# Patient Record
Sex: FEMALE | Race: WHITE | Employment: UNEMPLOYED | URBAN - METROPOLITAN AREA
[De-identification: names, ages, dates, MRNs, and addresses within clinical notes are randomized per-mention and may not be internally consistent; named-entity substitution may affect disease eponyms.]

---

## 2017-01-09 DIAGNOSIS — M10.9 ACUTE GOUTY ARTHRITIS: Primary | ICD-10-CM

## 2017-01-09 NOTE — TELEPHONE ENCOUNTER
allopurinol (ZYLOPRIM) 100 MG tablet       Last Written Prescription Date: 9/12/16  Last Fill Quantity: 30, # refills: 2  Last Office Visit with G, P or [facility] prescribing provider:  12/29/16   Next 5 appointments (look out 90 days)     Jan 30, 2017  1:00 PM   Return Visit with Kd Morris   [facility] ([facility])    [address] [place] [address]   [phone]            Mar 24, 2017  2:30 PM   Return Visit with Joshua Watts MD   [facility] ([facility])    [facility]  [address]  [place] [address]   [phone]                   URIC ACID   Date Value Ref Range Status   11/30/2015 4.5 2.6 - 6.0 mg/dL Final   ]  CREATININE   Date Value Ref Range Status   08/23/2016 0.80 0.52 - 1.04 mg/dL Final   ]  WBC      5.5   12/16/2016  RBC     6.57   12/16/2016  HGB     18.1   12/16/2016  HCT     56.7   12/16/2016  No components found with this name: mct  MCV       86   12/16/2016  MCH     27.5   12/16/2016  MCHC     31.9   12/16/2016  RDW     20.9   12/16/2016  PLT      118   12/16/2016  PLT      175   1/24/2008  PLT      175   1/24/2008  AST       16   6/3/2016  ALT       31   6/3/2016

## 2017-01-11 RX ORDER — ALLOPURINOL 100 MG/1
100 TABLET ORAL DAILY
Qty: 90 TABLET | Refills: 2 | Status: SHIPPED | OUTPATIENT
Start: 2017-01-11 | End: 2017-10-09

## 2017-01-12 ENCOUNTER — RADIANT APPOINTMENT (OUTPATIENT)
Dept: GENERAL RADIOLOGY | Facility: CLINIC | Age: 52
End: 2017-01-12
Attending: ORTHOPAEDIC SURGERY
Payer: COMMERCIAL

## 2017-01-12 ENCOUNTER — OFFICE VISIT (OUTPATIENT)
Dept: ORTHOPEDICS | Facility: CLINIC | Age: 52
End: 2017-01-12
Payer: COMMERCIAL

## 2017-01-12 VITALS — HEIGHT: 58 IN | WEIGHT: 225.8 LBS | RESPIRATION RATE: 16 BRPM | BODY MASS INDEX: 47.4 KG/M2

## 2017-01-12 DIAGNOSIS — M25.811 SHOULDER IMPINGEMENT, RIGHT: ICD-10-CM

## 2017-01-12 DIAGNOSIS — G89.29 CHRONIC RIGHT SHOULDER PAIN: Primary | ICD-10-CM

## 2017-01-12 DIAGNOSIS — M25.511 CHRONIC RIGHT SHOULDER PAIN: Primary | ICD-10-CM

## 2017-01-12 DIAGNOSIS — G89.29 CHRONIC RIGHT SHOULDER PAIN: ICD-10-CM

## 2017-01-12 DIAGNOSIS — M25.511 CHRONIC RIGHT SHOULDER PAIN: ICD-10-CM

## 2017-01-12 DIAGNOSIS — M19.019 AC (ACROMIOCLAVICULAR) JOINT ARTHRITIS: ICD-10-CM

## 2017-01-12 PROCEDURE — 99244 OFF/OP CNSLTJ NEW/EST MOD 40: CPT | Performed by: ORTHOPAEDIC SURGERY

## 2017-01-12 PROCEDURE — 73030 X-RAY EXAM OF SHOULDER: CPT | Mod: RT

## 2017-01-12 ASSESSMENT — PAIN SCALES - GENERAL: PAINLEVEL: WORST PAIN (10)

## 2017-01-12 NOTE — MR AVS SNAPSHOT
After Visit Summary   1/12/2017    Bia Bill    MRN: [id number]           Patient Information     Date Of Birth          1965        Visit Information        Provider Department      1/12/2017 2:00 PM Patel Rice MD [facility] [place]        Today's Diagnoses     Chronic right shoulder pain    -  1       Care Instructions    Please remember to call and schedule a follow up appointment if one was recommended at your earliest convenience.  Orthopedics CLINIC HOURS TELEPHONE NUMBER   Dr. Dwayne Desai  Certified Medical Assistant   Monday & Wednesday   8am - 5pm  Thursday 1pm - 5pm  Friday 8am -11:30am Specialty schedulers:   [phone] to schedule your surgery.  Main Clinic:   [phone] to make an appointment with any provider.    Urgent Care locations:    [facility] Monday-Friday Closed  Saturday-Sunday 9am-5pm      Monday-Friday 12pm - 8pm  Saturday-Sunday 9am-5pm [phone]     If SURGERY has been recommended, please call our Specialty Schedulers at [phone] to schedule your procedure.    If you need a medication refill, please contact your pharmacy. Please allow 3 business days for your refill to be completed.    If an MRI or CT scan has been recommended, please call [place] [facility] Schedulers at [phone] to schedule your appointment.  Use Classkickt (secure e-mail communication and access to your chart) to send a message or to make an appointment. Please ask how you can sign up for [employer].  Your care team's suggested websites for health information:   [email or web address] : Up to date and easily searchable information on multiple topics.   [email or web address] : [place] dept of heat, public health issues in [place], N1N1            Follow-ups after your visit        Your next 10 appointments already scheduled     Jan 12, 2017  2:00 PM   New Visit with MD Ton Borregoview  [facility] [place] ([facility] [place])    [address]  [place] [address] [phone]            Jan 20, 2017  1:40 PM   LAB with [facility] ([facility])    [address] [address]   [phone]           Patient must bring picture ID.  Patient should be prepared to give a urine specimen  Please do not eat 10-12 hours before your appointment if you are coming in fasting for labs on lipids, cholesterol, or glucose (sugar).  Pregnant women should follow their Care Team instructions. Water with medications is okay. Do not drink coffee or other fluids.   If you have concerns about taking  your medications, please ask at office or if scheduling via Arts[employer], send a message by clicking on Secure Messaging, Message Your Care Team.            Jan 30, 2017  1:00 PM   Return Visit with Kd Morris   [facility] ([facility])    [address] [address]   [phone]            Feb 24, 2017  1:40 PM   LAB with [facility])    [address] [address]   [phone]           Patient must bring picture ID.  Patient should be prepared to give a urine specimen  Please do not eat 10-12 hours before your appointment if you are coming in fasting for labs on lipids, cholesterol, or glucose (sugar).  Pregnant women should follow their Care Team instructions. Water with medications is okay. Do not drink coffee or other fluids.   If you have concerns about taking  your medications, please ask at office or if scheduling via [employer], send a message by clicking on Secure Messaging, Message Your Care Team.            Mar 24, 2017  2:00 PM   LAB with [facility] ([facility])    [address] [address]   [phone]           Patient must  "bring picture ID.  Patient should be prepared to give a urine specimen  Please do not eat 10-12 hours before your appointment if you are coming in fasting for labs on lipids, cholesterol, or glucose (sugar).  Pregnant women should follow their Care Team instructions. Water with medications is okay. Do not drink coffee or other fluids.   If you have concerns about taking  your medications, please ask at office or if scheduling via [employer], send a message by clicking on Secure Messaging, Message Your Care Team.            Mar 24, 2017  2:30 PM   Return Visit with Joshua Watts MD   [facility] ([facility])    [facility]  [address]  [place] [address] [phone]              Future tests that were ordered for you today     Open Future Orders        Priority Expected Expires Ordered    XR Shoulder Right G/E 3 Views Routine 1/12/2017 1/12/2018 1/12/2017            Who to contact     If you have questions or need follow up information about today's clinic visit or your schedule please contact [facility] directly at [phone].  Normal or non-critical lab and imaging results will be communicated to you by MyChart, letter or phone within 4 business days after the clinic has received the results. If you do not hear from us within 7 days, please contact the clinic through Imimte[employer] or phone. If you have a critical or abnormal lab result, we will notify you by phone as soon as possible.  Submit refill requests through [employer] or call your pharmacy and they will forward the refill request to us. Please allow 3 business days for your refill to be completed.          Additional Information About Your Visit        [employer] Information     [employer] lets you send messages to your doctor, view your test results, renew your prescriptions, schedule appointments and more. To sign up, go to [email or web address] . Click on \"Log in\" " "on the left side of the screen, which will take you to the Welcome page. Then click on \"Sign up Now\" on the right side of the page.     You will be asked to enter the access code listed below, as well as some personal information. Please follow the directions to create your username and password.     Your access code is: [email or web address]  Expires: 3/29/2017  2:13 PM     Your access code will  in 90 days. If you need help or a new code, please call your [place] clinic or [phone].        Care EveryWhere ID     This is your Care EveryWhere ID. This could be used by other organizations to access your [place] medical records  [id number]        Your Vitals Were     Respirations Height BMI (Body Mass Index) Last Period          16 1.473 m (4' 10\") 47.20 kg/m2 2009         Blood Pressure from Last 3 Encounters:   16 124/62   11/15/16 118/60   16 119/57    Weight from Last 3 Encounters:   17 102.422 kg (225 lb 12.8 oz)   16 100.789 kg (222 lb 3.2 oz)   11/15/16 102.967 kg (227 lb)               Primary Care Provider Office Phone # Fax #    Kd Leong [phone]       [facility] [place] [facility] [address] [address]        Thank you!     Thank you for choosing [facility]  for your care. Our goal is always to provide you with excellent care. Hearing back from our patients is one way we can continue to improve our services. Please take a few minutes to complete the written survey that you may receive in the mail after your visit with us. Thank you!             Your Updated Medication List - Protect others around you: Learn how to safely use, store and throw away your medicines at www.disposemymeds.org.          This list is accurate as of: 17  1:51 PM.  Always use your most recent med list.                   Brand Name Dispense Instructions for use    acetaminophen 500 MG tablet    TYLENOL     Take 500-1,000 mg by mouth " every 6 hours as needed for mild pain       * albuterol (2.5 MG/3ML) 0.083% neb solution     1 Box    Take 1 vial (2.5 mg) by nebulization every 6 hours as needed for shortness of breath / dyspnea or wheezing       * albuterol 108 (90 BASE) MCG/ACT Inhaler    PROAIR HFA/PROVENTIL HFA/VENTOLIN HFA    3 Inhaler    Inhale 2 puffs into the lungs every 6 hours as needed for shortness of breath / dyspnea or wheezing       allopurinol 100 MG tablet    ZYLOPRIM    90 tablet    Take 1 tablet (100 mg) by mouth daily       ARIPiprazole 5 MG tablet    ABILIFY    45 tablet    Take 1.5 tablets (7.5 mg) by mouth At Bedtime       * ASPIRIN NOT PRESCRIBED    INTENTIONAL     by Other route continuous prn.       buPROPion 150 MG 24 hr tablet    WELLBUTRIN XL    90 tablet    Take 1 tablet (150 mg) by mouth every morning       cholecalciferol 1000 UNIT tablet    vitamin D     Take 1 tablet by mouth daily       EPINEPHrine 0.3 MG/0.3ML injection     0.6 mL    Inject 0.3 mLs (0.3 mg) into the muscle once as needed for anaphylaxis       furosemide 20 MG tablet    LASIX    180 tablet    Take 1 tablet (20 mg) by mouth 2 times daily       gabapentin 600 MG tablet    NEURONTIN    135 tablet    Take 1.5 tablets (900 mg) by mouth 3 times daily       ipratropium - albuterol 0.5 mg/2.5 mg/3 mL 0.5-2.5 (3) MG/3ML neb solution    DUONEB    30 vial    Take 1 vial (3 mLs) by nebulization every 6 hours as needed for shortness of breath / dyspnea or wheezing USE THIS INSTEAD OF ALBUTEROL INHALER AS NEEDED FOR WHEEZE COUGH OR SHORTNESS OF BREATH       lamoTRIgine 200 MG tablet    LaMICtal     Take 1 tablet by mouth daily       levothyroxine 150 MCG tablet    SYNTHROID/LEVOTHROID    30 tablet    Take 1 tablet (150 mcg) by mouth daily       mometasone-formoterol 100-5 MCG/ACT oral inhaler    DULERA    13 g    Inhale 2 puffs into the lungs 2 times daily       omeprazole 20 MG CR capsule    priLOSEC    180 capsule    Take 1 capsule (20 mg) by mouth 2 times  "daily       * order for DME     1 Units    Equipment being ordered: CPAP       * order for DME     3 Month    Equipment being ordered: CPAP supplies needed       order for DME     1 Month    Equipment being ordered: INCONTINENCE PADS  QID       * order for DME     60 Month    Equipment being ordered: DEPENDS SIZE LARGE       * order for DME     2 Device    Equipment being ordered:   \"[id number]\" x2pair  \"20-30mmHg [place] Hybrid Liners\" x1pair       * order for DME     2 Units    Equipment being ordered: [id number]\" x2pair   \"20-30mmHg [place] Hybrid Liners\" x 2 pair       prazosin 2 MG capsule    MINIPRESS         simvastatin 20 MG tablet    ZOCOR    90 tablet    Take 1 tablet (20 mg) by mouth At Bedtime (Needs fasting lab work)       traZODone 150 MG tablet    DESYREL     Take 1 tablet by mouth At Bedtime       warfarin 5 MG tablet    COUMADIN    65 tablet    Take 15mg by mouth Mondays and 10mg all other days or as directed by Anticoagulation Clinic       * Notice:  This list has 8 medication(s) that are the same as other medications prescribed for you. Read the directions carefully, and ask your doctor or other care provider to review them with you.      "

## 2017-01-12 NOTE — NURSING NOTE
"Chief Complaint   Patient presents with     Shoulder Pain     Right shoulder pain. Onset: 1/2015-2/2015. Patient states she fell on her left shoulder after she was getting up from a chair. She was dizzy and fell down. She has been seen at [facility] - Dr. Norris iMles. Pain is through the entire shoulder. She has had injections and they didn't help. She has tried PT but that made it worse. Pain with movement. She has a lot of grinding in her shoulder.        Initial Resp 16  Ht 1.473 m (4' 10\")  Wt 102.422 kg (225 lb 12.8 oz)  BMI 47.20 kg/m2  LMP 09/27/2009 Estimated body mass index is 47.2 kg/(m^2) as calculated from the following:    Height as of this encounter: 1.473 m (4' 10\").    Weight as of this encounter: 102.422 kg (225 lb 12.8 oz).  BP completed using cuff size: NA (Not Taken)  Giselle Vega Certified Medical Assistant    "
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
## 2017-01-12 NOTE — PATIENT INSTRUCTIONS
Please remember to call and schedule a follow up appointment if one was recommended at your earliest convenience.  Orthopedics CLINIC HOURS TELEPHONE NUMBER   Dr. Dwayne Desai  Certified Medical Assistant   Monday & Wednesday   8am - 5pm  Thursday 1pm - 5pm  Friday 8am -11:30am Specialty schedulers:   [phone] to schedule your surgery.  Main Clinic:   [phone] to make an appointment with any provider.    Urgent Care locations:    [facility] Monday-Friday Closed  Saturday-Sunday 9am-5pm      Monday-Friday 12pm - 8pm  Saturday-Sunday 9am-5pm [phone]     If SURGERY has been recommended, please call our Specialty Schedulers at [phone] to schedule your procedure.    If you need a medication refill, please contact your pharmacy. Please allow 3 business days for your refill to be completed.    If an MRI or CT scan has been recommended, please call [facility] Schedulers at [phone] to schedule your appointment.  Use [facility] [employer] (secure e-mail communication and access to your chart) to send a message or to make an appointment. Please ask how you can sign up for [facility].  Your care team's suggested websites for health information:   [email or web address] : Up to date and easily searchable information on multiple topics.   [email or web address] : [place] dept of heat, public health issues in [place], N1N1

## 2017-01-13 ENCOUNTER — TELEPHONE (OUTPATIENT)
Dept: PSYCHIATRY | Facility: CLINIC | Age: 52
End: 2017-01-13

## 2017-01-13 ENCOUNTER — TELEPHONE (OUTPATIENT)
Dept: ORTHOPEDICS | Facility: CLINIC | Age: 52
End: 2017-01-13

## 2017-01-13 DIAGNOSIS — F33.1 MAJOR DEPRESSIVE DISORDER, RECURRENT EPISODE, MODERATE (H): Primary | ICD-10-CM

## 2017-01-13 RX ORDER — ARIPIPRAZOLE 5 MG/1
7.5 TABLET ORAL AT BEDTIME
Qty: 45 TABLET | Refills: 0 | Status: SHIPPED | OUTPATIENT
Start: 2017-01-13 | End: 2017-01-18

## 2017-01-13 NOTE — TELEPHONE ENCOUNTER
Refilled Abilify per RN protocol. Called pt to inform her.        Last Written Prescription Date: 11/15/16  Last Fill Quantity: 45, # refills: 1  Last Office Visit with [facility], [facility] or [facility] prescribing provider: 11/15/16   Next 5 appointments (look out 90 days)     Jan 18, 2017 12:15 PM   [facility]    [address] [address]   [phone]            Jan 30, 2017  1:00 PM   Return Visit with Kd Morris   [facility] ([facility]    [address] [address]   [phone]            Feb 01, 2017  2:20 PM   Pre-Op physical with Kd Leong MD   [facility]    [address] [address]   [phone]            Feb 20, 2017 11:00 AM   Return Visit with Patel Rice MD   [facility] [place] ([facility] [place])    [address]  [place] [address]   [phone]            Mar 24, 2017  2:30 PM   Return Visit with Joshua Watts MD   [facility] ([facility])    [facility]  [address]  [place] [address]   [phone]                   BP Readings from Last 3 Encounters:   12/29/16 124/62   11/15/16 118/60   09/22/16 119/57     Pulse Readings from Last 2 Encounters:   12/29/16 76   11/15/16 68     GLC       93   8/23/2016  WBC      5.5   12/16/2016  RBC     6.57   12/16/2016  HGB     18.1   12/16/2016  HCT     56.7   12/16/2016  No components found with this name: mct  MCV       86   12/16/2016  MCH     27.5   12/16/2016  MCHC     31.9   12/16/2016  RDW     20.9   12/16/2016  PLT      118   12/16/2016  PLT      175   1/24/2008  PLT      175   1/24/2008  CHOL      138   10/26/2016  HDL       34   10/26/2016  LDL       78   10/26/2016  TRIG      129    10/26/2016  CHOLHDLRATIO      5.0   9/10/2015  From treatment plan from Lisa Bai RN, MS, APRN 11/18/16  Treatment Plan:  Restart Take one tab for two weeks, then two tabs for two weeks, then four tabs daily. After two weeks of 100 mg, then increase to 150 mg for one week, then increase to 200 mg daily.   Restart aripiprazole (Abilify) 7.5 mg daily.    Continue bupropion (Wellbutrin)  mg daily and trazodone (Desyrel) 75 mg at bedtime.    Follow up with primary care provider in 1-2 months.     - Safety plan was reviewed; to the ER as needed or call after hours crisis line; [phone]  - Education and counseling was done regarding use of medications, psychotherapy options  - Call [phone] for appointment or to speak to a nurse.    -Office hours: Monday through Thursday 8:00 am to 4:30 pm; Friday 8:00 am to Noon.             Signed:  Lisa Bai, RN, MS, CNS-BC

## 2017-01-16 DIAGNOSIS — M10.9 ACUTE GOUTY ARTHRITIS: Primary | ICD-10-CM

## 2017-01-18 ENCOUNTER — OFFICE VISIT (OUTPATIENT)
Dept: PSYCHIATRY | Facility: CLINIC | Age: 52
End: 2017-01-18
Payer: COMMERCIAL

## 2017-01-18 ENCOUNTER — TELEPHONE (OUTPATIENT)
Dept: ANTICOAGULATION | Facility: CLINIC | Age: 52
End: 2017-01-18

## 2017-01-18 VITALS — HEART RATE: 111 BPM | DIASTOLIC BLOOD PRESSURE: 58 MMHG | SYSTOLIC BLOOD PRESSURE: 133 MMHG

## 2017-01-18 DIAGNOSIS — F33.1 MAJOR DEPRESSIVE DISORDER, RECURRENT EPISODE, MODERATE (H): Primary | ICD-10-CM

## 2017-01-18 PROCEDURE — 99214 OFFICE O/P EST MOD 30 MIN: CPT | Performed by: CLINICAL NURSE SPECIALIST

## 2017-01-18 RX ORDER — TRAZODONE HYDROCHLORIDE 150 MG/1
150 TABLET ORAL AT BEDTIME
Qty: 90 TABLET | Refills: 1 | Status: SHIPPED | OUTPATIENT
Start: 2017-01-18 | End: 2017-07-21

## 2017-01-18 RX ORDER — ARIPIPRAZOLE 5 MG/1
7.5 TABLET ORAL AT BEDTIME
Qty: 45 TABLET | Refills: 0 | Status: SHIPPED | OUTPATIENT
Start: 2017-01-18 | End: 2017-02-14

## 2017-01-18 RX ORDER — LAMOTRIGINE 200 MG/1
200 TABLET ORAL DAILY
Qty: 60 TABLET | Refills: 1 | Status: SHIPPED | OUTPATIENT
Start: 2017-01-18 | End: 2017-03-29

## 2017-01-18 ASSESSMENT — ANXIETY QUESTIONNAIRES
7. FEELING AFRAID AS IF SOMETHING AWFUL MIGHT HAPPEN: NOT AT ALL
2. NOT BEING ABLE TO STOP OR CONTROL WORRYING: SEVERAL DAYS
6. BECOMING EASILY ANNOYED OR IRRITABLE: NEARLY EVERY DAY
3. WORRYING TOO MUCH ABOUT DIFFERENT THINGS: SEVERAL DAYS
1. FEELING NERVOUS, ANXIOUS, OR ON EDGE: MORE THAN HALF THE DAYS
5. BEING SO RESTLESS THAT IT IS HARD TO SIT STILL: MORE THAN HALF THE DAYS
GAD7 TOTAL SCORE: 10

## 2017-01-18 ASSESSMENT — PATIENT HEALTH QUESTIONNAIRE - PHQ9: 5. POOR APPETITE OR OVEREATING: SEVERAL DAYS

## 2017-01-18 NOTE — MR AVS SNAPSHOT
After Visit Summary   1/18/2017    Bia Bill    MRN: [id number]           Patient Information     Date Of Birth          1965        Visit Information        Provider Department      1/18/2017 12:15 PM Lisa Bai APRN [facility]        Today's Diagnoses     Major depressive disorder, recurrent episode, moderate (H)    -  1       Care Instructions    Treatment Plan:  Continue lamotrigine (Lamictal) 200 mg daily, aripiprazole (Abilify) 7.5 mg daily, bupropion (Wellbutrin)  mg daily and trazodone (Desyrel) 150 mg at bedtime.   Continue individual therapy.   Follow up with primary care provider in 1-2 months.     - Safety plan was reviewed; to the ER as needed or call after hours crisis line; [phone]  - Education and counseling was done regarding use of medications, psychotherapy options        Follow-ups after your visit        Your next 10 appointments already scheduled     Jan 20, 2017  1:40 PM   LAB with [facility] ([facility])    [address] [address]   [phone]           Patient must bring picture ID.  Patient should be prepared to give a urine specimen  Please do not eat 10-12 hours before your appointment if you are coming in fasting for labs on lipids, cholesterol, or glucose (sugar).  Pregnant women should follow their Care Team instructions. Water with medications is okay. Do not drink coffee or other fluids.   If you have concerns about taking  your medications, please ask at office or if scheduling via [employer], send a message by clicking on Secure Messaging, Message Your Care Team.            Jan 30, 2017  1:00 PM   Return Visit with Kd Morris   [facility] ([facility])    [address] [address]   [phone]            Feb 01, 2017  2:20 PM   Pre-Op physical with Kd Leong MD   [facility]  ([facility])    [address] [address]   [phone]            Feb 20, 2017 11:00 AM   Return Visit with Patel Rice MD   [facility] [place] ([facility] [place])    [address]  [place] [address]   [phone]            Feb 24, 2017  1:40 PM   LAB with [facility])    [address] [address] [phone]           Patient must bring picture ID.  Patient should be prepared to give a urine specimen  Please do not eat 10-12 hours before your appointment if you are coming in fasting for labs on lipids, cholesterol, or glucose (sugar).  Pregnant women should follow their Care Team instructions. Water with medications is okay. Do not drink coffee or other fluids.   If you have concerns about taking  your medications, please ask at office or if scheduling via [email or web address], send a message by clicking on Secure Messaging, Message Your Care Team.            Mar 24, 2017  2:00 PM   LAB with [facility] ([facility])    [address] [id number] [phone]           Patient must bring picture ID.  Patient should be prepared to give a urine specimen  Please do not eat 10-12 hours before your appointment if you are coming in fasting for labs on lipids, cholesterol, or glucose (sugar).  Pregnant women should follow their Care Team instructions. Water with medications is okay. Do not drink coffee or other fluids.   If you have concerns about taking  your medications, please ask at office or if scheduling via [email or web address], send a message by clicking on Secure Messaging, Message Your Care Team.            Mar 24, 2017  2:30 PM   Return Visit with Joshua Watts MD   [facility] ([facility])    [facility]  [address]  [facility] [address]  "  [phone]              Who to contact     If you have questions or need follow up information about today's clinic visit or your schedule please contact [facility] directly at [phone].  Normal or non-critical lab and imaging results will be communicated to you by MyChart, letter or phone within 4 business days after the clinic has received the results. If you do not hear from us within 7 days, please contact the clinic through MyChart or phone. If you have a critical or abnormal lab result, we will notify you by phone as soon as possible.  Submit refill requests through [employer] or call your pharmacy and they will forward the refill request to us. Please allow 3 business days for your refill to be completed.          Additional Information About Your Visit        [facility] Information     [employer] lets you send messages to your doctor, view your test results, renew your prescriptions, schedule appointments and more. To sign up, go to [email or web address] . Click on \"Log in\" on the left side of the screen, which will take you to the Welcome page. Then click on \"Sign up Now\" on the right side of the page.     You will be asked to enter the access code listed below, as well as some personal information. Please follow the directions to create your username and password.     Your access code is: [email or web address]  Expires: 3/29/2017  2:13 PM     Your access code will  in 90 days. If you need help or a new code, please call your [place] clinic or [phone].        Care EveryWhere ID     This is your Care EveryWhere ID. This could be used by other organizations to access your [place] medical records  [id number]        Your Vitals Were     Pulse Last Period Breastfeeding?             111 2009 No          Blood Pressure from Last 3 Encounters:   17 133/58   16 124/62   11/15/16 118/60    Weight from Last 3 Encounters:   17 225 lb 12.8 oz (102.422 kg)   16 222 lb " 3.2 oz (100.789 kg)   11/15/16 227 lb (102.967 kg)              Today, you had the following     No orders found for display         Today's Medication Changes          These changes are accurate as of: 1/18/17 12:42 PM.  If you have any questions, ask your nurse or doctor.               Stop taking these medicines if you haven't already. Please contact your care team if you have questions.     prazosin 2 MG capsule   Commonly known as:  MINIPRESS   Stopped by:  Lisa Bai APRN CNS                Where to get your medicines      These medications were sent to [id number] - [place], [place] - [address]  [address] [address]    Hours:  Closed 10-16-08 business to [facility] Phone:  [phone]    - ARIPiprazole 5 MG tablet  - lamoTRIgine 200 MG tablet  - traZODone 150 MG tablet             Primary Care Provider Office Phone # Fax #    Kd Leong [phone]       [facility] [address] [address]        Thank you!     Thank you for choosing [facility]  for your care. Our goal is always to provide you with excellent care. Hearing back from our patients is one way we can continue to improve our services. Please take a few minutes to complete the written survey that you may receive in the mail after your visit with us. Thank you!             Your Updated Medication List - Protect others around you: Learn how to safely use, store and throw away your medicines at www.disposemymeds.org.          This list is accurate as of: 1/18/17 12:42 PM.  Always use your most recent med list.                   Brand Name Dispense Instructions for use    acetaminophen 500 MG tablet    TYLENOL     Take 500-1,000 mg by mouth every 6 hours as needed for mild pain       * albuterol (2.5 MG/3ML) 0.083% neb solution     1 Box    Take 1 vial (2.5 mg) by nebulization every 6 hours as needed for shortness of breath / dyspnea or  wheezing       * albuterol 108 (90 BASE) MCG/ACT Inhaler    PROAIR HFA/PROVENTIL HFA/VENTOLIN HFA    3 Inhaler    Inhale 2 puffs into the lungs every 6 hours as needed for shortness of breath / dyspnea or wheezing       allopurinol 100 MG tablet    ZYLOPRIM    90 tablet    Take 1 tablet (100 mg) by mouth daily       ARIPiprazole 5 MG tablet    ABILIFY    45 tablet    Take 1.5 tablets (7.5 mg) by mouth At Bedtime       * ASPIRIN NOT PRESCRIBED    INTENTIONAL     by Other route continuous prn.       buPROPion 150 MG 24 hr tablet    WELLBUTRIN XL    90 tablet    Take 1 tablet (150 mg) by mouth every morning       cholecalciferol 1000 UNIT tablet    vitamin D     Take 1 tablet by mouth daily       EPINEPHrine 0.3 MG/0.3ML injection     0.6 mL    Inject 0.3 mLs (0.3 mg) into the muscle once as needed for anaphylaxis       furosemide 20 MG tablet    LASIX    180 tablet    Take 1 tablet (20 mg) by mouth 2 times daily       gabapentin 600 MG tablet    NEURONTIN    135 tablet    Take 1.5 tablets (900 mg) by mouth 3 times daily       ipratropium - albuterol 0.5 mg/2.5 mg/3 mL 0.5-2.5 (3) MG/3ML neb solution    DUONEB    30 vial    Take 1 vial (3 mLs) by nebulization every 6 hours as needed for shortness of breath / dyspnea or wheezing USE THIS INSTEAD OF ALBUTEROL INHALER AS NEEDED FOR WHEEZE COUGH OR SHORTNESS OF BREATH       lamoTRIgine 200 MG tablet    LaMICtal    60 tablet    Take 1 tablet (200 mg) by mouth daily       levothyroxine 150 MCG tablet    SYNTHROID/LEVOTHROID    30 tablet    Take 1 tablet (150 mcg) by mouth daily       mometasone-formoterol 100-5 MCG/ACT oral inhaler    DULERA    13 g    Inhale 2 puffs into the lungs 2 times daily       omeprazole 20 MG CR capsule    priLOSEC    180 capsule    Take 1 capsule (20 mg) by mouth 2 times daily       * order for DME     1 Units    Equipment being ordered: CPAP       * order for DME     3 Month    Equipment being ordered: CPAP supplies needed       order for DME      "1 Month    Equipment being ordered: INCONTINENCE PADS  QID       * order for DME     60 Month    Equipment being ordered: DEPENDS SIZE LARGE       * order for DME     2 Device    Equipment being ordered:   \"[id number]\" x2pair  \"20-30mmHg [place] Hybrid Liners\" x1pair       * order for DME     2 Units    Equipment being ordered: [id number]\" x2pair   \"20-30mmHg [place] Hybrid Liners\" x 2 pair       simvastatin 20 MG tablet    ZOCOR    90 tablet    Take 1 tablet (20 mg) by mouth At Bedtime (Needs fasting lab work)       traZODone 150 MG tablet    DESYREL    90 tablet    Take 1 tablet (150 mg) by mouth At Bedtime       warfarin 5 MG tablet    COUMADIN    65 tablet    Take 15mg by mouth Mondays and 10mg all other days or as directed by Anticoagulation Clinic       * Notice:  This list has 8 medication(s) that are the same as other medications prescribed for you. Read the directions carefully, and ask your doctor or other care provider to review them with you.      "

## 2017-01-18 NOTE — PROGRESS NOTES
"      Psychiatric Progress Note    Name: Bia Bill  Date: 1/18/2017  Length of Visit: 30 minutes  MRN: [id number]      Current Outpatient Prescriptions   Medication Sig     ARIPiprazole (ABILIFY) 5 MG tablet Take 1.5 tablets (7.5 mg) by mouth At Bedtime     allopurinol (ZYLOPRIM) 100 MG tablet Take 1 tablet (100 mg) by mouth daily     VITAMIN D3 1000 UNITS tablet Take 1 tablet by mouth daily     traZODone (DESYREL) 150 MG tablet Take 1 tablet by mouth At Bedtime     lamoTRIgine (LAMICTAL) 200 MG tablet Take 1 tablet by mouth daily     gabapentin (NEURONTIN) 600 MG tablet Take 1.5 tablets (900 mg) by mouth 3 times daily     warfarin (COUMADIN) 5 MG tablet Take 15mg by mouth Mondays and 10mg all other days or as directed by Anticoagulation Clinic     furosemide (LASIX) 20 MG tablet Take 1 tablet (20 mg) by mouth 2 times daily     buPROPion (WELLBUTRIN XL) 150 MG 24 hr tablet Take 1 tablet (150 mg) by mouth every morning     simvastatin (ZOCOR) 20 MG tablet Take 1 tablet (20 mg) by mouth At Bedtime (Needs fasting lab work)     prazosin (MINIPRESS) 2 MG capsule      mometasone-formoterol (DULERA) 100-5 MCG/ACT oral inhaler Inhale 2 puffs into the lungs 2 times daily     order for DME Equipment being ordered: [id number]\" x2pair     \"20-30mmHg [facility] Hybrid Liners\" x 2 pair     EPINEPHrine (EPIPEN) 0.3 MG/0.3ML injection Inject 0.3 mLs (0.3 mg) into the muscle once as needed for anaphylaxis     ipratropium - albuterol 0.5 mg/2.5 mg/3 mL (DUONEB) 0.5-2.5 (3) MG/3ML nebulization Take 1 vial (3 mLs) by nebulization every 6 hours as needed for shortness of breath / dyspnea or wheezing USE THIS INSTEAD OF ALBUTEROL INHALER AS NEEDED FOR WHEEZE COUGH OR SHORTNESS OF BREATH     omeprazole (PRILOSEC) 20 MG capsule Take 1 capsule (20 mg) by mouth 2 times daily     levothyroxine (SYNTHROID, LEVOTHROID) 150 MCG tablet Take 1 tablet (150 mcg) by mouth daily     albuterol (PROAIR HFA, PROVENTIL HFA, VENTOLIN HFA) 108 (90 " "BASE) MCG/ACT inhaler Inhale 2 puffs into the lungs every 6 hours as needed for shortness of breath / dyspnea or wheezing     order for DME Equipment being ordered: DEPENDS SIZE LARGE     order for DME Equipment being ordered:     \"[id number]\" x2pair    \"20-30mmHg [place] Hybrid Liners\" x1pair     order for DME Equipment being ordered: INCONTINENCE PADS   QID     order for DME Equipment being ordered: CPAP supplies needed     albuterol (2.5 MG/3ML) 0.083% nebulizer solution Take 1 vial (2.5 mg) by nebulization every 6 hours as needed for shortness of breath / dyspnea or wheezing     acetaminophen (TYLENOL) 500 MG tablet Take 500-1,000 mg by mouth every 6 hours as needed for mild pain     ORDER FOR DME Equipment being ordered: CPAP     ASPIRIN NOT PRESCRIBED, INTENTIONAL, by Other route continuous prn.     No current facility-administered medications for this visit.       Therapist:  Kd Morris LP    PHQ-9:  PHQ-9 score:  9  PHQ-9 SCORE 12/29/2016   Total Score -   Total Score 11       HCIN-7: 10  CHIN-7 SCORE 9/2/2016 11/15/2016 12/29/2016   Total Score - - -   Total Score 11 13 9           Interim History:  Patient returns for follow up from appointment 11-. Patient had run out of medications prior to the last appointment and restarted lamotrigine (Lamictal) 200 mg daily, aripiprazole (Abilify) 7.5 mg daily, bupropion (Wellbutrin)  mg daily, and trazodone (Desyrel) 75 mg at bedtime. Today, patient reports she is doing well. Patient reports she will be having shoulder surgery and is anxious about that. We discussed this is a normal response to this stressor and some anxiety is expected. Patient has been getting information from family and friends that has increased the anxiety. Patient reports relieving stress by doing handiwork.       Past Medical History   Diagnosis Date     Anxiety state, unspecified      Depressive disorder, not elsewhere classified      Bipolar I disorder, most recent episode " "(or current) unspecified      Polycythemia, secondary      [place] HGB      DVT, lower extremity (H)        10 point ROS is negative except for those listed above.     Vital Signs:   /58 mmHg  Pulse 111  LMP 09/27/2009  Breastfeeding? No      Mental Status Assessment:  Appearance:  Well groomed      Behavior/relationship to examiner/demeanor:  Cooperative, engaged and pleasant  Motor activity:  Normal  Gait:  Normal   Speech:  Normal in volume, articulation, coherence   Mood (subjective report):  \"Really pretty good\"  Affect (objective appearance):  Mood congruent  Thought Process (Associations):  Logical, linear and goal directed  Thought content:  No evidence of suicidal or homicidal ideation,          no overt psychosis and                    patient does not appear to be responding to internal stimuli  Oriented to person, place, date/time   Attention Span and concentration: Intact   Memory:  Short-term memory intact and Long-term memory; Intact  Language:  Fluent   Fund of Knowledge/Intelligence:  Average  Use of language: Intact   Abstraction:  Normal  Insight:  Adequate  Judgment:  Adequate for safety    DSM DIAGNOSIS:  296.80 Unspecified Bipolar and Related Disorder    305.00 Alcohol Use Disorder, in sustained remission    Assessment:  Patient reports her mood has been good. She reports increased anxiety related to upcoming shoulder surgery. We discussed this is a pretty normal in response to this type of stressor. No medications changes or adjustments are needed. Patient plans to continue with individual therapy.     Medication side effects and alternatives were reviewed.     Treatment Plan:  Continue lamotrigine (Lamictal) 200 mg daily, aripiprazole (Abilify) 7.5 mg daily, bupropion (Wellbutrin)  mg daily and trazodone (Desyrel) 150 mg at bedtime.   Continue individual therapy.   Follow up with primary care provider in 1-2 months.     - Safety plan was reviewed; to the ER as needed or call " after hours crisis line; [phone]  - Education and counseling was done regarding use of medications, psychotherapy options  - Call [phone] for appointment or to speak to a nurse.    -Office hours: Monday through Thursday 8:00 am to 4:30 pm; Friday 8:00 am to Noon.             Signed:  Lisa Bai RN, MS, CNS-BC

## 2017-01-18 NOTE — PATIENT INSTRUCTIONS
Treatment Plan:  Continue lamotrigine (Lamictal) 200 mg daily, aripiprazole (Abilify) 7.5 mg daily, bupropion (Wellbutrin)  mg daily and trazodone (Desyrel) 150 mg at bedtime.   Continue individual therapy.   Follow up with primary care provider in 1-2 months.     - Safety plan was reviewed; to the ER as needed or call after hours crisis line; [phone]  - Education and counseling was done regarding use of medications, psychotherapy options

## 2017-01-18 NOTE — TELEPHONE ENCOUNTER
Norah is scheduled to have right shoulder surgery completed on 2-7-17 and will need to hold warfarin for 7 days.   Patient is currently on warfarin for PVD and DVT and stroke history. Has CHADS-VASc score of 5. Patient also has erythrocytosis with elevated hemoglobin and hematocrit.  Current CHEST guidelines suggest considering bridging for those with high thrombotic risk.   While the patient is off warfarin, would you recommend the patient use enoxaparin injections as a bridge? If yes, would you like the prophylactic dose (40mg daily) or the therapeutic dose (1mg/kg twice daily)? Should the patient use enoxaparin both before and after the procedure or only afterwards? Patient has used therapeutic lovenox in the past.  CURRENT BRIDGING GUIDELINES  *NOTE: This does not take into consideration the bleeding risk of the procedure.     Pre-Procedural bridging is not needed for most patient's except for the following:    VTE within the previous month    Prior history of recurrent VTE during anticoagulation therapy interruption    Underingoing a procedure with high inherent risk for VTE (ie. Joint replacement, major abdominal cancer resection)     Perioperative Thrombotic Risk Stratification    High Thrombotic Risk Moderate Thrombotic Risk Low Thrombotic Risk     Mechanical Heart Valve   Any mitral valve prosthesis    Any caged-ball or tilting disk aortic    valve prothesis    Stroke or TIA within 6 months   Bileaflet aortic valve prothesis and one or more of the following risk factors: Afib, prior stroke or TIA, hypertension, diabetes, CHF, age >75 years   Bileaflet aortic valve prothesis without Afib and no other risk factors for stroke     Atrial Fibrillation   CHADS2 score 5 or 6    Stroke or TIA within 3 months    Rheumatic vavlular heart disease     CHADS2 score of 3 or 4   CHADS2 score of 0 to 2 (assuming no prior stroke or TIA)       VTE   VTE within 3 months    Severe thrombophilia (eg. Deficiency of protein C,  protein S, or antithrombin; antiphospholipid antibodies; Homozygous Factor V Leiden or Prothrombin Gene Mutation; muliple abormalities)   VTE within the past 3-12 months    Non-severe thrombophilia (eg. Heterozygous Factor V Leiden or Prothrombin Gene Mutation)    Recurrent VTE    Active cancer (treated within 6 months or palliative)   VTE >12 months and no other risk factors    For additional Anticoagulation Bridging Guidelines -- Click HERE    Please respond to the [facility] pool ([id number]) so all staff are aware of the plan. The Anticoagulation Clinic will order any necessary medications and contact the patient with a written plan regarding the upcoming procedure. Thank you!  Anticoagulation Clinic Staff  Phone: [phone]  Fax: [phone]  Pool # [id number]

## 2017-01-18 NOTE — TELEPHONE ENCOUNTER
Use therapeutic before and after bridging.   Thanks.    CALLY  I could not get this message to route to [place] [id number]

## 2017-01-19 ENCOUNTER — ANTICOAGULATION THERAPY VISIT (OUTPATIENT)
Dept: ANTICOAGULATION | Facility: CLINIC | Age: 52
End: 2017-01-19
Payer: COMMERCIAL

## 2017-01-19 DIAGNOSIS — I82.409 DVT (DEEP VENOUS THROMBOSIS) (H): ICD-10-CM

## 2017-01-19 DIAGNOSIS — Z79.01 LONG TERM CURRENT USE OF ANTICOAGULANT THERAPY: ICD-10-CM

## 2017-01-19 DIAGNOSIS — I73.9 PVD (PERIPHERAL VASCULAR DISEASE) WITH CLAUDICATION (H): Primary | ICD-10-CM

## 2017-01-19 PROCEDURE — 99207 ZZC NO CHARGE NURSE ONLY: CPT

## 2017-01-19 ASSESSMENT — PATIENT HEALTH QUESTIONNAIRE - PHQ9: SUM OF ALL RESPONSES TO PHQ QUESTIONS 1-9: 9

## 2017-01-19 ASSESSMENT — ANXIETY QUESTIONNAIRES: GAD7 TOTAL SCORE: 10

## 2017-01-19 NOTE — TELEPHONE ENCOUNTER
Writer left a message for patient to call [facility]. We need to know the followin. Is her surgery scheduled for admission to the hospital or is it same day surgery?   2. Does she need an appointment in family practice to get a refresher on how to use the Lovenox injections? It appears they were last used in .   3. Where would she like the Lovenox prescription sent to?   4. Can we make an appointment in [facility] to go over these instructions face to face?     Bridging instructions are typed up in [facility] encounter from 17. They may need to be changed after we are able to reach the patient. She is due for an INR check tomorrow in the lab.

## 2017-01-19 NOTE — PROGRESS NOTES
ANTICOAGULATION FOLLOW-UP CLINIC VISIT    Patient Name:  Bia Bill  Date:  1/19/2017  Contact Type:  Bridging instructions for upcoming surgery / chart review / LM for patient to call [facility]    SUBJECTIVE:     Patient Findings     Positives Dental/Other Procedures (Upcoming shoulder surgery -- needs bridging instructions)    Comments Patient has shoulder surgery scheduled for February 7th. Per PCP, patient is to use therapeutic Lovenox before and after surgery. She needs to hold her warfarin for 7 days beforehand.     Writer called and left a voicemail for patient to call [facility]. We need to know a few things to help write out accurate perioperative anticoagulation instructions:  1. Is her surgery scheduled for admission to the hospital or is it same day surgery?  2. Does she need an appointment in family practice to get a refresher on how to use the Lovenox injections? It appears they were last used in 2014.  3. Where would she like the Lovenox prescription sent to?  4. Can we make an appointment in [facility] to go over these instructions face to face?            OBJECTIVE    INR   Date Value Ref Range Status   12/16/2016 4.89* 0.86 - 1.14 Final     Comment:     Special tube used to correct for high hematocrit       ASSESSMENT / PLAN  No question data found.  Anticoagulation Summary as of 1/19/2017     INR goal 2.0-3.0   Selected INR No new INR was available at the time of this encounter.   Maintenance plan 15 mg (5 mg x 3) on Mon; 10 mg (5 mg x 2) all other days   Full instructions 1/31: Hold; 2/1: Hold; 2/2: Hold; 2/3: Hold; 2/4: Hold; 2/5: Hold; 2/6: Hold; 2/7: 17.5 mg; 2/8: 15 mg; 2/9: 15 mg; Otherwise 15 mg on Mon; 10 mg all other days   Weekly total 75 mg   Plan last modified Brenna Rausch RN (12/16/2016)   Next INR check 1/20/2017   Priority INR   Target end date Indefinite    Indications   Long term current use of anticoagulant therapy [Z79.01]  DVT (deep venous thrombosis) (H) [I82.409]  PVD (peripheral  vascular disease) with claudication (H) [I73.9]         Anticoagulation Episode Summary     INR check location     Preferred lab     Send INR reminders to [place] PHONE [facility] POOL    Comments * lab draw due to elevated hematocrit (fingerstick meters don't work). LEFT LE. STENT x 3 LEFT UPPER LEG. Previous arterial clot. Bridging instructions for 2/7/17 surgery in [facility] note from 1/19/17.      Anticoagulation Care Providers     Provider Role Specialty Phone number    Kd Leong MD [facility] [phone]            See the Encounter Report to view Anticoagulation Flowsheet and Dosing Calendar (Go to Encounters tab in chart review, and find the Anticoagulation Therapy Visit)        Angelina Nj RN

## 2017-01-20 ENCOUNTER — ANTICOAGULATION THERAPY VISIT (OUTPATIENT)
Dept: ANTICOAGULATION | Facility: CLINIC | Age: 52
End: 2017-01-20
Payer: COMMERCIAL

## 2017-01-20 ENCOUNTER — HOSPITAL ENCOUNTER (OUTPATIENT)
Dept: LAB | Facility: CLINIC | Age: 52
Discharge: HOME | End: 2017-01-20
Attending: INTERNAL MEDICINE | Admitting: FAMILY MEDICINE
Payer: COMMERCIAL

## 2017-01-20 DIAGNOSIS — D75.1 POLYCYTHEMIA, SECONDARY: ICD-10-CM

## 2017-01-20 DIAGNOSIS — Z79.01 LONG TERM CURRENT USE OF ANTICOAGULANT THERAPY: ICD-10-CM

## 2017-01-20 DIAGNOSIS — D75.1 POLYCYTHEMIA, SECONDARY: Primary | ICD-10-CM

## 2017-01-20 DIAGNOSIS — I73.9 PVD (PERIPHERAL VASCULAR DISEASE) WITH CLAUDICATION (H): Primary | ICD-10-CM

## 2017-01-20 DIAGNOSIS — I73.9 PVD (PERIPHERAL VASCULAR DISEASE) (H): ICD-10-CM

## 2017-01-20 DIAGNOSIS — Z86.718 HISTORY OF DEEP VENOUS THROMBOSIS: ICD-10-CM

## 2017-01-20 LAB
BASOPHILS # BLD AUTO: 0 10E9/L (ref 0–0.2)
BASOPHILS NFR BLD AUTO: 0.5 %
DIFFERENTIAL METHOD BLD: ABNORMAL
EOSINOPHIL # BLD AUTO: 0.1 10E9/L (ref 0–0.7)
EOSINOPHIL NFR BLD AUTO: 1.3 %
ERYTHROCYTE [DISTWIDTH] IN BLOOD BY AUTOMATED COUNT: 21.4 % (ref 10–15)
HCT VFR BLD AUTO: 57.1 % (ref 35–47)
HGB BLD-MCNC: 18.3 G/DL (ref 11.7–15.7)
IMM GRANULOCYTES # BLD: 0 10E9/L (ref 0–0.4)
IMM GRANULOCYTES NFR BLD: 0.3 %
INR PPP: 2.55 (ref 0.86–1.14)
LYMPHOCYTES # BLD AUTO: 1.7 10E9/L (ref 0.8–5.3)
LYMPHOCYTES NFR BLD AUTO: 27.8 %
MCH RBC QN AUTO: 28.1 PG (ref 26.5–33)
MCHC RBC AUTO-ENTMCNC: 32 G/DL (ref 31.5–36.5)
MCV RBC AUTO: 88 FL (ref 78–100)
MONOCYTES # BLD AUTO: 0.5 10E9/L (ref 0–1.3)
MONOCYTES NFR BLD AUTO: 7.4 %
NEUTROPHILS # BLD AUTO: 3.8 10E9/L (ref 1.6–8.3)
NEUTROPHILS NFR BLD AUTO: 62.7 %
PLATELET # BLD AUTO: 124 10E9/L (ref 150–450)
RBC # BLD AUTO: 6.51 10E12/L (ref 3.8–5.2)
WBC # BLD AUTO: 6.1 10E9/L (ref 4–11)

## 2017-01-20 PROCEDURE — 85025 COMPLETE CBC W/AUTO DIFF WBC: CPT | Performed by: FAMILY MEDICINE

## 2017-01-20 PROCEDURE — 36415 COLL VENOUS BLD VENIPUNCTURE: CPT | Performed by: FAMILY MEDICINE

## 2017-01-20 PROCEDURE — 85610 PROTHROMBIN TIME: CPT | Performed by: FAMILY MEDICINE

## 2017-01-20 PROCEDURE — 99207 ZZC NO CHARGE NURSE ONLY: CPT

## 2017-01-20 NOTE — PATIENT INSTRUCTIONS
Please follow these instructions for your upcoming shoulder surgery.    Last warfarin dose: January 30th Tuesday, Jan 31st, NO warfarin  Wednesday, Feb 1st, NO warfarin  Thursday, Feb 2nd, NO warfarin, begin enoxaparin injections into the abdomen every 12 hours (AM and PM)  Friday, Feb 3rd, NO warfarin, enoxaparin injection into the abdomen every 12 hours (AM and PM)  Saturday, Feb 4th, NO warfarin, enoxaparin injection into the abdomen every 12 hours (AM and PM)  Sunday, Feb 5th, NO warfarin, enoxaparin injection into the abdomen every 12 hours (AM and PM)  Monday, Feb 6th, NO warfarin, enoxaparin injection into the abdomen AM only (no enoxaparin 24 hours prior to surgery)  Tuesday, Feb 7th, DAY OF PROCEDURE, NO enoxaparin. Restart warfarin 17.5mg (3.5 tabs) in the evening, unless instructed otherwise by the physician   Wednesday, Feb 8th, Restart enoxaparin injections into the abdomen every 12 hours (AM and PM), unless instructed otherwise by the physician, and 15mg (3 tabs) warfarin in the evening.    Thursday, Feb 9th, Enoxaparin injection into the abdomen every 12 hours (AM and PM) and 15mg (3 tabs) warfarin in the evening.  Friday, Feb 10th, Enoxaparin injection in the AM. Please check your INR in the lab. You will need to be on the Lovenox through the weekend. The INR for today is to help with dosing your warfarin to make sure you can stop the Lovenox on Monday.   If you have any questions please call the Anticoagulation Clinic at [phone].  To schedule your appointment please call [phone].

## 2017-01-20 NOTE — MR AVS SNAPSHOT
Bia Bill   1/20/2017 2:15 PM   Anticoagulation Therapy Visit    Description:  51 year old female   Provider:  [facility]   Department:  [facility]           INR as of 1/20/2017     Selected INR       Anticoagulation Summary as of 1/20/2017     INR goal 2.0-3.0   Selected INR    Full instructions 1/31: Hold; 2/1: Hold; 2/2: Hold; 2/3: Hold; 2/4: Hold; 2/5: Hold; 2/6: Hold; 2/7: 17.5 mg; 2/8: 15 mg; 2/9: 15 mg; Otherwise 15 mg on Mon; 10 mg all other days   Next INR check 2/10/2017    Indications   Long term current use of anticoagulant therapy [Z79.01]  DVT (deep venous thrombosis) (H) [I82.409]  PVD (peripheral vascular disease) with claudication (H) [I73.9]         Description     INR from 1/20/17 pending.      Instructions    Please follow these instructions for your upcoming shoulder surgery.    Last warfarin dose: January 30th Tuesday, Jan 31st, NO warfarin  Wednesday, Feb 1st, NO warfarin  Thursday, Feb 2nd, NO warfarin, begin enoxaparin injections into the abdomen every 12 hours (AM and PM)  Friday, Feb 3rd, NO warfarin, enoxaparin injection into the abdomen every 12 hours (AM and PM)  Saturday, Feb 4th, NO warfarin, enoxaparin injection into the abdomen every 12 hours (AM and PM)  Sunday, Feb 5th, NO warfarin, enoxaparin injection into the abdomen every 12 hours (AM and PM)  Monday, Feb 6th, NO warfarin, enoxaparin injection into the abdomen AM only (no enoxaparin 24 hours prior to surgery)  Tuesday, Feb 7th, DAY OF PROCEDURE, NO enoxaparin. Restart warfarin 17.5mg (3.5 tabs) in the evening, unless instructed otherwise by the physician   Wednesday, Feb 8th, Restart enoxaparin injections into the abdomen every 12 hours (AM and PM), unless instructed otherwise by the physician, and 15mg (3 tabs) warfarin in the evening.    Thursday, Feb 9th, Enoxaparin injection into the abdomen every 12 hours (AM and PM) and 15mg (3 tabs) warfarin in the evening.  Friday, Feb 10th, Enoxaparin injection in the  AM. Please check your INR in the lab. You will need to be on the Lovenox through the weekend. The INR for today is to help with dosing your warfarin to make sure you can stop the Lovenox on Monday.   If you have any questions please call the Anticoagulation Clinic at [phone].  To schedule your appointment please call [phone].       Your next Anticoagulation Clinic appointment(s)     Jan 20, 2017  2:15 PM   [facility] Visit with [facility]   [facility]    [address] [address] [phone]              Contact Numbers     Please call [phone] to cancel and/or reschedule your appointment.  Please call [phone] with any problems or questions regarding your therapy          January 2017 Details    Sun Mon Tue Wed Thu Fri Sat     1               2               3               4               5               6               7                 8               9               10               11               12               13               14                 15               16               17               18               19               20      10 mg   See details      21      10 mg           22      10 mg         23      15 mg         24      10 mg         25      10 mg         26      10 mg         27      10 mg         28      10 mg           29      10 mg         30      15 mg         31      Hold              Date Details   01/20 This INR check               How to take your warfarin dose     To take:  10 mg Take 2 of the 5 mg tablets.    To take:  15 mg Take 3 of the 5 mg tablets.    Hold Do not take your warfarin dose. See the Details table to the right for additional instructions.                February 2017 Details    Sun Mon Tue Wed Thu Fri Sat        1      Hold         2      Hold   See details      3      Hold   See details      4      Hold   See details        5      Hold   See details      6      Hold   See  details      7      17.5 mg   See details      8      15 mg   See details      9      15 mg   See details      10      See details      11                 12               13               14               15               16               17               18                 19               20               21               22               23               24               25                 26               27               28                    Date Details   02/02 Hold dose   Lovenox AM and PM      02/03 Hold dose   Lovenox AM and PM      02/04 Hold dose   Lovenox AM and PM      02/05 Hold dose   Lovenox AM and PM      02/06 Hold dose   Lovenox AM only      02/07 Take 17.5 mg (5 mg tablets x 3.5)   NO LOVENOX TODAY      02/08 Take 15 mg (5 mg tablets x 3)   Lovenox AM and PM      02/09 Take 15 mg (5 mg tablets x 3)   Lovenox AM and PM      02/10 Lovenox AM and PM       Date of next INR:  2/10/2017         How to take your warfarin dose     To take:  10 mg Take 2 of the 5 mg tablets.    To take:  15 mg Take 3 of the 5 mg tablets.    To take:  17.5 mg Take 3.5 of the 5 mg tablets.    Hold Do not take your warfarin dose. See the Details table to the right for additional instructions.

## 2017-01-20 NOTE — PROGRESS NOTES
ANTICOAGULATION FOLLOW-UP CLINIC VISIT    Patient Name:  Bia Bill  Date:  1/20/2017  Contact Type:  Face to Face    SUBJECTIVE:     Patient Findings     Positives No Problem Findings (denies concerns, changes, missed doses)    Comments Pt has used Lovenox injections before and states she is comfortable with using them. Has already picked up Rx from [facility].            OBJECTIVE    INR   Date Value Ref Range Status   01/20/2017 2.55* 0.86 - 1.14 Final     Comment:     Special tube used to correct for high hematocrit       ASSESSMENT / PLAN  INR assessment THER    Recheck INR In: 3 WEEKS    INR Location Clinic lab draw due to elevated Hct     Anticoagulation Summary as of 1/20/2017     INR goal 2.0-3.0   Selected INR 2.55 (1/20/2017)   Maintenance plan 15 mg (5 mg x 3) on Mon; 10 mg (5 mg x 2) all other days   Full instructions 1/31: Hold; 2/1: Hold; 2/2: Hold; 2/3: Hold; 2/4: Hold; 2/5: Hold; 2/6: Hold; 2/7: 17.5 mg; 2/8: 15 mg; 2/9: 15 mg; Otherwise 15 mg on Mon; 10 mg all other days   Weekly total 75 mg   No change documented Brenna Rausch, RN   Plan last modified Brenna Rausch, RN (12/16/2016)   Next INR check 2/10/2017   Priority INR   Target end date Indefinite    Indications   Long term current use of anticoagulant therapy [Z79.01]  DVT (deep venous thrombosis) (H) [I82.409]  PVD (peripheral vascular disease) with claudication (H) [I73.9]         Anticoagulation Episode Summary     INR check location     Preferred lab     Send INR reminders to [facility] POOL    Comments * lab draw due to elevated hematocrit (fingerstick meters don't work). LEFT LE. STENT x 3 LEFT UPPER LEG. Previous arterial clot. Bridging instructions for 2/7/17 surgery in [facility] note from 1/19/17.      Anticoagulation Care Providers     Provider Role Specialty Phone number    Kd Leong MD [facility] [phone]            See the Encounter Report to view Anticoagulation Flowsheet and Dosing Calendar  (Go to Encounters tab in chart review, and find the Anticoagulation Therapy Visit)    Dosage adjustment made based on physician directed care plan.    Brenna Rausch RN

## 2017-01-24 DIAGNOSIS — E03.9 HYPOTHYROIDISM: ICD-10-CM

## 2017-01-24 DIAGNOSIS — E78.00 HYPERCHOLESTEREMIA: Primary | ICD-10-CM

## 2017-01-24 NOTE — TELEPHONE ENCOUNTER
simvastatin (ZOCOR) 20 MG tablet     Last Written Prescription Date: 10/21/16  Last Fill Quantity: 90, # refills: 0  Last Office Visit with G, [facility] or [facility] prescribing provider: 12/29/16   Next 5 appointments (look out 90 days)     Jan 30, 2017  1:00 PM   Return Visit with Kd Morris   [facility] ([facility])    [address] [place] [address]   [phone]            Feb 01, 2017  2:20 PM   Pre-Op physical with Kd Leong MD   [facility]    [address] [address]   [phone]            Feb 20, 2017 11:00 AM   Return Visit with Patel Rice MD   [facility] [place] ([facility] [place])    [address]  [place] [address]   [phone]            Mar 24, 2017  2:30 PM   Return Visit with Joshua Watts MD   [facility] ([facility])    [address] [facility]  [address]  [place] [address]   [phone]                   CHOL      138   10/26/2016  HDL       34   10/26/2016  LDL       78   10/26/2016  TRIG      129   10/26/2016  CHOLHDLRATIO      5.0   9/10/2015

## 2017-01-26 RX ORDER — SIMVASTATIN 20 MG
20 TABLET ORAL AT BEDTIME
Qty: 30 TABLET | Refills: 0 | Status: SHIPPED | OUTPATIENT
Start: 2017-01-26 | End: 2017-02-27

## 2017-01-26 NOTE — TELEPHONE ENCOUNTER
Medication is being filled for 1 time refill only due to:  Patient needs labs lipids, thyroid. Future labs ordered yes.   Mundo Conde RN

## 2017-01-30 ENCOUNTER — OFFICE VISIT (OUTPATIENT)
Dept: PSYCHOLOGY | Facility: CLINIC | Age: 52
End: 2017-01-30
Payer: COMMERCIAL

## 2017-01-30 DIAGNOSIS — F31.62 MODERATE MIXED BIPOLAR I DISORDER (H): Primary | ICD-10-CM

## 2017-01-30 PROCEDURE — 90834 PSYTX W PT 45 MINUTES: CPT | Performed by: PSYCHOLOGIST

## 2017-01-31 NOTE — PROGRESS NOTES
Progress Note  Disclaimer: This note consists of symbols derived from keyboarding, dictation and/or voice recognition software. As a result, there may be errors in the script that have gone undetected. Please consider this when interpreting information found in this chart.    Client Name: Bia Bill  Date: 1/30/2017         Service Type: Individual      Session Start Time: 1:00 PM  Session End Time: 1:45 PM      Session Length: 45     Session #: 7     Attendees: Client attended alone    Treatment Plan Last Reviewed: 10/26/2016  PHQ-9 / CHIN-7 : 12     DATA   Client reported she will be having shoulder surgery in the first week in February. She is quite anxious about this . She is worried that the surgery might not go wellor that she will be experiencing significant pain afterwards.We spent some time examining the pros and cons of her surgery and reviewing what her care team has recommended so far.Client agrees that the surgery is most likely necessary though she remains skeptical that it will help. She is preparing for her recovery with friends, PCAs, and her roommate.She is distracting herself from her anxiety by doing crafting and sewing projects.     Progress Since Last Session (Related to Symptoms / Goals / Homework):   Symptoms: Stable    Homework: Achieved / completed to satisfaction      Episode of Care Goals: Satisfactory progress - ACTION (Actively working towards change); Intervened by reinforcing change plan / affirming steps taken     Current / Ongoing Stressors and Concerns:   Recurrent depression  family relational problems,anxiety over upcoming shoulder surgery     Treatment Objective(s) Addressed in This Session:   Increase interest, engagement, and pleasure in doing things  Decrease frequency and intensity of feeling down, depressed, hopeless       Intervention:   CBT: Behavioral activation increasefeelings of self efficacy, support for gains made.  Use of distraction techniques and positive visualizations for her upcoming surgery        ASSESSMENT: Current Emotional / Mental Status (status of significant symptoms):   Risk status (Self / Other harm or suicidal ideation)   Client denies current fears or concerns for personal safety.   Client denies current or recent suicidal ideation or behaviors.   Client denies current or recent homicidal ideation or behaviors.   Client denies current or recent self injurious behavior or ideation.   Client denies other safety concerns.   A safety and risk management plan has not been developed at this time, however client was given the after-hours number / 911 should there be a change in any of these risk factors.     Appearance:   Appropriate    Eye Contact:   Good    Psychomotor Behavior: Normal    Attitude:   Cooperative    Orientation:   All   Speech    Rate / Production: Normal     Volume:  Normal    Mood:    Normal   Affect:    Appropriate    Thought Content:  Clear    Thought Form:  Coherent  Logical    Insight:    Good      Medication Review:   No changes to current psychiatric medication(s)     Medication Compliance:   Yes     Changes in Health Issues:   None reported     Chemical Use Review:   Substance Use: Chemical use reviewed, no active concerns identified      Tobacco Use: No current tobacco use.       Collateral Reports Completed:   Not Applicable    PLAN: (Client Tasks / Therapist Tasks / Other)  Client to continue sticking to her boundaries with children and other family members. Client to continue to get out of the house at least 3 times per week, remember her self-care plan. Client to practice positive visualization for her upcoming surgery.        Kd Morris                                                         ________________________________________________________________________    Treatment Plan    Client's Name: Bia Bill  YOB: 1965    Date: 10/26/2016      DSM5  Diagnoses: (Sustained by DSM5 Criteria Listed Above)  Diagnoses: 296.40 Bipolar I Disorder Current or Most Recent Episode Manic, Unspecified  Psychosocial & Contextual Factors: financial difficulties, chronic pain, roommate issues  WHODAS 2.0 (12 item)               This questionnaire asks about difficulties due to health conditions. Health conditions             include disease or illnesses, other health problems that may be short or long lasting,             injuries, mental health or emotional problems, and problems with alcohol or drugs.                         Think back over the past 30 days and answer these questions, thinking about how much             difficulty you had doing the following activities. For each question, please [place] only             one response.      S1  Standing for long periods such as 30 minutes?  Mild =           2    S2  Taking care of household responsibilities?  Moderate =   3    S3  Learning a new task, for example, learning how to get to a new place?  Mild =           2    S4  How much of a problem do you have joining community activities (for example, festivals, [employer]ab[employer]ism or other activities) in the same way as anyone else can?  Moderate =   3    S5  How much have you been emotionally affected by your health problems?  Mild =           2        In the past 30 days, how much difficulty did you have in:    S6  Concentrating on doing something for ten minutes?  Mild =           2    S7  Walking a long distance such as a kilometer (or equivalent)?  Severe =       4    S8  Washing your whole body?  None =         1    S9  Getting dressed?  None =         1    S10  Dealing with people you do not know?  Moderate =   3    S11  Maintaining a friendship?  Severe =       4    S12  Your day to day work?  Moderate =   3       H1  Overall, in the past 30 days, how many days were these difficulties present?  Record number of days seven    H2  In the past 30 days, for how many days were you  totally unable to carry out your usual activities or work because of any health condition?  Record number of days  seven    H3  In the past 30 days, not counting the days that you were totally unable, for how many days did you cut back or reduce your usual activities or work because of any health condition?  Record number of days five                   Referral / Collaboration:  Referral to another professional/service is not indicated at this time..    Anticipated number of session or this episode of care: 15    Goals  1. Education- the Biopsychosocial model of depression  a. Client will be able to describe how depression is effecting them physically, emotionally and socially  2. Behavioral Activation  a. Client will learn to assess their depression on a day to day basis  b. Client will Identify two forms of exercise/activity and engage in them 3 times per week  c. Client will Identify 3 things that make them laugh, and engage in these 5 times per week.  d. Client will Identify 1-2Creative activities or hobbies  and engage in them 2 times per week  e. Client will identify music, movies, books that make them feel good and use them 3-4 times per week  3. Self-care  a. Client will identify 5 things they can do just for themselves  b. Client will take time for quiet, reflection, meditation 5 times per week  c. Client will Learn to set boundaries when appropriate  d. Client will Identify 2 individuals they can call on for support, distraction  4. Assessment of progress  a. Client will engage in assessment of their progress on a regular basis    Bipolar Disorder  Treatment plan:  1. Education- the Biopsychosocial model of Bipolar Disorder    a. Client will be able to describe in general terms what Bipolar Disorder  is and is not  b. Client will be able to describe how Bipolar Disorder  has affected their life in at least two different areas, such as school or work and Home/relationships  c. Clients parents/guardians or  significant others will be provided information on what Bipolar Disorder  is and the ways it can affect relationships and be encouraged to be a part of clients treatment team.  2. Medication  a. Client will participate in medication evaluation for Bipolar Disorder  symptoms and follow medication recommendations.   3. Identification and management of triggers  a. Client and therapist will examine patient s history to determine if there are predictable triggers for manic or depressive episodes (e.g. boredom, anger, family stress)  b. Client and therapist will develop means of diffusing these triggers (e.g. relaxation strategies, boundary setting, anger management)  4. Comorbid conditions   a. Client and therapist will asses for comorbid conditions (e.g. anxiety, depression, substance use). and add additional items to treatment plan as needed  5. Self-care  a. Client will identify 3 things they can do just for themselves  b. Client will take time for quiet, reflection, meditation 3 times per week  c. Client will Learn to set boundaries when appropriate  d. Client will Identify 2 individuals they can call on for support, distraction  5. Behavioral Activation  a. Client will Identify two forms of exercise/activity and engage in them 3 times per week  b. Client will Identify 2 things that make them laugh, and engage in these 3 times per week.  c. Client will Identify 2 Creative activities or hobbies  and engage in them 2 times per week  d. Client will identify music, movies, books that make them feel good and use them 3 times per week  6. Assessment of progress  a. Client will engage in assessment of their progress on a regular basis    Client has reviewed and agreed to the above plan.      Kd Morris  October 28, 2016

## 2017-02-01 ENCOUNTER — OFFICE VISIT (OUTPATIENT)
Dept: FAMILY MEDICINE | Facility: CLINIC | Age: 52
End: 2017-02-01
Payer: COMMERCIAL

## 2017-02-01 ENCOUNTER — RADIANT APPOINTMENT (OUTPATIENT)
Dept: GENERAL RADIOLOGY | Facility: CLINIC | Age: 52
End: 2017-02-01
Attending: FAMILY MEDICINE
Payer: COMMERCIAL

## 2017-02-01 VITALS
BODY MASS INDEX: 47.75 KG/M2 | WEIGHT: 227.5 LBS | DIASTOLIC BLOOD PRESSURE: 62 MMHG | HEIGHT: 58 IN | TEMPERATURE: 97.5 F | HEART RATE: 68 BPM | SYSTOLIC BLOOD PRESSURE: 110 MMHG

## 2017-02-01 DIAGNOSIS — J44.9 CHRONIC OBSTRUCTIVE PULMONARY DISEASE, UNSPECIFIED COPD TYPE (H): ICD-10-CM

## 2017-02-01 DIAGNOSIS — F34.1 PERSONALITY DISORDER, DEPRESSIVE: ICD-10-CM

## 2017-02-01 DIAGNOSIS — K76.0 FATTY LIVER: ICD-10-CM

## 2017-02-01 DIAGNOSIS — Z79.01 LONG TERM CURRENT USE OF ANTICOAGULANT THERAPY: ICD-10-CM

## 2017-02-01 DIAGNOSIS — F17.200 TOBACCO USE DISORDER: ICD-10-CM

## 2017-02-01 DIAGNOSIS — G47.33 OSA (OBSTRUCTIVE SLEEP APNEA): ICD-10-CM

## 2017-02-01 DIAGNOSIS — F31.62 MODERATE MIXED BIPOLAR I DISORDER (H): ICD-10-CM

## 2017-02-01 DIAGNOSIS — E66.2 MORBID OBESITY WITH ALVEOLAR HYPOVENTILATION (H): ICD-10-CM

## 2017-02-01 DIAGNOSIS — I82.409 DEEP VEIN THROMBOSIS (DVT) OF LOWER EXTREMITY, UNSPECIFIED CHRONICITY, UNSPECIFIED LATERALITY, UNSPECIFIED VEIN (H): ICD-10-CM

## 2017-02-01 DIAGNOSIS — G63 POLYNEUROPATHY IN OTHER DISEASES CLASSIFIED ELSEWHERE (H): ICD-10-CM

## 2017-02-01 DIAGNOSIS — Z01.818 PREOP GENERAL PHYSICAL EXAM: Primary | ICD-10-CM

## 2017-02-01 LAB
ALBUMIN SERPL-MCNC: 3.4 G/DL (ref 3.4–5)
ALP SERPL-CCNC: 112 U/L (ref 40–150)
ALT SERPL W P-5'-P-CCNC: 27 U/L (ref 0–50)
ANION GAP SERPL CALCULATED.3IONS-SCNC: 6 MMOL/L (ref 3–14)
AST SERPL W P-5'-P-CCNC: 19 U/L (ref 0–45)
BASOPHILS # BLD AUTO: 0 10E9/L (ref 0–0.2)
BASOPHILS NFR BLD AUTO: 0.3 %
BILIRUB SERPL-MCNC: 0.5 MG/DL (ref 0.2–1.3)
BUN SERPL-MCNC: 9 MG/DL (ref 7–30)
CALCIUM SERPL-MCNC: 8.9 MG/DL (ref 8.5–10.1)
CHLORIDE SERPL-SCNC: 102 MMOL/L (ref 94–109)
CO2 SERPL-SCNC: 28 MMOL/L (ref 20–32)
CREAT SERPL-MCNC: 0.79 MG/DL (ref 0.52–1.04)
DIFFERENTIAL METHOD BLD: ABNORMAL
EOSINOPHIL # BLD AUTO: 0.1 10E9/L (ref 0–0.7)
EOSINOPHIL NFR BLD AUTO: 1 %
ERYTHROCYTE [DISTWIDTH] IN BLOOD BY AUTOMATED COUNT: 21.5 % (ref 10–15)
GFR SERPL CREATININE-BSD FRML MDRD: 77 ML/MIN/1.7M2
GLUCOSE SERPL-MCNC: 116 MG/DL (ref 70–99)
HCT VFR BLD AUTO: 59.5 % (ref 35–47)
HGB BLD-MCNC: 18.9 G/DL (ref 11.7–15.7)
LYMPHOCYTES # BLD AUTO: 1.9 10E9/L (ref 0.8–5.3)
LYMPHOCYTES NFR BLD AUTO: 30 %
MCH RBC QN AUTO: 28 PG (ref 26.5–33)
MCHC RBC AUTO-ENTMCNC: 31.8 G/DL (ref 31.5–36.5)
MCV RBC AUTO: 88 FL (ref 78–100)
MONOCYTES # BLD AUTO: 0.4 10E9/L (ref 0–1.3)
MONOCYTES NFR BLD AUTO: 7.1 %
NEUTROPHILS # BLD AUTO: 3.8 10E9/L (ref 1.6–8.3)
NEUTROPHILS NFR BLD AUTO: 61.6 %
PLATELET # BLD AUTO: 111 10E9/L (ref 150–450)
POTASSIUM SERPL-SCNC: 3.7 MMOL/L (ref 3.4–5.3)
PROT SERPL-MCNC: 6.7 G/DL (ref 6.8–8.8)
RBC # BLD AUTO: 6.75 10E12/L (ref 3.8–5.2)
SODIUM SERPL-SCNC: 136 MMOL/L (ref 133–144)
WBC # BLD AUTO: 6.2 10E9/L (ref 4–11)

## 2017-02-01 PROCEDURE — 93000 ELECTROCARDIOGRAM COMPLETE: CPT | Performed by: FAMILY MEDICINE

## 2017-02-01 PROCEDURE — 99215 OFFICE O/P EST HI 40 MIN: CPT | Performed by: FAMILY MEDICINE

## 2017-02-01 PROCEDURE — 85025 COMPLETE CBC W/AUTO DIFF WBC: CPT | Performed by: FAMILY MEDICINE

## 2017-02-01 PROCEDURE — 80053 COMPREHEN METABOLIC PANEL: CPT | Performed by: FAMILY MEDICINE

## 2017-02-01 PROCEDURE — 71020 XR CHEST 2 VW: CPT

## 2017-02-01 PROCEDURE — 36415 COLL VENOUS BLD VENIPUNCTURE: CPT | Performed by: FAMILY MEDICINE

## 2017-02-01 NOTE — PATIENT INSTRUCTIONS
Call Dr Dejesus's office  You need to be cleared by him prior to surgery.  If you can't get in you will june to rescheudle your surgery.    If you are having 2nd thoughts about your surgery  Do not have it.      Before Your Surgery      Call your surgeon if there is any change in your health. This includes signs of a cold or flu (such as a sore throat, runny nose, cough, rash or fever).    Do not smoke, drink alcohol or take over the counter medicine (unless your surgeon or primary care doctor tells you to) for the 24 hours before and after surgery.    If you take prescribed drugs: Follow your doctor s orders about which medicines to take and which to stop until after surgery.    Eating and drinking prior to surgery: follow the instructions from your surgeon    Take a shower or bath the night before surgery. Use the soap your surgeon gave you to gently clean your skin. If you do not have soap from your surgeon, use your regular soap. Do not shave or scrub the surgery site.  Wear clean pajamas and have clean sheets on your bed.

## 2017-02-01 NOTE — Clinical Note
My Asthma Action Plan  Name: Bia Bill   YOB: 1965  Date: 2/1/2017   My doctor: Kd Leong   [facility]      My Control Medicine: Mometasone+formoterol (Dulera) -  100/5 mcg        Dose: 100-2 MCG 2 puffs bid  My Rescue Medicine: Albuterol (Proair/Ventolin/Proventil) HFA        Dose: 108 MCG prn   My Asthma Severity: mild persistent  Avoid your asthma triggers: upper respiratory infections, pollens, humidity, strong odors and fumes, exercise or sports and cold air        GREEN ZONE   Good Control    I feel good    No cough or wheeze    Can work, sleep and play without asthma symptoms       Take your asthma control medicine every day.     1. If exercise triggers your asthma, take your rescue medication    15 minutes before exercise or sports, and    During exercise if you have asthma symptoms  2. Spacer to use with inhaler: If you have a spacer, make sure to use it with your inhaler             YELLOW ZONE Getting Worse  I have ANY of these:    I do not feel good    Cough or wheeze    Chest feels tight    Wake up at night   1. Keep taking your Green Zone medications  2. Start taking your rescue medicine:    every 20 minutes for up to 1 hour. Then every 4 hours for 24-48 hours.  3. If you stay in the Yellow Zone for more than 12-24 hours, contact your doctor.  4. If you do not return to the Green Zone in 12-24 hours or you get worse, start taking your oral steroid medicine if prescribed by your provider.           RED ZONE Medical Alert - Get Help  I have ANY of these:    I feel awful    Medicine is not helping    Breathing getting harder    Trouble walking or talking    Nose opens wide to breathe       1. Take your rescue medicine NOW  2. If your provider has prescribed an oral steroid medicine, start taking it NOW  3. Call your doctor NOW  4. If you are still in the Red Zone after 20 minutes and you have not reached your doctor:    Take your rescue medicine again  and    Call 911 or go to the emergency room right away    See your regular doctor within 2 weeks of an Emergency Room or Urgent Care visit for follow-up treatment.        The above medication may be given at school or day care?: N/A (Adult Patient)  Child can carry and use inhaler(s) at school with approval of school nurse?: N/A (Adult Patient)    Electronically signed by: Jennifer Harris, February 1, 2017    Annual Reminders:  Meet with Asthma Educator,  Flu Shot in the Fall, consider Pneumonia Vaccination for patients with asthma (aged 19 and older).    Pharmacy:    [place], [place] - [address] [id number] [address]                    Asthma Triggers  How To Control Things That Make Your Asthma Worse    Triggers are things that make your asthma worse.  Look at the list below to help you find your triggers and what you can do about them.  You can help prevent asthma flare-ups by staying away from your triggers.      Trigger                                                          What you can do   Cigarette Smoke  Tobacco smoke can make asthma worse. Do not allow smoking in your home, car or around you.  Be sure no one smokes at a child s day care or school.  If you smoke, ask your health care provider for ways to help you quit.  Ask family members to quit too.  Ask your health care provider for a referral to Quit Plan to help you quit smoking, or call 8-947-112-PLAN.     Colds, Flu, Bronchitis  These are common triggers of asthma. Wash your hands often.  Don t touch your eyes, nose or mouth.  Get a flu shot every year.     Dust Mites  These are tiny bugs that live in cloth or carpet. They are too small to see. Wash sheets and blankets in hot water every week.   Encase pillows and mattress in dust mite proof covers.  Avoid having carpet if you can. If you have carpet, vacuum weekly.   Use a dust mask and HEPA vacuum.   Pollen and Outdoor  Mold  Some people are allergic to trees, grass, or weed pollen, or molds. Try to keep your windows closed.  Limit time out doors when pollen count is high.   Ask you health care provider about taking medicine during allergy season.     Animal Dander  Some people are allergic to skin flakes, urine or saliva from pets with fur or feathers. Keep pets with fur or feathers out of your home.    If you can t keep the pet outdoors, then keep the pet out of your bedroom.  Keep the bedroom door closed.  Keep pets off cloth furniture and away from stuffed toys.     Mice, Rats, and Cockroaches  Some people are allergic to the waste from these pests.   Cover food and garbage.  Clean up spills and food crumbs.  Store grease in the refrigerator.   Keep food out of the bedroom.   Indoor Mold  This can be a trigger if your home has high moisture. Fix leaking faucets, pipes, or other sources of water.   Clean moldy surfaces.  Dehumidify basement if it is damp and smelly.   Smoke, Strong Odors, and Sprays  These can reduce air quality. Stay away from strong odors and sprays, such as perfume, powder, hair spray, paints, smoke incense, paint, cleaning products, candles and new carpet.   Exercise or Sports  Some people with asthma have this trigger. Be active!  Ask your doctor about taking medicine before sports or exercise to prevent symptoms.    Warm up for 5-10 minutes before and after sports or exercise.     Other Triggers of Asthma  Cold air:  Cover your nose and mouth with a scarf.  Sometimes laughing or crying can be a trigger.  Some medicines and food can trigger asthma.

## 2017-02-01 NOTE — NURSING NOTE
"Chief Complaint   Patient presents with     Pre-Op Exam       Initial /62 mmHg  Pulse 68  Temp(Src) 97.5  F (36.4  C) (Tympanic)  Ht 4' 10\" (1.473 m)  Wt 227 lb 8 oz (103.193 kg)  BMI 47.56 kg/m2  LMP 09/27/2009 Estimated body mass index is 47.56 kg/(m^2) as calculated from the following:    Height as of this encounter: 4' 10\" (1.473 m).    Weight as of this encounter: 227 lb 8 oz (103.193 kg).  BP completed using cuff size: large in right arm.  Jennifer Harris CMA    "
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
## 2017-02-01 NOTE — MR AVS SNAPSHOT
After Visit Summary   2/1/2017    Bia Bill    MRN: [id number]           Patient Information     Date Of Birth          1965        Visit Information        Provider Department      2/1/2017 2:20 PM Kd Leong MD [facility]        Today's Diagnoses     Preop general physical exam    -  1     Morbid obesity with alveolar hypoventilation (H)         Polyneuropathy in other diseases classified elsewhere (H)         Long term current use of anticoagulant therapy         Chronic obstructive pulmonary disease, unspecified COPD type (H)         Moderate mixed bipolar I disorder (H)         Personality disorder, depressive         GILES (Obstructive Sleep Apnea)-Moderate (AHI 16)         Fatty liver         Deep vein thrombosis (DVT) of lower extremity, unspecified chronicity, unspecified laterality, unspecified vein (H)         Tobacco use disorder           Care Instructions        Call Dr Dejesus's office  You need to be cleared by him prior to surgery.  If you can't get in you will june to rescheudle your surgery.    If you are having 2nd thoughts about your surgery  Do not have it.      Before Your Surgery      Call your surgeon if there is any change in your health. This includes signs of a cold or flu (such as a sore throat, runny nose, cough, rash or fever).    Do not smoke, drink alcohol or take over the counter medicine (unless your surgeon or primary care doctor tells you to) for the 24 hours before and after surgery.    If you take prescribed drugs: Follow your doctor s orders about which medicines to take and which to stop until after surgery.    Eating and drinking prior to surgery: follow the instructions from your surgeon    Take a shower or bath the night before surgery. Use the soap your surgeon gave you to gently clean your skin. If you do not have soap from your surgeon, use your regular soap. Do not shave or scrub the surgery site.  Wear clean pajamas and have  clean sheets on your bed.         Follow-ups after your visit        Your next 10 appointments already scheduled     Feb 06, 2017  3:00 PM   New Sleep Patient with Patel Bazzi MD   [facility] ([facility])    [address]  [facility] [address]   [phone]            Feb 20, 2017 11:00 AM   Return Visit with Patel Rice MD   [facility] [place] ([facility] [place])    [address]  [place] [address]   [phone]            Feb 24, 2017  1:40 PM   LAB with [facility] ([facility])    [address] [address]   [phone]           Patient must bring picture ID.  Patient should be prepared to give a urine specimen  Please do not eat 10-12 hours before your appointment if you are coming in fasting for labs on lipids, cholesterol, or glucose (sugar).  Pregnant women should follow their Care Team instructions. Water with medications is okay. Do not drink coffee or other fluids.   If you have concerns about taking  your medications, please ask at office or if scheduling via [employer], send a message by clicking on Secure Messaging, Message Your Care Team.            Mar 01, 2017  9:30 AM   [facility]    [address] [facility] [address] [address]   [phone]            Mar 15, 2017  9:30 AM   [facility]    [address] [address]   [phone]            Mar 24, 2017  2:00 PM   LAB with [facility] ([facility])    [address] [address]   [phone]           Patient must bring picture ID.  Patient should be prepared to give a urine specimen  Please do not eat 10-12 hours before your appointment if  "you are coming in fasting for labs on lipids, cholesterol, or glucose (sugar).  Pregnant women should follow their Care Team instructions. Water with medications is okay. Do not drink coffee or other fluids.   If you have concerns about taking  your medications, please ask at office or if scheduling via [employer], send a message by clicking on Secure Messaging, Message Your Care Team.            Mar 24, 2017  2:30 PM   Return Visit with Joshua Watts MD   [facility] ([facility])    [facility]  [address]  [facility] [address] [phone]              Who to contact     Normal or non-critical lab and imaging results will be communicated to you by [employer], letter or phone within 4 business days after the clinic has received the results. If you do not hear from us within 7 days, please contact the clinic through [employer] or phone. If you have a critical or abnormal lab result, we will notify you by phone as soon as possible.  Submit refill requests through [employer] or call your pharmacy and they will forward the refill request to us. Please allow 3 business days for your refill to be completed.          If you need to speak with a  for additional information , please call: [phone]             Additional Information About Your Visit        [employer] Information     [employer] lets you send messages to your doctor, view your test results, renew your prescriptions, schedule appointments and more. To sign up, go to [email or web address] . Click on \"Log in\" on the left side of the screen, which will take you to the Welcome page. Then click on \"Sign up Now\" on the right side of the page.     You will be asked to enter the access code listed below, as well as some personal information. Please follow the directions to create your username and password.     Your access code is: [email or web address]  Expires: 3/29/2017  2:13 PM     Your access code will " " in 90 days. If you need help or a new code, please call your [facility] clinic or [phone].        Care EveryWhere ID     This is your Care EveryWhere ID. This could be used by other organizations to access your [facility] medical records  [id number]        Your Vitals Were     Pulse Temperature Height BMI (Body Mass Index) Last Period       68 97.5  F (36.4  C) (Tympanic) 4' 10\" (1.473 m) 47.56 kg/m2 2009        Blood Pressure from Last 3 Encounters:   17 110/62   17 133/58   16 124/62    Weight from Last 3 Encounters:   17 227 lb 8 oz (103.193 kg)   17 225 lb 12.8 oz (102.422 kg)   16 222 lb 3.2 oz (100.789 kg)              We Performed the Following     CBC with platelets differential     Comprehensive metabolic panel (BMP + Alb, Alk Phos, ALT, AST, Total. Bili, TP)     EKG 12-lead complete w/read - Clinics        Primary Care Provider Office Phone # Fax #    Kd Leong [phone]       [facility] [address] [address] [address]        Thank you!     Thank you for choosing [facility]  for your care. Our goal is always to provide you with excellent care. Hearing back from our patients is one way we can continue to improve our services. Please take a few minutes to complete the written survey that you may receive in the mail after your visit with us. Thank you!             Your Updated Medication List - Protect others around you: Learn how to safely use, store and throw away your medicines at www.disposemymeds.org.          This list is accurate as of: 17  4:02 PM.  Always use your most recent med list.                   Brand Name Dispense Instructions for use    acetaminophen 500 MG tablet    TYLENOL     Take 500-1,000 mg by mouth every 6 hours as needed for mild pain       * albuterol (2.5 MG/3ML) 0.083% neb solution     1 Box    Take 1 vial (2.5 mg) by nebulization every 6 hours as needed for shortness of " breath / dyspnea or wheezing       * albuterol 108 (90 BASE) MCG/ACT Inhaler    PROAIR HFA/PROVENTIL HFA/VENTOLIN HFA    3 Inhaler    Inhale 2 puffs into the lungs every 6 hours as needed for shortness of breath / dyspnea or wheezing       allopurinol 100 MG tablet    ZYLOPRIM    90 tablet    Take 1 tablet (100 mg) by mouth daily       ARIPiprazole 5 MG tablet    ABILIFY    45 tablet    Take 1.5 tablets (7.5 mg) by mouth At Bedtime       * ASPIRIN NOT PRESCRIBED    INTENTIONAL     by Other route continuous prn.       buPROPion 150 MG 24 hr tablet    WELLBUTRIN XL    90 tablet    Take 1 tablet (150 mg) by mouth every morning       cholecalciferol 1000 UNIT tablet    vitamin D     Take 1 tablet by mouth daily       enoxaparin 100 MG/ML injection    LOVENOX    20 Syringe    Inject 1 mL (100 mg) Subcutaneous every 12 hours Please follow the instructions provided by [facility]       EPINEPHrine 0.3 MG/0.3ML injection     0.6 mL    Inject 0.3 mLs (0.3 mg) into the muscle once as needed for anaphylaxis       furosemide 20 MG tablet    LASIX    180 tablet    Take 1 tablet (20 mg) by mouth 2 times daily       gabapentin 600 MG tablet    NEURONTIN    135 tablet    Take 1.5 tablets (900 mg) by mouth 3 times daily       ipratropium - albuterol 0.5 mg/2.5 mg/3 mL 0.5-2.5 (3) MG/3ML neb solution    DUONEB    30 vial    Take 1 vial (3 mLs) by nebulization every 6 hours as needed for shortness of breath / dyspnea or wheezing USE THIS INSTEAD OF ALBUTEROL INHALER AS NEEDED FOR WHEEZE COUGH OR SHORTNESS OF BREATH       lamoTRIgine 200 MG tablet    LaMICtal    60 tablet    Take 1 tablet (200 mg) by mouth daily       levothyroxine 150 MCG tablet    SYNTHROID/LEVOTHROID    30 tablet    Take 1 tablet (150 mcg) by mouth daily       mometasone-formoterol 100-5 MCG/ACT oral inhaler    DULERA    13 g    Inhale 2 puffs into the lungs 2 times daily       omeprazole 20 MG CR capsule    priLOSEC    180 capsule    Take 1 capsule (20 mg) by  "mouth 2 times daily       * order for DME     1 Units    Equipment being ordered: CPAP       * order for DME     3 Month    Equipment being ordered: CPAP supplies needed       order for DME     1 Month    Equipment being ordered: INCONTINENCE PADS  QID       * order for DME     60 Month    Equipment being ordered: DEPENDS SIZE LARGE       * order for DME     2 Device    Equipment being ordered:   \"[id number]\" x2pair  \"20-30mmHg F[employer] Hybrid Liners\" x1pair       * order for DME     2 Units    Equipment being ordered: [id number]\" x2pair   \"20-30mmHg [employer] Hybrid Liners\" x 2 pair       simvastatin 20 MG tablet    ZOCOR    30 tablet    Take 1 tablet (20 mg) by mouth At Bedtime       traZODone 150 MG tablet    DESYREL    90 tablet    Take 1 tablet (150 mg) by mouth At Bedtime       warfarin 5 MG tablet    COUMADIN    65 tablet    Take 15mg by mouth Mondays and 10mg all other days or as directed by Anticoagulation Clinic       * Notice:  This list has 8 medication(s) that are the same as other medications prescribed for you. Read the directions carefully, and ask your doctor or other care provider to review them with you.      "

## 2017-02-01 NOTE — Clinical Note
My Depression Action Plan  Name: Bia Bill   Date of Birth 1965  Date: 2/1/2017    My doctor: Kd Leong   My clinic: [address] [address] [phone]          GREEN    ZONE   Good Control    What it looks like:     Things are going generally well. You have normal up s and down s. You may even feel depressed from time to time, but bad moods usually last less than a day.   What you need to do:  1. Continue to care for yourself (see self care plan)  2. Check your depression survival kit and update it as needed  3. Follow your physician s recommendations including any medication.  4. Do not stop taking medication unless you consult with your physician first.           YELLOW         ZONE Getting Worse    What it looks like:     Depression is starting to interfere with your life.     It may be hard to get out of bed; you may be starting to isolate yourself from others.    Symptoms of depression are starting to last most all day and this has happened for several days.     You may have suicidal thoughts but they are not constant.   What you need to do:     1. Call your care team, your response to treatment will improve if you keep your care team informed of your progress. Yellow periods are signs an adjustment may need to be made.     2. Continue your self-care, even if you have to fake it!    3. Talk to someone in your support network    4. Open up your depression survival kit           RED    ZONE Medical Alert - Get Help    What it looks like:     Depression is seriously interfering with your life.     You may experience these or other symptoms: You can t get out of bed most days, can t work or engage in other necessary activities, you have trouble taking care of basic hygiene, or basic responsibilities, thoughts of suicide or death that will not go away, self-injurious behavior.     What you need to do:  1. Call your care team and request  a same-day appointment. If they are not available (weekends or after hours) call your local crisis line, emergency room or 911.      Electronically signed by: Jennifer Harris, February 1, 2017    Depression Self Care Plan / Survival Kit    Self-Care for Depression  Here s the deal. Your body and mind are really not as separate as most people think.  What you do and think affects how you feel and how you feel influences what you do and think. This means if you do things that people who feel good do, it will help you feel better.  Sometimes this is all it takes.  There is also a place for medication and therapy depending on how severe your depression is, so be sure to consult with your medical provider and/ or Behavioral Health Consultant if your symptoms are worsening or not improving.     In order to better manage my stress, I will:    Exercise  Get some form of exercise, every day. This will help reduce pain and release endorphins, the  feel good  chemicals in your brain. This is almost as good as taking antidepressants!  This is not the same as joining a gym and then never going! (they count on that by the way ) It can be as simple as just going for a walk or doing some gardening, anything that will get you moving.      Hygiene   Maintain good hygiene (Get out of bed in the morning, Make your bed, Brush your teeth, Take a shower, and Get dressed like you were going to work, even if you are unemployed).  If your clothes don't fit try to get ones that do.    Diet  I will strive to eat foods that are good for me, drink plenty of water, and avoid excessive sugar, caffeine, alcohol, and other mood-altering substances.  Some foods that are helpful in depression are: complex carbohydrates, B vitamins, flaxseed, fish or fish oil, fresh fruits and vegetables.    Psychotherapy  I agree to participate in Individual Therapy (if recommended).    Medication  If prescribed medications, I agree to take them.  Missing doses can result  in serious side effects.  I understand that drinking alcohol, or other illicit drug use, may cause potential side effects.  I will not stop my medication abruptly without first discussing it with my provider.    Staying Connected With Others  I will stay in touch with my friends, family members, and my primary care provider/team.    Use your imagination  Be creative.  We all have a creative side; it doesn t matter if it s oil painting, sand castles, or mud pies! This will also kick up the endorphins.    Witness Beauty  (AKA stop and smell the roses) Take a look outside, even in mid-winter. Notice colors, textures. Watch the squirrels and birds.     Service to others  Be of service to others.  There is always someone else in need.  By helping others we can  get out of ourselves  and remember the really important things.  This also provides opportunities for practicing all the other parts of the program.    Humor  Laugh and be silly!  Adjust your TV habits for less news and crime-drama and more comedy.    Control your stress  Try breathing deep, massage therapy, biofeedback, and meditation. Find time to relax each day.     My support system    Clinic Contact:  Phone number:    Contact 1:  Phone number:    Contact 2:  Phone number:    Alevism/:  Phone number:    Therapist:  Phone number:    Local crisis center:    Phone number:    Other community support:  Phone number:

## 2017-02-02 ENCOUNTER — TELEPHONE (OUTPATIENT)
Dept: ONCOLOGY | Facility: CLINIC | Age: 52
End: 2017-02-02

## 2017-02-02 ASSESSMENT — ASTHMA QUESTIONNAIRES: ACT_TOTALSCORE: 22

## 2017-02-02 NOTE — TELEPHONE ENCOUNTER
Pt called and scheduled to see Dr. Watts to obtain clearance and for bloodwork prior to her upcoming surgery on Tuesday 2/7/17. Pt verbalized understanding and is scheduled for an appt at 3:30pm.

## 2017-02-03 ENCOUNTER — ONCOLOGY VISIT (OUTPATIENT)
Dept: ONCOLOGY | Facility: CLINIC | Age: 52
End: 2017-02-03
Attending: INTERNAL MEDICINE
Payer: COMMERCIAL

## 2017-02-03 VITALS
WEIGHT: 224.6 LBS | DIASTOLIC BLOOD PRESSURE: 63 MMHG | BODY MASS INDEX: 45.28 KG/M2 | SYSTOLIC BLOOD PRESSURE: 135 MMHG | HEART RATE: 75 BPM | OXYGEN SATURATION: 98 % | RESPIRATION RATE: 18 BRPM | HEIGHT: 59 IN | TEMPERATURE: 98.3 F

## 2017-02-03 DIAGNOSIS — D75.1 ERYTHROCYTOSIS: ICD-10-CM

## 2017-02-03 DIAGNOSIS — D75.1 POLYCYTHEMIA, SECONDARY: Primary | ICD-10-CM

## 2017-02-03 PROCEDURE — 99213 OFFICE O/P EST LOW 20 MIN: CPT | Performed by: INTERNAL MEDICINE

## 2017-02-03 PROCEDURE — 99211 OFF/OP EST MAY X REQ PHY/QHP: CPT

## 2017-02-03 ASSESSMENT — PAIN SCALES - GENERAL: PAINLEVEL: EXTREME PAIN (9)

## 2017-02-03 NOTE — PATIENT INSTRUCTIONS
We would like to see you back as scheduled.    When you are in need of a refill, please call your pharmacy and they will send us a request.  Copy of appointments, and after visit summary (AVS) given to patient.    If you have any questions during business hours (M-F 8 AM- 4PM), please call Lenny Patel, RN, BSN, OCN or Deepthi Condon RN, BSN, OCN Oncology Hematology Care Coordinators at [facility] [phone].   For questions after business hours, or on holidays/weekends, please call our after hours Nurse Triage line [phone]. Thank you.

## 2017-02-03 NOTE — MR AVS SNAPSHOT
After Visit Summary   2/3/2017    Bia Bill    MRN: [id number]           Patient Information     Date Of Birth          1965        Visit Information        Provider Department      2/3/2017 3:30 PM Joshua Watts MD [facility]        Today's Diagnoses     SECONDARY POLYCYTHEMIA    -  1     Erythrocytosis            Follow-ups after your visit        Your next 10 appointments already scheduled     Feb 06, 2017  3:00 PM   [facility]    [address]  [facility] [address]   [phone]            Feb 20, 2017 11:00 AM   Return Visit with Patel Rice MD   [facility] [place] ([facility] [place]    [address]  [place] [address]   [phone]            Feb 24, 2017  1:40 PM   LAB with [facility] ([facility])    [address] [facility] [address]   [phone]           Patient must bring picture ID.  Patient should be prepared to give a urine specimen  Please do not eat 10-12 hours before your appointment if you are coming in fasting for labs on lipids, cholesterol, or glucose (sugar).  Pregnant women should follow their Care Team instructions. Water with medications is okay. Do not drink coffee or other fluids.   If you have concerns about taking  your medications, please ask at office or if scheduling via Rezolvehart, send a message by clicking on Secure Messaging, Message Your Care Team.            Mar 01, 2017  9:30 AM   Return Visit with Kd Morris   [facility] ([facility])    [address] [address]   [phone]            Mar 15, 2017  9:30 AM   Return Visit with Kd Morris   [facility] ([facility])    [address] [address]   [phone]     "        Mar 24, 2017  2:00 PM   LAB with [facility] ([facility])    [address]  [facility] [address] [phone]           Patient must bring picture ID.  Patient should be prepared to give a urine specimen  Please do not eat 10-12 hours before your appointment if you are coming in fasting for labs on lipids, cholesterol, or glucose (sugar).  Pregnant women should follow their Care Team instructions. Water with medications is okay. Do not drink coffee or other fluids.   If you have concerns about taking  your medications, please ask at office or if scheduling via Bulu Box, send a message by clicking on Secure Messaging, Message Your Care Team.            Mar 24, 2017  2:30 PM   Return Visit with Joshua Watts MD   [facility] ([facility])    [facility]  [address]  [facility] [address] [phone]              Who to contact     If you have questions or need follow up information about today's clinic visit or your schedule please contact [facility] [place] [facility] directly at [phone].  Normal or non-critical lab and imaging results will be communicated to you by MyChart, letter or phone within 4 business days after the clinic has received the results. If you do not hear from us within 7 days, please contact the clinic through FoodEssentialshart or phone. If you have a critical or abnormal lab result, we will notify you by phone as soon as possible.  Submit refill requests through Bulu Box or call your pharmacy and they will forward the refill request to us. Please allow 3 business days for your refill to be completed.          Additional Information About Your Visit        FoodEssentialsharDelpor Information     Bulu Box lets you send messages to your doctor, view your test results, renew your prescriptions, schedule appointments and more. To sign up, go to [email or web address] . Click on \"Log in\" on the left side of " "the screen, which will take you to the Welcome page. Then click on \"Sign up Now\" on the right side of the page.     You will be asked to enter the access code listed below, as well as some personal information. Please follow the directions to create your username and password.     Your access code is: [email or web address]  Expires: 3/29/2017  2:13 PM     Your access code will  in 90 days. If you need help or a new code, please call your [facility] or [phone].        Care EveryWhere ID     This is your Care EveryWhere ID. This could be used by other organizations to access your [facility] medical records  [id number]        Your Vitals Were     Pulse Temperature Respirations    75 98.3  F (36.8  C) (Tympanic) 18    Height BMI (Body Mass Index) Pulse Oximetry    1.499 m (4' 11\") 45.34 kg/m2 98%    Last Period Breastfeeding?       2009 No        Blood Pressure from Last 3 Encounters:   17 135/63   17 110/62   17 133/58    Weight from Last 3 Encounters:   17 101.878 kg (224 lb 9.6 oz)   17 103.193 kg (227 lb 8 oz)   17 102.422 kg (225 lb 12.8 oz)              Today, you had the following     No orders found for display       Primary Care Provider Office Phone # Fax #    Kd Leong [phone]       [facility] [address] [facility] [address]        Thank you!     Thank you for choosing [facility] [place] operated by [facility]  for your care. Our goal is always to provide you with excellent care. Hearing back from our patients is one way we can continue to improve our services. Please take a few minutes to complete the written survey that you may receive in the mail after your visit with us. Thank you!             Your Updated Medication List - Protect others around you: Learn how to safely use, store and throw away your medicines at www.disposemymeds.org.          This list is accurate as of: 2/3/17  3:56 PM.  Always use your most recent med list.          "          Brand Name Dispense Instructions for use    acetaminophen 500 MG tablet    TYLENOL     Take 500-1,000 mg by mouth every 6 hours as needed for mild pain       * albuterol (2.5 MG/3ML) 0.083% neb solution     1 Box    Take 1 vial (2.5 mg) by nebulization every 6 hours as needed for shortness of breath / dyspnea or wheezing       * albuterol 108 (90 BASE) MCG/ACT Inhaler    PROAIR HFA/PROVENTIL HFA/VENTOLIN HFA    3 Inhaler    Inhale 2 puffs into the lungs every 6 hours as needed for shortness of breath / dyspnea or wheezing       allopurinol 100 MG tablet    ZYLOPRIM    90 tablet    Take 1 tablet (100 mg) by mouth daily       ARIPiprazole 5 MG tablet    ABILIFY    45 tablet    Take 1.5 tablets (7.5 mg) by mouth At Bedtime       * ASPIRIN NOT PRESCRIBED    INTENTIONAL     by Other route continuous prn.       buPROPion 150 MG 24 hr tablet    WELLBUTRIN XL    90 tablet    Take 1 tablet (150 mg) by mouth every morning       cholecalciferol 1000 UNIT tablet    vitamin D     Take 1 tablet by mouth daily       enoxaparin 100 MG/ML injection    LOVENOX    20 Syringe    Inject 1 mL (100 mg) Subcutaneous every 12 hours Please follow the instructions provided by [facility]       EPINEPHrine 0.3 MG/0.3ML injection     0.6 mL    Inject 0.3 mLs (0.3 mg) into the muscle once as needed for anaphylaxis       furosemide 20 MG tablet    LASIX    180 tablet    Take 1 tablet (20 mg) by mouth 2 times daily       gabapentin 600 MG tablet    NEURONTIN    135 tablet    Take 1.5 tablets (900 mg) by mouth 3 times daily       ipratropium - albuterol 0.5 mg/2.5 mg/3 mL 0.5-2.5 (3) MG/3ML neb solution    DUONEB    30 vial    Take 1 vial (3 mLs) by nebulization every 6 hours as needed for shortness of breath / dyspnea or wheezing USE THIS INSTEAD OF ALBUTEROL INHALER AS NEEDED FOR WHEEZE COUGH OR SHORTNESS OF BREATH       lamoTRIgine 200 MG tablet    LaMICtal    60 tablet    Take 1 tablet (200 mg) by mouth daily       levothyroxine  "150 MCG tablet    SYNTHROID/LEVOTHROID    30 tablet    Take 1 tablet (150 mcg) by mouth daily       mometasone-formoterol 100-5 MCG/ACT oral inhaler    DULERA    13 g    Inhale 2 puffs into the lungs 2 times daily       omeprazole 20 MG CR capsule    priLOSEC    180 capsule    Take 1 capsule (20 mg) by mouth 2 times daily       * order for DME     1 Units    Equipment being ordered: CPAP       * order for DME     3 Month    Equipment being ordered: CPAP supplies needed       order for DME     1 Month    Equipment being ordered: INCONTINENCE PADS  QID       * order for DME     60 Month    Equipment being ordered: DEPENDS SIZE LARGE       * order for DME     2 Device    Equipment being ordered:   \"[id number]\" x2pair  \"20-30mmHg Farrow Hybrid Liners\" x1pair       * order for DME     2 Units    Equipment being ordered: [id number]\" x2pair   \"20-30mmHg Farrow Hybrid Liners\" x 2 pair       simvastatin 20 MG tablet    ZOCOR    30 tablet    Take 1 tablet (20 mg) by mouth At Bedtime       traZODone 150 MG tablet    DESYREL    90 tablet    Take 1 tablet (150 mg) by mouth At Bedtime       warfarin 5 MG tablet    COUMADIN    65 tablet    Take 15mg by mouth Mondays and 10mg all other days or as directed by Anticoagulation Clinic       * Notice:  This list has 8 medication(s) that are the same as other medications prescribed for you. Read the directions carefully, and ask your doctor or other care provider to review them with you.      "

## 2017-02-03 NOTE — PROGRESS NOTES
DATE OF VISIT: Feb 3, 2017    Bia Bill is a 51 year old female is seen today for   Chief Complaint   Patient presents with     Hematology     Follow up anemia. Patient is scheduled for surgery on 2/7/2017 need clearance.    .       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 Patient is known with history of deep venous thrombosis in the past. She is currently on anticoagulation with Coumadin. She is here today to discuss perioperative management. I agree that the patient is a high-risk for thromboembolic disease. However, I agreed to proceed to his bridging therapy with full dose anticoagulation with low molecular weight heparin. i explained to her the rationale off bridging therapy. I also reviewed with her the risk of thromboembolic disease.  The patient will stop the Coumadin. She will proceed with low molecular weight heparin  Lovenox 100 mg twice daily. Lovenox will be interrupted before the surgery for 24 hours. And resumed right after the surgery. Coumadin will be restarted the patient will continue Lovenox until INR is more than 2.     The patient is ready to learn, no apparent learning barriers were identified.  Diagnosis and treatment plans were explained to the patient. The patient expressed understanding of the content. The patient asked appropriate questions. The patient questions were answered to her satisfaction.  Time spent 15 minutes with 50% of the time in counseling and coordination of care.  Chart documentation with Dragon Voice recognition Software. Although reviewed after completion, some words and grammatical errors may remain.

## 2017-02-03 NOTE — NURSING NOTE
"Bia Bill is a 51 year old female who presents for:  Chief Complaint   Patient presents with     Hematology     Follow up anemia. Patient is scheduled for surgery on 2/7/2017 need clearance.         Initial Vitals:  /63 mmHg  Pulse 75  Temp(Src) 98.3  F (36.8  C) (Tympanic)  Resp 18  Ht 1.499 m (4' 11\")  Wt 101.878 kg (224 lb 9.6 oz)  BMI 45.34 kg/m2  SpO2 98%  LMP 09/27/2009  Breastfeeding? No Estimated body mass index is 45.34 kg/(m^2) as calculated from the following:    Height as of this encounter: 1.499 m (4' 11\").    Weight as of this encounter: 101.878 kg (224 lb 9.6 oz).. Body surface area is 2.06 meters squared. BP completed using cuff size: large  Extreme Pain (9) Patient's last menstrual period was 09/27/2009. Allergies and medications reviewed.     Medications: Medication refills not needed today.  Pharmacy name entered into [employer]:    [place], [place] - [address] [id number] - [address]    Comments: Follow up anemia. Patient is scheduled for surgery on 2/7/2017 need clearance.     PCP:  Kd Leong MD  Patient reports these concerns today:  Nausea / Vomiting - no  Constipation - no  Diarrhea  - no  Pain - 9/10 right shoulder  Fever / Chills - no  Cough -  Yes chronic. Patient relates to smoking.   Rash - no  Fatigue - no  Other - Patient is scheduled for surgery on her right shoulder 2/7/2017 by Dr. Rice.            10 minutes for nursing intake (face to face time)   Janice Gonzalez CMA        "

## 2017-02-03 NOTE — ASSESSMENT & PLAN NOTE
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 She was previously seen by Dr. Lomax on 10/09/2011, but has been lost from followup. The patient has had erythrocytosis with a hemoglobin around 20-23 and hemoglobin and hematocrit of 60-65% for most of her life. She had a previous workup by Dr. Lomax and also by another hematologist at [facility]. Review of her previous tests and workup showed a hemoglobin electrophoresis on 01/17/2007 that showed a hemoglobin A1 of 52%, A2 at 0, hemoglobin F at 1.4% and other hemoglobin 46.2%. JAK2 was negative on 07/31/2007. Hemoglobin affinity testing on 07/31/2007 showed a P50 of 27 (normal between 25 and 29). Repeat hemoglobin electrophoresis on 10/04/2011 showed the hemoglobin was at 39.9%. This illuminated hemoglobin was suggestive of beta globin variant of hemoglobin Hb [place]. She was also seen by Dr. Peres in 2007 and most of the workup is from there. Erythropoietin level was in the range of the 80s. Vitamin B12 and folic acid were within normal limits. Abdomen ultrasound did not show hepatosplenomegaly, but there is fatty infiltration of the liver. According to the chart, the patient had phlebotomies done in the past to keep her hemoglobin below 20 g/dl. This was done over the summertime of 2011. Most recently she didn't have any phlebotomy. She was started on anticoagulation with Coumadin as she was felt to be at high risk for blood clots and given history of severe arterial disease and stroke in the past. The patient is safe to use her anticoagulation and her most recent INR was 1.74. Evidently her carbon monoxide is also elevated. She is an active smoker, but she is cutting down from 2 packs per day to  3 cigarettes a day over the last one month. Patient denies any DVT or PE. She also evidently claims that her mother and her first son are also having the same hemoglobinopathy and are being followed by their physicians. Bone marrow biopsy on 11/17/2014 showed no clonal chromosomal abnormality and no cytogenetic evidence of a malignant process. She also tested negative for the JAK2 mutation. Leukemia lymphoma evaluation additionally showed no aberrant immunophenotype on T cells and no increase in or abnormal appearing myeloid blasts.

## 2017-02-06 ENCOUNTER — OFFICE VISIT (OUTPATIENT)
Dept: SLEEP MEDICINE | Facility: CLINIC | Age: 52
End: 2017-02-06
Payer: COMMERCIAL

## 2017-02-06 ENCOUNTER — DOCUMENTATION ONLY (OUTPATIENT)
Dept: SLEEP MEDICINE | Facility: CLINIC | Age: 52
End: 2017-02-06
Payer: COMMERCIAL

## 2017-02-06 ENCOUNTER — TELEPHONE (OUTPATIENT)
Dept: FAMILY MEDICINE | Facility: CLINIC | Age: 52
End: 2017-02-06

## 2017-02-06 VITALS
SYSTOLIC BLOOD PRESSURE: 131 MMHG | WEIGHT: 225.6 LBS | DIASTOLIC BLOOD PRESSURE: 70 MMHG | HEART RATE: 74 BPM | HEIGHT: 59 IN | BODY MASS INDEX: 45.48 KG/M2 | OXYGEN SATURATION: 96 %

## 2017-02-06 DIAGNOSIS — G47.33 OSA (OBSTRUCTIVE SLEEP APNEA): Primary | ICD-10-CM

## 2017-02-06 PROCEDURE — 99205 OFFICE O/P NEW HI 60 MIN: CPT | Performed by: FAMILY MEDICINE

## 2017-02-06 NOTE — NURSING NOTE
"Chief Complaint   Patient presents with     Sleep Problem     consult; was referred by Dr. Leong. Sleep study in 2010. concerned that the pressures from machine may not be accurate; humidifier not working.        Initial /70 mmHg  Pulse 74  Ht 1.499 m (4' 11\")  Wt 102.331 kg (225 lb 9.6 oz)  BMI 45.54 kg/m2  SpO2 96%  LMP 09/27/2009 Estimated body mass index is 45.54 kg/(m^2) as calculated from the following:    Height as of this encounter: 1.499 m (4' 11\").    Weight as of this encounter: 102.331 kg (225 lb 9.6 oz).  Medication Reconciliation: complete   "
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
## 2017-02-06 NOTE — PATIENT INSTRUCTIONS

## 2017-02-06 NOTE — PROGRESS NOTES
"CC: \"I need approval from you for my surgery tomorrow\", \"The pressure isn't high enough\"     HPI:   Bia Bill is a 50 yo female with a history of HTN, COPD, mixed bipolar I, personality disorder, GILES, RLS, and polycythemia presenting to verify that her sleep apnea is under adequate enough control for her surgery tomorrow. She will be having a right shoulder arthroscopy, subacromial decompression, distal clavicle resection, possible biceps tenodesis versus tenotomy, possible labral debridement, possible rotator cuff debridement versus repair while under general anesthesia. She was last seen in sleep clinic in 2010. Overall, she feels that she's been doing well, the only concerns about her sleep apnea she has is that her heating plate isn't working and that she feels the pressure could be increased. Regarding the pressure, she feels there isn't high enough pressure towards the middle and end of night. According to patient questionnaire, she reports some RLS symptoms, night terrors, and cataplexy sx. Upon further questioning, the cataplexy seems to be more fatigue related and the night terrors she describes seem more like nightmares and are infrequent. She has a diagnosis of RLS, however, she describes tingling limited to her left upper leg. She reports she had a blood clot in that leg that caused significant swelling resulting in nerve damage which she believes is the reason for these feelings. She is taking neurontin for this.     CPAP reading-   1/7/17- 2/5/17  100% compliance  AHI 1.0  Auto adjust 5-18 cm H2O  Ramp 4 cm H2O   Average use 8 hr 43 min    Family Hx: sleep apnea in both parents and 8 siblings     Social Hx: lives alone, no bed-partners. She does not work, she is on disability. She does not exercise. She reports money being a source of stress. She smokes 1/2 ppd. She denies alcohol and illicit drug use. She drinks about 6 cans of diet pepsi a day.     Past Medical History   Diagnosis Date     " Anxiety state, unspecified      Depressive disorder, not elsewhere classified      Bipolar I disorder, most recent episode (or current) unspecified      Polycythemia, secondary      [place] HGB      DVT, lower extremity (H)      Past Surgical History   Procedure Laterality Date     Lithotripsy  2004     Lithotrypsy     Biliary stent single use cov       3 stints in left leg     D & c  10/26/09     with uterine ablation     Hysterectomy, pap no longer indicated  1-4-2010     Esophagoscopy, gastroscopy, duodenoscopy (egd), combined  4/21/2014     Procedure: Gastroscopy;  Surgeon: Moris Thomas MD;  Location: [facility]     Bone marrow biopsy, bone specimen, needle/trocar N/A 11/17/2014     Procedure: BIOPSY BONE MARROW;  Surgeon: Hay Aaron MD;  Location: [facility]     Patient Active Problem List   Diagnosis     Mild persistent asthma     Polyneuropathy in other diseases classified elsewhere (H)     DVT (deep venous thrombosis) (H)     Alcohol abuse, in remission     SECONDARY POLYCYTHEMIA     Chondromalacia of patella     Sensorineural hearing loss, asymmetrical     Developmental reading disorder     Esophageal reflux     Hypothyroidism     Fatty liver     GILES (Obstructive Sleep Apnea)-Moderate (AHI 16)     RLS (restless legs syndrome)     CARDIOVASCULAR SCREENING; LDL GOAL LESS THAN 100     Smoker     Erythrocytosis     Moderate mixed bipolar I disorder (H)     Personality disorder, depressive     COPD (chronic obstructive pulmonary disease) (H)     Current use of long term anticoagulation     Rosacea     Health Care Home     DDD (degenerative disc disease), cervical     Benign neoplasm of colon (POLYPOSIS)     Stroke (H)     Somatization disorder     Acute gouty arthritis     Sebaceous cyst of right axilla     HTN, goal below 140/90     Left leg pain     Right shoulder pain     Headache     Vitamin D deficiency     Long term current use of anticoagulant therapy     Morbid obesity (H)     PVD  "(peripheral vascular disease) with claudication (H)     Trochanteric bursitis of both hips     Cyst of left ovary     Acute bronchospasm     Morbid obesity with alveolar hypoventilation (H)        Allergies   Allergen Reactions     Percocet [Codeine] Anaphylaxis     Asa [Aspirin] Hives     Aspirin causes seizures and hives, throat swelling       Bee      Darvocet [Acetaminophen] Anaphylaxis     Darvocet,Percocet      Lyrica [Pregabalin] Swelling     dizziness     Percocet [Oxycodone-Acetaminophen] Swelling     Iodine Rash     Reaction to topical betadine     Tape [Adhesive Tape] Rash     Physical Exam:   /70 mmHg  Pulse 74  Ht 1.499 m (4' 11\")  Wt 102.331 kg (225 lb 9.6 oz)  BMI 45.54 kg/m2  SpO2 96%  LMP 09/27/2009  Neck circumference: 44cm   General- alert, oriented, no acute distress  CV- regular rate and rhythm. No murmurs, rubs, or gallops   Lungs- clear to auscultation bilaterally. Diminished breath sounds in lower lung fields     Gem sleeping score: 13/24  Last Basic Metabolic Panel:  NA      136   2/1/2017   POTASSIUM      3.7   2/1/2017  CHLORIDE      102   2/1/2017  HOOD      8.9   2/1/2017  CO2       28   2/1/2017  BUN        9   2/1/2017  CR     0.79   2/1/2017  GLC      116   2/1/2017    Assessment/Plan:   Bia Bill is a 52 yo female with a history of HTN, COPD, mixed bipolar I, personality disorder, GILES, RLS, and polycythemia presenting with apnea symptoms looking for approval for right shoulder surgery tomorrow.     # Moderate to low-severe sleep apnea:   From an apnea perspective, she has been doing well. She was last seen in sleep clinic in 2010. She has been 100% compliance with an AHI of 1.0. She reports some pressure concerns wanting increased pressure. Her settings were changed today to auto-adjust 5-20 cm H2O, and ramp of 4 cm H2O. A new heating plate was ordered as well. In regards to her surgery tomorrow, she should bring her CPAP machine and it should be used " immediately in post-op. Some other lary-operative precautions should also take place including raising the bed to 45 degrees instead of flat while in recovery and waiting until patient is alert before extubating. Additionally, an overnight oximetry test will be ordered for 1 month post-op in order to avoid interference with pain medications. This test is necessary considering her polycythemia diagnosis. While her initial sleep study did not have any significant desaturation, and her most recent BMP did not have a compensatory increased CO2, she should still undergo the test at some point given the diagnosis of polycythemia.

## 2017-02-06 NOTE — TELEPHONE ENCOUNTER
"[facility] asking if Bia is cleared for surgery tomorrow @ 7:30am.  She spoke with Bia and she states that she saw Dr. Watts on 2/3/17 and seeing sleep study MD today @ 3pm.  She states that Dr. Watts cleared her for surgery, but no notes seen to that affect.  Silvana needs \"someone\" to clear her for surgery.  Can you review and advise.  Thank you..Lizzette Mcintosh    "

## 2017-02-06 NOTE — PROGRESS NOTES
EVERETT CAME TO SLEEP LAB AND RECEIVED NEW TUBING, WHITE FILTERS, AIRFIT F10 SMALL MASK/HEADGEAR, 4i WATER CHAMBER. I AM OUT OF STOCK FOR BLACK FILTER, TOLD HER SHE CAN WALK INTO [facility] AND GET ONE.

## 2017-02-07 ENCOUNTER — TRANSFERRED RECORDS (OUTPATIENT)
Dept: HEALTH INFORMATION MANAGEMENT | Facility: CLINIC | Age: 52
End: 2017-02-07

## 2017-02-08 DIAGNOSIS — G47.33 OSA (OBSTRUCTIVE SLEEP APNEA): Primary | ICD-10-CM

## 2017-02-09 ENCOUNTER — TELEPHONE (OUTPATIENT)
Dept: FAMILY MEDICINE | Facility: CLINIC | Age: 52
End: 2017-02-09

## 2017-02-09 NOTE — TELEPHONE ENCOUNTER
Bia bueno had shoulder surgery on Tuesday 2/7/17 and she states that they did more surgery than expected.  She is requesting help with a HomeHealth Aide if possible.  She states that she needs help with personal hygiene and dressing.  Please call and assess.  Thank you..Lizzette Mcintosh

## 2017-02-10 ENCOUNTER — ANTICOAGULATION THERAPY VISIT (OUTPATIENT)
Dept: ANTICOAGULATION | Facility: CLINIC | Age: 52
End: 2017-02-10
Payer: COMMERCIAL

## 2017-02-10 DIAGNOSIS — Z79.01 LONG TERM CURRENT USE OF ANTICOAGULANT THERAPY: ICD-10-CM

## 2017-02-10 DIAGNOSIS — I73.9 PVD (PERIPHERAL VASCULAR DISEASE) WITH CLAUDICATION (H): Primary | ICD-10-CM

## 2017-02-10 PROCEDURE — 99207 ZZC NO CHARGE NURSE ONLY: CPT | Performed by: REGISTERED NURSE

## 2017-02-10 NOTE — PROGRESS NOTES
ANTICOAGULATION FOLLOW-UP CLINIC VISIT    Patient Name:  Bia Bill  Date:  2/10/2017  Contact Type:  Telephone/ discussed with pt    SUBJECTIVE:     Patient Findings     Positives Medication Changes (bridging with Lovenox), Dental/Other Procedures (Shoulder surgery 2/7), Intentional hold of therapy (1/31 - 2/6)    Comments Pt did not make appt for INR today. Reinforced importance of getting INR so Lovenox can be d/c'd. States she will arrange a ride with [employer] for Monday.            OBJECTIVE    INR   Date Value Ref Range Status   01/20/2017 2.55* 0.86 - 1.14 Final     Comment:     Special tube used to correct for high hematocrit       ASSESSMENT / PLAN  No question data found.  Anticoagulation Summary as of 2/10/2017     INR goal 2.0-3.0   Selected INR No new INR was available at the time of this encounter.   Maintenance plan 15 mg (5 mg x 3) on Mon; 10 mg (5 mg x 2) all other days   Full instructions 15 mg on Mon; 10 mg all other days   Weekly total 75 mg   No change documented Brenna Rausch, TAWANA   Plan last modified Brenna Rausch, RN (12/16/2016)   Next INR check 2/13/2017   Priority INR   Target end date Indefinite    Indications   Long term current use of anticoagulant therapy [Z79.01]  DVT (deep venous thrombosis) (H) [I82.409]  PVD (peripheral vascular disease) with claudication (H) [I73.9]         Anticoagulation Episode Summary     INR check location     Preferred lab     Send INR reminders to [facility]    Comments * lab draw due to elevated hematocrit (fingerstick meters don't work). LEFT LE. STENT x 3 LEFT UPPER LEG. Previous arterial clot. Bridging instructions for 2/7/17 surgery in [facility] note from 1/19/17.      Anticoagulation Care Providers     Provider Role Specialty Phone number    Kd Leong MD [facility] [phone]            See the Encounter Report to view Anticoagulation Flowsheet and Dosing Calendar (Go to Encounters tab in chart review, and  find the Anticoagulation Therapy Visit)    Brenna Rausch RN

## 2017-02-10 NOTE — TELEPHONE ENCOUNTER
Patient had surgery on her right shoulder 2/7/16. She is having a hard time at home. She said that they had to cut the muscle in her shoulder to repair other tissue and had to reattach it. She is having a very difficult time changing her dressing daily, dressing and other personal hygiene. She is requesting for a home health aid. Please advise in Dr. Leong's absence. Thank you.  Aubree Richardson RN

## 2017-02-13 ENCOUNTER — ANTICOAGULATION THERAPY VISIT (OUTPATIENT)
Dept: ANTICOAGULATION | Facility: CLINIC | Age: 52
End: 2017-02-13
Payer: COMMERCIAL

## 2017-02-13 DIAGNOSIS — I82.409 DVT (DEEP VENOUS THROMBOSIS) (H): ICD-10-CM

## 2017-02-13 DIAGNOSIS — I73.9 PVD (PERIPHERAL VASCULAR DISEASE) (H): ICD-10-CM

## 2017-02-13 DIAGNOSIS — D75.1 POLYCYTHEMIA, SECONDARY: ICD-10-CM

## 2017-02-13 DIAGNOSIS — I73.9 PVD (PERIPHERAL VASCULAR DISEASE) WITH CLAUDICATION (H): ICD-10-CM

## 2017-02-13 DIAGNOSIS — Z79.01 LONG TERM CURRENT USE OF ANTICOAGULANT THERAPY: ICD-10-CM

## 2017-02-13 DIAGNOSIS — Z86.718 HISTORY OF DEEP VENOUS THROMBOSIS: ICD-10-CM

## 2017-02-13 LAB
BASOPHILS # BLD AUTO: 0 10E9/L (ref 0–0.2)
BASOPHILS NFR BLD AUTO: 0.4 %
DIFFERENTIAL METHOD BLD: ABNORMAL
EOSINOPHIL # BLD AUTO: 0.1 10E9/L (ref 0–0.7)
EOSINOPHIL NFR BLD AUTO: 1.2 %
ERYTHROCYTE [DISTWIDTH] IN BLOOD BY AUTOMATED COUNT: 21.4 % (ref 10–15)
HCT VFR BLD AUTO: 57 % (ref 35–47)
HGB BLD-MCNC: 18.1 G/DL (ref 11.7–15.7)
IMM GRANULOCYTES # BLD: 0 10E9/L (ref 0–0.4)
IMM GRANULOCYTES NFR BLD: 0.2 %
INR PPP: 2.16 (ref 0.86–1.14)
LYMPHOCYTES # BLD AUTO: 1.7 10E9/L (ref 0.8–5.3)
LYMPHOCYTES NFR BLD AUTO: 33.3 %
MCH RBC QN AUTO: 28.4 PG (ref 26.5–33)
MCHC RBC AUTO-ENTMCNC: 31.8 G/DL (ref 31.5–36.5)
MCV RBC AUTO: 89 FL (ref 78–100)
MONOCYTES # BLD AUTO: 0.3 10E9/L (ref 0–1.3)
MONOCYTES NFR BLD AUTO: 6.6 %
NEUTROPHILS # BLD AUTO: 2.9 10E9/L (ref 1.6–8.3)
NEUTROPHILS NFR BLD AUTO: 58.3 %
PLATELET # BLD AUTO: 108 10E9/L (ref 150–450)
RBC # BLD AUTO: 6.38 10E12/L (ref 3.8–5.2)
WBC # BLD AUTO: 5 10E9/L (ref 4–11)

## 2017-02-13 PROCEDURE — 99207 ZZC NO CHARGE NURSE ONLY: CPT

## 2017-02-13 PROCEDURE — 85025 COMPLETE CBC W/AUTO DIFF WBC: CPT | Performed by: FAMILY MEDICINE

## 2017-02-13 PROCEDURE — 85610 PROTHROMBIN TIME: CPT | Performed by: FAMILY MEDICINE

## 2017-02-13 PROCEDURE — 36415 COLL VENOUS BLD VENIPUNCTURE: CPT | Performed by: FAMILY MEDICINE

## 2017-02-13 NOTE — TELEPHONE ENCOUNTER
Spoke with patient and advised her to call her surgeon's office and get instructions for PHYSICAL THERAPY and/or OT to get guidance in how to care for self at this time. She agreed with plan.  Mundo Conde RN

## 2017-02-13 NOTE — TELEPHONE ENCOUNTER
This is a very expected outcome, her calling after surgery and asking for someone else to take care of her instead of doing the hard work to take care of herself.       Bia has a history of not taking responsibility for herself and wanting others to fix her.  I do not think it is in her best interest to  Hire a HHA.    Bia needs to work much harder on taking care of herself.  She won't get better if she does not work hard on this, and she needs to take responsibility for this.     Kd

## 2017-02-13 NOTE — MR AVS SNAPSHOT
Bia Bill   2/13/2017   Anticoagulation Therapy Visit    Description:  51 year old female   Provider:  Angelina Nj, RN   Department:  [facility]           INR as of 2/13/2017     Today's INR 2.16      Anticoagulation Summary as of 2/13/2017     INR goal 2.0-3.0   Today's INR 2.16   Full instructions 15 mg on Mon; 10 mg all other days   Next INR check 2/27/2017    Indications   Long term current use of anticoagulant therapy [Z79.01]  DVT (deep venous thrombosis) (H) [I82.409]  PVD (peripheral vascular disease) with claudication (H) [I73.9]         February 2017 Details    Sun Mon Tue Wed Thu Fri Sat        1               2               3               4                 5               6               7               8               9               10               11                 12               13      15 mg   See details      14      10 mg         15      10 mg         16      10 mg         17      10 mg         18      10 mg           19      10 mg         20      15 mg         21      10 mg         22      10 mg         23      10 mg         24      10 mg         25      10 mg           26      10 mg         27            28                    Date Details   02/13 This INR check       Date of next INR:  2/27/2017         How to take your warfarin dose     To take:  10 mg Take 2 of the 5 mg tablets.    To take:  15 mg Take 3 of the 5 mg tablets.

## 2017-02-13 NOTE — PROGRESS NOTES
ANTICOAGULATION FOLLOW-UP CLINIC VISIT    Patient Name:  Bia Bill  Date:  2/13/2017  Contact Type:  Telephone    SUBJECTIVE:     Patient Findings     Positives Change in medications (States she has pain medications after surgery, has not been helping as much as she would like), Bruising (Bruising around Lovenox injection sites), Intentional hold of therapy (Intentional hold for surgery on bone spurs, she also had a muscle in her shoulder relocated), Activity level change (Will be starting PT)    Comments Patient will start her usual warfarin dose, she will use her last Lovenox injection tonight. Patient sees her surgeon next Monday in [place].           OBJECTIVE    INR   Date Value Ref Range Status   02/13/2017 2.16 (H) 0.86 - 1.14 Final       ASSESSMENT / PLAN  No question data found.  Anticoagulation Summary as of 2/13/2017     INR goal 2.0-3.0   Today's INR 2.16   Maintenance plan 15 mg (5 mg x 3) on Mon; 10 mg (5 mg x 2) all other days   Full instructions 15 mg on Mon; 10 mg all other days   Weekly total 75 mg   No change documented Angelina Nj RN   Plan last modified Brenna Rausch, RN (12/16/2016)   Next INR check 2/27/2017   Priority INR   Target end date Indefinite    Indications   Long term current use of anticoagulant therapy [Z79.01]  DVT (deep venous thrombosis) (H) [I82.409]  PVD (peripheral vascular disease) with claudication (H) [I73.9]         Anticoagulation Episode Summary     INR check location     Preferred lab     Send INR reminders to [facility] POOL    Comments * lab draw due to elevated hematocrit (fingerstick meters don't work). LEFT LE. STENT x 3 LEFT UPPER LEG. Previous arterial clot. Bridging instructions for 2/7/17 surgery in [facility] note from 1/19/17.      Anticoagulation Care Providers     Provider Role Specialty Phone number    Kd Leong MD [facility] [phone]            See the Encounter Report to view Anticoagulation Flowsheet and  Dosing Calendar (Go to Encounters tab in chart review, and find the Anticoagulation Therapy Visit)        Angelina Nj RN

## 2017-02-13 NOTE — TELEPHONE ENCOUNTER
A HHA is someone she would hire.   I suggest she work with physical therapy to discuss dressing strategies.  If she has further questions reguarding the recovery from her surgery she should contact her surgeon.

## 2017-02-14 ENCOUNTER — TRANSFERRED RECORDS (OUTPATIENT)
Dept: HEALTH INFORMATION MANAGEMENT | Facility: CLINIC | Age: 52
End: 2017-02-14

## 2017-02-14 DIAGNOSIS — I73.9 PVD (PERIPHERAL VASCULAR DISEASE) WITH CLAUDICATION (H): ICD-10-CM

## 2017-02-14 DIAGNOSIS — F33.1 MAJOR DEPRESSIVE DISORDER, RECURRENT EPISODE, MODERATE (H): ICD-10-CM

## 2017-02-14 RX ORDER — GABAPENTIN 600 MG/1
600 TABLET ORAL 3 TIMES DAILY
Qty: 90 TABLET | Refills: 1 | Status: SHIPPED | OUTPATIENT
Start: 2017-02-14 | End: 2017-03-16

## 2017-02-14 RX ORDER — ARIPIPRAZOLE 5 MG/1
7.5 TABLET ORAL AT BEDTIME
Qty: 45 TABLET | Refills: 0 | Status: SHIPPED | OUTPATIENT
Start: 2017-02-14 | End: 2017-03-16

## 2017-02-14 NOTE — TELEPHONE ENCOUNTER
gabapentin      Last Written Prescription Date:  12/22/16  Last Fill Quantity: 135,   # refills: 1  Last Office Visit with [facility], [facility] or [facility] prescribing provider: 2/1/17  Last date of pharmacy refill:  1/18/17    Future Office visit:    Next 5 appointments (look out 90 days)     Feb 20, 2017 11:00 AM CST   Return Visit with Patel Rice MD   [facility] [place] ([facility] [place])    [address]  [place] [address]   [phone]            Mar 01, 2017  9:30 AM CST   Return Visit with [facility] ([facility])    [address] [address]   [phone]            Mar 15, 2017  9:30 AM CDT   [facility]    [address] [address]   [phone]            Mar 24, 2017  2:30 PM CDT   Return Visit with Joshua Watts MD   [facility] ([facility])    [facility]  [address]  [place] [address]   [phone]                   Routing refill request to provider for review/approval because:  Drug not on the [facility], [facility] or [facility] refill protocol or controlled substance

## 2017-02-14 NOTE — TELEPHONE ENCOUNTER
Patient requesting refill of Abilify 5 mg tabs # 45. Patient's last visit with Lisa Bai RN, MS, APRN was 01/17/2017, and she was referred back to her PCP for med management and refills.       From 01/18/2017 office visit with Lisa Bai RN, MS, APRN:    DSM DIAGNOSIS:  296.80 Unspecified Bipolar and Related Disorder    305.00 Alcohol Use Disorder, in sustained remission     Assessment:  Patient reports her mood has been good. She reports increased anxiety related to upcoming shoulder surgery. We discussed this is a pretty normal in response to this type of stressor. No medications changes or adjustments are needed. Patient plans to continue with individual therapy.      Medication side effects and alternatives were reviewed.      Treatment Plan:  Continue lamotrigine (Lamictal) 200 mg daily, aripiprazole (Abilify) 7.5 mg daily, bupropion (Wellbutrin)  mg daily and trazodone (Desyrel) 150 mg at bedtime.   Continue individual therapy.   Follow up with primary care provider in 1-2 months.      - Safety plan was reviewed; to the ER as needed or call after hours crisis line; [phone]  - Education and counseling was done regarding use of medications, psychotherapy options  - Call [phone] for appointment or to speak to a nurse.    -Office hours: Monday through Thursday 8:00 am to 4:30 pm; Friday 8:00 am to Noon.                  Signed: Lisa Bai RN, MS, CNS-BC

## 2017-02-22 ENCOUNTER — OFFICE VISIT (OUTPATIENT)
Dept: ORTHOPEDICS | Facility: CLINIC | Age: 52
End: 2017-02-22
Payer: COMMERCIAL

## 2017-02-22 VITALS — HEIGHT: 59 IN | BODY MASS INDEX: 45.04 KG/M2 | WEIGHT: 223.4 LBS | RESPIRATION RATE: 16 BRPM

## 2017-02-22 DIAGNOSIS — M25.811 SHOULDER IMPINGEMENT, RIGHT: ICD-10-CM

## 2017-02-22 DIAGNOSIS — Z98.890 S/P ARTHROSCOPY OF SHOULDER: Primary | ICD-10-CM

## 2017-02-22 PROCEDURE — 99024 POSTOP FOLLOW-UP VISIT: CPT | Performed by: ORTHOPAEDIC SURGERY

## 2017-02-22 RX ORDER — AMOXICILLIN 250 MG
CAPSULE ORAL
COMMUNITY
Start: 2017-02-07 | End: 2017-03-16

## 2017-02-22 RX ORDER — HYDROCODONE BITARTRATE AND ACETAMINOPHEN 5; 325 MG/1; MG/1
1-2 TABLET ORAL EVERY 8 HOURS PRN
Qty: 60 TABLET | Refills: 0 | Status: SHIPPED | OUTPATIENT
Start: 2017-02-22 | End: 2017-03-16

## 2017-02-22 ASSESSMENT — PAIN SCALES - GENERAL: PAINLEVEL: EXTREME PAIN (9)

## 2017-02-22 NOTE — NURSING NOTE
"Chief Complaint   Patient presents with     Surgical Followup     Right shoulder arthroscopy, cuff debridement, biceps tenodesis. DOS: 2/7/17. 2 week post op. Patient states her shoulder is feeling painful. She ran out of pain meds on Sunday, she has been taking tylenol and it is not working. She has remained in the sling. She has not started PT yet.        Initial Resp 16  Ht 1.499 m (4' 11\")  Wt 101.3 kg (223 lb 6.4 oz)  LMP 09/27/2009  BMI 45.12 kg/m2 Estimated body mass index is 45.12 kg/(m^2) as calculated from the following:    Height as of this encounter: 1.499 m (4' 11\").    Weight as of this encounter: 101.3 kg (223 lb 6.4 oz).  Medication Reconciliation: truong Vega Certified Medical Assistant    "

## 2017-02-22 NOTE — PROGRESS NOTES
chief complaint:   Chief Complaint   Patient presents with     Surgical Followup     Right shoulder arthroscopy, cuff debridement, biceps tenodesis. DOS: 2/7/17. 2 week post op. Patient states her shoulder is feeling painful. She ran out of pain meds on Sunday, she has been taking tylenol and it is not working. She has remained in the sling. She has not started PT yet.        SURGERY: Right shoulder arthroscopy ( [facility] ).  1. Right shoulder arthroscopic proximal biceps tenodesis   2. Right shoulder arthroscopic distal clavical resection   3. Right shoulder arthroscopic labral debridement   4. Right shoulder arthroscopic partial thickness rotator cuff debridement  5. Right shoulder arthroscopic subacromial decompression with partial acromioplasty    DATE OF SURGERY: 2/7/2017.    HISTORY OF PRESENT ILLNESS:  Bia Bill is a 51 year old female seen for postoperative evaluation of a right shoulder arthroscopy with proximal biceps tenodesis, distal clavical resection, labral debridement, rotator cuff debridement, and subacromial decompression for right shoulder impingement syndrome, acromialclavicular arthritis and a low-grade partial thickness tearing of the anterior supraspinatus with type 2 SLAP tear. Surgery occurred 2 weeks ago. Returns today stating her shoulder is very painful. Her pain is located diffusely over the shoulder and into the arm. For pain, she will take 1 tablet every 6 hours. If her pain is really bad, she will take 2 tablets. She is out of pain medication. Since surgery she  Has been in the sling, and reports non-weightbearing of the right upper extremity. No problems with the surgical wounds. Denies fevers chills or night sweats. No associated numbness or tingling. Has been doing gentle range of motion exercises since surgery as recommended. She has not started physical therapy yet. Recall she was 10/10 pain preop, and understands there may be more to her pain than findings  within the shoulder and that surgery may not alleviate her pain.    OR Findings: Proximal biceps tendinosis with longitudinal striational tearing. Type 2 SLAP tear. Partial thickness tear at the deep fibers of the subscapularis without full-thickness tear. Glenohumeral synovitis. Intact articular-sided rotator cuff. Low grade partial thickness bursal sided rotator cuff tear, less than 30% far anterolateral supraspinatus. Prominent subacromial and subclavicular bone spurs with a tight arthritic acromioclavicular joint. Thickened subacromial bursa.    Past medical history:   Past Medical History   Diagnosis Date     Anxiety state, unspecified      Bipolar I disorder, most recent episode (or current) unspecified      Depressive disorder, not elsewhere classified      DVT, lower extremity (H)      Polycythemia, secondary      [place] HGB        Past surgical history:   Past Surgical History   Procedure Laterality Date     Lithotripsy  2004     Lithotrypsy     Biliary stent single use cov       3 stints in left leg     D & c  10/26/09     with uterine ablation     Hysterectomy, pap no longer indicated  1-4-2010     Esophagoscopy, gastroscopy, duodenoscopy (egd), combined  4/21/2014     Procedure: Gastroscopy;  Surgeon: Moris Thomas MD;  Location: [facility]     Bone marrow biopsy, bone specimen, needle/trocar N/A 11/17/2014     Procedure: BIOPSY BONE MARROW;  Surgeon: Hay Aaron MD;  Location: [facility]     Medications:   Current Outpatient Prescriptions:      senna-docusate (SENOKOT-S;PERICOLACE) 8.6-50 MG per tablet, , Disp: , Rfl:      gabapentin (NEURONTIN) 600 MG tablet, Take 1 tablet (600 mg) by mouth 3 times daily, Disp: 90 tablet, Rfl: 1     ARIPiprazole (ABILIFY) 5 MG tablet, Take 1.5 tablets (7.5 mg) by mouth At Bedtime, Disp: 45 tablet, Rfl: 0     simvastatin (ZOCOR) 20 MG tablet, Take 1 tablet (20 mg) by mouth At Bedtime, Disp: 30 tablet, Rfl: 0     enoxaparin (LOVENOX) 100 MG/ML injection,  "Inject 1 mL (100 mg) Subcutaneous every 12 hours Please follow the instructions provided by Anticoag Clinic, Disp: 20 Syringe, Rfl: 0     traZODone (DESYREL) 150 MG tablet, Take 1 tablet (150 mg) by mouth At Bedtime, Disp: 90 tablet, Rfl: 1     lamoTRIgine (LAMICTAL) 200 MG tablet, Take 1 tablet (200 mg) by mouth daily, Disp: 60 tablet, Rfl: 1     allopurinol (ZYLOPRIM) 100 MG tablet, Take 1 tablet (100 mg) by mouth daily, Disp: 90 tablet, Rfl: 2     VITAMIN D3 1000 UNITS tablet, Take 1 tablet by mouth daily, Disp: , Rfl:      warfarin (COUMADIN) 5 MG tablet, Take 15mg by mouth Mondays and 10mg all other days or as directed by Anticoagulation Clinic, Disp: 65 tablet, Rfl: 11     furosemide (LASIX) 20 MG tablet, Take 1 tablet (20 mg) by mouth 2 times daily, Disp: 180 tablet, Rfl: 2     buPROPion (WELLBUTRIN XL) 150 MG 24 hr tablet, Take 1 tablet (150 mg) by mouth every morning, Disp: 90 tablet, Rfl: 3     mometasone-formoterol (DULERA) 100-5 MCG/ACT oral inhaler, Inhale 2 puffs into the lungs 2 times daily, Disp: 13 g, Rfl: 1     order for DME, Equipment being ordered: [id number]\" x2pair   \"20-30mmHg [facility] Hybrid Liners\" x 2 pair, Disp: 2 Units, Rfl: 11     EPINEPHrine (EPIPEN) 0.3 MG/0.3ML injection, Inject 0.3 mLs (0.3 mg) into the muscle once as needed for anaphylaxis, Disp: 0.6 mL, Rfl: 0     ipratropium - albuterol 0.5 mg/2.5 mg/3 mL (DUONEB) 0.5-2.5 (3) MG/3ML nebulization, Take 1 vial (3 mLs) by nebulization every 6 hours as needed for shortness of breath / dyspnea or wheezing USE THIS INSTEAD OF ALBUTEROL INHALER AS NEEDED FOR WHEEZE COUGH OR SHORTNESS OF BREATH, Disp: 30 vial, Rfl: 1     omeprazole (PRILOSEC) 20 MG capsule, Take 1 capsule (20 mg) by mouth 2 times daily, Disp: 180 capsule, Rfl: 3     levothyroxine (SYNTHROID, LEVOTHROID) 150 MCG tablet, Take 1 tablet (150 mcg) by mouth daily, Disp: 30 tablet, Rfl: 10     albuterol (PROAIR HFA, PROVENTIL HFA, VENTOLIN HFA) 108 (90 BASE) MCG/ACT inhaler, " "Inhale 2 puffs into the lungs every 6 hours as needed for shortness of breath / dyspnea or wheezing, Disp: 3 Inhaler, Rfl: 1     order for DME, Equipment being ordered: DEPENDS SIZE LARGE, Disp: 60 Month, Rfl: 5     order for DME, Equipment being ordered:   \"[id number]\" x2pair  \"20-30mmHg Farrow Hybrid Liners\" x1pair, Disp: 2 Device, Rfl: 11     order for DME, Equipment being ordered: INCONTINENCE PADS  QID, Disp: 1 Month, Rfl: 11     order for DME, Equipment being ordered: CPAP supplies needed, Disp: 3 Month, Rfl: 3     albuterol (2.5 MG/3ML) 0.083% nebulizer solution, Take 1 vial (2.5 mg) by nebulization every 6 hours as needed for shortness of breath / dyspnea or wheezing, Disp: 1 Box, Rfl: 0     acetaminophen (TYLENOL) 500 MG tablet, Take 500-1,000 mg by mouth every 6 hours as needed for mild pain, Disp: , Rfl:      ORDER FOR DME, Equipment being ordered: CPAP, Disp: 1 Units, Rfl: 11     ASPIRIN NOT PRESCRIBED, INTENTIONAL,, by Other route continuous prn., Disp: , Rfl: 0    Allergies:   Allergies   Allergen Reactions     Percocet [Codeine] Anaphylaxis     Asa [Aspirin] Hives     Aspirin causes seizures and hives, throat swelling       Bee      Darvocet [Acetaminophen] Anaphylaxis     Darvocet,Percocet      Lyrica [Pregabalin] Swelling     dizziness     Percocet [Oxycodone-Acetaminophen] Swelling     Iodine Rash     Reaction to topical betadine     Tape [Adhesive Tape] Rash     This document serves as a record of the services and decisions personally performed and made by Patel Rice MD. It was created on his behalf by Licha Nicole, a trained medical scribe. The creation of this document is based the provider's statements to the medical scribe.    Scribemre Nicole 11:41 AM 2/22/2017     REVIEW OF SYSTEMS:   CONSTITUTIONAL:NEGATIVE for fever, chills, night sweats  INTEGUMENTARY/SKIN: NEGATIVE for worrisome wound problems or redness.  MUSCULOSKELETAL:See HPI above  NEURO: NEGATIVE for weakness, dizziness or " "paresthesias    PHYSICAL EXAM:  Resp 16  Ht 1.499 m (4' 11\")  Wt 101.3 kg (223 lb 6.4 oz)  LMP 09/27/2009  BMI 45.12 kg/m2   GENERAL APPEARANCE: healthy, alert, no distress  SKIN: no suspicious lesions or rashes  NEURO: Normal strength and tone, mentation intact and speech normal  PSYCH:  mentation appears normal and affect normal, not anxious  RESPIRATORY: No increased work of breathing.    right UPPER EXTREMITY:  Sensation intact to light touch in median, radial, ulnar and axillary nerve distributions  Palpable 2+ radial pulse, brisk capillary refill to all fingers, wwp  Intact epl fpl fdp edc wrist flexion/extension biceps triceps deltoid    right SHOULDER:  Shoulder Inspection: Mild ecchymosis over the mid-distal anterior arm, mild swelling, no erythema, no maceration, no deformity  Tender: diffuse tenderness in arm and shoulder  Range of Motion:   Active and passive:not tested  Strength: not tested    X-RAY: none reviewed.      Impression: Bia Bill is a 51 year old female 2 weeks status post right shoulder arthroscopy and proximal biceps tenodesis, distal clavical resection, labral debridement, rotator cuff debridement, and subacromial decompression, doing well.     Plan: routine postoperative shoulder arthroscopy protocol  * WB status: no lifting, pushing, pulling, reaching at this time.  * continue with sling.  * Activity: avoid aggravating activities.  * Rest.  * Ice twice daily to three times daily.  * Compression wrap as needed for swelling  * Elevation of extremity to reduce swelling  * PT ordered for strengthening, stretching and range of motion exercises.  * Tylenol as needed for pain  * return to clinic 4 weeks, sooner as needed.    The information in this document, created by a scribe for me, accurate reflects the services I personally performed and the decisions made by me. I have reviewed and approved this document for accuracy.      Patel Rice M.D., M.S.  Dept. of Orthopaedic " Surgery  [facility]

## 2017-02-22 NOTE — PATIENT INSTRUCTIONS
Please remember to call and schedule a follow up appointment if one was recommended at your earliest convenience.  Orthopedics CLINIC HOURS TELEPHONE NUMBER   Dr. Dwayne Desai  Certified Medical Assistant   Monday & Wednesday   8am - 5pm  Thursday 1pm - 5pm  Friday 8am -11:30am Specialty schedulers:   [phone] to schedule your surgery.  Main Clinic:   [phone] to make an appointment with any provider.    Urgent Care locations:    [facility] Monday-Friday Closed  Saturday-Sunday 9am-5pm      Monday-Friday 12pm - 8pm  Saturday-Sunday 9am-5pm [phone]     If SURGERY has been recommended, please call our Specialty Schedulers at [phone] to schedule your procedure.    If you need a medication refill, please contact your pharmacy. Please allow 3 business days for your refill to be completed.    If an MRI or CT scan has been recommended, please call [facility] Schedulers at [phone] to schedule your appointment.  Use [employer] (secure e-mail communication and access to your chart) to send a message or to make an appointment. Please ask how you can sign up for [employer].  Your care team's suggested websites for health information:   [email or web address] : Up to date and easily searchable information on multiple topics.   Www.[email or web address].[facility].[place].us : [place] dept of heat, public health issues in [place], N1N1

## 2017-02-22 NOTE — MR AVS SNAPSHOT
After Visit Summary   2/22/2017    Bia Bill    MRN: [id number]           Patient Information     Date Of Birth          1965        Visit Information        Provider Department      2/22/2017 10:30 AM Patel Rice MD [facility]        Care Instructions    Please remember to call and schedule a follow up appointment if one was recommended at your earliest convenience.  Orthopedics CLINIC HOURS TELEPHONE NUMBER   Dr. Dwayne Desai  Certified Medical Assistant   Monday & Wednesday   8am - 5pm  Thursday 1pm - 5pm  Friday 8am -11:30am Specialty schedulers:   [phone] to schedule your surgery.  Main Clinic:   [phone] to make an appointment with any provider.    Urgent Care locations:    [facility] Monday-Friday Closed  Saturday-Sunday 9am-5pm      Monday-Friday 12pm - 8pm  Saturday-Sunday 9am-5pm [phone]     If SURGERY has been recommended, please call our Specialty Schedulers at [phone] to schedule your procedure.    If you need a medication refill, please contact your pharmacy. Please allow 3 business days for your refill to be completed.    If an MRI or CT scan has been recommended, please call [facility] Schedulers at [phone] to schedule your appointment.  Use [employer] (secure e-mail communication and access to your chart) to send a message or to make an appointment. Please ask how you can sign up for [employer].  Your care team's suggested websites for health information:   [email or web address] : Up to date and easily searchable information on multiple topics.   Www.health.[facility].[place].us : [place] de[place] of heat[place], public health issues in [place], N1N1            Follow-ups after your visit        Your next 10 appointments already scheduled     Feb 24, 2017  1:40 PM CST   LAB with [facility] ([facility])    [address] [address]    [phone]           Patient must bring picture ID.  Patient should be prepared to give a urine specimen  Please do not eat 10-12 hours before your appointment if you are coming in fasting for labs on lipids, cholesterol, or glucose (sugar).  Pregnant women should follow their Care Team instructions. Water with medications is okay. Do not drink coffee or other fluids.   If you have concerns about taking  your medications, please ask at office or if scheduling via [employer], send a message by clicking on Secure Messaging, Message Your Care Team.            Feb 27, 2017  9:55 AM CST   LAB with [facility] ([facility])    [address] [address]   [phone]           Patient must bring picture ID.  Patient should be prepared to give a urine specimen  Please do not eat 10-12 hours before your appointment if you are coming in fasting for labs on lipids, cholesterol, or glucose (sugar).  Pregnant women should follow their Care Team instructions. Water with medications is okay. Do not drink coffee or other fluids.   If you have concerns about taking  your medications, please ask at office or if scheduling via [employer], send a message by clicking on Secure Messaging, Message Your Care Team.            Mar 01, 2017  9:30 AM CST   Return Visit with Kd Morris   [facility] ([facility])    [address] [address]   [phone]            Mar 06, 2017  1:00 PM CST   Oximetry  with SLEEP LAB, BED FIVE   [facility]    [address]  [facility] [address]   [phone]            Mar 07, 2017  1:00 PM CST   Oximetry Drop Off with SLEEP LAB, BED FIVE   [facility] ([facility])    [address]  [facility] [address]   [phone]            Mar 15, 2017  9:30 AM CDT   Return Visit with Kd Morris   [facility]    [address] [address]   [phone]            Mar 24, 2017  2:00 PM CDT   LAB with [facility] ([facility])    [address] [address]   [phone]           Patient must bring picture ID.  Patient should be prepared to give a urine specimen  Please do not eat 10-12 hours before your appointment if you are coming in fasting for labs on lipids, cholesterol, or glucose (sugar).  Pregnant women should follow their Care Team instructions. Water with medications is okay. Do not drink coffee or other fluids.   If you have concerns about taking  your medications, please ask at office or if scheduling via [employer], send a message by clicking on Secure Messaging, Message Your Care Team.            Mar 24, 2017  2:30 PM CDT   Return Visit with Joshua Watts MD   [facility] ([facility])    [facility]  [address]  [place] [facility] [address]   [phone]              Who to contact     If you have questions or need follow up information about today's clinic visit or your schedule please contact [facility] directly at [phone].  Normal or non-critical lab and imaging results will be communicated to you by [employer], letter or phone within 4 business days after the clinic has received the results. If you do not hear from us within 7 days, please contact the clinic through [employer] or phone. If you have a critical or abnormal lab result, we will notify you by phone as soon as possible.  Submit refill requests through [employer] or call your pharmacy and they will forward the refill request to us. Please allow 3 business days for your refill to be completed.          Additional Information About Your Visit        [employer] Information     [employer] lets you send messages to your doctor, view your test results, renew your  "prescriptions, schedule appointments and more. To sign up, go to [email or web address] . Click on \"Log in\" on the left side of the screen, which will take you to the Welcome page. Then click on \"Sign up Now\" on the right side of the page.     You will be asked to enter the access code listed below, as well as some personal information. Please follow the directions to create your username and password.     Your access code is: [email or web address]  Expires: 3/29/2017  2:13 PM     Your access code will  in 90 days. If you need help or a new code, please call your [place] clinic or [phone].        Care EveryWhere ID     This is your Care EveryWhere ID. This could be used by other organizations to access your [place] medical records  [id number]        Your Vitals Were     Respirations Height Last Period BMI (Body Mass Index)          16 1.499 m (4' 11\") 2009 45.12 kg/m2         Blood Pressure from Last 3 Encounters:   17 131/70   17 135/63   17 110/62    Weight from Last 3 Encounters:   17 101.3 kg (223 lb 6.4 oz)   17 102.3 kg (225 lb 9.6 oz)   17 101.9 kg (224 lb 9.6 oz)              Today, you had the following     No orders found for display       Primary Care Provider Office Phone # Fax #    Kd Leong [phone]       [facility] [address] [address] [facility] [address] [facility] [address]        Thank you!     Thank you for choosing [facility]  for your care. Our goal is always to provide you with excellent care. Hearing back from our patients is one way we can continue to improve our services. Please take a few minutes to complete the written survey that you may receive in the mail after your visit with us. Thank you!             Your Updated Medication List - Protect others around you: Learn how to safely use, store and throw away your medicines at www.disposemymeds.org.          This list is accurate as of: 17 10:37 AM.  Always use " your most recent med list.                   Brand Name Dispense Instructions for use    acetaminophen 500 MG tablet    TYLENOL     Take 500-1,000 mg by mouth every 6 hours as needed for mild pain       * albuterol (2.5 MG/3ML) 0.083% neb solution     1 Box    Take 1 vial (2.5 mg) by nebulization every 6 hours as needed for shortness of breath / dyspnea or wheezing       * albuterol 108 (90 BASE) MCG/ACT Inhaler    PROAIR HFA/PROVENTIL HFA/VENTOLIN HFA    3 Inhaler    Inhale 2 puffs into the lungs every 6 hours as needed for shortness of breath / dyspnea or wheezing       allopurinol 100 MG tablet    ZYLOPRIM    90 tablet    Take 1 tablet (100 mg) by mouth daily       ARIPiprazole 5 MG tablet    ABILIFY    45 tablet    Take 1.5 tablets (7.5 mg) by mouth At Bedtime       * ASPIRIN NOT PRESCRIBED    INTENTIONAL     by Other route continuous prn.       buPROPion 150 MG 24 hr tablet    WELLBUTRIN XL    90 tablet    Take 1 tablet (150 mg) by mouth every morning       cholecalciferol 1000 UNIT tablet    vitamin D     Take 1 tablet by mouth daily       enoxaparin 100 MG/ML injection    LOVENOX    20 Syringe    Inject 1 mL (100 mg) Subcutaneous every 12 hours Please follow the instructions provided by [facility]       EPINEPHrine 0.3 MG/0.3ML injection     0.6 mL    Inject 0.3 mLs (0.3 mg) into the muscle once as needed for anaphylaxis       furosemide 20 MG tablet    LASIX    180 tablet    Take 1 tablet (20 mg) by mouth 2 times daily       gabapentin 600 MG tablet    NEURONTIN    90 tablet    Take 1 tablet (600 mg) by mouth 3 times daily       ipratropium - albuterol 0.5 mg/2.5 mg/3 mL 0.5-2.5 (3) MG/3ML neb solution    DUONEB    30 vial    Take 1 vial (3 mLs) by nebulization every 6 hours as needed for shortness of breath / dyspnea or wheezing USE THIS INSTEAD OF ALBUTEROL INHALER AS NEEDED FOR WHEEZE COUGH OR SHORTNESS OF BREATH       lamoTRIgine 200 MG tablet    LaMICtal    60 tablet    Take 1 tablet (200 mg) by  "mouth daily       levothyroxine 150 MCG tablet    SYNTHROID/LEVOTHROID    30 tablet    Take 1 tablet (150 mcg) by mouth daily       mometasone-formoterol 100-5 MCG/ACT oral inhaler    DULERA    13 g    Inhale 2 puffs into the lungs 2 times daily       omeprazole 20 MG CR capsule    priLOSEC    180 capsule    Take 1 capsule (20 mg) by mouth 2 times daily       * order for DME     1 Units    Equipment being ordered: CPAP       * order for DME     3 Month    Equipment being ordered: CPAP supplies needed       order for DME     1 Month    Equipment being ordered: INCONTINENCE PADS  QID       * order for DME     60 Month    Equipment being ordered: DEPENDS SIZE LARGE       * order for DME     2 Device    Equipment being ordered:   \"[id number]\" x2pair  \"20-30mmHg [place] Hybrid Liners\" x1pair       * order for DME     2 Units    Equipment being ordered: [id number]\" x2pair   \"20-30mmHg [place] Hybrid Liners\" x 2 pair       senna-docusate 8.6-50 MG per tablet    SENOKOT-S;PERICOLACE         simvastatin 20 MG tablet    ZOCOR    30 tablet    Take 1 tablet (20 mg) by mouth At Bedtime       traZODone 150 MG tablet    DESYREL    90 tablet    Take 1 tablet (150 mg) by mouth At Bedtime       warfarin 5 MG tablet    COUMADIN    65 tablet    Take 15mg by mouth Mondays and 10mg all other days or as directed by Anticoagulation Clinic       * Notice:  This list has 8 medication(s) that are the same as other medications prescribed for you. Read the directions carefully, and ask your doctor or other care provider to review them with you.      "

## 2017-02-27 ENCOUNTER — ANTICOAGULATION THERAPY VISIT (OUTPATIENT)
Dept: ANTICOAGULATION | Facility: CLINIC | Age: 52
End: 2017-02-27
Payer: COMMERCIAL

## 2017-02-27 ENCOUNTER — HOSPITAL ENCOUNTER (OUTPATIENT)
Dept: LAB | Facility: CLINIC | Age: 52
Discharge: HOME | End: 2017-02-27
Attending: INTERNAL MEDICINE | Admitting: INTERNAL MEDICINE
Payer: COMMERCIAL

## 2017-02-27 ENCOUNTER — TELEPHONE (OUTPATIENT)
Dept: FAMILY MEDICINE | Facility: CLINIC | Age: 52
End: 2017-02-27

## 2017-02-27 DIAGNOSIS — G25.81 RLS (RESTLESS LEGS SYNDROME): ICD-10-CM

## 2017-02-27 DIAGNOSIS — E78.00 HYPERCHOLESTEREMIA: ICD-10-CM

## 2017-02-27 DIAGNOSIS — I73.9 PVD (PERIPHERAL VASCULAR DISEASE) WITH CLAUDICATION (H): ICD-10-CM

## 2017-02-27 DIAGNOSIS — I82.409 DVT (DEEP VENOUS THROMBOSIS) (H): ICD-10-CM

## 2017-02-27 DIAGNOSIS — I73.9 PERIPHERAL VASCULAR DISEASE WITH CLAUDICATION (H): ICD-10-CM

## 2017-02-27 DIAGNOSIS — D75.1 POLYCYTHEMIA, SECONDARY: ICD-10-CM

## 2017-02-27 DIAGNOSIS — Z79.01 LONG TERM CURRENT USE OF ANTICOAGULANT THERAPY: ICD-10-CM

## 2017-02-27 DIAGNOSIS — M50.30 DDD (DEGENERATIVE DISC DISEASE), CERVICAL: ICD-10-CM

## 2017-02-27 LAB
BASOPHILS # BLD AUTO: 0 10E9/L (ref 0–0.2)
BASOPHILS NFR BLD AUTO: 0.7 %
DIFFERENTIAL METHOD BLD: ABNORMAL
EOSINOPHIL # BLD AUTO: 0.1 10E9/L (ref 0–0.7)
EOSINOPHIL NFR BLD AUTO: 1.4 %
ERYTHROCYTE [DISTWIDTH] IN BLOOD BY AUTOMATED COUNT: 20.7 % (ref 10–15)
HCT VFR BLD AUTO: 59.2 % (ref 35–47)
HGB BLD-MCNC: 19.3 G/DL (ref 11.7–15.7)
IMM GRANULOCYTES # BLD: 0 10E9/L (ref 0–0.4)
IMM GRANULOCYTES NFR BLD: 0.2 %
INR PPP: 1.94 (ref 0.86–1.14)
LYMPHOCYTES # BLD AUTO: 1.3 10E9/L (ref 0.8–5.3)
LYMPHOCYTES NFR BLD AUTO: 29.7 %
MCH RBC QN AUTO: 28.9 PG (ref 26.5–33)
MCHC RBC AUTO-ENTMCNC: 32.6 G/DL (ref 31.5–36.5)
MCV RBC AUTO: 89 FL (ref 78–100)
MONOCYTES # BLD AUTO: 0.3 10E9/L (ref 0–1.3)
MONOCYTES NFR BLD AUTO: 7.7 %
NEUTROPHILS # BLD AUTO: 2.6 10E9/L (ref 1.6–8.3)
NEUTROPHILS NFR BLD AUTO: 60.3 %
PLATELET # BLD AUTO: 112 10E9/L (ref 150–450)
RBC # BLD AUTO: 6.68 10E12/L (ref 3.8–5.2)
WBC # BLD AUTO: 4.3 10E9/L (ref 4–11)

## 2017-02-27 PROCEDURE — 85025 COMPLETE CBC W/AUTO DIFF WBC: CPT | Performed by: INTERNAL MEDICINE

## 2017-02-27 PROCEDURE — 36415 COLL VENOUS BLD VENIPUNCTURE: CPT | Performed by: INTERNAL MEDICINE

## 2017-02-27 PROCEDURE — 85610 PROTHROMBIN TIME: CPT | Performed by: INTERNAL MEDICINE

## 2017-02-27 PROCEDURE — 99207 ZZC NO CHARGE NURSE ONLY: CPT | Performed by: REGISTERED NURSE

## 2017-02-27 RX ORDER — WARFARIN SODIUM 5 MG/1
TABLET ORAL
Qty: 65 TABLET | Refills: 11 | COMMUNITY
Start: 2017-02-27 | End: 2017-03-29

## 2017-02-27 NOTE — MR AVS SNAPSHOT
Bia S Fly   2/27/2017   Anticoagulation Therapy Visit    Description:  51 year old female   Provider:  Garima Sorto, RN   Department:  [facility]           INR as of 2/27/2017     Today's INR 1.94!      Anticoagulation Summary as of 2/27/2017     INR goal 2.0-3.0   Today's INR 1.94!   Full instructions 15 mg on Mon, Fri; 10 mg all other days   Next INR check 3/15/2017    Indications   Long term current use of anticoagulant therapy [Z79.01]  DVT (deep venous thrombosis) (H) [I82.409]  PVD (peripheral vascular disease) with claudication (H) [I73.9]         Description     Recheck INR 2 weeks, 3/15/17 when at [place] for another appt.  Lab appt made for pt  increase warfarin to 15 mg Mon, Fri, 10 mg rest of week (7% increase)      February 2017 Details    Sun Mon Tue Wed Thu Fri Sat        1               2               3               4                 5               6               7               8               9               10               11                 12               13               14               15               16               17               18                 19               20               21               22               23               24               25                 26               27      15 mg   See details      28      10 mg              Date Details   02/27 This INR check               How to take your warfarin dose     To take:  10 mg Take 2 of the 5 mg tablets.    To take:  15 mg Take 3 of the 5 mg tablets.           March 2017 Details    Sun Mon Tue Wed Thu Fri Sat        1      10 mg         2      10 mg         3      15 mg         4      10 mg           5      10 mg         6      15 mg         7      10 mg         8      10 mg         9      10 mg         10      15 mg         11      10 mg           12      10 mg         13      15 mg         14      10 mg         15            16               17               18                 19                20               21               22               23               24               25                 26               27               28               29               30               31                 Date Details   No additional details    Date of next INR:  3/15/2017         How to take your warfarin dose     To take:  10 mg Take 2 of the 5 mg tablets.    To take:  15 mg Take 3 of the 5 mg tablets.

## 2017-02-27 NOTE — TELEPHONE ENCOUNTER
Citrus Calcium + D 315-250 tabs      Last Written Prescription Date:  10/26/2016  Last Fill Quantity: 60,   # refills: 5  Last Office Visit with [facility], [facility] or [facility] prescribing provider: 02/22/2017  Future Office visit:    Next 5 appointments (look out 90 days)     Mar 01, 2017  9:30 AM CST   Return Visit with [facility] ([facility])    [address] [address]   [phone]            Mar 15, 2017  9:30 AM CDT   Return Visit with [facility] ([facility])    [address] [address]   [phone]            Mar 24, 2017  2:30 PM CDT   Return Visit with Joshua Watts MD   [facility] ([facility])    [facility]  [address] [place] [address]   [phone]                   Routing refill request to provider for review/approval because:  Drug not active on patient's medication list    Della Alexandra CPhT  [facility]  Float Technician  [place]

## 2017-02-27 NOTE — PROGRESS NOTES
ANTICOAGULATION FOLLOW-UP CLINIC VISIT    Patient Name:  Bia Bill  Date:  2/27/2017  Contact Type:  Telephone/ INR resulted from a lab draw today.  Pt called and given new dosing and recheck instructions.  Pt wrote down information and verbalized understanding.    SUBJECTIVE:     Patient Findings     Positives Change in diet/appetite (states tobacco and diet consistent), Missed doses (states no missed doses)    Comments Change in medications - pt is taking hydrocodone for pain             OBJECTIVE    INR   Date Value Ref Range Status   02/27/2017 1.94 (H) 0.86 - 1.14 Final     Comment:     Special tube used to correct for high hematocrit       ASSESSMENT / PLAN  INR assessment THER    Recheck INR In: 2 WEEKS    INR Location Clinic      Anticoagulation Summary as of 2/27/2017     INR goal 2.0-3.0   Today's INR 1.94!   Maintenance plan 15 mg (5 mg x 3) on Mon, Fri; 10 mg (5 mg x 2) all other days   Full instructions 15 mg on Mon, Fri; 10 mg all other days   Weekly total 80 mg   Plan last modified Garima Sorto RN (2/27/2017)   Next INR check 3/15/2017   Priority INR   Target end date Indefinite    Indications   Long term current use of anticoagulant therapy [Z79.01]  DVT (deep venous thrombosis) (H) [I82.409]  PVD (peripheral vascular disease) with claudication (H) [I73.9]         Anticoagulation Episode Summary     INR check location     Preferred lab     Send INR reminders to [place] PHONE [facility] POOL    Comments * lab draw due to elevated hematocrit (fingerstick meters don't work). LEFT LE. STENT x 3 LEFT UPPER LEG. Previous arterial clot. Bridging instructions for 2/7/17 surgery in [facility] note from 1/19/17.  2/27/17 CURRENT PHONE # [phone]      Anticoagulation Care Providers     Provider Role Specialty Phone number    Kd Leong MD [facility] [phone]            See the Encounter Report to view Anticoagulation Flowsheet and Dosing Calendar (Go to Encounters tab in chart  review, and find the Anticoagulation Therapy Visit)        Garima Sorto RN

## 2017-02-28 NOTE — TELEPHONE ENCOUNTER
simvastatin (ZOCOR) 20 MG tablet     Last Written Prescription Date: 1/26/17  Last Fill Quantity: 30, # refills: 0  Last Office Visit with G, P or [facility] prescribing provider: 2/1/17  Next 5 appointments (look out 90 days)     Mar 01, 2017  9:30 AM CST   Return Visit with [facility] ([facility])    [address] [address]   [phone]            Mar 15, 2017  9:30 AM CDT   Return Visit with [facility] ([facility])    [address] [address]   [phone]            Mar 24, 2017  2:30 PM CDT   Return Visit with Joshua Watts MD   [facility] ([facility])    [facility]  [address]  [facility] [address]   [phone]                   Lab Results   Component Value Date    CHOL 138 10/26/2016     Lab Results   Component Value Date    HDL 34 10/26/2016     Lab Results   Component Value Date    LDL 78 10/26/2016     Lab Results   Component Value Date    TRIG 129 10/26/2016     Lab Results   Component Value Date    CHOLHDLRATIO 5.0 09/10/2015

## 2017-03-01 ENCOUNTER — OFFICE VISIT (OUTPATIENT)
Dept: PSYCHOLOGY | Facility: CLINIC | Age: 52
End: 2017-03-01
Payer: COMMERCIAL

## 2017-03-01 DIAGNOSIS — F31.62 MODERATE MIXED BIPOLAR I DISORDER (H): Primary | ICD-10-CM

## 2017-03-01 PROCEDURE — 90834 PSYTX W PT 45 MINUTES: CPT | Performed by: PSYCHOLOGIST

## 2017-03-01 NOTE — TELEPHONE ENCOUNTER
Routing refill request to provider for review/approval because:  [place] given x1 and patient did not follow up, please advise  Mundo Conde RN

## 2017-03-01 NOTE — MR AVS SNAPSHOT
MRN:[id number]                      After Visit Summary   3/1/2017    Bia Bill    MRN: [id number]           Visit Information        Provider Department      3/1/2017 9:30 AM Kd Morris [facility] Generic      Your next 10 appointments already scheduled     Mar 03, 2017  2:30 PM CST   Evaluation with Orquidea Orellana, PT   [facility] ([facility])    [address] [place] [address]   [phone]            Mar 06, 2017  1:00 PM CST   Oximetry  with SLEEP LAB, BED FIVE   [facility]    [address]  [facility] [address]   [phone]            Mar 07, 2017  1:00 PM CST   Oximetry Drop Off with SLEEP LAB, BED FIVE   [facility])    [address]  [facility] [address]   [phone]            Mar 15, 2017  9:30 AM CDT   Return Visit with Kd Morris   [facility] ([facility])    [address] [facility] [address]   [phone]            Mar 15, 2017 10:35 AM CDT   LAB with [facility] ([facility])    [address] [address]   [phone]           Patient must bring picture ID.  Patient should be prepared to give a urine specimen  Please do not eat 10-12 hours before your appointment if you are coming in fasting for labs on lipids, cholesterol, or glucose (sugar).  Pregnant women should follow their Care Team instructions. Water with medications is okay. Do not drink coffee or other fluids.   If you have concerns about taking  your medications, please ask at office or if scheduling via [employer], send a message by clicking on Secure Messaging, Message Your Care Team.            Mar 24, 2017  2:00 PM CDT   LAB with [facility] ([facility])    [address] [address]   [phone]           Patient must bring picture ID.  Patient should be prepared to give a urine specimen  Please do not eat 10-12 hours before your appointment if you are coming in fasting for labs on lipids, cholesterol, or glucose (sugar).  Pregnant women should follow their Care Team instructions. Water with medications is okay. Do not drink coffee or other fluids.   If you have concerns about taking  your medications, please ask at office or if scheduling via [employer], send a message by clicking on Secure Messaging, Message Your Care Team.            Mar 24, 2017  2:30 PM CDT   Return Visit with Joshua Watts MD   [facility] ([facility])    [facility]  [address]  [facility] [address]   [phone]            2017  8:30 AM CDT   Return Visit with Kd Morris   [facility] ([facility])    [address] [address]   [phone]              GoSave Information     GoSave lets you send messages to your doctor, view your test results, renew your prescriptions, schedule appointments and more. To sign up, go to [email or web address] . Click on \"Log in\" on the left side of the screen, which will take you to the Welcome page. Then click on \"Sign up Now\" on the right side of the page.     You will be asked to enter the access code listed below, as well as some personal information. Please follow the directions to create your username and password.     Your access code is: [email or web address]  Expires: 3/29/2017  2:13 PM     Your access code will  in 90 days. If you need help or a new code, please call your [place] clinic or [phone].        Care EveryWhere ID     This is your Care EveryWhere ID. This could be used by other organizations to access your [place] medical records  [id number]        "

## 2017-03-01 NOTE — PROGRESS NOTES
Progress Note  Disclaimer: This note consists of symbols derived from keyboarding, dictation and/or voice recognition software. As a result, there may be errors in the script that have gone undetected. Please consider this when interpreting information found in this chart.    Client Name: Bia Bill  Date: 3/1/2017         Service Type: Individual      Session Start Time: 9:30 AM  Session End Time: 10:15 AM      Session Length: 45     Session #: 8     Attendees: Client attended alone    Treatment Plan Last Reviewed: 3/1/2016  PHQ-9 / CHIN-7 : 12     DATA   Client reported her shoulder surgery went well. She is in considerable pain but is managing with a minimum of pain medications. She has had 2 nerve blocks in the shoulder and these have been of only marginal effectiveness. She is keeping herself busy doing puzzles, coloring, reading mysteries, and spending time with family and friends. She is making an effort to get out of the house regularly even if it is not for medical appointments.     Progress Since Last Session (Related to Symptoms / Goals / Homework):   Symptoms: Stable    Homework: Achieved / completed to satisfaction      Episode of Care Goals: Satisfactory progress - ACTION (Actively working towards change); Intervened by reinforcing change plan / affirming steps taken     Current / Ongoing Stressors and Concerns:   Recurrent depression  family relational problems,pain from recent shoulder surgery   Treatment Objective(s) Addressed in This Session:   Increase interest, engagement, and pleasure in doing things  Decrease frequency and intensity of feeling down, depressed, hopeless       Intervention:   CBT: Behavioral activation increasefeelings of self efficacy, support for gains made. Use of distraction techniques and reviewed breathing techniques for pain management        ASSESSMENT: Current Emotional / Mental Status (status of significant  symptoms):   Risk status (Self / Other harm or suicidal ideation)   Client denies current fears or concerns for personal safety.   Client denies current or recent suicidal ideation or behaviors.   Client denies current or recent homicidal ideation or behaviors.   Client denies current or recent self injurious behavior or ideation.   Client denies other safety concerns.   A safety and risk management plan has not been developed at this time, however client was given the after-hours number / 911 should there be a change in any of these risk factors.     Appearance:   Appropriate    Eye Contact:   Good    Psychomotor Behavior: Normal    Attitude:   Cooperative    Orientation:   All   Speech    Rate / Production: Normal     Volume:  Normal    Mood:    Normal   Affect:    Appropriate    Thought Content:  Clear    Thought Form:  Coherent  Logical    Insight:    Good      Medication Review:   No changes to current psychiatric medication(s)     Medication Compliance:   Yes     Changes in Health Issues:   None reported     Chemical Use Review:   Substance Use: Chemical use reviewed, no active concerns identified      Tobacco Use: No current tobacco use.       Collateral Reports Completed:   Not Applicable    PLAN: (Client Tasks / Therapist Tasks / Other)  Client to continue sticking to her boundaries with children and other family members. Client to continue to get out of the house at least 3 times per week, remember her self-care plan. Client to practice diaphragmatic breathing on a regular basis especially when pain flares.        Kd Morris                                                         ________________________________________________________________________    Treatment Plan    Client's Name: Bia Bill  YOB: 1965    Date: 3/1/2017      DSM5 Diagnoses: (Sustained by DSM5 Criteria Listed Above)  Diagnoses: 296.40 Bipolar I Disorder Current or Most Recent Episode Manic,  Unspecified  Psychosocial & Contextual Factors: financial difficulties, chronic pain, roommate issues  WHODAS 2.0 (12 item)               This questionnaire asks about difficulties due to health conditions. Health conditions             include disease or illnesses, other health problems that may be short or long lasting,             injuries, mental health or emotional problems, and problems with alcohol or drugs.                         Think back over the past 30 days and answer these questions, thinking about how much             difficulty you had doing the following activities. For each question, please [place] only             one response.      S1  Standing for long periods such as 30 minutes?  Mild =           2    S2  Taking care of household responsibilities?  Moderate =   3    S3  Learning a new task, for example, learning how to get to a new place?  Mild =           2    S4  How much of a problem do you have joining community activities (for example, festivals, Jain or other activities) in the same way as anyone else can?  Moderate =   3    S5  How much have you been emotionally affected by your health problems?  Mild =           2        In the past 30 days, how much difficulty did you have in:    S6  Concentrating on doing something for ten minutes?  Mild =           2    S7  Walking a long distance such as a kilometer (or equivalent)?  Severe =       4    S8  Washing your whole body?  None =         1    S9  Getting dressed?  None =         1    S10  Dealing with people you do not know?  Moderate =   3    S11  Maintaining a friendship?  Severe =       4    S12  Your day to day work?  Moderate =   3       H1  Overall, in the past 30 days, how many days were these difficulties present?  Record number of days seven    H2  In the past 30 days, for how many days were you totally unable to carry out your usual activities or work because of any health condition?  Record number of days  seven    H3  In  the past 30 days, not counting the days that you were totally unable, for how many days did you cut back or reduce your usual activities or work because of any health condition?  Record number of days five                   Referral / Collaboration:  Referral to another professional/service is not indicated at this time..    Anticipated number of session or this episode of care: 15    Goals  1. Education- the Biopsychosocial model of depression  a. Client will be able to describe how depression is effecting them physically, emotionally and socially  2. Behavioral Activation  a. Client will learn to assess their depression on a day to day basis  b. Client will Identify two forms of exercise/activity and engage in them 3 times per week  c. Client will Identify 3 things that make them laugh, and engage in these 5 times per week.  d. Client will Identify 1-2Creative activities or hobbies  and engage in them 2 times per week  e. Client will identify music, movies, books that make them feel good and use them 3-4 times per week  3. Self-care  a. Client will identify 5 things they can do just for themselves  b. Client will take time for quiet, reflection, meditation 5 times per week  c. Client will Learn to set boundaries when appropriate  d. Client will Identify 2 individuals they can call on for support, distraction  4. Assessment of progress  a. Client will engage in assessment of their progress on a regular basis    Bipolar Disorder  Treatment plan:  1. Education- the Biopsychosocial model of Bipolar Disorder    a. Client will be able to describe in general terms what Bipolar Disorder  is and is not  b. Client will be able to describe how Bipolar Disorder  has affected their life in at least two different areas, such as school or work and Home/relationships  c. Clients parents/guardians or significant others will be provided information on what Bipolar Disorder  is and the ways it can affect relationships and be  encouraged to be a part of clients treatment team.  2. Medication  a. Client will participate in medication evaluation for Bipolar Disorder  symptoms and follow medication recommendations.   3. Identification and management of triggers  a. Client and therapist will examine patient s history to determine if there are predictable triggers for manic or depressive episodes (e.g. boredom, anger, family stress)  b. Client and therapist will develop means of diffusing these triggers (e.g. relaxation strategies, boundary setting, anger management)  4. Comorbid conditions   a. Client and therapist will asses for comorbid conditions (e.g. anxiety, depression, substance use). and add additional items to treatment plan as needed  5. Self-care  a. Client will identify 3 things they can do just for themselves  b. Client will take time for quiet, reflection, meditation 3 times per week  c. Client will Learn to set boundaries when appropriate  d. Client will Identify 2 individuals they can call on for support, distraction  5. Behavioral Activation  a. Client will Identify two forms of exercise/activity and engage in them 3 times per week  b. Client will Identify 2 things that make them laugh, and engage in these 3 times per week.  c. Client will Identify 2 Creative activities or hobbies  and engage in them 2 times per week  d. Client will identify music, movies, books that make them feel good and use them 3 times per week  6. Assessment of progress  a. Client will engage in assessment of their progress on a regular basis    Client has reviewed and agreed to the above plan.      Kd Morris  3/1/2017

## 2017-03-03 ENCOUNTER — HOSPITAL ENCOUNTER (OUTPATIENT)
Dept: PHYSICAL THERAPY | Facility: CLINIC | Age: 52
Setting detail: THERAPIES SERIES
End: 2017-03-03
Attending: PHYSICIAN ASSISTANT
Payer: COMMERCIAL

## 2017-03-03 PROCEDURE — 40000718 ZZHC STATISTIC PT DEPARTMENT ORTHO VISIT: Performed by: PHYSICAL THERAPIST

## 2017-03-03 PROCEDURE — 97161 PT EVAL LOW COMPLEX 20 MIN: CPT | Mod: GP | Performed by: PHYSICAL THERAPIST

## 2017-03-03 PROCEDURE — 97110 THERAPEUTIC EXERCISES: CPT | Mod: GP | Performed by: PHYSICAL THERAPIST

## 2017-03-03 NOTE — PROGRESS NOTES
" 03/03/17 1400   General Information   Type of Visit Initial OP Ortho PT Evaluation   Start of Care Date 03/03/17   Referring Physician KAILEE Franklin    Orders Evaluate and Treat   Orders Comment \"Special Instructions/Modalities: right shoulder arthroscopy on 2/7/17; Special Programs: per protocol. Rotator cuff debridement (no repair). Proximal biceps tenodesis\"   Date of Order 02/22/17   Insurance Type Other   Insurance Comments/Visits Authorized [employer] Auth required after eval   Medical Diagnosis s/p arthroscopy of shoulder; shoulder impingement, right    Surgical/Medical history reviewed Yes   Precautions/Limitations no known precautions/limitations   Weight-Bearing Status - RUE nonweight-bearing   General Information Comments PMHX/Co-morbidities: Mild persistent asthma; deep venous thrombosis; chondromalacia of patella; asymmetrical sensorineural hearing loss; developmental reading disorder; hypothyroidism; fatty liver; obstructive sleep apnea; restless legs syndrome; smoker; erythrocytosis; depressive personality disorder; moderate mixed bipolar I dorder; COPD: rosacea; degerative disc disease (cervical); stroke; somatization disorder; acute gouty arthritis; hypertension goal        Present No   Body Part(s)   Body Part(s) Shoulder   Presentation and Etiology   Pertinent history of current problem (include personal factors and/or comorbidities that impact the POC) Patient presents today s/p right rotator cuff debridement and biceps tenodesis performed 2/7/17.      Patient reports: this all started from a fall now 3 years ago.  Landed on the right shoulder.  Fall was due to lightheadedness.  Initially tried physical therapy and cortisone injections, however nothing helped.  Just had surgery in which they scraped bone spurs and moved the biceps muscle.  This bicep is causing the most pain.     Symptom Location Global right shoulder; pain radiates into all 5 fingers   How/Where did it " occur With a fall   Onset date of current episode/exacerbation 02/07/17   Chronicity New   Pain rating (0-10 point scale) Best (/10);Worst (/10)   Best (/10) 6   Worst (/10) 10   Pain quality E. Shooting;F. Stabbing   Frequency of pain/symptoms C. With activity   Pain/symptoms are: Worse in the morning  (Stiffens up at night due to sleeping with sling)   Pain/symptoms exacerbated by C. Lifting;D. Carrying;E. Rest;G. Certain positions   Pain/symptoms eased by H. Cold;G. Heat   Progression of symptoms since onset: Unchanged   Current / Previous Interventions   Diagnostic Tests: X-ray   X-ray Results Results   X-ray results 1/12/17 Right shoulder radiograph: IMPRESSION: Moderate acromioclavicular degenerative changes. Glenohumeral joint is unremarkable. No acute fracture or subluxation.  No significant change.   MRI Results Results   MRI results 2/25/16 MR right shoulder: IMPRESSION: 1. There is a curved type II acromion. No subacromial spur. Moderate AC degenerative changes mildly narrow the supraspinatus outlet. There is moderate bone marrow edema adjacent to the AC joint indicating an active or ongoing arthrosis. 2. No evidence of rotator cuff tear. 3. Probable sub-labral foramen superior quadrant of the anterior labrum. No definite labral tear. 4. Patchy decreased marrow signal intensity on T1 images and increased bone marrow signal intensity on T2-weighted images. This most likely is related to red marrow reconversion. Cannot exclude marrow disorder, but I think less likely. Recommend clinical correlation and consideration of laboratory analysis   Prior Level of Function   Prior Level of Function-Mobility Independent   Prior Level of Function-ADLs Independent   Current Level of Function   Current Community Support Family/friend caregiver   Living environment [facility]   Fall Risk Screen   Fall screen completed by PT   Per patient - Fall 2 or more times in past year? No   Per patient - Fall with injury in past  year? No   Is patient a fall risk? No   Functional Scales   Functional Scales Other   Other Scales  SPADI   Shoulder Objective Findings   Side (if bilateral, select both right and left) Right   Observation Left handed   Posture Arrives today with well fit sling R UE; reduced lumbar lordosis; elevated right shoulder   Right Shoulder Flexion PROM 74 deg empty end feel   Right Shoulder ER PROM 0 deg empty end feel   Right Shoulder IR PROM Forearm to stomach pain free   Right Shoulder Flexion Strength NT  due to pain   Right Shoulder Abduction Strength NT  due to pain   Right Shoulder ER Strength NT  due to pain   Right Shoulder IR Strength NT  due to pain   Palpation Hypertonic right biceps and upper trapezius   Right Lower Trapezius Strength Migel=3+/5 based on posture   Planned Therapy Interventions   Planned Therapy Interventions ADL retraining;joint mobilization;manual therapy;motor coordination training;neuromuscular re-education;ROM;strengthening;stretching   Planned Modality Interventions   Planned Modality Interventions Cryotherapy   Clinical Impression   Criteria for Skilled Therapeutic Interventions Met yes, treatment indicated   PT Diagnosis s/p right rotator cuff debridement and biceps tenodesis performed 2/7/17.    Influenced by the following impairments Pain; weakness; impaired ROM; impaired posture   Functional limitations due to impairments Pain and difficulty getting dressed, performing ADL's, sleeping, and driving   Clinical Presentation Stable/Uncomplicated   Clinical Presentation Rationale (+) motivation; previous success with PT  (-) chronicity; co-morbidities   Clinical Decision Making (Complexity) Low complexity   Therapy Frequency 2 times/Week   Predicted Duration of Therapy Intervention (days/wks) 12 weeks   Risk & Benefits of therapy have been explained Yes   Patient, Family & other staff in agreement with plan of care Yes   Clinical Impression Comments Norah is a pleasant 50 yo female who  presents today 3.5 weeks s/p right rotator cuff debridgement and biceps tenodesis.  She will benefit from skilled physical therapy to address impairments and functional limitations.   Education Assessment   Preferred Learning Style Listening;Demonstration;Pictures/video   Barriers to Learning No barriers   ORTHO GOALS   PT Ortho Eval Goals 1;2;3   Ortho Goal 1   Goal Description Following PT interventions, patient will have >=170 deg right GH flexion AROM to allow patient to reach top cupboard.   Target Date 04/28/17   Ortho Goal 2   Goal Description Following PT interventions, patient will have >=4/5 right GH ER strength to reduce impingement with overhead ADL's.   Target Date 05/26/17   Ortho Goal 3   Goal Description Following PT interventions, patient will be able to put on and off a sweatshirt with <3/10 right shoulder pain.   Date Met 04/14/17   Total Evaluation Time   Total Evaluation Time 15 min       Orquidea Orellana, PT, DPT  Doctor of Physical Therapy #[id number]  [facility]  [phone]  [email or web address]

## 2017-03-04 RX ORDER — SIMVASTATIN 20 MG
20 TABLET ORAL AT BEDTIME
Qty: 30 TABLET | Refills: 0 | Status: SHIPPED | OUTPATIENT
Start: 2017-03-04 | End: 2017-03-16

## 2017-03-06 ENCOUNTER — OFFICE VISIT (OUTPATIENT)
Dept: SLEEP MEDICINE | Facility: CLINIC | Age: 52
End: 2017-03-06
Payer: COMMERCIAL

## 2017-03-06 DIAGNOSIS — G47.33 OSA (OBSTRUCTIVE SLEEP APNEA): Primary | ICD-10-CM

## 2017-03-06 NOTE — MR AVS SNAPSHOT
After Visit Summary   3/6/2017    Bia Bill    MRN: [id number]           Patient Information     Date Of Birth          1965        Visit Information        Provider Department      3/6/2017 1:00 PM SLEEP LAB, BED FIVE [facility]        Today's Diagnoses     GILES (Obstructive Sleep Apnea)-Moderate (AHI 16)    -  1       Follow-ups after your visit        Your next 10 appointments already scheduled     Mar 07, 2017  1:00 PM CST   Oximetry Drop Off with SLEEP LAB, BED FIVE   [facility] ([facility])    [address]  [facility] [address]   [phone]            Mar 08, 2017  3:30 PM CST   Treatment with Orquidea Orellana PT   [facility] ([facility])    [address] [place] [address]   [phone]            Mar 09, 2017  1:00 PM CST   PAP SETUP REPLACEMENT with CL [facility] DME   [facility] ([facility])    [address]  [facility] [address]   [phone]            Mar 14, 2017 12:30 PM CDT   Treatment with Orquidea Orellana PT   [facility] ([facility])    [address] [place] [address]   [phone]            Mar 15, 2017  9:30 AM CDT   Return Visit with Kd Morris   [facility] ([facility])    [address] [facility] [address]   [phone]            Mar 15, 2017 10:35 AM CDT   LAB with [facility] ([facility])    [address] [facility] [address]   [phone]           Patient must bring picture ID.  Patient should be prepared to give a urine specimen  Please do not eat 10-12 hours before your appointment if you are coming in fasting for labs on lipids, cholesterol, or glucose (sugar).  Pregnant women should follow their Care Team instructions. Water with medications is okay. Do not drink  coffee or other fluids.   If you have concerns about taking  your medications, please ask at office or if scheduling via [employer], send a message by clicking on Secure Messaging, Message Your Care Team.            Mar 24, 2017  2:00 PM CDT   LAB with [facility] ([facility])    [address] [facility] [address]   [phone]           Patient must bring picture ID.  Patient should be prepared to give a urine specimen  Please do not eat 10-12 hours before your appointment if you are coming in fasting for labs on lipids, cholesterol, or glucose (sugar).  Pregnant women should follow their Care Team instructions. Water with medications is okay. Do not drink coffee or other fluids.   If you have concerns about taking  your medications, please ask at office or if scheduling via [employer], send a message by clicking on Secure Messaging, Message Your Care Team.            Mar 24, 2017  2:30 PM CDT   Return Visit with Joshua Watts MD   [facility] ([facility])    [facility]  [address] [facility] [address]  [facility] [address]   [phone]            Apr 03, 2017  9:30 AM CDT   Return Visit with Kd Morris   [facility] ([facility])    [address] [facility] [address]   [phone]              Who to contact     If you have questions or need follow up information about today's clinic visit or your schedule please contact [facility] directly at [phone].  Normal or non-critical lab and imaging results will be communicated to you by MyChart, letter or phone within 4 business days after the clinic has received the results. If you do not hear from us within 7 days, please contact the clinic through MyChart or phone. If you have a critical or abnormal lab result, we will notify you by phone as soon as possible.  Submit refill requests through MyChart or call your  "pharmacy and they will forward the refill request to us. Please allow 3 business days for your refill to be completed.          Additional Information About Your Visit        MyChart Information     [employer] lets you send messages to your doctor, view your test results, renew your prescriptions, schedule appointments and more. To sign up, go to [email or web address] . Click on \"Log in\" on the left side of the screen, which will take you to the Welcome page. Then click on \"Sign up Now\" on the right side of the page.     You will be asked to enter the access code listed below, as well as some personal information. Please follow the directions to create your username and password.     Your access code is: [email or web address]  Expires: 3/29/2017  2:13 PM     Your access code will  in 90 days. If you need help or a new code, please call your [place] clinic or [phone].        Care EveryWhere ID     This is your Care EveryWhere ID. This could be used by other organizations to access your [place] medical records  [id number]        Your Vitals Were     Last Period                   2009            Blood Pressure from Last 3 Encounters:   17 131/70   17 135/63   17 110/62    Weight from Last 3 Encounters:   17 101.3 kg (223 lb 6.4 oz)   17 102.3 kg (225 lb 9.6 oz)   17 101.9 kg (224 lb 9.6 oz)              We Performed the Following     Overnight oximetry study        Primary Care Provider Office Phone # Fax #    Kd Leong [phone]       [facility] [address] [facility] [address]        Thank you!     Thank you for choosing [facility]  for your care. Our goal is always to provide you with excellent care. Hearing back from our patients is one way we can continue to improve our services. Please take a few minutes to complete the written survey that you may receive in the mail after your visit with us. Thank you!      "        Your Updated Medication List - Protect others around you: Learn how to safely use, store and throw away your medicines at www.disposemymeds.org.          This list is accurate as of: 3/6/17  1:20 PM.  Always use your most recent med list.                   Brand Name Dispense Instructions for use    acetaminophen 500 MG tablet    TYLENOL     Take 500-1,000 mg by mouth every 6 hours as needed for mild pain       * albuterol (2.5 MG/3ML) 0.083% neb solution     1 Box    Take 1 vial (2.5 mg) by nebulization every 6 hours as needed for shortness of breath / dyspnea or wheezing       * albuterol 108 (90 BASE) MCG/ACT Inhaler    PROAIR HFA/PROVENTIL HFA/VENTOLIN HFA    3 Inhaler    Inhale 2 puffs into the lungs every 6 hours as needed for shortness of breath / dyspnea or wheezing       allopurinol 100 MG tablet    ZYLOPRIM    90 tablet    Take 1 tablet (100 mg) by mouth daily       ARIPiprazole 5 MG tablet    ABILIFY    45 tablet    Take 1.5 tablets (7.5 mg) by mouth At Bedtime       * ASPIRIN NOT PRESCRIBED    INTENTIONAL     by Other route continuous prn.       buPROPion 150 MG 24 hr tablet    WELLBUTRIN XL    90 tablet    Take 1 tablet (150 mg) by mouth every morning       calcium citrate-vitamin D 315-250 MG-UNIT Tabs per tablet    CITRUS CALCIUM +D    120 tablet    Take 2 tablets by mouth daily       cholecalciferol 1000 UNIT tablet    vitamin D     Take 1 tablet by mouth daily       EPINEPHrine 0.3 MG/0.3ML injection     0.6 mL    Inject 0.3 mLs (0.3 mg) into the muscle once as needed for anaphylaxis       furosemide 20 MG tablet    LASIX    180 tablet    Take 1 tablet (20 mg) by mouth 2 times daily       gabapentin 600 MG tablet    NEURONTIN    90 tablet    Take 1 tablet (600 mg) by mouth 3 times daily       HYDROcodone-acetaminophen 5-325 MG per tablet    NORCO    60 tablet    Take 1-2 tablets by mouth every 8 hours as needed for moderate to severe pain maximum 6 tablet(s) per day       ipratropium -  "albuterol 0.5 mg/2.5 mg/3 mL 0.5-2.5 (3) MG/3ML neb solution    DUONEB    30 vial    Take 1 vial (3 mLs) by nebulization every 6 hours as needed for shortness of breath / dyspnea or wheezing USE THIS INSTEAD OF ALBUTEROL INHALER AS NEEDED FOR WHEEZE COUGH OR SHORTNESS OF BREATH       lamoTRIgine 200 MG tablet    LaMICtal    60 tablet    Take 1 tablet (200 mg) by mouth daily       levothyroxine 150 MCG tablet    SYNTHROID/LEVOTHROID    30 tablet    Take 1 tablet (150 mcg) by mouth daily       mometasone-formoterol 100-5 MCG/ACT oral inhaler    DULERA    13 g    Inhale 2 puffs into the lungs 2 times daily       omeprazole 20 MG CR capsule    priLOSEC    180 capsule    Take 1 capsule (20 mg) by mouth 2 times daily       * order for DME     1 Units    Equipment being ordered: CPAP       * order for DME     3 Month    Equipment being ordered: CPAP supplies needed       order for DME     1 Month    Equipment being ordered: INCONTINENCE PADS  QID       * order for DME     60 Month    Equipment being ordered: DEPENDS SIZE LARGE       * order for DME     2 Device    Equipment being ordered:   \"[id number]\" x2pair  \"20-30mmHg Farrow Hybrid Liners\" x1pair       * order for DME     2 Units    Equipment being ordered: [id number]\" x2pair   \"20-30mmHg F[place] Hybrid Liners\" x 2 pair       senna-docusate 8.6-50 MG per tablet    SENOKOT-S;PERICOLACE         simvastatin 20 MG tablet    ZOCOR    30 tablet    Take 1 tablet (20 mg) by mouth At Bedtime       traZODone 150 MG tablet    DESYREL    90 tablet    Take 1 tablet (150 mg) by mouth At Bedtime       warfarin 5 MG tablet    COUMADIN    65 tablet    as directed by Anticoagulation Clinic, current dose 15 mg Mon, Fri, 10 mg rest of week       * Notice:  This list has 8 medication(s) that are the same as other medications prescribed for you. Read the directions carefully, and ask your doctor or other care provider to review them with you.      "

## 2017-03-06 NOTE — PROGRESS NOTES
SUBJECTIVE:  Chief Complaint   Patient presents with     Sleep Problem      overnight pulse oximetry         OBJECTIVE:  overnight pulse oximetry ordered.    Instructions were reviewed with Bia and were also printed for Bia to take with her.   Bia verbalized understanding and demonstrated use very well.     ASSESSMENT/PLAN:  Bia received overnight pulse oximetry today March 6, 2017. Pt will use device and will return on 3/7/17

## 2017-03-07 ENCOUNTER — DOCUMENTATION ONLY (OUTPATIENT)
Dept: SLEEP MEDICINE | Facility: CLINIC | Age: 52
End: 2017-03-07

## 2017-03-07 PROCEDURE — 94762 N-INVAS EAR/PLS OXIMTRY CONT: CPT

## 2017-03-07 NOTE — PROGRESS NOTES
SUBJECTIVE:  Chief Complaint   Patient presents with     Sleep Problem     drop off overnight oximetry       OBJECTIVE:  overnight pulse oximetry returned to clinic today March 7, 2017.    ASSESSMENT/PLAN:  Results scanned and routed to provider for review

## 2017-03-08 ENCOUNTER — HOSPITAL ENCOUNTER (OUTPATIENT)
Dept: PHYSICAL THERAPY | Facility: CLINIC | Age: 52
Setting detail: THERAPIES SERIES
End: 2017-03-08
Attending: PHYSICIAN ASSISTANT
Payer: COMMERCIAL

## 2017-03-08 PROCEDURE — 40000718 ZZHC STATISTIC PT DEPARTMENT ORTHO VISIT: Performed by: PHYSICAL THERAPIST

## 2017-03-08 PROCEDURE — 97110 THERAPEUTIC EXERCISES: CPT | Mod: GP | Performed by: PHYSICAL THERAPIST

## 2017-03-09 ENCOUNTER — DOCUMENTATION ONLY (OUTPATIENT)
Dept: SLEEP MEDICINE | Facility: CLINIC | Age: 52
End: 2017-03-09

## 2017-03-09 ENCOUNTER — TELEPHONE (OUTPATIENT)
Dept: FAMILY MEDICINE | Facility: CLINIC | Age: 52
End: 2017-03-09

## 2017-03-09 DIAGNOSIS — G47.33 OSA (OBSTRUCTIVE SLEEP APNEA): Primary | ICD-10-CM

## 2017-03-09 NOTE — PROCEDURES
Documenting results of home overnight pulse oximetry performed night of 3/7/2017 on auto-titrate 5-20 cm H2O.    Total recording time 5:57:00 with valid time of 5:57:00.  Lowest pulse rate ~60 with average pulse rate of 66.5.  Lowest SpO2 ~90%, mean / baseline SpO2 92.9%.  SpO2 <= 88% for 0 minutes.    A/P:   - Normal and reassuring overnight oximetry on CPAP   - No changes recommended at this time

## 2017-03-09 NOTE — PROGRESS NOTES
Patient was offered choice of vendor and chose [facility].  Patient Bia Bill was set up at [facility]  on March 9, 2017. Patient received a Resmed AirSense 10 Auto. Pressures were set at 5-18 cm H2O.   Patient s ramp is 0 cm H2O for Off and FLEX/EPR is 3. heated tubing and heated humidifier.  Patient is enrolled in the STM Program and does need to meet compliance. Patient has a follow up on 4/27/2017 with Dr. aBzzi.    Christiane Parry

## 2017-03-10 ENCOUNTER — RADIANT APPOINTMENT (OUTPATIENT)
Dept: GENERAL RADIOLOGY | Facility: CLINIC | Age: 52
End: 2017-03-10
Attending: PHYSICIAN ASSISTANT
Payer: COMMERCIAL

## 2017-03-10 ENCOUNTER — OFFICE VISIT (OUTPATIENT)
Dept: FAMILY MEDICINE | Facility: CLINIC | Age: 52
End: 2017-03-10
Payer: COMMERCIAL

## 2017-03-10 VITALS
HEART RATE: 60 BPM | TEMPERATURE: 97.4 F | BODY MASS INDEX: 45.77 KG/M2 | SYSTOLIC BLOOD PRESSURE: 114 MMHG | WEIGHT: 226.6 LBS | DIASTOLIC BLOOD PRESSURE: 64 MMHG

## 2017-03-10 DIAGNOSIS — M25.511 ACUTE PAIN OF RIGHT SHOULDER: ICD-10-CM

## 2017-03-10 DIAGNOSIS — M25.511 ACUTE PAIN OF RIGHT SHOULDER: Primary | ICD-10-CM

## 2017-03-10 DIAGNOSIS — Z12.31 ENCOUNTER FOR SCREENING MAMMOGRAM FOR BREAST CANCER: ICD-10-CM

## 2017-03-10 PROCEDURE — 73030 X-RAY EXAM OF SHOULDER: CPT | Mod: RT

## 2017-03-10 PROCEDURE — 99213 OFFICE O/P EST LOW 20 MIN: CPT | Performed by: PHYSICIAN ASSISTANT

## 2017-03-10 RX ORDER — METHOCARBAMOL 500 MG/1
500 TABLET, FILM COATED ORAL
COMMUNITY
Start: 2017-02-07 | End: 2017-07-20

## 2017-03-10 RX ORDER — KETOROLAC TROMETHAMINE 10 MG/1
10 TABLET, FILM COATED ORAL EVERY 6 HOURS PRN
Qty: 20 TABLET | Refills: 0 | Status: SHIPPED | OUTPATIENT
Start: 2017-03-10 | End: 2017-07-20

## 2017-03-10 ASSESSMENT — ENCOUNTER SYMPTOMS
CONSTIPATION: 0
FEVER: 0
NECK PAIN: 0
DIARRHEA: 0
SHORTNESS OF BREATH: 0
PHOTOPHOBIA: 0
BLURRED VISION: 0
DYSURIA: 0
BACK PAIN: 0
SPUTUM PRODUCTION: 0
FOCAL WEAKNESS: 0
VOMITING: 0
SENSORY CHANGE: 1
DOUBLE VISION: 0
NAUSEA: 0
ABDOMINAL PAIN: 0
PALPITATIONS: 0
CHILLS: 0
MYALGIAS: 0
HEARTBURN: 0
COUGH: 0
TINGLING: 1
HEADACHES: 0
DIZZINESS: 0
EYE PAIN: 0
WEIGHT LOSS: 0
WHEEZING: 0
TREMORS: 0
SORE THROAT: 0
FREQUENCY: 0
FALLS: 0

## 2017-03-10 ASSESSMENT — PAIN SCALES - GENERAL: PAINLEVEL: EXTREME PAIN (9)

## 2017-03-10 NOTE — PROGRESS NOTES
"HPI    SUBJECTIVE:                                                    Bia Bill is a 51 year old female who presents to clinic today for right shoulder pain the past three days. She heard a loud pop after minimal shoulder movement three days ago and yesterday causing severe sharp pain in her right shoulder. Pain 9/10, persistent with slight numbeness and tingling down arm into fingers. Arthritis scrapping and bone shaving performed on right shoulder at [facility] by Dr. Rice on 02/07/2017. She states everything looked good at her follow-up shortly surgery. Her next appointment is on 03/20. She endorses dull pain since surgery that is aggravated with movement, but pain much worse since \"popping\" incident. She has been taking one hydrocodone in morning and two in the evening with pain relief but just ran out. Unable to keep arm out of sling as recommended by the surgeon. Physical therapy massage helped with muscle stiffness, but they are unwilling to continue treatment until x-ray done due to recent symptoms.      Musculoskeletal problem/pain      Duration: Since Tuesday     Description  Location: Patient states that she had surgery on 2/7/17. On Tuesday she says that she heard a poping noise. Then it popped again on Thursday. Says she went to her physical therapy appointment and they did not want to do anything until she was seen and possibly had a x-ray    Intensity:  severe    Accompanying signs and symptoms: none    History  Previous similar problem: no   Previous evaluation:  none    Precipitating or alleviating factors:  Trauma or overuse: YES  Aggravating factors include: Movement     Therapies tried and outcome: Hydrocodone, Ice and Heat     Problem list and histories reviewed & adjusted, as indicated.  Additional history: as documented    Patient Active Problem List   Diagnosis     Mild persistent asthma     Polyneuropathy in other diseases classified elsewhere (H)     DVT (deep venous " thrombosis) (H)     Alcohol abuse, in remission     SECONDARY POLYCYTHEMIA     Chondromalacia of patella     Sensorineural hearing loss, asymmetrical     Developmental reading disorder     Esophageal reflux     Hypothyroidism     Fatty liver     GILES (Obstructive Sleep Apnea)-Moderate (AHI 16)     RLS (restless legs syndrome)     CARDIOVASCULAR SCREENING; LDL GOAL LESS THAN 100     Smoker     Erythrocytosis     Moderate mixed bipolar I disorder (H)     Personality disorder, depressive     COPD (chronic obstructive pulmonary disease) (H)     Current use of long term anticoagulation     Rosacea     Health Care Home     DDD (degenerative disc disease), cervical     Benign neoplasm of colon (POLYPOSIS)     Stroke (H)     Somatization disorder     Acute gouty arthritis     Sebaceous cyst of right axilla     HTN, goal below 140/90     Left leg pain     Right shoulder pain     Headache     Vitamin D deficiency     Long term current use of anticoagulant therapy     Morbid obesity (H)     PVD (peripheral vascular disease) with claudication (H)     Trochanteric bursitis of both hips     Cyst of left ovary     Acute bronchospasm     Morbid obesity with alveolar hypoventilation (H)     Past Surgical History   Procedure Laterality Date     Lithotripsy  2004     Lithotrypsy     Biliary stent single use cov       3 stints in left leg     D & c  10/26/09     with uterine ablation     Hysterectomy, pap no longer indicated  1-4-2010     Esophagoscopy, gastroscopy, duodenoscopy (egd), combined  4/21/2014     Procedure: Gastroscopy;  Surgeon: Moris Thomas MD;  Location: [facility]     Bone marrow biopsy, bone specimen, needle/trocar N/A 11/17/2014     Procedure: BIOPSY BONE MARROW;  Surgeon: Hay Aaron MD;  Location: [facility]       Social History   Substance Use Topics     Smoking status: Current Every Day Smoker     Packs/day: 0.50     Years: 34.00     Types: Cigarettes     Smokeless tobacco: Never Used      Comment:  started at age 11.  Quit during pregnancyX4.  0.5 ppd (6/2016)     Alcohol use No      Comment: quit 1995     Family History   Problem Relation Age of Onset     Hypertension Mother      DIABETES Mother      Blood Disease Mother      HEART DISEASE Mother      chf     CEREBROVASCULAR DISEASE Mother      Hypertension Father      CANCER Father      lung cancer     Allergies Sister      Depression Sister      Hypertension Sister          Current Outpatient Prescriptions   Medication Sig Dispense Refill     methocarbamol (ROBAXIN) 500 MG tablet Take 500 mg by mouth       ketorolac (TORADOL) 10 MG tablet Take 1 tablet (10 mg) by mouth every 6 hours as needed 20 tablet 0     order for DME Equipment being ordered: CPAP  AIRSENSE 10  5-18 CM H20  SN# [id number]   DN# [id number]       simvastatin (ZOCOR) 20 MG tablet Take 1 tablet (20 mg) by mouth At Bedtime 30 tablet 0     calcium citrate-vitamin D (CITRUS CALCIUM +D) 315-250 MG-UNIT TABS per tablet Take 2 tablets by mouth daily 120 tablet 3     warfarin (COUMADIN) 5 MG tablet as directed by Anticoagulation Clinic, current dose 15 mg Mon, Fri, 10 mg rest of week 65 tablet 11     senna-docusate (SENOKOT-S;PERICOLACE) 8.6-50 MG per tablet        HYDROcodone-acetaminophen (NORCO) 5-325 MG per tablet Take 1-2 tablets by mouth every 8 hours as needed for moderate to severe pain maximum 6 tablet(s) per day 60 tablet 0     gabapentin (NEURONTIN) 600 MG tablet Take 1 tablet (600 mg) by mouth 3 times daily 90 tablet 1     ARIPiprazole (ABILIFY) 5 MG tablet Take 1.5 tablets (7.5 mg) by mouth At Bedtime 45 tablet 0     traZODone (DESYREL) 150 MG tablet Take 1 tablet (150 mg) by mouth At Bedtime 90 tablet 1     lamoTRIgine (LAMICTAL) 200 MG tablet Take 1 tablet (200 mg) by mouth daily 60 tablet 1     allopurinol (ZYLOPRIM) 100 MG tablet Take 1 tablet (100 mg) by mouth daily 90 tablet 2     VITAMIN D3 1000 UNITS tablet Take 1 tablet by mouth daily       furosemide (LASIX) 20 MG tablet Take 1  "tablet (20 mg) by mouth 2 times daily 180 tablet 2     buPROPion (WELLBUTRIN XL) 150 MG 24 hr tablet Take 1 tablet (150 mg) by mouth every morning 90 tablet 3     mometasone-formoterol (DULERA) 100-5 MCG/ACT oral inhaler Inhale 2 puffs into the lungs 2 times daily 13 g 1     order for DME Equipment being ordered: [id number]\" x2pair     \"20-30mmHg Farrow Hybrid Liners\" x 2 pair 2 Units 11     EPINEPHrine (EPIPEN) 0.3 MG/0.3ML injection Inject 0.3 mLs (0.3 mg) into the muscle once as needed for anaphylaxis 0.6 mL 0     ipratropium - albuterol 0.5 mg/2.5 mg/3 mL (DUONEB) 0.5-2.5 (3) MG/3ML nebulization Take 1 vial (3 mLs) by nebulization every 6 hours as needed for shortness of breath / dyspnea or wheezing USE THIS INSTEAD OF ALBUTEROL INHALER AS NEEDED FOR WHEEZE COUGH OR SHORTNESS OF BREATH 30 vial 1     omeprazole (PRILOSEC) 20 MG capsule Take 1 capsule (20 mg) by mouth 2 times daily 180 capsule 3     levothyroxine (SYNTHROID, LEVOTHROID) 150 MCG tablet Take 1 tablet (150 mcg) by mouth daily 30 tablet 10     albuterol (PROAIR HFA, PROVENTIL HFA, VENTOLIN HFA) 108 (90 BASE) MCG/ACT inhaler Inhale 2 puffs into the lungs every 6 hours as needed for shortness of breath / dyspnea or wheezing 3 Inhaler 1     order for DME Equipment being ordered: DEPENDS SIZE LARGE 60 Month 5     order for DME Equipment being ordered:     \"[id number]\" x2pair    \"20-30mmHg Farrow Hybrid Liners\" x1pair 2 Device 11     order for DME Equipment being ordered: INCONTINENCE PADS   QID 1 Month 11     order for DME Equipment being ordered: CPAP supplies needed 3 Month 3     albuterol (2.5 MG/3ML) 0.083% nebulizer solution Take 1 vial (2.5 mg) by nebulization every 6 hours as needed for shortness of breath / dyspnea or wheezing 1 Box 0     acetaminophen (TYLENOL) 500 MG tablet Take 500-1,000 mg by mouth every 6 hours as needed for mild pain       ORDER FOR DME Equipment being ordered: CPAP 1 Units 11     ASPIRIN NOT PRESCRIBED, INTENTIONAL, " by Other route continuous prn.  0     Allergies   Allergen Reactions     Percocet [Codeine] Anaphylaxis     Asa [Aspirin] Hives     Aspirin causes seizures and hives, throat swelling       Bee      Darvocet [Acetaminophen] Anaphylaxis     Darvocet,Percocet      Lyrica [Pregabalin] Swelling     dizziness     Percocet [Oxycodone-Acetaminophen] Swelling     Iodine Rash     Reaction to topical betadine     Tape [Adhesive Tape] Rash     BP Readings from Last 3 Encounters:   03/10/17 114/64   02/06/17 131/70   02/03/17 135/63    Wt Readings from Last 3 Encounters:   03/10/17 226 lb 9.6 oz (102.8 kg)   02/22/17 223 lb 6.4 oz (101.3 kg)   02/06/17 225 lb 9.6 oz (102.3 kg)            Labs reviewed in EPIC    Reviewed and updated as needed this visit by clinical staff  Tobacco  Allergies  Med Hx  Surg Hx  Fam Hx  Soc Hx      Reviewed and updated as needed this visit by Provider       Review of Systems   Constitutional: Negative for chills, fever, malaise/fatigue and weight loss.   HENT: Negative for congestion, ear pain and sore throat.    Eyes: Negative for blurred vision, double vision, photophobia and pain.   Respiratory: Negative for cough, sputum production, shortness of breath and wheezing.    Cardiovascular: Negative for chest pain and palpitations.   Gastrointestinal: Negative for abdominal pain, constipation, diarrhea, heartburn, nausea and vomiting.   Genitourinary: Negative for dysuria, frequency and urgency.   Musculoskeletal: Positive for joint pain. Negative for back pain, falls, myalgias and neck pain.   Skin: Negative for itching and rash.   Neurological: Positive for tingling and sensory change. Negative for dizziness, tremors, focal weakness and headaches.   Endo/Heme/Allergies: Negative.          Physical Exam   Constitutional: She is oriented to person, place, and time and well-developed, well-nourished, and in no distress. No distress.   HENT:   Head: Normocephalic and atraumatic.   Right Ear:  External ear normal.   Left Ear: External ear normal.   Nose: Nose normal.   Eyes: Conjunctivae and EOM are normal. Pupils are equal, round, and reactive to light. Right eye exhibits no discharge. Left eye exhibits no discharge. No scleral icterus.   Neck: Normal range of motion. Neck supple. No JVD present. No tracheal deviation present. No thyromegaly present.   Cardiovascular: Normal rate, regular rhythm, normal heart sounds and intact distal pulses.  Exam reveals no gallop and no friction rub.    No murmur heard.  Pulmonary/Chest: Effort normal and breath sounds normal. No stridor. No respiratory distress. She has no wheezes. She has no rales. She exhibits no tenderness.   Abdominal: Soft. Bowel sounds are normal. She exhibits no distension and no mass. There is no tenderness. There is no rebound and no guarding.   Musculoskeletal: She exhibits no edema.        Right shoulder: She exhibits decreased range of motion, tenderness, bony tenderness and pain. She exhibits no swelling, no effusion and no deformity.        Left shoulder: Normal.   Decreased strength and active/passive ROM in right shoulder due to pain. Normal sensation and pulse.    Lymphadenopathy:     She has no cervical adenopathy.   Neurological: She is alert and oriented to person, place, and time. She has normal reflexes. No cranial nerve deficit. She exhibits normal muscle tone. Gait normal.   Skin: Skin is warm and dry. No rash noted. She is not diaphoretic. No erythema. No pallor.   Psychiatric: Mood, memory, affect and judgment normal.     ((M25.356) Acute pain of right shoulder  (primary encounter diagnosis)  Comment:   Plan: ketorolac (TORADOL) 10 MG tablet, CANCELED: XR         Shoulder Right 2 Views            (Z12.31) Encounter for screening mammogram for breast cancer  Comment:   Plan: *MA Screening Digital Bilateral          X-ray normal. Given Toradol to help with pain and inflammation. Continue PT and encouraged to increase ROM  exercises as tolerated. Call surgeon to see if earlier follow-up necessary.             Abbie Epley, PA-S      I have examined this patient and reviewed above note  Ari Jiang MS, PA-C

## 2017-03-10 NOTE — MR AVS SNAPSHOT
After Visit Summary   3/10/2017    Bia Bill    MRN: [id number]           Patient Information     Date Of Birth          1965        Visit Information        Provider Department      3/10/2017 10:00 AM Ari Jiang PA-C [facility]        Today's Diagnoses     Acute pain of right shoulder    -  1    Encounter for screening mammogram for breast cancer           Follow-ups after your visit        Follow-up notes from your care team     Return if symptoms worsen or fail to improve.      Your next 10 appointments already scheduled     Mar 14, 2017 12:30 PM CDT   Treatment with Orquidea Orellana PT   [facility] ([facility])    [address] [place] [address]   [phone]            Mar 15, 2017  9:30 AM CDT   Return Visit with Kd Morris   [facility] ([facility])    [address] [facility] [address]   [phone]            Mar 15, 2017 10:35 AM CDT   LAB with [facility] ([facility])    [address] [facility] [address]   [phone]           Patient must bring picture ID.  Patient should be prepared to give a urine specimen  Please do not eat 10-12 hours before your appointment if you are coming in fasting for labs on lipids, cholesterol, or glucose (sugar).  Pregnant women should follow their Care Team instructions. Water with medications is okay. Do not drink coffee or other fluids.   If you have concerns about taking  your medications, please ask at office or if scheduling via [employer], send a message by clicking on Secure Messaging, Message Your Care Team.            Mar 16, 2017  3:00 PM CDT   SHORT with Ayanna Hoff PA-C   [facility] ([facility]    [address] [facility] [address]   [phone]            Mar 20, 2017  2:00 PM CDT   Return Visit with Patel Rice MD   [facility] [place] ([facility] [place])    [address]  [place] [address] [phone]            Mar 24, 2017  2:00 PM CDT   LAB with [facility] ([facility])    [address] [facility] [address]   [phone]           Patient must bring picture ID.  Patient should be prepared to give a urine specimen  Please do not eat 10-12 hours before your appointment if you are coming in fasting for labs on lipids, cholesterol, or glucose (sugar).  Pregnant women should follow their Care Team instructions. Water with medications is okay. Do not drink coffee or other fluids.   If you have concerns about taking  your medications, please ask at office or if scheduling via [employer], send a message by clicking on Secure Messaging, Message Your Care Team.            Mar 24, 2017  2:30 PM CDT   Return Visit with Joshua Watts MD   [facility] ([facility])    [facility]  [address] [address]  [facility] [address]   [phone]            Apr 03, 2017  9:30 AM CDT   Return Visit with Kd Morris   [facility] ([facility])    [address] [address]   [phone]            Apr 27, 2017  2:00 PM CDT   Return Sleep Patient with Patel Bazzi MD   [facility] ([facility])    [address]  [facility] [id number] [phone]              Future tests that were ordered for you today     Open Future Orders        Priority Expected Expires Ordered    *MA Screening Digital Bilateral Routine  3/10/2018 3/10/2017            Who to contact     If you have questions or need follow up information about today's clinic visit or your schedule please contact [facility] directly at [phone].  Normal or non-critical lab and imaging results will be communicated to you  "by [employer], letter or phone within 4 business days after the clinic has received the results. If you do not hear from us within 7 days, please contact the clinic through [employer] or phone. If you have a critical or abnormal lab result, we will notify you by phone as soon as possible.  Submit refill requests through [employer] or call your pharmacy and they will forward the refill request to us. Please allow 3 business days for your refill to be completed.          Additional Information About Your Visit        [facility] Hospital[facility] Information     [employer] lets you send messages to your doctor, view your test results, renew your prescriptions, schedule appointments and more. To sign up, go to [email or web address] . Click on \"Log in\" on the left side of the screen, which will take you to the Welcome page. Then click on \"Sign up Now\" on the right side of the page.     You will be asked to enter the access code listed below, as well as some personal information. Please follow the directions to create your username and password.     Your access code is: [email or web address]  Expires: 3/29/2017  2:13 PM     Your access code will  in 90 days. If you need help or a new code, please call your [place] clinic or [phone].        Care EveryWhere ID     This is your Care EveryWhere ID. This could be used by other organizations to access your [place] medical records  [id number]        Your Vitals Were     Pulse Temperature Last Period BMI (Body Mass Index)          60 97.4  F (36.3  C) (Tympanic) 2009 45.77 kg/m2         Blood Pressure from Last 3 Encounters:   03/10/17 114/64   17 131/70   17 135/63    Weight from Last 3 Encounters:   03/10/17 226 lb 9.6 oz (102.8 kg)   17 223 lb 6.4 oz (101.3 kg)   17 225 lb 9.6 oz (102.3 kg)                 Today's Medication Changes          These changes are accurate as of: 3/10/17 12:08 PM.  If you have any questions, ask your nurse or doctor.               Start taking " these medicines.        Dose/Directions    ketorolac 10 MG tablet   Commonly known as:  TORADOL   Used for:  Acute pain of right shoulder   Started by:  Ari Jiang PA-C        Dose:  10 mg   Take 1 tablet (10 mg) by mouth every 6 hours as needed   Quantity:  20 tablet   Refills:  0            Where to get your medicines      These medications were sent to [id number] - [place], [place] - [address]  [address] [facility] [address]    Hours:  Closed 10-16-08 business to [facility] Phone:  [phone]     ketorolac 10 MG tablet                Primary Care Provider Office Phone # Fax #    Kd Leong [phone]       [facility] [address] [facility] [address]        Thank you!     Thank you for choosing [facility]  for your care. Our goal is always to provide you with excellent care. Hearing back from our patients is one way we can continue to improve our services. Please take a few minutes to complete the written survey that you may receive in the mail after your visit with us. Thank you!             Your Updated Medication List - Protect others around you: Learn how to safely use, store and throw away your medicines at www.disposemymeds.org.          This list is accurate as of: 3/10/17 12:08 PM.  Always use your most recent med list.                   Brand Name Dispense Instructions for use    acetaminophen 500 MG tablet    TYLENOL     Take 500-1,000 mg by mouth every 6 hours as needed for mild pain       * albuterol (2.5 MG/3ML) 0.083% neb solution     1 Box    Take 1 vial (2.5 mg) by nebulization every 6 hours as needed for shortness of breath / dyspnea or wheezing       * albuterol 108 (90 BASE) MCG/ACT Inhaler    PROAIR HFA/PROVENTIL HFA/VENTOLIN HFA    3 Inhaler    Inhale 2 puffs into the lungs every 6 hours as needed for shortness of breath / dyspnea or wheezing       allopurinol 100 MG tablet    ZYLOPRIM    90 tablet     Take 1 tablet (100 mg) by mouth daily       ARIPiprazole 5 MG tablet    ABILIFY    45 tablet    Take 1.5 tablets (7.5 mg) by mouth At Bedtime       * ASPIRIN NOT PRESCRIBED    INTENTIONAL     by Other route continuous prn.       buPROPion 150 MG 24 hr tablet    WELLBUTRIN XL    90 tablet    Take 1 tablet (150 mg) by mouth every morning       calcium citrate-vitamin D 315-250 MG-UNIT Tabs per tablet    CITRUS CALCIUM +D    120 tablet    Take 2 tablets by mouth daily       cholecalciferol 1000 UNIT tablet    vitamin D     Take 1 tablet by mouth daily       EPINEPHrine 0.3 MG/0.3ML injection     0.6 mL    Inject 0.3 mLs (0.3 mg) into the muscle once as needed for anaphylaxis       furosemide 20 MG tablet    LASIX    180 tablet    Take 1 tablet (20 mg) by mouth 2 times daily       gabapentin 600 MG tablet    NEURONTIN    90 tablet    Take 1 tablet (600 mg) by mouth 3 times daily       HYDROcodone-acetaminophen 5-325 MG per tablet    NORCO    60 tablet    Take 1-2 tablets by mouth every 8 hours as needed for moderate to severe pain maximum 6 tablet(s) per day       ipratropium - albuterol 0.5 mg/2.5 mg/3 mL 0.5-2.5 (3) MG/3ML neb solution    DUONEB    30 vial    Take 1 vial (3 mLs) by nebulization every 6 hours as needed for shortness of breath / dyspnea or wheezing USE THIS INSTEAD OF ALBUTEROL INHALER AS NEEDED FOR WHEEZE COUGH OR SHORTNESS OF BREATH       ketorolac 10 MG tablet    TORADOL    20 tablet    Take 1 tablet (10 mg) by mouth every 6 hours as needed       lamoTRIgine 200 MG tablet    LaMICtal    60 tablet    Take 1 tablet (200 mg) by mouth daily       levothyroxine 150 MCG tablet    SYNTHROID/LEVOTHROID    30 tablet    Take 1 tablet (150 mcg) by mouth daily       methocarbamol 500 MG tablet    ROBAXIN     Take 500 mg by mouth       mometasone-formoterol 100-5 MCG/ACT oral inhaler    DULERA    13 g    Inhale 2 puffs into the lungs 2 times daily       omeprazole 20 MG CR capsule    priLOSEC    180 capsule     "Take 1 capsule (20 mg) by mouth 2 times daily       * order for DME      Equipment being ordered: CPAP AIRSENSE 10 5-18 CM H20 # [id number]   DN# [id number]       * order for DME     1 Units    Equipment being ordered: CPAP       * order for DME     3 Month    Equipment being ordered: CPAP supplies needed       * order for DME     1 Month    Equipment being ordered: INCONTINENCE PADS  QID       * order for DME     60 Month    Equipment being ordered: DEPENDS SIZE LARGE       * order for DME     2 Device    Equipment being ordered:   \"[id number]\" x2pair  \"20-30mmHg Farrow Hybrid Liners\" x1pair       * order for DME     2 Units    Equipment being ordered: [id number]\" x2pair   \"20-30mmHg Farrow Hybrid Liners\" x 2 pair       senna-docusate 8.6-50 MG per tablet    SENOKOT-S;PERICOLACE         simvastatin 20 MG tablet    ZOCOR    30 tablet    Take 1 tablet (20 mg) by mouth At Bedtime       traZODone 150 MG tablet    DESYREL    90 tablet    Take 1 tablet (150 mg) by mouth At Bedtime       warfarin 5 MG tablet    COUMADIN    65 tablet    as directed by Anticoagulation Clinic, current dose 15 mg Mon, Fri, 10 mg rest of week       * Notice:  This list has 10 medication(s) that are the same as other medications prescribed for you. Read the directions carefully, and ask your doctor or other care provider to review them with you.      "

## 2017-03-10 NOTE — NURSING NOTE
"Chief Complaint   Patient presents with     Shoulder Pain       Initial /64 (BP Location: Left arm, Patient Position: Chair, Cuff Size: Adult Large)  Pulse 60  Temp 97.4  F (36.3  C) (Tympanic)  Wt 226 lb 9.6 oz (102.8 kg)  LMP 09/27/2009  BMI 45.77 kg/m2 Estimated body mass index is 45.77 kg/(m^2) as calculated from the following:    Height as of 2/22/17: 4' 11\" (1.499 m).    Weight as of this encounter: 226 lb 9.6 oz (102.8 kg).  Medication Reconciliation: complete    Health Maintenance that is potentially due pending provider review:  Mammogram    Gave pt phone number/pended order to schedule mammo and/or colonoscopy(or FIT)    Marie SARGENT CMA    "

## 2017-03-14 ENCOUNTER — HOSPITAL ENCOUNTER (OUTPATIENT)
Dept: PHYSICAL THERAPY | Facility: CLINIC | Age: 52
Setting detail: THERAPIES SERIES
End: 2017-03-14
Attending: PHYSICIAN ASSISTANT
Payer: COMMERCIAL

## 2017-03-14 PROCEDURE — 40000718 ZZHC STATISTIC PT DEPARTMENT ORTHO VISIT: Performed by: PHYSICAL THERAPIST

## 2017-03-14 PROCEDURE — 97110 THERAPEUTIC EXERCISES: CPT | Mod: GP | Performed by: PHYSICAL THERAPIST

## 2017-03-15 ENCOUNTER — OFFICE VISIT (OUTPATIENT)
Dept: PSYCHOLOGY | Facility: CLINIC | Age: 52
End: 2017-03-15
Payer: COMMERCIAL

## 2017-03-15 ENCOUNTER — ANTICOAGULATION THERAPY VISIT (OUTPATIENT)
Dept: ANTICOAGULATION | Facility: CLINIC | Age: 52
End: 2017-03-15
Payer: COMMERCIAL

## 2017-03-15 DIAGNOSIS — F31.62 MODERATE MIXED BIPOLAR I DISORDER (H): Primary | ICD-10-CM

## 2017-03-15 DIAGNOSIS — Z79.01 LONG TERM CURRENT USE OF ANTICOAGULANT THERAPY: ICD-10-CM

## 2017-03-15 DIAGNOSIS — E78.00 HYPERCHOLESTEREMIA: ICD-10-CM

## 2017-03-15 DIAGNOSIS — E03.9 HYPOTHYROIDISM: ICD-10-CM

## 2017-03-15 DIAGNOSIS — I82.409 DVT (DEEP VENOUS THROMBOSIS) (H): ICD-10-CM

## 2017-03-15 DIAGNOSIS — I73.9 PVD (PERIPHERAL VASCULAR DISEASE) WITH CLAUDICATION (H): ICD-10-CM

## 2017-03-15 DIAGNOSIS — I73.9 PERIPHERAL VASCULAR DISEASE WITH CLAUDICATION (H): ICD-10-CM

## 2017-03-15 DIAGNOSIS — D75.1 POLYCYTHEMIA, SECONDARY: ICD-10-CM

## 2017-03-15 LAB
BASOPHILS # BLD AUTO: 0 10E9/L (ref 0–0.2)
BASOPHILS NFR BLD AUTO: 0.7 %
CHOLEST SERPL-MCNC: 154 MG/DL
DIFFERENTIAL METHOD BLD: ABNORMAL
EOSINOPHIL # BLD AUTO: 0.1 10E9/L (ref 0–0.7)
EOSINOPHIL NFR BLD AUTO: 1.1 %
ERYTHROCYTE [DISTWIDTH] IN BLOOD BY AUTOMATED COUNT: 20.6 % (ref 10–15)
HCT VFR BLD AUTO: 56.7 % (ref 35–47)
HDLC SERPL-MCNC: 30 MG/DL
HGB BLD-MCNC: 17.8 G/DL (ref 11.7–15.7)
IMM GRANULOCYTES # BLD: 0 10E9/L (ref 0–0.4)
IMM GRANULOCYTES NFR BLD: 0.2 %
INR PPP: 1.82 (ref 0.86–1.14)
LDLC SERPL CALC-MCNC: 90 MG/DL
LYMPHOCYTES # BLD AUTO: 1.3 10E9/L (ref 0.8–5.3)
LYMPHOCYTES NFR BLD AUTO: 28.1 %
MCH RBC QN AUTO: 27.9 PG (ref 26.5–33)
MCHC RBC AUTO-ENTMCNC: 31.4 G/DL (ref 31.5–36.5)
MCV RBC AUTO: 89 FL (ref 78–100)
MONOCYTES # BLD AUTO: 0.4 10E9/L (ref 0–1.3)
MONOCYTES NFR BLD AUTO: 8.7 %
NEUTROPHILS # BLD AUTO: 2.8 10E9/L (ref 1.6–8.3)
NEUTROPHILS NFR BLD AUTO: 61.2 %
NONHDLC SERPL-MCNC: 124 MG/DL
PLATELET # BLD AUTO: 94 10E9/L (ref 150–450)
RBC # BLD AUTO: 6.37 10E12/L (ref 3.8–5.2)
TRIGL SERPL-MCNC: 172 MG/DL
TSH SERPL DL<=0.005 MIU/L-ACNC: 2.79 MU/L (ref 0.4–4)
WBC # BLD AUTO: 4.5 10E9/L (ref 4–11)

## 2017-03-15 PROCEDURE — 99207 ZZC NO CHARGE NURSE ONLY: CPT

## 2017-03-15 PROCEDURE — 85610 PROTHROMBIN TIME: CPT | Performed by: FAMILY MEDICINE

## 2017-03-15 PROCEDURE — 80061 LIPID PANEL: CPT | Performed by: FAMILY MEDICINE

## 2017-03-15 PROCEDURE — 36415 COLL VENOUS BLD VENIPUNCTURE: CPT | Performed by: FAMILY MEDICINE

## 2017-03-15 PROCEDURE — 84443 ASSAY THYROID STIM HORMONE: CPT | Performed by: FAMILY MEDICINE

## 2017-03-15 PROCEDURE — 85025 COMPLETE CBC W/AUTO DIFF WBC: CPT | Performed by: FAMILY MEDICINE

## 2017-03-15 PROCEDURE — 90834 PSYTX W PT 45 MINUTES: CPT | Performed by: PSYCHOLOGIST

## 2017-03-15 NOTE — MR AVS SNAPSHOT
MRN:[id number]                      After Visit Summary   3/15/2017    Bia Bill    MRN: [id number]           Visit Information        Provider Department      3/15/2017 9:30 AM Kd Morris [facility] Generic      Your next 10 appointments already scheduled     Mar 16, 2017  3:00 PM CDT   [facility]    [address]  [facility] [address]   [phone]            Mar 17, 2017  2:00 PM CDT   Treatment with Orquidea Orellana PT   [facility] ([facility])    [address] [place] [address] [phone]            Mar 20, 2017  2:00 PM CDT   Return Visit with Patel Rice MD   [facility] [place] ([facility])    [address] [facility] [place] [place] [address]  [place] [id number] [phone]            Mar 24, 2017  1:15 PM CDT   MA SCREENING DIGITAL BILATERAL with [address] [facility] ([facility])    [address] [facility] [address]   [phone]           Do not use any powder, lotion or deodorant under your arms or on your breast. If you do, we will ask you to remove it before your exam.  Wear comfortable, two-piece clothing.  If you have any allergies, tell your care team.  Bring any previous mammograms from other facilities or have them mailed to the breast center.            Mar 24, 2017  2:00 PM CDT   LAB with [facility] ([facility])    [address] [address]   [phone]           Patient must bring picture ID.  Patient should be prepared to give a urine specimen  Please do not eat 10-12 hours before your appointment if you are coming in fasting for labs on lipids, cholesterol, or glucose (sugar).  Pregnant women should follow their Care Team instructions. Water with medications is  "okay. Do not drink coffee or other fluids.   If you have concerns about taking  your medications, please ask at office or if scheduling via [employer], send a message by clicking on Secure Messaging, Message Your Care Team.            Mar 24, 2017  2:30 PM CDT   Return Visit with Joshua Watts MD   [facility] ([facility])    [facility]  [address]  [facility] [address]   [phone]            2017  9:30 AM CDT   Return Visit with Kd [place]ton   [facility] ([facility])    [address] [facility] [address]   [phone]            2017  3:00 PM CDT   Return Visit with Kd Morris   [facility] ([facility])    [address] [facility] [address]   [phone]            2017  2:00 PM CDT   Return Sleep Patient with Patel Bazzi MD   [facility] ([facility])    [address]  [facility] [address]   [phone]              MyChart Information     [employer] lets you send messages to your doctor, view your test results, renew your prescriptions, schedule appointments and more. To sign up, go to [email or web address] . Click on \"Log in\" on the left side of the screen, which will take you to the Welcome page. Then click on \"Sign up Now\" on the right side of the page.     You will be asked to enter the access code listed below, as well as some personal information. Please follow the directions to create your username and password.     Your access code is: [email or web address]  Expires: 3/29/2017  3:13 PM     Your access code will  in 90 days. If you need help or a new code, please call your [place] clinic or [phone].        Care EveryWhere ID     This is your Care EveryWhere ID. This could be used by other organizations to access your [place] medical records  [id number]        "

## 2017-03-15 NOTE — MR AVS SNAPSHOT
Bia S Bill   3/15/2017   Anticoagulation Therapy Visit    Description:  51 year old female   Provider:  Mireille Torres, RN   Department:  [place] [facility]           INR as of 3/15/2017     Today's INR 1.82!      Anticoagulation Summary as of 3/15/2017     INR goal 2.0-3.0   Today's INR 1.82!   Full instructions 3/15: 15 mg; Otherwise 15 mg on Mon, Fri; 10 mg all other days   Next INR check 4/3/2017    Indications   Long term current use of anticoagulant therapy [Z79.01]  DVT (deep venous thrombosis) (H) [I82.409]  PVD (peripheral vascular disease) with claudication (H) [I73.9]         March 2017 Details    Sun Mon Tue Wed Thu Fri Sat        1               2               3               4                 5               6               7               8               9               10               11                 12               13               14               15      15 mg   See details      16      10 mg         17      15 mg         18      10 mg           19      10 mg         20      15 mg         21      10 mg         22      10 mg         23      10 mg         24      15 mg         25      10 mg           26      10 mg         27      15 mg         28      10 mg         29      10 mg         30      10 mg         31      15 mg           Date Details   03/15 This INR check               How to take your warfarin dose     To take:  10 mg Take 2 of the 5 mg tablets.    To take:  15 mg Take 3 of the 5 mg tablets.           April 2017 Details    Sun Mon Tue Wed Thu Fri Sat           1      10 mg           2      10 mg         3            4               5               6               7               8                 9               10               11               12               13               14               15                 16               17               18               19               20               21               22                 23               24               25                26               27               28               29                 30                      Date Details   No additional details    Date of next INR:  4/3/2017         How to take your warfarin dose     To take:  10 mg Take 2 of the 5 mg tablets.    To take:  15 mg Take 3 of the 5 mg tablets.

## 2017-03-15 NOTE — PROGRESS NOTES
ANTICOAGULATION FOLLOW-UP CLINIC VISIT    Patient Name:  Bia Bill  Date:  3/15/2017  Contact Type:  Telephone/ Norah Bill    SUBJECTIVE:     Patient Findings     Positives Change in medications (toradol PRN on 3-10-17), OTC meds (taking airborne for the last few days to prevent a cold)    Comments Writer explained to patient that there are several ingredients/herbals in airborne that interact with warfarin and she should not take this to prevent a cold. It's possible that some of the ingredients are causing a decreased INR today since her warfarin dose was actually increased 2 weeks ago and she did not miss any. Will recheck at lab again in 2 1/2 weeks.           OBJECTIVE    INR   Date Value Ref Range Status   03/15/2017 1.82 (H) 0.86 - 1.14 Final     Comment:     Special tube used to correct for high hematocrit       ASSESSMENT / PLAN  INR assessment SUB OTC medication   Recheck INR In: 3 WEEKS    INR Location Clinic lab     Anticoagulation Summary as of 3/15/2017     INR goal 2.0-3.0   Today's INR 1.82!   Maintenance plan 15 mg (5 mg x 3) on Mon, Fri; 10 mg (5 mg x 2) all other days   Full instructions 3/15: 15 mg; Otherwise 15 mg on Mon, Fri; 10 mg all other days   Weekly total 80 mg   Plan last modified Garima Sorto RN (2/27/2017)   Next INR check 4/3/2017   Priority INR   Target end date Indefinite    Indications   Long term current use of anticoagulant therapy [Z79.01]  DVT (deep venous thrombosis) (H) [I82.409]  PVD (peripheral vascular disease) with claudication (H) [I73.9]         Anticoagulation Episode Summary     INR check location     Preferred lab     Send INR reminders to [place] PHONE [facility] POOL    Comments * lab draw due to elevated hematocrit (fingerstick meters don't work). LEFT LE. STENT x 3 LEFT UPPER LEG. Previous arterial clot. Bridging instructions for 2/7/17 surgery in [facility] note from 1/19/17.  2/27/17 CURRENT PHONE # [phone]      Anticoagulation Care Providers      Provider Role Specialty Phone number    Kd Leong MD [facility] [phone]            See the Encounter Report to view Anticoagulation Flowsheet and Dosing Calendar (Go to Encounters tab in chart review, and find the Anticoagulation Therapy Visit)        Mireille Torres RN

## 2017-03-16 ENCOUNTER — OFFICE VISIT (OUTPATIENT)
Dept: FAMILY MEDICINE | Facility: CLINIC | Age: 52
End: 2017-03-16
Payer: COMMERCIAL

## 2017-03-16 VITALS
HEART RATE: 78 BPM | BODY MASS INDEX: 46.27 KG/M2 | SYSTOLIC BLOOD PRESSURE: 128 MMHG | HEIGHT: 59 IN | DIASTOLIC BLOOD PRESSURE: 64 MMHG | WEIGHT: 229.5 LBS | TEMPERATURE: 97.8 F | OXYGEN SATURATION: 96 %

## 2017-03-16 DIAGNOSIS — J45.30 MILD PERSISTENT ASTHMA WITHOUT COMPLICATION: ICD-10-CM

## 2017-03-16 DIAGNOSIS — Z98.890 S/P ARTHROSCOPY OF SHOULDER: Primary | ICD-10-CM

## 2017-03-16 DIAGNOSIS — E78.00 HYPERCHOLESTEREMIA: ICD-10-CM

## 2017-03-16 DIAGNOSIS — F33.1 MAJOR DEPRESSIVE DISORDER, RECURRENT EPISODE, MODERATE (H): ICD-10-CM

## 2017-03-16 DIAGNOSIS — E03.9 HYPOTHYROIDISM, UNSPECIFIED TYPE: ICD-10-CM

## 2017-03-16 DIAGNOSIS — I73.9 PVD (PERIPHERAL VASCULAR DISEASE) WITH CLAUDICATION (H): ICD-10-CM

## 2017-03-16 PROCEDURE — 99214 OFFICE O/P EST MOD 30 MIN: CPT | Mod: 24 | Performed by: PHYSICIAN ASSISTANT

## 2017-03-16 RX ORDER — WARFARIN SODIUM 5 MG/1
TABLET ORAL
Qty: 65 TABLET | Refills: 11 | Status: CANCELLED | OUTPATIENT
Start: 2017-03-16

## 2017-03-16 RX ORDER — ARIPIPRAZOLE 5 MG/1
7.5 TABLET ORAL AT BEDTIME
Qty: 45 TABLET | Refills: 0 | Status: SHIPPED | OUTPATIENT
Start: 2017-03-16 | End: 2017-03-29

## 2017-03-16 RX ORDER — METHOCARBAMOL 500 MG/1
500 TABLET, FILM COATED ORAL
Qty: 240 TABLET | Status: CANCELLED | OUTPATIENT
Start: 2017-03-16

## 2017-03-16 RX ORDER — SIMVASTATIN 20 MG
20 TABLET ORAL AT BEDTIME
Qty: 30 TABLET | Refills: 0 | Status: SHIPPED | OUTPATIENT
Start: 2017-03-16 | End: 2017-04-24

## 2017-03-16 RX ORDER — GABAPENTIN 600 MG/1
600 TABLET ORAL 3 TIMES DAILY
Qty: 90 TABLET | Refills: 1 | Status: SHIPPED | OUTPATIENT
Start: 2017-03-16 | End: 2017-05-11

## 2017-03-16 RX ORDER — ALLOPURINOL 100 MG/1
100 TABLET ORAL DAILY
Qty: 90 TABLET | Refills: 2 | Status: CANCELLED | OUTPATIENT
Start: 2017-03-16

## 2017-03-16 RX ORDER — HYDROCODONE BITARTRATE AND ACETAMINOPHEN 5; 325 MG/1; MG/1
1-2 TABLET ORAL EVERY 8 HOURS PRN
Qty: 30 TABLET | Refills: 0 | Status: SHIPPED | OUTPATIENT
Start: 2017-03-16 | End: 2017-04-27

## 2017-03-16 RX ORDER — LEVOTHYROXINE SODIUM 150 UG/1
150 TABLET ORAL DAILY
Qty: 90 TABLET | Refills: 1 | Status: SHIPPED | OUTPATIENT
Start: 2017-03-16 | End: 2017-10-04

## 2017-03-16 NOTE — PROGRESS NOTES
Progress Note  Disclaimer: This note consists of symbols derived from keyboarding, dictation and/or voice recognition software. As a result, there may be errors in the script that have gone undetected. Please consider this when interpreting information found in this chart.    Client Name: Bia Bill  Date: 3/15/2017         Service Type: Individual      Session Start Time: 9:30 AM  Session End Time: 10:15 AM      Session Length: 45     Session #: 9     Attendees: Client attended alone    Treatment Plan Last Reviewed: 3/1/2016  PHQ-9 / CHIN-7 : 12     DATA   Client reported she is doing physical therapy for her shoulder, pain is reported as constant Client is limiting her exposure to narcotics. Client reports otherwise things are going very well right now she is standing up for herself and has gained much of her self-respect back. Moods are reported as stable.     Progress Since Last Session (Related to Symptoms / Goals / Homework):   Symptoms: Improved    Homework: Achieved / completed to satisfaction      Episode of Care Goals: Satisfactory progress - ACTION (Actively working towards change); Intervened by reinforcing change plan / affirming steps taken     Current / Ongoing Stressors and Concerns:   Recurrent depression  family relational problems,pain from recent shoulder surgery   Treatment Objective(s) Addressed in This Session:   Increase interest, engagement, and pleasure in doing things  Decrease frequency and intensity of feeling down, depressed, hopeless       Intervention:   CBT: Behavioral activation support for gains made. Use of distraction techniques and reviewed breathing techniques for pain management        ASSESSMENT: Current Emotional / Mental Status (status of significant symptoms):   Risk status (Self / Other harm or suicidal ideation)   Client denies current fears or concerns for personal safety.   Client denies current or recent suicidal  ideation or behaviors.   Client denies current or recent homicidal ideation or behaviors.   Client denies current or recent self injurious behavior or ideation.   Client denies other safety concerns.   A safety and risk management plan has not been developed at this time, however client was given the after-hours number / 911 should there be a change in any of these risk factors.     Appearance:   Appropriate    Eye Contact:   Good    Psychomotor Behavior: Normal    Attitude:   Cooperative    Orientation:   All   Speech    Rate / Production: Normal     Volume:  Normal    Mood:    Normal   Affect:    Appropriate    Thought Content:  Clear    Thought Form:  Coherent  Logical    Insight:    Good      Medication Review:   No changes to current psychiatric medication(s)     Medication Compliance:   Yes     Changes in Health Issues:   None reported     Chemical Use Review:   Substance Use: Chemical use reviewed, no active concerns identified      Tobacco Use: No current tobacco use.       Collateral Reports Completed:   Not Applicable    PLAN: (Client Tasks / Therapist Tasks / Other)  Client to continue sticking to her boundaries with children and other family members. Client to continue to get out of the house at least 3 times per week, remember her self-care plan. Client to practice diaphragmatic breathing on a regular basis especially when pain flares.        Kd Morris                                                         ________________________________________________________________________    Treatment Plan    Client's Name: Bia Bill  YOB: 1965    Date: 3/1/2017      DSM5 Diagnoses: (Sustained by DSM5 Criteria Listed Above)  Diagnoses: 296.40 Bipolar I Disorder Current or Most Recent Episode Manic, Unspecified  Psychosocial & Contextual Factors: financial difficulties, chronic pain, roommate issues  WHODAS 2.0 (12 item)               This questionnaire asks about difficulties due to  health conditions. Health conditions             include disease or illnesses, other health problems that may be short or long lasting,             injuries, mental health or emotional problems, and problems with alcohol or drugs.                         Think back over the past 30 days and answer these questions, thinking about how much             difficulty you had doing the following activities. For each question, please [place] only             one response.      S1  Standing for long periods such as 30 minutes?  Mild =           2    S2  Taking care of household responsibilities?  Moderate =   3    S3  Learning a new task, for example, learning how to get to a new place?  Mild =           2    S4  How much of a problem do you have joining community activities (for example, festivals, [employer] or other activities) in the same way as anyone else can?  Moderate =   3    S5  How much have you been emotionally affected by your health problems?  Mild =           2        In the past 30 days, how much difficulty did you have in:    S6  Concentrating on doing something for ten minutes?  Mild =           2    S7  Walking a long distance such as a kilometer (or equivalent)?  Severe =       4    S8  Washing your whole body?  None =         1    S9  Getting dressed?  None =         1    S10  Dealing with people you do not know?  Moderate =   3    S11  Maintaining a friendship?  Severe =       4    S12  Your day to day work?  Moderate =   3       H1  Overall, in the past 30 days, how many days were these difficulties present?  Record number of days seven    H2  In the past 30 days, for how many days were you totally unable to carry out your usual activities or work because of any health condition?  Record number of days  seven    H3  In the past 30 days, not counting the days that you were totally unable, for how many days did you cut back or reduce your usual activities or work because of any health condition?  Record  number of days five                   Referral / Collaboration:  Referral to another professional/service is not indicated at this time..    Anticipated number of session or this episode of care: 15    Goals  1. Education- the Biopsychosocial model of depression  a. Client will be able to describe how depression is effecting them physically, emotionally and socially  2. Behavioral Activation  a. Client will learn to assess their depression on a day to day basis  b. Client will Identify two forms of exercise/activity and engage in them 3 times per week  c. Client will Identify 3 things that make them laugh, and engage in these 5 times per week.  d. Client will Identify 1-2Creative activities or hobbies  and engage in them 2 times per week  e. Client will identify music, movies, books that make them feel good and use them 3-4 times per week  3. Self-care  a. Client will identify 5 things they can do just for themselves  b. Client will take time for quiet, reflection, meditation 5 times per week  c. Client will Learn to set boundaries when appropriate  d. Client will Identify 2 individuals they can call on for support, distraction  4. Assessment of progress  a. Client will engage in assessment of their progress on a regular basis    Bipolar Disorder  Treatment plan:  1. Education- the Biopsychosocial model of Bipolar Disorder    a. Client will be able to describe in general terms what Bipolar Disorder  is and is not  b. Client will be able to describe how Bipolar Disorder  has affected their life in at least two different areas, such as school or work and Home/relationships  c. Clients parents/guardians or significant others will be provided information on what Bipolar Disorder  is and the ways it can affect relationships and be encouraged to be a part of clients treatment team.  2. Medication  a. Client will participate in medication evaluation for Bipolar Disorder  symptoms and follow medication recommendations.    3. Identification and management of triggers  a. Client and therapist will examine patient s history to determine if there are predictable triggers for manic or depressive episodes (e.g. boredom, anger, family stress)  b. Client and therapist will develop means of diffusing these triggers (e.g. relaxation strategies, boundary setting, anger management)  4. Comorbid conditions   a. Client and therapist will asses for comorbid conditions (e.g. anxiety, depression, substance use). and add additional items to treatment plan as needed  5. Self-care  a. Client will identify 3 things they can do just for themselves  b. Client will take time for quiet, reflection, meditation 3 times per week  c. Client will Learn to set boundaries when appropriate  d. Client will Identify 2 individuals they can call on for support, distraction  5. Behavioral Activation  a. Client will Identify two forms of exercise/activity and engage in them 3 times per week  b. Client will Identify 2 things that make them laugh, and engage in these 3 times per week.  c. Client will Identify 2 Creative activities or hobbies  and engage in them 2 times per week  d. Client will identify music, movies, books that make them feel good and use them 3 times per week  6. Assessment of progress  a. Client will engage in assessment of their progress on a regular basis    Client has reviewed and agreed to the above plan.      Kd Morris  3/1/2017

## 2017-03-16 NOTE — LETTER
My Depression Action Plan  Name: Bia Bill   Date of Birth 1965  Date: 3/16/2017    My doctor: Kd Leong   My clinic: [address] [address] [phone]          GREEN    ZONE   Good Control    What it looks like:     Things are going generally well. You have normal up s and down s. You may even feel depressed from time to time, but bad moods usually last less than a day.   What you need to do:  1. Continue to care for yourself (see self care plan)  2. Check your depression survival kit and update it as needed  3. Follow your physician s recommendations including any medication.  4. Do not stop taking medication unless you consult with your physician first.           YELLOW         ZONE Getting Worse    What it looks like:     Depression is starting to interfere with your life.     It may be hard to get out of bed; you may be starting to isolate yourself from others.    Symptoms of depression are starting to last most all day and this has happened for several days.     You may have suicidal thoughts but they are not constant.   What you need to do:     1. Call your care team, your response to treatment will improve if you keep your care team informed of your progress. Yellow periods are signs an adjustment may need to be made.     2. Continue your self-care, even if you have to fake it!    3. Talk to someone in your support network    4. Open up your depression survival kit           RED    ZONE Medical Alert - Get Help    What it looks like:     Depression is seriously interfering with your life.     You may experience these or other symptoms: You can t get out of bed most days, can t work or engage in other necessary activities, you have trouble taking care of basic hygiene, or basic responsibilities, thoughts of suicide or death that will not go away, self-injurious behavior.     What you need to do:  1. Call your care team and request  a same-day appointment. If they are not available (weekends or after hours) call your local crisis line, emergency room or 911.      Electronically signed by: Jennifer Harris, March 16, 2017    Depression Self Care Plan / Survival Kit    Self-Care for Depression  Here s the deal. Your body and mind are really not as separate as most people think.  What you do and think affects how you feel and how you feel influences what you do and think. This means if you do things that people who feel good do, it will help you feel better.  Sometimes this is all it takes.  There is also a place for medication and therapy depending on how severe your depression is, so be sure to consult with your medical provider and/ or Behavioral Health Consultant if your symptoms are worsening or not improving.     In order to better manage my stress, I will:    Exercise  Get some form of exercise, every day. This will help reduce pain and release endorphins, the  feel good  chemicals in your brain. This is almost as good as taking antidepressants!  This is not the same as joining a gym and then never going! (they count on that by the way ) It can be as simple as just going for a walk or doing some gardening, anything that will get you moving.      Hygiene   Maintain good hygiene (Get out of bed in the morning, Make your bed, Brush your teeth, Take a shower, and Get dressed like you were going to work, even if you are unemployed).  If your clothes don't fit try to get ones that do.    Diet  I will strive to eat foods that are good for me, drink plenty of water, and avoid excessive sugar, caffeine, alcohol, and other mood-altering substances.  Some foods that are helpful in depression are: complex carbohydrates, B vitamins, flaxseed, fish or fish oil, fresh fruits and vegetables.    Psychotherapy  I agree to participate in Individual Therapy (if recommended).    Medication  If prescribed medications, I agree to take them.  Missing doses can result in  serious side effects.  I understand that drinking alcohol, or other illicit drug use, may cause potential side effects.  I will not stop my medication abruptly without first discussing it with my provider.    Staying Connected With Others  I will stay in touch with my friends, family members, and my primary care provider/team.    Use your imagination  Be creative.  We all have a creative side; it doesn t matter if it s oil painting, sand castles, or mud pies! This will also kick up the endorphins.    Witness Beauty  (AKA stop and smell the roses) Take a look outside, even in mid-winter. Notice colors, textures. Watch the squirrels and birds.     Service to others  Be of service to others.  There is always someone else in need.  By helping others we can  get out of ourselves  and remember the really important things.  This also provides opportunities for practicing all the other parts of the program.    Humor  Laugh and be silly!  Adjust your TV habits for less news and crime-drama and more comedy.    Control your stress  Try breathing deep, massage therapy, biofeedback, and meditation. Find time to relax each day.     My support system    Clinic Contact:  Phone number:    Contact 1:  Phone number:    Contact 2:  Phone number:    [employer]/:  Phone number:    Therapist:  Phone number:    Local crisis center:    Phone number:    Other community support:  Phone number:

## 2017-03-16 NOTE — LETTER
My Asthma Action Plan  Name: Bia Bill   YOB: 1965  Date: 3/16/2017   My doctor: Ayanna Hoff PA-C   My clinic: [facility]        My Control Medicine: Mometasone + formoterol (Dulera) -  100/5 mcg /ACT  My Rescue Medicine: Albuterol (Proair/Ventolin/Proventil) inhaler 108 mcg   My Asthma Severity: mild persistent  Avoid your asthma triggers: upper respiratory infections, dust mites, pollens, mold, aspirin, strong odors and fumes and exercise or sports               GREEN ZONE     Good Control    I feel good    No cough or wheeze    Can work, sleep and play without asthma symptoms       Take your asthma control medicine every day.     1. If exercise triggers your asthma, take your rescue medication    15 minutes before exercise or sports, and    During exercise if you have asthma symptoms  2. Spacer to use with inhaler: If you have a spacer, make sure to use it with your inhaler             YELLOW ZONE     Getting Worse  I have ANY of these:    I do not feel good    Cough or wheeze    Chest feels tight    Wake up at night   1. Keep taking your Green Zone medications  2. Start taking your rescue medicine:    every 20 minutes for up to 1 hour. Then every 4 hours for 24-48 hours.  3. If you stay in the Yellow Zone for more than 12-24 hours, contact your doctor.  4. If you do not return to the Green Zone in 12-24 hours or you get worse, start taking your oral steroid medicine if prescribed by your provider.           RED ZONE     Medical Alert - Get Help  I have ANY of these:    I feel awful    Medicine is not helping    Breathing getting harder    Trouble walking or talking    Nose opens wide to breathe       1. Take your rescue medicine NOW  2. If your provider has prescribed an oral steroid medicine, start taking it NOW  3. Call your doctor NOW  4. If you are still in the Red Zone after 20 minutes and you have not reached your doctor:    Take your rescue medicine again  and    Call 911 or go to the emergency room right away    See your regular doctor within 2 weeks of an Emergency Room or Urgent Care visit for follow-up treatment.        Electronically signed by: Jennifer Harris, March 16, 2017    Annual Reminders:  Meet with Asthma Educator,  Flu Shot in the Fall, consider Pneumonia Vaccination for patients with asthma (aged 19 and older).    Pharmacy:    [place], [place] - [address] [id number] [place] [id number]                    Asthma Triggers  How To Control Things That Make Your Asthma Worse    Triggers are things that make your asthma worse.  Look at the list below to help you find your triggers and what you can do about them.  You can help prevent asthma flare-ups by staying away from your triggers.      Trigger                                                          What you can do   Cigarette Smoke  Tobacco smoke can make asthma worse. Do not allow smoking in your home, car or around you.  Be sure no one smokes at a child s day care or school.  If you smoke, ask your health care provider for ways to help you quit.  Ask family members to quit too.  Ask your health care provider for a referral to Quit Plan to help you quit smoking, or call 1-198-033-PLAN.     Colds, Flu, Bronchitis  These are common triggers of asthma. Wash your hands often.  Don t touch your eyes, nose or mouth.  Get a flu shot every year.     Dust Mites  These are tiny bugs that live in cloth or carpet. They are too small to see. Wash sheets and blankets in hot water every week.   Encase pillows and mattress in dust mite proof covers.  Avoid having carpet if you can. If you have carpet, vacuum weekly.   Use a dust mask and HEPA vacuum.   Pollen and Outdoor Mold  Some people are allergic to trees, grass, or weed pollen, or molds. Try to keep your windows closed.  Limit time out doors when pollen count is high.   Ask you health care  provider about taking medicine during allergy season.     Animal Dander  Some people are allergic to skin flakes, urine or saliva from pets with fur or feathers. Keep pets with fur or feathers out of your home.    If you can t keep the pet outdoors, then keep the pet out of your bedroom.  Keep the bedroom door closed.  Keep pets off cloth furniture and away from stuffed toys.     Mice, Rats, and Cockroaches  Some people are allergic to the waste from these pests.   Cover food and garbage.  Clean up spills and food crumbs.  Store grease in the refrigerator.   Keep food out of the bedroom.   Indoor Mold  This can be a trigger if your home has high moisture. Fix leaking faucets, pipes, or other sources of water.   Clean moldy surfaces.  Dehumidify basement if it is damp and smelly.   Smoke, Strong Odors, and Sprays  These can reduce air quality. Stay away from strong odors and sprays, such as perfume, powder, hair spray, paints, smoke incense, paint, cleaning products, candles and new carpet.   Exercise or Sports  Some people with asthma have this trigger. Be active!  Ask your doctor about taking medicine before sports or exercise to prevent symptoms.    Warm up for 5-10 minutes before and after sports or exercise.     Other Triggers of Asthma  Cold air:  Cover your nose and mouth with a scarf.  Sometimes laughing or crying can be a trigger.  Some medicines and food can trigger asthma.

## 2017-03-16 NOTE — PATIENT INSTRUCTIONS
Psychiatry/Lisa Bai - make appointment   - Safety plan was reviewed; to the ER as needed or call after hours crisis line; [phone]  - Education and counseling was done regarding use of medications, psychotherapy options  - Call [phone] for appointment or to speak to a nurse.    -Office hours: Monday through Thursday 8:00 am to 4:30 pm; Friday 8:00 am to Noon.     Follow up with Dr. Leong in a few weeks for a recheck

## 2017-03-16 NOTE — PROGRESS NOTES
SUBJECTIVE:                                                    Bia Bill is a 51 year old female who presents to clinic today for the following health issues:    Medication Followup of gabapentin and hydrocodone.    Taking Medication as prescribed: yes    Side Effects:  None    Medication Helping Symptoms:  yes   **Thinks she needs a dose change on her gabapentin.   **Was prescribed ketorolac for migrains and shoulder and it does not help at all.    Surgery 1 month ago  gabepentin taking 900 TID still  Vicodin: 1 every 8 hours, some days only took one.    Twice a day exercises. 10/10 pain with exercises.   Not very painful when she doesn't move  She is not sure how long she should be taking pain medication    According to [facility], last controlled prescription was:  [place] #60 from Dr. Rice 2/22/17    Problem list and histories reviewed & adjusted, as indicated.  Additional history: as documented    Patient Active Problem List   Diagnosis     Mild persistent asthma     Polyneuropathy in other diseases classified elsewhere (H)     DVT (deep venous thrombosis) (H)     Alcohol abuse, in remission     SECONDARY POLYCYTHEMIA     Chondromalacia of patella     Sensorineural hearing loss, asymmetrical     Developmental reading disorder     Esophageal reflux     Hypothyroidism     Fatty liver     GILES (Obstructive Sleep Apnea)-Moderate (AHI 16)     RLS (restless legs syndrome)     CARDIOVASCULAR SCREENING; LDL GOAL LESS THAN 100     Smoker     Erythrocytosis     Moderate mixed bipolar I disorder (H)     Personality disorder, depressive     COPD (chronic obstructive pulmonary disease) (H)     Current use of long term anticoagulation     Rosacea     Health Care Home     DDD (degenerative disc disease), cervical     Benign neoplasm of colon (POLYPOSIS)     Stroke (H)     Somatization disorder     Acute gouty arthritis     Sebaceous cyst of right axilla     HTN, goal below 140/90     Left leg pain     Right  shoulder pain     Headache     Vitamin D deficiency     Long term current use of anticoagulant therapy     Morbid obesity (H)     PVD (peripheral vascular disease) with claudication (H)     Trochanteric bursitis of both hips     Cyst of left ovary     Acute bronchospasm     Morbid obesity with alveolar hypoventilation (H)     Past Surgical History   Procedure Laterality Date     Lithotripsy  2004     Lithotrypsy     Biliary stent single use cov       3 stints in left leg     D & c  10/26/09     with uterine ablation     Hysterectomy, pap no longer indicated  1-4-2010     Esophagoscopy, gastroscopy, duodenoscopy (egd), combined  4/21/2014     Procedure: Gastroscopy;  Surgeon: Moris Thomas MD;  Location: [facility]     Bone marrow biopsy, bone specimen, needle/trocar N/A 11/17/2014     Procedure: BIOPSY BONE MARROW;  Surgeon: Hay Aaron MD;  Location: [facility]       Social History   Substance Use Topics     Smoking status: Current Every Day Smoker     Packs/day: 0.50     Years: 34.00     Types: Cigarettes     Smokeless tobacco: Never Used      Comment: started at age 11.  Quit during pregnancyX4.  0.5 ppd (6/2016)     Alcohol use No      Comment: quit 1995     Family History   Problem Relation Age of Onset     Hypertension Mother      DIABETES Mother      Blood Disease Mother      HEART DISEASE Mother      chf     CEREBROVASCULAR DISEASE Mother      Hypertension Father      CANCER Father      lung cancer     Allergies Sister      Depression Sister      Hypertension Sister          Labs reviewed in EPIC    Reviewed and updated as needed this visit by clinical staff  Tobacco  Allergies  Meds  Problems  Med Hx  Surg Hx  Fam Hx  Soc Hx        Reviewed and updated as needed this visit by Provider  Allergies  Meds  Problems         ROS:  Other than noted above, general, HEENT, respiratory, cardiac and gastrointestinal systems are negative.     OBJECTIVE:                                              "       /64 (BP Location: Left arm, Patient Position: Chair, Cuff Size: Adult Large)  Pulse 78  Temp 97.8  F (36.6  C) (Tympanic)  Ht 4' 11\" (1.499 m)  Wt 229 lb 8 oz (104.1 kg)  LMP 09/27/2009  SpO2 96%  BMI 46.35 kg/m2  Body mass index is 46.35 kg/(m^2).  GENERAL: healthy, alert and no distress  RESP: lungs clear to auscultation - no rales, rhonchi or wheezes  CV: regular rate and rhythm, normal S1 S2, no S3 or S4, no murmur, click or rub, no peripheral edema and peripheral pulses strong  ABDOMEN: soft, nontender, no hepatosplenomegaly, no masses and bowel sounds normal  MS: no gross musculoskeletal defects noted, no edema  PSYCH: mentation appears normal, affect normal/bright     ASSESSMENT/PLAN:                                                      ASSESSMENT/PLAN:      ICD-10-CM    1. S/P arthroscopy of shoulder Z98.890 HYDROcodone-acetaminophen (NORCO) 5-325 MG per tablet   2. Hypercholesteremia E78.00 simvastatin (ZOCOR) 20 MG tablet   3. PVD (peripheral vascular disease) with claudication (H) I73.9 gabapentin (NEURONTIN) 600 MG tablet   4. Mild persistent asthma without complication J45.30 mometasone-formoterol (DULERA) 100-5 MCG/ACT oral inhaler   5. Hypothyroidism, unspecified type E03.9 levothyroxine (SYNTHROID/LEVOTHROID) 150 MCG tablet   6. Major depressive disorder, recurrent episode, moderate (H) F33.1 ARIPiprazole (ABILIFY) 5 MG tablet     She needs psychiatry number to reschedule appointment  Discussed pain medication - I am not sure how long she should be on this medication post op. She will discuss this with Dr. Rice.  Discussed continuing current gabapentin dose, she is okay with this. She has been taking 900 mg TID, written for 600 TID which we discussed due to guidelines.  She will continue physical therapy   We reviewed medications, reviewed recent labwork. Refilled current medications.    Patient Instructions   Psychiatry/Lisa esquivel appointment   - Safety plan was reviewed; " "to the ER as needed or call after hours crisis line; [phone]  - Education and counseling was done regarding use of medications, psychotherapy options  - Call [phone] for appointment or to speak to a nurse.    -Office hours: Monday through Thursday 8:00 am to 4:30 pm; Friday 8:00 am to Noon.     Follow up with Dr. Leong in a few weeks for a recheck     reports that she has been smoking Cigarettes.  She has a 17.00 pack-year smoking history. She has never used smokeless tobacco.  Tobacco Cessation Action Plan: Information offered: Patient not interested at this time  she is not ready yet she is \"debating\" quitting smoking    25 minutes was spent face to face with the patient today discussing history, diagnosis, and follow-up plan as noted above. Over 50% of the visit was spent in counseling and coordination of care. Total Visit Time: 25 minutes.     Ayanna Hoff PA-C  [facility]"

## 2017-03-16 NOTE — MR AVS SNAPSHOT
After Visit Summary   3/16/2017    Bia Bill    MRN: [id number]           Patient Information     Date Of Birth          1965        Visit Information        Provider Department      3/16/2017 3:00 PM Ayanna Hoff PA-C [facility]        Today's Diagnoses     Hypothyroidism, unspecified type    -  1    Hypercholesteremia        PVD (peripheral vascular disease) with claudication (H)        S/P arthroscopy of shoulder        Mild persistent asthma without complication        Acute gouty arthritis        Major depressive disorder, recurrent episode, moderate (H)          Care Instructions    Psychiatry/Lisa Bai - make appointment   - Safety plan was reviewed; to the ER as needed or call after hours crisis line; [phone]  - Education and counseling was done regarding use of medications, psychotherapy options  - Call [phone] for appointment or to speak to a nurse.    -Office hours: Monday through Thursday 8:00 am to 4:30 pm; Friday 8:00 am to Noon.     Follow up with Dr. Leong in a few weeks for a recheck          Follow-ups after your visit        Your next 10 appointments already scheduled     Mar 17, 2017  2:00 PM CDT   Treatment with Orquidea Orellana PT   [facility] ([facility])    [address] [place] [address] [place] [address] [phone]            Mar 20, 2017  2:00 PM CDT   Return Visit with Patel Rice MD   [facility] [place] ([facility] [place])    [address]  [place] [address]   [phone]            Mar 24, 2017  1:15 PM CDT   MA SCREENING DIGITAL BILATERAL with [facility]   [facility] ([facility])    [address] [facility] [place] [address] [phone]           Do not use any powder, lotion or deodorant under your arms or on your breast. If you do, we will ask you to remove it before your exam.  Wear  comfortable, two-piece clothing.  If you have any allergies, tell your care team.  Bring any previous mammograms from other facilities or have them mailed to the breast center.            Mar 24, 2017  2:00 PM CDT   LAB with Levine, Susan. \Hospital [facility] and Outlook.\"" Lab ([facility])    [address] [place] [address]   [phone]           Patient must bring picture ID.  Patient should be prepared to give a urine specimen  Please do not eat 10-12 hours before your appointment if you are coming in fasting for labs on lipids, cholesterol, or glucose (sugar).  Pregnant women should follow their Care Team instructions. Water with medications is okay. Do not drink coffee or other fluids.   If you have concerns about taking  your medications, please ask at office or if scheduling via [employer], send a message by clicking on Secure Messaging, Message Your Care Team.            Mar 24, 2017  2:30 PM CDT   Return Visit with Joshua Watts MD   [facility] ([facility])    [facility]  [address]  [facility] [address]   [phone]            Apr 03, 2017  9:30 AM CDT   [facility]    [address] [facility] [place] [address]   [phone]            Apr 18, 2017  3:00 PM CDT   Return Visit with Kd Morris   [facility] ([facility])    [address] [facility] [place] [address]   [phone]            Apr 27, 2017  2:00 PM CDT   Return Sleep Patient with Patel Bazzi MD   [facility] ([facility])    [address]  [facility] [address] [phone]              Who to contact     Normal or non-critical lab and imaging results will be communicated to you by MyChart, letter or phone within 4 business days after the clinic has received the results. If you do not hear from us  "within 7 days, please contact the clinic through [employer] or phone. If you have a critical or abnormal lab result, we will notify you by phone as soon as possible.  Submit refill requests through [employer] or call your pharmacy and they will forward the refill request to us. Please allow 3 business days for your refill to be completed.          If you need to speak with a  for additional information , please call: [phone]             Additional Information About Your Visit        [employer] Information     [employer] lets you send messages to your doctor, view your test results, renew your prescriptions, schedule appointments and more. To sign up, go to [email or web address] . Click on \"Log in\" on the left side of the screen, which will take you to the Welcome page. Then click on \"Sign up Now\" on the right side of the page.     You will be asked to enter the access code listed below, as well as some personal information. Please follow the directions to create your username and password.     Your access code is: [email or web address]  Expires: 3/29/2017  3:13 PM     Your access code will  in 90 days. If you need help or a new code, please call your [place] clinic or [phone].        Care EveryWhere ID     This is your Care EveryWhere ID. This could be used by other organizations to access your [place] medical records  [id number]        Your Vitals Were     Pulse Temperature Height Last Period Pulse Oximetry BMI (Body Mass Index)    78 97.8  F (36.6  C) (Tympanic) 4' 11\" (1.499 m) 2009 96% 46.35 kg/m2       Blood Pressure from Last 3 Encounters:   17 128/64   03/10/17 114/64   17 131/70    Weight from Last 3 Encounters:   17 229 lb 8 oz (104.1 kg)   03/10/17 226 lb 9.6 oz (102.8 kg)   17 223 lb 6.4 oz (101.3 kg)              We Performed the Following     Asthma Action Plan (AAP)     DEPRESSION ACTION PLAN (DAP)          Where to get your medicines      These " medications were sent to [id number] - [place], [place] - [address]  [address] [facility] [address]    Hours:  Closed 10-16-08 business to [facility] Phone:  [phone]     ARIPiprazole 5 MG tablet    gabapentin 600 MG tablet    levothyroxine 150 MCG tablet    mometasone-formoterol 100-5 MCG/ACT oral inhaler    simvastatin 20 MG tablet         Some of these will need a paper prescription and others can be bought over the counter.  Ask your nurse if you have questions.     Bring a paper prescription for each of these medications     HYDROcodone-acetaminophen 5-325 MG per tablet          Primary Care Provider Office Phone # Fax #    Kd Leong [phone]       [facility] [address] [address]        Thank you!     Thank you for choosing [facility]  for your care. Our goal is always to provide you with excellent care. Hearing back from our patients is one way we can continue to improve our services. Please take a few minutes to complete the written survey that you may receive in the mail after your visit with us. Thank you!             Your Updated Medication List - Protect others around you: Learn how to safely use, store and throw away your medicines at www.disposemymeds.org.          This list is accurate as of: 3/16/17  4:12 PM.  Always use your most recent med list.                   Brand Name Dispense Instructions for use    acetaminophen 500 MG tablet    TYLENOL     Take 500-1,000 mg by mouth every 6 hours as needed for mild pain       * albuterol (2.5 MG/3ML) 0.083% neb solution     1 Box    Take 1 vial (2.5 mg) by nebulization every 6 hours as needed for shortness of breath / dyspnea or wheezing       * albuterol 108 (90 BASE) MCG/ACT Inhaler    PROAIR HFA/PROVENTIL HFA/VENTOLIN HFA    3 Inhaler    Inhale 2 puffs into the lungs every 6 hours as needed for shortness of breath / dyspnea or wheezing       allopurinol 100  MG tablet    ZYLOPRIM    90 tablet    Take 1 tablet (100 mg) by mouth daily       ARIPiprazole 5 MG tablet    ABILIFY    45 tablet    Take 1.5 tablets (7.5 mg) by mouth At Bedtime       * ASPIRIN NOT PRESCRIBED    INTENTIONAL     by Other route continuous prn.       buPROPion 150 MG 24 hr tablet    WELLBUTRIN XL    90 tablet    Take 1 tablet (150 mg) by mouth every morning       cholecalciferol 1000 UNIT tablet    vitamin D     Take 1 tablet by mouth daily       EPINEPHrine 0.3 MG/0.3ML injection     0.6 mL    Inject 0.3 mLs (0.3 mg) into the muscle once as needed for anaphylaxis       furosemide 20 MG tablet    LASIX    180 tablet    Take 1 tablet (20 mg) by mouth 2 times daily       gabapentin 600 MG tablet    NEURONTIN    90 tablet    Take 1 tablet (600 mg) by mouth 3 times daily       HYDROcodone-acetaminophen 5-325 MG per tablet    NORCO    30 tablet    Take 1-2 tablets by mouth every 8 hours as needed for moderate to severe pain maximum 6 tablet(s) per day       ipratropium - albuterol 0.5 mg/2.5 mg/3 mL 0.5-2.5 (3) MG/3ML neb solution    DUONEB    30 vial    Take 1 vial (3 mLs) by nebulization every 6 hours as needed for shortness of breath / dyspnea or wheezing USE THIS INSTEAD OF ALBUTEROL INHALER AS NEEDED FOR WHEEZE COUGH OR SHORTNESS OF BREATH       ketorolac 10 MG tablet    TORADOL    20 tablet    Take 1 tablet (10 mg) by mouth every 6 hours as needed       lamoTRIgine 200 MG tablet    LaMICtal    60 tablet    Take 1 tablet (200 mg) by mouth daily       levothyroxine 150 MCG tablet    SYNTHROID/LEVOTHROID    90 tablet    Take 1 tablet (150 mcg) by mouth daily       methocarbamol 500 MG tablet    ROBAXIN     Take 500 mg by mouth       mometasone-formoterol 100-5 MCG/ACT oral inhaler    DULERA    13 g    Inhale 2 puffs into the lungs 2 times daily       omeprazole 20 MG CR capsule    priLOSEC    180 capsule    Take 1 capsule (20 mg) by mouth 2 times daily       * order for DME      Equipment being  "ordered: CPAP AIRSENSE 10 5-18 CM H20 # [id number]   DN# [id number]       * order for DME     1 Units    Equipment being ordered: CPAP       * order for DME     3 Month    Equipment being ordered: CPAP supplies needed       * order for DME     1 Month    Equipment being ordered: INCONTINENCE PADS  QID       * order for DME     60 Month    Equipment being ordered: DEPENDS SIZE LARGE       * order for DME     2 Device    Equipment being ordered:   \"[id number]\" x2pair  \"20-30mmHg [place] Hybrid Liners\" x1pair       * order for DME     2 Units    Equipment being ordered: [id number]\" x2pair   \"20-30mmHg [place] Hybrid Liners\" x 2 pair       simvastatin 20 MG tablet    ZOCOR    30 tablet    Take 1 tablet (20 mg) by mouth At Bedtime       traZODone 150 MG tablet    DESYREL    90 tablet    Take 1 tablet (150 mg) by mouth At Bedtime       warfarin 5 MG tablet    COUMADIN    65 tablet    as directed by Anticoagulation Clinic, current dose 15 mg Mon, Fri, 10 mg rest of week       * Notice:  This list has 10 medication(s) that are the same as other medications prescribed for you. Read the directions carefully, and ask your doctor or other care provider to review them with you.      "

## 2017-03-16 NOTE — NURSING NOTE
"Chief Complaint   Patient presents with     Recheck Medication       Initial LMP 09/27/2009 Estimated body mass index is 45.77 kg/(m^2) as calculated from the following:    Height as of 2/22/17: 4' 11\" (1.499 m).    Weight as of 3/10/17: 226 lb 9.6 oz (102.8 kg).  Medication Reconciliation: complete   Jennifer Harris CMA    "

## 2017-03-17 ENCOUNTER — HOSPITAL ENCOUNTER (OUTPATIENT)
Dept: PHYSICAL THERAPY | Facility: CLINIC | Age: 52
Setting detail: THERAPIES SERIES
End: 2017-03-17
Attending: PHYSICIAN ASSISTANT
Payer: COMMERCIAL

## 2017-03-17 PROCEDURE — 97110 THERAPEUTIC EXERCISES: CPT | Mod: GP | Performed by: PHYSICAL THERAPIST

## 2017-03-17 PROCEDURE — 97140 MANUAL THERAPY 1/> REGIONS: CPT | Mod: GP | Performed by: PHYSICAL THERAPIST

## 2017-03-17 PROCEDURE — 40000718 ZZHC STATISTIC PT DEPARTMENT ORTHO VISIT: Performed by: PHYSICAL THERAPIST

## 2017-03-20 ENCOUNTER — OFFICE VISIT (OUTPATIENT)
Dept: ORTHOPEDICS | Facility: CLINIC | Age: 52
End: 2017-03-20
Payer: COMMERCIAL

## 2017-03-20 VITALS — BODY MASS INDEX: 47.09 KG/M2 | HEIGHT: 59 IN | WEIGHT: 233.6 LBS

## 2017-03-20 DIAGNOSIS — Z98.890 S/P ARTHROSCOPY OF SHOULDER: Primary | ICD-10-CM

## 2017-03-20 PROCEDURE — 99024 POSTOP FOLLOW-UP VISIT: CPT | Performed by: ORTHOPAEDIC SURGERY

## 2017-03-20 ASSESSMENT — PAIN SCALES - GENERAL: PAINLEVEL: EXTREME PAIN (9)

## 2017-03-20 NOTE — PATIENT INSTRUCTIONS
Please remember to call and schedule a follow up appointment if one was recommended at your earliest convenience.  Orthopedics CLINIC HOURS TELEPHONE NUMBER   Dr. Dwayne Desai  Certified Medical Assistant   Monday & Wednesday   8am - 5pm  Thursday 1pm - 5pm  Friday 8am -11:30am Specialty schedulers:   [phone] to schedule your surgery.  Main Clinic:   [phone] to make an appointment with any provider.    Urgent Care locations:    [facility] Monday-Friday Closed  Saturday-Sunday 9am-5pm      Monday-Friday 12pm - 8pm  Saturday-Sunday 9am-5pm [phone]     If SURGERY has been recommended, please call our Specialty Schedulers at [phone] to schedule your procedure.    If you need a medication refill, please contact your pharmacy. Please allow 3 business days for your refill to be completed.    If an MRI or CT scan has been recommended, please call [facility] Schedulers at [phone] to schedule your appointment.  Use Linkdex (secure e-mail communication and access to your chart) to send a message or to make an appointment. Please ask how you can sign up for Linkdex.  Your care team's suggested websites for health information:   [email or web address] : Up to date and easily searchable information on multiple topics.   [email or web address] : [place] dept of heat, public health issues in [place], N1N1

## 2017-03-20 NOTE — NURSING NOTE
"Chief Complaint   Patient presents with     Surgical Followup     Right shoulder arthroscopy, cuff debridement, biceps tenodesis. DOS: 2/7/17. 6 week post op.        Initial Ht 4' 11\" (1.499 m)  Wt 233 lb 9.6 oz (106 kg)  LMP 09/27/2009  BMI 47.18 kg/m2 Estimated body mass index is 47.18 kg/(m^2) as calculated from the following:    Height as of this encounter: 4' 11\" (1.499 m).    Weight as of this encounter: 233 lb 9.6 oz (106 kg).  Medication Reconciliation: complete   Duran Kumar PA-C, CAQ (Ortho)  Supervising Physician: Patel Rice M.D., M.S.  Dept. of Orthopaedic Surgery  [facility]      "

## 2017-03-20 NOTE — PROGRESS NOTES
chief complaint:   Chief Complaint   Patient presents with     Surgical Followup     Right shoulder arthroscopy, cuff debridement, biceps tenodesis. DOS: 2/7/17. 6 week post op. lots of pain, particularily in the morning and in the evening. a couple weeks ago she felt a pop in the shoulder and pain has been worse since then.        SURGERY: Right shoulder arthroscopy ( [facility] ).  1. Right shoulder arthroscopic proximal biceps tenodesis   2. Right shoulder arthroscopic distal clavical resection   3. Right shoulder arthroscopic labral debridement   4. Right shoulder arthroscopic partial thickness rotator cuff debridement  5. Right shoulder arthroscopic subacromial decompression with partial acromioplasty    DATE OF SURGERY: 2/7/2017.    HISTORY OF PRESENT ILLNESS:  Bia Bill is a 51 year old female seen for postoperative evaluation of a right shoulder arthroscopy with proximal biceps tenodesis, distal clavical resection, labral debridement, rotator cuff debridement, and subacromial decompression for chronic right shoulder impingement syndrome, acromialclavicular arthritis and a low-grade partial thickness tearing of the anterior supraspinatus with type 2 SLAP tear. Surgery occurred 6 weeks ago. She returns today stating her shoulder is still very painful. She rates her pain as a 9/10. Diffuse pain. Pain is most severe in the morning and in the evening. She reports she popped her shoulder at Physical Therapy and has been painful since that time.     Recall she was 10/10 pain preop, and understands there may be more to her pain than findings within the shoulder and that surgery may not alleviate her pain.      OR Findings: Proximal biceps tendinosis with longitudinal striational tearing. Type 2 SLAP tear. Partial thickness tear at the deep fibers of the subscapularis without full-thickness tear. Glenohumeral synovitis. Intact articular-sided rotator cuff. Low grade partial thickness bursal sided  rotator cuff tear, less than 30% far anterolateral supraspinatus. Prominent subacromial and subclavicular bone spurs with a tight arthritic acromioclavicular joint. Thickened subacromial bursa.    Past medical history:   Past Medical History   Diagnosis Date     Anxiety state, unspecified      Bipolar I disorder, most recent episode (or current) unspecified      Depressive disorder, not elsewhere classified      DVT, lower extremity (H)      Polycythemia, secondary      [place] HGB        Past surgical history:   Past Surgical History   Procedure Laterality Date     Lithotripsy  2004     Lithotrypsy     Biliary stent single use cov       3 stints in left leg     D & c  10/26/09     with uterine ablation     Hysterectomy, pap no longer indicated  1-4-2010     Esophagoscopy, gastroscopy, duodenoscopy (egd), combined  4/21/2014     Procedure: Gastroscopy;  Surgeon: Moris Thomas MD;  Location: [facility]     Bone marrow biopsy, bone specimen, needle/trocar N/A 11/17/2014     Procedure: BIOPSY BONE MARROW;  Surgeon: Hay Aaron MD;  Location: [facility]     Medications:   Current Outpatient Prescriptions:      simvastatin (ZOCOR) 20 MG tablet, Take 1 tablet (20 mg) by mouth At Bedtime, Disp: 30 tablet, Rfl: 0     gabapentin (NEURONTIN) 600 MG tablet, Take 1 tablet (600 mg) by mouth 3 times daily, Disp: 90 tablet, Rfl: 1     HYDROcodone-acetaminophen (NORCO) 5-325 MG per tablet, Take 1-2 tablets by mouth every 8 hours as needed for moderate to severe pain maximum 6 tablet(s) per day, Disp: 30 tablet, Rfl: 0     mometasone-formoterol (DULERA) 100-5 MCG/ACT oral inhaler, Inhale 2 puffs into the lungs 2 times daily, Disp: 13 g, Rfl: 1     levothyroxine (SYNTHROID/LEVOTHROID) 150 MCG tablet, Take 1 tablet (150 mcg) by mouth daily, Disp: 90 tablet, Rfl: 1     ARIPiprazole (ABILIFY) 5 MG tablet, Take 1.5 tablets (7.5 mg) by mouth At Bedtime, Disp: 45 tablet, Rfl: 0     methocarbamol (ROBAXIN) 500 MG tablet, Take  "500 mg by mouth, Disp: , Rfl:      ketorolac (TORADOL) 10 MG tablet, Take 1 tablet (10 mg) by mouth every 6 hours as needed, Disp: 20 tablet, Rfl: 0     order for DME, Equipment being ordered: CPAP AIRSENSE 10 5-18 CM H20 # [id number]   DN# [id number], Disp: , Rfl:      warfarin (COUMADIN) 5 MG tablet, as directed by Anticoagulation Clinic, current dose 15 mg Mon, Fri, 10 mg rest of week, Disp: 65 tablet, Rfl: 11     traZODone (DESYREL) 150 MG tablet, Take 1 tablet (150 mg) by mouth At Bedtime, Disp: 90 tablet, Rfl: 1     lamoTRIgine (LAMICTAL) 200 MG tablet, Take 1 tablet (200 mg) by mouth daily, Disp: 60 tablet, Rfl: 1     allopurinol (ZYLOPRIM) 100 MG tablet, Take 1 tablet (100 mg) by mouth daily, Disp: 90 tablet, Rfl: 2     VITAMIN D3 1000 UNITS tablet, Take 1 tablet by mouth daily, Disp: , Rfl:      furosemide (LASIX) 20 MG tablet, Take 1 tablet (20 mg) by mouth 2 times daily, Disp: 180 tablet, Rfl: 2     buPROPion (WELLBUTRIN XL) 150 MG 24 hr tablet, Take 1 tablet (150 mg) by mouth every morning, Disp: 90 tablet, Rfl: 3     order for DME, Equipment being ordered: [id number]\" x2pair   \"20-30mmHg [facility] Hybrid Liners\" x 2 pair, Disp: 2 Units, Rfl: 11     EPINEPHrine (EPIPEN) 0.3 MG/0.3ML injection, Inject 0.3 mLs (0.3 mg) into the muscle once as needed for anaphylaxis, Disp: 0.6 mL, Rfl: 0     ipratropium - albuterol 0.5 mg/2.5 mg/3 mL (DUONEB) 0.5-2.5 (3) MG/3ML nebulization, Take 1 vial (3 mLs) by nebulization every 6 hours as needed for shortness of breath / dyspnea or wheezing USE THIS INSTEAD OF ALBUTEROL INHALER AS NEEDED FOR WHEEZE COUGH OR SHORTNESS OF BREATH, Disp: 30 vial, Rfl: 1     omeprazole (PRILOSEC) 20 MG capsule, Take 1 capsule (20 mg) by mouth 2 times daily, Disp: 180 capsule, Rfl: 3     albuterol (PROAIR HFA, PROVENTIL HFA, VENTOLIN HFA) 108 (90 BASE) MCG/ACT inhaler, Inhale 2 puffs into the lungs every 6 hours as needed for shortness of breath / dyspnea or wheezing, Disp: 3 Inhaler, Rfl: 1    " " order for DME, Equipment being ordered: DEPENDS SIZE LARGE, Disp: 60 Month, Rfl: 5     order for DME, Equipment being ordered:   \"[id number]\" x2pair  \"20-30mmHg [place] Hybrid Liners\" x1pair, Disp: 2 Device, Rfl: 11     order for DME, Equipment being ordered: INCONTINENCE PADS  QID, Disp: 1 Month, Rfl: 11     order for DME, Equipment being ordered: CPAP supplies needed, Disp: 3 Month, Rfl: 3     albuterol (2.5 MG/3ML) 0.083% nebulizer solution, Take 1 vial (2.5 mg) by nebulization every 6 hours as needed for shortness of breath / dyspnea or wheezing, Disp: 1 Box, Rfl: 0     acetaminophen (TYLENOL) 500 MG tablet, Take 500-1,000 mg by mouth every 6 hours as needed for mild pain, Disp: , Rfl:      ORDER FOR DME, Equipment being ordered: CPAP, Disp: 1 Units, Rfl: 11     ASPIRIN NOT PRESCRIBED, INTENTIONAL,, by Other route continuous prn., Disp: , Rfl: 0    Allergies:   Allergies   Allergen Reactions     Percocet [Codeine] Anaphylaxis     Asa [Aspirin] Hives     Aspirin causes seizures and hives, throat swelling       Bee      Darvocet [Acetaminophen] Anaphylaxis     Darvocet,Percocet      Lyrica [Pregabalin] Swelling     dizziness     Percocet [Oxycodone-Acetaminophen] Swelling     Iodine Rash     Reaction to topical betadine     Tape [Adhesive Tape] Rash     This document serves as a record of the services and decisions personally performed and made by Patel Rice MD. It was created on his behalf by Licha Nicole, a trained medical scribe. The creation of this document is based the provider's statements to the medical scribe.    Scribe Licha Nicole 2:27 PM 3/20/2017     REVIEW OF SYSTEMS:   CONSTITUTIONAL:NEGATIVE for fever, chills, night sweats  INTEGUMENTARY/SKIN: NEGATIVE for worrisome wound problems or redness.  MUSCULOSKELETAL:See HPI above  NEURO: NEGATIVE for weakness, dizziness or paresthesias    PHYSICAL EXAM:  Ht 1.499 m (4' 11\")  Wt 106 kg (233 lb 9.6 oz)  LMP 09/27/2009  BMI 47.18 kg/m2   GENERAL " APPEARANCE: healthy, alert, no distress  SKIN: no suspicious lesions or rashes  NEURO: Normal strength and tone, mentation intact and speech normal  PSYCH:  mentation appears normal and affect normal, not anxious  RESPIRATORY: No increased work of breathing.    right UPPER EXTREMITY:  Sensation intact to light touch in median, radial, ulnar and axillary nerve distributions  Palpable 2+ radial pulse, brisk capillary refill to all fingers, wwp  Intact epl fpl fdp edc wrist flexion/extension biceps triceps deltoid    right SHOULDER:  Shoulder Inspection: no swelling, no ecchymosis, no erythema, no maceration, no deformity  Tender: diffuse tenderness in arm and shoulder  Range of Motion: limited by discomfort.  Strength: grossly intact, weak, limited by discomfort    X-RAY: none reviewed.      Impression: Bia Bill is a 51 year old female 6 weeks status post right shoulder arthroscopy and proximal biceps tenodesis, distal clavical resection, labral debridement, rotator cuff debridement, and subacromial decompression.     Plan: routine postoperative shoulder arthroscopy protocol  * WB status: light weight bearing per comfort.  * discontinue sling.  * Activity: avoid aggravating activities.  * Rest.  * Ice twice daily to three times daily.  * PT reordered for strengthening, stretching and range of motion exercises.  * Tylenol as needed for pain  * return to clinic 6 weeks, sooner as needed.    The information in this document, created by a scribe for me, accurately reflects the services I personally performed and the decisions made by me. I have reviewed and approved this document for accuracy.      Patel Rice M.D., M.S.  Dept. of Orthopaedic Surgery  [facility]

## 2017-03-20 NOTE — LETTER
3/20/2017       RE: Bia Bill  [address]  [place] [facility] [place] [address]           Dear Colleague,    Thank you for referring your patient, Bia Bill, to the [facility]. Please see a copy of my visit note below.    chief complaint:   Chief Complaint   Patient presents with     Surgical Followup     Right shoulder arthroscopy, cuff debridement, biceps tenodesis. DOS: 2/7/17. 6 week post op. lots of pain, particularily in the morning and in the evening. a couple weeks ago she felt a pop in the shoulder and pain has been worse since then.        SURGERY: Right shoulder arthroscopy ( [facility] ).  1. Right shoulder arthroscopic proximal biceps tenodesis   2. Right shoulder arthroscopic distal clavical resection   3. Right shoulder arthroscopic labral debridement   4. Right shoulder arthroscopic partial thickness rotator cuff debridement  5. Right shoulder arthroscopic subacromial decompression with partial acromioplasty    DATE OF SURGERY: 2/7/2017.    HISTORY OF PRESENT ILLNESS:  Bia Bill is a 51 year old female seen for postoperative evaluation of a right shoulder arthroscopy with proximal biceps tenodesis, distal clavical resection, labral debridement, rotator cuff debridement, and subacromial decompression for chronic right shoulder impingement syndrome, acromialclavicular arthritis and a low-grade partial thickness tearing of the anterior supraspinatus with type 2 SLAP tear. Surgery occurred 6 weeks ago. She returns today stating her shoulder is still very painful. She rates her pain as a 9/10. Diffuse pain. Pain is most severe in the morning and in the evening. She reports she popped her shoulder at Physical Therapy and has been painful since that time.     Recall she was 10/10 pain preop, and understands there may be more to her pain than findings within the shoulder and that surgery may not alleviate her pain.      OR Findings:  Proximal biceps tendinosis with longitudinal striational tearing. Type 2 SLAP tear. Partial thickness tear at the deep fibers of the subscapularis without full-thickness tear. Glenohumeral synovitis. Intact articular-sided rotator cuff. Low grade partial thickness bursal sided rotator cuff tear, less than 30% far anterolateral supraspinatus. Prominent subacromial and subclavicular bone spurs with a tight arthritic acromioclavicular joint. Thickened subacromial bursa.    Past medical history:   Past Medical History   Diagnosis Date     Anxiety state, unspecified      Bipolar I disorder, most recent episode (or current) unspecified      Depressive disorder, not elsewhere classified      DVT, lower extremity (H)      Polycythemia, secondary      [place] HGB        Past surgical history:   Past Surgical History   Procedure Laterality Date     Lithotripsy  2004     Lithotrypsy     Biliary stent single use cov       3 stints in left leg     D & c  10/26/09     with uterine ablation     Hysterectomy, pap no longer indicated  1-4-2010     Esophagoscopy, gastroscopy, duodenoscopy (egd), combined  4/21/2014     Procedure: Gastroscopy;  Surgeon: Moris Thomas MD;  Location: [facility]     Bone marrow biopsy, bone specimen, needle/trocar N/A 11/17/2014     Procedure: BIOPSY BONE MARROW;  Surgeon: Hay Aaron MD;  Location: [facility]     Medications:   Current Outpatient Prescriptions:      simvastatin (ZOCOR) 20 MG tablet, Take 1 tablet (20 mg) by mouth At Bedtime, Disp: 30 tablet, Rfl: 0     gabapentin (NEURONTIN) 600 MG tablet, Take 1 tablet (600 mg) by mouth 3 times daily, Disp: 90 tablet, Rfl: 1     HYDROcodone-acetaminophen (NORCO) 5-325 MG per tablet, Take 1-2 tablets by mouth every 8 hours as needed for moderate to severe pain maximum 6 tablet(s) per day, Disp: 30 tablet, Rfl: 0     mometasone-formoterol (DULERA) 100-5 MCG/ACT oral inhaler, Inhale 2 puffs into the lungs 2 times daily, Disp: 13 g, Rfl: 1     " levothyroxine (SYNTHROID/LEVOTHROID) 150 MCG tablet, Take 1 tablet (150 mcg) by mouth daily, Disp: 90 tablet, Rfl: 1     ARIPiprazole (ABILIFY) 5 MG tablet, Take 1.5 tablets (7.5 mg) by mouth At Bedtime, Disp: 45 tablet, Rfl: 0     methocarbamol (ROBAXIN) 500 MG tablet, Take 500 mg by mouth, Disp: , Rfl:      ketorolac (TORADOL) 10 MG tablet, Take 1 tablet (10 mg) by mouth every 6 hours as needed, Disp: 20 tablet, Rfl: 0     order for DME, Equipment being ordered: CPAP AIRSENSE 10 5-18 CM H20 SN# [id number]   DN# [id number], Disp: , Rfl:      warfarin (COUMADIN) 5 MG tablet, as directed by Anticoagulation Clinic, current dose 15 mg Mon, Fri, 10 mg rest of week, Disp: 65 tablet, Rfl: 11     traZODone (DESYREL) 150 MG tablet, Take 1 tablet (150 mg) by mouth At Bedtime, Disp: 90 tablet, Rfl: 1     lamoTRIgine (LAMICTAL) 200 MG tablet, Take 1 tablet (200 mg) by mouth daily, Disp: 60 tablet, Rfl: 1     allopurinol (ZYLOPRIM) 100 MG tablet, Take 1 tablet (100 mg) by mouth daily, Disp: 90 tablet, Rfl: 2     VITAMIN D3 1000 UNITS tablet, Take 1 tablet by mouth daily, Disp: , Rfl:      furosemide (LASIX) 20 MG tablet, Take 1 tablet (20 mg) by mouth 2 times daily, Disp: 180 tablet, Rfl: 2     buPROPion (WELLBUTRIN XL) 150 MG 24 hr tablet, Take 1 tablet (150 mg) by mouth every morning, Disp: 90 tablet, Rfl: 3     order for DME, Equipment being ordered: [id number]\" x2pair   \"20-30mmHg [facility] Hybrid Liners\" x 2 pair, Disp: 2 Units, Rfl: 11     EPINEPHrine (EPIPEN) 0.3 MG/0.3ML injection, Inject 0.3 mLs (0.3 mg) into the muscle once as needed for anaphylaxis, Disp: 0.6 mL, Rfl: 0     ipratropium - albuterol 0.5 mg/2.5 mg/3 mL (DUONEB) 0.5-2.5 (3) MG/3ML nebulization, Take 1 vial (3 mLs) by nebulization every 6 hours as needed for shortness of breath / dyspnea or wheezing USE THIS INSTEAD OF ALBUTEROL INHALER AS NEEDED FOR WHEEZE COUGH OR SHORTNESS OF BREATH, Disp: 30 vial, Rfl: 1     omeprazole (PRILOSEC) 20 MG capsule, Take 1 " "capsule (20 mg) by mouth 2 times daily, Disp: 180 capsule, Rfl: 3     albuterol (PROAIR HFA, PROVENTIL HFA, VENTOLIN HFA) 108 (90 BASE) MCG/ACT inhaler, Inhale 2 puffs into the lungs every 6 hours as needed for shortness of breath / dyspnea or wheezing, Disp: 3 Inhaler, Rfl: 1     order for DME, Equipment being ordered: DEPENDS SIZE LARGE, Disp: 60 Month, Rfl: 5     order for DME, Equipment being ordered:   \"[id number]\" x2pair  \"20-30mmHg [place] Hybrid Liners\" x1pair, Disp: 2 Device, Rfl: 11     order for DME, Equipment being ordered: INCONTINENCE PADS  QID, Disp: 1 Month, Rfl: 11     order for DME, Equipment being ordered: CPAP supplies needed, Disp: 3 Month, Rfl: 3     albuterol (2.5 MG/3ML) 0.083% nebulizer solution, Take 1 vial (2.5 mg) by nebulization every 6 hours as needed for shortness of breath / dyspnea or wheezing, Disp: 1 Box, Rfl: 0     acetaminophen (TYLENOL) 500 MG tablet, Take 500-1,000 mg by mouth every 6 hours as needed for mild pain, Disp: , Rfl:      ORDER FOR DME, Equipment being ordered: CPAP, Disp: 1 Units, Rfl: 11     ASPIRIN NOT PRESCRIBED, INTENTIONAL,, by Other route continuous prn., Disp: , Rfl: 0    Allergies:   Allergies   Allergen Reactions     Percocet [Codeine] Anaphylaxis     Asa [Aspirin] Hives     Aspirin causes seizures and hives, throat swelling       Bee      Darvocet [Acetaminophen] Anaphylaxis     Darvocet,Percocet      Lyrica [Pregabalin] Swelling     dizziness     Percocet [Oxycodone-Acetaminophen] Swelling     Iodine Rash     Reaction to topical betadine     Tape [Adhesive Tape] Rash     This document serves as a record of the services and decisions personally performed and made by Patel Rice MD. It was created on his behalf by Licha Nicole, a trained medical scribe. The creation of this document is based the provider's statements to the medical scribe.    Selvin Nicole 2:27 PM 3/20/2017     REVIEW OF SYSTEMS:   CONSTITUTIONAL:NEGATIVE for fever, chills, night " "sweats  INTEGUMENTARY/SKIN: NEGATIVE for worrisome wound problems or redness.  MUSCULOSKELETAL:See HPI above  NEURO: NEGATIVE for weakness, dizziness or paresthesias    PHYSICAL EXAM:  Ht 1.499 m (4' 11\")  Wt 106 kg (233 lb 9.6 oz)  LMP 09/27/2009  BMI 47.18 kg/m2   GENERAL APPEARANCE: healthy, alert, no distress  SKIN: no suspicious lesions or rashes  NEURO: Normal strength and tone, mentation intact and speech normal  PSYCH:  mentation appears normal and affect normal, not anxious  RESPIRATORY: No increased work of breathing.    right UPPER EXTREMITY:  Sensation intact to light touch in median, radial, ulnar and axillary nerve distributions  Palpable 2+ radial pulse, brisk capillary refill to all fingers, wwp  Intact epl fpl fdp edc wrist flexion/extension biceps triceps deltoid    right SHOULDER:  Shoulder Inspection: no swelling, no ecchymosis, no erythema, no maceration, no deformity  Tender: diffuse tenderness in arm and shoulder  Range of Motion: limited by discomfort.  Strength: grossly intact, weak, limited by discomfort    X-RAY: none reviewed.      Impression: Bia Bill is a 51 year old female 6 weeks status post right shoulder arthroscopy and proximal biceps tenodesis, distal clavical resection, labral debridement, rotator cuff debridement, and subacromial decompression.     Plan: routine postoperative shoulder arthroscopy protocol  * WB status: light weight bearing per comfort.  * discontinue sling.  * Activity: avoid aggravating activities.  * Rest.  * Ice twice daily to three times daily.  * PT reordered for strengthening, stretching and range of motion exercises.  * Tylenol as needed for pain  * return to clinic 6 weeks, sooner as needed.    The information in this document, created by a scribe for me, accurately reflects the services I personally performed and the decisions made by me. I have reviewed and approved this document for accuracy.      Patel Rice M.D., M.S.  Dept. of " Orthopaedic Surgery  [facility]      Again, thank you for allowing me to participate in the care of your patient.        Sincerely,    Patel Rice MD

## 2017-03-21 ENCOUNTER — HOSPITAL ENCOUNTER (OUTPATIENT)
Dept: PHYSICAL THERAPY | Facility: CLINIC | Age: 52
Setting detail: THERAPIES SERIES
End: 2017-03-21
Attending: PHYSICIAN ASSISTANT
Payer: COMMERCIAL

## 2017-03-21 PROCEDURE — 97110 THERAPEUTIC EXERCISES: CPT | Mod: GP | Performed by: PHYSICAL THERAPIST

## 2017-03-21 PROCEDURE — 97140 MANUAL THERAPY 1/> REGIONS: CPT | Mod: GP | Performed by: PHYSICAL THERAPIST

## 2017-03-21 PROCEDURE — 40000718 ZZHC STATISTIC PT DEPARTMENT ORTHO VISIT: Performed by: PHYSICAL THERAPIST

## 2017-03-24 ENCOUNTER — HOSPITAL ENCOUNTER (OUTPATIENT)
Dept: MAMMOGRAPHY | Facility: CLINIC | Age: 52
End: 2017-03-24
Attending: PHYSICIAN ASSISTANT
Payer: COMMERCIAL

## 2017-03-24 ENCOUNTER — HOSPITAL ENCOUNTER (OUTPATIENT)
Dept: LAB | Facility: CLINIC | Age: 52
Discharge: HOME | End: 2017-03-24
Attending: INTERNAL MEDICINE | Admitting: INTERNAL MEDICINE
Payer: COMMERCIAL

## 2017-03-24 ENCOUNTER — ANTICOAGULATION THERAPY VISIT (OUTPATIENT)
Dept: ANTICOAGULATION | Facility: CLINIC | Age: 52
End: 2017-03-24
Payer: COMMERCIAL

## 2017-03-24 ENCOUNTER — ONCOLOGY VISIT (OUTPATIENT)
Dept: ONCOLOGY | Facility: CLINIC | Age: 52
End: 2017-03-24
Attending: INTERNAL MEDICINE
Payer: COMMERCIAL

## 2017-03-24 VITALS
SYSTOLIC BLOOD PRESSURE: 102 MMHG | BODY MASS INDEX: 47.24 KG/M2 | HEIGHT: 59 IN | OXYGEN SATURATION: 95 % | HEART RATE: 83 BPM | RESPIRATION RATE: 18 BRPM | DIASTOLIC BLOOD PRESSURE: 63 MMHG | TEMPERATURE: 98.8 F | WEIGHT: 234.3 LBS

## 2017-03-24 DIAGNOSIS — Z79.01 LONG TERM CURRENT USE OF ANTICOAGULANT THERAPY: ICD-10-CM

## 2017-03-24 DIAGNOSIS — Z12.31 ENCOUNTER FOR SCREENING MAMMOGRAM FOR BREAST CANCER: ICD-10-CM

## 2017-03-24 DIAGNOSIS — D75.1 POLYCYTHEMIA, SECONDARY: ICD-10-CM

## 2017-03-24 DIAGNOSIS — D75.1 ERYTHROCYTOSIS: ICD-10-CM

## 2017-03-24 DIAGNOSIS — F17.200 SMOKER: Primary | ICD-10-CM

## 2017-03-24 DIAGNOSIS — I73.9 PERIPHERAL VASCULAR DISEASE WITH CLAUDICATION (H): ICD-10-CM

## 2017-03-24 DIAGNOSIS — I73.9 PVD (PERIPHERAL VASCULAR DISEASE) WITH CLAUDICATION (H): ICD-10-CM

## 2017-03-24 DIAGNOSIS — I82.409 DVT (DEEP VENOUS THROMBOSIS) (H): ICD-10-CM

## 2017-03-24 LAB
BASOPHILS # BLD AUTO: 0 10E9/L (ref 0–0.2)
BASOPHILS NFR BLD AUTO: 0.6 %
DIFFERENTIAL METHOD BLD: ABNORMAL
EOSINOPHIL # BLD AUTO: 0.1 10E9/L (ref 0–0.7)
EOSINOPHIL NFR BLD AUTO: 1.2 %
ERYTHROCYTE [DISTWIDTH] IN BLOOD BY AUTOMATED COUNT: 20.6 % (ref 10–15)
HCT VFR BLD AUTO: 57.3 % (ref 35–47)
HGB BLD-MCNC: 18.2 G/DL (ref 11.7–15.7)
IMM GRANULOCYTES # BLD: 0 10E9/L (ref 0–0.4)
IMM GRANULOCYTES NFR BLD: 0.2 %
INR PPP: 1.7 (ref 0.86–1.14)
LYMPHOCYTES # BLD AUTO: 1.4 10E9/L (ref 0.8–5.3)
LYMPHOCYTES NFR BLD AUTO: 26.4 %
MCH RBC QN AUTO: 28.2 PG (ref 26.5–33)
MCHC RBC AUTO-ENTMCNC: 31.8 G/DL (ref 31.5–36.5)
MCV RBC AUTO: 89 FL (ref 78–100)
MONOCYTES # BLD AUTO: 0.3 10E9/L (ref 0–1.3)
MONOCYTES NFR BLD AUTO: 6.6 %
NEUTROPHILS # BLD AUTO: 3.4 10E9/L (ref 1.6–8.3)
NEUTROPHILS NFR BLD AUTO: 65 %
PLATELET # BLD AUTO: 115 10E9/L (ref 150–450)
RBC # BLD AUTO: 6.45 10E12/L (ref 3.8–5.2)
WBC # BLD AUTO: 5.2 10E9/L (ref 4–11)

## 2017-03-24 PROCEDURE — 99214 OFFICE O/P EST MOD 30 MIN: CPT | Performed by: INTERNAL MEDICINE

## 2017-03-24 PROCEDURE — 99207 ZZC NO CHARGE NURSE ONLY: CPT | Performed by: REGISTERED NURSE

## 2017-03-24 PROCEDURE — 99211 OFF/OP EST MAY X REQ PHY/QHP: CPT

## 2017-03-24 PROCEDURE — 85610 PROTHROMBIN TIME: CPT | Performed by: FAMILY MEDICINE

## 2017-03-24 PROCEDURE — 85025 COMPLETE CBC W/AUTO DIFF WBC: CPT | Performed by: INTERNAL MEDICINE

## 2017-03-24 PROCEDURE — 36415 COLL VENOUS BLD VENIPUNCTURE: CPT | Performed by: INTERNAL MEDICINE

## 2017-03-24 PROCEDURE — G0202 SCR MAMMO BI INCL CAD: HCPCS

## 2017-03-24 ASSESSMENT — ANXIETY QUESTIONNAIRES
5. BEING SO RESTLESS THAT IT IS HARD TO SIT STILL: SEVERAL DAYS
3. WORRYING TOO MUCH ABOUT DIFFERENT THINGS: SEVERAL DAYS
7. FEELING AFRAID AS IF SOMETHING AWFUL MIGHT HAPPEN: NOT AT ALL
1. FEELING NERVOUS, ANXIOUS, OR ON EDGE: SEVERAL DAYS
2. NOT BEING ABLE TO STOP OR CONTROL WORRYING: SEVERAL DAYS
GAD7 TOTAL SCORE: 6
6. BECOMING EASILY ANNOYED OR IRRITABLE: SEVERAL DAYS

## 2017-03-24 ASSESSMENT — PAIN SCALES - GENERAL: PAINLEVEL: EXTREME PAIN (8)

## 2017-03-24 ASSESSMENT — PATIENT HEALTH QUESTIONNAIRE - PHQ9: 5. POOR APPETITE OR OVEREATING: SEVERAL DAYS

## 2017-03-24 NOTE — MR AVS SNAPSHOT
Bia S Fly   3/24/2017   Anticoagulation Therapy Visit    Description:  51 year old female   Provider:  Eula Batres, RN   Department:  [facility]           INR as of 3/24/2017     Today's INR 1.70!      Anticoagulation Summary as of 3/24/2017     INR goal 2.0-3.0   Today's INR 1.70!   Full instructions 3/24: 17.5 mg; Otherwise 15 mg on Mon, Wed, Fri; 10 mg all other days   Next INR check 4/3/2017    Indications   Long term current use of anticoagulant therapy [Z79.01]  DVT (deep venous thrombosis) (H) [I82.409]  PVD (peripheral vascular disease) with claudication (H) [I73.9]         Description     Warfarin dose:17.5mg today then increase to 15mg MWF and 10mg the rest of the days of the week.        March 2017 Details    Sun Mon Tue Wed Thu Fri Sat        1               2               3               4                 5               6               7               8               9               10               11                 12               13               14               15               16               17               18                 19               20               21               22               23               24      17.5 mg   See details      25      10 mg           26      10 mg         27      15 mg         28      10 mg         29      15 mg         30      10 mg         31      15 mg           Date Details   03/24 This INR check               How to take your warfarin dose     To take:  10 mg Take 2 of the 5 mg tablets.    To take:  15 mg Take 3 of the 5 mg tablets.    To take:  17.5 mg Take 3.5 of the 5 mg tablets.           April 2017 Details    Sun Mon Tue Wed Thu Fri Sat           1      10 mg           2      10 mg         3            4               5               6               7               8                 9               10               11               12               13               14               15                 16               17                18               19               20               21               22                 23               24               25               26               27               28               29                 30                      Date Details   No additional details    Date of next INR:  4/3/2017         How to take your warfarin dose     To take:  10 mg Take 2 of the 5 mg tablets.    To take:  15 mg Take 3 of the 5 mg tablets.

## 2017-03-24 NOTE — ASSESSMENT & PLAN NOTE
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 She was previously seen by Dr. Lomax on 10/09/2011, but has been lost from followup. The patient has had erythrocytosis with a hemoglobin around 20-23 and hemoglobin and hematocrit of 60-65% for most of her life. She had a previous workup by Dr. Lomax and also by another hematologist at [facility]. Review of her previous tests and workup showed a hemoglobin electrophoresis on 01/17/2007 that showed a hemoglobin A1 of 52%, A2 at 0, hemoglobin F at 1.4% and other hemoglobin 46.2%. JAK2 was negative on 07/31/2007. Hemoglobin affinity testing on 07/31/2007 showed a P50 of 27 (normal between 25 and 29). Repeat hemoglobin electrophoresis on 10/04/2011 showed the hemoglobin was at 39.9%. This illuminated hemoglobin was suggestive of beta globin variant of hemoglobin Hb [place]. She was also seen by Dr. Peres in 2007 and most of the workup is from there. Erythropoietin level was in the range of the 80s. Vitamin B12 and folic acid were within normal limits. Abdomen ultrasound did not show hepatosplenomegaly, but there is fatty infiltration of the liver. According to the chart, the patient had phlebotomies done in the past to keep her hemoglobin below 20 g/dl. This was done over the summertime of 2011. Most recently she didn't have any phlebotomy. She was started on anticoagulation with Coumadin as she was felt to be at high risk for blood clots and given history of severe arterial disease and stroke in the past. The patient is safe to use her anticoagulation and her most recent INR was 1.74. Evidently her carbon monoxide is also elevated. She is an active smoker, but she is cutting down from 2 packs per day to  3 cigarettes a day over the last one month. Patient denies any DVT or PE. She also evidently claims that her mother and her first son are also having the same hemoglobinopathy and are being followed by their physicians. Bone marrow biopsy on 11/17/2014 showed no clonal chromosomal abnormality and no cytogenetic evidence of a malignant process. She also tested negative for the JAK2 mutation. Leukemia lymphoma evaluation additionally showed no aberrant immunophenotype on T cells and no increase in or abnormal appearing myeloid blasts.

## 2017-03-24 NOTE — NURSING NOTE
"Bia Bill is a 51 year old female who presents for:  Chief Complaint   Patient presents with     Hematology     Follow up erythrocytosis/anemia. Mammogram apt today at 1:15pm.         Initial Vitals:  /63 (BP Location: Left arm, Patient Position: Chair, Cuff Size: Adult Large)  Pulse 83  Temp 98.8  F (37.1  C) (Tympanic)  Resp 18  Ht 1.499 m (4' 11\")  Wt 106.3 kg (234 lb 4.8 oz)  LMP 09/27/2009  SpO2 95%  Breastfeeding? No  BMI 47.32 kg/m2 Estimated body mass index is 47.32 kg/(m^2) as calculated from the following:    Height as of this encounter: 1.499 m (4' 11\").    Weight as of this encounter: 106.3 kg (234 lb 4.8 oz).. Body surface area is 2.1 meters squared. BP completed using cuff size: large  Extreme Pain (8) Patient's last menstrual period was 09/27/2009. Allergies and medications reviewed.     Medications: Medication refills not needed today.  Pharmacy name entered into [employer]:    [place], [place] - [address] [id number] - [address]    Comments: Follow up erythrocytosis/anemia. Mammogram apt today at 1:15pm.     PCP:  Dustin Leong MD  Patient reports these concerns today:  Nausea / Vomiting - no  Constipation - no  Diarrhea  - no  Pain - 8/10 right shoulder recently had surgery about a month ago by Lila Rice MD.   Fever / Chills - no  Cough -  Yes non-productive.   Rash - no  Fatigue - no  Other - none.            10 minutes for nursing intake (face to face time)   Janice Gonzalez CMA      "

## 2017-03-24 NOTE — PROGRESS NOTES
ANTICOAGULATION FOLLOW-UP CLINIC VISIT    Patient Name:  Bia Bill  Date:  3/24/2017  Contact Type:  Telephone/ LM    SUBJECTIVE:     Patient Findings     Positives Unexplained INR or factor level change    Comments Left a non detailed message for Norah at the [phone] number because it doesn't have her name on the voicemail, left a more detailed message on the phone number listed in the demographics page.           OBJECTIVE    INR   Date Value Ref Range Status   03/24/2017 1.70 (H) 0.86 - 1.14 Final     Comment:     Special tube used to correct for high hematocrit       ASSESSMENT / PLAN  INR assessment SUB    Recheck INR In: 10 DAYS    INR Location Outside lab      Anticoagulation Summary as of 3/24/2017     INR goal 2.0-3.0   Today's INR 1.70!   Maintenance plan 15 mg (5 mg x 3) on Mon, Wed, Fri; 10 mg (5 mg x 2) all other days   Full instructions 3/24: 17.5 mg; Otherwise 15 mg on Mon, Wed, Fri; 10 mg all other days   Weekly total 85 mg   Plan last modified Eula Batres, RN (3/24/2017)   Next INR check 4/3/2017   Priority INR   Target end date Indefinite    Indications   Long term current use of anticoagulant therapy [Z79.01]  DVT (deep venous thrombosis) (H) [I82.409]  PVD (peripheral vascular disease) with claudication (H) [I73.9]         Anticoagulation Episode Summary     INR check location     Preferred lab     Send INR reminders to [place] PHONE ANTICO [facility]    Comments * lab draw due to elevated hematocrit (fingerstick meters don't work). LEFT LE. STENT x 3 LEFT UPPER LEG. Previous arterial clot. Bridging instructions for 2/7/17 surgery in [facility] note from 1/19/17.  2/27/17 CURRENT PHONE # [phone]      Anticoagulation Care Providers     Provider Role Specialty Phone number    Kd Leong MD [facility] [phone]            See the Encounter Report to view Anticoagulation Flowsheet and Dosing Calendar (Go to Encounters tab in chart review, and find the  Anticoagulation Therapy Visit)        Eula Batres RN

## 2017-03-24 NOTE — PROGRESS NOTES
Hematology/ Oncology Follow-up Visit:  Mar 24, 2017    Reason for Visit:   Chief Complaint   Patient presents with     Hematology     Follow up erythrocytosis/anemia. Mammogram apt today at 1:15pm.        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 She was previously seen by Dr. Lomax on 10/09/2011, but has been lost from followup. The patient has had erythrocytosis with a hemoglobin around 20-23 and hemoglobin and hematocrit of 60-65% for most of her life. She had a previous workup by Dr. Lomax and also by another hematologist at [facility]. Review of her previous tests and workup showed a hemoglobin electrophoresis on 01/17/2007 that showed a hemoglobin A1 of 52%, A2 at 0, hemoglobin F at 1.4% and other hemoglobin 46.2%. JAK2 was negative on 07/31/2007. Hemoglobin affinity testing on 07/31/2007 showed a P50 of 27 (normal between 25 and 29). Repeat hemoglobin electrophoresis on 10/04/2011 showed the hemoglobin was at 39.9%. This illuminated hemoglobin was suggestive of beta globin variant of hemoglobin Hb [place]. She was also seen by Dr. Peres in 2007 and most of the workup is from there. Erythropoietin level was in the range of the 80s. Vitamin B12 and folic acid were within normal limits. Abdomen ultrasound did not show hepatosplenomegaly, but there is fatty infiltration of the liver. According to the chart, the patient had phlebotomies done in the past to keep her hemoglobin below 20 g/dl. This was done over the summertime of 2011. Most recently she didn't have any phlebotomy. She was started on anticoagulation with Coumadin as she was felt to be at high risk for blood clots and given history of severe  arterial disease and stroke in the past. The patient is safe to use her anticoagulation and her most recent INR was 1.74. Evidently her carbon monoxide is also elevated. She is an active smoker, but she is cutting down from 2 packs per day to 3 cigarettes a day over the last one month. Patient denies any DVT or PE. She also evidently claims that her mother and her first son are also having the same hemoglobinopathy and are being followed by their physicians. Bone marrow biopsy on 11/17/2014 showed no clonal chromosomal abnormality and no cytogenetic evidence of a malignant process. She also tested negative for the JAK2 mutation. Leukemia lymphoma evaluation additionally showed no aberrant immunophenotype on T cells and no increase in or abnormal appearing myeloid blasts.       Interval History:  Patient is here today for follow-up. She denies any shortness of breath or cough or wheezing. She continues to smoke about half a pack a day.She had shoulder surgery without any significant side effects. She is currently on anticoagulation with Coumadin. INR today is subtherapeutic.    Review Of Systems:  Constitutional: Negative for fever, chills, and night sweats.  Skin: negative.  Eyes: negative.  Ears/Nose/Throat: negative.  Respiratory: No shortness of breath, dyspnea on exertion, cough, or hemoptysis.  Cardiovascular: negative.  Gastrointestinal: negative.  Genitourinary: negative.  Musculoskeletal: negative.  Neurologic: negative.  Psychiatric: negative.  Hematologic/Lymphatic/Immunologic: negative.  Endocrine: negative.    All other ROS negative unless mentioned in interval history.    Past medical, social, surgical, and family histories reviewed.    Allergies:  Allergies as of 03/24/2017 - Rylan as Reviewed 03/24/2017   Allergen Reaction Noted     Darvocet [propoxyphene n-apap] Anaphylaxis 07/26/2006     Percocet [codeine] Anaphylaxis 01/16/2007     Percocet [oxycodone-acetaminophen] Swelling, Anaphylaxis, and Hives  06/01/2016     Asa [aspirin] Hives 07/10/2006     Aspirin Hives 01/25/2017     Bee  03/26/2010     Lyrica [pregabalin] Swelling and Unknown 12/04/2015     Iodine Rash 08/03/2006     Povidone iodine Rash 01/25/2017     Tape [adhesive tape] Rash 08/03/2006       Current Medications:  Current Outpatient Prescriptions   Medication Sig Dispense Refill     simvastatin (ZOCOR) 20 MG tablet Take 1 tablet (20 mg) by mouth At Bedtime 30 tablet 0     gabapentin (NEURONTIN) 600 MG tablet Take 1 tablet (600 mg) by mouth 3 times daily 90 tablet 1     HYDROcodone-acetaminophen (NORCO) 5-325 MG per tablet Take 1-2 tablets by mouth every 8 hours as needed for moderate to severe pain maximum 6 tablet(s) per day 30 tablet 0     mometasone-formoterol (DULERA) 100-5 MCG/ACT oral inhaler Inhale 2 puffs into the lungs 2 times daily 13 g 1     levothyroxine (SYNTHROID/LEVOTHROID) 150 MCG tablet Take 1 tablet (150 mcg) by mouth daily 90 tablet 1     ARIPiprazole (ABILIFY) 5 MG tablet Take 1.5 tablets (7.5 mg) by mouth At Bedtime 45 tablet 0     methocarbamol (ROBAXIN) 500 MG tablet Take 500 mg by mouth       ketorolac (TORADOL) 10 MG tablet Take 1 tablet (10 mg) by mouth every 6 hours as needed 20 tablet 0     order for DME Equipment being ordered: CPAP  AIRSENSE 10  5-18 CM H20  SN# [id number]   DN# [id number]       warfarin (COUMADIN) 5 MG tablet as directed by Anticoagulation Clinic, current dose 15 mg Mon, Fri, 10 mg rest of week 65 tablet 11     traZODone (DESYREL) 150 MG tablet Take 1 tablet (150 mg) by mouth At Bedtime 90 tablet 1     lamoTRIgine (LAMICTAL) 200 MG tablet Take 1 tablet (200 mg) by mouth daily 60 tablet 1     allopurinol (ZYLOPRIM) 100 MG tablet Take 1 tablet (100 mg) by mouth daily 90 tablet 2     VITAMIN D3 1000 UNITS tablet Take 1 tablet by mouth daily       furosemide (LASIX) 20 MG tablet Take 1 tablet (20 mg) by mouth 2 times daily 180 tablet 2     buPROPion (WELLBUTRIN XL) 150 MG 24 hr tablet Take 1 tablet (150 mg) by  "mouth every morning 90 tablet 3     order for DME Equipment being ordered: [id number]\" x2pair     \"20-30mmHg [employer] Hybrid Liners\" x 2 pair 2 Units 11     ipratropium - albuterol 0.5 mg/2.5 mg/3 mL (DUONEB) 0.5-2.5 (3) MG/3ML nebulization Take 1 vial (3 mLs) by nebulization every 6 hours as needed for shortness of breath / dyspnea or wheezing USE THIS INSTEAD OF ALBUTEROL INHALER AS NEEDED FOR WHEEZE COUGH OR SHORTNESS OF BREATH 30 vial 1     omeprazole (PRILOSEC) 20 MG capsule Take 1 capsule (20 mg) by mouth 2 times daily 180 capsule 3     albuterol (PROAIR HFA, PROVENTIL HFA, VENTOLIN HFA) 108 (90 BASE) MCG/ACT inhaler Inhale 2 puffs into the lungs every 6 hours as needed for shortness of breath / dyspnea or wheezing 3 Inhaler 1     order for DME Equipment being ordered: DEPENDS SIZE LARGE 60 Month 5     order for DME Equipment being ordered:     \"[id number]\" x2pair    \"20-30mmHg [employer] Hybrid Liners\" x1pair 2 Device 11     order for DME Equipment being ordered: INCONTINENCE PADS   QID 1 Month 11     order for DME Equipment being ordered: CPAP supplies needed 3 Month 3     albuterol (2.5 MG/3ML) 0.083% nebulizer solution Take 1 vial (2.5 mg) by nebulization every 6 hours as needed for shortness of breath / dyspnea or wheezing 1 Box 0     acetaminophen (TYLENOL) 500 MG tablet Take 500-1,000 mg by mouth every 6 hours as needed for mild pain       ORDER FOR DME Equipment being ordered: CPAP 1 Units 11     EPINEPHrine (EPIPEN) 0.3 MG/0.3ML injection Inject 0.3 mLs (0.3 mg) into the muscle once as needed for anaphylaxis (Patient not taking: Reported on 3/24/2017) 0.6 mL 0     ASPIRIN NOT PRESCRIBED, INTENTIONAL, by Other route continuous prn Reported on 3/24/2017  0        Physical Exam:  /63 (BP Location: Left arm, Patient Position: Chair, Cuff Size: Adult Large)  Pulse 83  Temp 98.8  F (37.1  C) (Tympanic)  Resp 18  Ht 1.499 m (4' 11\")  Wt 106.3 kg (234 lb 4.8 oz)  LMP 09/27/2009  SpO2 95%  " Breastfeeding? No  BMI 47.32 kg/m2  Wt Readings from Last 12 Encounters:   03/24/17 106.3 kg (234 lb 4.8 oz)   03/20/17 106 kg (233 lb 9.6 oz)   03/16/17 104.1 kg (229 lb 8 oz)   03/10/17 102.8 kg (226 lb 9.6 oz)   02/22/17 101.3 kg (223 lb 6.4 oz)   02/06/17 102.3 kg (225 lb 9.6 oz)   02/03/17 101.9 kg (224 lb 9.6 oz)   02/01/17 103.2 kg (227 lb 8 oz)   01/12/17 102.4 kg (225 lb 12.8 oz)   12/29/16 100.8 kg (222 lb 3.2 oz)   11/15/16 103 kg (227 lb)   09/22/16 105.6 kg (232 lb 11.2 oz)     GENERAL APPEARANCE: Healthy, alert and in no acute distress.  HEENT: Sclerae anicteric. PERRLA. Oropharynx without ulcers, lesions, or thrush.  NECK: Supple. No asymmetry or masses.  LYMPHATICS: No palpable cervical, supraclavicular, axillary, or inguinal lymphadenopathy.  RESP: Lungs clear to auscultation bilaterally without rales, rhonchi or wheezes.  CARDIOVASCULAR: Regular rate and rhythm. Normal S1, S2; no S3 or S4. No murmur, gallop, or rub.  ABDOMEN: Soft, nontender. Bowel sounds normal. No palpable organomegaly or masses.  MUSCULOSKELETAL: Extremities without gross deformities noted. No edema of bilateral lower extremities.  SKIN: No suspicious lesions or rashes.  NEURO: Alert and oriented x 3. Cranial nerves II-XII grossly intact.  PSYCHIATRIC: Mentation and affect appear normal.    Laboratory/Imaging Studies:  Orders Only on 03/15/2017   Component Date Value Ref Range Status     INR 03/15/2017 1.82* 0.86 - 1.14 Final    Special tube used to correct for high hematocrit     WBC 03/15/2017 4.5  4.0 - 11.0 10e9/L Final     RBC Count 03/15/2017 6.37* 3.8 - 5.2 10e12/L Final     Hemoglobin 03/15/2017 17.8* 11.7 - 15.7 g/dL Final     Hematocrit 03/15/2017 56.7* 35.0 - 47.0 % Final     MCV 03/15/2017 89  78 - 100 fl Final     MCH 03/15/2017 27.9  26.5 - 33.0 pg Final     MCHC 03/15/2017 31.4* 31.5 - 36.5 g/dL Final     RDW 03/15/2017 20.6* 10.0 - 15.0 % Final     Platelet Count 03/15/2017 94* 150 - 450 10e9/L Final     Diff  Method 03/15/2017 Automated Method   Final     % Neutrophils 03/15/2017 61.2  % Final     % Lymphocytes 03/15/2017 28.1  % Final     % Monocytes 03/15/2017 8.7  % Final     % Eosinophils 03/15/2017 1.1  % Final     % Basophils 03/15/2017 0.7  % Final     % Immature Granulocytes 03/15/2017 0.2  % Final     Absolute Neutrophil 03/15/2017 2.8  1.6 - 8.3 10e9/L Final     Absolute Lymphocytes 03/15/2017 1.3  0.8 - 5.3 10e9/L Final     Absolute Monocytes 03/15/2017 0.4  0.0 - 1.3 10e9/L Final     Absolute Eosinophils 03/15/2017 0.1  0.0 - 0.7 10e9/L Final     Absolute Basophils 03/15/2017 0.0  0.0 - 0.2 10e9/L Final     Abs Immature Granulocytes 03/15/2017 0.0  0 - 0.4 10e9/L Final     Cholesterol 03/15/2017 154  <200 mg/dL Final     Triglycerides 03/15/2017 172* <150 mg/dL Final    Comment: Borderline high:  150-199 mg/dl   High:             200-499 mg/dl   Very high:       >499 mg/dl       HDL Cholesterol 03/15/2017 30* >49 mg/dL Final     LDL Cholesterol Calculated 03/15/2017 90  <100 mg/dL Final    Desirable:       <100 mg/dl     Non HDL Cholesterol 03/15/2017 124  <130 mg/dL Final     TSH 03/15/2017 2.79  0.40 - 4.00 mU/L Final        Recent Results (from the past 744 hour(s))   XR Shoulder Right G/E 3 Views    Narrative    RIGHT SHOULDER THREE OR MORE VIEWS 3/10/2017 10:47 AM     HISTORY: Pain in right shoulder.      Impression    IMPRESSION: Attenuation of the distal clavicular head, new since  1/12/2017, presumably related to distal clavicular osteotomy.  Acromioplasty is also suggested. The shoulder otherwise appears within  normal limits.    SANJANA GARCIA MD       Assessment and plan:    (D75.1) Erythrocytosis  I reviewed with the patient today the results from CBC. Hematocrit is elevated however the patient continues to be asymptomatic. We will see the patient again in 6 months or sooner if there is new developments or concerns.    History of thromboembolic disease  Patient to long-term  anticoagulation  Patient INR today is 1.5. Patient has been subtherapeutic over the last few visits. Patient has been followed by anticoagulation clinic.    Smoking  Again I emphasized the importance of quitting smoking..    The patient is ready to learn, no apparent learning barriers were identified.  Diagnosis and treatment plans were explained to the patient. The patient expressed understanding of the content. The patient asked appropriate questions. The patient questions were answered to her satisfaction.    Chart documentation with Dragon Voice recognition Software. Although reviewed after completion, some words and grammatical errors may remain.

## 2017-03-24 NOTE — PATIENT INSTRUCTIONS
We would like to see you back in clinic with Dr. Watts in 6 months with standing lab work.  Copy of appointments, and after visit summary (AVS) given to patient.  If you have any questions during business hours (M-F 8 AM- 4PM), please call Deepthi Condon RN, BSN, OCN Oncology Hematology /Breast Cancer Navigator at [facility] [phone].   For questions after business hours, or on holidays/weekends, please call our after hours Nurse Triage line [phone]. Thank you.

## 2017-03-24 NOTE — MR AVS SNAPSHOT
After Visit Summary   3/24/2017    Bia Bill    MRN: [id number]           Patient Information     Date Of Birth          1965        Visit Information        Provider Department      3/24/2017 2:30 PM Joshua Watts MD [facility] [place] [facility]      Today's Diagnoses     Smoker    -  1    Erythrocytosis           Follow-ups after your visit        Follow-up notes from your care team     Return in about 6 months (around 9/24/2017) for Standing blood work.      Your next 10 appointments already scheduled     Mar 24, 2017  2:30 PM CDT   Return Visit with Joshua Watts MD   [facility] ([facility])    [facility]  [address]  [facility] [address]   [phone]            Mar 28, 2017  1:00 PM CDT   Treatment with Orquidea Orellana PT   [facility] ([facility])    [address] [place] [address]   [phone]            Mar 29, 2017  9:15 AM CDT   Return Visit with LEONIDES Calderon [facility] ([facility])    [address] [address]   [phone]            Apr 03, 2017  9:30 AM CDT   Return Visit with Kd Morris   [facility] ([facility])    [address] [address]   [phone]            Apr 03, 2017 10:30 AM CDT   LAB with [facility] ([facility])    [address] [address]   [phone]           Patient must bring picture ID.  Patient should be prepared to give a urine specimen  Please do not eat 10-12 hours before your appointment if you are coming in fasting for labs on lipids, cholesterol, or glucose (sugar).  Pregnant women should follow their Care Team instructions. Water with medications is okay. Do not drink coffee or other fluids.   If you have concerns about taking  your  medications, please ask at office or if scheduling via [employer], send a message by clicking on Secure Messaging, Message Your Care Team.            Apr 18, 2017  3:00 PM CDT   Return Visit with Kd Morris   [facility] ([facility])    [address]  [facility] [address]   [phone]            Apr 27, 2017  2:00 PM CDT   Return Sleep Patient with Patel Bazzi MD   [facility] ([facility])    [address]  [facility] [address]   [phone]            May 01, 2017  1:30 PM CDT   Return Visit with Patel Rice MD   [facility] [place] ([facility] [place])    [address]  [place] [address]   [phone]            May 03, 2017 10:05 AM CDT   LAB with [facility] ([facility])    [address] [address]   [phone]           Patient must bring picture ID.  Patient should be prepared to give a urine specimen  Please do not eat 10-12 hours before your appointment if you are coming in fasting for labs on lipids, cholesterol, or glucose (sugar).  Pregnant women should follow their Care Team instructions. Water with medications is okay. Do not drink coffee or other fluids.   If you have concerns about taking  your medications, please ask at office or if scheduling via [employer], send a message by clicking on Secure Messaging, Message Your Care Team.            Jun 07, 2017 10:00 AM CDT   LAB with [facility] ([facility])    [address] [address]   [phone]           Patient must bring picture ID.  Patient should be prepared to give a urine specimen  Please do not eat 10-12 hours before your appointment if you are coming in fasting for labs on lipids, cholesterol, or glucose (sugar).  Pregnant women should follow their Care Team instructions.  "Water with medications is okay. Do not drink coffee or other fluids.   If you have concerns about taking  your medications, please ask at office or if scheduling via Edamam, send a message by clicking on Secure Messaging, Message Your Care Team.              Who to contact     If you have questions or need follow up information about today's clinic visit or your schedule please contact [facility] directly at [phone].  Normal or non-critical lab and imaging results will be communicated to you by TransC[employer] bioServiceshart, letter or phone within 4 business days after the clinic has received the results. If you do not hear from us within 7 days, please contact the clinic through [employer]icest or phone. If you have a critical or abnormal lab result, we will notify you by phone as soon as possible.  Submit refill requests through Edamam or call your pharmacy and they will forward the refill request to us. Please allow 3 business days for your refill to be completed.          Additional Information About Your Visit        Edamam Information     [employer] lets you send messages to your doctor, view your test results, renew your prescriptions, schedule appointments and more. To sign up, go to [email or web address] . Click on \"Log in\" on the left side of the screen, which will take you to the Welcome page. Then click on \"Sign up Now\" on the right side of the page.     You will be asked to enter the access code listed below, as well as some personal information. Please follow the directions to create your username and password.     Your access code is: [email or web address]  Expires: 3/29/2017  3:13 PM     Your access code will  in 90 days. If you need help or a new code, please call your [place] clinic or [phone].        Care EveryWhere ID     This is your Care EveryWhere ID. This could be used by other organizations to access your [place] medical records  [id number]        Your Vitals Were     Pulse Temperature Respirations " "Height Last Period Pulse Oximetry    83 98.8  F (37.1  C) (Tympanic) 18 1.499 m (4' 11\") 09/27/2009 95%    Breastfeeding? BMI (Body Mass Index)                No 47.32 kg/m2           Blood Pressure from Last 3 Encounters:   03/24/17 102/63   03/16/17 128/64   03/10/17 114/64    Weight from Last 3 Encounters:   03/24/17 106.3 kg (234 lb 4.8 oz)   03/20/17 106 kg (233 lb 9.6 oz)   03/16/17 104.1 kg (229 lb 8 oz)              Today, you had the following     No orders found for display       Primary Care Provider Office Phone # Fax #    Kd Leong [phone]       [facility] [address] [facility] [address]        Thank you!     Thank you for choosing [facility]  for your care. Our goal is always to provide you with excellent care. Hearing back from our patients is one way we can continue to improve our services. Please take a few minutes to complete the written survey that you may receive in the mail after your visit with us. Thank you!             Your Updated Medication List - Protect others around you: Learn how to safely use, store and throw away your medicines at www.disposemymeds.org.          This list is accurate as of: 3/24/17  2:17 PM.  Always use your most recent med list.                   Brand Name Dispense Instructions for use    acetaminophen 500 MG tablet    TYLENOL     Take 500-1,000 mg by mouth every 6 hours as needed for mild pain       * albuterol (2.5 MG/3ML) 0.083% neb solution     1 Box    Take 1 vial (2.5 mg) by nebulization every 6 hours as needed for shortness of breath / dyspnea or wheezing       * albuterol 108 (90 BASE) MCG/ACT Inhaler    PROAIR HFA/PROVENTIL HFA/VENTOLIN HFA    3 Inhaler    Inhale 2 puffs into the lungs every 6 hours as needed for shortness of breath / dyspnea or wheezing       allopurinol 100 MG tablet    ZYLOPRIM    90 tablet    Take 1 tablet (100 mg) by mouth daily       ARIPiprazole 5 MG tablet    ABILIFY    45 tablet "    Take 1.5 tablets (7.5 mg) by mouth At Bedtime       * ASPIRIN NOT PRESCRIBED    INTENTIONAL     by Other route continuous prn Reported on 3/24/2017       buPROPion 150 MG 24 hr tablet    WELLBUTRIN XL    90 tablet    Take 1 tablet (150 mg) by mouth every morning       cholecalciferol 1000 UNIT tablet    vitamin D     Take 1 tablet by mouth daily       EPINEPHrine 0.3 MG/0.3ML injection     0.6 mL    Inject 0.3 mLs (0.3 mg) into the muscle once as needed for anaphylaxis       furosemide 20 MG tablet    LASIX    180 tablet    Take 1 tablet (20 mg) by mouth 2 times daily       gabapentin 600 MG tablet    NEURONTIN    90 tablet    Take 1 tablet (600 mg) by mouth 3 times daily       HYDROcodone-acetaminophen 5-325 MG per tablet    NORCO    30 tablet    Take 1-2 tablets by mouth every 8 hours as needed for moderate to severe pain maximum 6 tablet(s) per day       ipratropium - albuterol 0.5 mg/2.5 mg/3 mL 0.5-2.5 (3) MG/3ML neb solution    DUONEB    30 vial    Take 1 vial (3 mLs) by nebulization every 6 hours as needed for shortness of breath / dyspnea or wheezing USE THIS INSTEAD OF ALBUTEROL INHALER AS NEEDED FOR WHEEZE COUGH OR SHORTNESS OF BREATH       ketorolac 10 MG tablet    TORADOL    20 tablet    Take 1 tablet (10 mg) by mouth every 6 hours as needed       lamoTRIgine 200 MG tablet    LaMICtal    60 tablet    Take 1 tablet (200 mg) by mouth daily       levothyroxine 150 MCG tablet    SYNTHROID/LEVOTHROID    90 tablet    Take 1 tablet (150 mcg) by mouth daily       methocarbamol 500 MG tablet    ROBAXIN     Take 500 mg by mouth       mometasone-formoterol 100-5 MCG/ACT oral inhaler    DULERA    13 g    Inhale 2 puffs into the lungs 2 times daily       omeprazole 20 MG CR capsule    priLOSEC    180 capsule    Take 1 capsule (20 mg) by mouth 2 times daily       * order for DME      Equipment being ordered: CPAP AIRSENSE 10 5-18 CM H20 SN# [id number]   DN# [id number]       * order for DME     1 Units    Equipment being  "ordered: CPAP       * order for DME     3 Month    Equipment being ordered: CPAP supplies needed       * order for DME     1 Month    Equipment being ordered: INCONTINENCE PADS  QID       * order for DME     60 Month    Equipment being ordered: DEPENDS SIZE LARGE       * order for DME     2 Device    Equipment being ordered:   \"[id number]\" x2pair  \"20-30mmHg Farrow Hybrid Liners\" x1pair       * order for DME     2 Units    Equipment being ordered: OshomaOffy3460\" x2pair   \"20-30mmHg Farrow Hybrid Liners\" x 2 pair       simvastatin 20 MG tablet    ZOCOR    30 tablet    Take 1 tablet (20 mg) by mouth At Bedtime       traZODone 150 MG tablet    DESYREL    90 tablet    Take 1 tablet (150 mg) by mouth At Bedtime       warfarin 5 MG tablet    COUMADIN    65 tablet    as directed by Anticoagulation Clinic, current dose 15 mg Mon, Fri, 10 mg rest of week       * Notice:  This list has 10 medication(s) that are the same as other medications prescribed for you. Read the directions carefully, and ask your doctor or other care provider to review them with you.      "

## 2017-03-25 ASSESSMENT — PATIENT HEALTH QUESTIONNAIRE - PHQ9: SUM OF ALL RESPONSES TO PHQ QUESTIONS 1-9: 8

## 2017-03-25 ASSESSMENT — ANXIETY QUESTIONNAIRES: GAD7 TOTAL SCORE: 6

## 2017-03-28 ENCOUNTER — HOSPITAL ENCOUNTER (OUTPATIENT)
Dept: PHYSICAL THERAPY | Facility: CLINIC | Age: 52
Setting detail: THERAPIES SERIES
End: 2017-03-28
Attending: PHYSICIAN ASSISTANT
Payer: COMMERCIAL

## 2017-03-28 PROCEDURE — 40000718 ZZHC STATISTIC PT DEPARTMENT ORTHO VISIT: Performed by: PHYSICAL THERAPIST

## 2017-03-28 PROCEDURE — 97140 MANUAL THERAPY 1/> REGIONS: CPT | Mod: GP | Performed by: PHYSICAL THERAPIST

## 2017-03-28 PROCEDURE — 97110 THERAPEUTIC EXERCISES: CPT | Mod: GP | Performed by: PHYSICAL THERAPIST

## 2017-03-29 ENCOUNTER — OFFICE VISIT (OUTPATIENT)
Dept: PSYCHIATRY | Facility: CLINIC | Age: 52
End: 2017-03-29
Payer: COMMERCIAL

## 2017-03-29 VITALS
BODY MASS INDEX: 45.85 KG/M2 | SYSTOLIC BLOOD PRESSURE: 144 MMHG | HEART RATE: 76 BPM | WEIGHT: 227 LBS | TEMPERATURE: 96.4 F | DIASTOLIC BLOOD PRESSURE: 75 MMHG

## 2017-03-29 DIAGNOSIS — F43.10 PTSD (POST-TRAUMATIC STRESS DISORDER): Primary | ICD-10-CM

## 2017-03-29 DIAGNOSIS — I82.409 DVT (DEEP VENOUS THROMBOSIS) (H): Primary | ICD-10-CM

## 2017-03-29 DIAGNOSIS — F33.1 MAJOR DEPRESSIVE DISORDER, RECURRENT EPISODE, MODERATE (H): ICD-10-CM

## 2017-03-29 DIAGNOSIS — F31.81 BIPOLAR 2 DISORDER (H): ICD-10-CM

## 2017-03-29 DIAGNOSIS — Z79.01 LONG TERM CURRENT USE OF ANTICOAGULANT THERAPY: ICD-10-CM

## 2017-03-29 PROCEDURE — 99214 OFFICE O/P EST MOD 30 MIN: CPT | Performed by: CLINICAL NURSE SPECIALIST

## 2017-03-29 RX ORDER — LAMOTRIGINE 200 MG/1
200 TABLET ORAL DAILY
Qty: 60 TABLET | Refills: 3 | Status: SHIPPED | OUTPATIENT
Start: 2017-03-29 | End: 2017-05-30

## 2017-03-29 RX ORDER — WARFARIN SODIUM 5 MG/1
TABLET ORAL
Qty: 220 TABLET | Refills: 0 | Status: SHIPPED | OUTPATIENT
Start: 2017-03-29 | End: 2017-05-03

## 2017-03-29 RX ORDER — ARIPIPRAZOLE 5 MG/1
7.5 TABLET ORAL AT BEDTIME
Qty: 45 TABLET | Refills: 3 | Status: SHIPPED | OUTPATIENT
Start: 2017-03-29 | End: 2017-05-30

## 2017-03-29 ASSESSMENT — ANXIETY QUESTIONNAIRES
5. BEING SO RESTLESS THAT IT IS HARD TO SIT STILL: MORE THAN HALF THE DAYS
GAD7 TOTAL SCORE: 10
3. WORRYING TOO MUCH ABOUT DIFFERENT THINGS: MORE THAN HALF THE DAYS
IF YOU CHECKED OFF ANY PROBLEMS ON THIS QUESTIONNAIRE, HOW DIFFICULT HAVE THESE PROBLEMS MADE IT FOR YOU TO DO YOUR WORK, TAKE CARE OF THINGS AT HOME, OR GET ALONG WITH OTHER PEOPLE: SOMEWHAT DIFFICULT
6. BECOMING EASILY ANNOYED OR IRRITABLE: MORE THAN HALF THE DAYS
2. NOT BEING ABLE TO STOP OR CONTROL WORRYING: SEVERAL DAYS
1. FEELING NERVOUS, ANXIOUS, OR ON EDGE: MORE THAN HALF THE DAYS
4. TROUBLE RELAXING: SEVERAL DAYS
7. FEELING AFRAID AS IF SOMETHING AWFUL MIGHT HAPPEN: NOT AT ALL

## 2017-03-29 NOTE — MR AVS SNAPSHOT
After Visit Summary   3/29/2017    Bia Bill    MRN: [id number]           Patient Information     Date Of Birth          1965        Visit Information        Provider Department      3/29/2017 9:15 AM Lisa Bai APRN [facility]        Today's Diagnoses     PTSD (post-traumatic stress disorder)    -  1    Major depressive disorder, recurrent episode, moderate (H)        Bipolar 2 disorder (H)           Follow-ups after your visit        Your next 10 appointments already scheduled     Apr 03, 2017  9:30 AM CDT   Return Visit with Kd Morris   [facility] ([facility])    [address] [address]   [phone]            Apr 03, 2017 10:30 AM CDT   LAB with [facility] ([facility])    [address] [address]   [phone]           Patient must bring picture ID.  Patient should be prepared to give a urine specimen  Please do not eat 10-12 hours before your appointment if you are coming in fasting for labs on lipids, cholesterol, or glucose (sugar).  Pregnant women should follow their Care Team instructions. Water with medications is okay. Do not drink coffee or other fluids.   If you have concerns about taking  your medications, please ask at office or if scheduling via [employer], send a message by clicking on Secure Messaging, Message Your Care Team.            Apr 07, 2017 12:30 PM CDT   Treatment with Orquidea Orellana PT   [facility] ([facility])    [address] [place] [address]   [phone]            Apr 18, 2017  3:00 PM CDT   Return Visit with Kd Morris   [facility] ([facility])    [address] [address]   [phone]            Apr 27, 2017  2:00 PM CDT   Return Sleep Patient with Patel Bazzi MD   [facility] ([facility])    [address]  [facility] [address]   [phone]            May 01, 2017  1:30 PM CDT   Return Visit with Patel Rice MD   [facility] [place] ([facility] [place])    [address]  [place] [address]   [phone]            May 03, 2017 10:05 AM CDT   LAB with [place] [facility]   [facility] ([facility])    [address] [facility] [address]   [phone]           Patient must bring picture ID.  Patient should be prepared to give a urine specimen  Please do not eat 10-12 hours before your appointment if you are coming in fasting for labs on lipids, cholesterol, or glucose (sugar).  Pregnant women should follow their Care Team instructions. Water with medications is okay. Do not drink coffee or other fluids.   If you have concerns about taking  your medications, please ask at office or if scheduling via [employer], send a message by clicking on Secure Messaging, Message Your Care Team.            Jun 07, 2017 10:00 AM CDT   LAB with [place] [facility]   [facility] ([facility])    [address] [facility] [address]   [phone]           Patient must bring picture ID.  Patient should be prepared to give a urine specimen  Please do not eat 10-12 hours before your appointment if you are coming in fasting for labs on lipids, cholesterol, or glucose (sugar).  Pregnant women should follow their Care Team instructions. Water with medications is okay. Do not drink coffee or other fluids.   If you have concerns about taking  your medications, please ask at office or if scheduling via [employer], send a message by clicking on Secure Messaging, Message Your Care Team.            Jul 05, 2017 10:00 AM CDT   LAB with [place] [facility]   [facility] ([facility])    [address] [facility] [address]   [phone]           Patient must  bring picture ID.  Patient should be prepared to give a urine specimen  Please do not eat 10-12 hours before your appointment if you are coming in fasting for labs on lipids, cholesterol, or glucose (sugar).  Pregnant women should follow their Care Team instructions. Water with medications is okay. Do not drink coffee or other fluids.   If you have concerns about taking  your medications, please ask at office or if scheduling via [employer], send a message by clicking on Secure Messaging, Message Your Care Team.            Aug 02, 2017 10:00 AM CDT   LAB with [place] [facility]   [facility] ([facility])    [address] [facility] [address]   [phone]           Patient must bring picture ID.  Patient should be prepared to give a urine specimen  Please do not eat 10-12 hours before your appointment if you are coming in fasting for labs on lipids, cholesterol, or glucose (sugar).  Pregnant women should follow their Care Team instructions. Water with medications is okay. Do not drink coffee or other fluids.   If you have concerns about taking  your medications, please ask at office or if scheduling via [employer], send a message by clicking on Secure Messaging, Message Your Care Team.              Who to contact     If you have questions or need follow up information about today's clinic visit or your schedule please contact [facility] directly at [phone].  Normal or non-critical lab and imaging results will be communicated to you by Vertrohart, letter or phone within 4 business days after the clinic has received the results. If you do not hear from us within 7 days, please contact the clinic through Vertrohart or phone. If you have a critical or abnormal lab result, we will notify you by phone as soon as possible.  Submit refill requests through Ocelus or call your pharmacy and they will forward the refill request to us. Please allow 3 business days for your refill to be  "completed.          Additional Information About Your Visit        Medical Connections[employer]igzon Information     [facility] lets you send messages to your doctor, view your test results, renew your prescriptions, schedule appointments and more. To sign up, go to [email or web address] . Click on \"Log in\" on the left side of the screen, which will take you to the Welcome page. Then click on \"Sign up Now\" on the right side of the page.     You will be asked to enter the access code listed below, as well as some personal information. Please follow the directions to create your username and password.     Your access code is: [email or web address]  Expires: 3/29/2017  3:13 PM     Your access code will  in 90 days. If you need help or a new code, please call your [place] clinic or [phone].        Care EveryWhere ID     This is your Care EveryWhere ID. This could be used by other organizations to access your [place] medical records  [id number]        Your Vitals Were     Pulse Temperature Last Period BMI (Body Mass Index)          76 96.4  F (35.8  C) (Tympanic) 2009 45.85 kg/m2         Blood Pressure from Last 3 Encounters:   17 144/75   17 102/63   17 128/64    Weight from Last 3 Encounters:   17 227 lb (103 kg)   17 234 lb 4.8 oz (106.3 kg)   17 233 lb 9.6 oz (106 kg)              Today, you had the following     No orders found for display         Where to get your medicines      These medications were sent to [id number] - [place], [place] - [address]  [address] [address]    Hours:  Closed 10-16-08 business to [facility] Phone:  [phone]     ARIPiprazole 5 MG tablet    lamoTRIgine 200 MG tablet          Primary Care Provider Office Phone # Fax #    Kd Leong [phone]       [facility] [address] [address]        Thank you!     Thank you for choosing [facility]  for your care. Our goal " is always to provide you with excellent care. Hearing back from our patients is one way we can continue to improve our services. Please take a few minutes to complete the written survey that you may receive in the mail after your visit with us. Thank you!             Your Updated Medication List - Protect others around you: Learn how to safely use, store and throw away your medicines at www.disposemymeds.org.          This list is accurate as of: 3/29/17  9:35 AM.  Always use your most recent med list.                   Brand Name Dispense Instructions for use    acetaminophen 500 MG tablet    TYLENOL     Take 500-1,000 mg by mouth every 6 hours as needed for mild pain       * albuterol (2.5 MG/3ML) 0.083% neb solution     1 Box    Take 1 vial (2.5 mg) by nebulization every 6 hours as needed for shortness of breath / dyspnea or wheezing       * albuterol 108 (90 BASE) MCG/ACT Inhaler    PROAIR HFA/PROVENTIL HFA/VENTOLIN HFA    3 Inhaler    Inhale 2 puffs into the lungs every 6 hours as needed for shortness of breath / dyspnea or wheezing       allopurinol 100 MG tablet    ZYLOPRIM    90 tablet    Take 1 tablet (100 mg) by mouth daily       ARIPiprazole 5 MG tablet    ABILIFY    45 tablet    Take 1.5 tablets (7.5 mg) by mouth At Bedtime       * ASPIRIN NOT PRESCRIBED    INTENTIONAL     by Other route continuous prn Reported on 3/24/2017       buPROPion 150 MG 24 hr tablet    WELLBUTRIN XL    90 tablet    Take 1 tablet (150 mg) by mouth every morning       cholecalciferol 1000 UNIT tablet    vitamin D     Take 1 tablet by mouth daily       EPINEPHrine 0.3 MG/0.3ML injection     0.6 mL    Inject 0.3 mLs (0.3 mg) into the muscle once as needed for anaphylaxis       furosemide 20 MG tablet    LASIX    180 tablet    Take 1 tablet (20 mg) by mouth 2 times daily       gabapentin 600 MG tablet    NEURONTIN    90 tablet    Take 1 tablet (600 mg) by mouth 3 times daily       HYDROcodone-acetaminophen 5-325 MG per tablet     "NORCO    30 tablet    Take 1-2 tablets by mouth every 8 hours as needed for moderate to severe pain maximum 6 tablet(s) per day       ipratropium - albuterol 0.5 mg/2.5 mg/3 mL 0.5-2.5 (3) MG/3ML neb solution    DUONEB    30 vial    Take 1 vial (3 mLs) by nebulization every 6 hours as needed for shortness of breath / dyspnea or wheezing USE THIS INSTEAD OF ALBUTEROL INHALER AS NEEDED FOR WHEEZE COUGH OR SHORTNESS OF BREATH       ketorolac 10 MG tablet    TORADOL    20 tablet    Take 1 tablet (10 mg) by mouth every 6 hours as needed       lamoTRIgine 200 MG tablet    LaMICtal    60 tablet    Take 1 tablet (200 mg) by mouth daily       levothyroxine 150 MCG tablet    SYNTHROID/LEVOTHROID    90 tablet    Take 1 tablet (150 mcg) by mouth daily       methocarbamol 500 MG tablet    ROBAXIN     Take 500 mg by mouth       mometasone-formoterol 100-5 MCG/ACT oral inhaler    DULERA    13 g    Inhale 2 puffs into the lungs 2 times daily       omeprazole 20 MG CR capsule    priLOSEC    180 capsule    Take 1 capsule (20 mg) by mouth 2 times daily       * order for DME      Equipment being ordered: CPAP AIRSENSE 10 5-18 CM H20 SN# [id number]   DN# [id number]       * order for DME     1 Units    Equipment being ordered: CPAP       * order for DME     3 Month    Equipment being ordered: CPAP supplies needed       * order for DME     1 Month    Equipment being ordered: INCONTINENCE PADS  QID       * order for DME     60 Month    Equipment being ordered: DEPENDS SIZE LARGE       * order for DME     2 Device    Equipment being ordered:   \"[id number]\" x2pair  \"20-30mmHg Farrow Hybrid Liners\" x1pair       * order for DME     2 Units    Equipment being ordered: [id number]\" x2pair   \"20-30mmHg Farrow Hybrid Liners\" x 2 pair       simvastatin 20 MG tablet    ZOCOR    30 tablet    Take 1 tablet (20 mg) by mouth At Bedtime       traZODone 150 MG tablet    DESYREL    90 tablet    Take 1 tablet (150 mg) by mouth At Bedtime       warfarin 5 " MG tablet    COUMADIN    65 tablet    as directed by Anticoagulation Clinic, current dose 15 mg Mon, Fri, 10 mg rest of week       * Notice:  This list has 10 medication(s) that are the same as other medications prescribed for you. Read the directions carefully, and ask your doctor or other care provider to review them with you.

## 2017-03-29 NOTE — PROGRESS NOTES
Psychiatric Progress Note    Name: Bia Bill  Date: 3/29/2017  Length of Visit: 30 minutes  MRN: [id number]      Current Outpatient Prescriptions   Medication Sig     simvastatin (ZOCOR) 20 MG tablet Take 1 tablet (20 mg) by mouth At Bedtime     gabapentin (NEURONTIN) 600 MG tablet Take 1 tablet (600 mg) by mouth 3 times daily     HYDROcodone-acetaminophen (NORCO) 5-325 MG per tablet Take 1-2 tablets by mouth every 8 hours as needed for moderate to severe pain maximum 6 tablet(s) per day     mometasone-formoterol (DULERA) 100-5 MCG/ACT oral inhaler Inhale 2 puffs into the lungs 2 times daily     levothyroxine (SYNTHROID/LEVOTHROID) 150 MCG tablet Take 1 tablet (150 mcg) by mouth daily     ARIPiprazole (ABILIFY) 5 MG tablet Take 1.5 tablets (7.5 mg) by mouth At Bedtime     methocarbamol (ROBAXIN) 500 MG tablet Take 500 mg by mouth     ketorolac (TORADOL) 10 MG tablet Take 1 tablet (10 mg) by mouth every 6 hours as needed     warfarin (COUMADIN) 5 MG tablet as directed by Anticoagulation Clinic, current dose 15 mg Mon, Fri, 10 mg rest of week     traZODone (DESYREL) 150 MG tablet Take 1 tablet (150 mg) by mouth At Bedtime     lamoTRIgine (LAMICTAL) 200 MG tablet Take 1 tablet (200 mg) by mouth daily     allopurinol (ZYLOPRIM) 100 MG tablet Take 1 tablet (100 mg) by mouth daily     VITAMIN D3 1000 UNITS tablet Take 1 tablet by mouth daily     furosemide (LASIX) 20 MG tablet Take 1 tablet (20 mg) by mouth 2 times daily     buPROPion (WELLBUTRIN XL) 150 MG 24 hr tablet Take 1 tablet (150 mg) by mouth every morning     ipratropium - albuterol 0.5 mg/2.5 mg/3 mL (DUONEB) 0.5-2.5 (3) MG/3ML nebulization Take 1 vial (3 mLs) by nebulization every 6 hours as needed for shortness of breath / dyspnea or wheezing USE THIS INSTEAD OF ALBUTEROL INHALER AS NEEDED FOR WHEEZE COUGH OR SHORTNESS OF BREATH     omeprazole (PRILOSEC) 20 MG capsule Take 1 capsule (20 mg) by mouth 2 times daily     albuterol (PROAIR HFA,  "PROVENTIL HFA, VENTOLIN HFA) 108 (90 BASE) MCG/ACT inhaler Inhale 2 puffs into the lungs every 6 hours as needed for shortness of breath / dyspnea or wheezing     albuterol (2.5 MG/3ML) 0.083% nebulizer solution Take 1 vial (2.5 mg) by nebulization every 6 hours as needed for shortness of breath / dyspnea or wheezing     acetaminophen (TYLENOL) 500 MG tablet Take 500-1,000 mg by mouth every 6 hours as needed for mild pain     order for DME Equipment being ordered: CPAP  AIRSENSE 10  5-18 CM H20  # [id number]   DN# [id number]     order for DME Equipment being ordered: [id number]\" x2pair     \"20-30mmHg Farrow Hybrid Liners\" x 2 pair     EPINEPHrine (EPIPEN) 0.3 MG/0.3ML injection Inject 0.3 mLs (0.3 mg) into the muscle once as needed for anaphylaxis (Patient not taking: Reported on 3/24/2017)     order for DME Equipment being ordered: DEPENDS SIZE LARGE     order for DME Equipment being ordered:     \"[id number]\" x2pair    \"20-30mmHg Farrow Hybrid Liners\" x1pair     order for DME Equipment being ordered: INCONTINENCE PADS   QID     order for DME Equipment being ordered: CPAP supplies needed     ORDER FOR DME Equipment being ordered: CPAP     ASPIRIN NOT PRESCRIBED, INTENTIONAL, by Other route continuous prn Reported on 3/24/2017     No current facility-administered medications for this visit.        Therapist:  Kd Morris LP    PHQ-9:  PHQ-9 score:    PHQ-9 SCORE 3/29/2017   Total Score -   Total Score 9       CHIN-7:  CHIN-7 SCORE 12/29/2016 1/18/2017 3/24/2017   Total Score - - -   Total Score 9 10 6           Interim History:  Patient returns for follow up from appointment 1-. Lamotrigine (Lamictal) 200 mg daily, aripiprazole (Abilify) 7.5 mg daily, bupropion (Wellbutrin)  mg daily and trazodone (Desyrel) 150 mg at bedtime was continued. Patient was to follow up with PCP.     Today, patient reports she is here for refills of medications. Patient reports her mood has been stable and is feeling \"real " "good\".     Patient reports having a new roommate who is \"too lazy\" and \"don't like to pay rent\". Patient states \"He's gotta go\". Patient has been enjoying visits with her grandson. Patient had shoulder surgery which \"went good\".       Past Medical History:   Diagnosis Date     Anxiety state, unspecified      Bipolar I disorder, most recent episode (or current) unspecified      Depressive disorder, not elsewhere classified      DVT, lower extremity (H)      Polycythemia, secondary     [place] HGB        10 point ROS is negative except for those listed above.     Vital Signs:   /75 (BP Location: Left arm, Patient Position: Chair, Cuff Size: Adult Regular)  Pulse 76  Temp 96.4  F (35.8  C) (Tympanic)  Wt 227 lb (103 kg)  LMP 09/27/2009  BMI 45.85 kg/m2      Mental Status Assessment:  Appearance:  Well groomed, tobacco odor.  Behavior/relationship to examiner/demeanor:  Cooperative, engaged and pleasant  Motor activity:  Normal  Gait:  Normal   Speech:  Normal in volume, articulation, coherence   Mood (subjective report):  \"Real good\"  Affect (objective appearance):  Mood congruent  Thought Process (Associations):  Logical, linear and goal directed  Thought content:  No evidence of suicidal or homicidal ideation,          no overt psychosis and                    patient does not appear to be responding to internal stimuli  Oriented to person, place, date/time   Attention Span and concentration: Intact   Memory:  Short-term memory intact and Long-term memory; Intact  Language:  Fluent   Fund of Knowledge/Intelligence:  Average  Use of language: Intact   Abstraction:  Normal  Insight:  Adequate  Judgment:  Adequate for safety    DSM DIAGNOSIS:  296.80 Unspecified Bipolar and Related Disorder    305.00 Alcohol Use Disorder, in sustained remission    Assessment:  Patient's mood is stable and is doing well. Patient is ready to return to the care of her PCP.     Medication side effects and alternatives were " reviewed.     Treatment Plan:  Continue lamotrigine (Lamictal) 200 mg daily, aripiprazole (Abilify) 7.5 mg daily, bupropion (Wellbutrin)  mg daily and trazodone (Desyrel) 150 mg at bedtime.   Continue individual therapy.   Follow up with primary care provider in 1-2 months.     - Recommend patient discuss medications with their pharmacist.   - Safety plan was reviewed; to the ER as needed or call after hours crisis line; [phone]  - Education and counseling was done regarding use of medications, psychotherapy options  - Call [phone] for appointment or to speak to a nurse.    -Office hours: Monday through Thursday 8:00 am to 4:30 pm; Friday 8:00 am to Noon.   - Patient received a copy of this Treatment Plan today.    The patient is being returned to the referring provider for ongoing care and medication prescribing.  The patient can be referred back to this service for further consultation as needed.      Signed:  Lisa Bai, RN, MS, CNS-BC

## 2017-03-29 NOTE — TELEPHONE ENCOUNTER
Signed Prescriptions:                        Disp   Refills    warfarin (COUMADIN) 5 MG tablet            220 ta*0        Sig: as directed by Anticoagulation Clinic, current dose           15 mg MWF, 10 mg rest of week  Authorizing Provider: JUDE KEYS  Ordering User: FOX TORRES RN refilled medication per [facility] refill protocol.  Fox Torres RN

## 2017-03-30 ASSESSMENT — ANXIETY QUESTIONNAIRES: GAD7 TOTAL SCORE: 10

## 2017-03-30 ASSESSMENT — PATIENT HEALTH QUESTIONNAIRE - PHQ9: SUM OF ALL RESPONSES TO PHQ QUESTIONS 1-9: 9

## 2017-04-03 ENCOUNTER — OFFICE VISIT (OUTPATIENT)
Dept: PSYCHOLOGY | Facility: CLINIC | Age: 52
End: 2017-04-03
Payer: COMMERCIAL

## 2017-04-03 ENCOUNTER — ANTICOAGULATION THERAPY VISIT (OUTPATIENT)
Dept: ANTICOAGULATION | Facility: CLINIC | Age: 52
End: 2017-04-03
Payer: COMMERCIAL

## 2017-04-03 DIAGNOSIS — Z79.01 LONG TERM CURRENT USE OF ANTICOAGULANT THERAPY: ICD-10-CM

## 2017-04-03 DIAGNOSIS — I73.9 PVD (PERIPHERAL VASCULAR DISEASE) WITH CLAUDICATION (H): ICD-10-CM

## 2017-04-03 DIAGNOSIS — D75.1 POLYCYTHEMIA, SECONDARY: ICD-10-CM

## 2017-04-03 DIAGNOSIS — I73.9 PERIPHERAL VASCULAR DISEASE WITH CLAUDICATION (H): ICD-10-CM

## 2017-04-03 DIAGNOSIS — I82.409 DVT (DEEP VENOUS THROMBOSIS) (H): ICD-10-CM

## 2017-04-03 DIAGNOSIS — F31.62 MODERATE MIXED BIPOLAR I DISORDER (H): Primary | ICD-10-CM

## 2017-04-03 LAB
BASOPHILS # BLD AUTO: 0 10E9/L (ref 0–0.2)
BASOPHILS NFR BLD AUTO: 0.3 %
DIFFERENTIAL METHOD BLD: ABNORMAL
EOSINOPHIL # BLD AUTO: 0 10E9/L (ref 0–0.7)
EOSINOPHIL NFR BLD AUTO: 0.7 %
ERYTHROCYTE [DISTWIDTH] IN BLOOD BY AUTOMATED COUNT: 20.8 % (ref 10–15)
HCT VFR BLD AUTO: 60.7 % (ref 35–47)
HGB BLD-MCNC: 19.4 G/DL (ref 11.7–15.7)
IMM GRANULOCYTES # BLD: 0 10E9/L (ref 0–0.4)
IMM GRANULOCYTES NFR BLD: 0.3 %
INR PPP: 2.38 (ref 0.86–1.14)
LYMPHOCYTES # BLD AUTO: 1.4 10E9/L (ref 0.8–5.3)
LYMPHOCYTES NFR BLD AUTO: 24 %
MCH RBC QN AUTO: 28.2 PG (ref 26.5–33)
MCHC RBC AUTO-ENTMCNC: 32 G/DL (ref 31.5–36.5)
MCV RBC AUTO: 88 FL (ref 78–100)
MONOCYTES # BLD AUTO: 0.5 10E9/L (ref 0–1.3)
MONOCYTES NFR BLD AUTO: 8.7 %
NEUTROPHILS # BLD AUTO: 3.8 10E9/L (ref 1.6–8.3)
NEUTROPHILS NFR BLD AUTO: 66 %
PLATELET # BLD AUTO: 111 10E9/L (ref 150–450)
RBC # BLD AUTO: 6.88 10E12/L (ref 3.8–5.2)
WBC # BLD AUTO: 5.8 10E9/L (ref 4–11)

## 2017-04-03 PROCEDURE — 85610 PROTHROMBIN TIME: CPT | Performed by: FAMILY MEDICINE

## 2017-04-03 PROCEDURE — 85025 COMPLETE CBC W/AUTO DIFF WBC: CPT | Performed by: FAMILY MEDICINE

## 2017-04-03 PROCEDURE — 99207 ZZC NO CHARGE NURSE ONLY: CPT | Performed by: REGISTERED NURSE

## 2017-04-03 PROCEDURE — 90834 PSYTX W PT 45 MINUTES: CPT | Performed by: PSYCHOLOGIST

## 2017-04-03 PROCEDURE — 36415 COLL VENOUS BLD VENIPUNCTURE: CPT | Performed by: FAMILY MEDICINE

## 2017-04-03 NOTE — MR AVS SNAPSHOT
MRN:[id number]                      After Visit Summary   4/3/2017    Bia Bill    MRN: [id number]           Visit Information        Provider Department      4/3/2017 9:30 AM Kd Morris [facility] Generic      Your next 10 appointments already scheduled     Apr 07, 2017 12:30 PM CDT   Treatment with Orquidea Orellana PT   [facility] ([facility])    [address] [place] [address]   [phone]            Apr 18, 2017  3:00 PM CDT   Return Visit with Kd Morris   [facility] ([facility])    [address] [address]   [phone]            Apr 27, 2017  2:00 PM CDT   Return Sleep Patient with Patel Bazzi MD   [facility] ([facility])    [address] [address]   [phone]            May 01, 2017  1:30 PM CDT   Return Visit with Patel Rice MD   [facility] [place] ([facility])    [address]  [place] [address]   [phone]            May 03, 2017  8:30 AM CDT   Return Visit with Kd Morris   [facility] ([facility])    [address] [address]   [phone]            May 03, 2017 10:05 AM CDT   LAB with [facility] ([facility])    [address] [address]   [phone]           Patient must bring picture ID.  Patient should be prepared to give a urine specimen  Please do not eat 10-12 hours before your appointment if you are coming in fasting for labs on lipids, cholesterol, or glucose (sugar).  Pregnant women should follow their Care Team instructions. Water with medications is okay. Do not drink coffee or other fluids.   If you have concerns about taking  your medications, please ask at office or if  scheduling via [employer], send a message by clicking on Secure Messaging, Message Your Care Team.            Jun 07, 2017 10:00 AM CDT   LAB with [place] [facility]   [facility] ([facility])    [address] [address]   [phone]           Patient must bring picture ID.  Patient should be prepared to give a urine specimen  Please do not eat 10-12 hours before your appointment if you are coming in fasting for labs on lipids, cholesterol, or glucose (sugar).  Pregnant women should follow their Care Team instructions. Water with medications is okay. Do not drink coffee or other fluids.   If you have concerns about taking  your medications, please ask at office or if scheduling via [employer], send a message by clicking on Secure Messaging, Message Your Care Team.            Jul 05, 2017 10:00 AM CDT   LAB with [place] [facility]   [facility] ([facility])    [address] [address]   [phone]           Patient must bring picture ID.  Patient should be prepared to give a urine specimen  Please do not eat 10-12 hours before your appointment if you are coming in fasting for labs on lipids, cholesterol, or glucose (sugar).  Pregnant women should follow their Care Team instructions. Water with medications is okay. Do not drink coffee or other fluids.   If you have concerns about taking  your medications, please ask at office or if scheduling via [employer], send a message by clicking on Secure Messaging, Message Your Care Team.            Aug 02, 2017 10:00 AM CDT   LAB with [place] [facility]   [facility] ([facility])    [address] [address]   [phone]           Patient must bring picture ID.  Patient should be prepared to give a urine specimen  Please do not eat 10-12 hours before your appointment if you are coming in fasting for labs on lipids, cholesterol, or glucose (sugar).  Pregnant women should  "follow their Care Team instructions. Water with medications is okay. Do not drink coffee or other fluids.   If you have concerns about taking  your medications, please ask at office or if scheduling via [employer], send a message by clicking on Secure Messaging, Message Your Care Team.            Sep 06, 2017 10:00 AM CDT   LAB with [place] [facility]   [facility] ([facility])    [address]  [facility] [address]   [phone]           Patient must bring picture ID.  Patient should be prepared to give a urine specimen  Please do not eat 10-12 hours before your appointment if you are coming in fasting for labs on lipids, cholesterol, or glucose (sugar).  Pregnant women should follow their Care Team instructions. Water with medications is okay. Do not drink coffee or other fluids.   If you have concerns about taking  your medications, please ask at office or if scheduling via [employer], send a message by clicking on Secure Messaging, Message Your Care Team.              [employer] Information     [employer] lets you send messages to your doctor, view your test results, renew your prescriptions, schedule appointments and more. To sign up, go to [email or web address] . Click on \"Log in\" on the left side of the screen, which will take you to the Welcome page. Then click on \"Sign up Now\" on the right side of the page.     You will be asked to enter the access code listed below, as well as some personal information. Please follow the directions to create your username and password.     Your access code is: [email or web address]  Expires: 7/3/2017  3:48 PM     Your access code will  in 90 days. If you need help or a new code, please call your [facility] or [phone].        Care EveryWhere ID     This is your Care EveryWhere ID. This could be used by other organizations to access your [facility] medical records  [id number]        "

## 2017-04-03 NOTE — MR AVS SNAPSHOT
Bia Bill   4/3/2017   Anticoagulation Therapy Visit    Description:  51 year old female   Provider:  Brenna Rausch, RN   Department:  [facility]           INR as of 4/3/2017     Today's INR 2.38      Anticoagulation Summary as of 4/3/2017     INR goal 2.0-3.0   Today's INR 2.38   Full instructions 15 mg on Mon, Wed, Fri; 10 mg all other days   Next INR check 5/3/2017    Indications   Long term current use of anticoagulant therapy [Z79.01]  DVT (deep venous thrombosis) (H) [I82.409]  PVD (peripheral vascular disease) with claudication (H) [I73.9]         Description     No change, recheck INR 5/3/17.      April 2017 Details    Sun Mon Tue Wed Thu Fri Sat           1                 2               3      15 mg   See details      4      10 mg         5      15 mg         6      10 mg         7      15 mg         8      10 mg           9      10 mg         10      15 mg         11      10 mg         12      15 mg         13      10 mg         14      15 mg         15      10 mg           16      10 mg         17      15 mg         18      10 mg         19      15 mg         20      10 mg         21      15 mg         22      10 mg           23      10 mg         24      15 mg         25      10 mg         26      15 mg         27      10 mg         28      15 mg         29      10 mg           30      10 mg                Date Details   04/03 This INR check   Special tube used to correct for high hematocrit               How to take your warfarin dose     To take:  10 mg Take 2 of the 5 mg tablets.    To take:  15 mg Take 3 of the 5 mg tablets.           May 2017 Details    Sun Mon Tue Wed Thu Fri Sat      1      15 mg         2      10 mg         3            4               5               6                 7               8               9               10               11               12               13                 14               15               16               17               18                19               20                 21               22               23               24               25               26               27                 28               29               30               31                   Date Details   No additional details    Date of next INR:  5/3/2017         How to take your warfarin dose     To take:  10 mg Take 2 of the 5 mg tablets.    To take:  15 mg Take 3 of the 5 mg tablets.

## 2017-04-03 NOTE — PROGRESS NOTES
ANTICOAGULATION FOLLOW-UP CLINIC VISIT    Patient Name:  Bia Bill  Date:  4/3/2017  Contact Type:  Telephone/ discussed with pt    SUBJECTIVE:     Patient Findings     Positives No Problem Findings (none reported)    Comments RBC, Hgb & Hct all high today.  PLT = 111.           OBJECTIVE    INR   Date Value Ref Range Status   04/03/2017 2.38 (H) 0.86 - 1.14 Final     Comment:     Special tube used to correct for high hematocrit       ASSESSMENT / PLAN  INR assessment THER    Recheck INR In: 4 WEEKS 5/3/17   INR Location Clinic lab draw - Special tube used to correct for high hematocrit     Anticoagulation Summary as of 4/3/2017     INR goal 2.0-3.0   Today's INR 2.38   Maintenance plan 15 mg (5 mg x 3) on Mon, Wed, Fri; 10 mg (5 mg x 2) all other days   Full instructions 15 mg on Mon, Wed, Fri; 10 mg all other days   Weekly total 85 mg   Plan last modified Eula Batres RN (3/24/2017)   Next INR check 5/3/2017   Priority INR   Target end date Indefinite    Indications   Long term current use of anticoagulant therapy [Z79.01]  DVT (deep venous thrombosis) (H) [I82.409]  PVD (peripheral vascular disease) with claudication (H) [I73.9]         Anticoagulation Episode Summary     INR check location     Preferred lab     Send INR reminders to [place] PHONE [facility] POOL    Comments * lab draw due to elevated hematocrit (fingerstick meters don't work). LEFT LE. STENT x 3 LEFT UPPER LEG. Previous arterial clot. Bridging instructions for 2/7/17 surgery in [facility] note from 1/19/17.  2/27/17 CURRENT PHONE # [phone]      Anticoagulation Care Providers     Provider Role Specialty Phone number    Kd Leong MD [facility] [phone]            See the Encounter Report to view Anticoagulation Flowsheet and Dosing Calendar (Go to Encounters tab in chart review, and find the Anticoagulation Therapy Visit)    Brenna Rausch RN

## 2017-04-04 NOTE — PROGRESS NOTES
Progress Note  Disclaimer: This note consists of symbols derived from keyboarding, dictation and/or voice recognition software. As a result, there may be errors in the script that have gone undetected. Please consider this when interpreting information found in this chart.    Client Name: Bia Bill  Date: 3/15/2017         Service Type: Individual      Session Start Time: 9:30 AM  Session End Time: 10:15 AM      Session Length: 45     Session #: 10     Attendees: Client attended alone    Treatment Plan Last Reviewed: 3/1/2017  PHQ-9 / CHIN-7 : 12     DATA   Client reported A tough week. She returned home from a good weekend with her sister to find that her roommate, someone she had taken in, head shaved 2 of her dogs during a drug induced hallucination. She evicted the individual immediately. We spent some time talking about her need to help others and how it occasionally sets her up for this type of disappointment. Reports she is stable with her medications.     Progress Since Last Session (Related to Symptoms / Goals / Homework):   Symptoms: Improved    Homework: Achieved / completed to satisfaction      Episode of Care Goals: Satisfactory progress - ACTION (Actively working towards change); Intervened by reinforcing change plan / affirming steps taken     Current / Ongoing Stressors and Concerns:   Recurrent depression  family relational problems,pain from recent shoulder surgery   Treatment Objective(s) Addressed in This Session:   Increase interest, engagement, and pleasure in doing things  Decrease frequency and intensity of feeling down, depressed, hopeless       Intervention:   CBT: Behavioral activation Spent some time talking about The concept of codependency.        ASSESSMENT: Current Emotional / Mental Status (status of significant symptoms):   Risk status (Self / Other harm or suicidal ideation)   Client denies current fears or concerns for personal  safety.   Client denies current or recent suicidal ideation or behaviors.   Client denies current or recent homicidal ideation or behaviors.   Client denies current or recent self injurious behavior or ideation.   Client denies other safety concerns.   A safety and risk management plan has not been developed at this time, however client was given the after-hours number / 911 should there be a change in any of these risk factors.     Appearance:   Appropriate    Eye Contact:   Good    Psychomotor Behavior: Normal    Attitude:   Cooperative    Orientation:   All   Speech    Rate / Production: Normal     Volume:  Normal    Mood:    Normal   Affect:    Appropriate    Thought Content:  Clear    Thought Form:  Coherent  Logical    Insight:    Good      Medication Review:   No changes to current psychiatric medication(s)     Medication Compliance:   Yes     Changes in Health Issues:   None reported     Chemical Use Review:   Substance Use: Chemical use reviewed, no active concerns identified      Tobacco Use: No current tobacco use.       Collateral Reports Completed:   Not Applicable    PLAN: (Client Tasks / Therapist Tasks / Other)  Client to continue sticking to her boundaries with children and other family members. Spent some time taking care of herself instead of rushing to take care of others.    Kd Morris                                                         ________________________________________________________________________    Treatment Plan    Client's Name: Bia Bill  YOB: 1965    Date: 3/1/2017      DSM5 Diagnoses: (Sustained by DSM5 Criteria Listed Above)  Diagnoses: 296.40 Bipolar I Disorder Current or Most Recent Episode Manic, Unspecified  Psychosocial & Contextual Factors: financial difficulties, chronic pain, roommate issues  WHODAS 2.0 (12 item)               This questionnaire asks about difficulties due to health conditions. Health conditions             include  disease or illnesses, other health problems that may be short or long lasting,             injuries, mental health or emotional problems, and problems with alcohol or drugs.                         Think back over the past 30 days and answer these questions, thinking about how much             difficulty you had doing the following activities. For each question, please [place] only             one response.      S1  Standing for long periods such as 30 minutes?  Mild =           2    S2  Taking care of household responsibilities?  Moderate =   3    S3  Learning a new task, for example, learning how to get to a new place?  Mild =           2    S4  How much of a problem do you have joining community activities (for example, festivals, Taoist or other activities) in the same way as anyone else can?  Moderate =   3    S5  How much have you been emotionally affected by your health problems?  Mild =           2        In the past 30 days, how much difficulty did you have in:    S6  Concentrating on doing something for ten minutes?  Mild =           2    S7  Walking a long distance such as a kilometer (or equivalent)?  Severe =       4    S8  Washing your whole body?  None =         1    S9  Getting dressed?  None =         1    S10  Dealing with people you do not know?  Moderate =   3    S11  Maintaining a friendship?  Severe =       4    S12  Your day to day work?  Moderate =   3       H1  Overall, in the past 30 days, how many days were these difficulties present?  Record number of days seven    H2  In the past 30 days, for how many days were you totally unable to carry out your usual activities or work because of any health condition?  Record number of days  seven    H3  In the past 30 days, not counting the days that you were totally unable, for how many days did you cut back or reduce your usual activities or work because of any health condition?  Record number of days five                   Referral /  Collaboration:  Referral to another professional/service is not indicated at this time..    Anticipated number of session or this episode of care: 15    Goals  1. Education- the Biopsychosocial model of depression  a. Client will be able to describe how depression is effecting them physically, emotionally and socially  2. Behavioral Activation  a. Client will learn to assess their depression on a day to day basis  b. Client will Identify two forms of exercise/activity and engage in them 3 times per week  c. Client will Identify 3 things that make them laugh, and engage in these 5 times per week.  d. Client will Identify 1-2Creative activities or hobbies  and engage in them 2 times per week  e. Client will identify music, movies, books that make them feel good and use them 3-4 times per week  3. Self-care  a. Client will identify 5 things they can do just for themselves  b. Client will take time for quiet, reflection, meditation 5 times per week  c. Client will Learn to set boundaries when appropriate  d. Client will Identify 2 individuals they can call on for support, distraction  4. Assessment of progress  a. Client will engage in assessment of their progress on a regular basis    Bipolar Disorder  Treatment plan:  1. Education- the Biopsychosocial model of Bipolar Disorder    a. Client will be able to describe in general terms what Bipolar Disorder  is and is not  b. Client will be able to describe how Bipolar Disorder  has affected their life in at least two different areas, such as school or work and Home/relationships  c. Clients parents/guardians or significant others will be provided information on what Bipolar Disorder  is and the ways it can affect relationships and be encouraged to be a part of clients treatment team.  2. Medication  a. Client will participate in medication evaluation for Bipolar Disorder  symptoms and follow medication recommendations.   3. Identification and management of  triggers  a. Client and therapist will examine patient s history to determine if there are predictable triggers for manic or depressive episodes (e.g. boredom, anger, family stress)  b. Client and therapist will develop means of diffusing these triggers (e.g. relaxation strategies, boundary setting, anger management)  4. Comorbid conditions   a. Client and therapist will asses for comorbid conditions (e.g. anxiety, depression, substance use). and add additional items to treatment plan as needed  5. Self-care  a. Client will identify 3 things they can do just for themselves  b. Client will take time for quiet, reflection, meditation 3 times per week  c. Client will Learn to set boundaries when appropriate  d. Client will Identify 2 individuals they can call on for support, distraction  5. Behavioral Activation  a. Client will Identify two forms of exercise/activity and engage in them 3 times per week  b. Client will Identify 2 things that make them laugh, and engage in these 3 times per week.  c. Client will Identify 2 Creative activities or hobbies  and engage in them 2 times per week  d. Client will identify music, movies, books that make them feel good and use them 3 times per week  6. Assessment of progress  a. Client will engage in assessment of their progress on a regular basis    Client has reviewed and agreed to the above plan.      Kd Morris  3/1/2017

## 2017-04-07 ENCOUNTER — HOSPITAL ENCOUNTER (OUTPATIENT)
Dept: PHYSICAL THERAPY | Facility: CLINIC | Age: 52
Setting detail: THERAPIES SERIES
End: 2017-04-07
Attending: PHYSICIAN ASSISTANT
Payer: COMMERCIAL

## 2017-04-07 PROCEDURE — 40000718 ZZHC STATISTIC PT DEPARTMENT ORTHO VISIT: Performed by: PHYSICAL THERAPIST

## 2017-04-07 PROCEDURE — 97110 THERAPEUTIC EXERCISES: CPT | Mod: GP | Performed by: PHYSICAL THERAPIST

## 2017-04-14 ENCOUNTER — HOSPITAL ENCOUNTER (OUTPATIENT)
Dept: PHYSICAL THERAPY | Facility: CLINIC | Age: 52
Setting detail: THERAPIES SERIES
End: 2017-04-14
Attending: PHYSICIAN ASSISTANT
Payer: COMMERCIAL

## 2017-04-14 PROCEDURE — 97110 THERAPEUTIC EXERCISES: CPT | Mod: GP | Performed by: PHYSICAL THERAPIST

## 2017-04-14 PROCEDURE — 97140 MANUAL THERAPY 1/> REGIONS: CPT | Mod: GP | Performed by: PHYSICAL THERAPIST

## 2017-04-14 PROCEDURE — 40000718 ZZHC STATISTIC PT DEPARTMENT ORTHO VISIT: Performed by: PHYSICAL THERAPIST

## 2017-04-18 ENCOUNTER — OFFICE VISIT (OUTPATIENT)
Dept: PSYCHOLOGY | Facility: CLINIC | Age: 52
End: 2017-04-18
Payer: COMMERCIAL

## 2017-04-18 DIAGNOSIS — F31.62 MODERATE MIXED BIPOLAR I DISORDER (H): Primary | ICD-10-CM

## 2017-04-18 PROCEDURE — 90834 PSYTX W PT 45 MINUTES: CPT | Performed by: PSYCHOLOGIST

## 2017-04-18 NOTE — MR AVS SNAPSHOT
MRN:[id number]                      After Visit Summary   4/18/2017    Bia Bill    MRN: [id number]           Visit Information        Provider Department      4/18/2017 3:00 PM Kd Morris [facility]      Your next 10 appointments already scheduled     Apr 27, 2017  2:00 PM CDT   Return Sleep Patient with Patel Bazzi MD   [facility] ([facility])    [address]  [facility] [address]   [phone]            May 01, 2017  1:30 PM CDT   Return Visit with Patel Rice MD   [facility] [place] ([facility] [place])    [address]  [place] [address]   [phone]            May 03, 2017  8:30 AM CDT   Return Visit with Kd Morris   [facility] ([facility])    [address] [facility] [address]   [phone]            May 03, 2017 10:05 AM CDT   LAB with [place] [facility]   [facility] ([facility])    [address] [facility] [address]   [phone]           Patient must bring picture ID.  Patient should be prepared to give a urine specimen  Please do not eat 10-12 hours before your appointment if you are coming in fasting for labs on lipids, cholesterol, or glucose (sugar).  Pregnant women should follow their Care Team instructions. Water with medications is okay. Do not drink coffee or other fluids.   If you have concerns about taking  your medications, please ask at office or if scheduling via [employer], send a message by clicking on Secure Messaging, Message Your Care Team.            May 17, 2017  8:30 AM CDT   Return Visit with Kd Morris   [facility] ([facility])    [address] [facility] [address]   [phone]            Jun 07, 2017 10:00 AM CDT   LAB with [place] [facility]   [facility] ([facility]  [place])    [address] [facility] [address] [facility] [address]   [phone]           Patient must bring picture ID.  Patient should be prepared to give a urine specimen  Please do not eat 10-12 hours before your appointment if you are coming in fasting for labs on lipids, cholesterol, or glucose (sugar).  Pregnant women should follow their Care Team instructions. Water with medications is okay. Do not drink coffee or other fluids.   If you have concerns about taking  your medications, please ask at office or if scheduling via [employer], send a message by clicking on Secure Messaging, Message Your Care Team.            Jul 05, 2017 10:00 AM CDT   [place] with [place] [facility]   [facility] ([facility])    [address] [facility] [address]   [phone]           Patient must bring picture ID.  Patient should be prepared to give a urine specimen  Please do not eat 10-12 hours before your appointment if you are coming in fasting for labs on lipids, cholesterol, or glucose (sugar).  Pregnant women should follow their Care Team instructions. Water with medications is okay. Do not drink coffee or other fluids.   If you have concerns about taking  your medications, please ask at office or if scheduling via [employer], send a message by clicking on Secure Messaging, Message Your Care Team.            Aug 02, 2017 10:00 AM CDT   [place] with [place] [facility]   [facility] ([facility])    [address] [facility] [address] [address]   [phone]           Patient must bring picture ID.  Patient should be prepared to give a urine specimen  Please do not eat 10-12 hours before your appointment if you are coming in fasting for labs on lipids, cholesterol, or glucose (sugar).  Pregnant women should follow their Care Team instructions. Water with medications is okay. Do not drink coffee or other fluids.   If you have concerns about taking  your medications, please ask at office or if  "scheduling via [employer], send a message by clicking on Secure Messaging, Message Your Care Team.            Sep 06, 2017 10:00 AM CDT   LAB with [facility] ([facility])    [address] [place]  [facility] [address]   [phone]           Patient must bring picture ID.  Patient should be prepared to give a urine specimen  Please do not eat 10-12 hours before your appointment if you are coming in fasting for labs on lipids, cholesterol, or glucose (sugar).  Pregnant women should follow their Care Team instructions. Water with medications is okay. Do not drink coffee or other fluids.   If you have concerns about taking  your medications, please ask at office or if scheduling via [employer], send a message by clicking on Secure Messaging, Message Your Care Team.            Sep 22, 2017  1:00 PM CDT   Return Visit with Joshua Watts MD   [facility] ([facility])    [facility]  [address]  [facility] [address]   [phone]              [employer] Information     [employer] lets you send messages to your doctor, view your test results, renew your prescriptions, schedule appointments and more. To sign up, go to [email or web address] . Click on \"Log in\" on the left side of the screen, which will take you to the Welcome page. Then click on \"Sign up Now\" on the right side of the page.     You will be asked to enter the access code listed below, as well as some personal information. Please follow the directions to create your username and password.     Your access code is: [email or web address]  Expires: 7/3/2017  3:48 PM     Your access code will  in 90 days. If you need help or a new code, please call your [facility] or [phone].        Care EveryWhere ID     This is your Care EveryWhere ID. This could be used by other organizations to access your [place] medical records  [id number]        "

## 2017-04-21 NOTE — PROGRESS NOTES
Progress Note  Disclaimer: This note consists of symbols derived from keyboarding, dictation and/or voice recognition software. As a result, there may be errors in the script that have gone undetected. Please consider this when interpreting information found in this chart.    Client Name: Bia Bill  Date: 4/18/2017         Service Type: Individual      Session Start Time: 3:00 PM  Session End Time: 3:45 PM      Session Length: 45     Session #: 11     Attendees: Client attended alone    Treatment Plan Last Reviewed: 3/1/2017  PHQ-9 / CHIN-7 : 12     DATA   Client reported A very good week. She has been chatting with a gentleman on social media and visiting back and forth for some time and they have decided to formalize their relationship recently. She is extremely happy about this. He and his son were up for [employer] and things went extremely well.     Progress Since Last Session (Related to Symptoms / Goals / Homework):   Symptoms: Improved    Homework: Achieved / completed to satisfaction      Episode of Care Goals: Satisfactory progress - ACTION (Actively working towards change); Intervened by reinforcing change plan / affirming steps taken     Current / Ongoing Stressors and Concerns:   Recurrent depression  family relational problems,pain from recent shoulder surgery     Treatment Objective(s) Addressed in This Session:   Increase interest, engagement, and pleasure in doing things  Decrease frequency and intensity of feeling down, depressed, hopeless       Intervention:   CBT: Behavioral activation Celebrated gains made and spent some time talking about how her relationships have taught her lessons.      ASSESSMENT: Current Emotional / Mental Status (status of significant symptoms):   Risk status (Self / Other harm or suicidal ideation)   Client denies current fears or concerns for personal safety.   Client denies current or recent suicidal ideation or  behaviors.   Client denies current or recent homicidal ideation or behaviors.   Client denies current or recent self injurious behavior or ideation.   Client denies other safety concerns.   A safety and risk management plan has not been developed at this time, however client was given the after-hours number / 911 should there be a change in any of these risk factors.     Appearance:   Appropriate    Eye Contact:   Good    Psychomotor Behavior: Normal    Attitude:   Cooperative    Orientation:   All   Speech    Rate / Production: Normal     Volume:  Normal    Mood:    Normal   Affect:    Appropriate    Thought Content:  Clear    Thought Form:  Coherent  Logical    Insight:    Good      Medication Review:   No changes to current psychiatric medication(s)     Medication Compliance:   Yes     Changes in Health Issues:   None reported     Chemical Use Review:   Substance Use: Chemical use reviewed, no active concerns identified      Tobacco Use: No current tobacco use.       Collateral Reports Completed:   Not Applicable    PLAN: (Client Tasks / Therapist Tasks / Other)  Client to continue sticking to her boundaries with children and other family members. Spend some time taking care of herself instead of rushing to take care of others.    Kd Morris                                                         ________________________________________________________________________    Treatment Plan    Client's Name: Bia Bill  YOB: 1965    Date: 3/1/2017      DSM5 Diagnoses: (Sustained by DSM5 Criteria Listed Above)  Diagnoses: 296.40 Bipolar I Disorder Current or Most Recent Episode Manic, Unspecified  Psychosocial & Contextual Factors: financial difficulties, chronic pain, roommate issues  WHODAS 2.0 (12 item)               This questionnaire asks about difficulties due to health conditions. Health conditions             include disease or illnesses, other health problems that may be short or  long lasting,             injuries, mental health or emotional problems, and problems with alcohol or drugs.                         Think back over the past 30 days and answer these questions, thinking about how much             difficulty you had doing the following activities. For each question, please [place] only             one response.      S1  Standing for long periods such as 30 minutes?  Mild =           2    S2  Taking care of household responsibilities?  Moderate =   3    S3  Learning a new task, for example, learning how to get to a new place?  Mild =           2    S4  How much of a problem do you have joining community activities (for example, festivals, Faith or other activities) in the same way as anyone else can?  Moderate =   3    S5  How much have you been emotionally affected by your health problems?  Mild =           2        In the past 30 days, how much difficulty did you have in:    S6  Concentrating on doing something for ten minutes?  Mild =           2    S7  Walking a long distance such as a kilometer (or equivalent)?  Severe =       4    S8  Washing your whole body?  None =         1    S9  Getting dressed?  None =         1    S10  Dealing with people you do not know?  Moderate =   3    S11  Maintaining a friendship?  Severe =       4    S12  Your day to day work?  Moderate =   3       H1  Overall, in the past 30 days, how many days were these difficulties present?  Record number of days seven    H2  In the past 30 days, for how many days were you totally unable to carry out your usual activities or work because of any health condition?  Record number of days  seven    H3  In the past 30 days, not counting the days that you were totally unable, for how many days did you cut back or reduce your usual activities or work because of any health condition?  Record number of days five                   Referral / Collaboration:  Referral to another professional/service is not indicated at  this time..    Anticipated number of session or this episode of care: 15    Goals  1. Education- the Biopsychosocial model of depression  a. Client will be able to describe how depression is effecting them physically, emotionally and socially  2. Behavioral Activation  a. Client will learn to assess their depression on a day to day basis  b. Client will Identify two forms of exercise/activity and engage in them 3 times per week  c. Client will Identify 3 things that make them laugh, and engage in these 5 times per week.  d. Client will Identify 1-2Creative activities or hobbies  and engage in them 2 times per week  e. Client will identify music, movies, books that make them feel good and use them 3-4 times per week  3. Self-care  a. Client will identify 5 things they can do just for themselves  b. Client will take time for quiet, reflection, meditation 5 times per week  c. Client will Learn to set boundaries when appropriate  d. Client will Identify 2 individuals they can call on for support, distraction  4. Assessment of progress  a. Client will engage in assessment of their progress on a regular basis    Bipolar Disorder  Treatment plan:  1. Education- the Biopsychosocial model of Bipolar Disorder    a. Client will be able to describe in general terms what Bipolar Disorder  is and is not  b. Client will be able to describe how Bipolar Disorder  has affected their life in at least two different areas, such as school or work and Home/relationships  c. Clients parents/guardians or significant others will be provided information on what Bipolar Disorder  is and the ways it can affect relationships and be encouraged to be a part of clients treatment team.  2. Medication  a. Client will participate in medication evaluation for Bipolar Disorder  symptoms and follow medication recommendations.   3. Identification and management of triggers  a. Client and therapist will examine patient s history to determine if there are  predictable triggers for manic or depressive episodes (e.g. boredom, anger, family stress)  b. Client and therapist will develop means of diffusing these triggers (e.g. relaxation strategies, boundary setting, anger management)  4. Comorbid conditions   a. Client and therapist will asses for comorbid conditions (e.g. anxiety, depression, substance use). and add additional items to treatment plan as needed  5. Self-care  a. Client will identify 3 things they can do just for themselves  b. Client will take time for quiet, reflection, meditation 3 times per week  c. Client will Learn to set boundaries when appropriate  d. Client will Identify 2 individuals they can call on for support, distraction  5. Behavioral Activation  a. Client will Identify two forms of exercise/activity and engage in them 3 times per week  b. Client will Identify 2 things that make them laugh, and engage in these 3 times per week.  c. Client will Identify 2 Creative activities or hobbies  and engage in them 2 times per week  d. Client will identify music, movies, books that make them feel good and use them 3 times per week  6. Assessment of progress  a. Client will engage in assessment of their progress on a regular basis    Client has reviewed and agreed to the above plan.      Kd Morris  3/1/2017

## 2017-04-24 DIAGNOSIS — E78.00 HYPERCHOLESTEREMIA: ICD-10-CM

## 2017-04-24 NOTE — TELEPHONE ENCOUNTER
SIMVASTATIN 20 MG TAB ACCO       Last Written Prescription Date: 3/16/17  Last Fill Quantity: 30, # refills: 0  Last Office Visit with G, P or [facility] prescribing provider: 3/16/17  Next 5 appointments (look out 90 days)     May 01, 2017  1:30 PM CDT   Return Visit with Patel Rice MD   [facility] [place] ([facility] [place])    [address]  [place] [address]   [phone]            May 03, 2017  8:30 AM CDT   Return Visit with Kd CHAWLA [facility] ([facility])    [address] [address]   [phone]            May 17, 2017  8:30 AM CDT   Return Visit with Kd CAMMY [facility] ([facility])    [address] [address]   [phone]                   Lab Results   Component Value Date    CHOL 154 03/15/2017     Lab Results   Component Value Date    HDL 30 03/15/2017     Lab Results   Component Value Date    LDL 90 03/15/2017     Lab Results   Component Value Date    TRIG 172 03/15/2017     Lab Results   Component Value Date    CHOLHDLRATIO 5.0 09/10/2015

## 2017-04-25 ENCOUNTER — HOSPITAL ENCOUNTER (OUTPATIENT)
Dept: PHYSICAL THERAPY | Facility: CLINIC | Age: 52
Setting detail: THERAPIES SERIES
End: 2017-04-25
Attending: PHYSICIAN ASSISTANT
Payer: COMMERCIAL

## 2017-04-25 PROCEDURE — 97110 THERAPEUTIC EXERCISES: CPT | Mod: GP | Performed by: PHYSICAL THERAPIST

## 2017-04-25 PROCEDURE — 40000718 ZZHC STATISTIC PT DEPARTMENT ORTHO VISIT: Performed by: PHYSICAL THERAPIST

## 2017-04-25 RX ORDER — SIMVASTATIN 20 MG
TABLET ORAL
Qty: 90 TABLET | Refills: 3 | Status: SHIPPED | OUTPATIENT
Start: 2017-04-25 | End: 2018-04-05

## 2017-04-25 NOTE — PROGRESS NOTES
Outpatient Physical Therapy Progress Note     Patient: Bia Bill  : 1965    Beginning/End Dates of Reporting Period:  3/3/17 to 2017    Referring Provider: KAILEE Franklin    Therapy Diagnosis: s/p right rotator cuff debridement and biceps tenodesis performed 17.       Client Self Report: Shoulder is loosening up, fiance helps to stretch and massage it.   Still unable to reach overhead without help.      Objective Measurements:  Objective Measure: PROM  Details: Flexion=160 deg; ER=55 deg both pain free  Objective Measure: Seated AROM  Details: Vmpsrad=767 deg; ER=55 deg  Objective Measure: SPADI  Details: 73.31         Goals:  Goal Identifier     Goal Description Following PT interventions, patient will have >=170 deg right GH flexion AROM to allow patient to reach top cupboard.   Target Date 17   Date Met      Progress:     Goal Identifier     Goal Description Following PT interventions, patient will have >=4/5 right GH ER strength to reduce impingement with overhead ADL's.   Target Date 17   Date Met      Progress:     Goal Identifier     Goal Description Following PT interventions, patient will be able to put on and off a sweatshirt with <3/10 right shoulder pain.   Target Date 17   Date Met  17   Progress:       Progress Toward Goals:   Progress this reporting period: Radha has made good progress with pain free PROM and postural awareness.  She is progressing with AROM and rotator cuff strength at 1 lb.  She will benefit from continued skilled physical therapy to allow for return to normalized ADL's and IADl's.        Plan:  Continue therapy per current plan of care.    Discharge:  Meron Orellana, PT, DPT  Doctor of Physical Therapy #[id number]  [facility]  [phone]  [email or web address]

## 2017-04-27 ENCOUNTER — OFFICE VISIT (OUTPATIENT)
Dept: SLEEP MEDICINE | Facility: CLINIC | Age: 52
End: 2017-04-27
Payer: COMMERCIAL

## 2017-04-27 VITALS
OXYGEN SATURATION: 96 % | SYSTOLIC BLOOD PRESSURE: 125 MMHG | BODY MASS INDEX: 45.76 KG/M2 | HEART RATE: 80 BPM | HEIGHT: 59 IN | WEIGHT: 227 LBS | DIASTOLIC BLOOD PRESSURE: 68 MMHG

## 2017-04-27 DIAGNOSIS — G47.33 OSA (OBSTRUCTIVE SLEEP APNEA): Primary | ICD-10-CM

## 2017-04-27 PROCEDURE — 99214 OFFICE O/P EST MOD 30 MIN: CPT | Performed by: FAMILY MEDICINE

## 2017-04-27 NOTE — NURSING NOTE
"Chief Complaint   Patient presents with     Sleep Apnea     Follow up for c-pap compliance. She received a replacement machine in March       Initial /68  Pulse 80  Ht 1.499 m (4' 11\")  Wt 103 kg (227 lb)  LMP 09/27/2009  SpO2 96%  BMI 45.85 kg/m2 Estimated body mass index is 45.85 kg/(m^2) as calculated from the following:    Height as of this encounter: 1.499 m (4' 11\").    Weight as of this encounter: 103 kg (227 lb).  Medication Reconciliation: complete  "

## 2017-04-27 NOTE — MR AVS SNAPSHOT
After Visit Summary   4/27/2017    Bia Bill    MRN: [id number]           Patient Information     Date Of Birth          1965        Visit Information        Provider Department      4/27/2017 2:00 PM Patel Bazzi MD [facility]        Today's Diagnoses     GILES (obstructive sleep apnea)    -  1      Care Instructions      Your BMI is Body mass index is 45.85 kg/(m^2).  Weight management is a personal decision.  If you are interested in exploring weight loss strategies, the following discussion covers the approaches that may be successful. Body mass index (BMI) is one way to tell whether you are at a healthy weight, overweight, or obese. It measures your weight in relation to your height.  A BMI of 18.5 to 24.9 is in the healthy range. A person with a BMI of 25 to 29.9 is considered overweight, and someone with a BMI of 30 or greater is considered obese. More than two-thirds of American adults are considered overweight or obese.  Being overweight or obese increases the risk for further weight gain. Excess weight may lead to heart disease and diabetes.  Creating and following plans for healthy eating and physical activity may help you improve your health.  Weight control is part of healthy lifestyle and includes exercise, emotional health, and healthy eating habits. Careful eating habits lifelong are the mainstay of weight control. Though there are significant health benefits from weight loss, long-term weight loss with diet alone may be very difficult to achieve- studies show long-term success with dietary management in less than 10% of people. Attaining a healthy weight may be especially difficult to achieve in those with severe obesity. In some cases, medications, devices and surgical management might be considered.  What can you do?  If you are overweight or obese and are interested in methods for weight loss, you should discuss this with your provider.      Consider reducing daily calorie intake by 500 calories.     Keep a food journal.     Avoiding skipping meals, consider cutting portions instead.    Diet combined with exercise helps maintain muscle while optimizing fat loss. Strength training is particularly important for building and maintaining muscle mass. Exercise helps reduce stress, increase energy, and improves fitness. Increasing exercise without diet control, however, may not burn enough calories to loose weight.       Start walking three days a week 10-20 minutes at a time    Work towards walking thirty minutes five days a week     Eventually, increase the speed of your walking for 1-2 minutes at time    In addition, we recommend that you review healthy lifestyles and methods for weight loss available through the National Institutes of Health patient information sites:  http://win.niddk.nih.gov/publications/index.htm    And look into health and wellness programs that may be available through your health insurance provider, employer, local community center, or [employer] club.    Weight management plan: Patient was referred to their PCP to discuss a diet and exercise plan.          Follow-ups after your visit        Follow-up notes from your care team     Return in about 1 year (around 4/27/2018), or if symptoms worsen or fail to improve.      Your next 10 appointments already scheduled     May 01, 2017  1:30 PM CDT   Return Visit with Patel Rice MD   [facility] [place] ([facility])    [address] [address]  [place] [id number] [phone]            May 02, 2017  1:30 PM CDT   Treatment with Orquidea Orellana PT   [facility] ([facility])    [address] [place] [address]   [phone]            May 03, 2017  8:30 AM CDT   Return Visit with Kd Morris   [facility]    [address]  [facility] [address]   [phone]            May 03, 2017 10:05 AM CDT   LAB with [facility] ([facility]    [address] [facility] [address]   [phone]           Patient must bring picture ID.  Patient should be prepared to give a urine specimen  Please do not eat 10-12 hours before your appointment if you are coming in fasting for labs on lipids, cholesterol, or glucose (sugar).  Pregnant women should follow their Care Team instructions. Water with medications is okay. Do not drink coffee or other fluids.   If you have concerns about taking  your medications, please ask at office or if scheduling via [employer], send a message by clicking on Secure Messaging, Message Your Care Team.            May 09, 2017  1:30 PM CDT   Treatment with Orquidea Orellana PT   [facility] ([facility])    [address] [place] [address]   [phone]            May 17, 2017  8:30 AM CDT   Return Visit with Kd Morris   [facility] ([facility])    [address] [facility] [address]   [phone]            Jun 07, 2017 10:00 AM CDT   LAB with [facility] ([facility])    [address] [facility] [address]   [phone]           Patient must bring picture ID.  Patient should be prepared to give a urine specimen  Please do not eat 10-12 hours before your appointment if you are coming in fasting for labs on lipids, cholesterol, or glucose (sugar).  Pregnant women should follow their Care Team instructions. Water with medications is okay. Do not drink coffee or other fluids.   If you have concerns about taking  your medications, please ask at office or if scheduling via [employer], send a message by clicking on Secure Messaging, Message Your Care Team.            Jul 05, 2017 10:00 AM CDT   LAB with [facility]  ([facility])    [address] [facility] [address]   [phone]           Patient must bring picture ID.  Patient should be prepared to give a urine specimen  Please do not eat 10-12 hours before your appointment if you are coming in fasting for labs on lipids, cholesterol, or glucose (sugar).  Pregnant women should follow their Care Team instructions. Water with medications is okay. Do not drink coffee or other fluids.   If you have concerns about taking  your medications, please ask at office or if scheduling via [employer], send a message by clicking on Secure Messaging, Message Your Care Team.            Aug 02, 2017 10:00 AM CDT   LAB with [place] [facility]   [facility]    [address] [facility] [address]   [phone]           Patient must bring picture ID.  Patient should be prepared to give a urine specimen  Please do not eat 10-12 hours before your appointment if you are coming in fasting for labs on lipids, cholesterol, or glucose (sugar).  Pregnant women should follow their Care Team instructions. Water with medications is okay. Do not drink coffee or other fluids.   If you have concerns about taking  your medications, please ask at office or if scheduling via [employer], send a message by clicking on Secure Messaging, Message Your Care Team.            Sep 06, 2017 10:00 AM CDT   LAB with [place] [facility]   [facility]    [address] [facility] [address]   [phone]           Patient must bring picture ID.  Patient should be prepared to give a urine specimen  Please do not eat 10-12 hours before your appointment if you are coming in fasting for labs on lipids, cholesterol, or glucose (sugar).  Pregnant women should follow their Care Team instructions. Water with medications is okay. Do not drink coffee or other fluids.   If you have concerns about taking  your medications, please ask at  "office or if scheduling via [employer], send a message by clicking on Secure Messaging, Message Your Care Team.              Who to contact     If you have questions or need follow up information about today's clinic visit or your schedule please contact [facility] directly at [phone].  Normal or non-critical lab and imaging results will be communicated to you by MyChart, letter or phone within 4 business days after the clinic has received the results. If you do not hear from us within 7 days, please contact the clinic through [employer] or phone. If you have a critical or abnormal lab result, we will notify you by phone as soon as possible.  Submit refill requests through [employer] or call your pharmacy and they will forward the refill request to us. Please allow 3 business days for your refill to be completed.          Additional Information About Your Visit        [employer] Information     [employer] lets you send messages to your doctor, view your test results, renew your prescriptions, schedule appointments and more. To sign up, go to [email or web address] . Click on \"Log in\" on the left side of the screen, which will take you to the Welcome page. Then click on \"Sign up Now\" on the right side of the page.     You will be asked to enter the access code listed below, as well as some personal information. Please follow the directions to create your username and password.     Your access code is: [email or web address]  Expires: 7/3/2017  3:48 PM     Your access code will  in 90 days. If you need help or a new code, please call your [facility] or [phone].        Care EveryWhere ID     This is your Care EveryWhere ID. This could be used by other organizations to access your [place] medical records  [id number]        Your Vitals Were     Pulse Height Last Period Pulse Oximetry BMI (Body Mass Index)       80 1.499 m (4' 11\") 2009 96% 45.85 kg/m2        Blood Pressure from Last 3 Encounters: "   04/27/17 125/68   03/29/17 144/75   03/24/17 102/63    Weight from Last 3 Encounters:   04/27/17 103 kg (227 lb)   03/29/17 103 kg (227 lb)   03/24/17 106.3 kg (234 lb 4.8 oz)              Today, you had the following     No orders found for display       Primary Care Provider Office Phone # Fax #    Kd Leong [phone]       [facility] [address] [facility] [address]        Thank you!     Thank you for choosing [facility]  for your care. Our goal is always to provide you with excellent care. Hearing back from our patients is one way we can continue to improve our services. Please take a few minutes to complete the written survey that you may receive in the mail after your visit with us. Thank you!             Your Updated Medication List - Protect others around you: Learn how to safely use, store and throw away your medicines at www.disposemymeds.org.          This list is accurate as of: 4/27/17 11:59 PM.  Always use your most recent med list.                   Brand Name Dispense Instructions for use    acetaminophen 500 MG tablet    TYLENOL     Take 500-1,000 mg by mouth every 6 hours as needed for mild pain       * albuterol (2.5 MG/3ML) 0.083% neb solution     1 Box    Take 1 vial (2.5 mg) by nebulization every 6 hours as needed for shortness of breath / dyspnea or wheezing       * albuterol 108 (90 BASE) MCG/ACT Inhaler    PROAIR HFA/PROVENTIL HFA/VENTOLIN HFA    3 Inhaler    Inhale 2 puffs into the lungs every 6 hours as needed for shortness of breath / dyspnea or wheezing       allopurinol 100 MG tablet    ZYLOPRIM    90 tablet    Take 1 tablet (100 mg) by mouth daily       ARIPiprazole 5 MG tablet    ABILIFY    45 tablet    Take 1.5 tablets (7.5 mg) by mouth At Bedtime       * ASPIRIN NOT PRESCRIBED    INTENTIONAL     by Other route continuous prn Reported on 3/24/2017       buPROPion 150 MG 24 hr tablet    WELLBUTRIN XL    90 tablet    Take 1  "tablet (150 mg) by mouth every morning       cholecalciferol 1000 UNIT tablet    vitamin D     Take 1 tablet by mouth daily       EPINEPHrine 0.3 MG/0.3ML injection     0.6 mL    Inject 0.3 mLs (0.3 mg) into the muscle once as needed for anaphylaxis       furosemide 20 MG tablet    LASIX    180 tablet    Take 1 tablet (20 mg) by mouth 2 times daily       gabapentin 600 MG tablet    NEURONTIN    90 tablet    Take 1 tablet (600 mg) by mouth 3 times daily       ipratropium - albuterol 0.5 mg/2.5 mg/3 mL 0.5-2.5 (3) MG/3ML neb solution    DUONEB    30 vial    Take 1 vial (3 mLs) by nebulization every 6 hours as needed for shortness of breath / dyspnea or wheezing USE THIS INSTEAD OF ALBUTEROL INHALER AS NEEDED FOR WHEEZE COUGH OR SHORTNESS OF BREATH       ketorolac 10 MG tablet    TORADOL    20 tablet    Take 1 tablet (10 mg) by mouth every 6 hours as needed       lamoTRIgine 200 MG tablet    LaMICtal    60 tablet    Take 1 tablet (200 mg) by mouth daily       levothyroxine 150 MCG tablet    SYNTHROID/LEVOTHROID    90 tablet    Take 1 tablet (150 mcg) by mouth daily       methocarbamol 500 MG tablet    ROBAXIN     Take 500 mg by mouth       mometasone-formoterol 100-5 MCG/ACT oral inhaler    DULERA    13 g    Inhale 2 puffs into the lungs 2 times daily       omeprazole 20 MG CR capsule    priLOSEC    180 capsule    TAKE 1 CAPSULE (20 MG) BY MOUTH 2 TIMES DAILY       * order for DME      Equipment being ordered: CPAP AIRSENSE 10 5-18 CM H20 # [id number]   DN# [id number]       * order for DME     1 Units    Equipment being ordered: CPAP       * order for DME     3 Month    Equipment being ordered: CPAP supplies needed       * order for DME     1 Month    Equipment being ordered: INCONTINENCE PADS  QID       * order for DME     60 Month    Equipment being ordered: DEPENDS SIZE LARGE       * order for DME     2 Device    Equipment being ordered:   \"[id number]\" x2pair  \"20-30mmHg [place] Hybrid Liners\" x1pair       * order " "for DME     2 Units    Equipment being ordered: [id number]\" x2pair   \"20-30mmHg [facility] Hybrid Liners\" x 2 pair       simvastatin 20 MG tablet    ZOCOR    90 tablet    TAKE ONE TABLET BY MOUTH AT BEDTIME       traZODone 150 MG tablet    DESYREL    90 tablet    Take 1 tablet (150 mg) by mouth At Bedtime       warfarin 5 MG tablet    COUMADIN    220 tablet    as directed by Anticoagulation Clinic, current dose 15 mg MWF, 10 mg rest of week       * Notice:  This list has 10 medication(s) that are the same as other medications prescribed for you. Read the directions carefully, and ask your doctor or other care provider to review them with you.      "

## 2017-04-27 NOTE — PATIENT INSTRUCTIONS
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
## 2017-05-01 ENCOUNTER — OFFICE VISIT (OUTPATIENT)
Dept: ORTHOPEDICS | Facility: CLINIC | Age: 52
End: 2017-05-01
Payer: COMMERCIAL

## 2017-05-01 VITALS — BODY MASS INDEX: 46.67 KG/M2 | RESPIRATION RATE: 16 BRPM | WEIGHT: 231.5 LBS | HEIGHT: 59 IN

## 2017-05-01 DIAGNOSIS — M25.511 CHRONIC RIGHT SHOULDER PAIN: ICD-10-CM

## 2017-05-01 DIAGNOSIS — G89.29 CHRONIC RIGHT SHOULDER PAIN: ICD-10-CM

## 2017-05-01 DIAGNOSIS — Z98.890 S/P ARTHROSCOPY OF SHOULDER: Primary | ICD-10-CM

## 2017-05-01 PROCEDURE — 99024 POSTOP FOLLOW-UP VISIT: CPT | Performed by: ORTHOPAEDIC SURGERY

## 2017-05-01 ASSESSMENT — PAIN SCALES - GENERAL: PAINLEVEL: EXTREME PAIN (8)

## 2017-05-01 NOTE — MR AVS SNAPSHOT
After Visit Summary   5/1/2017    Bia Bill    MRN: [id number]           Patient Information     Date Of Birth          1965        Visit Information        Provider Department      5/1/2017 1:30 PM Patel Rice MD [facility] [place]         Follow-ups after your visit        Your next 10 appointments already scheduled     May 01, 2017  1:30 PM CDT   Return Visit with Patel Rice MD   [facility] [place] ([facility] [place])    [address]  [place] [address]   [phone]            May 02, 2017  1:30 PM CDT   Treatment with Orquidea Orellana PT   [facility] ([facility])    [address] [place] [address]   [phone]            May 03, 2017  8:30 AM CDT   Return Visit with Kd Morris   [facility] ([facility])    [address] [facility] [address]   [phone]            May 03, 2017 10:05 AM CDT   LAB with [facility] ([facility])    [address] [facility] [address]   [phone]           Patient must bring picture ID.  Patient should be prepared to give a urine specimen  Please do not eat 10-12 hours before your appointment if you are coming in fasting for labs on lipids, cholesterol, or glucose (sugar).  Pregnant women should follow their Care Team instructions. Water with medications is okay. Do not drink coffee or other fluids.   If you have concerns about taking  your medications, please ask at office or if scheduling via [employer], send a message by clicking on Secure Messaging, Message Your Care Team.            May 09, 2017  1:30 PM CDT   Treatment with Orquidea Orellana PT   [facility] ([facility])    [address] [place] [address]   [phone]            May 17, 2017  8:30 AM CDT    Return Visit with Kd Morris   [facility] ([facility])    [address]  [facility] [address]   [phone]            Jun 07, 2017 10:00 AM CDT   LAB with [place] [facility]   [facility] ([facility])    [address] [facility] [address]   [phone]           Patient must bring picture ID.  Patient should be prepared to give a urine specimen  Please do not eat 10-12 hours before your appointment if you are coming in fasting for labs on lipids, cholesterol, or glucose (sugar).  Pregnant women should follow their Care Team instructions. Water with medications is okay. Do not drink coffee or other fluids.   If you have concerns about taking  your medications, please ask at office or if scheduling via [employer] send a message by clicking on Secure Messaging, Message Your Care Team.            Jul 05, 2017 10:00 AM CDT   LAB with [place] [facility]   [facility] ([facility])    [address] [facility] [address]   [phone]           Patient must bring picture ID.  Patient should be prepared to give a urine specimen  Please do not eat 10-12 hours before your appointment if you are coming in fasting for labs on lipids, cholesterol, or glucose (sugar).  Pregnant women should follow their Care Team instructions. Water with medications is okay. Do not drink coffee or other fluids.   If you have concerns about taking  your medications, please ask at office or if scheduling via [employer] send a message by clicking on Secure Messaging, Message Your Care Team.            Aug 02, 2017 10:00 AM CDT   LAB with [place] [facility]   [facility] ([facility])    [address] [facility] [address]   [phone]           Patient must bring picture ID.  Patient should be prepared to give a urine specimen  Please do not eat 10-12 hours before your appointment if you are coming in fasting for labs on lipids,  cholesterol, or glucose (sugar).  Pregnant women should follow their Care Team instructions. Water with medications is okay. Do not drink coffee or other fluids.   If you have concerns about taking  your medications, please ask at office or if scheduling via [employer], send a message by clicking on Secure Messaging, Message Your Care Team.            Sep 06, 2017 10:00 AM CDT   LAB with [place] [facility]   [facility]    [address] [facility] [address]   [phone]           Patient must bring picture ID.  Patient should be prepared to give a urine specimen  Please do not eat 10-12 hours before your appointment if you are coming in fasting for labs on lipids, cholesterol, or glucose (sugar).  Pregnant women should follow their Care Team instructions. Water with medications is okay. Do not drink coffee or other fluids.   If you have concerns about taking  your medications, please ask at office or if scheduling via [employer], send a message by clicking on Secure Messaging, Message Your Care Team.              Who to contact     If you have questions or need follow up information about today's clinic visit or your schedule please contact [facility] directly at [phone].  Normal or non-critical lab and imaging results will be communicated to you by [employer], letter or phone within 4 business days after the clinic has received the results. If you do not hear from us within 7 days, please contact the clinic through [employer] or phone. If you have a critical or abnormal lab result, we will notify you by phone as soon as possible.  Submit refill requests through [employer] or call your pharmacy and they will forward the refill request to us. Please allow 3 business days for your refill to be completed.          Additional Information About Your Visit        [employer] Information     [employer] lets you send messages to your doctor, view your test results,  "renew your prescriptions, schedule appointments and more. To sign up, go to [email or web address] . Click on \"Log in\" on the left side of the screen, which will take you to the Welcome page. Then click on \"Sign up Now\" on the right side of the page.     You will be asked to enter the access code listed below, as well as some personal information. Please follow the directions to create your username and password.     Your access code is: [email or web address]  Expires: 7/3/2017  3:48 PM     Your access code will  in 90 days. If you need help or a new code, please call your [facility] clinic or [phone].        Care EveryWhere ID     This is your Care EveryWhere ID. This could be used by other organizations to access your [facility] medical records  [id number]        Your Vitals Were     Last Period                   2009            Blood Pressure from Last 3 Encounters:   17 125/68   17 144/75   17 102/63    Weight from Last 3 Encounters:   17 103 kg (227 lb)   17 103 kg (227 lb)   17 106.3 kg (234 lb 4.8 oz)              Today, you had the following     No orders found for display       Primary Care Provider Office Phone # Fax #    Kd Leong [phone]       [facility] [address] [address] [facility] [address]        Thank you!     Thank you for choosing [facility]  for your care. Our goal is always to provide you with excellent care. Hearing back from our patients is one way we can continue to improve our services. Please take a few minutes to complete the written survey that you may receive in the mail after your visit with us. Thank you!             Your Updated Medication List - Protect others around you: Learn how to safely use, store and throw away your medicines at www.disposemymeds.org.          This list is accurate as of: 17  1:24 PM.  Always use your most recent med list.                   Brand " Name Dispense Instructions for use    acetaminophen 500 MG tablet    TYLENOL     Take 500-1,000 mg by mouth every 6 hours as needed for mild pain       * albuterol (2.5 MG/3ML) 0.083% neb solution     1 Box    Take 1 vial (2.5 mg) by nebulization every 6 hours as needed for shortness of breath / dyspnea or wheezing       * albuterol 108 (90 BASE) MCG/ACT Inhaler    PROAIR HFA/PROVENTIL HFA/VENTOLIN HFA    3 Inhaler    Inhale 2 puffs into the lungs every 6 hours as needed for shortness of breath / dyspnea or wheezing       allopurinol 100 MG tablet    ZYLOPRIM    90 tablet    Take 1 tablet (100 mg) by mouth daily       ARIPiprazole 5 MG tablet    ABILIFY    45 tablet    Take 1.5 tablets (7.5 mg) by mouth At Bedtime       * ASPIRIN NOT PRESCRIBED    INTENTIONAL     by Other route continuous prn Reported on 3/24/2017       buPROPion 150 MG 24 hr tablet    WELLBUTRIN XL    90 tablet    Take 1 tablet (150 mg) by mouth every morning       cholecalciferol 1000 UNIT tablet    vitamin D     Take 1 tablet by mouth daily       EPINEPHrine 0.3 MG/0.3ML injection     0.6 mL    Inject 0.3 mLs (0.3 mg) into the muscle once as needed for anaphylaxis       furosemide 20 MG tablet    LASIX    180 tablet    Take 1 tablet (20 mg) by mouth 2 times daily       gabapentin 600 MG tablet    NEURONTIN    90 tablet    Take 1 tablet (600 mg) by mouth 3 times daily       ipratropium - albuterol 0.5 mg/2.5 mg/3 mL 0.5-2.5 (3) MG/3ML neb solution    DUONEB    30 vial    Take 1 vial (3 mLs) by nebulization every 6 hours as needed for shortness of breath / dyspnea or wheezing USE THIS INSTEAD OF ALBUTEROL INHALER AS NEEDED FOR WHEEZE COUGH OR SHORTNESS OF BREATH       ketorolac 10 MG tablet    TORADOL    20 tablet    Take 1 tablet (10 mg) by mouth every 6 hours as needed       lamoTRIgine 200 MG tablet    LaMICtal    60 tablet    Take 1 tablet (200 mg) by mouth daily       levothyroxine 150 MCG tablet    SYNTHROID/LEVOTHROID    90 tablet    Take 1  "tablet (150 mcg) by mouth daily       methocarbamol 500 MG tablet    ROBAXIN     Take 500 mg by mouth       mometasone-formoterol 100-5 MCG/ACT oral inhaler    DULERA    13 g    Inhale 2 puffs into the lungs 2 times daily       omeprazole 20 MG CR capsule    priLOSEC    180 capsule    TAKE 1 CAPSULE (20 MG) BY MOUTH 2 TIMES DAILY       * order for DME      Equipment being ordered: CPAP AIRSENSE 10 5-18 CM H20 SN# [id number]   DN# [id number]       * order for DME     1 Units    Equipment being ordered: CPAP       * order for DME     3 Month    Equipment being ordered: CPAP supplies needed       * order for DME     1 Month    Equipment being ordered: INCONTINENCE PADS  QID       * order for DME     60 Month    Equipment being ordered: DEPENDS SIZE LARGE       * order for DME     2 Device    Equipment being ordered:   \"[id number]\" x2pair  \"20-30mmHg Farrow Hybrid Liners\" x1pair       * order for DME     2 Units    Equipment being ordered: [id number]\" x2pair   \"20-30mmHg Farrow Hybrid Liners\" x 2 pair       simvastatin 20 MG tablet    ZOCOR    90 tablet    TAKE ONE TABLET BY MOUTH AT BEDTIME       traZODone 150 MG tablet    DESYREL    90 tablet    Take 1 tablet (150 mg) by mouth At Bedtime       warfarin 5 MG tablet    COUMADIN    220 tablet    as directed by Anticoagulation Clinic, current dose 15 mg MWF, 10 mg rest of week       * Notice:  This list has 10 medication(s) that are the same as other medications prescribed for you. Read the directions carefully, and ask your doctor or other care provider to review them with you.      "

## 2017-05-01 NOTE — PATIENT INSTRUCTIONS
Please remember to call and schedule a follow up appointment if one was recommended at your earliest convenience.  Orthopedics CLINIC HOURS TELEPHONE NUMBER   Dr. Dwayne Desai  Certified Medical Assistant   Monday & Wednesday   8am - 5pm  Thursday 1pm - 5pm  Friday 8am -11:30am Specialty schedulers:   [phone] to schedule your surgery.  Main Clinic:   [phone] to make an appointment with any provider.    Urgent Care locations:    [facility] Monday-Friday Closed  Saturday-Sunday 9am-5pm      Monday-Friday 12pm - 8pm  Saturday-Sunday 9am-5pm [phone]     If SURGERY has been recommended, please call our Specialty Schedulers at [phone] to schedule your procedure.    If you need a medication refill, please contact your pharmacy. Please allow 3 business days for your refill to be completed.    If an MRI or CT scan has been recommended, please call [facility] Schedulers at [phone] to schedule your appointment.  Use [employer] (secure e-mail communication and access to your chart) to send a message or to make an appointment. Please ask how you can sign up for [employer].  Your care team's suggested websites for health information:   [email or web address] : Up to date and easily searchable information on multiple topics.   [email or web address] : [place] dept of heat, public health issues in [place], N1N1

## 2017-05-01 NOTE — LETTER
"5/1/2017      RE: Bia Bill  [address]  [facility] [address]       chief complaint:   Chief Complaint   Patient presents with     Surgical Followup     Right shoulder cuff debridement, biceps tenodesis. DOS: 2/7/17. 12 weeks. Doing better, but it's still sore. Going to PT 1x week and doing HEP.        SURGERY: Right shoulder arthroscopy ( [facility] ).  1. Right shoulder arthroscopic proximal biceps tenodesis   2. Right shoulder arthroscopic distal clavical resection   3. Right shoulder arthroscopic labral debridement   4. Right shoulder arthroscopic partial thickness rotator cuff debridement  5. Right shoulder arthroscopic subacromial decompression with partial acromioplasty    DATE OF SURGERY: 2/7/2017.    HISTORY OF PRESENT ILLNESS:  Bia Bill is a 51 year old female seen for postoperative evaluation of a right shoulder arthroscopy with proximal biceps tenodesis, distal clavical resection, labral debridement, rotator cuff debridement, and subacromial decompression for chronic right shoulder impingement syndrome, acromialclavicular arthritis and a low-grade partial thickness tearing of the anterior supraspinatus with type 2 SLAP tear. Surgery occurred 12 weeks ago. She returns today stating her shoulder is doing better, still sore though. She still has some pain, however not \"dramatic\" pain. 8/10. She is going to physical therapy 1x weekly. She is also doing a home exercise program. With the weather, she reports her pain is escalated. Patient is going to [place] at the end of the year, reports recently engaged to be .    Recall she was 10/10 pain preop, and understands there may be more to her pain than findings within the shoulder and that surgery may not alleviate her pain.    OR Findings: Proximal biceps tendinosis with longitudinal striational tearing. Type 2 SLAP tear. Partial thickness tear at the deep fibers of the subscapularis without full-thickness tear. " Glenohumeral synovitis. Intact articular-sided rotator cuff. Low grade partial thickness bursal sided rotator cuff tear, less than 30% far anterolateral supraspinatus. Prominent subacromial and subclavicular bone spurs with a tight arthritic acromioclavicular joint. Thickened subacromial bursa.    Past medical history:   Past Medical History:   Diagnosis Date     Anxiety state, unspecified      Bipolar I disorder, most recent episode (or current) unspecified      Depressive disorder, not elsewhere classified      DVT, lower extremity (H)      Polycythemia, secondary     [place] HGB        Past surgical history:   Past Surgical History:   Procedure Laterality Date     BILIARY STENT SINGLE USE COV      3 stints in left leg     BONE MARROW BIOPSY, BONE SPECIMEN, NEEDLE/TROCAR N/A 11/17/2014    Procedure: BIOPSY BONE MARROW;  Surgeon: Hay Aaron MD;  Location: [facility]     D & C  10/26/09    with uterine ablation     ESOPHAGOSCOPY, GASTROSCOPY, DUODENOSCOPY (EGD), COMBINED  4/21/2014    Procedure: Gastroscopy;  Surgeon: Moris Thomas MD;  Location: [facility]     HYSTERECTOMY, PAP NO LONGER INDICATED  1-4-2010     LITHOTRIPSY  2004    Lithotrypsy     Medications:   Current Outpatient Prescriptions:      simvastatin (ZOCOR) 20 MG tablet, TAKE ONE TABLET BY MOUTH AT BEDTIME, Disp: 90 tablet, Rfl: 3     omeprazole (PRILOSEC) 20 MG CR capsule, TAKE 1 CAPSULE (20 MG) BY MOUTH 2 TIMES DAILY, Disp: 180 capsule, Rfl: 1     lamoTRIgine (LAMICTAL) 200 MG tablet, Take 1 tablet (200 mg) by mouth daily, Disp: 60 tablet, Rfl: 3     ARIPiprazole (ABILIFY) 5 MG tablet, Take 1.5 tablets (7.5 mg) by mouth At Bedtime, Disp: 45 tablet, Rfl: 3     warfarin (COUMADIN) 5 MG tablet, as directed by Anticoagulation Clinic, current dose 15 mg MWF, 10 mg rest of week, Disp: 220 tablet, Rfl: 0     gabapentin (NEURONTIN) 600 MG tablet, Take 1 tablet (600 mg) by mouth 3 times daily, Disp: 90 tablet, Rfl: 1     mometasone-formoterol  "(DULERA) 100-5 MCG/ACT oral inhaler, Inhale 2 puffs into the lungs 2 times daily, Disp: 13 g, Rfl: 1     levothyroxine (SYNTHROID/LEVOTHROID) 150 MCG tablet, Take 1 tablet (150 mcg) by mouth daily, Disp: 90 tablet, Rfl: 1     methocarbamol (ROBAXIN) 500 MG tablet, Take 500 mg by mouth, Disp: , Rfl:      ketorolac (TORADOL) 10 MG tablet, Take 1 tablet (10 mg) by mouth every 6 hours as needed, Disp: 20 tablet, Rfl: 0     order for DME, Equipment being ordered: CPAP AIRSENSE 10 5-18 CM H20 # [id number]   DN# [id number], Disp: , Rfl:      traZODone (DESYREL) 150 MG tablet, Take 1 tablet (150 mg) by mouth At Bedtime, Disp: 90 tablet, Rfl: 1     allopurinol (ZYLOPRIM) 100 MG tablet, Take 1 tablet (100 mg) by mouth daily, Disp: 90 tablet, Rfl: 2     VITAMIN D3 1000 UNITS tablet, Take 1 tablet by mouth daily, Disp: , Rfl:      furosemide (LASIX) 20 MG tablet, Take 1 tablet (20 mg) by mouth 2 times daily, Disp: 180 tablet, Rfl: 2     buPROPion (WELLBUTRIN XL) 150 MG 24 hr tablet, Take 1 tablet (150 mg) by mouth every morning, Disp: 90 tablet, Rfl: 3     order for DME, Equipment being ordered: [id number]\" x2pair   \"20-30mmHg [facility] Hybrid Liners\" x 2 pair, Disp: 2 Units, Rfl: 11     EPINEPHrine (EPIPEN) 0.3 MG/0.3ML injection, Inject 0.3 mLs (0.3 mg) into the muscle once as needed for anaphylaxis, Disp: 0.6 mL, Rfl: 0     ipratropium - albuterol 0.5 mg/2.5 mg/3 mL (DUONEB) 0.5-2.5 (3) MG/3ML nebulization, Take 1 vial (3 mLs) by nebulization every 6 hours as needed for shortness of breath / dyspnea or wheezing USE THIS INSTEAD OF ALBUTEROL INHALER AS NEEDED FOR WHEEZE COUGH OR SHORTNESS OF BREATH, Disp: 30 vial, Rfl: 1     albuterol (PROAIR HFA, PROVENTIL HFA, VENTOLIN HFA) 108 (90 BASE) MCG/ACT inhaler, Inhale 2 puffs into the lungs every 6 hours as needed for shortness of breath / dyspnea or wheezing, Disp: 3 Inhaler, Rfl: 1     order for DME, Equipment being ordered: DEPENDS SIZE LARGE, Disp: 60 Month, Rfl: 5     order for " "DME, Equipment being ordered:   \"[id number]\" x2pair  \"20-30mmHg [employer]arrow Hybrid Liners\" x1pair, Disp: 2 Device, Rfl: 11     order for DME, Equipment being ordered: INCONTINENCE PADS  QID, Disp: 1 Month, Rfl: 11     order for DME, Equipment being ordered: CPAP supplies needed, Disp: 3 Month, Rfl: 3     albuterol (2.5 MG/3ML) 0.083% nebulizer solution, Take 1 vial (2.5 mg) by nebulization every 6 hours as needed for shortness of breath / dyspnea or wheezing, Disp: 1 Box, Rfl: 0     acetaminophen (TYLENOL) 500 MG tablet, Take 500-1,000 mg by mouth every 6 hours as needed for mild pain, Disp: , Rfl:      ORDER FOR DME, Equipment being ordered: CPAP, Disp: 1 Units, Rfl: 11     ASPIRIN NOT PRESCRIBED, INTENTIONAL,, by Other route continuous prn Reported on 3/24/2017, Disp: , Rfl: 0    Allergies:   Allergies   Allergen Reactions     Darvocet [Propoxyphene N-Apap] Anaphylaxis     Darvocet,Percocet      Percocet [Codeine] Anaphylaxis     Percocet [Oxycodone-Acetaminophen] Swelling, Anaphylaxis and Hives     Asa [Aspirin] Hives     Aspirin causes seizures and hives, throat swelling       Aspirin Hives     Bee      Lyrica [Pregabalin] Swelling and Unknown     Swelling.  dizziness     Iodine Rash     Reaction to topical betadine     Povidone Iodine Rash     Reaction to topical betadine     Tape [Adhesive Tape] Rash       This document serves as a record of the services and decisions personally performed and made by Patel Rice MD. It was created on his behalf by Licha Nicole, a trained medical scribe. The creation of this document is based the provider's statements to the medical scribe.    Scribe Licha Nicole 1:43 PM 5/1/2017     REVIEW OF SYSTEMS:   CONSTITUTIONAL:NEGATIVE for fever, chills, night sweats  INTEGUMENTARY/SKIN: NEGATIVE for worrisome wound problems or redness.  MUSCULOSKELETAL:See HPI above  NEURO: NEGATIVE for weakness, dizziness or paresthesias    PHYSICAL EXAM:  Resp 16  Ht 1.499 m (4' 11\")  Wt 105 kg (231 " "lb 8 oz)  LMP 09/27/2009  BMI 46.76 kg/m2   GENERAL APPEARANCE: healthy, alert, no distress  SKIN: diffuse right upper extremity scab lesions, notably about the shoulder area  NEURO: Normal strength and tone, mentation intact and speech normal  PSYCH:  mentation appears normal and affect normal, not anxious  RESPIRATORY: No increased work of breathing.    right UPPER EXTREMITY:  Sensation intact to light touch in median, radial, ulnar and axillary nerve distributions  Palpable 2+ radial pulse, brisk capillary refill to all fingers, wwp  Intact epl fpl fdp edc wrist flexion/extension biceps triceps deltoid    right SHOULDER:  Shoulder Inspection: no swelling, no ecchymosis, no erythema, no maceration, no deformity; diffuse \"scab\" like lesions not related to surgical sites.  Tender: diffuse tenderness in arm and shoulder  Range of Motion: forward flexion: 120 degrees active / 150 passive   External rotation: 40 active, 60 degrees passive  Strength: grossly intact, weak, limited by discomfort    X-RAY: none reviewed.      Impression: Bia Bill is a 51 year old female 12 weeks status post right shoulder arthroscopy and proximal biceps tenodesis, distal clavical resection, labral debridement, rotator cuff debridement, and subacromial decompression.     Plan: routine postoperative shoulder arthroscopy protocol  * WB status: light weight bearing per comfort.  * Activity: avoid aggravating activities.  * Rest.  * Ice twice daily to three times daily.  * continue Physical Therapy and home exercise program.  * Tylenol as needed for pain  * return to clinic in 2 months, or sooner as needed.    The information in this document, created by a scribe for me, accurately reflects the services I personally performed and the decisions made by me. I have reviewed and approved this document for accuracy.      Patel Rice M.D., M.S.  Dept. of Orthopaedic Surgery  [facility]            Patel Rice MD    "

## 2017-05-01 NOTE — NURSING NOTE
"Chief Complaint   Patient presents with     Surgical Followup     Right shoulder cuff debridement, biceps tenodesis. DOS: 2/7/17. 12 weeks. Doing better, but it's still sore. Going to PT 1x week and doing HEP.        Initial Resp 16  Ht 1.499 m (4' 11\")  Wt 105 kg (231 lb 8 oz)  LMP 09/27/2009  BMI 46.76 kg/m2 Estimated body mass index is 46.76 kg/(m^2) as calculated from the following:    Height as of this encounter: 1.499 m (4' 11\").    Weight as of this encounter: 105 kg (231 lb 8 oz).  Medication Reconciliation: complete   Duran Kumar PA-C, CAQ (Ortho)  Supervising Physician: Patel Rice M.D., M.S.  Dept. of Orthopaedic Surgery  [facility]      "

## 2017-05-01 NOTE — Clinical Note
"  5/1/2017       RE: Bia Bill  [address]  [facility] [address]           Dear Colleague,    Thank you for referring your patient, Bia Bill, to the [facility]. Please see a copy of my visit note below.    chief complaint:   Chief Complaint   Patient presents with     Surgical Followup     Right shoulder cuff debridement, biceps tenodesis. DOS: 2/7/17. 12 weeks. Doing better, but it's still sore. Going to PT 1x week and doing HEP.        SURGERY: Right shoulder arthroscopy ( [facility] ).  1. Right shoulder arthroscopic proximal biceps tenodesis   2. Right shoulder arthroscopic distal clavical resection   3. Right shoulder arthroscopic labral debridement   4. Right shoulder arthroscopic partial thickness rotator cuff debridement  5. Right shoulder arthroscopic subacromial decompression with partial acromioplasty    DATE OF SURGERY: 2/7/2017.    HISTORY OF PRESENT ILLNESS:  Bia Bill is a 51 year old female seen for postoperative evaluation of a right shoulder arthroscopy with proximal biceps tenodesis, distal clavical resection, labral debridement, rotator cuff debridement, and subacromial decompression for chronic right shoulder impingement syndrome, acromialclavicular arthritis and a low-grade partial thickness tearing of the anterior supraspinatus with type 2 SLAP tear. Surgery occurred 12 weeks ago. She returns today stating her shoulder is doing better, still sore though. She still has some pain, however not \"dramatic\" pain. 8/10. She is going to physical therapy 1x weekly. She is also doing a home exercise program. With the weather, she reports her pain is escalated. Patient is going to [place] at the end of the year.    Recall she was 10/10 pain preop, and understands there may be more to her pain than findings within the shoulder and that surgery may not alleviate her pain.    OR Findings: Proximal biceps tendinosis with " longitudinal striational tearing. Type 2 SLAP tear. Partial thickness tear at the deep fibers of the subscapularis without full-thickness tear. Glenohumeral synovitis. Intact articular-sided rotator cuff. Low grade partial thickness bursal sided rotator cuff tear, less than 30% far anterolateral supraspinatus. Prominent subacromial and subclavicular bone spurs with a tight arthritic acromioclavicular joint. Thickened subacromial bursa.    Past medical history:   Past Medical History:   Diagnosis Date     Anxiety state, unspecified      Bipolar I disorder, most recent episode (or current) unspecified      Depressive disorder, not elsewhere classified      DVT, lower extremity (H)      Polycythemia, secondary     [place] HGB        Past surgical history:   Past Surgical History:   Procedure Laterality Date     BILIARY STENT SINGLE USE COV      3 stints in left leg     BONE MARROW BIOPSY, BONE SPECIMEN, NEEDLE/TROCAR N/A 11/17/2014    Procedure: BIOPSY BONE MARROW;  Surgeon: Hay Aaron MD;  Location: [facility]     D & C  10/26/09    with uterine ablation     ESOPHAGOSCOPY, GASTROSCOPY, DUODENOSCOPY (EGD), COMBINED  4/21/2014    Procedure: Gastroscopy;  Surgeon: Moris Thomas MD;  Location: [facility]     HYSTERECTOMY, PAP NO LONGER INDICATED  1-4-2010     LITHOTRIPSY  2004    Lithotrypsy     Medications:   Current Outpatient Prescriptions:      simvastatin (ZOCOR) 20 MG tablet, TAKE ONE TABLET BY MOUTH AT BEDTIME, Disp: 90 tablet, Rfl: 3     omeprazole (PRILOSEC) 20 MG CR capsule, TAKE 1 CAPSULE (20 MG) BY MOUTH 2 TIMES DAILY, Disp: 180 capsule, Rfl: 1     lamoTRIgine (LAMICTAL) 200 MG tablet, Take 1 tablet (200 mg) by mouth daily, Disp: 60 tablet, Rfl: 3     ARIPiprazole (ABILIFY) 5 MG tablet, Take 1.5 tablets (7.5 mg) by mouth At Bedtime, Disp: 45 tablet, Rfl: 3     warfarin (COUMADIN) 5 MG tablet, as directed by Anticoagulation Clinic, current dose 15 mg MWF, 10 mg rest of week, Disp: 220 tablet,  "Rfl: 0     gabapentin (NEURONTIN) 600 MG tablet, Take 1 tablet (600 mg) by mouth 3 times daily, Disp: 90 tablet, Rfl: 1     mometasone-formoterol (DULERA) 100-5 MCG/ACT oral inhaler, Inhale 2 puffs into the lungs 2 times daily, Disp: 13 g, Rfl: 1     levothyroxine (SYNTHROID/LEVOTHROID) 150 MCG tablet, Take 1 tablet (150 mcg) by mouth daily, Disp: 90 tablet, Rfl: 1     methocarbamol (ROBAXIN) 500 MG tablet, Take 500 mg by mouth, Disp: , Rfl:      ketorolac (TORADOL) 10 MG tablet, Take 1 tablet (10 mg) by mouth every 6 hours as needed, Disp: 20 tablet, Rfl: 0     order for DME, Equipment being ordered: CPAP AIRSENSE 10 5-18 CM H20 # [id number]   DN# [id number], Disp: , Rfl:      traZODone (DESYREL) 150 MG tablet, Take 1 tablet (150 mg) by mouth At Bedtime, Disp: 90 tablet, Rfl: 1     allopurinol (ZYLOPRIM) 100 MG tablet, Take 1 tablet (100 mg) by mouth daily, Disp: 90 tablet, Rfl: 2     VITAMIN D3 1000 UNITS tablet, Take 1 tablet by mouth daily, Disp: , Rfl:      furosemide (LASIX) 20 MG tablet, Take 1 tablet (20 mg) by mouth 2 times daily, Disp: 180 tablet, Rfl: 2     buPROPion (WELLBUTRIN XL) 150 MG 24 hr tablet, Take 1 tablet (150 mg) by mouth every morning, Disp: 90 tablet, Rfl: 3     order for DME, Equipment being ordered: [id number]\" x2pair   \"20-30mmHg [facility] Hybrid Liners\" x 2 pair, Disp: 2 Units, Rfl: 11     EPINEPHrine (EPIPEN) 0.3 MG/0.3ML injection, Inject 0.3 mLs (0.3 mg) into the muscle once as needed for anaphylaxis, Disp: 0.6 mL, Rfl: 0     ipratropium - albuterol 0.5 mg/2.5 mg/3 mL (DUONEB) 0.5-2.5 (3) MG/3ML nebulization, Take 1 vial (3 mLs) by nebulization every 6 hours as needed for shortness of breath / dyspnea or wheezing USE THIS INSTEAD OF ALBUTEROL INHALER AS NEEDED FOR WHEEZE COUGH OR SHORTNESS OF BREATH, Disp: 30 vial, Rfl: 1     albuterol (PROAIR HFA, PROVENTIL HFA, VENTOLIN HFA) 108 (90 BASE) MCG/ACT inhaler, Inhale 2 puffs into the lungs every 6 hours as needed for shortness of breath / " "dyspnea or wheezing, Disp: 3 Inhaler, Rfl: 1     order for DME, Equipment being ordered: DEPENDS SIZE LARGE, Disp: 60 Month, Rfl: 5     order for DME, Equipment being ordered:   \"[id number]\" x2pair  \"20-30mmHg [employer] Hybrid Liners\" x1pair, Disp: 2 Device, Rfl: 11     order for DME, Equipment being ordered: INCONTINENCE PADS  QID, Disp: 1 Month, Rfl: 11     order for DME, Equipment being ordered: CPAP supplies needed, Disp: 3 Month, Rfl: 3     albuterol (2.5 MG/3ML) 0.083% nebulizer solution, Take 1 vial (2.5 mg) by nebulization every 6 hours as needed for shortness of breath / dyspnea or wheezing, Disp: 1 Box, Rfl: 0     acetaminophen (TYLENOL) 500 MG tablet, Take 500-1,000 mg by mouth every 6 hours as needed for mild pain, Disp: , Rfl:      ORDER FOR DME, Equipment being ordered: CPAP, Disp: 1 Units, Rfl: 11     ASPIRIN NOT PRESCRIBED, INTENTIONAL,, by Other route continuous prn Reported on 3/24/2017, Disp: , Rfl: 0    Allergies:   Allergies   Allergen Reactions     Darvocet [Propoxyphene N-Apap] Anaphylaxis     Darvocet,Percocet      Percocet [Codeine] Anaphylaxis     Percocet [Oxycodone-Acetaminophen] Swelling, Anaphylaxis and Hives     Asa [Aspirin] Hives     Aspirin causes seizures and hives, throat swelling       Aspirin Hives     Bee      Lyrica [Pregabalin] Swelling and Unknown     Swelling.  dizziness     Iodine Rash     Reaction to topical betadine     Povidone Iodine Rash     Reaction to topical betadine     Tape [Adhesive Tape] Rash       This document serves as a record of the services and decisions personally performed and made by Patel Rice MD. It was created on his behalf by Licha Nicole, a trained medical scribe. The creation of this document is based the provider's statements to the medical scribe.    Scribemre Nicole 1:43 PM 5/1/2017     REVIEW OF SYSTEMS:   CONSTITUTIONAL:NEGATIVE for fever, chills, night sweats  INTEGUMENTARY/SKIN: NEGATIVE for worrisome wound problems or " "redness.  MUSCULOSKELETAL:See HPI above  NEURO: NEGATIVE for weakness, dizziness or paresthesias    PHYSICAL EXAM:  Resp 16  Ht 1.499 m (4' 11\")  Wt 105 kg (231 lb 8 oz)  LMP 09/27/2009  BMI 46.76 kg/m2   GENERAL APPEARANCE: healthy, alert, no distress  SKIN: no suspicious lesions or rashes  NEURO: Normal strength and tone, mentation intact and speech normal  PSYCH:  mentation appears normal and affect normal, not anxious  RESPIRATORY: No increased work of breathing.    right UPPER EXTREMITY:  Sensation intact to light touch in median, radial, ulnar and axillary nerve distributions  Palpable 2+ radial pulse, brisk capillary refill to all fingers, wwp  Intact epl fpl fdp edc wrist flexion/extension biceps triceps deltoid    right SHOULDER:  Shoulder Inspection: no swelling, no ecchymosis, no erythema, no maceration, no deformity  Tender: diffuse tenderness in arm and shoulder  Range of Motion: limited by discomfort.  Strength: grossly intact, weak, limited by discomfort    X-RAY: none reviewed.      Impression: Bia Bill is a 51 year old female 12 weeks status post right shoulder arthroscopy and proximal biceps tenodesis, distal clavical resection, labral debridement, rotator cuff debridement, and subacromial decompression.     Plan: routine postoperative shoulder arthroscopy protocol  * WB status: light weight bearing per comfort.  * discontinue sling.  * Activity: avoid aggravating activities.  * Rest.  * Ice twice daily to three times daily.  * PT reordered for strengthening, stretching and range of motion exercises. Continue with at home exercises.  * Tylenol as needed for pain  * return to clinic in 2 months, or sooner as needed.    The information in this document, created by a scribe for me, accurately reflects the services I personally performed and the decisions made by me. I have reviewed and approved this document for accuracy.      Patel Rice M.D., M.S.  Dept. of Orthopaedic " Surgery  [facility]      Again, thank you for allowing me to participate in the care of your patient.        Sincerely,              Patel Rice MD

## 2017-05-01 NOTE — PROGRESS NOTES
"chief complaint:   Chief Complaint   Patient presents with     Surgical Followup     Right shoulder cuff debridement, biceps tenodesis. DOS: 2/7/17. 12 weeks. Doing better, but it's still sore. Going to PT 1x week and doing HEP.        SURGERY: Right shoulder arthroscopy ( [facility] ).  1. Right shoulder arthroscopic proximal biceps tenodesis   2. Right shoulder arthroscopic distal clavical resection   3. Right shoulder arthroscopic labral debridement   4. Right shoulder arthroscopic partial thickness rotator cuff debridement  5. Right shoulder arthroscopic subacromial decompression with partial acromioplasty    DATE OF SURGERY: 2/7/2017.    HISTORY OF PRESENT ILLNESS:  Bia Bill is a 51 year old female seen for postoperative evaluation of a right shoulder arthroscopy with proximal biceps tenodesis, distal clavical resection, labral debridement, rotator cuff debridement, and subacromial decompression for chronic right shoulder impingement syndrome, acromialclavicular arthritis and a low-grade partial thickness tearing of the anterior supraspinatus with type 2 SLAP tear. Surgery occurred 12 weeks ago. She returns today stating her shoulder is doing better, still sore though. She still has some pain, however not \"dramatic\" pain. 8/10. She is going to physical therapy 1x weekly. She is also doing a home exercise program. With the weather, she reports her pain is escalated. Patient is going to [place] at the end of the year, reports recently engaged to be .    Recall she was 10/10 pain preop, and understands there may be more to her pain than findings within the shoulder and that surgery may not alleviate her pain.    OR Findings: Proximal biceps tendinosis with longitudinal striational tearing. Type 2 SLAP tear. Partial thickness tear at the deep fibers of the subscapularis without full-thickness tear. Glenohumeral synovitis. Intact articular-sided rotator cuff. Low grade partial thickness " bursal sided rotator cuff tear, less than 30% far anterolateral supraspinatus. Prominent subacromial and subclavicular bone spurs with a tight arthritic acromioclavicular joint. Thickened subacromial bursa.    Past medical history:   Past Medical History:   Diagnosis Date     Anxiety state, unspecified      Bipolar I disorder, most recent episode (or current) unspecified      Depressive disorder, not elsewhere classified      DVT, lower extremity (H)      Polycythemia, secondary     [place] HGB        Past surgical history:   Past Surgical History:   Procedure Laterality Date     BILIARY STENT SINGLE USE COV      3 stints in left leg     BONE MARROW BIOPSY, BONE SPECIMEN, NEEDLE/TROCAR N/A 11/17/2014    Procedure: BIOPSY BONE MARROW;  Surgeon: Hay Aaron MD;  Location: [facility]     D & C  10/26/09    with uterine ablation     ESOPHAGOSCOPY, GASTROSCOPY, DUODENOSCOPY (EGD), COMBINED  4/21/2014    Procedure: Gastroscopy;  Surgeon: Moris Thomas MD;  Location: [facility]     HYSTERECTOMY, PAP NO LONGER INDICATED  1-4-2010     LITHOTRIPSY  2004    Lithotrypsy     Medications:   Current Outpatient Prescriptions:      simvastatin (ZOCOR) 20 MG tablet, TAKE ONE TABLET BY MOUTH AT BEDTIME, Disp: 90 tablet, Rfl: 3     omeprazole (PRILOSEC) 20 MG CR capsule, TAKE 1 CAPSULE (20 MG) BY MOUTH 2 TIMES DAILY, Disp: 180 capsule, Rfl: 1     lamoTRIgine (LAMICTAL) 200 MG tablet, Take 1 tablet (200 mg) by mouth daily, Disp: 60 tablet, Rfl: 3     ARIPiprazole (ABILIFY) 5 MG tablet, Take 1.5 tablets (7.5 mg) by mouth At Bedtime, Disp: 45 tablet, Rfl: 3     warfarin (COUMADIN) 5 MG tablet, as directed by Anticoagulation Clinic, current dose 15 mg MWF, 10 mg rest of week, Disp: 220 tablet, Rfl: 0     gabapentin (NEURONTIN) 600 MG tablet, Take 1 tablet (600 mg) by mouth 3 times daily, Disp: 90 tablet, Rfl: 1     mometasone-formoterol (DULERA) 100-5 MCG/ACT oral inhaler, Inhale 2 puffs into the lungs 2 times daily, Disp: 13  "g, Rfl: 1     levothyroxine (SYNTHROID/LEVOTHROID) 150 MCG tablet, Take 1 tablet (150 mcg) by mouth daily, Disp: 90 tablet, Rfl: 1     methocarbamol (ROBAXIN) 500 MG tablet, Take 500 mg by mouth, Disp: , Rfl:      ketorolac (TORADOL) 10 MG tablet, Take 1 tablet (10 mg) by mouth every 6 hours as needed, Disp: 20 tablet, Rfl: 0     order for DME, Equipment being ordered: CPAP AIRSENSE 10 5-18 CM H20 # [id number]   DN# [id number], Disp: , Rfl:      traZODone (DESYREL) 150 MG tablet, Take 1 tablet (150 mg) by mouth At Bedtime, Disp: 90 tablet, Rfl: 1     allopurinol (ZYLOPRIM) 100 MG tablet, Take 1 tablet (100 mg) by mouth daily, Disp: 90 tablet, Rfl: 2     VITAMIN D3 1000 UNITS tablet, Take 1 tablet by mouth daily, Disp: , Rfl:      furosemide (LASIX) 20 MG tablet, Take 1 tablet (20 mg) by mouth 2 times daily, Disp: 180 tablet, Rfl: 2     buPROPion (WELLBUTRIN XL) 150 MG 24 hr tablet, Take 1 tablet (150 mg) by mouth every morning, Disp: 90 tablet, Rfl: 3     order for DME, Equipment being ordered: [id number]\" x2pair   \"20-30mmHg [facility] Hybrid Liners\" x 2 pair, Disp: 2 Units, Rfl: 11     EPINEPHrine (EPIPEN) 0.3 MG/0.3ML injection, Inject 0.3 mLs (0.3 mg) into the muscle once as needed for anaphylaxis, Disp: 0.6 mL, Rfl: 0     ipratropium - albuterol 0.5 mg/2.5 mg/3 mL (DUONEB) 0.5-2.5 (3) MG/3ML nebulization, Take 1 vial (3 mLs) by nebulization every 6 hours as needed for shortness of breath / dyspnea or wheezing USE THIS INSTEAD OF ALBUTEROL INHALER AS NEEDED FOR WHEEZE COUGH OR SHORTNESS OF BREATH, Disp: 30 vial, Rfl: 1     albuterol (PROAIR HFA, PROVENTIL HFA, VENTOLIN HFA) 108 (90 BASE) MCG/ACT inhaler, Inhale 2 puffs into the lungs every 6 hours as needed for shortness of breath / dyspnea or wheezing, Disp: 3 Inhaler, Rfl: 1     order for DME, Equipment being ordered: DEPENDS SIZE LARGE, Disp: 60 Month, Rfl: 5     order for DME, Equipment being ordered:   \"[id number]\" x2pair  \"20-30mmHg [employer] Hybrid Liners\" " "x1pair, Disp: 2 Device, Rfl: 11     order for DME, Equipment being ordered: INCONTINENCE PADS  QID, Disp: 1 Month, Rfl: 11     order for DME, Equipment being ordered: CPAP supplies needed, Disp: 3 Month, Rfl: 3     albuterol (2.5 MG/3ML) 0.083% nebulizer solution, Take 1 vial (2.5 mg) by nebulization every 6 hours as needed for shortness of breath / dyspnea or wheezing, Disp: 1 Box, Rfl: 0     acetaminophen (TYLENOL) 500 MG tablet, Take 500-1,000 mg by mouth every 6 hours as needed for mild pain, Disp: , Rfl:      ORDER FOR DME, Equipment being ordered: CPAP, Disp: 1 Units, Rfl: 11     ASPIRIN NOT PRESCRIBED, INTENTIONAL,, by Other route continuous prn Reported on 3/24/2017, Disp: , Rfl: 0    Allergies:   Allergies   Allergen Reactions     Darvocet [Propoxyphene N-Apap] Anaphylaxis     Darvocet,Percocet      Percocet [Codeine] Anaphylaxis     Percocet [Oxycodone-Acetaminophen] Swelling, Anaphylaxis and Hives     Asa [Aspirin] Hives     Aspirin causes seizures and hives, throat swelling       Aspirin Hives     Bee      Lyrica [Pregabalin] Swelling and Unknown     Swelling.  dizziness     Iodine Rash     Reaction to topical betadine     Povidone Iodine Rash     Reaction to topical betadine     Tape [Adhesive Tape] Rash       This document serves as a record of the services and decisions personally performed and made by Patel Rice MD. It was created on his behalf by Licha Nicole, a trained medical scribe. The creation of this document is based the provider's statements to the medical scribe.    Scribe Licha Nicole 1:43 PM 5/1/2017     REVIEW OF SYSTEMS:   CONSTITUTIONAL:NEGATIVE for fever, chills, night sweats  INTEGUMENTARY/SKIN: NEGATIVE for worrisome wound problems or redness.  MUSCULOSKELETAL:See HPI above  NEURO: NEGATIVE for weakness, dizziness or paresthesias    PHYSICAL EXAM:  Resp 16  Ht 1.499 m (4' 11\")  Wt 105 kg (231 lb 8 oz)  LMP 09/27/2009  BMI 46.76 kg/m2   GENERAL APPEARANCE: healthy, alert, no " "distress  SKIN: diffuse right upper extremity scab lesions, notably about the shoulder area  NEURO: Normal strength and tone, mentation intact and speech normal  PSYCH:  mentation appears normal and affect normal, not anxious  RESPIRATORY: No increased work of breathing.    right UPPER EXTREMITY:  Sensation intact to light touch in median, radial, ulnar and axillary nerve distributions  Palpable 2+ radial pulse, brisk capillary refill to all fingers, wwp  Intact epl fpl fdp edc wrist flexion/extension biceps triceps deltoid    right SHOULDER:  Shoulder Inspection: no swelling, no ecchymosis, no erythema, no maceration, no deformity; diffuse \"scab\" like lesions not related to surgical sites.  Tender: diffuse tenderness in arm and shoulder  Range of Motion: forward flexion: 120 degrees active / 150 passive   External rotation: 40 active, 60 degrees passive  Strength: grossly intact, weak, limited by discomfort    X-RAY: none reviewed.      Impression: Bia Bill is a 51 year old female 12 weeks status post right shoulder arthroscopy and proximal biceps tenodesis, distal clavical resection, labral debridement, rotator cuff debridement, and subacromial decompression.     Plan: routine postoperative shoulder arthroscopy protocol  * WB status: light weight bearing per comfort.  * Activity: avoid aggravating activities.  * Rest.  * Ice twice daily to three times daily.  * continue Physical Therapy and home exercise program.  * Tylenol as needed for pain  * return to clinic in 2 months, or sooner as needed.    The information in this document, created by a scribe for me, accurately reflects the services I personally performed and the decisions made by me. I have reviewed and approved this document for accuracy.      Patel Rice M.D., M.S.  Dept. of Orthopaedic Surgery  [facility]    "

## 2017-05-02 ENCOUNTER — HOSPITAL ENCOUNTER (OUTPATIENT)
Dept: PHYSICAL THERAPY | Facility: CLINIC | Age: 52
Setting detail: THERAPIES SERIES
End: 2017-05-02
Attending: PHYSICIAN ASSISTANT
Payer: COMMERCIAL

## 2017-05-02 PROCEDURE — 40000718 ZZHC STATISTIC PT DEPARTMENT ORTHO VISIT: Performed by: PHYSICAL THERAPIST

## 2017-05-02 PROCEDURE — 97110 THERAPEUTIC EXERCISES: CPT | Mod: GP | Performed by: PHYSICAL THERAPIST

## 2017-05-03 ENCOUNTER — OFFICE VISIT (OUTPATIENT)
Dept: PSYCHOLOGY | Facility: CLINIC | Age: 52
End: 2017-05-03
Payer: COMMERCIAL

## 2017-05-03 ENCOUNTER — ANTICOAGULATION THERAPY VISIT (OUTPATIENT)
Dept: ANTICOAGULATION | Facility: CLINIC | Age: 52
End: 2017-05-03
Payer: COMMERCIAL

## 2017-05-03 DIAGNOSIS — I82.409 DVT (DEEP VENOUS THROMBOSIS) (H): ICD-10-CM

## 2017-05-03 DIAGNOSIS — I73.9 PERIPHERAL VASCULAR DISEASE WITH CLAUDICATION (H): ICD-10-CM

## 2017-05-03 DIAGNOSIS — Z79.01 LONG TERM CURRENT USE OF ANTICOAGULANT THERAPY: ICD-10-CM

## 2017-05-03 DIAGNOSIS — F31.62 MODERATE MIXED BIPOLAR I DISORDER (H): Primary | ICD-10-CM

## 2017-05-03 DIAGNOSIS — D75.1 POLYCYTHEMIA, SECONDARY: ICD-10-CM

## 2017-05-03 DIAGNOSIS — I73.9 PVD (PERIPHERAL VASCULAR DISEASE) WITH CLAUDICATION (H): ICD-10-CM

## 2017-05-03 LAB
BASOPHILS # BLD AUTO: 0 10E9/L (ref 0–0.2)
BASOPHILS NFR BLD AUTO: 0.4 %
DIFFERENTIAL METHOD BLD: ABNORMAL
EOSINOPHIL # BLD AUTO: 0.1 10E9/L (ref 0–0.7)
EOSINOPHIL NFR BLD AUTO: 1 %
ERYTHROCYTE [DISTWIDTH] IN BLOOD BY AUTOMATED COUNT: 21 % (ref 10–15)
HCT VFR BLD AUTO: 59.6 % (ref 35–47)
HGB BLD-MCNC: 18.8 G/DL (ref 11.7–15.7)
IMM GRANULOCYTES # BLD: 0 10E9/L (ref 0–0.4)
IMM GRANULOCYTES NFR BLD: 0.2 %
INR PPP: 1.95 (ref 0.86–1.14)
LYMPHOCYTES # BLD AUTO: 1.4 10E9/L (ref 0.8–5.3)
LYMPHOCYTES NFR BLD AUTO: 27.4 %
MCH RBC QN AUTO: 27.6 PG (ref 26.5–33)
MCHC RBC AUTO-ENTMCNC: 31.5 G/DL (ref 31.5–36.5)
MCV RBC AUTO: 88 FL (ref 78–100)
MONOCYTES # BLD AUTO: 0.3 10E9/L (ref 0–1.3)
MONOCYTES NFR BLD AUTO: 6.8 %
NEUTROPHILS # BLD AUTO: 3.2 10E9/L (ref 1.6–8.3)
NEUTROPHILS NFR BLD AUTO: 64.2 %
PLATELET # BLD AUTO: 91 10E9/L (ref 150–450)
RBC # BLD AUTO: 6.81 10E12/L (ref 3.8–5.2)
WBC # BLD AUTO: 5 10E9/L (ref 4–11)

## 2017-05-03 PROCEDURE — 99207 ZZC NO CHARGE NURSE ONLY: CPT

## 2017-05-03 PROCEDURE — 90834 PSYTX W PT 45 MINUTES: CPT | Performed by: PSYCHOLOGIST

## 2017-05-03 PROCEDURE — 36415 COLL VENOUS BLD VENIPUNCTURE: CPT | Performed by: FAMILY MEDICINE

## 2017-05-03 PROCEDURE — 85610 PROTHROMBIN TIME: CPT | Performed by: FAMILY MEDICINE

## 2017-05-03 PROCEDURE — 85025 COMPLETE CBC W/AUTO DIFF WBC: CPT | Performed by: FAMILY MEDICINE

## 2017-05-03 RX ORDER — WARFARIN SODIUM 5 MG/1
TABLET ORAL
Qty: 220 TABLET | Refills: 0 | COMMUNITY
Start: 2017-05-03 | End: 2017-07-08

## 2017-05-03 NOTE — MR AVS SNAPSHOT
MRN:[id number]                      After Visit Summary   5/3/2017    Bia Bill    MRN: [id number]           Visit Information        Provider Department      5/3/2017 8:30 AM Kd Morris [facility]      Your next 10 appointments already scheduled     May 09, 2017  1:30 PM CDT   Treatment with Orquidea Orellana PT   [facility] ([facility])    [address] [place] [address]   [phone]            May 17, 2017  8:30 AM CDT   Return Visit with Kd Morris   [facility] ([facility])    [address] [address]   [phone]            Jun 07, 2017  8:30 AM CDT   Return Visit with Kd Morris   [facility] ([facility])    [address] [address]   [phone]            Jun 07, 2017 10:00 AM CDT   LAB with [facility] ([facility])    [address] [address]   [phone]           Patient must bring picture ID.  Patient should be prepared to give a urine specimen  Please do not eat 10-12 hours before your appointment if you are coming in fasting for labs on lipids, cholesterol, or glucose (sugar).  Pregnant women should follow their Care Team instructions. Water with medications is okay. Do not drink coffee or other fluids.   If you have concerns about taking  your medications, please ask at office or if scheduling via [employer], send a message by clicking on Secure Messaging, Message Your Care Team.            Jul 05, 2017 10:00 AM CDT   LAB with [place] [facility]   [facility] ([facility])    [address] [address]   [phone]           Patient must bring picture ID.  Patient should be prepared to give a urine specimen  Please do not eat 10-12 hours before your appointment if you are coming in  fasting for labs on lipids, cholesterol, or glucose (sugar).  Pregnant women should follow their Care Team instructions. Water with medications is okay. Do not drink coffee or other fluids.   If you have concerns about taking  your medications, please ask at office or if scheduling via [employer], send a message by clicking on Secure Messaging, Message Your Care Team.            Jul 10, 2017  1:15 PM CDT   Return Visit with Patel Rice MD   [facility] [place] ([facility] [place])    [address]  [place] [address]   [phone]            Aug 02, 2017 10:00 AM CDT   LAB with [facility] ([facility])    [address] [address]   [phone]           Patient must bring picture ID.  Patient should be prepared to give a urine specimen  Please do not eat 10-12 hours before your appointment if you are coming in fasting for labs on lipids, cholesterol, or glucose (sugar).  Pregnant women should follow their Care Team instructions. Water with medications is okay. Do not drink coffee or other fluids.   If you have concerns about taking  your medications, please ask at office or if scheduling via [employer], send a message by clicking on Secure Messaging, Message Your Care Team.            Sep 06, 2017 10:00 AM CDT   LAB with [facility] [facility]   [facility] ([facility])    [address] [address]   [phone]           Patient must bring picture ID.  Patient should be prepared to give a urine specimen  Please do not eat 10-12 hours before your appointment if you are coming in fasting for labs on lipids, cholesterol, or glucose (sugar).  Pregnant women should follow their Care Team instructions. Water with medications is okay. Do not drink coffee or other fluids.   If you have concerns about taking  your medications, please ask at office or if scheduling via [employer],  "send a message by clicking on Secure Messaging, Message Your Care Team.            Sep 22, 2017  1:00 PM CDT   Return Visit with Joshua Watts MD   [facility] ([facility])    [facility]  [address]  [place] [place] [address]   [phone]              MyChart Information     MyChart lets you send messages to your doctor, view your test results, renew your prescriptions, schedule appointments and more. To sign up, go to [email or web address] . Click on \"Log in\" on the left side of the screen, which will take you to the Welcome page. Then click on \"Sign up Now\" on the right side of the page.     You will be asked to enter the access code listed below, as well as some personal information. Please follow the directions to create your username and password.     Your access code is: [email or web address]  Expires: 7/3/2017  3:48 PM     Your access code will  in 90 days. If you need help or a new code, please call your [place] clinic or [phone].        Care EveryWhere ID     This is your Care EveryWhere ID. This could be used by other organizations to access your [place] medical records  [id number]        "

## 2017-05-03 NOTE — PROGRESS NOTES
ANTICOAGULATION FOLLOW-UP CLINIC VISIT    Patient Name:  Bia Bill  Date:  5/3/2017  Contact Type:  Telephone/ Norah Bill    SUBJECTIVE:     Patient Findings     Positives No Problem Findings    Comments No changes since last INR. Patient was therapeutic after last dosing increase only 1 time. She is now slightly sub therapeutic again. Will increase dosing another 5.9% and recheck with next set of labs in 1 month.           OBJECTIVE    INR   Date Value Ref Range Status   05/03/2017 1.95 (H) 0.86 - 1.14 Final     Comment:     Special tube used to correct for high hematocrit       ASSESSMENT / PLAN  INR assessment THER    Recheck INR In: 4 WEEKS    INR Location Clinic lab     Anticoagulation Summary as of 5/3/2017     INR goal 2.0-3.0   Today's INR 1.95!   Maintenance plan 10 mg (5 mg x 2) on Mon, Wed, Fri; 15 mg (5 mg x 3) all other days   Full instructions 5/3: 15 mg; Otherwise 10 mg on Mon, Wed, Fri; 15 mg all other days   Weekly total 90 mg   Plan last modified Mireille Torres RN (5/3/2017)   Next INR check 6/7/2017   Priority INR   Target end date Indefinite    Indications   Long term current use of anticoagulant therapy [Z79.01]  DVT (deep venous thrombosis) (H) [I82.409]  PVD (peripheral vascular disease) with claudication (H) [I73.9]         Anticoagulation Episode Summary     INR check location     Preferred lab     Send INR reminders to [place] PHONE ANTICOAG POOL    Comments * lab draw due to elevated hematocrit (fingerstick meters don't work). LEFT LE. STENT x 3 LEFT UPPER LEG. Previous arterial clot. 2/27/17 CURRENT PHONE # [phone]      Anticoagulation Care Providers     Provider Role Specialty Phone number    Kd Leong MD [facility] [phone]            See the Encounter Report to view Anticoagulation Flowsheet and Dosing Calendar (Go to Encounters tab in chart review, and find the Anticoagulation Therapy Visit)        Mireille Torres RN

## 2017-05-03 NOTE — MR AVS SNAPSHOT
Bia BRITTNEY Bill   5/3/2017   Anticoagulation Therapy Visit    Description:  51 year old female   Provider:  Mireille Torres, RN   [facility]           INR as of 5/3/2017     Today's INR 1.95!      Anticoagulation Summary as of 5/3/2017     INR goal 2.0-3.0   Today's INR 1.95!   Full instructions 5/3: 15 mg; Otherwise 10 mg on Mon, Wed, Fri; 15 mg all other days   Next INR check 6/7/2017    Indications   Long term current use of anticoagulant therapy [Z79.01]  DVT (deep venous thrombosis) (H) [I82.409]  PVD (peripheral vascular disease) with claudication (H) [I73.9]         May 2017 Details    Sun Mon Tue Wed Thu Fri Sat      1               2               3      15 mg   See details      4      15 mg         5      10 mg         6      15 mg           7      15 mg         8      10 mg         9      15 mg         10      10 mg         11      15 mg         12      10 mg         13      15 mg           14      15 mg         15      10 mg         16      15 mg         17      10 mg         18      15 mg         19      10 mg         20      15 mg           21      15 mg         22      10 mg         23      15 mg         24      10 mg         25      15 mg         26      10 mg         27      15 mg           28      15 mg         29      10 mg         30      15 mg         31      10 mg             Date Details   05/03 This INR check               How to take your warfarin dose     To take:  10 mg Take 2 of the 5 mg tablets.    To take:  15 mg Take 3 of the 5 mg tablets.           June 2017 Details    Sun Mon Tue Wed Thu Fri Sat         1      15 mg         2      10 mg         3      15 mg           4      15 mg         5      10 mg         6      15 mg         7            8               9               10                 11               12               13               14               15               16               17                 18               19               20               21                22               23               24                 25               26               27               28               29               30                 Date Details   No additional details    Date of next INR:  6/7/2017         How to take your warfarin dose     To take:  10 mg Take 2 of the 5 mg tablets.    To take:  15 mg Take 3 of the 5 mg tablets.

## 2017-05-04 NOTE — PROGRESS NOTES
Progress Note  Disclaimer: This note consists of symbols derived from keyboarding, dictation and/or voice recognition software. As a result, there may be errors in the script that have gone undetected. Please consider this when interpreting information found in this chart.    Client Name: Bia iBll  Date: 5/3/2017         Service Type: Individual      Session Start Time: 8:30 AM  Session End Time: 9:15 AM      Session Length: 45     Session #: 12     Attendees: Client attended alone    Treatment Plan Last Reviewed: 3/1/2017  PHQ-9 / CHIN-7 : 12     DATA   Client reported A very good week. Her boyfriend ispushing for an engagement but she is taking her time  And wanting to get things right. She'll be spending some time with her sister today and is looking forward to doing some quilting.     Progress Since Last Session (Related to Symptoms / Goals / Homework):   Symptoms: Improved    Homework: Achieved / completed to satisfaction      Episode of Care Goals: Satisfactory progress - ACTION (Actively working towards change); Intervened by reinforcing change plan / affirming steps taken     Current / Ongoing Stressors and Concerns:   Recurrent depression  family relational problems,pain from recent shoulder surgery     Treatment Objective(s) Addressed in This Session:   Increase interest, engagement, and pleasure in doing things  Decrease frequency and intensity of feeling down, depressed, hopeless       Intervention:   CBT: Behavioral activation Celebrated gains made and spent some time talking about how her relationships have taught her lessons.      ASSESSMENT: Current Emotional / Mental Status (status of significant symptoms):   Risk status (Self / Other harm or suicidal ideation)   Client denies current fears or concerns for personal safety.   Client denies current or recent suicidal ideation or behaviors.   Client denies current or recent homicidal ideation or  behaviors.   Client denies current or recent self injurious behavior or ideation.   Client denies other safety concerns.   A safety and risk management plan has not been developed at this time, however client was given the after-hours number / 911 should there be a change in any of these risk factors.     Appearance:   Appropriate    Eye Contact:   Good    Psychomotor Behavior: Normal    Attitude:   Cooperative    Orientation:   All   Speech    Rate / Production: Normal     Volume:  Normal    Mood:    Normal   Affect:    Appropriate    Thought Content:  Clear    Thought Form:  Coherent  Logical    Insight:    Good      Medication Review:   No changes to current psychiatric medication(s)     Medication Compliance:   Yes     Changes in Health Issues:   None reported     Chemical Use Review:   Substance Use: Chemical use reviewed, no active concerns identified      Tobacco Use: No current tobacco use.       Collateral Reports Completed:   Not Applicable    PLAN: (Client Tasks / Therapist Tasks / Other)  Client to continue sticking to her boundaries with children and other family members. Spend some time taking care of herself instead of rushing to take care of others.    Kd Morris                                                         ________________________________________________________________________    Treatment Plan    Client's Name: Bia Bill  YOB: 1965    Date: 3/1/2017      DSM5 Diagnoses: (Sustained by DSM5 Criteria Listed Above)  Diagnoses: 296.40 Bipolar I Disorder Current or Most Recent Episode Manic, Unspecified  Psychosocial & Contextual Factors: financial difficulties, chronic pain, roommate issues  WHODAS 2.0 (12 item)               This questionnaire asks about difficulties due to health conditions. Health conditions             include disease or illnesses, other health problems that may be short or long lasting,             injuries, mental health or emotional  problems, and problems with alcohol or drugs.                         Think back over the past 30 days and answer these questions, thinking about how much             difficulty you had doing the following activities. For each question, please [place] only             one response.      S1  Standing for long periods such as 30 minutes?  Mild =           2    S2  Taking care of household responsibilities?  Moderate =   3    S3  Learning a new task, for example, learning how to get to a new place?  Mild =           2    S4  How much of a problem do you have joining community activities (for example, festivals, Religion or other activities) in the same way as anyone else can?  Moderate =   3    S5  How much have you been emotionally affected by your health problems?  Mild =           2        In the past 30 days, how much difficulty did you have in:    S6  Concentrating on doing something for ten minutes?  Mild =           2    S7  Walking a long distance such as a kilometer (or equivalent)?  Severe =       4    S8  Washing your whole body?  None =         1    S9  Getting dressed?  None =         1    S10  Dealing with people you do not know?  Moderate =   3    S11  Maintaining a friendship?  Severe =       4    S12  Your day to day work?  Moderate =   3       H1  Overall, in the past 30 days, how many days were these difficulties present?  Record number of days seven    H2  In the past 30 days, for how many days were you totally unable to carry out your usual activities or work because of any health condition?  Record number of days  seven    H3  In the past 30 days, not counting the days that you were totally unable, for how many days did you cut back or reduce your usual activities or work because of any health condition?  Record number of days five                   Referral / Collaboration:  Referral to another professional/service is not indicated at this time..    Anticipated number of session or this episode  of care: 15    Goals  1. Education- the Biopsychosocial model of depression  a. Client will be able to describe how depression is effecting them physically, emotionally and socially  2. Behavioral Activation  a. Client will learn to assess their depression on a day to day basis  b. Client will Identify two forms of exercise/activity and engage in them 3 times per week  c. Client will Identify 3 things that make them laugh, and engage in these 5 times per week.  d. Client will Identify 1-2Creative activities or hobbies  and engage in them 2 times per week  e. Client will identify music, movies, books that make them feel good and use them 3-4 times per week  3. Self-care  a. Client will identify 5 things they can do just for themselves  b. Client will take time for quiet, reflection, meditation 5 times per week  c. Client will Learn to set boundaries when appropriate  d. Client will Identify 2 individuals they can call on for support, distraction  4. Assessment of progress  a. Client will engage in assessment of their progress on a regular basis    Bipolar Disorder  Treatment plan:  1. Education- the Biopsychosocial model of Bipolar Disorder    a. Client will be able to describe in general terms what Bipolar Disorder  is and is not  b. Client will be able to describe how Bipolar Disorder  has affected their life in at least two different areas, such as school or work and Home/relationships  c. Clients parents/guardians or significant others will be provided information on what Bipolar Disorder  is and the ways it can affect relationships and be encouraged to be a part of clients treatment team.  2. Medication  a. Client will participate in medication evaluation for Bipolar Disorder  symptoms and follow medication recommendations.   3. Identification and management of triggers  a. Client and therapist will examine patient s history to determine if there are predictable triggers for manic or depressive episodes (e.g.  boredom, anger, family stress)  b. Client and therapist will develop means of diffusing these triggers (e.g. relaxation strategies, boundary setting, anger management)  4. Comorbid conditions   a. Client and therapist will asses for comorbid conditions (e.g. anxiety, depression, substance use). and add additional items to treatment plan as needed  5. Self-care  a. Client will identify 3 things they can do just for themselves  b. Client will take time for quiet, reflection, meditation 3 times per week  c. Client will Learn to set boundaries when appropriate  d. Client will Identify 2 individuals they can call on for support, distraction  5. Behavioral Activation  a. Client will Identify two forms of exercise/activity and engage in them 3 times per week  b. Client will Identify 2 things that make them laugh, and engage in these 3 times per week.  c. Client will Identify 2 Creative activities or hobbies  and engage in them 2 times per week  d. Client will identify music, movies, books that make them feel good and use them 3 times per week  6. Assessment of progress  a. Client will engage in assessment of their progress on a regular basis    Client has reviewed and agreed to the above plan.      Kd Morris  3/1/2017

## 2017-05-09 ENCOUNTER — HOSPITAL ENCOUNTER (OUTPATIENT)
Dept: PHYSICAL THERAPY | Facility: CLINIC | Age: 52
Setting detail: THERAPIES SERIES
End: 2017-05-09
Attending: PHYSICIAN ASSISTANT
Payer: COMMERCIAL

## 2017-05-09 PROCEDURE — 97110 THERAPEUTIC EXERCISES: CPT | Mod: GP | Performed by: PHYSICAL THERAPIST

## 2017-05-09 PROCEDURE — 40000718 ZZHC STATISTIC PT DEPARTMENT ORTHO VISIT: Performed by: PHYSICAL THERAPIST

## 2017-05-09 PROCEDURE — 97140 MANUAL THERAPY 1/> REGIONS: CPT | Mod: GP | Performed by: PHYSICAL THERAPIST

## 2017-05-11 DIAGNOSIS — I73.9 PVD (PERIPHERAL VASCULAR DISEASE) WITH CLAUDICATION (H): ICD-10-CM

## 2017-05-12 RX ORDER — GABAPENTIN 600 MG/1
TABLET ORAL
Qty: 90 TABLET | Refills: 0 | Status: SHIPPED | OUTPATIENT
Start: 2017-05-12 | End: 2017-05-30

## 2017-05-12 NOTE — TELEPHONE ENCOUNTER
gabapentin (NEURONTIN) 600 MG tablet      Last Written Prescription Date: 3/16/17  Last Fill Quantity: 90,  # refills: 1   Last Office Visit with G, [facility] or [facility] prescribing provider: 3/16/17                                         Next 5 appointments (look out 90 days)     May 17, 2017  8:30 AM CDT   Return Visit with [facility] ([facility])    [address] [address]   [phone]            Jun 07, 2017  8:30 AM CDT   Return Visit with [facility] ([facility])    [address] [address]   [phone]            Jul 10, 2017  1:15 PM CDT   Return Visit with Patel Rice MD   [facility] [place] ([facility] [place])    [address] [address]  [place] [address]   [phone]

## 2017-05-12 NOTE — TELEPHONE ENCOUNTER
Prescription approved per [facility] Refill Protocol.  Aubree Richardson RN

## 2017-05-16 ENCOUNTER — HOSPITAL ENCOUNTER (OUTPATIENT)
Dept: PHYSICAL THERAPY | Facility: CLINIC | Age: 52
Setting detail: THERAPIES SERIES
End: 2017-05-16
Attending: PHYSICIAN ASSISTANT
Payer: COMMERCIAL

## 2017-05-16 PROCEDURE — 40000718 ZZHC STATISTIC PT DEPARTMENT ORTHO VISIT

## 2017-05-16 PROCEDURE — 97110 THERAPEUTIC EXERCISES: CPT | Mod: GP

## 2017-05-16 PROCEDURE — 97140 MANUAL THERAPY 1/> REGIONS: CPT | Mod: GP

## 2017-05-17 ENCOUNTER — OFFICE VISIT (OUTPATIENT)
Dept: PSYCHOLOGY | Facility: CLINIC | Age: 52
End: 2017-05-17
Payer: COMMERCIAL

## 2017-05-17 DIAGNOSIS — F31.62 MODERATE MIXED BIPOLAR I DISORDER (H): Primary | ICD-10-CM

## 2017-05-17 PROCEDURE — 90834 PSYTX W PT 45 MINUTES: CPT | Performed by: PSYCHOLOGIST

## 2017-05-17 NOTE — MR AVS SNAPSHOT
MRN:[id number]                      After Visit Summary   5/17/2017    Bia Bill    MRN: [id number]           Visit Information        Provider Department      5/17/2017 8:30 AM Kd Morris [facility] Generic      Your next 10 appointments already scheduled     May 26, 2017  2:00 PM CDT   Ortho Treatment with Orquidea Orellana PT   [facility] ([facility])    [address] [place] [address]   [phone]            Jun 07, 2017  8:30 AM CDT   Return Visit with Kd Morris   [facility] ([facility])    [address] [address]   [phone]            Jun 07, 2017 10:00 AM CDT   LAB with [facility] ([facility])    [address] [address]   [phone]           Patient must bring picture ID.  Patient should be prepared to give a urine specimen  Please do not eat 10-12 hours before your appointment if you are coming in fasting for labs on lipids, cholesterol, or glucose (sugar).  Pregnant women should follow their Care Team instructions. Water with medications is okay. Do not drink coffee or other fluids.   If you have concerns about taking  your medications, please ask at office or if scheduling via [employer], send a message by clicking on Secure Messaging, Message Your Care Team.            Jun 21, 2017  8:30 AM CDT   Return Visit with Kd Morris   [facility] ([facility])    [address] [address]   [phone]            Jul 05, 2017 10:00 AM CDT   LAB with [facility] [facility]   [facility] ([facility])    [address] [address]   [phone]           Patient must bring picture ID.  Patient should be prepared to give a urine specimen  Please do not eat 10-12 hours before your appointment if you are  coming in fasting for labs on lipids, cholesterol, or glucose (sugar).  Pregnant women should follow their Care Team instructions. Water with medications is okay. Do not drink coffee or other fluids.   If you have concerns about taking  your medications, please ask at office or if scheduling via [employer], send a message by clicking on Secure Messaging, Message Your Care Team.            Jul 10, 2017  1:15 PM CDT   Return Visit with Patel Rice MD   [facility] [place] ([facility] [place])    [address]  [place] [address]   [phone]            Aug 02, 2017 10:00 AM CDT   LAB with [facility] ([facility])    [address] [facility] [address]   [phone]           Patient must bring picture ID.  Patient should be prepared to give a urine specimen  Please do not eat 10-12 hours before your appointment if you are coming in fasting for labs on lipids, cholesterol, or glucose (sugar).  Pregnant women should follow their Care Team instructions. Water with medications is okay. Do not drink coffee or other fluids.   If you have concerns about taking  your medications, please ask at office or if scheduling via [employer], send a message by clicking on Secure Messaging, Message Your Care Team.            Sep 06, 2017 10:00 AM CDT   LAB with [facility] [facility]   [facility] ([facility])    [address] [facility] [address]   [phone]           Patient must bring picture ID.  Patient should be prepared to give a urine specimen  Please do not eat 10-12 hours before your appointment if you are coming in fasting for labs on lipids, cholesterol, or glucose (sugar).  Pregnant women should follow their Care Team instructions. Water with medications is okay. Do not drink coffee or other fluids.   If you have concerns about taking  your medications, please ask at office or if scheduling  "via [employer], send a message by clicking on Secure Messaging, Message Your Care Team.            Sep 22, 2017  1:00 PM CDT   Return Visit with Joshua Watts MD   [facility] ([facility])    [facility]  [address]  [facility] [address]   [phone]              [employer] Information     [employer] lets you send messages to your doctor, view your test results, renew your prescriptions, schedule appointments and more. To sign up, go to [email or web address] . Click on \"Log in\" on the left side of the screen, which will take you to the Welcome page. Then click on \"Sign up Now\" on the right side of the page.     You will be asked to enter the access code listed below, as well as some personal information. Please follow the directions to create your username and password.     Your access code is: [email or web address]  Expires: 7/3/2017  3:48 PM     Your access code will  in 90 days. If you need help or a new code, please call your [facility] clinic or [phone].        Care EveryWhere ID     This is your Care EveryWhere ID. This could be used by other organizations to access your [facility] medical records  [id number]        "

## 2017-05-17 NOTE — PROGRESS NOTES
Progress Note  Disclaimer: This note consists of symbols derived from keyboarding, dictation and/or voice recognition software. As a result, there may be errors in the script that have gone undetected. Please consider this when interpreting information found in this chart.    Client Name: Bia Bill  Date:  5/17/2017         Service Type: Individual      Session Start Time: 8:30 AM  Session End Time: 9:15 AM      Session Length: 45     Session #:  13     Attendees: Client attended alone    Treatment Plan Last Reviewed: 3/1/2017  PHQ-9 / CHIN-7 : 9     DATA   Client reported She spent Mother's Day alone doing yard work, had talked to her daughter on the phone the day before. Her boyfriend is about to deploy to [place] for 6 months and they are planning on getting  when he gets back. Spent some time processing what other she feels things are going too fast or not. Reports she feels better than she has in many years.     Progress Since Last Session (Related to Symptoms / Goals / Homework):   Symptoms: Improved    Homework: Achieved / completed to satisfaction      Episode of Care Goals: Satisfactory progress - ACTION (Actively working towards change); Intervened by reinforcing change plan / affirming steps taken     Current / Ongoing Stressors and Concerns:   Recurrent depression  family relational problems,pain from recent shoulder surgery     Treatment Objective(s) Addressed in This Session:   Increase interest, engagement, and pleasure in doing things  Decrease frequency and intensity of feeling down, depressed, hopeless       Intervention:   CBT: Behavioral activation Celebrated gains made and spent some time talking about how her relationships have taught her lessons.      ASSESSMENT: Current Emotional / Mental Status (status of significant symptoms):   Risk status (Self / Other harm or suicidal ideation)   Client denies current fears or concerns for  personal safety.   Client denies current or recent suicidal ideation or behaviors.   Client denies current or recent homicidal ideation or behaviors.   Client denies current or recent self injurious behavior or ideation.   Client denies other safety concerns.   A safety and risk management plan has not been developed at this time, however client was given the after-hours number / 911 should there be a change in any of these risk factors.     Appearance:   Appropriate    Eye Contact:   Good    Psychomotor Behavior: Normal    Attitude:   Cooperative    Orientation:   All   Speech    Rate / Production: Normal     Volume:  Normal    Mood:    Normal   Affect:    Appropriate    Thought Content:  Clear    Thought Form:  Coherent  Logical    Insight:    Good      Medication Review:   No changes to current psychiatric medication(s)     Medication Compliance:   Yes     Changes in Health Issues:   None reported     Chemical Use Review:   Substance Use: Chemical use reviewed, no active concerns identified      Tobacco Use: No current tobacco use.       Collateral Reports Completed:   Not Applicable    PLAN: (Client Tasks / Therapist Tasks / Other)  Client to continue With her self-care routine and maintenance of boundaries. She is getting a lot of pressure from her sister to stay around, likely to take care of her. Client will need  Do what she feels is best for herself as well as maintaining boundaries around her sister's alcohol and drug use.    Kd Morris                                                         ________________________________________________________________________    Treatment Plan    Client's Name: Bia Bill  YOB: 1965    Date: 3/1/2017      DSM5 Diagnoses: (Sustained by DSM5 Criteria Listed Above)  Diagnoses: 296.40 Bipolar I Disorder Current or Most Recent Episode Manic, Unspecified  Psychosocial & Contextual Factors: financial difficulties, chronic pain, roommate  issues  WHODAS 2.0 (12 item)               This questionnaire asks about difficulties due to health conditions. Health conditions             include disease or illnesses, other health problems that may be short or long lasting,             injuries, mental health or emotional problems, and problems with alcohol or drugs.                         Think back over the past 30 days and answer these questions, thinking about how much             difficulty you had doing the following activities. For each question, please [place] only             one response.      S1  Standing for long periods such as 30 minutes?  Mild =           2    S2  Taking care of household responsibilities?  Moderate =   3    S3  Learning a new task, for example, learning how to get to a new place?  Mild =           2    S4  How much of a problem do you have joining community activities (for example, festivals, [employer] or other activities) in the same way as anyone else can?  Moderate =   3    S5  How much have you been emotionally affected by your health problems?  Mild =           2        In the past 30 days, how much difficulty did you have in:    S6  Concentrating on doing something for ten minutes?  Mild =           2    S7  Walking a long distance such as a kilometer (or equivalent)?  Severe =       4    S8  Washing your whole body?  None =         1    S9  Getting dressed?  None =         1    S10  Dealing with people you do not know?  Moderate =   3    S11  Maintaining a friendship?  Severe =       4    S12  Your day to day work?  Moderate =   3       H1  Overall, in the past 30 days, how many days were these difficulties present?  Record number of days seven    H2  In the past 30 days, for how many days were you totally unable to carry out your usual activities or work because of any health condition?  Record number of days  seven    H3  In the past 30 days, not counting the days that you were totally unable, for how many days did you  cut back or reduce your usual activities or work because of any health condition?  Record number of days five                   Referral / Collaboration:  Referral to another professional/service is not indicated at this time..    Anticipated number of session or this episode of care: 15    Goals  1. Education- the Biopsychosocial model of depression  a. Client will be able to describe how depression is effecting them physically, emotionally and socially  2. Behavioral Activation  a. Client will learn to assess their depression on a day to day basis  b. Client will Identify two forms of exercise/activity and engage in them 3 times per week  c. Client will Identify 3 things that make them laugh, and engage in these 5 times per week.  d. Client will Identify 1-2Creative activities or hobbies  and engage in them 2 times per week  e. Client will identify music, movies, books that make them feel good and use them 3-4 times per week  3. Self-care  a. Client will identify 5 things they can do just for themselves  b. Client will take time for quiet, reflection, meditation 5 times per week  c. Client will Learn to set boundaries when appropriate  d. Client will Identify 2 individuals they can call on for support, distraction  4. Assessment of progress  a. Client will engage in assessment of their progress on a regular basis    Bipolar Disorder  Treatment plan:  1. Education- the Biopsychosocial model of Bipolar Disorder    a. Client will be able to describe in general terms what Bipolar Disorder  is and is not  b. Client will be able to describe how Bipolar Disorder  has affected their life in at least two different areas, such as school or work and Home/relationships  c. Clients parents/guardians or significant others will be provided information on what Bipolar Disorder  is and the ways it can affect relationships and be encouraged to be a part of clients treatment team.  2. Medication  a. Client will participate in  medication evaluation for Bipolar Disorder  symptoms and follow medication recommendations.   3. Identification and management of triggers  a. Client and therapist will examine patient s history to determine if there are predictable triggers for manic or depressive episodes (e.g. boredom, anger, family stress)  b. Client and therapist will develop means of diffusing these triggers (e.g. relaxation strategies, boundary setting, anger management)  4. Comorbid conditions   a. Client and therapist will asses for comorbid conditions (e.g. anxiety, depression, substance use). and add additional items to treatment plan as needed  5. Self-care  a. Client will identify 3 things they can do just for themselves  b. Client will take time for quiet, reflection, meditation 3 times per week  c. Client will Learn to set boundaries when appropriate  d. Client will Identify 2 individuals they can call on for support, distraction  5. Behavioral Activation  a. Client will Identify two forms of exercise/activity and engage in them 3 times per week  b. Client will Identify 2 things that make them laugh, and engage in these 3 times per week.  c. Client will Identify 2 Creative activities or hobbies  and engage in them 2 times per week  d. Client will identify music, movies, books that make them feel good and use them 3 times per week  6. Assessment of progress  a. Client will engage in assessment of their progress on a regular basis    Client has reviewed and agreed to the above plan.      Kd Morris  3/1/2017

## 2017-05-26 ENCOUNTER — HOSPITAL ENCOUNTER (OUTPATIENT)
Dept: PHYSICAL THERAPY | Facility: CLINIC | Age: 52
Setting detail: THERAPIES SERIES
End: 2017-05-26
Attending: PHYSICIAN ASSISTANT
Payer: COMMERCIAL

## 2017-05-26 PROCEDURE — 97110 THERAPEUTIC EXERCISES: CPT | Mod: GP | Performed by: PHYSICAL THERAPIST

## 2017-05-26 PROCEDURE — 40000718 ZZHC STATISTIC PT DEPARTMENT ORTHO VISIT: Performed by: PHYSICAL THERAPIST

## 2017-05-30 ENCOUNTER — OFFICE VISIT (OUTPATIENT)
Dept: FAMILY MEDICINE | Facility: CLINIC | Age: 52
End: 2017-05-30
Payer: COMMERCIAL

## 2017-05-30 VITALS
HEART RATE: 73 BPM | OXYGEN SATURATION: 96 % | BODY MASS INDEX: 46.41 KG/M2 | DIASTOLIC BLOOD PRESSURE: 88 MMHG | TEMPERATURE: 98 F | HEIGHT: 59 IN | WEIGHT: 230.2 LBS | SYSTOLIC BLOOD PRESSURE: 138 MMHG

## 2017-05-30 DIAGNOSIS — T78.2XXD ANAPHYLACTIC REACTION TO BEE STING, UNDETERMINED INTENT, SUBSEQUENT ENCOUNTER: ICD-10-CM

## 2017-05-30 DIAGNOSIS — J45.30 MILD PERSISTENT ASTHMA WITHOUT COMPLICATION: ICD-10-CM

## 2017-05-30 DIAGNOSIS — G25.81 RESTLESS LEG SYNDROME: Primary | ICD-10-CM

## 2017-05-30 DIAGNOSIS — F33.1 MAJOR DEPRESSIVE DISORDER, RECURRENT EPISODE, MODERATE (H): ICD-10-CM

## 2017-05-30 DIAGNOSIS — T63.444D ANAPHYLACTIC REACTION TO BEE STING, UNDETERMINED INTENT, SUBSEQUENT ENCOUNTER: ICD-10-CM

## 2017-05-30 DIAGNOSIS — I73.9 PVD (PERIPHERAL VASCULAR DISEASE) WITH CLAUDICATION (H): ICD-10-CM

## 2017-05-30 PROCEDURE — 99214 OFFICE O/P EST MOD 30 MIN: CPT | Performed by: FAMILY MEDICINE

## 2017-05-30 RX ORDER — LAMOTRIGINE 200 MG/1
200 TABLET ORAL DAILY
Qty: 90 TABLET | Refills: 3 | Status: SHIPPED | OUTPATIENT
Start: 2017-05-30 | End: 2018-06-20

## 2017-05-30 RX ORDER — GABAPENTIN 600 MG/1
600 TABLET ORAL 3 TIMES DAILY
Qty: 90 TABLET | Refills: 3 | Status: SHIPPED | OUTPATIENT
Start: 2017-05-30 | End: 2017-09-13

## 2017-05-30 RX ORDER — EPINEPHRINE 0.3 MG/.3ML
0.3 INJECTION SUBCUTANEOUS
Qty: 0.6 ML | Refills: 0 | Status: SHIPPED | OUTPATIENT
Start: 2017-05-30 | End: 2018-05-02

## 2017-05-30 RX ORDER — GABAPENTIN 300 MG/1
300 CAPSULE ORAL AT BEDTIME
Qty: 90 CAPSULE | Refills: 3 | Status: SHIPPED | OUTPATIENT
Start: 2017-05-30 | End: 2017-11-09

## 2017-05-30 RX ORDER — ARIPIPRAZOLE 5 MG/1
7.5 TABLET ORAL AT BEDTIME
Qty: 180 TABLET | Refills: 3 | Status: SHIPPED | OUTPATIENT
Start: 2017-05-30 | End: 2018-06-12

## 2017-05-30 ASSESSMENT — ANXIETY QUESTIONNAIRES
GAD7 TOTAL SCORE: 6
5. BEING SO RESTLESS THAT IT IS HARD TO SIT STILL: SEVERAL DAYS
6. BECOMING EASILY ANNOYED OR IRRITABLE: MORE THAN HALF THE DAYS
2. NOT BEING ABLE TO STOP OR CONTROL WORRYING: NOT AT ALL
7. FEELING AFRAID AS IF SOMETHING AWFUL MIGHT HAPPEN: NOT AT ALL
3. WORRYING TOO MUCH ABOUT DIFFERENT THINGS: SEVERAL DAYS
1. FEELING NERVOUS, ANXIOUS, OR ON EDGE: SEVERAL DAYS

## 2017-05-30 ASSESSMENT — PATIENT HEALTH QUESTIONNAIRE - PHQ9: 5. POOR APPETITE OR OVEREATING: SEVERAL DAYS

## 2017-05-30 NOTE — NURSING NOTE
"Chief Complaint   Patient presents with     Musculoskeletal Problem     Left leg pain follow up. Neurontin not working as well as it did in the past. Also, patient feels she may have restless leg syndrome.      Psychiatric Problem     Bipolar medication follow up.  Needs refills todays.        Initial /72 (BP Location: Right arm, Patient Position: Chair, Cuff Size: Adult Large)  Pulse 73  Temp 98  F (36.7  C) (Tympanic)  Ht 4' 11\" (1.499 m)  Wt 230 lb 3.2 oz (104.4 kg)  LMP 09/27/2009  SpO2 96%  BMI 46.49 kg/m2 Estimated body mass index is 46.49 kg/(m^2) as calculated from the following:    Height as of this encounter: 4' 11\" (1.499 m).    Weight as of this encounter: 230 lb 3.2 oz (104.4 kg).  Medication Reconciliation: complete   Kalyn Dye LPN  "

## 2017-05-30 NOTE — PROGRESS NOTES
SUBJECTIVE:                                                    Bia Bill is a 51 year old female who presents to clinic today for the following health issues:    Chief Complaint   Patient presents with     Musculoskeletal Problem     Left leg pain follow up. Neurontin not working as well as it did in the past. Also, patient feels she may have restless leg syndrome.      Psychiatric Problem     Bipolar medication follow up.  Needs refills todays.        Problem list and histories reviewed & adjusted, as indicated.  Additional history:     Patient Active Problem List   Diagnosis     Mild persistent asthma     Polyneuropathy in other diseases classified elsewhere (H)     DVT (deep venous thrombosis) (H)     Alcohol abuse, in remission     SECONDARY POLYCYTHEMIA     Chondromalacia of patella     Sensorineural hearing loss, asymmetrical     Developmental reading disorder     Esophageal reflux     Hypothyroidism     Fatty liver     GILES (Obstructive Sleep Apnea)-Moderate (AHI 16)     RLS (restless legs syndrome)     CARDIOVASCULAR SCREENING; LDL GOAL LESS THAN 100     Smoker     Erythrocytosis     Moderate mixed bipolar I disorder (H)     Personality disorder, depressive     COPD (chronic obstructive pulmonary disease) (H)     Current use of long term anticoagulation     Rosacea     Health Care Home     DDD (degenerative disc disease), cervical     Benign neoplasm of colon (POLYPOSIS)     Stroke (H)     Somatization disorder     Acute gouty arthritis     Sebaceous cyst of right axilla     HTN, goal below 140/90     Left leg pain     Right shoulder pain     Headache     Vitamin D deficiency     Long term current use of anticoagulant therapy     Morbid obesity (H)     PVD (peripheral vascular disease) with claudication (H)     Trochanteric bursitis of both hips     Cyst of left ovary     Acute bronchospasm     Morbid obesity with alveolar hypoventilation (H)     Past Surgical History:   Procedure Laterality Date      "BILIARY STENT SINGLE USE COV      3 stints in left leg     BONE MARROW BIOPSY, BONE SPECIMEN, NEEDLE/TROCAR N/A 11/17/2014    Procedure: BIOPSY BONE MARROW;  Surgeon: Hay Aaron MD;  Location: [facility]     D & C  10/26/09    with uterine ablation     ESOPHAGOSCOPY, GASTROSCOPY, DUODENOSCOPY (EGD), COMBINED  4/21/2014    Procedure: Gastroscopy;  Surgeon: Moris Thomas MD;  Location: [facility]     HYSTERECTOMY, PAP NO LONGER INDICATED  1-4-2010     LITHOTRIPSY  2004    Lithotrypsy       Social History   Substance Use Topics     Smoking status: Current Every Day Smoker     Packs/day: 0.50     Years: 34.00     Types: Cigarettes     Smokeless tobacco: Never Used      Comment: started at age 11.  Quit during pregnancyX4.  0.5 ppd (6/2016)     Alcohol use No      Comment: quit 1995     Family History   Problem Relation Age of Onset     Hypertension Mother      DIABETES Mother      Blood Disease Mother      HEART DISEASE Mother      chf     CEREBROVASCULAR DISEASE Mother      Hypertension Father      CANCER Father      lung cancer     Allergies Sister      Depression Sister      Hypertension Sister          Has meet with [email or web address] and reports doing well.  Stable on meds  Has bit of resless leg at nighttime.   ROS:  Constitutional, HEENT, cardiovascular, pulmonary, gi and gu systems are negative, except as otherwise noted.    OBJECTIVE:                                                    /88  Pulse 73  Temp 98  F (36.7  C) (Tympanic)  Ht 4' 11\" (1.499 m)  Wt 230 lb 3.2 oz (104.4 kg)  LMP 09/27/2009  SpO2 96%  BMI 46.49 kg/m2 Body mass index is 46.49 kg/(m^2).   GENERAL: healthy, alert, well nourished, well hydrated, no distress  HENT: ear canals- normal; TMs- normal; Nose- normal; Mouth- no ulcers, no lesions  NECK: no tenderness, no adenopathy, no asymmetry, no masses, no stiffness; thyroid- normal to palpation  RESP: lungs clear to auscultation - no rales, no rhonchi, no wheezes  CV: regular " rates and rhythm, normal S1 S2, no S3 or S4 and no murmur, no click or rub -  ABDOMEN: soft, no tenderness, no  hepatosplenomegaly, no masses, normal bowel sounds       ASSESSMENT/PLAN:                                                      (G25.81) Restless leg syndrome  (primary encounter diagnosis)  Will increase pm dose  Plan: gabapentin (NEURONTIN) 300 MG capsule            (F33.1) Major depressive disorder, recurrent episode, moderate (H)  Comment: Good control.  Continue currant medications, continue to monito much better control   Plan: lamoTRIgine (LAMICTAL) 200 MG tablet,         ARIPiprazole (ABILIFY) 5 MG tablet, gabapentin         (NEURONTIN) 300 MG capsule            (I73.9) PVD (peripheral vascular disease) with claudication (H)  Encouraged to quit smoking  She only has strong urges to smoke after mealss challanged her to decrease then to 3 cigs a day.      (J45.30) Mild persistent asthma without complication  Stable   Plan: mometasone-formoterol (DULERA) 100-5 MCG/ACT         oral inhaler            (T63.344D) Anaphylactic reaction to bee sting, undetermined intent, subsequent encounter  Plan: EPINEPHrine 0.3 MG/0.3ML injection               reports that she has been smoking Cigarettes.  She has a 17.00 pack-year smoking history. She has never used smokeless tobacco.  Tobacco Cessation Action Plan: Information offered: Patient not interested at this time    Weight management plan: Discussed healthy diet and exercise guidelines and patient will follow up in 6 months in clinic to re-evaluate.    Patient should return to clinic for office visit in 6 months.    Kd Leong MD    [facility]

## 2017-05-30 NOTE — MR AVS SNAPSHOT
After Visit Summary   5/30/2017    Bia Bill    MRN: [id number]           Patient Information     Date Of Birth          1965        Visit Information        Provider Department      5/30/2017 1:20 PM Kd Leong MD [facility]        Today's Diagnoses     Restless leg syndrome    -  1    Major depressive disorder, recurrent episode, moderate (H)        PVD (peripheral vascular disease) with claudication (H)        Mild persistent asthma without complication        Anaphylactic reaction to bee sting, undetermined intent, subsequent encounter           Follow-ups after your visit        Your next 10 appointments already scheduled     Jun 06, 2017  2:30 PM CDT   Ortho Treatment with Orquidea Orellana PT   [facility] ([facility])    [address] [place] [address]   [phone]            Jun 07, 2017  8:30 AM CDT   Return Visit with Kd Morris   [facility]    [address] [facility] [address]   [phone]            Jun 07, 2017 10:00 AM CDT   LAB with [facility] ([facility])    [address] [facility] [address]   [phone]           Patient must bring picture ID.  Patient should be prepared to give a urine specimen  Please do not eat 10-12 hours before your appointment if you are coming in fasting for labs on lipids, cholesterol, or glucose (sugar).  Pregnant women should follow their Care Team instructions. Water with medications is okay. Do not drink coffee or other fluids.   If you have concerns about taking  your medications, please ask at office or if scheduling via [employer], send a message by clicking on Secure Messaging, Message Your Care Team.            Jun 21, 2017  8:30 AM CDT   Return Visit with Kd Morris   [facility] ([facility]    [address] [facility]  [address]   [phone]            Jul 05, 2017 10:00 AM CDT   LAB with [facility] ([facility])    [address] [facility] [address]   [phone]           Patient must bring picture ID.  Patient should be prepared to give a urine specimen  Please do not eat 10-12 hours before your appointment if you are coming in fasting for labs on lipids, cholesterol, or glucose (sugar).  Pregnant women should follow their Care Team instructions. Water with medications is okay. Do not drink coffee or other fluids.   If you have concerns about taking  your medications, please ask at office or if scheduling via [employer], send a message by clicking on Secure Messaging, Message Your Care Team.            Jul 10, 2017  1:15 PM CDT   Return Visit with Patel Rice MD   [facility] [place] ([facility] [place])    [address]  [place] [address]   [phone]            Aug 02, 2017 10:00 AM CDT   [place] [facility]   [facility] ([facility])    [address] [facility] [address]   [phone]           Patient must bring picture ID.  Patient should be prepared to give a urine specimen  Please do not eat 10-12 hours before your appointment if you are coming in fasting for labs on lipids, cholesterol, or glucose (sugar).  Pregnant women should follow their Care Team instructions. Water with medications is okay. Do not drink coffee or other fluids.   If you have concerns about taking  your medications, please ask at office or if scheduling via [employer], send a message by clicking on Secure Messaging, Message Your Care Team.            Sep 06, 2017 10:00 AM CDT   LAB with [facility] ([facility])    [address] [facility] [address]   [phone]           Patient must bring picture ID.  Patient should be prepared to give a urine specimen   "Please do not eat 10-12 hours before your appointment if you are coming in fasting for labs on lipids, cholesterol, or glucose (sugar).  Pregnant women should follow their Care Team instructions. Water with medications is okay. Do not drink coffee or other fluids.   If you have concerns about taking  your medications, please ask at office or if scheduling via [employer], send a message by clicking on Secure Messaging, Message Your Care Team.            Sep 22, 2017  1:00 PM CDT   Return Visit with Joshua Watts MD   [facility] ([facility])    [facility]  [address] [address]  [facility] [address] [phone]              Who to contact     Normal or non-critical lab and imaging results will be communicated to you by [employer], letter or phone within 4 business days after the clinic has received the results. If you do not hear from us within 7 days, please contact the clinic through [employer] or phone. If you have a critical or abnormal lab result, we will notify you by phone as soon as possible.  Submit refill requests through [employer] or call your pharmacy and they will forward the refill request to us. Please allow 3 business days for your refill to be completed.          If you need to speak with a  for additional information , please call: [phone]             Additional Information About Your Visit        [employer] Information     [employer] lets you send messages to your doctor, view your test results, renew your prescriptions, schedule appointments and more. To sign up, go to [email or web address] . Click on \"Log in\" on the left side of the screen, which will take you to the Welcome page. Then click on \"Sign up Now\" on the right side of the page.     You will be asked to enter the access code listed below, as well as some personal information. Please follow the directions to create your username and password.     Your access code is: " [email or web address]  Expires: 7/3/2017  3:48 PM     Your access code will  in 90 days. If you need help or a new code, please call your [place] clinic or [phone].        Care EveryWhere ID     This is your Care EveryWhere ID. This could be used by other organizations to access your [place] medical records  [id number]        Your Vitals Were     Pulse Temperature Height Last Period Pulse Oximetry BMI (Body Mass Index)    73 98  F (36.7  C) (Tympanic) 4' 11\" (1.499 m) 2009 96% 46.49 kg/m2       Blood Pressure from Last 3 Encounters:   17 138/88   17 125/68   17 144/75    Weight from Last 3 Encounters:   17 230 lb 3.2 oz (104.4 kg)   17 231 lb 8 oz (105 kg)   17 227 lb (103 kg)              Today, you had the following     No orders found for display         Today's Medication Changes          These changes are accurate as of: 17  3:49 PM.  If you have any questions, ask your nurse or doctor.               These medicines have changed or have updated prescriptions.        Dose/Directions    * gabapentin 600 MG tablet   Commonly known as:  NEURONTIN   This may have changed:  See the new instructions.   Used for:  PVD (peripheral vascular disease) with claudication (H)   Changed by:  Kd Leong MD        Dose:  600 mg   Take 1 tablet (600 mg) by mouth 3 times daily   Quantity:  90 tablet   Refills:  3       * gabapentin 300 MG capsule   Commonly known as:  NEURONTIN   This may have changed:  You were already taking a medication with the same name, and this prescription was added. Make sure you understand how and when to take each.   Used for:  Major depressive disorder, recurrent episode, moderate (H), Restless leg syndrome   Changed by:  Kd Leong MD        Dose:  300 mg   Take 1 capsule (300 mg) by mouth At Bedtime Take with 600mg at bedtime. For total dose of 600mg in am 600 at noon and 900mg at bedtime   Quantity:  90 capsule   Refills:  3       * " Notice:  This list has 2 medication(s) that are the same as other medications prescribed for you. Read the directions carefully, and ask your doctor or other care provider to review them with you.         Where to get your medicines      These medications were sent to [id number] - [place], [place] - [address]  [address] [facility] [address]    Hours:  Closed 10-16-08 business to [facility] Phone:  [phone]     EPINEPHrine 0.3 MG/0.3ML injection    gabapentin 300 MG capsule    gabapentin 600 MG tablet    lamoTRIgine 200 MG tablet    mometasone-formoterol 100-5 MCG/ACT oral inhaler         Call your pharmacy to confirm that your medication is ready for pickup. It may take up to 24 hours for them to receive the prescription. If the prescription is not ready within 3 business days, please contact your clinic or your provider.     We will let you know when these medications are ready. If you don't hear back within 3 business days, please contact us.     ARIPiprazole 5 MG tablet                Primary Care Provider Office Phone # Fax #    Kd Leong [phone]       [facility] [address] [facility] [address]        Thank you!     Thank you for choosing [facility]  for your care. Our goal is always to provide you with excellent care. Hearing back from our patients is one way we can continue to improve our services. Please take a few minutes to complete the written survey that you may receive in the mail after your visit with us. Thank you!             Your Updated Medication List - Protect others around you: Learn how to safely use, store and throw away your medicines at www.disposemymeds.org.          This list is accurate as of: 5/30/17  3:49 PM.  Always use your most recent med list.                   Brand Name Dispense Instructions for use    acetaminophen 500 MG tablet    TYLENOL     Take 500-1,000 mg by mouth every 6 hours as  needed for mild pain       * albuterol (2.5 MG/3ML) 0.083% neb solution     1 Box    Take 1 vial (2.5 mg) by nebulization every 6 hours as needed for shortness of breath / dyspnea or wheezing       * albuterol 108 (90 BASE) MCG/ACT Inhaler    PROAIR HFA/PROVENTIL HFA/VENTOLIN HFA    3 Inhaler    Inhale 2 puffs into the lungs every 6 hours as needed for shortness of breath / dyspnea or wheezing       allopurinol 100 MG tablet    ZYLOPRIM    90 tablet    Take 1 tablet (100 mg) by mouth daily       ARIPiprazole 5 MG tablet    ABILIFY    180 tablet    Take 1.5 tablets (7.5 mg) by mouth At Bedtime       * ASPIRIN NOT PRESCRIBED    INTENTIONAL     by Other route continuous prn Reported on 3/24/2017       buPROPion 150 MG 24 hr tablet    WELLBUTRIN XL    90 tablet    Take 1 tablet (150 mg) by mouth every morning       cholecalciferol 1000 UNIT tablet    vitamin D     Take 1 tablet by mouth daily       EPINEPHrine 0.3 MG/0.3ML injection     0.6 mL    Inject 0.3 mLs (0.3 mg) into the muscle once as needed for anaphylaxis       furosemide 20 MG tablet    LASIX    180 tablet    Take 1 tablet (20 mg) by mouth 2 times daily       * gabapentin 600 MG tablet    NEURONTIN    90 tablet    Take 1 tablet (600 mg) by mouth 3 times daily       * gabapentin 300 MG capsule    NEURONTIN    90 capsule    Take 1 capsule (300 mg) by mouth At Bedtime Take with 600mg at bedtime. For total dose of 600mg in am 600 at noon and 900mg at bedtime       ipratropium - albuterol 0.5 mg/2.5 mg/3 mL 0.5-2.5 (3) MG/3ML neb solution    DUONEB    30 vial    Take 1 vial (3 mLs) by nebulization every 6 hours as needed for shortness of breath / dyspnea or wheezing USE THIS INSTEAD OF ALBUTEROL INHALER AS NEEDED FOR WHEEZE COUGH OR SHORTNESS OF BREATH       ketorolac 10 MG tablet    TORADOL    20 tablet    Take 1 tablet (10 mg) by mouth every 6 hours as needed       lamoTRIgine 200 MG tablet    LaMICtal    90 tablet    Take 1 tablet (200 mg) by mouth daily        "levothyroxine 150 MCG tablet    SYNTHROID/LEVOTHROID    90 tablet    Take 1 tablet (150 mcg) by mouth daily       methocarbamol 500 MG tablet    ROBAXIN     Take 500 mg by mouth       mometasone-formoterol 100-5 MCG/ACT oral inhaler    DULERA    13 g    Inhale 2 puffs into the lungs 2 times daily       omeprazole 20 MG CR capsule    priLOSEC    180 capsule    TAKE 1 CAPSULE (20 MG) BY MOUTH 2 TIMES DAILY       * order for DME      Equipment being ordered: CPAP AIRSENSE 10 5-18 CM H20 # [id number]   DN# [id number]       * order for DME     1 Units    Equipment being ordered: CPAP       * order for DME     3 Month    Equipment being ordered: CPAP supplies needed       * order for DME     1 Month    Equipment being ordered: INCONTINENCE PADS  QID       * order for DME     60 Month    Equipment being ordered: DEPENDS SIZE LARGE       * order for DME     2 Device    Equipment being ordered:   \"[id number]\" x2pair  \"20-30mmHg Farrow Hybrid Liners\" x1pair       * order for DME     2 Units    Equipment being ordered: [id number]\" x2pair   \"20-30mmHg Farrow Hybrid Liners\" x 2 pair       simvastatin 20 MG tablet    ZOCOR    90 tablet    TAKE ONE TABLET BY MOUTH AT BEDTIME       traZODone 150 MG tablet    DESYREL    90 tablet    Take 1 tablet (150 mg) by mouth At Bedtime       warfarin 5 MG tablet    COUMADIN    220 tablet    as directed by Anticoagulation Clinic, current dose 10 mg MWF, 15 mg rest of week       * Notice:  This list has 12 medication(s) that are the same as other medications prescribed for you. Read the directions carefully, and ask your doctor or other care provider to review them with you.      "

## 2017-05-31 ASSESSMENT — PATIENT HEALTH QUESTIONNAIRE - PHQ9: SUM OF ALL RESPONSES TO PHQ QUESTIONS 1-9: 6

## 2017-05-31 ASSESSMENT — ANXIETY QUESTIONNAIRES: GAD7 TOTAL SCORE: 6

## 2017-06-01 ENCOUNTER — TELEPHONE (OUTPATIENT)
Dept: FAMILY MEDICINE | Facility: CLINIC | Age: 52
End: 2017-06-01

## 2017-06-06 ENCOUNTER — HOSPITAL ENCOUNTER (OUTPATIENT)
Dept: PHYSICAL THERAPY | Facility: CLINIC | Age: 52
Setting detail: THERAPIES SERIES
End: 2017-06-06
Attending: PHYSICIAN ASSISTANT
Payer: COMMERCIAL

## 2017-06-06 PROCEDURE — 97140 MANUAL THERAPY 1/> REGIONS: CPT | Mod: GP | Performed by: PHYSICAL THERAPIST

## 2017-06-06 PROCEDURE — 97110 THERAPEUTIC EXERCISES: CPT | Mod: GP | Performed by: PHYSICAL THERAPIST

## 2017-06-06 PROCEDURE — 40000718 ZZHC STATISTIC PT DEPARTMENT ORTHO VISIT: Performed by: PHYSICAL THERAPIST

## 2017-06-07 ENCOUNTER — ANTICOAGULATION THERAPY VISIT (OUTPATIENT)
Dept: ANTICOAGULATION | Facility: CLINIC | Age: 52
End: 2017-06-07
Payer: COMMERCIAL

## 2017-06-07 ENCOUNTER — OFFICE VISIT (OUTPATIENT)
Dept: PSYCHOLOGY | Facility: CLINIC | Age: 52
End: 2017-06-07
Payer: COMMERCIAL

## 2017-06-07 DIAGNOSIS — D75.1 POLYCYTHEMIA, SECONDARY: ICD-10-CM

## 2017-06-07 DIAGNOSIS — F31.62 MODERATE MIXED BIPOLAR I DISORDER (H): Primary | ICD-10-CM

## 2017-06-07 DIAGNOSIS — I73.9 PVD (PERIPHERAL VASCULAR DISEASE) WITH CLAUDICATION (H): ICD-10-CM

## 2017-06-07 DIAGNOSIS — I73.9 PERIPHERAL VASCULAR DISEASE WITH CLAUDICATION (H): ICD-10-CM

## 2017-06-07 DIAGNOSIS — Z79.01 LONG TERM CURRENT USE OF ANTICOAGULANT THERAPY: ICD-10-CM

## 2017-06-07 DIAGNOSIS — I82.409 DVT (DEEP VENOUS THROMBOSIS) (H): ICD-10-CM

## 2017-06-07 LAB
BASOPHILS # BLD AUTO: 0 10E9/L (ref 0–0.2)
BASOPHILS NFR BLD AUTO: 0.4 %
DIFFERENTIAL METHOD BLD: ABNORMAL
EOSINOPHIL # BLD AUTO: 0.1 10E9/L (ref 0–0.7)
EOSINOPHIL NFR BLD AUTO: 1.1 %
ERYTHROCYTE [DISTWIDTH] IN BLOOD BY AUTOMATED COUNT: 21.2 % (ref 10–15)
HCT VFR BLD AUTO: 60 % (ref 35–47)
HGB BLD-MCNC: 19.3 G/DL (ref 11.7–15.7)
IMM GRANULOCYTES # BLD: 0 10E9/L (ref 0–0.4)
IMM GRANULOCYTES NFR BLD: 0.2 %
INR PPP: 2.33 (ref 0.86–1.14)
LYMPHOCYTES # BLD AUTO: 1.4 10E9/L (ref 0.8–5.3)
LYMPHOCYTES NFR BLD AUTO: 24.3 %
MCH RBC QN AUTO: 28.3 PG (ref 26.5–33)
MCHC RBC AUTO-ENTMCNC: 32.2 G/DL (ref 31.5–36.5)
MCV RBC AUTO: 88 FL (ref 78–100)
MONOCYTES # BLD AUTO: 0.5 10E9/L (ref 0–1.3)
MONOCYTES NFR BLD AUTO: 8.3 %
NEUTROPHILS # BLD AUTO: 3.7 10E9/L (ref 1.6–8.3)
NEUTROPHILS NFR BLD AUTO: 65.7 %
PLATELET # BLD AUTO: 110 10E9/L (ref 150–450)
RBC # BLD AUTO: 6.83 10E12/L (ref 3.8–5.2)
WBC # BLD AUTO: 5.6 10E9/L (ref 4–11)

## 2017-06-07 PROCEDURE — 99207 ZZC NO CHARGE NURSE ONLY: CPT

## 2017-06-07 PROCEDURE — 90834 PSYTX W PT 45 MINUTES: CPT | Performed by: PSYCHOLOGIST

## 2017-06-07 PROCEDURE — 36415 COLL VENOUS BLD VENIPUNCTURE: CPT | Performed by: FAMILY MEDICINE

## 2017-06-07 PROCEDURE — 85025 COMPLETE CBC W/AUTO DIFF WBC: CPT | Performed by: FAMILY MEDICINE

## 2017-06-07 PROCEDURE — 85610 PROTHROMBIN TIME: CPT | Performed by: FAMILY MEDICINE

## 2017-06-07 NOTE — MR AVS SNAPSHOT
MRN:[id number]                      After Visit Summary   6/7/2017    Bia Bill    MRN: [id number]           Visit Information        Provider Department      6/7/2017 8:30 AM Kd Morris [facility]      Your next 10 appointments already scheduled     Jun 13, 2017  2:30 PM CDT   Ortho Treatment with Orquidea Orellana PT   [facility] ([facility])    [address] [place] [address]   [phone]            Jun 21, 2017  8:30 AM CDT   Return Visit with Kd Morris   [facility] ([facility])    [address] [facility] [address]   [phone]            Jul 05, 2017 10:00 AM CDT   [place] with [place] [facility]   [facility])    [address] [facility] [address]   [phone]           Patient must bring picture ID.  Patient should be prepared to give a urine specimen  Please do not eat 10-12 hours before your appointment if you are coming in fasting for labs on lipids, cholesterol, or glucose (sugar).  Pregnant women should follow their Care Team instructions. Water with medications is okay. Do not drink coffee or other fluids.   If you have concerns about taking  your medications, please ask at office or if scheduling via [employer], send a message by clicking on Secure Messaging, Message Your Care Team.            Jul 06, 2017  9:30 AM CDT   Return Visit with Kd Morris   [facility] ([facility])    [address] [facility] [address]   [phone]            Jul 10, 2017  1:15 PM CDT   Return Visit with Patel Rice MD   [facility] [place] ([facility] [place])    [address]  [place] [address]   [phone]            Aug 02, 2017 10:00 AM CDT   LAB with [place] [facility]   [facility] ([facility])  "   [address] [facility] [address]   [phone]           Patient must bring picture ID.  Patient should be prepared to give a urine specimen  Please do not eat 10-12 hours before your appointment if you are coming in fasting for labs on lipids, cholesterol, or glucose (sugar).  Pregnant women should follow their Care Team instructions. Water with medications is okay. Do not drink coffee or other fluids.   If you have concerns about taking  your medications, please ask at office or if scheduling via [employer], send a message by clicking on Secure Messaging, Message Your Care Team.            Sep 06, 2017 10:00 AM CDT   LAB with [facility] ([facility])    [address] [facility] [address]   [phone]           Patient must bring picture ID.  Patient should be prepared to give a urine specimen  Please do not eat 10-12 hours before your appointment if you are coming in fasting for labs on lipids, cholesterol, or glucose (sugar).  Pregnant women should follow their Care Team instructions. Water with medications is okay. Do not drink coffee or other fluids.   If you have concerns about taking  your medications, please ask at office or if scheduling via [employer], send a message by clicking on Secure Messaging, Message Your Care Team.            Sep 22, 2017  1:00 PM CDT   Return Visit with Joshua Watts MD   [facility] ([facility])    [facility]  [address]  [facility] [address]   [phone]              [employer] Information     [employer] lets you send messages to your doctor, view your test results, renew your prescriptions, schedule appointments and more. To sign up, go to [email or web address] . Click on \"Log in\" on the left side of the screen, which will take you to the Welcome page. Then click on \"Sign up Now\" on the right side of the page.     You will be asked to enter the access " code listed below, as well as some personal information. Please follow the directions to create your username and password.     Your access code is: [email or web address]  Expires: 7/3/2017  3:48 PM     Your access code will  in 90 days. If you need help or a new code, please call your [place] clinic or [phone].        Care EveryWhere ID     This is your Care EveryWhere ID. This could be used by other organizations to access your [place] medical records  [id number]

## 2017-06-07 NOTE — PROGRESS NOTES
"  ANTICOAGULATION FOLLOW-UP CLINIC VISIT    Patient Name:  Bia Bill  Date:  6/7/2017  Contact Type:  Telephone/ Norah Bill    SUBJECTIVE:     Patient Findings     Positives Change in medications (gabapentin increased at last PCP visit), Herbal/Supplements (been taking \"fat fighter\" supplement for about 1 week)    Comments Writer explained to the patient that there might be herbs in the supplement she is taking which could increase her bleed risk and she should be cautious about that. It does not seem to be affecting her INR too much since she has been taking this for 1 week and INR in range.            OBJECTIVE    INR   Date Value Ref Range Status   06/07/2017 2.33 (H) 0.86 - 1.14 Final     Comment:     Special tube used to correct for high hematocrit       ASSESSMENT / PLAN  INR assessment THER    Recheck INR In: 4 WEEKS    INR Location Clinic  lab     Anticoagulation Summary as of 6/7/2017     INR goal 2.0-3.0   Today's INR 2.33   Maintenance plan 10 mg (5 mg x 2) on Mon, Wed, Fri; 15 mg (5 mg x 3) all other days   Full instructions 10 mg on Mon, Wed, Fri; 15 mg all other days   Weekly total 90 mg   No change documented Mireille Torres, RN   Plan last modified Mireille Torres, RN (5/3/2017)   Next INR check 7/5/2017   Priority INR   Target end date Indefinite    Indications   Long term current use of anticoagulant therapy [Z79.01]  DVT (deep venous thrombosis) (H) [I82.409]  PVD (peripheral vascular disease) with claudication (H) [I73.9]         Anticoagulation Episode Summary     INR check location     Preferred lab     Send INR reminders to [place] PHONE ANTICOAG POOL    Comments * lab draw due to elevated hematocrit (fingerstick meters don't work). LEFT LE. STENT x 3 LEFT UPPER LEG. Previous arterial clot. 2/27/17 CURRENT PHONE # [phone]      Anticoagulation Care Providers     Provider Role Specialty Phone number    Kd Leong MD [facility] [phone]            See the " Encounter Report to view Anticoagulation Flowsheet and Dosing Calendar (Go to Encounters tab in chart review, and find the Anticoagulation Therapy Visit)        Mireille Torres RN

## 2017-06-07 NOTE — MR AVS SNAPSHOT
Bia Bill   6/7/2017   Anticoagulation Therapy Visit    Description:  51 year old female   Provider:  Mireille Torres, RN   Department:  [facility]           INR as of 6/7/2017     Today's INR 2.33      Anticoagulation Summary as of 6/7/2017     INR goal 2.0-3.0   Today's INR 2.33   Full instructions 10 mg on Mon, Wed, Fri; 15 mg all other days   Next INR check 7/5/2017    Indications   Long term current use of anticoagulant therapy [Z79.01]  DVT (deep venous thrombosis) (H) [I82.409]  PVD (peripheral vascular disease) with claudication (H) [I73.9]         June 2017 Details    Sun Mon Tue Wed Thu Fri Sat         1               2               3                 4               5               6               7      10 mg   See details      8      15 mg         9      10 mg         10      15 mg           11      15 mg         12      10 mg         13      15 mg         14      10 mg         15      15 mg         16      10 mg         17      15 mg           18      15 mg         19      10 mg         20      15 mg         21      10 mg         22      15 mg         23      10 mg         24      15 mg           25      15 mg         26      10 mg         27      15 mg         28      10 mg         29      15 mg         30      10 mg           Date Details   06/07 This INR check               How to take your warfarin dose     To take:  10 mg Take 2 of the 5 mg tablets.    To take:  15 mg Take 3 of the 5 mg tablets.           July 2017 Details    Sun Mon Tue Wed Thu Fri Sat           1      15 mg           2      15 mg         3      10 mg         4      15 mg         5            6               7               8                 9               10               11               12               13               14               15                 16               17               18               19               20               21               22                 23               24               25                26               27               28               29                 30               31                     Date Details   No additional details    Date of next INR:  7/5/2017         How to take your warfarin dose     To take:  10 mg Take 2 of the 5 mg tablets.    To take:  15 mg Take 3 of the 5 mg tablets.
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
## 2017-06-13 ENCOUNTER — HOSPITAL ENCOUNTER (OUTPATIENT)
Dept: PHYSICAL THERAPY | Facility: CLINIC | Age: 52
Setting detail: THERAPIES SERIES
End: 2017-06-13
Attending: PHYSICIAN ASSISTANT
Payer: COMMERCIAL

## 2017-06-13 PROCEDURE — 97110 THERAPEUTIC EXERCISES: CPT | Mod: GP | Performed by: PHYSICAL THERAPIST

## 2017-06-13 PROCEDURE — 40000718 ZZHC STATISTIC PT DEPARTMENT ORTHO VISIT: Performed by: PHYSICAL THERAPIST

## 2017-06-13 PROCEDURE — 97112 NEUROMUSCULAR REEDUCATION: CPT | Mod: GP | Performed by: PHYSICAL THERAPIST

## 2017-06-20 ENCOUNTER — HOSPITAL ENCOUNTER (OUTPATIENT)
Dept: PHYSICAL THERAPY | Facility: CLINIC | Age: 52
Setting detail: THERAPIES SERIES
End: 2017-06-20
Attending: PHYSICIAN ASSISTANT
Payer: COMMERCIAL

## 2017-06-20 PROCEDURE — 97110 THERAPEUTIC EXERCISES: CPT | Mod: GP | Performed by: PHYSICAL THERAPIST

## 2017-06-20 PROCEDURE — 97140 MANUAL THERAPY 1/> REGIONS: CPT | Mod: GP | Performed by: PHYSICAL THERAPIST

## 2017-06-20 PROCEDURE — 40000718 ZZHC STATISTIC PT DEPARTMENT ORTHO VISIT: Performed by: PHYSICAL THERAPIST

## 2017-06-21 ENCOUNTER — OFFICE VISIT (OUTPATIENT)
Dept: PSYCHOLOGY | Facility: CLINIC | Age: 52
End: 2017-06-21
Payer: COMMERCIAL

## 2017-06-21 DIAGNOSIS — F31.62 MODERATE MIXED BIPOLAR I DISORDER (H): Primary | ICD-10-CM

## 2017-06-21 PROCEDURE — 90834 PSYTX W PT 45 MINUTES: CPT | Performed by: PSYCHOLOGIST
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
## 2017-06-21 NOTE — MR AVS SNAPSHOT
MRN:[id number]                      After Visit Summary   6/21/2017    Bia Bill    MRN: [id number]           Visit Information        Provider Department      6/21/2017 8:30 AM Kd Morris [facility] Generic      Your next 10 appointments already scheduled     Jul 05, 2017 10:00 AM CDT   LAB with [facility] ([facility])    [address] [address]   [phone]           Patient must bring picture ID.  Patient should be prepared to give a urine specimen  Please do not eat 10-12 hours before your appointment if you are coming in fasting for labs on lipids, cholesterol, or glucose (sugar).  Pregnant women should follow their Care Team instructions. Water with medications is okay. Do not drink coffee or other fluids.   If you have concerns about taking  your medications, please ask at office or if scheduling via [employer], send a message by clicking on Secure Messaging, Message Your Care Team.            Jul 06, 2017  9:30 AM CDT   Return Visit with Kd Morris   [facility] ([facility])    [address] [address]   [phone]            Jul 10, 2017  1:15 PM CDT   Return Visit with Patel Rice MD   [facility] [place] ([facility] [place])    [address]  [place] [address]   [phone]            Jul 20, 2017  8:30 AM CDT   Return Visit with Kd Morris   [facility] ([facility])    [address] [address]   [phone]            Aug 02, 2017 10:00 AM CDT   LAB with [facility] ([facility])    [address] [address]   [phone]           Patient must bring picture ID.  Patient should be prepared to give a urine specimen  Please do not eat 10-12 hours before your  "appointment if you are coming in fasting for labs on lipids, cholesterol, or glucose (sugar).  Pregnant women should follow their Care Team instructions. Water with medications is okay. Do not drink coffee or other fluids.   If you have concerns about taking  your medications, please ask at office or if scheduling via [employer], send a message by clicking on Secure Messaging, Message Your Care Team.            Sep 06, 2017 10:00 AM CDT   LAB with [facility] ([facility])    [address] [address]   [phone]           Patient must bring picture ID.  Patient should be prepared to give a urine specimen  Please do not eat 10-12 hours before your appointment if you are coming in fasting for labs on lipids, cholesterol, or glucose (sugar).  Pregnant women should follow their Care Team instructions. Water with medications is okay. Do not drink coffee or other fluids.   If you have concerns about taking  your medications, please ask at office or if scheduling via [employer], send a message by clicking on Secure Messaging, Message Your Care Team.            Sep 22, 2017  1:00 PM CDT   Return Visit with Joshua Watts MD   [facility] ([facility])    [facility]  [address]  [place] [facility] [place] [place] [address]   [phone]              [employer]ev[employer] Information     S[employer]con lets you send messages to your doctor, view your test results, renew your prescriptions, schedule appointments and more. To sign up, go to [email or web address] . Click on \"Log in\" on the left side of the screen, which will take you to the Welcome page. Then click on \"Sign up Now\" on the right side of the page.     You will be asked to enter the access code listed below, as well as some personal information. Please follow the directions to create your username and password.     Your access code is: [email or web address]  Expires: 7/3/2017  3:48 PM     Your " access code will  in 90 days. If you need help or a new code, please call your [place] clinic or [phone].        Care EveryWhere ID     This is your Care EveryWhere ID. This could be used by other organizations to access your [place] medical records  [id number]        Equal Access to Services     ANTIONETTE IRBY : Elfego giraldo Sojossy, waaxda luqadaha, qafroilan kaalmafrancis lcay, nitin craig. So [facility] [phone].    ATENCIÓN: Si habla español, tiene a terry disposición servicios gratuitos de asistencia lingüística. Llame al [phone].    We comply with applicable federal civil rights laws and [place] laws. We do not discriminate on the basis of race, color, national origin, age, disability sex, sexual orientation or gender identity.

## 2017-07-05 ENCOUNTER — ANTICOAGULATION THERAPY VISIT (OUTPATIENT)
Dept: ANTICOAGULATION | Facility: CLINIC | Age: 52
End: 2017-07-05
Payer: COMMERCIAL

## 2017-07-05 DIAGNOSIS — I73.9 PVD (PERIPHERAL VASCULAR DISEASE) WITH CLAUDICATION (H): ICD-10-CM

## 2017-07-05 DIAGNOSIS — Z79.01 LONG TERM CURRENT USE OF ANTICOAGULANT THERAPY: ICD-10-CM

## 2017-07-05 DIAGNOSIS — D75.1 POLYCYTHEMIA, SECONDARY: ICD-10-CM

## 2017-07-05 DIAGNOSIS — Z86.718 HISTORY OF DEEP VENOUS THROMBOSIS: ICD-10-CM

## 2017-07-05 DIAGNOSIS — I73.9 PVD (PERIPHERAL VASCULAR DISEASE) (H): ICD-10-CM

## 2017-07-05 LAB
BASOPHILS # BLD AUTO: 0 10E9/L (ref 0–0.2)
BASOPHILS NFR BLD AUTO: 0.2 %
DIFFERENTIAL METHOD BLD: ABNORMAL
EOSINOPHIL # BLD AUTO: 0.1 10E9/L (ref 0–0.7)
EOSINOPHIL NFR BLD AUTO: 1.3 %
ERYTHROCYTE [DISTWIDTH] IN BLOOD BY AUTOMATED COUNT: 21.1 % (ref 10–15)
HCT VFR BLD AUTO: 58.3 % (ref 35–47)
HGB BLD-MCNC: 18.9 G/DL (ref 11.7–15.7)
IMM GRANULOCYTES # BLD: 0 10E9/L (ref 0–0.4)
IMM GRANULOCYTES NFR BLD: 0.2 %
INR PPP: 2.2 (ref 0.86–1.14)
LYMPHOCYTES # BLD AUTO: 1.4 10E9/L (ref 0.8–5.3)
LYMPHOCYTES NFR BLD AUTO: 29.9 %
MCH RBC QN AUTO: 28.5 PG (ref 26.5–33)
MCHC RBC AUTO-ENTMCNC: 32.4 G/DL (ref 31.5–36.5)
MCV RBC AUTO: 88 FL (ref 78–100)
MONOCYTES # BLD AUTO: 0.4 10E9/L (ref 0–1.3)
MONOCYTES NFR BLD AUTO: 9 %
NEUTROPHILS # BLD AUTO: 2.8 10E9/L (ref 1.6–8.3)
NEUTROPHILS NFR BLD AUTO: 59.4 %
PLATELET # BLD AUTO: 96 10E9/L (ref 150–450)
RBC # BLD AUTO: 6.64 10E12/L (ref 3.8–5.2)
WBC # BLD AUTO: 4.7 10E9/L (ref 4–11)

## 2017-07-05 PROCEDURE — 99207 ZZC NO CHARGE NURSE ONLY: CPT | Performed by: REGISTERED NURSE

## 2017-07-05 PROCEDURE — 36415 COLL VENOUS BLD VENIPUNCTURE: CPT | Performed by: INTERNAL MEDICINE

## 2017-07-05 PROCEDURE — 85610 PROTHROMBIN TIME: CPT | Performed by: FAMILY MEDICINE

## 2017-07-05 PROCEDURE — 85025 COMPLETE CBC W/AUTO DIFF WBC: CPT | Performed by: INTERNAL MEDICINE

## 2017-07-05 NOTE — PROGRESS NOTES
ANTICOAGULATION FOLLOW-UP CLINIC VISIT    Patient Name:  Bia Bill  Date:  7/5/2017  Contact Type:  Telephone/ left VM for pt    SUBJECTIVE:        OBJECTIVE    INR   Date Value Ref Range Status   07/05/2017 2.20 (H) 0.86 - 1.14 Final       ASSESSMENT / PLAN  INR assessment THER    Recheck INR In: 4 WEEKS    INR Location Clinic lab draw     Anticoagulation Summary as of 7/5/2017     INR goal 2.0-3.0   Today's INR 2.20   Maintenance plan 10 mg (5 mg x 2) on Mon, Wed, Fri; 15 mg (5 mg x 3) all other days   Full instructions 10 mg on Mon, Wed, Fri; 15 mg all other days   Weekly total 90 mg   No change documented Brenna Rausch RN   Plan last modified Mireille Torres RN (5/3/2017)   Next INR check 8/2/2017   Priority INR   Target end date Indefinite    Indications   Long term current use of anticoagulant therapy [Z79.01]  DVT (deep venous thrombosis) (H) [I82.409]  PVD (peripheral vascular disease) with claudication (H) [I73.9]         Anticoagulation Episode Summary     INR check location     Preferred lab     Send INR reminders to [facility]    Comments * lab draw due to elevated hematocrit (fingerstick meters don't work). LEFT LE. STENT x 3 LEFT UPPER LEG. Previous arterial clot. 2/27/17 CURRENT PHONE # [phone]      Anticoagulation Care Providers     Provider Role Specialty Phone number    Kd Leong MD [facility] [phone]            See the Encounter Report to view Anticoagulation Flowsheet and Dosing Calendar (Go to Encounters tab in chart review, and find the Anticoagulation Therapy Visit)    Brenna Rausch, RN

## 2017-07-05 NOTE — MR AVS SNAPSHOT
Bia Bill   7/5/2017   Anticoagulation Therapy Visit    Description:  51 year old female   Provider:  Brenna Rausch, RN   Department:  [facility]           INR as of 7/5/2017     Today's INR 2.20      Anticoagulation Summary as of 7/5/2017     INR goal 2.0-3.0   Today's INR 2.20   Full instructions 10 mg on Mon, Wed, Fri; 15 mg all other days   Next INR check 8/2/2017    Indications   Long term current use of anticoagulant therapy [Z79.01]  DVT (deep venous thrombosis) (H) [I82.409]  PVD (peripheral vascular disease) with claudication (H) [I73.9]         Description     No change, recheck INR in 4 weeks.      July 2017 Details    Sun Mon Tue Wed Thu Fri Sat           1                 2               3               4               5      10 mg   See details      6      15 mg         7      10 mg         8      15 mg           9      15 mg         10      10 mg         11      15 mg         12      10 mg         13      15 mg         14      10 mg         15      15 mg           16      15 mg         17      10 mg         18      15 mg         19      10 mg         20      15 mg         21      10 mg         22      15 mg           23      15 mg         24      10 mg         25      15 mg         26      10 mg         27      15 mg         28      10 mg         29      15 mg           30      15 mg         31      10 mg               Date Details   07/05 This INR check               How to take your warfarin dose     To take:  10 mg Take 2 of the 5 mg tablets.    To take:  15 mg Take 3 of the 5 mg tablets.           August 2017 Details    Sun Mon Tue Wed Thu Fri Sat       1      15 mg         2            3               4               5                 6               7               8               9               10               11               12                 13               14               15               16               17               18               19                 20                21               22               23               24               25               26                 27               28               29               30               31                  Date Details   No additional details    Date of next INR:  8/2/2017         How to take your warfarin dose     To take:  10 mg Take 2 of the 5 mg tablets.    To take:  15 mg Take 3 of the 5 mg tablets.

## 2017-07-10 ENCOUNTER — OFFICE VISIT (OUTPATIENT)
Dept: ORTHOPEDICS | Facility: CLINIC | Age: 52
End: 2017-07-10
Payer: COMMERCIAL

## 2017-07-10 VITALS
OXYGEN SATURATION: 96 % | BODY MASS INDEX: 46.31 KG/M2 | DIASTOLIC BLOOD PRESSURE: 76 MMHG | HEART RATE: 71 BPM | WEIGHT: 229.7 LBS | SYSTOLIC BLOOD PRESSURE: 130 MMHG | HEIGHT: 59 IN | RESPIRATION RATE: 20 BRPM

## 2017-07-10 DIAGNOSIS — M25.511 CHRONIC RIGHT SHOULDER PAIN: Primary | ICD-10-CM

## 2017-07-10 DIAGNOSIS — G89.29 CHRONIC RIGHT SHOULDER PAIN: Primary | ICD-10-CM

## 2017-07-10 DIAGNOSIS — Z98.890 S/P ARTHROSCOPY OF SHOULDER: ICD-10-CM

## 2017-07-10 PROCEDURE — 99212 OFFICE O/P EST SF 10 MIN: CPT | Performed by: ORTHOPAEDIC SURGERY

## 2017-07-10 ASSESSMENT — PAIN SCALES - GENERAL: PAINLEVEL: SEVERE PAIN (7)

## 2017-07-10 NOTE — PROGRESS NOTES
chief complaint:   Chief Complaint   Patient presents with     Surgical Followup      Right shoulder cuff debridement, biceps tenodesis. DOS: 2/7/17. 5 months PO. Patient reports a fall about 2 weeks ago, she landed on her right elbow but felt pain in her right shoulder. She has bene having numbness, tingling and aching pain in her right shoulder since fall. PT completed 2 weeks ago.        SURGERY: Right shoulder arthroscopy ( [facility] ).  1. Right shoulder arthroscopic proximal biceps tenodesis   2. Right shoulder arthroscopic distal clavical resection   3. Right shoulder arthroscopic labral debridement   4. Right shoulder arthroscopic partial thickness rotator cuff debridement  5. Right shoulder arthroscopic subacromial decompression with partial acromioplasty    DATE OF SURGERY: 2/7/2017.    HISTORY OF PRESENT ILLNESS:  Bia Bill is a 51 year old female seen for postoperative evaluation of a right shoulder arthroscopy with proximal biceps tenodesis, distal clavical resection, labral debridement, rotator cuff debridement, and subacromial decompression for chronic right shoulder impingement syndrome, acromialclavicular arthritis and a low-grade partial thickness tearing of the anterior supraspinatus with type 2 SLAP tear. Surgery occurred 5 months ago. About 2 weeks ago, patient fell on the stairs, landing on the elbow and started having pain in the shoulder again. Prior to fall, patient only had minimal pain. Today, her pain is severe, rated a 7/10. She has been having some numbness and tingling and aching pain in her right shoulder since the fall.       Recall she was 10/10 pain preop, and understands there may be more to her pain than findings within the shoulder and that surgery may not alleviate her pain.    OR Findings: Proximal biceps tendinosis with longitudinal striational tearing. Type 2 SLAP tear. Partial thickness tear at the deep fibers of the subscapularis without full-thickness  tear. Glenohumeral synovitis. Intact articular-sided rotator cuff. Low grade partial thickness bursal sided rotator cuff tear, less than 30% far anterolateral supraspinatus. Prominent subacromial and subclavicular bone spurs with a tight arthritic acromioclavicular joint. Thickened subacromial bursa.    Past medical history:   Past Medical History:   Diagnosis Date     Anxiety state, unspecified      Bipolar I disorder, most recent episode (or current) unspecified      Depressive disorder, not elsewhere classified      DVT, lower extremity (H)      Polycythemia, secondary     [place] [facility]        Past surgical history:   Past Surgical History:   Procedure Laterality Date     BILIARY STENT SINGLE USE COV      3 stints in left leg     BONE MARROW BIOPSY, BONE SPECIMEN, NEEDLE/TROCAR N/A 11/17/2014    Procedure: BIOPSY BONE MARROW;  Surgeon: Hay Aaron MD;  Location: [facility]     D & C  10/26/09    with uterine ablation     ESOPHAGOSCOPY, GASTROSCOPY, DUODENOSCOPY (EGD), COMBINED  4/21/2014    Procedure: Gastroscopy;  Surgeon: Moris Thomas MD;  Location: [facility]     HYSTERECTOMY, PAP NO LONGER INDICATED  1-4-2010     LITHOTRIPSY  2004    Lithotrypsy     Medications:   Current Outpatient Prescriptions:      lamoTRIgine (LAMICTAL) 200 MG tablet, Take 1 tablet (200 mg) by mouth daily, Disp: 90 tablet, Rfl: 3     ARIPiprazole (ABILIFY) 5 MG tablet, Take 1.5 tablets (7.5 mg) by mouth At Bedtime, Disp: 180 tablet, Rfl: 3     gabapentin (NEURONTIN) 600 MG tablet, Take 1 tablet (600 mg) by mouth 3 times daily, Disp: 90 tablet, Rfl: 3     mometasone-formoterol (DULERA) 100-5 MCG/ACT oral inhaler, Inhale 2 puffs into the lungs 2 times daily, Disp: 13 g, Rfl: 11     gabapentin (NEURONTIN) 300 MG capsule, Take 1 capsule (300 mg) by mouth At Bedtime Take with 600mg at bedtime. For total dose of 600mg in am 600 at noon and 900mg at bedtime, Disp: 90 capsule, Rfl: 3     EPINEPHrine 0.3 MG/0.3ML injection, Inject  "0.3 mLs (0.3 mg) into the muscle once as needed for anaphylaxis, Disp: 0.6 mL, Rfl: 0     warfarin (COUMADIN) 5 MG tablet, as directed by Anticoagulation Clinic, current dose 10 mg MWF, 15 mg rest of week, Disp: 220 tablet, Rfl: 0     simvastatin (ZOCOR) 20 MG tablet, TAKE ONE TABLET BY MOUTH AT BEDTIME, Disp: 90 tablet, Rfl: 3     omeprazole (PRILOSEC) 20 MG CR capsule, TAKE 1 CAPSULE (20 MG) BY MOUTH 2 TIMES DAILY, Disp: 180 capsule, Rfl: 1     levothyroxine (SYNTHROID/LEVOTHROID) 150 MCG tablet, Take 1 tablet (150 mcg) by mouth daily, Disp: 90 tablet, Rfl: 1     methocarbamol (ROBAXIN) 500 MG tablet, Take 500 mg by mouth, Disp: , Rfl:      ketorolac (TORADOL) 10 MG tablet, Take 1 tablet (10 mg) by mouth every 6 hours as needed, Disp: 20 tablet, Rfl: 0     order for DME, Equipment being ordered: CPAP AIRSENSE 10 5-18 CM H20 # [id number]   DN# [id number], Disp: , Rfl:      traZODone (DESYREL) 150 MG tablet, Take 1 tablet (150 mg) by mouth At Bedtime, Disp: 90 tablet, Rfl: 1     allopurinol (ZYLOPRIM) 100 MG tablet, Take 1 tablet (100 mg) by mouth daily, Disp: 90 tablet, Rfl: 2     VITAMIN D3 1000 UNITS tablet, Take 1 tablet by mouth daily, Disp: , Rfl:      furosemide (LASIX) 20 MG tablet, Take 1 tablet (20 mg) by mouth 2 times daily, Disp: 180 tablet, Rfl: 2     buPROPion (WELLBUTRIN XL) 150 MG 24 hr tablet, Take 1 tablet (150 mg) by mouth every morning, Disp: 90 tablet, Rfl: 3     order for DME, Equipment being ordered: [id number]\" x2pair   \"20-30mmHg [facility] Hybrid Liners\" x 2 pair, Disp: 2 Units, Rfl: 11     ipratropium - albuterol 0.5 mg/2.5 mg/3 mL (DUONEB) 0.5-2.5 (3) MG/3ML nebulization, Take 1 vial (3 mLs) by nebulization every 6 hours as needed for shortness of breath / dyspnea or wheezing USE THIS INSTEAD OF ALBUTEROL INHALER AS NEEDED FOR WHEEZE COUGH OR SHORTNESS OF BREATH, Disp: 30 vial, Rfl: 1     albuterol (PROAIR HFA, PROVENTIL HFA, VENTOLIN HFA) 108 (90 BASE) MCG/ACT inhaler, Inhale 2 puffs into " "the lungs every 6 hours as needed for shortness of breath / dyspnea or wheezing, Disp: 3 Inhaler, Rfl: 1     order for DME, Equipment being ordered: DEPENDS SIZE LARGE, Disp: 60 Month, Rfl: 5     order for DME, Equipment being ordered:   \"[id number]\" x2pair  \"20-30mmHg [place] Hybrid Liners\" x1pair, Disp: 2 Device, Rfl: 11     order for DME, Equipment being ordered: INCONTINENCE PADS  QID, Disp: 1 Month, Rfl: 11     order for DME, Equipment being ordered: CPAP supplies needed, Disp: 3 Month, Rfl: 3     albuterol (2.5 MG/3ML) 0.083% nebulizer solution, Take 1 vial (2.5 mg) by nebulization every 6 hours as needed for shortness of breath / dyspnea or wheezing, Disp: 1 Box, Rfl: 0     acetaminophen (TYLENOL) 500 MG tablet, Take 500-1,000 mg by mouth every 6 hours as needed for mild pain, Disp: , Rfl:      ORDER FOR DME, Equipment being ordered: CPAP, Disp: 1 Units, Rfl: 11     ASPIRIN NOT PRESCRIBED, INTENTIONAL,, by Other route continuous prn Reported on 3/24/2017, Disp: , Rfl: 0    Allergies:   Allergies   Allergen Reactions     Darvocet [Propoxyphene N-Apap] Anaphylaxis     Darvocet,Percocet      Percocet [Codeine] Anaphylaxis     Percocet [Oxycodone-Acetaminophen] Swelling, Anaphylaxis and Hives     Asa [Aspirin] Hives     Aspirin causes seizures and hives, throat swelling       Aspirin Hives     Bee      Lyrica [Pregabalin] Swelling and Unknown     Swelling.  dizziness     Iodine Rash     Reaction to topical betadine     Povidone Iodine Rash     Reaction to topical betadine     Tape [Adhesive Tape] Rash       REVIEW OF SYSTEMS:   CONSTITUTIONAL:NEGATIVE for fever, chills, night sweats  INTEGUMENTARY/SKIN: NEGATIVE for worrisome wound problems or redness.  MUSCULOSKELETAL:See HPI above  NEURO: NEGATIVE for weakness, dizziness or paresthesias    This document serves as a record of the services and decisions personally performed and made by Patel Rice MD. It was created on his behalf by Licha Nicole, divya trained " "medical scribe. The creation of this document is based the provider's statements to the medical scribe.    Selvin Licha Nicole 1:25 PM 7/10/2017     PHYSICAL EXAM:  /76 (BP Location: Left arm, Patient Position: Chair, Cuff Size: Adult Large)  Pulse 71  Resp 20  Ht 1.499 m (4' 11\")  Wt 104.2 kg (229 lb 11.2 oz)  LMP 09/27/2009  SpO2 96%  BMI 46.39 kg/m2   GENERAL APPEARANCE: healthy, alert, no distress  SKIN: diffuse right upper extremity scab lesions, notably about the shoulder area  NEURO: Normal strength and tone, mentation intact and speech normal  PSYCH:  mentation appears normal and affect normal, not anxious  RESPIRATORY: No increased work of breathing.    right UPPER EXTREMITY:  Sensation intact to light touch in median, radial, ulnar and axillary nerve distributions  Palpable 2+ radial pulse, brisk capillary refill to all fingers, wwp  Intact epl fpl fdp edc wrist flexion/extension biceps triceps deltoid    right SHOULDER:  Shoulder Inspection: no swelling, no ecchymosis, no erythema, no maceration, no deformity; diffuse \"scab\" like lesions not related to surgical sites.  Tender: diffuse tenderness in arm and shoulder  Range of Motion: forward flexion: 120 degrees active / 150 passive   External rotation: 40 active, 60 degrees passive  Strength: forward flexion: 5-/5, external rotation: 5-/5.    X-RAY: none reviewed.      Impression: Bia Bill is a 51 year old female 5 months status post right shoulder arthroscopy and proximal biceps tenodesis, distal clavical resection, labral debridement, rotator cuff debridement, and subacromial decompression.     Plan: routine postoperative shoulder arthroscopy protocol  * based on exam, her shoulder seems to continuously be improving, despite reinjury.  * likely a strain.  * WB status: weight bearing per comfort.  * Activity: avoid aggravating activities.  * Rest.  * Ice twice daily to three times daily.  * continue at home exercises.  * Tylenol as " needed for pain  * return to clinic as needed.    The information in this document, created by a scribe for me, accurately reflects the services I personally performed and the decisions made by me. I have reviewed and approved this document for accuracy.      Patel Rice M.D., M.S.  Dept. of Orthopaedic Surgery  [facility]

## 2017-07-10 NOTE — MR AVS SNAPSHOT
After Visit Summary   7/10/2017    Bia Bill    MRN: [id number]           Patient Information     Date Of Birth          1965        Visit Information        Provider Department      7/10/2017 1:15 PM Patel Rice MD [facility]        Care Instructions    Please remember to call and schedule a follow up appointment if one was recommended at your earliest convenience.  Orthopedics CLINIC HOURS TELEPHONE NUMBER   Dr. Dwayne Desai  Certified Medical Assistant   Monday & Wednesday   8am - 5pm  Thursday 1pm - 5pm  Friday 8am -11:30am Specialty schedulers:   [phone] to schedule your surgery.  Main Clinic:   [phone] to make an appointment with any provider.    Urgent Care locations:    [facility] Monday-Friday Closed  Saturday-Sunday 9am-5pm      Monday-Friday 12pm - 8pm  Saturday-Sunday 9am-5pm [phone]     If SURGERY has been recommended, please call our Specialty Schedulers at [phone] to schedule your procedure.    If you need a medication refill, please contact your pharmacy. Please allow 3 business days for your refill to be completed.    If an MRI or CT scan has been recommended, please call [facility] Schedulers at [phone] to schedule your appointment.  Use [employer] Gamest (secure e-mail communication and access to your chart) to send a message or to make an appointment. Please ask how you can sign up for MaxxAthlete.  Your care team's suggested websites for health information:   [email or web address] : Up to date and easily searchable information on multiple topics.   [email or web address] : [place] dept of heat, public health issues in [place], N1N1              Follow-ups after your visit        Your next 10 appointments already scheduled     Jul 20, 2017  8:30 AM CDT   Return Visit with Kd Morris   [facility] ([facility])    [address]  [facility] [address]   [phone]            Aug 02, 2017 10:00 AM CDT   LAB with [facility] ([facility])    [address] [facility] [address]   [phone]           Patient must bring picture ID.  Patient should be prepared to give a urine specimen  Please do not eat 10-12 hours before your appointment if you are coming in fasting for labs on lipids, cholesterol, or glucose (sugar).  Pregnant women should follow their Care Team instructions. Water with medications is okay. Do not drink coffee or other fluids.   If you have concerns about taking  your medications, please ask at office or if scheduling via TempoIQ, send a message by clicking on Secure Messaging, Message Your Care Team.            Sep 06, 2017 10:00 AM CDT   LAB with [facility]   [facility] ([facility])    [address] [facility] [address]   [phone]           Patient must bring picture ID.  Patient should be prepared to give a urine specimen  Please do not eat 10-12 hours before your appointment if you are coming in fasting for labs on lipids, cholesterol, or glucose (sugar).  Pregnant women should follow their Care Team instructions. Water with medications is okay. Do not drink coffee or other fluids.   If you have concerns about taking  your medications, please ask at office or if scheduling via TempoIQ, send a message by clicking on Secure Messaging, Message Your Care Team.            Sep 22, 2017  1:00 PM CDT   Return Visit with Joshua Watts MD   [facility] ([facility])    [facility]  [address]  [facility] [address]   [phone]              Who to contact     If you have questions or need follow up information about today's clinic visit or your schedule please contact [place] [facility] directly at [phone].  Normal or non-critical lab and  "imaging results will be communicated to you by MyChart, letter or phone within 4 business days after the clinic has received the results. If you do not hear from us within 7 days, please contact the clinic through Translimitt or phone. If you have a critical or abnormal lab result, we will notify you by phone as soon as possible.  Submit refill requests through Ushi or call your pharmacy and they will forward the refill request to us. Please allow 3 business days for your refill to be completed.          Additional Information About Your Visit        Ushi Information     Ushi lets you send messages to your doctor, view your test results, renew your prescriptions, schedule appointments and more. To sign up, go to [email or web address] . Click on \"Log in\" on the left side of the screen, which will take you to the Welcome page. Then click on \"Sign up Now\" on the right side of the page.     You will be asked to enter the access code listed below, as well as some personal information. Please follow the directions to create your username and password.     Your access code is: [email or web address]  Expires: 10/8/2017  1:43 PM     Your access code will  in 90 days. If you need help or a new code, please call your [place] clinic or [phone].        Care EveryWhere ID     This is your Care EveryWhere ID. This could be used by other organizations to access your [place] medical records  [id number]        Your Vitals Were     Pulse Respirations Height Last Period Pulse Oximetry BMI (Body Mass Index)    71 20 1.499 m (4' 11\") 2009 96% 46.39 kg/m2       Blood Pressure from Last 3 Encounters:   07/10/17 130/76   17 138/88   17 125/68    Weight from Last 3 Encounters:   07/10/17 104.2 kg (229 lb 11.2 oz)   17 104.4 kg (230 lb 3.2 oz)   17 105 kg (231 lb 8 oz)              Today, you had the following     No orders found for display       Primary Care Provider Office Phone # Fax #    Kd " MD Dang [phone]       [facility] [address] [facility] [address]        Equal Access to Services     ANTIONETTE IRBY : Elfego jose zamora kristal Clark, waldemar timothyporsche, emma kajaylenda obed, nitin lemusnorth rafa. So [facility] [phone].    ATENCIÓN: Si habla español, tiene a terry disposición servicios gratuitos de asistencia lingüística. Llame al [phone].    We comply with applicable federal civil rights laws and [place] laws. We do not discriminate on the basis of race, color, national origin, age, disability sex, sexual orientation or gender identity.            Thank you!     Thank you for choosing [facility]  for your care. Our goal is always to provide you with excellent care. Hearing back from our patients is one way we can continue to improve our services. Please take a few minutes to complete the written survey that you may receive in the mail after your visit with us. Thank you!             Your Updated Medication List - Protect others around you: Learn how to safely use, store and throw away your medicines at www.disposemymeds.org.          This list is accurate as of: 7/10/17  1:43 PM.  Always use your most recent med list.                   Brand Name Dispense Instructions for use Diagnosis    acetaminophen 500 MG tablet    TYLENOL     Take 500-1,000 mg by mouth every 6 hours as needed for mild pain        * albuterol (2.5 MG/3ML) 0.083% neb solution     1 Box    Take 1 vial (2.5 mg) by nebulization every 6 hours as needed for shortness of breath / dyspnea or wheezing    Cough       * albuterol 108 (90 BASE) MCG/ACT Inhaler    PROAIR HFA/PROVENTIL HFA/VENTOLIN HFA    3 Inhaler    Inhale 2 puffs into the lungs every 6 hours as needed for shortness of breath / dyspnea or wheezing    Acute bronchospasm       allopurinol 100 MG tablet    ZYLOPRIM    90 tablet    Take 1 tablet (100 mg) by mouth daily    Acute gouty  arthritis       ARIPiprazole 5 MG tablet    ABILIFY    180 tablet    Take 1.5 tablets (7.5 mg) by mouth At Bedtime    Major depressive disorder, recurrent episode, moderate (H)       * ASPIRIN NOT PRESCRIBED    INTENTIONAL     by Other route continuous prn Reported on 3/24/2017        buPROPion 150 MG 24 hr tablet    WELLBUTRIN XL    90 tablet    Take 1 tablet (150 mg) by mouth every morning    COPD exacerbation (H)       cholecalciferol 1000 UNIT tablet    vitamin D     Take 1 tablet by mouth daily        EPINEPHrine 0.3 MG/0.3ML injection     0.6 mL    Inject 0.3 mLs (0.3 mg) into the muscle once as needed for anaphylaxis    Anaphylactic reaction to bee sting, undetermined intent, subsequent encounter       furosemide 20 MG tablet    LASIX    180 tablet    Take 1 tablet (20 mg) by mouth 2 times daily    Lymphedema of both lower extremities       * gabapentin 600 MG tablet    NEURONTIN    90 tablet    Take 1 tablet (600 mg) by mouth 3 times daily    PVD (peripheral vascular disease) with claudication (H)       * gabapentin 300 MG capsule    NEURONTIN    90 capsule    Take 1 capsule (300 mg) by mouth At Bedtime Take with 600mg at bedtime. For total dose of 600mg in am 600 at noon and 900mg at bedtime    Major depressive disorder, recurrent episode, moderate (H), Restless leg syndrome       ipratropium - albuterol 0.5 mg/2.5 mg/3 mL 0.5-2.5 (3) MG/3ML neb solution    DUONEB    30 vial    Take 1 vial (3 mLs) by nebulization every 6 hours as needed for shortness of breath / dyspnea or wheezing USE THIS INSTEAD OF ALBUTEROL INHALER AS NEEDED FOR WHEEZE COUGH OR SHORTNESS OF BREATH    COPD exacerbation (H)       ketorolac 10 MG tablet    TORADOL    20 tablet    Take 1 tablet (10 mg) by mouth every 6 hours as needed    Acute pain of right shoulder       lamoTRIgine 200 MG tablet    LaMICtal    90 tablet    Take 1 tablet (200 mg) by mouth daily    Major depressive disorder, recurrent episode, moderate (H)        "levothyroxine 150 MCG tablet    SYNTHROID/LEVOTHROID    90 tablet    Take 1 tablet (150 mcg) by mouth daily    Hypothyroidism, unspecified type       methocarbamol 500 MG tablet    ROBAXIN     Take 500 mg by mouth        mometasone-formoterol 100-5 MCG/ACT oral inhaler    DULERA    13 g    Inhale 2 puffs into the lungs 2 times daily    Mild persistent asthma without complication       omeprazole 20 MG CR capsule    priLOSEC    180 capsule    TAKE 1 CAPSULE (20 MG) BY MOUTH 2 TIMES DAILY    Gastroesophageal reflux disease without esophagitis       * order for DME      Equipment being ordered: CPAP AIRSENSE 10 5-18 CM H20 # [id number]   DN# [id number]        * order for DME     1 Units    Equipment being ordered: CPAP    GILES (obstructive sleep apnea)       * order for DME     3 Month    Equipment being ordered: CPAP supplies needed    GILES (obstructive sleep apnea)       * order for DME     1 Month    Equipment being ordered: INCONTINENCE PADS  QID    Other urinary incontinence       * order for DME     60 Month    Equipment being ordered: DEPENDS SIZE LARGE    Mixed incontinence       * order for DME     2 Device    Equipment being ordered:   \"[id number]\" x2pair  \"20-30mmHg Farrow Hybrid Liners\" x1pair    Lymphedema of both lower extremities       * order for DME     2 Units    Equipment being ordered: [id number]\" x2pair   \"20-30mmHg Farrow Hybrid Liners\" x 2 pair    Peripheral edema       simvastatin 20 MG tablet    ZOCOR    90 tablet    TAKE ONE TABLET BY MOUTH AT BEDTIME    Hypercholesteremia       traZODone 150 MG tablet    DESYREL    90 tablet    Take 1 tablet (150 mg) by mouth At Bedtime    Major depressive disorder, recurrent episode, moderate (H)       warfarin 5 MG tablet    COUMADIN    220 tablet    as directed by Anticoagulation Clinic, current dose 10 mg MWF, 15 mg rest of week    DVT (deep venous thrombosis) (H), Long term current use of anticoagulant therapy       * Notice:  This list has 12 " medication(s) that are the same as other medications prescribed for you. Read the directions carefully, and ask your doctor or other care provider to review them with you.

## 2017-07-10 NOTE — LETTER
7/10/2017         RE: Bia Bill  [address]  [facility] [address]        Dear Colleague,    Thank you for referring your patient, Bia Bill, to the [facility]. Please see a copy of my visit note below.    chief complaint:   Chief Complaint   Patient presents with     Surgical Followup      Right shoulder cuff debridement, biceps tenodesis. DOS: 2/7/17. 5 months PO. Patient reports a fall about 2 weeks ago, she landed on her right elbow but felt pain in her right shoulder. She has bene having numbness, tingling and aching pain in her right shoulder since fall. PT completed 2 weeks ago.        SURGERY: Right shoulder arthroscopy ( [facility] ).  1. Right shoulder arthroscopic proximal biceps tenodesis   2. Right shoulder arthroscopic distal clavical resection   3. Right shoulder arthroscopic labral debridement   4. Right shoulder arthroscopic partial thickness rotator cuff debridement  5. Right shoulder arthroscopic subacromial decompression with partial acromioplasty    DATE OF SURGERY: 2/7/2017.    HISTORY OF PRESENT ILLNESS:  Bia Bill is a 51 year old female seen for postoperative evaluation of a right shoulder arthroscopy with proximal biceps tenodesis, distal clavical resection, labral debridement, rotator cuff debridement, and subacromial decompression for chronic right shoulder impingement syndrome, acromialclavicular arthritis and a low-grade partial thickness tearing of the anterior supraspinatus with type 2 SLAP tear. Surgery occurred 5 months ago. About 2 weeks ago, patient fell on the stairs, landing on the elbow and started having pain in the shoulder again. Prior to fall, patient only had minimal pain. Today, her pain is severe, rated a 7/10. She has been having some numbness and tingling and aching pain in her right shoulder since the fall.       Recall she was 10/10 pain preop, and understands there may be more to  her pain than findings within the shoulder and that surgery may not alleviate her pain.    OR Findings: Proximal biceps tendinosis with longitudinal striational tearing. Type 2 SLAP tear. Partial thickness tear at the deep fibers of the subscapularis without full-thickness tear. Glenohumeral synovitis. Intact articular-sided rotator cuff. Low grade partial thickness bursal sided rotator cuff tear, less than 30% far anterolateral supraspinatus. Prominent subacromial and subclavicular bone spurs with a tight arthritic acromioclavicular joint. Thickened subacromial bursa.    Past medical history:   Past Medical History:   Diagnosis Date     Anxiety state, unspecified      Bipolar I disorder, most recent episode (or current) unspecified      Depressive disorder, not elsewhere classified      DVT, lower extremity (H)      Polycythemia, secondary     [place] HGB        Past surgical history:   Past Surgical History:   Procedure Laterality Date     BILIARY STENT SINGLE USE COV      3 stints in left leg     BONE MARROW BIOPSY, BONE SPECIMEN, NEEDLE/TROCAR N/A 11/17/2014    Procedure: BIOPSY BONE MARROW;  Surgeon: Hay Aaron MD;  Location: [facility]     D & C  10/26/09    with uterine ablation     ESOPHAGOSCOPY, GASTROSCOPY, DUODENOSCOPY (EGD), COMBINED  4/21/2014    Procedure: Gastroscopy;  Surgeon: Moris Thomas MD;  Location: [facility]     HYSTERECTOMY, PAP NO LONGER INDICATED  1-4-2010     LITHOTRIPSY  2004    Lithotrypsy     Medications:   Current Outpatient Prescriptions:      lamoTRIgine (LAMICTAL) 200 MG tablet, Take 1 tablet (200 mg) by mouth daily, Disp: 90 tablet, Rfl: 3     ARIPiprazole (ABILIFY) 5 MG tablet, Take 1.5 tablets (7.5 mg) by mouth At Bedtime, Disp: 180 tablet, Rfl: 3     gabapentin (NEURONTIN) 600 MG tablet, Take 1 tablet (600 mg) by mouth 3 times daily, Disp: 90 tablet, Rfl: 3     mometasone-formoterol (DULERA) 100-5 MCG/ACT oral inhaler, Inhale 2 puffs into the lungs 2 times daily,  "Disp: 13 g, Rfl: 11     gabapentin (NEURONTIN) 300 MG capsule, Take 1 capsule (300 mg) by mouth At Bedtime Take with 600mg at bedtime. For total dose of 600mg in am 600 at noon and 900mg at bedtime, Disp: 90 capsule, Rfl: 3     EPINEPHrine 0.3 MG/0.3ML injection, Inject 0.3 mLs (0.3 mg) into the muscle once as needed for anaphylaxis, Disp: 0.6 mL, Rfl: 0     warfarin (COUMADIN) 5 MG tablet, as directed by Anticoagulation Clinic, current dose 10 mg MWF, 15 mg rest of week, Disp: 220 tablet, Rfl: 0     simvastatin (ZOCOR) 20 MG tablet, TAKE ONE TABLET BY MOUTH AT BEDTIME, Disp: 90 tablet, Rfl: 3     omeprazole (PRILOSEC) 20 MG CR capsule, TAKE 1 CAPSULE (20 MG) BY MOUTH 2 TIMES DAILY, Disp: 180 capsule, Rfl: 1     levothyroxine (SYNTHROID/LEVOTHROID) 150 MCG tablet, Take 1 tablet (150 mcg) by mouth daily, Disp: 90 tablet, Rfl: 1     methocarbamol (ROBAXIN) 500 MG tablet, Take 500 mg by mouth, Disp: , Rfl:      ketorolac (TORADOL) 10 MG tablet, Take 1 tablet (10 mg) by mouth every 6 hours as needed, Disp: 20 tablet, Rfl: 0     order for DME, Equipment being ordered: CPAP AIRSENSE 10 5-18 CM H20 # [id number]   DN# [id number], Disp: , Rfl:      traZODone (DESYREL) 150 MG tablet, Take 1 tablet (150 mg) by mouth At Bedtime, Disp: 90 tablet, Rfl: 1     allopurinol (ZYLOPRIM) 100 MG tablet, Take 1 tablet (100 mg) by mouth daily, Disp: 90 tablet, Rfl: 2     VITAMIN D3 1000 UNITS tablet, Take 1 tablet by mouth daily, Disp: , Rfl:      furosemide (LASIX) 20 MG tablet, Take 1 tablet (20 mg) by mouth 2 times daily, Disp: 180 tablet, Rfl: 2     buPROPion (WELLBUTRIN XL) 150 MG 24 hr tablet, Take 1 tablet (150 mg) by mouth every morning, Disp: 90 tablet, Rfl: 3     order for DME, Equipment being ordered: [id number]\" x2pair   \"20-30mmHg Farrow Hybrid Liners\" x 2 pair, Disp: 2 Units, Rfl: 11     ipratropium - albuterol 0.5 mg/2.5 mg/3 mL (DUONEB) 0.5-2.5 (3) MG/3ML nebulization, Take 1 vial (3 mLs) by nebulization every 6 hours as " "needed for shortness of breath / dyspnea or wheezing USE THIS INSTEAD OF ALBUTEROL INHALER AS NEEDED FOR WHEEZE COUGH OR SHORTNESS OF BREATH, Disp: 30 vial, Rfl: 1     albuterol (PROAIR HFA, PROVENTIL HFA, VENTOLIN HFA) 108 (90 BASE) MCG/ACT inhaler, Inhale 2 puffs into the lungs every 6 hours as needed for shortness of breath / dyspnea or wheezing, Disp: 3 Inhaler, Rfl: 1     order for DME, Equipment being ordered: DEPENDS SIZE LARGE, Disp: 60 Month, Rfl: 5     order for DME, Equipment being ordered:   \"[id number]\" x2pair  \"20-30mmHg Farrow Hybrid Liners\" x1pair, Disp: 2 Device, Rfl: 11     order for DME, Equipment being ordered: INCONTINENCE PADS  QID, Disp: 1 Month, Rfl: 11     order for DME, Equipment being ordered: CPAP supplies needed, Disp: 3 Month, Rfl: 3     albuterol (2.5 MG/3ML) 0.083% nebulizer solution, Take 1 vial (2.5 mg) by nebulization every 6 hours as needed for shortness of breath / dyspnea or wheezing, Disp: 1 Box, Rfl: 0     acetaminophen (TYLENOL) 500 MG tablet, Take 500-1,000 mg by mouth every 6 hours as needed for mild pain, Disp: , Rfl:      ORDER FOR DME, Equipment being ordered: CPAP, Disp: 1 Units, Rfl: 11     ASPIRIN NOT PRESCRIBED, INTENTIONAL,, by Other route continuous prn Reported on 3/24/2017, Disp: , Rfl: 0    Allergies:   Allergies   Allergen Reactions     Darvocet [Propoxyphene N-Apap] Anaphylaxis     Darvocet,Percocet      Percocet [Codeine] Anaphylaxis     Percocet [Oxycodone-Acetaminophen] Swelling, Anaphylaxis and Hives     Asa [Aspirin] Hives     Aspirin causes seizures and hives, throat swelling       Aspirin Hives     Bee      Lyrica [Pregabalin] Swelling and Unknown     Swelling.  dizziness     Iodine Rash     Reaction to topical betadine     Povidone Iodine Rash     Reaction to topical betadine     Tape [Adhesive Tape] Rash       REVIEW OF SYSTEMS:   CONSTITUTIONAL:NEGATIVE for fever, chills, night sweats  INTEGUMENTARY/SKIN: NEGATIVE for worrisome wound problems or " "redness.  MUSCULOSKELETAL:See HPI above  NEURO: NEGATIVE for weakness, dizziness or paresthesias    This document serves as a record of the services and decisions personally performed and made by Patel Rice MD. It was created on his behalf by Licha Nicole, a trained medical scribe. The creation of this document is based the provider's statements to the medical scribe.    Scribe Licha Nicole 1:25 PM 7/10/2017     PHYSICAL EXAM:  /76 (BP Location: Left arm, Patient Position: Chair, Cuff Size: Adult Large)  Pulse 71  Resp 20  Ht 1.499 m (4' 11\")  Wt 104.2 kg (229 lb 11.2 oz)  LMP 09/27/2009  SpO2 96%  BMI 46.39 kg/m2   GENERAL APPEARANCE: healthy, alert, no distress  SKIN: diffuse right upper extremity scab lesions, notably about the shoulder area  NEURO: Normal strength and tone, mentation intact and speech normal  PSYCH:  mentation appears normal and affect normal, not anxious  RESPIRATORY: No increased work of breathing.    right UPPER EXTREMITY:  Sensation intact to light touch in median, radial, ulnar and axillary nerve distributions  Palpable 2+ radial pulse, brisk capillary refill to all fingers, wwp  Intact epl fpl fdp edc wrist flexion/extension biceps triceps deltoid    right SHOULDER:  Shoulder Inspection: no swelling, no ecchymosis, no erythema, no maceration, no deformity; diffuse \"scab\" like lesions not related to surgical sites.  Tender: diffuse tenderness in arm and shoulder  Range of Motion: forward flexion: 120 degrees active / 150 passive   External rotation: 40 active, 60 degrees passive  Strength: forward flexion: 5-/5, external rotation: 5-/5.    X-RAY: none reviewed.      Impression: Bia Bill is a 51 year old female 5 months status post right shoulder arthroscopy and proximal biceps tenodesis, distal clavical resection, labral debridement, rotator cuff debridement, and subacromial decompression.     Plan: routine postoperative shoulder arthroscopy protocol  * based on exam, " her shoulder seems to continuously be improving, despite reinjury.  * likely a strain.  * WB status: weight bearing per comfort.  * Activity: avoid aggravating activities.  * Rest.  * Ice twice daily to three times daily.  * continue at home exercises.  * Tylenol as needed for pain  * return to clinic as needed.    The information in this document, created by a scribe for me, accurately reflects the services I personally performed and the decisions made by me. I have reviewed and approved this document for accuracy.      Patel Rice M.D., M.S.  Dept. of Orthopaedic Surgery  [facility]      Again, thank you for allowing me to participate in the care of your patient.        Sincerely,        Patel Rice MD

## 2017-07-10 NOTE — NURSING NOTE
"Chief Complaint   Patient presents with     Surgical Followup      Right shoulder cuff debridement, biceps tenodesis. DOS: 2/7/17. 5 months PO. Patient reports a fall about 2 weeks ago, she landed on her right elbow but felt pain in her right shoulder. She has bene having numbness, tingling and aching pain in her right shoulder since fall. PT completed 2 weeks ago.        Initial /76 (BP Location: Left arm, Patient Position: Chair, Cuff Size: Adult Large)  Pulse 71  Resp 20  Ht 1.499 m (4' 11\")  Wt 104.2 kg (229 lb 11.2 oz)  LMP 09/27/2009  SpO2 96%  BMI 46.39 kg/m2 Estimated body mass index is 46.39 kg/(m^2) as calculated from the following:    Height as of this encounter: 1.499 m (4' 11\").    Weight as of this encounter: 104.2 kg (229 lb 11.2 oz).  Medication Reconciliation: complete   Charlie Gill CMA      "

## 2017-07-10 NOTE — PATIENT INSTRUCTIONS
Please remember to call and schedule a follow up appointment if one was recommended at your earliest convenience.  Orthopedics CLINIC HOURS TELEPHONE NUMBER   Dr. Dwayne Desai  Certified Medical Assistant   Monday & Wednesday   8am - 5pm  Thursday 1pm - 5pm  Friday 8am -11:30am Specialty schedulers:   [phone] to schedule your surgery.  Main Clinic:   [phone] to make an appointment with any provider.    Urgent Care locations:    [facility] Monday-Friday Closed  Saturday-Sunday 9am-5pm      Monday-Friday 12pm - 8pm  Saturday-Sunday 9am-5pm [phone]     If SURGERY has been recommended, please call our Specialty Schedulers at [phone] to schedule your procedure.    If you need a medication refill, please contact your pharmacy. Please allow 3 business days for your refill to be completed.    If an MRI or CT scan has been recommended, please call [facility] Schedulers at [phone] to schedule your appointment.  Use [employer] (secure e-mail communication and access to your chart) to send a message or to make an appointment. Please ask how you can sign up for [employer].  Your care team's suggested websites for health information:   [email or web address] : Up to date and easily searchable information on multiple topics.   [email or web address] : [place] dept of heat, public health issues in [place], N1N1

## 2017-07-11 DIAGNOSIS — I73.9 PVD (PERIPHERAL VASCULAR DISEASE) WITH CLAUDICATION (H): Primary | ICD-10-CM

## 2017-07-13 ENCOUNTER — ANTICOAGULATION THERAPY VISIT (OUTPATIENT)
Dept: ANTICOAGULATION | Facility: CLINIC | Age: 52
End: 2017-07-13
Payer: COMMERCIAL

## 2017-07-13 DIAGNOSIS — I73.9 PVD (PERIPHERAL VASCULAR DISEASE) WITH CLAUDICATION (H): ICD-10-CM

## 2017-07-13 DIAGNOSIS — Z79.01 LONG TERM CURRENT USE OF ANTICOAGULANT THERAPY: ICD-10-CM

## 2017-07-13 PROCEDURE — 99207 ZZC NO CHARGE NURSE ONLY: CPT | Performed by: REGISTERED NURSE

## 2017-07-13 NOTE — MR AVS SNAPSHOT
Bia S Fly   7/13/2017   Anticoagulation Therapy Visit    Description:  51 year old female   Provider:  Brenna Rausch, RN   Department:  [facility]           INR as of 7/13/2017     Today's INR No new INR was available at the time of this encounter.      Anticoagulation Summary as of 7/13/2017     INR goal 2.0-3.0   Today's INR No new INR was available at the time of this encounter.   Full instructions 7/13: 20 mg; 7/14: 15 mg; Otherwise 10 mg on Mon, Wed, Fri; 15 mg all other days   Next INR check 8/2/2017    Indications   Long term current use of anticoagulant therapy [Z79.01]  DVT (deep venous thrombosis) (H) [I82.409]  PVD (peripheral vascular disease) with claudication (H) [I73.9]         Description     Take 20mg Thurs 7/13/17.  Take 15mg Fri 7/14/17.  Then resume usual dose, recheck INR 8/2/17.      July 2017 Details    Sun Mon Tue Wed Thu Fri Sat           1                 2               3               4               5               6               7               8                 9               10               11               12               13      20 mg   See details      14      15 mg         15      15 mg           16      15 mg         17      10 mg         18      15 mg         19      10 mg         20      15 mg         21      10 mg         22      15 mg           23      15 mg         24      10 mg         25      15 mg         26      10 mg         27      15 mg         28      10 mg         29      15 mg           30      15 mg         31      10 mg               Date Details   07/13 This INR check               How to take your warfarin dose     To take:  10 mg Take 2 of the 5 mg tablets.    To take:  15 mg Take 3 of the 5 mg tablets.    To take:  20 mg Take 4 of the 5 mg tablets.           August 2017 Details    Sun Mon Tue Wed Thu Fri Sat       1      15 mg         2            3               4               5                 6               7               8                9               10               11               12                 13               14               15               16               17               18               19                 20               21               22               23               24               25               26                 27               28               29               30               31                  Date Details   No additional details    Date of next INR:  8/2/2017         How to take your warfarin dose     To take:  10 mg Take 2 of the 5 mg tablets.    To take:  15 mg Take 3 of the 5 mg tablets.

## 2017-07-13 NOTE — PROGRESS NOTES
ANTICOAGULATION FOLLOW-UP CLINIC VISIT    Patient Name:  Bia Bill  Date:  7/13/2017  Contact Type:  Telephone/ discussed with Norah    SUBJECTIVE:     Patient Findings     Positives Missed doses (states she has missed two doses since she ran out of warfarin on Monday)    Comments Pt called [facility] reporting she had not received a call from [facility] that her warfarin has been refilled. Refill was authorized and sent there Tues 7/11 at 1601.             OBJECTIVE    INR   Date Value Ref Range Status   07/05/2017 2.20 (H) 0.86 - 1.14 Final     Comment:     Special tube used to correct for high hematocrit       ASSESSMENT / PLAN  No question data found.  Anticoagulation Summary as of 7/13/2017     INR goal 2.0-3.0   Today's INR No new INR was available at the time of this encounter.   Maintenance plan 10 mg (5 mg x 2) on Mon, Wed, Fri; 15 mg (5 mg x 3) all other days   Full instructions 7/13: 20 mg; 7/14: 15 mg; Otherwise 10 mg on Mon, Wed, Fri; 15 mg all other days   Weekly total 90 mg   Plan last modified Mireille Torres, RN (5/3/2017)   Next INR check 8/2/2017   Priority INR   Target end date Indefinite    Indications   Long term current use of anticoagulant therapy [Z79.01]  DVT (deep venous thrombosis) (H) [I82.409]  PVD (peripheral vascular disease) with claudication (H) [I73.9]         Anticoagulation Episode Summary     INR check location     Preferred lab     Send INR reminders to [facility] POOL    Comments * lab draw due to elevated hematocrit (fingerstick meters don't work). LEFT LE. STENT x 3 LEFT UPPER LEG. Previous arterial clot. 2/27/17 CURRENT PHONE # [phone]      Anticoagulation Care Providers     Provider Role Specialty Phone number    Kd Leong MD [facility] [phone]            See the Encounter Report to view Anticoagulation Flowsheet and Dosing Calendar (Go to Encounters tab in chart review, and find the Anticoagulation Therapy Visit)    Brenna Rausch,  RN

## 2017-07-17 ENCOUNTER — TELEPHONE (OUTPATIENT)
Dept: PHARMACY | Facility: CLINIC | Age: 52
End: 2017-07-17

## 2017-07-17 NOTE — TELEPHONE ENCOUNTER
This patient is due for MTM follow-up. I called the patient to schedule an appointment. Appointment scheduled for 7/20/17.    Eduardo Marquez PharmD  Medication Therapy Management Provider  Pager (voicemail): [phone]

## 2017-07-20 ENCOUNTER — OFFICE VISIT (OUTPATIENT)
Dept: PHARMACY | Facility: CLINIC | Age: 52
End: 2017-07-20
Payer: COMMERCIAL

## 2017-07-20 VITALS — DIASTOLIC BLOOD PRESSURE: 66 MMHG | SYSTOLIC BLOOD PRESSURE: 122 MMHG

## 2017-07-20 DIAGNOSIS — E63.9 NUTRITIONAL DEFICIENCY: ICD-10-CM

## 2017-07-20 DIAGNOSIS — M10.9 GOUT, UNSPECIFIED: ICD-10-CM

## 2017-07-20 DIAGNOSIS — E03.4 HYPOTHYROIDISM DUE TO ACQUIRED ATROPHY OF THYROID: ICD-10-CM

## 2017-07-20 DIAGNOSIS — J45.30 MILD PERSISTENT ASTHMA WITHOUT COMPLICATION: ICD-10-CM

## 2017-07-20 DIAGNOSIS — G63 POLYNEUROPATHY IN OTHER DISEASES CLASSIFIED ELSEWHERE (H): ICD-10-CM

## 2017-07-20 DIAGNOSIS — K21.9 GASTROESOPHAGEAL REFLUX DISEASE WITHOUT ESOPHAGITIS: ICD-10-CM

## 2017-07-20 DIAGNOSIS — M70.61 TROCHANTERIC BURSITIS OF BOTH HIPS: ICD-10-CM

## 2017-07-20 DIAGNOSIS — M70.62 TROCHANTERIC BURSITIS OF BOTH HIPS: ICD-10-CM

## 2017-07-20 DIAGNOSIS — F31.62 MODERATE MIXED BIPOLAR I DISORDER (H): Primary | ICD-10-CM

## 2017-07-20 DIAGNOSIS — G47.00 INSOMNIA, UNSPECIFIED TYPE: ICD-10-CM

## 2017-07-20 DIAGNOSIS — R60.9 EDEMA, UNSPECIFIED TYPE: ICD-10-CM

## 2017-07-20 DIAGNOSIS — F34.1 PERSONALITY DISORDER, DEPRESSIVE: ICD-10-CM

## 2017-07-20 DIAGNOSIS — Z79.01 LONG TERM CURRENT USE OF ANTICOAGULANT THERAPY: ICD-10-CM

## 2017-07-20 PROCEDURE — 99605 MTMS BY PHARM NP 15 MIN: CPT | Performed by: PHARMACIST

## 2017-07-20 PROCEDURE — 99607 MTMS BY PHARM ADDL 15 MIN: CPT | Performed by: PHARMACIST

## 2017-07-20 NOTE — PATIENT INSTRUCTIONS
Recommendations from today's MTM visit:                                                    MTM (medication therapy management) is a service provided by a clinical pharmacist designed to help you get the most of out of your medicines.   Today we reviewed what your medicines are for, how to know if they are working, that your medicines are safe and how to make your medicine regimen as easy as possible.     1. You could increase your Dulera to two puffs TWICE daily (this is how it is prescribed). Make sure to rinse your mouth.      Next MTM visit: 6 months or sooner if needed    To schedule another MTM appointment, please call the clinic directly or you may call the MTM scheduling line at [phone] or toll-free at [phone].     My Clinical Pharmacist's contact information:                                                      It was a pleasure seeing you today!  Please feel free to contact me with any questions or concerns you have.      Eduardo Marquez, Shahzad  Medication Therapy Management Provider  Pager (voicemail): [phone]    You may receive a survey about the MTM services you received.  I would appreciate your feedback to help me serve you better in the future. Please fill it out and return it when you can. Your comments will be anonymous.

## 2017-07-20 NOTE — PROGRESS NOTES
SUBJECTIVE/OBJECTIVE:                Bia Bill is a 51 year old female coming in for a follow-up visit for Medication Therapy Management.  She was referred to me from Dr. Leong.     Chief Complaint: Follow up from our visit on 8/15/16.  Comprehensive Medication Review  Personal Healthcare Goals: patient is trying to improve her health overall - eating better, trying to lose weight, and cut back on smoking  Tobacco: 0-1 pack per day - is interested in quitting Tobacco Cessation Action Plan: Pharmacotherapies : Zyban/Wellbutrin  slowing weaning her self down to 1/2 pack   Alcohol: not currently using - 22 years ago - drug free 33 years    Medication Adherence: no issues reported    Bipolar I Disorder/Depression: Pt is currently taking Abilify 7.55 mg daily, bupropion  mg daily, and lamotrigine 200 mg daily. Pt has been meeting with therapist, but recently her sister had a heart attack and she had to put her dog down after 15 years.  States she has been on a roller coaster, but things have been slowing down.  Feels that the medication shave been helpful. Denies any side effects.    Insomnia: Currently taking trazodone 150 mg nightly, which she finds to be effective. Denies any known side effects.     Gout: Pt currently taking allopurinol 100 mg daily. Pt states she has not had any issues since last visit with gout. Denies any known side effects.  Uric Acid   Date Value Ref Range Status   11/30/2015 4.5 2.6 - 6.0 mg/dL Final     Asthma:  Current asthma medications: Dulera - 2 puffs ONCE daily and albuterol as needed (has not needed). Pt rinses their mouth after using steroid inhaler. Finding breathing is tougher right now with the humid weather. Denies any issues.  ACT Total Scores 10/30/2015 4/14/2016 2/1/2017   ACT TOTAL SCORE - - -   ASTHMA ER VISITS - - -   ASTHMA HOSPITALIZATIONS - - -   ACT TOTAL SCORE (Goal Greater than or Equal to 20) 22 25 22   In the past 12 months, how many times did you  "visit the emergency room for your asthma without being admitted to the hospital? 0 0 0   In the past 12 months, how many times were you hospitalized overnight because of your asthma? 0 0 0     Neuropathy: Pt is taking gabapentin 600 mg three times daily plus additional 300 mg at bedtime. Helps with the nerve pain - if she doesn't take it she is \"kicking all the time\".  Finds very effective. Denies any side effects.    DVT: Currently taking warfarin managed by the  anticoagulation clinic. Last INR was 2.2.  Denies any issues with bruising/bleeding.    Edema: Pt is currently taking furosemide 20 mg twice daily.  She finds this helps keep her swelling down and her legs look good today. Previously did go to lymphedema clinic with good results. Denies any current side effects.    Leg Pain: Pt states a half a mile walk causes her to get a lot of pain in her legs.  She typically takes 2 tablets of acetaminophen if needed.  Finds to be effective. Denies any issues.    GERD: Current medications include: Prilosec (omeprazole) 20 mg once daily. Pt c/o no current symptoms.  Patient feels that current regimen is effective.    Hypothyroidism: Patient is taking levothyroxine 150 mcg daily. Patient is having the following symptoms: none.    Hyperlipidemia: Current therapy includes simvastatin 20 mg once daily.  Pt reports no significant myalgias or other side effects.     Supplements: Lactose intolerant. Pt taking calcium citrate + vitamin D3 - 2 tablets in the morning and 1 in the evening and cholecalcierol 1000 units daily. Recently started FatFighter supplement from [employer] and has lost 11 pounds, but also has been walking more and watching diet. Denies any issues.  FatFighter supplement facts      Current labs include:  BP Readings from Last 3 Encounters:   07/10/17 130/76   05/30/17 138/88   04/27/17 125/68     Today's Vitals: /66  LMP 09/27/2009  Lab Results   Component Value Date    A1C 6.2 12/29/2016   .  Lab " Results   Component Value Date    CHOL 154 03/15/2017     Lab Results   Component Value Date    TRIG 172 03/15/2017     Lab Results   Component Value Date    HDL 30 03/15/2017     Lab Results   Component Value Date    LDL 90 03/15/2017       Liver Function Studies -   Recent Labs   Lab Test  02/01/17   1547   PROTTOTAL  6.7*   ALBUMIN  3.4   BILITOTAL  0.5   ALKPHOS  112   AST  19   ALT  27     Last Basic Metabolic Panel:  Lab Results   Component Value Date     02/01/2017      Lab Results   Component Value Date    POTASSIUM 3.7 02/01/2017     Lab Results   Component Value Date    CHLORIDE 102 02/01/2017     Lab Results   Component Value Date    BUN 9 02/01/2017     Lab Results   Component Value Date    CR 0.79 02/01/2017     GFR Estimate   Date Value Ref Range Status   02/01/2017 77 >60 mL/min/1.7m2 Final     Comment:     Non  GFR Calc   08/23/2016 77 >60 mL/min/1.7m2 Final     Comment:     Non  GFR Calc   06/03/2016 >90  Non  GFR Calc   >60 mL/min/1.7m2 Final     TSH   Date Value Ref Range Status   03/15/2017 2.79 0.40 - 4.00 mU/L Final     Most Recent Immunizations   Administered Date(s) Administered     Influenza (IIV3) 10/14/2014     Influenza Vaccine IM 3yrs+ 4 Valent IIV4 10/07/2016     Pneumococcal 23 valent 10/13/2006     TD (ADULT, 7+) 07/24/2012     Tdap (Adacel,Boostrix) 02/11/2012       ASSESSMENT:              Current medications were reviewed today as discussed above.      Medication Adherence: no issues identified    Bipolar I Disorder/Depression: Improved per patient. Pt continues to become more stable as time goes by since recent life events.     Insomnia: Stable.     Gout: Stable per patient. Last uric acid level was <6 mg/dL.     Asthma:  Needs Improvement. Pt would likely benefit from using ICS/LABA as prescribed.     Neuropathy: Stable.    DVT: Stable. Last INR within goal of 2-3.     Edema: Stable.    Leg Pain: Stable.    GERD: Stable.   Current treatment is effective.    Hypothyroidism: Stable. Last TSH is within normal limits.     Supplements: Stable. FatFighter supplement safe to take with other medications.     PLAN:                  1. Increase Dulera to two puffs TWICE daily as prescribed.    I spent 45 minutes with this patient today. A copy of the visit note was provided to the patient's primary care provider.     Will follow up in 6 months or sooner if needed.    The patient was given a summary of these recommendations as an after visit summary.    Eduardo Marquez, Shahzad  Medication Therapy Management Provider  Pager (voicemail): [phone]

## 2017-07-20 NOTE — MR AVS SNAPSHOT
After Visit Summary   7/20/2017    Bia Bill    MRN: [id number]           Patient Information     Date Of Birth          1965        Visit Information        Provider Department      7/20/2017 2:30 PM Raulito Marquez, [facility] Instructions    Recommendations from today's MTM visit:                                                    MTM (medication therapy management) is a service provided by a clinical pharmacist designed to help you get the most of out of your medicines.   Today we reviewed what your medicines are for, how to know if they are working, that your medicines are safe and how to make your medicine regimen as easy as possible.     1. You could increase your Dulera to two puffs TWICE daily (this is how it is prescribed). Make sure to rinse your mouth.      Next MTM visit: 6 months or sooner if needed    To schedule another MTM appointment, please call the clinic directly or you may call the MTM scheduling line at [phone] or toll-free at [phone].     My Clinical Pharmacist's contact information:                                                      It was a pleasure seeing you today!  Please feel free to contact me with any questions or concerns you have.      Eduardo Marquez, Shahzad  Medication Therapy Management Provider  Pager (voicemail): [phone]    You may receive a survey about the MTM services you received.  I would appreciate your feedback to help me serve you better in the future. Please fill it out and return it when you can. Your comments will be anonymous.                  Follow-ups after your visit        Your next 10 appointments already scheduled     Aug 02, 2017 10:00 AM T   LAB with [place] [facility]   [facility]    [address] [address]   [phone]           Patient must bring picture ID.  Patient should be prepared to give a urine specimen  Please do  not eat 10-12 hours before your appointment if you are coming in fasting for labs on lipids, cholesterol, or glucose (sugar).  Pregnant women should follow their Care Team instructions. Water with medications is okay. Do not drink coffee or other fluids.   If you have concerns about taking  your medications, please ask at office or if scheduling via [employer], send a message by clicking on Secure Messaging, Message Your Care Team.            Aug 08, 2017  6:00 PM CDT   Return Visit with Kd Morris   [facility]    [address] [address]   [phone]            Sep 06, 2017 10:00 AM CDT   LAB with [facility] ([facility])    [address] [address]   [phone]           Patient must bring picture ID.  Patient should be prepared to give a urine specimen  Please do not eat 10-12 hours before your appointment if you are coming in fasting for labs on lipids, cholesterol, or glucose (sugar).  Pregnant women should follow their Care Team instructions. Water with medications is okay. Do not drink coffee or other fluids.   If you have concerns about taking  your medications, please ask at office or if scheduling via [employer], send a message by clicking on Secure Messaging, Message Your Care Team.            Sep 22, 2017  1:00 PM CDT   Return Visit with Joshua Watts MD   [facility] ([facility])    [facility]  [address]  [facility] [address]   [phone]              Who to contact     If you have questions or need follow up information about today's clinic visit or your schedule please contact [facility] directly at [phone].  Normal or non-critical lab and imaging results will be communicated to you by MyChart, letter or phone within 4 business days after the clinic has received the results. If you do not  "hear from us within 7 days, please contact the clinic through [employer] or phone. If you have a critical or abnormal lab result, we will notify you by phone as soon as possible.  Submit refill requests through [employer] or call your pharmacy and they will forward the refill request to us. Please allow 3 business days for your refill to be completed.          Additional Information About Your Visit        [employer] Information     [employer] lets you send messages to your doctor, view your test results, renew your prescriptions, schedule appointments and more. To sign up, go to [email or web address] . Click on \"Log in\" on the left side of the screen, which will take you to the Welcome page. Then click on \"Sign up Now\" on the right side of the page.     You will be asked to enter the access code listed below, as well as some personal information. Please follow the directions to create your username and password.     Your access code is: [email or web address]  Expires: 10/8/2017  1:43 PM     Your access code will  in 90 days. If you need help or a new code, please call your [place] clinic or [phone].        Care EveryWhere ID     This is your Care EveryWhere ID. This could be used by other organizations to access your [place] medical records  [id number]        Your Vitals Were     Last Period                   2009            Blood Pressure from Last 3 Encounters:   17 122/66   07/10/17 130/76   17 138/88    Weight from Last 3 Encounters:   07/10/17 229 lb 11.2 oz (104.2 kg)   17 230 lb 3.2 oz (104.4 kg)   17 231 lb 8 oz (105 kg)              Today, you had the following     No orders found for display       Primary Care Provider Office Phone # Fax #    Kd Leong [phone]       [facility] [address] [address] [address]        Equal Access to Services     ANTIONETTE IRBY AH: Hadii jose giraldo Sojossy, waaxda luqadaha, qaybta kaalmada obed, nitin " clare godoy jalil guevara'aan ah. So [facility] [phone].    ATENCIÓN: Si rashid magana, tiene a terry disposición servicios gratuitos de asistencia lingüística. Natalie hong [phone].    We comply with applicable federal civil rights laws and [place] laws. We do not discriminate on the basis of race, color, national origin, age, disability sex, sexual orientation or gender identity.            Thank you!     Thank you for choosing [facility]  for your care. Our goal is always to provide you with excellent care. Hearing back from our patients is one way we can continue to improve our services. Please take a few minutes to complete the written survey that you may receive in the mail after your visit with us. Thank you!             Your Updated Medication List - Protect others around you: Learn how to safely use, store and throw away your medicines at www.disposemymeds.org.          This list is accurate as of: 7/20/17  2:59 PM.  Always use your most recent med list.                   Brand Name Dispense Instructions for use Diagnosis    acetaminophen 500 MG tablet    TYLENOL     Take 500-1,000 mg by mouth every 6 hours as needed for mild pain        * albuterol (2.5 MG/3ML) 0.083% neb solution     1 Box    Take 1 vial (2.5 mg) by nebulization every 6 hours as needed for shortness of breath / dyspnea or wheezing    Cough       * albuterol 108 (90 BASE) MCG/ACT Inhaler    PROAIR HFA/PROVENTIL HFA/VENTOLIN HFA    3 Inhaler    Inhale 2 puffs into the lungs every 6 hours as needed for shortness of breath / dyspnea or wheezing    Acute bronchospasm       allopurinol 100 MG tablet    ZYLOPRIM    90 tablet    Take 1 tablet (100 mg) by mouth daily    Acute gouty arthritis       ARIPiprazole 5 MG tablet    ABILIFY    180 tablet    Take 1.5 tablets (7.5 mg) by mouth At Bedtime    Major depressive disorder, recurrent episode, moderate (H)       * ASPIRIN NOT PRESCRIBED    INTENTIONAL     by Other route  continuous prn Reported on 3/24/2017        buPROPion 150 MG 24 hr tablet    WELLBUTRIN XL    90 tablet    Take 1 tablet (150 mg) by mouth every morning    COPD exacerbation (H)       calcium citrate-vitamin D 315-200 MG-UNIT Tabs per tablet    CITRACAL     Take two tablet at dinner and one tablet at bedtime.        cholecalciferol 1000 UNIT tablet    vitamin D     Take 1 tablet by mouth daily        EPINEPHrine 0.3 MG/0.3ML injection 2-pack    EPIPEN/ADRENACLICK/or ANY BX GENERIC EQUIV    0.6 mL    Inject 0.3 mLs (0.3 mg) into the muscle once as needed for anaphylaxis    Anaphylactic reaction to bee sting, undetermined intent, subsequent encounter       furosemide 20 MG tablet    LASIX    180 tablet    Take 1 tablet (20 mg) by mouth 2 times daily    Lymphedema of both lower extremities       * gabapentin 600 MG tablet    NEURONTIN    90 tablet    Take 1 tablet (600 mg) by mouth 3 times daily    PVD (peripheral vascular disease) with claudication (H)       * gabapentin 300 MG capsule    NEURONTIN    90 capsule    Take 1 capsule (300 mg) by mouth At Bedtime Take with 600mg at bedtime. For total dose of 600mg in am 600 at noon and 900mg at bedtime    Major depressive disorder, recurrent episode, moderate (H), Restless leg syndrome       lamoTRIgine 200 MG tablet    LaMICtal    90 tablet    Take 1 tablet (200 mg) by mouth daily    Major depressive disorder, recurrent episode, moderate (H)       levothyroxine 150 MCG tablet    SYNTHROID/LEVOTHROID    90 tablet    Take 1 tablet (150 mcg) by mouth daily    Hypothyroidism, unspecified type       mometasone-formoterol 100-5 MCG/ACT oral inhaler    DULERA    13 g    Inhale 2 puffs into the lungs 2 times daily    Mild persistent asthma without complication       NUTRITIONAL SUPPLEMENT PO      Take 2 tablets by mouth daily ([employer]!)        omeprazole 20 MG CR capsule    priLOSEC    180 capsule    TAKE 1 CAPSULE (20 MG) BY MOUTH 2 TIMES DAILY    Gastroesophageal  "reflux disease without esophagitis       * order for DME      Equipment being ordered: CPAP AIRSENSE 10 5-18 CM H20 # [id number]   DN# [id number]        * order for DME     1 Units    Equipment being ordered: CPAP    GILES (obstructive sleep apnea)       * order for DME     3 Month    Equipment being ordered: CPAP supplies needed    GILES (obstructive sleep apnea)       * order for DME     1 Month    Equipment being ordered: INCONTINENCE PADS  QID    Other urinary incontinence       * order for DME     60 Month    Equipment being ordered: DEPENDS SIZE LARGE    Mixed incontinence       * order for DME     2 Device    Equipment being ordered:   \"[id number]\" x2pair  \"20-30mmHg Farrow Hybrid Liners\" x1pair    Lymphedema of both lower extremities       * order for DME     2 Units    Equipment being ordered: [id number]\" x2pair   \"20-30mmHg Farrow Hybrid Liners\" x 2 pair    Peripheral edema       simvastatin 20 MG tablet    ZOCOR    90 tablet    TAKE ONE TABLET BY MOUTH AT BEDTIME    Hypercholesteremia       traZODone 150 MG tablet    DESYREL    90 tablet    Take 1 tablet (150 mg) by mouth At Bedtime    Major depressive disorder, recurrent episode, moderate (H)       warfarin 5 MG tablet    COUMADIN    73 tablet    10mg MWF and 15mg the rest of the days of the week.    DVT (deep venous thrombosis) (H), Long term current use of anticoagulant therapy       * Notice:  This list has 12 medication(s) that are the same as other medications prescribed for you. Read the directions carefully, and ask your doctor or other care provider to review them with you.      "

## 2017-07-21 DIAGNOSIS — F33.1 MAJOR DEPRESSIVE DISORDER, RECURRENT EPISODE, MODERATE (H): ICD-10-CM

## 2017-07-21 RX ORDER — TRAZODONE HYDROCHLORIDE 150 MG/1
150 TABLET ORAL AT BEDTIME
Qty: 90 TABLET | Refills: 1 | Status: SHIPPED | OUTPATIENT
Start: 2017-07-21 | End: 2018-01-17

## 2017-07-21 NOTE — TELEPHONE ENCOUNTER
trazodone      Last Written Prescription Date:  1/18/17  Last Fill Quantity: 90,   # refills: 1  Last Office Visit with [facility], [facility] or [facility] prescribing provider: 5/30/17  Last date of pharmacy refill:  6/22/17  Future Office visit:    Next 5 appointments (look out 90 days)     Aug 08, 2017  6:00 PM CDT   Return Visit with Kd Morris   [facility] ([facility])    [address] [address]   [phone]            Sep 22, 2017  1:00 PM CDT   Return Visit with Joshua Watts MD   [facility] ([facility])    [facility]  [address]  [facility] [address]   [phone]                   Routing refill request to provider for review/approval because:  Drug not on the [facility], [facility] or [facility] refill protocol or controlled substance

## 2017-07-25 DIAGNOSIS — J44.1 COPD EXACERBATION (H): ICD-10-CM

## 2017-07-26 RX ORDER — BUPROPION HYDROCHLORIDE 150 MG/1
TABLET ORAL
Qty: 30 TABLET | Refills: 1 | Status: SHIPPED | OUTPATIENT
Start: 2017-07-26 | End: 2017-12-07

## 2017-07-26 NOTE — TELEPHONE ENCOUNTER
BUPROPN HCL X 150MG TAB PARP         Last Written Prescription Date: 11/15/16  Last Fill Quantity: 90; # refills: 3  Last Office Visit with G, [facility] or [facility] prescribing provider:  5/30/17   Next 5 appointments (look out 90 days)     Aug 08, 2017  6:00 PM CDT   Return Visit with Kd Morris   [facility] ([facility])    [address] [address]   [phone]            Sep 22, 2017  1:00 PM CDT   Return Visit with Joshua Watts MD   [facility] ([facility])    [facility]  [address]  [place] [address]   [phone]                   Last PHQ-9 score on record=   PHQ-9 SCORE 6/21/2017   Total Score 9   Total Score -       Lab Results   Component Value Date    AST 19 02/01/2017     Lab Results   Component Value Date    ALT 27 02/01/2017

## 2017-07-26 NOTE — TELEPHONE ENCOUNTER
Prescription approved per [facility] Refill Protocol.  Aubree Richardson RN

## 2017-08-02 DIAGNOSIS — D75.1 POLYCYTHEMIA, SECONDARY: ICD-10-CM

## 2017-08-02 DIAGNOSIS — Z86.718 HISTORY OF DEEP VENOUS THROMBOSIS: ICD-10-CM

## 2017-08-02 DIAGNOSIS — I73.9 PVD (PERIPHERAL VASCULAR DISEASE) (H): ICD-10-CM

## 2017-08-02 LAB
BASOPHILS # BLD AUTO: 0 10E9/L (ref 0–0.2)
BASOPHILS NFR BLD AUTO: 0.3 %
DIFFERENTIAL METHOD BLD: ABNORMAL
EOSINOPHIL # BLD AUTO: 0.1 10E9/L (ref 0–0.7)
EOSINOPHIL NFR BLD AUTO: 0.9 %
ERYTHROCYTE [DISTWIDTH] IN BLOOD BY AUTOMATED COUNT: 21.4 % (ref 10–15)
HCT VFR BLD AUTO: 60.4 % (ref 35–47)
HGB BLD-MCNC: 19.4 G/DL (ref 11.7–15.7)
IMM GRANULOCYTES # BLD: 0 10E9/L (ref 0–0.4)
IMM GRANULOCYTES NFR BLD: 0.3 %
INR PPP: NORMAL (ref 0.86–1.14)
LYMPHOCYTES # BLD AUTO: 1.5 10E9/L (ref 0.8–5.3)
LYMPHOCYTES NFR BLD AUTO: 21.6 %
MCH RBC QN AUTO: 28.2 PG (ref 26.5–33)
MCHC RBC AUTO-ENTMCNC: 32.1 G/DL (ref 31.5–36.5)
MCV RBC AUTO: 88 FL (ref 78–100)
MONOCYTES # BLD AUTO: 0.5 10E9/L (ref 0–1.3)
MONOCYTES NFR BLD AUTO: 7.8 %
NEUTROPHILS # BLD AUTO: 4.7 10E9/L (ref 1.6–8.3)
NEUTROPHILS NFR BLD AUTO: 69.1 %
PLATELET # BLD AUTO: 106 10E9/L (ref 150–450)
RBC # BLD AUTO: 6.89 10E12/L (ref 3.8–5.2)
WBC # BLD AUTO: 6.8 10E9/L (ref 4–11)

## 2017-08-02 PROCEDURE — 36415 COLL VENOUS BLD VENIPUNCTURE: CPT | Performed by: FAMILY MEDICINE

## 2017-08-02 PROCEDURE — 85025 COMPLETE CBC W/AUTO DIFF WBC: CPT | Performed by: FAMILY MEDICINE

## 2017-08-03 ENCOUNTER — ANTICOAGULATION THERAPY VISIT (OUTPATIENT)
Dept: ANTICOAGULATION | Facility: CLINIC | Age: 52
End: 2017-08-03
Payer: COMMERCIAL

## 2017-08-03 DIAGNOSIS — I73.9 PVD (PERIPHERAL VASCULAR DISEASE) WITH CLAUDICATION (H): ICD-10-CM

## 2017-08-03 DIAGNOSIS — Z79.01 LONG TERM CURRENT USE OF ANTICOAGULANT THERAPY: ICD-10-CM

## 2017-08-03 PROCEDURE — 99207 ZZC NO CHARGE NURSE ONLY: CPT | Performed by: REGISTERED NURSE

## 2017-08-03 NOTE — PROGRESS NOTES
ANTICOAGULATION FOLLOW-UP CLINIC VISIT    Patient Name:  Bia Bill  Date:  8/3/2017  Contact Type:  Telephone/ discussed with pt    SUBJECTIVE:     Patient Findings     Positives No Problem Findings (none reported)           OBJECTIVE    INR   Date Value Ref Range Status   08/02/2017  0.86 - 1.14 Final    Canceled, Test credited   Specimen improperly labeled         ASSESSMENT / PLAN  No question data found.  Anticoagulation Summary as of 8/3/2017     INR goal 2.0-3.0   Today's INR Canceled, Test credited  Specimen improperly labeled  (8/2/2017)   The selected INR is not numeric and cannot be flagged as in or out of the INR goal range.   Maintenance plan 10 mg (5 mg x 2) on Mon, Wed, Fri; 15 mg (5 mg x 3) all other days   Full instructions 10 mg on Mon, Wed, Fri; 15 mg all other days   Weekly total 90 mg   No change documented Brenna Rausch RN   Plan last modified Mireille Torres RN (5/3/2017)   Next INR check 8/10/2017   Priority INR   Target end date Indefinite    Indications   Long term current use of anticoagulant therapy [Z79.01]  DVT (deep venous thrombosis) (H) [I82.409]  PVD (peripheral vascular disease) with claudication (H) [I73.9]         Anticoagulation Episode Summary     INR check location     Preferred lab     Send INR reminders to [place] PHONE ANTICOAG POOL    Comments * lab draw due to elevated hematocrit (fingerstick meters don't work). LEFT LE. STENT x 3 LEFT UPPER LEG. Previous arterial clot. 2/27/17 CURRENT PHONE # [phone]      Anticoagulation Care Providers     Provider Role Specialty Phone number    Kd Leong MD [facility] [phone]            See the Encounter Report to view Anticoagulation Flowsheet and Dosing Calendar (Go to Encounters tab in chart review, and find the Anticoagulation Therapy Visit)    Brenna Rausch, TAWANA

## 2017-08-03 NOTE — MR AVS SNAPSHOT
Bia Bill   8/3/2017   Anticoagulation Therapy Visit    Description:  51 year old female   Provider:  Brenna Rausch, RN   Department:  [place] [facility]           INR as of 8/3/2017     Today's INR Canceled, Test credited  Specimen improperly labeled  (8/2/2017)    The selected INR is not numeric and cannot be flagged as in or out of the INR goal range.      Anticoagulation Summary as of 8/3/2017     INR goal 2.0-3.0   Today's INR Canceled, Test credited  Specimen improperly labeled  (8/2/2017)   The selected INR is not numeric and cannot be flagged as in or out of the INR goal range.   Full instructions 10 mg on Mon, Wed, Fri; 15 mg all other days   Next INR check 8/10/2017    Indications   Long term current use of anticoagulant therapy [Z79.01]  DVT (deep venous thrombosis) (H) [I82.409]  PVD (peripheral vascular disease) with claudication (H) [I73.9]         Description     Continue warfarin and recheck INR next Thursday 8/10/17.      August 2017 Details    Sun Mon Tue Wed Thu Fri Sat       1               2               3      15 mg   See details      4      10 mg         5      15 mg           6      15 mg         7      10 mg         8      15 mg         9      10 mg         10            11               12                 13               14               15               16               17               18               19                 20               21               22               23               24               25               26                 27               28               29               30               31                  Date Details   08/03 This INR check       Date of next INR:  8/10/2017         How to take your warfarin dose     To take:  10 mg Take 2 of the 5 mg tablets.    To take:  15 mg Take 3 of the 5 mg tablets.

## 2017-08-07 DIAGNOSIS — I82.409 DVT (DEEP VENOUS THROMBOSIS) (H): ICD-10-CM

## 2017-08-07 DIAGNOSIS — Z79.01 LONG TERM CURRENT USE OF ANTICOAGULANT THERAPY: ICD-10-CM

## 2017-08-07 DIAGNOSIS — I73.9 PVD (PERIPHERAL VASCULAR DISEASE) WITH CLAUDICATION (H): Primary | ICD-10-CM

## 2017-08-07 RX ORDER — WARFARIN SODIUM 5 MG/1
TABLET ORAL
Qty: 73 TABLET | Refills: 1 | Status: SHIPPED | OUTPATIENT
Start: 2017-08-07 | End: 2017-09-12

## 2017-08-07 RX ORDER — WARFARIN SODIUM 5 MG/1
TABLET ORAL
Qty: 72 TABLET | Refills: 2 | Status: SHIPPED | OUTPATIENT
Start: 2017-08-07 | End: 2017-09-29

## 2017-08-07 NOTE — TELEPHONE ENCOUNTER
WARFARIN 5 MG TAB TEVA      Last Written Prescription Date: 7/11/17  Last Fill Qty: 73, # refills: 0  Last Office Visit with G, [facility] or [facility] prescribing provider: 5/30/17  Next 5 appointments (look out 90 days)     Aug 08, 2017  6:00 PM CDT   Return Visit with Kd Morris   [facility])    [address] [address]   [phone]            Sep 22, 2017  1:00 PM CDT   Return Visit with Joshua Watts MD   [facility] ([facility])    [facility]  [address]  [facility] [address] [phone]                   Date and Result of Last PT/INR:   Lab Results   Component Value Date    INR  08/02/2017     Canceled, Test credited   Specimen improperly labeled      INR 2.20 07/05/2017

## 2017-08-08 ENCOUNTER — OFFICE VISIT (OUTPATIENT)
Dept: PSYCHOLOGY | Facility: CLINIC | Age: 52
End: 2017-08-08
Payer: COMMERCIAL

## 2017-08-08 DIAGNOSIS — E56.9 VITAMIN DEFICIENCY: Primary | ICD-10-CM

## 2017-08-08 DIAGNOSIS — F31.62 MODERATE MIXED BIPOLAR I DISORDER (H): Primary | ICD-10-CM

## 2017-08-08 PROCEDURE — 90834 PSYTX W PT 45 MINUTES: CPT | Performed by: PSYCHOLOGIST

## 2017-08-08 NOTE — TELEPHONE ENCOUNTER
VITAMIN D3 1000 UNITS tablet      Last Written Prescription Date: not stated  Last Fill Quantity: na,  # refills: na   Last Office Visit with [facility], [facility] or [facility] prescribing provider: 5/30/17                                         Next 5 appointments (look out 90 days)     Aug 08, 2017  6:00 PM CDT   Return Visit with Kd Morris   [facility] ([facility])    [address] [address]   [phone]            Sep 22, 2017  1:00 PM CDT   Return Visit with Joshua Watts MD   [facility])    [facility]  [address]  [facility] [address]   [phone]

## 2017-08-08 NOTE — MR AVS SNAPSHOT
"                  MRN:[id number]                      After Visit Summary   8/8/2017    Bia Bill    MRN: [id number]           Visit Information        Provider Department      8/8/2017 6:00 PM Arturo Kd CHAWLA [facility]      Your next 10 appointments already scheduled     Sep 06, 2017 10:00 AM CDT   LAB with [facility] ([facility])    [address] [address]   [phone]           Patient must bring picture ID. Patient should be prepared to give a urine specimen  Please do not eat 10-12 hours before your appointment if you are coming in fasting for labs on lipids, cholesterol, or glucose (sugar). Pregnant women should follow their Care Team instructions. Water with medications is okay. Do not drink coffee or other fluids. If you have concerns about taking  your medications, please ask at office or if scheduling via [employer], send a message by clicking on Secure Messaging, Message Your Care Team.            Sep 12, 2017 12:00 PM CDT   Return Visit with Kd Morris   [facility] ([facility])    [address] [address]   [phone]            Sep 22, 2017  1:00 PM CDT   Return Visit with Joshua Watts MD   [facility] ([facility])    [facility]  [address]  [place] [address]   [phone]            Sep 26, 2017 10:30 AM CDT   Return Visit with Kd Morris   [facility] ([facility])    [address] [address]   [phone]              MyChart Information     [employer] lets you send messages to your doctor, view your test results, renew your prescriptions, schedule appointments and more. To sign up, go to [email or web address] . Click on \"Log in\" on the left side of the screen, which will take you to the Welcome page. Then click on \"Sign " "up Now\" on the right side of the page.     You will be asked to enter the access code listed below, as well as some personal information. Please follow the directions to create your username and password.     Your access code is: [email or web address]  Expires: 10/8/2017  1:43 PM     Your access code will  in 90 days. If you need help or a new code, please call your [place] clinic or [phone].        Care EveryWhere ID     This is your Care EveryWhere ID. This could be used by other organizations to access your [place] medical records  [id number]        Equal Access to Services     ANTIONETTE [facility] HospitalASUNCION : Elfego Clark, waldemar rust, emma clay, nitin craig. So [facility] [phone].    ATENCIÓN: Si habla español, tiene a terry disposición servicios gratuitos de asistencia lingüística. Llame al [phone].    We comply with applicable federal civil rights laws and [place] laws. We do not discriminate on the basis of race, color, national origin, age, disability sex, sexual orientation or gender identity.            "

## 2017-08-09 RX ORDER — CHOLECALCIFEROL (VITAMIN D3) 25 MCG
1000 TABLET ORAL DAILY
Qty: 30 TABLET | Refills: 3 | Status: SHIPPED | OUTPATIENT
Start: 2017-08-09 | End: 2017-09-22

## 2017-08-09 NOTE — TELEPHONE ENCOUNTER
Routing refill request to provider for review/approval because:  Medication is reported/historical  Mundo Conde RN

## 2017-08-10 ENCOUNTER — ANTICOAGULATION THERAPY VISIT (OUTPATIENT)
Dept: ANTICOAGULATION | Facility: CLINIC | Age: 52
End: 2017-08-10
Payer: COMMERCIAL

## 2017-08-10 DIAGNOSIS — Z79.01 LONG TERM CURRENT USE OF ANTICOAGULANT THERAPY: ICD-10-CM

## 2017-08-10 DIAGNOSIS — I73.9 PERIPHERAL VASCULAR DISEASE WITH CLAUDICATION (H): ICD-10-CM

## 2017-08-10 DIAGNOSIS — D75.1 POLYCYTHEMIA, SECONDARY: ICD-10-CM

## 2017-08-10 DIAGNOSIS — I82.409 DVT (DEEP VENOUS THROMBOSIS) (H): ICD-10-CM

## 2017-08-10 DIAGNOSIS — I73.9 PVD (PERIPHERAL VASCULAR DISEASE) WITH CLAUDICATION (H): ICD-10-CM

## 2017-08-10 LAB
BASOPHILS # BLD AUTO: 0 10E9/L (ref 0–0.2)
BASOPHILS NFR BLD AUTO: 0.6 %
DIFFERENTIAL METHOD BLD: ABNORMAL
EOSINOPHIL # BLD AUTO: 0.1 10E9/L (ref 0–0.7)
EOSINOPHIL NFR BLD AUTO: 1 %
ERYTHROCYTE [DISTWIDTH] IN BLOOD BY AUTOMATED COUNT: 21.8 % (ref 10–15)
HCT VFR BLD AUTO: 59.4 % (ref 35–47)
HGB BLD-MCNC: 19.2 G/DL (ref 11.7–15.7)
INR PPP: 2.87 (ref 0.86–1.14)
LYMPHOCYTES # BLD AUTO: 1.4 10E9/L (ref 0.8–5.3)
LYMPHOCYTES NFR BLD AUTO: 26 %
MCH RBC QN AUTO: 28.8 PG (ref 26.5–33)
MCHC RBC AUTO-ENTMCNC: 32.3 G/DL (ref 31.5–36.5)
MCV RBC AUTO: 89 FL (ref 78–100)
MONOCYTES # BLD AUTO: 0.5 10E9/L (ref 0–1.3)
MONOCYTES NFR BLD AUTO: 9 %
NEUTROPHILS # BLD AUTO: 3.3 10E9/L (ref 1.6–8.3)
NEUTROPHILS NFR BLD AUTO: 63.4 %
PLATELET # BLD AUTO: 103 10E9/L (ref 150–450)
RBC # BLD AUTO: 6.66 10E12/L (ref 3.8–5.2)
WBC # BLD AUTO: 5.2 10E9/L (ref 4–11)

## 2017-08-10 PROCEDURE — 36415 COLL VENOUS BLD VENIPUNCTURE: CPT | Performed by: FAMILY MEDICINE

## 2017-08-10 PROCEDURE — 85610 PROTHROMBIN TIME: CPT | Performed by: FAMILY MEDICINE

## 2017-08-10 PROCEDURE — 99207 ZZC NO CHARGE NURSE ONLY: CPT | Performed by: REGISTERED NURSE

## 2017-08-10 PROCEDURE — 85025 COMPLETE CBC W/AUTO DIFF WBC: CPT | Performed by: FAMILY MEDICINE

## 2017-08-10 NOTE — PROGRESS NOTES
ANTICOAGULATION FOLLOW-UP CLINIC VISIT    Patient Name:  Bia Bill  Date:  8/10/2017  Contact Type:  Telephone/ discussed with pt    SUBJECTIVE:        OBJECTIVE    INR   Date Value Ref Range Status   08/10/2017 2.87 (H) 0.86 - 1.14 Final       ASSESSMENT / PLAN  INR assessment THER    Recheck INR In: 4 WEEKS    INR Location Clinic      Anticoagulation Summary as of 8/10/2017     INR goal 2.0-3.0   Today's INR 2.87   Maintenance plan 10 mg (5 mg x 2) on Mon, Wed, Fri; 15 mg (5 mg x 3) all other days   Full instructions 10 mg on Mon, Wed, Fri; 15 mg all other days   Weekly total 90 mg   No change documented Brenna Rausch RN   Plan last modified Mireille Torres RN (5/3/2017)   Next INR check 9/6/2017   Priority INR   Target end date Indefinite    Indications   Long term current use of anticoagulant therapy [Z79.01]  DVT (deep venous thrombosis) (H) [I82.409]  PVD (peripheral vascular disease) with claudication (H) [I73.9]         Anticoagulation Episode Summary     INR check location     Preferred lab     Send INR reminders to [place] PHONE ANTICOAG POOL    Comments * lab draw due to elevated hematocrit (fingerstick meters don't work). LEFT LE. STENT x 3 LEFT UPPER LEG. Previous arterial clot. 2/27/17 CURRENT PHONE # [phone]      Anticoagulation Care Providers     Provider Role Specialty Phone number    Kd Leong MD [facility] [phone]            See the Encounter Report to view Anticoagulation Flowsheet and Dosing Calendar (Go to Encounters tab in chart review, and find the Anticoagulation Therapy Visit)    Brenna Rausch, RN

## 2017-08-10 NOTE — MR AVS SNAPSHOT
Bia Bill   8/10/2017   Anticoagulation Therapy Visit    Description:  51 year old female   Provider:  Brenna Rausch, RN   [facility]           INR as of 8/10/2017     Today's INR 2.87      Anticoagulation Summary as of 8/10/2017     INR goal 2.0-3.0   Today's INR 2.87   Full instructions 10 mg on Mon, Wed, Fri; 15 mg all other days   Next INR check 9/6/2017    Indications   Long term current use of anticoagulant therapy [Z79.01]  DVT (deep venous thrombosis) (H) [I82.409]  PVD (peripheral vascular disease) with claudication (H) [I73.9]         Description     No change, recheck INR 9/6/2017.      August 2017 Details    Sun Mon Tue Wed Thu Fri Sat       1               2               3               4               5                 6               7               8               9               10      15 mg   See details      11      10 mg         12      15 mg           13      15 mg         14      10 mg         15      15 mg         16      10 mg         17      15 mg         18      10 mg         19      15 mg           20      15 mg         21      10 mg         22      15 mg         23      10 mg         24      15 mg         25      10 mg         26      15 mg           27      15 mg         28      10 mg         29      15 mg         30      10 mg         31      15 mg            Date Details   08/10 This INR check               How to take your warfarin dose     To take:  10 mg Take 2 of the 5 mg tablets.    To take:  15 mg Take 3 of the 5 mg tablets.           September 2017 Details    Sun Mon Tue Wed Thu Fri Sat          1      10 mg         2      15 mg           3      15 mg         4      10 mg         5      15 mg         6            7               8               9                 10               11               12               13               14               15               16                 17               18               19               20                21               22               23                 24               25               26               27               28               29               30                Date Details   No additional details    Date of next INR:  9/6/2017         How to take your warfarin dose     To take:  10 mg Take 2 of the 5 mg tablets.    To take:  15 mg Take 3 of the 5 mg tablets.
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
## 2017-08-17 NOTE — PROGRESS NOTES
Outpatient Physical Therapy Discharge Note     Patient: Bia Bill  : 1965    Beginning/End Dates of Reporting Period:  17 to 2017    Referring Provider: KAILEE Franklin    Therapy Diagnosis: s/p right rotator cuff debridement and biceps tenodesis performed 17     Client Self Report: Fell over the weekend, landing on her right hip and elbow.  Jammed the right shoulder a little bit.  The shoulder is feeling OK though, able to reach and take a plate out of the top cupboard.      Objective Measurements:  Objective Measure: PROM  Details: Flexion=160 deg; ER=65 deg  Objective Measure: AROM  Details: Icqpluf=503 deg (left=150 deg); ER=60 deg (Left ER=65 deg)  Objective Measure: MMT  Details: ER=4+/5 with lateral shoulder pain; IR=5/5 pain free; Flexion=4/5 with mild lateral shoulder pain  Objective Measure: SPADI     Goals:  Goal Identifier     Goal Description Following PT interventions, patient will have >=170 deg right GH flexion AROM to allow patient to reach top cupboard.   Target Date 17   Date Met      Progress:     Goal Identifier     Goal Description Following PT interventions, patient will have >=4/5 right GH ER strength to reduce impingement with overhead ADL's.   Target Date 17   Date Met  17   Progress:     Goal Identifier     Goal Description Following PT interventions, patient will be able to put on and off a sweatshirt with <3/10 right shoulder pain.   Target Date 17   Date Met  17   Progress:     Progress Toward Goals:   Progress this reporting period: Norah made good progress with pain free AROM, global shoulder strength, and reduced pain.  She continued to have difficulty with end range flexion range of motion, however was motivated to progress independently to achieve this with her HEP.        Plan:  Discharge from therapy.    Discharge:    Reason for Discharge: No further expectation of progress; patient is independent with her long term  HEP    Equipment Issued: Theraband    Discharge Plan: Patient to continue home program.      Orquidea Orellana, PT, DPT  Doctor of Physical Therapy #[id number]  [facility]  [phone]  [email or web address]

## 2017-09-05 DIAGNOSIS — G25.81 RLS (RESTLESS LEGS SYNDROME): ICD-10-CM

## 2017-09-05 DIAGNOSIS — M50.30 DDD (DEGENERATIVE DISC DISEASE), CERVICAL: ICD-10-CM

## 2017-09-05 NOTE — TELEPHONE ENCOUNTER
CITRUS CALCIUM +D [id number] TABS        Last Written Prescription Date: not stated  Last Fill Quantity: na,  # refills: na   Last Office Visit with [facility], P or [facility] prescribing provider: 5/30/17                                         Next 5 appointments (look out 90 days)     Sep 12, 2017 12:00 PM CDT   [facility]    [address] [place] [address]   [phone]            Sep 22, 2017  1:00 PM CDT   Return Visit with Joshua Watts MD   [facility] ([facility])    [facility]  [address]  [facility] [place] [address]   [phone]            Sep 26, 2017 10:30 AM CDT   Return Visit with Kd Morris   [facility] ([facility])    [address] [address]   [phone]

## 2017-09-06 NOTE — TELEPHONE ENCOUNTER
Routing refill request to provider for review/approval because:  Would like PRovider to decide. Thank you.  Mundo Conde RN

## 2017-09-12 ENCOUNTER — OFFICE VISIT (OUTPATIENT)
Dept: PSYCHOLOGY | Facility: CLINIC | Age: 52
End: 2017-09-12
Payer: COMMERCIAL

## 2017-09-12 ENCOUNTER — ANTICOAGULATION THERAPY VISIT (OUTPATIENT)
Dept: ANTICOAGULATION | Facility: CLINIC | Age: 52
End: 2017-09-12
Payer: COMMERCIAL

## 2017-09-12 DIAGNOSIS — I82.409 DVT (DEEP VENOUS THROMBOSIS) (H): ICD-10-CM

## 2017-09-12 DIAGNOSIS — Z79.01 LONG TERM CURRENT USE OF ANTICOAGULANT THERAPY: ICD-10-CM

## 2017-09-12 DIAGNOSIS — F31.62 MODERATE MIXED BIPOLAR I DISORDER (H): Primary | ICD-10-CM

## 2017-09-12 DIAGNOSIS — D75.1 POLYCYTHEMIA, SECONDARY: ICD-10-CM

## 2017-09-12 DIAGNOSIS — I73.9 PVD (PERIPHERAL VASCULAR DISEASE) WITH CLAUDICATION (H): ICD-10-CM

## 2017-09-12 DIAGNOSIS — I89.0 LYMPHEDEMA OF BOTH LOWER EXTREMITIES: ICD-10-CM

## 2017-09-12 DIAGNOSIS — I73.9 PERIPHERAL VASCULAR DISEASE WITH CLAUDICATION (H): ICD-10-CM

## 2017-09-12 LAB
BASOPHILS # BLD AUTO: 0 10E9/L (ref 0–0.2)
BASOPHILS NFR BLD AUTO: 0.3 %
DIFFERENTIAL METHOD BLD: ABNORMAL
EOSINOPHIL # BLD AUTO: 0.1 10E9/L (ref 0–0.7)
EOSINOPHIL NFR BLD AUTO: 1.1 %
ERYTHROCYTE [DISTWIDTH] IN BLOOD BY AUTOMATED COUNT: 21.5 % (ref 10–15)
HCT VFR BLD AUTO: 61.4 % (ref 35–47)
HGB BLD-MCNC: 19.6 G/DL (ref 11.7–15.7)
IMM GRANULOCYTES # BLD: 0 10E9/L (ref 0–0.4)
IMM GRANULOCYTES NFR BLD: 0.2 %
INR PPP: 3.92 (ref 0.86–1.14)
LYMPHOCYTES # BLD AUTO: 1.5 10E9/L (ref 0.8–5.3)
LYMPHOCYTES NFR BLD AUTO: 24 %
MCH RBC QN AUTO: 28.2 PG (ref 26.5–33)
MCHC RBC AUTO-ENTMCNC: 32.4 G/DL (ref 31.5–36.5)
MCV RBC AUTO: 87 FL (ref 78–100)
MONOCYTES # BLD AUTO: 0.5 10E9/L (ref 0–1.3)
MONOCYTES NFR BLD AUTO: 7.6 %
NEUTROPHILS # BLD AUTO: 4.2 10E9/L (ref 1.6–8.3)
NEUTROPHILS NFR BLD AUTO: 66.8 %
PLATELET # BLD AUTO: 112 10E9/L (ref 150–450)
RBC # BLD AUTO: 6.95 10E12/L (ref 3.8–5.2)
WBC # BLD AUTO: 6.3 10E9/L (ref 4–11)

## 2017-09-12 PROCEDURE — 36415 COLL VENOUS BLD VENIPUNCTURE: CPT | Performed by: FAMILY MEDICINE

## 2017-09-12 PROCEDURE — 85610 PROTHROMBIN TIME: CPT | Performed by: FAMILY MEDICINE

## 2017-09-12 PROCEDURE — 85025 COMPLETE CBC W/AUTO DIFF WBC: CPT | Performed by: FAMILY MEDICINE

## 2017-09-12 PROCEDURE — 99207 ZZC NO CHARGE NURSE ONLY: CPT

## 2017-09-12 PROCEDURE — 90834 PSYTX W PT 45 MINUTES: CPT | Performed by: PSYCHOLOGIST

## 2017-09-12 NOTE — MR AVS SNAPSHOT
Bia Bill   9/12/2017   Anticoagulation Therapy Visit    Description:  51 year old female   Provider:  Melisa Murphy RN   [facility]           INR as of 9/12/2017     Today's INR 3.92!      Anticoagulation Summary as of 9/12/2017     INR goal 2.0-3.0   Today's INR 3.92!   Full instructions 9/12: 5 mg; Otherwise 10 mg on Mon, Wed, Fri; 15 mg all other days   Next INR check 9/22/2017    Indications   Long term current use of anticoagulant therapy [Z79.01]  DVT (deep venous thrombosis) (H) [I82.409]  PVD (peripheral vascular disease) with claudication (H) [I73.9]         September 2017 Details    Sun Mon Tue Wed Thu Fri Sat          1               2                 3               4               5               6               7               8               9                 10               11               12      5 mg   See details      13      10 mg         14      15 mg         15      10 mg         16      15 mg           17      15 mg         18      10 mg         19      15 mg         20      10 mg         21      15 mg         22            23                 24               25               26               27               28               29               30                Date Details   09/12 This INR check       Date of next INR:  9/22/2017         How to take your warfarin dose     To take:  5 mg Take 1 of the 5 mg tablets.    To take:  10 mg Take 2 of the 5 mg tablets.    To take:  15 mg Take 3 of the 5 mg tablets.

## 2017-09-12 NOTE — MR AVS SNAPSHOT
"                  MRN:[id number]                      After Visit Summary   2017    Bia Bill    MRN: [id number]           Visit Information        Provider Department      2017 12:00 PM Kd Morris [facility]      Your next 10 appointments already scheduled     Sep 22, 2017  1:00 PM CDT   Return Visit with Joshua Watts MD   [facility] ([facility])    [facility]  [address]  [facility] [address]   [phone]            Sep 26, 2017 10:30 AM CDT   Return Visit with Kd Morris   [facility]    [address] [address]   [phone]            Oct 10, 2017 10:30 AM CDT   Return Visit with Kd Morris   [facility] ([facility])    [address] [address]   [phone]              MyChart Information     [employer] lets you send messages to your doctor, view your test results, renew your prescriptions, schedule appointments and more. To sign up, go to [email or web address] . Click on \"Log in\" on the left side of the screen, which will take you to the Welcome page. Then click on \"Sign up Now\" on the right side of the page.     You will be asked to enter the access code listed below, as well as some personal information. Please follow the directions to create your username and password.     Your access code is: [email or web address]  Expires: 10/8/2017  1:43 PM     Your access code will  in 90 days. If you need help or a new code, please call your [facility] clinic or [phone].        Care EveryWhere ID     This is your Care EveryWhere ID. This could be used by other organizations to access your [facility] medical records  [id number]        Equal Access to Services     ANTIONETTE IRBY AH: Elfego Clark, waldemar rust, emma clay, nitin godoy " jalil roberts ah. So [facility] [phone].    ATENCIÓN: Si habla español, tiene a terry disposición servicios gratuitos de asistencia lingüística. Llame al [phone].    We comply with applicable federal civil rights laws and [place] laws. We do not discriminate on the basis of race, color, national origin, age, disability sex, sexual orientation or gender identity.

## 2017-09-12 NOTE — TELEPHONE ENCOUNTER
FUROSEMIDE 20 MG TAB SUNNY        Last Written Prescription Date: 11/16/16  Last Fill Quantity: 180, # refills: 2  Last Office Visit with [facility], [facility] or [facility] prescribing provider: 5/30/17  Next 5 appointments (look out 90 days)     Sep 12, 2017 12:00 PM CDT   [facility]    [address] [address]   [phone]            Sep 22, 2017  1:00 PM CDT   Return Visit with Joshua Watts MD   [facility] ([facility])    [facility]  [address] [place] [address]   [phone]            Sep 26, 2017 10:30 AM CDT   Return Visit with Kd Morris   [facility] ([facility])    [address] [address]   [phone]                   Potassium   Date Value Ref Range Status   02/01/2017 3.7 3.4 - 5.3 mmol/L Final     Creatinine   Date Value Ref Range Status   02/01/2017 0.79 0.52 - 1.04 mg/dL Final     BP Readings from Last 3 Encounters:   07/20/17 122/66   07/10/17 130/76   05/30/17 138/88

## 2017-09-12 NOTE — PROGRESS NOTES
ANTICOAGULATION FOLLOW-UP CLINIC VISIT    Patient Name:  Bia Bill  Date:  9/12/2017  Contact Type:  Telephone/ spoke with patient    SUBJECTIVE:     Patient Findings     Positives Nose bleeds (Pt reports she has been having 1 nosebleed/day over past couple days, states they are easily stopped), Bruising (Pt states she may be bruising slightly  more than normal), Other complaints (Pt c/o high stress level d/t caring for friend who recently had surgery & is non-compliant. Pt also reports she is more fatigued than normal and is sleeping more than her norm.), Unexplained INR or factor level change    Comments Pt denies med changes, diarrhea, diet changes, or alcohol use, no other bleeding reported.           OBJECTIVE    INR   Date Value Ref Range Status   09/12/2017 3.92 (H) 0.86 - 1.14 Final     Comment:     Special tube used to correct for high hematocrit       ASSESSMENT / PLAN  No question data found.  Anticoagulation Summary as of 9/12/2017     INR goal 2.0-3.0   Today's INR    Maintenance plan 10 mg (5 mg x 2) on Mon, Wed, Fri; 15 mg (5 mg x 3) all other days   Full instructions 9/12: 5 mg; Otherwise 10 mg on Mon, Wed, Fri; 15 mg all other days   Weekly total 90 mg   Plan last modified Mireille Torres RN (5/3/2017)   Next INR check 9/22/2017   Priority INR   Target end date Indefinite    Indications   Long term current use of anticoagulant therapy [Z79.01]  DVT (deep venous thrombosis) (H) [I82.409]  PVD (peripheral vascular disease) with claudication (H) [I73.9]         Anticoagulation Episode Summary     INR check location     Preferred lab     Send INR reminders to [place] ANTICOAG POOL    Comments * lab draw due to elevated hematocrit (fingerstick meters don't work). LEFT LE. STENT x 3 LEFT UPPER LEG. Previous arterial clot. 2/27/17 CURRENT PHONE # [phone]      Anticoagulation Care Providers     Provider Role Specialty Phone number    Kd Leong MD Responsible Family Practice  [phone]            See the Encounter Report to view Anticoagulation Flowsheet and Dosing Calendar (Go to Encounters tab in chart review, and find the Anticoagulation Therapy Visit)        Melisa Murphy RN

## 2017-09-13 DIAGNOSIS — I73.9 PVD (PERIPHERAL VASCULAR DISEASE) WITH CLAUDICATION (H): ICD-10-CM

## 2017-09-13 RX ORDER — FUROSEMIDE 20 MG
TABLET ORAL
Qty: 180 TABLET | Refills: 1 | Status: SHIPPED | OUTPATIENT
Start: 2017-09-13 | End: 2018-03-07

## 2017-09-13 RX ORDER — GABAPENTIN 600 MG/1
TABLET ORAL
Qty: 90 TABLET | Refills: 2 | Status: SHIPPED | OUTPATIENT
Start: 2017-09-13 | End: 2017-11-09

## 2017-09-13 NOTE — TELEPHONE ENCOUNTER
Routing refill request to provider for review/approval because:  Drug not on the [facility] refill protocol   Mundo Conde RN

## 2017-09-13 NOTE — TELEPHONE ENCOUNTER
Prescription approved per [facility] Refill Protocol.  Mundo Conde RN
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
## 2017-09-22 ENCOUNTER — ONCOLOGY VISIT (OUTPATIENT)
Dept: ONCOLOGY | Facility: CLINIC | Age: 52
End: 2017-09-22
Attending: INTERNAL MEDICINE
Payer: COMMERCIAL

## 2017-09-22 ENCOUNTER — ANTICOAGULATION THERAPY VISIT (OUTPATIENT)
Dept: ANTICOAGULATION | Facility: CLINIC | Age: 52
End: 2017-09-22
Payer: COMMERCIAL

## 2017-09-22 ENCOUNTER — TELEPHONE (OUTPATIENT)
Dept: FAMILY MEDICINE | Facility: CLINIC | Age: 52
End: 2017-09-22

## 2017-09-22 VITALS
BODY MASS INDEX: 45.4 KG/M2 | HEART RATE: 75 BPM | DIASTOLIC BLOOD PRESSURE: 66 MMHG | SYSTOLIC BLOOD PRESSURE: 133 MMHG | TEMPERATURE: 97.8 F | HEIGHT: 59 IN | OXYGEN SATURATION: 94 % | WEIGHT: 225.2 LBS | RESPIRATION RATE: 16 BRPM

## 2017-09-22 DIAGNOSIS — Z79.01 LONG TERM CURRENT USE OF ANTICOAGULANT THERAPY: ICD-10-CM

## 2017-09-22 DIAGNOSIS — I73.9 PVD (PERIPHERAL VASCULAR DISEASE) WITH CLAUDICATION (H): ICD-10-CM

## 2017-09-22 DIAGNOSIS — I73.9 PERIPHERAL VASCULAR DISEASE WITH CLAUDICATION (H): ICD-10-CM

## 2017-09-22 DIAGNOSIS — J44.9 CHRONIC OBSTRUCTIVE PULMONARY DISEASE, UNSPECIFIED COPD TYPE (H): Primary | ICD-10-CM

## 2017-09-22 DIAGNOSIS — D75.1 ERYTHROCYTOSIS: ICD-10-CM

## 2017-09-22 DIAGNOSIS — I82.409 DVT (DEEP VENOUS THROMBOSIS) (H): ICD-10-CM

## 2017-09-22 LAB
BASOPHILS # BLD AUTO: 0.1 10E9/L (ref 0–0.2)
BASOPHILS NFR BLD AUTO: 1 %
DIFFERENTIAL METHOD BLD: ABNORMAL
EOSINOPHIL # BLD AUTO: 0.1 10E9/L (ref 0–0.7)
EOSINOPHIL NFR BLD AUTO: 1.4 %
ERYTHROCYTE [DISTWIDTH] IN BLOOD BY AUTOMATED COUNT: 21.8 % (ref 10–15)
HCT VFR BLD AUTO: 59.2 % (ref 35–47)
HGB BLD-MCNC: 18.9 G/DL (ref 11.7–15.7)
INR PPP: 5.07 (ref 0.86–1.14)
LYMPHOCYTES # BLD AUTO: 1.3 10E9/L (ref 0.8–5.3)
LYMPHOCYTES NFR BLD AUTO: 27.1 %
MCH RBC QN AUTO: 28.5 PG (ref 26.5–33)
MCHC RBC AUTO-ENTMCNC: 31.9 G/DL (ref 31.5–36.5)
MCV RBC AUTO: 89 FL (ref 78–100)
MONOCYTES # BLD AUTO: 0.4 10E9/L (ref 0–1.3)
MONOCYTES NFR BLD AUTO: 7.3 %
NEUTROPHILS # BLD AUTO: 3.1 10E9/L (ref 1.6–8.3)
NEUTROPHILS NFR BLD AUTO: 63.2 %
PLATELET # BLD AUTO: 106 10E9/L (ref 150–450)
RBC # BLD AUTO: 6.62 10E12/L (ref 3.8–5.2)
WBC # BLD AUTO: 4.9 10E9/L (ref 4–11)

## 2017-09-22 PROCEDURE — 99207 ZZC NO CHARGE NURSE ONLY: CPT | Performed by: REGISTERED NURSE

## 2017-09-22 PROCEDURE — 99211 OFF/OP EST MAY X REQ PHY/QHP: CPT

## 2017-09-22 PROCEDURE — 85025 COMPLETE CBC W/AUTO DIFF WBC: CPT | Performed by: FAMILY MEDICINE

## 2017-09-22 PROCEDURE — 99213 OFFICE O/P EST LOW 20 MIN: CPT | Performed by: INTERNAL MEDICINE

## 2017-09-22 PROCEDURE — 85610 PROTHROMBIN TIME: CPT | Performed by: FAMILY MEDICINE

## 2017-09-22 PROCEDURE — 36415 COLL VENOUS BLD VENIPUNCTURE: CPT | Performed by: FAMILY MEDICINE

## 2017-09-22 ASSESSMENT — PAIN SCALES - GENERAL: PAINLEVEL: EXTREME PAIN (8)

## 2017-09-22 NOTE — TELEPHONE ENCOUNTER
Patient called back and was notified that the INR was elevated. Patient denies any active bleeding, reports a lot of bruising. Patient states has already been instructed by anticoagulation clinic today on instructions for holding the Coumadin. Patient reports that she was told to hold the Coumadin today and tomorrow and take Coumadin 10 mg on Sunday and Monday, then recheck the INR on Tuesday. Told the patient if she were to develop bleeding issues, such as vaginal, rectal, urine and accompanied by abdominal pain to seek the ER immediately. Patient verbalizes understanding. Have reviewed anticoagulation note and confirms what patient told this nurse today. Will route to Anticoag clinic to update on recent information and left message for anticoag to call back to follow up.   TAWANA Dawson

## 2017-09-22 NOTE — TELEPHONE ENCOUNTER
Please call patient - tell her that she has high INR. I called hematologist as she just finished appointment with him, does not appear per note that she is having too many issues however was not addressed specifically.  Please ask if she is having any bleeding issues right now - for example nose or gum bleeds, vaginal, rectal, urinary bleeds, abdominal pain.  After this assuming she is not actively bleeding, please call INR clinic for guidance on holding/dosing coumadin.  Ayanna Hoff PA-C

## 2017-09-22 NOTE — PROGRESS NOTES
Hematology/ Oncology Follow-up Visit:  Sep 22, 2017    Reason for Visit:   Chief Complaint   Patient presents with     Hematology     6 month follow up erythrocytosis/anemia. Review lab results.        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 She was previously seen by Dr. Lomax on 10/09/2011, but has been lost from followup. The patient has had erythrocytosis with a hemoglobin around 20-23 and hemoglobin and hematocrit of 60-65% for most of her life. She had a previous workup by Dr. Lomax and also by another hematologist at [facility]. Review of her previous tests and workup showed a hemoglobin electrophoresis on 01/17/2007 that showed a hemoglobin A1 of 52%, A2 at 0, hemoglobin F at 1.4% and other hemoglobin 46.2%. JAK2 was negative on 07/31/2007. Hemoglobin affinity testing on 07/31/2007 showed a P50 of 27 (normal between 25 and 29). Repeat hemoglobin electrophoresis on 10/04/2011 showed the hemoglobin was at 39.9%. This illuminated hemoglobin was suggestive of beta globin variant of hemoglobin Hb Gibson. She was also seen by Dr. Peres in 2007 and most of the workup is from there. Erythropoietin level was in the range of the 80s. Vitamin B12 and folic acid were within normal limits. Abdomen ultrasound did not show hepatosplenomegaly, but there is fatty infiltration of the liver. According to the chart, the patient had phlebotomies done in the past to keep her hemoglobin below 20 g/dl. This was done over the summertime of 2011. Most recently she didn't have any phlebotomy. She was started on anticoagulation with Coumadin as she was felt to be at high risk for blood clots and given history of severe  arterial disease and stroke in the past. The patient is safe to use her anticoagulation and her most recent INR was 1.74. Evidently her carbon monoxide is also elevated. She is an active smoker, but she is cutting down from 2 packs per day to 3 cigarettes a day over the last one month. Patient denies any DVT or PE. She also evidently claims that her mother and her first son are also having the same hemoglobinopathy and are being followed by their physicians. Bone marrow biopsy on 11/17/2014 showed no clonal chromosomal abnormality and no cytogenetic evidence of a malignant process. She also tested negative for the JAK2 mutation. Leukemia lymphoma evaluation additionally showed no aberrant immunophenotype on T cells and no increase in or abnormal appearing myeloid blasts.       Interval History:  The patient is here today for follow-up. She has been doing well without any recent complaints. She denies any shortness of breath or cough or wheezing. She denies any chest pain. She denies any weight loss or fever or chills. Unfortunately she continues to smoke at least half a pack of cigarettes a day.    Review Of Systems:  Constitutional: Negative for fever, chills, and night sweats.  Skin: negative.  Eyes: negative.  Ears/Nose/Throat: negative.  Respiratory: No shortness of breath, dyspnea on exertion, cough, or hemoptysis.  Cardiovascular: negative.  Gastrointestinal: negative.  Genitourinary: negative.  Musculoskeletal: negative.  Neurologic: negative.  Psychiatric: negative.  Hematologic/Lymphatic/Immunologic: negative.  Endocrine: negative.    All other ROS negative unless mentioned in interval history.    Past medical, social, surgical, and family histories reviewed.    Allergies:  Allergies as of 09/22/2017 - Rylan as Reviewed 09/22/2017   Allergen Reaction Noted     Darvocet [propoxyphene n-apap] Anaphylaxis 07/26/2006     Percocet [codeine] Anaphylaxis 01/16/2007     Percocet [oxycodone-acetaminophen] Swelling,  Anaphylaxis, and Hives 06/01/2016     Asa [aspirin] Hives 07/10/2006     Aspirin Hives 01/25/2017     Bee  03/26/2010     Lyrica [pregabalin] Swelling and Unknown 12/04/2015     Iodine Rash 08/03/2006     Povidone iodine Rash 01/25/2017     Tape [adhesive tape] Rash 08/03/2006       Current Medications:  Current Outpatient Prescriptions   Medication Sig Dispense Refill     furosemide (LASIX) 20 MG tablet TAKE ONE TABLET BY MOUTH TWICE DAILY 180 tablet 1     gabapentin (NEURONTIN) 600 MG tablet TAKE 1 TABLET (600 MG) BY MOUTH 3 TIMES DAILY 90 tablet 2     warfarin (COUMADIN) 5 MG tablet Take 10mg by mouth MWF and 15mg all other days or As directed by Anticoagulation Clinic 72 tablet 2     buPROPion (WELLBUTRIN XL) 150 MG 24 hr tablet TAKE  ONE TABLET BY MOUTH EVERY DAY IN THE MORNING 30 tablet 1     traZODone (DESYREL) 150 MG tablet Take 1 tablet (150 mg) by mouth At Bedtime 90 tablet 1     calcium citrate-vitamin D (CITRACAL) 315-200 MG-UNIT TABS per tablet Take two tablet at dinner and one tablet at bedtime.       lamoTRIgine (LAMICTAL) 200 MG tablet Take 1 tablet (200 mg) by mouth daily 90 tablet 3     ARIPiprazole (ABILIFY) 5 MG tablet Take 1.5 tablets (7.5 mg) by mouth At Bedtime 180 tablet 3     mometasone-formoterol (DULERA) 100-5 MCG/ACT oral inhaler Inhale 2 puffs into the lungs 2 times daily 13 g 11     gabapentin (NEURONTIN) 300 MG capsule Take 1 capsule (300 mg) by mouth At Bedtime Take with 600mg at bedtime. For total dose of 600mg in am 600 at noon and 900mg at bedtime 90 capsule 3     simvastatin (ZOCOR) 20 MG tablet TAKE ONE TABLET BY MOUTH AT BEDTIME 90 tablet 3     omeprazole (PRILOSEC) 20 MG CR capsule TAKE 1 CAPSULE (20 MG) BY MOUTH 2 TIMES DAILY 180 capsule 1     levothyroxine (SYNTHROID/LEVOTHROID) 150 MCG tablet Take 1 tablet (150 mcg) by mouth daily 90 tablet 1     order for DME Equipment being ordered: CPAP  AIRSENSE 10  5-18 CM H20  SN# [id number]   DN# [id number]       order for DME Equipment  "being ordered: [id number]\" x2pair     \"20-30mmHg [place] Hybrid Liners\" x 2 pair 2 Units 11     albuterol (PROAIR HFA, PROVENTIL HFA, VENTOLIN HFA) 108 (90 BASE) MCG/ACT inhaler Inhale 2 puffs into the lungs every 6 hours as needed for shortness of breath / dyspnea or wheezing 3 Inhaler 1     order for DME Equipment being ordered: DEPENDS SIZE LARGE 60 Month 5     order for DME Equipment being ordered:     \"[id number]\" x2pair    \"20-30mmHg [place] Hybrid Liners\" x1pair 2 Device 11     order for DME Equipment being ordered: INCONTINENCE PADS   QID 1 Month 11     order for DME Equipment being ordered: CPAP supplies needed 3 Month 3     albuterol (2.5 MG/3ML) 0.083% nebulizer solution Take 1 vial (2.5 mg) by nebulization every 6 hours as needed for shortness of breath / dyspnea or wheezing 1 Box 0     ORDER FOR DME Equipment being ordered: CPAP 1 Units 11     EPINEPHrine 0.3 MG/0.3ML injection Inject 0.3 mLs (0.3 mg) into the muscle once as needed for anaphylaxis (Patient not taking: Reported on 9/22/2017) 0.6 mL 0     allopurinol (ZYLOPRIM) 100 MG tablet Take 1 tablet (100 mg) by mouth daily (Patient not taking: Reported on 9/22/2017) 90 tablet 2     acetaminophen (TYLENOL) 500 MG tablet Take 500-1,000 mg by mouth every 6 hours as needed for mild pain       ASPIRIN NOT PRESCRIBED, INTENTIONAL, by Other route continuous prn Reported on 3/24/2017  0        Physical Exam:  /66 (BP Location: Right arm, Patient Position: Sitting, Cuff Size: Adult Large)  Pulse 75  Temp 97.8  F (36.6  C) (Tympanic)  Resp 16  Ht 1.492 m (4' 10.75\")  Wt 102.2 kg (225 lb 3.2 oz)  LMP 09/27/2009  SpO2 94%  Breastfeeding? No  BMI 45.87 kg/m2  Wt Readings from Last 12 Encounters:   09/22/17 102.2 kg (225 lb 3.2 oz)   07/10/17 104.2 kg (229 lb 11.2 oz)   05/30/17 104.4 kg (230 lb 3.2 oz)   05/01/17 105 kg (231 lb 8 oz)   04/27/17 103 kg (227 lb)   03/29/17 103 kg (227 lb)   03/24/17 106.3 kg (234 lb 4.8 oz)   03/20/17 106 kg " (233 lb 9.6 oz)   03/16/17 104.1 kg (229 lb 8 oz)   03/10/17 102.8 kg (226 lb 9.6 oz)   02/22/17 101.3 kg (223 lb 6.4 oz)   02/06/17 102.3 kg (225 lb 9.6 oz)     GENERAL APPEARANCE: Healthy, alert and in no acute distress.  HEENT: Sclerae anicteric. PERRLA. Oropharynx without ulcers, lesions, or thrush.  NECK: Supple. No asymmetry or masses.  LYMPHATICS: No palpable cervical, supraclavicular, axillary, or inguinal lymphadenopathy.  RESP: Lungs clear to auscultation bilaterally without rales, rhonchi or wheezes.  CARDIOVASCULAR: Regular rate and rhythm. Normal S1, S2; no S3 or S4. No murmur, gallop, or rub.  ABDOMEN: Soft, nontender. Bowel sounds normal. No palpable organomegaly or masses.  MUSCULOSKELETAL: Extremities without gross deformities noted. No edema of bilateral lower extremities.  SKIN: No suspicious lesions or rashes.  NEURO: Alert and oriented x 3. Cranial nerves II-XII grossly intact.  PSYCHIATRIC: Mentation and affect appear normal.    Laboratory/Imaging Studies:  Orders Only on 09/22/2017   Component Date Value Ref Range Status     INR 09/22/2017 5.07* 0.86 - 1.14 Final    Comment: Special tube used to correct for high hematocrit  Critical Value called to and read back by  SUHAS ALMANZAR RN. 9/22/17 AT 1322 AS       WBC 09/22/2017 4.9  4.0 - 11.0 10e9/L Final     RBC Count 09/22/2017 6.62* 3.8 - 5.2 10e12/L Final     Hemoglobin 09/22/2017 18.9* 11.7 - 15.7 g/dL Final     Hematocrit 09/22/2017 59.2* 35.0 - 47.0 % Final     MCV 09/22/2017 89  78 - 100 fl Final     MCH 09/22/2017 28.5  26.5 - 33.0 pg Final     MCHC 09/22/2017 31.9  31.5 - 36.5 g/dL Final     RDW 09/22/2017 21.8* 10.0 - 15.0 % Final     Platelet Count 09/22/2017 106* 150 - 450 10e9/L Final     Diff Method 09/22/2017 Automated Method   Final     % Neutrophils 09/22/2017 63.2  % Final     % Lymphocytes 09/22/2017 27.1  % Final     % Monocytes 09/22/2017 7.3  % Final     % Eosinophils 09/22/2017 1.4  % Final     % Basophils 09/22/2017  1.0  % Final     Absolute Neutrophil 09/22/2017 3.1  1.6 - 8.3 10e9/L Final     Absolute Lymphocytes 09/22/2017 1.3  0.8 - 5.3 10e9/L Final     Absolute Monocytes 09/22/2017 0.4  0.0 - 1.3 10e9/L Final     Absolute Eosinophils 09/22/2017 0.1  0.0 - 0.7 10e9/L Final     Absolute Basophils 09/22/2017 0.1  0.0 - 0.2 10e9/L Final   Orders Only on 09/12/2017   Component Date Value Ref Range Status     INR 09/12/2017 3.92* 0.86 - 1.14 Final    Special tube used to correct for high hematocrit     WBC 09/12/2017 6.3  4.0 - 11.0 10e9/L Final     RBC Count 09/12/2017 6.95* 3.8 - 5.2 10e12/L Final     Hemoglobin 09/12/2017 19.6* 11.7 - 15.7 g/dL Final     Hematocrit 09/12/2017 61.4* 35.0 - 47.0 % Final     MCV 09/12/2017 87  78 - 100 fl Final     MCH 09/12/2017 28.2  26.5 - 33.0 pg Final     MCHC 09/12/2017 32.4  31.5 - 36.5 g/dL Final     RDW 09/12/2017 21.5* 10.0 - 15.0 % Final     Platelet Count 09/12/2017 112* 150 - 450 10e9/L Final     Diff Method 09/12/2017 Automated Method   Final     % Neutrophils 09/12/2017 66.8  % Final     % Lymphocytes 09/12/2017 24.0  % Final     % Monocytes 09/12/2017 7.6  % Final     % Eosinophils 09/12/2017 1.1  % Final     % Basophils 09/12/2017 0.3  % Final     % Immature Granulocytes 09/12/2017 0.2  % Final     Absolute Neutrophil 09/12/2017 4.2  1.6 - 8.3 10e9/L Final     Absolute Lymphocytes 09/12/2017 1.5  0.8 - 5.3 10e9/L Final     Absolute Monocytes 09/12/2017 0.5  0.0 - 1.3 10e9/L Final     Absolute Eosinophils 09/12/2017 0.1  0.0 - 0.7 10e9/L Final     Absolute Basophils 09/12/2017 0.0  0.0 - 0.2 10e9/L Final     Abs Immature Granulocytes 09/12/2017 0.0  0 - 0.4 10e9/L Final          Assessment and plan:    (D75.1) Erythrocytosis  There is no indication at this time for phlebotomy. We will continue to monitor the patient's hematocrit. I will see the patient again in 6 months or sooner if there are new developments or concerns.    (J44.9) Chronic obstructive pulmonary disease,  unspecified COPD type (H)    I strongly advised the patient to quit smoking.      The patient is ready to learn, no apparent learning barriers were identified.  Diagnosis and treatment plans were explained to the patient. The patient expressed understanding of the content. The patient asked appropriate questions. The patient questions were answered to her satisfaction.    Chart documentation with Dragon Voice recognition Software. Although reviewed after completion, some words and grammatical errors may remain.

## 2017-09-22 NOTE — MR AVS SNAPSHOT
After Visit Summary   9/22/2017    Bia Bill    MRN: [id number]           Patient Information     Date Of Birth          1965        Visit Information        Provider Department      9/22/2017 1:00 PM Joshua Watts MD [facility] ONCOLOGY      Today's Diagnoses     Chronic obstructive pulmonary disease, unspecified COPD type (H)    -  1    Erythrocytosis          Care Instructions    We would like to see you back in clinic with Dr. Watts in 6 months with standing labs. Copy of appointments, and after visit summary (AVS) given to patient.  If you have any questions during business hours (M-F 8 AM- 4PM), please call Deepthi Condon, RN, BSN, OCN Oncology Hematology /Breast Cancer Navigator at [facility] [phone].   For questions after business hours, or on holidays/weekends, please call our after hours Nurse Triage line [phone]. Thank you.            Follow-ups after your visit        Follow-up notes from your care team     Return in about 6 months (around 3/22/2018) for Standing blood work.      Your next 10 appointments already scheduled     Sep 26, 2017 10:30 AM CDT   Return Visit with [facility] ([facility])    [address] [address]   [phone]            Oct 10, 2017 10:30 AM CDT   Return Visit with [facility] ([facility])    [address] [address]   [phone]            Oct 13, 2017  1:30 PM CDT   LAB with [facility] ([facility])    [address] [address]   [phone]           Patient must bring picture ID. Patient should be prepared to give a urine specimen  Please do not eat 10-12 hours before your appointment if you are coming in fasting for labs on lipids, cholesterol, or glucose (sugar). Pregnant women should  follow their Care Team instructions. Water with medications is okay. Do not drink coffee or other fluids. If you have concerns about taking  your medications, please ask at office or if scheduling via Mosaic, send a message by clicking on Secure Messaging, Message Your Care Team.            Nov 17, 2017  1:30 PM CST   LAB with [facility] ([facility])    [address] [address]   [phone]           Patient must bring picture ID. Patient should be prepared to give a urine specimen  Please do not eat 10-12 hours before your appointment if you are coming in fasting for labs on lipids, cholesterol, or glucose (sugar). Pregnant women should follow their Care Team instructions. Water with medications is okay. Do not drink coffee or other fluids. If you have concerns about taking  your medications, please ask at office or if scheduling via Mosaic, send a message by clicking on Secure Messaging, Message Your Care Team.            Dec 15, 2017  1:30 PM CST   LAB with [facility] ([facility])    [address] [address]   [phone]           Patient must bring picture ID. Patient should be prepared to give a urine specimen  Please do not eat 10-12 hours before your appointment if you are coming in fasting for labs on lipids, cholesterol, or glucose (sugar). Pregnant women should follow their Care Team instructions. Water with medications is okay. Do not drink coffee or other fluids. If you have concerns about taking  your medications, please ask at office or if scheduling via Mosaic, send a message by clicking on Secure Messaging, Message Your Care Team.            Dec 15, 2017  1:35 PM CST   LAB with [facility] ([facility])    [address] [address]   [phone]           Patient must bring picture ID. Patient should be prepared to give a  urine specimen  Please do not eat 10-12 hours before your appointment if you are coming in fasting for labs on lipids, cholesterol, or glucose (sugar). Pregnant women should follow their Care Team instructions. Water with medications is okay. Do not drink coffee or other fluids. If you have concerns about taking  your medications, please ask at office or if scheduling via [employer], send a message by clicking on Secure Messaging, Message Your Care Team.            Jan 12, 2018  1:30 PM CST   LAB with [facility] ([facility])    [address] [address]   [phone]           Patient must bring picture ID. Patient should be prepared to give a urine specimen  Please do not eat 10-12 hours before your appointment if you are coming in fasting for labs on lipids, cholesterol, or glucose (sugar). Pregnant women should follow their Care Team instructions. Water with medications is okay. Do not drink coffee or other fluids. If you have concerns about taking  your medications, please ask at office or if scheduling via [employer]Pro, send a message by clicking on Secure Messaging, Message Your Care Team.            Feb 09, 2018  1:30 PM CST   LAB with [facility] ([facility])    [address] [address]   [phone]           Patient must bring picture ID. Patient should be prepared to give a urine specimen  Please do not eat 10-12 hours before your appointment if you are coming in fasting for labs on lipids, cholesterol, or glucose (sugar). Pregnant women should follow their Care Team instructions. Water with medications is okay. Do not drink coffee or other fluids. If you have concerns about taking  your medications, please ask at office or if scheduling via MedPro, send a message by clicking on Secure Messaging, Message Your Care Team.            Mar 16, 2018  1:30 PM CDT   LAB with [facility]  [place] ([facility])    [address] [address]   [phone]           Patient must bring picture ID. Patient should be prepared to give a urine specimen  Please do not eat 10-12 hours before your appointment if you are coming in fasting for labs on lipids, cholesterol, or glucose (sugar). Pregnant women should follow their Care Team instructions. Water with medications is okay. Do not drink coffee or other fluids. If you have concerns about taking  your medications, please ask at office or if scheduling via [employer], send a message by clicking on Secure Messaging, Message Your Care Team.            Mar 16, 2018  1:35 PM CDT   [facility] with [facility]    [address] [address]   [phone]           Patient must bring picture ID. Patient should be prepared to give a urine specimen  Please do not eat 10-12 hours before your appointment if you are coming in fasting for labs on lipids, cholesterol, or glucose (sugar). Pregnant women should follow their Care Team instructions. Water with medications is okay. Do not drink coffee or other fluids. If you have concerns about taking  your medications, please ask at office or if scheduling via [employer], send a message by clicking on Secure Messaging, Message Your Care Team.              Future tests that were ordered for you today     Open Standing Orders        Priority Remaining Interval Expires Ordered    CBC with platelets differential Routine 4/4 3 month 9/22/2018 9/22/2017            Who to contact     If you have questions or need follow up information about today's clinic visit or your schedule please contact [facility] directly at [phone].  Normal or non-critical lab and imaging results will be communicated to you by MyChart, letter or phone within 4 business days after the clinic has received the results. If you do not hear from us within 7 days,  "please contact the clinic through [employer] or phone. If you have a critical or abnormal lab result, we will notify you by phone as soon as possible.  Submit refill requests through [employer] or call your pharmacy and they will forward the refill request to us. Please allow 3 business days for your refill to be completed.          Additional Information About Your Visit        [id number] Information     PrivateGriffe lets you send messages to your doctor, view your test results, renew your prescriptions, schedule appointments and more. To sign up, go to [email or web address] . Click on \"Log in\" on the left side of the screen, which will take you to the Welcome page. Then click on \"Sign up Now\" on the right side of the page.     You will be asked to enter the access code listed below, as well as some personal information. Please follow the directions to create your username and password.     Your access code is: [email or web address]  Expires: 10/8/2017  1:43 PM     Your access code will  in 90 days. If you need help or a new code, please call your [facility] clinic or [phone].        Care EveryWhere ID     This is your Care EveryWhere ID. This could be used by other organizations to access your [facility] medical records  [id number]        Your Vitals Were     Pulse Temperature Respirations Height Last Period Pulse Oximetry    75 97.8  F (36.6  C) (Tympanic) 16 1.492 m (4' 10.75\") 2009 94%    Breastfeeding? BMI (Body Mass Index)                No 45.87 kg/m2           Blood Pressure from Last 3 Encounters:   17 133/66   17 122/66   07/10/17 130/76    Weight from Last 3 Encounters:   17 102.2 kg (225 lb 3.2 oz)   07/10/17 104.2 kg (229 lb 11.2 oz)   17 104.4 kg (230 lb 3.2 oz)               Primary Care Provider Office Phone # Fax #    Kd Leong [phone]       [facility] [address] [address] [address]        Equal Access to Services     ANTIONETTE IRBY AH: " Hadii aad noah hernandezharpreeto Sokateyali, waaxda luqadaha, qaybta kaalmada obed, nitin craig. So [facility] [phone].    ATENCIÓN: Si rashid magana, tiene a terry disposición servicios gratuitos de asistencia lingüística. Llame al [phone].    We comply with applicable federal civil rights laws and [place] laws. We do not discriminate on the basis of race, color, national origin, age, disability sex, sexual orientation or gender identity.            Thank you!     Thank you for choosing [facility]  for your care. Our goal is always to provide you with excellent care. Hearing back from our patients is one way we can continue to improve our services. Please take a few minutes to complete the written survey that you may receive in the mail after your visit with us. Thank you!             Your Updated Medication List - Protect others around you: Learn how to safely use, store and throw away your medicines at www.disposemymeds.org.          This list is accurate as of: 9/22/17  1:19 PM.  Always use your most recent med list.                   Brand Name Dispense Instructions for use Diagnosis    acetaminophen 500 MG tablet    TYLENOL     Take 500-1,000 mg by mouth every 6 hours as needed for mild pain        * albuterol (2.5 MG/3ML) 0.083% neb solution     1 Box    Take 1 vial (2.5 mg) by nebulization every 6 hours as needed for shortness of breath / dyspnea or wheezing    Cough       * albuterol 108 (90 BASE) MCG/ACT Inhaler    PROAIR HFA/PROVENTIL HFA/VENTOLIN HFA    3 Inhaler    Inhale 2 puffs into the lungs every 6 hours as needed for shortness of breath / dyspnea or wheezing    Acute bronchospasm       allopurinol 100 MG tablet    ZYLOPRIM    90 tablet    Take 1 tablet (100 mg) by mouth daily    Acute gouty arthritis       ARIPiprazole 5 MG tablet    ABILIFY    180 tablet    Take 1.5 tablets (7.5 mg) by mouth At Bedtime    Major depressive disorder, recurrent episode, moderate (H)        * ASPIRIN NOT PRESCRIBED    INTENTIONAL     by Other route continuous prn Reported on 3/24/2017        buPROPion 150 MG 24 hr tablet    WELLBUTRIN XL    30 tablet    TAKE  ONE TABLET BY MOUTH EVERY DAY IN THE MORNING    COPD exacerbation (H)       calcium citrate-vitamin D 315-200 MG-UNIT Tabs per tablet    CITRACAL     Take two tablet at dinner and one tablet at bedtime.        EPINEPHrine 0.3 MG/0.3ML injection 2-pack    EPIPEN/ADRENACLICK/or ANY BX GENERIC EQUIV    0.6 mL    Inject 0.3 mLs (0.3 mg) into the muscle once as needed for anaphylaxis    Anaphylactic reaction to bee sting, undetermined intent, subsequent encounter       furosemide 20 MG tablet    LASIX    180 tablet    TAKE ONE TABLET BY MOUTH TWICE DAILY    Lymphedema of both lower extremities       * gabapentin 300 MG capsule    NEURONTIN    90 capsule    Take 1 capsule (300 mg) by mouth At Bedtime Take with 600mg at bedtime. For total dose of 600mg in am 600 at noon and 900mg at bedtime    Major depressive disorder, recurrent episode, moderate (H), Restless leg syndrome       * gabapentin 600 MG tablet    NEURONTIN    90 tablet    TAKE 1 TABLET (600 MG) BY MOUTH 3 TIMES DAILY    PVD (peripheral vascular disease) with claudication (H)       lamoTRIgine 200 MG tablet    LaMICtal    90 tablet    Take 1 tablet (200 mg) by mouth daily    Major depressive disorder, recurrent episode, moderate (H)       levothyroxine 150 MCG tablet    SYNTHROID/LEVOTHROID    90 tablet    Take 1 tablet (150 mcg) by mouth daily    Hypothyroidism, unspecified type       mometasone-formoterol 100-5 MCG/ACT oral inhaler    DULERA    13 g    Inhale 2 puffs into the lungs 2 times daily    Mild persistent asthma without complication       omeprazole 20 MG CR capsule    priLOSEC    180 capsule    TAKE 1 CAPSULE (20 MG) BY MOUTH 2 TIMES DAILY    Gastroesophageal reflux disease without esophagitis       * order for DME      Equipment being ordered: CPAP AIRSENSE 10 5-18 CM H20  "# [id number]   DN# [id number]        * order for DME     1 Units    Equipment being ordered: CPAP    GILES (obstructive sleep apnea)       * order for DME     3 Month    Equipment being ordered: CPAP supplies needed    GILES (obstructive sleep apnea)       * order for DME     1 Month    Equipment being ordered: INCONTINENCE PADS  QID    Other urinary incontinence       * order for DME     60 Month    Equipment being ordered: DEPENDS SIZE LARGE    Mixed incontinence       * order for DME     2 Device    Equipment being ordered:   \"[id number]\" x2pair  \"20-30mmHg Farrow Hybrid Liners\" x1pair    Lymphedema of both lower extremities       * order for DME     2 Units    Equipment being ordered: [id number]\" x2pair   \"20-30mmHg Farrow Hybrid Liners\" x 2 pair    Peripheral edema       simvastatin 20 MG tablet    ZOCOR    90 tablet    TAKE ONE TABLET BY MOUTH AT BEDTIME    Hypercholesteremia       traZODone 150 MG tablet    DESYREL    90 tablet    Take 1 tablet (150 mg) by mouth At Bedtime    Major depressive disorder, recurrent episode, moderate (H)       warfarin 5 MG tablet    COUMADIN    72 tablet    Take 10mg by mouth MWF and 15mg all other days or As directed by Anticoagulation Clinic    DVT (deep venous thrombosis) (H), Long term current use of anticoagulant therapy       * Notice:  This list has 12 medication(s) that are the same as other medications prescribed for you. Read the directions carefully, and ask your doctor or other care provider to review them with you.      "

## 2017-09-22 NOTE — PROGRESS NOTES
ANTICOAGULATION FOLLOW-UP CLINIC VISIT    Patient Name:  Bia Bill  Date:  9/22/2017  Contact Type:  voicemail left for pt with request to call [facility] back with any questions/concerns.    SUBJECTIVE:     Patient Findings     Positives Bruising (more frequent), Other complaints (fell last week), Inflammation (asthma flare up)    Comments Appt today with Dr. Watts.  Information gather from EPIC notes from pt's appt today.            OBJECTIVE    INR   Date Value Ref Range Status   09/22/2017 5.07 (HH) 0.86 - 1.14 Final     Comment:     Special tube used to correct for high hematocrit  Critical Value called to and read back by  SUHAS ALMANZAR RN. 9/22/17 AT 1322 AS         ASSESSMENT / PLAN  INR assessment SUPRA hold x2 doses   Recheck INR In: 4 DAYS    INR Location Clinic lab draw     Anticoagulation Summary as of 9/22/2017     INR goal 2.0-3.0   Today's INR 5.07!   Maintenance plan 10 mg (5 mg x 2) on Mon, Wed, Fri; 15 mg (5 mg x 3) all other days   Full instructions 9/22: Hold; 9/23: Hold; 9/24: 10 mg; Otherwise 10 mg on Mon, Wed, Fri; 15 mg all other days   Weekly total 90 mg   Plan last modified Mireille Torres, RN (5/3/2017)   Next INR check 9/26/2017   Target end date Indefinite    Indications   Long term current use of anticoagulant therapy [Z79.01]  DVT (deep venous thrombosis) (H) [I82.409]  PVD (peripheral vascular disease) with claudication (H) [I73.9]         Anticoagulation Episode Summary     INR check location     Preferred lab     Send INR reminders to [place] PHONE ANTICOAG POOL    Comments * lab draw due to elevated hematocrit (fingerstick meters don't work). LEFT LE. STENT x 3 LEFT UPPER LEG. Previous arterial clot.      Anticoagulation Care Providers     Provider Role Specialty Phone number    Kd Leong MD [facility] [phone]            See the Encounter Report to view Anticoagulation Flowsheet and Dosing Calendar (Go to Encounters tab in chart review, and find  the Anticoagulation Therapy Visit)      Routed to Dr. Leong as CALLY.   Brenna Rausch RN

## 2017-09-22 NOTE — NURSING NOTE
"Oncology Rooming Note    September 22, 2017 12:52 PM   Bia Bill is a 51 year old female who presents for:    Chief Complaint   Patient presents with     Hematology     6 month follow up erythrocytosis/anemia. Review lab results.      Initial Vitals: /66 (BP Location: Right arm, Patient Position: Sitting, Cuff Size: Adult Large)  Pulse 75  Temp 97.8  F (36.6  C) (Tympanic)  Resp 16  Ht 1.492 m (4' 10.75\")  Wt 102.2 kg (225 lb 3.2 oz)  LMP 09/27/2009  SpO2 94%  Breastfeeding? No  BMI 45.87 kg/m2 Estimated body mass index is 45.87 kg/(m^2) as calculated from the following:    Height as of this encounter: 1.492 m (4' 10.75\").    Weight as of this encounter: 102.2 kg (225 lb 3.2 oz). Body surface area is 2.06 meters squared.  Extreme Pain (8) Comment: right knee and right hip.    Patient's last menstrual period was 09/27/2009.  Allergies reviewed: Yes  Medications reviewed: Yes    Medications: Medication refills not needed today.  Pharmacy name entered into [employer]:    [place], [place] - [address] [facility] [id number] - [place], [place] - [address] [facility] [facility], [place] - [address] [place] [address]    Clinical concerns: 6 month follow up Erythrocytosis/Anemia. Review lab results. Recently fell last week 6/10 right knee and right hip pains. She has noticed she is bruising more frequently and her asthma has flared up over this past week.     7 minutes for nursing intake (face to face time)     Janice Gonzalez [facility]            "

## 2017-09-22 NOTE — TELEPHONE ENCOUNTER
[facility] calling today, spoke with Kalyn who is reporting a critical INR lab 5.07 today. Will route and speak to Ayanna Hoff who has seen the patient in the past as Dr. Leong is out of office today. TAWANA Dawson

## 2017-09-22 NOTE — PATIENT INSTRUCTIONS
We would like to see you back in clinic with Dr. Watts in 6 months with standing labs. Copy of appointments, and after visit summary (AVS) given to patient.  If you have any questions during business hours (M-F 8 AM- 4PM), please call Deepthi Condon RN, BSN, OCN Oncology Hematology /Breast Cancer Navigator at [facility] [phone].   For questions after business hours, or on holidays/weekends, please call our after hours Nurse Triage line [phone]. Thank you.

## 2017-09-22 NOTE — ASSESSMENT & PLAN NOTE
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 She was previously seen by Dr. Lomax on 10/09/2011, but has been lost from followup. The patient has had erythrocytosis with a hemoglobin around 20-23 and hemoglobin and hematocrit of 60-65% for most of her life. She had a previous workup by Dr. Lomax and also by another hematologist at [facility]. Review of her previous tests and workup showed a hemoglobin electrophoresis on 01/17/2007 that showed a hemoglobin A1 of 52%, A2 at 0, hemoglobin F at 1.4% and other hemoglobin 46.2%. JAK2 was negative on 07/31/2007. Hemoglobin affinity testing on 07/31/2007 showed a P50 of 27 (normal between 25 and 29). Repeat hemoglobin electrophoresis on 10/04/2011 showed the hemoglobin was at 39.9%. 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 She was also seen by Dr. Peres in 2007 and most of the workup is from there. Erythropoietin level was in the range of the 80s. Vitamin B12 and folic acid were within normal limits. Abdomen ultrasound did not show hepatosplenomegaly, but there is fatty infiltration of the liver. According to the chart, the patient had phlebotomies done in the past to keep her hemoglobin below 20 g/dl. This was done over the summertime of 2011. Most recently she didn't have any phlebotomy. She was started on anticoagulation with Coumadin as she was felt to be at high risk for blood clots and given history of severe arterial disease and stroke in the past. The patient is safe to use her anticoagulation and her most recent INR was 1.74. Evidently her carbon monoxide is also elevated. She is an active smoker, but she is cutting down from 2 packs per day to  3 cigarettes a day over the last one month. Patient denies any DVT or PE. She also evidently claims that her mother and her first son are also having the same hemoglobinopathy and are being followed by their physicians. Bone marrow biopsy on 11/17/2014 showed no clonal chromosomal abnormality and no cytogenetic evidence of a malignant process. She also tested negative for the JAK2 mutation. Leukemia lymphoma evaluation additionally showed no aberrant immunophenotype on T cells and no increase in or abnormal appearing myeloid blasts.

## 2017-09-22 NOTE — TELEPHONE ENCOUNTER
Anticoagulation responded and said it was taken care of and that encounter can be closed.  TAWANA Dawson

## 2017-09-22 NOTE — MR AVS SNAPSHOT
Bia S Fly   9/22/2017   Anticoagulation Therapy Visit    Description:  51 year old female   Provider:  Brenna Rausch, RN   [facility]           INR as of 9/22/2017     Today's INR 5.07!      Anticoagulation Summary as of 9/22/2017     INR goal 2.0-3.0   Today's INR 5.07!   Full instructions 9/22: Hold; 9/23: Hold; 9/24: 10 mg; Otherwise 10 mg on Mon, Wed, Fri; 15 mg all other days   Next INR check 9/26/2017    Indications   Long term current use of anticoagulant therapy [Z79.01]  DVT (deep venous thrombosis) (H) [I82.409]  PVD (peripheral vascular disease) with claudication (H) [I73.9]         Description     Hold x2 days. Increase greens.   10mg Sun & Mon.  Recheck INR Tuesday 9/26/17.      September 2017 Details    Sun Mon Tue Wed Thu Fri Sat          1               2                 3               4               5               6               7               8               9                 10               11               12               13               14               15               16                 17               18               19               20               21               22      Hold   See details      23      Hold           24      10 mg         25      10 mg         26            27               28               29               30                Date Details   09/22 This INR check       Date of next INR:  9/26/2017         How to take your warfarin dose     To take:  10 mg Take 2 of the 5 mg tablets.    To take:  15 mg Take 3 of the 5 mg tablets.    Hold Do not take your warfarin dose. See the Details table to the right for additional instructions.

## 2017-09-26 ENCOUNTER — OFFICE VISIT (OUTPATIENT)
Dept: PSYCHOLOGY | Facility: CLINIC | Age: 52
End: 2017-09-26
Payer: COMMERCIAL

## 2017-09-26 ENCOUNTER — ANTICOAGULATION THERAPY VISIT (OUTPATIENT)
Dept: ANTICOAGULATION | Facility: CLINIC | Age: 52
End: 2017-09-26
Payer: COMMERCIAL

## 2017-09-26 DIAGNOSIS — I73.9 PERIPHERAL VASCULAR DISEASE WITH CLAUDICATION (H): ICD-10-CM

## 2017-09-26 DIAGNOSIS — I73.9 PVD (PERIPHERAL VASCULAR DISEASE) WITH CLAUDICATION (H): ICD-10-CM

## 2017-09-26 DIAGNOSIS — I82.409 DVT (DEEP VENOUS THROMBOSIS) (H): ICD-10-CM

## 2017-09-26 DIAGNOSIS — F31.62 MODERATE MIXED BIPOLAR I DISORDER (H): Primary | ICD-10-CM

## 2017-09-26 DIAGNOSIS — D75.1 ERYTHROCYTOSIS: ICD-10-CM

## 2017-09-26 DIAGNOSIS — Z79.01 LONG TERM CURRENT USE OF ANTICOAGULANT THERAPY: ICD-10-CM

## 2017-09-26 LAB
BASOPHILS # BLD AUTO: 0 10E9/L (ref 0–0.2)
BASOPHILS NFR BLD AUTO: 0.4 %
DIFFERENTIAL METHOD BLD: ABNORMAL
EOSINOPHIL # BLD AUTO: 0 10E9/L (ref 0–0.7)
EOSINOPHIL NFR BLD AUTO: 0.8 %
ERYTHROCYTE [DISTWIDTH] IN BLOOD BY AUTOMATED COUNT: 21.5 % (ref 10–15)
HCT VFR BLD AUTO: 59.9 % (ref 35–47)
HGB BLD-MCNC: 19.5 G/DL (ref 11.7–15.7)
IMM GRANULOCYTES # BLD: 0 10E9/L (ref 0–0.4)
IMM GRANULOCYTES NFR BLD: 0.2 %
INR PPP: 1.55 (ref 0.86–1.14)
LYMPHOCYTES # BLD AUTO: 1.6 10E9/L (ref 0.8–5.3)
LYMPHOCYTES NFR BLD AUTO: 31.3 %
MCH RBC QN AUTO: 28.4 PG (ref 26.5–33)
MCHC RBC AUTO-ENTMCNC: 32.6 G/DL (ref 31.5–36.5)
MCV RBC AUTO: 87 FL (ref 78–100)
MONOCYTES # BLD AUTO: 0.4 10E9/L (ref 0–1.3)
MONOCYTES NFR BLD AUTO: 8.2 %
NEUTROPHILS # BLD AUTO: 3 10E9/L (ref 1.6–8.3)
NEUTROPHILS NFR BLD AUTO: 59.1 %
PLATELET # BLD AUTO: 118 10E9/L (ref 150–450)
RBC # BLD AUTO: 6.86 10E12/L (ref 3.8–5.2)
WBC # BLD AUTO: 5 10E9/L (ref 4–11)

## 2017-09-26 PROCEDURE — 90834 PSYTX W PT 45 MINUTES: CPT | Performed by: PSYCHOLOGIST

## 2017-09-26 PROCEDURE — 85025 COMPLETE CBC W/AUTO DIFF WBC: CPT | Performed by: FAMILY MEDICINE

## 2017-09-26 PROCEDURE — 99207 ZZC NO CHARGE NURSE ONLY: CPT

## 2017-09-26 PROCEDURE — 85610 PROTHROMBIN TIME: CPT | Performed by: FAMILY MEDICINE

## 2017-09-26 PROCEDURE — 36415 COLL VENOUS BLD VENIPUNCTURE: CPT | Performed by: FAMILY MEDICINE

## 2017-09-26 NOTE — MR AVS SNAPSHOT
Bia Bill   9/26/2017   Anticoagulation Therapy Visit    Description:  51 year old female   Provider:  Melisa Murphy RN   Department:  [facility]           INR as of 9/26/2017     Today's INR 1.55!      Anticoagulation Summary as of 9/26/2017     INR goal 2.0-3.0   Today's INR 1.55!   Full instructions 9/27: 15 mg; Otherwise 10 mg on Mon, Wed, Fri; 15 mg all other days   Next INR check 9/29/2017    Indications   Long term current use of anticoagulant therapy [Z79.01]  DVT (deep venous thrombosis) (H) [I82.409]  PVD (peripheral vascular disease) with claudication (H) [I73.9]         September 2017 Details    Sun Mon Tue Wed Thu Fri Sat          1               2                 3               4               5               6               7               8               9                 10               11               12               13               14               15               16                 17               18               19               20               21               22               23                 24               25               26      15 mg   See details      27      15 mg         28      15 mg         29            30                Date Details   09/26 This INR check       Date of next INR:  9/29/2017         How to take your warfarin dose     To take:  10 mg Take 2 of the 5 mg tablets.    To take:  15 mg Take 3 of the 5 mg tablets.

## 2017-09-26 NOTE — PROGRESS NOTES
ANTICOAGULATION FOLLOW-UP CLINIC VISIT    Patient Name:  Bia Bill  Date:  9/26/2017  Contact Type:  Telephone/ [place]    SUBJECTIVE:     Patient Findings     Positives Bruising (quarter sized bruise on abdomen, witnessed after appt with Kd Morris), Intentional hold of therapy (Pt held warfarin 9/22 and 9/23)           OBJECTIVE    INR   Date Value Ref Range Status   09/26/2017 1.55 (H) 0.86 - 1.14 Final     Comment:     Special tube used to correct for high hematocrit       ASSESSMENT / PLAN  No question data found.  Anticoagulation Summary as of 9/26/2017     INR goal 2.0-3.0   Today's INR 1.55!   Maintenance plan 10 mg (5 mg x 2) on Mon, Wed, Fri; 15 mg (5 mg x 3) all other days   Full instructions 9/27: 15 mg; Otherwise 10 mg on Mon, Wed, Fri; 15 mg all other days   Weekly total 90 mg   Plan last modified Mireille Torres RN (5/3/2017)   Next INR check 9/29/2017   Target end date Indefinite    Indications   Long term current use of anticoagulant therapy [Z79.01]  DVT (deep venous thrombosis) (H) [I82.409]  PVD (peripheral vascular disease) with claudication (H) [I73.9]         Anticoagulation Episode Summary     INR check location     Preferred lab     Send INR reminders to [facility]    Comments * lab draw due to elevated hematocrit (fingerstick meters don't work). LEFT LE. STENT x 3 LEFT UPPER LEG. Previous arterial clot.      Anticoagulation Care Providers     Provider Role Specialty Phone number    Kd Leong MD [facility] [phone]            See the Encounter Report to view Anticoagulation Flowsheet and Dosing Calendar (Go to Encounters tab in chart review, and find the Anticoagulation Therapy Visit)        Melisa Murphy RN

## 2017-09-26 NOTE — MR AVS SNAPSHOT
MRN:[id number]                      After Visit Summary   9/26/2017    Bia Bill    MRN: [id number]           Visit Information        Provider Department      9/26/2017 10:30 AM Arturo Kd CHAWLA [facility]      Your next 10 appointments already scheduled     Sep 29, 2017  8:35 AM CDT   LAB with [facility] ([facility])    [address] [address]   [phone]           Patient must bring picture ID. Patient should be prepared to give a urine specimen  Please do not eat 10-12 hours before your appointment if you are coming in fasting for labs on lipids, cholesterol, or glucose (sugar). Pregnant women should follow their Care Team instructions. Water with medications is okay. Do not drink coffee or other fluids. If you have concerns about taking  your medications, please ask at office or if scheduling via Guavas, send a message by clicking on Secure Messaging, Message Your Care Team.            Oct 10, 2017 10:30 AM CDT   Return Visit with Kd Durhamon   [facility] ([facility])    [address] [address]   [phone]            Oct 13, 2017  1:30 PM CDT   LAB with [facility] ([facility])    [address] [address]   [phone]           Patient must bring picture ID. Patient should be prepared to give a urine specimen  Please do not eat 10-12 hours before your appointment if you are coming in fasting for labs on lipids, cholesterol, or glucose (sugar). Pregnant women should follow their Care Team instructions. Water with medications is okay. Do not drink coffee or other fluids. If you have concerns about taking  your medications, please ask at office or if scheduling via Guavas, send a message by clicking on Secure Messaging, Message Your Care Team.            Oct 24, 2017 10:30  AM CDT   Return Visit with Kd Morris   [facility] ([facility])    [address]  [place] [address]   [phone]            Nov 17, 2017  1:30 PM CST   LAB with [facility] ([facility])    [address] [address]   [phone]           Patient must bring picture ID. Patient should be prepared to give a urine specimen  Please do not eat 10-12 hours before your appointment if you are coming in fasting for labs on lipids, cholesterol, or glucose (sugar). Pregnant women should follow their Care Team instructions. Water with medications is okay. Do not drink coffee or other fluids. If you have concerns about taking  your medications, please ask at office or if scheduling via GetMaid, send a message by clicking on Secure Messaging, Message Your Care Team.            Dec 15, 2017  1:30 PM CST   LAB with [facility] ([facility])    [address] [address]   [phone]           Patient must bring picture ID. Patient should be prepared to give a urine specimen  Please do not eat 10-12 hours before your appointment if you are coming in fasting for labs on lipids, cholesterol, or glucose (sugar). Pregnant women should follow their Care Team instructions. Water with medications is okay. Do not drink coffee or other fluids. If you have concerns about taking  your medications, please ask at office or if scheduling via GetMaid, send a message by clicking on Secure Messaging, Message Your Care Team.            Dec 15, 2017  1:35 PM CST   LAB with [facility] ([facility])    [address] [address]   [phone]           Patient must bring picture ID. Patient should be prepared to give a urine specimen  Please do not eat 10-12 hours before your appointment if you are coming in fasting for labs on lipids,  cholesterol, or glucose (sugar). Pregnant women should follow their Care Team instructions. Water with medications is okay. Do not drink coffee or other fluids. If you have concerns about taking  your medications, please ask at office or if scheduling via Engiver, send a message by clicking on Secure Messaging, Message Your Care Team.            Jan 12, 2018  1:30 PM CST   LAB with [facility]   [facility] ([facility])    [address] [address]   [phone]           Patient must bring picture ID. Patient should be prepared to give a urine specimen  Please do not eat 10-12 hours before your appointment if you are coming in fasting for labs on lipids, cholesterol, or glucose (sugar). Pregnant women should follow their Care Team instructions. Water with medications is okay. Do not drink coffee or other fluids. If you have concerns about taking  your medications, please ask at office or if scheduling via Engiver, send a message by clicking on Secure Messaging, Message Your Care Team.            Feb 09, 2018  1:30 PM CST   LAB with [facility]   [facility] ([facility])    [address] [address]   [phone]           Patient must bring picture ID. Patient should be prepared to give a urine specimen  Please do not eat 10-12 hours before your appointment if you are coming in fasting for labs on lipids, cholesterol, or glucose (sugar). Pregnant women should follow their Care Team instructions. Water with medications is okay. Do not drink coffee or other fluids. If you have concerns about taking  your medications, please ask at office or if scheduling via Engiver, send a message by clicking on Secure Messaging, Message Your Care Team.            Mar 16, 2018  1:30 PM CDT   LAB with [facility]   [facility] ([facility])    [address] [address]   [phone]           Patient must  "bring picture ID. Patient should be prepared to give a urine specimen  Please do not eat 10-12 hours before your appointment if you are coming in fasting for labs on lipids, cholesterol, or glucose (sugar). Pregnant women should follow their Care Team instructions. Water with medications is okay. Do not drink coffee or other fluids. If you have concerns about taking  your medications, please ask at office or if scheduling via Bee Shield, send a message by clicking on Secure Messaging, Message Your Care Team.              Bee [employer] Information     Bee Shield lets you send messages to your doctor, view your test results, renew your prescriptions, schedule appointments and more. To sign up, go to [email or web address] . Click on \"Log in\" on the left side of the screen, which will take you to the Welcome page. Then click on \"Sign up Now\" on the right side of the page.     You will be asked to enter the access code listed below, as well as some personal information. Please follow the directions to create your username and password.     Your access code is: [email or web address]  Expires: 10/8/2017  1:43 PM     Your access code will  in 90 days. If you need help or a new code, please call your [place] clinic or [phone].        Care EveryWhere ID     This is your Care EveryWhere ID. This could be used by other organizations to access your [place] medical records  [id number]        Equal Access to Services     ANTIONETTE IRBY : Hadii jose Clark, waaxda barrera, qaybta kaalnitin aguilar . So [facility] [phone].    ATENCIÓN: Si habla español, tiene a terry disposición servicios gratuitos de asistencia lingüística. Natalie al [phone].    We comply with applicable federal civil rights laws and [place] laws. We do not discriminate on the basis of race, color, national origin, age, disability sex, sexual orientation or gender identity.            "
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
## 2017-09-29 ENCOUNTER — ANTICOAGULATION THERAPY VISIT (OUTPATIENT)
Dept: ANTICOAGULATION | Facility: CLINIC | Age: 52
End: 2017-09-29
Payer: COMMERCIAL

## 2017-09-29 DIAGNOSIS — Z86.718 HISTORY OF DEEP VENOUS THROMBOSIS: ICD-10-CM

## 2017-09-29 DIAGNOSIS — Z79.01 LONG TERM CURRENT USE OF ANTICOAGULANT THERAPY: ICD-10-CM

## 2017-09-29 DIAGNOSIS — I73.9 PVD (PERIPHERAL VASCULAR DISEASE) WITH CLAUDICATION (H): ICD-10-CM

## 2017-09-29 DIAGNOSIS — D75.1 ERYTHROCYTOSIS: ICD-10-CM

## 2017-09-29 DIAGNOSIS — I82.409 DVT (DEEP VENOUS THROMBOSIS) (H): ICD-10-CM

## 2017-09-29 DIAGNOSIS — I73.9 PVD (PERIPHERAL VASCULAR DISEASE) (H): ICD-10-CM

## 2017-09-29 LAB
BASOPHILS # BLD AUTO: 0 10E9/L (ref 0–0.2)
BASOPHILS NFR BLD AUTO: 0.4 %
DIFFERENTIAL METHOD BLD: ABNORMAL
EOSINOPHIL # BLD AUTO: 0.1 10E9/L (ref 0–0.7)
EOSINOPHIL NFR BLD AUTO: 1.1 %
ERYTHROCYTE [DISTWIDTH] IN BLOOD BY AUTOMATED COUNT: 21.8 % (ref 10–15)
HCT VFR BLD AUTO: 59.4 % (ref 35–47)
HGB BLD-MCNC: 19.2 G/DL (ref 11.7–15.7)
IMM GRANULOCYTES # BLD: 0 10E9/L (ref 0–0.4)
IMM GRANULOCYTES NFR BLD: 0.4 %
INR PPP: 2.4 (ref 0.86–1.14)
LYMPHOCYTES # BLD AUTO: 1.7 10E9/L (ref 0.8–5.3)
LYMPHOCYTES NFR BLD AUTO: 31.5 %
MCH RBC QN AUTO: 28.4 PG (ref 26.5–33)
MCHC RBC AUTO-ENTMCNC: 32.3 G/DL (ref 31.5–36.5)
MCV RBC AUTO: 88 FL (ref 78–100)
MONOCYTES # BLD AUTO: 0.4 10E9/L (ref 0–1.3)
MONOCYTES NFR BLD AUTO: 8.2 %
NEUTROPHILS # BLD AUTO: 3.2 10E9/L (ref 1.6–8.3)
NEUTROPHILS NFR BLD AUTO: 58.4 %
PLATELET # BLD AUTO: 112 10E9/L (ref 150–450)
RBC # BLD AUTO: 6.75 10E12/L (ref 3.8–5.2)
WBC # BLD AUTO: 5.4 10E9/L (ref 4–11)

## 2017-09-29 PROCEDURE — 99207 ZZC NO CHARGE NURSE ONLY: CPT | Performed by: REGISTERED NURSE

## 2017-09-29 PROCEDURE — 85025 COMPLETE CBC W/AUTO DIFF WBC: CPT | Performed by: FAMILY MEDICINE

## 2017-09-29 PROCEDURE — 85610 PROTHROMBIN TIME: CPT | Performed by: FAMILY MEDICINE

## 2017-09-29 PROCEDURE — 36415 COLL VENOUS BLD VENIPUNCTURE: CPT | Performed by: FAMILY MEDICINE

## 2017-09-29 RX ORDER — WARFARIN SODIUM 5 MG/1
10 TABLET ORAL
Qty: 72 TABLET | Refills: 2 | Status: ON HOLD | COMMUNITY
Start: 2017-09-29 | End: 2017-10-20

## 2017-09-29 NOTE — PROGRESS NOTES
ANTICOAGULATION FOLLOW-UP CLINIC VISIT    Patient Name:  Bia Bill  Date:  9/29/2017  Contact Type:  Telephone/ discussed with pt    SUBJECTIVE:     Patient Findings     Positives Intentional hold of therapy (9/22 & 9/23), No Problem Findings (nothing reported)           OBJECTIVE    INR   Date Value Ref Range Status   09/29/2017 2.40 (H) 0.86 - 1.14 Final       ASSESSMENT / PLAN  INR assessment THER pt had 65mg/7 days; will dose for 70mg to remain to remain in goal range   Recheck INR In: 10 DAYS Tues 10/10/17   INR Location Clinic lab draw     Anticoagulation Summary as of 9/29/2017     INR goal 2.0-3.0   Today's INR 2.40   Maintenance plan 10 mg (5 mg x 2) every day   Full instructions 10 mg every day   Weekly total 70 mg   Plan last modified Brenna Rausch RN (9/29/2017)   Next INR check 10/10/2017   Target end date Indefinite    Indications   Long term current use of anticoagulant therapy [Z79.01]  DVT (deep venous thrombosis) (H) [I82.409]  PVD (peripheral vascular disease) with claudication (H) [I73.9]         Anticoagulation Episode Summary     INR check location     Preferred lab     Send INR reminders to [facility] PO[facility]    Comments * lab draw due to elevated hematocrit (fingerstick meters don't work). LEFT LE. STENT x 3 LEFT UPPER LEG. Previous arterial clot.      Anticoagulation Care Providers     Provider Role Specialty Phone number    Kd Leong MD [facility] [phone]            See the Encounter Report to view Anticoagulation Flowsheet and Dosing Calendar (Go to Encounters tab in chart review, and find the Anticoagulation Therapy Visit)    Brenna Rausch, RN

## 2017-09-29 NOTE — MR AVS SNAPSHOT
Bia Bill   9/29/2017   Anticoagulation Therapy Visit    Description:  51 year old female   Provider:  Brenna Rausch, RN   Department:  [facility]           INR as of 9/29/2017     Today's INR 2.40      Anticoagulation Summary as of 9/29/2017     INR goal 2.0-3.0   Today's INR 2.40   Full instructions 10 mg every day   Next INR check 10/10/2017    Indications   Long term current use of anticoagulant therapy [Z79.01]  DVT (deep venous thrombosis) (H) [I82.409]  PVD (peripheral vascular disease) with claudication (H) [I73.9]         Description     Begin 10mg daily.  Recheck INR 10/10/17.      September 2017 Details    Sun Mon Tue Wed Thu Fri Sat          1               2                 3               4               5               6               7               8               9                 10               11               12               13               14               15               16                 17               18               19               20               21               22               23                 24               25               26               27               28               29      10 mg   See details      30      10 mg          Date Details   09/29 This INR check               How to take your warfarin dose     To take:  10 mg Take 2 of the 5 mg tablets.           October 2017 Details    Sun Mon Tue Wed Thu Fri Sat     1      10 mg         2      10 mg         3      10 mg         4      10 mg         5      10 mg         6      10 mg         7      10 mg           8      10 mg         9      10 mg         10            11               12               13               14                 15               16               17               18               19               20               21                 22               23               24               25               26               27               28                 29               30                31                    Date Details   No additional details    Date of next INR:  10/10/2017         How to take your warfarin dose     To take:  10 mg Take 2 of the 5 mg tablets.

## 2017-10-04 DIAGNOSIS — E03.9 HYPOTHYROIDISM, UNSPECIFIED TYPE: ICD-10-CM

## 2017-10-04 NOTE — TELEPHONE ENCOUNTER
LEVOTHYROXIN 150MCG TAB SAND       Last Written Prescription Date: 3/16/17  Last Quantity: 90, # refills: 1  Last Office Visit with G, P or [facility] prescribing provider: 5/30/17   Next 5 appointments (look out 90 days)     Oct 10, 2017 10:30 AM CDT   Return Visit with [facility] ([facility]    [address] [address]   [phone]            Oct 24, 2017 10:30 AM CDT   Return Visit with [facility] ([facility])    [address] [address]   [phone]                   TSH   Date Value Ref Range Status   03/15/2017 2.79 0.40 - 4.00 mU/L Final

## 2017-10-09 DIAGNOSIS — M10.9 ACUTE GOUTY ARTHRITIS: ICD-10-CM

## 2017-10-09 NOTE — TELEPHONE ENCOUNTER
allopurinol        Last Written Prescription Date: 1/11/17  Last Fill Quantity: 90, # refills: 2  Last Office Visit with G, P or  Health prescribing provider:  5/30/17   Next 5 appointments (look out 90 days)     Oct 10, 2017 10:30 AM CDT   Return Visit with [facility] CAM[facility] ([facility]    [address] [place] [address]   [phone]            Oct 24, 2017 10:30 AM CDT   Return Visit with Kd CHAWLA [facility] ([facility])    [address] [place] [address]   [phone]                   Uric Acid   Date Value Ref Range Status   11/30/2015 4.5 2.6 - 6.0 mg/dL Final   ]  Creatinine   Date Value Ref Range Status   02/01/2017 0.79 0.52 - 1.04 mg/dL Final   ]  Lab Results   Component Value Date    WBC 5.4 09/29/2017     Lab Results   Component Value Date    RBC 6.75 09/29/2017     Lab Results   Component Value Date    HGB 19.2 09/29/2017     Lab Results   Component Value Date    HCT 59.4 09/29/2017     No components found for: MCT  Lab Results   Component Value Date    MCV 88 09/29/2017     Lab Results   Component Value Date    MCH 28.4 09/29/2017     Lab Results   Component Value Date    MCHC 32.3 09/29/2017     Lab Results   Component Value Date    RDW 21.8 09/29/2017     Lab Results   Component Value Date     09/29/2017     Lab Results   Component Value Date    AST 19 02/01/2017     Lab Results   Component Value Date    ALT 27 02/01/2017

## 2017-10-11 RX ORDER — LEVOTHYROXINE SODIUM 150 UG/1
TABLET ORAL
Qty: 90 TABLET | Refills: 2 | Status: SHIPPED | OUTPATIENT
Start: 2017-10-11 | End: 2018-05-16

## 2017-10-11 RX ORDER — ALLOPURINOL 100 MG/1
TABLET ORAL
Qty: 90 TABLET | Refills: 2 | Status: SHIPPED | OUTPATIENT
Start: 2017-10-11 | End: 2018-07-11

## 2017-10-11 NOTE — TELEPHONE ENCOUNTER
Prescription approved per [facility] Refill Protocol.  Mundo Conde RN

## 2017-10-11 NOTE — TELEPHONE ENCOUNTER
Prescription approved per [facility] Refill Protocol.  Mundo Conde RN

## 2017-10-13 ENCOUNTER — ANTICOAGULATION THERAPY VISIT (OUTPATIENT)
Dept: ANTICOAGULATION | Facility: CLINIC | Age: 52
End: 2017-10-13
Payer: COMMERCIAL

## 2017-10-13 DIAGNOSIS — Z79.01 LONG TERM CURRENT USE OF ANTICOAGULANT THERAPY: ICD-10-CM

## 2017-10-13 DIAGNOSIS — I82.409 DVT (DEEP VENOUS THROMBOSIS) (H): ICD-10-CM

## 2017-10-13 DIAGNOSIS — I73.9 PVD (PERIPHERAL VASCULAR DISEASE) WITH CLAUDICATION (H): ICD-10-CM

## 2017-10-13 DIAGNOSIS — D75.1 ERYTHROCYTOSIS: ICD-10-CM

## 2017-10-13 DIAGNOSIS — I73.9 PERIPHERAL VASCULAR DISEASE WITH CLAUDICATION (H): ICD-10-CM

## 2017-10-13 LAB
BASOPHILS # BLD AUTO: 0.1 10E9/L (ref 0–0.2)
BASOPHILS NFR BLD AUTO: 0.9 %
DIFFERENTIAL METHOD BLD: ABNORMAL
EOSINOPHIL # BLD AUTO: 0.1 10E9/L (ref 0–0.7)
EOSINOPHIL NFR BLD AUTO: 1.7 %
ERYTHROCYTE [DISTWIDTH] IN BLOOD BY AUTOMATED COUNT: 22 % (ref 10–15)
HCT VFR BLD AUTO: 59.2 % (ref 35–47)
HGB BLD-MCNC: 19 G/DL (ref 11.7–15.7)
INR PPP: 2.67 (ref 0.86–1.14)
LYMPHOCYTES # BLD AUTO: 1.7 10E9/L (ref 0.8–5.3)
LYMPHOCYTES NFR BLD AUTO: 29.9 %
MCH RBC QN AUTO: 29 PG (ref 26.5–33)
MCHC RBC AUTO-ENTMCNC: 32.1 G/DL (ref 31.5–36.5)
MCV RBC AUTO: 90 FL (ref 78–100)
MONOCYTES # BLD AUTO: 0.5 10E9/L (ref 0–1.3)
MONOCYTES NFR BLD AUTO: 8.2 %
NEUTROPHILS # BLD AUTO: 3.4 10E9/L (ref 1.6–8.3)
NEUTROPHILS NFR BLD AUTO: 59.3 %
PLATELET # BLD AUTO: 108 10E9/L (ref 150–450)
RBC # BLD AUTO: 6.55 10E12/L (ref 3.8–5.2)
WBC # BLD AUTO: 5.8 10E9/L (ref 4–11)

## 2017-10-13 PROCEDURE — 99207 ZZC NO CHARGE NURSE ONLY: CPT | Performed by: REGISTERED NURSE

## 2017-10-13 PROCEDURE — 85610 PROTHROMBIN TIME: CPT | Performed by: FAMILY MEDICINE

## 2017-10-13 PROCEDURE — 85025 COMPLETE CBC W/AUTO DIFF WBC: CPT | Performed by: FAMILY MEDICINE

## 2017-10-13 PROCEDURE — 36415 COLL VENOUS BLD VENIPUNCTURE: CPT | Performed by: FAMILY MEDICINE

## 2017-10-13 NOTE — PROGRESS NOTES
ANTICOAGULATION FOLLOW-UP CLINIC VISIT    Patient Name:  Bia Bill  Date:  10/13/2017  Contact Type:  Telephone/ left VM for pt; included [facility] # and request for call back with any missed doses/changes/concerns etc    SUBJECTIVE:     Patient Findings     Positives No Problem Findings (nothing reported or charted)    Comments Pt has appt with PCP Tues 10/17/17; requested pt call if there are any changes.            OBJECTIVE    INR   Date Value Ref Range Status   10/13/2017 2.67 (H) 0.86 - 1.14 Final       ASSESSMENT / PLAN  INR assessment THER    Recheck INR In: 5 WEEKS    INR Location Clinic lab draw     Anticoagulation Summary as of 10/13/2017     INR goal 2.0-3.0   Today's INR    Maintenance plan 10 mg (5 mg x 2) every day   Full instructions 10 mg every day   Weekly total 70 mg   Plan last modified Brenna Rausch RN (9/29/2017)   Next INR check 11/17/2017   Target end date Indefinite    Indications   Long term current use of anticoagulant therapy [Z79.01]  DVT (deep venous thrombosis) (H) [I82.409]  PVD (peripheral vascular disease) with claudication (H) [I73.9]         Anticoagulation Episode Summary     INR check location     Preferred lab     Send INR reminders to [facility]    Comments * lab draw due to elevated hematocrit (fingerstick meters don't work). LEFT LE. STENT x 3 LEFT UPPER LEG. Previous arterial clot.      Anticoagulation Care Providers     Provider Role Specialty Phone number    Kd Leong MD [facility] [phone]            See the Encounter Report to view Anticoagulation Flowsheet and Dosing Calendar (Go to Encounters tab in chart review, and find the Anticoagulation Therapy Visit)    Brenna Rausch, RN

## 2017-10-13 NOTE — MR AVS SNAPSHOT
Bia S Bill   10/13/2017   Anticoagulation Therapy Visit    Description:  51 year old female   Provider:  Brenna Rausch, RN   Department:  [facility]           INR as of 10/13/2017     Today's INR 2.67      Anticoagulation Summary as of 10/13/2017     INR goal 2.0-3.0   Today's INR 2.67   Full instructions 10 mg every day   Next INR check 11/17/2017    Indications   Long term current use of anticoagulant therapy [Z79.01]  DVT (deep venous thrombosis) (H) [I82.409]  PVD (peripheral vascular disease) with claudication (H) [I73.9]         Description     No change, recheck INR in 5 weeks.      October 2017 Details    Sun Mon Tue Wed Thu Fri Sat     1               2               3               4               5               6               7                 8               9               10               11               12               13      10 mg   See details      14      10 mg           15      10 mg         16      10 mg         17      10 mg         18      10 mg         19      10 mg         20      10 mg         21      10 mg           22      10 mg         23      10 mg         24      10 mg         25      10 mg         26      10 mg         27      10 mg         28      10 mg           29      10 mg         30      10 mg         31      10 mg              Date Details   10/13 This INR check               How to take your warfarin dose     To take:  10 mg Take 2 of the 5 mg tablets.           November 2017 Details    Sun Mon Tue Wed Thu Fri Sat        1      10 mg         2      10 mg         3      10 mg         4      10 mg           5      10 mg         6      10 mg         7      10 mg         8      10 mg         9      10 mg         10      10 mg         11      10 mg           12      10 mg         13      10 mg         14      10 mg         15      10 mg         16      10 mg         17            18                 19               20               21               22                23               24               25                 26               27               28               29               30                  Date Details   No additional details    Date of next INR:  11/17/2017         How to take your warfarin dose     To take:  10 mg Take 2 of the 5 mg tablets.

## 2017-10-17 ENCOUNTER — OFFICE VISIT (OUTPATIENT)
Dept: FAMILY MEDICINE | Facility: CLINIC | Age: 52
End: 2017-10-17
Payer: COMMERCIAL

## 2017-10-17 VITALS
HEART RATE: 68 BPM | WEIGHT: 222.4 LBS | TEMPERATURE: 97.9 F | BODY MASS INDEX: 44.84 KG/M2 | SYSTOLIC BLOOD PRESSURE: 127 MMHG | OXYGEN SATURATION: 95 % | DIASTOLIC BLOOD PRESSURE: 68 MMHG | HEIGHT: 59 IN

## 2017-10-17 DIAGNOSIS — K21.9 GASTROESOPHAGEAL REFLUX DISEASE WITHOUT ESOPHAGITIS: ICD-10-CM

## 2017-10-17 DIAGNOSIS — E03.9 HYPOTHYROIDISM, UNSPECIFIED TYPE: ICD-10-CM

## 2017-10-17 DIAGNOSIS — M50.30 DDD (DEGENERATIVE DISC DISEASE), CERVICAL: ICD-10-CM

## 2017-10-17 DIAGNOSIS — J45.30 MILD PERSISTENT ASTHMA WITHOUT COMPLICATION: Primary | ICD-10-CM

## 2017-10-17 DIAGNOSIS — G47.33 OSA (OBSTRUCTIVE SLEEP APNEA): ICD-10-CM

## 2017-10-17 DIAGNOSIS — F17.200 SMOKER: ICD-10-CM

## 2017-10-17 DIAGNOSIS — J98.01 ACUTE BRONCHOSPASM: ICD-10-CM

## 2017-10-17 DIAGNOSIS — R05.9 COUGH: ICD-10-CM

## 2017-10-17 DIAGNOSIS — E66.2 MORBID OBESITY WITH ALVEOLAR HYPOVENTILATION (H): ICD-10-CM

## 2017-10-17 DIAGNOSIS — F31.62 MODERATE MIXED BIPOLAR I DISORDER (H): ICD-10-CM

## 2017-10-17 DIAGNOSIS — Z79.01 CURRENT USE OF LONG TERM ANTICOAGULATION: ICD-10-CM

## 2017-10-17 PROCEDURE — 99214 OFFICE O/P EST MOD 30 MIN: CPT | Performed by: FAMILY MEDICINE

## 2017-10-17 RX ORDER — ALBUTEROL SULFATE 0.83 MG/ML
1 SOLUTION RESPIRATORY (INHALATION) EVERY 6 HOURS PRN
Qty: 1 BOX | Refills: 0 | Status: SHIPPED | OUTPATIENT
Start: 2017-10-17 | End: 2019-11-01

## 2017-10-17 RX ORDER — ALBUTEROL SULFATE 90 UG/1
2 AEROSOL, METERED RESPIRATORY (INHALATION) EVERY 6 HOURS PRN
Qty: 3 INHALER | Refills: 1 | Status: SHIPPED | OUTPATIENT
Start: 2017-10-17 | End: 2019-11-01

## 2017-10-17 ASSESSMENT — ANXIETY QUESTIONNAIRES
7. FEELING AFRAID AS IF SOMETHING AWFUL MIGHT HAPPEN: NOT AT ALL
6. BECOMING EASILY ANNOYED OR IRRITABLE: SEVERAL DAYS
1. FEELING NERVOUS, ANXIOUS, OR ON EDGE: SEVERAL DAYS
2. NOT BEING ABLE TO STOP OR CONTROL WORRYING: SEVERAL DAYS
GAD7 TOTAL SCORE: 7
3. WORRYING TOO MUCH ABOUT DIFFERENT THINGS: MORE THAN HALF THE DAYS
5. BEING SO RESTLESS THAT IT IS HARD TO SIT STILL: SEVERAL DAYS

## 2017-10-17 ASSESSMENT — PATIENT HEALTH QUESTIONNAIRE - PHQ9
5. POOR APPETITE OR OVEREATING: SEVERAL DAYS
SUM OF ALL RESPONSES TO PHQ QUESTIONS 1-9: 9

## 2017-10-17 NOTE — NURSING NOTE
"Chief Complaint   Patient presents with     Recheck Medication       Initial /68 (BP Location: Right arm, Patient Position: Sitting, Cuff Size: Adult Large)  Pulse 68  Temp 97.9  F (36.6  C) (Tympanic)  Ht 4' 11\" (1.499 m)  Wt 222 lb 6.4 oz (100.9 kg)  LMP 09/27/2009  SpO2 95%  BMI 44.92 kg/m2 Estimated body mass index is 44.92 kg/(m^2) as calculated from the following:    Height as of this encounter: 4' 11\" (1.499 m).    Weight as of this encounter: 222 lb 6.4 oz (100.9 kg).  Medication Reconciliation: complete   Jennifer Harris CMA    "

## 2017-10-17 NOTE — PROGRESS NOTES
SUBJECTIVE:                                                    Bia Bill is a 51 year old female who presents to clinic today for the following health issues:    **Here for a general follow up on all of her medications. She says that her depression has been a lot better since her last visit.   **She is taking all of her medication as prescribed with no side effects.       Problem list and histories reviewed & adjusted, as indicated.    Additional history: has cut back to 3 cigs a day.    Patient Active Problem List   Diagnosis     Mild persistent asthma     Polyneuropathy in other diseases classified elsewhere (H)     DVT (deep venous thrombosis) (H)     Alcohol abuse, in remission     SECONDARY POLYCYTHEMIA     Chondromalacia of patella     Sensorineural hearing loss, asymmetrical     Developmental reading disorder     Esophageal reflux     Hypothyroidism     Fatty liver     GILES (Obstructive Sleep Apnea)-Moderate (AHI 16)     RLS (restless legs syndrome)     CARDIOVASCULAR SCREENING; LDL GOAL LESS THAN 100     Smoker     Erythrocytosis     Moderate mixed bipolar I disorder (H)     Personality disorder, depressive     COPD (chronic obstructive pulmonary disease) (H)     Current use of long term anticoagulation     Rosacea     Health Care Home     DDD (degenerative disc disease), cervical     Benign neoplasm of colon (POLYPOSIS)     Stroke (H)     Somatization disorder     Acute gouty arthritis     Sebaceous cyst of right axilla     HTN, goal below 140/90     Left leg pain     Right shoulder pain     Headache     Vitamin D deficiency     Long term current use of anticoagulant therapy     Morbid obesity (H)     PVD (peripheral vascular disease) with claudication (H)     Trochanteric bursitis of both hips     Cyst of left ovary     Acute bronchospasm     Morbid obesity with alveolar hypoventilation (H)     Past Surgical History:   Procedure Laterality Date     BILIARY STENT SINGLE USE COV      3 stints in left  "leg     BONE MARROW BIOPSY, BONE SPECIMEN, NEEDLE/TROCAR N/A 11/17/2014    Procedure: BIOPSY BONE MARROW;  Surgeon: Hay Aaron MD;  Location: [facility]     D & C  10/26/09    with uterine ablation     ESOPHAGOSCOPY, GASTROSCOPY, DUODENOSCOPY (EGD), COMBINED  4/21/2014    Procedure: Gastroscopy;  Surgeon: Moris Thomas MD;  Location: [facility]     HYSTERECTOMY, PAP NO LONGER INDICATED  1-4-2010     LITHOTRIPSY  2004    Lithotrypsy       Social History   Substance Use Topics     Smoking status: Current Every Day Smoker     Packs/day: 0.50     Years: 34.00     Types: Cigarettes     Smokeless tobacco: Never Used      Comment: started at age 11.  Quit during pregnancyX4.  0.5 ppd (6/2016)     Alcohol use No      Comment: quit 1995     Family History   Problem Relation Age of Onset     Hypertension Mother      DIABETES Mother      Blood Disease Mother      HEART DISEASE Mother      chf     CEREBROVASCULAR DISEASE Mother      Hypertension Father      CANCER Father      lung cancer     Allergies Sister      Depression Sister      Hypertension Sister          Wt Readings from Last 10 Encounters:   10/17/17 222 lb 6.4 oz (100.9 kg)   09/22/17 225 lb 3.2 oz (102.2 kg)   07/10/17 229 lb 11.2 oz (104.2 kg)   05/30/17 230 lb 3.2 oz (104.4 kg)   05/01/17 231 lb 8 oz (105 kg)   04/27/17 227 lb (103 kg)   03/29/17 227 lb (103 kg)   03/24/17 234 lb 4.8 oz (106.3 kg)   03/20/17 233 lb 9.6 oz (106 kg)   03/16/17 229 lb 8 oz (104.1 kg)     ROS:  Constitutional, HEENT, cardiovascular, pulmonary, gi and gu systems are negative, except as otherwise noted.    OBJECTIVE:                                                    /68 (BP Location: Right arm, Patient Position: Sitting, Cuff Size: Adult Large)  Pulse 68  Temp 97.9  F (36.6  C) (Tympanic)  Ht 4' 11\" (1.499 m)  Wt 222 lb 6.4 oz (100.9 kg)  LMP 09/27/2009  SpO2 95%  BMI 44.92 kg/m2 Body mass index is 44.92 kg/(m^2).   GENERAL: healthy, alert, well nourished, " well hydrated, no distress  HENT: ear canals- normal; TMs- normal; Nose- normal; Mouth- no ulcers, no lesions  NECK: no tenderness, no adenopathy, no asymmetry, no masses, no stiffness; thyroid- normal to palpation  RESP: lungs clear to auscultation - no rales, no rhonchi, no wheezes  CV: regular rates and rhythm, normal S1 S2, no S3 or S4 and no murmur, no click or rub -  ABDOMEN: soft, no tenderness, no  hepatosplenomegaly, no masses, normal bowel sounds       ASSESSMENT/PLAN:                                                      (J45.30) Mild persistent asthma without complication  (primary encounter diagnosis)  Plan: albuterol (PROAIR HFA/PROVENTIL HFA/VENTOLIN         HFA) 108 (90 BASE) MCG/ACT Inhaler, albuterol         (2.5 MG/3ML) 0.083% neb solution      (E03.9) Hypothyroidism, unspecified type  Good control.  Continue currant medications, continue to monito     (G47.33) GILES (Obstructive Sleep Apnea)-Moderate (AHI 16)  Good control.  Continue currant medications, continue to monito     (F17.200) Smoker  Will tyry to decrease to 2 cig a day    (F31.62) Moderate mixed bipolar I disorder (H)  Good control.  Continue currant medications, continue to monito   Had episode of hypomania read a book when up at night.     (Z79.01) Current use of long term anticoagulation    (M50.30) DDD (degenerative disc disease), cervical   keep working on stretching    (E66.2) Morbid obesity with alveolar hypoventilation (H)  Will eat 1 fork full less of starches.    Chocolate  Like snickers bars .       (K21.9) Gastroesophageal reflux disease without esophagitis  Plan: omeprazole (PRILOSEC) 20 MG CR capsule  Discussed not to take daily       reports that she has been smoking Cigarettes.  She has a 17.00 pack-year smoking history. She has never used smokeless tobacco.  Tobacco Cessation Action Plan: Information offered: Patient not interested at this time    Weight management plan: Discussed healthy diet and exercise guidelines and  patient will follow up in 12 months in clinic to re-evaluate.    [facility]

## 2017-10-17 NOTE — MR AVS SNAPSHOT
After Visit Summary   10/17/2017    Bia Bill    MRN: [id number]           Patient Information     Date Of Birth          1965        Visit Information        Provider Department      10/17/2017 1:20 PM Kd Leong MD [facility] [place]        Today's Diagnoses     Mild persistent asthma without complication    -  1    Hypothyroidism, unspecified type        GILES (Obstructive Sleep Apnea)-Moderate (AHI 16)        Smoker        Moderate mixed bipolar I disorder (H)        Current use of long term anticoagulation        DDD (degenerative disc disease), cervical        Morbid obesity with alveolar hypoventilation (H)        Acute bronchospasm        Gastroesophageal reflux disease without esophagitis        Cough           Follow-ups after your visit        Your next 10 appointments already scheduled     Oct 25, 2017  2:40 PM CDT   SHORT with Kd Leong MD   [facility] [place] ([facility] [place])    [address] [facility] [address]   [phone]            Nov 08, 2017 12:00 PM CST   Return Visit with dK Morris   [facility] ([facility])    [address] [address]   [phone]            Nov 17, 2017  1:30 PM CST   LAB with [facility] ([facility])    [address] [address]   [phone]           Patient must bring picture ID. Patient should be prepared to give a urine specimen  Please do not eat 10-12 hours before your appointment if you are coming in fasting for labs on lipids, cholesterol, or glucose (sugar). Pregnant women should follow their Care Team instructions. Water with medications is okay. Do not drink coffee or other fluids. If you have concerns about taking  your medications, please ask at office or if scheduling via [employer], send a message by clicking on Secure Messaging, Message Your Care Team.            Nov 21, 2017  9:30 AM CST    Return Visit with Kd Morris   [facility] ([facility])    [address] [address]   [phone]            Dec 15, 2017  1:30 PM CST   [place] with [place] [facility]   [facility] ([facility])    [address] [address]   [phone]           Patient must bring picture ID. Patient should be prepared to give a urine specimen  Please do not eat 10-12 hours before your appointment if you are coming in fasting for labs on lipids, cholesterol, or glucose (sugar). Pregnant women should follow their Care Team instructions. Water with medications is okay. Do not drink coffee or other fluids. If you have concerns about taking  your medications, please ask at office or if scheduling via Babelway, send a message by clicking on Secure Messaging, Message Your Care Team.            Dec 15, 2017  1:35 PM CST   [place] [facility]   [facility] ([facility])    [address] [address]   [phone]           Patient must bring picture ID. Patient should be prepared to give a urine specimen  Please do not eat 10-12 hours before your appointment if you are coming in fasting for labs on lipids, cholesterol, or glucose (sugar). Pregnant women should follow their Care Team instructions. Water with medications is okay. Do not drink coffee or other fluids. If you have concerns about taking  your medications, please ask at office or if scheduling via [employer], send a message by clicking on Secure Messaging, Message Your Care Team.            Jan 12, 2018  1:30 PM CST   [place] [facility]   [facility] ([facility])    [address] [address]   [phone]           Patient must bring picture ID. Patient should be prepared to give a urine specimen  Please do not eat 10-12 hours before your appointment if you are coming in fasting for labs on lipids,  cholesterol, or glucose (sugar). Pregnant women should follow their Care Team instructions. Water with medications is okay. Do not drink coffee or other fluids. If you have concerns about taking  your medications, please ask at office or if scheduling via Tachyus, send a message by clicking on Secure Messaging, Message Your Care Team.            Feb 09, 2018  1:30 PM CST   [place] with [place] [facility]   [facility] ([facility])    [address] [address]   [phone]           Patient must bring picture ID. Patient should be prepared to give a urine specimen  Please do not eat 10-12 hours before your appointment if you are coming in fasting for labs on lipids, cholesterol, or glucose (sugar). Pregnant women should follow their Care Team instructions. Water with medications is okay. Do not drink coffee or other fluids. If you have concerns about taking  your medications, please ask at office or if scheduling via T[employer]us, send a message by clicking on Secure Messaging, Message Your Care Team.            Mar 16, 2018  1:30 PM CDT   [place] with [place] [facility]   [facility] ([facility])    [address] [address]   [phone]           Patient must bring picture ID. Patient should be prepared to give a urine specimen  Please do not eat 10-12 hours before your appointment if you are coming in fasting for labs on lipids, cholesterol, or glucose (sugar). Pregnant women should follow their Care Team instructions. Water with medications is okay. Do not drink coffee or other fluids. If you have concerns about taking  your medications, please ask at office or if scheduling via Tachyus, send a message by clicking on Secure Messaging, Message Your Care Team.            Mar 16, 2018  1:35 PM CDT   [place] with [place] [facility]   [facility] ([facility])    [address] [address]   [phone]           Patient must  "bring picture ID. Patient should be prepared to give a urine specimen  Please do not eat 10-12 hours before your appointment if you are coming in fasting for labs on lipids, cholesterol, or glucose (sugar). Pregnant women should follow their Care Team instructions. Water with medications is okay. Do not drink coffee or other fluids. If you have concerns about taking  your medications, please ask at office or if scheduling via [employer], send a message by clicking on Secure Messaging, Message Your Care Team.              Who to contact     Normal or non-critical lab and imaging results will be communicated to you by [facility], letter or phone within 4 business days after the clinic has received the results. If you do not hear from us within 7 days, please contact the clinic through [facility] or phone. If you have a critical or abnormal lab result, we will notify you by phone as soon as possible.  Submit refill requests through [employer] or call your pharmacy and they will forward the refill request to us. Please allow 3 business days for your refill to be completed.          If you need to speak with a  for additional information , please call: [phone]             Additional Information About Your Visit        [employer] Information     [employer] lets you send messages to your doctor, view your test results, renew your prescriptions, schedule appointments and more. To sign up, go to [email or web address] . Click on \"Log in\" on the left side of the screen, which will take you to the Welcome page. Then click on \"Sign up Now\" on the right side of the page.     You will be asked to enter the access code listed below, as well as some personal information. Please follow the directions to create your username and password.     Your access code is: [email or web address]  Expires: 2018 12:08 AM     Your access code will  in 90 days. If you need help or a new code, please call your [place] clinic or " "[phone].        Care EveryWhere ID     This is your Care EveryWhere ID. This could be used by other organizations to access your [place] medical records  [id number]        Your Vitals Were     Pulse Temperature Height Last Period Pulse Oximetry BMI (Body Mass Index)    68 97.9  F (36.6  C) (Tympanic) 4' 11\" (1.499 m) 09/27/2009 95% 44.92 kg/m2       Blood Pressure from Last 3 Encounters:   10/20/17 152/64   10/17/17 127/68   09/22/17 133/66    Weight from Last 3 Encounters:   10/19/17 226 lb 14.4 oz (102.9 kg)   10/17/17 222 lb 6.4 oz (100.9 kg)   09/22/17 225 lb 3.2 oz (102.2 kg)              Today, you had the following     No orders found for display         Today's Medication Changes          These changes are accurate as of: 10/17/17 11:59 PM.  If you have any questions, ask your nurse or doctor.               These medicines have changed or have updated prescriptions.        Dose/Directions    omeprazole 20 MG CR capsule   Commonly known as:  priLOSEC   This may have changed:  See the new instructions.   Used for:  Gastroesophageal reflux disease without esophagitis   Changed by:  Kd Leong MD        TAKE 1 CAPSULE (20 MG) BY MOUTH 2 TIMES DAILY   Quantity:  180 capsule   Refills:  1            Where to get your medicines      These medications were sent to [id number] [address]  [address] [address]    Hours:  Closed 10-16-08 business to [facility] Phone:  [phone]     albuterol (2.5 MG/3ML) 0.083% neb solution    albuterol 108 (90 BASE) MCG/ACT Inhaler    omeprazole 20 MG CR capsule                Primary Care Provider Office Phone # Fax #    Kd Leong [phone]       [facility] [address] [address]        Equal Access to Services     ANTIONETTE IRBY AH: Elfego Clark, waldemar rust, nitin nelsonaan ah. So [facility] [place] [facility] " [phone].    ATENCIÓN: Si rashid magana, tiene a terry disposición servicios gratuitos de asistencia lingüística. Natalie hong [phone].    We comply with applicable federal civil rights laws and [place] laws. We do not discriminate on the basis of race, color, national origin, age, disability, sex, sexual orientation, or gender identity.            Thank you!     Thank you for choosing [facility]  for your care. Our goal is always to provide you with excellent care. Hearing back from our patients is one way we can continue to improve our services. Please take a few minutes to complete the written survey that you may receive in the mail after your visit with us. Thank you!             Your Updated Medication List - Protect others around you: Learn how to safely use, store and throw away your medicines at www.disposemymeds.org.          This list is accurate as of: 10/17/17 11:59 PM.  Always use your most recent med list.                   Brand Name Dispense Instructions for use Diagnosis    acetaminophen 500 MG tablet    TYLENOL     Take 500-1,000 mg by mouth every 6 hours as needed for mild pain        * albuterol 108 (90 BASE) MCG/ACT Inhaler    PROAIR HFA/PROVENTIL HFA/VENTOLIN HFA    3 Inhaler    Inhale 2 puffs into the lungs every 6 hours as needed for shortness of breath / dyspnea or wheezing    Mild persistent asthma without complication       * albuterol (2.5 MG/3ML) 0.083% neb solution     1 Box    Take 1 vial (2.5 mg) by nebulization every 6 hours as needed for shortness of breath / dyspnea or wheezing    Mild persistent asthma without complication       allopurinol 100 MG tablet    ZYLOPRIM    90 tablet    TAKE ONE TABLET BY MOUTH ONE TIME DAILY    Acute gouty arthritis       ARIPiprazole 5 MG tablet    ABILIFY    180 tablet    Take 1.5 tablets (7.5 mg) by mouth At Bedtime    Major depressive disorder, recurrent episode, moderate (H)       ASPIRIN NOT PRESCRIBED    INTENTIONAL     by Other  route continuous prn Reported on 3/24/2017        buPROPion 150 MG 24 hr tablet    WELLBUTRIN XL    30 tablet    TAKE  ONE TABLET BY MOUTH EVERY DAY IN THE MORNING    COPD exacerbation (H)       calcium citrate-vitamin D 315-200 MG-UNIT Tabs per tablet    CITRACAL     Take two tablet at dinner and one tablet at bedtime.        EPINEPHrine 0.3 MG/0.3ML injection 2-pack    EPIPEN/ADRENACLICK/or ANY BX GENERIC EQUIV    0.6 mL    Inject 0.3 mLs (0.3 mg) into the muscle once as needed for anaphylaxis    Anaphylactic reaction to bee sting, undetermined intent, subsequent encounter       furosemide 20 MG tablet    LASIX    180 tablet    TAKE ONE TABLET BY MOUTH TWICE DAILY    Lymphedema of both lower extremities       * gabapentin 300 MG capsule    NEURONTIN    90 capsule    Take 1 capsule (300 mg) by mouth At Bedtime Take with 600mg at bedtime. For total dose of 600mg in am 600 at noon and 900mg at bedtime    Major depressive disorder, recurrent episode, moderate (H), Restless leg syndrome       * gabapentin 600 MG tablet    NEURONTIN    90 tablet    TAKE 1 TABLET (600 MG) BY MOUTH 3 TIMES DAILY    PVD (peripheral vascular disease) with claudication (H)       lamoTRIgine 200 MG tablet    LaMICtal    90 tablet    Take 1 tablet (200 mg) by mouth daily    Major depressive disorder, recurrent episode, moderate (H)       levothyroxine 150 MCG tablet    SYNTHROID/LEVOTHROID    90 tablet    TAKE ONE TABLET BY MOUTH DAILY    Hypothyroidism, unspecified type       mometasone-formoterol 100-5 MCG/ACT oral inhaler    DULERA    13 g    Inhale 2 puffs into the lungs 2 times daily    Mild persistent asthma without complication       omeprazole 20 MG CR capsule    priLOSEC    180 capsule    TAKE 1 CAPSULE (20 MG) BY MOUTH 2 TIMES DAILY    Gastroesophageal reflux disease without esophagitis       * order for DME      Equipment being ordered: CPAP AIRSENSE 10 5-18 CM H20 SN# [id number]   DN# [id number]        * order for DME     1 Month     "Equipment being ordered: INCONTINENCE PADS  QID    Other urinary incontinence       * order for DME     60 Month    Equipment being ordered: DEPENDS SIZE LARGE    Mixed incontinence       * order for DME     2 Units    Equipment being ordered: [id number]\" x2pair   \"20-30mmHg Farrow Hybrid Liners\" x 2 pair    Peripheral edema       simvastatin 20 MG tablet    ZOCOR    90 tablet    TAKE ONE TABLET BY MOUTH AT BEDTIME    Hypercholesteremia       traZODone 150 MG tablet    DESYREL    90 tablet    Take 1 tablet (150 mg) by mouth At Bedtime    Major depressive disorder, recurrent episode, moderate (H)       warfarin 5 MG tablet    COUMADIN    72 tablet    Take 10 mg by mouth daily (with dinner) Take 10mg by mouth daily or As directed by Anticoagulation Clinic    DVT (deep venous thrombosis) (H), Long term current use of anticoagulant therapy       * Notice:  This list has 8 medication(s) that are the same as other medications prescribed for you. Read the directions carefully, and ask your doctor or other care provider to review them with you.      "

## 2017-10-18 ENCOUNTER — HOSPITAL ENCOUNTER (OUTPATIENT)
Facility: CLINIC | Age: 52
Setting detail: OBSERVATION
Discharge: HOME | End: 2017-10-20
Attending: STUDENT IN AN ORGANIZED HEALTH CARE EDUCATION/TRAINING PROGRAM | Admitting: STUDENT IN AN ORGANIZED HEALTH CARE EDUCATION/TRAINING PROGRAM
Payer: COMMERCIAL

## 2017-10-18 ENCOUNTER — APPOINTMENT (OUTPATIENT)
Dept: CT IMAGING | Facility: CLINIC | Age: 52
End: 2017-10-18
Attending: STUDENT IN AN ORGANIZED HEALTH CARE EDUCATION/TRAINING PROGRAM
Payer: COMMERCIAL

## 2017-10-18 DIAGNOSIS — K62.5 BRIGHT RED BLOOD PER RECTUM: ICD-10-CM

## 2017-10-18 DIAGNOSIS — D69.6 THROMBOCYTOPENIA (H): ICD-10-CM

## 2017-10-18 DIAGNOSIS — D75.1 ERYTHROCYTOSIS: ICD-10-CM

## 2017-10-18 DIAGNOSIS — N39.0 ACUTE UTI: Primary | ICD-10-CM

## 2017-10-18 DIAGNOSIS — K92.2 GASTROINTESTINAL HEMORRHAGE, UNSPECIFIED GASTROINTESTINAL HEMORRHAGE TYPE: ICD-10-CM

## 2017-10-18 LAB
ALBUMIN SERPL-MCNC: 3.2 G/DL (ref 3.4–5)
ALP SERPL-CCNC: 99 U/L (ref 40–150)
ALT SERPL W P-5'-P-CCNC: 33 U/L (ref 0–50)
ANION GAP SERPL CALCULATED.3IONS-SCNC: 4 MMOL/L (ref 3–14)
AST SERPL W P-5'-P-CCNC: 18 U/L (ref 0–45)
BASOPHILS # BLD AUTO: 0 10E9/L (ref 0–0.2)
BASOPHILS NFR BLD AUTO: 0.3 %
BILIRUB SERPL-MCNC: 0.5 MG/DL (ref 0.2–1.3)
BUN SERPL-MCNC: 11 MG/DL (ref 7–30)
CALCIUM SERPL-MCNC: 8.6 MG/DL (ref 8.5–10.1)
CHLORIDE SERPL-SCNC: 108 MMOL/L (ref 94–109)
CO2 SERPL-SCNC: 27 MMOL/L (ref 20–32)
CREAT SERPL-MCNC: 1 MG/DL (ref 0.52–1.04)
DIFFERENTIAL METHOD BLD: ABNORMAL
EOSINOPHIL # BLD AUTO: 0.1 10E9/L (ref 0–0.7)
EOSINOPHIL NFR BLD AUTO: 1.5 %
ERYTHROCYTE [DISTWIDTH] IN BLOOD BY AUTOMATED COUNT: 21.2 % (ref 10–15)
GFR SERPL CREATININE-BSD FRML MDRD: 59 ML/MIN/1.7M2
GLUCOSE SERPL-MCNC: 100 MG/DL (ref 70–99)
HCT VFR BLD AUTO: 57.2 % (ref 35–47)
HGB BLD-MCNC: 18.7 G/DL (ref 11.7–15.7)
IMM GRANULOCYTES # BLD: 0 10E9/L (ref 0–0.4)
IMM GRANULOCYTES NFR BLD: 0.3 %
INR PPP: 2.09 (ref 0.86–1.14)
INR PPP: NORMAL (ref 0.86–1.14)
LACTATE BLD-SCNC: 1.2 MMOL/L (ref 0.7–2)
LIPASE SERPL-CCNC: 133 U/L (ref 73–393)
LYMPHOCYTES # BLD AUTO: 1.7 10E9/L (ref 0.8–5.3)
LYMPHOCYTES NFR BLD AUTO: 29.7 %
MCH RBC QN AUTO: 28.7 PG (ref 26.5–33)
MCHC RBC AUTO-ENTMCNC: 32.7 G/DL (ref 31.5–36.5)
MCV RBC AUTO: 88 FL (ref 78–100)
MONOCYTES # BLD AUTO: 0.4 10E9/L (ref 0–1.3)
MONOCYTES NFR BLD AUTO: 7.5 %
NEUTROPHILS # BLD AUTO: 3.5 10E9/L (ref 1.6–8.3)
NEUTROPHILS NFR BLD AUTO: 60.7 %
PLATELET # BLD AUTO: 115 10E9/L (ref 150–450)
POTASSIUM SERPL-SCNC: 3.8 MMOL/L (ref 3.4–5.3)
PROT SERPL-MCNC: 6.1 G/DL (ref 6.8–8.8)
RBC # BLD AUTO: 6.51 10E12/L (ref 3.8–5.2)
SODIUM SERPL-SCNC: 139 MMOL/L (ref 133–144)
WBC # BLD AUTO: 5.8 10E9/L (ref 4–11)

## 2017-10-18 PROCEDURE — 96376 TX/PRO/DX INJ SAME DRUG ADON: CPT

## 2017-10-18 PROCEDURE — 86850 RBC ANTIBODY SCREEN: CPT | Performed by: STUDENT IN AN ORGANIZED HEALTH CARE EDUCATION/TRAINING PROGRAM

## 2017-10-18 PROCEDURE — 25000128 H RX IP 250 OP 636: Performed by: STUDENT IN AN ORGANIZED HEALTH CARE EDUCATION/TRAINING PROGRAM

## 2017-10-18 PROCEDURE — 85610 PROTHROMBIN TIME: CPT | Mod: 91 | Performed by: STUDENT IN AN ORGANIZED HEALTH CARE EDUCATION/TRAINING PROGRAM

## 2017-10-18 PROCEDURE — 99285 EMERGENCY DEPT VISIT HI MDM: CPT | Mod: 25

## 2017-10-18 PROCEDURE — 85025 COMPLETE CBC W/AUTO DIFF WBC: CPT | Performed by: STUDENT IN AN ORGANIZED HEALTH CARE EDUCATION/TRAINING PROGRAM

## 2017-10-18 PROCEDURE — 96375 TX/PRO/DX INJ NEW DRUG ADDON: CPT

## 2017-10-18 PROCEDURE — 80053 COMPREHEN METABOLIC PANEL: CPT | Performed by: STUDENT IN AN ORGANIZED HEALTH CARE EDUCATION/TRAINING PROGRAM

## 2017-10-18 PROCEDURE — 86900 BLOOD TYPING SEROLOGIC ABO: CPT | Performed by: STUDENT IN AN ORGANIZED HEALTH CARE EDUCATION/TRAINING PROGRAM

## 2017-10-18 PROCEDURE — 83690 ASSAY OF LIPASE: CPT | Performed by: STUDENT IN AN ORGANIZED HEALTH CARE EDUCATION/TRAINING PROGRAM

## 2017-10-18 PROCEDURE — 25000125 ZZHC RX 250: Performed by: STUDENT IN AN ORGANIZED HEALTH CARE EDUCATION/TRAINING PROGRAM

## 2017-10-18 PROCEDURE — 99285 EMERGENCY DEPT VISIT HI MDM: CPT | Mod: 25 | Performed by: STUDENT IN AN ORGANIZED HEALTH CARE EDUCATION/TRAINING PROGRAM

## 2017-10-18 PROCEDURE — 83605 ASSAY OF LACTIC ACID: CPT | Performed by: STUDENT IN AN ORGANIZED HEALTH CARE EDUCATION/TRAINING PROGRAM

## 2017-10-18 PROCEDURE — 74177 CT ABD & PELVIS W/CONTRAST: CPT

## 2017-10-18 PROCEDURE — 96374 THER/PROPH/DIAG INJ IV PUSH: CPT

## 2017-10-18 PROCEDURE — 86901 BLOOD TYPING SEROLOGIC RH(D): CPT | Performed by: STUDENT IN AN ORGANIZED HEALTH CARE EDUCATION/TRAINING PROGRAM

## 2017-10-18 RX ORDER — IOPAMIDOL 755 MG/ML
100 INJECTION, SOLUTION INTRAVASCULAR ONCE
Status: COMPLETED | OUTPATIENT
Start: 2017-10-18 | End: 2017-10-18

## 2017-10-18 RX ORDER — HYDROMORPHONE HYDROCHLORIDE 1 MG/ML
0.5 INJECTION, SOLUTION INTRAMUSCULAR; INTRAVENOUS; SUBCUTANEOUS
Status: COMPLETED | OUTPATIENT
Start: 2017-10-18 | End: 2017-10-19

## 2017-10-18 RX ORDER — DIPHENHYDRAMINE HYDROCHLORIDE 50 MG/ML
25 INJECTION INTRAMUSCULAR; INTRAVENOUS ONCE
Status: COMPLETED | OUTPATIENT
Start: 2017-10-18 | End: 2017-10-18

## 2017-10-18 RX ADMIN — SODIUM CHLORIDE 60 ML: 9 INJECTION, SOLUTION INTRAVENOUS at 22:44

## 2017-10-18 RX ADMIN — IOPAMIDOL 100 ML: 755 INJECTION, SOLUTION INTRAVENOUS at 22:43

## 2017-10-18 RX ADMIN — HYDROMORPHONE HYDROCHLORIDE 0.5 MG: 1 INJECTION, SOLUTION INTRAMUSCULAR; INTRAVENOUS; SUBCUTANEOUS at 21:38

## 2017-10-18 RX ADMIN — PANTOPRAZOLE SODIUM 40 MG: 40 INJECTION, POWDER, FOR SOLUTION INTRAVENOUS at 21:44

## 2017-10-18 RX ADMIN — DIPHENHYDRAMINE HYDROCHLORIDE 25 MG: 50 INJECTION, SOLUTION INTRAMUSCULAR; INTRAVENOUS at 21:39

## 2017-10-18 RX ADMIN — HYDROMORPHONE HYDROCHLORIDE 0.5 MG: 1 INJECTION, SOLUTION INTRAMUSCULAR; INTRAVENOUS; SUBCUTANEOUS at 22:27

## 2017-10-18 ASSESSMENT — ANXIETY QUESTIONNAIRES: GAD7 TOTAL SCORE: 7

## 2017-10-18 NOTE — IP AVS SNAPSHOT
MRN:[id number]                      After Visit Summary   10/18/2017    Bia Bill    MRN: [id number]           Thank you!     Thank you for choosing [facility] for your care. Our goal is always to provide you with excellent care. Hearing back from our patients is one way we can continue to improve our services. Please take a few minutes to complete the written survey that you may receive in the mail after you visit with us. Thank you!        Patient Information     Date Of Birth          1965        Designated Caregiver       Most Recent Value    Caregiver    Will someone help with your care after discharge? no      About your hospital stay     You were admitted on:  October 19, 2017 You last received care in the:  [facility]    You were discharged on:  October 20, 2017        Reason for your hospital stay       Acute GI Bleed - EGD was negative - INR which was elevated at admission has decreased from 2 to 1.2                  Who to Call     For medical emergencies, please call 911.  For non-urgent questions about your medical care, please call your primary care provider or clinic, [phone]          Attending Provider     Provider Specialty    Patel Centeno,  Emergency Medicine    StevenmiAaron guevara MD Internal Medicine    Braulio Godoy MD Internal Medicine       Primary Care Provider Office Phone # Fax #    Kd Leong [phone]       When to contact your care team       Call your primary doctor if you have any of the following:  increased shortness of breath, Shortness of breath, Bleeding from the Rectum or mouth, Hematemesis or Melena. .                  After Care Instructions     Activity       Your activity upon discharge: activity as tolerated            Diet       Follow this diet upon discharge: Orders Placed This Encounter      Advance Diet as Tolerated: Regular Diet Adult                  Follow-up Appointments     Follow  Up and recommended labs and tests       Follow up with primary care provider, Kd Leong, within 7 days for hospital follow- up.  The following labs/tests are recommended: Check INR and CBC   Restart Warfarin in 2-4 weeks as decides by primary care Physician .            Follow-up and recommended labs and tests        CBC, INR and BMP when following up with Primary care physician                  Your next 10 appointments already scheduled     Oct 24, 2017 10:30 AM CDT   Return Visit with Kd Morris   [facility] ([facility])    [address] [address]   [phone]            Oct 25, 2017  2:40 PM CDT   SHORT with Kd Leong MD   [facility] ([facility])    [address] [address]   [phone]            Nov 17, 2017  1:30 PM CST   LAB with [facility] ([facility])    [address] [address]   [phone]           Patient must bring picture ID. Patient should be prepared to give a urine specimen  Please do not eat 10-12 hours before your appointment if you are coming in fasting for labs on lipids, cholesterol, or glucose (sugar). Pregnant women should follow their Care Team instructions. Water with medications is okay. Do not drink coffee or other fluids. If you have concerns about taking  your medications, please ask at office or if scheduling via Flexion, send a message by clicking on Secure Messaging, Message Your Care Team.            Dec 15, 2017  1:30 PM CST   LAB with [facility] ([facility])    [address] [address]   [phone]           Patient must bring picture ID. Patient should be prepared to give a urine specimen  Please do not eat 10-12 hours before your appointment if you are coming in fasting for labs on lipids, cholesterol, or glucose (sugar). Pregnant women should follow  their Care Team instructions. Water with medications is okay. Do not drink coffee or other fluids. If you have concerns about taking  your medications, please ask at office or if scheduling via [employer]oh[employer], send a message by clicking on Secure Messaging, Message Your Care Team.            Dec 15, 2017  1:35 PM CST   LAB with [facility] ([facility])    [address] [address]   [phone]           Patient must bring picture ID. Patient should be prepared to give a urine specimen  Please do not eat 10-12 hours before your appointment if you are coming in fasting for labs on lipids, cholesterol, or glucose (sugar). Pregnant women should follow their Care Team instructions. Water with medications is okay. Do not drink coffee or other fluids. If you have concerns about taking  your medications, please ask at office or if scheduling via [employer]oh[employer], send a message by clicking on Secure Messaging, Message Your Care Team.            Jan 12, 2018  1:30 PM CST   LAB with [facility] ([facility])    [address] [address]   [phone]           Patient must bring picture ID. Patient should be prepared to give a urine specimen  Please do not eat 10-12 hours before your appointment if you are coming in fasting for labs on lipids, cholesterol, or glucose (sugar). Pregnant women should follow their Care Team instructions. Water with medications is okay. Do not drink coffee or other fluids. If you have concerns about taking  your medications, please ask at office or if scheduling via Xiaoh[employer]shu, send a message by clicking on Secure Messaging, Message Your Care Team.            Feb 09, 2018  1:30 PM CST   LAB with [facility] ([facility])    [address] [address]   [phone]           Patient must bring picture ID. Patient should be prepared to give a urine  specimen  Please do not eat 10-12 hours before your appointment if you are coming in fasting for labs on lipids, cholesterol, or glucose (sugar). Pregnant women should follow their Care Team instructions. Water with medications is okay. Do not drink coffee or other fluids. If you have concerns about taking  your medications, please ask at office or if scheduling via [employer], send a message by clicking on Secure Messaging, Message Your Care Team.            Mar 16, 2018  1:30 PM CDT   LAB with [facility])    [address] [address]   [phone]           Patient must bring picture ID. Patient should be prepared to give a urine specimen  Please do not eat 10-12 hours before your appointment if you are coming in fasting for labs on lipids, cholesterol, or glucose (sugar). Pregnant women should follow their Care Team instructions. Water with medications is okay. Do not drink coffee or other fluids. If you have concerns about taking  your medications, please ask at office or if scheduling via [employer], send a message by clicking on Secure Messaging, Message Your Care Team.            Mar 16, 2018  1:35 PM CDT   LAB with [facility]   [facility] ([facility])    [address] [address]   [phone]           Patient must bring picture ID. Patient should be prepared to give a urine specimen  Please do not eat 10-12 hours before your appointment if you are coming in fasting for labs on lipids, cholesterol, or glucose (sugar). Pregnant women should follow their Care Team instructions. Water with medications is okay. Do not drink coffee or other fluids. If you have concerns about taking  your medications, please ask at office or if scheduling via [employer], send a message by clicking on Secure Messaging, Message Your Care Team.            Mar 23, 2018  1:00 PM CDT   Return Visit with Joshua Watts MD  "  [facility] ([facility])    [facility]  [address]  [facility] [address] [phone]              Pending Results     Date and Time Order Name Status Description    10/19/2017 1315 Urine Culture Aerobic Bacterial Preliminary             Admission Information     Date & Time Provider Department Dept. Phone    10/18/2017 Braulio Godoy MD [facility] Intensive [facility] [phone]      Your Vitals Were     Blood Pressure Pulse Temperature Respirations Height Weight    152/64 77 98.2  F (36.8  C) (Oral) 16 1.486 m (4' 10.5\") 102.9 kg (226 lb 14.4 oz)    Last Period Pulse Oximetry BMI (Body Mass Index)             2009 95% 46.61 kg/m2         [facility] Information     [facility] lets you send messages to your doctor, view your test results, renew your prescriptions, schedule appointments and more. To sign up, go to [email or web address] . Click on \"Log in\" on the left side of the screen, which will take you to the Welcome page. Then click on \"Sign up Now\" on the right side of the page.     You will be asked to enter the access code listed below, as well as some personal information. Please follow the directions to create your username and password.     Your access code is: [email or web address]  Expires: 2018 12:08 AM     Your access code will  in 90 days. If you need help or a new code, please call your [facility] clinic or [phone].        Care EveryWhere ID     This is your Care EveryWhere ID. This could be used by other organizations to access your [facility] medical records  [id number]        Equal Access to Services     ANTIONETTE IRBY : Elfego Clark, waldemar rust, emma clay, nitin craig. So [facility] [phone].    ATENCIÓN: Si habla español, tiene a terry disposición servicios gratuitos de asistencia lingüística. Llame al [phone].    We comply with applicable federal civil rights " laws and [place] laws. We do not discriminate on the basis of race, color, national origin, age, disability, sex, sexual orientation, or gender identity.               Review of your medicines      START taking        Dose / Directions    cefdinir 300 MG capsule   Commonly known as:  OMNICEF   Used for:  Acute UTI        Dose:  300 mg   Take 1 capsule (300 mg) by mouth 2 times daily for 9 days   Quantity:  18 capsule   Refills:  0         CONTINUE these medicines which have NOT CHANGED        Dose / Directions    acetaminophen 500 MG tablet   Commonly known as:  TYLENOL        Dose:  500-1000 mg   Take 500-1,000 mg by mouth every 6 hours as needed for mild pain   Refills:  0       * albuterol 108 (90 BASE) MCG/ACT Inhaler   Commonly known as:  PROAIR HFA/PROVENTIL HFA/VENTOLIN HFA   Used for:  Mild persistent asthma without complication        Dose:  2 puff   Inhale 2 puffs into the lungs every 6 hours as needed for shortness of breath / dyspnea or wheezing   Quantity:  3 Inhaler   Refills:  1       * albuterol (2.5 MG/3ML) 0.083% neb solution   Used for:  Mild persistent asthma without complication        Dose:  1 vial   Take 1 vial (2.5 mg) by nebulization every 6 hours as needed for shortness of breath / dyspnea or wheezing   Quantity:  1 Box   Refills:  0       allopurinol 100 MG tablet   Commonly known as:  ZYLOPRIM   Used for:  Acute gouty arthritis        TAKE ONE TABLET BY MOUTH ONE TIME DAILY   Quantity:  90 tablet   Refills:  2       ARIPiprazole 5 MG tablet   Commonly known as:  ABILIFY   Used for:  Major depressive disorder, recurrent episode, moderate (H)        Dose:  7.5 mg   Take 1.5 tablets (7.5 mg) by mouth At Bedtime   Quantity:  180 tablet   Refills:  3       buPROPion 150 MG 24 hr tablet   Commonly known as:  WELLBUTRIN XL   Used for:  COPD exacerbation (H)        TAKE  ONE TABLET BY MOUTH EVERY DAY IN THE MORNING   Quantity:  30 tablet   Refills:  1       calcium citrate-vitamin D 315-200  MG-UNIT Tabs per tablet   Commonly known as:  CITRACAL        Take two tablet at dinner and one tablet at bedtime.   Refills:  0       EPINEPHrine 0.3 MG/0.3ML injection 2-pack   Commonly known as:  EPIPEN/ADRENACLICK/or ANY BX GENERIC EQUIV   Used for:  Anaphylactic reaction to bee sting, undetermined intent, subsequent encounter        Dose:  0.3 mg   Inject 0.3 mLs (0.3 mg) into the muscle once as needed for anaphylaxis   Quantity:  0.6 mL   Refills:  0       furosemide 20 MG tablet   Commonly known as:  LASIX   Used for:  Lymphedema of both lower extremities        TAKE ONE TABLET BY MOUTH TWICE DAILY   Quantity:  180 tablet   Refills:  1       * gabapentin 300 MG capsule   Commonly known as:  NEURONTIN   Used for:  Major depressive disorder, recurrent episode, moderate (H), Restless leg syndrome        Dose:  300 mg   Take 1 capsule (300 mg) by mouth At Bedtime Take with 600mg at bedtime. For total dose of 600mg in am 600 at noon and 900mg at bedtime   Quantity:  90 capsule   Refills:  3       * gabapentin 600 MG tablet   Commonly known as:  NEURONTIN   Used for:  PVD (peripheral vascular disease) with claudication (H)        TAKE 1 TABLET (600 MG) BY MOUTH 3 TIMES DAILY   Quantity:  90 tablet   Refills:  2       lamoTRIgine 200 MG tablet   Commonly known as:  LaMICtal   Used for:  Major depressive disorder, recurrent episode, moderate (H)        Dose:  200 mg   Take 1 tablet (200 mg) by mouth daily   Quantity:  90 tablet   Refills:  3       levothyroxine 150 MCG tablet   Commonly known as:  SYNTHROID/LEVOTHROID   Used for:  Hypothyroidism, unspecified type        TAKE ONE TABLET BY MOUTH DAILY   Quantity:  90 tablet   Refills:  2       mometasone-formoterol 100-5 MCG/ACT oral inhaler   Commonly known as:  DULERA   Used for:  Mild persistent asthma without complication        Dose:  2 puff   Inhale 2 puffs into the lungs 2 times daily   Quantity:  13 g   Refills:  11       omeprazole 20 MG CR capsule   Commonly  "known as:  priLOSEC   Used for:  Gastroesophageal reflux disease without esophagitis        TAKE 1 CAPSULE (20 MG) BY MOUTH 2 TIMES DAILY   Quantity:  180 capsule   Refills:  1       * order for DME        Equipment being ordered: CPAP AIRSENSE 10 5-18 CM H20 # [id number]   DN# [id number]   Refills:  0       * order for DME   Used for:  Other urinary incontinence        Equipment being ordered: INCONTINENCE PADS  QID   Quantity:  1 Month   Refills:  11       * order for DME   Used for:  Mixed incontinence        Equipment being ordered: DEPENDS SIZE LARGE   Quantity:  60 Month   Refills:  5       * order for DME   Used for:  Peripheral edema        Equipment being ordered: [id number]\" x2pair   \"20-30mmHg Farrow Hybrid Liners\" x 2 pair   Quantity:  2 Units   Refills:  11       simvastatin 20 MG tablet   Commonly known as:  ZOCOR   Used for:  Hypercholesteremia        TAKE ONE TABLET BY MOUTH AT BEDTIME   Quantity:  90 tablet   Refills:  3       traZODone 150 MG tablet   Commonly known as:  DESYREL   Used for:  Major depressive disorder, recurrent episode, moderate (H)        Dose:  150 mg   Take 1 tablet (150 mg) by mouth At Bedtime   Quantity:  90 tablet   Refills:  1       VITAMIN D (CHOLECALCIFEROL) PO        Dose:  1000 Units   Take 1,000 Units by mouth daily   Refills:  0       * Notice:  This list has 8 medication(s) that are the same as other medications prescribed for you. Read the directions carefully, and ask your doctor or other care provider to review them with you.      STOP taking     ASPIRIN NOT PRESCRIBED   Commonly known as:  INTENTIONAL           warfarin 5 MG tablet   Commonly known as:  COUMADIN                Where to get your medicines      These medications were sent to [id number] - [facility] [address]  [address] [address]    Hours:  Closed 10-16-08 business to [facility] Phone:  [phone]     cefdinir 300 MG capsule             "   ANTIBIOTIC INSTRUCTION     You've Been Prescribed an Antibiotic - Now What?  Your healthcare team thinks that you or your loved one might have an infection. Some infections can be treated with antibiotics, which are powerful, life-saving drugs. Like all medications, antibiotics have side effects and should only be used when necessary. There are some important things you should know about your antibiotic treatment.      Your healthcare team may run tests before you start taking an antibiotic.    Your team may take samples (e.g., from your blood, urine or other areas) to run tests to look for bacteria. These test can be important to determine if you need an antibiotic at all and, if you do, which antibiotic will work best.      Within a few days, your healthcare team might change or even stop your antibiotic.    Your team may start you on an antibiotic while they are working to find out what is making you sick.    Your team might change your antibiotic because test results show that a different antibiotic would be better to treat your infection.    In some cases, once your team has more information, they learn that you do not need an antibiotic at all. They may find out that you don't have an infection, or that the antibiotic you're taking won't work against your infection. For example, an infection caused by a virus can't be treated with antibiotics. Staying on an antibiotic when you don't need it is more likely to be harmful than helpful.      You may experience side effects from your antibiotic.    Like all medications, antibiotics have side effects. Some of these can be serious.    Let you healthcare team know if you have any known allergies when you are admitted to the hospital.    One significant side effect of nearly all antibiotics is the risk of severe and sometimes deadly diarrhea caused by Clostridium difficile (C. Difficile). This occurs when a person takes antibiotics because some good germs are  destroyed. Antibiotic use allows C. diificile to take over, putting patients at high risk for this serious infection.    As a patient or caregiver, it is important to understand your or your loved one's antibiotic treatment. It is especially important for caregivers to speak up when patients can't speak for themselves. Here are some important questions to ask your healthcare team.    What infection is this antibiotic treating and how do you know I have that infection?    What side effects might occur from this antibiotic?    How long will I need to take this antibiotic?    Is it safe to take this antibiotic with other medications or supplements (e.g., vitamins) that I am taking?     Are there any special directions I need to know about taking this antibiotic? For example, should I take it with food?    How will I be monitored to know whether my infection is responding to the antibiotic?    What tests may help to make sure the right antibiotic is prescribed for me?      Information provided by:  www.cdc.gov/getsmart  U.S. Department of Health and Human Services  Centers for disease Control and Prevention  National Center for Emerging and Zoonotic Infectious Diseases  Division of Healthcare Quality Promotion         Protect others around you: Learn how to safely use, store and throw away your medicines at www.disposemymeds.org.             Medication List: This is a list of all your medications and when to take them. Check marks below indicate your daily home schedule. Keep this list as a reference.      Medications           Morning Afternoon Evening Bedtime As Needed    acetaminophen 500 MG tablet   Commonly known as:  TYLENOL   Take 500-1,000 mg by mouth every 6 hours as needed for mild pain                                   * albuterol 108 (90 BASE) MCG/ACT Inhaler   Commonly known as:  PROAIR HFA/PROVENTIL HFA/VENTOLIN HFA   Inhale 2 puffs into the lungs every 6 hours as needed for shortness of breath / dyspnea  or wheezing                                   * albuterol (2.5 MG/3ML) 0.083% neb solution   Take 1 vial (2.5 mg) by nebulization every 6 hours as needed for shortness of breath / dyspnea or wheezing   Last time this was given:  2.5 mg on 10/19/2017 10:39 AM                                   allopurinol 100 MG tablet   Commonly known as:  ZYLOPRIM   TAKE ONE TABLET BY MOUTH ONE TIME DAILY   Last time this was given:  100 mg on 10/20/2017  8:36 AM                                   ARIPiprazole 5 MG tablet   Commonly known as:  ABILIFY   Take 1.5 tablets (7.5 mg) by mouth At Bedtime   Last time this was given:  7.5 mg on 10/19/2017 10:23 PM                                   buPROPion 150 MG 24 hr tablet   Commonly known as:  WELLBUTRIN XL   TAKE  ONE TABLET BY MOUTH EVERY DAY IN THE MORNING   Last time this was given:  150 mg on 10/19/2017 10:23 PM                                   calcium citrate-vitamin D 315-200 MG-UNIT Tabs per tablet   Commonly known as:  CITRACAL   Take two tablet at dinner and one tablet at bedtime.                                   cefdinir 300 MG capsule   Commonly known as:  OMNICEF   Take 1 capsule (300 mg) by mouth 2 times daily for 9 days                                      EPINEPHrine 0.3 MG/0.3ML injection 2-pack   Commonly known as:  EPIPEN/ADRENACLICK/or ANY BX GENERIC EQUIV   Inject 0.3 mLs (0.3 mg) into the muscle once as needed for anaphylaxis                                   furosemide 20 MG tablet   Commonly known as:  LASIX   TAKE ONE TABLET BY MOUTH TWICE DAILY                                      * gabapentin 300 MG capsule   Commonly known as:  NEURONTIN   Take 1 capsule (300 mg) by mouth At Bedtime Take with 600mg at bedtime. For total dose of 600mg in am 600 at noon and 900mg at bedtime   Last time this was given:  300 mg on 10/19/2017 10:23 PM                                   * gabapentin 600 MG tablet   Commonly known as:  NEURONTIN   TAKE 1 TABLET (600 MG) BY MOUTH 3  "TIMES DAILY   Last time this was given:  600 mg on 10/20/2017  2:41 PM                                      lamoTRIgine 200 MG tablet   Commonly known as:  LaMICtal   Take 1 tablet (200 mg) by mouth daily   Last time this was given:  200 mg on 10/20/2017  8:35 AM                                   levothyroxine 150 MCG tablet   Commonly known as:  SYNTHROID/LEVOTHROID   TAKE ONE TABLET BY MOUTH DAILY   Last time this was given:  150 mcg on 10/20/2017  8:36 AM                                   mometasone-formoterol 100-5 MCG/ACT oral inhaler   Commonly known as:  DULERA   Inhale 2 puffs into the lungs 2 times daily   Last time this was given:  2 puffs on 10/20/2017  8:35 AM                                      omeprazole 20 MG CR capsule   Commonly known as:  priLOSEC   TAKE 1 CAPSULE (20 MG) BY MOUTH 2 TIMES DAILY                                      * order for DME   Equipment being ordered: CPAP AIRSENSE 10 5-18 CM H20 SN# [id number]   DN# [id number]                                      * order for DME   Equipment being ordered: INCONTINENCE PADS  QID                                            * order for DME   Equipment being ordered: DEPENDS SIZE LARGE                                   * order for DME   Equipment being ordered: [id number]\" x2pair   \"20-30mmHg Farrow Hybrid Liners\" x 2 pair                                simvastatin 20 MG tablet   Commonly known as:  ZOCOR   TAKE ONE TABLET BY MOUTH AT BEDTIME   Last time this was given:  20 mg on 10/19/2017 10:23 PM                                   traZODone 150 MG tablet   Commonly known as:  DESYREL   Take 1 tablet (150 mg) by mouth At Bedtime   Last time this was given:  150 mg on 10/19/2017 10:23 PM                                   VITAMIN D (CHOLECALCIFEROL) PO   Take 1,000 Units by mouth daily                                   * Notice:  This list has 8 medication(s) that are the same as other medications prescribed for you. Read the directions carefully, " and ask your doctor or other care provider to review them with you.

## 2017-10-18 NOTE — IP AVS SNAPSHOT
[facility] Intensive Care    [address] [address]    Phone:  [phone]    Fax:  [phone]                                       After Visit Summary   10/18/2017    Bia Bill    MRN: [id number]           After Visit Summary Signature Page     I have received my discharge instructions, and my questions have been answered. I have discussed any challenges I see with this plan with the nurse or doctor.    ..........................................................................................................................................  Patient/Patient Representative Signature      ..........................................................................................................................................  Patient Representative Print Name and Relationship to Patient    ..................................................               ................................................  Date                                            Time    ..........................................................................................................................................  Reviewed by Signature/Title    ...................................................              ..............................................  Date                                                            Time

## 2017-10-19 ENCOUNTER — APPOINTMENT (OUTPATIENT)
Dept: GENERAL RADIOLOGY | Facility: CLINIC | Age: 52
End: 2017-10-19
Attending: PHYSICIAN ASSISTANT
Payer: COMMERCIAL

## 2017-10-19 PROBLEM — K92.2 GI BLEED: Status: ACTIVE | Noted: 2017-10-19

## 2017-10-19 PROBLEM — M75.40 IMPINGEMENT SYNDROME OF SHOULDER REGION: Status: RESOLVED | Noted: 2017-01-25 | Resolved: 2017-10-19

## 2017-10-19 PROBLEM — M19.019 ACROMIOCLAVICULAR JOINT ARTHRITIS: Status: ACTIVE | Noted: 2017-01-25

## 2017-10-19 PROBLEM — M75.40 IMPINGEMENT SYNDROME OF SHOULDER REGION: Status: ACTIVE | Noted: 2017-01-25

## 2017-10-19 PROBLEM — M19.019 ACROMIOCLAVICULAR JOINT ARTHRITIS: Status: RESOLVED | Noted: 2017-01-25 | Resolved: 2017-10-19

## 2017-10-19 LAB
ABO + RH BLD: NORMAL
ABO + RH BLD: NORMAL
ALBUMIN UR-MCNC: 10 MG/DL
ANION GAP SERPL CALCULATED.3IONS-SCNC: <1 MMOL/L (ref 3–14)
APPEARANCE UR: ABNORMAL
BILIRUB UR QL STRIP: NEGATIVE
BLD GP AB SCN SERPL QL: NORMAL
BLOOD BANK CMNT PATIENT-IMP: NORMAL
BUN SERPL-MCNC: 11 MG/DL (ref 7–30)
CALCIUM SERPL-MCNC: 8.3 MG/DL (ref 8.5–10.1)
CHLORIDE SERPL-SCNC: 111 MMOL/L (ref 94–109)
CO2 SERPL-SCNC: 27 MMOL/L (ref 20–32)
COLOR UR AUTO: YELLOW
CREAT SERPL-MCNC: 0.83 MG/DL (ref 0.52–1.04)
ERYTHROCYTE [DISTWIDTH] IN BLOOD BY AUTOMATED COUNT: 21.6 % (ref 10–15)
GFR SERPL CREATININE-BSD FRML MDRD: 72 ML/MIN/1.7M2
GLUCOSE SERPL-MCNC: 111 MG/DL (ref 70–99)
GLUCOSE UR STRIP-MCNC: NEGATIVE MG/DL
HCT VFR BLD AUTO: 57.5 % (ref 35–47)
HGB BLD-MCNC: 18.3 G/DL (ref 11.7–15.7)
HGB UR QL STRIP: ABNORMAL
INR PPP: 1.63 (ref 0.86–1.14)
KETONES UR STRIP-MCNC: NEGATIVE MG/DL
LEUKOCYTE ESTERASE UR QL STRIP: ABNORMAL
MCH RBC QN AUTO: 28.2 PG (ref 26.5–33)
MCHC RBC AUTO-ENTMCNC: 31.8 G/DL (ref 31.5–36.5)
MCV RBC AUTO: 89 FL (ref 78–100)
MRSA DNA SPEC QL NAA+PROBE: NEGATIVE
MUCOUS THREADS #/AREA URNS LPF: PRESENT /LPF
NITRATE UR QL: NEGATIVE
NT-PROBNP SERPL-MCNC: 61 PG/ML (ref 0–125)
PH UR STRIP: 6 PH (ref 5–7)
PLATELET # BLD AUTO: 107 10E9/L (ref 150–450)
POTASSIUM SERPL-SCNC: 4.2 MMOL/L (ref 3.4–5.3)
RBC # BLD AUTO: 6.49 10E12/L (ref 3.8–5.2)
RBC #/AREA URNS AUTO: 4 /HPF (ref 0–2)
SODIUM SERPL-SCNC: 138 MMOL/L (ref 133–144)
SOURCE: ABNORMAL
SP GR UR STRIP: 1.02 (ref 1–1.03)
SPECIMEN EXP DATE BLD: NORMAL
SPECIMEN SOURCE: NORMAL
SQUAMOUS #/AREA URNS AUTO: 1 /HPF (ref 0–1)
UROBILINOGEN UR STRIP-MCNC: NORMAL MG/DL (ref 0–2)
WBC # BLD AUTO: 5 10E9/L (ref 4–11)
WBC #/AREA URNS AUTO: 84 /HPF (ref 0–2)

## 2017-10-19 PROCEDURE — 25000132 ZZH RX MED GY IP 250 OP 250 PS 637: Performed by: PHYSICIAN ASSISTANT

## 2017-10-19 PROCEDURE — G0378 HOSPITAL OBSERVATION PER HR: HCPCS

## 2017-10-19 PROCEDURE — 71020 XR CHEST 2 VW: CPT

## 2017-10-19 PROCEDURE — 87640 STAPH A DNA AMP PROBE: CPT | Performed by: INTERNAL MEDICINE

## 2017-10-19 PROCEDURE — 80048 BASIC METABOLIC PNL TOTAL CA: CPT | Performed by: PHYSICIAN ASSISTANT

## 2017-10-19 PROCEDURE — 96361 HYDRATE IV INFUSION ADD-ON: CPT

## 2017-10-19 PROCEDURE — 96376 TX/PRO/DX INJ SAME DRUG ADON: CPT

## 2017-10-19 PROCEDURE — 81001 URINALYSIS AUTO W/SCOPE: CPT | Performed by: PHYSICIAN ASSISTANT

## 2017-10-19 PROCEDURE — 40000274 ZZH STATISTIC RCP CONSULT EA 30 MIN

## 2017-10-19 PROCEDURE — 85027 COMPLETE CBC AUTOMATED: CPT | Performed by: PHYSICIAN ASSISTANT

## 2017-10-19 PROCEDURE — 87186 SC STD MICRODIL/AGAR DIL: CPT | Performed by: PHYSICIAN ASSISTANT

## 2017-10-19 PROCEDURE — 83880 ASSAY OF NATRIURETIC PEPTIDE: CPT | Performed by: PHYSICIAN ASSISTANT

## 2017-10-19 PROCEDURE — 87641 MR-STAPH DNA AMP PROBE: CPT | Performed by: INTERNAL MEDICINE

## 2017-10-19 PROCEDURE — 25000132 ZZH RX MED GY IP 250 OP 250 PS 637: Performed by: INTERNAL MEDICINE

## 2017-10-19 PROCEDURE — 87088 URINE BACTERIA CULTURE: CPT | Performed by: PHYSICIAN ASSISTANT

## 2017-10-19 PROCEDURE — 40001007 ZZH STATISTIC RCP TIME COPD EDUC 10 MIN

## 2017-10-19 PROCEDURE — 25000128 H RX IP 250 OP 636: Performed by: PHYSICIAN ASSISTANT

## 2017-10-19 PROCEDURE — 25000128 H RX IP 250 OP 636: Performed by: STUDENT IN AN ORGANIZED HEALTH CARE EDUCATION/TRAINING PROGRAM

## 2017-10-19 PROCEDURE — 94640 AIRWAY INHALATION TREATMENT: CPT

## 2017-10-19 PROCEDURE — 25800025 ZZH RX 258: Performed by: PHYSICIAN ASSISTANT

## 2017-10-19 PROCEDURE — 99220 ZZC INITIAL OBSERVATION CARE,LEVL III: CPT | Performed by: PHYSICIAN ASSISTANT

## 2017-10-19 PROCEDURE — 96375 TX/PRO/DX INJ NEW DRUG ADDON: CPT

## 2017-10-19 PROCEDURE — 87086 URINE CULTURE/COLONY COUNT: CPT | Performed by: PHYSICIAN ASSISTANT

## 2017-10-19 PROCEDURE — 85610 PROTHROMBIN TIME: CPT | Performed by: PHYSICIAN ASSISTANT

## 2017-10-19 PROCEDURE — S5010 5% DEXTROSE AND 0.45% SALINE: HCPCS | Performed by: PHYSICIAN ASSISTANT

## 2017-10-19 PROCEDURE — 36415 COLL VENOUS BLD VENIPUNCTURE: CPT | Performed by: PHYSICIAN ASSISTANT

## 2017-10-19 PROCEDURE — 25000125 ZZHC RX 250: Performed by: PHYSICIAN ASSISTANT

## 2017-10-19 RX ORDER — ONDANSETRON 4 MG/1
4 TABLET, ORALLY DISINTEGRATING ORAL EVERY 6 HOURS PRN
Status: DISCONTINUED | OUTPATIENT
Start: 2017-10-19 | End: 2017-10-20 | Stop reason: HOSPADM

## 2017-10-19 RX ORDER — ONDANSETRON 2 MG/ML
4 INJECTION INTRAMUSCULAR; INTRAVENOUS EVERY 6 HOURS PRN
Status: DISCONTINUED | OUTPATIENT
Start: 2017-10-19 | End: 2017-10-20 | Stop reason: HOSPADM

## 2017-10-19 RX ORDER — NALOXONE HYDROCHLORIDE 0.4 MG/ML
.1-.4 INJECTION, SOLUTION INTRAMUSCULAR; INTRAVENOUS; SUBCUTANEOUS
Status: DISCONTINUED | OUTPATIENT
Start: 2017-10-19 | End: 2017-10-20 | Stop reason: HOSPADM

## 2017-10-19 RX ORDER — BUPROPION HYDROCHLORIDE 150 MG/1
150 TABLET ORAL DAILY
Status: DISCONTINUED | OUTPATIENT
Start: 2017-10-19 | End: 2017-10-19 | Stop reason: CLARIF

## 2017-10-19 RX ORDER — GABAPENTIN 600 MG/1
600 TABLET ORAL 3 TIMES DAILY
Status: DISCONTINUED | OUTPATIENT
Start: 2017-10-19 | End: 2017-10-20 | Stop reason: HOSPADM

## 2017-10-19 RX ORDER — LEVOTHYROXINE SODIUM 75 UG/1
150 TABLET ORAL DAILY
Status: DISCONTINUED | OUTPATIENT
Start: 2017-10-19 | End: 2017-10-20 | Stop reason: HOSPADM

## 2017-10-19 RX ORDER — ACETAMINOPHEN 325 MG/1
650 TABLET ORAL EVERY 4 HOURS PRN
Status: DISCONTINUED | OUTPATIENT
Start: 2017-10-19 | End: 2017-10-20 | Stop reason: HOSPADM

## 2017-10-19 RX ORDER — GABAPENTIN 300 MG/1
300 CAPSULE ORAL AT BEDTIME
Status: DISCONTINUED | OUTPATIENT
Start: 2017-10-19 | End: 2017-10-20 | Stop reason: HOSPADM

## 2017-10-19 RX ORDER — CEFTRIAXONE SODIUM 1 G/50ML
1 INJECTION, SOLUTION INTRAVENOUS EVERY 24 HOURS
Status: DISCONTINUED | OUTPATIENT
Start: 2017-10-19 | End: 2017-10-20 | Stop reason: HOSPADM

## 2017-10-19 RX ORDER — HYDROMORPHONE HYDROCHLORIDE 1 MG/ML
0.5 INJECTION, SOLUTION INTRAMUSCULAR; INTRAVENOUS; SUBCUTANEOUS
Status: DISCONTINUED | OUTPATIENT
Start: 2017-10-19 | End: 2017-10-20 | Stop reason: HOSPADM

## 2017-10-19 RX ORDER — BUPROPION HYDROCHLORIDE 150 MG/1
150 TABLET ORAL DAILY
Status: DISCONTINUED | OUTPATIENT
Start: 2017-10-19 | End: 2017-10-20 | Stop reason: HOSPADM

## 2017-10-19 RX ORDER — ALLOPURINOL 100 MG/1
100 TABLET ORAL DAILY
Status: DISCONTINUED | OUTPATIENT
Start: 2017-10-19 | End: 2017-10-20 | Stop reason: HOSPADM

## 2017-10-19 RX ORDER — ALBUTEROL SULFATE 0.83 MG/ML
1 SOLUTION RESPIRATORY (INHALATION)
Status: DISCONTINUED | OUTPATIENT
Start: 2017-10-19 | End: 2017-10-20 | Stop reason: HOSPADM

## 2017-10-19 RX ORDER — SIMVASTATIN 20 MG
20 TABLET ORAL AT BEDTIME
Status: DISCONTINUED | OUTPATIENT
Start: 2017-10-19 | End: 2017-10-20 | Stop reason: HOSPADM

## 2017-10-19 RX ORDER — LAMOTRIGINE 200 MG/1
200 TABLET ORAL DAILY
Status: DISCONTINUED | OUTPATIENT
Start: 2017-10-19 | End: 2017-10-20 | Stop reason: HOSPADM

## 2017-10-19 RX ORDER — LORAZEPAM 0.5 MG/1
.5-1 TABLET ORAL EVERY 4 HOURS PRN
Status: DISCONTINUED | OUTPATIENT
Start: 2017-10-19 | End: 2017-10-19

## 2017-10-19 RX ORDER — LORAZEPAM 2 MG/ML
.5-1 INJECTION INTRAMUSCULAR EVERY 4 HOURS PRN
Status: DISCONTINUED | OUTPATIENT
Start: 2017-10-19 | End: 2017-10-19

## 2017-10-19 RX ADMIN — CEFTRIAXONE SODIUM 1 G: 1 INJECTION, SOLUTION INTRAVENOUS at 18:20

## 2017-10-19 RX ADMIN — TRAZODONE HYDROCHLORIDE 150 MG: 100 TABLET ORAL at 22:23

## 2017-10-19 RX ADMIN — PANTOPRAZOLE SODIUM 40 MG: 40 INJECTION, POWDER, FOR SOLUTION INTRAVENOUS at 08:48

## 2017-10-19 RX ADMIN — GABAPENTIN 600 MG: 600 TABLET, FILM COATED ORAL at 19:54

## 2017-10-19 RX ADMIN — HYDROMORPHONE HYDROCHLORIDE 0.5 MG: 1 INJECTION, SOLUTION INTRAMUSCULAR; INTRAVENOUS; SUBCUTANEOUS at 12:24

## 2017-10-19 RX ADMIN — SIMVASTATIN 20 MG: 20 TABLET, FILM COATED ORAL at 22:23

## 2017-10-19 RX ADMIN — HYDROMORPHONE HYDROCHLORIDE 0.5 MG: 1 INJECTION, SOLUTION INTRAMUSCULAR; INTRAVENOUS; SUBCUTANEOUS at 19:54

## 2017-10-19 RX ADMIN — PANTOPRAZOLE SODIUM 40 MG: 40 INJECTION, POWDER, FOR SOLUTION INTRAVENOUS at 19:54

## 2017-10-19 RX ADMIN — LAMOTRIGINE 200 MG: 200 TABLET ORAL at 08:49

## 2017-10-19 RX ADMIN — BUPROPION HYDROCHLORIDE 150 MG: 150 TABLET, FILM COATED, EXTENDED RELEASE ORAL at 22:23

## 2017-10-19 RX ADMIN — HYDROMORPHONE HYDROCHLORIDE 0.5 MG: 1 INJECTION, SOLUTION INTRAMUSCULAR; INTRAVENOUS; SUBCUTANEOUS at 05:52

## 2017-10-19 RX ADMIN — ALLOPURINOL 100 MG: 100 TABLET ORAL at 08:49

## 2017-10-19 RX ADMIN — ALBUTEROL SULFATE 2.5 MG: 2.5 SOLUTION RESPIRATORY (INHALATION) at 10:39

## 2017-10-19 RX ADMIN — LEVOTHYROXINE SODIUM 150 MCG: 75 TABLET ORAL at 08:48

## 2017-10-19 RX ADMIN — GABAPENTIN 300 MG: 300 CAPSULE ORAL at 22:23

## 2017-10-19 RX ADMIN — GABAPENTIN 600 MG: 600 TABLET, FILM COATED ORAL at 08:48

## 2017-10-19 RX ADMIN — ARIPIPRAZOLE 7.5 MG: 15 TABLET ORAL at 22:23

## 2017-10-19 RX ADMIN — HYDROMORPHONE HYDROCHLORIDE 0.5 MG: 1 INJECTION, SOLUTION INTRAMUSCULAR; INTRAVENOUS; SUBCUTANEOUS at 16:00

## 2017-10-19 RX ADMIN — GABAPENTIN 600 MG: 600 TABLET, FILM COATED ORAL at 13:39

## 2017-10-19 RX ADMIN — DEXTROSE AND SODIUM CHLORIDE: 5; 450 INJECTION, SOLUTION INTRAVENOUS at 12:18

## 2017-10-19 RX ADMIN — HYDROMORPHONE HYDROCHLORIDE 0.5 MG: 1 INJECTION, SOLUTION INTRAMUSCULAR; INTRAVENOUS; SUBCUTANEOUS at 00:41

## 2017-10-19 ASSESSMENT — PAIN DESCRIPTION - DESCRIPTORS: DESCRIPTORS: ACHING;SHARP

## 2017-10-19 NOTE — PROGRESS NOTES
Pt remains npo, notified NP no IVF ordered. Pt complains of diffuse abdomen discomfort, abdomen distended firm, bowel sounds hypoactive distant. No nausea, no stools. Pt states she feels weak.   Has history of GILES, requested pt to have family bring in cpap.   Has pea size open/scabbed areas on chest, pt states family dog has fleas

## 2017-10-19 NOTE — PLAN OF CARE
Problem: Gastrointestinal Bleeding (Adult)  Intervention: Monitor/Manage Gastrointestinal Bleed Characteristics/Effects  Pharmacological intervention for abdominal pain.    Goal: Signs and Symptoms of Listed Potential Problems Will be Absent, Minimized or Managed (Gastrointestinal Bleeding)  Signs and symptoms of listed potential problems will be absent, minimized or managed by discharge/transition of care (reference Gastrointestinal Bleeding (Adult) CPG).   Outcome: Improving  Pt has had no rectal bleeding since admission.

## 2017-10-19 NOTE — ED NOTES
Pt presents to the ED via EMS with complaints of upper abdominal pain since 7pm. At 7 pm pt went to have a bowel movement and it was accompanied by bright red blood. Pt reports 3 blood stools in total since 7 pm. Pt has (never) had pain/rectal bleeding like this before. Pain is worse (on palpation). Pt has been nauseated and has NOT vomited. Last BM, just prior to arrival, mostly bright red blood. No change in bladder. NO blood in urine.

## 2017-10-19 NOTE — PROGRESS NOTES
Rocephin started on pt. IVF stopped. Ate 100% of clear liquid tray. Continues to have diffuse abdomen discomfort but has not increased since eating and has not had stool.
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
## 2017-10-19 NOTE — PROGRESS NOTES
CXR has cardiomegaly with mild interstitial prominence. BNP normal. Will stop IVF as we are now advancing diet. Consider diuresis if patient becomes SOB and consider ECHO as OP.     Bailey Henry PA-C
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
## 2017-10-19 NOTE — ED NOTES
"Patient has  [place] to Observation  order. Patient has been given the Observation brochure -  What does Observation mean to me.\"  Patient has been given the opportunity to ask questions about observation status and their plan of care.      Araceli Rios  "

## 2017-10-19 NOTE — PROGRESS NOTES
Pt ambulated to BR with SBA. Steady on feet when up. Voided 400cc slightly cloudy karen urine. C/O abd pain, states it's starting to get worse now.  Pt medicated with Dilaudid 0.5mg IV with good relief. Bed alarm on for fall risk.

## 2017-10-19 NOTE — PROGRESS NOTES
Pt presented to ER via ambulance post rectal bleed X 3 with abdominal pain, transferred to unit @0100 observation status.   A & O X 3, appropriate mood and affect, pleasant demeanor.  Dilaudid given in ER for abdominal pain. CT of abdomen (-) for acute process.  Pt slept throughout the night, no rectal bleeding.  Independent in room.   VSS

## 2017-10-19 NOTE — PROGRESS NOTES
"[facility] ADMISSION NOTE    Patient admitted to room 1007 at approximately 0100 via cart from emergency room.    Verbal SBAR report received from Araceli prior to patient arrival.     Patient ambulated to bed independently. Patient alert and oriented X 3. Pain is controlled with current analgesics.  Medication(s) being used: narcotic analgesics including hydromorphone (Dilaudid). 0-10 Pain Scale: 9. Admission vital signs: Blood pressure 129/74, temperature 97.2  F (36.2  C), temperature source Oral, resp. rate 16, height 1.486 m (4' 10.5\"), weight 102.9 kg (226 lb 14.4 oz), last menstrual period 09/27/2009, SpO2 97 %, not currently breastfeeding. Patient was oriented to plan of care, call light, bed controls, tv, telephone, bathroom and visiting hours.     The following safety risks were identified during admission: N/A. Yellow risk band applied: NO.     Toya Lan    "

## 2017-10-20 VITALS
WEIGHT: 226.9 LBS | HEART RATE: 77 BPM | TEMPERATURE: 98.2 F | SYSTOLIC BLOOD PRESSURE: 152 MMHG | DIASTOLIC BLOOD PRESSURE: 64 MMHG | OXYGEN SATURATION: 96 % | HEIGHT: 59 IN | RESPIRATION RATE: 16 BRPM | BODY MASS INDEX: 45.74 KG/M2

## 2017-10-20 LAB
ANION GAP SERPL CALCULATED.3IONS-SCNC: 4 MMOL/L (ref 3–14)
BASOPHILS # BLD AUTO: 0 10E9/L (ref 0–0.2)
BASOPHILS NFR BLD AUTO: 0.2 %
BUN SERPL-MCNC: 8 MG/DL (ref 7–30)
CALCIUM SERPL-MCNC: 8.4 MG/DL (ref 8.5–10.1)
CHLORIDE SERPL-SCNC: 108 MMOL/L (ref 94–109)
CO2 SERPL-SCNC: 26 MMOL/L (ref 20–32)
CREAT SERPL-MCNC: 0.81 MG/DL (ref 0.52–1.04)
DIFFERENTIAL METHOD BLD: ABNORMAL
EOSINOPHIL # BLD AUTO: 0.1 10E9/L (ref 0–0.7)
EOSINOPHIL NFR BLD AUTO: 1.2 %
ERYTHROCYTE [DISTWIDTH] IN BLOOD BY AUTOMATED COUNT: 21.5 % (ref 10–15)
GFR SERPL CREATININE-BSD FRML MDRD: 74 ML/MIN/1.7M2
GLUCOSE SERPL-MCNC: 95 MG/DL (ref 70–99)
HCT VFR BLD AUTO: 55.9 % (ref 35–47)
HGB BLD-MCNC: 17.7 G/DL (ref 11.7–15.7)
IMM GRANULOCYTES # BLD: 0 10E9/L (ref 0–0.4)
IMM GRANULOCYTES NFR BLD: 0.2 %
INR PPP: 1.2 (ref 0.86–1.14)
LYMPHOCYTES # BLD AUTO: 1.3 10E9/L (ref 0.8–5.3)
LYMPHOCYTES NFR BLD AUTO: 25.7 %
MCH RBC QN AUTO: 28.2 PG (ref 26.5–33)
MCHC RBC AUTO-ENTMCNC: 31.7 G/DL (ref 31.5–36.5)
MCV RBC AUTO: 89 FL (ref 78–100)
MONOCYTES # BLD AUTO: 0.4 10E9/L (ref 0–1.3)
MONOCYTES NFR BLD AUTO: 7 %
NEUTROPHILS # BLD AUTO: 3.4 10E9/L (ref 1.6–8.3)
NEUTROPHILS NFR BLD AUTO: 65.7 %
PLATELET # BLD AUTO: 110 10E9/L (ref 150–450)
POTASSIUM SERPL-SCNC: 4.2 MMOL/L (ref 3.4–5.3)
RBC # BLD AUTO: 6.28 10E12/L (ref 3.8–5.2)
SODIUM SERPL-SCNC: 138 MMOL/L (ref 133–144)
WBC # BLD AUTO: 5.1 10E9/L (ref 4–11)

## 2017-10-20 PROCEDURE — 25000132 ZZH RX MED GY IP 250 OP 250 PS 637: Performed by: PHYSICIAN ASSISTANT

## 2017-10-20 PROCEDURE — 25000125 ZZHC RX 250: Performed by: PHYSICIAN ASSISTANT

## 2017-10-20 PROCEDURE — 96376 TX/PRO/DX INJ SAME DRUG ADON: CPT

## 2017-10-20 PROCEDURE — 85025 COMPLETE CBC W/AUTO DIFF WBC: CPT | Performed by: STUDENT IN AN ORGANIZED HEALTH CARE EDUCATION/TRAINING PROGRAM

## 2017-10-20 PROCEDURE — 99217 ZZC OBSERVATION CARE DISCHARGE: CPT | Performed by: INTERNAL MEDICINE

## 2017-10-20 PROCEDURE — 99207 ZZC CDG-CODE CATEGORY CHANGED: CPT | Performed by: INTERNAL MEDICINE

## 2017-10-20 PROCEDURE — 25000132 ZZH RX MED GY IP 250 OP 250 PS 637: Performed by: INTERNAL MEDICINE

## 2017-10-20 PROCEDURE — 80048 BASIC METABOLIC PNL TOTAL CA: CPT | Performed by: STUDENT IN AN ORGANIZED HEALTH CARE EDUCATION/TRAINING PROGRAM

## 2017-10-20 PROCEDURE — 36415 COLL VENOUS BLD VENIPUNCTURE: CPT | Performed by: STUDENT IN AN ORGANIZED HEALTH CARE EDUCATION/TRAINING PROGRAM

## 2017-10-20 PROCEDURE — G0378 HOSPITAL OBSERVATION PER HR: HCPCS

## 2017-10-20 PROCEDURE — 85610 PROTHROMBIN TIME: CPT | Performed by: STUDENT IN AN ORGANIZED HEALTH CARE EDUCATION/TRAINING PROGRAM

## 2017-10-20 RX ORDER — CEFDINIR 300 MG/1
300 CAPSULE ORAL 2 TIMES DAILY
Qty: 18 CAPSULE | Refills: 0 | Status: SHIPPED | OUTPATIENT
Start: 2017-10-20 | End: 2017-10-29

## 2017-10-20 RX ADMIN — GABAPENTIN 600 MG: 600 TABLET, FILM COATED ORAL at 14:41

## 2017-10-20 RX ADMIN — GABAPENTIN 600 MG: 600 TABLET, FILM COATED ORAL at 08:36

## 2017-10-20 RX ADMIN — MAGNESIUM HYDROXIDE 30 ML: 400 SUSPENSION ORAL at 12:32

## 2017-10-20 RX ADMIN — LAMOTRIGINE 200 MG: 200 TABLET ORAL at 08:35

## 2017-10-20 RX ADMIN — PANTOPRAZOLE SODIUM 40 MG: 40 INJECTION, POWDER, FOR SOLUTION INTRAVENOUS at 08:35

## 2017-10-20 RX ADMIN — LEVOTHYROXINE SODIUM 150 MCG: 75 TABLET ORAL at 08:36

## 2017-10-20 RX ADMIN — ALLOPURINOL 100 MG: 100 TABLET ORAL at 08:36

## 2017-10-20 ASSESSMENT — PAIN DESCRIPTION - DESCRIPTORS: DESCRIPTORS: ACHING

## 2017-10-20 NOTE — PLAN OF CARE
Problem: Gastrointestinal Bleeding (Adult)  Intervention: Monitor/Manage Fluid Electrolyte Balance  Patient recd MILK OF MAGNESIA , large results.  States feels better but still some tenderness.       Comments:   Patient ambulating on unit.  Patient tolerating oral intake without nausea but some tenderness.

## 2017-10-20 NOTE — DISCHARGE SUMMARY
[facility]    Discharge Summary  Hospital Medicine    Date of Admission:  10/18/2017  Date of Discharge:  10/20/2017   Discharging Provider: Braulio Godoy  Date of Service: 10/20/2017     Primary Care     Kd Leong [facility] [id number] [address] [facility] [address]      Identification and Chief Compaint: Bia Bill is a 51 year old female with history of DVT on warfarin, erythrocytosis, GILES, COPD, CVA, tobacco use, depression with anxiety and bipolar depression who presents with multiple episode of abdominal pain with  BRBPR      Discharge Diagnoses     Acute GI bleeding  Probable acute UTI  Thrombocytopenia  COPD  DVT  GERD  Chronic pain  Hypothyroidism  Obstructive sleep apnea  Bipolar depression  Gout  Polyneuritis          Discharge Disposition   Discharged to home    Discharge Orders     Reason for your hospital stay   Acute GI Bleed - EGD was negative - INR which was elevated at admission has decreased from 2 to 1.2     Follow-up and recommended labs and tests    CBC, INR and BMP when following up with Primary care physician     Follow Up and recommended labs and tests   Follow up with primary care provider, Kd Leong, within 7 days for hospital follow- up.  The following labs/tests are recommended: Check INR and CBC   Restart Warfarin in 2-4 weeks as decides by primary care Physician .     Activity   Your activity upon discharge: activity as tolerated     When to contact your care team   Call your primary doctor if you have any of the following:  increased shortness of breath, Shortness of breath, Bleeding from the Rectum or mouth, Hematemesis or Melena. .     Diet   Follow this diet upon discharge: Orders Placed This Encounter     Advance Diet as Tolerated: Regular Diet Adult       Discharge Medications   Current Discharge Medication List      START taking these medications    Details   cefdinir (OMNICEF) 300 MG capsule Take 1 capsule (300 mg) by mouth 2  times daily for 9 days  Qty: 18 capsule, Refills: 0    Associated Diagnoses: Acute UTI         CONTINUE these medications which have NOT CHANGED    Details   VITAMIN D, CHOLECALCIFEROL, PO Take 1,000 Units by mouth daily      albuterol (PROAIR HFA/PROVENTIL HFA/VENTOLIN HFA) 108 (90 BASE) MCG/ACT Inhaler Inhale 2 puffs into the lungs every 6 hours as needed for shortness of breath / dyspnea or wheezing  Qty: 3 Inhaler, Refills: 1    Associated Diagnoses: Mild persistent asthma without complication      omeprazole (PRILOSEC) 20 MG CR capsule TAKE 1 CAPSULE (20 MG) BY MOUTH 2 TIMES DAILY  Qty: 180 capsule, Refills: 1    Associated Diagnoses: Gastroesophageal reflux disease without esophagitis      albuterol (2.5 MG/3ML) 0.083% neb solution Take 1 vial (2.5 mg) by nebulization every 6 hours as needed for shortness of breath / dyspnea or wheezing  Qty: 1 Box, Refills: 0    Associated Diagnoses: Mild persistent asthma without complication      levothyroxine (SYNTHROID/LEVOTHROID) 150 MCG tablet TAKE ONE TABLET BY MOUTH DAILY  Qty: 90 tablet, Refills: 2    Associated Diagnoses: Hypothyroidism, unspecified type      allopurinol (ZYLOPRIM) 100 MG tablet TAKE ONE TABLET BY MOUTH ONE TIME DAILY  Qty: 90 tablet, Refills: 2    Associated Diagnoses: Acute gouty arthritis      furosemide (LASIX) 20 MG tablet TAKE ONE TABLET BY MOUTH TWICE DAILY  Qty: 180 tablet, Refills: 1    Associated Diagnoses: Lymphedema of both lower extremities      gabapentin (NEURONTIN) 600 MG tablet TAKE 1 TABLET (600 MG) BY MOUTH 3 TIMES DAILY  Qty: 90 tablet, Refills: 2    Associated Diagnoses: PVD (peripheral vascular disease) with claudication (H)      buPROPion (WELLBUTRIN XL) 150 MG 24 hr tablet TAKE  ONE TABLET BY MOUTH EVERY DAY IN THE MORNING  Qty: 30 tablet, Refills: 1    Associated Diagnoses: COPD exacerbation (H)      traZODone (DESYREL) 150 MG tablet Take 1 tablet (150 mg) by mouth At Bedtime  Qty: 90 tablet, Refills: 1    Associated  "Diagnoses: Major depressive disorder, recurrent episode, moderate (H)      calcium citrate-vitamin D (CITRACAL) 315-200 MG-UNIT TABS per tablet Take two tablet at dinner and one tablet at bedtime.      lamoTRIgine (LAMICTAL) 200 MG tablet Take 1 tablet (200 mg) by mouth daily  Qty: 90 tablet, Refills: 3    Associated Diagnoses: Major depressive disorder, recurrent episode, moderate (H)      ARIPiprazole (ABILIFY) 5 MG tablet Take 1.5 tablets (7.5 mg) by mouth At Bedtime  Qty: 180 tablet, Refills: 3    Associated Diagnoses: Major depressive disorder, recurrent episode, moderate (H)      mometasone-formoterol (DULERA) 100-5 MCG/ACT oral inhaler Inhale 2 puffs into the lungs 2 times daily  Qty: 13 g, Refills: 11    Associated Diagnoses: Mild persistent asthma without complication      gabapentin (NEURONTIN) 300 MG capsule Take 1 capsule (300 mg) by mouth At Bedtime Take with 600mg at bedtime. For total dose of 600mg in am 600 at noon and 900mg at bedtime  Qty: 90 capsule, Refills: 3    Associated Diagnoses: Major depressive disorder, recurrent episode, moderate (H); Restless leg syndrome      simvastatin (ZOCOR) 20 MG tablet TAKE ONE TABLET BY MOUTH AT BEDTIME  Qty: 90 tablet, Refills: 3    Associated Diagnoses: Hypercholesteremia      !! order for DME Equipment being ordered: CPAP  AIRSENSE 10  5-18 CM H20  SN# [id number]   DN# [id number]      acetaminophen (TYLENOL) 500 MG tablet Take 500-1,000 mg by mouth every 6 hours as needed for mild pain      EPINEPHrine 0.3 MG/0.3ML injection Inject 0.3 mLs (0.3 mg) into the muscle once as needed for anaphylaxis  Qty: 0.6 mL, Refills: 0    Associated Diagnoses: Anaphylactic reaction to bee sting, undetermined intent, subsequent encounter      !! order for DME Equipment being ordered: [id number]\" x2pair     \"20-30mmHg Farrow Hybrid Liners\" x 2 pair  Qty: 2 Units, Refills: 11    Associated Diagnoses: Peripheral edema      !! order for DME Equipment being ordered: DEPENDS SIZE " LARGE  Qty: 60 Month, Refills: 5    Associated Diagnoses: Mixed incontinence      !! order for DME Equipment being ordered: INCONTINENCE PADS   QID  Qty: 1 Month, Refills: 11    Associated Diagnoses: Other urinary incontinence       !! - Potential duplicate medications found. Please discuss with provider.      STOP taking these medications       warfarin (COUMADIN) 5 MG tablet Comments:   Reason for Stopping:         ASPIRIN NOT PRESCRIBED, INTENTIONAL, Comments:   Reason for Stopping:             Allergies   Allergies   Allergen Reactions     Darvocet [Propoxyphene N-Apap] Anaphylaxis     Darvocet,Percocet      Percocet [Codeine] Anaphylaxis     Percocet [Oxycodone-Acetaminophen] Swelling, Anaphylaxis and Hives     Asa [Aspirin] Hives     Aspirin causes seizures and hives, throat swelling       Aspirin Hives     Bee      Lyrica [Pregabalin] Other (See Comments) and Swelling     dizziness     Iodine Rash     Reaction to topical betadine     Povidone Iodine Rash     Reaction to topical betadine     Tape [Adhesive Tape] Rash           SURGERY GENERAL IP CONSULT    Significant Results and Procedures   Procedures    None    Data   Results for orders placed or performed during the hospital encounter of 10/18/17   CT Abdomen Pelvis w Contrast    Narrative    CT ABDOMEN PELVIS WITH CONTRAST 10/18/2017 10:51 PM    TECHNIQUE: Images from diaphragm to pubic symphysis 100 mL Isovue-370  IV contrast.  Radiation dose for this scan was reduced using automated exposure  control, adjustment of the mA and/or kV according to patient size, or  iterative reconstruction technique.    HISTORY: Generalized abdominal pain, bright red blood per rectum    COMPARISON: 1/18/2014 CT abdomen pelvis    FINDINGS:   Abdomen and Pelvis: Diffuse fatty liver. No focal liver lesions.  Normal-appearing gallbladder, spleen, pancreas, adrenal glands.  Subcentimeter centimeter probable cysts in both kidneys identified and  similar to 1/18/2014. 2 mm  nonobstructing lower pole right kidney  stone.    No periaortic or pelvic adenopathy. Stent in the left common iliac and  external iliac vein. No free fluid. No acute bowel abnormality. Normal  appendix.    On series 2 image 67 there is a 1 cm fat attenuation nodule anterior  to the sigmoid colon without adjacent fat stranding. This was probably  present on image 105 of the 2014 January 18 CT and could be residual  from a epiploic appendage. Currently this barely abuts the sigmoid  colon on image 67. There is a second subtle fatty nodule along the  anterior mid descending colon series 2 image 44 which could be a  subtle area of epiploic appendagitis.      Impression    IMPRESSION :  1. Fatty liver.  2. No CT evidence for acute colitis or diverticulitis. 2 fatty nodules  adjacent to the mid descending and sigmoid colon could be related to  epiploic appendages, may be very mild epiploic appendagitis along the  anterior mid descending colon.    JANES ARRIAGA MD   XR Chest 2 Views    Narrative    CHEST TWO VIEWS  10/19/2017 1:12 PM     INDICATION: Cough, dyspnea.    COMPARISON: 2/1/2017.      Impression    IMPRESSION: Again seen is cardiomegaly and mild interstitial  prominence suggesting pulmonary vascular congestion. No new focal  airspace disease or significant change.    JEAN WATERS MD     *Note: Due to a large number of results and/or encounters for the requested time period, some results have not been displayed. A complete set of results can be found in Results Review.       History of Present Illness   (From H&P) Bia Bill is a 51 year old female with history of DVT on warfarin, erythrocytosis, GILES, COPD, CVA, tobacco use, depression with anxiety and bipolar depression who [facility]    Hospital Course   Bia Bill was admitted on 10/18/2017.  The following problems were addressed during her hospitalization:    Bia Bill is a 51 year  old female with history of DVT on warfarin, erythrocytosis, GILES, COPD, CVA, tobacco use, depression with anxiety and bipolar depression who presents with multiple episode of abdominal pain with BRBPR.  Her hemoglobin was not significantly decreased.  Consultation with general surgery over the phone was done and it was advised that no endoscopy is needed this time.  Patient was hemodynamically stable with bleeding stopped and she was discharged home she was told to follow-up with primary care physician with CBC.     Pending Results   Unresulted Labs Ordered in the Past 30 Days of this Admission     Date and Time Order Name Status Description    10/19/2017 1315 Urine Culture Aerobic Bacterial Preliminary           Physical Exam   Temp:  [97.6  F (36.4  C)-98.5  F (36.9  C)] 98.2  F (36.8  C)  Heart Rate:  [67-77] 77  Resp:  [16-18] 16  BP: (121-152)/(64-77) 152/64  SpO2:  [95 %-96 %] 95 %  Vitals:    10/18/17 2122 10/19/17 0111   Weight: 100.7 kg (222 lb) 102.9 kg (226 lb 14.4 oz)       Constitutional: Not in any acute distress  CV: S1 S2 Regular  Respiratory: Clear to auscultation bilaterally  GI: Soft, nontender  CNS: Alert and Oriented x 3       The discharge plan was discussed with the patient     Total time on this discharge was 30 minutes.    Braulio Godoy   Hospitalist. [facility]. [place].

## 2017-10-20 NOTE — PROGRESS NOTES
Patient complains of achiness in abdomen stated no BM since 17th, given MILK OF MAGNESIA  Encouraged patient to ambulate.  Continue to monitor.

## 2017-10-20 NOTE — PROGRESS NOTES
[place] [facility] DISCHARGE NOTE    Patient discharged to home at 545 PM via wheel chair. Accompanied by significant other and staff. Discharge instructions reviewed with patient, opportunity offered to ask questions. Prescriptions sent to patients preferred pharmacy. All belongings sent with patient.    Favian Orellana

## 2017-10-20 NOTE — PROGRESS NOTES
No stools overnight. Urine continues to be cloudy karen odorous. Up independently in room denies any pain at this time.

## 2017-10-21 LAB
BACTERIA SPEC CULT: ABNORMAL
BACTERIA SPEC CULT: ABNORMAL
Lab: ABNORMAL
SPECIMEN SOURCE: ABNORMAL

## 2017-10-24 ENCOUNTER — ANTICOAGULATION THERAPY VISIT (OUTPATIENT)
Dept: ANTICOAGULATION | Facility: CLINIC | Age: 52
End: 2017-10-24
Payer: COMMERCIAL

## 2017-10-24 ENCOUNTER — OFFICE VISIT (OUTPATIENT)
Dept: PSYCHOLOGY | Facility: CLINIC | Age: 52
End: 2017-10-24
Payer: COMMERCIAL

## 2017-10-24 DIAGNOSIS — F31.62 MODERATE MIXED BIPOLAR I DISORDER (H): Primary | ICD-10-CM

## 2017-10-24 DIAGNOSIS — Z79.01 LONG TERM CURRENT USE OF ANTICOAGULANT THERAPY: ICD-10-CM

## 2017-10-24 DIAGNOSIS — I73.9 PVD (PERIPHERAL VASCULAR DISEASE) WITH CLAUDICATION (H): ICD-10-CM

## 2017-10-24 PROCEDURE — 90834 PSYTX W PT 45 MINUTES: CPT | Performed by: PSYCHOLOGIST

## 2017-10-24 PROCEDURE — 99207 ZZC NO CHARGE NURSE ONLY: CPT

## 2017-10-24 NOTE — MR AVS SNAPSHOT
MRN:[id number]                      After Visit Summary   10/24/2017    Bia Bill    MRN: [id number]           Visit Information        Provider Department      10/24/2017 10:30 AM Kd Morris [facility] Generic      Your next 10 appointments already scheduled     Nov 07, 2017   Procedure with Haim Lynch MD   [facility] ([facility])    [address] [address]   [phone]           The medical center is located at [address] (between [address] in [place], [place] north of [place]).            Nov 08, 2017 11:00 AM CST   [facility]d [facility]ong MD   [facility]    [address] [address]   [phone]            Nov 17, 2017  1:30 PM CST   LAB with [facility] ([facility])    [address] [address]   [phone]           Patient must bring picture ID. Patient should be prepared to give a urine specimen  Please do not eat 10-12 hours before your appointment if you are coming in fasting for labs on lipids, cholesterol, or glucose (sugar). Pregnant women should follow their Care Team instructions. Water with medications is okay. Do not drink coffee or other fluids. If you have concerns about taking  your medications, please ask at office or if scheduling via Porterohart, send a message by clicking on Secure Messaging, Message Your Care Team.            Nov 21, 2017  9:30 AM CST   Return Visit with Kd Morris   [facility] ([facility])    [address] [address]   [phone]            Dec 15, 2017  1:30 PM CST   LAB with [facility] ([facility])    [address] [address]   [phone]           Patient must bring picture ID. Patient should be prepared to  give a urine specimen  Please do not eat 10-12 hours before your appointment if you are coming in fasting for labs on lipids, cholesterol, or glucose (sugar). Pregnant women should follow their Care Team instructions. Water with medications is okay. Do not drink coffee or other fluids. If you have concerns about taking  your medications, please ask at office or if scheduling via Biodel, send a message by clicking on Secure Messaging, Message Your Care Team.            Dec 15, 2017  1:35 PM CST   LAB with [facility] ([facility])    [address] [address]   [phone]           Patient must bring picture ID. Patient should be prepared to give a urine specimen  Please do not eat 10-12 hours before your appointment if you are coming in fasting for labs on lipids, cholesterol, or glucose (sugar). Pregnant women should follow their Care Team instructions. Water with medications is okay. Do not drink coffee or other fluids. If you have concerns about taking  your medications, please ask at office or if scheduling via Biodel, send a message by clicking on Secure Messaging, Message Your Care Team.            Jan 12, 2018  1:30 PM CST   LAB with [facility] ([facility])    [address] [address]   [phone]           Patient must bring picture ID. Patient should be prepared to give a urine specimen  Please do not eat 10-12 hours before your appointment if you are coming in fasting for labs on lipids, cholesterol, or glucose (sugar). Pregnant women should follow their Care Team instructions. Water with medications is okay. Do not drink coffee or other fluids. If you have concerns about taking  your medications, please ask at office or if scheduling via Biodel, send a message by clicking on Secure Messaging, Message Your Care Team.            Feb 09, 2018  1:30 PM CST   LAB with [facility]  [facility] ([facility])    [address] [address]   [phone]           Patient must bring picture ID. Patient should be prepared to give a urine specimen  Please do not eat 10-12 hours before your appointment if you are coming in fasting for labs on lipids, cholesterol, or glucose (sugar). Pregnant women should follow their Care Team instructions. Water with medications is okay. Do not drink coffee or other fluids. If you have concerns about taking  your medications, please ask at office or if scheduling via myTomorrows, send a message by clicking on Secure Messaging, Message Your Care Team.            Mar 16, 2018  1:30 PM CDT   LAB with [facility] ([facility])    [address] [address]   [phone]           Patient must bring picture ID. Patient should be prepared to give a urine specimen  Please do not eat 10-12 hours before your appointment if you are coming in fasting for labs on lipids, cholesterol, or glucose (sugar). Pregnant women should follow their Care Team instructions. Water with medications is okay. Do not drink coffee or other fluids. If you have concerns about taking  your medications, please ask at office or if scheduling via myTomorrows, send a message by clicking on Secure Messaging, Message Your Care Team.            Mar 16, 2018  1:35 PM CDT   LAB with [place] [facility]   [facility] ([facility])    [address] [address]   [phone]           Patient must bring picture ID. Patient should be prepared to give a urine specimen  Please do not eat 10-12 hours before your appointment if you are coming in fasting for labs on lipids, cholesterol, or glucose (sugar). Pregnant women should follow their Care Team instructions. Water with medications is okay. Do not drink coffee or other fluids. If you have concerns about taking  your medications, please ask at  "office or if scheduling via Avolent, send a message by clicking on Secure Messaging, Message Your Care Team.              Avolent Information     Avolent lets you send messages to your doctor, view your test results, renew your prescriptions, schedule appointments and more. To sign up, go to [email or web address] . Click on \"Log in\" on the left side of the screen, which will take you to the Welcome page. Then click on \"Sign up Now\" on the right side of the page.     You will be asked to enter the access code listed below, as well as some personal information. Please follow the directions to create your username and password.     Your access code is: [email or web address]  Expires: 2018 12:08 AM     Your access code will  in 90 days. If you need help or a new code, please call your [place] clinic or [phone].        Care EveryWhere ID     This is your Care EveryWhere ID. This could be used by other organizations to access your [place] medical records  [id number]        Equal Access to Services     [facility]: Hadii jose Clark, waaxda luporsche, qaybta kaaljose c clay, nitin roberts . So [facility] [phone].    ATENCIÓN: Si habla español, tiene a terry disposición servicios gratuitos de asistencia lingüística. Llame al [phone].    We comply with applicable federal civil rights laws and [place] laws. We do not discriminate on the basis of race, color, national origin, age, disability, sex, sexual orientation, or gender identity.            "

## 2017-10-24 NOTE — MR AVS SNAPSHOT
Bia S Bill   10/24/2017   Anticoagulation Therapy Visit    Description:  51 year old female   Provider:  Mireille Torres, RN   Department:  [place] [facility]           INR as of 10/24/2017     Today's INR No new INR was available at the time of this encounter.      Anticoagulation Summary as of 10/24/2017     INR goal 2.0-3.0   Today's INR No new INR was available at the time of this encounter.   Full instructions 10/24: Hold; Otherwise 10 mg every day   Next INR check 10/25/2017    Indications   Long term current use of anticoagulant therapy [Z79.01]  DVT (deep venous thrombosis) (H) [I82.409]  PVD (peripheral vascular disease) with claudication (H) [I73.9]         October 2017 Details    Sun Mon Tue Wed Thu Fri Sat     1               2               3               4               5               6               7                 8               9               10               11               12               13               14                 15               16               17               18               19               20               21                 22               23               24      Hold   See details      25            26               27               28                 29               30               31                    Date Details   10/24 This INR check       Date of next INR:  10/25/2017         How to take your warfarin dose     To take:  10 mg Take 2 of the 5 mg tablets.    Hold Do not take your warfarin dose. See the Details table to the right for additional instructions.

## 2017-10-25 ENCOUNTER — OFFICE VISIT (OUTPATIENT)
Dept: FAMILY MEDICINE | Facility: CLINIC | Age: 52
End: 2017-10-25
Payer: COMMERCIAL

## 2017-10-25 ENCOUNTER — TELEPHONE (OUTPATIENT)
Dept: FAMILY MEDICINE | Facility: CLINIC | Age: 52
End: 2017-10-25

## 2017-10-25 VITALS
HEART RATE: 78 BPM | TEMPERATURE: 98.8 F | OXYGEN SATURATION: 98 % | WEIGHT: 219 LBS | HEIGHT: 59 IN | DIASTOLIC BLOOD PRESSURE: 62 MMHG | SYSTOLIC BLOOD PRESSURE: 135 MMHG | BODY MASS INDEX: 44.15 KG/M2

## 2017-10-25 DIAGNOSIS — K62.5 BRBPR (BRIGHT RED BLOOD PER RECTUM): Primary | ICD-10-CM

## 2017-10-25 DIAGNOSIS — R10.84 ABDOMINAL PAIN, GENERALIZED: ICD-10-CM

## 2017-10-25 LAB
BASOPHILS # BLD AUTO: 0 10E9/L (ref 0–0.2)
BASOPHILS NFR BLD AUTO: 0.3 %
DIFFERENTIAL METHOD BLD: ABNORMAL
EOSINOPHIL # BLD AUTO: 0.1 10E9/L (ref 0–0.7)
EOSINOPHIL NFR BLD AUTO: 1.2 %
ERYTHROCYTE [DISTWIDTH] IN BLOOD BY AUTOMATED COUNT: 22.7 % (ref 10–15)
HCT VFR BLD AUTO: 60.5 % (ref 35–47)
HGB BLD-MCNC: 19.4 G/DL (ref 11.7–15.7)
IMM GRANULOCYTES # BLD: 0 10E9/L (ref 0–0.4)
IMM GRANULOCYTES NFR BLD: 0.3 %
LYMPHOCYTES # BLD AUTO: 1.8 10E9/L (ref 0.8–5.3)
LYMPHOCYTES NFR BLD AUTO: 28 %
MCH RBC QN AUTO: 28.7 PG (ref 26.5–33)
MCHC RBC AUTO-ENTMCNC: 32.1 G/DL (ref 31.5–36.5)
MCV RBC AUTO: 89 FL (ref 78–100)
MONOCYTES # BLD AUTO: 0.4 10E9/L (ref 0–1.3)
MONOCYTES NFR BLD AUTO: 6.2 %
NEUTROPHILS # BLD AUTO: 4.2 10E9/L (ref 1.6–8.3)
NEUTROPHILS NFR BLD AUTO: 64 %
PLATELET # BLD AUTO: 104 10E9/L (ref 150–450)
RBC # BLD AUTO: 6.77 10E12/L (ref 3.8–5.2)
WBC # BLD AUTO: 6.5 10E9/L (ref 4–11)

## 2017-10-25 PROCEDURE — 80053 COMPREHEN METABOLIC PANEL: CPT | Performed by: FAMILY MEDICINE

## 2017-10-25 PROCEDURE — 36415 COLL VENOUS BLD VENIPUNCTURE: CPT | Performed by: FAMILY MEDICINE

## 2017-10-25 PROCEDURE — 99214 OFFICE O/P EST MOD 30 MIN: CPT | Performed by: FAMILY MEDICINE

## 2017-10-25 PROCEDURE — 85025 COMPLETE CBC W/AUTO DIFF WBC: CPT | Performed by: FAMILY MEDICINE

## 2017-10-25 NOTE — NURSING NOTE
"Chief Complaint   Patient presents with     Hospital F/U     10/18-10/20       Initial /62 (BP Location: Right arm, Cuff Size: Adult Large)  Pulse 78  Temp 98.8  F (37.1  C) (Tympanic)  Ht 4' 10.5\" (1.486 m)  Wt 219 lb (99.3 kg)  LMP 09/27/2009  SpO2 98%  BMI 44.99 kg/m2 Estimated body mass index is 44.99 kg/(m^2) as calculated from the following:    Height as of this encounter: 4' 10.5\" (1.486 m).    Weight as of this encounter: 219 lb (99.3 kg).  Medication Reconciliation: complete   Shelley Lou / Certified Medical Assistant......10/25/2017 2:50 PM    "

## 2017-10-25 NOTE — MR AVS SNAPSHOT
After Visit Summary   10/25/2017    Bia Bill    MRN: [id number]           Patient Information     Date Of Birth          1965        Visit Information        Provider Department      10/25/2017 2:40 PM Kd Leong MD [facility]        Today's Diagnoses     BRBPR (bright red blood per rectum)    -  1    Abdominal pain, generalized           Follow-ups after your visit        Additional Services     GASTROENTEROLOGY ADULT REF PROCEDURE ONLY       Last Lab Result: Creatinine (mg/dL)       Date                     Value                 10/20/2017               0.81             ----------  Body mass index is 44.99 kg/(m^2).      Patient will be contacted to schedule procedure.     Please be aware that coverage of these services is subject to the terms and limitations of your health insurance plan.  Call member services at your health plan with any benefit or coverage questions.  Any procedures must be performed at a [facility] facility OR coordinated by your clinic's referral office.    Please bring the following with you to your appointment:    (1) Any X-Rays, CTs or MRIs which have been performed.  Contact the facility where they were done to arrange for  prior to your scheduled appointment.    (2) List of current medications   (3) This referral request   (4) Any documents/labs given to you for this referral                  Your next 10 appointments already scheduled     Nov 07, 2017   Procedure with Haim Lynch MD   [facility] ([facility])    [address] [place] [address]   [phone]           The medical center is located at [address] [place] [address]. ([facility] [address] [facility] [place], [place] north of [place]).            Nov 08, 2017 11:00 AM CST   SHORT with Kd Leong MD   [facility] ([facility])    [address] [address]   [phone]            Nov 17, 2017  1:30  PM CST   LAB with [facility] ([facility])    [address] [address]   [phone]           Please do not eat 10-12 hours before your appointment if you are coming in fasting for labs on lipids, cholesterol, or glucose (sugar). This does not apply to pregnant women. Water, hot tea and black coffee (with nothing added) are okay. Do not drink other fluids, diet soda or chew gum.            Nov 21, 2017  9:30 AM CST   Return Visit with Kd Morris   [facility] ([facility])    [address] [address]   [phone]            Dec 15, 2017  1:30 PM CST   LAB with [facility] ([facility])    [address] [address]   [phone]           Please do not eat 10-12 hours before your appointment if you are coming in fasting for labs on lipids, cholesterol, or glucose (sugar). This does not apply to pregnant women. Water, hot tea and black coffee (with nothing added) are okay. Do not drink other fluids, diet soda or chew gum.            Dec 15, 2017  1:35 PM CST   LAB with [facility] ([facility])    [address] [address]   [phone]           Please do not eat 10-12 hours before your appointment if you are coming in fasting for labs on lipids, cholesterol, or glucose (sugar). This does not apply to pregnant women. Water, hot tea and black coffee (with nothing added) are okay. Do not drink other fluids, diet soda or chew gum.            Jan 12, 2018  1:30 PM CST   LAB with [facility] ([facility])    [address] [address]   [phone]           Please do not eat 10-12 hours before your appointment if you are coming in fasting for labs on lipids, cholesterol, or glucose (sugar). This does not apply to pregnant women. Water, hot tea  and black coffee (with nothing added) are okay. Do not drink other fluids, diet soda or chew gum.            Feb 09, 2018  1:30 PM CST   LAB with [place] [facility]   [facility]    [address] [facility] [address]   [phone]           Please do not eat 10-12 hours before your appointment if you are coming in fasting for labs on lipids, cholesterol, or glucose (sugar). This does not apply to pregnant women. Water, hot tea and black coffee (with nothing added) are okay. Do not drink other fluids, diet soda or chew gum.            Mar 16, 2018  1:30 PM CDT   LAB with [facility] [facility]   [facility]    [address] [facility] [address]   [phone]           Please do not eat 10-12 hours before your appointment if you are coming in fasting for labs on lipids, cholesterol, or glucose (sugar). This does not apply to pregnant women. Water, hot tea and black coffee (with nothing added) are okay. Do not drink other fluids, diet soda or chew gum.            Mar 16, 2018  1:35 PM CDT   LAB with [facility] [facility]   [facility]    [address] [facility] [address]   [phone]           Please do not eat 10-12 hours before your appointment if you are coming in fasting for labs on lipids, cholesterol, or glucose (sugar). This does not apply to pregnant women. Water, hot tea and black coffee (with nothing added) are okay. Do not drink other fluids, diet soda or chew gum.              Who to contact     Normal or non-critical lab and imaging results will be communicated to you by iGrez LLChart, letter or phone within 4 business days after the clinic has received the results. If you do not hear from us within 7 days, please contact the clinic through iGrez LLChart or phone. If you have a critical or abnormal lab result, we will notify you by phone as soon as possible.  Submit refill requests through Vyu or  "call your pharmacy and they will forward the refill request to us. Please allow 3 business days for your refill to be completed.          If you need to speak with a  for additional information , please call: [phone]             Additional Information About Your Visit        [facility] Information     mobiManagehart lets you send messages to your doctor, view your test results, renew your prescriptions, schedule appointments and more. To sign up, go to [email or web address] . Click on \"Log in\" on the left side of the screen, which will take you to the Welcome page. Then click on \"Sign up Now\" on the right side of the page.     You will be asked to enter the access code listed below, as well as some personal information. Please follow the directions to create your username and password.     Your access code is: [email or web address]  Expires: 2018 12:08 AM     Your access code will  in 90 days. If you need help or a new code, please call your [place] clinic or [phone].        Care EveryWhere ID     This is your Care EveryWhere ID. This could be used by other organizations to access your [place] medical records  [id number]        Your Vitals Were     Pulse Temperature Height Last Period Pulse Oximetry BMI (Body Mass Index)    78 98.8  F (37.1  C) (Tympanic) 4' 10.5\" (1.486 m) 2009 98% 44.99 kg/m2       Blood Pressure from Last 3 Encounters:   10/25/17 135/62   10/20/17 152/64   10/17/17 127/68    Weight from Last 3 Encounters:   10/25/17 219 lb (99.3 kg)   10/19/17 226 lb 14.4 oz (102.9 kg)   10/17/17 222 lb 6.4 oz (100.9 kg)              We Performed the Following     CBC with platelets differential     Comprehensive metabolic panel (BMP + Alb, Alk Phos, ALT, AST, Total. Bili, TP)     GASTROENTEROLOGY ADULT REF PROCEDURE ONLY        Primary Care Provider Office Phone # Fax #    Kd Leong [phone]       [facility] [address] [facility] " [phone]        Equal Access to Services     [facility]: Hadii jose zamora kristal Clark, wahoada luqadaha, qaybta kaannmarie jasonkieranfrancis, nitin clare agustinestebanantelmo roberts . So [facility] [phone].    ATENCIÓN: Si habla español, tiene a terry disposición servicios gratuitos de asistencia lingüística. Carlosame al [phone].    We comply with applicable federal civil rights laws and [place] laws. We do not discriminate on the basis of race, color, national origin, age, disability, sex, sexual orientation, or gender identity.            Thank you!     Thank you for choosing [facility]  for your care. Our goal is always to provide you with excellent care. Hearing back from our patients is one way we can continue to improve our services. Please take a few minutes to complete the written survey that you may receive in the mail after your visit with us. Thank you!             Your Updated Medication List - Protect others around you: Learn how to safely use, store and throw away your medicines at www.disposemymeds.org.          This list is accurate as of: 10/25/17 11:59 PM.  Always use your most recent med list.                   Brand Name Dispense Instructions for use Diagnosis    acetaminophen 500 MG tablet    TYLENOL     Take 500-1,000 mg by mouth every 6 hours as needed for mild pain        * albuterol 108 (90 BASE) MCG/ACT Inhaler    PROAIR HFA/PROVENTIL HFA/VENTOLIN HFA    3 Inhaler    Inhale 2 puffs into the lungs every 6 hours as needed for shortness of breath / dyspnea or wheezing    Mild persistent asthma without complication       * albuterol (2.5 MG/3ML) 0.083% neb solution     1 Box    Take 1 vial (2.5 mg) by nebulization every 6 hours as needed for shortness of breath / dyspnea or wheezing    Mild persistent asthma without complication       allopurinol 100 MG tablet    ZYLOPRIM    90 tablet    TAKE ONE TABLET BY MOUTH ONE TIME DAILY    Acute gouty arthritis       ARIPiprazole 5 MG tablet    ABILIFY     180 tablet    Take 1.5 tablets (7.5 mg) by mouth At Bedtime    Major depressive disorder, recurrent episode, moderate (H)       buPROPion 150 MG 24 hr tablet    WELLBUTRIN XL    30 tablet    TAKE  ONE TABLET BY MOUTH EVERY DAY IN THE MORNING    COPD exacerbation (H)       calcium citrate-vitamin D 315-200 MG-UNIT Tabs per tablet    CITRACAL     Take two tablet at dinner and one tablet at bedtime.        cefdinir 300 MG capsule    OMNICEF    18 capsule    Take 1 capsule (300 mg) by mouth 2 times daily for 9 days    Acute UTI       EPINEPHrine 0.3 MG/0.3ML injection 2-pack    EPIPEN/ADRENACLICK/or ANY BX GENERIC EQUIV    0.6 mL    Inject 0.3 mLs (0.3 mg) into the muscle once as needed for anaphylaxis    Anaphylactic reaction to bee sting, undetermined intent, subsequent encounter       furosemide 20 MG tablet    LASIX    180 tablet    TAKE ONE TABLET BY MOUTH TWICE DAILY    Lymphedema of both lower extremities       * gabapentin 300 MG capsule    NEURONTIN    90 capsule    Take 1 capsule (300 mg) by mouth At Bedtime Take with 600mg at bedtime. For total dose of 600mg in am 600 at noon and 900mg at bedtime    Major depressive disorder, recurrent episode, moderate (H), Restless leg syndrome       * gabapentin 600 MG tablet    NEURONTIN    90 tablet    TAKE 1 TABLET (600 MG) BY MOUTH 3 TIMES DAILY    PVD (peripheral vascular disease) with claudication (H)       lamoTRIgine 200 MG tablet    LaMICtal    90 tablet    Take 1 tablet (200 mg) by mouth daily    Major depressive disorder, recurrent episode, moderate (H)       levothyroxine 150 MCG tablet    SYNTHROID/LEVOTHROID    90 tablet    TAKE ONE TABLET BY MOUTH DAILY    Hypothyroidism, unspecified type       mometasone-formoterol 100-5 MCG/ACT oral inhaler    DULERA    13 g    Inhale 2 puffs into the lungs 2 times daily    Mild persistent asthma without complication       omeprazole 20 MG CR capsule    priLOSEC    180 capsule    TAKE 1 CAPSULE (20 MG) BY MOUTH 2 TIMES  "DAILY    Gastroesophageal reflux disease without esophagitis       * order for DME      Equipment being ordered: CPAP AIRSENSE 10 5-18 CM H20 # [id number]   DN# [id number]        * order for DME     1 Month    Equipment being ordered: INCONTINENCE PADS  QID    Other urinary incontinence       * order for DME     60 Month    Equipment being ordered: DEPENDS SIZE LARGE    Mixed incontinence       * order for DME     2 Units    Equipment being ordered: [id number]\" x2pair   \"20-30mmHg Farrow Hybrid Liners\" x 2 pair    Peripheral edema       simvastatin 20 MG tablet    ZOCOR    90 tablet    TAKE ONE TABLET BY MOUTH AT BEDTIME    Hypercholesteremia       traZODone 150 MG tablet    DESYREL    90 tablet    Take 1 tablet (150 mg) by mouth At Bedtime    Major depressive disorder, recurrent episode, moderate (H)       VITAMIN D (CHOLECALCIFEROL) PO      Take 1,000 Units by mouth daily        * Notice:  This list has 8 medication(s) that are the same as other medications prescribed for you. Read the directions carefully, and ask your doctor or other care provider to review them with you.      "

## 2017-10-25 NOTE — PROGRESS NOTES
SUBJECTIVE:   Bia Bill is a 51 year old female who presents to clinic today for the following health issues:      Hospital Follow-up Visit:    Hospital/Nursing Home/IP Rehab Facility: [facility]  Date of Admission: 10/18/17  Date of Discharge: 10/20/17  Reason(s) for Admission: multiple issues- primary Dx: acute UTI            Problems taking medications regularly:  None       Medication changes since discharge: cefdinir       Problems adhering to non-medication therapy:  None    Summary of hospitalization:  [facility] discharge summary reviewed  Diagnostic Tests/Treatments reviewed.  Follow up needed: none  Other Healthcare Providers Involved in Patient s Care:         None  Update since discharge: improved.     Post Discharge Medication Reconciliation: discharge medications reconciled, continue medications without change.  Plan of care communicated with patient     Coding guidelines for this visit:  Type of Medical   Decision Making Face-to-Face Visit       within 7 Days of discharge Face-to-Face Visit        within 14 days of discharge   Moderate Complexity [id number]   High Complexity 15305 28105          Problem list and histories reviewed & adjusted, as indicated.  Additional history: as documented    Patient Active Problem List   Diagnosis     Mild persistent asthma     Polyneuropathy in other diseases classified elsewhere (H)     DVT (deep venous thrombosis) (H)     Alcohol abuse, in remission     SECONDARY POLYCYTHEMIA     Chondromalacia of patella     Sensorineural hearing loss, asymmetrical     Developmental reading disorder     Esophageal reflux     Hypothyroidism     Fatty liver     GILES (Obstructive Sleep Apnea)-Moderate (AHI 16)     RLS (restless legs syndrome)     CARDIOVASCULAR SCREENING; LDL GOAL LESS THAN 100     Smoker     Erythrocytosis     Moderate mixed bipolar I disorder (H)     Personality disorder, depressive     COPD (chronic obstructive pulmonary disease)  (H)     Current use of long term anticoagulation     Rosacea     Health Care Home     DDD (degenerative disc disease), cervical     Benign neoplasm of colon (POLYPOSIS)     Stroke (H)     Somatization disorder     Sebaceous cyst of right axilla     HTN, goal below 140/90     Left leg pain     Vitamin D deficiency     Long term current use of anticoagulant therapy     Morbid obesity (H)     PVD (peripheral vascular disease) with claudication (H)     Cyst of left ovary     Morbid obesity with alveolar hypoventilation (H)     GI bleed     Severe episode of recurrent major depressive disorder (H)     Past Surgical History:   Procedure Laterality Date     BILIARY STENT SINGLE USE COV      3 stints in left leg     BONE MARROW BIOPSY, BONE SPECIMEN, NEEDLE/TROCAR N/A 11/17/2014    Procedure: BIOPSY BONE MARROW;  Surgeon: Hay Aaron MD;  Location: [facility]     D & C  10/26/09    with uterine ablation     ESOPHAGOSCOPY, GASTROSCOPY, DUODENOSCOPY (EGD), COMBINED  4/21/2014    Procedure: Gastroscopy;  Surgeon: Moris Thomas MD;  Location: [facility]     HYSTERECTOMY, PAP NO LONGER INDICATED  1-4-2010     LITHOTRIPSY  2004    Lithotrypsy       Social History   Substance Use Topics     Smoking status: Current Every Day Smoker     Packs/day: 0.50     Years: 34.00     Types: Cigarettes     Smokeless tobacco: Never Used      Comment: started at age 11.  Quit during pregnancyX4.  0.5 ppd (6/2016)     Alcohol use No      Comment: quit 1995     Family History   Problem Relation Age of Onset     Hypertension Mother      DIABETES Mother      Blood Disease Mother      HEART DISEASE Mother      chf     CEREBROVASCULAR DISEASE Mother      Hypertension Father      CANCER Father      lung cancer     Allergies Sister      Depression Sister      Hypertension Sister              Reviewed and updated as needed this visit by clinical staffTobacco  Allergies  Meds  Med Hx  Surg Hx  Fam Hx  Soc Hx      Reviewed and updated as  "needed this visit by Provider         ROS:  Constitutional, HEENT, cardiovascular, pulmonary, gi and gu systems are negative, except as otherwise noted.      OBJECTIVE:   /62 (BP Location: Right arm, Cuff Size: Adult Large)  Pulse 78  Temp 98.8  F (37.1  C) (Tympanic)  Ht 4' 10.5\" (1.486 m)  Wt 219 lb (99.3 kg)  LMP 09/27/2009  SpO2 98%  BMI 44.99 kg/m2  Body mass index is 44.99 kg/(m^2).  GENERAL: healthy, alert and no distress  NECK: no adenopathy, no asymmetry, masses, or scars and thyroid normal to palpation  RESP: lungs clear to auscultation - no rales, rhonchi or wheezes  CV: regular rate and rhythm, normal S1 S2, no S3 or S4, no murmur, click or rub, no peripheral edema and peripheral pulses strong  ABDOMEN: soft, nontender, no hepatosplenomegaly, no masses and bowel sounds normal  MS: no gross musculoskeletal defects noted, no edema  Rectal no sighn of blood  guiac postive  Diagnostic Test Results:  none     ASSESSMENT/PLAN:     1. BRBPR (bright red blood per rectum)  - GASTROENTEROLOGY ADULT REF PROCEDURE ONLY  - CBC with platelets differential    2. Abdominal pain, generalized    - Comprehensive metabolic panel (BMP + Alb, Alk Phos, ALT, AST, Total. Bili, TP)    Kd Leong MD  [facility]  "

## 2017-10-25 NOTE — TELEPHONE ENCOUNTER
ED/UC/IP follow up phone call:    RN please call to follow up.    Number of ED visits in past 12 months = 0/0

## 2017-10-25 NOTE — LETTER
October 30, 2017      Bia Bill  [address]  [place] [address]        Dear ,    We are writing to inform you that your test results are okay.     If you have any questions or concerns, please call the clinic at the number listed above.       Sincerely,    Kd Leong MD                                                        Resulted Orders   CBC with platelets differential   Result Value Ref Range    WBC 6.5 4.0 - 11.0 10e9/L    RBC Count 6.77 (H) 3.8 - 5.2 10e12/L    Hemoglobin 19.4 (H) 11.7 - 15.7 g/dL    Hematocrit 60.5 (H) 35.0 - 47.0 %    MCV 89 78 - 100 fl    MCH 28.7 26.5 - 33.0 pg    MCHC 32.1 31.5 - 36.5 g/dL    RDW 22.7 (H) 10.0 - 15.0 %    Platelet Count 104 (L) 150 - 450 10e9/L    Diff Method Automated Method     % Neutrophils 64.0 %    % Lymphocytes 28.0 %    % Monocytes 6.2 %    % Eosinophils 1.2 %    % Basophils 0.3 %    % Immature Granulocytes 0.3 %    Absolute Neutrophil 4.2 1.6 - 8.3 10e9/L    Absolute Lymphocytes 1.8 0.8 - 5.3 10e9/L    Absolute Monocytes 0.4 0.0 - 1.3 10e9/L    Absolute Eosinophils 0.1 0.0 - 0.7 10e9/L    Absolute Basophils 0.0 0.0 - 0.2 10e9/L    Abs Immature Granulocytes 0.0 0 - 0.4 10e9/L   Comprehensive metabolic panel (BMP + Alb, Alk Phos, ALT, AST, Total. Bili, TP)   Result Value Ref Range    Sodium 136 133 - 144 mmol/L    Potassium 3.8 3.4 - 5.3 mmol/L    Chloride 103 94 - 109 mmol/L    Carbon Dioxide 26 20 - 32 mmol/L    Anion Gap 7 3 - 14 mmol/L    Glucose 108 (H) 70 - 99 mg/dL    Urea Nitrogen 12 7 - 30 mg/dL    Creatinine 0.82 0.52 - 1.04 mg/dL    GFR Estimate 74 >60 mL/min/1.7m2      Comment:      Non  GFR Calc    GFR Estimate If Black 89 >60 mL/min/1.7m2      Comment:       GFR Calc    Calcium 9.1 8.5 - 10.1 mg/dL    Bilirubin Total 0.6 0.2 - 1.3 mg/dL    Albumin 3.7 3.4 - 5.0 g/dL    Protein Total 7.0 6.8 - 8.8 g/dL    Alkaline Phosphatase 114 40 - 150 U/L    ALT 42 0 - 50 U/L    AST 21 0 - 45  U/L

## 2017-10-26 ENCOUNTER — ANTICOAGULATION THERAPY VISIT (OUTPATIENT)
Dept: ANTICOAGULATION | Facility: CLINIC | Age: 52
End: 2017-10-26
Payer: COMMERCIAL

## 2017-10-26 DIAGNOSIS — I73.9 PVD (PERIPHERAL VASCULAR DISEASE) WITH CLAUDICATION (H): ICD-10-CM

## 2017-10-26 DIAGNOSIS — Z79.01 LONG TERM CURRENT USE OF ANTICOAGULANT THERAPY: ICD-10-CM

## 2017-10-26 DIAGNOSIS — I82.409 DVT (DEEP VENOUS THROMBOSIS) (H): ICD-10-CM

## 2017-10-26 LAB
ALBUMIN SERPL-MCNC: 3.7 G/DL (ref 3.4–5)
ALP SERPL-CCNC: 114 U/L (ref 40–150)
ALT SERPL W P-5'-P-CCNC: 42 U/L (ref 0–50)
ANION GAP SERPL CALCULATED.3IONS-SCNC: 7 MMOL/L (ref 3–14)
AST SERPL W P-5'-P-CCNC: 21 U/L (ref 0–45)
BILIRUB SERPL-MCNC: 0.6 MG/DL (ref 0.2–1.3)
BUN SERPL-MCNC: 12 MG/DL (ref 7–30)
CALCIUM SERPL-MCNC: 9.1 MG/DL (ref 8.5–10.1)
CHLORIDE SERPL-SCNC: 103 MMOL/L (ref 94–109)
CO2 SERPL-SCNC: 26 MMOL/L (ref 20–32)
CREAT SERPL-MCNC: 0.82 MG/DL (ref 0.52–1.04)
GFR SERPL CREATININE-BSD FRML MDRD: 74 ML/MIN/1.7M2
GLUCOSE SERPL-MCNC: 108 MG/DL (ref 70–99)
POTASSIUM SERPL-SCNC: 3.8 MMOL/L (ref 3.4–5.3)
PROT SERPL-MCNC: 7 G/DL (ref 6.8–8.8)
SODIUM SERPL-SCNC: 136 MMOL/L (ref 133–144)

## 2017-10-26 PROCEDURE — 99207 ZZC NO CHARGE NURSE ONLY: CPT

## 2017-10-26 NOTE — PROGRESS NOTES
Progress Note  Disclaimer: This note consists of symbols derived from keyboarding, dictation and/or voice recognition software. As a result, there may be errors in the script that have gone undetected. Please consider this when interpreting information found in this chart.    Client Name: Bia Bill  Date:  10/24/2017         Service Type: Individual      Session Start Time:  10:30 AM  Session End Time: 11:15 AM      Session Length: 45     Session #: 19     Attendees: Client attended alone    Treatment Plan Last Reviewed: 10/24/2017  PHQ-9 / CHIN-7 : 10     DATA   Client reported she was hospitalized for gastrointestinal problems last week. She is not sure what is wrong with her, stress may be a significant part. She is pursuing getting rid of her roommate through legal means as the  proved unhelpful. She is engaging in some positive activities, sewing projects for [employer]. She is planning on getting away for the weekend visiting her daughter.     Progress Since Last Session (Related to Symptoms / Goals / Homework):   Symptoms: Worsening    Homework: Achieved / completed to satisfaction      Episode of Care Goals: Satisfactory progress - ACTION (Actively working towards change); Intervened by reinforcing change plan / affirming steps taken     Current / Ongoing Stressors and Concerns:   Recurrent depression  family relational problems, medical problems, chronic pain, trauma history Roommate problems.     Treatment Objective(s) Addressed in This Session:   Increase interest, engagement, and pleasure in doing things  Decrease frequency and intensity of feeling down, depressed, hopeless       Intervention:   CBT: Behavioral activation reviewed safety and provided in encouragement for client's self advocacy.     ASSESSMENT: Current Emotional / Mental Status (status of significant symptoms):   Risk status (Self / Other harm or suicidal  ideation)   Client denies current fears or concerns for personal safety.   Client denies current or recent suicidal ideation or behaviors.   Client denies current or recent homicidal ideation or behaviors.   Client denies current or recent self injurious behavior or ideation.   Client denies other safety concerns.   A safety and risk management plan has not been developed at this time, however client was given the after-hours number / 911 should there be a change in any of these risk factors.     Appearance:   Appropriate    Eye Contact:   Good    Psychomotor Behavior: Normal    Attitude:   Cooperative    Orientation:   All   Speech    Rate / Production: Normal     Volume:  Normal    Mood:    Normal   Affect:    Appropriate    Thought Content:  Clear    Thought Form:  Coherent  Logical    Insight:    Good      Medication Review:   No changes to current psychiatric medication(s)     Medication Compliance:   Yes     Changes in Health Issues:   None reported     Chemical Use Review:   Substance Use: Chemical use reviewed, no active concerns identified      Tobacco Use: Client reports 5 cigarettes per day      Collateral Reports Completed:   Not Applicable    PLAN: (Client Tasks / Therapist Tasks / Other)  Client to continue With her self-care routine and maintenance of boundaries.   Maintain medication compliance and contact with psychiatry  Kd Morris                                                         ________________________________________________________________________    Treatment Plan    Client's Name: Bia Bill  YOB: 1965    Date: 10/24/2017      DSM5 Diagnoses: (Sustained by DSM5 Criteria Listed Above)  Diagnoses: 296.40 Bipolar I Disorder Current or Most Recent Episode Manic, Unspecified  Psychosocial & Contextual Factors: financial difficulties, chronic pain, roommate issues  WHODAS 2.0 (12 item)               This questionnaire asks about difficulties due to health  conditions. Health conditions             include disease or illnesses, other health problems that may be short or long lasting,             injuries, mental health or emotional problems, and problems with alcohol or drugs.                         Think back over the past 30 days and answer these questions, thinking about how much             difficulty you had doing the following activities. For each question, please [place] only             one response.      S1  Standing for long periods such as 30 minutes?  Mild =           2    S2  Taking care of household responsibilities?  Moderate =   3    S3  Learning a new task, for example, learning how to get to a new place?  Mild =           2    S4  How much of a problem do you have joining community activities (for example, festivals, [employer] or other activities) in the same way as anyone else can?  Moderate =   3    S5  How much have you been emotionally affected by your health problems?  Mild =           2        In the past 30 days, how much difficulty did you have in:    S6  Concentrating on doing something for ten minutes?  Mild =           2    S7  Walking a long distance such as a kilometer (or equivalent)?  Severe =       4    S8  Washing your whole body?  None =         1    S9  Getting dressed?  None =         1    S10  Dealing with people you do not know?  Moderate =   3    S11  Maintaining a friendship?  Severe =       4    S12  Your day to day work?  Moderate =   3       H1  Overall, in the past 30 days, how many days were these difficulties present?  Record number of days seven    H2  In the past 30 days, for how many days were you totally unable to carry out your usual activities or work because of any health condition?  Record number of days  seven    H3  In the past 30 days, not counting the days that you were totally unable, for how many days did you cut back or reduce your usual activities or work because of any health condition?  Record number of  days five                   Referral / Collaboration:  Referral to another professional/service is not indicated at this time..    Anticipated number of session or this episode of care: 15    Goals  1. Education- the Biopsychosocial model of depression  a. Client will be able to describe how depression is effecting them physically, emotionally and socially  2. Behavioral Activation  a. Client will learn to assess their depression on a day to day basis  b. Client will Identify two forms of exercise/activity and engage in them 3 times per week  c. Client will Identify 3 things that make them laugh, and engage in these 5 times per week.  d. Client will Identify 1-2Creative activities or hobbies  and engage in them 2 times per week  e. Client will identify music, movies, books that make them feel good and use them 3-4 times per week  3. Self-care  a. Client will identify 5 things they can do just for themselves  b. Client will take time for quiet, reflection, meditation 5 times per week  c. Client will Learn to set boundaries when appropriate  d. Client will Identify 2 individuals they can call on for support, distraction  4. Assessment of progress  a. Client will engage in assessment of their progress on a regular basis    Bipolar Disorder  Treatment plan:  1. Education- the Biopsychosocial model of Bipolar Disorder    a. Client will be able to describe in general terms what Bipolar Disorder  is and is not  b. Client will be able to describe how Bipolar Disorder  has affected their life in at least two different areas, such as school or work and Home/relationships  c. Clients parents/guardians or significant others will be provided information on what Bipolar Disorder  is and the ways it can affect relationships and be encouraged to be a part of clients treatment team.  2. Medication  a. Client will participate in medication evaluation for Bipolar Disorder  symptoms and follow medication recommendations.    3. Identification and management of triggers  a. Client and therapist will examine patient s history to determine if there are predictable triggers for manic or depressive episodes (e.g. boredom, anger, family stress)  b. Client and therapist will develop means of diffusing these triggers (e.g. relaxation strategies, boundary setting, anger management)  4. Comorbid conditions   a. Client and therapist will asses for comorbid conditions (e.g. anxiety, depression, substance use). and add additional items to treatment plan as needed  5. Self-care  a. Client will identify 3 things they can do just for themselves  b. Client will take time for quiet, reflection, meditation 3 times per week  c. Client will Learn to set boundaries when appropriate  d. Client will Identify 2 individuals they can call on for support, distraction  5. Behavioral Activation  a. Client will Identify two forms of exercise/activity and engage in them 3 times per week  b. Client will Identify 2 things that make them laugh, and engage in these 3 times per week.  c. Client will Identify 2 Creative activities or hobbies  and engage in them 2 times per week  d. Client will identify music, movies, books that make them feel good and use them 3 times per week  6. Assessment of progress  a. Client will engage in assessment of their progress on a regular basis    Client has reviewed and agreed to the above plan.      Kd Morris  10/24/2017

## 2017-10-30 NOTE — TELEPHONE ENCOUNTER
Hospital/TCU/ED for chronic condition Discharge Protocol  Chart indicates that patient was seen in the clinic by Dr. Leong on 10/25/17.  Mundo Conde RN

## 2017-11-05 ENCOUNTER — ANESTHESIA EVENT (OUTPATIENT)
Dept: GASTROENTEROLOGY | Facility: CLINIC | Age: 52
End: 2017-11-05
Payer: COMMERCIAL

## 2017-11-05 ASSESSMENT — LIFESTYLE VARIABLES: TOBACCO_USE: 1

## 2017-11-05 ASSESSMENT — COPD QUESTIONNAIRES: COPD: 1

## 2017-11-05 NOTE — ANESTHESIA PREPROCEDURE EVALUATION
Anesthesia Evaluation     . Pt has had prior anesthetic.            ROS/MED HX    ENT/Pulmonary:     (+)sleep apnea, tobacco use, Current use Mild Persistent asthma COPD, , . .    Neurologic: Comment: RLS    (+)neuropathy CVA     Cardiovascular:     (+) hypertension----. Taking blood thinners : . . . :. .       METS/Exercise Tolerance:     Hematologic:     (+) History of blood clots Other Hematologic Disorder-Polycythemia      Musculoskeletal:         GI/Hepatic: Comment: GI Bleed  ETOH abuse in remission    (+) GERD liver disease (Fatty liver),       Renal/Genitourinary:         Endo:     (+) thyroid problem hypothyroidism, Obesity (Morbid), .      Psychiatric:     (+) psychiatric history depression, bipolar and anxiety      Infectious Disease:         Malignancy:         Other:                     Physical Exam  Normal systems: cardiovascular, pulmonary and dental    Airway   Mallampati: I  TM distance: >3 FB  Neck ROM: full    Dental     Cardiovascular   Rhythm and rate: regular and normal      Pulmonary    breath sounds clear to auscultation                    Anesthesia Plan      History & Physical Review  History and physical reviewed and following examination; no interval change.    ASA Status:  3 .    NPO Status:  > 6 hours    Plan for MAC          Postoperative Care      Consents  Anesthetic plan, risks, benefits and alternatives discussed with:  Patient..                          .

## 2017-11-07 ENCOUNTER — SURGERY (OUTPATIENT)
Age: 52
End: 2017-11-07

## 2017-11-07 ENCOUNTER — HOSPITAL ENCOUNTER (OUTPATIENT)
Facility: CLINIC | Age: 52
Discharge: HOME | End: 2017-11-07
Attending: SURGERY | Admitting: SURGERY
Payer: COMMERCIAL

## 2017-11-07 ENCOUNTER — ANESTHESIA (OUTPATIENT)
Dept: GASTROENTEROLOGY | Facility: CLINIC | Age: 52
End: 2017-11-07
Payer: COMMERCIAL

## 2017-11-07 VITALS
DIASTOLIC BLOOD PRESSURE: 81 MMHG | TEMPERATURE: 97.9 F | BODY MASS INDEX: 44.15 KG/M2 | RESPIRATION RATE: 16 BRPM | HEART RATE: 72 BPM | WEIGHT: 219 LBS | SYSTOLIC BLOOD PRESSURE: 155 MMHG | OXYGEN SATURATION: 96 % | HEIGHT: 59 IN

## 2017-11-07 LAB — COLONOSCOPY: NORMAL

## 2017-11-07 PROCEDURE — 25000125 ZZHC RX 250: Performed by: SURGERY

## 2017-11-07 PROCEDURE — 88305 TISSUE EXAM BY PATHOLOGIST: CPT | Performed by: SURGERY

## 2017-11-07 PROCEDURE — 25000128 H RX IP 250 OP 636: Performed by: NURSE ANESTHETIST, CERTIFIED REGISTERED

## 2017-11-07 PROCEDURE — 37000008 ZZH ANESTHESIA TECHNICAL FEE, 1ST 30 MIN: Performed by: SURGERY

## 2017-11-07 PROCEDURE — 45385 COLONOSCOPY W/LESION REMOVAL: CPT | Performed by: SURGERY

## 2017-11-07 PROCEDURE — 25000125 ZZHC RX 250: Performed by: NURSE ANESTHETIST, CERTIFIED REGISTERED

## 2017-11-07 PROCEDURE — 88305 TISSUE EXAM BY PATHOLOGIST: CPT | Mod: 26 | Performed by: SURGERY

## 2017-11-07 PROCEDURE — 25000128 H RX IP 250 OP 636: Performed by: SURGERY

## 2017-11-07 RX ORDER — ALBUTEROL SULFATE 0.83 MG/ML
2.5 SOLUTION RESPIRATORY (INHALATION) EVERY 6 HOURS PRN
Status: DISCONTINUED | OUTPATIENT
Start: 2017-11-07 | End: 2017-11-07 | Stop reason: HOSPADM

## 2017-11-07 RX ORDER — WARFARIN SODIUM 5 MG/1
TABLET ORAL
COMMUNITY
Start: 2017-10-08 | End: 2017-11-09

## 2017-11-07 RX ORDER — PROPOFOL 10 MG/ML
INJECTION, EMULSION INTRAVENOUS PRN
Status: DISCONTINUED | OUTPATIENT
Start: 2017-11-07 | End: 2017-11-07

## 2017-11-07 RX ORDER — ONDANSETRON 2 MG/ML
4 INJECTION INTRAMUSCULAR; INTRAVENOUS
Status: DISCONTINUED | OUTPATIENT
Start: 2017-11-07 | End: 2017-11-07 | Stop reason: HOSPADM

## 2017-11-07 RX ORDER — LIDOCAINE 40 MG/G
CREAM TOPICAL
Status: DISCONTINUED | OUTPATIENT
Start: 2017-11-07 | End: 2017-11-07 | Stop reason: HOSPADM

## 2017-11-07 RX ORDER — SODIUM CHLORIDE, SODIUM LACTATE, POTASSIUM CHLORIDE, CALCIUM CHLORIDE 600; 310; 30; 20 MG/100ML; MG/100ML; MG/100ML; MG/100ML
INJECTION, SOLUTION INTRAVENOUS CONTINUOUS
Status: DISCONTINUED | OUTPATIENT
Start: 2017-11-07 | End: 2017-11-07 | Stop reason: HOSPADM

## 2017-11-07 RX ADMIN — PROPOFOL 50 MG: 10 INJECTION, EMULSION INTRAVENOUS at 12:15

## 2017-11-07 RX ADMIN — PROPOFOL 150 MG: 10 INJECTION, EMULSION INTRAVENOUS at 12:20

## 2017-11-07 RX ADMIN — LIDOCAINE HYDROCHLORIDE 1 ML: 10 INJECTION, SOLUTION EPIDURAL; INFILTRATION; INTRACAUDAL; PERINEURAL at 11:37

## 2017-11-07 RX ADMIN — PROPOFOL 50 MG: 10 INJECTION, EMULSION INTRAVENOUS at 12:14

## 2017-11-07 RX ADMIN — PROPOFOL 150 MG: 10 INJECTION, EMULSION INTRAVENOUS at 12:21

## 2017-11-07 RX ADMIN — SODIUM CHLORIDE, POTASSIUM CHLORIDE, SODIUM LACTATE AND CALCIUM CHLORIDE: 600; 310; 30; 20 INJECTION, SOLUTION INTRAVENOUS at 11:37

## 2017-11-07 RX ADMIN — ALBUTEROL SULFATE 2.5 MG: 2.5 SOLUTION RESPIRATORY (INHALATION) at 11:47

## 2017-11-07 NOTE — H&P
51 year old year old female here for colonoscopy for blood in stool.    Patient Active Problem List   Diagnosis     Mild persistent asthma     Polyneuropathy in other diseases classified elsewhere (H)     DVT (deep venous thrombosis) (H)     Alcohol abuse, in remission     SECONDARY POLYCYTHEMIA     Chondromalacia of patella     Sensorineural hearing loss, asymmetrical     Developmental reading disorder     Esophageal reflux     Hypothyroidism     Fatty liver     GILES (Obstructive Sleep Apnea)-Moderate (AHI 16)     RLS (restless legs syndrome)     CARDIOVASCULAR SCREENING; LDL GOAL LESS THAN 100     Smoker     Erythrocytosis     Moderate mixed bipolar I disorder (H)     Personality disorder, depressive     COPD (chronic obstructive pulmonary disease) (H)     Current use of long term anticoagulation     Rosacea     Health Care Home     DDD (degenerative disc disease), cervical     Benign neoplasm of colon (POLYPOSIS)     Stroke (H)     Somatization disorder     Sebaceous cyst of right axilla     HTN, goal below 140/90     Left leg pain     Vitamin D deficiency     Long term current use of anticoagulant therapy     Morbid obesity (H)     PVD (peripheral vascular disease) with claudication (H)     Cyst of left ovary     Morbid obesity with alveolar hypoventilation (H)     GI bleed     Severe episode of recurrent major depressive disorder (H)       Past Medical History:   Diagnosis Date     Acromioclavicular joint arthritis 1/25/2017     Acute bronchospasm 8/15/2016     Acute gouty arthritis 8/4/2014     Anxiety state, unspecified      Bipolar I disorder, most recent episode (or current) unspecified      Closed fracture of metatarsal bone 6/5/2007     Depressive disorder, not elsewhere classified      DVT, lower extremity (H)      Headache 10/17/2014     Impingement syndrome of shoulder region 1/25/2017     Polycythemia, secondary     [place] HGB      Right shoulder pain 10/14/2014     Trochanteric bursitis of both hips  1/11/2016       Past Surgical History:   Procedure Laterality Date     BILIARY STENT SINGLE USE COV      3 stints in left leg     BONE MARROW BIOPSY, BONE SPECIMEN, NEEDLE/TROCAR N/A 11/17/2014    Procedure: BIOPSY BONE MARROW;  Surgeon: Hay Aaron MD;  Location: [facility]     D & C  10/26/09    with uterine ablation     ESOPHAGOSCOPY, GASTROSCOPY, DUODENOSCOPY (EGD), COMBINED  4/21/2014    Procedure: Gastroscopy;  Surgeon: Moris Thomas MD;  Location: [facility]     HYSTERECTOMY, PAP NO LONGER INDICATED  1-4-2010     LITHOTRIPSY  2004    [facility]otrypsy       [facility]    No current outpatient prescriptions on file.       Allergies   Allergen Reactions     Darvocet [Propoxyphene N-Apap] Anaphylaxis     Darvocet,Percocet      Percocet [Codeine] Anaphylaxis     Percocet [Oxycodone-Acetaminophen] Swelling, Anaphylaxis and Hives     Asa [Aspirin] Hives     Aspirin causes seizures and hives, throat swelling       Aspirin Hives     Bee      Lyrica [Pregabalin] Other (See Comments) and Swelling     dizziness     Iodine Rash     Reaction to topical betadine     Povidone Iodine Rash     Reaction to topical betadine     Tape [Adhesive Tape] Rash       Pt reports that she has been smoking Cigarettes.  She has a 17.00 pack-year smoking history. She has never used smokeless tobacco. She reports that she does not drink alcohol or use illicit drugs.    Exam:  LMP 09/27/2009    Awake, Alert OX3  Lungs - CTA bilaterally  CV - RRR, no murmurs, distal pulses intact  Abd - soft, non-distended, non-tender, +BS  Extr - No cyanosis or edema    A/P 51 year old year old female in need of colonoscopy for blood in stool. Risks, benefits, alternatives, and complications were discussed including the possibility of perforation and the patient agreed to proceed.    Haim Lynch MD

## 2017-11-07 NOTE — ANESTHESIA POSTPROCEDURE EVALUATION
Patient: Bia Bill    Procedure(s):  Colonoscopy - Wound Class: II-Clean Contaminated    Diagnosis:bright red blood per rectum  Diagnosis Additional Information: No value filed.    Anesthesia Type:  MAC    Note:  Anesthesia Post Evaluation    Patient location during evaluation: Bedside  Patient participation: Able to fully participate in evaluation  Level of consciousness: awake and alert  Pain management: adequate  Airway patency: patent  Cardiovascular status: acceptable  Respiratory status: acceptable  Hydration status: acceptable  PONV: none     Anesthetic complications: None          Last vitals:  Vitals:    11/07/17 1114   BP: (!) 156/99   Pulse: 72   Resp: 18   Temp: 36.6  C (97.9  F)   SpO2: 95%         Electronically Signed By: LEONIDES Boyle CRNA  November 7, 2017  12:34 PM

## 2017-11-07 NOTE — ANESTHESIA CARE TRANSFER NOTE
Patient: Bia Bill    Procedure(s):  Colonoscopy - Wound Class: II-Clean Contaminated    Diagnosis: bright red blood per rectum  Diagnosis Additional Information: No value filed.    Anesthesia Type:   MAC     Note:  Airway :Room Air  Patient transferred to:Phase II  Handoff Report: Identifed the Patient, Identified the Reponsible Provider, Reviewed the pertinent medical history, Discussed the surgical course, Reviewed Intra-OP anesthesia mangement and issues during anesthesia, Set expectations for post-procedure period and Allowed opportunity for questions and acknowledgement of understanding      Vitals: (Last set prior to Anesthesia Care Transfer)    CRNA VITALS  11/7/2017 1203 - 11/7/2017 1233      11/7/2017             Pulse: 85    SpO2: 94 %                Electronically Signed By: LEONIDES Boyle CRNA  November 7, 2017  12:33 PM

## 2017-11-07 NOTE — BRIEF OP NOTE
[facility]   Brief Operative Note    Pre-operative diagnosis: bright red blood per rectum   Post-operative diagnosis single polyp, otherwise normal   Procedure: Procedure(s):  Colonoscopy - Wound Class: II-Clean Contaminated   Surgeon(s): Surgeon(s) and Role:     * Haim Lynch MD - Primary   Estimated blood loss: * No values recorded between 11/7/2017 12:00 AM and 11/7/2017 12:31 PM *    Specimens:   ID Type Source Tests Collected by Time Destination   A : 30cm polyp Polyp Colon SURGICAL PATHOLOGY EXAM Haim Lynch MD 11/7/2017 12:19 PM       Findings: 1. Single 6 mm polyp at 30 cm  2. Colon otherwise normal

## 2017-11-08 ENCOUNTER — OFFICE VISIT (OUTPATIENT)
Dept: FAMILY MEDICINE | Facility: CLINIC | Age: 52
End: 2017-11-08
Payer: COMMERCIAL

## 2017-11-08 VITALS
TEMPERATURE: 97.8 F | OXYGEN SATURATION: 96 % | SYSTOLIC BLOOD PRESSURE: 132 MMHG | HEART RATE: 68 BPM | WEIGHT: 220.5 LBS | BODY MASS INDEX: 44.45 KG/M2 | DIASTOLIC BLOOD PRESSURE: 63 MMHG | HEIGHT: 59 IN

## 2017-11-08 DIAGNOSIS — I82.509 CHRONIC DEEP VEIN THROMBOSIS (DVT) OF LOWER EXTREMITY, UNSPECIFIED LATERALITY, UNSPECIFIED VEIN (H): Primary | Chronic | ICD-10-CM

## 2017-11-08 PROCEDURE — 99213 OFFICE O/P EST LOW 20 MIN: CPT | Performed by: FAMILY MEDICINE

## 2017-11-08 ASSESSMENT — PATIENT HEALTH QUESTIONNAIRE - PHQ9
5. POOR APPETITE OR OVEREATING: SEVERAL DAYS
SUM OF ALL RESPONSES TO PHQ QUESTIONS 1-9: 8

## 2017-11-08 ASSESSMENT — ANXIETY QUESTIONNAIRES
1. FEELING NERVOUS, ANXIOUS, OR ON EDGE: SEVERAL DAYS
3. WORRYING TOO MUCH ABOUT DIFFERENT THINGS: SEVERAL DAYS
7. FEELING AFRAID AS IF SOMETHING AWFUL MIGHT HAPPEN: NOT AT ALL
5. BEING SO RESTLESS THAT IT IS HARD TO SIT STILL: SEVERAL DAYS
2. NOT BEING ABLE TO STOP OR CONTROL WORRYING: SEVERAL DAYS
6. BECOMING EASILY ANNOYED OR IRRITABLE: SEVERAL DAYS
GAD7 TOTAL SCORE: 6

## 2017-11-08 NOTE — MR AVS SNAPSHOT
After Visit Summary   11/8/2017    Bia Bill    MRN: [id number]           Patient Information     Date Of Birth          1965        Visit Information        Provider Department      11/8/2017 11:00 AM Kd Leong MD [facility]        Today's Diagnoses     Chronic deep vein thrombosis (DVT) of lower extremity, unspecified laterality, unspecified vein (H)    -  1       Follow-ups after your visit        Additional Services     INR CLINIC REFERRAL       Your provider has referred you to INR Services.    Please be aware that coverage of these services is subject to the terms and limitations of your health insurance plan.  Call member services at your health plan with any benefit or coverage questions.    Indication for Anticoagulation: DVT (recurrent)  Postive antilpen  If nonstandard INR is desired, indicate goal range and explanation: rectal bleeding INR goal 2.0-2.5  Expected Duration of Therapy: Lifetime                  Your next 10 appointments already scheduled     Nov 17, 2017  1:30 PM CST   LAB with [facility])    [address] [address]   [phone]           Please do not eat 10-12 hours before your appointment if you are coming in fasting for labs on lipids, cholesterol, or glucose (sugar). This does not apply to pregnant women. Water, hot tea and black coffee (with nothing added) are okay. Do not drink other fluids, diet soda or chew gum.            Nov 21, 2017  9:30 AM CST   Return Visit with Kd Morris   [facility] ([facility])    [address] [address]   [phone]            Dec 15, 2017  1:30 PM CST   LAB with [facility] ([facility])    [address] [address]   [phone]           Please do not eat 10-12 hours before your appointment if you are coming in fasting for  labs on lipids, cholesterol, or glucose (sugar). This does not apply to pregnant women. Water, hot tea and black coffee (with nothing added) are okay. Do not drink other fluids, diet soda or chew gum.            Dec 15, 2017  1:35 PM CST   LAB with [facility] ([facility])    [address] [address]   [phone]           Please do not eat 10-12 hours before your appointment if you are coming in fasting for labs on lipids, cholesterol, or glucose (sugar). This does not apply to pregnant women. Water, hot tea and black coffee (with nothing added) are okay. Do not drink other fluids, diet soda or chew gum.            Jan 12, 2018  1:30 PM CST   LAB with [facility] ([facility])    [address] [address]   [phone]           Please do not eat 10-12 hours before your appointment if you are coming in fasting for labs on lipids, cholesterol, or glucose (sugar). This does not apply to pregnant women. Water, hot tea and black coffee (with nothing added) are okay. Do not drink other fluids, diet soda or chew gum.            Feb 09, 2018  1:30 PM CST   LAB with [facility] ([facility])    [address] [address]   [phone]           Please do not eat 10-12 hours before your appointment if you are coming in fasting for labs on lipids, cholesterol, or glucose (sugar). This does not apply to pregnant women. Water, hot tea and black coffee (with nothing added) are okay. Do not drink other fluids, diet soda or chew gum.            Mar 16, 2018  1:30 PM CDT   LAB with [facility] ([facility])    [address] [address]   [phone]           Please do not eat 10-12 hours before your appointment if you are coming in fasting for labs on lipids, cholesterol, or glucose (sugar). This does  not apply to pregnant women. Water, hot tea and black coffee (with nothing added) are okay. Do not drink other fluids, diet soda or chew gum.            Mar 16, 2018  1:35 PM CDT   [facility] with [facility]   [facility]    [address] [address]   [phone]           Please do not eat 10-12 hours before your appointment if you are coming in fasting for labs on lipids, cholesterol, or glucose (sugar). This does not apply to pregnant women. Water, hot tea and black coffee (with nothing added) are okay. Do not drink other fluids, diet soda or chew gum.            Mar 23, 2018  1:00 PM CDT   Return Visit with Joshua Watts MD   [facility] ([facility])    [facility]  [address]  [facility] [address]   [phone]            Apr 06, 2018  1:30 PM CDT   LAB with [facility]   [facility] ([facility])    [address] [address]   [phone]           Please do not eat 10-12 hours before your appointment if you are coming in fasting for labs on lipids, cholesterol, or glucose (sugar). This does not apply to pregnant women. Water, hot tea and black coffee (with nothing added) are okay. Do not drink other fluids, diet soda or chew gum.              Who to contact     Normal or non-critical lab and imaging results will be communicated to you by eLamahart, letter or phone within 4 business days after the clinic has received the results. If you do not hear from us within 7 days, please contact the clinic through eLamahart or phone. If you have a critical or abnormal lab result, we will notify you by phone as soon as possible.  Submit refill requests through Leetchi or call your pharmacy and they will forward the refill request to us. Please allow 3 business days for your refill to be completed.          If you need to speak with a  for additional  "information , please call: [phone]             Additional Information About Your Visit        MyChart Information     Hipvanhart lets you send messages to your doctor, view your test results, renew your prescriptions, schedule appointments and more. To sign up, go to [email or web address] . Click on \"Log in\" on the left side of the screen, which will take you to the Welcome page. Then click on \"Sign up Now\" on the right side of the page.     You will be asked to enter the access code listed below, as well as some personal information. Please follow the directions to create your username and password.     Your access code is: [email or web address]  Expires: 2018 11:08 PM     Your access code will  in 90 days. If you need help or a new code, please call your [place] clinic or [phone].        Care EveryWhere ID     This is your Care EveryWhere ID. This could be used by other organizations to access your [place] medical records  [id number]        Your Vitals Were     Pulse Temperature Height Last Period Pulse Oximetry BMI (Body Mass Index)    68 97.8  F (36.6  C) (Tympanic) 4' 10.5\" (1.486 m) 2009 96% 45.3 kg/m2       Blood Pressure from Last 3 Encounters:   17 132/63   17 155/81   10/25/17 135/62    Weight from Last 3 Encounters:   17 220 lb 8 oz (100 kg)   17 219 lb (99.3 kg)   10/25/17 219 lb (99.3 kg)              We Performed the Following     INR CLINIC REFERRAL        Primary Care Provider Office Phone # Fax #    Kd Leong [phone]       [facility] [address] [address] [address]        Equal Access to Services     DORIAN IRBY : Elfego Clark, wahoada luqadaha, qaybta kaalmafrancis clay, nitin craig. So [facility] [phone].    ATENCIÓN: Si habla español, tiene a terry disposición servicios gratuitos de asistencia lingüística. Llowen al [phone].    We comply with applicable federal " civil rights laws and [place] laws. We do not discriminate on the basis of race, color, national origin, age, disability, sex, sexual orientation, or gender identity.            Thank you!     Thank you for choosing [facility]  for your care. Our goal is always to provide you with excellent care. Hearing back from our patients is one way we can continue to improve our services. Please take a few minutes to complete the written survey that you may receive in the mail after your visit with us. Thank you!             Your Updated Medication List - Protect others around you: Learn how to safely use, store and throw away your medicines at www.disposemymeds.org.          This list is accurate as of: 11/8/17 11:59 PM.  Always use your most recent med list.                   Brand Name Dispense Instructions for use Diagnosis    acetaminophen 500 MG tablet    TYLENOL     Take 500-1,000 mg by mouth every 6 hours as needed for mild pain        * albuterol 108 (90 BASE) MCG/ACT Inhaler    PROAIR HFA/PROVENTIL HFA/VENTOLIN HFA    3 Inhaler    Inhale 2 puffs into the lungs every 6 hours as needed for shortness of breath / dyspnea or wheezing    Mild persistent asthma without complication       * albuterol (2.5 MG/3ML) 0.083% neb solution     1 Box    Take 1 vial (2.5 mg) by nebulization every 6 hours as needed for shortness of breath / dyspnea or wheezing    Mild persistent asthma without complication       allopurinol 100 MG tablet    ZYLOPRIM    90 tablet    TAKE ONE TABLET BY MOUTH ONE TIME DAILY    Acute gouty arthritis       ARIPiprazole 5 MG tablet    ABILIFY    180 tablet    Take 1.5 tablets (7.5 mg) by mouth At Bedtime    Major depressive disorder, recurrent episode, moderate (H)       buPROPion 150 MG 24 hr tablet    WELLBUTRIN XL    30 tablet    TAKE  ONE TABLET BY MOUTH EVERY DAY IN THE MORNING    COPD exacerbation (H)       * calcium citrate-vitamin D 315-200 MG-UNIT Tabs per tablet    CITRACAL     Take  two tablet at dinner and one tablet at bedtime.        * CITRUS CALCIUM +D 315-250 MG-UNIT Tabs per tablet   Generic drug:  calcium citrate-vitamin D           EPINEPHrine 0.3 MG/0.3ML injection 2-pack    EPIPEN/ADRENACLICK/or ANY BX GENERIC EQUIV    0.6 mL    Inject 0.3 mLs (0.3 mg) into the muscle once as needed for anaphylaxis    Anaphylactic reaction to bee sting, undetermined intent, subsequent encounter       furosemide 20 MG tablet    LASIX    180 tablet    TAKE ONE TABLET BY MOUTH TWICE DAILY    Lymphedema of both lower extremities       * gabapentin 300 MG capsule    NEURONTIN    90 capsule    Take 1 capsule (300 mg) by mouth At Bedtime Take with 600mg at bedtime. For total dose of 600mg in am 600 at noon and 900mg at bedtime    Major depressive disorder, recurrent episode, moderate (H), Restless leg syndrome       * gabapentin 600 MG tablet    NEURONTIN    90 tablet    TAKE 1 TABLET (600 MG) BY MOUTH 3 TIMES DAILY    PVD (peripheral vascular disease) with claudication (H)       lamoTRIgine 200 MG tablet    LaMICtal    90 tablet    Take 1 tablet (200 mg) by mouth daily    Major depressive disorder, recurrent episode, moderate (H)       levothyroxine 150 MCG tablet    SYNTHROID/LEVOTHROID    90 tablet    TAKE ONE TABLET BY MOUTH DAILY    Hypothyroidism, unspecified type       mometasone-formoterol 100-5 MCG/ACT oral inhaler    DULERA    13 g    Inhale 2 puffs into the lungs 2 times daily    Mild persistent asthma without complication       omeprazole 20 MG CR capsule    priLOSEC    180 capsule    TAKE 1 CAPSULE (20 MG) BY MOUTH 2 TIMES DAILY    Gastroesophageal reflux disease without esophagitis       * order for DME      Equipment being ordered: CPAP AIRSENSE 10 5-18 CM H20 # [id number]   DN# [id number]        * order for DME     1 Month    Equipment being ordered: INCONTINENCE PADS  QID    Other urinary incontinence       * order for DME     60 Month    Equipment being ordered: DEPENDS SIZE LARGE    Mixed  "incontinence       * order for DME     2 Units    Equipment being ordered: RojjndNvus1234\" x2pair   \"[facility]\" x 2 pair    Peripheral edema       simvastatin 20 MG tablet    ZOCOR    90 tablet    TAKE ONE TABLET BY MOUTH AT BEDTIME    Hypercholesteremia       traZODone 150 MG tablet    DESYREL    90 tablet    Take 1 tablet (150 mg) by mouth At Bedtime    Major depressive disorder, recurrent episode, moderate (H)       VITAMIN D (CHOLECALCIFEROL) PO      Take 1,000 Units by mouth daily        warfarin 5 MG tablet    COUMADIN          * Notice:  This list has 10 medication(s) that are the same as other medications prescribed for you. Read the directions carefully, and ask your doctor or other care provider to review them with you.      "

## 2017-11-08 NOTE — NURSING NOTE
"Chief Complaint   Patient presents with     Follow Up For     appointment on 10/25/2017       Initial /63 (BP Location: Right arm, Patient Position: Sitting, Cuff Size: Adult Large)  Pulse 68  Temp 97.8  F (36.6  C) (Tympanic)  Ht 4' 10.5\" (1.486 m)  Wt 220 lb 8 oz (100 kg)  LMP 09/27/2009  SpO2 96%  BMI 45.3 kg/m2 Estimated body mass index is 45.3 kg/(m^2) as calculated from the following:    Height as of this encounter: 4' 10.5\" (1.486 m).    Weight as of this encounter: 220 lb 8 oz (100 kg).  Medication Reconciliation: complete   Jennifer Harris CMA  "

## 2017-11-08 NOTE — PROGRESS NOTES
SUBJECTIVE:                                                    Bia Bill is a 51 year old female who presents to clinic today for the following health issues:    **Here to follow up from her colonoscopy that she had done yesterday 11/07/2017. Discuss results.     Problem list and histories reviewed & adjusted, as indicated.  Additional history: no sign of bleeding noted.  She still has bleeding when she wipes  Less than a drop of blood.    Patient Active Problem List   Diagnosis     Mild persistent asthma     Polyneuropathy in other diseases classified elsewhere (H)     DVT (deep venous thrombosis) (H)     Alcohol abuse, in remission     SECONDARY POLYCYTHEMIA     Chondromalacia of patella     Sensorineural hearing loss, asymmetrical     Developmental reading disorder     Esophageal reflux     Hypothyroidism     Fatty liver     GILES (Obstructive Sleep Apnea)-Moderate (AHI 16)     RLS (restless legs syndrome)     CARDIOVASCULAR SCREENING; LDL GOAL LESS THAN 100     Smoker     Erythrocytosis     Moderate mixed bipolar I disorder (H)     Personality disorder, depressive     COPD (chronic obstructive pulmonary disease) (H)     Current use of long term anticoagulation     Rosacea     Health Care Home     DDD (degenerative disc disease), cervical     Benign neoplasm of colon (POLYPOSIS)     Stroke (H)     Somatization disorder     Sebaceous cyst of right axilla     HTN, goal below 140/90     Left leg pain     Vitamin D deficiency     Long term current use of anticoagulant therapy     Morbid obesity (H)     PVD (peripheral vascular disease) with claudication (H)     Cyst of left ovary     Morbid obesity with alveolar hypoventilation (H)     GI bleed     Severe episode of recurrent major depressive disorder (H)     Past Surgical History:   Procedure Laterality Date     BILIARY STENT SINGLE USE COV      3 stints in left leg     BONE MARROW BIOPSY, BONE SPECIMEN, NEEDLE/TROCAR N/A 11/17/2014    Procedure: BIOPSY BONE  "MARROW;  Surgeon: Hay Aaron MD;  Location: [facility]     D & C  10/26/09    with uterine ablation     ESOPHAGOSCOPY, GASTROSCOPY, DUODENOSCOPY (EGD), COMBINED  4/21/2014    Procedure: Gastroscopy;  Surgeon: Moris Thomas MD;  Location: [facility]     HYSTERECTOMY, PAP NO LONGER INDICATED  1-4-2010     LITHOTRIPSY  2004    Lithotrypsy       Social History   Substance Use Topics     Smoking status: Current Every Day Smoker     Packs/day: 0.50     Years: 34.00     Types: Cigarettes     Smokeless tobacco: Never Used      Comment: started at age 11.  Quit during pregnancyX4.  0.5 ppd (6/2016)     Alcohol use No      Comment: quit 1995     Family History   Problem Relation Age of Onset     Hypertension Mother      DIABETES Mother      Blood Disease Mother      HEART DISEASE Mother      chf     CEREBROVASCULAR DISEASE Mother      Hypertension Father      CANCER Father      lung cancer     Allergies Sister      Depression Sister      Hypertension Sister              ROS:  Constitutional, HEENT, cardiovascular, pulmonary, gi and gu systems are negative, except as otherwise noted.      OBJECTIVE:                                                    /63 (BP Location: Right arm, Patient Position: Sitting, Cuff Size: Adult Large)  Pulse 68  Temp 97.8  F (36.6  C) (Tympanic)  Ht 4' 10.5\" (1.486 m)  Wt 220 lb 8 oz (100 kg)  LMP 09/27/2009  SpO2 96%  BMI 45.3 kg/m2 Body mass index is 45.3 kg/(m^2).   GENERAL: healthy, alert, well nourished, well hydrated, no distress  HENT: ear canals- normal; TMs- normal; Nose- normal; Mouth- no ulcers, no lesions  NECK: no tenderness, no adenopathy, no asymmetry, no masses, no stiffness; thyroid- normal to palpation  RESP: lungs clear to auscultation - no rales, no rhonchi, no wheezes  CV: regular rates and rhythm, normal S1 S2, no S3 or S4 and no murmur, no click or rub -  ABDOMEN: soft, no tenderness, no  hepatosplenomegaly, no masses, normal bowel sounds   "     ASSESSMENT/PLAN:                                                      Bright red blood per rectum  will have her continue miralax daily. She is instructd to contact me if bleedig is increasing.  She is reminded to kep a clse eye on inr.   perhaps a goal of 2-2.5 would be beneficial      reports that she has been smoking Cigarettes.  She has a 17.00 pack-year smoking history. She has never used smokeless tobacco.      [facility]

## 2017-11-09 ENCOUNTER — ANTICOAGULATION THERAPY VISIT (OUTPATIENT)
Dept: ANTICOAGULATION | Facility: CLINIC | Age: 52
End: 2017-11-09
Payer: COMMERCIAL

## 2017-11-09 ENCOUNTER — TELEPHONE (OUTPATIENT)
Dept: FAMILY MEDICINE | Facility: CLINIC | Age: 52
End: 2017-11-09

## 2017-11-09 DIAGNOSIS — F33.1 MAJOR DEPRESSIVE DISORDER, RECURRENT EPISODE, MODERATE (H): ICD-10-CM

## 2017-11-09 DIAGNOSIS — I73.9 PVD (PERIPHERAL VASCULAR DISEASE) WITH CLAUDICATION (H): ICD-10-CM

## 2017-11-09 DIAGNOSIS — Z79.01 LONG TERM CURRENT USE OF ANTICOAGULANT THERAPY: ICD-10-CM

## 2017-11-09 DIAGNOSIS — G25.81 RESTLESS LEG SYNDROME: ICD-10-CM

## 2017-11-09 LAB — COPATH REPORT: NORMAL

## 2017-11-09 PROCEDURE — 99207 ZZC NO CHARGE NURSE ONLY: CPT | Performed by: REGISTERED NURSE

## 2017-11-09 RX ORDER — GABAPENTIN 300 MG/1
300 CAPSULE ORAL 3 TIMES DAILY
Qty: 90 CAPSULE | Refills: 0 | COMMUNITY
Start: 2017-11-09 | End: 2018-03-07

## 2017-11-09 RX ORDER — WARFARIN SODIUM 5 MG/1
TABLET ORAL
Qty: 30 TABLET | COMMUNITY
Start: 2017-11-09 | End: 2017-11-17

## 2017-11-09 ASSESSMENT — ANXIETY QUESTIONNAIRES: GAD7 TOTAL SCORE: 6

## 2017-11-09 NOTE — TELEPHONE ENCOUNTER
Patient was seen in clinic yesterday. She was wondering when she should be starting the warfarin. Also reports that she is still very nauseated and unable to keep down any food, not even soup. How do you advise. Thank you.  Aubree Richardson RN

## 2017-11-09 NOTE — MR AVS SNAPSHOT
"     Bia Bill   11/9/2017   Anticoagulation Therapy Visit    Description:  51 year old female   Provider:  Brenna Rausch, RN   Department:  [facility]           INR as of 11/9/2017     Today's INR No new INR was available at the time of this encounter.      Anticoagulation Summary as of 11/9/2017     INR goal 2.0-2.5   Prior goal 2.0-3.0   Today's INR No new INR was available at the time of this encounter.   Full instructions 11/9: 5 mg; 11/10: 5 mg; 11/11: 5 mg; 11/12: 5 mg; 11/13: 5 mg   Next INR check 11/14/2017    Indications   Long term current use of anticoagulant therapy [Z79.01]  DVT (deep venous thrombosis) (H) [I82.409]  PVD (peripheral vascular disease) with claudication (H) [I73.9]         Description     11/9/17, per Dr. Leong: \"..I want you to restart coumadin today taking 5 mg a day.  I have contacted the anticoagulation clininc as I want to have a gaol of 2.0-2.5 instead of the normal goal of 2-3.  They should call you soon to set up recheck..'  Writer spoke with pt and recommended she follow MD dose orders, and recheck Monday 11/13,  after appt with PCP; pt understands she may not get a call with result until Tuesday 11/14.      November 2017 Details    Sun Mon Tue Wed Thu Fri Sat        1               2               3               4                 5               6               7               8               9      5 mg   See details      10      5 mg         11      5 mg           12      5 mg         13      5 mg         14            15               16               17               18                 19               20               21               22               23               24               25                 26               27               28               29               30                  Date Details   11/09 This INR check       Date of next INR:  11/14/2017         How to take your warfarin dose     To take:  5 mg Take 1 of the 5 mg tablets.           "

## 2017-11-09 NOTE — TELEPHONE ENCOUNTER
.Reason for Call:  Other prescription    Detailed comments: Bia LEFT MESSAGE:  She was seen by Dr. Leong on 11/8/17 and she was wondering when to start the warfarin (I believe that she said - it was hard to hear her).  Stated that this wasn't discussed at appointment yesterday.  Please call and assess.  Thank you..Lizzette Mcintosh    Phone Number Patient can be reached at: Home number on file [phone] (home)    Best Time: did not specify    Can we leave a detailed message on this number? did not specify    Call taken on 11/9/2017 at 10:01 AM by Lizzette Mcintosh

## 2017-11-09 NOTE — PROGRESS NOTES
ANTICOAGULATION FOLLOW-UP CLINIC VISIT    Patient Name:  Bia Bill  Date:  11/9/2017  Contact Type:  Telephone/ discussed with pt    SUBJECTIVE:     Patient Findings     Positives Change in medications (11/9/17: gabapentin: Decrease to 300mg 3 times a day.), Blood in stool (11/8/17 EPIC OV notes: Bright red blood per rectum ), Initiation of therapy (Pt to restart today per Dr. Leong after three week hold), Intentional hold of therapy (On hold since 10/19/17)           OBJECTIVE    INR   Date Value Ref Range Status   10/20/2017 1.20 (H) 0.86 - 1.14 Final       ASSESSMENT / PLAN  No question data found.  Anticoagulation Summary as of 11/9/2017     INR goal 2.0-2.5   Prior goal 2.0-3.0   Today's INR No new INR was available at the time of this encounter.   Maintenance plan No maintenance plan   Full instructions 11/9: 5 mg; 11/10: 5 mg; 11/11: 5 mg; 11/12: 5 mg; 11/13: 5 mg   Plan last modified Brenna Rausch RN (11/9/2017)   Next INR check 11/14/2017   Priority INR   Target end date Indefinite    Indications   Long term current use of anticoagulant therapy [Z79.01]  DVT (deep venous thrombosis) (H) [I82.409]  PVD (peripheral vascular disease) with claudication (H) [I73.9]         Anticoagulation Episode Summary     INR check location     Preferred lab     Send INR reminders to [facility] POOL    Comments * lab draw due to elevated hematocrit (fingerstick meters don't work). LEFT LE. STENT x 3 LEFT UPPER LEG. Previous arterial clot. New goal range 2-2.5 starting 11/6/17.      Anticoagulation Care Providers     Provider Role Specialty Phone number    Kd Leong MD [facility] [phone]            See the Encounter Report to view Anticoagulation Flowsheet and Dosing Calendar (Go to Encounters tab in chart review, and find the Anticoagulation Therapy Visit)    Brenna Rausch RN

## 2017-11-13 ENCOUNTER — RADIANT APPOINTMENT (OUTPATIENT)
Dept: GENERAL RADIOLOGY | Facility: CLINIC | Age: 52
End: 2017-11-13
Attending: FAMILY MEDICINE
Payer: COMMERCIAL

## 2017-11-13 ENCOUNTER — OFFICE VISIT (OUTPATIENT)
Dept: FAMILY MEDICINE | Facility: CLINIC | Age: 52
End: 2017-11-13
Payer: COMMERCIAL

## 2017-11-13 VITALS
HEIGHT: 59 IN | HEART RATE: 70 BPM | SYSTOLIC BLOOD PRESSURE: 129 MMHG | RESPIRATION RATE: 28 BRPM | TEMPERATURE: 97.2 F | OXYGEN SATURATION: 96 % | DIASTOLIC BLOOD PRESSURE: 64 MMHG | BODY MASS INDEX: 44.39 KG/M2 | WEIGHT: 220.2 LBS

## 2017-11-13 DIAGNOSIS — R14.1 FLATULENCE, ERUCTATION, AND GAS PAIN: Primary | ICD-10-CM

## 2017-11-13 DIAGNOSIS — R14.3 FLATULENCE, ERUCTATION, AND GAS PAIN: ICD-10-CM

## 2017-11-13 DIAGNOSIS — R14.3 FLATULENCE, ERUCTATION, AND GAS PAIN: Primary | ICD-10-CM

## 2017-11-13 DIAGNOSIS — R14.2 FLATULENCE, ERUCTATION, AND GAS PAIN: Primary | ICD-10-CM

## 2017-11-13 DIAGNOSIS — Z79.01 CURRENT USE OF LONG TERM ANTICOAGULATION: Chronic | ICD-10-CM

## 2017-11-13 DIAGNOSIS — R14.2 FLATULENCE, ERUCTATION, AND GAS PAIN: ICD-10-CM

## 2017-11-13 DIAGNOSIS — R14.1 FLATULENCE, ERUCTATION, AND GAS PAIN: ICD-10-CM

## 2017-11-13 LAB — INR PPP: 1.12 (ref 0.86–1.14)

## 2017-11-13 PROCEDURE — 80053 COMPREHEN METABOLIC PANEL: CPT | Performed by: FAMILY MEDICINE

## 2017-11-13 PROCEDURE — 36415 COLL VENOUS BLD VENIPUNCTURE: CPT | Performed by: FAMILY MEDICINE

## 2017-11-13 PROCEDURE — 85610 PROTHROMBIN TIME: CPT | Performed by: FAMILY MEDICINE

## 2017-11-13 PROCEDURE — 85025 COMPLETE CBC W/AUTO DIFF WBC: CPT | Performed by: FAMILY MEDICINE

## 2017-11-13 PROCEDURE — 74020 XR ABDOMEN 2 VW: CPT

## 2017-11-13 PROCEDURE — 99214 OFFICE O/P EST MOD 30 MIN: CPT | Performed by: FAMILY MEDICINE

## 2017-11-13 RX ORDER — METOCLOPRAMIDE 10 MG/1
10 TABLET ORAL 4 TIMES DAILY PRN
Qty: 40 TABLET | Refills: 0 | Status: SHIPPED | OUTPATIENT
Start: 2017-11-13 | End: 2018-05-16

## 2017-11-13 NOTE — MR AVS SNAPSHOT
After Visit Summary   11/13/2017    Bia Bill    MRN: [id number]           Patient Information     Date Of Birth          1965        Visit Information        Provider Department      11/13/2017 1:00 PM Kd Leong MD [facility]        Today's Diagnoses     Flatulence, eructation, and gas pain    -  1    Current use of long term anticoagulation           Follow-ups after your visit        Your next 10 appointments already scheduled     Nov 13, 2017  1:45 PM CST   LAB with [facility] ([facility])    [address]  [facility] [address]   [phone]           Please do not eat 10-12 hours before your appointment if you are coming in fasting for labs on lipids, cholesterol, or glucose (sugar). This does not apply to pregnant women. Water, hot tea and black coffee (with nothing added) are okay. Do not drink other fluids, diet soda or chew gum.            Nov 17, 2017  1:30 PM CST   LAB with [facility] ([facility])    [address] [address]   [phone]           Please do not eat 10-12 hours before your appointment if you are coming in fasting for labs on lipids, cholesterol, or glucose (sugar). This does not apply to pregnant women. Water, hot tea and black coffee (with nothing added) are okay. Do not drink other fluids, diet soda or chew gum.            Nov 21, 2017  9:30 AM CST   Return Visit with Kd Morris   [facility] ([facility])    [address] [address]   [phone]            Dec 15, 2017  1:30 PM CST   LAB with [facility] ([facility])    [address] [address]   [phone]           Please do not eat 10-12 hours before your appointment if you are coming in fasting for labs on lipids, cholesterol, or glucose (sugar). This does not apply to pregnant  women. Water, hot tea and black coffee (with nothing added) are okay. Do not drink other fluids, diet soda or chew gum.            Dec 15, 2017  1:35 PM CST   LAB with [facility] ([facility])    [address] [address]   [phone]           Please do not eat 10-12 hours before your appointment if you are coming in fasting for labs on lipids, cholesterol, or glucose (sugar). This does not apply to pregnant women. Water, hot tea and black coffee (with nothing added) are okay. Do not drink other fluids, diet soda or chew gum.            Jan 12, 2018  1:30 PM CST   LAB with [facility] ([facility])    [address] [address]   [phone]           Please do not eat 10-12 hours before your appointment if you are coming in fasting for labs on lipids, cholesterol, or glucose (sugar). This does not apply to pregnant women. Water, hot tea and black coffee (with nothing added) are okay. Do not drink other fluids, diet soda or chew gum.            Feb 09, 2018  1:30 PM CST   LAB with [facility] ([facility])    [address] [address]   [phone]           Please do not eat 10-12 hours before your appointment if you are coming in fasting for labs on lipids, cholesterol, or glucose (sugar). This does not apply to pregnant women. Water, hot tea and black coffee (with nothing added) are okay. Do not drink other fluids, diet soda or chew gum.            Mar 16, 2018  1:30 PM CDT   LAB with [facility] ([facility])    [address] [address]   [phone]           Please do not eat 10-12 hours before your appointment if you are coming in fasting for labs on lipids, cholesterol, or glucose (sugar). This does not apply to pregnant women. Water, hot tea and black coffee (with nothing added)  "are okay. Do not drink other fluids, diet soda or chew gum.            Mar 16, 2018  1:35 PM CDT   LAB with [facility] ([facility])    [address]  [facility] [address]   [phone]           Please do not eat 10-12 hours before your appointment if you are coming in fasting for labs on lipids, cholesterol, or glucose (sugar). This does not apply to pregnant women. Water, hot tea and black coffee (with nothing added) are okay. Do not drink other fluids, diet soda or chew gum.            Mar 23, 2018  1:00 PM CDT   Return Visit with Joshua Watts MD   [facility] ([facility])    [facility]  [address]  [facility] [address]   [phone]              Who to contact     Normal or non-critical lab and imaging results will be communicated to you by Go Try It Onhart, letter or phone within 4 business days after the clinic has received the results. If you do not hear from us within 7 days, please contact the clinic through Go Try It Onhart or phone. If you have a critical or abnormal lab result, we will notify you by phone as soon as possible.  Submit refill requests through UpCloo or call your pharmacy and they will forward the refill request to us. Please allow 3 business days for your refill to be completed.          If you need to speak with a  for additional information , please call: [phone]             Additional Information About Your Visit        UpCloo Information     UpCloo lets you send messages to your doctor, view your test results, renew your prescriptions, schedule appointments and more. To sign up, go to [email or web address] . Click on \"Log in\" on the left side of the screen, which will take you to the Welcome page. Then click on \"Sign up Now\" on the right side of the page.     You will be asked to enter the access code listed below, as well as some personal information. Please " "follow the directions to create your username and password.     Your access code is: [email or web address]  Expires: 2018 11:08 PM     Your access code will  in 90 days. If you need help or a new code, please call your [place] clinic or [phone].        Care EveryWhere ID     This is your Care EveryWhere ID. This could be used by other organizations to access your [place] medical records  [id number]        Your Vitals Were     Pulse Temperature Respirations Height Last Period Pulse Oximetry    70 97.2  F (36.2  C) (Tympanic) 28 4' 10.5\" (1.486 m) 2009 96%    BMI (Body Mass Index)                   45.24 kg/m2            Blood Pressure from Last 3 Encounters:   17 129/64   17 132/63   17 155/81    Weight from Last 3 Encounters:   17 220 lb 3.2 oz (99.9 kg)   17 220 lb 8 oz (100 kg)   17 219 lb (99.3 kg)              We Performed the Following     CBC with platelets differential     Comprehensive metabolic panel (BMP + Alb, Alk Phos, ALT, AST, Total. Bili, TP)     INR          Today's Medication Changes          These changes are accurate as of: 17  1:42 PM.  If you have any questions, ask your nurse or doctor.               Start taking these medicines.        Dose/Directions    metoclopramide 10 MG tablet   Commonly known as:  REGLAN   Used for:  Flatulence, eructation, and gas pain   Started by:  Kd Leong MD        Dose:  10 mg   Take 1 tablet (10 mg) by mouth 4 times daily as needed   Quantity:  40 tablet   Refills:  0            Where to get your medicines      These medications were sent to [id number] [place], [place] - [address]  [address] [address]    Hours:  Closed 10-16-08 business to [facility] Phone:  [phone]     metoclopramide 10 MG tablet                Primary Care Provider Office Phone # Fax #    Kd Leong [phone]       [facility] [address] [facility] [address]        Equal Access to Services     ANTIONETTE IRBY : Hadii jose ku kristal Sokateyali, waaxda luqadaha, qaybta kaalmada jasonkieranfrancis, waxmary ellen clare ramonorth agustinestebanantelmo craig. So [facility] [phone].    ATENCIÓN: Si habla español, tiene a terry disposición servicios gratuitos de asistencia lingüística. Carlosame al [phone].    We comply with applicable federal civil rights laws and [place] laws. We do not discriminate on the basis of race, color, national origin, age, disability, sex, sexual orientation, or gender identity.            Thank you!     Thank you for choosing [facility]  for your care. Our goal is always to provide you with excellent care. Hearing back from our patients is one way we can continue to improve our services. Please take a few minutes to complete the written survey that you may receive in the mail after your visit with us. Thank you!             Your Updated Medication List - Protect others around you: Learn how to safely use, store and throw away your medicines at www.disposemymeds.org.          This list is accurate as of: 11/13/17  1:42 PM.  Always use your most recent med list.                   Brand Name Dispense Instructions for use Diagnosis    acetaminophen 500 MG tablet    TYLENOL     Take 500-1,000 mg by mouth every 6 hours as needed for mild pain        * albuterol 108 (90 BASE) MCG/ACT Inhaler    PROAIR HFA/PROVENTIL HFA/VENTOLIN HFA    3 Inhaler    Inhale 2 puffs into the lungs every 6 hours as needed for shortness of breath / dyspnea or wheezing    Mild persistent asthma without complication       * albuterol (2.5 MG/3ML) 0.083% neb solution     1 Box    Take 1 vial (2.5 mg) by nebulization every 6 hours as needed for shortness of breath / dyspnea or wheezing    Mild persistent asthma without complication       allopurinol 100 MG tablet    ZYLOPRIM    90 tablet    TAKE ONE TABLET BY MOUTH ONE TIME DAILY    Acute gouty arthritis       ARIPiprazole 5 MG  tablet    ABILIFY    180 tablet    Take 1.5 tablets (7.5 mg) by mouth At Bedtime    Major depressive disorder, recurrent episode, moderate (H)       buPROPion 150 MG 24 hr tablet    WELLBUTRIN XL    30 tablet    TAKE  ONE TABLET BY MOUTH EVERY DAY IN THE MORNING    COPD exacerbation (H)       * calcium citrate-vitamin D 315-200 MG-UNIT Tabs per tablet    CITRACAL     Take two tablet at dinner and one tablet at bedtime.        * CITRUS CALCIUM +D 315-250 MG-UNIT Tabs per tablet   Generic drug:  calcium citrate-vitamin D           EPINEPHrine 0.3 MG/0.3ML injection 2-pack    EPIPEN/ADRENACLICK/or ANY BX GENERIC EQUIV    0.6 mL    Inject 0.3 mLs (0.3 mg) into the muscle once as needed for anaphylaxis    Anaphylactic reaction to bee sting, undetermined intent, subsequent encounter       furosemide 20 MG tablet    LASIX    180 tablet    TAKE ONE TABLET BY MOUTH TWICE DAILY    Lymphedema of both lower extremities       gabapentin 300 MG capsule    NEURONTIN    90 capsule    Take 1 capsule (300 mg) by mouth 3 times daily    Major depressive disorder, recurrent episode, moderate (H), Restless leg syndrome       lamoTRIgine 200 MG tablet    LaMICtal    90 tablet    Take 1 tablet (200 mg) by mouth daily    Major depressive disorder, recurrent episode, moderate (H)       levothyroxine 150 MCG tablet    SYNTHROID/LEVOTHROID    90 tablet    TAKE ONE TABLET BY MOUTH DAILY    Hypothyroidism, unspecified type       metoclopramide 10 MG tablet    REGLAN    40 tablet    Take 1 tablet (10 mg) by mouth 4 times daily as needed    Flatulence, eructation, and gas pain       mometasone-formoterol 100-5 MCG/ACT oral inhaler    DULERA    13 g    Inhale 2 puffs into the lungs 2 times daily    Mild persistent asthma without complication       omeprazole 20 MG CR capsule    priLOSEC    180 capsule    TAKE 1 CAPSULE (20 MG) BY MOUTH 2 TIMES DAILY    Gastroesophageal reflux disease without esophagitis       * order for DME      Equipment being  "ordered: CPAP AIRSENSE 10 5-18 CM H20 # [id number]   DN# [id number]        * order for DME     1 Month    Equipment being ordered: INCONTINENCE PADS  QID    Other urinary incontinence       * order for DME     60 Month    Equipment being ordered: DEPENDS SIZE LARGE    Mixed incontinence       * order for DME     2 Units    Equipment being ordered: [id number]\" x2pair   \"20-30mmHg Farrow Hybrid Liners\" x 2 pair    Peripheral edema       simvastatin 20 MG tablet    ZOCOR    90 tablet    TAKE ONE TABLET BY MOUTH AT BEDTIME    Hypercholesteremia       traZODone 150 MG tablet    DESYREL    90 tablet    Take 1 tablet (150 mg) by mouth At Bedtime    Major depressive disorder, recurrent episode, moderate (H)       VITAMIN D (CHOLECALCIFEROL) PO      Take 1,000 Units by mouth daily        warfarin 5 MG tablet    COUMADIN    30 tablet    Take 5mg by mouth daily or As directed by Anticoagulation Clinic        * Notice:  This list has 8 medication(s) that are the same as other medications prescribed for you. Read the directions carefully, and ask your doctor or other care provider to review them with you.      "

## 2017-11-13 NOTE — PROGRESS NOTES
SUBJECTIVE:                                                    Bia Bill is a 51 year old female who presents to clinic today for the following health issues:

## 2017-11-13 NOTE — NURSING NOTE
"Chief Complaint   Patient presents with     Follow Up For     appointment on 11/08/2017       Initial /64 (BP Location: Right arm, Patient Position: Sitting, Cuff Size: Adult Large)  Pulse 70  Temp 97.2  F (36.2  C) (Tympanic)  Resp 28  Ht 4' 10.5\" (1.486 m)  Wt 220 lb 3.2 oz (99.9 kg)  LMP 09/27/2009  SpO2 96%  BMI 45.24 kg/m2 Estimated body mass index is 45.24 kg/(m^2) as calculated from the following:    Height as of this encounter: 4' 10.5\" (1.486 m).    Weight as of this encounter: 220 lb 3.2 oz (99.9 kg).  Medication Reconciliation: complete   Jennifer Harris CMA    "

## 2017-11-13 NOTE — PROGRESS NOTES
SUBJECTIVE:                                                    Bia Bill is a 51 year old female who presents to clinic today for the following health issues:    Chief Complaint   Patient presents with     Follow Up For     appointment on 11/08/2017     Wt Readings from Last 10 Encounters:   11/13/17 220 lb 3.2 oz (99.9 kg)   11/08/17 220 lb 8 oz (100 kg)   11/07/17 219 lb (99.3 kg)   10/25/17 219 lb (99.3 kg)   10/19/17 226 lb 14.4 oz (102.9 kg)   10/17/17 222 lb 6.4 oz (100.9 kg)   09/22/17 225 lb 3.2 oz (102.2 kg)   07/10/17 229 lb 11.2 oz (104.2 kg)   05/30/17 230 lb 3.2 oz (104.4 kg)   05/01/17 231 lb 8 oz (105 kg)       **She was in on 11/08/2017 because she was not feeling well after having a colonoscopy the day prior. She says she is still not feeling very well.     HAs bottle of diet coke she is drinking.  Discussed diet.  She is mostly okay with foods she reports   But mostly drinking soda pop. Discussed water instead    Problem list and histories reviewed & adjusted, as indicated.  Additional history: *    Patient Active Problem List   Diagnosis     Mild persistent asthma     Polyneuropathy in other diseases classified elsewhere (H)     DVT (deep venous thrombosis) (H)     Alcohol abuse, in remission     SECONDARY POLYCYTHEMIA     Chondromalacia of patella     Sensorineural hearing loss, asymmetrical     Developmental reading disorder     Esophageal reflux     Hypothyroidism     Fatty liver     GILES (Obstructive Sleep Apnea)-Moderate (AHI 16)     RLS (restless legs syndrome)     CARDIOVASCULAR SCREENING; LDL GOAL LESS THAN 100     Smoker     Erythrocytosis     Moderate mixed bipolar I disorder (H)     Personality disorder, depressive     COPD (chronic obstructive pulmonary disease) (H)     Current use of long term anticoagulation     Rosacea     Health Care Home     DDD (degenerative disc disease), cervical     Benign neoplasm of colon (POLYPOSIS)     Stroke (H)     Somatization disorder      Sebaceous cyst of right axilla     HTN, goal below 140/90     Left leg pain     Vitamin D deficiency     Long term current use of anticoagulant therapy     Morbid obesity (H)     PVD (peripheral vascular disease) with claudication (H)     Cyst of left ovary     Morbid obesity with alveolar hypoventilation (H)     GI bleed     Severe episode of recurrent major depressive disorder (H)     Past Surgical History:   Procedure Laterality Date     BILIARY STENT SINGLE USE COV      3 stints in left leg     BONE MARROW BIOPSY, BONE SPECIMEN, NEEDLE/TROCAR N/A 11/17/2014    Procedure: BIOPSY BONE MARROW;  Surgeon: Hay Aaron MD;  Location: [facility]     D & C  10/26/09    with uterine ablation     ESOPHAGOSCOPY, GASTROSCOPY, DUODENOSCOPY (EGD), COMBINED  4/21/2014    Procedure: Gastroscopy;  Surgeon: Moris Thomas MD;  Location: [facility]     HYSTERECTOMY, PAP NO LONGER INDICATED  1-4-2010     LITHOTRIPSY  2004    Lithotrypsy       Social History   Substance Use Topics     Smoking status: Current Every Day Smoker     Packs/day: 0.50     Years: 34.00     Types: Cigarettes     Smokeless tobacco: Never Used      Comment: started at age 11.  Quit during pregnancyX4.  0.5 ppd (6/2016)     Alcohol use No      Comment: quit 1995     Family History   Problem Relation Age of Onset     Hypertension Mother      DIABETES Mother      Blood Disease Mother      HEART DISEASE Mother      chf     CEREBROVASCULAR DISEASE Mother      Hypertension Father      CANCER Father      lung cancer     Allergies Sister      Depression Sister      Hypertension Sister              ROS:  Constitutional, HEENT, cardiovascular, pulmonary, gi and gu systems are negative, except as otherwise noted.  She is urinating normally  OBJECTIVE:                                                    LMP 09/27/2009 There is no height or weight on file to calculate BMI.   GENERAL: healthy, alert, well nourished, well hydrated, no distress  HENT: ear canals-  normal; TMs- normal; Nose- normal; Mouth- no ulcers, no lesions  NECK: no tenderness, no adenopathy, no asymmetry, no masses, no stiffness; thyroid- normal to palpation  RESP: lungs clear to auscultation - no rales, no rhonchi, no wheezes  CV: regular rates and rhythm, normal S1 S2, no S3 or S4 and no murmur, no click or rub -  ABDOMEN: soft, no tenderness,moderately distended but soft.        ASSESSMENT/PLAN:                                                       nausea without obstruction or ileus  Post procedure  Will check labs,  Clear liquid diet and reglan 4 times a day as needed      No diet coke or oter soda  Water, should b emain beverage.       reports that she has been smoking Cigarettes.  She has a 17.00 pack-year smoking history. She has never used smokeless tobacco.    [facility]

## 2017-11-14 ENCOUNTER — ANTICOAGULATION THERAPY VISIT (OUTPATIENT)
Dept: ANTICOAGULATION | Facility: CLINIC | Age: 52
End: 2017-11-14
Payer: COMMERCIAL

## 2017-11-14 DIAGNOSIS — Z79.01 LONG TERM CURRENT USE OF ANTICOAGULANT THERAPY: ICD-10-CM

## 2017-11-14 DIAGNOSIS — I73.9 PVD (PERIPHERAL VASCULAR DISEASE) WITH CLAUDICATION (H): ICD-10-CM

## 2017-11-14 LAB
ALBUMIN SERPL-MCNC: 3.7 G/DL (ref 3.4–5)
ALP SERPL-CCNC: 117 U/L (ref 40–150)
ALT SERPL W P-5'-P-CCNC: 47 U/L (ref 0–50)
ANION GAP SERPL CALCULATED.3IONS-SCNC: 7 MMOL/L (ref 3–14)
AST SERPL W P-5'-P-CCNC: 29 U/L (ref 0–45)
BASOPHILS # BLD AUTO: 0 10E9/L (ref 0–0.2)
BASOPHILS NFR BLD AUTO: 0.4 %
BILIRUB SERPL-MCNC: 0.4 MG/DL (ref 0.2–1.3)
BUN SERPL-MCNC: 10 MG/DL (ref 7–30)
CALCIUM SERPL-MCNC: 8.6 MG/DL (ref 8.5–10.1)
CHLORIDE SERPL-SCNC: 102 MMOL/L (ref 94–109)
CO2 SERPL-SCNC: 28 MMOL/L (ref 20–32)
CREAT SERPL-MCNC: 0.78 MG/DL (ref 0.52–1.04)
DIFFERENTIAL METHOD BLD: ABNORMAL
EOSINOPHIL # BLD AUTO: 0.1 10E9/L (ref 0–0.7)
EOSINOPHIL NFR BLD AUTO: 1.2 %
ERYTHROCYTE [DISTWIDTH] IN BLOOD BY AUTOMATED COUNT: 21.7 % (ref 10–15)
GFR SERPL CREATININE-BSD FRML MDRD: 78 ML/MIN/1.7M2
GLUCOSE SERPL-MCNC: 83 MG/DL (ref 70–99)
HCT VFR BLD AUTO: 60.5 % (ref 35–47)
HGB BLD-MCNC: 19.5 G/DL (ref 11.7–15.7)
IMM GRANULOCYTES # BLD: 0 10E9/L (ref 0–0.4)
IMM GRANULOCYTES NFR BLD: 0.2 %
LYMPHOCYTES # BLD AUTO: 1.6 10E9/L (ref 0.8–5.3)
LYMPHOCYTES NFR BLD AUTO: 31.5 %
MCH RBC QN AUTO: 29.1 PG (ref 26.5–33)
MCHC RBC AUTO-ENTMCNC: 32.2 G/DL (ref 31.5–36.5)
MCV RBC AUTO: 90 FL (ref 78–100)
MONOCYTES # BLD AUTO: 0.4 10E9/L (ref 0–1.3)
MONOCYTES NFR BLD AUTO: 8.4 %
NEUTROPHILS # BLD AUTO: 2.9 10E9/L (ref 1.6–8.3)
NEUTROPHILS NFR BLD AUTO: 58.3 %
PLATELET # BLD AUTO: 101 10E9/L (ref 150–450)
POTASSIUM SERPL-SCNC: 3.4 MMOL/L (ref 3.4–5.3)
PROT SERPL-MCNC: 6.7 G/DL (ref 6.8–8.8)
RBC # BLD AUTO: 6.71 10E12/L (ref 3.8–5.2)
SODIUM SERPL-SCNC: 137 MMOL/L (ref 133–144)
WBC # BLD AUTO: 5 10E9/L (ref 4–11)

## 2017-11-14 PROCEDURE — 99207 ZZC NO CHARGE NURSE ONLY: CPT

## 2017-11-14 ASSESSMENT — ASTHMA QUESTIONNAIRES: ACT_TOTALSCORE: 15

## 2017-11-14 NOTE — MR AVS SNAPSHOT
Bia Bill   11/14/2017   Anticoagulation Therapy Visit    Description:  51 year old female   Provider:  Mireille Torres, RN   Department:  [facility]           INR as of 11/14/2017     Today's INR 1.12! (11/13/2017)      Anticoagulation Summary as of 11/14/2017     INR goal 2.0-2.5   Today's INR 1.12! (11/13/2017)   Full instructions 11/14: 10 mg; 11/15: 10 mg; 11/16: 10 mg   Next INR check 11/17/2017    Indications   Long term current use of anticoagulant therapy [Z79.01]  DVT (deep venous thrombosis) (H) [I82.409]  PVD (peripheral vascular disease) with claudication (H) [I73.9]         November 2017 Details    Sun Mon Tue Wed Thu Fri Sat        1               2               3               4                 5               6               7               8               9               10               11                 12               13               14      10 mg   See details      15      10 mg         16      10 mg         17            18                 19               20               21               22               23               24               25                 26               27               28               29               30                  Date Details   11/14 This INR check       Date of next INR:  11/17/2017         How to take your warfarin dose     To take:  10 mg Take 2 of the 5 mg tablets.

## 2017-11-14 NOTE — PROGRESS NOTES
ANTICOAGULATION FOLLOW-UP CLINIC VISIT    Patient Name:  Bia Bill  Date:  11/14/2017  Contact Type:  Telephone/ writer spoke with Norah on phone    SUBJECTIVE:     Patient Findings     Positives Change in medications (reglan on 11-13-17 for flatulence and gas), Initiation of therapy (resumption of warfarin on 11-9-17 after holding for 3 weeks)    Comments Patient will recheck INR at [facility] lab on 11-17-17. She needs to resume previous maintenance dose at 10 mg daily if she hopes to be in range of 2-2.5.           OBJECTIVE    INR   Date Value Ref Range Status   11/13/2017 1.12 0.86 - 1.14 Final       ASSESSMENT / PLAN  INR assessment SUB    Recheck INR In: 3 DAYS    INR Location Clinic lab     Anticoagulation Summary as of 11/14/2017     INR goal 2.0-2.5   Today's INR 1.12! (11/13/2017)   Maintenance plan No maintenance plan   Full instructions 11/14: 10 mg; 11/15: 10 mg; 11/16: 10 mg   Plan last modified Brenna Rausch RN (11/9/2017)   Next INR check 11/17/2017   Priority INR   Target end date Indefinite    Indications   Long term current use of anticoagulant therapy [Z79.01]  DVT (deep venous thrombosis) (H) [I82.409]  PVD (peripheral vascular disease) with claudication (H) [I73.9]         Anticoagulation Episode Summary     INR check location     Preferred lab     Send INR reminders to [facility]    Comments * lab draw due to elevated hematocrit (fingerstick meters don't work). LEFT LE. STENT x 3 LEFT UPPER LEG. Previous arterial clot. New goal range 2-2.5 starting 11/6/17.      Anticoagulation Care Providers     Provider Role Specialty Phone number    Kd Leong MD [facility] [phone]            See the Encounter Report to view Anticoagulation Flowsheet and Dosing Calendar (Go to Encounters tab in chart review, and find the Anticoagulation Therapy Visit)        Mireille Torres RN

## 2017-11-17 ENCOUNTER — ANTICOAGULATION THERAPY VISIT (OUTPATIENT)
Dept: ANTICOAGULATION | Facility: CLINIC | Age: 52
End: 2017-11-17
Payer: COMMERCIAL

## 2017-11-17 DIAGNOSIS — I82.409 DVT (DEEP VENOUS THROMBOSIS) (H): ICD-10-CM

## 2017-11-17 DIAGNOSIS — I73.9 PERIPHERAL VASCULAR DISEASE WITH CLAUDICATION (H): ICD-10-CM

## 2017-11-17 DIAGNOSIS — Z79.01 LONG TERM CURRENT USE OF ANTICOAGULANT THERAPY: ICD-10-CM

## 2017-11-17 DIAGNOSIS — D75.1 ERYTHROCYTOSIS: ICD-10-CM

## 2017-11-17 DIAGNOSIS — I73.9 PVD (PERIPHERAL VASCULAR DISEASE) WITH CLAUDICATION (H): ICD-10-CM

## 2017-11-17 LAB
BASOPHILS # BLD AUTO: 0 10E9/L (ref 0–0.2)
BASOPHILS NFR BLD AUTO: 0 %
DIFFERENTIAL METHOD BLD: ABNORMAL
EOSINOPHIL # BLD AUTO: 0 10E9/L (ref 0–0.7)
EOSINOPHIL NFR BLD AUTO: 1.1 %
ERYTHROCYTE [DISTWIDTH] IN BLOOD BY AUTOMATED COUNT: 19.4 % (ref 10–15)
HCT VFR BLD AUTO: 61.8 % (ref 35–47)
HGB BLD-MCNC: 18.6 G/DL (ref 11.7–15.7)
IMM GRANULOCYTES # BLD: 0 10E9/L (ref 0–0.4)
IMM GRANULOCYTES NFR BLD: 0 %
INR PPP: 1.67 (ref 0.86–1.14)
LYMPHOCYTES # BLD AUTO: 1.8 10E9/L (ref 0.8–5.3)
LYMPHOCYTES NFR BLD AUTO: 32.1 %
MCH RBC QN AUTO: 29 PG (ref 26.5–33)
MCHC RBC AUTO-ENTMCNC: 30.1 G/DL (ref 31.5–36.5)
MCV RBC AUTO: 96 FL (ref 78–100)
MONOCYTES # BLD AUTO: 0.6 10E9/L (ref 0–1.3)
MONOCYTES NFR BLD AUTO: 9.2 %
NEUTROPHILS # BLD AUTO: 3.3 10E9/L (ref 1.6–8.3)
NEUTROPHILS NFR BLD AUTO: 57.6 %
PLATELET # BLD AUTO: 114 10E9/L (ref 150–450)
RBC # BLD AUTO: 6.42 10E12/L (ref 3.8–5.2)
WBC # BLD AUTO: 5.4 10E9/L (ref 4–11)

## 2017-11-17 PROCEDURE — 85610 PROTHROMBIN TIME: CPT | Performed by: FAMILY MEDICINE

## 2017-11-17 PROCEDURE — 36415 COLL VENOUS BLD VENIPUNCTURE: CPT | Performed by: FAMILY MEDICINE

## 2017-11-17 PROCEDURE — 85025 COMPLETE CBC W/AUTO DIFF WBC: CPT | Performed by: FAMILY MEDICINE

## 2017-11-17 PROCEDURE — 99207 ZZC NO CHARGE NURSE ONLY: CPT

## 2017-11-17 RX ORDER — WARFARIN SODIUM 5 MG/1
TABLET ORAL
Qty: 30 TABLET | COMMUNITY
Start: 2017-11-17 | End: 2017-12-27

## 2017-11-17 NOTE — MR AVS SNAPSHOT
Bia S Fly   11/17/2017   Anticoagulation Therapy Visit    Description:  51 year old female   Provider:  Angelina Nj, RN   Department:  [facility]           INR as of 11/17/2017     Today's INR 1.67!      Anticoagulation Summary as of 11/17/2017     INR goal 2.0-2.5   Today's INR 1.67!   Full instructions 11/17: 7.5 mg; 11/18: 5 mg; 11/19: 7.5 mg; 11/20: 5 mg   Next INR check 11/21/2017    Indications   Long term current use of anticoagulant therapy [Z79.01]  DVT (deep venous thrombosis) (H) [I82.409]  PVD (peripheral vascular disease) with claudication (H) [I73.9]         November 2017 Details    Sun Mon Tue Wed Thu Fri Sat        1               2               3               4                 5               6               7               8               9               10               11                 12               13               14               15               16               17      7.5 mg   See details      18      5 mg           19      7.5 mg         20      5 mg         21            22               23               24               25                 26               27               28               29               30                  Date Details   11/17 This INR check       Date of next INR:  11/21/2017         How to take your warfarin dose     To take:  5 mg Take 1 of the 5 mg tablets.    To take:  7.5 mg Take 1.5 of the 5 mg tablets.

## 2017-11-17 NOTE — PROGRESS NOTES
ANTICOAGULATION FOLLOW-UP CLINIC VISIT    Patient Name:  Bia Bill  Date:  11/17/2017  Contact Type:  Telephone    SUBJECTIVE:     Patient Findings     Positives Initiation of therapy    Comments Patient had 50mg in the previous 7 days, will increase dose to 55mg by the next INR check on Tuesday (~10% increase). Writer had requested she come to the lab on Monday, but she has to arrange transportation. She will already be here for an appt on Tuesday so she will go to the lab on that day instead.              OBJECTIVE    INR   Date Value Ref Range Status   11/17/2017 1.67 (H) 0.86 - 1.14 Final       ASSESSMENT / PLAN  No question data found.  Anticoagulation Summary as of 11/17/2017     INR goal 2.0-2.5   Today's INR 1.67!   Maintenance plan No maintenance plan   Full instructions 11/17: 7.5 mg; 11/18: 5 mg; 11/19: 7.5 mg; 11/20: 5 mg   Plan last modified Brenna Rausch RN (11/9/2017)   Next INR check 11/21/2017   Priority INR   Target end date Indefinite    Indications   Long term current use of anticoagulant therapy [Z79.01]  DVT (deep venous thrombosis) (H) [I82.409]  PVD (peripheral vascular disease) with claudication (H) [I73.9]         Anticoagulation Episode Summary     INR check location     Preferred lab     Send INR reminders to [facility]    Comments * lab draw due to elevated hematocrit (fingerstick meters don't work). LEFT LE. STENT x 3 LEFT UPPER LEG. Previous arterial clot. New goal range 2-2.5 starting 11/6/17.      Anticoagulation Care Providers     Provider Role Specialty Phone number    Kd Leong MD [facility] [phone]            See the Encounter Report to view Anticoagulation Flowsheet and Dosing Calendar (Go to Encounters tab in chart review, and find the Anticoagulation Therapy Visit)        Angelina Nj RN

## 2017-11-21 ENCOUNTER — TELEPHONE (OUTPATIENT)
Dept: FAMILY MEDICINE | Facility: CLINIC | Age: 52
End: 2017-11-21

## 2017-11-21 ENCOUNTER — OFFICE VISIT (OUTPATIENT)
Dept: PSYCHOLOGY | Facility: CLINIC | Age: 52
End: 2017-11-21
Payer: COMMERCIAL

## 2017-11-21 ENCOUNTER — ANTICOAGULATION THERAPY VISIT (OUTPATIENT)
Dept: ANTICOAGULATION | Facility: CLINIC | Age: 52
End: 2017-11-21
Payer: COMMERCIAL

## 2017-11-21 DIAGNOSIS — I73.9 PVD (PERIPHERAL VASCULAR DISEASE) WITH CLAUDICATION (H): Chronic | ICD-10-CM

## 2017-11-21 DIAGNOSIS — I82.409 DVT (DEEP VENOUS THROMBOSIS) (H): ICD-10-CM

## 2017-11-21 DIAGNOSIS — Z79.01 LONG TERM CURRENT USE OF ANTICOAGULANT THERAPY: ICD-10-CM

## 2017-11-21 DIAGNOSIS — G63 POLYNEUROPATHY IN OTHER DISEASES CLASSIFIED ELSEWHERE (H): Primary | Chronic | ICD-10-CM

## 2017-11-21 DIAGNOSIS — F31.62 MODERATE MIXED BIPOLAR I DISORDER (H): Primary | ICD-10-CM

## 2017-11-21 DIAGNOSIS — I73.9 PVD (PERIPHERAL VASCULAR DISEASE) WITH CLAUDICATION (H): ICD-10-CM

## 2017-11-21 DIAGNOSIS — Z79.01 LONG TERM CURRENT USE OF ANTICOAGULANT THERAPY: Chronic | ICD-10-CM

## 2017-11-21 DIAGNOSIS — I73.9 PERIPHERAL VASCULAR DISEASE WITH CLAUDICATION (H): ICD-10-CM

## 2017-11-21 DIAGNOSIS — I73.9 PVD (PERIPHERAL VASCULAR DISEASE) WITH CLAUDICATION (H): Primary | Chronic | ICD-10-CM

## 2017-11-21 LAB
ERYTHROCYTE [DISTWIDTH] IN BLOOD BY AUTOMATED COUNT: 21.6 % (ref 10–15)
HCT VFR BLD AUTO: 61.2 % (ref 35–47)
HGB BLD-MCNC: 19.8 G/DL (ref 11.7–15.7)
INR PPP: 1.61 (ref 0.86–1.14)
MCH RBC QN AUTO: 29.4 PG (ref 26.5–33)
MCHC RBC AUTO-ENTMCNC: 32.4 G/DL (ref 31.5–36.5)
MCV RBC AUTO: 91 FL (ref 78–100)
PLATELET # BLD AUTO: 104 10E9/L (ref 150–450)
RBC # BLD AUTO: 6.73 10E12/L (ref 3.8–5.2)
WBC # BLD AUTO: 5.1 10E9/L (ref 4–11)

## 2017-11-21 PROCEDURE — 90834 PSYTX W PT 45 MINUTES: CPT | Performed by: PSYCHOLOGIST

## 2017-11-21 PROCEDURE — 36415 COLL VENOUS BLD VENIPUNCTURE: CPT | Performed by: FAMILY MEDICINE

## 2017-11-21 PROCEDURE — 99207 ZZC NO CHARGE NURSE ONLY: CPT

## 2017-11-21 PROCEDURE — 85027 COMPLETE CBC AUTOMATED: CPT | Performed by: FAMILY MEDICINE

## 2017-11-21 PROCEDURE — 85610 PROTHROMBIN TIME: CPT | Performed by: FAMILY MEDICINE

## 2017-11-21 NOTE — TELEPHONE ENCOUNTER
Dr. Leong: [facility] is requesting standing orders for cbc.  Mundo Conde RN

## 2017-11-21 NOTE — MR AVS SNAPSHOT
MRN:[id number]                      After Visit Summary   11/21/2017    Bia Bill    MRN: [id number]           Visit Information        Provider Department      11/21/2017 9:30 AM Arturo Kd CHAWLA [facility] Generic      Your next 10 appointments already scheduled     Dec 12, 2017 10:30 AM CST   Return Visit with Kd Morris   [facility] ([facility])    [address] [address]   [phone]            Dec 15, 2017  1:30 PM CST   LAB with [facility] ([facility])    [address] [address]   [phone]           Please do not eat 10-12 hours before your appointment if you are coming in fasting for labs on lipids, cholesterol, or glucose (sugar). This does not apply to pregnant women. Water, hot tea and black coffee (with nothing added) are okay. Do not drink other fluids, diet soda or chew gum.            Dec 15, 2017  1:35 PM CST   LAB with [facility] ([facility])    [address] [address]   [phone]           Please do not eat 10-12 hours before your appointment if you are coming in fasting for labs on lipids, cholesterol, or glucose (sugar). This does not apply to pregnant women. Water, hot tea and black coffee (with nothing added) are okay. Do not drink other fluids, diet soda or chew gum.            Dec 27, 2017 10:30 AM CST   Return Visit with Kd [place]insifton   [facility] ([facility])    [address] [address]   [phone]            Jan 12, 2018  1:30 PM CST   LAB with [facility] ([facility])    [address] [address]   [phone]           Please do not eat 10-12 hours before your appointment if you are coming in fasting for labs on lipids, cholesterol, or glucose  (sugar). This does not apply to pregnant women. Water, hot tea and black coffee (with nothing added) are okay. Do not drink other fluids, diet soda or chew gum.            Feb 09, 2018  1:30 PM CST   LAB with [facility] ([facility])    [address] [address]   [phone]           Please do not eat 10-12 hours before your appointment if you are coming in fasting for labs on lipids, cholesterol, or glucose (sugar). This does not apply to pregnant women. Water, hot tea and black coffee (with nothing added) are okay. Do not drink other fluids, diet soda or chew gum.            Mar 16, 2018  1:30 PM CDT   LAB with [facility] ([facility])    [address] [address]   [phone]           Please do not eat 10-12 hours before your appointment if you are coming in fasting for labs on lipids, cholesterol, or glucose (sugar). This does not apply to pregnant women. Water, hot tea and black coffee (with nothing added) are okay. Do not drink other fluids, diet soda or chew gum.            Mar 16, 2018  1:35 PM CDT   LAB with [facility] ([facility])    [address] [address]   [phone]           Please do not eat 10-12 hours before your appointment if you are coming in fasting for labs on lipids, cholesterol, or glucose (sugar). This does not apply to pregnant women. Water, hot tea and black coffee (with nothing added) are okay. Do not drink other fluids, diet soda or chew gum.            Mar 23, 2018  1:00 PM CDT   Return Visit with Joshua Watts MD   [facility] ([facility])    [facility]  [address]  [facility] [address]   [phone]            Apr 06, 2018  1:30 PM CDT   LAB with [facility] ([facility])    [address]  [address]  [facility] [address]   [phone]           Please do not eat 10-12 hours before your appointment if you are coming in fasting for labs on lipids, cholesterol, or glucose (sugar). This does not apply to pregnant women. Water, hot tea and black coffee (with nothing added) are okay. Do not drink other fluids, diet soda or chew gum.              [employer] Information     [employer] lets you send messages to your doctor, view your test results, renew your prescriptions, schedule appointments and more. To sign up, go to [email or web address] . Click on \"Log in\" on the left side of the screen, which will take you to the Welcome page. Then click on \"Sign up Now\" on the right side of the page.     You will be asked to enter the access code listed below, as well as some personal information. Please follow the directions to create your username and password.     Your access code is: [email or web address]  Expires: 2018 11:08 PM     Your access code will  in 90 days. If you need help or a new code, please call your [facility] clinic or [phone].        Care EveryWhere ID     This is your Care EveryWhere ID. This could be used by other organizations to access your [facility] medical records  [id number]        Equal Access to Services     [facility] SUREKHA : Hadii jose Clark, waaxda luqadaha, qaybta kaalmada adeuzair, nitin craig. So [facility] [phone].    ATENCIÓN: Si habla español, tiene a terry disposición servicios gratuitos de asistencia lingüística. Llame al [phone].    We comply with applicable federal civil rights laws and [place] laws. We do not discriminate on the basis of race, color, national origin, age, disability, sex, sexual orientation, or gender identity.            "

## 2017-11-21 NOTE — PROGRESS NOTES
Patient should resume prior maintenance dose of 10 mg daily. Lower doses are not keeping her in range. She was very close to 2-2.5 INR at that prior dosing. Left non-detailed VM to call [facility] back to discuss.  Mireille Torres, VIVIN, RN

## 2017-11-21 NOTE — PROGRESS NOTES
ANTICOAGULATION FOLLOW-UP CLINIC VISIT    Patient Name:  Bia Bill  Date:  11/21/2017  Contact Type:  Telephone    SUBJECTIVE:     Patient Findings     Positives Initiation of therapy (re-establishing maintenance dose after INR goal range changed)    Comments Patient had 55mg in the previous 7 days, will increase dose to 65mg by the next INR check in 7-8 days (~18% increase). Her previous dose was 70mg/week but that is a 27% increase. Patient's goal range was lowered to due rectal bleeding.     Writer requested patient to return in about a week. She has to call and arrange a ride so she will see what times/day they have available.              OBJECTIVE    INR   Date Value Ref Range Status   11/21/2017 1.61 (H) 0.86 - 1.14 Final     Comment:     Special tube used to correct for high hematocrit       ASSESSMENT / PLAN  No question data found.  Anticoagulation Summary as of 11/21/2017     INR goal 2.0-2.5   Today's INR 1.61!   Maintenance plan 7.5 mg (5 mg x 1.5) on Mon, Fri; 10 mg (5 mg x 2) all other days   Full instructions 7.5 mg on Mon, Fri; 10 mg all other days   Weekly total 65 mg   Plan last modified Angelina Nj RN (11/21/2017)   Next INR check 11/27/2017   Priority INR   Target end date     Indications   Long term current use of anticoagulant therapy [Z79.01]  DVT (deep venous thrombosis) (H) [I82.409]  PVD (peripheral vascular disease) with claudication (H) [I73.9]         Anticoagulation Episode Summary     INR check location     Preferred lab     Send INR reminders to [facility]    Comments * lab draw due to elevated hematocrit (fingerstick meters don't work). LEFT LE. STENT x 3 LEFT UPPER LEG. Previous arterial clot. New goal range 2-2.5 starting 11/6/17.      Anticoagulation Care Providers     Provider Role Specialty Phone number    Kd Leong MD [facility] [phone]            See the Encounter Report to view Anticoagulation Flowsheet and Dosing  Calendar (Go to Encounters tab in chart review, and find the Anticoagulation Therapy Visit)        Angelina Nj RN

## 2017-11-21 NOTE — PROGRESS NOTES
Progress Note  Disclaimer: This note consists of symbols derived from keyboarding, dictation and/or voice recognition software. As a result, there may be errors in the script that have gone undetected. Please consider this when interpreting information found in this chart.    Client Name: Bia Bill  Date:  11/21/2017         Service Type: Individual      Session Start Time:  9:30 AM  Session End Time: 10:15 AM      Session Length: 45     Session #: 20     Attendees: Client attended alone    Treatment Plan Last Reviewed: 10/24/2017  PHQ-9 / CHIN-7 : 10     DATA   Client reported she took a fall down her outside steps over the weekend. This has been about the 12th fall for her this year. Her family is considering getting her into assisted living, however at 51 the client feels this would be giving up her freedom much too early. She continues to have difficulties with a freeloading roommate that she cannot effect until April.     Progress Since Last Session (Related to Symptoms / Goals / Homework):   Symptoms: Worsening    Homework: Achieved / completed to satisfaction      Episode of Care Goals: Satisfactory progress - ACTION (Actively working towards change); Intervened by reinforcing change plan / affirming steps taken     Current / Ongoing Stressors and Concerns:   Recurrent depression  family relational problems, medical problems, chronic pain, trauma history Roommate problems.     Treatment Objective(s) Addressed in This Session:   Increase interest, engagement, and pleasure in doing things  Decrease frequency and intensity of feeling down, depressed, hopeless       Intervention:   CBT: Behavioral activation reviewed safety and provided in encouragement for client's self advocacy.     ASSESSMENT: Current Emotional / Mental Status (status of significant symptoms):   Risk status (Self / Other harm or suicidal ideation)   Client denies current fears or concerns for  personal safety.   Client denies current or recent suicidal ideation or behaviors.   Client denies current or recent homicidal ideation or behaviors.   Client denies current or recent self injurious behavior or ideation.   Client denies other safety concerns.   A safety and risk management plan has not been developed at this time, however client was given the after-hours number / 911 should there be a change in any of these risk factors.     Appearance:   Appropriate    Eye Contact:   Good    Psychomotor Behavior: Normal    Attitude:   Cooperative    Orientation:   All   Speech    Rate / Production: Normal     Volume:  Normal    Mood:    Normal   Affect:    Appropriate    Thought Content:  Clear    Thought Form:  Coherent  Logical    Insight:    Good      Medication Review:   No changes to current psychiatric medication(s)     Medication Compliance:   Yes     Changes in Health Issues:   None reported     Chemical Use Review:   Substance Use: Chemical use reviewed, no active concerns identified      Tobacco Use: Client reports 5 cigarettes per day      Collateral Reports Completed:   Not Applicable    PLAN: (Client Tasks / Therapist Tasks / Other)  Client to continue With her self-care routine and maintenance of boundaries.   Maintain medication compliance and contact with psychiatry  Kd Morris                                                         ________________________________________________________________________    Treatment Plan    Client's Name: Bia Bill  YOB: 1965    Date: 10/24/2017      DSM5 Diagnoses: (Sustained by DSM5 Criteria Listed Above)  Diagnoses: 296.40 Bipolar I Disorder Current or Most Recent Episode Manic, Unspecified  Psychosocial & Contextual Factors: financial difficulties, chronic pain, roommate issues  WHODAS 2.0 (12 item)               This questionnaire asks about difficulties due to health conditions. Health conditions             include disease or  illnesses, other health problems that may be short or long lasting,             injuries, mental health or emotional problems, and problems with alcohol or drugs.                         Think back over the past 30 days and answer these questions, thinking about how much             difficulty you had doing the following activities. For each question, please [place] only             one response.      S1  Standing for long periods such as 30 minutes?  Mild =           2    S2  Taking care of household responsibilities?  Moderate =   3    S3  Learning a new task, for example, learning how to get to a new place?  Mild =           2    S4  How much of a problem do you have joining community activities (for example, festivals, [employer] or other activities) in the same way as anyone else can?  Moderate =   3    S5  How much have you been emotionally affected by your health problems?  Mild =           2        In the past 30 days, how much difficulty did you have in:    S6  Concentrating on doing something for ten minutes?  Mild =           2    S7  Walking a long distance such as a kilometer (or equivalent)?  Severe =       4    S8  Washing your whole body?  None =         1    S9  Getting dressed?  None =         1    S10  Dealing with people you do not know?  Moderate =   3    S11  Maintaining a friendship?  Severe =       4    S12  Your day to day work?  Moderate =   3       H1  Overall, in the past 30 days, how many days were these difficulties present?  Record number of days seven    H2  In the past 30 days, for how many days were you totally unable to carry out your usual activities or work because of any health condition?  Record number of days  seven    H3  In the past 30 days, not counting the days that you were totally unable, for how many days did you cut back or reduce your usual activities or work because of any health condition?  Record number of days five                   Referral /  Collaboration:  Referral to another professional/service is not indicated at this time..    Anticipated number of session or this episode of care: 15    Goals  1. Education- the Biopsychosocial model of depression  a. Client will be able to describe how depression is effecting them physically, emotionally and socially  2. Behavioral Activation  a. Client will learn to assess their depression on a day to day basis  b. Client will Identify two forms of exercise/activity and engage in them 3 times per week  c. Client will Identify 3 things that make them laugh, and engage in these 5 times per week.  d. Client will Identify 1-2Creative activities or hobbies  and engage in them 2 times per week  e. Client will identify music, movies, books that make them feel good and use them 3-4 times per week  3. Self-care  a. Client will identify 5 things they can do just for themselves  b. Client will take time for quiet, reflection, meditation 5 times per week  c. Client will Learn to set boundaries when appropriate  d. Client will Identify 2 individuals they can call on for support, distraction  4. Assessment of progress  a. Client will engage in assessment of their progress on a regular basis    Bipolar Disorder  Treatment plan:  1. Education- the Biopsychosocial model of Bipolar Disorder    a. Client will be able to describe in general terms what Bipolar Disorder  is and is not  b. Client will be able to describe how Bipolar Disorder  has affected their life in at least two different areas, such as school or work and Home/relationships  c. Clients parents/guardians or significant others will be provided information on what Bipolar Disorder  is and the ways it can affect relationships and be encouraged to be a part of clients treatment team.  2. Medication  a. Client will participate in medication evaluation for Bipolar Disorder  symptoms and follow medication recommendations.   3. Identification and management of  triggers  a. Client and therapist will examine patient s history to determine if there are predictable triggers for manic or depressive episodes (e.g. boredom, anger, family stress)  b. Client and therapist will develop means of diffusing these triggers (e.g. relaxation strategies, boundary setting, anger management)  4. Comorbid conditions   a. Client and therapist will asses for comorbid conditions (e.g. anxiety, depression, substance use). and add additional items to treatment plan as needed  5. Self-care  a. Client will identify 3 things they can do just for themselves  b. Client will take time for quiet, reflection, meditation 3 times per week  c. Client will Learn to set boundaries when appropriate  d. Client will Identify 2 individuals they can call on for support, distraction  5. Behavioral Activation  a. Client will Identify two forms of exercise/activity and engage in them 3 times per week  b. Client will Identify 2 things that make them laugh, and engage in these 3 times per week.  c. Client will Identify 2 Creative activities or hobbies  and engage in them 2 times per week  d. Client will identify music, movies, books that make them feel good and use them 3 times per week  6. Assessment of progress  a. Client will engage in assessment of their progress on a regular basis    Client has reviewed and agreed to the above plan.      Kd Morris  10/24/2017

## 2017-11-21 NOTE — MR AVS SNAPSHOT
Bia Bill   11/21/2017   Anticoagulation Therapy Visit    Description:  51 year old female   Provider:  Mireille Torres, RN   Department:  [facility]           INR as of 11/21/2017         The patient was not given dosing instructions in this encounter.      Anticoagulation Summary as of 11/21/2017         The patient was not given dosing instructions in this encounter.      November 2017 Details    Sun Mon Tue Wed Thu Fri Sat        1               2               3               4                 5               6               7               8               9               10               11                 12               13               14               15               16               17      7.5 mg   See details      18      5 mg           19      7.5 mg         20      5 mg         21            22               23               24               25                 26               27               28               29               30                  Date Details   11/17 This INR check       Date of next INR:  11/21/2017         How to take your warfarin dose     To take:  5 mg Take 1 of the 5 mg tablets.    To take:  7.5 mg Take 1.5 of the 5 mg tablets.

## 2017-11-27 ENCOUNTER — TELEPHONE (OUTPATIENT)
Dept: FAMILY MEDICINE | Facility: CLINIC | Age: 52
End: 2017-11-27

## 2017-11-27 DIAGNOSIS — D75.1 ERYTHROCYTOSIS: Primary | Chronic | ICD-10-CM

## 2017-11-27 NOTE — TELEPHONE ENCOUNTER
Patient needs standing order for Hgb and Hct . Patient has high hematocrit and we need to adjust INR tube depending on what the Hgb and Hct result is. Thank You!

## 2017-12-01 ENCOUNTER — APPOINTMENT (OUTPATIENT)
Dept: GENERAL RADIOLOGY | Facility: CLINIC | Age: 52
End: 2017-12-01
Attending: EMERGENCY MEDICINE
Payer: COMMERCIAL

## 2017-12-01 ENCOUNTER — ANTICOAGULATION THERAPY VISIT (OUTPATIENT)
Dept: ANTICOAGULATION | Facility: CLINIC | Age: 52
End: 2017-12-01
Payer: COMMERCIAL

## 2017-12-01 ENCOUNTER — HOSPITAL ENCOUNTER (EMERGENCY)
Facility: CLINIC | Age: 52
Discharge: HOME | End: 2017-12-01
Attending: FAMILY MEDICINE | Admitting: EMERGENCY MEDICINE
Payer: COMMERCIAL

## 2017-12-01 VITALS
BODY MASS INDEX: 46.39 KG/M2 | TEMPERATURE: 97.6 F | HEIGHT: 58 IN | DIASTOLIC BLOOD PRESSURE: 80 MMHG | SYSTOLIC BLOOD PRESSURE: 147 MMHG | RESPIRATION RATE: 16 BRPM | OXYGEN SATURATION: 95 % | WEIGHT: 221 LBS

## 2017-12-01 DIAGNOSIS — I82.409 DVT (DEEP VENOUS THROMBOSIS) (H): ICD-10-CM

## 2017-12-01 DIAGNOSIS — D75.1 ERYTHROCYTOSIS: Chronic | ICD-10-CM

## 2017-12-01 DIAGNOSIS — Z79.01 LONG TERM CURRENT USE OF ANTICOAGULANT THERAPY: ICD-10-CM

## 2017-12-01 DIAGNOSIS — I73.9 PVD (PERIPHERAL VASCULAR DISEASE) WITH CLAUDICATION (H): ICD-10-CM

## 2017-12-01 DIAGNOSIS — I73.9 PERIPHERAL VASCULAR DISEASE WITH CLAUDICATION (H): ICD-10-CM

## 2017-12-01 DIAGNOSIS — S93.401A SPRAIN OF RIGHT ANKLE, UNSPECIFIED LIGAMENT, INITIAL ENCOUNTER: ICD-10-CM

## 2017-12-01 LAB
HCT VFR BLD AUTO: 62.3 % (ref 35–47)
HGB BLD-MCNC: 20.3 G/DL (ref 11.7–15.7)
INR PPP: 2.19 (ref 0.86–1.14)

## 2017-12-01 PROCEDURE — 85014 HEMATOCRIT: CPT | Performed by: FAMILY MEDICINE

## 2017-12-01 PROCEDURE — 85018 HEMOGLOBIN: CPT | Performed by: FAMILY MEDICINE

## 2017-12-01 PROCEDURE — 36415 COLL VENOUS BLD VENIPUNCTURE: CPT | Performed by: FAMILY MEDICINE

## 2017-12-01 PROCEDURE — 99283 EMERGENCY DEPT VISIT LOW MDM: CPT | Performed by: EMERGENCY MEDICINE

## 2017-12-01 PROCEDURE — 99207 ZZC NO CHARGE NURSE ONLY: CPT

## 2017-12-01 PROCEDURE — 99281 EMR DPT VST MAYX REQ PHY/QHP: CPT

## 2017-12-01 PROCEDURE — 99282 EMERGENCY DEPT VISIT SF MDM: CPT | Mod: Z6 | Performed by: EMERGENCY MEDICINE

## 2017-12-01 PROCEDURE — 73610 X-RAY EXAM OF ANKLE: CPT | Mod: RT

## 2017-12-01 PROCEDURE — 85610 PROTHROMBIN TIME: CPT | Performed by: FAMILY MEDICINE

## 2017-12-01 NOTE — ED AVS SNAPSHOT
[facility] Emergency Department    [address] [address]    Phone:  [phone]    Fax:  [phone]                                       Bia Bill   MRN: [id number]    Department:  [facility] Emergency Department   Date of Visit:  12/1/2017           Patient Information     Date Of Birth          1965        Your diagnoses for this visit were:     Sprain of right ankle, unspecified ligament, initial encounter        You were seen by Roger Rodriguez MD and Haim Blankenship MD.      Follow-up Information     Follow up with Kd Leong MD.    Specialty:  Family Practice    Why:  As needed    Contact information:    [facility]  [address] [address] [facility] [address]  [phone]          Follow up with [facility] Emergency Department.    Specialty:  EMERGENCY MEDICINE    Why:  If symptoms worsen    Contact information:    [address] [place] [address] [id number] [phone]    Additional information:    The medical center is located at   [address] [place] [address] (between [facility] and   [facility] in [place], four miles north   of [place]).        Discharge Instructions        return if symptoms worsen or new symptoms develop.  Follow-up with primary care physician next available.  Drink plenty of fluids.  Weight-bear as tolerated.  Wear ankle splint.  Elevate leg when not using.  If unable to bear weight please return for further evaluation and care.  Understanding Ankle Sprain    The ankle is the joint where the leg and foot meet. Bones are held in place by connective tissue called ligaments. When ankle ligaments are stretched to the point of pain and injury, it is called an ankle sprain. A sprain can tear the ligaments. These tears can be very small but still cause pain. Ankle sprains can be mild or severe.  What causes an ankle sprain?  A sprain may occur when you twist your ankle or bend it too far. This can happen when you stumble or fall.  Things that can make an ankle sprain more likely include:    Having had an ankle sprain before    Playing sports that involve running and jumping. Or playing contact sports such as football or hockey.    Wearing shoes that don t support your feet and ankles well    Having ankles with poor strength and flexibility  Symptoms of an ankle sprain  Symptoms may include:    Pain or soreness in the ankle    Swelling    Redness or bruising    Not being able to walk or put weight on the affected foot    Reduced range of motion in the ankle    A popping or tearing feeling at the time the sprain occurs    An abnormal or dislocated look to the ankle    Instability or too much range of motion in the ankle  Treatment for an ankle sprain  Treatment focuses on reducing pain and swelling, and avoiding further injury. Treatments may include:    Resting the ankle. Avoid putting weight on it. This may mean using crutches until the sprain heals.    Prescription or over-the-counter pain medicines. These help reduce swelling and pain.    Cold packs. These help reduce pain and swelling.    Raising your ankle above your heart. This helps reduce swelling.    Wrapping the ankle with an elastic bandage or ankle brace. This helps reduce swelling and gives some support to the ankle. In rare cases, you may need a cast or boot.    Stretching and other exercises. These improve flexibility and strength.    Heat packs. These may be recommended before doing ankle exercises.  Possible complications of an ankle sprain  An ankle that has been weakened by a sprain can be more likely to have repeated sprains afterward. Doing exercises to strengthen your ankle and improve balance can reduce your risk for repeated sprains. Other possible complications are long-term (chronic) pain or an ankle that remains unstable.  When to call your healthcare provider  Call your healthcare provider right away if you have any of these:    Fever of 100.4 F (38 C) or higher, or  as directed    Pain, numbness, discoloration, or coldness in the foot or toes    Pain that gets worse    Symptoms that don t get better, or get worse    New symptoms   Date Last Reviewed: 3/10/2016    8525-6828 [employer]. [address] [place] [address], [place] [address]. All rights reserved. This information is not intended as a substitute for professional medical care. Always follow your healthcare professional's instructions.          Aircast   Traditional splints and casts for the foot and ankle protect the injury by preventing movement at the joints. However, many injuries heal better and faster if the injured joint can be moved, while protected at the same time. This is the reason for using an Aircast.  There are two common type of AirCasts:  1) Air-Stirrup  ankle splint  This is often used to treat ankle sprains. It contains padded air cells in a plastic frame that fits into your shoe. This allows you to walk while preventing the ankle joint from rolling in or out causing re-injury.  Ankle sprains can take 4-6 weeks to heal. Persons with severe injuries or over age 60 may require more time to heal. During that time, you are prone to re-injury by suddenly twisting your ankle again while the ligaments are still weak.  When treating a sprain, the Air-Stirrup splint should be worn whenever walking for at least four weeks, or as long as you continue to have ankle pain. You should continue to wear it at least 6 weeks whenever running, playing sports or any activity where there is increased risk of re-injury. Talk to your doctor for specific advice about the treatment of your condition.  2) SP-Walker  boot  This is a short boot that provides support and protection to the foot and ankle while allowing you to walk. It contains padded air cells that provide compression and help circulation. It is used for both foot and ankle injuries - both sprains and minor fractures. Talk to your doctor for specific advice  about the treatment of your condition.  Air-Stirrup  and SP-Walker  are trademarks of [employer].  For more information about their products, see www.aircast.com.    2777-2865 The [employer]. [address] [place] [address], [place] [address]. All rights reserved. This information is not intended as a substitute for professional medical care. Always follow your healthcare professional's instructions.          Treating Ankle Sprains  Treatment will depend on how bad your sprain is. For a severe sprain, healing may take 3 months or more.  Right after your injury: Use R.I.C.E.    Rest: At first, keep weight off the ankle as much as you can. You may be given crutches to help you walk without putting weight on the ankle.    Ice: Put an ice pack on the ankle for 15 minutes. Remove the pack and wait at least 30 minutes. Repeat for up to 3 days. This helps reduce swelling.    Compression: To reduce swelling and keep the joint stable, you may need to wrap the ankle with an elastic bandage. For more severe sprains, you may need an ankle brace or a cast.    Elevation: To reduce swelling, keep your ankle raised above your heart when you sit or lie down.  Medicine  Your healthcare provider may suggest oral non-steroidal anti-inflammatory medicine (NSAIDs), such as ibuprofen. This relieves the pain and helps reduce any swelling. Be sure to take your medicine as directed.  Contrast baths  After 3 days, soak your ankle in warm water for 30 seconds, then in cool water for 30 seconds. Go back and forth for 5 minutes. Doing this every 2 hours will help keep the swelling down.  Exercises    After about 2 to 3 weeks, you may be given exercises to strengthen the ligaments and muscles in the ankle. Doing these exercises will help prevent another ankle sprain. Exercises may include standing on your toes and then on your heels and doing ankle curls.  Ankle curls    Sit on the edge of a sturdy table or lie on your back.    Pull your toes  toward you. Then point them away from you. Repeat for 2 to 3 minutes.   Date Last Reviewed: 9/28/2015 2000-2017 The [employer]. [address] [place] [address], [place] [address]. All rights reserved. This information is not intended as a substitute for professional medical care. Always follow your healthcare professional's instructions.          Future Appointments        Provider Department Dept Phone Center    12/12/2017 10:30 AM Kd DALEY [place] [phone] [facility]    12/15/2017 1:30 PM [facility] [phone] [facility]    12/15/2017 1:35 PM [facility] [phone] [facility]    12/27/2017 10:30 AM Kd DALEY [place] [phone] [facility]    1/12/2018 1:30 PM [facility] [phone] [facility]    2/9/2018 1:30 PM [facility] [phone] [facility]    3/16/2018 1:30 PM [facility] [phone] [facility]    3/16/2018 1:35 PM [facility] [phone] [facility]    3/23/2018 1:00 PM Joshua Watts MD [facility] [phone] [facility]    4/6/2018 1:30 PM [facility] [phone] [facility]      24 Hour Appointment Hotline       To make an appointment at any [facility], call [phone] ([phone]). If you don't have a family doctor or clinic, we will help you find one. [facility] are conveniently located to serve the needs of you and your family.          ED Discharge Orders     Ankle Stabilizer Brace Regular (Gel Splint)                    Review of your medicines      Our records show that you are taking the medicines listed below. If these are incorrect, please call your family doctor or clinic.        Dose / Directions Last dose taken    acetaminophen 500 MG tablet   Commonly known as:  TYLENOL   Dose:  500-1000 mg        Take 500-1,000 mg by mouth every 6 hours as needed for mild pain    Refills:  0        * albuterol 108 (90 BASE) MCG/ACT Inhaler   Commonly known as:  PROAIR HFA/PROVENTIL HFA/VENTOLIN HFA   Dose:  2 puff   Quantity:  3 Inhaler        Inhale 2 puffs into the lungs every 6 hours as needed for shortness of breath / dyspnea or wheezing   Refills:  1        * albuterol (2.5 MG/3ML) 0.083% neb solution   Dose:  1 vial   Quantity:  1 Box        Take 1 vial (2.5 mg) by nebulization every 6 hours as needed for shortness of breath / dyspnea or wheezing   Refills:  0        allopurinol 100 MG tablet   Commonly known as:  ZYLOPRIM   Quantity:  90 tablet        TAKE ONE TABLET BY MOUTH ONE TIME DAILY   Refills:  2        ARIPiprazole 5 MG tablet   Commonly known as:  ABILIFY   Dose:  7.5 mg   Quantity:  180 tablet        Take 1.5 tablets (7.5 mg) by mouth At Bedtime   Refills:  3        buPROPion 150 MG 24 hr tablet   Commonly known as:  WELLBUTRIN XL   Quantity:  30 tablet        TAKE  ONE TABLET BY MOUTH EVERY DAY IN THE MORNING   Refills:  1        * calcium citrate-vitamin D 315-200 MG-UNIT Tabs per tablet   Commonly known as:  CITRACAL        Take two tablet at dinner and one tablet at bedtime.   Refills:  0        * CITRUS CALCIUM +D 315-250 MG-UNIT Tabs per tablet   Generic drug:  calcium citrate-vitamin D        Refills:  0        EPINEPHrine 0.3 MG/0.3ML injection 2-pack   Commonly known as:  EPIPEN/ADRENACLICK/or ANY BX GENERIC EQUIV   Dose:  0.3 mg   Quantity:  0.6 mL        Inject 0.3 mLs (0.3 mg) into the muscle once as needed for anaphylaxis   Refills:  0        furosemide 20 MG tablet   Commonly known as:  LASIX   Quantity:  180 tablet        TAKE ONE TABLET BY MOUTH TWICE DAILY   Refills:  1        gabapentin 300 MG capsule   Commonly known as:  NEURONTIN   Dose:  300 mg   Quantity:  90 capsule        Take 1 capsule (300 mg) by mouth 3 times daily   Refills:  0        lamoTRIgine 200 MG tablet   Commonly known as:  LaMICtal   Dose:  200 mg   Quantity:  90 tablet        Take 1  "tablet (200 mg) by mouth daily   Refills:  3        levothyroxine 150 MCG tablet   Commonly known as:  SYNTHROID/LEVOTHROID   Quantity:  90 tablet        TAKE ONE TABLET BY MOUTH DAILY   Refills:  2        metoclopramide 10 MG tablet   Commonly known as:  REGLAN   Dose:  10 mg   Quantity:  40 tablet        Take 1 tablet (10 mg) by mouth 4 times daily as needed   Refills:  0        mometasone-formoterol 100-5 MCG/ACT oral inhaler   Commonly known as:  DULERA   Dose:  2 puff   Quantity:  13 g        Inhale 2 puffs into the lungs 2 times daily   Refills:  11        omeprazole 20 MG CR capsule   Commonly known as:  priLOSEC   Quantity:  180 capsule        TAKE 1 CAPSULE (20 MG) BY MOUTH 2 TIMES DAILY   Refills:  1        * order for DME        Equipment being ordered: CPAP AIRSENSE 10 5-18 CM H20 SN# [id number]   DN# [id number]   Refills:  0        * order for DME   Quantity:  1 Month        Equipment being ordered: INCONTINENCE PADS  QID   Refills:  11        * order for DME   Quantity:  60 Month        Equipment being ordered: DEPENDS SIZE LARGE   Refills:  5        * order for DME   Quantity:  2 Units        Equipment being ordered: [id number]\" x2pair   \"20-30mmHg [employer] Hybrid Liners\" x 2 pair   Refills:  11        simvastatin 20 MG tablet   Commonly known as:  ZOCOR   Quantity:  90 tablet        TAKE ONE TABLET BY MOUTH AT BEDTIME   Refills:  3        traZODone 150 MG tablet   Commonly known as:  DESYREL   Dose:  150 mg   Quantity:  90 tablet        Take 1 tablet (150 mg) by mouth At Bedtime   Refills:  1        VITAMIN D (CHOLECALCIFEROL) PO   Dose:  1000 Units        Take 1,000 Units by mouth daily   Refills:  0        warfarin 5 MG tablet   Commonly known as:  COUMADIN   Quantity:  30 tablet        As directed by Anticoagulation Clinic (maintenance dose not yet established)   Refills:  0        * Notice:  This list has 8 medication(s) that are the same as other medications prescribed for you. Read the directions " "carefully, and ask your doctor or other care provider to review them with you.            Procedures and tests performed during your visit     Ankle XR, G/E 3 views, right      Orders Needing Specimen Collection     None      Pending Results     No orders found from 11/29/2017 to 12/2/2017.            Pending Culture Results     No orders found from 11/29/2017 to 12/2/2017.            Pending Results Instructions     If you had any lab results that were not finalized at the time of your Discharge, you can call the ED Lab Result RN at [phone]. You will be contacted by this team for any positive Lab results or changes in treatment. The nurses are available 7 days a week from 10A to 6:30P.  You can leave a message 24 hours per day and they will return your call.        Test Results From Your Hospital Stay        12/1/2017 10:21 AM      Narrative     XR ANKLE RT G/E 3 VW 12/1/2017 10:00 AM     HISTORY: heard \"pop\" with wt bearing ankle pain;     COMPARISON: None        Impression     IMPRESSION: No evidence of fracture. The mortise is congruent.    BERHANE WOLF MD                Thank you for choosing [facility]       Thank you for choosing [facility] for your care. Our goal is always to provide you with excellent care. Hearing back from our patients is one way we can continue to improve our services. Please take a few minutes to complete the written survey that you may receive in the mail after you visit with us. Thank you!        [employer] Information     [employer] lets you send messages to your doctor, view your test results, renew your prescriptions, schedule appointments and more. To sign up, go to [email or web address] . Click on \"Log in\" on the left side of the screen, which will take you to the Welcome page. Then click on \"Sign up Now\" on the right side of the page.     You will be asked to enter the access code listed below, as well as some personal information. Please follow the directions to create your " username and password.     Your access code is: [email or web address]  Expires: 2018 11:08 PM     Your access code will  in 90 days. If you need help or a new code, please call your [facility] clinic or [phone].        Care EveryWhere ID     This is your Care EveryWhere ID. This could be used by other organizations to access your [facility] medical records  [id number]        Equal Access to Services     [facility]: Hadii jose zamora hadasho Soomaali, waaxda luqadaha, qaybta kaalmada adeegyada, nitin roberts . So [facility] [phone].    ATENCIÓN: Si habla español, tiene a terry disposición servicios gratuitos de asistencia lingüística. Llame al [phone].    We comply with applicable federal civil rights laws and [place] laws. We do not discriminate on the basis of race, color, national origin, age, disability, sex, sexual orientation, or gender identity.            After Visit Summary       This is your record. Keep this with you and show to your community pharmacist(s) and doctor(s) at your next visit.

## 2017-12-01 NOTE — ED AVS SNAPSHOT
[facility] Emergency Department    [address] [address]    Phone:  [phone]    Fax:  [phone]                                       Bia Bill   MRN: [id number]    Department:  [facility] Emergency Department   Date of Visit:  12/1/2017           After Visit Summary Signature Page     I have received my discharge instructions, and my questions have been answered. I have discussed any challenges I see with this plan with the nurse or doctor.    ..........................................................................................................................................  Patient/Patient Representative Signature      ..........................................................................................................................................  Patient Representative Print Name and Relationship to Patient    ..................................................               ................................................  Date                                            Time    ..........................................................................................................................................  Reviewed by Signature/Title    ...................................................              ..............................................  Date                                                            Time

## 2017-12-01 NOTE — PROGRESS NOTES
ANTICOAGULATION FOLLOW-UP CLINIC VISIT    Patient Name:  Bia Bill  Date:  12/1/2017  Contact Type:  Telephone/ spoke with Norah on phone    SUBJECTIVE:     Patient Findings     Positives No Problem Findings    Comments No changes or concerns. Pt will continue 7.5 mg MF; 10 mg ROW since INR in range on that dose. Recheck at lab in 2 weeks.           OBJECTIVE    INR   Date Value Ref Range Status   12/01/2017 2.19 (H) 0.86 - 1.14 Final     Comment:     Special tube used to correct for high hematocrit       ASSESSMENT / PLAN  INR assessment THER    Recheck INR In: 2 WEEKS    INR Location Clinic lab     Anticoagulation Summary as of 12/1/2017     INR goal 2.0-2.5   Today's INR 2.19   Maintenance plan 7.5 mg (5 mg x 1.5) on Mon, Fri; 10 mg (5 mg x 2) all other days   Full instructions 7.5 mg on Mon, Fri; 10 mg all other days   Weekly total 65 mg   No change documented Mireille Torres RN   Plan last modified Angelina Nj RN (11/21/2017)   Next INR check 12/15/2017   Priority INR   Target end date     Indications   Long term current use of anticoagulant therapy [Z79.01]  DVT (deep venous thrombosis) (H) [I82.409]  PVD (peripheral vascular disease) with claudication (H) [I73.9]         Anticoagulation Episode Summary     INR check location     Preferred lab     Send INR reminders to [facility] PHONE ANTICOAG POOL    Comments * lab draw due to elevated hematocrit (fingerstick meters don't work). LEFT LE. STENT x 3 LEFT UPPER LEG. Previous arterial clot. New goal range 2-2.5 starting 11/6/17.      Anticoagulation Care Providers     Provider Role Specialty Phone number    Kd Leong MD [facility] [phone]            See the Encounter Report to view Anticoagulation Flowsheet and Dosing Calendar (Go to Encounters tab in chart review, and find the Anticoagulation Therapy Visit)        Mireille Torres, RN
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
## 2017-12-01 NOTE — DISCHARGE INSTRUCTIONS
return if symptoms worsen or new symptoms develop.  Follow-up with primary care physician next available.  Drink plenty of fluids.  Weight-bear as tolerated.  Wear ankle splint.  Elevate leg when not using.  If unable to bear weight please return for further evaluation and care.  Understanding Ankle Sprain    The ankle is the joint where the leg and foot meet. Bones are held in place by connective tissue called ligaments. When ankle ligaments are stretched to the point of pain and injury, it is called an ankle sprain. A sprain can tear the ligaments. These tears can be very small but still cause pain. Ankle sprains can be mild or severe.  What causes an ankle sprain?  A sprain may occur when you twist your ankle or bend it too far. This can happen when you stumble or fall. Things that can make an ankle sprain more likely include:    Having had an ankle sprain before    Playing sports that involve running and jumping. Or playing contact sports such as football or hockey.    Wearing shoes that don t support your feet and ankles well    Having ankles with poor strength and flexibility  Symptoms of an ankle sprain  Symptoms may include:    Pain or soreness in the ankle    Swelling    Redness or bruising    Not being able to walk or put weight on the affected foot    Reduced range of motion in the ankle    A popping or tearing feeling at the time the sprain occurs    An abnormal or dislocated look to the ankle    Instability or too much range of motion in the ankle  Treatment for an ankle sprain  Treatment focuses on reducing pain and swelling, and avoiding further injury. Treatments may include:    Resting the ankle. Avoid putting weight on it. This may mean using crutches until the sprain heals.    Prescription or over-the-counter pain medicines. These help reduce swelling and pain.    Cold packs. These help reduce pain and swelling.    Raising your ankle above your heart. This helps reduce swelling.    Wrapping the  ankle with an elastic bandage or ankle brace. This helps reduce swelling and gives some support to the ankle. In rare cases, you may need a cast or boot.    Stretching and other exercises. These improve flexibility and strength.    Heat packs. These may be recommended before doing ankle exercises.  Possible complications of an ankle sprain  An ankle that has been weakened by a sprain can be more likely to have repeated sprains afterward. Doing exercises to strengthen your ankle and improve balance can reduce your risk for repeated sprains. Other possible complications are long-term (chronic) pain or an ankle that remains unstable.  When to call your healthcare provider  Call your healthcare provider right away if you have any of these:    Fever of 100.4 F (38 C) or higher, or as directed    Pain, numbness, discoloration, or coldness in the foot or toes    Pain that gets worse    Symptoms that don t get better, or get worse    New symptoms   Date Last Reviewed: 3/10/2016    9200-3981 [employer]. [address] [place] [address]. All rights reserved. This information is not intended as a substitute for professional medical care. Always follow your healthcare professional's instructions.          Aircast   Traditional splints and casts for the foot and ankle protect the injury by preventing movement at the joints. However, many injuries heal better and faster if the injured joint can be moved, while protected at the same time. This is the reason for using an Aircast.  There are two common type of AirCasts:  1) Air-Stirrup  ankle splint  This is often used to treat ankle sprains. It contains padded air cells in a plastic frame that fits into your shoe. This allows you to walk while preventing the ankle joint from rolling in or out causing re-injury.  Ankle sprains can take 4-6 weeks to heal. Persons with severe injuries or over age 60 may require more time to heal. During that time, you are prone  to re-injury by suddenly twisting your ankle again while the ligaments are still weak.  When treating a sprain, the Air-Stirrup splint should be worn whenever walking for at least four weeks, or as long as you continue to have ankle pain. You should continue to wear it at least 6 weeks whenever running, playing sports or any activity where there is increased risk of re-injury. Talk to your doctor for specific advice about the treatment of your condition.  2) SP-Walker  boot  This is a short boot that provides support and protection to the foot and ankle while allowing you to walk. It contains padded air cells that provide compression and help circulation. It is used for both foot and ankle injuries - both sprains and minor fractures. Talk to your doctor for specific advice about the treatment of your condition.  Air-Stirrup  and SP-Walker  are trademarks of [employer].  For more information about their products, see www.aircast.com.    0818-2720 [employer]. [address] [place] [address], [place] [address]. All rights reserved. This information is not intended as a substitute for professional medical care. Always follow your healthcare professional's instructions.          Treating Ankle Sprains  Treatment will depend on how bad your sprain is. For a severe sprain, healing may take 3 months or more.  Right after your injury: Use R.I.C.E.    Rest: At first, keep weight off the ankle as much as you can. You may be given crutches to help you walk without putting weight on the ankle.    Ice: Put an ice pack on the ankle for 15 minutes. Remove the pack and wait at least 30 minutes. Repeat for up to 3 days. This helps reduce swelling.    Compression: To reduce swelling and keep the joint stable, you may need to wrap the ankle with an elastic bandage. For more severe sprains, you may need an ankle brace or a cast.    Elevation: To reduce swelling, keep your ankle raised above your heart when you sit or lie  down.  Medicine  Your healthcare provider may suggest oral non-steroidal anti-inflammatory medicine (NSAIDs), such as ibuprofen. This relieves the pain and helps reduce any swelling. Be sure to take your medicine as directed.  Contrast baths  After 3 days, soak your ankle in warm water for 30 seconds, then in cool water for 30 seconds. Go back and forth for 5 minutes. Doing this every 2 hours will help keep the swelling down.  Exercises    After about 2 to 3 weeks, you may be given exercises to strengthen the ligaments and muscles in the ankle. Doing these exercises will help prevent another ankle sprain. Exercises may include standing on your toes and then on your heels and doing ankle curls.  Ankle curls    Sit on the edge of a sturdy table or lie on your back.    Pull your toes toward you. Then point them away from you. Repeat for 2 to 3 minutes.   Date Last Reviewed: 9/28/2015 2000-2017 [employer]. [address] [place] [address], [place] [address]. All rights reserved. This information is not intended as a substitute for professional medical care. Always follow your healthcare professional's instructions.

## 2017-12-05 ASSESSMENT — ENCOUNTER SYMPTOMS
WOUND: 0
NECK PAIN: 0
MYALGIAS: 1
CHILLS: 0
WEAKNESS: 0
BRUISES/BLEEDS EASILY: 0
FEVER: 0
NUMBNESS: 0
ACTIVITY CHANGE: 1
BACK PAIN: 0
ARTHRALGIAS: 1

## 2017-12-07 ENCOUNTER — TELEPHONE (OUTPATIENT)
Dept: FAMILY MEDICINE | Facility: CLINIC | Age: 52
End: 2017-12-07

## 2017-12-07 DIAGNOSIS — J44.1 COPD EXACERBATION (H): ICD-10-CM

## 2017-12-07 RX ORDER — BUPROPION HYDROCHLORIDE 150 MG/1
TABLET ORAL
Qty: 90 TABLET | Refills: 0 | Status: SHIPPED | OUTPATIENT
Start: 2017-12-07 | End: 2018-03-07

## 2017-12-07 NOTE — TELEPHONE ENCOUNTER
Spoke with pt and she indicates that Wellbutrin was being prescribed by Lisa Bai who she no longer sees.  Pt is now a pt of [facility] who does not prescribed medication.  Pt has been taking medication as prescribed daily.  Please advise on refill as there is a gap in your refill authorizations.  Refill pended for review.    Henny CHAWLA RN

## 2017-12-09 DIAGNOSIS — E55.9 VITAMIN D DEFICIENCY: Primary | ICD-10-CM

## 2017-12-12 ENCOUNTER — OFFICE VISIT (OUTPATIENT)
Dept: PSYCHOLOGY | Facility: CLINIC | Age: 52
End: 2017-12-12
Payer: COMMERCIAL

## 2017-12-12 DIAGNOSIS — F31.62 MODERATE MIXED BIPOLAR I DISORDER (H): Primary | ICD-10-CM

## 2017-12-12 PROCEDURE — 90834 PSYTX W PT 45 MINUTES: CPT | Performed by: PSYCHOLOGIST

## 2017-12-13 RX ORDER — CHOLECALCIFEROL (VITAMIN D3) 25 MCG
TABLET ORAL
Qty: 90 TABLET | Refills: 3 | Status: SHIPPED | OUTPATIENT
Start: 2017-12-13 | End: 2018-03-14
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
## 2017-12-15 ENCOUNTER — APPOINTMENT (OUTPATIENT)
Dept: LAB | Facility: CLINIC | Age: 52
End: 2017-12-15
Payer: COMMERCIAL

## 2017-12-15 DIAGNOSIS — I82.409 DVT (DEEP VENOUS THROMBOSIS) (H): ICD-10-CM

## 2017-12-15 DIAGNOSIS — Z79.01 LONG TERM CURRENT USE OF ANTICOAGULANT THERAPY: ICD-10-CM

## 2017-12-15 DIAGNOSIS — I73.9 PERIPHERAL VASCULAR DISEASE WITH CLAUDICATION (H): ICD-10-CM

## 2017-12-15 DIAGNOSIS — D75.1 ERYTHROCYTOSIS: Chronic | ICD-10-CM

## 2017-12-15 LAB
HCT VFR BLD AUTO: 61.2 % (ref 35–47)
HGB BLD-MCNC: 19.6 G/DL (ref 11.7–15.7)
INR PPP: 1.89 (ref 0.86–1.14)

## 2017-12-15 PROCEDURE — 85018 HEMOGLOBIN: CPT | Performed by: FAMILY MEDICINE

## 2017-12-15 PROCEDURE — 36415 COLL VENOUS BLD VENIPUNCTURE: CPT | Performed by: FAMILY MEDICINE

## 2017-12-15 PROCEDURE — 85610 PROTHROMBIN TIME: CPT | Performed by: FAMILY MEDICINE

## 2017-12-15 PROCEDURE — 85014 HEMATOCRIT: CPT | Performed by: FAMILY MEDICINE

## 2017-12-18 ENCOUNTER — ANTICOAGULATION THERAPY VISIT (OUTPATIENT)
Dept: ANTICOAGULATION | Facility: CLINIC | Age: 52
End: 2017-12-18
Payer: COMMERCIAL

## 2017-12-18 DIAGNOSIS — I73.9 PVD (PERIPHERAL VASCULAR DISEASE) WITH CLAUDICATION (H): ICD-10-CM

## 2017-12-18 DIAGNOSIS — Z79.01 LONG TERM CURRENT USE OF ANTICOAGULANT THERAPY: ICD-10-CM

## 2017-12-18 DIAGNOSIS — I82.409 DVT (DEEP VENOUS THROMBOSIS) (H): ICD-10-CM

## 2017-12-18 PROCEDURE — 99207 ZZC NO CHARGE NURSE ONLY: CPT

## 2017-12-18 NOTE — MR AVS SNAPSHOT
Bia Bill   12/18/2017   Anticoagulation Therapy Visit    Description:  52 year old female   Provider:  Angelina Nj, RN   Department:  [facility]           INR as of 12/18/2017     Today's INR 1.89! (12/15/2017)      Anticoagulation Summary as of 12/18/2017     INR goal 2.0-2.5   Today's INR 1.89! (12/15/2017)   Full instructions 10 mg every day   Next INR check 12/27/2017    Indications   Long term current use of anticoagulant therapy [Z79.01]  DVT (deep venous thrombosis) (H) [I82.409]  PVD (peripheral vascular disease) with claudication (H) [I73.9]         December 2017 Details    Sun Mon Tue Wed Thu Fri Sat          1               2                 3               4               5               6               7               8               9                 10               11               12               13               14               15               16                 17               18      10 mg   See details      19      10 mg         20      10 mg         21      10 mg         22      10 mg         23      10 mg           24      10 mg         25      10 mg         26      10 mg         27            28               29               30                 31                      Date Details   12/18 This INR check       Date of next INR:  12/27/2017         How to take your warfarin dose     To take:  10 mg Take 2 of the 5 mg tablets.

## 2017-12-18 NOTE — PROGRESS NOTES
ANTICOAGULATION FOLLOW-UP CLINIC VISIT    Patient Name:  Bia Bill  Date:  12/18/2017  Contact Type:  Telephone    SUBJECTIVE:     Patient Findings     Positives Unexplained INR or factor level change    Comments Patient denies any missed doses, no other changes noted per patient report. Medications are all the same, does not really eat greens (maybe once per month).     Patient had 65mg in the previous 7 days, will increase dose to 70mg by the next INR check in 12 days from her last INR check (~8% increase). Patient has an appointment on Wed, Dec 27th, plan to check INR at that same time. Patient has to arrange a ride to [place] so she cannot come any sooner than this.              OBJECTIVE    INR   Date Value Ref Range Status   12/15/2017 1.89 (H) 0.86 - 1.14 Final       ASSESSMENT / PLAN  INR assessment SUB    Recheck INR In: 10 DAYS actual check date is 12 days from last INR   INR Location Outside lab [place] lab     Anticoagulation Summary as of 12/18/2017     INR goal 2.0-2.5   Today's INR 1.89! (12/15/2017)   Maintenance plan 10 mg (5 mg x 2) every day   Full instructions 10 mg every day   Weekly total 70 mg   Plan last modified Angelina Nj RN (12/18/2017)   Next INR check 12/27/2017   Priority INR   Target end date Indefinite    Indications   Long term current use of anticoagulant therapy [Z79.01]  DVT (deep venous thrombosis) (H) [I82.409]  PVD (peripheral vascular disease) with claudication (H) [I73.9]         Anticoagulation Episode Summary     INR check location     Preferred lab     Send INR reminders to [place] PHONE [facility]TICOAG POOL    Comments * lab draw due to elevated hematocrit (fingerstick meters don't work). LEFT LE. STENT x 3 LEFT UPPER LEG. Previous arterial clot. New goal range 2-2.5 starting 11/6/17.      Anticoagulation Care Providers     Provider Role Specialty Phone number    Kd Leong MD [facility] [phone]            See the Encounter Report to  view Anticoagulation Flowsheet and Dosing Calendar (Go to Encounters tab in chart review, and find the Anticoagulation Therapy Visit)        Angelina Nj RN

## 2017-12-27 ENCOUNTER — ANTICOAGULATION THERAPY VISIT (OUTPATIENT)
Dept: ANTICOAGULATION | Facility: CLINIC | Age: 52
End: 2017-12-27

## 2017-12-27 ENCOUNTER — OFFICE VISIT (OUTPATIENT)
Dept: PSYCHOLOGY | Facility: CLINIC | Age: 52
End: 2017-12-27
Payer: COMMERCIAL

## 2017-12-27 DIAGNOSIS — I82.409 DVT (DEEP VENOUS THROMBOSIS) (H): ICD-10-CM

## 2017-12-27 DIAGNOSIS — Z79.01 LONG TERM CURRENT USE OF ANTICOAGULANT THERAPY: ICD-10-CM

## 2017-12-27 DIAGNOSIS — D75.1 ERYTHROCYTOSIS: Chronic | ICD-10-CM

## 2017-12-27 DIAGNOSIS — F31.62 MODERATE MIXED BIPOLAR I DISORDER (H): Primary | ICD-10-CM

## 2017-12-27 DIAGNOSIS — I73.9 PVD (PERIPHERAL VASCULAR DISEASE) WITH CLAUDICATION (H): ICD-10-CM

## 2017-12-27 DIAGNOSIS — I73.9 PERIPHERAL VASCULAR DISEASE WITH CLAUDICATION (H): ICD-10-CM

## 2017-12-27 LAB
HCT VFR BLD AUTO: 60 % (ref 35–47)
HGB BLD-MCNC: 19.4 G/DL (ref 11.7–15.7)
INR PPP: 1.73 (ref 0.86–1.14)

## 2017-12-27 PROCEDURE — 85014 HEMATOCRIT: CPT | Performed by: FAMILY MEDICINE

## 2017-12-27 PROCEDURE — 85018 HEMOGLOBIN: CPT | Performed by: FAMILY MEDICINE

## 2017-12-27 PROCEDURE — 36415 COLL VENOUS BLD VENIPUNCTURE: CPT | Performed by: FAMILY MEDICINE

## 2017-12-27 PROCEDURE — 99207 ZZC NO CHARGE NURSE ONLY: CPT | Performed by: REGISTERED NURSE

## 2017-12-27 PROCEDURE — 85610 PROTHROMBIN TIME: CPT | Performed by: FAMILY MEDICINE

## 2017-12-27 PROCEDURE — 90834 PSYTX W PT 45 MINUTES: CPT | Performed by: PSYCHOLOGIST

## 2017-12-27 RX ORDER — WARFARIN SODIUM 5 MG/1
TABLET ORAL
Qty: 30 TABLET | COMMUNITY
Start: 2017-12-27 | End: 2018-02-18

## 2017-12-27 NOTE — MR AVS SNAPSHOT
Bia Bill   12/27/2017   Anticoagulation Therapy Visit    Description:  52 year old female   Provider:  Brenna Rausch, RN   Department:  [place]           INR as of 12/27/2017     Today's INR 1.73!      Anticoagulation Summary as of 12/27/2017     INR goal 2.0-2.5   Today's INR 1.73!   Full instructions 12.5 mg on Wed, Sat; 10 mg all other days   Next INR check 1/9/2018    Indications   Long term current use of anticoagulant therapy [Z79.01]  DVT (deep venous thrombosis) (H) [I82.409]  PVD (peripheral vascular disease) with claudication (H) [I73.9]         December 2017 Details    Sun Mon Tue Wed Thu Fri Sat          1               2                 3               4               5               6               7               8               9                 10               11               12               13               14               15               16                 17               18               19               20               21               22               23                 24               25               26               27      12.5 mg   See details      28      10 mg         29      10 mg         30      12.5 mg           31      10 mg                Date Details   12/27 This INR check               How to take your warfarin dose     To take:  10 mg Take 2 of the 5 mg tablets.    To take:  12.5 mg Take 2.5 of the 5 mg tablets.           January 2018 Details    Sun Mon Tue Wed Thu Fri Sat      1      10 mg         2      10 mg         3      12.5 mg         4      10 mg         5      10 mg         6      12.5 mg           7      10 mg         8      10 mg         9            10               11               12               13                 14               15               16               17               18               19               20                 21               22               23               24               25               26               27                  28               29               30               31                   Date Details   No additional details    Date of next INR:  1/9/2018         How to take your warfarin dose     To take:  10 mg Take 2 of the 5 mg tablets.    To take:  12.5 mg Take 2.5 of the 5 mg tablets.

## 2017-12-27 NOTE — PROGRESS NOTES
ANTICOAGULATION FOLLOW-UP CLINIC VISIT    Patient Name:  Bia Bill  Date:  12/27/2017  Contact Type:  Telephone/ writer spoke with Norah on phone    SUBJECTIVE:     Patient Findings     Positives Unexplained INR or factor level change    Comments Patient's dosing was recently increased but INR went down. She accurately reports taking 10 mg daily and not missing any doses. No medication or diet changes. She had one mixed salad last week but that was it. Will increase dosing another 7% and recheck at lab on 1-9 when here on another appointment.           OBJECTIVE    INR   Date Value Ref Range Status   12/27/2017 1.73 (H) 0.86 - 1.14 Final       ASSESSMENT / PLAN  INR assessment SUB    Recheck INR In: 2 WEEKS    INR Location Clinic lab     Anticoagulation Summary as of 12/27/2017     INR goal 2.0-2.5   Today's INR 1.73!   Maintenance plan 12.5 mg (5 mg x 2.5) on Wed, Sat; 10 mg (5 mg x 2) all other days   Full instructions 12.5 mg on Wed, Sat; 10 mg all other days   Weekly total 75 mg   Plan last modified Mireille Torres RN (12/27/2017)   Next INR check 1/9/2018   Priority INR   Target end date Indefinite    Indications   Long term current use of anticoagulant therapy [Z79.01]  DVT (deep venous thrombosis) (H) [I82.409]  PVD (peripheral vascular disease) with claudication (H) [I73.9]         Anticoagulation Episode Summary     INR check location     Preferred lab     Send INR reminders to [place] [facility]TICOAG POOL    Comments * lab draw due to elevated hematocrit (fingerstick meters don't work). LEFT LE. STENT x 3 LEFT UPPER LEG. Previous arterial clot. New goal range 2-2.5 starting 11/6/17.      Anticoagulation Care Providers     Provider Role Specialty Phone number    Kd Leong MD [facility] [phone]            See the Encounter Report to view Anticoagulation Flowsheet and Dosing Calendar (Go to Encounters tab in chart review, and find the Anticoagulation Therapy  Visit)        Mireille Torres RN

## 2017-12-27 NOTE — MR AVS SNAPSHOT
MRN:[id number]                      After Visit Summary   12/27/2017    Bia Bill    MRN: [id number]           Visit Information        Provider Department      12/27/2017 10:30 AM Arturo Kd CHAWLA [facility] Generic      Your next 10 appointments already scheduled     Jan 09, 2018 10:30 AM CST   Return Visit with Kd Morris   [facility] ([facility])    [address] [address]   [phone]            Jan 12, 2018  1:30 PM CST   LAB with [facility] ([facility])    [address] [address]   [phone]           Please do not eat 10-12 hours before your appointment if you are coming in fasting for labs on lipids, cholesterol, or glucose (sugar). This does not apply to pregnant women. Water, hot tea and black coffee (with nothing added) are okay. Do not drink other fluids, diet soda or chew gum.            Jan 25, 2018  9:30 AM CST   Return Visit with Kd Morris   [facility] ([facility])    [address] [address]   [phone]            Feb 09, 2018  1:30 PM CST   LAB with [facility] ([facility])    [address] [address]   [phone]           Please do not eat 10-12 hours before your appointment if you are coming in fasting for labs on lipids, cholesterol, or glucose (sugar). This does not apply to pregnant women. Water, hot tea and black coffee (with nothing added) are okay. Do not drink other fluids, diet soda or chew gum.            Mar 16, 2018  1:30 PM CDT   LAB with [facility] ([facility])    [address] [address]   [phone]           Please do not eat 10-12 hours before your appointment if you are coming in fasting for labs on lipids, cholesterol, or glucose  "(sugar). This does not apply to pregnant women. Water, hot tea and black coffee (with nothing added) are okay. Do not drink other fluids, diet soda or chew gum.            Mar 16, 2018  1:35 PM CDT   LAB with [place] [facility]   [facility] ([facility])    [address] [address]   [phone]           Please do not eat 10-12 hours before your appointment if you are coming in fasting for labs on lipids, cholesterol, or glucose (sugar). This does not apply to pregnant women. Water, hot tea and black coffee (with nothing added) are okay. Do not drink other fluids, diet soda or chew gum.            Mar 23, 2018  1:00 PM CDT   Return Visit with Joshua Watts MD   [facility] ([facility])    [facility]  [address]  [facility] [address]   [phone]            Apr 06, 2018  1:30 PM CDT   LAB with [place] [facility]   [facility] ([facility])    [address] [address]   [phone]           Please do not eat 10-12 hours before your appointment if you are coming in fasting for labs on lipids, cholesterol, or glucose (sugar). This does not apply to pregnant women. Water, hot tea and black coffee (with nothing added) are okay. Do not drink other fluids, diet soda or chew gum.              [employer] Information     [employer] lets you send messages to your doctor, view your test results, renew your prescriptions, schedule appointments and more. To sign up, go to [email or web address] . Click on \"Log in\" on the left side of the screen, which will take you to the Welcome page. Then click on \"Sign up Now\" on the right side of the page.     You will be asked to enter the access code listed below, as well as some personal information. Please follow the directions to create your username and password.     Your access code is: [email or web address]  Expires: 1/16/2018 11:08 PM     Your access code " will  in 90 days. If you need help or a new code, please call your [place] clinic or [phone].        Care EveryWhere ID     This is your Care EveryWhere ID. This could be used by other organizations to access your [place] medical records  [id number]        Equal Access to Services     ANTIONETTE IRBY : Elfego Clark, waemilia lulorenadaha, qaybmaximo kaalmafrancis clay, nitin craig. So [facility] [phone].    ATENCIÓN: Si habla español, tiene a terry disposición servicios gratuitos de asistencia lingüística. Llame al [phone].    We comply with applicable federal civil rights laws and [place] laws. We do not discriminate on the basis of race, color, national origin, age, disability, sex, sexual orientation, or gender identity.

## 2017-12-29 NOTE — PROGRESS NOTES
Progress Note  Disclaimer: This note consists of symbols derived from keyboarding, dictation and/or voice recognition software. As a result, there may be errors in the script that have gone undetected. Please consider this when interpreting information found in this chart.    Client Name: Bia Bill  Date:  12/12/2017 22         Service Type: Individual      Session Start Time:  10:30 AM  Session End Time: 11:15 AM      Session Length: 45     Session #: 21     Attendees: Client attended alone    Treatment Plan Last Reviewed: 10/24/2017  PHQ-9 / CHIN-7 : 10     DATA   Client reported she has managed to get her freeloading roommate out.  She is effectively setting boundaries with her family, particularly with her daughter.  She had a quiet holiday and is engaging in some artistic pursuits she finds soothing.       Progress Since Last Session (Related to Symptoms / Goals / Homework):   Symptoms: Worsening    Homework: Achieved / completed to satisfaction      Episode of Care Goals: Satisfactory progress - ACTION (Actively working towards change); Intervened by reinforcing change plan / affirming steps taken     Current / Ongoing Stressors and Concerns:   Recurrent depression  family relational problems, medical problems, chronic pain, trauma history Roommate problems.     Treatment Objective(s) Addressed in This Session:   Increase interest, engagement, and pleasure in doing things  Decrease frequency and intensity of feeling down, depressed, hopeless       Intervention:   CBT: Behavioral activation reviewed safety and provided in encouragement for client's self advocacy.     ASSESSMENT: Current Emotional / Mental Status (status of significant symptoms):   Risk status (Self / Other harm or suicidal ideation)   Client denies current fears or concerns for personal safety.   Client denies current or recent suicidal ideation or behaviors.   Client denies current or recent  homicidal ideation or behaviors.   Client denies current or recent self injurious behavior or ideation.   Client denies other safety concerns.   A safety and risk management plan has not been developed at this time, however client was given the after-hours number / 911 should there be a change in any of these risk factors.     Appearance:   Appropriate    Eye Contact:   Good    Psychomotor Behavior: Normal    Attitude:   Cooperative    Orientation:   All   Speech    Rate / Production: Normal     Volume:  Normal    Mood:    Normal   Affect:    Appropriate    Thought Content:  Clear    Thought Form:  Coherent  Logical    Insight:    Good      Medication Review:   No changes to current psychiatric medication(s)     Medication Compliance:   Yes     Changes in Health Issues:   None reported     Chemical Use Review:   Substance Use: Chemical use reviewed, no active concerns identified      Tobacco Use: Client reports 5 cigarettes per day      Collateral Reports Completed:   Not Applicable    PLAN: (Client Tasks / Therapist Tasks / Other)  Client to continue With her self-care routine and maintenance of boundaries.   Maintain medication compliance and contact with psychiatry  Kd Morris                                                         ________________________________________________________________________    Treatment Plan    Client's Name: Bia Bill  YOB: 1965    Date: 10/24/2017      DSM5 Diagnoses: (Sustained by DSM5 Criteria Listed Above)  Diagnoses: 296.40 Bipolar I Disorder Current or Most Recent Episode Manic, Unspecified  Psychosocial & Contextual Factors: financial difficulties, chronic pain, roommate issues  WHODAS 2.0 (12 item)               This questionnaire asks about difficulties due to health conditions. Health conditions             include disease or illnesses, other health problems that may be short or long lasting,             injuries, mental health or emotional  problems, and problems with alcohol or drugs.                         Think back over the past 30 days and answer these questions, thinking about how much             difficulty you had doing the following activities. For each question, please Pala only             one response.      S1  Standing for long periods such as 30 minutes?  Mild =           2    S2  Taking care of household responsibilities?  Moderate =   3    S3  Learning a new task, for example, learning how to get to a new place?  Mild =           2    S4  How much of a problem do you have joining community activities (for example, festivals, [employer] or other activities) in the same way as anyone else can?  Moderate =   3    S5  How much have you been emotionally affected by your health problems?  Mild =           2        In the past 30 days, how much difficulty did you have in:    S6  Concentrating on doing something for ten minutes?  Mild =           2    S7  Walking a long distance such as a kilometer (or equivalent)?  Severe =       4    S8  Washing your whole body?  None =         1    S9  Getting dressed?  None =         1    S10  Dealing with people you do not know?  Moderate =   3    S11  Maintaining a friendship?  Severe =       4    S12  Your day to day work?  Moderate =   3       H1  Overall, in the past 30 days, how many days were these difficulties present?  Record number of days seven    H2  In the past 30 days, for how many days were you totally unable to carry out your usual activities or work because of any health condition?  Record number of days  seven    H3  In the past 30 days, not counting the days that you were totally unable, for how many days did you cut back or reduce your usual activities or work because of any health condition?  Record number of days five                   Referral / Collaboration:  Referral to another professional/service is not indicated at this time..    Anticipated number of session or this episode  of care: 15    Goals  1. Education- the Biopsychosocial model of depression  a. Client will be able to describe how depression is effecting them physically, emotionally and socially  2. Behavioral Activation  a. Client will learn to assess their depression on a day to day basis  b. Client will Identify two forms of exercise/activity and engage in them 3 times per week  c. Client will Identify 3 things that make them laugh, and engage in these 5 times per week.  d. Client will Identify 1-2Creative activities or hobbies  and engage in them 2 times per week  e. Client will identify music, movies, books that make them feel good and use them 3-4 times per week  3. Self-care  a. Client will identify 5 things they can do just for themselves  b. Client will take time for quiet, reflection, meditation 5 times per week  c. Client will Learn to set boundaries when appropriate  d. Client will Identify 2 individuals they can call on for support, distraction  4. Assessment of progress  a. Client will engage in assessment of their progress on a regular basis    Bipolar Disorder  Treatment plan:  1. Education- the Biopsychosocial model of Bipolar Disorder    a. Client will be able to describe in general terms what Bipolar Disorder  is and is not  b. Client will be able to describe how Bipolar Disorder  has affected their life in at least two different areas, such as school or work and Home/relationships  c. Clients parents/guardians or significant others will be provided information on what Bipolar Disorder  is and the ways it can affect relationships and be encouraged to be a part of clients treatment team.  2. Medication  a. Client will participate in medication evaluation for Bipolar Disorder  symptoms and follow medication recommendations.   3. Identification and management of triggers  a. Client and therapist will examine patient s history to determine if there are predictable triggers for manic or depressive episodes (e.g.  boredom, anger, family stress)  b. Client and therapist will develop means of diffusing these triggers (e.g. relaxation strategies, boundary setting, anger management)  4. Comorbid conditions   a. Client and therapist will asses for comorbid conditions (e.g. anxiety, depression, substance use). and add additional items to treatment plan as needed  5. Self-care  a. Client will identify 3 things they can do just for themselves  b. Client will take time for quiet, reflection, meditation 3 times per week  c. Client will Learn to set boundaries when appropriate  d. Client will Identify 2 individuals they can call on for support, distraction  5. Behavioral Activation  a. Client will Identify two forms of exercise/activity and engage in them 3 times per week  b. Client will Identify 2 things that make them laugh, and engage in these 3 times per week.  c. Client will Identify 2 Creative activities or hobbies  and engage in them 2 times per week  d. Client will identify music, movies, books that make them feel good and use them 3 times per week  6. Assessment of progress  a. Client will engage in assessment of their progress on a regular basis    Client has reviewed and agreed to the above plan.      Kd Morris  10/24/2017 12/27/2017

## 2018-01-09 ENCOUNTER — ANTICOAGULATION THERAPY VISIT (OUTPATIENT)
Dept: ANTICOAGULATION | Facility: CLINIC | Age: 53
End: 2018-01-09

## 2018-01-09 ENCOUNTER — OFFICE VISIT (OUTPATIENT)
Dept: PSYCHOLOGY | Facility: CLINIC | Age: 53
End: 2018-01-09
Payer: COMMERCIAL

## 2018-01-09 DIAGNOSIS — Z79.01 LONG TERM CURRENT USE OF ANTICOAGULANT THERAPY: ICD-10-CM

## 2018-01-09 DIAGNOSIS — I82.409 DVT (DEEP VENOUS THROMBOSIS) (H): ICD-10-CM

## 2018-01-09 DIAGNOSIS — I73.9 PERIPHERAL VASCULAR DISEASE WITH CLAUDICATION (H): ICD-10-CM

## 2018-01-09 DIAGNOSIS — F31.62 MODERATE MIXED BIPOLAR I DISORDER (H): Primary | ICD-10-CM

## 2018-01-09 DIAGNOSIS — D75.1 ERYTHROCYTOSIS: Chronic | ICD-10-CM

## 2018-01-09 DIAGNOSIS — I73.9 PVD (PERIPHERAL VASCULAR DISEASE) WITH CLAUDICATION (H): ICD-10-CM

## 2018-01-09 LAB
HCT VFR BLD AUTO: 59.3 % (ref 35–47)
HGB BLD-MCNC: 19 G/DL (ref 11.7–15.7)
INR PPP: 2.29 (ref 0.86–1.14)

## 2018-01-09 PROCEDURE — 85014 HEMATOCRIT: CPT | Performed by: FAMILY MEDICINE

## 2018-01-09 PROCEDURE — 90834 PSYTX W PT 45 MINUTES: CPT | Performed by: PSYCHOLOGIST

## 2018-01-09 PROCEDURE — 85018 HEMOGLOBIN: CPT | Performed by: FAMILY MEDICINE

## 2018-01-09 PROCEDURE — 36415 COLL VENOUS BLD VENIPUNCTURE: CPT | Performed by: FAMILY MEDICINE

## 2018-01-09 PROCEDURE — 85610 PROTHROMBIN TIME: CPT | Performed by: FAMILY MEDICINE

## 2018-01-09 PROCEDURE — 99207 ZZC NO CHARGE NURSE ONLY: CPT

## 2018-01-09 NOTE — MR AVS SNAPSHOT
Bia Bill   1/9/2018   Anticoagulation Therapy Visit    Description:  52 year old female   Provider:  Melisa Murphy RN   Department:  [facility]           INR as of 1/9/2018     Today's INR 2.29      Anticoagulation Summary as of 1/9/2018     INR goal 2.0-2.5   Today's INR 2.29   Full instructions 12.5 mg on Wed, Sat; 10 mg all other days   Next INR check 1/25/2018    Indications   Long term current use of anticoagulant therapy [Z79.01]  DVT (deep venous thrombosis) (H) [I82.409]  PVD (peripheral vascular disease) with claudication (H) [I73.9]         January 2018 Details    Sun Mon Tue Wed Thu Fri Sat      1               2               3               4               5               6                 7               8               9      10 mg   See details      10      12.5 mg         11      10 mg         12      10 mg         13      12.5 mg           14      10 mg         15      10 mg         16      10 mg         17      12.5 mg         18      10 mg         19      10 mg         20      12.5 mg           21      10 mg         22      10 mg         23      10 mg         24      12.5 mg         25            26               27                 28               29               30               31                   Date Details   01/09 This INR check       Date of next INR:  1/25/2018         How to take your warfarin dose     To take:  10 mg Take 2 of the 5 mg tablets.    To take:  12.5 mg Take 2.5 of the 5 mg tablets.

## 2018-01-09 NOTE — MR AVS SNAPSHOT
MRN:[id number]                      After Visit Summary   1/9/2018    Bia Bill    MRN: [id number]           Visit Information        Provider Department      1/9/2018 10:30 AM Kd Morris [facility] Generic      Your next 10 appointments already scheduled     Jan 12, 2018  1:30 PM CST   LAB with [facility] ([facility])    [address] [address]   [phone]           Please do not eat 10-12 hours before your appointment if you are coming in fasting for labs on lipids, cholesterol, or glucose (sugar). This does not apply to pregnant women. Water, hot tea and black coffee (with nothing added) are okay. Do not drink other fluids, diet soda or chew gum.            Jan 25, 2018  9:30 AM CST   Return Visit with Kd Morris   [facility] ([facility]    [address] [address]   [phone]            Feb 08, 2018  9:30 AM CST   Return Visit with Kd Morris   [facility] ([facility])    [address] [address]   [phone]            Feb 09, 2018  1:30 PM CST   LAB with [facility] ([facility])    [address] [place] [address]   [phone]           Please do not eat 10-12 hours before your appointment if you are coming in fasting for labs on lipids, cholesterol, or glucose (sugar). This does not apply to pregnant women. Water, hot tea and black coffee (with nothing added) are okay. Do not drink other fluids, diet soda or chew gum.            Mar 16, 2018  1:30 PM CDT   LAB with [facility] ([facility])    [address] [place] [address]   [phone]           Please do not eat 10-12 hours before your appointment if you are coming in fasting for labs on lipids, cholesterol, or glucose  "(sugar). This does not apply to pregnant women. Water, hot tea and black coffee (with nothing added) are okay. Do not drink other fluids, diet soda or chew gum.            Mar 16, 2018  1:35 PM CDT   LAB with [facility] [facility]   [facility] ([facility])    [address] [address]   [phone]           Please do not eat 10-12 hours before your appointment if you are coming in fasting for labs on lipids, cholesterol, or glucose (sugar). This does not apply to pregnant women. Water, hot tea and black coffee (with nothing added) are okay. Do not drink other fluids, diet soda or chew gum.            Mar 23, 2018  1:00 PM CDT   Return Visit with Joshua Watts MD   [facility] ([facility])    [facility]  [address]  [place] [facility] [place] [address]   [phone]            Apr 06, 2018  1:30 PM CDT   LAB with [place] [facility]   [facility] ([facility])    [address] [address]   [phone]           Please do not eat 10-12 hours before your appointment if you are coming in fasting for labs on lipids, cholesterol, or glucose (sugar). This does not apply to pregnant women. Water, hot tea and black coffee (with nothing added) are okay. Do not drink other fluids, diet soda or chew gum.              Care Instructions    [id number]         Kash Information     Kash lets you send messages to your doctor, view your test results, renew your prescriptions, schedule appointments and more. To sign up, go to [email or web address] . Click on \"Log in\" on the left side of the screen, which will take you to the Welcome page. Then click on \"Sign up Now\" on the right side of the page.     You will be asked to enter the access code listed below, as well as some personal information. Please follow the directions to create your username and password.     Your access code is: [email or web address]  Expires: " 2018 11:08 PM     Your access code will  in 90 days. If you need help or a new code, please call your [place] clinic or [phone].        Care EveryWhere ID     This is your Care EveryWhere ID. This could be used by other organizations to access your [place] medical records  [id number]        Equal Access to Services     ANTIONETTE IRBY : Elfego Clark, waldemar rust, emma clay, nitin roberts . So [facility] [phone].    ATENCIÓN: Si habla español, tiene a terry disposición servicios gratuitos de asistencia lingüística. Llame al [phone].    We comply with applicable federal civil rights laws and [place] laws. We do not discriminate on the basis of race, color, national origin, age, disability, sex, sexual orientation, or gender identity.

## 2018-01-09 NOTE — PROGRESS NOTES
ANTICOAGULATION FOLLOW-UP CLINIC VISIT    Patient Name:  Bia Bill  Date:  1/9/2018  Contact Type:  Telephone/ spoke with pt    SUBJECTIVE:     Patient Findings     Positives No Problem Findings    Comments Pt denies changes in medications, diet, activity or health, reports taking warfarin as directed.             OBJECTIVE    INR   Date Value Ref Range Status   01/09/2018 2.29 (H) 0.86 - 1.14 Final     Comment:     Special tube used to correct for high hematocrit       ASSESSMENT / PLAN  No question data found.  Anticoagulation Summary as of 1/9/2018     INR goal 2.0-2.5   Today's INR 2.29   Maintenance plan 12.5 mg (5 mg x 2.5) on Wed, Sat; 10 mg (5 mg x 2) all other days   Full instructions 12.5 mg on Wed, Sat; 10 mg all other days   Weekly total 75 mg   Plan last modified Mireille Torres RN (12/27/2017)   Next INR check 1/25/2018   Priority INR   Target end date Indefinite    Indications   Long term current use of anticoagulant therapy [Z79.01]  DVT (deep venous thrombosis) (H) [I82.409]  PVD (peripheral vascular disease) with claudication (H) [I73.9]         Anticoagulation Episode Summary     INR check location     Preferred lab     Send INR reminders to [facility] PHONE [facility]TICOAG POOL    Comments * lab draw due to elevated hematocrit (fingerstick meters don't work). LEFT LE. STENT x 3 LEFT UPPER LEG. Previous arterial clot. New goal range 2-2.5 starting 11/6/17.      Anticoagulation Care Providers     Provider Role Specialty Phone number    Kd Leong MD [facility] [phone]            See the Encounter Report to view Anticoagulation Flowsheet and Dosing Calendar (Go to Encounters tab in chart review, and find the Anticoagulation Therapy Visit)        Melisa Murphy RN

## 2018-01-11 NOTE — PROGRESS NOTES
Progress Note  Disclaimer: This note consists of symbols derived from keyboarding, dictation and/or voice recognition software. As a result, there may be errors in the script that have gone undetected. Please consider this when interpreting information found in this chart.    Client Name: Bia Bill  Date: 1/9/2018         Service Type: Individual      Session Start Time:  10:30 AM  Session End Time: 11:15 AM      Session Length: 45     Session #: 22     Attendees: Client attended alone    Treatment Plan Last Reviewed: 10/24/2017  PHQ-9 / CHIN-7 : 10     DATA   Client reported she is having some problems with her daughter.  Her daughter wants her to move in with her.  Daughter stating that she is worried about the client, feeling that she is incompetent to take care of herself.  Client feels that the daughter is really after it is financial assistance and free babysitting.  Supported client's effort at a time to meet with the caveat that she manage her medical issues and risk of falling.     Progress Since Last Session (Related to Symptoms / Goals / Homework):   Symptoms: Stable    Homework: Achieved / completed to satisfaction      Episode of Care Goals: Satisfactory progress - ACTION (Actively working towards change); Intervened by reinforcing change plan / affirming steps taken     Current / Ongoing Stressors and Concerns:   Recurrent depression  family relational problems, medical problems, chronic pain, trauma history      Treatment Objective(s) Addressed in This Session:   Increase interest, engagement, and pleasure in doing things  Decrease frequency and intensity of feeling down, depressed, hopeless       Intervention:   CBT: Behavioral activation reviewed safety and provided in encouragement for client's self advocacy.      ASSESSMENT: Current Emotional / Mental Status (status of significant symptoms):   Risk status (Self / Other harm or suicidal  ideation)   Client denies current fears or concerns for personal safety.   Client denies current or recent suicidal ideation or behaviors.   Client denies current or recent homicidal ideation or behaviors.   Client denies current or recent self injurious behavior or ideation.   Client denies other safety concerns.   A safety and risk management plan has not been developed at this time, however client was given the after-hours number / 911 should there be a change in any of these risk factors.     Appearance:   Appropriate    Eye Contact:   Good    Psychomotor Behavior: Normal    Attitude:   Cooperative    Orientation:   All   Speech    Rate / Production: Normal     Volume:  Normal    Mood:    Normal   Affect:    Appropriate    Thought Content:  Clear    Thought Form:  Coherent  Logical    Insight:    Good      Medication Review:   No changes to current psychiatric medication(s)     Medication Compliance:   Yes     Changes in Health Issues:   None reported     Chemical Use Review:   Substance Use: Chemical use reviewed, no active concerns identified      Tobacco Use: Client reports 5 cigarettes per day      Collateral Reports Completed:   Not Applicable    PLAN: (Client Tasks / Therapist Tasks / Other)  Client to continue With her self-care routine and maintenance of boundaries.   Maintain medication compliance and contact with psychiatry.  Client to consult her medical providers regarding falls risk and independent living.  Kd Morris                                                         ________________________________________________________________________    Treatment Plan    Client's Name: Bia Bill  YOB: 1965    Date: 10/24/2017      DSM5 Diagnoses: (Sustained by DSM5 Criteria Listed Above)  Diagnoses: 296.40 Bipolar I Disorder Current or Most Recent Episode Manic, Unspecified  Psychosocial & Contextual Factors: financial difficulties, chronic pain, roommate issues  WHODAS 2.0 (12  item)               This questionnaire asks about difficulties due to health conditions. Health conditions             include disease or illnesses, other health problems that may be short or long lasting,             injuries, mental health or emotional problems, and problems with alcohol or drugs.                         Think back over the past 30 days and answer these questions, thinking about how much             difficulty you had doing the following activities. For each question, please [place] only             one response.      S1  Standing for long periods such as 30 minutes?  Mild =           2    S2  Taking care of household responsibilities?  Moderate =   3    S3  Learning a new task, for example, learning how to get to a new place?  Mild =           2    S4  How much of a problem do you have joining community activities (for example, festivals, Sabian[employer] or other activities) in the same way as anyone else can?  Moderate =   3    S5  How much have you been emotionally affected by your health problems?  Mild =           2        In the past 30 days, how much difficulty did you have in:    S6  Concentrating on doing something for ten minutes?  Mild =           2    S7  Walking a long distance such as a kilometer (or equivalent)?  Severe =       4    S8  Washing your whole body?  None =         1    S9  Getting dressed?  None =         1    S10  Dealing with people you do not know?  Moderate =   3    S11  Maintaining a friendship?  Severe =       4    S12  Your day to day work?  Moderate =   3       H1  Overall, in the past 30 days, how many days were these difficulties present?  Record number of days seven    H2  In the past 30 days, for how many days were you totally unable to carry out your usual activities or work because of any health condition?  Record number of days  seven    H3  In the past 30 days, not counting the days that you were totally unable, for how many days did you cut back or reduce your  usual activities or work because of any health condition?  Record number of days five                   Referral / Collaboration:  Referral to another professional/service is not indicated at this time..    Anticipated number of session or this episode of care: 15    Goals  1. Education- the Biopsychosocial model of depression  a. Client will be able to describe how depression is effecting them physically, emotionally and socially  2. Behavioral Activation  a. Client will learn to assess their depression on a day to day basis  b. Client will Identify two forms of exercise/activity and engage in them 3 times per week  c. Client will Identify 3 things that make them laugh, and engage in these 5 times per week.  d. Client will Identify 1-2Creative activities or hobbies  and engage in them 2 times per week  e. Client will identify music, movies, books that make them feel good and use them 3-4 times per week  3. Self-care  a. Client will identify 5 things they can do just for themselves  b. Client will take time for quiet, reflection, meditation 5 times per week  c. Client will Learn to set boundaries when appropriate  d. Client will Identify 2 individuals they can call on for support, distraction  4. Assessment of progress  a. Client will engage in assessment of their progress on a regular basis    Bipolar Disorder  Treatment plan:  1. Education- the Biopsychosocial model of Bipolar Disorder    a. Client will be able to describe in general terms what Bipolar Disorder  is and is not  b. Client will be able to describe how Bipolar Disorder  has affected their life in at least two different areas, such as school or work and Home/relationships  c. Clients parents/guardians or significant others will be provided information on what Bipolar Disorder  is and the ways it can affect relationships and be encouraged to be a part of clients treatment team.  2. Medication  a. Client will participate in medication evaluation for  Bipolar Disorder  symptoms and follow medication recommendations.   3. Identification and management of triggers  a. Client and therapist will examine patient s history to determine if there are predictable triggers for manic or depressive episodes (e.g. boredom, anger, family stress)  b. Client and therapist will develop means of diffusing these triggers (e.g. relaxation strategies, boundary setting, anger management)  4. Comorbid conditions   a. Client and therapist will asses for comorbid conditions (e.g. anxiety, depression, substance use). and add additional items to treatment plan as needed  5. Self-care  a. Client will identify 3 things they can do just for themselves  b. Client will take time for quiet, reflection, meditation 3 times per week  c. Client will Learn to set boundaries when appropriate  d. Client will Identify 2 individuals they can call on for support, distraction  5. Behavioral Activation  a. Client will Identify two forms of exercise/activity and engage in them 3 times per week  b. Client will Identify 2 things that make them laugh, and engage in these 3 times per week.  c. Client will Identify 2 Creative activities or hobbies  and engage in them 2 times per week  d. Client will identify music, movies, books that make them feel good and use them 3 times per week  6. Assessment of progress  a. Client will engage in assessment of their progress on a regular basis    Client has reviewed and agreed to the above plan.      Kd Morris  10/24/2017 12/27/2017

## 2018-01-17 DIAGNOSIS — F33.1 MAJOR DEPRESSIVE DISORDER, RECURRENT EPISODE, MODERATE (H): ICD-10-CM

## 2018-01-17 NOTE — TELEPHONE ENCOUNTER
"TRAZODONE 150 MG TAB APOT        Last Written Prescription Date:  7/21/17  Last Fill Quantity: 90,   # refills: 1  Last Office Visit: 11/13/17  Future Office visit:    Next 5 appointments (look out 90 days)     Jan 25, 2018  9:30 AM CST   Return Visit with Kd Morris   [facility] ([facility])    [address] [address]   [phone]            Feb 08, 2018  9:30 AM CST   Return Visit with Kd Morris   [facility] ([facility])    [address] [address]   [phone]            Mar 23, 2018  1:00 PM CDT   Return Visit with Joshua Watts MD   [facility] ([facility])    [facility]  [address]  [facility] [address]   [phone]                   Requested Prescriptions   Pending Prescriptions Disp Refills     traZODone (DESYREL) 150 MG tablet [Pharmacy Med Name: TRAZODONE 150 MG    TAB APOT] 30 tablet 0     Sig: TAKE ONE TABLET BY MOUTH AT BEDTIME    Serotonin Modulators Passed    1/17/2018 10:59 AM       Passed - Recent or future visit with authorizing provider's specialty    Patient had office visit in the last year or has a visit in the next 30 days with authorizing provider.  See \"Patient Info\" tab in inbasket, or \"Choose Columns\" in Meds & Orders section of the refill encounter.              Passed - Patient is age 18 or older       Passed - No active pregnancy on record       Passed - No positive pregnancy test in past 12 months          "

## 2018-01-22 RX ORDER — TRAZODONE HYDROCHLORIDE 150 MG/1
TABLET ORAL
Qty: 30 TABLET | Refills: 0 | Status: SHIPPED | OUTPATIENT
Start: 2018-01-22 | End: 2018-03-01

## 2018-01-25 ENCOUNTER — ANTICOAGULATION THERAPY VISIT (OUTPATIENT)
Dept: ANTICOAGULATION | Facility: CLINIC | Age: 53
End: 2018-01-25

## 2018-01-25 ENCOUNTER — OFFICE VISIT (OUTPATIENT)
Dept: PSYCHOLOGY | Facility: CLINIC | Age: 53
End: 2018-01-25
Payer: COMMERCIAL

## 2018-01-25 DIAGNOSIS — D75.1 ERYTHROCYTOSIS: Chronic | ICD-10-CM

## 2018-01-25 DIAGNOSIS — I82.409 DVT (DEEP VENOUS THROMBOSIS) (H): ICD-10-CM

## 2018-01-25 DIAGNOSIS — I73.9 PERIPHERAL VASCULAR DISEASE WITH CLAUDICATION (H): ICD-10-CM

## 2018-01-25 DIAGNOSIS — I73.9 PVD (PERIPHERAL VASCULAR DISEASE) WITH CLAUDICATION (H): ICD-10-CM

## 2018-01-25 DIAGNOSIS — F31.62 MODERATE MIXED BIPOLAR I DISORDER (H): Primary | ICD-10-CM

## 2018-01-25 DIAGNOSIS — Z79.01 LONG TERM CURRENT USE OF ANTICOAGULANT THERAPY: ICD-10-CM

## 2018-01-25 LAB
HCT VFR BLD AUTO: 64 % (ref 35–47)
HGB BLD-MCNC: 20 G/DL (ref 11.7–15.7)
INR PPP: 2.03 (ref 0.86–1.14)

## 2018-01-25 PROCEDURE — 85610 PROTHROMBIN TIME: CPT | Performed by: FAMILY MEDICINE

## 2018-01-25 PROCEDURE — 85018 HEMOGLOBIN: CPT | Performed by: FAMILY MEDICINE

## 2018-01-25 PROCEDURE — 90834 PSYTX W PT 45 MINUTES: CPT | Performed by: PSYCHOLOGIST

## 2018-01-25 PROCEDURE — 99207 ZZC NO CHARGE NURSE ONLY: CPT

## 2018-01-25 PROCEDURE — 36415 COLL VENOUS BLD VENIPUNCTURE: CPT | Performed by: FAMILY MEDICINE

## 2018-01-25 PROCEDURE — 85014 HEMATOCRIT: CPT | Performed by: FAMILY MEDICINE

## 2018-01-25 NOTE — MR AVS SNAPSHOT
Bia Bill   1/25/2018   Anticoagulation Therapy Visit    Description:  52 year old female   Provider:  Melisa Murphy, RN   Department:  [facility]           INR as of 1/25/2018     Today's INR 2.03      Anticoagulation Summary as of 1/25/2018     INR goal 2.0-2.5   Today's INR 2.03   Full instructions 12.5 mg on Wed, Sat; 10 mg all other days   Next INR check 2/8/2018    Indications   Long term current use of anticoagulant therapy [Z79.01]  DVT (deep venous thrombosis) (H) [I82.409]  PVD (peripheral vascular disease) with claudication (H) [I73.9]         January 2018 Details    Sun Mon Tue Wed Thu Fri Sat      1               2               3               4               5               6                 7               8               9               10               11               12               13                 14               15               16               17               18               19               20                 21               22               23               24               25      10 mg   See details      26      10 mg         27      12.5 mg           28      10 mg         29      10 mg         30      10 mg         31      12.5 mg             Date Details   01/25 This INR check               How to take your warfarin dose     To take:  10 mg Take 2 of the 5 mg tablets.    To take:  12.5 mg Take 2.5 of the 5 mg tablets.           February 2018 Details    Sun Mon Tue Wed Thu Fri Sat         1      10 mg         2      10 mg         3      12.5 mg           4      10 mg         5      10 mg         6      10 mg         7      12.5 mg         8            9               10                 11               12               13               14               15               16               17                 18               19               20               21               22               23               24                 25               26               27                28                   Date Details   No additional details    Date of next INR:  2/8/2018         How to take your warfarin dose     To take:  10 mg Take 2 of the 5 mg tablets.    To take:  12.5 mg Take 2.5 of the 5 mg tablets.

## 2018-01-25 NOTE — PROGRESS NOTES
ANTICOAGULATION FOLLOW-UP CLINIC VISIT    Patient Name:  Bia Bill  Date:  1/25/2018  Contact Type:  Telephone/ spoke with Norah    SUBJECTIVE:     Patient Findings     Positives No Problem Findings    Comments Pt denies changes in medications, diet, activity or health, reports taking warfarin as directed.             OBJECTIVE    INR   Date Value Ref Range Status   01/25/2018 2.03 (H) 0.86 - 1.14 Corrected     Comment:     Special tube used to correct for high hematocrit  CORRECTED ON 01/25 AT 1222: PREVIOUSLY REPORTED AS Special tube used to   correct for high hematocrit CORRECTED ON 01/25 AT 1120: PREVIOUSLY REPORTED AS   2.03         ASSESSMENT / PLAN  No question data found.  Anticoagulation Summary as of 1/25/2018     INR goal 2.0-2.5   Today's INR 2.03   Maintenance plan 12.5 mg (5 mg x 2.5) on Wed, Sat; 10 mg (5 mg x 2) all other days   Full instructions 12.5 mg on Wed, Sat; 10 mg all other days   Weekly total 75 mg   Plan last modified Mireille Torres RN (12/27/2017)   Next INR check 2/8/2018   Priority INR   Target end date Indefinite    Indications   Long term current use of anticoagulant therapy [Z79.01]  DVT (deep venous thrombosis) (H) [I82.409]  PVD (peripheral vascular disease) with claudication (H) [I73.9]         Anticoagulation Episode Summary     INR check location     Preferred lab     Send INR reminders to [place] PHONE ANTICOAG POOL    Comments * lab draw due to elevated hematocrit (fingerstick meters don't work). LEFT LE. STENT x 3 LEFT UPPER LEG. Previous arterial clot. New goal range 2-2.5 starting 11/6/17.      Anticoagulation Care Providers     Provider Role Specialty Phone number    Kd Leong MD [facility] [phone]            See the Encounter Report to view Anticoagulation Flowsheet and Dosing Calendar (Go to Encounters tab in chart review, and find the Anticoagulation Therapy Visit)        Melisa Murphy RN

## 2018-01-25 NOTE — MR AVS SNAPSHOT
MRN:[id number]                      After Visit Summary   1/25/2018    Bia Bill    MRN: [id number]           Visit Information        Provider Department      1/25/2018 9:30 AM Arturo Kd CHAWLA [facility] Generic      Your next 10 appointments already scheduled     Feb 08, 2018  9:30 AM CST   Return Visit with Kd Morris   [facility] ([facility])    [address] [address]   [phone]            Feb 09, 2018  1:30 PM CST   LAB with [facility] ([facility])    [address] [address]   [phone]           Please do not eat 10-12 hours before your appointment if you are coming in fasting for labs on lipids, cholesterol, or glucose (sugar). This does not apply to pregnant women. Water, hot tea and black coffee (with nothing added) are okay. Do not drink other fluids, diet soda or chew gum.            Feb 22, 2018 10:30 AM CST   Return Visit with Kd Morris   [facility] ([facility])    [address] [address]   [phone]            Mar 16, 2018  1:30 PM CDT   LAB with [facility] ([facility])    [address] [address]   [phone]           Please do not eat 10-12 hours before your appointment if you are coming in fasting for labs on lipids, cholesterol, or glucose (sugar). This does not apply to pregnant women. Water, hot tea and black coffee (with nothing added) are okay. Do not drink other fluids, diet soda or chew gum.            Mar 16, 2018  1:35 PM CDT   LAB with [facility] ([facility])    [address] [address]   [phone]           Please do not eat 10-12 hours before your appointment if you are coming in fasting for labs on lipids, cholesterol, or glucose  "(sugar). This does not apply to pregnant women. Water, hot tea and black coffee (with nothing added) are okay. Do not drink other fluids, diet soda or chew gum.            Mar 23, 2018  1:00 PM CDT   Return Visit with Joshua Watts MD   [facility] ([facility])    [facility]  [address] [place] [address]  [facility] [address]   [phone]            2018  1:30 PM CDT   LAB with [place] [facility]   [facility]    [address]  [facility] [address]   [phone]           Please do not eat 10-12 hours before your appointment if you are coming in fasting for labs on lipids, cholesterol, or glucose (sugar). This does not apply to pregnant women. Water, hot tea and black coffee (with nothing added) are okay. Do not drink other fluids, diet soda or chew gum.              TakeCharge Information     Take[employer]ge lets you send messages to your doctor, view your test results, renew your prescriptions, schedule appointments and more. To sign up, go to [email or web address] . Click on \"Log in\" on the left side of the screen, which will take you to the Welcome page. Then click on \"Sign up Now\" on the right side of the page.     You will be asked to enter the access code listed below, as well as some personal information. Please follow the directions to create your username and password.     Your access code is: [email or web address]  Expires: 2018 12:16 PM     Your access code will  in 90 days. If you need help or a new code, please call your [place] clinic or [phone].        Care EveryWhere ID     This is your Care EveryWhere ID. This could be used by other organizations to access your [place] medical records  [id number]        Equal Access to Services     ANTIONETTE IRBY : Elfego Clark, waldemar rust, nitin nelson. [facility] [phone].    ATENCIÓN: Si " habla chino, tiene a terry disposición servicios gratuitos de asistencia lingüística. Llame al [phone].    We comply with applicable federal civil rights laws and [place] laws. We do not discriminate on the basis of race, color, national origin, age, disability, sex, sexual orientation, or gender identity.

## 2018-01-26 NOTE — PROGRESS NOTES
Progress Note  Disclaimer: This note consists of symbols derived from keyboarding, dictation and/or voice recognition software. As a result, there may be errors in the script that have gone undetected. Please consider this when interpreting information found in this chart.    Client Name: Bia Bill  Date: 1/25/2019         Service Type: Individual      Session Start Time: 9:30 AM  Session End Time: 10:15 AM      Session Length: 45     Session #: 23     Attendees: Client attended alone    Treatment Plan Last Reviewed: 10/24/2017  PHQ-9 / CHIN-7 : 10     DATA   Client reported the anniversary of her father's death was a few days ago and she had a bad day thinking about him much of the time.  The 2 were very close.  Client reports she is going to visit her daughter over the weekend and is looking forward to the time away.  She has been exercising somewhat more, walking around the block.     Progress Since Last Session (Related to Symptoms / Goals / Homework):   Symptoms: Stable    Homework: Achieved / completed to satisfaction      Episode of Care Goals: Satisfactory progress - ACTION (Actively working towards change); Intervened by reinforcing change plan / affirming steps taken     Current / Ongoing Stressors and Concerns:   Recurrent depression  family relational problems, medical problems, chronic pain, trauma history      Treatment Objective(s) Addressed in This Session:   Increase interest, engagement, and pleasure in doing things  Decrease frequency and intensity of feeling down, depressed, hopeless       Intervention:   CBT: Behavioral activation spent time processing feelings around losing her father.     ASSESSMENT: Current Emotional / Mental Status (status of significant symptoms):   Risk status (Self / Other harm or suicidal ideation)   Client denies current fears or concerns for personal safety.   Client denies current or recent suicidal ideation or  behaviors.   Client denies current or recent homicidal ideation or behaviors.   Client denies current or recent self injurious behavior or ideation.   Client denies other safety concerns.   A safety and risk management plan has not been developed at this time, however client was given the after-hours number / 911 should there be a change in any of these risk factors.     Appearance:   Appropriate    Eye Contact:   Good    Psychomotor Behavior: Normal    Attitude:   Cooperative    Orientation:   All   Speech    Rate / Production: Normal     Volume:  Normal    Mood:    Normal   Affect:    Appropriate    Thought Content:  Clear    Thought Form:  Coherent  Logical    Insight:    Good      Medication Review:   No changes to current psychiatric medication(s)     Medication Compliance:   Yes     Changes in Health Issues:   None reported     Chemical Use Review:   Substance Use: Chemical use reviewed, no active concerns identified      Tobacco Use: Client reports 5 cigarettes per day      Collateral Reports Completed:   Not Applicable    PLAN: (Client Tasks / Therapist Tasks / Other)  Client to continue With her self-care routine and maintenance of boundaries.   Maintain medication compliance and contact with psychiatry.  Client to consult her medical providers regarding falls risk and independent living.  Kd Morris                                                         ________________________________________________________________________    Treatment Plan    Client's Name: Bia Bill  YOB: 1965    Date: 10/24/2017      DSM5 Diagnoses: (Sustained by DSM5 Criteria Listed Above)  Diagnoses: 296.40 Bipolar I Disorder Current or Most Recent Episode Manic, Unspecified  Psychosocial & Contextual Factors: financial difficulties, chronic pain, roommate issues  WHODAS 2.0 (12 item)               This questionnaire asks about difficulties due to health conditions. Health conditions             include  disease or illnesses, other health problems that may be short or long lasting,             injuries, mental health or emotional problems, and problems with alcohol or drugs.                         Think back over the past 30 days and answer these questions, thinking about how much             difficulty you had doing the following activities. For each question, please [place] only             one response.      S1  Standing for long periods such as 30 minutes?  Mild =           2    S2  Taking care of household responsibilities?  Moderate =   3    S3  Learning a new task, for example, learning how to get to a new place?  Mild =           2    S4  How much of a problem do you have joining community activities (for example, festivals, Hindu or other activities) in the same way as anyone else can?  Moderate =   3    S5  How much have you been emotionally affected by your health problems?  Mild =           2        In the past 30 days, how much difficulty did you have in:    S6  Concentrating on doing something for ten minutes?  Mild =           2    S7  Walking a long distance such as a kilometer (or equivalent)?  Severe =       4    S8  Washing your whole body?  None =         1    S9  Getting dressed?  None =         1    S10  Dealing with people you do not know?  Moderate =   3    S11  Maintaining a friendship?  Severe =       4    S12  Your day to day work?  Moderate =   3       H1  Overall, in the past 30 days, how many days were these difficulties present?  Record number of days seven    H2  In the past 30 days, for how many days were you totally unable to carry out your usual activities or work because of any health condition?  Record number of days  seven    H3  In the past 30 days, not counting the days that you were totally unable, for how many days did you cut back or reduce your usual activities or work because of any health condition?  Record number of days five                   Referral /  Collaboration:  Referral to another professional/service is not indicated at this time..    Anticipated number of session or this episode of care: 15    Goals  1. Education- the Biopsychosocial model of depression  a. Client will be able to describe how depression is effecting them physically, emotionally and socially  2. Behavioral Activation  a. Client will learn to assess their depression on a day to day basis  b. Client will Identify two forms of exercise/activity and engage in them 3 times per week  c. Client will Identify 3 things that make them laugh, and engage in these 5 times per week.  d. Client will Identify 1-2Creative activities or hobbies  and engage in them 2 times per week  e. Client will identify music, movies, books that make them feel good and use them 3-4 times per week  3. Self-care  a. Client will identify 5 things they can do just for themselves  b. Client will take time for quiet, reflection, meditation 5 times per week  c. Client will Learn to set boundaries when appropriate  d. Client will Identify 2 individuals they can call on for support, distraction  4. Assessment of progress  a. Client will engage in assessment of their progress on a regular basis    Bipolar Disorder  Treatment plan:  1. Education- the Biopsychosocial model of Bipolar Disorder    a. Client will be able to describe in general terms what Bipolar Disorder  is and is not  b. Client will be able to describe how Bipolar Disorder  has affected their life in at least two different areas, such as school or work and Home/relationships  c. Clients parents/guardians or significant others will be provided information on what Bipolar Disorder  is and the ways it can affect relationships and be encouraged to be a part of clients treatment team.  2. Medication  a. Client will participate in medication evaluation for Bipolar Disorder  symptoms and follow medication recommendations.   3. Identification and management of  triggers  a. Client and therapist will examine patient s history to determine if there are predictable triggers for manic or depressive episodes (e.g. boredom, anger, family stress)  b. Client and therapist will develop means of diffusing these triggers (e.g. relaxation strategies, boundary setting, anger management)  4. Comorbid conditions   a. Client and therapist will asses for comorbid conditions (e.g. anxiety, depression, substance use). and add additional items to treatment plan as needed  5. Self-care  a. Client will identify 3 things they can do just for themselves  b. Client will take time for quiet, reflection, meditation 3 times per week  c. Client will Learn to set boundaries when appropriate  d. Client will Identify 2 individuals they can call on for support, distraction  5. Behavioral Activation  a. Client will Identify two forms of exercise/activity and engage in them 3 times per week  b. Client will Identify 2 things that make them laugh, and engage in these 3 times per week.  c. Client will Identify 2 Creative activities or hobbies  and engage in them 2 times per week  d. Client will identify music, movies, books that make them feel good and use them 3 times per week  6. Assessment of progress  a. Client will engage in assessment of their progress on a regular basis    Client has reviewed and agreed to the above plan.      Kd Morris  10/24/2017 12/27/2017

## 2018-02-08 ENCOUNTER — OFFICE VISIT (OUTPATIENT)
Dept: PSYCHOLOGY | Facility: CLINIC | Age: 53
End: 2018-02-08
Payer: COMMERCIAL

## 2018-02-08 ENCOUNTER — ANTICOAGULATION THERAPY VISIT (OUTPATIENT)
Dept: ANTICOAGULATION | Facility: CLINIC | Age: 53
End: 2018-02-08

## 2018-02-08 DIAGNOSIS — I73.9 PVD (PERIPHERAL VASCULAR DISEASE) (H): ICD-10-CM

## 2018-02-08 DIAGNOSIS — I73.9 PVD (PERIPHERAL VASCULAR DISEASE) WITH CLAUDICATION (H): ICD-10-CM

## 2018-02-08 DIAGNOSIS — D75.1 ERYTHROCYTOSIS: Chronic | ICD-10-CM

## 2018-02-08 DIAGNOSIS — Z79.01 LONG TERM CURRENT USE OF ANTICOAGULANT THERAPY: ICD-10-CM

## 2018-02-08 DIAGNOSIS — Z86.718 HISTORY OF DEEP VENOUS THROMBOSIS: ICD-10-CM

## 2018-02-08 DIAGNOSIS — F31.62 MODERATE MIXED BIPOLAR I DISORDER (H): Primary | ICD-10-CM

## 2018-02-08 LAB
HCT VFR BLD AUTO: 62.4 % (ref 35–47)
HGB BLD-MCNC: 20.3 G/DL (ref 11.7–15.7)
INR PPP: 2.16 (ref 0.86–1.14)

## 2018-02-08 PROCEDURE — 85610 PROTHROMBIN TIME: CPT | Performed by: FAMILY MEDICINE

## 2018-02-08 PROCEDURE — 85018 HEMOGLOBIN: CPT | Performed by: FAMILY MEDICINE

## 2018-02-08 PROCEDURE — 99207 ZZC NO CHARGE NURSE ONLY: CPT | Performed by: REGISTERED NURSE

## 2018-02-08 PROCEDURE — 90834 PSYTX W PT 45 MINUTES: CPT | Performed by: PSYCHOLOGIST

## 2018-02-08 PROCEDURE — 36415 COLL VENOUS BLD VENIPUNCTURE: CPT | Performed by: FAMILY MEDICINE

## 2018-02-08 PROCEDURE — 85014 HEMATOCRIT: CPT | Performed by: FAMILY MEDICINE

## 2018-02-08 NOTE — MR AVS SNAPSHOT
MRN:[id number]                      After Visit Summary   2/8/2018    Bia Bill    MRN: [id number]           Visit Information        Provider Department      2/8/2018 9:30 AM Arturo Kd CHAWLA [facility] Generic      Your next 10 appointments already scheduled     Feb 22, 2018 10:30 AM CST   Return Visit with Kd Morris   [facility] ([facility])    [address] [address]   [phone]            Mar 08, 2018  2:00 PM CST   Return Visit with Kd Morris   [facility] ([facility])    [address] [address]   [phone]            Mar 16, 2018  1:30 PM CDT   LAB with [facility] ([facility])    [address] [address]   [phone]           Please do not eat 10-12 hours before your appointment if you are coming in fasting for labs on lipids, cholesterol, or glucose (sugar). This does not apply to pregnant women. Water, hot tea and black coffee (with nothing added) are okay. Do not drink other fluids, diet soda or chew gum.            Mar 16, 2018  1:35 PM CDT   LAB with [facility] ([facility])    [address] [address]   [phone]           Please do not eat 10-12 hours before your appointment if you are coming in fasting for labs on lipids, cholesterol, or glucose (sugar). This does not apply to pregnant women. Water, hot tea and black coffee (with nothing added) are okay. Do not drink other fluids, diet soda or chew gum.            Mar 23, 2018  1:00 PM CDT   Return Visit with Joshua Watts MD   [facility] ([facility])    [facility]  [address]  [facility] [address]   [phone]            Apr 06, 2018  1:30 PM CDT   LAB with [facility]  "([facility])    [address]  [facility] [address]   [phone]           Please do not eat 10-12 hours before your appointment if you are coming in fasting for labs on lipids, cholesterol, or glucose (sugar). This does not apply to pregnant women. Water, hot tea and black coffee (with nothing added) are okay. Do not drink other fluids, diet soda or chew gum.              [employer] Information     Connect[employer] lets you send messages to your doctor, view your test results, renew your prescriptions, schedule appointments and more. To sign up, go to [email or web address] . Click on \"Log in\" on the left side of the screen, which will take you to the Welcome page. Then click on \"Sign up Now\" on the right side of the page.     You will be asked to enter the access code listed below, as well as some personal information. Please follow the directions to create your username and password.     Your access code is: [email or web address]  Expires: 2018 12:16 PM     Your access code will  in 90 days. If you need help or a new code, please call your [facility] clinic or [phone].        Care EveryWhere ID     This is your Care EveryWhere ID. This could be used by other organizations to access your [facility] medical records  [id number]        Equal Access to Services     ANTIONETTE IRBY AH: Hadii jose Clark, waaxda luqadaha, qaybta kaalnitin aguilar. So [facility] [phone].    ATENCIÓN: Si habla español, tiene a terry disposición servicios gratuitos de asistencia lingüística. Natalie al [phone].    We comply with applicable federal civil rights laws and [place] laws. We do not discriminate on the basis of race, color, national origin, age, disability, sex, sexual orientation, or gender identity.            "

## 2018-02-08 NOTE — MR AVS SNAPSHOT
Bia Bill   2/8/2018   Anticoagulation Therapy Visit    Description:  52 year old female   Provider:  Brenna Rausch, RN   Department:  [facility]           INR as of 2/8/2018     Today's INR 2.16      Anticoagulation Summary as of 2/8/2018     INR goal 2.0-2.5   Today's INR 2.16   Full instructions 12.5 mg on Wed, Sat; 10 mg all other days   Next INR check 3/16/2018    Indications   Long term current use of anticoagulant therapy [Z79.01]  DVT (deep venous thrombosis) (H) [I82.409]  PVD (peripheral vascular disease) with claudication (H) [I73.9]         Description     No change, recheck INR in 5 weeks.      February 2018 Details    Sun Mon Tue Wed Thu Fri Sat         1               2               3                 4               5               6               7               8      10 mg   See details      9      10 mg         10      12.5 mg           11      10 mg         12      10 mg         13      10 mg         14      12.5 mg         15      10 mg         16      10 mg         17      12.5 mg           18      10 mg         19      10 mg         20      10 mg         21      12.5 mg         22      10 mg         23      10 mg         24      12.5 mg           25      10 mg         26      10 mg         27      10 mg         28      12.5 mg             Date Details   02/08 This INR check               How to take your warfarin dose     To take:  10 mg Take 2 of the 5 mg tablets.    To take:  12.5 mg Take 2.5 of the 5 mg tablets.           March 2018 Details    Sun Mon Tue Wed Thu Fri Sat         1      10 mg         2      10 mg         3      12.5 mg           4      10 mg         5      10 mg         6      10 mg         7      12.5 mg         8      10 mg         9      10 mg         10      12.5 mg           11      10 mg         12      10 mg         13      10 mg         14      12.5 mg         15      10 mg         16            17                 18               19               20                21               22               23               24                 25               26               27               28               29               30               31                Date Details   No additional details    Date of next INR:  3/16/2018         How to take your warfarin dose     To take:  10 mg Take 2 of the 5 mg tablets.    To take:  12.5 mg Take 2.5 of the 5 mg tablets.

## 2018-02-18 DIAGNOSIS — I82.409 DVT (DEEP VENOUS THROMBOSIS) (H): Primary | Chronic | ICD-10-CM

## 2018-02-18 DIAGNOSIS — G63 POLYNEUROPATHY IN OTHER DISEASES CLASSIFIED ELSEWHERE (H): Chronic | ICD-10-CM

## 2018-02-19 RX ORDER — GABAPENTIN 600 MG/1
TABLET ORAL
Qty: 90 TABLET | Refills: 0 | Status: SHIPPED | OUTPATIENT
Start: 2018-02-19 | End: 2018-03-19

## 2018-02-19 RX ORDER — WARFARIN SODIUM 5 MG/1
TABLET ORAL
Qty: 180 TABLET | Refills: 2 | Status: SHIPPED | OUTPATIENT
Start: 2018-02-19 | End: 2018-02-20

## 2018-02-19 NOTE — TELEPHONE ENCOUNTER
Warfarin      Last Written Prescription Date:  12/27/17  Last Fill Quantity: 30,   # refills: 0  Last Office Visit: 11/13/17  Future Office visit:    Next 5 appointments (look out 90 days)     Feb 22, 2018 10:30 AM CST   Return Visit with Kd Durhamon   [facility] ([facility])    [address] [facility] [address]   [phone]            Mar 08, 2018  2:00 PM CST   Return Visit with Kd CHAWLA [facility] ([facility])    [address] [place] [address]   [phone]            Mar 23, 2018  1:00 PM CDT   Return Visit with Joshua Watts MD   [facility] ([facility])    [facility] [address]  [facility] [address]   [phone]                   GABAPENTIN 600 MG TAB LUCIA        Last Written Prescription Date:  12/18/17  Last Fill Quantity: 90,   # refills: 1  Last Office Visit: 11/13/17  Future Office visit:    Next 5 appointments (look out 90 days)     Feb 22, 2018 10:30 AM CST   Return Visit with Kd CHAWLA [facility] ([facility])    [address] [place] [address]   [phone]            Mar 08, 2018  2:00 PM CST   Return Visit with Kd CHAWLA [facility] ([address] [address]   [phone]            Mar 23, 2018  1:00 PM CDT   Return Visit with Joshua Watts MD   [facility] ([facility])    [facility]  [address] [place] [address] [address]  [facility] [address]   [phone]                   Routing refill request to provider for review/approval because:  Drug not on the [facility], [facility] or [facility] refill protocol or controlled substance    Requested Prescriptions   Pending Prescriptions Disp Refills     gabapentin (NEURONTIN) 600 MG tablet [Pharmacy Med Name: GABAPENTIN 600 MG   TAB LUCIA] 90 tablet 0  "    Sig: TAKE ONE TABLET BY MOUTH THREE TIMES DAILY    There is no refill protocol information for this order        warfarin (COUMADIN) 5 MG tablet [Pharmacy Med Name: WARFARIN 5 MG       TAB TEVA] 72 tablet 1     Sig: TAKE 2TABS BY MOUTH MON-WED-FRI AND 3TABS ON TUES-THURS-SAT-SUN OR AS DIRECTED BY CLINIC    Vitamin K Antagonists Failed    2/19/2018  8:46 AM       Failed - INR is within goal in the past 6 weeks    Confirm INR is within goal in the past 6 weeks.     Recent Labs   Lab Test  02/08/18   1053   INR  2.16*                      Passed - Recent or future visit with authorizing provider's specialty    Patient had office visit in the last year or has a visit in the next 30 days with authorizing provider.  See \"Patient Info\" tab in inbasket, or \"Choose Columns\" in Meds & Orders section of the refill encounter.            Passed - Patient is 18 years of age or older       Passed - Patient is not pregnant       Passed - No positive pregnancy on file in past 12 months          "

## 2018-02-19 NOTE — TELEPHONE ENCOUNTER
Routing gabapentin refill request to provider for review/approval because:  Drug not on the [facility] refill protocol   Mundo Conde RN

## 2018-02-20 RX ORDER — WARFARIN SODIUM 5 MG/1
TABLET ORAL
Qty: 180 TABLET | Refills: 2 | Status: SHIPPED | OUTPATIENT
Start: 2018-02-20 | End: 2018-04-19

## 2018-02-22 ENCOUNTER — OFFICE VISIT (OUTPATIENT)
Dept: PSYCHOLOGY | Facility: CLINIC | Age: 53
End: 2018-02-22
Payer: COMMERCIAL

## 2018-02-22 DIAGNOSIS — F31.62 MODERATE MIXED BIPOLAR I DISORDER (H): Primary | ICD-10-CM

## 2018-02-22 PROCEDURE — 90834 PSYTX W PT 45 MINUTES: CPT | Performed by: PSYCHOLOGIST

## 2018-02-22 NOTE — MR AVS SNAPSHOT
MRN:[id number]                      After Visit Summary   2/22/2018    Bia Bill    MRN: [id number]           Visit Information        Provider Department      2/22/2018 10:30 AM Kd Morris [facility] Generic      Your next 10 appointments already scheduled     Mar 08, 2018  2:00 PM CST   Return Visit with Kd Morris   [facility] ([facility])    [address] [facility] [address]   [phone]            Mar 16, 2018  1:30 PM CDT   LAB with [facility] ([facility])    [address] [address]   [phone]           Please do not eat 10-12 hours before your appointment if you are coming in fasting for labs on lipids, cholesterol, or glucose (sugar). This does not apply to pregnant women. Water, hot tea and black coffee (with nothing added) are okay. Do not drink other fluids, diet soda or chew gum.            Mar 16, 2018  1:35 PM CDT   LAB with [facility] ([facility])    [address] [address]   [phone]           Please do not eat 10-12 hours before your appointment if you are coming in fasting for labs on lipids, cholesterol, or glucose (sugar). This does not apply to pregnant women. Water, hot tea and black coffee (with nothing added) are okay. Do not drink other fluids, diet soda or chew gum.            Mar 22, 2018 10:30 AM CDT   Return Visit with Kd Morris   [facility] ([facility])    [address] [facility] [place] [address]   [phone]            Mar 23, 2018  1:00 PM CDT   Return Visit with Joshua Watts MD   [facility] ([facility])    [facility]  [address]  [place] [address]   [phone]            Apr 06, 2018  1:30 PM CDT   LAB with [facility]  "([facility])    [address]  [facility] [address]   [phone]           Please do not eat 10-12 hours before your appointment if you are coming in fasting for labs on lipids, cholesterol, or glucose (sugar). This does not apply to pregnant women. Water, hot tea and black coffee (with nothing added) are okay. Do not drink other fluids, diet soda or chew gum.              [employer] Information     [employer] lets you send messages to your doctor, view your test results, renew your prescriptions, schedule appointments and more. To sign up, go to [email or web address] . Click on \"Log in\" on the left side of the screen, which will take you to the Welcome page. Then click on \"Sign up Now\" on the right side of the page.     You will be asked to enter the access code listed below, as well as some personal information. Please follow the directions to create your username and password.     Your access code is: [email or web address]  Expires: 2018 12:16 PM     Your access code will  in 90 days. If you need help or a new code, please call your [facility] clinic or [phone].        Care EveryWhere ID     This is your Care EveryWhere ID. This could be used by other organizations to access your [facility] medical records  [id number]        Equal Access to Services     ANTIONETTE IRBY AH: Hadii jose Clark, waaxda luqadaha, qaybta kaalnitin aguilar. So [facility] [phone].    ATENCIÓN: Si habla español, tiene a terry disposición servicios gratuitos de asistencia lingüística. Natalie al [phone].    We comply with applicable federal civil rights laws and [place] laws. We do not discriminate on the basis of race, color, national origin, age, disability, sex, sexual orientation, or gender identity.            "

## 2018-02-23 NOTE — PROGRESS NOTES
Progress Note  Disclaimer: This note consists of symbols derived from keyboarding, dictation and/or voice recognition software. As a result, there may be errors in the script that have gone undetected. Please consider this when interpreting information found in this chart.    Client Name: Bia Bill  Date: 2/22/2018         Service Type: Individual      Session Start Time: 9:30 AM  Session End Time: 10:15 AM      Session Length: 45     Session #: 25     Attendees: Client attended alone    Treatment Plan Last Reviewed: 10/24/2017  PHQ-9 / CHIN-7 : 10     DATA   Client reported she Took another tumble last week.  No serious injuries just banged up.  It is estimated she will probably have some pain for about 6-8 weeks.  She has refused pain medications given her history of addiction.  She reports she is seeing her 14-year-old granddaughter every weekend and this is a great miri for her.  Still arguing with family about whether she should go into assisted living at the age of 52.     Progress Since Last Session (Related to Symptoms / Goals / Homework):   Symptoms: Stable    Homework: Achieved / completed to satisfaction      Episode of Care Goals: Satisfactory progress - ACTION (Actively working towards change); Intervened by reinforcing change plan / affirming steps taken     Current / Ongoing Stressors and Concerns:   Recurrent depression  family relational problems, medical problems, chronic pain, trauma history      Treatment Objective(s) Addressed in This Session:   Increase interest, engagement, and pleasure in doing things  Decrease frequency and intensity of feeling down, depressed, hopeless       Intervention:   CBT: Behavioral activation noted the times since our last meeting when she has asserted and stuck by her boundaries.     ASSESSMENT: Current Emotional / Mental Status (status of significant symptoms):   Risk status (Self / Other harm or suicidal  ideation)   Client denies current fears or concerns for personal safety.   Client denies current or recent suicidal ideation or behaviors.   Client denies current or recent homicidal ideation or behaviors.   Client denies current or recent self injurious behavior or ideation.   Client denies other safety concerns.   A safety and risk management plan has not been developed at this time, however client was given the after-hours number / 911 should there be a change in any of these risk factors.     Appearance:   Appropriate    Eye Contact:   Good    Psychomotor Behavior: Normal    Attitude:   Cooperative    Orientation:   All   Speech    Rate / Production: Normal     Volume:  Normal    Mood:    Normal   Affect:    Appropriate    Thought Content:  Clear    Thought Form:  Coherent  Logical    Insight:    Good      Medication Review:   No changes to current psychiatric medication(s)     Medication Compliance:   Yes     Changes in Health Issues:   None reported     Chemical Use Review:   Substance Use: Chemical use reviewed, no active concerns identified      Tobacco Use: Client reports 5 cigarettes per day      Collateral Reports Completed:   Not Applicable    PLAN: (Client Tasks / Therapist Tasks / Other)  Client to continue With her self-care routine and maintenance of boundaries.   Maintain medication compliance and contact with psychiatry.  .  Kd Morris                                                         ________________________________________________________________________    Treatment Plan    Client's Name: Bia Bill  YOB: 1965    Date: 10/24/2017      DSM5 Diagnoses: (Sustained by DSM5 Criteria Listed Above)  Diagnoses: 296.40 Bipolar I Disorder Current or Most Recent Episode Manic, Unspecified  Psychosocial & Contextual Factors: financial difficulties, chronic pain, roommate issues  WHODAS 2.0 (12 item)               This questionnaire asks about difficulties due to health  conditions. Health conditions             include disease or illnesses, other health problems that may be short or long lasting,             injuries, mental health or emotional problems, and problems with alcohol or drugs.                         Think back over the past 30 days and answer these questions, thinking about how much             difficulty you had doing the following activities. For each question, please [place] only             one response.      S1  Standing for long periods such as 30 minutes?  Mild =           2    S2  Taking care of household responsibilities?  Moderate =   3    S3  Learning a new task, for example, learning how to get to a new place?  Mild =           2    S4  How much of a problem do you have joining community activities (for example, festivals, [employer] or other activities) in the same way as anyone else can?  Moderate =   3    S5  How much have you been emotionally affected by your health problems?  Mild =           2        In the past 30 days, how much difficulty did you have in:    S6  Concentrating on doing something for ten minutes?  Mild =           2    S7  Walking a long distance such as a kilometer (or equivalent)?  Severe =       4    S8  Washing your whole body?  None =         1    S9  Getting dressed?  None =         1    S10  Dealing with people you do not know?  Moderate =   3    S11  Maintaining a friendship?  Severe =       4    S12  Your day to day work?  Moderate =   3       H1  Overall, in the past 30 days, how many days were these difficulties present?  Record number of days seven    H2  In the past 30 days, for how many days were you totally unable to carry out your usual activities or work because of any health condition?  Record number of days  seven    H3  In the past 30 days, not counting the days that you were totally unable, for how many days did you cut back or reduce your usual activities or work because of any health condition?  Record number of  days five                   Referral / Collaboration:  Referral to another professional/service is not indicated at this time..    Anticipated number of session or this episode of care: 15    Goals  1. Education- the Biopsychosocial model of depression  a. Client will be able to describe how depression is effecting them physically, emotionally and socially  2. Behavioral Activation  a. Client will learn to assess their depression on a day to day basis  b. Client will Identify two forms of exercise/activity and engage in them 3 times per week  c. Client will Identify 3 things that make them laugh, and engage in these 5 times per week.  d. Client will Identify 1-2Creative activities or hobbies  and engage in them 2 times per week  e. Client will identify music, movies, books that make them feel good and use them 3-4 times per week  3. Self-care  a. Client will identify 5 things they can do just for themselves  b. Client will take time for quiet, reflection, meditation 5 times per week  c. Client will Learn to set boundaries when appropriate  d. Client will Identify 2 individuals they can call on for support, distraction  4. Assessment of progress  a. Client will engage in assessment of their progress on a regular basis    Bipolar Disorder  Treatment plan:  1. Education- the Biopsychosocial model of Bipolar Disorder    a. Client will be able to describe in general terms what Bipolar Disorder  is and is not  b. Client will be able to describe how Bipolar Disorder  has affected their life in at least two different areas, such as school or work and Home/relationships  c. Clients parents/guardians or significant others will be provided information on what Bipolar Disorder  is and the ways it can affect relationships and be encouraged to be a part of clients treatment team.  2. Medication  a. Client will participate in medication evaluation for Bipolar Disorder  symptoms and follow medication recommendations.    3. Identification and management of triggers  a. Client and therapist will examine patient s history to determine if there are predictable triggers for manic or depressive episodes (e.g. boredom, anger, family stress)  b. Client and therapist will develop means of diffusing these triggers (e.g. relaxation strategies, boundary setting, anger management)  4. Comorbid conditions   a. Client and therapist will asses for comorbid conditions (e.g. anxiety, depression, substance use). and add additional items to treatment plan as needed  5. Self-care  a. Client will identify 3 things they can do just for themselves  b. Client will take time for quiet, reflection, meditation 3 times per week  c. Client will Learn to set boundaries when appropriate  d. Client will Identify 2 individuals they can call on for support, distraction  5. Behavioral Activation  a. Client will Identify two forms of exercise/activity and engage in them 3 times per week  b. Client will Identify 2 things that make them laugh, and engage in these 3 times per week.  c. Client will Identify 2 Creative activities or hobbies  and engage in them 2 times per week  d. Client will identify music, movies, books that make them feel good and use them 3 times per week  6. Assessment of progress  a. Client will engage in assessment of their progress on a regular basis    Client has reviewed and agreed to the above plan.      Kd Morris  10/24/2017 12/27/2017

## 2018-03-01 DIAGNOSIS — F33.1 MAJOR DEPRESSIVE DISORDER, RECURRENT EPISODE, MODERATE (H): ICD-10-CM

## 2018-03-01 NOTE — TELEPHONE ENCOUNTER
"TRAZODONE 150 MG TAB TEVA        Last Written Prescription Date:  1/22/18  Last Fill Quantity: 30,   # refills: 0  Last Office Visit: 11/8/17  Future Office visit:    Next 5 appointments (look out 90 days)     Mar 08, 2018  2:00 PM CST   Return Visit with Kd Morris   [facility] ([facility])    [address] [address]   [phone]            Mar 22, 2018 10:30 AM CDT   Return Visit with Kd Morris   [facility] ([facility])    [address] [address]   [phone]            Mar 23, 2018  1:00 PM CDT   Return Visit with Joshua Watts MD   [facility] ([facility])    [facility]  [address]  [place] [facility] [place] [facility] [place] [address]   [phone]                   Requested Prescriptions   Pending Prescriptions Disp Refills     traZODone (DESYREL) 150 MG tablet [Pharmacy Med Name: TRAZODONE 150 MG    TAB TEVA] 30 tablet 0     Sig: TAKE ONE TABLET BY MOUTH AT BEDTIME    Serotonin Modulators Passed    3/1/2018  2:22 PM       Passed - Recent or future visit with authorizing provider's specialty    Patient had office visit in the last year or has a visit in the next 30 days with authorizing provider.  See \"Patient Info\" tab in inbasket, or \"Choose Columns\" in Meds & Orders section of the refill encounter.            Passed - Patient is age 18 or older       Passed - No active pregnancy on record       Passed - No positive pregnancy test in past 12 months          "

## 2018-03-02 NOTE — TELEPHONE ENCOUNTER
Routing refill request to provider for review/approval because:  PHQ-9= 11    Janice Parry RN

## 2018-03-04 RX ORDER — TRAZODONE HYDROCHLORIDE 150 MG/1
TABLET ORAL
Qty: 30 TABLET | Refills: 0 | Status: SHIPPED | OUTPATIENT
Start: 2018-03-04 | End: 2018-04-02

## 2018-03-07 ENCOUNTER — APPOINTMENT (OUTPATIENT)
Dept: CT IMAGING | Facility: CLINIC | Age: 53
End: 2018-03-07
Attending: EMERGENCY MEDICINE
Payer: COMMERCIAL

## 2018-03-07 ENCOUNTER — APPOINTMENT (OUTPATIENT)
Dept: GENERAL RADIOLOGY | Facility: CLINIC | Age: 53
End: 2018-03-07
Attending: EMERGENCY MEDICINE
Payer: COMMERCIAL

## 2018-03-07 ENCOUNTER — NURSE TRIAGE (OUTPATIENT)
Dept: NURSING | Facility: CLINIC | Age: 53
End: 2018-03-07

## 2018-03-07 ENCOUNTER — HOSPITAL ENCOUNTER (EMERGENCY)
Facility: CLINIC | Age: 53
Discharge: HOME | End: 2018-03-08
Attending: EMERGENCY MEDICINE | Admitting: EMERGENCY MEDICINE
Payer: COMMERCIAL

## 2018-03-07 DIAGNOSIS — I89.0 LYMPHEDEMA OF BOTH LOWER EXTREMITIES: ICD-10-CM

## 2018-03-07 DIAGNOSIS — J44.9 CHRONIC OBSTRUCTIVE PULMONARY DISEASE, UNSPECIFIED COPD TYPE (H): ICD-10-CM

## 2018-03-07 DIAGNOSIS — J44.1 COPD EXACERBATION (H): ICD-10-CM

## 2018-03-07 DIAGNOSIS — R10.31 RLQ ABDOMINAL PAIN: ICD-10-CM

## 2018-03-07 LAB
ALBUMIN SERPL-MCNC: 3.1 G/DL (ref 3.4–5)
ALBUMIN UR-MCNC: NEGATIVE MG/DL
ALP SERPL-CCNC: 86 U/L (ref 40–150)
ALT SERPL W P-5'-P-CCNC: 26 U/L (ref 0–50)
ANION GAP SERPL CALCULATED.3IONS-SCNC: 4 MMOL/L (ref 3–14)
APPEARANCE UR: CLEAR
AST SERPL W P-5'-P-CCNC: 25 U/L (ref 0–45)
BASOPHILS # BLD AUTO: 0 10E9/L (ref 0–0.2)
BASOPHILS NFR BLD AUTO: 0.5 %
BILIRUB SERPL-MCNC: 0.6 MG/DL (ref 0.2–1.3)
BILIRUB UR QL STRIP: NEGATIVE
BUN SERPL-MCNC: 7 MG/DL (ref 7–30)
CALCIUM SERPL-MCNC: 8.6 MG/DL (ref 8.5–10.1)
CHLORIDE SERPL-SCNC: 105 MMOL/L (ref 94–109)
CO2 SERPL-SCNC: 30 MMOL/L (ref 20–32)
COLOR UR AUTO: YELLOW
CREAT SERPL-MCNC: 0.71 MG/DL (ref 0.52–1.04)
DIFFERENTIAL METHOD BLD: ABNORMAL
EOSINOPHIL # BLD AUTO: 0 10E9/L (ref 0–0.7)
EOSINOPHIL NFR BLD AUTO: 0.8 %
ERYTHROCYTE [DISTWIDTH] IN BLOOD BY AUTOMATED COUNT: 21.7 % (ref 10–15)
GFR SERPL CREATININE-BSD FRML MDRD: 86 ML/MIN/1.7M2
GLUCOSE SERPL-MCNC: 109 MG/DL (ref 70–99)
GLUCOSE UR STRIP-MCNC: NEGATIVE MG/DL
HCT VFR BLD AUTO: 56.7 % (ref 35–47)
HGB BLD-MCNC: 18.2 G/DL (ref 11.7–15.7)
HGB UR QL STRIP: NEGATIVE
IMM GRANULOCYTES # BLD: 0 10E9/L (ref 0–0.4)
IMM GRANULOCYTES NFR BLD: 0.3 %
INR PPP: 1.62 (ref 0.86–1.14)
KETONES UR STRIP-MCNC: NEGATIVE MG/DL
LEUKOCYTE ESTERASE UR QL STRIP: NEGATIVE
LIPASE SERPL-CCNC: 100 U/L (ref 73–393)
LYMPHOCYTES # BLD AUTO: 1.3 10E9/L (ref 0.8–5.3)
LYMPHOCYTES NFR BLD AUTO: 34.6 %
MCH RBC QN AUTO: 28.5 PG (ref 26.5–33)
MCHC RBC AUTO-ENTMCNC: 32.1 G/DL (ref 31.5–36.5)
MCV RBC AUTO: 89 FL (ref 78–100)
MONOCYTES # BLD AUTO: 0.4 10E9/L (ref 0–1.3)
MONOCYTES NFR BLD AUTO: 10.7 %
NEUTROPHILS # BLD AUTO: 2 10E9/L (ref 1.6–8.3)
NEUTROPHILS NFR BLD AUTO: 53.1 %
NITRATE UR QL: NEGATIVE
PH UR STRIP: 6 PH (ref 5–7)
PLATELET # BLD AUTO: 71 10E9/L (ref 150–450)
POTASSIUM SERPL-SCNC: 3.2 MMOL/L (ref 3.4–5.3)
PROT SERPL-MCNC: 6.4 G/DL (ref 6.8–8.8)
RBC # BLD AUTO: 6.38 10E12/L (ref 3.8–5.2)
SODIUM SERPL-SCNC: 139 MMOL/L (ref 133–144)
SOURCE: NORMAL
SP GR UR STRIP: 1.01 (ref 1–1.03)
UROBILINOGEN UR STRIP-MCNC: 0 MG/DL (ref 0–2)
WBC # BLD AUTO: 3.8 10E9/L (ref 4–11)

## 2018-03-07 PROCEDURE — 85025 COMPLETE CBC W/AUTO DIFF WBC: CPT | Performed by: EMERGENCY MEDICINE

## 2018-03-07 PROCEDURE — 94640 AIRWAY INHALATION TREATMENT: CPT

## 2018-03-07 PROCEDURE — 71046 X-RAY EXAM CHEST 2 VIEWS: CPT

## 2018-03-07 PROCEDURE — 99285 EMERGENCY DEPT VISIT HI MDM: CPT | Mod: Z6 | Performed by: EMERGENCY MEDICINE

## 2018-03-07 PROCEDURE — 25000125 ZZHC RX 250: Performed by: EMERGENCY MEDICINE

## 2018-03-07 PROCEDURE — 36415 COLL VENOUS BLD VENIPUNCTURE: CPT | Performed by: EMERGENCY MEDICINE

## 2018-03-07 PROCEDURE — 85610 PROTHROMBIN TIME: CPT | Performed by: EMERGENCY MEDICINE

## 2018-03-07 PROCEDURE — 80053 COMPREHEN METABOLIC PANEL: CPT | Performed by: EMERGENCY MEDICINE

## 2018-03-07 PROCEDURE — 96361 HYDRATE IV INFUSION ADD-ON: CPT | Performed by: EMERGENCY MEDICINE

## 2018-03-07 PROCEDURE — 74177 CT ABD & PELVIS W/CONTRAST: CPT

## 2018-03-07 PROCEDURE — 25000128 H RX IP 250 OP 636: Performed by: EMERGENCY MEDICINE

## 2018-03-07 PROCEDURE — 96375 TX/PRO/DX INJ NEW DRUG ADDON: CPT | Performed by: EMERGENCY MEDICINE

## 2018-03-07 PROCEDURE — 83690 ASSAY OF LIPASE: CPT | Performed by: EMERGENCY MEDICINE

## 2018-03-07 PROCEDURE — 96374 THER/PROPH/DIAG INJ IV PUSH: CPT | Performed by: EMERGENCY MEDICINE

## 2018-03-07 PROCEDURE — 99285 EMERGENCY DEPT VISIT HI MDM: CPT | Mod: 25 | Performed by: EMERGENCY MEDICINE

## 2018-03-07 PROCEDURE — 81003 URINALYSIS AUTO W/O SCOPE: CPT | Performed by: EMERGENCY MEDICINE

## 2018-03-07 RX ORDER — KETOROLAC TROMETHAMINE 30 MG/ML
30 INJECTION, SOLUTION INTRAMUSCULAR; INTRAVENOUS ONCE
Status: COMPLETED | OUTPATIENT
Start: 2018-03-07 | End: 2018-03-07

## 2018-03-07 RX ORDER — FUROSEMIDE 20 MG
TABLET ORAL
Qty: 60 TABLET | Refills: 0 | Status: SHIPPED | OUTPATIENT
Start: 2018-03-07 | End: 2018-04-03

## 2018-03-07 RX ORDER — BUPROPION HYDROCHLORIDE 150 MG/1
TABLET ORAL
Qty: 30 TABLET | Refills: 0 | Status: SHIPPED | OUTPATIENT
Start: 2018-03-07 | End: 2018-04-03

## 2018-03-07 RX ORDER — IOPAMIDOL 755 MG/ML
100 INJECTION, SOLUTION INTRAVASCULAR ONCE
Status: COMPLETED | OUTPATIENT
Start: 2018-03-07 | End: 2018-03-07

## 2018-03-07 RX ORDER — HYDROMORPHONE HYDROCHLORIDE 1 MG/ML
0.5 INJECTION, SOLUTION INTRAMUSCULAR; INTRAVENOUS; SUBCUTANEOUS ONCE
Status: COMPLETED | OUTPATIENT
Start: 2018-03-07 | End: 2018-03-07

## 2018-03-07 RX ORDER — FENTANYL CITRATE 50 UG/ML
75 INJECTION, SOLUTION INTRAMUSCULAR; INTRAVENOUS ONCE
Status: COMPLETED | OUTPATIENT
Start: 2018-03-07 | End: 2018-03-07

## 2018-03-07 RX ORDER — IPRATROPIUM BROMIDE AND ALBUTEROL SULFATE 2.5; .5 MG/3ML; MG/3ML
3 SOLUTION RESPIRATORY (INHALATION) ONCE
Status: COMPLETED | OUTPATIENT
Start: 2018-03-07 | End: 2018-03-07

## 2018-03-07 RX ADMIN — FENTANYL CITRATE 75 MCG: 50 INJECTION, SOLUTION INTRAMUSCULAR; INTRAVENOUS at 19:58

## 2018-03-07 RX ADMIN — KETOROLAC TROMETHAMINE 30 MG: 30 INJECTION, SOLUTION INTRAMUSCULAR at 21:30

## 2018-03-07 RX ADMIN — SODIUM CHLORIDE 500 ML: 9 INJECTION, SOLUTION INTRAVENOUS at 19:58

## 2018-03-07 RX ADMIN — Medication 0.5 MG: at 22:26

## 2018-03-07 RX ADMIN — IOPAMIDOL 100 ML: 755 INJECTION, SOLUTION INTRAVENOUS at 20:48

## 2018-03-07 RX ADMIN — SODIUM CHLORIDE 60 ML: 9 INJECTION, SOLUTION INTRAVENOUS at 20:48

## 2018-03-07 RX ADMIN — IPRATROPIUM BROMIDE AND ALBUTEROL SULFATE 3 ML: .5; 3 SOLUTION RESPIRATORY (INHALATION) at 23:02

## 2018-03-07 ASSESSMENT — ENCOUNTER SYMPTOMS
BLOOD IN STOOL: 1
VOMITING: 0
ABDOMINAL PAIN: 1
COUGH: 1
DYSURIA: 1
BACK PAIN: 1
NAUSEA: 1
DIARRHEA: 1

## 2018-03-07 NOTE — ED AVS SNAPSHOT
[facility] Emergency Department    [address] [facility] [address] [address]    Phone:  [phone]    Fax:  [phone]                                       Bia Bill   MRN: [id number]    Department:  [facility] Emergency Department   Date of Visit:  3/7/2018           Patient Information     Date Of Birth          1965        Your diagnoses for this visit were:     RLQ abdominal pain     Chronic obstructive pulmonary disease, unspecified COPD type (H)        You were seen by Haim Blankenship MD.      Follow-up Information     Follow up with Kd Leong MD.    Specialty:  Family Practice    Why:  Next week for recheck    Contact information:    [facility]  [address] [facility] [address]  [phone]          Follow up with [facility] Emergency Department.    Specialty:  EMERGENCY MEDICINE    Why:  If symptoms worsen    Contact information:    [address] [place] [id number] [phone]    Additional information:    The medical center is located at   [address] [place] [address]. (between [facility] and   [facility] in [place], four miles north   of [place]).        Discharge Instructions       .      Return if symptoms worsen or new symptoms develop.  Follow-up with primary care physician next week for recheck.  Take double dose of Lasix in the morning and if continued mildly short of breath double dose again in the morning on Friday.  No obvious source of your abdominal pain was found on CT scan.  After your breathing treatment her lungs sounded more clear in your setting in the low to mid 90s. You  felt comfortable going home at this time.  If any further abdominal pain shortness of breath or other symptoms occur please return for further assessment and care.  Your chest x-ray did show some pulmonary vascular congestion and therefore take the Lasix as above.  *Abdominal Pain, Unknown Cause (Female)    The exact cause of your abdominal (stomach)  pain is not certain. This does not mean that this is something to worry about, or the right tests were not done. Everyone likes to know the exact cause of the problem, but sometimes with abdominal pain, there is no clear-cut cause, and this could be a good thing. The good news is that your symptoms can be treated, and you will feel better.   Your condition does not seem serious now; however, sometimes the signs of a serious problem may take more time to appear. For this reason, it is important for you to watch for any new symptoms, problems, or worsening of your condition.  Over the next few days, the abdominal pain may come and go, or be continuous. Other common symptoms can include nausea and vomiting. Sometimes it can be difficult to tell if you feel nauseous, you may just feel bad and not associate that feeling with nausea. Constipation, diarrhea, and a fever may go along with the pain.  The pain may continue even if treated correctly over the following days. Depending on how things go, sometimes the cause can become clear and may require further or different treatment. Additional evaluations, medications, or tests may be needed.  Home care  Your health care provider may prescribe medications for pain, symptoms, or an infection.  Follow the health care provider's instructions for taking these medications.  General care    Rest until your next exam. No strenuous activities.    Try to find positions that ease discomfort. A small pillow placed on the abdomen may help relieve pain.    Something warm on your abdomen (such as a heating pad) may help, but be careful not to burn yourself.  Diet    Do not force yourself to eat, especially if having cramps, vomiting, or diarrhea.    Water is important so you do not get dehydrated. Soup may also be good. Sports drinks may also help, especially if they are not too acidic. Make sure you don't drink sugary drinks as this can make things worse. Take liquids in small amounts.  Do not guzzle them.    Caffeine sometimes makes the pain and cramping worse.    Avoid dairy products if you have vomiting or diarrhea.    Don't eat large amounts at a time. Wait a few minutes between bites.    Eat a diet low in fiber (called a low-residue diet). Foods allowed include refined breads, white rice, fruit and vegetable juices without pulp, tender meats. These foods will pass more easily through the intestine.    Avoid fried or fatty foods, dairy, alcohol and spicy foods until your symptoms go away.  Follow-up care  Follow up with your health care provider as instructed, or if your pain does not begin to improve in the next 24 hours.  When to seek medical care  Seek prompt medical care if any of the following occur:    Pain gets worse or moves to the right lower abdomen    New or worsening vomiting or diarrhea    Swelling of the abdomen    Unable to pass stool for more than three days    New fever over 101  F (38.3 C), or rising fever    Blood in vomit or bowel movements (dark red or black color)    Jaundice (yellow color of eyes and skin)    Weakness, dizziness    Chest, arm, back, neck or jaw pain    Unexpected vaginal bleeding or missed period  Call 911  Call emergency services if any of the following occur:    Trouble breathing    Confusion    Fainting or loss of consciousness    Rapid heart rate    Seizure    [id number] [facility], [address] [place] [address], [place] [id number]. All rights reserved. This information is not intended as a substitute for professional medical care. Always follow your healthcare professional's instructions.          Discharge Instructions: COPD  You have been diagnosed with chronic obstructive pulmonary disease (COPD). This is a name given to a group of diseases that limit the flow of air in and out of your lungs. This makes it harder to breathe. With COPD, you are also more likely to get lung infections. COPD includes chronic bronchitis and emphysema. COPD is most often  caused by heavy, long-term cigarette smoking.  Home care  Quit smoking    If you smoke, quit. It is the best thing you can do for your COPD and your overall health.    Join a stop-smoking program. There are even telephone, text message, and Internet programs to help you quit.    Ask your healthcare provider about medicines or other methods to help you quit.    Ask family members to quit smoking as well.    Don't allow people to smoke in your home, in your car, or when they are around you.  Protect yourself from infection    Wash your hands often. Do your best to keep your hands away from your face. Most germs are spread from your hands to your mouth.    Get a flu shot every year. Also ask your provider about pneumonia vaccines.    Avoid crowds. It's especially important to do this in the winter when more people have colds and flu.    To stay healthy, get enough sleep, exercise regularly, and eat a balanced diet. You should:    Get about 8 hours of sleep every night.    Try to exercise for at least 30 minutes on most days.    Have healthy foods including fruits and vegetables, 100% whole grains, lean meats and fish, and low-fat dairy products. Try to stay away from foods high in fats and sugar.  Take your medicines  Take your medicines exactly as directed. Don't skip doses.  Manage your stress  Stress can make COPD worse. Use this stress management technique:    Find a quiet place and sit or lie in a comfortable position.    Close your eyes and perform breathing exercises for several minutes. Ask your provider about the best way to breathe.  Pulmonary rehabilitation    Pulmonary rehab can help you feel better. These programs include exercise, breathing techniques, information about COPD, counseling, and help for smokers.    Ask your provider or your local hospital about programs in your area.  When to call your healthcare provider  Call your provider immediately if you have any of the following:    Shortness of  breath, wheezing, or coughing    Increased mucus    Yellow, green, bloody, or smelly mucus    Fever or chills    Tightness in your chest that does not go away with rest or medicine    An irregular heartbeat or a feeling that your heart is beating very fast    Swollen ankles   Date Last Reviewed: 5/1/2016 2000-2017 [employer]. [address] [place] [address]. All rights reserved. This information is not intended as a substitute for professional medical care. Always follow your healthcare professional's instructions.    Future Appointments        Provider Department Dept Phone Center    [phone]    3/16/2018 1:30 PM [facility] Emergency Department [phone] [facility]    3/16/2018 1:35 PM [facility] Emergency Department [phone] [facility]    3[facility]22/2018 10[facility]30 [facility] [phone] [facility]    3/23/2018 1:00 PM Joshua Watts MD [facility] [phone] [facility]    4/6/2018 1:30 PM [facility] Emergency Department [phone] [facility]      24 Hour Appointment Hotline       To make an appointment at any [facility], call [phone] ([phone]). If you don't have a family doctor or clinic, we will help you find one. [facility] are conveniently located to serve the needs of you and your family.             Review of your medicines      START taking        Dose / Directions Last dose taken    HYDROmorphone 2 MG tablet   Commonly known as:  DILAUDID   Dose:  2 mg   Quantity:  18 tablet        Take 1 tablet (2 mg) by mouth every 4 hours as needed for pain   Refills:  0          Our records show that you are taking the medicines listed below. If these are incorrect, please call your family doctor or clinic.        Dose / Directions Last dose taken    acetaminophen 500 MG tablet   Commonly known as:  TYLENOL   Dose:  500-1000 mg        Take  500-1,000 mg by mouth every 6 hours as needed for mild pain   Refills:  0        * albuterol 108 (90 BASE) MCG/ACT Inhaler   Commonly known as:  PROAIR HFA/PROVENTIL HFA/VENTOLIN HFA   Dose:  2 puff   Quantity:  3 Inhaler        Inhale 2 puffs into the lungs every 6 hours as needed for shortness of breath / dyspnea or wheezing   Refills:  1        * albuterol (2.5 MG/3ML) 0.083% neb solution   Dose:  1 vial   Quantity:  1 Box        Take 1 vial (2.5 mg) by nebulization every 6 hours as needed for shortness of breath / dyspnea or wheezing   Refills:  0        allopurinol 100 MG tablet   Commonly known as:  ZYLOPRIM   Quantity:  90 tablet        TAKE ONE TABLET BY MOUTH ONE TIME DAILY   Refills:  2        ARIPiprazole 5 MG tablet   Commonly known as:  ABILIFY   Dose:  7.5 mg   Quantity:  180 tablet        Take 1.5 tablets (7.5 mg) by mouth At Bedtime   Refills:  3        buPROPion 150 MG 24 hr tablet   Commonly known as:  WELLBUTRIN XL   Quantity:  30 tablet        TAKE  ONE TABLET BY MOUTH EVERY DAY IN THE MORNING   Refills:  0        calcium citrate-vitamin D 315-250 MG-UNIT Tabs per tablet   Commonly known as:  CITRACAL   Dose:  2 tablet        Take 2 tablets by mouth daily   Refills:  0        cholecalciferol 1000 UNIT tablet   Commonly known as:  vitamin D3   Quantity:  90 tablet        TAKE ONE TABLET BY MOUTH ONE TIME DAILY   Refills:  3        EPINEPHrine 0.3 MG/0.3ML injection 2-pack   Commonly known as:  EPIPEN/ADRENACLICK/or ANY BX GENERIC EQUIV   Dose:  0.3 mg   Quantity:  0.6 mL        Inject 0.3 mLs (0.3 mg) into the muscle once as needed for anaphylaxis   Refills:  0        furosemide 20 MG tablet   Commonly known as:  LASIX   Quantity:  60 tablet        TAKE ONE TABLET BY MOUTH TWICE DAILY   Refills:  0        gabapentin 600 MG tablet   Commonly known as:  NEURONTIN   Quantity:  90 tablet        TAKE ONE TABLET BY MOUTH THREE TIMES DAILY   Refills:  0        lamoTRIgine 200 MG tablet   Commonly known  "as:  LaMICtal   Dose:  200 mg   Quantity:  90 tablet        Take 1 tablet (200 mg) by mouth daily   Refills:  3        levothyroxine 150 MCG tablet   Commonly known as:  SYNTHROID/LEVOTHROID   Quantity:  90 tablet        TAKE ONE TABLET BY MOUTH DAILY   Refills:  2        metoclopramide 10 MG tablet   Commonly known as:  REGLAN   Dose:  10 mg   Quantity:  40 tablet        Take 1 tablet (10 mg) by mouth 4 times daily as needed   Refills:  0        mometasone-formoterol 100-5 MCG/ACT oral inhaler   Commonly known as:  DULERA   Dose:  2 puff   Quantity:  13 g        Inhale 2 puffs into the lungs 2 times daily   Refills:  11        omeprazole 20 MG CR capsule   Commonly known as:  priLOSEC   Quantity:  180 capsule        TAKE 1 CAPSULE (20 MG) BY MOUTH 2 TIMES DAILY   Refills:  1        * order for DME        Equipment being ordered: CPAP AIRSENSE 10 5-18 CM H20 SN# [id number]   DN# [id number]   Refills:  0        * order for DME   Quantity:  1 Month        Equipment being ordered: INCONTINENCE PADS  QID   Refills:  11        * order for DME   Quantity:  60 Month        Equipment being ordered: DEPENDS SIZE LARGE   Refills:  5        * order for DME   Quantity:  2 Units        Equipment being ordered: [id number]\" x2pair   \"20-30mmHg [place] Hybrid Liners\" x 2 pair   Refills:  11        simvastatin 20 MG tablet   Commonly known as:  ZOCOR   Quantity:  90 tablet        TAKE ONE TABLET BY MOUTH AT BEDTIME   Refills:  3        traZODone 150 MG tablet   Commonly known as:  DESYREL   Quantity:  30 tablet        TAKE ONE TABLET BY MOUTH AT BEDTIME   Refills:  0        warfarin 5 MG tablet   Commonly known as:  COUMADIN   Quantity:  180 tablet        12.5 mg on Wed and Sat 10 mg all other days   Refills:  2        * Notice:  This list has 6 medication(s) that are the same as other medications prescribed for you. Read the directions carefully, and ask your doctor or other care provider to review them with you.          "   Prescriptions were sent or printed at these locations (1 Prescription)                   [id number] - [place], [place] - [address]   [address] [address]    Telephone:  [phone]   Fax:  [phone]   Hours:  Closed 10-16-08 business to [facility]                Printed at Department/Unit printer (1 of 1)         HYDROmorphone (DILAUDID) 2 MG tablet                Procedures and tests performed during your visit     CBC with platelets, differential    CT Abdomen Pelvis w Contrast    Chest XR,  PA & LAT    Comprehensive metabolic panel    INR    Lipase    UA reflex to Microscopic      Orders Needing Specimen Collection     None      Pending Results     Date and Time Order Name Status Description    3/7/2018 2338 Chest XR,  PA & LAT Preliminary             Pending Culture Results     No orders found for last 3 day(s).            Pending Results Instructions     If you had any lab results that were not finalized at the time of your Discharge, you can call the ED Lab Result RN at [phone]. You will be contacted by this team for any positive Lab results or changes in treatment. The nurses are available 7 days a week from 10A to 6:30P.  You can leave a message 24 hours per day and they will return your call.        Test Results From Your Hospital Stay        3/7/2018  8:07 PM      Component Results     Component Value Ref Range & Units Status    WBC 3.8 (L) 4.0 - 11.0 10e9/L Final    RBC Count 6.38 (H) 3.8 - 5.2 10e12/L Final    Hemoglobin 18.2 (H) 11.7 - 15.7 g/dL Final    Hematocrit 56.7 (H) 35.0 - 47.0 % Final    MCV 89 78 - 100 fl Final    MCH 28.5 26.5 - 33.0 pg Final    MCHC 32.1 31.5 - 36.5 g/dL Final    RDW 21.7 (H) 10.0 - 15.0 % Final    Platelet Count 71 (L) 150 - 450 10e9/L Final    Diff Method Automated Method  Final    % Neutrophils 53.1 % Final    % Lymphocytes 34.6 % Final    % Monocytes 10.7 % Final    % Eosinophils 0.8 % Final    % Basophils 0.5 % Final     % Immature Granulocytes 0.3 % Final    Absolute Neutrophil 2.0 1.6 - 8.3 10e9/L Final    Absolute Lymphocytes 1.3 0.8 - 5.3 10e9/L Final    Absolute Monocytes 0.4 0.0 - 1.3 10e9/L Final    Absolute Eosinophils 0.0 0.0 - 0.7 10e9/L Final    Absolute Basophils 0.0 0.0 - 0.2 10e9/L Final    Abs Immature Granulocytes 0.0 0 - 0.4 10e9/L Final         3/7/2018  8:19 PM      Component Results     Component Value Ref Range & Units Status    Sodium 139 133 - 144 mmol/L Final    Potassium 3.2 (L) 3.4 - 5.3 mmol/L Final    Chloride 105 94 - 109 mmol/L Final    Carbon Dioxide 30 20 - 32 mmol/L Final    Anion Gap 4 3 - 14 mmol/L Final    Glucose 109 (H) 70 - 99 mg/dL Final    Urea Nitrogen 7 7 - 30 mg/dL Final    Creatinine 0.71 0.52 - 1.04 mg/dL Final    GFR Estimate 86 >60 mL/min/1.7m2 Final    Non  GFR Calc    GFR Estimate If Black >90 >60 mL/min/1.7m2 Final    African American GFR Calc    Calcium 8.6 8.5 - 10.1 mg/dL Final    Bilirubin Total 0.6 0.2 - 1.3 mg/dL Final    Albumin 3.1 (L) 3.4 - 5.0 g/dL Final    Protein Total 6.4 (L) 6.8 - 8.8 g/dL Final    Alkaline Phosphatase 86 40 - 150 U/L Final    ALT 26 0 - 50 U/L Final    AST 25 0 - 45 U/L Final         3/7/2018  8:17 PM      Component Results     Component Value Ref Range & Units Status    Lipase 100 73 - 393 U/L Final         3/7/2018  7:57 PM      Component Results     Component Value Ref Range & Units Status    Color Urine Yellow  Final    Appearance Urine Clear  Final    Glucose Urine Negative NEG^Negative mg/dL Final    Bilirubin Urine Negative NEG^Negative Final    Ketones Urine Negative NEG^Negative mg/dL Final    Specific Gravity Urine 1.006 1.003 - 1.035 Final    Blood Urine Negative NEG^Negative Final    pH Urine 6.0 5.0 - 7.0 pH Final    Protein Albumin Urine Negative NEG^Negative mg/dL Final    Urobilinogen mg/dL 0.0 0.0 - 2.0 mg/dL Final    Nitrite Urine Negative NEG^Negative Final    Leukocyte Esterase Urine Negative NEG^Negative Final     Source Midstream Urine  Final         3/7/2018 10:43 PM      Narrative     CT ABDOMEN AND PELVIS WITH CONTRAST 3/7/2018 8:52 PM     HISTORY: Right lower quadrant abdominal pain.    COMPARISON: 10/18/2017    TECHNIQUE: Volumetric helical acquisition of CT images from the lung  bases through the symphysis pubis after the administration of 100 mL  Isovue-370 intravenous contrast. Radiation dose for this scan was  reduced using automated exposure control, adjustment of the mA and/or  kV according to patient size, or iterative reconstruction technique.    FINDINGS: The liver, adrenal glands, pancreas, and spleen demonstrate  no worrisome focal lesion. Normal appendix. At least moderate fatty  infiltration of the liver. Unremarkable gallbladder. A few low-dense  lesions are seen in the kidneys which are too small to definitively  characterize and therefore indeterminate, but based on prevalence in a  patient without a known malignancy are most likely small cysts. There  is no pelvic free fluid. Hysterectomy changes. No hydronephrosis. No  diverticulitis. No free air in the abdomen. Bone windows reveal no  destructive lesions. There are no abdominal or pelvic lymph nodes that  are abnormal by size criteria. The visualized lung bases trace  peripheral fibrosis and/or atelectasis at the lung bases. There are no  dilated loops of small bowel or colon.        Impression     IMPRESSION: No acute process demonstrated within the abdomen and  pelvis.    JUDE DAWSON MD         3/7/2018 11:22 PM      Component Results     Component Value Ref Range & Units Status    INR 1.62 (H) 0.86 - 1.14 Final         3/8/2018 12:21 AM      Narrative     CHEST 2 VIEWS  3/7/2018 11:53 PM     HISTORY: Cough.    COMPARISON: 10/19/2017.    FINDINGS: Increased pulmonary vascularity. No convincing pulmonary  opacities. Mild cardiomegaly again noted. Atherosclerotic  calcification in the thoracic aorta.        Impression     IMPRESSION:  1.  "Increased pulmonary vascularity, suggestive of pulmonary vascular  congestion.  2. No other findings suspicious for active cardiopulmonary disease.                Thank you for choosing [facility]       Thank you for choosing [facility] for your care. Our goal is always to provide you with excellent care. Hearing back from our patients is one way we can continue to improve our services. Please take a few minutes to complete the written survey that you may receive in the mail after you visit with us. Thank you!        [employer] Information     [employer] lets you send messages to your doctor, view your test results, renew your prescriptions, schedule appointments and more. To sign up, go to [email or web address] . Click on \"Log in\" on the left side of the screen, which will take you to the Welcome page. Then click on \"Sign up Now\" on the right side of the page.     You will be asked to enter the access code listed below, as well as some personal information. Please follow the directions to create your username and password.     Your access code is: [email or web address]  Expires: 2018 12:16 PM     Your access code will  in 90 days. If you need help or a new code, please call your [facility] clinic or [phone].        Care EveryWhere ID     This is your Care EveryWhere ID. This could be used by other organizations to access your [facility] medical records  [id number]        Equal Access to Services     ANTIONETTE IRBY : Hadmaryann estrellao Sojossy, waaxda luqadaha, qaybta kaalmada obed, nitin craig. So [facility] [phone].    ATENCIÓN: Si habla español, tiene a terry disposición servicios gratuitos de asistencia lingüística. Llame al [phone].    We comply with applicable federal civil rights laws and [place] laws. We do not discriminate on the basis of race, color, national origin, age, disability, sex, sexual orientation, or gender identity.            After Visit Summary       This " is your record. Keep this with you and show to your community pharmacist(s) and doctor(s) at your next visit.

## 2018-03-07 NOTE — ED AVS SNAPSHOT
[facility] Emergency Department    [address] [facility] [address]    Phone:  [phone]    Fax:  [phone]                                       Bia Bill   MRN: [id number]    Department:  [facility] Emergency Department   Date of Visit:  3/7/2018           After Visit Summary Signature Page     I have received my discharge instructions, and my questions have been answered. I have discussed any challenges I see with this plan with the nurse or doctor.    ..........................................................................................................................................  Patient/Patient Representative Signature      ..........................................................................................................................................  Patient Representative Print Name and Relationship to Patient    ..................................................               ................................................  Date                                            Time    ..........................................................................................................................................  Reviewed by Signature/Title    ...................................................              ..............................................  Date                                                            Time

## 2018-03-07 NOTE — TELEPHONE ENCOUNTER
"Requested Prescriptions   Pending Prescriptions Disp Refills     buPROPion (WELLBUTRIN XL) 150 MG 24 hr tablet [Pharmacy Med Name: BUPROPN HCL X 150MG TAB PARP] 30 tablet 0     Sig: TAKE  ONE TABLET BY MOUTH EVERY DAY IN THE MORNING    SSRIs Protocol Passed    3/7/2018  9:45 AM       Passed - Recent (12 mo) or future (30 days) visit within the authorizing provider's specialty    Patient had office visit in the last year or has a visit in the next 30 days with authorizing provider.  See \"Patient Info\" tab in inbasket, or \"Choose Columns\" in Meds & Orders section of the refill encounter.            Passed - Medication is Bupropion    If the medication is Bupropion (Wellbutrin), and the patient is taking for smoking cessation; OK to refill.         Passed - Patient is age 18 or older       Passed - No active pregnancy on record       Passed - No positive pregnancy test in last 12 months        furosemide (LASIX) 20 MG tablet [Pharmacy Med Name: FUROSEMIDE 20 MG    TAB SOLC] 60 tablet 0     Sig: TAKE ONE TABLET BY MOUTH TWICE DAILY    Diuretics (Including Combos) Protocol Failed    3/7/2018  9:45 AM       Failed - Blood pressure under 140/90 in past 12 months    BP Readings from Last 3 Encounters:   12/01/17 147/80   11/13/17 129/64   11/08/17 132/63                Passed - Recent (12 mo) or future (30 days) visit within the authorizing provider's specialty    Patient had office visit in the last year or has a visit in the next 30 days with authorizing provider.  See \"Patient Info\" tab in inbasket, or \"Choose Columns\" in Meds & Orders section of the refill encounter.            Passed - Patient is age 18 or older       Passed - No active pregancy on record       Passed - Normal serum creatinine on file in past 12 months    Recent Labs   Lab Test  11/13/17   1338   CR  0.78             Passed - Normal serum potassium on file in past 12 months    Recent Labs   Lab Test  11/13/17   1338   POTASSIUM  3.4                   " Passed - Normal serum sodium on file in past 12 months    Recent Labs   Lab Test  11/13/17   1338   NA  137             Passed - No positive pregnancy test in past 12 months        Last Written Prescription Date:  12/7/17  Last Fill Quantity: 90,  # refills: 0   Last office visit: 11/13/2017 with prescribing provider:  [facility]   Future Office Visit:   Next 5 appointments (look out 90 days)     Mar 08, 2018  2:00 PM CST   Return Visit with [facility] ([address] [address]   [phone]            Mar 22, 2018 10:30 AM CDT   Return Visit with [facility] ([facility])    [address] [facility] [address]   [phone]            Mar 23, 2018  1:00 PM CDT   Return Visit with Joshua Watts MD   [facility] ([facility])    [facility]  [address]  [facility] [address]   [phone]

## 2018-03-07 NOTE — TELEPHONE ENCOUNTER
**This refill requires provider completion and is not appropriate for RN review per RN refill guidelines.**  PH-Q9 needs to be less than 5 to approve medication on RN Refill protocol pt's score is 11.  Bailey Lozano RN

## 2018-03-08 ENCOUNTER — OFFICE VISIT (OUTPATIENT)
Dept: PSYCHOLOGY | Facility: CLINIC | Age: 53
End: 2018-03-08
Payer: COMMERCIAL

## 2018-03-08 VITALS
HEART RATE: 75 BPM | RESPIRATION RATE: 16 BRPM | OXYGEN SATURATION: 95 % | SYSTOLIC BLOOD PRESSURE: 130 MMHG | TEMPERATURE: 98.3 F | DIASTOLIC BLOOD PRESSURE: 84 MMHG

## 2018-03-08 DIAGNOSIS — F31.62 MODERATE MIXED BIPOLAR I DISORDER (H): Primary | ICD-10-CM

## 2018-03-08 PROCEDURE — 90834 PSYTX W PT 45 MINUTES: CPT | Performed by: PSYCHOLOGIST

## 2018-03-08 RX ORDER — HYDROMORPHONE HYDROCHLORIDE 2 MG/1
2 TABLET ORAL EVERY 4 HOURS PRN
Qty: 18 TABLET | Refills: 0 | Status: SHIPPED | OUTPATIENT
Start: 2018-03-08 | End: 2018-03-12
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
## 2018-03-08 NOTE — ED NOTES
RN to see add on pain med script. RN to walk script out to patient in lobby. Pt denies wanting med filled tonight and took script home.

## 2018-03-08 NOTE — MR AVS SNAPSHOT
MRN:[id number]                      After Visit Summary   3/8/2018    Bia Bill    MRN: [id number]           Visit Information        Provider Department      3/8/2018 2:00 PM Kd Morris [facility]      Your next 10 appointments already scheduled     Mar 16, 2018  1:30 PM CDT   LAB with [facility] ([facility])    [address] [address]   [phone]           Please do not eat 10-12 hours before your appointment if you are coming in fasting for labs on lipids, cholesterol, or glucose (sugar). This does not apply to pregnant women. Water, hot tea and black coffee (with nothing added) are okay. Do not drink other fluids, diet soda or chew gum.            Mar 16, 2018  1:35 PM CDT   LAB with [facility] ([facility])    [address] [address]   [phone]           Please do not eat 10-12 hours before your appointment if you are coming in fasting for labs on lipids, cholesterol, or glucose (sugar). This does not apply to pregnant women. Water, hot tea and black coffee (with nothing added) are okay. Do not drink other fluids, diet soda or chew gum.            Mar 22, 2018 10:30 AM CDT   Return Visit with Kd Morris   [facility] ([facility])    [address] [address]   [phone]            Mar 23, 2018  1:00 PM CDT   Return Visit with Joshua Watts MD   [facility] ([facility])    [facility]  [address]  [facility] [address]   [phone]            Apr 05, 2018  3:00 PM CDT   Return Visit with Kd Morris   [facility] ([facility])    [address] [address]   [phone]            Apr 06, 2018  1:30 PM CDT   LAB with [facility]  "([facility])    [address]  [facility] [address]   [phone]           Please do not eat 10-12 hours before your appointment if you are coming in fasting for labs on lipids, cholesterol, or glucose (sugar). This does not apply to pregnant women. Water, hot tea and black coffee (with nothing added) are okay. Do not drink other fluids, diet soda or chew gum.              [facility] [employer] Information     Olo lets you send messages to your doctor, view your test results, renew your prescriptions, schedule appointments and more. To sign up, go to [email or web address] . Click on \"Log in\" on the left side of the screen, which will take you to the Welcome page. Then click on \"Sign up Now\" on the right side of the page.     You will be asked to enter the access code listed below, as well as some personal information. Please follow the directions to create your username and password.     Your access code is: [email or web address]  Expires: 2018  1:16 PM     Your access code will  in 90 days. If you need help or a new code, please call your [facility] clinic or [phone].        Care EveryWhere ID     This is your Care EveryWhere ID. This could be used by other organizations to access your [facility] medical records  [id number]        Equal Access to Services     ANTIONETTE IRBY AH: Hadii jose Clark, waaxda luqadaha, qaybta kaalnitin aguilar. So [facility] [phone].    ATENCIÓN: Si habla español, tiene a terry disposición servicios gratuitos de asistencia lingüística. Natalie al [phone].    We comply with applicable federal civil rights laws and [place] laws. We do not discriminate on the basis of race, color, national origin, age, disability, sex, sexual orientation, or gender identity.            "

## 2018-03-08 NOTE — TELEPHONE ENCOUNTER
"Patient states lower right abdominal pain has been present all day, is constant, doesn't radiate, rates pain 9 out of 10.  Advised to go to ED; patient states she will have to call around to find a ride to [place] ED; FNA suggested 911 if unable to find ride.  Reason for Disposition    [1] SEVERE pain (e.g., excruciating) AND [2] present > 1 hour    Additional Information    Negative: Shock suspected (e.g., cold/pale/clammy skin, too weak to stand, low BP, rapid pulse)    Negative: Difficult to awaken or acting confused  (e.g., disoriented, slurred speech)    Negative: Passed out (i.e., lost consciousness, collapsed and was not responding)    Negative: Sounds like a life-threatening emergency to the triager    Negative: Chest pain    Negative: Pain is mainly in upper abdomen  (if needed ask: \"is it mainly above the belly button?\")    Negative: Followed an abdomen (stomach) injury    Negative: [1] Abdominal pain AND [2] pregnant < 20 weeks    Negative: [1] Abdominal pain AND [2] pregnant > 20 weeks    Negative: [1] Abdominal pain AND [2] postpartum < 1 month since delivery    Protocols used: ABDOMINAL PAIN - FEMALE-ADULT-    "

## 2018-03-10 ASSESSMENT — ENCOUNTER SYMPTOMS
FEVER: 0
BRUISES/BLEEDS EASILY: 0
WEAKNESS: 0
LIGHT-HEADEDNESS: 0
CHILLS: 0
SHORTNESS OF BREATH: 1
APPETITE CHANGE: 1
WHEEZING: 1
DIZZINESS: 0
CHEST TIGHTNESS: 0
ACTIVITY CHANGE: 1
HEADACHES: 0

## 2018-03-12 NOTE — PROGRESS NOTES
Progress Note  Disclaimer: This note consists of symbols derived from keyboarding, dictation and/or voice recognition software. As a result, there may be errors in the script that have gone undetected. Please consider this when interpreting information found in this chart.    Client Name: Bia Bill  Date: 3/8/2018         Service Type: Individual      Session Start Time: 2:00 PM  Session End Time: 2:45 PM      Session Length: 45     Session #: 26     Attendees: Client attended alone    Treatment Plan Last Reviewed: 10/24/2017  PHQ-9 / CHIN-7 : 10     DATA   Client reported she Was in the ER last night for fluid on her lungs.  She has been prescribed diuretics and is gradually getting more comfortable.  She had a good week with her daughter last week.  We spent some time talking about focusing on good things, she was able to come up with visualizations of [place] as well as sewing.     Progress Since Last Session (Related to Symptoms / Goals / Homework):   Symptoms: Stable    Homework: Achieved / completed to satisfaction      Episode of Care Goals: Satisfactory progress - ACTION (Actively working towards change); Intervened by reinforcing change plan / affirming steps taken     Current / Ongoing Stressors and Concerns:   Recurrent depression  family relational problems, medical problems, chronic pain, trauma history      Treatment Objective(s) Addressed in This Session:   Increase interest, engagement, and pleasure in doing things  Decrease frequency and intensity of feeling down, depressed, hopeless       Intervention:   CBT: Behavioral activation worked on gratitude practice and positive visualization     ASSESSMENT: Current Emotional / Mental Status (status of significant symptoms):   Risk status (Self / Other harm or suicidal ideation)   Client denies current fears or concerns for personal safety.   Client denies current or recent suicidal ideation or  behaviors.   Client denies current or recent homicidal ideation or behaviors.   Client denies current or recent self injurious behavior or ideation.   Client denies other safety concerns.   A safety and risk management plan has not been developed at this time, however client was given the after-hours number / 911 should there be a change in any of these risk factors.     Appearance:   Appropriate    Eye Contact:   Good    Psychomotor Behavior: Normal    Attitude:   Cooperative    Orientation:   All   Speech    Rate / Production: Normal     Volume:  Normal    Mood:    Normal   Affect:    Appropriate    Thought Content:  Clear    Thought Form:  Coherent  Logical    Insight:    Good      Medication Review:   No changes to current psychiatric medication(s)     Medication Compliance:   Yes     Changes in Health Issues:   None reported     Chemical Use Review:   Substance Use: Chemical use reviewed, no active concerns identified      Tobacco Use: Client reports 5 cigarettes per day      Collateral Reports Completed:   Not Applicable    PLAN: (Client Tasks / Therapist Tasks / Other)  Client to continue With her self-care routine and maintenance of boundaries.   Maintain medication compliance and contact with psychiatry.  Take care of herself physically.  Kd Morris                                                         ________________________________________________________________________    Treatment Plan    Client's Name: Bia Bill  YOB: 1965    Date: 10/24/2017      DSM5 Diagnoses: (Sustained by DSM5 Criteria Listed Above)  Diagnoses: 296.40 Bipolar I Disorder Current or Most Recent Episode Manic, Unspecified  Psychosocial & Contextual Factors: financial difficulties, chronic pain, roommate issues  WHODAS 2.0 (12 item)               This questionnaire asks about difficulties due to health conditions. Health conditions             include disease or illnesses, other health problems that may  be short or long lasting,             injuries, mental health or emotional problems, and problems with alcohol or drugs.                         Think back over the past 30 days and answer these questions, thinking about how much             difficulty you had doing the following activities. For each question, please [place] only             one response.      S1  Standing for long periods such as 30 minutes?  Mild =           2    S2  Taking care of household responsibilities?  Moderate =   3    S3  Learning a new task, for example, learning how to get to a new place?  Mild =           2    S4  How much of a problem do you have joining community activities (for example, festivals, Zoroastrianism or other activities) in the same way as anyone else can?  Moderate =   3    S5  How much have you been emotionally affected by your health problems?  Mild =           2        In the past 30 days, how much difficulty did you have in:    S6  Concentrating on doing something for ten minutes?  Mild =           2    S7  Walking a long distance such as a kilometer (or equivalent)?  Severe =       4    S8  Washing your whole body?  None =         1    S9  Getting dressed?  None =         1    S10  Dealing with people you do not know?  Moderate =   3    S11  Maintaining a friendship?  Severe =       4    S12  Your day to day work?  Moderate =   3       H1  Overall, in the past 30 days, how many days were these difficulties present?  Record number of days seven    H2  In the past 30 days, for how many days were you totally unable to carry out your usual activities or work because of any health condition?  Record number of days  seven    H3  In the past 30 days, not counting the days that you were totally unable, for how many days did you cut back or reduce your usual activities or work because of any health condition?  Record number of days five                   Referral / Collaboration:  Referral to another professional/service is not  indicated at this time..    Anticipated number of session or this episode of care: 15    Goals  1. Education- the Biopsychosocial model of depression  a. Client will be able to describe how depression is effecting them physically, emotionally and socially  2. Behavioral Activation  a. Client will learn to assess their depression on a day to day basis  b. Client will Identify two forms of exercise/activity and engage in them 3 times per week  c. Client will Identify 3 things that make them laugh, and engage in these 5 times per week.  d. Client will Identify 1-2Creative activities or hobbies  and engage in them 2 times per week  e. Client will identify music, movies, books that make them feel good and use them 3-4 times per week  3. Self-care  a. Client will identify 5 things they can do just for themselves  b. Client will take time for quiet, reflection, meditation 5 times per week  c. Client will Learn to set boundaries when appropriate  d. Client will Identify 2 individuals they can call on for support, distraction  4. Assessment of progress  a. Client will engage in assessment of their progress on a regular basis    Bipolar Disorder  Treatment plan:  1. Education- the Biopsychosocial model of Bipolar Disorder    a. Client will be able to describe in general terms what Bipolar Disorder  is and is not  b. Client will be able to describe how Bipolar Disorder  has affected their life in at least two different areas, such as school or work and Home/relationships  c. Clients parents/guardians or significant others will be provided information on what Bipolar Disorder  is and the ways it can affect relationships and be encouraged to be a part of clients treatment team.  2. Medication  a. Client will participate in medication evaluation for Bipolar Disorder  symptoms and follow medication recommendations.   3. Identification and management of triggers  a. Client and therapist will examine patient s history to determine  if there are predictable triggers for manic or depressive episodes (e.g. boredom, anger, family stress)  b. Client and therapist will develop means of diffusing these triggers (e.g. relaxation strategies, boundary setting, anger management)  4. Comorbid conditions   a. Client and therapist will asses for comorbid conditions (e.g. anxiety, depression, substance use). and add additional items to treatment plan as needed  5. Self-care  a. Client will identify 3 things they can do just for themselves  b. Client will take time for quiet, reflection, meditation 3 times per week  c. Client will Learn to set boundaries when appropriate  d. Client will Identify 2 individuals they can call on for support, distraction  5. Behavioral Activation  a. Client will Identify two forms of exercise/activity and engage in them 3 times per week  b. Client will Identify 2 things that make them laugh, and engage in these 3 times per week.  c. Client will Identify 2 Creative activities or hobbies  and engage in them 2 times per week  d. Client will identify music, movies, books that make them feel good and use them 3 times per week  6. Assessment of progress  a. Client will engage in assessment of their progress on a regular basis    Client has reviewed and agreed to the above plan.      Kd Morris  10/24/2017 12/27/2017

## 2018-03-14 ENCOUNTER — OFFICE VISIT (OUTPATIENT)
Dept: FAMILY MEDICINE | Facility: CLINIC | Age: 53
End: 2018-03-14
Payer: COMMERCIAL

## 2018-03-14 ENCOUNTER — RADIANT APPOINTMENT (OUTPATIENT)
Dept: GENERAL RADIOLOGY | Facility: CLINIC | Age: 53
End: 2018-03-14
Attending: PHYSICIAN ASSISTANT
Payer: COMMERCIAL

## 2018-03-14 VITALS
SYSTOLIC BLOOD PRESSURE: 129 MMHG | BODY MASS INDEX: 47 KG/M2 | HEART RATE: 69 BPM | TEMPERATURE: 98.9 F | DIASTOLIC BLOOD PRESSURE: 71 MMHG | WEIGHT: 224.9 LBS | OXYGEN SATURATION: 95 %

## 2018-03-14 DIAGNOSIS — I82.592 CHRONIC DEEP VEIN THROMBOSIS (DVT) OF OTHER VEIN OF LEFT LOWER EXTREMITY (H): ICD-10-CM

## 2018-03-14 DIAGNOSIS — J45.30 MILD PERSISTENT ASTHMA WITHOUT COMPLICATION: Chronic | ICD-10-CM

## 2018-03-14 DIAGNOSIS — J44.9 CHRONIC OBSTRUCTIVE PULMONARY DISEASE, UNSPECIFIED COPD TYPE (H): Chronic | ICD-10-CM

## 2018-03-14 DIAGNOSIS — R09.81 NASAL CONGESTION: ICD-10-CM

## 2018-03-14 DIAGNOSIS — J44.9 CHRONIC OBSTRUCTIVE PULMONARY DISEASE, UNSPECIFIED COPD TYPE (H): Primary | Chronic | ICD-10-CM

## 2018-03-14 LAB
FLUAV+FLUBV AG SPEC QL: NEGATIVE
FLUAV+FLUBV AG SPEC QL: NEGATIVE
SPECIMEN SOURCE: NORMAL

## 2018-03-14 PROCEDURE — 36415 COLL VENOUS BLD VENIPUNCTURE: CPT | Performed by: PHYSICIAN ASSISTANT

## 2018-03-14 PROCEDURE — 87804 INFLUENZA ASSAY W/OPTIC: CPT | Performed by: PHYSICIAN ASSISTANT

## 2018-03-14 PROCEDURE — 71046 X-RAY EXAM CHEST 2 VIEWS: CPT | Mod: FY

## 2018-03-14 PROCEDURE — 80053 COMPREHEN METABOLIC PANEL: CPT | Performed by: PHYSICIAN ASSISTANT

## 2018-03-14 PROCEDURE — 99214 OFFICE O/P EST MOD 30 MIN: CPT | Performed by: PHYSICIAN ASSISTANT

## 2018-03-14 PROCEDURE — 85025 COMPLETE CBC W/AUTO DIFF WBC: CPT | Performed by: PHYSICIAN ASSISTANT

## 2018-03-14 RX ORDER — DOXYCYCLINE 100 MG/1
100 CAPSULE ORAL 2 TIMES DAILY
Qty: 20 CAPSULE | Refills: 0 | Status: SHIPPED | OUTPATIENT
Start: 2018-03-14 | End: 2018-03-24

## 2018-03-14 RX ORDER — PREDNISONE 20 MG/1
40 TABLET ORAL DAILY
Qty: 10 TABLET | Refills: 0 | Status: SHIPPED | OUTPATIENT
Start: 2018-03-14 | End: 2018-03-19

## 2018-03-14 RX ORDER — FLUTICASONE PROPIONATE 50 MCG
1-2 SPRAY, SUSPENSION (ML) NASAL DAILY
Qty: 1 BOTTLE | Refills: 11 | Status: SHIPPED | OUTPATIENT
Start: 2018-03-14 | End: 2018-05-16

## 2018-03-14 RX ORDER — IPRATROPIUM BROMIDE AND ALBUTEROL SULFATE 2.5; .5 MG/3ML; MG/3ML
1 SOLUTION RESPIRATORY (INHALATION) EVERY 6 HOURS PRN
Qty: 30 VIAL | Refills: 1 | Status: SHIPPED | OUTPATIENT
Start: 2018-03-14 | End: 2019-11-03

## 2018-03-14 NOTE — MR AVS SNAPSHOT
After Visit Summary   3/14/2018    Bia Bill    MRN: [id number]           Patient Information     Date Of Birth          1965        Visit Information        Provider Department      3/14/2018 3:00 PM Ayanna Hoff PA-C [facility]        Today's Diagnoses     Chronic obstructive pulmonary disease, unspecified COPD type (H)    -  1    Mild persistent asthma without complication        Chronic deep vein thrombosis (DVT) of other vein of left lower extremity (H)        Nasal congestion          Care Instructions    Have plenty of rest and fluids  Humidified air can be very helpful with cough  Use duoneb every 6 hours  Antibiotics  Prednisone steroids  Follow up if worsening symptoms or if not improving  Be seen urgently if worsening breathing           Follow-ups after your visit        Your next 10 appointments already scheduled     Mar 16, 2018  1:30 PM CDT   LAB with [facility] ([facility])    [address] [address]   [phone]           Please do not eat 10-12 hours before your appointment if you are coming in fasting for labs on lipids, cholesterol, or glucose (sugar). This does not apply to pregnant women. Water, hot tea and black coffee (with nothing added) are okay. Do not drink other fluids, diet soda or chew gum.            Mar 16, 2018  1:35 PM CDT   LAB with [facility] ([facility])    [address] [address]   [phone]           Please do not eat 10-12 hours before your appointment if you are coming in fasting for labs on lipids, cholesterol, or glucose (sugar). This does not apply to pregnant women. Water, hot tea and black coffee (with nothing added) are okay. Do not drink other fluids, diet soda or chew gum.            Mar 22, 2018 10:30 AM CDT   Return Visit with Kd Morris   [facility] ([facility])     "[address]  [facility] [address]   [phone]            Mar 23, 2018  1:00 PM CDT   Return Visit with Joshua Watts MD   [facility] ([facility])    [facility]  [address]  [facility] [address]   [phone]            Apr 05, 2018  3:00 PM CDT   Return Visit with Kd Morris   [facility] ([facility])    [address]  [facility] [address]   [phone]            Apr 06, 2018  1:30 PM CDT   LAB with [facility] ([facility])    [address] [facility] [address]   [phone]           Please do not eat 10-12 hours before your appointment if you are coming in fasting for labs on lipids, cholesterol, or glucose (sugar). This does not apply to pregnant women. Water, hot tea and black coffee (with nothing added) are okay. Do not drink other fluids, diet soda or chew gum.              Who to contact     Normal or non-critical lab and imaging results will be communicated to you by [employer], letter or phone within 4 business days after the clinic has received the results. If you do not hear from us within 7 days, please contact the clinic through [employer] or phone. If you have a critical or abnormal lab result, we will notify you by phone as soon as possible.  Submit refill requests through [employer] or call your pharmacy and they will forward the refill request to us. Please allow 3 business days for your refill to be completed.          If you need to speak with a  for additional information , please call: [phone]             Additional Information About Your Visit        [employer] Information     [employer] lets you send messages to your doctor, view your test results, renew your prescriptions, schedule appointments and more. To sign up, go to [email or web address] . Click on \"Log in\" on the left side of the screen, which will take " "you to the Welcome page. Then click on \"Sign up Now\" on the right side of the page.     You will be asked to enter the access code listed below, as well as some personal information. Please follow the directions to create your username and password.     Your access code is: [email or web address]  Expires: 2018  1:16 PM     Your access code will  in 90 days. If you need help or a new code, please call your [facility] clinic or [phone].        Care EveryWhere ID     This is your Care EveryWhere ID. This could be used by other organizations to access your [facility] medical records  [id number]        Your Vitals Were     Pulse Temperature Last Period Pulse Oximetry BMI (Body Mass Index)       69 98.9  F (37.2  C) (Tympanic) 2009 95% 47 kg/m2        Blood Pressure from Last 3 Encounters:   18 129/71   18 130/84   17 147/80    Weight from Last 3 Encounters:   18 224 lb 14.4 oz (102 kg)   17 221 lb (100.2 kg)   17 220 lb 3.2 oz (99.9 kg)              We Performed the Following     CBC with platelets differential     Comprehensive metabolic panel     Influenza A/B antigen     INR          Today's Medication Changes          These changes are accurate as of 3/14/18  3:52 PM.  If you have any questions, ask your nurse or doctor.               Start taking these medicines.        Dose/Directions    doxycycline monohydrate 100 MG capsule   Used for:  Chronic obstructive pulmonary disease, unspecified COPD type (H)   Started by:  Ayanna Hoff PA-C        Dose:  100 mg   Take 1 capsule (100 mg) by mouth 2 times daily for 10 days   Quantity:  20 capsule   Refills:  0       fluticasone 50 MCG/ACT spray   Commonly known as:  FLONASE   Used for:  Nasal congestion   Started by:  Ayanna Hoff PA-C        Dose:  1-2 spray   Spray 1-2 sprays into both nostrils daily   Quantity:  1 Bottle   Refills:  11       ipratropium - albuterol 0.5 mg/2.5 mg/3 mL 0.5-2.5 (3) MG/3ML neb " solution   Commonly known as:  DUONEB   Used for:  Chronic obstructive pulmonary disease, unspecified COPD type (H)   Started by:  Ayanna Hoff PA-C        Dose:  1 vial   Take 1 vial (3 mLs) by nebulization every 6 hours as needed for shortness of breath / dyspnea or wheezing   Quantity:  30 vial   Refills:  1       predniSONE 20 MG tablet   Commonly known as:  DELTASONE   Used for:  Chronic obstructive pulmonary disease, unspecified COPD type (H)   Started by:  Ayanna Hoff PA-C        Dose:  40 mg   Take 2 tablets (40 mg) by mouth daily for 5 days   Quantity:  10 tablet   Refills:  0            Where to get your medicines      These medications were sent to [id number] [place], [place] - [address]  [address] [address]    Hours:  Closed 10-16-08 business to [facility] Phone:  [phone]     doxycycline monohydrate 100 MG capsule    fluticasone 50 MCG/ACT spray    ipratropium - albuterol 0.5 mg/2.5 mg/3 mL 0.5-2.5 (3) MG/3ML neb solution    predniSONE 20 MG tablet                Primary Care Provider Office Phone # Fax #    Kd Leong [phone]       [facility] [address] [place] [address]        Equal Access to Services     ANTIONETTE IRBY AH: Hadii jose ku hadasho Soomaali, waaxda luqadaha, qaybta kaalmada adeegyada, waxmary ellen brizuelain hayandrewn jalil craig. So [facility] [phone].    ATENCIÓN: Si habla español, tiene a terry disposición servicios gratuitos de asistencia lingüística. Natalie al [phone].    We comply with applicable federal civil rights laws and [place] laws. We do not discriminate on the basis of race, color, national origin, age, disability, sex, sexual orientation, or gender identity.            Thank you!     Thank you for choosing [facility]  for your care. Our goal is always to provide you with excellent care. Hearing back from our patients is one way we can continue to improve our  services. Please take a few minutes to complete the written survey that you may receive in the mail after your visit with us. Thank you!             Your Updated Medication List - Protect others around you: Learn how to safely use, store and throw away your medicines at www.disposemymeds.org.          This list is accurate as of 3/14/18  3:52 PM.  Always use your most recent med list.                   Brand Name Dispense Instructions for use Diagnosis    acetaminophen 500 MG tablet    TYLENOL     Take 500-1,000 mg by mouth every 6 hours as needed for mild pain        * albuterol 108 (90 BASE) MCG/ACT Inhaler    PROAIR HFA/PROVENTIL HFA/VENTOLIN HFA    3 Inhaler    Inhale 2 puffs into the lungs every 6 hours as needed for shortness of breath / dyspnea or wheezing    Mild persistent asthma without complication       * albuterol (2.5 MG/3ML) 0.083% neb solution     1 Box    Take 1 vial (2.5 mg) by nebulization every 6 hours as needed for shortness of breath / dyspnea or wheezing    Mild persistent asthma without complication       allopurinol 100 MG tablet    ZYLOPRIM    90 tablet    TAKE ONE TABLET BY MOUTH ONE TIME DAILY    Acute gouty arthritis       ARIPiprazole 5 MG tablet    ABILIFY    180 tablet    Take 1.5 tablets (7.5 mg) by mouth At Bedtime    Major depressive disorder, recurrent episode, moderate (H)       buPROPion 150 MG 24 hr tablet    WELLBUTRIN XL    30 tablet    TAKE  ONE TABLET BY MOUTH EVERY DAY IN THE MORNING    COPD exacerbation (H)       calcium citrate-vitamin D 315-250 MG-UNIT Tabs per tablet    CITRACAL     Take 2 tablets by mouth daily        doxycycline monohydrate 100 MG capsule     20 capsule    Take 1 capsule (100 mg) by mouth 2 times daily for 10 days    Chronic obstructive pulmonary disease, unspecified COPD type (H)       EPINEPHrine 0.3 MG/0.3ML injection 2-pack    EPIPEN/ADRENACLICK/or ANY BX GENERIC EQUIV    0.6 mL    Inject 0.3 mLs (0.3 mg) into the muscle once as needed for  anaphylaxis    Anaphylactic reaction to bee sting, undetermined intent, subsequent encounter       fluticasone 50 MCG/ACT spray    FLONASE    1 Bottle    Spray 1-2 sprays into both nostrils daily    Nasal congestion       furosemide 20 MG tablet    LASIX    60 tablet    TAKE ONE TABLET BY MOUTH TWICE DAILY    Lymphedema of both lower extremities       gabapentin 600 MG tablet    NEURONTIN    90 tablet    TAKE ONE TABLET BY MOUTH THREE TIMES DAILY    Polyneuropathy in other diseases classified elsewhere (H)       ipratropium - albuterol 0.5 mg/2.5 mg/3 mL 0.5-2.5 (3) MG/3ML neb solution    DUONEB    30 vial    Take 1 vial (3 mLs) by nebulization every 6 hours as needed for shortness of breath / dyspnea or wheezing    Chronic obstructive pulmonary disease, unspecified COPD type (H)       lamoTRIgine 200 MG tablet    LaMICtal    90 tablet    Take 1 tablet (200 mg) by mouth daily    Major depressive disorder, recurrent episode, moderate (H)       levothyroxine 150 MCG tablet    SYNTHROID/LEVOTHROID    90 tablet    TAKE ONE TABLET BY MOUTH DAILY    Hypothyroidism, unspecified type       metoclopramide 10 MG tablet    REGLAN    40 tablet    Take 1 tablet (10 mg) by mouth 4 times daily as needed    Flatulence, eructation, and gas pain       mometasone-formoterol 100-5 MCG/ACT oral inhaler    DULERA    13 g    Inhale 2 puffs into the lungs 2 times daily    Mild persistent asthma without complication       omeprazole 20 MG CR capsule    priLOSEC    180 capsule    TAKE 1 CAPSULE (20 MG) BY MOUTH 2 TIMES DAILY    Gastroesophageal reflux disease without esophagitis       * order for DME      Equipment being ordered: CPAP AIRSENSE 10 5-18 CM H20 # [id number]   DN# [id number]        * order for DME     1 Month    Equipment being ordered: INCONTINENCE PADS  QID    Other urinary incontinence       * order for DME     60 Month    Equipment being ordered: DEPENDS SIZE LARGE    Mixed incontinence       * order for DME     2 Units     "Equipment being ordered: [id number]\" x2pair   \"20-30mmHg [facility] Hybrid Liners\" x 2 pair    Peripheral edema       predniSONE 20 MG tablet    DELTASONE    10 tablet    Take 2 tablets (40 mg) by mouth daily for 5 days    Chronic obstructive pulmonary disease, unspecified COPD type (H)       simvastatin 20 MG tablet    ZOCOR    90 tablet    TAKE ONE TABLET BY MOUTH AT BEDTIME    Hypercholesteremia       traZODone 150 MG tablet    DESYREL    30 tablet    TAKE ONE TABLET BY MOUTH AT BEDTIME    Major depressive disorder, recurrent episode, moderate (H)       warfarin 5 MG tablet    COUMADIN    180 tablet    12.5 mg on Wed and Sat 10 mg all other days    DVT (deep venous thrombosis) (H)       * Notice:  This list has 6 medication(s) that are the same as other medications prescribed for you. Read the directions carefully, and ask your doctor or other care provider to review them with you.      "

## 2018-03-14 NOTE — PATIENT INSTRUCTIONS
Have plenty of rest and fluids  Humidified air can be very helpful with cough  Use duoneb every 6 hours  Antibiotics  Prednisone steroids  Follow up if worsening symptoms or if not improving  Be seen urgently if worsening breathing

## 2018-03-14 NOTE — PROGRESS NOTES
SUBJECTIVE:                                                    Bia Bill is a 52 year old female who presents to clinic today for the following health issues:    ENT Symptoms             Symptoms: cc Present Absent Comment   Fever/Chills  x  Fever 101 yesterday, did not take temp today   Fatigue  x  Not able to sleep due to cough   Muscle Aches   x    Eye Irritation   x    Sneezing   x    Nasal Danny/Drg  x  Both   Sinus Pressure/Pain x x  Pressure and pain   Loss of smell   x    Dental pain   x    Sore Throat   x Irritation from coughing   Swollen Glands   x    Ear Pain/Fullness   x    Cough x x  Dry hacky, wheezy. At night more production   Wheeze  x     Chest Pain   x Chest tightness when coughing   Shortness of breath  x     Rash   x    Other  x       Symptom duration:  Friday 3/2/2018   Symptom severity:  moderate   Treatments tried:  Inhaler and nebs with no relief   Contacts:  Was recently in hospital 3/7/2018     She was seen 3/7/18 for RLQ abdominal pain - NEGATIVE CT abdomen/pelvis  CXR showed some pulmonary vascular congestion. duonebs helped in ED - more than her nebs at home  Albuterol helps then she gets worse again  Eating/drinking: pretty normal.   Abdominal pain is resolved    COPD: she takes dulera daily, most days doesn't take albuterol  But then she started taking dulera the past couple weeks only twice a week. Didn't think she needed it.    Coughing a lot at night  Lasix two times daily for a long time for lymphedema  Has toelrated steroids in the past for COPD     DVT 2004 left leg. Warfarin ever since    Problem list and histories reviewed & adjusted, as indicated.  Additional history: none    Patient Active Problem List   Diagnosis     Mild persistent asthma     Polyneuropathy in other diseases classified elsewhere (H)     DVT (deep venous thrombosis) (H)     Alcohol abuse, in remission     SECONDARY POLYCYTHEMIA     Chondromalacia of patella     Sensorineural hearing loss,  asymmetrical     Developmental reading disorder     Esophageal reflux     Hypothyroidism     Fatty liver     GILES (Obstructive Sleep Apnea)-Moderate (AHI 16)     RLS (restless legs syndrome)     Smoker     Erythrocytosis     Moderate mixed bipolar I disorder (H)     Personality disorder, depressive     COPD (chronic obstructive pulmonary disease) (H)     Current use of long term anticoagulation     Rosacea     Health Care Home     DDD (degenerative disc disease), cervical     Benign neoplasm of colon (POLYPOSIS)     Stroke (H)     Somatization disorder     Sebaceous cyst of right axilla     HTN, goal below 140/90     Left leg pain     Vitamin D deficiency     Long term current use of anticoagulant therapy     Morbid obesity (H)     PVD (peripheral vascular disease) with claudication (H)     Cyst of left ovary     Morbid obesity with alveolar hypoventilation (H)     GI bleed     Severe episode of recurrent major depressive disorder (H)     Past Surgical History:   Procedure Laterality Date     BILIARY STENT SINGLE USE COV      3 stints in left leg     BONE MARROW BIOPSY, BONE SPECIMEN, NEEDLE/TROCAR N/A 11/17/2014    Procedure: BIOPSY BONE MARROW;  Surgeon: Hay Aaron MD;  Location: [facility]     D & C  10/26/09    with uterine ablation     ESOPHAGOSCOPY, GASTROSCOPY, DUODENOSCOPY (EGD), COMBINED  4/21/2014    Procedure: Gastroscopy;  Surgeon: Moris Thomas MD;  Location: [facility]     HYSTERECTOMY, PAP NO LONGER INDICATED  1-4-2010     LITHOTRIPSY  2004    Lithotrypsy       Social History   Substance Use Topics     Smoking status: Current Every Day Smoker     Packs/day: 0.50     Years: 34.00     Types: Cigarettes     Smokeless tobacco: Never Used      Comment: started at age 11.  Quit during pregnancyX4.  0.5 ppd (6/2016)     Alcohol use No      Comment: quit 1995     Family History   Problem Relation Age of Onset     Hypertension Mother      DIABETES Mother      Blood Disease Mother      HEART  DISEASE Mother      chf     CEREBROVASCULAR DISEASE Mother      Hypertension Father      CANCER Father      lung cancer     Allergies Sister      Depression Sister      Hypertension Sister            ROS:  Other than noted above, general, HEENT, respiratory, cardiac, MS, and gastrointestinal systems are negative.     OBJECTIVE:                                                    /71 (BP Location: Right arm, Patient Position: Sitting, Cuff Size: Adult Regular)  Pulse 69  Temp 98.9  F (37.2  C) (Tympanic)  Wt 224 lb 14.4 oz (102 kg)  LMP 09/27/2009  SpO2 95%  BMI 47 kg/m2 Body mass index is 47 kg/(m^2).   GENERAL: healthy, alert, well nourished, well hydrated, no distress  HENT: ear canals- normal; TMs- normal; Nose- normal; Mouth- no ulcers, no lesions  NECK: no tenderness, no adenopathy, no asymmetry, no masses, no stiffness; thyroid- normal to palpation  RESP: POSITIVE scattered rhonchi, wheezes throughout  CV: regular rates and rhythm, normal S1 S2, no S3 or S4 and no murmur, no click or rub -  ABDOMEN: soft, no tenderness, no  hepatosplenomegaly, no masses, normal bowel sounds  MS: no deformities. No edema.    CXR - FINDINGS: Prominence of the pulmonary interstitium. No lobar consolidation. No pleural effusion or pneumothorax. Cardiomegaly is unchanged.   IMPRESSION: Mild vascular congestion.  I independently viewed the x-ray, discussed with patient, and am awaiting radiology read.     Patient well appearing, breathing easily in clinic, speaking in full sentences easily, no signs of cyanosis or respiratory distress.      ASSESSMENT/PLAN:                                                      ASSESSMENT/PLAN:      ICD-10-CM    1. Chronic obstructive pulmonary disease, unspecified COPD type (H) J44.9 XR Chest 2 Views     CBC with platelets differential     Comprehensive metabolic panel     Influenza A/B antigen     predniSONE (DELTASONE) 20 MG tablet     doxycycline monohydrate 100 MG capsule      ipratropium - albuterol 0.5 mg/2.5 mg/3 mL (DUONEB) 0.5-2.5 (3) MG/3ML neb solution   2. Mild persistent asthma without complication J45.30    3. Chronic deep vein thrombosis (DVT) of other vein of left lower extremity (H) I82.592 CANCELED: INR   4. Nasal congestion R09.81 fluticasone (FLONASE) 50 MCG/ACT spray     Has INR appointment in 2 days, I spoke to INR clinic    Patient Instructions   Have plenty of rest and fluids  Humidified air can be very helpful with cough  Use duoneb every 6 hours  Antibiotics  Prednisone steroids  Follow up if worsening symptoms or if not improving  Be seen urgently if worsening breathing      reports that she has been smoking Cigarettes.  She has a 17.00 pack-year smoking history. She has never used smokeless tobacco.  Tobacco Cessation Action Plan: she has cut down to 1 per day. planning to stop. trouble is her roommate smokes inside    Ayanna Hoff PA-C   [facility]

## 2018-03-15 ENCOUNTER — TELEPHONE (OUTPATIENT)
Dept: FAMILY MEDICINE | Facility: CLINIC | Age: 53
End: 2018-03-15

## 2018-03-15 DIAGNOSIS — I51.7 CARDIOMEGALY: Primary | ICD-10-CM

## 2018-03-15 LAB
ALBUMIN SERPL-MCNC: 3.3 G/DL (ref 3.4–5)
ALP SERPL-CCNC: 100 U/L (ref 40–150)
ALT SERPL W P-5'-P-CCNC: 27 U/L (ref 0–50)
ANION GAP SERPL CALCULATED.3IONS-SCNC: 8 MMOL/L (ref 3–14)
AST SERPL W P-5'-P-CCNC: 16 U/L (ref 0–45)
BILIRUB SERPL-MCNC: 0.4 MG/DL (ref 0.2–1.3)
BUN SERPL-MCNC: 10 MG/DL (ref 7–30)
CALCIUM SERPL-MCNC: 8.3 MG/DL (ref 8.5–10.1)
CHLORIDE SERPL-SCNC: 103 MMOL/L (ref 94–109)
CO2 SERPL-SCNC: 29 MMOL/L (ref 20–32)
CREAT SERPL-MCNC: 0.81 MG/DL (ref 0.52–1.04)
GFR SERPL CREATININE-BSD FRML MDRD: 75 ML/MIN/1.7M2
GLUCOSE SERPL-MCNC: 101 MG/DL (ref 70–99)
POTASSIUM SERPL-SCNC: 3.6 MMOL/L (ref 3.4–5.3)
PROT SERPL-MCNC: 6.2 G/DL (ref 6.8–8.8)
SODIUM SERPL-SCNC: 140 MMOL/L (ref 133–144)

## 2018-03-15 NOTE — TELEPHONE ENCOUNTER
Flu test is negative. Other labs look stable, but CBC still processing.  The radiologist read the x-ray as stable, with mild vascular congestion and heart enlargment. This is stable but with this and her symptoms, I recommend she have an echocardiogram. She can contact Cardiac Services  at [phone].  Ayanna Hoff PA-C

## 2018-03-16 ENCOUNTER — APPOINTMENT (OUTPATIENT)
Dept: LAB | Facility: CLINIC | Age: 53
End: 2018-03-16
Payer: COMMERCIAL

## 2018-03-16 ENCOUNTER — ANTICOAGULATION THERAPY VISIT (OUTPATIENT)
Dept: ANTICOAGULATION | Facility: CLINIC | Age: 53
End: 2018-03-16

## 2018-03-16 DIAGNOSIS — I73.9 PVD (PERIPHERAL VASCULAR DISEASE) WITH CLAUDICATION (H): ICD-10-CM

## 2018-03-16 DIAGNOSIS — Z79.01 LONG TERM CURRENT USE OF ANTICOAGULANT THERAPY: ICD-10-CM

## 2018-03-16 DIAGNOSIS — I82.409 DVT (DEEP VENOUS THROMBOSIS) (H): ICD-10-CM

## 2018-03-16 DIAGNOSIS — I73.9 PERIPHERAL VASCULAR DISEASE WITH CLAUDICATION (H): ICD-10-CM

## 2018-03-16 DIAGNOSIS — D75.1 ERYTHROCYTOSIS: Chronic | ICD-10-CM

## 2018-03-16 LAB
BASOPHILS # BLD AUTO: 0 10E9/L (ref 0–0.2)
BASOPHILS NFR BLD AUTO: 0.2 %
DIFFERENTIAL METHOD BLD: ABNORMAL
EOSINOPHIL # BLD AUTO: 0.1 10E9/L (ref 0–0.7)
EOSINOPHIL NFR BLD AUTO: 1.5 %
ERYTHROCYTE [DISTWIDTH] IN BLOOD BY AUTOMATED COUNT: 21.7 % (ref 10–15)
HCT VFR BLD AUTO: 57.2 % (ref 35–47)
HCT VFR BLD AUTO: 60 % (ref 35–47)
HGB BLD-MCNC: 18.2 G/DL (ref 11.7–15.7)
HGB BLD-MCNC: 19.3 G/DL (ref 11.7–15.7)
IMM GRANULOCYTES # BLD: 0 10E9/L (ref 0–0.4)
IMM GRANULOCYTES NFR BLD: 0.4 %
INR PPP: 2.86 (ref 0.86–1.14)
LYMPHOCYTES # BLD AUTO: 1.8 10E9/L (ref 0.8–5.3)
LYMPHOCYTES NFR BLD AUTO: 34.1 %
MCH RBC QN AUTO: 28.6 PG (ref 26.5–33)
MCHC RBC AUTO-ENTMCNC: 32.2 G/DL (ref 31.5–36.5)
MCV RBC AUTO: 89 FL (ref 78–100)
MONOCYTES # BLD AUTO: 0.6 10E9/L (ref 0–1.3)
MONOCYTES NFR BLD AUTO: 11 %
NEUTROPHILS # BLD AUTO: 2.8 10E9/L (ref 1.6–8.3)
NEUTROPHILS NFR BLD AUTO: 52.8 %
PLATELET # BLD AUTO: 156 10E9/L (ref 150–450)
RBC # BLD AUTO: 6.74 10E12/L (ref 3.8–5.2)
WBC # BLD AUTO: 5.4 10E9/L (ref 4–11)

## 2018-03-16 PROCEDURE — 85014 HEMATOCRIT: CPT | Performed by: FAMILY MEDICINE

## 2018-03-16 PROCEDURE — 99207 ZZC NO CHARGE NURSE ONLY: CPT

## 2018-03-16 PROCEDURE — 85018 HEMOGLOBIN: CPT | Performed by: FAMILY MEDICINE

## 2018-03-16 PROCEDURE — 36415 COLL VENOUS BLD VENIPUNCTURE: CPT | Performed by: FAMILY MEDICINE

## 2018-03-16 PROCEDURE — 85610 PROTHROMBIN TIME: CPT | Performed by: FAMILY MEDICINE

## 2018-03-16 NOTE — PROGRESS NOTES
ANTICOAGULATION FOLLOW-UP CLINIC VISIT    Patient Name:  Bia Bill  Date:  3/16/2018  Contact Type:  Telephone/ writer spoke with Norah on phone    SUBJECTIVE:     Patient Findings     Positives Antibiotic use or infection (3-14-18 doxycycline for 10 days and prednisone for 5 days for COPD flare up. )    Comments Patient states she has a URI and was advised by provider to check INR frequently so she would like to check next Friday. Lab appointment scheduled. She will take a slightly smaller dose on Saturday instead of her usual 12.5 mg that day she will take 10 mg. The doxy should not affect INR much so increase today is probably from the URI itself, which will improve with time. No other changes or concerns. Patient denies any bleeding or signs of clot.           OBJECTIVE    INR   Date Value Ref Range Status   03/16/2018 2.86 (H) 0.86 - 1.14 Final     Comment:     Special tube used to correct for high hematocrit       ASSESSMENT / PLAN  INR assessment SUPRA    Recheck INR In: 1 WEEK    INR Location Clinic lab     Anticoagulation Summary as of 3/16/2018     INR goal 2.0-2.5   Today's INR 2.86!   Maintenance plan 12.5 mg (5 mg x 2.5) on Wed, Sat; 10 mg (5 mg x 2) all other days   Full instructions 3/17: 10 mg; Otherwise 12.5 mg on Wed, Sat; 10 mg all other days   Weekly total 75 mg   Plan last modified Mireille Torres RN (12/27/2017)   Next INR check 3/23/2018   Priority INR   Target end date Indefinite    Indications   Long term current use of anticoagulant therapy [Z79.01]  DVT (deep venous thrombosis) (H) [I82.409]  PVD (peripheral vascular disease) with claudication (H) [I73.9]         Anticoagulation Episode Summary     INR check location     Preferred lab     Send INR reminders to [facility] POOL    Comments * lab draw due to elevated hematocrit (fingerstick meters don't work). LEFT LE. STENT x 3 LEFT UPPER LEG. Previous arterial clot. New goal range 2-2.5 starting 11/6/17.       Anticoagulation Care Providers     Provider Role Specialty Phone number    Kd Leong MD [facility] [phone]            See the Encounter Report to view Anticoagulation Flowsheet and Dosing Calendar (Go to Encounters tab in chart review, and find the Anticoagulation Therapy Visit)        Mireille Torres RN

## 2018-03-16 NOTE — MR AVS SNAPSHOT
Bia Bill   3/16/2018   Anticoagulation Therapy Visit    Description:  52 year old female   Provider:  Mireille Torres, RN   [facility]           INR as of 3/16/2018     Today's INR 2.86!      Anticoagulation Summary as of 3/16/2018     INR goal 2.0-2.5   Today's INR 2.86!   Full instructions 3/17: 10 mg; Otherwise 12.5 mg on Wed, Sat; 10 mg all other days   Next INR check 3/23/2018    Indications   Long term current use of anticoagulant therapy [Z79.01]  DVT (deep venous thrombosis) (H) [I82.409]  PVD (peripheral vascular disease) with claudication (H) [I73.9]         March 2018 Details    Sun Mon Tue Wed Thu Fri Sat         1               2               3                 4               5               6               7               8               9               10                 11               12               13               14               15               16      10 mg   See details      17      10 mg           18      10 mg         19      10 mg         20      10 mg         21      12.5 mg         22      10 mg         23            24                 25               26               27               28               29               30               31                Date Details   03/16 This INR check       Date of next INR:  3/23/2018         How to take your warfarin dose     To take:  10 mg Take 2 of the 5 mg tablets.    To take:  12.5 mg Take 2.5 of the 5 mg tablets.

## 2018-03-19 DIAGNOSIS — G63 POLYNEUROPATHY IN OTHER DISEASES CLASSIFIED ELSEWHERE (H): Chronic | ICD-10-CM

## 2018-03-19 RX ORDER — GABAPENTIN 600 MG/1
TABLET ORAL
Qty: 90 TABLET | Refills: 0 | Status: SHIPPED | OUTPATIENT
Start: 2018-03-19 | End: 2018-04-18

## 2018-03-19 NOTE — TELEPHONE ENCOUNTER
GABAPENTIN 600 MG TAB ASCE        Last Written Prescription Date:  2/19/18  Last Fill Quantity: 90,   # refills: 0  Last Office Visit: 3/14/18  Future Office visit:    Next 5 appointments (look out 90 days)     Mar 22, 2018 10:30 AM CDT   [facility]    [address] [address]   [phone]            Mar 23, 2018  1:00 PM CDT   Return Visit with Joshua Watts MD   [facility] ([facility])    [facility]  [address]  [facility] [address]   [phone]            Apr 05, 2018  3:00 PM CDT   Return Visit with Kd Morris   [facility] ([facility])    [address] [address]   [phone]                   Routing refill request to provider for review/approval because:  Drug not on the [facility], [facility] or [facility] refill protocol or controlled substance

## 2018-03-20 ENCOUNTER — HOSPITAL ENCOUNTER (OUTPATIENT)
Dept: CARDIOLOGY | Facility: CLINIC | Age: 53
Discharge: HOME | End: 2018-03-20
Attending: PHYSICIAN ASSISTANT | Admitting: PHYSICIAN ASSISTANT
Payer: COMMERCIAL

## 2018-03-20 DIAGNOSIS — I51.7 CARDIOMEGALY: ICD-10-CM

## 2018-03-20 PROCEDURE — 93306 TTE W/DOPPLER COMPLETE: CPT | Mod: 26 | Performed by: INTERNAL MEDICINE

## 2018-03-20 PROCEDURE — 25500064 ZZH RX 255 OP 636: Performed by: PHYSICIAN ASSISTANT

## 2018-03-20 PROCEDURE — 93306 TTE W/DOPPLER COMPLETE: CPT

## 2018-03-20 RX ADMIN — HUMAN ALBUMIN MICROSPHERES AND PERFLUTREN 2 ML: 10; .22 INJECTION, SOLUTION INTRAVENOUS at 13:45

## 2018-03-21 ENCOUNTER — TELEPHONE (OUTPATIENT)
Dept: FAMILY MEDICINE | Facility: CLINIC | Age: 53
End: 2018-03-21

## 2018-03-21 DIAGNOSIS — G47.33 OSA (OBSTRUCTIVE SLEEP APNEA): Primary | Chronic | ICD-10-CM

## 2018-03-21 DIAGNOSIS — R11.2 NAUSEA AND VOMITING, INTRACTABILITY OF VOMITING NOT SPECIFIED, UNSPECIFIED VOMITING TYPE: Primary | ICD-10-CM

## 2018-03-21 DIAGNOSIS — J44.9 CHRONIC OBSTRUCTIVE PULMONARY DISEASE, UNSPECIFIED COPD TYPE (H): Chronic | ICD-10-CM

## 2018-03-21 RX ORDER — ONDANSETRON 4 MG/1
4 TABLET, FILM COATED ORAL EVERY 8 HOURS PRN
Qty: 10 TABLET | Refills: 0 | Status: SHIPPED | OUTPATIENT
Start: 2018-03-21 | End: 2018-08-02

## 2018-03-21 NOTE — TELEPHONE ENCOUNTER
"Tawana: Patient notified of echo results. She is taking the doxycycline. Norah reports that she has had nausea and vomiting since 3/19/18. Has vomitted 4 times per day the past 2 days. \"whenever I eat it comes back up.\" Says she has been drinking lots of fluids and is keeping them down. Urinating OK. Reports pain underneath left breast when she coughs really hard. Denies abdominal pain. BM every other day. Denies chest pain. Has a hard time using her CPAP as it feels suffocating. Productive cough of yellow phlegm. Denies fever. Throat feels irritated. Ears feel fine. Sleeps in the bed with pillows propping or up or she sleeps on the couch; wakes up every couple of hours. Reports headaches.   Mundo Conde, RN    "

## 2018-03-21 NOTE — TELEPHONE ENCOUNTER
Message left on answering machine to call the [facility] RN back.  Mundo Conde RN

## 2018-03-21 NOTE — TELEPHONE ENCOUNTER
The left ventricle is mildly enlarged. This is likely what we are noticing on x-ray.   Her heart however is still functioning okay. We can continue to monitor this in the future.  How is she feeling?  Ayanna Hoff PA-C

## 2018-03-22 ENCOUNTER — HOSPITAL ENCOUNTER (EMERGENCY)
Facility: CLINIC | Age: 53
Discharge: HOME | End: 2018-03-22
Attending: EMERGENCY MEDICINE | Admitting: EMERGENCY MEDICINE
Payer: COMMERCIAL

## 2018-03-22 ENCOUNTER — OFFICE VISIT (OUTPATIENT)
Dept: PSYCHOLOGY | Facility: CLINIC | Age: 53
End: 2018-03-22
Payer: COMMERCIAL

## 2018-03-22 ENCOUNTER — APPOINTMENT (OUTPATIENT)
Dept: GENERAL RADIOLOGY | Facility: CLINIC | Age: 53
End: 2018-03-22
Attending: EMERGENCY MEDICINE
Payer: COMMERCIAL

## 2018-03-22 VITALS
DIASTOLIC BLOOD PRESSURE: 72 MMHG | RESPIRATION RATE: 13 BRPM | HEART RATE: 84 BPM | TEMPERATURE: 98 F | OXYGEN SATURATION: 94 % | BODY MASS INDEX: 47 KG/M2 | SYSTOLIC BLOOD PRESSURE: 124 MMHG | WEIGHT: 224.87 LBS

## 2018-03-22 DIAGNOSIS — F31.62 MODERATE MIXED BIPOLAR I DISORDER (H): Primary | ICD-10-CM

## 2018-03-22 DIAGNOSIS — J44.1 COPD EXACERBATION (H): ICD-10-CM

## 2018-03-22 LAB
ANION GAP SERPL CALCULATED.3IONS-SCNC: 6 MMOL/L (ref 3–14)
BASOPHILS # BLD AUTO: 0 10E9/L (ref 0–0.2)
BASOPHILS NFR BLD AUTO: 0.3 %
BUN SERPL-MCNC: 13 MG/DL (ref 7–30)
CALCIUM SERPL-MCNC: 9.1 MG/DL (ref 8.5–10.1)
CHLORIDE SERPL-SCNC: 101 MMOL/L (ref 94–109)
CO2 SERPL-SCNC: 31 MMOL/L (ref 20–32)
CREAT SERPL-MCNC: 0.9 MG/DL (ref 0.52–1.04)
DIFFERENTIAL METHOD BLD: ABNORMAL
EOSINOPHIL # BLD AUTO: 0.2 10E9/L (ref 0–0.7)
EOSINOPHIL NFR BLD AUTO: 1.6 %
ERYTHROCYTE [DISTWIDTH] IN BLOOD BY AUTOMATED COUNT: 19.7 % (ref 10–15)
GFR SERPL CREATININE-BSD FRML MDRD: 66 ML/MIN/1.7M2
GLUCOSE SERPL-MCNC: 96 MG/DL (ref 70–99)
HCT VFR BLD AUTO: 66.2 % (ref 35–47)
HGB BLD-MCNC: 20.6 G/DL (ref 11.7–15.7)
IMM GRANULOCYTES # BLD: 0 10E9/L (ref 0–0.4)
IMM GRANULOCYTES NFR BLD: 0 %
INR PPP: NORMAL (ref 0.86–1.14)
LYMPHOCYTES # BLD AUTO: 2 10E9/L (ref 0.8–5.3)
LYMPHOCYTES NFR BLD AUTO: 27.2 %
MCH RBC QN AUTO: 28.9 PG (ref 26.5–33)
MCHC RBC AUTO-ENTMCNC: 31.1 G/DL (ref 31.5–36.5)
MCV RBC AUTO: 93 FL (ref 78–100)
MONOCYTES # BLD AUTO: 0.6 10E9/L (ref 0–1.3)
MONOCYTES NFR BLD AUTO: 8.7 %
NEUTROPHILS # BLD AUTO: 4.8 10E9/L (ref 1.6–8.3)
NEUTROPHILS NFR BLD AUTO: 62.2 %
NT-PROBNP SERPL-MCNC: 12 PG/ML (ref 0–900)
PLATELET # BLD AUTO: 146 10E9/L (ref 150–450)
POTASSIUM SERPL-SCNC: 4.1 MMOL/L (ref 3.4–5.3)
RBC # BLD AUTO: 7.12 10E12/L (ref 3.8–5.2)
SODIUM SERPL-SCNC: 138 MMOL/L (ref 133–144)
TROPONIN I SERPL-MCNC: <0.015 UG/L (ref 0–0.04)
TROPONIN I SERPL-MCNC: <0.015 UG/L (ref 0–0.04)
WBC # BLD AUTO: 7.6 10E9/L (ref 4–11)

## 2018-03-22 PROCEDURE — 80048 BASIC METABOLIC PNL TOTAL CA: CPT | Performed by: EMERGENCY MEDICINE

## 2018-03-22 PROCEDURE — 25000132 ZZH RX MED GY IP 250 OP 250 PS 637: Performed by: EMERGENCY MEDICINE

## 2018-03-22 PROCEDURE — 99285 EMERGENCY DEPT VISIT HI MDM: CPT | Mod: 25 | Performed by: EMERGENCY MEDICINE

## 2018-03-22 PROCEDURE — 71046 X-RAY EXAM CHEST 2 VIEWS: CPT

## 2018-03-22 PROCEDURE — 84484 ASSAY OF TROPONIN QUANT: CPT | Performed by: EMERGENCY MEDICINE

## 2018-03-22 PROCEDURE — 93010 ELECTROCARDIOGRAM REPORT: CPT | Mod: Z6 | Performed by: EMERGENCY MEDICINE

## 2018-03-22 PROCEDURE — 85025 COMPLETE CBC W/AUTO DIFF WBC: CPT | Performed by: EMERGENCY MEDICINE

## 2018-03-22 PROCEDURE — 83880 ASSAY OF NATRIURETIC PEPTIDE: CPT | Performed by: EMERGENCY MEDICINE

## 2018-03-22 PROCEDURE — 90834 PSYTX W PT 45 MINUTES: CPT | Performed by: PSYCHOLOGIST

## 2018-03-22 PROCEDURE — 85610 PROTHROMBIN TIME: CPT | Performed by: EMERGENCY MEDICINE

## 2018-03-22 PROCEDURE — 25000125 ZZHC RX 250: Performed by: EMERGENCY MEDICINE

## 2018-03-22 PROCEDURE — 93005 ELECTROCARDIOGRAM TRACING: CPT | Performed by: EMERGENCY MEDICINE

## 2018-03-22 RX ORDER — NITROGLYCERIN 0.4 MG/1
0.4 TABLET SUBLINGUAL EVERY 5 MIN PRN
Status: DISCONTINUED | OUTPATIENT
Start: 2018-03-22 | End: 2018-03-22 | Stop reason: HOSPADM

## 2018-03-22 RX ORDER — PREDNISONE 20 MG/1
TABLET ORAL
Qty: 18 TABLET | Refills: 0 | Status: SHIPPED | OUTPATIENT
Start: 2018-03-22 | End: 2018-03-30

## 2018-03-22 RX ADMIN — NITROGLYCERIN 0.4 MG: 0.4 TABLET SUBLINGUAL at 14:44

## 2018-03-22 RX ADMIN — NITROGLYCERIN 0.4 MG: 0.4 TABLET SUBLINGUAL at 14:32

## 2018-03-22 RX ADMIN — LIDOCAINE HYDROCHLORIDE 30 ML: 20 SOLUTION ORAL; TOPICAL at 13:55

## 2018-03-22 NOTE — TELEPHONE ENCOUNTER
Refilled a prednisone taper. Follow up next week if symptoms not improving. Be seen sooner if any symptoms worsen.  Ayanna Hoff PA-C

## 2018-03-22 NOTE — ED NOTES
URI sx started 3 weeks ago. Seen at clinic started on meds. Vomiting started on Monday. Last emesis 11 am today. Chest pain also started on Monday. Feels cough has worsened despite meds. Feels SOA at rest. Increase with exertion. No fever. Epigastric pain. No diarrhea.

## 2018-03-22 NOTE — ED AVS SNAPSHOT
[facility] Emergency Department    [address] [address]    Phone:  [phone]    Fax:  [phone]                                       Bia Bill   MRN: [id number]    Department:  [facility] Emergency Department   Date of Visit:  3/22/2018           After Visit Summary Signature Page     I have received my discharge instructions, and my questions have been answered. I have discussed any challenges I see with this plan with the nurse or doctor.    ..........................................................................................................................................  Patient/Patient Representative Signature      ..........................................................................................................................................  Patient Representative Print Name and Relationship to Patient    ..................................................               ................................................  Date                                            Time    ..........................................................................................................................................  Reviewed by Signature/Title    ...................................................              ..............................................  Date                                                            Time

## 2018-03-22 NOTE — ED NOTES
Lab called regarding critical hematocrit.   Lab will come down to redraw labs.   Provider updated.

## 2018-03-22 NOTE — TELEPHONE ENCOUNTER
Patient left a message. She's currently at [facility].    Zina [place]   Clinic Station

## 2018-03-22 NOTE — TELEPHONE ENCOUNTER
Patient notified of recommendations. She is taking the dulera and duonebs as prescribed. She said that the prednisone helped a little, but after stopping the medication, she felt worse. She would like to try another round of prednisone. Please adise. Thank you.  Aubree Richardson RN

## 2018-03-22 NOTE — ED AVS SNAPSHOT
[facility] Emergency Department    [address] [address]    Phone:  [phone]    Fax:  [phone]                                       Bia Bill   MRN: [id number]    Department:  [facility] Emergency Department   Date of Visit:  3/22/2018           Patient Information     Date Of Birth          1965        Your diagnoses for this visit were:     COPD exacerbation (H)        You were seen by Yosef Cassidy MD.        Discharge Instructions        your prescription for prednisone at the [facility] pharmacy in [place]. Return to the ED if you have increased pain, worsening symptoms, repeated vomiting, or other concerns. Otherwise follow up in clinic for a recheck next week.    Your next 10 appointments already scheduled     Mar 23, 2018 12:45 PM CDT   LAB with [facility] ([facility])    [address] [address]   [phone]           Please do not eat 10-12 hours before your appointment if you are coming in fasting for labs on lipids, cholesterol, or glucose (sugar). This does not apply to pregnant women. Water, hot tea and black coffee (with nothing added) are okay. Do not drink other fluids, diet soda or chew gum.            Mar 23, 2018  1:00 PM CDT   Return Visit with Joshua Watts MD   [facility] ([facility])    [facility]  [address]  [facility] [address]   [phone]            Apr 03, 2018  1:00 PM CDT   Return Visit with Kd Morris   [facility]    [address] [address]   [phone]            Apr 06, 2018  1:30 PM CDT   LAB with [facility] ([facility])    [address] [address]   [phone]           Please do not eat 10-12 hours before your appointment if you are coming in fasting for  labs on lipids, cholesterol, or glucose (sugar). This does not apply to pregnant women. Water, hot tea and black coffee (with nothing added) are okay. Do not drink other fluids, diet soda or chew gum.            Apr 19, 2018  9:30 AM CDT   Return Visit with Kd Morris   [facility] ([facility])    [address] [address] [phone]              24 Hour Appointment Hotline       To make an appointment at any [facility] clinic, call [phone] ([phone]). If you don't have a family doctor or clinic, we will help you find one. [facility] are conveniently located to serve the needs of you and your family.             Review of your medicines      START taking        Dose / Directions Last dose taken    predniSONE 20 MG tablet   Commonly known as:  DELTASONE   Quantity:  18 tablet        Take 3 tabs (60 mg) by mouth daily x 3 days, 2 tabs (40 mg) daily x 3 days, 1 tab (20 mg) daily x 3   Refills:  0          Our records show that you are taking the medicines listed below. If these are incorrect, please call your family doctor or clinic.        Dose / Directions Last dose taken    acetaminophen 500 MG tablet   Commonly known as:  TYLENOL   Dose:  500-1000 mg        Take 500-1,000 mg by mouth every 6 hours as needed for mild pain   Refills:  0        * albuterol 108 (90 BASE) MCG/ACT Inhaler   Commonly known as:  PROAIR HFA/PROVENTIL HFA/VENTOLIN HFA   Dose:  2 puff   Quantity:  3 Inhaler        Inhale 2 puffs into the lungs every 6 hours as needed for shortness of breath / dyspnea or wheezing   Refills:  1        * albuterol (2.5 MG/3ML) 0.083% neb solution   Dose:  1 vial   Quantity:  1 Box        Take 1 vial (2.5 mg) by nebulization every 6 hours as needed for shortness of breath / dyspnea or wheezing   Refills:  0        allopurinol 100 MG tablet   Commonly known as:  ZYLOPRIM   Quantity:  90 tablet        TAKE ONE TABLET BY MOUTH ONE TIME DAILY    Refills:  2        ARIPiprazole 5 MG tablet   Commonly known as:  ABILIFY   Dose:  7.5 mg   Quantity:  180 tablet        Take 1.5 tablets (7.5 mg) by mouth At Bedtime   Refills:  3        buPROPion 150 MG 24 hr tablet   Commonly known as:  WELLBUTRIN XL   Quantity:  30 tablet        TAKE  ONE TABLET BY MOUTH EVERY DAY IN THE MORNING   Refills:  0        calcium citrate-vitamin D 315-250 MG-UNIT Tabs per tablet   Commonly known as:  CITRACAL   Dose:  2 tablet        Take 2 tablets by mouth daily   Refills:  0        doxycycline monohydrate 100 MG capsule   Dose:  100 mg   Quantity:  20 capsule        Take 1 capsule (100 mg) by mouth 2 times daily for 10 days   Refills:  0        EPINEPHrine 0.3 MG/0.3ML injection 2-pack   Commonly known as:  EPIPEN/ADRENACLICK/or ANY BX GENERIC EQUIV   Dose:  0.3 mg   Quantity:  0.6 mL        Inject 0.3 mLs (0.3 mg) into the muscle once as needed for anaphylaxis   Refills:  0        fluticasone 50 MCG/ACT spray   Commonly known as:  FLONASE   Dose:  1-2 spray   Quantity:  1 Bottle        Spray 1-2 sprays into both nostrils daily   Refills:  11        furosemide 20 MG tablet   Commonly known as:  LASIX   Quantity:  60 tablet        TAKE ONE TABLET BY MOUTH TWICE DAILY   Refills:  0        gabapentin 600 MG tablet   Commonly known as:  NEURONTIN   Quantity:  90 tablet        TAKE ONE TABLET BY MOUTH THREE TIMES DAILY   Refills:  0        ipratropium - albuterol 0.5 mg/2.5 mg/3 mL 0.5-2.5 (3) MG/3ML neb solution   Commonly known as:  DUONEB   Dose:  1 vial   Quantity:  30 vial        Take 1 vial (3 mLs) by nebulization every 6 hours as needed for shortness of breath / dyspnea or wheezing   Refills:  1        lamoTRIgine 200 MG tablet   Commonly known as:  LaMICtal   Dose:  200 mg   Quantity:  90 tablet        Take 1 tablet (200 mg) by mouth daily   Refills:  3        levothyroxine 150 MCG tablet   Commonly known as:  SYNTHROID/LEVOTHROID   Quantity:  90 tablet        TAKE ONE TABLET  "BY MOUTH DAILY   Refills:  2        metoclopramide 10 MG tablet   Commonly known as:  REGLAN   Dose:  10 mg   Quantity:  40 tablet        Take 1 tablet (10 mg) by mouth 4 times daily as needed   Refills:  0        mometasone-formoterol 100-5 MCG/ACT oral inhaler   Commonly known as:  DULERA   Dose:  2 puff   Quantity:  13 g        Inhale 2 puffs into the lungs 2 times daily   Refills:  11        omeprazole 20 MG CR capsule   Commonly known as:  priLOSEC   Quantity:  180 capsule        TAKE 1 CAPSULE (20 MG) BY MOUTH 2 TIMES DAILY   Refills:  1        ondansetron 4 MG tablet   Commonly known as:  ZOFRAN   Dose:  4 mg   Quantity:  10 tablet        Take 1 tablet (4 mg) by mouth every 8 hours as needed for nausea   Refills:  0        * order for DME        Equipment being ordered: CPAP AIRSENSE 10 5-18 CM H20 # [id number]   DN# [id number]   Refills:  0        * order for DME   Quantity:  1 Month        Equipment being ordered: INCONTINENCE PADS  QID   Refills:  11        * order for DME   Quantity:  60 Month        Equipment being ordered: DEPENDS SIZE LARGE   Refills:  5        * order for DME   Quantity:  2 Units        Equipment being ordered: [id number]\" x2pair   \"20-30mmHg [facility] Hybrid Liners\" x 2 pair   Refills:  11        simvastatin 20 MG tablet   Commonly known as:  ZOCOR   Quantity:  90 tablet        TAKE ONE TABLET BY MOUTH AT BEDTIME   Refills:  3        traZODone 150 MG tablet   Commonly known as:  DESYREL   Quantity:  30 tablet        TAKE ONE TABLET BY MOUTH AT BEDTIME   Refills:  0        warfarin 5 MG tablet   Commonly known as:  COUMADIN   Quantity:  180 tablet        12.5 mg on Wed and Sat 10 mg all other days   Refills:  2        * Notice:  This list has 6 medication(s) that are the same as other medications prescribed for you. Read the directions carefully, and ask your doctor or other care provider to review them with you.            Procedures and tests performed during your visit     " Procedure/Test Number of Times Performed    Basic metabolic panel 1    CBC with platelets, differential 1    Chest XR,  PA & LAT 1    EKG 12 lead 1    INR 2    Nt probnp inpatient 1    Troponin I 2      Orders Needing Specimen Collection     None      Pending Results     No orders found from 3/20/2018 to 3/23/2018.            Pending Culture Results     No orders found from 3/20/2018 to 3/23/2018.            Pending Results Instructions     If you had any lab results that were not finalized at the time of your Discharge, you can call the ED Lab Result RN at [phone]. You will be contacted by this team for any positive Lab results or changes in treatment. The nurses are available 7 days a week from 10A to 6:30P.  You can leave a message 24 hours per day and they will return your call.        Test Results From Your Hospital Stay        3/22/2018  1:55 PM      Component Results     Component Value Ref Range & Units Status    WBC 7.6 4.0 - 11.0 10e9/L Final    RBC Count 7.12 (H) 3.8 - 5.2 10e12/L Final    Hemoglobin 20.6 (H) 11.7 - 15.7 g/dL Final    Hematocrit 66.2 (H) 35.0 - 47.0 % Final    MCV 93 78 - 100 fl Final    MCH 28.9 26.5 - 33.0 pg Final    MCHC 31.1 (L) 31.5 - 36.5 g/dL Final    RDW 19.7 (H) 10.0 - 15.0 % Final    Platelet Count 146 (L) 150 - 450 10e9/L Final    Diff Method Automated Method  Final    % Neutrophils 62.2 % Final    % Lymphocytes 27.2 % Final    % Monocytes 8.7 % Final    % Eosinophils 1.6 % Final    % Basophils 0.3 % Final    % Immature Granulocytes 0.0 % Final    Absolute Neutrophil 4.8 1.6 - 8.3 10e9/L Final    Absolute Lymphocytes 2.0 0.8 - 5.3 10e9/L Final    Absolute Monocytes 0.6 0.0 - 1.3 10e9/L Final    Absolute Eosinophils 0.2 0.0 - 0.7 10e9/L Final    Absolute Basophils 0.0 0.0 - 0.2 10e9/L Final    Abs Immature Granulocytes 0.0 0 - 0.4 10e9/L Final         3/22/2018  1:12 PM      Component Results     Component Value Ref Range & Units Status    Sodium 138 133 - 144 mmol/L Final     Potassium 4.1 3.4 - 5.3 mmol/L Final    Chloride 101 94 - 109 mmol/L Final    Carbon Dioxide 31 20 - 32 mmol/L Final    Anion Gap 6 3 - 14 mmol/L Final    Glucose 96 70 - 99 mg/dL Final    Urea Nitrogen 13 7 - 30 mg/dL Final    Creatinine 0.90 0.52 - 1.04 mg/dL Final    GFR Estimate 66 >60 mL/min/1.7m2 Final    Non  GFR Calc    GFR Estimate If Black 79 >60 mL/min/1.7m2 Final    African American GFR Calc    Calcium 9.1 8.5 - 10.1 mg/dL Final         3/22/2018  1:12 PM      Component Results     Component Value Ref Range & Units Status    Troponin I ES <0.015 0.000 - 0.045 ug/L Final    The 99th percentile for upper reference range is 0.045 ug/L.  Troponin values   in the range of 0.045 - 0.120 ug/L may be associated with risks of adverse   clinical events.           3/22/2018  1:27 PM      Component Results     Component Value Ref Range & Units Status    INR Canceled, Test credited 0.86 - 1.14 Corrected    DISREGARD RESULTS DUE TO HIGH HEMATOCRIT  CORRECTED ON 03/22 AT 1326: PREVIOUSLY REPORTED AS 3.37           3/22/2018  2:05 PM      Narrative     XR CHEST 2 VW 3/22/2018 1:17 PM    HISTORY: Short of breath.    COMPARISON: Several prior studies, the most recent one being dated  3/14/2018.        Impression     IMPRESSION: 2 views of the chest show stable mild cardiomegaly.  Pulmonary vascular congestion is again seen but there is no karine CHF.  There is no evidence of pneumonia.    KAMLA MUSA MD         3/22/2018  1:52 PM      Component Results     Component Value Ref Range & Units Status    N-Terminal Pro BNP Inpatient 12 0 - 900 pg/mL Final       Reference range shown and results flagged as abnormal are suggested inpatient   cut points for confirming diagnosis if CHF in an acute setting. Establishing a   baseline value for each individual patient is useful for follow-up. An   inpatient or emergency department NT-proPBNP <300 pg/mL effectively rules out   acute CHF, with 99% negative predictive  "value.  The outpatient non-acute reference range for ruling out CHF is:   0-125 pg/mL (age 18 to less than 75)   0-450 pg/mL (age 75 yrs and older)           3/22/2018  1:53 PM      Component Results     Component Value Ref Range & Units Status    INR 3.36 (H) 0.86 - 1.14 Final         3/22/2018  2:55 PM      Component Results     Component Value Ref Range & Units Status    Troponin I ES <0.015 0.000 - 0.045 ug/L Final    The 99th percentile for upper reference range is 0.045 ug/L.  Troponin values   in the range of 0.045 - 0.120 ug/L may be associated with risks of adverse   clinical events.                  Thank you for choosing [facility]       Thank you for choosing [facility] for your care. Our goal is always to provide you with excellent care. Hearing back from our patients is one way we can continue to improve our services. Please take a few minutes to complete the written survey that you may receive in the mail after you visit with us. Thank you!        [employer] Information     [employer] lets you send messages to your doctor, view your test results, renew your prescriptions, schedule appointments and more. To sign up, go to [email or web address] . Click on \"Log in\" on the left side of the screen, which will take you to the Welcome page. Then click on \"Sign up Now\" on the right side of the page.     You will be asked to enter the access code listed below, as well as some personal information. Please follow the directions to create your username and password.     Your access code is: [email or web address]  Expires: 2018  1:16 PM     Your access code will  in 90 days. If you need help or a new code, please call your [facility] clinic or [phone].        Care EveryWhere ID     This is your Care EveryWhere ID. This could be used by other organizations to access your [facility] medical records  [id number]        Equal Access to Services     ANTIONETTE IRBY AH: Elfego Clark, waldemar rust, emma " nitin archibald ah. So [facility] [phone].    ATENCIÓN: Si habla español, tiene a terry disposición servicios gratuitos de asistencia lingüística. Llame al [phone].    We comply with applicable federal civil rights laws and [place] laws. We do not discriminate on the basis of race, color, national origin, age, disability, sex, sexual orientation, or gender identity.            After Visit Summary       This is your record. Keep this with you and show to your community pharmacist(s) and doctor(s) at your next visit.
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
## 2018-03-22 NOTE — ED NOTES
Patient having increasing shortness of breath  Is wheezing   And having  Pain  In chest  No relief with home medications

## 2018-03-22 NOTE — MR AVS SNAPSHOT
MRN:[id number]                      After Visit Summary   3/22/2018    Bia Bill    MRN: [id number]           Visit Information        Provider Department      3/22/2018 10:30 AM Arturo Kd CHAWLA [facility] Generic      Your next 10 appointments already scheduled     Apr 03, 2018  1:00 PM CDT   Return Visit with Kd Morris   [facility] ([facility])    [address] [address]   [phone]            Apr 06, 2018  1:30 PM CDT   LAB with [facility] ([facility])    [address] [address]   [phone]           Please do not eat 10-12 hours before your appointment if you are coming in fasting for labs on lipids, cholesterol, or glucose (sugar). This does not apply to pregnant women. Water, hot tea and black coffee (with nothing added) are okay. Do not drink other fluids, diet soda or chew gum.            Apr 19, 2018  9:30 AM CDT   Return Visit with Kd Morris   [facility] ([facility])    [address] [address]   [phone]            Jun 22, 2018  1:00 PM CDT   LAB with [facility]   [facility] ([facility])    [address] [address]   [phone]           Please do not eat 10-12 hours before your appointment if you are coming in fasting for labs on lipids, cholesterol, or glucose (sugar). This does not apply to pregnant women. Water, hot tea and black coffee (with nothing added) are okay. Do not drink other fluids, diet soda or chew gum.            Sep 21, 2018  1:00 PM CDT   LAB with [facility] ([facility])    [address] [place] [address]   [phone]           Please do not eat 10-12 hours before your appointment if you are coming in fasting for labs on lipids, cholesterol, or glucose  "(sugar). This does not apply to pregnant women. Water, hot tea and black coffee (with nothing added) are okay. Do not drink other fluids, diet soda or chew gum.            Sep 28, 2018  1:30 PM CDT   Return Visit with Joshua Watts MD   [facility] ([facility])    [facility]  [address]  [facility] [place] [address] [phone]              MyChart Information     [employer] lets you send messages to your doctor, view your test results, renew your prescriptions, schedule appointments and more. To sign up, go to [email or web address] . Click on \"Log in\" on the left side of the screen, which will take you to the Welcome page. Then click on \"Sign up Now\" on the right side of the page.     You will be asked to enter the access code listed below, as well as some personal information. Please follow the directions to create your username and password.     Your access code is: [email or web address]  Expires: 2018  1:16 PM     Your access code will  in 90 days. If you need help or a new code, please call your [place] clinic or [phone].        Care EveryWhere ID     This is your Care EveryWhere ID. This could be used by other organizations to access your [place] medical records  [id number]        Equal Access to Services     ANTIONETTE IRBY : Hadii jose giraldo Sojossy, waaxda luqadaha, qaybta kaalmada obed, nitin roberts . So [facility] [phone].    ATENCIÓN: Si habla español, tiene a terry disposición servicios gratuitos de asistencia lingüística. Carlosame al [phone].    We comply with applicable federal civil rights laws and [place] laws. We do not discriminate on the basis of race, color, national origin, age, disability, sex, sexual orientation, or gender identity.            "

## 2018-03-23 ENCOUNTER — ANTICOAGULATION THERAPY VISIT (OUTPATIENT)
Dept: ANTICOAGULATION | Facility: CLINIC | Age: 53
End: 2018-03-23

## 2018-03-23 ENCOUNTER — ONCOLOGY VISIT (OUTPATIENT)
Dept: ONCOLOGY | Facility: CLINIC | Age: 53
End: 2018-03-23
Attending: INTERNAL MEDICINE
Payer: COMMERCIAL

## 2018-03-23 VITALS
HEART RATE: 89 BPM | SYSTOLIC BLOOD PRESSURE: 118 MMHG | OXYGEN SATURATION: 94 % | WEIGHT: 224.9 LBS | BODY MASS INDEX: 45.34 KG/M2 | RESPIRATION RATE: 18 BRPM | DIASTOLIC BLOOD PRESSURE: 55 MMHG | TEMPERATURE: 98.2 F | HEIGHT: 59 IN

## 2018-03-23 DIAGNOSIS — D75.1 POLYCYTHEMIA, SECONDARY: Chronic | ICD-10-CM

## 2018-03-23 DIAGNOSIS — D75.1 ERYTHROCYTOSIS: Chronic | ICD-10-CM

## 2018-03-23 DIAGNOSIS — J44.9 CHRONIC OBSTRUCTIVE PULMONARY DISEASE, UNSPECIFIED COPD TYPE (H): Chronic | ICD-10-CM

## 2018-03-23 DIAGNOSIS — I10 HTN, GOAL BELOW 140/90: ICD-10-CM

## 2018-03-23 DIAGNOSIS — D75.1 ERYTHROCYTOSIS: Primary | Chronic | ICD-10-CM

## 2018-03-23 DIAGNOSIS — I73.9 PVD (PERIPHERAL VASCULAR DISEASE) WITH CLAUDICATION (H): ICD-10-CM

## 2018-03-23 DIAGNOSIS — Z79.01 LONG TERM CURRENT USE OF ANTICOAGULANT THERAPY: ICD-10-CM

## 2018-03-23 DIAGNOSIS — I73.9 PERIPHERAL VASCULAR DISEASE WITH CLAUDICATION (H): ICD-10-CM

## 2018-03-23 DIAGNOSIS — I82.409 DVT (DEEP VENOUS THROMBOSIS) (H): ICD-10-CM

## 2018-03-23 DIAGNOSIS — J45.30 MILD PERSISTENT ASTHMA WITHOUT COMPLICATION: Chronic | ICD-10-CM

## 2018-03-23 DIAGNOSIS — E03.9 HYPOTHYROIDISM, UNSPECIFIED TYPE: Chronic | ICD-10-CM

## 2018-03-23 LAB
HCT VFR BLD AUTO: 64.4 % (ref 35–47)
HGB BLD-MCNC: 19.8 G/DL (ref 11.7–15.7)
INR PPP: 3.36 (ref 0.86–1.14)
INR PPP: 3.66 (ref 0.86–1.14)

## 2018-03-23 PROCEDURE — 85018 HEMOGLOBIN: CPT | Performed by: FAMILY MEDICINE

## 2018-03-23 PROCEDURE — 99214 OFFICE O/P EST MOD 30 MIN: CPT | Performed by: INTERNAL MEDICINE

## 2018-03-23 PROCEDURE — 36415 COLL VENOUS BLD VENIPUNCTURE: CPT | Performed by: FAMILY MEDICINE

## 2018-03-23 PROCEDURE — 85014 HEMATOCRIT: CPT | Performed by: FAMILY MEDICINE

## 2018-03-23 PROCEDURE — 85610 PROTHROMBIN TIME: CPT | Performed by: FAMILY MEDICINE

## 2018-03-23 PROCEDURE — G0463 HOSPITAL OUTPT CLINIC VISIT: HCPCS

## 2018-03-23 PROCEDURE — 99207 ZZC NO CHARGE NURSE ONLY: CPT

## 2018-03-23 ASSESSMENT — ANXIETY QUESTIONNAIRES
GAD7 TOTAL SCORE: 10
7. FEELING AFRAID AS IF SOMETHING AWFUL MIGHT HAPPEN: NOT AT ALL
5. BEING SO RESTLESS THAT IT IS HARD TO SIT STILL: MORE THAN HALF THE DAYS
6. BECOMING EASILY ANNOYED OR IRRITABLE: MORE THAN HALF THE DAYS
2. NOT BEING ABLE TO STOP OR CONTROL WORRYING: SEVERAL DAYS
3. WORRYING TOO MUCH ABOUT DIFFERENT THINGS: SEVERAL DAYS
4. TROUBLE RELAXING: MORE THAN HALF THE DAYS
1. FEELING NERVOUS, ANXIOUS, OR ON EDGE: MORE THAN HALF THE DAYS

## 2018-03-23 ASSESSMENT — PAIN SCALES - GENERAL: PAINLEVEL: EXTREME PAIN (8)

## 2018-03-23 NOTE — MR AVS SNAPSHOT
After Visit Summary   3/23/2018    Bia Bill    MRN: [id number]           Patient Information     Date Of Birth          1965        Visit Information        Provider Department      3/23/2018 1:00 PM Joshua Watts MD [facility] ONCOLOGY      Today's Diagnoses     Erythrocytosis    -  1      Care Instructions    We would like to see you back in clinic with Dr. Watts in 6 months with standing lab work (CBC) every 3 months.  When you are in need of a refill, please call your pharmacy and they will send us a request.  Copy of appointments, and after visit summary (AVS) given to patient.  If you have any questions during business hours (M-F 8 AM- 4PM), please call Deepthi Condon RN, BSN, OCN Oncology Hematology /Breast Cancer Navigator at [facility] [phone].   For questions after business hours, or on holidays/weekends, please call our after hours Nurse Triage line [phone]. Thank you.            Follow-ups after your visit        Follow-up notes from your care team     Return in about 6 months (around 9/23/2018) for Standing blood work.      Your next 10 appointments already scheduled     Apr 03, 2018  1:00 PM CDT   [facility]    [address] [address]   [phone]            Apr 06, 2018  1:30 PM CDT   LAB with [facility] ([facility])    [address] [address]   [phone]           Please do not eat 10-12 hours before your appointment if you are coming in fasting for labs on lipids, cholesterol, or glucose (sugar). This does not apply to pregnant women. Water, hot tea and black coffee (with nothing added) are okay. Do not drink other fluids, diet soda or chew gum.            Apr 19, 2018  9:30 AM CDT   [facility]    [address] [address]   [phone]            Jun 22, 2018  1:00 PM CDT   LAB with [facility] ([facility])    [address] [address]   [phone]           Please do not eat 10-12 hours before your appointment if you are coming in fasting for labs on lipids, cholesterol, or glucose (sugar). This does not apply to pregnant women. Water, hot tea and black coffee (with nothing added) are okay. Do not drink other fluids, diet soda or chew gum.            Sep 21, 2018  1:00 PM CDT   LAB with [facility] [facility]   [facility] ([facility])    [address] [address]   [phone]           Please do not eat 10-12 hours before your appointment if you are coming in fasting for labs on lipids, cholesterol, or glucose (sugar). This does not apply to pregnant women. Water, hot tea and black coffee (with nothing added) are okay. Do not drink other fluids, diet soda or chew gum.            Sep 28, 2018  1:30 PM CDT   Return Visit with Joshua Watts MD   [facility] ([facility])    [facility]  [address]  [facility] [place] [address]   [phone]              Future tests that were ordered for you today     Open Standing Orders        Priority Remaining Interval Expires Ordered    CBC with platelets differential Routine 4/4 every 3 months 3/23/2019 3/23/2018            Who to contact     If you have questions or need follow up information about today's clinic visit or your schedule please contact [facility] directly at [phone].  Normal or non-critical lab and imaging results will be communicated to you by MyChart, letter or phone within 4 business days after the clinic has received the results. If you do not hear from us within 7 days, please contact the clinic through MyChart or phone. If you  "have a critical or abnormal lab result, we will notify you by phone as soon as possible.  Submit refill requests through [employer] or call your pharmacy and they will forward the refill request to us. Please allow 3 business days for your refill to be completed.          Additional Information About Your Visit        [employer] Information     ENJ[employer] lets you send messages to your doctor, view your test results, renew your prescriptions, schedule appointments and more. To sign up, go to [email or web address] . Click on \"Log in\" on the left side of the screen, which will take you to the Welcome page. Then click on \"Sign up Now\" on the right side of the page.     You will be asked to enter the access code listed below, as well as some personal information. Please follow the directions to create your username and password.     Your access code is: [email or web address]  Expires: 2018  1:16 PM     Your access code will  in 90 days. If you need help or a new code, please call your [place] clinic or [phone].        Care EveryWhere ID     This is your Care EveryWhere ID. This could be used by other organizations to access your [place] medical records  [id number]        Your Vitals Were     Pulse Temperature Respirations Height Last Period Pulse Oximetry    89 98.2  F (36.8  C) (Tympanic) 18 1.499 m (4' 11\") 2009 94%    Breastfeeding? BMI (Body Mass Index)                No 45.42 kg/m2           Blood Pressure from Last 3 Encounters:   18 118/55   18 124/72   18 129/71    Weight from Last 3 Encounters:   18 102 kg (224 lb 14.4 oz)   18 102 kg (224 lb 13.9 oz)   18 102 kg (224 lb 14.4 oz)               Primary Care Provider Office Phone # Fax #    Kd Leong [phone]       [facility] [address] [facility] [address]        Equal Access to Services     ANTIONETTE IRBY AH: Elfego Clark, waldemar rust, qaybta kwadwo " nitin claymiller celestinaan ah. Charlee [facility] [phone].    ATENCIÓN: Si rashid magana, tiene a terry disposición servicios gratuitos de asistencia lingüística. Natalie al [phone].    We comply with applicable federal civil rights laws and [place] laws. We do not discriminate on the basis of race, color, national origin, age, disability, sex, sexual orientation, or gender identity.            Thank you!     Thank you for choosing [facility]  for your care. Our goal is always to provide you with excellent care. Hearing back from our patients is one way we can continue to improve our services. Please take a few minutes to complete the written survey that you may receive in the mail after your visit with us. Thank you!             Your Updated Medication List - Protect others around you: Learn how to safely use, store and throw away your medicines at www.disposemymeds.org.          This list is accurate as of 3/23/18  1:50 PM.  Always use your most recent med list.                   Brand Name Dispense Instructions for use Diagnosis    acetaminophen 500 MG tablet    TYLENOL     Take 500-1,000 mg by mouth every 6 hours as needed for mild pain        * albuterol 108 (90 BASE) MCG/ACT Inhaler    PROAIR HFA/PROVENTIL HFA/VENTOLIN HFA    3 Inhaler    Inhale 2 puffs into the lungs every 6 hours as needed for shortness of breath / dyspnea or wheezing    Mild persistent asthma without complication       * albuterol (2.5 MG/3ML) 0.083% neb solution     1 Box    Take 1 vial (2.5 mg) by nebulization every 6 hours as needed for shortness of breath / dyspnea or wheezing    Mild persistent asthma without complication       allopurinol 100 MG tablet    ZYLOPRIM    90 tablet    TAKE ONE TABLET BY MOUTH ONE TIME DAILY    Acute gouty arthritis       ARIPiprazole 5 MG tablet    ABILIFY    180 tablet    Take 1.5 tablets (7.5 mg) by mouth At Bedtime    Major depressive disorder, recurrent episode, moderate (H)        buPROPion 150 MG 24 hr tablet    WELLBUTRIN XL    30 tablet    TAKE  ONE TABLET BY MOUTH EVERY DAY IN THE MORNING    COPD exacerbation (H)       calcium citrate-vitamin D 315-250 MG-UNIT Tabs per tablet    CITRACAL     Take 2 tablets by mouth daily        doxycycline monohydrate 100 MG capsule     20 capsule    Take 1 capsule (100 mg) by mouth 2 times daily for 10 days    Chronic obstructive pulmonary disease, unspecified COPD type (H)       EPINEPHrine 0.3 MG/0.3ML injection 2-pack    EPIPEN/ADRENACLICK/or ANY BX GENERIC EQUIV    0.6 mL    Inject 0.3 mLs (0.3 mg) into the muscle once as needed for anaphylaxis    Anaphylactic reaction to bee sting, undetermined intent, subsequent encounter       fluticasone 50 MCG/ACT spray    FLONASE    1 Bottle    Spray 1-2 sprays into both nostrils daily    Nasal congestion       furosemide 20 MG tablet    LASIX    60 tablet    TAKE ONE TABLET BY MOUTH TWICE DAILY    Lymphedema of both lower extremities       gabapentin 600 MG tablet    NEURONTIN    90 tablet    TAKE ONE TABLET BY MOUTH THREE TIMES DAILY    Polyneuropathy in other diseases classified elsewhere (H)       ipratropium - albuterol 0.5 mg/2.5 mg/3 mL 0.5-2.5 (3) MG/3ML neb solution    DUONEB    30 vial    Take 1 vial (3 mLs) by nebulization every 6 hours as needed for shortness of breath / dyspnea or wheezing    Chronic obstructive pulmonary disease, unspecified COPD type (H)       lamoTRIgine 200 MG tablet    LaMICtal    90 tablet    Take 1 tablet (200 mg) by mouth daily    Major depressive disorder, recurrent episode, moderate (H)       levothyroxine 150 MCG tablet    SYNTHROID/LEVOTHROID    90 tablet    TAKE ONE TABLET BY MOUTH DAILY    Hypothyroidism, unspecified type       metoclopramide 10 MG tablet    REGLAN    40 tablet    Take 1 tablet (10 mg) by mouth 4 times daily as needed    Flatulence, eructation, and gas pain       mometasone-formoterol 100-5 MCG/ACT oral inhaler    DULERA    13 g    Inhale 2  "puffs into the lungs 2 times daily    Mild persistent asthma without complication       omeprazole 20 MG CR capsule    priLOSEC    180 capsule    TAKE 1 CAPSULE (20 MG) BY MOUTH 2 TIMES DAILY    Gastroesophageal reflux disease without esophagitis       ondansetron 4 MG tablet    ZOFRAN    10 tablet    Take 1 tablet (4 mg) by mouth every 8 hours as needed for nausea    Nausea and vomiting, intractability of vomiting not specified, unspecified vomiting type       * order for DME      Equipment being ordered: CPAP AIRSENSE 10 5-18 CM H20 # [id number]   DN# [id number]        * order for DME     1 Month    Equipment being ordered: INCONTINENCE PADS  QID    Other urinary incontinence       * order for DME     60 Month    Equipment being ordered: DEPENDS SIZE LARGE    Mixed incontinence       * order for DME     2 Units    Equipment being ordered: [id number]\" x2pair   \"20-30mmHg Farrow Hybrid Liners\" x 2 pair    Peripheral edema       predniSONE 20 MG tablet    DELTASONE    18 tablet    Take 3 tabs (60 mg) by mouth daily x 3 days, 2 tabs (40 mg) daily x 3 days, 1 tab (20 mg) daily x 3    Chronic obstructive pulmonary disease, unspecified COPD type (H)       simvastatin 20 MG tablet    ZOCOR    90 tablet    TAKE ONE TABLET BY MOUTH AT BEDTIME    Hypercholesteremia       traZODone 150 MG tablet    DESYREL    30 tablet    TAKE ONE TABLET BY MOUTH AT BEDTIME    Major depressive disorder, recurrent episode, moderate (H)       warfarin 5 MG tablet    COUMADIN    180 tablet    12.5 mg on Wed and Sat 10 mg all other days    DVT (deep venous thrombosis) (H)       * Notice:  This list has 6 medication(s) that are the same as other medications prescribed for you. Read the directions carefully, and ask your doctor or other care provider to review them with you.      "

## 2018-03-23 NOTE — MR AVS SNAPSHOT
Bia S Bill   3/23/2018   Anticoagulation Therapy Visit    Description:  52 year old female   Provider:  Mireille Torres, RN   Department:  [facility]           INR as of 3/23/2018     Today's INR 3.66!      Anticoagulation Summary as of 3/23/2018     INR goal 2.0-2.5   Today's INR 3.66!   Full instructions 3/23: Hold; 3/24: 7.5 mg; 3/26: 7.5 mg; 3/28: 10 mg; Otherwise 12.5 mg on Wed, Sat; 10 mg all other days   Next INR check 3/30/2018    Indications   Long term current use of anticoagulant therapy [Z79.01]  DVT (deep venous thrombosis) (H) [I82.409]  PVD (peripheral vascular disease) with claudication (H) [I73.9]         March 2018 Details    Sun Mon Tue Wed Thu Fri Sat         1               2               3                 4               5               6               7               8               9               10                 11               12               13               14               15               16               17                 18               19               20               21               22               23      Hold   See details      24      7.5 mg           25      10 mg         26      7.5 mg         27      10 mg         28      10 mg         29      10 mg         30            31                Date Details   03/23 This INR check       Date of next INR:  3/30/2018         How to take your warfarin dose     To take:  7.5 mg Take 1.5 of the 5 mg tablets.    To take:  10 mg Take 2 of the 5 mg tablets.    Hold Do not take your warfarin dose. See the Details table to the right for additional instructions.

## 2018-03-23 NOTE — LETTER
3/23/2018         RE: Bia Bill  [address]  [facility] [address]        Dear Colleague,    Thank you for referring your patient, Bia Bill, to the [facility]. Please see a copy of my visit note below.    Hematology/ Oncology Follow-up Visit:  Mar 23, 2018    Reason for Visit:   Chief Complaint   Patient presents with     Hematology     6 month follow up Erythrocytosis/Anemia. Review lab results.        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 She was previously seen by Dr. Lomax on 10/09/2011, but has been lost from followup. The patient has had erythrocytosis with a hemoglobin around 20-23 and hemoglobin and hematocrit of 60-65% for most of her life. She had a previous workup by Dr. Lomax and also by another [facility] [place]. Review of her previous tests and workup showed a hemoglobin electrophoresis on 01/17/2007 that showed a hemoglobin A1 of 52%, A2 at 0, hemoglobin F at 1.4% and other hemoglobin 46.2%. JAK2 was negative on 07/31/2007. Hemoglobin affinity testing on 07/31/2007 showed a P50 of 27 (normal between 25 and 29). Repeat hemoglobin electrophoresis on 10/04/2011 showed the hemoglobin was at 39.9%. This illuminated hemoglobin was suggestive of beta globin variant of hemoglobin Hb [place]. She was also seen by Dr. Peres in 2007 and most of the workup is from there. Erythropoietin level was in the range of the 80s. Vitamin B12 and folic acid were within normal limits. Abdomen ultrasound did not show hepatosplenomegaly, but there is fatty infiltration of the liver. According to the chart, the patient had phlebotomies done in the past to keep  her hemoglobin below 20 g/dl. This was done over the summertime of 2011. Most recently she didn't have any phlebotomy. She was started on anticoagulation with Coumadin as she was felt to be at high risk for blood clots and given history of severe arterial disease and stroke in the past. The patient is safe to use her anticoagulation and her most recent INR was 1.74. Evidently her carbon monoxide is also elevated. She is an active smoker, but she is cutting down from 2 packs per day to 3 cigarettes a day over the last one month. Patient denies any DVT or PE. She also evidently claims that her mother and her first son are also having the same hemoglobinopathy and are being followed by their physicians. Bone marrow biopsy on 11/17/2014 showed no clonal chromosomal abnormality and no cytogenetic evidence of a malignant process. She also tested negative for the JAK2 mutation. Leukemia lymphoma evaluation additionally showed no aberrant immunophenotype on T cells and no increase in or abnormal appearing myeloid blasts.       Interval History:  Patient seen today for follow-up.  She has been feeling well without any recent complaints weight loss night sweats fever or chills.  She denies any nausea vomiting or diarrhea.  She denies any recent weight loss.    Review Of Systems:  Constitutional: Negative for fever, chills, and night sweats.  Skin: negative.  Eyes: negative.  Ears/Nose/Throat: negative.  Respiratory: No shortness of breath, dyspnea on exertion, cough, or hemoptysis.  Cardiovascular: negative.  Gastrointestinal: negative.  Genitourinary: negative.  Musculoskeletal: negative.  Neurologic: negative.  Psychiatric: negative.  Hematologic/Lymphatic/Immunologic: negative.  Endocrine: negative.    All other ROS negative unless mentioned in interval history.    Past medical, social, surgical, and family histories reviewed.    Allergies:  Allergies as of 03/23/2018 - Rylan as Reviewed 03/23/2018   Allergen Reaction Noted      Darvocet [propoxyphene n-apap] Anaphylaxis 07/26/2006     Percocet [oxycodone-acetaminophen] Anaphylaxis, Hives, and Swelling 06/01/2016     Asa [aspirin] Hives 07/10/2006     Bee  03/26/2010     Lyrica [pregabalin] Other (See Comments) and Swelling 12/04/2015     Povidone iodine Rash 01/25/2017     Tape [adhesive tape] Rash 08/03/2006       Current Medications:  Current Outpatient Prescriptions   Medication Sig Dispense Refill     predniSONE (DELTASONE) 20 MG tablet Take 3 tabs (60 mg) by mouth daily x 3 days, 2 tabs (40 mg) daily x 3 days, 1 tab (20 mg) daily x 3 18 tablet 0     ondansetron (ZOFRAN) 4 MG tablet Take 1 tablet (4 mg) by mouth every 8 hours as needed for nausea 10 tablet 0     gabapentin (NEURONTIN) 600 MG tablet TAKE ONE TABLET BY MOUTH THREE TIMES DAILY 90 tablet 0     ipratropium - albuterol 0.5 mg/2.5 mg/3 mL (DUONEB) 0.5-2.5 (3) MG/3ML neb solution Take 1 vial (3 mLs) by nebulization every 6 hours as needed for shortness of breath / dyspnea or wheezing 30 vial 1     fluticasone (FLONASE) 50 MCG/ACT spray Spray 1-2 sprays into both nostrils daily 1 Bottle 11     buPROPion (WELLBUTRIN XL) 150 MG 24 hr tablet TAKE  ONE TABLET BY MOUTH EVERY DAY IN THE MORNING 30 tablet 0     furosemide (LASIX) 20 MG tablet TAKE ONE TABLET BY MOUTH TWICE DAILY 60 tablet 0     traZODone (DESYREL) 150 MG tablet TAKE ONE TABLET BY MOUTH AT BEDTIME 30 tablet 0     warfarin (COUMADIN) 5 MG tablet 12.5 mg on Wed and Sat 10 mg all other days 180 tablet 2     metoclopramide (REGLAN) 10 MG tablet Take 1 tablet (10 mg) by mouth 4 times daily as needed 40 tablet 0     albuterol (PROAIR HFA/PROVENTIL HFA/VENTOLIN HFA) 108 (90 BASE) MCG/ACT Inhaler Inhale 2 puffs into the lungs every 6 hours as needed for shortness of breath / dyspnea or wheezing 3 Inhaler 1     omeprazole (PRILOSEC) 20 MG CR capsule TAKE 1 CAPSULE (20 MG) BY MOUTH 2 TIMES DAILY 180 capsule 1     albuterol (2.5 MG/3ML) 0.083% neb solution Take 1 vial (2.5  "mg) by nebulization every 6 hours as needed for shortness of breath / dyspnea or wheezing 1 Box 0     levothyroxine (SYNTHROID/LEVOTHROID) 150 MCG tablet TAKE ONE TABLET BY MOUTH DAILY 90 tablet 2     allopurinol (ZYLOPRIM) 100 MG tablet TAKE ONE TABLET BY MOUTH ONE TIME DAILY 90 tablet 2     lamoTRIgine (LAMICTAL) 200 MG tablet Take 1 tablet (200 mg) by mouth daily 90 tablet 3     ARIPiprazole (ABILIFY) 5 MG tablet Take 1.5 tablets (7.5 mg) by mouth At Bedtime 180 tablet 3     mometasone-formoterol (DULERA) 100-5 MCG/ACT oral inhaler Inhale 2 puffs into the lungs 2 times daily 13 g 11     simvastatin (ZOCOR) 20 MG tablet TAKE ONE TABLET BY MOUTH AT BEDTIME 90 tablet 3     order for DME Equipment being ordered: CPAP  AIRSENSE 10  5-18 CM H20  SN# [id number]   DN# [id number]       order for DME Equipment being ordered: [id number]\" x2pair     \"20-30mmHg [place] Hybrid Liners\" x 2 pair 2 Units 11     order for DME Equipment being ordered: DEPENDS SIZE LARGE 60 Month 5     order for DME Equipment being ordered: INCONTINENCE PADS   QID 1 Month 11     acetaminophen (TYLENOL) 500 MG tablet Take 500-1,000 mg by mouth every 6 hours as needed for mild pain       calcium citrate-vitamin D (CITRACAL) 315-250 MG-UNIT TABS per tablet Take 2 tablets by mouth daily       EPINEPHrine 0.3 MG/0.3ML injection Inject 0.3 mLs (0.3 mg) into the muscle once as needed for anaphylaxis (Patient not taking: Reported on 3/23/2018) 0.6 mL 0        Physical Exam:  /55 (BP Location: Right arm, Patient Position: Sitting, Cuff Size: Adult Large)  Pulse 89  Temp 98.2  F (36.8  C) (Tympanic)  Resp 18  Ht 1.499 m (4' 11\")  Wt 102 kg (224 lb 14.4 oz)  LMP 09/27/2009  SpO2 94%  Breastfeeding? No  BMI 45.42 kg/m2  Wt Readings from Last 12 Encounters:   03/23/18 102 kg (224 lb 14.4 oz)   03/22/18 102 kg (224 lb 13.9 oz)   03/14/18 102 kg (224 lb 14.4 oz)   12/01/17 100.2 kg (221 lb)   11/13/17 99.9 kg (220 lb 3.2 oz)   11/08/17 100 kg (220 lb 8 " oz)   11/07/17 99.3 kg (219 lb)   10/25/17 99.3 kg (219 lb)   10/19/17 102.9 kg (226 lb 14.4 oz)   10/17/17 100.9 kg (222 lb 6.4 oz)   09/22/17 102.2 kg (225 lb 3.2 oz)   07/10/17 104.2 kg (229 lb 11.2 oz)     GENERAL APPEARANCE: Healthy, alert and in no acute distress.  HEENT: Sclerae anicteric. PERRLA. Oropharynx without ulcers, lesions, or thrush.  NECK: Supple. No asymmetry or masses.  LYMPHATICS: No palpable cervical, supraclavicular, axillary, or inguinal lymphadenopathy.  RESP: Lungs clear to auscultation bilaterally without rales, rhonchi or wheezes.  CARDIOVASCULAR: Regular rate and rhythm. Normal S1, S2; no S3 or S4. No murmur, gallop, or rub.  ABDOMEN: Soft, nontender. Bowel sounds normal. No palpable organomegaly or masses.  MUSCULOSKELETAL: Extremities without gross deformities noted. No edema of bilateral lower extremities.  SKIN: No suspicious lesions or rashes.  NEURO: Alert and oriented x 3. Cranial nerves II-XII grossly intact.  PSYCHIATRIC: Mentation and affect appear normal.    Laboratory/Imaging Studies:  Orders Only on 03/23/2018   Component Date Value Ref Range Status     INR 03/23/2018 3.66* 0.86 - 1.14 Final    Special tube used to correct for high hematocrit     Hematocrit 03/23/2018 64.4* 35.0 - 47.0 % Final     Hemoglobin 03/23/2018 19.8* 11.7 - 15.7 g/dL Final   Orders Only on 03/16/2018   Component Date Value Ref Range Status     INR 03/16/2018 2.86* 0.86 - 1.14 Final    Special tube used to correct for high hematocrit     Hematocrit 03/16/2018 57.2* 35.0 - 47.0 % Final     Hemoglobin 03/16/2018 18.2* 11.7 - 15.7 g/dL Final   Office Visit on 03/14/2018   Component Date Value Ref Range Status     WBC 03/14/2018 5.4  4.0 - 11.0 10e9/L Final     RBC Count 03/14/2018 6.74* 3.8 - 5.2 10e12/L Final     Hemoglobin 03/14/2018 19.3* 11.7 - 15.7 g/dL Final     Hematocrit 03/14/2018 60.0* 35.0 - 47.0 % Final     MCV 03/14/2018 89  78 - 100 fl Final     MCH 03/14/2018 28.6  26.5 - 33.0 pg Final      MCHC 03/14/2018 32.2  31.5 - 36.5 g/dL Final     RDW 03/14/2018 21.7* 10.0 - 15.0 % Final     Platelet Count 03/14/2018 156  150 - 450 10e9/L Final     Diff Method 03/14/2018 Automated Method   Final     % Neutrophils 03/14/2018 52.8  % Final     % Lymphocytes 03/14/2018 34.1  % Final     % Monocytes 03/14/2018 11.0  % Final     % Eosinophils 03/14/2018 1.5  % Final     % Basophils 03/14/2018 0.2  % Final     % Immature Granulocytes 03/14/2018 0.4  % Final     Absolute Neutrophil 03/14/2018 2.8  1.6 - 8.3 10e9/L Final     Absolute Lymphocytes 03/14/2018 1.8  0.8 - 5.3 10e9/L Final     Absolute Monocytes 03/14/2018 0.6  0.0 - 1.3 10e9/L Final     Absolute Eosinophils 03/14/2018 0.1  0.0 - 0.7 10e9/L Final     Absolute Basophils 03/14/2018 0.0  0.0 - 0.2 10e9/L Final     Abs Immature Granulocytes 03/14/2018 0.0  0 - 0.4 10e9/L Final     Sodium 03/14/2018 140  133 - 144 mmol/L Final     Potassium 03/14/2018 3.6  3.4 - 5.3 mmol/L Final     Chloride 03/14/2018 103  94 - 109 mmol/L Final     Carbon Dioxide 03/14/2018 29  20 - 32 mmol/L Final     Anion Gap 03/14/2018 8  3 - 14 mmol/L Final     Glucose 03/14/2018 101* 70 - 99 mg/dL Final    Fasting specimen     Urea Nitrogen 03/14/2018 10  7 - 30 mg/dL Final     Creatinine 03/14/2018 0.81  0.52 - 1.04 mg/dL Final     GFR Estimate 03/14/2018 75  >60 mL/min/1.7m2 Final    Non  GFR Calc     GFR Estimate If Black 03/14/2018 >90  >60 mL/min/1.7m2 Final    African American GFR Calc     Calcium 03/14/2018 8.3* 8.5 - 10.1 mg/dL Final     Bilirubin Total 03/14/2018 0.4  0.2 - 1.3 mg/dL Final     Albumin 03/14/2018 3.3* 3.4 - 5.0 g/dL Final     Protein Total 03/14/2018 6.2* 6.8 - 8.8 g/dL Final     Alkaline Phosphatase 03/14/2018 100  40 - 150 U/L Final     ALT 03/14/2018 27  0 - 50 U/L Final     AST 03/14/2018 16  0 - 45 U/L Final     Influenza A/B Agn Specimen 03/14/2018 Nasal   Final     Influenza A 03/14/2018 Negative  NEG^Negative Final     Influenza B 03/14/2018  Negative  NEG^Negative Final    Comment: Test results must be correlated with clinical data. If necessary, results   should be confirmed by a molecular assay or viral culture.          Recent Results (from the past 744 hour(s))   CT Abdomen Pelvis w Contrast    Narrative    CT ABDOMEN AND PELVIS WITH CONTRAST 3/7/2018 8:52 PM     HISTORY: Right lower quadrant abdominal pain.    COMPARISON: 10/18/2017    TECHNIQUE: Volumetric helical acquisition of CT images from the lung  bases through the symphysis pubis after the administration of 100 mL  Isovue-370 intravenous contrast. Radiation dose for this scan was  reduced using automated exposure control, adjustment of the mA and/or  kV according to patient size, or iterative reconstruction technique.    FINDINGS: The liver, adrenal glands, pancreas, and spleen demonstrate  no worrisome focal lesion. Normal appendix. At least moderate fatty  infiltration of the liver. Unremarkable gallbladder. A few low-dense  lesions are seen in the kidneys which are too small to definitively  characterize and therefore indeterminate, but based on prevalence in a  patient without a known malignancy are most likely small cysts. There  is no pelvic free fluid. Hysterectomy changes. No hydronephrosis. No  diverticulitis. No free air in the abdomen. Bone windows reveal no  destructive lesions. There are no abdominal or pelvic lymph nodes that  are abnormal by size criteria. The visualized lung bases trace  peripheral fibrosis and/or atelectasis at the lung bases. There are no  dilated loops of small bowel or colon.      Impression    IMPRESSION: No acute process demonstrated within the abdomen and  pelvis.    JUDE DAWSON MD   Chest XR,  PA & LAT    Narrative    CHEST 2 VIEWS  3/7/2018 11:53 PM     HISTORY: Cough.    COMPARISON: 10/19/2017.    FINDINGS: Increased pulmonary vascularity. No convincing pulmonary  opacities. Mild cardiomegaly again noted. Atherosclerotic  calcification in the  thoracic aorta.      Impression    IMPRESSION:  1. Increased pulmonary vascularity, suggestive of pulmonary vascular  congestion.  2. No other findings suspicious for active cardiopulmonary disease.    MEERA WILLIAMSON MD   XR Chest 2 Views    Narrative    XR CHEST 2 VW 3/14/2018 3:42 PM    HISTORY: COPD. Cough and wheezing.    COMPARISON: 3/7/2018    FINDINGS: Prominence of the pulmonary interstitium. No lobar  consolidation. No pleural effusion or pneumothorax. Cardiomegaly is  unchanged.      Impression    IMPRESSION: Mild vascular congestion.    EVELYN CALVO MD   Chest XR,  PA & LAT    Narrative    XR CHEST 2 VW 3/22/2018 1:17 PM    HISTORY: Short of breath.    COMPARISON: Several prior studies, the most recent one being dated  3/14/2018.      Impression    IMPRESSION: 2 views of the chest show stable mild cardiomegaly.  Pulmonary vascular congestion is again seen but there is no karine CHF.  There is no evidence of pneumonia.    KAMLA MUSA MD       Assessment and plan:  (D75.1) Erythrocytosis  (primary encounter diagnosis)  I reviewed with the patient today most recent CBC.  Her hematocrit has been stable.  The patient currently is symptomatic.    I will see her again in 6 months time or sooner if there are new developments or concerns.    (E03.9) Hypothyroidism, unspecified type  Patient currently on Zocor 20 mg orally daily.    (I10) HTN, goal below 140/90  Patient blood pressure is currently well-controlled  (J44.9) Chronic obstructive pulmonary disease, unspecified COPD type (H)    (J45.30) Mild persistent asthma without complication  Patient symptoms has been stable.  She is using albuterol inhaler and DuoNeb patient blood pressure is well controlled.  She is currently on nebulizer if needed.    The patient is ready to learn, no apparent learning barriers were identified.  Diagnosis and treatment plans were explained to the patient. The patient expressed understanding of the content. The patient asked  appropriate questions. The patient questions were answered to her satisfaction.    Chart documentation with Dragon Voice recognition Software. Although reviewed after completion, some words and grammatical errors may remain.    Again, thank you for allowing me to participate in the care of your patient.        Sincerely,        Joshua Watts MD

## 2018-03-23 NOTE — PROGRESS NOTES
ANTICOAGULATION FOLLOW-UP CLINIC VISIT    Patient Name:  Bia Bill  Date:  3/23/2018  Contact Type:  Telephone/ spoke with Norah on phone    SUBJECTIVE:     Patient Findings     Positives Change in diet/appetite (not eating well), Antibiotic use or infection (on doxy and prednisone for COPD), Intentional hold of therapy (3-22 per ED)    Comments Was in ED yesterday for productive cough, chest pain and emesis for 3-4 days. No changes in medications as she is already on abx and prednisone. She was advised to hold warfarin and check INR today.    Patient taking anti nausea meds so has not vomited since ED visit. She has not been eating well this week due to nausea. She did eat lunch today and will try to get protein in as she can tolerate it. Her INR actually increased since yesterday and she held her warfarin. Will hold again today and lower dosing for this next week. She will have 12% less warfarin by next Friday than she had the prior 7 days.            OBJECTIVE    INR   Date Value Ref Range Status   03/23/2018 3.66 (H) 0.86 - 1.14 Final     Comment:     Special tube used to correct for high hematocrit       ASSESSMENT / PLAN  INR assessment SUPRA    Recheck INR In: 1 WEEK    INR Location Clinic lab     Anticoagulation Summary as of 3/23/2018     INR goal 2.0-2.5   Today's INR 3.66!   Maintenance plan 12.5 mg (5 mg x 2.5) on Wed, Sat; 10 mg (5 mg x 2) all other days   Full instructions 3/23: Hold; 3/24: 7.5 mg; 3/26: 7.5 mg; 3/28: 10 mg; Otherwise 12.5 mg on Wed, Sat; 10 mg all other days   Weekly total 75 mg   Plan last modified Mireille Torres, RN (12/27/2017)   Next INR check 3/30/2018   Priority INR   Target end date Indefinite    Indications   Long term current use of anticoagulant therapy [Z79.01]  DVT (deep venous thrombosis) (H) [I82.409]  PVD (peripheral vascular disease) with claudication (H) [I73.9]         Anticoagulation Episode Summary     INR check location     Preferred lab     Send INR  reminders to [place] [facility] PO[facility]    Comments * lab draw due to elevated hematocrit (fingerstick meters don't work). LEFT LE. STENT x 3 LEFT UPPER LEG. Previous arterial clot. New goal range 2-2.5 starting 11/6/17.      Anticoagulation Care Providers     Provider Role Specialty Phone number    Kd Leong MD [facility] [phone]            See the Encounter Report to view Anticoagulation Flowsheet and Dosing Calendar (Go to Encounters tab in chart review, and find the Anticoagulation Therapy Visit)        Mireille Torres RN

## 2018-03-23 NOTE — NURSING NOTE
"Oncology Rooming Note    March 23, 2018 1:07 PM   Bia Bill is a 52 year old female who presents for:    Chief Complaint   Patient presents with     Hematology     6 month follow up Erythrocytosis/Anemia. Review lab results.      Initial Vitals: /55 (BP Location: Right arm, Patient Position: Sitting, Cuff Size: Adult Large)  Pulse 89  Temp 98.2  F (36.8  C) (Tympanic)  Resp 18  Ht 1.499 m (4' 11\")  Wt 102 kg (224 lb 14.4 oz)  LMP 09/27/2009  SpO2 94%  Breastfeeding? No  BMI 45.42 kg/m2 Estimated body mass index is 45.42 kg/(m^2) as calculated from the following:    Height as of this encounter: 1.499 m (4' 11\").    Weight as of this encounter: 102 kg (224 lb 14.4 oz). Body surface area is 2.06 meters squared.  Extreme Pain (8) Comment: Data Unavailable   Patient's last menstrual period was 09/27/2009.  Allergies reviewed: Yes  Medications reviewed: Yes    Medications: Medication refills not needed today.  Pharmacy name entered into [facility]:    [place], [place] - [address] [id number] [place], [place] - [address] [facility] [place], [place] - [address] [place] [address]    Clinical concerns:  6 month follow up Erythrocytosis/Anemia. Review lab results.  Diagnosed with an upper respitory infection x 3 weeks. C/o 8/10 chest pains, emesis x few times a day associated with eating x 5 days..     8 minutes for nursing intake (face to face time)     Janice Gonzalez, [facility]            "

## 2018-03-23 NOTE — PROGRESS NOTES
Progress Note  Disclaimer: This note consists of symbols derived from keyboarding, dictation and/or voice recognition software. As a result, there may be errors in the script that have gone undetected. Please consider this when interpreting information found in this chart.    Client Name: Bia Bill  Date: 3/22/2018         Service Type: Individual      Session Start Time: 10:30 AM  Session End Time: 11:15 AM      Session Length: 45     Session #: 27     Attendees: Client attended alone    Treatment Plan Last Reviewed: 10/24/2017  PHQ-9 / CHIN-7 : 10     DATA   Client reported her medical team has found that her left atrium is enlarged and this may be related to having difficulty getting rid of bronchitis.  She is having to set some further boundaries with family as 1 of her nieces is starting to tell her what to do her medical concerns.  Finds herself getting a little more irritable as she is tired of being sick all the time.  She has been smoke-free for a week due to being sick and is considering staying that way.   Progress Since Last Session (Related to Symptoms / Goals / Homework):   Symptoms: Stable    Homework: Achieved / completed to satisfaction      Episode of Care Goals: Satisfactory progress - ACTION (Actively working towards change); Intervened by reinforcing change plan / affirming steps taken     Current / Ongoing Stressors and Concerns:   Recurrent depression  family relational problems, medical problems, chronic pain, trauma history      Treatment Objective(s) Addressed in This Session:   Increase interest, engagement, and pleasure in doing things  Decrease frequency and intensity of feeling down, depressed, hopeless       Intervention:   CBT: Behavioral activation reviewed boundaries with family   ASSESSMENT: Current Emotional / Mental Status (status of significant symptoms):   Risk status (Self / Other harm or suicidal ideation)   Client denies  current fears or concerns for personal safety.   Client denies current or recent suicidal ideation or behaviors.   Client denies current or recent homicidal ideation or behaviors.   Client denies current or recent self injurious behavior or ideation.   Client denies other safety concerns.   A safety and risk management plan has not been developed at this time, however client was given the after-hours number / 911 should there be a change in any of these risk factors.     Appearance:   Appropriate    Eye Contact:   Good    Psychomotor Behavior: Normal    Attitude:   Cooperative    Orientation:   All   Speech    Rate / Production: Normal     Volume:  Normal    Mood:    Normal   Affect:    Appropriate    Thought Content:  Clear    Thought Form:  Coherent  Logical    Insight:    Good      Medication Review:   No changes to current psychiatric medication(s)     Medication Compliance:   Yes     Changes in Health Issues:   None reported     Chemical Use Review:   Substance Use: Chemical use reviewed, no active concerns identified      Tobacco Use: Client reports no smoking for the last week     Collateral Reports Completed:   Not Applicable    PLAN: (Client Tasks / Therapist Tasks / Other)  Client to continue With her self-care routine and maintenance of boundaries.   Maintain medication compliance and contact with psychiatry.  Take care of herself physically.  Kd Morris                                                         ________________________________________________________________________    Treatment Plan    Client's Name: Bia Bill  YOB: 1965    Date: 10/24/2017      DSM5 Diagnoses: (Sustained by DSM5 Criteria Listed Above)  Diagnoses: 296.40 Bipolar I Disorder Current or Most Recent Episode Manic, Unspecified  Psychosocial & Contextual Factors: financial difficulties, chronic pain, roommate issues  WHODAS 2.0 (12 item)               This questionnaire asks about difficulties due to  health conditions. Health conditions             include disease or illnesses, other health problems that may be short or long lasting,             injuries, mental health or emotional problems, and problems with alcohol or drugs.                         Think back over the past 30 days and answer these questions, thinking about how much             difficulty you had doing the following activities. For each question, please [place] only             one response.      S1  Standing for long periods such as 30 minutes?  Mild =           2    S2  Taking care of household responsibilities?  Moderate =   3    S3  Learning a new task, for example, learning how to get to a new place?  Mild =           2    S4  How much of a problem do you have joining community activities (for example, festivals, [employer] or other activities) in the same way as anyone else can?  Moderate =   3    S5  How much have you been emotionally affected by your health problems?  Mild =           2        In the past 30 days, how much difficulty did you have in:    S6  Concentrating on doing something for ten minutes?  Mild =           2    S7  Walking a long distance such as a kilometer (or equivalent)?  Severe =       4    S8  Washing your whole body?  None =         1    S9  Getting dressed?  None =         1    S10  Dealing with people you do not know?  Moderate =   3    S11  Maintaining a friendship?  Severe =       4    S12  Your day to day work?  Moderate =   3       H1  Overall, in the past 30 days, how many days were these difficulties present?  Record number of days seven    H2  In the past 30 days, for how many days were you totally unable to carry out your usual activities or work because of any health condition?  Record number of days  seven    H3  In the past 30 days, not counting the days that you were totally unable, for how many days did you cut back or reduce your usual activities or work because of any health condition?  Record  number of days five                   Referral / Collaboration:  Referral to another professional/service is not indicated at this time..    Anticipated number of session or this episode of care: 15    Goals  1. Education- the Biopsychosocial model of depression  a. Client will be able to describe how depression is effecting them physically, emotionally and socially  2. Behavioral Activation  a. Client will learn to assess their depression on a day to day basis  b. Client will Identify two forms of exercise/activity and engage in them 3 times per week  c. Client will Identify 3 things that make them laugh, and engage in these 5 times per week.  d. Client will Identify 1-2Creative activities or hobbies  and engage in them 2 times per week  e. Client will identify music, movies, books that make them feel good and use them 3-4 times per week  3. Self-care  a. Client will identify 5 things they can do just for themselves  b. Client will take time for quiet, reflection, meditation 5 times per week  c. Client will Learn to set boundaries when appropriate  d. Client will Identify 2 individuals they can call on for support, distraction  4. Assessment of progress  a. Client will engage in assessment of their progress on a regular basis    Bipolar Disorder  Treatment plan:  1. Education- the Biopsychosocial model of Bipolar Disorder    a. Client will be able to describe in general terms what Bipolar Disorder  is and is not  b. Client will be able to describe how Bipolar Disorder  has affected their life in at least two different areas, such as school or work and Home/relationships  c. Clients parents/guardians or significant others will be provided information on what Bipolar Disorder  is and the ways it can affect relationships and be encouraged to be a part of clients treatment team.  2. Medication  a. Client will participate in medication evaluation for Bipolar Disorder  symptoms and follow medication recommendations.    3. Identification and management of triggers  a. Client and therapist will examine patient s history to determine if there are predictable triggers for manic or depressive episodes (e.g. boredom, anger, family stress)  b. Client and therapist will develop means of diffusing these triggers (e.g. relaxation strategies, boundary setting, anger management)  4. Comorbid conditions   a. Client and therapist will asses for comorbid conditions (e.g. anxiety, depression, substance use). and add additional items to treatment plan as needed  5. Self-care  a. Client will identify 3 things they can do just for themselves  b. Client will take time for quiet, reflection, meditation 3 times per week  c. Client will Learn to set boundaries when appropriate  d. Client will Identify 2 individuals they can call on for support, distraction  5. Behavioral Activation  a. Client will Identify two forms of exercise/activity and engage in them 3 times per week  b. Client will Identify 2 things that make them laugh, and engage in these 3 times per week.  c. Client will Identify 2 Creative activities or hobbies  and engage in them 2 times per week  d. Client will identify music, movies, books that make them feel good and use them 3 times per week  6. Assessment of progress  a. Client will engage in assessment of their progress on a regular basis    Client has reviewed and agreed to the above plan.      Kd Morris  10/24/2017 12/27/2017

## 2018-03-23 NOTE — PATIENT INSTRUCTIONS
We would like to see you back in clinic with Dr. Watts in 6 months with standing lab work (CBC) every 3 months.  When you are in need of a refill, please call your pharmacy and they will send us a request.  Copy of appointments, and after visit summary (AVS) given to patient.  If you have any questions during business hours (M-F 8 AM- 4PM), please call Deepthi Condon RN, BSN, OCN Oncology Hematology /Breast Cancer Navigator at [facility] [phone].   For questions after business hours, or on holidays/weekends, please call our after hours Nurse Triage line [phone]. Thank you.

## 2018-03-24 ASSESSMENT — ANXIETY QUESTIONNAIRES: GAD7 TOTAL SCORE: 10

## 2018-03-24 ASSESSMENT — PATIENT HEALTH QUESTIONNAIRE - PHQ9: SUM OF ALL RESPONSES TO PHQ QUESTIONS 1-9: 12

## 2018-03-26 ENCOUNTER — NURSE TRIAGE (OUTPATIENT)
Dept: NURSING | Facility: CLINIC | Age: 53
End: 2018-03-26

## 2018-03-27 ENCOUNTER — TELEPHONE (OUTPATIENT)
Dept: FAMILY MEDICINE | Facility: CLINIC | Age: 53
End: 2018-03-27

## 2018-03-27 NOTE — TELEPHONE ENCOUNTER
"Caller reports she has pain below  left breast in upper abdomen when she coughs moves or the area is palpated, some mild pain all the time  Initially inquiring if this is a symptoms of CHF; denies any swelling or dyspnea  Has  Complex history with recent ED visit  Triage protocol reviewed  Advised in home care and  Advised to followup with PCP this week  Caridad Valderrama RN  FNA    Reason for Disposition    [1] MILD (e.g., does not interfere with normal activities) AND [2] pain comes and goes (cramps) AND [3] present > 48 hours    Additional Information    Negative: Shock suspected (e.g., cold/pale/clammy skin, too weak to stand, low BP, rapid pulse)    Negative: Difficult to awaken or acting confused  (e.g., disoriented, slurred speech)    Negative: Passed out (i.e., lost consciousness, collapsed and was not responding)    Negative: Sounds like a life-threatening emergency to the triager    Negative: Chest pain    Negative: Pain is mainly in upper abdomen  (if needed ask: \"is it mainly above the belly button?\")    Negative: Followed an abdomen (stomach) injury    Negative: [1] Abdominal pain AND [2] pregnant < 20 weeks    Negative: [1] Abdominal pain AND [2] pregnant > 20 weeks    Negative: [1] Abdominal pain AND [2] postpartum < 1 month since delivery    Negative: [1] SEVERE pain (e.g., excruciating) AND [2] present > 1 hour    Negative: [1] SEVERE pain AND [2] age > 60    Negative: [1] Vomiting AND [2] contains red blood or black (\"coffee ground\") material  (Exception: few red streaks in vomit that only happened once)    Negative: Blood in bowel movements   (Exception: blood on surface of BM with constipation)    Negative: Black or tarry bowel movements  (Exception: chronic-unchanged  black-grey bowel movements AND is taking iron pills or Pepto-bismol)    Negative: Patient sounds very sick or weak to the triager    Negative: [1] MILD-MODERATE pain AND [2] constant AND [3] present > 2 hours    Negative: [1] " Vomiting AND [2] abdomen looks much more swollen than usual    Negative: [1] Vomiting AND [2] contains bile (green color)    Negative: White of the eyes have turned yellow (i.e., jaundice)    Negative: Fever > 103 F (39.4 C)    Negative: [1] Fever > 101 F (38.3 C) AND [2] age > 60    Negative: [1] Fever > 101 F (38.3 C) AND [2] bedridden (e.g., nursing home patient, CVA, chronic illness, recovering from surgery)    Negative: [1] Fever > 100.5 F (38.1 C) AND [2] diabetes mellitus or weak immune system (e.g., HIV positive, cancer chemo, splenectomy, organ transplant, chronic steroids)    Protocols used: ABDOMINAL PAIN - FEMALE-ADULT-

## 2018-03-27 NOTE — PROGRESS NOTES
Hematology/ Oncology Follow-up Visit:  Mar 23, 2018    Reason for Visit:   Chief Complaint   Patient presents with     Hematology     6 month follow up Erythrocytosis/Anemia. Review lab results.        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 She was previously seen by Dr. Lomax on 10/09/2011, but has been lost from followup. The patient has had erythrocytosis with a hemoglobin around 20-23 and hemoglobin and hematocrit of 60-65% for most of her life. She had a previous workup by Dr. Lomax and also by another hematologist at [facility]. Review of her previous tests and workup showed a hemoglobin electrophoresis on 01/17/2007 that showed a hemoglobin A1 of 52%, A2 at 0, hemoglobin F at 1.4% and other hemoglobin 46.2%. JAK2 was negative on 07/31/2007. Hemoglobin affinity testing on 07/31/2007 showed a P50 of 27 (normal between 25 and 29). Repeat hemoglobin electrophoresis on 10/04/2011 showed the hemoglobin was at 39.9%. This illuminated hemoglobin was suggestive of beta globin variant of hemoglobin Hb [place]. She was also seen by Dr. Peres in 2007 and most of the workup is from there. Erythropoietin level was in the range of the 80s. Vitamin B12 and folic acid were within normal limits. Abdomen ultrasound did not show hepatosplenomegaly, but there is fatty infiltration of the liver. According to the chart, the patient had phlebotomies done in the past to keep her hemoglobin below 20 g/dl. This was done over the summertime of 2011. Most recently she didn't have any phlebotomy. She was started on anticoagulation with Coumadin as she was felt to be at high risk for blood clots and given history of severe  arterial disease and stroke in the past. The patient is safe to use her anticoagulation and her most recent INR was 1.74. Evidently her carbon monoxide is also elevated. She is an active smoker, but she is cutting down from 2 packs per day to 3 cigarettes a day over the last one month. Patient denies any DVT or PE. She also evidently claims that her mother and her first son are also having the same hemoglobinopathy and are being followed by their physicians. Bone marrow biopsy on 11/17/2014 showed no clonal chromosomal abnormality and no cytogenetic evidence of a malignant process. She also tested negative for the JAK2 mutation. Leukemia lymphoma evaluation additionally showed no aberrant immunophenotype on T cells and no increase in or abnormal appearing myeloid blasts.       Interval History:  Patient seen today for follow-up.  She has been feeling well without any recent complaints weight loss night sweats fever or chills.  She denies any nausea vomiting or diarrhea.  She denies any recent weight loss.    Review Of Systems:  Constitutional: Negative for fever, chills, and night sweats.  Skin: negative.  Eyes: negative.  Ears/Nose/Throat: negative.  Respiratory: No shortness of breath, dyspnea on exertion, cough, or hemoptysis.  Cardiovascular: negative.  Gastrointestinal: negative.  Genitourinary: negative.  Musculoskeletal: negative.  Neurologic: negative.  Psychiatric: negative.  Hematologic/Lymphatic/Immunologic: negative.  Endocrine: negative.    All other ROS negative unless mentioned in interval history.    Past medical, social, surgical, and family histories reviewed.    Allergies:  Allergies as of 03/23/2018 - Rylan as Reviewed 03/23/2018   Allergen Reaction Noted     Darvocet [propoxyphene n-apap] Anaphylaxis 07/26/2006     Percocet [oxycodone-acetaminophen] Anaphylaxis, Hives, and Swelling 06/01/2016     Asa [aspirin] Hives 07/10/2006     Bee  03/26/2010     Lyrica [pregabalin] Other (See Comments) and  Swelling 12/04/2015     Povidone iodine Rash 01/25/2017     Tape [adhesive tape] Rash 08/03/2006       Current Medications:  Current Outpatient Prescriptions   Medication Sig Dispense Refill     predniSONE (DELTASONE) 20 MG tablet Take 3 tabs (60 mg) by mouth daily x 3 days, 2 tabs (40 mg) daily x 3 days, 1 tab (20 mg) daily x 3 18 tablet 0     ondansetron (ZOFRAN) 4 MG tablet Take 1 tablet (4 mg) by mouth every 8 hours as needed for nausea 10 tablet 0     gabapentin (NEURONTIN) 600 MG tablet TAKE ONE TABLET BY MOUTH THREE TIMES DAILY 90 tablet 0     ipratropium - albuterol 0.5 mg/2.5 mg/3 mL (DUONEB) 0.5-2.5 (3) MG/3ML neb solution Take 1 vial (3 mLs) by nebulization every 6 hours as needed for shortness of breath / dyspnea or wheezing 30 vial 1     fluticasone (FLONASE) 50 MCG/ACT spray Spray 1-2 sprays into both nostrils daily 1 Bottle 11     buPROPion (WELLBUTRIN XL) 150 MG 24 hr tablet TAKE  ONE TABLET BY MOUTH EVERY DAY IN THE MORNING 30 tablet 0     furosemide (LASIX) 20 MG tablet TAKE ONE TABLET BY MOUTH TWICE DAILY 60 tablet 0     traZODone (DESYREL) 150 MG tablet TAKE ONE TABLET BY MOUTH AT BEDTIME 30 tablet 0     warfarin (COUMADIN) 5 MG tablet 12.5 mg on Wed and Sat 10 mg all other days 180 tablet 2     metoclopramide (REGLAN) 10 MG tablet Take 1 tablet (10 mg) by mouth 4 times daily as needed 40 tablet 0     albuterol (PROAIR HFA/PROVENTIL HFA/VENTOLIN HFA) 108 (90 BASE) MCG/ACT Inhaler Inhale 2 puffs into the lungs every 6 hours as needed for shortness of breath / dyspnea or wheezing 3 Inhaler 1     omeprazole (PRILOSEC) 20 MG CR capsule TAKE 1 CAPSULE (20 MG) BY MOUTH 2 TIMES DAILY 180 capsule 1     albuterol (2.5 MG/3ML) 0.083% neb solution Take 1 vial (2.5 mg) by nebulization every 6 hours as needed for shortness of breath / dyspnea or wheezing 1 Box 0     levothyroxine (SYNTHROID/LEVOTHROID) 150 MCG tablet TAKE ONE TABLET BY MOUTH DAILY 90 tablet 2     allopurinol (ZYLOPRIM) 100 MG tablet TAKE ONE  "TABLET BY MOUTH ONE TIME DAILY 90 tablet 2     lamoTRIgine (LAMICTAL) 200 MG tablet Take 1 tablet (200 mg) by mouth daily 90 tablet 3     ARIPiprazole (ABILIFY) 5 MG tablet Take 1.5 tablets (7.5 mg) by mouth At Bedtime 180 tablet 3     mometasone-formoterol (DULERA) 100-5 MCG/ACT oral inhaler Inhale 2 puffs into the lungs 2 times daily 13 g 11     simvastatin (ZOCOR) 20 MG tablet TAKE ONE TABLET BY MOUTH AT BEDTIME 90 tablet 3     order for DME Equipment being ordered: CPAP  AIRSENSE 10  5-18 CM H20  SN# [id number]   DN# [id number]       order for DME Equipment being ordered: [id number]\" x2pair     \"20-30mmHg [place] Hybrid Liners\" x 2 pair 2 Units 11     order for DME Equipment being ordered: DEPENDS SIZE LARGE 60 Month 5     order for DME Equipment being ordered: INCONTINENCE PADS   QID 1 Month 11     acetaminophen (TYLENOL) 500 MG tablet Take 500-1,000 mg by mouth every 6 hours as needed for mild pain       calcium citrate-vitamin D (CITRACAL) 315-250 MG-UNIT TABS per tablet Take 2 tablets by mouth daily       EPINEPHrine 0.3 MG/0.3ML injection Inject 0.3 mLs (0.3 mg) into the muscle once as needed for anaphylaxis (Patient not taking: Reported on 3/23/2018) 0.6 mL 0        Physical Exam:  /55 (BP Location: Right arm, Patient Position: Sitting, Cuff Size: Adult Large)  Pulse 89  Temp 98.2  F (36.8  C) (Tympanic)  Resp 18  Ht 1.499 m (4' 11\")  Wt 102 kg (224 lb 14.4 oz)  LMP 09/27/2009  SpO2 94%  Breastfeeding? No  BMI 45.42 kg/m2  Wt Readings from Last 12 Encounters:   03/23/18 102 kg (224 lb 14.4 oz)   03/22/18 102 kg (224 lb 13.9 oz)   03/14/18 102 kg (224 lb 14.4 oz)   12/01/17 100.2 kg (221 lb)   11/13/17 99.9 kg (220 lb 3.2 oz)   11/08/17 100 kg (220 lb 8 oz)   11/07/17 99.3 kg (219 lb)   10/25/17 99.3 kg (219 lb)   10/19/17 102.9 kg (226 lb 14.4 oz)   10/17/17 100.9 kg (222 lb 6.4 oz)   09/22/17 102.2 kg (225 lb 3.2 oz)   07/10/17 104.2 kg (229 lb 11.2 oz)     GENERAL APPEARANCE: Healthy, alert " and in no acute distress.  HEENT: Sclerae anicteric. PERRLA. Oropharynx without ulcers, lesions, or thrush.  NECK: Supple. No asymmetry or masses.  LYMPHATICS: No palpable cervical, supraclavicular, axillary, or inguinal lymphadenopathy.  RESP: Lungs clear to auscultation bilaterally without rales, rhonchi or wheezes.  CARDIOVASCULAR: Regular rate and rhythm. Normal S1, S2; no S3 or S4. No murmur, gallop, or rub.  ABDOMEN: Soft, nontender. Bowel sounds normal. No palpable organomegaly or masses.  MUSCULOSKELETAL: Extremities without gross deformities noted. No edema of bilateral lower extremities.  SKIN: No suspicious lesions or rashes.  NEURO: Alert and oriented x 3. Cranial nerves II-XII grossly intact.  PSYCHIATRIC: Mentation and affect appear normal.    Laboratory/Imaging Studies:  Orders Only on 03/23/2018   Component Date Value Ref Range Status     INR 03/23/2018 3.66* 0.86 - 1.14 Final    Special tube used to correct for high hematocrit     Hematocrit 03/23/2018 64.4* 35.0 - 47.0 % Final     Hemoglobin 03/23/2018 19.8* 11.7 - 15.7 g/dL Final   Orders Only on 03/16/2018   Component Date Value Ref Range Status     INR 03/16/2018 2.86* 0.86 - 1.14 Final    Special tube used to correct for high hematocrit     Hematocrit 03/16/2018 57.2* 35.0 - 47.0 % Final     Hemoglobin 03/16/2018 18.2* 11.7 - 15.7 g/dL Final   Office Visit on 03/14/2018   Component Date Value Ref Range Status     WBC 03/14/2018 5.4  4.0 - 11.0 10e9/L Final     RBC Count 03/14/2018 6.74* 3.8 - 5.2 10e12/L Final     Hemoglobin 03/14/2018 19.3* 11.7 - 15.7 g/dL Final     Hematocrit 03/14/2018 60.0* 35.0 - 47.0 % Final     MCV 03/14/2018 89  78 - 100 fl Final     MCH 03/14/2018 28.6  26.5 - 33.0 pg Final     MCHC 03/14/2018 32.2  31.5 - 36.5 g/dL Final     RDW 03/14/2018 21.7* 10.0 - 15.0 % Final     Platelet Count 03/14/2018 156  150 - 450 10e9/L Final     Diff Method 03/14/2018 Automated Method   Final     % Neutrophils 03/14/2018 52.8  % Final      % Lymphocytes 03/14/2018 34.1  % Final     % Monocytes 03/14/2018 11.0  % Final     % Eosinophils 03/14/2018 1.5  % Final     % Basophils 03/14/2018 0.2  % Final     % Immature Granulocytes 03/14/2018 0.4  % Final     Absolute Neutrophil 03/14/2018 2.8  1.6 - 8.3 10e9/L Final     Absolute Lymphocytes 03/14/2018 1.8  0.8 - 5.3 10e9/L Final     Absolute Monocytes 03/14/2018 0.6  0.0 - 1.3 10e9/L Final     Absolute Eosinophils 03/14/2018 0.1  0.0 - 0.7 10e9/L Final     Absolute Basophils 03/14/2018 0.0  0.0 - 0.2 10e9/L Final     Abs Immature Granulocytes 03/14/2018 0.0  0 - 0.4 10e9/L Final     Sodium 03/14/2018 140  133 - 144 mmol/L Final     Potassium 03/14/2018 3.6  3.4 - 5.3 mmol/L Final     Chloride 03/14/2018 103  94 - 109 mmol/L Final     Carbon Dioxide 03/14/2018 29  20 - 32 mmol/L Final     Anion Gap 03/14/2018 8  3 - 14 mmol/L Final     Glucose 03/14/2018 101* 70 - 99 mg/dL Final    Fasting specimen     Urea Nitrogen 03/14/2018 10  7 - 30 mg/dL Final     Creatinine 03/14/2018 0.81  0.52 - 1.04 mg/dL Final     GFR Estimate 03/14/2018 75  >60 mL/min/1.7m2 Final    Non  GFR Calc     GFR Estimate If Black 03/14/2018 >90  >60 mL/min/1.7m2 Final    African American GFR Calc     Calcium 03/14/2018 8.3* 8.5 - 10.1 mg/dL Final     Bilirubin Total 03/14/2018 0.4  0.2 - 1.3 mg/dL Final     Albumin 03/14/2018 3.3* 3.4 - 5.0 g/dL Final     Protein Total 03/14/2018 6.2* 6.8 - 8.8 g/dL Final     Alkaline Phosphatase 03/14/2018 100  40 - 150 U/L Final     ALT 03/14/2018 27  0 - 50 U/L Final     AST 03/14/2018 16  0 - 45 U/L Final     Influenza A/B Agn Specimen 03/14/2018 Nasal   Final     Influenza A 03/14/2018 Negative  NEG^Negative Final     Influenza B 03/14/2018 Negative  NEG^Negative Final    Comment: Test results must be correlated with clinical data. If necessary, results   should be confirmed by a molecular assay or viral culture.          Recent Results (from the past 744 hour(s))   CT Abdomen  Pelvis w Contrast    Narrative    CT ABDOMEN AND PELVIS WITH CONTRAST 3/7/2018 8:52 PM     HISTORY: Right lower quadrant abdominal pain.    COMPARISON: 10/18/2017    TECHNIQUE: Volumetric helical acquisition of CT images from the lung  bases through the symphysis pubis after the administration of 100 mL  Isovue-370 intravenous contrast. Radiation dose for this scan was  reduced using automated exposure control, adjustment of the mA and/or  kV according to patient size, or iterative reconstruction technique.    FINDINGS: The liver, adrenal glands, pancreas, and spleen demonstrate  no worrisome focal lesion. Normal appendix. At least moderate fatty  infiltration of the liver. Unremarkable gallbladder. A few low-dense  lesions are seen in the kidneys which are too small to definitively  characterize and therefore indeterminate, but based on prevalence in a  patient without a known malignancy are most likely small cysts. There  is no pelvic free fluid. Hysterectomy changes. No hydronephrosis. No  diverticulitis. No free air in the abdomen. Bone windows reveal no  destructive lesions. There are no abdominal or pelvic lymph nodes that  are abnormal by size criteria. The visualized lung bases trace  peripheral fibrosis and/or atelectasis at the lung bases. There are no  dilated loops of small bowel or colon.      Impression    IMPRESSION: No acute process demonstrated within the abdomen and  pelvis.    JUDE DAWSON MD   Chest XR,  PA & LAT    Narrative    CHEST 2 VIEWS  3/7/2018 11:53 PM     HISTORY: Cough.    COMPARISON: 10/19/2017.    FINDINGS: Increased pulmonary vascularity. No convincing pulmonary  opacities. Mild cardiomegaly again noted. Atherosclerotic  calcification in the thoracic aorta.      Impression    IMPRESSION:  1. Increased pulmonary vascularity, suggestive of pulmonary vascular  congestion.  2. No other findings suspicious for active cardiopulmonary disease.    MEERA WILLIAMSON MD   XR Chest 2 Views     Narrative    XR CHEST 2 VW 3/14/2018 3:42 PM    HISTORY: COPD. Cough and wheezing.    COMPARISON: 3/7/2018    FINDINGS: Prominence of the pulmonary interstitium. No lobar  consolidation. No pleural effusion or pneumothorax. Cardiomegaly is  unchanged.      Impression    IMPRESSION: Mild vascular congestion.    EVELYN CALVO MD   Chest XR,  PA & LAT    Narrative    XR CHEST 2 VW 3/22/2018 1:17 PM    HISTORY: Short of breath.    COMPARISON: Several prior studies, the most recent one being dated  3/14/2018.      Impression    IMPRESSION: 2 views of the chest show stable mild cardiomegaly.  Pulmonary vascular congestion is again seen but there is no karine CHF.  There is no evidence of pneumonia.    KAMLA MUSA MD       Assessment and plan:  (D75.1) Erythrocytosis  (primary encounter diagnosis)  I reviewed with the patient today most recent CBC.  Her hematocrit has been stable.  The patient currently is symptomatic.    I will see her again in 6 months time or sooner if there are new developments or concerns.    (E03.9) Hypothyroidism, unspecified type  Patient currently on Zocor 20 mg orally daily.    (I10) HTN, goal below 140/90  Patient blood pressure is currently well-controlled  (J44.9) Chronic obstructive pulmonary disease, unspecified COPD type (H)    (J45.30) Mild persistent asthma without complication  Patient symptoms has been stable.  She is using albuterol inhaler and DuoNeb patient blood pressure is well controlled.  She is currently on nebulizer if needed.    The patient is ready to learn, no apparent learning barriers were identified.  Diagnosis and treatment plans were explained to the patient. The patient expressed understanding of the content. The patient asked appropriate questions. The patient questions were answered to her satisfaction.    Chart documentation with Dragon Voice recognition Software. Although reviewed after completion, some words and grammatical errors may remain.

## 2018-03-27 NOTE — ASSESSMENT & PLAN NOTE
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 She was previously seen by Dr. Lomax on 10/09/2011, but has been lost from followup. The patient has had erythrocytosis with a hemoglobin around 20-23 and hemoglobin and hematocrit of 60-65% for most of her life. She had a previous workup by Dr. Lomax and also by another hematologist at [facility]. Review of her previous tests and workup showed a hemoglobin electrophoresis on 01/17/2007 that showed a hemoglobin A1 of 52%, A2 at 0, hemoglobin F at 1.4% and other hemoglobin 46.2%. JAK2 was negative on 07/31/2007. Hemoglobin affinity testing on 07/31/2007 showed a P50 of 27 (normal between 25 and 29). Repeat hemoglobin electrophoresis on 10/04/2011 showed the hemoglobin was at 39.9%. This illuminated hemoglobin was suggestive of beta globin variant of hemoglobin Hb [place]. She was also seen by Dr. Peres in 2007 and most of the workup is from there. Erythropoietin level was in the range of the 80s. Vitamin B12 and folic acid were within normal limits. Abdomen ultrasound did not show hepatosplenomegaly, but there is fatty infiltration of the liver. According to the chart, the patient had phlebotomies done in the past to keep her hemoglobin below 20 g/dl. This was done over the summertime of 2011. Most recently she didn't have any phlebotomy. She was started on anticoagulation with Coumadin as she was felt to be at high risk for blood clots and given history of severe arterial disease and stroke in the past. The patient is safe to use her anticoagulation and her most recent INR was 1.74. Evidently her carbon monoxide is also elevated. She is an active smoker, but she is cutting down from 2 packs per day to  3 cigarettes a day over the last one month. Patient denies any DVT or PE. She also evidently claims that her mother and her first son are also having the same hemoglobinopathy and are being followed by their physicians. Bone marrow biopsy on 11/17/2014 showed no clonal chromosomal abnormality and no cytogenetic evidence of a malignant process. She also tested negative for the JAK2 mutation. Leukemia lymphoma evaluation additionally showed no aberrant immunophenotype on T cells and no increase in or abnormal appearing myeloid blasts.

## 2018-03-27 NOTE — TELEPHONE ENCOUNTER
Reason for call:  Patient reporting a symptom    Symptom or request: Patient calling stating she's been sick for 3 weeks. She's been seen for this and given antibiotics but she's still coughing up yellow phlegm.     Patient states she also can't keep down red meat or chicken but she can keep down fish. She's wondering why this could be?    Duration (how long have symptoms been present): 3 weeks    Have you been treated for this before? Yes    Additional comments:     Phone Number patient can be reached at:  Home number on file [phone] (home)    Best Time:  any    Can we leave a detailed message on this number:  YES    Call taken on 3/27/2018 at 11:35 AM by Zina Shah

## 2018-03-27 NOTE — TELEPHONE ENCOUNTER
Appointment offered for tomorrow. She declined and said that she needed to give 2 days notice for transportation. Appointment made for 3/30/18. Advised small meals frequently instead of 3 big meals.   Mundo Conde RN

## 2018-03-30 ENCOUNTER — OFFICE VISIT (OUTPATIENT)
Dept: FAMILY MEDICINE | Facility: CLINIC | Age: 53
End: 2018-03-30
Payer: COMMERCIAL

## 2018-03-30 VITALS
BODY MASS INDEX: 45.14 KG/M2 | TEMPERATURE: 98.5 F | WEIGHT: 223.9 LBS | DIASTOLIC BLOOD PRESSURE: 73 MMHG | HEART RATE: 76 BPM | OXYGEN SATURATION: 93 % | SYSTOLIC BLOOD PRESSURE: 132 MMHG | HEIGHT: 59 IN

## 2018-03-30 DIAGNOSIS — F34.1 PERSONALITY DISORDER, DEPRESSIVE: Chronic | ICD-10-CM

## 2018-03-30 DIAGNOSIS — I82.4Y9 DEEP VEIN THROMBOSIS (DVT) OF PROXIMAL LOWER EXTREMITY, UNSPECIFIED CHRONICITY, UNSPECIFIED LATERALITY (H): Chronic | ICD-10-CM

## 2018-03-30 DIAGNOSIS — J44.9 CHRONIC OBSTRUCTIVE PULMONARY DISEASE, UNSPECIFIED COPD TYPE (H): Chronic | ICD-10-CM

## 2018-03-30 DIAGNOSIS — F17.200 SMOKER: ICD-10-CM

## 2018-03-30 DIAGNOSIS — J44.1 COPD EXACERBATION (H): Primary | ICD-10-CM

## 2018-03-30 DIAGNOSIS — E03.9 HYPOTHYROIDISM, UNSPECIFIED TYPE: Chronic | ICD-10-CM

## 2018-03-30 PROCEDURE — 99214 OFFICE O/P EST MOD 30 MIN: CPT | Performed by: FAMILY MEDICINE

## 2018-03-30 RX ORDER — LEVOFLOXACIN 750 MG/1
750 TABLET, FILM COATED ORAL DAILY
Qty: 10 TABLET | Refills: 0 | Status: SHIPPED | OUTPATIENT
Start: 2018-03-30 | End: 2018-05-02

## 2018-03-30 RX ORDER — PREDNISONE 20 MG/1
TABLET ORAL
Qty: 24 TABLET | Refills: 0 | Status: SHIPPED | OUTPATIENT
Start: 2018-03-30 | End: 2018-05-02

## 2018-03-30 NOTE — PROGRESS NOTES
"  SUBJECTIVE:   Bia Bill is a 52 year old female who presents to clinic today for the following health issues:      ENT Symptoms             Symptoms: cc Present Absent Comment   Fever/Chills  x  Chills    Fatigue  x     Muscle Aches  x     Eye Irritation   x    Sneezing  x     Nasal Danny/Drg  x  Nasal congestion    Sinus Pressure/Pain   x    Loss of smell  x     Dental pain   x    Sore Throat  x  From coughing    Swollen Glands   x    Ear Pain/Fullness       Cough  x     Wheeze  x     Chest Pain  x  Chest congestion    Shortness of breath  x     Rash   x    Other         Symptom duration:  past month    Symptom severity:  Severe    Treatments tried:  Prednisone    Contacts:       Cold symptoms - coughing often  shortness of breath   No fever     Copd - daily dulera    Seen in clinic on 3/14/18 - had cxr  - \"mild congestion\"   Doxy,prednisone, duonebs  Cbc, cmp - normal     3/22/18 - copd exac   Normal bnp     hemoglobin 20.6, which she has had before and 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  There is mild concentric left ventricular hypertrophy.  Left ventricular systolic function is normal.  The visual ejection fraction is estimated at 60-65%.  The right ventricle is normal in structure, function and size.    Trying to use cpap machine - intermittent    Review of systems:  Normal appetite  No vomiting or diarrhea     Problem list and histories reviewed & adjusted, as indicated.  Additional history: as documented    Patient Active Problem List   Diagnosis     Mild persistent asthma     Polyneuropathy in other diseases classified elsewhere (H)     DVT (deep venous " thrombosis) (H)     Alcohol abuse, in remission     SECONDARY POLYCYTHEMIA     Chondromalacia of patella     Sensorineural hearing loss, asymmetrical     Developmental reading disorder     Esophageal reflux     Hypothyroidism     Fatty liver     GILES (Obstructive Sleep Apnea)-Moderate (AHI 16)     RLS (restless legs syndrome)     Smoker     Erythrocytosis     Moderate mixed bipolar I disorder (H)     Personality disorder, depressive     COPD (chronic obstructive pulmonary disease) (H)     Current use of long term anticoagulation     Rosacea     Health Care Home     DDD (degenerative disc disease), cervical     Benign neoplasm of colon (POLYPOSIS)     Stroke (H)     Somatization disorder     Sebaceous cyst of right axilla     HTN, goal below 140/90     Left leg pain     Vitamin D deficiency     Long term current use of anticoagulant therapy     Morbid obesity (H)     PVD (peripheral vascular disease) with claudication (H)     Cyst of left ovary     Morbid obesity with alveolar hypoventilation (H)     GI bleed     Severe episode of recurrent major depressive disorder (H)     Current Outpatient Prescriptions   Medication     predniSONE (DELTASONE) 20 MG tablet     gabapentin (NEURONTIN) 600 MG tablet     ipratropium - albuterol 0.5 mg/2.5 mg/3 mL (DUONEB) 0.5-2.5 (3) MG/3ML neb solution     fluticasone (FLONASE) 50 MCG/ACT spray     buPROPion (WELLBUTRIN XL) 150 MG 24 hr tablet     furosemide (LASIX) 20 MG tablet     calcium citrate-vitamin D (CITRACAL) 315-250 MG-UNIT TABS per tablet     traZODone (DESYREL) 150 MG tablet     warfarin (COUMADIN) 5 MG tablet     metoclopramide (REGLAN) 10 MG tablet     albuterol (PROAIR HFA/PROVENTIL HFA/VENTOLIN HFA) 108 (90 BASE) MCG/ACT Inhaler     omeprazole (PRILOSEC) 20 MG CR capsule     albuterol (2.5 MG/3ML) 0.083% neb solution     levothyroxine (SYNTHROID/LEVOTHROID) 150 MCG tablet     allopurinol (ZYLOPRIM) 100 MG tablet     lamoTRIgine (LAMICTAL) 200 MG tablet     ARIPiprazole  "(ABILIFY) 5 MG tablet     mometasone-formoterol (DULERA) 100-5 MCG/ACT oral inhaler     simvastatin (ZOCOR) 20 MG tablet     acetaminophen (TYLENOL) 500 MG tablet     ondansetron (ZOFRAN) 4 MG tablet     EPINEPHrine 0.3 MG/0.3ML injection     order for DME     order for DME     order for DME     order for DME     No current facility-administered medications for this visit.         Allergies   Allergen Reactions     Darvocet [Propoxyphene N-Apap] Anaphylaxis     Darvocet,Percocet      Percocet [Oxycodone-Acetaminophen] Anaphylaxis, Hives and Swelling     Has tolerated hydromorphone in the past.      Asa [Aspirin] Hives     Aspirin causes seizures and hives, throat swelling.   Has tolerated ketorolac in the past.        Bee      Ibuprofen Swelling     Throat swelling per patient      Lyrica [Pregabalin] Other (See Comments) and Swelling     dizziness     Povidone Iodine Rash     Reaction to topical betadine     Tape [Adhesive Tape] Rash       /73 (BP Location: Right arm, Patient Position: Sitting, Cuff Size: Adult Large)  Pulse 76  Temp 98.5  F (36.9  C) (Tympanic)  Ht 4' 11\" (1.499 m)  Wt 223 lb 14.4 oz (101.6 kg)  LMP 09/27/2009  SpO2 93%  BMI 45.22 kg/m2   Smells strongly of smoke   GENERAL - Pt is alert and oriented in no acute distress.  Affect is appropriate. Good eye contact.  HEET - Head is normocephalic, atraumatic.    PERRLA,EEMI. Conjunctiva are free of icterus or erythema.    TMs bilaterally normal.    Oropharynx free of masses and lesions, no tonsillar exudate or petechiae.    NECK - Neck is supple w/o LA or thyromegaly  RESPIRATORY - Clear to auscultation bilaterally.  No wheezing noted  CV - RRR, no murmurs, rubs, gallops.     Assessment/Plan -  (J44.1) COPD exacerbation (H)  (primary encounter diagnosis)  Comment: I think this is likely copd exacerbation. Switch to stronger antibiotic. Needs pfts once she is well. Higher dose of prednisone given and discussed. Increase dulera to 200/5. The " patient indicates understanding of these issues and agrees with the plan.   Plan: Asthma Action Plan (AAP), mometasone-formoterol        (DULERA) 200-5 MCG/ACT oral inhaler,         Spirometry, Breath Capacity:  Future Order, RT         Performed, levofloxacin (LEVAQUIN) 750 MG         tablet, predniSONE (DELTASONE) 20 MG tablet,         order for DME            (J44.9) Chronic obstructive pulmonary disease, unspecified COPD type (H)  Comment:   Plan: mometasone-formoterol (DULERA) 200-5 MCG/ACT         oral inhaler, Spirometry, Breath Capacity:          Future Order, RT Performed, levofloxacin         (LEVAQUIN) 750 MG tablet, predniSONE         (DELTASONE) 20 MG tablet, order for DME            (E03.9) Hypothyroidism, unspecified type  Comment: check lab   Plan: TSH with free T4 reflex, CANCELED: TSH with         free T4 reflex            (F17.200) Smoker  Comment:   Plan:     (F34.1) Personality disorder, depressive  Comment:   Plan: DEPRESSION ACTION PLAN (DAP)            (I82.4Y9) Deep vein thrombosis (DVT) of proximal lower extremity, unspecified chronicity, unspecified laterality (H)  Comment: coumadin and levaquin interaction. Needs INR in 4-5 days. The patient indicates understanding of these issues and agrees with the plan.  Plan: INR            C. Anna Magdaleno MD

## 2018-03-30 NOTE — MR AVS SNAPSHOT
After Visit Summary   3/30/2018    Bia Bill    MRN: [id number]           Patient Information     Date Of Birth          1965        Visit Information        Provider Department      3/30/2018 1:30 PM Joanna Magadleno MD [facility]        Today's Diagnoses     COPD exacerbation (H)    -  1    Chronic obstructive pulmonary disease, unspecified COPD type (H)        Hypothyroidism, unspecified type        Smoker        Personality disorder, depressive        Deep vein thrombosis (DVT) of proximal lower extremity, unspecified chronicity, unspecified laterality (H)          Care Instructions        I am increasing your dulera dose - your new dose is 200-5 . Please use this inhaler     Take the duoneb 4x/day    Use your cpap every night     Start daily levaquin and prednisone - new prescriptions sent in today     Use albuterol as a rescue inhaler     INR check on Monday  - future order in               Follow-ups after your visit        Your next 10 appointments already scheduled     Apr 03, 2018  1:00 PM CDT   Return Visit with Kd Morris   [facility])    [address] [address]   [phone]            Apr 06, 2018  1:30 PM CDT   LAB with [place] [facility]   [facility]    [address] [address]   [phone]           Please do not eat 10-12 hours before your appointment if you are coming in fasting for labs on lipids, cholesterol, or glucose (sugar). This does not apply to pregnant women. Water, hot tea and black coffee (with nothing added) are okay. Do not drink other fluids, diet soda or chew gum.            Apr 19, 2018  9:30 AM CDT   Return Visit with Kd Morris   [facility] ([facility])    [address] [address]   [phone]            Jun 22, 2018  1:00 PM CDT   LAB with [place] [facility]   [facility]    [address] [address]   [phone]           Please do not eat 10-12 hours before your appointment if you are coming in fasting for labs on lipids, cholesterol, or glucose (sugar). This does not apply to pregnant women. Water, hot tea and black coffee (with nothing added) are okay. Do not drink other fluids, diet soda or chew gum.            Sep 21, 2018  1:00 PM CDT   LAB with [facility] ([facility])    [address] [address]   [phone]           Please do not eat 10-12 hours before your appointment if you are coming in fasting for labs on lipids, cholesterol, or glucose (sugar). This does not apply to pregnant women. Water, hot tea and black coffee (with nothing added) are okay. Do not drink other fluids, diet soda or chew gum.            Sep 28, 2018  1:30 PM CDT   Return Visit with Joshua Watts MD   [facility] ([facility])    [facility]  [address]  [facility] [address]   [phone]              Future tests that were ordered for you today     Open Future Orders        Priority Expected Expires Ordered    Spirometry, Breath Capacity:  Future Order, RT Performed Routine  5/14/2018 3/30/2018    INR Routine  3/30/2019 3/30/2018            Who to contact     Normal or non-critical lab and imaging results will be communicated to you by MyChart, letter or phone within 4 business days after the clinic has received the results. If you do not hear from us within 7 days, please contact the clinic through MyChart or phone. If you have a critical or abnormal lab result, we will notify you by phone as soon as possible.  Submit refill requests through [employer] or call your pharmacy and they will forward the refill request to us. Please allow 3 business days for your refill to be completed.          If you need to speak  "with a  for additional information , please call: [phone]             Additional Information About Your Visit        [employer] Information     [employer] lets you send messages to your doctor, view your test results, renew your prescriptions, schedule appointments and more. To sign up, go to [email or web address] . Click on \"Log in\" on the left side of the screen, which will take you to the Welcome page. Then click on \"Sign up Now\" on the right side of the page.     You will be asked to enter the access code listed below, as well as some personal information. Please follow the directions to create your username and password.     Your access code is: [email or web address]  Expires: 2018  1:16 PM     Your access code will  in 90 days. If you need help or a new code, please call your [facility] clinic or [phone].        Care EveryWhere ID     This is your Care EveryWhere ID. This could be used by other organizations to access your [facility] medical records  [id number]        Your Vitals Were     Pulse Temperature Height Last Period Pulse Oximetry BMI (Body Mass Index)    76 98.5  F (36.9  C) (Tympanic) 4' 11\" (1.499 m) 2009 93% 45.22 kg/m2       Blood Pressure from Last 3 Encounters:   18 132/73   18 118/55   18 124/72    Weight from Last 3 Encounters:   18 223 lb 14.4 oz (101.6 kg)   18 224 lb 14.4 oz (102 kg)   18 224 lb 13.9 oz (102 kg)              We Performed the Following     Asthma Action Plan (AAP)     DEPRESSION ACTION PLAN (DAP)     TSH with free T4 reflex          Today's Medication Changes          These changes are accurate as of 3/30/18  2:08 PM.  If you have any questions, ask your nurse or doctor.               Start taking these medicines.        Dose/Directions    levofloxacin 750 MG tablet   Commonly known as:  LEVAQUIN   Used for:  COPD exacerbation (H), Chronic obstructive pulmonary disease, unspecified COPD type (H)   Started by:  " Joanna Magdaleno MD        Dose:  750 mg   Take 1 tablet (750 mg) by mouth daily   Quantity:  10 tablet   Refills:  0         These medicines have changed or have updated prescriptions.        Dose/Directions    * mometasone-formoterol 100-5 MCG/ACT oral inhaler   Commonly known as:  DULERA   This may have changed:  Another medication with the same name was added. Make sure you understand how and when to take each.   Used for:  Mild persistent asthma without complication   Changed by:  Joanna Magdaleno MD        Dose:  2 puff   Inhale 2 puffs into the lungs 2 times daily   Quantity:  13 g   Refills:  11       * mometasone-formoterol 200-5 MCG/ACT oral inhaler   Commonly known as:  DULERA   This may have changed:  You were already taking a medication with the same name, and this prescription was added. Make sure you understand how and when to take each.   Used for:  COPD exacerbation (H), Chronic obstructive pulmonary disease, unspecified COPD type (H)   Changed by:  Joanna Magdaleno MD        Dose:  2 puff   Inhale 2 puffs into the lungs 2 times daily   Quantity:  13 g   Refills:  1       * predniSONE 20 MG tablet   Commonly known as:  DELTASONE   This may have changed:  Another medication with the same name was added. Make sure you understand how and when to take each.   Used for:  Chronic obstructive pulmonary disease, unspecified COPD type (H)   Changed by:  Joanna Magdaleno MD        Take 3 tabs (60 mg) by mouth daily x 3 days, 2 tabs (40 mg) daily x 3 days, 1 tab (20 mg) daily x 3   Quantity:  18 tablet   Refills:  0       * predniSONE 20 MG tablet   Commonly known as:  DELTASONE   This may have changed:  You were already taking a medication with the same name, and this prescription was added. Make sure you understand how and when to take each.   Used for:  COPD exacerbation (H), Chronic obstructive pulmonary disease, unspecified COPD type (H)   Changed by:  Joanna Magdaleno MD         Take 60mg/day for 4 days, take 40mg/day for 4 days, take 20mg/day for 4 days, then stop   Quantity:  24 tablet   Refills:  0       * Notice:  This list has 4 medication(s) that are the same as other medications prescribed for you. Read the directions carefully, and ask your doctor or other care provider to review them with you.         Where to get your medicines      These medications were sent to [id number] - [place], [place] - [address] [address]    Hours:  Closed 10-16-08 business to [facility] Phone:  [phone]     levofloxacin 750 MG tablet    mometasone-formoterol 200-5 MCG/ACT oral inhaler    predniSONE 20 MG tablet                Primary Care Provider Office Phone # Fax #    Kd Leong [phone]       [facility] [address] [address] [address]        Equal Access to Services     ANTIONETTE IRBY AH: Hadii jose estrellao Sojossy, waaxda lulorenadaha, qaybta kaalmada ademilleryafrancis, nitin roberts . So [facility] [phone].    ATENCIÓN: Si khaila español, tiene a terry disposición servicios gratuitos de asistencia lingüística. Llame al [phone].    We comply with applicable federal civil rights laws and [place] laws. We do not discriminate on the basis of race, color, national origin, age, disability, sex, sexual orientation, or gender identity.            Thank you!     Thank you for choosing [facility] (formerly [facility]3]  for your care. Our goal is always to provide you with excellent care. Hearing back from our patients is one way we can continue to improve our services. Please take a few minutes to complete the written survey that you may receive in the mail after your visit with us. Thank you!             Your Updated Medication List - Protect others around you: Learn how to safely use, store and throw away your medicines at www.disposemymeds.org.          This list is accurate as of 3/30/18  2:08 PM.   Always use your most recent med list.                   Brand Name Dispense Instructions for use Diagnosis    acetaminophen 500 MG tablet    TYLENOL     Take 500-1,000 mg by mouth every 6 hours as needed for mild pain        * albuterol 108 (90 BASE) MCG/ACT Inhaler    PROAIR HFA/PROVENTIL HFA/VENTOLIN HFA    3 Inhaler    Inhale 2 puffs into the lungs every 6 hours as needed for shortness of breath / dyspnea or wheezing    Mild persistent asthma without complication       * albuterol (2.5 MG/3ML) 0.083% neb solution     1 Box    Take 1 vial (2.5 mg) by nebulization every 6 hours as needed for shortness of breath / dyspnea or wheezing    Mild persistent asthma without complication       allopurinol 100 MG tablet    ZYLOPRIM    90 tablet    TAKE ONE TABLET BY MOUTH ONE TIME DAILY    Acute gouty arthritis       ARIPiprazole 5 MG tablet    ABILIFY    180 tablet    Take 1.5 tablets (7.5 mg) by mouth At Bedtime    Major depressive disorder, recurrent episode, moderate (H)       buPROPion 150 MG 24 hr tablet    WELLBUTRIN XL    30 tablet    TAKE  ONE TABLET BY MOUTH EVERY DAY IN THE MORNING    COPD exacerbation (H)       calcium citrate-vitamin D 315-250 MG-UNIT Tabs per tablet    CITRACAL     Take 2 tablets by mouth daily        EPINEPHrine 0.3 MG/0.3ML injection 2-pack    EPIPEN/ADRENACLICK/or ANY BX GENERIC EQUIV    0.6 mL    Inject 0.3 mLs (0.3 mg) into the muscle once as needed for anaphylaxis    Anaphylactic reaction to bee sting, undetermined intent, subsequent encounter       fluticasone 50 MCG/ACT spray    FLONASE    1 Bottle    Spray 1-2 sprays into both nostrils daily    Nasal congestion       furosemide 20 MG tablet    LASIX    60 tablet    TAKE ONE TABLET BY MOUTH TWICE DAILY    Lymphedema of both lower extremities       gabapentin 600 MG tablet    NEURONTIN    90 tablet    TAKE ONE TABLET BY MOUTH THREE TIMES DAILY    Polyneuropathy in other diseases classified elsewhere (H)       ipratropium - albuterol 0.5  mg/2.5 mg/3 mL 0.5-2.5 (3) MG/3ML neb solution    DUONEB    30 vial    Take 1 vial (3 mLs) by nebulization every 6 hours as needed for shortness of breath / dyspnea or wheezing    Chronic obstructive pulmonary disease, unspecified COPD type (H)       lamoTRIgine 200 MG tablet    LaMICtal    90 tablet    Take 1 tablet (200 mg) by mouth daily    Major depressive disorder, recurrent episode, moderate (H)       levofloxacin 750 MG tablet    LEVAQUIN    10 tablet    Take 1 tablet (750 mg) by mouth daily    COPD exacerbation (H), Chronic obstructive pulmonary disease, unspecified COPD type (H)       levothyroxine 150 MCG tablet    SYNTHROID/LEVOTHROID    90 tablet    TAKE ONE TABLET BY MOUTH DAILY    Hypothyroidism, unspecified type       metoclopramide 10 MG tablet    REGLAN    40 tablet    Take 1 tablet (10 mg) by mouth 4 times daily as needed    Flatulence, eructation, and gas pain       * mometasone-formoterol 100-5 MCG/ACT oral inhaler    DULERA    13 g    Inhale 2 puffs into the lungs 2 times daily    Mild persistent asthma without complication       * mometasone-formoterol 200-5 MCG/ACT oral inhaler    DULERA    13 g    Inhale 2 puffs into the lungs 2 times daily    COPD exacerbation (H), Chronic obstructive pulmonary disease, unspecified COPD type (H)       omeprazole 20 MG CR capsule    priLOSEC    180 capsule    TAKE 1 CAPSULE (20 MG) BY MOUTH 2 TIMES DAILY    Gastroesophageal reflux disease without esophagitis       ondansetron 4 MG tablet    ZOFRAN    10 tablet    Take 1 tablet (4 mg) by mouth every 8 hours as needed for nausea    Nausea and vomiting, intractability of vomiting not specified, unspecified vomiting type       * order for DME      Equipment being ordered: CPAP AIRSENSE 10 5-18 CM H20 # [id number]   DN# [id number]        * order for DME     1 Month    Equipment being ordered: INCONTINENCE PADS  QID    Other urinary incontinence       * order for DME     60 Month    Equipment being ordered: DEPENDS  "SIZE LARGE    Mixed incontinence       * order for DME     2 Units    Equipment being ordered: [id number]\" x2pair   \"20-30mmHg F[employer] Hybrid Liners\" x 2 pair    Peripheral edema       * predniSONE 20 MG tablet    DELTASONE    18 tablet    Take 3 tabs (60 mg) by mouth daily x 3 days, 2 tabs (40 mg) daily x 3 days, 1 tab (20 mg) daily x 3    Chronic obstructive pulmonary disease, unspecified COPD type (H)       * predniSONE 20 MG tablet    DELTASONE    24 tablet    Take 60mg/day for 4 days, take 40mg/day for 4 days, take 20mg/day for 4 days, then stop    COPD exacerbation (H), Chronic obstructive pulmonary disease, unspecified COPD type (H)       simvastatin 20 MG tablet    ZOCOR    90 tablet    TAKE ONE TABLET BY MOUTH AT BEDTIME    Hypercholesteremia       traZODone 150 MG tablet    DESYREL    30 tablet    TAKE ONE TABLET BY MOUTH AT BEDTIME    Major depressive disorder, recurrent episode, moderate (H)       warfarin 5 MG tablet    COUMADIN    180 tablet    12.5 mg on Wed and Sat 10 mg all other days    DVT (deep venous thrombosis) (H)       * Notice:  This list has 10 medication(s) that are the same as other medications prescribed for you. Read the directions carefully, and ask your doctor or other care provider to review them with you.      "

## 2018-03-30 NOTE — PATIENT INSTRUCTIONS
I am increasing your dulera dose - your new dose is 200-5 . Please use this inhaler     Take the duoneb 4x/day    Use your cpap every night     Start daily levaquin and prednisone - new prescriptions sent in today     Use albuterol as a rescue inhaler     INR check on Monday  - future order in

## 2018-03-30 NOTE — LETTER
My Asthma Action Plan  Name: Bia Bill   YOB: 1965  Date: 3/30/2018   My doctor: Joanna Magdaleno MD   My clinic: [facility]        My Control Medicine: Dulera   My Rescue Medicine: Albuterol   My Asthma Severity: mild persistent  Avoid your asthma triggers:               GREEN ZONE   Good Control    I feel good    No cough or wheeze    Can work, sleep and play without asthma symptoms       Take your asthma control medicine every day.     1. If exercise triggers your asthma, take your rescue medication    15 minutes before exercise or sports, and    During exercise if you have asthma symptoms  2. Spacer to use with inhaler: If you have a spacer, make sure to use it with your inhaler             YELLOW ZONE Getting Worse  I have ANY of these:    I do not feel good    Cough or wheeze    Chest feels tight    Wake up at night   1. Keep taking your Green Zone medications  2. Start taking your rescue medicine:    every 20 minutes for up to 1 hour. Then every 4 hours for 24-48 hours.  3. If you stay in the Yellow Zone for more than 12-24 hours, contact your doctor.  4. If you do not return to the Green Zone in 12-24 hours or you get worse, start taking your oral steroid medicine if prescribed by your provider.           RED ZONE Medical Alert - Get Help  I have ANY of these:    I feel awful    Medicine is not helping    Breathing getting harder    Trouble walking or talking    Nose opens wide to breathe       1. Take your rescue medicine NOW  2. If your provider has prescribed an oral steroid medicine, start taking it NOW  3. Call your doctor NOW  4. If you are still in the Red Zone after 20 minutes and you have not reached your doctor:    Take your rescue medicine again and    Call 911 or go to the emergency room right away    See your regular doctor within 2 weeks of an Emergency Room or Urgent Care visit for follow-up treatment.          Annual Reminders:  Meet with Asthma  Educator,  Flu Shot in the Fall, consider Pneumonia Vaccination for patients with asthma (aged 19 and older).    Pharmacy:    [facility], [place] - [address] [id number] - [place], [place] - [address]  [facility], [place] - [address] [place] [address]                      Asthma Triggers  How To Control Things That Make Your Asthma Worse    Triggers are things that make your asthma worse.  Look at the list below to help you find your triggers and what you can do about them.  You can help prevent asthma flare-ups by staying away from your triggers.      Trigger                                                          What you can do   Cigarette Smoke  Tobacco smoke can make asthma worse. Do not allow smoking in your home, car or around you.  Be sure no one smokes at a child s day care or school.  If you smoke, ask your health care provider for ways to help you quit.  Ask family members to quit too.  Ask your health care provider for a referral to Quit Plan to help you quit smoking, or call 6-003-738-PLAN.     Colds, Flu, Bronchitis  These are common triggers of asthma. Wash your hands often.  Don t touch your eyes, nose or mouth.  Get a flu shot every year.     Dust Mites  These are tiny bugs that live in cloth or carpet. They are too small to see. Wash sheets and blankets in hot water every week.   Encase pillows and mattress in dust mite proof covers.  Avoid having carpet if you can. If you have carpet, vacuum weekly.   Use a dust mask and HEPA vacuum.   Pollen and Outdoor Mold  Some people are allergic to trees, grass, or weed pollen, or molds. Try to keep your windows closed.  Limit time out doors when pollen count is high.   Ask you health care provider about taking medicine during allergy season.     Animal Dander  Some people are allergic to skin flakes, urine or saliva from pets with fur or feathers. Keep pets with fur or feathers out  of your home.    If you can t keep the pet outdoors, then keep the pet out of your bedroom.  Keep the bedroom door closed.  Keep pets off cloth furniture and away from stuffed toys.     Mice, Rats, and Cockroaches  Some people are allergic to the waste from these pests.   Cover food and garbage.  Clean up spills and food crumbs.  Store grease in the refrigerator.   Keep food out of the bedroom.   Indoor Mold  This can be a trigger if your home has high moisture. Fix leaking faucets, pipes, or other sources of water.   Clean moldy surfaces.  Dehumidify basement if it is damp and smelly.   Smoke, Strong Odors, and Sprays  These can reduce air quality. Stay away from strong odors and sprays, such as perfume, powder, hair spray, paints, smoke incense, paint, cleaning products, candles and new carpet.   Exercise or Sports  Some people with asthma have this trigger. Be active!  Ask your doctor about taking medicine before sports or exercise to prevent symptoms.    Warm up for 5-10 minutes before and after sports or exercise.     Other Triggers of Asthma  Cold air:  Cover your nose and mouth with a scarf.  Sometimes laughing or crying can be a trigger.  Some medicines and food can trigger asthma.

## 2018-03-30 NOTE — LETTER
My Depression Action Plan  Name: Bia Bill   Date of Birth 1965  Date: 3/30/2018    My doctor: Kd Leong   My clinic: [address] [address] [phone]          GREEN    ZONE   Good Control    What it looks like:     Things are going generally well. You have normal up s and down s. You may even feel depressed from time to time, but bad moods usually last less than a day.   What you need to do:  1. Continue to care for yourself (see self care plan)  2. Check your depression survival kit and update it as needed  3. Follow your physician s recommendations including any medication.  4. Do not stop taking medication unless you consult with your physician first.           YELLOW         ZONE Getting Worse    What it looks like:     Depression is starting to interfere with your life.     It may be hard to get out of bed; you may be starting to isolate yourself from others.    Symptoms of depression are starting to last most all day and this has happened for several days.     You may have suicidal thoughts but they are not constant.   What you need to do:     1. Call your care team, your response to treatment will improve if you keep your care team informed of your progress. Yellow periods are signs an adjustment may need to be made.     2. Continue your self-care, even if you have to fake it!    3. Talk to someone in your support network    4. Open up your depression survival kit           RED    ZONE Medical Alert - Get Help    What it looks like:     Depression is seriously interfering with your life.     You may experience these or other symptoms: You can t get out of bed most days, can t work or engage in other necessary activities, you have trouble taking care of basic hygiene, or basic responsibilities, thoughts of suicide or death that will not go away, self-injurious behavior.     What you need to do:  1. Call your care team and request a  same-day appointment. If they are not available (weekends or after hours) call your local crisis line, emergency room or 911.            Depression Self Care Plan / Survival Kit    Self-Care for Depression  Here s the deal. Your body and mind are really not as separate as most people think.  What you do and think affects how you feel and how you feel influences what you do and think. This means if you do things that people who feel good do, it will help you feel better.  Sometimes this is all it takes.  There is also a place for medication and therapy depending on how severe your depression is, so be sure to consult with your medical provider and/ or Behavioral Health Consultant if your symptoms are worsening or not improving.     In order to better manage my stress, I will:    Exercise  Get some form of exercise, every day. This will help reduce pain and release endorphins, the  feel good  chemicals in your brain. This is almost as good as taking antidepressants!  This is not the same as joining a gym and then never going! (they count on that by the way ) It can be as simple as just going for a walk or doing some gardening, anything that will get you moving.      Hygiene   Maintain good hygiene (Get out of bed in the morning, Make your bed, Brush your teeth, Take a shower, and Get dressed like you were going to work, even if you are unemployed).  If your clothes don't fit try to get ones that do.    Diet  I will strive to eat foods that are good for me, drink plenty of water, and avoid excessive sugar, caffeine, alcohol, and other mood-altering substances.  Some foods that are helpful in depression are: complex carbohydrates, B vitamins, flaxseed, fish or fish oil, fresh fruits and vegetables.    Psychotherapy  I agree to participate in Individual Therapy (if recommended).    Medication  If prescribed medications, I agree to take them.  Missing doses can result in serious side effects.  I understand that drinking  alcohol, or other illicit drug use, may cause potential side effects.  I will not stop my medication abruptly without first discussing it with my provider.    Staying Connected With Others  I will stay in touch with my friends, family members, and my primary care provider/team.    Use your imagination  Be creative.  We all have a creative side; it doesn t matter if it s oil painting, sand castles, or mud pies! This will also kick up the endorphins.    Witness Beauty  (AKA stop and smell the roses) Take a look outside, even in mid-winter. Notice colors, textures. Watch the squirrels and birds.     Service to others  Be of service to others.  There is always someone else in need.  By helping others we can  get out of ourselves  and remember the really important things.  This also provides opportunities for practicing all the other parts of the program.    Humor  Laugh and be silly!  Adjust your TV habits for less news and crime-drama and more comedy.    Control your stress  Try breathing deep, massage therapy, biofeedback, and meditation. Find time to relax each day.     My support system    Clinic Contact:  Phone number:    Contact 1:  Phone number:    Contact 2:  Phone number:    Hindu/:  Phone number:    Therapist:  Phone number:    Local crisis center:    Phone number:    Other community support:  Phone number:

## 2018-03-30 NOTE — NURSING NOTE
"Chief Complaint   Patient presents with     Cough       Initial /73 (BP Location: Right arm, Patient Position: Sitting, Cuff Size: Adult Large)  Pulse 76  Temp 98.5  F (36.9  C) (Tympanic)  Ht 4' 11\" (1.499 m)  Wt 223 lb 14.4 oz (101.6 kg)  LMP 09/27/2009  SpO2 93%  BMI 45.22 kg/m2 Estimated body mass index is 45.22 kg/(m^2) as calculated from the following:    Height as of this encounter: 4' 11\" (1.499 m).    Weight as of this encounter: 223 lb 14.4 oz (101.6 kg).  Medication Reconciliation: complete   Sheree Gore LPN    "

## 2018-04-02 ENCOUNTER — TELEPHONE (OUTPATIENT)
Dept: FAMILY MEDICINE | Facility: CLINIC | Age: 53
End: 2018-04-02

## 2018-04-02 DIAGNOSIS — H90.8 MIXED CONDUCTIVE AND SENSORINEURAL HEARING LOSS, UNSPECIFIED LATERALITY: ICD-10-CM

## 2018-04-02 DIAGNOSIS — F33.1 MAJOR DEPRESSIVE DISORDER, RECURRENT EPISODE, MODERATE (H): ICD-10-CM

## 2018-04-02 DIAGNOSIS — R06.02 SOB (SHORTNESS OF BREATH): Primary | ICD-10-CM

## 2018-04-02 RX ORDER — TRAZODONE HYDROCHLORIDE 150 MG/1
TABLET ORAL
Qty: 30 TABLET | Refills: 0 | Status: SHIPPED | OUTPATIENT
Start: 2018-04-02 | End: 2018-05-02

## 2018-04-02 NOTE — TELEPHONE ENCOUNTER
"TRAZODONE 150 MG TAB TEVA        Last Written Prescription Date:  3/4/18  Last Fill Quantity: 30,   # refills: 0  Last Office Visit: 3/30/18  Future Office visit:    Next 5 appointments (look out 90 days)     Apr 03, 2018  1:00 PM CDT   Return Visit with Kd Hutchinsifton   [facility] ([facility])    [address] [address]   [phone]            Apr 19, 2018  9:30 AM CDT   [facility]    [address] [address]   [phone]                   Requested Prescriptions   Pending Prescriptions Disp Refills     traZODone (DESYREL) 150 MG tablet [Pharmacy Med Name: TRAZODONE 150 MG    TAB TEVA] 30 tablet 0     Sig: TAKE ONE TABLET BY MOUTH NIGHTLY AT BEDTIME    Serotonin Modulators Passed    4/2/2018  8:51 AM       Passed - Recent (12 mo) or future (30 days) visit within the authorizing provider's specialty    Patient had office visit in the last 12 months or has a visit in the next 30 days with authorizing provider or within the authorizing provider's specialty.  See \"Patient Info\" tab in inbasket, or \"Choose Columns\" in Meds & Orders section of the refill encounter.           Passed - Patient is age 18 or older       Passed - No active pregnancy on record       Passed - No positive pregnancy test in past 12 months      ]  "

## 2018-04-02 NOTE — TELEPHONE ENCOUNTER
Reason for Call: Request for an order or referral:    Order or referral being requested: Norah is requesting a referral for ENT ([facility]) and Cardiologist.  She stated that she needs hearing aides and also is worried about her heart.  Please call and assess. Thank you..Lizzette Mcintosh    Date needed: as soon as possible    Has the patient been seen by the PCP for this problem? not known    Phone number Patient can be reached at:  Home number on file [phone] (home)    Best Time:  Any time    Can we leave a detailed message on this number?  YES    Call taken on 4/2/2018 at 1:36 PM by Lizzette Mcintosh

## 2018-04-03 ENCOUNTER — OFFICE VISIT (OUTPATIENT)
Dept: PSYCHOLOGY | Facility: CLINIC | Age: 53
End: 2018-04-03
Payer: COMMERCIAL

## 2018-04-03 DIAGNOSIS — I89.0 LYMPHEDEMA OF BOTH LOWER EXTREMITIES: ICD-10-CM

## 2018-04-03 DIAGNOSIS — J44.1 COPD EXACERBATION (H): ICD-10-CM

## 2018-04-03 DIAGNOSIS — F31.62 MODERATE MIXED BIPOLAR I DISORDER (H): Primary | ICD-10-CM

## 2018-04-03 PROCEDURE — 90834 PSYTX W PT 45 MINUTES: CPT | Performed by: PSYCHOLOGIST

## 2018-04-03 NOTE — MR AVS SNAPSHOT
MRN:[id number]                      After Visit Summary   4/3/2018    Bia Bill    MRN: [id number]           Visit Information        Provider Department      4/3/2018 1:00 PM Kd Morris [facility] Generic      Your next 10 appointments already scheduled     Apr 06, 2018  1:30 PM CDT   LAB with [place] [facility]   [facility]    [address] [address]   [phone]           Please do not eat 10-12 hours before your appointment if you are coming in fasting for labs on lipids, cholesterol, or glucose (sugar). This does not apply to pregnant women. Water, hot tea and black coffee (with nothing added) are okay. Do not drink other fluids, diet soda or chew gum.            Apr 16, 2018  9:30 AM CDT   Spirometry Bronchodilator with [place] PULMONARY FUNCTION   [facility] ([facility])    [address] [address]   [phone]           No inhalers for 6 hours prior to test            Apr 19, 2018  9:30 AM CDT   Return Visit with Kd Morris   [facility] ([facility])    [address] [address]   [phone]            May 03, 2018  9:30 AM CDT   Return Visit with Kd Morris   [facility] ([facility])    [address] [address]   [phone]            May 03, 2018 11:00 AM CDT   New Visit with Rajinder Bose MD   [facility] ([facility])    [address] [place] [address]   [phone]            Jun 22, 2018  1:00 PM CDT   LAB with [place] [facility]   [facility] ([facility])    [address] [place] [address]   [phone]           Please do not eat 10-12 hours before your appointment if you are coming in fasting for labs on lipids, cholesterol, or  "glucose (sugar). This does not apply to pregnant women. Water, hot tea and black coffee (with nothing added) are okay. Do not drink other fluids, diet soda or chew gum.            Sep 21, 2018  1:00 PM CDT   [facility]    [address]  [facility] [address]   [phone]           Please do not eat 10-12 hours before your appointment if you are coming in fasting for labs on lipids, cholesterol, or glucose (sugar). This does not apply to pregnant women. Water, hot tea and black coffee (with nothing added) are okay. Do not drink other fluids, diet soda or chew gum.            Sep 28, 2018  1:30 PM CDT   Return Visit with Joshua Watts MD   [facility] ([facility])    [facility]  [address]  [facility] [address]   [phone]              MyChart Information     vSocial lets you send messages to your doctor, view your test results, renew your prescriptions, schedule appointments and more. To sign up, go to [email or web address] . Click on \"Log in\" on the left side of the screen, which will take you to the Welcome page. Then click on \"Sign up Now\" on the right side of the page.     You will be asked to enter the access code listed below, as well as some personal information. Please follow the directions to create your username and password.     Your access code is: [email or web address]  Expires: 2018  1:16 PM     Your access code will  in 90 days. If you need help or a new code, please call your [place] clinic or [phone].        Care EveryWhere ID     This is your Care EveryWhere ID. This could be used by other organizations to access your [place] medical records  [id number]        Equal Access to Services     ANTIONETTE IRBY : Elfego Clark, waldemar rust, nitin nelson . So [facility] [place] " [phone].    ATENCIÓN: Si habla español, tiene a terry disposición servicios gratuitos de asistencia lingüística. Llame al [phone].    We comply with applicable federal civil rights laws and [place] laws. We do not discriminate on the basis of race, color, national origin, age, disability, sex, sexual orientation, or gender identity.

## 2018-04-04 RX ORDER — BUPROPION HYDROCHLORIDE 150 MG/1
TABLET ORAL
Qty: 90 TABLET | Refills: 0 | Status: SHIPPED | OUTPATIENT
Start: 2018-04-04 | End: 2018-07-02

## 2018-04-04 RX ORDER — FUROSEMIDE 20 MG
TABLET ORAL
Qty: 180 TABLET | Refills: 0 | Status: SHIPPED | OUTPATIENT
Start: 2018-04-04 | End: 2018-05-02

## 2018-04-04 NOTE — TELEPHONE ENCOUNTER
Medication is being filled for 1 time refill only due to:  Patient needs to be seen because needs follow up appt and phq9.   Mundo Conde RN

## 2018-04-05 DIAGNOSIS — E78.00 HYPERCHOLESTEREMIA: ICD-10-CM

## 2018-04-05 ASSESSMENT — ANXIETY QUESTIONNAIRES
7. FEELING AFRAID AS IF SOMETHING AWFUL MIGHT HAPPEN: NOT AT ALL
6. BECOMING EASILY ANNOYED OR IRRITABLE: NEARLY EVERY DAY
1. FEELING NERVOUS, ANXIOUS, OR ON EDGE: MORE THAN HALF THE DAYS
GAD7 TOTAL SCORE: 15
4. TROUBLE RELAXING: NEARLY EVERY DAY
2. NOT BEING ABLE TO STOP OR CONTROL WORRYING: MORE THAN HALF THE DAYS
3. WORRYING TOO MUCH ABOUT DIFFERENT THINGS: MORE THAN HALF THE DAYS
5. BEING SO RESTLESS THAT IT IS HARD TO SIT STILL: NEARLY EVERY DAY

## 2018-04-05 NOTE — PROGRESS NOTES
Progress Note  Disclaimer: This note consists of symbols derived from keyboarding, dictation and/or voice recognition software. As a result, there may be errors in the script that have gone undetected. Please consider this when interpreting information found in this chart.    Client Name: Bia Bill  Date: 4/3/2018         Service Type: Individual      Session Start Time: 1:00 PM  Session End Time: 1:45 PM      Session Length: 45     Session #: 28     Attendees: Client attended alone    Treatment Plan Last Reviewed: 4/3/2018  PHQ-9 / CHIN-7 : See flowsheet     DATA   Client reports she is standing up for herself more, though her family is having trouble adjusting to this.  She did start talking to 1 of her sisters again and that feels good.  She is considering moving from her current residence to something a little more easy to take care of but is resisting of the push by some family members to go into assisted living.  Client reports she is back on steroids due to an upper respiratory infection and this is increasing her anxiety somewhat.   Progress Since Last Session (Related to Symptoms / Goals / Homework):   Symptoms: Stable    Homework: Achieved / completed to satisfaction      Episode of Care Goals: Satisfactory progress - ACTION (Actively working towards change); Intervened by reinforcing change plan / affirming steps taken     Current / Ongoing Stressors and Concerns:   Recurrent depression  family relational problems, medical problems, chronic pain, trauma history      Treatment Objective(s) Addressed in This Session:   Increase interest, engagement, and pleasure in doing things  Decrease frequency and intensity of feeling down, depressed, hopeless       Intervention:   CBT: Behavioral activation reviewed boundaries with family   ASSESSMENT: Current Emotional / Mental Status (status of significant symptoms):   Risk status (Self / Other harm or suicidal  ideation)   Client denies current fears or concerns for personal safety.   Client denies current or recent suicidal ideation or behaviors.   Client denies current or recent homicidal ideation or behaviors.   Client denies current or recent self injurious behavior or ideation.   Client denies other safety concerns.   A safety and risk management plan has not been developed at this time, however client was given the after-hours number / 911 should there be a change in any of these risk factors.     Appearance:   Appropriate    Eye Contact:   Good    Psychomotor Behavior: Normal    Attitude:   Cooperative    Orientation:   All   Speech    Rate / Production: Normal     Volume:  Normal    Mood:    Normal   Affect:    Appropriate    Thought Content:  Clear    Thought Form:  Coherent  Logical    Insight:    Good      Medication Review:   No changes to current psychiatric medication(s)     Medication Compliance:   Yes     Changes in Health Issues:   None reported     Chemical Use Review:   Substance Use: Chemical use reviewed, no active concerns identified      Tobacco Use: Client reports no smoking for the last week     Collateral Reports Completed:   Not Applicable    PLAN: (Client Tasks / Therapist Tasks / Other)  Client to continue With her self-care routine and maintenance of boundaries.   Maintain medication compliance and contact with psychiatry.  Take care of herself physically.  Kd Morris                                                         ________________________________________________________________________    Treatment Plan    Client's Name: Bia Bill  YOB: 1965    Date: 10/24/2017      DSM5 Diagnoses: (Sustained by DSM5 Criteria Listed Above)  Diagnoses: 296.40 Bipolar I Disorder Current or Most Recent Episode Manic, Unspecified  Psychosocial & Contextual Factors: financial difficulties, chronic pain, roommate issues  WHODAS 2.0 (12 item)               This questionnaire asks  about difficulties due to health conditions. Health conditions             include disease or illnesses, other health problems that may be short or long lasting,             injuries, mental health or emotional problems, and problems with alcohol or drugs.                         Think back over the past 30 days and answer these questions, thinking about how much             difficulty you had doing the following activities. For each question, please [place] only             one response.      S1  Standing for long periods such as 30 minutes?  Mild =           2    S2  Taking care of household responsibilities?  Moderate =   3    S3  Learning a new task, for example, learning how to get to a new place?  Mild =           2    S4  How much of a problem do you have joining community activities (for example, festivals, Orthodox or other activities) in the same way as anyone else can?  Moderate =   3    S5  How much have you been emotionally affected by your health problems?  Mild =           2        In the past 30 days, how much difficulty did you have in:    S6  Concentrating on doing something for ten minutes?  Mild =           2    S7  Walking a long distance such as a kilometer (or equivalent)?  Severe =       4    S8  Washing your whole body?  None =         1    S9  Getting dressed?  None =         1    S10  Dealing with people you do not know?  Moderate =   3    S11  Maintaining a friendship?  Severe =       4    S12  Your day to day work?  Moderate =   3       H1  Overall, in the past 30 days, how many days were these difficulties present?  Record number of days seven    H2  In the past 30 days, for how many days were you totally unable to carry out your usual activities or work because of any health condition?  Record number of days  seven    H3  In the past 30 days, not counting the days that you were totally unable, for how many days did you cut back or reduce your usual activities or work because of any  health condition?  Record number of days five                   Referral / Collaboration:  Referral to another professional/service is not indicated at this time..    Anticipated number of session or this episode of care: 15    Goals  1. Education- the Biopsychosocial model of depression  a. Client will be able to describe how depression is effecting them physically, emotionally and socially  2. Behavioral Activation  a. Client will learn to assess their depression on a day to day basis  b. Client will Identify two forms of exercise/activity and engage in them 3 times per week  c. Client will Identify 3 things that make them laugh, and engage in these 5 times per week.  d. Client will Identify 1-2Creative activities or hobbies  and engage in them 2 times per week  e. Client will identify music, movies, books that make them feel good and use them 3-4 times per week  3. Self-care  a. Client will identify 5 things they can do just for themselves  b. Client will take time for quiet, reflection, meditation 5 times per week  c. Client will Learn to set boundaries when appropriate  d. Client will Identify 2 individuals they can call on for support, distraction  4. Assessment of progress  a. Client will engage in assessment of their progress on a regular basis    Bipolar Disorder  Treatment plan:  1. Education- the Biopsychosocial model of Bipolar Disorder    a. Client will be able to describe in general terms what Bipolar Disorder  is and is not  b. Client will be able to describe how Bipolar Disorder  has affected their life in at least two different areas, such as school or work and Home/relationships  c. Clients parents/guardians or significant others will be provided information on what Bipolar Disorder  is and the ways it can affect relationships and be encouraged to be a part of clients treatment team.  2. Medication  a. Client will participate in medication evaluation for Bipolar Disorder  symptoms and follow  medication recommendations.   3. Identification and management of triggers  a. Client and therapist will examine patient s history to determine if there are predictable triggers for manic or depressive episodes (e.g. boredom, anger, family stress)  b. Client and therapist will develop means of diffusing these triggers (e.g. relaxation strategies, boundary setting, anger management)  4. Comorbid conditions   a. Client and therapist will asses for comorbid conditions (e.g. anxiety, depression, substance use). and add additional items to treatment plan as needed  5. Self-care  a. Client will identify 3 things they can do just for themselves  b. Client will take time for quiet, reflection, meditation 3 times per week  c. Client will Learn to set boundaries when appropriate  d. Client will Identify 2 individuals they can call on for support, distraction  5. Behavioral Activation  a. Client will Identify two forms of exercise/activity and engage in them 3 times per week  b. Client will Identify 2 things that make them laugh, and engage in these 3 times per week.  c. Client will Identify 2 Creative activities or hobbies  and engage in them 2 times per week  d. Client will identify music, movies, books that make them feel good and use them 3 times per week  6. Assessment of progress  a. Client will engage in assessment of their progress on a regular basis    Client has reviewed and agreed to the above plan.      Kd Morris  4/3/2018

## 2018-04-05 NOTE — TELEPHONE ENCOUNTER
"SIMVASTATIN 20 MG TAB ACCO        Last Written Prescription Date:  4/25/17  Last Fill Quantity: 90,   # refills: 3  Last Office Visit: 3/30/18  Future Office visit:    Next 5 appointments (look out 90 days)     Apr 19, 2018  9:30 AM CDT   Return Visit with Kd CAMMY [facility] ([facility]    [address] [address]   [phone]            May 03, 2018  9:30 AM CDT   Return Visit with [facility] ([facility])    [address] [address]   [phone]                   Requested Prescriptions   Pending Prescriptions Disp Refills     simvastatin (ZOCOR) 20 MG tablet [Pharmacy Med Name: SIMVASTATIN 20 MG   TAB ACCO] 30 tablet 2     Sig: TAKE ONE TABLET BY MOUTH NIGHTLY AT BEDTIME    Statins Protocol Failed    4/5/2018  9:32 AM       Failed - LDL on file in past 12 months    Recent Labs   Lab Test  03/15/17   1032   LDL  90            Passed - No abnormal creatine kinase in past 12 months    Recent Labs   Lab Test  11/30/15   1508   CKT  102               Passed - Recent (12 mo) or future (30 days) visit within the authorizing provider's specialty    Patient had office visit in the last 12 months or has a visit in the next 30 days with authorizing provider or within the authorizing provider's specialty.  See \"Patient Info\" tab in inbasket, or \"Choose Columns\" in Meds & Orders section of the refill encounter.           Passed - Patient is age 18 or older       Passed - No active pregnancy on record       Passed - No positive pregnancy test in past 12 months          "

## 2018-04-06 ENCOUNTER — ANTICOAGULATION THERAPY VISIT (OUTPATIENT)
Dept: ANTICOAGULATION | Facility: CLINIC | Age: 53
End: 2018-04-06

## 2018-04-06 DIAGNOSIS — Z79.01 LONG TERM CURRENT USE OF ANTICOAGULANT THERAPY: ICD-10-CM

## 2018-04-06 DIAGNOSIS — I73.9 PVD (PERIPHERAL VASCULAR DISEASE) WITH CLAUDICATION (H): ICD-10-CM

## 2018-04-06 DIAGNOSIS — I82.409 DVT (DEEP VENOUS THROMBOSIS) (H): ICD-10-CM

## 2018-04-06 DIAGNOSIS — D75.1 ERYTHROCYTOSIS: Chronic | ICD-10-CM

## 2018-04-06 DIAGNOSIS — I73.9 PERIPHERAL VASCULAR DISEASE WITH CLAUDICATION (H): ICD-10-CM

## 2018-04-06 DIAGNOSIS — E03.9 HYPOTHYROIDISM, UNSPECIFIED TYPE: Chronic | ICD-10-CM

## 2018-04-06 DIAGNOSIS — E78.00 HYPERCHOLESTEREMIA: ICD-10-CM

## 2018-04-06 DIAGNOSIS — I82.4Y9 DEEP VEIN THROMBOSIS (DVT) OF PROXIMAL LOWER EXTREMITY, UNSPECIFIED CHRONICITY, UNSPECIFIED LATERALITY (H): ICD-10-CM

## 2018-04-06 LAB
BASOPHILS # BLD AUTO: 0 10E9/L (ref 0–0.2)
BASOPHILS NFR BLD AUTO: 0.1 %
CHOLEST SERPL-MCNC: 187 MG/DL
DIFFERENTIAL METHOD BLD: ABNORMAL
EOSINOPHIL # BLD AUTO: 0 10E9/L (ref 0–0.7)
EOSINOPHIL NFR BLD AUTO: 0.2 %
ERYTHROCYTE [DISTWIDTH] IN BLOOD BY AUTOMATED COUNT: 22.1 % (ref 10–15)
HCT VFR BLD AUTO: 59.8 % (ref 35–47)
HDLC SERPL-MCNC: 46 MG/DL
HGB BLD-MCNC: 19.4 G/DL (ref 11.7–15.7)
IMM GRANULOCYTES # BLD: 0.1 10E9/L (ref 0–0.4)
IMM GRANULOCYTES NFR BLD: 0.6 %
INR PPP: 4.19 (ref 0.86–1.14)
LDLC SERPL CALC-MCNC: 101 MG/DL
LYMPHOCYTES # BLD AUTO: 1.3 10E9/L (ref 0.8–5.3)
LYMPHOCYTES NFR BLD AUTO: 14.4 %
MCH RBC QN AUTO: 29.3 PG (ref 26.5–33)
MCHC RBC AUTO-ENTMCNC: 32.4 G/DL (ref 31.5–36.5)
MCV RBC AUTO: 91 FL (ref 78–100)
MONOCYTES # BLD AUTO: 0.5 10E9/L (ref 0–1.3)
MONOCYTES NFR BLD AUTO: 5.8 %
NEUTROPHILS # BLD AUTO: 7.1 10E9/L (ref 1.6–8.3)
NEUTROPHILS NFR BLD AUTO: 78.9 %
NONHDLC SERPL-MCNC: 141 MG/DL
PLATELET # BLD AUTO: 110 10E9/L (ref 150–450)
RBC # BLD AUTO: 6.61 10E12/L (ref 3.8–5.2)
TRIGL SERPL-MCNC: 198 MG/DL
TSH SERPL DL<=0.005 MIU/L-ACNC: 1.07 MU/L (ref 0.4–4)
WBC # BLD AUTO: 8.9 10E9/L (ref 4–11)

## 2018-04-06 PROCEDURE — 80061 LIPID PANEL: CPT | Performed by: INTERNAL MEDICINE

## 2018-04-06 PROCEDURE — 85610 PROTHROMBIN TIME: CPT | Performed by: FAMILY MEDICINE

## 2018-04-06 PROCEDURE — 85025 COMPLETE CBC W/AUTO DIFF WBC: CPT | Performed by: INTERNAL MEDICINE

## 2018-04-06 PROCEDURE — 36415 COLL VENOUS BLD VENIPUNCTURE: CPT | Performed by: FAMILY MEDICINE

## 2018-04-06 PROCEDURE — 99207 ZZC NO CHARGE NURSE ONLY: CPT

## 2018-04-06 PROCEDURE — 84443 ASSAY THYROID STIM HORMONE: CPT | Performed by: INTERNAL MEDICINE

## 2018-04-06 RX ORDER — SIMVASTATIN 20 MG
TABLET ORAL
Qty: 30 TABLET | Refills: 0 | Status: SHIPPED | OUTPATIENT
Start: 2018-04-06 | End: 2018-05-07

## 2018-04-06 ASSESSMENT — PATIENT HEALTH QUESTIONNAIRE - PHQ9: SUM OF ALL RESPONSES TO PHQ QUESTIONS 1-9: 9

## 2018-04-06 ASSESSMENT — ANXIETY QUESTIONNAIRES: GAD7 TOTAL SCORE: 15

## 2018-04-06 NOTE — MR AVS SNAPSHOT
Bia S Bill   4/6/2018   Anticoagulation Therapy Visit    Description:  52 year old female   Provider:  Reina Forrest, RN   [facility]           INR as of 4/6/2018     Today's INR 4.19!      Anticoagulation Summary as of 4/6/2018     INR goal 2.0-2.5   Today's INR 4.19!   Full instructions 4/6: Hold; 4/7: 10 mg; 4/11: 10 mg; 4/14: 10 mg; 4/18: 10 mg; Otherwise 12.5 mg on Wed, Sat; 10 mg all other days   Next INR check 4/18/2018    Indications   Long term current use of anticoagulant therapy [Z79.01]  DVT (deep venous thrombosis) (H) [I82.409]  PVD (peripheral vascular disease) with claudication (H) [I73.9]         April 2018 Details    Sun Mon Tue Wed Thu Fri Sat     1               2               3               4               5               6      Hold   See details      7      10 mg           8      10 mg         9      10 mg         10      10 mg         11      10 mg         12      10 mg         13      10 mg         14      10 mg           15      10 mg         16      10 mg         17      10 mg         18            19               20               21                 22               23               24               25               26               27               28                 29               30                     Date Details   04/06 This INR check       Date of next INR:  4/18/2018         How to take your warfarin dose     To take:  10 mg Take 2 of the 5 mg tablets.    Hold Do not take your warfarin dose. See the Details table to the right for additional instructions.

## 2018-04-06 NOTE — PROGRESS NOTES
ANTICOAGULATION FOLLOW-UP CLINIC VISIT    Patient Name:  Bia Bill  Date:  4/6/2018  Contact Type:  Telephone/ Norah    SUBJECTIVE:     Patient Findings     Positives Inflammation, Antibiotic use or infection (Daily Levaquin. Prednisone.)    Comments Patient is recovering from COPD exacerbation. Is on daily Levaquin and Prednisone. Instructed patient to hold her dose for today then to resume 10 mg daily until the next scheduled recheck on 4/18/18. Patient denies signs or symptoms of bleeding. Writer educated patient regarding increased bleed risk and when to seek immediate medical attention. Patient verbalized understanding.             OBJECTIVE    INR   Date Value Ref Range Status   04/06/2018 4.19 (H) 0.86 - 1.14 Final       ASSESSMENT / PLAN  INR assessment SUPRA    Recheck INR In: 2 WEEKS    INR Location Outside lab      Anticoagulation Summary as of 4/6/2018     INR goal 2.0-2.5   Today's INR 4.19!   Maintenance plan 12.5 mg (5 mg x 2.5) on Wed, Sat; 10 mg (5 mg x 2) all other days   Full instructions 4/6: Hold; 4/7: 10 mg; 4/11: 10 mg; 4/14: 10 mg; 4/18: 10 mg; Otherwise 12.5 mg on Wed, Sat; 10 mg all other days   Weekly total 75 mg   Plan last modified Mireille Torres RN (12/27/2017)   Next INR check 4/18/2018   Priority INR   Target end date Indefinite    Indications   Long term current use of anticoagulant therapy [Z79.01]  DVT (deep venous thrombosis) (H) [I82.409]  PVD (peripheral vascular disease) with claudication (H) [I73.9]         Anticoagulation Episode Summary     INR check location     Preferred lab     Send INR reminders to [place] PHONE [facility] POOL    Comments * lab draw due to elevated hematocrit (fingerstick meters don't work). LEFT LE. STENT x 3 LEFT UPPER LEG. Previous arterial clot. New goal range 2-2.5 starting 11/6/17.      Anticoagulation Care Providers     Provider Role Specialty Phone number    Kd Leong MD [facility] [phone]            See the  Encounter Report to view Anticoagulation Flowsheet and Dosing Calendar (Go to Encounters tab in chart review, and find the Anticoagulation Therapy Visit)    Patient is recovering from COPD exacerbation. Is on daily Levaquin and Prednisone. Instructed patient to hold her dose for today then to resume 10 mg daily until the next scheduled recheck on 4/18/18. Patient denies signs or symptoms of bleeding. Writer educated patient regarding increased bleed risk and when to seek immediate medical attention. Patient verbalized understanding.      Reina Forrest RN

## 2018-04-06 NOTE — TELEPHONE ENCOUNTER
Medication (simvastatin) is being filled for 1 time refill only due to:  Patient needs labs Lipids. Future labs ordered Lipids.     Lab Results   Component Value Date    CHOL 154 03/15/2017     Lab Results   Component Value Date    HDL 30 03/15/2017     Lab Results   Component Value Date    LDL 90 03/15/2017     Lab Results   Component Value Date    TRIG 172 03/15/2017     Lab Results   Component Value Date    CHOLHDLRATIO 5.0 09/10/2015     Brenna FLETCHER RN

## 2018-04-12 ENCOUNTER — ALLIED HEALTH/NURSE VISIT (OUTPATIENT)
Dept: AUDIOLOGY | Facility: CLINIC | Age: 53
End: 2018-04-12
Payer: COMMERCIAL

## 2018-04-12 ENCOUNTER — ANTICOAGULATION THERAPY VISIT (OUTPATIENT)
Dept: ANTICOAGULATION | Facility: CLINIC | Age: 53
End: 2018-04-12

## 2018-04-12 DIAGNOSIS — Z79.01 LONG TERM CURRENT USE OF ANTICOAGULANT THERAPY: ICD-10-CM

## 2018-04-12 DIAGNOSIS — I82.409 DVT (DEEP VENOUS THROMBOSIS) (H): ICD-10-CM

## 2018-04-12 DIAGNOSIS — I73.9 PERIPHERAL VASCULAR DISEASE WITH CLAUDICATION (H): ICD-10-CM

## 2018-04-12 DIAGNOSIS — I82.4Y9 DEEP VEIN THROMBOSIS (DVT) OF PROXIMAL LOWER EXTREMITY, UNSPECIFIED CHRONICITY, UNSPECIFIED LATERALITY (H): Chronic | ICD-10-CM

## 2018-04-12 DIAGNOSIS — H90.3 SENSORINEURAL HEARING LOSS, ASYMMETRICAL: Primary | ICD-10-CM

## 2018-04-12 DIAGNOSIS — I73.9 PVD (PERIPHERAL VASCULAR DISEASE) WITH CLAUDICATION (H): ICD-10-CM

## 2018-04-12 DIAGNOSIS — D75.1 ERYTHROCYTOSIS: Chronic | ICD-10-CM

## 2018-04-12 LAB
BASOPHILS # BLD AUTO: 0 10E9/L (ref 0–0.2)
BASOPHILS NFR BLD AUTO: 0.1 %
DIFFERENTIAL METHOD BLD: ABNORMAL
EOSINOPHIL # BLD AUTO: 0.1 10E9/L (ref 0–0.7)
EOSINOPHIL NFR BLD AUTO: 0.6 %
ERYTHROCYTE [DISTWIDTH] IN BLOOD BY AUTOMATED COUNT: 22.9 % (ref 10–15)
HCT VFR BLD AUTO: 61.1 % (ref 35–47)
HGB BLD-MCNC: 19.9 G/DL (ref 11.7–15.7)
IMM GRANULOCYTES # BLD: 0 10E9/L (ref 0–0.4)
IMM GRANULOCYTES NFR BLD: 0.2 %
INR PPP: 1.68 (ref 0.86–1.14)
LYMPHOCYTES # BLD AUTO: 1.2 10E9/L (ref 0.8–5.3)
LYMPHOCYTES NFR BLD AUTO: 13.4 %
MCH RBC QN AUTO: 29.4 PG (ref 26.5–33)
MCHC RBC AUTO-ENTMCNC: 32.6 G/DL (ref 31.5–36.5)
MCV RBC AUTO: 90 FL (ref 78–100)
MONOCYTES # BLD AUTO: 0.4 10E9/L (ref 0–1.3)
MONOCYTES NFR BLD AUTO: 4.7 %
NEUTROPHILS # BLD AUTO: 7.1 10E9/L (ref 1.6–8.3)
NEUTROPHILS NFR BLD AUTO: 81 %
PLATELET # BLD AUTO: 105 10E9/L (ref 150–450)
RBC # BLD AUTO: 6.76 10E12/L (ref 3.8–5.2)
WBC # BLD AUTO: 8.7 10E9/L (ref 4–11)

## 2018-04-12 PROCEDURE — 99207 ZZC NO CHARGE LOS: CPT | Performed by: AUDIOLOGIST

## 2018-04-12 PROCEDURE — 85610 PROTHROMBIN TIME: CPT | Performed by: FAMILY MEDICINE

## 2018-04-12 PROCEDURE — V5266 BATTERY FOR HEARING DEVICE: HCPCS | Performed by: AUDIOLOGIST

## 2018-04-12 PROCEDURE — 85025 COMPLETE CBC W/AUTO DIFF WBC: CPT | Performed by: FAMILY MEDICINE

## 2018-04-12 PROCEDURE — 99207 ZZC NO CHARGE NURSE ONLY: CPT

## 2018-04-12 PROCEDURE — 36415 COLL VENOUS BLD VENIPUNCTURE: CPT | Performed by: FAMILY MEDICINE

## 2018-04-12 NOTE — PROGRESS NOTES
"  ANTICOAGULATION FOLLOW-UP CLINIC VISIT    Patient Name:  Bia Bill  Date:  4/12/2018  Contact Type:  Telephone/ writer spoke with Norah on phone    SUBJECTIVE:     Patient Findings     Positives Antibiotic use or infection (levaquin is finished and has one day left of prednisone)    Comments Patient is feeling better, although she is still \"coughing up stuff\". Since levaquin is now finished and patient feeling better, will need to increase warfarin dose again. She will have close to her usual maintenance dose by next week when she rechecks INR. Usual dosing is 75 mg/week and patient will have had 77.5 mg due to increased dose today to help boost INR back up. No other changes or concerns.            OBJECTIVE    INR   Date Value Ref Range Status   04/12/2018 1.68 (H) 0.86 - 1.14 Final     Comment:     Special tube used to correct for high hematocrit       ASSESSMENT / PLAN  INR assessment SUB finished abx   Recheck INR In: 1 WEEK    INR Location Clinic lab     Anticoagulation Summary as of 4/12/2018     INR goal 2.0-2.5   Today's INR 1.68!   Maintenance plan 12.5 mg (5 mg x 2.5) on Wed, Sat; 10 mg (5 mg x 2) all other days   Full instructions 4/12: 15 mg; 4/14: 10 mg; 4/16: 12.5 mg; 4/18: 10 mg; Otherwise 12.5 mg on Wed, Sat; 10 mg all other days   Weekly total 75 mg   Plan last modified Mireille Torres RN (12/27/2017)   Next INR check 4/19/2018   Priority INR   Target end date Indefinite    Indications   Long term current use of anticoagulant therapy [Z79.01]  DVT (deep venous thrombosis) (H) [I82.409]  PVD (peripheral vascular disease) with claudication (H) [I73.9]         Anticoagulation Episode Summary     INR check location     Preferred lab     Send INR reminders to [place] PHONE [facility] POOL    Comments * lab draw due to elevated hematocrit (fingerstick meters don't work). LEFT LE. STENT x 3 LEFT UPPER LEG. Previous arterial clot. New goal range 2-2.5 starting 11/6/17.      Anticoagulation Care Providers "     Provider Role Specialty Phone number    Kd Leong MD [facility] [phone]            See the Encounter Report to view Anticoagulation Flowsheet and Dosing Calendar (Go to Encounters tab in chart review, and find the Anticoagulation Therapy Visit)        Mireille Torres RN

## 2018-04-12 NOTE — MR AVS SNAPSHOT
After Visit Summary   4/12/2018    Bia Bill    MRN: [id number]           Patient Information     Date Of Birth          1965        Visit Information        Provider Department      4/12/2018 11:30 AM Rhiannon oFng, [facility]        Today's Diagnoses     Sensorineural hearing loss, asymmetrical    -  1       Follow-ups after your visit        Your next 10 appointments already scheduled     Apr 16, 2018  9:30 AM CDT   Spirometry Bronchodilator with [place] PULMONARY FUNCTION   [facility] ([facility])    [address] [address]   [phone]           No inhalers for 6 hours prior to test            Apr 17, 2018 11:30 AM CDT   Return Visit with Hannah Middleton   [facility] ([facility]    [address] [address]   [phone]            Apr 19, 2018  9:30 AM CDT   Return Visit with Kd Hutchinsifton   [facility] ([facility])    [address] [address]   [phone]            May 03, 2018  9:30 AM CDT   Return Visit with Kd Hutchinsifton   [facility] ([facility])    [address] [address]   [phone]            May 03, 2018 11:00 AM CDT   New Visit with Rajinder Bose MD   [facility] ([facility])    [address] [address]   [phone]            Jun 22, 2018  1:00 PM CDT   LAB with [place] LAB   [facility] ([facility]    [address] [address]   [phone]           Please do not eat 10-12 hours before your appointment if you are coming in fasting for labs on lipids, cholesterol, or glucose (sugar). This does not apply to pregnant women. Water, hot tea and black coffee (with nothing added) are okay. Do not drink other fluids, diet soda  "or chew gum.            Sep 21, 2018  1:00 PM CDT   LAB with [place] LAB   [facility]    [address] [place]  [facility] [address]   [phone]           Please do not eat 10-12 hours before your appointment if you are coming in fasting for labs on lipids, cholesterol, or glucose (sugar). This does not apply to pregnant women. Water, hot tea and black coffee (with nothing added) are okay. Do not drink other fluids, diet soda or chew gum.            Sep 28, 2018  1:30 PM CDT   Return Visit with Joshua Watts MD   [facility] ([facility])    [facility]  [address]  [facility] [address]   [phone]              Who to contact     If you have questions or need follow up information about today's clinic visit or your schedule please contact [facility] directly at [phone].  Normal or non-critical lab and imaging results will be communicated to you by Sanivationhart, letter or phone within 4 business days after the clinic has received the results. If you do not hear from us within 7 days, please contact the clinic through Sanivationhart or phone. If you have a critical or abnormal lab result, we will notify you by phone as soon as possible.  Submit refill requests through Stadionaut or call your pharmacy and they will forward the refill request to us. Please allow 3 business days for your refill to be completed.          Additional Information About Your Visit        Stadionaut Information     St[employer]aut lets you send messages to your doctor, view your test results, renew your prescriptions, schedule appointments and more. To sign up, go to [email or web address] . Click on \"Log in\" on the left side of the screen, which will take you to the Welcome page. Then click on \"Sign up Now\" on the right side of the page.     You will be asked to enter the access code listed below, as well as some personal information. " Please follow the directions to create your username and password.     Your access code is: [email or web address]  Expires: 2018  1:16 PM     Your access code will  in 90 days. If you need help or a new code, please call your [facility] clinic or [phone].        Care EveryWhere ID     This is your Care EveryWhere ID. This could be used by other organizations to access your [facility] medical records  [id number]        Your Vitals Were     Last Period                   2009            Blood Pressure from Last 3 Encounters:   18 132/73   18 118/55   18 124/72    Weight from Last 3 Encounters:   18 223 lb 14.4 oz (101.6 kg)   18 224 lb 14.4 oz (102 kg)   18 224 lb 13.9 oz (102 kg)              We Performed the Following     HC HEARING DEVICE BATTERY        Primary Care Provider Office Phone # Fax #    Kd Leong [phone]       [facility] [address] [facility] [address]        Equal Access to Services     ANTIONETTE IRBY : Hadii aad ku hadasho Sojossy, waaxda luqadaha, qaybta kaalmada obed, nitin craig. So [facility] [phone].    ATENCIÓN: Si habla español, tiene a terry disposición servicios gratuitos de asistencia lingüística. [facility] [phone].    We comply with applicable federal civil rights laws and [place] laws. We do not discriminate on the basis of race, color, national origin, age, disability, sex, sexual orientation, or gender identity.            Thank you!     Thank you for choosing [facility]  for your care. Our goal is always to provide you with excellent care. Hearing back from our patients is one way we can continue to improve our services. Please take a few minutes to complete the written survey that you may receive in the mail after your visit with us. Thank you!             Your Updated Medication List - Protect others around you: Learn how to safely use, store and  throw away your medicines at www.disposemymeds.org.          This list is accurate as of 4/12/18  1:24 PM.  Always use your most recent med list.                   Brand Name Dispense Instructions for use Diagnosis    acetaminophen 500 MG tablet    TYLENOL     Take 500-1,000 mg by mouth every 6 hours as needed for mild pain        * albuterol 108 (90 Base) MCG/ACT Inhaler    PROAIR HFA/PROVENTIL HFA/VENTOLIN HFA    3 Inhaler    Inhale 2 puffs into the lungs every 6 hours as needed for shortness of breath / dyspnea or wheezing    Mild persistent asthma without complication       * albuterol (2.5 MG/3ML) 0.083% neb solution     1 Box    Take 1 vial (2.5 mg) by nebulization every 6 hours as needed for shortness of breath / dyspnea or wheezing    Mild persistent asthma without complication       allopurinol 100 MG tablet    ZYLOPRIM    90 tablet    TAKE ONE TABLET BY MOUTH ONE TIME DAILY    Acute gouty arthritis       ARIPiprazole 5 MG tablet    ABILIFY    180 tablet    Take 1.5 tablets (7.5 mg) by mouth At Bedtime    Major depressive disorder, recurrent episode, moderate (H)       buPROPion 150 MG 24 hr tablet    WELLBUTRIN XL    90 tablet    TAKE  ONE TABLET BY MOUTH EVERY DAY IN THE MORNING    COPD exacerbation (H)       calcium citrate-vitamin D 315-250 MG-UNIT Tabs per tablet    CITRACAL     Take 2 tablets by mouth daily        EPINEPHrine 0.3 MG/0.3ML injection 2-pack    EPIPEN/ADRENACLICK/or ANY BX GENERIC EQUIV    0.6 mL    Inject 0.3 mLs (0.3 mg) into the muscle once as needed for anaphylaxis    Anaphylactic reaction to bee sting, undetermined intent, subsequent encounter       fluticasone 50 MCG/ACT spray    FLONASE    1 Bottle    Spray 1-2 sprays into both nostrils daily    Nasal congestion       furosemide 20 MG tablet    LASIX    180 tablet    TAKE ONE TABLET BY MOUTH TWICE DAILY    Lymphedema of both lower extremities       gabapentin 600 MG tablet    NEURONTIN    90 tablet    TAKE ONE TABLET BY MOUTH  THREE TIMES DAILY    Polyneuropathy in other diseases classified elsewhere (H)       ipratropium - albuterol 0.5 mg/2.5 mg/3 mL 0.5-2.5 (3) MG/3ML neb solution    DUONEB    30 vial    Take 1 vial (3 mLs) by nebulization every 6 hours as needed for shortness of breath / dyspnea or wheezing    Chronic obstructive pulmonary disease, unspecified COPD type (H)       lamoTRIgine 200 MG tablet    LaMICtal    90 tablet    Take 1 tablet (200 mg) by mouth daily    Major depressive disorder, recurrent episode, moderate (H)       levofloxacin 750 MG tablet    LEVAQUIN    10 tablet    Take 1 tablet (750 mg) by mouth daily    COPD exacerbation (H), Chronic obstructive pulmonary disease, unspecified COPD type (H)       levothyroxine 150 MCG tablet    SYNTHROID/LEVOTHROID    90 tablet    TAKE ONE TABLET BY MOUTH DAILY    Hypothyroidism, unspecified type       metoclopramide 10 MG tablet    REGLAN    40 tablet    Take 1 tablet (10 mg) by mouth 4 times daily as needed    Flatulence, eructation, and gas pain       mometasone-formoterol 200-5 MCG/ACT oral inhaler    DULERA    13 g    Inhale 2 puffs into the lungs 2 times daily    COPD exacerbation (H), Chronic obstructive pulmonary disease, unspecified COPD type (H)       omeprazole 20 MG CR capsule    priLOSEC    180 capsule    TAKE 1 CAPSULE (20 MG) BY MOUTH 2 TIMES DAILY    Gastroesophageal reflux disease without esophagitis       ondansetron 4 MG tablet    ZOFRAN    10 tablet    Take 1 tablet (4 mg) by mouth every 8 hours as needed for nausea    Nausea and vomiting, intractability of vomiting not specified, unspecified vomiting type       * order for DME      Equipment being ordered: CPAP AIRSENSE 10 5-18 CM H20 # [id number]   DN# [id number]        * order for DME     1 Month    Equipment being ordered: INCONTINENCE PADS  QID    Other urinary incontinence       * order for DME     60 Month    Equipment being ordered: DEPENDS SIZE LARGE    Mixed incontinence       * order for DME     2  "Units    Equipment being ordered: [id number]\" x2pair   \"20-30mmHg [facility] Hybrid Liners\" x 2 pair    Peripheral edema       order for DME     1 Device    Equipment being ordered: Nebulizer    COPD exacerbation (H), Chronic obstructive pulmonary disease, unspecified COPD type (H)       predniSONE 20 MG tablet    DELTASONE    24 tablet    Take 60mg/day for 4 days, take 40mg/day for 4 days, take 20mg/day for 4 days, then stop    COPD exacerbation (H), Chronic obstructive pulmonary disease, unspecified COPD type (H)       simvastatin 20 MG tablet    ZOCOR    30 tablet    TAKE ONE TABLET BY MOUTH NIGHTLY AT BEDTIME    Hypercholesteremia       traZODone 150 MG tablet    DESYREL    30 tablet    TAKE ONE TABLET BY MOUTH NIGHTLY AT BEDTIME    Major depressive disorder, recurrent episode, moderate (H)       warfarin 5 MG tablet    COUMADIN    180 tablet    12.5 mg on Wed and Sat 10 mg all other days    DVT (deep venous thrombosis) (H)       * Notice:  This list has 6 medication(s) that are the same as other medications prescribed for you. Read the directions carefully, and ask your doctor or other care provider to review them with you.      "

## 2018-04-12 NOTE — MR AVS SNAPSHOT
Bia S Bill   4/12/2018   Anticoagulation Therapy Visit    Description:  52 year old female   Provider:  Mireille Torres, RN   Department:  [facility]           INR as of 4/12/2018     Today's INR 1.68!      Anticoagulation Summary as of 4/12/2018     INR goal 2.0-2.5   Today's INR 1.68!   Full instructions 4/12: 15 mg; 4/14: 10 mg; 4/16: 12.5 mg; 4/18: 10 mg; Otherwise 12.5 mg on Wed, Sat; 10 mg all other days   Next INR check 4/19/2018    Indications   Long term current use of anticoagulant therapy [Z79.01]  DVT (deep venous thrombosis) (H) [I82.409]  PVD (peripheral vascular disease) with claudication (H) [I73.9]         Description     levaquin done two days ago . Tomorrow morning last day of prednisone      April 2018 Details    Sun Mon Tue Wed Thu Fri Sat     1               2               3               4               5               6               7                 8               9               10               11               12      15 mg   See details      13      10 mg         14      10 mg           15      10 mg         16      12.5 mg         17      10 mg         18      10 mg         19            20               21                 22               23               24               25               26               27               28                 29               30                     Date Details   04/12 This INR check       Date of next INR:  4/19/2018         How to take your warfarin dose     To take:  10 mg Take 2 of the 5 mg tablets.    To take:  12.5 mg Take 2.5 of the 5 mg tablets.    To take:  15 mg Take 3 of the 5 mg tablets.

## 2018-04-12 NOTE — PROGRESS NOTES
52 year old female drops in for hearing aid batteries. Patient is using 2007 Unitron BTE hearing aids bilaterally.     Billed insurance for 24 size 13 hearing aid batteries.    Recommend hearing evaluation and newer hearing aid technology.     See chart in the hearing aid room.     Rhiannon SARGENT-FIDEL, #[id number]

## 2018-04-16 ENCOUNTER — HOSPITAL ENCOUNTER (OUTPATIENT)
Dept: RESPIRATORY THERAPY | Facility: CLINIC | Age: 53
Discharge: HOME | End: 2018-04-16
Attending: INTERNAL MEDICINE | Admitting: INTERNAL MEDICINE
Payer: COMMERCIAL

## 2018-04-16 PROCEDURE — 25000125 ZZHC RX 250: Performed by: FAMILY MEDICINE

## 2018-04-16 PROCEDURE — 94060 EVALUATION OF WHEEZING: CPT

## 2018-04-16 PROCEDURE — 94060 EVALUATION OF WHEEZING: CPT | Mod: 26 | Performed by: INTERNAL MEDICINE

## 2018-04-16 RX ORDER — ALBUTEROL SULFATE 0.83 MG/ML
2.5 SOLUTION RESPIRATORY (INHALATION) ONCE
Status: COMPLETED | OUTPATIENT
Start: 2018-04-16 | End: 2018-04-16

## 2018-04-16 RX ADMIN — ALBUTEROL SULFATE 2.5 MG: 2.5 SOLUTION RESPIRATORY (INHALATION) at 09:30

## 2018-04-17 ENCOUNTER — OFFICE VISIT (OUTPATIENT)
Dept: AUDIOLOGY | Facility: CLINIC | Age: 53
End: 2018-04-17
Payer: COMMERCIAL

## 2018-04-17 DIAGNOSIS — H90.A12 CONDUCTIVE HEARING LOSS OF LEFT EAR WITH RESTRICTED HEARING OF RIGHT EAR: ICD-10-CM

## 2018-04-17 DIAGNOSIS — H90.A21 SENSORINEURAL HEARING LOSS (SNHL) OF RIGHT EAR WITH RESTRICTED HEARING OF LEFT EAR: Primary | ICD-10-CM

## 2018-04-17 LAB
EXPTIME-PRE: 5.55 SEC
FEF2575-%PRED-POST: 108 %
FEF2575-%PRED-PRE: 78 %
FEF2575-POST: 2.58 L/SEC
FEF2575-PRE: 1.86 L/SEC
FEF2575-PRED: 2.38 L/SEC
FEFMAX-%PRED-PRE: 72 %
FEFMAX-PRE: 4.29 L/SEC
FEFMAX-PRED: 5.95 L/SEC
FEV1-%PRED-PRE: 62 %
FEV1-PRE: 1.44 L
FEV1FEV6-PRE: 86 %
FEV1FEV6-PRED: 82 %
FEV1FVC-PRE: 86 %
FEV1FVC-PRED: 80 %
FIFMAX-PRE: 2.36 L/SEC
FVC-%PRED-PRE: 58 %
FVC-PRE: 1.67 L
FVC-PRED: 2.84 L

## 2018-04-17 PROCEDURE — 92557 COMPREHENSIVE HEARING TEST: CPT | Performed by: AUDIOLOGIST

## 2018-04-17 PROCEDURE — 92550 TYMPANOMETRY & REFLEX THRESH: CPT | Performed by: AUDIOLOGIST

## 2018-04-17 PROCEDURE — 99207 ZZC NO CHARGE LOS: CPT | Performed by: AUDIOLOGIST

## 2018-04-17 NOTE — MR AVS SNAPSHOT
After Visit Summary   4/17/2018    Bia Bill    MRN: [id number]           Patient Information     Date Of Birth          1965        Visit Information        Provider Department      4/17/2018 11:30 AM Rhiannon Fong [facility]        Today's Diagnoses     Sensorineural hearing loss (SNHL) of right ear with restricted hearing of left ear    -  1    Conductive hearing loss of left ear with restricted hearing of right ear           Follow-ups after your visit        Your next 10 appointments already scheduled     Apr 19, 2018  9:30 AM CDT   Return Visit with Kd Morris   [facility] ([facility])    [address] [address]   [phone]            Apr 24, 2018  1:45 PM CDT   Return Visit with Anila Hardy MD   [facility]    [address] [address]   [phone]            May 03, 2018  9:30 AM CDT   Return Visit with Kd Morris   [facility] ([facility])    [address] [address]   [phone]            May 03, 2018 11:00 AM CDT   New Visit with Rajinder Bose MD   [facility] ([facility])    [address] [address]   [phone]            Jun 22, 2018  1:00 PM CDT   LAB with [facility] ([facility])    [address] [address]   [phone]           Please do not eat 10-12 hours before your appointment if you are coming in fasting for labs on lipids, cholesterol, or glucose (sugar). This does not apply to pregnant women. Water, hot tea and black coffee (with nothing added) are okay. Do not drink other fluids, diet soda or chew gum.            Sep 21, 2018  1:00 PM CDT   LAB with [facility] ([facility])    [address] [place] [address]  "[place]  [facility] [address]   [phone]           Please do not eat 10-12 hours before your appointment if you are coming in fasting for labs on lipids, cholesterol, or glucose (sugar). This does not apply to pregnant women. Water, hot tea and black coffee (with nothing added) are okay. Do not drink other fluids, diet soda or chew gum.            Sep 28, 2018  1:30 PM CDT   Return Visit with Joshua Watts MD   [facility] ([facility])    [facility]  [address]  [facility] [address]   [phone]              Who to contact     If you have questions or need follow up information about today's clinic visit or your schedule please contact [facility] directly at [phone].  Normal or non-critical lab and imaging results will be communicated to you by MyChart, letter or phone within 4 business days after the clinic has received the results. If you do not hear from us within 7 days, please contact the clinic through MyChart or phone. If you have a critical or abnormal lab result, we will notify you by phone as soon as possible.  Submit refill requests through [employer] or call your pharmacy and they will forward the refill request to us. Please allow 3 business days for your refill to be completed.          Additional Information About Your Visit        MyChart Information     [employer] lets you send messages to your doctor, view your test results, renew your prescriptions, schedule appointments and more. To sign up, go to [email or web address] . Click on \"Log in\" on the left side of the screen, which will take you to the Welcome page. Then click on \"Sign up Now\" on the right side of the page.     You will be asked to enter the access code listed below, as well as some personal information. Please follow the directions to create your username and password.     Your access code is: [email or web address]  Expires: 4/26/2018  1:16 PM     Your " access code will  in 90 days. If you need help or a new code, please call your [place] clinic or [phone].        Care EveryWhere ID     This is your Care EveryWhere ID. This could be used by other organizations to access your [place] medical records  [id number]        Your Vitals Were     Last Period                   2009            Blood Pressure from Last 3 Encounters:   18 132/73   18 118/55   18 124/72    Weight from Last 3 Encounters:   18 223 lb 14.4 oz (101.6 kg)   18 224 lb 14.4 oz (102 kg)   18 224 lb 13.9 oz (102 kg)              We Performed the Following     AUDIOGRAM/TYMPANOGRAM - INTERFACE     COMPREHENSIVE HEARING TEST     TYMPANOMETRY AND REFLEX THRESHOLD MEASUREMENTS        Primary Care Provider Office Phone # Fax #    Kd Leong [phone]       [facility] [address] [facility] [address]        Equal Access to Services     ANTIONETTE IRBY : Hadii aad ku hadasho Soomaali, waaxda luqadaha, qaybta kaalmada adeegyada, waxay idiin hayaan adeeg kharaantelmo la'andrewn . So [facility] [phone].    ATENCIÓN: Si habla español, tiene a terry disposición servicios gratuitos de asistencia lingüística. Natalie al [phone].    We comply with applicable federal civil rights laws and [place] laws. We do not discriminate on the basis of race, color, national origin, age, disability, sex, sexual orientation, or gender identity.            Thank you!     Thank you for choosing [facility]  for your care. Our goal is always to provide you with excellent care. Hearing back from our patients is one way we can continue to improve our services. Please take a few minutes to complete the written survey that you may receive in the mail after your visit with us. Thank you!             Your Updated Medication List - Protect others around you: Learn how to safely use, store and throw away your medicines at www.disposemymeds.org.           This list is accurate as of 4/17/18  1:55 PM.  Always use your most recent med list.                   Brand Name Dispense Instructions for use Diagnosis    acetaminophen 500 MG tablet    TYLENOL     Take 500-1,000 mg by mouth every 6 hours as needed for mild pain        * albuterol 108 (90 Base) MCG/ACT Inhaler    PROAIR HFA/PROVENTIL HFA/VENTOLIN HFA    3 Inhaler    Inhale 2 puffs into the lungs every 6 hours as needed for shortness of breath / dyspnea or wheezing    Mild persistent asthma without complication       * albuterol (2.5 MG/3ML) 0.083% neb solution     1 Box    Take 1 vial (2.5 mg) by nebulization every 6 hours as needed for shortness of breath / dyspnea or wheezing    Mild persistent asthma without complication       allopurinol 100 MG tablet    ZYLOPRIM    90 tablet    TAKE ONE TABLET BY MOUTH ONE TIME DAILY    Acute gouty arthritis       ARIPiprazole 5 MG tablet    ABILIFY    180 tablet    Take 1.5 tablets (7.5 mg) by mouth At Bedtime    Major depressive disorder, recurrent episode, moderate (H)       buPROPion 150 MG 24 hr tablet    WELLBUTRIN XL    90 tablet    TAKE  ONE TABLET BY MOUTH EVERY DAY IN THE MORNING    COPD exacerbation (H)       calcium citrate-vitamin D 315-250 MG-UNIT Tabs per tablet    CITRACAL     Take 2 tablets by mouth daily        EPINEPHrine 0.3 MG/0.3ML injection 2-pack    EPIPEN/ADRENACLICK/or ANY BX GENERIC EQUIV    0.6 mL    Inject 0.3 mLs (0.3 mg) into the muscle once as needed for anaphylaxis    Anaphylactic reaction to bee sting, undetermined intent, subsequent encounter       fluticasone 50 MCG/ACT spray    FLONASE    1 Bottle    Spray 1-2 sprays into both nostrils daily    Nasal congestion       furosemide 20 MG tablet    LASIX    180 tablet    TAKE ONE TABLET BY MOUTH TWICE DAILY    Lymphedema of both lower extremities       gabapentin 600 MG tablet    NEURONTIN    90 tablet    TAKE ONE TABLET BY MOUTH THREE TIMES DAILY    Polyneuropathy in other diseases  "classified elsewhere (H)       ipratropium - albuterol 0.5 mg/2.5 mg/3 mL 0.5-2.5 (3) MG/3ML neb solution    DUONEB    30 vial    Take 1 vial (3 mLs) by nebulization every 6 hours as needed for shortness of breath / dyspnea or wheezing    Chronic obstructive pulmonary disease, unspecified COPD type (H)       lamoTRIgine 200 MG tablet    LaMICtal    90 tablet    Take 1 tablet (200 mg) by mouth daily    Major depressive disorder, recurrent episode, moderate (H)       levofloxacin 750 MG tablet    LEVAQUIN    10 tablet    Take 1 tablet (750 mg) by mouth daily    COPD exacerbation (H), Chronic obstructive pulmonary disease, unspecified COPD type (H)       levothyroxine 150 MCG tablet    SYNTHROID/LEVOTHROID    90 tablet    TAKE ONE TABLET BY MOUTH DAILY    Hypothyroidism, unspecified type       metoclopramide 10 MG tablet    REGLAN    40 tablet    Take 1 tablet (10 mg) by mouth 4 times daily as needed    Flatulence, eructation, and gas pain       mometasone-formoterol 200-5 MCG/ACT oral inhaler    DULERA    13 g    Inhale 2 puffs into the lungs 2 times daily    COPD exacerbation (H), Chronic obstructive pulmonary disease, unspecified COPD type (H)       omeprazole 20 MG CR capsule    priLOSEC    180 capsule    TAKE 1 CAPSULE (20 MG) BY MOUTH 2 TIMES DAILY    Gastroesophageal reflux disease without esophagitis       ondansetron 4 MG tablet    ZOFRAN    10 tablet    Take 1 tablet (4 mg) by mouth every 8 hours as needed for nausea    Nausea and vomiting, intractability of vomiting not specified, unspecified vomiting type       * order for DME      Equipment being ordered: CPAP AIRSENSE 10 5-18 CM H20 # [id number]   DN# [id number]        * order for DME     1 Month    Equipment being ordered: INCONTINENCE PADS  QID    Other urinary incontinence       * order for DME     60 Month    Equipment being ordered: DEPENDS SIZE LARGE    Mixed incontinence       * order for DME     2 Units    Equipment being ordered: [id number]\" " "x2pair   \"20-30mmHg [facility] Hybrid Liners\" x 2 pair    Peripheral edema       order for DME     1 Device    Equipment being ordered: Nebulizer    COPD exacerbation (H), Chronic obstructive pulmonary disease, unspecified COPD type (H)       predniSONE 20 MG tablet    DELTASONE    24 tablet    Take 60mg/day for 4 days, take 40mg/day for 4 days, take 20mg/day for 4 days, then stop    COPD exacerbation (H), Chronic obstructive pulmonary disease, unspecified COPD type (H)       simvastatin 20 MG tablet    ZOCOR    30 tablet    TAKE ONE TABLET BY MOUTH NIGHTLY AT BEDTIME    Hypercholesteremia       traZODone 150 MG tablet    DESYREL    30 tablet    TAKE ONE TABLET BY MOUTH NIGHTLY AT BEDTIME    Major depressive disorder, recurrent episode, moderate (H)       warfarin 5 MG tablet    COUMADIN    180 tablet    12.5 mg on Wed and Sat 10 mg all other days    DVT (deep venous thrombosis) (H)       * Notice:  This list has 6 medication(s) that are the same as other medications prescribed for you. Read the directions carefully, and ask your doctor or other care provider to review them with you.      "

## 2018-04-17 NOTE — PROGRESS NOTES
AUDIOLOGY REPORT    SUBJECTIVE:  Bia Bill is a 52 year old female who was seen in the Audiology Clinic at [facility] for an audiologic evaluation, referred by Dr. Joanna Magdaleno.  The patient has been seen previously in this clinic for assessment.  The patient reports she is hearing better in her right ear. She reports difficulty hearing in a variety of environments. The patient denies bilateral tinnitus, bilateral otalgia and bilateral drainage.     OBJECTIVE:  Otoscopic exam indicates ears are clear of cerumen bilaterally     Pure Tone Thresholds assessed using conventional audiometry with fair to good  reliability from 250-8000 Hz bilaterally using circumaural headphones     RIGHT:  normal and mild sensorineural hearing loss    LEFT:    mild and moderate conductive hearing loss    Tympanogram:    RIGHT: normal eardrum mobility, acoustic reflexes absent    LEFT:   negative pressure , acoustic reflexes absent    Speech Reception Threshold:    RIGHT: 15 dB HL    LEFT:   40 dB HL  Word Recognition Score:     RIGHT: 92% at 55 dB HL using W22 recorded word list.    LEFT:   76% at 70 dB HL using W22 recorded word list.    Audiometric Leonard: Lateralizes to the left ear at 250-4000 Hz      ASSESSMENT:   Normal sloping to mild sensorineural hearing loss in the right ear with a mild to moderate conductive hearing loss in the right ear.     Today s results were discussed with the patient in detail.     PLAN: It is recommended that the patient be seen by Dr. Hardy for medical evaluation of their ears and hearing evaluation. Patient was counseled regarding hearing loss and impact on communication. May consider new amplification after medical evaluation of her ears.  Please call this clinic with questions regarding these results or recommendations.        Rhiannon Fong M.A. [facility]  Clinical audiologist [place] # [id number]  4/17/2018

## 2018-04-18 DIAGNOSIS — G63 POLYNEUROPATHY IN OTHER DISEASES CLASSIFIED ELSEWHERE (H): Chronic | ICD-10-CM

## 2018-04-18 RX ORDER — GABAPENTIN 600 MG/1
TABLET ORAL
Qty: 90 TABLET | Refills: 0 | Status: SHIPPED | OUTPATIENT
Start: 2018-04-18 | End: 2018-05-15

## 2018-04-18 NOTE — TELEPHONE ENCOUNTER
GABAPENTIN 600 MG TAB ASCE        Last Written Prescription Date:  3/19/18  Last Fill Quantity: 90,   # refills: 0  Last Office Visit: 3/30/18  Future Office visit:    Next 5 appointments (look out 90 days)     Apr 19, 2018  9:30 AM CDT   Return Visit with Kd Morris   [facility]    [address] [address]   [phone]            Apr 24, 2018  1:45 PM CDT   Return Visit with Anila Hardy MD   [facility]    [address] [address]   [phone]            May 03, 2018  9:30 AM CDT   Return Visit with Kd Morris   [facility] ([facility])    [address] [place] [address]   [phone]                   Routing refill request to provider for review/approval because:  Drug not on the [facility], [facility] or [facility] refill protocol or controlled substance

## 2018-04-19 ENCOUNTER — OFFICE VISIT (OUTPATIENT)
Dept: PSYCHOLOGY | Facility: CLINIC | Age: 53
End: 2018-04-19
Payer: COMMERCIAL

## 2018-04-19 ENCOUNTER — ANTICOAGULATION THERAPY VISIT (OUTPATIENT)
Dept: ANTICOAGULATION | Facility: CLINIC | Age: 53
End: 2018-04-19

## 2018-04-19 ENCOUNTER — TELEPHONE (OUTPATIENT)
Dept: FAMILY MEDICINE | Facility: CLINIC | Age: 53
End: 2018-04-19

## 2018-04-19 DIAGNOSIS — I82.409 DVT (DEEP VENOUS THROMBOSIS) (H): ICD-10-CM

## 2018-04-19 DIAGNOSIS — Z79.01 LONG TERM CURRENT USE OF ANTICOAGULANT THERAPY: ICD-10-CM

## 2018-04-19 DIAGNOSIS — I73.9 PVD (PERIPHERAL VASCULAR DISEASE) WITH CLAUDICATION (H): ICD-10-CM

## 2018-04-19 DIAGNOSIS — I89.0 LYMPHEDEMA OF BOTH LOWER EXTREMITIES: Primary | ICD-10-CM

## 2018-04-19 DIAGNOSIS — F31.62 MODERATE MIXED BIPOLAR I DISORDER (H): Primary | ICD-10-CM

## 2018-04-19 DIAGNOSIS — D75.1 ERYTHROCYTOSIS: Chronic | ICD-10-CM

## 2018-04-19 DIAGNOSIS — I73.9 PERIPHERAL VASCULAR DISEASE WITH CLAUDICATION (H): ICD-10-CM

## 2018-04-19 LAB
HCT VFR BLD AUTO: 59.8 % (ref 35–47)
HGB BLD-MCNC: 18.9 G/DL (ref 11.7–15.7)
INR PPP: 2.66 (ref 0.86–1.14)

## 2018-04-19 PROCEDURE — 85014 HEMATOCRIT: CPT | Performed by: FAMILY MEDICINE

## 2018-04-19 PROCEDURE — 85018 HEMOGLOBIN: CPT | Performed by: FAMILY MEDICINE

## 2018-04-19 PROCEDURE — 85610 PROTHROMBIN TIME: CPT | Performed by: FAMILY MEDICINE

## 2018-04-19 PROCEDURE — 99207 ZZC NO CHARGE NURSE ONLY: CPT

## 2018-04-19 PROCEDURE — 90834 PSYTX W PT 45 MINUTES: CPT | Performed by: PSYCHOLOGIST

## 2018-04-19 PROCEDURE — 36415 COLL VENOUS BLD VENIPUNCTURE: CPT | Performed by: FAMILY MEDICINE

## 2018-04-19 RX ORDER — WARFARIN SODIUM 5 MG/1
TABLET ORAL
Qty: 180 TABLET | Refills: 2 | COMMUNITY
Start: 2018-04-19 | End: 2018-10-22

## 2018-04-19 NOTE — MR AVS SNAPSHOT
MRN:[id number]                      After Visit Summary   4/19/2018    Bia Bill    MRN: [id number]           Visit Information        Provider Department      4/19/2018 9:30 AM Kd Morris [facility]      Your next 10 appointments already scheduled     Apr 24, 2018  1:45 PM CDT   Return Visit with Anila Hardy MD   [facility]    [address] [address]   [phone]            Apr 26, 2018  9:30 AM CDT   Lymphedema Evaluation with Joseline Santana, PT   [facility] ([facility])    [address] [address]   [phone]            May 03, 2018  9:30 AM CDT   Return Visit with Kd Morris   [facility] ([facility])    [address] [address]   [phone]            May 03, 2018 11:00 AM CDT   New Visit with Rajinder Bose MD   [facility] ([facility])    [address] [address]   [phone]            May 21, 2018  9:30 AM CDT   Return Visit with Kd Morris   [facility] ([facility])    [address] [address]   [phone]            Jun 22, 2018  1:00 PM CDT   LAB with [facility] ([facility])    [address] [address]   [phone]           Please do not eat 10-12 hours before your appointment if you are coming in fasting for labs on lipids, cholesterol, or glucose (sugar). This does not apply to pregnant women. Water, hot tea and black coffee (with nothing added) are okay. Do not drink other fluids, diet soda or chew gum.            Sep 21, 2018  1:00 PM CDT   LAB with [facility] ([facility])    [address] [address]  "  [phone]           Please do not eat 10-12 hours before your appointment if you are coming in fasting for labs on lipids, cholesterol, or glucose (sugar). This does not apply to pregnant women. Water, hot tea and black coffee (with nothing added) are okay. Do not drink other fluids, diet soda or chew gum.            Sep 28, 2018  1:30 PM CDT   Return Visit with Joshua Watts MD   [facility] ([facility])    [facility]  [address] [address]   [phone]              MyChart Information     [employer] lets you send messages to your doctor, view your test results, renew your prescriptions, schedule appointments and more. To sign up, go to [email or web address] . Click on \"Log in\" on the left side of the screen, which will take you to the Welcome page. Then click on \"Sign up Now\" on the right side of the page.     You will be asked to enter the access code listed below, as well as some personal information. Please follow the directions to create your username and password.     Your access code is: [email or web address]  Expires: 2018  1:16 PM     Your access code will  in 90 days. If you need help or a new code, please call your [place] clinic or [phone].        Care EveryWhere ID     This is your Care EveryWhere ID. This could be used by other organizations to access your [place] medical records  [id number]        Equal Access to Services     [facility]: Hadii aad ku hadasho Soomaali, waaxda luqadaha, qaybta kaalmada adeegyada, waxmary ellen roberts . So [facility] [phone].    ATENCIÓN: Si habla español, tiene a terry disposición servicios gratuitos de asistencia lingüística. Llame al [phone].    We comply with applicable federal civil rights laws and [place] laws. We do not discriminate on the basis of race, color, national origin, age, disability, sex, sexual orientation, or gender identity.       "

## 2018-04-19 NOTE — TELEPHONE ENCOUNTER
Reason for Call: Request for an order or referral:    Order or referral being requested: Bia requesting referral for lymphedema clinic for her swollen legs.  She spoke with them today and she needs referral for [place].  Please review and place referral if appropriate.  Thank you..Lizzette Mcintosh    Date needed: as soon as possible    Has the patient been seen by the PCP for this problem? YES    Phone number Patient can be reached at:  Home number on file [phone] (home)    Best Time:  Any time    Can we leave a detailed message on this number?  YES    Call taken on 4/19/2018 at 2:02 PM by Lizzette Mcintosh

## 2018-04-19 NOTE — MR AVS SNAPSHOT
Bia Bill   4/19/2018   Anticoagulation Therapy Visit    Description:  52 year old female   Provider:  Angelina Nj, RN   Department:  [facility]           INR as of 4/19/2018     Today's INR 2.66!      Anticoagulation Summary as of 4/19/2018     INR goal 2.0-2.5   Today's INR 2.66!   Full instructions 12.5 mg on Wed, Sat; 10 mg all other days   Next INR check 5/3/2018    Indications   Long term current use of anticoagulant therapy [Z79.01]  DVT (deep venous thrombosis) (H) [I82.409]  PVD (peripheral vascular disease) with claudication (H) [I73.9]         April 2018 Details    Sun Mon Tue Wed Thu Fri Sat     1               2               3               4               5               6               7                 8               9               10               11               12               13               14                 15               16               17               18               19      10 mg   See details      20      10 mg         21      12.5 mg           22      10 mg         23      10 mg         24      10 mg         25      12.5 mg         26      10 mg         27      10 mg         28      12.5 mg           29      10 mg         30      10 mg               Date Details   04/19 This INR check               How to take your warfarin dose     To take:  10 mg Take 2 of the 5 mg tablets.    To take:  12.5 mg Take 2.5 of the 5 mg tablets.           May 2018 Details    Sun Mon Tue Wed Thu Fri Sat       1      10 mg         2      12.5 mg         3            4               5                 6               7               8               9               10               11               12                 13               14               15               16               17               18               19                 20               21               22               23               24               25               26                 27               28                29               30               31                  Date Details   No additional details    Date of next INR:  5/3/2018         How to take your warfarin dose     To take:  10 mg Take 2 of the 5 mg tablets.    To take:  12.5 mg Take 2.5 of the 5 mg tablets.

## 2018-04-19 NOTE — PROGRESS NOTES
ANTICOAGULATION FOLLOW-UP CLINIC VISIT    Patient Name:  Bia Bill  Date:  4/19/2018  Contact Type:  Telephone    SUBJECTIVE:     Patient Findings     Positives No Problem Findings    Comments Patient is off levaquin and prednisone. She has no other medication changes reported, denies missed warfarin doses. Patient will resume her usual warfarin dose and recheck INR in 2 weeks. No issues with bleeding or unusual bruising noted.           OBJECTIVE    INR   Date Value Ref Range Status   04/19/2018 2.66 (H) 0.86 - 1.14 Final     Comment:     Special tube used to correct for high hematocrit       ASSESSMENT / PLAN  No question data found.  Anticoagulation Summary as of 4/19/2018     INR goal 2.0-2.5   Today's INR 2.66!   Maintenance plan 12.5 mg (5 mg x 2.5) on Wed, Sat; 10 mg (5 mg x 2) all other days   Full instructions 12.5 mg on Wed, Sat; 10 mg all other days   Weekly total 75 mg   No change documented Angelina Nj RN   Plan last modified Mireille Torres RN (12/27/2017)   Next INR check 5/3/2018   Priority INR   Target end date Indefinite    Indications   Long term current use of anticoagulant therapy [Z79.01]  DVT (deep venous thrombosis) (H) [I82.409]  PVD (peripheral vascular disease) with claudication (H) [I73.9]         Anticoagulation Episode Summary     INR check location     Preferred lab     [facility] [place] [facility]    Comments * lab draw due to elevated hematocrit (fingerstick meters don't work). LEFT LE. STENT x 3 LEFT UPPER LEG. Previous arterial clot. New goal range 2-2.5 starting 11/6/17.      Anticoagulation Care Providers     Provider Role Specialty Phone number    Kd Leong MD [facility] [phone]            See the Encounter Report to view Anticoagulation Flowsheet and Dosing Calendar (Go to Encounters tab in chart review, and find the Anticoagulation Therapy Visit)        Angelina Nj RN,  CACP

## 2018-04-20 ASSESSMENT — ANXIETY QUESTIONNAIRES
6. BECOMING EASILY ANNOYED OR IRRITABLE: MORE THAN HALF THE DAYS
2. NOT BEING ABLE TO STOP OR CONTROL WORRYING: SEVERAL DAYS
7. FEELING AFRAID AS IF SOMETHING AWFUL MIGHT HAPPEN: NOT AT ALL
3. WORRYING TOO MUCH ABOUT DIFFERENT THINGS: SEVERAL DAYS
1. FEELING NERVOUS, ANXIOUS, OR ON EDGE: MORE THAN HALF THE DAYS
5. BEING SO RESTLESS THAT IT IS HARD TO SIT STILL: MORE THAN HALF THE DAYS
GAD7 TOTAL SCORE: 10
4. TROUBLE RELAXING: MORE THAN HALF THE DAYS

## 2018-04-20 NOTE — PROGRESS NOTES
Progress Note  Disclaimer: This note consists of symbols derived from keyboarding, dictation and/or voice recognition software. As a result, there may be errors in the script that have gone undetected. Please consider this when interpreting information found in this chart.    Client Name: Bia Bill  Date: 4/3/2018 4/19/2018         Service Type: Individual      Session Start Time: 9:30 AM  Session End Time: 10:15 AM      Session Length: 45     Session #: 29     Attendees: Client attended alone    Treatment Plan Last Reviewed: 4/3/2018  PHQ-9 / CHIN-7 : See flowsheet     DATA   Client reports she has been having trouble with cellulitis on her foot.  We talked about the importance of quitting smoking in relation to this.  Client reported she has spent a lot of time with her daughter recently.  Her daughter has been talking about her own trauma and this has kicked off client recollections of her own trauma.. Spent some time talking about grounding techniques   Progress Since Last Session (Related to Symptoms / Goals / Homework):   Symptoms: Stable    Homework: Achieved / completed to satisfaction      Episode of Care Goals: Satisfactory progress - ACTION (Actively working towards change); Intervened by reinforcing change plan / affirming steps taken     Current / Ongoing Stressors and Concerns:   Recurrent depression  family relational problems, medical problems, chronic pain, trauma history      Treatment Objective(s) Addressed in This Session:   Increase interest, engagement, and pleasure in doing things  Decrease frequency and intensity of feeling down, depressed, hopeless       Intervention:   CBT: Behavioral activation reviewed grounding techniques  ASSESSMENT: Current Emotional / Mental Status (status of significant symptoms):   Risk status (Self / Other harm or suicidal ideation)   Client denies current fears or concerns for personal safety.   Client denies  current or recent suicidal ideation or behaviors.   Client denies current or recent homicidal ideation or behaviors.   Client denies current or recent self injurious behavior or ideation.   Client denies other safety concerns.   A safety and risk management plan has not been developed at this time, however client was given the after-hours number / 911 should there be a change in any of these risk factors.     Appearance:   Appropriate    Eye Contact:   Good    Psychomotor Behavior: Normal    Attitude:   Cooperative    Orientation:   All   Speech    Rate / Production: Normal     Volume:  Normal    Mood:    Normal   Affect:    Appropriate    Thought Content:  Clear    Thought Form:  Coherent  Logical    Insight:    Good      Medication Review:   No changes to current psychiatric medication(s)     Medication Compliance:   Yes     Changes in Health Issues:   None reported     Chemical Use Review:   Substance Use: Chemical use reviewed, no active concerns identified      Tobacco Use: Client reports no smoking for the last week     Collateral Reports Completed:   Not Applicable    PLAN: (Client Tasks / Therapist Tasks / Other)  Client to continue With her self-care routine and maintenance of boundaries.   Maintain medication compliance and contact with psychiatry.  Take care of herself physically.  Practice one to 2 grounding techniques each day.   Kd Morris                                                         ________________________________________________________________________    Treatment Plan    Client's Name: Bia Bill  YOB: 1965    Date: 10/24/2017      DSM5 Diagnoses: (Sustained by DSM5 Criteria Listed Above)  Diagnoses: 296.40 Bipolar I Disorder Current or Most Recent Episode Manic, Unspecified  Psychosocial & Contextual Factors: financial difficulties, chronic pain, roommate issues  WHODAS 2.0 (12 item)               This questionnaire asks about difficulties due to health  conditions. Health conditions             include disease or illnesses, other health problems that may be short or long lasting,             injuries, mental health or emotional problems, and problems with alcohol or drugs.                         Think back over the past 30 days and answer these questions, thinking about how much             difficulty you had doing the following activities. For each question, please Chin[place] only             one response.      S1  Standing for long periods such as 30 minutes?  Mild =           2    S2  Taking care of household responsibilities?  Moderate =   3    S3  Learning a new task, for example, learning how to get to a new place?  Mild =           2    S4  How much of a problem do you have joining community activities (for example, festivals, Sabianism or other activities) in the same way as anyone else can?  Moderate =   3    S5  How much have you been emotionally affected by your health problems?  Mild =           2        In the past 30 days, how much difficulty did you have in:    S6  Concentrating on doing something for ten minutes?  Mild =           2    S7  Walking a long distance such as a kilometer (or equivalent)?  Severe =       4    S8  Washing your whole body?  None =         1    S9  Getting dressed?  None =         1    S10  Dealing with people you do not know?  Moderate =   3    S11  Maintaining a friendship?  Severe =       4    S12  Your day to day work?  Moderate =   3       H1  Overall, in the past 30 days, how many days were these difficulties present?  Record number of days seven    H2  In the past 30 days, for how many days were you totally unable to carry out your usual activities or work because of any health condition?  Record number of days  seven    H3  In the past 30 days, not counting the days that you were totally unable, for how many days did you cut back or reduce your usual activities or work because of any health condition?  Record number of  days five                   Referral / Collaboration:  Referral to another professional/service is not indicated at this time..    Anticipated number of session or this episode of care: 15    Goals  1. Education- the Biopsychosocial model of depression  a. Client will be able to describe how depression is effecting them physically, emotionally and socially  2. Behavioral Activation  a. Client will learn to assess their depression on a day to day basis  b. Client will Identify two forms of exercise/activity and engage in them 3 times per week  c. Client will Identify 3 things that make them laugh, and engage in these 5 times per week.  d. Client will Identify 1-2Creative activities or hobbies  and engage in them 2 times per week  e. Client will identify music, movies, books that make them feel good and use them 3-4 times per week  3. Self-care  a. Client will identify 5 things they can do just for themselves  b. Client will take time for quiet, reflection, meditation 5 times per week  c. Client will Learn to set boundaries when appropriate  d. Client will Identify 2 individuals they can call on for support, distraction  4. Assessment of progress  a. Client will engage in assessment of their progress on a regular basis    Bipolar Disorder  Treatment plan:  1. Education- the Biopsychosocial model of Bipolar Disorder    a. Client will be able to describe in general terms what Bipolar Disorder  is and is not  b. Client will be able to describe how Bipolar Disorder  has affected their life in at least two different areas, such as school or work and Home/relationships  c. Clients parents/guardians or significant others will be provided information on what Bipolar Disorder  is and the ways it can affect relationships and be encouraged to be a part of clients treatment team.  2. Medication  a. Client will participate in medication evaluation for Bipolar Disorder  symptoms and follow medication recommendations.    3. Identification and management of triggers  a. Client and therapist will examine patient s history to determine if there are predictable triggers for manic or depressive episodes (e.g. boredom, anger, family stress)  b. Client and therapist will develop means of diffusing these triggers (e.g. relaxation strategies, boundary setting, anger management)  4. Comorbid conditions   a. Client and therapist will asses for comorbid conditions (e.g. anxiety, depression, substance use). and add additional items to treatment plan as needed  5. Self-care  a. Client will identify 3 things they can do just for themselves  b. Client will take time for quiet, reflection, meditation 3 times per week  c. Client will Learn to set boundaries when appropriate  d. Client will Identify 2 individuals they can call on for support, distraction  5. Behavioral Activation  a. Client will Identify two forms of exercise/activity and engage in them 3 times per week  b. Client will Identify 2 things that make them laugh, and engage in these 3 times per week.  c. Client will Identify 2 Creative activities or hobbies  and engage in them 2 times per week  d. Client will identify music, movies, books that make them feel good and use them 3 times per week  6. Assessment of progress  a. Client will engage in assessment of their progress on a regular basis    Client has reviewed and agreed to the above plan.      Kd Morris  4/3/2018

## 2018-04-21 ASSESSMENT — ANXIETY QUESTIONNAIRES: GAD7 TOTAL SCORE: 10

## 2018-04-21 ASSESSMENT — PATIENT HEALTH QUESTIONNAIRE - PHQ9: SUM OF ALL RESPONSES TO PHQ QUESTIONS 1-9: 11

## 2018-04-24 ENCOUNTER — OFFICE VISIT (OUTPATIENT)
Dept: OTOLARYNGOLOGY | Facility: CLINIC | Age: 53
End: 2018-04-24
Payer: COMMERCIAL

## 2018-04-24 VITALS
SYSTOLIC BLOOD PRESSURE: 130 MMHG | TEMPERATURE: 98.1 F | HEIGHT: 59 IN | DIASTOLIC BLOOD PRESSURE: 69 MMHG | WEIGHT: 223 LBS | BODY MASS INDEX: 44.96 KG/M2 | HEART RATE: 85 BPM

## 2018-04-24 DIAGNOSIS — H80.92 OTOSCLEROSIS, LEFT: ICD-10-CM

## 2018-04-24 DIAGNOSIS — H90.3 BILATERAL SENSORINEURAL HEARING LOSS: Primary | ICD-10-CM

## 2018-04-24 DIAGNOSIS — Z71.6 TOBACCO ABUSE COUNSELING: ICD-10-CM

## 2018-04-24 PROCEDURE — 99203 OFFICE O/P NEW LOW 30 MIN: CPT | Performed by: OTOLARYNGOLOGY

## 2018-04-24 NOTE — NURSING NOTE
"Initial /69 (BP Location: Right arm, Patient Position: Sitting, Cuff Size: Adult Regular)  Pulse 85  Temp 98.1  F (36.7  C) (Oral)  Ht 1.499 m (4' 11\")  Wt 101.2 kg (223 lb)  LMP 09/27/2009  BMI 45.04 kg/m2 Estimated body mass index is 45.04 kg/(m^2) as calculated from the following:    Height as of this encounter: 1.499 m (4' 11\").    Weight as of this encounter: 101.2 kg (223 lb). .    Joseline Garnett MA    "

## 2018-04-24 NOTE — MR AVS SNAPSHOT
After Visit Summary   4/24/2018    Bia Bill    MRN: [id number]           Patient Information     Date Of Birth          1965        Visit Information        Provider Department      4/24/2018 1:45 PM Anila Hardy MD [facility]        Today's Diagnoses     Bilateral sensorineural hearing loss    -  1    Otosclerosis, left        Tobacco abuse counseling          Care Instructions    Per Physician's instructions          Follow-ups after your visit        Your next 10 appointments already scheduled     Apr 26, 2018  9:30 AM CDT   Lymphedema Evaluation with Joseline Santana PT   [facility] ([facility])    [address] [address]   [phone]            May 02, 2018  1:00 PM CDT   Office Visit with Kd Leong MD   [facility] ([facility]    [address] [address] [phone]           Bring a current list of meds and any records pertaining to this visit. For Physicals, please bring immunization records and any forms needing to be filled out. Please arrive 10 minutes early to complete paperwork.            May 03, 2018  9:30 AM CDT   Return Visit with Kd Morris   [facility] ([facility])    [address] [address]   [phone]            May 03, 2018 11:00 AM CDT   New Visit with Rajinder Bose MD   [facility] ([facility])    [address] [address]   [phone]            May 03, 2018  1:00 PM CDT   Hearing Aid Consult with Hannah Middleton   [facility] ([facility])    [address] [address]   [phone]            May 21, 2018  9:30 AM CDT   Return Visit with Kd Morris   [facility] ([facility])    [address] [address]    [phone]            Jun 22, 2018  1:00 PM CDT   LAB with [facility] ([facility])    [address] [address]   [phone]           Please do not eat 10-12 hours before your appointment if you are coming in fasting for labs on lipids, cholesterol, or glucose (sugar). This does not apply to pregnant women. Water, hot tea and black coffee (with nothing added) are okay. Do not drink other fluids, diet soda or chew gum.            Sep 21, 2018  1:00 PM CDT   LAB with [facility]   [facility] ([facility])    [address] [address]   [phone]           Please do not eat 10-12 hours before your appointment if you are coming in fasting for labs on lipids, cholesterol, or glucose (sugar). This does not apply to pregnant women. Water, hot tea and black coffee (with nothing added) are okay. Do not drink other fluids, diet soda or chew gum.            Sep 28, 2018  1:30 PM CDT   Return Visit with Joshua Watts MD   [facility] ([facility])    [facility]  [address]  [facility] [address]   [phone]              Who to contact     If you have questions or need follow up information about today's clinic visit or your schedule please contact [facility] directly at [phone].  Normal or non-critical lab and imaging results will be communicated to you by MyChart, letter or phone within 4 business days after the clinic has received the results. If you do not hear from us within 7 days, please contact the clinic through [employer] or phone. If you have a critical or abnormal lab result, we will notify you by phone as soon as possible.  Submit refill requests through [employer] or call your pharmacy and they will forward the refill request to us. Please allow 3 business days for your refill to be completed.          Additional Information  "About Your Visit        MyChart Information     [employer] lets you send messages to your doctor, view your test results, renew your prescriptions, schedule appointments and more. To sign up, go to [email or web address] . Click on \"Log in\" on the left side of the screen, which will take you to the Welcome page. Then click on \"Sign up Now\" on the right side of the page.     You will be asked to enter the access code listed below, as well as some personal information. Please follow the directions to create your username and password.     Your access code is: [email or web address]  Expires: 2018  1:16 PM     Your access code will  in 90 days. If you need help or a new code, please call your [facility] clinic or [phone].        Care EveryWhere ID     This is your Care EveryWhere ID. This could be used by other organizations to access your [facility] medical records  [id number]        Your Vitals Were     Pulse Temperature Height Last Period BMI (Body Mass Index)       85 98.1  F (36.7  C) (Oral) 1.499 m (4' 11\") 2009 45.04 kg/m2        Blood Pressure from Last 3 Encounters:   18 130/69   18 132/73   18 118/55    Weight from Last 3 Encounters:   18 101.2 kg (223 lb)   18 101.6 kg (223 lb 14.4 oz)   18 102 kg (224 lb 14.4 oz)              Today, you had the following     No orders found for display       Primary Care Provider Office Phone # Fax #    Kd Leong [phone]       [facility] [address] [address] [address] [facility] [address]        Equal Access to Services     ANTIONETTE IRBY AH: Elfego Clark, waldemar rust, emma clay, nitin craig. [facility] [phone].    ATENCIÓN: Si habla español, tiene a terry disposición servicios gratuitos de asistencia lingüística. Natalie al [phone].    We comply with applicable federal civil rights laws and [place] laws. We do not discriminate on the basis " of race, color, national origin, age, disability, sex, sexual orientation, or gender identity.            Thank you!     Thank you for choosing [facility]  for your care. Our goal is always to provide you with excellent care. Hearing back from our patients is one way we can continue to improve our services. Please take a few minutes to complete the written survey that you may receive in the mail after your visit with us. Thank you!             Your Updated Medication List - Protect others around you: Learn how to safely use, store and throw away your medicines at www.disposemymeds.org.          This list is accurate as of 4/24/18  4:52 PM.  Always use your most recent med list.                   Brand Name Dispense Instructions for use Diagnosis    acetaminophen 500 MG tablet    TYLENOL     Take 500-1,000 mg by mouth every 6 hours as needed for mild pain        * albuterol 108 (90 Base) MCG/ACT Inhaler    PROAIR HFA/PROVENTIL HFA/VENTOLIN HFA    3 Inhaler    Inhale 2 puffs into the lungs every 6 hours as needed for shortness of breath / dyspnea or wheezing    Mild persistent asthma without complication       * albuterol (2.5 MG/3ML) 0.083% neb solution     1 Box    Take 1 vial (2.5 mg) by nebulization every 6 hours as needed for shortness of breath / dyspnea or wheezing    Mild persistent asthma without complication       allopurinol 100 MG tablet    ZYLOPRIM    90 tablet    TAKE ONE TABLET BY MOUTH ONE TIME DAILY    Acute gouty arthritis       ARIPiprazole 5 MG tablet    ABILIFY    180 tablet    Take 1.5 tablets (7.5 mg) by mouth At Bedtime    Major depressive disorder, recurrent episode, moderate (H)       buPROPion 150 MG 24 hr tablet    WELLBUTRIN XL    90 tablet    TAKE  ONE TABLET BY MOUTH EVERY DAY IN THE MORNING    COPD exacerbation (H)       calcium citrate-vitamin D 315-250 MG-UNIT Tabs per tablet    CITRACAL     Take 2 tablets by mouth daily        EPINEPHrine 0.3 MG/0.3ML injection 2-pack     EPIPEN/ADRENACLICK/or ANY BX GENERIC EQUIV    0.6 mL    Inject 0.3 mLs (0.3 mg) into the muscle once as needed for anaphylaxis    Anaphylactic reaction to bee sting, undetermined intent, subsequent encounter       fluticasone 50 MCG/ACT spray    FLONASE    1 Bottle    Spray 1-2 sprays into both nostrils daily    Nasal congestion       furosemide 20 MG tablet    LASIX    180 tablet    TAKE ONE TABLET BY MOUTH TWICE DAILY    Lymphedema of both lower extremities       gabapentin 600 MG tablet    NEURONTIN    90 tablet    TAKE ONE TABLET BY MOUTH THREE TIMES DAILY    Polyneuropathy in other diseases classified elsewhere (H)       ipratropium - albuterol 0.5 mg/2.5 mg/3 mL 0.5-2.5 (3) MG/3ML neb solution    DUONEB    30 vial    Take 1 vial (3 mLs) by nebulization every 6 hours as needed for shortness of breath / dyspnea or wheezing    Chronic obstructive pulmonary disease, unspecified COPD type (H)       lamoTRIgine 200 MG tablet    LaMICtal    90 tablet    Take 1 tablet (200 mg) by mouth daily    Major depressive disorder, recurrent episode, moderate (H)       levofloxacin 750 MG tablet    LEVAQUIN    10 tablet    Take 1 tablet (750 mg) by mouth daily    COPD exacerbation (H), Chronic obstructive pulmonary disease, unspecified COPD type (H)       levothyroxine 150 MCG tablet    SYNTHROID/LEVOTHROID    90 tablet    TAKE ONE TABLET BY MOUTH DAILY    Hypothyroidism, unspecified type       metoclopramide 10 MG tablet    REGLAN    40 tablet    Take 1 tablet (10 mg) by mouth 4 times daily as needed    Flatulence, eructation, and gas pain       mometasone-formoterol 200-5 MCG/ACT oral inhaler    DULERA    13 g    Inhale 2 puffs into the lungs 2 times daily    COPD exacerbation (H), Chronic obstructive pulmonary disease, unspecified COPD type (H)       omeprazole 20 MG CR capsule    priLOSEC    180 capsule    TAKE 1 CAPSULE (20 MG) BY MOUTH 2 TIMES DAILY    Gastroesophageal reflux disease without esophagitis        "ondansetron 4 MG tablet    ZOFRAN    10 tablet    Take 1 tablet (4 mg) by mouth every 8 hours as needed for nausea    Nausea and vomiting, intractability of vomiting not specified, unspecified vomiting type       * order for DME      Equipment being ordered: CPAP AIRSENSE 10 5-18 CM H20 # [id number]   DN# [id number]        * order for DME     1 Month    Equipment being ordered: INCONTINENCE PADS  QID    Other urinary incontinence       * order for DME     60 Month    Equipment being ordered: DEPENDS SIZE LARGE    Mixed incontinence       * order for DME     2 Units    Equipment being ordered: [id number]\" x2pair   \"20-30mmHg [place] Hybrid Liners\" x 2 pair    Peripheral edema       order for DME     1 Device    Equipment being ordered: Nebulizer    COPD exacerbation (H), Chronic obstructive pulmonary disease, unspecified COPD type (H)       predniSONE 20 MG tablet    DELTASONE    24 tablet    Take 60mg/day for 4 days, take 40mg/day for 4 days, take 20mg/day for 4 days, then stop    COPD exacerbation (H), Chronic obstructive pulmonary disease, unspecified COPD type (H)       simvastatin 20 MG tablet    ZOCOR    30 tablet    TAKE ONE TABLET BY MOUTH NIGHTLY AT BEDTIME    Hypercholesteremia       traZODone 150 MG tablet    DESYREL    30 tablet    TAKE ONE TABLET BY MOUTH NIGHTLY AT BEDTIME    Major depressive disorder, recurrent episode, moderate (H)       warfarin 5 MG tablet    COUMADIN    180 tablet    Take 12.5 mg on Wed and Sat 10 mg all other days or as directed by the Anticoagulation Clinic    DVT (deep venous thrombosis) (H)       * Notice:  This list has 6 medication(s) that are the same as other medications prescribed for you. Read the directions carefully, and ask your doctor or other care provider to review them with you.      "

## 2018-04-24 NOTE — LETTER
4/24/2018         RE: Bia Bill  [id number]  [facility] [id number]        Dear Colleague,    Thank you for referring your patient, Bia Bill, to the [facility]. Please see a copy of my visit note below.        History of Present Illness - Bia Bill is a 52 year old female who presents with concerns that her hearing aids are weak now. She has been wearing the current set for at least 5 years. She has known left hearing loss since early adulthood. She had workup about 12 years ago for asymmetric SNHL which was negative by Dr. Headley.    Past Medical History -   Patient Active Problem List   Diagnosis     Mild persistent asthma     Polyneuropathy in other diseases classified elsewhere (H)     DVT (deep venous thrombosis) (H)     Alcohol abuse, in remission     SECONDARY POLYCYTHEMIA     Chondromalacia of patella     Sensorineural hearing loss, asymmetrical     Developmental reading disorder     Esophageal reflux     Hypothyroidism     Fatty liver     GILES (Obstructive Sleep Apnea)-Moderate (AHI 16)     RLS (restless legs syndrome)     Smoker     Erythrocytosis     Moderate mixed bipolar I disorder (H)     Personality disorder, depressive     COPD (chronic obstructive pulmonary disease) (H)     Current use of long term anticoagulation     [place]     [facility]     DDD (degenerative disc disease), cervical     Benign neoplasm of colon (POLYPOSIS)     Stroke (H)     Somatization disorder     Sebaceous cyst of right axilla     HTN, goal below 140/90     Left leg pain     Vitamin D deficiency     Long term current use of anticoagulant therapy     Morbid obesity (H)     PVD (peripheral vascular disease) with claudication (H)     Cyst of left ovary     Morbid obesity with alveolar hypoventilation (H)     GI bleed     Severe episode of recurrent major depressive disorder (H)     Conductive hearing loss of left ear with restricted hearing of right ear      Sensorineural hearing loss (SNHL) of right ear with restricted hearing of left ear       Current Medications -   Current Outpatient Prescriptions:      acetaminophen (TYLENOL) 500 MG tablet, Take 500-1,000 mg by mouth every 6 hours as needed for mild pain, Disp: , Rfl:      albuterol (2.5 MG/3ML) 0.083% neb solution, Take 1 vial (2.5 mg) by nebulization every 6 hours as needed for shortness of breath / dyspnea or wheezing, Disp: 1 Box, Rfl: 0     albuterol (PROAIR HFA/PROVENTIL HFA/VENTOLIN HFA) 108 (90 BASE) MCG/ACT Inhaler, Inhale 2 puffs into the lungs every 6 hours as needed for shortness of breath / dyspnea or wheezing, Disp: 3 Inhaler, Rfl: 1     allopurinol (ZYLOPRIM) 100 MG tablet, TAKE ONE TABLET BY MOUTH ONE TIME DAILY, Disp: 90 tablet, Rfl: 2     ARIPiprazole (ABILIFY) 5 MG tablet, Take 1.5 tablets (7.5 mg) by mouth At Bedtime, Disp: 180 tablet, Rfl: 3     buPROPion (WELLBUTRIN XL) 150 MG 24 hr tablet, TAKE  ONE TABLET BY MOUTH EVERY DAY IN THE MORNING, Disp: 90 tablet, Rfl: 0     calcium citrate-vitamin D (CITRACAL) 315-250 MG-UNIT TABS per tablet, Take 2 tablets by mouth daily, Disp: , Rfl:      EPINEPHrine 0.3 MG/0.3ML injection, Inject 0.3 mLs (0.3 mg) into the muscle once as needed for anaphylaxis (Patient not taking: Reported on 3/23/2018), Disp: 0.6 mL, Rfl: 0     fluticasone (FLONASE) 50 MCG/ACT spray, Spray 1-2 sprays into both nostrils daily, Disp: 1 Bottle, Rfl: 11     furosemide (LASIX) 20 MG tablet, TAKE ONE TABLET BY MOUTH TWICE DAILY, Disp: 180 tablet, Rfl: 0     gabapentin (NEURONTIN) 600 MG tablet, TAKE ONE TABLET BY MOUTH THREE TIMES DAILY, Disp: 90 tablet, Rfl: 0     ipratropium - albuterol 0.5 mg/2.5 mg/3 mL (DUONEB) 0.5-2.5 (3) MG/3ML neb solution, Take 1 vial (3 mLs) by nebulization every 6 hours as needed for shortness of breath / dyspnea or wheezing, Disp: 30 vial, Rfl: 1     lamoTRIgine (LAMICTAL) 200 MG tablet, Take 1 tablet (200 mg) by mouth daily, Disp: 90 tablet, Rfl: 3      "levofloxacin (LEVAQUIN) 750 MG tablet, Take 1 tablet (750 mg) by mouth daily, Disp: 10 tablet, Rfl: 0     levothyroxine (SYNTHROID/LEVOTHROID) 150 MCG tablet, TAKE ONE TABLET BY MOUTH DAILY, Disp: 90 tablet, Rfl: 2     metoclopramide (REGLAN) 10 MG tablet, Take 1 tablet (10 mg) by mouth 4 times daily as needed, Disp: 40 tablet, Rfl: 0     mometasone-formoterol (DULERA) 200-5 MCG/ACT oral inhaler, Inhale 2 puffs into the lungs 2 times daily, Disp: 13 g, Rfl: 1     omeprazole (PRILOSEC) 20 MG CR capsule, TAKE 1 CAPSULE (20 MG) BY MOUTH 2 TIMES DAILY, Disp: 180 capsule, Rfl: 1     ondansetron (ZOFRAN) 4 MG tablet, Take 1 tablet (4 mg) by mouth every 8 hours as needed for nausea, Disp: 10 tablet, Rfl: 0     order for DME, Equipment being ordered: INCONTINENCE PADS  QID, Disp: 1 Month, Rfl: 11     order for DME, Equipment being ordered: DEPENDS SIZE LARGE, Disp: 60 Month, Rfl: 5     order for DME, Equipment being ordered: [id number]\" x2pair   \"20-30mmHg Farrow Hybrid Liners\" x 2 pair, Disp: 2 Units, Rfl: 11     order for DME, Equipment being ordered: CPAP AIRSENSE 10 5-18 CM H20 # [id number]   DN# [id number], Disp: , Rfl:      order for DME, Equipment being ordered: Nebulizer, Disp: 1 Device, Rfl: 0     predniSONE (DELTASONE) 20 MG tablet, Take 60mg/day for 4 days, take 40mg/day for 4 days, take 20mg/day for 4 days, then stop, Disp: 24 tablet, Rfl: 0     simvastatin (ZOCOR) 20 MG tablet, TAKE ONE TABLET BY MOUTH NIGHTLY AT BEDTIME, Disp: 30 tablet, Rfl: 0     traZODone (DESYREL) 150 MG tablet, TAKE ONE TABLET BY MOUTH NIGHTLY AT BEDTIME, Disp: 30 tablet, Rfl: 0     warfarin (COUMADIN) 5 MG tablet, Take 12.5 mg on Wed and Sat 10 mg all other days or as directed by the Anticoagulation Clinic, Disp: 180 tablet, Rfl: 2    Allergies -   Allergies   Allergen Reactions     Darvocet [Propoxyphene N-Apap] Anaphylaxis     Darvocet,Percocet      Percocet [Oxycodone-Acetaminophen] Anaphylaxis, Hives and Swelling     Has tolerated " hydromorphone in the past.      Asa [Aspirin] Hives     Aspirin causes seizures and hives, throat swelling.   Has tolerated ketorolac in the past.        Bee      Ibuprofen Swelling     Throat swelling per patient      Lyrica [Pregabalin] Other (See Comments) and Swelling     dizziness     Povidone Iodine Rash     Reaction to topical betadine     Tape [Adhesive Tape] Rash       Social History -   Social History     Social History     Marital status:      Spouse name: N/A     Number of children: N/A     Years of education: N/A     Social History Main Topics     Smoking status: Current Every Day Smoker     Packs/day: 0.50     Years: 34.00     Types: Cigarettes     Smokeless tobacco: Never Used      Comment: started at age 11.  Quit during pregnancyX4.  Quit 3 months ago.      Alcohol use No      Comment: quit 1995     Drug use: No      Comment: past hx 22 years ago/ uppers downers, canibus     Sexual activity: Not Currently     Birth control/ protection: Surgical     Other Topics Concern      Service No     Blood Transfusions No     Caffeine Concern Yes     5 cans, 3 cups a day     Occupational Exposure No     Hobby Hazards No     Sleep Concern No     Stress Concern No     Weight Concern Yes     Special Diet No     Back Care Yes     ciropracterDr. Shine 1 time a month     Exercise Yes     Bike Helmet No     Seat Belt No     Self-Exams No     Parent/Sibling W/ Cabg, Mi Or Angioplasty Before 65f 55m? No     Social History Narrative       Family History -   Family History   Problem Relation Age of Onset     Hypertension Mother      DIABETES Mother      Blood Disease Mother      HEART DISEASE Mother      chf     CEREBROVASCULAR DISEASE Mother      Hypertension Father      CANCER Father      lung cancer     Allergies Sister      Depression Sister      Hypertension Sister        Review of Systems - As per HPI and PMHx, otherwise 7 system review of the head and neck negative. 10+ system review  "negative.    Physical Exam  /69 (BP Location: Right arm, Patient Position: Sitting, Cuff Size: Adult Regular)  Pulse 85  Temp 98.1  F (36.7  C) (Oral)  Ht 1.499 m (4' 11\")  Wt 101.2 kg (223 lb)  LMP 09/27/2009  BMI 45.04 kg/m2  General - The patient is well nourished and well developed, and appears to have good nutritional status.  Alert and oriented to person and place, answers questions and cooperates with examination appropriately.   Head and Face - Normocephalic and atraumatic, with no gross asymmetry noted of the contour of the facial features.  The facial nerve is intact, with strong symmetric movements.  Voice and Breathing - The patient was breathing comfortably without the use of accessory muscles. There was no wheezing, stridor, or stertor.  The patients voice was clear and strong, and had appropriate pitch and quality.  Ears - Bilateral pinna and EACs with normal appearing overlying skin. Tympanic membrane intact with good mobility on pneumatic otoscopy bilaterally. Bony landmarks of the ossicular chain are normal. The tympanic membranes are normal in appearance. No retraction, perforation, or masses.  No fluid or purulence was seen in the external canal or the middle ear.   Eyes - Extraocular movements intact.  Sclera were not icteric or injected, conjunctiva were pink and moist.  Mouth - Examination of the oral cavity showed pink, healthy oral mucosa. No lesions or ulcerations noted.  The tongue was mobile and midline, and the dentition were in good condition.    Throat - The walls of the oropharynx were smooth, pink, moist, symmetric, and had no lesions or ulcerations.  The tonsillar pillars and soft palate were symmetric.  The uvula was midline on elevation.  Neck - Normal midline excursion of the laryngotracheal complex during swallowing.  Full range of motion on passive movement.  Palpation of the occipital, submental, submandibular, internal jugular chain, and supraclavicular nodes did " not demonstrate any abnormal lymph nodes or masses.  The carotid pulse was palpable bilaterally.  Palpation of the thyroid was soft and smooth, with no nodules or goiter appreciated.  The trachea was mobile and midline.  Nose - External contour is symmetric, no gross deflection or scars.  Nasal mucosa is pink and moist with no abnormal mucus.  The septum was midline and non-obstructive, turbinates of normal size and position.  No polyps, masses, or purulence noted on examination.      Audiogram 04/17/18 demonstrates 30db conductive hearing loss on the left with Carhart notch. Right mild SNHL.        Assessment - Bia Bill is a 52 year old female with likely longstanding left otosclerosis. However, she has been using bilateral hearing aids and prefers to avoid surgery. She is medically cleared to continue with amplification. She should return if there is sudden change in hearing.       Dr. Anila Hardy MD  Otolaryngology  [facility]        Again, thank you for allowing me to participate in the care of your patient.        Sincerely,        Anila Hardy MD

## 2018-04-24 NOTE — PROGRESS NOTES
History of Present Illness - Bia Bill is a 52 year old female who presents with concerns that her hearing aids are weak now. She has been wearing the current set for at least 5 years. She has known left hearing loss since early adulthood. She had workup about 12 years ago for asymmetric SNHL which was negative by Dr. Headley.    Past Medical History -   Patient Active Problem List   Diagnosis     Mild persistent asthma     Polyneuropathy in other diseases classified elsewhere (H)     DVT (deep venous thrombosis) (H)     Alcohol abuse, in remission     SECONDARY POLYCYTHEMIA     Chondromalacia of patella     Sensorineural hearing loss, asymmetrical     Developmental reading disorder     Esophageal reflux     Hypothyroidism     Fatty liver     GILES (Obstructive Sleep Apnea)-Moderate (AHI 16)     RLS (restless legs syndrome)     Smoker     Erythrocytosis     Moderate mixed bipolar I disorder (H)     Personality disorder, depressive     COPD (chronic obstructive pulmonary disease) (H)     Current use of long term anticoagulation     Rosacea     [facility]     DDD (degenerative disc disease), cervical     Benign neoplasm of colon (POLYPOSIS)     Stroke (H)     Somatization disorder     Sebaceous cyst of right axilla     HTN, goal below 140/90     Left leg pain     Vitamin D deficiency     Long term current use of anticoagulant therapy     Morbid obesity (H)     PVD (peripheral vascular disease) with claudication (H)     Cyst of left ovary     Morbid obesity with alveolar hypoventilation (H)     GI bleed     Severe episode of recurrent major depressive disorder (H)     Conductive hearing loss of left ear with restricted hearing of right ear     Sensorineural hearing loss (SNHL) of right ear with restricted hearing of left ear       Current Medications -   Current Outpatient Prescriptions:      acetaminophen (TYLENOL) 500 MG tablet, Take 500-1,000 mg by mouth every 6 hours as needed for mild pain, Disp:  , Rfl:      albuterol (2.5 MG/3ML) 0.083% neb solution, Take 1 vial (2.5 mg) by nebulization every 6 hours as needed for shortness of breath / dyspnea or wheezing, Disp: 1 Box, Rfl: 0     albuterol (PROAIR HFA/PROVENTIL HFA/VENTOLIN HFA) 108 (90 BASE) MCG/ACT Inhaler, Inhale 2 puffs into the lungs every 6 hours as needed for shortness of breath / dyspnea or wheezing, Disp: 3 Inhaler, Rfl: 1     allopurinol (ZYLOPRIM) 100 MG tablet, TAKE ONE TABLET BY MOUTH ONE TIME DAILY, Disp: 90 tablet, Rfl: 2     ARIPiprazole (ABILIFY) 5 MG tablet, Take 1.5 tablets (7.5 mg) by mouth At Bedtime, Disp: 180 tablet, Rfl: 3     buPROPion (WELLBUTRIN XL) 150 MG 24 hr tablet, TAKE  ONE TABLET BY MOUTH EVERY DAY IN THE MORNING, Disp: 90 tablet, Rfl: 0     calcium citrate-vitamin D (CITRACAL) 315-250 MG-UNIT TABS per tablet, Take 2 tablets by mouth daily, Disp: , Rfl:      EPINEPHrine 0.3 MG/0.3ML injection, Inject 0.3 mLs (0.3 mg) into the muscle once as needed for anaphylaxis (Patient not taking: Reported on 3/23/2018), Disp: 0.6 mL, Rfl: 0     fluticasone (FLONASE) 50 MCG/ACT spray, Spray 1-2 sprays into both nostrils daily, Disp: 1 Bottle, Rfl: 11     furosemide (LASIX) 20 MG tablet, TAKE ONE TABLET BY MOUTH TWICE DAILY, Disp: 180 tablet, Rfl: 0     gabapentin (NEURONTIN) 600 MG tablet, TAKE ONE TABLET BY MOUTH THREE TIMES DAILY, Disp: 90 tablet, Rfl: 0     ipratropium - albuterol 0.5 mg/2.5 mg/3 mL (DUONEB) 0.5-2.5 (3) MG/3ML neb solution, Take 1 vial (3 mLs) by nebulization every 6 hours as needed for shortness of breath / dyspnea or wheezing, Disp: 30 vial, Rfl: 1     lamoTRIgine (LAMICTAL) 200 MG tablet, Take 1 tablet (200 mg) by mouth daily, Disp: 90 tablet, Rfl: 3     levofloxacin (LEVAQUIN) 750 MG tablet, Take 1 tablet (750 mg) by mouth daily, Disp: 10 tablet, Rfl: 0     levothyroxine (SYNTHROID/LEVOTHROID) 150 MCG tablet, TAKE ONE TABLET BY MOUTH DAILY, Disp: 90 tablet, Rfl: 2     metoclopramide (REGLAN) 10 MG tablet, Take 1  "tablet (10 mg) by mouth 4 times daily as needed, Disp: 40 tablet, Rfl: 0     mometasone-formoterol (DULERA) 200-5 MCG/ACT oral inhaler, Inhale 2 puffs into the lungs 2 times daily, Disp: 13 g, Rfl: 1     omeprazole (PRILOSEC) 20 MG CR capsule, TAKE 1 CAPSULE (20 MG) BY MOUTH 2 TIMES DAILY, Disp: 180 capsule, Rfl: 1     ondansetron (ZOFRAN) 4 MG tablet, Take 1 tablet (4 mg) by mouth every 8 hours as needed for nausea, Disp: 10 tablet, Rfl: 0     order for DME, Equipment being ordered: INCONTINENCE PADS  QID, Disp: 1 Month, Rfl: 11     order for DME, Equipment being ordered: DEPENDS SIZE LARGE, Disp: 60 Month, Rfl: 5     order for DME, Equipment being ordered: [id number]\" x2pair   \"20-30mmHg Farrow Hybrid Liners\" x 2 pair, Disp: 2 Units, Rfl: 11     order for DME, Equipment being ordered: CPAP AIRSENSE 10 5-18 CM H20 # [id number]   DN# [id number], Disp: , Rfl:      order for DME, Equipment being ordered: Nebulizer, Disp: 1 Device, Rfl: 0     predniSONE (DELTASONE) 20 MG tablet, Take 60mg/day for 4 days, take 40mg/day for 4 days, take 20mg/day for 4 days, then stop, Disp: 24 tablet, Rfl: 0     simvastatin (ZOCOR) 20 MG tablet, TAKE ONE TABLET BY MOUTH NIGHTLY AT BEDTIME, Disp: 30 tablet, Rfl: 0     traZODone (DESYREL) 150 MG tablet, TAKE ONE TABLET BY MOUTH NIGHTLY AT BEDTIME, Disp: 30 tablet, Rfl: 0     warfarin (COUMADIN) 5 MG tablet, Take 12.5 mg on Wed and Sat 10 mg all other days or as directed by the Anticoagulation Clinic, Disp: 180 tablet, Rfl: 2    Allergies -   Allergies   Allergen Reactions     Darvocet [Propoxyphene N-Apap] Anaphylaxis     Darvocet,Percocet      Percocet [Oxycodone-Acetaminophen] Anaphylaxis, Hives and Swelling     Has tolerated hydromorphone in the past.      Asa [Aspirin] Hives     Aspirin causes seizures and hives, throat swelling.   Has tolerated ketorolac in the past.        Bee      Ibuprofen Swelling     Throat swelling per patient      Lyrica [Pregabalin] Other (See Comments) and " "Swelling     dizziness     Povidone Iodine Rash     Reaction to topical betadine     Tape [Adhesive Tape] Rash       Social History -   Social History     Social History     Marital status:      Spouse name: N/A     Number of children: N/A     Years of education: N/A     Social History Main Topics     Smoking status: Current Every Day Smoker     Packs/day: 0.50     Years: 34.00     Types: Cigarettes     Smokeless tobacco: Never Used      Comment: started at age 11.  Quit during pregnancyX4.  Quit 3 months ago.      Alcohol use No      Comment: quit 1995     Drug use: No      Comment: past hx 22 years ago/ uppers downers, canibus     Sexual activity: Not Currently     Birth control/ protection: Surgical     Other Topics Concern      Service No     Blood Transfusions No     Caffeine Concern Yes     5 cans, 3 cups a day     Occupational Exposure No     Hobby Hazards No     Sleep Concern No     Stress Concern No     Weight Concern Yes     Special Diet No     Back Care Yes     ciropracter, Dr. Shine 1 time a month     Exercise Yes     Bike Helmet No     Seat Belt No     Self-Exams No     Parent/Sibling W/ Cabg, Mi Or Angioplasty Before 65f 55m? No     Social History Narrative       Family History -   Family History   Problem Relation Age of Onset     Hypertension Mother      DIABETES Mother      Blood Disease Mother      HEART DISEASE Mother      chf     CEREBROVASCULAR DISEASE Mother      Hypertension Father      CANCER Father      lung cancer     Allergies Sister      Depression Sister      Hypertension Sister        Review of Systems - As per HPI and PMHx, otherwise 7 system review of the head and neck negative. 10+ system review negative.    Physical Exam  /69 (BP Location: Right arm, Patient Position: Sitting, Cuff Size: Adult Regular)  Pulse 85  Temp 98.1  F (36.7  C) (Oral)  Ht 1.499 m (4' 11\")  Wt 101.2 kg (223 lb)  LMP 09/27/2009  BMI 45.04 kg/m2  General - The patient is well " nourished and well developed, and appears to have good nutritional status.  Alert and oriented to person and place, answers questions and cooperates with examination appropriately.   Head and Face - Normocephalic and atraumatic, with no gross asymmetry noted of the contour of the facial features.  The facial nerve is intact, with strong symmetric movements.  Voice and Breathing - The patient was breathing comfortably without the use of accessory muscles. There was no wheezing, stridor, or stertor.  The patients voice was clear and strong, and had appropriate pitch and quality.  Ears - Bilateral pinna and EACs with normal appearing overlying skin. Tympanic membrane intact with good mobility on pneumatic otoscopy bilaterally. Bony landmarks of the ossicular chain are normal. The tympanic membranes are normal in appearance. No retraction, perforation, or masses.  No fluid or purulence was seen in the external canal or the middle ear.   Eyes - Extraocular movements intact.  Sclera were not icteric or injected, conjunctiva were pink and moist.  Mouth - Examination of the oral cavity showed pink, healthy oral mucosa. No lesions or ulcerations noted.  The tongue was mobile and midline, and the dentition were in good condition.    Throat - The walls of the oropharynx were smooth, pink, moist, symmetric, and had no lesions or ulcerations.  The tonsillar pillars and soft palate were symmetric.  The uvula was midline on elevation.  Neck - Normal midline excursion of the laryngotracheal complex during swallowing.  Full range of motion on passive movement.  Palpation of the occipital, submental, submandibular, internal jugular chain, and supraclavicular nodes did not demonstrate any abnormal lymph nodes or masses.  The carotid pulse was palpable bilaterally.  Palpation of the thyroid was soft and smooth, with no nodules or goiter appreciated.  The trachea was mobile and midline.  Nose - External contour is symmetric, no gross  deflection or scars.  Nasal mucosa is pink and moist with no abnormal mucus.  The septum was midline and non-obstructive, turbinates of normal size and position.  No polyps, masses, or purulence noted on examination.      Audiogram 04/17/18 demonstrates 30db conductive hearing loss on the left with Carhart notch. Right mild SNHL.        Assessment - Bia Bill is a 52 year old female with likely longstanding left otosclerosis. However, she has been using bilateral hearing aids and prefers to avoid surgery. She is medically cleared to continue with amplification. She should return if there is sudden change in hearing.       Dr. Anila Hardy MD  Otolaryngology  [facility]

## 2018-04-26 ENCOUNTER — HOSPITAL ENCOUNTER (OUTPATIENT)
Dept: PHYSICAL THERAPY | Facility: CLINIC | Age: 53
Setting detail: THERAPIES SERIES
End: 2018-04-26
Attending: FAMILY MEDICINE
Payer: COMMERCIAL

## 2018-04-26 PROCEDURE — 97161 PT EVAL LOW COMPLEX 20 MIN: CPT | Mod: GP | Performed by: PHYSICAL THERAPIST

## 2018-04-26 PROCEDURE — 40000099 ZZH STATISTIC LYMPHEDEMA VISIT: Performed by: PHYSICAL THERAPIST

## 2018-04-26 PROCEDURE — 97140 MANUAL THERAPY 1/> REGIONS: CPT | Mod: GP | Performed by: PHYSICAL THERAPIST

## 2018-04-26 NOTE — PROGRESS NOTES
Outpatient Lymphedema Therapy Evaluation     04/26/18 0900   Rehab Discipline   Discipline PT   Type of Visit   Type of visit Initial Edema Evaluation       present No   General Information   Start of care 04/26/18   Referring physician Dr. Dang MD   Orders Evaluate and treat as indicated   Order date 04/19/18   Medical diagnosis BLE secondary lymphedema   Edema onset (4/19/18 - date of referral)   Affected body parts RLE;LLE   Edema etiology comments chronic LE edema, history of DVT when pregnant (1992) and again in 2004 which required hospitalization and intervention at [facility]   Pertinent history of current problem (PT: include personal factors and/or comorbidities that impact the POC; OT: include additional occupational profile info) last seen at [facility] lymphedema clinic from 1/19/16 - 2/16/16 and was discharged wearing [id number] with Hybrid Liners; reports last wore compression 6+ months ago due to being too small   Surgical / medical history reviewed Yes   Prior level of functional mobility mod-I using SEC; reports had a fall earlier this week, denies hitting head but hurt R-hip   Prior treatment Compression garments;Elevation;Diuretics;Gradient compression bandaging   Patient role / employment history Disabled   Living environment House / [facility]  (1-level home with 5 stairs to enter)   Living environment comments lives with roommate   Current assistive devices Standard cane   Fall Risk Screen   Fall screen completed by PT   Have you fallen 2 or more times in the past year? No   Have you fallen and had an injury in the past year? No   Timed Up and Go score (seconds) 13.2   Is patient a fall risk? Yes;Department fall risk interventions implemented   Fall screen comments reports had a fall earlier this week   System Outcome Measures   Outcome Measures Lymphedema   Lymphedema Life Impact Scale (score range 0-72). A higher score indicates greater impairment. 50   Subjective Report    Patient report of symptoms legs are very tight and paniful (L>R)   Precautions   Precautions comments no known precautions   Patient / Family Goals   Patient / family goals statement to reduce swelling in legs and lessen the pain   Pain   Patient currently in pain Yes   Pain location BLEs (L>R)   Pain rating 10/10   Vitals Signs   Vital Signs Comments 89bpm; 93% room air   Cognitive Status   Orientation Orientation to person, place and time   Level of consciousness Alert   Follows commands and answers questions 100% of the time   Personal safety and judgement Intact   Memory Intact   Edema Exam / Assessment   Skin condition Pitting   Skin condition comments BLEs with 3+ soft pitting present from dorsum of feet to knees; feet, toes and heels are dry, hemosiderian staining present in BLEs from ankles to mid/upper calf (L>R); R-great toe with recent dog bite that has dried blood still present and small open wound at lateral great toe nailbed; skin is very tight and shiny from ankles to knee (L>R)   Scar No   Capillary refill Symmetrical   Dorsal pedal pulse Decreased   Dorsal pedal pulse comments from edema   Stemmer sign Negative   Ulceration No   Girth Measurements   Girth Measurements Refer to separate girth measurement flowsheet   Volume LE   Right LE (mL) 3726.6   Left LE (mL) 4202.2   LE volume comparison LLE volume greater than RLE volume   % difference 12.8%   Range of Motion   ROM No deficits were identified   ROM comments functional LE ROM   Strength   Strength No deficits were identified   Strength comments functional LE strength via observation of mobility   Posture   Posture Forward head position   Palpation   Palpation sensitive with palpation   Activities of Daily Living   Activities of Daily Living mod-I using SEC   Sensory   Sensory perception Light touch   Light touch Intact   Vascular Assessment   Vascular Assessment Comments venous contributor to edema due to history of DVTs and CVI presentation  today   Coordination   Coordination Gross motor coordination appropriate   Muscle Tone   Muscle tone No deficits were identified   Planned Edema Interventions   Planned edema interventions Gradient compression bandaging;Fit for compression garment;Exercises;Precautions to prevent infection / exacerbation;Education;Manual therapy;Skin care / precautions;Home management program development   Clinical Impression   Criteria for skilled therapeutic intervention met Yes   Therapy diagnosis BLE secondary lympehdema, chronic, with venous component   Influenced by the following impairments / conditions Stage 2;Phlebolymphedema   Functional limitations due to impairments / conditions unable to wear shoes, currently only able to wear slippers which are also noted to be tight; limited ankle DF and tightness during talking due to significant edema    Clinical Presentation Stable/Uncomplicated   Clinical Presentation Rationale clinical judgement; no weeping or wounds; edema is chronic   Clinical Decision Making (Complexity) Low complexity   Treatment frequency (3x/week for 4 weeks and 2x/week for 8 weeks)   Treatment duration 12 weeks   Patient / family and/or staff in agreement with plan of care Yes   Risks and benefits of therapy have been explained Yes   Education Assessment   Preferred learning style Listening   Barriers to learning No barriers   Goals   Edema Eval Goals 1;2;3;4   Goal 1   Goal identifier stg   Goal description pt to have around the clock tolerance to BLE GCB for edema reduction response   Target date 05/10/18   Goal 2   Goal identifier ltg   Goal description once appropriate, pt and/or friend able to demo independence with donning, doffing and care of comperssion garments for longterm edema management for maintenance   Target date 07/25/18   Goal 3   Goal identifier ltg   Goal description pt to be independent with BLE edema management longterm via exercise, elevation, skin cares and compression garment  wear/use   Target date 07/25/18   Goal 4   Goal identifier ltg   Goal description pt to have at least 8 point improvement on LLIS due to decreased edema, pain and tightness in BLEs   Target date 07/25/18   Total Evaluation Time   Total evaluation time 10

## 2018-04-27 ENCOUNTER — HOSPITAL ENCOUNTER (OUTPATIENT)
Dept: PHYSICAL THERAPY | Facility: CLINIC | Age: 53
Setting detail: THERAPIES SERIES
End: 2018-04-27
Attending: FAMILY MEDICINE
Payer: COMMERCIAL

## 2018-04-27 PROCEDURE — 97140 MANUAL THERAPY 1/> REGIONS: CPT | Mod: GP | Performed by: REHABILITATION PRACTITIONER

## 2018-04-27 PROCEDURE — 40000099 ZZH STATISTIC LYMPHEDEMA VISIT: Performed by: REHABILITATION PRACTITIONER

## 2018-04-29 DIAGNOSIS — K21.9 GASTROESOPHAGEAL REFLUX DISEASE WITHOUT ESOPHAGITIS: ICD-10-CM

## 2018-04-30 ENCOUNTER — HOSPITAL ENCOUNTER (OUTPATIENT)
Dept: PHYSICAL THERAPY | Facility: CLINIC | Age: 53
Setting detail: THERAPIES SERIES
End: 2018-04-30
Attending: FAMILY MEDICINE
Payer: COMMERCIAL

## 2018-04-30 PROCEDURE — 40000099 ZZH STATISTIC LYMPHEDEMA VISIT: Performed by: REHABILITATION PRACTITIONER

## 2018-04-30 PROCEDURE — 97140 MANUAL THERAPY 1/> REGIONS: CPT | Mod: GP | Performed by: REHABILITATION PRACTITIONER

## 2018-04-30 NOTE — TELEPHONE ENCOUNTER
"OMEPRAZOLE 20 MG CAP APOT        Last Written Prescription Date:  10/17/17  Last Fill Quantity: 180,   # refills: 1  Last Office Visit: 3/30/18  Future Office visit:    Next 5 appointments (look out 90 days)     May 02, 2018  1:00 PM CDT   Office Visit with Kd Leong MD   [facility]    [address] [address]   [phone]            May 03, 2018  9:30 AM CDT   Return Visit with dK Morris   [facility] ([facility])    [address] [address]   [phone]            May 21, 2018  9:30 AM CDT   Return Visit with Kd Morris   [facility] ([facility])    [address] [address]   [phone]                   Requested Prescriptions   Pending Prescriptions Disp Refills     omeprazole (PRILOSEC) 20 MG CR capsule [Pharmacy Med Name: OMEPRAZOLE 20 MG    CAP APOT] 60 capsule 0     Sig: TAKE ONE CAPSULE BY MOUTH TWICE DAILY    PPI Protocol Passed    4/29/2018  9:55 AM       Passed - Not on Clopidogrel (unless Pantoprazole ordered)       Passed - No diagnosis of osteoporosis on record       Passed - Recent (12 mo) or future (30 days) visit within the authorizing provider's specialty    Patient had office visit in the last 12 months or has a visit in the next 30 days with authorizing provider or within the authorizing provider's specialty.  See \"Patient Info\" tab in inbasket, or \"Choose Columns\" in Meds & Orders section of the refill encounter.           Passed - Patient is age 18 or older       Passed - No active pregnacy on record       Passed - No positive pregnancy test in past 12 months          "

## 2018-05-02 ENCOUNTER — OFFICE VISIT (OUTPATIENT)
Dept: FAMILY MEDICINE | Facility: CLINIC | Age: 53
End: 2018-05-02
Payer: COMMERCIAL

## 2018-05-02 ENCOUNTER — HOSPITAL ENCOUNTER (OUTPATIENT)
Dept: PHYSICAL THERAPY | Facility: CLINIC | Age: 53
Setting detail: THERAPIES SERIES
End: 2018-05-02
Attending: FAMILY MEDICINE
Payer: COMMERCIAL

## 2018-05-02 VITALS
SYSTOLIC BLOOD PRESSURE: 130 MMHG | TEMPERATURE: 98.1 F | OXYGEN SATURATION: 92 % | DIASTOLIC BLOOD PRESSURE: 63 MMHG | HEIGHT: 59 IN | WEIGHT: 233.8 LBS | BODY MASS INDEX: 47.13 KG/M2 | HEART RATE: 84 BPM

## 2018-05-02 DIAGNOSIS — T78.2XXD ANAPHYLACTIC REACTION TO BEE STING, UNDETERMINED INTENT, SUBSEQUENT ENCOUNTER: ICD-10-CM

## 2018-05-02 DIAGNOSIS — I89.0 LYMPHEDEMA OF BOTH LOWER EXTREMITIES: Primary | ICD-10-CM

## 2018-05-02 DIAGNOSIS — T63.444D ANAPHYLACTIC REACTION TO BEE STING, UNDETERMINED INTENT, SUBSEQUENT ENCOUNTER: ICD-10-CM

## 2018-05-02 DIAGNOSIS — I89.0 SECONDARY LYMPHEDEMA: Primary | ICD-10-CM

## 2018-05-02 DIAGNOSIS — F33.1 MAJOR DEPRESSIVE DISORDER, RECURRENT EPISODE, MODERATE (H): ICD-10-CM

## 2018-05-02 LAB
ALBUMIN SERPL-MCNC: 3.3 G/DL (ref 3.4–5)
ALP SERPL-CCNC: 129 U/L (ref 40–150)
ALT SERPL W P-5'-P-CCNC: 33 U/L (ref 0–50)
ANION GAP SERPL CALCULATED.3IONS-SCNC: 4 MMOL/L (ref 3–14)
AST SERPL W P-5'-P-CCNC: 22 U/L (ref 0–45)
BILIRUB SERPL-MCNC: 0.3 MG/DL (ref 0.2–1.3)
BUN SERPL-MCNC: 10 MG/DL (ref 7–30)
CALCIUM SERPL-MCNC: 8.9 MG/DL (ref 8.5–10.1)
CHLORIDE SERPL-SCNC: 107 MMOL/L (ref 94–109)
CO2 SERPL-SCNC: 28 MMOL/L (ref 20–32)
CREAT SERPL-MCNC: 0.76 MG/DL (ref 0.52–1.04)
GFR SERPL CREATININE-BSD FRML MDRD: 80 ML/MIN/1.7M2
GLUCOSE SERPL-MCNC: 149 MG/DL (ref 70–99)
POTASSIUM SERPL-SCNC: 3.8 MMOL/L (ref 3.4–5.3)
PROT SERPL-MCNC: 6.5 G/DL (ref 6.8–8.8)
SODIUM SERPL-SCNC: 139 MMOL/L (ref 133–144)

## 2018-05-02 PROCEDURE — 36415 COLL VENOUS BLD VENIPUNCTURE: CPT | Performed by: FAMILY MEDICINE

## 2018-05-02 PROCEDURE — 40000099 ZZH STATISTIC LYMPHEDEMA VISIT: Performed by: REHABILITATION PRACTITIONER

## 2018-05-02 PROCEDURE — 99214 OFFICE O/P EST MOD 30 MIN: CPT | Performed by: FAMILY MEDICINE

## 2018-05-02 PROCEDURE — 97140 MANUAL THERAPY 1/> REGIONS: CPT | Mod: GP | Performed by: REHABILITATION PRACTITIONER

## 2018-05-02 PROCEDURE — 80053 COMPREHEN METABOLIC PANEL: CPT | Performed by: FAMILY MEDICINE

## 2018-05-02 RX ORDER — FUROSEMIDE 20 MG
40 TABLET ORAL 2 TIMES DAILY
Qty: 180 TABLET | Refills: 0 | Status: SHIPPED | OUTPATIENT
Start: 2018-05-02 | End: 2018-06-20

## 2018-05-02 RX ORDER — EPINEPHRINE 0.3 MG/.3ML
0.3 INJECTION SUBCUTANEOUS
Qty: 0.6 ML | Refills: 0 | Status: SHIPPED | OUTPATIENT
Start: 2018-05-02 | End: 2019-06-12

## 2018-05-02 RX ORDER — TRAZODONE HYDROCHLORIDE 150 MG/1
TABLET ORAL
Qty: 30 TABLET | Refills: 0 | Status: CANCELLED | OUTPATIENT
Start: 2018-05-02

## 2018-05-02 RX ORDER — TRAZODONE HYDROCHLORIDE 150 MG/1
TABLET ORAL
Qty: 30 TABLET | Refills: 0 | Status: SHIPPED | OUTPATIENT
Start: 2018-05-02 | End: 2018-05-30

## 2018-05-02 ASSESSMENT — PAIN SCALES - GENERAL: PAINLEVEL: SEVERE PAIN (7)

## 2018-05-02 NOTE — NURSING NOTE
"Chief Complaint   Patient presents with     Swelling       Initial /63 (BP Location: Right arm, Patient Position: Sitting, Cuff Size: Adult Large)  Pulse 84  Temp 98.1  F (36.7  C) (Tympanic)  Ht 4' 11\" (1.499 m)  Wt 233 lb 12.8 oz (106.1 kg)  LMP 09/27/2009  SpO2 92%  BMI 47.22 kg/m2 Estimated body mass index is 47.22 kg/(m^2) as calculated from the following:    Height as of this encounter: 4' 11\" (1.499 m).    Weight as of this encounter: 233 lb 12.8 oz (106.1 kg).  Medication Reconciliation: complete   Jennifer Harris CMA  "

## 2018-05-02 NOTE — PROGRESS NOTES
SUBJECTIVE:                                                    Bia Bill is a 52 year old female who presents to clinic today for the following health issues:    Chief Complaint   Patient presents with     Swelling     **She is here to have her legs looked at. She is having bilateral edema.   Wt Readings from Last 10 Encounters:   05/02/18 233 lb 12.8 oz (106.1 kg)   04/24/18 223 lb (101.2 kg)   03/30/18 223 lb 14.4 oz (101.6 kg)   03/23/18 224 lb 14.4 oz (102 kg)   03/22/18 224 lb 13.9 oz (102 kg)   03/14/18 224 lb 14.4 oz (102 kg)   12/01/17 221 lb (100.2 kg)   11/13/17 220 lb 3.2 oz (99.9 kg)   11/08/17 220 lb 8 oz (100 kg)   11/07/17 219 lb (99.3 kg)       Using compression wraps on lower legs which is working but having more thigh edema  Has gaine a lot of waeight this wekened  She did report a large sodium amount in diet this was reviewed  Problem list and histories reviewed & adjusted, as indicated.  Additional history:     Patient Active Problem List   Diagnosis     Mild persistent asthma     Polyneuropathy in other diseases classified elsewhere (H)     DVT (deep venous thrombosis) (H)     Alcohol abuse, in remission     SECONDARY POLYCYTHEMIA     Chondromalacia of patella     Sensorineural hearing loss, asymmetrical     Developmental reading disorder     Esophageal reflux     Hypothyroidism     Fatty liver     GILES (Obstructive Sleep Apnea)-Moderate (AHI 16)     RLS (restless legs syndrome)     Smoker     Erythrocytosis     Moderate mixed bipolar I disorder (H)     Personality disorder, depressive     COPD (chronic obstructive pulmonary disease) (H)     Current use of long term anticoagulation     Rosacea     Health Care Home     DDD (degenerative disc disease), cervical     Benign neoplasm of colon (POLYPOSIS)     Stroke (H)     Somatization disorder     Sebaceous cyst of right axilla     HTN, goal below 140/90     Left leg pain     Vitamin D deficiency     Long term current use of anticoagulant  "therapy     Morbid obesity (H)     PVD (peripheral vascular disease) with claudication (H)     Cyst of left ovary     Morbid obesity with alveolar hypoventilation (H)     GI bleed     Severe episode of recurrent major depressive disorder (H)     Conductive hearing loss of left ear with restricted hearing of right ear     Sensorineural hearing loss (SNHL) of right ear with restricted hearing of left ear     Past Surgical History:   Procedure Laterality Date     BILIARY STENT SINGLE USE COV      3 stints in left leg     BONE MARROW BIOPSY, BONE SPECIMEN, NEEDLE/TROCAR N/A 11/17/2014    Procedure: BIOPSY BONE MARROW;  Surgeon: Hay Aaron MD;  Location: [facility]     D & C  10/26/09    with uterine ablation     ESOPHAGOSCOPY, GASTROSCOPY, DUODENOSCOPY (EGD), COMBINED  4/21/2014    Procedure: Gastroscopy;  Surgeon: Moris Thomas MD;  Location: [facility]     HYSTERECTOMY, PAP NO LONGER INDICATED  1-4-2010     LITHOTRIPSY  2004    Lithotrypsy       Social History   Substance Use Topics     Smoking status: Current Every Day Smoker     Packs/day: 0.50     Years: 34.00     Types: Cigarettes     Smokeless tobacco: Never Used      Comment: started at age 11.  Quit during pregnancyX4.  Quit 3 months ago.      Alcohol use No      Comment: quit 1995     Family History   Problem Relation Age of Onset     Hypertension Mother      DIABETES Mother      Blood Disease Mother      HEART DISEASE Mother      chf     CEREBROVASCULAR DISEASE Mother      Hypertension Father      CANCER Father      lung cancer     Allergies Sister      Depression Sister      Hypertension Sister            ROS:  Constitutional, HEENT, cardiovascular, pulmonary, gi and gu systems are negative, except as otherwise noted.    OBJECTIVE:                                                    /63 (BP Location: Right arm, Patient Position: Sitting, Cuff Size: Adult Large)  Pulse 84  Temp 98.1  F (36.7  C) (Tympanic)  Ht 4' 11\" (1.499 m)  Wt 233 " lb 12.8 oz (106.1 kg)  LMP 09/27/2009  SpO2 92%  BMI 47.22 kg/m2 Body mass index is 47.22 kg/(m^2).   GENERAL: healthy, alert, well nourished, well hydrated, no distress  HENT: ear canals- normal; TMs- normal; Nose- normal; Mouth- no ulcers, no lesions  NECK: no tenderness, no adenopathy, no asymmetry, no masses, no stiffness; thyroid- normal to palpation  RESP: lungs clear to auscultation - no rales, no rhonchi, no wheezes  CV: regular rates and rhythm, normal S1 S2, no S3 or S4 and no murmur, no click or rub -  ABDOMEN: soft, no tenderness, no  hepatosplenomegaly, no masses, normal bowel sounds  Legs 3+ pitting edema up to upper thighs  No redness no tender in calfs  hokmans negative      ASSESSMENT/PLAN:                                                      (I89.0) Lymphedema of both lower extremities  (primary encounter diagnosis)  Comment:  Modest gains in lower legs but fluid gained in upper legs and abdomin.  larisa work on sodium intake,  Will ncrease diuretic for shoe rt term at least to 40 mg twice daily.    Recheck in 2 weeks.,  F/u lymphedema clinic    Plan: Comprehensive metabolic panel (BMP + Alb, Alk         Phos, ALT, AST, Total. Bili, TP), furosemide         (LASIX) 20 MG tablet            (F33.1) Major depressive disorder, recurrent episode, moderate (H)  Comment: Good control.  Continue currant medications, continue to monito   Plan: traZODone (DESYREL) 150 MG tablet            (T63.444D) Anaphylactic reaction to bee sting, undetermined intent, subsequent encounter  Plan: EPINEPHrine (EPIPEN/ADRENACLICK/OR ANY BX         GENERIC EQUIV) 0.3 MG/0.3ML injection 2-pack               reports that she has been smoking Cigarettes.  She has a 17.00 pack-year smoking history. She has never used smokeless tobacco.    [facility]

## 2018-05-02 NOTE — MR AVS SNAPSHOT
After Visit Summary   5/2/2018    Bia Bill    MRN: [id number]           Patient Information     Date Of Birth          1965        Visit Information        Provider Department      5/2/2018 1:00 PM Kd Leong MD [facility]        Today's Diagnoses     Lymphedema of both lower extremities    -  1    Major depressive disorder, recurrent episode, moderate (H)        Anaphylactic reaction to bee sting, undetermined intent, subsequent encounter           Follow-ups after your visit        Your next 10 appointments already scheduled     May 03, 2018  9:30 AM CDT   Return Visit with Kd [place]   [facility] ([facility])    [address] [address]   [phone]            May 03, 2018 11:00 AM CDT   New Visit with Rajinder Bose MD   [facility] ([facility])    [address] [address]   [phone]            May 03, 2018  1:00 PM CDT   [facility]    [address] [address]   [phone]            May 04, 2018  9:45 AM CDT   Treatment with Cassia Montoya PTA   [facility]    [address] [address]   [phone]            May 16, 2018 10:00 AM CDT   SHORT with Kd Leong MD   [facility] ([facility]    [address] [address]   [phone]            May 21, 2018  9:30 AM CDT   Return Visit with Kd [place]   [facility] ([facility])    [address] [address]   [phone]            Jun 22, 2018  1:00 PM CDT   LAB with [facility] ([facility])    [address] [address]   [phone]           Please do not  "eat 10-12 hours before your appointment if you are coming in fasting for labs on lipids, cholesterol, or glucose (sugar). This does not apply to pregnant women. Water, hot tea and black coffee (with nothing added) are okay. Do not drink other fluids, diet soda or chew gum.            Sep 21, 2018  1:00 PM CDT   LAB with [facility] ([facility])    [address] [place]  [facility] [address]   [phone]           Please do not eat 10-12 hours before your appointment if you are coming in fasting for labs on lipids, cholesterol, or glucose (sugar). This does not apply to pregnant women. Water, hot tea and black coffee (with nothing added) are okay. Do not drink other fluids, diet soda or chew gum.            Sep 28, 2018  1:30 PM CDT   Return Visit with Joshua Watts MD   [facility] ([facility])    [facility]  [address]  [facility] [address]   [phone]              Who to contact     Normal or non-critical lab and imaging results will be communicated to you by MyChart, letter or phone within 4 business days after the clinic has received the results. If you do not hear from us within 7 days, please contact the clinic through [employer] or phone. If you have a critical or abnormal lab result, we will notify you by phone as soon as possible.  Submit refill requests through [employer] or call your pharmacy and they will forward the refill request to us. Please allow 3 business days for your refill to be completed.          If you need to speak with a  for additional information , please call: [phone]             Additional Information About Your Visit        [employer] Information     [employer] lets you send messages to your doctor, view your test results, renew your prescriptions, schedule appointments and more. To sign up, go to [email or web address] . Click on \"Log in\" on the left side of the " "screen, which will take you to the Welcome page. Then click on \"Sign up Now\" on the right side of the page.     You will be asked to enter the access code listed below, as well as some personal information. Please follow the directions to create your username and password.     Your access code is: [email or web address]  Expires: 2018  1:56 PM     Your access code will  in 90 days. If you need help or a new code, please call your [place] clinic or [phone].        Care EveryWhere ID     This is your Care EveryWhere ID. This could be used by other organizations to access your [place] medical records  [id number]        Your Vitals Were     Pulse Temperature Height Last Period Pulse Oximetry BMI (Body Mass Index)    84 98.1  F (36.7  C) (Tympanic) 4' 11\" (1.499 m) 2009 92% 47.22 kg/m2       Blood Pressure from Last 3 Encounters:   18 130/63   18 130/69   18 132/73    Weight from Last 3 Encounters:   18 233 lb 12.8 oz (106.1 kg)   18 223 lb (101.2 kg)   18 223 lb 14.4 oz (101.6 kg)              We Performed the Following     Comprehensive metabolic panel (BMP + Alb, Alk Phos, ALT, AST, Total. Bili, TP)          Today's Medication Changes          These changes are accurate as of 18  1:56 PM.  If you have any questions, ask your nurse or doctor.               These medicines have changed or have updated prescriptions.        Dose/Directions    furosemide 20 MG tablet   Commonly known as:  LASIX   This may have changed:  See the new instructions.   Used for:  Lymphedema of both lower extremities        Dose:  40 mg   Take 2 tablets (40 mg) by mouth 2 times daily   Quantity:  180 tablet   Refills:  0       traZODone 150 MG tablet   Commonly known as:  DESYREL   This may have changed:  See the new instructions.   Used for:  Major depressive disorder, recurrent episode, moderate (H)        TAKE ONE TABLET BY MOUTH NIGHTLY AT BEDTIME   Quantity:  30 tablet   Refills:  0    "      Stop taking these medicines if you haven't already. Please contact your care team if you have questions.     levofloxacin 750 MG tablet   Commonly known as:  LEVAQUIN           predniSONE 20 MG tablet   Commonly known as:  DELTASONE                Where to get your medicines      These medications were sent to [id number] - [place], [place] - [address]  [address] [facility] [address]    Hours:  Closed 10-16-08 business to [facility] Phone:  [phone]     EPINEPHrine 0.3 MG/0.3ML injection 2-pack    furosemide 20 MG tablet    traZODone 150 MG tablet                Primary Care Provider Office Phone # Fax #    Kd Leong [phone]       [facility] [address] [facility] [address]        Equal Access to Services     ANTIONETTE IRBY AH: Elfego estrellao Sojossy, waaxda luqadaha, qaybta kaalmada jalilyafrancis, nitin craig. So [facility] [phone].    ATENCIÓN: Si habla español, tiene a terry disposición servicios gratuitos de asistencia lingüística. [facility] [phone].    We comply with applicable federal civil rights laws and [place] laws. We do not discriminate on the basis of race, color, national origin, age, disability, sex, sexual orientation, or gender identity.            Thank you!     Thank you for choosing [facility]  for your care. Our goal is always to provide you with excellent care. Hearing back from our patients is one way we can continue to improve our services. Please take a few minutes to complete the written survey that you may receive in the mail after your visit with us. Thank you!             Your Updated Medication List - Protect others around you: Learn how to safely use, store and throw away your medicines at www.disposemymeds.org.          This list is accurate as of 5/2/18  1:56 PM.  Always use your most recent med list.                   Brand Name Dispense Instructions for use  Diagnosis    acetaminophen 500 MG tablet    TYLENOL     Take 500-1,000 mg by mouth every 6 hours as needed for mild pain        * albuterol 108 (90 Base) MCG/ACT Inhaler    PROAIR HFA/PROVENTIL HFA/VENTOLIN HFA    3 Inhaler    Inhale 2 puffs into the lungs every 6 hours as needed for shortness of breath / dyspnea or wheezing    Mild persistent asthma without complication       * albuterol (2.5 MG/3ML) 0.083% neb solution     1 Box    Take 1 vial (2.5 mg) by nebulization every 6 hours as needed for shortness of breath / dyspnea or wheezing    Mild persistent asthma without complication       allopurinol 100 MG tablet    ZYLOPRIM    90 tablet    TAKE ONE TABLET BY MOUTH ONE TIME DAILY    Acute gouty arthritis       ARIPiprazole 5 MG tablet    ABILIFY    180 tablet    Take 1.5 tablets (7.5 mg) by mouth At Bedtime    Major depressive disorder, recurrent episode, moderate (H)       buPROPion 150 MG 24 hr tablet    WELLBUTRIN XL    90 tablet    TAKE  ONE TABLET BY MOUTH EVERY DAY IN THE MORNING    COPD exacerbation (H)       calcium citrate-vitamin D 315-250 MG-UNIT Tabs per tablet    CITRACAL     Take 2 tablets by mouth daily        EPINEPHrine 0.3 MG/0.3ML injection 2-pack    EPIPEN/ADRENACLICK/or ANY BX GENERIC EQUIV    0.6 mL    Inject 0.3 mLs (0.3 mg) into the muscle once as needed for anaphylaxis    Anaphylactic reaction to bee sting, undetermined intent, subsequent encounter       fluticasone 50 MCG/ACT spray    FLONASE    1 Bottle    Spray 1-2 sprays into both nostrils daily    Nasal congestion       furosemide 20 MG tablet    LASIX    180 tablet    Take 2 tablets (40 mg) by mouth 2 times daily    Lymphedema of both lower extremities       gabapentin 600 MG tablet    NEURONTIN    90 tablet    TAKE ONE TABLET BY MOUTH THREE TIMES DAILY    Polyneuropathy in other diseases classified elsewhere (H)       ipratropium - albuterol 0.5 mg/2.5 mg/3 mL 0.5-2.5 (3) MG/3ML neb solution    DUONEB    30 vial    Take 1 vial (3  "mLs) by nebulization every 6 hours as needed for shortness of breath / dyspnea or wheezing    Chronic obstructive pulmonary disease, unspecified COPD type (H)       lamoTRIgine 200 MG tablet    LaMICtal    90 tablet    Take 1 tablet (200 mg) by mouth daily    Major depressive disorder, recurrent episode, moderate (H)       levothyroxine 150 MCG tablet    SYNTHROID/LEVOTHROID    90 tablet    TAKE ONE TABLET BY MOUTH DAILY    Hypothyroidism, unspecified type       metoclopramide 10 MG tablet    REGLAN    40 tablet    Take 1 tablet (10 mg) by mouth 4 times daily as needed    Flatulence, eructation, and gas pain       mometasone-formoterol 200-5 MCG/ACT oral inhaler    DULERA    13 g    Inhale 2 puffs into the lungs 2 times daily    COPD exacerbation (H), Chronic obstructive pulmonary disease, unspecified COPD type (H)       omeprazole 20 MG CR capsule    priLOSEC    180 capsule    TAKE ONE CAPSULE BY MOUTH TWICE DAILY    Gastroesophageal reflux disease without esophagitis       ondansetron 4 MG tablet    ZOFRAN    10 tablet    Take 1 tablet (4 mg) by mouth every 8 hours as needed for nausea    Nausea and vomiting, intractability of vomiting not specified, unspecified vomiting type       * order for DME      Equipment being ordered: CPAP AIRSENSE 10 5-18 CM H20 SN# [id number]   DN# [id number]        * order for DME     1 Month    Equipment being ordered: INCONTINENCE PADS  QID    Other urinary incontinence       * order for DME     60 Month    Equipment being ordered: DEPENDS SIZE LARGE    Mixed incontinence       * order for DME     2 Units    Equipment being ordered: [id number]\" x2pair   \"20-30mmHg Farrow Hybrid Liners\" x 2 pair    Peripheral edema       order for DME     1 Device    Equipment being ordered: Nebulizer    COPD exacerbation (H), Chronic obstructive pulmonary disease, unspecified COPD type (H)       order for DME     1 each    Equipment being ordered:x2 [employer] 30/40mmHg compression wraps with x2 extra prs " of compression liners    Secondary lymphedema       simvastatin 20 MG tablet    ZOCOR    30 tablet    TAKE ONE TABLET BY MOUTH NIGHTLY AT BEDTIME    Hypercholesteremia       traZODone 150 MG tablet    DESYREL    30 tablet    TAKE ONE TABLET BY MOUTH NIGHTLY AT BEDTIME    Major depressive disorder, recurrent episode, moderate (H)       warfarin 5 MG tablet    COUMADIN    180 tablet    Take 12.5 mg on Wed and Sat 10 mg all other days or as directed by the Anticoagulation Clinic    DVT (deep venous thrombosis) (H)       * Notice:  This list has 6 medication(s) that are the same as other medications prescribed for you. Read the directions carefully, and ask your doctor or other care provider to review them with you.

## 2018-05-03 ENCOUNTER — OFFICE VISIT (OUTPATIENT)
Dept: CARDIOLOGY | Facility: CLINIC | Age: 53
End: 2018-05-03
Attending: FAMILY MEDICINE
Payer: COMMERCIAL

## 2018-05-03 ENCOUNTER — OFFICE VISIT (OUTPATIENT)
Dept: AUDIOLOGY | Facility: CLINIC | Age: 53
End: 2018-05-03
Payer: COMMERCIAL

## 2018-05-03 ENCOUNTER — OFFICE VISIT (OUTPATIENT)
Dept: PSYCHOLOGY | Facility: CLINIC | Age: 53
End: 2018-05-03
Payer: COMMERCIAL

## 2018-05-03 ENCOUNTER — ANTICOAGULATION THERAPY VISIT (OUTPATIENT)
Dept: ANTICOAGULATION | Facility: CLINIC | Age: 53
End: 2018-05-03

## 2018-05-03 VITALS
WEIGHT: 231.8 LBS | SYSTOLIC BLOOD PRESSURE: 145 MMHG | HEART RATE: 76 BPM | DIASTOLIC BLOOD PRESSURE: 71 MMHG | OXYGEN SATURATION: 94 % | BODY MASS INDEX: 46.82 KG/M2

## 2018-05-03 DIAGNOSIS — R07.89 CHEST TIGHTNESS: ICD-10-CM

## 2018-05-03 DIAGNOSIS — I73.9 PVD (PERIPHERAL VASCULAR DISEASE) WITH CLAUDICATION (H): ICD-10-CM

## 2018-05-03 DIAGNOSIS — Z79.01 LONG TERM CURRENT USE OF ANTICOAGULANT THERAPY: ICD-10-CM

## 2018-05-03 DIAGNOSIS — I82.409 DVT (DEEP VENOUS THROMBOSIS) (H): ICD-10-CM

## 2018-05-03 DIAGNOSIS — H90.A21 SENSORINEURAL HEARING LOSS (SNHL) OF RIGHT EAR WITH RESTRICTED HEARING OF LEFT EAR: ICD-10-CM

## 2018-05-03 DIAGNOSIS — D75.1 ERYTHROCYTOSIS: Chronic | ICD-10-CM

## 2018-05-03 DIAGNOSIS — I73.9 PERIPHERAL VASCULAR DISEASE WITH CLAUDICATION (H): ICD-10-CM

## 2018-05-03 DIAGNOSIS — F31.62 MODERATE MIXED BIPOLAR I DISORDER (H): Primary | ICD-10-CM

## 2018-05-03 DIAGNOSIS — H90.A12 CONDUCTIVE HEARING LOSS OF LEFT EAR WITH RESTRICTED HEARING OF RIGHT EAR: Primary | ICD-10-CM

## 2018-05-03 DIAGNOSIS — R00.2 PALPITATIONS: Primary | ICD-10-CM

## 2018-05-03 LAB
HCT VFR BLD AUTO: 59.4 % (ref 35–47)
HGB BLD-MCNC: 19 G/DL (ref 11.7–15.7)
INR PPP: 2.73 (ref 0.86–1.14)

## 2018-05-03 PROCEDURE — 99207 ZZC NO CHARGE LOS: CPT | Performed by: AUDIOLOGIST

## 2018-05-03 PROCEDURE — 99207 ZZC NO CHARGE NURSE ONLY: CPT

## 2018-05-03 PROCEDURE — 85014 HEMATOCRIT: CPT | Performed by: FAMILY MEDICINE

## 2018-05-03 PROCEDURE — 85018 HEMOGLOBIN: CPT | Performed by: FAMILY MEDICINE

## 2018-05-03 PROCEDURE — 90834 PSYTX W PT 45 MINUTES: CPT | Performed by: PSYCHOLOGIST

## 2018-05-03 PROCEDURE — 99204 OFFICE O/P NEW MOD 45 MIN: CPT | Performed by: INTERNAL MEDICINE

## 2018-05-03 PROCEDURE — 85610 PROTHROMBIN TIME: CPT | Performed by: FAMILY MEDICINE

## 2018-05-03 PROCEDURE — 92591 HC HEARING AID EXAM BINAURAL: CPT | Performed by: AUDIOLOGIST

## 2018-05-03 PROCEDURE — 36415 COLL VENOUS BLD VENIPUNCTURE: CPT | Performed by: FAMILY MEDICINE

## 2018-05-03 ASSESSMENT — ASTHMA QUESTIONNAIRES: ACT_TOTALSCORE: 19

## 2018-05-03 NOTE — LETTER
5/3/2018    Kd Leong MD  [address] [facility] [address]    RE: Bia Bill       Dear Colleague,    I had the pleasure of seeing Bia Bill in the [facility].    CARDIOLOGY CONSULT    REASON FOR CONSULT: chest pain, palpitations    PRIMARY CARE PHYSICIAN:  Kd Leong    HISTORY OF PRESENT ILLNESS:  52-year-old female with no cardiac history is seen for chest tightness and palpitations.    She has a history of DVT, secondary polycythemia, fatty liver, sleep apnea, tobacco abuse, and COPD.     She reports a lot of family issues of heart disease.  Her father had coronary disease at an older age, mother had heart failure, etiology unknown.  She has 3 sisters with heart attacks, she thinks they were in the 40s and 50s.    Patient will do only light activity.  She had significant lymphedema which limits her walking.  She will use a cane and walks at a slow pace.  She denies any exertional chest pain.  She has some mild dyspnea at baseline.    In the past few weeks she has had some random episodes of chest pain or tightness.  This can occur up to 4 times per day.  She also notices some palpitations lasting intermittently throughout the day.  She feels a skipping sensation, but no sustained racing heartbeat.    She states her blood pressure is up and down, but generally fairly well-controlled.    Echo March 2018 showed EF 60%, mild LVH, normal RV, no valve disease.     PAST MEDICAL HISTORY:  Past Medical History:   Diagnosis Date     Acromioclavicular joint arthritis 1/25/2017     Acute bronchospasm 8/15/2016     Acute gouty arthritis 8/4/2014     Anxiety state, unspecified      Bipolar I disorder, most recent episode (or current) unspecified      Closed fracture of metatarsal bone 6/5/2007     Depressive disorder, not elsewhere classified      DVT, lower extremity (H)      Headache 10/17/2014     Impingement syndrome of shoulder region 1/25/2017      Polycythemia, secondary     [place] HGB      Right shoulder pain 10/14/2014     Trochanteric bursitis of both hips 1/11/2016       MEDICATIONS:  Current Outpatient Prescriptions   Medication     acetaminophen (TYLENOL) 500 MG tablet     albuterol (2.5 MG/3ML) 0.083% neb solution     albuterol (PROAIR HFA/PROVENTIL HFA/VENTOLIN HFA) 108 (90 BASE) MCG/ACT Inhaler     allopurinol (ZYLOPRIM) 100 MG tablet     ARIPiprazole (ABILIFY) 5 MG tablet     buPROPion (WELLBUTRIN XL) 150 MG 24 hr tablet     calcium citrate-vitamin D (CITRACAL) 315-250 MG-UNIT TABS per tablet     EPINEPHrine (EPIPEN/ADRENACLICK/OR ANY BX GENERIC EQUIV) 0.3 MG/0.3ML injection 2-pack     fluticasone (FLONASE) 50 MCG/ACT spray     furosemide (LASIX) 20 MG tablet     gabapentin (NEURONTIN) 600 MG tablet     ipratropium - albuterol 0.5 mg/2.5 mg/3 mL (DUONEB) 0.5-2.5 (3) MG/3ML neb solution     lamoTRIgine (LAMICTAL) 200 MG tablet     levothyroxine (SYNTHROID/LEVOTHROID) 150 MCG tablet     metoclopramide (REGLAN) 10 MG tablet     mometasone-formoterol (DULERA) 200-5 MCG/ACT oral inhaler     omeprazole (PRILOSEC) 20 MG CR capsule     ondansetron (ZOFRAN) 4 MG tablet     order for DME     order for DME     order for DME     order for DME     order for DME     order for DME     simvastatin (ZOCOR) 20 MG tablet     traZODone (DESYREL) 150 MG tablet     warfarin (COUMADIN) 5 MG tablet     No current facility-administered medications for this visit.        ALLERGIES:  Allergies   Allergen Reactions     Darvocet [Propoxyphene N-Apap] Anaphylaxis     Darvocet,Percocet      Percocet [Oxycodone-Acetaminophen] Anaphylaxis, Hives and Swelling     Has tolerated hydromorphone in the past.      Asa [Aspirin] Hives     Aspirin causes seizures and hives, throat swelling.   Has tolerated ketorolac in the past.        Bee      Ibuprofen Swelling     Throat swelling per patient      Lyrica [Pregabalin] Other (See Comments) and Swelling     dizziness     Povidone Iodine  Rash     Reaction to topical betadine     Tape [Adhesive Tape] Rash       SOCIAL HISTORY:  I have reviewed this patient's social history and updated it with pertinent information if needed. Bia Bill  reports that she has been smoking Cigarettes.  She has a 17.00 pack-year smoking history. She has never used smokeless tobacco. She reports that she does not drink alcohol or use illicit drugs.    FAMILY HISTORY:  I have reviewed this patient's family history and updated it with pertinent information if needed.   Family History   Problem Relation Age of Onset     Hypertension Mother      DIABETES Mother      Blood Disease Mother      HEART DISEASE Mother      chf     CEREBROVASCULAR DISEASE Mother      Hypertension Father      CANCER Father      lung cancer     Allergies Sister      Depression Sister      Hypertension Sister        REVIEW OF SYSTEMS:  Constitutional:  No weight loss, fever, chills, weakness or fatigue.  HEENT:  Eyes:  No visual loss, blurred vision, double vision or yellow sclerae. No hearing loss, sneezing, congestion, runny nose or sore throat.  Skin:  No rash or itching.  Cardiovascular: per HPI  Respiratory: per HPI  GI:  No anorexia, nausea, vomiting or diarrhea. No abdominal pain or blood.  :  No dysurea, hematuria  Neurologic:  No headache, dizziness, syncope, paralysis, ataxia, numbness or tingling in the extremities. No change in bowel or bladder control.  Musculoskeletal:  No muscle, back pain, joint pain or stiffness.  Hematologic:  No anemia, bleeding or bruising.  Lymphatics:  No enlarged nodes. No history of splenectomy.  Psychiatric:  No history of depression or anxiety.  Endocrine:  No reports of sweating, cold or heat intolerance. No polyuria or polydipsia.  Allergies:  No history of asthma, hives, eczema or rhinitis.    PHYSICAL EXAM:      BP: 145/71 Pulse: 76     SpO2: 94 %      Vital Signs with Ranges  Temp:  [98.1  F (36.7  C)] 98.1  F (36.7  C)  Pulse:  [76-84] 76  BP:  (130-145)/(63-71) 145/71  SpO2:  [92 %-94 %] 94 %  231 lbs 12.8 oz    Constitutional: awake, alert, no distress  Eyes: PERRL, sclera nonicteric  ENT: trachea midline  Respiratory: lungs clear  Cardiovascular: distant sounds, RRR, no murmurs  GI: nondistended, nontender, bowel sounds present  Lymph/Hematologic: no lymphadenopathy  Skin: dry, no rash  Musculoskeletal: good muscle tone, strength 5/5 in upper and lower extremities  Neurologic: no focal deficits  Neuropsychiatric: appropriate affact    DATA:  Labs:   April 2018: Cholesterol 187, triglycerides 198, HDL 46,    March 2018: NT proBNP 12    EKG, March 22, 2018: Sinus rhythm, rate 70, normal axis and intervals     ASSESSMENT:  52-year-old female seen for chest tightness and palpitations.  Her symptoms are little more atypical for angina, however she has a fairly strong risk factor profile, especially with her tobacco abuse and family history.  Lexiscan nuclear stress test will be ordered.  With her high BMI, expected image quality may not be great, but we mainly want to rule out more significant areas of ischemia.    A 3 day Zile patch will be ordered for her palpitations.  She could have some symptomatic ectopy, there is a lower suspicion for any sustained arrhythmias.    Her lower extremity edema likely is lymphedema.  Her echo essentially was normal in March and her BNP was only 12, making a cardiac component to her edema very unlikely.    RECOMMENDATIONS:  1.  Chest tightness, lower suspicion for obstructive coronary disease  -Lexiscan nuclear stress     2.  Help rotations  - 3 day Zio patch    Follow-up as needed.    Rajinder Bose MD  Cardiology - [facility]  Pager:  [phone]  Text Page  May 3, 2018      Thank you for allowing me to participate in the care of your patient.    Sincerely,     Rajinder Bose MD     [facility]    cc:   Joanna Magdaleno MD  [address]  [id number]

## 2018-05-03 NOTE — MR AVS SNAPSHOT
Bia Bill   5/3/2018   Anticoagulation Therapy Visit    Description:  52 year old female   Provider:  Angelina Nj, RN   Department:  [facility]           INR as of 5/3/2018     Today's INR 2.73!      Anticoagulation Summary as of 5/3/2018     INR goal 2.0-2.5   Today's INR 2.73!   Full instructions 12.5 mg on Wed; 10 mg all other days   Next INR check 5/21/2018    Indications   Long term current use of anticoagulant therapy [Z79.01]  DVT (deep venous thrombosis) (H) [I82.409]  PVD (peripheral vascular disease) with claudication (H) [I73.9]         Description             May 2018 Details    Sun Mon Tue Wed Thu Fri Sat       1               2               3      10 mg   See details      4      10 mg         5      10 mg           6      10 mg         7      10 mg         8      10 mg         9      12.5 mg         10      10 mg         11      10 mg         12      10 mg           13      10 mg         14      10 mg         15      10 mg         16      12.5 mg         17      10 mg         18      10 mg         19      10 mg           20      10 mg         21            22               23               24               25               26                 27               28               29               30               31                  Date Details   05/03 This INR check       Date of next INR:  5/21/2018         How to take your warfarin dose     To take:  10 mg Take 2 of the 5 mg tablets.    To take:  12.5 mg Take 2.5 of the 5 mg tablets.

## 2018-05-03 NOTE — PATIENT INSTRUCTIONS
Zio patch  Will order a Zio monitor.  This is a heart monitor that continuously records all your heartbeats.  If you have any symptoms, there is a button you can push that records when you had your symptoms.  The monitor is returned to the company in the mail and the report is sent to us.  This will show what your average heart rate is and if there are any rhythm issues with the heart including when you may have reported any symptoms.  You are encouraged to do all your regular activities while wearing this monitor.    Medication induced nuclear stress test  Will schedule a medication induced nuclear stress test.  This is a test where we inject an IV medication that increased the blood flow to the heart, which is a substitute for exercise.  Before and after this step, a different IV medication is administered that measures the blood flow to the heart.  This is assessed by laying in a special scanner.  If there are any areas of decreased blood flow or any abnormal findings on the stress test, usually a coronary angiogram is recommended as the next step at a later date.  This is a procedure where a catheter is inserted up to the heart and we can determine if there are any severe blockages.    Your test will be done at [facility].  It will take several hours to complete the entire protocol.

## 2018-05-03 NOTE — MR AVS SNAPSHOT
After Visit Summary   5/3/2018    Bia Bill    MRN: [id number]           Patient Information     Date Of Birth          1965        Visit Information        Provider Department      5/3/2018 11:00 AM Rajinder Bose MD [facility]        Today's Diagnoses     Palpitations    -  1    Chest tightness          Care Instructions    Zio patch  Will order a Zio monitor.  This is a heart monitor that continuously records all your heartbeats.  If you have any symptoms, there is a button you can push that records when you had your symptoms.  The monitor is returned to the company in the mail and the report is sent to us.  This will show what your average heart rate is and if there are any rhythm issues with the heart including when you may have reported any symptoms.  You are encouraged to do all your regular activities while wearing this monitor.    Medication induced nuclear stress test  Will schedule a medication induced nuclear stress test.  This is a test where we inject an IV medication that increased the blood flow to the heart, which is a substitute for exercise.  Before and after this step, a different IV medication is administered that measures the blood flow to the heart.  This is assessed by laying in a special scanner.  If there are any areas of decreased blood flow or any abnormal findings on the stress test, usually a coronary angiogram is recommended as the next step at a later date.  This is a procedure where a catheter is inserted up to the heart and we can determine if there are any severe blockages.    Your test will be done at [facility].  It will take several hours to complete the entire protocol.               Follow-ups after your visit        Your next 10 appointments already scheduled     May 03, 2018  1:00 PM CDT   Hearing Aid Consult with Hannah Middleton   [facility] ([facility])     [address] [address]   [phone]            May 04, 2018  9:45 AM CDT   Treatment with Cassia Montoya PTA   [facility] Lymphedema ([facility])    [address] [address]   [phone]            May 16, 2018 10:00 AM CDT   SHORT with Kd Leong MD   [facility] ([facility]    [address]  [facility] [address]   [phone]            May 21, 2018  9:30 AM CDT   Return Visit with Kd Morris   [facility] ([facility])    [address] [address]   [phone]            Jun 08, 2018  9:30 AM CDT   Return Visit with Kd Morris   [facility] ([facility])    [address] [address]   [phone]            Jun 22, 2018  1:00 PM CDT   LAB with [facility] ([facility])    [address] [address]   [phone]           Please do not eat 10-12 hours before your appointment if you are coming in fasting for labs on lipids, cholesterol, or glucose (sugar). This does not apply to pregnant women. Water, hot tea and black coffee (with nothing added) are okay. Do not drink other fluids, diet soda or chew gum.            Sep 21, 2018  1:00 PM CDT   LAB with [place] [facility]   [facility] ([facility])    [address] [address]   [phone]           Please do not eat 10-12 hours before your appointment if you are coming in fasting for labs on lipids, cholesterol, or glucose (sugar). This does not apply to pregnant women. Water, hot tea and black coffee (with nothing added) are okay. Do not drink other fluids, diet soda or chew gum.            Sep 28, 2018  1:30 PM CDT   Return Visit with Joshua Watts MD   [facility] ([facility])    [facility]  [address]  [address]  [facility] [address]   [phone]              Future tests that were ordered for you today     Open Future Orders        Priority Expected Expires Ordered    NM Lexiscan stress test (nuc card) Routine 5/10/2018 5/3/2019 5/3/2018    Zio Patch Monitor Routine 5/10/2018 5/3/2019 5/3/2018            Who to contact     If you have questions or need follow up information about today's clinic visit or your schedule please contact [facility] directly at [phone].  Normal or non-critical lab and imaging results will be communicated to you by Vauntehart, letter or phone within 4 business days after the clinic has received the results. If you do not hear from us within 7 days, please contact the clinic through Vauntehart or phone. If you have a critical or abnormal lab result, we will notify you by phone as soon as possible.  Submit refill requests through Gipis or call your pharmacy and they will forward the refill request to us. Please allow 3 business days for your refill to be completed.          Additional Information About Your Visit        Vauntehart Information     Gipis lets you send messages to your doctor, view your test results, renew your prescriptions, schedule appointments and more. To sign up, go to [email or web address] . Click on \"Log in\" on the left side of the screen, which will take you to the Welcome page. Then click on \"Sign up Now\" on the right side of the page.     You will be asked to enter the access code listed below, as well as some personal information. Please follow the directions to create your username and password.     Your access code is: [email or web address]  Expires: 2018  1:56 PM     Your access code will  in 90 days. If you need help or a new code, please call your [place] clinic or [phone].        Care EveryWhere ID     This is your Care EveryWhere ID. This could be used by other organizations to access your [place] medical " records  [id number]        Your Vitals Were     Pulse Last Period Pulse Oximetry BMI (Body Mass Index)          76 09/27/2009 94% 46.82 kg/m2         Blood Pressure from Last 3 Encounters:   05/03/18 145/71   05/02/18 130/63   04/24/18 130/69    Weight from Last 3 Encounters:   05/03/18 105.1 kg (231 lb 12.8 oz)   05/02/18 106.1 kg (233 lb 12.8 oz)   04/24/18 101.2 kg (223 lb)               Primary Care Provider Office Phone # Fax #    Kd Leong [phone] [address] [facility] [address]        Equal Access to Services     ANTIONETTE IRBY : Hadii jose Clark, waemilia rust, qaybta kaalmada obed, nitin craig. So [facility] [phone].    ATENCIÓN: Si habla español, tiene a terry disposición servicios gratuitos de asistencia lingüística. [facility] [phone].    We comply with applicable federal civil rights laws and [place] laws. We do not discriminate on the basis of race, color, national origin, age, disability, sex, sexual orientation, or gender identity.            Thank you!     Thank you for choosing [facility]  for your care. Our goal is always to provide you with excellent care. Hearing back from our patients is one way we can continue to improve our services. Please take a few minutes to complete the written survey that you may receive in the mail after your visit with us. Thank you!             Your Updated Medication List - Protect others around you: Learn how to safely use, store and throw away your medicines at www.disposemymeds.org.          This list is accurate as of 5/3/18 11:14 AM.  Always use your most recent med list.                   Brand Name Dispense Instructions for use Diagnosis    acetaminophen 500 MG tablet    TYLENOL     Take 500-1,000 mg by mouth every 6 hours as needed for mild pain        * albuterol 108 (90 Base) MCG/ACT Inhaler    PROAIR HFA/PROVENTIL HFA/VENTOLIN  HFA    3 Inhaler    Inhale 2 puffs into the lungs every 6 hours as needed for shortness of breath / dyspnea or wheezing    Mild persistent asthma without complication       * albuterol (2.5 MG/3ML) 0.083% neb solution     1 Box    Take 1 vial (2.5 mg) by nebulization every 6 hours as needed for shortness of breath / dyspnea or wheezing    Mild persistent asthma without complication       allopurinol 100 MG tablet    ZYLOPRIM    90 tablet    TAKE ONE TABLET BY MOUTH ONE TIME DAILY    Acute gouty arthritis       ARIPiprazole 5 MG tablet    ABILIFY    180 tablet    Take 1.5 tablets (7.5 mg) by mouth At Bedtime    Major depressive disorder, recurrent episode, moderate (H)       buPROPion 150 MG 24 hr tablet    WELLBUTRIN XL    90 tablet    TAKE  ONE TABLET BY MOUTH EVERY DAY IN THE MORNING    COPD exacerbation (H)       calcium citrate-vitamin D 315-250 MG-UNIT Tabs per tablet    CITRACAL     Take 2 tablets by mouth daily        EPINEPHrine 0.3 MG/0.3ML injection 2-pack    EPIPEN/ADRENACLICK/or ANY BX GENERIC EQUIV    0.6 mL    Inject 0.3 mLs (0.3 mg) into the muscle once as needed for anaphylaxis    Anaphylactic reaction to bee sting, undetermined intent, subsequent encounter       fluticasone 50 MCG/ACT spray    FLONASE    1 Bottle    Spray 1-2 sprays into both nostrils daily    Nasal congestion       furosemide 20 MG tablet    LASIX    180 tablet    Take 2 tablets (40 mg) by mouth 2 times daily    Lymphedema of both lower extremities       gabapentin 600 MG tablet    NEURONTIN    90 tablet    TAKE ONE TABLET BY MOUTH THREE TIMES DAILY    Polyneuropathy in other diseases classified elsewhere (H)       ipratropium - albuterol 0.5 mg/2.5 mg/3 mL 0.5-2.5 (3) MG/3ML neb solution    DUONEB    30 vial    Take 1 vial (3 mLs) by nebulization every 6 hours as needed for shortness of breath / dyspnea or wheezing    Chronic obstructive pulmonary disease, unspecified COPD type (H)       lamoTRIgine 200 MG tablet    LaMICtal    90  "tablet    Take 1 tablet (200 mg) by mouth daily    Major depressive disorder, recurrent episode, moderate (H)       levothyroxine 150 MCG tablet    SYNTHROID/LEVOTHROID    90 tablet    TAKE ONE TABLET BY MOUTH DAILY    Hypothyroidism, unspecified type       metoclopramide 10 MG tablet    REGLAN    40 tablet    Take 1 tablet (10 mg) by mouth 4 times daily as needed    Flatulence, eructation, and gas pain       mometasone-formoterol 200-5 MCG/ACT oral inhaler    DULERA    13 g    Inhale 2 puffs into the lungs 2 times daily    COPD exacerbation (H), Chronic obstructive pulmonary disease, unspecified COPD type (H)       omeprazole 20 MG CR capsule    priLOSEC    180 capsule    TAKE ONE CAPSULE BY MOUTH TWICE DAILY    Gastroesophageal reflux disease without esophagitis       ondansetron 4 MG tablet    ZOFRAN    10 tablet    Take 1 tablet (4 mg) by mouth every 8 hours as needed for nausea    Nausea and vomiting, intractability of vomiting not specified, unspecified vomiting type       * order for DME      Equipment being ordered: CPAP AIRSENSE 10 5-18 CM H20 SN# [id number]   DN# [id number]        * order for DME     1 Month    Equipment being ordered: INCONTINENCE PADS  QID    Other urinary incontinence       * order for DME     60 Month    Equipment being ordered: DEPENDS SIZE LARGE    Mixed incontinence       * order for DME     2 Units    Equipment being ordered: [id number]\" x2pair   \"20-30mmHg Farrow Hybrid Liners\" x 2 pair    Peripheral edema       order for DME     1 Device    Equipment being ordered: Nebulizer    COPD exacerbation (H), Chronic obstructive pulmonary disease, unspecified COPD type (H)       order for DME     1 each    Equipment being ordered:x2 Biacare 30/40mmHg compression wraps with x2 extra prs of compression liners    Secondary lymphedema       simvastatin 20 MG tablet    ZOCOR    30 tablet    TAKE ONE TABLET BY MOUTH NIGHTLY AT BEDTIME    Hypercholesteremia       traZODone 150 MG tablet    DESYREL "    30 tablet    TAKE ONE TABLET BY MOUTH NIGHTLY AT BEDTIME    Major depressive disorder, recurrent episode, moderate (H)       warfarin 5 MG tablet    COUMADIN    180 tablet    Take 12.5 mg on Wed and Sat 10 mg all other days or as directed by the Anticoagulation Clinic    DVT (deep venous thrombosis) (H)       * Notice:  This list has 6 medication(s) that are the same as other medications prescribed for you. Read the directions carefully, and ask your doctor or other care provider to review them with you.

## 2018-05-03 NOTE — MR AVS SNAPSHOT
MRN:[id number]                      After Visit Summary   5/3/2018    Bia Bill    MRN: [id number]           Visit Information        Provider Department      5/3/2018 9:30 AM Kd Morris [facility] Generic      Your next 10 appointments already scheduled     May 07, 2018 11:00 AM CDT   Treatment with Cassia Montoya, PTA   [facility] Lymphedema ([facility])    [address] [address]   [phone]            May 09, 2018 11:30 AM CDT   Treatment with Joseline Santana, PT   [facility] Lymphedema ([facility])    [address] [address]   [phone]            May 09, 2018  1:00 PM CDT   NM MPI WITH LEXISCAN with [facility], [place] STRESS TEST   [facility] Nuclear Medicine ([facility])    [address] [address]   [phone]           For a ONE day exam: Allow 3-4 hours for test. For a TWO day exam: Allow  minutes PER day for test.  On the day of your resting scan: Please stop eating 3 hours before the test. You may drink water or juice.  On the day of your stress test: Stop all caffeine 12 hours before the test. This includes coffee, tea, soda pop, chocolate and certain medicines (such as Anacin, Excedrin and NoDoz). Also avoid decaf coffee and tea, as these contain small amounts of caffeine.  Stop eating 3 hours before the test. You may drink water.  You may need to stop some medicines before the test. Follow your doctor s orders. - If you take a beta blocker: Do not take your beta-blocker on the day before your test, unless specifically told to by your doctor. And do not take it on the day of your test. Bring it with you to take after the test. - If you take Aggrenox or dipyridamole (Persantine, Permole), stop taking it 48 hours before your test. - If you take Viagra, Cialis or Levitra, stop taking it 48 hours before your test. -  "If you take theophylline or aminophylline, stop taking it 12 hours before your test.  Do not take nitrates on the day of your test. Do not wear your Nitro-Patch.  Please wear a loose two-piece outfit. If you will have an exercise test, bring rubber-soled walking shoes.  When you arrive, please tell us if you have a pacemaker or ICD (implantable defibrillator).  Please call your Imaging Department at your exam site with any questions.            May 10, 2018  9:00 AM CDT   Return Visit with Hannah Middleton   [facility] ([facility]    [address] [address]   [phone]            May 11, 2018 10:30 AM CDT   Treatment with Cassia Montoya PTA   [facility] Lymphedema ([facility])    [address] [address]   [phone]            May 14, 2018  9:15 AM CDT   Treatment with Cassia Montoya PTA   [facility] Lymphedema ([facility])    [address] [address]   [phone]            May 16, 2018 10:00 AM CDT   SHORT with Kd Leong MD   [facility] ([facility])    [address] [facility] [address]   [phone]            May 16, 2018  1:00 PM CDT   Treatment with Joseline Santana PT   [facility] Lymphedema ([facility])    [address] [address]   [phone]            May 18, 2018  9:15 AM CDT   Treatment with Cassia Montoya PTA   [facility] Lymphedema ([facility])    [address] [address]   [phone]              Mama Information     Mama lets you send messages to your doctor, view your test results, renew your prescriptions, schedule appointments and more. To sign up, go to [email or web address] . Click on \"Log in\" on the left side of the screen, which will take you to the Welcome page. Then click on \"Sign up Now\" on the right side " of the page.     You will be asked to enter the access code listed below, as well as some personal information. Please follow the directions to create your username and password.     Your access code is: [email or web address]  Expires: 2018  1:56 PM     Your access code will  in 90 days. If you need help or a new code, please call your [place] clinic or [phone].        Care EveryWhere ID     This is your Care EveryWhere ID. This could be used by other organizations to access your [place] medical records  [id number]        Equal Access to Services     [facility] CenterASUNCION : Elfego Clark, waldemar rust, emma clay, nitin craig. So [facility] [phone].    ATENCIÓN: Si habla español, tiene a terry disposición servicios gratuitos de asistencia lingüística. Llame al [phone].    We comply with applicable federal civil rights laws and [place] laws. We do not discriminate on the basis of race, color, national origin, age, disability, sex, sexual orientation, or gender identity.

## 2018-05-03 NOTE — MR AVS SNAPSHOT
After Visit Summary   5/3/2018    Bia Bill    MRN: [id number]           Patient Information     Date Of Birth          1965        Visit Information        Provider Department      5/3/2018 1:00 PM Rhiannon Fong AuD [facility]        Today's Diagnoses     Conductive hearing loss of left ear with restricted hearing of right ear    -  1    Sensorineural hearing loss (SNHL) of right ear with restricted hearing of left ear           Follow-ups after your visit        Your next 10 appointments already scheduled     May 04, 2018  9:45 AM CDT   Treatment with Cassia Montoya PTA   [facility] Lymphedema ([facility])    [address] [address]   [phone]            May 04, 2018 11:00 AM CDT   ZIOPATCH MONITOR with [place] [facility]   [facility] Cardiac Services ([facility])    [address] [address]   [phone]            May 09, 2018  1:00 PM CDT   NM MPI WITH LEXISCAN with [facility], [place] STRESS TEST   [facility] Nuclear Medicine ([facility])    [address] [address]   [phone]           For a ONE day exam: Allow 3-4 hours for test. For a TWO day exam: Allow  minutes PER day for test.  On the day of your resting scan: Please stop eating 3 hours before the test. You may drink water or juice.  On the day of your stress test: Stop all caffeine 12 hours before the test. This includes coffee, tea, soda pop, chocolate and certain medicines (such as Anacin, Excedrin and NoDoz). Also avoid decaf coffee and tea, as these contain small amounts of caffeine.  Stop eating 3 hours before the test. You may drink water.  You may need to stop some medicines before the test. Follow your doctor s orders. - If you take a beta blocker: Do not take your beta-blocker on the day before your test, unless specifically told to by your doctor. And do not take it on the  day of your test. Bring it with you to take after the test. - If you take Aggrenox or dipyridamole (Persantine, Permole), stop taking it 48 hours before your test. - If you take Viagra, Cialis or Levitra, stop taking it 48 hours before your test. - If you take theophylline or aminophylline, stop taking it 12 hours before your test.  Do not take nitrates on the day of your test. Do not wear your Nitro-Patch.  Please wear a loose two-piece outfit. If you will have an exercise test, bring rubber-soled walking shoes.  When you arrive, please tell us if you have a pacemaker or ICD (implantable defibrillator).  Please call your Imaging Department at your exam site with any questions.            May 10, 2018  9:00 AM CDT   Return Visit with Hannah Middleton   [facility] ([facility]    [address] [address]   [phone]            May 16, 2018 10:00 AM CDT   SHORT with Kd Leong MD   [facility] ([facility]    [address] [facility] [address]   [phone]            May 21, 2018  9:30 AM CDT   Return Visit with Kd Morris   [facility] ([facility])    [address] [address]   [phone]            Jun 08, 2018  9:30 AM CDT   Return Visit with Kd Morris   [facility] ([facility])    [address] [address]   [phone]            Jun 22, 2018  1:00 PM CDT   LAB with [facility] ([facility])    [address] [address]   [phone]           Please do not eat 10-12 hours before your appointment if you are coming in fasting for labs on lipids, cholesterol, or glucose (sugar). This does not apply to pregnant women. Water, hot tea and black coffee (with nothing added) are okay. Do not drink other fluids, diet soda or chew gum.            Sep 21, 2018  1:00 PM CDT  "  LAB with [place] [facility]   [facility])    [address]  [facility] [address] [phone]           Please do not eat 10-12 hours before your appointment if you are coming in fasting for labs on lipids, cholesterol, or glucose (sugar). This does not apply to pregnant women. Water, hot tea and black coffee (with nothing added) are okay. Do not drink other fluids, diet soda or chew gum.              Future tests that were ordered for you today     Open Future Orders        Priority Expected Expires Ordered    NM Lexiscan stress test (nuc card) Routine 5/10/2018 5/3/2019 5/3/2018    Zio Patch Monitor Routine 5/10/2018 5/3/2019 5/3/2018            Who to contact     If you have questions or need follow up information about today's clinic visit or your schedule please contact [facility] directly at [phone].  Normal or non-critical lab and imaging results will be communicated to you by [employer], letter or phone within 4 business days after the clinic has received the results. If you do not hear from us within 7 days, please contact the clinic through [employer] or phone. If you have a critical or abnormal lab result, we will notify you by phone as soon as possible.  Submit refill requests through [employer] or call your pharmacy and they will forward the refill request to us. Please allow 3 business days for your refill to be completed.          Additional Information About Your Visit        [employer] Information     [employer] lets you send messages to your doctor, view your test results, renew your prescriptions, schedule appointments and more. To sign up, go to [email or web address] . Click on \"Log in\" on the left side of the screen, which will take you to the Welcome page. Then click on \"Sign up Now\" on the right side of the page.     You will be asked to enter the access code listed below, as well as some personal information. Please follow the directions to " create your username and password.     Your access code is: [email or web address]  Expires: 2018  1:56 PM     Your access code will  in 90 days. If you need help or a new code, please call your [place] clinic or [phone].        Care EveryWhere ID     This is your Care EveryWhere ID. This could be used by other organizations to access your [place] medical records  [id number]        Your Vitals Were     Last Period                   2009            Blood Pressure from Last 3 Encounters:   18 145/71   18 130/63   18 130/69    Weight from Last 3 Encounters:   18 231 lb 12.8 oz (105.1 kg)   18 233 lb 12.8 oz (106.1 kg)   18 223 lb (101.2 kg)              We Performed the Following     HEARING AID EXAM BINAURAL        Primary Care Provider Office Phone # Fax #    Kd Leong [phone] [address] [facility] [address]        Equal Access to Services     [facility]: Hadii aad ku hadasho Soomaali, waaxda luqadaha, qaybta kaalmada adeegyada, nitin roberts . So [facility] [phone].    ATENCIÓN: Si habla español, tiene a terry disposición servicios gratuitos de asistencia lingüística. Llame al [phone].    We comply with applicable federal civil rights laws and [place] laws. We do not discriminate on the basis of race, color, national origin, age, disability, sex, sexual orientation, or gender identity.            Thank you!     Thank you for choosing [facility]  for your care. Our goal is always to provide you with excellent care. Hearing back from our patients is one way we can continue to improve our services. Please take a few minutes to complete the written survey that you may receive in the mail after your visit with us. Thank you!             Your Updated Medication List - Protect others around you: Learn how to safely use, store and throw away your medicines at www.disposemymeds.org.           This list is accurate as of 5/3/18  1:39 PM.  Always use your most recent med list.                   Brand Name Dispense Instructions for use Diagnosis    acetaminophen 500 MG tablet    TYLENOL     Take 500-1,000 mg by mouth every 6 hours as needed for mild pain        * albuterol 108 (90 Base) MCG/ACT Inhaler    PROAIR HFA/PROVENTIL HFA/VENTOLIN HFA    3 Inhaler    Inhale 2 puffs into the lungs every 6 hours as needed for shortness of breath / dyspnea or wheezing    Mild persistent asthma without complication       * albuterol (2.5 MG/3ML) 0.083% neb solution     1 Box    Take 1 vial (2.5 mg) by nebulization every 6 hours as needed for shortness of breath / dyspnea or wheezing    Mild persistent asthma without complication       allopurinol 100 MG tablet    ZYLOPRIM    90 tablet    TAKE ONE TABLET BY MOUTH ONE TIME DAILY    Acute gouty arthritis       ARIPiprazole 5 MG tablet    ABILIFY    180 tablet    Take 1.5 tablets (7.5 mg) by mouth At Bedtime    Major depressive disorder, recurrent episode, moderate (H)       buPROPion 150 MG 24 hr tablet    WELLBUTRIN XL    90 tablet    TAKE  ONE TABLET BY MOUTH EVERY DAY IN THE MORNING    COPD exacerbation (H)       calcium citrate-vitamin D 315-250 MG-UNIT Tabs per tablet    CITRACAL     Take 2 tablets by mouth daily        EPINEPHrine 0.3 MG/0.3ML injection 2-pack    EPIPEN/ADRENACLICK/or ANY BX GENERIC EQUIV    0.6 mL    Inject 0.3 mLs (0.3 mg) into the muscle once as needed for anaphylaxis    Anaphylactic reaction to bee sting, undetermined intent, subsequent encounter       fluticasone 50 MCG/ACT spray    FLONASE    1 Bottle    Spray 1-2 sprays into both nostrils daily    Nasal congestion       furosemide 20 MG tablet    LASIX    180 tablet    Take 2 tablets (40 mg) by mouth 2 times daily    Lymphedema of both lower extremities       gabapentin 600 MG tablet    NEURONTIN    90 tablet    TAKE ONE TABLET BY MOUTH THREE TIMES DAILY    Polyneuropathy in other diseases  "classified elsewhere (H)       ipratropium - albuterol 0.5 mg/2.5 mg/3 mL 0.5-2.5 (3) MG/3ML neb solution    DUONEB    30 vial    Take 1 vial (3 mLs) by nebulization every 6 hours as needed for shortness of breath / dyspnea or wheezing    Chronic obstructive pulmonary disease, unspecified COPD type (H)       lamoTRIgine 200 MG tablet    LaMICtal    90 tablet    Take 1 tablet (200 mg) by mouth daily    Major depressive disorder, recurrent episode, moderate (H)       levothyroxine 150 MCG tablet    SYNTHROID/LEVOTHROID    90 tablet    TAKE ONE TABLET BY MOUTH DAILY    Hypothyroidism, unspecified type       metoclopramide 10 MG tablet    REGLAN    40 tablet    Take 1 tablet (10 mg) by mouth 4 times daily as needed    Flatulence, eructation, and gas pain       mometasone-formoterol 200-5 MCG/ACT oral inhaler    DULERA    13 g    Inhale 2 puffs into the lungs 2 times daily    COPD exacerbation (H), Chronic obstructive pulmonary disease, unspecified COPD type (H)       omeprazole 20 MG CR capsule    priLOSEC    180 capsule    TAKE ONE CAPSULE BY MOUTH TWICE DAILY    Gastroesophageal reflux disease without esophagitis       ondansetron 4 MG tablet    ZOFRAN    10 tablet    Take 1 tablet (4 mg) by mouth every 8 hours as needed for nausea    Nausea and vomiting, intractability of vomiting not specified, unspecified vomiting type       * order for DME      Equipment being ordered: CPAP AIRSENSE 10 5-18 CM H20 # [id number]   DN# [id number]        * order for DME     1 Month    Equipment being ordered: INCONTINENCE PADS  QID    Other urinary incontinence       * order for DME     60 Month    Equipment being ordered: DEPENDS SIZE LARGE    Mixed incontinence       * order for DME     2 Units    Equipment being ordered: [id number]\" x2pair   \"20-30mmHg [employer] Hybrid Liners\" x 2 pair    Peripheral edema       order for DME     1 Device    Equipment being ordered: Nebulizer    COPD exacerbation (H), Chronic obstructive pulmonary " disease, unspecified COPD type (H)       order for DME     1 each    Equipment being ordered:x2 [employer] 30/40mmHg compression wraps with x2 extra prs of compression liners    Secondary lymphedema       simvastatin 20 MG tablet    ZOCOR    30 tablet    TAKE ONE TABLET BY MOUTH NIGHTLY AT BEDTIME    Hypercholesteremia       traZODone 150 MG tablet    DESYREL    30 tablet    TAKE ONE TABLET BY MOUTH NIGHTLY AT BEDTIME    Major depressive disorder, recurrent episode, moderate (H)       warfarin 5 MG tablet    COUMADIN    180 tablet    Take 12.5 mg on Wed and Sat 10 mg all other days or as directed by the Anticoagulation Clinic    DVT (deep venous thrombosis) (H)       * Notice:  This list has 6 medication(s) that are the same as other medications prescribed for you. Read the directions carefully, and ask your doctor or other care provider to review them with you.

## 2018-05-03 NOTE — PROGRESS NOTES
CARDIOLOGY CONSULT    REASON FOR CONSULT: chest pain, palpitations    PRIMARY CARE PHYSICIAN:  Kd Leong    HISTORY OF PRESENT ILLNESS:  52-year-old female with no cardiac history is seen for chest tightness and palpitations.    She has a history of DVT, secondary polycythemia, fatty liver, sleep apnea, tobacco abuse, and COPD.     She reports a lot of family issues of heart disease.  Her father had coronary disease at an older age, mother had heart failure, etiology unknown.  She has 3 sisters with heart attacks, she thinks they were in the 40s and 50s.    Patient will do only light activity.  She had significant lymphedema which limits her walking.  She will use a cane and walks at a slow pace.  She denies any exertional chest pain.  She has some mild dyspnea at baseline.    In the past few weeks she has had some random episodes of chest pain or tightness.  This can occur up to 4 times per day.  She also notices some palpitations lasting intermittently throughout the day.  She feels a skipping sensation, but no sustained racing heartbeat.    She states her blood pressure is up and down, but generally fairly well-controlled.    Echo March 2018 showed EF 60%, mild LVH, normal RV, no valve disease.     PAST MEDICAL HISTORY:  Past Medical History:   Diagnosis Date     Acromioclavicular joint arthritis 1/25/2017     Acute bronchospasm 8/15/2016     Acute gouty arthritis 8/4/2014     Anxiety state, unspecified      Bipolar I disorder, most recent episode (or current) unspecified      Closed fracture of metatarsal bone 6/5/2007     Depressive disorder, not elsewhere classified      DVT, lower extremity (H)      Headache 10/17/2014     Impingement syndrome of shoulder region 1/25/2017     Polycythemia, secondary     [place] HGB      Right shoulder pain 10/14/2014     Trochanteric bursitis of both hips 1/11/2016       MEDICATIONS:  Current Outpatient Prescriptions   Medication     acetaminophen (TYLENOL) 500 MG  tablet     albuterol (2.5 MG/3ML) 0.083% neb solution     albuterol (PROAIR HFA/PROVENTIL HFA/VENTOLIN HFA) 108 (90 BASE) MCG/ACT Inhaler     allopurinol (ZYLOPRIM) 100 MG tablet     ARIPiprazole (ABILIFY) 5 MG tablet     buPROPion (WELLBUTRIN XL) 150 MG 24 hr tablet     calcium citrate-vitamin D (CITRACAL) 315-250 MG-UNIT TABS per tablet     EPINEPHrine (EPIPEN/ADRENACLICK/OR ANY BX GENERIC EQUIV) 0.3 MG/0.3ML injection 2-pack     fluticasone (FLONASE) 50 MCG/ACT spray     furosemide (LASIX) 20 MG tablet     gabapentin (NEURONTIN) 600 MG tablet     ipratropium - albuterol 0.5 mg/2.5 mg/3 mL (DUONEB) 0.5-2.5 (3) MG/3ML neb solution     lamoTRIgine (LAMICTAL) 200 MG tablet     levothyroxine (SYNTHROID/LEVOTHROID) 150 MCG tablet     metoclopramide (REGLAN) 10 MG tablet     mometasone-formoterol (DULERA) 200-5 MCG/ACT oral inhaler     omeprazole (PRILOSEC) 20 MG CR capsule     ondansetron (ZOFRAN) 4 MG tablet     order for DME     order for DME     order for DME     order for DME     order for DME     order for DME     simvastatin (ZOCOR) 20 MG tablet     traZODone (DESYREL) 150 MG tablet     warfarin (COUMADIN) 5 MG tablet     No current facility-administered medications for this visit.        ALLERGIES:  Allergies   Allergen Reactions     Darvocet [Propoxyphene N-Apap] Anaphylaxis     Darvocet,Percocet      Percocet [Oxycodone-Acetaminophen] Anaphylaxis, Hives and Swelling     Has tolerated hydromorphone in the past.      Asa [Aspirin] Hives     Aspirin causes seizures and hives, throat swelling.   Has tolerated ketorolac in the past.        Bee      Ibuprofen Swelling     Throat swelling per patient      Lyrica [Pregabalin] Other (See Comments) and Swelling     dizziness     Povidone Iodine Rash     Reaction to topical betadine     Tape [Adhesive Tape] Rash       SOCIAL HISTORY:  I have reviewed this patient's social history and updated it with pertinent information if needed. Bia Bill  reports that she has  been smoking Cigarettes.  She has a 17.00 pack-year smoking history. She has never used smokeless tobacco. She reports that she does not drink alcohol or use illicit drugs.    FAMILY HISTORY:  I have reviewed this patient's family history and updated it with pertinent information if needed.   Family History   Problem Relation Age of Onset     Hypertension Mother      DIABETES Mother      Blood Disease Mother      HEART DISEASE Mother      chf     CEREBROVASCULAR DISEASE Mother      Hypertension Father      CANCER Father      lung cancer     Allergies Sister      Depression Sister      Hypertension Sister        REVIEW OF SYSTEMS:  Constitutional:  No weight loss, fever, chills, weakness or fatigue.  HEENT:  Eyes:  No visual loss, blurred vision, double vision or yellow sclerae. No hearing loss, sneezing, congestion, runny nose or sore throat.  Skin:  No rash or itching.  Cardiovascular: per HPI  Respiratory: per HPI  GI:  No anorexia, nausea, vomiting or diarrhea. No abdominal pain or blood.  :  No dysurea, hematuria  Neurologic:  No headache, dizziness, syncope, paralysis, ataxia, numbness or tingling in the extremities. No change in bowel or bladder control.  Musculoskeletal:  No muscle, back pain, joint pain or stiffness.  Hematologic:  No anemia, bleeding or bruising.  Lymphatics:  No enlarged nodes. No history of splenectomy.  Psychiatric:  No history of depression or anxiety.  Endocrine:  No reports of sweating, cold or heat intolerance. No polyuria or polydipsia.  Allergies:  No history of asthma, hives, eczema or rhinitis.    PHYSICAL EXAM:      BP: 145/71 Pulse: 76     SpO2: 94 %      Vital Signs with Ranges  Temp:  [98.1  F (36.7  C)] 98.1  F (36.7  C)  Pulse:  [76-84] 76  BP: (130-145)/(63-71) 145/71  SpO2:  [92 %-94 %] 94 %  231 lbs 12.8 oz    Constitutional: awake, alert, no distress  Eyes: PERRL, sclera nonicteric  ENT: trachea midline  Respiratory: lungs clear  Cardiovascular: distant sounds, RRR,  no murmurs  GI: nondistended, nontender, bowel sounds present  Lymph/Hematologic: no lymphadenopathy  Skin: dry, no rash  Musculoskeletal: good muscle tone, strength 5/5 in upper and lower extremities  Neurologic: no focal deficits  Neuropsychiatric: appropriate affact    DATA:  Labs:   April 2018: Cholesterol 187, triglycerides 198, HDL 46,    March 2018: NT proBNP 12    EKG, March 22, 2018: Sinus rhythm, rate 70, normal axis and intervals     ASSESSMENT:  52-year-old female seen for chest tightness and palpitations.  Her symptoms are little more atypical for angina, however she has a fairly strong risk factor profile, especially with her tobacco abuse and family history.  Lexiscan nuclear stress test will be ordered.  With her high BMI, expected image quality may not be great, but we mainly want to rule out more significant areas of ischemia.    A 3 day Zile patch will be ordered for her palpitations.  She could have some symptomatic ectopy, there is a lower suspicion for any sustained arrhythmias.    Her lower extremity edema likely is lymphedema.  Her echo essentially was normal in March and her BNP was only 12, making a cardiac component to her edema very unlikely.    RECOMMENDATIONS:  1.  Chest tightness, lower suspicion for obstructive coronary disease  -Lexiscan nuclear stress     2.  Help rotations  - 3 day Zio patch    Follow-up as needed.    Rajinder Bose MD  Cardiology - [facility]  Pager:  [phone]  Text Page  May 3, 2018

## 2018-05-03 NOTE — PROGRESS NOTES
"          ANTICOAGULATION FOLLOW-UP CLINIC VISIT    Patient Name:  Bia Bill  Date:  5/3/2018  Contact Type:  Telephone    SUBJECTIVE:     Patient Findings     Positives Inflammation (leg swelling)    Comments Patient had 75mg in the previous 7 days, will decrease dose to 72.5mg by the next INR check in ~2 weeks (~3.3% decrease) to reach mid-range goal. Patient is no longer on any antibiotics or steroids. No issues with bleeding or unusual bruising noted.    Patient saw PCP yesterday for edema - per EPIC notes, \"Modest gains in lower legs but fluid gained in upper legs and abdomin.  Will work on sodium intake,  Will increase diuretic for short term at least to 40 mg twice daily.  Recheck in 2 weeks.,  F/u lymphedema clinic\"    Patient saw cardiology today, per [facility], \"1. Chest tightness, lower suspicion for obstructive coronary disease -Lexiscan nuclear stress  2.  Help rotations - 3 day Zio patch. Follow-up as needed.\"            OBJECTIVE    INR   Date Value Ref Range Status   05/03/2018 2.73 (H) 0.86 - 1.14 Final       ASSESSMENT / PLAN  INR assessment SUPRA    Recheck INR In: 2 WEEKS    INR Location Outside lab [facility]     Anticoagulation Summary as of 5/3/2018     INR goal 2.0-2.5   Today's INR 2.73!   Maintenance plan 12.5 mg (5 mg x 2.5) on Wed; 10 mg (5 mg x 2) all other days   Full instructions 12.5 mg on Wed; 10 mg all other days   Weekly total 72.5 mg   Plan last modified Angelina Nj RN (5/3/2018)   Next INR check 5/21/2018   Priority INR   Target end date Indefinite    Indications   Long term current use of anticoagulant therapy [Z79.01]  DVT (deep venous thrombosis) (H) [I82.409]  PVD (peripheral vascular disease) with claudication (H) [I73.9]         Anticoagulation Episode Summary     INR check location     Preferred lab     Send INR reminders to [place] PHONE ANTICOAG POOL    Comments * lab draw due to elevated hematocrit (fingerstick meters don't work). LEFT LE. STENT x 3 LEFT UPPER " LEG. Previous arterial clot. New goal range 2-2.5 starting 11/6/17.      Anticoagulation Care Providers     Provider Role Specialty Phone number    Kd Leong MD [facility] [phone]            See the Encounter Report to view Anticoagulation Flowsheet and Dosing Calendar (Go to Encounters tab in chart review, and find the Anticoagulation Therapy Visit)        Angelina Nj RN, CACP

## 2018-05-03 NOTE — PROGRESS NOTES
AUDIOLOGY REPORT    SUBJECTIVE: Bia Bill is a 52 year old female was seen in the Audiology Clinic at  [facility] on 5/03/18 to discuss concerns with hearing and functional communication difficulties. The patient was accompanied by their self. Bia has been seen previously on 4/17/2018, and results revealed a mild sensorineural hearing loss in the right ear with a mild to moderate conductive hearing loss in the left ear.  The patient was medically evaluated and determined to be cleared for  by Dr. Hardy. Bia notes difficulty with communication in a variety of listening situations.Patient is an experienced hearing aid user.    OBJECTIVE:  Patient is a hearing aid candidate. Patient would like to move forward with a hearing aid evaluation today. Therefore, the patient was presented with different options for amplification to help aid in communication. Discussed styles, levels of technology and monaural vs. binaural fitting.     The hearing aid(s) mutually chosen were:  Binaural: Phonak Audeo B50-R  COLOR: P4  Gee  BATTERY SIZE: Rechargable  CANAL/ LENGTH: 1xS bilaterally    Otoscopy revealed ears are clear of cerumen bilaterally    ASSESSMENT:   No diagnosis found.    Reviewed purchase information and warranty information with patient. The 45 day trial period was explained to patient. The patient was given a copy of the [place] Department of Health consumer brochure on purchasing hearing instruments. Patient risk factors have been provided to the patient in writing prior to the sale of the hearing aid per FDA regulation. The risk factors are also available in the User Instructional Booklet to be presented on the day of the hearing aid fitting. Hearing aid(s) ordered. Hearing aid evaluation completed.    PLAN: Bia is scheduled to return in 2-3 weeks for a hearing aid fitting and programming. Purchase agreement will be completed on that date. Please  contact this clinic with any questions or concerns.      Rhiannon Fong M.A. -[facility], #[id number]

## 2018-05-04 ENCOUNTER — HOSPITAL ENCOUNTER (OUTPATIENT)
Dept: CARDIOLOGY | Facility: CLINIC | Age: 53
Discharge: HOME | End: 2018-05-04
Attending: INTERNAL MEDICINE | Admitting: INTERNAL MEDICINE
Payer: COMMERCIAL

## 2018-05-04 ENCOUNTER — HOSPITAL ENCOUNTER (OUTPATIENT)
Dept: PHYSICAL THERAPY | Facility: CLINIC | Age: 53
Setting detail: THERAPIES SERIES
End: 2018-05-04
Attending: FAMILY MEDICINE
Payer: COMMERCIAL

## 2018-05-04 DIAGNOSIS — R00.2 PALPITATIONS: ICD-10-CM

## 2018-05-04 PROCEDURE — 0298T ZZC EXT ECG > 48HR TO 21 DAY REVIEW AND INTERPRETATN: CPT | Performed by: INTERNAL MEDICINE

## 2018-05-04 PROCEDURE — 97140 MANUAL THERAPY 1/> REGIONS: CPT | Mod: GP | Performed by: REHABILITATION PRACTITIONER

## 2018-05-04 PROCEDURE — 0296T ZIO PATCH HOLTER: CPT

## 2018-05-04 PROCEDURE — 40000099 ZZH STATISTIC LYMPHEDEMA VISIT: Performed by: REHABILITATION PRACTITIONER

## 2018-05-04 ASSESSMENT — ANXIETY QUESTIONNAIRES
4. TROUBLE RELAXING: MORE THAN HALF THE DAYS
GAD7 TOTAL SCORE: 10
7. FEELING AFRAID AS IF SOMETHING AWFUL MIGHT HAPPEN: SEVERAL DAYS
3. WORRYING TOO MUCH ABOUT DIFFERENT THINGS: SEVERAL DAYS
5. BEING SO RESTLESS THAT IT IS HARD TO SIT STILL: SEVERAL DAYS
2. NOT BEING ABLE TO STOP OR CONTROL WORRYING: SEVERAL DAYS
6. BECOMING EASILY ANNOYED OR IRRITABLE: MORE THAN HALF THE DAYS
1. FEELING NERVOUS, ANXIOUS, OR ON EDGE: MORE THAN HALF THE DAYS

## 2018-05-05 ASSESSMENT — PATIENT HEALTH QUESTIONNAIRE - PHQ9: SUM OF ALL RESPONSES TO PHQ QUESTIONS 1-9: 9

## 2018-05-05 ASSESSMENT — ANXIETY QUESTIONNAIRES: GAD7 TOTAL SCORE: 10

## 2018-05-05 NOTE — PROGRESS NOTES
Progress Note  Disclaimer: This note consists of symbols derived from keyboarding, dictation and/or voice recognition software. As a result, there may be errors in the script that have gone undetected. Please consider this when interpreting information found in this chart.    Client Name: Bia Bill  Date: 5/3/2018         Service Type: Individual      Session Start Time: 9:30 AM  Session End Time: 10:15 AM      Session Length: 45     Session #: 30     Attendees: Client attended alone    Treatment Plan Last Reviewed: 4/3/2018  PHQ-9 / CHIN-7 : See flowsheet     DATA   Client reports she has been having significant medical problems, leg swelling, cannot wear shoes anymore, legs are swollen from knees down and her depression is spiking as a result.  Finds that she cannot do very much physically, cannot go outside of it range.  On again off again relationship with her daughter Center around daughter's desire to get client in to assisted living.  Client reports she is still falling a great deal.   Progress Since Last Session (Related to Symptoms / Goals / Homework):   Symptoms: Stable    Homework: Achieved / completed to satisfaction      Episode of Care Goals: Satisfactory progress - ACTION (Actively working towards change); Intervened by reinforcing change plan / affirming steps taken     Current / Ongoing Stressors and Concerns:   Recurrent depression  family relational problems, medical problems, chronic pain, trauma history      Treatment Objective(s) Addressed in This Session:   Increase interest, engagement, and pleasure in doing things  Decrease frequency and intensity of feeling down, depressed, hopeless       Intervention:   CBT: Behavioral activation reviewed grounding techniques  ASSESSMENT: Current Emotional / Mental Status (status of significant symptoms):   Risk status (Self / Other harm or suicidal ideation)   Client denies current fears or concerns for  personal safety.   Client denies current or recent suicidal ideation or behaviors.   Client denies current or recent homicidal ideation or behaviors.   Client denies current or recent self injurious behavior or ideation.   Client denies other safety concerns.   A safety and risk management plan has not been developed at this time, however client was given the after-hours number / 911 should there be a change in any of these risk factors.     Appearance:   Appropriate    Eye Contact:   Good    Psychomotor Behavior: Normal    Attitude:   Cooperative    Orientation:   All   Speech    Rate / Production: Normal     Volume:  Normal    Mood:    Normal   Affect:    Appropriate    Thought Content:  Clear    Thought Form:  Coherent  Logical    Insight:    Good      Medication Review:   No changes to current psychiatric medication(s)     Medication Compliance:   Yes     Changes in Health Issues:   None reported     Chemical Use Review:   Substance Use: Chemical use reviewed, no active concerns identified      Tobacco Use: Client reports no smoking for the last week     Collateral Reports Completed:   Not Applicable    PLAN: (Client Tasks / Therapist Tasks / Other)  Client to continue With her self-care routine and maintenance of boundaries.   Maintain medication compliance and contact with psychiatry.  Take care of herself physically.  Practice one to 2 grounding techniques each day.   Kd Morris                                                         ________________________________________________________________________    Treatment Plan    Client's Name: Bia Bill  YOB: 1965    Date: 10/24/2017      DSM5 Diagnoses: (Sustained by DSM5 Criteria Listed Above)  Diagnoses: 296.40 Bipolar I Disorder Current or Most Recent Episode Manic, Unspecified  Psychosocial & Contextual Factors: financial difficulties, chronic pain, roommate issues  WHODAS 2.0 (12 item)               This questionnaire asks about  difficulties due to health conditions. Health conditions             include disease or illnesses, other health problems that may be short or long lasting,             injuries, mental health or emotional problems, and problems with alcohol or drugs.                         Think back over the past 30 days and answer these questions, thinking about how much             difficulty you had doing the following activities. For each question, please [place] only             one response.      S1  Standing for long periods such as 30 minutes?  Mild =           2    S2  Taking care of household responsibilities?  Moderate =   3    S3  Learning a new task, for example, learning how to get to a new place?  Mild =           2    S4  How much of a problem do you have joining community activities (for example, festivals, Baptism or other activities) in the same way as anyone else can?  Moderate =   3    S5  How much have you been emotionally affected by your health problems?  Mild =           2        In the past 30 days, how much difficulty did you have in:    S6  Concentrating on doing something for ten minutes?  Mild =           2    S7  Walking a long distance such as a kilometer (or equivalent)?  Severe =       4    S8  Washing your whole body?  None =         1    S9  Getting dressed?  None =         1    S10  Dealing with people you do not know?  Moderate =   3    S11  Maintaining a friendship?  Severe =       4    S12  Your day to day work?  Moderate =   3       H1  Overall, in the past 30 days, how many days were these difficulties present?  Record number of days seven    H2  In the past 30 days, for how many days were you totally unable to carry out your usual activities or work because of any health condition?  Record number of days  seven    H3  In the past 30 days, not counting the days that you were totally unable, for how many days did you cut back or reduce your usual activities or work because of any health  condition?  Record number of days five                   Referral / Collaboration:  Referral to another professional/service is not indicated at this time..    Anticipated number of session or this episode of care: 15    Goals  1. Education- the Biopsychosocial model of depression  a. Client will be able to describe how depression is effecting them physically, emotionally and socially  2. Behavioral Activation  a. Client will learn to assess their depression on a day to day basis  b. Client will Identify two forms of exercise/activity and engage in them 3 times per week  c. Client will Identify 3 things that make them laugh, and engage in these 5 times per week.  d. Client will Identify 1-2Creative activities or hobbies  and engage in them 2 times per week  e. Client will identify music, movies, books that make them feel good and use them 3-4 times per week  3. Self-care  a. Client will identify 5 things they can do just for themselves  b. Client will take time for quiet, reflection, meditation 5 times per week  c. Client will Learn to set boundaries when appropriate  d. Client will Identify 2 individuals they can call on for support, distraction  4. Assessment of progress  a. Client will engage in assessment of their progress on a regular basis    Bipolar Disorder  Treatment plan:  1. Education- the Biopsychosocial model of Bipolar Disorder    a. Client will be able to describe in general terms what Bipolar Disorder  is and is not  b. Client will be able to describe how Bipolar Disorder  has affected their life in at least two different areas, such as school or work and Home/relationships  c. Clients parents/guardians or significant others will be provided information on what Bipolar Disorder  is and the ways it can affect relationships and be encouraged to be a part of clients treatment team.  2. Medication  a. Client will participate in medication evaluation for Bipolar Disorder  symptoms and follow medication  recommendations.   3. Identification and management of triggers  a. Client and therapist will examine patient s history to determine if there are predictable triggers for manic or depressive episodes (e.g. boredom, anger, family stress)  b. Client and therapist will develop means of diffusing these triggers (e.g. relaxation strategies, boundary setting, anger management)  4. Comorbid conditions   a. Client and therapist will asses for comorbid conditions (e.g. anxiety, depression, substance use). and add additional items to treatment plan as needed  5. Self-care  a. Client will identify 3 things they can do just for themselves  b. Client will take time for quiet, reflection, meditation 3 times per week  c. Client will Learn to set boundaries when appropriate  d. Client will Identify 2 individuals they can call on for support, distraction  5. Behavioral Activation  a. Client will Identify two forms of exercise/activity and engage in them 3 times per week  b. Client will Identify 2 things that make them laugh, and engage in these 3 times per week.  c. Client will Identify 2 Creative activities or hobbies  and engage in them 2 times per week  d. Client will identify music, movies, books that make them feel good and use them 3 times per week  6. Assessment of progress  a. Client will engage in assessment of their progress on a regular basis    Client has reviewed and agreed to the above plan.      Kd Morris  4/3/2018

## 2018-05-07 ENCOUNTER — HOSPITAL ENCOUNTER (OUTPATIENT)
Dept: PHYSICAL THERAPY | Facility: CLINIC | Age: 53
Setting detail: THERAPIES SERIES
End: 2018-05-07
Attending: FAMILY MEDICINE
Payer: COMMERCIAL

## 2018-05-07 DIAGNOSIS — E78.00 HYPERCHOLESTEREMIA: ICD-10-CM

## 2018-05-07 PROCEDURE — 40000099 ZZH STATISTIC LYMPHEDEMA VISIT: Performed by: REHABILITATION PRACTITIONER

## 2018-05-07 PROCEDURE — 97140 MANUAL THERAPY 1/> REGIONS: CPT | Mod: GP | Performed by: REHABILITATION PRACTITIONER

## 2018-05-07 NOTE — TELEPHONE ENCOUNTER
"SIMVASTATIN 20 MG TAB ACCO        Last Written Prescription Date:  4/6/18  Last Fill Quantity: 30,   # refills: 0  Last Office Visit: 5/2/18  Future Office visit:    Next 5 appointments (look out 90 days)     May 10, 2018  9:00 AM CDT   Return Visit with Hannah Middleton   [facility] ([facility]    [address] [place] [address]   [phone]            May 16, 2018 10:00 AM CDT   SHORT with Kd Leong MD   [facility])    [address]  [facility] [address]   [phone]            May 21, 2018  9:30 AM CDT   Return Visit with Kd Morris   [facility] ([facility])    [address] [address]   [phone]            Jun 08, 2018  9:30 AM CDT   Return Visit with Kd Morris   [facility] ([facility])    [address] [address]   [phone]                   Requested Prescriptions   Pending Prescriptions Disp Refills     simvastatin (ZOCOR) 20 MG tablet [Pharmacy Med Name: SIMVASTATIN 20 MG   TAB ACCO] 30 tablet 0     Sig: TAKE ONE TABLET BY MOUTH AT BEDTIME    Statins Protocol Passed    5/7/2018  8:47 AM       Passed - LDL on file in past 12 months    Recent Labs   Lab Test  04/06/18   1316   LDL  101*            Passed - No abnormal creatine kinase in past 12 months    Recent Labs   Lab Test  11/30/15   1508   CKT  102               Passed - Recent (12 mo) or future (30 days) visit within the authorizing provider's specialty    Patient had office visit in the last 12 months or has a visit in the next 30 days with authorizing provider or within the authorizing provider's specialty.  See \"Patient Info\" tab in inbasket, or \"Choose Columns\" in Meds & Orders section of the refill encounter.           Passed - Patient is age 18 or older       Passed - No active pregnancy on record       Passed - No positive pregnancy test " in past 12 months

## 2018-05-08 ENCOUNTER — HOSPITAL ENCOUNTER (OUTPATIENT)
Dept: PHYSICAL THERAPY | Facility: CLINIC | Age: 53
Setting detail: THERAPIES SERIES
End: 2018-05-08
Attending: FAMILY MEDICINE
Payer: COMMERCIAL

## 2018-05-08 PROCEDURE — 97140 MANUAL THERAPY 1/> REGIONS: CPT | Mod: GP | Performed by: PHYSICAL THERAPIST

## 2018-05-08 PROCEDURE — 40000099 ZZH STATISTIC LYMPHEDEMA VISIT: Performed by: PHYSICAL THERAPIST

## 2018-05-09 ENCOUNTER — HOSPITAL ENCOUNTER (OUTPATIENT)
Dept: NUCLEAR MEDICINE | Facility: CLINIC | Age: 53
Setting detail: NUCLEAR MEDICINE
Discharge: HOME | End: 2018-05-09
Attending: INTERNAL MEDICINE | Admitting: INTERNAL MEDICINE
Payer: COMMERCIAL

## 2018-05-09 ENCOUNTER — HOSPITAL ENCOUNTER (OUTPATIENT)
Dept: PHYSICAL THERAPY | Facility: CLINIC | Age: 53
Setting detail: THERAPIES SERIES
End: 2018-05-09
Attending: FAMILY MEDICINE
Payer: COMMERCIAL

## 2018-05-09 DIAGNOSIS — R07.89 CHEST TIGHTNESS: ICD-10-CM

## 2018-05-09 PROCEDURE — 93018 CV STRESS TEST I&R ONLY: CPT | Performed by: FAMILY MEDICINE

## 2018-05-09 PROCEDURE — 97140 MANUAL THERAPY 1/> REGIONS: CPT | Mod: GP | Performed by: REHABILITATION PRACTITIONER

## 2018-05-09 PROCEDURE — 93017 CV STRESS TEST TRACING ONLY: CPT

## 2018-05-09 PROCEDURE — 25000128 H RX IP 250 OP 636: Performed by: INTERNAL MEDICINE

## 2018-05-09 PROCEDURE — 40000099 ZZH STATISTIC LYMPHEDEMA VISIT: Performed by: REHABILITATION PRACTITIONER

## 2018-05-09 PROCEDURE — 78452 HT MUSCLE IMAGE SPECT MULT: CPT | Performed by: INTERNAL MEDICINE

## 2018-05-09 PROCEDURE — A9502 TC99M TETROFOSMIN: HCPCS | Performed by: FAMILY MEDICINE

## 2018-05-09 PROCEDURE — 34300033 ZZH RX 343: Performed by: FAMILY MEDICINE

## 2018-05-09 PROCEDURE — 78452 HT MUSCLE IMAGE SPECT MULT: CPT

## 2018-05-09 PROCEDURE — 93016 CV STRESS TEST SUPVJ ONLY: CPT | Performed by: FAMILY MEDICINE

## 2018-05-09 RX ORDER — SIMVASTATIN 20 MG
TABLET ORAL
Qty: 30 TABLET | Refills: 0 | Status: SHIPPED | OUTPATIENT
Start: 2018-05-09 | End: 2018-05-31

## 2018-05-09 RX ORDER — REGADENOSON 0.08 MG/ML
0.4 INJECTION, SOLUTION INTRAVENOUS ONCE
Status: COMPLETED | OUTPATIENT
Start: 2018-05-09 | End: 2018-05-09

## 2018-05-09 RX ADMIN — REGADENOSON 0.4 MG: 0.08 INJECTION, SOLUTION INTRAVENOUS at 14:50

## 2018-05-09 RX ADMIN — TETROFOSMIN 10.4 MCI.: 1.38 INJECTION, POWDER, LYOPHILIZED, FOR SOLUTION INTRAVENOUS at 13:00

## 2018-05-09 RX ADMIN — TETROFOSMIN 33 MCI.: 1.38 INJECTION, POWDER, LYOPHILIZED, FOR SOLUTION INTRAVENOUS at 14:50

## 2018-05-09 NOTE — TELEPHONE ENCOUNTER
Medication is being filled for 1 time refill only due to:  Patient needs to be seen because it has been more than one year since last visit.PAtient has 5/16/18 appointment.  Mundo Conde RN

## 2018-05-14 NOTE — MR AVS SNAPSHOT
Bia Bill   10/26/2017   Anticoagulation Therapy Visit    Description:  51 year old female   Provider:  Angelina jN, RN   Department:  [facility]           INR as of 10/26/2017         The patient was not given dosing instructions in this encounter.      Anticoagulation Summary as of 10/26/2017         The patient was not given dosing instructions in this encounter.      October 2017 Details    Sun Mon Tue Wed Thu Fri Sat     1               2               3               4               5               6               7                 8               9               10               11               12               13               14                 15               16               17               18               19               20               21                 22               23               24      Hold   See details      25            26               27               28                 29               30               31                    Date Details   10/24 This INR check       Date of next INR:  10/25/2017         How to take your warfarin dose     To take:  10 mg Take 2 of the 5 mg tablets.    Hold Do not take your warfarin dose. See the Details table to the right for additional instructions.                 14-May-2018 21:25

## 2018-05-15 ENCOUNTER — HOSPITAL ENCOUNTER (OUTPATIENT)
Dept: PHYSICAL THERAPY | Facility: CLINIC | Age: 53
Setting detail: THERAPIES SERIES
End: 2018-05-15
Attending: FAMILY MEDICINE
Payer: COMMERCIAL

## 2018-05-15 DIAGNOSIS — G63 POLYNEUROPATHY IN OTHER DISEASES CLASSIFIED ELSEWHERE (H): Chronic | ICD-10-CM

## 2018-05-15 PROCEDURE — 40000099 ZZH STATISTIC LYMPHEDEMA VISIT: Performed by: REHABILITATION PRACTITIONER

## 2018-05-15 PROCEDURE — 97140 MANUAL THERAPY 1/> REGIONS: CPT | Mod: GP | Performed by: REHABILITATION PRACTITIONER

## 2018-05-15 NOTE — TELEPHONE ENCOUNTER
Routing refill request to provider for review/approval because:  Please advise at patient's appointment on 5/16/18. Thank you.  Mundo Conde RN

## 2018-05-16 ENCOUNTER — OFFICE VISIT (OUTPATIENT)
Dept: FAMILY MEDICINE | Facility: CLINIC | Age: 53
End: 2018-05-16
Payer: COMMERCIAL

## 2018-05-16 VITALS
RESPIRATION RATE: 20 BRPM | HEART RATE: 76 BPM | TEMPERATURE: 98.3 F | WEIGHT: 232.1 LBS | DIASTOLIC BLOOD PRESSURE: 70 MMHG | SYSTOLIC BLOOD PRESSURE: 124 MMHG | BODY MASS INDEX: 46.88 KG/M2

## 2018-05-16 DIAGNOSIS — R14.2 FLATULENCE, ERUCTATION, AND GAS PAIN: ICD-10-CM

## 2018-05-16 DIAGNOSIS — Z79.01 CURRENT USE OF LONG TERM ANTICOAGULATION: Primary | Chronic | ICD-10-CM

## 2018-05-16 DIAGNOSIS — G63 POLYNEUROPATHY IN OTHER DISEASES CLASSIFIED ELSEWHERE (H): Chronic | ICD-10-CM

## 2018-05-16 DIAGNOSIS — J44.9 CHRONIC OBSTRUCTIVE PULMONARY DISEASE, UNSPECIFIED COPD TYPE (H): Chronic | ICD-10-CM

## 2018-05-16 DIAGNOSIS — R14.1 FLATULENCE, ERUCTATION, AND GAS PAIN: ICD-10-CM

## 2018-05-16 DIAGNOSIS — R14.3 FLATULENCE, ERUCTATION, AND GAS PAIN: ICD-10-CM

## 2018-05-16 DIAGNOSIS — I89.0 LYMPHEDEMA: ICD-10-CM

## 2018-05-16 DIAGNOSIS — E03.9 HYPOTHYROIDISM, UNSPECIFIED TYPE: ICD-10-CM

## 2018-05-16 LAB
ALBUMIN SERPL-MCNC: 3.9 G/DL (ref 3.4–5)
ALP SERPL-CCNC: 142 U/L (ref 40–150)
ALT SERPL W P-5'-P-CCNC: 34 U/L (ref 0–50)
ANION GAP SERPL CALCULATED.3IONS-SCNC: 6 MMOL/L (ref 3–14)
AST SERPL W P-5'-P-CCNC: 23 U/L (ref 0–45)
BILIRUB SERPL-MCNC: 0.5 MG/DL (ref 0.2–1.3)
BUN SERPL-MCNC: 10 MG/DL (ref 7–30)
CALCIUM SERPL-MCNC: 9 MG/DL (ref 8.5–10.1)
CHLORIDE SERPL-SCNC: 102 MMOL/L (ref 94–109)
CO2 SERPL-SCNC: 31 MMOL/L (ref 20–32)
CREAT SERPL-MCNC: 0.69 MG/DL (ref 0.52–1.04)
GFR SERPL CREATININE-BSD FRML MDRD: >90 ML/MIN/1.7M2
GLUCOSE SERPL-MCNC: 109 MG/DL (ref 70–99)
POTASSIUM SERPL-SCNC: 3.5 MMOL/L (ref 3.4–5.3)
PROT SERPL-MCNC: 6.9 G/DL (ref 6.8–8.8)
SODIUM SERPL-SCNC: 139 MMOL/L (ref 133–144)

## 2018-05-16 PROCEDURE — 80053 COMPREHEN METABOLIC PANEL: CPT | Performed by: FAMILY MEDICINE

## 2018-05-16 PROCEDURE — 36415 COLL VENOUS BLD VENIPUNCTURE: CPT | Performed by: FAMILY MEDICINE

## 2018-05-16 PROCEDURE — 99214 OFFICE O/P EST MOD 30 MIN: CPT | Performed by: FAMILY MEDICINE

## 2018-05-16 RX ORDER — LEVOTHYROXINE SODIUM 150 UG/1
150 TABLET ORAL DAILY
Qty: 90 TABLET | Refills: 2 | Status: SHIPPED | OUTPATIENT
Start: 2018-05-16 | End: 2019-02-03

## 2018-05-16 RX ORDER — GABAPENTIN 600 MG/1
TABLET ORAL
Qty: 90 TABLET | Refills: 0 | Status: SHIPPED | OUTPATIENT
Start: 2018-05-16 | End: 2018-05-16

## 2018-05-16 RX ORDER — METOCLOPRAMIDE 10 MG/1
10 TABLET ORAL 4 TIMES DAILY PRN
Qty: 40 TABLET | Refills: 0 | Status: SHIPPED | OUTPATIENT
Start: 2018-05-16 | End: 2018-08-02

## 2018-05-16 RX ORDER — GABAPENTIN 600 MG/1
600 TABLET ORAL 3 TIMES DAILY
Qty: 90 TABLET | Refills: 0 | Status: SHIPPED | OUTPATIENT
Start: 2018-05-16 | End: 2018-06-12

## 2018-05-16 NOTE — PROGRESS NOTES
SUBJECTIVE:   Bia Bill is a 52 year old female who presents to clinic today for the following health issues:    Chief Complaint   Patient presents with     RECHECK     bilateral leg swelling     Patient in clinic today to recheck the bilateral leg swelling. Patient in still using her compression leg wraps. Her right leg swelling has decreased but her left leg not as much. Patient states her nerves in her leg are acting up really bad. She states she has leg spasms.    Wt Readings from Last 10 Encounters:   05/16/18 232 lb 1.6 oz (105.3 kg)   05/03/18 231 lb 12.8 oz (105.1 kg)   05/02/18 233 lb 12.8 oz (106.1 kg)   04/24/18 223 lb (101.2 kg)   03/30/18 223 lb 14.4 oz (101.6 kg)   03/23/18 224 lb 14.4 oz (102 kg)   03/22/18 224 lb 13.9 oz (102 kg)   03/14/18 224 lb 14.4 oz (102 kg)   12/01/17 221 lb (100.2 kg)   11/13/17 220 lb 3.2 oz (99.9 kg)     Problem list and histories reviewed & adjusted, as indicated.  Additional history: as documented    Patient Active Problem List   Diagnosis     Mild persistent asthma     Polyneuropathy in other diseases classified elsewhere (H)     DVT (deep venous thrombosis) (H)     Alcohol abuse, in remission     SECONDARY POLYCYTHEMIA     Chondromalacia of patella     Sensorineural hearing loss, asymmetrical     Developmental reading disorder     Esophageal reflux     Hypothyroidism     Fatty liver     GILES (Obstructive Sleep Apnea)-Moderate (AHI 16)     RLS (restless legs syndrome)     Smoker     Erythrocytosis     Moderate mixed bipolar I disorder (H)     Personality disorder, depressive     COPD (chronic obstructive pulmonary disease) (H)     Current use of long term anticoagulation     Rosacea     Health Care Home     DDD (degenerative disc disease), cervical     Benign neoplasm of colon (POLYPOSIS)     Stroke (H)     Somatization disorder     Sebaceous cyst of right axilla     HTN, goal below 140/90     Left leg pain     Vitamin D deficiency     Long term current use of  anticoagulant therapy     Morbid obesity (H)     PVD (peripheral vascular disease) with claudication (H)     Cyst of left ovary     Morbid obesity with alveolar hypoventilation (H)     GI bleed     Severe episode of recurrent major depressive disorder (H)     Conductive hearing loss of left ear with restricted hearing of right ear     Sensorineural hearing loss (SNHL) of right ear with restricted hearing of left ear     Past Surgical History:   Procedure Laterality Date     BILIARY STENT SINGLE USE COV      3 stints in left leg     BONE MARROW BIOPSY, BONE SPECIMEN, NEEDLE/TROCAR N/A 11/17/2014    Procedure: BIOPSY BONE MARROW;  Surgeon: Hay Aaron MD;  Location: [facility]     D & C  10/26/09    with uterine ablation     ESOPHAGOSCOPY, GASTROSCOPY, DUODENOSCOPY (EGD), COMBINED  4/21/2014    Procedure: Gastroscopy;  Surgeon: Moris Thomas MD;  Location: [facility]     HYSTERECTOMY, PAP NO LONGER INDICATED  1-4-2010     LITHOTRIPSY  2004    Lithotrypsy       Social History   Substance Use Topics     Smoking status: Current Every Day Smoker     Packs/day: 0.50     Years: 34.00     Types: Cigarettes     Smokeless tobacco: Never Used      Comment: started at age 11.  Quit during pregnancyX4.  Quit 3 months ago.      Alcohol use No      Comment: quit 1995     Family History   Problem Relation Age of Onset     Hypertension Mother      DIABETES Mother      Blood Disease Mother      HEART DISEASE Mother      chf     CEREBROVASCULAR DISEASE Mother      Hypertension Father      CANCER Father      lung cancer     Allergies Sister      DIABETES Sister      Depression Sister      Hypertension Sister            Reviewed and updated as needed this visit by clinical staff       Reviewed and updated as needed this visit by Provider         ROS:  Constitutional, HEENT, cardiovascular, pulmonary, gi and gu systems are negative, except as otherwise noted.    OBJECTIVE:     /70  Pulse 76  Temp 98.3  F (36.8  C)  (Tympanic)  Resp 20  Wt 232 lb 1.6 oz (105.3 kg)  LMP 09/27/2009  BMI 46.88 kg/m2  Body mass index is 46.88 kg/(m^2).  GENERAL: healthy, alert and no distress  NECK: no adenopathy, no asymmetry, masses, or scars and thyroid normal to palpation  RESP: lungs clear to auscultation - no rales, rhonchi or wheezes  CV: regular rate and rhythm, normal S1 S2, no S3 or S4, no murmur, click or rub, no peripheral edema and peripheral pulses strong  ABDOMEN: soft, nontender, no hepatosplenomegaly, no masses and bowel sounds normal  MS: no gross musculoskeletal defects noted, no edema    Diagnostic Test Results:  none     ASSESSMENT/PLAN:     (Z79.01) Current use of long term anticoagulation  (primary encounter diagnosis)  Plan: INR CLINIC REFERRAL neds closer folow up    (G63) Polyneuropathy in other diseases classified elsewhere (H)  Plan: gabapentin (NEURONTIN) 600 MG tablet,   Good control.  Continue currant medications, continue to monito         Comprehensive metabolic panel (BMP + Alb, Alk         Phos, ALT, AST, Total. Bili, TP)       (E03.9) Hypothyroidism, unspecified type  Good control.  Continue currant medications, continue to monito   Plan: levothyroxine (SYNTHROID/LEVOTHROID) 150 MCG         tablet       (R14.3,  R14.1,  R14.2) Flatulence, eructation, and gas pain  Plan: metoclopramide (REGLAN) 10 MG tablet    (J44.9) Chronic obstructive pulmonary disease, unspecified COPD type (H) stable  Plan: COPD ACTION PLAN      (I89.0) Lymphedema  Good control.  Continue currant medications, continue to monito limit salt  Keep legs elevated        Kd Leong MD  [facility]

## 2018-05-16 NOTE — MR AVS SNAPSHOT
After Visit Summary   5/16/2018    Bia Bill    MRN: [id number]           Patient Information     Date Of Birth          1965        Visit Information        Provider Department      5/16/2018 10:00 AM Kd Leong MD [facility]        Today's Diagnoses     Current use of long term anticoagulation    -  1    Polyneuropathy in other diseases classified elsewhere (H)        Hypothyroidism, unspecified type        Flatulence, eructation, and gas pain        Chronic obstructive pulmonary disease, unspecified COPD type (H)        Lymphedema           Follow-ups after your visit        Additional Services     INR CLINIC REFERRAL       Your provider has referred you to INR Services.    Please be aware that coverage of these services is subject to the terms and limitations of your health insurance plan.  Call member services at your health plan with any benefit or coverage questions.    Indication for Anticoagulation: DVT (recurrent)  If nonstandard INR is desired, indicate goal range and explanation:   Expected Duration of Therapy: Lifetime                  Your next 10 appointments already scheduled     May 21, 2018  9:30 AM CDT   Return Visit with Kd Morris   [facility] ([facility])    [address] [address]   [phone]            May 21, 2018 10:30 AM CDT   Return Visit with Hannah Middleton   [facility] ([facility]    [address] [address]   [phone]            May 23, 2018  9:30 AM CDT   Treatment with Cassia Montoya PTA   [facility] ([facility])    [address] [address]   [phone]            Jun 08, 2018  9:30 AM CDT   Return Visit with Kd Morris   [facility] ([facility])    [address] [address]   [phone]            Jun 22, 2018  1:00  PM CDT   LAB with [place] [facility]   [facility] ([facility])    [address] [address]   [phone]           Please do not eat 10-12 hours before your appointment if you are coming in fasting for labs on lipids, cholesterol, or glucose (sugar). This does not apply to pregnant women. Water, hot tea and black coffee (with nothing added) are okay. Do not drink other fluids, diet soda or chew gum.            Sep 21, 2018  1:00 PM CDT   LAB with [place] [facility]   [facility] ([facility])    [address] [address]   [phone]           Please do not eat 10-12 hours before your appointment if you are coming in fasting for labs on lipids, cholesterol, or glucose (sugar). This does not apply to pregnant women. Water, hot tea and black coffee (with nothing added) are okay. Do not drink other fluids, diet soda or chew gum.            Sep 28, 2018  1:30 PM CDT   Return Visit with Joshua Watts MD   [facility] ([facility])    [facility]  [address]  [facility] [address]   [phone]              Who to contact     Normal or non-critical lab and imaging results will be communicated to you by MyChart, letter or phone within 4 business days after the clinic has received the results. If you do not hear from us within 7 days, please contact the clinic through MyChart or phone. If you have a critical or abnormal lab result, we will notify you by phone as soon as possible.  Submit refill requests through [employer] or call your pharmacy and they will forward the refill request to us. Please allow 3 business days for your refill to be completed.          If you need to speak with a  for additional information , please call: [phone]             Additional Information About Your Visit        [employer] Information     [employer] lets you send messages to your doctor,  "view your test results, renew your prescriptions, schedule appointments and more. To sign up, go to [email or web address] . Click on \"Log in\" on the left side of the screen, which will take you to the Welcome page. Then click on \"Sign up Now\" on the right side of the page.     You will be asked to enter the access code listed below, as well as some personal information. Please follow the directions to create your username and password.     Your access code is: [email or web address]  Expires: 2018  1:56 PM     Your access code will  in 90 days. If you need help or a new code, please call your [facility] clinic or [phone].        Care EveryWhere ID     This is your Care EveryWhere ID. This could be used by other organizations to access your [facility] medical records  [id number]        Your Vitals Were     Pulse Temperature Respirations Last Period BMI (Body Mass Index)       76 98.3  F (36.8  C) (Tympanic) 20 2009 46.88 kg/m2        Blood Pressure from Last 3 Encounters:   18 124/70   18 145/71   18 130/63    Weight from Last 3 Encounters:   18 232 lb 1.6 oz (105.3 kg)   18 231 lb 12.8 oz (105.1 kg)   18 233 lb 12.8 oz (106.1 kg)              We Performed the Following     Comprehensive metabolic panel (BMP + Alb, Alk Phos, ALT, AST, Total. Bili, TP)     COPD ACTION PLAN     INR CLINIC REFERRAL          Today's Medication Changes          These changes are accurate as of 18 11:59 PM.  If you have any questions, ask your nurse or doctor.               Start taking these medicines.        Dose/Directions    potassium chloride SA 10 MEQ CR tablet   Commonly known as:  K-DUR/KLOR-CON M   Used for:  Lymphedema   Started by:  Kd Leong MD        Dose:  10 mEq   Take 1 tablet (10 mEq) by mouth daily   Quantity:  30 tablet   Refills:  1         These medicines have changed or have updated prescriptions.        Dose/Directions    gabapentin 600 MG tablet   Commonly " known as:  NEURONTIN   This may have changed:  See the new instructions.   Used for:  Polyneuropathy in other diseases classified elsewhere (H)   Changed by:  Kd Leong MD        Dose:  600 mg   Take 1 tablet (600 mg) by mouth 3 times daily   Quantity:  90 tablet   Refills:  0       levothyroxine 150 MCG tablet   Commonly known as:  SYNTHROID/LEVOTHROID   This may have changed:  See the new instructions.   Used for:  Hypothyroidism, unspecified type   Changed by:  Kd Leong MD        Dose:  150 mcg   Take 1 tablet (150 mcg) by mouth daily   Quantity:  90 tablet   Refills:  2            Where to get your medicines      These medications were sent to [id number] - [place], [place] - [address]  [address] [address]    Hours:  Closed 10-16-08 business to [facility] Phone:  [phone]     gabapentin 600 MG tablet    levothyroxine 150 MCG tablet    metoclopramide 10 MG tablet    potassium chloride SA 10 MEQ CR tablet                Primary Care Provider Office Phone # Fax #    Kd Leong [phone] [address] [address]        Equal Access to Services     [facility] AH: Hadii jose zamora hadasho Soomaali, waaxda luqadaha, qaybta kaalmada adeuzair, nitin roberts . So [facility] [phone].    ATENCIÓN: Si habla español, tiene a terry disposición servicios gratuitos de asistencia lingüística. [facility] [phone].    We comply with applicable federal civil rights laws and [place] laws. We do not discriminate on the basis of race, color, national origin, age, disability, sex, sexual orientation, or gender identity.            Thank you!     Thank you for choosing [facility]  for your care. Our goal is always to provide you with excellent care. Hearing back from our patients is one way we can continue to improve our services. Please take a few minutes to complete the written survey that you may  receive in the mail after your visit with us. Thank you!             Your Updated Medication List - Protect others around you: Learn how to safely use, store and throw away your medicines at www.disposemymeds.org.          This list is accurate as of 5/16/18 11:59 PM.  Always use your most recent med list.                   Brand Name Dispense Instructions for use Diagnosis    acetaminophen 500 MG tablet    TYLENOL     Take 500-1,000 mg by mouth every 6 hours as needed for mild pain        * albuterol 108 (90 Base) MCG/ACT Inhaler    PROAIR HFA/PROVENTIL HFA/VENTOLIN HFA    3 Inhaler    Inhale 2 puffs into the lungs every 6 hours as needed for shortness of breath / dyspnea or wheezing    Mild persistent asthma without complication       * albuterol (2.5 MG/3ML) 0.083% neb solution     1 Box    Take 1 vial (2.5 mg) by nebulization every 6 hours as needed for shortness of breath / dyspnea or wheezing    Mild persistent asthma without complication       allopurinol 100 MG tablet    ZYLOPRIM    90 tablet    TAKE ONE TABLET BY MOUTH ONE TIME DAILY    Acute gouty arthritis       ARIPiprazole 5 MG tablet    ABILIFY    180 tablet    Take 1.5 tablets (7.5 mg) by mouth At Bedtime    Major depressive disorder, recurrent episode, moderate (H)       buPROPion 150 MG 24 hr tablet    WELLBUTRIN XL    90 tablet    TAKE  ONE TABLET BY MOUTH EVERY DAY IN THE MORNING    COPD exacerbation (H)       calcium citrate-vitamin D 315-250 MG-UNIT Tabs per tablet    CITRACAL     Take 2 tablets by mouth daily        EPINEPHrine 0.3 MG/0.3ML injection 2-pack    EPIPEN/ADRENACLICK/or ANY BX GENERIC EQUIV    0.6 mL    Inject 0.3 mLs (0.3 mg) into the muscle once as needed for anaphylaxis    Anaphylactic reaction to bee sting, undetermined intent, subsequent encounter       furosemide 20 MG tablet    LASIX    180 tablet    Take 2 tablets (40 mg) by mouth 2 times daily    Lymphedema of both lower extremities       gabapentin 600 MG tablet     "NEURONTIN    90 tablet    Take 1 tablet (600 mg) by mouth 3 times daily    Polyneuropathy in other diseases classified elsewhere (H)       ipratropium - albuterol 0.5 mg/2.5 mg/3 mL 0.5-2.5 (3) MG/3ML neb solution    DUONEB    30 vial    Take 1 vial (3 mLs) by nebulization every 6 hours as needed for shortness of breath / dyspnea or wheezing    Chronic obstructive pulmonary disease, unspecified COPD type (H)       lamoTRIgine 200 MG tablet    LaMICtal    90 tablet    Take 1 tablet (200 mg) by mouth daily    Major depressive disorder, recurrent episode, moderate (H)       levothyroxine 150 MCG tablet    SYNTHROID/LEVOTHROID    90 tablet    Take 1 tablet (150 mcg) by mouth daily    Hypothyroidism, unspecified type       metoclopramide 10 MG tablet    REGLAN    40 tablet    Take 1 tablet (10 mg) by mouth 4 times daily as needed    Flatulence, eructation, and gas pain       mometasone-formoterol 200-5 MCG/ACT oral inhaler    DULERA    13 g    Inhale 2 puffs into the lungs 2 times daily    COPD exacerbation (H), Chronic obstructive pulmonary disease, unspecified COPD type (H)       omeprazole 20 MG CR capsule    priLOSEC    180 capsule    TAKE ONE CAPSULE BY MOUTH TWICE DAILY    Gastroesophageal reflux disease without esophagitis       ondansetron 4 MG tablet    ZOFRAN    10 tablet    Take 1 tablet (4 mg) by mouth every 8 hours as needed for nausea    Nausea and vomiting, intractability of vomiting not specified, unspecified vomiting type       * order for DME      Equipment being ordered: CPAP AIRSENSE 10 5-18 CM H20 SN# [id number]   DN# [id number]        * order for DME     1 Month    Equipment being ordered: INCONTINENCE PADS  QID    Other urinary incontinence       * order for DME     60 Month    Equipment being ordered: DEPENDS SIZE LARGE    Mixed incontinence       * order for DME     2 Units    Equipment being ordered: [id number]\" x2pair   \"20-30mmHg Farrow Hybrid Liners\" x 2 pair    Peripheral edema       order for " DME     1 Device    Equipment being ordered: Nebulizer    COPD exacerbation (H), Chronic obstructive pulmonary disease, unspecified COPD type (H)       order for DME     1 each    Equipment being ordered:x2 [employer] 30/40mmHg compression wraps with x2 extra prs of compression liners    Secondary lymphedema       potassium chloride SA 10 MEQ CR tablet    K-DUR/KLOR-CON M    30 tablet    Take 1 tablet (10 mEq) by mouth daily    Lymphedema       simvastatin 20 MG tablet    ZOCOR    30 tablet    TAKE ONE TABLET BY MOUTH AT BEDTIME    Hypercholesteremia       traZODone 150 MG tablet    DESYREL    30 tablet    TAKE ONE TABLET BY MOUTH NIGHTLY AT BEDTIME    Major depressive disorder, recurrent episode, moderate (H)       warfarin 5 MG tablet    COUMADIN    180 tablet    Take 12.5 mg on Wed; 10 mg all other days or as directed by the Anticoagulation Clinic    DVT (deep venous thrombosis) (H)       * Notice:  This list has 6 medication(s) that are the same as other medications prescribed for you. Read the directions carefully, and ask your doctor or other care provider to review them with you.

## 2018-05-17 ENCOUNTER — TELEPHONE (OUTPATIENT)
Dept: ANTICOAGULATION | Facility: CLINIC | Age: 53
End: 2018-05-17

## 2018-05-17 ENCOUNTER — TELEPHONE (OUTPATIENT)
Dept: FAMILY MEDICINE | Facility: CLINIC | Age: 53
End: 2018-05-17

## 2018-05-17 DIAGNOSIS — I82.409 DVT (DEEP VENOUS THROMBOSIS) (H): Chronic | ICD-10-CM

## 2018-05-17 DIAGNOSIS — Z79.01 LONG TERM CURRENT USE OF ANTICOAGULANT THERAPY: Chronic | ICD-10-CM

## 2018-05-17 DIAGNOSIS — I63.9 STROKE (H): Primary | ICD-10-CM

## 2018-05-17 RX ORDER — POTASSIUM CHLORIDE 750 MG/1
10 TABLET, EXTENDED RELEASE ORAL DAILY
Qty: 30 TABLET | Refills: 1 | Status: SHIPPED | OUTPATIENT
Start: 2018-05-17 | End: 2018-07-11

## 2018-05-17 NOTE — TELEPHONE ENCOUNTER
Lelia call he potassium is okay but on the low end of normal.  With her continuing to take diuretic twice a day I think that larisa get low, and is causing her leg cramps.    I want her to start a low dose potassium 10 meq daily  Script was sent to her pharm

## 2018-05-21 ENCOUNTER — TELEPHONE (OUTPATIENT)
Dept: FAMILY MEDICINE | Facility: CLINIC | Age: 53
End: 2018-05-21

## 2018-05-21 ENCOUNTER — ANTICOAGULATION THERAPY VISIT (OUTPATIENT)
Dept: ANTICOAGULATION | Facility: CLINIC | Age: 53
End: 2018-05-21
Payer: COMMERCIAL

## 2018-05-21 ENCOUNTER — OFFICE VISIT (OUTPATIENT)
Dept: PSYCHOLOGY | Facility: CLINIC | Age: 53
End: 2018-05-21
Payer: COMMERCIAL

## 2018-05-21 DIAGNOSIS — Z79.01 LONG TERM CURRENT USE OF ANTICOAGULANT THERAPY: ICD-10-CM

## 2018-05-21 DIAGNOSIS — I73.9 PERIPHERAL VASCULAR DISEASE WITH CLAUDICATION (H): ICD-10-CM

## 2018-05-21 DIAGNOSIS — I73.9 PVD (PERIPHERAL VASCULAR DISEASE) WITH CLAUDICATION (H): ICD-10-CM

## 2018-05-21 DIAGNOSIS — F31.62 MODERATE MIXED BIPOLAR I DISORDER (H): Primary | ICD-10-CM

## 2018-05-21 DIAGNOSIS — D75.1 ERYTHROCYTOSIS: Chronic | ICD-10-CM

## 2018-05-21 DIAGNOSIS — I82.409 DVT (DEEP VENOUS THROMBOSIS) (H): ICD-10-CM

## 2018-05-21 LAB
BASOPHILS # BLD AUTO: 0 10E9/L (ref 0–0.2)
BASOPHILS NFR BLD AUTO: 0.5 %
DIFFERENTIAL METHOD BLD: ABNORMAL
EOSINOPHIL # BLD AUTO: 0.1 10E9/L (ref 0–0.7)
EOSINOPHIL NFR BLD AUTO: 1.7 %
ERYTHROCYTE [DISTWIDTH] IN BLOOD BY AUTOMATED COUNT: 20.6 % (ref 10–15)
HCT VFR BLD AUTO: 59.7 % (ref 35–47)
HGB BLD-MCNC: 19.2 G/DL (ref 11.7–15.7)
IMM GRANULOCYTES # BLD: 0 10E9/L (ref 0–0.4)
IMM GRANULOCYTES NFR BLD: 0.2 %
INR PPP: 2.41 (ref 0.86–1.14)
LYMPHOCYTES # BLD AUTO: 1.4 10E9/L (ref 0.8–5.3)
LYMPHOCYTES NFR BLD AUTO: 32.4 %
MCH RBC QN AUTO: 29.1 PG (ref 26.5–33)
MCHC RBC AUTO-ENTMCNC: 32.2 G/DL (ref 31.5–36.5)
MCV RBC AUTO: 91 FL (ref 78–100)
MONOCYTES # BLD AUTO: 0.4 10E9/L (ref 0–1.3)
MONOCYTES NFR BLD AUTO: 10.2 %
NEUTROPHILS # BLD AUTO: 2.3 10E9/L (ref 1.6–8.3)
NEUTROPHILS NFR BLD AUTO: 55 %
PLATELET # BLD AUTO: 104 10E9/L (ref 150–450)
RBC # BLD AUTO: 6.6 10E12/L (ref 3.8–5.2)
WBC # BLD AUTO: 4.2 10E9/L (ref 4–11)

## 2018-05-21 PROCEDURE — 90834 PSYTX W PT 45 MINUTES: CPT | Performed by: PSYCHOLOGIST

## 2018-05-21 PROCEDURE — 36415 COLL VENOUS BLD VENIPUNCTURE: CPT | Performed by: FAMILY MEDICINE

## 2018-05-21 PROCEDURE — 85025 COMPLETE CBC W/AUTO DIFF WBC: CPT | Performed by: FAMILY MEDICINE

## 2018-05-21 PROCEDURE — 99207 ZZC NO CHARGE NURSE ONLY: CPT

## 2018-05-21 PROCEDURE — 85610 PROTHROMBIN TIME: CPT | Performed by: FAMILY MEDICINE

## 2018-05-21 NOTE — MR AVS SNAPSHOT
Bia S Fly   5/21/2018   Anticoagulation Therapy Visit    Description:  52 year old female   Provider:  Jaleesa Coronado, RN   Department:  [facility]           INR as of 5/21/2018     Today's INR 2.41      Anticoagulation Summary as of 5/21/2018     INR goal 2.0-2.5   Today's INR 2.41   Full instructions 12.5 mg on Wed; 10 mg all other days   Next INR check 6/4/2018    Indications   Long term current use of anticoagulant therapy [Z79.01]  DVT (deep venous thrombosis) (H) [I82.409]  PVD (peripheral vascular disease) with claudication (H) [I73.9]         May 2018 Details    Sun Mon Tue Wed Thu Fri Sat       1               2               3               4               5                 6               7               8               9               10               11               12                 13               14               15               16               17               18               19                 20               21      10 mg   See details      22      10 mg         23      12.5 mg         24      10 mg         25      10 mg         26      10 mg           27      10 mg         28      10 mg         29      10 mg         30      12.5 mg         31      10 mg            Date Details   05/21 This INR check               How to take your warfarin dose     To take:  10 mg Take 2 of the 5 mg tablets.    To take:  12.5 mg Take 2.5 of the 5 mg tablets.           June 2018 Details    Sun Mon Tue Wed Thu Fri Sat          1      10 mg         2      10 mg           3      10 mg         4            5               6               7               8               9                 10               11               12               13               14               15               16                 17               18               19               20               21               22               23                 24               25               26               27               28                29 30                Date Details   No additional details    Date of next INR:  6/4/2018         How to take your warfarin dose     To take:  10 mg Take 2 of the 5 mg tablets.

## 2018-05-21 NOTE — TELEPHONE ENCOUNTER
Central Prior Authorization Team   Phone: [phone]      PA Initiation    Medication: Aripiprazole 5 mg tabs  Insurance Company: [employer] - Phone [phone] Fax [phone]  Pharmacy Filling the Rx: [id number] [place] [address] [address]  Filling Pharmacy Phone: [phone]  Filling Pharmacy Fax: [phone]  Start Date: 5/21/2018

## 2018-05-21 NOTE — TELEPHONE ENCOUNTER
Prior Authorization Approval    Authorization Effective Date:    Authorization Expiration Date:    Medication: Aripiprazole 5 mg tabs  Approved Dose/Quantity:    Reference #:     Insurance Company: [employer] - Phone [phone] Fax [phone]  Expected CoPay:       CoPay Card Available:      Foundation Assistance Needed:    Which Pharmacy is filling the prescription (Not needed for infusion/clinic administered): [id number] - [facility] [address]  Pharmacy Notified: Yes  Patient Notified: Yes    This was approved through EPA       chills

## 2018-05-21 NOTE — PROGRESS NOTES
ANTICOAGULATION FOLLOW-UP CLINIC VISIT    Patient Name:  Bia Bill  Date:  5/21/2018  Contact Type:  Telephone/ Spoke to pt    SUBJECTIVE:     Patient Findings     Positives Change in medications (Potassium was added from last week. Lasix was increased to 80 mg.), No Problem Findings    Comments No changes in activity or diet noted. No bleeding or increased bruising noted. Took warfarin as prescribed.  Patient is to continue maintenance warfarin plan, and check INR in two weeks.  Patient verbalizes understanding and agrees to plan. No further questions or concerns.           OBJECTIVE    INR   Date Value Ref Range Status   05/21/2018 2.41 (H) 0.86 - 1.14 Final     Comment:     Special tube used to correct for high hematocrit       ASSESSMENT / PLAN  INR assessment THER    Recheck INR In: 2 WEEKS    INR Location Outside lab      Anticoagulation Summary as of 5/21/2018     INR goal 2.0-2.5   Today's INR 2.41   Maintenance plan 12.5 mg (5 mg x 2.5) on Wed; 10 mg (5 mg x 2) all other days   Full instructions 12.5 mg on Wed; 10 mg all other days   Weekly total 72.5 mg   No change documented Jaleesa Coronado, RN   Plan last modified Angelina Nj, RN (5/3/2018)   Next INR check 6/4/2018   Priority INR   Target end date Indefinite    Indications   Long term current use of anticoagulant therapy [Z79.01]  DVT (deep venous thrombosis) (H) [I82.409]  PVD (peripheral vascular disease) with claudication (H) [I73.9]         Anticoagulation Episode Summary     INR check location     Preferred lab     Send INR reminders to [facility] PHONE [facility] POOL    Comments * lab draw due to elevated hematocrit (fingerstick meters don't work). LEFT LE. STENT x 3 LEFT UPPER LEG. Previous arterial clot. New goal range 2-2.5 starting 11/6/17.      Anticoagulation Care Providers     Provider Role Specialty Phone number    Kd Leong MD [facility] [phone]            See the Encounter Report to view  Anticoagulation Flowsheet and Dosing Calendar (Go to Encounters tab in chart review, and find the Anticoagulation Therapy Visit)        Jaleesa Coronado RN

## 2018-05-21 NOTE — TELEPHONE ENCOUNTER
Prior Authorization Retail Medication Request    Medication/Dose: Aripiprazole 5 mg tabs  ICD code (if different than what is on RX):  Major depressive disorder, recurrent episode, moderate (H) [F33.1]  Previously Tried and Failed:  Bupropion, Celexa, Effexor, Lexapro, Risperidone, Seroquel  Rationale:  Renewal    Insurance Name:  [employer]  Covermymeds:  Key- [id number]  Last name- Bill  -1965    Not Stated.

## 2018-05-21 NOTE — MR AVS SNAPSHOT
MRN:[id number]                      After Visit Summary   5/21/2018    Bia Bill    MRN: [id number]           Visit Information        Provider Department      5/21/2018 9:30 AM Kd Morris [facility]      Your next 10 appointments already scheduled     May 24, 2018 11:00 AM CDT   Return Visit with Hannah Middleton   [facility]    [address] [address]   [phone]            May 30, 2018 10:30 AM CDT   Treatment with Cassia Montoya PTA   [facility] ([facility])    [address] [address]   [phone]            May 31, 2018  1:40 PM CDT   SHORT with Kd Leong MD   [facility] ([facility])    [address] [facility] [address]   [phone]            Jun 08, 2018  9:30 AM CDT   Return Visit with Kd Morris   [facility] ([facility])    [address] [address]   [phone]            Jun 18, 2018  9:30 AM CDT   Return Visit with Kd Morris   [facility] ([facility])    [address] [address]   [phone]            Jun 22, 2018  1:00 PM CDT   LAB with [facility] [facility]   [facility] ([facility])    [address] [address]   [phone]           Please do not eat 10-12 hours before your appointment if you are coming in fasting for labs on lipids, cholesterol, or glucose (sugar). This does not apply to pregnant women. Water, hot tea and black coffee (with nothing added) are okay. Do not drink other fluids, diet soda or chew gum.            Sep 21, 2018  1:00 PM CDT   LAB with [place] [facility]   [facility])    [address] [address]   [phone]           Please do not eat 10-12 hours  "before your appointment if you are coming in fasting for labs on lipids, cholesterol, or glucose (sugar). This does not apply to pregnant women. Water, hot tea and black coffee (with nothing added) are okay. Do not drink other fluids, diet soda or chew gum.            Sep 28, 2018  1:30 PM CDT   Return Visit with Joshua Watts MD   [facility] ([facility])    [facility]  [address]  [facility] [address]   [phone]              MyChart Information     [employer] lets you send messages to your doctor, view your test results, renew your prescriptions, schedule appointments and more. To sign up, go to [email or web address] . Click on \"Log in\" on the left side of the screen, which will take you to the Welcome page. Then click on \"Sign up Now\" on the right side of the page.     You will be asked to enter the access code listed below, as well as some personal information. Please follow the directions to create your username and password.     Your access code is: [email or web address]  Expires: 2018  1:56 PM     Your access code will  in 90 days. If you need help or a new code, please call your [facility] clinic or [phone].        Care EveryWhere ID     This is your Care EveryWhere ID. This could be used by other organizations to access your [facility] medical records  [id number]        Equal Access to Services     ANTION[facility] AH: Hadii jose estrellao Sojossy, waaxda luqadaha, qaybta kaalmada ademilleryada, nitin craig. So [facility] [phone].    ATENCIÓN: Si habla español, tiene a terry disposición servicios gratuitos de asistencia lingüística. Llame al [phone].    We comply with applicable federal civil rights laws and [place] laws. We do not discriminate on the basis of race, color, national origin, age, disability, sex, sexual orientation, or gender identity.            "

## 2018-05-23 ENCOUNTER — HOSPITAL ENCOUNTER (OUTPATIENT)
Dept: PHYSICAL THERAPY | Facility: CLINIC | Age: 53
Setting detail: THERAPIES SERIES
End: 2018-05-23
Attending: FAMILY MEDICINE
Payer: COMMERCIAL

## 2018-05-23 PROCEDURE — 40000099 ZZH STATISTIC LYMPHEDEMA VISIT: Performed by: REHABILITATION PRACTITIONER

## 2018-05-23 PROCEDURE — 97140 MANUAL THERAPY 1/> REGIONS: CPT | Mod: GP | Performed by: REHABILITATION PRACTITIONER
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
## 2018-05-24 ENCOUNTER — OFFICE VISIT (OUTPATIENT)
Dept: AUDIOLOGY | Facility: CLINIC | Age: 53
End: 2018-05-24
Payer: COMMERCIAL

## 2018-05-24 DIAGNOSIS — H90.A12 CONDUCTIVE HEARING LOSS OF LEFT EAR WITH RESTRICTED HEARING OF RIGHT EAR: Primary | ICD-10-CM

## 2018-05-24 DIAGNOSIS — H90.A21 SENSORINEURAL HEARING LOSS (SNHL) OF RIGHT EAR WITH RESTRICTED HEARING OF LEFT EAR: ICD-10-CM

## 2018-05-24 PROCEDURE — 99207 ZZC NO CHARGE LOS: CPT | Performed by: AUDIOLOGIST

## 2018-05-24 PROCEDURE — V5160 DISPENSING FEE BINAURAL: HCPCS | Performed by: AUDIOLOGIST

## 2018-05-24 PROCEDURE — V5261 HEARING AID, DIGIT, BIN, BTE: HCPCS | Mod: NU | Performed by: AUDIOLOGIST

## 2018-05-24 ASSESSMENT — PATIENT HEALTH QUESTIONNAIRE - PHQ9: SUM OF ALL RESPONSES TO PHQ QUESTIONS 1-9: 11

## 2018-05-24 NOTE — MR AVS SNAPSHOT
After Visit Summary   5/24/2018    Bia Bill    MRN: [id number]           Patient Information     Date Of Birth          1965        Visit Information        Provider Department      5/24/2018 11:00 AM Rhiannon Fong AuD [facility]        Today's Diagnoses     Conductive hearing loss of left ear with restricted hearing of right ear    -  1    Sensorineural hearing loss (SNHL) of right ear with restricted hearing of left ear           Follow-ups after your visit        Your next 10 appointments already scheduled     May 30, 2018 10:30 AM CDT   Treatment with Cassia Montoya PTA   [facility] ([facility])    [address] [facility] [address]   [phone]            May 31, 2018  1:40 PM CDT   SHORT with Kd Leong MD   [facility] ([facility])    [address] [facility] [address]   [phone]            Jun 08, 2018  9:30 AM CDT   Return Visit with Kd Morris   [facility] ([facility])    [address] [address]   [phone]            Jun 12, 2018  2:00 PM CDT   Return Visit with Hannah Middleton   [facility] ([facility])    [address] [address]   [phone]            Jun 18, 2018  9:30 AM CDT   Return Visit with Kd Morris   [facility] ([facility])    [address] [address]   [phone]            Jun 22, 2018  1:00 PM CDT   LAB with [facility] ([facility])    [address] [address]   [phone]           Please do not eat 10-12 hours before your appointment if you are coming in fasting for labs on lipids, cholesterol, or glucose (sugar). This does not apply to pregnant women. Water, hot tea and black coffee (with nothing added) are okay. Do not drink other  "fluids, diet soda or chew gum.            Sep 21, 2018  1:00 PM CDT   LAB with [facility] ([facility])    [address]  [facility] [address] [phone]           Please do not eat 10-12 hours before your appointment if you are coming in fasting for labs on lipids, cholesterol, or glucose (sugar). This does not apply to pregnant women. Water, hot tea and black coffee (with nothing added) are okay. Do not drink other fluids, diet soda or chew gum.            Sep 28, 2018  1:30 PM CDT   Return Visit with Joshua Watts MD   [facility] ([facility])    [facility]  [address]  [facility] [id number] [phone]              Who to contact     If you have questions or need follow up information about today's clinic visit or your schedule please contact [facility] directly at [phone].  Normal or non-critical lab and imaging results will be communicated to you by MyChart, letter or phone within 4 business days after the clinic has received the results. If you do not hear from us within 7 days, please contact the clinic through [facility] or phone. If you have a critical or abnormal lab result, we will notify you by phone as soon as possible.  Submit refill requests through [facility] or call your pharmacy and they will forward the refill request to us. Please allow 3 business days for your refill to be completed.          Additional Information About Your Visit        [facility] Information     [facility] lets you send messages to your doctor, view your test results, renew your prescriptions, schedule appointments and more. To sign up, go to [email or web address] . Click on \"Log in\" on the left side of the screen, which will take you to the Welcome page. Then click on \"Sign up Now\" on the right side of the page.     You will be asked to enter the access code listed below, as well as some personal " information. Please follow the directions to create your username and password.     Your access code is: [email or web address]  Expires: 2018  1:56 PM     Your access code will  in 90 days. If you need help or a new code, please call your [place] clinic or [phone].        Care EveryWhere ID     This is your Care EveryWhere ID. This could be used by other organizations to access your [place] medical records  [id number]        Your Vitals Were     Last Period                   2009            Blood Pressure from Last 3 Encounters:   18 124/70   18 145/71   18 130/63    Weight from Last 3 Encounters:   18 232 lb 1.6 oz (105.3 kg)   18 231 lb 12.8 oz (105.1 kg)   18 233 lb 12.8 oz (106.1 kg)              We Performed the Following     DISPENSING FEE, BINAURAL HEARING AID     HEARING AID BTE DIGITAL, BINAURAL        Primary Care Provider Office Phone # Fax #    Kd Leong [phone] [address] [facility] [address]        Equal Access to Services     [facility]: Hadii aad ku hadasho Soomaali, waaxda luqadaha, qaybta kaalmada adeegyada, waxay idiin hayaan jalil roberts . So [facility] [phone].    ATENCIÓN: Si habla español, tiene a terry disposición servicios gratuitos de asistencia lingüística. Natalie al [phone].    We comply with applicable federal civil rights laws and [place] laws. We do not discriminate on the basis of race, color, national origin, age, disability, sex, sexual orientation, or gender identity.            Thank you!     Thank you for choosing [facility]  for your care. Our goal is always to provide you with excellent care. Hearing back from our patients is one way we can continue to improve our services. Please take a few minutes to complete the written survey that you may receive in the mail after your visit with us. Thank you!             Your Updated Medication List - Protect others  around you: Learn how to safely use, store and throw away your medicines at www.disposemymeds.org.          This list is accurate as of 5/24/18 11:38 AM.  Always use your most recent med list.                   Brand Name Dispense Instructions for use Diagnosis    acetaminophen 500 MG tablet    TYLENOL     Take 500-1,000 mg by mouth every 6 hours as needed for mild pain        * albuterol 108 (90 Base) MCG/ACT Inhaler    PROAIR HFA/PROVENTIL HFA/VENTOLIN HFA    3 Inhaler    Inhale 2 puffs into the lungs every 6 hours as needed for shortness of breath / dyspnea or wheezing    Mild persistent asthma without complication       * albuterol (2.5 MG/3ML) 0.083% neb solution     1 Box    Take 1 vial (2.5 mg) by nebulization every 6 hours as needed for shortness of breath / dyspnea or wheezing    Mild persistent asthma without complication       allopurinol 100 MG tablet    ZYLOPRIM    90 tablet    TAKE ONE TABLET BY MOUTH ONE TIME DAILY    Acute gouty arthritis       ARIPiprazole 5 MG tablet    ABILIFY    180 tablet    Take 1.5 tablets (7.5 mg) by mouth At Bedtime    Major depressive disorder, recurrent episode, moderate (H)       buPROPion 150 MG 24 hr tablet    WELLBUTRIN XL    90 tablet    TAKE  ONE TABLET BY MOUTH EVERY DAY IN THE MORNING    COPD exacerbation (H)       calcium citrate-vitamin D 315-250 MG-UNIT Tabs per tablet    CITRACAL     Take 2 tablets by mouth daily        EPINEPHrine 0.3 MG/0.3ML injection 2-pack    EPIPEN/ADRENACLICK/or ANY BX GENERIC EQUIV    0.6 mL    Inject 0.3 mLs (0.3 mg) into the muscle once as needed for anaphylaxis    Anaphylactic reaction to bee sting, undetermined intent, subsequent encounter       furosemide 20 MG tablet    LASIX    180 tablet    Take 2 tablets (40 mg) by mouth 2 times daily    Lymphedema of both lower extremities       * gabapentin 600 MG tablet    NEURONTIN    90 tablet    Take 1 tablet (600 mg) by mouth 3 times daily    Polyneuropathy in other diseases classified  elsewhere (H)       * gabapentin 300 MG capsule    NEURONTIN    30 capsule    ONE CAPSULE BY MOUTH AT BEDTIME WITH 600 MG CAPSULE- FOR TOTAL  MG AT BEDTIME    Polyneuropathy in other diseases classified elsewhere (H)       ipratropium - albuterol 0.5 mg/2.5 mg/3 mL 0.5-2.5 (3) MG/3ML neb solution    DUONEB    30 vial    Take 1 vial (3 mLs) by nebulization every 6 hours as needed for shortness of breath / dyspnea or wheezing    Chronic obstructive pulmonary disease, unspecified COPD type (H)       lamoTRIgine 200 MG tablet    LaMICtal    90 tablet    Take 1 tablet (200 mg) by mouth daily    Major depressive disorder, recurrent episode, moderate (H)       levothyroxine 150 MCG tablet    SYNTHROID/LEVOTHROID    90 tablet    Take 1 tablet (150 mcg) by mouth daily    Hypothyroidism, unspecified type       metoclopramide 10 MG tablet    REGLAN    40 tablet    Take 1 tablet (10 mg) by mouth 4 times daily as needed    Flatulence, eructation, and gas pain       mometasone-formoterol 200-5 MCG/ACT oral inhaler    DULERA    13 g    Inhale 2 puffs into the lungs 2 times daily    COPD exacerbation (H), Chronic obstructive pulmonary disease, unspecified COPD type (H)       omeprazole 20 MG CR capsule    priLOSEC    180 capsule    TAKE ONE CAPSULE BY MOUTH TWICE DAILY    Gastroesophageal reflux disease without esophagitis       ondansetron 4 MG tablet    ZOFRAN    10 tablet    Take 1 tablet (4 mg) by mouth every 8 hours as needed for nausea    Nausea and vomiting, intractability of vomiting not specified, unspecified vomiting type       * order for DME      Equipment being ordered: CPAP AIRSENSE 10 5-18 CM H20 # [id number]   DN# [id number]        * order for DME     1 Month    Equipment being ordered: INCONTINENCE PADS  QID    Other urinary incontinence       * order for DME     60 Month    Equipment being ordered: DEPENDS SIZE LARGE    Mixed incontinence       * order for DME     2 Units    Equipment being ordered:  "[id number]\" x2pair   \"20-30mmHg Farrow Hybrid Liners\" x 2 pair    Peripheral edema       order for DME     1 Device    Equipment being ordered: Nebulizer    COPD exacerbation (H), Chronic obstructive pulmonary disease, unspecified COPD type (H)       order for DME     1 each    Equipment being ordered:x2 [employer] 30/40mmHg compression wraps with x2 extra prs of compression liners    Secondary lymphedema       potassium chloride SA 10 MEQ CR tablet    K-DUR/KLOR-CON M    30 tablet    Take 1 tablet (10 mEq) by mouth daily    Lymphedema       simvastatin 20 MG tablet    ZOCOR    30 tablet    TAKE ONE TABLET BY MOUTH AT BEDTIME    Hypercholesteremia       traZODone 150 MG tablet    DESYREL    30 tablet    TAKE ONE TABLET BY MOUTH NIGHTLY AT BEDTIME    Major depressive disorder, recurrent episode, moderate (H)       warfarin 5 MG tablet    COUMADIN    180 tablet    Take 12.5 mg on Wed; 10 mg all other days or as directed by the Anticoagulation Clinic    DVT (deep venous thrombosis) (H)       * Notice:  This list has 8 medication(s) that are the same as other medications prescribed for you. Read the directions carefully, and ask your doctor or other care provider to review them with you.      "

## 2018-05-24 NOTE — PROGRESS NOTES
AUDIOLOGY REPORT    SUBJECTIVE: Bia Bill, a 52 year old female, was seen in the Audiology Clinic at [facility] today for a Binaural hearing aid fitting. Previous results have revealed a bilateral asymmetrical sensorineural hearing loss. The patient was given medical clearance to pursue amplification by  Anila Hardy MD..Patient is an experienced hearing aid user.      OBJECTIVE:  Prior to fitting, a hearing aid check was performed to ensure device functionality. The hearing aid conformity evaluation was completed.The hearing aids were placed and they provided a good fit. Real-ear-probe-microphone measurements were completed on the [place] system and were a good match to NAL-NL2 target with soft sounds audible, moderate sounds comfortable, and loud sounds below discomfort. UCLs are verified through maximum power output measures and demonstrate appropriate limiting of loud inputs. Ms. Bill was oriented to proper hearing aid use, care, cleaning (no water, dry brush), batteries ( rechargable, insertion/removal, toxicity, low-battery signal), aid insertion/removal, user booklet, warranty information, storage cases, and other hearing aid details. The patient confirmed understanding of hearing aid use and care, and showed proper insertion of hearing aid and batteries while in the office today. Ms. Bill reported good volume and sound quality today.    EAR(S) FIT: Binaural  MA HEARING AID MODEL NAME:  Carey Vang B50-R  MA HEARING AID MODEL NUMBER: [id number]  HEARING AID STYLE: BTE  EARMOLDS/TIP/ LINK: #1 xS with medium open dome right and small power                                                            dome in the left ear  SERIAL NUMBERS: Right: [id number]; Left: [id number]  WARRANTY END DATE: 8/1/2021    CHARGES:   Dispensing Fee: Binaural, , $500.00  Hearing Aid Digital: Binaural, BTE,     ASSESSMENT: Binaural hearing aid fitting completed today.  Verification measures were performed. The 45 day trial period was explained to patient, and they expressed understanding. Ms. Bill signed the Hearing Aid Purchase Agreement and was given a copy, as well as details on her hearing aids.Patient was counseled that exact out of pocket amounts cannot be determined for hearing aid claims being sent to insurance. Any insurance coverage information presented to the patient is an estimate only, and is not a guarantee of payment. Patient has been advised to check with their own insurance.    PLAN: Ms. Bill will return for follow-up in 2-3 weeks for a hearing aid review appointment. Please call this clinic with questions regarding today s appointment.    Rhiannon SARGENT-FIDEL, #[id number]

## 2018-05-30 ENCOUNTER — HOSPITAL ENCOUNTER (OUTPATIENT)
Dept: PHYSICAL THERAPY | Facility: CLINIC | Age: 53
Setting detail: THERAPIES SERIES
End: 2018-05-30
Attending: FAMILY MEDICINE
Payer: COMMERCIAL

## 2018-05-30 DIAGNOSIS — F33.1 MAJOR DEPRESSIVE DISORDER, RECURRENT EPISODE, MODERATE (H): ICD-10-CM

## 2018-05-30 PROCEDURE — 97140 MANUAL THERAPY 1/> REGIONS: CPT | Mod: GP | Performed by: REHABILITATION PRACTITIONER

## 2018-05-30 PROCEDURE — 40000099 ZZH STATISTIC LYMPHEDEMA VISIT: Performed by: REHABILITATION PRACTITIONER

## 2018-05-30 NOTE — TELEPHONE ENCOUNTER
"TRAZODONE 150 MG TAB TEVA        Last Written Prescription Date:  5/2/18  Last Fill Quantity: 30,   # refills: 0  Last Office Visit: 5/16/18  Future Office visit:    Next 5 appointments (look out 90 days)     May 31, 2018  1:40 PM CDT   SHORT with Kd Leong MD   [facility]    [address]  [facility] [address]   [phone]            Jun 08, 2018  9:30 AM CDT   Return Visit with Kd Morris   [facility] ([facility])    [address] [address]   [phone]            Jun 12, 2018  2:00 PM CDT   Return Visit with Hannah Middleton   [facility]    [address] [address]   [phone]            Jun 18, 2018  9:30 AM CDT   Return Visit with Kd Morris   [facility] ([facility])    [address] [address]   [phone]                   Requested Prescriptions   Pending Prescriptions Disp Refills     traZODone (DESYREL) 150 MG tablet [Pharmacy Med Name: TRAZODONE 150 MG    TAB TEVA] 30 tablet 0     Sig: TAKE ONE TABLET BY MOUTH NIGHTLY AT BEDTIME    Serotonin Modulators Passed    5/30/2018  8:52 AM       Passed - Recent (12 mo) or future (30 days) visit within the authorizing provider's specialty    Patient had office visit in the last 12 months or has a visit in the next 30 days with authorizing provider or within the authorizing provider's specialty.  See \"Patient Info\" tab in inbasket, or \"Choose Columns\" in Meds & Orders section of the refill encounter.           Passed - Patient is age 18 or older       Passed - No active pregnancy on record       Passed - No positive pregnancy test in past 12 months          "

## 2018-05-30 NOTE — PROGRESS NOTES
Outpatient Physical Therapy Progress Note     Patient: Bia Bill  : 1965    Beginning/End Dates of Reporting Period:  2018 to 2018    Referring Provider: Dr. Dang MD    Therapy Diagnosis: BLE secondary lymphedema, chronic      Client Self Report: feel like the L leg still is swelling and painful in knee    Objective Measurements:  Objective Measure: girth  Details: R LE-3.9%, L LE -1.6%     Outcome Measures (most recent score):   LLIS = 50 on date of initial evaluation; pt will again complete LLIS at time of discharge     Goals:  Goal Identifier stg   Goal Description pt to have around the clock tolerance to BLE GCB for edema reduction response   Target Date 05/10/18   Date Met  18   Progress:     Goal Identifier ltg   Goal Description once appropriate, pt and/or friend able to demo independence with donning, doffing and care of comperssion garments for longterm edema management for maintenance   Target Date 18   Date Met  18   Progress:     Goal Identifier ltg   Goal Description pt to be independent with BLE edema management longterm via exercise, elevation, skin cares and compression garment wear/use   Target Date 18   Date Met      Progress:     Goal Identifier ltg   Goal Description pt to have at least 8 point improvement on LLIS due to decreased edema, pain and tightness in BLEs   Target Date 18   Date Met      Progress:     Progress Toward Goals:   Fairly good reductions made in BLE edema since initial evaluation on 18.  New compression garments have been ordered and fit to patient.  Patient is wearing BLE compression garments for maintenance and anticipate a few additional sessions to ensure that current compression garments and wear schedule are adequate in achieving longterm edema management for maintenance.      Plan:  Continue therapy per current plan of care.    Discharge:  No

## 2018-05-30 NOTE — ADDENDUM NOTE
Encounter addended by: Joseline Santana, PT on: 5/30/2018  9:07 AM<BR>     Actions taken: Flowsheet accepted, Sign clinical note

## 2018-05-31 ENCOUNTER — RADIANT APPOINTMENT (OUTPATIENT)
Dept: GENERAL RADIOLOGY | Facility: CLINIC | Age: 53
End: 2018-05-31
Attending: FAMILY MEDICINE
Payer: COMMERCIAL

## 2018-05-31 ENCOUNTER — OFFICE VISIT (OUTPATIENT)
Dept: FAMILY MEDICINE | Facility: CLINIC | Age: 53
End: 2018-05-31
Payer: COMMERCIAL

## 2018-05-31 VITALS
HEIGHT: 59 IN | BODY MASS INDEX: 46.61 KG/M2 | TEMPERATURE: 98.1 F | SYSTOLIC BLOOD PRESSURE: 113 MMHG | WEIGHT: 231.2 LBS | OXYGEN SATURATION: 94 % | HEART RATE: 80 BPM | DIASTOLIC BLOOD PRESSURE: 73 MMHG

## 2018-05-31 DIAGNOSIS — M25.561 ARTHRALGIA OF BOTH KNEES: Primary | ICD-10-CM

## 2018-05-31 DIAGNOSIS — F33.1 MAJOR DEPRESSIVE DISORDER, RECURRENT EPISODE, MODERATE (H): ICD-10-CM

## 2018-05-31 DIAGNOSIS — G25.81 RESTLESS LEG SYNDROME: ICD-10-CM

## 2018-05-31 DIAGNOSIS — E78.00 HYPERCHOLESTEREMIA: ICD-10-CM

## 2018-05-31 DIAGNOSIS — M25.562 ARTHRALGIA OF BOTH KNEES: ICD-10-CM

## 2018-05-31 DIAGNOSIS — G63 POLYNEUROPATHY IN OTHER DISEASES CLASSIFIED ELSEWHERE (H): Chronic | ICD-10-CM

## 2018-05-31 DIAGNOSIS — M25.561 ARTHRALGIA OF BOTH KNEES: ICD-10-CM

## 2018-05-31 DIAGNOSIS — M25.562 ARTHRALGIA OF BOTH KNEES: Primary | ICD-10-CM

## 2018-05-31 PROCEDURE — 99214 OFFICE O/P EST MOD 30 MIN: CPT | Performed by: FAMILY MEDICINE

## 2018-05-31 PROCEDURE — 73565 X-RAY EXAM OF KNEES: CPT | Mod: FY

## 2018-05-31 RX ORDER — PRAMIPEXOLE DIHYDROCHLORIDE 0.12 MG/1
0.125-0.25 TABLET ORAL AT BEDTIME
Qty: 60 TABLET | Refills: 1 | Status: SHIPPED | OUTPATIENT
Start: 2018-05-31 | End: 2018-07-29

## 2018-05-31 RX ORDER — TRAZODONE HYDROCHLORIDE 150 MG/1
150 TABLET ORAL AT BEDTIME
Qty: 30 TABLET | Refills: 0 | Status: SHIPPED | OUTPATIENT
Start: 2018-05-31 | End: 2018-07-30

## 2018-05-31 RX ORDER — SIMVASTATIN 20 MG
TABLET ORAL
Qty: 90 TABLET | Refills: 1 | Status: SHIPPED | OUTPATIENT
Start: 2018-05-31 | End: 2018-12-04

## 2018-05-31 RX ORDER — GABAPENTIN 300 MG/1
CAPSULE ORAL
Qty: 90 CAPSULE | Refills: 2 | Status: SHIPPED | OUTPATIENT
Start: 2018-05-31 | End: 2019-05-22

## 2018-05-31 RX ORDER — TRAZODONE HYDROCHLORIDE 150 MG/1
TABLET ORAL
Qty: 30 TABLET | Refills: 0 | Status: SHIPPED | OUTPATIENT
Start: 2018-05-31 | End: 2018-05-31

## 2018-05-31 ASSESSMENT — PAIN SCALES - GENERAL: PAINLEVEL: MODERATE PAIN (4)

## 2018-05-31 NOTE — MR AVS SNAPSHOT
After Visit Summary   5/31/2018    Bia Bill    MRN: [id number]           Patient Information     Date Of Birth          1965        Visit Information        Provider Department      5/31/2018 1:40 PM Kd Leong MD [facility]        Today's Diagnoses     Arthralgia of both knees    -  1    Polyneuropathy in other diseases classified elsewhere (H)        Hypercholesteremia        Major depressive disorder, recurrent episode, moderate (H)        Restless leg syndrome           Follow-ups after your visit        Additional Services     ORTHO  REFERRAL       [facility] is referring you to the Orthopedic  Services at [facility].       The  Representative will assist you in the coordination of your Orthopedic and Musculoskeletal Care as prescribed by your physician.    The  Representative will call you within 1 business day to help schedule your appointment, or you may contact the  Representative at:    All areas ~ [phone]     Type of Referral : Non Surgical     Wants to consider synvisc.      Timeframe requested: Routine    Coverage of these services is subject to the terms and limitations of your health insurance plan.  Please call member services at your health plan with any benefit or coverage questions.      If X-rays, CT or MRI's have been performed, please contact the facility where they were done to arrange for , prior to your scheduled appointment.  Please bring this referral request to your appointment and present it to your specialist.                  Your next 10 appointments already scheduled     Jun 08, 2018  9:30 AM CDT   Return Visit with Kd Morris   [facility] ([facility])    [address] [address]   [phone]            Jun 12, 2018  1:00 PM CDT   Lymphedema Treatment with Joseline Santana, PT   [facility] ([facility])    [address] [address]   [phone]            Jun 12, 2018  2:00 PM CDT   [facility] with Hannah Middleton   [facility]    [address] [address]   [phone]            Jun 18, 2018  9:30 AM CDT   Return Visit with Kd Morris   [facility] ([facility])    [address] [address]   [phone]            Jun 22, 2018  1:00 PM CDT   LAB with [facility] ([facility])    [address] [address]   [phone]           Please do not eat 10-12 hours before your appointment if you are coming in fasting for labs on lipids, cholesterol, or glucose (sugar). This does not apply to pregnant women. Water, hot tea and black coffee (with nothing added) are okay. Do not drink other fluids, diet soda or chew gum.            Sep 21, 2018  1:00 PM CDT   LAB with [facility] ([facility])    [address] [address]   [phone]           Please do not eat 10-12 hours before your appointment if you are coming in fasting for labs on lipids, cholesterol, or glucose (sugar). This does not apply to pregnant women. Water, hot tea and black coffee (with nothing added) are okay. Do not drink other fluids, diet soda or chew gum.            Sep 28, 2018  1:30 PM CDT   Return Visit with Joshua Watts MD   [facility] ([facility])    [facility]  [address]  [facility] [address]   [phone]              Who to contact     Normal or non-critical lab and imaging results will be communicated to you by MyChart, letter or phone within 4 business days after the clinic has received the results. If you do not hear from us within 7 days, please contact the clinic through  "QR Artisthart or phone. If you have a critical or abnormal lab result, we will notify you by phone as soon as possible.  Submit refill requests through [employer] or call your pharmacy and they will forward the refill request to us. Please allow 3 business days for your refill to be completed.          If you need to speak with a  for additional information , please call: [phone]             Additional Information About Your Visit        [employer]zingo Information     [employer]o lets you send messages to your doctor, view your test results, renew your prescriptions, schedule appointments and more. To sign up, go to [email or web address] . Click on \"Log in\" on the left side of the screen, which will take you to the Welcome page. Then click on \"Sign up Now\" on the right side of the page.     You will be asked to enter the access code listed below, as well as some personal information. Please follow the directions to create your username and password.     Your access code is: [email or web address]  Expires: 2018  1:56 PM     Your access code will  in 90 days. If you need help or a new code, please call your [facility] clinic or [phone].        Care EveryWhere ID     This is your Care EveryWhere ID. This could be used by other organizations to access your [facility] medical records  [id number]        Your Vitals Were     Pulse Temperature Height Last Period Pulse Oximetry BMI (Body Mass Index)    80 98.1  F (36.7  C) (Tympanic) 4' 11\" (1.499 m) 2009 94% 46.7 kg/m2       Blood Pressure from Last 3 Encounters:   18 113/73   18 124/70   18 145/71    Weight from Last 3 Encounters:   18 231 lb 3.2 oz (104.9 kg)   18 232 lb 1.6 oz (105.3 kg)   18 231 lb 12.8 oz (105.1 kg)              We Performed the Following     ORTHO  REFERRAL          Today's Medication Changes          These changes are accurate as of 18  2:02 PM.  If you have any questions, ask your " nurse or doctor.               Start taking these medicines.        Dose/Directions    pramipexole 0.125 MG tablet   Commonly known as:  MIRAPEX   Used for:  Restless leg syndrome   Started by:  Kd Leong MD        Dose:  0.125-0.25 mg   Take 1-2 tablets (0.125-0.25 mg) by mouth At Bedtime   Quantity:  60 tablet   Refills:  1         These medicines have changed or have updated prescriptions.        Dose/Directions    traZODone 150 MG tablet   Commonly known as:  DESYREL   This may have changed:  See the new instructions.   Used for:  Major depressive disorder, recurrent episode, moderate (H)   Changed by:  Kd Leong MD        Dose:  150 mg   Take 1 tablet (150 mg) by mouth At Bedtime   Quantity:  30 tablet   Refills:  0            Where to get your medicines      These medications were sent to [id number] - [place], [place] - [address]  [address] [address]    Hours:  Closed 10-16-08 business to [facility] Phone:  [phone]     gabapentin 300 MG capsule    pramipexole 0.125 MG tablet    simvastatin 20 MG tablet    traZODone 150 MG tablet                Primary Care Provider Office Phone # Fax #    Kd Leong [phone] [address] [address]        Equal Access to Services     [facility] SUREKHA AH: Hadii jose ku hadasho Soomaali, waaxda luqadaha, qaybta kaalmada adeegyada, waxay clare craig. So [facility] [phone].    ATENCIÓN: Si habla español, tiene a terry disposición servicios gratuitos de asistencia lingüística. Llowen al [phone].    We comply with applicable federal civil rights laws and [place] laws. We do not discriminate on the basis of race, color, national origin, age, disability, sex, sexual orientation, or gender identity.            Thank you!     Thank you for choosing [facility]  for your care. Our goal is always to provide you with excellent care. Hearing back from our  patients is one way we can continue to improve our services. Please take a few minutes to complete the written survey that you may receive in the mail after your visit with us. Thank you!             Your Updated Medication List - Protect others around you: Learn how to safely use, store and throw away your medicines at www.disposemymeds.org.          This list is accurate as of 5/31/18  2:02 PM.  Always use your most recent med list.                   Brand Name Dispense Instructions for use Diagnosis    acetaminophen 500 MG tablet    TYLENOL     Take 500-1,000 mg by mouth every 6 hours as needed for mild pain        * albuterol 108 (90 Base) MCG/ACT Inhaler    PROAIR HFA/PROVENTIL HFA/VENTOLIN HFA    3 Inhaler    Inhale 2 puffs into the lungs every 6 hours as needed for shortness of breath / dyspnea or wheezing    Mild persistent asthma without complication       * albuterol (2.5 MG/3ML) 0.083% neb solution     1 Box    Take 1 vial (2.5 mg) by nebulization every 6 hours as needed for shortness of breath / dyspnea or wheezing    Mild persistent asthma without complication       allopurinol 100 MG tablet    ZYLOPRIM    90 tablet    TAKE ONE TABLET BY MOUTH ONE TIME DAILY    Acute gouty arthritis       ARIPiprazole 5 MG tablet    ABILIFY    180 tablet    Take 1.5 tablets (7.5 mg) by mouth At Bedtime    Major depressive disorder, recurrent episode, moderate (H)       buPROPion 150 MG 24 hr tablet    WELLBUTRIN XL    90 tablet    TAKE  ONE TABLET BY MOUTH EVERY DAY IN THE MORNING    COPD exacerbation (H)       EPINEPHrine 0.3 MG/0.3ML injection 2-pack    EPIPEN/ADRENACLICK/or ANY BX GENERIC EQUIV    0.6 mL    Inject 0.3 mLs (0.3 mg) into the muscle once as needed for anaphylaxis    Anaphylactic reaction to bee sting, undetermined intent, subsequent encounter       furosemide 20 MG tablet    LASIX    180 tablet    Take 2 tablets (40 mg) by mouth 2 times daily    Lymphedema of both lower extremities       * gabapentin  600 MG tablet    NEURONTIN    90 tablet    Take 1 tablet (600 mg) by mouth 3 times daily    Polyneuropathy in other diseases classified elsewhere (H)       * gabapentin 300 MG capsule    NEURONTIN    90 capsule    ONE CAPSULE BY MOUTH AT BEDTIME WITH 600 MG CAPSULE- FOR TOTAL  MG AT BEDTIME    Polyneuropathy in other diseases classified elsewhere (H)       ipratropium - albuterol 0.5 mg/2.5 mg/3 mL 0.5-2.5 (3) MG/3ML neb solution    DUONEB    30 vial    Take 1 vial (3 mLs) by nebulization every 6 hours as needed for shortness of breath / dyspnea or wheezing    Chronic obstructive pulmonary disease, unspecified COPD type (H)       lamoTRIgine 200 MG tablet    LaMICtal    90 tablet    Take 1 tablet (200 mg) by mouth daily    Major depressive disorder, recurrent episode, moderate (H)       levothyroxine 150 MCG tablet    SYNTHROID/LEVOTHROID    90 tablet    Take 1 tablet (150 mcg) by mouth daily    Hypothyroidism, unspecified type       metoclopramide 10 MG tablet    REGLAN    40 tablet    Take 1 tablet (10 mg) by mouth 4 times daily as needed    Flatulence, eructation, and gas pain       mometasone-formoterol 200-5 MCG/ACT oral inhaler    DULERA    13 g    Inhale 2 puffs into the lungs 2 times daily    COPD exacerbation (H), Chronic obstructive pulmonary disease, unspecified COPD type (H)       omeprazole 20 MG CR capsule    priLOSEC    180 capsule    TAKE ONE CAPSULE BY MOUTH TWICE DAILY    Gastroesophageal reflux disease without esophagitis       ondansetron 4 MG tablet    ZOFRAN    10 tablet    Take 1 tablet (4 mg) by mouth every 8 hours as needed for nausea    Nausea and vomiting, intractability of vomiting not specified, unspecified vomiting type       * order for DME      Equipment being ordered: CPAP AIRSENSE 10 5-18 CM H20 # [id number]   DN# [id number]        * order for DME     1 Month    Equipment being ordered: INCONTINENCE PADS  QID    Other urinary incontinence       * order for DME     60 Month     "Equipment being ordered: DEPENDS SIZE LARGE    Mixed incontinence       * order for DME     2 Units    Equipment being ordered: [id number]\" x2pair   \"20-30mmHg Farrow Hybrid Liners\" x 2 pair    Peripheral edema       order for DME     1 Device    Equipment being ordered: Nebulizer    COPD exacerbation (H), Chronic obstructive pulmonary disease, unspecified COPD type (H)       order for DME     1 each    Equipment being ordered:x2 [employer] 30/40mmHg compression wraps with x2 extra prs of compression liners    Secondary lymphedema       potassium chloride SA 10 MEQ CR tablet    K-DUR/KLOR-CON M    30 tablet    Take 1 tablet (10 mEq) by mouth daily    Lymphedema       pramipexole 0.125 MG tablet    MIRAPEX    60 tablet    Take 1-2 tablets (0.125-0.25 mg) by mouth At Bedtime    Restless leg syndrome       simvastatin 20 MG tablet    ZOCOR    90 tablet    TAKE ONE TABLET BY MOUTH AT BEDTIME    Hypercholesteremia       traZODone 150 MG tablet    DESYREL    30 tablet    Take 1 tablet (150 mg) by mouth At Bedtime    Major depressive disorder, recurrent episode, moderate (H)       warfarin 5 MG tablet    COUMADIN    180 tablet    Take 12.5 mg on Wed; 10 mg all other days or as directed by the Anticoagulation Clinic    DVT (deep venous thrombosis) (H)       * Notice:  This list has 8 medication(s) that are the same as other medications prescribed for you. Read the directions carefully, and ask your doctor or other care provider to review them with you.      "

## 2018-05-31 NOTE — PROGRESS NOTES
SUBJECTIVE:                                                    Bia Bill is a 52 year old female who presents to clinic today for the following health issues:    Chief Complaint   Patient presents with     Follow Up For     lymphedema     Lymphedema is much better, but has pain in both knees particularly when she has been walking for a long time.  She also has restless legs where her legs will just kind of twitch that is mildly improved but still affecting her sleep she is asking to increase her sleep medication which I recommended.  Problem list and histories reviewed & adjusted, as indicated.  Additional history:     Patient Active Problem List   Diagnosis     Mild persistent asthma     Polyneuropathy in other diseases classified elsewhere (H)     DVT (deep venous thrombosis) (H)     Alcohol abuse, in remission     SECONDARY POLYCYTHEMIA     Chondromalacia of patella     Sensorineural hearing loss, asymmetrical     Developmental reading disorder     Esophageal reflux     Hypothyroidism     Fatty liver     GILES (Obstructive Sleep Apnea)-Moderate (AHI 16)     RLS (restless legs syndrome)     Smoker     Erythrocytosis     Moderate mixed bipolar I disorder (H)     Personality disorder, depressive     COPD (chronic obstructive pulmonary disease) (H)     Current use of long term anticoagulation     Rosacea     Health Care Home     DDD (degenerative disc disease), cervical     Benign neoplasm of colon (POLYPOSIS)     Stroke (H)     Somatization disorder     Sebaceous cyst of right axilla     HTN, goal below 140/90     Left leg pain     Vitamin D deficiency     Long term current use of anticoagulant therapy     Morbid obesity (H)     PVD (peripheral vascular disease) with claudication (H)     Cyst of left ovary     Morbid obesity with alveolar hypoventilation (H)     GI bleed     Severe episode of recurrent major depressive disorder (H)     Conductive hearing loss of left ear with restricted hearing of right ear      Sensorineural hearing loss (SNHL) of right ear with restricted hearing of left ear     Past Surgical History:   Procedure Laterality Date     BILIARY STENT SINGLE USE COV      3 stints in left leg     BONE MARROW BIOPSY, BONE SPECIMEN, NEEDLE/TROCAR N/A 11/17/2014    Procedure: BIOPSY BONE MARROW;  Surgeon: Hay Aaron MD;  Location: [facility]     D & C  10/26/09    with uterine ablation     ESOPHAGOSCOPY, GASTROSCOPY, DUODENOSCOPY (EGD), COMBINED  4/21/2014    Procedure: Gastroscopy;  Surgeon: Moris Thomas MD;  Location: [facility]     HYSTERECTOMY, PAP NO LONGER INDICATED  1-4-2010     LITHOTRIPSY  2004    Lithotrypsy       Social History   Substance Use Topics     Smoking status: Current Every Day Smoker     Packs/day: 0.50     Years: 34.00     Types: Cigarettes     Smokeless tobacco: Never Used      Comment: started at age 11.  Quit during pregnancyX4.  Quit 3 months ago.      Alcohol use No      Comment: quit 1995     Family History   Problem Relation Age of Onset     Hypertension Mother      DIABETES Mother      Blood Disease Mother      HEART DISEASE Mother      chf     CEREBROVASCULAR DISEASE Mother      Hypertension Father      CANCER Father      lung cancer     Allergies Sister      DIABETES Sister      Depression Sister      Hypertension Sister            ROS:  Constitutional, HEENT, cardiovascular, pulmonary, gi and gu systems are negative, except as otherwise noted.    OBJECTIVE:                                                    LMP 09/27/2009 There is no height or weight on file to calculate BMI.   GENERAL: healthy, alert, well nourished, well hydrated, no distress  HENT: ear canals- normal; TMs- normal; Nose- normal; Mouth- no ulcers, no lesions  NECK: no tenderness, no adenopathy, no asymmetry, no masses, no stiffness; thyroid- normal to palpation  RESP: lungs clear to auscultation - no rales, no rhonchi, no wheezes  CV: regular rates and rhythm, normal S1 S2, no S3 or S4 and no  murmur, no click or rub -  ABDOMEN: soft, no tenderness, no  hepatosplenomegaly, no masses, normal bowel sounds    Knees: intact to varus, valgus, anterior, posterior strain.  No effusion present.  negative joint line tenderness. Faye's negative.   Q angle is normal.    Depression of patella does not reproduce pain.  Crepitous absence.          ASSESSMENT/PLAN:                                                      (M25.561,  M25.562) Arthralgia of both knees  (primary encounter diagnosis)  Comment: Most likely osteoarthritis and degenerative joint disease.  Discussed Synvisc again her x-rays is not bone-on-bone yet she is interested in that.  She walks will work on weight loss  Plan: XR Knee AP Standing Bilateral, ORTHO          REFERRAL          (G63) Polyneuropathy in other diseases classified elsewhere (H)    Plan: gabapentin (NEURONTIN) 300 MG capsule            (E78.00) Hypercholesteremia  Comment: Good control.  Continue currant medications, continue to monito   Plan: simvastatin (ZOCOR) 20 MG tablet    (F33.1) Major depressive disorder, recurrent episode, moderate (H)  Comment: Do not take the first time she takes her Mirapex.  Then if she does not sleep well she will take 50 mg of trazodone with her Mirapex and can slowly increase 250 mg if she is not sleeping not having side effects.Plan: traZODone (DESYREL) 150 MG tablet            (G25.81) Restless leg syndrome  Plan: pramipexole (MIRAPEX) 0.125 MG tablet           reports that she has been smoking Cigarettes.  She has a 17.00 pack-year smoking history. She has never used smokeless tobacco.  Tobacco Cessation Action Plan: Pharmacotherapies : cold turkey    Weight management plan: Discussed healthy diet and exercise guidelines and patient will follow up in 6 months in clinic to re-evaluate.      [facility]

## 2018-05-31 NOTE — TELEPHONE ENCOUNTER
Routing refill request to provider for review/approval because:  Drug not on the [facility], [facility] or [facility] refill protocol or controlled substance  Aubree Richardson RN

## 2018-06-12 ENCOUNTER — OFFICE VISIT (OUTPATIENT)
Dept: AUDIOLOGY | Facility: CLINIC | Age: 53
End: 2018-06-12
Payer: COMMERCIAL

## 2018-06-12 ENCOUNTER — HOSPITAL ENCOUNTER (OUTPATIENT)
Dept: PHYSICAL THERAPY | Facility: CLINIC | Age: 53
Setting detail: THERAPIES SERIES
End: 2018-06-12
Attending: FAMILY MEDICINE
Payer: COMMERCIAL

## 2018-06-12 DIAGNOSIS — H90.A12 CONDUCTIVE HEARING LOSS OF LEFT EAR WITH RESTRICTED HEARING OF RIGHT EAR: Primary | ICD-10-CM

## 2018-06-12 DIAGNOSIS — F33.1 MAJOR DEPRESSIVE DISORDER, RECURRENT EPISODE, MODERATE (H): ICD-10-CM

## 2018-06-12 DIAGNOSIS — G63 POLYNEUROPATHY IN OTHER DISEASES CLASSIFIED ELSEWHERE (H): Chronic | ICD-10-CM

## 2018-06-12 DIAGNOSIS — H90.A21 SENSORINEURAL HEARING LOSS (SNHL) OF RIGHT EAR WITH RESTRICTED HEARING OF LEFT EAR: ICD-10-CM

## 2018-06-12 PROCEDURE — V5299 HEARING SERVICE: HCPCS | Performed by: AUDIOLOGIST

## 2018-06-12 PROCEDURE — 97140 MANUAL THERAPY 1/> REGIONS: CPT | Mod: GP | Performed by: PHYSICAL THERAPIST

## 2018-06-12 PROCEDURE — 99207 ZZC NO CHARGE LOS: CPT | Performed by: AUDIOLOGIST

## 2018-06-12 PROCEDURE — 40000099 ZZH STATISTIC LYMPHEDEMA VISIT: Performed by: PHYSICAL THERAPIST

## 2018-06-12 NOTE — PROGRESS NOTES
AUDIOLOGY REPORT    SUBJECTIVE:Bia Bill is a 52 year old female who was seen in the Audiology Clinic at the [facility] on 6/12/2018  for a follow-up check regarding the fitting of new hearing aids. Previous results have revealed an asymmetrical hearing loss.  The patient has been seen previously in this clinic and was fit with Phonak Audeo B50-R hearing aids on 5/24/2018.  Bia reports good sound quality with the hearing aid(s). Patient reports she is hearing much better with her new hearing aids versus her previous ones.    OBJECTIVE:   The International Outcome Inventory-Hearing Aids (IOI-HA) was administered today.The patient s responses to the 7 questions can be compared to normative data relative to how others are performing with their hearing aids, as well as focusing audiologic care and counseling.This patient s Quality of Life score (Question 7) was 5, which is above normative average.     Based on patient report, the following changes were made;none.    Reviewed 45 day trial period, care, cleaning (no water, dry brush), batteries (rechargable) insertion/removal, toxicity, low-battery signal), aid insertion/removal, volume adjustment (if applicable), user booklet, warranty information, storage cases, and other hearing aid details.          ASSESSMENT: A follow-up appointment for hearing aid fitting was completed today. IOI-HA administered today. Changes to hearing aid was completed as outlined above.     PLAN:Bia will return for follow-up as needed, or at least every 9-12 months for cleaning and assessment of hearing aid.  . Please call this clinic with any questions regarding today s appointment.    Rhiannon KINSEY, #[id number]

## 2018-06-12 NOTE — PROGRESS NOTES
Outpatient Physical Therapy Discharge Note     Patient: Bia Bill  : 1965    Beginning/End Dates of Reporting Period:  2018 to 2018    Referring Provider: Dr. Dang MD    Therapy Diagnosis: BLE secondary lymphedema, chronic      Client Self Report: the legs are doing really well, I do exactly what you girls tell me to do    Objective Measurements:  Objective Measure: girth  Details: since last session on 18: RLE -6.0% and LLE +0.2% and since initial evaluation : RLE -20.6% and LLE -21.5%     Outcome Measures (most recent score):  Lymphedema Life Impact Scale (score range 0-72). A higher score indicates greater impairment.: 16    Goals:  Goal Identifier stg   Goal Description pt to have around the clock tolerance to BLE GCB for edema reduction response   Target Date 05/10/18   Date Met  18   Progress:     Goal Identifier ltg   Goal Description once appropriate, pt and/or friend able to demo independence with donning, doffing and care of comperssion garments for longterm edema management for maintenance   Target Date 18   Date Met  18   Progress:     Goal Identifier ltg   Goal Description pt to be independent with BLE edema management longterm via exercise, elevation, skin cares and compression garment wear/use   Target Date 18   Date Met  18   Progress:     Goal Identifier ltg   Goal Description pt to have at least 8 point improvement on LLIS due to decreased edema, pain and tightness in BLEs   Target Date 18   Date Met  18   Progress:     Progress Toward Goals:   Lymphedema therapy goals met      Plan:  Discharge from therapy.    Discharge:    Reason for Discharge: Patient has met all goals.    Equipment Issued: 30-40mmHg knee hi velcro wraps (BiaCare Compreflex Lite - leg piece only)    Discharge Plan: Patient to continue home program.

## 2018-06-12 NOTE — MR AVS SNAPSHOT
After Visit Summary   6/12/2018    Bia Bill    MRN: [id number]           Patient Information     Date Of Birth          1965        Visit Information        Provider Department      6/12/2018 2:00 PM Rhiannon Fong [facility]        Today's Diagnoses     Conductive hearing loss of left ear with restricted hearing of right ear    -  1    Sensorineural hearing loss (SNHL) of right ear with restricted hearing of left ear           Follow-ups after your visit        Your next 10 appointments already scheduled     Jun 13, 2018  9:20 AM CDT   New Visit with Rochelle Nicholas MD   [facility] ([facility])    [address]  [address]  [facility] [address]   [phone]            Jun 18, 2018  9:30 AM CDT   Return Visit with Kd Morris   [facility] ([facility])    [address] [facility] [address]   [phone]            Jun 22, 2018  1:00 PM CDT   LAB with [facility] ([facility])    [address] [facility] [address]   [phone]           Please do not eat 10-12 hours before your appointment if you are coming in fasting for labs on lipids, cholesterol, or glucose (sugar). This does not apply to pregnant women. Water, hot tea and black coffee (with nothing added) are okay. Do not drink other fluids, diet soda or chew gum.            Sep 21, 2018  1:00 PM CDT   LAB with [facility] ([facility])    [address] [address] [facility] [address]   [phone]           Please do not eat 10-12 hours before your appointment if you are coming in fasting for labs on lipids, cholesterol, or glucose (sugar). This does not apply to pregnant women. Water, hot tea and black coffee (with nothing added) are okay. Do not drink other fluids, diet soda or chew gum.            Sep 28, 2018  1:30 PM CDT   Return  "Visit with Joshua Watts MD   [facility] ([facility])    [facility]  [address]  [facility] [address] [phone]              Who to contact     If you have questions or need follow up information about today's clinic visit or your schedule please contact [facility] directly at [phone].  Normal or non-critical lab and imaging results will be communicated to you by Flayrhart, letter or phone within 4 business days after the clinic has received the results. If you do not hear from us within 7 days, please contact the clinic through Flayrhart or phone. If you have a critical or abnormal lab result, we will notify you by phone as soon as possible.  Submit refill requests through [employer] or call your pharmacy and they will forward the refill request to us. Please allow 3 business days for your refill to be completed.          Additional Information About Your Visit        FlayrharTareasPlus Information     [employer] lets you send messages to your doctor, view your test results, renew your prescriptions, schedule appointments and more. To sign up, go to [email or web address] . Click on \"Log in\" on the left side of the screen, which will take you to the Welcome page. Then click on \"Sign up Now\" on the right side of the page.     You will be asked to enter the access code listed below, as well as some personal information. Please follow the directions to create your username and password.     Your access code is: [email or web address]  Expires: 2018  1:56 PM     Your access code will  in 90 days. If you need help or a new code, please call your [facility] clinic or [phone].        Care EveryWhere ID     This is your Care EveryWhere ID. This could be used by other organizations to access your [facility] medical records  [id number]        Your Vitals Were     Last Period                   2009            Blood Pressure from Last 3 " Encounters:   05/31/18 113/73   05/16/18 124/70   05/03/18 145/71    Weight from Last 3 Encounters:   05/31/18 231 lb 3.2 oz (104.9 kg)   05/16/18 232 lb 1.6 oz (105.3 kg)   05/03/18 231 lb 12.8 oz (105.1 kg)              We Performed the Following     HEARING AID CHECK/NO CHARGE        Primary Care Provider Office Phone # Fax #    Kd Leong [phone] [address] [facility] [address]        Equal Access to Services     [facility]: Hadii aad ku hadasho Soomaali, waaxda luqadaha, qaybta kaalmada adeuzair, nitin roberts . So [facility] [phone].    ATENCIÓN: Si habla español, tiene a terry disposición servicios gratuitos de asistencia lingüística. [facility] [phone].    We comply with applicable federal civil rights laws and [place] laws. We do not discriminate on the basis of race, color, national origin, age, disability, sex, sexual orientation, or gender identity.            Thank you!     Thank you for choosing [facility]  for your care. Our goal is always to provide you with excellent care. Hearing back from our patients is one way we can continue to improve our services. Please take a few minutes to complete the written survey that you may receive in the mail after your visit with us. Thank you!             Your Updated Medication List - Protect others around you: Learn how to safely use, store and throw away your medicines at www.disposemymeds.org.          This list is accurate as of 6/12/18  2:53 PM.  Always use your most recent med list.                   Brand Name Dispense Instructions for use Diagnosis    acetaminophen 500 MG tablet    TYLENOL     Take 500-1,000 mg by mouth every 6 hours as needed for mild pain        * albuterol 108 (90 Base) MCG/ACT Inhaler    PROAIR HFA/PROVENTIL HFA/VENTOLIN HFA    3 Inhaler    Inhale 2 puffs into the lungs every 6 hours as needed for shortness of breath / dyspnea or wheezing    Mild  persistent asthma without complication       * albuterol (2.5 MG/3ML) 0.083% neb solution     1 Box    Take 1 vial (2.5 mg) by nebulization every 6 hours as needed for shortness of breath / dyspnea or wheezing    Mild persistent asthma without complication       allopurinol 100 MG tablet    ZYLOPRIM    90 tablet    TAKE ONE TABLET BY MOUTH ONE TIME DAILY    Acute gouty arthritis       ARIPiprazole 5 MG tablet    ABILIFY    180 tablet    Take 1.5 tablets (7.5 mg) by mouth At Bedtime    Major depressive disorder, recurrent episode, moderate (H)       buPROPion 150 MG 24 hr tablet    WELLBUTRIN XL    90 tablet    TAKE  ONE TABLET BY MOUTH EVERY DAY IN THE MORNING    COPD exacerbation (H)       EPINEPHrine 0.3 MG/0.3ML injection 2-pack    EPIPEN/ADRENACLICK/or ANY BX GENERIC EQUIV    0.6 mL    Inject 0.3 mLs (0.3 mg) into the muscle once as needed for anaphylaxis    Anaphylactic reaction to bee sting, undetermined intent, subsequent encounter       furosemide 20 MG tablet    LASIX    180 tablet    Take 2 tablets (40 mg) by mouth 2 times daily    Lymphedema of both lower extremities       * gabapentin 600 MG tablet    NEURONTIN    90 tablet    Take 1 tablet (600 mg) by mouth 3 times daily    Polyneuropathy in other diseases classified elsewhere (H)       * gabapentin 300 MG capsule    NEURONTIN    90 capsule    ONE CAPSULE BY MOUTH AT BEDTIME WITH 600 MG CAPSULE- FOR TOTAL  MG AT BEDTIME    Polyneuropathy in other diseases classified elsewhere (H)       ipratropium - albuterol 0.5 mg/2.5 mg/3 mL 0.5-2.5 (3) MG/3ML neb solution    DUONEB    30 vial    Take 1 vial (3 mLs) by nebulization every 6 hours as needed for shortness of breath / dyspnea or wheezing    Chronic obstructive pulmonary disease, unspecified COPD type (H)       lamoTRIgine 200 MG tablet    LaMICtal    90 tablet    Take 1 tablet (200 mg) by mouth daily    Major depressive disorder, recurrent episode, moderate (H)       levothyroxine 150 MCG tablet     "SYNTHROID/LEVOTHROID    90 tablet    Take 1 tablet (150 mcg) by mouth daily    Hypothyroidism, unspecified type       metoclopramide 10 MG tablet    REGLAN    40 tablet    Take 1 tablet (10 mg) by mouth 4 times daily as needed    Flatulence, eructation, and gas pain       mometasone-formoterol 200-5 MCG/ACT oral inhaler    DULERA    13 g    Inhale 2 puffs into the lungs 2 times daily    COPD exacerbation (H), Chronic obstructive pulmonary disease, unspecified COPD type (H)       omeprazole 20 MG CR capsule    priLOSEC    180 capsule    TAKE ONE CAPSULE BY MOUTH TWICE DAILY    Gastroesophageal reflux disease without esophagitis       ondansetron 4 MG tablet    ZOFRAN    10 tablet    Take 1 tablet (4 mg) by mouth every 8 hours as needed for nausea    Nausea and vomiting, intractability of vomiting not specified, unspecified vomiting type       * order for DME      Equipment being ordered: CPAP AIRSENSE 10 5-18 CM H20 # [id number]   DN# [id number]        * order for DME     1 Month    Equipment being ordered: INCONTINENCE PADS  QID    Other urinary incontinence       * order for DME     60 Month    Equipment being ordered: DEPENDS SIZE LARGE    Mixed incontinence       * order for DME     2 Units    Equipment being ordered: AyermyOpiv5525\" x2pair   \"20-30mmHg Farrow Hybrid Liners\" x 2 pair    Peripheral edema       order for DME     1 Device    Equipment being ordered: Nebulizer    COPD exacerbation (H), Chronic obstructive pulmonary disease, unspecified COPD type (H)       order for DME     1 each    Equipment being ordered:x2 [employer] 30/40mmHg compression wraps with x2 extra prs of compression liners    Secondary lymphedema       potassium chloride SA 10 MEQ CR tablet    K-DUR/KLOR-CON M    30 tablet    Take 1 tablet (10 mEq) by mouth daily    Lymphedema       pramipexole 0.125 MG tablet    MIRAPEX    60 tablet    Take 1-2 tablets (0.125-0.25 mg) by mouth At Bedtime    Restless leg syndrome       simvastatin 20 MG tablet "    ZOCOR    90 tablet    TAKE ONE TABLET BY MOUTH AT BEDTIME    Hypercholesteremia       traZODone 150 MG tablet    DESYREL    30 tablet    Take 1 tablet (150 mg) by mouth At Bedtime    Major depressive disorder, recurrent episode, moderate (H)       warfarin 5 MG tablet    COUMADIN    180 tablet    Take 12.5 mg on Wed; 10 mg all other days or as directed by the Anticoagulation Clinic    DVT (deep venous thrombosis) (H)       * Notice:  This list has 8 medication(s) that are the same as other medications prescribed for you. Read the directions carefully, and ask your doctor or other care provider to review them with you.

## 2018-06-13 ENCOUNTER — RADIANT APPOINTMENT (OUTPATIENT)
Dept: GENERAL RADIOLOGY | Facility: CLINIC | Age: 53
End: 2018-06-13
Attending: PEDIATRICS
Payer: COMMERCIAL

## 2018-06-13 ENCOUNTER — TELEPHONE (OUTPATIENT)
Dept: ORTHOPEDICS | Facility: CLINIC | Age: 53
End: 2018-06-13

## 2018-06-13 ENCOUNTER — OFFICE VISIT (OUTPATIENT)
Dept: ORTHOPEDICS | Facility: CLINIC | Age: 53
End: 2018-06-13
Payer: COMMERCIAL

## 2018-06-13 VITALS
WEIGHT: 232 LBS | HEIGHT: 58 IN | BODY MASS INDEX: 48.7 KG/M2 | SYSTOLIC BLOOD PRESSURE: 138 MMHG | DIASTOLIC BLOOD PRESSURE: 77 MMHG

## 2018-06-13 DIAGNOSIS — G89.29 CHRONIC PAIN OF BOTH KNEES: Primary | ICD-10-CM

## 2018-06-13 DIAGNOSIS — G89.29 CHRONIC PAIN OF BOTH KNEES: ICD-10-CM

## 2018-06-13 DIAGNOSIS — M25.562 CHRONIC PAIN OF BOTH KNEES: ICD-10-CM

## 2018-06-13 DIAGNOSIS — M17.0 ARTHRITIS OF BOTH KNEES: ICD-10-CM

## 2018-06-13 DIAGNOSIS — M22.2X1 PATELLOFEMORAL PAIN SYNDROME OF BOTH KNEES: ICD-10-CM

## 2018-06-13 DIAGNOSIS — M25.561 CHRONIC PAIN OF BOTH KNEES: ICD-10-CM

## 2018-06-13 DIAGNOSIS — M25.562 CHRONIC PAIN OF BOTH KNEES: Primary | ICD-10-CM

## 2018-06-13 DIAGNOSIS — M25.561 CHRONIC PAIN OF BOTH KNEES: Primary | ICD-10-CM

## 2018-06-13 DIAGNOSIS — M22.2X2 PATELLOFEMORAL PAIN SYNDROME OF BOTH KNEES: ICD-10-CM

## 2018-06-13 PROCEDURE — 73564 X-RAY EXAM KNEE 4 OR MORE: CPT | Mod: RT

## 2018-06-13 PROCEDURE — 99243 OFF/OP CNSLTJ NEW/EST LOW 30: CPT | Performed by: PEDIATRICS

## 2018-06-13 RX ORDER — GABAPENTIN 600 MG/1
TABLET ORAL
Qty: 90 TABLET | Refills: 0 | Status: SHIPPED | OUTPATIENT
Start: 2018-06-13 | End: 2018-07-15

## 2018-06-13 RX ORDER — ARIPIPRAZOLE 5 MG/1
TABLET ORAL
Qty: 45 TABLET | Refills: 2 | Status: SHIPPED | OUTPATIENT
Start: 2018-06-13 | End: 2018-08-31

## 2018-06-13 NOTE — PATIENT INSTRUCTIONS
Plan:  - Today's Plan of Care:  Referral for US guided knee injection with Dr Roger Medel. [phone]  Rehab: [facility]: [facility] - [phone]    -We also discussed other future treatment options:  Referral to orthopedic Surgeon or Synvisc One injection of bilateral knee    Follow Up: as needed    If you have any further questions for your physician or physician s care team you can call [phone] and use option 3 to leave a voice message. Calls received during business hours will be returned same day.

## 2018-06-13 NOTE — MR AVS SNAPSHOT
After Visit Summary   6/13/2018    Bia Bill    MRN: [id number]           Patient Information     Date Of Birth          1965        Visit Information        Provider Department      6/13/2018 9:20 AM Rochelle Nicholas MD [facility]        Today's Diagnoses     Chronic pain of both knees    -  1    Arthritis of both knees        Patellofemoral instability of both knees with pain          Care Instructions        Plan:  - Today's Plan of Care:  Referral for US guided knee injection with Dr Roger Medel. [phone]  Rehab: Physical Therapy: [facility] - [phone]    -We also discussed other future treatment options:  Referral to orthopedic Surgeon or Synvisc One injection of bilateral knee    Follow Up: as needed    If you have any further questions for your physician or physician s care team you can call [phone] and use option 3 to leave a voice message. Calls received during business hours will be returned same day.              Follow-ups after your visit        Additional Services     ORTHO  REFERRAL       [facility] is referring you to the Orthopedic  Services at [facility] [place].       The  Representative will assist you in the coordination of your Orthopedic and Musculoskeletal Care as prescribed by your physician.    The  Representative will call you within 1 business day to help schedule your appointment, or you may contact the  Representative at:    All areas ~ [phone]     Type of Referral : Non Surgical US guided injection with Dr Roger Medel      Timeframe requested: Routine    Coverage of these services is subject to the terms and limitations of your health insurance plan.  Please call member services at your health plan with any benefit or coverage questions.      If X-rays, CT or MRI's have been performed, please contact the facility where they  "were done to arrange for , prior to your scheduled appointment.  Please bring this referral request to your appointment and present it to your specialist.            PHYSICAL THERAPY REFERRAL       *This therapy referral will be filtered to a centralized scheduling office at [facility] and the patient will receive a call to schedule an appointment at a [place] location most convenient for them. *     [facility] provides Physical Therapy evaluation and treatment and many specialty services across the [place] system.  If requesting a specialty program, please choose from the list below.    If you have not heard from the scheduling office within 2 business days, please call [phone] for all locations, with the exception of [place], please call [phone] and [facility], please call [phone]  Treatment: Evaluation & Treatment  Special Instructions/Modalities: evaluate and treat  Special Programs: None    Please be aware that coverage of these services is subject to the terms and limitations of your health insurance plan.  Call member services at your health plan with any benefit or coverage questions.      **Note to Provider:  If you are referring outside of [place] for the therapy appointment, please list the name of the location in the \"special instructions\" above, print the referral and give to the patient to schedule the appointment.                  Your next 10 appointments already scheduled     Jun 18, 2018  9:30 AM CDT   Return Visit with Kd Morris   [facility] ([facility])    [address] [address]   [phone]            Jun 22, 2018  1:00 PM CDT   LAB with [facility] ([facility])    [address] [address]   [phone]           Please do not eat 10-12 hours before your appointment if you are coming in fasting for labs on " "lipids, cholesterol, or glucose (sugar). This does not apply to pregnant women. Water, hot tea and black coffee (with nothing added) are okay. Do not drink other fluids, diet soda or chew gum.            Sep 21, 2018  1:00 PM CDT   LAB with [facility] ([facility])    [address]  [facility] [address]   [phone]           Please do not eat 10-12 hours before your appointment if you are coming in fasting for labs on lipids, cholesterol, or glucose (sugar). This does not apply to pregnant women. Water, hot tea and black coffee (with nothing added) are okay. Do not drink other fluids, diet soda or chew gum.            Sep 28, 2018  1:30 PM CDT   Return Visit with Joshua Watts MD   [facility] ([facility])    [facility]  [address]  [facility] [place] [address]   [phone]              Who to contact     If you have questions or need follow up information about today's clinic visit or your schedule please contact [facility] directly at [phone].  Normal or non-critical lab and imaging results will be communicated to you by MyChart, letter or phone within 4 business days after the clinic has received the results. If you do not hear from us within 7 days, please contact the clinic through MyChart or phone. If you have a critical or abnormal lab result, we will notify you by phone as soon as possible.  Submit refill requests through [employer] or call your pharmacy and they will forward the refill request to us. Please allow 3 business days for your refill to be completed.          Additional Information About Your Visit        Care EveryWhere ID     This is your Care EveryWhere ID. This could be used by other organizations to access your [facility] medical records  [id number]        Your Vitals Were     Height Last Period BMI (Body Mass Index)             4' 10\" (1.473 m) " 09/27/2009 48.49 kg/m2          Blood Pressure from Last 3 Encounters:   06/13/18 138/77   05/31/18 113/73   05/16/18 124/70    Weight from Last 3 Encounters:   06/13/18 232 lb (105.2 kg)   05/31/18 231 lb 3.2 oz (104.9 kg)   05/16/18 232 lb 1.6 oz (105.3 kg)              We Performed the Following     ORTHO  REFERRAL     PHYSICAL THERAPY REFERRAL        Primary Care Provider Office Phone # Fax #    Kd Leong [phone] [address] [facility] [address]        Equal Access to Services     [facility] Center[facility]UNCION : Hadii jose Clark, wahoada barrera, qaybta kaalmada obed, nitin roberts . So [facility] [phone].    ATENCIÓN: Si habla español, tiene a terry disposición servicios gratuitos de asistencia lingüística. [facility] [phone].    We comply with applicable federal civil rights laws and [place] laws. We do not discriminate on the basis of race, color, national origin, age, disability, sex, sexual orientation, or gender identity.            Thank you!     Thank you for choosing [facility]  for your care. Our goal is always to provide you with excellent care. Hearing back from our patients is one way we can continue to improve our services. Please take a few minutes to complete the written survey that you may receive in the mail after your visit with us. Thank you!             Your Updated Medication List - Protect others around you: Learn how to safely use, store and throw away your medicines at www.disposemymeds.org.          This list is accurate as of 6/13/18 10:00 AM.  Always use your most recent med list.                   Brand Name Dispense Instructions for use Diagnosis    acetaminophen 500 MG tablet    TYLENOL     Take 500-1,000 mg by mouth every 6 hours as needed for mild pain        * albuterol 108 (90 Base) MCG/ACT Inhaler    PROAIR HFA/PROVENTIL HFA/VENTOLIN HFA    3 Inhaler    Inhale 2 puffs  into the lungs every 6 hours as needed for shortness of breath / dyspnea or wheezing    Mild persistent asthma without complication       * albuterol (2.5 MG/3ML) 0.083% neb solution     1 Box    Take 1 vial (2.5 mg) by nebulization every 6 hours as needed for shortness of breath / dyspnea or wheezing    Mild persistent asthma without complication       allopurinol 100 MG tablet    ZYLOPRIM    90 tablet    TAKE ONE TABLET BY MOUTH ONE TIME DAILY    Acute gouty arthritis       ARIPiprazole 5 MG tablet    ABILIFY    180 tablet    Take 1.5 tablets (7.5 mg) by mouth At Bedtime    Major depressive disorder, recurrent episode, moderate (H)       buPROPion 150 MG 24 hr tablet    WELLBUTRIN XL    90 tablet    TAKE  ONE TABLET BY MOUTH EVERY DAY IN THE MORNING    COPD exacerbation (H)       EPINEPHrine 0.3 MG/0.3ML injection 2-pack    EPIPEN/ADRENACLICK/or ANY BX GENERIC EQUIV    0.6 mL    Inject 0.3 mLs (0.3 mg) into the muscle once as needed for anaphylaxis    Anaphylactic reaction to bee sting, undetermined intent, subsequent encounter       furosemide 20 MG tablet    LASIX    180 tablet    Take 2 tablets (40 mg) by mouth 2 times daily    Lymphedema of both lower extremities       * gabapentin 600 MG tablet    NEURONTIN    90 tablet    Take 1 tablet (600 mg) by mouth 3 times daily    Polyneuropathy in other diseases classified elsewhere (H)       * gabapentin 300 MG capsule    NEURONTIN    90 capsule    ONE CAPSULE BY MOUTH AT BEDTIME WITH 600 MG CAPSULE- FOR TOTAL  MG AT BEDTIME    Polyneuropathy in other diseases classified elsewhere (H)       ipratropium - albuterol 0.5 mg/2.5 mg/3 mL 0.5-2.5 (3) MG/3ML neb solution    DUONEB    30 vial    Take 1 vial (3 mLs) by nebulization every 6 hours as needed for shortness of breath / dyspnea or wheezing    Chronic obstructive pulmonary disease, unspecified COPD type (H)       lamoTRIgine 200 MG tablet    LaMICtal    90 tablet    Take 1 tablet (200 mg) by mouth daily     "Major depressive disorder, recurrent episode, moderate (H)       levothyroxine 150 MCG tablet    SYNTHROID/LEVOTHROID    90 tablet    Take 1 tablet (150 mcg) by mouth daily    Hypothyroidism, unspecified type       metoclopramide 10 MG tablet    REGLAN    40 tablet    Take 1 tablet (10 mg) by mouth 4 times daily as needed    Flatulence, eructation, and gas pain       mometasone-formoterol 200-5 MCG/ACT oral inhaler    DULERA    13 g    Inhale 2 puffs into the lungs 2 times daily    COPD exacerbation (H), Chronic obstructive pulmonary disease, unspecified COPD type (H)       omeprazole 20 MG CR capsule    priLOSEC    180 capsule    TAKE ONE CAPSULE BY MOUTH TWICE DAILY    Gastroesophageal reflux disease without esophagitis       ondansetron 4 MG tablet    ZOFRAN    10 tablet    Take 1 tablet (4 mg) by mouth every 8 hours as needed for nausea    Nausea and vomiting, intractability of vomiting not specified, unspecified vomiting type       * order for DME      Equipment being ordered: CPAP AIRSENSE 10 5-18 CM H20 SN# [id number]   DN# [id number]        * order for DME     1 Month    Equipment being ordered: INCONTINENCE PADS  QID    Other urinary incontinence       * order for DME     60 Month    Equipment being ordered: DEPENDS SIZE LARGE    Mixed incontinence       * order for DME     2 Units    Equipment being ordered: [id number]\" x2pair   \"20-30mmHg [place] Hybrid Liners\" x 2 pair    Peripheral edema       order for DME     1 Device    Equipment being ordered: Nebulizer    COPD exacerbation (H), Chronic obstructive pulmonary disease, unspecified COPD type (H)       order for DME     1 each    Equipment being ordered:x2 [employer] 30/40mmHg compression wraps with x2 extra prs of compression liners    Secondary lymphedema       potassium chloride SA 10 MEQ CR tablet    K-DUR/KLOR-CON M    30 tablet    Take 1 tablet (10 mEq) by mouth daily    Lymphedema       pramipexole 0.125 MG tablet    MIRAPEX    60 tablet    Take 1-2 " tablets (0.125-0.25 mg) by mouth At Bedtime    Restless leg syndrome       simvastatin 20 MG tablet    ZOCOR    90 tablet    TAKE ONE TABLET BY MOUTH AT BEDTIME    Hypercholesteremia       traZODone 150 MG tablet    DESYREL    30 tablet    Take 1 tablet (150 mg) by mouth At Bedtime    Major depressive disorder, recurrent episode, moderate (H)       warfarin 5 MG tablet    COUMADIN    180 tablet    Take 12.5 mg on Wed; 10 mg all other days or as directed by the Anticoagulation Clinic    DVT (deep venous thrombosis) (H)       * Notice:  This list has 8 medication(s) that are the same as other medications prescribed for you. Read the directions carefully, and ask your doctor or other care provider to review them with you.

## 2018-06-13 NOTE — PROGRESS NOTES
Sports Medicine Clinic Visit    PCP: Kd Leong BRITTNEY Bill is a 52 year old female who is seen  in consultation at the request of  Kd Leong M.D. presenting with bilateral knee pain.    Injury: Patient reports chronic onset of bilateral knee pain over the past 2 months.  No injury or overuse.  Has tried OTC medications.    Location of Pain: bilateral anterior knee pain  Duration of Pain: 2 month(s)  Rating of Pain at worst: 9/10  Rating of Pain Currently: 6/10  Symptoms are better with: nothing  Symptoms are worse with: sitting, standing and stairs and walking  Additional Features:   Positive: swelling, popping, grinding, locking, instability and weakness   Negative: bruising, catching, paresthesias and numbness  Other evaluation and/or treatments so far consists of: Ice, Heat, Tylenol and Rest  Prior History of related problems: nothing    Social History:     Review of Systems  Skin: no bruising, no swelling  Musculoskeletal: as above  Neurologic: no numbness, paresthesias  Remainder of review of systems is negative including constitutional, CV, pulmonary, GI, except as noted in HPI or medical history.    Patient's current problem list, past medical and surgical history, and family history were reviewed.    Patient Active Problem List   Diagnosis     Mild persistent asthma     Polyneuropathy in other diseases classified elsewhere (H)     DVT (deep venous thrombosis) (H)     Alcohol abuse, in remission     SECONDARY POLYCYTHEMIA     Chondromalacia of patella     Sensorineural hearing loss, asymmetrical     Developmental reading disorder     Esophageal reflux     Hypothyroidism     Fatty liver     GILES (Obstructive Sleep Apnea)-Moderate (AHI 16)     RLS (restless legs syndrome)     Smoker     Erythrocytosis     Moderate mixed bipolar I disorder (H)     Personality disorder, depressive     COPD (chronic obstructive pulmonary disease) (H)     Current use of long term anticoagulation      Rosacea     Health Care Home     DDD (degenerative disc disease), cervical     Benign neoplasm of colon (POLYPOSIS)     Stroke (H)     Somatization disorder     Sebaceous cyst of right axilla     HTN, goal below 140/90     Left leg pain     Vitamin D deficiency     Long term current use of anticoagulant therapy     Morbid obesity (H)     PVD (peripheral vascular disease) with claudication (H)     Cyst of left ovary     Morbid obesity with alveolar hypoventilation (H)     GI bleed     Severe episode of recurrent major depressive disorder (H)     Conductive hearing loss of left ear with restricted hearing of right ear     Sensorineural hearing loss (SNHL) of right ear with restricted hearing of left ear     Past Medical History:   Diagnosis Date     Acromioclavicular joint arthritis 1/25/2017     Acute bronchospasm 8/15/2016     Acute gouty arthritis 8/4/2014     Anxiety state, unspecified      Bipolar I disorder, most recent episode (or current) unspecified      Closed fracture of metatarsal bone 6/5/2007     Depressive disorder, not elsewhere classified      DVT, lower extremity (H)      Headache 10/17/2014     Impingement syndrome of shoulder region 1/25/2017     Polycythemia, secondary     [place] HGB      Right shoulder pain 10/14/2014     Trochanteric bursitis of both hips 1/11/2016     Past Surgical History:   Procedure Laterality Date     BILIARY STENT SINGLE USE COV      3 stints in left leg     BONE MARROW BIOPSY, BONE SPECIMEN, NEEDLE/TROCAR N/A 11/17/2014    Procedure: BIOPSY BONE MARROW;  Surgeon: Hay Aaron MD;  Location: [facility]     D & C  10/26/09    with uterine ablation     ESOPHAGOSCOPY, GASTROSCOPY, DUODENOSCOPY (EGD), COMBINED  4/21/2014    Procedure: Gastroscopy;  Surgeon: Moris Thomas MD;  Location: [facility]     HYSTERECTOMY, PAP NO LONGER INDICATED  1-4-2010     LITHOTRIPSY  2004    Lithotrypsy     Family History   Problem Relation Age of Onset     Hypertension Mother       "DIABETES Mother      Blood Disease Mother      HEART DISEASE Mother      chf     CEREBROVASCULAR DISEASE Mother      Hypertension Father      CANCER Father      lung cancer     Allergies Sister      DIABETES Sister      Depression Sister      Hypertension Sister          Objective  /77 (BP Location: Left arm, Patient Position: Chair, Cuff Size: Adult Large)  Ht 4' 10\" (1.473 m)  Wt 232 lb (105.2 kg)  LMP 09/27/2009  BMI 48.49 kg/m2    GENERAL APPEARANCE: healthy, alert, no distress and over weight   GAIT: NORMAL  SKIN: no suspicious lesions or rashes  HEENT: Sclera clear, anicteric  CV: good peripheral pulses  RESP: Breathing not labored  NEURO: Normal strength and tone, mentation intact and speech normal  PSYCH:  mentation appears normal and affect normal/bright    Bilateral Knee exam    Inspection:      Difficult to appreciate swelling or effusion due to body habitus    Patella:      Normal patellar tracking noted through range of motion bilateral    Tender:      medial patellar border bilateral       lateral patellar border bilateral       medial joint line bilateral       lateral joint line bilateral    Non Tender:      remainder of knee area bilateral    Knee ROM:      Range of motion slightly limited in flexion bilaterally    Strength:      5/5 with knee extension bilateral    Special Tests:     positive (+) Faye bilateral       neg (-) anterior drawer bilateral       neg (-) posterior drawer bilateral       neg (-) varus at 0 deg and 30 deg bilateral       neg (-) valgus at 0 deg and 30 deg bilateral    Gait:      normal    Neurovascular:      2+ peripheral pulses bilaterally and brisk capillary refill       sensation grossly intact    Radiology  I visualized and reviewed these images with the patient  KNEE AP STANDING BILATERAL  5/31/2018 2:04 PM   HISTORY:  Arthralgia of both knees.  COMPARISON: November 24, 2014, evaluation of the right knee.  IMPRESSION: Mild to moderate joint space " narrowing in both medial knee  compartments with minimal narrowing in both lateral knee compartments.  No osteophyte formation. No acute fracture.    Assessment:  1. Chronic pain of both knees    2. Arthritis of both knees    3. Patellofemoral pain syndrome of both knees      Discussed nature of degenerative arthrosis of the knee. Discussed symptom treatment with over-the-counter medications, ice or heat, topical treatments, and rest if needed. Discussed use of sleeve or wrap for comfort. Discussed benefits of exercise and weight loss to reduce pressure at the knee. Discussed injection therapy. Also briefly discussed future consideration of referral to orthopedic surgery for further evaluation and discussion of arthroplasty.    Plan:  - Today's Plan of Care:  Referral for US guided knee corticosteroid injection with Dr Roger Medel. [phone]  Rehab[facility] [phone]    -We also discussed other future treatment options:  Referral to orthopedic Surgeon or Synvisc One injection of bilateral knee    Follow Up: as needed    Concerning signs and symptoms were reviewed.  The patient expressed understanding of this management plan and all questions were answered at this time.    Thanks for the opportunity to participate in the care of this patient, I will keep you updated on their progress.    CC: Kd Nicholas MD CA  Primary Care Sports Medicine  [facility]

## 2018-06-13 NOTE — LETTER
6/13/2018         RE: Bia Bill  [address]  [facility] [address]        Dear Colleague,    Thank you for referring your patient, Bia Bill, to the [facility]. Please see a copy of my visit note below.    Sports Medicine Clinic Visit    PCP: Kd Leong    Bia Bill is a 52 year old female who is seen  in consultation at the request of  Kd Leong M.D. presenting with bilateral knee pain.    Injury: Patient reports chronic onset of bilateral knee pain over the past 2 months.  No injury or overuse.  Has tried OTC medications.    Location of Pain: bilateral anterior knee pain  Duration of Pain: 2 month(s)  Rating of Pain at worst: 9/10  Rating of Pain Currently: 6/10  Symptoms are better with: nothing  Symptoms are worse with: sitting, standing and stairs and walking  Additional Features:   Positive: swelling, popping, grinding, locking, instability and weakness   Negative: bruising, catching, paresthesias and numbness  Other evaluation and/or treatments so far consists of: Ice, Heat, Tylenol and Rest  Prior History of related problems: nothing    Social History:     Review of Systems  Skin: no bruising, no swelling  Musculoskeletal: as above  Neurologic: no numbness, paresthesias  Remainder of review of systems is negative including constitutional, CV, pulmonary, GI, except as noted in HPI or medical history.    Patient's current problem list, past medical and surgical history, and family history were reviewed.    Patient Active Problem List   Diagnosis     Mild persistent asthma     Polyneuropathy in other diseases classified elsewhere (H)     DVT (deep venous thrombosis) (H)     Alcohol abuse, in remission     SECONDARY POLYCYTHEMIA     Chondromalacia of patella     Sensorineural hearing loss, asymmetrical     Developmental reading disorder     Esophageal reflux     Hypothyroidism     Fatty liver     GILES (Obstructive  Sleep Apnea)-Moderate (AHI 16)     RLS (restless legs syndrome)     Smoker     Erythrocytosis     Moderate mixed bipolar I disorder (H)     Personality disorder, depressive     COPD (chronic obstructive pulmonary disease) (H)     Current use of long term anticoagulation     Rosacea     Health Care Home     DDD (degenerative disc disease), cervical     Benign neoplasm of colon (POLYPOSIS)     Stroke (H)     Somatization disorder     Sebaceous cyst of right axilla     HTN, goal below 140/90     Left leg pain     Vitamin D deficiency     Long term current use of anticoagulant therapy     Morbid obesity (H)     PVD (peripheral vascular disease) with claudication (H)     Cyst of left ovary     Morbid obesity with alveolar hypoventilation (H)     GI bleed     Severe episode of recurrent major depressive disorder (H)     Conductive hearing loss of left ear with restricted hearing of right ear     Sensorineural hearing loss (SNHL) of right ear with restricted hearing of left ear     Past Medical History:   Diagnosis Date     Acromioclavicular joint arthritis 1/25/2017     Acute bronchospasm 8/15/2016     Acute gouty arthritis 8/4/2014     Anxiety state, unspecified      Bipolar I disorder, most recent episode (or current) unspecified      Closed fracture of metatarsal bone 6/5/2007     Depressive disorder, not elsewhere classified      DVT, lower extremity (H)      Headache 10/17/2014     Impingement syndrome of shoulder region 1/25/2017     Polycythemia, secondary     [place] HGB      Right shoulder pain 10/14/2014     Trochanteric bursitis of both hips 1/11/2016     Past Surgical History:   Procedure Laterality Date     BILIARY STENT SINGLE USE COV      3 stints in left leg     BONE MARROW BIOPSY, BONE SPECIMEN, NEEDLE/TROCAR N/A 11/17/2014    Procedure: BIOPSY BONE MARROW;  Surgeon: Hay Aaron MD;  Location: [facility]     D & C  10/26/09    with uterine ablation     ESOPHAGOSCOPY, GASTROSCOPY, DUODENOSCOPY  "(EGD), COMBINED  4/21/2014    Procedure: Gastroscopy;  Surgeon: Moris Thomas MD;  Location: [facility]     HYSTERECTOMY, PAP NO LONGER INDICATED  1-4-2010     LITHOTRIPSY  2004    Lithotrypsy     Family History   Problem Relation Age of Onset     Hypertension Mother      DIABETES Mother      Blood Disease Mother      HEART DISEASE Mother      chf     CEREBROVASCULAR DISEASE Mother      Hypertension Father      CANCER Father      lung cancer     Allergies Sister      DIABETES Sister      Depression Sister      Hypertension Sister          Objective  /77 (BP Location: Left arm, Patient Position: Chair, Cuff Size: Adult Large)  Ht 4' 10\" (1.473 m)  Wt 232 lb (105.2 kg)  LMP 09/27/2009  BMI 48.49 kg/m2    GENERAL APPEARANCE: healthy, alert, no distress and over weight   GAIT: NORMAL  SKIN: no suspicious lesions or rashes  HEENT: Sclera clear, anicteric  CV: good peripheral pulses  RESP: Breathing not labored  NEURO: Normal strength and tone, mentation intact and speech normal  PSYCH:  mentation appears normal and affect normal/bright    Bilateral Knee exam    Inspection:      Difficult to appreciate swelling or effusion due to body habitus    Patella:      Normal patellar tracking noted through range of motion bilateral    Tender:      medial patellar border bilateral       lateral patellar border bilateral       medial joint line bilateral       lateral joint line bilateral    Non Tender:      remainder of knee area bilateral    Knee ROM:      Range of motion slightly limited in flexion bilaterally    Strength:      5/5 with knee extension bilateral    Special Tests:     positive (+) Faye bilateral       neg (-) anterior drawer bilateral       neg (-) posterior drawer bilateral       neg (-) varus at 0 deg and 30 deg bilateral       neg (-) valgus at 0 deg and 30 deg bilateral    Gait:      normal    Neurovascular:      2+ peripheral pulses bilaterally and brisk capillary refill       sensation grossly " intact    Radiology  I visualized and reviewed these images with the patient  KNEE AP STANDING BILATERAL  5/31/2018 2:04 PM   HISTORY:  Arthralgia of both knees.  COMPARISON: November 24, 2014, evaluation of the right knee.  IMPRESSION: Mild to moderate joint space narrowing in both medial knee  compartments with minimal narrowing in both lateral knee compartments.  No osteophyte formation. No acute fracture.    Assessment:  1. Chronic pain of both knees    2. Arthritis of both knees    3. Patellofemoral pain syndrome of both knees      Discussed nature of degenerative arthrosis of the knee. Discussed symptom treatment with over-the-counter medications, ice or heat, topical treatments, and rest if needed. Discussed use of sleeve or wrap for comfort. Discussed benefits of exercise and weight loss to reduce pressure at the knee. Discussed injection therapy. Also briefly discussed future consideration of referral to orthopedic surgery for further evaluation and discussion of arthroplasty.    Plan:  - Today's Plan of Care:  Referral for US guided knee corticosteroid injection with Dr Roger Medel. [phone]  Rehab[facility] [phone]    -We also discussed other future treatment options:  Referral to orthopedic Surgeon or Synvisc One injection of bilateral knee    Follow Up: as needed    Concerning signs and symptoms were reviewed.  The patient expressed understanding of this management plan and all questions were answered at this time.    Thanks for the opportunity to participate in the care of this patient, I will keep you updated on their progress.    CC: Kd Nicholas MD CAQ  Primary Care Sports Medicine  [facility]    Again, thank you for allowing me to participate in the care of your patient.        Sincerely,        Rochelle Nicholas MD

## 2018-06-13 NOTE — TELEPHONE ENCOUNTER
Patient LVM requesting that someone call [facility] [employer] to get approval for her to have SynviscOne injection.

## 2018-06-20 DIAGNOSIS — F33.1 MAJOR DEPRESSIVE DISORDER, RECURRENT EPISODE, MODERATE (H): ICD-10-CM

## 2018-06-20 DIAGNOSIS — I89.0 LYMPHEDEMA OF BOTH LOWER EXTREMITIES: ICD-10-CM

## 2018-06-20 RX ORDER — FUROSEMIDE 20 MG
TABLET ORAL
Qty: 60 TABLET | Refills: 0 | Status: SHIPPED | OUTPATIENT
Start: 2018-06-20 | End: 2018-07-05

## 2018-06-20 RX ORDER — LAMOTRIGINE 200 MG/1
TABLET ORAL
Qty: 30 TABLET | Refills: 2 | Status: SHIPPED | OUTPATIENT
Start: 2018-06-20 | End: 2018-09-17

## 2018-06-20 NOTE — TELEPHONE ENCOUNTER
"LAMOTRIGINE 200 MG TAB NORT        Last Written Prescription Date:  5/30/17  Last Fill Quantity: 90,   # refills: 3  Last Office Visit: 5/31/18  Future Office visit:       furosemide (LASIX) 20 MG tablet      Last Written Prescription Date:  5/2/18  Last Fill Quantity: 180,   # refills: 0  Last Office Visit: 5/31/18  Future Office visit:       Requested Prescriptions   Pending Prescriptions Disp Refills     lamoTRIgine (LAMICTAL) 200 MG tablet [Pharmacy Med Name: LAMOTRIGINE 200 MG  TAB NORT] 30 tablet 2     Sig: TAKE 1 TABLET (200 MG) BY MOUTH DAILY    Anti-Seizure Meds Protocol  Failed    6/20/2018 10:20 AM       Failed - Review Authorizing provider's last note.     Refer to last progress notes: confirm request is for original authorizing provider (cannot be through other providers).         Failed - Normal CBC on file in past 26 months    Recent Labs   Lab Test  05/21/18   1029   WBC  4.2   RBC  6.60*   HGB  19.2*   HCT  59.7*   PLT  104*       For [facility] ONLY: [id number] = WBC, [id number] = RBC         Failed - Normal platelet count on file in past 26 months    Recent Labs   Lab Test  05/21/18   1029   PLT  104*              Passed - Recent (12 mo) or future (30 days) visit within the authorizing provider's specialty    Patient had office visit in the last 12 months or has a visit in the next 30 days with authorizing provider or within the authorizing provider's specialty.  See \"Patient Info\" tab in inbasket, or \"Choose Columns\" in Meds & Orders section of the refill encounter.           Passed - Normal ALT or AST on file in past 26 months    Recent Labs   Lab Test  05/16/18   1043   ALT  34     Recent Labs   Lab Test  05/16/18   1043   AST  23            Passed - No active pregnancy on record       Passed - No positive pregnancy test in last 12 months        furosemide (LASIX) 20 MG tablet [Pharmacy Med Name: FUROSEMIDE 20 MG    TAB SOLC] 60 tablet 0     Sig: TAKE 2 TABLETS (40 MG) BY MOUTH 2 TIMES DAILY    Diuretics " "(Including Combos) Protocol Passed    6/20/2018 10:20 AM       Passed - Blood pressure under 140/90 in past 12 months    BP Readings from Last 3 Encounters:   06/13/18 138/77   05/31/18 113/73   05/16/18 124/70                Passed - Recent (12 mo) or future (30 days) visit within the authorizing provider's specialty    Patient had office visit in the last 12 months or has a visit in the next 30 days with authorizing provider or within the authorizing provider's specialty.  See \"Patient Info\" tab in inbasket, or \"Choose Columns\" in Meds & Orders section of the refill encounter.           Passed - Patient is age 18 or older       Passed - No active pregancy on record       Passed - Normal serum creatinine on file in past 12 months    Recent Labs   Lab Test  05/16/18   1043   CR  0.69             Passed - Normal serum potassium on file in past 12 months    Recent Labs   Lab Test  05/16/18   1043   POTASSIUM  3.5                   Passed - Normal serum sodium on file in past 12 months    Recent Labs   Lab Test  05/16/18   1043   NA  139             Passed - No positive pregnancy test in past 12 months          "

## 2018-06-20 NOTE — TELEPHONE ENCOUNTER
Spoke with the patient and informed her that a PA was submitted for the Synvisc One.     Caroline Mac M.Ed., ATR, ATC

## 2018-06-20 NOTE — TELEPHONE ENCOUNTER
[facility] PA form completed and faxed to [facility] for prior approval for Synvisc One injection. Form faxed to [facility] and confirmed sent by [employer] at 12:01 pm.     Forms were also sent to [place] in case form is needed by Dr. Nicholas in the future.     Caroline Mac M.Ed., ATR, ATC

## 2018-06-21 ENCOUNTER — ANTICOAGULATION THERAPY VISIT (OUTPATIENT)
Dept: ANTICOAGULATION | Facility: CLINIC | Age: 53
End: 2018-06-21

## 2018-06-21 DIAGNOSIS — D75.1 ERYTHROCYTOSIS: Chronic | ICD-10-CM

## 2018-06-21 DIAGNOSIS — I82.409 DVT (DEEP VENOUS THROMBOSIS) (H): ICD-10-CM

## 2018-06-21 DIAGNOSIS — I73.9 PERIPHERAL VASCULAR DISEASE WITH CLAUDICATION (H): ICD-10-CM

## 2018-06-21 DIAGNOSIS — Z79.01 LONG TERM CURRENT USE OF ANTICOAGULANT THERAPY: ICD-10-CM

## 2018-06-21 DIAGNOSIS — I73.9 PVD (PERIPHERAL VASCULAR DISEASE) WITH CLAUDICATION (H): ICD-10-CM

## 2018-06-21 LAB
HCT VFR BLD AUTO: 58.1 % (ref 35–47)
HGB BLD-MCNC: 17.9 G/DL (ref 11.7–15.7)
INR PPP: 1.43 (ref 0.86–1.14)

## 2018-06-21 PROCEDURE — 36415 COLL VENOUS BLD VENIPUNCTURE: CPT | Performed by: FAMILY MEDICINE

## 2018-06-21 PROCEDURE — 85610 PROTHROMBIN TIME: CPT | Performed by: FAMILY MEDICINE

## 2018-06-21 PROCEDURE — 99207 ZZC NO CHARGE NURSE ONLY: CPT

## 2018-06-21 PROCEDURE — 85014 HEMATOCRIT: CPT | Performed by: FAMILY MEDICINE

## 2018-06-21 PROCEDURE — 85018 HEMOGLOBIN: CPT | Performed by: FAMILY MEDICINE

## 2018-06-21 NOTE — MR AVS SNAPSHOT
Bia S Bill   6/21/2018   Anticoagulation Therapy Visit    Description:  52 year old female   Provider:  Reina Forrest, RN   [facility]           INR as of 6/21/2018     Today's INR 1.43!      Anticoagulation Summary as of 6/21/2018     INR goal 2.0-2.5   Today's INR 1.43!   Full warfarin instructions 12.5 mg on Wed; 10 mg all other days   Next INR check     Indications   Long term current use of anticoagulant therapy [Z79.01]  DVT (deep venous thrombosis) (H) [I82.409]  PVD (peripheral vascular disease) with claudication (H) [I73.9]         June 2018 Details    Sun Mon Tue Wed Thu Fri Sat          1               2                 3               4               5               6               7               8               9                 10               11               12               13               14               15               16                 17               18               19               20               21      10 mg   See details      22      10 mg         23      10 mg           24      10 mg         25      10 mg         26      10 mg         27      12.5 mg         28      10 mg         29      10 mg         30      10 mg          Date Details   06/21 This INR check      Date of next INR: No date specified         How to take your warfarin dose     To take:  10 mg Take 2 of the 5 mg tablets.    To take:  12.5 mg Take 2.5 of the 5 mg tablets.

## 2018-06-21 NOTE — PROGRESS NOTES
Left message for patient to call back regarding her INR. No dosing was given.     Reina Forrest RN, BSN, PHN  Anticoagulation Clinic   [phone]

## 2018-06-22 NOTE — PROGRESS NOTES
Addendum: Writer left voicemail for patient reminding her to check INR soon. She is overdue and was sub therapeutic at last check.    Mireille Torres RN, BSN, PHN  7-6-2018  ANTICOAGULATION FOLLOW-UP CLINIC VISIT    Patient Name:  Bia Bill  Date:  6/22/2018  Contact Type:  Telephone    SUBJECTIVE:     Patient Findings     Positives Change in diet/appetite    Comments Patient states she is eating more greens. She denies any missed doses. She does not drink protein supplements. I instructed her to take 15 mg today and 12.5 mg tomorrow then to resume her maintenance dose. She stated she would call the lab and make an appointment to have her INR checked next week.            OBJECTIVE    INR   Date Value Ref Range Status   06/21/2018 1.43 (H) 0.86 - 1.14 Final     Comment:     Special tube used to correct for high hematocrit       ASSESSMENT / PLAN  INR assessment SUB    Recheck INR In: 5 DAYS    INR Location Outside lab      Anticoagulation Summary as of 6/21/2018     INR goal 2.0-2.5   Today's INR 1.43!   Warfarin maintenance plan 12.5 mg (5 mg x 2.5) on Wed; 10 mg (5 mg x 2) all other days   Full warfarin instructions 6/22: 15 mg; 6/23: 12.5 mg; Otherwise 12.5 mg on Wed; 10 mg all other days   Weekly warfarin total 72.5 mg   Plan last modified Angelina Nj RN (5/3/2018)   Next INR check 6/27/2018   Priority INR   Target end date Indefinite    Indications   Long term current use of anticoagulant therapy [Z79.01]  DVT (deep venous thrombosis) (H) [I82.409]  PVD (peripheral vascular disease) with claudication (H) [I73.9]         Anticoagulation Episode Summary     INR check location     Preferred lab     Send INR reminders to [facility] PHONE ANTICOAG POOL    Comments * lab draw due to elevated hematocrit (fingerstick meters don't work). LEFT LE. STENT x 3 LEFT UPPER LEG. Previous arterial clot. New goal range 2-2.5 starting 11/6/17.      Anticoagulation Care Providers     Provider Role Specialty Phone number     Kd Leong MD [facility] [phone]            See the Encounter Report to view Anticoagulation Flowsheet and Dosing Calendar (Go to Encounters tab in chart review, and find the Anticoagulation Therapy Visit)        Reina Forrest RN

## 2018-06-25 ENCOUNTER — HOSPITAL ENCOUNTER (OUTPATIENT)
Dept: PHYSICAL THERAPY | Facility: CLINIC | Age: 53
Setting detail: THERAPIES SERIES
End: 2018-06-25
Attending: PEDIATRICS
Payer: COMMERCIAL

## 2018-06-25 PROCEDURE — 97535 SELF CARE MNGMENT TRAINING: CPT | Mod: GP | Performed by: PHYSICAL THERAPIST

## 2018-06-25 PROCEDURE — G8979 MOBILITY GOAL STATUS: HCPCS | Mod: GP,CJ | Performed by: PHYSICAL THERAPIST

## 2018-06-25 PROCEDURE — 97110 THERAPEUTIC EXERCISES: CPT | Mod: GP | Performed by: PHYSICAL THERAPIST

## 2018-06-25 PROCEDURE — 97162 PT EVAL MOD COMPLEX 30 MIN: CPT | Mod: GP | Performed by: PHYSICAL THERAPIST

## 2018-06-25 PROCEDURE — 40000718 ZZHC STATISTIC PT DEPARTMENT ORTHO VISIT: Performed by: PHYSICAL THERAPIST

## 2018-06-25 PROCEDURE — G8978 MOBILITY CURRENT STATUS: HCPCS | Mod: GP,CL | Performed by: PHYSICAL THERAPIST

## 2018-06-25 NOTE — PROGRESS NOTES
06/25/18 1300   General Information   Type of Visit Initial OP Ortho PT Evaluation   Start of Care Date 06/25/18   Referring Physician Rochelle Nicholas MD    Patient/Family Goals Statement reduce knee pain and avoid TKA   Orders Evaluate and Treat   Date of Order 06/13/18   Insurance Type [employer]   Insurance Comments/Visits Authorized [facility] Auth required after eval   Medical Diagnosis Chronic pain of both knees M25.561, M25.562, G89.29  - Primary Arthritis of both knees M17.0 Patellofemoral pain syndrome of both knees M22.2X1, M22.2X2   Surgical/Medical history reviewed Yes   Precautions/Limitations no known precautions/limitations   Body Part(s)   Body Part(s) Knee   Presentation and Etiology   Pertinent history of current problem (include personal factors and/or comorbidities that impact the POC) Pt relates she has had knee pain on/off for years but got worse last in the last 2 months as she had increased leg swelling. Pt relates she was in lymphadema therapy and she now has wraps. Pt will have B knee synvisk injections next month. Pt has not tried knee bracing. Pt relates L knee is worse than R. Pt relates she is having a lot of falls at home, she thinks it is due to her medication. Pt relates she is getting bruising and she blacks out of home. PMH: anemia, asthma, copd/emphysema, depression, weight gain, heart problems, high BP, bi-polar, arthritits, smoking, stroke      Impairments A. Pain;C. Swelling;E. Decreased flexibility;H. Impaired gait;M. Locking or catching   Functional Limitations perform activities of daily living;perform required work activities;perform desired leisure / sports activities   Symptom Location B knees   How/Where did it occur With repetition/overuse   Chronicity Chronic   Pain rating (0-10 point scale) Best (/10);Worst (/10)   Best (/10) 4   Worst (/10) 9   Pain quality A. Sharp;C. Aching   Frequency of pain/symptoms A. Constant   Pain/symptoms exacerbated by A. Sitting;B. Walking;E.  Rest;I. Bending   Pain/symptoms eased by D. Nothing   Progression of symptoms since onset: Unchanged   Current Level of Function   Current Community Support Family/friend caregiver   Patient role/employment history G. Disabled   [facility]   Fall Risk Screen   Fall screen completed by PT   Have you fallen 2 or more times in the past year? Yes   Have you fallen and had an injury in the past year? Yes   Timed Up and Go score (seconds) 18 secs w SEC   Is patient a fall risk? Yes;Department fall risk interventions implemented   Fall screen comments sent inbasket to MD re falls due to being light headed, provided hand outs    Functional Scales   Other Scales  LEFS:36/80   Knee Objective Findings   Side (if bilateral, select both right and left) Right;Left   Observation boggy edema noted B - L worse than R    Gait/Locomotion uses SEC - has walker that she uses 2x/week due to pain and poor balance   Balance/Proprioception (Single Leg Stance) poor - unable to    Anterior Drawer Test -   Posterior Drawer Test -   Varus Stress Test -   Valgus Stress Test -   Faye's Test +   Palpation TTP medial menesicus B   Accessory Motion/Joint Mobility normal patellar tracking    Right Knee Extension PROM 5 deg hyperextension   Right Knee Flexion AROM 110   Right Knee Flexion Strength 3/5   Right Knee Extension Strength 4/5   Right Hip Abduction Strength 2/5   Left Knee Extension AROM 0   Left Knee Extension PROM 95   Left Knee Flexion Strength 3/5   Left Knee Extension Strength 4-/5    Left Hip Abduction Strength 2/5   Planned Therapy Interventions   Planned Therapy Interventions joint mobilization;manual therapy;motor coordination training;neuromuscular re-education;ROM;strengthening;stretching;gait training;balance training   Planned Modality Interventions   Planned Modality Interventions Cryotherapy;Electrical stimulation;TENS   Clinical Impression   Criteria for Skilled Therapeutic Interventions Met yes,  treatment indicated   PT Diagnosis decreased ROM and strength associated with OA   Influenced by the following impairments Pain; weakness; impaired ROM;    Functional limitations due to impairments walking, standing   Clinical Presentation Stable/Uncomplicated   Clinical Presentation Rationale Pt is pleasant 52 yr old female w B knee pain. Pt has complicated PMH   Clinical Decision Making (Complexity) Moderate complexity   Therapy Frequency 2 times/Week   Predicted Duration of Therapy Intervention (days/wks) 8 weeks   Risk & Benefits of therapy have been explained Yes   Patient, Family & other staff in agreement with plan of care Yes   Education Assessment   Preferred Learning Style Listening;Reading;Demonstration;Pictures/video   Barriers to Learning No barriers   ORTHO GOALS   PT Ortho Eval Goals 1;2;4;3   Ortho Goal 1   Goal Identifier ROM  to get in/out of car   Goal Description Pt will demonstrate full ROM to be able to get in/out w less than 1/10 pain   Target Date 07/23/18   Ortho Goal 2   Goal Identifier walking    Goal Description Pt will be able to walk w 30 min w less than 1/10 pain to be able to go grocery shopping   Target Date 07/23/18   Ortho Goal 3   Goal Identifier stairs   Goal Description Pt will be able to ascend/descend 5 steps with less than 1/10 pain in order to demonstrate improve strength and LE function and be able to enter home   Target Date 08/20/18   Ortho Goal 4   Goal Identifier LEFS   Goal Description Pt will score 60/80 on LEFS outcome tool to demonstrate clinically significant improvement and be able to complete more tasks at home   Target Date 08/20/18   Total Evaluation Time   Total Evaluation Time 50 (25 eval, 1 TE, 1 ADL)      Joseline Madera  Physical Therapist  [address] [address]  [email or web address]   [email or web address]   Office: [phone] Fax: [phone]

## 2018-07-02 ENCOUNTER — HOSPITAL ENCOUNTER (OUTPATIENT)
Dept: PHYSICAL THERAPY | Facility: CLINIC | Age: 53
Setting detail: THERAPIES SERIES
End: 2018-07-02
Attending: FAMILY MEDICINE
Payer: COMMERCIAL

## 2018-07-02 DIAGNOSIS — J44.1 COPD EXACERBATION (H): ICD-10-CM

## 2018-07-02 PROCEDURE — 40000718 ZZHC STATISTIC PT DEPARTMENT ORTHO VISIT: Performed by: PHYSICAL THERAPIST

## 2018-07-02 PROCEDURE — 97535 SELF CARE MNGMENT TRAINING: CPT | Mod: GP | Performed by: PHYSICAL THERAPIST

## 2018-07-02 PROCEDURE — 97110 THERAPEUTIC EXERCISES: CPT | Mod: GP | Performed by: PHYSICAL THERAPIST

## 2018-07-02 NOTE — TELEPHONE ENCOUNTER
"BUPROPN HCL X 150MG TAB PARP        Last Written Prescription Date:  4/4/18  Last Fill Quantity: 90,   # refills: 0  Last Office Visit: 5/31/18  Future Office visit:    Next 5 appointments (look out 90 days)     Jul 24, 2018  5:00 PM CDT   Return Visit with Kd Morris   [facility] ([facility])    [address]  [facility] [place], [place] [address]   [phone]            Sep 28, 2018  1:30 PM CDT   Return Visit with Joshua Watts MD   [facility] ([facility])    [facility]  [address]  [facility] [place], [place] [address]   [phone]                   Requested Prescriptions   Pending Prescriptions Disp Refills     buPROPion (WELLBUTRIN XL) 150 MG 24 hr tablet [Pharmacy Med Name: BUPROPN HCL X 150MG TAB PARP] 30 tablet 0     Sig: TAKE  ONE TABLET BY MOUTH EVERY DAY IN THE MORNING    SSRIs Protocol Passed    7/2/2018 11:29 AM       Passed - Recent (12 mo) or future (30 days) visit within the authorizing provider's specialty    Patient had office visit in the last 12 months or has a visit in the next 30 days with authorizing provider or within the authorizing provider's specialty.  See \"Patient Info\" tab in inbasket, or \"Choose Columns\" in Meds & Orders section of the refill encounter.           Passed - Medication is Bupropion    If the medication is Bupropion (Wellbutrin), and the patient is taking for smoking cessation; OK to refill.         Passed - Patient is age 18 or older       Passed - No active pregnancy on record       Passed - No positive pregnancy test in last 12 months          "

## 2018-07-03 RX ORDER — BUPROPION HYDROCHLORIDE 150 MG/1
TABLET ORAL
Qty: 90 TABLET | Refills: 0 | Status: SHIPPED | OUTPATIENT
Start: 2018-07-03 | End: 2018-07-05

## 2018-07-03 NOTE — TELEPHONE ENCOUNTER
Prescription approved per [facility] Refill Protocol.  Mundo Conde RN

## 2018-07-05 ENCOUNTER — HOSPITAL ENCOUNTER (OUTPATIENT)
Dept: PHYSICAL THERAPY | Facility: CLINIC | Age: 53
Setting detail: THERAPIES SERIES
End: 2018-07-05
Attending: FAMILY MEDICINE
Payer: COMMERCIAL

## 2018-07-05 DIAGNOSIS — I89.0 LYMPHEDEMA OF BOTH LOWER EXTREMITIES: ICD-10-CM

## 2018-07-05 DIAGNOSIS — J44.1 COPD EXACERBATION (H): ICD-10-CM

## 2018-07-05 PROCEDURE — 40000718 ZZHC STATISTIC PT DEPARTMENT ORTHO VISIT: Performed by: PHYSICAL THERAPIST

## 2018-07-05 PROCEDURE — 97110 THERAPEUTIC EXERCISES: CPT | Mod: GP | Performed by: PHYSICAL THERAPIST

## 2018-07-05 PROCEDURE — 97535 SELF CARE MNGMENT TRAINING: CPT | Mod: GP | Performed by: PHYSICAL THERAPIST

## 2018-07-05 NOTE — TELEPHONE ENCOUNTER
FUROSEMIDE 20 MG TAB SOLC        Last Written Prescription Date:  6/20/18  Last Fill Quantity: 60,   # refills: 0  Last Office Visit: 5/31/18  Future Office visit:    Next 5 appointments (look out 90 days)     Jul 24, 2018  5:00 PM CDT   Return Visit with Kd Morris   [facility]    [address] [facility] [address]   [phone]            Sep 28, 2018  1:30 PM CDT   Return Visit with Joshua Watts MD   [facility] ([facility])    [facility]  [address] [place] [address]  [facility] [address]   [phone]                   buPROPion (WELLBUTRIN XL) 150 MG 24 hr tablet      Last Written Prescription Date:  7/3/18  Last Fill Quantity: 90,   # refills: 0  Last Office Visit: 5/31/18  Future Office visit:    Next 5 appointments (look out 90 days)     Jul 24, 2018  5:00 PM CDT   Return Visit with Kd Morris   [address] [address]   [phone]            Sep 28, 2018  1:30 PM CDT   Return Visit with Joshua Watts MD   [facility] ([facility])    [facility] [address]  [facility] [address]   [phone]                   Requested Prescriptions   Pending Prescriptions Disp Refills     furosemide (LASIX) 20 MG tablet [Pharmacy Med Name: FUROSEMIDE 20 MG    TAB SOLC] 60 tablet 0     Sig: TAKE TWO TABLETS BY MOUTH TWICE DAILY    Diuretics (Including Combos) Protocol Passed    7/5/2018 10:53 AM       Passed - Blood pressure under 140/90 in past 12 months    BP Readings from Last 3 Encounters:   06/13/18 138/77   05/31/18 113/73   05/16/18 124/70                Passed - Recent (12 mo) or future (30 days) visit within the authorizing provider's specialty    Patient had office visit in the last 12 months or has a visit in the next 30 days with authorizing provider or within  "the authorizing provider's specialty.  See \"Patient Info\" tab in inbasket, or \"Choose Columns\" in Meds & Orders section of the refill encounter.           Passed - Patient is age 18 or older       Passed - No active pregancy on record       Passed - Normal serum creatinine on file in past 12 months    Recent Labs   Lab Test  05/16/18   1043   CR  0.69             Passed - Normal serum potassium on file in past 12 months    Recent Labs   Lab Test  05/16/18   1043   POTASSIUM  3.5                   Passed - Normal serum sodium on file in past 12 months    Recent Labs   Lab Test  05/16/18   1043   NA  139             Passed - No positive pregnancy test in past 12 months        buPROPion (WELLBUTRIN XL) 150 MG 24 hr tablet [Pharmacy Med Name: BUPROPN HCL X 150MG TAB PARP] 30 tablet 0     Sig: TAKE  ONE TABLET BY MOUTH EVERY DAY IN THE MORNING    SSRIs Protocol Passed    7/5/2018 10:53 AM       Passed - Recent (12 mo) or future (30 days) visit within the authorizing provider's specialty    Patient had office visit in the last 12 months or has a visit in the next 30 days with authorizing provider or within the authorizing provider's specialty.  See \"Patient Info\" tab in inbasket, or \"Choose Columns\" in Meds & Orders section of the refill encounter.           Passed - Medication is Bupropion    If the medication is Bupropion (Wellbutrin), and the patient is taking for smoking cessation; OK to refill.         Passed - Patient is age 18 or older       Passed - No active pregnancy on record       Passed - No positive pregnancy test in last 12 months          "

## 2018-07-06 RX ORDER — FUROSEMIDE 20 MG
TABLET ORAL
Qty: 60 TABLET | Refills: 0 | Status: SHIPPED | OUTPATIENT
Start: 2018-07-06 | End: 2018-07-23

## 2018-07-06 RX ORDER — BUPROPION HYDROCHLORIDE 150 MG/1
TABLET ORAL
Qty: 30 TABLET | Refills: 0 | Status: SHIPPED | OUTPATIENT
Start: 2018-07-06 | End: 2018-11-05

## 2018-07-06 NOTE — TELEPHONE ENCOUNTER
Routing refill request to provider for review/approval because:  Last PHQ-9 was 11. Madeleine Mesa RN

## 2018-07-10 ENCOUNTER — ANTICOAGULATION THERAPY VISIT (OUTPATIENT)
Dept: ANTICOAGULATION | Facility: CLINIC | Age: 53
End: 2018-07-10
Payer: COMMERCIAL

## 2018-07-10 ENCOUNTER — OFFICE VISIT (OUTPATIENT)
Dept: ORTHOPEDICS | Facility: CLINIC | Age: 53
End: 2018-07-10
Payer: COMMERCIAL

## 2018-07-10 VITALS
BODY MASS INDEX: 48.28 KG/M2 | WEIGHT: 230 LBS | DIASTOLIC BLOOD PRESSURE: 76 MMHG | SYSTOLIC BLOOD PRESSURE: 130 MMHG | HEIGHT: 58 IN

## 2018-07-10 DIAGNOSIS — Z79.01 LONG TERM CURRENT USE OF ANTICOAGULANT THERAPY: ICD-10-CM

## 2018-07-10 DIAGNOSIS — I73.9 PERIPHERAL VASCULAR DISEASE WITH CLAUDICATION (H): ICD-10-CM

## 2018-07-10 DIAGNOSIS — M17.0 PRIMARY OSTEOARTHRITIS OF BOTH KNEES: ICD-10-CM

## 2018-07-10 DIAGNOSIS — I73.9 PVD (PERIPHERAL VASCULAR DISEASE) WITH CLAUDICATION (H): ICD-10-CM

## 2018-07-10 DIAGNOSIS — G89.29 CHRONIC PAIN OF LEFT KNEE: Primary | ICD-10-CM

## 2018-07-10 DIAGNOSIS — I82.409 DVT (DEEP VENOUS THROMBOSIS) (H): ICD-10-CM

## 2018-07-10 DIAGNOSIS — M25.562 CHRONIC PAIN OF LEFT KNEE: Primary | ICD-10-CM

## 2018-07-10 DIAGNOSIS — D75.1 ERYTHROCYTOSIS: Chronic | ICD-10-CM

## 2018-07-10 LAB
HCT VFR BLD AUTO: 62.8 % (ref 35–47)
HGB BLD-MCNC: 19.2 G/DL (ref 11.7–15.7)
INR PPP: 1.57 (ref 0.86–1.14)

## 2018-07-10 PROCEDURE — 99207 ZZC NO CHARGE NURSE ONLY: CPT

## 2018-07-10 PROCEDURE — 20611 DRAIN/INJ JOINT/BURSA W/US: CPT | Mod: LT | Performed by: FAMILY MEDICINE

## 2018-07-10 PROCEDURE — 85018 HEMOGLOBIN: CPT | Performed by: FAMILY MEDICINE

## 2018-07-10 PROCEDURE — 85014 HEMATOCRIT: CPT | Performed by: FAMILY MEDICINE

## 2018-07-10 PROCEDURE — 36415 COLL VENOUS BLD VENIPUNCTURE: CPT | Performed by: FAMILY MEDICINE

## 2018-07-10 PROCEDURE — 85610 PROTHROMBIN TIME: CPT | Performed by: FAMILY MEDICINE

## 2018-07-10 NOTE — PROGRESS NOTES
7/11/18  Left a second VM to have patient call [facility] back regarding her INR.    Reina Forrest RN, BSN, [facility]  Anticoagulation Clinic   [phone]          Left VM to have patient call [facility] back and confirm the dosing she has been taking and confirm any other changes that may have affected the INR. No dosing instructions have been given.     7/10/18  Reina Forrest RN, BSN, [facility]  Anticoagulation Clinic   [phone]

## 2018-07-10 NOTE — MR AVS SNAPSHOT
After Visit Summary   7/10/2018    Bia Bill    MRN: [id number]           Patient Information     Date Of Birth          1965        Visit Information        Provider Department      7/10/2018 1:40 PM Roger Medel DO [facility]        Today's Diagnoses     Chronic pain of left knee    -  1    Primary osteoarthritis of both knees           Follow-ups after your visit        Your next 10 appointments already scheduled     Jul 24, 2018  5:00 PM CDT   Return Visit with Kd Morris   [facility] ([facility])    [address] [facility] [address]   [phone]            Sep 21, 2018  1:00 PM CDT   LAB with [facility] ([facility])    [address] [facility] [address]   [phone]           Please do not eat 10-12 hours before your appointment if you are coming in fasting for labs on lipids, cholesterol, or glucose (sugar). This does not apply to pregnant women. Water, hot tea and black coffee (with nothing added) are okay. Do not drink other fluids, diet soda or chew gum.            Sep 28, 2018  1:30 PM CDT   Return Visit with Joshua Watts MD   [facility] ([facility])    [facility]  [address] [facility] [address]  [facility] [address]   [phone]              Who to contact     If you have questions or need follow up information about today's clinic visit or your schedule please contact [facility] directly at [phone].  Normal or non-critical lab and imaging results will be communicated to you by MyChart, letter or phone within 4 business days after the clinic has received the results. If you do not hear from us within 7 days, please contact the clinic through MyChart or phone. If you have a critical or abnormal lab result, we will notify you by phone as soon as  "possible.  Submit refill requests through [employer] or call your pharmacy and they will forward the refill request to us. Please allow 3 business days for your refill to be completed.          Additional Information About Your Visit        Care EveryWhere ID     This is your Care EveryWhere ID. This could be used by other organizations to access your [facility] medical records  [id number]        Your Vitals Were     Height Last Period BMI (Body Mass Index)             4' 10\" (1.473 m) 09/27/2009 48.07 kg/m2          Blood Pressure from Last 3 Encounters:   07/10/18 130/76   06/13/18 138/77   05/31/18 113/73    Weight from Last 3 Encounters:   07/10/18 230 lb (104.3 kg)   06/13/18 232 lb (105.2 kg)   05/31/18 231 lb 3.2 oz (104.9 kg)              We Performed the Following     Large Joint Injection/Arthocentesis        Primary Care Provider Office Phone # Fax #    Kd Leong [phone] [address] [facility] [address]        Equal Access to Services     [facility]: Hadii aad ku hadasho Soomaali, waaxda luqadaha, qaybta kaalmada adeegyafrancis, nitin roberts . So [facility] [phone].    ATENCIÓN: Si habla español, tiene a terry disposición servicios gratuitos de asistencia lingüística. Natalie al [phone].    We comply with applicable federal civil rights laws and [place] laws. We do not discriminate on the basis of race, color, national origin, age, disability, sex, sexual orientation, or gender identity.            Thank you!     Thank you for choosing [facility]  for your care. Our goal is always to provide you with excellent care. Hearing back from our patients is one way we can continue to improve our services. Please take a few minutes to complete the written survey that you may receive in the mail after your visit with us. Thank you!             Your Updated Medication List - Protect others around you: Learn how to safely " use, store and throw away your medicines at www.disposemymeds.org.          This list is accurate as of 7/10/18  3:33 PM.  Always use your most recent med list.                   Brand Name Dispense Instructions for use Diagnosis    acetaminophen 500 MG tablet    TYLENOL     Take 500-1,000 mg by mouth every 6 hours as needed for mild pain        * albuterol 108 (90 Base) MCG/ACT Inhaler    PROAIR HFA/PROVENTIL HFA/VENTOLIN HFA    3 Inhaler    Inhale 2 puffs into the lungs every 6 hours as needed for shortness of breath / dyspnea or wheezing    Mild persistent asthma without complication       * albuterol (2.5 MG/3ML) 0.083% neb solution     1 Box    Take 1 vial (2.5 mg) by nebulization every 6 hours as needed for shortness of breath / dyspnea or wheezing    Mild persistent asthma without complication       allopurinol 100 MG tablet    ZYLOPRIM    90 tablet    TAKE ONE TABLET BY MOUTH ONE TIME DAILY    Acute gouty arthritis       ARIPiprazole 5 MG tablet    ABILIFY    45 tablet    TAKE 1.5 TABLETS (7.5 MG) BY MOUTH AT BEDTIME    Major depressive disorder, recurrent episode, moderate (H)       buPROPion 150 MG 24 hr tablet    WELLBUTRIN XL    30 tablet    TAKE  ONE TABLET BY MOUTH EVERY DAY IN THE MORNING    COPD exacerbation (H)       EPINEPHrine 0.3 MG/0.3ML injection 2-pack    EPIPEN/ADRENACLICK/or ANY BX GENERIC EQUIV    0.6 mL    Inject 0.3 mLs (0.3 mg) into the muscle once as needed for anaphylaxis    Anaphylactic reaction to bee sting, undetermined intent, subsequent encounter       furosemide 20 MG tablet    LASIX    60 tablet    TAKE TWO TABLETS BY MOUTH TWICE DAILY    Lymphedema of both lower extremities       * gabapentin 300 MG capsule    NEURONTIN    90 capsule    ONE CAPSULE BY MOUTH AT BEDTIME WITH 600 MG CAPSULE- FOR TOTAL  MG AT BEDTIME    Polyneuropathy in other diseases classified elsewhere (H)       * gabapentin 600 MG tablet    NEURONTIN    90 tablet    TAKE ONE TABLET BY MOUTH THREE TIMES  "DAILY    Polyneuropathy in other diseases classified elsewhere (H)       ipratropium - albuterol 0.5 mg/2.5 mg/3 mL 0.5-2.5 (3) MG/3ML neb solution    DUONEB    30 vial    Take 1 vial (3 mLs) by nebulization every 6 hours as needed for shortness of breath / dyspnea or wheezing    Chronic obstructive pulmonary disease, unspecified COPD type (H)       lamoTRIgine 200 MG tablet    LaMICtal    30 tablet    TAKE 1 TABLET (200 MG) BY MOUTH DAILY    Major depressive disorder, recurrent episode, moderate (H)       levothyroxine 150 MCG tablet    SYNTHROID/LEVOTHROID    90 tablet    Take 1 tablet (150 mcg) by mouth daily    Hypothyroidism, unspecified type       metoclopramide 10 MG tablet    REGLAN    40 tablet    Take 1 tablet (10 mg) by mouth 4 times daily as needed    Flatulence, eructation, and gas pain       mometasone-formoterol 200-5 MCG/ACT oral inhaler    DULERA    13 g    Inhale 2 puffs into the lungs 2 times daily    COPD exacerbation (H), Chronic obstructive pulmonary disease, unspecified COPD type (H)       omeprazole 20 MG CR capsule    priLOSEC    180 capsule    TAKE ONE CAPSULE BY MOUTH TWICE DAILY    Gastroesophageal reflux disease without esophagitis       ondansetron 4 MG tablet    ZOFRAN    10 tablet    Take 1 tablet (4 mg) by mouth every 8 hours as needed for nausea    Nausea and vomiting, intractability of vomiting not specified, unspecified vomiting type       * order for DME      Equipment being ordered: CPAP AIRSENSE 10 5-18 CM H20 # [id number]   DN# [id number]        * order for DME     1 Month    Equipment being ordered: INCONTINENCE PADS  QID    Other urinary incontinence       * order for DME     60 Month    Equipment being ordered: DEPENDS SIZE LARGE    Mixed incontinence       * order for DME     2 Units    Equipment being ordered: [id number]\" x2pair   \"20-30mmHg Farrow Hybrid Liners\" x 2 pair    Peripheral edema       order for DME     1 Device    Equipment being ordered: Nebulizer    COPD " exacerbation (H), Chronic obstructive pulmonary disease, unspecified COPD type (H)       order for DME     1 each    Equipment being ordered:x2 [employer] 30/40mmHg compression wraps with x2 extra prs of compression liners    Secondary lymphedema       potassium chloride SA 10 MEQ CR tablet    K-DUR/KLOR-CON M    30 tablet    Take 1 tablet (10 mEq) by mouth daily    Lymphedema       pramipexole 0.125 MG tablet    MIRAPEX    60 tablet    Take 1-2 tablets (0.125-0.25 mg) by mouth At Bedtime    Restless leg syndrome       simvastatin 20 MG tablet    ZOCOR    90 tablet    TAKE ONE TABLET BY MOUTH AT BEDTIME    Hypercholesteremia       traZODone 150 MG tablet    DESYREL    30 tablet    Take 1 tablet (150 mg) by mouth At Bedtime    Major depressive disorder, recurrent episode, moderate (H)       warfarin 5 MG tablet    COUMADIN    180 tablet    Take 12.5 mg on Wed; 10 mg all other days or as directed by the Anticoagulation Clinic    DVT (deep venous thrombosis) (H)       * Notice:  This list has 8 medication(s) that are the same as other medications prescribed for you. Read the directions carefully, and ask your doctor or other care provider to review them with you.

## 2018-07-10 NOTE — LETTER
7/10/2018         RE: Bia Bill  [address]  [facility] [address]        Dear Colleague,    Thank you for referring your patient, Bia Bill, to the [facility]. Please see a copy of my visit note below.    Bia Bill  :  1965  DOS: 7/10/2018  MRN: [id number]    Sports Medicine Clinic Procedure    Ultrasound Guided Left Intra-Articular Knee SynviscOne Injection    Clinical History: Gradual onset of worsening chronic bilateral knee pain over the last ~ 3 months.  Presents today for bilateral SynviscOne injections at direction of Dr Nicholas.  Patient reports that her right knee pain is improving with physical therapy and would like to pursue left knee only today.    Diagnosis:   1. Chronic pain of left knee    2. Primary osteoarthritis of both knees      Referring Physician: Rochelle Nicholas MD  Large Joint Injection/Arthocentesis  Date/Time: 7/10/2018 2:40 PM  Performed by: COURTNEY IRWIN  Authorized by: COURTNEY IRWIN     Indications:  Osteoarthritis  Guidance: ultrasound    Approach:  Superolateral  Location:  Knee  Site:  L knee joint  Medications:  48 mg hylan 48 MG/6ML  Consent Given by:  Patient  Prep: patient was prepped and draped in usual sterile fashion            Impression:  Successful Left intra-articular knee SynviscOne and injection.    Plan:  Follow up as directed by Dr Nicholas  Expectations and limitations of synvisc were reviewed in detail  Often 4-6 weeks before full effect may be noticed  Usually covered up to every 6 months by insurance, but does not need to be repeated unless pain returns, at which point we would re-evaluate  Potential use of CSI in future for flares of pain reviewed in detail  Encouraged modified progressive pain-free activity as tolerated  HEP and Supportive care reviewed  All questions were answered today  Contact us with additional questions or concerns  Signs and sx of  concern reviewed    Roger Medel DO, YAIR  Primary Care Sports Medicine  [facility]         Again, thank you for allowing me to participate in the care of your patient.        Sincerely,        Roger Medel DO

## 2018-07-10 NOTE — PROGRESS NOTES
Bia Bill  :  1965  DOS: 7/10/2018  MRN: [id number]    Sports Medicine Clinic Procedure    Ultrasound Guided Left Intra-Articular Knee SynviscOne Injection    Clinical History: Gradual onset of worsening chronic bilateral knee pain over the last ~ 3 months.  Presents today for bilateral SynviscOne injections at direction of Dr Nicholas.  Patient reports that her right knee pain is improving with physical therapy and would like to pursue left knee only today.    Diagnosis:   1. Chronic pain of left knee    2. Primary osteoarthritis of both knees      Referring Physician: Rochelle Nicholas MD  Large Joint Injection/Arthocentesis  Date/Time: 7/10/2018 2:40 PM  Performed by: ROGER IRWIN  Authorized by: ROGER IRWIN     Indications:  Osteoarthritis  Guidance: ultrasound    Approach:  Superolateral  Location:  Knee  Site:  L knee joint  Medications:  48 mg hylan 48 MG/6ML  Consent Given by:  Patient  Prep: patient was prepped and draped in usual sterile fashion            Impression:  Successful Left intra-articular knee SynviscOne and injection.    Plan:  Follow up as directed by Dr Nicholas  Expectations and limitations of synvisc were reviewed in detail  Often 4-6 weeks before full effect may be noticed  Usually covered up to every 6 months by insurance, but does not need to be repeated unless pain returns, at which point we would re-evaluate  Potential use of CSI in future for flares of pain reviewed in detail  Encouraged modified progressive pain-free activity as tolerated  HEP and Supportive care reviewed  All questions were answered today  Contact us with additional questions or concerns  Signs and sx of concern reviewed    Roger Irwin DO, CATHONG  Primary Care Sports Medicine  [facility]

## 2018-07-10 NOTE — MR AVS SNAPSHOT
Bia S Bill   7/10/2018   Anticoagulation Therapy Visit    Description:  52 year old female   Provider:  Reina Forrest, RN   [facility]           INR as of 7/10/2018     Today's INR 1.57!      Anticoagulation Summary as of 7/10/2018     INR goal 2.0-2.5   Today's INR 1.57!   Full warfarin instructions 12.5 mg on Wed; 10 mg all other days   Next INR check     Indications   Long term current use of anticoagulant therapy [Z79.01]  DVT (deep venous thrombosis) (H) [I82.409]  PVD (peripheral vascular disease) with claudication (H) [I73.9]         July 2018 Details    Sun Mon Tue Wed Thu Fri Sat     1               2               3               4               5               6               7                 8               9               10      10 mg   See details      11      12.5 mg         12      10 mg         13      10 mg         14      10 mg           15      10 mg         16      10 mg         17      10 mg         18      12.5 mg         19      10 mg         20      10 mg         21      10 mg           22      10 mg         23      10 mg         24      10 mg         25      12.5 mg         26      10 mg         27      10 mg         28      10 mg           29      10 mg         30      10 mg         31      10 mg              Date Details   07/10 This INR check      Date of next INR: No date specified         How to take your warfarin dose     To take:  10 mg Take 2 of the 5 mg tablets.    To take:  12.5 mg Take 2.5 of the 5 mg tablets.

## 2018-07-11 DIAGNOSIS — M10.9 ACUTE GOUTY ARTHRITIS: ICD-10-CM

## 2018-07-11 DIAGNOSIS — I89.0 LYMPHEDEMA: ICD-10-CM

## 2018-07-11 RX ORDER — POTASSIUM CHLORIDE 750 MG/1
TABLET, EXTENDED RELEASE ORAL
Qty: 30 TABLET | Refills: 0 | Status: SHIPPED | OUTPATIENT
Start: 2018-07-11 | End: 2018-08-14

## 2018-07-11 RX ORDER — ALLOPURINOL 100 MG/1
TABLET ORAL
Qty: 30 TABLET | Refills: 1 | Status: SHIPPED | OUTPATIENT
Start: 2018-07-11 | End: 2018-09-12

## 2018-07-11 NOTE — TELEPHONE ENCOUNTER
"POT CL MICRO 10 MEQ TAB SAND        Last Written Prescription Date:  517/18  Last Fill Quantity: 30,   # refills: 1  Last Office Visit: 5/31/18  Future Office visit:    Next 5 appointments (look out 90 days)     Jul 24, 2018  5:00 PM CDT   Return Visit with Kd Morris   [facility] ([facility]    [address] [place] [address]   [phone]            Sep 28, 2018  1:30 PM CDT   Return Visit with Joshua Watts MD   [facility] ([facility])    [facility]  [address] [place] [address]   [phone]                   allopurinol (ZYLOPRIM) 100 MG tablet      Last Written Prescription Date:  10/11/17  Last Fill Quantity: 90,   # refills: 2  Last Office Visit: 5/31/18  Future Office visit:    Next 5 appointments (look out 90 days)     Jul 24, 2018  5:00 PM CDT   Return Visit with Kd Morris   [address] [address]   [phone]            Sep 28, 2018  1:30 PM CDT   Return Visit with Joshua Watts MD   [facility] ([address] [address]   [phone]                   Requested Prescriptions   Pending Prescriptions Disp Refills     potassium chloride SA (K-DUR/KLOR-CON M) 10 MEQ CR tablet [Pharmacy Med Name: POT CL MICRO 10 MEQ TAB SAND] 30 tablet 0     Sig: TAKE ONE TABLET BY MOUTH ONE TIME DAILY    Potassium Supplements Protocol Passed    7/11/2018 12:58 PM       Passed - Recent (12 mo) or future (30 days) visit within the authorizing provider's specialty    Patient had office visit in the last 12 months or has a visit in the next 30 days with authorizing provider or within the authorizing provider's specialty.  See \"Patient Info\" tab in inbasket, or \"Choose Columns\" in Meds & Orders section of the refill encounter.  " "         Passed - Patient is age 18 or older       Passed - Normal serum potassium in past 12 months    Recent Labs   Lab Test  05/16/18   1043   POTASSIUM  3.5                    allopurinol (ZYLOPRIM) 100 MG tablet [Pharmacy Med Name: ALLOPURINOL 100 MG  TAB NORT] 30 tablet 1     Sig: TAKE ONE TABLET BY MOUTH ONE TIME DAILY    Gout Agents Protocol Failed    7/11/2018 12:58 PM       Failed - Has Uric Acid on file in past 12 months and value is less than 6    Recent Labs   Lab Test  11/30/15   1508   URIC  4.5     If level is 6mg/dL or greater, ok to refill one time and refer to provider.          Passed - CBC on file in past 12 months    Recent Labs   Lab Test  07/10/18   1327   05/21/18   1029   WBC   --    --   4.2   RBC   --    --   6.60*   HGB  19.2*   < >  19.2*   HCT  62.8*   < >  59.7*   PLT   --    --   104*    < > = values in this interval not displayed.       For [facility] ONLY: HJGH527 = WBC, THTL681 = RBC         Passed - ALT on file in past 12 months    Recent Labs   Lab Test  05/16/18   1043   ALT  34            Passed - Recent (12 mo) or future (30 days) visit within the authorizing provider's specialty    Patient had office visit in the last 12 months or has a visit in the next 30 days with authorizing provider or within the authorizing provider's specialty.  See \"Patient Info\" tab in inbasket, or \"Choose Columns\" in Meds & Orders section of the refill encounter.           Passed - Patient is age 18 or older       Passed - No active pregnancy on record       Passed - Normal serum creatinine on file in the past 12 months    Recent Labs   Lab Test  05/16/18   1043   CR  0.69            Passed - No positive pregnancy test in past year          "

## 2018-07-12 NOTE — PROGRESS NOTES
ANTICOAGULATION FOLLOW-UP CLINIC VISIT    Patient Name:  Bia Bill  Date:  7/12/2018  Contact Type:  Telephone    SUBJECTIVE:     Patient Findings     Positives Unexplained INR or factor level change    Comments Patient had 72.5mg in the previous 7 days, will increase dose to 80mg by the next INR check in 2 weeks (~10% increase).    Patient denies any missed doses, no changes in vitamin K intake. It is not clear why patient's INR is low today. Due to ride availability patient is not able to return until 7/24 when she is here for another appt. [facility] to call the next day with the INR result. No signs/symptoms of venous thromboembolism or stroke.            OBJECTIVE    INR   Date Value Ref Range Status   07/10/2018 1.57 (H) 0.86 - 1.14 Final     Comment:     Special tube used to correct for high hematocrit       ASSESSMENT / PLAN  INR assessment SUB    Recheck INR In: 2 WEEKS    INR Location Outside lab      Anticoagulation Summary as of 7/10/2018     INR goal 2.0-2.5   Today's INR 1.57!   Warfarin maintenance plan 10 mg (5 mg x 2) on Mon, Wed, Fri; 12.5 mg (5 mg x 2.5) all other days   Full warfarin instructions 10 mg on Mon, Wed, Fri; 12.5 mg all other days   Weekly warfarin total 80 mg   Plan last modified Angelina Nj RN (7/12/2018)   Next INR check 7/25/2018   Priority INR   Target end date Indefinite    Indications   Long term current use of anticoagulant therapy [Z79.01]  DVT (deep venous thrombosis) (H) [I82.409]  PVD (peripheral vascular disease) with claudication (H) [I73.9]         Anticoagulation Episode Summary     INR check location     Preferred lab     Send INR reminders to [place] [facility] POOL    Comments * lab draw due to elevated hematocrit (fingerstick meters don't work). LEFT LE. STENT x 3 LEFT UPPER LEG. Previous arterial clot. New goal range 2-2.5 starting 11/6/17.      Anticoagulation Care Providers     Provider Role Specialty Phone number    Kd Leong MD Responsible  Family Practice [phone]            See the Encounter Report to view Anticoagulation Flowsheet and Dosing Calendar (Go to Encounters tab in chart review, and find the Anticoagulation Therapy Visit)        Angelina Nj RN, CACP

## 2018-07-15 DIAGNOSIS — G63 POLYNEUROPATHY IN OTHER DISEASES CLASSIFIED ELSEWHERE (H): Chronic | ICD-10-CM

## 2018-07-16 ENCOUNTER — HOSPITAL ENCOUNTER (OUTPATIENT)
Dept: PHYSICAL THERAPY | Facility: CLINIC | Age: 53
Setting detail: THERAPIES SERIES
End: 2018-07-16
Attending: FAMILY MEDICINE
Payer: COMMERCIAL

## 2018-07-16 PROCEDURE — 40000718 ZZHC STATISTIC PT DEPARTMENT ORTHO VISIT: Performed by: PHYSICAL THERAPIST

## 2018-07-16 PROCEDURE — 97110 THERAPEUTIC EXERCISES: CPT | Mod: GP | Performed by: PHYSICAL THERAPIST

## 2018-07-16 PROCEDURE — 97535 SELF CARE MNGMENT TRAINING: CPT | Mod: GP | Performed by: PHYSICAL THERAPIST

## 2018-07-16 RX ORDER — GABAPENTIN 600 MG/1
TABLET ORAL
Qty: 90 TABLET | Refills: 0 | Status: SHIPPED | OUTPATIENT
Start: 2018-07-16 | End: 2018-08-14

## 2018-07-16 NOTE — TELEPHONE ENCOUNTER
Routing refill request to provider for review/approval because:  Drug not on the [facility] refill protocol     Bailey Lozano RN

## 2018-07-19 ENCOUNTER — HOSPITAL ENCOUNTER (OUTPATIENT)
Dept: PHYSICAL THERAPY | Facility: CLINIC | Age: 53
Setting detail: THERAPIES SERIES
End: 2018-07-19
Attending: FAMILY MEDICINE
Payer: COMMERCIAL

## 2018-07-19 PROCEDURE — 97110 THERAPEUTIC EXERCISES: CPT | Mod: GP | Performed by: PHYSICAL THERAPIST

## 2018-07-19 PROCEDURE — 40000718 ZZHC STATISTIC PT DEPARTMENT ORTHO VISIT: Performed by: PHYSICAL THERAPIST

## 2018-07-23 ENCOUNTER — TELEPHONE (OUTPATIENT)
Dept: PHARMACY | Facility: CLINIC | Age: 53
End: 2018-07-23

## 2018-07-23 DIAGNOSIS — I89.0 LYMPHEDEMA OF BOTH LOWER EXTREMITIES: ICD-10-CM

## 2018-07-23 NOTE — TELEPHONE ENCOUNTER
This patient is due for MTM follow-up. I called the patient to schedule an appointment. Appointment scheduled for 8/2/18.    Eduardo Marquez, MaldonadoD, [facility]  Medication Therapy Management Pharmacist  Pager: [phone]

## 2018-07-23 NOTE — TELEPHONE ENCOUNTER
"FUROSEMIDE 20 MG TAB SOLC        Last Written Prescription Date:  7/6/18  Last Fill Quantity: 60,   # refills: 0  Last Office Visit: 5/31/18  Future Office visit:    Next 5 appointments (look out 90 days)     Jul 24, 2018  5:00 PM CDT   Return Visit with Kd Morris   [facility] ([facility])    [address]  [place] [address]   [phone]            Aug 02, 2018 12:30 PM CDT   Office Visit with Raulito Marquez [facility] ([facility])    [address] [place] [address]   [phone]            Sep 28, 2018  1:30 PM CDT   Return Visit with Joshua Watts MD   [facility] ([facility])    [facility]  [address]  [facility] [address]   [phone]                   Requested Prescriptions   Pending Prescriptions Disp Refills     furosemide (LASIX) 20 MG tablet [Pharmacy Med Name: FUROSEMIDE 20 MG    TAB SOLC] 60 tablet 0     Sig: TAKE TWO TABLETS BY MOUTH TWICE DAILY    Diuretics (Including Combos) Protocol Passed    7/23/2018 11:27 AM       Passed - Blood pressure under 140/90 in past 12 months    BP Readings from Last 3 Encounters:   07/10/18 130/76   06/13/18 138/77   05/31/18 113/73                Passed - Recent (12 mo) or future (30 days) visit within the authorizing provider's specialty    Patient had office visit in the last 12 months or has a visit in the next 30 days with authorizing provider or within the authorizing provider's specialty.  See \"Patient Info\" tab in inbasket, or \"Choose Columns\" in Meds & Orders section of the refill encounter.           Passed - Patient is age 18 or older       Passed - No active pregancy on record       Passed - Normal serum creatinine on file in past 12 months    Recent Labs   Lab Test  05/16/18   1043   CR  0.69             Passed - Normal serum potassium on file in past 12 months    Recent Labs   Lab " Test  05/16/18   1043   POTASSIUM  3.5                   Passed - Normal serum sodium on file in past 12 months    Recent Labs   Lab Test  05/16/18   1043   NA  139             Passed - No positive pregnancy test in past 12 months

## 2018-07-24 ENCOUNTER — OFFICE VISIT (OUTPATIENT)
Dept: PSYCHOLOGY | Facility: CLINIC | Age: 53
End: 2018-07-24
Payer: COMMERCIAL

## 2018-07-24 DIAGNOSIS — D75.1 ERYTHROCYTOSIS: Chronic | ICD-10-CM

## 2018-07-24 DIAGNOSIS — I73.9 PERIPHERAL VASCULAR DISEASE WITH CLAUDICATION (H): ICD-10-CM

## 2018-07-24 DIAGNOSIS — F31.62 MODERATE MIXED BIPOLAR I DISORDER (H): Primary | ICD-10-CM

## 2018-07-24 DIAGNOSIS — I82.409 DVT (DEEP VENOUS THROMBOSIS) (H): ICD-10-CM

## 2018-07-24 DIAGNOSIS — Z79.01 LONG TERM CURRENT USE OF ANTICOAGULANT THERAPY: ICD-10-CM

## 2018-07-24 LAB
BASOPHILS # BLD AUTO: 0 10E9/L (ref 0–0.2)
BASOPHILS NFR BLD AUTO: 0.6 %
DIFFERENTIAL METHOD BLD: ABNORMAL
EOSINOPHIL # BLD AUTO: 0.1 10E9/L (ref 0–0.7)
EOSINOPHIL NFR BLD AUTO: 1.1 %
ERYTHROCYTE [DISTWIDTH] IN BLOOD BY AUTOMATED COUNT: 19.7 % (ref 10–15)
HCT VFR BLD AUTO: 62.9 % (ref 35–47)
HGB BLD-MCNC: 19.6 G/DL (ref 11.7–15.7)
IMM GRANULOCYTES # BLD: 0 10E9/L (ref 0–0.4)
IMM GRANULOCYTES NFR BLD: 0.4 %
INR PPP: 2.51 (ref 0.86–1.14)
LYMPHOCYTES # BLD AUTO: 1.4 10E9/L (ref 0.8–5.3)
LYMPHOCYTES NFR BLD AUTO: 27 %
MCH RBC QN AUTO: 28.2 PG (ref 26.5–33)
MCHC RBC AUTO-ENTMCNC: 31.2 G/DL (ref 31.5–36.5)
MCV RBC AUTO: 91 FL (ref 78–100)
MONOCYTES # BLD AUTO: 0.4 10E9/L (ref 0–1.3)
MONOCYTES NFR BLD AUTO: 6.6 %
NEUTROPHILS # BLD AUTO: 3.4 10E9/L (ref 1.6–8.3)
NEUTROPHILS NFR BLD AUTO: 64.3 %
NRBC # BLD AUTO: 0 10*3/UL
NRBC BLD AUTO-RTO: 0 /100
PLATELET # BLD AUTO: 111 10E9/L (ref 150–450)
RBC # BLD AUTO: 6.94 10E12/L (ref 3.8–5.2)
WBC # BLD AUTO: 5.3 10E9/L (ref 4–11)

## 2018-07-24 PROCEDURE — 85610 PROTHROMBIN TIME: CPT | Performed by: INTERNAL MEDICINE

## 2018-07-24 PROCEDURE — 90834 PSYTX W PT 45 MINUTES: CPT | Performed by: PSYCHOLOGIST

## 2018-07-24 PROCEDURE — 36415 COLL VENOUS BLD VENIPUNCTURE: CPT | Performed by: INTERNAL MEDICINE

## 2018-07-24 PROCEDURE — 85025 COMPLETE CBC W/AUTO DIFF WBC: CPT | Performed by: INTERNAL MEDICINE

## 2018-07-24 RX ORDER — FUROSEMIDE 20 MG
TABLET ORAL
Qty: 180 TABLET | Refills: 2 | Status: SHIPPED | OUTPATIENT
Start: 2018-07-24 | End: 2018-12-11

## 2018-07-24 NOTE — MR AVS SNAPSHOT
MRN:[id number]                      After Visit Summary   7/24/2018    Bia Bill    MRN: [id number]           Visit Information        Provider Department      7/24/2018 5:00 PM Arturo Kd CHAWLA [facility]      Your next 10 appointments already scheduled     Jul 30, 2018  9:30 AM CDT   Return Visit with Kd Morris   [facility] ([facility])    [address] [place] [address]   [phone]            Aug 02, 2018 12:30 PM CDT   Office Visit with Raulito Marquez [facility] ([facility])    [address] [place] [address]   [phone]           Bring a current list of meds and any records pertaining to this visit. For Physicals, please bring immunization records and any forms needing to be filled out. Please arrive 10 minutes early to complete paperwork.            Aug 02, 2018  1:30 PM CDT   Ortho Treatment with Joseline Madera, PT   [facility] ([facility])    [address] [place] [address]   [phone]            Aug 21, 2018  6:00 PM CDT   Return Visit with Kd Morris   [facility] ([facility])    [address], [place] [address]   [phone]            Sep 21, 2018  1:00 PM CDT   LAB with [facility] ([facility])    [address] [place] [address]   [phone]           Please do not eat 10-12 hours before your appointment if you are coming in fasting for labs on lipids, cholesterol, or glucose (sugar). This does not apply to pregnant women. Water, hot tea and black coffee (with nothing added) are okay. Do not drink other fluids, diet soda or chew gum.            Sep 28, 2018  1:30 PM CDT   Return Visit with Joshua Watts MD   [facility] ([facility])    Critical access hospital  [facility]  [address]  [facility] [address]   [phone]              Care EveryWhere ID     This is your Care EveryWhere ID. This could be used by other organizations to access your [facility] medical records  [id number]        Equal Access to Services     ANTIONETTE IRBY : Elfego giraldo Sojossy, waaxda luqadaha, qaybta kaalmada obed, nitin craig. So [facility] [phone].    ATENCIÓN: Si habla español, tiene a terry disposición servicios gratuitos de asistencia lingüística. Llame al [phone].    We comply with applicable federal civil rights laws and [place] laws. We do not discriminate on the basis of race, color, national origin, age, disability, sex, sexual orientation, or gender identity.

## 2018-07-25 ENCOUNTER — ANTICOAGULATION THERAPY VISIT (OUTPATIENT)
Dept: ANTICOAGULATION | Facility: CLINIC | Age: 53
End: 2018-07-25
Payer: COMMERCIAL

## 2018-07-25 DIAGNOSIS — I82.409 DVT (DEEP VENOUS THROMBOSIS) (H): ICD-10-CM

## 2018-07-25 DIAGNOSIS — Z79.01 LONG TERM CURRENT USE OF ANTICOAGULANT THERAPY: ICD-10-CM

## 2018-07-25 DIAGNOSIS — I73.9 PVD (PERIPHERAL VASCULAR DISEASE) WITH CLAUDICATION (H): ICD-10-CM

## 2018-07-25 PROCEDURE — 99207 ZZC NO CHARGE NURSE ONLY: CPT | Performed by: REGISTERED NURSE

## 2018-07-25 NOTE — MR AVS SNAPSHOT
Bia Bill   7/25/2018   Anticoagulation Therapy Visit    Description:  52 year old female   Provider:  Eula Batres, RN   Department:  [place] [facility]           INR as of 7/25/2018     Today's INR 2.51! (7/24/2018)      Anticoagulation Summary as of 7/25/2018     INR goal 2.0-2.5   Today's INR 2.51! (7/24/2018)   Full warfarin instructions 10 mg on Mon, Wed, Fri; 12.5 mg all other days   Next INR check 8/21/2018    Indications   Long term current use of anticoagulant therapy [Z79.01]  DVT (deep venous thrombosis) (H) [I82.409]  PVD (peripheral vascular disease) with claudication (H) [I73.9]         July 2018 Details    Sun Mon Tue Wed Thu Fri Sat     1               2               3               4               5               6               7                 8               9               10               11               12               13               14                 15               16               17               18               19               20               21                 22               23               24               25      10 mg   See details      26      12.5 mg         27      10 mg         28      12.5 mg           29      12.5 mg         30      10 mg         31      12.5 mg              Date Details   07/25 This INR check               How to take your warfarin dose     To take:  10 mg Take 2 of the 5 mg tablets.    To take:  12.5 mg Take 2.5 of the 5 mg tablets.           August 2018 Details    Sun Mon Tue Wed Thu Fri Sat        1      10 mg         2      12.5 mg         3      10 mg         4      12.5 mg           5      12.5 mg         6      10 mg         7      12.5 mg         8      10 mg         9      12.5 mg         10      10 mg         11      12.5 mg           12      12.5 mg         13      10 mg         14      12.5 mg         15      10 mg         16      12.5 mg         17      10 mg         18      12.5 mg           19      12.5 mg         20       10 mg         21            22               23               24               25                 26               27               28               29               30               31                 Date Details   No additional details    Date of next INR:  8/21/2018         How to take your warfarin dose     To take:  10 mg Take 2 of the 5 mg tablets.    To take:  12.5 mg Take 2.5 of the 5 mg tablets.

## 2018-07-25 NOTE — PROGRESS NOTES
ANTICOAGULATION FOLLOW-UP CLINIC VISIT    Patient Name:  Bia Bill  Date:  7/25/2018  Contact Type:  Face to Face    SUBJECTIVE:     Patient Findings     Positives No Problem Findings    Comments L/m for pt to call the clinic. 7/25/18 0934/ Eula Batres RN Anticoagulation Clinic  Patient called back, she states she took warfarin as directed and she denies missed warfarin doses as well as changes to diet, activity or medications. Writer instructed her to continue her 80mg weekly warfarin dose. Patient verbalizes understanding and agrees to plan. No further questions or concerns.               OBJECTIVE    INR   Date Value Ref Range Status   07/24/2018 2.51 (H) 0.86 - 1.14 Corrected     Comment:     Special tube used to correct for high hematocrit  CORRECTED ON 07/24 AT 2229: PREVIOUSLY REPORTED AS Special tube used to   correct for high hematocrit CORRECTED ON 07/24 AT 1759: PREVIOUSLY REPORTED AS   2.51         ASSESSMENT / PLAN  No question data found.  Anticoagulation Summary as of 7/25/2018     INR goal 2.0-2.5   Today's INR 2.51! (7/24/2018)   Warfarin maintenance plan 10 mg (5 mg x 2) on Mon, Wed, Fri; 12.5 mg (5 mg x 2.5) all other days   Full warfarin instructions 10 mg on Mon, Wed, Fri; 12.5 mg all other days   Weekly warfarin total 80 mg   Plan last modified Angelina Nj RN (7/12/2018)   Next INR check 8/21/2018   Priority INR   Target end date Indefinite    Indications   Long term current use of anticoagulant therapy [Z79.01]  DVT (deep venous thrombosis) (H) [I82.409]  PVD (peripheral vascular disease) with claudication (H) [I73.9]         Anticoagulation Episode Summary     INR check location     Preferred lab     Send INR reminders to [place] [facility] POOL    Comments * lab draw due to elevated hematocrit (fingerstick meters don't work). LEFT LE. STENT x 3 LEFT UPPER LEG. Previous arterial clot. New goal range 2-2.5 starting 11/6/17.      Anticoagulation Care Providers      Provider Role Specialty Phone number    Kd Leong MD [facility] [phone]            See the Encounter Report to view Anticoagulation Flowsheet and Dosing Calendar (Go to Encounters tab in chart review, and find the Anticoagulation Therapy Visit)        Eula Batres RN

## 2018-07-26 ENCOUNTER — HOSPITAL ENCOUNTER (OUTPATIENT)
Dept: PHYSICAL THERAPY | Facility: CLINIC | Age: 53
Setting detail: THERAPIES SERIES
End: 2018-07-26
Attending: FAMILY MEDICINE
Payer: COMMERCIAL

## 2018-07-26 PROCEDURE — 40000718 ZZHC STATISTIC PT DEPARTMENT ORTHO VISIT: Performed by: PHYSICAL THERAPIST

## 2018-07-26 PROCEDURE — 97110 THERAPEUTIC EXERCISES: CPT | Mod: GP | Performed by: PHYSICAL THERAPIST

## 2018-07-26 ASSESSMENT — ANXIETY QUESTIONNAIRES
GAD7 TOTAL SCORE: 9
4. TROUBLE RELAXING: MORE THAN HALF THE DAYS
7. FEELING AFRAID AS IF SOMETHING AWFUL MIGHT HAPPEN: NOT AT ALL
6. BECOMING EASILY ANNOYED OR IRRITABLE: NEARLY EVERY DAY
5. BEING SO RESTLESS THAT IT IS HARD TO SIT STILL: SEVERAL DAYS
3. WORRYING TOO MUCH ABOUT DIFFERENT THINGS: SEVERAL DAYS
1. FEELING NERVOUS, ANXIOUS, OR ON EDGE: SEVERAL DAYS
2. NOT BEING ABLE TO STOP OR CONTROL WORRYING: SEVERAL DAYS

## 2018-07-26 NOTE — PROGRESS NOTES
Progress Note  Disclaimer: This note consists of symbols derived from keyboarding, dictation and/or voice recognition software. As a result, there may be errors in the script that have gone undetected. Please consider this when interpreting information found in this chart.    Client Name: Bia Bill  Date: 2018         Service Type: Individual      Session Start Time: 5:00 PM  Session End Time: 5:45 PM      Session Length: 45     Session #: 32     Attendees: Client attended alone    Treatment Plan Last Reviewed: 4/3/2018  PHQ-9 / CHIN-7 : See flowsheet     DATA   Client reports she may be looking at knee surgery if pain injections do not work.  She is establishing some more clear boundaries with her daughter as her daughter is drinking and trying to influence the client to join her.  This would put clients sobriety at significant risk.  Client reports she is still engaging in sewing projects with her sister but is limiting how much they talk about past family issues.  Client noted she has been thinking more more about the positive interactions she had with her father before he .   Progress Since Last Session (Related to Symptoms / Goals / Homework):   Symptoms: Stable    Homework: Achieved / completed to satisfaction      Episode of Care Goals: Satisfactory progress - ACTION (Actively working towards change); Intervened by reinforcing change plan / affirming steps taken     Current / Ongoing Stressors and Concerns:   Recurrent depression  family relational problems, medical problems, chronic pain, trauma history      Treatment Objective(s) Addressed in This Session:   Increase interest, engagement, and pleasure in doing things  Decrease frequency and intensity of feeling down, depressed, hopeless       Intervention:   CBT: Behavioral activation supported client's effort at establishing maintaining healthy boundaries with her family.  ASSESSMENT: Current  Emotional / Mental Status (status of significant symptoms):   Risk status (Self / Other harm or suicidal ideation)   Client denies current fears or concerns for personal safety.   Client denies current or recent suicidal ideation or behaviors.   Client denies current or recent homicidal ideation or behaviors.   Client denies current or recent self injurious behavior or ideation.   Client denies other safety concerns.   A safety and risk management plan has not been developed at this time, however client was given the after-hours number / 911 should there be a change in any of these risk factors.     Appearance:   Appropriate    Eye Contact:   Good    Psychomotor Behavior: Normal    Attitude:   Cooperative    Orientation:   All   Speech    Rate / Production: Normal     Volume:  Normal    Mood:    Normal   Affect:    Appropriate    Thought Content:  Clear    Thought Form:  Coherent  Logical    Insight:    Good      Medication Review:   No changes to current psychiatric medication(s)     Medication Compliance:   Yes     Changes in Health Issues:   None reported     Chemical Use Review:   Substance Use: Chemical use reviewed, no active concerns identified      Tobacco Use: Client reports no smoking for the last week     Collateral Reports Completed:   Not Applicable    PLAN: (Client Tasks / Therapist Tasks / Other)  Client to continue With her self-care routine and maintenance of boundaries.   Maintain medication compliance and contact with psychiatry.  Take care of herself physically.  Practice one to 2 grounding techniques each day.   Kd Morris                                                         ________________________________________________________________________    Treatment Plan    Client's Name: Bia Bill  YOB: 1965    Date: 10/24/2017      DSM5 Diagnoses: (Sustained by DSM5 Criteria Listed Above)  Diagnoses: 296.40 Bipolar I Disorder Current or Most Recent Episode Manic,  Unspecified  Psychosocial & Contextual Factors: financial difficulties, chronic pain, roommate issues  WHODAS 2.0 (12 item)               This questionnaire asks about difficulties due to health conditions. Health conditions             include disease or illnesses, other health problems that may be short or long lasting,             injuries, mental health or emotional problems, and problems with alcohol or drugs.                         Think back over the past 30 days and answer these questions, thinking about how much             difficulty you had doing the following activities. For each question, please [place] only             one response.      S1  Standing for long periods such as 30 minutes?  Mild =           2    S2  Taking care of household responsibilities?  Moderate =   3    S3  Learning a new task, for example, learning how to get to a new place?  Mild =           2    S4  How much of a problem do you have joining community activities (for example, festivals, Yazidism or other activities) in the same way as anyone else can?  Moderate =   3    S5  How much have you been emotionally affected by your health problems?  Mild =           2        In the past 30 days, how much difficulty did you have in:    S6  Concentrating on doing something for ten minutes?  Mild =           2    S7  Walking a long distance such as a kilometer (or equivalent)?  Severe =       4    S8  Washing your whole body?  None =         1    S9  Getting dressed?  None =         1    S10  Dealing with people you do not know?  Moderate =   3    S11  Maintaining a friendship?  Severe =       4    S12  Your day to day work?  Moderate =   3       H1  Overall, in the past 30 days, how many days were these difficulties present?  Record number of days seven    H2  In the past 30 days, for how many days were you totally unable to carry out your usual activities or work because of any health condition?  Record number of days  seven    H3  In  the past 30 days, not counting the days that you were totally unable, for how many days did you cut back or reduce your usual activities or work because of any health condition?  Record number of days five                   Referral / Collaboration:  Referral to another professional/service is not indicated at this time..    Anticipated number of session or this episode of care: 15    Goals  1. Education- the Biopsychosocial model of depression  a. Client will be able to describe how depression is effecting them physically, emotionally and socially  2. Behavioral Activation  a. Client will learn to assess their depression on a day to day basis  b. Client will Identify two forms of exercise/activity and engage in them 3 times per week  c. Client will Identify 3 things that make them laugh, and engage in these 5 times per week.  d. Client will Identify 1-2Creative activities or hobbies  and engage in them 2 times per week  e. Client will identify music, movies, books that make them feel good and use them 3-4 times per week  3. Self-care  a. Client will identify 5 things they can do just for themselves  b. Client will take time for quiet, reflection, meditation 5 times per week  c. Client will Learn to set boundaries when appropriate  d. Client will Identify 2 individuals they can call on for support, distraction  4. Assessment of progress  a. Client will engage in assessment of their progress on a regular basis    Bipolar Disorder  Treatment plan:  1. Education- the Biopsychosocial model of Bipolar Disorder    a. Client will be able to describe in general terms what Bipolar Disorder  is and is not  b. Client will be able to describe how Bipolar Disorder  has affected their life in at least two different areas, such as school or work and Home/relationships  c. Clients parents/guardians or significant others will be provided information on what Bipolar Disorder  is and the ways it can affect relationships and be  encouraged to be a part of clients treatment team.  2. Medication  a. Client will participate in medication evaluation for Bipolar Disorder  symptoms and follow medication recommendations.   3. Identification and management of triggers  a. Client and therapist will examine patient s history to determine if there are predictable triggers for manic or depressive episodes (e.g. boredom, anger, family stress)  b. Client and therapist will develop means of diffusing these triggers (e.g. relaxation strategies, boundary setting, anger management)  4. Comorbid conditions   a. Client and therapist will asses for comorbid conditions (e.g. anxiety, depression, substance use). and add additional items to treatment plan as needed  5. Self-care  a. Client will identify 3 things they can do just for themselves  b. Client will take time for quiet, reflection, meditation 3 times per week  c. Client will Learn to set boundaries when appropriate  d. Client will Identify 2 individuals they can call on for support, distraction  5. Behavioral Activation  a. Client will Identify two forms of exercise/activity and engage in them 3 times per week  b. Client will Identify 2 things that make them laugh, and engage in these 3 times per week.  c. Client will Identify 2 Creative activities or hobbies  and engage in them 2 times per week  d. Client will identify music, movies, books that make them feel good and use them 3 times per week  6. Assessment of progress  a. Client will engage in assessment of their progress on a regular basis    Client has reviewed and agreed to the above plan.      Kd Morris  4/3/2018

## 2018-07-27 ASSESSMENT — PATIENT HEALTH QUESTIONNAIRE - PHQ9: SUM OF ALL RESPONSES TO PHQ QUESTIONS 1-9: 11

## 2018-07-27 ASSESSMENT — ANXIETY QUESTIONNAIRES: GAD7 TOTAL SCORE: 9

## 2018-07-29 DIAGNOSIS — G25.81 RESTLESS LEG SYNDROME: ICD-10-CM

## 2018-07-30 ENCOUNTER — OFFICE VISIT (OUTPATIENT)
Dept: PSYCHOLOGY | Facility: CLINIC | Age: 53
End: 2018-07-30
Payer: COMMERCIAL

## 2018-07-30 DIAGNOSIS — F31.62 MODERATE MIXED BIPOLAR I DISORDER (H): Primary | ICD-10-CM

## 2018-07-30 DIAGNOSIS — F33.1 MAJOR DEPRESSIVE DISORDER, RECURRENT EPISODE, MODERATE (H): ICD-10-CM

## 2018-07-30 PROCEDURE — 90834 PSYTX W PT 45 MINUTES: CPT | Performed by: PSYCHOLOGIST

## 2018-07-30 RX ORDER — TRAZODONE HYDROCHLORIDE 150 MG/1
150 TABLET ORAL AT BEDTIME
Qty: 30 TABLET | Refills: 0 | Status: SHIPPED | OUTPATIENT
Start: 2018-07-30 | End: 2018-08-04

## 2018-07-30 NOTE — TELEPHONE ENCOUNTER
trazodone      Last Written Prescription Date:  5/31/18  Last Fill Quantity: 30,   # refills: 0  Last Office Visit: 5/31/18  Future Office visit:    Next 5 appointments (look out 90 days)     Aug 02, 2018 12:30 PM CDT   Office Visit with Raulito Marquez [facility] ([facility])    [address] [place] [address]   [phone]            Aug 21, 2018  6:00 PM CDT   Return Visit with Kd CHAWLA [facility] ([facility])    [address] [place] [address]   [phone]            Sep 04, 2018  9:30 AM CDT   Return Visit with Kd CHAWLA [facility] ([facility])    [address] [facility] [address]   [phone]            Sep 28, 2018  1:30 PM CDT   Return Visit with Joshua Watts MD   [facility] ([facility])    [facility] [address]  [facility] [address]   [phone]                   Routing refill request to provider for review/approval because:  Drug not on the [facility], [facility] or [facility] refill protocol or controlled substance

## 2018-07-30 NOTE — MR AVS SNAPSHOT
MRN:[id number]                      After Visit Summary   7/30/2018    Bia Bill    MRN: [id number]           Visit Information        Provider Department      7/30/2018 9:30 AM Kd Morris [facility] Generic      Your next 10 appointments already scheduled     Aug 02, 2018 12:30 PM CDT   Office Visit with Raulito Marquez, [facility] ([facility])    [address] [place] [address]   [phone]           Bring a current list of meds and any records pertaining to this visit. For Physicals, please bring immunization records and any forms needing to be filled out. Please arrive 10 minutes early to complete paperwork.            Aug 02, 2018  1:30 PM CDT   Ortho Treatment with Joseline Madera, PT   [facility] ([facility])    [address] [place] [id number]   [phone]            Aug 21, 2018  6:00 PM CDT   Return Visit with Kd Morris   [facility] ([facility])    [address] [facility] [address]   [phone]            Sep 04, 2018  9:30 AM CDT   Return Visit with Kd Morris   [facility] ([facility])    [address] [facility] [address]   [phone]            Sep 21, 2018  1:00 PM CDT   LAB with [facility] ([facility])    [address] [facility] [address]   [phone]           Please do not eat 10-12 hours before your appointment if you are coming in fasting for labs on lipids, cholesterol, or glucose (sugar). This does not apply to pregnant women. Water, hot tea and black coffee (with nothing added) are okay. Do not drink other fluids, diet soda or chew gum.            Sep 28, 2018  1:30 PM CDT   Return Visit with Joshua Watts MD   [facility] ([facility])    [facility]  [address] [facility] [address]  [facility] [place] [place] [address]   [phone]              Care EveryWhere ID     This is your Care EveryWhere ID. This could be used by other organizations to access your [place] medical records  [id number]        Equal Access to Services     ANTIONETTE IRBY : Elfego giraldo Sojossy, waaxda luqadaha, qaybta kaalmada obed, nitin craig. So [facility] [phone].    ATENCIÓN: Si habla español, tiene a terry disposición servicios gratuitos de asistencia lingüística. Llame al [phone].    We comply with applicable federal civil rights laws and [place] laws. We do not discriminate on the basis of race, color, national origin, age, disability, sex, sexual orientation, or gender identity.

## 2018-07-30 NOTE — TELEPHONE ENCOUNTER
PRAMIPEXOLE 0.125MG TAB MACL        Last Written Prescription Date:  5/31/18  Last Fill Quantity: 60,   # refills: 1  Last Office Visit: 5/31/18  Future Office visit:    Next 5 appointments (look out 90 days)     Aug 02, 2018 12:30 PM CDT   Office Visit with Raulito Marquez [facility] ([facility])    [address] [place] [address]   [phone]            Aug 21, 2018  6:00 PM CDT   Return Visit with Kd Morris   [facility] ([facility])    [address] [facility] [address]   [phone]            Sep 04, 2018  9:30 AM CDT   Return Visit with Kd Morris   [facility] ([facility])    [address] [facility] [address]   [phone]            Sep 28, 2018  1:30 PM CDT   Return Visit with Joshua Watts MD   [facility] ([facility])    [facility]  [address] [facility] [address]  [facility] [place] [address]   [phone]                   Requested Prescriptions   Pending Prescriptions Disp Refills     pramipexole (MIRAPEX) 0.125 MG tablet [Pharmacy Med Name: PRAMIPEXOLE 0.125MG TAB MACL] 60 tablet 0     Sig: TAKE 1-2 TABLETS (0.125-0.25 MG) BY MOUTH AT BEDTIME    Antiparkinson's Agents Protocol Passed    7/29/2018  9:31 AM       Passed - Blood pressure under 140/90 in past 12 months    BP Readings from Last 3 Encounters:   07/10/18 130/76   06/13/18 138/77   05/31/18 113/73                Passed - CBC on record in past 12 months    Recent Labs   Lab Test  07/24/18   1704   WBC  5.3   RBC  6.94*   HGB  19.6*   HCT  62.9*   PLT  111*       For [facility] ONLY: MUKH100 = WBC, PHOI962 = RBC         Passed - ALT on record in past 12 months        Recent Labs   Lab Test  05/16/18   1043   ALT  34            Passed - Serum Creatinine on file in past 12 months    Recent Labs   Lab Test  05/16/18   1043   CR  0.69            Passed - Patient is  "age 18 or older       Passed - No active pregnancy on record       Passed - No positive pregnancy test in the past 12 months       Passed - Recent (6 mo) or future (30 days) visit within the authorizing provider's specialty    Patient had office visit in the last 6 months or has a visit in the next 30 days with authorizing provider or within the authorizing provider's specialty.  See \"Patient Info\" tab in inbasket, or \"Choose Columns\" in Meds & Orders section of the refill encounter.              "

## 2018-07-31 ASSESSMENT — ANXIETY QUESTIONNAIRES
4. TROUBLE RELAXING: MORE THAN HALF THE DAYS
3. WORRYING TOO MUCH ABOUT DIFFERENT THINGS: SEVERAL DAYS
7. FEELING AFRAID AS IF SOMETHING AWFUL MIGHT HAPPEN: NOT AT ALL
5. BEING SO RESTLESS THAT IT IS HARD TO SIT STILL: MORE THAN HALF THE DAYS
GAD7 TOTAL SCORE: 10
1. FEELING NERVOUS, ANXIOUS, OR ON EDGE: SEVERAL DAYS
2. NOT BEING ABLE TO STOP OR CONTROL WORRYING: SEVERAL DAYS
6. BECOMING EASILY ANNOYED OR IRRITABLE: NEARLY EVERY DAY

## 2018-07-31 NOTE — PROGRESS NOTES
Progress Note  Disclaimer: This note consists of symbols derived from keyboarding, dictation and/or voice recognition software. As a result, there may be errors in the script that have gone undetected. Please consider this when interpreting information found in this chart.    Client Name: Bia Bill  Date: 7/30/2018         Service Type: Individual      Session Start Time: 9:30 AM  Session End Time: 10:15 AM      Session Length: 45     Session #: 33     Attendees: Client attended alone    Treatment Plan Last Reviewed: 4/3/2018  PHQ-9 / CHIN-7 : See flowsheet     DATA   Client reports she is still experiencing significant pain and irritability.  She is having ongoing difficulty with her daughter and sister who are both experiencing alcohol problems.  She feels that they are trying to suck her into their addictions and she is determined to maintain her sobriety.   Progress Since Last Session (Related to Symptoms / Goals / Homework):   Symptoms: Stable    Homework: Achieved / completed to satisfaction      Episode of Care Goals: Satisfactory progress - ACTION (Actively working towards change); Intervened by reinforcing change plan / affirming steps taken     Current / Ongoing Stressors and Concerns:   Recurrent depression  family relational problems, medical problems, chronic pain, trauma history      Treatment Objective(s) Addressed in This Session:   Increase interest, engagement, and pleasure in doing things  Decrease frequency and intensity of feeling down, depressed, hopeless       Intervention:   CBT: Behavioral activation supported client's effort at establishing maintaining healthy boundaries with her family.  ASSESSMENT: Current Emotional / Mental Status (status of significant symptoms):   Risk status (Self / Other harm or suicidal ideation)   Client denies current fears or concerns for personal safety.   Client denies current or recent suicidal ideation or  behaviors.   Client denies current or recent homicidal ideation or behaviors.   Client denies current or recent self injurious behavior or ideation.   Client denies other safety concerns.   A safety and risk management plan has not been developed at this time, however client was given the after-hours number / 911 should there be a change in any of these risk factors.     Appearance:   Appropriate    Eye Contact:   Good    Psychomotor Behavior: Normal    Attitude:   Cooperative    Orientation:   All   Speech    Rate / Production: Normal     Volume:  Normal    Mood:    Normal   Affect:    Appropriate    Thought Content:  Clear    Thought Form:  Coherent  Logical    Insight:    Good      Medication Review:   No changes to current psychiatric medication(s)     Medication Compliance:   Yes     Changes in Health Issues:   None reported     Chemical Use Review:   Substance Use: Chemical use reviewed, no active concerns identified      Tobacco Use: Client reports no smoking for the last week     Collateral Reports Completed:   Not Applicable    PLAN: (Client Tasks / Therapist Tasks / Other)  Client to continue With her self-care routine and maintenance of boundaries.   Maintain medication compliance and contact with psychiatry.  Take care of herself physically.  Practice one to 2 grounding techniques each day.   Client to maintain sobriety and contact with supportive sober others.  Kd Morris                                                         ________________________________________________________________________    Treatment Plan    Client's Name: Bia Bill  YOB: 1965    Date: 10/24/2017      DSM5 Diagnoses: (Sustained by DSM5 Criteria Listed Above)  Diagnoses: 296.40 Bipolar I Disorder Current or Most Recent Episode Manic, Unspecified  Psychosocial & Contextual Factors: financial difficulties, chronic pain, roommate issues  WHODAS 2.0 (12 item)               This questionnaire asks about  difficulties due to health conditions. Health conditions             include disease or illnesses, other health problems that may be short or long lasting,             injuries, mental health or emotional problems, and problems with alcohol or drugs.                         Think back over the past 30 days and answer these questions, thinking about how much             difficulty you had doing the following activities. For each question, please [place] only             one response.      S1  Standing for long periods such as 30 minutes?  Mild =           2    S2  Taking care of household responsibilities?  Moderate =   3    S3  Learning a new task, for example, learning how to get to a new place?  Mild =           2    S4  How much of a problem do you have joining community activities (for example, festivals, [employer] or other activities) in the same way as anyone else can?  Moderate =   3    S5  How much have you been emotionally affected by your health problems?  Mild =           2        In the past 30 days, how much difficulty did you have in:    S6  Concentrating on doing something for ten minutes?  Mild =           2    S7  Walking a long distance such as a kilometer (or equivalent)?  Severe =       4    S8  Washing your whole body?  None =         1    S9  Getting dressed?  None =         1    S10  Dealing with people you do not know?  Moderate =   3    S11  Maintaining a friendship?  Severe =       4    S12  Your day to day work?  Moderate =   3       H1  Overall, in the past 30 days, how many days were these difficulties present?  Record number of days seven    H2  In the past 30 days, for how many days were you totally unable to carry out your usual activities or work because of any health condition?  Record number of days  seven    H3  In the past 30 days, not counting the days that you were totally unable, for how many days did you cut back or reduce your usual activities or work because of any health  condition?  Record number of days five                   Referral / Collaboration:  Referral to another professional/service is not indicated at this time..    Anticipated number of session or this episode of care: 15    Goals  1. Education- the Biopsychosocial model of depression  a. Client will be able to describe how depression is effecting them physically, emotionally and socially  2. Behavioral Activation  a. Client will learn to assess their depression on a day to day basis  b. Client will Identify two forms of exercise/activity and engage in them 3 times per week  c. Client will Identify 3 things that make them laugh, and engage in these 5 times per week.  d. Client will Identify 1-2Creative activities or hobbies  and engage in them 2 times per week  e. Client will identify music, movies, books that make them feel good and use them 3-4 times per week  3. Self-care  a. Client will identify 5 things they can do just for themselves  b. Client will take time for quiet, reflection, meditation 5 times per week  c. Client will Learn to set boundaries when appropriate  d. Client will Identify 2 individuals they can call on for support, distraction  4. Assessment of progress  a. Client will engage in assessment of their progress on a regular basis    Bipolar Disorder  Treatment plan:  1. Education- the Biopsychosocial model of Bipolar Disorder    a. Client will be able to describe in general terms what Bipolar Disorder  is and is not  b. Client will be able to describe how Bipolar Disorder  has affected their life in at least two different areas, such as school or work and Home/relationships  c. Clients parents/guardians or significant others will be provided information on what Bipolar Disorder  is and the ways it can affect relationships and be encouraged to be a part of clients treatment team.  2. Medication  a. Client will participate in medication evaluation for Bipolar Disorder  symptoms and follow medication  recommendations.   3. Identification and management of triggers  a. Client and therapist will examine patient s history to determine if there are predictable triggers for manic or depressive episodes (e.g. boredom, anger, family stress)  b. Client and therapist will develop means of diffusing these triggers (e.g. relaxation strategies, boundary setting, anger management)  4. Comorbid conditions   a. Client and therapist will asses for comorbid conditions (e.g. anxiety, depression, substance use). and add additional items to treatment plan as needed  5. Self-care  a. Client will identify 3 things they can do just for themselves  b. Client will take time for quiet, reflection, meditation 3 times per week  c. Client will Learn to set boundaries when appropriate  d. Client will Identify 2 individuals they can call on for support, distraction  5. Behavioral Activation  a. Client will Identify two forms of exercise/activity and engage in them 3 times per week  b. Client will Identify 2 things that make them laugh, and engage in these 3 times per week.  c. Client will Identify 2 Creative activities or hobbies  and engage in them 2 times per week  d. Client will identify music, movies, books that make them feel good and use them 3 times per week  6. Assessment of progress  a. Client will engage in assessment of their progress on a regular basis    Client has reviewed and agreed to the above plan.      Kd Morris  4/3/2018

## 2018-08-01 RX ORDER — PRAMIPEXOLE DIHYDROCHLORIDE 0.12 MG/1
TABLET ORAL
Qty: 60 TABLET | Refills: 0 | Status: SHIPPED | OUTPATIENT
Start: 2018-08-01 | End: 2018-08-29

## 2018-08-01 ASSESSMENT — ANXIETY QUESTIONNAIRES: GAD7 TOTAL SCORE: 10

## 2018-08-01 ASSESSMENT — PATIENT HEALTH QUESTIONNAIRE - PHQ9: SUM OF ALL RESPONSES TO PHQ QUESTIONS 1-9: 9

## 2018-08-01 NOTE — TELEPHONE ENCOUNTER
Routing refill request to provider for review/approval because:  Labs out of range:  See below    Bailey Lozano RN

## 2018-08-02 ENCOUNTER — OFFICE VISIT (OUTPATIENT)
Dept: PHARMACY | Facility: CLINIC | Age: 53
End: 2018-08-02
Payer: COMMERCIAL

## 2018-08-02 ENCOUNTER — HOSPITAL ENCOUNTER (OUTPATIENT)
Dept: PHYSICAL THERAPY | Facility: CLINIC | Age: 53
Setting detail: THERAPIES SERIES
End: 2018-08-02
Attending: FAMILY MEDICINE
Payer: COMMERCIAL

## 2018-08-02 VITALS — SYSTOLIC BLOOD PRESSURE: 132 MMHG | DIASTOLIC BLOOD PRESSURE: 74 MMHG

## 2018-08-02 DIAGNOSIS — M70.61 TROCHANTERIC BURSITIS OF BOTH HIPS: ICD-10-CM

## 2018-08-02 DIAGNOSIS — M70.62 TROCHANTERIC BURSITIS OF BOTH HIPS: ICD-10-CM

## 2018-08-02 DIAGNOSIS — J45.30 MILD PERSISTENT ASTHMA WITHOUT COMPLICATION: ICD-10-CM

## 2018-08-02 DIAGNOSIS — R60.9 EDEMA, UNSPECIFIED TYPE: ICD-10-CM

## 2018-08-02 DIAGNOSIS — Z86.718 HISTORY OF DEEP VENOUS THROMBOSIS: ICD-10-CM

## 2018-08-02 DIAGNOSIS — K21.9 GASTROESOPHAGEAL REFLUX DISEASE WITHOUT ESOPHAGITIS: ICD-10-CM

## 2018-08-02 DIAGNOSIS — G63 POLYNEUROPATHY IN OTHER DISEASES CLASSIFIED ELSEWHERE (H): ICD-10-CM

## 2018-08-02 DIAGNOSIS — M10.9 GOUT, UNSPECIFIED CAUSE, UNSPECIFIED CHRONICITY, UNSPECIFIED SITE: ICD-10-CM

## 2018-08-02 DIAGNOSIS — F34.1 PERSONALITY DISORDER, DEPRESSIVE: ICD-10-CM

## 2018-08-02 DIAGNOSIS — E78.5 HYPERLIPIDEMIA LDL GOAL <70: ICD-10-CM

## 2018-08-02 DIAGNOSIS — E63.9 NUTRITIONAL DEFICIENCY: ICD-10-CM

## 2018-08-02 DIAGNOSIS — E03.4 HYPOTHYROIDISM DUE TO ACQUIRED ATROPHY OF THYROID: ICD-10-CM

## 2018-08-02 DIAGNOSIS — G47.00 INSOMNIA, UNSPECIFIED TYPE: ICD-10-CM

## 2018-08-02 DIAGNOSIS — F31.62 MODERATE MIXED BIPOLAR I DISORDER (H): Primary | ICD-10-CM

## 2018-08-02 PROCEDURE — 99607 MTMS BY PHARM ADDL 15 MIN: CPT | Performed by: PHARMACIST

## 2018-08-02 PROCEDURE — G8979 MOBILITY GOAL STATUS: HCPCS | Mod: GP,CI | Performed by: PHYSICAL THERAPIST

## 2018-08-02 PROCEDURE — 97535 SELF CARE MNGMENT TRAINING: CPT | Mod: GP | Performed by: PHYSICAL THERAPIST

## 2018-08-02 PROCEDURE — 97110 THERAPEUTIC EXERCISES: CPT | Mod: GP | Performed by: PHYSICAL THERAPIST

## 2018-08-02 PROCEDURE — 99605 MTMS BY PHARM NP 15 MIN: CPT | Performed by: PHARMACIST

## 2018-08-02 PROCEDURE — G8980 MOBILITY D/C STATUS: HCPCS | Mod: GP,CJ | Performed by: PHYSICAL THERAPIST

## 2018-08-02 PROCEDURE — 40000718 ZZHC STATISTIC PT DEPARTMENT ORTHO VISIT: Performed by: PHYSICAL THERAPIST

## 2018-08-02 NOTE — PROGRESS NOTES
Outpatient Physical Therapy Discharge Note     Patient: Bia Bill  : 1965    Beginning/End Dates of Reporting Period:  18 to 2018    Referring Provider: Dr. Nicholas    Therapy Diagnosis: decreased ROM and strength      Client Self Report: Pt relates L knee has been sore due to change in weather. pt relates R knee is occasionally bothersome as well. Walking has been the pt biggest c/o . She can only walk 1/2 block.     Objective Measurements:  Objective Measure: L knee ROM  Details:     Objective Measure: L knee MMT  Details: flex: 3/5 ext: 4/5      Objective Measure: R knee ROM  Details: 0114    Objective Measure: R knee MMT  Details: flex:  ext:       Objective Measure: Ambulation  Details: limp noted, very antalgic, uses SEC     Objective Measure: TTP  Details: throughout B knees       Outcome Measures (most recent score):  LEFS: 20/80     Goals:  Goal Identifier ROM  to get in/out of car   Goal Description Pt will demonstrate full ROM to be able to get in/out w less than 1/10 pain    Target Date 18   Date Met      Progress:limited motion as noted above, pt relates 9/10 pain      Goal Identifier walking    Goal Description Pt will be able to walk w 30 min w less than 1/10 pain to be able to go grocery shopping   Target Date 18   Date Met      Progress:pt relates she can  Only walk 1/2 block      Goal Identifier stairs   Goal Description Pt will be able to ascend/descend 5 steps with less than 1/10 pain in order to demonstrate improve strength and LE function and be able to enter home (unable)   Target Date 18   Date Met      Progress:pt demonstrates step to pattern and must use rails     Goal Identifier LEFS   Goal Description Pt will score 60/80 on LEFS outcome tool to demonstrate clinically significant improvement and be able to complete more tasks at home    Target Date 18   Date Met      Progress:pt score decreased to 20/80 (previously 36/80)        Progress Toward Goals:   Progress this reporting period: Pt has been seen 7 times over this POC. In that time, pt relates symptoms have gotten worse and she is less mobile. Pt demonstrates fair strength and has fair ROM in B knees, L worse than R. Pt has tried injections, bracing with out relief and has been edu on chronic pain. Due to continued pain, pt is being referred back to MD and is also encouraged to try pool therapy as she has difficulties with standing HEP. Pt given HEP to try in pool and encouraged to walk. Pt is being discharged at this time.       Plan:  Discharge from therapy.    Discharge:    Reason for Discharge: No further expectation of progress.    Equipment Issued: NA    Discharge Plan: Patient to continue home program.    Joseline Madera  Physical Therapist  [facility]  [address] [place] [address]  [email or web address]   [email or web address]   Office: [phone] Fax: [phone]

## 2018-08-02 NOTE — PATIENT INSTRUCTIONS
Recommendations from today's MTM visit:                                                    MTM (medication therapy management) is a service provided by a clinical pharmacist designed to help you get the most of out of your medicines.   Today we reviewed what your medicines are for, how to know if they are working, that your medicines are safe and how to make your medicine regimen as easy as possible.     1. You can try taking acetaminophen 1000 mg three times daily SCHEDULED (two extra strength Tylenol tablets three times daily).  Do not exceed 1000 mg of acetaminophen per dose or 3000 mg of acetaminophen total per day.  Sometimes you may be given other medications with acetaminophen in them so you need to be careful to watch for this and reduce you acetaminophen usage accordingly.     2. Remark your levothyroxine bottle so you take before your other morning medications.    Next MTM visit: 1 year or sooner if needed    To schedule another MTM appointment, please call the clinic directly or you may call the MTM scheduling line at [phone] or toll-free at [phone].     My Clinical Pharmacist's contact information:                                                      It was a pleasure seeing you today!  Please feel free to contact me with any questions or concerns you have.      Eduardo Marquez, PharmD, [facility]  Medication Therapy Management Pharmacist  Pager: [phone]    You may receive a survey about the MTM services you received.  I would appreciate your feedback to help me serve you better in the future. Please fill it out and return it when you can. Your comments will be anonymous.

## 2018-08-02 NOTE — PROGRESS NOTES
SUBJECTIVE/OBJECTIVE:                Bia Bill is a 52 year old female coming in for a follow-up visit for Medication Therapy Management.  She was referred to me from Dr. Leong.  This serves as an initial visit for 2018.    Chief Complaint: Follow up from our visit on 7/20/17.  Comprehensive Medication Review  Personal Healthcare Goals: patient is trying to improve her health overall - eating better, trying to lose weight, and cut back on smoking  Tobacco: 0-1 pack per day - is interested in quitting Tobacco Cessation Action Plan: cutting back on her own (under half a pack)   Alcohol: not currently using - 22 years ago - drug free 33 years    Medication Adherence: no issues reported    Bipolar I Disorder/Depression: Pt is currently taking Abilify 7.5 mg daily, bupropion  mg daily, and lamotrigine 200 mg daily. Pt has been meeting with therapist and finds this to be helpful.  Life is a little more stable besides her health. Feels that the medication shave been helpful. Denies any side effects.    Insomnia: Currently taking trazodone 150 mg nightly, which she finds to be effective. Wear's CPAP and sleeps well. Denies any known side effects.     Gout: Pt currently taking allopurinol 100 mg daily. Pt states she has not had any issues since last visit with gout. Denies any known side effects.  Uric Acid   Date Value Ref Range Status   11/30/2015 4.5 2.6 - 6.0 mg/dL Final     Asthma:  Current asthma medications: Dulera - 2 puffs twice daily and albuterol as needed (has not needed). Does use Duoneb a few times a week and has albuterol nebs available. Pt rinses their mouth after using steroid inhaler. Finding breathing is tougher with the humid weather, but doing okay right now. Denies any issues.  ACT Total Scores 5/2/2018   ACT TOTAL SCORE -   ASTHMA ER VISITS -   ASTHMA HOSPITALIZATIONS -   ACT TOTAL SCORE (Goal Greater than or Equal to 20) 19   In the past 12 months, how many times did you visit the  "emergency room for your asthma without being admitted to the hospital? 0   In the past 12 months, how many times were you hospitalized overnight because of your asthma? 0     Neuropathy: Pt is taking gabapentin 600 mg three times daily plus additional 300 mg at bedtime and pramipexole 0.25 mg at bedtime. Helps with the nerve pain - if she doesn't take it she is \"kicking all the time\".  Finds very effective. Denies any side effects.    DVT: Currently taking warfarin managed by the  anticoagulation clinic. Last INR was 2.51.  Denies any issues with bruising/bleeding.    Edema: Pt is currently taking furosemide 40 mg twice daily and potassium 10 mEq daily.  She finds this helps keep her swelling down and her legs look good today. Previously did go to lymphedema clinic with good results. Denies any current side effects.    Leg Pain: Pt is having a lot of issues with knee pain right now. Current attending PT, but she is not sure if this is helping.  She typically takes 2 tablets of acetaminophen if needed.  This dosing is not helping right now.  Did get Synvisc injection recently, but this has not helped. Denies any issues.    GERD: Current medications include: Prilosec (omeprazole) 20 mg once daily. Pt c/o no current symptoms.  Patient feels that current regimen is effective.    Hypothyroidism: Patient is taking levothyroxine 150 mcg daily. Patient is having the following symptoms: none.  TSH   Date Value Ref Range Status   04/06/2018 1.07 0.40 - 4.00 mU/L Final     Hyperlipidemia: Current therapy includes simvastatin 20 mg once daily.  Pt reports no significant myalgias or other side effects.     Supplements: Lactose intolerant. Pt taking  cholecalcierol 1000 units daily.  Denies any issues.    Today's Vitals: /74  LMP 09/27/2009   BP Readings from Last 3 Encounters:   07/10/18 130/76   06/13/18 138/77   05/31/18 113/73     Last Comprehensive Metabolic Panel:  Sodium   Date Value Ref Range Status   05/16/2018 " 139 133 - 144 mmol/L Final     Potassium   Date Value Ref Range Status   05/16/2018 3.5 3.4 - 5.3 mmol/L Final     Chloride   Date Value Ref Range Status   05/16/2018 102 94 - 109 mmol/L Final     Carbon Dioxide   Date Value Ref Range Status   05/16/2018 31 20 - 32 mmol/L Final     Anion Gap   Date Value Ref Range Status   05/16/2018 6 3 - 14 mmol/L Final     Glucose   Date Value Ref Range Status   05/16/2018 109 (H) 70 - 99 mg/dL Final     Comment:     Non Fasting     Urea Nitrogen   Date Value Ref Range Status   05/16/2018 10 7 - 30 mg/dL Final     Creatinine   Date Value Ref Range Status   05/16/2018 0.69 0.52 - 1.04 mg/dL Final     GFR Estimate   Date Value Ref Range Status   05/16/2018 >90 >60 mL/min/1.7m2 Final     Comment:     Non  GFR Calc     Calcium   Date Value Ref Range Status   05/16/2018 9.0 8.5 - 10.1 mg/dL Final       ASSESSMENT:              Current medications were reviewed today as discussed above.      Medication Adherence: excellent, no issues identified    Bipolar I Disorder/Depression: Stable per patient.    Insomnia: Stable.     Gout: Stable.     Asthma:  Stable.     Neuropathy: Stable.     DVT: Stable. Pt is meeting goal INR of 2-3.    Edema: Stable.     Leg Pain: Needs Improvement. Pt may benefit from scheduling APAP multiple times per day.    GERD: Stable.     Hypothyroidism: Stable. Last TSH is within normal limits.     Hyperlipidemia: Stable. Pt is on moderate intensity statin which is indicated based on 2013 ACC/AHA guidelines for lipid management.      Supplements: Stable.      PLAN:                  1. Pt to try taking acetaminophen 1000 mg three times daily.     I spent 30 minutes with this patient today. A copy of the visit note was provided to the patient's primary care provider.     Will follow up in 1 year or sooner if needed.    The patient was given a summary of these recommendations as an after visit summary.    Eduardo Marquez, PharmD, BCACP  Medication Therapy  Management Pharmacist  Pager: [phone]

## 2018-08-02 NOTE — MR AVS SNAPSHOT
After Visit Summary   8/2/2018    Bia Bill    MRN: [id number]           Patient Information     Date Of Birth          1965        Visit Information        Provider Department      8/2/2018 12:30 PM Raulito Marquez, [facility] Instructions    Recommendations from today's MTM visit:                                                    MTM (medication therapy management) is a service provided by a clinical pharmacist designed to help you get the most of out of your medicines.   Today we reviewed what your medicines are for, how to know if they are working, that your medicines are safe and how to make your medicine regimen as easy as possible.     1. You can try taking acetaminophen 1000 mg three times daily SCHEDULED (two extra strength Tylenol tablets three times daily).  Do not exceed 1000 mg of acetaminophen per dose or 3000 mg of acetaminophen total per day.  Sometimes you may be given other medications with acetaminophen in them so you need to be careful to watch for this and reduce you acetaminophen usage accordingly.     2. Remark your levothyroxine bottle so you take before your other morning medications.    Next MTM visit: 1 year or sooner if needed    To schedule another MTM appointment, please call the clinic directly or you may call the MTM scheduling line at [phone] or toll-free at [phone].     My Clinical Pharmacist's contact information:                                                      It was a pleasure seeing you today!  Please feel free to contact me with any questions or concerns you have.      Eduardo Marquez, PharmD, [facility]  Medication Therapy Management Pharmacist  Pager: [phone]    You may receive a survey about the MTM services you received.  I would appreciate your feedback to help me serve you better in the future. Please fill it out and return it when you can. Your comments will be anonymous.                 Follow-ups after your visit        Your next 10 appointments already scheduled     Aug 02, 2018  1:30 PM CDT   Ortho Treatment with Joseline Madera PT   [facility] ([facility])    [address] [place] [address]   [phone]            Aug 21, 2018  6:00 PM CDT   Return Visit with Kd Morris   [facility] ([facility])    [address] [address]   [phone]            Sep 04, 2018  9:30 AM CDT   Return Visit with Kd Morris   [facility] ([facility])    [address] [address]   [phone]            Sep 21, 2018  1:00 PM CDT   LAB with [facility] ([facility])    [address] [address]   [phone]           Please do not eat 10-12 hours before your appointment if you are coming in fasting for labs on lipids, cholesterol, or glucose (sugar). This does not apply to pregnant women. Water, hot tea and black coffee (with nothing added) are okay. Do not drink other fluids, diet soda or chew gum.            Sep 28, 2018  1:30 PM CDT   Return Visit with Joshua Watts MD   [facility] ([facility])    [facility]  [address]  [facility] [address]   [phone]              Who to contact     If you have questions or need follow up information about today's clinic visit or your schedule please contact [facility] directly at [phone].  Normal or non-critical lab and imaging results will be communicated to you by MyChart, letter or phone within 4 business days after the clinic has received the results. If you do not hear from us within 7 days, please contact the clinic through MyChart or phone. If you have a critical or abnormal lab result, we will notify you by phone as soon as possible.  Submit refill requests  through [employer] or call your pharmacy and they will forward the refill request to us. Please allow 3 business days for your refill to be completed.          Additional Information About Your Visit        Care EveryWhere ID     This is your Care EveryWhere ID. This could be used by other organizations to access your [place] medical records  [id number]        Your Vitals Were     Last Period                   09/27/2009            Blood Pressure from Last 3 Encounters:   07/10/18 130/76   06/13/18 138/77   05/31/18 113/73    Weight from Last 3 Encounters:   07/10/18 230 lb (104.3 kg)   06/13/18 232 lb (105.2 kg)   05/31/18 231 lb 3.2 oz (104.9 kg)              Today, you had the following     No orders found for display       Primary Care Provider Office Phone # Fax #    Kd Leong [phone] [address] [facility] [address]        Equal Access to Services     DORIAN [facility] MeridianASUNCION : Hadii jose ku hadasho Sojossy, waaxda luqadaha, qaybta kaalmada adeegyada, nitin roberts . So [facility] [phone].    ATENCIÓN: Si habla español, tiene a terry disposición servicios gratuitos de asistencia lingüística. [facility] [phone].    We comply with applicable federal civil rights laws and [place] laws. We do not discriminate on the basis of race, color, national origin, age, disability, sex, sexual orientation, or gender identity.            Thank you!     Thank you for choosing [facility]  for your care. Our goal is always to provide you with excellent care. Hearing back from our patients is one way we can continue to improve our services. Please take a few minutes to complete the written survey that you may receive in the mail after your visit with us. Thank you!             Your Updated Medication List - Protect others around you: Learn how to safely use, store and throw away your medicines at www.disposemymeds.org.          This list is accurate as of  8/2/18 12:50 PM.  Always use your most recent med list.                   Brand Name Dispense Instructions for use Diagnosis    acetaminophen 500 MG tablet    TYLENOL     Take 500-1,000 mg by mouth every 6 hours as needed for mild pain        * albuterol 108 (90 Base) MCG/ACT Inhaler    PROAIR HFA/PROVENTIL HFA/VENTOLIN HFA    3 Inhaler    Inhale 2 puffs into the lungs every 6 hours as needed for shortness of breath / dyspnea or wheezing    Mild persistent asthma without complication       * albuterol (2.5 MG/3ML) 0.083% neb solution     1 Box    Take 1 vial (2.5 mg) by nebulization every 6 hours as needed for shortness of breath / dyspnea or wheezing    Mild persistent asthma without complication       allopurinol 100 MG tablet    ZYLOPRIM    30 tablet    TAKE ONE TABLET BY MOUTH ONE TIME DAILY    Acute gouty arthritis       ARIPiprazole 5 MG tablet    ABILIFY    45 tablet    TAKE 1.5 TABLETS (7.5 MG) BY MOUTH AT BEDTIME    Major depressive disorder, recurrent episode, moderate (H)       buPROPion 150 MG 24 hr tablet    WELLBUTRIN XL    30 tablet    TAKE  ONE TABLET BY MOUTH EVERY DAY IN THE MORNING    COPD exacerbation (H)       cholecalciferol 1000 UNIT tablet    vitamin D3     Take 1,000 Units by mouth daily        EPINEPHrine 0.3 MG/0.3ML injection 2-pack    EPIPEN/ADRENACLICK/or ANY BX GENERIC EQUIV    0.6 mL    Inject 0.3 mLs (0.3 mg) into the muscle once as needed for anaphylaxis    Anaphylactic reaction to bee sting, undetermined intent, subsequent encounter       furosemide 20 MG tablet    LASIX    180 tablet    TAKE TWO TABLETS BY MOUTH TWICE DAILY    Lymphedema of both lower extremities       * gabapentin 300 MG capsule    NEURONTIN    90 capsule    ONE CAPSULE BY MOUTH AT BEDTIME WITH 600 MG CAPSULE- FOR TOTAL  MG AT BEDTIME    Polyneuropathy in other diseases classified elsewhere (H)       * gabapentin 600 MG tablet    NEURONTIN    90 tablet    TAKE ONE TABLET BY MOUTH THREE TIMES DAILY     "Polyneuropathy in other diseases classified elsewhere (H)       ipratropium - albuterol 0.5 mg/2.5 mg/3 mL 0.5-2.5 (3) MG/3ML neb solution    DUONEB    30 vial    Take 1 vial (3 mLs) by nebulization every 6 hours as needed for shortness of breath / dyspnea or wheezing    Chronic obstructive pulmonary disease, unspecified COPD type (H)       lamoTRIgine 200 MG tablet    LaMICtal    30 tablet    TAKE 1 TABLET (200 MG) BY MOUTH DAILY    Major depressive disorder, recurrent episode, moderate (H)       levothyroxine 150 MCG tablet    SYNTHROID/LEVOTHROID    90 tablet    Take 1 tablet (150 mcg) by mouth daily    Hypothyroidism, unspecified type       mometasone-formoterol 200-5 MCG/ACT oral inhaler    DULERA    13 g    Inhale 2 puffs into the lungs 2 times daily    COPD exacerbation (H), Chronic obstructive pulmonary disease, unspecified COPD type (H)       omeprazole 20 MG CR capsule    priLOSEC    180 capsule    TAKE ONE CAPSULE BY MOUTH TWICE DAILY    Gastroesophageal reflux disease without esophagitis       * order for DME      Equipment being ordered: CPAP AIRSENSE 10 5-18 CM H20 # [id number]   DN# [id number]        * order for DME     1 Month    Equipment being ordered: INCONTINENCE PADS  QID    Other urinary incontinence       * order for DME     60 Month    Equipment being ordered: DEPENDS SIZE LARGE    Mixed incontinence       * order for DME     2 Units    Equipment being ordered: [id number]\" x2pair   \"20-30mmHg Farrow Hybrid Liners\" x 2 pair    Peripheral edema       order for DME     1 Device    Equipment being ordered: Nebulizer    COPD exacerbation (H), Chronic obstructive pulmonary disease, unspecified COPD type (H)       order for DME     1 each    Equipment being ordered:x2 [employer] 30/40mmHg compression wraps with x2 extra prs of compression liners    Secondary lymphedema       potassium chloride SA 10 MEQ CR tablet    K-DUR/KLOR-CON M    30 tablet    TAKE ONE TABLET BY MOUTH ONE TIME DAILY    Lymphedema "       pramipexole 0.125 MG tablet    MIRAPEX    60 tablet    TAKE 1-2 TABLETS (0.125-0.25 MG) BY MOUTH AT BEDTIME    Restless leg syndrome       simvastatin 20 MG tablet    ZOCOR    90 tablet    TAKE ONE TABLET BY MOUTH AT BEDTIME    Hypercholesteremia       traZODone 150 MG tablet    DESYREL    30 tablet    Take 1 tablet (150 mg) by mouth At Bedtime    Major depressive disorder, recurrent episode, moderate (H)       warfarin 5 MG tablet    COUMADIN    180 tablet    Take 10 mg on Mon, Wed, Fri; 12.5 mg all other days or as directed by the Anticoagulation Clinic    DVT (deep venous thrombosis) (H)       * Notice:  This list has 8 medication(s) that are the same as other medications prescribed for you. Read the directions carefully, and ask your doctor or other care provider to review them with you.

## 2018-08-04 DIAGNOSIS — F33.1 MAJOR DEPRESSIVE DISORDER, RECURRENT EPISODE, MODERATE (H): ICD-10-CM

## 2018-08-06 RX ORDER — TRAZODONE HYDROCHLORIDE 150 MG/1
TABLET ORAL
Qty: 30 TABLET | Refills: 0 | Status: SHIPPED | OUTPATIENT
Start: 2018-08-06 | End: 2018-09-30

## 2018-08-14 DIAGNOSIS — G63 POLYNEUROPATHY IN OTHER DISEASES CLASSIFIED ELSEWHERE (H): Chronic | ICD-10-CM

## 2018-08-14 DIAGNOSIS — I89.0 LYMPHEDEMA: ICD-10-CM

## 2018-08-14 NOTE — TELEPHONE ENCOUNTER
GABAPENTIN 600 MG TAB ASCE        Last Written Prescription Date:  7/16/18  Last Fill Quantity: 90,   # refills: 0  Last Office Visit: 5/31/18  Future Office visit:    Next 5 appointments (look out 90 days)     Aug 15, 2018  1:20 PM CDT   [facility] LEONIDES [facility] C[facility]    [address] [place] [address]   [phone]            Aug 21, 2018  6:00 PM CDT   Return Visit with Kd Morris   [facility] ([facility])    [address] [facility] [address]   [phone]            Aug 22, 2018  1:00 PM CDT   Return Visit with Rochelle Nicholas MD   [facility] [place] ([facility])    [address]  [address]  [facility] [place] [place] [address]   [phone]            Sep 04, 2018  9:30 AM CDT   Return Visit with Kd Morris   [facility] ([facility])    [address] [facility] [address]   [phone]            Sep 28, 2018  1:30 PM CDT   Return Visit with Joshua Watts MD   [facility] ([facility])    [facility]  [address]  [facility] [place] [place] [address]   [phone]                   potassium chloride SA (K-DUR/KLOR-CON M) 10 MEQ CR tablet      Last Written Prescription Date:  7/11/18  Last Fill Quantity: 30,   # refills: 0  Last Office Visit: 5/31/18  Future Office visit:    Next 5 appointments (look out 90 days)     Aug 15, 2018  1:20 PM CDT   SHORT with LEONIDES Dover CNP   [facility]    [address] [place] [address]   [phone]            Aug 21, 2018  6:00 PM CDT   Return Visit with Kd Morris   [facility] ([facility])    [address] [facility] [address]   [phone]            Aug 22, 2018  1:00 PM CDT   Return Visit with Rochelle  "MD Yu   [facility] [place] ([facility])    [address]  [place] [address]   [phone]            Sep 04, 2018  9:30 AM CDT   Return Visit with Kd Morris   [facility] ([facility])    [address]  [place] [address]   [phone]            Sep 28, 2018  1:30 PM CDT   Return Visit with Joshua Watts MD   [facility] ([facility])    [facility]  [address]  [facility] [address]   [phone]                   Requested Prescriptions   Pending Prescriptions Disp Refills     gabapentin (NEURONTIN) 600 MG tablet [Pharmacy Med Name: GABAPENTIN 600 MG   TAB ASCE] 90 tablet 0     Sig: TAKE ONE TABLET BY MOUTH THREE TIMES DAILY    There is no refill protocol information for this order        potassium chloride SA (K-DUR/KLOR-CON M) 10 MEQ CR tablet [Pharmacy Med Name: POT CL MICRO 10 MEQ TAB SAND] 30 tablet 0     Sig: TAKE ONE TABLET BY MOUTH ONE TIME DAILY    Potassium Supplements Protocol Passed    8/14/2018 12:10 PM       Passed - Recent (12 mo) or future (30 days) visit within the authorizing provider's specialty    Patient had office visit in the last 12 months or has a visit in the next 30 days with authorizing provider or within the authorizing provider's specialty.  See \"Patient Info\" tab in inbasket, or \"Choose Columns\" in Meds & Orders section of the refill encounter.           Passed - Patient is age 18 or older       Passed - Normal serum potassium in past 12 months    Recent Labs   Lab Test  05/16/18   1043   POTASSIUM  3.5                      "

## 2018-08-15 ENCOUNTER — OFFICE VISIT (OUTPATIENT)
Dept: FAMILY MEDICINE | Facility: CLINIC | Age: 53
End: 2018-08-15
Payer: COMMERCIAL

## 2018-08-15 VITALS
BODY MASS INDEX: 48.4 KG/M2 | WEIGHT: 231.6 LBS | OXYGEN SATURATION: 96 % | TEMPERATURE: 100 F | RESPIRATION RATE: 16 BRPM | HEART RATE: 80 BPM | SYSTOLIC BLOOD PRESSURE: 108 MMHG | DIASTOLIC BLOOD PRESSURE: 62 MMHG

## 2018-08-15 DIAGNOSIS — M25.562 ACUTE PAIN OF LEFT KNEE: Primary | ICD-10-CM

## 2018-08-15 PROCEDURE — 99214 OFFICE O/P EST MOD 30 MIN: CPT | Performed by: NURSE PRACTITIONER

## 2018-08-15 RX ORDER — LORAZEPAM 1 MG/1
1 TABLET ORAL PRN
Qty: 2 TABLET | Refills: 0 | Status: SHIPPED | OUTPATIENT
Start: 2018-08-15 | End: 2018-09-06

## 2018-08-15 RX ORDER — LIDOCAINE 50 MG/G
OINTMENT TOPICAL 3 TIMES DAILY PRN
Qty: 240 G | Refills: 1 | Status: ON HOLD | OUTPATIENT
Start: 2018-08-15 | End: 2019-06-07

## 2018-08-15 ASSESSMENT — PAIN SCALES - GENERAL: PAINLEVEL: EXTREME PAIN (9)

## 2018-08-15 NOTE — TELEPHONE ENCOUNTER
Routing refill request to provider for review/approval because:  Please advise refill requests at her appointment today. Thank you.  Mundo Conde RN

## 2018-08-15 NOTE — MR AVS SNAPSHOT
After Visit Summary   8/15/2018    Bia Bill    MRN: [id number]           Patient Information     Date Of Birth          1965        Visit Information        Provider Department      8/15/2018 1:20 PM Renuka Mckenzie APRN [facility]        Today's Diagnoses     Acute pain of left knee    -  1      Care Instructions      Arthralgia    Arthralgia is the term for pain in or around the joint. It is a symptom, not a disease. This pain may involve one or more joints. In some cases, the pain moves from joint to joint.  There are many causes for joint pain. These include:    Injury    Osteoarthritis (wearing out of the joint surface)    Gout (inflammation of the joint due to crystals in the joint fluid)    Infection inside the joint      Bursitis (inflammation of the fluid-filled sacs around the joint)    Autoimmune disorders such as rheumatoid arthritis or lupus    Tendonitis (inflammation of chords that attach muscle to bone)  Home care    Rest the involved joint(s) until your symptoms improve.     You may be prescribed pain medicine. If none is prescribed, you may use acetaminophen or ibuprofen to control pain and inflammation.  Follow-up care  Follow up with your healthcare provider or as advised.  When to seek medical advice  Contact your healthcare provider right away if any of the following occurs:    Pain, swelling, or redness of joint increases    Pain worsens or recurs after a period of improvement    Pain moves to other joints    You cannot bear weight on the affected joint     You cannot move the affected joint    Joint appears deformed    New rash appears    Fever of 100.4 F (38 C) or higher, or as directed by your healthcare provider  Date Last Reviewed: 3/1/2017    3840-0777 [employer]. [address] [place] [address], [place] [address]. All rights reserved. This information is not intended as a substitute for professional medical care. Always  follow your healthcare professional's instructions.        Reducing Knee Pain and Swelling    Many treatments can help reduce pain and swelling in your knee. Your healthcare provider or physical therapist may suggest one or more of the following treatments:    Icing your knee helps reduce swelling. You may be asked to ice your knee once a day or more. Apply ice for about 15 to 20 minutes at a time, with at least 40 minutes between sessions. Always keep a towel between the ice and your skin.     Keeping your leg raised above your heart helps excess fluid flow out of your knee joint. This reduces swelling.    Compression means wrapping an elastic bandage or neoprene sleeve snugly around your knees. This keeps fluid from collecting in your knee joint.    Electrical stimulation, done by a physical therapist or , can help reduce excess fluid in your knee joint.    Anti-inflammatory medicines may be prescribed by your healthcare provider. You may take pills or receive injections in your knee.    Isometric (stiven) exercises strengthen the muscles that support your knee joint. They also help reduce excess fluid in your knee.    Massage helps fluid drain away from your knee.  Date Last Reviewed: 10/13/2015    9983-4723 [employer] [address] [place] [address]. All rights reserved. This information is not intended as a substitute for professional medical care. Always follow your healthcare professional's instructions.        Evaluating Kneecap (Patella) Problems  You can find out what is causing your knee pain by having a thorough evaluation. You may see an orthopedist or a sports medicine healthcare provider, both of whom treat bone, muscle, and joint problems. Your healthcare provider will work with you to find the cause of your knee pain and design a treatment plan for you.  Taking your medical history  Your medical history gives your healthcare provider clues about the  cause of your knee pain. It helps your healthcare provider set goals for your treatment. You ll be asked about your pain and which activities make it worse. Your healthcare provider will also ask about prior knee problems.  Examining your knee  A physical exam helps your healthcare provider locate your specific knee problem. Your healthcare provider will look at and move your knee to find signs of swelling or tenderness. Then he or she will check to see how well your kneecap tracks. Other tests of knee function may also be done.     MRI scan of the knee.     Performing tests  Diagnostic tests may help your healthcare provider learn more about your knee problem.    X-rays show the alignment and position of your bones, including your kneecap, and help detect if a fracture is present. A CT (computed tomography) scan can show more alignment details and can also help to detect the presence of a fracture.    An MRI (magnetic resonance imaging) can show bone, cartilage, ligament, or muscle problems.    Arthroscopy uses a tiny camera to let your healthcare provider see inside your knee joint and possibly repair any damaged tissue. This procedure may be done under general or local anesthesia.  Date Last Reviewed: 10/13/2015    8597-8299 [employer]. [address] [place] [address]. All rights reserved. This information is not intended as a substitute for professional medical care. Always follow your healthcare professional's instructions.                Follow-ups after your visit        Your next 10 appointments already scheduled     Aug 21, 2018  6:00 PM CDT   Return Visit with Kd Morris   [facility] ([facility])    [address] [facility] [address] [phone]            Aug 22, 2018  1:00 PM CDT   Return Visit with Rochelle Nicholas MD   [facility] ([facility])    [address] [facility]  [address] [place] [address]  [address]   [phone]            Sep 04, 2018  9:30 AM CDT   Return Visit with Kd Morris   [facility] ([facility])    [address] [place]  [facility] [address]   [phone]            Sep 21, 2018  1:00 PM CDT   LAB with [facility] ([facility])    [address] [facility] [address]   [phone]           Please do not eat 10-12 hours before your appointment if you are coming in fasting for labs on lipids, cholesterol, or glucose (sugar). This does not apply to pregnant women. Water, hot tea and black coffee (with nothing added) are okay. Do not drink other fluids, diet soda or chew gum.            Sep 28, 2018  1:30 PM CDT   Return Visit with Joshua Watts MD   [facility] ([facility])    [facility]  [address] [facility] [address]  [facility] [address]   [phone]              Future tests that were ordered for you today     Open Future Orders        Priority Expected Expires Ordered    MR Knee Left w/o Contrast Routine  8/15/2019 8/15/2018            Who to contact     If you have questions or need follow up information about today's clinic visit or your schedule please contact [facility] directly at [phone].  Normal or non-critical lab and imaging results will be communicated to you by MyChart, letter or phone within 4 business days after the clinic has received the results. If you do not hear from us within 7 days, please contact the clinic through MyChart or phone. If you have a critical or abnormal lab result, we will notify you by phone as soon as possible.  Submit refill requests through [email or web address] or call your pharmacy and they will forward the refill request to us. Please allow 3 business days for your refill to be completed.          Additional Information About Your Visit        Care EveryWhere ID     This is your Care EveryWhere ID. This could  be used by other organizations to access your [place] medical records  [id number]        Your Vitals Were     Pulse Temperature Respirations Last Period Pulse Oximetry BMI (Body Mass Index)    80 100  F (37.8  C) (Tympanic) 16 09/27/2009 96% 48.4 kg/m2       Blood Pressure from Last 3 Encounters:   08/15/18 108/62   08/02/18 132/74   07/10/18 130/76    Weight from Last 3 Encounters:   08/15/18 231 lb 9.6 oz (105.1 kg)   07/10/18 230 lb (104.3 kg)   06/13/18 232 lb (105.2 kg)                 Today's Medication Changes          These changes are accurate as of 8/15/18  1:56 PM.  If you have any questions, ask your nurse or doctor.               Start taking these medicines.        Dose/Directions    lidocaine 5 % ointment   Commonly known as:  XYLOCAINE   Used for:  Acute pain of left knee   Started by:  Renuka Mckenzie APRN CNP        Apply topically 3 times daily as needed for moderate pain   Quantity:  240 g   Refills:  1       LORazepam 1 MG tablet   Commonly known as:  ATIVAN   Used for:  Acute pain of left knee   Started by:  Renuka Mckenzie APRN CNP        Dose:  1 mg   Take 1 tablet (1 mg) by mouth as needed for anxiety Take 30 minutes prior to procedure.  Do not operate a vehicle after taking this medication   Quantity:  2 tablet   Refills:  0            Where to get your medicines      These medications were sent to [id number] [address] [facility]  [address] [facility] [address]    Hours:  Closed 10-16-08 business to [facility] Phone:  [phone]     lidocaine 5 % ointment         Some of these will need a paper prescription and others can be bought over the counter.  Ask your nurse if you have questions.     Bring a paper prescription for each of these medications     LORazepam 1 MG tablet                Primary Care Provider Office Phone # Fax #    Kd Leong [phone] [address]  [facility] [address]         Equal Access to Services     [facility] Hospital[facility]ION : Hadii jose zamora kristal Clark, wahoada luqadaha, qaybta kajaylenfrancis clay, nitin agustinestebanantelmo craig. So [facility] [phone].    ATENCIÓN: Si habla español, tiene a terry disposición servicios gratuitos de asistencia lingüística. Carlosame al [phone].    We comply with applicable federal civil rights laws and [place] laws. We do not discriminate on the basis of race, color, national origin, age, disability, sex, sexual orientation, or gender identity.            Thank you!     Thank you for choosing [facility]  for your care. Our goal is always to provide you with excellent care. Hearing back from our patients is one way we can continue to improve our services. Please take a few minutes to complete the written survey that you may receive in the mail after your visit with us. Thank you!             Your Updated Medication List - Protect others around you: Learn how to safely use, store and throw away your medicines at www.disposemymeds.org.          This list is accurate as of 8/15/18  1:56 PM.  Always use your most recent med list.                   Brand Name Dispense Instructions for use Diagnosis    acetaminophen 500 MG tablet    TYLENOL     Take 500-1,000 mg by mouth every 6 hours as needed for mild pain        * albuterol 108 (90 Base) MCG/ACT inhaler    PROAIR HFA/PROVENTIL HFA/VENTOLIN HFA    3 Inhaler    Inhale 2 puffs into the lungs every 6 hours as needed for shortness of breath / dyspnea or wheezing    Mild persistent asthma without complication       * albuterol (2.5 MG/3ML) 0.083% neb solution     1 Box    Take 1 vial (2.5 mg) by nebulization every 6 hours as needed for shortness of breath / dyspnea or wheezing    Mild persistent asthma without complication       allopurinol 100 MG tablet    ZYLOPRIM    30 tablet    TAKE ONE TABLET BY MOUTH ONE TIME DAILY    Acute gouty arthritis       ARIPiprazole 5 MG tablet    ABILIFY     45 tablet    TAKE 1.5 TABLETS (7.5 MG) BY MOUTH AT BEDTIME    Major depressive disorder, recurrent episode, moderate (H)       buPROPion 150 MG 24 hr tablet    WELLBUTRIN XL    30 tablet    TAKE  ONE TABLET BY MOUTH EVERY DAY IN THE MORNING    COPD exacerbation (H)       cholecalciferol 1000 UNIT tablet    vitamin D3     Take 1,000 Units by mouth daily        EPINEPHrine 0.3 MG/0.3ML injection 2-pack    EPIPEN/ADRENACLICK/or ANY BX GENERIC EQUIV    0.6 mL    Inject 0.3 mLs (0.3 mg) into the muscle once as needed for anaphylaxis    Anaphylactic reaction to bee sting, undetermined intent, subsequent encounter       furosemide 20 MG tablet    LASIX    180 tablet    TAKE TWO TABLETS BY MOUTH TWICE DAILY    Lymphedema of both lower extremities       * gabapentin 300 MG capsule    NEURONTIN    90 capsule    ONE CAPSULE BY MOUTH AT BEDTIME WITH 600 MG CAPSULE- FOR TOTAL  MG AT BEDTIME    Polyneuropathy in other diseases classified elsewhere (H)       * gabapentin 600 MG tablet    NEURONTIN    90 tablet    TAKE ONE TABLET BY MOUTH THREE TIMES DAILY    Polyneuropathy in other diseases classified elsewhere (H)       ipratropium - albuterol 0.5 mg/2.5 mg/3 mL 0.5-2.5 (3) MG/3ML neb solution    DUONEB    30 vial    Take 1 vial (3 mLs) by nebulization every 6 hours as needed for shortness of breath / dyspnea or wheezing    Chronic obstructive pulmonary disease, unspecified COPD type (H)       lamoTRIgine 200 MG tablet    LaMICtal    30 tablet    TAKE 1 TABLET (200 MG) BY MOUTH DAILY    Major depressive disorder, recurrent episode, moderate (H)       levothyroxine 150 MCG tablet    SYNTHROID/LEVOTHROID    90 tablet    Take 1 tablet (150 mcg) by mouth daily    Hypothyroidism, unspecified type       lidocaine 5 % ointment    XYLOCAINE    240 g    Apply topically 3 times daily as needed for moderate pain    Acute pain of left knee       LORazepam 1 MG tablet    ATIVAN    2 tablet    Take 1 tablet (1 mg) by mouth as needed for  "anxiety Take 30 minutes prior to procedure.  Do not operate a vehicle after taking this medication    Acute pain of left knee       mometasone-formoterol 200-5 MCG/ACT oral inhaler    DULERA    13 g    Inhale 2 puffs into the lungs 2 times daily    COPD exacerbation (H), Chronic obstructive pulmonary disease, unspecified COPD type (H)       omeprazole 20 MG CR capsule    priLOSEC    180 capsule    TAKE ONE CAPSULE BY MOUTH TWICE DAILY    Gastroesophageal reflux disease without esophagitis       * order for DME      Equipment being ordered: CPAP AIRSENSE 10 5-18 CM H20 # [id number]   DN# [id number]        * order for DME     1 Month    Equipment being ordered: INCONTINENCE PADS  QID    Other urinary incontinence       * order for DME     60 Month    Equipment being ordered: DEPENDS SIZE LARGE    Mixed incontinence       * order for DME     2 Units    Equipment being ordered: [id number]\" x2pair   \"20-30mmHg F[employer] Hybrid Liners\" x 2 pair    Peripheral edema       order for DME     1 Device    Equipment being ordered: Nebulizer    COPD exacerbation (H), Chronic obstructive pulmonary disease, unspecified COPD type (H)       order for DME     1 each    Equipment being ordered:x2 [employer] 30/40mmHg compression wraps with x2 extra prs of compression liners    Secondary lymphedema       potassium chloride SA 10 MEQ CR tablet    K-DUR/KLOR-CON M    30 tablet    TAKE ONE TABLET BY MOUTH ONE TIME DAILY    Lymphedema       pramipexole 0.125 MG tablet    MIRAPEX    60 tablet    TAKE 1-2 TABLETS (0.125-0.25 MG) BY MOUTH AT BEDTIME    Restless leg syndrome       simvastatin 20 MG tablet    ZOCOR    90 tablet    TAKE ONE TABLET BY MOUTH AT BEDTIME    Hypercholesteremia       traZODone 150 MG tablet    DESYREL    30 tablet    TAKE ONE TABLET BY MOUTH NIGHTLY AT BEDTIME    Major depressive disorder, recurrent episode, moderate (H)       warfarin 5 MG tablet    COUMADIN    180 tablet    Take 10 mg on Mon, Wed, Fri; 12.5 mg all other " days or as directed by the Anticoagulation Clinic    DVT (deep venous thrombosis) (H)       * Notice:  This list has 8 medication(s) that are the same as other medications prescribed for you. Read the directions carefully, and ask your doctor or other care provider to review them with you.

## 2018-08-15 NOTE — PATIENT INSTRUCTIONS
Arthralgia    Arthralgia is the term for pain in or around the joint. It is a symptom, not a disease. This pain may involve one or more joints. In some cases, the pain moves from joint to joint.  There are many causes for joint pain. These include:    Injury    Osteoarthritis (wearing out of the joint surface)    Gout (inflammation of the joint due to crystals in the joint fluid)    Infection inside the joint      Bursitis (inflammation of the fluid-filled sacs around the joint)    Autoimmune disorders such as rheumatoid arthritis or lupus    Tendonitis (inflammation of chords that attach muscle to bone)  Home care    Rest the involved joint(s) until your symptoms improve.     You may be prescribed pain medicine. If none is prescribed, you may use acetaminophen or ibuprofen to control pain and inflammation.  Follow-up care  Follow up with your healthcare provider or as advised.  When to seek medical advice  Contact your healthcare provider right away if any of the following occurs:    Pain, swelling, or redness of joint increases    Pain worsens or recurs after a period of improvement    Pain moves to other joints    You cannot bear weight on the affected joint     You cannot move the affected joint    Joint appears deformed    New rash appears    Fever of 100.4 F (38 C) or higher, or as directed by your healthcare provider  Date Last Reviewed: 3/1/2017    9033-7734 [employer]. [address] [place] [address], [place] [address]. All rights reserved. This information is not intended as a substitute for professional medical care. Always follow your healthcare professional's instructions.        Reducing Knee Pain and Swelling    Many treatments can help reduce pain and swelling in your knee. Your healthcare provider or physical therapist may suggest one or more of the following treatments:    Icing your knee helps reduce swelling. You may be asked to ice your knee once a day or more. Apply ice for about 15 to  20 minutes at a time, with at least 40 minutes between sessions. Always keep a towel between the ice and your skin.     Keeping your leg raised above your heart helps excess fluid flow out of your knee joint. This reduces swelling.    Compression means wrapping an elastic bandage or neoprene sleeve snugly around your knees. This keeps fluid from collecting in your knee joint.    Electrical stimulation, done by a physical therapist or , can help reduce excess fluid in your knee joint.    Anti-inflammatory medicines may be prescribed by your healthcare provider. You may take pills or receive injections in your knee.    Isometric (stiven) exercises strengthen the muscles that support your knee joint. They also help reduce excess fluid in your knee.    Massage helps fluid drain away from your knee.  Date Last Reviewed: 10/13/2015    4948-8717 [employer]. [address] [place], [place] [address]. All rights reserved. This information is not intended as a substitute for professional medical care. Always follow your healthcare professional's instructions.        Evaluating Kneecap (Patella) Problems  You can find out what is causing your knee pain by having a thorough evaluation. You may see an orthopedist or a sports medicine healthcare provider, both of whom treat bone, muscle, and joint problems. Your healthcare provider will work with you to find the cause of your knee pain and design a treatment plan for you.  Taking your medical history  Your medical history gives your healthcare provider clues about the cause of your knee pain. It helps your healthcare provider set goals for your treatment. You ll be asked about your pain and which activities make it worse. Your healthcare provider will also ask about prior knee problems.  Examining your knee  A physical exam helps your healthcare provider locate your specific knee problem. Your healthcare provider will look at and move your knee  to find signs of swelling or tenderness. Then he or she will check to see how well your kneecap tracks. Other tests of knee function may also be done.     MRI scan of the knee.     Performing tests  Diagnostic tests may help your healthcare provider learn more about your knee problem.    X-rays show the alignment and position of your bones, including your kneecap, and help detect if a fracture is present. A CT (computed tomography) scan can show more alignment details and can also help to detect the presence of a fracture.    An MRI (magnetic resonance imaging) can show bone, cartilage, ligament, or muscle problems.    Arthroscopy uses a tiny camera to let your healthcare provider see inside your knee joint and possibly repair any damaged tissue. This procedure may be done under general or local anesthesia.  Date Last Reviewed: 10/13/2015    3050-3570 [employer]. [address] [place] [address], [place] [address]. All rights reserved. This information is not intended as a substitute for professional medical care. Always follow your healthcare professional's instructions.

## 2018-08-15 NOTE — PROGRESS NOTES
SUBJECTIVE:   Bia Bill is a 52 year old female who presents to clinic today for the following health issues:      Musculoskeletal problem/pain      Duration: Has been ongoing for the past month, getting worse with time    Description  Location: Both knees- Left is worse then the right    Intensity:  moderate    Accompanying signs and symptoms: radiation of pain to feet, numbness, weakness of legs and swelling    History  Previous similar problem: YES  Previous evaluation:  none    Precipitating or alleviating factors:  Trauma or overuse: no   Aggravating factors include: sitting and standing    Therapies tried and outcome: gel shot to the left knee- didn't help    Synvisc injection left knee ineffective 7/10/2018 injection done. Dr Medel  Previous steroid injections into the right shoulder ineffective in the past  8/22/2018 follow up with Ortho scheduled.   Taking 10 tablets of Tylenol a day with no relief of pain.  Nothing topical at this time.   Physical Therapy for left knee pain ineffective    BP Readings from Last 3 Encounters:   08/15/18 108/62   08/02/18 132/74   07/10/18 130/76       -------------------------------------    Problem list and histories reviewed & adjusted, as indicated.  Additional history: as documented    Labs reviewed in EPIC    Reviewed and updated as needed this visit by clinical staff  Tobacco  Allergies  Meds  Med Hx  Surg Hx  Fam Hx  Soc Hx      Reviewed and updated as needed this visit by Provider         ROS:   ROS: 10 point ROS neg other than the symptoms noted above in the HPI.      OBJECTIVE:                                                    /62  Pulse 80  Temp 100  F (37.8  C) (Tympanic)  Resp 16  Wt 231 lb 9.6 oz (105.1 kg)  LMP 09/27/2009  SpO2 96%  BMI 48.4 kg/m2  Body mass index is 48.4 kg/(m^2).   GENERAL: healthy, alert, well nourished, well hydrated, no distress  HENT: ear canals- normal; TMs- normal; Nose- normal; Mouth- no ulcers, no  lesions  NECK: no tenderness, no adenopathy, no asymmetry, no masses, no stiffness; thyroid- normal to palpation  RESP: lungs clear to auscultation - no rales, no rhonchi, no wheezes  CV: regular rates and rhythm, normal S1 S2, no S3 or S4 and no murmur, no click or rub -  ABDOMEN: soft, no tenderness, no  hepatosplenomegaly, no masses, normal bowel sounds  MS: extremities- no gross deformities noted, mild  edema  range of motion limited due to pain   No ligament instability    Diagnostic test results:  .BOTH KNEES THREE VIEWS EACH SIDE  6/13/2018 9:40 AM      HISTORY:  Chronic pain of both knees     COMPARISON: None.         IMPRESSION:  1. Right knee:  Normal.  2. Left knee: Normal.     COLLETTE GARCIA MD     ASSESSMENT/PLAN:                                                    1. Acute pain of left knee    Ongoing Pain.  Recommend MRI and Ortho follow-up  - MR Knee Left w/o Contrast; Future  - LORazepam (ATIVAN) 1 MG tablet; Take 1 tablet (1 mg) by mouth as needed for anxiety Take 30 minutes prior to procedure.  Do not operate a vehicle after taking this medication  Dispense: 2 tablet; Refill: 0  - lidocaine (XYLOCAINE) 5 % ointment; Apply topically 3 times daily as needed for moderate pain  Dispense: 240 g; Refill: 1      Follow up with Provider -  Primary Care Provider with ongoing symptoms.  Call or return to the clinic with any worsening of symptoms or no resolution. Patient/Parent verbalized understanding and is in agreement. Medication side effects reviewed.   Current Outpatient Prescriptions   Medication Sig Dispense Refill     acetaminophen (TYLENOL) 500 MG tablet Take 500-1,000 mg by mouth every 6 hours as needed for mild pain       albuterol (2.5 MG/3ML) 0.083% neb solution Take 1 vial (2.5 mg) by nebulization every 6 hours as needed for shortness of breath / dyspnea or wheezing 1 Box 0     albuterol (PROAIR HFA/PROVENTIL HFA/VENTOLIN HFA) 108 (90 BASE) MCG/ACT Inhaler Inhale 2 puffs into the lungs every 6  hours as needed for shortness of breath / dyspnea or wheezing 3 Inhaler 1     allopurinol (ZYLOPRIM) 100 MG tablet TAKE ONE TABLET BY MOUTH ONE TIME DAILY 30 tablet 1     buPROPion (WELLBUTRIN XL) 150 MG 24 hr tablet TAKE  ONE TABLET BY MOUTH EVERY DAY IN THE MORNING 30 tablet 0     cholecalciferol (VITAMIN D3) 1000 UNIT tablet Take 1,000 Units by mouth daily       furosemide (LASIX) 20 MG tablet TAKE TWO TABLETS BY MOUTH TWICE DAILY 180 tablet 2     gabapentin (NEURONTIN) 300 MG capsule ONE CAPSULE BY MOUTH AT BEDTIME WITH 600 MG CAPSULE- FOR TOTAL  MG AT BEDTIME 90 capsule 2     ipratropium - albuterol 0.5 mg/2.5 mg/3 mL (DUONEB) 0.5-2.5 (3) MG/3ML neb solution Take 1 vial (3 mLs) by nebulization every 6 hours as needed for shortness of breath / dyspnea or wheezing 30 vial 1     lamoTRIgine (LAMICTAL) 200 MG tablet TAKE 1 TABLET (200 MG) BY MOUTH DAILY 30 tablet 2     levothyroxine (SYNTHROID/LEVOTHROID) 150 MCG tablet Take 1 tablet (150 mcg) by mouth daily 90 tablet 2     lidocaine (XYLOCAINE) 5 % ointment Apply topically 3 times daily as needed for moderate pain 240 g 1     LORazepam (ATIVAN) 1 MG tablet Take 1 tablet (1 mg) by mouth as needed for anxiety Take 30 minutes prior to procedure.  Do not operate a vehicle after taking this medication 2 tablet 0     mometasone-formoterol (DULERA) 200-5 MCG/ACT oral inhaler Inhale 2 puffs into the lungs 2 times daily 13 g 1     omeprazole (PRILOSEC) 20 MG CR capsule TAKE ONE CAPSULE BY MOUTH TWICE DAILY 180 capsule 3     simvastatin (ZOCOR) 20 MG tablet TAKE ONE TABLET BY MOUTH AT BEDTIME 90 tablet 1     traZODone (DESYREL) 150 MG tablet TAKE ONE TABLET BY MOUTH NIGHTLY AT BEDTIME 30 tablet 0     warfarin (COUMADIN) 5 MG tablet Take 10 mg on Mon, Wed, Fri; 12.5 mg all other days or as directed by the Anticoagulation Clinic  180 tablet 2     [DISCONTINUED] ARIPiprazole (ABILIFY) 5 MG tablet TAKE 1.5 TABLETS (7.5 MG) BY MOUTH AT BEDTIME 45 tablet 2     ARIPiprazole  "(ABILIFY) 5 MG tablet TAKE 1 & 1/2 TABLETS BY MOUTH AT BEDTIME 45 tablet 1     cephALEXin (KEFLEX) 500 MG capsule Take 1 capsule (500 mg) by mouth 4 times daily for 7 days 28 capsule 0     EPINEPHrine (EPIPEN/ADRENACLICK/OR ANY BX GENERIC EQUIV) 0.3 MG/0.3ML injection 2-pack Inject 0.3 mLs (0.3 mg) into the muscle once as needed for anaphylaxis (Patient not taking: Reported on 8/15/2018) 0.6 mL 0     gabapentin (NEURONTIN) 600 MG tablet TAKE ONE TABLET BY MOUTH THREE TIMES DAILY 90 tablet 0     order for DME Equipment being ordered:x2 [employer] 30/40mmHg compression wraps with x2 extra prs of compression liners 1 each 0     order for DME Equipment being ordered: Nebulizer 1 Device 0     order for DME Equipment being ordered: CPAP  AIRSENSE 10  5-18 CM H20  SN# [id number]   DN# 539       order for DME Equipment being ordered: [id number]\" x2pair     \"20-30mmHg [employer] Hybrid Liners\" x 2 pair 2 Units 11     order for DME Equipment being ordered: DEPENDS SIZE LARGE 60 Month 5     order for DME Equipment being ordered: INCONTINENCE PADS   QID 1 Month 11     potassium chloride SA (K-DUR/KLOR-CON M) 10 MEQ CR tablet TAKE ONE TABLET BY MOUTH ONE TIME DAILY 30 tablet 0     pramipexole (MIRAPEX) 0.125 MG tablet TAKE 1-2 TABLETS BY MOUTH AT BEDTIME 60 tablet 10        See Patient Instructions    [facility]    "

## 2018-08-15 NOTE — NURSING NOTE
Chief Complaint   Patient presents with     Knee Pain     Both knees have been causing pain with the left being worse then the right, pain has been constant for the past month, hurts worse with walking, or sitting too long.      Denise LUGO CMA

## 2018-08-19 RX ORDER — GABAPENTIN 600 MG/1
TABLET ORAL
Qty: 90 TABLET | Refills: 0 | Status: SHIPPED | OUTPATIENT
Start: 2018-08-19 | End: 2018-09-17

## 2018-08-19 RX ORDER — POTASSIUM CHLORIDE 750 MG/1
TABLET, EXTENDED RELEASE ORAL
Qty: 30 TABLET | Refills: 0 | Status: SHIPPED | OUTPATIENT
Start: 2018-08-19 | End: 2018-09-17

## 2018-08-21 ENCOUNTER — OFFICE VISIT (OUTPATIENT)
Dept: PSYCHOLOGY | Facility: CLINIC | Age: 53
End: 2018-08-21
Payer: COMMERCIAL

## 2018-08-21 DIAGNOSIS — F31.62 MODERATE MIXED BIPOLAR I DISORDER (H): Primary | ICD-10-CM

## 2018-08-21 PROCEDURE — 90834 PSYTX W PT 45 MINUTES: CPT | Performed by: PSYCHOLOGIST

## 2018-08-21 NOTE — MR AVS SNAPSHOT
MRN:[id number]                      After Visit Summary   8/21/2018    Bia Bill    MRN: [id number]           Visit Information        Provider Department      8/21/2018 6:00 PM Arturo Kd CHAWLA [facility]      Your next 10 appointments already scheduled     Sep 04, 2018  9:30 AM CDT   Return Visit with Kd Morris   [facility] ([facility])    [address] [facility] [address]   [phone]            Sep 21, 2018 12:00 PM CDT   Return Visit with Kd Morris   [facility] ([facility])    [address] [facility] [address]   [phone]            Sep 21, 2018  1:00 PM CDT   LAB with [facility] ([facility])    [address] [facility] [address]   [phone]           Please do not eat 10-12 hours before your appointment if you are coming in fasting for labs on lipids, cholesterol, or glucose (sugar). This does not apply to pregnant women. Water, hot tea and black coffee (with nothing added) are okay. Do not drink other fluids, diet soda or chew gum.            Sep 28, 2018  1:30 PM CDT   Return Visit with Joshua Watts MD   [facility] ([facility])    [facility]  [address]  [facility] [facility] [address]   [phone]              Care EveryWhere ID     This is your Care EveryWhere ID. This could be used by other organizations to access your [facility] medical records  [id number]        Equal Access to Services     ANTIONETTE IRBY : Hadii aad ku hadasho Soomaali, waaxda luqadaha, qaybta kaalmada adeegyada, nitin craig. So [facility] [phone].    ATENCIÓN: Si habla español, tiene a terry disposición servicios gratuitos de asistencia lingüística. Carlosame al [phone].    We comply with applicable federal civil rights laws and [place] laws.  We do not discriminate on the basis of race, color, national origin, age, disability, sex, sexual orientation, or gender identity.

## 2018-08-22 ENCOUNTER — OFFICE VISIT (OUTPATIENT)
Dept: ORTHOPEDICS | Facility: CLINIC | Age: 53
End: 2018-08-22
Payer: COMMERCIAL

## 2018-08-22 VITALS
SYSTOLIC BLOOD PRESSURE: 138 MMHG | BODY MASS INDEX: 49.12 KG/M2 | DIASTOLIC BLOOD PRESSURE: 87 MMHG | HEIGHT: 58 IN | WEIGHT: 234 LBS

## 2018-08-22 DIAGNOSIS — M25.561 CHRONIC PAIN OF BOTH KNEES: ICD-10-CM

## 2018-08-22 DIAGNOSIS — M17.0 PRIMARY OSTEOARTHRITIS OF BOTH KNEES: Primary | ICD-10-CM

## 2018-08-22 DIAGNOSIS — M22.2X2 PATELLOFEMORAL PAIN SYNDROME OF BOTH KNEES: ICD-10-CM

## 2018-08-22 DIAGNOSIS — G89.29 CHRONIC PAIN OF BOTH KNEES: ICD-10-CM

## 2018-08-22 DIAGNOSIS — M25.562 CHRONIC PAIN OF BOTH KNEES: ICD-10-CM

## 2018-08-22 DIAGNOSIS — M22.2X1 PATELLOFEMORAL PAIN SYNDROME OF BOTH KNEES: ICD-10-CM

## 2018-08-22 PROCEDURE — 99213 OFFICE O/P EST LOW 20 MIN: CPT | Performed by: PEDIATRICS

## 2018-08-22 NOTE — PATIENT INSTRUCTIONS
Plan:  - Today's Plan of Care:  Continue with Rest, Ice, Compression, and Elevation.  Apply ice 10-15 minutes every 2-3 hours  Call us to review MRI when completed    -We also discussed other future treatment options:  Referral to orthopedic surgeon or Steroid injection of knee    Follow Up: to be determined     If you have any further questions for your physician or physician s care team you can call [phone] and use option 3 to leave a voice message. Calls received during business hours will be returned same day.

## 2018-08-22 NOTE — LETTER
8/22/2018         RE: Bia Bill  [address]  [place] [address]        Dear Colleague,    Thank you for referring your patient, Bia Bill, to the [facility]. Please see a copy of my visit note below.    Sports Medicine Clinic Visit - Interim History August 22, 2018    PCP: Kd Leong    Bia Bill is a 52 year old female who is seen in f/u up for    Primary osteoarthritis of both knees  Chronic pain of both knees  Patellofemoral pain syndrome of both knees.  Since last visit on 6/13/18 patient has received an Synvisc One injection in the left knee from Dr Medel on 7/10/18 as she reports her right knee pain has been improving with physical therapy. She reports the synvisc injection did not help the left knee pain and her knee pain has increase.  No injury in the interim.  Decided on Synvisc as steroid injections haven't helped elsewhere in the past.  Has an MRI scheduled for tomorrow.    Symptoms are better with: nothing  Symptoms are worse with: sitting with knee flexion, standing, stairs and walking  Additional Features:   Positive: swelling, popping, grinding, instability and weakness   Negative: bruising, catching, locking, paresthesias and numbness    Social History:     Review of Systems  Skin: no bruising, no swelling  Musculoskeletal: as above  Neurologic: no numbness, paresthesias  Remainder of review of systems is negative including constitutional, CV, pulmonary, GI, except as noted in HPI or medical history.    Patient's current problem list, past medical and surgical history, and family history were reviewed.    Patient Active Problem List   Diagnosis     Mild persistent asthma     Polyneuropathy in other diseases classified elsewhere (H)     DVT (deep venous thrombosis) (H)     Alcohol abuse, in remission     SECONDARY POLYCYTHEMIA     Chondromalacia of patella     Sensorineural hearing loss, asymmetrical      Developmental reading disorder     Esophageal reflux     Hypothyroidism     Fatty liver     GILES (Obstructive Sleep Apnea)-Moderate (AHI 16)     RLS (restless legs syndrome)     Smoker     Erythrocytosis     Moderate mixed bipolar I disorder (H)     Personality disorder, depressive     COPD (chronic obstructive pulmonary disease) (H)     Current use of long term anticoagulation     Rosacea     Health Care Home     DDD (degenerative disc disease), cervical     Benign neoplasm of colon (POLYPOSIS)     Stroke (H)     Somatization disorder     Sebaceous cyst of right axilla     HTN, goal below 140/90     Left leg pain     Vitamin D deficiency     Long term current use of anticoagulant therapy     Morbid obesity (H)     PVD (peripheral vascular disease) with claudication (H)     Cyst of left ovary     Morbid obesity with alveolar hypoventilation (H)     GI bleed     Severe episode of recurrent major depressive disorder (H)     Conductive hearing loss of left ear with restricted hearing of right ear     Sensorineural hearing loss (SNHL) of right ear with restricted hearing of left ear     Past Medical History:   Diagnosis Date     Acromioclavicular joint arthritis 1/25/2017     Acute bronchospasm 8/15/2016     Acute gouty arthritis 8/4/2014     Anxiety state, unspecified      Bipolar I disorder, most recent episode (or current) unspecified      Closed fracture of metatarsal bone 6/5/2007     Depressive disorder, not elsewhere classified      DVT, lower extremity (H)      Headache 10/17/2014     Impingement syndrome of shoulder region 1/25/2017     Polycythemia, secondary     [place] HGB      Right shoulder pain 10/14/2014     Trochanteric bursitis of both hips 1/11/2016     Past Surgical History:   Procedure Laterality Date     BILIARY STENT SINGLE USE COV      3 stints in left leg     BONE MARROW BIOPSY, BONE SPECIMEN, NEEDLE/TROCAR N/A 11/17/2014    Procedure: BIOPSY BONE MARROW;  Surgeon: Hay Aaron,  "MD;  Location: [facility]     D & C  10/26/09    with uterine ablation     ESOPHAGOSCOPY, GASTROSCOPY, DUODENOSCOPY (EGD), COMBINED  4/21/2014    Procedure: Gastroscopy;  Surgeon: Moris Thomas MD;  Location: [facility]     HYSTERECTOMY, PAP NO LONGER INDICATED  1-4-2010     LITHOTRIPSY  2004    Lithotrypsy     Family History   Problem Relation Age of Onset     Hypertension Mother      Diabetes Mother      Blood Disease Mother      HEART DISEASE Mother      chf     Cerebrovascular Disease Mother      Hypertension Father      Cancer Father      lung cancer     Allergies Sister      Diabetes Sister      Depression Sister      Hypertension Sister        Objective  /87 (BP Location: Left arm, Patient Position: Chair, Cuff Size: Adult Regular)  Ht 4' 10\" (1.473 m)  Wt 234 lb (106.1 kg)  LMP 09/27/2009  BMI 48.91 kg/m2    GENERAL APPEARANCE: healthy, alert and no distress   GAIT: NORMAL  SKIN: no suspicious lesions or rashes  HEENT: Sclera clear, anicteric  CV: good peripheral pulses  RESP: Breathing not labored  NEURO: Normal strength and tone, mentation intact and speech normal  PSYCH:  mentation appears normal and affect normal/bright    Bilateral Knee exam  Inspection:      Difficult to appreciate swelling or effusion due to body habitus     Patella:      Normal patellar tracking noted through range of motion bilateral     Tender:      medial patellar border bilateral       lateral patellar border bilateral       medial joint line bilateral       lateral joint line bilateral     Non Tender:      remainder of knee area bilateral     Knee ROM:      Range of motion slightly limited in flexion bilaterally     Strength:      5/5 with knee extension bilateral     Special Tests:     positive (+) Faye bilateral       neg (-) anterior drawer bilateral       neg (-) posterior drawer bilateral       neg (-) varus at 0 deg and 30 deg bilateral       neg (-) valgus at 0 deg and 30 deg bilateral     Gait:      " normal     Neurovascular:      2+ peripheral pulses bilaterally and brisk capillary refill       sensation grossly intact    Radiology  I ordered, visualized and reviewed these images with the patient  BOTH KNEES THREE VIEWS EACH SIDE  6/13/2018 9:40 AM   HISTORY:  Chronic pain of both knees  COMPARISON: None.  IMPRESSION:  1. Right knee:  Normal.  2. Left knee: Normal.    Assessment:  1. Primary osteoarthritis of both knees    2. Chronic pain of both knees    3. Patellofemoral pain syndrome of both knees      Chronic knee pain with some degenerative changes, no improvement with PT and Synvisc injection.  Agree with MRI, will await results to determine next steps in treatment including trial of steroid injection or surgical referral.  Discussed other supportive care including rest, ice, elevation, compression in the interim.    Plan:  - Today's Plan of Care:  Continue with Rest, Ice, Compression, and Elevation.  Apply ice 10-15 minutes every 2-3 hours  Call us to review MRI when completed    -We also discussed other future treatment options:  Referral to orthopedic surgeon or Steroid injection of knee    Follow Up: to be determined     Concerning signs and symptoms were reviewed.  The patient expressed understanding of this management plan and all questions were answered at this time.    Rochelle Nicholas MD [facility]  Primary Care Sports Medicine  [facility]    Again, thank you for allowing me to participate in the care of your patient.        Sincerely,        Rochelle Nicholas MD

## 2018-08-22 NOTE — PROGRESS NOTES
Sports Medicine Clinic Visit - Interim History August 22, 2018    PCP: Kd Leong Fly is a 52 year old female who is seen in f/u up for    Primary osteoarthritis of both knees  Chronic pain of both knees  Patellofemoral pain syndrome of both knees.  Since last visit on 6/13/18 patient has received an Synvisc One injection in the left knee from Dr Medel on 7/10/18 as she reports her right knee pain has been improving with physical therapy. She reports the synvisc injection did not help the left knee pain and her knee pain has increase.  No injury in the interim.  Decided on Synvisc as steroid injections haven't helped elsewhere in the past.  Has an MRI scheduled for tomorrow.    Symptoms are better with: nothing  Symptoms are worse with: sitting with knee flexion, standing, stairs and walking  Additional Features:   Positive: swelling, popping, grinding, instability and weakness   Negative: bruising, catching, locking, paresthesias and numbness    Social History:     Review of Systems  Skin: no bruising, no swelling  Musculoskeletal: as above  Neurologic: no numbness, paresthesias  Remainder of review of systems is negative including constitutional, CV, pulmonary, GI, except as noted in HPI or medical history.    Patient's current problem list, past medical and surgical history, and family history were reviewed.    Patient Active Problem List   Diagnosis     Mild persistent asthma     Polyneuropathy in other diseases classified elsewhere (H)     DVT (deep venous thrombosis) (H)     Alcohol abuse, in remission     SECONDARY POLYCYTHEMIA     Chondromalacia of patella     Sensorineural hearing loss, asymmetrical     Developmental reading disorder     Esophageal reflux     Hypothyroidism     Fatty liver     GILES (Obstructive Sleep Apnea)-Moderate (AHI 16)     RLS (restless legs syndrome)     Smoker     Erythrocytosis     Moderate mixed bipolar I disorder (H)     Personality disorder,  depressive     COPD (chronic obstructive pulmonary disease) (H)     Current use of long term anticoagulation     Rosacea     Health Care Home     DDD (degenerative disc disease), cervical     Benign neoplasm of colon (POLYPOSIS)     Stroke (H)     Somatization disorder     Sebaceous cyst of right axilla     HTN, goal below 140/90     Left leg pain     Vitamin D deficiency     Long term current use of anticoagulant therapy     Morbid obesity (H)     PVD (peripheral vascular disease) with claudication (H)     Cyst of left ovary     Morbid obesity with alveolar hypoventilation (H)     GI bleed     Severe episode of recurrent major depressive disorder (H)     Conductive hearing loss of left ear with restricted hearing of right ear     Sensorineural hearing loss (SNHL) of right ear with restricted hearing of left ear     Past Medical History:   Diagnosis Date     Acromioclavicular joint arthritis 1/25/2017     Acute bronchospasm 8/15/2016     Acute gouty arthritis 8/4/2014     Anxiety state, unspecified      Bipolar I disorder, most recent episode (or current) unspecified      Closed fracture of metatarsal bone 6/5/2007     Depressive disorder, not elsewhere classified      DVT, lower extremity (H)      Headache 10/17/2014     Impingement syndrome of shoulder region 1/25/2017     Polycythemia, secondary     [place] HGB      Right shoulder pain 10/14/2014     Trochanteric bursitis of both hips 1/11/2016     Past Surgical History:   Procedure Laterality Date     BILIARY STENT SINGLE USE COV      3 stints in left leg     BONE MARROW BIOPSY, BONE SPECIMEN, NEEDLE/TROCAR N/A 11/17/2014    Procedure: BIOPSY BONE MARROW;  Surgeon: Hay Aaron MD;  Location: [facility]     D & C  10/26/09    with uterine ablation     ESOPHAGOSCOPY, GASTROSCOPY, DUODENOSCOPY (EGD), COMBINED  4/21/2014    Procedure: Gastroscopy;  Surgeon: Moris Thomas MD;  Location: [facility]     HYSTERECTOMY, PAP NO LONGER INDICATED  1-4-2010      "LITHOTRIPSY  2004    Lithotrypsy     Family History   Problem Relation Age of Onset     Hypertension Mother      Diabetes Mother      Blood Disease Mother      HEART DISEASE Mother      chf     Cerebrovascular Disease Mother      Hypertension Father      Cancer Father      lung cancer     Allergies Sister      Diabetes Sister      Depression Sister      Hypertension Sister        Objective  /87 (BP Location: Left arm, Patient Position: Chair, Cuff Size: Adult Regular)  Ht 4' 10\" (1.473 m)  Wt 234 lb (106.1 kg)  LMP 09/27/2009  BMI 48.91 kg/m2    GENERAL APPEARANCE: healthy, alert and no distress   GAIT: NORMAL  SKIN: no suspicious lesions or rashes  HEENT: Sclera clear, anicteric  CV: good peripheral pulses  RESP: Breathing not labored  NEURO: Normal strength and tone, mentation intact and speech normal  PSYCH:  mentation appears normal and affect normal/bright    Bilateral Knee exam  Inspection:      Difficult to appreciate swelling or effusion due to body habitus     Patella:      Normal patellar tracking noted through range of motion bilateral     Tender:      medial patellar border bilateral       lateral patellar border bilateral       medial joint line bilateral       lateral joint line bilateral     Non Tender:      remainder of knee area bilateral     Knee ROM:      Range of motion slightly limited in flexion bilaterally     Strength:      5/5 with knee extension bilateral     Special Tests:     positive (+) Faye bilateral       neg (-) anterior drawer bilateral       neg (-) posterior drawer bilateral       neg (-) varus at 0 deg and 30 deg bilateral       neg (-) valgus at 0 deg and 30 deg bilateral     Gait:      normal     Neurovascular:      2+ peripheral pulses bilaterally and brisk capillary refill       sensation grossly intact    Radiology  I ordered, visualized and reviewed these images with the patient  BOTH KNEES THREE VIEWS EACH SIDE  6/13/2018 9:40 AM   HISTORY:  Chronic pain of " both knees  COMPARISON: None.  IMPRESSION:  1. Right knee:  Normal.  2. Left knee: Normal.    Assessment:  1. Primary osteoarthritis of both knees    2. Chronic pain of both knees    3. Patellofemoral pain syndrome of both knees      Chronic knee pain with some degenerative changes, no improvement with PT and Synvisc injection.  Agree with MRI, will await results to determine next steps in treatment including trial of steroid injection or surgical referral.  Discussed other supportive care including rest, ice, elevation, compression in the interim.    Plan:  - Today's Plan of Care:  Continue with Rest, Ice, Compression, and Elevation.  Apply ice 10-15 minutes every 2-3 hours  Call us to review MRI when completed    -We also discussed other future treatment options:  Referral to orthopedic surgeon or Steroid injection of knee    Follow Up: to be determined     Concerning signs and symptoms were reviewed.  The patient expressed understanding of this management plan and all questions were answered at this time.    Rochelle Nicholas MD CAQ  Primary Care Sports Medicine  [facility]

## 2018-08-22 NOTE — MR AVS SNAPSHOT
After Visit Summary   8/22/2018    Bia Bill    MRN: [id number]           Patient Information     Date Of Birth          1965        Visit Information        Provider Department      8/22/2018 1:00 PM Rochelle Nicholas MD [facility]        Today's Diagnoses     Primary osteoarthritis of both knees    -  1    Chronic pain of both knees        Patellofemoral pain syndrome of both knees          Care Instructions        Plan:  - Today's Plan of Care:  Continue with Rest, Ice, Compression, and Elevation.  Apply ice 10-15 minutes every 2-3 hours  Call us to review MRI when completed    -We also discussed other future treatment options:  Referral to orthopedic surgeon or Steroid injection of knee    Follow Up: to be determined     If you have any further questions for your physician or physician s care team you can call [phone] and use option 3 to leave a voice message. Calls received during business hours will be returned same day.              Follow-ups after your visit        Your next 10 appointments already scheduled     Aug 23, 2018 10:30 AM CDT   MR KNEE LEFT W/O CONTRAST with [facility]   [facility] ([facility])    [address] [address]   [phone]           Take your medicines as usual, unless your doctor tells you not to. Bring a list of your current medicines to your exam (including vitamins, minerals and over-the-counter drugs). Also bring the results of similar scans you may have had.  Please remove any body piercings and hair extensions before you arrive.  Follow your doctor s orders. If you do not, we may have to postpone your exam.  You may or may not receive IV contrast for this exam pending the discretion of the Radiologist.  You do not need to do anything special to prepare.  The MRI machine uses a strong magnet. Please wear clothes without metal (snaps, zippers). A sweatsuit works well, or we  may give you a hospital gown.   **IMPORTANT** THE INSTRUCTIONS BELOW ARE ONLY FOR THOSE PATIENTS WHO HAVE BEEN PRESCRIBED SEDATION OR GENERAL ANESTHESIA DURING THEIR MRI PROCEDURE:  IF YOUR DOCTOR PRESCRIBED ORAL SEDATION (take medicine to help you relax during your exam):   You must get the medicine from your doctor (oral medication) before you arrive. Bring the medicine to the exam. Do not take it at home. You ll be told when to take it upon arriving for your exam.   Arrive one hour early. Bring someone who can take you home after the test. Your medicine will make you sleepy. After the exam, you may not drive, take a bus or take a taxi by yourself.  IF YOUR DOCTOR PRESCRIBED IV SEDATION:   Arrive one hour early. Bring someone who can take you home after the test. Your medicine will make you sleepy. After the exam, you may not drive, take a bus or take a taxi by yourself.   No eating 6 hours before your exam. You may have clear liquids up until 4 hours before your exam. (Clear liquids include water, clear tea, black coffee and fruit juice without pulp.)  IF YOUR DOCTOR PRESCRIBED ANESTHESIA (be asleep for your exam):   Arrive 1 1/2 hours early. Bring someone who can take you home after the test. You may not drive, take a bus or take a taxi by yourself.   No eating 8 hours before your exam. You may have clear liquids up until 4 hours before your exam. (Clear liquids include water, clear tea, black coffee and fruit juice without pulp.)   You will spend four to five hours in the recovery room.  Please call the Imaging Department at your exam site with any questions.            Sep 04, 2018  9:30 AM CDT   Return Visit with Kd Morris   [facility] ([facility])    [address] [address]   [phone]            Sep 21, 2018 12:00 PM CDT   Return Visit with Kd Morris   [facility] ([facility])    [address]  "[address]   [phone]            Sep 21, 2018  1:00 PM CDT   LAB with [facility] ([facility])    [address]  [facility] [address]   [phone]           Please do not eat 10-12 hours before your appointment if you are coming in fasting for labs on lipids, cholesterol, or glucose (sugar). This does not apply to pregnant women. Water, hot tea and black coffee (with nothing added) are okay. Do not drink other fluids, diet soda or chew gum.            Sep 28, 2018  1:30 PM CDT   Return Visit with Joshua Watts MD   [facility] ([facility])    [facility]  [address]  [facility] [address]   [phone]              Who to contact     If you have questions or need follow up information about today's clinic visit or your schedule please contact [facility] directly at [phone].  Normal or non-critical lab and imaging results will be communicated to you by MyChart, letter or phone within 4 business days after the clinic has received the results. If you do not hear from us within 7 days, please contact the clinic through MyChart or phone. If you have a critical or abnormal lab result, we will notify you by phone as soon as possible.  Submit refill requests through [employer] or call your pharmacy and they will forward the refill request to us. Please allow 3 business days for your refill to be completed.          Additional Information About Your Visit        Care EveryWhere ID     This is your Care EveryWhere ID. This could be used by other organizations to access your [facility] medical records  [id number]        Your Vitals Were     Height Last Period BMI (Body Mass Index)             4' 10\" (1.473 m) 09/27/2009 48.91 kg/m2          Blood Pressure from Last 3 Encounters:   08/22/18 138/87   08/15/18 108/62   08/02/18 132/74    Weight from Last 3 Encounters:   08/22/18 " 234 lb (106.1 kg)   08/15/18 231 lb 9.6 oz (105.1 kg)   07/10/18 230 lb (104.3 kg)              Today, you had the following     No orders found for display       Primary Care Provider Office Phone # Fax #    Kd Leong [phone] [address] [facility] [address]        Equal Access to Services     [facility] SUREKHA : Hadii aad ku hadasho Soomaali, waaxda luqadaha, qaybta kaalmada adeegyada, waxay idiin hayaan adeeg bimalsh la'aan . So [facility] [phone].    ATENCIÓN: Si habla espraul, tiene a terry disposición servicios gratuitos de asistencia lingüística. [facility] [phone].    We comply with applicable federal civil rights laws and [place] laws. We do not discriminate on the basis of race, color, national origin, age, disability, sex, sexual orientation, or gender identity.            Thank you!     Thank you for choosing [facility]  for your care. Our goal is always to provide you with excellent care. Hearing back from our patients is one way we can continue to improve our services. Please take a few minutes to complete the written survey that you may receive in the mail after your visit with us. Thank you!             Your Updated Medication List - Protect others around you: Learn how to safely use, store and throw away your medicines at www.disposemymeds.org.          This list is accurate as of 8/22/18  1:25 PM.  Always use your most recent med list.                   Brand Name Dispense Instructions for use Diagnosis    acetaminophen 500 MG tablet    TYLENOL     Take 500-1,000 mg by mouth every 6 hours as needed for mild pain        * albuterol 108 (90 Base) MCG/ACT inhaler    PROAIR HFA/PROVENTIL HFA/VENTOLIN HFA    3 Inhaler    Inhale 2 puffs into the lungs every 6 hours as needed for shortness of breath / dyspnea or wheezing    Mild persistent asthma without complication       * albuterol (2.5 MG/3ML) 0.083% neb solution     1 Box    Take 1  vial (2.5 mg) by nebulization every 6 hours as needed for shortness of breath / dyspnea or wheezing    Mild persistent asthma without complication       allopurinol 100 MG tablet    ZYLOPRIM    30 tablet    TAKE ONE TABLET BY MOUTH ONE TIME DAILY    Acute gouty arthritis       ARIPiprazole 5 MG tablet    ABILIFY    45 tablet    TAKE 1.5 TABLETS (7.5 MG) BY MOUTH AT BEDTIME    Major depressive disorder, recurrent episode, moderate (H)       buPROPion 150 MG 24 hr tablet    WELLBUTRIN XL    30 tablet    TAKE  ONE TABLET BY MOUTH EVERY DAY IN THE MORNING    COPD exacerbation (H)       cholecalciferol 1000 UNIT tablet    vitamin D3     Take 1,000 Units by mouth daily        EPINEPHrine 0.3 MG/0.3ML injection 2-pack    EPIPEN/ADRENACLICK/or ANY BX GENERIC EQUIV    0.6 mL    Inject 0.3 mLs (0.3 mg) into the muscle once as needed for anaphylaxis    Anaphylactic reaction to bee sting, undetermined intent, subsequent encounter       furosemide 20 MG tablet    LASIX    180 tablet    TAKE TWO TABLETS BY MOUTH TWICE DAILY    Lymphedema of both lower extremities       * gabapentin 300 MG capsule    NEURONTIN    90 capsule    ONE CAPSULE BY MOUTH AT BEDTIME WITH 600 MG CAPSULE- FOR TOTAL  MG AT BEDTIME    Polyneuropathy in other diseases classified elsewhere (H)       * gabapentin 600 MG tablet    NEURONTIN    90 tablet    TAKE ONE TABLET BY MOUTH THREE TIMES DAILY    Polyneuropathy in other diseases classified elsewhere (H)       ipratropium - albuterol 0.5 mg/2.5 mg/3 mL 0.5-2.5 (3) MG/3ML neb solution    DUONEB    30 vial    Take 1 vial (3 mLs) by nebulization every 6 hours as needed for shortness of breath / dyspnea or wheezing    Chronic obstructive pulmonary disease, unspecified COPD type (H)       lamoTRIgine 200 MG tablet    LaMICtal    30 tablet    TAKE 1 TABLET (200 MG) BY MOUTH DAILY    Major depressive disorder, recurrent episode, moderate (H)       levothyroxine 150 MCG tablet    SYNTHROID/LEVOTHROID    90  "tablet    Take 1 tablet (150 mcg) by mouth daily    Hypothyroidism, unspecified type       lidocaine 5 % ointment    XYLOCAINE    240 g    Apply topically 3 times daily as needed for moderate pain    Acute pain of left knee       LORazepam 1 MG tablet    ATIVAN    2 tablet    Take 1 tablet (1 mg) by mouth as needed for anxiety Take 30 minutes prior to procedure.  Do not operate a vehicle after taking this medication    Acute pain of left knee       mometasone-formoterol 200-5 MCG/ACT oral inhaler    DULERA    13 g    Inhale 2 puffs into the lungs 2 times daily    COPD exacerbation (H), Chronic obstructive pulmonary disease, unspecified COPD type (H)       omeprazole 20 MG CR capsule    priLOSEC    180 capsule    TAKE ONE CAPSULE BY MOUTH TWICE DAILY    Gastroesophageal reflux disease without esophagitis       * order for DME      Equipment being ordered: CPAP AIRSENSE 10 5-18 CM H20 # [id number]   DN# [id number]        * order for DME     1 Month    Equipment being ordered: INCONTINENCE PADS  QID    Other urinary incontinence       * order for DME     60 Month    Equipment being ordered: DEPENDS SIZE LARGE    Mixed incontinence       * order for DME     2 Units    Equipment being ordered: [id number]\" x2pair   \"20-30mmHg [place] Hybrid Liners\" x 2 pair    Peripheral edema       order for DME     1 Device    Equipment being ordered: Nebulizer    COPD exacerbation (H), Chronic obstructive pulmonary disease, unspecified COPD type (H)       order for DME     1 each    Equipment being ordered:x2 [employer] 30/40mmHg compression wraps with x2 extra prs of compression liners    Secondary lymphedema       potassium chloride SA 10 MEQ CR tablet    K-DUR/KLOR-CON M    30 tablet    TAKE ONE TABLET BY MOUTH ONE TIME DAILY    Lymphedema       pramipexole 0.125 MG tablet    MIRAPEX    60 tablet    TAKE 1-2 TABLETS (0.125-0.25 MG) BY MOUTH AT BEDTIME    Restless leg syndrome       simvastatin 20 MG tablet    ZOCOR    90 tablet    TAKE " ONE TABLET BY MOUTH AT BEDTIME    Hypercholesteremia       traZODone 150 MG tablet    DESYREL    30 tablet    TAKE ONE TABLET BY MOUTH NIGHTLY AT BEDTIME    Major depressive disorder, recurrent episode, moderate (H)       warfarin 5 MG tablet    COUMADIN    180 tablet    Take 10 mg on Mon, Wed, Fri; 12.5 mg all other days or as directed by the Anticoagulation Clinic    DVT (deep venous thrombosis) (H)       * Notice:  This list has 8 medication(s) that are the same as other medications prescribed for you. Read the directions carefully, and ask your doctor or other care provider to review them with you.

## 2018-08-23 ENCOUNTER — HOSPITAL ENCOUNTER (OUTPATIENT)
Dept: MRI IMAGING | Facility: CLINIC | Age: 53
Discharge: HOME | End: 2018-08-23
Attending: NURSE PRACTITIONER | Admitting: NURSE PRACTITIONER
Payer: COMMERCIAL

## 2018-08-23 DIAGNOSIS — M25.562 ACUTE PAIN OF LEFT KNEE: ICD-10-CM

## 2018-08-23 PROCEDURE — 73721 MRI JNT OF LWR EXTRE W/O DYE: CPT | Mod: LT

## 2018-08-24 ASSESSMENT — ANXIETY QUESTIONNAIRES
7. FEELING AFRAID AS IF SOMETHING AWFUL MIGHT HAPPEN: NOT AT ALL
4. TROUBLE RELAXING: MORE THAN HALF THE DAYS
3. WORRYING TOO MUCH ABOUT DIFFERENT THINGS: SEVERAL DAYS
5. BEING SO RESTLESS THAT IT IS HARD TO SIT STILL: MORE THAN HALF THE DAYS
2. NOT BEING ABLE TO STOP OR CONTROL WORRYING: SEVERAL DAYS
1. FEELING NERVOUS, ANXIOUS, OR ON EDGE: SEVERAL DAYS
6. BECOMING EASILY ANNOYED OR IRRITABLE: MORE THAN HALF THE DAYS
GAD7 TOTAL SCORE: 9

## 2018-08-24 NOTE — PROGRESS NOTES
Progress Note  Disclaimer: This note consists of symbols derived from keyboarding, dictation and/or voice recognition software. As a result, there may be errors in the script that have gone undetected. Please consider this when interpreting information found in this chart.    Client Name: Bia Bill  Date: 8/21/2018         Service Type: Individual      Session Start Time: 9:30 AM  Session End Time: 10:15 AM      Session Length: 45     Session #: 34     Attendees: Client attended alone    Treatment Plan Last Reviewed: 4/3/2018  PHQ-9 / CHIN-7 : See flowsheet     DATA   Client reports she is Feeling a little better.  She has had a pain injection in her knee. she has been having some trouble getting her prescriptions filled on a regular basis by per primary is sorting this out.  She has had some contact Via Skype with her grandson.  Reports fewer tremors.  She is working on using breathing techniques and positive affirmations to reduce her anxiety.  Also notes her edema has improved with exercise.  Attempting to maintain boundaries with daughter and sister regarding their alcohol use.   Progress Since Last Session (Related to Symptoms / Goals / Homework):   Symptoms: Stable    Homework: Achieved / completed to satisfaction      Episode of Care Goals: Satisfactory progress - ACTION (Actively working towards change); Intervened by reinforcing change plan / affirming steps taken     Current / Ongoing Stressors and Concerns:   Recurrent depression  family relational problems, medical problems, chronic pain, trauma history      Treatment Objective(s) Addressed in This Session:   Increase interest, engagement, and pleasure in doing things  Decrease frequency and intensity of feeling down, depressed, hopeless       Intervention:   CBT: Behavioral activation supported client's effort at establishing maintaining healthy boundaries with her family.  ASSESSMENT: Current  Emotional / Mental Status (status of significant symptoms):   Risk status (Self / Other harm or suicidal ideation)   Client denies current fears or concerns for personal safety.   Client denies current or recent suicidal ideation or behaviors.   Client denies current or recent homicidal ideation or behaviors.   Client denies current or recent self injurious behavior or ideation.   Client denies other safety concerns.   A safety and risk management plan has not been developed at this time, however client was given the after-hours number / 911 should there be a change in any of these risk factors.     Appearance:   Appropriate    Eye Contact:   Good    Psychomotor Behavior: Normal    Attitude:   Cooperative    Orientation:   All   Speech    Rate / Production: Normal     Volume:  Normal    Mood:    Normal   Affect:    Appropriate    Thought Content:  Clear    Thought Form:  Coherent  Logical    Insight:    Good      Medication Review:   No changes to current psychiatric medication(s)     Medication Compliance:   Yes     Changes in Health Issues:   None reported     Chemical Use Review:   Substance Use: Chemical use reviewed, no active concerns identified      Tobacco Use: Client reports no smoking for the last week     Collateral Reports Completed:   Not Applicable    PLAN: (Client Tasks / Therapist Tasks / Other)  Client to continue With her self-care routine and maintenance of boundaries.   Maintain medication compliance and contact with psychiatry.  Take care of herself physically.  Practice one to 2 grounding techniques each day.   Client to maintain sobriety and contact with supportive sober others.  Kd Morris                                                         ________________________________________________________________________    Treatment Plan    Client's Name: Bia Bill  YOB: 1965    Date: 10/24/2017      DSM5 Diagnoses: (Sustained by DSM5 Criteria Listed  Above)  Diagnoses: 296.40 Bipolar I Disorder Current or Most Recent Episode Manic, Unspecified  Psychosocial & Contextual Factors: financial difficulties, chronic pain, roommate issues  WHODAS 2.0 (12 item)               This questionnaire asks about difficulties due to health conditions. Health conditions             include disease or illnesses, other health problems that may be short or long lasting,             injuries, mental health or emotional problems, and problems with alcohol or drugs.                         Think back over the past 30 days and answer these questions, thinking about how much             difficulty you had doing the following activities. For each question, please [place] only             one response.      S1  Standing for long periods such as 30 minutes?  Mild =           2    S2  Taking care of household responsibilities?  Moderate =   3    S3  Learning a new task, for example, learning how to get to a new place?  Mild =           2    S4  How much of a problem do you have joining community activities (for example, festivals, Holiness or other activities) in the same way as anyone else can?  Moderate =   3    S5  How much have you been emotionally affected by your health problems?  Mild =           2        In the past 30 days, how much difficulty did you have in:    S6  Concentrating on doing something for ten minutes?  Mild =           2    S7  Walking a long distance such as a kilometer (or equivalent)?  Severe =       4    S8  Washing your whole body?  None =         1    S9  Getting dressed?  None =         1    S10  Dealing with people you do not know?  Moderate =   3    S11  Maintaining a friendship?  Severe =       4    S12  Your day to day work?  Moderate =   3       H1  Overall, in the past 30 days, how many days were these difficulties present?  Record number of days seven    H2  In the past 30 days, for how many days were you totally unable to carry out your usual activities  or work because of any health condition?  Record number of days  seven    H3  In the past 30 days, not counting the days that you were totally unable, for how many days did you cut back or reduce your usual activities or work because of any health condition?  Record number of days five                   Referral / Collaboration:  Referral to another professional/service is not indicated at this time..    Anticipated number of session or this episode of care: 15    Goals  1. Education- the Biopsychosocial model of depression  a. Client will be able to describe how depression is effecting them physically, emotionally and socially  2. Behavioral Activation  a. Client will learn to assess their depression on a day to day basis  b. Client will Identify two forms of exercise/activity and engage in them 3 times per week  c. Client will Identify 3 things that make them laugh, and engage in these 5 times per week.  d. Client will Identify 1-2Creative activities or hobbies  and engage in them 2 times per week  e. Client will identify music, movies, books that make them feel good and use them 3-4 times per week  3. Self-care  a. Client will identify 5 things they can do just for themselves  b. Client will take time for quiet, reflection, meditation 5 times per week  c. Client will Learn to set boundaries when appropriate  d. Client will Identify 2 individuals they can call on for support, distraction  4. Assessment of progress  a. Client will engage in assessment of their progress on a regular basis    Bipolar Disorder  Treatment plan:  1. Education- the Biopsychosocial model of Bipolar Disorder    a. Client will be able to describe in general terms what Bipolar Disorder  is and is not  b. Client will be able to describe how Bipolar Disorder  has affected their life in at least two different areas, such as school or work and Home/relationships  c. Clients parents/guardians or significant others will be provided information on  what Bipolar Disorder  is and the ways it can affect relationships and be encouraged to be a part of clients treatment team.  2. Medication  a. Client will participate in medication evaluation for Bipolar Disorder  symptoms and follow medication recommendations.   3. Identification and management of triggers  a. Client and therapist will examine patient s history to determine if there are predictable triggers for manic or depressive episodes (e.g. boredom, anger, family stress)  b. Client and therapist will develop means of diffusing these triggers (e.g. relaxation strategies, boundary setting, anger management)  4. Comorbid conditions   a. Client and therapist will asses for comorbid conditions (e.g. anxiety, depression, substance use). and add additional items to treatment plan as needed  5. Self-care  a. Client will identify 3 things they can do just for themselves  b. Client will take time for quiet, reflection, meditation 3 times per week  c. Client will Learn to set boundaries when appropriate  d. Client will Identify 2 individuals they can call on for support, distraction  5. Behavioral Activation  a. Client will Identify two forms of exercise/activity and engage in them 3 times per week  b. Client will Identify 2 things that make them laugh, and engage in these 3 times per week.  c. Client will Identify 2 Creative activities or hobbies  and engage in them 2 times per week  d. Client will identify music, movies, books that make them feel good and use them 3 times per week  6. Assessment of progress  a. Client will engage in assessment of their progress on a regular basis    Client has reviewed and agreed to the above plan.      Kd Morris  4/3/2018

## 2018-08-25 ENCOUNTER — HOSPITAL ENCOUNTER (EMERGENCY)
Facility: CLINIC | Age: 53
Discharge: HOME | End: 2018-08-26
Attending: EMERGENCY MEDICINE | Admitting: EMERGENCY MEDICINE
Payer: COMMERCIAL

## 2018-08-25 ENCOUNTER — APPOINTMENT (OUTPATIENT)
Dept: CT IMAGING | Facility: CLINIC | Age: 53
End: 2018-08-25
Attending: EMERGENCY MEDICINE
Payer: COMMERCIAL

## 2018-08-25 DIAGNOSIS — E86.0 DEHYDRATION: ICD-10-CM

## 2018-08-25 DIAGNOSIS — D75.1 POLYCYTHEMIA: ICD-10-CM

## 2018-08-25 DIAGNOSIS — R30.0 DYSURIA: ICD-10-CM

## 2018-08-25 DIAGNOSIS — R22.43 LOCALIZED SWELLING OF BOTH LOWER LEGS: ICD-10-CM

## 2018-08-25 LAB
ALBUMIN UR-MCNC: 100 MG/DL
ANION GAP SERPL CALCULATED.3IONS-SCNC: 8 MMOL/L (ref 3–14)
APPEARANCE UR: ABNORMAL
BACTERIA #/AREA URNS HPF: ABNORMAL /HPF
BASOPHILS # BLD AUTO: 0 10E9/L (ref 0–0.2)
BASOPHILS NFR BLD AUTO: 0.3 %
BILIRUB UR QL STRIP: NEGATIVE
BUN SERPL-MCNC: 13 MG/DL (ref 7–30)
CALCIUM SERPL-MCNC: 8.7 MG/DL (ref 8.5–10.1)
CHLORIDE SERPL-SCNC: 103 MMOL/L (ref 94–109)
CO2 SERPL-SCNC: 29 MMOL/L (ref 20–32)
COLOR UR AUTO: YELLOW
CREAT SERPL-MCNC: 0.77 MG/DL (ref 0.52–1.04)
DIFFERENTIAL METHOD BLD: ABNORMAL
EOSINOPHIL # BLD AUTO: 0.1 10E9/L (ref 0–0.7)
EOSINOPHIL NFR BLD AUTO: 0.9 %
ERYTHROCYTE [DISTWIDTH] IN BLOOD BY AUTOMATED COUNT: 20 % (ref 10–15)
GFR SERPL CREATININE-BSD FRML MDRD: 78 ML/MIN/1.7M2
GLUCOSE SERPL-MCNC: 114 MG/DL (ref 70–99)
GLUCOSE UR STRIP-MCNC: NEGATIVE MG/DL
HCT VFR BLD AUTO: 59.5 % (ref 35–47)
HGB BLD-MCNC: 18.7 G/DL (ref 11.7–15.7)
HGB UR QL STRIP: NEGATIVE
IMM GRANULOCYTES # BLD: 0 10E9/L (ref 0–0.4)
IMM GRANULOCYTES NFR BLD: 0.3 %
KETONES UR STRIP-MCNC: NEGATIVE MG/DL
LEUKOCYTE ESTERASE UR QL STRIP: ABNORMAL
LYMPHOCYTES # BLD AUTO: 1.7 10E9/L (ref 0.8–5.3)
LYMPHOCYTES NFR BLD AUTO: 25.9 %
MCH RBC QN AUTO: 27.9 PG (ref 26.5–33)
MCHC RBC AUTO-ENTMCNC: 31.4 G/DL (ref 31.5–36.5)
MCV RBC AUTO: 89 FL (ref 78–100)
MONOCYTES # BLD AUTO: 0.4 10E9/L (ref 0–1.3)
MONOCYTES NFR BLD AUTO: 6.9 %
MUCOUS THREADS #/AREA URNS LPF: PRESENT /LPF
NEUTROPHILS # BLD AUTO: 4.2 10E9/L (ref 1.6–8.3)
NEUTROPHILS NFR BLD AUTO: 65.7 %
NITRATE UR QL: NEGATIVE
NRBC # BLD AUTO: 0 10*3/UL
NRBC BLD AUTO-RTO: 0 /100
PH UR STRIP: 5 PH (ref 5–7)
PLATELET # BLD AUTO: 125 10E9/L (ref 150–450)
POTASSIUM SERPL-SCNC: 3.5 MMOL/L (ref 3.4–5.3)
RBC # BLD AUTO: 6.71 10E12/L (ref 3.8–5.2)
RBC #/AREA URNS AUTO: 1 /HPF (ref 0–2)
SODIUM SERPL-SCNC: 140 MMOL/L (ref 133–144)
SOURCE: ABNORMAL
SP GR UR STRIP: 1.03 (ref 1–1.03)
SQUAMOUS #/AREA URNS AUTO: 16 /HPF (ref 0–1)
UROBILINOGEN UR STRIP-MCNC: 2 MG/DL (ref 0–2)
WBC # BLD AUTO: 6.4 10E9/L (ref 4–11)
WBC #/AREA URNS AUTO: 11 /HPF (ref 0–5)

## 2018-08-25 PROCEDURE — 99284 EMERGENCY DEPT VISIT MOD MDM: CPT | Mod: 25

## 2018-08-25 PROCEDURE — 80048 BASIC METABOLIC PNL TOTAL CA: CPT | Performed by: EMERGENCY MEDICINE

## 2018-08-25 PROCEDURE — 87186 SC STD MICRODIL/AGAR DIL: CPT | Performed by: EMERGENCY MEDICINE

## 2018-08-25 PROCEDURE — 87088 URINE BACTERIA CULTURE: CPT | Performed by: EMERGENCY MEDICINE

## 2018-08-25 PROCEDURE — 25000132 ZZH RX MED GY IP 250 OP 250 PS 637: Performed by: EMERGENCY MEDICINE

## 2018-08-25 PROCEDURE — 99284 EMERGENCY DEPT VISIT MOD MDM: CPT | Mod: Z6 | Performed by: EMERGENCY MEDICINE

## 2018-08-25 PROCEDURE — 74176 CT ABD & PELVIS W/O CONTRAST: CPT

## 2018-08-25 PROCEDURE — 85025 COMPLETE CBC W/AUTO DIFF WBC: CPT | Performed by: EMERGENCY MEDICINE

## 2018-08-25 PROCEDURE — 87086 URINE CULTURE/COLONY COUNT: CPT | Performed by: EMERGENCY MEDICINE

## 2018-08-25 PROCEDURE — 96360 HYDRATION IV INFUSION INIT: CPT

## 2018-08-25 PROCEDURE — 81001 URINALYSIS AUTO W/SCOPE: CPT | Performed by: EMERGENCY MEDICINE

## 2018-08-25 RX ORDER — PHENAZOPYRIDINE HYDROCHLORIDE 100 MG/1
200 TABLET, FILM COATED ORAL ONCE
Status: COMPLETED | OUTPATIENT
Start: 2018-08-25 | End: 2018-08-25

## 2018-08-25 RX ADMIN — PHENAZOPYRIDINE HYDROCHLORIDE 200 MG: 100 TABLET, FILM COATED ORAL at 22:52

## 2018-08-25 ASSESSMENT — ENCOUNTER SYMPTOMS
FEVER: 0
FATIGUE: 0
DYSURIA: 1
BACK PAIN: 0
SHORTNESS OF BREATH: 0
FREQUENCY: 1
CHILLS: 0
VOMITING: 0
HEADACHES: 0
NAUSEA: 0
FLANK PAIN: 1
LIGHT-HEADEDNESS: 0
CHEST TIGHTNESS: 0
APPETITE CHANGE: 0
ABDOMINAL PAIN: 0

## 2018-08-25 ASSESSMENT — ANXIETY QUESTIONNAIRES: GAD7 TOTAL SCORE: 9

## 2018-08-25 ASSESSMENT — PATIENT HEALTH QUESTIONNAIRE - PHQ9: SUM OF ALL RESPONSES TO PHQ QUESTIONS 1-9: 8

## 2018-08-25 NOTE — ED AVS SNAPSHOT
" [facility] Emergency Department    [address] [place] [address]    Phone:  [phone]    Fax:  [phone]                                       Bia Bill   MRN: [id number]    Department:  [facility] Emergency Department   Date of Visit:  8/25/2018           Patient Information     Date Of Birth          1965        Your diagnoses for this visit were:     Dysuria with likely UTI    Polycythemia     Dehydration     Localized swelling of both lower legs        You were seen by Michael Elliott MD.      Follow-up Information     Follow up with Kd Leong MD. Schedule an appointment as soon as possible for a visit in 2 days.    Specialty:  Family Practice    Why:  For follow up    Contact information:    [address] [place] [address]  [phone]          Discharge Instructions         Dysuria     Painful urination (dysuria) is often caused by a problem in the urinary tract.   Dysuria is pain felt during urination. It is often described as a burning. Learn more about this problem and how it can be treated.  What causes dysuria?  Possible causes include:    Infection with a bacteria or virus such as a urinary tract infection (UTI or a sexually transmitted infection (STI)    Sensitivity or allergy to chemicals such as those found in lotions and other products    Prostate or bladder problems    Radiation therapy to the pelvic area  How is dysuria diagnosed?  Your healthcare provider will examine you. He or she will ask about your symptoms and health. After talking with you and doing a physical exam, your healthcare provider may know what is causing your dysuria. He or she will usually request  a sample of your urine. Tests of your urine, or a \"urinalysis,\" are done. A urinalysis may include:    Looking at the urine sample (visual exam)    Checking for substances (chemical exam)    Looking at a small amount under a microscope (microscopic exam)  Some parts of " the urinalysis may be done in the provider's office and some in a lab. And, the urine sample may be checked for bacteria and yeast (urine culture). Your healthcare provider will tell you more about these tests if they are needed.  How is dysuria treated?  Treatment depends on the cause. If you have a bacterial infection, you may need antibiotics. You may be given medicines to make it easier for you to urinate and help relieve pain. Your healthcare provider can tell you more about your treatment options. Untreated, symptoms may get worse.  When to call your healthcare provider  Call the healthcare provider right away if you have any of the following:    Fever of 100.4 F (38 C) or higher     No improvement after three days of treatment    Trouble urinating because of pain    New or increased discharge from the vagina or penis    Rash or joint pain    Increased back or abdominal pain    Enlarged painful lymph nodes (lumps) in the groin   Date Last Reviewed: 1/1/2017 2000-2017 [employer]. [address] [place] [address]. All rights reserved. This information is not intended as a substitute for professional medical care. Always follow your healthcare professional's instructions.          Your next 10 appointments already scheduled     Sep 04, 2018  9:30 AM CDT   Return Visit with Kd CAMMY [facility] ([facility])    [address] [address]   [phone]            Sep 21, 2018 12:00 PM CDT   Return Visit with Kd CHAWLA [facility] ([facility])    [address] [address]   [phone]            Sep 21, 2018  1:00 PM CDT   LAB with [facility] ([facility])    [address] [address]   [phone]           Please do not eat 10-12 hours before your appointment if you are coming in fasting for labs on lipids, cholesterol, or  glucose (sugar). This does not apply to pregnant women. Water, hot tea and black coffee (with nothing added) are okay. Do not drink other fluids, diet soda or chew gum.            Sep 28, 2018  1:30 PM CDT   Return Visit with Joshua Watts MD   [facility] ([facility])    [facility]  [address]  [facility] [address] [phone]              24 Hour Appointment Hotline       To make an appointment at any [facility], call [phone] ([phone]). If you don't have a family doctor or clinic, we will help you find one. [facility] clinics are conveniently located to serve the needs of you and your family.             Review of your medicines      START taking        Dose / Directions Last dose taken    cephALEXin 500 MG capsule   Commonly known as:  KEFLEX   Dose:  500 mg   Quantity:  28 capsule        Take 1 capsule (500 mg) by mouth 4 times daily for 7 days   Refills:  0        phenazopyridine 200 MG tablet   Commonly known as:  PYRIDIUM   Dose:  200 mg   Quantity:  8 tablet        Take 1 tablet (200 mg) by mouth 3 times daily for 8 doses   Refills:  0          Our records show that you are taking the medicines listed below. If these are incorrect, please call your family doctor or clinic.        Dose / Directions Last dose taken    acetaminophen 500 MG tablet   Commonly known as:  TYLENOL   Dose:  500-1000 mg        Take 500-1,000 mg by mouth every 6 hours as needed for mild pain   Refills:  0        * albuterol 108 (90 Base) MCG/ACT inhaler   Commonly known as:  PROAIR HFA/PROVENTIL HFA/VENTOLIN HFA   Dose:  2 puff   Quantity:  3 Inhaler        Inhale 2 puffs into the lungs every 6 hours as needed for shortness of breath / dyspnea or wheezing   Refills:  1        * albuterol (2.5 MG/3ML) 0.083% neb solution   Dose:  1 vial   Quantity:  1 Box        Take 1 vial (2.5 mg) by nebulization every 6 hours as needed for shortness of breath /  dyspnea or wheezing   Refills:  0        allopurinol 100 MG tablet   Commonly known as:  ZYLOPRIM   Quantity:  30 tablet        TAKE ONE TABLET BY MOUTH ONE TIME DAILY   Refills:  1        ARIPiprazole 5 MG tablet   Commonly known as:  ABILIFY   Quantity:  45 tablet        TAKE 1.5 TABLETS (7.5 MG) BY MOUTH AT BEDTIME   Refills:  2        buPROPion 150 MG 24 hr tablet   Commonly known as:  WELLBUTRIN XL   Quantity:  30 tablet        TAKE  ONE TABLET BY MOUTH EVERY DAY IN THE MORNING   Refills:  0        cholecalciferol 1000 UNIT tablet   Commonly known as:  vitamin D3   Dose:  1000 Units        Take 1,000 Units by mouth daily   Refills:  0        EPINEPHrine 0.3 MG/0.3ML injection 2-pack   Commonly known as:  EPIPEN/ADRENACLICK/or ANY BX GENERIC EQUIV   Dose:  0.3 mg   Quantity:  0.6 mL        Inject 0.3 mLs (0.3 mg) into the muscle once as needed for anaphylaxis   Refills:  0        furosemide 20 MG tablet   Commonly known as:  LASIX   Quantity:  180 tablet        TAKE TWO TABLETS BY MOUTH TWICE DAILY   Refills:  2        * gabapentin 300 MG capsule   Commonly known as:  NEURONTIN   Quantity:  90 capsule        ONE CAPSULE BY MOUTH AT BEDTIME WITH 600 MG CAPSULE- FOR TOTAL  MG AT BEDTIME   Refills:  2        * gabapentin 600 MG tablet   Commonly known as:  NEURONTIN   Quantity:  90 tablet        TAKE ONE TABLET BY MOUTH THREE TIMES DAILY   Refills:  0        ipratropium - albuterol 0.5 mg/2.5 mg/3 mL 0.5-2.5 (3) MG/3ML neb solution   Commonly known as:  DUONEB   Dose:  1 vial   Quantity:  30 vial        Take 1 vial (3 mLs) by nebulization every 6 hours as needed for shortness of breath / dyspnea or wheezing   Refills:  1        lamoTRIgine 200 MG tablet   Commonly known as:  LaMICtal   Quantity:  30 tablet        TAKE 1 TABLET (200 MG) BY MOUTH DAILY   Refills:  2        levothyroxine 150 MCG tablet   Commonly known as:  SYNTHROID/LEVOTHROID   Dose:  150 mcg   Quantity:  90 tablet        Take 1 tablet (150  "mcg) by mouth daily   Refills:  2        lidocaine 5 % ointment   Commonly known as:  XYLOCAINE   Quantity:  240 g        Apply topically 3 times daily as needed for moderate pain   Refills:  1        LORazepam 1 MG tablet   Commonly known as:  ATIVAN   Dose:  1 mg   Quantity:  2 tablet        Take 1 tablet (1 mg) by mouth as needed for anxiety Take 30 minutes prior to procedure.  Do not operate a vehicle after taking this medication   Refills:  0        mometasone-formoterol 200-5 MCG/ACT oral inhaler   Commonly known as:  DULERA   Dose:  2 puff   Quantity:  13 g        Inhale 2 puffs into the lungs 2 times daily   Refills:  1        omeprazole 20 MG CR capsule   Commonly known as:  priLOSEC   Quantity:  180 capsule        TAKE ONE CAPSULE BY MOUTH TWICE DAILY   Refills:  3        * order for DME        Equipment being ordered: CPAP AIRSENSE 10 5-18 CM H20 # [id number]   DN# [id number]   Refills:  0        * order for DME   Quantity:  1 Month        Equipment being ordered: INCONTINENCE PADS  QID   Refills:  11        * order for DME   Quantity:  60 Month        Equipment being ordered: DEPENDS SIZE LARGE   Refills:  5        * order for DME   Quantity:  2 Units        Equipment being ordered: [id number]\" x2pair   \"20-30mmHg [employer] Hybrid Liners\" x 2 pair   Refills:  11        order for DME   Quantity:  1 Device        Equipment being ordered: Nebulizer   Refills:  0        order for DME   Quantity:  1 each        Equipment being ordered:x2 [employer] 30/40mmHg compression wraps with x2 extra prs of compression liners   Refills:  0        potassium chloride SA 10 MEQ CR tablet   Commonly known as:  K-DUR/KLOR-CON M   Quantity:  30 tablet        TAKE ONE TABLET BY MOUTH ONE TIME DAILY   Refills:  0        pramipexole 0.125 MG tablet   Commonly known as:  MIRAPEX   Quantity:  60 tablet        TAKE 1-2 TABLETS (0.125-0.25 MG) BY MOUTH AT BEDTIME   Refills:  0        simvastatin 20 MG tablet   Commonly known as:  ZOCOR "   Quantity:  90 tablet        TAKE ONE TABLET BY MOUTH AT BEDTIME   Refills:  1        traZODone 150 MG tablet   Commonly known as:  DESYREL   Quantity:  30 tablet        TAKE ONE TABLET BY MOUTH NIGHTLY AT BEDTIME   Refills:  0        warfarin 5 MG tablet   Commonly known as:  COUMADIN   Quantity:  180 tablet        Take 10 mg on Mon, Wed, Fri; 12.5 mg all other days or as directed by the Anticoagulation Clinic   Refills:  2        * Notice:  This list has 8 medication(s) that are the same as other medications prescribed for you. Read the directions carefully, and ask your doctor or other care provider to review them with you.            Prescriptions were sent or printed at these locations (2 Prescriptions)                   [id number] [address]   [address] [address]    Telephone:  [phone]   Fax:  [phone]   Hours:  Closed 10-16-08 business to [facility]                E-Prescribed (2 of 2)         cephALEXin (KEFLEX) 500 MG capsule               phenazopyridine (PYRIDIUM) 200 MG tablet                Procedures and tests performed during your visit     Abd/pelvis CT - no contrast - Stone Protocol    Basic metabolic panel    CBC with platelets, differential    UA reflex to Microscopic and Culture      Orders Needing Specimen Collection     None      Pending Results     No orders found for last 3 day(s).            Pending Culture Results     No orders found for last 3 day(s).            Pending Results Instructions     If you had any lab results that were not finalized at the time of your Discharge, you can call the ED Lab Result RN at [phone]. You will be contacted by this team for any positive Lab results or changes in treatment. The nurses are available 7 days a week from 10A to 6:30P.  You can leave a message 24 hours per day and they will return your call.        Test Results From Your Hospital Stay        8/25/2018 10:20 PM       Component Results     Component Value Ref Range & Units Status    Color Urine Yellow  Final    Appearance Urine Slightly Cloudy  Final    Glucose Urine Negative NEG^Negative mg/dL Final    Bilirubin Urine Negative NEG^Negative Final    Ketones Urine Negative NEG^Negative mg/dL Final    Specific Gravity Urine 1.027 1.003 - 1.035 Final    Blood Urine Negative NEG^Negative Final    pH Urine 5.0 5.0 - 7.0 pH Final    Protein Albumin Urine 100 (A) NEG^Negative mg/dL Final    Urobilinogen mg/dL 2.0 0.0 - 2.0 mg/dL Final    Nitrite Urine Negative NEG^Negative Final    Leukocyte Esterase Urine Trace (A) NEG^Negative Final    Source Midstream Urine  Final    RBC Urine 1 0 - 2 /HPF Final    WBC Urine 11 (H) 0 - 5 /HPF Final    Bacteria Urine Few (A) NEG^Negative /HPF Final    Squamous Epithelial /HPF Urine 16 (H) 0 - 1 /HPF Final    Mucous Urine Present (A) NEG^Negative /LPF Final         8/25/2018 11:08 PM      Component Results     Component Value Ref Range & Units Status    WBC 6.4 4.0 - 11.0 10e9/L Final    RBC Count 6.71 (H) 3.8 - 5.2 10e12/L Final    Hemoglobin 18.7 (H) 11.7 - 15.7 g/dL Final    Hematocrit 59.5 (H) 35.0 - 47.0 % Final    MCV 89 78 - 100 fl Final    MCH 27.9 26.5 - 33.0 pg Final    MCHC 31.4 (L) 31.5 - 36.5 g/dL Final    RDW 20.0 (H) 10.0 - 15.0 % Final    Platelet Count 125 (L) 150 - 450 10e9/L Final    Diff Method Automated Method  Final    % Neutrophils 65.7 % Final    % Lymphocytes 25.9 % Final    % Monocytes 6.9 % Final    % Eosinophils 0.9 % Final    % Basophils 0.3 % Final    % Immature Granulocytes 0.3 % Final    Nucleated RBCs 0 0 /100 Final    Absolute Neutrophil 4.2 1.6 - 8.3 10e9/L Final    Absolute Lymphocytes 1.7 0.8 - 5.3 10e9/L Final    Absolute Monocytes 0.4 0.0 - 1.3 10e9/L Final    Absolute Eosinophils 0.1 0.0 - 0.7 10e9/L Final    Absolute Basophils 0.0 0.0 - 0.2 10e9/L Final    Abs Immature Granulocytes 0.0 0 - 0.4 10e9/L Final    Absolute Nucleated RBC 0.0  Final         8/25/2018  11:16 PM      Component Results     Component Value Ref Range & Units Status    Sodium 140 133 - 144 mmol/L Final    Potassium 3.5 3.4 - 5.3 mmol/L Final    Chloride 103 94 - 109 mmol/L Final    Carbon Dioxide 29 20 - 32 mmol/L Final    Anion Gap 8 3 - 14 mmol/L Final    Glucose 114 (H) 70 - 99 mg/dL Final    Urea Nitrogen 13 7 - 30 mg/dL Final    Creatinine 0.77 0.52 - 1.04 mg/dL Final    GFR Estimate 78 >60 mL/min/1.7m2 Final    Non  GFR Calc    GFR Estimate If Black >90 >60 mL/min/1.7m2 Final    African American GFR Calc    Calcium 8.7 8.5 - 10.1 mg/dL Final         8/25/2018 11:50 PM      Narrative     CT ABDOMEN PELVIS W/O CONTRAST  8/25/2018 11:16 PM     INDICATION: Right flank pain, history of kidney stones.     TECHNIQUE: Thin axial images through the abdomen and pelvis without  contrast. Coronal reformatted images. Radiation dose for this scan was  reduced using automated exposure control, adjustment of the mA and/or  kV according to patient size, or iterative reconstruction technique.    COMPARISON: 3/7/2018.    FINDINGS: Tiny nonobstructing stone at the lower pole of the right  kidney measuring 0.2 cm. No other urinary stones or hydronephrosis.  Fatty infiltration of the liver. Faint density in the gallbladder  which may be stones or sludge. The upper abdominal organs are  otherwise negative without contrast.    Uterus is absent. No suspicious pelvic masses. Left iliac artery  stent. No bowel obstruction, ascites or free air.        Impression     IMPRESSION:  1. Tiny nonobstructing right renal stone.  2. No other urinary stones or hydronephrosis. No other cause of pain  identified.  3. Fatty infiltration of the liver.     JEAN WATERS MD                Thank you for choosing [facility]       Thank you for choosing [facility] for your care. Our goal is always to provide you with excellent care. Hearing back from our patients is one way we can continue to improve our services. Please take  a few minutes to complete the written survey that you may receive in the mail after you visit with us. Thank you!        Care EveryWhere ID     This is your Care EveryWhere ID. This could be used by other organizations to access your [facility] medical records  [id number]        Equal Access to Services     ANTIONETTE IRBY : Elfego Clark, waldemar rust, nitin nelson. So [facility] [phone].    ATENCIÓN: Si habla español, tiene a terry disposición servicios gratuitos de asistencia lingüística. Llame al [phone].    We comply with applicable federal civil rights laws and [place] laws. We do not discriminate on the basis of race, color, national origin, age, disability, sex, sexual orientation, or gender identity.            After Visit Summary       This is your record. Keep this with you and show to your community pharmacist(s) and doctor(s) at your next visit.

## 2018-08-25 NOTE — ED AVS SNAPSHOT
[facility] Emergency Department    [address] [address]    Phone:  [phone]    Fax:  [phone]                                       Bia Bill   MRN: [id number]    Department:  [facility] Emergency Department   Date of Visit:  8/25/2018           After Visit Summary Signature Page     I have received my discharge instructions, and my questions have been answered. I have discussed any challenges I see with this plan with the nurse or doctor.    ..........................................................................................................................................  Patient/Patient Representative Signature      ..........................................................................................................................................  Patient Representative Print Name and Relationship to Patient    ..................................................               ................................................  Date                                            Time    ..........................................................................................................................................  Reviewed by Signature/Title    ...................................................              ..............................................  Date                                                            Time          22EPIC Rev 08/18

## 2018-08-26 VITALS
HEART RATE: 77 BPM | TEMPERATURE: 98.4 F | OXYGEN SATURATION: 93 % | DIASTOLIC BLOOD PRESSURE: 76 MMHG | SYSTOLIC BLOOD PRESSURE: 151 MMHG | RESPIRATION RATE: 16 BRPM

## 2018-08-26 PROCEDURE — 25000128 H RX IP 250 OP 636: Performed by: EMERGENCY MEDICINE

## 2018-08-26 PROCEDURE — 25000132 ZZH RX MED GY IP 250 OP 250 PS 637: Performed by: EMERGENCY MEDICINE

## 2018-08-26 RX ORDER — CEPHALEXIN 500 MG/1
500 CAPSULE ORAL 4 TIMES DAILY
Qty: 28 CAPSULE | Refills: 0 | Status: SHIPPED | OUTPATIENT
Start: 2018-08-26 | End: 2018-09-02

## 2018-08-26 RX ORDER — CEPHALEXIN 500 MG/1
500 CAPSULE ORAL ONCE
Status: COMPLETED | OUTPATIENT
Start: 2018-08-26 | End: 2018-08-26

## 2018-08-26 RX ORDER — PHENAZOPYRIDINE HYDROCHLORIDE 200 MG/1
200 TABLET, FILM COATED ORAL 3 TIMES DAILY
Qty: 8 TABLET | Refills: 0 | Status: SHIPPED | OUTPATIENT
Start: 2018-08-26 | End: 2018-08-29

## 2018-08-26 RX ADMIN — SODIUM CHLORIDE, POTASSIUM CHLORIDE, SODIUM LACTATE AND CALCIUM CHLORIDE 1000 ML: 600; 310; 30; 20 INJECTION, SOLUTION INTRAVENOUS at 00:07

## 2018-08-26 RX ADMIN — CEPHALEXIN 500 MG: 500 CAPSULE ORAL at 00:08

## 2018-08-26 NOTE — DISCHARGE INSTRUCTIONS
"  Dysuria     Painful urination (dysuria) is often caused by a problem in the urinary tract.   Dysuria is pain felt during urination. It is often described as a burning. Learn more about this problem and how it can be treated.  What causes dysuria?  Possible causes include:    Infection with a bacteria or virus such as a urinary tract infection (UTI or a sexually transmitted infection (STI)    Sensitivity or allergy to chemicals such as those found in lotions and other products    Prostate or bladder problems    Radiation therapy to the pelvic area  How is dysuria diagnosed?  Your healthcare provider will examine you. He or she will ask about your symptoms and health. After talking with you and doing a physical exam, your healthcare provider may know what is causing your dysuria. He or she will usually request  a sample of your urine. Tests of your urine, or a \"urinalysis,\" are done. A urinalysis may include:    Looking at the urine sample (visual exam)    Checking for substances (chemical exam)    Looking at a small amount under a microscope (microscopic exam)  Some parts of the urinalysis may be done in the provider's office and some in a lab. And, the urine sample may be checked for bacteria and yeast (urine culture). Your healthcare provider will tell you more about these tests if they are needed.  How is dysuria treated?  Treatment depends on the cause. If you have a bacterial infection, you may need antibiotics. You may be given medicines to make it easier for you to urinate and help relieve pain. Your healthcare provider can tell you more about your treatment options. Untreated, symptoms may get worse.  When to call your healthcare provider  Call the healthcare provider right away if you have any of the following:    Fever of 100.4 F (38 C) or higher     No improvement after three days of treatment    Trouble urinating because of pain    New or increased discharge from the vagina or penis    Rash or joint " pain    Increased back or abdominal pain    Enlarged painful lymph nodes (lumps) in the groin   Date Last Reviewed: 1/1/2017 2000-2017 [employer]. [address] [place] [address], [place] [address]. All rights reserved. This information is not intended as a substitute for professional medical care. Always follow your healthcare professional's instructions.

## 2018-08-26 NOTE — ED NOTES
Pt states having right flank pain that started on Thursday but pain now radiating to left side. Nausea. Denies fever
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
## 2018-08-27 ENCOUNTER — PATIENT OUTREACH (OUTPATIENT)
Dept: CARE COORDINATION | Facility: CLINIC | Age: 53
End: 2018-08-27

## 2018-08-27 LAB
BACTERIA SPEC CULT: ABNORMAL
BACTERIA SPEC CULT: ABNORMAL
SPECIMEN SOURCE: ABNORMAL

## 2018-08-27 NOTE — PROGRESS NOTES
Clinic Care Coordination Contact    Clinic Care Coordination Contact  OUTREACH    Referral Information:  Referral Source: ED Follow-Up         Chief Complaint   Patient presents with     Clinic Care Coordination - Post Hospital     Care Team        [place] Utilization:      Utilization    Last refreshed: 8/27/2018  1:54 PM:  No Show Count (past year) 2       Last refreshed: 8/27/2018  1:54 PM:  ED visits 4       Last refreshed: 8/27/2018  1:54 PM:  Hospital admissions 1          Current as of: 8/27/2018  1:54 PM             Clinical Concerns:  Current Medical Concerns:  Patient reports back pain and L/E swelling and redness are the same as when in ED. Urination is only slightly better. Patient has been on Antibiotics for 2 days now. Patient has follow ups scheduled but for next week.     Current Behavioral Concerns: none    Education Provided to patient: Encouraged follow up appointment with PCP.     Medication Management:  Patient is knowledgeable on medications and is adherent. No financial concerns reported at this time.           Future Appointments              In 1 week Kd Morris [facility], [facility]    In 1 week Renuka Mckenzie APRN CNP [facility]    In 2 weeks Kd Leong MD [facility]    In 3 weeks [facility], [facility]    In 3 weeks [facility]    In 1 month [facility] [place] [facility]          Plan: 1) Patient will continue to follow treatment plan as directed and follow up with PCP with concerns ongoing.   2) Care Coordination to remain available for future needs. Follow up planned for 3 days unless otherwise notified.     Wei Gray RN  Clinic Care Coordinator  [phone] or [phone]

## 2018-08-29 DIAGNOSIS — G25.81 RESTLESS LEG SYNDROME: ICD-10-CM

## 2018-08-29 RX ORDER — PRAMIPEXOLE DIHYDROCHLORIDE 0.12 MG/1
TABLET ORAL
Qty: 60 TABLET | Refills: 10 | Status: SHIPPED | OUTPATIENT
Start: 2018-08-29 | End: 2019-06-12

## 2018-08-29 NOTE — TELEPHONE ENCOUNTER
Prescription approved per [facility] Refill Protocol.  Mundo Conde RN

## 2018-08-29 NOTE — TELEPHONE ENCOUNTER
PRAMIPEXOLE 0.125MG TAB LUCIA        Last Written Prescription Date:  8/1/18  Last Fill Quantity: 60,   # refills: 0  Last Office Visit: 5/31/18  Future Office visit:    Next 5 appointments (look out 90 days)     Sep 04, 2018  9:30 AM CDT   Return Visit with Kd Morris   [facility] ([facility])    [address] [address]   [phone]            Sep 06, 2018  1:20 PM CDT   SHORT with LEONIDES Dover CNP   [facility] ([facility]    [address] [place] [address]   [phone]            Sep 12, 2018  1:20 PM CDT   Office Visit with Kd Leong MD   [facility] ([facility]    [address] [address]   [phone]            Sep 21, 2018 12:00 PM CDT   Return Visit with Kd Morris   [facility] ([facility])    [address] [address]   [phone]            Sep 28, 2018  1:30 PM CDT   Return Visit with Joshua Watts MD   [facility]    [facility]  [address] [place] [address]   [phone]                   Requested Prescriptions   Pending Prescriptions Disp Refills     pramipexole (MIRAPEX) 0.125 MG tablet [Pharmacy Med Name: PRAMIPEXOLE 0.125MG TAB LUCIA] 60 tablet 0     Sig: TAKE 1-2 TABLETS BY MOUTH AT BEDTIME    Antiparkinson's Agents Protocol Failed    8/29/2018 11:24 AM       Failed - Blood pressure under 140/90 in past 12 months    BP Readings from Last 3 Encounters:   08/26/18 151/76   08/22/18 138/87   08/15/18 108/62                Passed - CBC on record in past 12 months    Recent Labs   Lab Test  08/25/18   2242   WBC  6.4   RBC  6.71*   HGB  18.7*   HCT  59.5*   PLT  125*       For [facility] ONLY: [id number] = WBC, [id number] = RBC         Passed - ALT on record in past 12 months        Recent Labs   Lab Test   "05/16/18   1043   ALT  34            Passed - Serum Creatinine on file in past 12 months    Recent Labs   Lab Test  08/25/18   2242   12/12/11   1911   CR  0.77   < >   --    CREAT   --    --   0.7    < > = values in this interval not displayed.            Passed - Patient is age 18 or older       Passed - No active pregnancy on record       Passed - No positive pregnancy test in the past 12 months       Passed - Recent (6 mo) or future (30 days) visit within the authorizing provider's specialty    Patient had office visit in the last 6 months or has a visit in the next 30 days with authorizing provider or within the authorizing provider's specialty.  See \"Patient Info\" tab in inbasket, or \"Choose Columns\" in Meds & Orders section of the refill encounter.              "

## 2018-08-30 ENCOUNTER — PATIENT OUTREACH (OUTPATIENT)
Dept: CARE COORDINATION | Facility: CLINIC | Age: 53
End: 2018-08-30

## 2018-08-30 NOTE — PROGRESS NOTES
Clinic Care Coordination Contact    Clinic Care Coordination Contact  OUTREACH    Referral Information:            Chief Complaint   Patient presents with     Clinic Care Coordination - Follow-up     Care Team        [place] Utilization:      Utilization    Last refreshed: 8/29/2018  3:18 PM:  No Show Count (past year) 2       Last refreshed: 8/29/2018  3:18 PM:  ED visits 4       Last refreshed: 8/29/2018  3:18 PM:  Hospital admissions 1          Current as of: 8/29/2018  3:18 PM             Clinical Concerns:  Current Medical Concerns:  Patient reports swelling is coming down slightly. Redness is still present. Back pain is still there. Patient has follow up appointment with providers set.  No new concerns reported.      Current Behavioral Concerns: none    Education Provided to patient: Encouraged follow up appointment with PCP.        Medication Management:  Patient is knowledgeable on medications and is adherent. No financial concerns reported at this time.         Future Appointments              In 5 days [facility], [facility]    In 1 week Renuka Mckenzie APRN CNP [facility]    In 1 week Kd Leong MD [facility]    In 3 weeks [facility], [facility]    In 3 weeks [facility]    In 4 weeks [facility]          Plan: 1) Patient will continue to follow treatment plan as directed and follow up with PCP with concerns ongoing.   2) Care Coordination to remain available for future needs. Follow up planned for next week unless otherwise notified.     Wei Gray RN  Clinic Care Coordinator  [phone] or [phone]

## 2018-08-31 DIAGNOSIS — F33.1 MAJOR DEPRESSIVE DISORDER, RECURRENT EPISODE, MODERATE (H): ICD-10-CM

## 2018-08-31 RX ORDER — ARIPIPRAZOLE 5 MG/1
TABLET ORAL
Qty: 45 TABLET | Refills: 1 | Status: SHIPPED | OUTPATIENT
Start: 2018-08-31 | End: 2018-09-12

## 2018-08-31 NOTE — TELEPHONE ENCOUNTER
Requested Prescriptions   Pending Prescriptions Disp Refills     ARIPiprazole (ABILIFY) 5 MG tablet [Pharmacy Med Name: ARIPIPRAZOLE 5 MG   TAB CAMB] 45 tablet 1    Last Written Prescription Date:  6/13/18  Last Fill Quantity: 45,  # refills: 2   Last office visit: No previous visit found with prescribing provider:  8/15/18 shola   Future Office Visit:   Next 5 appointments (look out 90 days)     Sep 04, 2018  9:30 AM CDT   Return Visit with Kd Morris   [facility] ([facility])    [address] [facility] [address]   [phone]            Sep 06, 2018  1:20 PM CDT   SHORT with LEONIDES Dover CNP   [facility] ([facility])    [address] [place] [address]   [phone]            Sep 12, 2018  1:20 PM CDT   Office Visit with Kd Leong MD   [facility] ([facility]    [address] [address]   [phone]            Sep 21, 2018 12:00 PM CDT   Return Visit with Kd Morris   [facility] ([facility])    [address] [facility] [address]   [phone]            Sep 28, 2018  1:30 PM CDT   Return Visit with Joshua Watts MD   [facility] ([facility])    [facility]  [address] [address]   [phone]                  Sig: TAKE 1 & 1/2 TABLETS BY MOUTH AT BEDTIME    Antipsychotic Medications Failed    8/31/2018  8:08 AM       Failed - Blood pressure under 140/90 in past 12 months    BP Readings from Last 3 Encounters:   08/26/18 151/76   08/22/18 138/87   08/15/18 108/62                Passed - Patient is 12 years of age or older       Passed - Lipid panel on file within the past 12 months    Recent Labs   Lab Test  04/06/18   1316   09/10/15   1332   CHOL  187   < >  135   TRIG  198*   < >  128   HDL  46*   < >  27*   LDL  101*   < >  82   NHDL  " 141*   < >   --    VLDL   --    --   26   CHOLHDLRATIO   --    --   5.0    < > = values in this interval not displayed.              Passed - CBC on file in past 12 months    Recent Labs   Lab Test  08/25/18   2242   WBC  6.4   RBC  6.71*   HGB  18.7*   HCT  59.5*   PLT  125*       For [facility] ONLY: [id number] = WBC, TWZB748 = RBC         Passed - Heart Rate on file within past 12 months    Pulse Readings from Last 3 Encounters:   08/25/18 77   08/15/18 80   05/31/18 80              Passed - A1c or Glucose on file in past 12 months    Recent Labs   Lab Test  08/25/18   2242   12/29/16   1415   GLC  114*   < >   --    A1C   --    --   6.2*    < > = values in this interval not displayed.       Please review patients last 3 weights. If a weight gain of >10 lbs exists, you may refill the prescription once after instructing the patient to schedule an appointment within the next 30 days.    Wt Readings from Last 3 Encounters:   08/22/18 234 lb (106.1 kg)   08/15/18 231 lb 9.6 oz (105.1 kg)   07/10/18 230 lb (104.3 kg)            Passed - Patient is not pregnant       Passed - No positve pregnancy test on file in past 12 months       Passed - Recent (6 mo) or future (30 days) visit within the authorizing provider's specialty    Patient had office visit in the last 6 months or has a visit in the next 30 days with authorizing provider or within the authorizing provider's specialty.  See \"Patient Info\" tab in inbasket, or \"Choose Columns\" in Meds & Orders section of the refill encounter.              "

## 2018-08-31 NOTE — TELEPHONE ENCOUNTER
**This refill requires provider completion and is not appropriate for RN review per RN refill guidelines.**  PH-Q9 needs to be less than 5 to approve medication on RN Refill protocol pt's score is 8.  Bailey Lozano RN

## 2018-09-04 ENCOUNTER — OFFICE VISIT (OUTPATIENT)
Dept: PSYCHOLOGY | Facility: CLINIC | Age: 53
End: 2018-09-04
Payer: COMMERCIAL

## 2018-09-04 ENCOUNTER — ANTICOAGULATION THERAPY VISIT (OUTPATIENT)
Dept: ANTICOAGULATION | Facility: CLINIC | Age: 53
End: 2018-09-04

## 2018-09-04 DIAGNOSIS — F31.62 MODERATE MIXED BIPOLAR I DISORDER (H): Primary | ICD-10-CM

## 2018-09-04 DIAGNOSIS — D75.1 ERYTHROCYTOSIS: Chronic | ICD-10-CM

## 2018-09-04 DIAGNOSIS — I82.409 DVT (DEEP VENOUS THROMBOSIS) (H): ICD-10-CM

## 2018-09-04 DIAGNOSIS — I73.9 PVD (PERIPHERAL VASCULAR DISEASE) WITH CLAUDICATION (H): ICD-10-CM

## 2018-09-04 DIAGNOSIS — Z79.01 LONG TERM CURRENT USE OF ANTICOAGULANT THERAPY: ICD-10-CM

## 2018-09-04 DIAGNOSIS — I73.9 PERIPHERAL VASCULAR DISEASE WITH CLAUDICATION (H): ICD-10-CM

## 2018-09-04 LAB
HCT VFR BLD AUTO: 59.6 % (ref 35–47)
HGB BLD-MCNC: 18.9 G/DL (ref 11.7–15.7)
INR PPP: 2.51 (ref 0.86–1.14)

## 2018-09-04 PROCEDURE — 90834 PSYTX W PT 45 MINUTES: CPT | Performed by: PSYCHOLOGIST

## 2018-09-04 PROCEDURE — 99207 ZZC NO CHARGE NURSE ONLY: CPT

## 2018-09-04 PROCEDURE — 85018 HEMOGLOBIN: CPT | Performed by: FAMILY MEDICINE

## 2018-09-04 PROCEDURE — 85610 PROTHROMBIN TIME: CPT | Performed by: FAMILY MEDICINE

## 2018-09-04 PROCEDURE — 36415 COLL VENOUS BLD VENIPUNCTURE: CPT | Performed by: FAMILY MEDICINE

## 2018-09-04 PROCEDURE — 85014 HEMATOCRIT: CPT | Performed by: FAMILY MEDICINE

## 2018-09-04 NOTE — MR AVS SNAPSHOT
Bia Bill   9/4/2018   Anticoagulation Therapy Visit    Description:  52 year old female   Provider:  Reina Forrest, RN   Department:  [facility]           INR as of 9/4/2018     Today's INR 2.51!      Anticoagulation Summary as of 9/4/2018     INR goal 2.0-2.5   Today's INR 2.51!   Full warfarin instructions 10 mg on Mon, Wed, Fri; 12.5 mg all other days   Next INR check 10/5/2018    Indications   Long term current use of anticoagulant therapy [Z79.01]  DVT (deep venous thrombosis) (H) [I82.409]  PVD (peripheral vascular disease) with claudication (H) [I73.9]         September 2018 Details    Sun Mon Tue Wed Thu Fri Sat           1                 2               3               4      12.5 mg   See details      5      10 mg         6      12.5 mg         7      10 mg         8      12.5 mg           9      12.5 mg         10      10 mg         11      12.5 mg         12      10 mg         13      12.5 mg         14      10 mg         15      12.5 mg           16      12.5 mg         17      10 mg         18      12.5 mg         19      10 mg         20      12.5 mg         21      10 mg         22      12.5 mg           23      12.5 mg         24      10 mg         25      12.5 mg         26      10 mg         27      12.5 mg         28      10 mg         29      12.5 mg           30      12.5 mg                Date Details   09/04 This INR check               How to take your warfarin dose     To take:  10 mg Take 2 of the 5 mg tablets.    To take:  12.5 mg Take 2.5 of the 5 mg tablets.           October 2018 Details    Sun Mon Tue Wed Thu Fri Sat      1      10 mg         2      12.5 mg         3      10 mg         4      12.5 mg         5            6                 7               8               9               10               11               12               13                 14               15               16               17               18               19               20                  21               22               23               24               25               26               27                 28               29               30               31                   Date Details   No additional details    Date of next INR:  10/5/2018         How to take your warfarin dose     To take:  10 mg Take 2 of the 5 mg tablets.    To take:  12.5 mg Take 2.5 of the 5 mg tablets.

## 2018-09-04 NOTE — MR AVS SNAPSHOT
MRN:[id number]                      After Visit Summary   9/4/2018    Bia Bill    MRN: [id number]           Visit Information        Provider Department      9/4/2018 9:30 AM Kd Morris [facility]      Your next 10 appointments already scheduled     Sep 12, 2018  1:20 PM CDT   Office Visit with Kd Leong MD   [facility] ([facility])    [address] [facility] [address]   [phone]           Bring a current list of meds and any records pertaining to this visit. For Physicals, please bring immunization records and any forms needing to be filled out. Please arrive 10 minutes early to complete paperwork.            Sep 21, 2018 12:00 PM CDT   Return Visit with Kd Morris   [facility] ([facility])    [address] [facility] [address]   [phone]            Sep 21, 2018  1:00 PM CDT   LAB with [facility] ([facility]    [address] [address]   [phone]           Please do not eat 10-12 hours before your appointment if you are coming in fasting for labs on lipids, cholesterol, or glucose (sugar). This does not apply to pregnant women. Water, hot tea and black coffee (with nothing added) are okay. Do not drink other fluids, diet soda or chew gum.            Sep 28, 2018  1:30 PM CDT   Return Visit with Joshua Watts MD   [facility] ([facility])    [facility]  [address] [facility] [address]  [facility] [address]   [phone]            Oct 05, 2018  9:30 AM CDT   Return Visit with Kd Morris   [facility] ([facility])    [address] [facility] [address]   [phone]              MyChart Information     MyChart lets you send messages to your doctor, view your test results, renew your prescriptions, schedule  "appointments and more. To sign up, go to [email or web address] . Click on \"Log in\" on the left side of the screen, which will take you to the Welcome page. Then click on \"Sign up Now\" on the right side of the page.     You will be asked to enter the access code listed below, as well as some personal information. Please follow the directions to create your username and password.     Your access code is: [email or web address]  Expires: 12/10/2018 10:27 AM     Your access code will  in 90 days. If you need help or a new code, please call your [facility] clinic or [phone].        Care EveryWhere ID     This is your Care EveryWhere ID. This could be used by other organizations to access your [facility] medical records  [id number]        Equal Access to Services     ANTIONETTE IRBY : Elfego Clark, waldemar rust, emma clay, nitin craig. So [facility] [phone].    ATENCIÓN: Si habla español, tiene a terry disposición servicios gratuitos de asistencia lingüística. Llame al [phone].    We comply with applicable federal civil rights laws and [place] laws. We do not discriminate on the basis of race, color, national origin, age, disability, sex, sexual orientation, or gender identity.            "

## 2018-09-06 ENCOUNTER — OFFICE VISIT (OUTPATIENT)
Dept: FAMILY MEDICINE | Facility: CLINIC | Age: 53
End: 2018-09-06
Payer: COMMERCIAL

## 2018-09-06 VITALS
TEMPERATURE: 98.5 F | SYSTOLIC BLOOD PRESSURE: 110 MMHG | WEIGHT: 234 LBS | BODY MASS INDEX: 48.91 KG/M2 | HEART RATE: 74 BPM | RESPIRATION RATE: 20 BRPM | OXYGEN SATURATION: 93 % | DIASTOLIC BLOOD PRESSURE: 68 MMHG

## 2018-09-06 DIAGNOSIS — M25.562 ACUTE PAIN OF LEFT KNEE: ICD-10-CM

## 2018-09-06 DIAGNOSIS — Z23 NEED FOR PROPHYLACTIC VACCINATION AND INOCULATION AGAINST INFLUENZA: ICD-10-CM

## 2018-09-06 DIAGNOSIS — R30.0 DYSURIA: Primary | ICD-10-CM

## 2018-09-06 LAB
ALBUMIN UR-MCNC: NEGATIVE MG/DL
APPEARANCE UR: CLEAR
BILIRUB UR QL STRIP: NEGATIVE
COLOR UR AUTO: YELLOW
GLUCOSE UR STRIP-MCNC: NEGATIVE MG/DL
HGB UR QL STRIP: NEGATIVE
KETONES UR STRIP-MCNC: NEGATIVE MG/DL
LEUKOCYTE ESTERASE UR QL STRIP: NEGATIVE
NITRATE UR QL: NEGATIVE
PH UR STRIP: 6.5 PH (ref 5–7)
SOURCE: NORMAL
SP GR UR STRIP: 1.01 (ref 1–1.03)
UROBILINOGEN UR STRIP-ACNC: 0.2 EU/DL (ref 0.2–1)

## 2018-09-06 PROCEDURE — 81003 URINALYSIS AUTO W/O SCOPE: CPT | Performed by: NURSE PRACTITIONER

## 2018-09-06 PROCEDURE — 90682 RIV4 VACC RECOMBINANT DNA IM: CPT | Performed by: NURSE PRACTITIONER

## 2018-09-06 PROCEDURE — 99213 OFFICE O/P EST LOW 20 MIN: CPT | Mod: 25 | Performed by: NURSE PRACTITIONER

## 2018-09-06 PROCEDURE — 90471 IMMUNIZATION ADMIN: CPT | Performed by: NURSE PRACTITIONER

## 2018-09-06 NOTE — MR AVS SNAPSHOT
After Visit Summary   9/6/2018    Bia Bill    MRN: [id number]           Patient Information     Date Of Birth          1965        Visit Information        Provider Department      9/6/2018 1:20 PM Renuka Mckenzie APRN [facility]        Today's Diagnoses     Dysuria    -  1    Need for prophylactic vaccination and inoculation against influenza           Follow-ups after your visit        Your next 10 appointments already scheduled     Sep 12, 2018  1:20 PM CDT   Office Visit with Kd Leong MD   [facility]    [address] [address]   [phone]           Bring a current list of meds and any records pertaining to this visit. For Physicals, please bring immunization records and any forms needing to be filled out. Please arrive 10 minutes early to complete paperwork.            Sep 21, 2018 12:00 PM CDT   Return Visit with Kd Morris   [facility] ([facility])    [address] [facility] [address]   [phone]            Sep 21, 2018  1:00 PM CDT   LAB with [facility] ([address] [address]   [phone]           Please do not eat 10-12 hours before your appointment if you are coming in fasting for labs on lipids, cholesterol, or glucose (sugar). This does not apply to pregnant women. Water, hot tea and black coffee (with nothing added) are okay. Do not drink other fluids, diet soda or chew gum.            Sep 28, 2018  1:30 PM CDT   Return Visit with Joshua Watts MD   [facility] ([facility])    [facility]  [address]  [facility] [address]   [phone]            Oct 05, 2018  9:30 AM CDT   Return Visit with Kd Morris   [facility] ([address]  [place] [address] [phone]              Who to contact     If you have questions or need follow up information about today's clinic visit or your schedule please contact [facility] directly at [phone].  Normal or non-critical lab and imaging results will be communicated to you by MyChart, letter or phone within 4 business days after the clinic has received the results. If you do not hear from us within 7 days, please contact the clinic through MyChart or phone. If you have a critical or abnormal lab result, we will notify you by phone as soon as possible.  Submit refill requests through [email or web address] or call your pharmacy and they will forward the refill request to us. Please allow 3 business days for your refill to be completed.          Additional Information About Your Visit        Care EveryWhere ID     This is your Care EveryWhere ID. This could be used by other organizations to access your [facility] medical records  [id number]        Your Vitals Were     Pulse Temperature Respirations Last Period Pulse Oximetry BMI (Body Mass Index)    74 98.5  F (36.9  C) (Tympanic) 20 09/27/2009 93% 48.91 kg/m2       Blood Pressure from Last 3 Encounters:   09/06/18 110/68   08/26/18 151/76   08/22/18 138/87    Weight from Last 3 Encounters:   09/06/18 234 lb (106.1 kg)   08/22/18 234 lb (106.1 kg)   08/15/18 231 lb 9.6 oz (105.1 kg)              We Performed the Following     *UA reflex to Microscopic and Culture ([facility] (except [place] and [place])     FLU VACCINE, (RIV4) RECOMBINANT HA  , IM (FluBlok, egg free) [56404]- >18 YRS (FMG recommended  50-64 YRS)     Vaccine Administration, Initial [89987]        Primary Care Provider Office Phone # Fax #    Kd Leong [phone] [address] [address]        Equal Access to Services     ANTIONETTE IRBY AH: Elfego Clark, wahoada luqshena, qaybnitin hernandez  jayne jasonmiller nikoleevonne laca ah. So [facility] [phone].    ATENCIÓN: Si khaila chino, tiene a terry disposición servicios gratuitos de asistencia lingüística. Natalie hong [phone].    We comply with applicable federal civil rights laws and [place] laws. We do not discriminate on the basis of race, color, national origin, age, disability, sex, sexual orientation, or gender identity.            Thank you!     Thank you for choosing [facility]  for your care. Our goal is always to provide you with excellent care. Hearing back from our patients is one way we can continue to improve our services. Please take a few minutes to complete the written survey that you may receive in the mail after your visit with us. Thank you!             Your Updated Medication List - Protect others around you: Learn how to safely use, store and throw away your medicines at www.disposemymeds.org.          This list is accurate as of 9/6/18  2:30 PM.  Always use your most recent med list.                   Brand Name Dispense Instructions for use Diagnosis    acetaminophen 500 MG tablet    TYLENOL     Take 500-1,000 mg by mouth every 6 hours as needed for mild pain        * albuterol 108 (90 Base) MCG/ACT inhaler    PROAIR HFA/PROVENTIL HFA/VENTOLIN HFA    3 Inhaler    Inhale 2 puffs into the lungs every 6 hours as needed for shortness of breath / dyspnea or wheezing    Mild persistent asthma without complication       * albuterol (2.5 MG/3ML) 0.083% neb solution     1 Box    Take 1 vial (2.5 mg) by nebulization every 6 hours as needed for shortness of breath / dyspnea or wheezing    Mild persistent asthma without complication       allopurinol 100 MG tablet    ZYLOPRIM    30 tablet    TAKE ONE TABLET BY MOUTH ONE TIME DAILY    Acute gouty arthritis       ARIPiprazole 5 MG tablet    ABILIFY    45 tablet    TAKE 1 & 1/2 TABLETS BY MOUTH AT BEDTIME    Major depressive disorder, recurrent episode, moderate (H)       buPROPion 150  MG 24 hr tablet    WELLBUTRIN XL    30 tablet    TAKE  ONE TABLET BY MOUTH EVERY DAY IN THE MORNING    COPD exacerbation (H)       cholecalciferol 1000 UNIT tablet    vitamin D3     Take 1,000 Units by mouth daily        EPINEPHrine 0.3 MG/0.3ML injection 2-pack    EPIPEN/ADRENACLICK/or ANY BX GENERIC EQUIV    0.6 mL    Inject 0.3 mLs (0.3 mg) into the muscle once as needed for anaphylaxis    Anaphylactic reaction to bee sting, undetermined intent, subsequent encounter       furosemide 20 MG tablet    LASIX    180 tablet    TAKE TWO TABLETS BY MOUTH TWICE DAILY    Lymphedema of both lower extremities       * gabapentin 300 MG capsule    NEURONTIN    90 capsule    ONE CAPSULE BY MOUTH AT BEDTIME WITH 600 MG CAPSULE- FOR TOTAL  MG AT BEDTIME    Polyneuropathy in other diseases classified elsewhere (H)       * gabapentin 600 MG tablet    NEURONTIN    90 tablet    TAKE ONE TABLET BY MOUTH THREE TIMES DAILY    Polyneuropathy in other diseases classified elsewhere (H)       ipratropium - albuterol 0.5 mg/2.5 mg/3 mL 0.5-2.5 (3) MG/3ML neb solution    DUONEB    30 vial    Take 1 vial (3 mLs) by nebulization every 6 hours as needed for shortness of breath / dyspnea or wheezing    Chronic obstructive pulmonary disease, unspecified COPD type (H)       lamoTRIgine 200 MG tablet    LaMICtal    30 tablet    TAKE 1 TABLET (200 MG) BY MOUTH DAILY    Major depressive disorder, recurrent episode, moderate (H)       levothyroxine 150 MCG tablet    SYNTHROID/LEVOTHROID    90 tablet    Take 1 tablet (150 mcg) by mouth daily    Hypothyroidism, unspecified type       lidocaine 5 % ointment    XYLOCAINE    240 g    Apply topically 3 times daily as needed for moderate pain    Acute pain of left knee       mometasone-formoterol 200-5 MCG/ACT oral inhaler    DULERA    13 g    Inhale 2 puffs into the lungs 2 times daily    COPD exacerbation (H), Chronic obstructive pulmonary disease, unspecified COPD type (H)       omeprazole 20 MG CR  "capsule    priLOSEC    180 capsule    TAKE ONE CAPSULE BY MOUTH TWICE DAILY    Gastroesophageal reflux disease without esophagitis       * order for DME      Equipment being ordered: CPAP AIRSENSE 10 5-18 CM H20 # [id number]   DN# [id number]        * order for DME     1 Month    Equipment being ordered: INCONTINENCE PADS  QID    Other urinary incontinence       * order for DME     60 Month    Equipment being ordered: DEPENDS SIZE LARGE    Mixed incontinence       * order for DME     2 Units    Equipment being ordered: UputrdTltm9970\" x2pair   \"20-30mmHg Farrow Hybrid Liners\" x 2 pair    Peripheral edema       order for DME     1 Device    Equipment being ordered: Nebulizer    COPD exacerbation (H), Chronic obstructive pulmonary disease, unspecified COPD type (H)       order for DME     1 each    Equipment being ordered:x2 [employer] 30/40mmHg compression wraps with x2 extra prs of compression liners    Secondary lymphedema       potassium chloride SA 10 MEQ CR tablet    K-DUR/KLOR-CON M    30 tablet    TAKE ONE TABLET BY MOUTH ONE TIME DAILY    Lymphedema       pramipexole 0.125 MG tablet    MIRAPEX    60 tablet    TAKE 1-2 TABLETS BY MOUTH AT BEDTIME    Restless leg syndrome       simvastatin 20 MG tablet    ZOCOR    90 tablet    TAKE ONE TABLET BY MOUTH AT BEDTIME    Hypercholesteremia       traZODone 150 MG tablet    DESYREL    30 tablet    TAKE ONE TABLET BY MOUTH NIGHTLY AT BEDTIME    Major depressive disorder, recurrent episode, moderate (H)       warfarin 5 MG tablet    COUMADIN    180 tablet    Take 10 mg on Mon, Wed, Fri; 12.5 mg all other days or as directed by the Anticoagulation Clinic    DVT (deep venous thrombosis) (H)       * Notice:  This list has 8 medication(s) that are the same as other medications prescribed for you. Read the directions carefully, and ask your doctor or other care provider to review them with you.      "

## 2018-09-06 NOTE — PROGRESS NOTES
SUBJECTIVE:   Bia Bill is a 52 year old female who presents to clinic today for the following health issues:    Left knee pain ongoing with swelling.   Limiting her mobility.   Sleep has been affected by this.  Previous DVT per patient report.   Moods are stable per her report.   Due for follow up on her erythrocytosis  No complaints or night sweat,weight loss or chills.   No N/V or diarrhea. Some pain with urination.   Continues to smoke. Not ready to quit  Bone marrow biopsy on 11/17/2014 showed no clonal chromosomal abnormality and no cytogenetic evidence of a malignant process. She also tested negative for the JAK2 mutation. Leukemia lymphoma evaluation additionally showed no aberrant immunophenotype on T cells and no increase in or abnormal appearing myeloid blasts.   TSH   Date Value Ref Range Status   04/06/2018 1.07 0.40 - 4.00 mU/L Final     LDL Cholesterol Calculated   Date Value Ref Range Status   04/06/2018 101 (H) <100 mg/dL Final     Comment:     Above desirable:  100-129 mg/dl  Borderline High:  130-159 mg/dL  High:             160-189 mg/dL  Very high:       >189 mg/dl       BP reviewed today.    Recent MRI left knee     Results   FINDINGS:   Medial Meniscus: No tear, displaced fragment, or extrusion.        Lateral Meniscus: No tear, displaced fragment, or extrusion.        Anterior Cruciate Ligament: Intact.      Posterior Cruciate Ligament: Intact.      Medial Collateral Ligament: Intact.     Lateral Collateral Ligament Complex, Popliteus Tendon: The fibular  collateral ligament, biceps femoris tendon, popliteal tendon, and  iliotibial band are intact.     Osseous Structures and Cartilaginous Surfaces: There is abnormal bone  marrow signal with increased signal in the metadiaphyseal regions of  the femur and tibia. This patient has a history of polycythemia which  is likely the cause of this appearance.     Extensor Mechanism: The quadriceps and infrapatellar tendons are  intact. The  medial and lateral patellar retinacula appear  unremarkable.     Joint Space: Small joint effusion. No definite articular bodies are  demonstrated.     Additional Findings: No semimembranosus-tibial collateral ligament or  pes anserine bursitis. No Baker's cyst. There is subcutaneous edema  surrounding the knee.         IMPRESSION:    1. No evidence of meniscal tear or ligamentous injury.  2. Abnormal bone marrow signal likely related to patient's known  polycythemia.   3. Subcutaneous edema.     EVELYN BUCHANAN MD    Pt. Was recently diagnosed with E. Coli; would like to know if her most recent tests show if she still has that.      -------------------------------------    Problem list and histories reviewed & adjusted, as indicated.  Additional history: as documented    Patient Active Problem List   Diagnosis     Mild persistent asthma     Polyneuropathy in other diseases classified elsewhere (H)     DVT (deep venous thrombosis) (H)     Alcohol abuse, in remission     SECONDARY POLYCYTHEMIA     Chondromalacia of patella     Sensorineural hearing loss, asymmetrical     Developmental reading disorder     Esophageal reflux     Hypothyroidism     Fatty liver     GILES (Obstructive Sleep Apnea)-Moderate (AHI 16)     RLS (restless legs syndrome)     Smoker     Erythrocytosis     Moderate mixed bipolar I disorder (H)     Personality disorder, depressive     COPD (chronic obstructive pulmonary disease) (H)     Current use of long term anticoagulation     Rosacea     Health Care Home     DDD (degenerative disc disease), cervical     Benign neoplasm of colon (POLYPOSIS)     Stroke (H)     Somatization disorder     Sebaceous cyst of right axilla     HTN, goal below 140/90     Left leg pain     Vitamin D deficiency     Long term current use of anticoagulant therapy     Morbid obesity (H)     PVD (peripheral vascular disease) with claudication (H)     Cyst of left ovary     Morbid obesity with alveolar hypoventilation (H)     GI  bleed     Severe episode of recurrent major depressive disorder (H)     Conductive hearing loss of left ear with restricted hearing of right ear     Sensorineural hearing loss (SNHL) of right ear with restricted hearing of left ear     Past Surgical History:   Procedure Laterality Date     BILIARY STENT SINGLE USE COV      3 stints in left leg     BONE MARROW BIOPSY, BONE SPECIMEN, NEEDLE/TROCAR N/A 11/17/2014    Procedure: BIOPSY BONE MARROW;  Surgeon: Hay Aaron MD;  Location: [facility]     D & C  10/26/09    with uterine ablation     ESOPHAGOSCOPY, GASTROSCOPY, DUODENOSCOPY (EGD), COMBINED  4/21/2014    Procedure: Gastroscopy;  Surgeon: Moris Thomas MD;  Location: [facility]     HYSTERECTOMY, PAP NO LONGER INDICATED  1-4-2010     LITHOTRIPSY  2004    Lithotrypsy       Social History   Substance Use Topics     Smoking status: Current Every Day Smoker     Packs/day: 0.50     Years: 34.00     Types: Cigarettes     Smokeless tobacco: Never Used      Comment: started at age 11.  Quit during pregnancyX4.  Quit 3 months ago.      Alcohol use No      Comment: quit 1995     Family History   Problem Relation Age of Onset     Hypertension Mother      Diabetes Mother      Blood Disease Mother      HEART DISEASE Mother      chf     Cerebrovascular Disease Mother      Hypertension Father      Cancer Father      lung cancer     Allergies Sister      Diabetes Sister      Depression Sister      Hypertension Sister          BP Readings from Last 3 Encounters:   09/28/18 125/52   09/12/18 134/63   09/06/18 110/68    Wt Readings from Last 3 Encounters:   09/28/18 230 lb 9.6 oz (104.6 kg)   09/12/18 232 lb 12.8 oz (105.6 kg)   09/06/18 234 lb (106.1 kg)                  Labs reviewed in EPIC    Reviewed and updated as needed this visit by clinical staff       Reviewed and updated as needed this visit by Provider         ROS:   ROS: 10 point ROS neg other than the symptoms noted above in the HPI.      OBJECTIVE:                                                     /68 (BP Location: Right arm, Patient Position: Sitting, Cuff Size: Adult Large)  Pulse 74  Temp 98.5  F (36.9  C) (Tympanic)  Resp 20  Wt 234 lb (106.1 kg)  LMP 09/27/2009  SpO2 93%  BMI 48.91 kg/m2  Body mass index is 48.91 kg/(m^2).   GENERAL: healthy, alert, well nourished, well hydrated, no distress  HENT: ear canals- normal; TMs- normal; Nose- normal; Mouth- no ulcers, no lesions  NECK: no tenderness, no adenopathy, no asymmetry, no masses, no stiffness; thyroid- normal to palpation  RESP: lungs clear to auscultation - no rales, no rhonchi, no wheezes  CV: regular rates and rhythm, normal S1 S2, no S3 or S4 and no murmur, no click or rub -  ABDOMEN: soft, no tenderness, no  hepatosplenomegaly, no masses, normal bowel sounds  MS: extremities- no gross deformities noted, mild left knee edema limited flexion and extension due to pain.  Pedal pulses 2+ no calf pain or swelling.     Diagnostic test results:  Results for orders placed or performed in visit on 09/06/18   *UA reflex to Microscopic and Culture ([place] [facility] (except [place] and [place])   Result Value Ref Range    Color Urine Yellow     Appearance Urine Clear     Glucose Urine Negative NEG^Negative mg/dL    Bilirubin Urine Negative NEG^Negative    Ketones Urine Negative NEG^Negative mg/dL    Specific Gravity Urine 1.010 1.003 - 1.035    Blood Urine Negative NEG^Negative    pH Urine 6.5 5.0 - 7.0 pH    Protein Albumin Urine Negative NEG^Negative mg/dL    Urobilinogen Urine 0.2 0.2 - 1.0 EU/dL    Nitrite Urine Negative NEG^Negative    Leukocyte Esterase Urine Negative NEG^Negative    Source Midstream Urine      *Note: Due to a large number of results and/or encounters for the requested time period, some results have not been displayed. A complete set of results can be found in Results Review.        ASSESSMENT/PLAN:                                                    1. Dysuria   no  evidence of ongoing UTI.     - *UA reflex to Microscopic and Culture ([place] and [facility] (except [place] and [place])    2. Need for prophylactic vaccination and inoculation against influenza  done  - FLU VACCINE, (RIV4) RECOMBINANT HA  , IM (FluBlok, egg free) [69860]- >18 YRS ([facility] recommended  50-64 YRS)  - Vaccine Administration, Initial [38777]    3. Left Knee Pain  MRI no concerns.   Physical Therapy recommended  Ice elevate. Topical agents  Tylenol ES up to 3500 mg daily as needed.          Follow up with Provider - hematology and Primary Care Provider as previous discussed.   Call or return to the clinic with any worsening of symptoms or no resolution. Patient/Parent verbalized understanding and is in agreement. Medication side effects reviewed.   Current Outpatient Prescriptions   Medication Sig Dispense Refill     acetaminophen (TYLENOL) 500 MG tablet Take 500-1,000 mg by mouth every 6 hours as needed for mild pain       albuterol (2.5 MG/3ML) 0.083% neb solution Take 1 vial (2.5 mg) by nebulization every 6 hours as needed for shortness of breath / dyspnea or wheezing 1 Box 0     albuterol (PROAIR HFA/PROVENTIL HFA/VENTOLIN HFA) 108 (90 BASE) MCG/ACT Inhaler Inhale 2 puffs into the lungs every 6 hours as needed for shortness of breath / dyspnea or wheezing 3 Inhaler 1     buPROPion (WELLBUTRIN XL) 150 MG 24 hr tablet TAKE  ONE TABLET BY MOUTH EVERY DAY IN THE MORNING 30 tablet 0     cholecalciferol (VITAMIN D3) 1000 UNIT tablet Take 1,000 Units by mouth daily       EPINEPHrine (EPIPEN/ADRENACLICK/OR ANY BX GENERIC EQUIV) 0.3 MG/0.3ML injection 2-pack Inject 0.3 mLs (0.3 mg) into the muscle once as needed for anaphylaxis (Patient not taking: Reported on 9/12/2018) 0.6 mL 0     furosemide (LASIX) 20 MG tablet TAKE TWO TABLETS BY MOUTH TWICE DAILY 180 tablet 2     gabapentin (NEURONTIN) 300 MG capsule ONE CAPSULE BY MOUTH AT BEDTIME WITH 600 MG CAPSULE- FOR TOTAL  MG AT BEDTIME 90 capsule 2  "    ipratropium - albuterol 0.5 mg/2.5 mg/3 mL (DUONEB) 0.5-2.5 (3) MG/3ML neb solution Take 1 vial (3 mLs) by nebulization every 6 hours as needed for shortness of breath / dyspnea or wheezing 30 vial 1     levothyroxine (SYNTHROID/LEVOTHROID) 150 MCG tablet Take 1 tablet (150 mcg) by mouth daily 90 tablet 2     lidocaine (XYLOCAINE) 5 % ointment Apply topically 3 times daily as needed for moderate pain 240 g 1     mometasone-formoterol (DULERA) 200-5 MCG/ACT oral inhaler Inhale 2 puffs into the lungs 2 times daily 13 g 1     omeprazole (PRILOSEC) 20 MG CR capsule TAKE ONE CAPSULE BY MOUTH TWICE DAILY 180 capsule 3     order for DME Equipment being ordered:x2 [employer] 30/40mmHg compression wraps with x2 extra prs of compression liners 1 each 0     order for DME Equipment being ordered: Nebulizer 1 Device 0     order for DME Equipment being ordered: CPAP  AIRSENSE 10  5-18 CM H20  # [id number]   DN# [id number]       order for DME Equipment being ordered: [id number]\" x2pair     \"20-30mmHg [place] Hybrid Liners\" x 2 pair 2 Units 11     order for DME Equipment being ordered: DEPENDS SIZE LARGE 60 Month 5     order for DME Equipment being ordered: INCONTINENCE PADS   QID 1 Month 11     pramipexole (MIRAPEX) 0.125 MG tablet TAKE 1-2 TABLETS BY MOUTH AT BEDTIME 60 tablet 10     simvastatin (ZOCOR) 20 MG tablet TAKE ONE TABLET BY MOUTH AT BEDTIME 90 tablet 1     traZODone (DESYREL) 150 MG tablet TAKE ONE TABLET BY MOUTH NIGHTLY AT BEDTIME 30 tablet 0     warfarin (COUMADIN) 5 MG tablet Take 10 mg on Mon, Wed, Fri; 12.5 mg all other days or as directed by the Anticoagulation Clinic  180 tablet 2     allopurinol (ZYLOPRIM) 100 MG tablet TAKE ONE TABLET BY MOUTH ONE TIME DAILY 30 tablet 0     ARIPiprazole (ABILIFY) 10 MG tablet Take 1 tablet (10 mg) by mouth daily 90 tablet 1     gabapentin (NEURONTIN) 600 MG tablet TAKE ONE TABLET BY MOUTH THREE TIMES DAILY 90 tablet 0     lamoTRIgine (LAMICTAL) 200 MG tablet TAKE ONE TABLET " BY MOUTH ONE TIME DAILY 30 tablet 1     potassium chloride SA (K-DUR/KLOR-CON M) 10 MEQ CR tablet TAKE ONE TABLET BY MOUTH ONE TIME DAILY 90 tablet 3        See Patient Instructions    [facility]    Injectable Influenza Immunization Documentation    1.  Is the person to be vaccinated sick today?   No    2. Does the person to be vaccinated have an allergy to a component   of the vaccine?   No  Egg Allergy Algorithm Link    3. Has the person to be vaccinated ever had a serious reaction   to influenza vaccine in the past?   No    4. Has the person to be vaccinated ever had Guillain-Barré syndrome?   No    Form completed by Harper Morris

## 2018-09-06 NOTE — NURSING NOTE
"Chief Complaint   Patient presents with     Knee Pain     F/U MRI       Initial /68 (BP Location: Right arm, Patient Position: Sitting, Cuff Size: Adult Large)  Pulse 74  Temp 98.5  F (36.9  C) (Tympanic)  Resp 20  Wt 234 lb (106.1 kg)  LMP 09/27/2009  SpO2 93%  BMI 48.91 kg/m2 Estimated body mass index is 48.91 kg/(m^2) as calculated from the following:    Height as of 8/22/18: 4' 10\" (1.473 m).    Weight as of this encounter: 234 lb (106.1 kg).      Health Maintenance that is potentially due pending provider review:  Influenza shot    n/a    Is there anyone who you would like to be able to receive your results? No  If yes have patient fill out LAKSHMI    Harper Morris MA Student      "

## 2018-09-11 NOTE — PROGRESS NOTES
Progress Note  Disclaimer: This note consists of symbols derived from keyboarding, dictation and/or voice recognition software. As a result, there may be errors in the script that have gone undetected. Please consider this when interpreting information found in this chart.    Client Name: Bia Bill  Date: 9/4/2018         Service Type: Individual      Session Start Time: 8:30 AM  Session End Time: 9:15 AM      Session Length: 45     Session #: 35     Attendees: Client attended alone    Treatment Plan Last Reviewed: 4/3/2018  PHQ-9 / CHIN-7 : See flowsheet     DATA   Client reports feeling up and down.  She is holding her ground with boundaries with other people in her life but continues to find it very frustrating.  Ongoing physical problems are limiting her mobility.   Progress Since Last Session (Related to Symptoms / Goals / Homework):   Symptoms: Stable    Homework: Achieved / completed to satisfaction      Episode of Care Goals: Satisfactory progress - ACTION (Actively working towards change); Intervened by reinforcing change plan / affirming steps taken     Current / Ongoing Stressors and Concerns:   Recurrent depression  family relational problems, medical problems, chronic pain, trauma history      Treatment Objective(s) Addressed in This Session:   Increase interest, engagement, and pleasure in doing things  Decrease frequency and intensity of feeling down, depressed, hopeless       Intervention:   CBT: Behavioral activation supported client's effort at establishing maintaining healthy boundaries with her family.  ASSESSMENT: Current Emotional / Mental Status (status of significant symptoms):   Risk status (Self / Other harm or suicidal ideation)   Client denies current fears or concerns for personal safety.   Client denies current or recent suicidal ideation or behaviors.   Client denies current or recent homicidal ideation or behaviors.   Client denies  current or recent self injurious behavior or ideation.   Client denies other safety concerns.   A safety and risk management plan has not been developed at this time, however client was given the after-hours number / 911 should there be a change in any of these risk factors.     Appearance:   Appropriate    Eye Contact:   Good    Psychomotor Behavior: Normal    Attitude:   Cooperative    Orientation:   All   Speech    Rate / Production: Normal     Volume:  Normal    Mood:    Normal   Affect:    Appropriate    Thought Content:  Clear    Thought Form:  Coherent  Logical    Insight:    Good      Medication Review:   No changes to current psychiatric medication(s)     Medication Compliance:   Yes     Changes in Health Issues:   None reported     Chemical Use Review:   Substance Use: Chemical use reviewed, no active concerns identified      Tobacco Use: Client reports no smoking for the last week     Collateral Reports Completed:   Not Applicable    PLAN: (Client Tasks / Therapist Tasks / Other)  Client to continue With her self-care routine and maintenance of boundaries.   Maintain medication compliance and contact with psychiatry.  Take care of herself physically.  Practice one to 2 grounding techniques each day.   Client to maintain sobriety and contact with supportive sober others.  Kd Morris                                                         ________________________________________________________________________    Treatment Plan    Client's Name: Bia Bill  YOB: 1965    Date: 10/24/2017      DSM5 Diagnoses: (Sustained by DSM5 Criteria Listed Above)  Diagnoses: 296.40 Bipolar I Disorder Current or Most Recent Episode Manic, Unspecified  Psychosocial & Contextual Factors: financial difficulties, chronic pain, roommate issues  WHODAS 2.0 (12 item)               This questionnaire asks about difficulties due to health conditions. Health conditions             include disease or  illnesses, other health problems that may be short or long lasting,             injuries, mental health or emotional problems, and problems with alcohol or drugs.                         Think back over the past 30 days and answer these questions, thinking about how much             difficulty you had doing the following activities. For each question, please [place] only             one response.      S1  Standing for long periods such as 30 minutes?  Mild =           2    S2  Taking care of household responsibilities?  Moderate =   3    S3  Learning a new task, for example, learning how to get to a new place?  Mild =           2    S4  How much of a problem do you have joining community activities (for example, festivals, [employer]ab[employer] or other activities) in the same way as anyone else can?  Moderate =   3    S5  How much have you been emotionally affected by your health problems?  Mild =           2        In the past 30 days, how much difficulty did you have in:    S6  Concentrating on doing something for ten minutes?  Mild =           2    S7  Walking a long distance such as a kilometer (or equivalent)?  Severe =       4    S8  Washing your whole body?  None =         1    S9  Getting dressed?  None =         1    S10  Dealing with people you do not know?  Moderate =   3    S11  Maintaining a friendship?  Severe =       4    S12  Your day to day work?  Moderate =   3       H1  Overall, in the past 30 days, how many days were these difficulties present?  Record number of days seven    H2  In the past 30 days, for how many days were you totally unable to carry out your usual activities or work because of any health condition?  Record number of days  seven    H3  In the past 30 days, not counting the days that you were totally unable, for how many days did you cut back or reduce your usual activities or work because of any health condition?  Record number of days five                   Referral /  Collaboration:  Referral to another professional/service is not indicated at this time..    Anticipated number of session or this episode of care: 15    Goals  1. Education- the Biopsychosocial model of depression  a. Client will be able to describe how depression is effecting them physically, emotionally and socially  2. Behavioral Activation  a. Client will learn to assess their depression on a day to day basis  b. Client will Identify two forms of exercise/activity and engage in them 3 times per week  c. Client will Identify 3 things that make them laugh, and engage in these 5 times per week.  d. Client will Identify 1-2Creative activities or hobbies  and engage in them 2 times per week  e. Client will identify music, movies, books that make them feel good and use them 3-4 times per week  3. Self-care  a. Client will identify 5 things they can do just for themselves  b. Client will take time for quiet, reflection, meditation 5 times per week  c. Client will Learn to set boundaries when appropriate  d. Client will Identify 2 individuals they can call on for support, distraction  4. Assessment of progress  a. Client will engage in assessment of their progress on a regular basis    Bipolar Disorder  Treatment plan:  1. Education- the Biopsychosocial model of Bipolar Disorder    a. Client will be able to describe in general terms what Bipolar Disorder  is and is not  b. Client will be able to describe how Bipolar Disorder  has affected their life in at least two different areas, such as school or work and Home/relationships  c. Clients parents/guardians or significant others will be provided information on what Bipolar Disorder  is and the ways it can affect relationships and be encouraged to be a part of clients treatment team.  2. Medication  a. Client will participate in medication evaluation for Bipolar Disorder  symptoms and follow medication recommendations.   3. Identification and management of  triggers  a. Client and therapist will examine patient s history to determine if there are predictable triggers for manic or depressive episodes (e.g. boredom, anger, family stress)  b. Client and therapist will develop means of diffusing these triggers (e.g. relaxation strategies, boundary setting, anger management)  4. Comorbid conditions   a. Client and therapist will asses for comorbid conditions (e.g. anxiety, depression, substance use). and add additional items to treatment plan as needed  5. Self-care  a. Client will identify 3 things they can do just for themselves  b. Client will take time for quiet, reflection, meditation 3 times per week  c. Client will Learn to set boundaries when appropriate  d. Client will Identify 2 individuals they can call on for support, distraction  5. Behavioral Activation  a. Client will Identify two forms of exercise/activity and engage in them 3 times per week  b. Client will Identify 2 things that make them laugh, and engage in these 3 times per week.  c. Client will Identify 2 Creative activities or hobbies  and engage in them 2 times per week  d. Client will identify music, movies, books that make them feel good and use them 3 times per week  6. Assessment of progress  a. Client will engage in assessment of their progress on a regular basis    Client has reviewed and agreed to the above plan.      Kd Morris  4/3/2018

## 2018-09-12 ENCOUNTER — OFFICE VISIT (OUTPATIENT)
Dept: FAMILY MEDICINE | Facility: CLINIC | Age: 53
End: 2018-09-12
Payer: COMMERCIAL

## 2018-09-12 ENCOUNTER — RADIANT APPOINTMENT (OUTPATIENT)
Dept: GENERAL RADIOLOGY | Facility: CLINIC | Age: 53
End: 2018-09-12
Attending: FAMILY MEDICINE
Payer: COMMERCIAL

## 2018-09-12 VITALS
HEART RATE: 70 BPM | TEMPERATURE: 97.1 F | DIASTOLIC BLOOD PRESSURE: 63 MMHG | HEIGHT: 58 IN | OXYGEN SATURATION: 95 % | BODY MASS INDEX: 48.87 KG/M2 | RESPIRATION RATE: 18 BRPM | SYSTOLIC BLOOD PRESSURE: 134 MMHG | WEIGHT: 232.8 LBS

## 2018-09-12 DIAGNOSIS — M10.9 ACUTE GOUTY ARTHRITIS: ICD-10-CM

## 2018-09-12 DIAGNOSIS — S69.92XA HAND INJURY, LEFT, INITIAL ENCOUNTER: ICD-10-CM

## 2018-09-12 DIAGNOSIS — S69.92XA HAND INJURY, LEFT, INITIAL ENCOUNTER: Primary | ICD-10-CM

## 2018-09-12 DIAGNOSIS — F33.1 MAJOR DEPRESSIVE DISORDER, RECURRENT EPISODE, MODERATE (H): ICD-10-CM

## 2018-09-12 PROCEDURE — 73130 X-RAY EXAM OF HAND: CPT | Mod: LT

## 2018-09-12 PROCEDURE — 99214 OFFICE O/P EST MOD 30 MIN: CPT | Performed by: FAMILY MEDICINE

## 2018-09-12 RX ORDER — ARIPIPRAZOLE 10 MG/1
10 TABLET ORAL DAILY
Qty: 90 TABLET | Refills: 1 | Status: SHIPPED | OUTPATIENT
Start: 2018-09-12 | End: 2019-02-26

## 2018-09-12 ASSESSMENT — PAIN SCALES - GENERAL: PAINLEVEL: EXTREME PAIN (8)

## 2018-09-12 NOTE — NURSING NOTE
Chief Complaint   Patient presents with     Follow Up For     MRI     Hand Injury     Yesterday 9/11/18, patient punched a wall- left hand pain middle knuckle pain.     Denise LUGO CMA

## 2018-09-12 NOTE — TELEPHONE ENCOUNTER
ALLOPURINOL 100 MG TAB NORT        Last Written Prescription Date:  7/11/18  Last Fill Quantity: 30,   # refills: 1  Last Office Visit: 9/12/18  Future Office visit:    Next 5 appointments (look out 90 days)     Sep 21, 2018 12:00 PM CDT   Return Visit with Kd Morris   [facility] ([facility])    [address] [facility] [address]   [phone]            Sep 28, 2018  1:30 PM CDT   Return Visit with Joshua Watts MD   [facility] ([facility])    [facility]  [address] [address]   [phone]            Oct 03, 2018  1:20 PM CDT   SHORT with Kd Leong MD   [facility] ([facility])    [address] [place] [address]   [phone]            Oct 05, 2018  9:30 AM CDT   Return Visit with Kd Morris   [facility] ([facility])    [address] [address]   [phone]                   Requested Prescriptions   Pending Prescriptions Disp Refills     allopurinol (ZYLOPRIM) 100 MG tablet [Pharmacy Med Name: ALLOPURINOL 100 MG  TAB NORT] 30 tablet 0     Sig: TAKE ONE TABLET BY MOUTH ONE TIME DAILY    Gout Agents Protocol Failed    9/12/2018  3:22 PM       Failed - Has Uric Acid on file in past 12 months and value is less than 6    Recent Labs   Lab Test  11/30/15   1508   URIC  4.5     If level is 6mg/dL or greater, ok to refill one time and refer to provider.          Passed - CBC on file in past 12 months    Recent Labs   Lab Test  09/04/18   1040  08/25/18   2242   WBC   --   6.4   RBC   --   6.71*   HGB  18.9*  18.7*   HCT  59.6*  59.5*   PLT   --   125*       For [facility] ONLY: [id number] = WBC, [id number] = RBC         Passed - ALT on file in past 12 months    Recent Labs   Lab Test  05/16/18   1043   ALT  34            Passed - Recent (12 mo) or future (30 days) visit within the authorizing provider's specialty     "Patient had office visit in the last 12 months or has a visit in the next 30 days with authorizing provider or within the authorizing provider's specialty.  See \"Patient Info\" tab in inbasket, or \"Choose Columns\" in Meds & Orders section of the refill encounter.           Passed - Patient is age 18 or older       Passed - No active pregnancy on record       Passed - Normal serum creatinine on file in the past 12 months    Recent Labs   Lab Test  08/25/18   2242   12/12/11   1911   CR  0.77   < >   --    CREAT   --    --   0.7    < > = values in this interval not displayed.            Passed - No positive pregnancy test in past year          "

## 2018-09-12 NOTE — PROGRESS NOTES
"SUBJECTIVE:                                                    Bia Bill is a 52 year old female who presents to clinic today for the following health issues:  Chief Complaint   Patient presents with     Follow Up For     MRI     Hand Injury     Yesterday 9/11/18, patient punched a wall- left hand pain middle knuckle pain.       Musculoskeletal problem/pain      Duration: Started yesterday     Description  Location: left hand middle knuckle    Intensity:  Severe 8/10    Accompanying signs and symptoms: tingling and swelling    History  Previous similar problem: no   Previous evaluation:  none    Precipitating or alleviating factors:  Trauma or overuse: YES- Patient punched a wall  Aggravating factors include: any movement    Therapies tried and outcome: nothing      Problem list and histories reviewed & adjusted, as indicated.  Additional history:     ROS:  Constitutional, HEENT, cardiovascular, pulmonary, gi and gu systems are negative, except as otherwise noted.    OBJECTIVE:                                                    /63  Pulse 70  Temp 97.1  F (36.2  C) (Tympanic)  Resp 18  Ht 4' 10\" (1.473 m)  Wt 232 lb 12.8 oz (105.6 kg)  LMP 09/27/2009  SpO2 95%  BMI 48.66 kg/m2 Body mass index is 48.66 kg/(m^2).     GENERAL: healthy, alert, well nourished, well hydrated, no distress  HENT: ear canals- normal; TMs- normal; Nose- normal; Mouth- no ulcers, no lesions  NECK: no tenderness, no adenopathy, no asymmetry, no masses, no stiffness; thyroid- normal to palpation  RESP: lungs clear to auscultation - no rales, no rhonchi, no wheezes  CV: regular rates and rhythm, normal S1 S2, no S3 or S4 and no murmur, no click or rub -  ABDOMEN: soft, no tenderness, no  hepatosplenomegaly, no masses, normal bowel sounds  Hand  No snuff box tenderness     ASSESSMENT/PLAN:                                                      (S69.92XA) Hand injury, left, initial encounter  (primary encounter " diagnosis)  Comment: I independently visualized the xray:  No frx  Plan: XR Hand Left G/E 3 Views            (F33.1) Major depressive disorder, recurrent episode, moderate (H)  Comment: with mood swing larisa ncrease abilify and recheck in 3 weeks  Plan: ARIPiprazole (ABILIFY) 10 MG tablet          [facility]

## 2018-09-12 NOTE — MR AVS SNAPSHOT
After Visit Summary   9/12/2018    Bia Bill    MRN: [id number]           Patient Information     Date Of Birth          1965        Visit Information        Provider Department      9/12/2018 1:20 PM Kd Leong MD [facility]        Today's Diagnoses     Hand injury, left, initial encounter    -  1    Major depressive disorder, recurrent episode, moderate (H)           Follow-ups after your visit        Your next 10 appointments already scheduled     Sep 21, 2018 12:00 PM CDT   Return Visit with Kd Morris   [facility]    [address] [facility] [address]   [phone]            Sep 21, 2018  1:00 PM CDT   LAB with [facility] ([facility]    [address] [facility] [address]   [phone]           Please do not eat 10-12 hours before your appointment if you are coming in fasting for labs on lipids, cholesterol, or glucose (sugar). This does not apply to pregnant women. Water, hot tea and black coffee (with nothing added) are okay. Do not drink other fluids, diet soda or chew gum.            Sep 28, 2018  1:30 PM CDT   Return Visit with Joshua Watts MD   [facility] ([facility])    [facility]  [address] [address]   [phone]            Oct 03, 2018  1:20 PM CDT   SHORT with Kd Leong MD   [facility] ([facility])    [address] [address]   [phone]            Oct 05, 2018  9:30 AM CDT   Return Visit with Kd Morris   [facility] ([facility])    [address] [facility] [address]   [phone]              Who to contact     Normal or non-critical lab and imaging results will be communicated to you by MyChart, letter or phone within 4 business days after the clinic has received the results.  "If you do not hear from us within 7 days, please contact the clinic through [employer] or phone. If you have a critical or abnormal lab result, we will notify you by phone as soon as possible.  Submit refill requests through [employer] or call your pharmacy and they will forward the refill request to us. Please allow 3 business days for your refill to be completed.          If you need to speak with a  for additional information , please call: [phone]             Additional Information About Your Visit        [employer] Information     [employer] lets you send messages to your doctor, view your test results, renew your prescriptions, schedule appointments and more. To sign up, go to [email or web address] . Click on \"Log in\" on the left side of the screen, which will take you to the Welcome page. Then click on \"Sign up Now\" on the right side of the page.     You will be asked to enter the access code listed below, as well as some personal information. Please follow the directions to create your username and password.     Your access code is: [email or web address]  Expires: 12/10/2018 10:27 AM     Your access code will  in 90 days. If you need help or a new code, please call your [place] clinic or [phone].        Care EveryWhere ID     This is your Care EveryWhere ID. This could be used by other organizations to access your [place] medical records  [id number]        Your Vitals Were     Pulse Temperature Respirations Height Last Period Pulse Oximetry    70 97.1  F (36.2  C) (Tympanic) 18 4' 10\" (1.473 m) 2009 95%    BMI (Body Mass Index)                   48.66 kg/m2            Blood Pressure from Last 3 Encounters:   18 134/63   18 110/68   18 151/76    Weight from Last 3 Encounters:   18 232 lb 12.8 oz (105.6 kg)   18 234 lb (106.1 kg)   18 234 lb (106.1 kg)                 Today's Medication Changes          These changes are accurate as of 18  4:28 " PM.  If you have any questions, ask your nurse or doctor.               These medicines have changed or have updated prescriptions.        Dose/Directions    ARIPiprazole 10 MG tablet   Commonly known as:  ABILIFY   This may have changed:    - medication strength  - See the new instructions.   Used for:  Major depressive disorder, recurrent episode, moderate (H)   Changed by:  Kd Leong MD        Dose:  10 mg   Take 1 tablet (10 mg) by mouth daily   Quantity:  90 tablet   Refills:  1            Where to get your medicines      These medications were sent to [facility] [address] [address]     Phone:  [phone]     ARIPiprazole 10 MG tablet                Primary Care Provider Office Phone # Fax #    Kd Leong [phone] [address] [facility] [address]        Equal Access to Services     [place] campusASUNCION : Hadii jose zamora hadasho Sojossy, waaxda luqadaha, qaybta kaalmada adeegyada, nitin roberts . So [facility] [phone].    ATENCIÓN: Si habla español, tiene a terry disposición servicios gratuitos de asistencia lingüística. [facility] [phone].    We comply with applicable federal civil rights laws and [place] laws. We do not discriminate on the basis of race, color, national origin, age, disability, sex, sexual orientation, or gender identity.            Thank you!     Thank you for choosing [facility]  for your care. Our goal is always to provide you with excellent care. Hearing back from our patients is one way we can continue to improve our services. Please take a few minutes to complete the written survey that you may receive in the mail after your visit with us. Thank you!             Your Updated Medication List - Protect others around you: Learn how to safely use, store and throw away your medicines at www.disposemymeds.org.          This list is accurate  as of 9/12/18  4:28 PM.  Always use your most recent med list.                   Brand Name Dispense Instructions for use Diagnosis    acetaminophen 500 MG tablet    TYLENOL     Take 500-1,000 mg by mouth every 6 hours as needed for mild pain        * albuterol 108 (90 Base) MCG/ACT inhaler    PROAIR HFA/PROVENTIL HFA/VENTOLIN HFA    3 Inhaler    Inhale 2 puffs into the lungs every 6 hours as needed for shortness of breath / dyspnea or wheezing    Mild persistent asthma without complication       * albuterol (2.5 MG/3ML) 0.083% neb solution     1 Box    Take 1 vial (2.5 mg) by nebulization every 6 hours as needed for shortness of breath / dyspnea or wheezing    Mild persistent asthma without complication       allopurinol 100 MG tablet    ZYLOPRIM    30 tablet    TAKE ONE TABLET BY MOUTH ONE TIME DAILY    Acute gouty arthritis       ARIPiprazole 10 MG tablet    ABILIFY    90 tablet    Take 1 tablet (10 mg) by mouth daily    Major depressive disorder, recurrent episode, moderate (H)       buPROPion 150 MG 24 hr tablet    WELLBUTRIN XL    30 tablet    TAKE  ONE TABLET BY MOUTH EVERY DAY IN THE MORNING    COPD exacerbation (H)       cholecalciferol 1000 UNIT tablet    vitamin D3     Take 1,000 Units by mouth daily        EPINEPHrine 0.3 MG/0.3ML injection 2-pack    EPIPEN/ADRENACLICK/or ANY BX GENERIC EQUIV    0.6 mL    Inject 0.3 mLs (0.3 mg) into the muscle once as needed for anaphylaxis    Anaphylactic reaction to bee sting, undetermined intent, subsequent encounter       furosemide 20 MG tablet    LASIX    180 tablet    TAKE TWO TABLETS BY MOUTH TWICE DAILY    Lymphedema of both lower extremities       * gabapentin 300 MG capsule    NEURONTIN    90 capsule    ONE CAPSULE BY MOUTH AT BEDTIME WITH 600 MG CAPSULE- FOR TOTAL  MG AT BEDTIME    Polyneuropathy in other diseases classified elsewhere (H)       * gabapentin 600 MG tablet    NEURONTIN    90 tablet    TAKE ONE TABLET BY MOUTH THREE TIMES DAILY     "Polyneuropathy in other diseases classified elsewhere (H)       ipratropium - albuterol 0.5 mg/2.5 mg/3 mL 0.5-2.5 (3) MG/3ML neb solution    DUONEB    30 vial    Take 1 vial (3 mLs) by nebulization every 6 hours as needed for shortness of breath / dyspnea or wheezing    Chronic obstructive pulmonary disease, unspecified COPD type (H)       lamoTRIgine 200 MG tablet    LaMICtal    30 tablet    TAKE 1 TABLET (200 MG) BY MOUTH DAILY    Major depressive disorder, recurrent episode, moderate (H)       levothyroxine 150 MCG tablet    SYNTHROID/LEVOTHROID    90 tablet    Take 1 tablet (150 mcg) by mouth daily    Hypothyroidism, unspecified type       lidocaine 5 % ointment    XYLOCAINE    240 g    Apply topically 3 times daily as needed for moderate pain    Acute pain of left knee       mometasone-formoterol 200-5 MCG/ACT oral inhaler    DULERA    13 g    Inhale 2 puffs into the lungs 2 times daily    COPD exacerbation (H), Chronic obstructive pulmonary disease, unspecified COPD type (H)       omeprazole 20 MG CR capsule    priLOSEC    180 capsule    TAKE ONE CAPSULE BY MOUTH TWICE DAILY    Gastroesophageal reflux disease without esophagitis       * order for DME      Equipment being ordered: CPAP AIRSENSE 10 5-18 CM H20 SN# [id number]   DN# [id number]        * order for DME     1 Month    Equipment being ordered: INCONTINENCE PADS  QID    Other urinary incontinence       * order for DME     60 Month    Equipment being ordered: DEPENDS SIZE LARGE    Mixed incontinence       * order for DME     2 Units    Equipment being ordered: [id number]\" x2pair   \"20-30mmHg Farrow Hybrid Liners\" x 2 pair    Peripheral edema       order for DME     1 Device    Equipment being ordered: Nebulizer    COPD exacerbation (H), Chronic obstructive pulmonary disease, unspecified COPD type (H)       order for DME     1 each    Equipment being ordered:x2 [employer] 30/40mmHg compression wraps with x2 extra prs of compression liners    Secondary " lymphedema       potassium chloride SA 10 MEQ CR tablet    K-DUR/KLOR-CON M    30 tablet    TAKE ONE TABLET BY MOUTH ONE TIME DAILY    Lymphedema       pramipexole 0.125 MG tablet    MIRAPEX    60 tablet    TAKE 1-2 TABLETS BY MOUTH AT BEDTIME    Restless leg syndrome       simvastatin 20 MG tablet    ZOCOR    90 tablet    TAKE ONE TABLET BY MOUTH AT BEDTIME    Hypercholesteremia       traZODone 150 MG tablet    DESYREL    30 tablet    TAKE ONE TABLET BY MOUTH NIGHTLY AT BEDTIME    Major depressive disorder, recurrent episode, moderate (H)       warfarin 5 MG tablet    COUMADIN    180 tablet    Take 10 mg on Mon, Wed, Fri; 12.5 mg all other days or as directed by the Anticoagulation Clinic    DVT (deep venous thrombosis) (H)       * Notice:  This list has 8 medication(s) that are the same as other medications prescribed for you. Read the directions carefully, and ask your doctor or other care provider to review them with you.

## 2018-09-13 RX ORDER — ALLOPURINOL 100 MG/1
TABLET ORAL
Qty: 30 TABLET | Refills: 0 | Status: SHIPPED | OUTPATIENT
Start: 2018-09-13 | End: 2018-10-08

## 2018-09-17 DIAGNOSIS — F33.1 MAJOR DEPRESSIVE DISORDER, RECURRENT EPISODE, MODERATE (H): ICD-10-CM

## 2018-09-17 DIAGNOSIS — I89.0 LYMPHEDEMA: ICD-10-CM

## 2018-09-17 DIAGNOSIS — G63 POLYNEUROPATHY IN OTHER DISEASES CLASSIFIED ELSEWHERE (H): Chronic | ICD-10-CM

## 2018-09-17 RX ORDER — GABAPENTIN 600 MG/1
TABLET ORAL
Qty: 90 TABLET | Refills: 0 | Status: SHIPPED | OUTPATIENT
Start: 2018-09-17 | End: 2018-10-16

## 2018-09-17 RX ORDER — LAMOTRIGINE 200 MG/1
TABLET ORAL
Qty: 30 TABLET | Refills: 1 | Status: SHIPPED | OUTPATIENT
Start: 2018-09-17 | End: 2018-11-18

## 2018-09-17 RX ORDER — POTASSIUM CHLORIDE 750 MG/1
TABLET, EXTENDED RELEASE ORAL
Qty: 90 TABLET | Refills: 3 | Status: SHIPPED | OUTPATIENT
Start: 2018-09-17 | End: 2019-10-01

## 2018-09-17 NOTE — TELEPHONE ENCOUNTER
GABAPENTIN 600 MG TAB ASCE        Last Written Prescription Date:  8/19/18  Last Fill Quantity: 90,   # refills: 0  Last Office Visit: 9/12/18  Future Office visit:    Next 5 appointments (look out 90 days)     Sep 21, 2018 12:00 PM CDT   Return Visit with Kd Morris   [facility] ([facility]    [address] [facility] [address]   [phone]            Sep 28, 2018  1:30 PM CDT   Return Visit with Joshua Watts MD   [facility] ([facility])    [facility]  [address]  [facility] [address]   [phone]            Oct 03, 2018  1:20 PM CDT   SHORT with Kd Leong MD   [facility] ([facility])    [address] [facility] [address]   [phone]            Oct 05, 2018  9:30 AM CDT   Return Visit with Kd Morris   [facility] ([facility])    [address] [place] [address]   [phone]                   Routing refill request to provider for review/approval because:  Drug not on the [facility], UMP or  Health refill protocol or controlled substance    potassium chloride SA (K-DUR/KLOR-CON M) 10 MEQ CR tablet      Last Written Prescription Date:  8/19/18  Last Fill Quantity: 30,   # refills: 0  Last Office Visit: 9/12/18  Future Office visit:    Next 5 appointments (look out 90 days)     Sep 21, 2018 12:00 PM CDT   Return Visit with Kd Morris   [facility] ([facility])    [address] [facility] [address]   [phone]            Sep 28, 2018  1:30 PM CDT   Return Visit with Joshua Watts MD   [facility] ([facility])    [facility]  [address]  [facility] [address]   [phone]            Oct 03, 2018  1:20 PM CDT   SHORT with Kd Leong MD   [facility] ([facility]    [address]  [place]  [place] [address]   [phone]            Oct 05, 2018  9:30 AM CDT   Return Visit with Kd Morris   [facility] ([facility])    [address] [facility] [address] [phone]                   Requested Prescriptions   Pending Prescriptions Disp Refills     gabapentin (NEURONTIN) 600 MG tablet [Pharmacy Med Name: GABAPENTIN 600 MG   TAB ASCE] 90 tablet 0     Sig: TAKE ONE TABLET BY MOUTH THREE TIMES DAILY    There is no refill protocol information for this order        potassium chloride SA (K-DUR/KLOR-CON M) 10 MEQ CR tablet [Pharmacy Med Name: POT CL MICRO 10 MEQ TAB SAND] 30 tablet 0     Sig: TAKE ONE TABLET BY MOUTH ONE TIME DAILY    Potassium Supplements Protocol Passed    9/17/2018 11:04 AM       Passed - Recent (12 mo) or future (30 days) visit within the authorizing provider's specialty    Patient had office visit in the last 12 months or has a visit in the next 30 days with authorizing provider or within the authorizing provider's specialty.  See \"Patient Info\" tab in inbasket, or \"Choose Columns\" in Meds & Orders section of the refill encounter.           Passed - Patient is age 18 or older       Passed - Normal serum potassium in past 12 months    Recent Labs   Lab Test  08/25/18   2242   POTASSIUM  3.5                      "

## 2018-09-17 NOTE — TELEPHONE ENCOUNTER
LAMOTRIGINE 200 MG TAB NORT        Last Written Prescription Date:  6/20/18  Last Fill Quantity: 30,   # refills: 2  Last Office Visit: 9/12/18  Future Office visit:    Next 5 appointments (look out 90 days)     Sep 21, 2018 12:00 PM CDT   Return Visit with Kd Morris   [facility] ([facility])    [address] [facility] [address]   [phone]            Sep 28, 2018  1:30 PM CDT   Return Visit with Joshua Watts MD   [facility] ([facility])    [facility]  [address] [facility] [address]  [facility] [address]   [phone]            Oct 03, 2018  1:20 PM CDT   SHORT with Kd Leong MD   [facility] ([facility])    [address] [address]   [phone]            Oct 05, 2018  9:30 AM CDT   Return Visit with Kd Morris   [facility] ([facility])    [address] [facility] [address]   [phone]                   Requested Prescriptions   Pending Prescriptions Disp Refills     lamoTRIgine (LAMICTAL) 200 MG tablet [Pharmacy Med Name: LAMOTRIGINE 200 MG  TAB NORT] 30 tablet 1     Sig: TAKE ONE TABLET BY MOUTH ONE TIME DAILY    Anti-Seizure Meds Protocol  Failed    9/17/2018 11:03 AM       Failed - Review Authorizing provider's last note.     Refer to last progress notes: confirm request is for original authorizing provider (cannot be through other providers).         Failed - Normal CBC on file in past 26 months    Recent Labs   Lab Test  09/04/18   1040  08/25/18   2242   WBC   --   6.4   RBC   --   6.71*   HGB  18.9*  18.7*   HCT  59.6*  59.5*   PLT   --   125*       For [facility] ONLY: [id number] = WBC, YCOE375 = RBC         Failed - Normal platelet count on file in past 26 months    Recent Labs   Lab Test  08/25/18   2242   PLT  125*              Passed - Recent (12 mo) or future (30 days) visit within the authorizing provider's specialty     "Patient had office visit in the last 12 months or has a visit in the next 30 days with authorizing provider or within the authorizing provider's specialty.  See \"Patient Info\" tab in inbasket, or \"Choose Columns\" in Meds & Orders section of the refill encounter.           Passed - Normal ALT or AST on file in past 26 months    Recent Labs   Lab Test  05/16/18   1043   ALT  34     Recent Labs   Lab Test  05/16/18   1043   AST  23            Passed - No active pregnancy on record       Passed - No positive pregnancy test in last 12 months          "

## 2018-09-21 ENCOUNTER — OFFICE VISIT (OUTPATIENT)
Dept: PSYCHOLOGY | Facility: CLINIC | Age: 53
End: 2018-09-21
Payer: COMMERCIAL

## 2018-09-21 DIAGNOSIS — F31.62 MODERATE MIXED BIPOLAR I DISORDER (H): Primary | ICD-10-CM

## 2018-09-21 PROCEDURE — 90834 PSYTX W PT 45 MINUTES: CPT | Performed by: PSYCHOLOGIST

## 2018-09-21 NOTE — MR AVS SNAPSHOT
"                  MRN:[id number]                      After Visit Summary   2018    Bia Bill    MRN: [id number]           Visit Information        Provider Department      2018 12:00 PM Kd Morris [facility] Generic      Your next 10 appointments already scheduled     Sep 28, 2018  1:30 PM CDT   Return Visit with Joshua Watts MD   [facility] ([facility])    [facility]  [address]  [facility] [place] [address]   [phone]            Oct 03, 2018  1:20 PM CDT   SHORT with Kd Leong MD   [facility] ([facility])    [address] [address]   [phone]            Oct 05, 2018  9:30 AM CDT   Return Visit with Kd Morris   [facility] ([facility])    [address] [place] [address]   [phone]            Oct 18, 2018  1:00 PM CDT   Return Visit with Kd Morris   [facility] ([facility])    [address] [place] [address]   [phone]              MyChart Information     ScaleArc lets you send messages to your doctor, view your test results, renew your prescriptions, schedule appointments and more. To sign up, go to [email or web address] . Click on \"Log in\" on the left side of the screen, which will take you to the Welcome page. Then click on \"Sign up Now\" on the right side of the page.     You will be asked to enter the access code listed below, as well as some personal information. Please follow the directions to create your username and password.     Your access code is: [email or web address]  Expires: 12/10/2018 10:27 AM     Your access code will  in 90 days. If you need help or a new code, please call your [facility] clinic or [phone].        Care EveryWhere ID     This is your Care EveryWhere ID. This could be used by other organizations to access your [facility] " medical records  [id number]        Equal Access to Services     ANTIONETTE IRBY : Elfego Clark, waldemar rust, emma clay, nitin craig. Formerly [facility] [phone].    ATENCIÓN: Si habla español, tiene a terry disposición servicios gratuitos de asistencia lingüística. Llame al [phone].    We comply with applicable federal civil rights laws and [place] laws. We do not discriminate on the basis of race, color, national origin, age, disability, sex, sexual orientation, or gender identity.

## 2018-09-23 ASSESSMENT — ANXIETY QUESTIONNAIRES
GAD7 TOTAL SCORE: 11
7. FEELING AFRAID AS IF SOMETHING AWFUL MIGHT HAPPEN: NOT AT ALL
1. FEELING NERVOUS, ANXIOUS, OR ON EDGE: MORE THAN HALF THE DAYS
3. WORRYING TOO MUCH ABOUT DIFFERENT THINGS: SEVERAL DAYS
2. NOT BEING ABLE TO STOP OR CONTROL WORRYING: SEVERAL DAYS
6. BECOMING EASILY ANNOYED OR IRRITABLE: NEARLY EVERY DAY
5. BEING SO RESTLESS THAT IT IS HARD TO SIT STILL: MORE THAN HALF THE DAYS
4. TROUBLE RELAXING: MORE THAN HALF THE DAYS

## 2018-09-24 ASSESSMENT — ANXIETY QUESTIONNAIRES: GAD7 TOTAL SCORE: 11

## 2018-09-24 ASSESSMENT — PATIENT HEALTH QUESTIONNAIRE - PHQ9: SUM OF ALL RESPONSES TO PHQ QUESTIONS 1-9: 13

## 2018-09-24 NOTE — PROGRESS NOTES
Progress Note  Disclaimer: This note consists of symbols derived from keyboarding, dictation and/or voice recognition software. As a result, there may be errors in the script that have gone undetected. Please consider this when interpreting information found in this chart.    Client Name: Bia Bill  Date: 9/21/2018         Service Type: Individual      Session Start Time: 12:00 PM Session End Time: 12:15 PM    Session Length: 45     Session #: 36     Attendees: Client attended alone    Treatment Plan Last Reviewed: 4/3/2018  PHQ-9 / CHIN-7 : See flowsheet     DATA   Client reports she experienced another fall.  She is back on her feet and maintaining balance.  Reporting her irritability has been up, she is critically irritated with her former roommate who is living in his car in her driveway.  We discussed how she is enabling his behavior by allowing him to come in for showers and the occasional meal.  Due to increased irritability her PCP has increased her Abilify.  She is reporting no uncomfortable side effects.   Progress Since Last Session (Related to Symptoms / Goals / Homework):   Symptoms: Stable    Homework: Achieved / completed to satisfaction      Episode of Care Goals: Satisfactory progress - ACTION (Actively working towards change); Intervened by reinforcing change plan / affirming steps taken     Current / Ongoing Stressors and Concerns:   Recurrent depression  family relational problems, medical problems, chronic pain, trauma history      Treatment Objective(s) Addressed in This Session:   Increase interest, engagement, and pleasure in doing things  Decrease frequency and intensity of feeling down, depressed, hopeless       Intervention:   CBT: Behavioral activation supported client's effort at establishing maintaining healthy boundaries with her family.  Encouraged client to stop enabling individual's behavior.  ASSESSMENT: Current Emotional / Mental  Status (status of significant symptoms):   Risk status (Self / Other harm or suicidal ideation)   Client denies current fears or concerns for personal safety.   Client denies current or recent suicidal ideation or behaviors.   Client denies current or recent homicidal ideation or behaviors.   Client denies current or recent self injurious behavior or ideation.   Client denies other safety concerns.   A safety and risk management plan has not been developed at this time, however client was given the after-hours number / 911 should there be a change in any of these risk factors.     Appearance:   Appropriate    Eye Contact:   Good    Psychomotor Behavior: Normal    Attitude:   Cooperative    Orientation:   All   Speech    Rate / Production: Normal     Volume:  Normal    Mood:    Normal   Affect:    Appropriate    Thought Content:  Clear    Thought Form:  Coherent  Logical    Insight:    Good      Medication Review:   No changes to current psychiatric medication(s)     Medication Compliance:   Yes     Changes in Health Issues:   None reported     Chemical Use Review:   Substance Use: Chemical use reviewed, no active concerns identified      Tobacco Use: Client reports no smoking for the last week     Collateral Reports Completed:   Not Applicable    PLAN: (Client Tasks / Therapist Tasks / Other)  Client to continue With her self-care routine and maintenance of boundaries.   Maintain medication compliance and contact with psychiatry.  Take care of herself physically.  Practice one to 2 grounding techniques each day.   Client to maintain sobriety and contact with supportive sober others.  Kd Morris                                                         ________________________________________________________________________    Treatment Plan    Client's Name: Bia Bill  YOB: 1965    Date: 10/24/2017      DSM5 Diagnoses: (Sustained by DSM5 Criteria Listed Above)  Diagnoses: 296.40 Bipolar I  Disorder Current or Most Recent Episode Manic, Unspecified  Psychosocial & Contextual Factors: financial difficulties, chronic pain, roommate issues  WHODAS 2.0 (12 item)               This questionnaire asks about difficulties due to health conditions. Health conditions             include disease or illnesses, other health problems that may be short or long lasting,             injuries, mental health or emotional problems, and problems with alcohol or drugs.                         Think back over the past 30 days and answer these questions, thinking about how much             difficulty you had doing the following activities. For each question, please [place] only             one response.      S1  Standing for long periods such as 30 minutes?  Mild =           2    S2  Taking care of household responsibilities?  Moderate =   3    S3  Learning a new task, for example, learning how to get to a new place?  Mild =           2    S4  How much of a problem do you have joining community activities (for example, festivals, Latter-day or other activities) in the same way as anyone else can?  Moderate =   3    S5  How much have you been emotionally affected by your health problems?  Mild =           2        In the past 30 days, how much difficulty did you have in:    S6  Concentrating on doing something for ten minutes?  Mild =           2    S7  Walking a long distance such as a kilometer (or equivalent)?  Severe =       4    S8  Washing your whole body?  None =         1    S9  Getting dressed?  None =         1    S10  Dealing with people you do not know?  Moderate =   3    S11  Maintaining a friendship?  Severe =       4    S12  Your day to day work?  Moderate =   3       H1  Overall, in the past 30 days, how many days were these difficulties present?  Record number of days seven    H2  In the past 30 days, for how many days were you totally unable to carry out your usual activities or work because of any health  condition?  Record number of days  seven    H3  In the past 30 days, not counting the days that you were totally unable, for how many days did you cut back or reduce your usual activities or work because of any health condition?  Record number of days five                   Referral / Collaboration:  Referral to another professional/service is not indicated at this time..    Anticipated number of session or this episode of care: 15    Goals  1. Education- the Biopsychosocial model of depression  a. Client will be able to describe how depression is effecting them physically, emotionally and socially  2. Behavioral Activation  a. Client will learn to assess their depression on a day to day basis  b. Client will Identify two forms of exercise/activity and engage in them 3 times per week  c. Client will Identify 3 things that make them laugh, and engage in these 5 times per week.  d. Client will Identify 1-2Creative activities or hobbies  and engage in them 2 times per week  e. Client will identify music, movies, books that make them feel good and use them 3-4 times per week  3. Self-care  a. Client will identify 5 things they can do just for themselves  b. Client will take time for quiet, reflection, meditation 5 times per week  c. Client will Learn to set boundaries when appropriate  d. Client will Identify 2 individuals they can call on for support, distraction  4. Assessment of progress  a. Client will engage in assessment of their progress on a regular basis    Bipolar Disorder  Treatment plan:  1. Education- the Biopsychosocial model of Bipolar Disorder    a. Client will be able to describe in general terms what Bipolar Disorder  is and is not  b. Client will be able to describe how Bipolar Disorder  has affected their life in at least two different areas, such as school or work and Home/relationships  c. Clients parents/guardians or significant others will be provided information on what Bipolar Disorder  is and  the ways it can affect relationships and be encouraged to be a part of clients treatment team.  2. Medication  a. Client will participate in medication evaluation for Bipolar Disorder  symptoms and follow medication recommendations.   3. Identification and management of triggers  a. Client and therapist will examine patient s history to determine if there are predictable triggers for manic or depressive episodes (e.g. boredom, anger, family stress)  b. Client and therapist will develop means of diffusing these triggers (e.g. relaxation strategies, boundary setting, anger management)  4. Comorbid conditions   a. Client and therapist will asses for comorbid conditions (e.g. anxiety, depression, substance use). and add additional items to treatment plan as needed  5. Self-care  a. Client will identify 3 things they can do just for themselves  b. Client will take time for quiet, reflection, meditation 3 times per week  c. Client will Learn to set boundaries when appropriate  d. Client will Identify 2 individuals they can call on for support, distraction  5. Behavioral Activation  a. Client will Identify two forms of exercise/activity and engage in them 3 times per week  b. Client will Identify 2 things that make them laugh, and engage in these 3 times per week.  c. Client will Identify 2 Creative activities or hobbies  and engage in them 2 times per week  d. Client will identify music, movies, books that make them feel good and use them 3 times per week  6. Assessment of progress  a. Client will engage in assessment of their progress on a regular basis    Client has reviewed and agreed to the above plan.      Kd Morris  4/3/2018

## 2018-09-28 ENCOUNTER — ONCOLOGY VISIT (OUTPATIENT)
Dept: ONCOLOGY | Facility: CLINIC | Age: 53
End: 2018-09-28
Attending: INTERNAL MEDICINE
Payer: COMMERCIAL

## 2018-09-28 VITALS
SYSTOLIC BLOOD PRESSURE: 125 MMHG | HEIGHT: 58 IN | HEART RATE: 82 BPM | DIASTOLIC BLOOD PRESSURE: 52 MMHG | OXYGEN SATURATION: 95 % | TEMPERATURE: 97.8 F | WEIGHT: 230.6 LBS | RESPIRATION RATE: 18 BRPM | BODY MASS INDEX: 48.41 KG/M2

## 2018-09-28 DIAGNOSIS — E03.9 HYPOTHYROIDISM, UNSPECIFIED TYPE: Chronic | ICD-10-CM

## 2018-09-28 DIAGNOSIS — D75.1 POLYCYTHEMIA, SECONDARY: Chronic | ICD-10-CM

## 2018-09-28 DIAGNOSIS — J44.9 CHRONIC OBSTRUCTIVE PULMONARY DISEASE, UNSPECIFIED COPD TYPE (H): Primary | Chronic | ICD-10-CM

## 2018-09-28 DIAGNOSIS — I10 HTN, GOAL BELOW 140/90: ICD-10-CM

## 2018-09-28 DIAGNOSIS — F33.2 SEVERE EPISODE OF RECURRENT MAJOR DEPRESSIVE DISORDER, WITHOUT PSYCHOTIC FEATURES (H): Chronic | ICD-10-CM

## 2018-09-28 DIAGNOSIS — K21.9 GASTROESOPHAGEAL REFLUX DISEASE WITHOUT ESOPHAGITIS: Chronic | ICD-10-CM

## 2018-09-28 DIAGNOSIS — D75.1 ERYTHROCYTOSIS: Chronic | ICD-10-CM

## 2018-09-28 PROCEDURE — 99214 OFFICE O/P EST MOD 30 MIN: CPT | Performed by: INTERNAL MEDICINE

## 2018-09-28 PROCEDURE — G0463 HOSPITAL OUTPT CLINIC VISIT: HCPCS

## 2018-09-28 ASSESSMENT — PAIN SCALES - GENERAL: PAINLEVEL: EXTREME PAIN (8)

## 2018-09-28 NOTE — MR AVS SNAPSHOT
After Visit Summary   9/28/2018    Bia Bill    MRN: [id number]           Patient Information     Date Of Birth          1965        Visit Information        Provider Department      9/28/2018 1:30 PM Joshua Watts MD [facility] ONCOLOGY      Today's Diagnoses     Chronic obstructive pulmonary disease, unspecified COPD type (H)    -  1      Care Instructions        We would like to see you back in 6 months for a follow up appointment with labs prior.     When you are in need of a refill, please call your pharmacy and they will send us a request.      Copy of appointments, and after visit summary (AVS) given to patient.      If you have any questions please call Vanessa Torres RN, BSN Oncology Hematology  [facility] [phone]. For questions after business hours, or on holidays/weekends, please call our after hours Nurse Triage line [phone]. Thank you.             Follow-ups after your visit        Follow-up notes from your care team     Return in about 6 months (around 3/28/2019) for Blood work before next appointment.      Your next 10 appointments already scheduled     Oct 03, 2018  1:20 PM CDT   SHORT with Kd Leong MD   [facility]    [address] [facility] [address]   [phone]            Oct 05, 2018  9:30 AM CDT   Return Visit with Kd Morris   [facility] ([facility])    [address] [address]   [phone]            Oct 19, 2018  8:30 AM CDT   Return Visit with Kd Morris   [facility] ([facility])    [address] [address]   [phone]            Mar 29, 2019  1:10 PM CDT   LAB with Levine, Susan. \[facility].\"" Lab ([facility])    [address] [address]   [phone]           Please do not eat 10-12 hours  "before your appointment if you are coming in fasting for labs on lipids, cholesterol, or glucose (sugar). This does not apply to pregnant women. Water, hot tea and black coffee (with nothing added) are okay. Do not drink other fluids, diet soda or chew gum.            Mar 29, 2019  1:45 PM CDT   Return Visit with Joshua Watts MD   [facility] ([facility])    [facility]  [address]  [facility] [address]   [phone]              Future tests that were ordered for you today     Open Future Orders        Priority Expected Expires Ordered    CBC with platelets differential Routine 3/28/2019 9/28/2019 9/28/2018            Who to contact     If you have questions or need follow up information about today's clinic visit or your schedule please contact [facility] [place] directly at [phone].  Normal or non-critical lab and imaging results will be communicated to you by MyChart, letter or phone within 4 business days after the clinic has received the results. If you do not hear from us within 7 days, please contact the clinic through Heroichart or phone. If you have a critical or abnormal lab result, we will notify you by phone as soon as possible.  Submit refill requests through Moovly or call your pharmacy and they will forward the refill request to us. Please allow 3 business days for your refill to be completed.          Additional Information About Your Visit        Heroichart Information     Moovly lets you send messages to your doctor, view your test results, renew your prescriptions, schedule appointments and more. To sign up, go to [email or web address] . Click on \"Log in\" on the left side of the screen, which will take you to the Welcome page. Then click on \"Sign up Now\" on the right side of the page.     You will be asked to enter the access code listed below, as well as some personal information. Please follow the directions to create your " "username and password.     Your access code is: [email or web address]  Expires: 12/10/2018 10:27 AM     Your access code will  in 90 days. If you need help or a new code, please call your [facility] or [phone].        Care EveryWhere ID     This is your Care EveryWhere ID. This could be used by other organizations to access your [facility] medical records  [id number]        Your Vitals Were     Pulse Temperature Respirations Height Last Period Pulse Oximetry    82 97.8  F (36.6  C) (Tympanic) 18 1.473 m (4' 10\") 2009 95%    Breastfeeding? BMI (Body Mass Index)                No 48.2 kg/m2           Blood Pressure from Last 3 Encounters:   18 125/52   18 134/63   18 110/68    Weight from Last 3 Encounters:   18 104.6 kg (230 lb 9.6 oz)   18 105.6 kg (232 lb 12.8 oz)   18 106.1 kg (234 lb)               Primary Care Provider Office Phone # Fax #    Kd Leong [phone] [address] [place] [id number]        Equal Access to Services     DORIAN IRBY AH: Hadii jose ku hadasho Soomaali, waaxda luqadaha, qaybta kaalmada adeegyada, waxay clare roberts . So [facility] [phone].    ATENCIÓN: Si habla español, tiene a terry disposición servicios gratuitos de asistencia lingüística. owen al [phone].    We comply with applicable federal civil rights laws and [place] laws. We do not discriminate on the basis of race, color, national origin, age, disability, sex, sexual orientation, or gender identity.            Thank you!     Thank you for choosing [facility]  for your care. Our goal is always to provide you with excellent care. Hearing back from our patients is one way we can continue to improve our services. Please take a few minutes to complete the written survey that you may receive in the mail after your visit with us. Thank you!             Your Updated Medication List - Protect others around you: Learn how to " safely use, store and throw away your medicines at www.disposemymeds.org.          This list is accurate as of 9/28/18  1:56 PM.  Always use your most recent med list.                   Brand Name Dispense Instructions for use Diagnosis    acetaminophen 500 MG tablet    TYLENOL     Take 500-1,000 mg by mouth every 6 hours as needed for mild pain        * albuterol 108 (90 Base) MCG/ACT inhaler    PROAIR HFA/PROVENTIL HFA/VENTOLIN HFA    3 Inhaler    Inhale 2 puffs into the lungs every 6 hours as needed for shortness of breath / dyspnea or wheezing    Mild persistent asthma without complication       * albuterol (2.5 MG/3ML) 0.083% neb solution     1 Box    Take 1 vial (2.5 mg) by nebulization every 6 hours as needed for shortness of breath / dyspnea or wheezing    Mild persistent asthma without complication       allopurinol 100 MG tablet    ZYLOPRIM    30 tablet    TAKE ONE TABLET BY MOUTH ONE TIME DAILY    Acute gouty arthritis       ARIPiprazole 10 MG tablet    ABILIFY    90 tablet    Take 1 tablet (10 mg) by mouth daily    Major depressive disorder, recurrent episode, moderate (H)       buPROPion 150 MG 24 hr tablet    WELLBUTRIN XL    30 tablet    TAKE  ONE TABLET BY MOUTH EVERY DAY IN THE MORNING    COPD exacerbation (H)       cholecalciferol 1000 UNIT tablet    vitamin D3     Take 1,000 Units by mouth daily        EPINEPHrine 0.3 MG/0.3ML injection 2-pack    EPIPEN/ADRENACLICK/or ANY BX GENERIC EQUIV    0.6 mL    Inject 0.3 mLs (0.3 mg) into the muscle once as needed for anaphylaxis    Anaphylactic reaction to bee sting, undetermined intent, subsequent encounter       furosemide 20 MG tablet    LASIX    180 tablet    TAKE TWO TABLETS BY MOUTH TWICE DAILY    Lymphedema of both lower extremities       * gabapentin 300 MG capsule    NEURONTIN    90 capsule    ONE CAPSULE BY MOUTH AT BEDTIME WITH 600 MG CAPSULE- FOR TOTAL  MG AT BEDTIME    Polyneuropathy in other diseases classified elsewhere (H)       *  "gabapentin 600 MG tablet    NEURONTIN    90 tablet    TAKE ONE TABLET BY MOUTH THREE TIMES DAILY    Polyneuropathy in other diseases classified elsewhere (H)       ipratropium - albuterol 0.5 mg/2.5 mg/3 mL 0.5-2.5 (3) MG/3ML neb solution    DUONEB    30 vial    Take 1 vial (3 mLs) by nebulization every 6 hours as needed for shortness of breath / dyspnea or wheezing    Chronic obstructive pulmonary disease, unspecified COPD type (H)       lamoTRIgine 200 MG tablet    LaMICtal    30 tablet    TAKE ONE TABLET BY MOUTH ONE TIME DAILY    Major depressive disorder, recurrent episode, moderate (H)       levothyroxine 150 MCG tablet    SYNTHROID/LEVOTHROID    90 tablet    Take 1 tablet (150 mcg) by mouth daily    Hypothyroidism, unspecified type       lidocaine 5 % ointment    XYLOCAINE    240 g    Apply topically 3 times daily as needed for moderate pain    Acute pain of left knee       mometasone-formoterol 200-5 MCG/ACT oral inhaler    DULERA    13 g    Inhale 2 puffs into the lungs 2 times daily    COPD exacerbation (H), Chronic obstructive pulmonary disease, unspecified COPD type (H)       omeprazole 20 MG CR capsule    priLOSEC    180 capsule    TAKE ONE CAPSULE BY MOUTH TWICE DAILY    Gastroesophageal reflux disease without esophagitis       * order for DME      Equipment being ordered: CPAP AIRSENSE 10 5-18 CM H20 # [id number]   DN# [id number]        * order for DME     1 Month    Equipment being ordered: INCONTINENCE PADS  QID    Other urinary incontinence       * order for DME     60 Month    Equipment being ordered: DEPENDS SIZE LARGE    Mixed incontinence       * order for DME     2 Units    Equipment being ordered: LcwarlFkzq0432\" x2pair   \"20-30mmHg Farrow Hybrid Liners\" x 2 pair    Peripheral edema       order for DME     1 Device    Equipment being ordered: Nebulizer    COPD exacerbation (H), Chronic obstructive pulmonary disease, unspecified COPD type (H)       order for DME     1 each    Equipment being " ordered:x2 Biacare 30/40mmHg compression wraps with x2 extra prs of compression liners    Secondary lymphedema       potassium chloride SA 10 MEQ CR tablet    K-DUR/KLOR-CON M    90 tablet    TAKE ONE TABLET BY MOUTH ONE TIME DAILY    Lymphedema       pramipexole 0.125 MG tablet    MIRAPEX    60 tablet    TAKE 1-2 TABLETS BY MOUTH AT BEDTIME    Restless leg syndrome       simvastatin 20 MG tablet    ZOCOR    90 tablet    TAKE ONE TABLET BY MOUTH AT BEDTIME    Hypercholesteremia       traZODone 150 MG tablet    DESYREL    30 tablet    TAKE ONE TABLET BY MOUTH NIGHTLY AT BEDTIME    Major depressive disorder, recurrent episode, moderate (H)       warfarin 5 MG tablet    COUMADIN    180 tablet    Take 10 mg on Mon, Wed, Fri; 12.5 mg all other days or as directed by the Anticoagulation Clinic    DVT (deep venous thrombosis) (H)       * Notice:  This list has 8 medication(s) that are the same as other medications prescribed for you. Read the directions carefully, and ask your doctor or other care provider to review them with you.

## 2018-09-28 NOTE — NURSING NOTE
"Oncology Rooming Note    September 28, 2018 1:22 PM   Bia Bill is a 52 year old female who presents for:    Chief Complaint   Patient presents with     Hematology     6 month follow up erythrocytosis, anemia. Review lab results.      Initial Vitals: /52 (BP Location: Right arm, Patient Position: Sitting, Cuff Size: Adult Large)  Pulse 82  Temp 97.8  F (36.6  C) (Tympanic)  Resp 18  Ht 1.473 m (4' 10\")  Wt 104.6 kg (230 lb 9.6 oz)  LMP 09/27/2009  SpO2 95%  Breastfeeding? No  BMI 48.2 kg/m2 Estimated body mass index is 48.2 kg/(m^2) as calculated from the following:    Height as of this encounter: 1.473 m (4' 10\").    Weight as of this encounter: 104.6 kg (230 lb 9.6 oz). Body surface area is 2.07 meters squared.  Extreme Pain (8) Comment: shoulder and bilateral knee's.    Patient's last menstrual period was 09/27/2009.  Allergies reviewed: Yes  Medications reviewed: Yes    Medications: Medication refills not needed today.  Pharmacy name entered into [employer]    [place], [place] - [address] [id number] [place], [place] - [address] [facility] [place], [place] - [address] [place] [address]    Clinical concerns: 6 month follow up erythrocytosis, anemia. Review lab results. C/o noticing an increase in fatigue and finds herself sleeping more. 8/10 shoulder and knee pains.     8 minutes for nursing intake (face to face time)     Janice Gonzalez, JAYLYN            "

## 2018-09-28 NOTE — PATIENT INSTRUCTIONS
We would like to see you back in 6 months for a follow up appointment with labs prior.     When you are in need of a refill, please call your pharmacy and they will send us a request.      Copy of appointments, and after visit summary (AVS) given to patient.      If you have any questions please call Vanessa Torres RN, BSN Oncology Hematology  [facility] [phone]. For questions after business hours, or on holidays/weekends, please call our after hours Nurse Triage line [phone]. Thank you.

## 2018-09-28 NOTE — LETTER
9/28/2018         RE: Bia Bill  [address]  [facility] [address]        Dear Colleague,    Thank you for referring your patient, Bia Bill, to the [facility]. Please see a copy of my visit note below.    Hematology/ Oncology Follow-up Visit:  Sep 28, 2018    Reason for Visit:   Chief Complaint   Patient presents with     Hematology     6 month follow up erythrocytosis, anemia. Review lab results.        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 She was previously seen by Dr. Lomax on 10/09/2011, but has been lost from followup. The patient has had erythrocytosis with a hemoglobin around 20-23 and hemoglobin and hematocrit of 60-65% for most of her life. She had a previous workup by Dr. Lomax and also by another hematologist at [facility]. Review of her previous tests and workup showed a hemoglobin electrophoresis on 01/17/2007 that showed a hemoglobin A1 of 52%, A2 at 0, hemoglobin F at 1.4% and other hemoglobin 46.2%. JAK2 was negative on 07/31/2007. Hemoglobin affinity testing on 07/31/2007 showed a P50 of 27 (normal between 25 and 29). Repeat hemoglobin electrophoresis on 10/04/2011 showed the hemoglobin was at 39.9%. This illuminated hemoglobin was suggestive of beta globin variant of hemoglobin Hb Eugene. She was also seen by Dr. Peres in 2007 and most of the workup is from there. Erythropoietin level was in the range of the 80s. Vitamin B12 and folic acid were within normal limits. Abdomen ultrasound did not show hepatosplenomegaly, but there is fatty infiltration of the liver. According to the chart, the patient had phlebotomies done in the past to keep  her hemoglobin below 20 g/dl. This was done over the summertime of 2011. Most recently she didn't have any phlebotomy. She was started on anticoagulation with Coumadin as she was felt to be at high risk for blood clots and given history of severe arterial disease and stroke in the past. The patient is safe to use her anticoagulation and her most recent INR was 1.74. Evidently her carbon monoxide is also elevated. She is an active smoker, but she is cutting down from 2 packs per day to 3 cigarettes a day over the last one month. Patient denies any DVT or PE. She also evidently claims that her mother and her first son are also having the same hemoglobinopathy and are being followed by their physicians. Bone marrow biopsy on 11/17/2014 showed no clonal chromosomal abnormality and no cytogenetic evidence of a malignant process. She also tested negative for the JAK2 mutation. Leukemia lymphoma evaluation additionally showed no aberrant immunophenotype on T cells and no increase in or abnormal appearing myeloid blasts.       Interval History:  She returning today for follow-up.  She has been feeling well without any recent complaints weight loss night sweats fever or chills.  She has stopped smoking since last visit.  She denies any shortness of breath or cough or wheezing.    Review Of Systems:  Constitutional: Negative for fever, chills, and night sweats.  Skin: negative.  Eyes: negative.  Ears/Nose/Throat: negative.  Respiratory: No shortness of breath, dyspnea on exertion, cough, or hemoptysis.  Cardiovascular: negative.  Gastrointestinal: negative.  Genitourinary: negative.  Musculoskeletal: negative.  Neurologic: negative.  Psychiatric: negative.  Hematologic/Lymphatic/Immunologic: negative.  Endocrine: negative.    All other ROS negative unless mentioned in interval history.    Past medical, social, surgical, and family histories reviewed.    Allergies:  Allergies as of 09/28/2018 - Rylan as Reviewed 09/28/2018    Allergen Reaction Noted     Darvocet [propoxyphene n-apap] Anaphylaxis 07/26/2006     Percocet [oxycodone-acetaminophen] Anaphylaxis, Hives, and Swelling 06/01/2016     Asa [aspirin] Hives 07/10/2006     Bee  03/26/2010     Ibuprofen Swelling 03/30/2018     Lyrica [pregabalin] Other (See Comments) and Swelling 12/04/2015     Povidone iodine Rash 01/25/2017     Tape [adhesive tape] Rash 08/03/2006       Current Medications:  Current Outpatient Prescriptions   Medication Sig Dispense Refill     acetaminophen (TYLENOL) 500 MG tablet Take 500-1,000 mg by mouth every 6 hours as needed for mild pain       albuterol (2.5 MG/3ML) 0.083% neb solution Take 1 vial (2.5 mg) by nebulization every 6 hours as needed for shortness of breath / dyspnea or wheezing 1 Box 0     albuterol (PROAIR HFA/PROVENTIL HFA/VENTOLIN HFA) 108 (90 BASE) MCG/ACT Inhaler Inhale 2 puffs into the lungs every 6 hours as needed for shortness of breath / dyspnea or wheezing 3 Inhaler 1     allopurinol (ZYLOPRIM) 100 MG tablet TAKE ONE TABLET BY MOUTH ONE TIME DAILY 30 tablet 0     ARIPiprazole (ABILIFY) 10 MG tablet Take 1 tablet (10 mg) by mouth daily 90 tablet 1     buPROPion (WELLBUTRIN XL) 150 MG 24 hr tablet TAKE  ONE TABLET BY MOUTH EVERY DAY IN THE MORNING 30 tablet 0     cholecalciferol (VITAMIN D3) 1000 UNIT tablet Take 1,000 Units by mouth daily       furosemide (LASIX) 20 MG tablet TAKE TWO TABLETS BY MOUTH TWICE DAILY 180 tablet 2     gabapentin (NEURONTIN) 300 MG capsule ONE CAPSULE BY MOUTH AT BEDTIME WITH 600 MG CAPSULE- FOR TOTAL  MG AT BEDTIME 90 capsule 2     gabapentin (NEURONTIN) 600 MG tablet TAKE ONE TABLET BY MOUTH THREE TIMES DAILY 90 tablet 0     ipratropium - albuterol 0.5 mg/2.5 mg/3 mL (DUONEB) 0.5-2.5 (3) MG/3ML neb solution Take 1 vial (3 mLs) by nebulization every 6 hours as needed for shortness of breath / dyspnea or wheezing 30 vial 1     lamoTRIgine (LAMICTAL) 200 MG tablet TAKE ONE TABLET BY MOUTH ONE TIME DAILY  "30 tablet 1     levothyroxine (SYNTHROID/LEVOTHROID) 150 MCG tablet Take 1 tablet (150 mcg) by mouth daily 90 tablet 2     lidocaine (XYLOCAINE) 5 % ointment Apply topically 3 times daily as needed for moderate pain 240 g 1     mometasone-formoterol (DULERA) 200-5 MCG/ACT oral inhaler Inhale 2 puffs into the lungs 2 times daily 13 g 1     omeprazole (PRILOSEC) 20 MG CR capsule TAKE ONE CAPSULE BY MOUTH TWICE DAILY 180 capsule 3     order for DME Equipment being ordered:x2 [employer] 30/40mmHg compression wraps with x2 extra prs of compression liners 1 each 0     order for DME Equipment being ordered: Nebulizer 1 Device 0     order for DME Equipment being ordered: CPAP  AIRSENSE 10  5-18 CM H20  SN# [id number]   DN# [id number]       order for DME Equipment being ordered: [id number]\" x2pair     \"20-30mmHg [employer] Hybrid Liners\" x 2 pair 2 Units 11     order for DME Equipment being ordered: DEPENDS SIZE LARGE 60 Month 5     order for DME Equipment being ordered: INCONTINENCE PADS   QID 1 Month 11     potassium chloride SA (K-DUR/KLOR-CON M) 10 MEQ CR tablet TAKE ONE TABLET BY MOUTH ONE TIME DAILY 90 tablet 3     pramipexole (MIRAPEX) 0.125 MG tablet TAKE 1-2 TABLETS BY MOUTH AT BEDTIME 60 tablet 10     simvastatin (ZOCOR) 20 MG tablet TAKE ONE TABLET BY MOUTH AT BEDTIME 90 tablet 1     warfarin (COUMADIN) 5 MG tablet Take 10 mg on Mon, Wed, Fri; 12.5 mg all other days or as directed by the Anticoagulation Clinic  180 tablet 2     EPINEPHrine (EPIPEN/ADRENACLICK/OR ANY BX GENERIC EQUIV) 0.3 MG/0.3ML injection 2-pack Inject 0.3 mLs (0.3 mg) into the muscle once as needed for anaphylaxis (Patient not taking: Reported on 9/12/2018) 0.6 mL 0     traZODone (DESYREL) 150 MG tablet TAKE ONE TABLET BY MOUTH NIGHTLY AT BEDTIME 30 tablet 0        Physical Exam:  /52 (BP Location: Right arm, Patient Position: Sitting, Cuff Size: Adult Large)  Pulse 82  Temp 97.8  F (36.6  C) (Tympanic)  Resp 18  Ht 1.473 m (4' 10\")  Wt 104.6 " kg (230 lb 9.6 oz)  LMP 09/27/2009  SpO2 95%  Breastfeeding? No  BMI 48.2 kg/m2  Wt Readings from Last 12 Encounters:   09/28/18 104.6 kg (230 lb 9.6 oz)   09/12/18 105.6 kg (232 lb 12.8 oz)   09/06/18 106.1 kg (234 lb)   08/22/18 106.1 kg (234 lb)   08/15/18 105.1 kg (231 lb 9.6 oz)   07/10/18 104.3 kg (230 lb)   06/13/18 105.2 kg (232 lb)   05/31/18 104.9 kg (231 lb 3.2 oz)   05/16/18 105.3 kg (232 lb 1.6 oz)   05/03/18 105.1 kg (231 lb 12.8 oz)   05/02/18 106.1 kg (233 lb 12.8 oz)   04/24/18 101.2 kg (223 lb)     GENERAL APPEARANCE: Healthy, alert and in no acute distress.  HEENT: Sclerae anicteric. PERRLA. Oropharynx without ulcers, lesions, or thrush.  NECK: Supple. No asymmetry or masses.  LYMPHATICS: No palpable cervical, supraclavicular, axillary, or inguinal lymphadenopathy.  RESP: Lungs clear to auscultation bilaterally without rales, rhonchi or wheezes.  CARDIOVASCULAR: Regular rate and rhythm. Normal S1, S2; no S3 or S4. No murmur, gallop, or rub.  ABDOMEN: Soft, nontender. Bowel sounds normal. No palpable organomegaly or masses.  MUSCULOSKELETAL: Extremities without gross deformities noted. No edema of bilateral lower extremities.  SKIN: No suspicious lesions or rashes.  NEURO: Alert and oriented x 3. Cranial nerves II-XII grossly intact.  PSYCHIATRIC: Mentation and affect appear normal.    Laboratory/Imaging Studies:  No visits with results within 2 Week(s) from this visit.  Latest known visit with results is:    Office Visit on 09/06/2018   Component Date Value Ref Range Status     Color Urine 09/06/2018 Yellow   Final     Appearance Urine 09/06/2018 Clear   Final     Glucose Urine 09/06/2018 Negative  NEG^Negative mg/dL Final     Bilirubin Urine 09/06/2018 Negative  NEG^Negative Final     Ketones Urine 09/06/2018 Negative  NEG^Negative mg/dL Final     Specific Gravity Urine 09/06/2018 1.010  1.003 - 1.035 Final     Blood Urine 09/06/2018 Negative  NEG^Negative Final     pH Urine 09/06/2018 6.5   5.0 - 7.0 pH Final     Protein Albumin Urine 09/06/2018 Negative  NEG^Negative mg/dL Final     Urobilinogen Urine 09/06/2018 0.2  0.2 - 1.0 EU/dL Final     Nitrite Urine 09/06/2018 Negative  NEG^Negative Final     Leukocyte Esterase Urine 09/06/2018 Negative  NEG^Negative Final     Source 09/06/2018 Midstream Urine   Final        Recent Results (from the past 744 hour(s))   XR Hand Left G/E 3 Views    Narrative    LEFT HAND THREE VIEWS  9/12/2018 2:00 PM    HISTORY:  Left hand injury.    COMPARISON:  7/26/2010    FINDINGS:  No fracture or osseous lesion is seen. The joint spaces are  well preserved. No adjacent soft tissue pathology is seen. I see no  definite change since the previous examination.       Impression    IMPRESSION:  Unremarkable examination.      ИРИНА WILEY MD       Assessment and plan:    (D75.1) Erythrocytosis  Due to the patient most recent laboratory test.  Medical rate has been elevated.  The patient will continue on aspirin as prescribed    (J44.9) Chronic obstructive pulmonary disease, unspecified COPD type (H)  (primary encounter diagnosis)  Patient has been using her inhalers.  Her symptoms is well controlled per    (E03.9) Hypothyroidism, unspecified type  Currently on Synthroid 150 mcg orally daily.    (I10) HTN, goal below 140/90  Blood pressure is currently well controlled.    (K21.9) Gastroesophageal reflux disease without esophagitis  Currently on omeprazole 20 mg orally daily.    The patient is ready to learn, no apparent learning barriers were identified.  Diagnosis and treatment plans were explained to the patient. The patient expressed understanding of the content. The patient asked appropriate questions. The patient questions were answered to her satisfaction.    Chart documentation with Dragon Voice recognition Software. Although reviewed after completion, some words and grammatical errors may remain.    Again, thank you for allowing me to participate in the care of your  patient.        Sincerely,        Joshua Watts MD

## 2018-09-30 DIAGNOSIS — F33.1 MAJOR DEPRESSIVE DISORDER, RECURRENT EPISODE, MODERATE (H): ICD-10-CM

## 2018-10-01 NOTE — PATIENT INSTRUCTIONS
Leg and Knee Exercises: Heel Raise    This exercise is designed to strengthen your knee and calf. Before beginning, read through all the instructions. While exercising, breathe normally and use smooth movements. If you feel any pain, stop the exercise. If pain persists, inform your healthcare provider.  Caution    Don t lock your knees.    Don t arch your back.     Stand with both feet flat on the floor, shoulder-width apart.    If you need support, steady yourself with your hand on a ledge, wall, or table.    Raise both heels so you re standing on your toes.    Hold for ______ seconds. Slowly lower your heels to the floor.    Repeat ______ times. Do ______ sets a day.  Note: As you become stronger, stand on one foot at a time, and raise that heel off the floor.  Date Last Reviewed: 11/1/2017 2000-2017 [employer]. [address] [place] [address]. All rights reserved. This information is not intended as a substitute for professional medical care. Always follow your healthcare professional's instructions.        Leg and Knee Exercises: Leg Press    The following exercise helps build strong, balanced leg muscles. Make sure to adjust exercise machines as instructed by your physical therapist. He or she will tell you how many times to do the exercise.  Note: To prevent injury, always warm up and stretch before your strengthening exercises. Stop any exercise that causes pain. Discuss it with your physical therapist or healthcare provider.  Here are the steps for the leg press:    Start with your leg bent so your knee is at a 90-degree angle.    Push with your leg until it is almost completely straight. Be sure not to lock your knee.    Slowly and steadily return your leg to its original position.  Date Last Reviewed: 11/1/2017 2000-2017 [employer]. [address] [place] [address]. All rights reserved. This information is not intended as a substitute for  professional medical care. Always follow your healthcare professional's instructions.        Reducing Knee Pain and Swelling    Many treatments can help reduce pain and swelling in your knee. Your healthcare provider or physical therapist may suggest one or more of the following treatments:    Icing your knee helps reduce swelling. You may be asked to ice your knee once a day or more. Apply ice for about 15 to 20 minutes at a time, with at least 40 minutes between sessions. Always keep a towel between the ice and your skin.     Keeping your leg raised above your heart helps excess fluid flow out of your knee joint. This reduces swelling.    Compression means wrapping an elastic bandage or neoprene sleeve snugly around your knees. This keeps fluid from collecting in your knee joint.    Electrical stimulation, done by a physical therapist or , can help reduce excess fluid in your knee joint.    Anti-inflammatory medicines may be prescribed by your healthcare provider. You may take pills or receive injections in your knee.    Isometric (stiven) exercises strengthen the muscles that support your knee joint. They also help reduce excess fluid in your knee.    Massage helps fluid drain away from your knee.  Date Last Reviewed: 10/13/2015    7079-3183 [employer]. [address] [place], [place] [id number]. All rights reserved. This information is not intended as a substitute for professional medical care. Always follow your healthcare professional's instructions.        Understanding the Pain Response  Your pain is important. It can slow healing and keep you from being active. You may have acute or chronic pain. Both types of pain respond to treatment. Work with your healthcare professional. Together you can find relief.  Types of pain  Acute pain is caused by a health problem or injury. The pain usually goes away when its cause is treated. You may have pain:    From an illness or injury  that needs emergency care    After an operation, such as heart surgery    During and after the birth of your baby  Chronic pain lasts 3 to 6 months or more. It can be caused by a health problem or injury, like arthritis or a shoulder strain. Chronic pain can also exist without a clear cause.  Your perception of pain  Pain is a complex phenomenon that involves many of the chemicals found naturally in the spinal cord and brain. All pain signals travel to the brain. The brain sends back signals to protect the body. The brain also makes its own painkillers (endorphins). These can help reduce the pain.     1. Pain starts in 1 or more parts of the body. In some cases, the site of the pain is far from its source.  2. Pain signals move through nerves and up the spinal cord.  3. The brain reads the signals as pain. Natural painkillers are released.  4. The feeling of pain can be reduced in this way.  Date Last Reviewed: 5/1/2017 2000-2017 [employer]. [address] [place] [address]. All rights reserved. This information is not intended as a substitute for professional medical care. Always follow your healthcare professional's instructions.        R.I.C.E.    R.I.C.E. stands for Rest, Ice, Compression, and Elevation. Doing these things helps limit pain and swelling after an injury. R.I.C.E. also helps injuries heal faster. Use R.I.C.E. for sprains, strains, and severe bruises or bumps. Follow the tips on this handout and begin R.I.C.E. as soon as possible after an injury.  ? Rest  Pain is your body s way of telling you to rest an injured area. Whether you have hurt an elbow, hand, foot, or knee, limiting its use will prevent further injury and help you heal.  ? Ice  Applying ice right after an injury helps prevent swelling and reduce pain. Don t place ice directly on your skin.    Wrap a cold pack or bag of ice in a thin cloth. Place it over the injured area.    Ice for 10 minutes every 3 hours.  Don t ice for more than 20 minutes at a time.  ? Compression  Putting pressure (compression) on an injury helps prevent swelling and provides support.    Wrap the injured area firmly with an elastic bandage. If your hand or foot tingles, becomes discolored, or feels cold to the touch, the bandage may be too tight. Rewrap it more loosely.    If your bandage becomes too loose, rewrap it.    Do not wear an elastic bandage overnight.  ? Elevation  Keeping an injury elevated helps reduce swelling, pain, and throbbing. Elevation is most effective when the injury is kept elevated higher than the heart.     Call your healthcare provider if you notice any of the following:    Fingers or toes feel numb, are cold to the touch, or change color    Skin looks shiny or tight    Pain, swelling, or bruising worsens and is not improved with elevation   Date Last Reviewed: 9/3/2015    5955-2893 [employer]. [address] [place] [address], [place] [address]. All rights reserved. This information is not intended as a substitute for professional medical care. Always follow your healthcare professional's instructions.

## 2018-10-01 NOTE — TELEPHONE ENCOUNTER
"TRAZODONE 150 MG TAB TEVA        Last Written Prescription Date:  8/6/18  Last Fill Quantity: 30,   # refills: 0  Last Office Visit: 9/12/18  Future Office visit:    Next 5 appointments (look out 90 days)     Oct 03, 2018  1:20 PM CDT   [facility]    [address] [facility] [address]   [phone]            Oct 05, 2018  9:30 AM CDT   Return Visit with Kd Morris   [facility] ([facility]    [address] [facility] [address]   [phone]            Oct 19, 2018  8:30 AM CDT   Return Visit with Kd Morris   [facility] ([facility])    [address] [facility] [address]   [phone]                   Requested Prescriptions   Pending Prescriptions Disp Refills     traZODone (DESYREL) 150 MG tablet [Pharmacy Med Name: TRAZODONE 150 MG    TAB TEVA] 30 tablet 0     Sig: TAKE ONE TABLET BY MOUTH NIGHTLY AT BEDTIME    Serotonin Modulators Passed    9/30/2018  9:56 AM       Passed - Recent (12 mo) or future (30 days) visit within the authorizing provider's specialty    Patient had office visit in the last 12 months or has a visit in the next 30 days with authorizing provider or within the authorizing provider's specialty.  See \"Patient Info\" tab in inbasket, or \"Choose Columns\" in Meds & Orders section of the refill encounter.           Passed - Patient is age 18 or older       Passed - No active pregnancy on record       Passed - No positive pregnancy test in past 12 months          "

## 2018-10-01 NOTE — TELEPHONE ENCOUNTER
**This refill requires provider completion and is not appropriate for RN review per RN refill guidelines.**  PH-Q9 needs to be less than 5 to approve medication on RN Refill protocol pt's score is 13.  Bailey Lozano RN

## 2018-10-02 RX ORDER — TRAZODONE HYDROCHLORIDE 150 MG/1
TABLET ORAL
Qty: 30 TABLET | Refills: 0 | Status: SHIPPED | OUTPATIENT
Start: 2018-10-02 | End: 2018-10-30

## 2018-10-02 NOTE — PROGRESS NOTES
Hematology/ Oncology Follow-up Visit:  Sep 28, 2018    Reason for Visit:   Chief Complaint   Patient presents with     Hematology     6 month follow up erythrocytosis, anemia. Review lab results.        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 She was previously seen by Dr. Lomax on 10/09/2011, but has been lost from followup. The patient has had erythrocytosis with a hemoglobin around 20-23 and hemoglobin and hematocrit of 60-65% for most of her life. She had a previous workup by Dr. Lomax and also by another hematologist at [facility]. Review of her previous tests and workup showed a hemoglobin electrophoresis on 01/17/2007 that showed a hemoglobin A1 of 52%, A2 at 0, hemoglobin F at 1.4% and other hemoglobin 46.2%. JAK2 was negative on 07/31/2007. Hemoglobin affinity testing on 07/31/2007 showed a P50 of 27 (normal between 25 and 29). Repeat hemoglobin electrophoresis on 10/04/2011 showed the hemoglobin was at 39.9%. This illuminated hemoglobin was suggestive of beta globin variant of hemoglobin Hb [place]. She was also seen by Dr. Peres in 2007 and most of the workup is from there. Erythropoietin level was in the range of the 80s. Vitamin B12 and folic acid were within normal limits. Abdomen ultrasound did not show hepatosplenomegaly, but there is fatty infiltration of the liver. According to the chart, the patient had phlebotomies done in the past to keep her hemoglobin below 20 g/dl. This was done over the summertime of 2011. Most recently she didn't have any phlebotomy. She was started on anticoagulation with Coumadin as she was felt to be at high risk for blood clots and given history of severe  arterial disease and stroke in the past. The patient is safe to use her anticoagulation and her most recent INR was 1.74. Evidently her carbon monoxide is also elevated. She is an active smoker, but she is cutting down from 2 packs per day to 3 cigarettes a day over the last one month. Patient denies any DVT or PE. She also evidently claims that her mother and her first son are also having the same hemoglobinopathy and are being followed by their physicians. Bone marrow biopsy on 11/17/2014 showed no clonal chromosomal abnormality and no cytogenetic evidence of a malignant process. She also tested negative for the JAK2 mutation. Leukemia lymphoma evaluation additionally showed no aberrant immunophenotype on T cells and no increase in or abnormal appearing myeloid blasts.       Interval History:  She returning today for follow-up.  She has been feeling well without any recent complaints weight loss night sweats fever or chills.  She has stopped smoking since last visit.  She denies any shortness of breath or cough or wheezing.    Review Of Systems:  Constitutional: Negative for fever, chills, and night sweats.  Skin: negative.  Eyes: negative.  Ears/Nose/Throat: negative.  Respiratory: No shortness of breath, dyspnea on exertion, cough, or hemoptysis.  Cardiovascular: negative.  Gastrointestinal: negative.  Genitourinary: negative.  Musculoskeletal: negative.  Neurologic: negative.  Psychiatric: negative.  Hematologic/Lymphatic/Immunologic: negative.  Endocrine: negative.    All other ROS negative unless mentioned in interval history.    Past medical, social, surgical, and family histories reviewed.    Allergies:  Allergies as of 09/28/2018 - Rylan as Reviewed 09/28/2018   Allergen Reaction Noted     Darvocet [propoxyphene n-apap] Anaphylaxis 07/26/2006     Percocet [oxycodone-acetaminophen] Anaphylaxis, Hives, and Swelling 06/01/2016     Asa [aspirin] Hives 07/10/2006     Bee  03/26/2010     Ibuprofen Swelling  03/30/2018     Lyrica [pregabalin] Other (See Comments) and Swelling 12/04/2015     Povidone iodine Rash 01/25/2017     Tape [adhesive tape] Rash 08/03/2006       Current Medications:  Current Outpatient Prescriptions   Medication Sig Dispense Refill     acetaminophen (TYLENOL) 500 MG tablet Take 500-1,000 mg by mouth every 6 hours as needed for mild pain       albuterol (2.5 MG/3ML) 0.083% neb solution Take 1 vial (2.5 mg) by nebulization every 6 hours as needed for shortness of breath / dyspnea or wheezing 1 Box 0     albuterol (PROAIR HFA/PROVENTIL HFA/VENTOLIN HFA) 108 (90 BASE) MCG/ACT Inhaler Inhale 2 puffs into the lungs every 6 hours as needed for shortness of breath / dyspnea or wheezing 3 Inhaler 1     allopurinol (ZYLOPRIM) 100 MG tablet TAKE ONE TABLET BY MOUTH ONE TIME DAILY 30 tablet 0     ARIPiprazole (ABILIFY) 10 MG tablet Take 1 tablet (10 mg) by mouth daily 90 tablet 1     buPROPion (WELLBUTRIN XL) 150 MG 24 hr tablet TAKE  ONE TABLET BY MOUTH EVERY DAY IN THE MORNING 30 tablet 0     cholecalciferol (VITAMIN D3) 1000 UNIT tablet Take 1,000 Units by mouth daily       furosemide (LASIX) 20 MG tablet TAKE TWO TABLETS BY MOUTH TWICE DAILY 180 tablet 2     gabapentin (NEURONTIN) 300 MG capsule ONE CAPSULE BY MOUTH AT BEDTIME WITH 600 MG CAPSULE- FOR TOTAL  MG AT BEDTIME 90 capsule 2     gabapentin (NEURONTIN) 600 MG tablet TAKE ONE TABLET BY MOUTH THREE TIMES DAILY 90 tablet 0     ipratropium - albuterol 0.5 mg/2.5 mg/3 mL (DUONEB) 0.5-2.5 (3) MG/3ML neb solution Take 1 vial (3 mLs) by nebulization every 6 hours as needed for shortness of breath / dyspnea or wheezing 30 vial 1     lamoTRIgine (LAMICTAL) 200 MG tablet TAKE ONE TABLET BY MOUTH ONE TIME DAILY 30 tablet 1     levothyroxine (SYNTHROID/LEVOTHROID) 150 MCG tablet Take 1 tablet (150 mcg) by mouth daily 90 tablet 2     lidocaine (XYLOCAINE) 5 % ointment Apply topically 3 times daily as needed for moderate pain 240 g 1      "mometasone-formoterol (DULERA) 200-5 MCG/ACT oral inhaler Inhale 2 puffs into the lungs 2 times daily 13 g 1     omeprazole (PRILOSEC) 20 MG CR capsule TAKE ONE CAPSULE BY MOUTH TWICE DAILY 180 capsule 3     order for DME Equipment being ordered:x2 Biacare 30/40mmHg compression wraps with x2 extra prs of compression liners 1 each 0     order for DME Equipment being ordered: Nebulizer 1 Device 0     order for DME Equipment being ordered: CPAP  AIRSENSE 10  5-18 CM H20  SN# [id number]   DN# [id number]       order for DME Equipment being ordered: [id number]\" x2pair     \"20-30mmHg Farrow Hybrid Liners\" x 2 pair 2 Units 11     order for DME Equipment being ordered: DEPENDS SIZE LARGE 60 Month 5     order for DME Equipment being ordered: INCONTINENCE PADS   QID 1 Month 11     potassium chloride SA (K-DUR/KLOR-CON M) 10 MEQ CR tablet TAKE ONE TABLET BY MOUTH ONE TIME DAILY 90 tablet 3     pramipexole (MIRAPEX) 0.125 MG tablet TAKE 1-2 TABLETS BY MOUTH AT BEDTIME 60 tablet 10     simvastatin (ZOCOR) 20 MG tablet TAKE ONE TABLET BY MOUTH AT BEDTIME 90 tablet 1     warfarin (COUMADIN) 5 MG tablet Take 10 mg on Mon, Wed, Fri; 12.5 mg all other days or as directed by the Anticoagulation Clinic  180 tablet 2     EPINEPHrine (EPIPEN/ADRENACLICK/OR ANY BX GENERIC EQUIV) 0.3 MG/0.3ML injection 2-pack Inject 0.3 mLs (0.3 mg) into the muscle once as needed for anaphylaxis (Patient not taking: Reported on 9/12/2018) 0.6 mL 0     traZODone (DESYREL) 150 MG tablet TAKE ONE TABLET BY MOUTH NIGHTLY AT BEDTIME 30 tablet 0        Physical Exam:  /52 (BP Location: Right arm, Patient Position: Sitting, Cuff Size: Adult Large)  Pulse 82  Temp 97.8  F (36.6  C) (Tympanic)  Resp 18  Ht 1.473 m (4' 10\")  Wt 104.6 kg (230 lb 9.6 oz)  LMP 09/27/2009  SpO2 95%  Breastfeeding? No  BMI 48.2 kg/m2  Wt Readings from Last 12 Encounters:   09/28/18 104.6 kg (230 lb 9.6 oz)   09/12/18 105.6 kg (232 lb 12.8 oz)   09/06/18 106.1 kg (234 lb) "   08/22/18 106.1 kg (234 lb)   08/15/18 105.1 kg (231 lb 9.6 oz)   07/10/18 104.3 kg (230 lb)   06/13/18 105.2 kg (232 lb)   05/31/18 104.9 kg (231 lb 3.2 oz)   05/16/18 105.3 kg (232 lb 1.6 oz)   05/03/18 105.1 kg (231 lb 12.8 oz)   05/02/18 106.1 kg (233 lb 12.8 oz)   04/24/18 101.2 kg (223 lb)     GENERAL APPEARANCE: Healthy, alert and in no acute distress.  HEENT: Sclerae anicteric. PERRLA. Oropharynx without ulcers, lesions, or thrush.  NECK: Supple. No asymmetry or masses.  LYMPHATICS: No palpable cervical, supraclavicular, axillary, or inguinal lymphadenopathy.  RESP: Lungs clear to auscultation bilaterally without rales, rhonchi or wheezes.  CARDIOVASCULAR: Regular rate and rhythm. Normal S1, S2; no S3 or S4. No murmur, gallop, or rub.  ABDOMEN: Soft, nontender. Bowel sounds normal. No palpable organomegaly or masses.  MUSCULOSKELETAL: Extremities without gross deformities noted. No edema of bilateral lower extremities.  SKIN: No suspicious lesions or rashes.  NEURO: Alert and oriented x 3. Cranial nerves II-XII grossly intact.  PSYCHIATRIC: Mentation and affect appear normal.    Laboratory/Imaging Studies:  No visits with results within 2 Week(s) from this visit.  Latest known visit with results is:    Office Visit on 09/06/2018   Component Date Value Ref Range Status     Color Urine 09/06/2018 Yellow   Final     Appearance Urine 09/06/2018 Clear   Final     Glucose Urine 09/06/2018 Negative  NEG^Negative mg/dL Final     Bilirubin Urine 09/06/2018 Negative  NEG^Negative Final     Ketones Urine 09/06/2018 Negative  NEG^Negative mg/dL Final     Specific Gravity Urine 09/06/2018 1.010  1.003 - 1.035 Final     Blood Urine 09/06/2018 Negative  NEG^Negative Final     pH Urine 09/06/2018 6.5  5.0 - 7.0 pH Final     Protein Albumin Urine 09/06/2018 Negative  NEG^Negative mg/dL Final     Urobilinogen Urine 09/06/2018 0.2  0.2 - 1.0 EU/dL Final     Nitrite Urine 09/06/2018 Negative  NEG^Negative Final     Leukocyte  Esterase Urine 09/06/2018 Negative  NEG^Negative Final     Source 09/06/2018 Midstream Urine   Final        Recent Results (from the past 744 hour(s))   XR Hand Left G/E 3 Views    Narrative    LEFT HAND THREE VIEWS  9/12/2018 2:00 PM    HISTORY:  Left hand injury.    COMPARISON:  7/26/2010    FINDINGS:  No fracture or osseous lesion is seen. The joint spaces are  well preserved. No adjacent soft tissue pathology is seen. I see no  definite change since the previous examination.       Impression    IMPRESSION:  Unremarkable examination.      ИРИНА WILEY MD       Assessment and plan:    (D75.1) Erythrocytosis  Due to the patient most recent laboratory test.  Medical rate has been elevated.  The patient will continue on aspirin as prescribed    (J44.9) Chronic obstructive pulmonary disease, unspecified COPD type (H)  (primary encounter diagnosis)  Patient has been using her inhalers.  Her symptoms is well controlled per    (E03.9) Hypothyroidism, unspecified type  Currently on Synthroid 150 mcg orally daily.    (I10) HTN, goal below 140/90  Blood pressure is currently well controlled.    (K21.9) Gastroesophageal reflux disease without esophagitis  Currently on omeprazole 20 mg orally daily.    The patient is ready to learn, no apparent learning barriers were identified.  Diagnosis and treatment plans were explained to the patient. The patient expressed understanding of the content. The patient asked appropriate questions. The patient questions were answered to her satisfaction.    Chart documentation with Dragon Voice recognition Software. Although reviewed after completion, some words and grammatical errors may remain.

## 2018-10-02 NOTE — ASSESSMENT & PLAN NOTE
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 She was previously seen by Dr. Lomax on 10/09/2011, but has been lost from followup. The patient has had erythrocytosis with a hemoglobin around 20-23 and hemoglobin and hematocrit of 60-65% for most of her life. She had a previous workup by Dr. Lomax and also by another hematologist at [facility]. Review of her previous tests and workup showed a hemoglobin electrophoresis on 01/17/2007 that showed a hemoglobin A1 of 52%, A2 at 0, hemoglobin F at 1.4% and other hemoglobin 46.2%. JAK2 was negative on 07/31/2007. Hemoglobin affinity testing on 07/31/2007 showed a P50 of 27 (normal between 25 and 29). Repeat hemoglobin electrophoresis on 10/04/2011 showed the hemoglobin was at 39.9%. This illuminated hemoglobin was suggestive of beta globin variant of hemoglobin Hb [place]. She was also seen by Dr. Peres in 2007 and most of the workup is from there. Erythropoietin level was in the range of the 80s. Vitamin B12 and folic acid were within normal limits. Abdomen ultrasound did not show hepatosplenomegaly, but there is fatty infiltration of the liver. According to the chart, the patient had phlebotomies done in the past to keep her hemoglobin below 20 g/dl. This was done over the summertime of 2011. Most recently she didn't have any phlebotomy. She was started on anticoagulation with Coumadin as she was felt to be at high risk for blood clots and given history of severe arterial disease and stroke in the past. The patient is safe to use her anticoagulation and her most recent INR was 1.74. Evidently her carbon monoxide is also elevated. She is an active smoker, but she is cutting down from 2 packs per day to  3 cigarettes a day over the last one month. Patient denies any DVT or PE. She also evidently claims that her mother and her first son are also having the same hemoglobinopathy and are being followed by their physicians. Bone marrow biopsy on 11/17/2014 showed no clonal chromosomal abnormality and no cytogenetic evidence of a malignant process. She also tested negative for the JAK2 mutation. Leukemia lymphoma evaluation additionally showed no aberrant immunophenotype on T cells and no increase in or abnormal appearing myeloid blasts.

## 2018-10-03 ENCOUNTER — OFFICE VISIT (OUTPATIENT)
Dept: FAMILY MEDICINE | Facility: CLINIC | Age: 53
End: 2018-10-03
Payer: COMMERCIAL

## 2018-10-03 VITALS
RESPIRATION RATE: 18 BRPM | TEMPERATURE: 99.4 F | BODY MASS INDEX: 48.4 KG/M2 | HEART RATE: 78 BPM | DIASTOLIC BLOOD PRESSURE: 76 MMHG | SYSTOLIC BLOOD PRESSURE: 126 MMHG | OXYGEN SATURATION: 97 % | WEIGHT: 231.6 LBS

## 2018-10-03 DIAGNOSIS — I82.4Y9 DEEP VEIN THROMBOSIS (DVT) OF PROXIMAL LOWER EXTREMITY, UNSPECIFIED CHRONICITY, UNSPECIFIED LATERALITY (H): Primary | Chronic | ICD-10-CM

## 2018-10-03 DIAGNOSIS — E66.2 MORBID OBESITY WITH ALVEOLAR HYPOVENTILATION (H): ICD-10-CM

## 2018-10-03 DIAGNOSIS — M1A.3490 CHRONIC GOUT OF HAND DUE TO RENAL IMPAIRMENT WITHOUT TOPHUS, UNSPECIFIED LATERALITY: ICD-10-CM

## 2018-10-03 DIAGNOSIS — D75.1 ERYTHROCYTOSIS: Chronic | ICD-10-CM

## 2018-10-03 LAB — URATE SERPL-MCNC: 6.8 MG/DL (ref 2.6–6)

## 2018-10-03 PROCEDURE — 36415 COLL VENOUS BLD VENIPUNCTURE: CPT | Performed by: FAMILY MEDICINE

## 2018-10-03 PROCEDURE — 99214 OFFICE O/P EST MOD 30 MIN: CPT | Performed by: FAMILY MEDICINE

## 2018-10-03 PROCEDURE — 84550 ASSAY OF BLOOD/URIC ACID: CPT | Performed by: FAMILY MEDICINE

## 2018-10-03 ASSESSMENT — PAIN SCALES - GENERAL: PAINLEVEL: MODERATE PAIN (5)

## 2018-10-03 NOTE — MR AVS SNAPSHOT
After Visit Summary   10/3/2018    Bia Bill    MRN: [id number]           Patient Information     Date Of Birth          1965        Visit Information        Provider Department      10/3/2018 1:20 PM [facility]        Today's Diagnoses     Deep vein thrombosis (DVT) of proximal lower extremity, unspecified chronicity, unspecified laterality (H)    -  1    Erythrocytosis        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        Morbid obesity with alveolar hypoventilation (H)           Follow-ups after your visit        Your next 10 appointments already scheduled     Oct 05, 2018  9:30 AM CDT   Return Visit with Kd CHAWLA [facility] ([facility])    [address] [address]   [phone]            Oct 19, 2018  8:30 AM CDT   [facility]    [address] [address]   [phone]            Mar 29, 2019  1:10 PM CDT   LAB with [facility] ([facility])    [address] [address]   [phone]           Please do not eat 10-12 hours before your appointment if you are coming in fasting for labs on lipids, cholesterol, or glucose (sugar). This does not apply to pregnant women. Water, hot tea and black coffee (with nothing added) are okay. Do not drink other fluids, diet soda or chew gum.            Mar 29, 2019  2:00 PM CDT   Return Visit with Joshua Watts MD   [facility] ([facility])    [facility]  [address]  [facility] [address]   [phone]              Who to contact     Normal or non-critical lab and imaging results will be communicated to you by MyChart, letter or phone within 4 business days after the clinic has received the results.  "If you do not hear from us within 7 days, please contact the clinic through [employer] or phone. If you have a critical or abnormal lab result, we will notify you by phone as soon as possible.  Submit refill requests through [employer] or call your pharmacy and they will forward the refill request to us. Please allow 3 business days for your refill to be completed.          If you need to speak with a  for additional information , please call: [phone]             Additional Information About Your Visit        [employer] Information     [employer] lets you send messages to your doctor, view your test results, renew your prescriptions, schedule appointments and more. To sign up, go to [email or web address] . Click on \"Log in\" on the left side of the screen, which will take you to the Welcome page. Then click on \"Sign up Now\" on the right side of the page.     You will be asked to enter the access code listed below, as well as some personal information. Please follow the directions to create your username and password.     Your access code is: [email or web address]  Expires: 12/10/2018 10:27 AM     Your access code will  in 90 days. If you need help or a new code, please call your [place] clinic or [phone].        Care EveryWhere ID     This is your Care EveryWhere ID. This could be used by other organizations to access your [place] medical records  [id number]        Your Vitals Were     Pulse Temperature Respirations Last Period Pulse Oximetry BMI (Body Mass Index)    78 99.4  F (37.4  C) (Tympanic) 18 2009 97% 48.4 kg/m2       Blood Pressure from Last 3 Encounters:   10/03/18 126/76   18 125/52   18 134/63    Weight from Last 3 Encounters:   10/03/18 231 lb 9.6 oz (105.1 kg)   18 230 lb 9.6 oz (104.6 kg)   18 232 lb 12.8 oz (105.6 kg)              We Performed the Following     Uric acid        Primary Care Provider Office Phone # Fax #    Kd Leong MD " [phone]       [address] [facility] [address]        Equal Access to Services     ANTIONETTE IRBY : Hadii aad ku hadpaola Clark, wahoada luqadaha, qaybta kaalmada obed, nitin lemusnorth craig. So [facility] [phone].    ATENCIÓN: Si habla español, tiene a terry disposición servicios gratuitos de asistencia lingüística. Natalie al [phone].    We comply with applicable federal civil rights laws and [place] laws. We do not discriminate on the basis of race, color, national origin, age, disability, sex, sexual orientation, or gender identity.            Thank you!     Thank you for choosing [facility]  for your care. Our goal is always to provide you with excellent care. Hearing back from our patients is one way we can continue to improve our services. Please take a few minutes to complete the written survey that you may receive in the mail after your visit with us. Thank you!             Your Updated Medication List - Protect others around you: Learn how to safely use, store and throw away your medicines at www.disposemymeds.org.          This list is accurate as of 10/3/18  3:25 PM.  Always use your most recent med list.                   Brand Name Dispense Instructions for use Diagnosis    acetaminophen 500 MG tablet    TYLENOL     Take 500-1,000 mg by mouth every 6 hours as needed for mild pain        * albuterol 108 (90 Base) MCG/ACT inhaler    PROAIR HFA/PROVENTIL HFA/VENTOLIN HFA    3 Inhaler    Inhale 2 puffs into the lungs every 6 hours as needed for shortness of breath / dyspnea or wheezing    Mild persistent asthma without complication       * albuterol (2.5 MG/3ML) 0.083% neb solution     1 Box    Take 1 vial (2.5 mg) by nebulization every 6 hours as needed for shortness of breath / dyspnea or wheezing    Mild persistent asthma without complication       allopurinol 100 MG tablet    ZYLOPRIM    30 tablet    TAKE ONE TABLET BY MOUTH ONE TIME DAILY     Acute gouty arthritis       ARIPiprazole 10 MG tablet    ABILIFY    90 tablet    Take 1 tablet (10 mg) by mouth daily    Major depressive disorder, recurrent episode, moderate (H)       buPROPion 150 MG 24 hr tablet    WELLBUTRIN XL    30 tablet    TAKE  ONE TABLET BY MOUTH EVERY DAY IN THE MORNING    COPD exacerbation (H)       cholecalciferol 1000 UNIT tablet    vitamin D3     Take 1,000 Units by mouth daily        EPINEPHrine 0.3 MG/0.3ML injection 2-pack    EPIPEN/ADRENACLICK/or ANY BX GENERIC EQUIV    0.6 mL    Inject 0.3 mLs (0.3 mg) into the muscle once as needed for anaphylaxis    Anaphylactic reaction to bee sting, undetermined intent, subsequent encounter       furosemide 20 MG tablet    LASIX    180 tablet    TAKE TWO TABLETS BY MOUTH TWICE DAILY    Lymphedema of both lower extremities       * gabapentin 300 MG capsule    NEURONTIN    90 capsule    ONE CAPSULE BY MOUTH AT BEDTIME WITH 600 MG CAPSULE- FOR TOTAL  MG AT BEDTIME    Polyneuropathy in other diseases classified elsewhere (H)       * gabapentin 600 MG tablet    NEURONTIN    90 tablet    TAKE ONE TABLET BY MOUTH THREE TIMES DAILY    Polyneuropathy in other diseases classified elsewhere (H)       ipratropium - albuterol 0.5 mg/2.5 mg/3 mL 0.5-2.5 (3) MG/3ML neb solution    DUONEB    30 vial    Take 1 vial (3 mLs) by nebulization every 6 hours as needed for shortness of breath / dyspnea or wheezing    Chronic obstructive pulmonary disease, unspecified COPD type (H)       lamoTRIgine 200 MG tablet    LaMICtal    30 tablet    TAKE ONE TABLET BY MOUTH ONE TIME DAILY    Major depressive disorder, recurrent episode, moderate (H)       levothyroxine 150 MCG tablet    SYNTHROID/LEVOTHROID    90 tablet    Take 1 tablet (150 mcg) by mouth daily    Hypothyroidism, unspecified type       lidocaine 5 % ointment    XYLOCAINE    240 g    Apply topically 3 times daily as needed for moderate pain    Acute pain of left knee       mometasone-formoterol 200-5  "MCG/ACT oral inhaler    DULERA    13 g    Inhale 2 puffs into the lungs 2 times daily    COPD exacerbation (H), Chronic obstructive pulmonary disease, unspecified COPD type (H)       omeprazole 20 MG CR capsule    priLOSEC    180 capsule    TAKE ONE CAPSULE BY MOUTH TWICE DAILY    Gastroesophageal reflux disease without esophagitis       * order for DME      Equipment being ordered: CPAP AIRSENSE 10 5-18 CM H20 SN# [id number]   DN# [id number]        * order for DME     1 Month    Equipment being ordered: INCONTINENCE PADS  QID    Other urinary incontinence       * order for DME     60 Month    Equipment being ordered: DEPENDS SIZE LARGE    Mixed incontinence       * order for DME     2 Units    Equipment being ordered: [id number]\" x2pair   \"20-30mmHg Farrow Hybrid Liners\" x 2 pair    Peripheral edema       order for DME     1 Device    Equipment being ordered: Nebulizer    COPD exacerbation (H), Chronic obstructive pulmonary disease, unspecified COPD type (H)       order for DME     1 each    Equipment being ordered:x2 Biacare 30/40mmHg compression wraps with x2 extra prs of compression liners    Secondary lymphedema       potassium chloride SA 10 MEQ CR tablet    K-DUR/KLOR-CON M    90 tablet    TAKE ONE TABLET BY MOUTH ONE TIME DAILY    Lymphedema       pramipexole 0.125 MG tablet    MIRAPEX    60 tablet    TAKE 1-2 TABLETS BY MOUTH AT BEDTIME    Restless leg syndrome       simvastatin 20 MG tablet    ZOCOR    90 tablet    TAKE ONE TABLET BY MOUTH AT BEDTIME    Hypercholesteremia       traZODone 150 MG tablet    DESYREL    30 tablet    TAKE ONE TABLET BY MOUTH NIGHTLY AT BEDTIME    Major depressive disorder, recurrent episode, moderate (H)       warfarin 5 MG tablet    COUMADIN    180 tablet    Take 10 mg on Mon, Wed, Fri; 12.5 mg all other days or as directed by the Anticoagulation Clinic    DVT (deep venous thrombosis) (H)       * Notice:  This list has 8 medication(s) that are the same as other medications " prescribed for you. Read the directions carefully, and ask your doctor or other care provider to review them with you.

## 2018-10-03 NOTE — NURSING NOTE
"Chief Complaint   Patient presents with     RECHECK     Left hand pain, patient states it is still really tender.      Recheck Medication     Abilify- patient takes it for depression- medication was increased at the last appointment- patient states that it seems to be helping better at this time.     Initial /76  Pulse 78  Temp 99.4  F (37.4  C) (Tympanic)  Resp 18  Wt 231 lb 9.6 oz (105.1 kg)  LMP 09/27/2009  SpO2 97%  BMI 48.4 kg/m2 Estimated body mass index is 48.4 kg/(m^2) as calculated from the following:    Height as of 9/28/18: 4' 10\" (1.473 m).    Weight as of this encounter: 231 lb 9.6 oz (105.1 kg). .    Denise LUGO CMA    "

## 2018-10-03 NOTE — PROGRESS NOTES
SUBJECTIVE:   Bia Bill is a 52 year old female who presents to clinic today for the following health issues:    Chief Complaint   Patient presents with     RECHECK     Left hand pain, patient states it is still really tender.      Recheck Medication     Abilify- patient takes it for depression- medication was increased at the last appointment- patient states that it seems to be helping better at this time.       Depression Followup    Status since last visit: Improved     See PHQ-9 for current symptoms.  Other associated symptoms: None    Complicating factors:   Significant life event:  No   Current substance abuse:  None  Anxiety or Panic symptoms:  No    PHQ 7/31/2018 8/24/2018 9/23/2018   PHQ-9 Total Score 9 8 13   Q9: Suicide Ideation Not at all Not at all Not at all     F/u fo in crease in abilify  Doing much better  PHQ-9  English  PHQ-9   Any Language  Suicide Assessment Five-step Evaluation and Treatment (SAFE-T)    Amount of exercise or physical activity: 6-7 days/week for an average of 15-30 minutes    Problems taking medications regularly: No    Medication side effects: none    Diet: regular (no restrictions)      Problem list and histories reviewed & adjusted, as indicated.  Additional history: as documented    Patient Active Problem List   Diagnosis     Mild persistent asthma     Polyneuropathy in other diseases classified elsewhere (H)     DVT (deep venous thrombosis) (H)     Alcohol abuse, in remission     SECONDARY POLYCYTHEMIA     Chondromalacia of patella     Sensorineural hearing loss, asymmetrical     Developmental reading disorder     Esophageal reflux     Hypothyroidism     Fatty liver     GILES (Obstructive Sleep Apnea)-Moderate (AHI 16)     RLS (restless legs syndrome)     Smoker     Erythrocytosis     Moderate mixed bipolar I disorder (H)     Personality disorder, depressive     COPD (chronic obstructive pulmonary disease) (H)     Current use of long term anticoagulation     Rosacea      Health Care Home     DDD (degenerative disc disease), cervical     Benign neoplasm of colon (POLYPOSIS)     Stroke (H)     Somatization disorder     Sebaceous cyst of right axilla     HTN, goal below 140/90     Left leg pain     Vitamin D deficiency     Long term current use of anticoagulant therapy     Morbid obesity (H)     PVD (peripheral vascular disease) with claudication (H)     Cyst of left ovary     Morbid obesity with alveolar hypoventilation (H)     GI bleed     Severe episode of recurrent major depressive disorder (H)     Conductive hearing loss of left ear with restricted hearing of right ear     Sensorineural hearing loss (SNHL) of right ear with restricted hearing of left ear     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     Past Surgical History:   Procedure Laterality Date     BILIARY STENT SINGLE USE COV      3 stints in left leg     BONE MARROW BIOPSY, BONE SPECIMEN, NEEDLE/TROCAR N/A 11/17/2014    Procedure: BIOPSY BONE MARROW;  Surgeon: Hay Aaron MD;  Location: [facility]     D & C  10/26/09    with uterine ablation     ESOPHAGOSCOPY, GASTROSCOPY, DUODENOSCOPY (EGD), COMBINED  4/21/2014    Procedure: Gastroscopy;  Surgeon: Moris Thomas MD;  Location: [facility]     HYSTERECTOMY, PAP NO LONGER INDICATED  1-4-2010     LITHOTRIPSY  2004    Lithotrypsy       Social History   Substance Use Topics     Smoking status: Current Every Day Smoker     Packs/day: 0.50     Years: 34.00     Types: Cigarettes     Smokeless tobacco: Never Used      Comment: started at age 11.  Quit during pregnancyX4.  Quit 3 months ago.      Alcohol use No      Comment: quit 1995     Family History   Problem Relation Age of Onset     Hypertension Mother      Diabetes Mother      Blood Disease Mother      HEART DISEASE Mother      chf     Cerebrovascular Disease Mother      Hypertension Father      Cancer Father      lung cancer     Allergies Sister      Diabetes  Sister      Depression Sister      Hypertension Sister            Reviewed and updated as needed this visit by clinical staff  Tobacco  Allergies  Meds  Med Hx  Surg Hx  Fam Hx  Soc Hx      Reviewed and updated as needed this visit by Provider         ROS:  Constitutional, HEENT, cardiovascular, pulmonary, gi and gu systems are negative, except as otherwise noted.    OBJECTIVE:     /76  Pulse 78  Temp 99.4  F (37.4  C) (Tympanic)  Resp 18  Wt 231 lb 9.6 oz (105.1 kg)  LMP 09/27/2009  SpO2 97%  BMI 48.4 kg/m2  Body mass index is 48.4 kg/(m^2).  GENERAL: healthy, alert and no distress  NECK: no adenopathy, no asymmetry, masses, or scars and thyroid normal to palpation  RESP: lungs clear to auscultation - no rales, rhonchi or wheezes  CV: regular rate and rhythm, normal S1 S2, no S3 or S4, no murmur, click or rub, no peripheral edema and peripheral pulses strong  ABDOMEN: soft, nontender, no hepatosplenomegaly, no masses and bowel sounds normal  MS: no gross musculoskeletal defects noted, no edema    Diagnostic Test Results:  none     ASSESSMENT/PLAN:       1. Deep vein thrombosis (DVT) of proximal lower extremity, unspecified chronicity, unspecified laterality (H)  Stable on coumadin    2. Erythrocytosis  satble on coumadin    3. 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    - Uric acid    4. Morbid obesity with alveolar hypoventilation (H)  Good control.  Continue currant medications, continue to monito    Bipolar  Much improved on increased dose of abilify        Kd Leong MD  [facility]

## 2018-10-03 NOTE — LETTER
October 10, 2018      Biairving Bill  [address]  [place] [address]        Dear ,    We are writing to inform you of your test results.    Uric acid level is higher.    Increase allopurinol to 300mg daily. New prescription sent in.  Recheck lab in 2 months.    Resulted Orders   Uric acid   Result Value Ref Range    Uric Acid 6.8 (H) 2.6 - 6.0 mg/dL       If you have any questions or concerns, please call the clinic at the number listed above.       Sincerely,        Kd Leong MD

## 2018-10-05 ENCOUNTER — TELEPHONE (OUTPATIENT)
Dept: FAMILY MEDICINE | Facility: CLINIC | Age: 53
End: 2018-10-05

## 2018-10-05 ENCOUNTER — OFFICE VISIT (OUTPATIENT)
Dept: PSYCHOLOGY | Facility: CLINIC | Age: 53
End: 2018-10-05
Payer: COMMERCIAL

## 2018-10-05 ENCOUNTER — ANTICOAGULATION THERAPY VISIT (OUTPATIENT)
Dept: ANTICOAGULATION | Facility: CLINIC | Age: 53
End: 2018-10-05

## 2018-10-05 DIAGNOSIS — F31.62 MODERATE MIXED BIPOLAR I DISORDER (H): Primary | ICD-10-CM

## 2018-10-05 DIAGNOSIS — Z79.01 LONG TERM CURRENT USE OF ANTICOAGULANT THERAPY: ICD-10-CM

## 2018-10-05 DIAGNOSIS — I73.9 PVD (PERIPHERAL VASCULAR DISEASE) WITH CLAUDICATION (H): ICD-10-CM

## 2018-10-05 DIAGNOSIS — I82.409 DVT (DEEP VENOUS THROMBOSIS) (H): Primary | ICD-10-CM

## 2018-10-05 DIAGNOSIS — Z86.73 HISTORY OF STROKE: ICD-10-CM

## 2018-10-05 DIAGNOSIS — D75.1 ERYTHROCYTOSIS: Chronic | ICD-10-CM

## 2018-10-05 DIAGNOSIS — I82.4Y9 DEEP VEIN THROMBOSIS (DVT) OF PROXIMAL LOWER EXTREMITY, UNSPECIFIED CHRONICITY, UNSPECIFIED LATERALITY (H): ICD-10-CM

## 2018-10-05 DIAGNOSIS — I82.409 DVT (DEEP VENOUS THROMBOSIS) (H): ICD-10-CM

## 2018-10-05 LAB
HCT VFR BLD AUTO: 58.5 % (ref 35–47)
HGB BLD-MCNC: 18.3 G/DL (ref 11.7–15.7)
INR PPP: 1.82 (ref 0.86–1.14)

## 2018-10-05 PROCEDURE — 85018 HEMOGLOBIN: CPT | Performed by: FAMILY MEDICINE

## 2018-10-05 PROCEDURE — 99207 ZZC NO CHARGE NURSE ONLY: CPT

## 2018-10-05 PROCEDURE — 36415 COLL VENOUS BLD VENIPUNCTURE: CPT | Performed by: FAMILY MEDICINE

## 2018-10-05 PROCEDURE — 85014 HEMATOCRIT: CPT | Performed by: FAMILY MEDICINE

## 2018-10-05 PROCEDURE — 85610 PROTHROMBIN TIME: CPT | Performed by: FAMILY MEDICINE

## 2018-10-05 PROCEDURE — 90834 PSYTX W PT 45 MINUTES: CPT | Performed by: PSYCHOLOGIST

## 2018-10-05 NOTE — TELEPHONE ENCOUNTER
Received call from Lab at [facility].  Plt 83,000. HGB 18.3 Hematocrit 58.5  Patient currently has DVT. Followed by Dr Watts.  Princess ATKINSON/RADHA

## 2018-10-05 NOTE — PROGRESS NOTES
ANTICOAGULATION FOLLOW-UP CLINIC VISIT    Patient Name:  Bia Bill  Date:  10/5/2018  Contact Type:  Telephone/ spoke with Norah on phone    SUBJECTIVE:     Patient Findings     Positives Change in medications (abilify increased on 9-12), Unexplained INR or factor level change    Comments Patient denies any missed doses and took warfarin as advised. No increase in greens. No other medication or OTC changes. Writer will give a one time dose increase today, resume usual dosing and recheck at lab in 2 weeks. No signs of clot mentioned.           OBJECTIVE    INR   Date Value Ref Range Status   10/05/2018 1.82 (H) 0.86 - 1.14 Final     Comment:     Special tube used to correct for high hematocrit       ASSESSMENT / PLAN  INR assessment SUB    Recheck INR In: 2 WEEKS    INR Location Clinic lab     Anticoagulation Summary as of 10/5/2018     INR goal 2.0-2.5   Today's INR 1.82!   Warfarin maintenance plan 10 mg (5 mg x 2) on Mon, Wed, Fri; 12.5 mg (5 mg x 2.5) all other days   Full warfarin instructions 10/5: 12.5 mg; Otherwise 10 mg on Mon, Wed, Fri; 12.5 mg all other days   Weekly warfarin total 80 mg   Plan last modified Angelina Nj, RN (7/12/2018)   Next INR check 10/19/2018   Priority INR   Target end date Indefinite    Indications   Long term current use of anticoagulant therapy [Z79.01]  DVT (deep venous thrombosis) (H) [I82.409]  PVD (peripheral vascular disease) with claudication (H) [I73.9]         Anticoagulation Episode Summary     INR check location     Preferred lab     Send INR reminders to [facility]    Comments * lab draw due to elevated hematocrit (fingerstick meters don't work). LEFT LE. STENT x 3 LEFT UPPER LEG. Previous arterial clot. New goal range 2-2.5 starting 11/6/17.      Anticoagulation Care Providers     Provider Role Specialty Phone number    Kd Leong MD [facility] [phone]            See the Encounter Report to view Anticoagulation  Flowsheet and Dosing Calendar (Go to Encounters tab in chart review, and find the Anticoagulation Therapy Visit)        Mireille Torres RN

## 2018-10-05 NOTE — MR AVS SNAPSHOT
"                  MRN:[id number]                      After Visit Summary   10/5/2018    Bia Bill    MRN: [id number]           Visit Information        Provider Department      10/5/2018 9:30 AM Arturo Kd CHAWLA [facility]      Your next 10 appointments already scheduled     Oct 19, 2018  8:30 AM CDT   Return Visit with Kd Morris   [facility] ([facility])    [address] [address]   [phone]            Nov 02, 2018  1:00 PM CDT   Return Visit with Kd Morris   [facility] ([facility])    [address] [address]   [phone]            Mar 29, 2019  1:10 PM CDT   LAB with [facility] ([facility])    [address] [address]   [phone]           Please do not eat 10-12 hours before your appointment if you are coming in fasting for labs on lipids, cholesterol, or glucose (sugar). This does not apply to pregnant women. Water, hot tea and black coffee (with nothing added) are okay. Do not drink other fluids, diet soda or chew gum.            Mar 29, 2019  2:00 PM CDT   Return Visit with Joshua Watts MD   [facility] ([facility])    [facility]  [address]  [place] [address]   [phone]              MyChart Information     [employer] lets you send messages to your doctor, view your test results, renew your prescriptions, schedule appointments and more. To sign up, go to [email or web address] . Click on \"Log in\" on the left side of the screen, which will take you to the Welcome page. Then click on \"Sign up Now\" on the right side of the page.     You will be asked to enter the access code listed below, as well as some personal information. Please follow the directions to create your username and password.     Your access code is: " [email or web address]  Expires: 12/10/2018 10:27 AM     Your access code will  in 90 days. If you need help or a new code, please call your [facility] clinic or [phone].        Care EveryWhere ID     This is your Care EveryWhere ID. This could be used by other organizations to access your [facility] medical records  [id number]        Equal Access to Services     ANTIONETTE IRBY : Hadii jose Clark, waemilia rust, qaharrisonta kaalmada obed, nitin roberts . So [facility] [phone].    ATENCIÓN: Si habla español, tiene a terry disposición servicios gratuitos de asistencia lingüística. Llame al [phone].    We comply with applicable federal civil rights laws and [place] laws. We do not discriminate on the basis of race, color, national origin, age, disability, sex, sexual orientation, or gender identity.

## 2018-10-05 NOTE — MR AVS SNAPSHOT
Bia Bill   10/5/2018   Anticoagulation Therapy Visit    Description:  52 year old female   Provider:  Mireille Torres, RN   Department:  [facility]           INR as of 10/5/2018     Today's INR 1.82!      Anticoagulation Summary as of 10/5/2018     INR goal 2.0-2.5   Today's INR 1.82!   Full warfarin instructions 10/5: 12.5 mg; Otherwise 10 mg on Mon, Wed, Fri; 12.5 mg all other days   Next INR check 10/19/2018    Indications   Long term current use of anticoagulant therapy [Z79.01]  DVT (deep venous thrombosis) (H) [I82.409]  PVD (peripheral vascular disease) with claudication (H) [I73.9]         October 2018 Details    Sun Mon Tue Wed Thu Fri Sat      1               2               3               4               5      12.5 mg   See details      6      12.5 mg           7      12.5 mg         8      10 mg         9      12.5 mg         10      10 mg         11      12.5 mg         12      10 mg         13      12.5 mg           14      12.5 mg         15      10 mg         16      12.5 mg         17      10 mg         18      12.5 mg         19            20                 21               22               23               24               25               26               27                 28               29               30               31                   Date Details   10/05 This INR check       Date of next INR:  10/19/2018         How to take your warfarin dose     To take:  10 mg Take 2 of the 5 mg tablets.    To take:  12.5 mg Take 2.5 of the 5 mg tablets.

## 2018-10-05 NOTE — PROGRESS NOTES
New lab INR orders put in.     Reina Forrest, RN, BSN, PHN  Anticoagulation Clinic   [phone]

## 2018-10-05 NOTE — MR AVS SNAPSHOT
Biasam Bill   10/5/2018   Anticoagulation Therapy Visit    Description:  52 year old female   Provider:  Reina Forrest, RN   Department:  [facility]           INR as of 10/5/2018     Today's INR       Anticoagulation Summary as of 10/5/2018     INR goal 2.0-2.5   Today's INR    Next INR check     Indications   Long term current use of anticoagulant therapy [Z79.01]  DVT (deep venous thrombosis) (H) [I82.409]  PVD (peripheral vascular disease) with claudication (H) [I73.9]         September 2018 Details    Sun Mon Tue Wed Thu Fri Sat           1                 2               3               4      12.5 mg   See details      5      10 mg         6      12.5 mg         7      10 mg         8      12.5 mg           9      12.5 mg         10      10 mg         11      12.5 mg         12      10 mg         13      12.5 mg         14      10 mg         15      12.5 mg           16      12.5 mg         17      10 mg         18      12.5 mg         19      10 mg         20      12.5 mg         21      10 mg         22      12.5 mg           23      12.5 mg         24      10 mg         25      12.5 mg         26      10 mg         27      12.5 mg         28      10 mg         29      12.5 mg           30      12.5 mg                Date Details   09/04 This INR check               How to take your warfarin dose     To take:  10 mg Take 2 of the 5 mg tablets.    To take:  12.5 mg Take 2.5 of the 5 mg tablets.           October 2018 Details    Sun Mon Tue Wed Thu Fri Sat      1      10 mg         2      12.5 mg         3      10 mg         4      12.5 mg         5            6                 7               8               9               10               11               12               13                 14               15               16               17               18               19               20                 21               22               23               24               25                26               27                 28               29               30               31                   Date Details   No additional details    Date of next INR:  10/5/2018         How to take your warfarin dose     To take:  10 mg Take 2 of the 5 mg tablets.    To take:  12.5 mg Take 2.5 of the 5 mg tablets.

## 2018-10-05 NOTE — PROGRESS NOTES
New lab INR orders put in.     Reina Forrest, RN, BSN, PHN  Anticoagulation Clinic   [phone]

## 2018-10-08 DIAGNOSIS — M10.9 ACUTE GOUTY ARTHRITIS: ICD-10-CM

## 2018-10-08 RX ORDER — ALLOPURINOL 300 MG/1
300 TABLET ORAL DAILY
Qty: 90 TABLET | Refills: 0 | Status: SHIPPED | OUTPATIENT
Start: 2018-10-08 | End: 2019-01-14

## 2018-10-08 RX ORDER — ALLOPURINOL 100 MG/1
TABLET ORAL
Qty: 30 TABLET | Refills: 0 | OUTPATIENT
Start: 2018-10-08

## 2018-10-08 ASSESSMENT — ANXIETY QUESTIONNAIRES
GAD7 TOTAL SCORE: 5
6. BECOMING EASILY ANNOYED OR IRRITABLE: MORE THAN HALF THE DAYS
4. TROUBLE RELAXING: SEVERAL DAYS
2. NOT BEING ABLE TO STOP OR CONTROL WORRYING: NOT AT ALL
1. FEELING NERVOUS, ANXIOUS, OR ON EDGE: SEVERAL DAYS
5. BEING SO RESTLESS THAT IT IS HARD TO SIT STILL: SEVERAL DAYS
3. WORRYING TOO MUCH ABOUT DIFFERENT THINGS: NOT AT ALL
7. FEELING AFRAID AS IF SOMETHING AWFUL MIGHT HAPPEN: NOT AT ALL

## 2018-10-08 NOTE — TELEPHONE ENCOUNTER
"Requested Prescriptions   Pending Prescriptions Disp Refills     allopurinol (ZYLOPRIM) 100 MG tablet [Pharmacy Med Name: ALLOPURINOL 100 MG  TAB NORT] 30 tablet 0     Sig: TAKE ONE TABLET BY MOUTH ONE TIME DAILY    Gout Agents Protocol Failed    10/8/2018 10:49 AM       Failed - Has Uric Acid on file in past 12 months and value is less than 6    Recent Labs   Lab Test  10/03/18   1342   URIC  6.8*     If level is 6mg/dL or greater, ok to refill one time and refer to provider.          Passed - CBC on file in past 12 months    Recent Labs   Lab Test  10/05/18   1029   08/25/18   2242   WBC   --    --   6.4   RBC   --    --   6.71*   HGB  18.3*   < >  18.7*   HCT  58.5*   < >  59.5*   PLT   --    --   125*    < > = values in this interval not displayed.       For [facility] ONLY: [id number] = WBC, WAWX790 = RBC         Passed - ALT on file in past 12 months    Recent Labs   Lab Test  05/16/18   1043   ALT  34            Passed - Recent (12 mo) or future (30 days) visit within the authorizing provider's specialty    Patient had office visit in the last 12 months or has a visit in the next 30 days with authorizing provider or within the authorizing provider's specialty.  See \"Patient Info\" tab in inbasket, or \"Choose Columns\" in Meds & Orders section of the refill encounter.           Passed - Patient is age 18 or older       Passed - No active pregnancy on record       Passed - Normal serum creatinine on file in the past 12 months    Recent Labs   Lab Test  08/25/18   2242   12/12/11   1911   CR  0.77   < >   --    CREAT   --    --   0.7    < > = values in this interval not displayed.            Passed - No positive pregnancy test in past year        Last Written Prescription Date:  10/8/18  Last Fill Quantity: 90,  # refills: 0   Last office visit: 10/3/2018 with prescribing provider:  Dang   Future Office Visit:   Next 5 appointments (look out 90 days)     Oct 19, 2018  8:30 AM CDT   Return Visit with Kd Morris "   [facility] ([facility])    [address] [address]   [phone]            Nov 02, 2018  1:00 PM CDT   Return Visit with Kd Morris   [facility] ([facility])    [address] [address]   [phone]

## 2018-10-09 ASSESSMENT — ANXIETY QUESTIONNAIRES: GAD7 TOTAL SCORE: 5

## 2018-10-09 ASSESSMENT — PATIENT HEALTH QUESTIONNAIRE - PHQ9: SUM OF ALL RESPONSES TO PHQ QUESTIONS 1-9: 12

## 2018-10-09 NOTE — PROGRESS NOTES
Progress Note  Disclaimer: This note consists of symbols derived from keyboarding, dictation and/or voice recognition software. As a result, there may be errors in the script that have gone undetected. Please consider this when interpreting information found in this chart.    Client Name: Bia Bill  Date: 10/5/2018         Service Type: Individual      Session Start Time: 9:30 AM session End Time: 10:15 AM    Session Length: 45     Session #: 37     Attendees: Client attended alone    Treatment Plan Last Reviewed: 4/3/2018  PHQ-9 / CHIN-7 : See flowsheet     DATA   Client reports she is feeling more depressed due to the weather, feeling more anxious and restless.  She is having transportation difficulties.  She is addressing her anxiety and depression by limiting social media.  Primary physician up at her [facility] 3 weeks ago and she feels it is working well for her.  Finds that she is able to work with her depression more.  She still gets occasional alcohol drinks even though her last drink was 15 years ago.  When asked why she does not drink she stated that she wants to be there for her grandson.  She continues to attempt to decrease and eliminate her smoking   Progress Since Last Session (Related to Symptoms / Goals / Homework):   Symptoms: Stable    Homework: Achieved / completed to satisfaction      Episode of Care Goals: Satisfactory progress - ACTION (Actively working towards change); Intervened by reinforcing change plan / affirming steps taken     Current / Ongoing Stressors and Concerns:   Recurrent depression  family relational problems, medical problems, chronic pain, trauma history      Treatment Objective(s) Addressed in This Session:   Increase interest, engagement, and pleasure in doing things  Decrease frequency and intensity of feeling down, depressed, hopeless       Intervention:   CBT: Behavioral activation supported client's effort at  establishing maintaining healthy boundaries with her family.  Encouraged client to stop enabling individual's behavior.  ASSESSMENT: Current Emotional / Mental Status (status of significant symptoms):   Risk status (Self / Other harm or suicidal ideation)   Client denies current fears or concerns for personal safety.   Client denies current or recent suicidal ideation or behaviors.   Client denies current or recent homicidal ideation or behaviors.   Client denies current or recent self injurious behavior or ideation.   Client denies other safety concerns.   A safety and risk management plan has not been developed at this time, however client was given the after-hours number / 911 should there be a change in any of these risk factors.     Appearance:   Appropriate    Eye Contact:   Good    Psychomotor Behavior: Normal    Attitude:   Cooperative    Orientation:   All   Speech    Rate / Production: Normal     Volume:  Normal    Mood:    Normal   Affect:    Appropriate    Thought Content:  Clear    Thought Form:  Coherent  Logical    Insight:    Good      Medication Review:   No changes to current psychiatric medication(s)     Medication Compliance:   Yes     Changes in Health Issues:   None reported     Chemical Use Review:   Substance Use: Chemical use reviewed, no active concerns identified      Tobacco Use: Client reports no smoking for the last week     Collateral Reports Completed:   Not Applicable    PLAN: (Client Tasks / Therapist Tasks / Other)  Client to continue With her self-care routine and maintenance of boundaries.   Maintain medication compliance and contact with psychiatry.  Take care of herself physically.  Practice one to 2 grounding techniques each day.   Client to maintain sobriety and contact with supportive sober others.  Kd Morris                                                         ________________________________________________________________________    Treatment Plan    Client's  Name: Bia Bill  YOB: 1965    Date: 10/24/2017      DSM5 Diagnoses: (Sustained by DSM5 Criteria Listed Above)  Diagnoses: 296.40 Bipolar I Disorder Current or Most Recent Episode Manic, Unspecified  Psychosocial & Contextual Factors: financial difficulties, chronic pain, roommate issues  WHODAS 2.0 (12 item)               This questionnaire asks about difficulties due to health conditions. Health conditions             include disease or illnesses, other health problems that may be short or long lasting,             injuries, mental health or emotional problems, and problems with alcohol or drugs.                         Think back over the past 30 days and answer these questions, thinking about how much             difficulty you had doing the following activities. For each question, please [place] only             one response.      S1  Standing for long periods such as 30 minutes?  Mild =           2    S2  Taking care of household responsibilities?  Moderate =   3    S3  Learning a new task, for example, learning how to get to a new place?  Mild =           2    S4  How much of a problem do you have joining community activities (for example, festivals, Baptism or other activities) in the same way as anyone else can?  Moderate =   3    S5  How much have you been emotionally affected by your health problems?  Mild =           2        In the past 30 days, how much difficulty did you have in:    S6  Concentrating on doing something for ten minutes?  Mild =           2    S7  Walking a long distance such as a kilometer (or equivalent)?  Severe =       4    S8  Washing your whole body?  None =         1    S9  Getting dressed?  None =         1    S10  Dealing with people you do not know?  Moderate =   3    S11  Maintaining a friendship?  Severe =       4    S12  Your day to day work?  Moderate =   3       H1  Overall, in the past 30 days, how many days were these difficulties present?  Record  number of days seven    H2  In the past 30 days, for how many days were you totally unable to carry out your usual activities or work because of any health condition?  Record number of days  seven    H3  In the past 30 days, not counting the days that you were totally unable, for how many days did you cut back or reduce your usual activities or work because of any health condition?  Record number of days five                   Referral / Collaboration:  Referral to another professional/service is not indicated at this time..    Anticipated number of session or this episode of care: 15    Goals  1. Education- the Biopsychosocial model of depression  a. Client will be able to describe how depression is effecting them physically, emotionally and socially  2. Behavioral Activation  a. Client will learn to assess their depression on a day to day basis  b. Client will Identify two forms of exercise/activity and engage in them 3 times per week  c. Client will Identify 3 things that make them laugh, and engage in these 5 times per week.  d. Client will Identify 1-2Creative activities or hobbies  and engage in them 2 times per week  e. Client will identify music, movies, books that make them feel good and use them 3-4 times per week  3. Self-care  a. Client will identify 5 things they can do just for themselves  b. Client will take time for quiet, reflection, meditation 5 times per week  c. Client will Learn to set boundaries when appropriate  d. Client will Identify 2 individuals they can call on for support, distraction  4. Assessment of progress  a. Client will engage in assessment of their progress on a regular basis    Bipolar Disorder  Treatment plan:  1. Education- the Biopsychosocial model of Bipolar Disorder    a. Client will be able to describe in general terms what Bipolar Disorder  is and is not  b. Client will be able to describe how Bipolar Disorder  has affected their life in at least two different areas, such  as school or work and Home/relationships  c. Clients parents/guardians or significant others will be provided information on what Bipolar Disorder  is and the ways it can affect relationships and be encouraged to be a part of clients treatment team.  2. Medication  a. Client will participate in medication evaluation for Bipolar Disorder  symptoms and follow medication recommendations.   3. Identification and management of triggers  a. Client and therapist will examine patient s history to determine if there are predictable triggers for manic or depressive episodes (e.g. boredom, anger, family stress)  b. Client and therapist will develop means of diffusing these triggers (e.g. relaxation strategies, boundary setting, anger management)  4. Comorbid conditions   a. Client and therapist will asses for comorbid conditions (e.g. anxiety, depression, substance use). and add additional items to treatment plan as needed  5. Self-care  a. Client will identify 3 things they can do just for themselves  b. Client will take time for quiet, reflection, meditation 3 times per week  c. Client will Learn to set boundaries when appropriate  d. Client will Identify 2 individuals they can call on for support, distraction  5. Behavioral Activation  a. Client will Identify two forms of exercise/activity and engage in them 3 times per week  b. Client will Identify 2 things that make them laugh, and engage in these 3 times per week.  c. Client will Identify 2 Creative activities or hobbies  and engage in them 2 times per week  d. Client will identify music, movies, books that make them feel good and use them 3 times per week  6. Assessment of progress  a. Client will engage in assessment of their progress on a regular basis    Client has reviewed and agreed to the above plan.      Kd Morris  4/3/2018

## 2018-10-16 DIAGNOSIS — G63 POLYNEUROPATHY IN OTHER DISEASES CLASSIFIED ELSEWHERE (H): Chronic | ICD-10-CM

## 2018-10-16 RX ORDER — GABAPENTIN 600 MG/1
TABLET ORAL
Qty: 90 TABLET | Refills: 0 | Status: SHIPPED | OUTPATIENT
Start: 2018-10-16 | End: 2018-11-18

## 2018-10-16 NOTE — TELEPHONE ENCOUNTER
GABAPENTIN 600 MG TAB ASCE        Last Written Prescription Date:  5/31/18  Last Fill Quantity: 90,   # refills: 2  Last Office Visit: 10/3/18  Future Office visit:    Next 5 appointments (look out 90 days)     Oct 19, 2018  8:30 AM CDT   [facility] with [facility] ([facility])    [address] [place] [address]   [phone]            Nov 02, 2018  1:00 PM CDT   Return Visit with [facility] ([facility])    [address] [facility] [address]   [phone]                   Routing refill request to provider for review/approval because:  Drug not on the [facility], [facility] or [facility] refill protocol or controlled substance

## 2018-10-16 NOTE — TELEPHONE ENCOUNTER
Routing refill request to provider for review/approval because:  Drug not on the [facility] refill protocol Madeleine Mesa RN

## 2018-10-19 ENCOUNTER — ANTICOAGULATION THERAPY VISIT (OUTPATIENT)
Dept: ANTICOAGULATION | Facility: CLINIC | Age: 53
End: 2018-10-19

## 2018-10-19 ENCOUNTER — OFFICE VISIT (OUTPATIENT)
Dept: PSYCHOLOGY | Facility: CLINIC | Age: 53
End: 2018-10-19
Payer: COMMERCIAL

## 2018-10-19 DIAGNOSIS — I82.409 DVT (DEEP VENOUS THROMBOSIS) (H): ICD-10-CM

## 2018-10-19 DIAGNOSIS — D75.1 ERYTHROCYTOSIS: Chronic | ICD-10-CM

## 2018-10-19 DIAGNOSIS — F31.62 MODERATE MIXED BIPOLAR I DISORDER (H): Primary | ICD-10-CM

## 2018-10-19 DIAGNOSIS — Z79.01 LONG TERM CURRENT USE OF ANTICOAGULANT THERAPY: ICD-10-CM

## 2018-10-19 DIAGNOSIS — Z86.73 HISTORY OF STROKE: ICD-10-CM

## 2018-10-19 DIAGNOSIS — I73.9 PVD (PERIPHERAL VASCULAR DISEASE) WITH CLAUDICATION (H): ICD-10-CM

## 2018-10-19 LAB — INR PPP: 2.37 (ref 0.86–1.14)

## 2018-10-19 PROCEDURE — 90834 PSYTX W PT 45 MINUTES: CPT | Performed by: PSYCHOLOGIST

## 2018-10-19 PROCEDURE — 85014 HEMATOCRIT: CPT | Performed by: FAMILY MEDICINE

## 2018-10-19 PROCEDURE — 99207 ZZC NO CHARGE NURSE ONLY: CPT

## 2018-10-19 PROCEDURE — 85018 HEMOGLOBIN: CPT

## 2018-10-19 PROCEDURE — 85610 PROTHROMBIN TIME: CPT | Performed by: FAMILY MEDICINE

## 2018-10-19 PROCEDURE — 36415 COLL VENOUS BLD VENIPUNCTURE: CPT | Performed by: FAMILY MEDICINE

## 2018-10-19 ASSESSMENT — ANXIETY QUESTIONNAIRES
2. NOT BEING ABLE TO STOP OR CONTROL WORRYING: SEVERAL DAYS
7. FEELING AFRAID AS IF SOMETHING AWFUL MIGHT HAPPEN: NOT AT ALL
4. TROUBLE RELAXING: SEVERAL DAYS
GAD7 TOTAL SCORE: 8
6. BECOMING EASILY ANNOYED OR IRRITABLE: MORE THAN HALF THE DAYS
1. FEELING NERVOUS, ANXIOUS, OR ON EDGE: MORE THAN HALF THE DAYS
5. BEING SO RESTLESS THAT IT IS HARD TO SIT STILL: SEVERAL DAYS
3. WORRYING TOO MUCH ABOUT DIFFERENT THINGS: SEVERAL DAYS

## 2018-10-19 NOTE — MR AVS SNAPSHOT
MRN:[id number]                      After Visit Summary   10/19/2018    Bia Bill    MRN: [id number]           Visit Information        Provider Department      10/19/2018 8:30 AM Arturo Kd CHAWLA [facility] Generic      Your next 10 appointments already scheduled     Nov 02, 2018  1:00 PM CDT   Return Visit with Kd Morris   [facility] ([facility])    [address] [address]   [phone]            Nov 14, 2018  9:30 AM CST   Return Visit with Kd Morris   [facility] ([facility])    [address] [address]   [phone]            Mar 29, 2019  1:10 PM CDT   LAB with [facility] ([facility])    [address] [address]   [phone]           Please do not eat 10-12 hours before your appointment if you are coming in fasting for labs on lipids, cholesterol, or glucose (sugar). This does not apply to pregnant women. Water, hot tea and black coffee (with nothing added) are okay. Do not drink other fluids, diet soda or chew gum.            Mar 29, 2019  2:00 PM CDT   Return Visit with Joshua Watts MD   [facility] ([facility])    [facility]  [address] [place] [address]  [facility] [address]   [phone]              Care EveryWhere ID     This is your Care EveryWhere ID. This could be used by other organizations to access your [place] medical records  [id number]        Equal Access to Services     ANTIONETTE IRBY : Hadii aad ku hadasho Soomaali, waaxda luqadaha, qaybta kaalmada ademilleryafrancis, nitin craig. So [facility] [phone].    ATENCIÓN: Si habla español, tiene a terry disposición servicios gratuitos de asistencia lingüística. Carlosame al [phone].    We comply with applicable federal civil rights laws and [place]  laws. We do not discriminate on the basis of race, color, national origin, age, disability, sex, sexual orientation, or gender identity.

## 2018-10-19 NOTE — MR AVS SNAPSHOT
Bia S Bill   10/19/2018   Anticoagulation Therapy Visit    Description:  52 year old female   Provider:  Reina Forrest, RN   Department:  [facility]           INR as of 10/19/2018     Today's INR 2.37      Anticoagulation Summary as of 10/19/2018     INR goal 2.0-2.5   Today's INR 2.37   Full warfarin instructions 10 mg on Mon, Wed, Fri; 12.5 mg all other days   Next INR check 11/2/2018    Indications   Long term current use of anticoagulant therapy [Z79.01]  DVT (deep venous thrombosis) (H) [I82.409]  PVD (peripheral vascular disease) with claudication (H) [I73.9]         October 2018 Details    Sun Mon Tue Wed Thu Fri Sat      1               2               3               4               5               6                 7               8               9               10               11               12               13                 14               15               16               17               18               19      10 mg   See details      20      12.5 mg           21      12.5 mg         22      10 mg         23      12.5 mg         24      10 mg         25      12.5 mg         26      10 mg         27      12.5 mg           28      12.5 mg         29      10 mg         30      12.5 mg         31      10 mg             Date Details   10/19 This INR check               How to take your warfarin dose     To take:  10 mg Take 2 of the 5 mg tablets.    To take:  12.5 mg Take 2.5 of the 5 mg tablets.           November 2018 Details    Sun Mon Tue Wed Thu Fri Sat         1      12.5 mg         2            3                 4               5               6               7               8               9               10                 11               12               13               14               15               16               17                 18               19               20               21               22               23               24                 25                26               27               28               29               30                 Date Details   No additional details    Date of next INR:  11/2/2018         How to take your warfarin dose     To take:  10 mg Take 2 of the 5 mg tablets.    To take:  12.5 mg Take 2.5 of the 5 mg tablets.

## 2018-10-19 NOTE — PROGRESS NOTES
ANTICOAGULATION FOLLOW-UP CLINIC VISIT    Patient Name:  Bia Bill  Date:  10/19/2018  Contact Type:  Telephone    SUBJECTIVE:     Patient Findings     Positives Missed doses    Comments Patient states she did miss a dose on Monday. The current INR is therapeutic but may have been elevated if the Monday dose had been taking. Writer instructed patient to still continue on maintenance dose as her INR tends to fluctuate. Recheck in 2 weeks.            OBJECTIVE    INR   Date Value Ref Range Status   10/19/2018 2.37 (H) 0.86 - 1.14 Final     Comment:     Special tube used to correct for high hematocrit       ASSESSMENT / PLAN  INR assessment THER    Recheck INR In: 2 WEEKS    INR Location Outside lab      Anticoagulation Summary as of 10/19/2018     INR goal 2.0-2.5   Today's INR 2.37   Warfarin maintenance plan 10 mg (5 mg x 2) on Mon, Wed, Fri; 12.5 mg (5 mg x 2.5) all other days   Full warfarin instructions 10 mg on Mon, Wed, Fri; 12.5 mg all other days   Weekly warfarin total 80 mg   Plan last modified Angelina Nj RN (7/12/2018)   Next INR check 11/2/2018   Priority INR   Target end date Indefinite    Indications   Long term current use of anticoagulant therapy [Z79.01]  DVT (deep venous thrombosis) (H) [I82.409]  PVD (peripheral vascular disease) with claudication (H) [I73.9]         Anticoagulation Episode Summary     INR check location     Preferred lab     Send INR reminders to [place] PHONE ANTICOAG POOL    Comments * lab draw due to elevated hematocrit (fingerstick meters don't work). LEFT LE. STENT x 3 LEFT UPPER LEG. Previous arterial clot. New goal range 2-2.5 starting 11/6/17.      Anticoagulation Care Providers     Provider Role Specialty Phone number    Kd Leong MD [facility] [phone]            See the Encounter Report to view Anticoagulation Flowsheet and Dosing Calendar (Go to Encounters tab in chart review, and find the Anticoagulation Therapy  Visit)        Reina Forrest RN

## 2018-10-19 NOTE — PROGRESS NOTES
Progress Note  Disclaimer: This note consists of symbols derived from keyboarding, dictation and/or voice recognition software. As a result, there may be errors in the script that have gone undetected. Please consider this when interpreting information found in this chart.    Client Name: Bia Bill  Date: 10/19/2018         Service Type: Individual      Session Start Time: 8:30 AM session End Time: 9:15 AM M    Session Length: 45     Session #: 38     Attendees: Client attended alone    Treatment Plan Last Reviewed: 10/19/2018  PHQ-9 / CHIN-7 : See flowsheet     DATA   Client reports she feeling a little less depressed recently.  Her daughter is still trying to convince her to move to [place] but she is holding the line and maintaining her boundaries.  She has been doing a little more for herself, small shopping trips.   Progress Since Last Session (Related to Symptoms / Goals / Homework):   Symptoms: Stable    Homework: Achieved / completed to satisfaction      Episode of Care Goals: Satisfactory progress - ACTION (Actively working towards change); Intervened by reinforcing change plan / affirming steps taken     Current / Ongoing Stressors and Concerns:   Recurrent depression  family relational problems, medical problems, chronic pain, trauma history      Treatment Objective(s) Addressed in This Session:   Increase interest, engagement, and pleasure in doing things  Decrease frequency and intensity of feeling down, depressed, hopeless       Intervention:   CBT: Behavioral activation supported client's effort at establishing maintaining healthy boundaries with her family.  Encouraged client to stop enabling individual's behavior.  ASSESSMENT: Current Emotional / Mental Status (status of significant symptoms):   Risk status (Self / Other harm or suicidal ideation)   Client denies current fears or concerns for personal safety.   Client denies current or recent  suicidal ideation or behaviors.   Client denies current or recent homicidal ideation or behaviors.   Client denies current or recent self injurious behavior or ideation.   Client denies other safety concerns.   A safety and risk management plan has not been developed at this time, however client was given the after-hours number / 911 should there be a change in any of these risk factors.     Appearance:   Appropriate    Eye Contact:   Good    Psychomotor Behavior: Normal    Attitude:   Cooperative    Orientation:   All   Speech    Rate / Production: Normal     Volume:  Normal    Mood:    Normal   Affect:    Appropriate    Thought Content:  Clear    Thought Form:  Coherent  Logical    Insight:    Good      Medication Review:   No changes to current psychiatric medication(s)     Medication Compliance:   Yes     Changes in Health Issues:   None reported     Chemical Use Review:   Substance Use: Chemical use reviewed, no active concerns identified      Tobacco Use: Client reports no smoking for the last week     Collateral Reports Completed:   Not Applicable    PLAN: (Client Tasks / Therapist Tasks / Other)  Client to continue With her self-care routine and maintenance of boundaries.   Maintain medication compliance and contact with psychiatry.  Take care of herself physically.  Practice one to 2 grounding techniques each day.   Client to maintain sobriety and contact with supportive sober others.  Kd Morris                                                         ________________________________________________________________________    Treatment Plan    Client's Name: Bia Bill  YOB: 1965    Date: 10/24/2017      DSM5 Diagnoses: (Sustained by DSM5 Criteria Listed Above)  Diagnoses: 296.40 Bipolar I Disorder Current or Most Recent Episode Manic, Unspecified  Psychosocial & Contextual Factors: financial difficulties, chronic pain, roommate issues  WHODAS 2.0 (12 item)               This  questionnaire asks about difficulties due to health conditions. Health conditions             include disease or illnesses, other health problems that may be short or long lasting,             injuries, mental health or emotional problems, and problems with alcohol or drugs.                         Think back over the past 30 days and answer these questions, thinking about how much             difficulty you had doing the following activities. For each question, please [place] only             one response.      S1  Standing for long periods such as 30 minutes?  Mild =           2    S2  Taking care of household responsibilities?  Moderate =   3    S3  Learning a new task, for example, learning how to get to a new place?  Mild =           2    S4  How much of a problem do you have joining community activities (for example, festivals, [employer]ah[employer]ism or other activities) in the same way as anyone else can?  Moderate =   3    S5  How much have you been emotionally affected by your health problems?  Mild =           2        In the past 30 days, how much difficulty did you have in:    S6  Concentrating on doing something for ten minutes?  Mild =           2    S7  Walking a long distance such as a kilometer (or equivalent)?  Severe =       4    S8  Washing your whole body?  None =         1    S9  Getting dressed?  None =         1    S10  Dealing with people you do not know?  Moderate =   3    S11  Maintaining a friendship?  Severe =       4    S12  Your day to day work?  Moderate =   3       H1  Overall, in the past 30 days, how many days were these difficulties present?  Record number of days seven    H2  In the past 30 days, for how many days were you totally unable to carry out your usual activities or work because of any health condition?  Record number of days  seven    H3  In the past 30 days, not counting the days that you were totally unable, for how many days did you cut back or reduce your usual activities or work  because of any health condition?  Record number of days five                   Referral / Collaboration:  Referral to another professional/service is not indicated at this time..    Anticipated number of session or this episode of care: 15    Goals  1. Education- the Biopsychosocial model of depression  a. Client will be able to describe how depression is effecting them physically, emotionally and socially  2. Behavioral Activation  a. Client will learn to assess their depression on a day to day basis  b. Client will Identify two forms of exercise/activity and engage in them 3 times per week  c. Client will Identify 3 things that make them laugh, and engage in these 5 times per week.  d. Client will Identify 1-2Creative activities or hobbies  and engage in them 2 times per week  e. Client will identify music, movies, books that make them feel good and use them 3-4 times per week  3. Self-care  a. Client will identify 5 things they can do just for themselves  b. Client will take time for quiet, reflection, meditation 5 times per week  c. Client will Learn to set boundaries when appropriate  d. Client will Identify 2 individuals they can call on for support, distraction  4. Assessment of progress  a. Client will engage in assessment of their progress on a regular basis    Bipolar Disorder  Treatment plan:  1. Education- the Biopsychosocial model of Bipolar Disorder    a. Client will be able to describe in general terms what Bipolar Disorder  is and is not  b. Client will be able to describe how Bipolar Disorder  has affected their life in at least two different areas, such as school or work and Home/relationships  c. Clients parents/guardians or significant others will be provided information on what Bipolar Disorder  is and the ways it can affect relationships and be encouraged to be a part of clients treatment team.  2. Medication  a. Client will participate in medication evaluation for Bipolar Disorder  symptoms  and follow medication recommendations.   3. Identification and management of triggers  a. Client and therapist will examine patient s history to determine if there are predictable triggers for manic or depressive episodes (e.g. boredom, anger, family stress)  b. Client and therapist will develop means of diffusing these triggers (e.g. relaxation strategies, boundary setting, anger management)  4. Comorbid conditions   a. Client and therapist will asses for comorbid conditions (e.g. anxiety, depression, substance use). and add additional items to treatment plan as needed  5. Self-care  a. Client will identify 3 things they can do just for themselves  b. Client will take time for quiet, reflection, meditation 3 times per week  c. Client will Learn to set boundaries when appropriate  d. Client will Identify 2 individuals they can call on for support, distraction  5. Behavioral Activation  a. Client will Identify two forms of exercise/activity and engage in them 3 times per week  b. Client will Identify 2 things that make them laugh, and engage in these 3 times per week.  c. Client will Identify 2 Creative activities or hobbies  and engage in them 2 times per week  d. Client will identify music, movies, books that make them feel good and use them 3 times per week  6. Assessment of progress  a. Client will engage in assessment of their progress on a regular basis    Client has reviewed and agreed to the above plan.      Kd Morris  4/3/2018

## 2018-10-20 LAB — HCT VFR BLD AUTO: 60.9 % (ref 35–47)

## 2018-10-20 ASSESSMENT — PATIENT HEALTH QUESTIONNAIRE - PHQ9: SUM OF ALL RESPONSES TO PHQ QUESTIONS 1-9: 14

## 2018-10-20 ASSESSMENT — ANXIETY QUESTIONNAIRES: GAD7 TOTAL SCORE: 8

## 2018-10-22 LAB — HGB BLD-MCNC: 19.5 G/DL (ref 11.7–15.7)

## 2018-10-30 DIAGNOSIS — F33.1 MAJOR DEPRESSIVE DISORDER, RECURRENT EPISODE, MODERATE (H): ICD-10-CM

## 2018-10-30 NOTE — TELEPHONE ENCOUNTER
Routing refill request to provider for review/approval because:  PHQ-9 score  Associated Diagnoses   Major depressive disorder, recurrent episode, moderate (H) [F33.1]         PHQ-9 SCORE 9/23/2018 10/8/2018 10/19/2018   Total Score - - -   Total Score 13 12 14     Brenna FLETCHER RN

## 2018-10-30 NOTE — TELEPHONE ENCOUNTER
"TRAZODONE 150 MG TAB ZYDU        Last Written Prescription Date:  10/2/18  Last Fill Quantity: 30,   # refills: 0  Last Office Visit: 10/3/18  Future Office visit:    Next 5 appointments (look out 90 days)     Nov 02, 2018  1:00 PM CDT   Return Visit with Kd CHAWLA [facility] ([facility]    [address] [address]   [phone]            Nov 14, 2018  9:30 AM CST   Return Visit with Kd CHAWLA [facility] ([facility])    [address] [address]   [phone]                   Requested Prescriptions   Pending Prescriptions Disp Refills     traZODone (DESYREL) 150 MG tablet [Pharmacy Med Name: TRAZODONE 150 MG    TAB ZYDU] 30 tablet 0     Sig: TAKE ONE TABLET BY MOUTH NIGHTLY AT BEDTIME    Serotonin Modulators Passed    10/30/2018  8:55 AM       Passed - Recent (12 mo) or future (30 days) visit within the authorizing provider's specialty    Patient had office visit in the last 12 months or has a visit in the next 30 days with authorizing provider or within the authorizing provider's specialty.  See \"Patient Info\" tab in inbasket, or \"Choose Columns\" in Meds & Orders section of the refill encounter.             Passed - Patient is age 18 or older       Passed - No active pregnancy on record       Passed - No positive pregnancy test in past 12 months          "

## 2018-10-31 RX ORDER — TRAZODONE HYDROCHLORIDE 150 MG/1
TABLET ORAL
Qty: 30 TABLET | Refills: 0 | Status: SHIPPED | OUTPATIENT
Start: 2018-10-31 | End: 2018-12-03

## 2018-11-02 ENCOUNTER — TELEPHONE (OUTPATIENT)
Dept: FAMILY MEDICINE | Facility: CLINIC | Age: 53
End: 2018-11-02

## 2018-11-02 ENCOUNTER — ANTICOAGULATION THERAPY VISIT (OUTPATIENT)
Dept: ANTICOAGULATION | Facility: CLINIC | Age: 53
End: 2018-11-02

## 2018-11-02 ENCOUNTER — OFFICE VISIT (OUTPATIENT)
Dept: PSYCHOLOGY | Facility: CLINIC | Age: 53
End: 2018-11-02
Payer: COMMERCIAL

## 2018-11-02 ENCOUNTER — HOSPITAL ENCOUNTER (OUTPATIENT)
Facility: CLINIC | Age: 53
End: 2018-11-02
Attending: FAMILY MEDICINE
Payer: COMMERCIAL

## 2018-11-02 DIAGNOSIS — I73.9 PVD (PERIPHERAL VASCULAR DISEASE) WITH CLAUDICATION (H): ICD-10-CM

## 2018-11-02 DIAGNOSIS — I82.409 DVT (DEEP VENOUS THROMBOSIS) (H): ICD-10-CM

## 2018-11-02 DIAGNOSIS — D58.2 ELEVATED HEMOGLOBIN (H): ICD-10-CM

## 2018-11-02 DIAGNOSIS — Z79.01 LONG TERM CURRENT USE OF ANTICOAGULANT THERAPY: ICD-10-CM

## 2018-11-02 DIAGNOSIS — R71.8 ELEVATED HEMATOCRIT: ICD-10-CM

## 2018-11-02 DIAGNOSIS — D58.2 ELEVATED HEMOGLOBIN (H): Primary | ICD-10-CM

## 2018-11-02 DIAGNOSIS — F31.62 MODERATE MIXED BIPOLAR I DISORDER (H): Primary | ICD-10-CM

## 2018-11-02 DIAGNOSIS — D75.1 ERYTHROCYTOSIS: Chronic | ICD-10-CM

## 2018-11-02 DIAGNOSIS — Z86.73 HISTORY OF STROKE: ICD-10-CM

## 2018-11-02 LAB
BASOPHILS # BLD AUTO: 0 10E9/L (ref 0–0.2)
BASOPHILS NFR BLD AUTO: 0.4 %
DIFFERENTIAL METHOD BLD: ABNORMAL
EOSINOPHIL # BLD AUTO: 0.1 10E9/L (ref 0–0.7)
EOSINOPHIL NFR BLD AUTO: 1.6 %
ERYTHROCYTE [DISTWIDTH] IN BLOOD BY AUTOMATED COUNT: 21.1 % (ref 10–15)
HCT VFR BLD AUTO: 62.8 % (ref 35–47)
HGB BLD-MCNC: 19.6 G/DL (ref 11.7–15.7)
IMM GRANULOCYTES # BLD: 0 10E9/L (ref 0–0.4)
IMM GRANULOCYTES NFR BLD: 0.2 %
INR PPP: 1.95 (ref 0.86–1.14)
LYMPHOCYTES # BLD AUTO: 1.4 10E9/L (ref 0.8–5.3)
LYMPHOCYTES NFR BLD AUTO: 29.4 %
MCH RBC QN AUTO: 28 PG (ref 26.5–33)
MCHC RBC AUTO-ENTMCNC: 31.2 G/DL (ref 31.5–36.5)
MCV RBC AUTO: 90 FL (ref 78–100)
MONOCYTES # BLD AUTO: 0.4 10E9/L (ref 0–1.3)
MONOCYTES NFR BLD AUTO: 7.6 %
NEUTROPHILS # BLD AUTO: 3 10E9/L (ref 1.6–8.3)
NEUTROPHILS NFR BLD AUTO: 60.8 %
NRBC # BLD AUTO: 0 10*3/UL
NRBC BLD AUTO-RTO: 0 /100
PLATELET # BLD AUTO: 105 10E9/L (ref 150–450)
RBC # BLD AUTO: 6.99 10E12/L (ref 3.8–5.2)
WBC # BLD AUTO: 4.9 10E9/L (ref 4–11)

## 2018-11-02 PROCEDURE — 36415 COLL VENOUS BLD VENIPUNCTURE: CPT | Performed by: FAMILY MEDICINE

## 2018-11-02 PROCEDURE — 99207 ZZC NO CHARGE NURSE ONLY: CPT

## 2018-11-02 PROCEDURE — 85025 COMPLETE CBC W/AUTO DIFF WBC: CPT | Performed by: FAMILY MEDICINE

## 2018-11-02 PROCEDURE — 90834 PSYTX W PT 45 MINUTES: CPT | Performed by: PSYCHOLOGIST

## 2018-11-02 PROCEDURE — 85610 PROTHROMBIN TIME: CPT | Performed by: FAMILY MEDICINE

## 2018-11-02 NOTE — TELEPHONE ENCOUNTER
[facility] calling a critical lab for this patient. Hematocrit is 61.5 and the Hemoglobin is 19.4, [facility] is wondering if the provider wants a CBC with Diff ordered. Discussed with Dr. Cuevas now when she stepped into clinic today and asked her to review for Dr. Leong today. Dr. Cuevas says yes to do the CBC with diff and platelets and if comes back abnormal will need patient to follow up with appointment on Monday for workup, Dr. Cuevas says yes these are critical but will need to verify the results. Lab called to update and the patient was called to update and notify of Dr. Cuevas's instruction. Will route this to covering providers for review.      TAWANA Dawson

## 2018-11-02 NOTE — TELEPHONE ENCOUNTER
Discussed with Dr. Magdaleno who says can have patient come in on Monday and to verify plan with Hematology, patient called and says she is feeling ok, says is followed by Dr. Watts by him every 3-6 months. Patient declines appointment for Monday. Will forward to Dr. Leong for review. Advised that if anything changes or becomes symptomatic to seek ER if necessary and reviewed those symptoms with the patient, patient again says she is feeling fine.    TAWANA Dawson

## 2018-11-02 NOTE — TELEPHONE ENCOUNTER
Dr. Magdaleno says can come in Monday if patient would like but that is followed by Heme/Oncology, nothing else is needed, ok to wait for CBC with plt and diff.     TAWANA Dawson

## 2018-11-02 NOTE — MR AVS SNAPSHOT
MRN:[id number]                      After Visit Summary   11/2/2018    Bia Bill    MRN: [id number]           Visit Information        Provider Department      11/2/2018 1:00 PM Kd Morris [facility]      Your next 10 appointments already scheduled     Nov 14, 2018  9:30 AM CST   Return Visit with Kd Morris   [facility] ([facility])    [address] [address]   [phone]            Nov 29, 2018 12:00 PM CST   Return Visit with Kd Morris   [facility] ([facility])    [address] [address]   [phone]            Mar 29, 2019  1:10 PM CDT   LAB with [facility] ([facility])    [address] [address]   [phone]           Please do not eat 10-12 hours before your appointment if you are coming in fasting for labs on lipids, cholesterol, or glucose (sugar). This does not apply to pregnant women. Water, hot tea and black coffee (with nothing added) are okay. Do not drink other fluids, diet soda or chew gum.            Mar 29, 2019  2:00 PM CDT   Return Visit with Joshua Watts MD   [facility] ([facility])    [facility]  [address]  [facility] [place] [place] [address]   [phone]              Care EveryWhere ID     This is your Care EveryWhere ID. This could be used by other organizations to access your [facility] medical records  [id number]        Equal Access to Services     ANTIONETTE IRBY : Hadii aad ku hadasho Soomaali, waaxda luqadaha, qaybta kaalmada adeegyafrancis, nitin craig. So [facility] [phone].    ATENCIÓN: Si habla español, tiene a terry disposición servicios gratuitos de asistencia lingüística. Llame al [phone].    We comply with applicable federal civil rights laws and [place]  laws. We do not discriminate on the basis of race, color, national origin, age, disability, sex, sexual orientation, or gender identity.

## 2018-11-02 NOTE — TELEPHONE ENCOUNTER
This is pretty typical for Norah.   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     Component      Latest Ref Rng & Units 9/4/2018 10/5/2018 10/19/2018   Hematocrit      35.0 - 47.0 % 59.6 (H) 58.5 (H) 60.9 (H)   Hemoglobin      11.7 - 15.7 g/dL 18.9 (H) 18.3 (H) 19.5 (H)       Patient Active Problem List   Diagnosis     Mild persistent asthma     Polyneuropathy in other diseases classified elsewhere (H)     DVT (deep venous thrombosis) (H)     Alcohol abuse, in remission     SECONDARY POLYCYTHEMIA     Chondromalacia of patella     Sensorineural hearing loss, asymmetrical     Developmental reading disorder     Esophageal reflux     Hypothyroidism     Fatty liver     GILES (Obstructive Sleep Apnea)-Moderate (AHI 16)     RLS (restless legs syndrome)     Smoker     Erythrocytosis     Moderate mixed bipolar I disorder (H)     Personality disorder, depressive     COPD (chronic obstructive pulmonary disease) (H)     Current use of long term anticoagulation     Rosacea     Health Care Home     DDD (degenerative disc disease), cervical     Benign neoplasm of colon (POLYPOSIS)     Stroke (H)     Somatization disorder     Sebaceous cyst of right axilla     HTN, goal below 140/90     Left leg pain     Vitamin D deficiency     Long term current use of anticoagulant therapy     Morbid obesity (H)     PVD (peripheral vascular disease) with claudication (H)     Cyst of left ovary     Morbid obesity with alveolar hypoventilation (H)     GI bleed     Severe episode of recurrent major depressive disorder (H)     Conductive hearing loss of left ear with restricted hearing of right ear     Sensorineural hearing loss (SNHL) of right ear with restricted hearing of left ear     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

## 2018-11-02 NOTE — MR AVS SNAPSHOT
Bia Bill   11/2/2018   Anticoagulation Therapy Visit    Description:  52 year old female   Provider:  Angelina Nj, RN   Department:  [facility]           INR as of 11/2/2018     Today's INR 1.95!      Anticoagulation Summary as of 11/2/2018     INR goal 2.0-2.5   Today's INR 1.95!   Full warfarin instructions 11/2: 15 mg; Otherwise 10 mg on Mon, Wed, Fri; 12.5 mg all other days   Next INR check 11/14/2018    Indications   Long term current use of anticoagulant therapy [Z79.01]  DVT (deep venous thrombosis) (H) [I82.409]  PVD (peripheral vascular disease) with claudication (H) [I73.9]         November 2018 Details    Sun Mon Tue Wed Thu Fri Sat         1               2      15 mg   See details      3      12.5 mg           4      12.5 mg         5      10 mg         6      12.5 mg         7      10 mg         8      12.5 mg         9      10 mg         10      12.5 mg           11      12.5 mg         12      10 mg         13      12.5 mg         14            15               16               17                 18               19               20               21               22               23               24                 25               26               27               28               29               30                 Date Details   11/02 This INR check       Date of next INR:  11/14/2018         How to take your warfarin dose     To take:  10 mg Take 2 of the 5 mg tablets.    To take:  12.5 mg Take 2.5 of the 5 mg tablets.    To take:  15 mg Take 3 of the 5 mg tablets.

## 2018-11-02 NOTE — PROGRESS NOTES
ANTICOAGULATION FOLLOW-UP CLINIC VISIT    Patient Name:  Bia Bill  Date:  11/2/2018  Contact Type:  Telephone    SUBJECTIVE:     Patient Findings     Positives Missed doses (Missed her dose yesterday)    Comments No changes in medications, diet, or activity noted. Will plan to recheck the INR on the 14th when patient is back in the clinic to see Kd Morris.    Hemoglobin and hematocrit are high, MD is aware. Patient has INR drawn in the lab for the citrate corrected tube.            OBJECTIVE    INR   Date Value Ref Range Status   11/02/2018 1.95 (H) 0.86 - 1.14 Final     Comment:     Special tube used to correct for high hematocrit     Hemoglobin   Date Value Ref Range Status   11/02/2018 19.6 (H) 11.7 - 15.7 g/dL Final     Hematocrit   Date Value Ref Range Status   11/02/2018 62.8 (H) 35.0 - 47.0 % Final       ASSESSMENT / PLAN  INR assessment THER +/- 0.1 of goal INR range   Recheck INR In: 2 WEEKS    INR Location Outside lab      Anticoagulation Summary as of 11/2/2018     INR goal 2.0-2.5   Today's INR 1.95!   Warfarin maintenance plan 10 mg (5 mg x 2) on Mon, Wed, Fri; 12.5 mg (5 mg x 2.5) all other days   Full warfarin instructions 11/2: 15 mg; Otherwise 10 mg on Mon, Wed, Fri; 12.5 mg all other days   Weekly warfarin total 80 mg   Plan last modified Angelina Nj RN (7/12/2018)   Next INR check 11/14/2018   Priority INR   Target end date Indefinite    Indications   Long term current use of anticoagulant therapy [Z79.01]  DVT (deep venous thrombosis) (H) [I82.409]  PVD (peripheral vascular disease) with claudication (H) [I73.9]         Anticoagulation Episode Summary     INR check location     Preferred lab     Send INR reminders to [place] [facility]    Comments * lab draw due to elevated hematocrit (fingerstick meters don't work). LEFT LE. STENT x 3 LEFT UPPER LEG. Previous arterial clot. New goal range 2-2.5 starting 11/6/17.      Anticoagulation Care Providers     Provider Role  Specialty Phone number    Kd Leong MD [facility] [phone]            See the Encounter Report to view Anticoagulation Flowsheet and Dosing Calendar (Go to Encounters tab in chart review, and find the Anticoagulation Therapy Visit)        [facility]j RN [facility]

## 2018-11-05 DIAGNOSIS — J44.1 COPD EXACERBATION (H): ICD-10-CM

## 2018-11-05 NOTE — TELEPHONE ENCOUNTER
"BUPROPN HCL X 150MG TAB PARP        Last Written Prescription Date:  7/6/18  Last Fill Quantity: 30,   # refills: 0  Last Office Visit: 10/3/18  Future Office visit:    Next 5 appointments (look out 90 days)     Nov 14, 2018  9:30 AM CST   Return Visit with Kd Morris   [facility] ([facility])    [address] [address]   [phone]            Nov 29, 2018 12:00 PM CST   Return Visit with Kd CHAWLA [facility] ([facility])    [address] [address]   [phone]                   Requested Prescriptions   Pending Prescriptions Disp Refills     buPROPion (WELLBUTRIN XL) 150 MG 24 hr tablet [Pharmacy Med Name: BUPROPN HCL X 150MG TAB PARP] 30 tablet 0     Sig: TAKE  ONE TABLET BY MOUTH EVERY DAY IN THE MORNING    SSRIs Protocol Passed    11/5/2018  1:08 PM       Passed - Recent (12 mo) or future (30 days) visit within the authorizing provider's specialty    Patient had office visit in the last 12 months or has a visit in the next 30 days with authorizing provider or within the authorizing provider's specialty.  See \"Patient Info\" tab in inbasket, or \"Choose Columns\" in Meds & Orders section of the refill encounter.             Passed - Medication is Bupropion    If the medication is Bupropion (Wellbutrin), and the patient is taking for smoking cessation; OK to refill.         Passed - Patient is age 18 or older       Passed - No active pregnancy on record       Passed - No positive pregnancy test in last 12 months          "

## 2018-11-06 ASSESSMENT — ANXIETY QUESTIONNAIRES
GAD7 TOTAL SCORE: 12
6. BECOMING EASILY ANNOYED OR IRRITABLE: MORE THAN HALF THE DAYS
3. WORRYING TOO MUCH ABOUT DIFFERENT THINGS: MORE THAN HALF THE DAYS
2. NOT BEING ABLE TO STOP OR CONTROL WORRYING: MORE THAN HALF THE DAYS
4. TROUBLE RELAXING: MORE THAN HALF THE DAYS
1. FEELING NERVOUS, ANXIOUS, OR ON EDGE: MORE THAN HALF THE DAYS
5. BEING SO RESTLESS THAT IT IS HARD TO SIT STILL: MORE THAN HALF THE DAYS
7. FEELING AFRAID AS IF SOMETHING AWFUL MIGHT HAPPEN: NOT AT ALL

## 2018-11-06 ASSESSMENT — PATIENT HEALTH QUESTIONNAIRE - PHQ9: SUM OF ALL RESPONSES TO PHQ QUESTIONS 1-9: 14

## 2018-11-06 NOTE — TELEPHONE ENCOUNTER
Routing refill request to provider for review/approval because:  Unclear if patient is taking. Last fill of 30 tab on 7/6/18.    Marie Nj RN

## 2018-11-06 NOTE — PROGRESS NOTES
Progress Note  Disclaimer: This note consists of symbols derived from keyboarding, dictation and/or voice recognition software. As a result, there may be errors in the script that have gone undetected. Please consider this when interpreting information found in this chart.    Client Name: Bia Bill  Date: 11/2/2018         Service Type: Individual      Session Start Time: 8: 1:00 PM session End Time: 1:45 PM   Session Length: 45     Session #: 39     Attendees: Client attended alone    Treatment Plan Last Reviewed: 10/19/2018  PHQ-9 / CHIN-7 : See flowsheet     DATA   Client reports she is still having arguments with her daughter regarding her daughter's substance use.  Close friend had a accidental concussion.  Client reported she did call [facility] crisis line a couple of days ago, not suicidal just depressed and anxious.  We reviewed self-care techniques relaxation and distraction.   Progress Since Last Session (Related to Symptoms / Goals / Homework):   Symptoms: Stable    Homework: Achieved / completed to satisfaction      Episode of Care Goals: Satisfactory progress - ACTION (Actively working towards change); Intervened by reinforcing change plan / affirming steps taken     Current / Ongoing Stressors and Concerns:   Recurrent depression  family relational problems, medical problems, chronic pain, trauma history      Treatment Objective(s) Addressed in This Session:   Increase interest, engagement, and pleasure in doing things  Decrease frequency and intensity of feeling down, depressed, hopeless       Intervention:   CBT: Behavioral activation supported client's effort at establishing maintaining healthy boundaries with her family.    ASSESSMENT: Current Emotional / Mental Status (status of significant symptoms):   Risk status (Self / Other harm or suicidal ideation)   Client denies current fears or concerns for personal safety.   Client denies current or  recent suicidal ideation or behaviors.   Client denies current or recent homicidal ideation or behaviors.   Client denies current or recent self injurious behavior or ideation.   Client denies other safety concerns.   A safety and risk management plan has not been developed at this time, however client was given the after-hours number / 911 should there be a change in any of these risk factors.     Appearance:   Appropriate    Eye Contact:   Good    Psychomotor Behavior: Normal    Attitude:   Cooperative    Orientation:   All   Speech    Rate / Production: Normal     Volume:  Normal    Mood:    Normal   Affect:    Appropriate    Thought Content:  Clear    Thought Form:  Coherent  Logical    Insight:    Good      Medication Review:   No changes to current psychiatric medication(s)     Medication Compliance:   Yes     Changes in Health Issues:   None reported     Chemical Use Review:   Substance Use: Chemical use reviewed, no active concerns identified      Tobacco Use: Client reports no smoking for the last week     Collateral Reports Completed:   Not Applicable    PLAN: (Client Tasks / Therapist Tasks / Other)  Client to continue With her self-care routine and maintenance of boundaries.   Maintain medication compliance and contact with psychiatry.  Take care of herself physically.  Practice one to 2 grounding techniques each day.   Client to maintain sobriety and contact with supportive sober others.  Kd Morris                                                         ________________________________________________________________________    Treatment Plan    Client's Name: Bia Bill  YOB: 1965    Date: 10/24/2017      DSM5 Diagnoses: (Sustained by DSM5 Criteria Listed Above)  Diagnoses: 296.40 Bipolar I Disorder Current or Most Recent Episode Manic, Unspecified  Psychosocial & Contextual Factors: financial difficulties, chronic pain, roommate issues  WHODAS 2.0 (12 item)                This questionnaire asks about difficulties due to health conditions. Health conditions             include disease or illnesses, other health problems that may be short or long lasting,             injuries, mental health or emotional problems, and problems with alcohol or drugs.                         Think back over the past 30 days and answer these questions, thinking about how much             difficulty you had doing the following activities. For each question, please [place] only             one response.      S1  Standing for long periods such as 30 minutes?  Mild =           2    S2  Taking care of household responsibilities?  Moderate =   3    S3  Learning a new task, for example, learning how to get to a new place?  Mild =           2    S4  How much of a problem do you have joining community activities (for example, festivals, [employer] or other activities) in the same way as anyone else can?  Moderate =   3    S5  How much have you been emotionally affected by your health problems?  Mild =           2        In the past 30 days, how much difficulty did you have in:    S6  Concentrating on doing something for ten minutes?  Mild =           2    S7  Walking a long distance such as a kilometer (or equivalent)?  Severe =       4    S8  Washing your whole body?  None =         1    S9  Getting dressed?  None =         1    S10  Dealing with people you do not know?  Moderate =   3    S11  Maintaining a friendship?  Severe =       4    S12  Your day to day work?  Moderate =   3       H1  Overall, in the past 30 days, how many days were these difficulties present?  Record number of days seven    H2  In the past 30 days, for how many days were you totally unable to carry out your usual activities or work because of any health condition?  Record number of days  seven    H3  In the past 30 days, not counting the days that you were totally unable, for how many days did you cut back or reduce your usual  activities or work because of any health condition?  Record number of days five                   Referral / Collaboration:  Referral to another professional/service is not indicated at this time..    Anticipated number of session or this episode of care: 15    Goals  1. Education- the Biopsychosocial model of depression  a. Client will be able to describe how depression is effecting them physically, emotionally and socially  2. Behavioral Activation  a. Client will learn to assess their depression on a day to day basis  b. Client will Identify two forms of exercise/activity and engage in them 3 times per week  c. Client will Identify 3 things that make them laugh, and engage in these 5 times per week.  d. Client will Identify 1-2Creative activities or hobbies  and engage in them 2 times per week  e. Client will identify music, movies, books that make them feel good and use them 3-4 times per week  3. Self-care  a. Client will identify 5 things they can do just for themselves  b. Client will take time for quiet, reflection, meditation 5 times per week  c. Client will Learn to set boundaries when appropriate  d. Client will Identify 2 individuals they can call on for support, distraction  4. Assessment of progress  a. Client will engage in assessment of their progress on a regular basis    Bipolar Disorder  Treatment plan:  1. Education- the Biopsychosocial model of Bipolar Disorder    a. Client will be able to describe in general terms what Bipolar Disorder  is and is not  b. Client will be able to describe how Bipolar Disorder  has affected their life in at least two different areas, such as school or work and Home/relationships  c. Clients parents/guardians or significant others will be provided information on what Bipolar Disorder  is and the ways it can affect relationships and be encouraged to be a part of clients treatment team.  2. Medication  a. Client will participate in medication evaluation for Bipolar  Disorder  symptoms and follow medication recommendations.   3. Identification and management of triggers  a. Client and therapist will examine patient s history to determine if there are predictable triggers for manic or depressive episodes (e.g. boredom, anger, family stress)  b. Client and therapist will develop means of diffusing these triggers (e.g. relaxation strategies, boundary setting, anger management)  4. Comorbid conditions   a. Client and therapist will asses for comorbid conditions (e.g. anxiety, depression, substance use). and add additional items to treatment plan as needed  5. Self-care  a. Client will identify 3 things they can do just for themselves  b. Client will take time for quiet, reflection, meditation 3 times per week  c. Client will Learn to set boundaries when appropriate  d. Client will Identify 2 individuals they can call on for support, distraction  5. Behavioral Activation  a. Client will Identify two forms of exercise/activity and engage in them 3 times per week  b. Client will Identify 2 things that make them laugh, and engage in these 3 times per week.  c. Client will Identify 2 Creative activities or hobbies  and engage in them 2 times per week  d. Client will identify music, movies, books that make them feel good and use them 3 times per week  6. Assessment of progress  a. Client will engage in assessment of their progress on a regular basis    Client has reviewed and agreed to the above plan.      Kd Morris  4/3/2018

## 2018-11-07 RX ORDER — BUPROPION HYDROCHLORIDE 150 MG/1
TABLET ORAL
Qty: 30 TABLET | Refills: 0 | Status: SHIPPED | OUTPATIENT
Start: 2018-11-07 | End: 2018-12-03

## 2018-11-07 ASSESSMENT — ANXIETY QUESTIONNAIRES: GAD7 TOTAL SCORE: 12

## 2018-11-18 DIAGNOSIS — G63 POLYNEUROPATHY IN OTHER DISEASES CLASSIFIED ELSEWHERE (H): Chronic | ICD-10-CM

## 2018-11-18 DIAGNOSIS — F33.1 MAJOR DEPRESSIVE DISORDER, RECURRENT EPISODE, MODERATE (H): ICD-10-CM

## 2018-11-19 RX ORDER — GABAPENTIN 600 MG/1
TABLET ORAL
Qty: 90 TABLET | Refills: 0 | Status: SHIPPED | OUTPATIENT
Start: 2018-11-19 | End: 2018-12-18

## 2018-11-19 RX ORDER — LAMOTRIGINE 200 MG/1
TABLET ORAL
Qty: 30 TABLET | Refills: 0 | Status: SHIPPED | OUTPATIENT
Start: 2018-11-19 | End: 2018-12-18

## 2018-11-19 NOTE — TELEPHONE ENCOUNTER
GABAPENTIN 600 MG TAB ASCE        Last Written Prescription Date:  10/16/18  Last Fill Quantity: 90,   # refills: 0  Last Office Visit: 10/3/18  Future Office visit:    Next 5 appointments (look out 90 days)     Nov 29, 2018 12:00 PM CST   Return Visit with Kd Morris   [facility] ([facility])    [address] [address]   [phone]                   Routing refill request to provider for review/approval because:  Drug not on the [facility], [facility] or  Health refill protocol or controlled substance

## 2018-11-19 NOTE — TELEPHONE ENCOUNTER
"LAMOTRIGINE 200 MG TAB NORT        Last Written Prescription Date:  9/17/18  Last Fill Quantity: 30,   # refills: 1  Last Office Visit: 10/3/18  Future Office visit:    Next 5 appointments (look out 90 days)     Nov 29, 2018 12:00 PM CST   Return Visit with Kd Morris   [facility] ([facility])    [address] [address]   [phone]                   Requested Prescriptions   Pending Prescriptions Disp Refills     lamoTRIgine (LAMICTAL) 200 MG tablet [Pharmacy Med Name: LAMOTRIGINE 200 MG  TAB NORT] 30 tablet 0     Sig: TAKE ONE TABLET BY MOUTH ONE TIME DAILY    Anti-Seizure Meds Protocol  Failed    11/18/2018 11:29 AM       Failed - Review Authorizing provider's last note.     Refer to last progress notes: confirm request is for original authorizing provider (cannot be through other providers).         Failed - Normal CBC on file in past 26 months    Recent Labs   Lab Test  11/02/18   1407   WBC  4.9   RBC  6.99*   HGB  19.6*   HCT  62.8*   PLT  105*                Failed - Normal platelet count on file in past 26 months    Recent Labs   Lab Test  11/02/18   1407   PLT  105*              Passed - Recent (12 mo) or future (30 days) visit within the authorizing provider's specialty    Patient had office visit in the last 12 months or has a visit in the next 30 days with authorizing provider or within the authorizing provider's specialty.  See \"Patient Info\" tab in inbasket, or \"Choose Columns\" in Meds & Orders section of the refill encounter.             Passed - Normal ALT or AST on file in past 26 months    Recent Labs   Lab Test  05/16/18   1043   ALT  34     Recent Labs   Lab Test  05/16/18   1043   AST  23            Passed - No active pregnancy on record       Passed - No positive pregnancy test in last 12 months          "

## 2018-12-03 DIAGNOSIS — J44.1 COPD EXACERBATION (H): ICD-10-CM

## 2018-12-03 DIAGNOSIS — F33.1 MAJOR DEPRESSIVE DISORDER, RECURRENT EPISODE, MODERATE (H): ICD-10-CM

## 2018-12-03 NOTE — TELEPHONE ENCOUNTER
**This refill requires provider completion and is not appropriate for RN review per RN refill guidelines.**  PH-Q9 needs to be less than 5 to approve medication on RN Refill protocol pt's score is 14.  Bailey Lozano RN

## 2018-12-03 NOTE — TELEPHONE ENCOUNTER
"BUPROPN HCL X 150MG TAB SAND        Last Written Prescription Date:  11/7/18  Last Fill Quantity: 30,   # refills: 0  Last Office Visit: 10/3/18  Future Office visit:    Next 5 appointments (look out 90 days)     Dec 17, 2018  5:00 PM CST   Return Visit with [facility] ([facility])    [address] [address]   [phone]                   traZODone (DESYREL) 150 MG tablet      Last Written Prescription Date:  10/31/18  Last Fill Quantity: 30,   # refills: 0  Last Office Visit: 10/3/18  Future Office visit:    Next 5 appointments (look out 90 days)     Dec 17, 2018  5:00 PM CST   Return Visit with [facility] ([facility])    [address] [address]   [phone]                   Requested Prescriptions   Pending Prescriptions Disp Refills     buPROPion (WELLBUTRIN XL) 150 MG 24 hr tablet [Pharmacy Med Name: BUPROPN HCL X 150MG TAB SAND] 30 tablet 0     Sig: TAKE  ONE TABLET BY MOUTH EVERY DAY IN THE MORNING    SSRIs Protocol Failed    12/3/2018 11:46 AM       Failed - PHQ-9 score less than 5 in past 6 months    Please review last PHQ-9 score.          Passed - Medication is Bupropion    If the medication is Bupropion (Wellbutrin), and the patient is taking for smoking cessation; OK to refill.         Passed - Patient is age 18 or older       Passed - No active pregnancy on record       Passed - No positive pregnancy test in last 12 months       Passed - Recent (6 mo) or future (30 days) visit within the authorizing provider's specialty    Patient had office visit in the last 6 months or has a visit in the next 30 days with authorizing provider or within the authorizing provider's specialty.  See \"Patient Info\" tab in inbasket, or \"Choose Columns\" in Meds & Orders section of the refill encounter.            traZODone (DESYREL) 150 MG tablet [Pharmacy Med Name: TRAZODONE 150 MG    TAB " "ZYDU] 30 tablet 0     Sig: TAKE ONE TABLET BY MOUTH AT BEDTIME    Serotonin Modulators Passed    12/3/2018 11:46 AM       Passed - Recent (12 mo) or future (30 days) visit within the authorizing provider's specialty    Patient had office visit in the last 12 months or has a visit in the next 30 days with authorizing provider or within the authorizing provider's specialty.  See \"Patient Info\" tab in inbasket, or \"Choose Columns\" in Meds & Orders section of the refill encounter.             Passed - Patient is age 18 or older       Passed - No active pregnancy on record       Passed - No positive pregnancy test in past 12 months          "

## 2018-12-04 DIAGNOSIS — E78.00 HYPERCHOLESTEREMIA: ICD-10-CM

## 2018-12-05 RX ORDER — SIMVASTATIN 20 MG
TABLET ORAL
Qty: 90 TABLET | Refills: 1 | Status: SHIPPED | OUTPATIENT
Start: 2018-12-05 | End: 2019-06-12

## 2018-12-05 NOTE — TELEPHONE ENCOUNTER
"Requested Prescriptions   Pending Prescriptions Disp Refills     simvastatin (ZOCOR) 20 MG tablet [Pharmacy Med Name: SIMVASTATIN 20 MG   TAB ACCO] 30 tablet 0     Sig: TAKE ONE TABLET BY MOUTH AT BEDTIME    Last Written Prescription Date:  05/31/18  Last Fill Quantity: 90,  # refills: 1   Last office visit: 10/3/2018 with prescribing provider:     Future Office Visit:   Next 5 appointments (look out 90 days)     Dec 17, 2018  5:00 PM CST   Return Visit with Kd Morris   [facility] ([facility])    [address] [address]   [phone]                     Statins Protocol Passed    12/4/2018  8:46 PM       Passed - LDL on file in past 12 months    Recent Labs   Lab Test  04/06/18   1316   LDL  101*            Passed - No abnormal creatine kinase in past 12 months    Recent Labs   Lab Test  11/30/15   1508   CKT  102               Passed - Recent (12 mo) or future (30 days) visit within the authorizing provider's specialty    Patient had office visit in the last 12 months or has a visit in the next 30 days with authorizing provider or within the authorizing provider's specialty.  See \"Patient Info\" tab in inbasket, or \"Choose Columns\" in Meds & Orders section of the refill encounter.             Passed - Patient is age 18 or older       Passed - No active pregnancy on record       Passed - No positive pregnancy test in past 12 months          "

## 2018-12-06 RX ORDER — BUPROPION HYDROCHLORIDE 150 MG/1
TABLET ORAL
Qty: 30 TABLET | Refills: 0 | Status: SHIPPED | OUTPATIENT
Start: 2018-12-06 | End: 2019-01-07

## 2018-12-06 RX ORDER — TRAZODONE HYDROCHLORIDE 150 MG/1
TABLET ORAL
Qty: 30 TABLET | Refills: 0 | Status: SHIPPED | OUTPATIENT
Start: 2018-12-06 | End: 2018-12-31

## 2018-12-11 DIAGNOSIS — I89.0 LYMPHEDEMA OF BOTH LOWER EXTREMITIES: ICD-10-CM

## 2018-12-11 NOTE — TELEPHONE ENCOUNTER
"VITAMIN D3 1000UNIT TAB JIA      Last Written Prescription Date:  na  Last Fill Quantity: na,   # refills: na  Last Office Visit: 10/3/18  Future Office visit:    Next 5 appointments (look out 90 days)    Dec 13, 2018 10:30 AM CST  Return Visit with [facility] ([facility] [address] [address]  [phone]   Dec 17, 2018  5:00 PM CST  Return Visit with [facility] ([facility]) [address] [address]  [phone]           furosemide (LASIX) 20 MG tablet      Last Written Prescription Date:  7/24/18  Last Fill Quantity: 180,   # refills: 2  Last Office Visit: 10/3/18  Future Office visit:    Next 5 appointments (look out 90 days)    Dec 13, 2018 10:30 AM CST  Return Visit with [facility] [address] [address]  [phone]   Dec 17, 2018  5:00 PM CST  Return Visit with [facility] ([facility]) [address] [place] [address]  [phone]           Requested Prescriptions   Pending Prescriptions Disp Refills     VITAMIN D3 1000 units tablet [Pharmacy Med Name: VITAMIN D3 1000UNIT TAB JIA] 30 tablet 2     Sig: TAKE ONE TABLET BY MOUTH ONE TIME DAILY    Vitamin Supplements (Adult) Protocol Passed - 12/11/2018 11:19 AM       Passed - High dose Vitamin D not ordered       Passed - Recent (12 mo) or future (30 days) visit within the authorizing provider's specialty    Patient had office visit in the last 12 months or has a visit in the next 30 days with authorizing provider or within the authorizing provider's specialty.  See \"Patient Info\" tab in inbasket, or \"Choose Columns\" in Meds & Orders section of the refill encounter.              furosemide (LASIX) 20 MG tablet [Pharmacy Med Name: FUROSEMIDE 20 MG    TAB HIKM] 60 tablet 1     Sig: TAKE TWO " "TABLETS BY MOUTH TWICE DAILY    Diuretics (Including Combos) Protocol Passed - 12/11/2018 11:19 AM       Passed - Blood pressure under 140/90 in past 12 months    BP Readings from Last 3 Encounters:   10/03/18 126/76   09/28/18 125/52   09/12/18 134/63                Passed - Recent (12 mo) or future (30 days) visit within the authorizing provider's specialty    Patient had office visit in the last 12 months or has a visit in the next 30 days with authorizing provider or within the authorizing provider's specialty.  See \"Patient Info\" tab in inbasket, or \"Choose Columns\" in Meds & Orders section of the refill encounter.             Passed - Patient is age 18 or older       Passed - No active pregancy on record       Passed - Normal serum creatinine on file in past 12 months    Recent Labs   Lab Test 08/25/18  2242   CR 0.77             Passed - Normal serum potassium on file in past 12 months    Recent Labs   Lab Test 08/25/18  2242   POTASSIUM 3.5                   Passed - Normal serum sodium on file in past 12 months    Recent Labs   Lab Test 08/25/18  2242                Passed - No positive pregnancy test in past 12 months          "

## 2018-12-11 NOTE — TELEPHONE ENCOUNTER
Routing refill request to provider for review/approval because:  High dose lasix 80 mg per day Madeleine Mesa RN

## 2018-12-12 RX ORDER — FUROSEMIDE 20 MG
TABLET ORAL
Qty: 60 TABLET | Refills: 1 | Status: SHIPPED | OUTPATIENT
Start: 2018-12-12 | End: 2019-01-14

## 2018-12-12 RX ORDER — CHOLECALCIFEROL (VITAMIN D3) 25 MCG
TABLET ORAL
Qty: 30 TABLET | Refills: 2 | Status: SHIPPED | OUTPATIENT
Start: 2018-12-12 | End: 2019-05-20

## 2018-12-13 ENCOUNTER — OFFICE VISIT (OUTPATIENT)
Dept: PSYCHOLOGY | Facility: CLINIC | Age: 53
End: 2018-12-13
Payer: COMMERCIAL

## 2018-12-13 DIAGNOSIS — J44.9 CHRONIC OBSTRUCTIVE PULMONARY DISEASE, UNSPECIFIED COPD TYPE (H): Chronic | ICD-10-CM

## 2018-12-13 DIAGNOSIS — F31.62 MODERATE MIXED BIPOLAR I DISORDER (H): Primary | ICD-10-CM

## 2018-12-13 DIAGNOSIS — I82.409 DVT (DEEP VENOUS THROMBOSIS) (H): ICD-10-CM

## 2018-12-13 DIAGNOSIS — M10.9 ACUTE GOUTY ARTHRITIS: ICD-10-CM

## 2018-12-13 DIAGNOSIS — Z86.73 HISTORY OF STROKE: ICD-10-CM

## 2018-12-13 LAB
BASOPHILS # BLD AUTO: 0 10E9/L (ref 0–0.2)
BASOPHILS NFR BLD AUTO: 0.5 %
DIFFERENTIAL METHOD BLD: ABNORMAL
EOSINOPHIL # BLD AUTO: 0.1 10E9/L (ref 0–0.7)
EOSINOPHIL NFR BLD AUTO: 0.8 %
ERYTHROCYTE [DISTWIDTH] IN BLOOD BY AUTOMATED COUNT: 21.3 % (ref 10–15)
HCT VFR BLD AUTO: 60.8 % (ref 35–47)
HGB BLD-MCNC: 19.3 G/DL (ref 11.7–15.7)
IMM GRANULOCYTES # BLD: 0 10E9/L (ref 0–0.4)
IMM GRANULOCYTES NFR BLD: 0.7 %
INR PPP: 3.26 (ref 0.86–1.14)
LYMPHOCYTES # BLD AUTO: 1.4 10E9/L (ref 0.8–5.3)
LYMPHOCYTES NFR BLD AUTO: 23.1 %
MCH RBC QN AUTO: 28.4 PG (ref 26.5–33)
MCHC RBC AUTO-ENTMCNC: 31.7 G/DL (ref 31.5–36.5)
MCV RBC AUTO: 90 FL (ref 78–100)
MONOCYTES # BLD AUTO: 0.4 10E9/L (ref 0–1.3)
MONOCYTES NFR BLD AUTO: 6.4 %
NEUTROPHILS # BLD AUTO: 4.2 10E9/L (ref 1.6–8.3)
NEUTROPHILS NFR BLD AUTO: 68.5 %
NRBC # BLD AUTO: 0 10*3/UL
NRBC BLD AUTO-RTO: 0 /100
PLATELET # BLD AUTO: 106 10E9/L (ref 150–450)
RBC # BLD AUTO: 6.79 10E12/L (ref 3.8–5.2)
URATE SERPL-MCNC: 4.5 MG/DL (ref 2.6–6)
WBC # BLD AUTO: 6.1 10E9/L (ref 4–11)

## 2018-12-13 PROCEDURE — 85025 COMPLETE CBC W/AUTO DIFF WBC: CPT | Performed by: FAMILY MEDICINE

## 2018-12-13 PROCEDURE — 84550 ASSAY OF BLOOD/URIC ACID: CPT | Performed by: FAMILY MEDICINE

## 2018-12-13 PROCEDURE — 36415 COLL VENOUS BLD VENIPUNCTURE: CPT | Performed by: FAMILY MEDICINE

## 2018-12-13 PROCEDURE — 85610 PROTHROMBIN TIME: CPT | Performed by: FAMILY MEDICINE

## 2018-12-13 PROCEDURE — 90834 PSYTX W PT 45 MINUTES: CPT | Performed by: PSYCHOLOGIST

## 2018-12-17 ENCOUNTER — NURSE TRIAGE (OUTPATIENT)
Dept: NURSING | Facility: CLINIC | Age: 53
End: 2018-12-17

## 2018-12-17 ASSESSMENT — ANXIETY QUESTIONNAIRES
2. NOT BEING ABLE TO STOP OR CONTROL WORRYING: SEVERAL DAYS
1. FEELING NERVOUS, ANXIOUS, OR ON EDGE: SEVERAL DAYS
7. FEELING AFRAID AS IF SOMETHING AWFUL MIGHT HAPPEN: NOT AT ALL
GAD7 TOTAL SCORE: 9
4. TROUBLE RELAXING: MORE THAN HALF THE DAYS
3. WORRYING TOO MUCH ABOUT DIFFERENT THINGS: SEVERAL DAYS
6. BECOMING EASILY ANNOYED OR IRRITABLE: MORE THAN HALF THE DAYS
5. BEING SO RESTLESS THAT IT IS HARD TO SIT STILL: MORE THAN HALF THE DAYS

## 2018-12-17 ASSESSMENT — PATIENT HEALTH QUESTIONNAIRE - PHQ9: SUM OF ALL RESPONSES TO PHQ QUESTIONS 1-9: 9
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
## 2018-12-18 ENCOUNTER — APPOINTMENT (OUTPATIENT)
Dept: CT IMAGING | Facility: CLINIC | Age: 53
End: 2018-12-18
Attending: STUDENT IN AN ORGANIZED HEALTH CARE EDUCATION/TRAINING PROGRAM
Payer: COMMERCIAL

## 2018-12-18 ENCOUNTER — HOSPITAL ENCOUNTER (EMERGENCY)
Facility: CLINIC | Age: 53
Discharge: HOME | End: 2018-12-18
Attending: STUDENT IN AN ORGANIZED HEALTH CARE EDUCATION/TRAINING PROGRAM | Admitting: STUDENT IN AN ORGANIZED HEALTH CARE EDUCATION/TRAINING PROGRAM
Payer: COMMERCIAL

## 2018-12-18 ENCOUNTER — PATIENT OUTREACH (OUTPATIENT)
Dept: CARE COORDINATION | Facility: CLINIC | Age: 53
End: 2018-12-18

## 2018-12-18 ENCOUNTER — APPOINTMENT (OUTPATIENT)
Dept: ULTRASOUND IMAGING | Facility: CLINIC | Age: 53
End: 2018-12-18
Attending: STUDENT IN AN ORGANIZED HEALTH CARE EDUCATION/TRAINING PROGRAM
Payer: COMMERCIAL

## 2018-12-18 VITALS
HEIGHT: 59 IN | RESPIRATION RATE: 18 BRPM | BODY MASS INDEX: 44.96 KG/M2 | WEIGHT: 223 LBS | SYSTOLIC BLOOD PRESSURE: 157 MMHG | OXYGEN SATURATION: 93 % | TEMPERATURE: 98.2 F | DIASTOLIC BLOOD PRESSURE: 84 MMHG | HEART RATE: 77 BPM

## 2018-12-18 DIAGNOSIS — G63 POLYNEUROPATHY IN OTHER DISEASES CLASSIFIED ELSEWHERE (H): Chronic | ICD-10-CM

## 2018-12-18 DIAGNOSIS — R10.9 RIGHT FLANK PAIN: ICD-10-CM

## 2018-12-18 DIAGNOSIS — R10.11 RUQ ABDOMINAL PAIN: ICD-10-CM

## 2018-12-18 DIAGNOSIS — F33.1 MAJOR DEPRESSIVE DISORDER, RECURRENT EPISODE, MODERATE (H): ICD-10-CM

## 2018-12-18 DIAGNOSIS — K80.50 BILIARY COLIC: ICD-10-CM

## 2018-12-18 LAB
ALBUMIN SERPL-MCNC: 3.3 G/DL (ref 3.4–5)
ALBUMIN UR-MCNC: 30 MG/DL
ALP SERPL-CCNC: 135 U/L (ref 40–150)
ALT SERPL W P-5'-P-CCNC: 27 U/L (ref 0–50)
ANION GAP SERPL CALCULATED.3IONS-SCNC: 7 MMOL/L (ref 3–14)
APPEARANCE UR: CLEAR
AST SERPL W P-5'-P-CCNC: 22 U/L (ref 0–45)
BACTERIA #/AREA URNS HPF: ABNORMAL /HPF
BASOPHILS # BLD AUTO: 0 10E9/L (ref 0–0.2)
BASOPHILS NFR BLD AUTO: 0.6 %
BILIRUB SERPL-MCNC: 0.5 MG/DL (ref 0.2–1.3)
BILIRUB UR QL STRIP: NEGATIVE
BUN SERPL-MCNC: 12 MG/DL (ref 7–30)
CALCIUM SERPL-MCNC: 8.1 MG/DL (ref 8.5–10.1)
CHLORIDE SERPL-SCNC: 105 MMOL/L (ref 94–109)
CO2 SERPL-SCNC: 28 MMOL/L (ref 20–32)
COLOR UR AUTO: YELLOW
CREAT SERPL-MCNC: 0.74 MG/DL (ref 0.52–1.04)
DIFFERENTIAL METHOD BLD: ABNORMAL
EOSINOPHIL # BLD AUTO: 0.1 10E9/L (ref 0–0.7)
EOSINOPHIL NFR BLD AUTO: 0.8 %
ERYTHROCYTE [DISTWIDTH] IN BLOOD BY AUTOMATED COUNT: 21.4 % (ref 10–15)
GFR SERPL CREATININE-BSD FRML MDRD: 82 ML/MIN/1.7M2
GLUCOSE SERPL-MCNC: 118 MG/DL (ref 70–99)
GLUCOSE UR STRIP-MCNC: NEGATIVE MG/DL
HCT VFR BLD AUTO: 60.8 % (ref 35–47)
HGB BLD-MCNC: 19.2 G/DL (ref 11.7–15.7)
HGB UR QL STRIP: ABNORMAL
IMM GRANULOCYTES # BLD: 0.1 10E9/L (ref 0–0.4)
IMM GRANULOCYTES NFR BLD: 0.8 %
INR PPP: 3.58 (ref 0.86–1.14)
KETONES UR STRIP-MCNC: NEGATIVE MG/DL
LEUKOCYTE ESTERASE UR QL STRIP: NEGATIVE
LIPASE SERPL-CCNC: 135 U/L (ref 73–393)
LYMPHOCYTES # BLD AUTO: 1.6 10E9/L (ref 0.8–5.3)
LYMPHOCYTES NFR BLD AUTO: 26.2 %
MCH RBC QN AUTO: 28.7 PG (ref 26.5–33)
MCHC RBC AUTO-ENTMCNC: 31.6 G/DL (ref 31.5–36.5)
MCV RBC AUTO: 91 FL (ref 78–100)
MONOCYTES # BLD AUTO: 0.5 10E9/L (ref 0–1.3)
MONOCYTES NFR BLD AUTO: 7.7 %
NEUTROPHILS # BLD AUTO: 4 10E9/L (ref 1.6–8.3)
NEUTROPHILS NFR BLD AUTO: 63.9 %
NITRATE UR QL: NEGATIVE
NRBC # BLD AUTO: 0 10*3/UL
NRBC BLD AUTO-RTO: 0 /100
PH UR STRIP: 6 PH (ref 5–7)
PLATELET # BLD AUTO: 111 10E9/L (ref 150–450)
POTASSIUM SERPL-SCNC: 3.6 MMOL/L (ref 3.4–5.3)
PROT SERPL-MCNC: 6.4 G/DL (ref 6.8–8.8)
RBC # BLD AUTO: 6.69 10E12/L (ref 3.8–5.2)
RBC #/AREA URNS AUTO: 1 /HPF (ref 0–2)
SODIUM SERPL-SCNC: 140 MMOL/L (ref 133–144)
SOURCE: ABNORMAL
SP GR UR STRIP: 1.01 (ref 1–1.03)
SQUAMOUS #/AREA URNS AUTO: 8 /HPF (ref 0–1)
TROPONIN I SERPL-MCNC: <0.015 UG/L (ref 0–0.04)
UROBILINOGEN UR STRIP-MCNC: 2 MG/DL (ref 0–2)
WBC # BLD AUTO: 6.3 10E9/L (ref 4–11)
WBC #/AREA URNS AUTO: 4 /HPF (ref 0–5)

## 2018-12-18 PROCEDURE — 93010 ELECTROCARDIOGRAM REPORT: CPT | Mod: Z6 | Performed by: STUDENT IN AN ORGANIZED HEALTH CARE EDUCATION/TRAINING PROGRAM

## 2018-12-18 PROCEDURE — 85610 PROTHROMBIN TIME: CPT | Performed by: STUDENT IN AN ORGANIZED HEALTH CARE EDUCATION/TRAINING PROGRAM

## 2018-12-18 PROCEDURE — 83690 ASSAY OF LIPASE: CPT | Performed by: STUDENT IN AN ORGANIZED HEALTH CARE EDUCATION/TRAINING PROGRAM

## 2018-12-18 PROCEDURE — 84484 ASSAY OF TROPONIN QUANT: CPT | Performed by: STUDENT IN AN ORGANIZED HEALTH CARE EDUCATION/TRAINING PROGRAM

## 2018-12-18 PROCEDURE — 99285 EMERGENCY DEPT VISIT HI MDM: CPT | Mod: 25 | Performed by: STUDENT IN AN ORGANIZED HEALTH CARE EDUCATION/TRAINING PROGRAM

## 2018-12-18 PROCEDURE — 74177 CT ABD & PELVIS W/CONTRAST: CPT

## 2018-12-18 PROCEDURE — 96374 THER/PROPH/DIAG INJ IV PUSH: CPT | Performed by: STUDENT IN AN ORGANIZED HEALTH CARE EDUCATION/TRAINING PROGRAM

## 2018-12-18 PROCEDURE — 25000128 H RX IP 250 OP 636: Performed by: STUDENT IN AN ORGANIZED HEALTH CARE EDUCATION/TRAINING PROGRAM

## 2018-12-18 PROCEDURE — 25000125 ZZHC RX 250: Performed by: STUDENT IN AN ORGANIZED HEALTH CARE EDUCATION/TRAINING PROGRAM

## 2018-12-18 PROCEDURE — 81001 URINALYSIS AUTO W/SCOPE: CPT | Performed by: STUDENT IN AN ORGANIZED HEALTH CARE EDUCATION/TRAINING PROGRAM

## 2018-12-18 PROCEDURE — 93005 ELECTROCARDIOGRAM TRACING: CPT | Performed by: STUDENT IN AN ORGANIZED HEALTH CARE EDUCATION/TRAINING PROGRAM

## 2018-12-18 PROCEDURE — 85025 COMPLETE CBC W/AUTO DIFF WBC: CPT | Performed by: STUDENT IN AN ORGANIZED HEALTH CARE EDUCATION/TRAINING PROGRAM

## 2018-12-18 PROCEDURE — 76705 ECHO EXAM OF ABDOMEN: CPT

## 2018-12-18 PROCEDURE — 80053 COMPREHEN METABOLIC PANEL: CPT | Performed by: STUDENT IN AN ORGANIZED HEALTH CARE EDUCATION/TRAINING PROGRAM

## 2018-12-18 PROCEDURE — 96361 HYDRATE IV INFUSION ADD-ON: CPT | Performed by: STUDENT IN AN ORGANIZED HEALTH CARE EDUCATION/TRAINING PROGRAM

## 2018-12-18 RX ORDER — GABAPENTIN 600 MG/1
TABLET ORAL
Qty: 90 TABLET | Refills: 0 | Status: SHIPPED | OUTPATIENT
Start: 2018-12-18 | End: 2019-01-23

## 2018-12-18 RX ORDER — ONDANSETRON 4 MG/1
4-8 TABLET, ORALLY DISINTEGRATING ORAL EVERY 8 HOURS PRN
Qty: 8 TABLET | Refills: 0 | Status: SHIPPED | OUTPATIENT
Start: 2018-12-18 | End: 2019-05-22

## 2018-12-18 RX ORDER — HYDROMORPHONE HYDROCHLORIDE 1 MG/ML
0.5 INJECTION, SOLUTION INTRAMUSCULAR; INTRAVENOUS; SUBCUTANEOUS
Status: DISCONTINUED | OUTPATIENT
Start: 2018-12-18 | End: 2018-12-18 | Stop reason: HOSPADM

## 2018-12-18 RX ORDER — IOPAMIDOL 755 MG/ML
100 INJECTION, SOLUTION INTRAVASCULAR ONCE
Status: COMPLETED | OUTPATIENT
Start: 2018-12-18 | End: 2018-12-18

## 2018-12-18 RX ORDER — LAMOTRIGINE 200 MG/1
TABLET ORAL
Qty: 30 TABLET | Refills: 0 | Status: SHIPPED | OUTPATIENT
Start: 2018-12-18 | End: 2019-01-23

## 2018-12-18 RX ORDER — MORPHINE SULFATE 15 MG/1
15 TABLET ORAL EVERY 6 HOURS PRN
Qty: 8 TABLET | Refills: 0 | Status: ON HOLD | OUTPATIENT
Start: 2018-12-18 | End: 2019-06-07

## 2018-12-18 RX ADMIN — Medication 0.5 MG: at 01:13

## 2018-12-18 RX ADMIN — SODIUM CHLORIDE 500 ML: 9 INJECTION, SOLUTION INTRAVENOUS at 03:32

## 2018-12-18 RX ADMIN — IOPAMIDOL 100 ML: 755 INJECTION, SOLUTION INTRAVENOUS at 02:50

## 2018-12-18 RX ADMIN — SODIUM CHLORIDE, POTASSIUM CHLORIDE, SODIUM LACTATE AND CALCIUM CHLORIDE 1000 ML: 600; 310; 30; 20 INJECTION, SOLUTION INTRAVENOUS at 01:11

## 2018-12-18 RX ADMIN — SODIUM CHLORIDE 67 ML: 9 INJECTION, SOLUTION INTRAVENOUS at 02:50

## 2018-12-18 ASSESSMENT — MIFFLIN-ST. JEOR: SCORE: 1519.21

## 2018-12-18 ASSESSMENT — ACTIVITIES OF DAILY LIVING (ADL): DEPENDENT_IADLS:: INDEPENDENT

## 2018-12-18 ASSESSMENT — ANXIETY QUESTIONNAIRES: GAD7 TOTAL SCORE: 9

## 2018-12-18 NOTE — TELEPHONE ENCOUNTER
"    Reason for Disposition    Difficulty breathing    Additional Information    Negative: Passed out (i.e., lost consciousness, collapsed and was not responding)    Negative: Shock suspected (e.g., cold/pale/clammy skin, too weak to stand, low BP, rapid pulse)    Negative: Sounds like a life-threatening emergency to the triager    Negative: Major injury to the back (e.g., MVA, fall > 10 feet or 3 meters, penetrating injury, etc.)    Negative: Followed a tailbone injury    [1] Pain in the upper back over the ribs (rib cage) AND [2] radiates (travels, goes) into chest    Negative: Severe difficulty breathing (e.g., struggling for each breath, speaks in single words)    Negative: Difficult to awaken or acting confused (e.g., disoriented, slurred speech)    Negative: Shock suspected (e.g., cold/pale/clammy skin, too weak to stand, low BP, rapid pulse)    Negative: [1] Chest pain lasts > 5 minutes AND [2] history of heart disease  (i.e., heart attack, bypass surgery, angina, angioplasty, CHF; not just a heart murmur)    Negative: [1] Chest pain lasts > 5 minutes AND [2] described as crushing, pressure-like, or heavy    Negative: [1] Chest pain lasts > 5 minutes AND [2] age > 50    Negative: [1] Chest pain lasts > 5 minutes AND [2] age > 30 AND [3] at least one cardiac risk factor (i.e., hypertension, diabetes, obesity, smoker or strong family history of heart disease)    Negative: [1] Chest pain lasts > 5 minutes AND [2] not relieved with nitroglycerin    Negative: Passed out (i.e., lost consciousness, collapsed and was not responding)    Negative: Heart beating < 50 beats per minute OR > 140 beats per minute    Negative: Visible sweat on face or sweat dripping down face    Negative: Sounds like a life-threatening emergency to the triager    Negative: Followed a chest injury    Negative: SEVERE chest pain    Negative: [1] Intermittent  chest pain or \"angina\" AND [2] increasing in severity or frequency  (Exception: pains " lasting a few seconds)    Negative: Pain also present in shoulder(s) or arm(s) or jaw  (Exception: pain is clearly made worse by movement)    Protocols used: CHEST PAIN-ADULT-AH, BACK PAIN-ADULT-AH  Caller states she is having severe pain in right upper back area around her ribs. Caller states she is having some difficulty breathing in as well, along with pain radiating. Caller rates pain 9/10, states took tylenol and it was non effective. Triage guidelines recommend to go to ED. Caller verbalized and understands directives.

## 2018-12-18 NOTE — ED AVS SNAPSHOT
[facility] Emergency Department  [address] [address]  Phone:  [phone]  Fax:  [phone]                                    Bia Bill   MRN: [id number]    Department:  [facility] Emergency Department   Date of Visit:  12/18/2018           After Visit Summary Signature Page    I have received my discharge instructions, and my questions have been answered. I have discussed any challenges I see with this plan with the nurse or doctor.    ..........................................................................................................................................  Patient/Patient Representative Signature      ..........................................................................................................................................  Patient Representative Print Name and Relationship to Patient    ..................................................               ................................................  Date                                   Time    ..........................................................................................................................................  Reviewed by Signature/Title    ...................................................              ..............................................  Date                                               Time          22EPIC Rev 08/18

## 2018-12-18 NOTE — TELEPHONE ENCOUNTER
GABAPENTIN 600 MG   TAB ASCE      Last Written Prescription Date:  11/19/18  Last Fill Quantity: 90,   # refills: 0  Last Office Visit: 10/3/18  Future Office visit:    Next 5 appointments (look out 90 days)    Jan 08, 2019  9:30 AM CST  Return Visit with [facility] ([facility] [address] [address]  [phone]   Jan 24, 2019 10:30 AM CST  Return Visit with [facility] ([facility]) [address] [address]  [phone]           lamoTRIgine (LAMICTAL) 200 MG tablet      Last Written Prescription Date:  11/19/18  Last Fill Quantity: 30,   # refills: 0  Last Office Visit: 10/3/18  Future Office visit:    Next 5 appointments (look out 90 days)    Jan 08, 2019  9:30 AM CST  Return Visit with [facility] ([facility]) [address] [address]  [phone]   Jan 24, 2019 10:30 AM CST  Return Visit with [facility] ([facility]) [address] [address]  [phone]           Requested Prescriptions   Pending Prescriptions Disp Refills     gabapentin (NEURONTIN) 600 MG tablet [Pharmacy Med Name: GABAPENTIN 600 MG   TAB ASCE] 90 tablet 0     Sig: TAKE ONE TABLET BY MOUTH THREE TIMES DAILY    There is no refill protocol information for this order        lamoTRIgine (LAMICTAL) 200 MG tablet [Pharmacy Med Name: LAMOTRIGINE 200 MG  TAB NORT] 30 tablet 0     Sig: TAKE ONE TABLET BY MOUTH ONE TIME DAILY    Anti-Seizure Meds Protocol  Failed - 12/18/2018  9:57 AM       Failed - Review Authorizing provider's last note.     Refer to last progress notes: confirm request is for original authorizing provider (cannot be through other providers).         Failed - Normal CBC on file in past 26 months    Recent Labs   Lab Test 12/18/18  0109   WBC 6.3   RBC 6.69*   HGB 19.2*   HCT  "60.8*   *                Failed - Normal platelet count on file in past 26 months    Recent Labs   Lab Test 12/18/18  0109   *              Passed - Recent (12 mo) or future (30 days) visit within the authorizing provider's specialty    Patient had office visit in the last 12 months or has a visit in the next 30 days with authorizing provider or within the authorizing provider's specialty.  See \"Patient Info\" tab in inbasket, or \"Choose Columns\" in Meds & Orders section of the refill encounter.             Passed - Normal ALT or AST on file in past 26 months    Recent Labs   Lab Test 12/18/18  0109   ALT 27     Recent Labs   Lab Test 12/18/18  0109   AST 22            Passed - No active pregnancy on record       Passed - No positive pregnancy test in last 12 months          "

## 2018-12-18 NOTE — LETTER
Health Care Home - Access Care Plan    About Me  Patient Name:  Bia Bill    YOB: 1965  Age:                             53 year old   Jose Ramon MRN:            [id number] Telephone Information:   Home Phone [phone]   Mobile [phone]       Address:    [address]  [place] [address] Email address:  No e-mail address on record      Emergency Contact(s)  Name Relationship Lgl Grd Work Phone Home Phone Mobile Phone   1. GREGORY CARLSON Significant ot*  none [phone]   2. MAE MUSTAFA Daughter  none [phone] none             Health Maintenance:   Health Maintenance Due   Topic Date Due     ADVANCE DIRECTIVE PLANNING Q5 YRS  12/03/1984     OP ANNUAL INR REFERRAL  12/20/2012     ZOSTER IMMUNIZATION (1 of 2) 12/03/2016     ASTHMA CONTROL TEST Q6 MOS  11/02/2018       My Access Plan  Medical Emergency 911   Questions or concerns during clinic hours Primary Clinic Line, I will call the clinic directly: [facility] [phone]   24 Hour Appointment Line [phone] or  [phone]) (toll free)   24 Hour Nurse Line [phone] (toll free)   Questions or concerns outside clinic hours 24 Hour Appointment Line, I will call the after-hours on-call line:   [facility] [phone] or [phone]) (toll-free)   Preferred Urgent Care [facility] [phone]   Preferred Hospital [place], [place]  [phone]   Preferred Pharmacy [place], [place] - [address]     Behavioral Health Crisis Line The National Suicide Prevention Lifeline at 1-283.941.3857 or 451     My Care Team Members  Patient Care Team       Relationship Specialty Notifications Start End    Kd Leong MD PCP - General Family Practice  5/26/16     Phone: [phone] Fax: [phone] [address] [facility] [address]    [facility]  Worker   8/19/16     [facility]     Phone: [phone]         Patel Gray, RN Clinic Care Coordinator Primary Care - CC  12/18/18     Phone: [phone] Fax: [phone] [address] [place] [address]           My Medical and Care Information  Problem List   Patient Active Problem List   Diagnosis     Mild persistent asthma     Polyneuropathy in other diseases classified elsewhere (H)     DVT (deep venous thrombosis) (H)     Alcohol abuse, in remission     SECONDARY POLYCYTHEMIA     Chondromalacia of patella     Sensorineural hearing loss, asymmetrical     Developmental reading disorder     Esophageal reflux     Hypothyroidism     Fatty liver     GILES (Obstructive Sleep Apnea)-Moderate (AHI 16)     RLS (restless legs syndrome)     Smoker     Erythrocytosis     Moderate mixed bipolar I disorder (H)     Personality disorder, depressive     COPD (chronic obstructive pulmonary disease) (H)     Current use of long term anticoagulation     Rosacea     Health Care Home     DDD (degenerative disc disease), cervical     Benign neoplasm of colon (POLYPOSIS)     Stroke (H)     Somatization disorder     Sebaceous cyst of right axilla     HTN, goal below 140/90     Left leg pain     Vitamin D deficiency     Long term current use of anticoagulant therapy     Morbid obesity (H)     PVD (peripheral vascular disease) with claudication (H)     Cyst of left ovary     Morbid obesity with alveolar hypoventilation (H)     GI bleed     Severe episode of recurrent major depressive disorder (H)     Conductive hearing loss of left ear with restricted hearing of right ear     Sensorineural hearing loss (SNHL) of right ear with restricted hearing of left ear     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      Current Medications and Allergies:  See printed Medication Report
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
## 2018-12-18 NOTE — ED NOTES
Patient  Attempting to put boots on yesterday  Bent over  Had sharp pain  States pain is getting worse.  Denies headache.  Hurts to cough or take a deep breath.  Abdomin feels distended  Pain shots in upper abdomin area  From back  With movement HX of nerve damage in upper left leg 2004  From DVT.  Denies any problems with mental health at this time has been very good about taking medications

## 2018-12-18 NOTE — LETTER
[place] CARE COORDINATION  [facility]  [address] [place] [address]  [phone]    December 18, 2018    Bia Bill  [address]  [facility] [id number]      Dear Bia,    I am a clinic care coordinator who works with Kd Leong MD at [facility]. I wanted to thank you for spending the time to talk with me.  I wanted to introduce myself and provide you with my contact information so that you can call me with questions or concerns about your health care. Below is a description of clinic care coordination and how I can further assist you.     The clinic care coordinator is a registered nurse and/or  who understand the health care system. The goal of clinic care coordination is to help you manage your health and improve access to the [facility] system in the most efficient manner. The registered nurse can assist you in meeting your health care goals by providing education, coordinating services, and strengthening the communication among your providers. The  can assist you with financial, behavioral, psychosocial, chemical dependency, counseling, and/or psychiatric resources.    Please feel free to contact me at [phone], [phone], with any questions or concerns. We at [facility] are focused on providing you with the highest-quality healthcare experience possible and that all starts with you.     Sincerely,     Wei Gray RN  Clinic Care Coordinator    Enclosed: I have enclosed a copy of a 24 Hour Access Plan. This has helpful phone numbers for you to call when needed. Please keep this in an easy to access place to use as needed.

## 2018-12-18 NOTE — TELEPHONE ENCOUNTER
Routing refill request to provider for review/approval because:  Drug not on the [facility] refill protocol   PAtient was seen in ED today  Mundo Conde RN

## 2018-12-18 NOTE — PROGRESS NOTES
Clinic Care Coordination Contact    Clinic Care Coordination Contact  OUTREACH    Referral Information:  Referral Source: ED Follow-Up    Primary Diagnosis: GI Disorders    Chief Complaint   Patient presents with     Clinic Care Coordination - Post Hospital     Care Team        [place] Utilization:   Clinic Utilization  Difficulty keeping appointments:: No  Compliance Concerns: No  No-Show Concerns: No  No PCP office visit in Past Year: No  Utilization    Last refreshed: 12/18/2018 10:29 AM:  Hospital Admissions 0           Last refreshed: 12/18/2018 10:29 AM:  ED Visits 4           Last refreshed: 12/18/2018 10:29 AM:  No Show Count (past year) 3              Current as of: 12/18/2018 10:29 AM              Clinical Concerns:  Current Medical Concerns:  Patient reports her pain is about the same as when she was in the ED. Patient has not been able to  pain meds from the pharmacy and intends on doing this today. Patient is able to eat and drink. Patient has made surgical consult follow up for tomorrow. No other concerns reported at this time.      Current Behavioral Concerns: none    Education Provided to patient: Encouraged follow up appointment with PCP.   Pain  Pain (GOAL):: Yes  Type: Acute (<3mo)  Location of chronic pain:: abdomen  Radiating: No  Progression: Unchanged  Description of pain: Aching  Chronic pain severity:: 5  Limitation of routine activities due to chronic pain:: Yes  Description: Able to do most things most days with some rest  Alleviating Factors: Rest, Pain Medication  Aggravating Factors: Activity, Positioning    Medication Management:  Patient is knowledgeable on medications and is adherent. No financial concerns reported at this time.        Functional Status:  Dependent ADLs:: Independent  Dependent IADLs:: Independent  Bed or wheelchair confined:: No  Mobility Status: Independent  Fallen 2 or more times in the past year?: No  Any fall with injury in the past year?: No    Living  Situation:  Current living arrangement:: I live in a private home with family  Type of residence:: Private home - stairs    Diet/Exercise/Sleep:  Diet:: Regular  Inadequate nutrition (GOAL):: No  Food Insecurity: No  Tube Feeding: No  Exercise:: Currently not exercising    Transportation:  Transportation concerns (GOAL):: No  Transportation means:: Regular car     Psychosocial:  Jew or spiritual beliefs that impact treatment:: No  Mental health DX:: No  Mental health management concern (GOAL):: No  Informal Support system:: Family, Friends     Financial/Insurance:   Financial/Insurance concerns (GOAL):: No       Resources and Interventions:  Current Resources:    ;   Community Resources: None  Supplies used at home:: None  Equipment Currently Used at Home: none         Referrals Placed: None          Future Appointments              Tomorrow Aaron Siegel MD [facility]    In 3 weeks [facility], [facility]    In 1 month [facility], [facility]    In 3 months [facility]    In 3 months Joshua Watts MD [facility]          Plan: 1) Patient will continue to follow treatment plan as directed and follow up with PCP with concerns ongoing.   2) Care Coordination to remain available for future needs. Follow up planned for later this week unless otherwise notified.     Wei Gray RN  Clinic Care Coordinator  [phone] or [phone]

## 2018-12-19 ENCOUNTER — TELEPHONE (OUTPATIENT)
Dept: SURGERY | Facility: CLINIC | Age: 53
End: 2018-12-19

## 2018-12-19 ENCOUNTER — OFFICE VISIT (OUTPATIENT)
Dept: SURGERY | Facility: CLINIC | Age: 53
End: 2018-12-19
Payer: COMMERCIAL

## 2018-12-19 VITALS
HEIGHT: 58 IN | WEIGHT: 231 LBS | HEART RATE: 104 BPM | TEMPERATURE: 99.6 F | DIASTOLIC BLOOD PRESSURE: 77 MMHG | BODY MASS INDEX: 48.49 KG/M2 | SYSTOLIC BLOOD PRESSURE: 135 MMHG

## 2018-12-19 DIAGNOSIS — K80.20 GALLSTONES: Primary | ICD-10-CM

## 2018-12-19 PROCEDURE — 99244 OFF/OP CNSLTJ NEW/EST MOD 40: CPT | Performed by: SURGERY

## 2018-12-19 RX ORDER — HYDROCODONE BITARTRATE AND ACETAMINOPHEN 5; 325 MG/1; MG/1
1-2 TABLET ORAL EVERY 6 HOURS PRN
Qty: 30 TABLET | Refills: 0 | Status: ON HOLD | OUTPATIENT
Start: 2018-12-19 | End: 2019-01-04

## 2018-12-19 ASSESSMENT — MIFFLIN-ST. JEOR: SCORE: 1547.56

## 2018-12-19 NOTE — TELEPHONE ENCOUNTER
I spoke to Ludivina today.  I explained that her mother should avoid greasy, fatty or spicy food. Fast food is usually a very poor choice. I have looked at Norah's liver function and it is good. I told Ludivina that although not expected there is always UC ER if her discomfort increases before the 2 weeks is up. They can also call back if there is questions or concerns. Lindsey COLVIN Rn

## 2018-12-19 NOTE — PROGRESS NOTES
53-year-old female complaining of one-week history of right upper quadrant abdominal pain with radiation to the back.  Patient reports the pain is constant and has been going on almost exclusively for the past 5 days.  Pain is currently 4 out of 10 with radiation to the back.  Patient has nausea and vomiting and reports normal bowel movements.  Pain is made worse by eating greasy foods.  No real alleviating factors.  The patient has never had similar symptoms in the past.  She was in the emergency room last night.    Patient Active Problem List   Diagnosis     Mild persistent asthma     Polyneuropathy in other diseases classified elsewhere (H)     DVT (deep venous thrombosis) (H)     Alcohol abuse, in remission     SECONDARY POLYCYTHEMIA     Chondromalacia of patella     Sensorineural hearing loss, asymmetrical     Developmental reading disorder     Esophageal reflux     Hypothyroidism     Fatty liver     GILES (Obstructive Sleep Apnea)-Moderate (AHI 16)     RLS (restless legs syndrome)     Smoker     Erythrocytosis     Moderate mixed bipolar I disorder (H)     Personality disorder, depressive     COPD (chronic obstructive pulmonary disease) (H)     Current use of long term anticoagulation     Rosacea     Health Care Home     DDD (degenerative disc disease), cervical     Benign neoplasm of colon (POLYPOSIS)     Stroke (H)     Somatization disorder     Sebaceous cyst of right axilla     HTN, goal below 140/90     Left leg pain     Vitamin D deficiency     Long term current use of anticoagulant therapy     Morbid obesity (H)     PVD (peripheral vascular disease) with claudication (H)     Cyst of left ovary     Morbid obesity with alveolar hypoventilation (H)     GI bleed     Severe episode of recurrent major depressive disorder (H)     Conductive hearing loss of left ear with restricted hearing of right ear     Sensorineural hearing loss (SNHL) of right ear with restricted hearing of left ear     Chronic gout of hand  due to renal impairment without tophus, unspecified laterality       Past Medical History:   Diagnosis Date     Acromioclavicular joint arthritis 1/25/2017     Acute bronchospasm 8/15/2016     Acute gouty arthritis 8/4/2014     Anxiety state, unspecified      Bipolar I disorder, most recent episode (or current) unspecified      Closed fracture of metatarsal bone 6/5/2007     Depressive disorder, not elsewhere classified      DVT, lower extremity (H)      Headache 10/17/2014     Impingement syndrome of shoulder region 1/25/2017     Polycythemia, secondary     [place] HGB      Right shoulder pain 10/14/2014     Trochanteric bursitis of both hips 1/11/2016       Past Surgical History:   Procedure Laterality Date     BILIARY STENT SINGLE USE COV      3 stints in left leg     BONE MARROW BIOPSY, BONE SPECIMEN, NEEDLE/TROCAR N/A 11/17/2014    Procedure: BIOPSY BONE MARROW;  Surgeon: Hay Aaron MD;  Location: [facility]     D & C  10/26/09    with uterine ablation     ESOPHAGOSCOPY, GASTROSCOPY, DUODENOSCOPY (EGD), COMBINED  4/21/2014    Procedure: Gastroscopy;  Surgeon: Moris Thomas MD;  Location: [facility]     HYSTERECTOMY, PAP NO LONGER INDICATED  1-4-2010     LITHOTRIPSY  2004    Lithotrypsy       Family History   Problem Relation Age of Onset     Hypertension Mother      Diabetes Mother      Blood Disease Mother      Heart Disease Mother         chf     Cerebrovascular Disease Mother      Hypertension Father      Cancer Father         lung cancer     Allergies Sister      Diabetes Sister      Depression Sister      Hypertension Sister        Social History     Tobacco Use     Smoking status: Current Every Day Smoker     Packs/day: 0.25     Years: 34.00     Pack years: 8.50     Types: Cigarettes     Smokeless tobacco: Never Used     Tobacco comment: started at age 11.  Quit during pregnancyX4.  Quit 3 months ago.    Substance Use Topics     Alcohol use: No     Alcohol/week: 0.0 oz     Comment: quit  1995        History   Drug Use No     Comment: past hx 22 years ago/ uppers downers, canibus       Current Outpatient Medications   Medication Sig Dispense Refill     acetaminophen (TYLENOL) 500 MG tablet Take 500-1,000 mg by mouth every 6 hours as needed for mild pain       albuterol (2.5 MG/3ML) 0.083% neb solution Take 1 vial (2.5 mg) by nebulization every 6 hours as needed for shortness of breath / dyspnea or wheezing 1 Box 0     albuterol (PROAIR HFA/PROVENTIL HFA/VENTOLIN HFA) 108 (90 BASE) MCG/ACT Inhaler Inhale 2 puffs into the lungs every 6 hours as needed for shortness of breath / dyspnea or wheezing 3 Inhaler 1     allopurinol (ZYLOPRIM) 300 MG tablet Take 1 tablet (300 mg) by mouth daily 90 tablet 0     ARIPiprazole (ABILIFY) 10 MG tablet Take 1 tablet (10 mg) by mouth daily 90 tablet 1     buPROPion (WELLBUTRIN XL) 150 MG 24 hr tablet TAKE  ONE TABLET BY MOUTH EVERY DAY IN THE MORNING 30 tablet 0     EPINEPHrine (EPIPEN/ADRENACLICK/OR ANY BX GENERIC EQUIV) 0.3 MG/0.3ML injection 2-pack Inject 0.3 mLs (0.3 mg) into the muscle once as needed for anaphylaxis 0.6 mL 0     furosemide (LASIX) 20 MG tablet TAKE TWO TABLETS BY MOUTH TWICE DAILY 60 tablet 1     gabapentin (NEURONTIN) 300 MG capsule ONE CAPSULE BY MOUTH AT BEDTIME WITH 600 MG CAPSULE- FOR TOTAL  MG AT BEDTIME 90 capsule 2     gabapentin (NEURONTIN) 600 MG tablet TAKE ONE TABLET BY MOUTH THREE TIMES DAILY 90 tablet 0     ipratropium - albuterol 0.5 mg/2.5 mg/3 mL (DUONEB) 0.5-2.5 (3) MG/3ML neb solution Take 1 vial (3 mLs) by nebulization every 6 hours as needed for shortness of breath / dyspnea or wheezing 30 vial 1     lamoTRIgine (LAMICTAL) 200 MG tablet TAKE ONE TABLET BY MOUTH ONE TIME DAILY 30 tablet 0     levothyroxine (SYNTHROID/LEVOTHROID) 150 MCG tablet Take 1 tablet (150 mcg) by mouth daily 90 tablet 2     lidocaine (XYLOCAINE) 5 % ointment Apply topically 3 times daily as needed for moderate pain 240 g 1     mometasone-formoterol  "(DULERA) 200-5 MCG/ACT oral inhaler Inhale 2 puffs into the lungs 2 times daily 13 g 1     morphine (MSIR) 15 MG IR tablet Take 1 tablet (15 mg) by mouth every 6 hours as needed for severe pain 8 tablet 0     omeprazole (PRILOSEC) 20 MG CR capsule TAKE ONE CAPSULE BY MOUTH TWICE DAILY 180 capsule 3     ondansetron (ZOFRAN ODT) 4 MG ODT tab Take 1-2 tablets (4-8 mg) by mouth every 8 hours as needed for nausea or vomiting 8 tablet 0     order for DME Equipment being ordered:x2 [employer] 30/40mmHg compression wraps with x2 extra prs of compression liners 1 each 0     order for DME Equipment being ordered: Nebulizer 1 Device 0     order for DME Equipment being ordered: CPAP  AIRSENSE 10  5-18 CM H20  SN# [id number]   DN# [id number]       order for DME Equipment being ordered: [id number]\" x2pair     \"20-30mmHg [place] Hybrid Liners\" x 2 pair 2 Units 11     order for DME Equipment being ordered: DEPENDS SIZE LARGE 60 Month 5     order for DME Equipment being ordered: INCONTINENCE PADS   QID 1 Month 11     potassium chloride SA (K-DUR/KLOR-CON M) 10 MEQ CR tablet TAKE ONE TABLET BY MOUTH ONE TIME DAILY 90 tablet 3     pramipexole (MIRAPEX) 0.125 MG tablet TAKE 1-2 TABLETS BY MOUTH AT BEDTIME 60 tablet 10     simvastatin (ZOCOR) 20 MG tablet TAKE ONE TABLET BY MOUTH AT BEDTIME 90 tablet 1     traZODone (DESYREL) 150 MG tablet TAKE ONE TABLET BY MOUTH AT BEDTIME 30 tablet 0     VITAMIN D3 1000 units tablet TAKE ONE TABLET BY MOUTH ONE TIME DAILY 30 tablet 2     warfarin (COUMADIN) 5 MG tablet Take 10 mg on Mon, Wed, Fri; 12.5 mg all other days or As directed by Anticoagulation clinic 205 tablet 0       Allergies   Allergen Reactions     Darvocet [Propoxyphene N-Apap] Anaphylaxis     Darvocet,Percocet      Percocet [Oxycodone-Acetaminophen] Anaphylaxis, Hives and Swelling     Has tolerated hydromorphone in the past.      Asa [Aspirin] Hives     Aspirin causes seizures and hives, throat swelling.   Has tolerated ketorolac in the " past.        Bee      Ibuprofen Swelling     Throat swelling per patient      Lyrica [Pregabalin] Other (See Comments) and Swelling     dizziness     Povidone Iodine Rash     Reaction to topical betadine     Tape [Adhesive Tape] Rash      CBC  Recent Labs   Lab Test 12/18/18  0109   WBC 6.3   RBC 6.69*   HGB 19.2*   HCT 60.8*   MCV 91   MCH 28.7   MCHC 31.6   RDW 21.4*   *       BMP  Recent Labs   Lab Test 12/18/18  0109      POTASSIUM 3.6   HOOD 8.1*   CHLORIDE 105   CO2 28   BUN 12   CR 0.74   *       LFTs  Recent Labs   Lab Test 12/18/18  0109   PROTTOTAL 6.4*   ALBUMIN 3.3*   BILITOTAL 0.5   ALKPHOS 135   AST 22   ALT 27     Results for orders placed or performed during the hospital encounter of 12/18/18   Abdomen US, limited (RUQ only)    Narrative    US ABDOMEN LIMITED  12/18/2018 1:56 AM    CLINICAL INFORMATION: Pain, RUQ pain.    COMPARISON: CT abdomen 8/25/2018.    FINDINGS: Limited right upper quadrant ultrasound demonstrates  multiple tiny gallstones. No gallbladder wall thickening or  sonographic Escalona's sign. No bile duct dilatation. Increased liver  echogenicity suggesting fatty infiltration. Visualized portions of the   pancreas are negative. The visualized portion of the right kidney is  unremarkable. No hydronephrosis on the right.      Impression    IMPRESSION:  1. Cholelithiasis without sonographic evidence of cholecystitis.  2. Fatty infiltration of the liver.    JEAN WATERS MD   CT Abdomen Pelvis w Contrast    Narrative    CT ABDOMEN PELVIS W CONTRAST  12/18/2018 3:02 AM     HISTORY: Nausea, vomiting; right flank pain, RUQ abdominal pain.    TECHNIQUE: Volumetric acquisition through abdomen and pelvis with IV  contrast. 100 mL Isovue-370. Radiation dose for this scan was reduced  using automated exposure control, adjustment of the mA and/or kV  according to patient size, or iterative reconstruction technique.    COMPARISON: CT 8/25/2018. Ultrasound  12/18/2018.    FINDINGS: Liver is slightly enlarged measuring 21 cm craniocaudad with  diffuse fatty infiltration. Enlarged spleen measuring 16.7 cm  craniocaudad. Tiny gallstones better seen previously and on the recent  ultrasound. Pancreas, adrenal glands and kidneys demonstrate no  worrisome findings. Low attenuation subcentimeter renal lesion(s).  These are compatible with small benign cysts and no specific imaging  evaluation or follow-up is recommended. Tiny nonobstructing stone in  the lower pole of each kidney.    Mild atelectasis at the right lung base.    Uterus is absent. 2.4 cm low-density left ovarian lesion of doubtful  clinical significance. If desired, this could be further evaluated  with follow-up outpatient pelvic ultrasound. No bowel obstruction or  ascites. Left iliac vein stent.      Impression    IMPRESSION:  1. No acute abnormality.  2. Cholelithiasis.  3. Mild hepatosplenomegaly and fatty infiltration of the liver.  4. Small left ovarian lesion of doubtful clinical significance.    JEAN WATERS MD     *Note: Due to a large number of results and/or encounters for the requested time period, some results have not been displayed. A complete set of results can be found in Results Review.     ROS  Constitutional - Denies fevers, weight loss, malaise, lethargy  Neuro - Denies tremors or seizures  Pulmon - Denies SOB, dyspnea, hemoptysis, chronic cough or use of an inhaler  CV - Denies CP, SOB, lower extremity edema, difficulty w/ stairs, has never used NTG  GI - Denies hematemesis, BRBPR, melena, chronic diarrhea or epigastric pain   - Denies hematuria, difficulty voiding, h/o STDs  Hematology - Denies blood clotting disorders, chronic anemias  Dermatology - No melanomas or skin cancers  Rheumatology - No h/o RA  Pysch - Denies depression, bipolar d/o or schizophrenia    Exam:/77 (BP Location: Right arm, Patient Position: Chair, Cuff Size: Adult Regular)   Pulse 104   Temp 99.6  F  "(37.6  C) (Tympanic)   Ht 1.473 m (4' 10\")   Wt 104.8 kg (231 lb)   LMP 09/27/2009   BMI 48.28 kg/m      General - Alert and Oriented X4, NAD, well nourished  HEENT - Normocephalic, atraumatic, PERRLA, Nose midline, Throat without lesions  Neck - supple, no LAD, Thyroid non-palpable, Carotids without bruits  Lungs - Clear to auscultation bilaterally with good inspiratory effort, no tactile fremitus  CV - Heart RRR, no lift's, thrills, murmurs, rubs, or gallops. Carotid, radial, and femoral pulses 2+ bilaterally  Abdomen - Soft, non-tender, +BS, no hepatosplenomegaly, no palpable masses  Neuro - Full ROM, Strength 5/5 and major muscle groups, sensation intact  Extremities - No cyanosis, clubbing or edema    A/P: 53-year-old female with symptomatic cholelithiasis.  Patient is a good candidate for laparoscopic cholecystectomy.  Risks benefits alternatives and complications were discussed with the patient including the possibility of infection bleeding or bile leak.  Patient understood and wished to proceed.  Patient will need to be off her Coumadin for at least 1 week prior to surgery.  I have asked the patient to get a preop from her primary care provider.    Aaron Siegel MD   "

## 2018-12-19 NOTE — TELEPHONE ENCOUNTER
"Reason for Call:  Other call back    Detailed comments: Pt's daughter is calling in - Ludivina - is very concerned that pt is not having surgery today.  Was in today and was told they would do surgery on 1-4-19.  Daughter thinks she cannot wait this long \"I am not happy\" \"the surgery should have been done as soon as possible so things don't get worse for her\"    Phone Number Patient can be reached at: [phone] is pt's cell - daughter is the one that \"wants answers\" - Ludivina [phone] [facility] signed Oct 2012.    Best Time: any    Can we leave a detailed message on this number? Not Applicable    Call taken on 12/19/2018 at 2:41 PM by Alicja Ulrich      "

## 2018-12-19 NOTE — PROGRESS NOTES
Progress Note  Disclaimer: This note consists of symbols derived from keyboarding, dictation and/or voice recognition software. As a result, there may be errors in the script that have gone undetected. Please consider this when interpreting information found in this chart.    Client Name: Bia Bill  Date: 2018         Service Type: Individual      Session Start Time: 9:30 AM  Session End Time: 10:15 AM      Session Length: 45     Session #: 24     Attendees: Client attended alone    Treatment Plan Last Reviewed: 10/24/2017  PHQ-9 / CHIN-7 : 10     DATA   Client reported she has been arguing with her sister over the inheritance of her mobile home.  She is having to transfer the title from her  parents in order to do repairs on the residence.  She is taking care of herself by exercising, walking sewing and reading.  Reporting she is reading more than she ever has in her life.     Progress Since Last Session (Related to Symptoms / Goals / Homework):   Symptoms: Stable    Homework: Achieved / completed to satisfaction      Episode of Care Goals: Satisfactory progress - ACTION (Actively working towards change); Intervened by reinforcing change plan / affirming steps taken     Current / Ongoing Stressors and Concerns:   Recurrent depression  family relational problems, medical problems, chronic pain, trauma history      Treatment Objective(s) Addressed in This Session:   Increase interest, engagement, and pleasure in doing things  Decrease frequency and intensity of feeling down, depressed, hopeless       Intervention:   CBT: Behavioral activation noted the times since our last meeting when she has asserted and stuck by her boundaries.     ASSESSMENT: Current Emotional / Mental Status (status of significant symptoms):   Risk status (Self / Other harm or suicidal ideation)   Client denies current fears or concerns for personal safety.   Client denies current  Encounter addended by: Ty Orr, OT on: 12/19/2018 2:43 PM   Actions taken: Flowsheet data copied forward, Flowsheet accepted, Charge Capture section accepted, Document created, Document edited, Sign clinical note, Episode resolved or recent suicidal ideation or behaviors.   Client denies current or recent homicidal ideation or behaviors.   Client denies current or recent self injurious behavior or ideation.   Client denies other safety concerns.   A safety and risk management plan has not been developed at this time, however client was given the after-hours number / 911 should there be a change in any of these risk factors.     Appearance:   Appropriate    Eye Contact:   Good    Psychomotor Behavior: Normal    Attitude:   Cooperative    Orientation:   All   Speech    Rate / Production: Normal     Volume:  Normal    Mood:    Normal   Affect:    Appropriate    Thought Content:  Clear    Thought Form:  Coherent  Logical    Insight:    Good      Medication Review:   No changes to current psychiatric medication(s)     Medication Compliance:   Yes     Changes in Health Issues:   None reported     Chemical Use Review:   Substance Use: Chemical use reviewed, no active concerns identified      Tobacco Use: Client reports 5 cigarettes per day      Collateral Reports Completed:   Not Applicable    PLAN: (Client Tasks / Therapist Tasks / Other)  Client to continue With her self-care routine and maintenance of boundaries.   Maintain medication compliance and contact with psychiatry.  .  Kd Morris                                                         ________________________________________________________________________    Treatment Plan    Client's Name: Bai Bill  YOB: 1965    Date: 10/24/2017      DSM5 Diagnoses: (Sustained by DSM5 Criteria Listed Above)  Diagnoses: 296.40 Bipolar I Disorder Current or Most Recent Episode Manic, Unspecified  Psychosocial & Contextual Factors: financial difficulties, chronic pain, roommate issues  WHODAS 2.0 (12 item)               This questionnaire asks about difficulties due to health conditions. Health conditions             include disease or illnesses, other health problems that may  be short or long lasting,             injuries, mental health or emotional problems, and problems with alcohol or drugs.                         Think back over the past 30 days and answer these questions, thinking about how much             difficulty you had doing the following activities. For each question, please Elim IRA only             one response.      S1  Standing for long periods such as 30 minutes?  Mild =           2    S2  Taking care of household responsibilities?  Moderate =   3    S3  Learning a new task, for example, learning how to get to a new place?  Mild =           2    S4  How much of a problem do you have joining community activities (for example, festivals, [employer] or other activities) in the same way as anyone else can?  Moderate =   3    S5  How much have you been emotionally affected by your health problems?  Mild =           2        In the past 30 days, how much difficulty did you have in:    S6  Concentrating on doing something for ten minutes?  Mild =           2    S7  Walking a long distance such as a kilometer (or equivalent)?  Severe =       4    S8  Washing your whole body?  None =         1    S9  Getting dressed?  None =         1    S10  Dealing with people you do not know?  Moderate =   3    S11  Maintaining a friendship?  Severe =       4    S12  Your day to day work?  Moderate =   3       H1  Overall, in the past 30 days, how many days were these difficulties present?  Record number of days seven    H2  In the past 30 days, for how many days were you totally unable to carry out your usual activities or work because of any health condition?  Record number of days  seven    H3  In the past 30 days, not counting the days that you were totally unable, for how many days did you cut back or reduce your usual activities or work because of any health condition?  Record number of days five                   Referral / Collaboration:  Referral to another professional/service is not  indicated at this time..    Anticipated number of session or this episode of care: 15    Goals  1. Education- the Biopsychosocial model of depression  a. Client will be able to describe how depression is effecting them physically, emotionally and socially  2. Behavioral Activation  a. Client will learn to assess their depression on a day to day basis  b. Client will Identify two forms of exercise/activity and engage in them 3 times per week  c. Client will Identify 3 things that make them laugh, and engage in these 5 times per week.  d. Client will Identify 1-2Creative activities or hobbies  and engage in them 2 times per week  e. Client will identify music, movies, books that make them feel good and use them 3-4 times per week  3. Self-care  a. Client will identify 5 things they can do just for themselves  b. Client will take time for quiet, reflection, meditation 5 times per week  c. Client will Learn to set boundaries when appropriate  d. Client will Identify 2 individuals they can call on for support, distraction  4. Assessment of progress  a. Client will engage in assessment of their progress on a regular basis    Bipolar Disorder  Treatment plan:  1. Education- the Biopsychosocial model of Bipolar Disorder    a. Client will be able to describe in general terms what Bipolar Disorder  is and is not  b. Client will be able to describe how Bipolar Disorder  has affected their life in at least two different areas, such as school or work and Home/relationships  c. Clients parents/guardians or significant others will be provided information on what Bipolar Disorder  is and the ways it can affect relationships and be encouraged to be a part of clients treatment team.  2. Medication  a. Client will participate in medication evaluation for Bipolar Disorder  symptoms and follow medication recommendations.   3. Identification and management of triggers  a. Client and therapist will examine patient s history to determine  if there are predictable triggers for manic or depressive episodes (e.g. boredom, anger, family stress)  b. Client and therapist will develop means of diffusing these triggers (e.g. relaxation strategies, boundary setting, anger management)  4. Comorbid conditions   a. Client and therapist will asses for comorbid conditions (e.g. anxiety, depression, substance use). and add additional items to treatment plan as needed  5. Self-care  a. Client will identify 3 things they can do just for themselves  b. Client will take time for quiet, reflection, meditation 3 times per week  c. Client will Learn to set boundaries when appropriate  d. Client will Identify 2 individuals they can call on for support, distraction  5. Behavioral Activation  a. Client will Identify two forms of exercise/activity and engage in them 3 times per week  b. Client will Identify 2 things that make them laugh, and engage in these 3 times per week.  c. Client will Identify 2 Creative activities or hobbies  and engage in them 2 times per week  d. Client will identify music, movies, books that make them feel good and use them 3 times per week  6. Assessment of progress  a. Client will engage in assessment of their progress on a regular basis    Client has reviewed and agreed to the above plan.      Kd Morris  10/24/2017 12/27/2017

## 2018-12-19 NOTE — LETTER
12/19/2018         RE: Bia Bill  [address]  [facility] [address]        Dear Colleague,    Thank you for referring your patient, Bia Bill, to the [facility]. Please see a copy of my visit note below.    53-year-old female complaining of one-week history of right upper quadrant abdominal pain with radiation to the back.  Patient reports the pain is constant and has been going on almost exclusively for the past 5 days.  Pain is currently 4 out of 10 with radiation to the back.  Patient has nausea and vomiting and reports normal bowel movements.  Pain is made worse by eating greasy foods.  No real alleviating factors.  The patient has never had similar symptoms in the past.  She was in the emergency room last night.    Patient Active Problem List   Diagnosis     Mild persistent asthma     Polyneuropathy in other diseases classified elsewhere (H)     DVT (deep venous thrombosis) (H)     Alcohol abuse, in remission     SECONDARY POLYCYTHEMIA     Chondromalacia of patella     Sensorineural hearing loss, asymmetrical     Developmental reading disorder     Esophageal reflux     Hypothyroidism     Fatty liver     GILES (Obstructive Sleep Apnea)-Moderate (AHI 16)     RLS (restless legs syndrome)     Smoker     Erythrocytosis     Moderate mixed bipolar I disorder (H)     Personality disorder, depressive     COPD (chronic obstructive pulmonary disease) (H)     Current use of long term anticoagulation     Rosacea     Health Care Home     DDD (degenerative disc disease), cervical     Benign neoplasm of colon (POLYPOSIS)     Stroke (H)     Somatization disorder     Sebaceous cyst of right axilla     HTN, goal below 140/90     Left leg pain     Vitamin D deficiency     Long term current use of anticoagulant therapy     Morbid obesity (H)     PVD (peripheral vascular disease) with claudication (H)     Cyst of left ovary     Morbid obesity with alveolar hypoventilation (H)     GI  bleed     Severe episode of recurrent major depressive disorder (H)     Conductive hearing loss of left ear with restricted hearing of right ear     Sensorineural hearing loss (SNHL) of right ear with restricted hearing of left ear     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       Past Medical History:   Diagnosis Date     Acromioclavicular joint arthritis 1/25/2017     Acute bronchospasm 8/15/2016     Acute gouty arthritis 8/4/2014     Anxiety state, unspecified      Bipolar I disorder, most recent episode (or current) unspecified      Closed fracture of metatarsal bone 6/5/2007     Depressive disorder, not elsewhere classified      DVT, lower extremity (H)      Headache 10/17/2014     Impingement syndrome of shoulder region 1/25/2017     Polycythemia, secondary     [place] HGB      Right shoulder pain 10/14/2014     Trochanteric bursitis of both hips 1/11/2016       Past Surgical History:   Procedure Laterality Date     BILIARY STENT SINGLE USE COV      3 stints in left leg     BONE MARROW BIOPSY, BONE SPECIMEN, NEEDLE/TROCAR N/A 11/17/2014    Procedure: BIOPSY BONE MARROW;  Surgeon: Hay Aaron MD;  Location: [facility]     D & C  10/26/09    with uterine ablation     ESOPHAGOSCOPY, GASTROSCOPY, DUODENOSCOPY (EGD), COMBINED  4/21/2014    Procedure: Gastroscopy;  Surgeon: Moris Thomas MD;  Location: [facility]     HYSTERECTOMY, PAP NO LONGER INDICATED  1-4-2010     LITHOTRIPSY  2004    Lithotrypsy       Family History   Problem Relation Age of Onset     Hypertension Mother      Diabetes Mother      Blood Disease Mother      Heart Disease Mother         chf     Cerebrovascular Disease Mother      Hypertension Father      Cancer Father         lung cancer     Allergies Sister      Diabetes Sister      Depression Sister      Hypertension Sister        Social History     Tobacco Use     Smoking status: Current Every Day Smoker     Packs/day: 0.25     Years:  34.00     Pack years: 8.50     Types: Cigarettes     Smokeless tobacco: Never Used     Tobacco comment: started at age 11.  Quit during pregnancyX4.  Quit 3 months ago.    Substance Use Topics     Alcohol use: No     Alcohol/week: 0.0 oz     Comment: quit 1995        History   Drug Use No     Comment: past hx 22 years ago/ uppers downers, canibus       Current Outpatient Medications   Medication Sig Dispense Refill     acetaminophen (TYLENOL) 500 MG tablet Take 500-1,000 mg by mouth every 6 hours as needed for mild pain       albuterol (2.5 MG/3ML) 0.083% neb solution Take 1 vial (2.5 mg) by nebulization every 6 hours as needed for shortness of breath / dyspnea or wheezing 1 Box 0     albuterol (PROAIR HFA/PROVENTIL HFA/VENTOLIN HFA) 108 (90 BASE) MCG/ACT Inhaler Inhale 2 puffs into the lungs every 6 hours as needed for shortness of breath / dyspnea or wheezing 3 Inhaler 1     allopurinol (ZYLOPRIM) 300 MG tablet Take 1 tablet (300 mg) by mouth daily 90 tablet 0     ARIPiprazole (ABILIFY) 10 MG tablet Take 1 tablet (10 mg) by mouth daily 90 tablet 1     buPROPion (WELLBUTRIN XL) 150 MG 24 hr tablet TAKE  ONE TABLET BY MOUTH EVERY DAY IN THE MORNING 30 tablet 0     EPINEPHrine (EPIPEN/ADRENACLICK/OR ANY BX GENERIC EQUIV) 0.3 MG/0.3ML injection 2-pack Inject 0.3 mLs (0.3 mg) into the muscle once as needed for anaphylaxis 0.6 mL 0     furosemide (LASIX) 20 MG tablet TAKE TWO TABLETS BY MOUTH TWICE DAILY 60 tablet 1     gabapentin (NEURONTIN) 300 MG capsule ONE CAPSULE BY MOUTH AT BEDTIME WITH 600 MG CAPSULE- FOR TOTAL  MG AT BEDTIME 90 capsule 2     gabapentin (NEURONTIN) 600 MG tablet TAKE ONE TABLET BY MOUTH THREE TIMES DAILY 90 tablet 0     ipratropium - albuterol 0.5 mg/2.5 mg/3 mL (DUONEB) 0.5-2.5 (3) MG/3ML neb solution Take 1 vial (3 mLs) by nebulization every 6 hours as needed for shortness of breath / dyspnea or wheezing 30 vial 1     lamoTRIgine (LAMICTAL) 200 MG tablet TAKE ONE TABLET BY MOUTH ONE TIME  "DAILY 30 tablet 0     levothyroxine (SYNTHROID/LEVOTHROID) 150 MCG tablet Take 1 tablet (150 mcg) by mouth daily 90 tablet 2     lidocaine (XYLOCAINE) 5 % ointment Apply topically 3 times daily as needed for moderate pain 240 g 1     mometasone-formoterol (DULERA) 200-5 MCG/ACT oral inhaler Inhale 2 puffs into the lungs 2 times daily 13 g 1     morphine (MSIR) 15 MG IR tablet Take 1 tablet (15 mg) by mouth every 6 hours as needed for severe pain 8 tablet 0     omeprazole (PRILOSEC) 20 MG CR capsule TAKE ONE CAPSULE BY MOUTH TWICE DAILY 180 capsule 3     ondansetron (ZOFRAN ODT) 4 MG ODT tab Take 1-2 tablets (4-8 mg) by mouth every 8 hours as needed for nausea or vomiting 8 tablet 0     order for DME Equipment being ordered:x2 [employer] 30/40mmHg compression wraps with x2 extra prs of compression liners 1 each 0     order for DME Equipment being ordered: Nebulizer 1 Device 0     order for DME Equipment being ordered: CPAP  AIRSENSE 10  5-18 CM H20  SN# [id number]   DN# [id number]       order for DME Equipment being ordered: [id number]\" x2pair     \"20-30mmHg [employer] Hybrid Liners\" x 2 pair 2 Units 11     order for DME Equipment being ordered: DEPENDS SIZE LARGE 60 Month 5     order for DME Equipment being ordered: INCONTINENCE PADS   QID 1 Month 11     potassium chloride SA (K-DUR/KLOR-CON M) 10 MEQ CR tablet TAKE ONE TABLET BY MOUTH ONE TIME DAILY 90 tablet 3     pramipexole (MIRAPEX) 0.125 MG tablet TAKE 1-2 TABLETS BY MOUTH AT BEDTIME 60 tablet 10     simvastatin (ZOCOR) 20 MG tablet TAKE ONE TABLET BY MOUTH AT BEDTIME 90 tablet 1     traZODone (DESYREL) 150 MG tablet TAKE ONE TABLET BY MOUTH AT BEDTIME 30 tablet 0     VITAMIN D3 1000 units tablet TAKE ONE TABLET BY MOUTH ONE TIME DAILY 30 tablet 2     warfarin (COUMADIN) 5 MG tablet Take 10 mg on Mon, Wed, Fri; 12.5 mg all other days or As directed by Anticoagulation clinic 205 tablet 0       Allergies   Allergen Reactions     Darvocet [Propoxyphene N-Apap] Anaphylaxis "     Darvocet,Percocet      Percocet [Oxycodone-Acetaminophen] Anaphylaxis, Hives and Swelling     Has tolerated hydromorphone in the past.      Asa [Aspirin] Hives     Aspirin causes seizures and hives, throat swelling.   Has tolerated ketorolac in the past.        Bee      Ibuprofen Swelling     Throat swelling per patient      Lyrica [Pregabalin] Other (See Comments) and Swelling     dizziness     Povidone Iodine Rash     Reaction to topical betadine     Tape [Adhesive Tape] Rash      CBC  Recent Labs   Lab Test 12/18/18  0109   WBC 6.3   RBC 6.69*   HGB 19.2*   HCT 60.8*   MCV 91   MCH 28.7   MCHC 31.6   RDW 21.4*   *       BMP  Recent Labs   Lab Test 12/18/18  0109      POTASSIUM 3.6   HOOD 8.1*   CHLORIDE 105   CO2 28   BUN 12   CR 0.74   *       LFTs  Recent Labs   Lab Test 12/18/18  0109   PROTTOTAL 6.4*   ALBUMIN 3.3*   BILITOTAL 0.5   ALKPHOS 135   AST 22   ALT 27     Results for orders placed or performed during the hospital encounter of 12/18/18   Abdomen US, limited (RUQ only)    Narrative    US ABDOMEN LIMITED  12/18/2018 1:56 AM    CLINICAL INFORMATION: Pain, RUQ pain.    COMPARISON: CT abdomen 8/25/2018.    FINDINGS: Limited right upper quadrant ultrasound demonstrates  multiple tiny gallstones. No gallbladder wall thickening or  sonographic Escalona's sign. No bile duct dilatation. Increased liver  echogenicity suggesting fatty infiltration. Visualized portions of the   pancreas are negative. The visualized portion of the right kidney is  unremarkable. No hydronephrosis on the right.      Impression    IMPRESSION:  1. Cholelithiasis without sonographic evidence of cholecystitis.  2. Fatty infiltration of the liver.    JEAN WATERS MD   CT Abdomen Pelvis w Contrast    Narrative    CT ABDOMEN PELVIS W CONTRAST  12/18/2018 3:02 AM     HISTORY: Nausea, vomiting; right flank pain, RUQ abdominal pain.    TECHNIQUE: Volumetric acquisition through abdomen and pelvis with IV  contrast.  100 mL Isovue-370. Radiation dose for this scan was reduced  using automated exposure control, adjustment of the mA and/or kV  according to patient size, or iterative reconstruction technique.    COMPARISON: CT 8/25/2018. Ultrasound 12/18/2018.    FINDINGS: Liver is slightly enlarged measuring 21 cm craniocaudad with  diffuse fatty infiltration. Enlarged spleen measuring 16.7 cm  craniocaudad. Tiny gallstones better seen previously and on the recent  ultrasound. Pancreas, adrenal glands and kidneys demonstrate no  worrisome findings. Low attenuation subcentimeter renal lesion(s).  These are compatible with small benign cysts and no specific imaging  evaluation or follow-up is recommended. Tiny nonobstructing stone in  the lower pole of each kidney.    Mild atelectasis at the right lung base.    Uterus is absent. 2.4 cm low-density left ovarian lesion of doubtful  clinical significance. If desired, this could be further evaluated  with follow-up outpatient pelvic ultrasound. No bowel obstruction or  ascites. Left iliac vein stent.      Impression    IMPRESSION:  1. No acute abnormality.  2. Cholelithiasis.  3. Mild hepatosplenomegaly and fatty infiltration of the liver.  4. Small left ovarian lesion of doubtful clinical significance.    JEAN WATERS MD     *Note: Due to a large number of results and/or encounters for the requested time period, some results have not been displayed. A complete set of results can be found in Results Review.     ROS  Constitutional - Denies fevers, weight loss, malaise, lethargy  Neuro - Denies tremors or seizures  Pulmon - Denies SOB, dyspnea, hemoptysis, chronic cough or use of an inhaler  CV - Denies CP, SOB, lower extremity edema, difficulty w/ stairs, has never used NTG  GI - Denies hematemesis, BRBPR, melena, chronic diarrhea or epigastric pain   - Denies hematuria, difficulty voiding, h/o STDs  Hematology - Denies blood clotting disorders, chronic anemias  Dermatology - No  "melanomas or skin cancers  Rheumatology - No h/o RA  Pysch - Denies depression, bipolar d/o or schizophrenia    Exam:/77 (BP Location: Right arm, Patient Position: Chair, Cuff Size: Adult Regular)   Pulse 104   Temp 99.6  F (37.6  C) (Tympanic)   Ht 1.473 m (4' 10\")   Wt 104.8 kg (231 lb)   LMP 09/27/2009   BMI 48.28 kg/m       General - Alert and Oriented X4, NAD, well nourished  HEENT - Normocephalic, atraumatic, PERRLA, Nose midline, Throat without lesions  Neck - supple, no LAD, Thyroid non-palpable, Carotids without bruits  Lungs - Clear to auscultation bilaterally with good inspiratory effort, no tactile fremitus  CV - Heart RRR, no lift's, thrills, murmurs, rubs, or gallops. Carotid, radial, and femoral pulses 2+ bilaterally  Abdomen - Soft, non-tender, +BS, no hepatosplenomegaly, no palpable masses  Neuro - Full ROM, Strength 5/5 and major muscle groups, sensation intact  Extremities - No cyanosis, clubbing or edema    A/P: 53-year-old female with symptomatic cholelithiasis.  Patient is a good candidate for laparoscopic cholecystectomy.  Risks benefits alternatives and complications were discussed with the patient including the possibility of infection bleeding or bile leak.  Patient understood and wished to proceed.  Patient will need to be off her Coumadin for at least 1 week prior to surgery.  I have asked the patient to get a preop from her primary care provider.    Aaron Siegel MD     Again, thank you for allowing me to participate in the care of your patient.        Sincerely,        Aaron Siegel MD    "

## 2018-12-19 NOTE — NURSING NOTE
"Chief Complaint   Patient presents with     Consult     Biliary Colic        Initial /77 (BP Location: Right arm, Patient Position: Chair, Cuff Size: Adult Regular)   Pulse 104   Temp 99.6  F (37.6  C) (Tympanic)   Ht 1.473 m (4' 10\")   Wt 104.8 kg (231 lb)   LMP 09/27/2009   BMI 48.28 kg/m   Estimated body mass index is 48.28 kg/m  as calculated from the following:    Height as of this encounter: 1.473 m (4' 10\").    Weight as of this encounter: 104.8 kg (231 lb).  BP completed using cuff size: regular long   Medications and allergies reviewed.      Jennifer JAVED CMA     "

## 2018-12-21 ENCOUNTER — ANTICOAGULATION THERAPY VISIT (OUTPATIENT)
Dept: ANTICOAGULATION | Facility: CLINIC | Age: 53
End: 2018-12-21
Payer: COMMERCIAL

## 2018-12-21 ENCOUNTER — TELEPHONE (OUTPATIENT)
Dept: ANTICOAGULATION | Facility: CLINIC | Age: 53
End: 2018-12-21

## 2018-12-21 DIAGNOSIS — I73.9 PVD (PERIPHERAL VASCULAR DISEASE) WITH CLAUDICATION (H): ICD-10-CM

## 2018-12-21 DIAGNOSIS — Z79.01 LONG TERM CURRENT USE OF ANTICOAGULANT THERAPY: ICD-10-CM

## 2018-12-21 DIAGNOSIS — I82.409 DVT (DEEP VENOUS THROMBOSIS) (H): ICD-10-CM

## 2018-12-21 PROCEDURE — 99207 ZZC NO CHARGE NURSE ONLY: CPT

## 2018-12-21 NOTE — PROGRESS NOTES
ANTICOAGULATION FOLLOW-UP CLINIC VISIT    Patient Name:  Bia Bill  Date:  12/21/2018  Contact Type:  Telephone    SUBJECTIVE:     Patient Findings     Positives:   Change in medications (Morphine), Other complaints (Pain), Inflammation (Gall stones/kidney stones)    Comments:   Patient was in the ED on 12/18.  Dx with Cholecystitis. Lap [place]en planned for 1/4.   Write decreased patient's weekly dose to 70 mg for a 13% decrease.  Patient will begin to hold her Coumadin on 12/30. Telephone encounter sent for bridging to PCP.  Patient denies signs or symptoms of bleeding. Writer educated patient regarding increased bleed risk and when to seek immediate medical attention. Patient verbalized understanding.              OBJECTIVE    INR   Date Value Ref Range Status   12/18/2018 3.58 (H) 0.86 - 1.14 Final       ASSESSMENT / PLAN  INR assessment SUPRA      Anticoagulation Summary  As of 12/21/2018    INR goal:   2.0-2.5   TTR:   50.3 % (3.7 y)   INR used for dosing:   3.58! (12/18/2018)   Warfarin maintenance plan:   10 mg (5 mg x 2) every Mon, Wed, Fri; 12.5 mg (5 mg x 2.5) all other days   Full warfarin instructions:   12/22: 10 mg; 12/23: 10 mg; 12/25: 10 mg; 12/27: 10 mg; 12/29: 10 mg; 12/30: Hold; 12/31: Hold; 1/1: Hold; 1/2: Hold; 1/3: Hold; 1/5: 10 mg; Otherwise 10 mg every Mon, Wed, Fri; 12.5 mg all other days   Weekly warfarin total:   80 mg   Plan last modified:   Angelina Nj RN (7/12/2018)   Next INR check:   1/8/2019   Priority:   INR   Target end date:   Indefinite    Indications    Long term current use of anticoagulant therapy [Z79.01]  DVT (deep venous thrombosis) (H) [I82.409]  PVD (peripheral vascular disease) with claudication (H) [I73.9]             Anticoagulation Episode Summary     INR check location:       Preferred lab:       Send INR reminders to:   JEROMY SIMPSON Conject POOL    Comments:   * lab draw due to elevated hematocrit (fingerstick meters don't work). LEFT LE. STENT x 3 LEFT  UPPER LEG. Previous arterial clot. New goal range 2-2.5 starting 11/6/17.      Anticoagulation Care Providers     Provider Role Specialty Phone number    Kd Leong MD [facility] [phone]            See the Encounter Report to view Anticoagulation Flowsheet and Dosing Calendar (Go to Encounters tab in chart review, and find the Anticoagulation Therapy Visit)        Reina Forrest RN

## 2018-12-21 NOTE — TELEPHONE ENCOUNTER
Norah is scheduled to have Laparoscopic cholecystectomy completed on January 4th and will need to hold warfarin for 5 days.   Patient is currently on warfarin for DVT/PVD. CHADSVASC = 5  Perioperative Thrombotic Risk Stratification   High risk for clot    (Bridge) Medium risk for clot   (Maybe Bridge) Low risk for clot   (No Bridge)     Artificial Mitral valve    Artificial aortic valve if tilting disc or caged ball    Stroke, TIA within last 6 months    VTE within last 3 months    Severe thrombophilia (protein C or S deficiency, antithrombin, homozygous Factor V Leiden, antiphospholipid antibodies)  AFIB and    [id number] score 7-9    Stroke/TIA within last 3 months    Rheumatic valvular heart disease   Bileaflet aortic valve  Plus  AFib, prior stroke or TIA, HTN, DM, CHF, or over 75 years old    [id number] score 5-6    VTE within past 3 to 12 months    Non-severe thrombophilia (heterozygous factor V Leiden)    Recurrent VTE    Active cancer (or treated within last 6 months)     Bileaflet valve without Afib, no other risk factors    [id number] < 4 (with no history stroke or TIA)    VTE more than 12 months ago   For additional info on Anticoagulation Bridging Guidelines -- click HERE  While the patient is off warfarin, would you recommend the patient use enoxaparin injections as a bridge? If yes, would you like the prophylactic dose (40mg daily) or the therapeutic dose (1mg/kg twice daily)? Should the patient use enoxaparin both before and after the procedure or only afterwards?  Wt Readings from Last 2 Encounters:   12/19/18 104.8 kg (231 lb)   12/18/18 101.2 kg (223 lb)     Estimated Creatinine Clearance: 147.1 mL/min (based on SCr of 0.74 mg/dL).   Therapeutic Enoxaparin if high risk for clot   CrCl >30ml/min, 1mg/kg/twice daily OR 1.5mg/kg/daily   CrCl >15ml/min, 1mg/kg/daily   CrCl <15mg/min or on dialysis use heparin  Prophylactic if need protection after procedure (i.e. high clot risk procedure)    Anticoagulation Clinic Staff  Phone: [phone]   Fax: [phone]    [place] # [id number]

## 2018-12-26 ENCOUNTER — ANTICOAGULATION THERAPY VISIT (OUTPATIENT)
Dept: ANTICOAGULATION | Facility: CLINIC | Age: 53
End: 2018-12-26

## 2018-12-26 DIAGNOSIS — Z79.01 LONG TERM CURRENT USE OF ANTICOAGULANT THERAPY: ICD-10-CM

## 2018-12-26 DIAGNOSIS — I82.409 DVT (DEEP VENOUS THROMBOSIS) (H): ICD-10-CM

## 2018-12-26 DIAGNOSIS — I73.9 PVD (PERIPHERAL VASCULAR DISEASE) WITH CLAUDICATION (H): ICD-10-CM

## 2018-12-26 NOTE — PROGRESS NOTES
Last warfarin dose: Saturday, December 29th Sunday, December 30th, NO warfarin  Monday, December 31st, NO warfarin  Tuesday, January 1st, NO warfarin, begin enoxaparin injections into the abdomen every 12 hours (AM and PM)  Wednesday, January 2nd, NO warfarin, enoxaparin injection into the abdomen every 12 hours (AM and PM)  Thursday, January 3rd, NO warfarin, enoxaparin injection into the abdomen AM only (no enoxaparin 24 hours prior to surgery)  Friday, January 4th, DAY OF PROCEDURE, NO enoxaparin. Restart warfarin 15mg (3 tabs) in the evening, unless instructed otherwise by the physician.  Saturday, January 5th, Restart enoxaparin injections into the abdomen every 12 hours (AM and PM), unless instructed otherwise by the physician, and 15mg (3 tabs) warfarin in the evening.   Sunday, January 6th, Enoxaparin injection into the abdomen every 12 hours (AM and PM) and 12.5mg (2.5 tabs) warfarin in the evening.  Monday, January 7th, Enoxaparin injection into the abdomen every 12 hours (AM and PM) and 10mg (2 tabs) warfarin in the evening.  Tuesday, January 8th, Enoxaparin injection into the abdomen in the AM. Recheck INR at the [place] lab. The Anticoagulation Clinic will call with further dosing instructions.     Wt Readings from Last 2 Encounters:   12/19/18 104.8 kg (231 lb)   12/18/18 101.2 kg (223 lb)     Estimated Creatinine Clearance: 147.1 mL/min (based on SCr of 0.74 mg/dL).

## 2018-12-26 NOTE — PATIENT INSTRUCTIONS
Last warfarin dose: Saturday, December 29th Sunday, December 30th, NO warfarin    Monday, December 31st, NO warfarin    Tuesday, January 1st, NO warfarin, begin enoxaparin injections into the abdomen every 12 hours (AM and PM)    Wednesday, January 2nd, NO warfarin, enoxaparin injection into the abdomen every 12 hours (AM and PM)    Thursday, January 3rd, NO warfarin, enoxaparin injection into the abdomen AM only (no enoxaparin 24 hours prior to surgery)    Friday, January 4th, DAY OF PROCEDURE, NO enoxaparin. Restart warfarin 15mg (3 tabs) in the evening, unless instructed otherwise by the physician.    Saturday, January 5th, Restart enoxaparin injections into the abdomen every 12 hours (AM and PM), unless instructed otherwise by the physician, and 15mg (3 tabs) warfarin in the evening.     Sunday, January 6th, Enoxaparin injection into the abdomen every 12 hours (AM and PM) and 12.5mg (2.5 tabs) warfarin in the evening.    Monday, January 7th, Enoxaparin injection into the abdomen every 12 hours (AM and PM) and 10mg (2 tabs) warfarin in the evening.    Tuesday, January 8th, Enoxaparin injection into the abdomen in the AM. Recheck INR at the [place] lab. The Anticoagulation Clinic will call with further dosing instructions.

## 2018-12-27 NOTE — PROGRESS NOTES
Writer reviewed below bridging instructions and spoke with the patient on the phone. The instructions were reviewed with the patient and will also be mailed as a hard copy. The pt does not use mychart.     Lovenox will be sent to [facility] in [place]. The patient has used this before and does not require teaching. She already has a lab appointment for 1-8-19 to check INR.    Mireille Torres RN, BSN, PHN  12-27-18

## 2019-01-03 ENCOUNTER — OFFICE VISIT (OUTPATIENT)
Dept: FAMILY MEDICINE | Facility: CLINIC | Age: 54
End: 2019-01-03
Payer: COMMERCIAL

## 2019-01-03 ENCOUNTER — ANESTHESIA EVENT (OUTPATIENT)
Dept: SURGERY | Facility: CLINIC | Age: 54
End: 2019-01-03
Payer: COMMERCIAL

## 2019-01-03 VITALS
HEIGHT: 58 IN | BODY MASS INDEX: 48.91 KG/M2 | WEIGHT: 233 LBS | DIASTOLIC BLOOD PRESSURE: 64 MMHG | SYSTOLIC BLOOD PRESSURE: 130 MMHG | HEART RATE: 68 BPM | TEMPERATURE: 98.3 F

## 2019-01-03 DIAGNOSIS — I82.4Y9 DEEP VEIN THROMBOSIS (DVT) OF PROXIMAL LOWER EXTREMITY, UNSPECIFIED CHRONICITY, UNSPECIFIED LATERALITY (H): ICD-10-CM

## 2019-01-03 DIAGNOSIS — J44.9 CHRONIC OBSTRUCTIVE PULMONARY DISEASE, UNSPECIFIED COPD TYPE (H): ICD-10-CM

## 2019-01-03 DIAGNOSIS — Z01.818 PREOP GENERAL PHYSICAL EXAM: Primary | ICD-10-CM

## 2019-01-03 DIAGNOSIS — E66.2 MORBID OBESITY WITH ALVEOLAR HYPOVENTILATION (H): ICD-10-CM

## 2019-01-03 DIAGNOSIS — K80.20 SYMPTOMATIC CHOLELITHIASIS: ICD-10-CM

## 2019-01-03 LAB
BASOPHILS # BLD AUTO: 0 10E9/L (ref 0–0.2)
BASOPHILS NFR BLD AUTO: 0.6 %
DIFFERENTIAL METHOD BLD: ABNORMAL
EOSINOPHIL # BLD AUTO: 0.1 10E9/L (ref 0–0.7)
EOSINOPHIL NFR BLD AUTO: 1.1 %
ERYTHROCYTE [DISTWIDTH] IN BLOOD BY AUTOMATED COUNT: 22.2 % (ref 10–15)
HCT VFR BLD AUTO: 62.2 % (ref 35–47)
HGB BLD-MCNC: 19.3 G/DL (ref 11.7–15.7)
IMM GRANULOCYTES # BLD: 0 10E9/L (ref 0–0.4)
IMM GRANULOCYTES NFR BLD: 0.2 %
LYMPHOCYTES # BLD AUTO: 1.6 10E9/L (ref 0.8–5.3)
LYMPHOCYTES NFR BLD AUTO: 29.4 %
MCH RBC QN AUTO: 29 PG (ref 26.5–33)
MCHC RBC AUTO-ENTMCNC: 31 G/DL (ref 31.5–36.5)
MCV RBC AUTO: 93 FL (ref 78–100)
MONOCYTES # BLD AUTO: 0.4 10E9/L (ref 0–1.3)
MONOCYTES NFR BLD AUTO: 7.3 %
NEUTROPHILS # BLD AUTO: 3.4 10E9/L (ref 1.6–8.3)
NEUTROPHILS NFR BLD AUTO: 61.4 %
NRBC # BLD AUTO: 0 10*3/UL
NRBC BLD AUTO-RTO: 0 /100
PLATELET # BLD AUTO: 116 10E9/L (ref 150–450)
RBC # BLD AUTO: 6.66 10E12/L (ref 3.8–5.2)
WBC # BLD AUTO: 5.5 10E9/L (ref 4–11)

## 2019-01-03 PROCEDURE — 36415 COLL VENOUS BLD VENIPUNCTURE: CPT | Performed by: PHYSICIAN ASSISTANT

## 2019-01-03 PROCEDURE — 85025 COMPLETE CBC W/AUTO DIFF WBC: CPT | Performed by: PHYSICIAN ASSISTANT

## 2019-01-03 PROCEDURE — 99215 OFFICE O/P EST HI 40 MIN: CPT | Performed by: PHYSICIAN ASSISTANT

## 2019-01-03 ASSESSMENT — MIFFLIN-ST. JEOR: SCORE: 1556.63

## 2019-01-03 ASSESSMENT — COPD QUESTIONNAIRES: COPD: 1

## 2019-01-03 ASSESSMENT — PAIN SCALES - GENERAL: PAINLEVEL: EXTREME PAIN (9)

## 2019-01-03 ASSESSMENT — LIFESTYLE VARIABLES: TOBACCO_USE: 1

## 2019-01-03 NOTE — PATIENT INSTRUCTIONS
Last warfarin dose: Saturday, December 29th Sunday, December 30th, NO warfarin  Monday, December 31st, NO warfarin  Tuesday, January 1st, NO warfarin, begin enoxaparin injections into the abdomen every 12 hours (AM and PM)  Wednesday, January 2nd, NO warfarin, enoxaparin injection into the abdomen every 12 hours (AM and PM)  Thursday, January 3rd, NO warfarin, enoxaparin injection into the abdomen AM only (no enoxaparin 24 hours prior to surgery)  Friday, January 4th, DAY OF PROCEDURE, NO enoxaparin. Restart warfarin 15mg (3 tabs) in the evening, unless instructed otherwise by the physician.  Saturday, January 5th, Restart enoxaparin injections into the abdomen every 12 hours (AM and PM), unless instructed otherwise by the physician, and 15mg (3 tabs) warfarin in the evening.   Sunday, January 6th, Enoxaparin injection into the abdomen every 12 hours (AM and PM) and 12.5mg (2.5 tabs) warfarin in the evening.  Monday, January 7th, Enoxaparin injection into the abdomen every 12 hours (AM and PM) and 10mg (2 tabs) warfarin in the evening.  Tuesday, January 8th, Enoxaparin injection into the abdomen in the AM. Recheck INR at the [place] lab. The Anticoagulation Clinic will call with further dosing instructions.     Before Your Surgery      Call your surgeon if there is any change in your health. This includes signs of a cold or flu (such as a sore throat, runny nose, cough, rash or fever).    Do not smoke, drink alcohol or take over the counter medicine (unless your surgeon or primary care doctor tells you to) for the 24 hours before and after surgery.    If you take prescribed drugs: Follow your doctor s orders about which medicines to take and which to stop until after surgery.    Eating and drinking prior to surgery: follow the instructions from your surgeon    Take a shower or bath the night before surgery. Use the soap your surgeon gave you to gently clean your skin. If you do not have soap from your  surgeon, use your regular soap. Do not shave or scrub the surgery site.  Wear clean pajamas and have clean sheets on your bed.

## 2019-01-03 NOTE — ANESTHESIA PREPROCEDURE EVALUATION
Anesthesia Pre-Procedure Evaluation    Patient: Bia Bill   MRN: [id number] : 1965          Preoperative Diagnosis: gallstones    Procedure(s):  Laparoscopic cholecystectomy    Past Medical History:   Diagnosis Date     Acromioclavicular joint arthritis 2017     Acute bronchospasm 8/15/2016     Acute gouty arthritis 2014     Anxiety state, unspecified      Bipolar I disorder, most recent episode (or current) unspecified      Closed fracture of metatarsal bone 2007     Depressive disorder, not elsewhere classified      DVT, lower extremity (H)      Headache 10/17/2014     Impingement syndrome of shoulder region 2017     Polycythemia, secondary     Cardinal HGB      Right shoulder pain 10/14/2014     Trochanteric bursitis of both hips 2016     Past Surgical History:   Procedure Laterality Date     BILIARY STENT SINGLE USE COV      3 stints in left leg     BONE MARROW BIOPSY, BONE SPECIMEN, NEEDLE/TROCAR N/A 2014    Procedure: BIOPSY BONE MARROW;  Surgeon: Hay Aaron MD;  Location: [facility]     D & C  10/26/09    with uterine ablation     ESOPHAGOSCOPY, GASTROSCOPY, DUODENOSCOPY (EGD), COMBINED  2014    Procedure: Gastroscopy;  Surgeon: Moris Thomas MD;  Location: [facility]     HYSTERECTOMY, PAP NO LONGER INDICATED  2010     LITHOTRIPSY  2004    Lithotrypsy       Anesthesia Evaluation     . Pt has had prior anesthetic. Type: General and MAC           ROS/MED HX    ENT/Pulmonary: Comment: Bronchospasm  Morbid obesity with alveolar hypoventilation  SOB with exertion    (+)sleep apnea, tobacco use, Current use .5 packs/day  Intermittent asthma COPD, uses CPAP ? cmH2O , . .    Neurologic: Comment: Hearing loss  Headaches  RLS    (+)neuropathy CVA date:      Cardiovascular: Comment: Claudication  Had chest pain earlier this year - evaluated by cardiology with stress test and Zio patch - negative/normal. Not felt to be cardiac in nature. No new or  different symptoms per patient (per H and P)    (+) hypertension-Peripheral Vascular Disease---. Taking blood thinners : . . . :. . Previous cardiac testing Echodate:3-20-18results:Interpretation Summary     The left ventricle is normal in size.  There is mild concentric left ventricular hypertrophy.  Left ventricular systolic function is normal.  The visual ejection fraction is estimated at 60-65%.  The right ventricle is normal in structure, function and size.Stress Testdate:5-9-18 results:Indication/Clinical History: Chest pain with palpitations     Impression  1. Myocardial perfusion imaging using single isotope technique  demonstrated no significant perfusion defect consistent with  significant ischemia or infarct.There is mild decrease in  anterior/anterior apical activity, rest greater than stress, most  likely consistent with soft tissue/breast attenuation artifact..   2. Gated images demonstrated normal size left ventricle with vigorous  overall contractility and no significant regional wall motion  abnormality. The left ventricular systolic function is hyperdynamic  with ejection fraction 77%.  3. Compared to the prior study from not applicable .     Procedure  Pharmacologic stress testing was performed with Lexiscan at a rate of  0.08 mg/ml rapid bolus injection, for 15 seconds, 0.4 mg/5ml  intravenously. Low-level exercise was not performed along with the  vasodilator infusion. The heart rate was 82 at baseline and enoc to  95 beats per minute during the Lexiscan infusion. The rest blood  pressure was 134/70 mmHg and was 160/66 mm Hg during Lexiscan  infusion. The patient experienced no symptoms  during the test.     Myocardial perfusion imaging was performed at rest, approximately 45  minutes after the injection intravenously of 10.4 mCi of Tc-99m  Myoview. At peak pharmacologic effect, 10-20 seconds after Lexiscan,   the patient was injected intravenously with 33.0 mCi of  Tc-99m  Myoview. The  post-stress tomographic imaging was performed  approximately 60 minutes after stress.     EKG Findings  The resting EKG demonstrated normal sinus rhythm with right bundle  branch block and diffuse nonspecific ST-T wave changes. The stress EKG  demonstrated normal sinus rhythm with right bundle branch block and no  evidence of significant ST depression.     Tomographic Findings  Overall, the study quality is adequate . On the stress images, there  is no significant perfusion defect with mild decrease in anterior  activity from base to apex. On the rest images, there is no  significant perfusion defect with mild decrease in anterior activity  from the base the apex . Gated images demonstrated normal size left  ventricle with vigorous overall contractility and no significant  regional wall motion abnormality. The left ventricular ejection  fraction was calculated to be 77% with stress and at rest. TID was  absent.ECG reviewed date:12-18-18 results:Sinus  Rhythm   -Right bundle branch block and right axis -possible right ventricular hypertrophy  -consider pulmonary disease.     ABNORMAL date: results:         VERA: .5.   METS/Exercise Tolerance:  1 - Eating, dressing   Hematologic: Comments: drythrocytosis  Secondary polycythemia  Hgb = 19.2    (+) History of blood clots pt is anticoagulated, -      Musculoskeletal: Comment: Left leg pain  Chondromalacia of patella  Cervical DDD  AC joint arthritis  Shoulder impingement  Right shoulder pain  Bilateral hip trochanteric bursitis        GI/Hepatic: Comment: Fatty liver  Colon polyps  GI bleed  hepatospleenomegally    (+) GERD Asymptomatic on medication, liver disease,       Renal/Genitourinary: Comment: Renal impairment    (+) Nephrolithiasis ,       Endo: Comment: Morbid obesity with alveolar hypoventilation    (+) thyroid problem hypothyroidism, Obesity, .      Psychiatric: Comment: Developmental reading disorder  Somatization disorder    (+) bipolar, anxiety and  "depression     Psychiatric history: personality disorder.   Infectious Disease:         Malignancy:         Other: Comment: History of EtOH abuse  Vit D deficiency  Left ovarian cyst  Gout  Rosacea  Right axillary sebaceous cyst                         Physical Exam  Normal systems: cardiovascular and pulmonary    Airway   Mallampati: II    Dental   (+) upper dentures and lower dentures    Cardiovascular       Pulmonary             Lab Results   Component Value Date    WBC 6.3 12/18/2018    HGB 19.2 (H) 12/18/2018    HCT 60.8 (H) 12/18/2018     (L) 12/18/2018    CRP 12.3 (H) 11/30/2015    SED 3 11/30/2015     12/18/2018    POTASSIUM 3.6 12/18/2018    CHLORIDE 105 12/18/2018    CO2 28 12/18/2018    BUN 12 12/18/2018    CR 0.74 12/18/2018     (H) 12/18/2018    HOOD 8.1 (L) 12/18/2018    MAG 1.9 10/24/2007    ALBUMIN 3.3 (L) 12/18/2018    PROTTOTAL 6.4 (L) 12/18/2018    ALT 27 12/18/2018    AST 22 12/18/2018    ALKPHOS 135 12/18/2018    BILITOTAL 0.5 12/18/2018    LIPASE 135 12/18/2018    AMYLASE 43 12/21/2006    PTT 39 (H) 10/14/2014    INR 3.58 (H) 12/18/2018    TSH 1.07 04/06/2018    T4 <0.10 (L) 04/29/2008    T3 215 (H) 09/19/2007    HCG Negative 12/22/2006       Preop Vitals  BP Readings from Last 3 Encounters:   12/19/18 135/77   12/18/18 157/84   10/03/18 126/76    Pulse Readings from Last 3 Encounters:   12/19/18 104   12/18/18 77   10/03/18 78      Resp Readings from Last 3 Encounters:   12/18/18 18   10/03/18 18   09/28/18 18    SpO2 Readings from Last 3 Encounters:   12/18/18 93%   10/03/18 97%   09/28/18 95%      Temp Readings from Last 1 Encounters:   12/19/18 37.6  C (99.6  F) (Tympanic)    Ht Readings from Last 1 Encounters:   12/19/18 1.473 m (4' 10\")      Wt Readings from Last 1 Encounters:   12/19/18 104.8 kg (231 lb)    Estimated body mass index is 48.28 kg/m  as calculated from the following:    Height as of 12/19/18: 1.473 m (4' 10\").    Weight as of 12/19/18: 104.8 kg (231 lb). "       Anesthesia Plan      History & Physical Review  History and physical reviewed and following examination; no interval change.    ASA Status:  3 .    NPO Status:  > 6 hours    Plan for General with Intravenous and Propofol induction. Maintenance will be Inhalation and Balanced.    PONV prophylaxis:  Ondansetron (or other 5HT-3) and Dexamethasone or Solumedrol  Additional equipment: Videolaryngoscope      Postoperative Care  Postoperative pain management:  IV analgesics.      Consents  Anesthetic plan, risks, benefits and alternatives discussed with:  Patient..                 Willie Whitten CRNA, APRN CRNA
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
## 2019-01-04 ENCOUNTER — HOSPITAL ENCOUNTER (OUTPATIENT)
Facility: CLINIC | Age: 54
Discharge: HOME | End: 2019-01-04
Attending: SURGERY | Admitting: SURGERY
Payer: COMMERCIAL

## 2019-01-04 ENCOUNTER — ANESTHESIA (OUTPATIENT)
Dept: SURGERY | Facility: CLINIC | Age: 54
End: 2019-01-04
Payer: COMMERCIAL

## 2019-01-04 VITALS
SYSTOLIC BLOOD PRESSURE: 120 MMHG | TEMPERATURE: 98.1 F | DIASTOLIC BLOOD PRESSURE: 64 MMHG | OXYGEN SATURATION: 92 % | HEART RATE: 84 BPM | RESPIRATION RATE: 20 BRPM

## 2019-01-04 DIAGNOSIS — G89.18 POST-OP PAIN: Primary | ICD-10-CM

## 2019-01-04 LAB — INR PPP: 0.94 (ref 0.86–1.14)

## 2019-01-04 PROCEDURE — 36000058 ZZH SURGERY LEVEL 3 EA 15 ADDTL MIN: Performed by: SURGERY

## 2019-01-04 PROCEDURE — 36415 COLL VENOUS BLD VENIPUNCTURE: CPT | Performed by: NURSE ANESTHETIST, CERTIFIED REGISTERED

## 2019-01-04 PROCEDURE — 25000125 ZZHC RX 250: Performed by: SURGERY

## 2019-01-04 PROCEDURE — 25000132 ZZH RX MED GY IP 250 OP 250 PS 637: Performed by: SURGERY

## 2019-01-04 PROCEDURE — 37000009 ZZH ANESTHESIA TECHNICAL FEE, EACH ADDTL 15 MIN: Performed by: SURGERY

## 2019-01-04 PROCEDURE — 40000306 ZZH STATISTIC PRE PROC ASSESS II: Performed by: SURGERY

## 2019-01-04 PROCEDURE — 88304 TISSUE EXAM BY PATHOLOGIST: CPT | Mod: 26 | Performed by: SURGERY

## 2019-01-04 PROCEDURE — 71000014 ZZH RECOVERY PHASE 1 LEVEL 2 FIRST HR: Performed by: SURGERY

## 2019-01-04 PROCEDURE — 25000128 H RX IP 250 OP 636: Performed by: NURSE ANESTHETIST, CERTIFIED REGISTERED

## 2019-01-04 PROCEDURE — 25000125 ZZHC RX 250: Performed by: NURSE ANESTHETIST, CERTIFIED REGISTERED

## 2019-01-04 PROCEDURE — 37000008 ZZH ANESTHESIA TECHNICAL FEE, 1ST 30 MIN: Performed by: SURGERY

## 2019-01-04 PROCEDURE — 27210794 ZZH OR GENERAL SUPPLY STERILE: Performed by: SURGERY

## 2019-01-04 PROCEDURE — 47562 LAPAROSCOPIC CHOLECYSTECTOMY: CPT | Mod: AS | Performed by: PHYSICIAN ASSISTANT

## 2019-01-04 PROCEDURE — 47562 LAPAROSCOPIC CHOLECYSTECTOMY: CPT | Performed by: SURGERY

## 2019-01-04 PROCEDURE — 36000056 ZZH SURGERY LEVEL 3 1ST 30 MIN: Performed by: SURGERY

## 2019-01-04 PROCEDURE — 71000015 ZZH RECOVERY PHASE 1 LEVEL 2 EA ADDTL HR: Performed by: SURGERY

## 2019-01-04 PROCEDURE — 27110028 ZZH OR GENERAL SUPPLY NON-STERILE: Performed by: SURGERY

## 2019-01-04 PROCEDURE — 25000128 H RX IP 250 OP 636: Performed by: SURGERY

## 2019-01-04 PROCEDURE — 71000027 ZZH RECOVERY PHASE 2 EACH 15 MINS: Performed by: SURGERY

## 2019-01-04 PROCEDURE — 25800025 ZZH RX 258: Performed by: SURGERY

## 2019-01-04 PROCEDURE — 85610 PROTHROMBIN TIME: CPT | Performed by: NURSE ANESTHETIST, CERTIFIED REGISTERED

## 2019-01-04 PROCEDURE — 88304 TISSUE EXAM BY PATHOLOGIST: CPT | Performed by: SURGERY

## 2019-01-04 RX ORDER — MEPERIDINE HYDROCHLORIDE 25 MG/ML
12.5 INJECTION INTRAMUSCULAR; INTRAVENOUS; SUBCUTANEOUS
Status: DISCONTINUED | OUTPATIENT
Start: 2019-01-04 | End: 2019-01-04 | Stop reason: HOSPADM

## 2019-01-04 RX ORDER — HYDROMORPHONE HYDROCHLORIDE 1 MG/ML
.3-.5 INJECTION, SOLUTION INTRAMUSCULAR; INTRAVENOUS; SUBCUTANEOUS EVERY 10 MIN PRN
Status: DISCONTINUED | OUTPATIENT
Start: 2019-01-04 | End: 2019-01-04 | Stop reason: HOSPADM

## 2019-01-04 RX ORDER — ALBUTEROL SULFATE 0.83 MG/ML
2.5 SOLUTION RESPIRATORY (INHALATION) EVERY 4 HOURS PRN
Status: DISCONTINUED | OUTPATIENT
Start: 2019-01-04 | End: 2019-01-04 | Stop reason: HOSPADM

## 2019-01-04 RX ORDER — FENTANYL CITRATE 50 UG/ML
INJECTION, SOLUTION INTRAMUSCULAR; INTRAVENOUS PRN
Status: DISCONTINUED | OUTPATIENT
Start: 2019-01-04 | End: 2019-01-04

## 2019-01-04 RX ORDER — NEOSTIGMINE METHYLSULFATE 1 MG/ML
VIAL (ML) INJECTION PRN
Status: DISCONTINUED | OUTPATIENT
Start: 2019-01-04 | End: 2019-01-04

## 2019-01-04 RX ORDER — DEXAMETHASONE SODIUM PHOSPHATE 4 MG/ML
INJECTION, SOLUTION INTRA-ARTICULAR; INTRALESIONAL; INTRAMUSCULAR; INTRAVENOUS; SOFT TISSUE PRN
Status: DISCONTINUED | OUTPATIENT
Start: 2019-01-04 | End: 2019-01-04

## 2019-01-04 RX ORDER — NALOXONE HYDROCHLORIDE 0.4 MG/ML
.1-.4 INJECTION, SOLUTION INTRAMUSCULAR; INTRAVENOUS; SUBCUTANEOUS
Status: DISCONTINUED | OUTPATIENT
Start: 2019-01-04 | End: 2019-01-04 | Stop reason: HOSPADM

## 2019-01-04 RX ORDER — CEFAZOLIN SODIUM 1 G/50ML
1 INJECTION, SOLUTION INTRAVENOUS SEE ADMIN INSTRUCTIONS
Status: DISCONTINUED | OUTPATIENT
Start: 2019-01-04 | End: 2019-01-04 | Stop reason: HOSPADM

## 2019-01-04 RX ORDER — HYDROCODONE BITARTRATE AND ACETAMINOPHEN 5; 325 MG/1; MG/1
1-2 TABLET ORAL EVERY 6 HOURS PRN
Qty: 45 TABLET | Refills: 0 | Status: SHIPPED | OUTPATIENT
Start: 2019-01-04 | End: 2019-05-22

## 2019-01-04 RX ORDER — HYDROCODONE BITARTRATE AND ACETAMINOPHEN 5; 325 MG/1; MG/1
1-2 TABLET ORAL EVERY 4 HOURS PRN
Status: DISCONTINUED | OUTPATIENT
Start: 2019-01-04 | End: 2019-01-04 | Stop reason: HOSPADM

## 2019-01-04 RX ORDER — HYDROCODONE BITARTRATE AND ACETAMINOPHEN 5; 325 MG/1; MG/1
1 TABLET ORAL EVERY 6 HOURS PRN
Status: DISCONTINUED | OUTPATIENT
Start: 2019-01-04 | End: 2019-01-04 | Stop reason: HOSPADM

## 2019-01-04 RX ORDER — ALBUTEROL SULFATE 0.83 MG/ML
2.5 SOLUTION RESPIRATORY (INHALATION) ONCE
Status: DISCONTINUED | OUTPATIENT
Start: 2019-01-04 | End: 2019-01-04 | Stop reason: HOSPADM

## 2019-01-04 RX ORDER — SODIUM CHLORIDE, SODIUM LACTATE, POTASSIUM CHLORIDE, CALCIUM CHLORIDE 600; 310; 30; 20 MG/100ML; MG/100ML; MG/100ML; MG/100ML
INJECTION, SOLUTION INTRAVENOUS CONTINUOUS
Status: DISCONTINUED | OUTPATIENT
Start: 2019-01-04 | End: 2019-01-04 | Stop reason: HOSPADM

## 2019-01-04 RX ORDER — FENTANYL CITRATE 50 UG/ML
25-50 INJECTION, SOLUTION INTRAMUSCULAR; INTRAVENOUS
Status: DISCONTINUED | OUTPATIENT
Start: 2019-01-04 | End: 2019-01-04 | Stop reason: HOSPADM

## 2019-01-04 RX ORDER — KETOROLAC TROMETHAMINE 30 MG/ML
30 INJECTION, SOLUTION INTRAMUSCULAR; INTRAVENOUS EVERY 6 HOURS PRN
Status: DISCONTINUED | OUTPATIENT
Start: 2019-01-04 | End: 2019-01-04 | Stop reason: HOSPADM

## 2019-01-04 RX ORDER — PROPOFOL 10 MG/ML
INJECTION, EMULSION INTRAVENOUS PRN
Status: DISCONTINUED | OUTPATIENT
Start: 2019-01-04 | End: 2019-01-04

## 2019-01-04 RX ORDER — ONDANSETRON 2 MG/ML
4 INJECTION INTRAMUSCULAR; INTRAVENOUS EVERY 30 MIN PRN
Status: DISCONTINUED | OUTPATIENT
Start: 2019-01-04 | End: 2019-01-04 | Stop reason: HOSPADM

## 2019-01-04 RX ORDER — GLYCOPYRROLATE 0.2 MG/ML
INJECTION, SOLUTION INTRAMUSCULAR; INTRAVENOUS PRN
Status: DISCONTINUED | OUTPATIENT
Start: 2019-01-04 | End: 2019-01-04

## 2019-01-04 RX ORDER — DEXAMETHASONE SODIUM PHOSPHATE 4 MG/ML
4 INJECTION, SOLUTION INTRA-ARTICULAR; INTRALESIONAL; INTRAMUSCULAR; INTRAVENOUS; SOFT TISSUE EVERY 10 MIN PRN
Status: DISCONTINUED | OUTPATIENT
Start: 2019-01-04 | End: 2019-01-04 | Stop reason: HOSPADM

## 2019-01-04 RX ORDER — ONDANSETRON 2 MG/ML
INJECTION INTRAMUSCULAR; INTRAVENOUS PRN
Status: DISCONTINUED | OUTPATIENT
Start: 2019-01-04 | End: 2019-01-04

## 2019-01-04 RX ORDER — ONDANSETRON 4 MG/1
4 TABLET, ORALLY DISINTEGRATING ORAL EVERY 30 MIN PRN
Status: DISCONTINUED | OUTPATIENT
Start: 2019-01-04 | End: 2019-01-04 | Stop reason: HOSPADM

## 2019-01-04 RX ORDER — LABETALOL HYDROCHLORIDE 5 MG/ML
10 INJECTION, SOLUTION INTRAVENOUS
Status: DISCONTINUED | OUTPATIENT
Start: 2019-01-04 | End: 2019-01-04 | Stop reason: HOSPADM

## 2019-01-04 RX ORDER — ALBUTEROL SULFATE 0.83 MG/ML
2.5 SOLUTION RESPIRATORY (INHALATION) ONCE
Status: COMPLETED | OUTPATIENT
Start: 2019-01-04 | End: 2019-01-04

## 2019-01-04 RX ORDER — CEFAZOLIN SODIUM 2 G/100ML
2 INJECTION, SOLUTION INTRAVENOUS
Status: DISCONTINUED | OUTPATIENT
Start: 2019-01-04 | End: 2019-01-04 | Stop reason: HOSPADM

## 2019-01-04 RX ORDER — LIDOCAINE 40 MG/G
CREAM TOPICAL
Status: DISCONTINUED | OUTPATIENT
Start: 2019-01-04 | End: 2019-01-04 | Stop reason: HOSPADM

## 2019-01-04 RX ORDER — BUPIVACAINE HYDROCHLORIDE AND EPINEPHRINE 5; 5 MG/ML; UG/ML
INJECTION, SOLUTION PERINEURAL PRN
Status: DISCONTINUED | OUTPATIENT
Start: 2019-01-04 | End: 2019-01-04 | Stop reason: HOSPADM

## 2019-01-04 RX ADMIN — FENTANYL CITRATE 50 MCG: 50 INJECTION, SOLUTION INTRAMUSCULAR; INTRAVENOUS at 11:42

## 2019-01-04 RX ADMIN — FENTANYL CITRATE 50 MCG: 50 INJECTION, SOLUTION INTRAMUSCULAR; INTRAVENOUS at 10:00

## 2019-01-04 RX ADMIN — LIDOCAINE HYDROCHLORIDE 1 ML: 10 INJECTION, SOLUTION EPIDURAL; INFILTRATION; INTRACAUDAL; PERINEURAL at 09:11

## 2019-01-04 RX ADMIN — FENTANYL CITRATE 50 MCG: 50 INJECTION, SOLUTION INTRAMUSCULAR; INTRAVENOUS at 10:25

## 2019-01-04 RX ADMIN — PROPOFOL 180 MG: 10 INJECTION, EMULSION INTRAVENOUS at 09:57

## 2019-01-04 RX ADMIN — MIDAZOLAM 1 MG: 1 INJECTION INTRAMUSCULAR; INTRAVENOUS at 09:52

## 2019-01-04 RX ADMIN — Medication 5 MG: at 10:45

## 2019-01-04 RX ADMIN — KETOROLAC TROMETHAMINE 30 MG: 30 INJECTION, SOLUTION INTRAMUSCULAR at 12:07

## 2019-01-04 RX ADMIN — CEFAZOLIN SODIUM 2 G: 1 INJECTION, SOLUTION INTRAVENOUS at 09:51

## 2019-01-04 RX ADMIN — ALBUTEROL SULFATE 2.5 MG: 2.5 SOLUTION RESPIRATORY (INHALATION) at 09:32

## 2019-01-04 RX ADMIN — FENTANYL CITRATE 50 MCG: 50 INJECTION, SOLUTION INTRAMUSCULAR; INTRAVENOUS at 09:54

## 2019-01-04 RX ADMIN — FENTANYL CITRATE 50 MCG: 50 INJECTION, SOLUTION INTRAMUSCULAR; INTRAVENOUS at 10:27

## 2019-01-04 RX ADMIN — ONDANSETRON 4 MG: 2 INJECTION INTRAMUSCULAR; INTRAVENOUS at 09:55

## 2019-01-04 RX ADMIN — FENTANYL CITRATE 50 MCG: 50 INJECTION, SOLUTION INTRAMUSCULAR; INTRAVENOUS at 10:21

## 2019-01-04 RX ADMIN — FENTANYL CITRATE 100 MCG: 50 INJECTION, SOLUTION INTRAMUSCULAR; INTRAVENOUS at 10:16

## 2019-01-04 RX ADMIN — HYDROMORPHONE HYDROCHLORIDE 0.5 MG: 1 INJECTION, SOLUTION INTRAMUSCULAR; INTRAVENOUS; SUBCUTANEOUS at 12:12

## 2019-01-04 RX ADMIN — MIDAZOLAM 1 MG: 1 INJECTION INTRAMUSCULAR; INTRAVENOUS at 09:55

## 2019-01-04 RX ADMIN — GLYCOPYRROLATE 1 MG: 0.2 INJECTION, SOLUTION INTRAMUSCULAR; INTRAVENOUS at 10:45

## 2019-01-04 RX ADMIN — GLYCOPYRROLATE 0.2 MG: 0.2 INJECTION, SOLUTION INTRAMUSCULAR; INTRAVENOUS at 09:54

## 2019-01-04 RX ADMIN — HYDROCODONE BITARTRATE AND ACETAMINOPHEN 1 TABLET: 5; 325 TABLET ORAL at 12:05

## 2019-01-04 RX ADMIN — DEXAMETHASONE SODIUM PHOSPHATE 4 MG: 4 INJECTION, SOLUTION INTRA-ARTICULAR; INTRALESIONAL; INTRAMUSCULAR; INTRAVENOUS; SOFT TISSUE at 09:55

## 2019-01-04 RX ADMIN — SODIUM CHLORIDE, POTASSIUM CHLORIDE, SODIUM LACTATE AND CALCIUM CHLORIDE: 600; 310; 30; 20 INJECTION, SOLUTION INTRAVENOUS at 10:50

## 2019-01-04 RX ADMIN — ROCURONIUM BROMIDE 35 MG: 10 INJECTION INTRAVENOUS at 09:45

## 2019-01-04 RX ADMIN — SODIUM CHLORIDE, POTASSIUM CHLORIDE, SODIUM LACTATE AND CALCIUM CHLORIDE: 600; 310; 30; 20 INJECTION, SOLUTION INTRAVENOUS at 09:12

## 2019-01-04 NOTE — DISCHARGE INSTRUCTIONS
DISCHARGE INSTRUCTIONS     1. You may resume your regular diet when you feel you are ready    2. No heavy lifting (>30lbs)  for 1 month.    3. You will have some discomfort at the incision sites. This is expected. This should improve over the next 2-3 days. Ice and pain medication will help with this pain. Use prescribed pain medication as instructed.     4. Some bruising and mild swelling is normal after surgery. The area below and around the incision(s) may be hard and elevated. This is part of the normal healing process. This will resolve slowly over the next several months.     5. Your wounds are covered with glue. The glue is water tight and so you can shower or bathe immediately following surgery.     6. Use the following medications (in addition to your normal meds) as shown:      Tylenol (acetaminophen) 500 mg every 6 hours as needed for pain.    Do not take more than 1000 mg of Tylenol every 6 hours -OR- 4g in a day    Motrin (ibuprophen) 600 mg every 6 hours as needed for pain. Take with food.     8. Notify Clinic at [phone] if:     Your discomfort is not relieved by your pain medication     You have signs of infection such as temperature above 100.4 degrees orally,  chills, or increasing daily discomfort.     Incision site is becoming more red and/or there is purulent drainage.      9. Follow up with Dr Siegel in 1-2 weeks.    10. When taking narcotics it is important to keep your stools soft to avoid constipation and pain with straining. This is best done by drinking and taking a stool softener (Metamucil or milk of magnesia).      11. Most people take the rest of the week off and return to work the following Monday. You may return sooner as pain allows. During your follow-up appointment, the doctor will give you a formal letter for your work with any restrictions detailed.  All disability or other such paperwork will be addressed at that time.                              Same Day Surgery Discharge  Instructions  Special Precautions After Surgery - Adult    1. It is not unusual to feel lightheaded or faint, up to 24 hours after surgery or while taking pain medication.  If you have these symptoms; sit for a few minutes before standing and have someone assist you when getting up.  2. You should rest and relax for the next 24 hours and must have someone stay with you for at least 24 hours after your discharge.  3. DO NOT DRIVE any vehicle or operate mechanical equipment for 24 hours following the end of your surgery.  DO NOT DRIVE while taking narcotic pain medications that have been prescribed by your physician.  If you had a limb operated on, you must be able to use it fully to drive.  4. DO NOT drink alcoholic beverages for 24 hours following surgery or while taking prescription pain medication.  5. Drink clear liquids (apple juice, ginger ale, broth, 7-Up, etc.).  Progress to your regular diet as you feel able.  6. Any questions call your physician and do not make important decisions for 24 hours.       Call for an appointment to return to the clinic in 1-2 weeks    Medications:  ? Hydrocodone (Norco, Vicodin):  Next dose: ___________.  ? Ibuprofen 600mg as directed.  ? Stool softener to avoid constipation.  ? Follow the instructions on the bottle.       __________________________________________________________________________________________________________________________________  IMPORTANT NUMBERS:    [facility] Main Number:  [phone], [phone]  Pharmacy:  [phone]  Same Day Surgery:  [phone], Monday - Friday until 8:30 p.m.  Urgent Care:  [phone]  Emergency Room:  [phone]      [facility]:  [phone]                                                                             [facility]:  [phone]

## 2019-01-04 NOTE — ANESTHESIA CARE TRANSFER NOTE
Patient: Bia Bill    Procedure(s):  Laparoscopic cholecystectomy    Diagnosis: gallstones  Diagnosis Additional Information: No value filed.    Anesthesia Type:   General     Note:  Airway :Face Mask  Patient transferred to:PACU  Handoff Report: Identifed the Patient, Identified the Reponsible Provider, Reviewed the pertinent medical history, Discussed the surgical course, Reviewed Intra-OP anesthesia mangement and issues during anesthesia, Set expectations for post-procedure period and Allowed opportunity for questions and acknowledgement of understanding      Vitals: (Last set prior to Anesthesia Care Transfer)    CRNA VITALS  1/4/2019 1051 - 1/4/2019 1121      1/4/2019             Resp Rate (observed):  2  (Abnormal)                 Electronically Signed By: Willie Whitten CRNA, APRN CRNA  January 4, 2019  11:21 AM

## 2019-01-04 NOTE — PROGRESS NOTES
"Patient more awake at noon, c/o pain 7/10 discussed pain medication plan with kandace tamayo crna. Patient states she is ok to take dilaudid, norco and torodol with her allergies. Dr. Siegel ok\"d torodol and wanted norco given to see patient's response. Upon administering dilaudid slowly, syringe poped off and I'm unsure of how much medicine was given, likely just half 0.25mg  "

## 2019-01-04 NOTE — OP NOTE
Procedure Date: 01/04/2019      PREOPERATIVE DIAGNOSIS:  Symptomatic cholelithiasis.      POSTOPERATIVE DIAGNOSIS:  Symptomatic cholelithiasis.      PROCEDURE:  Laparoscopic cholecystectomy.      SURGEON:  Aaron Siegel MD      ASSISTANT:  SADIE Duran (needed for expertise in camera operation, retraction, hemostasis and suctioning.      ANESTHESIA:  General.      ANESTHESIOLOGIST:  Willie Whitten CRNA.      FINDINGS:  Relatively uninflamed gallbladder with gallstone, successfully removed laparoscopically.      PROCEDURE:  The patient was taken to the operating room and placed in a supine position.  General endotracheal anesthesia was induced and surgical timeout was performed.  Ancef was given as perioperative antibiotics.  Her abdomen was cleaned and draped in a sterile manner.  Marcaine 0.25% with epinephrine was used to anesthetize all port sites.  A small supraumbilical vertical midline incision was made and the subcutaneous tissues dissected fascia.  The fascia was opened sharply and a 12 mm Bia trocar was inserted.  Carbon dioxide was insufflated to 15 mmHg.  Under direct vision, a separate subxiphoid 11 mm trocar was placed as were 2 right-sided 5 mm trocars.  Prior to the completion of the state of the case, a third right-sided 5 mm trocar had to be placed for retraction.  The gallbladder was located in the right upper quadrant.  It was gently grasped and elevated.  The fat surrounding the neck of the gallbladder was dissected free until the cystic duct was isolated.  It was encircled, clipped twice proximally, once distally and ligated.  A separate branch of the single branch of the cystic artery was located, clipped and ligated as well.  The gallbladder then removed from the liver bed using electrocautery was placed into an Endocatch bag and brought out through the subxiphoid port.  Two liters of warm normal saline solution was used to irrigate out the abdominal cavity, and this was sucked free  until the effluent was clear.  The liver bed was then draped with Surgicel.  The final inspection revealed no evidence of hemorrhage or leakage, and both cystic artery and duct stumps were intact with clips applied.  All trocars were removed under inversion vision and the air allowed to desufflate.  The fascial defect of the supraumbilical port was closed using an 0 Vicryl suture in a figure-of-eight fashion, and this was injected with Marcaine.  All wounds were irrigated with normal saline and the skin closed using 4-0 Vicryl subcuticular stitches and dressed with Dermabond.  The patient tolerated the procedure well, was extubated on the table and transferred to the PACU in stable condition.      PLAN:  Following a stable postoperative course, she will be discharged home with a regular diet.      ACTIVITY:  No heavy lifting for 1 month.      DISABILITY:  None.      MEDICATIONS:  A prescription was written for Vicodin.      INSTRUCTIONS:  The patient was advised to wash her wounds daily with soap and water and follow-up with me in 1-2 weeks.      ESTIMATED BLOOD LOSS:  5 mL.      INTRAVENOUS FLUIDS:  1000.         BENJAMIN WHITESIDE             D: 2019   T: 2019   MT: DOMENIC      Name:     EVERETT MORALES   MRN:      [id number]        Account:        [id number]   :      1965           Procedure Date: 2019      Document: [id number]       cc: Kd Leong MD

## 2019-01-04 NOTE — BRIEF OP NOTE
PreOp Dx: gallstones    PostOp Dx: Same    Procedure: lap jose    Surgeon: YUNIOR Siegel    Anesthesia: GET Fish CRNA    Findings: gb with small stones    IVF: 1000m    UOP: n/a    EBL: 5ml      Aaron Siegel MD

## 2019-01-04 NOTE — ANESTHESIA POSTPROCEDURE EVALUATION
Patient: Bia Bill    Procedure(s):  Laparoscopic cholecystectomy    Diagnosis:gallstones  Diagnosis Additional Information: No value filed.    Anesthesia Type:  General    Note:  Anesthesia Post Evaluation    Patient location during evaluation: Phase 2  Patient participation: Able to fully participate in evaluation  Level of consciousness: awake  Pain management: adequate  Airway patency: patent  Cardiovascular status: acceptable  Respiratory status: acceptable  Hydration status: acceptable  PONV: none     Anesthetic complications: None          Last vitals:  Vitals:    01/04/19 1230 01/04/19 1250 01/04/19 1300   BP: 127/57  114/60   Pulse: 86  88   Resp: 29  24   Temp:      SpO2: 93% 95% 94%         Electronically Signed By: Willie Whitten CRNA, APRN CRNA  January 4, 2019  1:38 PM

## 2019-01-07 DIAGNOSIS — F31.62 MODERATE MIXED BIPOLAR I DISORDER (H): Primary | Chronic | ICD-10-CM

## 2019-01-07 DIAGNOSIS — J44.1 COPD EXACERBATION (H): ICD-10-CM

## 2019-01-07 NOTE — TELEPHONE ENCOUNTER
"BUPROPN HCL X 150MG TAB PARP      Last Written Prescription Date:  12/6/18  Last Fill Quantity: 30,   # refills: 0  Last Office Visit: 1/3/19  Future Office visit:    Next 5 appointments (look out 90 days)    Jan 08, 2019  9:30 AM CST  Return Visit with Kd Morris  [facility] ([facility]) [address] [address]  [phone]   Jan 11, 2019  9:45 AM CST  Return Visit with Aaron Siegel MD  [facility] ([facility] [address] [address]  [phone]   Jan 24, 2019 10:30 AM CST  Return Visit with Kd Hutchinsifton  [facility] ([facility] [address] [address]  [phone]   Mar 29, 2019  2:00 PM CDT  Return Visit with Joshua Watts MD  [facility] ([facility]) [facility]  [address]  [facility] [address]  [phone]           Requested Prescriptions   Pending Prescriptions Disp Refills     buPROPion (WELLBUTRIN XL) 150 MG 24 hr tablet [Pharmacy Med Name: BUPROPN HCL X 150MG TAB PARP] 30 tablet 0     Sig: TAKE ONE TABLET BY MOUTH EVERY DAY IN THE MORNING.    SSRIs Protocol Passed - 1/7/2019  9:33 AM       Passed - Recent (12 mo) or future (30 days) visit within the authorizing provider's specialty    Patient had office visit in the last 12 months or has a visit in the next 30 days with authorizing provider or within the authorizing provider's specialty.  See \"Patient Info\" tab in inbasket, or \"Choose Columns\" in Meds & Orders section of the refill encounter.             Passed - Medication is Bupropion    If the medication is Bupropion (Wellbutrin), and the patient is taking for smoking cessation; OK to refill.         Passed - Medication is active on med list       Passed - Patient is age 18 or older       Passed - No active pregnancy on record       Passed - No positive pregnancy test in last 12 " months

## 2019-01-08 ENCOUNTER — OFFICE VISIT (OUTPATIENT)
Dept: PSYCHOLOGY | Facility: CLINIC | Age: 54
End: 2019-01-08
Payer: COMMERCIAL

## 2019-01-08 ENCOUNTER — PATIENT OUTREACH (OUTPATIENT)
Dept: CARE COORDINATION | Facility: CLINIC | Age: 54
End: 2019-01-08

## 2019-01-08 ENCOUNTER — ANTICOAGULATION THERAPY VISIT (OUTPATIENT)
Dept: ANTICOAGULATION | Facility: CLINIC | Age: 54
End: 2019-01-08

## 2019-01-08 DIAGNOSIS — I82.409 DVT (DEEP VENOUS THROMBOSIS) (H): ICD-10-CM

## 2019-01-08 DIAGNOSIS — I73.9 PVD (PERIPHERAL VASCULAR DISEASE) WITH CLAUDICATION (H): ICD-10-CM

## 2019-01-08 DIAGNOSIS — I63.9 STROKE (H): ICD-10-CM

## 2019-01-08 DIAGNOSIS — F31.62 MODERATE MIXED BIPOLAR I DISORDER (H): Primary | ICD-10-CM

## 2019-01-08 DIAGNOSIS — Z79.01 LONG TERM CURRENT USE OF ANTICOAGULANT THERAPY: ICD-10-CM

## 2019-01-08 DIAGNOSIS — Z86.73 HISTORY OF STROKE: ICD-10-CM

## 2019-01-08 LAB
COPATH REPORT: NORMAL
HCT VFR BLD AUTO: 58.9 % (ref 35–47)
INR PPP: 1.68 (ref 0.86–1.14)

## 2019-01-08 PROCEDURE — 90834 PSYTX W PT 45 MINUTES: CPT | Performed by: PSYCHOLOGIST

## 2019-01-08 PROCEDURE — 36415 COLL VENOUS BLD VENIPUNCTURE: CPT | Performed by: FAMILY MEDICINE

## 2019-01-08 PROCEDURE — 85014 HEMATOCRIT: CPT | Performed by: FAMILY MEDICINE

## 2019-01-08 PROCEDURE — 85610 PROTHROMBIN TIME: CPT | Performed by: FAMILY MEDICINE

## 2019-01-08 PROCEDURE — 99207 ZZC NO CHARGE NURSE ONLY: CPT

## 2019-01-08 RX ORDER — BUPROPION HYDROCHLORIDE 150 MG/1
TABLET ORAL
Qty: 30 TABLET | Refills: 5 | Status: SHIPPED | OUTPATIENT
Start: 2019-01-08 | End: 2019-07-12

## 2019-01-08 ASSESSMENT — ACTIVITIES OF DAILY LIVING (ADL)
DEPENDENT_IADLS:: INDEPENDENT
DEPENDENT_IADLS:: INDEPENDENT

## 2019-01-08 NOTE — PROGRESS NOTES
ANTICOAGULATION FOLLOW-UP CLINIC VISIT    Patient Name:  Bia Bill  Date:  2019  Contact Type:  Telephone    SUBJECTIVE:     Patient Findings     Positives:   Extra doses, Intentional hold of therapy    Comments:   Patient had a previous hold.   Patient's INR is 1.68 today. Writer instructed patient to take 15 mg today then to resume maintenance dose.   Patient will continue on Lovenox injections.  Recheck the INR in 3 days.   Patient verbalizes understanding and agrees to plan. No further questions or concerns.             OBJECTIVE    INR   Date Value Ref Range Status   2019 1.68 (H) 0.86 - 1.14 Final     Comment:     Special tube used to correct for high hematocrit       ASSESSMENT / PLAN  INR assessment SUB    Recheck INR In: 3 DAYS    INR Location Outside lab      Anticoagulation Summary  As of 2019    INR goal:   2.0-2.5   TTR:   49.7 % (3.8 y)   INR used for dosin.68! (2019)   Warfarin maintenance plan:   10 mg (5 mg x 2) every Mon, Wed, Fri; 12.5 mg (5 mg x 2.5) all other days   Full warfarin instructions:   : 15 mg; Otherwise 10 mg every Mon, Wed, Fri; 12.5 mg all other days   Weekly warfarin total:   80 mg   Plan last modified:   Angelina Nj RN (2018)   Next INR check:   2019   Priority:   INR   Target end date:   Indefinite    Indications    PVD (peripheral vascular disease) with claudication (H) [I73.9]  Long term current use of anticoagulant therapy [Z79.01]  DVT (deep venous thrombosis) (H) [I82.409]  Stroke (H) [I63.9]             Anticoagulation Episode Summary     INR check location:       Preferred lab:       Send INR reminders to:   [place] TATIANA [employer] POOL    Comments:   * lab draw due to elevated hematocrit (fingerstick meters don't work). LEFT LE. STENT x 3 LEFT UPPER LEG. Previous arterial clot. New goal range 2-2.5 starting 17.      Anticoagulation Care Providers     Provider Role Specialty Phone number    Kd Leong MD  [facility] [phone]            See the Encounter Report to view Anticoagulation Flowsheet and Dosing Calendar (Go to Encounters tab in chart review, and find the Anticoagulation Therapy Visit)        Reina Forrest RN

## 2019-01-08 NOTE — PROGRESS NOTES
Clinic Care Coordination Contact    Clinic Care Coordination Contact  OUTREACH    Referral Information:  Referral Source: ED Follow-Up    Primary Diagnosis: GI Disorders    Chief Complaint   Patient presents with     Clinic Care Coordination - Follow-up     Care Team        Cecilia Utilization:   Clinic Utilization  Difficulty keeping appointments:: No  Compliance Concerns: No  No-Show Concerns: No  No PCP office visit in Past Year: No  Utilization    Last refreshed: 1/8/2019 12:23 PM:  Hospital Admissions 0           Last refreshed: 1/8/2019 12:23 PM:  ED Visits 4           Last refreshed: 1/8/2019 11:02 AM:  No Show Count (past year) 3              Current as of: 1/8/2019 11:02 AM              Clinical Concerns:  Current Medical Concerns:  Patient reports she feels like there is a knot in her abdomen between her surgical sites. Liyah is slightly swollen and tender to touch. No other s/s of infection reported. Patient has appointment with surgeon on 1-11. Care Coordinator encouraged Patient to bring this concern up to them to at least get someone to look at it. No other concerns reported.    Current Behavioral Concerns: none    Education Provided to patient: Encouraged follow up appointment with PCP.     Pain  Pain (GOAL):: Yes  Type: Acute (<3mo)  Location of chronic pain:: abdomen  Radiating: No  Progression: Unchanged  Description of pain: Aching  Chronic pain severity:: 5  Limitation of routine activities due to chronic pain:: Yes  Description: Able to do most things most days with some rest  Alleviating Factors: Rest, Pain Medication  Aggravating Factors: Activity, Positioning    Medication Management:  Patient is knowledgeable on medications and is adherent. No financial concerns reported at this time.        Functional Status:  Dependent ADLs:: Independent  Dependent IADLs:: Independent  Bed or wheelchair confined:: No  Mobility Status: Independent  Fallen 2 or more times in the past year?: No  Any fall with  injury in the past year?: No    Living Situation:  Current living arrangement:: I live in a private home with family  Type of residence:: Private home - stairs    Diet/Exercise/Sleep:  Diet:: Regular  Inadequate nutrition (GOAL):: No  Food Insecurity: No  Tube Feeding: No  Exercise:: Currently not exercising    Transportation:  Transportation concerns (GOAL):: No  Transportation means:: Regular car     Psychosocial:  Muslim or spiritual beliefs that impact treatment:: No  Mental health DX:: No  Mental health management concern (GOAL):: No  Informal Support system:: Family, Friends     Financial/Insurance:   Financial/Insurance concerns (GOAL):: No       Resources and Interventions:  Current Resources:    ;   Community Resources: None  Supplies used at home:: None  Equipment Currently Used at Home: none         Referrals Placed: None        Future Appointments              In 3 days Aaron Siegel MD [facility] [address]    In 2 weeks [facility]    In 4 weeks [facility]    In 2 months [facility]    In 2 months Joshua Watts MD [facility]          Plan: 1) Patient will continue to follow treatment plan as directed and follow up with PCP with concerns ongoing.   2) Care Coordination to remain available for future needs. Follow up planned for 3 weeks unless otherwise notified.     Wei Gray RN  Clinic Care Coordinator  [phone] or [phone]

## 2019-01-08 NOTE — PROGRESS NOTES
Clinic Care Coordination Contact  [facility]/Voicemail    Referral Source: ED Follow-Up  Clinical Data: Care Coordinator Outreach  Outreach attempted x 1.  Left message on voicemail with call back information and requested return call.  Plan: Care Coordinator mailed out care coordination introduction letter on 12-18. Care Coordinator will try to reach patient again in 1-2 business days.  Wei Gray RN  Clinic Care Coordinator  [phone] or [phone]

## 2019-01-08 NOTE — TELEPHONE ENCOUNTER
Per review  of chart  Pt seen by psychology 12/13/2018  nop change in meds  Routing refill request to provider for review/approval because:  Drug not on the [facility] refill protocol or controlled substance  CLARKE Vasquez  Clinic  RN/Kristofer Wing

## 2019-01-11 ENCOUNTER — OFFICE VISIT (OUTPATIENT)
Dept: SURGERY | Facility: CLINIC | Age: 54
End: 2019-01-11
Payer: COMMERCIAL

## 2019-01-11 ENCOUNTER — ANTICOAGULATION THERAPY VISIT (OUTPATIENT)
Dept: ANTICOAGULATION | Facility: CLINIC | Age: 54
End: 2019-01-11
Payer: COMMERCIAL

## 2019-01-11 VITALS
BODY MASS INDEX: 48.91 KG/M2 | OXYGEN SATURATION: 94 % | HEART RATE: 83 BPM | TEMPERATURE: 98.2 F | SYSTOLIC BLOOD PRESSURE: 125 MMHG | HEIGHT: 58 IN | DIASTOLIC BLOOD PRESSURE: 54 MMHG | WEIGHT: 233 LBS

## 2019-01-11 DIAGNOSIS — Z86.73 HISTORY OF STROKE: ICD-10-CM

## 2019-01-11 DIAGNOSIS — I82.409 DVT (DEEP VENOUS THROMBOSIS) (H): ICD-10-CM

## 2019-01-11 DIAGNOSIS — Z09 POSTOP CHECK: ICD-10-CM

## 2019-01-11 DIAGNOSIS — G89.18 POSTOPERATIVE PAIN: Primary | ICD-10-CM

## 2019-01-11 DIAGNOSIS — I63.9 STROKE (H): ICD-10-CM

## 2019-01-11 DIAGNOSIS — Z79.01 LONG TERM CURRENT USE OF ANTICOAGULANT THERAPY: ICD-10-CM

## 2019-01-11 DIAGNOSIS — I73.9 PVD (PERIPHERAL VASCULAR DISEASE) WITH CLAUDICATION (H): ICD-10-CM

## 2019-01-11 LAB
HCT VFR BLD AUTO: 60.6 % (ref 35–47)
INR PPP: 2.26 (ref 0.86–1.14)

## 2019-01-11 PROCEDURE — 85610 PROTHROMBIN TIME: CPT | Performed by: FAMILY MEDICINE

## 2019-01-11 PROCEDURE — 99024 POSTOP FOLLOW-UP VISIT: CPT | Performed by: SURGERY

## 2019-01-11 PROCEDURE — 36415 COLL VENOUS BLD VENIPUNCTURE: CPT | Performed by: FAMILY MEDICINE

## 2019-01-11 PROCEDURE — 85014 HEMATOCRIT: CPT | Performed by: FAMILY MEDICINE

## 2019-01-11 PROCEDURE — 99207 ZZC NO CHARGE NURSE ONLY: CPT

## 2019-01-11 RX ORDER — WARFARIN SODIUM 5 MG/1
TABLET ORAL
Qty: 205 TABLET | Refills: 0 | Status: SHIPPED | OUTPATIENT
Start: 2019-01-11 | End: 2019-03-13

## 2019-01-11 RX ORDER — HYDROCODONE BITARTRATE AND ACETAMINOPHEN 5; 325 MG/1; MG/1
1-2 TABLET ORAL EVERY 6 HOURS PRN
Qty: 30 TABLET | Refills: 0 | Status: SHIPPED | OUTPATIENT
Start: 2019-01-11 | End: 2019-05-22

## 2019-01-11 ASSESSMENT — ANXIETY QUESTIONNAIRES
GAD7 TOTAL SCORE: 7
5. BEING SO RESTLESS THAT IT IS HARD TO SIT STILL: SEVERAL DAYS
3. WORRYING TOO MUCH ABOUT DIFFERENT THINGS: SEVERAL DAYS
6. BECOMING EASILY ANNOYED OR IRRITABLE: SEVERAL DAYS
1. FEELING NERVOUS, ANXIOUS, OR ON EDGE: SEVERAL DAYS
2. NOT BEING ABLE TO STOP OR CONTROL WORRYING: SEVERAL DAYS
4. TROUBLE RELAXING: MORE THAN HALF THE DAYS
7. FEELING AFRAID AS IF SOMETHING AWFUL MIGHT HAPPEN: NOT AT ALL

## 2019-01-11 ASSESSMENT — MIFFLIN-ST. JEOR: SCORE: 1556.63

## 2019-01-11 ASSESSMENT — PATIENT HEALTH QUESTIONNAIRE - PHQ9: SUM OF ALL RESPONSES TO PHQ QUESTIONS 1-9: 10

## 2019-01-11 NOTE — PROGRESS NOTES

## 2019-01-11 NOTE — LETTER
"    1/11/2019         RE: Bia Bill  [address]  [place] [address]        Dear Colleague,    Thank you for referring your patient, Bia Bill, to the [facility]. Please see a copy of my visit note below.    No complaints.  Pain controlled with oral pain meds.    /54 (BP Location: Right arm, Patient Position: Sitting, Cuff Size: Adult Large)   Pulse 83   Temp 98.2  F (36.8  C) (Oral)   Ht 1.473 m (4' 10\")   Wt 105.7 kg (233 lb)   LMP 09/27/2009   SpO2 94%   BMI 48.70 kg/m       Exam  [id number]  CTAB  RRR  S&NTND+BS, wounds - cdi s erythema  No CCE    A/P s/p lap jose healing well.  No heavy lifting for 2 weeks.  RTC prn.    Aaron Siegel MD     Again, thank you for allowing me to participate in the care of your patient.        Sincerely,        Aaron Siegel MD    "

## 2019-01-11 NOTE — NURSING NOTE
"Initial /54 (BP Location: Right arm, Patient Position: Sitting, Cuff Size: Adult Large)   Pulse 83   Temp 98.2  F (36.8  C) (Oral)   Ht 1.473 m (4' 10\")   Wt 105.7 kg (233 lb)   LMP 09/27/2009   BMI 48.70 kg/m   Estimated body mass index is 48.7 kg/m  as calculated from the following:    Height as of this encounter: 1.473 m (4' 10\").    Weight as of this encounter: 105.7 kg (233 lb). .    Joseline Garnett MA    "

## 2019-01-11 NOTE — PROGRESS NOTES
"No complaints.  Pain controlled with oral pain meds.    /54 (BP Location: Right arm, Patient Position: Sitting, Cuff Size: Adult Large)   Pulse 83   Temp 98.2  F (36.8  C) (Oral)   Ht 1.473 m (4' 10\")   Wt 105.7 kg (233 lb)   LMP 09/27/2009   SpO2 94%   BMI 48.70 kg/m      Exam  [id number]  CTAB  RRR  S&NTND+BS, wounds - cdi s erythema  No CCE    A/P s/p lap jose healing well.  No heavy lifting for 2 weeks.  RTC prn.    Aaron Siegel MD   "

## 2019-01-11 NOTE — PROGRESS NOTES
ANTICOAGULATION FOLLOW-UP CLINIC VISIT    Patient Name:  Bia Bill  Date:  2019  Contact Type:  Telephone    SUBJECTIVE:     Patient Findings     Positives:   Extra doses    Comments:   Previous hold. Extra doses given for sub-therapeutic INR.  INR 2.1 today. Patient will continue on maintenance dose. Patient has no Lovenox injections left and declines to continue.  Recheck the INR at the lab on Tuesday.   Patient verbalizes understanding and agrees to plan. No further questions or concerns.             OBJECTIVE    INR   Date Value Ref Range Status   2019 2.26 (H) 0.86 - 1.14 Final     Comment:     Special tube used to correct for high hematocrit       ASSESSMENT / PLAN  INR assessment THER    Recheck INR In: 4 DAYS    INR Location Outside lab      Anticoagulation Summary  As of 2019    INR goal:   2.0-2.5   TTR:   49.7 % (3.8 y)   INR used for dosin.26 (2019)   Warfarin maintenance plan:   10 mg (5 mg x 2) every Mon, Wed, Fri; 12.5 mg (5 mg x 2.5) all other days   Full warfarin instructions:   10 mg every Mon, Wed, Fri; 12.5 mg all other days   Weekly warfarin total:   80 mg   Plan last modified:   Angelina Nj RN (2018)   Next INR check:   1/15/2019   Priority:   INR   Target end date:   Indefinite    Indications    PVD (peripheral vascular disease) with claudication (H) [I73.9]  Long term current use of anticoagulant therapy [Z79.01]  DVT (deep venous thrombosis) (H) [I82.409]  Stroke (H) [I63.9]             Anticoagulation Episode Summary     INR check location:       Preferred lab:       Send INR reminders to:   [place] PHONE [employer]    Comments:   * lab draw due to elevated hematocrit (fingerstick meters don't work). LEFT LE. STENT x 3 LEFT UPPER LEG. Previous arterial clot. New goal range 2-2.5 starting 17.      Anticoagulation Care Providers     Provider Role Specialty Phone number    Kd Leong MD [facility] [phone]             See the Encounter Report to view Anticoagulation Flowsheet and Dosing Calendar (Go to Encounters tab in chart review, and find the Anticoagulation Therapy Visit)        Reina Forrest RN

## 2019-01-12 ASSESSMENT — ANXIETY QUESTIONNAIRES: GAD7 TOTAL SCORE: 7

## 2019-01-14 DIAGNOSIS — I89.0 LYMPHEDEMA OF BOTH LOWER EXTREMITIES: ICD-10-CM

## 2019-01-14 DIAGNOSIS — M10.9 ACUTE GOUTY ARTHRITIS: ICD-10-CM

## 2019-01-14 RX ORDER — ALLOPURINOL 300 MG/1
TABLET ORAL
Qty: 90 TABLET | Refills: 3 | Status: SHIPPED | OUTPATIENT
Start: 2019-01-14 | End: 2019-05-22

## 2019-01-14 RX ORDER — FUROSEMIDE 20 MG
TABLET ORAL
Qty: 360 TABLET | Refills: 3 | Status: SHIPPED | OUTPATIENT
Start: 2019-01-14 | End: 2019-12-09

## 2019-01-14 NOTE — TELEPHONE ENCOUNTER
ALLOPURINOL 300 MG  TAB NORT      Last Written Prescription Date:  10/8/18  Last Fill Quantity: 90,   # refills: 0  Last Office Visit: 1/3/19  Future Office visit:    Next 5 appointments (look out 90 days)    Jan 24, 2019 10:30 AM CST  Return Visit with Kd CAMMY Morris  [facility] [address] [place] [address]  [phone]   Feb 06, 2019  9:30 AM CST  Return Visit with Kd CAMMY [facility] ([facility]) [address] [place] [address]  [phone]   Mar 29, 2019  2:00 PM CDT  Return Visit with Joshua Watts MD  [facility] ([facility]) [facility] [address] [address]  [phone]           furosemide (LASIX) 20 MG tablet      Last Written Prescription Date:  12/12/18  Last Fill Quantity: 60,   # refills: 1  Last Office Visit: 1/3/19  Future Office visit:    Next 5 appointments (look out 90 days)    Jan 24, 2019 10:30 AM CST  Return Visit with Kd CHAWLA [address] [address]  [phone]   Feb 06, 2019  9:30 AM CST  Return Visit with Kd [address] [address]  [phone]   Mar 29, 2019  2:00 PM CDT  Return Visit with Joshua Watts MD  [facility] ([facility]) [facility] [address]  [facility] [address]  [phone]           Requested Prescriptions   Pending Prescriptions Disp Refills     allopurinol (ZYLOPRIM) 300 MG tablet [Pharmacy Med Name: ALLOPURINOL 300 MG  TAB NORT] 30 tablet 0     Sig: TAKE ONE TABLET BY MOUTH ONE TIME DAILY    Gout Agents Protocol Passed - 1/14/2019 11:36 AM       Passed - CBC on file in past 12 months    Recent Labs   Lab Test 01/11/19  1016  01/03/19  1441   WBC  --   --  " 5.5   RBC  --   --  6.66*   HGB  --   --  19.3*   HCT 60.6*   < > 62.2*   PLT  --   --  116*    < > = values in this interval not displayed.                Passed - ALT on file in past 12 months    Recent Labs   Lab Test 12/18/18  0109   ALT 27            Passed - Has Uric Acid on file in past 12 months and value is less than 6    Recent Labs   Lab Test 12/13/18  1137   URIC 4.5     If level is 6mg/dL or greater, ok to refill one time and refer to provider.          Passed - Recent (12 mo) or future (30 days) visit within the authorizing provider's specialty    Patient had office visit in the last 12 months or has a visit in the next 30 days with authorizing provider or within the authorizing provider's specialty.  See \"Patient Info\" tab in inbasket, or \"Choose Columns\" in Meds & Orders section of the refill encounter.             Passed - Medication is active on med list       Passed - Patient is age 18 or older       Passed - No active pregnancy on record       Passed - Normal serum creatinine on file in the past 12 months    Recent Labs   Lab Test 12/18/18  0109 12/12/11  1911   CR 0.74   < >  --    CREAT  --   --  0.7    < > = values in this interval not displayed.            Passed - No positive pregnancy test in past year        furosemide (LASIX) 20 MG tablet [Pharmacy Med Name: FUROSEMIDE 20 MG    TAB SUNNY] 120 tablet 0     Sig: TAKE TWO TABLETS BY MOUTH TWICE DAILY    Diuretics (Including Combos) Protocol Passed - 1/14/2019 11:36 AM       Passed - Blood pressure under 140/90 in past 12 months    BP Readings from Last 3 Encounters:   01/11/19 125/54   01/04/19 120/64   01/03/19 130/64                Passed - Recent (12 mo) or future (30 days) visit within the authorizing provider's specialty    Patient had office visit in the last 12 months or has a visit in the next 30 days with authorizing provider or within the authorizing provider's specialty.  See \"Patient Info\" tab in inMy Luv My Life My Heartbeatset, or \"Choose Columns\" in " Meds & Orders section of the refill encounter.             Passed - Medication is active on med list       Passed - Patient is age 18 or older       Passed - No active pregancy on record       Passed - Normal serum creatinine on file in past 12 months    Recent Labs   Lab Test 12/18/18 0109   CR 0.74             Passed - Normal serum potassium on file in past 12 months    Recent Labs   Lab Test 12/18/18 0109   POTASSIUM 3.6                   Passed - Normal serum sodium on file in past 12 months    Recent Labs   Lab Test 12/18/18 0109                Passed - No positive pregnancy test in past 12 months

## 2019-01-15 ENCOUNTER — ANTICOAGULATION THERAPY VISIT (OUTPATIENT)
Dept: ANTICOAGULATION | Facility: CLINIC | Age: 54
End: 2019-01-15
Payer: COMMERCIAL

## 2019-01-15 ENCOUNTER — TELEPHONE (OUTPATIENT)
Dept: ANTICOAGULATION | Facility: CLINIC | Age: 54
End: 2019-01-15

## 2019-01-15 DIAGNOSIS — I82.409 DVT (DEEP VENOUS THROMBOSIS) (H): ICD-10-CM

## 2019-01-15 DIAGNOSIS — I63.9 STROKE (H): ICD-10-CM

## 2019-01-15 DIAGNOSIS — Z79.01 LONG TERM CURRENT USE OF ANTICOAGULANT THERAPY: ICD-10-CM

## 2019-01-15 DIAGNOSIS — Z86.73 HISTORY OF STROKE: ICD-10-CM

## 2019-01-15 DIAGNOSIS — I73.9 PVD (PERIPHERAL VASCULAR DISEASE) WITH CLAUDICATION (H): ICD-10-CM

## 2019-01-15 LAB
HCT VFR BLD AUTO: 62.9 % (ref 35–47)
INR PPP: 1.42 (ref 0.86–1.14)

## 2019-01-15 PROCEDURE — 85610 PROTHROMBIN TIME: CPT | Performed by: FAMILY MEDICINE

## 2019-01-15 PROCEDURE — 36415 COLL VENOUS BLD VENIPUNCTURE: CPT | Performed by: FAMILY MEDICINE

## 2019-01-15 PROCEDURE — 99207 ZZC NO CHARGE NURSE ONLY: CPT

## 2019-01-15 PROCEDURE — 85014 HEMATOCRIT: CPT | Performed by: FAMILY MEDICINE

## 2019-01-15 NOTE — TELEPHONE ENCOUNTER
Patient was holding and bridging with Lovenox for her lap jose on 1/4/19. She had a therapeutic INR on Friday and had stopped her injections at that time. [facility]'s usual protocol is to continue Lovenox until there are 2 consecutive days of the INR back into range, however patient declined to do so.         Her INR is now subtherapeutic at 1.42. Would you recommend that she resume the Lovenox injections until her INR is therapeutic or continue with the current plan of increasing the warfarin dose.     Please advise.

## 2019-01-15 NOTE — PROGRESS NOTES
"19 ADDENDUM: Per PCP, patient is to restart the Lovenox injections. Writer ordered another script for patient to  at [facility]. She will start the injections today and recheck her INR at the lab on Friday as originally planned.    Camilo TIWARI RN, CACP     ANTICOAGULATION FOLLOW-UP CLINIC VISIT    Patient Name:  Bia Bill  Date:  1/15/2019  Contact Type:  Telephone    SUBJECTIVE:     Patient Findings     Positives:   Unexplained INR or factor level change    Comments:   It is unclear why patient's INR is low. Her usual warfarin dose is 80mg/week. In the last 7 days she has had 82.5mg. She denies missing any doses. She states she had a \"1/2 caesar salad\" and no other changes in vitamin K intake. She has been taking oxycodone, no other changes in medications. Those 2 things would not cause her INR to decrease to 1.42. Patient was previously on Lovenox bridge, writer sent a telephone note to her PCP asking if she should restart the injections. Patient is aware of the potential to restart the Lovenox. If needed, the script can be sent to [facility] in [place].            OBJECTIVE    INR   Date Value Ref Range Status   01/15/2019 1.42 (H) 0.86 - 1.14 Final     Comment:     Special tube used to correct for high hematocrit     Hematocrit   Date Value Ref Range Status   01/15/2019 62.9 (H) 35.0 - 47.0 % Final      ASSESSMENT / PLAN  INR assessment SUB    Recheck INR In: 3 DAYS    INR Location Outside lab      Anticoagulation Summary  As of 1/15/2019    INR goal:   2.0-2.5   TTR:   49.7 % (3.8 y)   INR used for dosin.42! (1/15/2019)   Warfarin maintenance plan:   10 mg (5 mg x 2) every Mon, Wed, Fri; 12.5 mg (5 mg x 2.5) all other days   Full warfarin instructions:   1/15: 15 mg; : 15 mg; Otherwise 10 mg every Mon, Wed, Fri; 12.5 mg all other days   Weekly warfarin total:   80 mg   Plan last modified:   Angelina Nj RN (2018)   Next INR check:   2019   Priority:   INR   Target end " date:   Indefinite    Indications    PVD (peripheral vascular disease) with claudication (H) [I73.9]  Long term current use of anticoagulant therapy [Z79.01]  DVT (deep venous thrombosis) (H) [I82.409]  Stroke (H) [I63.9]             Anticoagulation Episode Summary     INR check location:       Preferred lab:       Send INR reminders to:   [place] PHONE [facility] POOL    Comments:   * lab draw due to elevated hematocrit (fingerstick meters don't work). LEFT LE. STENT x 3 LEFT UPPER LEG. Previous arterial clot. New goal range 2-2.5 starting 11/6/17.      Anticoagulation Care Providers     Provider Role Specialty Phone number    Kd Lenog MD [facility] [phone]            See the Encounter Report to view Anticoagulation Flowsheet and Dosing Calendar (Go to Encounters tab in chart review, and find the Anticoagulation Therapy Visit)    Patient has 42/50 of her standing INR order remaining.     Angelina Nj RN CACP

## 2019-01-16 DIAGNOSIS — Z79.01 LONG TERM CURRENT USE OF ANTICOAGULANT THERAPY: ICD-10-CM

## 2019-01-16 DIAGNOSIS — I82.409 DVT (DEEP VENOUS THROMBOSIS) (H): ICD-10-CM

## 2019-01-16 NOTE — PROGRESS NOTES
Writer called patient with the updated recommendation from Dr. Leong. She will  the prescription and start the injections today.    Camilo TIWARI RN, CACP

## 2019-01-16 NOTE — TELEPHONE ENCOUNTER
Patient was contacted earlier today, agreed to bridge with Lovenox. 10 syringes were sent to [facility] pharmacy per patient preference.     Camilo TIWARI RN, CACP

## 2019-01-18 ENCOUNTER — ANTICOAGULATION THERAPY VISIT (OUTPATIENT)
Dept: ANTICOAGULATION | Facility: CLINIC | Age: 54
End: 2019-01-18

## 2019-01-18 DIAGNOSIS — I82.409 DVT (DEEP VENOUS THROMBOSIS) (H): ICD-10-CM

## 2019-01-18 DIAGNOSIS — Z86.73 HISTORY OF STROKE: ICD-10-CM

## 2019-01-18 DIAGNOSIS — I63.9 STROKE (H): ICD-10-CM

## 2019-01-18 DIAGNOSIS — Z79.01 LONG TERM CURRENT USE OF ANTICOAGULANT THERAPY: ICD-10-CM

## 2019-01-18 DIAGNOSIS — I73.9 PVD (PERIPHERAL VASCULAR DISEASE) WITH CLAUDICATION (H): ICD-10-CM

## 2019-01-18 LAB
HCT VFR BLD AUTO: 62 % (ref 35–47)
INR PPP: 2.66 (ref 0.86–1.14)

## 2019-01-18 PROCEDURE — 36415 COLL VENOUS BLD VENIPUNCTURE: CPT | Performed by: FAMILY MEDICINE

## 2019-01-18 PROCEDURE — 85014 HEMATOCRIT: CPT | Performed by: FAMILY MEDICINE

## 2019-01-18 PROCEDURE — 85610 PROTHROMBIN TIME: CPT | Performed by: FAMILY MEDICINE

## 2019-01-18 PROCEDURE — 99207 ZZC NO CHARGE NURSE ONLY: CPT

## 2019-01-18 NOTE — PROGRESS NOTES
ANTICOAGULATION FOLLOW-UP CLINIC VISIT    Patient Name:  Bia Bill  Date:  2019  Contact Type:  Telephone    SUBJECTIVE:     Patient Findings     Positives:   Extra doses    Comments:   No changes in medications, activity, health, or diet noted. No bleeding or increased bruising noted. Took warfarin as prescribed.  Patient had 87.5 mg in the last 7 days. Writer adjusted dose so patient will have 85 mg by the next INR.   Patient has 6 Lovenox injections left. Writer advised her to continue with the injections until they are gone. Patient is not available to have her INR rechecked until Thursday, and with her INR dropping recently, writer did not want to discontinue injections.   Recheck the INR in 6 days.  Patient denies signs or symptoms of bleeding. Writer educated patient regarding increased bleed risk and when to seek immediate medical attention. Patient verbalized understanding.               OBJECTIVE    INR   Date Value Ref Range Status   2019 2.66 (H) 0.86 - 1.14 Final     Comment:     Special tube used to correct for high hematocrit       ASSESSMENT / PLAN  INR assessment THER +/-1   Recheck INR In: 6 DAYS    INR Location Outside lab      Anticoagulation Summary  As of 2019    INR goal:   2.0-2.5   TTR:   49.6 % (3.8 y)   INR used for dosin.66! (2019)   Warfarin maintenance plan:   10 mg (5 mg x 2) every Mon, Wed, Fri; 12.5 mg (5 mg x 2.5) all other days   Full warfarin instructions:   : 12.5 mg; : 12.5 mg; Otherwise 10 mg every Mon, Wed, Fri; 12.5 mg all other days   Weekly warfarin total:   80 mg   Plan last modified:   Angelina Nj RN (2018)   Next INR check:   2019   Priority:   INR   Target end date:   Indefinite    Indications    PVD (peripheral vascular disease) with claudication (H) [I73.9]  Long term current use of anticoagulant therapy [Z79.01]  DVT (deep venous thrombosis) (H) [I82.409]  Stroke (H) [I63.9]             Anticoagulation  Episode Summary     INR check location:       Preferred lab:       Send INR reminders to:   [place] PHONE ANTICOAG POOL    Comments:   * lab draw due to elevated hematocrit (fingerstick meters don't work). LEFT LE. STENT x 3 LEFT UPPER LEG. Previous arterial clot. New goal range 2-2.5 starting 11/6/17.      Anticoagulation Care Providers     Provider Role Specialty Phone number    Kd Leong MD [facility] [phone]            See the Encounter Report to view Anticoagulation Flowsheet and Dosing Calendar (Go to Encounters tab in chart review, and find the Anticoagulation Therapy Visit)        Reina Forrest RN

## 2019-01-23 DIAGNOSIS — F33.1 MAJOR DEPRESSIVE DISORDER, RECURRENT EPISODE, MODERATE (H): ICD-10-CM

## 2019-01-23 DIAGNOSIS — G63 POLYNEUROPATHY IN OTHER DISEASES CLASSIFIED ELSEWHERE (H): Chronic | ICD-10-CM

## 2019-01-23 RX ORDER — GABAPENTIN 600 MG/1
TABLET ORAL
Qty: 90 TABLET | Refills: 0 | Status: SHIPPED | OUTPATIENT
Start: 2019-01-23 | End: 2019-02-04

## 2019-01-23 RX ORDER — LAMOTRIGINE 200 MG/1
TABLET ORAL
Qty: 30 TABLET | Refills: 0 | Status: SHIPPED | OUTPATIENT
Start: 2019-01-23 | End: 2019-02-22

## 2019-01-24 ENCOUNTER — ANTICOAGULATION THERAPY VISIT (OUTPATIENT)
Dept: ANTICOAGULATION | Facility: CLINIC | Age: 54
End: 2019-01-24

## 2019-01-24 ENCOUNTER — OFFICE VISIT (OUTPATIENT)
Dept: PSYCHOLOGY | Facility: CLINIC | Age: 54
End: 2019-01-24
Payer: COMMERCIAL

## 2019-01-24 DIAGNOSIS — Z86.73 HISTORY OF STROKE: ICD-10-CM

## 2019-01-24 DIAGNOSIS — I82.409 DVT (DEEP VENOUS THROMBOSIS) (H): ICD-10-CM

## 2019-01-24 DIAGNOSIS — I63.9 STROKE (H): ICD-10-CM

## 2019-01-24 DIAGNOSIS — Z79.01 LONG TERM CURRENT USE OF ANTICOAGULANT THERAPY: ICD-10-CM

## 2019-01-24 DIAGNOSIS — I73.9 PVD (PERIPHERAL VASCULAR DISEASE) WITH CLAUDICATION (H): ICD-10-CM

## 2019-01-24 DIAGNOSIS — F31.62 MODERATE MIXED BIPOLAR I DISORDER (H): Primary | ICD-10-CM

## 2019-01-24 LAB
HCT VFR BLD AUTO: 58.6 % (ref 35–47)
INR PPP: 2.86 (ref 0.86–1.14)

## 2019-01-24 PROCEDURE — 90834 PSYTX W PT 45 MINUTES: CPT | Performed by: PSYCHOLOGIST

## 2019-01-24 PROCEDURE — 36415 COLL VENOUS BLD VENIPUNCTURE: CPT | Performed by: FAMILY MEDICINE

## 2019-01-24 PROCEDURE — 99207 ZZC NO CHARGE NURSE ONLY: CPT

## 2019-01-24 PROCEDURE — 85014 HEMATOCRIT: CPT | Performed by: FAMILY MEDICINE

## 2019-01-24 PROCEDURE — 85610 PROTHROMBIN TIME: CPT | Performed by: FAMILY MEDICINE

## 2019-01-24 NOTE — PROGRESS NOTES
ANTICOAGULATION FOLLOW-UP CLINIC VISIT    Patient Name:  Bia Bill  Date:  2019  Contact Type:  Telephone    SUBJECTIVE:     Patient Findings     Positives:   Extra doses (had been instructed to take 5mg more than her usual dose due to recent low INRs), Bruising (some bruising from Lovenox injections on her abdomen)    Comments:   No changes in medications, diet, or activity noted. Took warfarin as instructed, denies any missed doses. She stopped Lovenox injections last Saturday. She will resume her usual warfarin dose and recheck again in 2 weeks when she is back into the clinic.            OBJECTIVE    INR   Date Value Ref Range Status   2019 2.86 (H) 0.86 - 1.14 Final       ASSESSMENT / PLAN  No question data found.  Anticoagulation Summary  As of 2019    INR goal:   2.0-2.5   TTR:   49.4 % (3.8 y)   INR used for dosin.86! (2019)   Warfarin maintenance plan:   10 mg (5 mg x 2) every Mon, Wed, Fri; 12.5 mg (5 mg x 2.5) all other days   Full warfarin instructions:   10 mg every Mon, Wed, Fri; 12.5 mg all other days   Weekly warfarin total:   80 mg   Plan last modified:   Angelina Nj RN (2018)   Next INR check:   2019   Priority:   INR   Target end date:   Indefinite    Indications    PVD (peripheral vascular disease) with claudication (H) [I73.9]  Long term current use of anticoagulant therapy [Z79.01]  DVT (deep venous thrombosis) (H) [I82.409]  Stroke (H) [I63.9]             Anticoagulation Episode Summary     INR check location:       Preferred lab:       Send INR reminders to:   [place] PHONE [employer]PRINT    Comments:   * lab draw due to elevated hematocrit (fingerstick meters don't work). LEFT LE. STENT x 3 LEFT UPPER LEG. Previous arterial clot. New goal range 2-2.5 starting 17.      Anticoagulation Care Providers     Provider Role Specialty Phone number    Kd Leong MD [facility] [phone]            See the Encounter Report to  view Anticoagulation Flowsheet and Dosing Calendar (Go to Encounters tab in chart review, and find the Anticoagulation Therapy Visit)    Patient has 40/50 of her standing INR order remaining.     Angelina Nj RN [facility]

## 2019-01-25 ASSESSMENT — ANXIETY QUESTIONNAIRES
1. FEELING NERVOUS, ANXIOUS, OR ON EDGE: SEVERAL DAYS
5. BEING SO RESTLESS THAT IT IS HARD TO SIT STILL: SEVERAL DAYS
3. WORRYING TOO MUCH ABOUT DIFFERENT THINGS: SEVERAL DAYS
2. NOT BEING ABLE TO STOP OR CONTROL WORRYING: SEVERAL DAYS
7. FEELING AFRAID AS IF SOMETHING AWFUL MIGHT HAPPEN: NOT AT ALL
4. TROUBLE RELAXING: MORE THAN HALF THE DAYS
GAD7 TOTAL SCORE: 9
6. BECOMING EASILY ANNOYED OR IRRITABLE: NEARLY EVERY DAY

## 2019-01-25 ASSESSMENT — PATIENT HEALTH QUESTIONNAIRE - PHQ9: SUM OF ALL RESPONSES TO PHQ QUESTIONS 1-9: 8

## 2019-01-25 NOTE — PROGRESS NOTES
Progress Note  Disclaimer: This note consists of symbols derived from keyboarding, dictation and/or voice recognition software. As a result, there may be errors in the script that have gone undetected. Please consider this when interpreting information found in this chart.    Client Name: Bia Bill  Date: 1/24/2019         Service Type: Individual      Session Start Time: 10:30 AM session End Time: 11:15 AM    session Length: 45     Session #: 42     Attendees: Client attended alone    Treatment Plan Last Reviewed: 10/19/2018  PHQ-9 / CHIN-7 : See flowsheet     DATA   Client reports she is recovering from her gallbladder surgery.  She is still in some pain and the weather is making this worse and making her more irritable.  Recently visited with 1 of her sisters.  Brought up some uncomfortable memories as her sister would not talk about the present but preferred to talk about the past.   Progress Since Last Session (Related to Symptoms / Goals / Homework):   Symptoms: Stable    Homework: Achieved / completed to satisfaction      Episode of Care Goals: Satisfactory progress - ACTION (Actively working towards change); Intervened by reinforcing change plan / affirming steps taken     Current / Ongoing Stressors and Concerns:   Recurrent depression  family relational problems, medical problems, chronic pain, trauma history      Treatment Objective(s) Addressed in This Session:   Increase interest, engagement, and pleasure in doing things  Decrease frequency and intensity of feeling down, depressed, hopeless       Intervention:   CBT: Behavioral activation supported client's effort at establishing maintaining healthy boundaries with her family.    ASSESSMENT: Current Emotional / Mental Status (status of significant symptoms):   Risk status (Self / Other harm or suicidal ideation)   Client denies current fears or concerns for personal safety.   Client denies current or  recent suicidal ideation or behaviors.   Client denies current or recent homicidal ideation or behaviors.   Client denies current or recent self injurious behavior or ideation.   Client denies other safety concerns.   A safety and risk management plan has not been developed at this time, however client was given the after-hours number / 911 should there be a change in any of these risk factors.     Appearance:   Appropriate    Eye Contact:   Good    Psychomotor Behavior: Normal    Attitude:   Cooperative    Orientation:   All   Speech    Rate / Production: Normal     Volume:  Normal    Mood:    Normal   Affect:    Appropriate    Thought Content:  Clear    Thought Form:  Coherent  Logical    Insight:    Good      Medication Review:   No changes to current psychiatric medication(s)     Medication Compliance:   Yes     Changes in Health Issues:   None reported     Chemical Use Review:   Substance Use: Chemical use reviewed, no active concerns identified      Tobacco Use: Client reports no smoking for the last week     Collateral Reports Completed:   Not Applicable    PLAN: (Client Tasks / Therapist Tasks / Other)  Client to continue With her self-care routine and maintenance of boundaries.   Maintain medication compliance and contact with psychiatry.  Take care of herself physically.  Practice one to 2 grounding techniques each day.   Client to maintain sobriety and contact with supportive sober others.  Kd Morris                                                         ________________________________________________________________________    Treatment Plan    Client's Name: Bia Bill  YOB: 1965    Date: 10/24/2017      DSM5 Diagnoses: (Sustained by DSM5 Criteria Listed Above)  Diagnoses: 296.40 Bipolar I Disorder Current or Most Recent Episode Manic, Unspecified  Psychosocial & Contextual Factors: financial difficulties, chronic pain, roommate issues  WHODAS 2.0 (12 item)                This questionnaire asks about difficulties due to health conditions. Health conditions             include disease or illnesses, other health problems that may be short or long lasting,             injuries, mental health or emotional problems, and problems with alcohol or drugs.                         Think back over the past 30 days and answer these questions, thinking about how much             difficulty you had doing the following activities. For each question, please [place] only             one response.      S1  Standing for long periods such as 30 minutes?  Mild =           2    S2  Taking care of household responsibilities?  Moderate =   3    S3  Learning a new task, for example, learning how to get to a new place?  Mild =           2    S4  How much of a problem do you have joining community activities (for example, festivals, Sikhism or other activities) in the same way as anyone else can?  Moderate =   3    S5  How much have you been emotionally affected by your health problems?  Mild =           2        In the past 30 days, how much difficulty did you have in:    S6  Concentrating on doing something for ten minutes?  Mild =           2    S7  Walking a long distance such as a kilometer (or equivalent)?  Severe =       4    S8  Washing your whole body?  None =         1    S9  Getting dressed?  None =         1    S10  Dealing with people you do not know?  Moderate =   3    S11  Maintaining a friendship?  Severe =       4    S12  Your day to day work?  Moderate =   3       H1  Overall, in the past 30 days, how many days were these difficulties present?  Record number of days seven    H2  In the past 30 days, for how many days were you totally unable to carry out your usual activities or work because of any health condition?  Record number of days  seven    H3  In the past 30 days, not counting the days that you were totally unable, for how many days did you cut back or reduce your usual  activities or work because of any health condition?  Record number of days five                   Referral / Collaboration:  Referral to another professional/service is not indicated at this time..    Anticipated number of session or this episode of care: 15    Goals  1. Education- the Biopsychosocial model of depression  a. Client will be able to describe how depression is effecting them physically, emotionally and socially  2. Behavioral Activation  a. Client will learn to assess their depression on a day to day basis  b. Client will Identify two forms of exercise/activity and engage in them 3 times per week  c. Client will Identify 3 things that make them laugh, and engage in these 5 times per week.  d. Client will Identify 1-2Creative activities or hobbies  and engage in them 2 times per week  e. Client will identify music, movies, books that make them feel good and use them 3-4 times per week  3. Self-care  a. Client will identify 5 things they can do just for themselves  b. Client will take time for quiet, reflection, meditation 5 times per week  c. Client will Learn to set boundaries when appropriate  d. Client will Identify 2 individuals they can call on for support, distraction  4. Assessment of progress  a. Client will engage in assessment of their progress on a regular basis    Bipolar Disorder  Treatment plan:  1. Education- the Biopsychosocial model of Bipolar Disorder    a. Client will be able to describe in general terms what Bipolar Disorder  is and is not  b. Client will be able to describe how Bipolar Disorder  has affected their life in at least two different areas, such as school or work and Home/relationships  c. Clients parents/guardians or significant others will be provided information on what Bipolar Disorder  is and the ways it can affect relationships and be encouraged to be a part of clients treatment team.  2. Medication  a. Client will participate in medication evaluation for Bipolar  Disorder  symptoms and follow medication recommendations.   3. Identification and management of triggers  a. Client and therapist will examine patient s history to determine if there are predictable triggers for manic or depressive episodes (e.g. boredom, anger, family stress)  b. Client and therapist will develop means of diffusing these triggers (e.g. relaxation strategies, boundary setting, anger management)  4. Comorbid conditions   a. Client and therapist will asses for comorbid conditions (e.g. anxiety, depression, substance use). and add additional items to treatment plan as needed  5. Self-care  a. Client will identify 3 things they can do just for themselves  b. Client will take time for quiet, reflection, meditation 3 times per week  c. Client will Learn to set boundaries when appropriate  d. Client will Identify 2 individuals they can call on for support, distraction  5. Behavioral Activation  a. Client will Identify two forms of exercise/activity and engage in them 3 times per week  b. Client will Identify 2 things that make them laugh, and engage in these 3 times per week.  c. Client will Identify 2 Creative activities or hobbies  and engage in them 2 times per week  d. Client will identify music, movies, books that make them feel good and use them 3 times per week  6. Assessment of progress  a. Client will engage in assessment of their progress on a regular basis    Client has reviewed and agreed to the above plan.      Kd Morris  4/3/2018

## 2019-01-26 ASSESSMENT — ANXIETY QUESTIONNAIRES: GAD7 TOTAL SCORE: 9

## 2019-02-01 DIAGNOSIS — E03.9 HYPOTHYROIDISM, UNSPECIFIED TYPE: ICD-10-CM

## 2019-02-03 RX ORDER — LEVOTHYROXINE SODIUM 150 UG/1
150 TABLET ORAL DAILY
Qty: 90 TABLET | Refills: 2 | Status: SHIPPED | OUTPATIENT
Start: 2019-02-03 | End: 2019-11-22

## 2019-02-04 DIAGNOSIS — G63 POLYNEUROPATHY IN OTHER DISEASES CLASSIFIED ELSEWHERE (H): Chronic | ICD-10-CM

## 2019-02-06 DIAGNOSIS — I82.409 DVT (DEEP VENOUS THROMBOSIS) (H): ICD-10-CM

## 2019-02-06 DIAGNOSIS — Z86.73 HISTORY OF STROKE: ICD-10-CM

## 2019-02-06 DIAGNOSIS — Z79.01 LONG TERM CURRENT USE OF ANTICOAGULANT THERAPY: ICD-10-CM

## 2019-02-06 LAB
HCT VFR BLD AUTO: 63 % (ref 35–47)
INR PPP: 3.51 (ref 0.86–1.14)

## 2019-02-06 PROCEDURE — 85014 HEMATOCRIT: CPT | Performed by: FAMILY MEDICINE

## 2019-02-06 PROCEDURE — 85610 PROTHROMBIN TIME: CPT | Performed by: FAMILY MEDICINE

## 2019-02-06 PROCEDURE — 36415 COLL VENOUS BLD VENIPUNCTURE: CPT | Performed by: FAMILY MEDICINE

## 2019-02-06 RX ORDER — GABAPENTIN 600 MG/1
600 TABLET ORAL 3 TIMES DAILY
Qty: 90 TABLET | Refills: 0 | Status: SHIPPED | OUTPATIENT
Start: 2019-02-06 | End: 2019-02-22

## 2019-02-07 ENCOUNTER — ANTICOAGULATION THERAPY VISIT (OUTPATIENT)
Dept: ANTICOAGULATION | Facility: CLINIC | Age: 54
End: 2019-02-07
Payer: COMMERCIAL

## 2019-02-07 DIAGNOSIS — Z79.01 LONG TERM CURRENT USE OF ANTICOAGULANT THERAPY: ICD-10-CM

## 2019-02-07 DIAGNOSIS — I73.9 PVD (PERIPHERAL VASCULAR DISEASE) WITH CLAUDICATION (H): ICD-10-CM

## 2019-02-07 PROCEDURE — 99207 ZZC NO CHARGE NURSE ONLY: CPT

## 2019-02-07 NOTE — PROGRESS NOTES
ANTICOAGULATION FOLLOW-UP CLINIC VISIT    Patient Name:  Bia Bill  Date:  2/7/2019  Contact Type:  Telephone/ writer spoke with Norah on the phone    SUBJECTIVE:     Patient Findings     Positives:   Change in diet/appetite (not eating well since surgery, mostly soup), Inflammation    Comments:   Patient had her gallbladder removed on 1-4-19. She reports some swelling still from that surgery. No current signs of infection. She is not eating well and writer suspects low albumin levels may be contributing to elevated INR. The patient has no signs of bleeding and only old bruising from bridging with lovenox (more than 2 weeks ago). She is still taking narcotics for pain. No other changes or concerns. Will have pt hold her warfarin today, work on increasing protein in her diet and recheck INR sometime next week at lab when able.           OBJECTIVE    INR   Date Value Ref Range Status   02/06/2019 3.51 (H) 0.86 - 1.14 Final     Comment:     Special tube used to correct for high hematocrit       ASSESSMENT / PLAN  INR assessment SUPRA    Recheck INR In: 1 WEEK    INR Location Clinic lab     Anticoagulation Summary  As of 2/7/2019    INR goal:   2.0-2.5   TTR:   49.0 % (3.8 y)   INR used for dosing:   3.51! (2/6/2019)   Warfarin maintenance plan:   10 mg (5 mg x 2) every Mon, Wed, Fri; 12.5 mg (5 mg x 2.5) all other days   Full warfarin instructions:   2/7: Hold; Otherwise 10 mg every Mon, Wed, Fri; 12.5 mg all other days   Weekly warfarin total:   80 mg   Plan last modified:   Angelina Nj RN (7/12/2018)   Next INR check:   2/15/2019   Priority:   INR   Target end date:   Indefinite    Indications    PVD (peripheral vascular disease) with claudication (H) [I73.9]  Long term current use of anticoagulant therapy [Z79.01]  DVT (deep venous thrombosis) (H) [I82.409]  Stroke (H) [I63.9]             Anticoagulation Episode Summary     INR check location:       Preferred lab:       Send [place]R reminders to:   [place]  PHONE [facility]    Comments:   * lab draw due to elevated hematocrit (fingerstick meters don't work). LEFT LE. STENT x 3 LEFT UPPER LEG. Previous arterial clot. New goal range 2-2.5 starting 11/6/17.      Anticoagulation Care Providers     Provider Role Specialty Phone number    Kd Leong MD [facility] [phone]            See the Encounter Report to view Anticoagulation Flowsheet and Dosing Calendar (Go to Encounters tab in chart review, and find the Anticoagulation Therapy Visit)        Mireille Torres RN

## 2019-02-12 NOTE — MR AVS SNAPSHOT
MRN:[id number]                      After Visit Summary   12/12/2017    Bia Bill    MRN: [id number]           Visit Information        Provider Department      12/12/2017 10:30 AM Kd Morris [facility] Generic      Your next 10 appointments already scheduled     Dec 15, 2017  1:30 PM CST   LAB with [facility] ([facility])    [address] [address]   [phone]           Please do not eat 10-12 hours before your appointment if you are coming in fasting for labs on lipids, cholesterol, or glucose (sugar). This does not apply to pregnant women. Water, hot tea and black coffee (with nothing added) are okay. Do not drink other fluids, diet soda or chew gum.            Dec 15, 2017  1:35 PM CST   LAB with [facility] ([facility])    [address] [address]   [phone]           Please do not eat 10-12 hours before your appointment if you are coming in fasting for labs on lipids, cholesterol, or glucose (sugar). This does not apply to pregnant women. Water, hot tea and black coffee (with nothing added) are okay. Do not drink other fluids, diet soda or chew gum.            Dec 27, 2017 10:30 AM CST   Return Visit with Kd Morris   [facility] ([facility])    [address] [address]   [phone]            Jan 09, 2018 10:30 AM CST   Return Visit with Kd Morris   [facility] ([facility])    [address] [address]   [phone]            Jan 12, 2018  1:30 PM CST   LAB with [facility] ([facility])    [address] [address]   [phone]           Please do not eat 10-12 hours before your appointment if you are coming in fasting for labs on lipids, cholesterol, or glucose  (sugar). This does not apply to pregnant women. Water, hot tea and black coffee (with nothing added) are okay. Do not drink other fluids, diet soda or chew gum.            Feb 09, 2018  1:30 PM CST   LAB with [facility] ([facility])    [address] [address]   [phone]           Please do not eat 10-12 hours before your appointment if you are coming in fasting for labs on lipids, cholesterol, or glucose (sugar). This does not apply to pregnant women. Water, hot tea and black coffee (with nothing added) are okay. Do not drink other fluids, diet soda or chew gum.            Mar 16, 2018  1:30 PM CDT   LAB with [facility] ([facility])    [address] [address]   [phone]           Please do not eat 10-12 hours before your appointment if you are coming in fasting for labs on lipids, cholesterol, or glucose (sugar). This does not apply to pregnant women. Water, hot tea and black coffee (with nothing added) are okay. Do not drink other fluids, diet soda or chew gum.            Mar 16, 2018  1:35 PM CDT   LAB with [facility] ([facility])    [address] [address]   [phone]           Please do not eat 10-12 hours before your appointment if you are coming in fasting for labs on lipids, cholesterol, or glucose (sugar). This does not apply to pregnant women. Water, hot tea and black coffee (with nothing added) are okay. Do not drink other fluids, diet soda or chew gum.            Mar 23, 2018  1:00 PM CDT   Return Visit with Joshua Watts MD   [facility] ([facility])    [facility]  [address]  [place] [address]   [phone]            Apr 06, 2018  1:30 PM CDT   LAB with [facility] ([facility])    [address]  [facility] [address]   [phone]           Please do not eat 10-12 hours before your appointment if you are coming in fasting for labs on lipids, cholesterol, or glucose (sugar). This does not apply to pregnant women. Water, hot tea and black coffee (with nothing added) are okay. Do not drink other fluids, diet soda or chew gum.              [employer] Information     [employer] lets you send messages to your doctor, view your test results, renew your prescriptions, schedule appointments and more. To sign up, go to [email or web address] . Click on \"Log in\" on the left side of the screen, which will take you to the Welcome page. Then click on \"Sign up Now\" on the right side of the page.     You will be asked to enter the access code listed below, as well as some personal information. Please follow the directions to create your username and password.     Your access code is: [email or web address]  Expires: 2018 11:08 PM     Your access code will  in 90 days. If you need help or a new code, please call your [place] clinic or [phone].        Care EveryWhere ID     This is your Care EveryWhere ID. This could be used by other organizations to access your [place] medical records  [id number]        Equal Access to Services     [facility] SUREKHA : Hadii jose Clark, waaxda luqadaha, qaybta kaalmada adeuzair, nitin craig. So [facility] [phone].    ATENCIÓN: Si habla español, tiene a terry disposición servicios gratuitos de asistencia lingüística. Llame al [phone].    We comply with applicable federal civil rights laws and [place] laws. We do not discriminate on the basis of race, color, national origin, age, disability, sex, sexual orientation, or gender identity.            " Right Axillary Vein DVT - seen by vascular surgery. Started on Eliquis 10 mg BID x 7 days then 5 mg BID to complete of 3 months of AC. Keep arm elevated. Once PICC is removed (after completion of antibiotics), can loosely wrap arm for gentle compression to prevent worsening edema.

## 2019-02-14 ENCOUNTER — PATIENT OUTREACH (OUTPATIENT)
Dept: CARE COORDINATION | Facility: CLINIC | Age: 54
End: 2019-02-14

## 2019-02-14 ASSESSMENT — ACTIVITIES OF DAILY LIVING (ADL)
DEPENDENT_IADLS:: INDEPENDENT
DEPENDENT_IADLS:: INDEPENDENT

## 2019-02-14 NOTE — LETTER
[place] CARE COORDINATION  [facility]  [address] [place] [address]  [phone]    February 14, 2019    Bia Bill  [address]  [place] [address]      Dear Bia,    I am a clinic care coordinator who works with Kd Leong MD at [facility]. I wanted to thank you for spending the time to talk with me.  I wanted to introduce myself and provide you with my contact information so that you can call me with questions or concerns about your health care. Below is a description of clinic care coordination and how I can further assist you.     The clinic care coordinator is a registered nurse and/or  who understand the health care system. The goal of clinic care coordination is to help you manage your health and improve access to the [facility] system in the most efficient manner. The registered nurse can assist you in meeting your health care goals by providing education, coordinating services, and strengthening the communication among your providers. The  can assist you with financial, behavioral, psychosocial, chemical dependency, counseling, and/or psychiatric resources.    Please feel free to contact me at [phone], [phone], with any questions or concerns. We at [facility] are focused on providing you with the highest-quality healthcare experience possible and that all starts with you.     Sincerely,     Wei Gray RN  Clinic Care Coordinator    Enclosed: I have enclosed a copy of the Complex Care Plan. This has helpful information and goals that we have talked about. Please keep this in an easy to access place to use as needed.

## 2019-02-14 NOTE — PROGRESS NOTES
Clinic Care Coordination Contact  [facility]/Voicemail    Referral Source: ED Follow-Up  Clinical Data: Care Coordinator Outreach  Outreach attempted x 1.  Left message on voicemail with call back information and requested return call.  Plan: Care Coordinator mailed out care coordination introduction letter on 12-18. Care Coordinator will try to reach patient again in 3-5 business days.  Wei Gray RN  Clinic Care Coordinator  [phone] or [phone]

## 2019-02-14 NOTE — LETTER
[facility] Home  Complex Care Plan  About Me  Patient Name:  Bia Bill    YOB: 1965  Age:     53 year old   [facility] MRN:   [id number] Telephone Information:  Home Phone [phone]   Mobile [phone]       Address:    [address]  [place] [address] Email address:  No e-mail address on record      Emergency Contact(s)  Name Relationship Lgl Grd Work Phone Home Phone Mobile Phone   1. GREGORY CARLSON Significant ot*  none [phone]   2. MAE MUSTAFA Daughter  none [phone] none           Primary language:  English     needed? No   [facility] [employer]:  [phone] op. 1  Other communication barriers: None  Preferred Method of Communication:  Mail  Current living arrangement: I live in a private home with family  Mobility Status/ Medical Equipment: Independent    Health Maintenance  Health Maintenance Reviewed:   Health Maintenance Due   Topic Date Due     ADVANCE DIRECTIVE PLANNING Q5 YRS  12/03/1984     OP ANNUAL INR REFERRAL  12/20/2012     ZOSTER IMMUNIZATION (1 of 2) 12/03/2016     ASTHMA CONTROL TEST Q6 MOS  11/02/2018       My Access Plan  Medical Emergency 911   Primary Clinic Line [facility] - [phone]   24 Hour Appointment Line [phone] or  [phone]) (toll-free)   24 Hour Nurse Line [phone] (toll-free)   Preferred Urgent Care [facility], [phone]   Preferred Hospital [place], [place]  [phone]   Preferred Pharmacy [place], [place] - [address]     Behavioral Health Crisis Line The National Suicide Prevention Lifeline at 1-984.770.4651 or 911     My Care Team Members    Patient Care Team       Relationship Specialty Notifications Start End    Kd Leong MD PCP - General Family Practice  5/26/16     Phone: [phone] Fax: [phone] [address]  [address]    Kd Leong MD PCP - Assigned PCP   1/1/17     Phone: [phone] Fax: [phone] [address] [facility] [address]    Karl Coto    8/19/16     [facility]     Phone: [phone]         Patel Gray, RN Lead Care Coordinator Primary Care -   12/18/18     Phone: [phone] Fax: [phone]         [address] [place] [address]            My Care Plans  Self Management and Treatment Plan  Goals and (Comments)  Goals        General    Medical (pt-stated)     Notes - Note created  2/14/2019  3:28 PM by Patel Gray, RN    Goal Statement: I will monitor my surgical wound for s/s of infection   Measure of Success: Patient will fully heal from surgery and follow up with providers as needed  Supportive Steps to Achieve: follow up as directed  Barriers: non-identified  Strengths: motivated patient   Date to Achieve By: 3-30-19  Patient expressed understanding of goal: Patient verbalized understanding.                  Action Plans on File:   Asthma        Depression          Advance Care Plans/Directives Type:        My Medical and Care Information  Problem List   Patient Active Problem List   Diagnosis     Mild persistent asthma     Polyneuropathy in other diseases classified elsewhere (H)     DVT (deep venous thrombosis) (H)     Alcohol abuse, in remission     SECONDARY POLYCYTHEMIA     Chondromalacia of patella     Sensorineural hearing loss, asymmetrical     Developmental reading disorder     Esophageal reflux     Hypothyroidism     Fatty liver     GILES (Obstructive Sleep Apnea)-Moderate (AHI 16)     RLS (restless legs syndrome)     Smoker     Erythrocytosis     Moderate mixed bipolar I disorder (H)     Personality disorder, depressive     COPD (chronic obstructive pulmonary disease) (H)     Current use of long term anticoagulation     Rosacea     Health Care Home     DDD (degenerative disc disease), cervical     Benign neoplasm of colon  (POLYPOSIS)     Stroke (H)     Somatization disorder     Sebaceous cyst of right axilla     HTN, goal below 140/90     Left leg pain     Vitamin D deficiency     Long term current use of anticoagulant therapy     Morbid obesity (H)     PVD (peripheral vascular disease) with claudication (H)     Cyst of left ovary     Morbid obesity with alveolar hypoventilation (H)     GI bleed     Severe episode of recurrent major depressive disorder (H)     Conductive hearing loss of left ear with restricted hearing of right ear     Sensorineural hearing loss (SNHL) of right ear with restricted hearing of left ear     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    Care Coordination Start Date: 2/14/2019   Frequency of Care Coordination: monthly   Form Last Updated: 02/14/2019

## 2019-02-14 NOTE — PROGRESS NOTES
"Clinic Care Coordination Contact    Clinic Care Coordination Contact  OUTREACH    Referral Information:  Referral Source: ED Follow-Up    Primary Diagnosis: GI Disorders    Chief Complaint   Patient presents with     Clinic Care Coordination - Follow-up     Care Team        [place] Utilization:   Clinic Utilization  Difficulty keeping appointments:: No  Compliance Concerns: No  No-Show Concerns: No  No PCP office visit in Past Year: No  Utilization    Last refreshed: 2/12/2019 12:54 PM:  Hospital Admissions 0           Last refreshed: 2/12/2019 12:54 PM:  ED Visits 4           Last refreshed: 2/12/2019 12:54 PM:  No Show Count (past year) 3              Current as of: 2/12/2019 12:54 PM              Clinical Concerns:  Current Medical Concerns:  Patient reports that surgical area it  with certain movements. Remaining stiches were \"removed\" by clothing and resulting spot is slowly scabbing over. No s/s of infection reported. Patient verbalized understanding of when to report s/s of infection. No other concerns reported at this time    Current Behavioral Concerns: none    Education Provided to patient: Encouraged follow up appointment with PCP.     Pain  Pain (GOAL):: Yes  Type: Acute (<3mo)  Location of chronic pain:: abdomen  Radiating: No  Progression: Unchanged  Description of pain: Aching  Chronic pain severity:: 5  Limitation of routine activities due to chronic pain:: Yes  Description: Able to do most things most days with some rest  Alleviating Factors: Rest, Pain Medication  Aggravating Factors: Activity, Positioning  Health Maintenance Reviewed:   Health Maintenance Due   Topic Date Due     ADVANCE DIRECTIVE PLANNING Q5 YRS  12/03/1984     OP ANNUAL INR REFERRAL  12/20/2012     ZOSTER IMMUNIZATION (1 of 2) 12/03/2016     ASTHMA CONTROL TEST Q6 MOS  11/02/2018         Medication Management:  Patient is knowledgeable on medications and is adherent. No financial concerns reported at this time.    "     Functional Status:  Dependent ADLs:: Independent  Dependent IADLs:: Independent  Bed or wheelchair confined:: No  Mobility Status: Independent  Fallen 2 or more times in the past year?: No  Any fall with injury in the past year?: No    Living Situation:  Current living arrangement:: I live in a private home with family  Type of residence:: Private home - stairs    Diet/Exercise/Sleep:  Diet:: Regular  Inadequate nutrition (GOAL):: No  Food Insecurity: No  Tube Feeding: No  Exercise:: Currently not exercising    Transportation:  Transportation concerns (GOAL):: No  Transportation means:: Regular car     Psychosocial:  Faith or spiritual beliefs that impact treatment:: No  Mental health DX:: No  Mental health management concern (GOAL):: No  Informal Support system:: Family, Friends     Financial/Insurance:   Financial/Insurance concerns (GOAL):: No       Resources and Interventions:  Current Resources:    ;   Community Resources: None  Supplies used at home:: None  Equipment Currently Used at Home: none         Referrals Placed: None     Goals:   Goals        General    Medical (pt-stated)     Notes - Note created  2/14/2019  3:28 PM by Patel Gray RN    Goal Statement: I will monitor my surgical wound for s/s of infection   Measure of Success: Patient will fully heal from surgery and follow up with providers as needed  Supportive Steps to Achieve: follow up as directed  Barriers: non-identified  Strengths: motivated patient   Date to Achieve By: 3-30-19  Patient expressed understanding of goal: Patient verbalized understanding.                   Patient/Caregiver understanding: Patient verbalized understanding.         Outreach Frequency: monthly  Future Appointments              Tomorrow [facility]    In 1 week Kd Morris [facility]    In 1 month [facility]    In 1 month Joshua Watts MD  [facility]          Plan: 1) Patient will continue to follow treatment plan as directed and follow up with PCP with concerns ongoing.   2) Care Coordination to remain available for future needs. Follow up planned for next month unless otherwise notified.     Wei Gray RN  Clinic Care Coordinator  [phone] or [phone]

## 2019-02-15 ENCOUNTER — ANTICOAGULATION THERAPY VISIT (OUTPATIENT)
Dept: ANTICOAGULATION | Facility: CLINIC | Age: 54
End: 2019-02-15

## 2019-02-15 DIAGNOSIS — Z79.01 LONG TERM CURRENT USE OF ANTICOAGULANT THERAPY: ICD-10-CM

## 2019-02-15 DIAGNOSIS — I82.409 DVT (DEEP VENOUS THROMBOSIS) (H): ICD-10-CM

## 2019-02-15 DIAGNOSIS — Z86.73 HISTORY OF STROKE: ICD-10-CM

## 2019-02-15 DIAGNOSIS — I73.9 PVD (PERIPHERAL VASCULAR DISEASE) WITH CLAUDICATION (H): ICD-10-CM

## 2019-02-15 LAB
HCT VFR BLD AUTO: 61.3 % (ref 35–47)
INR PPP: 3.58 (ref 0.86–1.14)

## 2019-02-15 PROCEDURE — 36415 COLL VENOUS BLD VENIPUNCTURE: CPT | Performed by: FAMILY MEDICINE

## 2019-02-15 PROCEDURE — 99207 ZZC NO CHARGE NURSE ONLY: CPT

## 2019-02-15 PROCEDURE — 85610 PROTHROMBIN TIME: CPT | Performed by: FAMILY MEDICINE

## 2019-02-15 PROCEDURE — 85014 HEMATOCRIT: CPT | Performed by: FAMILY MEDICINE

## 2019-02-15 NOTE — PROGRESS NOTES
ANTICOAGULATION FOLLOW-UP CLINIC VISIT    Patient Name:  Bia Bill  Date:  2/15/2019  Contact Type:  Telephone/ spoke with Norah on the phone    SUBJECTIVE:     Patient Findings     Positives:   Change in diet/appetite, Other complaints (respiratory illness), Intentional hold of therapy (2-7-19)    Comments:   Patient is still not eating well following her recent gallbladder surgery. She also came down with a respiratory illness in the last week. She has congestion, cough and body aches. No fever or chills. She does report coughing up yellow mucus. Writer suggested she see her PCP if this does not resolve soon.     INR remains elevated despite a held dose last week. Will hold today, then resume at 10 mg daily. Recheck in one week at lab. Patient denies any bleeding but does have some bruising. She was advised to go to ED/UC if she develops any symptoms of bleeding including: Nose bleed or cut that does not stop bleeding in 10 minutes, bleeding of the gums, vomiting (will look like coffee grounds) or coughing up blood, unusual, easy or large areas of bruising, increased or unexpected vaginal bleeding or increased menstrual flow, red or black stools, red or orange urine, prolonged or severe headache, pale skin, unusual or constant tiredness.                    OBJECTIVE    INR   Date Value Ref Range Status   02/15/2019 3.58 (H) 0.86 - 1.14 Final     Comment:     Special tube used to correct for high hematocrit       ASSESSMENT / PLAN  INR assessment SUPRA    Recheck INR In: 1 WEEK    INR Location Clinic lab     Anticoagulation Summary  As of 2/15/2019    INR goal:   2.0-2.5   TTR:   48.6 % (3.9 y)   INR used for dosing:   3.58! (2/15/2019)   Warfarin maintenance plan:   10 mg (5 mg x 2) every Mon, Wed, Fri; 12.5 mg (5 mg x 2.5) all other days   Full warfarin instructions:   2/15: Hold; 2/16: 10 mg; 2/17: 10 mg; 2/19: 10 mg; 2/21: 10 mg; Otherwise 10 mg every Mon, Wed, Fri; 12.5 mg all other days   Weekly  warfarin total:   80 mg   Plan last modified:   Angelina Nj RN (7/12/2018)   Next INR check:   2/22/2019   Priority:   INR   Target end date:   Indefinite    Indications    PVD (peripheral vascular disease) with claudication (H) [I73.9]  Long term current use of anticoagulant therapy [Z79.01]  DVT (deep venous thrombosis) (H) [I82.409]  Stroke (H) [I63.9]             Anticoagulation Episode Summary     INR check location:       Preferred lab:       Send INR reminders to:   [place] TATIANA 5app POOL    Comments:   * lab draw due to elevated hematocrit (fingerstick meters don't work). LEFT LE. STENT x 3 LEFT UPPER LEG. Previous arterial clot. New goal range 2-2.5 starting 11/6/17.      Anticoagulation Care Providers     Provider Role Specialty Phone number    Kd Leong MD [facility] [phone]            See the Encounter Report to view Anticoagulation Flowsheet and Dosing Calendar (Go to Encounters tab in chart review, and find the Anticoagulation Therapy Visit)        Mireille Torres RN

## 2019-02-22 ENCOUNTER — OFFICE VISIT (OUTPATIENT)
Dept: PSYCHOLOGY | Facility: CLINIC | Age: 54
End: 2019-02-22
Payer: COMMERCIAL

## 2019-02-22 ENCOUNTER — ANTICOAGULATION THERAPY VISIT (OUTPATIENT)
Dept: ANTICOAGULATION | Facility: CLINIC | Age: 54
End: 2019-02-22

## 2019-02-22 DIAGNOSIS — I63.9 STROKE (H): ICD-10-CM

## 2019-02-22 DIAGNOSIS — Z79.01 LONG TERM CURRENT USE OF ANTICOAGULANT THERAPY: ICD-10-CM

## 2019-02-22 DIAGNOSIS — G63 POLYNEUROPATHY IN OTHER DISEASES CLASSIFIED ELSEWHERE (H): Chronic | ICD-10-CM

## 2019-02-22 DIAGNOSIS — Z86.73 HISTORY OF STROKE: ICD-10-CM

## 2019-02-22 DIAGNOSIS — I73.9 PVD (PERIPHERAL VASCULAR DISEASE) WITH CLAUDICATION (H): ICD-10-CM

## 2019-02-22 DIAGNOSIS — I82.409 DVT (DEEP VENOUS THROMBOSIS) (H): ICD-10-CM

## 2019-02-22 DIAGNOSIS — F31.62 MODERATE MIXED BIPOLAR I DISORDER (H): Primary | ICD-10-CM

## 2019-02-22 DIAGNOSIS — F33.1 MAJOR DEPRESSIVE DISORDER, RECURRENT EPISODE, MODERATE (H): ICD-10-CM

## 2019-02-22 LAB
HCT VFR BLD AUTO: 61.6 % (ref 35–47)
INR PPP: 2.91 (ref 0.86–1.14)

## 2019-02-22 PROCEDURE — 36415 COLL VENOUS BLD VENIPUNCTURE: CPT | Performed by: FAMILY MEDICINE

## 2019-02-22 PROCEDURE — 99207 ZZC NO CHARGE NURSE ONLY: CPT

## 2019-02-22 PROCEDURE — 90834 PSYTX W PT 45 MINUTES: CPT | Performed by: PSYCHOLOGIST

## 2019-02-22 PROCEDURE — 85610 PROTHROMBIN TIME: CPT | Performed by: FAMILY MEDICINE

## 2019-02-22 PROCEDURE — 85014 HEMATOCRIT: CPT | Performed by: FAMILY MEDICINE

## 2019-02-22 RX ORDER — GABAPENTIN 600 MG/1
600 TABLET ORAL 3 TIMES DAILY
Qty: 90 TABLET | Refills: 0 | Status: SHIPPED | OUTPATIENT
Start: 2019-02-22 | End: 2019-03-22

## 2019-02-22 RX ORDER — LAMOTRIGINE 200 MG/1
200 TABLET ORAL DAILY
Qty: 90 TABLET | Refills: 0 | Status: SHIPPED | OUTPATIENT
Start: 2019-02-22 | End: 2019-05-20

## 2019-02-22 NOTE — PROGRESS NOTES
Progress Note  Disclaimer: This note consists of symbols derived from keyboarding, dictation and/or voice recognition software. As a result, there may be errors in the script that have gone undetected. Please consider this when interpreting information found in this chart.    Client Name: Bia Bill  Date: 2/22/18         Service Type: Individual      Session Start Time: 1:00pm session End Time: 1:45 pm  session Length: 45     Session #: 43     Attendees: Client attended alone    Treatment Plan Last Reviewed: 10/19/2018  PHQ-9 / CHIN-7 : See flowsheet     DATA  Client reports disagreement with her daughter over daughters enabling bevavior towards her boyfriend. Notes some more intrusive memories of childhood trauma.    Progress Since Last Session (Related to Symptoms / Goals / Homework):   Symptoms: Stable    Homework: Achieved / completed to satisfaction      Episode of Care Goals: Satisfactory progress - ACTION (Actively working towards change); Intervened by reinforcing change plan / affirming steps taken     Current / Ongoing Stressors and Concerns:   Recurrent depression  family relational problems, medical problems, chronic pain, trauma history      Treatment Objective(s) Addressed in This Session:   Increase interest, engagement, and pleasure in doing things  Decrease frequency and intensity of feeling down, depressed, hopeless       Intervention:   CBT: Behavioral activation supported client's effort at establishing maintaining healthy boundaries with her family.    ASSESSMENT: Current Emotional / Mental Status (status of significant symptoms):   Risk status (Self / Other harm or suicidal ideation)   Client denies current fears or concerns for personal safety.   Client denies current or recent suicidal ideation or behaviors.   Client denies current or recent homicidal ideation or behaviors.   Client denies current or recent self injurious behavior or  ideation.   Client denies other safety concerns.   A safety and risk management plan has not been developed at this time, however client was given the after-hours number / 911 should there be a change in any of these risk factors.     Appearance:   Appropriate    Eye Contact:   Good    Psychomotor Behavior: Normal    Attitude:   Cooperative    Orientation:   All   Speech    Rate / Production: Normal     Volume:  Normal    Mood:    Normal   Affect:    Appropriate    Thought Content:  Clear    Thought Form:  Coherent  Logical    Insight:    Good      Medication Review:   No changes to current psychiatric medication(s)     Medication Compliance:   Yes     Changes in Health Issues:   None reported     Chemical Use Review:   Substance Use: Chemical use reviewed, no active concerns identified      Tobacco Use: Client reports significant increase in smoking dt stress.     Collateral Reports Completed:   Not Applicable    PLAN: (Client Tasks / Therapist Tasks / Other)  Client to continue With her self-care routine and maintenance of boundaries.    Practice one to 2 grounding techniques each day.   Client to maintain sobriety and contact with supportive sober others.  Kd Morris                                                         ________________________________________________________________________    Treatment Plan    Client's Name: Bia Bill  YOB: 1965    Date: 10/24/2017      DSM5 Diagnoses: (Sustained by DSM5 Criteria Listed Above)  Diagnoses: 296.40 Bipolar I Disorder Current or Most Recent Episode Manic, Unspecified  Psychosocial & Contextual Factors: financial difficulties, chronic pain, roommate issues  WHODAS 2.0 (12 item)               This questionnaire asks about difficulties due to health conditions. Health conditions             include disease or illnesses, other health problems that may be short or long lasting,             injuries, mental health or emotional problems, and  problems with alcohol or drugs.                         Think back over the past 30 days and answer these questions, thinking about how much             difficulty you had doing the following activities. For each question, please [place] only             one response.      S1  Standing for long periods such as 30 minutes?  Mild =           2    S2  Taking care of household responsibilities?  Moderate =   3    S3  Learning a new task, for example, learning how to get to a new place?  Mild =           2    S4  How much of a problem do you have joining community activities (for example, festivals, Jain or other activities) in the same way as anyone else can?  Moderate =   3    S5  How much have you been emotionally affected by your health problems?  Mild =           2        In the past 30 days, how much difficulty did you have in:    S6  Concentrating on doing something for ten minutes?  Mild =           2    S7  Walking a long distance such as a kilometer (or equivalent)?  Severe =       4    S8  Washing your whole body?  None =         1    S9  Getting dressed?  None =         1    S10  Dealing with people you do not know?  Moderate =   3    S11  Maintaining a friendship?  Severe =       4    S12  Your day to day work?  Moderate =   3       H1  Overall, in the past 30 days, how many days were these difficulties present?  Record number of days seven    H2  In the past 30 days, for how many days were you totally unable to carry out your usual activities or work because of any health condition?  Record number of days  seven    H3  In the past 30 days, not counting the days that you were totally unable, for how many days did you cut back or reduce your usual activities or work because of any health condition?  Record number of days five                   Referral / Collaboration:  Referral to another professional/service is not indicated at this time..    Anticipated number of session or this episode of care:  15    Goals  1. Education- the Biopsychosocial model of depression  a. Client will be able to describe how depression is effecting them physically, emotionally and socially  2. Behavioral Activation  a. Client will learn to assess their depression on a day to day basis  b. Client will Identify two forms of exercise/activity and engage in them 3 times per week  c. Client will Identify 3 things that make them laugh, and engage in these 5 times per week.  d. Client will Identify 1-2Creative activities or hobbies  and engage in them 2 times per week  e. Client will identify music, movies, books that make them feel good and use them 3-4 times per week  3. Self-care  a. Client will identify 5 things they can do just for themselves  b. Client will take time for quiet, reflection, meditation 5 times per week  c. Client will Learn to set boundaries when appropriate  d. Client will Identify 2 individuals they can call on for support, distraction  4. Assessment of progress  a. Client will engage in assessment of their progress on a regular basis    Bipolar Disorder  Treatment plan:  1. Education- the Biopsychosocial model of Bipolar Disorder    a. Client will be able to describe in general terms what Bipolar Disorder  is and is not  b. Client will be able to describe how Bipolar Disorder  has affected their life in at least two different areas, such as school or work and Home/relationships  c. Clients parents/guardians or significant others will be provided information on what Bipolar Disorder  is and the ways it can affect relationships and be encouraged to be a part of clients treatment team.  2. Medication  a. Client will participate in medication evaluation for Bipolar Disorder  symptoms and follow medication recommendations.   3. Identification and management of triggers  a. Client and therapist will examine patient s history to determine if there are predictable triggers for manic or depressive episodes (e.g. boredom,  anger, family stress)  b. Client and therapist will develop means of diffusing these triggers (e.g. relaxation strategies, boundary setting, anger management)  4. Comorbid conditions   a. Client and therapist will asses for comorbid conditions (e.g. anxiety, depression, substance use). and add additional items to treatment plan as needed  5. Self-care  a. Client will identify 3 things they can do just for themselves  b. Client will take time for quiet, reflection, meditation 3 times per week  c. Client will Learn to set boundaries when appropriate  d. Client will Identify 2 individuals they can call on for support, distraction  5. Behavioral Activation  a. Client will Identify two forms of exercise/activity and engage in them 3 times per week  b. Client will Identify 2 things that make them laugh, and engage in these 3 times per week.  c. Client will Identify 2 Creative activities or hobbies  and engage in them 2 times per week  d. Client will identify music, movies, books that make them feel good and use them 3 times per week  6. Assessment of progress  a. Client will engage in assessment of their progress on a regular basis    Client has reviewed and agreed to the above plan.      Kd Morris  4/3/2018

## 2019-02-22 NOTE — PROGRESS NOTES
ANTICOAGULATION FOLLOW-UP CLINIC VISIT    Patient Name:  Bia Bill  Date:  2019  Contact Type:  Telephone    SUBJECTIVE:     Patient Findings     Positives:   Inflammation (Recovering from URI)    Comments:   No changes in medications, activity, or diet noted. No bleeding or increased bruising noted. Took warfarin as prescribed.  Patient had 60 mg in the last 7 days. Writer adjusted dose to 57.5 mg by the next INR.   Recheck the INR in 10 days.   Patient verbalizes understanding and agrees to plan. No further questions or concerns.             OBJECTIVE    INR   Date Value Ref Range Status   2019 2.91 (H) 0.86 - 1.14 Final       ASSESSMENT / PLAN  INR assessment SUPRA    Recheck INR In: 10 DAYS    INR Location Outside lab      Anticoagulation Summary  As of 2019    INR goal:   2.0-2.5   TTR:   48.4 % (3.9 y)   INR used for dosin.91! (2019)   Warfarin maintenance plan:   10 mg (5 mg x 2) every Mon, Wed, Fri; 12.5 mg (5 mg x 2.5) all other days   Full warfarin instructions:   : 5 mg; : 7.5 mg; : 7.5 mg; : 7.5 mg; : 7.5 mg; 3/2: 7.5 mg; 3/3: 7.5 mg; 3/5: 7.5 mg; Otherwise 10 mg every Mon, Wed, Fri; 12.5 mg all other days   Weekly warfarin total:   80 mg   Plan last modified:   Angelina Nj RN (2018)   Next INR check:   3/6/2019   Priority:   INR   Target end date:   Indefinite    Indications    PVD (peripheral vascular disease) with claudication (H) [I73.9]  Long term current use of anticoagulant therapy [Z79.01]  DVT (deep venous thrombosis) (H) [I82.409]  Stroke (H) [I63.9]             Anticoagulation Episode Summary     INR check location:       Preferred lab:       Send INR reminders to:   [place] PHONE [email or web address] POOL    Comments:   * lab draw due to elevated hematocrit (fingerstick meters don't work). LEFT LE. STENT x 3 LEFT UPPER LEG. Previous arterial clot. New goal range 2-2.5 starting 17.      Anticoagulation Care Providers     Provider Role  Specialty Phone number    Kd Leong MD [facility] [phone]            See the Encounter Report to view Anticoagulation Flowsheet and Dosing Calendar (Go to Encounters tab in chart review, and find the Anticoagulation Therapy Visit)        Reina Forrest RN

## 2019-02-22 NOTE — TELEPHONE ENCOUNTER
Norah calling and will be out of both medication tomorrow (Saturday).  Please review and fill if appropriate.  Thank you..Lizzette Mcintosh    gabapentin      Last Written Prescription Date:  2/6/19  Last Fill Quantity: 90   # refills: 0  Last Office Visit: 1/3/19  Future Office visit:    Next 5 appointments (look out 90 days)    Feb 22, 2019  1:00 PM CST  Return Visit with Kd CHAWLA [address] [address]  [phone]   Mar 29, 2019  2:00 PM CDT  Return Visit with Joshua Watts MD  [facility] [address] [address]  [phone]           Routing refill request to provider for review/approval because:  Drug not on the [facility], [facility] or [facility] refill protocol or controlled substance    lamotrigine      Last Written Prescription Date:  1/23/19  Last Fill Quantity: 30,   # refills: 0  Last Office Visit: 1/3/19  Future Office visit:    Next 5 appointments (look out 90 days)    Feb 22, 2019  1:00 PM CST  Return Visit with Kd Hutchins[address] [address]  [phone]   Mar 29, 2019  2:00 PM CDT  Return Visit with Joshua Watts MD  [facility] [address] [address]  [phone]           Routing refill request to provider for review/approval because:  Drug not on the [facility], [facility] or [facility] refill protocol or controlled substance

## 2019-02-26 DIAGNOSIS — F33.1 MAJOR DEPRESSIVE DISORDER, RECURRENT EPISODE, MODERATE (H): ICD-10-CM

## 2019-02-26 RX ORDER — ARIPIPRAZOLE 10 MG/1
TABLET ORAL
Qty: 90 TABLET | Refills: 1 | Status: SHIPPED | OUTPATIENT
Start: 2019-02-26 | End: 2019-08-28

## 2019-02-26 NOTE — TELEPHONE ENCOUNTER
Requested Prescriptions   Pending Prescriptions Disp Refills     ARIPiprazole (ABILIFY) 10 MG tablet [Pharmacy Med Name: ARIPIPRAZOLE 10MG TABS]  Last Written Prescription Date:  9/12/18  Last Fill Quantity: 90,  # refills: 1   Last office visit: 1/3/2019 with prescribing provider:  Kennedy   Future Office Visit:   Next 5 appointments (look out 90 days)    Mar 06, 2019 12:00 PM CST  Return Visit with Kd [facility] [address] [address]  [phone]   Mar 20, 2019  1:00 PM CDT  [facility] with [facility] [address] [address]  [phone]   Mar 29, 2019  2:00 PM CDT  Return Visit with Joshua Watts MD  [facility]  [address]  [facility] [address]  [phone]          90 tablet 1     Sig: TAKE ONE TABLET BY MOUTH EVERY DAY    Antipsychotic Medications Passed - 2/26/2019 12:12 PM       Passed - Blood pressure under 140/90 in past 12 months    BP Readings from Last 3 Encounters:   01/11/19 125/54   01/04/19 120/64   01/03/19 130/64                Passed - Patient is 12 years of age or older       Passed - Lipid panel on file within the past 12 months    Recent Labs   Lab Test 04/06/18  1316  09/10/15  1332   CHOL 187   < > 135   TRIG 198*   < > 128   HDL 46*   < > 27*   *   < > 82   NHDL 141*   < >  --    VLDL  --   --  26   CHOLHDLRATIO  --   --  5.0    < > = values in this interval not displayed.              Passed - CBC on file in past 12 months    Recent Labs   Lab Test 02/22/19  1405  01/03/19  1441   WBC  --   --  5.5   RBC  --   --  6.66*   HGB  --   --  19.3*   HCT 61.6*   < > 62.2*   PLT  --   --  116*    < > = values in this interval not displayed.                Passed - Heart Rate on file within past 12 months    Pulse Readings from Last 3  "Encounters:   01/11/19 83   01/04/19 84   01/03/19 68              Passed - A1c or Glucose on file in past 12 months    Recent Labs   Lab Test 12/18/18  0109  12/29/16  1415   *   < >  --    A1C  --   --  6.2*    < > = values in this interval not displayed.       Please review patients last 3 weights. If a weight gain of >10 lbs exists, you may refill the prescription once after instructing the patient to schedule an appointment within the next 30 days.    Wt Readings from Last 3 Encounters:   01/11/19 105.7 kg (233 lb)   01/03/19 105.7 kg (233 lb)   12/19/18 104.8 kg (231 lb)            Passed - Medication is active on med list       Passed - Patient is not pregnant       Passed - No positve pregnancy test on file in past 12 months       Passed - Recent (6 mo) or future (30 days) visit within the authorizing provider's specialty    Patient had office visit in the last 6 months or has a visit in the next 30 days with authorizing provider or within the authorizing provider's specialty.  See \"Patient Info\" tab in inbasket, or \"Choose Columns\" in Meds & Orders section of the refill encounter.              "

## 2019-02-27 ENCOUNTER — TRANSFERRED RECORDS (OUTPATIENT)
Dept: HEALTH INFORMATION MANAGEMENT | Facility: CLINIC | Age: 54
End: 2019-02-27

## 2019-03-06 ENCOUNTER — ANTICOAGULATION THERAPY VISIT (OUTPATIENT)
Dept: ANTICOAGULATION | Facility: CLINIC | Age: 54
End: 2019-03-06

## 2019-03-06 ENCOUNTER — OFFICE VISIT (OUTPATIENT)
Dept: PSYCHOLOGY | Facility: CLINIC | Age: 54
End: 2019-03-06
Payer: COMMERCIAL

## 2019-03-06 DIAGNOSIS — I82.409 DVT (DEEP VENOUS THROMBOSIS) (H): ICD-10-CM

## 2019-03-06 DIAGNOSIS — Z79.01 LONG TERM CURRENT USE OF ANTICOAGULANT THERAPY: ICD-10-CM

## 2019-03-06 DIAGNOSIS — I73.9 PVD (PERIPHERAL VASCULAR DISEASE) WITH CLAUDICATION (H): ICD-10-CM

## 2019-03-06 DIAGNOSIS — F31.62 MODERATE MIXED BIPOLAR I DISORDER (H): Primary | ICD-10-CM

## 2019-03-06 DIAGNOSIS — I63.9 STROKE (H): ICD-10-CM

## 2019-03-06 DIAGNOSIS — Z86.73 HISTORY OF STROKE: ICD-10-CM

## 2019-03-06 LAB — INR PPP: 1.3 (ref 0.86–1.14)

## 2019-03-06 PROCEDURE — 90834 PSYTX W PT 45 MINUTES: CPT | Performed by: PSYCHOLOGIST

## 2019-03-06 PROCEDURE — 85610 PROTHROMBIN TIME: CPT | Performed by: FAMILY MEDICINE

## 2019-03-06 PROCEDURE — 36415 COLL VENOUS BLD VENIPUNCTURE: CPT | Performed by: FAMILY MEDICINE

## 2019-03-06 PROCEDURE — 99207 ZZC NO CHARGE NURSE ONLY: CPT

## 2019-03-06 NOTE — PROGRESS NOTES
ANTICOAGULATION FOLLOW-UP CLINIC VISIT    Patient Name:  Bia Bill  Date:  3/6/2019  Contact Type:  Telephone    SUBJECTIVE:     Patient Findings     Positives:   Unexplained INR or factor level change    Comments:   Patient had her dose previously reduced due to elevated INR r/t URI.   Patient states she is feeling better.   Patient had 57.5 mg in the past 7 days. Writer increased dose to 65 mg by the next INR.  Recheck in 1 week.   Patient verbalizes understanding and agrees to plan. No further questions or concerns.              OBJECTIVE    INR   Date Value Ref Range Status   2019 1.30 (H) 0.86 - 1.14 Final       ASSESSMENT / PLAN  INR assessment SUB    Recheck INR In: 1 WEEK    INR Location Outside lab      Anticoagulation Summary  As of 3/6/2019    INR goal:   2.0-2.5   TTR:   48.3 % (3.9 y)   INR used for dosin.30! (3/6/2019)   Warfarin maintenance plan:   10 mg (5 mg x 2) every Mon, Wed, Fri; 12.5 mg (5 mg x 2.5) all other days   Full warfarin instructions:   3: 10 mg; 3/8: 7.5 mg; 3/9: 10 mg; 3/10: 10 mg; 3/11: 7.5 mg; 3/12: 10 mg; Otherwise 10 mg every Mon, Wed, Fri; 12.5 mg all other days   Weekly warfarin total:   80 mg   Plan last modified:   Angelina Nj RN (2018)   Next INR check:   3/13/2019   Priority:   INR   Target end date:   Indefinite    Indications    PVD (peripheral vascular disease) with claudication (H) [I73.9]  Long term current use of anticoagulant therapy [Z79.01]  DVT (deep venous thrombosis) (H) [I82.409]  Stroke (H) [I63.9]             Anticoagulation Episode Summary     INR check location:       Preferred lab:       Send INR reminders to:   [employer] [facility] POOL    Comments:   * lab draw due to elevated hematocrit (fingerstick meters don't work). LEFT LE. STENT x 3 LEFT UPPER LEG. Previous arterial clot. New goal range 2-2.5 starting 17.      Anticoagulation Care Providers     Provider Role Specialty Phone number    Kd Leong MD  [facility] [phone]            See the Encounter Report to view Anticoagulation Flowsheet and Dosing Calendar (Go to Encounters tab in chart review, and find the Anticoagulation Therapy Visit)        Reina Forrest RN

## 2019-03-08 ASSESSMENT — ANXIETY QUESTIONNAIRES
7. FEELING AFRAID AS IF SOMETHING AWFUL MIGHT HAPPEN: NOT AT ALL
GAD7 TOTAL SCORE: 5
5. BEING SO RESTLESS THAT IT IS HARD TO SIT STILL: SEVERAL DAYS
4. TROUBLE RELAXING: SEVERAL DAYS
2. NOT BEING ABLE TO STOP OR CONTROL WORRYING: NOT AT ALL
6. BECOMING EASILY ANNOYED OR IRRITABLE: MORE THAN HALF THE DAYS
3. WORRYING TOO MUCH ABOUT DIFFERENT THINGS: NOT AT ALL
1. FEELING NERVOUS, ANXIOUS, OR ON EDGE: SEVERAL DAYS

## 2019-03-08 ASSESSMENT — PATIENT HEALTH QUESTIONNAIRE - PHQ9: SUM OF ALL RESPONSES TO PHQ QUESTIONS 1-9: 9

## 2019-03-08 NOTE — PROGRESS NOTES
Progress Note  Disclaimer: This note consists of symbols derived from keyboarding, dictation and/or voice recognition software. As a result, there may be errors in the script that have gone undetected. Please consider this when interpreting information found in this chart.    Client Name: Bia Bill  Date: 3/6/2019         Service Type: Individual      Session Start Time: 12:00 PM session End Time 12:45 PM  session Length: 45     Session #: 44     Attendees: Client attended alone    Treatment Plan Last Reviewed: 10/19/2018  PHQ-9 / CHIN-7 : See flowsheet     DATA  Client reports she has decreased her smoking to 2 half a pack per day from 2 packs/day.  Congratulated client on this as an effort.  Client spent the weekend at her daughter's and crime of her daughter on a lot of things particularly her daughter and her boyfriend's drinking.  Client expressed optimism that her daughter may have listened to her.  She is reporting walking around her block most days and planning on increasing that as the weather improves.   Progress Since Last Session (Related to Symptoms / Goals / Homework):   Symptoms: Stable    Homework: Achieved / completed to satisfaction      Episode of Care Goals: Satisfactory progress - ACTION (Actively working towards change); Intervened by reinforcing change plan / affirming steps taken     Current / Ongoing Stressors and Concerns:   Recurrent depression  family relational problems, medical problems, chronic pain, trauma history      Treatment Objective(s) Addressed in This Session:   Increase interest, engagement, and pleasure in doing things  Decrease frequency and intensity of feeling down, depressed, hopeless       Intervention:   CBT: Behavioral activation supported client's effort at establishing maintaining healthy boundaries with her family.    ASSESSMENT: Current Emotional / Mental Status (status of significant symptoms):   Risk status  (Self / Other harm or suicidal ideation)   Client denies current fears or concerns for personal safety.   Client denies current or recent suicidal ideation or behaviors.   Client denies current or recent homicidal ideation or behaviors.   Client denies current or recent self injurious behavior or ideation.   Client denies other safety concerns.   A safety and risk management plan has not been developed at this time, however client was given the after-hours number / 911 should there be a change in any of these risk factors.     Appearance:   Appropriate    Eye Contact:   Good    Psychomotor Behavior: Normal    Attitude:   Cooperative    Orientation:   All   Speech    Rate / Production: Normal     Volume:  Normal    Mood:    Normal   Affect:    Appropriate    Thought Content:  Clear    Thought Form:  Coherent  Logical    Insight:    Good      Medication Review:   No changes to current psychiatric medication(s)     Medication Compliance:   Yes     Changes in Health Issues:   None reported     Chemical Use Review:   Substance Use: Chemical use reviewed, no active concerns identified      Tobacco Use: Client reports she is down to 1/2 pack/day from 2 packs/day.   Collateral Reports Completed:   Not Applicable    PLAN: (Client Tasks / Therapist Tasks / Other)  Client to continue With her self-care routine and maintenance of boundaries.    Practice one to 2 grounding techniques each day.   Client to maintain sobriety and contact with supportive sober others.  Kd Morris                                                         ________________________________________________________________________    Treatment Plan    Client's Name: Bia Bill  YOB: 1965    Date: 10/24/2017      DSM5 Diagnoses: (Sustained by DSM5 Criteria Listed Above)  Diagnoses: 296.40 Bipolar I Disorder Current or Most Recent Episode Manic, Unspecified  Psychosocial & Contextual Factors: financial difficulties, chronic pain,  roommate issues  WHODAS 2.0 (12 item)               This questionnaire asks about difficulties due to health conditions. Health conditions             include disease or illnesses, other health problems that may be short or long lasting,             injuries, mental health or emotional problems, and problems with alcohol or drugs.                         Think back over the past 30 days and answer these questions, thinking about how much             difficulty you had doing the following activities. For each question, please [place] only             one response.      S1  Standing for long periods such as 30 minutes?  Mild =           2    S2  Taking care of household responsibilities?  Moderate =   3    S3  Learning a new task, for example, learning how to get to a new place?  Mild =           2    S4  How much of a problem do you have joining community activities (for example, festivals, Islam or other activities) in the same way as anyone else can?  Moderate =   3    S5  How much have you been emotionally affected by your health problems?  Mild =           2        In the past 30 days, how much difficulty did you have in:    S6  Concentrating on doing something for ten minutes?  Mild =           2    S7  Walking a long distance such as a kilometer (or equivalent)?  Severe =       4    S8  Washing your whole body?  None =         1    S9  Getting dressed?  None =         1    S10  Dealing with people you do not know?  Moderate =   3    S11  Maintaining a friendship?  Severe =       4    S12  Your day to day work?  Moderate =   3       H1  Overall, in the past 30 days, how many days were these difficulties present?  Record number of days seven    H2  In the past 30 days, for how many days were you totally unable to carry out your usual activities or work because of any health condition?  Record number of days  seven    H3  In the past 30 days, not counting the days that you were totally unable, for how many days  did you cut back or reduce your usual activities or work because of any health condition?  Record number of days five                   Referral / Collaboration:  Referral to another professional/service is not indicated at this time..    Anticipated number of session or this episode of care: 15    Goals  1. Education- the Biopsychosocial model of depression  a. Client will be able to describe how depression is effecting them physically, emotionally and socially  2. Behavioral Activation  a. Client will learn to assess their depression on a day to day basis  b. Client will Identify two forms of exercise/activity and engage in them 3 times per week  c. Client will Identify 3 things that make them laugh, and engage in these 5 times per week.  d. Client will Identify 1-2Creative activities or hobbies  and engage in them 2 times per week  e. Client will identify music, movies, books that make them feel good and use them 3-4 times per week  3. Self-care  a. Client will identify 5 things they can do just for themselves  b. Client will take time for quiet, reflection, meditation 5 times per week  c. Client will Learn to set boundaries when appropriate  d. Client will Identify 2 individuals they can call on for support, distraction  4. Assessment of progress  a. Client will engage in assessment of their progress on a regular basis    Bipolar Disorder  Treatment plan:  1. Education- the Biopsychosocial model of Bipolar Disorder    a. Client will be able to describe in general terms what Bipolar Disorder  is and is not  b. Client will be able to describe how Bipolar Disorder  has affected their life in at least two different areas, such as school or work and Home/relationships  c. Clients parents/guardians or significant others will be provided information on what Bipolar Disorder  is and the ways it can affect relationships and be encouraged to be a part of clients treatment team.  2. Medication  a. Client will participate  in medication evaluation for Bipolar Disorder  symptoms and follow medication recommendations.   3. Identification and management of triggers  a. Client and therapist will examine patient s history to determine if there are predictable triggers for manic or depressive episodes (e.g. boredom, anger, family stress)  b. Client and therapist will develop means of diffusing these triggers (e.g. relaxation strategies, boundary setting, anger management)  4. Comorbid conditions   a. Client and therapist will asses for comorbid conditions (e.g. anxiety, depression, substance use). and add additional items to treatment plan as needed  5. Self-care  a. Client will identify 3 things they can do just for themselves  b. Client will take time for quiet, reflection, meditation 3 times per week  c. Client will Learn to set boundaries when appropriate  d. Client will Identify 2 individuals they can call on for support, distraction  5. Behavioral Activation  a. Client will Identify two forms of exercise/activity and engage in them 3 times per week  b. Client will Identify 2 things that make them laugh, and engage in these 3 times per week.  c. Client will Identify 2 Creative activities or hobbies  and engage in them 2 times per week  d. Client will identify music, movies, books that make them feel good and use them 3 times per week  6. Assessment of progress  a. Client will engage in assessment of their progress on a regular basis    Client has reviewed and agreed to the above plan.      Kd Morris  4/3/2018

## 2019-03-09 ASSESSMENT — ANXIETY QUESTIONNAIRES: GAD7 TOTAL SCORE: 5

## 2019-03-13 ENCOUNTER — ANTICOAGULATION THERAPY VISIT (OUTPATIENT)
Dept: ANTICOAGULATION | Facility: CLINIC | Age: 54
End: 2019-03-13

## 2019-03-13 DIAGNOSIS — I82.409 DVT (DEEP VENOUS THROMBOSIS) (H): ICD-10-CM

## 2019-03-13 DIAGNOSIS — I63.9 STROKE (H): Primary | ICD-10-CM

## 2019-03-13 DIAGNOSIS — Z79.01 LONG TERM CURRENT USE OF ANTICOAGULANT THERAPY: ICD-10-CM

## 2019-03-13 DIAGNOSIS — Z86.73 HISTORY OF STROKE: ICD-10-CM

## 2019-03-13 DIAGNOSIS — I73.9 PVD (PERIPHERAL VASCULAR DISEASE) WITH CLAUDICATION (H): ICD-10-CM

## 2019-03-13 DIAGNOSIS — Z79.01 LONG TERM CURRENT USE OF ANTICOAGULANT THERAPY: Chronic | ICD-10-CM

## 2019-03-13 DIAGNOSIS — I63.9 STROKE (H): ICD-10-CM

## 2019-03-13 LAB
HCT VFR BLD AUTO: 61.6 % (ref 35–47)
INR PPP: 1.71 (ref 0.86–1.14)

## 2019-03-13 PROCEDURE — 85610 PROTHROMBIN TIME: CPT | Performed by: FAMILY MEDICINE

## 2019-03-13 PROCEDURE — 99207 ZZC NO CHARGE NURSE ONLY: CPT

## 2019-03-13 PROCEDURE — 36415 COLL VENOUS BLD VENIPUNCTURE: CPT | Performed by: FAMILY MEDICINE

## 2019-03-13 PROCEDURE — 85014 HEMATOCRIT: CPT | Performed by: FAMILY MEDICINE

## 2019-03-13 RX ORDER — WARFARIN SODIUM 5 MG/1
TABLET ORAL
Qty: 200 TABLET | Refills: 0 | Status: SHIPPED | OUTPATIENT
Start: 2019-03-13 | End: 2019-03-21

## 2019-03-13 RX ORDER — WARFARIN SODIUM 5 MG/1
TABLET ORAL
Qty: 205 TABLET | Refills: 0 | Status: CANCELLED | OUTPATIENT
Start: 2019-03-13

## 2019-03-13 RX ORDER — WARFARIN SODIUM 5 MG/1
TABLET ORAL
Qty: 205 TABLET | Refills: 0 | COMMUNITY
Start: 2019-03-13 | End: 2019-03-13

## 2019-03-13 NOTE — TELEPHONE ENCOUNTER
Current warfarin dose:  Full warfarin instructions:   12.5 mg every Wed, Sat; 10 mg all other days   Weekly warfarin total:   75 mg   Next INR check:   3/29/2019         Last INR result:  INR   Date Value Ref Range Status   03/13/2019 1.71 (H) 0.86 - 1.14 Final     Comment:     Special tube used to correct for high hematocrit       Refill authorized per [facility] protocol.    Camilo TIWARI RN, CACP

## 2019-03-13 NOTE — PROGRESS NOTES
3/13/19 ADDENDUM: Writer spoke with patient. Patient denies any identifiable changes that caused the subtherapeutic INR. Patient will recheck her INR in 1 week when she is already in the clinic for another appointment.     Patient denies concerns of chest pain, shortness of breath, or difficulty breathing. No issues with numbness, weakness, difficulty speaking, blurred vision, sudden severe headache, or fainting/seizures. There are no reports of pain/tenderness or other indications patient may have a new or worsening clot.     Patient is aware to go into the emergency room for any of the above mentioned concerns.       Camilo TIWARI RN, CACP     ANTICOAGULATION FOLLOW-UP CLINIC VISIT    Patient Name:  Bia Bill  Date:  3/13/2019  Contact Type:  Telephone/ Left DVM    SUBJECTIVE:     Patient Findings     Positives:   Other complaints (Unknown sub-therapeutic INR)    Comments:   Writer left DVM.  Patient had 65 mg in the last 7 days. Writer increased dose to 75 mg by the next INR.  Recheck in 2 weeks.           OBJECTIVE    INR   Date Value Ref Range Status   2019 1.71 (H) 0.86 - 1.14 Final     Comment:     Special tube used to correct for high hematocrit       ASSESSMENT / PLAN  INR assessment SUB    Recheck INR In: 2 WEEKS    INR Location Outside lab      Anticoagulation Summary  As of 3/13/2019    INR goal:   2.0-2.5   TTR:   48.0 % (3.9 y)   INR used for dosin.71! (3/13/2019)   Warfarin maintenance plan:   12.5 mg (5 mg x 2.5) every Wed, Sat; 10 mg (5 mg x 2) all other days   Full warfarin instructions:   12.5 mg every Wed, Sat; 10 mg all other days   Weekly warfarin total:   75 mg   Plan last modified:   Reina Forrest RN (3/13/2019)   Next INR check:   3/29/2019   Priority:   INR   Target end date:   Indefinite    Indications    PVD (peripheral vascular disease) with claudication (H) [I73.9]  Long term current use of anticoagulant therapy [Z79.01]  DVT (deep venous thrombosis) (H)  [I82.409]  Stroke (H) [I63.9]             Anticoagulation Episode Summary     INR check location:       Preferred lab:       Send INR reminders to:   [place] PH[facility] Actual Experience POOL    Comments:   * lab draw due to elevated hematocrit (fingerstick meters don't work). LEFT LE. STENT x 3 LEFT UPPER LEG. Previous arterial clot. New goal range 2-2.5 starting 11/6/17.      Anticoagulation Care Providers     Provider Role Specialty Phone number    Kd Leong MD [facility] [phone]            See the Encounter Report to view Anticoagulation Flowsheet and Dosing Calendar (Go to Encounters tab in chart review, and find the Anticoagulation Therapy Visit)        Reina Forrest RN

## 2019-03-20 ENCOUNTER — ANTICOAGULATION THERAPY VISIT (OUTPATIENT)
Dept: ANTICOAGULATION | Facility: CLINIC | Age: 54
End: 2019-03-20
Payer: COMMERCIAL

## 2019-03-20 ENCOUNTER — OFFICE VISIT (OUTPATIENT)
Dept: PSYCHOLOGY | Facility: CLINIC | Age: 54
End: 2019-03-20
Payer: COMMERCIAL

## 2019-03-20 ENCOUNTER — TELEPHONE (OUTPATIENT)
Dept: FAMILY MEDICINE | Facility: CLINIC | Age: 54
End: 2019-03-20

## 2019-03-20 DIAGNOSIS — I73.9 PVD (PERIPHERAL VASCULAR DISEASE) WITH CLAUDICATION (H): ICD-10-CM

## 2019-03-20 DIAGNOSIS — Z79.01 LONG TERM CURRENT USE OF ANTICOAGULANT THERAPY: ICD-10-CM

## 2019-03-20 DIAGNOSIS — E78.5 HYPERLIPIDEMIA LDL GOAL <100: Primary | ICD-10-CM

## 2019-03-20 DIAGNOSIS — I82.409 DVT (DEEP VENOUS THROMBOSIS) (H): ICD-10-CM

## 2019-03-20 DIAGNOSIS — Z86.73 HISTORY OF STROKE: ICD-10-CM

## 2019-03-20 DIAGNOSIS — F31.62 MODERATE MIXED BIPOLAR I DISORDER (H): Primary | ICD-10-CM

## 2019-03-20 LAB
HCT VFR BLD AUTO: 61.2 % (ref 35–47)
INR PPP: 1.82 (ref 0.86–1.14)

## 2019-03-20 PROCEDURE — 85014 HEMATOCRIT: CPT | Performed by: FAMILY MEDICINE

## 2019-03-20 PROCEDURE — 85610 PROTHROMBIN TIME: CPT | Performed by: FAMILY MEDICINE

## 2019-03-20 PROCEDURE — 99207 ZZC NO CHARGE NURSE ONLY: CPT

## 2019-03-20 PROCEDURE — 36415 COLL VENOUS BLD VENIPUNCTURE: CPT | Performed by: FAMILY MEDICINE

## 2019-03-20 PROCEDURE — 90834 PSYTX W PT 45 MINUTES: CPT | Performed by: PSYCHOLOGIST

## 2019-03-20 NOTE — TELEPHONE ENCOUNTER
Bia came to lab fasting and would like to have her Cholesterol checked today. Please place order for her in Epic so she can have a result.  Thanks.    Mahi corrales

## 2019-03-20 NOTE — PROGRESS NOTES
03/21/19    Writer left VM requesting call back to [facility] to verify dosing.     Reina Forrest RN, BSN, PHN  Anticoagulation Clinic   [phone]        Writer left message to call [facility] back. It would be good to verify if she took warfarin as directed since we increased her dosing this last week.     If patient took warfarin as directed, will likely need to increase maintenance dose further. Could try 10 mg MWF; 12.5 mg ROW.    This would depend on assessment.    Mireille Torres, RN, BSN, PHN  3-

## 2019-03-21 RX ORDER — WARFARIN SODIUM 5 MG/1
TABLET ORAL
Qty: 200 TABLET | Refills: 0 | COMMUNITY
Start: 2019-03-21 | End: 2019-07-01

## 2019-03-21 NOTE — PROGRESS NOTES
ANTICOAGULATION FOLLOW-UP CLINIC VISIT    Patient Name:  Bia Bill  Date:  3/21/2019  Contact Type:  Telephone    SUBJECTIVE:     Patient Findings     Comments:   No changes in medications, activity, health, or diet noted. No bleeding or increased bruising noted. Took warfarin as prescribed.  Patient had 75 mg in the past 7 days. Writer adjusted maintenance dose to 80 mg weekly.   Recheck INR in 10 days.   Patient verbalizes understanding and agrees to plan. No further questions or concerns.             OBJECTIVE    INR   Date Value Ref Range Status   2019 1.82 (H) 0.86 - 1.14 Final     Comment:     Special tube used to correct for high hematocrit       ASSESSMENT / PLAN  INR assessment SUB    Recheck INR In: 10 DAYS    INR Location Outside lab      Anticoagulation Summary  As of 3/20/2019    INR goal:   2.0-2.5   TTR:   47.8 % (3.9 y)   INR used for dosin.82! (3/20/2019)   Warfarin maintenance plan:   10 mg (5 mg x 2) every Mon, Wed, Fri; 12.5 mg (5 mg x 2.5) all other days   Full warfarin instructions:   10 mg every Mon, Wed, Fri; 12.5 mg all other days   Weekly warfarin total:   80 mg   Plan last modified:   Reina Forrest RN (3/21/2019)   Next INR check:   3/29/2019   Priority:   INR   Target end date:       Indications    PVD (peripheral vascular disease) with claudication (H) [I73.9]  Long term current use of anticoagulant therapy [Z79.01]  DVT (deep venous thrombosis) (H) [I82.409]  Stroke (H) [I63.9]             Anticoagulation Episode Summary     INR check location:       Preferred lab:       Send INR reminders to:   [facility] [employer] POOL    Comments:   * lab draw due to elevated hematocrit (fingerstick meters don't work). LEFT LE. STENT x 3 LEFT UPPER LEG. Previous arterial clot. New goal range 2-2.5 starting 17.      Anticoagulation Care Providers     Provider Role Specialty Phone number    Kd Leong MD [facility] [phone]            See the  Encounter Report to view Anticoagulation Flowsheet and Dosing Calendar (Go to Encounters tab in chart review, and find the Anticoagulation Therapy Visit)        Reina Forrest RN

## 2019-03-21 NOTE — ADDENDUM NOTE
Addended by: LEONELA WYATT on: 3/21/2019 11:11 AM     Modules accepted: Orders, Level of Service, SmartSet

## 2019-03-22 DIAGNOSIS — G63 POLYNEUROPATHY IN OTHER DISEASES CLASSIFIED ELSEWHERE (H): Chronic | ICD-10-CM

## 2019-03-22 ASSESSMENT — ANXIETY QUESTIONNAIRES
1. FEELING NERVOUS, ANXIOUS, OR ON EDGE: MORE THAN HALF THE DAYS
7. FEELING AFRAID AS IF SOMETHING AWFUL MIGHT HAPPEN: NOT AT ALL
6. BECOMING EASILY ANNOYED OR IRRITABLE: NEARLY EVERY DAY
2. NOT BEING ABLE TO STOP OR CONTROL WORRYING: SEVERAL DAYS
5. BEING SO RESTLESS THAT IT IS HARD TO SIT STILL: SEVERAL DAYS
3. WORRYING TOO MUCH ABOUT DIFFERENT THINGS: SEVERAL DAYS
4. TROUBLE RELAXING: MORE THAN HALF THE DAYS
GAD7 TOTAL SCORE: 10

## 2019-03-22 ASSESSMENT — PATIENT HEALTH QUESTIONNAIRE - PHQ9: SUM OF ALL RESPONSES TO PHQ QUESTIONS 1-9: 10
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
## 2019-03-22 NOTE — TELEPHONE ENCOUNTER
GABAPENTIN 600MG TABS      Last Written Prescription Date:  2/22/19  Last Fill Quantity: 90,   # refills: 0  Last Office Visit: 1/3/19  Future Office visit:    Next 5 appointments (look out 90 days)    Mar 29, 2019  2:00 PM CDT  Return Visit with Jsohua Watts MD  [facility] ([facility]) [facility]  [address] [place] [address]  [facility] [address]  [phone]   Apr 03, 2019  9:30 AM CDT  Return Visit with Kd CHAWLA [facility] ([facility]) [address] [address]  [phone]   Apr 17, 2019  1:00 PM CDT  Return Visit with [facility] ([facility]) [address] [address]  [phone]           Routing refill request to provider for review/approval because:  Drug not on the [facility], [facility] or [facility] refill protocol or controlled substance

## 2019-03-23 RX ORDER — GABAPENTIN 600 MG/1
TABLET ORAL
Qty: 90 TABLET | Refills: 0 | Status: SHIPPED | OUTPATIENT
Start: 2019-03-23 | End: 2019-04-22

## 2019-03-23 ASSESSMENT — ANXIETY QUESTIONNAIRES: GAD7 TOTAL SCORE: 10

## 2019-03-26 ENCOUNTER — PATIENT OUTREACH (OUTPATIENT)
Dept: CARE COORDINATION | Facility: CLINIC | Age: 54
End: 2019-03-26

## 2019-03-26 ASSESSMENT — ACTIVITIES OF DAILY LIVING (ADL): DEPENDENT_IADLS:: INDEPENDENT

## 2019-03-26 NOTE — PROGRESS NOTES
Clinic Care Coordination Contact  [facility]/Voicemail    Referral Source: ED Follow-Up  Clinical Data: Care Coordinator Outreach  Outreach attempted.  Left message on voicemail with call back information and requested return call.  Plan: Care Coordinator mailed out care coordination introduction letter on 2-14. Care Coordinator will do no further outreaches at this time.  Wei Gray RN  Clinic Care Coordinator  [phone] or [phone]

## 2019-03-27 ENCOUNTER — TELEPHONE (OUTPATIENT)
Dept: FAMILY MEDICINE | Facility: CLINIC | Age: 54
End: 2019-03-27

## 2019-03-27 NOTE — TELEPHONE ENCOUNTER
Reason for Call:  Form, our goal is to have forms completed with 72 hours, however, some forms may require a visit or additional information.    Type of letter, form or note:  medical - [employer] Plan of Care     Who is the form from?: Insurance comp    Where did the form come from: form was faxed in    What clinic location was the form placed at?: [facility]     Where the form was placed: Given to physician    What number is listed as a contact on the form?: [phone]       Additional comments: Fax completed form to:  [phone]    Call taken on 3/27/2019 at 11:58 AM by Lizzette Mcintosh

## 2019-03-29 ENCOUNTER — ANTICOAGULATION THERAPY VISIT (OUTPATIENT)
Dept: ANTICOAGULATION | Facility: CLINIC | Age: 54
End: 2019-03-29
Payer: COMMERCIAL

## 2019-03-29 ENCOUNTER — HOSPITAL ENCOUNTER (OUTPATIENT)
Dept: LAB | Facility: CLINIC | Age: 54
Discharge: HOME | End: 2019-03-29
Attending: INTERNAL MEDICINE | Admitting: INTERNAL MEDICINE
Payer: COMMERCIAL

## 2019-03-29 ENCOUNTER — ONCOLOGY VISIT (OUTPATIENT)
Dept: ONCOLOGY | Facility: CLINIC | Age: 54
End: 2019-03-29
Attending: INTERNAL MEDICINE
Payer: COMMERCIAL

## 2019-03-29 VITALS
RESPIRATION RATE: 18 BRPM | SYSTOLIC BLOOD PRESSURE: 122 MMHG | OXYGEN SATURATION: 94 % | TEMPERATURE: 98.9 F | HEIGHT: 58 IN | BODY MASS INDEX: 49.91 KG/M2 | HEART RATE: 85 BPM | DIASTOLIC BLOOD PRESSURE: 52 MMHG | WEIGHT: 237.8 LBS

## 2019-03-29 DIAGNOSIS — I82.409 DVT (DEEP VENOUS THROMBOSIS) (H): ICD-10-CM

## 2019-03-29 DIAGNOSIS — M1A.3490 CHRONIC GOUT OF HAND DUE TO RENAL IMPAIRMENT WITHOUT TOPHUS, UNSPECIFIED LATERALITY: ICD-10-CM

## 2019-03-29 DIAGNOSIS — J44.9 CHRONIC OBSTRUCTIVE PULMONARY DISEASE, UNSPECIFIED COPD TYPE (H): Chronic | ICD-10-CM

## 2019-03-29 DIAGNOSIS — I82.4Y9 DEEP VEIN THROMBOSIS (DVT) OF PROXIMAL LOWER EXTREMITY, UNSPECIFIED CHRONICITY, UNSPECIFIED LATERALITY (H): ICD-10-CM

## 2019-03-29 DIAGNOSIS — Z79.01 LONG TERM CURRENT USE OF ANTICOAGULANT THERAPY: ICD-10-CM

## 2019-03-29 DIAGNOSIS — F34.1 PERSONALITY DISORDER, DEPRESSIVE: Chronic | ICD-10-CM

## 2019-03-29 DIAGNOSIS — D75.1 ERYTHROCYTOSIS: Primary | Chronic | ICD-10-CM

## 2019-03-29 DIAGNOSIS — I10 ESSENTIAL HYPERTENSION, BENIGN: ICD-10-CM

## 2019-03-29 DIAGNOSIS — E78.5 HYPERLIPIDEMIA LDL GOAL <100: ICD-10-CM

## 2019-03-29 DIAGNOSIS — I82.4Y9 DEEP VEIN THROMBOSIS (DVT) OF PROXIMAL LOWER EXTREMITY, UNSPECIFIED CHRONICITY, UNSPECIFIED LATERALITY (H): Primary | Chronic | ICD-10-CM

## 2019-03-29 DIAGNOSIS — D75.1 POLYCYTHEMIA, SECONDARY: Chronic | ICD-10-CM

## 2019-03-29 DIAGNOSIS — Z86.73 HISTORY OF STROKE: ICD-10-CM

## 2019-03-29 DIAGNOSIS — I63.9 STROKE (H): ICD-10-CM

## 2019-03-29 DIAGNOSIS — D75.1 ERYTHROCYTOSIS: Chronic | ICD-10-CM

## 2019-03-29 DIAGNOSIS — I73.9 PVD (PERIPHERAL VASCULAR DISEASE) WITH CLAUDICATION (H): ICD-10-CM

## 2019-03-29 LAB
BASOPHILS # BLD AUTO: 0 10E9/L (ref 0–0.2)
BASOPHILS NFR BLD AUTO: 0.4 %
DIFFERENTIAL METHOD BLD: ABNORMAL
EOSINOPHIL # BLD AUTO: 0.1 10E9/L (ref 0–0.7)
EOSINOPHIL NFR BLD AUTO: 1.7 %
ERYTHROCYTE [DISTWIDTH] IN BLOOD BY AUTOMATED COUNT: 21.1 % (ref 10–15)
HCT VFR BLD AUTO: 61.8 % (ref 35–47)
HGB BLD-MCNC: 19.1 G/DL (ref 11.7–15.7)
IMM GRANULOCYTES # BLD: 0 10E9/L (ref 0–0.4)
IMM GRANULOCYTES NFR BLD: 0.4 %
INR PPP: 2.76 (ref 0.86–1.14)
INR PPP: NORMAL (ref 0.86–1.14)
LYMPHOCYTES # BLD AUTO: 1.4 10E9/L (ref 0.8–5.3)
LYMPHOCYTES NFR BLD AUTO: 27.3 %
MCH RBC QN AUTO: 29.1 PG (ref 26.5–33)
MCHC RBC AUTO-ENTMCNC: 30.9 G/DL (ref 31.5–36.5)
MCV RBC AUTO: 94 FL (ref 78–100)
MONOCYTES # BLD AUTO: 0.3 10E9/L (ref 0–1.3)
MONOCYTES NFR BLD AUTO: 5.8 %
NEUTROPHILS # BLD AUTO: 3.3 10E9/L (ref 1.6–8.3)
NEUTROPHILS NFR BLD AUTO: 64.4 %
NRBC # BLD AUTO: 0 10*3/UL
NRBC BLD AUTO-RTO: 0 /100
PLATELET # BLD AUTO: 112 10E9/L (ref 150–450)
RBC # BLD AUTO: 6.57 10E12/L (ref 3.8–5.2)
WBC # BLD AUTO: 5.2 10E9/L (ref 4–11)

## 2019-03-29 PROCEDURE — 85610 PROTHROMBIN TIME: CPT | Performed by: FAMILY MEDICINE

## 2019-03-29 PROCEDURE — 36415 COLL VENOUS BLD VENIPUNCTURE: CPT | Performed by: FAMILY MEDICINE

## 2019-03-29 PROCEDURE — 85025 COMPLETE CBC W/AUTO DIFF WBC: CPT | Performed by: INTERNAL MEDICINE

## 2019-03-29 PROCEDURE — 36415 COLL VENOUS BLD VENIPUNCTURE: CPT | Performed by: INTERNAL MEDICINE

## 2019-03-29 PROCEDURE — G0463 HOSPITAL OUTPT CLINIC VISIT: HCPCS

## 2019-03-29 PROCEDURE — 99214 OFFICE O/P EST MOD 30 MIN: CPT | Performed by: INTERNAL MEDICINE

## 2019-03-29 PROCEDURE — 99207 ZZC NO CHARGE NURSE ONLY: CPT

## 2019-03-29 ASSESSMENT — MIFFLIN-ST. JEOR: SCORE: 1578.4

## 2019-03-29 ASSESSMENT — PAIN SCALES - GENERAL: PAINLEVEL: NO PAIN (0)

## 2019-03-29 NOTE — LETTER
"    3/29/2019         RE: Bia Bill  [address]  [facility] [address]        Dear Colleague,    Thank you for referring your patient, Bia Bill, to the [facility]. Please see a copy of my visit note below.    Oncology Rooming Note    March 29, 2019 1:48 PM   Bia Bill is a 53 year old female who presents for:    Chief Complaint   Patient presents with     Hematology     6 month follow up Erythrocytosis, Anemia.      Initial Vitals: /52 (BP Location: Right arm, Patient Position: Sitting, Cuff Size: Adult Large)   Pulse 85   Temp 98.9  F (37.2  C) (Tympanic)   Resp 18   Ht 1.473 m (4' 10\")   Wt 107.9 kg (237 lb 12.8 oz)   LMP 09/27/2009   SpO2 94%   Breastfeeding? No   BMI 49.70 kg/m    Estimated body mass index is 49.7 kg/m  as calculated from the following:    Height as of this encounter: 1.473 m (4' 10\").    Weight as of this encounter: 107.9 kg (237 lb 12.8 oz). Body surface area is 2.1 meters squared.  No Pain (0) Comment: Data Unavailable   Patient's last menstrual period was 09/27/2009.  Allergies reviewed: Yes  Medications reviewed: Yes    Medications: Medication refills not needed today.  Pharmacy name entered into [facility]:    [place], [place] - [address] [id number], [place] - [address] [facility], [place] - [address] [place] [address]    Clinical concerns: 6 month follow up Erythrocytosis, Anemia.       Janice Gonzalez [facility]              Hematology/ Oncology Follow-up Visit:  Mar 29, 2019    Reason for Visit:   Chief Complaint   Patient presents with     Hematology     6 month follow up Erythrocytosis, Anemia.        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 She was previously seen by Dr. Lomax on 10/09/2011, but has been lost from followup. The patient has had erythrocytosis with a hemoglobin around 20-23 and hemoglobin and hematocrit of 60-65% for most of her life. She had a previous workup by Dr. Lomax and also by another hematologist at [facility], [place]. Review of her previous tests and workup showed a hemoglobin electrophoresis on 01/17/2007 that showed a hemoglobin A1 of 52%, A2 at 0, hemoglobin F at 1.4% and other hemoglobin 46.2%. JAK2 was negative on 07/31/2007. Hemoglobin affinity testing on 07/31/2007 showed a P50 of 27 (normal between 25 and 29). Repeat hemoglobin electrophoresis on 10/04/2011 showed the hemoglobin was at 39.9%. This illuminated hemoglobin was suggestive of beta globin variant of hemoglobin Hb Robinson. She was also seen by Dr. Peres in 2007 and most of the workup is from there. Erythropoietin level was in the range of the 80s. Vitamin B12 and folic acid were within normal limits. Abdomen ultrasound did not show hepatosplenomegaly, but there is fatty infiltration of the liver. According to the chart, the patient had phlebotomies done in the past to keep her hemoglobin below 20 g/dl. This was done over the summertime of 2011. Most recently she didn't have any phlebotomy. She was started on anticoagulation with Coumadin as she was felt to be at high risk for blood clots and given history of severe arterial disease and stroke in the past. The patient is safe to use her anticoagulation and her most recent INR was 1.74. Evidently her carbon monoxide is also elevated. She is an active smoker, but she is cutting down from 2 packs per day to 3 cigarettes a day over the last one month. Patient denies any DVT or PE. She also evidently claims that her mother and her first son are also having the same hemoglobinopathy and are being  followed by their physicians. Bone marrow biopsy on 11/17/2014 showed no clonal chromosomal abnormality and no cytogenetic evidence of a malignant process. She also tested negative for the JAK2 mutation. Leukemia lymphoma evaluation additionally showed no aberrant immunophenotype on T cells and no increase in or abnormal appearing myeloid blasts.       Interval History:  Patient returning today for follow-up visit.  She has been feeling well without recent complaints weight loss night sweats fever chills patient denies any nausea vomiting or diarrhea.  She denies any bone aches or pains.    Review Of Systems:  Constitutional: Negative for fever, chills, and night sweats.  Skin: negative.  Eyes: negative.  Ears/Nose/Throat: negative.  Respiratory: No shortness of breath, dyspnea on exertion, cough, or hemoptysis.  Cardiovascular: negative.  Gastrointestinal: negative.  Genitourinary: negative.  Musculoskeletal: negative.  Neurologic: negative.  Psychiatric: negative.  Hematologic/Lymphatic/Immunologic: negative.  Endocrine: negative.    All other ROS negative unless mentioned in interval history.    Past medical, social, surgical, and family histories reviewed.    Allergies:  Allergies as of 03/29/2019 - Reviewed 03/29/2019   Allergen Reaction Noted     Darvocet [propoxyphene n-apap] Anaphylaxis 07/26/2006     Percocet [oxycodone-acetaminophen] Anaphylaxis, Hives, and Swelling 06/01/2016     Asa [aspirin] Hives 07/10/2006     Bee  03/26/2010     Ibuprofen Swelling 03/30/2018     Lyrica [pregabalin] Other (See Comments) and Swelling 12/04/2015     Povidone iodine Rash 01/25/2017     Tape [adhesive tape] Rash 08/03/2006       Current Medications:  Current Outpatient Medications   Medication Sig Dispense Refill     acetaminophen (TYLENOL) 500 MG tablet Take 500-1,000 mg by mouth every 6 hours as needed for mild pain       albuterol (2.5 MG/3ML) 0.083% neb solution Take 1 vial (2.5 mg) by nebulization every 6 hours as  needed for shortness of breath / dyspnea or wheezing 1 Box 0     albuterol (PROAIR HFA/PROVENTIL HFA/VENTOLIN HFA) 108 (90 BASE) MCG/ACT Inhaler Inhale 2 puffs into the lungs every 6 hours as needed for shortness of breath / dyspnea or wheezing 3 Inhaler 1     allopurinol (ZYLOPRIM) 300 MG tablet TAKE ONE TABLET BY MOUTH ONE TIME DAILY 90 tablet 3     ARIPiprazole (ABILIFY) 10 MG tablet TAKE ONE TABLET BY MOUTH EVERY DAY 90 tablet 1     buPROPion (WELLBUTRIN XL) 150 MG 24 hr tablet TAKE ONE TABLET BY MOUTH EVERY DAY IN THE MORNING. 30 tablet 5     enoxaparin (LOVENOX) 100 MG/ML syringe Inject 1 mL (100 mg) Subcutaneous every 12 hours Do NOT use within 24 hours before or after procedure 10 Syringe 0     furosemide (LASIX) 20 MG tablet TAKE TWO TABLETS BY MOUTH TWICE DAILY 360 tablet 3     gabapentin (NEURONTIN) 300 MG capsule ONE CAPSULE BY MOUTH AT BEDTIME WITH 600 MG CAPSULE- FOR TOTAL  MG AT BEDTIME 90 capsule 2     gabapentin (NEURONTIN) 600 MG tablet TAKE ONE TABLET BY MOUTH THREE TIMES A DAY 90 tablet 0     HYDROcodone-acetaminophen (NORCO) 5-325 MG tablet Take 1-2 tablets by mouth every 6 hours as needed for pain 30 tablet 0     ipratropium - albuterol 0.5 mg/2.5 mg/3 mL (DUONEB) 0.5-2.5 (3) MG/3ML neb solution Take 1 vial (3 mLs) by nebulization every 6 hours as needed for shortness of breath / dyspnea or wheezing 30 vial 1     lamoTRIgine (LAMICTAL) 200 MG tablet Take 1 tablet (200 mg) by mouth daily 90 tablet 0     levothyroxine (SYNTHROID/LEVOTHROID) 150 MCG tablet Take 1 tablet (150 mcg) by mouth daily 90 tablet 2     lidocaine (XYLOCAINE) 5 % ointment Apply topically 3 times daily as needed for moderate pain 240 g 1     mometasone-formoterol (DULERA) 200-5 MCG/ACT oral inhaler Inhale 2 puffs into the lungs 2 times daily 13 g 1     morphine (MSIR) 15 MG IR tablet Take 1 tablet (15 mg) by mouth every 6 hours as needed for severe pain 8 tablet 0     omeprazole (PRILOSEC) 20 MG CR capsule TAKE ONE  "CAPSULE BY MOUTH TWICE DAILY 180 capsule 3     order for DME Equipment being ordered:x2 [employer] 30/40mmHg compression wraps with x2 extra prs of compression liners 1 each 0     order for DME Equipment being ordered: Nebulizer 1 Device 0     order for DME Equipment being ordered: CPAP  AIRSENSE 10  5-18 CM H20  # [id number]   DN# [id number]       order for DME Equipment being ordered: [id number]\" x2pair     \"20-30mmHg [employer] Hybrid Liners\" x 2 pair 2 Units 11     order for DME Equipment being ordered: DEPENDS SIZE LARGE 60 Month 5     order for DME Equipment being ordered: INCONTINENCE PADS   QID 1 Month 11     potassium chloride SA (K-DUR/KLOR-CON M) 10 MEQ CR tablet TAKE ONE TABLET BY MOUTH ONE TIME DAILY 90 tablet 3     pramipexole (MIRAPEX) 0.125 MG tablet TAKE 1-2 TABLETS BY MOUTH AT BEDTIME 60 tablet 10     simvastatin (ZOCOR) 20 MG tablet TAKE ONE TABLET BY MOUTH AT BEDTIME 90 tablet 1     traZODone (DESYREL) 150 MG tablet Take 1 tablet (150 mg) by mouth At Bedtime 90 tablet 1     VITAMIN D3 1000 units tablet TAKE ONE TABLET BY MOUTH ONE TIME DAILY 30 tablet 2     warfarin (COUMADIN) 5 MG tablet 10 mg (5 mg x 2) every Mon, Wed, Fri; 12.5 mg (5 mg x 2.5) all other days or As directed by Anticoagulation clinic 200 tablet 0     EPINEPHrine (EPIPEN/ADRENACLICK/OR ANY BX GENERIC EQUIV) 0.3 MG/0.3ML injection 2-pack Inject 0.3 mLs (0.3 mg) into the muscle once as needed for anaphylaxis (Patient not taking: Reported on 3/29/2019) 0.6 mL 0        Physical Exam:  /52 (BP Location: Right arm, Patient Position: Sitting, Cuff Size: Adult Large)   Pulse 85   Temp 98.9  F (37.2  C) (Tympanic)   Resp 18   Ht 1.473 m (4' 10\")   Wt 107.9 kg (237 lb 12.8 oz)   LMP 09/27/2009   SpO2 94%   Breastfeeding? No   BMI 49.70 kg/m     Wt Readings from Last 12 Encounters:   03/29/19 107.9 kg (237 lb 12.8 oz)   01/11/19 105.7 kg (233 lb)   01/03/19 105.7 kg (233 lb)   12/19/18 104.8 kg (231 lb)   12/18/18 101.2 kg (223 lb) "   10/03/18 105.1 kg (231 lb 9.6 oz)   09/28/18 104.6 kg (230 lb 9.6 oz)   09/12/18 105.6 kg (232 lb 12.8 oz)   09/06/18 106.1 kg (234 lb)   08/22/18 106.1 kg (234 lb)   08/15/18 105.1 kg (231 lb 9.6 oz)   07/10/18 104.3 kg (230 lb)     ECOG performance status: `1  GENERAL APPEARANCE: Healthy, alert and in no acute distress.  HEENT: Sclerae anicteric. PERRLA. Oropharynx without ulcers, lesions, or thrush.  NECK: Supple. No asymmetry or masses.  LYMPHATICS: No palpable cervical, supraclavicular, axillary, or inguinal lymphadenopathy.  RESP: Lungs clear to auscultation bilaterally without rales, rhonchi or wheezes.  CARDIOVASCULAR: Regular rate and rhythm. Normal S1, S2; no S3 or S4. No murmur, gallop, or rub.  ABDOMEN: Soft, nontender. Bowel sounds normal. No palpable organomegaly or masses.  MUSCULOSKELETAL: Extremities without gross deformities noted. No edema of bilateral lower extremities.  SKIN: No suspicious lesions or rashes.  NEURO: Alert and oriented x 3. Cranial nerves II-XII grossly intact.  PSYCHIATRIC: Mentation and affect appear normal.    Laboratory/Imaging Studies:  Orders Only on 03/29/2019   Component Date Value Ref Range Status     WBC 03/29/2019 5.2  4.0 - 11.0 10e9/L Final     RBC Count 03/29/2019 6.57* 3.8 - 5.2 10e12/L Final     Hemoglobin 03/29/2019 19.1* 11.7 - 15.7 g/dL Final     Hematocrit 03/29/2019 61.8* 35.0 - 47.0 % Final     MCV 03/29/2019 94  78 - 100 fl Final     MCH 03/29/2019 29.1  26.5 - 33.0 pg Final     MCHC 03/29/2019 30.9* 31.5 - 36.5 g/dL Final     RDW 03/29/2019 21.1* 10.0 - 15.0 % Final     Platelet Count 03/29/2019 112* 150 - 450 10e9/L Final     Diff Method 03/29/2019 Automated Method   Final     % Neutrophils 03/29/2019 64.4  % Final     % Lymphocytes 03/29/2019 27.3  % Final     % Monocytes 03/29/2019 5.8  % Final     % Eosinophils 03/29/2019 1.7  % Final     % Basophils 03/29/2019 0.4  % Final     % Immature Granulocytes 03/29/2019 0.4  % Final     Nucleated RBCs  03/29/2019 0  0 /100 Final     Absolute Neutrophil 03/29/2019 3.3  1.6 - 8.3 10e9/L Final     Absolute Lymphocytes 03/29/2019 1.4  0.8 - 5.3 10e9/L Final     Absolute Monocytes 03/29/2019 0.3  0.0 - 1.3 10e9/L Final     Absolute Eosinophils 03/29/2019 0.1  0.0 - 0.7 10e9/L Final     Absolute Basophils 03/29/2019 0.0  0.0 - 0.2 10e9/L Final     Abs Immature Granulocytes 03/29/2019 0.0  0 - 0.4 10e9/L Final     Absolute Nucleated RBC 03/29/2019 0.0   Final     INR 03/29/2019 Canceled, Test credited  0.86 - 1.14 Corrected    Comment: High HCT, plasma/anticoagulant ratio incorrect  CORRECTED ON 03/29 AT 1424: PREVIOUSLY REPORTED AS 2.81     Orders Only on 03/20/2019   Component Date Value Ref Range Status     Hematocrit 03/20/2019 61.2* 35.0 - 47.0 % Final     INR 03/20/2019 1.82* 0.86 - 1.14 Final    Special tube used to correct for high hematocrit          Assessment and plan:    (I82.4Y9) Deep vein thrombosis (DVT) of proximal lower extremity, unspecified chronicity, unspecified laterality (H)  (primary encounter diagnosis)  Patient continues on warfarin.  INR has been monitored through the anticoagulation clinic    (D75.1) Erythrocytosis  I reviewed with patient most today most recent laboratory test.  There is no indication for phlebotomy at this time.    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  Diagnosis and treatment plans were explained to the patient. The patient expressed understanding of the content. The patient asked appropriate questions. The patient questions were answered to her satisfaction.    Chart documentation with Dragon Voice recognition Software. Although reviewed after completion, some words and  grammatical errors may remain.      Again, thank you for allowing me to participate in the care of your patient.        Sincerely,        Joshua Watts MD

## 2019-03-29 NOTE — PROGRESS NOTES
"Oncology Rooming Note    March 29, 2019 1:48 PM   Bia Bill is a 53 year old female who presents for:    Chief Complaint   Patient presents with     Hematology     6 month follow up Erythrocytosis, Anemia.      Initial Vitals: /52 (BP Location: Right arm, Patient Position: Sitting, Cuff Size: Adult Large)   Pulse 85   Temp 98.9  F (37.2  C) (Tympanic)   Resp 18   Ht 1.473 m (4' 10\")   Wt 107.9 kg (237 lb 12.8 oz)   LMP 09/27/2009   SpO2 94%   Breastfeeding? No   BMI 49.70 kg/m   Estimated body mass index is 49.7 kg/m  as calculated from the following:    Height as of this encounter: 1.473 m (4' 10\").    Weight as of this encounter: 107.9 kg (237 lb 12.8 oz). Body surface area is 2.1 meters squared.  No Pain (0) Comment: Data Unavailable   Patient's last menstrual period was 09/27/2009.  Allergies reviewed: Yes  Medications reviewed: Yes    Medications: Medication refills not needed today.  Pharmacy name entered into [facility]:    [place], [place] - [address] [id number] - [place], [place] - [address] [facility], [place] - [address] [place] [address]    Clinical concerns: 6 month follow up Erythrocytosis, Anemia.       Janice Gonzalez, [facility]            "

## 2019-03-29 NOTE — PROGRESS NOTES
ADDENDUM 4/3/2019:  Pt walked into [facility] after her appointment with Kd Morris PH.D. Pt asked if she should have an INR drawn today or come back at her next appointment with Arturo on the . Reviewed chart and informed pt that per last appointment, she is to have an INR check on the . Informed her that since she has been having weekly INR appointments prior, she should stay with the 12th or sooner.  Patient verbalizes understanding and agrees to plan. No further questions or concerns.  Jaleesa Coronado RN on 4/3/2019 at 10:20 AM    ANTICOAGULATION FOLLOW-UP CLINIC VISIT    Patient Name:  Bia Bill  Date:  3/29/2019  Contact Type:  Telephone/ Left DVM    SUBJECTIVE:     Patient Findings     Positives:   Extra doses    Comments:   Writer left DVM.  Patient will continue weekly maintenance dose of 80 mg. INR is therapeutic.   Recheck in 2 weeks.            OBJECTIVE    INR   Date Value Ref Range Status   2019 2.76 (H) 0.86 - 1.14 Final       ASSESSMENT / PLAN  INR assessment THER    Recheck INR In: 2 WEEKS    INR Location Outside lab      Anticoagulation Summary  As of 3/29/2019    INR goal:   2.0-2.5   TTR:   47.8 % (4 y)   INR used for dosin.76! (3/29/2019)   Warfarin maintenance plan:   10 mg (5 mg x 2) every Mon, Wed, Fri; 12.5 mg (5 mg x 2.5) all other days   Full warfarin instructions:   10 mg every Mon, Wed, Fri; 12.5 mg all other days   Weekly warfarin total:   80 mg   No change documented:   Reina Forrest RN   Plan last modified:   Reina Forrest RN (3/21/2019)   Next INR check:   2019   Priority:   INR   Target end date:   Indefinite    Indications    PVD (peripheral vascular disease) with claudication (H) [I73.9]  Long term current use of anticoagulant therapy [Z79.01]  DVT (deep venous thrombosis) (H) [I82.409]  Stroke (H) [I63.9]             Anticoagulation Episode Summary     INR check location:       Preferred lab:       Send INR reminders to:   [place] PHONE  ANTICOAG POOL    Comments:   * lab draw due to elevated hematocrit (fingerstick meters don't work). LEFT LE. STENT x 3 LEFT UPPER LEG. Previous arterial clot. New goal range 2-2.5 starting 11/6/17.      Anticoagulation Care Providers     Provider Role Specialty Phone number    Kd Leong MD [facility] [phone]            See the Encounter Report to view Anticoagulation Flowsheet and Dosing Calendar (Go to Encounters tab in chart review, and find the Anticoagulation Therapy Visit)        Reina Forrest RN

## 2019-04-02 NOTE — ASSESSMENT & PLAN NOTE
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 She was previously seen by Dr. Lomax on 10/09/2011, but has been lost from followup. The patient has had erythrocytosis with a hemoglobin around 20-23 and hemoglobin and hematocrit of 60-65% for most of her life. She had a previous workup by Dr. Lomax and also by another hematologist at [facility]. Review of her previous tests and workup showed a hemoglobin electrophoresis on 01/17/2007 that showed a hemoglobin A1 of 52%, A2 at 0, hemoglobin F at 1.4% and other hemoglobin 46.2%. JAK2 was negative on 07/31/2007. Hemoglobin affinity testing on 07/31/2007 showed a P50 of 27 (normal between 25 and 29). Repeat hemoglobin electrophoresis on 10/04/2011 showed the hemoglobin was at 39.9%. This illuminated hemoglobin was suggestive of beta globin variant of hemoglobin Hb Beulah. She was also seen by Dr. Peres in 2007 and most of the workup is from there. Erythropoietin level was in the range of the 80s. Vitamin B12 and folic acid were within normal limits. Abdomen ultrasound did not show hepatosplenomegaly, but there is fatty infiltration of the liver. According to the chart, the patient had phlebotomies done in the past to keep her hemoglobin below 20 g/dl. This was done over the summertime of 2011. Most recently she didn't have any phlebotomy. She was started on anticoagulation with Coumadin as she was felt to be at high risk for blood clots and given history of severe arterial disease and stroke in the past. The patient is safe to use her anticoagulation and her most recent INR was 1.74. Evidently her carbon monoxide is also elevated. She is an active smoker, but she is cutting down from 2 packs per day to  3 cigarettes a day over the last one month. Patient denies any DVT or PE. She also evidently claims that her mother and her first son are also having the same hemoglobinopathy and are being followed by their physicians. Bone marrow biopsy on 11/17/2014 showed no clonal chromosomal abnormality and no cytogenetic evidence of a malignant process. She also tested negative for the JAK2 mutation. Leukemia lymphoma evaluation additionally showed no aberrant immunophenotype on T cells and no increase in or abnormal appearing myeloid blasts.

## 2019-04-02 NOTE — PROGRESS NOTES
Hematology/ Oncology Follow-up Visit:  Mar 29, 2019    Reason for Visit:   Chief Complaint   Patient presents with     Hematology     6 month follow up Erythrocytosis, Anemia.        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 She was previously seen by Dr. Lomax on 10/09/2011, but has been lost from followup. The patient has had erythrocytosis with a hemoglobin around 20-23 and hemoglobin and hematocrit of 60-65% for most of her life. She had a previous workup by Dr. Lomax and also by another hematologist at [facility]. Review of her previous tests and workup showed a hemoglobin electrophoresis on 01/17/2007 that showed a hemoglobin A1 of 52%, A2 at 0, hemoglobin F at 1.4% and other hemoglobin 46.2%. JAK2 was negative on 07/31/2007. Hemoglobin affinity testing on 07/31/2007 showed a P50 of 27 (normal between 25 and 29). Repeat hemoglobin electrophoresis on 10/04/2011 showed the hemoglobin was at 39.9%. This illuminated hemoglobin was suggestive of beta globin variant of hemoglobin Hb [place]. She was also seen by Dr. Peres in 2007 and most of the workup is from there. Erythropoietin level was in the range of the 80s. Vitamin B12 and folic acid were within normal limits. Abdomen ultrasound did not show hepatosplenomegaly, but there is fatty infiltration of the liver. According to the chart, the patient had phlebotomies done in the past to keep her hemoglobin below 20 g/dl. This was done over the summertime of 2011. Most recently she didn't have any phlebotomy. She was started on anticoagulation with Coumadin as she was felt to be at high risk for blood clots and given history of severe arterial disease and  stroke in the past. The patient is safe to use her anticoagulation and her most recent INR was 1.74. Evidently her carbon monoxide is also elevated. She is an active smoker, but she is cutting down from 2 packs per day to 3 cigarettes a day over the last one month. Patient denies any DVT or PE. She also evidently claims that her mother and her first son are also having the same hemoglobinopathy and are being followed by their physicians. Bone marrow biopsy on 11/17/2014 showed no clonal chromosomal abnormality and no cytogenetic evidence of a malignant process. She also tested negative for the JAK2 mutation. Leukemia lymphoma evaluation additionally showed no aberrant immunophenotype on T cells and no increase in or abnormal appearing myeloid blasts.       Interval History:  Patient returning today for follow-up visit.  She has been feeling well without recent complaints weight loss night sweats fever chills patient denies any nausea vomiting or diarrhea.  She denies any bone aches or pains.    Review Of Systems:  Constitutional: Negative for fever, chills, and night sweats.  Skin: negative.  Eyes: negative.  Ears/Nose/Throat: negative.  Respiratory: No shortness of breath, dyspnea on exertion, cough, or hemoptysis.  Cardiovascular: negative.  Gastrointestinal: negative.  Genitourinary: negative.  Musculoskeletal: negative.  Neurologic: negative.  Psychiatric: negative.  Hematologic/Lymphatic/Immunologic: negative.  Endocrine: negative.    All other ROS negative unless mentioned in interval history.    Past medical, social, surgical, and family histories reviewed.    Allergies:  Allergies as of 03/29/2019 - Reviewed 03/29/2019   Allergen Reaction Noted     Darvocet [propoxyphene n-apap] Anaphylaxis 07/26/2006     Percocet [oxycodone-acetaminophen] Anaphylaxis, Hives, and Swelling 06/01/2016     Asa [aspirin] Hives 07/10/2006     Bee  03/26/2010     Ibuprofen Swelling 03/30/2018     Lyrica [pregabalin] Other (See  Comments) and Swelling 12/04/2015     Povidone iodine Rash 01/25/2017     Tape [adhesive tape] Rash 08/03/2006       Current Medications:  Current Outpatient Medications   Medication Sig Dispense Refill     acetaminophen (TYLENOL) 500 MG tablet Take 500-1,000 mg by mouth every 6 hours as needed for mild pain       albuterol (2.5 MG/3ML) 0.083% neb solution Take 1 vial (2.5 mg) by nebulization every 6 hours as needed for shortness of breath / dyspnea or wheezing 1 Box 0     albuterol (PROAIR HFA/PROVENTIL HFA/VENTOLIN HFA) 108 (90 BASE) MCG/ACT Inhaler Inhale 2 puffs into the lungs every 6 hours as needed for shortness of breath / dyspnea or wheezing 3 Inhaler 1     allopurinol (ZYLOPRIM) 300 MG tablet TAKE ONE TABLET BY MOUTH ONE TIME DAILY 90 tablet 3     ARIPiprazole (ABILIFY) 10 MG tablet TAKE ONE TABLET BY MOUTH EVERY DAY 90 tablet 1     buPROPion (WELLBUTRIN XL) 150 MG 24 hr tablet TAKE ONE TABLET BY MOUTH EVERY DAY IN THE MORNING. 30 tablet 5     enoxaparin (LOVENOX) 100 MG/ML syringe Inject 1 mL (100 mg) Subcutaneous every 12 hours Do NOT use within 24 hours before or after procedure 10 Syringe 0     furosemide (LASIX) 20 MG tablet TAKE TWO TABLETS BY MOUTH TWICE DAILY 360 tablet 3     gabapentin (NEURONTIN) 300 MG capsule ONE CAPSULE BY MOUTH AT BEDTIME WITH 600 MG CAPSULE- FOR TOTAL  MG AT BEDTIME 90 capsule 2     gabapentin (NEURONTIN) 600 MG tablet TAKE ONE TABLET BY MOUTH THREE TIMES A DAY 90 tablet 0     HYDROcodone-acetaminophen (NORCO) 5-325 MG tablet Take 1-2 tablets by mouth every 6 hours as needed for pain 30 tablet 0     ipratropium - albuterol 0.5 mg/2.5 mg/3 mL (DUONEB) 0.5-2.5 (3) MG/3ML neb solution Take 1 vial (3 mLs) by nebulization every 6 hours as needed for shortness of breath / dyspnea or wheezing 30 vial 1     lamoTRIgine (LAMICTAL) 200 MG tablet Take 1 tablet (200 mg) by mouth daily 90 tablet 0     levothyroxine (SYNTHROID/LEVOTHROID) 150 MCG tablet Take 1 tablet (150 mcg) by  "mouth daily 90 tablet 2     lidocaine (XYLOCAINE) 5 % ointment Apply topically 3 times daily as needed for moderate pain 240 g 1     mometasone-formoterol (DULERA) 200-5 MCG/ACT oral inhaler Inhale 2 puffs into the lungs 2 times daily 13 g 1     morphine (MSIR) 15 MG IR tablet Take 1 tablet (15 mg) by mouth every 6 hours as needed for severe pain 8 tablet 0     omeprazole (PRILOSEC) 20 MG CR capsule TAKE ONE CAPSULE BY MOUTH TWICE DAILY 180 capsule 3     order for DME Equipment being ordered:x2 [employer] 30/40mmHg compression wraps with x2 extra prs of compression liners 1 each 0     order for DME Equipment being ordered: Nebulizer 1 Device 0     order for DME Equipment being ordered: CPAP  AIRSENSE 10  5-18 CM H20  SN# [id number]   DN# [id number]       order for DME Equipment being ordered: [id number]\" x2pair     \"20-30mmHg [place] Hybrid Liners\" x 2 pair 2 Units 11     order for DME Equipment being ordered: DEPENDS SIZE LARGE 60 Month 5     order for DME Equipment being ordered: INCONTINENCE PADS   QID 1 Month 11     potassium chloride SA (K-DUR/KLOR-CON M) 10 MEQ CR tablet TAKE ONE TABLET BY MOUTH ONE TIME DAILY 90 tablet 3     pramipexole (MIRAPEX) 0.125 MG tablet TAKE 1-2 TABLETS BY MOUTH AT BEDTIME 60 tablet 10     simvastatin (ZOCOR) 20 MG tablet TAKE ONE TABLET BY MOUTH AT BEDTIME 90 tablet 1     traZODone (DESYREL) 150 MG tablet Take 1 tablet (150 mg) by mouth At Bedtime 90 tablet 1     VITAMIN D3 1000 units tablet TAKE ONE TABLET BY MOUTH ONE TIME DAILY 30 tablet 2     warfarin (COUMADIN) 5 MG tablet 10 mg (5 mg x 2) every Mon, Wed, Fri; 12.5 mg (5 mg x 2.5) all other days or As directed by Anticoagulation clinic 200 tablet 0     EPINEPHrine (EPIPEN/ADRENACLICK/OR ANY BX GENERIC EQUIV) 0.3 MG/0.3ML injection 2-pack Inject 0.3 mLs (0.3 mg) into the muscle once as needed for anaphylaxis (Patient not taking: Reported on 3/29/2019) 0.6 mL 0        Physical Exam:  /52 (BP Location: Right arm, Patient Position: " "Sitting, Cuff Size: Adult Large)   Pulse 85   Temp 98.9  F (37.2  C) (Tympanic)   Resp 18   Ht 1.473 m (4' 10\")   Wt 107.9 kg (237 lb 12.8 oz)   LMP 09/27/2009   SpO2 94%   Breastfeeding? No   BMI 49.70 kg/m    Wt Readings from Last 12 Encounters:   03/29/19 107.9 kg (237 lb 12.8 oz)   01/11/19 105.7 kg (233 lb)   01/03/19 105.7 kg (233 lb)   12/19/18 104.8 kg (231 lb)   12/18/18 101.2 kg (223 lb)   10/03/18 105.1 kg (231 lb 9.6 oz)   09/28/18 104.6 kg (230 lb 9.6 oz)   09/12/18 105.6 kg (232 lb 12.8 oz)   09/06/18 106.1 kg (234 lb)   08/22/18 106.1 kg (234 lb)   08/15/18 105.1 kg (231 lb 9.6 oz)   07/10/18 104.3 kg (230 lb)     ECOG performance status: `1  GENERAL APPEARANCE: Healthy, alert and in no acute distress.  HEENT: Sclerae anicteric. PERRLA. Oropharynx without ulcers, lesions, or thrush.  NECK: Supple. No asymmetry or masses.  LYMPHATICS: No palpable cervical, supraclavicular, axillary, or inguinal lymphadenopathy.  RESP: Lungs clear to auscultation bilaterally without rales, rhonchi or wheezes.  CARDIOVASCULAR: Regular rate and rhythm. Normal S1, S2; no S3 or S4. No murmur, gallop, or rub.  ABDOMEN: Soft, nontender. Bowel sounds normal. No palpable organomegaly or masses.  MUSCULOSKELETAL: Extremities without gross deformities noted. No edema of bilateral lower extremities.  SKIN: No suspicious lesions or rashes.  NEURO: Alert and oriented x 3. Cranial nerves II-XII grossly intact.  PSYCHIATRIC: Mentation and affect appear normal.    Laboratory/Imaging Studies:  Orders Only on 03/29/2019   Component Date Value Ref Range Status     WBC 03/29/2019 5.2  4.0 - 11.0 10e9/L Final     RBC Count 03/29/2019 6.57* 3.8 - 5.2 10e12/L Final     Hemoglobin 03/29/2019 19.1* 11.7 - 15.7 g/dL Final     Hematocrit 03/29/2019 61.8* 35.0 - 47.0 % Final     MCV 03/29/2019 94  78 - 100 fl Final     MCH 03/29/2019 29.1  26.5 - 33.0 pg Final     MCHC 03/29/2019 30.9* 31.5 - 36.5 g/dL Final     RDW 03/29/2019 21.1* 10.0 " - 15.0 % Final     Platelet Count 03/29/2019 112* 150 - 450 10e9/L Final     Diff Method 03/29/2019 Automated Method   Final     % Neutrophils 03/29/2019 64.4  % Final     % Lymphocytes 03/29/2019 27.3  % Final     % Monocytes 03/29/2019 5.8  % Final     % Eosinophils 03/29/2019 1.7  % Final     % Basophils 03/29/2019 0.4  % Final     % Immature Granulocytes 03/29/2019 0.4  % Final     Nucleated RBCs 03/29/2019 0  0 /100 Final     Absolute Neutrophil 03/29/2019 3.3  1.6 - 8.3 10e9/L Final     Absolute Lymphocytes 03/29/2019 1.4  0.8 - 5.3 10e9/L Final     Absolute Monocytes 03/29/2019 0.3  0.0 - 1.3 10e9/L Final     Absolute Eosinophils 03/29/2019 0.1  0.0 - 0.7 10e9/L Final     Absolute Basophils 03/29/2019 0.0  0.0 - 0.2 10e9/L Final     Abs Immature Granulocytes 03/29/2019 0.0  0 - 0.4 10e9/L Final     Absolute Nucleated RBC 03/29/2019 0.0   Final     INR 03/29/2019 Canceled, Test credited  0.86 - 1.14 Corrected    Comment: High HCT, plasma/anticoagulant ratio incorrect  CORRECTED ON 03/29 AT 1424: PREVIOUSLY REPORTED AS 2.81     Orders Only on 03/20/2019   Component Date Value Ref Range Status     Hematocrit 03/20/2019 61.2* 35.0 - 47.0 % Final     INR 03/20/2019 1.82* 0.86 - 1.14 Final    Special tube used to correct for high hematocrit          Assessment and plan:    (I82.4Y9) Deep vein thrombosis (DVT) of proximal lower extremity, unspecified chronicity, unspecified laterality (H)  (primary encounter diagnosis)  Patient continues on warfarin.  INR has been monitored through the anticoagulation clinic    (D75.1) Erythrocytosis  I reviewed with patient most today most recent laboratory test.  There is no indication for phlebotomy at this time.    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  Diagnosis and treatment plans were explained to the patient. The patient expressed understanding of the content. The patient asked appropriate questions. The patient questions were answered to her satisfaction.    Chart documentation with Dragon Voice recognition Software. Although reviewed after completion, some words and grammatical errors may remain.

## 2019-04-03 ENCOUNTER — OFFICE VISIT (OUTPATIENT)
Dept: PSYCHOLOGY | Facility: CLINIC | Age: 54
End: 2019-04-03
Payer: COMMERCIAL

## 2019-04-03 DIAGNOSIS — F31.62 MODERATE MIXED BIPOLAR I DISORDER (H): Primary | ICD-10-CM

## 2019-04-03 PROCEDURE — 90834 PSYTX W PT 45 MINUTES: CPT | Performed by: PSYCHOLOGIST

## 2019-04-04 ASSESSMENT — ANXIETY QUESTIONNAIRES
3. WORRYING TOO MUCH ABOUT DIFFERENT THINGS: MORE THAN HALF THE DAYS
1. FEELING NERVOUS, ANXIOUS, OR ON EDGE: MORE THAN HALF THE DAYS
GAD7 TOTAL SCORE: 11
5. BEING SO RESTLESS THAT IT IS HARD TO SIT STILL: SEVERAL DAYS
6. BECOMING EASILY ANNOYED OR IRRITABLE: NEARLY EVERY DAY
4. TROUBLE RELAXING: MORE THAN HALF THE DAYS
2. NOT BEING ABLE TO STOP OR CONTROL WORRYING: SEVERAL DAYS
7. FEELING AFRAID AS IF SOMETHING AWFUL MIGHT HAPPEN: NOT AT ALL

## 2019-04-04 ASSESSMENT — PATIENT HEALTH QUESTIONNAIRE - PHQ9: SUM OF ALL RESPONSES TO PHQ QUESTIONS 1-9: 10

## 2019-04-04 NOTE — PROGRESS NOTES
Progress Note  Disclaimer: This note consists of symbols derived from keyboarding, dictation and/or voice recognition software. As a result, there may be errors in the script that have gone undetected. Please consider this when interpreting information found in this chart.    Client Name: Bia Bill  Date: 4/3/2019         Service Type: Individual      Session Start Time: 9:30 AM    session End Time 10:15 AM    session Length: 45     Session #: 46     Attendees: Client attended alone    Treatment Plan Last Reviewed: 4/3/2019  PHQ-9 / CHIN-7 : See flowsheet     DATA  Client reports she reported her daughter to CPS and her grandchild is now in protective custody and will be going to the child's grandfather, clients ex.  She feels this is a good arrangement and safe.  Client reported that after her phone report to child school also noticed bruises and took immediate action.  Client reports she is maintaining her sobriety despite repeated attempts by her daughter and her sister to have her join them in their alcohol abuse.  Congratulated client on sticking to her guns and doing a really good thing for her grandchild.   Progress Since Last Session (Related to Symptoms / Goals / Homework):   Symptoms: Stable    Homework: Achieved / completed to satisfaction      Episode of Care Goals: Satisfactory progress - ACTION (Actively working towards change); Intervened by reinforcing change plan / affirming steps taken     Current / Ongoing Stressors and Concerns:   Recurrent depression  family relational problems, medical problems, chronic pain, trauma history      Treatment Objective(s) Addressed in This Session:   Increase interest, engagement, and pleasure in doing things  Decrease frequency and intensity of feeling down, depressed, hopeless       Intervention:   CBT: Behavioral activation supported client's effort at establishing maintaining healthy boundaries with her  family.  Reinforced client's care for her grandchildren     ASSESSMENT: Current Emotional / Mental Status (status of significant symptoms):   Risk status (Self / Other harm or suicidal ideation)   Client denies current fears or concerns for personal safety.   Client denies current or recent suicidal ideation or behaviors.   Client denies current or recent homicidal ideation or behaviors.   Client denies current or recent self injurious behavior or ideation.   Client denies other safety concerns.   A safety and risk management plan has not been developed at this time, however client was given the after-hours number / 911 should there be a change in any of these risk factors.     Appearance:   Appropriate    Eye Contact:   Good    Psychomotor Behavior: Normal    Attitude:   Cooperative    Orientation:   All   Speech    Rate / Production: Normal     Volume:  Normal    Mood:    Normal   Affect:    Appropriate    Thought Content:  Clear    Thought Form:  Coherent  Logical    Insight:    Good      Medication Review:   No changes to current psychiatric medication(s)     Medication Compliance:   Yes     Changes in Health Issues:   None reported     Chemical Use Review:   Substance Use: Chemical use reviewed, no active concerns identified      Tobacco Use: Client reports she is down to 1/2 pack/day from 2 packs/day.   Collateral Reports Completed:   Not Applicable    PLAN: (Client Tasks / Therapist Tasks / Other)  Client to continue With her self-care routine and maintenance of boundaries.    Practice one to 2 grounding techniques each day.   Client to maintain sobriety and contact with supportive sober others.  Kd Morris                                                         ________________________________________________________________________    Treatment Plan    Client's Name: Bia Bill  YOB: 1965    Date: 10/24/2017      DSM5 Diagnoses: (Sustained by DSM5 Criteria Listed  Above)  Diagnoses: 296.40 Bipolar I Disorder Current or Most Recent Episode Manic, Unspecified  Psychosocial & Contextual Factors: financial difficulties, chronic pain, roommate issues  WHODAS 2.0 (12 item)               This questionnaire asks about difficulties due to health conditions. Health conditions             include disease or illnesses, other health problems that may be short or long lasting,             injuries, mental health or emotional problems, and problems with alcohol or drugs.                         Think back over the past 30 days and answer these questions, thinking about how much             difficulty you had doing the following activities. For each question, please [place] only             one response.      S1  Standing for long periods such as 30 minutes?  Mild =           2    S2  Taking care of household responsibilities?  Moderate =   3    S3  Learning a new task, for example, learning how to get to a new place?  Mild =           2    S4  How much of a problem do you have joining community activities (for example, festivals, [employer] or other activities) in the same way as anyone else can?  Moderate =   3    S5  How much have you been emotionally affected by your health problems?  Mild =           2        In the past 30 days, how much difficulty did you have in:    S6  Concentrating on doing something for ten minutes?  Mild =           2    S7  Walking a long distance such as a kilometer (or equivalent)?  Severe =       4    S8  Washing your whole body?  None =         1    S9  Getting dressed?  None =         1    S10  Dealing with people you do not know?  Moderate =   3    S11  Maintaining a friendship?  Severe =       4    S12  Your day to day work?  Moderate =   3       H1  Overall, in the past 30 days, how many days were these difficulties present?  Record number of days seven    H2  In the past 30 days, for how many days were you totally unable to carry out your usual activities  or work because of any health condition?  Record number of days  seven    H3  In the past 30 days, not counting the days that you were totally unable, for how many days did you cut back or reduce your usual activities or work because of any health condition?  Record number of days five                   Referral / Collaboration:  Referral to another professional/service is not indicated at this time..    Anticipated number of session or this episode of care: 15    Goals  1. Education- the Biopsychosocial model of depression  a. Client will be able to describe how depression is effecting them physically, emotionally and socially  2. Behavioral Activation  a. Client will learn to assess their depression on a day to day basis  b. Client will Identify two forms of exercise/activity and engage in them 3 times per week  c. Client will Identify 3 things that make them laugh, and engage in these 5 times per week.  d. Client will Identify 1-2Creative activities or hobbies  and engage in them 2 times per week  e. Client will identify music, movies, books that make them feel good and use them 3-4 times per week  3. Self-care  a. Client will identify 5 things they can do just for themselves  b. Client will take time for quiet, reflection, meditation 5 times per week  c. Client will Learn to set boundaries when appropriate  d. Client will Identify 2 individuals they can call on for support, distraction  4. Assessment of progress  a. Client will engage in assessment of their progress on a regular basis    Bipolar Disorder  Treatment plan:  1. Education- the Biopsychosocial model of Bipolar Disorder    a. Client will be able to describe in general terms what Bipolar Disorder  is and is not  b. Client will be able to describe how Bipolar Disorder  has affected their life in at least two different areas, such as school or work and Home/relationships  c. Clients parents/guardians or significant others will be provided information on  what Bipolar Disorder  is and the ways it can affect relationships and be encouraged to be a part of clients treatment team.  2. Medication  a. Client will participate in medication evaluation for Bipolar Disorder  symptoms and follow medication recommendations.   3. Identification and management of triggers  a. Client and therapist will examine patient s history to determine if there are predictable triggers for manic or depressive episodes (e.g. boredom, anger, family stress)  b. Client and therapist will develop means of diffusing these triggers (e.g. relaxation strategies, boundary setting, anger management)  4. Comorbid conditions   a. Client and therapist will asses for comorbid conditions (e.g. anxiety, depression, substance use). and add additional items to treatment plan as needed  5. Self-care  a. Client will identify 3 things they can do just for themselves  b. Client will take time for quiet, reflection, meditation 3 times per week  c. Client will Learn to set boundaries when appropriate  d. Client will Identify 2 individuals they can call on for support, distraction  5. Behavioral Activation  a. Client will Identify two forms of exercise/activity and engage in them 3 times per week  b. Client will Identify 2 things that make them laugh, and engage in these 3 times per week.  c. Client will Identify 2 Creative activities or hobbies  and engage in them 2 times per week  d. Client will identify music, movies, books that make them feel good and use them 3 times per week  6. Assessment of progress  a. Client will engage in assessment of their progress on a regular basis    Client has reviewed and agreed to the above plan.      Kd Morris  4/3/2018

## 2019-04-05 ASSESSMENT — ANXIETY QUESTIONNAIRES: GAD7 TOTAL SCORE: 11

## 2019-04-12 ENCOUNTER — ANTICOAGULATION THERAPY VISIT (OUTPATIENT)
Dept: ANTICOAGULATION | Facility: CLINIC | Age: 54
End: 2019-04-12

## 2019-04-12 DIAGNOSIS — Z86.73 HISTORY OF STROKE: ICD-10-CM

## 2019-04-12 DIAGNOSIS — Z79.01 LONG TERM CURRENT USE OF ANTICOAGULANT THERAPY: ICD-10-CM

## 2019-04-12 DIAGNOSIS — I63.9 STROKE (H): ICD-10-CM

## 2019-04-12 DIAGNOSIS — I82.409 DVT (DEEP VENOUS THROMBOSIS) (H): ICD-10-CM

## 2019-04-12 DIAGNOSIS — I73.9 PVD (PERIPHERAL VASCULAR DISEASE) WITH CLAUDICATION (H): ICD-10-CM

## 2019-04-12 DIAGNOSIS — I82.4Y9 DEEP VEIN THROMBOSIS (DVT) OF PROXIMAL LOWER EXTREMITY, UNSPECIFIED CHRONICITY, UNSPECIFIED LATERALITY (H): Chronic | ICD-10-CM

## 2019-04-12 DIAGNOSIS — I82.4Y9 DEEP VEIN THROMBOSIS (DVT) OF PROXIMAL LOWER EXTREMITY, UNSPECIFIED CHRONICITY, UNSPECIFIED LATERALITY (H): ICD-10-CM

## 2019-04-12 LAB
BASOPHILS # BLD AUTO: 0 10E9/L (ref 0–0.2)
BASOPHILS NFR BLD AUTO: 0.5 %
DIFFERENTIAL METHOD BLD: ABNORMAL
EOSINOPHIL # BLD AUTO: 0.1 10E9/L (ref 0–0.7)
EOSINOPHIL NFR BLD AUTO: 1.4 %
ERYTHROCYTE [DISTWIDTH] IN BLOOD BY AUTOMATED COUNT: 20.7 % (ref 10–15)
HCT VFR BLD AUTO: 62.7 % (ref 35–47)
HGB BLD-MCNC: 19.4 G/DL (ref 11.7–15.7)
IMM GRANULOCYTES # BLD: 0 10E9/L (ref 0–0.4)
IMM GRANULOCYTES NFR BLD: 0.2 %
INR PPP: 3.28 (ref 0.86–1.14)
LYMPHOCYTES # BLD AUTO: 1.5 10E9/L (ref 0.8–5.3)
LYMPHOCYTES NFR BLD AUTO: 26.8 %
MCH RBC QN AUTO: 29 PG (ref 26.5–33)
MCHC RBC AUTO-ENTMCNC: 30.9 G/DL (ref 31.5–36.5)
MCV RBC AUTO: 94 FL (ref 78–100)
MONOCYTES # BLD AUTO: 0.3 10E9/L (ref 0–1.3)
MONOCYTES NFR BLD AUTO: 5.7 %
NEUTROPHILS # BLD AUTO: 3.7 10E9/L (ref 1.6–8.3)
NEUTROPHILS NFR BLD AUTO: 65.4 %
NRBC # BLD AUTO: 0 10*3/UL
NRBC BLD AUTO-RTO: 0 /100
PLATELET # BLD AUTO: 106 10E9/L (ref 150–450)
RBC # BLD AUTO: 6.69 10E12/L (ref 3.8–5.2)
WBC # BLD AUTO: 5.6 10E9/L (ref 4–11)

## 2019-04-12 PROCEDURE — 85610 PROTHROMBIN TIME: CPT | Performed by: INTERNAL MEDICINE

## 2019-04-12 PROCEDURE — 85025 COMPLETE CBC W/AUTO DIFF WBC: CPT | Performed by: INTERNAL MEDICINE

## 2019-04-12 PROCEDURE — 99207 ZZC NO CHARGE NURSE ONLY: CPT

## 2019-04-12 PROCEDURE — 36415 COLL VENOUS BLD VENIPUNCTURE: CPT | Performed by: INTERNAL MEDICINE

## 2019-04-12 NOTE — PROGRESS NOTES
ANTICOAGULATION FOLLOW-UP CLINIC VISIT    Patient Name:  Bia Bill  Date:  4/12/2019  Contact Type:  Telephone    SUBJECTIVE:     Patient Findings     Comments:   No changes in medications, activity, or diet noted. No bleeding or increased bruising noted. Took warfarin as prescribed.  Patient will continue on maintenance dose. Patient will have a salad 2 x week.  Recheck in 3 weeks.  Patient verbalizes understanding and agrees to plan. No further questions or concerns.             OBJECTIVE    INR   Date Value Ref Range Status   04/12/2019 3.28 (H) 0.86 - 1.14 Final     Comment:     Special tube used to correct for high hematocrit       ASSESSMENT / PLAN  INR assessment SUPRA    Recheck INR In: 3 WEEKS    INR Location Outside lab      Anticoagulation Summary  As of 4/12/2019    INR goal:   2.0-2.5   TTR:   47.4 % (4 y)   INR used for dosing:   3.28! (4/12/2019)   Warfarin maintenance plan:   10 mg (5 mg x 2) every Mon, Wed, Fri; 12.5 mg (5 mg x 2.5) all other days   Full warfarin instructions:   10 mg every Mon, Wed, Fri; 12.5 mg all other days   Weekly warfarin total:   80 mg   Plan last modified:   Reina Forrest RN (3/21/2019)   Next INR check:   5/2/2019   Priority:   INR   Target end date:   Indefinite    Indications    PVD (peripheral vascular disease) with claudication (H) [I73.9]  Long term current use of anticoagulant therapy [Z79.01]  DVT (deep venous thrombosis) (H) [I82.409]  Stroke (H) [I63.9]             Anticoagulation Episode Summary     INR check location:       Preferred lab:       Send INR reminders to:   [place] [facility]    Comments:   * lab draw due to elevated hematocrit (fingerstick meters don't work). LEFT LE. STENT x 3 LEFT UPPER LEG. Previous arterial clot. New goal range 2-2.5 starting 11/6/17.      Anticoagulation Care Providers     Provider Role Specialty Phone number    Kd Leong MD [facility] [phone]            See the Encounter Report  to view Anticoagulation Flowsheet and Dosing Calendar (Go to Encounters tab in chart review, and find the Anticoagulation Therapy Visit)        Reina Forrest RN

## 2019-04-22 DIAGNOSIS — G63 POLYNEUROPATHY IN OTHER DISEASES CLASSIFIED ELSEWHERE (H): Chronic | ICD-10-CM

## 2019-04-22 RX ORDER — GABAPENTIN 600 MG/1
TABLET ORAL
Qty: 90 TABLET | Refills: 0 | Status: SHIPPED | OUTPATIENT
Start: 2019-04-22 | End: 2019-05-22

## 2019-04-22 NOTE — TELEPHONE ENCOUNTER
GABAPENTIN 600MG TABS      Last Written Prescription Date:  3/23/19  Last Fill Quantity: 90,   # refills: 0  Last Office Visit: 1/3/19  Future Office visit:    Next 5 appointments (look out 90 days)    May 02, 2019 10:30 AM CDT  Return Visit with Kd Morris  [facility] ([facility]) [address] [address]  [phone]           Routing refill request to provider for review/approval because:  Drug not on the [facility], [facility] or  Health refill protocol or controlled substance

## 2019-05-02 ENCOUNTER — ANTICOAGULATION THERAPY VISIT (OUTPATIENT)
Dept: ANTICOAGULATION | Facility: CLINIC | Age: 54
End: 2019-05-02

## 2019-05-02 ENCOUNTER — OFFICE VISIT (OUTPATIENT)
Dept: PSYCHOLOGY | Facility: CLINIC | Age: 54
End: 2019-05-02
Payer: COMMERCIAL

## 2019-05-02 DIAGNOSIS — I82.409 DVT (DEEP VENOUS THROMBOSIS) (H): ICD-10-CM

## 2019-05-02 DIAGNOSIS — Z79.01 LONG TERM CURRENT USE OF ANTICOAGULANT THERAPY: ICD-10-CM

## 2019-05-02 DIAGNOSIS — I73.9 PVD (PERIPHERAL VASCULAR DISEASE) WITH CLAUDICATION (H): ICD-10-CM

## 2019-05-02 DIAGNOSIS — F31.62 MODERATE MIXED BIPOLAR I DISORDER (H): Primary | ICD-10-CM

## 2019-05-02 DIAGNOSIS — I82.4Y9 DEEP VEIN THROMBOSIS (DVT) OF PROXIMAL LOWER EXTREMITY, UNSPECIFIED CHRONICITY, UNSPECIFIED LATERALITY (H): ICD-10-CM

## 2019-05-02 DIAGNOSIS — Z86.73 HISTORY OF STROKE: ICD-10-CM

## 2019-05-02 DIAGNOSIS — I82.4Y9 DEEP VEIN THROMBOSIS (DVT) OF PROXIMAL LOWER EXTREMITY, UNSPECIFIED CHRONICITY, UNSPECIFIED LATERALITY (H): Chronic | ICD-10-CM

## 2019-05-02 DIAGNOSIS — I63.9 STROKE (H): ICD-10-CM

## 2019-05-02 LAB
BASOPHILS # BLD AUTO: 0 10E9/L (ref 0–0.2)
BASOPHILS NFR BLD AUTO: 0.4 %
DIFFERENTIAL METHOD BLD: ABNORMAL
EOSINOPHIL # BLD AUTO: 0.1 10E9/L (ref 0–0.7)
EOSINOPHIL NFR BLD AUTO: 1.1 %
ERYTHROCYTE [DISTWIDTH] IN BLOOD BY AUTOMATED COUNT: 20.9 % (ref 10–15)
HCT VFR BLD AUTO: 62.7 % (ref 35–47)
HGB BLD-MCNC: 19.6 G/DL (ref 11.7–15.7)
IMM GRANULOCYTES # BLD: 0 10E9/L (ref 0–0.4)
IMM GRANULOCYTES NFR BLD: 0.4 %
INR PPP: 3 (ref 0.86–1.14)
LYMPHOCYTES # BLD AUTO: 1.5 10E9/L (ref 0.8–5.3)
LYMPHOCYTES NFR BLD AUTO: 27.2 %
MCH RBC QN AUTO: 29.3 PG (ref 26.5–33)
MCHC RBC AUTO-ENTMCNC: 31.3 G/DL (ref 31.5–36.5)
MCV RBC AUTO: 94 FL (ref 78–100)
MONOCYTES # BLD AUTO: 0.3 10E9/L (ref 0–1.3)
MONOCYTES NFR BLD AUTO: 6.4 %
NEUTROPHILS # BLD AUTO: 3.5 10E9/L (ref 1.6–8.3)
NEUTROPHILS NFR BLD AUTO: 64.5 %
NRBC # BLD AUTO: 0 10*3/UL
NRBC BLD AUTO-RTO: 0 /100
PLATELET # BLD AUTO: 100 10E9/L (ref 150–450)
RBC # BLD AUTO: 6.68 10E12/L (ref 3.8–5.2)
WBC # BLD AUTO: 5.3 10E9/L (ref 4–11)

## 2019-05-02 PROCEDURE — 99207 ZZC NO CHARGE NURSE ONLY: CPT

## 2019-05-02 PROCEDURE — 85610 PROTHROMBIN TIME: CPT | Performed by: INTERNAL MEDICINE

## 2019-05-02 PROCEDURE — 90834 PSYTX W PT 45 MINUTES: CPT | Performed by: PSYCHOLOGIST

## 2019-05-02 PROCEDURE — 36415 COLL VENOUS BLD VENIPUNCTURE: CPT | Performed by: INTERNAL MEDICINE

## 2019-05-02 PROCEDURE — 85025 COMPLETE CBC W/AUTO DIFF WBC: CPT | Performed by: INTERNAL MEDICINE

## 2019-05-02 NOTE — PROGRESS NOTES
ANTICOAGULATION FOLLOW-UP CLINIC VISIT    Patient Name:  Bia Bill  Date:  5/2/2019  Contact Type:  Telephone/ Spoke to pt    SUBJECTIVE:     Patient Findings     Positives:   Extra doses, Bruising (Pt did bump her leg and her arm, and she does have a bruise. Advised pt to monitor it.)    Comments:   No changes in medications, activity, or diet noted. No bleeding noted.  Patient had 80 mg in the last 7 days, will adjust dose to 75 mg by next INR check in one week (~6.3% decrease).  Pt also states that she is going to eat a salad today.  Patient educated on the increased risk for bleeding, precautions to take, and when to seek medical attention.  Patient verbalizes understanding and agrees to plan. No further questions or concerns.           OBJECTIVE    INR   Date Value Ref Range Status   05/02/2019 3.00 (H) 0.86 - 1.14 Final     Comment:     Special tube used to correct for high hematocrit       ASSESSMENT / PLAN  INR assessment SUPRA    Recheck INR In: 1 WEEK    INR Location Outside lab      Anticoagulation Summary  As of 5/2/2019    INR goal:   2.0-2.5   TTR:   46.7 % (4.1 y)   INR used for dosing:   3.00! (5/2/2019)   Warfarin maintenance plan:   12.5 mg (5 mg x 2.5) every Mon, Fri; 10 mg (5 mg x 2) all other days   Full warfarin instructions:   12.5 mg every Mon, Fri; 10 mg all other days   Weekly warfarin total:   75 mg   Plan last modified:   Jaleesa Coronado, RN (5/2/2019)   Next INR check:   5/9/2019   Priority:   INR   Target end date:   Indefinite    Indications    PVD (peripheral vascular disease) with claudication (H) [I73.9]  Long term current use of anticoagulant therapy [Z79.01]  DVT (deep venous thrombosis) (H) [I82.409]  Stroke (H) [I63.9]             Anticoagulation Episode Summary     INR check location:       Preferred lab:       Send INR reminders to:   [place] PHONE ANTICOAG POOL    Comments:   * lab draw due to elevated hematocrit (fingerstick meters don't work). LEFT LE. STENT x 3 LEFT  UPPER LEG. Previous arterial clot. New goal range 2-2.5 starting 11/6/17.      Anticoagulation Care Providers     Provider Role Specialty Phone number    Kd Leong MD [facility] [phone]            See the Encounter Report to view Anticoagulation Flowsheet and Dosing Calendar (Go to Encounters tab in chart review, and find the Anticoagulation Therapy Visit)        Jaleesa Coronado RN

## 2019-05-07 DIAGNOSIS — K21.9 GASTROESOPHAGEAL REFLUX DISEASE WITHOUT ESOPHAGITIS: ICD-10-CM

## 2019-05-07 NOTE — TELEPHONE ENCOUNTER
Prescription approved per [facility] Refill Protocol.  Princess Atkins RN

## 2019-05-07 NOTE — TELEPHONE ENCOUNTER
"omeprazole (PRILOSEC) 20 MG CR capsule      Last Written Prescription Date:  5/1/18  Last Fill Quantity: 180,   # refills: 3  Last Office Visit: 1/3/19  Future Office visit:    Next 5 appointments (look out 90 days)    May 20, 2019 12:00 PM CDT  Return Visit with Kd CAMMY [facility] ([facility]) [address] [address]  [phone]   Jun 03, 2019  9:30 AM CDT  Return Visit with Kd CHAWLA [facility] ([facility]) [address] [address]  [phone]           Requested Prescriptions   Pending Prescriptions Disp Refills     omeprazole (PRILOSEC) 20 MG DR capsule [Pharmacy Med Name: OMEPRAZOLE 20MG CPDR] 180 capsule 0     Sig: TAKE ONE CAPSULE BY MOUTH TWICE A DAY       PPI Protocol Passed - 5/7/2019  9:58 AM        Passed - Not on Clopidogrel (unless Pantoprazole ordered)        Passed - No diagnosis of osteoporosis on record        Passed - Recent (12 mo) or future (30 days) visit within the authorizing provider's specialty     Patient had office visit in the last 12 months or has a visit in the next 30 days with authorizing provider or within the authorizing provider's specialty.  See \"Patient Info\" tab in inbasket, or \"Choose Columns\" in Meds & Orders section of the refill encounter.              Passed - Medication is active on med list        Passed - Patient is age 18 or older        Passed - No active pregnacy on record        Passed - No positive pregnancy test in past 12 months          "

## 2019-05-09 ENCOUNTER — ANTICOAGULATION THERAPY VISIT (OUTPATIENT)
Dept: ANTICOAGULATION | Facility: CLINIC | Age: 54
End: 2019-05-09
Payer: COMMERCIAL

## 2019-05-09 DIAGNOSIS — I82.4Y9 DEEP VEIN THROMBOSIS (DVT) OF PROXIMAL LOWER EXTREMITY, UNSPECIFIED CHRONICITY, UNSPECIFIED LATERALITY (H): Chronic | ICD-10-CM

## 2019-05-09 DIAGNOSIS — Z86.73 HISTORY OF STROKE: ICD-10-CM

## 2019-05-09 DIAGNOSIS — I73.9 PVD (PERIPHERAL VASCULAR DISEASE) WITH CLAUDICATION (H): ICD-10-CM

## 2019-05-09 DIAGNOSIS — I63.9 STROKE (H): ICD-10-CM

## 2019-05-09 DIAGNOSIS — Z79.01 LONG TERM CURRENT USE OF ANTICOAGULANT THERAPY: ICD-10-CM

## 2019-05-09 DIAGNOSIS — I82.4Y9 DEEP VEIN THROMBOSIS (DVT) OF PROXIMAL LOWER EXTREMITY, UNSPECIFIED CHRONICITY, UNSPECIFIED LATERALITY (H): ICD-10-CM

## 2019-05-09 LAB
BASOPHILS # BLD AUTO: 0 10E9/L (ref 0–0.2)
BASOPHILS NFR BLD AUTO: 0.5 %
DIFFERENTIAL METHOD BLD: ABNORMAL
EOSINOPHIL # BLD AUTO: 0.1 10E9/L (ref 0–0.7)
EOSINOPHIL NFR BLD AUTO: 1 %
ERYTHROCYTE [DISTWIDTH] IN BLOOD BY AUTOMATED COUNT: 21.6 % (ref 10–15)
HCT VFR BLD AUTO: 57.5 % (ref 35–47)
HGB BLD-MCNC: 19 G/DL (ref 11.7–15.7)
INR PPP: 3.03 (ref 0.86–1.14)
LYMPHOCYTES # BLD AUTO: 1.5 10E9/L (ref 0.8–5.3)
LYMPHOCYTES NFR BLD AUTO: 23.6 %
MCH RBC QN AUTO: 30.4 PG (ref 26.5–33)
MCHC RBC AUTO-ENTMCNC: 33 G/DL (ref 31.5–36.5)
MCV RBC AUTO: 92 FL (ref 78–100)
MONOCYTES # BLD AUTO: 0.4 10E9/L (ref 0–1.3)
MONOCYTES NFR BLD AUTO: 6.6 %
NEUTROPHILS # BLD AUTO: 4.2 10E9/L (ref 1.6–8.3)
NEUTROPHILS NFR BLD AUTO: 68.3 %
PLATELET # BLD AUTO: 104 10E9/L (ref 150–450)
RBC # BLD AUTO: 6.24 10E12/L (ref 3.8–5.2)
WBC # BLD AUTO: 6.2 10E9/L (ref 4–11)

## 2019-05-09 PROCEDURE — 85610 PROTHROMBIN TIME: CPT | Performed by: INTERNAL MEDICINE

## 2019-05-09 PROCEDURE — 36415 COLL VENOUS BLD VENIPUNCTURE: CPT | Performed by: INTERNAL MEDICINE

## 2019-05-09 PROCEDURE — 99207 ZZC NO CHARGE NURSE ONLY: CPT

## 2019-05-09 PROCEDURE — 85025 COMPLETE CBC W/AUTO DIFF WBC: CPT | Performed by: INTERNAL MEDICINE

## 2019-05-09 ASSESSMENT — ANXIETY QUESTIONNAIRES
2. NOT BEING ABLE TO STOP OR CONTROL WORRYING: SEVERAL DAYS
6. BECOMING EASILY ANNOYED OR IRRITABLE: MORE THAN HALF THE DAYS
1. FEELING NERVOUS, ANXIOUS, OR ON EDGE: MORE THAN HALF THE DAYS
4. TROUBLE RELAXING: MORE THAN HALF THE DAYS
3. WORRYING TOO MUCH ABOUT DIFFERENT THINGS: SEVERAL DAYS
GAD7 TOTAL SCORE: 9
7. FEELING AFRAID AS IF SOMETHING AWFUL MIGHT HAPPEN: NOT AT ALL
5. BEING SO RESTLESS THAT IT IS HARD TO SIT STILL: SEVERAL DAYS

## 2019-05-09 ASSESSMENT — PATIENT HEALTH QUESTIONNAIRE - PHQ9: SUM OF ALL RESPONSES TO PHQ QUESTIONS 1-9: 9

## 2019-05-09 NOTE — PROGRESS NOTES
Progress Note  Disclaimer: This note consists of symbols derived from keyboarding, dictation and/or voice recognition software. As a result, there may be errors in the script that have gone undetected. Please consider this when interpreting information found in this chart.    Client Name: Bia Bill  Date: 5/2/2019         Service Type: Individual      Session Start Time: 10:30 AM   session End Time  11:15 AM    session Length: 45     Session #: 47     Attendees: Client attended alone    Treatment Plan Last Reviewed: 4/3/2019  PHQ-9 / CHIN-7 : See flowsheet     DATA  Client reports she is down to 1 cigarette/day.  She is re-engaging in coping.  She notes that her family are beginning to respect the boundaries she has spent so much time trying to put in place.  Feeling a little more irritable lately, principally due to needing to find a new living place.  Hers is in extremely poor condition.   Progress Since Last Session (Related to Symptoms / Goals / Homework):   Symptoms: Stable    Homework: Achieved / completed to satisfaction      Episode of Care Goals: Satisfactory progress - ACTION (Actively working towards change); Intervened by reinforcing change plan / affirming steps taken     Current / Ongoing Stressors and Concerns:   Recurrent depression  family relational problems, medical problems, chronic pain, trauma history      Treatment Objective(s) Addressed in This Session:   Increase interest, engagement, and pleasure in doing things  Decrease frequency and intensity of feeling down, depressed, hopeless       Intervention:   CBT: Behavioral activation supported client's effort at establishing maintaining healthy boundaries with her family.  Reinforced client's care for her grandchildren     ASSESSMENT: Current Emotional / Mental Status (status of significant symptoms):   Risk status (Self / Other harm or suicidal ideation)   Client denies current fears or  concerns for personal safety.   Client denies current or recent suicidal ideation or behaviors.   Client denies current or recent homicidal ideation or behaviors.   Client denies current or recent self injurious behavior or ideation.   Client denies other safety concerns.   A safety and risk management plan has not been developed at this time, however client was given the after-hours number / 911 should there be a change in any of these risk factors.     Appearance:   Appropriate    Eye Contact:   Good    Psychomotor Behavior: Normal    Attitude:   Cooperative    Orientation:   All   Speech    Rate / Production: Normal     Volume:  Normal    Mood:    Normal   Affect:    Appropriate    Thought Content:  Clear    Thought Form:  Coherent  Logical    Insight:    Good      Medication Review:   No changes to current psychiatric medication(s)     Medication Compliance:   Yes     Changes in Health Issues:   None reported     Chemical Use Review:   Substance Use: Chemical use reviewed, no active concerns identified      Tobacco Use: Client reports she is down to 1 cigarette per day from 2 packs/day.   Collateral Reports Completed:   Not Applicable    PLAN: (Client Tasks / Therapist Tasks / Other)  Client to continue With her self-care routine and maintenance of boundaries.    Practice one to 2 grounding techniques each day.   Client to maintain sobriety and contact with supportive sober others.  Kd Morris                                                         ________________________________________________________________________    Treatment Plan    Client's Name: Bia Bill  YOB: 1965    Date: 10/24/2017      DSM5 Diagnoses: (Sustained by DSM5 Criteria Listed Above)  Diagnoses: 296.40 Bipolar I Disorder Current or Most Recent Episode Manic, Unspecified  Psychosocial & Contextual Factors: financial difficulties, chronic pain, roommate issues  WHODAS 2.0 (12 item)               This  questionnaire asks about difficulties due to health conditions. Health conditions             include disease or illnesses, other health problems that may be short or long lasting,             injuries, mental health or emotional problems, and problems with alcohol or drugs.                         Think back over the past 30 days and answer these questions, thinking about how much             difficulty you had doing the following activities. For each question, please [place] only             one response.      S1  Standing for long periods such as 30 minutes?  Mild =           2    S2  Taking care of household responsibilities?  Moderate =   3    S3  Learning a new task, for example, learning how to get to a new place?  Mild =           2    S4  How much of a problem do you have joining community activities (for example, festivals, Voodoo or other activities) in the same way as anyone else can?  Moderate =   3    S5  How much have you been emotionally affected by your health problems?  Mild =           2        In the past 30 days, how much difficulty did you have in:    S6  Concentrating on doing something for ten minutes?  Mild =           2    S7  Walking a long distance such as a kilometer (or equivalent)?  Severe =       4    S8  Washing your whole body?  None =         1    S9  Getting dressed?  None =         1    S10  Dealing with people you do not know?  Moderate =   3    S11  Maintaining a friendship?  Severe =       4    S12  Your day to day work?  Moderate =   3       H1  Overall, in the past 30 days, how many days were these difficulties present?  Record number of days seven    H2  In the past 30 days, for how many days were you totally unable to carry out your usual activities or work because of any health condition?  Record number of days  seven    H3  In the past 30 days, not counting the days that you were totally unable, for how many days did you cut back or reduce your usual activities or work  because of any health condition?  Record number of days five                   Referral / Collaboration:  Referral to another professional/service is not indicated at this time..    Anticipated number of session or this episode of care: 15    Goals  1. Education- the Biopsychosocial model of depression  a. Client will be able to describe how depression is effecting them physically, emotionally and socially  2. Behavioral Activation  a. Client will learn to assess their depression on a day to day basis  b. Client will Identify two forms of exercise/activity and engage in them 3 times per week  c. Client will Identify 3 things that make them laugh, and engage in these 5 times per week.  d. Client will Identify 1-2Creative activities or hobbies  and engage in them 2 times per week  e. Client will identify music, movies, books that make them feel good and use them 3-4 times per week  3. Self-care  a. Client will identify 5 things they can do just for themselves  b. Client will take time for quiet, reflection, meditation 5 times per week  c. Client will Learn to set boundaries when appropriate  d. Client will Identify 2 individuals they can call on for support, distraction  4. Assessment of progress  a. Client will engage in assessment of their progress on a regular basis    Bipolar Disorder  Treatment plan:  1. Education- the Biopsychosocial model of Bipolar Disorder    a. Client will be able to describe in general terms what Bipolar Disorder  is and is not  b. Client will be able to describe how Bipolar Disorder  has affected their life in at least two different areas, such as school or work and Home/relationships  c. Clients parents/guardians or significant others will be provided information on what Bipolar Disorder  is and the ways it can affect relationships and be encouraged to be a part of clients treatment team.  2. Medication  a. Client will participate in medication evaluation for Bipolar Disorder  symptoms  and follow medication recommendations.   3. Identification and management of triggers  a. Client and therapist will examine patient s history to determine if there are predictable triggers for manic or depressive episodes (e.g. boredom, anger, family stress)  b. Client and therapist will develop means of diffusing these triggers (e.g. relaxation strategies, boundary setting, anger management)  4. Comorbid conditions   a. Client and therapist will asses for comorbid conditions (e.g. anxiety, depression, substance use). and add additional items to treatment plan as needed  5. Self-care  a. Client will identify 3 things they can do just for themselves  b. Client will take time for quiet, reflection, meditation 3 times per week  c. Client will Learn to set boundaries when appropriate  d. Client will Identify 2 individuals they can call on for support, distraction  5. Behavioral Activation  a. Client will Identify two forms of exercise/activity and engage in them 3 times per week  b. Client will Identify 2 things that make them laugh, and engage in these 3 times per week.  c. Client will Identify 2 Creative activities or hobbies  and engage in them 2 times per week  d. Client will identify music, movies, books that make them feel good and use them 3 times per week  6. Assessment of progress  a. Client will engage in assessment of their progress on a regular basis    Client has reviewed and agreed to the above plan.      Kd Morris  4/3/2018

## 2019-05-09 NOTE — PROGRESS NOTES
VM left for pt to return call to [facility] [phone]. Dosing instructions not given to pt.  Pt's dose was previously decreased - did pt follow new plan?  Jaleesa Coronado RN on 5/9/2019 at 4:02 PM

## 2019-05-10 ASSESSMENT — ANXIETY QUESTIONNAIRES: GAD7 TOTAL SCORE: 9

## 2019-05-10 NOTE — PROGRESS NOTES
ANTICOAGULATION FOLLOW-UP CLINIC VISIT    Patient Name:  Bia Bill  Date:  5/10/2019  Contact Type:  Telephone    SUBJECTIVE:  Patient Findings     Positives:   Missed doses (Missed her dose on Sunday)    Comments:   Patient states she did start taking the lower warfarin dose of 12.5mg Mon/Fri, 10mg ROW. She states she missed her warfarin dose and did not make up for it. She has only had 65mg in the previous week due to that missed dose and her INR is still above her goal range. No issues with bleeding or unusual bruising noted.     Will decrease maintenance dose due to missed warfarin doses and continued supratherapeutic INR. She will recheck her INR at the lab on Monday, May 20th.         Clinical Outcomes     Comments:   Patient states she did start taking the lower warfarin dose of 12.5mg Mon/Fri, 10mg ROW. She states she missed her warfarin dose and did not make up for it. She has only had 65mg in the previous week due to that missed dose and her INR is still above her goal range. No issues with bleeding or unusual bruising noted.     Will decrease maintenance dose due to missed warfarin doses and continued supratherapeutic INR. She will recheck her INR at the lab on Monday, May 20th.            OBJECTIVE    INR   Date Value Ref Range Status   05/09/2019 3.03 (H) 0.86 - 1.14 Final     Comment:     Special tube used to correct for high hematocrit       ASSESSMENT / PLAN  INR assessment SUPRA    Recheck INR In: 10 DAYS    INR Location Outside lab      Anticoagulation Summary  As of 5/9/2019    INR goal:   2.0-2.5   TTR:   46.5 % (4.1 y)   INR used for dosing:   3.03! (5/9/2019)   Warfarin maintenance plan:   7.5 mg (5 mg x 1.5) every Mon, Fri; 10 mg (5 mg x 2) all other days   Full warfarin instructions:   7.5 mg every Mon, Fri; 10 mg all other days   Weekly warfarin total:   65 mg   Plan last modified:   Angelina Nj RN (5/10/2019)   Next INR check:   5/20/2019   Priority:   INR   Target end  date:       Indications    PVD (peripheral vascular disease) with claudication (H) [I73.9]  Long term current use of anticoagulant therapy [Z79.01]  DVT (deep venous thrombosis) (H) [I82.409]  Stroke (H) [I63.9]             Anticoagulation Episode Summary     INR check location:       Preferred lab:       Send INR reminders to:   JEROMY SIMPSON TVTY POOL    Comments:   * lab draw due to elevated hematocrit (fingerstick meters don't work). LEFT LE. STENT x 3 LEFT UPPER LEG. Previous arterial clot. New goal range 2-2.5 starting 11/6/17.      Anticoagulation Care Providers     Provider Role Specialty Phone number    Kd Leong MD [facility] [phone]            See the Encounter Report to view Anticoagulation Flowsheet and Dosing Calendar (Go to Encounters tab in chart review, and find the Anticoagulation Therapy Visit)        [facility]j RN [facility]

## 2019-05-10 NOTE — PROGRESS NOTES
Patient called [facility] back after hours at 5:06 pm on 5-9-10. Writer called patient again at 9:00 am on 5-10-19 and left another message to call us back when able.    Need to verify the dosing she took this last week.    Mireille Torres RN, BSN, PHN  5-

## 2019-05-20 ENCOUNTER — ANTICOAGULATION THERAPY VISIT (OUTPATIENT)
Dept: ANTICOAGULATION | Facility: CLINIC | Age: 54
End: 2019-05-20

## 2019-05-20 ENCOUNTER — OFFICE VISIT (OUTPATIENT)
Dept: PSYCHOLOGY | Facility: CLINIC | Age: 54
End: 2019-05-20
Payer: COMMERCIAL

## 2019-05-20 ENCOUNTER — TELEPHONE (OUTPATIENT)
Dept: ONCOLOGY | Facility: CLINIC | Age: 54
End: 2019-05-20

## 2019-05-20 DIAGNOSIS — I63.9 STROKE (H): ICD-10-CM

## 2019-05-20 DIAGNOSIS — I82.409 DVT (DEEP VENOUS THROMBOSIS) (H): ICD-10-CM

## 2019-05-20 DIAGNOSIS — Z79.01 LONG TERM CURRENT USE OF ANTICOAGULANT THERAPY: ICD-10-CM

## 2019-05-20 DIAGNOSIS — I73.9 PVD (PERIPHERAL VASCULAR DISEASE) WITH CLAUDICATION (H): ICD-10-CM

## 2019-05-20 DIAGNOSIS — F31.62 MODERATE MIXED BIPOLAR I DISORDER (H): Primary | ICD-10-CM

## 2019-05-20 DIAGNOSIS — I82.4Y9 DEEP VEIN THROMBOSIS (DVT) OF PROXIMAL LOWER EXTREMITY, UNSPECIFIED CHRONICITY, UNSPECIFIED LATERALITY (H): Chronic | ICD-10-CM

## 2019-05-20 DIAGNOSIS — G63 POLYNEUROPATHY IN OTHER DISEASES CLASSIFIED ELSEWHERE (H): Chronic | ICD-10-CM

## 2019-05-20 DIAGNOSIS — F33.1 MAJOR DEPRESSIVE DISORDER, RECURRENT EPISODE, MODERATE (H): ICD-10-CM

## 2019-05-20 DIAGNOSIS — I89.0 LYMPHEDEMA OF BOTH LOWER EXTREMITIES: ICD-10-CM

## 2019-05-20 DIAGNOSIS — I82.4Y9 DEEP VEIN THROMBOSIS (DVT) OF PROXIMAL LOWER EXTREMITY, UNSPECIFIED CHRONICITY, UNSPECIFIED LATERALITY (H): ICD-10-CM

## 2019-05-20 DIAGNOSIS — Z86.73 HISTORY OF STROKE: ICD-10-CM

## 2019-05-20 LAB
BASOPHILS # BLD AUTO: 0.1 10E9/L (ref 0–0.2)
BASOPHILS NFR BLD AUTO: 0.7 %
DIFFERENTIAL METHOD BLD: ABNORMAL
EOSINOPHIL # BLD AUTO: 0.1 10E9/L (ref 0–0.7)
EOSINOPHIL NFR BLD AUTO: 1.1 %
ERYTHROCYTE [DISTWIDTH] IN BLOOD BY AUTOMATED COUNT: 20.9 % (ref 10–15)
HCT VFR BLD AUTO: 64.5 % (ref 35–47)
HGB BLD-MCNC: 19.7 G/DL (ref 11.7–15.7)
IMM GRANULOCYTES # BLD: 0 10E9/L (ref 0–0.4)
IMM GRANULOCYTES NFR BLD: 0.6 %
INR PPP: 2.05 (ref 0.86–1.14)
LYMPHOCYTES # BLD AUTO: 1.8 10E9/L (ref 0.8–5.3)
LYMPHOCYTES NFR BLD AUTO: 24.4 %
MCH RBC QN AUTO: 29.2 PG (ref 26.5–33)
MCHC RBC AUTO-ENTMCNC: 30.5 G/DL (ref 31.5–36.5)
MCV RBC AUTO: 96 FL (ref 78–100)
MONOCYTES # BLD AUTO: 0.5 10E9/L (ref 0–1.3)
MONOCYTES NFR BLD AUTO: 6.4 %
NEUTROPHILS # BLD AUTO: 4.8 10E9/L (ref 1.6–8.3)
NEUTROPHILS NFR BLD AUTO: 66.8 %
NRBC # BLD AUTO: 0.1 10*3/UL
NRBC BLD AUTO-RTO: 1 /100
PLATELET # BLD AUTO: 119 10E9/L (ref 150–450)
RBC # BLD AUTO: 6.75 10E12/L (ref 3.8–5.2)
WBC # BLD AUTO: 7.2 10E9/L (ref 4–11)

## 2019-05-20 PROCEDURE — 36415 COLL VENOUS BLD VENIPUNCTURE: CPT | Performed by: INTERNAL MEDICINE

## 2019-05-20 PROCEDURE — 85025 COMPLETE CBC W/AUTO DIFF WBC: CPT | Performed by: INTERNAL MEDICINE

## 2019-05-20 PROCEDURE — 90834 PSYTX W PT 45 MINUTES: CPT | Performed by: PSYCHOLOGIST

## 2019-05-20 PROCEDURE — 99207 ZZC NO CHARGE NURSE ONLY: CPT

## 2019-05-20 PROCEDURE — 85610 PROTHROMBIN TIME: CPT | Performed by: INTERNAL MEDICINE

## 2019-05-20 RX ORDER — LAMOTRIGINE 200 MG/1
TABLET ORAL
Qty: 90 TABLET | Refills: 0 | Status: SHIPPED | OUTPATIENT
Start: 2019-05-20 | End: 2019-05-22

## 2019-05-20 NOTE — TELEPHONE ENCOUNTER
"LAMOTRIGINE 200MG TABS      Last Written Prescription Date:  2/22/19  Last Fill Quantity: 90,   # refills: 0  Last Office Visit: 1/3/19  Future Office visit:    Next 5 appointments (look out 90 days)    May 20, 2019 12:00 PM CDT  Return Visit with Kd Morris  [facility] ([facility]) [address] [address]  [phone]   May 22, 2019 10:00 AM CDT  SHORT with Kd Leong MD  [facility] [address] [facility] [address]  [phone]   Jun 03, 2019  9:30 AM CDT  Return Visit with dK Morris  [facility] ([facility]) [address] [address]  [phone]           Requested Prescriptions   Pending Prescriptions Disp Refills     lamoTRIgine (LAMICTAL) 200 MG tablet [Pharmacy Med Name: LAMOTRIGINE 200MG TABS] 90 tablet 0     Sig: TAKE ONE TABLET BY MOUTH ONCE DAILY       Anti-Seizure Meds Protocol  Failed - 5/20/2019  8:58 AM        Failed - Review Authorizing provider's last note.      Refer to last progress notes: confirm request is for original authorizing provider (cannot be through other providers).          Failed - Normal CBC on file in past 26 months     Recent Labs   Lab Test 05/09/19  1328   WBC 6.2   RBC 6.24*   HGB 19.0*   HCT 57.5*   *                 Failed - Normal platelet count on file in past 26 months     Recent Labs   Lab Test 05/09/19  1328   *               Passed - Recent (12 mo) or future (30 days) visit within the authorizing provider's specialty     Patient had office visit in the last 12 months or has a visit in the next 30 days with authorizing provider or within the authorizing provider's specialty.  See \"Patient Info\" tab in inbasket, or \"Choose Columns\" in Meds & Orders section of the refill encounter.              Passed - Normal ALT or AST on file in past 26 months     Recent Labs   Lab Test 12/18/18  0109   ALT 27 "     Recent Labs   Lab Test 12/18/18  0109   AST 22             Passed - Medication is active on med list        Passed - No active pregnancy on record        Passed - No positive pregnancy test in last 12 months

## 2019-05-20 NOTE — TELEPHONE ENCOUNTER
Routing refill request to provider for review/approval because:  Labs out of range:    Princess Atkins RN

## 2019-05-20 NOTE — PROGRESS NOTES
ANTICOAGULATION FOLLOW-UP CLINIC VISIT    Patient Name:  Bia Bill  Date:  2019  Contact Type:  Telephone    SUBJECTIVE:  Patient Findings     Comments:   No changes in medications, diet, or activity noted. Took warfarin as instructed, denies any missed doses. No issues with bleeding or unusual bruising noted.    Will continue same warfarin dose and recheck in 2 weeks.         Clinical Outcomes     Negatives:   Major bleeding event, Thromboembolic event, Anticoagulation-related hospital admission, Anticoagulation-related ED visit, Anticoagulation-related fatality    Comments:   No changes in medications, diet, or activity noted. Took warfarin as instructed, denies any missed doses. No issues with bleeding or unusual bruising noted.    Will continue same warfarin dose and recheck in 2 weeks.            OBJECTIVE    INR   Date Value Ref Range Status   2019 2.05 (H) 0.86 - 1.14 Final     Hematocrit   Date Value Ref Range Status   2019 64.5 (H) 35.0 - 47.0 % Final     Writer called lab to verify the citrate corrected lab tube was used as it was not specified in their notes like usual. Lab stated, yes the special tube was used.    ASSESSMENT / PLAN  No question data found.  Anticoagulation Summary  As of 2019    INR goal:   2.0-2.5   TTR:   46.5 % (4.1 y)   INR used for dosin.05 (2019)   Warfarin maintenance plan:   7.5 mg (5 mg x 1.5) every Mon, Fri; 10 mg (5 mg x 2) all other days   Full warfarin instructions:   7.5 mg every Mon, Fri; 10 mg all other days   Weekly warfarin total:   65 mg   No change documented:   Angelina Nj RN   Plan last modified:   Angelina Nj RN (5/10/2019)   Next INR check:   6/3/2019   Priority:   INR   Target end date:   Indefinite    Indications    PVD (peripheral vascular disease) with claudication (H) [I73.9]  Long term current use of anticoagulant therapy [Z79.01]  DVT (deep venous thrombosis) (H) [I82.409]  Stroke (H) [I63.9]              Anticoagulation Episode Summary     INR check location:       Preferred lab:       Send INR reminders to:   [place] PHONE ANTICOAG POOL    Comments:   * lab draw due to elevated hematocrit (fingerstick meters don't work). LEFT LE. STENT x 3 LEFT UPPER LEG. Previous arterial clot. New goal range 2-2.5 starting 11/6/17.      Anticoagulation Care Providers     Provider Role Specialty Phone number    Kd Leong MD [facility] [phone]            See the Encounter Report to view Anticoagulation Flowsheet and Dosing Calendar (Go to Encounters tab in chart review, and find the Anticoagulation Therapy Visit)        Angelina Nj RN CACP

## 2019-05-20 NOTE — TELEPHONE ENCOUNTER
VITAMIN D3 1000UNIT TABS      Last Written Prescription Date:  12/12/18  Last Fill Quantity: 30,   # refills: 2  Last Office Visit: 1/3/19  Future Office visit:    Next 5 appointments (look out 90 days)    May 20, 2019 12:00 PM CDT  Return Visit with Kd Morris  [facility] ([facility]) [address] [facility] [address]  [phone]   May 22, 2019 10:00 AM CDT  SHORT with Kd Leong MD  [facility] [address] [facility] [address]  [phone]   Jun 03, 2019  9:30 AM CDT  Return Visit with Kd Morris  [facility] ([facility]) [address] [facility] [address]  [phone]           gabapentin (NEURONTIN) 300 MG capsule      Last Written Prescription Date:  5/31/18  Last Fill Quantity: 90,   # refills: 2  Last Office Visit: 1/3/19  Future Office visit:    Next 5 appointments (look out 90 days)    May 20, 2019 12:00 PM CDT  Return Visit with Kd Morris  [facility] ([facility]) [address] [facility] [address]  [phone]   May 22, 2019 10:00 AM CDT  SHORT with Kd Leong MD  [facility] ([facility]) [address] [facility] [address]  [phone]   Jun 03, 2019  9:30 AM CDT  Return Visit with Kd Morris  [facility] ([facility]) [address] [facility] [address]  [phone]           gabapentin (NEURONTIN) 600 MG tablet      Last Written Prescription Date:  4/22/19  Last Fill Quantity: 90,   # refills: 0  Last Office Visit: 1/3/19  Future Office visit:    Next 5 appointments (look out 90 days)    May 20, 2019 12:00 PM CDT  Return Visit with Kd Morris  [facility] ([facility]) [address] [facility] [address]  [phone]   May 22, 2019 10:00 AM CDT  SHORT with Kd Leong MD  [facility] [address]  "[facility]) [address] [facility] [address]  [phone]   Jun 03, 2019  9:30 AM CDT  Return Visit with Kd Morris  [facility] ([facility]) [address]  [facility] [address]  [phone]           Requested Prescriptions   Pending Prescriptions Disp Refills     VITAMIN D3 1000 units tablet [Pharmacy Med Name: VITAMIN D3 1000UNIT TABS] 100 tablet 0     Sig: TAKE ONE TABLET BY MOUTH ONCE DAILY       Vitamin Supplements (Adult) Protocol Passed - 5/20/2019  8:58 AM        Passed - High dose Vitamin D not ordered        Passed - Recent (12 mo) or future (30 days) visit within the authorizing provider's specialty     Patient had office visit in the last 12 months or has a visit in the next 30 days with authorizing provider or within the authorizing provider's specialty.  See \"Patient Info\" tab in inbasket, or \"Choose Columns\" in Meds & Orders section of the refill encounter.              Passed - Medication is active on med list        gabapentin (NEURONTIN) 300 MG capsule [Pharmacy Med Name: GABAPENTIN 300MG CAPS] 90 capsule 0     Sig: TAKE ONE CAPSULE BY MOUTH ONCE DAILY AT BEDTIME WITH A 600 MG TABLET FOR A TOTAL DOSE  MG       There is no refill protocol information for this order        gabapentin (NEURONTIN) 600 MG tablet [Pharmacy Med Name: GABAPENTIN 600MG TABS] 90 tablet 0     Sig: TAKE ONE TABLET BY MOUTH THREE TIMES A DAY       There is no refill protocol information for this order          "

## 2019-05-20 NOTE — TELEPHONE ENCOUNTER
----- Message from Kenya Olson sent at 5/1/2019  1:15 PM CDT -----  Regarding: RE: monthly lab appt  Called and left message to akil or call us and we can help her or call who does her labs. Put note in account.     Goldie Mullins   ----- Message -----  From: Aubree Richardson RN  Sent: 5/1/2019  12:04 PM  To: [facility]   Subject: FW: monthly lab appt                             I still do not see anything scheduled for the patient. Please reach out to her when you are able to. Thank you.  Aubree Richardson RN    ----- Message -----  From: Aubree Richardson RN  Sent: 4/15/2019   9:16 AM  To: [facility]   Subject: monthly lab appt                                 Patient said she would like to schedule her own monthly lab appointments. I still do not see anything scheduled for April. Would you be able to reach out to her and see when she would like to have that scheduled? Thank you.  Aubree Richardson RN

## 2019-05-21 NOTE — TELEPHONE ENCOUNTER
Routing refill request to provider for review/approval because:  Please advise refill requests at her 5/22/19 office appointment. Thank you.  Mundo Conde RN

## 2019-05-22 ENCOUNTER — ANCILLARY PROCEDURE (OUTPATIENT)
Dept: GENERAL RADIOLOGY | Facility: CLINIC | Age: 54
End: 2019-05-22
Attending: FAMILY MEDICINE
Payer: COMMERCIAL

## 2019-05-22 ENCOUNTER — OFFICE VISIT (OUTPATIENT)
Dept: FAMILY MEDICINE | Facility: CLINIC | Age: 54
End: 2019-05-22
Payer: COMMERCIAL

## 2019-05-22 VITALS
HEIGHT: 58 IN | HEART RATE: 80 BPM | BODY MASS INDEX: 50.28 KG/M2 | WEIGHT: 239.5 LBS | RESPIRATION RATE: 28 BRPM | TEMPERATURE: 98.3 F | SYSTOLIC BLOOD PRESSURE: 125 MMHG | DIASTOLIC BLOOD PRESSURE: 57 MMHG

## 2019-05-22 DIAGNOSIS — R21 RASH AND NONSPECIFIC SKIN ERUPTION: ICD-10-CM

## 2019-05-22 DIAGNOSIS — G63 POLYNEUROPATHY IN OTHER DISEASES CLASSIFIED ELSEWHERE (H): Chronic | ICD-10-CM

## 2019-05-22 DIAGNOSIS — M10.9 ACUTE GOUTY ARTHRITIS: ICD-10-CM

## 2019-05-22 DIAGNOSIS — I89.0 LYMPHEDEMA OF BOTH LOWER EXTREMITIES: ICD-10-CM

## 2019-05-22 DIAGNOSIS — M16.51 POST-TRAUMATIC OSTEOARTHRITIS OF RIGHT HIP: ICD-10-CM

## 2019-05-22 DIAGNOSIS — M16.51 POST-TRAUMATIC OSTEOARTHRITIS OF RIGHT HIP: Primary | ICD-10-CM

## 2019-05-22 DIAGNOSIS — Z79.01 LONG TERM CURRENT USE OF ANTICOAGULANT THERAPY: ICD-10-CM

## 2019-05-22 DIAGNOSIS — F33.1 MAJOR DEPRESSIVE DISORDER, RECURRENT EPISODE, MODERATE (H): ICD-10-CM

## 2019-05-22 DIAGNOSIS — M54.41 ACUTE LEFT-SIDED LOW BACK PAIN WITH RIGHT-SIDED SCIATICA: ICD-10-CM

## 2019-05-22 PROCEDURE — 99214 OFFICE O/P EST MOD 30 MIN: CPT | Performed by: FAMILY MEDICINE

## 2019-05-22 PROCEDURE — 73502 X-RAY EXAM HIP UNI 2-3 VIEWS: CPT | Mod: FY

## 2019-05-22 RX ORDER — GABAPENTIN 600 MG/1
TABLET ORAL
Qty: 90 TABLET | Refills: 3 | Status: ON HOLD | OUTPATIENT
Start: 2019-05-22 | End: 2019-06-07

## 2019-05-22 RX ORDER — ALLOPURINOL 300 MG/1
1 TABLET ORAL DAILY
Qty: 90 TABLET | Refills: 3 | Status: SHIPPED | OUTPATIENT
Start: 2019-05-22 | End: 2020-05-26

## 2019-05-22 RX ORDER — CHOLECALCIFEROL (VITAMIN D3) 25 MCG
1000 TABLET ORAL DAILY
Qty: 30 TABLET | Refills: 2 | Status: CANCELLED | OUTPATIENT
Start: 2019-05-22

## 2019-05-22 RX ORDER — CHOLECALCIFEROL (VITAMIN D3) 25 MCG
TABLET ORAL
Qty: 100 TABLET | Refills: 0 | Status: SHIPPED | OUTPATIENT
Start: 2019-05-22 | End: 2019-09-10

## 2019-05-22 RX ORDER — LAMOTRIGINE 100 MG/1
100 TABLET ORAL DAILY
Qty: 30 TABLET | Refills: 0 | Status: SHIPPED | OUTPATIENT
Start: 2019-05-22 | End: 2019-06-12

## 2019-05-22 RX ORDER — GABAPENTIN 600 MG/1
TABLET ORAL
Qty: 90 TABLET | Refills: 0 | Status: ON HOLD | OUTPATIENT
Start: 2019-05-22 | End: 2019-10-31

## 2019-05-22 RX ORDER — GABAPENTIN 300 MG/1
CAPSULE ORAL
Qty: 90 CAPSULE | Refills: 3 | Status: ON HOLD | OUTPATIENT
Start: 2019-05-22 | End: 2019-06-07

## 2019-05-22 RX ORDER — LAMOTRIGINE 200 MG/1
200 TABLET ORAL DAILY
Qty: 90 TABLET | Refills: 0 | Status: CANCELLED | OUTPATIENT
Start: 2019-05-22

## 2019-05-22 RX ORDER — GABAPENTIN 300 MG/1
CAPSULE ORAL
Qty: 90 CAPSULE | Refills: 0 | Status: SHIPPED | OUTPATIENT
Start: 2019-05-22 | End: 2020-06-10

## 2019-05-22 ASSESSMENT — ASTHMA QUESTIONNAIRES
QUESTION_5 LAST FOUR WEEKS HOW WOULD YOU RATE YOUR ASTHMA CONTROL: SOMEWHAT CONTROLLED
QUESTION_4 LAST FOUR WEEKS HOW OFTEN HAVE YOU USED YOUR RESCUE INHALER OR NEBULIZER MEDICATION (SUCH AS ALBUTEROL): ONE OR TWO TIMES PER DAY
ACT_TOTALSCORE: 13
QUESTION_1 LAST FOUR WEEKS HOW MUCH OF THE TIME DID YOUR ASTHMA KEEP YOU FROM GETTING AS MUCH DONE AT WORK, SCHOOL OR AT HOME: A LITTLE OF THE TIME
QUESTION_3 LAST FOUR WEEKS HOW OFTEN DID YOUR ASTHMA SYMPTOMS (WHEEZING, COUGHING, SHORTNESS OF BREATH, CHEST TIGHTNESS OR PAIN) WAKE YOU UP AT NIGHT OR EARLIER THAN USUAL IN THE MORNING: TWO OR THREE NIGHTS A WEEK
QUESTION_2 LAST FOUR WEEKS HOW OFTEN HAVE YOU HAD SHORTNESS OF BREATH: ONCE A DAY

## 2019-05-22 ASSESSMENT — ANXIETY QUESTIONNAIRES
7. FEELING AFRAID AS IF SOMETHING AWFUL MIGHT HAPPEN: NOT AT ALL
1. FEELING NERVOUS, ANXIOUS, OR ON EDGE: MORE THAN HALF THE DAYS
GAD7 TOTAL SCORE: 10
4. TROUBLE RELAXING: MORE THAN HALF THE DAYS
3. WORRYING TOO MUCH ABOUT DIFFERENT THINGS: MORE THAN HALF THE DAYS
5. BEING SO RESTLESS THAT IT IS HARD TO SIT STILL: SEVERAL DAYS
2. NOT BEING ABLE TO STOP OR CONTROL WORRYING: SEVERAL DAYS
6. BECOMING EASILY ANNOYED OR IRRITABLE: MORE THAN HALF THE DAYS

## 2019-05-22 ASSESSMENT — PATIENT HEALTH QUESTIONNAIRE - PHQ9: SUM OF ALL RESPONSES TO PHQ QUESTIONS 1-9: 10

## 2019-05-22 ASSESSMENT — MIFFLIN-ST. JEOR: SCORE: 1586.11

## 2019-05-22 ASSESSMENT — PAIN SCALES - GENERAL: PAINLEVEL: WORST PAIN (10)

## 2019-05-22 NOTE — PROGRESS NOTES
SUBJECTIVE:                                                    Bia Bill is a 53 year old female who presents to clinic today for the following health issues:    Joint Pain    Onset: a week and a half    Description:   Location: right hip  Character: Sharp    Intensity: moderate to severe    Progression of Symptoms: worse    Accompanying Signs & Symptoms:  Other symptoms: radiation of pain to pelvic area    History:   Previous similar pain: no       Precipitating factors:   Trauma or overuse: YES- she feel three weeks ago and landed on her knee and kate her hip.     Alleviating factors:  Improved by: nothing    Therapies Tried and outcome: tylenol, heat, and ice. Nothing helps.     Problem list and histories reviewed & adjusted, as indicated.  Additional history:     Patient Active Problem List   Diagnosis     Mild persistent asthma     Polyneuropathy in other diseases classified elsewhere (H)     DVT (deep venous thrombosis) (H)     Alcohol abuse, in remission     SECONDARY POLYCYTHEMIA     Chondromalacia of patella     Sensorineural hearing loss, asymmetrical     Developmental reading disorder     Esophageal reflux     Hypothyroidism     Fatty liver     GILES (Obstructive Sleep Apnea)-Moderate (AHI 16)     RLS (restless legs syndrome)     Smoker     Erythrocytosis     Moderate mixed bipolar I disorder (H)     Personality disorder, depressive     COPD (chronic obstructive pulmonary disease) (H)     Current use of long term anticoagulation     Rosacea     Health Care Home     DDD (degenerative disc disease), cervical     Benign neoplasm of colon (POLYPOSIS)     Stroke (H)     Somatization disorder     Sebaceous cyst of right axilla     HTN, goal below 140/90     Left leg pain     Vitamin D deficiency     Long term current use of anticoagulant therapy     Morbid obesity (H)     PVD (peripheral vascular disease) with claudication (H)     Cyst of left ovary     Morbid obesity with alveolar hypoventilation (H)      GI bleed     Severe episode of recurrent major depressive disorder (H)     Conductive hearing loss of left ear with restricted hearing of right ear     Sensorineural hearing loss (SNHL) of right ear with restricted hearing of left ear     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     Past Surgical History:   Procedure Laterality Date     BILIARY STENT SINGLE USE COV      3 stints in left leg     BONE MARROW BIOPSY, BONE SPECIMEN, NEEDLE/TROCAR N/A 11/17/2014    Procedure: BIOPSY BONE MARROW;  Surgeon: Hay Aaron MD;  Location: [facility]     D & C  10/26/09    with uterine ablation     ESOPHAGOSCOPY, GASTROSCOPY, DUODENOSCOPY (EGD), COMBINED  4/21/2014    Procedure: Gastroscopy;  Surgeon: Moris Thomas MD;  Location: [facility]     HYSTERECTOMY, PAP NO LONGER INDICATED  1-4-2010     LAPAROSCOPIC CHOLECYSTECTOMY N/A 1/4/2019    Procedure: Laparoscopic cholecystectomy;  Surgeon: Aaron Siegel MD;  Location: [facility]     LITHOTRIPSY  2004    Lithotrypsy       Social History     Tobacco Use     Smoking status: Current Every Day Smoker     Packs/day: 0.25     Years: 34.00     Pack years: 8.50     Types: Cigarettes     Smokeless tobacco: Never Used     Tobacco comment: started at age 11.  Quit during pregnancyX4.  Quit 3 months ago.    Substance Use Topics     Alcohol use: No     Alcohol/week: 0.0 oz     Comment: quit 1995     Family History   Problem Relation Age of Onset     Hypertension Mother      Diabetes Mother      Blood Disease Mother      Heart Disease Mother         chf     Cerebrovascular Disease Mother      Hypertension Father      Cancer Father         lung cancer     Allergies Sister      Diabetes Sister      Depression Sister      Hypertension Sister          Current Outpatient Medications   Medication Sig Dispense Refill     acetaminophen (TYLENOL) 500 MG tablet Take 500-1,000 mg by mouth every 6 hours as needed for mild pain       albuterol (2.5  MG/3ML) 0.083% neb solution Take 1 vial (2.5 mg) by nebulization every 6 hours as needed for shortness of breath / dyspnea or wheezing 1 Box 0     albuterol (PROAIR HFA/PROVENTIL HFA/VENTOLIN HFA) 108 (90 BASE) MCG/ACT Inhaler Inhale 2 puffs into the lungs every 6 hours as needed for shortness of breath / dyspnea or wheezing 3 Inhaler 1     allopurinol (ZYLOPRIM) 300 MG tablet Take 1 tablet (300 mg) by mouth daily 90 tablet 3     ARIPiprazole (ABILIFY) 10 MG tablet TAKE ONE TABLET BY MOUTH EVERY DAY 90 tablet 1     buPROPion (WELLBUTRIN XL) 150 MG 24 hr tablet TAKE ONE TABLET BY MOUTH EVERY DAY IN THE MORNING. 30 tablet 5     furosemide (LASIX) 20 MG tablet TAKE TWO TABLETS BY MOUTH TWICE DAILY 360 tablet 3     gabapentin (NEURONTIN) 300 MG capsule ONE CAPSULE BY MOUTH AT BEDTIME WITH 600 MG CAPSULE- FOR TOTAL  MG AT BEDTIME 90 capsule 3     gabapentin (NEURONTIN) 600 MG tablet TAKE ONE TABLET BY MOUTH THREE TIMES A DAY 90 tablet 3     ipratropium - albuterol 0.5 mg/2.5 mg/3 mL (DUONEB) 0.5-2.5 (3) MG/3ML neb solution Take 1 vial (3 mLs) by nebulization every 6 hours as needed for shortness of breath / dyspnea or wheezing 30 vial 1     lamoTRIgine (LAMICTAL) 100 MG tablet Take 1 tablet (100 mg) by mouth daily 30 tablet 0     levothyroxine (SYNTHROID/LEVOTHROID) 150 MCG tablet Take 1 tablet (150 mcg) by mouth daily 90 tablet 2     lidocaine (XYLOCAINE) 5 % ointment Apply topically 3 times daily as needed for moderate pain 240 g 1     mometasone-formoterol (DULERA) 200-5 MCG/ACT oral inhaler Inhale 2 puffs into the lungs 2 times daily 13 g 1     morphine (MSIR) 15 MG IR tablet Take 1 tablet (15 mg) by mouth every 6 hours as needed for severe pain 8 tablet 0     naloxone (NARCAN) 4 MG/0.1ML nasal spray Spray 1 spray (4 mg) into one nostril alternating nostrils once as needed for opioid reversal every 2-3 minutes until assistance arrives 0.2 mL 1     omeprazole (PRILOSEC) 20 MG DR capsule TAKE ONE CAPSULE BY MOUTH  "TWICE A  capsule 1     order for DME Equipment being ordered:x2 [employer] 30/40mmHg compression wraps with x2 extra prs of compression liners 1 each 0     order for DME Equipment being ordered: Nebulizer 1 Device 0     order for DME Equipment being ordered: CPAP  AIRSENSE 10  5-18 CM H20  # [id number]   DN# [id number]       order for DME Equipment being ordered: [id number]\" x2pair     \"20-30mmHg [place] Hybrid Liners\" x 2 pair 2 Units 11     order for DME Equipment being ordered: DEPENDS SIZE LARGE 60 Month 5     order for DME Equipment being ordered: INCONTINENCE PADS   QID 1 Month 11     potassium chloride SA (K-DUR/KLOR-CON M) 10 MEQ CR tablet TAKE ONE TABLET BY MOUTH ONE TIME DAILY 90 tablet 3     pramipexole (MIRAPEX) 0.125 MG tablet TAKE 1-2 TABLETS BY MOUTH AT BEDTIME 60 tablet 10     simvastatin (ZOCOR) 20 MG tablet TAKE ONE TABLET BY MOUTH AT BEDTIME 90 tablet 1     traZODone (DESYREL) 150 MG tablet Take 1 tablet (150 mg) by mouth At Bedtime 90 tablet 1     VITAMIN D3 1000 units tablet TAKE ONE TABLET BY MOUTH ONE TIME DAILY 30 tablet 2     warfarin (COUMADIN) 5 MG tablet Take 7.5mg every Mon, Fri; 10 mg all other days or As directed by Anticoagulation clinic 200 tablet 0     EPINEPHrine (EPIPEN/ADRENACLICK/OR ANY BX GENERIC EQUIV) 0.3 MG/0.3ML injection 2-pack Inject 0.3 mLs (0.3 mg) into the muscle once as needed for anaphylaxis (Patient not taking: Reported on 3/29/2019) 0.6 mL 0     Allergies   Allergen Reactions     Darvocet [Propoxyphene N-Apap] Anaphylaxis     Darvocet,Percocet      Percocet [Oxycodone-Acetaminophen] Anaphylaxis, Hives and Swelling     Has tolerated hydromorphone in the past.      Asa [Aspirin] Hives     Aspirin causes seizures and hives, throat swelling.   Has tolerated ketorolac in the past.        Bee      Ibuprofen Swelling     Throat swelling per patient      Lyrica [Pregabalin] Other (See Comments) and Swelling     dizziness     Povidone Iodine Rash     Reaction to topical " "betadine     Tape [Adhesive Tape] Rash     Recent Labs   Lab Test 12/18/18  0109 08/25/18  2242 05/16/18  1043 05/02/18  1312 04/06/18  1316  03/15/17  1032  12/29/16  1415 10/26/16  1612  09/10/15  1332  07/10/14  0742   A1C  --   --   --   --   --   --   --   --  6.2*  --   --  5.8  --  5.4   LDL  --   --   --   --  101*  --  90  --   --  78  --  82  --  79   HDL  --   --   --   --  46*  --  30*  --   --  34*  --  27*  --  25*   TRIG  --   --   --   --  198*  --  172*  --   --  129  --  128  --  145   ALT 27  --  34 33  --    < >  --    < >  --   --    < >  --   --   --    CR 0.74 0.77 0.69 0.76  --    < >  --    < >  --   --    < > 0.87   < >  --    GFRESTIMATED 82 78 >90 80  --    < >  --    < >  --   --    < > 69   < >  --    GFRESTBLACK >90 >90 >90 >90  --    < >  --    < >  --   --    < > 84   < >  --    POTASSIUM 3.6 3.5 3.5 3.8  --    < >  --    < >  --   --    < > 3.8   < >  --    TSH  --   --   --   --  1.07  --  2.79  --   --   --    < >  --    < >  --     < > = values in this interval not displayed.      Skin rash  Has diffuse rash no scaling not peeling on both lower extremities  appeas to have developed after starting narcotic.  Has been on lamictal 200mg for 3 years initially starttred by her psychiatrist, has been maintaine d on this dos by me.       ROS:  Constitutional, HEENT, cardiovascular, pulmonary, gi and gu systems are negative, except as otherwise noted.    OBJECTIVE:                                                    /57 (BP Location: Right arm, Patient Position: Sitting, Cuff Size: Adult Large)   Pulse 80   Temp 98.3  F (36.8  C) (Tympanic)   Resp 28   Ht 1.473 m (4' 10\")   Wt 108.6 kg (239 lb 8 oz)   LMP 09/27/2009   BMI 50.06 kg/m   Body mass index is 50.06 kg/m .   GENERAL: healthy, alert, well nourished, well hydrated, no distress  HENT: ear canals- normal; TMs- normal; Nose- normal; Mouth- no ulcers, no lesions  NECK: no tenderness, no adenopathy, no asymmetry, no " masses, no stiffness; thyroid- normal to palpation  RESP: lungs clear to auscultation - no rales, no rhonchi, no wheezes  CV: regular rates and rhythm, normal S1 S2, no S3 or S4 and no murmur, no click or rub -  ABDOMEN: soft, no tenderness, no  hepatosplenomegaly, no masses, normal bowel sounds  Skin: diffuse rash both loer extremeitie worse around hair folicles.  Not peeling no sloughing,  Not panful    Back:  Straight leg raise positive on right.  Has tightness and tenderness in perilumbar muscles more on rt than left.    ASSESSMENT/PLAN:                                                    (R21) Rash and nonspecific skin eruption  Does not appear consistent with butcher - johnsons type rash from lamictal, but is on a higher does  As a precaution will decrease her from 200mg a day to 100mg a day.  mpore likely this is a druyg reaction from new     (M16.51) Post-traumatic osteoarthritis of right hip  (primary encounter diagnosis)  Would avoid narcotics.  Ice physical therapy  Comment: I independently visualized the xray:    Plan: XR Hip Right 2-3 Views    (M54.41) Acute left-sided low back pain with right-sided sciatica  Comment: I think tis is more the cause of pain than the hip itself.   Plan: PHYSICAL THERAPY REFERRAL,      (M10.9) Acute gouty arthritis  Comment: Good control.  Continue currant medications, continue to monito   Plan: allopurinol (ZYLOPRIM) 300 MG tablet            (G63) Polyneuropathy in other diseases classified elsewhere (H)  Comment: Good control.  Continue currant medications, continue to monito   Om nhigh dose gabapentine.   Plan: gabapentin (NEURONTIN) 300 MG capsule,         gabapentin (NEURONTIN) 600 MG tablet, naloxone         (NARCAN) 4 MG/0.1ML nasal spray      (I89.0) Lymphedema of both lower extremities  Good control.  Continue currant medications, continue to monito     (F33.1) Major depressive disorder, recurrent episode, moderate (H)  Good control.  Continue currant medications,  continue to monito will monitor her after decrease in lamictal dose    (Z79.01) Long term current use of anticoagulant therapy   on warfarin  She had asked for refill of lovenox  I think this was a mistake.     (I82.409) DVT (deep venous thrombosis) (H)  As above.            reports that she has been smoking cigarettes.  She has a 8.50 pack-year smoking history. She has never used smokeless tobacco.  Tobacco Cessation Action Plan: Pharmacotherapies : cold turkey    Weight management plan: Discussed healthy diet and exercise guidelines      [facility]

## 2019-05-23 ASSESSMENT — ASTHMA QUESTIONNAIRES: ACT_TOTALSCORE: 13

## 2019-05-23 ASSESSMENT — ANXIETY QUESTIONNAIRES: GAD7 TOTAL SCORE: 10

## 2019-05-23 NOTE — PROGRESS NOTES
Progress Note  Disclaimer: This note consists of symbols derived from keyboarding, dictation and/or voice recognition software. As a result, there may be errors in the script that have gone undetected. Please consider this when interpreting information found in this chart.    Client Name: Bia Bill  Date: 5/20/2019         Service Type: Individual      Session Start Time: 12:00 PM   session End Time 12:45 PM    session Length: 45     Session #: 48     Attendees: Client attended alone    Treatment Plan Last Reviewed: 4/3/2019  PHQ-9 / CHIN-7 : See flowsheet     DATA  Client reports she recently had a fall and injured her hip.  She has not been to her physician but has an appointment on Wednesday.  She reported her daughter is now sober for 2 weeks and she hopes this can continue.  She has told her daughter she will not be visiting until she has some most of her   Progress Since Last Session (Related to Symptoms / Goals / Homework):   Symptoms: Stable    Homework: Achieved / completed to satisfaction      Episode of Care Goals: Satisfactory progress - ACTION (Actively working towards change); Intervened by reinforcing change plan / affirming steps taken     Current / Ongoing Stressors and Concerns:   Recurrent depression  family relational problems, medical problems, chronic pain, trauma history      Treatment Objective(s) Addressed in This Session:   Increase interest, engagement, and pleasure in doing things  Decrease frequency and intensity of feeling down, depressed, hopeless       Intervention:   CBT: Behavioral activation supported client's effort at establishing maintaining healthy boundaries with her family.  Reinforced client's care for her grandchildren     ASSESSMENT: Current Emotional / Mental Status (status of significant symptoms):   Risk status (Self / Other harm or suicidal ideation)   Client denies current fears or concerns for personal  safety.   Client denies current or recent suicidal ideation or behaviors.   Client denies current or recent homicidal ideation or behaviors.   Client denies current or recent self injurious behavior or ideation.   Client denies other safety concerns.   A safety and risk management plan has not been developed at this time, however client was given the after-hours number / 911 should there be a change in any of these risk factors.     Appearance:   Appropriate    Eye Contact:   Good    Psychomotor Behavior: Normal    Attitude:   Cooperative    Orientation:   All   Speech    Rate / Production: Normal     Volume:  Normal    Mood:    Normal   Affect:    Appropriate    Thought Content:  Clear    Thought Form:  Coherent  Logical    Insight:    Good      Medication Review:   No changes to current psychiatric medication(s)     Medication Compliance:   Yes     Changes in Health Issues:   None reported     Chemical Use Review:   Substance Use: Chemical use reviewed, no active concerns identified      Tobacco Use: Client reports she is down to 1 cigarette per day from 2 packs/day.   Collateral Reports Completed:   Not Applicable    PLAN: (Client Tasks / Therapist Tasks / Other)  Client to continue With her self-care routine and maintenance of boundaries.    Practice one to 2 grounding techniques each day.   Client to maintain sobriety and contact with supportive sober others.  Kd Morris                                                         ________________________________________________________________________    Treatment Plan    Client's Name: Bia Bill  YOB: 1965    Date: 10/24/2017      DSM5 Diagnoses: (Sustained by DSM5 Criteria Listed Above)  Diagnoses: 296.40 Bipolar I Disorder Current or Most Recent Episode Manic, Unspecified  Psychosocial & Contextual Factors: financial difficulties, chronic pain, roommate issues  WHODAS 2.0 (12 item)               This questionnaire asks about  difficulties due to health conditions. Health conditions             include disease or illnesses, other health problems that may be short or long lasting,             injuries, mental health or emotional problems, and problems with alcohol or drugs.                         Think back over the past 30 days and answer these questions, thinking about how much             difficulty you had doing the following activities. For each question, please [place] only             one response.      S1  Standing for long periods such as 30 minutes?  Mild =           2    S2  Taking care of household responsibilities?  Moderate =   3    S3  Learning a new task, for example, learning how to get to a new place?  Mild =           2    S4  How much of a problem do you have joining community activities (for example, festivals, Jewish or other activities) in the same way as anyone else can?  Moderate =   3    S5  How much have you been emotionally affected by your health problems?  Mild =           2        In the past 30 days, how much difficulty did you have in:    S6  Concentrating on doing something for ten minutes?  Mild =           2    S7  Walking a long distance such as a kilometer (or equivalent)?  Severe =       4    S8  Washing your whole body?  None =         1    S9  Getting dressed?  None =         1    S10  Dealing with people you do not know?  Moderate =   3    S11  Maintaining a friendship?  Severe =       4    S12  Your day to day work?  Moderate =   3       H1  Overall, in the past 30 days, how many days were these difficulties present?  Record number of days seven    H2  In the past 30 days, for how many days were you totally unable to carry out your usual activities or work because of any health condition?  Record number of days  seven    H3  In the past 30 days, not counting the days that you were totally unable, for how many days did you cut back or reduce your usual activities or work because of any health  condition?  Record number of days five                   Referral / Collaboration:  Referral to another professional/service is not indicated at this time..    Anticipated number of session or this episode of care: 15    Goals  1. Education- the Biopsychosocial model of depression  a. Client will be able to describe how depression is effecting them physically, emotionally and socially  2. Behavioral Activation  a. Client will learn to assess their depression on a day to day basis  b. Client will Identify two forms of exercise/activity and engage in them 3 times per week  c. Client will Identify 3 things that make them laugh, and engage in these 5 times per week.  d. Client will Identify 1-2Creative activities or hobbies  and engage in them 2 times per week  e. Client will identify music, movies, books that make them feel good and use them 3-4 times per week  3. Self-care  a. Client will identify 5 things they can do just for themselves  b. Client will take time for quiet, reflection, meditation 5 times per week  c. Client will Learn to set boundaries when appropriate  d. Client will Identify 2 individuals they can call on for support, distraction  4. Assessment of progress  a. Client will engage in assessment of their progress on a regular basis    Bipolar Disorder  Treatment plan:  1. Education- the Biopsychosocial model of Bipolar Disorder    a. Client will be able to describe in general terms what Bipolar Disorder  is and is not  b. Client will be able to describe how Bipolar Disorder  has affected their life in at least two different areas, such as school or work and Home/relationships  c. Clients parents/guardians or significant others will be provided information on what Bipolar Disorder  is and the ways it can affect relationships and be encouraged to be a part of clients treatment team.  2. Medication  a. Client will participate in medication evaluation for Bipolar Disorder  symptoms and follow medication  recommendations.   3. Identification and management of triggers  a. Client and therapist will examine patient s history to determine if there are predictable triggers for manic or depressive episodes (e.g. boredom, anger, family stress)  b. Client and therapist will develop means of diffusing these triggers (e.g. relaxation strategies, boundary setting, anger management)  4. Comorbid conditions   a. Client and therapist will asses for comorbid conditions (e.g. anxiety, depression, substance use). and add additional items to treatment plan as needed  5. Self-care  a. Client will identify 3 things they can do just for themselves  b. Client will take time for quiet, reflection, meditation 3 times per week  c. Client will Learn to set boundaries when appropriate  d. Client will Identify 2 individuals they can call on for support, distraction  5. Behavioral Activation  a. Client will Identify two forms of exercise/activity and engage in them 3 times per week  b. Client will Identify 2 things that make them laugh, and engage in these 3 times per week.  c. Client will Identify 2 Creative activities or hobbies  and engage in them 2 times per week  d. Client will identify music, movies, books that make them feel good and use them 3 times per week  6. Assessment of progress  a. Client will engage in assessment of their progress on a regular basis    Client has reviewed and agreed to the above plan.      Kd Morris  4/3/2018

## 2019-05-24 ENCOUNTER — TELEPHONE (OUTPATIENT)
Dept: FAMILY MEDICINE | Facility: CLINIC | Age: 54
End: 2019-05-24

## 2019-05-24 NOTE — TELEPHONE ENCOUNTER
Reason for Call:  Other prescription    Detailed comments: Norah was seen on 5/22/19. She went to pharmacy to  her medication and she is wondering why narcan nasal spray was prescribed for her?  Pharmacist told her that he didn't think it was necessary for her to take it as she is not on any narcotics.  Please review and advise. Thank you..Lizzette Mcintosh    Phone Number Patient can be reached at: Home number on file [phone] (home)    Best Time: any time    Can we leave a detailed message on this number? YES    Call taken on 5/24/2019 at 2:53 PM by Lizzette Mcintosh

## 2019-05-31 ENCOUNTER — HOSPITAL ENCOUNTER (OUTPATIENT)
Dept: PHYSICAL THERAPY | Facility: CLINIC | Age: 54
Setting detail: THERAPIES SERIES
End: 2019-05-31
Attending: FAMILY MEDICINE
Payer: COMMERCIAL

## 2019-05-31 DIAGNOSIS — M54.41 ACUTE LEFT-SIDED LOW BACK PAIN WITH RIGHT-SIDED SCIATICA: ICD-10-CM

## 2019-05-31 DIAGNOSIS — M16.51 POST-TRAUMATIC OSTEOARTHRITIS OF RIGHT HIP: ICD-10-CM

## 2019-05-31 PROCEDURE — 97161 PT EVAL LOW COMPLEX 20 MIN: CPT | Mod: GP

## 2019-05-31 PROCEDURE — 97140 MANUAL THERAPY 1/> REGIONS: CPT | Mod: GP

## 2019-05-31 PROCEDURE — 97110 THERAPEUTIC EXERCISES: CPT | Mod: GP

## 2019-05-31 NOTE — PROGRESS NOTES
05/31/19 0900   General Information   Type of Visit Initial OP Ortho PT Evaluation   Start of Care Date 05/31/19   Referring Physician Dr Leong   Patient/Family Goals Statement get rid of pain   Orders Evaluate and Treat   Date of Order 05/22/19   Certification Required? Yes   Medical Diagnosis Post-traumatic osteoarthritis of right hip, Acute left-sided low back pain with right-sided sciatica   Surgical/Medical history reviewed Yes  (asthma, COPD, Depression, HTN, Bipolar)   Body Part(s)   Body Part(s) Lumbar Spine/SI   Presentation and Etiology   Pertinent history of current problem (include personal factors and/or comorbidities that impact the POC) Pt was stepping over a board and her foot caught on it and she fell on her R knee 1 month ago. Her R knee is sore, post hip and LBP. Pain is getting worse. Sitting, standing, lying make it sore. She has to use a walker to walk since she fell.   Impairments A. Pain;B. Decreased WB tolerance;E. Decreased flexibility;G. Impaired balance;H. Impaired gait;K. Numbness  (numbness post leg, knee to toes)   Functional Limitations perform activities of daily living;perform desired leisure / sports activities   Symptom Location R LB, post hip, ant knee pain, post leg numbness   How/Where did it occur With a fall   Onset date of current episode/exacerbation 04/30/19   Chronicity New   Pain rating (0-10 point scale) Best (/10);Worst (/10)  (currently 10/10)   Best (/10) 5  (L SLing)   Worst (/10) 10   Pain quality A. Sharp;E. Shooting;F. Stabbing   Frequency of pain/symptoms A. Constant   Pain/symptoms are: The same all the time   Pain/symptoms exacerbated by A. Sitting;B. Walking;E. Rest;G. Certain positions;I. Bending;K. Home tasks  (stairs)   Pain/symptoms eased by D. Nothing   Progression of symptoms since onset: Worsened   Prior Level of Function   Functional Level Prior Comment independent   Current Level of Function   Current Community Support Family/friend caregiver  "  Patient role/employment history C. Homemaker   [facility]   Home/community accessibility stairs   Fall Risk Screen   Fall screen completed by PT   Have you fallen 2 or more times in the past year? No   Have you fallen and had an injury in the past year? Yes   Timed Up and Go score (seconds) 16   Is patient a fall risk? Yes   Abuse Screen (yes response referral indicated)   Feels Unsafe at Home or Work/School no   Feels Threatened by Someone no   Does Anyone Try to Keep You From Having Contact with Others or Doing Things Outside Your Home? no   Physical Signs of Abuse Present no   System Outcome Measures   Outcome Measures Low Back Pain (see [place] and [place])   Lumbar Spine/SI Objective Findings   Gait/Locomotion Amb with RW with fwd bent posture and slow julieta. Amb without AD with severe limp, decr R stance phase, lat dev to R, short step length   Balance/Proprioception (Single Leg Stance) L 3\", R unable due to incr pain   Flexion ROM fingers to upper thigh +++R LBP   Extension ROM WFL with decr LBP   Right Side Bending ROM WFL + R LBP   Left Side Bending ROM WFL ++ R LBP   Lumbar ROM Comment L rot mod limited +++ R LBP, R rot mild limited ++ R LBP   Pelvic Screen unable to assess [place] as pt cannot windy R SLS and R hip flex incr pain as well, R PSIS shallow, R JORY shallow, R sacrotuberous lig and piriformis tender,    Hip Screen R hip end ranges incr R LB and post hip pain   Transversus Abdominus Strength (Daniel Leg Lowering-deg) unable to perform plank or bridge   Hip Flexion (L2) Strength R 4/5, L 5/5   Hip Abduction Strength 5/5   Hip Adduction Strength 5/5   Hip Extension Strength 2/5   Knee Flexion Strength R 4+/5, L 5/5   Knee Extension (L3) Strength 5/5   Ankle Dorsiflexion (L4) Strength 5/5   Great Toe Extension (L5) Strength 5/5   Ankle Plantar Flexion (S1) Strength L 5-/5, R unable to assess due to inablility to SLS on R   Hamstring Flexibility tight at 45* SLR B   Hip " Flexor Flexibility tight R   Piriformis Flexibility tight R   SLR -   Crossover SLR -   Slump Test -   Spring Test painful L4-5, R SI joint   Segmental Mobility normal   Palpation tender R iliacus, psoas, gluts, piriformis, nontender on L   Planned Therapy Interventions   Planned Therapy Interventions ROM;strengthening;stretching;manual therapy   Clinical Impression   Criteria for Skilled Therapeutic Interventions Met yes, treatment indicated   PT Diagnosis R LBP. 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   Influenced by the following impairments pain and weakness   Functional limitations due to impairments walking, standing, sitting, lyiing   Clinical Presentation Stable/Uncomplicated   Clinical Presentation Rationale clinical judgement   Clinical Decision Making (Complexity) Low complexity   Therapy Frequency 1 time/week   Predicted Duration of Therapy Intervention (days/wks) 8 weeks   Risk & Benefits of therapy have been explained Yes   Patient, Family & other staff in agreement with plan of care Yes   Education Assessment   Preferred Learning Style Listening;Demonstration;Pictures/video   Barriers to Learning No barriers   ORTHO GOALS   PT Ortho Eval Goals 1;2;3;4   Ortho Goal 1   Goal Identifier 1   Goal Description Pt will be able to sleep through the night without waking with pain.   Target Date 06/28/19   Ortho Goal 2   Goal Identifier 2   Goal Description Pt will be able to amb with a SEC with < 4/10 pain.   Target Date 07/12/19   Ortho Goal 3   Goal Identifier 3   Goal Description Pt will be able to amb on stairs with < 4/10 pain.   Target Date 07/26/19   Ortho Goal 4   Goal Identifier 4   Goal Description Pt will be independent with HEP for optimal functional recovery.   Target Date 07/26/19   Total Evaluation Time   PT Eval, Low Complexity Minutes (37689) 30   Therapy Certification   Certification date from 05/31/19   Certification date to 08/09/19   Medical Diagnosis Post-traumatic osteoarthritis of  right hip, Acute left-sided low back pain with right-sided sciatica     Goldie Condon PT

## 2019-05-31 NOTE — PROGRESS NOTES
[facility]          OUTPATIENT PHYSICAL THERAPY ORTHOPEDIC EVALUATION  PLAN OF TREATMENT FOR OUTPATIENT REHABILITATION  (COMPLETE FOR INITIAL CLAIMS ONLY)  Patient's Last Name, First Name, M.I.  YOB: 1965  FlyBia LUGO    Provider s Name:  [facility]   Medical Record No.  [id number]   Start of Care Date:  05/31/19   Onset Date:  04/30/19   Type:     _X__PT   ___OT   ___SLP Medical Diagnosis:  Post-traumatic osteoarthritis of right hip, Acute left-sided low back pain with right-sided sciatica     PT Diagnosis:  R LBP. 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   Visits from SOC:  1      _________________________________________________________________________________  Plan of Treatment/Functional Goals:  ROM, strengthening, stretching, manual therapy           Goals  Goal Identifier: 1  Goal Description: Pt will be able to sleep through the night without waking with pain.  Target Date: 06/28/19    Goal Identifier: 2  Goal Description: Pt will be able to amb with a SEC with < 4/10 pain.  Target Date: 07/12/19    Goal Identifier: 3  Goal Description: Pt will be able to amb on stairs with < 4/10 pain.  Target Date: 07/26/19    Goal Identifier: 4  Goal Description: Pt will be independent with HEP for optimal functional recovery.  Target Date: 07/26/19                                                Therapy Frequency:  1 time/week  Predicted Duration of Therapy Intervention:  8 weeks    ASHWIN MCNAMARA, PT                 I CERTIFY THE NEED FOR THESE SERVICES FURNISHED UNDER        THIS PLAN OF TREATMENT AND WHILE UNDER MY CARE     (Physician co-signature of this document indicates review and certification of the therapy plan).                       Certification Date From:  05/31/19   Certification Date To:  08/09/19    Referring Provider:  Dr Leong    Initial Assessment        See Epic Evaluation Start of Care Date: 05/31/19

## 2019-06-03 ENCOUNTER — ANTICOAGULATION THERAPY VISIT (OUTPATIENT)
Dept: ANTICOAGULATION | Facility: CLINIC | Age: 54
End: 2019-06-03

## 2019-06-03 ENCOUNTER — OFFICE VISIT (OUTPATIENT)
Dept: PSYCHOLOGY | Facility: CLINIC | Age: 54
End: 2019-06-03
Payer: COMMERCIAL

## 2019-06-03 DIAGNOSIS — I82.4Y9 DEEP VEIN THROMBOSIS (DVT) OF PROXIMAL LOWER EXTREMITY, UNSPECIFIED CHRONICITY, UNSPECIFIED LATERALITY (H): Chronic | ICD-10-CM

## 2019-06-03 DIAGNOSIS — I73.9 PVD (PERIPHERAL VASCULAR DISEASE) WITH CLAUDICATION (H): ICD-10-CM

## 2019-06-03 DIAGNOSIS — Z86.73 HISTORY OF STROKE: ICD-10-CM

## 2019-06-03 DIAGNOSIS — I82.4Y9 DEEP VEIN THROMBOSIS (DVT) OF PROXIMAL LOWER EXTREMITY, UNSPECIFIED CHRONICITY, UNSPECIFIED LATERALITY (H): ICD-10-CM

## 2019-06-03 DIAGNOSIS — Z79.01 LONG TERM CURRENT USE OF ANTICOAGULANT THERAPY: ICD-10-CM

## 2019-06-03 DIAGNOSIS — I63.9 STROKE (H): ICD-10-CM

## 2019-06-03 DIAGNOSIS — I82.409 DVT (DEEP VENOUS THROMBOSIS) (H): ICD-10-CM

## 2019-06-03 DIAGNOSIS — F31.62 MODERATE MIXED BIPOLAR I DISORDER (H): Primary | ICD-10-CM

## 2019-06-03 LAB
BASOPHILS # BLD AUTO: 0 10E9/L (ref 0–0.2)
BASOPHILS NFR BLD AUTO: 0.2 %
DIFFERENTIAL METHOD BLD: ABNORMAL
EOSINOPHIL # BLD AUTO: 0.1 10E9/L (ref 0–0.7)
EOSINOPHIL NFR BLD AUTO: 1.1 %
ERYTHROCYTE [DISTWIDTH] IN BLOOD BY AUTOMATED COUNT: 21.6 % (ref 10–15)
HCT VFR BLD AUTO: 62.7 % (ref 35–47)
HGB BLD-MCNC: 19.9 G/DL (ref 11.7–15.7)
INR PPP: 2.97 (ref 0.86–1.14)
LYMPHOCYTES # BLD AUTO: 1.4 10E9/L (ref 0.8–5.3)
LYMPHOCYTES NFR BLD AUTO: 25.8 %
MCH RBC QN AUTO: 30 PG (ref 26.5–33)
MCHC RBC AUTO-ENTMCNC: 31.7 G/DL (ref 31.5–36.5)
MCV RBC AUTO: 94 FL (ref 78–100)
MONOCYTES # BLD AUTO: 0.3 10E9/L (ref 0–1.3)
MONOCYTES NFR BLD AUTO: 6.3 %
NEUTROPHILS # BLD AUTO: 3.6 10E9/L (ref 1.6–8.3)
NEUTROPHILS NFR BLD AUTO: 66.6 %
PLATELET # BLD AUTO: 109 10E9/L (ref 150–450)
RBC # BLD AUTO: 6.64 10E12/L (ref 3.8–5.2)
WBC # BLD AUTO: 5.4 10E9/L (ref 4–11)

## 2019-06-03 PROCEDURE — 85025 COMPLETE CBC W/AUTO DIFF WBC: CPT | Performed by: INTERNAL MEDICINE

## 2019-06-03 PROCEDURE — 36415 COLL VENOUS BLD VENIPUNCTURE: CPT | Performed by: INTERNAL MEDICINE

## 2019-06-03 PROCEDURE — 85610 PROTHROMBIN TIME: CPT | Performed by: INTERNAL MEDICINE

## 2019-06-03 PROCEDURE — 90834 PSYTX W PT 45 MINUTES: CPT | Performed by: PSYCHOLOGIST

## 2019-06-03 PROCEDURE — 99207 ZZC NO CHARGE NURSE ONLY: CPT

## 2019-06-03 NOTE — PROGRESS NOTES
ANTICOAGULATION FOLLOW-UP CLINIC VISIT    Patient Name:  Bia Bill  Date:  6/3/2019  Contact Type:  Telephone    SUBJECTIVE:  Patient Findings     Positives:   Other complaints (inflammation from fall, pain)    Comments:   No changes in medications, diet, or activity. No concerns with bleeding or bruising. Took warfarin as prescribed.   Patient had 65 mg in the previous 7 days, will decrease dose to 62.5 mg by the next INR check on 19, which is a 3.8% decrease.   Patient had a fall and has pain and inflammation of knee and hip. Pt will be starting PT this week as a result of the injuries sustained from the fall last week.   Patient verbalizes understanding and agrees with plan. No further questions at this time.           Clinical Outcomes     Comments:   No changes in medications, diet, or activity. No concerns with bleeding or bruising. Took warfarin as prescribed.   Patient had 65 mg in the previous 7 days, will decrease dose to 62.5 mg by the next INR check on 19, which is a 3.8% decrease.   Patient had a fall and has pain and inflammation of knee and hip. Pt will be starting PT this week as a result of the injuries sustained from the fall last week.   Patient verbalizes understanding and agrees with plan. No further questions at this time.              OBJECTIVE    INR   Date Value Ref Range Status   2019 2.97 (H) 0.86 - 1.14 Final     Comment:     Special tube used to correct for high hematocrit       ASSESSMENT / PLAN  INR assessment SUPRA    Recheck INR In: 2 WEEKS    INR Location Outside lab      Anticoagulation Summary  As of 6/3/2019    INR goal:   2.0-2.5   TTR:   46.5 % (4.2 y)   INR used for dosin.97! (6/3/2019)   Warfarin maintenance plan:   7.5 mg (5 mg x 1.5) every Mon, Fri; 10 mg (5 mg x 2) all other days   Full warfarin instructions:   : 7.5 mg; : 7.5 mg; : 7.5 mg; Otherwise 7.5 mg every Mon, Fri; 10 mg all other days   Weekly warfarin total:   65 mg   Plan  last modified:   Lima Shrestha RN (6/3/2019)   Next INR check:   6/20/2019   Priority:   INR   Target end date:   Indefinite    Indications    PVD (peripheral vascular disease) with claudication (H) [I73.9]  Long term current use of anticoagulant therapy [Z79.01]  DVT (deep venous thrombosis) (H) [I82.409]  Stroke (H) [I63.9]             Anticoagulation Episode Summary     INR check location:       Preferred lab:       Send INR reminders to:   [place] PHONE ANTICOAG POOL    Comments:   * lab draw due to elevated hematocrit (fingerstick meters don't work). LEFT LE. STENT x 3 LEFT UPPER LEG. Previous arterial clot. New goal range 2-2.5 starting 11/6/17.      Anticoagulation Care Providers     Provider Role Specialty Phone number    Kd Leong MD [facility] [phone]            See the Encounter Report to view Anticoagulation Flowsheet and Dosing Calendar (Go to Encounters tab in chart review, and find the Anticoagulation Therapy Visit)    Dosage adjustment made based on physician directed care plan.    Lima Shrestha, RN

## 2019-06-04 ASSESSMENT — ANXIETY QUESTIONNAIRES
7. FEELING AFRAID AS IF SOMETHING AWFUL MIGHT HAPPEN: NOT AT ALL
4. TROUBLE RELAXING: MORE THAN HALF THE DAYS
1. FEELING NERVOUS, ANXIOUS, OR ON EDGE: MORE THAN HALF THE DAYS
3. WORRYING TOO MUCH ABOUT DIFFERENT THINGS: SEVERAL DAYS
2. NOT BEING ABLE TO STOP OR CONTROL WORRYING: SEVERAL DAYS
GAD7 TOTAL SCORE: 10
5. BEING SO RESTLESS THAT IT IS HARD TO SIT STILL: MORE THAN HALF THE DAYS
6. BECOMING EASILY ANNOYED OR IRRITABLE: MORE THAN HALF THE DAYS

## 2019-06-04 ASSESSMENT — PATIENT HEALTH QUESTIONNAIRE - PHQ9: SUM OF ALL RESPONSES TO PHQ QUESTIONS 1-9: 8

## 2019-06-04 NOTE — PROGRESS NOTES
Progress Note  Disclaimer: This note consists of symbols derived from keyboarding, dictation and/or voice recognition software. As a result, there may be errors in the script that have gone undetected. Please consider this when interpreting information found in this chart.    Client Name: Bia Bill  Date: 6/3/2019         Service Type: Individual      Session Start Time: 9:30 AM   session End Time 10:15 AM    session Length: 45     Session #: 49     Attendees: Client attended alone    Treatment Plan Last Reviewed: 4/3/2019  PHQ-9 / CHIN-7 : See flowsheet     DATA  Client reports she got the x-ray results back and there were no major injuries to her hip, just some very painful muscle bruising.  She is continuing with physical therapy and is trying to fall proof her house so that she can remain in it.   Progress Since Last Session (Related to Symptoms / Goals / Homework):   Symptoms: Stable    Homework: Achieved / completed to satisfaction      Episode of Care Goals: Satisfactory progress - ACTION (Actively working towards change); Intervened by reinforcing change plan / affirming steps taken     Current / Ongoing Stressors and Concerns:   Recurrent depression  family relational problems, medical problems, chronic pain, trauma history      Treatment Objective(s) Addressed in This Session:   Increase interest, engagement, and pleasure in doing things  Decrease frequency and intensity of feeling down, depressed, hopeless       Intervention:   CBT: Behavioral activation supported client's effort at establishing maintaining healthy boundaries with her family.  Reinforced client's care for her grandchildren     ASSESSMENT: Current Emotional / Mental Status (status of significant symptoms):   Risk status (Self / Other harm or suicidal ideation)   Client denies current fears or concerns for personal safety.   Client denies current or recent suicidal ideation or  behaviors.   Client denies current or recent homicidal ideation or behaviors.   Client denies current or recent self injurious behavior or ideation.   Client denies other safety concerns.   A safety and risk management plan has not been developed at this time, however client was given the after-hours number / 911 should there be a change in any of these risk factors.     Appearance:   Appropriate    Eye Contact:   Good    Psychomotor Behavior: Normal    Attitude:   Cooperative    Orientation:   All   Speech    Rate / Production: Normal     Volume:  Normal    Mood:    Normal   Affect:    Appropriate    Thought Content:  Clear    Thought Form:  Coherent  Logical    Insight:    Good      Medication Review:   No changes to current psychiatric medication(s)     Medication Compliance:   Yes     Changes in Health Issues:   None reported     Chemical Use Review:   Substance Use: Chemical use reviewed, no active concerns identified      Tobacco Use: Client reports she is down to 1 cigarette per day from 2 packs/day.   Collateral Reports Completed:   Not Applicable    PLAN: (Client Tasks / Therapist Tasks / Other)  Client to continue With her self-care routine and maintenance of boundaries.    Practice one to 2 grounding techniques each day.   Client to maintain sobriety and contact with supportive sober others.  Kd Morris                                                         ________________________________________________________________________    Treatment Plan    Client's Name: Bia Bill  YOB: 1965    Date: 10/24/2017      DSM5 Diagnoses: (Sustained by DSM5 Criteria Listed Above)  Diagnoses: 296.40 Bipolar I Disorder Current or Most Recent Episode Manic, Unspecified  Psychosocial & Contextual Factors: financial difficulties, chronic pain, roommate issues  WHODAS 2.0 (12 item)               This questionnaire asks about difficulties due to health conditions. Health conditions              include disease or illnesses, other health problems that may be short or long lasting,             injuries, mental health or emotional problems, and problems with alcohol or drugs.                         Think back over the past 30 days and answer these questions, thinking about how much             difficulty you had doing the following activities. For each question, please [place] only             one response.      S1  Standing for long periods such as 30 minutes?  Mild =           2    S2  Taking care of household responsibilities?  Moderate =   3    S3  Learning a new task, for example, learning how to get to a new place?  Mild =           2    S4  How much of a problem do you have joining community activities (for example, festivals, Scientologist or other activities) in the same way as anyone else can?  Moderate =   3    S5  How much have you been emotionally affected by your health problems?  Mild =           2        In the past 30 days, how much difficulty did you have in:    S6  Concentrating on doing something for ten minutes?  Mild =           2    S7  Walking a long distance such as a kilometer (or equivalent)?  Severe =       4    S8  Washing your whole body?  None =         1    S9  Getting dressed?  None =         1    S10  Dealing with people you do not know?  Moderate =   3    S11  Maintaining a friendship?  Severe =       4    S12  Your day to day work?  Moderate =   3       H1  Overall, in the past 30 days, how many days were these difficulties present?  Record number of days seven    H2  In the past 30 days, for how many days were you totally unable to carry out your usual activities or work because of any health condition?  Record number of days  seven    H3  In the past 30 days, not counting the days that you were totally unable, for how many days did you cut back or reduce your usual activities or work because of any health condition?  Record number of days five                    Referral / Collaboration:  Referral to another professional/service is not indicated at this time..    Anticipated number of session or this episode of care: 15    Goals  1. Education- the Biopsychosocial model of depression  a. Client will be able to describe how depression is effecting them physically, emotionally and socially  2. Behavioral Activation  a. Client will learn to assess their depression on a day to day basis  b. Client will Identify two forms of exercise/activity and engage in them 3 times per week  c. Client will Identify 3 things that make them laugh, and engage in these 5 times per week.  d. Client will Identify 1-2Creative activities or hobbies  and engage in them 2 times per week  e. Client will identify music, movies, books that make them feel good and use them 3-4 times per week  3. Self-care  a. Client will identify 5 things they can do just for themselves  b. Client will take time for quiet, reflection, meditation 5 times per week  c. Client will Learn to set boundaries when appropriate  d. Client will Identify 2 individuals they can call on for support, distraction  4. Assessment of progress  a. Client will engage in assessment of their progress on a regular basis    Bipolar Disorder  Treatment plan:  1. Education- the Biopsychosocial model of Bipolar Disorder    a. Client will be able to describe in general terms what Bipolar Disorder  is and is not  b. Client will be able to describe how Bipolar Disorder  has affected their life in at least two different areas, such as school or work and Home/relationships  c. Clients parents/guardians or significant others will be provided information on what Bipolar Disorder  is and the ways it can affect relationships and be encouraged to be a part of clients treatment team.  2. Medication  a. Client will participate in medication evaluation for Bipolar Disorder  symptoms and follow medication recommendations.   3. Identification and management  of triggers  a. Client and therapist will examine patient s history to determine if there are predictable triggers for manic or depressive episodes (e.g. boredom, anger, family stress)  b. Client and therapist will develop means of diffusing these triggers (e.g. relaxation strategies, boundary setting, anger management)  4. Comorbid conditions   a. Client and therapist will asses for comorbid conditions (e.g. anxiety, depression, substance use). and add additional items to treatment plan as needed  5. Self-care  a. Client will identify 3 things they can do just for themselves  b. Client will take time for quiet, reflection, meditation 3 times per week  c. Client will Learn to set boundaries when appropriate  d. Client will Identify 2 individuals they can call on for support, distraction  5. Behavioral Activation  a. Client will Identify two forms of exercise/activity and engage in them 3 times per week  b. Client will Identify 2 things that make them laugh, and engage in these 3 times per week.  c. Client will Identify 2 Creative activities or hobbies  and engage in them 2 times per week  d. Client will identify music, movies, books that make them feel good and use them 3 times per week  6. Assessment of progress  a. Client will engage in assessment of their progress on a regular basis    Client has reviewed and agreed to the above plan.      Kd Morris  4/3/2018

## 2019-06-05 ASSESSMENT — ANXIETY QUESTIONNAIRES: GAD7 TOTAL SCORE: 10

## 2019-06-06 ENCOUNTER — OFFICE VISIT (OUTPATIENT)
Dept: URGENT CARE | Facility: URGENT CARE | Age: 54
End: 2019-06-06
Payer: COMMERCIAL

## 2019-06-06 ENCOUNTER — APPOINTMENT (OUTPATIENT)
Dept: ULTRASOUND IMAGING | Facility: CLINIC | Age: 54
End: 2019-06-06
Attending: EMERGENCY MEDICINE
Payer: COMMERCIAL

## 2019-06-06 ENCOUNTER — HOSPITAL ENCOUNTER (OUTPATIENT)
Dept: PHYSICAL THERAPY | Facility: CLINIC | Age: 54
Setting detail: THERAPIES SERIES
End: 2019-06-06
Attending: FAMILY MEDICINE
Payer: COMMERCIAL

## 2019-06-06 ENCOUNTER — HOSPITAL ENCOUNTER (INPATIENT)
Facility: CLINIC | Age: 54
LOS: 1 days | Discharge: HOME | End: 2019-06-08
Attending: EMERGENCY MEDICINE | Admitting: FAMILY MEDICINE
Payer: COMMERCIAL

## 2019-06-06 VITALS
BODY MASS INDEX: 49.53 KG/M2 | DIASTOLIC BLOOD PRESSURE: 54 MMHG | RESPIRATION RATE: 28 BRPM | HEART RATE: 98 BPM | WEIGHT: 237 LBS | OXYGEN SATURATION: 91 % | TEMPERATURE: 98.6 F | SYSTOLIC BLOOD PRESSURE: 126 MMHG

## 2019-06-06 DIAGNOSIS — M79.89 SWELLING OF LIMB: ICD-10-CM

## 2019-06-06 DIAGNOSIS — L03.115 CELLULITIS OF RIGHT LOWER EXTREMITY: Primary | ICD-10-CM

## 2019-06-06 DIAGNOSIS — L03.115 CELLULITIS OF RIGHT LOWER EXTREMITY: ICD-10-CM

## 2019-06-06 PROBLEM — L03.90 CELLULITIS: Status: ACTIVE | Noted: 2019-06-06

## 2019-06-06 LAB
ANION GAP SERPL CALCULATED.3IONS-SCNC: 7 MMOL/L (ref 3–14)
BASOPHILS # BLD AUTO: 0 10E9/L (ref 0–0.2)
BASOPHILS NFR BLD AUTO: 0.1 %
BUN SERPL-MCNC: 9 MG/DL (ref 7–30)
CALCIUM SERPL-MCNC: 9.1 MG/DL (ref 8.5–10.1)
CHLORIDE SERPL-SCNC: 99 MMOL/L (ref 94–109)
CO2 SERPL-SCNC: 32 MMOL/L (ref 20–32)
CREAT SERPL-MCNC: 0.75 MG/DL (ref 0.52–1.04)
DIFFERENTIAL METHOD BLD: ABNORMAL
EOSINOPHIL # BLD AUTO: 0 10E9/L (ref 0–0.7)
EOSINOPHIL NFR BLD AUTO: 0.1 %
ERYTHROCYTE [DISTWIDTH] IN BLOOD BY AUTOMATED COUNT: 21 % (ref 10–15)
GFR SERPL CREATININE-BSD FRML MDRD: >90 ML/MIN/{1.73_M2}
GLUCOSE SERPL-MCNC: 125 MG/DL (ref 70–99)
HCT VFR BLD AUTO: 59.4 % (ref 35–47)
HGB BLD-MCNC: 18.8 G/DL (ref 11.7–15.7)
INR PPP: 2.13 (ref 0.86–1.14)
LACTATE BLD-SCNC: 2.2 MMOL/L (ref 0.7–2)
LYMPHOCYTES # BLD AUTO: 1.7 10E9/L (ref 0.8–5.3)
LYMPHOCYTES NFR BLD AUTO: 12 %
MCH RBC QN AUTO: 29.9 PG (ref 26.5–33)
MCHC RBC AUTO-ENTMCNC: 31.6 G/DL (ref 31.5–36.5)
MCV RBC AUTO: 94 FL (ref 78–100)
MONOCYTES # BLD AUTO: 0.8 10E9/L (ref 0–1.3)
MONOCYTES NFR BLD AUTO: 5.6 %
NEUTROPHILS # BLD AUTO: 11.3 10E9/L (ref 1.6–8.3)
NEUTROPHILS NFR BLD AUTO: 82.2 %
PLATELET # BLD AUTO: 109 10E9/L (ref 150–450)
POTASSIUM SERPL-SCNC: 2.8 MMOL/L (ref 3.4–5.3)
RBC # BLD AUTO: 6.29 10E12/L (ref 3.8–5.2)
SODIUM SERPL-SCNC: 138 MMOL/L (ref 133–144)
WBC # BLD AUTO: 13.8 10E9/L (ref 4–11)

## 2019-06-06 PROCEDURE — 85025 COMPLETE CBC W/AUTO DIFF WBC: CPT | Performed by: NURSE PRACTITIONER

## 2019-06-06 PROCEDURE — 97140 MANUAL THERAPY 1/> REGIONS: CPT | Mod: GP

## 2019-06-06 PROCEDURE — 36415 COLL VENOUS BLD VENIPUNCTURE: CPT | Performed by: NURSE PRACTITIONER

## 2019-06-06 PROCEDURE — 99285 EMERGENCY DEPT VISIT HI MDM: CPT | Mod: 25 | Performed by: EMERGENCY MEDICINE

## 2019-06-06 PROCEDURE — 96365 THER/PROPH/DIAG IV INF INIT: CPT | Performed by: EMERGENCY MEDICINE

## 2019-06-06 PROCEDURE — 83605 ASSAY OF LACTIC ACID: CPT | Performed by: EMERGENCY MEDICINE

## 2019-06-06 PROCEDURE — 85610 PROTHROMBIN TIME: CPT | Performed by: EMERGENCY MEDICINE

## 2019-06-06 PROCEDURE — 80048 BASIC METABOLIC PNL TOTAL CA: CPT | Performed by: EMERGENCY MEDICINE

## 2019-06-06 PROCEDURE — 99207 ZZC OFFICE-HOSPITAL ADMIT: CPT | Performed by: NURSE PRACTITIONER

## 2019-06-06 PROCEDURE — G0378 HOSPITAL OBSERVATION PER HR: HCPCS

## 2019-06-06 PROCEDURE — 97110 THERAPEUTIC EXERCISES: CPT | Mod: GP

## 2019-06-06 PROCEDURE — 93971 EXTREMITY STUDY: CPT | Mod: RT

## 2019-06-06 PROCEDURE — 25000128 H RX IP 250 OP 636: Performed by: EMERGENCY MEDICINE

## 2019-06-06 PROCEDURE — 87040 BLOOD CULTURE FOR BACTERIA: CPT | Performed by: EMERGENCY MEDICINE

## 2019-06-06 RX ORDER — PROCHLORPERAZINE 25 MG
25 SUPPOSITORY, RECTAL RECTAL EVERY 12 HOURS PRN
Status: DISCONTINUED | OUTPATIENT
Start: 2019-06-06 | End: 2019-06-08 | Stop reason: HOSPADM

## 2019-06-06 RX ORDER — ONDANSETRON 2 MG/ML
4 INJECTION INTRAMUSCULAR; INTRAVENOUS EVERY 6 HOURS PRN
Status: DISCONTINUED | OUTPATIENT
Start: 2019-06-06 | End: 2019-06-08 | Stop reason: HOSPADM

## 2019-06-06 RX ORDER — PROCHLORPERAZINE MALEATE 5 MG
10 TABLET ORAL EVERY 6 HOURS PRN
Status: DISCONTINUED | OUTPATIENT
Start: 2019-06-06 | End: 2019-06-08 | Stop reason: HOSPADM

## 2019-06-06 RX ORDER — ONDANSETRON 4 MG/1
4 TABLET, ORALLY DISINTEGRATING ORAL EVERY 6 HOURS PRN
Status: DISCONTINUED | OUTPATIENT
Start: 2019-06-06 | End: 2019-06-08 | Stop reason: HOSPADM

## 2019-06-06 RX ORDER — ACETAMINOPHEN 325 MG/1
650 TABLET ORAL EVERY 4 HOURS PRN
Status: DISCONTINUED | OUTPATIENT
Start: 2019-06-06 | End: 2019-06-08 | Stop reason: HOSPADM

## 2019-06-06 RX ORDER — CEFTRIAXONE SODIUM 1 G/50ML
1 INJECTION, SOLUTION INTRAVENOUS EVERY 24 HOURS
Status: DISCONTINUED | OUTPATIENT
Start: 2019-06-06 | End: 2019-06-07

## 2019-06-06 RX ADMIN — CEFTRIAXONE SODIUM 1 G: 1 INJECTION, SOLUTION INTRAVENOUS at 22:55

## 2019-06-06 ASSESSMENT — MIFFLIN-ST. JEOR: SCORE: 1590.65

## 2019-06-07 PROBLEM — E66.2 MORBID OBESITY WITH ALVEOLAR HYPOVENTILATION (H): Chronic | Status: ACTIVE | Noted: 2017-02-01

## 2019-06-07 PROBLEM — K92.2 GI BLEED: Status: RESOLVED | Noted: 2017-10-19 | Resolved: 2019-06-07

## 2019-06-07 PROBLEM — H90.A21 SENSORINEURAL HEARING LOSS (SNHL) OF RIGHT EAR WITH RESTRICTED HEARING OF LEFT EAR: Chronic | Status: ACTIVE | Noted: 2018-04-17

## 2019-06-07 LAB
ANION GAP SERPL CALCULATED.3IONS-SCNC: 6 MMOL/L (ref 3–14)
BUN SERPL-MCNC: 10 MG/DL (ref 7–30)
CALCIUM SERPL-MCNC: 9.2 MG/DL (ref 8.5–10.1)
CHLORIDE SERPL-SCNC: 99 MMOL/L (ref 94–109)
CO2 SERPL-SCNC: 34 MMOL/L (ref 20–32)
CREAT SERPL-MCNC: 0.73 MG/DL (ref 0.52–1.04)
ERYTHROCYTE [DISTWIDTH] IN BLOOD BY AUTOMATED COUNT: 20.4 % (ref 10–15)
GFR SERPL CREATININE-BSD FRML MDRD: >90 ML/MIN/{1.73_M2}
GLUCOSE SERPL-MCNC: 178 MG/DL (ref 70–99)
HCT VFR BLD AUTO: 58 % (ref 35–47)
HGB BLD-MCNC: 17.7 G/DL (ref 11.7–15.7)
INR PPP: 2.33 (ref 0.86–1.14)
MCH RBC QN AUTO: 29.1 PG (ref 26.5–33)
MCHC RBC AUTO-ENTMCNC: 30.5 G/DL (ref 31.5–36.5)
MCV RBC AUTO: 95 FL (ref 78–100)
PLATELET # BLD AUTO: 114 10E9/L (ref 150–450)
POTASSIUM SERPL-SCNC: 3.5 MMOL/L (ref 3.4–5.3)
RBC # BLD AUTO: 6.08 10E12/L (ref 3.8–5.2)
SODIUM SERPL-SCNC: 139 MMOL/L (ref 133–144)
WBC # BLD AUTO: 10.7 10E9/L (ref 4–11)

## 2019-06-07 PROCEDURE — 25000132 ZZH RX MED GY IP 250 OP 250 PS 637: Performed by: EMERGENCY MEDICINE

## 2019-06-07 PROCEDURE — 36415 COLL VENOUS BLD VENIPUNCTURE: CPT | Performed by: FAMILY MEDICINE

## 2019-06-07 PROCEDURE — 25000132 ZZH RX MED GY IP 250 OP 250 PS 637: Performed by: FAMILY MEDICINE

## 2019-06-07 PROCEDURE — 99222 1ST HOSP IP/OBS MODERATE 55: CPT | Mod: AI | Performed by: FAMILY MEDICINE

## 2019-06-07 PROCEDURE — G0378 HOSPITAL OBSERVATION PER HR: HCPCS

## 2019-06-07 PROCEDURE — 36415 COLL VENOUS BLD VENIPUNCTURE: CPT | Performed by: INTERNAL MEDICINE

## 2019-06-07 PROCEDURE — 25000132 ZZH RX MED GY IP 250 OP 250 PS 637: Performed by: PHYSICIAN ASSISTANT

## 2019-06-07 PROCEDURE — 25000128 H RX IP 250 OP 636: Performed by: PHYSICIAN ASSISTANT

## 2019-06-07 PROCEDURE — 99207 ZZC APP CREDIT; MD BILLING SHARED VISIT: CPT | Performed by: PHYSICIAN ASSISTANT

## 2019-06-07 PROCEDURE — 25000132 ZZH RX MED GY IP 250 OP 250 PS 637: Performed by: INTERNAL MEDICINE

## 2019-06-07 PROCEDURE — 85027 COMPLETE CBC AUTOMATED: CPT | Performed by: INTERNAL MEDICINE

## 2019-06-07 PROCEDURE — 85610 PROTHROMBIN TIME: CPT | Performed by: FAMILY MEDICINE

## 2019-06-07 PROCEDURE — 80048 BASIC METABOLIC PNL TOTAL CA: CPT | Performed by: INTERNAL MEDICINE

## 2019-06-07 PROCEDURE — 12000000 ZZH R&B MED SURG/OB

## 2019-06-07 RX ORDER — ALBUTEROL SULFATE 0.83 MG/ML
2.5 SOLUTION RESPIRATORY (INHALATION) EVERY 6 HOURS PRN
Status: DISCONTINUED | OUTPATIENT
Start: 2019-06-07 | End: 2019-06-08 | Stop reason: HOSPADM

## 2019-06-07 RX ORDER — NALOXONE HYDROCHLORIDE 0.4 MG/ML
.1-.4 INJECTION, SOLUTION INTRAMUSCULAR; INTRAVENOUS; SUBCUTANEOUS
Status: DISCONTINUED | OUTPATIENT
Start: 2019-06-07 | End: 2019-06-08 | Stop reason: HOSPADM

## 2019-06-07 RX ORDER — GABAPENTIN 600 MG/1
600 TABLET ORAL 2 TIMES DAILY
Status: DISCONTINUED | OUTPATIENT
Start: 2019-06-07 | End: 2019-06-08 | Stop reason: HOSPADM

## 2019-06-07 RX ORDER — LEVOTHYROXINE SODIUM 150 UG/1
150 TABLET ORAL DAILY
Status: DISCONTINUED | OUTPATIENT
Start: 2019-06-07 | End: 2019-06-08 | Stop reason: HOSPADM

## 2019-06-07 RX ORDER — WARFARIN SODIUM 7.5 MG/1
7.5 TABLET ORAL
Status: COMPLETED | OUTPATIENT
Start: 2019-06-07 | End: 2019-06-07

## 2019-06-07 RX ORDER — ALLOPURINOL 300 MG/1
300 TABLET ORAL DAILY
Status: DISCONTINUED | OUTPATIENT
Start: 2019-06-07 | End: 2019-06-08 | Stop reason: HOSPADM

## 2019-06-07 RX ORDER — SIMVASTATIN 20 MG
20 TABLET ORAL AT BEDTIME
Status: DISCONTINUED | OUTPATIENT
Start: 2019-06-07 | End: 2019-06-08 | Stop reason: HOSPADM

## 2019-06-07 RX ORDER — CEFAZOLIN SODIUM 1 G/50ML
1 INJECTION, SOLUTION INTRAVENOUS EVERY 8 HOURS
Status: DISCONTINUED | OUTPATIENT
Start: 2019-06-07 | End: 2019-06-08 | Stop reason: HOSPADM

## 2019-06-07 RX ORDER — FUROSEMIDE 40 MG
40 TABLET ORAL
Status: DISCONTINUED | OUTPATIENT
Start: 2019-06-07 | End: 2019-06-08 | Stop reason: HOSPADM

## 2019-06-07 RX ORDER — ARIPIPRAZOLE 10 MG/1
10 TABLET ORAL AT BEDTIME
Status: DISCONTINUED | OUTPATIENT
Start: 2019-06-07 | End: 2019-06-08 | Stop reason: HOSPADM

## 2019-06-07 RX ORDER — PRAMIPEXOLE DIHYDROCHLORIDE 0.12 MG/1
.125-.25 TABLET ORAL AT BEDTIME
Status: DISCONTINUED | OUTPATIENT
Start: 2019-06-07 | End: 2019-06-08 | Stop reason: HOSPADM

## 2019-06-07 RX ORDER — IPRATROPIUM BROMIDE AND ALBUTEROL SULFATE 2.5; .5 MG/3ML; MG/3ML
1 SOLUTION RESPIRATORY (INHALATION) EVERY 6 HOURS PRN
Status: DISCONTINUED | OUTPATIENT
Start: 2019-06-07 | End: 2019-06-08 | Stop reason: HOSPADM

## 2019-06-07 RX ORDER — GABAPENTIN 300 MG/1
900 CAPSULE ORAL AT BEDTIME
Status: DISCONTINUED | OUTPATIENT
Start: 2019-06-07 | End: 2019-06-08 | Stop reason: HOSPADM

## 2019-06-07 RX ORDER — LAMOTRIGINE 100 MG/1
100 TABLET ORAL DAILY
Status: DISCONTINUED | OUTPATIENT
Start: 2019-06-07 | End: 2019-06-08 | Stop reason: HOSPADM

## 2019-06-07 RX ORDER — BUPROPION HYDROCHLORIDE 150 MG/1
150 TABLET ORAL AT BEDTIME
Status: DISCONTINUED | OUTPATIENT
Start: 2019-06-07 | End: 2019-06-08 | Stop reason: HOSPADM

## 2019-06-07 RX ORDER — POTASSIUM CHLORIDE 1.5 G/1.58G
40 POWDER, FOR SOLUTION ORAL ONCE
Status: COMPLETED | OUTPATIENT
Start: 2019-06-07 | End: 2019-06-07

## 2019-06-07 RX ORDER — POTASSIUM CHLORIDE 750 MG/1
10 TABLET, EXTENDED RELEASE ORAL DAILY
Status: DISCONTINUED | OUTPATIENT
Start: 2019-06-07 | End: 2019-06-08 | Stop reason: HOSPADM

## 2019-06-07 RX ADMIN — SIMVASTATIN 20 MG: 20 TABLET, FILM COATED ORAL at 22:09

## 2019-06-07 RX ADMIN — ACETAMINOPHEN 650 MG: 325 TABLET, FILM COATED ORAL at 03:02

## 2019-06-07 RX ADMIN — PRAMIPEXOLE DIHYDROCHLORIDE 0.25 MG: 0.12 TABLET ORAL at 22:09

## 2019-06-07 RX ADMIN — ACETAMINOPHEN 650 MG: 325 TABLET, FILM COATED ORAL at 11:28

## 2019-06-07 RX ADMIN — FLUTICASONE FUROATE AND VILANTEROL TRIFENATATE 1 PUFF: 200; 25 POWDER RESPIRATORY (INHALATION) at 13:35

## 2019-06-07 RX ADMIN — LEVOTHYROXINE SODIUM 150 MCG: 150 TABLET ORAL at 13:34

## 2019-06-07 RX ADMIN — GABAPENTIN 900 MG: 300 CAPSULE ORAL at 22:08

## 2019-06-07 RX ADMIN — GABAPENTIN 600 MG: 600 TABLET, FILM COATED ORAL at 13:34

## 2019-06-07 RX ADMIN — POTASSIUM CHLORIDE 10 MEQ: 750 TABLET, FILM COATED, EXTENDED RELEASE ORAL at 13:34

## 2019-06-07 RX ADMIN — Medication 1 MG: at 16:38

## 2019-06-07 RX ADMIN — FUROSEMIDE 40 MG: 40 TABLET ORAL at 13:33

## 2019-06-07 RX ADMIN — BUPROPION HYDROCHLORIDE 150 MG: 150 TABLET, FILM COATED, EXTENDED RELEASE ORAL at 22:08

## 2019-06-07 RX ADMIN — LAMOTRIGINE 100 MG: 100 TABLET ORAL at 13:34

## 2019-06-07 RX ADMIN — OMEPRAZOLE 20 MG: 20 CAPSULE, DELAYED RELEASE ORAL at 16:38

## 2019-06-07 RX ADMIN — ARIPIPRAZOLE 10 MG: 5 TABLET ORAL at 22:09

## 2019-06-07 RX ADMIN — WARFARIN SODIUM 7.5 MG: 7.5 TABLET ORAL at 17:35

## 2019-06-07 RX ADMIN — FUROSEMIDE 40 MG: 40 TABLET ORAL at 16:38

## 2019-06-07 RX ADMIN — TRAZODONE HYDROCHLORIDE 150 MG: 50 TABLET ORAL at 22:09

## 2019-06-07 RX ADMIN — POTASSIUM CHLORIDE 40 MEQ: 1.5 POWDER, FOR SOLUTION ORAL at 04:05

## 2019-06-07 RX ADMIN — Medication 1 MG: at 19:44

## 2019-06-07 RX ADMIN — CEFAZOLIN SODIUM 1 G: 1 INJECTION, SOLUTION INTRAVENOUS at 22:10

## 2019-06-07 RX ADMIN — ALLOPURINOL 300 MG: 300 TABLET ORAL at 13:34

## 2019-06-07 ASSESSMENT — ACTIVITIES OF DAILY LIVING (ADL)
TRANSFERRING: 1-->ASSISTIVE EQUIPMENT
DRESS: 0-->INDEPENDENT
WHICH_OF_THE_ABOVE_FUNCTIONAL_RISKS_HAD_A_RECENT_ONSET_OR_CHANGE?: AMBULATION
RETIRED_EATING: 0-->INDEPENDENT
RETIRED_COMMUNICATION: 0-->UNDERSTANDS/COMMUNICATES WITHOUT DIFFICULTY
BATHING: 0-->INDEPENDENT
FALL_HISTORY_WITHIN_LAST_SIX_MONTHS: NO
AMBULATION: 1-->ASSISTIVE EQUIPMENT
ADLS_ACUITY_SCORE: 13
COGNITION: 0 - NO COGNITION ISSUES REPORTED
ADLS_ACUITY_SCORE: 13
TOILETING: 0-->INDEPENDENT
SWALLOWING: 0-->SWALLOWS FOODS/LIQUIDS WITHOUT DIFFICULTY
NUMBER_OF_TIMES_PATIENT_HAS_FALLEN_WITHIN_LAST_SIX_MONTHS: 1

## 2019-06-07 ASSESSMENT — PAIN DESCRIPTION - DESCRIPTORS: DESCRIPTORS: BURNING

## 2019-06-07 ASSESSMENT — MIFFLIN-ST. JEOR: SCORE: 1555.63

## 2019-06-07 NOTE — PROGRESS NOTES
Subjective     Bia Bill is a 53 year old female who presents to clinic today for the following health issues:    HPI     Chief Complaint   Patient presents with     Insect Bites     happened last night, fatigue, right leg, redness, swelling, muscle hurts, warm to the touch, seeping, tender          Patient Active Problem List   Diagnosis     Mild persistent asthma     Polyneuropathy in other diseases classified elsewhere (H)     DVT (deep venous thrombosis) (H)     Alcohol abuse, in remission     SECONDARY POLYCYTHEMIA     Chondromalacia of patella     Sensorineural hearing loss, asymmetrical     Developmental reading disorder     Esophageal reflux     Hypothyroidism     Fatty liver     GILES (Obstructive Sleep Apnea)-Moderate (AHI 16)     RLS (restless legs syndrome)     Smoker     Erythrocytosis     Moderate mixed bipolar I disorder (H)     Personality disorder, depressive     COPD (chronic obstructive pulmonary disease) (H)     Current use of long term anticoagulation     Rosacea     Health Care Home     DDD (degenerative disc disease), cervical     Benign neoplasm of colon (POLYPOSIS)     Stroke (H)     Somatization disorder     Sebaceous cyst of right axilla     HTN, goal below 140/90     Left leg pain     Vitamin D deficiency     Long term current use of anticoagulant therapy     Morbid obesity (H)     PVD (peripheral vascular disease) with claudication (H)     Cyst of left ovary     Morbid obesity with alveolar hypoventilation (H)     GI bleed     Severe episode of recurrent major depressive disorder (H)     Conductive hearing loss of left ear with restricted hearing of right ear     Sensorineural hearing loss (SNHL) of right ear with restricted hearing of left ear     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     Past Surgical History:   Procedure Laterality Date     BILIARY STENT SINGLE USE COV      3 stints in left leg     BONE MARROW BIOPSY, BONE SPECIMEN,  NEEDLE/TROCAR N/A 11/17/2014    Procedure: BIOPSY BONE MARROW;  Surgeon: Hay Aaron MD;  Location: [facility]     D & C  10/26/09    with uterine ablation     ESOPHAGOSCOPY, GASTROSCOPY, DUODENOSCOPY (EGD), COMBINED  4/21/2014    Procedure: Gastroscopy;  Surgeon: Moris Thomas MD;  Location: [facility]     HYSTERECTOMY, PAP NO LONGER INDICATED  1-4-2010     LAPAROSCOPIC CHOLECYSTECTOMY N/A 1/4/2019    Procedure: Laparoscopic cholecystectomy;  Surgeon: Aaron Siegel MD;  Location: [facility]     LITHOTRIPSY  2004    Lithotrypsy       Social History     Tobacco Use     Smoking status: Current Every Day Smoker     Packs/day: 0.25     Years: 34.00     Pack years: 8.50     Types: Cigarettes     Smokeless tobacco: Never Used     Tobacco comment: started at age 11.  Quit during pregnancyX4.  Quit 3 months ago.    Substance Use Topics     Alcohol use: No     Alcohol/week: 0.0 oz     Comment: quit 1995     Family History   Problem Relation Age of Onset     Hypertension Mother      Diabetes Mother      Blood Disease Mother      Heart Disease Mother         chf     Cerebrovascular Disease Mother      Hypertension Father      Cancer Father         lung cancer     Allergies Sister      Diabetes Sister      Depression Sister      Hypertension Sister          Current Outpatient Medications   Medication Sig Dispense Refill     acetaminophen (TYLENOL) 500 MG tablet Take 500-1,000 mg by mouth every 6 hours as needed for mild pain       albuterol (2.5 MG/3ML) 0.083% neb solution Take 1 vial (2.5 mg) by nebulization every 6 hours as needed for shortness of breath / dyspnea or wheezing 1 Box 0     albuterol (PROAIR HFA/PROVENTIL HFA/VENTOLIN HFA) 108 (90 BASE) MCG/ACT Inhaler Inhale 2 puffs into the lungs every 6 hours as needed for shortness of breath / dyspnea or wheezing 3 Inhaler 1     allopurinol (ZYLOPRIM) 300 MG tablet Take 1 tablet (300 mg) by mouth daily 90 tablet 3     ARIPiprazole (ABILIFY) 10 MG tablet TAKE ONE  TABLET BY MOUTH EVERY DAY 90 tablet 1     buPROPion (WELLBUTRIN XL) 150 MG 24 hr tablet TAKE ONE TABLET BY MOUTH EVERY DAY IN THE MORNING. 30 tablet 5     furosemide (LASIX) 20 MG tablet TAKE TWO TABLETS BY MOUTH TWICE DAILY 360 tablet 3     gabapentin (NEURONTIN) 300 MG capsule TAKE ONE CAPSULE BY MOUTH ONCE DAILY AT BEDTIME WITH A 600 MG TABLET FOR A TOTAL DOSE  MG 90 capsule 0     gabapentin (NEURONTIN) 300 MG capsule ONE CAPSULE BY MOUTH AT BEDTIME WITH 600 MG CAPSULE- FOR TOTAL  MG AT BEDTIME 90 capsule 3     gabapentin (NEURONTIN) 600 MG tablet TAKE ONE TABLET BY MOUTH THREE TIMES A DAY 90 tablet 0     gabapentin (NEURONTIN) 600 MG tablet TAKE ONE TABLET BY MOUTH THREE TIMES A DAY 90 tablet 3     ipratropium - albuterol 0.5 mg/2.5 mg/3 mL (DUONEB) 0.5-2.5 (3) MG/3ML neb solution Take 1 vial (3 mLs) by nebulization every 6 hours as needed for shortness of breath / dyspnea or wheezing 30 vial 1     lamoTRIgine (LAMICTAL) 100 MG tablet Take 1 tablet (100 mg) by mouth daily 30 tablet 0     levothyroxine (SYNTHROID/LEVOTHROID) 150 MCG tablet Take 1 tablet (150 mcg) by mouth daily 90 tablet 2     lidocaine (XYLOCAINE) 5 % ointment Apply topically 3 times daily as needed for moderate pain 240 g 1     mometasone-formoterol (DULERA) 200-5 MCG/ACT oral inhaler Inhale 2 puffs into the lungs 2 times daily 13 g 1     morphine (MSIR) 15 MG IR tablet Take 1 tablet (15 mg) by mouth every 6 hours as needed for severe pain 8 tablet 0     naloxone (NARCAN) 4 MG/0.1ML nasal spray Spray 1 spray (4 mg) into one nostril alternating nostrils once as needed for opioid reversal every 2-3 minutes until assistance arrives 0.2 mL 1     omeprazole (PRILOSEC) 20 MG DR capsule TAKE ONE CAPSULE BY MOUTH TWICE A  capsule 1     order for DME Equipment being ordered:x2 [employer] 30/40mmHg compression wraps with x2 extra prs of compression liners 1 each 0     order for DME Equipment being ordered: Nebulizer 1 Device 0     order  "for DME Equipment being ordered: CPAP  AIRSENSE 10  5-18 CM H20  SN# [id number]   DN# [id number]       order for DME Equipment being ordered: [id number]\" x2pair     \"20-30mmHg [place] Hybrid Liners\" x 2 pair 2 Units 11     order for DME Equipment being ordered: DEPENDS SIZE LARGE 60 Month 5     order for DME Equipment being ordered: INCONTINENCE PADS   QID 1 Month 11     potassium chloride SA (K-DUR/KLOR-CON M) 10 MEQ CR tablet TAKE ONE TABLET BY MOUTH ONE TIME DAILY 90 tablet 3     pramipexole (MIRAPEX) 0.125 MG tablet TAKE 1-2 TABLETS BY MOUTH AT BEDTIME 60 tablet 10     simvastatin (ZOCOR) 20 MG tablet TAKE ONE TABLET BY MOUTH AT BEDTIME 90 tablet 1     traZODone (DESYREL) 150 MG tablet Take 1 tablet (150 mg) by mouth At Bedtime 90 tablet 1     VITAMIN D3 1000 units tablet TAKE ONE TABLET BY MOUTH ONCE DAILY 100 tablet 0     warfarin (COUMADIN) 5 MG tablet Take 7.5mg every Mon, Fri; 10 mg all other days or As directed by Anticoagulation clinic 200 tablet 0     EPINEPHrine (EPIPEN/ADRENACLICK/OR ANY BX GENERIC EQUIV) 0.3 MG/0.3ML injection 2-pack Inject 0.3 mLs (0.3 mg) into the muscle once as needed for anaphylaxis (Patient not taking: Reported on 3/29/2019) 0.6 mL 0     Allergies   Allergen Reactions     Darvocet [Propoxyphene N-Apap] Anaphylaxis     Darvocet,Percocet      Percocet [Oxycodone-Acetaminophen] Anaphylaxis, Hives and Swelling     Has tolerated hydromorphone in the past.      Asa [Aspirin] Hives     Aspirin causes seizures and hives, throat swelling.   Has tolerated ketorolac in the past.        Bee      Ibuprofen Swelling     Throat swelling per patient      Lyrica [Pregabalin] Other (See Comments) and Swelling     dizziness     Povidone Iodine Rash     Reaction to topical betadine     Tape [Adhesive Tape] Rash         Reviewed and updated as needed this visit by Provider  Tobacco  Allergies  Meds  Problems  Med Hx  Surg Hx  Fam Hx         Review of Systems   ROS COMP: Constitutional, HEENT, " cardiovascular, pulmonary, GI, , musculoskeletal, neuro, skin, endocrine and psych systems are negative, except as otherwise noted.      Objective    /54   Pulse 98   Temp 98.6  F (37  C) (Tympanic)   Resp 28   Wt 107.5 kg (237 lb)   LMP 09/27/2009   SpO2 91%   BMI 49.53 kg/m    Body mass index is 49.53 kg/m .  Physical Exam   GENERAL: healthy, alert and no distress, nontoxic in appearance  EYES: Eyes grossly normal to inspection, PERRL and conjunctivae and sclerae normal  HENT: normocephalic  NECK: supple with full ROM  RESP: lungs clear to auscultation - no rales, rhonchi or wheezes  CV: regular rate and rhythm, normal S1 S2, no S3 or S4, no murmur, click or rub, no peripheral edema and peripheral pulses strong  ABDOMEN: soft, nontender, no hepatosplenomegaly, no masses and bowel sounds normal  MS: no gross musculoskeletal defects noted, right lower leg very red with one area that she states is where bug bit her that is weeping garcía serous fluid on front of right shin. Skin deep red just above and around ankle with lighter redness extending up right calf up to her posterior knee.    Diagnostic Test Results:  Labs reviewed in Epic  Results for orders placed or performed in visit on 06/06/19 (from the past 24 hour(s))   CBC with platelets and differential   Result Value Ref Range    WBC 13.8 (H) 4.0 - 11.0 10e9/L    RBC Count 6.29 (H) 3.8 - 5.2 10e12/L    Hemoglobin 18.8 (H) 11.7 - 15.7 g/dL    Hematocrit 59.4 (H) 35.0 - 47.0 %    MCV 94 78 - 100 fl    MCH 29.9 26.5 - 33.0 pg    MCHC 31.6 31.5 - 36.5 g/dL    RDW 21.0 (H) 10.0 - 15.0 %    Platelet Count 109 (L) 150 - 450 10e9/L    % Neutrophils 82.2 %    % Lymphocytes 12.0 %    % Monocytes 5.6 %    % Eosinophils 0.1 %    % Basophils 0.1 %    Absolute Neutrophil 11.3 (H) 1.6 - 8.3 10e9/L    Absolute Lymphocytes 1.7 0.8 - 5.3 10e9/L    Absolute Monocytes 0.8 0.0 - 1.3 10e9/L    Absolute Eosinophils 0.0 0.0 - 0.7 10e9/L    Absolute Basophils 0.0 0.0 -  0.2 10e9/L    Diff Method Automated Method      *Note: Due to a large number of results and/or encounters for the requested time period, some results have not been displayed. A complete set of results can be found in Results Review.           Assessment & Plan   Problem List Items Addressed This Visit     None      Visit Diagnoses     Cellulitis of right lower extremity    -  Primary    Relevant Orders    CBC with platelets and differential (Completed)       Cellulitis with numerous co-morbid conditions. Slightly elevated WBC with other abnormal values. Platelets low as well. Will send her to ER via private car for further evaluation and care.        Patient Instructions   Go directly to the [place] ER. They are expecting you.    Return for today - ER.    LEONIDES Boyer CNP  [facility]

## 2019-06-07 NOTE — PLAN OF CARE
Patient alert. Vital signs stable. On room air and afebrile. Up with stand by assist.      Redness and edema noted on right lower extremity. Redness expansion outlined. Patient received IV antibiotics in the ED. Leg elevated on pillow in bed. PRN tylenol given for pain.      Potassium level 2.8. MD paged; ordered one time dose of 40 mEq potassium chloride packet.     Patient did not sleep much overnight.

## 2019-06-07 NOTE — PHARMACY-ANTICOAGULATION SERVICE
Clinical Pharmacy - Warfarin Dosing Consult     Pharmacy has been consulted to manage this patient s warfarin therapy.  Indication: DVT/ PE Treatment(PVD / DVT)  Therapy Goal: INR 2-2.5  OP Anticoag Clinic: [facility]  Warfarin Prior to Admission: Yes  Warfarin PTA Regimen: 7.5 mg MonFri and 10 mg ROW  Recent documented change in oral intake/nutrition: Unknown  Dose Comments: INR of 2.33, will give home dose of 7.5 mg    INR   Date Value Ref Range Status   06/07/2019 2.33 (H) 0.86 - 1.14 Final     Comment:     Special tube used to correct for high hematocrit   06/06/2019 2.13 (H) 0.86 - 1.14 Final       Recommend warfarin 7.5 mg today.  Pharmacy will monitor Bia Bill daily and order warfarin doses to achieve specified goal.      Please contact pharmacy as soon as possible if the warfarin needs to be held for a procedure or if the warfarin goals change.
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
## 2019-06-07 NOTE — PLAN OF CARE
"[facility] ADMISSION NOTE    Patient admitted to room 2305 at approximately 2355 via cart from emergency room. Patient was accompanied by transport tech.     Verbal SBAR report received from TAWANA Amezcua prior to patient arrival.     Patient trasferred to bed via self. Patient alert and oriented X 3. Pain is controlled without any medications. 0-10 Pain Scale: 9. Admission vital signs: Blood pressure 117/55, pulse 82, temperature 98.8  F (37.1  C), temperature source Oral, resp. rate 16, height 1.499 m (4' 11\"), weight 104 kg (229 lb 4.5 oz), last menstrual period 09/27/2009, SpO2 94 %, not currently breastfeeding. Patient was oriented to plan of care, call light, bed controls, tv, telephone, bathroom and visiting hours.     Risk Assessment    The following safety risks were identified during admission: fall. Yellow risk band applied: YES.     Skin Initial Assessment    This writer admitted this patient and completed a full skin assessment and Brandon score in the Adult PCS flowsheet. Appropriate interventions initiated as needed.     Secondary skin check completed by TAWANA Corbin.    Brandon Risk Assessment  Sensory Perception: 4-->no impairment  Moisture: 3-->occasionally moist  Activity: 3-->walks occasionally  Mobility: 3-->slightly limited  Nutrition: 3-->adequate  Friction and Shear: 3-->no apparent problem  Brandon Score: 19  Bed Support Surface: Atmos Air mattress  Reassessed using Bed Algorithm: No    Skin on right lower extremity reddened and edematous. Puncture wound on right upper thigh; per patient from dog's claw. Bug bites and lap sited on abdomen. Heels callused. Groin and buttock pink and moist.     Lima Balderas"

## 2019-06-07 NOTE — PROGRESS NOTES
Skin affirmation note    Admitting nurse completed full skin assessment, Brandon score and Brandon interventions. This writer agrees with the initial skin assessment findings.

## 2019-06-07 NOTE — ED NOTES
"Patient has  [place] to Observation  order. Patient has been given the Observation brochure -  What does Observation mean to me.\"  Patient has been given the opportunity to ask questions about observation status and their plan of care.      MINE CHAWLA    "

## 2019-06-07 NOTE — H&P
[facility]    History and Physical - Hospitalist Service       Date of Admission:  6/6/2019    Assessment & Plan   Bia Bill is a 53 year old female admitted on 6/6/2019. She has history of bipolar, erythrocytosis, DVT, PVD s/p stenting, gout, GILES, obesity hypoventilation, COPD and hypothyroidism. She presents with spreading redness and pain following a bug bite 2 days prior.    Cellulitis of the Right Lower extremity   Bug bite 2 days ago with worsening erythema, warmth, tenderness of the right lower extremity as well as some pruritis. Serous drainage initially, no purulence reported. Venous US negative for DVT. Afebrile, vital signs stable, no systemic signs of illness, WBC initially 13.8. Received one dose of ceftriaxone in ED. Erythema has spread to encompass thigh as well following presentation, re-outlined last night and now just beyond margin in one area.  - admit to inpatient  - antibiotics: change to IV cefazolin  - follow CBC, blood cultures  - monitor outlined area  - pain management: acetaminophen, prn hydromorphone (allergy to oxycodone though tolerated hydromorphone in the past discussed with patient, agreeable to try and monitor for symptoms)    Peripheral Vascular Disease  Noted in history of stenting in the left leg.  - continue home warfarin, statin      Bipolar Disorder / Depression  Follows with psychology, [facility].   - continue home Lamictal, Abilify, bupropion, trazodone     Erythrocytosis  Follows with hematology, Dr. Watts, history of phlebotomy in the past (2011) to keep Hgb < 20, possibly Hb [place] based on previous work up.  - continue outpatient follow up    Gout  Chronic.  - continue home allopurinol    Gastroesophageal reflux disease  Chronic.  - continue home omeprazole    Polyneuropathy  Chronic.  - continue home gabapentin    History of DVT  Occurred in 2004 in her leg. Managed on warfarin.  - continue home warfarin, pharmacy  to dose    Hypothyroidism  Chronic.   - continue home levothyroxine    COPD / Asthma  Tobacco use  Lungs clear on presentation.  - continue home nebulizer treatments prn, Dulera     Restless Legs  Chronic.  - continue home pramipexole    Morbid Obesity  Hypoventilation, GILES  Chronic. Managed with home CPAP.  - continue if someone is able to bring in or use hospital       Diet: Regular Diet Adult    DVT Prophylaxis: Warfarin  Goldsmith Catheter: not present  Code Status: DNR, ok to intubate    Disposition Plan   Expected discharge: likely 2 days, recommended to prior living arrangement once adequate pain management/ tolerating PO medications and antibiotic plan established.  Entered: Ayanna Proctor PA-C 06/07/2019, 8:20 AM     The patient's care was discussed with the Attending Physician, Dr. Dre Engle and Patient.    Ayanna Proctor PA-C  [facility]  ______________________________________________________________________    Chief Complaint   Bug bite, redness    History is obtained from the patient    History of Present Illness   Bia Bill is a 53 year old female who presents after a bug bite.    She felt like something bit her on Wednesday. She noticed pain and itching initially. Redness developed surrounding the area and then continued to spread on her leg. She has not had any fevers or chills but has had a poor appetite with mild nausea. She has not really felt ill. She has had some diarrhea since that time. She went to her PT which she has been attending for hip pain following a fall. Her PT noted increased redness and felt she should be evaluated. She initially presented to the urgent care but was sent to the ED for further evaluation.     Review of Systems    The 10 point Review of Systems is negative other than noted in the HPI or here.   - Chronic cough, unchanged, longtime smoker / COPD    Past Medical History    I have reviewed this patient's medical history and  updated it with pertinent information if needed.   Past Medical History:   Diagnosis Date     Acromioclavicular joint arthritis 1/25/2017     Acute bronchospasm 8/15/2016     Acute gouty arthritis 8/4/2014     Anxiety state, unspecified      Bipolar I disorder, most recent episode (or current) unspecified      Closed fracture of metatarsal bone 6/5/2007     Depressive disorder, not elsewhere classified      DVT, lower extremity (H)      Headache 10/17/2014     Impingement syndrome of shoulder region 1/25/2017     Polycythemia, secondary     [place] HGB      Right shoulder pain 10/14/2014     Trochanteric bursitis of both hips 1/11/2016     Past Surgical History   I have reviewed this patient's surgical history and updated it with pertinent information if needed.  Past Surgical History:   Procedure Laterality Date     BILIARY STENT SINGLE USE COV      3 stints in left leg     BONE MARROW BIOPSY, BONE SPECIMEN, NEEDLE/TROCAR N/A 11/17/2014    Procedure: BIOPSY BONE MARROW;  Surgeon: Hay Aaron MD;  Location: [facility]     D & C  10/26/09    with uterine ablation     ESOPHAGOSCOPY, GASTROSCOPY, DUODENOSCOPY (EGD), COMBINED  4/21/2014    Procedure: Gastroscopy;  Surgeon: Moris Thomas MD;  Location: [facility]     HYSTERECTOMY, PAP NO LONGER INDICATED  1-4-2010     LAPAROSCOPIC CHOLECYSTECTOMY N/A 1/4/2019    Procedure: Laparoscopic cholecystectomy;  Surgeon: Aaron Siegel MD;  Location: [facility]     LITHOTRIPSY  2004    Lithotrypsy     Social History   I have reviewed this patient's social history and updated it with pertinent information if needed.   Lives in [place], currently has a friend staying with her since she fell recently.   Social History     Tobacco Use     Smoking status: Current Every Day Smoker     Packs/day: 0.25     Years: 34.00     Pack years: 8.50     Types: Cigarettes     Smokeless tobacco: Never Used     Tobacco comment: started at age 11.  Quit during pregnancyX4.  Quit 3 months  ago.    Substance Use Topics     Alcohol use: No     Alcohol/week: 0.0 oz     Comment: quit 1995     Drug use: No     Comment: past hx 22 years ago/ uppers downrohit, cansheldon     Family History   I have reviewed this patient's family history and updated it with pertinent information if needed.   Family History   Problem Relation Age of Onset     Hypertension Mother      Diabetes Mother      Blood Disease Mother      Heart Disease Mother         chf     Cerebrovascular Disease Mother      Hypertension Father      Cancer Father         lung cancer     Allergies Sister      Diabetes Sister      Depression Sister      Hypertension Sister      Prior to Admission Medications   Prior to Admission Medications   Prescriptions Last Dose Informant Patient Reported? Taking?   ARIPiprazole (ABILIFY) 10 MG tablet   No No   Sig: TAKE ONE TABLET BY MOUTH EVERY DAY   EPINEPHrine (EPIPEN/ADRENACLICK/OR ANY BX GENERIC EQUIV) 0.3 MG/0.3ML injection 2-pack   No No   Sig: Inject 0.3 mLs (0.3 mg) into the muscle once as needed for anaphylaxis   Patient not taking: Reported on 3/29/2019   VITAMIN D3 1000 units tablet   No No   Sig: TAKE ONE TABLET BY MOUTH ONCE DAILY   acetaminophen (TYLENOL) 500 MG tablet  Self Yes No   Sig: Take 500-1,000 mg by mouth every 6 hours as needed for mild pain   albuterol (2.5 MG/3ML) 0.083% neb solution  Self No No   Sig: Take 1 vial (2.5 mg) by nebulization every 6 hours as needed for shortness of breath / dyspnea or wheezing   albuterol (PROAIR HFA/PROVENTIL HFA/VENTOLIN HFA) 108 (90 BASE) MCG/ACT Inhaler  Self No No   Sig: Inhale 2 puffs into the lungs every 6 hours as needed for shortness of breath / dyspnea or wheezing   allopurinol (ZYLOPRIM) 300 MG tablet   No No   Sig: Take 1 tablet (300 mg) by mouth daily   buPROPion (WELLBUTRIN XL) 150 MG 24 hr tablet   No No   Sig: TAKE ONE TABLET BY MOUTH EVERY DAY IN THE MORNING.   furosemide (LASIX) 20 MG tablet   No No   Sig: TAKE TWO TABLETS BY MOUTH TWICE DAILY  "  gabapentin (NEURONTIN) 300 MG capsule   No No   Sig: TAKE ONE CAPSULE BY MOUTH ONCE DAILY AT BEDTIME WITH A 600 MG TABLET FOR A TOTAL DOSE  MG   gabapentin (NEURONTIN) 300 MG capsule   No No   Sig: ONE CAPSULE BY MOUTH AT BEDTIME WITH 600 MG CAPSULE- FOR TOTAL  MG AT BEDTIME   gabapentin (NEURONTIN) 600 MG tablet   No No   Sig: TAKE ONE TABLET BY MOUTH THREE TIMES A DAY   gabapentin (NEURONTIN) 600 MG tablet   No No   Sig: TAKE ONE TABLET BY MOUTH THREE TIMES A DAY   ipratropium - albuterol 0.5 mg/2.5 mg/3 mL (DUONEB) 0.5-2.5 (3) MG/3ML neb solution   No No   Sig: Take 1 vial (3 mLs) by nebulization every 6 hours as needed for shortness of breath / dyspnea or wheezing   lamoTRIgine (LAMICTAL) 100 MG tablet   No No   Sig: Take 1 tablet (100 mg) by mouth daily   levothyroxine (SYNTHROID/LEVOTHROID) 150 MCG tablet   No No   Sig: Take 1 tablet (150 mcg) by mouth daily   lidocaine (XYLOCAINE) 5 % ointment   No No   Sig: Apply topically 3 times daily as needed for moderate pain   mometasone-formoterol (DULERA) 200-5 MCG/ACT oral inhaler   No No   Sig: Inhale 2 puffs into the lungs 2 times daily   morphine (MSIR) 15 MG IR tablet   No No   Sig: Take 1 tablet (15 mg) by mouth every 6 hours as needed for severe pain   naloxone (NARCAN) 4 MG/0.1ML nasal spray   No No   Sig: Spray 1 spray (4 mg) into one nostril alternating nostrils once as needed for opioid reversal every 2-3 minutes until assistance arrives   omeprazole (PRILOSEC) 20 MG DR capsule   No No   Sig: TAKE ONE CAPSULE BY MOUTH TWICE A DAY   order for DME  Self No No   Sig: Equipment being ordered: INCONTINENCE PADS   QID   order for DME  Self No No   Sig: Equipment being ordered: DEPENDS SIZE LARGE   order for DME  Self No No   Sig: Equipment being ordered: [id number]\" x2pair     \"20-30mmHg [place] Hybrid Liners\" x 2 pair   order for DME  Self Yes No   Sig: Equipment being ordered: CPAP  AIRSENSE 10  5-18 CM H20  SN# [id number]   DN# [id number]   order " for DME   No No   Sig: Equipment being ordered: Nebulizer   order for DME   No No   Sig: Equipment being ordered:x2 [employer] 30/40mmHg compression wraps with x2 extra prs of compression liners   potassium chloride SA (K-DUR/KLOR-CON M) 10 MEQ CR tablet   No No   Sig: TAKE ONE TABLET BY MOUTH ONE TIME DAILY   pramipexole (MIRAPEX) 0.125 MG tablet   No No   Sig: TAKE 1-2 TABLETS BY MOUTH AT BEDTIME   simvastatin (ZOCOR) 20 MG tablet   No No   Sig: TAKE ONE TABLET BY MOUTH AT BEDTIME   traZODone (DESYREL) 150 MG tablet   No No   Sig: Take 1 tablet (150 mg) by mouth At Bedtime   warfarin (COUMADIN) 5 MG tablet   Yes No   Sig: Take 7.5mg every Mon, Fri; 10 mg all other days or As directed by Anticoagulation clinic      Facility-Administered Medications Last Administration Doses Remaining   hylan (SYNVISC ONE) injection 48 mg 7/10/2018  2:40 PM         Allergies   Allergies   Allergen Reactions     Darvocet [Propoxyphene N-Apap] Anaphylaxis     Darvocet,Percocet      Percocet [Oxycodone-Acetaminophen] Anaphylaxis, Hives and Swelling     Has tolerated hydromorphone in the past.      Asa [Aspirin] Hives     Aspirin causes seizures and hives, throat swelling.   Has tolerated ketorolac in the past.        Bee      Ibuprofen Swelling     Throat swelling per patient      Lyrica [Pregabalin] Other (See Comments) and Swelling     dizziness     Povidone Iodine Rash     Reaction to topical betadine     Tape [Adhesive Tape] Rash     Physical Exam   Vital Signs: Temp: 98.2  F (36.8  C) Temp src: Oral BP: 133/52 Pulse: 82 Heart Rate: 87 Resp: 16 SpO2: 91 % O2 Device: None (Room air)    Weight: 229 lbs 4.45 oz    Constitutional: sitting up comfortably in bed, well-appearing, awake, alert, cooperative, no apparent distress, and appears older than stated age  Eyes: Lids and lashes normal, extra ocular muscles intact, sclera clear, conjunctiva normal  ENT: Normocephalic, without obvious abnormality, atraumatic, oral pharynx with moist  mucous membranes.  Hematologic / Lymphatic: no cervical lymphadenopathy and no supraclavicular lymphadenopathy  Respiratory: No increased work of breathing, good air exchange, few scattered coarse breath sounds otherwise clear to auscultation bilaterally, no crackles or wheezing  Cardiovascular:  regular rate and rhythm, and no murmur noted. Trace lower extremity edema bilaterally. Radial pulses 2+.  GI: Obese, normal bowel sounds, soft, non-distended, non-tender  Genitounirinary: Deferred  Skin:   Right lower extremity with darkened erythema some areas with petechial appearance and light erythema spreading up thing mainly on the lateral thigh, one scabbed area on shin, no drainage, induration or fluctuance.  Chronic erythema noted on the left lower extremity shin.  Musculoskeletal: Moves all 4 extremities appropriately. Normal bulk and tone.  Neurologic: Awake, alert, oriented to name, place and time.  Neuropsychiatric: calm, appropriate, normal thought process/content, short term memory appears intact    Data   Data reviewed today: I reviewed all medications, new labs and imaging results over the last 24 hours. I personally reviewed no images or EKG's today.    Recent Labs   Lab 06/07/19  0537 06/06/19  2201 06/06/19  1939 06/03/19  1101   WBC 10.7  --  13.8* 5.4   HGB 17.7*  --  18.8* 19.9*   MCV 95  --  94 94   *  --  109* 109*   INR  --  2.13*  --  2.97*    138  --   --    POTASSIUM 3.5 2.8*  --   --    CHLORIDE 99 99  --   --    CO2 34* 32  --   --    BUN 10 9  --   --    CR 0.73 0.75  --   --    ANIONGAP 6 7  --   --    HOOD 9.2 9.1  --   --    * 125*  --   --      Recent Results (from the past 24 hour(s))   US Lower Extremity Venous Duplex Right    Narrative    VENOUS ULTRASOUND RIGHT LEG  6/6/2019 10:26 PM     HISTORY: pain, swelling, erythema, warmth    COMPARISON: Scan dated 6/1/2015    FINDINGS:  Examination of the deep veins with graded compression and  color flow Doppler with  spectral wave form analysis shows no evidence  of thrombus in the common femoral vein, femoral vein, popliteal vein  or calf veins.  There is no venous insufficiency.      Impression    IMPRESSION: No evidence of deep venous thrombosis in the right lower  extremity.    EMMY HOLLOWAY MD

## 2019-06-07 NOTE — PLAN OF CARE
Pt resting in bed with RLE elevated on pillow. Up in chair with legs dangled for short time also. Pt A & O. Eating & drinking well. Redness is not within markings, MD aware from rounding. RLE red, warm, splotchy. VSS, afebrile. Uses call light. Bia Boo RN BSN

## 2019-06-07 NOTE — ED NOTES
DATE:  6/6/2019   TIME OF RECEIPT FROM LAB:  6510  LAB TEST:  Lactic acid   LAB VALUE:  2.2  RESULTS GIVEN WITH READ-BACK TO (PROVIDER):  Yosef Cassidy MD  TIME LAB VALUE REPORTED TO PROVIDER:   8154

## 2019-06-07 NOTE — ED NOTES
Pt has redness on R lower leg, says she was sitting at the dinner table yesterday and felt something bite her. One small area is open and draining serous type fluid. Pt was seen earlier at the clinic/UC, area was outlined with marker and has increased since then.

## 2019-06-08 VITALS
RESPIRATION RATE: 16 BRPM | WEIGHT: 229.28 LBS | DIASTOLIC BLOOD PRESSURE: 59 MMHG | HEIGHT: 59 IN | OXYGEN SATURATION: 94 % | HEART RATE: 82 BPM | TEMPERATURE: 98.6 F | BODY MASS INDEX: 46.22 KG/M2 | SYSTOLIC BLOOD PRESSURE: 118 MMHG

## 2019-06-08 LAB — INR PPP: 2.28 (ref 0.86–1.14)

## 2019-06-08 PROCEDURE — 99239 HOSP IP/OBS DSCHRG MGMT >30: CPT | Performed by: FAMILY MEDICINE

## 2019-06-08 PROCEDURE — 36415 COLL VENOUS BLD VENIPUNCTURE: CPT | Performed by: FAMILY MEDICINE

## 2019-06-08 PROCEDURE — 36415 COLL VENOUS BLD VENIPUNCTURE: CPT | Performed by: PHYSICIAN ASSISTANT

## 2019-06-08 PROCEDURE — 25000132 ZZH RX MED GY IP 250 OP 250 PS 637: Performed by: PHYSICIAN ASSISTANT

## 2019-06-08 PROCEDURE — 85610 PROTHROMBIN TIME: CPT | Performed by: FAMILY MEDICINE

## 2019-06-08 PROCEDURE — 25000128 H RX IP 250 OP 636: Performed by: PHYSICIAN ASSISTANT

## 2019-06-08 RX ORDER — HYDROMORPHONE HYDROCHLORIDE 2 MG/1
1-2 TABLET ORAL
Qty: 10 TABLET | Refills: 0 | Status: SHIPPED | OUTPATIENT
Start: 2019-06-08 | End: 2019-11-03

## 2019-06-08 RX ORDER — WARFARIN SODIUM 5 MG/1
10 TABLET ORAL
Status: DISCONTINUED | OUTPATIENT
Start: 2019-06-08 | End: 2019-06-08 | Stop reason: HOSPADM

## 2019-06-08 RX ORDER — CEPHALEXIN 500 MG/1
500 CAPSULE ORAL 3 TIMES DAILY
Qty: 21 CAPSULE | Refills: 0 | Status: SHIPPED | OUTPATIENT
Start: 2019-06-08 | End: 2019-06-12

## 2019-06-08 RX ADMIN — CEFAZOLIN SODIUM 1 G: 1 INJECTION, SOLUTION INTRAVENOUS at 13:55

## 2019-06-08 RX ADMIN — GABAPENTIN 600 MG: 600 TABLET, FILM COATED ORAL at 08:48

## 2019-06-08 RX ADMIN — LAMOTRIGINE 100 MG: 100 TABLET ORAL at 08:51

## 2019-06-08 RX ADMIN — LEVOTHYROXINE SODIUM 150 MCG: 150 TABLET ORAL at 05:58

## 2019-06-08 RX ADMIN — POTASSIUM CHLORIDE 10 MEQ: 750 TABLET, FILM COATED, EXTENDED RELEASE ORAL at 08:51

## 2019-06-08 RX ADMIN — CEFAZOLIN SODIUM 1 G: 1 INJECTION, SOLUTION INTRAVENOUS at 05:58

## 2019-06-08 RX ADMIN — GABAPENTIN 600 MG: 600 TABLET, FILM COATED ORAL at 13:55

## 2019-06-08 RX ADMIN — Medication 1 MG: at 02:24

## 2019-06-08 RX ADMIN — Medication 1 MG: at 12:04

## 2019-06-08 RX ADMIN — FUROSEMIDE 40 MG: 40 TABLET ORAL at 08:48

## 2019-06-08 RX ADMIN — ALLOPURINOL 300 MG: 300 TABLET ORAL at 08:48

## 2019-06-08 RX ADMIN — FLUTICASONE FUROATE AND VILANTEROL TRIFENATATE 1 PUFF: 200; 25 POWDER RESPIRATORY (INHALATION) at 08:48

## 2019-06-08 RX ADMIN — OMEPRAZOLE 20 MG: 20 CAPSULE, DELAYED RELEASE ORAL at 05:58

## 2019-06-08 ASSESSMENT — ACTIVITIES OF DAILY LIVING (ADL)
ADLS_ACUITY_SCORE: 13

## 2019-06-08 NOTE — PLAN OF CARE
Pt up with SBA & walker.  Requested & received dilaudid 1mg po for pain in RLE. Redness is within the marked areas, both upper thigh & lower leg. Trace edema. Temp max 99.6 oral.

## 2019-06-08 NOTE — PROGRESS NOTES
[facility] DISCHARGE NOTE    Patient discharged to home at 4:35 PM via wheel chair. Accompanied by spouse and staff. Discharge instructions reviewed with patient, opportunity offered to ask questions. Prescriptions sent to patients preferred pharmacy. All belongings sent with patient.    Allyn Alaniz

## 2019-06-08 NOTE — PLAN OF CARE
Patient A & O. Up with stand by assist and walker, ambulated in the hallway. Gave Dilaudid 1 mg for pain with good results. RLE elevated. Redness within markings.

## 2019-06-08 NOTE — PROGRESS NOTES
Up up with stand by assist and walker for safety. Dilaudid 1 mg oral given once this shift. Redness within marked areas.

## 2019-06-10 ENCOUNTER — TELEPHONE (OUTPATIENT)
Dept: FAMILY MEDICINE | Facility: CLINIC | Age: 54
End: 2019-06-10

## 2019-06-10 NOTE — TELEPHONE ENCOUNTER
ED/UC/IP follow up phone call:    RN please call to follow up.    Number of ED visits in past 12 months = 1/1

## 2019-06-11 NOTE — TELEPHONE ENCOUNTER
Hospital/TCU/ED for chronic condition Discharge Protocol    Patient has appointment with Dr. Leong tomorrow.  Mundo Conde RN

## 2019-06-12 ENCOUNTER — OFFICE VISIT (OUTPATIENT)
Dept: FAMILY MEDICINE | Facility: CLINIC | Age: 54
End: 2019-06-12
Payer: COMMERCIAL

## 2019-06-12 VITALS
HEART RATE: 77 BPM | WEIGHT: 240.3 LBS | BODY MASS INDEX: 50.44 KG/M2 | SYSTOLIC BLOOD PRESSURE: 131 MMHG | DIASTOLIC BLOOD PRESSURE: 60 MMHG | RESPIRATION RATE: 20 BRPM | OXYGEN SATURATION: 94 % | TEMPERATURE: 98.5 F | HEIGHT: 58 IN

## 2019-06-12 DIAGNOSIS — L03.115 CELLULITIS OF RIGHT LOWER EXTREMITY: ICD-10-CM

## 2019-06-12 DIAGNOSIS — G25.81 RESTLESS LEG SYNDROME: ICD-10-CM

## 2019-06-12 DIAGNOSIS — T78.2XXD ANAPHYLACTIC REACTION TO BEE STING, UNDETERMINED INTENT, SUBSEQUENT ENCOUNTER: ICD-10-CM

## 2019-06-12 DIAGNOSIS — F33.1 MAJOR DEPRESSIVE DISORDER, RECURRENT EPISODE, MODERATE (H): ICD-10-CM

## 2019-06-12 DIAGNOSIS — D69.6 THROMBOCYTOPENIA (H): ICD-10-CM

## 2019-06-12 DIAGNOSIS — T63.444D ANAPHYLACTIC REACTION TO BEE STING, UNDETERMINED INTENT, SUBSEQUENT ENCOUNTER: ICD-10-CM

## 2019-06-12 DIAGNOSIS — E78.00 HYPERCHOLESTEREMIA: ICD-10-CM

## 2019-06-12 PROCEDURE — 99214 OFFICE O/P EST MOD 30 MIN: CPT | Performed by: FAMILY MEDICINE

## 2019-06-12 RX ORDER — PRAMIPEXOLE DIHYDROCHLORIDE 0.12 MG/1
TABLET ORAL
Qty: 60 TABLET | Refills: 10 | Status: SHIPPED | OUTPATIENT
Start: 2019-06-12 | End: 2020-05-26

## 2019-06-12 RX ORDER — TRAZODONE HYDROCHLORIDE 150 MG/1
150 TABLET ORAL AT BEDTIME
Qty: 90 TABLET | Refills: 1 | Status: ON HOLD | OUTPATIENT
Start: 2019-06-12 | End: 2019-12-10

## 2019-06-12 RX ORDER — CEPHALEXIN 500 MG/1
500 CAPSULE ORAL 3 TIMES DAILY
Qty: 21 CAPSULE | Refills: 0 | Status: SHIPPED | OUTPATIENT
Start: 2019-06-12 | End: 2019-09-18

## 2019-06-12 RX ORDER — EPINEPHRINE 0.3 MG/.3ML
0.3 INJECTION SUBCUTANEOUS
Qty: 0.6 ML | Refills: 0 | Status: SHIPPED | OUTPATIENT
Start: 2019-06-12 | End: 2020-05-15

## 2019-06-12 RX ORDER — LAMOTRIGINE 100 MG/1
100 TABLET ORAL DAILY
Qty: 90 TABLET | Refills: 3 | Status: SHIPPED | OUTPATIENT
Start: 2019-06-12 | End: 2020-05-26

## 2019-06-12 ASSESSMENT — MIFFLIN-ST. JEOR: SCORE: 1589.74

## 2019-06-12 ASSESSMENT — PAIN SCALES - GENERAL: PAINLEVEL: SEVERE PAIN (7)

## 2019-06-12 NOTE — TELEPHONE ENCOUNTER
"SIMVASTATIN 20MG TABS      Last Written Prescription Date:  12/5/18  Last Fill Quantity: 90,   # refills: 1  Last Office Visit: 6/12/19  Future Office visit:    Next 5 appointments (look out 90 days)    Jun 19, 2019 10:20 AM CDT  [facility]d [facility] [address]  [facility] [address]  [phone]   Jun 20, 2019  8:30 AM CDT  Return Visit with Kd Morris  [facility] [address] [facility] [address]  [phone]   Jul 01, 2019 10:30 AM CDT  Return Visit with Kd Morris  [facility] [address] [address]  [phone]           Requested Prescriptions   Pending Prescriptions Disp Refills     simvastatin (ZOCOR) 20 MG tablet [Pharmacy Med Name: SIMVASTATIN 20MG TABS] 120 tablet 0     Sig: TAKE ONE TABLET BY MOUTH ONCE DAILY AT BEDTIME       Statins Protocol Failed - 6/12/2019  3:05 PM        Failed - LDL on file in past 12 months     Recent Labs   Lab Test 04/06/18  1316   *             Passed - No abnormal creatine kinase in past 12 months     Recent Labs   Lab Test 11/30/15  1508                   Passed - Recent (12 mo) or future (30 days) visit within the authorizing provider's specialty     Patient had office visit in the last 12 months or has a visit in the next 30 days with authorizing provider or within the authorizing provider's specialty.  See \"Patient Info\" tab in inbasket, or \"Choose Columns\" in Meds & Orders section of the refill encounter.              Passed - Medication is active on med list        Passed - Patient is age 18 or older        Passed - No active pregnancy on record        Passed - No positive pregnancy test in past 12 months          "

## 2019-06-12 NOTE — PROGRESS NOTES
SUBJECTIVE:                                                    Bia Bill is a 53 year old female who presents to clinic today for the following health issues:    ED/UC Followup:    Facility:  [facility]  Date of visit: 06/06/2019  Reason for visit: Cellulitis of right lower leg  Current Status: She says it is better. Still red and tender.      Problem list and histories reviewed & adjusted, as indicated.  Additional history:     Patient Active Problem List   Diagnosis     Mild persistent asthma     Polyneuropathy in other diseases classified elsewhere (H)     DVT (deep venous thrombosis) (H)     Alcohol abuse, in remission     SECONDARY POLYCYTHEMIA     Chondromalacia of patella     Developmental reading disorder     Esophageal reflux     Hypothyroidism     Fatty liver     GILES (Obstructive Sleep Apnea)-Moderate (AHI 16)     RLS (restless legs syndrome)     Smoker     Erythrocytosis     Moderate mixed bipolar I disorder (H)     Personality disorder, depressive     COPD (chronic obstructive pulmonary disease) (H)     Rosacea     Health Care Home     DDD (degenerative disc disease), cervical     Benign neoplasm of colon (POLYPOSIS)     Stroke (H)     Somatization disorder     Sebaceous cyst of right axilla     HTN, goal below 140/90     Left leg pain     Vitamin D deficiency     Morbid obesity (H)     PVD (peripheral vascular disease) with claudication (H)     Cyst of left ovary     Morbid obesity with alveolar hypoventilation (H)     Severe episode of recurrent major depressive disorder (H)     Conductive hearing loss of left ear with restricted hearing of right ear     Sensorineural hearing loss (SNHL) of right ear with restricted hearing of left ear     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     Cellulitis     Past Surgical History:   Procedure Laterality Date     BILIARY STENT SINGLE USE COV      3 stints in left leg     BONE MARROW BIOPSY, BONE  SPECIMEN, NEEDLE/TROCAR N/A 11/17/2014    Procedure: BIOPSY BONE MARROW;  Surgeon: Hay Aaron MD;  Location: [facility]     D & C  10/26/09    with uterine ablation     ESOPHAGOSCOPY, GASTROSCOPY, DUODENOSCOPY (EGD), COMBINED  4/21/2014    Procedure: Gastroscopy;  Surgeon: Moris Thomas MD;  Location: [facility]     HYSTERECTOMY, PAP NO LONGER INDICATED  1-4-2010     LAPAROSCOPIC CHOLECYSTECTOMY N/A 1/4/2019    Procedure: Laparoscopic cholecystectomy;  Surgeon: Aaron Siegel MD;  Location: [facility]     LITHOTRIPSY  2004    Lithotrypsy       Social History     Tobacco Use     Smoking status: Current Every Day Smoker     Packs/day: 0.50     Years: 34.00     Pack years: 17.00     Types: Cigarettes     Smokeless tobacco: Never Used     Tobacco comment: started at age 11.  Quit during pregnancyX4.  Quit 3 months ago.    Substance Use Topics     Alcohol use: No     Alcohol/week: 0.0 oz     Comment: quit 1995     Family History   Problem Relation Age of Onset     Hypertension Mother      Diabetes Mother      Blood Disease Mother      Heart Disease Mother         chf     Cerebrovascular Disease Mother      Hypertension Father      Cancer Father         lung cancer     Allergies Sister      Diabetes Sister      Depression Sister      Hypertension Sister          Current Outpatient Medications   Medication Sig Dispense Refill     acetaminophen (TYLENOL) 500 MG tablet Take 500-1,000 mg by mouth every 6 hours as needed for mild pain       albuterol (2.5 MG/3ML) 0.083% neb solution Take 1 vial (2.5 mg) by nebulization every 6 hours as needed for shortness of breath / dyspnea or wheezing 1 Box 0     albuterol (PROAIR HFA/PROVENTIL HFA/VENTOLIN HFA) 108 (90 BASE) MCG/ACT Inhaler Inhale 2 puffs into the lungs every 6 hours as needed for shortness of breath / dyspnea or wheezing 3 Inhaler 1     allopurinol (ZYLOPRIM) 300 MG tablet Take 1 tablet (300 mg) by mouth daily 90 tablet 3     ARIPiprazole (ABILIFY) 10 MG tablet  TAKE ONE TABLET BY MOUTH EVERY DAY 90 tablet 1     buPROPion (WELLBUTRIN XL) 150 MG 24 hr tablet TAKE ONE TABLET BY MOUTH EVERY DAY IN THE MORNING. (Patient taking differently: TAKE ONE TABLET BY MOUTH EVERY DAY IN THE EVENING) 30 tablet 5     cephALEXin (KEFLEX) 500 MG capsule Take 1 capsule (500 mg) by mouth 3 times daily 21 capsule 0     EPINEPHrine (EPIPEN/ADRENACLICK/OR ANY BX GENERIC EQUIV) 0.3 MG/0.3ML injection 2-pack Inject 0.3 mLs (0.3 mg) into the muscle once as needed for anaphylaxis 0.6 mL 0     furosemide (LASIX) 20 MG tablet TAKE TWO TABLETS BY MOUTH TWICE DAILY 360 tablet 3     gabapentin (NEURONTIN) 300 MG capsule TAKE ONE CAPSULE BY MOUTH ONCE DAILY AT BEDTIME WITH A 600 MG TABLET FOR A TOTAL DOSE  MG 90 capsule 0     gabapentin (NEURONTIN) 600 MG tablet TAKE ONE TABLET BY MOUTH THREE TIMES A DAY 90 tablet 0     HYDROmorphone (DILAUDID) 2 MG tablet Take 0.5-1 tablets (1-2 mg) by mouth every 3 hours as needed for moderate to severe pain 10 tablet 0     ipratropium - albuterol 0.5 mg/2.5 mg/3 mL (DUONEB) 0.5-2.5 (3) MG/3ML neb solution Take 1 vial (3 mLs) by nebulization every 6 hours as needed for shortness of breath / dyspnea or wheezing 30 vial 1     lamoTRIgine (LAMICTAL) 100 MG tablet Take 1 tablet (100 mg) by mouth daily 90 tablet 3     levothyroxine (SYNTHROID/LEVOTHROID) 150 MCG tablet Take 1 tablet (150 mcg) by mouth daily 90 tablet 2     mometasone-formoterol (DULERA) 200-5 MCG/ACT oral inhaler Inhale 2 puffs into the lungs 2 times daily 13 g 1     omeprazole (PRILOSEC) 20 MG DR capsule TAKE ONE CAPSULE BY MOUTH TWICE A  capsule 1     order for DME Equipment being ordered:x2 [employer] 30/40mmHg compression wraps with x2 extra prs of compression liners 1 each 0     order for DME Equipment being ordered: Nebulizer 1 Device 0     order for DME Equipment being ordered: CPAP  AIRSENSE 10  5-18 CM H20  SN# [id number]   DN# [id number]       order for DME Equipment being ordered:  "[id number]\" x2pair     \"20-30mmHg [facility] Hybrid Liners\" x 2 pair 2 Units 11     order for DME Equipment being ordered: DEPENDS SIZE LARGE 60 Month 5     order for DME Equipment being ordered: INCONTINENCE PADS   QID 1 Month 11     potassium chloride SA (K-DUR/KLOR-CON M) 10 MEQ CR tablet TAKE ONE TABLET BY MOUTH ONE TIME DAILY 90 tablet 3     pramipexole (MIRAPEX) 0.125 MG tablet TAKE 1-2 TABLETS BY MOUTH AT BEDTIME 60 tablet 10     traZODone (DESYREL) 150 MG tablet Take 1 tablet (150 mg) by mouth At Bedtime 90 tablet 1     VITAMIN D3 1000 units tablet TAKE ONE TABLET BY MOUTH ONCE DAILY 100 tablet 0     warfarin (COUMADIN) 5 MG tablet Take 7.5mg every Mon, wed, Fri; 10 mg all other days or As directed by Anticoagulation clinic 200 tablet 0     simvastatin (ZOCOR) 20 MG tablet TAKE ONE TABLET BY MOUTH ONCE DAILY AT BEDTIME 90 tablet 0     Allergies   Allergen Reactions     Darvocet [Propoxyphene N-Apap] Anaphylaxis     Darvocet,Percocet      Percocet [Oxycodone-Acetaminophen] Anaphylaxis, Hives and Swelling     Has tolerated hydromorphone in the past.      Asa [Aspirin] Hives     Aspirin causes seizures and hives, throat swelling.   Has tolerated ketorolac in the past.        Bee      Ibuprofen Swelling     Throat swelling per patient      Lyrica [Pregabalin] Other (See Comments) and Swelling     dizziness     Povidone Iodine Rash     Reaction to topical betadine     Tape [Adhesive Tape] Rash       ROS:  Constitutional, HEENT, cardiovascular, pulmonary, gi and gu systems are negative, except as otherwise noted.    OBJECTIVE:                                                    /60 (BP Location: Right arm, Patient Position: Sitting, Cuff Size: Adult Large)   Pulse 77   Temp 98.5  F (36.9  C) (Tympanic)   Resp 20   Ht 1.473 m (4' 10\")   Wt 109 kg (240 lb 4.8 oz)   LMP 09/27/2009   SpO2 94%   BMI 50.22 kg/m   Body mass index is 50.22 kg/m .   GENERAL: healthy, alert, well nourished, well hydrated, no " distress  HENT: ear canals- normal; TMs- normal; Nose- normal; Mouth- no ulcers, no lesions  NECK: no tenderness, no adenopathy, no asymmetry, no masses, no stiffness; thyroid- normal to palpation  RESP: lungs clear to auscultation - no rales, no rhonchi, no wheezes  CV: regular rates and rhythm, normal S1 S2, no S3 or S4 and no murmur, no click or rub -  ABDOMEN: soft, no tenderness, no  hepatosplenomegaly, no masses, normal bowel sounds  Leg  Less swelling than is demarkated.      ASSESSMENT/PLAN:                                                      (D69.6) Thrombocytopenia (H)  Good control.  Continue currant medications, continue to monito   Stable but complicates coumadin use.    (L03.115) Cellulitis of right lower extremity  Comment: improved but still not resolved  Will contiue keflex fr a full 10 days.  Plan: cephALEXin (KEFLEX) 500 MG capsule            (F33.1) Major depressive disorder, recurrent episode, moderate (H)  Comment: Good control.  Continue currant medications, continue to monito   Plan: traZODone (DESYREL) 150 MG tablet, lamoTRIgine         (LAMICTAL) 100 MG tablet            (G25.81) Restless leg syndrome  Comment: Good control.  Continue currant medications, continue to monito   Plan: pramipexole (MIRAPEX) 0.125 MG tablet        I warned about not incerasing dose.    (T63.359X) Anaphylactic reaction to bee sting, undetermined intent, subsequent encounter  Comment:   Plan: EPINEPHrine (EPIPEN/ADRENACLICK/OR ANY BX         GENERIC EQUIV) 0.3 MG/0.3ML injection 2-pack               reports that she has been smoking cigarettes.  She has a 17.00 pack-year smoking history. She has never used smokeless tobacco.  Tobacco Cessation Action Plan: Information offered: Patient not interested at this time she is contemplating quitting in the near future.    Weight management plan: Discussed healthy diet and exercise guidelines      [facility]

## 2019-06-13 ENCOUNTER — HOSPITAL ENCOUNTER (OUTPATIENT)
Dept: PHYSICAL THERAPY | Facility: CLINIC | Age: 54
Setting detail: THERAPIES SERIES
End: 2019-06-13
Attending: FAMILY MEDICINE
Payer: COMMERCIAL

## 2019-06-13 LAB
BACTERIA SPEC CULT: NO GROWTH
BACTERIA SPEC CULT: NO GROWTH
Lab: NORMAL
Lab: NORMAL
SPECIMEN SOURCE: NORMAL
SPECIMEN SOURCE: NORMAL

## 2019-06-13 PROCEDURE — 97110 THERAPEUTIC EXERCISES: CPT | Mod: GP

## 2019-06-13 RX ORDER — SIMVASTATIN 20 MG
TABLET ORAL
Qty: 90 TABLET | Refills: 0 | Status: SHIPPED | OUTPATIENT
Start: 2019-06-13 | End: 2019-09-10

## 2019-06-13 NOTE — TELEPHONE ENCOUNTER
Prescription approved per [facility] Refill Protocol.  Patient has upcoming appointment. Madeleine Mesa RN

## 2019-06-19 ENCOUNTER — OFFICE VISIT (OUTPATIENT)
Dept: FAMILY MEDICINE | Facility: CLINIC | Age: 54
End: 2019-06-19
Payer: COMMERCIAL

## 2019-06-19 VITALS
OXYGEN SATURATION: 96 % | BODY MASS INDEX: 49.43 KG/M2 | SYSTOLIC BLOOD PRESSURE: 126 MMHG | RESPIRATION RATE: 26 BRPM | WEIGHT: 235.5 LBS | HEART RATE: 77 BPM | TEMPERATURE: 97.2 F | DIASTOLIC BLOOD PRESSURE: 59 MMHG | HEIGHT: 58 IN

## 2019-06-19 DIAGNOSIS — R59.1 LYMPHADENOPATHY: ICD-10-CM

## 2019-06-19 DIAGNOSIS — L03.115 CELLULITIS OF RIGHT LOWER EXTREMITY: Primary | ICD-10-CM

## 2019-06-19 PROCEDURE — 99213 OFFICE O/P EST LOW 20 MIN: CPT | Performed by: FAMILY MEDICINE

## 2019-06-19 ASSESSMENT — PAIN SCALES - GENERAL: PAINLEVEL: SEVERE PAIN (7)

## 2019-06-19 ASSESSMENT — MIFFLIN-ST. JEOR: SCORE: 1567.97

## 2019-06-19 NOTE — LETTER
My Asthma Action Plan  Name: Bia Bill   YOB: 1965  Date: 6/19/2019   My doctor: Kd Leong MD   My clinic: [facility]        My Control Medicine:   My Rescue Medicine: Albuterol (Proair/Ventolin/Proventil) inhaler up to every 4 hours    My Asthma Severity: intermittent  Avoid your asthma triggers: smoke, upper respiratory infections, dust mites, pollens, animal dander and insects/rodents               GREEN ZONE   Good Control    I feel good    No cough or wheeze    Can work, sleep and play without asthma symptoms       Take your asthma control medicine every day.     1. If exercise triggers your asthma, take your rescue medication    15 minutes before exercise or sports, and    During exercise if you have asthma symptoms  2. Spacer to use with inhaler: If you have a spacer, make sure to use it with your inhaler             YELLOW ZONE Getting Worse  I have ANY of these:    I do not feel good    Cough or wheeze    Chest feels tight    Wake up at night   1. Keep taking your Green Zone medications  2. Start taking your rescue medicine:    every 20 minutes for up to 1 hour. Then every 4 hours for 24-48 hours.  3. If you stay in the Yellow Zone for more than 12-24 hours, contact your doctor.  4. If you do not return to the Green Zone in 12-24 hours or you get worse, start taking your oral steroid medicine if prescribed by your provider.           RED ZONE Medical Alert - Get Help  I have ANY of these:    I feel awful    Medicine is not helping    Breathing getting harder    Trouble walking or talking    Nose opens wide to breathe       1. Take your rescue medicine NOW  2. If your provider has prescribed an oral steroid medicine, start taking it NOW  3. Call your doctor NOW  4. If you are still in the Red Zone after 20 minutes and you have not reached your doctor:    Take your rescue medicine again and    Call 911 or go to the emergency room right away    See your regular doctor  within 2 weeks of an Emergency Room or Urgent Care visit for follow-up treatment.          Annual Reminders:  Meet with Asthma Educator,  Flu Shot in the Fall, consider Pneumonia Vaccination for patients with asthma (aged 19 and older).    Pharmacy:    [place], [place] - [address] [id number], [place] - [address]  [facility], [place] - [address] [place] [address]                      Asthma Triggers  How To Control Things That Make Your Asthma Worse    Triggers are things that make your asthma worse.  Look at the list below to help you find your triggers and what you can do about them.  You can help prevent asthma flare-ups by staying away from your triggers.      Trigger                                                          What you can do   Cigarette Smoke  Tobacco smoke can make asthma worse. Do not allow smoking in your home, car or around you.  Be sure no one smokes at a child s day care or school.  If you smoke, ask your health care provider for ways to help you quit.  Ask family members to quit too.  Ask your health care provider for a referral to Quit Plan to help you quit smoking, or call 0-113-920-PLAN.     Colds, Flu, Bronchitis  These are common triggers of asthma. Wash your hands often.  Don t touch your eyes, nose or mouth.  Get a flu shot every year.     Dust Mites  These are tiny bugs that live in cloth or carpet. They are too small to see. Wash sheets and blankets in hot water every week.   Encase pillows and mattress in dust mite proof covers.  Avoid having carpet if you can. If you have carpet, vacuum weekly.   Use a dust mask and HEPA vacuum.   Pollen and Outdoor Mold  Some people are allergic to trees, grass, or weed pollen, or molds. Try to keep your windows closed.  Limit time out doors when pollen count is high.   Ask you health care provider about taking medicine during allergy season.     Animal  Dander  Some people are allergic to skin flakes, urine or saliva from pets with fur or feathers. Keep pets with fur or feathers out of your home.    If you can t keep the pet outdoors, then keep the pet out of your bedroom.  Keep the bedroom door closed.  Keep pets off cloth furniture and away from stuffed toys.     Mice, Rats, and Cockroaches  Some people are allergic to the waste from these pests.   Cover food and garbage.  Clean up spills and food crumbs.  Store grease in the refrigerator.   Keep food out of the bedroom.   Indoor Mold  This can be a trigger if your home has high moisture. Fix leaking faucets, pipes, or other sources of water.   Clean moldy surfaces.  Dehumidify basement if it is damp and smelly.   Smoke, Strong Odors, and Sprays  These can reduce air quality. Stay away from strong odors and sprays, such as perfume, powder, hair spray, paints, smoke incense, paint, cleaning products, candles and new carpet.   Exercise or Sports  Some people with asthma have this trigger. Be active!  Ask your doctor about taking medicine before sports or exercise to prevent symptoms.    Warm up for 5-10 minutes before and after sports or exercise.     Other Triggers of Asthma  Cold air:  Cover your nose and mouth with a scarf.  Sometimes laughing or crying can be a trigger.  Some medicines and food can trigger asthma.

## 2019-06-19 NOTE — PROGRESS NOTES
SUBJECTIVE:                                                    Bia Bill is a 53 year old female who presents to clinic today for the following health issues:    Chief Complaint   Patient presents with     Follow Up     **Here to follow up on the Cellulitis of her right lower leg. She says the swelling is going down but it is still painful.     Problem list and histories reviewed & adjusted, as indicated.  Additional history:     Patient Active Problem List   Diagnosis     Mild persistent asthma     Polyneuropathy in other diseases classified elsewhere (H)     DVT (deep venous thrombosis) (H)     Alcohol abuse, in remission     SECONDARY POLYCYTHEMIA     Chondromalacia of patella     Developmental reading disorder     Esophageal reflux     Hypothyroidism     Fatty liver     GILES (Obstructive Sleep Apnea)-Moderate (AHI 16)     RLS (restless legs syndrome)     Smoker     Erythrocytosis     Moderate mixed bipolar I disorder (H)     Personality disorder, depressive     COPD (chronic obstructive pulmonary disease) (H)     Rosacea     Health Care Home     DDD (degenerative disc disease), cervical     Benign neoplasm of colon (POLYPOSIS)     Stroke (H)     Somatization disorder     Sebaceous cyst of right axilla     HTN, goal below 140/90     Left leg pain     Vitamin D deficiency     Morbid obesity (H)     PVD (peripheral vascular disease) with claudication (H)     Cyst of left ovary     Morbid obesity with alveolar hypoventilation (H)     Severe episode of recurrent major depressive disorder (H)     Conductive hearing loss of left ear with restricted hearing of right ear     Sensorineural hearing loss (SNHL) of right ear with restricted hearing of left ear     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     Cellulitis     Past Surgical History:   Procedure Laterality Date     BILIARY STENT SINGLE USE COV      3 stints in left leg     BONE MARROW BIOPSY, BONE SPECIMEN,  NEEDLE/TROCAR N/A 11/17/2014    Procedure: BIOPSY BONE MARROW;  Surgeon: Hay Aaron MD;  Location: [facility]     D & C  10/26/09    with uterine ablation     ESOPHAGOSCOPY, GASTROSCOPY, DUODENOSCOPY (EGD), COMBINED  4/21/2014    Procedure: Gastroscopy;  Surgeon: Moris Thomas MD;  Location: [facility]     HYSTERECTOMY, PAP NO LONGER INDICATED  1-4-2010     LAPAROSCOPIC CHOLECYSTECTOMY N/A 1/4/2019    Procedure: Laparoscopic cholecystectomy;  Surgeon: Aaron Siegel MD;  Location: [facility]     LITHOTRIPSY  2004    Lithotrypsy       Social History     Tobacco Use     Smoking status: Current Every Day Smoker     Packs/day: 0.50     Years: 34.00     Pack years: 17.00     Types: Cigarettes     Smokeless tobacco: Never Used     Tobacco comment: started at age 11.  Quit during pregnancyX4.  Quit 3 months ago.    Substance Use Topics     Alcohol use: No     Alcohol/week: 0.0 oz     Comment: quit 1995     Family History   Problem Relation Age of Onset     Hypertension Mother      Diabetes Mother      Blood Disease Mother      Heart Disease Mother         chf     Cerebrovascular Disease Mother      Hypertension Father      Cancer Father         lung cancer     Allergies Sister      Diabetes Sister      Depression Sister      Hypertension Sister          Current Outpatient Medications   Medication Sig Dispense Refill     acetaminophen (TYLENOL) 500 MG tablet Take 500-1,000 mg by mouth every 6 hours as needed for mild pain       albuterol (2.5 MG/3ML) 0.083% neb solution Take 1 vial (2.5 mg) by nebulization every 6 hours as needed for shortness of breath / dyspnea or wheezing 1 Box 0     albuterol (PROAIR HFA/PROVENTIL HFA/VENTOLIN HFA) 108 (90 BASE) MCG/ACT Inhaler Inhale 2 puffs into the lungs every 6 hours as needed for shortness of breath / dyspnea or wheezing 3 Inhaler 1     allopurinol (ZYLOPRIM) 300 MG tablet Take 1 tablet (300 mg) by mouth daily 90 tablet 3     ARIPiprazole (ABILIFY) 10 MG tablet TAKE ONE  "TABLET BY MOUTH EVERY DAY 90 tablet 1     buPROPion (WELLBUTRIN XL) 150 MG 24 hr tablet TAKE ONE TABLET BY MOUTH EVERY DAY IN THE MORNING. (Patient taking differently: TAKE ONE TABLET BY MOUTH EVERY DAY IN THE EVENING) 30 tablet 5     cephALEXin (KEFLEX) 500 MG capsule Take 1 capsule (500 mg) by mouth 3 times daily 21 capsule 0     EPINEPHrine (EPIPEN/ADRENACLICK/OR ANY BX GENERIC EQUIV) 0.3 MG/0.3ML injection 2-pack Inject 0.3 mLs (0.3 mg) into the muscle once as needed for anaphylaxis 0.6 mL 0     furosemide (LASIX) 20 MG tablet TAKE TWO TABLETS BY MOUTH TWICE DAILY 360 tablet 3     gabapentin (NEURONTIN) 300 MG capsule TAKE ONE CAPSULE BY MOUTH ONCE DAILY AT BEDTIME WITH A 600 MG TABLET FOR A TOTAL DOSE  MG 90 capsule 0     gabapentin (NEURONTIN) 600 MG tablet TAKE ONE TABLET BY MOUTH THREE TIMES A DAY 90 tablet 0     HYDROmorphone (DILAUDID) 2 MG tablet Take 0.5-1 tablets (1-2 mg) by mouth every 3 hours as needed for moderate to severe pain 10 tablet 0     ipratropium - albuterol 0.5 mg/2.5 mg/3 mL (DUONEB) 0.5-2.5 (3) MG/3ML neb solution Take 1 vial (3 mLs) by nebulization every 6 hours as needed for shortness of breath / dyspnea or wheezing 30 vial 1     lamoTRIgine (LAMICTAL) 100 MG tablet Take 1 tablet (100 mg) by mouth daily 90 tablet 3     levothyroxine (SYNTHROID/LEVOTHROID) 150 MCG tablet Take 1 tablet (150 mcg) by mouth daily 90 tablet 2     mometasone-formoterol (DULERA) 200-5 MCG/ACT oral inhaler Inhale 2 puffs into the lungs 2 times daily 13 g 1     omeprazole (PRILOSEC) 20 MG DR capsule TAKE ONE CAPSULE BY MOUTH TWICE A  capsule 1     order for DME Equipment being ordered:x2 [employer] 30/40mmHg compression wraps with x2 extra prs of compression liners 1 each 0     order for DME Equipment being ordered: Nebulizer 1 Device 0     order for DME Equipment being ordered: CPAP  AIRSENSE 10  5-18 CM H20  SN# [id number]   DN# [id number]       order for DME Equipment being ordered: [id number]\" x2pair " "    \"20-30mmHg [facility] Hybrid Liners\" x 2 pair 2 Units 11     order for DME Equipment being ordered: DEPENDS SIZE LARGE 60 Month 5     order for DME Equipment being ordered: INCONTINENCE PADS   QID 1 Month 11     potassium chloride SA (K-DUR/KLOR-CON M) 10 MEQ CR tablet TAKE ONE TABLET BY MOUTH ONE TIME DAILY 90 tablet 3     pramipexole (MIRAPEX) 0.125 MG tablet TAKE 1-2 TABLETS BY MOUTH AT BEDTIME 60 tablet 10     simvastatin (ZOCOR) 20 MG tablet TAKE ONE TABLET BY MOUTH ONCE DAILY AT BEDTIME 90 tablet 0     traZODone (DESYREL) 150 MG tablet Take 1 tablet (150 mg) by mouth At Bedtime 90 tablet 1     VITAMIN D3 1000 units tablet TAKE ONE TABLET BY MOUTH ONCE DAILY 100 tablet 0     warfarin (COUMADIN) 5 MG tablet Take 7.5mg every Mon, wed, Fri; 10 mg all other days or As directed by Anticoagulation clinic 200 tablet 0     warfarin (JANTOVEN) 5 MG tablet TAKE TWO AND ONE-HALF TABLETS BY MOUTH ON MONDAY AND FRIDAY AND TWO TABLETS ALL OTHER DAYS OR AS DIRECTED BY ANTICOAGULATION CLINIC 170 tablet 0     Allergies   Allergen Reactions     Darvocet [Propoxyphene N-Apap] Anaphylaxis     Darvocet,Percocet      Percocet [Oxycodone-Acetaminophen] Anaphylaxis, Hives and Swelling     Has tolerated hydromorphone in the past.      Asa [Aspirin] Hives     Aspirin causes seizures and hives, throat swelling.   Has tolerated ketorolac in the past.        Bee      Ibuprofen Swelling     Throat swelling per patient      Lyrica [Pregabalin] Other (See Comments) and Swelling     dizziness     Povidone Iodine Rash     Reaction to topical betadine     Tape [Adhesive Tape] Rash       ROS:  Constitutional, HEENT, cardiovascular, pulmonary, gi and gu systems are negative, except as otherwise noted.    OBJECTIVE:                                                    /59 (BP Location: Right arm, Patient Position: Sitting, Cuff Size: Adult Large)   Pulse 77   Temp 97.2  F (36.2  C) (Tympanic)   Resp 26   Ht 1.473 m (4' 10\")   Wt 106.8 kg " (235 lb 8 oz)   LMP 09/27/2009   SpO2 96%   BMI 49.22 kg/m   Body mass index is 49.22 kg/m .   GENERAL: healthy, alert, well nourished, well hydrated, no distress  HENT: ear canals- normal; TMs- normal; Nose- normal; Mouth- no ulcers, no lesions  NECK: no tenderness, no adenopathy, no asymmetry, no masses, no stiffness; thyroid- normal to palpation  RESP: lungs clear to auscultation - no rales, no rhonchi, no wheezes  CV: regular rates and rhythm, normal S1 S2, no S3 or S4 and no murmur, no click or rub -  ABDOMEN: soft, no tenderness, no  hepatosplenomegaly, no masses, normal bowel sounds  Skin:  Erythema is much less areaa than demarkated on her leg.      ASSESSMENT/PLAN:                                                      (L03.115) Cellulitis of right lower extremity  (primary encounter diagnosis)   compression,  Heat  Finish out course of antibiotics.     (R59.1) Lymphadenopathy  Comment: restart lymphedema clinic.             reports that she has been smoking cigarettes.  She has a 17.00 pack-year smoking history. She has never used smokeless tobacco.  Tobacco Cessation Action Plan: Information offered: Patient not interested at this time    Weight management plan: Discussed healthy diet and exercise guidelines      [facility]

## 2019-06-20 ENCOUNTER — OFFICE VISIT (OUTPATIENT)
Dept: PSYCHOLOGY | Facility: CLINIC | Age: 54
End: 2019-06-20
Payer: COMMERCIAL

## 2019-06-20 ENCOUNTER — TELEPHONE (OUTPATIENT)
Dept: ANTICOAGULATION | Facility: CLINIC | Age: 54
End: 2019-06-20

## 2019-06-20 DIAGNOSIS — I82.4Y9 DEEP VEIN THROMBOSIS (DVT) OF PROXIMAL LOWER EXTREMITY, UNSPECIFIED CHRONICITY, UNSPECIFIED LATERALITY (H): Chronic | ICD-10-CM

## 2019-06-20 DIAGNOSIS — F31.62 MODERATE MIXED BIPOLAR I DISORDER (H): Primary | ICD-10-CM

## 2019-06-20 DIAGNOSIS — Z86.73 HISTORY OF STROKE: ICD-10-CM

## 2019-06-20 LAB
BASOPHILS # BLD AUTO: 0 10E9/L (ref 0–0.2)
BASOPHILS NFR BLD AUTO: 0.1 %
DIFFERENTIAL METHOD BLD: ABNORMAL
EOSINOPHIL # BLD AUTO: 0 10E9/L (ref 0–0.7)
EOSINOPHIL NFR BLD AUTO: 0.5 %
ERYTHROCYTE [DISTWIDTH] IN BLOOD BY AUTOMATED COUNT: 21.9 % (ref 10–15)
HCT VFR BLD AUTO: 61.1 % (ref 35–47)
HGB BLD-MCNC: 19.3 G/DL (ref 11.7–15.7)
INR PPP: NORMAL (ref 0.86–1.14)
LYMPHOCYTES # BLD AUTO: 1.2 10E9/L (ref 0.8–5.3)
LYMPHOCYTES NFR BLD AUTO: 16.4 %
MCH RBC QN AUTO: 30.2 PG (ref 26.5–33)
MCHC RBC AUTO-ENTMCNC: 31.6 G/DL (ref 31.5–36.5)
MCV RBC AUTO: 96 FL (ref 78–100)
MONOCYTES # BLD AUTO: 0.4 10E9/L (ref 0–1.3)
MONOCYTES NFR BLD AUTO: 5 %
NEUTROPHILS # BLD AUTO: 5.9 10E9/L (ref 1.6–8.3)
NEUTROPHILS NFR BLD AUTO: 78 %
PLATELET # BLD AUTO: 100 10E9/L (ref 150–450)
RBC # BLD AUTO: 6.39 10E12/L (ref 3.8–5.2)
WBC # BLD AUTO: 7.6 10E9/L (ref 4–11)

## 2019-06-20 PROCEDURE — 36415 COLL VENOUS BLD VENIPUNCTURE: CPT | Performed by: INTERNAL MEDICINE

## 2019-06-20 PROCEDURE — 90834 PSYTX W PT 45 MINUTES: CPT | Performed by: PSYCHOLOGIST

## 2019-06-20 PROCEDURE — 85025 COMPLETE CBC W/AUTO DIFF WBC: CPT | Performed by: INTERNAL MEDICINE

## 2019-06-20 ASSESSMENT — PATIENT HEALTH QUESTIONNAIRE - PHQ9: SUM OF ALL RESPONSES TO PHQ QUESTIONS 1-9: 6

## 2019-06-20 ASSESSMENT — ANXIETY QUESTIONNAIRES
2. NOT BEING ABLE TO STOP OR CONTROL WORRYING: NOT AT ALL
3. WORRYING TOO MUCH ABOUT DIFFERENT THINGS: NOT AT ALL
7. FEELING AFRAID AS IF SOMETHING AWFUL MIGHT HAPPEN: SEVERAL DAYS
5. BEING SO RESTLESS THAT IT IS HARD TO SIT STILL: MORE THAN HALF THE DAYS
1. FEELING NERVOUS, ANXIOUS, OR ON EDGE: SEVERAL DAYS
4. TROUBLE RELAXING: NOT AT ALL
6. BECOMING EASILY ANNOYED OR IRRITABLE: SEVERAL DAYS
GAD7 TOTAL SCORE: 5

## 2019-06-20 NOTE — TELEPHONE ENCOUNTER
Writer called and left detailed message on mobile number that INR was not able to be completed today at lab (unsatisfactory specimen). Writer asked patient to check tomorrow, if able, since she was recently hospitalized for cellulitis and on abx.     Mireille Torres RN, BSN, PHN

## 2019-06-20 NOTE — PROGRESS NOTES
Progress Note  Disclaimer: This note consists of symbols derived from keyboarding, dictation and/or voice recognition software. As a result, there may be errors in the script that have gone undetected. Please consider this when interpreting information found in this chart.    Client Name: Bia Bill  Date: 6/20/2019         Service Type: Individual      Session Start Time: 8:30 AM   session End Time 9:15 AM    session Length: 45     Session #: 50     Attendees: Client attended alone    Treatment Plan Last Reviewed: 4/3/2019  PHQ-9 / CHIN-7 : See flowsheet     DATA  Client reports she had a severe infection resulting from an insect bite that landed her in the hospital requiring IV antibiotics.  She is still a little shaky on her feet.  As a result of hospitalization she has been able to cut her smoking down to 3 cigarettes a day.  She is also lost 6 pounds by staying with smaller portions since she got out of the hospital.  She also had a decent visit with her sister over the weekend.  Congratulated client on making a number of healthy decisions given her recent situation she is also doing quite well and maintaining boundaries with family and neighbors.   Progress Since Last Session (Related to Symptoms / Goals / Homework):   Symptoms: Stable    Homework: Achieved / completed to satisfaction      Episode of Care Goals: Satisfactory progress - ACTION (Actively working towards change); Intervened by reinforcing change plan / affirming steps taken     Current / Ongoing Stressors and Concerns:   Recurrent depression  family relational problems, medical problems, chronic pain, trauma history      Treatment Objective(s) Addressed in This Session:   Increase interest, engagement, and pleasure in doing things  Decrease frequency and intensity of feeling down, depressed, hopeless       Intervention:   CBT: Behavioral activation supported client's effort at establishing  maintaining healthy boundaries with her family.  Reinforced client's care for her grandchildren     ASSESSMENT: Current Emotional / Mental Status (status of significant symptoms):   Risk status (Self / Other harm or suicidal ideation)   Client denies current fears or concerns for personal safety.   Client denies current or recent suicidal ideation or behaviors.   Client denies current or recent homicidal ideation or behaviors.   Client denies current or recent self injurious behavior or ideation.   Client denies other safety concerns.   A safety and risk management plan has not been developed at this time, however client was given the after-hours number / 911 should there be a change in any of these risk factors.     Appearance:   Appropriate    Eye Contact:   Good    Psychomotor Behavior: Normal    Attitude:   Cooperative    Orientation:   All   Speech    Rate / Production: Normal     Volume:  Normal    Mood:    Normal   Affect:    Appropriate    Thought Content:  Clear    Thought Form:  Coherent  Logical    Insight:    Good      Medication Review:   No changes to current psychiatric medication(s)     Medication Compliance:   Yes     Changes in Health Issues:   None reported     Chemical Use Review:   Substance Use: Chemical use reviewed, no active concerns identified      Tobacco Use: Client reports she is down to 1 cigarette per day from 2 packs/day.   Collateral Reports Completed:   Not Applicable    PLAN: (Client Tasks / Therapist Tasks / Other)  Client to continue With her self-care routine and maintenance of boundaries.    Practice one to 2 grounding techniques each day.   Client to maintain sobriety and contact with supportive sober others.  Kd Morris                                                         ________________________________________________________________________    Treatment Plan    Client's Name: Bia Bill  YOB: 1965    Date: 10/24/2017      DSM5 Diagnoses:  (Sustained by DSM5 Criteria Listed Above)  Diagnoses: 296.40 Bipolar I Disorder Current or Most Recent Episode Manic, Unspecified  Psychosocial & Contextual Factors: financial difficulties, chronic pain, roommate issues  WHODAS 2.0 (12 item)               This questionnaire asks about difficulties due to health conditions. Health conditions             include disease or illnesses, other health problems that may be short or long lasting,             injuries, mental health or emotional problems, and problems with alcohol or drugs.                         Think back over the past 30 days and answer these questions, thinking about how much             difficulty you had doing the following activities. For each question, please [place] only             one response.      S1  Standing for long periods such as 30 minutes?  Mild =           2    S2  Taking care of household responsibilities?  Moderate =   3    S3  Learning a new task, for example, learning how to get to a new place?  Mild =           2    S4  How much of a problem do you have joining community activities (for example, festivals, [employer] or other activities) in the same way as anyone else can?  Moderate =   3    S5  How much have you been emotionally affected by your health problems?  Mild =           2        In the past 30 days, how much difficulty did you have in:    S6  Concentrating on doing something for ten minutes?  Mild =           2    S7  Walking a long distance such as a kilometer (or equivalent)?  Severe =       4    S8  Washing your whole body?  None =         1    S9  Getting dressed?  None =         1    S10  Dealing with people you do not know?  Moderate =   3    S11  Maintaining a friendship?  Severe =       4    S12  Your day to day work?  Moderate =   3       H1  Overall, in the past 30 days, how many days were these difficulties present?  Record number of days seven    H2  In the past 30 days, for how many days were you totally unable  to carry out your usual activities or work because of any health condition?  Record number of days  seven    H3  In the past 30 days, not counting the days that you were totally unable, for how many days did you cut back or reduce your usual activities or work because of any health condition?  Record number of days five                   Referral / Collaboration:  Referral to another professional/service is not indicated at this time..    Anticipated number of session or this episode of care: 15    Goals  1. Education- the Biopsychosocial model of depression  a. Client will be able to describe how depression is effecting them physically, emotionally and socially  2. Behavioral Activation  a. Client will learn to assess their depression on a day to day basis  b. Client will Identify two forms of exercise/activity and engage in them 3 times per week  c. Client will Identify 3 things that make them laugh, and engage in these 5 times per week.  d. Client will Identify 1-2Creative activities or hobbies  and engage in them 2 times per week  e. Client will identify music, movies, books that make them feel good and use them 3-4 times per week  3. Self-care  a. Client will identify 5 things they can do just for themselves  b. Client will take time for quiet, reflection, meditation 5 times per week  c. Client will Learn to set boundaries when appropriate  d. Client will Identify 2 individuals they can call on for support, distraction  4. Assessment of progress  a. Client will engage in assessment of their progress on a regular basis    Bipolar Disorder  Treatment plan:  1. Education- the Biopsychosocial model of Bipolar Disorder    a. Client will be able to describe in general terms what Bipolar Disorder  is and is not  b. Client will be able to describe how Bipolar Disorder  has affected their life in at least two different areas, such as school or work and Home/relationships  c. Clients parents/guardians or significant  others will be provided information on what Bipolar Disorder  is and the ways it can affect relationships and be encouraged to be a part of clients treatment team.  2. Medication  a. Client will participate in medication evaluation for Bipolar Disorder  symptoms and follow medication recommendations.   3. Identification and management of triggers  a. Client and therapist will examine patient s history to determine if there are predictable triggers for manic or depressive episodes (e.g. boredom, anger, family stress)  b. Client and therapist will develop means of diffusing these triggers (e.g. relaxation strategies, boundary setting, anger management)  4. Comorbid conditions   a. Client and therapist will asses for comorbid conditions (e.g. anxiety, depression, substance use). and add additional items to treatment plan as needed  5. Self-care  a. Client will identify 3 things they can do just for themselves  b. Client will take time for quiet, reflection, meditation 3 times per week  c. Client will Learn to set boundaries when appropriate  d. Client will Identify 2 individuals they can call on for support, distraction  5. Behavioral Activation  a. Client will Identify two forms of exercise/activity and engage in them 3 times per week  b. Client will Identify 2 things that make them laugh, and engage in these 3 times per week.  c. Client will Identify 2 Creative activities or hobbies  and engage in them 2 times per week  d. Client will identify music, movies, books that make them feel good and use them 3 times per week  6. Assessment of progress  a. Client will engage in assessment of their progress on a regular basis    Client has reviewed and agreed to the above plan.      Kd Morris  4/3/2018

## 2019-06-21 ENCOUNTER — ANTICOAGULATION THERAPY VISIT (OUTPATIENT)
Dept: ANTICOAGULATION | Facility: CLINIC | Age: 54
End: 2019-06-21

## 2019-06-21 DIAGNOSIS — I82.4Y9 DEEP VEIN THROMBOSIS (DVT) OF PROXIMAL LOWER EXTREMITY, UNSPECIFIED CHRONICITY, UNSPECIFIED LATERALITY (H): ICD-10-CM

## 2019-06-21 DIAGNOSIS — I82.409 DVT (DEEP VENOUS THROMBOSIS) (H): ICD-10-CM

## 2019-06-21 DIAGNOSIS — I73.9 PVD (PERIPHERAL VASCULAR DISEASE) WITH CLAUDICATION (H): ICD-10-CM

## 2019-06-21 DIAGNOSIS — Z79.01 LONG TERM CURRENT USE OF ANTICOAGULANT THERAPY: ICD-10-CM

## 2019-06-21 DIAGNOSIS — I63.9 STROKE (H): ICD-10-CM

## 2019-06-21 DIAGNOSIS — Z86.73 HISTORY OF STROKE: ICD-10-CM

## 2019-06-21 LAB — INR PPP: 2.42 (ref 0.86–1.14)

## 2019-06-21 PROCEDURE — 36415 COLL VENOUS BLD VENIPUNCTURE: CPT | Performed by: FAMILY MEDICINE

## 2019-06-21 PROCEDURE — 99207 ZZC NO CHARGE NURSE ONLY: CPT

## 2019-06-21 PROCEDURE — 85610 PROTHROMBIN TIME: CPT | Performed by: FAMILY MEDICINE

## 2019-06-21 ASSESSMENT — ANXIETY QUESTIONNAIRES: GAD7 TOTAL SCORE: 5

## 2019-06-21 NOTE — PROGRESS NOTES
ANTICOAGULATION FOLLOW-UP CLINIC VISIT    Patient Name:  Bia Bill  Date:  2019  Contact Type:  Telephone/ Left DVM    SUBJECTIVE:  Patient Findings     Comments:   Writer left DVM.  No changes in medications, activity, health, or diet noted. No bleeding or increased bruising noted. Took warfarin as prescribed.  Patient will continue on 62.5 mg weekly.   Recheck the INR in 4 weeks.         Clinical Outcomes     Negatives:   Major bleeding event, Thromboembolic event, Anticoagulation-related hospital admission, Anticoagulation-related ED visit, Anticoagulation-related fatality    Comments:   Writer left DVM.  No changes in medications, activity, health, or diet noted. No bleeding or increased bruising noted. Took warfarin as prescribed.  Patient will continue on 62.5 mg weekly.   Recheck the INR in 4 weeks.            OBJECTIVE    INR   Date Value Ref Range Status   2019 2.42 (H) 0.86 - 1.14 Final     Comment:     Special tube used to correct for high hematocrit       ASSESSMENT / PLAN  INR assessment THER    Recheck INR In: 4 WEEKS    INR Location Outside lab      Anticoagulation Summary  As of 2019    INR goal:   2.0-2.5   TTR:   46.5 % (4.2 y)   INR used for dosin.42 (2019)   Warfarin maintenance plan:   7.5 mg (5 mg x 1.5) every Mon, Wed, Fri; 10 mg (5 mg x 2) all other days   Full warfarin instructions:   7.5 mg every Mon, Wed, Fri; 10 mg all other days   Weekly warfarin total:   62.5 mg   Plan last modified:   Reina Forrest RN (2019)   Next INR check:   2019   Priority:   INR   Target end date:   Indefinite    Indications    PVD (peripheral vascular disease) with claudication (H) [I73.9]  Long term current use of anticoagulant therapy (Resolved) [Z79.01]  DVT (deep venous thrombosis) (H) [I82.409]  Stroke (H) [I63.9]             Anticoagulation Episode Summary     INR check location:       Preferred lab:       Send INR reminders to:   [facility]     Comments:   * lab draw due to elevated hematocrit (fingerstick meters don't work). LEFT LE. STENT x 3 LEFT UPPER LEG. Previous arterial clot. New goal range 2-2.5 starting 11/6/17.      Anticoagulation Care Providers     Provider Role Specialty Phone number    Kd Leong MD [facility] [phone]            See the Encounter Report to view Anticoagulation Flowsheet and Dosing Calendar (Go to Encounters tab in chart review, and find the Anticoagulation Therapy Visit)        Reina Forrest RN

## 2019-06-27 ENCOUNTER — HOSPITAL ENCOUNTER (OUTPATIENT)
Dept: PHYSICAL THERAPY | Facility: CLINIC | Age: 54
Setting detail: THERAPIES SERIES
End: 2019-06-27
Attending: FAMILY MEDICINE
Payer: COMMERCIAL

## 2019-06-27 PROCEDURE — 97140 MANUAL THERAPY 1/> REGIONS: CPT | Mod: GP

## 2019-07-01 ENCOUNTER — ANTICOAGULATION THERAPY VISIT (OUTPATIENT)
Dept: ANTICOAGULATION | Facility: CLINIC | Age: 54
End: 2019-07-01

## 2019-07-01 ENCOUNTER — OFFICE VISIT (OUTPATIENT)
Dept: PSYCHOLOGY | Facility: CLINIC | Age: 54
End: 2019-07-01
Payer: COMMERCIAL

## 2019-07-01 DIAGNOSIS — I82.409 DVT (DEEP VENOUS THROMBOSIS) (H): ICD-10-CM

## 2019-07-01 DIAGNOSIS — Z79.01 LONG TERM CURRENT USE OF ANTICOAGULANT THERAPY: ICD-10-CM

## 2019-07-01 DIAGNOSIS — I82.4Y9 DEEP VEIN THROMBOSIS (DVT) OF PROXIMAL LOWER EXTREMITY, UNSPECIFIED CHRONICITY, UNSPECIFIED LATERALITY (H): ICD-10-CM

## 2019-07-01 DIAGNOSIS — Z86.73 HISTORY OF STROKE: ICD-10-CM

## 2019-07-01 DIAGNOSIS — I63.9 STROKE (H): ICD-10-CM

## 2019-07-01 DIAGNOSIS — F31.62 MODERATE MIXED BIPOLAR I DISORDER (H): Primary | ICD-10-CM

## 2019-07-01 DIAGNOSIS — I73.9 PVD (PERIPHERAL VASCULAR DISEASE) WITH CLAUDICATION (H): ICD-10-CM

## 2019-07-01 LAB
HCT VFR BLD AUTO: 60.5 % (ref 35–47)
INR PPP: 2.09 (ref 0.86–1.14)

## 2019-07-01 PROCEDURE — 85014 HEMATOCRIT: CPT | Performed by: FAMILY MEDICINE

## 2019-07-01 PROCEDURE — 36415 COLL VENOUS BLD VENIPUNCTURE: CPT | Performed by: FAMILY MEDICINE

## 2019-07-01 PROCEDURE — 90834 PSYTX W PT 45 MINUTES: CPT | Performed by: PSYCHOLOGIST

## 2019-07-01 PROCEDURE — 85610 PROTHROMBIN TIME: CPT | Performed by: FAMILY MEDICINE

## 2019-07-01 PROCEDURE — 99207 ZZC NO CHARGE NURSE ONLY: CPT

## 2019-07-01 RX ORDER — WARFARIN SODIUM 5 MG/1
TABLET ORAL
Qty: 170 TABLET | Refills: 0 | COMMUNITY
Start: 2019-07-01 | End: 2019-07-19

## 2019-07-01 NOTE — PROGRESS NOTES
ANTICOAGULATION FOLLOW-UP CLINIC VISIT    Patient Name:  Bia Bill  Date:  2019  Contact Type:  Telephone    SUBJECTIVE:  Patient Findings     Comments:   No changes in medications, diet, or activity noted. Took warfarin as instructed, denies any missed doses. No issues with bleeding or unusual bruising noted.     Will recheck the INR in 2.5 weeks when she is back for another appointment.         Clinical Outcomes     Negatives:   Major bleeding event, Thromboembolic event, Anticoagulation-related hospital admission, Anticoagulation-related ED visit, Anticoagulation-related fatality    Comments:   No changes in medications, diet, or activity noted. Took warfarin as instructed, denies any missed doses. No issues with bleeding or unusual bruising noted.     Will recheck the INR in 2.5 weeks when she is back for another appointment.            OBJECTIVE    INR   Date Value Ref Range Status   2019 2.09 (H) 0.86 - 1.14 Final     Comment:     Special tube used to correct for high hematocrit       ASSESSMENT / PLAN  No question data found.  Anticoagulation Summary  As of 2019    INR goal:   2.0-2.5   TTR:   46.9 % (4.2 y)   INR used for dosin.09 (2019)   Warfarin maintenance plan:   7.5 mg (5 mg x 1.5) every Mon, Wed, Fri; 10 mg (5 mg x 2) all other days   Full warfarin instructions:   7.5 mg every Mon, Wed, Fri; 10 mg all other days   Weekly warfarin total:   62.5 mg   Plan last modified:   Reina Forrest RN (2019)   Next INR check:   2019   Priority:   INR   Target end date:   Indefinite    Indications    PVD (peripheral vascular disease) with claudication (H) [I73.9]  Long term current use of anticoagulant therapy (Resolved) [Z79.01]  DVT (deep venous thrombosis) (H) [I82.409]  Stroke (H) [I63.9]             Anticoagulation Episode Summary     INR check location:       Preferred lab:       Send INR reminders to:   [facility]    Comments:   * lab draw due to  elevated hematocrit (fingerstick meters don't work). LEFT LE. STENT x 3 LEFT UPPER LEG. Previous arterial clot. New goal range 2-2.5 starting 11/6/17.      Anticoagulation Care Providers     Provider Role Specialty Phone number    Kd Leong MD [facility] [phone]            See the Encounter Report to view Anticoagulation Flowsheet and Dosing Calendar (Go to Encounters tab in chart review, and find the Anticoagulation Therapy Visit)        [facility] Nj RN [facility]

## 2019-07-02 ASSESSMENT — PATIENT HEALTH QUESTIONNAIRE - PHQ9: SUM OF ALL RESPONSES TO PHQ QUESTIONS 1-9: 13

## 2019-07-02 ASSESSMENT — ANXIETY QUESTIONNAIRES
3. WORRYING TOO MUCH ABOUT DIFFERENT THINGS: SEVERAL DAYS
1. FEELING NERVOUS, ANXIOUS, OR ON EDGE: MORE THAN HALF THE DAYS
7. FEELING AFRAID AS IF SOMETHING AWFUL MIGHT HAPPEN: NOT AT ALL
GAD7 TOTAL SCORE: 10
6. BECOMING EASILY ANNOYED OR IRRITABLE: NEARLY EVERY DAY
4. TROUBLE RELAXING: MORE THAN HALF THE DAYS
5. BEING SO RESTLESS THAT IT IS HARD TO SIT STILL: SEVERAL DAYS
2. NOT BEING ABLE TO STOP OR CONTROL WORRYING: SEVERAL DAYS

## 2019-07-02 NOTE — PROGRESS NOTES
Progress Note  Disclaimer: This note consists of symbols derived from keyboarding, dictation and/or voice recognition software. As a result, there may be errors in the script that have gone undetected. Please consider this when interpreting information found in this chart.    Client Name: Bia Bill  Date: 7/1/2019         Service Type: Individual      Session Start Time: 10:30 AM   session End Time11:15 AM    session Length: 45     Session #: A 51     Attendees: Client attended alone    Treatment Plan Last Reviewed: 4/3/2019  PHQ-9 / CHIN-7 : See flowsheet     DATA  Client reports she discovered that her most recent fall tilted her pelvis.  Surgery has been recommended but client is going to try physical therapy first as she is having increasing difficulty recovering from surgery.  She is maintaining effective boundaries with her alcoholic family members.   Progress Since Last Session (Related to Symptoms / Goals / Homework):   Symptoms: Stable    Homework: Achieved / completed to satisfaction      Episode of Care Goals: Satisfactory progress - ACTION (Actively working towards change); Intervened by reinforcing change plan / affirming steps taken     Current / Ongoing Stressors and Concerns:   Recurrent depression  family relational problems, medical problems, chronic pain, trauma history      Treatment Objective(s) Addressed in This Session:   Increase interest, engagement, and pleasure in doing things  Decrease frequency and intensity of feeling down, depressed, hopeless       Intervention:   CBT: Behavioral activation supported client's effort at establishing maintaining healthy boundaries with her family.  Reinforced client's care for her grandchildren     ASSESSMENT: Current Emotional / Mental Status (status of significant symptoms):   Risk status (Self / Other harm or suicidal ideation)   Client denies current fears or concerns for personal safety.   Client  denies current or recent suicidal ideation or behaviors.   Client denies current or recent homicidal ideation or behaviors.   Client denies current or recent self injurious behavior or ideation.   Client denies other safety concerns.   A safety and risk management plan has not been developed at this time, however client was given the after-hours number / 911 should there be a change in any of these risk factors.     Appearance:   Appropriate    Eye Contact:   Good    Psychomotor Behavior: Normal    Attitude:   Cooperative    Orientation:   All   Speech    Rate / Production: Normal     Volume:  Normal    Mood:    Normal   Affect:    Appropriate    Thought Content:  Clear    Thought Form:  Coherent  Logical    Insight:    Good      Medication Review:   No changes to current psychiatric medication(s)     Medication Compliance:   Yes     Changes in Health Issues:   None reported     Chemical Use Review:   Substance Use: Chemical use reviewed, no active concerns identified      Tobacco Use: Client reports she is down to 1 cigarette per day from 2 packs/day.   Collateral Reports Completed:   Not Applicable    PLAN: (Client Tasks / Therapist Tasks / Other)  Client to continue With her self-care routine and maintenance of boundaries.    Practice one to 2 grounding techniques each day.   Client to maintain sobriety and contact with supportive sober others.  Kd Morris                                                         ________________________________________________________________________    Treatment Plan    Client's Name: Bia Bill  YOB: 1965    Date: 10/24/2017      DSM5 Diagnoses: (Sustained by DSM5 Criteria Listed Above)  Diagnoses: 296.40 Bipolar I Disorder Current or Most Recent Episode Manic, Unspecified  Psychosocial & Contextual Factors: financial difficulties, chronic pain, roommate issues  WHODAS 2.0 (12 item)               This questionnaire asks about difficulties due to health  conditions. Health conditions             include disease or illnesses, other health problems that may be short or long lasting,             injuries, mental health or emotional problems, and problems with alcohol or drugs.                         Think back over the past 30 days and answer these questions, thinking about how much             difficulty you had doing the following activities. For each question, please [place] only             one response.      S1  Standing for long periods such as 30 minutes?  Mild =           2    S2  Taking care of household responsibilities?  Moderate =   3    S3  Learning a new task, for example, learning how to get to a new place?  Mild =           2    S4  How much of a problem do you have joining community activities (for example, festivals, Christianity or other activities) in the same way as anyone else can?  Moderate =   3    S5  How much have you been emotionally affected by your health problems?  Mild =           2        In the past 30 days, how much difficulty did you have in:    S6  Concentrating on doing something for ten minutes?  Mild =           2    S7  Walking a long distance such as a kilometer (or equivalent)?  Severe =       4    S8  Washing your whole body?  None =         1    S9  Getting dressed?  None =         1    S10  Dealing with people you do not know?  Moderate =   3    S11  Maintaining a friendship?  Severe =       4    S12  Your day to day work?  Moderate =   3       H1  Overall, in the past 30 days, how many days were these difficulties present?  Record number of days seven    H2  In the past 30 days, for how many days were you totally unable to carry out your usual activities or work because of any health condition?  Record number of days  seven    H3  In the past 30 days, not counting the days that you were totally unable, for how many days did you cut back or reduce your usual activities or work because of any health condition?  Record number of  days five                   Referral / Collaboration:  Referral to another professional/service is not indicated at this time..    Anticipated number of session or this episode of care: 15    Goals  1. Education- the Biopsychosocial model of depression  a. Client will be able to describe how depression is effecting them physically, emotionally and socially  2. Behavioral Activation  a. Client will learn to assess their depression on a day to day basis  b. Client will Identify two forms of exercise/activity and engage in them 3 times per week  c. Client will Identify 3 things that make them laugh, and engage in these 5 times per week.  d. Client will Identify 1-2Creative activities or hobbies  and engage in them 2 times per week  e. Client will identify music, movies, books that make them feel good and use them 3-4 times per week  3. Self-care  a. Client will identify 5 things they can do just for themselves  b. Client will take time for quiet, reflection, meditation 5 times per week  c. Client will Learn to set boundaries when appropriate  d. Client will Identify 2 individuals they can call on for support, distraction  4. Assessment of progress  a. Client will engage in assessment of their progress on a regular basis    Bipolar Disorder  Treatment plan:  1. Education- the Biopsychosocial model of Bipolar Disorder    a. Client will be able to describe in general terms what Bipolar Disorder  is and is not  b. Client will be able to describe how Bipolar Disorder  has affected their life in at least two different areas, such as school or work and Home/relationships  c. Clients parents/guardians or significant others will be provided information on what Bipolar Disorder  is and the ways it can affect relationships and be encouraged to be a part of clients treatment team.  2. Medication  a. Client will participate in medication evaluation for Bipolar Disorder  symptoms and follow medication recommendations.    3. Identification and management of triggers  a. Client and therapist will examine patient s history to determine if there are predictable triggers for manic or depressive episodes (e.g. boredom, anger, family stress)  b. Client and therapist will develop means of diffusing these triggers (e.g. relaxation strategies, boundary setting, anger management)  4. Comorbid conditions   a. Client and therapist will asses for comorbid conditions (e.g. anxiety, depression, substance use). and add additional items to treatment plan as needed  5. Self-care  a. Client will identify 3 things they can do just for themselves  b. Client will take time for quiet, reflection, meditation 3 times per week  c. Client will Learn to set boundaries when appropriate  d. Client will Identify 2 individuals they can call on for support, distraction  5. Behavioral Activation  a. Client will Identify two forms of exercise/activity and engage in them 3 times per week  b. Client will Identify 2 things that make them laugh, and engage in these 3 times per week.  c. Client will Identify 2 Creative activities or hobbies  and engage in them 2 times per week  d. Client will identify music, movies, books that make them feel good and use them 3 times per week  6. Assessment of progress  a. Client will engage in assessment of their progress on a regular basis    Client has reviewed and agreed to the above plan.      Kd Morris  4/3/2018

## 2019-07-03 ASSESSMENT — ANXIETY QUESTIONNAIRES: GAD7 TOTAL SCORE: 10

## 2019-07-11 ENCOUNTER — HOSPITAL ENCOUNTER (OUTPATIENT)
Dept: PHYSICAL THERAPY | Facility: CLINIC | Age: 54
Setting detail: THERAPIES SERIES
End: 2019-07-11
Attending: FAMILY MEDICINE
Payer: COMMERCIAL

## 2019-07-11 PROCEDURE — 97110 THERAPEUTIC EXERCISES: CPT | Mod: GP

## 2019-07-11 PROCEDURE — 97140 MANUAL THERAPY 1/> REGIONS: CPT | Mod: GP

## 2019-07-12 DIAGNOSIS — F31.62 MODERATE MIXED BIPOLAR I DISORDER (H): Chronic | ICD-10-CM

## 2019-07-12 NOTE — TELEPHONE ENCOUNTER
"Requested Prescriptions   Pending Prescriptions Disp Refills     buPROPion (WELLBUTRIN XL) 150 MG 24 hr tablet [Pharmacy Med Name: BUPROPION HCL ER (XL) 150MG TB24]  Last Written Prescription Date:  1/8/19  Last Fill Quantity: 30,  # refills: 5   Last office visit: 6/19/2019 with prescribing provider:  [facility]   Future Office Visit:   Next 5 appointments (look out 90 days)    Jul 19, 2019  1:00 PM CDT  Return Visit with [facility] [address] [address]  [phone]   Aug 02, 2019  1:00 PM CDT  Return Visit with [facility] [address] [address]  [phone]   Sep 27, 2019 10:40 AM CDT  Return Visit with Joshua Watts MD  [facility] ([facility]  [address]  [facility] [place] [address]  [phone]          150 tablet 0     Sig: TAKE ONE TABLET BY MOUTH ONCE DAILY IN THE MORNING       SSRIs Protocol Passed - 7/12/2019  4:25 PM        Passed - Recent (12 mo) or future (30 days) visit within the authorizing provider's specialty     Patient had office visit in the last 12 months or has a visit in the next 30 days with authorizing provider or within the authorizing provider's specialty.  See \"Patient Info\" tab in inbasket, or \"Choose Columns\" in Meds & Orders section of the refill encounter.              Passed - Medication is Bupropion     If the medication is Bupropion (Wellbutrin), and the patient is taking for smoking cessation; OK to refill.          Passed - Medication is active on med list        Passed - Patient is age 18 or older        Passed - No active pregnancy on record        Passed - No positive pregnancy test in last 12 months          "

## 2019-07-16 RX ORDER — BUPROPION HYDROCHLORIDE 150 MG/1
TABLET ORAL
Qty: 150 TABLET | Refills: 0 | Status: SHIPPED | OUTPATIENT
Start: 2019-07-16 | End: 2019-12-23

## 2019-07-17 ENCOUNTER — HOSPITAL ENCOUNTER (OUTPATIENT)
Dept: PHYSICAL THERAPY | Facility: CLINIC | Age: 54
Setting detail: THERAPIES SERIES
End: 2019-07-17
Attending: FAMILY MEDICINE
Payer: COMMERCIAL

## 2019-07-17 PROCEDURE — 97110 THERAPEUTIC EXERCISES: CPT | Mod: GP

## 2019-07-17 PROCEDURE — 97140 MANUAL THERAPY 1/> REGIONS: CPT | Mod: GP

## 2019-07-19 ENCOUNTER — ANTICOAGULATION THERAPY VISIT (OUTPATIENT)
Dept: ANTICOAGULATION | Facility: CLINIC | Age: 54
End: 2019-07-19

## 2019-07-19 ENCOUNTER — OFFICE VISIT (OUTPATIENT)
Dept: PSYCHOLOGY | Facility: CLINIC | Age: 54
End: 2019-07-19
Payer: COMMERCIAL

## 2019-07-19 DIAGNOSIS — Z79.01 LONG TERM CURRENT USE OF ANTICOAGULANT THERAPY: ICD-10-CM

## 2019-07-19 DIAGNOSIS — I82.4Y9 DEEP VEIN THROMBOSIS (DVT) OF PROXIMAL LOWER EXTREMITY, UNSPECIFIED CHRONICITY, UNSPECIFIED LATERALITY (H): Chronic | ICD-10-CM

## 2019-07-19 DIAGNOSIS — I82.409 DVT (DEEP VENOUS THROMBOSIS) (H): ICD-10-CM

## 2019-07-19 DIAGNOSIS — F31.62 MODERATE MIXED BIPOLAR I DISORDER (H): Primary | ICD-10-CM

## 2019-07-19 DIAGNOSIS — I73.9 PVD (PERIPHERAL VASCULAR DISEASE) WITH CLAUDICATION (H): ICD-10-CM

## 2019-07-19 DIAGNOSIS — I82.4Y9 DEEP VEIN THROMBOSIS (DVT) OF PROXIMAL LOWER EXTREMITY, UNSPECIFIED CHRONICITY, UNSPECIFIED LATERALITY (H): ICD-10-CM

## 2019-07-19 DIAGNOSIS — Z86.73 HISTORY OF STROKE: ICD-10-CM

## 2019-07-19 DIAGNOSIS — I63.9 STROKE (H): ICD-10-CM

## 2019-07-19 LAB
BASOPHILS # BLD AUTO: 0 10E9/L (ref 0–0.2)
BASOPHILS NFR BLD AUTO: 0.7 %
DIFFERENTIAL METHOD BLD: ABNORMAL
EOSINOPHIL # BLD AUTO: 0 10E9/L (ref 0–0.7)
EOSINOPHIL NFR BLD AUTO: 1 %
ERYTHROCYTE [DISTWIDTH] IN BLOOD BY AUTOMATED COUNT: 19.9 % (ref 10–15)
HCT VFR BLD AUTO: 60.8 % (ref 35–47)
HGB BLD-MCNC: 18.8 G/DL (ref 11.7–15.7)
IMM GRANULOCYTES # BLD: 0 10E9/L (ref 0–0.4)
IMM GRANULOCYTES NFR BLD: 0.5 %
INR PPP: 1.57 (ref 0.86–1.14)
LYMPHOCYTES # BLD AUTO: 1.2 10E9/L (ref 0.8–5.3)
LYMPHOCYTES NFR BLD AUTO: 28.7 %
MCH RBC QN AUTO: 29.7 PG (ref 26.5–33)
MCHC RBC AUTO-ENTMCNC: 30.9 G/DL (ref 31.5–36.5)
MCV RBC AUTO: 96 FL (ref 78–100)
MONOCYTES # BLD AUTO: 0.3 10E9/L (ref 0–1.3)
MONOCYTES NFR BLD AUTO: 6.5 %
NEUTROPHILS # BLD AUTO: 2.6 10E9/L (ref 1.6–8.3)
NEUTROPHILS NFR BLD AUTO: 62.6 %
NRBC # BLD AUTO: 0 10*3/UL
NRBC BLD AUTO-RTO: 0 /100
PLATELET # BLD AUTO: 101 10E9/L (ref 150–450)
RBC # BLD AUTO: 6.33 10E12/L (ref 3.8–5.2)
WBC # BLD AUTO: 4.1 10E9/L (ref 4–11)

## 2019-07-19 PROCEDURE — 99207 ZZC NO CHARGE NURSE ONLY: CPT

## 2019-07-19 PROCEDURE — 85025 COMPLETE CBC W/AUTO DIFF WBC: CPT | Performed by: INTERNAL MEDICINE

## 2019-07-19 PROCEDURE — 90834 PSYTX W PT 45 MINUTES: CPT | Performed by: PSYCHOLOGIST

## 2019-07-19 PROCEDURE — 85610 PROTHROMBIN TIME: CPT | Performed by: INTERNAL MEDICINE

## 2019-07-19 PROCEDURE — 36415 COLL VENOUS BLD VENIPUNCTURE: CPT | Performed by: INTERNAL MEDICINE

## 2019-07-19 RX ORDER — WARFARIN SODIUM 5 MG/1
TABLET ORAL
Qty: 170 TABLET | Refills: 0 | Status: SHIPPED | OUTPATIENT
Start: 2019-07-19 | End: 2019-08-14

## 2019-07-19 ASSESSMENT — ANXIETY QUESTIONNAIRES
3. WORRYING TOO MUCH ABOUT DIFFERENT THINGS: NOT AT ALL
7. FEELING AFRAID AS IF SOMETHING AWFUL MIGHT HAPPEN: NOT AT ALL
5. BEING SO RESTLESS THAT IT IS HARD TO SIT STILL: MORE THAN HALF THE DAYS
1. FEELING NERVOUS, ANXIOUS, OR ON EDGE: MORE THAN HALF THE DAYS
6. BECOMING EASILY ANNOYED OR IRRITABLE: NEARLY EVERY DAY
4. TROUBLE RELAXING: MORE THAN HALF THE DAYS
2. NOT BEING ABLE TO STOP OR CONTROL WORRYING: SEVERAL DAYS
GAD7 TOTAL SCORE: 10

## 2019-07-19 ASSESSMENT — PATIENT HEALTH QUESTIONNAIRE - PHQ9: SUM OF ALL RESPONSES TO PHQ QUESTIONS 1-9: 9

## 2019-07-19 NOTE — PROGRESS NOTES
ANTICOAGULATION FOLLOW-UP CLINIC VISIT    Patient Name:  Bia Bill  Date:  2019  Contact Type:  Telephone    SUBJECTIVE:  Patient Findings     Positives:   Missed doses    Comments:   No changes in medications, activity, health, or diet noted. No bleeding or increased bruising noted.   Writer increased patient's dose to 67.5 mg for this week then patient will resume maintenance dose.   Recheck in 2 weeks.   Patient verbalizes understanding and agrees to plan. No further questions or concerns.  Denies S/S clotting.        Clinical Outcomes     Comments:   No changes in medications, activity, health, or diet noted. No bleeding or increased bruising noted.   Writer increased patient's dose to 67.5 mg for this week then patient will resume maintenance dose.   Recheck in 2 weeks.   Patient verbalizes understanding and agrees to plan. No further questions or concerns.  Denies S/S clotting.           OBJECTIVE    INR   Date Value Ref Range Status   2019 1.57 (H) 0.86 - 1.14 Final       ASSESSMENT / PLAN  INR assessment SUB    Recheck INR In: 2 WEEKS    INR Location Clinic      Anticoagulation Summary  As of 2019    INR goal:   2.0-2.5   TTR:   46.5 % (4.2 y)   INR used for dosin.57! (2019)   Warfarin maintenance plan:   7.5 mg (5 mg x 1.5) every Mon, Wed, Fri; 10 mg (5 mg x 2) all other days   Full warfarin instructions:   : 10 mg; : 10 mg; Otherwise 7.5 mg every Mon, Wed, Fri; 10 mg all other days   Weekly warfarin total:   62.5 mg   Plan last modified:   Reina Forrest RN (2019)   Next INR check:   2019   Priority:   INR   Target end date:   Indefinite    Indications    PVD (peripheral vascular disease) with claudication (H) [I73.9]  Long term current use of anticoagulant therapy (Resolved) [Z79.01]  DVT (deep venous thrombosis) (H) [I82.409]  Stroke (H) [I63.9]             Anticoagulation Episode Summary     INR check location:       Preferred lab:       Send  INR reminders to:   [place] PHONE [place] [facility]    Comments:   * lab draw due to elevated hematocrit (fingerstick meters don't work). LEFT LE. STENT x 3 LEFT UPPER LEG. Previous arterial clot. New goal range 2-2.5 starting 11/6/17.      Anticoagulation Care Providers     Provider Role Specialty Phone number    Kd Leong MD [facility] [phone]            See the Encounter Report to view Anticoagulation Flowsheet and Dosing Calendar (Go to Encounters tab in chart review, and find the Anticoagulation Therapy Visit)        Reina Forrest RN

## 2019-07-19 NOTE — PROGRESS NOTES
Progress Note  Disclaimer: This note consists of symbols derived from keyboarding, dictation and/or voice recognition software. As a result, there may be errors in the script that have gone undetected. Please consider this when interpreting information found in this chart.    Client Name: Bia Bill  Date: 7/19/2019         Service Type: Individual      Session Start Time: 1:00 PM   session End Time 1:45 PM   session Length: 45     Session #: 52     Attendees: Client attended alone    Treatment Plan Last Reviewed: 4/3/2019  PHQ-9 / CHIN-7 : See flowsheet     DATA  Client reports no falls since seeing her last.  She has gone 2 days without smoking, however her nephew is staying with her and he is a chain smoker.  We discussed the importance of establishing clear boundaries with family as they are frequently taking advantage of her.   Progress Since Last Session (Related to Symptoms / Goals / Homework):   Symptoms: Stable    Homework: Achieved / completed to satisfaction      Episode of Care Goals: Satisfactory progress - ACTION (Actively working towards change); Intervened by reinforcing change plan / affirming steps taken     Current / Ongoing Stressors and Concerns:   Recurrent depression  family relational problems, medical problems, chronic pain, trauma history      Treatment Objective(s) Addressed in This Session:   Increase interest, engagement, and pleasure in doing things  Decrease frequency and intensity of feeling down, depressed, hopeless       Intervention:   CBT: Behavioral activation supported client's effort at establishing maintaining healthy boundaries with her family.  Reinforced client's care for her grandchildren     ASSESSMENT: Current Emotional / Mental Status (status of significant symptoms):   Risk status (Self / Other harm or suicidal ideation)   Client denies current fears or concerns for personal safety.   Client denies current or recent  suicidal ideation or behaviors.   Client denies current or recent homicidal ideation or behaviors.   Client denies current or recent self injurious behavior or ideation.   Client denies other safety concerns.   A safety and risk management plan has not been developed at this time, however client was given the after-hours number / 911 should there be a change in any of these risk factors.     Appearance:   Appropriate    Eye Contact:   Good    Psychomotor Behavior: Normal    Attitude:   Cooperative    Orientation:   All   Speech    Rate / Production: Normal     Volume:  Normal    Mood:    Normal   Affect:    Appropriate    Thought Content:  Clear    Thought Form:  Coherent  Logical    Insight:    Good      Medication Review:   No changes to current psychiatric medication(s)     Medication Compliance:   Yes     Changes in Health Issues:   None reported     Chemical Use Review:   Substance Use: Chemical use reviewed, no active concerns identified      Tobacco Use: Client reports she is down to 1 cigarette per day from 2 packs/day.   Collateral Reports Completed:   Not Applicable    PLAN: (Client Tasks / Therapist Tasks / Other)  Client to continue With her self-care routine and maintenance of boundaries.    Practice one to 2 grounding techniques each day.   Client to maintain sobriety and contact with supportive sober others.  Kd Morris                                                         ________________________________________________________________________    Treatment Plan    Client's Name: Bia Bill  YOB: 1965    Date: 10/24/2017      DSM5 Diagnoses: (Sustained by DSM5 Criteria Listed Above)  Diagnoses: 296.40 Bipolar I Disorder Current or Most Recent Episode Manic, Unspecified  Psychosocial & Contextual Factors: financial difficulties, chronic pain, roommate issues  WHODAS 2.0 (12 item)               This questionnaire asks about difficulties due to health conditions. Health  conditions             include disease or illnesses, other health problems that may be short or long lasting,             injuries, mental health or emotional problems, and problems with alcohol or drugs.                         Think back over the past 30 days and answer these questions, thinking about how much             difficulty you had doing the following activities. For each question, please [place] only             one response.      S1  Standing for long periods such as 30 minutes?  Mild =           2    S2  Taking care of household responsibilities?  Moderate =   3    S3  Learning a new task, for example, learning how to get to a new place?  Mild =           2    S4  How much of a problem do you have joining community activities (for example, festivals, Evangel[employer] or other activities) in the same way as anyone else can?  Moderate =   3    S5  How much have you been emotionally affected by your health problems?  Mild =           2        In the past 30 days, how much difficulty did you have in:    S6  Concentrating on doing something for ten minutes?  Mild =           2    S7  Walking a long distance such as a kilometer (or equivalent)?  Severe =       4    S8  Washing your whole body?  None =         1    S9  Getting dressed?  None =         1    S10  Dealing with people you do not know?  Moderate =   3    S11  Maintaining a friendship?  Severe =       4    S12  Your day to day work?  Moderate =   3       H1  Overall, in the past 30 days, how many days were these difficulties present?  Record number of days seven    H2  In the past 30 days, for how many days were you totally unable to carry out your usual activities or work because of any health condition?  Record number of days  seven    H3  In the past 30 days, not counting the days that you were totally unable, for how many days did you cut back or reduce your usual activities or work because of any health condition?  Record number of days five                    Referral / Collaboration:  Referral to another professional/service is not indicated at this time..    Anticipated number of session or this episode of care: 15    Goals  1. Education- the Biopsychosocial model of depression  a. Client will be able to describe how depression is effecting them physically, emotionally and socially  2. Behavioral Activation  a. Client will learn to assess their depression on a day to day basis  b. Client will Identify two forms of exercise/activity and engage in them 3 times per week  c. Client will Identify 3 things that make them laugh, and engage in these 5 times per week.  d. Client will Identify 1-2Creative activities or hobbies  and engage in them 2 times per week  e. Client will identify music, movies, books that make them feel good and use them 3-4 times per week  3. Self-care  a. Client will identify 5 things they can do just for themselves  b. Client will take time for quiet, reflection, meditation 5 times per week  c. Client will Learn to set boundaries when appropriate  d. Client will Identify 2 individuals they can call on for support, distraction  4. Assessment of progress  a. Client will engage in assessment of their progress on a regular basis    Bipolar Disorder  Treatment plan:  1. Education- the Biopsychosocial model of Bipolar Disorder    a. Client will be able to describe in general terms what Bipolar Disorder  is and is not  b. Client will be able to describe how Bipolar Disorder  has affected their life in at least two different areas, such as school or work and Home/relationships  c. Clients parents/guardians or significant others will be provided information on what Bipolar Disorder  is and the ways it can affect relationships and be encouraged to be a part of clients treatment team.  2. Medication  a. Client will participate in medication evaluation for Bipolar Disorder  symptoms and follow medication recommendations.   3. Identification and management  of triggers  a. Client and therapist will examine patient s history to determine if there are predictable triggers for manic or depressive episodes (e.g. boredom, anger, family stress)  b. Client and therapist will develop means of diffusing these triggers (e.g. relaxation strategies, boundary setting, anger management)  4. Comorbid conditions   a. Client and therapist will asses for comorbid conditions (e.g. anxiety, depression, substance use). and add additional items to treatment plan as needed  5. Self-care  a. Client will identify 3 things they can do just for themselves  b. Client will take time for quiet, reflection, meditation 3 times per week  c. Client will Learn to set boundaries when appropriate  d. Client will Identify 2 individuals they can call on for support, distraction  5. Behavioral Activation  a. Client will Identify two forms of exercise/activity and engage in them 3 times per week  b. Client will Identify 2 things that make them laugh, and engage in these 3 times per week.  c. Client will Identify 2 Creative activities or hobbies  and engage in them 2 times per week  d. Client will identify music, movies, books that make them feel good and use them 3 times per week  6. Assessment of progress  a. Client will engage in assessment of their progress on a regular basis    Client has reviewed and agreed to the above plan.      Kd Morris  4/3/2018

## 2019-07-20 ASSESSMENT — ANXIETY QUESTIONNAIRES: GAD7 TOTAL SCORE: 10

## 2019-07-25 ENCOUNTER — HOSPITAL ENCOUNTER (OUTPATIENT)
Dept: PHYSICAL THERAPY | Facility: CLINIC | Age: 54
Setting detail: THERAPIES SERIES
End: 2019-07-25
Attending: FAMILY MEDICINE
Payer: COMMERCIAL

## 2019-07-25 PROCEDURE — 97110 THERAPEUTIC EXERCISES: CPT | Mod: GP | Performed by: PHYSICAL THERAPIST

## 2019-08-02 ENCOUNTER — ANTICOAGULATION THERAPY VISIT (OUTPATIENT)
Dept: ANTICOAGULATION | Facility: CLINIC | Age: 54
End: 2019-08-02

## 2019-08-02 ENCOUNTER — OFFICE VISIT (OUTPATIENT)
Dept: PSYCHOLOGY | Facility: CLINIC | Age: 54
End: 2019-08-02
Payer: COMMERCIAL

## 2019-08-02 DIAGNOSIS — Z86.73 HISTORY OF STROKE: ICD-10-CM

## 2019-08-02 DIAGNOSIS — I73.9 PVD (PERIPHERAL VASCULAR DISEASE) WITH CLAUDICATION (H): ICD-10-CM

## 2019-08-02 DIAGNOSIS — F31.62 MODERATE MIXED BIPOLAR I DISORDER (H): Primary | ICD-10-CM

## 2019-08-02 DIAGNOSIS — I82.409 DVT (DEEP VENOUS THROMBOSIS) (H): ICD-10-CM

## 2019-08-02 DIAGNOSIS — I82.4Y9 DEEP VEIN THROMBOSIS (DVT) OF PROXIMAL LOWER EXTREMITY, UNSPECIFIED CHRONICITY, UNSPECIFIED LATERALITY (H): Chronic | ICD-10-CM

## 2019-08-02 DIAGNOSIS — I63.9 STROKE (H): ICD-10-CM

## 2019-08-02 LAB
BASOPHILS # BLD AUTO: 0 10E9/L (ref 0–0.2)
BASOPHILS NFR BLD AUTO: 0.3 %
DIFFERENTIAL METHOD BLD: ABNORMAL
EOSINOPHIL # BLD AUTO: 0.1 10E9/L (ref 0–0.7)
EOSINOPHIL NFR BLD AUTO: 0.8 %
ERYTHROCYTE [DISTWIDTH] IN BLOOD BY AUTOMATED COUNT: 20.4 % (ref 10–15)
HCT VFR BLD AUTO: 59.9 % (ref 35–47)
HGB BLD-MCNC: 18.9 G/DL (ref 11.7–15.7)
INR PPP: 1.48 (ref 0.86–1.14)
LYMPHOCYTES # BLD AUTO: 1.4 10E9/L (ref 0.8–5.3)
LYMPHOCYTES NFR BLD AUTO: 24.3 %
MCH RBC QN AUTO: 29.8 PG (ref 26.5–33)
MCHC RBC AUTO-ENTMCNC: 31.6 G/DL (ref 31.5–36.5)
MCV RBC AUTO: 94 FL (ref 78–100)
MONOCYTES # BLD AUTO: 0.4 10E9/L (ref 0–1.3)
MONOCYTES NFR BLD AUTO: 7.1 %
NEUTROPHILS # BLD AUTO: 4 10E9/L (ref 1.6–8.3)
NEUTROPHILS NFR BLD AUTO: 67.5 %
PLATELET # BLD AUTO: 109 10E9/L (ref 150–450)
RBC # BLD AUTO: 6.35 10E12/L (ref 3.8–5.2)
WBC # BLD AUTO: 5.9 10E9/L (ref 4–11)

## 2019-08-02 PROCEDURE — 99207 ZZC NO CHARGE NURSE ONLY: CPT

## 2019-08-02 PROCEDURE — 85610 PROTHROMBIN TIME: CPT | Performed by: INTERNAL MEDICINE

## 2019-08-02 PROCEDURE — 36415 COLL VENOUS BLD VENIPUNCTURE: CPT | Performed by: INTERNAL MEDICINE

## 2019-08-02 PROCEDURE — 85025 COMPLETE CBC W/AUTO DIFF WBC: CPT | Performed by: INTERNAL MEDICINE

## 2019-08-02 PROCEDURE — 90834 PSYTX W PT 45 MINUTES: CPT | Performed by: PSYCHOLOGIST

## 2019-08-02 ASSESSMENT — ANXIETY QUESTIONNAIRES
1. FEELING NERVOUS, ANXIOUS, OR ON EDGE: MORE THAN HALF THE DAYS
7. FEELING AFRAID AS IF SOMETHING AWFUL MIGHT HAPPEN: NOT AT ALL
4. TROUBLE RELAXING: MORE THAN HALF THE DAYS
6. BECOMING EASILY ANNOYED OR IRRITABLE: MORE THAN HALF THE DAYS
GAD7 TOTAL SCORE: 10
5. BEING SO RESTLESS THAT IT IS HARD TO SIT STILL: MORE THAN HALF THE DAYS
2. NOT BEING ABLE TO STOP OR CONTROL WORRYING: SEVERAL DAYS
3. WORRYING TOO MUCH ABOUT DIFFERENT THINGS: SEVERAL DAYS

## 2019-08-02 ASSESSMENT — PATIENT HEALTH QUESTIONNAIRE - PHQ9: SUM OF ALL RESPONSES TO PHQ QUESTIONS 1-9: 10

## 2019-08-02 NOTE — PROGRESS NOTES
Progress Note  Disclaimer: This note consists of symbols derived from keyboarding, dictation and/or voice recognition software. As a result, there may be errors in the script that have gone undetected. Please consider this when interpreting information found in this chart.    Client Name: Bia Bill  Date: 8/2/2019         Service Type: Individual      Session Start Time: 1:00 PM   session End Time 1:45 PM   session Length: 45     Session #: 53     Attendees: Client attended alone    Treatment Plan Last Reviewed: 4/3/2019  PHQ-9 / CHIN-7 : See flowsheet     DATA  Client reports no falls since seeing her last.  Presents today only using a cane.  Client returns today to share her grief over the recent death of a long-term best friend.  She found herself feeling mad, depressed, and irritable.  We talked about the process of grieving.   Progress Since Last Session (Related to Symptoms / Goals / Homework):   Symptoms: Stable    Homework: Achieved / completed to satisfaction      Episode of Care Goals: Satisfactory progress - ACTION (Actively working towards change); Intervened by reinforcing change plan / affirming steps taken     Current / Ongoing Stressors and Concerns:   Recurrent depression  family relational problems, medical problems, chronic pain, trauma history, recent death of longtime friend.     Treatment Objective(s) Addressed in This Session:   Increase interest, engagement, and pleasure in doing things  Decrease frequency and intensity of feeling down, depressed, hopeless       Intervention:   CBT: Behavioral activation supported client's effort at establishing maintaining healthy boundaries with her family.  Reinforced client's care for her grandchildren     ASSESSMENT: Current Emotional / Mental Status (status of significant symptoms):   Risk status (Self / Other harm or suicidal ideation)   Client denies current fears or concerns for personal  safety.   Client denies current or recent suicidal ideation or behaviors.   Client denies current or recent homicidal ideation or behaviors.   Client denies current or recent self injurious behavior or ideation.   Client denies other safety concerns.   A safety and risk management plan has not been developed at this time, however client was given the after-hours number / 911 should there be a change in any of these risk factors.     Appearance:   Appropriate    Eye Contact:   Good    Psychomotor Behavior: Normal    Attitude:   Cooperative    Orientation:   All   Speech    Rate / Production: Normal     Volume:  Normal    Mood:    Normal   Affect:    Appropriate    Thought Content:  Clear    Thought Form:  Coherent  Logical    Insight:    Good      Medication Review:   No changes to current psychiatric medication(s)     Medication Compliance:   Yes     Changes in Health Issues:   None reported     Chemical Use Review:   Substance Use: Chemical use reviewed, no active concerns identified      Tobacco Use: Client reports she is down to 1 cigarette per day from 2 packs/day.   Collateral Reports Completed:   Not Applicable    PLAN: (Client Tasks / Therapist Tasks / Other)  Client to continue With her self-care routine and maintenance of boundaries.    Practice one to 2 grounding techniques each day.   Client to maintain sobriety and contact with supportive sober others.  Kd Morris                                                         ________________________________________________________________________    Treatment Plan    Client's Name: Bia Bill  YOB: 1965    Date: 10/24/2017      DSM5 Diagnoses: (Sustained by DSM5 Criteria Listed Above)  Diagnoses: 296.40 Bipolar I Disorder Current or Most Recent Episode Manic, Unspecified  Psychosocial & Contextual Factors: financial difficulties, chronic pain, roommate issues  WHODAS 2.0 (12 item)               This questionnaire asks about  difficulties due to health conditions. Health conditions             include disease or illnesses, other health problems that may be short or long lasting,             injuries, mental health or emotional problems, and problems with alcohol or drugs.                         Think back over the past 30 days and answer these questions, thinking about how much             difficulty you had doing the following activities. For each question, please [place] only             one response.      S1  Standing for long periods such as 30 minutes?  Mild =           2    S2  Taking care of household responsibilities?  Moderate =   3    S3  Learning a new task, for example, learning how to get to a new place?  Mild =           2    S4  How much of a problem do you have joining community activities (for example, festivals, [employer] or other activities) in the same way as anyone else can?  Moderate =   3    S5  How much have you been emotionally affected by your health problems?  Mild =           2        In the past 30 days, how much difficulty did you have in:    S6  Concentrating on doing something for ten minutes?  Mild =           2    S7  Walking a long distance such as a kilometer (or equivalent)?  Severe =       4    S8  Washing your whole body?  None =         1    S9  Getting dressed?  None =         1    S10  Dealing with people you do not know?  Moderate =   3    S11  Maintaining a friendship?  Severe =       4    S12  Your day to day work?  Moderate =   3       H1  Overall, in the past 30 days, how many days were these difficulties present?  Record number of days seven    H2  In the past 30 days, for how many days were you totally unable to carry out your usual activities or work because of any health condition?  Record number of days  seven    H3  In the past 30 days, not counting the days that you were totally unable, for how many days did you cut back or reduce your usual activities or work because of any health  condition?  Record number of days five                   Referral / Collaboration:  Referral to another professional/service is not indicated at this time..    Anticipated number of session or this episode of care: 15    Goals  1. Education- the Biopsychosocial model of depression  a. Client will be able to describe how depression is effecting them physically, emotionally and socially  2. Behavioral Activation  a. Client will learn to assess their depression on a day to day basis  b. Client will Identify two forms of exercise/activity and engage in them 3 times per week  c. Client will Identify 3 things that make them laugh, and engage in these 5 times per week.  d. Client will Identify 1-2Creative activities or hobbies  and engage in them 2 times per week  e. Client will identify music, movies, books that make them feel good and use them 3-4 times per week  3. Self-care  a. Client will identify 5 things they can do just for themselves  b. Client will take time for quiet, reflection, meditation 5 times per week  c. Client will Learn to set boundaries when appropriate  d. Client will Identify 2 individuals they can call on for support, distraction  4. Assessment of progress  a. Client will engage in assessment of their progress on a regular basis    Bipolar Disorder  Treatment plan:  1. Education- the Biopsychosocial model of Bipolar Disorder    a. Client will be able to describe in general terms what Bipolar Disorder  is and is not  b. Client will be able to describe how Bipolar Disorder  has affected their life in at least two different areas, such as school or work and Home/relationships  c. Clients parents/guardians or significant others will be provided information on what Bipolar Disorder  is and the ways it can affect relationships and be encouraged to be a part of clients treatment team.  2. Medication  a. Client will participate in medication evaluation for Bipolar Disorder  symptoms and follow medication  recommendations.   3. Identification and management of triggers  a. Client and therapist will examine patient s history to determine if there are predictable triggers for manic or depressive episodes (e.g. boredom, anger, family stress)  b. Client and therapist will develop means of diffusing these triggers (e.g. relaxation strategies, boundary setting, anger management)  4. Comorbid conditions   a. Client and therapist will asses for comorbid conditions (e.g. anxiety, depression, substance use). and add additional items to treatment plan as needed  5. Self-care  a. Client will identify 3 things they can do just for themselves  b. Client will take time for quiet, reflection, meditation 3 times per week  c. Client will Learn to set boundaries when appropriate  d. Client will Identify 2 individuals they can call on for support, distraction  5. Behavioral Activation  a. Client will Identify two forms of exercise/activity and engage in them 3 times per week  b. Client will Identify 2 things that make them laugh, and engage in these 3 times per week.  c. Client will Identify 2 Creative activities or hobbies  and engage in them 2 times per week  d. Client will identify music, movies, books that make them feel good and use them 3 times per week  6. Assessment of progress  a. Client will engage in assessment of their progress on a regular basis    Client has reviewed and agreed to the above plan.      Kd Morris  4/3/2018

## 2019-08-02 NOTE — PROGRESS NOTES
ANTICOAGULATION FOLLOW-UP CLINIC VISIT    Patient Name:  Bia Bill  Date:  8/2/2019  Contact Type:  Telephone/ spoke with Norah    SUBJECTIVE:  Patient Findings     Positives:   Change in diet/appetite (not eating well), Other complaints (depressed)    Comments:   Patient walked into [facility] office today after her appointment with Kd Morris. She states her best friend passed away on Monday and she has been very depressed. She is not eating well as she has no appetite due to grief. She is seeing Dr. Morris (psychologist) and is talking to him. She also states she recently started abilify a couple months ago and it has not helped her symptoms. She denies missing any warfarin doses and is taking as directed by [facility]. This is the second INR well below goal range (last low INR was just after a missed dose). Will increase weekly dosing with a booster dose today and tomorrow. She will have had 67.5 mg/week by next recheck at lab. Usual dose is 62.5 mg (~7.5 increase).         Clinical Outcomes     Comments:   Patient walked into [facility] office today after her appointment with Kd Morris. She states her best friend passed away on Monday and she has been very depressed. She is not eating well as she has no appetite due to grief. She is seeing Dr. Morris (psychologist) and is talking to him. She also states she recently started abilify a couple months ago and it has not helped her symptoms. She denies missing any warfarin doses and is taking as directed by [facility]. This is the second INR well below goal range (last low INR was just after a missed dose). Will increase weekly dosing with a booster dose today and tomorrow. She will have had 67.5 mg/week by next recheck at lab. Usual dose is 62.5 mg (~7.5 increase).            OBJECTIVE    INR   Date Value Ref Range Status   08/02/2019 1.48 (H) 0.86 - 1.14 Final     Comment:     Special tube used to correct for high hematocrit       ASSESSMENT / PLAN  INR assessment SUB     Recheck INR In: 6 DAYS    INR Location Clinic lab     Anticoagulation Summary  As of 2019    INR goal:   2.0-2.5   TTR:   46.1 % (4.3 y)   INR used for dosin.48! (2019)   Warfarin maintenance plan:   7.5 mg (5 mg x 1.5) every Mon, Wed, Fri; 10 mg (5 mg x 2) all other days   Full warfarin instructions:   : 12.5 mg; 8/3: 12.5 mg; 8: 10 mg; 6: 7.5 mg; 8/7: 10 mg; Otherwise 7.5 mg every Mon, Wed, Fri; 10 mg all other days   Weekly warfarin total:   62.5 mg   Plan last modified:   Mireille Torres RN (2019)   Next INR check:   2019   Priority:   INR   Target end date:   Indefinite    Indications    PVD (peripheral vascular disease) with claudication (H) [I73.9]  Long term current use of anticoagulant therapy (Resolved) [Z79.01]  DVT (deep venous thrombosis) (H) [I82.409]  Stroke (H) [I63.9]             Anticoagulation Episode Summary     INR check location:       Preferred lab:       Send INR reminders to:   [employer] POOL    Comments:   * lab draw due to elevated hematocrit (fingerstick meters don't work). LEFT LE. STENT x 3 LEFT UPPER LEG. Previous arterial clot. New goal range 2-2.5 starting 17.      Anticoagulation Care Providers     Provider Role Specialty Phone number    Kd Leong MD [facility] [phone]            See the Encounter Report to view Anticoagulation Flowsheet and Dosing Calendar (Go to Encounters tab in chart review, and find the Anticoagulation Therapy Visit)        Mireille Torres RN

## 2019-08-03 ASSESSMENT — ANXIETY QUESTIONNAIRES: GAD7 TOTAL SCORE: 10

## 2019-08-08 ENCOUNTER — ANTICOAGULATION THERAPY VISIT (OUTPATIENT)
Dept: ANTICOAGULATION | Facility: CLINIC | Age: 54
End: 2019-08-08

## 2019-08-08 DIAGNOSIS — I73.9 PVD (PERIPHERAL VASCULAR DISEASE) WITH CLAUDICATION (H): ICD-10-CM

## 2019-08-08 DIAGNOSIS — Z86.73 HISTORY OF STROKE: ICD-10-CM

## 2019-08-08 DIAGNOSIS — Z79.01 LONG TERM CURRENT USE OF ANTICOAGULANT THERAPY: ICD-10-CM

## 2019-08-08 DIAGNOSIS — I63.9 STROKE (H): ICD-10-CM

## 2019-08-08 DIAGNOSIS — I82.409 DVT (DEEP VENOUS THROMBOSIS) (H): ICD-10-CM

## 2019-08-08 LAB
HCT VFR BLD AUTO: 61.8 % (ref 35–47)
INR PPP: 1.82 (ref 0.86–1.14)

## 2019-08-08 PROCEDURE — 99207 ZZC NO CHARGE NURSE ONLY: CPT

## 2019-08-08 PROCEDURE — 85014 HEMATOCRIT: CPT | Performed by: FAMILY MEDICINE

## 2019-08-08 PROCEDURE — 85610 PROTHROMBIN TIME: CPT | Performed by: FAMILY MEDICINE

## 2019-08-08 PROCEDURE — 36415 COLL VENOUS BLD VENIPUNCTURE: CPT | Performed by: FAMILY MEDICINE

## 2019-08-08 NOTE — PROGRESS NOTES
ANTICOAGULATION FOLLOW-UP CLINIC VISIT    Patient Name:  Bia Bill  Date:  8/8/2019  Contact Type:  Telephone    SUBJECTIVE:  Patient Findings     Positives:   Change in health (Increase in pain in hip. SOB - pt was able to speak in full sentences and states that it was with activity. advised pt to be evlauted due to new SOB), Change in activity (Less activity)    Comments:   No changes in medications or diet noted. No concerns with clotting, bleeding, or increased bruising noted. Took warfarin as prescribed.  Denies any missed doses. Pt confirmed dosing.  Patient had 72.5 mg in the last 7 days, will adjust dose to 72.5 mg by next INR check in one week.   Pt did read directions back to RN.  Patient educated on the increased risk for a clot/stroke, precautions to take, S &S of a clot/stroke, and when to seek medical attention. Denies concerns or S&S of a clot.  Patient verbalizes understanding and agrees to plan. No further questions or concerns.        Clinical Outcomes     Negatives:   Major bleeding event, Thromboembolic event, Anticoagulation-related hospital admission, Anticoagulation-related ED visit, Anticoagulation-related fatality    Comments:   No changes in medications or diet noted. No concerns with clotting, bleeding, or increased bruising noted. Took warfarin as prescribed.  Denies any missed doses. Pt confirmed dosing.  Patient had 72.5 mg in the last 7 days, will adjust dose to 72.5 mg by next INR check in one week.   Pt did read directions back to RN.  Patient educated on the increased risk for a clot/stroke, precautions to take, S &S of a clot/stroke, and when to seek medical attention. Denies concerns or S&S of a clot.  Patient verbalizes understanding and agrees to plan. No further questions or concerns.           OBJECTIVE    INR   Date Value Ref Range Status   08/08/2019 1.82 (H) 0.86 - 1.14 Final     Comment:     Special tube used to correct for high hematocrit       ASSESSMENT /  PLAN  INR assessment SUB    Recheck INR In: 1 WEEK    INR Location Outside lab      Anticoagulation Summary  As of 2019    INR goal:   2.0-2.5   TTR:   45.9 % (4.3 y)   INR used for dosin.82! (2019)   Warfarin maintenance plan:   7.5 mg (5 mg x 1.5) every Mon, Wed, Fri; 10 mg (5 mg x 2) all other days   Full warfarin instructions:   : 15 mg; : 10 mg; : 10 mg; Otherwise 7.5 mg every Mon, Wed, Fri; 10 mg all other days   Weekly warfarin total:   62.5 mg   Plan last modified:   Mireille Torres RN (2019)   Next INR check:   8/15/2019   Priority:   INR   Target end date:   Indefinite    Indications    PVD (peripheral vascular disease) with claudication (H) [I73.9]  Long term current use of anticoagulant therapy (Resolved) [Z79.01]  DVT (deep venous thrombosis) (H) [I82.409]  Stroke (H) [I63.9]             Anticoagulation Episode Summary     INR check location:       Preferred lab:       Send INR reminders to:   [employer] POOL    Comments:   * lab draw due to elevated hematocrit (fingerstick meters don't work). LEFT LE. STENT x 3 LEFT UPPER LEG. Previous arterial clot. New goal range 2-2.5 starting 17.      Anticoagulation Care Providers     Provider Role Specialty Phone number    Kd Leong MD [facility] [phone]            See the Encounter Report to view Anticoagulation Flowsheet and Dosing Calendar (Go to Encounters tab in chart review, and find the Anticoagulation Therapy Visit)        Jaleesa Coronado RN

## 2019-08-14 ENCOUNTER — ANTICOAGULATION THERAPY VISIT (OUTPATIENT)
Dept: ANTICOAGULATION | Facility: CLINIC | Age: 54
End: 2019-08-14

## 2019-08-14 DIAGNOSIS — I73.9 PVD (PERIPHERAL VASCULAR DISEASE) WITH CLAUDICATION (H): ICD-10-CM

## 2019-08-14 DIAGNOSIS — I82.4Y9 DEEP VEIN THROMBOSIS (DVT) OF PROXIMAL LOWER EXTREMITY, UNSPECIFIED CHRONICITY, UNSPECIFIED LATERALITY (H): Chronic | ICD-10-CM

## 2019-08-14 DIAGNOSIS — Z86.73 HISTORY OF STROKE: ICD-10-CM

## 2019-08-14 DIAGNOSIS — I63.9 STROKE (H): ICD-10-CM

## 2019-08-14 DIAGNOSIS — I82.409 DVT (DEEP VENOUS THROMBOSIS) (H): ICD-10-CM

## 2019-08-14 DIAGNOSIS — Z79.01 LONG TERM CURRENT USE OF ANTICOAGULANT THERAPY: ICD-10-CM

## 2019-08-14 LAB
BASOPHILS # BLD AUTO: 0 10E9/L (ref 0–0.2)
BASOPHILS NFR BLD AUTO: 0.9 %
DIFFERENTIAL METHOD BLD: ABNORMAL
EOSINOPHIL # BLD AUTO: 0.1 10E9/L (ref 0–0.7)
EOSINOPHIL NFR BLD AUTO: 1.1 %
ERYTHROCYTE [DISTWIDTH] IN BLOOD BY AUTOMATED COUNT: 19.7 % (ref 10–15)
HCT VFR BLD AUTO: 65 % (ref 35–47)
HGB BLD-MCNC: 19.2 G/DL (ref 11.7–15.7)
IMM GRANULOCYTES # BLD: 0 10E9/L (ref 0–0.4)
IMM GRANULOCYTES NFR BLD: 0.4 %
INR PPP: 2.39 (ref 0.86–1.14)
LYMPHOCYTES # BLD AUTO: 1.2 10E9/L (ref 0.8–5.3)
LYMPHOCYTES NFR BLD AUTO: 26.6 %
MCH RBC QN AUTO: 28.7 PG (ref 26.5–33)
MCHC RBC AUTO-ENTMCNC: 29.5 G/DL (ref 31.5–36.5)
MCV RBC AUTO: 97 FL (ref 78–100)
MONOCYTES # BLD AUTO: 0.3 10E9/L (ref 0–1.3)
MONOCYTES NFR BLD AUTO: 6.1 %
NEUTROPHILS # BLD AUTO: 3 10E9/L (ref 1.6–8.3)
NEUTROPHILS NFR BLD AUTO: 64.9 %
NRBC # BLD AUTO: 0 10*3/UL
NRBC BLD AUTO-RTO: 1 /100
PLATELET # BLD AUTO: 102 10E9/L (ref 150–450)
RBC # BLD AUTO: 6.69 10E12/L (ref 3.8–5.2)
WBC # BLD AUTO: 4.6 10E9/L (ref 4–11)

## 2019-08-14 PROCEDURE — 36415 COLL VENOUS BLD VENIPUNCTURE: CPT | Performed by: FAMILY MEDICINE

## 2019-08-14 PROCEDURE — 85610 PROTHROMBIN TIME: CPT | Performed by: FAMILY MEDICINE

## 2019-08-14 PROCEDURE — 85025 COMPLETE CBC W/AUTO DIFF WBC: CPT | Performed by: INTERNAL MEDICINE

## 2019-08-14 PROCEDURE — 99207 ZZC NO CHARGE NURSE ONLY: CPT

## 2019-08-14 RX ORDER — WARFARIN SODIUM 5 MG/1
TABLET ORAL
Qty: 170 TABLET | Refills: 0 | Status: ON HOLD | COMMUNITY
Start: 2019-08-14 | End: 2019-10-31

## 2019-08-14 NOTE — PROGRESS NOTES
ANTICOAGULATION FOLLOW-UP CLINIC VISIT    Patient Name:  Bia Bill  Date:  2019  Contact Type:  Telephone    SUBJECTIVE:  Patient Findings     Positives:   Extra doses    Comments:   No changes in medications, activity, health, or diet noted. No bleeding or increased bruising noted. Took warfarin as prescribed.  Patient had 72.5 mg in the past 7 days. Writer adjusted dose to 70 mg weekly.   Recheck in 2 weeks.   Patient verbalizes understanding and agrees to plan. No further questions or concerns.          Clinical Outcomes     Comments:   No changes in medications, activity, health, or diet noted. No bleeding or increased bruising noted. Took warfarin as prescribed.  Patient had 72.5 mg in the past 7 days. Writer adjusted dose to 70 mg weekly.   Recheck in 2 weeks.   Patient verbalizes understanding and agrees to plan. No further questions or concerns.             OBJECTIVE    INR   Date Value Ref Range Status   2019 2.39 (H) 0.86 - 1.14 Final     Comment:     Special tube used to correct for high hematocrit       ASSESSMENT / PLAN  INR assessment THER    Recheck INR In: 2 WEEKS    INR Location Outside lab      Anticoagulation Summary  As of 2019    INR goal:   2.0-2.5   TTR:   46.0 % (4.3 y)   INR used for dosin.39 (2019)   Warfarin maintenance plan:   10 mg (5 mg x 2) every day   Full warfarin instructions:   : 10 mg; Otherwise 10 mg every day   Weekly warfarin total:   70 mg   Plan last modified:   Reina Forrest RN (2019)   Next INR check:   2019   Priority:   INR   Target end date:   Indefinite    Indications    PVD (peripheral vascular disease) with claudication (H) [I73.9]  Long term current use of anticoagulant therapy (Resolved) [Z79.01]  DVT (deep venous thrombosis) (H) [I82.409]  Stroke (H) [I63.9]             Anticoagulation Episode Summary     INR check location:       Preferred lab:       Send INR reminders to:   [place] [facility]TICO[facility] POOL     Comments:   * lab draw due to elevated hematocrit (fingerstick meters don't work). LEFT LE. STENT x 3 LEFT UPPER LEG. Previous arterial clot. New goal range 2-2.5 starting 11/6/17.      Anticoagulation Care Providers     Provider Role Specialty Phone number    Kd Leong MD [facility] [phone]            See the Encounter Report to view Anticoagulation Flowsheet and Dosing Calendar (Go to Encounters tab in chart review, and find the Anticoagulation Therapy Visit)        Reina Forrest RN

## 2019-08-14 NOTE — RESULT ENCOUNTER NOTE
Called and reviewed results with the patient per Dr. Lomax. Patient had no further questions or concerns at this time and also verbalized understanding. Direct line provided if any further questions/concerns arise.

## 2019-08-14 NOTE — RESULT ENCOUNTER NOTE
Message left for patient to return call to clinic to review results and recommendations. Direct line provided.

## 2019-08-21 ENCOUNTER — INFUSION THERAPY VISIT (OUTPATIENT)
Dept: INFUSION THERAPY | Facility: CLINIC | Age: 54
End: 2019-08-21
Attending: INTERNAL MEDICINE
Payer: COMMERCIAL

## 2019-08-21 VITALS — HEART RATE: 73 BPM | DIASTOLIC BLOOD PRESSURE: 53 MMHG | SYSTOLIC BLOOD PRESSURE: 125 MMHG

## 2019-08-21 DIAGNOSIS — D75.1 POLYCYTHEMIA, SECONDARY: Primary | ICD-10-CM

## 2019-08-21 PROCEDURE — 99195 PHLEBOTOMY: CPT

## 2019-08-21 NOTE — PROGRESS NOTES
Infusion Nursing Note:  Bia Bill presents today for a phlebotomy.    Patient seen by provider today: No   present during visit today: Not Applicable.    Note: Phlebotomy performed per [place] protocol without complications. Pt. denies any dizziness or lightheadedness. Pt. To return in 1 week for a phlebotomy.    Intravenous Access:  No Intravenous access/labs at this visit.  Peripheral IV placed.    Treatment Conditions:  Not Applicable.      Post Infusion Assessment:  Blood return noted pre and post infusion.  Site patent and intact, free from redness, edema or discomfort.  No evidence of extravasations.  Access discontinued per protocol.       Discharge Plan:   Patient and/or family verbalized understanding of discharge instructions and all questions answered.  Patient discharged in stable condition accompanied by: self.  Departure Mode: Ambulatory.    Ghazal Cabrera, RN, RN

## 2019-08-28 ENCOUNTER — INFUSION THERAPY VISIT (OUTPATIENT)
Dept: INFUSION THERAPY | Facility: CLINIC | Age: 54
End: 2019-08-28
Attending: INTERNAL MEDICINE
Payer: COMMERCIAL

## 2019-08-28 VITALS
TEMPERATURE: 97.9 F | DIASTOLIC BLOOD PRESSURE: 51 MMHG | SYSTOLIC BLOOD PRESSURE: 115 MMHG | HEART RATE: 74 BPM | RESPIRATION RATE: 18 BRPM

## 2019-08-28 DIAGNOSIS — D75.1 POLYCYTHEMIA, SECONDARY: Primary | ICD-10-CM

## 2019-08-28 DIAGNOSIS — F33.1 MAJOR DEPRESSIVE DISORDER, RECURRENT EPISODE, MODERATE (H): ICD-10-CM

## 2019-08-28 PROCEDURE — 99195 PHLEBOTOMY: CPT

## 2019-08-28 RX ORDER — ARIPIPRAZOLE 10 MG/1
TABLET ORAL
Qty: 90 TABLET | Refills: 1 | Status: SHIPPED | OUTPATIENT
Start: 2019-08-28 | End: 2020-02-17

## 2019-08-28 ASSESSMENT — PAIN SCALES - GENERAL: PAINLEVEL: NO PAIN (0)

## 2019-08-28 NOTE — TELEPHONE ENCOUNTER
abilify      Last Written Prescription Date:  2/26/19  Last Fill Quantity: 90,   # refills: 1  Last Office Visit: 6/19/19  Future Office visit:    Next 5 appointments (look out 90 days)    Aug 30, 2019 12:00 PM CDT  [facility] [address] [place] [address]  [phone]   Sep 13, 2019  1:00 PM CDT  Return Visit with Kd CHAWLA [facility] [address] [address]  [phone]   Sep 18, 2019 11:00 AM CDT  Return Visit with Joshua Watts MD  [facility] ([facility] [address]  [facility] [place] [address]  [phone]           Routing refill request to provider for review/approval because:  Drug not on the [facility], [facility] or [facility] refill protocol or controlled substance

## 2019-08-28 NOTE — PROGRESS NOTES
Infusion Nursing Note:  Bia Bill presents today for Phlebotomy.    Patient seen by provider today: No   present during visit today: Not Applicable.    Note: Patient states she gets lightheaded from phlebotomies; informed her we can give her IVF post phlebotomy if needed. Instructed patient to drink plenty of fluids pre and post phleb.  Replaced 480cc fluids orally.    Intravenous Access:  Phlebotomy needle placed in right AC; 500 ml whole blood removed    Treatment Conditions:  Results reviewed, labs MET treatment parameters, ok to proceed with treatment.  hematocrit 65 on 8/14    Post Infusion Assessment:  Patient tolerated phlebotomy without incident. Denies any dizziness/lightheadedness prior to discharge.  Site patent and intact, free from redness, edema or discomfort.  No evidence of extravasations.  Access discontinued per protocol.     Discharge Plan:   Discharge instructions reviewed with: Patient.  Patient and/or family verbalized understanding of discharge instructions and all questions answered.  Copy of AVS reviewed with patient and/or family.  Patient will return 9/4/2019 for next appointment.  Patient discharged in stable condition accompanied by: self.  Departure Mode: Ambulatory.    Yisel Nguyen RN

## 2019-08-30 ENCOUNTER — OFFICE VISIT (OUTPATIENT)
Dept: PSYCHOLOGY | Facility: CLINIC | Age: 54
End: 2019-08-30
Payer: COMMERCIAL

## 2019-08-30 DIAGNOSIS — F31.62 MODERATE MIXED BIPOLAR I DISORDER (H): Primary | ICD-10-CM

## 2019-08-30 PROCEDURE — 90834 PSYTX W PT 45 MINUTES: CPT | Performed by: PSYCHOLOGIST

## 2019-08-30 ASSESSMENT — PATIENT HEALTH QUESTIONNAIRE - PHQ9: SUM OF ALL RESPONSES TO PHQ QUESTIONS 1-9: 9

## 2019-08-30 ASSESSMENT — ANXIETY QUESTIONNAIRES
5. BEING SO RESTLESS THAT IT IS HARD TO SIT STILL: SEVERAL DAYS
GAD7 TOTAL SCORE: 7
3. WORRYING TOO MUCH ABOUT DIFFERENT THINGS: NOT AT ALL
4. TROUBLE RELAXING: MORE THAN HALF THE DAYS
6. BECOMING EASILY ANNOYED OR IRRITABLE: NEARLY EVERY DAY
2. NOT BEING ABLE TO STOP OR CONTROL WORRYING: NOT AT ALL
7. FEELING AFRAID AS IF SOMETHING AWFUL MIGHT HAPPEN: NOT AT ALL
1. FEELING NERVOUS, ANXIOUS, OR ON EDGE: SEVERAL DAYS

## 2019-08-30 NOTE — PROGRESS NOTES
Progress Note  Disclaimer: This note consists of symbols derived from keyboarding, dictation and/or voice recognition software. As a result, there may be errors in the script that have gone undetected. Please consider this when interpreting information found in this chart.    Client Name: Bia Bill  Date: 8/30/2019         Service Type: Individual      Session Start Time: 12:00 PM    session End Time 12:45 PM   session Length: 45     Session #: 54     Attendees: Client attended alone    Treatment Plan Last Reviewed: 4/3/2019  PHQ-9 / CHIN-7 : See flowsheet     DATA  Client reports no falls since seeing her last.  Presents today with her daughter.  They are going out of town for a holiday weekend.  Additional medical concerns have been noted, see EMR infusion therapy notes.   Progress Since Last Session (Related to Symptoms / Goals / Homework):   Symptoms: Stable    Homework: Achieved / completed to satisfaction      Episode of Care Goals: Satisfactory progress - ACTION (Actively working towards change); Intervened by reinforcing change plan / affirming steps taken     Current / Ongoing Stressors and Concerns:   Recurrent depression  family relational problems, medical problems, chronic pain, trauma history, recent death of longtime friend.     Treatment Objective(s) Addressed in This Session:   Increase interest, engagement, and pleasure in doing things  Decrease frequency and intensity of feeling down, depressed, hopeless       Intervention:   CBT: Behavioral activation supported client's effort at establishing maintaining healthy boundaries with her family.  Reinforced client's care for her grandchildren     ASSESSMENT: Current Emotional / Mental Status (status of significant symptoms):   Risk status (Self / Other harm or suicidal ideation)   Client denies current fears or concerns for personal safety.   Client denies current or recent suicidal ideation or  behaviors.   Client denies current or recent homicidal ideation or behaviors.   Client denies current or recent self injurious behavior or ideation.   Client denies other safety concerns.   A safety and risk management plan has not been developed at this time, however client was given the after-hours number / 911 should there be a change in any of these risk factors.     Appearance:   Appropriate    Eye Contact:   Good    Psychomotor Behavior: Normal    Attitude:   Cooperative    Orientation:   All   Speech    Rate / Production: Normal     Volume:  Normal    Mood:    Normal   Affect:    Appropriate    Thought Content:  Clear    Thought Form:  Coherent  Logical    Insight:    Good      Medication Review:   No changes to current psychiatric medication(s)     Medication Compliance:   Yes     Changes in Health Issues:   None reported     Chemical Use Review:   Substance Use: Chemical use reviewed, no active concerns identified      Tobacco Use: Client reports she is down to 1 cigarette per day from 2 packs/day.   Collateral Reports Completed:   Not Applicable    PLAN: (Client Tasks / Therapist Tasks / Other)  Client to continue With her self-care routine and maintenance of boundaries.    Practice one to 2 grounding techniques each day.   Client to maintain sobriety and contact with supportive sober others.  Have an awesome weekend.  Kd Morris                                                         ________________________________________________________________________    Treatment Plan    Client's Name: Bia Bill  YOB: 1965    Date: 10/24/2017      DSM5 Diagnoses: (Sustained by DSM5 Criteria Listed Above)  Diagnoses: 296.40 Bipolar I Disorder Current or Most Recent Episode Manic, Unspecified  Psychosocial & Contextual Factors: financial difficulties, chronic pain, roommate issues  WHODAS 2.0 (12 item)               This questionnaire asks about difficulties due to health conditions. Health  conditions             include disease or illnesses, other health problems that may be short or long lasting,             injuries, mental health or emotional problems, and problems with alcohol or drugs.                         Think back over the past 30 days and answer these questions, thinking about how much             difficulty you had doing the following activities. For each question, please [place] only             one response.      S1  Standing for long periods such as 30 minutes?  Mild =           2    S2  Taking care of household responsibilities?  Moderate =   3    S3  Learning a new task, for example, learning how to get to a new place?  Mild =           2    S4  How much of a problem do you have joining community activities (for example, festivals, Congregation or other activities) in the same way as anyone else can?  Moderate =   3    S5  How much have you been emotionally affected by your health problems?  Mild =           2        In the past 30 days, how much difficulty did you have in:    S6  Concentrating on doing something for ten minutes?  Mild =           2    S7  Walking a long distance such as a kilometer (or equivalent)?  Severe =       4    S8  Washing your whole body?  None =         1    S9  Getting dressed?  None =         1    S10  Dealing with people you do not know?  Moderate =   3    S11  Maintaining a friendship?  Severe =       4    S12  Your day to day work?  Moderate =   3       H1  Overall, in the past 30 days, how many days were these difficulties present?  Record number of days seven    H2  In the past 30 days, for how many days were you totally unable to carry out your usual activities or work because of any health condition?  Record number of days  seven    H3  In the past 30 days, not counting the days that you were totally unable, for how many days did you cut back or reduce your usual activities or work because of any health condition?  Record number of days five                    Referral / Collaboration:  Referral to another professional/service is not indicated at this time..    Anticipated number of session or this episode of care: 15    Goals  1. Education- the Biopsychosocial model of depression  a. Client will be able to describe how depression is effecting them physically, emotionally and socially  2. Behavioral Activation  a. Client will learn to assess their depression on a day to day basis  b. Client will Identify two forms of exercise/activity and engage in them 3 times per week  c. Client will Identify 3 things that make them laugh, and engage in these 5 times per week.  d. Client will Identify 1-2Creative activities or hobbies  and engage in them 2 times per week  e. Client will identify music, movies, books that make them feel good and use them 3-4 times per week  3. Self-care  a. Client will identify 5 things they can do just for themselves  b. Client will take time for quiet, reflection, meditation 5 times per week  c. Client will Learn to set boundaries when appropriate  d. Client will Identify 2 individuals they can call on for support, distraction  4. Assessment of progress  a. Client will engage in assessment of their progress on a regular basis    Bipolar Disorder  Treatment plan:  1. Education- the Biopsychosocial model of Bipolar Disorder    a. Client will be able to describe in general terms what Bipolar Disorder  is and is not  b. Client will be able to describe how Bipolar Disorder  has affected their life in at least two different areas, such as school or work and Home/relationships  c. Clients parents/guardians or significant others will be provided information on what Bipolar Disorder  is and the ways it can affect relationships and be encouraged to be a part of clients treatment team.  2. Medication  a. Client will participate in medication evaluation for Bipolar Disorder  symptoms and follow medication recommendations.   3. Identification and management  of triggers  a. Client and therapist will examine patient s history to determine if there are predictable triggers for manic or depressive episodes (e.g. boredom, anger, family stress)  b. Client and therapist will develop means of diffusing these triggers (e.g. relaxation strategies, boundary setting, anger management)  4. Comorbid conditions   a. Client and therapist will asses for comorbid conditions (e.g. anxiety, depression, substance use). and add additional items to treatment plan as needed  5. Self-care  a. Client will identify 3 things they can do just for themselves  b. Client will take time for quiet, reflection, meditation 3 times per week  c. Client will Learn to set boundaries when appropriate  d. Client will Identify 2 individuals they can call on for support, distraction  5. Behavioral Activation  a. Client will Identify two forms of exercise/activity and engage in them 3 times per week  b. Client will Identify 2 things that make them laugh, and engage in these 3 times per week.  c. Client will Identify 2 Creative activities or hobbies  and engage in them 2 times per week  d. Client will identify music, movies, books that make them feel good and use them 3 times per week  6. Assessment of progress  a. Client will engage in assessment of their progress on a regular basis    Client has reviewed and agreed to the above plan.      Kd Morris  4/3/2018

## 2019-08-31 ASSESSMENT — ANXIETY QUESTIONNAIRES: GAD7 TOTAL SCORE: 7

## 2019-09-04 ENCOUNTER — INFUSION THERAPY VISIT (OUTPATIENT)
Dept: INFUSION THERAPY | Facility: CLINIC | Age: 54
End: 2019-09-04
Attending: INTERNAL MEDICINE
Payer: COMMERCIAL

## 2019-09-04 ENCOUNTER — HOSPITAL ENCOUNTER (OUTPATIENT)
Dept: LAB | Facility: CLINIC | Age: 54
Discharge: HOME | End: 2019-09-04
Attending: INTERNAL MEDICINE | Admitting: FAMILY MEDICINE
Payer: COMMERCIAL

## 2019-09-04 ENCOUNTER — ANTICOAGULATION THERAPY VISIT (OUTPATIENT)
Dept: ANTICOAGULATION | Facility: CLINIC | Age: 54
End: 2019-09-04
Payer: COMMERCIAL

## 2019-09-04 VITALS — SYSTOLIC BLOOD PRESSURE: 117 MMHG | HEART RATE: 72 BPM | DIASTOLIC BLOOD PRESSURE: 46 MMHG

## 2019-09-04 DIAGNOSIS — I63.9 STROKE (H): ICD-10-CM

## 2019-09-04 DIAGNOSIS — Z86.73 HISTORY OF STROKE: ICD-10-CM

## 2019-09-04 DIAGNOSIS — I73.9 PVD (PERIPHERAL VASCULAR DISEASE) WITH CLAUDICATION (H): ICD-10-CM

## 2019-09-04 DIAGNOSIS — Z79.01 LONG TERM CURRENT USE OF ANTICOAGULANT THERAPY: ICD-10-CM

## 2019-09-04 DIAGNOSIS — I82.409 DVT (DEEP VENOUS THROMBOSIS) (H): ICD-10-CM

## 2019-09-04 LAB
HCT VFR BLD AUTO: 53.9 % (ref 35–47)
INR PPP: 2.28 (ref 0.86–1.14)

## 2019-09-04 PROCEDURE — 85014 HEMATOCRIT: CPT | Performed by: FAMILY MEDICINE

## 2019-09-04 PROCEDURE — 36415 COLL VENOUS BLD VENIPUNCTURE: CPT | Performed by: FAMILY MEDICINE

## 2019-09-04 PROCEDURE — 99207 ZZC NO CHARGE NURSE ONLY: CPT

## 2019-09-04 PROCEDURE — 99195 PHLEBOTOMY: CPT

## 2019-09-04 PROCEDURE — 85610 PROTHROMBIN TIME: CPT | Performed by: FAMILY MEDICINE

## 2019-09-04 NOTE — PROGRESS NOTES
ANTICOAGULATION FOLLOW-UP CLINIC VISIT    Patient Name:  Bia Bill  Date:  2019  Contact Type:  Telephone/ detailed message left    SUBJECTIVE:  Patient Findings     Comments:   Writer left detailed message on patient's phone, per demo page okay to leave a detailed message. Asked patient to call [facility] back if any changes, concerns, missed doses, etc. Will recheck INR on 19 when at [facility] to see Dr. Morris.        Clinical Outcomes     Comments:   Writer left detailed message on patient's phone, per demo page okay to leave a detailed message. Asked patient to call [facility] back if any changes, concerns, missed doses, etc. Will recheck INR on 19 when at [facility] to see Dr. Morris.           OBJECTIVE    INR   Date Value Ref Range Status   2019 2.28 (H) 0.86 - 1.14 Final       ASSESSMENT / PLAN  INR assessment THER    Recheck INR In: 4 WEEKS    INR Location Clinic INR at lab     Anticoagulation Summary  As of 2019    INR goal:   2.0-2.5   TTR:   46.8 % (4.4 y)   INR used for dosin.28 (2019)   Warfarin maintenance plan:   10 mg (5 mg x 2) every day   Full warfarin instructions:   10 mg every day   Weekly warfarin total:   70 mg   No change documented:   Mireille Torres RN   Plan last modified:   Reina Forrest RN (2019)   Next INR check:   2019   Priority:   INR   Target end date:   Indefinite    Indications    PVD (peripheral vascular disease) with claudication (H) [I73.9]  Long term current use of anticoagulant therapy (Resolved) [Z79.01]  DVT (deep venous thrombosis) (H) [I82.409]  Stroke (H) [I63.9]             Anticoagulation Episode Summary     INR check location:       Preferred lab:       Send INR reminders to:   [facility] PO[facility]    Comments:   * lab draw due to elevated hematocrit (fingerstick meters don't work). LEFT LE. STENT x 3 LEFT UPPER LEG. Previous arterial clot. New goal range 2-2.5 starting 17.      Anticoagulation Care Providers      Provider Role Specialty Phone number    Kd Leong MD [facility] [phone]            See the Encounter Report to view Anticoagulation Flowsheet and Dosing Calendar (Go to Encounters tab in chart review, and find the Anticoagulation Therapy Visit)        Mireille Torres RN

## 2019-09-10 DIAGNOSIS — E03.9 HYPOTHYROIDISM: Primary | Chronic | ICD-10-CM

## 2019-09-10 DIAGNOSIS — E78.00 HYPERCHOLESTEREMIA: ICD-10-CM

## 2019-09-10 DIAGNOSIS — I89.0 LYMPHEDEMA OF BOTH LOWER EXTREMITIES: ICD-10-CM

## 2019-09-10 NOTE — TELEPHONE ENCOUNTER
SIMVASTATIN 20MG TABS      Last Written Prescription Date:  6/13/19  Last Fill Quantity: 90,   # refills: 0  Last Office Visit: 6/19/19  Future Office visit:    Next 5 appointments (look out 90 days)    Sep 13, 2019  1:00 PM CDT  Return Visit with Kd Morris  [facility] ([facility]) [address] [facility] [facility] [address]  [phone]   Sep 18, 2019 11:00 AM CDT  Return Visit with Joshua Watts MD  [facility] ([facility]) [facility]  [address] [place] [address]  [phone]   Sep 27, 2019 12:00 PM CDT  Return Visit with Kd Morris  [facility] ([facility]) [address] [facility] [address]  [phone]           VITAMIN D3 1000 units tablet      Last Written Prescription Date:  5/22/19  Last Fill Quantity: 100,   # refills: 0  Last Office Visit: 6/19/19  Future Office visit:    Next 5 appointments (look out 90 days)    Sep 13, 2019  1:00 PM CDT  Return Visit with Kd Morris  [facility] ([facility]) [address] [place] [id number]  [phone]   Sep 18, 2019 11:00 AM CDT  Return Visit with Joshua Watts MD  [facility] ([facility]) [facility]  [address] [place] [address] [address]  [facility] [address]  [phone]   Sep 27, 2019 12:00 PM CDT  Return Visit with Kd CHAWLA [facility] ([facility]) [address] [place] [address]  [phone]           Requested Prescriptions   Pending Prescriptions Disp Refills     simvastatin (ZOCOR) 20 MG tablet [Pharmacy Med Name: SIMVASTATIN 20MG TABS] 90 tablet 0     Sig: TAKE ONE TABLET BY MOUTH EVERY NIGHT AT BEDTIME, OVERDUE FOR FASTING LABS       Statins Protocol Failed - 9/10/2019 10:14 AM        Failed - LDL on file in past 12 months     Recent Labs   Lab Test 04/06/18  1316   *  "            Passed - No abnormal creatine kinase in past 12 months     Recent Labs   Lab Test 11/30/15  1508                   Passed - Recent (12 mo) or future (30 days) visit within the authorizing provider's specialty     Patient had office visit in the last 12 months or has a visit in the next 30 days with authorizing provider or within the authorizing provider's specialty.  See \"Patient Info\" tab in inbasket, or \"Choose Columns\" in Meds & Orders section of the refill encounter.              Passed - Medication is active on med list        Passed - Patient is age 18 or older        Passed - No active pregnancy on record        Passed - No positive pregnancy test in past 12 months        VITAMIN D3 1000 units tablet [Pharmacy Med Name: VITAMIN D3 1000UNIT TABS] 100 tablet 0     Sig: TAKE ONE TABLET BY MOUTH ONCE DAILY       Vitamin Supplements (Adult) Protocol Passed - 9/10/2019 10:14 AM        Passed - High dose Vitamin D not ordered        Passed - Recent (12 mo) or future (30 days) visit within the authorizing provider's specialty     Patient had office visit in the last 12 months or has a visit in the next 30 days with authorizing provider or within the authorizing provider's specialty.  See \"Patient Info\" tab in inbasket, or \"Choose Columns\" in Meds & Orders section of the refill encounter.              Passed - Medication is active on med list          "

## 2019-09-11 ENCOUNTER — INFUSION THERAPY VISIT (OUTPATIENT)
Dept: INFUSION THERAPY | Facility: CLINIC | Age: 54
End: 2019-09-11
Attending: INTERNAL MEDICINE
Payer: COMMERCIAL

## 2019-09-11 ENCOUNTER — HOSPITAL ENCOUNTER (OUTPATIENT)
Dept: LAB | Facility: CLINIC | Age: 54
Discharge: HOME | End: 2019-09-11
Attending: INTERNAL MEDICINE | Admitting: INTERNAL MEDICINE
Payer: COMMERCIAL

## 2019-09-11 VITALS — SYSTOLIC BLOOD PRESSURE: 104 MMHG | HEART RATE: 74 BPM | DIASTOLIC BLOOD PRESSURE: 46 MMHG

## 2019-09-11 DIAGNOSIS — D75.1 POLYCYTHEMIA, SECONDARY: ICD-10-CM

## 2019-09-11 DIAGNOSIS — Z86.73 HISTORY OF STROKE: ICD-10-CM

## 2019-09-11 DIAGNOSIS — I82.4Y9 DEEP VEIN THROMBOSIS (DVT) OF PROXIMAL LOWER EXTREMITY, UNSPECIFIED CHRONICITY, UNSPECIFIED LATERALITY (H): Primary | Chronic | ICD-10-CM

## 2019-09-11 LAB
BASOPHILS # BLD AUTO: 0 10E9/L (ref 0–0.2)
BASOPHILS NFR BLD AUTO: 0.4 %
DIFFERENTIAL METHOD BLD: ABNORMAL
EOSINOPHIL # BLD AUTO: 0.1 10E9/L (ref 0–0.7)
EOSINOPHIL NFR BLD AUTO: 1.2 %
ERYTHROCYTE [DISTWIDTH] IN BLOOD BY AUTOMATED COUNT: 18.4 % (ref 10–15)
HCT VFR BLD AUTO: 51.6 % (ref 35–47)
HGB BLD-MCNC: 15.8 G/DL (ref 11.7–15.7)
IMM GRANULOCYTES # BLD: 0 10E9/L (ref 0–0.4)
IMM GRANULOCYTES NFR BLD: 0.6 %
INR PPP: 2.89 (ref 0.86–1.14)
LYMPHOCYTES # BLD AUTO: 1.5 10E9/L (ref 0.8–5.3)
LYMPHOCYTES NFR BLD AUTO: 29 %
MCH RBC QN AUTO: 28.6 PG (ref 26.5–33)
MCHC RBC AUTO-ENTMCNC: 30.6 G/DL (ref 31.5–36.5)
MCV RBC AUTO: 94 FL (ref 78–100)
MONOCYTES # BLD AUTO: 0.4 10E9/L (ref 0–1.3)
MONOCYTES NFR BLD AUTO: 7.4 %
NEUTROPHILS # BLD AUTO: 3.1 10E9/L (ref 1.6–8.3)
NEUTROPHILS NFR BLD AUTO: 61.4 %
NRBC # BLD AUTO: 0 10*3/UL
NRBC BLD AUTO-RTO: 0 /100
PLATELET # BLD AUTO: 127 10E9/L (ref 150–450)
RBC # BLD AUTO: 5.52 10E12/L (ref 3.8–5.2)
WBC # BLD AUTO: 5 10E9/L (ref 4–11)

## 2019-09-11 PROCEDURE — 99195 PHLEBOTOMY: CPT

## 2019-09-11 PROCEDURE — 36415 COLL VENOUS BLD VENIPUNCTURE: CPT | Performed by: INTERNAL MEDICINE

## 2019-09-11 PROCEDURE — 85610 PROTHROMBIN TIME: CPT | Performed by: INTERNAL MEDICINE

## 2019-09-11 PROCEDURE — 85025 COMPLETE CBC W/AUTO DIFF WBC: CPT | Performed by: INTERNAL MEDICINE

## 2019-09-11 RX ORDER — CHOLECALCIFEROL (VITAMIN D3) 25 MCG
TABLET ORAL
Qty: 100 TABLET | Refills: 2 | Status: SHIPPED | OUTPATIENT
Start: 2019-09-11 | End: 2020-07-10

## 2019-09-11 RX ORDER — SIMVASTATIN 20 MG
20 TABLET ORAL AT BEDTIME
Qty: 30 TABLET | Refills: 0 | Status: SHIPPED | OUTPATIENT
Start: 2019-09-11 | End: 2019-10-11

## 2019-09-11 NOTE — TELEPHONE ENCOUNTER
Medication is being filled for 1 time refill only due to:  Patient needs labs lipids,thyroid. Future labs ordered yes.   Mundo Conde RN

## 2019-09-13 ENCOUNTER — TELEPHONE (OUTPATIENT)
Dept: ANTICOAGULATION | Facility: CLINIC | Age: 54
End: 2019-09-13

## 2019-09-13 ENCOUNTER — OFFICE VISIT (OUTPATIENT)
Dept: PSYCHOLOGY | Facility: CLINIC | Age: 54
End: 2019-09-13
Payer: COMMERCIAL

## 2019-09-13 DIAGNOSIS — I82.409 DEEP VEIN THROMBOSIS (DVT) OF LOWER EXTREMITY, UNSPECIFIED CHRONICITY, UNSPECIFIED LATERALITY, UNSPECIFIED VEIN (H): Primary | Chronic | ICD-10-CM

## 2019-09-13 DIAGNOSIS — F31.62 MODERATE MIXED BIPOLAR I DISORDER (H): Primary | ICD-10-CM

## 2019-09-13 PROCEDURE — 90834 PSYTX W PT 45 MINUTES: CPT | Performed by: PSYCHOLOGIST

## 2019-09-13 NOTE — TELEPHONE ENCOUNTER
Please review this patient's chart. She was placed on a 2-2.5 INR goal range in Nov of 2017 due to rectal bleeding.     She has not had any further issues with bleeding and our anticoagulation clinic can no longer manage goal ranges outside of 2-3 or 2.5-3.5 per protocol.     The patient is requesting to return to a 2-3 range as well. Please advise if we can now resume this goal range? If not, INR management will need to be done by primary provider.    Thank you,  Mireille Torres, RN, BSN, PHN  Pool [phone]

## 2019-09-13 NOTE — PROGRESS NOTES
Outpatient Physical Therapy Discharge Note     Patient: Bia Bill  : 1965    Beginning/End Dates of Reporting Period:  2019 to 2019    Referring Provider: Dr Leong     Therapy Diagnosis: R LBP. 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     Client Self Report: Pt relates back is not doing better. Pt relates pain increases when walking more than 50 feet.  Pt relates today is a bad day as she had to walk 3 miles yesterday.  Pt relates pain does go down to the knee but not there today.     Objective Measurements:  Objective Measure: Trunk ROM     Objective Measure: Pelvis  Details: neutral pelvis   Objective Measure: Palpation  Details: very tender R piriformis, gluts  Objective Measure: Lumbar ROM   Details: flex: 16 in from floor w pain, ext: 100% w min shift to R, R: sidebend: 100% L side bend: 100% w stretching           Goals:  Goal Identifier 1   Goal Description Pt will be able to sleep through the night without waking with pain.(waking up 3x/night)   Target Date 19   Date Met  19   Progress:     Goal Identifier 2   Goal Description Pt will be able to amb with a SEC with < 4/10 pain.   Target Date 19   Date Met      Progress:     Goal Identifier 3   Goal Description Pt will be able to amb on stairs with < 4/10 pain.(step to pattern, okay if slow)   Target Date 19   Date Met      Progress:     Goal Identifier 4   Goal Description Pt will be independent with HEP for optimal functional recovery.   Target Date 19   Date Met      Progress:         Progress Toward Goals:   Progress this reporting period: Pt has not made significant progress toward her goals. Treatment has consisted of LB ROM, stretching and strengthening exercises and MET correction to R on R sacral torsion. Pt has not returned to PT so current status is unknown.          Plan:  Discharge from therapy.    Discharge:    Reason for Discharge: Patient has failed to schedule further  appointments.      Discharge Plan: Patient to continue home program.    Goldie Condon PT

## 2019-09-13 NOTE — ADDENDUM NOTE
Encounter addended by: Goldie Condon, PT on: 9/13/2019 2:14 PM   Actions taken: Sign clinical note, Episode resolved

## 2019-09-16 NOTE — TELEPHONE ENCOUNTER
I will look into it.  Who can I talk to about why I think this should be included in the protocol?

## 2019-09-16 NOTE — TELEPHONE ENCOUNTER
I will route it to our anticoagulation clinical pharmacist and I'm sure she will respond.    Thanks,  Mireille Torres, RN, BSN, PHN

## 2019-09-17 ASSESSMENT — ANXIETY QUESTIONNAIRES
1. FEELING NERVOUS, ANXIOUS, OR ON EDGE: MORE THAN HALF THE DAYS
3. WORRYING TOO MUCH ABOUT DIFFERENT THINGS: SEVERAL DAYS
5. BEING SO RESTLESS THAT IT IS HARD TO SIT STILL: SEVERAL DAYS
4. TROUBLE RELAXING: SEVERAL DAYS
2. NOT BEING ABLE TO STOP OR CONTROL WORRYING: SEVERAL DAYS
GAD7 TOTAL SCORE: 9
6. BECOMING EASILY ANNOYED OR IRRITABLE: MORE THAN HALF THE DAYS
7. FEELING AFRAID AS IF SOMETHING AWFUL MIGHT HAPPEN: SEVERAL DAYS

## 2019-09-17 ASSESSMENT — PATIENT HEALTH QUESTIONNAIRE - PHQ9: SUM OF ALL RESPONSES TO PHQ QUESTIONS 1-9: 10

## 2019-09-17 NOTE — PROGRESS NOTES
Progress Note  Disclaimer: This note consists of symbols derived from keyboarding, dictation and/or voice recognition software. As a result, there may be errors in the script that have gone undetected. Please consider this when interpreting information found in this chart.    Client Name: Bia Bill  Date: 9/13/2019         Service Type: Individual      Session Start Time: 1:00 PM   session End Time 1:45 PM   session Length: 45     Session #: 55     Attendees: Client attended alone    Treatment Plan Last Reviewed: 4/3/2019  PHQ-9 / CHIN-7 : See flowsheet     DATA  Client reports she is continuing to enforce her boundaries with family.  We spent some time discussing our last session which included her daughter.  Client related that her daughter insisted on coming and the client felt her daughter attempted to hygienic therapy for her own benefit.  I concurred with the client's assessment.  This stresses even more clearly the need for the client to maintain effective boundaries with family.   Progress Since Last Session (Related to Symptoms / Goals / Homework):   Symptoms: Stable    Homework: Achieved / completed to satisfaction      Episode of Care Goals: Satisfactory progress - ACTION (Actively working towards change); Intervened by reinforcing change plan / affirming steps taken     Current / Ongoing Stressors and Concerns:   Recurrent depression  family relational problems, medical problems, chronic pain, trauma history, recent death of longtime friend.     Treatment Objective(s) Addressed in This Session:   Increase interest, engagement, and pleasure in doing things  Decrease frequency and intensity of feeling down, depressed, hopeless       Intervention:   CBT: Behavioral activation supported client's effort at establishing maintaining healthy boundaries with her family.  Reinforced client's care for her grandchildren     ASSESSMENT: Current Emotional / Mental  Status (status of significant symptoms):   Risk status (Self / Other harm or suicidal ideation)   Client denies current fears or concerns for personal safety.   Client denies current or recent suicidal ideation or behaviors.   Client denies current or recent homicidal ideation or behaviors.   Client denies current or recent self injurious behavior or ideation.   Client denies other safety concerns.   A safety and risk management plan has not been developed at this time, however client was given the after-hours number / 911 should there be a change in any of these risk factors.     Appearance:   Appropriate    Eye Contact:   Good    Psychomotor Behavior: Normal    Attitude:   Cooperative    Orientation:   All   Speech    Rate / Production: Normal     Volume:  Normal    Mood:    Normal   Affect:    Appropriate    Thought Content:  Clear    Thought Form:  Coherent  Logical    Insight:    Good      Medication Review:   No changes to current psychiatric medication(s)     Medication Compliance:   Yes     Changes in Health Issues:   None reported     Chemical Use Review:   Substance Use: Chemical use reviewed, no active concerns identified      Tobacco Use: Client reports she is down to 1 cigarette per day from 2 packs/day.   Collateral Reports Completed:   Not Applicable    PLAN: (Client Tasks / Therapist Tasks / Other)  Client to continue With her self-care routine and maintenance of boundaries.    Practice one to 2 grounding techniques each day.   Client to maintain sobriety and contact with supportive sober others.    Kd Morris                                                         ________________________________________________________________________    Treatment Plan    Client's Name: Bia Bill  YOB: 1965    Date: 10/24/2017      DSM5 Diagnoses: (Sustained by DSM5 Criteria Listed Above)  Diagnoses: 296.40 Bipolar I Disorder Current or Most Recent Episode Manic,  Unspecified  Psychosocial & Contextual Factors: financial difficulties, chronic pain, roommate issues  WHODAS 2.0 (12 item)               This questionnaire asks about difficulties due to health conditions. Health conditions             include disease or illnesses, other health problems that may be short or long lasting,             injuries, mental health or emotional problems, and problems with alcohol or drugs.                         Think back over the past 30 days and answer these questions, thinking about how much             difficulty you had doing the following activities. For each question, please [place] only             one response.      S1  Standing for long periods such as 30 minutes?  Mild =           2    S2  Taking care of household responsibilities?  Moderate =   3    S3  Learning a new task, for example, learning how to get to a new place?  Mild =           2    S4  How much of a problem do you have joining community activities (for example, festivals, Buddhism or other activities) in the same way as anyone else can?  Moderate =   3    S5  How much have you been emotionally affected by your health problems?  Mild =           2        In the past 30 days, how much difficulty did you have in:    S6  Concentrating on doing something for ten minutes?  Mild =           2    S7  Walking a long distance such as a kilometer (or equivalent)?  Severe =       4    S8  Washing your whole body?  None =         1    S9  Getting dressed?  None =         1    S10  Dealing with people you do not know?  Moderate =   3    S11  Maintaining a friendship?  Severe =       4    S12  Your day to day work?  Moderate =   3       H1  Overall, in the past 30 days, how many days were these difficulties present?  Record number of days seven    H2  In the past 30 days, for how many days were you totally unable to carry out your usual activities or work because of any health condition?  Record number of days  seven    H3  In  the past 30 days, not counting the days that you were totally unable, for how many days did you cut back or reduce your usual activities or work because of any health condition?  Record number of days five                   Referral / Collaboration:  Referral to another professional/service is not indicated at this time..    Anticipated number of session or this episode of care: 15    Goals  1. Education- the Biopsychosocial model of depression  a. Client will be able to describe how depression is effecting them physically, emotionally and socially  2. Behavioral Activation  a. Client will learn to assess their depression on a day to day basis  b. Client will Identify two forms of exercise/activity and engage in them 3 times per week  c. Client will Identify 3 things that make them laugh, and engage in these 5 times per week.  d. Client will Identify 1-2Creative activities or hobbies  and engage in them 2 times per week  e. Client will identify music, movies, books that make them feel good and use them 3-4 times per week  3. Self-care  a. Client will identify 5 things they can do just for themselves  b. Client will take time for quiet, reflection, meditation 5 times per week  c. Client will Learn to set boundaries when appropriate  d. Client will Identify 2 individuals they can call on for support, distraction  4. Assessment of progress  a. Client will engage in assessment of their progress on a regular basis    Bipolar Disorder  Treatment plan:  1. Education- the Biopsychosocial model of Bipolar Disorder    a. Client will be able to describe in general terms what Bipolar Disorder  is and is not  b. Client will be able to describe how Bipolar Disorder  has affected their life in at least two different areas, such as school or work and Home/relationships  c. Clients parents/guardians or significant others will be provided information on what Bipolar Disorder  is and the ways it can affect relationships and be  encouraged to be a part of clients treatment team.  2. Medication  a. Client will participate in medication evaluation for Bipolar Disorder  symptoms and follow medication recommendations.   3. Identification and management of triggers  a. Client and therapist will examine patient s history to determine if there are predictable triggers for manic or depressive episodes (e.g. boredom, anger, family stress)  b. Client and therapist will develop means of diffusing these triggers (e.g. relaxation strategies, boundary setting, anger management)  4. Comorbid conditions   a. Client and therapist will asses for comorbid conditions (e.g. anxiety, depression, substance use). and add additional items to treatment plan as needed  5. Self-care  a. Client will identify 3 things they can do just for themselves  b. Client will take time for quiet, reflection, meditation 3 times per week  c. Client will Learn to set boundaries when appropriate  d. Client will Identify 2 individuals they can call on for support, distraction  5. Behavioral Activation  a. Client will Identify two forms of exercise/activity and engage in them 3 times per week  b. Client will Identify 2 things that make them laugh, and engage in these 3 times per week.  c. Client will Identify 2 Creative activities or hobbies  and engage in them 2 times per week  d. Client will identify music, movies, books that make them feel good and use them 3 times per week  6. Assessment of progress  a. Client will engage in assessment of their progress on a regular basis    Client has reviewed and agreed to the above plan.      Kd Morris  4/3/2018

## 2019-09-18 ENCOUNTER — HOSPITAL ENCOUNTER (OUTPATIENT)
Dept: LAB | Facility: CLINIC | Age: 54
Discharge: HOME | End: 2019-09-18
Attending: INTERNAL MEDICINE | Admitting: INTERNAL MEDICINE
Payer: COMMERCIAL

## 2019-09-18 ENCOUNTER — ONCOLOGY VISIT (OUTPATIENT)
Dept: ONCOLOGY | Facility: CLINIC | Age: 54
End: 2019-09-18
Attending: INTERNAL MEDICINE
Payer: COMMERCIAL

## 2019-09-18 VITALS
TEMPERATURE: 97.9 F | HEIGHT: 58 IN | OXYGEN SATURATION: 94 % | DIASTOLIC BLOOD PRESSURE: 50 MMHG | HEART RATE: 71 BPM | SYSTOLIC BLOOD PRESSURE: 124 MMHG | BODY MASS INDEX: 49.9 KG/M2 | WEIGHT: 237.7 LBS | RESPIRATION RATE: 18 BRPM

## 2019-09-18 DIAGNOSIS — J44.9 CHRONIC OBSTRUCTIVE PULMONARY DISEASE, UNSPECIFIED COPD TYPE (H): Chronic | ICD-10-CM

## 2019-09-18 DIAGNOSIS — I82.4Y9 DEEP VEIN THROMBOSIS (DVT) OF PROXIMAL LOWER EXTREMITY, UNSPECIFIED CHRONICITY, UNSPECIFIED LATERALITY (H): Chronic | ICD-10-CM

## 2019-09-18 DIAGNOSIS — I10 HTN, GOAL BELOW 140/90: ICD-10-CM

## 2019-09-18 DIAGNOSIS — D75.1 ERYTHROCYTOSIS: Primary | ICD-10-CM

## 2019-09-18 DIAGNOSIS — F33.2 SEVERE EPISODE OF RECURRENT MAJOR DEPRESSIVE DISORDER, WITHOUT PSYCHOTIC FEATURES (H): Chronic | ICD-10-CM

## 2019-09-18 LAB
BASOPHILS # BLD AUTO: 0 10E9/L (ref 0–0.2)
BASOPHILS NFR BLD AUTO: 0.6 %
DIFFERENTIAL METHOD BLD: ABNORMAL
EOSINOPHIL # BLD AUTO: 0.1 10E9/L (ref 0–0.7)
EOSINOPHIL NFR BLD AUTO: 1.3 %
ERYTHROCYTE [DISTWIDTH] IN BLOOD BY AUTOMATED COUNT: 18.6 % (ref 10–15)
HCT VFR BLD AUTO: 50.3 % (ref 35–47)
HGB BLD-MCNC: 14.8 G/DL (ref 11.7–15.7)
IMM GRANULOCYTES # BLD: 0 10E9/L (ref 0–0.4)
IMM GRANULOCYTES NFR BLD: 0.2 %
LYMPHOCYTES # BLD AUTO: 1.1 10E9/L (ref 0.8–5.3)
LYMPHOCYTES NFR BLD AUTO: 24.2 %
MCH RBC QN AUTO: 28.1 PG (ref 26.5–33)
MCHC RBC AUTO-ENTMCNC: 29.4 G/DL (ref 31.5–36.5)
MCV RBC AUTO: 95 FL (ref 78–100)
MONOCYTES # BLD AUTO: 0.3 10E9/L (ref 0–1.3)
MONOCYTES NFR BLD AUTO: 5.4 %
NEUTROPHILS # BLD AUTO: 3.2 10E9/L (ref 1.6–8.3)
NEUTROPHILS NFR BLD AUTO: 68.3 %
NRBC # BLD AUTO: 0 10*3/UL
NRBC BLD AUTO-RTO: 0 /100
PLATELET # BLD AUTO: 141 10E9/L (ref 150–450)
RBC # BLD AUTO: 5.27 10E12/L (ref 3.8–5.2)
WBC # BLD AUTO: 4.6 10E9/L (ref 4–11)

## 2019-09-18 PROCEDURE — 36415 COLL VENOUS BLD VENIPUNCTURE: CPT | Performed by: INTERNAL MEDICINE

## 2019-09-18 PROCEDURE — 99214 OFFICE O/P EST MOD 30 MIN: CPT | Performed by: INTERNAL MEDICINE

## 2019-09-18 PROCEDURE — G0463 HOSPITAL OUTPT CLINIC VISIT: HCPCS

## 2019-09-18 PROCEDURE — 85025 COMPLETE CBC W/AUTO DIFF WBC: CPT | Performed by: INTERNAL MEDICINE

## 2019-09-18 ASSESSMENT — ANXIETY QUESTIONNAIRES: GAD7 TOTAL SCORE: 9

## 2019-09-18 ASSESSMENT — PAIN SCALES - GENERAL: PAINLEVEL: EXTREME PAIN (8)

## 2019-09-18 ASSESSMENT — MIFFLIN-ST. JEOR: SCORE: 1577.82

## 2019-09-18 NOTE — LETTER
"    9/18/2019         RE: Bia Bill  [address]  [facility] [address]        Dear Colleague,    Thank you for referring your patient, Bia Bill, to the [facility]. Please see a copy of my visit note below.    Oncology Rooming Note    September 18, 2019 10:47 AM   Bia Bill is a 53 year old female who presents for:    Chief Complaint   Patient presents with     Oncology Clinic Visit     6 month recheck Erythrocytosis, SECONDARY POLYCYTHEMIA , review labs      Initial Vitals: /50 (BP Location: Right arm, Patient Position: Sitting, Cuff Size: Adult Large)   Pulse 71   Temp 97.9  F (36.6  C) (Tympanic)   Resp 18   Ht 1.473 m (4' 9.99\")   Wt 107.8 kg (237 lb 11.2 oz)   LMP 09/27/2009   SpO2 94%   Breastfeeding? No   BMI 49.69 kg/m    Estimated body mass index is 49.69 kg/m  as calculated from the following:    Height as of this encounter: 1.473 m (4' 9.99\").    Weight as of this encounter: 107.8 kg (237 lb 11.2 oz). Body surface area is 2.1 meters squared.  Extreme Pain (8) Comment: Data Unavailable   Patient's last menstrual period was 09/27/2009.  Allergies reviewed: Yes  Medications reviewed: Yes    Medications: Medication refills not needed today.  Pharmacy name entered into [employer]:    [place], [place] - [address] [id number] [place], [place] - [address] [facility], [place] - [address] [place] [address]    Clinical concerns: 6 month recheck Erythrocytosis, SECONDARY POLYCYTHEMIA , review labs.     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 She was previously seen by Dr. Lomax on 10/09/2011, but has been lost from followup. The patient has had erythrocytosis with a hemoglobin around 20-23 and hemoglobin and hematocrit of 60-65% for most of her life. She had a previous workup by Dr. Lomax and also by another hematologist at [facility]. Review of her previous tests and workup showed a hemoglobin electrophoresis on 01/17/2007 that showed a hemoglobin A1 of 52%, A2 at 0, hemoglobin F at 1.4% and other hemoglobin 46.2%. JAK2 was negative on 07/31/2007. Hemoglobin affinity testing on 07/31/2007 showed a P50 of 27 (normal between 25 and 29). Repeat hemoglobin electrophoresis on 10/04/2011 showed the hemoglobin was at 39.9%. This illuminated hemoglobin was suggestive of beta globin variant of hemoglobin Hb [place]. She was also seen by Dr. Peres in 2007 and most of the workup is from there. Erythropoietin level was in the range of the 80s. Vitamin B12 and folic acid were within normal limits. Abdomen ultrasound did not show hepatosplenomegaly, but there is fatty infiltration of the liver. According to the chart, the patient had phlebotomies done in the past to keep her hemoglobin below 20 g/dl. This was done over the summertime of 2011. Most recently she didn't have any phlebotomy. She was started on anticoagulation with Coumadin as she was felt to be at high risk for blood clots and given history of severe arterial disease and stroke in the past. The patient is safe to use her anticoagulation and her most recent INR was 1.74. Evidently her carbon monoxide is also elevated. She is an active smoker, but she is cutting down from 2 packs per day to 3 cigarettes a day over the last one month. Patient denies any DVT or PE. She also  evidently claims that her mother and her first son are also having the same hemoglobinopathy and are being followed by their physicians. Bone marrow biopsy on 11/17/2014 showed no clonal chromosomal abnormality and no cytogenetic evidence of a malignant process. She also tested negative for the JAK2 mutation. Leukemia lymphoma evaluation additionally showed no aberrant immunophenotype on T cells and no increase in or abnormal appearing myeloid blasts.       Interval History:  Patient returning today for follow-up visit.  She concluded for treatments with phlebotomy over the last month.  She continues to feel well without any new symptoms of shortness of breath or cough or wheezing.  She denies any chest pain.  She denies any dizzy spells.    Review Of Systems:  Constitutional: Negative for fever, chills, and night sweats.  Skin: negative.  Eyes: negative.  Ears/Nose/Throat: negative.  Respiratory: No shortness of breath, dyspnea on exertion, cough, or hemoptysis.  Cardiovascular: negative.  Gastrointestinal: negative.  Genitourinary: negative.  Musculoskeletal: negative.  Neurologic: negative.  Psychiatric: negative.  Hematologic/Lymphatic/Immunologic: negative.  Endocrine: negative.    All other ROS negative unless mentioned in interval history.    Past medical, social, surgical, and family histories reviewed.    Allergies:  Allergies as of 09/18/2019 - Reviewed 09/18/2019   Allergen Reaction Noted     Darvocet [propoxyphene n-apap] Anaphylaxis 07/26/2006     Percocet [oxycodone-acetaminophen] Anaphylaxis, Hives, and Swelling 06/01/2016     Asa [aspirin] Hives 07/10/2006     Bee  03/26/2010     Ibuprofen Swelling 03/30/2018     Lyrica [pregabalin] Other (See Comments) and Swelling 12/04/2015     Povidone iodine Rash 01/25/2017     Tape [adhesive tape] Rash 08/03/2006       Current Medications:  Current Outpatient Medications   Medication Sig Dispense Refill     acetaminophen (TYLENOL) 500 MG tablet Take 500-1,000  mg by mouth every 6 hours as needed for mild pain       albuterol (2.5 MG/3ML) 0.083% neb solution Take 1 vial (2.5 mg) by nebulization every 6 hours as needed for shortness of breath / dyspnea or wheezing 1 Box 0     albuterol (PROAIR HFA/PROVENTIL HFA/VENTOLIN HFA) 108 (90 BASE) MCG/ACT Inhaler Inhale 2 puffs into the lungs every 6 hours as needed for shortness of breath / dyspnea or wheezing 3 Inhaler 1     allopurinol (ZYLOPRIM) 300 MG tablet Take 1 tablet (300 mg) by mouth daily 90 tablet 3     ARIPiprazole (ABILIFY) 10 MG tablet TAKE ONE TABLET BY MOUTH EVERY DAY 90 tablet 1     buPROPion (WELLBUTRIN XL) 150 MG 24 hr tablet TAKE ONE TABLET BY MOUTH EVERY DAY IN THE EVENING 150 tablet 0     furosemide (LASIX) 20 MG tablet TAKE TWO TABLETS BY MOUTH TWICE DAILY 360 tablet 3     gabapentin (NEURONTIN) 300 MG capsule TAKE ONE CAPSULE BY MOUTH ONCE DAILY AT BEDTIME WITH A 600 MG TABLET FOR A TOTAL DOSE  MG 90 capsule 0     gabapentin (NEURONTIN) 600 MG tablet TAKE ONE TABLET BY MOUTH THREE TIMES A DAY 90 tablet 0     HYDROmorphone (DILAUDID) 2 MG tablet Take 0.5-1 tablets (1-2 mg) by mouth every 3 hours as needed for moderate to severe pain 10 tablet 0     ipratropium - albuterol 0.5 mg/2.5 mg/3 mL (DUONEB) 0.5-2.5 (3) MG/3ML neb solution Take 1 vial (3 mLs) by nebulization every 6 hours as needed for shortness of breath / dyspnea or wheezing 30 vial 1     lamoTRIgine (LAMICTAL) 100 MG tablet Take 1 tablet (100 mg) by mouth daily 90 tablet 3     levothyroxine (SYNTHROID/LEVOTHROID) 150 MCG tablet Take 1 tablet (150 mcg) by mouth daily 90 tablet 2     mometasone-formoterol (DULERA) 200-5 MCG/ACT oral inhaler Inhale 2 puffs into the lungs 2 times daily 13 g 1     omeprazole (PRILOSEC) 20 MG DR capsule TAKE ONE CAPSULE BY MOUTH TWICE A  capsule 1     order for DME Equipment being ordered:x2 [employer] 30/40mmHg compression wraps with x2 extra prs of compression liners 1 each 0     order for DME Equipment  "being ordered: Nebulizer 1 Device 0     order for DME Equipment being ordered: CPAP  AIRSENSE 10  5-18 CM H20  # [id number]   DN# [id number]       order for DME Equipment being ordered: [id number]\" x2pair     \"20-30mmHg [place] Hybrid Liners\" x 2 pair 2 Units 11     order for DME Equipment being ordered: DEPENDS SIZE LARGE 60 Month 5     order for DME Equipment being ordered: INCONTINENCE PADS   QID 1 Month 11     potassium chloride SA (K-DUR/KLOR-CON M) 10 MEQ CR tablet TAKE ONE TABLET BY MOUTH ONE TIME DAILY 90 tablet 3     pramipexole (MIRAPEX) 0.125 MG tablet TAKE 1-2 TABLETS BY MOUTH AT BEDTIME 60 tablet 10     simvastatin (ZOCOR) 20 MG tablet Take 1 tablet (20 mg) by mouth At Bedtime 30 tablet 0     traZODone (DESYREL) 150 MG tablet Take 1 tablet (150 mg) by mouth At Bedtime 90 tablet 1     VITAMIN D3 1000 units tablet TAKE ONE TABLET BY MOUTH ONCE DAILY 100 tablet 2     warfarin (JANTOVEN) 5 MG tablet 10 mg (5 mg x 2) every day or as directed by the Anticoagulation Clinic 170 tablet 0     EPINEPHrine (EPIPEN/ADRENACLICK/OR ANY BX GENERIC EQUIV) 0.3 MG/0.3ML injection 2-pack Inject 0.3 mLs (0.3 mg) into the muscle once as needed for anaphylaxis (Patient not taking: Reported on 9/18/2019) 0.6 mL 0        Physical Exam:  /50 (BP Location: Right arm, Patient Position: Sitting, Cuff Size: Adult Large)   Pulse 71   Temp 97.9  F (36.6  C) (Tympanic)   Resp 18   Ht 1.473 m (4' 9.99\")   Wt 107.8 kg (237 lb 11.2 oz)   LMP 09/27/2009   SpO2 94%   Breastfeeding? No   BMI 49.69 kg/m     Wt Readings from Last 12 Encounters:   09/18/19 107.8 kg (237 lb 11.2 oz)   06/19/19 106.8 kg (235 lb 8 oz)   06/12/19 109 kg (240 lb 4.8 oz)   06/07/19 104 kg (229 lb 4.5 oz)   06/06/19 107.5 kg (237 lb)   05/22/19 108.6 kg (239 lb 8 oz)   03/29/19 107.9 kg (237 lb 12.8 oz)   01/11/19 105.7 kg (233 lb)   01/03/19 105.7 kg (233 lb)   12/19/18 104.8 kg (231 lb)   12/18/18 101.2 kg (223 lb)   10/03/18 105.1 kg (231 lb 9.6 oz) "     GENERAL APPEARANCE: Healthy, alert and in no acute distress.  HEENT: Sclerae anicteric. PERRLA. Oropharynx without ulcers, lesions, or thrush.  NECK: Supple. No asymmetry or masses.  LYMPHATICS: No palpable cervical, supraclavicular, axillary, or inguinal lymphadenopathy.  RESP: Lungs clear to auscultation bilaterally without rales, rhonchi or wheezes.  CARDIOVASCULAR: Regular rate and rhythm. Normal S1, S2; no S3 or S4. No murmur, gallop, or rub.  ABDOMEN: Soft, nontender. Bowel sounds normal. No palpable organomegaly or masses.  MUSCULOSKELETAL: Extremities without gross deformities noted. No edema of bilateral lower extremities.  SKIN: No suspicious lesions or rashes.  NEURO: Alert and oriented x 3. Cranial nerves II-XII grossly intact.  PSYCHIATRIC: Mentation and affect appear normal.    Laboratory/Imaging Studies:  Infusion Therapy Visit on 09/11/2019   Component Date Value Ref Range Status     WBC 09/11/2019 5.0  4.0 - 11.0 10e9/L Final     RBC Count 09/11/2019 5.52* 3.8 - 5.2 10e12/L Final     Hemoglobin 09/11/2019 15.8* 11.7 - 15.7 g/dL Final     Hematocrit 09/11/2019 51.6* 35.0 - 47.0 % Final     MCV 09/11/2019 94  78 - 100 fl Final     MCH 09/11/2019 28.6  26.5 - 33.0 pg Final     MCHC 09/11/2019 30.6* 31.5 - 36.5 g/dL Final     RDW 09/11/2019 18.4* 10.0 - 15.0 % Final     Platelet Count 09/11/2019 127* 150 - 450 10e9/L Final     Diff Method 09/11/2019 Automated Method   Final     % Neutrophils 09/11/2019 61.4  % Final     % Lymphocytes 09/11/2019 29.0  % Final     % Monocytes 09/11/2019 7.4  % Final     % Eosinophils 09/11/2019 1.2  % Final     % Basophils 09/11/2019 0.4  % Final     % Immature Granulocytes 09/11/2019 0.6  % Final     Nucleated RBCs 09/11/2019 0  0 /100 Final     Absolute Neutrophil 09/11/2019 3.1  1.6 - 8.3 10e9/L Final     Absolute Lymphocytes 09/11/2019 1.5  0.8 - 5.3 10e9/L Final     Absolute Monocytes 09/11/2019 0.4  0.0 - 1.3 10e9/L Final     Absolute Eosinophils 09/11/2019 0.1   0.0 - 0.7 10e9/L Final     Absolute Basophils 09/11/2019 0.0  0.0 - 0.2 10e9/L Final     Abs Immature Granulocytes 09/11/2019 0.0  0 - 0.4 10e9/L Final     Absolute Nucleated RBC 09/11/2019 0.0   Final     INR 09/11/2019 2.89* 0.86 - 1.14 Final            Assessment and plan:    (D75.1) Erythrocytosis  (primary encounter diagnosis)  We will continue to monitor the patient hematocrit and offer phlebotomy if needed.  The patient currently asymptomatic.  I will see the patient again in 3 months or sooner if there are new developments or concerns.    (J44.9) Chronic obstructive pulmonary disease, unspecified COPD type (H)  Patient currently on albuterol inhaler.  She did stop smoking.    (I10) HTN, goal below 140/90  Blood pressures currently well controlled.    (F33.2) Severe episode of recurrent major depressive disorder, without psychotic features (H)  Patient currently on Wellbutrin  mg daily.    The patient is ready to learn, no apparent learning barriers were identified.  Diagnosis and treatment plans were explained to the patient. The patient expressed understanding of the content. The patient asked appropriate questions. The patient questions were answered to her satisfaction.    Chart documentation with Dragon Voice recognition Software. Although reviewed after completion, some words and grammatical errors may remain.      Again, thank you for allowing me to participate in the care of your patient.        Sincerely,        Joshua Watts MD

## 2019-09-18 NOTE — PROGRESS NOTES
"Oncology Rooming Note    September 18, 2019 10:47 AM   Bia Bill is a 53 year old female who presents for:    Chief Complaint   Patient presents with     Oncology Clinic Visit     6 month recheck Erythrocytosis, SECONDARY POLYCYTHEMIA , review labs      Initial Vitals: /50 (BP Location: Right arm, Patient Position: Sitting, Cuff Size: Adult Large)   Pulse 71   Temp 97.9  F (36.6  C) (Tympanic)   Resp 18   Ht 1.473 m (4' 9.99\")   Wt 107.8 kg (237 lb 11.2 oz)   LMP 09/27/2009   SpO2 94%   Breastfeeding? No   BMI 49.69 kg/m   Estimated body mass index is 49.69 kg/m  as calculated from the following:    Height as of this encounter: 1.473 m (4' 9.99\").    Weight as of this encounter: 107.8 kg (237 lb 11.2 oz). Body surface area is 2.1 meters squared.  Extreme Pain (8) Comment: Data Unavailable   Patient's last menstrual period was 09/27/2009.  Allergies reviewed: Yes  Medications reviewed: Yes    Medications: Medication refills not needed today.  Pharmacy name entered into [employer]:    [place], [place] - [address] [id number] [place], [place] - [address] [facility] [place], [place] - [address] [place] [address]    Clinical concerns: 6 month recheck Erythrocytosis, SECONDARY POLYCYTHEMIA , review labs.     Janice Gonzalez, [facility]            "

## 2019-09-23 NOTE — ASSESSMENT & PLAN NOTE
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 She was previously seen by Dr. Lomax on 10/09/2011, but has been lost from followup. The patient has had erythrocytosis with a hemoglobin around 20-23 and hemoglobin and hematocrit of 60-65% for most of her life. She had a previous workup by Dr. Lomax and also by another hematologist at [facility]. Review of her previous tests and workup showed a hemoglobin electrophoresis on 01/17/2007 that showed a hemoglobin A1 of 52%, A2 at 0, hemoglobin F at 1.4% and other hemoglobin 46.2%. JAK2 was negative on 07/31/2007. Hemoglobin affinity testing on 07/31/2007 showed a P50 of 27 (normal between 25 and 29). Repeat hemoglobin electrophoresis on 10/04/2011 showed the hemoglobin was at 39.9%. This illuminated hemoglobin was suggestive of beta globin variant of hemoglobin Hb [place]. She was also seen by Dr. Peres in 2007 and most of the workup is from there. Erythropoietin level was in the range of the 80s. Vitamin B12 and folic acid were within normal limits. Abdomen ultrasound did not show hepatosplenomegaly, but there is fatty infiltration of the liver. According to the chart, the patient had phlebotomies done in the past to keep her hemoglobin below 20 g/dl. This was done over the summertime of 2011. Most recently she didn't have any phlebotomy. She was started on anticoagulation with Coumadin as she was felt to be at high risk for blood clots and given history of severe arterial disease and stroke in the past. The patient is safe to use her anticoagulation and her most recent INR was 1.74. Evidently her carbon monoxide is also elevated. She is an active smoker, but she is cutting down from 2 packs per day to  3 cigarettes a day over the last one month. Patient denies any DVT or PE. She also evidently claims that her mother and her first son are also having the same hemoglobinopathy and are being followed by their physicians. Bone marrow biopsy on 11/17/2014 showed no clonal chromosomal abnormality and no cytogenetic evidence of a malignant process. She also tested negative for the JAK2 mutation. Leukemia lymphoma evaluation additionally showed no aberrant immunophenotype on T cells and no increase in or abnormal appearing myeloid blasts.

## 2019-09-23 NOTE — PROGRESS NOTES
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 She was previously seen by Dr. Lomax on 10/09/2011, but has been lost from followup. The patient has had erythrocytosis with a hemoglobin around 20-23 and hemoglobin and hematocrit of 60-65% for most of her life. She had a previous workup by Dr. Lomax and also by another hematologist at [facility]. Review of her previous tests and workup showed a hemoglobin electrophoresis on 01/17/2007 that showed a hemoglobin A1 of 52%, A2 at 0, hemoglobin F at 1.4% and other hemoglobin 46.2%. JAK2 was negative on 07/31/2007. Hemoglobin affinity testing on 07/31/2007 showed a P50 of 27 (normal between 25 and 29). Repeat hemoglobin electrophoresis on 10/04/2011 showed the hemoglobin was at 39.9%. This illuminated hemoglobin was suggestive of beta globin variant of hemoglobin Hb [place]. She was also seen by Dr. Peres in 2007 and most of the workup is from there. Erythropoietin level was in the range of the 80s. Vitamin B12 and folic acid were within normal limits. Abdomen ultrasound did not show hepatosplenomegaly, but there is fatty infiltration of the liver. According to the chart, the patient had phlebotomies done in the past to keep her hemoglobin below 20 g/dl. This was done over the summertime of 2011. Most recently she didn't have any phlebotomy. She was started on anticoagulation with Coumadin as she was felt to be at high risk for blood clots and given  history of severe arterial disease and stroke in the past. The patient is safe to use her anticoagulation and her most recent INR was 1.74. Evidently her carbon monoxide is also elevated. She is an active smoker, but she is cutting down from 2 packs per day to 3 cigarettes a day over the last one month. Patient denies any DVT or PE. She also evidently claims that her mother and her first son are also having the same hemoglobinopathy and are being followed by their physicians. Bone marrow biopsy on 11/17/2014 showed no clonal chromosomal abnormality and no cytogenetic evidence of a malignant process. She also tested negative for the JAK2 mutation. Leukemia lymphoma evaluation additionally showed no aberrant immunophenotype on T cells and no increase in or abnormal appearing myeloid blasts.       Interval History:  Patient returning today for follow-up visit.  She concluded for treatments with phlebotomy over the last month.  She continues to feel well without any new symptoms of shortness of breath or cough or wheezing.  She denies any chest pain.  She denies any dizzy spells.    Review Of Systems:  Constitutional: Negative for fever, chills, and night sweats.  Skin: negative.  Eyes: negative.  Ears/Nose/Throat: negative.  Respiratory: No shortness of breath, dyspnea on exertion, cough, or hemoptysis.  Cardiovascular: negative.  Gastrointestinal: negative.  Genitourinary: negative.  Musculoskeletal: negative.  Neurologic: negative.  Psychiatric: negative.  Hematologic/Lymphatic/Immunologic: negative.  Endocrine: negative.    All other ROS negative unless mentioned in interval history.    Past medical, social, surgical, and family histories reviewed.    Allergies:  Allergies as of 09/18/2019 - Reviewed 09/18/2019   Allergen Reaction Noted     Darvocet [propoxyphene n-apap] Anaphylaxis 07/26/2006     Percocet [oxycodone-acetaminophen] Anaphylaxis, Hives, and Swelling 06/01/2016     Asa [aspirin] Hives 07/10/2006      Bee  03/26/2010     Ibuprofen Swelling 03/30/2018     Lyrica [pregabalin] Other (See Comments) and Swelling 12/04/2015     Povidone iodine Rash 01/25/2017     Tape [adhesive tape] Rash 08/03/2006       Current Medications:  Current Outpatient Medications   Medication Sig Dispense Refill     acetaminophen (TYLENOL) 500 MG tablet Take 500-1,000 mg by mouth every 6 hours as needed for mild pain       albuterol (2.5 MG/3ML) 0.083% neb solution Take 1 vial (2.5 mg) by nebulization every 6 hours as needed for shortness of breath / dyspnea or wheezing 1 Box 0     albuterol (PROAIR HFA/PROVENTIL HFA/VENTOLIN HFA) 108 (90 BASE) MCG/ACT Inhaler Inhale 2 puffs into the lungs every 6 hours as needed for shortness of breath / dyspnea or wheezing 3 Inhaler 1     allopurinol (ZYLOPRIM) 300 MG tablet Take 1 tablet (300 mg) by mouth daily 90 tablet 3     ARIPiprazole (ABILIFY) 10 MG tablet TAKE ONE TABLET BY MOUTH EVERY DAY 90 tablet 1     buPROPion (WELLBUTRIN XL) 150 MG 24 hr tablet TAKE ONE TABLET BY MOUTH EVERY DAY IN THE EVENING 150 tablet 0     furosemide (LASIX) 20 MG tablet TAKE TWO TABLETS BY MOUTH TWICE DAILY 360 tablet 3     gabapentin (NEURONTIN) 300 MG capsule TAKE ONE CAPSULE BY MOUTH ONCE DAILY AT BEDTIME WITH A 600 MG TABLET FOR A TOTAL DOSE  MG 90 capsule 0     gabapentin (NEURONTIN) 600 MG tablet TAKE ONE TABLET BY MOUTH THREE TIMES A DAY 90 tablet 0     HYDROmorphone (DILAUDID) 2 MG tablet Take 0.5-1 tablets (1-2 mg) by mouth every 3 hours as needed for moderate to severe pain 10 tablet 0     ipratropium - albuterol 0.5 mg/2.5 mg/3 mL (DUONEB) 0.5-2.5 (3) MG/3ML neb solution Take 1 vial (3 mLs) by nebulization every 6 hours as needed for shortness of breath / dyspnea or wheezing 30 vial 1     lamoTRIgine (LAMICTAL) 100 MG tablet Take 1 tablet (100 mg) by mouth daily 90 tablet 3     levothyroxine (SYNTHROID/LEVOTHROID) 150 MCG tablet Take 1 tablet (150 mcg) by mouth daily 90 tablet 2     mometasone-formoterol  "(DULERA) 200-5 MCG/ACT oral inhaler Inhale 2 puffs into the lungs 2 times daily 13 g 1     omeprazole (PRILOSEC) 20 MG DR capsule TAKE ONE CAPSULE BY MOUTH TWICE A  capsule 1     order for DME Equipment being ordered:x2 [employer] 30/40mmHg compression wraps with x2 extra prs of compression liners 1 each 0     order for DME Equipment being ordered: Nebulizer 1 Device 0     order for DME Equipment being ordered: CPAP  AIRSENSE 10  5-18 CM H20  # [id number]   DN# [id number]       order for DME Equipment being ordered: [id number]\" x2pair     \"20-30mmHg [place] Hybrid Liners\" x 2 pair 2 Units 11     order for DME Equipment being ordered: DEPENDS SIZE LARGE 60 Month 5     order for DME Equipment being ordered: INCONTINENCE PADS   QID 1 Month 11     potassium chloride SA (K-DUR/KLOR-CON M) 10 MEQ CR tablet TAKE ONE TABLET BY MOUTH ONE TIME DAILY 90 tablet 3     pramipexole (MIRAPEX) 0.125 MG tablet TAKE 1-2 TABLETS BY MOUTH AT BEDTIME 60 tablet 10     simvastatin (ZOCOR) 20 MG tablet Take 1 tablet (20 mg) by mouth At Bedtime 30 tablet 0     traZODone (DESYREL) 150 MG tablet Take 1 tablet (150 mg) by mouth At Bedtime 90 tablet 1     VITAMIN D3 1000 units tablet TAKE ONE TABLET BY MOUTH ONCE DAILY 100 tablet 2     warfarin (JANTOVEN) 5 MG tablet 10 mg (5 mg x 2) every day or as directed by the Anticoagulation Clinic 170 tablet 0     EPINEPHrine (EPIPEN/ADRENACLICK/OR ANY BX GENERIC EQUIV) 0.3 MG/0.3ML injection 2-pack Inject 0.3 mLs (0.3 mg) into the muscle once as needed for anaphylaxis (Patient not taking: Reported on 9/18/2019) 0.6 mL 0        Physical Exam:  /50 (BP Location: Right arm, Patient Position: Sitting, Cuff Size: Adult Large)   Pulse 71   Temp 97.9  F (36.6  C) (Tympanic)   Resp 18   Ht 1.473 m (4' 9.99\")   Wt 107.8 kg (237 lb 11.2 oz)   LMP 09/27/2009   SpO2 94%   Breastfeeding? No   BMI 49.69 kg/m    Wt Readings from Last 12 Encounters:   09/18/19 107.8 kg (237 lb 11.2 oz)   06/19/19 106.8 " kg (235 lb 8 oz)   06/12/19 109 kg (240 lb 4.8 oz)   06/07/19 104 kg (229 lb 4.5 oz)   06/06/19 107.5 kg (237 lb)   05/22/19 108.6 kg (239 lb 8 oz)   03/29/19 107.9 kg (237 lb 12.8 oz)   01/11/19 105.7 kg (233 lb)   01/03/19 105.7 kg (233 lb)   12/19/18 104.8 kg (231 lb)   12/18/18 101.2 kg (223 lb)   10/03/18 105.1 kg (231 lb 9.6 oz)     GENERAL APPEARANCE: Healthy, alert and in no acute distress.  HEENT: Sclerae anicteric. PERRLA. Oropharynx without ulcers, lesions, or thrush.  NECK: Supple. No asymmetry or masses.  LYMPHATICS: No palpable cervical, supraclavicular, axillary, or inguinal lymphadenopathy.  RESP: Lungs clear to auscultation bilaterally without rales, rhonchi or wheezes.  CARDIOVASCULAR: Regular rate and rhythm. Normal S1, S2; no S3 or S4. No murmur, gallop, or rub.  ABDOMEN: Soft, nontender. Bowel sounds normal. No palpable organomegaly or masses.  MUSCULOSKELETAL: Extremities without gross deformities noted. No edema of bilateral lower extremities.  SKIN: No suspicious lesions or rashes.  NEURO: Alert and oriented x 3. Cranial nerves II-XII grossly intact.  PSYCHIATRIC: Mentation and affect appear normal.    Laboratory/Imaging Studies:  Infusion Therapy Visit on 09/11/2019   Component Date Value Ref Range Status     WBC 09/11/2019 5.0  4.0 - 11.0 10e9/L Final     RBC Count 09/11/2019 5.52* 3.8 - 5.2 10e12/L Final     Hemoglobin 09/11/2019 15.8* 11.7 - 15.7 g/dL Final     Hematocrit 09/11/2019 51.6* 35.0 - 47.0 % Final     MCV 09/11/2019 94  78 - 100 fl Final     MCH 09/11/2019 28.6  26.5 - 33.0 pg Final     MCHC 09/11/2019 30.6* 31.5 - 36.5 g/dL Final     RDW 09/11/2019 18.4* 10.0 - 15.0 % Final     Platelet Count 09/11/2019 127* 150 - 450 10e9/L Final     Diff Method 09/11/2019 Automated Method   Final     % Neutrophils 09/11/2019 61.4  % Final     % Lymphocytes 09/11/2019 29.0  % Final     % Monocytes 09/11/2019 7.4  % Final     % Eosinophils 09/11/2019 1.2  % Final     % Basophils 09/11/2019 0.4   % Final     % Immature Granulocytes 09/11/2019 0.6  % Final     Nucleated RBCs 09/11/2019 0  0 /100 Final     Absolute Neutrophil 09/11/2019 3.1  1.6 - 8.3 10e9/L Final     Absolute Lymphocytes 09/11/2019 1.5  0.8 - 5.3 10e9/L Final     Absolute Monocytes 09/11/2019 0.4  0.0 - 1.3 10e9/L Final     Absolute Eosinophils 09/11/2019 0.1  0.0 - 0.7 10e9/L Final     Absolute Basophils 09/11/2019 0.0  0.0 - 0.2 10e9/L Final     Abs Immature Granulocytes 09/11/2019 0.0  0 - 0.4 10e9/L Final     Absolute Nucleated RBC 09/11/2019 0.0   Final     INR 09/11/2019 2.89* 0.86 - 1.14 Final            Assessment and plan:    (D75.1) Erythrocytosis  (primary encounter diagnosis)  We will continue to monitor the patient hematocrit and offer phlebotomy if needed.  The patient currently asymptomatic.  I will see the patient again in 3 months or sooner if there are new developments or concerns.    (J44.9) Chronic obstructive pulmonary disease, unspecified COPD type (H)  Patient currently on albuterol inhaler.  She did stop smoking.    (I10) HTN, goal below 140/90  Blood pressures currently well controlled.    (F33.2) Severe episode of recurrent major depressive disorder, without psychotic features (H)  Patient currently on Wellbutrin  mg daily.    The patient is ready to learn, no apparent learning barriers were identified.  Diagnosis and treatment plans were explained to the patient. The patient expressed understanding of the content. The patient asked appropriate questions. The patient questions were answered to her satisfaction.    Chart documentation with Dragon Voice recognition Software. Although reviewed after completion, some words and grammatical errors may remain.

## 2019-09-27 ENCOUNTER — OFFICE VISIT (OUTPATIENT)
Dept: PSYCHOLOGY | Facility: CLINIC | Age: 54
End: 2019-09-27
Payer: COMMERCIAL

## 2019-09-27 ENCOUNTER — ANTICOAGULATION THERAPY VISIT (OUTPATIENT)
Dept: ANTICOAGULATION | Facility: CLINIC | Age: 54
End: 2019-09-27
Payer: COMMERCIAL

## 2019-09-27 DIAGNOSIS — I63.9 STROKE (H): ICD-10-CM

## 2019-09-27 DIAGNOSIS — F31.62 MODERATE MIXED BIPOLAR I DISORDER (H): Primary | ICD-10-CM

## 2019-09-27 DIAGNOSIS — E78.00 HYPERCHOLESTEREMIA: ICD-10-CM

## 2019-09-27 DIAGNOSIS — E03.9 HYPOTHYROIDISM: Chronic | ICD-10-CM

## 2019-09-27 DIAGNOSIS — I73.9 PVD (PERIPHERAL VASCULAR DISEASE) WITH CLAUDICATION (H): ICD-10-CM

## 2019-09-27 DIAGNOSIS — D75.1 ERYTHROCYTOSIS: ICD-10-CM

## 2019-09-27 DIAGNOSIS — I82.409 DVT (DEEP VENOUS THROMBOSIS) (H): ICD-10-CM

## 2019-09-27 DIAGNOSIS — Z86.73 HISTORY OF STROKE: ICD-10-CM

## 2019-09-27 LAB
BASOPHILS # BLD AUTO: 0 10E9/L (ref 0–0.2)
BASOPHILS NFR BLD AUTO: 0.4 %
CHOLEST SERPL-MCNC: 114 MG/DL
DIFFERENTIAL METHOD BLD: ABNORMAL
EOSINOPHIL # BLD AUTO: 0.1 10E9/L (ref 0–0.7)
EOSINOPHIL NFR BLD AUTO: 1.2 %
ERYTHROCYTE [DISTWIDTH] IN BLOOD BY AUTOMATED COUNT: 18.9 % (ref 10–15)
HCT VFR BLD AUTO: 48.1 % (ref 35–47)
HDLC SERPL-MCNC: 31 MG/DL
HGB BLD-MCNC: 14.4 G/DL (ref 11.7–15.7)
INR PPP: 1.37 (ref 0.86–1.14)
LDLC SERPL CALC-MCNC: 48 MG/DL
LYMPHOCYTES # BLD AUTO: 1.4 10E9/L (ref 0.8–5.3)
LYMPHOCYTES NFR BLD AUTO: 28.7 %
MCH RBC QN AUTO: 27.9 PG (ref 26.5–33)
MCHC RBC AUTO-ENTMCNC: 29.9 G/DL (ref 31.5–36.5)
MCV RBC AUTO: 93 FL (ref 78–100)
MONOCYTES # BLD AUTO: 0.3 10E9/L (ref 0–1.3)
MONOCYTES NFR BLD AUTO: 6.4 %
NEUTROPHILS # BLD AUTO: 3.1 10E9/L (ref 1.6–8.3)
NEUTROPHILS NFR BLD AUTO: 63.3 %
NONHDLC SERPL-MCNC: 83 MG/DL
PLATELET # BLD AUTO: 112 10E9/L (ref 150–450)
RBC # BLD AUTO: 5.17 10E12/L (ref 3.8–5.2)
T4 FREE SERPL-MCNC: 1.17 NG/DL (ref 0.76–1.46)
TRIGL SERPL-MCNC: 173 MG/DL
TSH SERPL DL<=0.005 MIU/L-ACNC: 6.91 MU/L (ref 0.4–4)
WBC # BLD AUTO: 4.8 10E9/L (ref 4–11)

## 2019-09-27 PROCEDURE — 84443 ASSAY THYROID STIM HORMONE: CPT | Performed by: INTERNAL MEDICINE

## 2019-09-27 PROCEDURE — 85610 PROTHROMBIN TIME: CPT | Performed by: INTERNAL MEDICINE

## 2019-09-27 PROCEDURE — 36415 COLL VENOUS BLD VENIPUNCTURE: CPT | Performed by: INTERNAL MEDICINE

## 2019-09-27 PROCEDURE — 84439 ASSAY OF FREE THYROXINE: CPT | Performed by: INTERNAL MEDICINE

## 2019-09-27 PROCEDURE — 99207 ZZC NO CHARGE NURSE ONLY: CPT

## 2019-09-27 PROCEDURE — 80061 LIPID PANEL: CPT | Performed by: INTERNAL MEDICINE

## 2019-09-27 PROCEDURE — 85025 COMPLETE CBC W/AUTO DIFF WBC: CPT | Performed by: INTERNAL MEDICINE

## 2019-09-27 PROCEDURE — 90834 PSYTX W PT 45 MINUTES: CPT | Performed by: PSYCHOLOGIST

## 2019-09-27 NOTE — PROGRESS NOTES
ANTICOAGULATION FOLLOW-UP CLINIC VISIT    Patient Name:  Bia Bill  Date:  2019  Contact Type:  Telephone/ spoke with Norah on the phone    SUBJECTIVE:  Patient Findings     Positives:   Missed doses    Comments:   Patient states she missed her warfarin dose yesterday but has not missed any other doses and has been taking 10 mg daily. She did have a mixed salad on Monday but no other greens. No other changes. Writer recommended she take 50% increased dose today and tomorrow, then resume 10 mg daily. Recheck INR in 6 days at lab.        Clinical Outcomes     Comments:   Patient states she missed her warfarin dose yesterday but has not missed any other doses and has been taking 10 mg daily. She did have a mixed salad on Monday but no other greens. No other changes. Writer recommended she take 50% increased dose today and tomorrow, then resume 10 mg daily. Recheck INR in 6 days at lab.           OBJECTIVE    INR   Date Value Ref Range Status   2019 1.37 (H) 0.86 - 1.14 Final     Comment:     Special tube used to correct for high hematocrit       ASSESSMENT / PLAN  INR assessment SUB missed dose   Recheck INR In: 6 DAYS    INR Location Outside lab INR at lab     Anticoagulation Summary  As of 2019    INR goal:   2.0-2.5   TTR:   46.6 % (4.4 y)   INR used for dosin.37! (2019)   Warfarin maintenance plan:   10 mg (5 mg x 2) every day   Full warfarin instructions:   : 15 mg; : 15 mg; Otherwise 10 mg every day   Weekly warfarin total:   70 mg   Plan last modified:   Reina Forrest RN (2019)   Next INR check:   10/3/2019   Priority:   INR   Target end date:   Indefinite    Indications    PVD (peripheral vascular disease) with claudication (H) [I73.9]  Long term current use of anticoagulant therapy (Resolved) [Z79.01]  DVT (deep venous thrombosis) (H) [I82.409]  Stroke (H) [I63.9]             Anticoagulation Episode Summary     INR check location:       Preferred lab:        Send INR reminders to:   [place] PHONE [facility]    Comments:   * lab draw due to elevated hematocrit (fingerstick meters don't work). LEFT LE. STENT x 3 LEFT UPPER LEG. Previous arterial clot. New goal range 2-2.5 starting 11/6/17.      Anticoagulation Care Providers     Provider Role Specialty Phone number    Kd Leong MD [facility] [phone]            See the Encounter Report to view Anticoagulation Flowsheet and Dosing Calendar (Go to Encounters tab in chart review, and find the Anticoagulation Therapy Visit)        Mireille Torres RN

## 2019-10-01 DIAGNOSIS — G63 POLYNEUROPATHY IN OTHER DISEASES CLASSIFIED ELSEWHERE (H): Chronic | ICD-10-CM

## 2019-10-01 DIAGNOSIS — I89.0 LYMPHEDEMA: ICD-10-CM

## 2019-10-01 RX ORDER — POTASSIUM CHLORIDE 750 MG/1
TABLET, EXTENDED RELEASE ORAL
Qty: 90 TABLET | Refills: 0 | Status: SHIPPED | OUTPATIENT
Start: 2019-10-01 | End: 2019-12-23

## 2019-10-01 ASSESSMENT — ANXIETY QUESTIONNAIRES
6. BECOMING EASILY ANNOYED OR IRRITABLE: NEARLY EVERY DAY
4. TROUBLE RELAXING: MORE THAN HALF THE DAYS
GAD7 TOTAL SCORE: 9
1. FEELING NERVOUS, ANXIOUS, OR ON EDGE: SEVERAL DAYS
7. FEELING AFRAID AS IF SOMETHING AWFUL MIGHT HAPPEN: SEVERAL DAYS
3. WORRYING TOO MUCH ABOUT DIFFERENT THINGS: NOT AT ALL
2. NOT BEING ABLE TO STOP OR CONTROL WORRYING: SEVERAL DAYS
5. BEING SO RESTLESS THAT IT IS HARD TO SIT STILL: SEVERAL DAYS

## 2019-10-01 ASSESSMENT — PATIENT HEALTH QUESTIONNAIRE - PHQ9: SUM OF ALL RESPONSES TO PHQ QUESTIONS 1-9: 10

## 2019-10-01 NOTE — TELEPHONE ENCOUNTER
"POTASSIUM CHLORIDE PLACIDO ER 10 TBCR      Last Written Prescription Date:  9/17/18  Last Fill Quantity: 90,   # refills: 3  Last Office Visit: 6/19/19  Future Office visit:    Next 5 appointments (look out 90 days)    Oct 08, 2019  2:00 PM CDT  Return Visit with Kd Morris  [facility] ([facility]) [address] [address]  [phone]   Oct 23, 2019  8:30 AM CDT  Return Visit with Kd Morris  [facility] ([facility]) [address] [address]  [phone]   Dec 18, 2019 11:15 AM CST  Return Visit with Joshua Watts MD  [facility] ([facility]) [facility]  [address]  [facility] [address]  [phone]           Requested Prescriptions   Pending Prescriptions Disp Refills     potassium chloride ER (K-DUR/KLOR-CON M) 10 MEQ CR tablet [Pharmacy Med Name: POTASSIUM CHLORIDE PLACIDO ER 10 TBCR] 210 tablet 0     Sig: TAKE ONE TABLET BY MOUTH ONCE DAILY       Potassium Supplements Protocol Passed - 10/1/2019  2:53 PM        Passed - Recent (12 mo) or future (30 days) visit within the authorizing provider's department     Patient has had an office visit with the authorizing provider or a provider within the authorizing providers department within the previous 12 mos or has a future within next 30 days. See \"Patient Info\" tab in inbasket, or \"Choose Columns\" in Meds & Orders section of the refill encounter.              Passed - Medication is active on med list        Passed - Patient is age 18 or older        Passed - Normal serum potassium in past 12 months     Recent Labs   Lab Test 06/07/19  0537   POTASSIUM 3.5                      "

## 2019-10-01 NOTE — PROGRESS NOTES
Progress Note  Disclaimer: This note consists of symbols derived from keyboarding, dictation and/or voice recognition software. As a result, there may be errors in the script that have gone undetected. Please consider this when interpreting information found in this chart.    Client Name: Bia Bill  Date: 9/27/2019         Service Type: Individual      Session Start Time: 12:00 PM   session End Time 12:45 PM   session Length: 45     Session #: 46     Attendees: Client attended alone    Treatment Plan Last Reviewed: 4/3/2019  PHQ-9 / CHIN-7 : See flowsheet     DATA  Client reports she is experiencing a little bit more pain, possibly because of the change in seasons.  She notes her irritability increases when she is in greater pain.  She will be going to the [facility] clinic as she has been involved with the [employer] for a sufficient period of time.  She is planning on going to her daughter's place for the weekend, she is willing to see how things go.  Her difficulties with blood thickness continue.  If it is too thick she gets tired and sleepy.   Progress Since Last Session (Related to Symptoms / Goals / Homework):   Symptoms: Stable    Homework: Achieved / completed to satisfaction      Episode of Care Goals: Satisfactory progress - ACTION (Actively working towards change); Intervened by reinforcing change plan / affirming steps taken     Current / Ongoing Stressors and Concerns:   Recurrent depression  family relational problems, medical problems, chronic pain, trauma history, recent death of longtime friend.     Treatment Objective(s) Addressed in This Session:   Increase interest, engagement, and pleasure in doing things  Decrease frequency and intensity of feeling down, depressed, hopeless       Intervention:   CBT: Behavioral activation supported client's effort at establishing maintaining healthy boundaries with her family.  Reinforced client's  care for her grandchildren     ASSESSMENT: Current Emotional / Mental Status (status of significant symptoms):   Risk status (Self / Other harm or suicidal ideation)   Client denies current fears or concerns for personal safety.   Client denies current or recent suicidal ideation or behaviors.   Client denies current or recent homicidal ideation or behaviors.   Client denies current or recent self injurious behavior or ideation.   Client denies other safety concerns.   A safety and risk management plan has not been developed at this time, however client was given the after-hours number / 911 should there be a change in any of these risk factors.     Appearance:   Appropriate    Eye Contact:   Good    Psychomotor Behavior: Normal    Attitude:   Cooperative    Orientation:   All   Speech    Rate / Production: Normal     Volume:  Normal    Mood:    Normal   Affect:    Appropriate    Thought Content:  Clear    Thought Form:  Coherent  Logical    Insight:    Good      Medication Review:   No changes to current psychiatric medication(s)     Medication Compliance:   Yes     Changes in Health Issues:   None reported     Chemical Use Review:   Substance Use: Chemical use reviewed, no active concerns identified      Tobacco Use: Client reports she is down to 1 cigarette per day from 2 packs/day.   Collateral Reports Completed:   Not Applicable    PLAN: (Client Tasks / Therapist Tasks / Other)  Client to continue With her self-care routine and maintenance of boundaries.    Practice one to 2 grounding techniques each day.   Client to maintain sobriety and contact with supportive sober others.    Kd Morris                                                         ________________________________________________________________________    Treatment Plan    Client's Name: Bia Bill  YOB: 1965    Date: 10/24/2017      DSM5 Diagnoses: (Sustained by DSM5 Criteria Listed Above)  Diagnoses: 296.40 Bipolar I  Disorder Current or Most Recent Episode Manic, Unspecified  Psychosocial & Contextual Factors: financial difficulties, chronic pain, roommate issues  WHODAS 2.0 (12 item)               This questionnaire asks about difficulties due to health conditions. Health conditions             include disease or illnesses, other health problems that may be short or long lasting,             injuries, mental health or emotional problems, and problems with alcohol or drugs.                         Think back over the past 30 days and answer these questions, thinking about how much             difficulty you had doing the following activities. For each question, please [place] only             one response.      S1  Standing for long periods such as 30 minutes?  Mild =           2    S2  Taking care of household responsibilities?  Moderate =   3    S3  Learning a new task, for example, learning how to get to a new place?  Mild =           2    S4  How much of a problem do you have joining community activities (for example, festivals, [employer] or other activities) in the same way as anyone else can?  Moderate =   3    S5  How much have you been emotionally affected by your health problems?  Mild =           2        In the past 30 days, how much difficulty did you have in:    S6  Concentrating on doing something for ten minutes?  Mild =           2    S7  Walking a long distance such as a kilometer (or equivalent)?  Severe =       4    S8  Washing your whole body?  None =         1    S9  Getting dressed?  None =         1    S10  Dealing with people you do not know?  Moderate =   3    S11  Maintaining a friendship?  Severe =       4    S12  Your day to day work?  Moderate =   3       H1  Overall, in the past 30 days, how many days were these difficulties present?  Record number of days seven    H2  In the past 30 days, for how many days were you totally unable to carry out your usual activities or work because of any health  condition?  Record number of days  seven    H3  In the past 30 days, not counting the days that you were totally unable, for how many days did you cut back or reduce your usual activities or work because of any health condition?  Record number of days five                   Referral / Collaboration:  Referral to another professional/service is not indicated at this time..    Anticipated number of session or this episode of care: 15    Goals  1. Education- the Biopsychosocial model of depression  a. Client will be able to describe how depression is effecting them physically, emotionally and socially  2. Behavioral Activation  a. Client will learn to assess their depression on a day to day basis  b. Client will Identify two forms of exercise/activity and engage in them 3 times per week  c. Client will Identify 3 things that make them laugh, and engage in these 5 times per week.  d. Client will Identify 1-2Creative activities or hobbies  and engage in them 2 times per week  e. Client will identify music, movies, books that make them feel good and use them 3-4 times per week  3. Self-care  a. Client will identify 5 things they can do just for themselves  b. Client will take time for quiet, reflection, meditation 5 times per week  c. Client will Learn to set boundaries when appropriate  d. Client will Identify 2 individuals they can call on for support, distraction  4. Assessment of progress  a. Client will engage in assessment of their progress on a regular basis    Bipolar Disorder  Treatment plan:  1. Education- the Biopsychosocial model of Bipolar Disorder    a. Client will be able to describe in general terms what Bipolar Disorder  is and is not  b. Client will be able to describe how Bipolar Disorder  has affected their life in at least two different areas, such as school or work and Home/relationships  c. Clients parents/guardians or significant others will be provided information on what Bipolar Disorder  is and  the ways it can affect relationships and be encouraged to be a part of clients treatment team.  2. Medication  a. Client will participate in medication evaluation for Bipolar Disorder  symptoms and follow medication recommendations.   3. Identification and management of triggers  a. Client and therapist will examine patient s history to determine if there are predictable triggers for manic or depressive episodes (e.g. boredom, anger, family stress)  b. Client and therapist will develop means of diffusing these triggers (e.g. relaxation strategies, boundary setting, anger management)  4. Comorbid conditions   a. Client and therapist will asses for comorbid conditions (e.g. anxiety, depression, substance use). and add additional items to treatment plan as needed  5. Self-care  a. Client will identify 3 things they can do just for themselves  b. Client will take time for quiet, reflection, meditation 3 times per week  c. Client will Learn to set boundaries when appropriate  d. Client will Identify 2 individuals they can call on for support, distraction  5. Behavioral Activation  a. Client will Identify two forms of exercise/activity and engage in them 3 times per week  b. Client will Identify 2 things that make them laugh, and engage in these 3 times per week.  c. Client will Identify 2 Creative activities or hobbies  and engage in them 2 times per week  d. Client will identify music, movies, books that make them feel good and use them 3 times per week  6. Assessment of progress  a. Client will engage in assessment of their progress on a regular basis    Client has reviewed and agreed to the above plan.      Kd Morris  4/3/2018

## 2019-10-01 NOTE — TELEPHONE ENCOUNTER
GABAPENTIN 600MG TABS      Last Written Prescription Date:  5/22/19  Last Fill Quantity: 90,   # refills: 0  Last Office Visit: 6/19/19  Future Office visit:    Next 5 appointments (look out 90 days)    Oct 08, 2019  2:00 PM CDT  Return Visit with [facility] ([facility]) [address] [place] [id number]  [phone]   Oct 23, 2019  8:30 AM CDT  Return Visit with Kd CHAWLA [facility] ([facility]) [address] [facility] [address]  [phone]   Dec 18, 2019 11:15 AM CST  Return Visit with Joshua Watts MD  [facility] ([facility] [address]  [facility] [address]  [phone]           Routing refill request to provider for review/approval because:  Drug not on the [facility], [facility] or [facility] refill protocol or controlled substance

## 2019-10-02 RX ORDER — GABAPENTIN 600 MG/1
TABLET ORAL
Qty: 90 TABLET | Refills: 3 | Status: SHIPPED | OUTPATIENT
Start: 2019-10-02 | End: 2020-02-10

## 2019-10-02 ASSESSMENT — ANXIETY QUESTIONNAIRES: GAD7 TOTAL SCORE: 9

## 2019-10-03 ENCOUNTER — ANTICOAGULATION THERAPY VISIT (OUTPATIENT)
Dept: ANTICOAGULATION | Facility: CLINIC | Age: 54
End: 2019-10-03

## 2019-10-03 DIAGNOSIS — I82.409 DVT (DEEP VENOUS THROMBOSIS) (H): ICD-10-CM

## 2019-10-03 DIAGNOSIS — Z79.01 LONG TERM CURRENT USE OF ANTICOAGULANT THERAPY: ICD-10-CM

## 2019-10-03 DIAGNOSIS — I73.9 PVD (PERIPHERAL VASCULAR DISEASE) WITH CLAUDICATION (H): ICD-10-CM

## 2019-10-03 DIAGNOSIS — I63.9 STROKE (H): ICD-10-CM

## 2019-10-03 DIAGNOSIS — Z86.73 HISTORY OF STROKE: ICD-10-CM

## 2019-10-03 LAB
HCT VFR BLD AUTO: 49.1 % (ref 35–47)
INR PPP: 3.13 (ref 0.86–1.14)

## 2019-10-03 PROCEDURE — 36415 COLL VENOUS BLD VENIPUNCTURE: CPT | Performed by: FAMILY MEDICINE

## 2019-10-03 PROCEDURE — 99207 ZZC NO CHARGE NURSE ONLY: CPT

## 2019-10-03 PROCEDURE — 85014 HEMATOCRIT: CPT | Performed by: FAMILY MEDICINE

## 2019-10-03 PROCEDURE — 85610 PROTHROMBIN TIME: CPT | Performed by: FAMILY MEDICINE

## 2019-10-03 NOTE — PROGRESS NOTES
VM left for pt to return call to [facility] [phone]. Dosing instructions not given to pt.  Jaleesa Coronado RN on 10/3/2019 at 2:39 PM

## 2019-10-04 NOTE — PROGRESS NOTES
ANTICOAGULATION FOLLOW-UP CLINIC VISIT    Patient Name:  Bia Bill  Date:  10/4/2019  Contact Type:  Telephone/ Left DVM    SUBJECTIVE:  Patient Findings     Positives:   Extra doses    Comments:   Writer left DVM. Requested call back if changes or concerns.  Patient will continue weekly maintenance dose.   Recheck in 2 week.           Clinical Outcomes     Negatives:   Major bleeding event, Thromboembolic event, Anticoagulation-related hospital admission, Anticoagulation-related ED visit, Anticoagulation-related fatality    Comments:   Writer left DVM. Requested call back if changes or concerns.  Patient will continue weekly maintenance dose.   Recheck in 2 week.              OBJECTIVE    INR   Date Value Ref Range Status   10/03/2019 3.13 (H) 0.86 - 1.14 Final       ASSESSMENT / PLAN  INR assessment SUPRA    Recheck INR In: 2 WEEKS    INR Location Outside lab      Anticoagulation Summary  As of 10/3/2019    INR goal:   2.0-2.5   TTR:   46.5 % (4.4 y)   INR used for dosing:   3.13! (10/3/2019)   Warfarin maintenance plan:   10 mg (5 mg x 2) every day   Full warfarin instructions:   10 mg every day   Weekly warfarin total:   70 mg   No change documented:   Reina Forrest RN   Plan last modified:   Reina Forrest RN (8/14/2019)   Next INR check:   10/18/2019   Priority:   INR   Target end date:       Indications    PVD (peripheral vascular disease) with claudication (H) [I73.9]  Long term current use of anticoagulant therapy (Resolved) [Z79.01]  DVT (deep venous thrombosis) (H) [I82.409]  Stroke (H) [I63.9]             Anticoagulation Episode Summary     INR check location:       Preferred lab:       Send INR reminders to:   [place] PHONE ANTICOAG POOL    Comments:   * lab draw due to elevated hematocrit (fingerstick meters don't work). LEFT LE. STENT x 3 LEFT UPPER LEG. Previous arterial clot. New goal range 2-2.5 starting 11/6/17.      Anticoagulation Care Providers     Provider Role Specialty  Phone number    Kd Leong MD [facility] [phone]            See the Encounter Report to view Anticoagulation Flowsheet and Dosing Calendar (Go to Encounters tab in chart review, and find the Anticoagulation Therapy Visit)        Reina Forrest RN

## 2019-10-08 ENCOUNTER — OFFICE VISIT (OUTPATIENT)
Dept: PSYCHOLOGY | Facility: CLINIC | Age: 54
End: 2019-10-08
Payer: COMMERCIAL

## 2019-10-08 DIAGNOSIS — F31.62 MODERATE MIXED BIPOLAR I DISORDER (H): Primary | ICD-10-CM

## 2019-10-08 PROCEDURE — 90834 PSYTX W PT 45 MINUTES: CPT | Performed by: PSYCHOLOGIST

## 2019-10-09 NOTE — TELEPHONE ENCOUNTER
10/09/19    [facility] needs to verify with Dr. Leong if the INR goal range can be changed from 2.0-2.5 to 2.0-3.0. In July [facility] introduced a new protocol and would prefer to manage patient's with INR goal ranges of 2.0-3.0 or 2.5-3.5. Please have Dr. Leong address this and let us know his decision.

## 2019-10-09 NOTE — TELEPHONE ENCOUNTER
Per routing history, it looks like it was routed to Renuka Condon [facility] on 9/16/19 and opened by her. Then it was doned by her on 9/30/19.  Mundo Conde RN

## 2019-10-10 NOTE — PROGRESS NOTES
Progress Note  Disclaimer: This note consists of symbols derived from keyboarding, dictation and/or voice recognition software. As a result, there may be errors in the script that have gone undetected. Please consider this when interpreting information found in this chart.    Client Name: Bia Bill  Date: 10/8/19         Service Type: Individual      Session Start Time: 2:00 PM   session End Time 2:45 PM   session Length: 45     Session #: 47     Attendees: Client attended alone    Treatment Plan Last Reviewed: 4/3/2019  PHQ-9 / CHIN-7 : See flowsheet     DATA  Client reports she Has had to set some boundaries with her sister. Sister, who grew up separately, experienced abuse form their father. Client had a very different relationship and is upset by her sister constantly wanting to talk about is, but not deal with it in therapy.    Progress Since Last Session (Related to Symptoms / Goals / Homework):   Symptoms: Stable    Homework: Achieved / completed to satisfaction      Episode of Care Goals: Satisfactory progress - ACTION (Actively working towards change); Intervened by reinforcing change plan / affirming steps taken     Current / Ongoing Stressors and Concerns:   Recurrent depression  family relational problems, medical problems, chronic pain, trauma history, recent death of longtime friend.     Treatment Objective(s) Addressed in This Session:   Increase interest, engagement, and pleasure in doing things  Decrease frequency and intensity of feeling down, depressed, hopeless       Intervention:   CBT: Behavioral activation supported client's effort at establishing maintaining healthy boundaries with her family.  Reinforced client's care for her grandchildren     ASSESSMENT: Current Emotional / Mental Status (status of significant symptoms):   Risk status (Self / Other harm or suicidal ideation)   Client denies current fears or concerns for personal  safety.   Client denies current or recent suicidal ideation or behaviors.   Client denies current or recent homicidal ideation or behaviors.   Client denies current or recent self injurious behavior or ideation.   Client denies other safety concerns.   A safety and risk management plan has not been developed at this time, however client was given the after-hours number / 911 should there be a change in any of these risk factors.     Appearance:   Appropriate    Eye Contact:   Good    Psychomotor Behavior: Normal    Attitude:   Cooperative    Orientation:   All   Speech    Rate / Production: Normal     Volume:  Normal    Mood:    Normal   Affect:    Appropriate    Thought Content:  Clear    Thought Form:  Coherent  Logical    Insight:    Good      Medication Review:   No changes to current psychiatric medication(s)     Medication Compliance:   Yes     Changes in Health Issues:   None reported     Chemical Use Review:   Substance Use: Chemical use reviewed, no active concerns identified      Tobacco Use: Client reports she is down to 1 cigarette per day from 2 packs/day.   Collateral Reports Completed:   Not Applicable    PLAN: (Client Tasks / Therapist Tasks / Other)  Client to continue With her self-care routine and maintenance of boundaries.    Practice one to 2 grounding techniques each day.   Client to maintain sobriety and contact with supportive sober others.    Kd Morris                                                         ________________________________________________________________________    Treatment Plan    Client's Name: Bia Bill  YOB: 1965    Date: 10/24/2017      DSM5 Diagnoses: (Sustained by DSM5 Criteria Listed Above)  Diagnoses: 296.40 Bipolar I Disorder Current or Most Recent Episode Manic, Unspecified  Psychosocial & Contextual Factors: financial difficulties, chronic pain, roommate issues  WHODAS 2.0 (12 item)               This questionnaire asks about  difficulties due to health conditions. Health conditions             include disease or illnesses, other health problems that may be short or long lasting,             injuries, mental health or emotional problems, and problems with alcohol or drugs.                         Think back over the past 30 days and answer these questions, thinking about how much             difficulty you had doing the following activities. For each question, please [place] only             one response.      S1  Standing for long periods such as 30 minutes?  Mild =           2    S2  Taking care of household responsibilities?  Moderate =   3    S3  Learning a new task, for example, learning how to get to a new place?  Mild =           2    S4  How much of a problem do you have joining community activities (for example, festivals, [employer] or other activities) in the same way as anyone else can?  Moderate =   3    S5  How much have you been emotionally affected by your health problems?  Mild =           2        In the past 30 days, how much difficulty did you have in:    S6  Concentrating on doing something for ten minutes?  Mild =           2    S7  Walking a long distance such as a kilometer (or equivalent)?  Severe =       4    S8  Washing your whole body?  None =         1    S9  Getting dressed?  None =         1    S10  Dealing with people you do not know?  Moderate =   3    S11  Maintaining a friendship?  Severe =       4    S12  Your day to day work?  Moderate =   3       H1  Overall, in the past 30 days, how many days were these difficulties present?  Record number of days seven    H2  In the past 30 days, for how many days were you totally unable to carry out your usual activities or work because of any health condition?  Record number of days  seven    H3  In the past 30 days, not counting the days that you were totally unable, for how many days did you cut back or reduce your usual activities or work because of any health  condition?  Record number of days five                   Referral / Collaboration:  Referral to another professional/service is not indicated at this time..    Anticipated number of session or this episode of care: 15    Goals  1. Education- the Biopsychosocial model of depression  a. Client will be able to describe how depression is effecting them physically, emotionally and socially  2. Behavioral Activation  a. Client will learn to assess their depression on a day to day basis  b. Client will Identify two forms of exercise/activity and engage in them 3 times per week  c. Client will Identify 3 things that make them laugh, and engage in these 5 times per week.  d. Client will Identify 1-2Creative activities or hobbies  and engage in them 2 times per week  e. Client will identify music, movies, books that make them feel good and use them 3-4 times per week  3. Self-care  a. Client will identify 5 things they can do just for themselves  b. Client will take time for quiet, reflection, meditation 5 times per week  c. Client will Learn to set boundaries when appropriate  d. Client will Identify 2 individuals they can call on for support, distraction  4. Assessment of progress  a. Client will engage in assessment of their progress on a regular basis    Bipolar Disorder  Treatment plan:  1. Education- the Biopsychosocial model of Bipolar Disorder    a. Client will be able to describe in general terms what Bipolar Disorder  is and is not  b. Client will be able to describe how Bipolar Disorder  has affected their life in at least two different areas, such as school or work and Home/relationships  c. Clients parents/guardians or significant others will be provided information on what Bipolar Disorder  is and the ways it can affect relationships and be encouraged to be a part of clients treatment team.  2. Medication  a. Client will participate in medication evaluation for Bipolar Disorder  symptoms and follow medication  recommendations.   3. Identification and management of triggers  a. Client and therapist will examine patient s history to determine if there are predictable triggers for manic or depressive episodes (e.g. boredom, anger, family stress)  b. Client and therapist will develop means of diffusing these triggers (e.g. relaxation strategies, boundary setting, anger management)  4. Comorbid conditions   a. Client and therapist will asses for comorbid conditions (e.g. anxiety, depression, substance use). and add additional items to treatment plan as needed  5. Self-care  a. Client will identify 3 things they can do just for themselves  b. Client will take time for quiet, reflection, meditation 3 times per week  c. Client will Learn to set boundaries when appropriate  d. Client will Identify 2 individuals they can call on for support, distraction  5. Behavioral Activation  a. Client will Identify two forms of exercise/activity and engage in them 3 times per week  b. Client will Identify 2 things that make them laugh, and engage in these 3 times per week.  c. Client will Identify 2 Creative activities or hobbies  and engage in them 2 times per week  d. Client will identify music, movies, books that make them feel good and use them 3 times per week  6. Assessment of progress  a. Client will engage in assessment of their progress on a regular basis    Client has reviewed and agreed to the above plan.      Kd Morris  4/3/2018

## 2019-10-11 DIAGNOSIS — I82.409 DVT (DEEP VENOUS THROMBOSIS) (H): ICD-10-CM

## 2019-10-11 DIAGNOSIS — E78.00 HYPERCHOLESTEREMIA: ICD-10-CM

## 2019-10-11 NOTE — TELEPHONE ENCOUNTER
"Requested Prescriptions   Pending Prescriptions Disp Refills     warfarin ANTICOAGULANT (JANTOVEN ANTICOAGULANT) 5 MG tablet [Pharmacy Med Name: JANTOVEN 5MG TABS]  Last Written Prescription Date:  8/14/19  Last Fill Quantity: 170,  # refills: 0   Last office visit: 1/20/2017 with prescribing provider:  genevieve   Future Office Visit:   Next 5 appointments (look out 90 days)    Oct 23, 2019  8:30 AM CDT  Return Visit with Kd CAMMY [facility] ([facility] [address] [address]  [phone]   Nov 06, 2019  8:30 AM CST  Return Visit with Kd CHAWLA [facility] ([facility] [address] [address]  [phone]   Dec 18, 2019 11:15 AM CST  Return Visit with Joshua Watts MD  [facility] ([facility]) [facility]  [address]  [facility] [address]  [phone]          170 tablet 0     Sig: TAKE ONE AND ONE-HALF TABLETS BY  MOUTH ON MONDAY, WEDNESDAY AND FRIDAY AND TWO TABLETS BY MOUTH ALL OTHER DAYS OR AS DIRECTED BY [facility]       Vitamin K Antagonists Failed - 10/11/2019 11:47 AM        Failed - INR is within goal in the past 6 weeks     Confirm INR is within goal in the past 6 weeks.     Recent Labs   Lab Test 10/03/19  0916   INR 3.13*                       Failed - Medication is active on med list        Passed - Recent (12 mo) or future (30 days) visit within the authorizing provider's specialty     Patient has had an office visit with the authorizing provider or a provider within the authorizing providers department within the previous 12 mos or has a future within next 30 days. See \"Patient Info\" tab in inbasket, or \"Choose Columns\" in Meds & Orders section of the refill encounter.              Passed - Patient is 18 years of age or older        Passed - Patient is not pregnant        Passed - No positive pregnancy on file in past 12 months " "       simvastatin (ZOCOR) 20 MG tablet [Pharmacy Med Name: SIMVASTATIN 20MG TABS]  Last Written Prescription Date:  9/11/19  Last Fill Quantity: 30,  # refills: 0   Last office visit: 1/20/2017 with prescribing provider:  genevieve   Future Office Visit:   Next 5 appointments (look out 90 days)    Oct 23, 2019  8:30 AM CDT  Return Visit with [facility] ([facility]) [address] [address]  [phone]   Nov 06, 2019  8:30 AM CST  Return Visit with Kd CHAWLA [facility] ([facility]) [address] [address]  [phone]   Dec 18, 2019 11:15 AM CST  Return Visit with Joshua Watts MD  [facility] ([facility]) [facility]  [address]  [facility] [address]  [phone]        30 tablet 0     Sig: TAKE ONE TABLET BY MOUTH EVERY NIGHT AT BEDTIME       Statins Protocol Passed - 10/11/2019 11:47 AM        Passed - LDL on file in past 12 months     Recent Labs   Lab Test 09/27/19  1309   LDL 48             Passed - No abnormal creatine kinase in past 12 months     Recent Labs   Lab Test 11/30/15  1508                   Passed - Recent (12 mo) or future (30 days) visit within the authorizing provider's specialty     Patient has had an office visit with the authorizing provider or a provider within the authorizing providers department within the previous 12 mos or has a future within next 30 days. See \"Patient Info\" tab in inbasket, or \"Choose Columns\" in Meds & Orders section of the refill encounter.              Passed - Medication is active on med list        Passed - Patient is age 18 or older        Passed - No active pregnancy on record        Passed - No positive pregnancy test in past 12 months          "

## 2019-10-14 RX ORDER — SIMVASTATIN 20 MG
TABLET ORAL
Qty: 90 TABLET | Refills: 3 | Status: SHIPPED | OUTPATIENT
Start: 2019-10-14 | End: 2020-09-17

## 2019-10-14 RX ORDER — WARFARIN SODIUM 5 MG/1
TABLET ORAL
Qty: 180 TABLET | Refills: 0 | Status: SHIPPED | OUTPATIENT
Start: 2019-10-14 | End: 2019-10-23

## 2019-10-18 ENCOUNTER — TELEPHONE (OUTPATIENT)
Dept: ANTICOAGULATION | Facility: CLINIC | Age: 54
End: 2019-10-18

## 2019-10-18 ENCOUNTER — ANTICOAGULATION THERAPY VISIT (OUTPATIENT)
Dept: ANTICOAGULATION | Facility: CLINIC | Age: 54
End: 2019-10-18
Payer: COMMERCIAL

## 2019-10-18 ENCOUNTER — HOSPITAL ENCOUNTER (OUTPATIENT)
Dept: LAB | Facility: CLINIC | Age: 54
Discharge: HOME | End: 2019-10-18
Attending: INTERNAL MEDICINE | Admitting: INTERNAL MEDICINE
Payer: COMMERCIAL

## 2019-10-18 DIAGNOSIS — I73.9 PVD (PERIPHERAL VASCULAR DISEASE) WITH CLAUDICATION (H): ICD-10-CM

## 2019-10-18 DIAGNOSIS — Z86.73 HISTORY OF STROKE: ICD-10-CM

## 2019-10-18 DIAGNOSIS — D75.1 ERYTHROCYTOSIS: ICD-10-CM

## 2019-10-18 DIAGNOSIS — I63.9 STROKE (H): ICD-10-CM

## 2019-10-18 DIAGNOSIS — I82.409 DVT (DEEP VENOUS THROMBOSIS) (H): ICD-10-CM

## 2019-10-18 LAB
BASOPHILS # BLD AUTO: 0 10E9/L (ref 0–0.2)
BASOPHILS NFR BLD AUTO: 0.7 %
DIFFERENTIAL METHOD BLD: ABNORMAL
EOSINOPHIL # BLD AUTO: 0.1 10E9/L (ref 0–0.7)
EOSINOPHIL NFR BLD AUTO: 0.9 %
ERYTHROCYTE [DISTWIDTH] IN BLOOD BY AUTOMATED COUNT: 19.3 % (ref 10–15)
HCT VFR BLD AUTO: 49.8 % (ref 35–47)
HGB BLD-MCNC: 14.6 G/DL (ref 11.7–15.7)
IMM GRANULOCYTES # BLD: 0 10E9/L (ref 0–0.4)
IMM GRANULOCYTES NFR BLD: 0.7 %
INR PPP: 1.14 (ref 0.86–1.14)
LYMPHOCYTES # BLD AUTO: 1.3 10E9/L (ref 0.8–5.3)
LYMPHOCYTES NFR BLD AUTO: 24.1 %
MCH RBC QN AUTO: 26.9 PG (ref 26.5–33)
MCHC RBC AUTO-ENTMCNC: 29.3 G/DL (ref 31.5–36.5)
MCV RBC AUTO: 92 FL (ref 78–100)
MONOCYTES # BLD AUTO: 0.3 10E9/L (ref 0–1.3)
MONOCYTES NFR BLD AUTO: 4.7 %
NEUTROPHILS # BLD AUTO: 3.7 10E9/L (ref 1.6–8.3)
NEUTROPHILS NFR BLD AUTO: 68.9 %
NRBC # BLD AUTO: 0 10*3/UL
NRBC BLD AUTO-RTO: 0 /100
PLATELET # BLD AUTO: 118 10E9/L (ref 150–450)
RBC # BLD AUTO: 5.43 10E12/L (ref 3.8–5.2)
WBC # BLD AUTO: 5.4 10E9/L (ref 4–11)

## 2019-10-18 PROCEDURE — 85025 COMPLETE CBC W/AUTO DIFF WBC: CPT | Performed by: INTERNAL MEDICINE

## 2019-10-18 PROCEDURE — 99207 ZZC NO CHARGE NURSE ONLY: CPT

## 2019-10-18 PROCEDURE — 36415 COLL VENOUS BLD VENIPUNCTURE: CPT | Performed by: INTERNAL MEDICINE

## 2019-10-18 PROCEDURE — 85610 PROTHROMBIN TIME: CPT | Performed by: INTERNAL MEDICINE

## 2019-10-18 NOTE — PROGRESS NOTES
ANTICOAGULATION FOLLOW-UP CLINIC VISIT    Patient Name:  Bia Bill  Date:  10/18/2019  Contact Type:  Telephone    SUBJECTIVE:  Patient Findings     Comments:   No changes in medications, activity, health, or diet noted. No bleeding or increased bruising noted. Took warfarin as prescribed.  Patient denies any change in consumption of greens or or new multivitamins or supplements.  Patient did  a new prescription of Jantoven on 10/14.  Writer increased today and tomorrow's dose to 20 mg then the patient is to resume the maintenance dose.   Patient is in [place] on 10/23 and will recheck the INR. If able she was encouraged to check it sooner.  Writer sent TE to PCP to see if patient should bridge with Lovenox.  Patient denies S/S clotting.        Clinical Outcomes     Negatives:   Major bleeding event, Thromboembolic event, Anticoagulation-related hospital admission, Anticoagulation-related ED visit, Anticoagulation-related fatality    Comments:   No changes in medications, activity, health, or diet noted. No bleeding or increased bruising noted. Took warfarin as prescribed.  Patient denies any change in consumption of greens or or new multivitamins or supplements.  Patient did  a new prescription of Jantoven on 10/14.  Writer increased today and tomorrow's dose to 20 mg then the patient is to resume the maintenance dose.   Patient is in [place] on 10/23 and will recheck the INR. If able she was encouraged to check it sooner.  Writer sent TE to PCP to see if patient should bridge with Lovenox.  Patient denies S/S clotting.           OBJECTIVE    INR   Date Value Ref Range Status   10/18/2019 1.14 0.86 - 1.14 Final       ASSESSMENT / PLAN  INR assessment SUB    Recheck INR In: 5 DAYS    INR Location Outside lab      Anticoagulation Summary  As of 10/18/2019    INR goal:   2.0-3.0   TTR:   46.5 % (4.5 y)   INR used for dosin.14! (10/18/2019)   Warfarin maintenance plan:   10 mg (5 mg x 2)  every day   Full warfarin instructions:   10/18: 20 mg; 10/19: 20 mg; Otherwise 10 mg every day   Weekly warfarin total:   70 mg   Plan last modified:   Reina Forrest RN (8/14/2019)   Next INR check:   10/23/2019   Priority:   INR   Target end date:   Indefinite    Indications    PVD (peripheral vascular disease) with claudication (H) [I73.9]  Long term current use of anticoagulant therapy (Resolved) [Z79.01]  DVT (deep venous thrombosis) (H) [I82.409]  Stroke (H) [I63.9]             Anticoagulation Episode Summary     INR check location:       Preferred lab:       Send INR reminders to:   [place] PHONE [facility]    Comments:   * lab draw due to elevated hematocrit (fingerstick meters don't work). LEFT LE. STENT x 3 LEFT UPPER LEG. Previous arterial clot. AS of 10/9/19 Goal range 2.0-3.0.      Anticoagulation Care Providers     Provider Role Specialty Phone number    Kd Leong MD [facility] [phone]            See the Encounter Report to view Anticoagulation Flowsheet and Dosing Calendar (Go to Encounters tab in chart review, and find the Anticoagulation Therapy Visit)        Reina Forrest RN

## 2019-10-18 NOTE — TELEPHONE ENCOUNTER
10/18/19    Patient's INR to was 1.14 by peripheral draw. Patient denies missing any doses. Writer increased the patient's Coumadin dosing. Should the patient bridge with Lovenox injections until the INR is back to therapeutic level?    Reina Forrest RN, BSN, N  Anticoagulation Clinic   [phone]

## 2019-10-21 NOTE — TELEPHONE ENCOUNTER
No bridging, but it does appear she is fluctuating more than she had.  Perhaps she should come in weekly for the next 6 weeks until she stablizes again?

## 2019-10-21 NOTE — TELEPHONE ENCOUNTER
10/21/19    Noted. Will address this with the patient.    Reina Forrest RN, BSN, PHN  Anticoagulation Clinic   [phone]

## 2019-10-23 ENCOUNTER — ANTICOAGULATION THERAPY VISIT (OUTPATIENT)
Dept: ANTICOAGULATION | Facility: CLINIC | Age: 54
End: 2019-10-23

## 2019-10-23 DIAGNOSIS — I63.9 STROKE (H): ICD-10-CM

## 2019-10-23 DIAGNOSIS — I82.409 DVT (DEEP VENOUS THROMBOSIS) (H): ICD-10-CM

## 2019-10-23 DIAGNOSIS — D75.1 ERYTHROCYTOSIS: ICD-10-CM

## 2019-10-23 DIAGNOSIS — Z86.73 HISTORY OF STROKE: ICD-10-CM

## 2019-10-23 DIAGNOSIS — I73.9 PVD (PERIPHERAL VASCULAR DISEASE) WITH CLAUDICATION (H): ICD-10-CM

## 2019-10-23 LAB
BASOPHILS # BLD AUTO: 0 10E9/L (ref 0–0.2)
BASOPHILS NFR BLD AUTO: 0.5 %
DIFFERENTIAL METHOD BLD: ABNORMAL
EOSINOPHIL # BLD AUTO: 0.1 10E9/L (ref 0–0.7)
EOSINOPHIL NFR BLD AUTO: 1.1 %
ERYTHROCYTE [DISTWIDTH] IN BLOOD BY AUTOMATED COUNT: 19.8 % (ref 10–15)
HCT VFR BLD AUTO: 51.6 % (ref 35–47)
HGB BLD-MCNC: 15.3 G/DL (ref 11.7–15.7)
INR PPP: 1.65 (ref 0.86–1.14)
LYMPHOCYTES # BLD AUTO: 1.5 10E9/L (ref 0.8–5.3)
LYMPHOCYTES NFR BLD AUTO: 22.9 %
MCH RBC QN AUTO: 26.8 PG (ref 26.5–33)
MCHC RBC AUTO-ENTMCNC: 29.7 G/DL (ref 31.5–36.5)
MCV RBC AUTO: 91 FL (ref 78–100)
MONOCYTES # BLD AUTO: 0.5 10E9/L (ref 0–1.3)
MONOCYTES NFR BLD AUTO: 7.6 %
NEUTROPHILS # BLD AUTO: 4.3 10E9/L (ref 1.6–8.3)
NEUTROPHILS NFR BLD AUTO: 67.9 %
PLATELET # BLD AUTO: 108 10E9/L (ref 150–450)
RBC # BLD AUTO: 5.7 10E12/L (ref 3.8–5.2)
WBC # BLD AUTO: 6.3 10E9/L (ref 4–11)

## 2019-10-23 PROCEDURE — 85025 COMPLETE CBC W/AUTO DIFF WBC: CPT | Performed by: INTERNAL MEDICINE

## 2019-10-23 PROCEDURE — 36415 COLL VENOUS BLD VENIPUNCTURE: CPT | Performed by: INTERNAL MEDICINE

## 2019-10-23 PROCEDURE — 99207 ZZC NO CHARGE NURSE ONLY: CPT

## 2019-10-23 PROCEDURE — 85610 PROTHROMBIN TIME: CPT | Performed by: INTERNAL MEDICINE

## 2019-10-23 RX ORDER — WARFARIN SODIUM 5 MG/1
TABLET ORAL
Qty: 180 TABLET | Refills: 0 | COMMUNITY
Start: 2019-10-23 | End: 2019-12-02

## 2019-10-23 NOTE — PROGRESS NOTES
10/23/19  Writer verified dosing instructions with the patient.Recheck the INR at the lab in 1 week.    Reina Forrest RN, BSN, PHN  Anticoagulation Clinic   [phone]        ANTICOAGULATION FOLLOW-UP CLINIC VISIT    Patient Name:  Bia Bill  Date:  10/23/2019  Contact Type:  Telephone/ Left DVM    SUBJECTIVE:  Patient Findings     Positives:   Extra doses    Comments:   Writer left DVM. Requested a call back to [facility].   Patient had 90 mg in the last 7 days. Writer adjusted the maintenance dose to 100 mg weekly for an 11% increase.   Recheck in 1 week.         Clinical Outcomes     Negatives:   Major bleeding event, Thromboembolic event, Anticoagulation-related hospital admission, Anticoagulation-related ED visit, Anticoagulation-related fatality    Comments:   Writer left DVM. Requested a call back to [facility].   Patient had 90 mg in the last 7 days. Writer adjusted the maintenance dose to 100 mg weekly for an 11% increase.   Recheck in 1 week.            OBJECTIVE    INR   Date Value Ref Range Status   10/23/2019 1.65 (H) 0.86 - 1.14 Final       ASSESSMENT / PLAN  INR assessment SUB    Recheck INR In: 1 WEEK    INR Location Outside lab      Anticoagulation Summary  As of 10/23/2019    INR goal:   2.0-3.0   TTR:   46.4 % (4.5 y)   INR used for dosin.65! (10/23/2019)   Warfarin maintenance plan:   12.5 mg (5 mg x 2.5) every Mon, Fri; 15 mg (5 mg x 3) all other days   Full warfarin instructions:   12.5 mg every Mon, Fri; 15 mg all other days   Weekly warfarin total:   100 mg   Plan last modified:   Reina Forrest RN (10/23/2019)   Next INR check:   10/30/2019   Priority:   INR   Target end date:   Indefinite    Indications    PVD (peripheral vascular disease) with claudication (H) [I73.9]  Long term current use of anticoagulant therapy (Resolved) [Z79.01]  DVT (deep venous thrombosis) (H) [I82.409]  Stroke (H) [I63.9]             Anticoagulation Episode Summary     INR check location:        Preferred lab:       Send INR reminders to:   [place] PHONE [facility]    Comments:   * lab draw due to elevated hematocrit (fingerstick meters don't work). LEFT LE. STENT x 3 LEFT UPPER LEG. Previous arterial clot. AS of 10/9/19 Goal range 2.0-3.0.      Anticoagulation Care Providers     Provider Role Specialty Phone number    Kd Leong MD [facility] [phone]            See the Encounter Report to view Anticoagulation Flowsheet and Dosing Calendar (Go to Encounters tab in chart review, and find the Anticoagulation Therapy Visit)        Reina Forrest RN

## 2019-10-28 ENCOUNTER — APPOINTMENT (OUTPATIENT)
Dept: GENERAL RADIOLOGY | Facility: CLINIC | Age: 54
End: 2019-10-28
Attending: FAMILY MEDICINE
Payer: COMMERCIAL

## 2019-10-28 ENCOUNTER — HOSPITAL ENCOUNTER (INPATIENT)
Facility: CLINIC | Age: 54
LOS: 2 days | Discharge: HOME | End: 2019-10-31
Attending: FAMILY MEDICINE | Admitting: FAMILY MEDICINE
Payer: COMMERCIAL

## 2019-10-28 ENCOUNTER — NURSE TRIAGE (OUTPATIENT)
Dept: NURSING | Facility: CLINIC | Age: 54
End: 2019-10-28

## 2019-10-28 DIAGNOSIS — J44.1 COPD EXACERBATION (H): ICD-10-CM

## 2019-10-28 DIAGNOSIS — J96.01 ACUTE RESPIRATORY FAILURE WITH HYPOXIA (H): ICD-10-CM

## 2019-10-28 DIAGNOSIS — F17.210 CIGARETTE SMOKER: ICD-10-CM

## 2019-10-28 LAB
ALBUMIN SERPL-MCNC: 3.2 G/DL (ref 3.4–5)
ALP SERPL-CCNC: 114 U/L (ref 40–150)
ALT SERPL W P-5'-P-CCNC: 36 U/L (ref 0–50)
ANION GAP SERPL CALCULATED.3IONS-SCNC: 6 MMOL/L (ref 3–14)
AST SERPL W P-5'-P-CCNC: 21 U/L (ref 0–45)
BASE DEFICIT BLDV-SCNC: 1.4 MMOL/L
BASOPHILS # BLD AUTO: 0 10E9/L (ref 0–0.2)
BASOPHILS NFR BLD AUTO: 0.4 %
BILIRUB SERPL-MCNC: 0.7 MG/DL (ref 0.2–1.3)
BUN SERPL-MCNC: 11 MG/DL (ref 7–30)
CALCIUM SERPL-MCNC: 8.1 MG/DL (ref 8.5–10.1)
CHLORIDE SERPL-SCNC: 102 MMOL/L (ref 94–109)
CO2 SERPL-SCNC: 32 MMOL/L (ref 20–32)
CREAT SERPL-MCNC: 0.63 MG/DL (ref 0.52–1.04)
DIFFERENTIAL METHOD BLD: ABNORMAL
EOSINOPHIL # BLD AUTO: 0.1 10E9/L (ref 0–0.7)
EOSINOPHIL NFR BLD AUTO: 1.2 %
ERYTHROCYTE [DISTWIDTH] IN BLOOD BY AUTOMATED COUNT: 18.9 % (ref 10–15)
GFR SERPL CREATININE-BSD FRML MDRD: >90 ML/MIN/{1.73_M2}
GLUCOSE SERPL-MCNC: 139 MG/DL (ref 70–99)
HCO3 BLDV-SCNC: 27 MMOL/L (ref 21–28)
HCT VFR BLD AUTO: 46.5 % (ref 35–47)
HGB BLD-MCNC: 13.5 G/DL (ref 11.7–15.7)
IMM GRANULOCYTES # BLD: 0 10E9/L (ref 0–0.4)
IMM GRANULOCYTES NFR BLD: 0.5 %
LACTATE BLD-SCNC: 1.8 MMOL/L (ref 0.7–2)
LYMPHOCYTES # BLD AUTO: 1.1 10E9/L (ref 0.8–5.3)
LYMPHOCYTES NFR BLD AUTO: 13.4 %
MCH RBC QN AUTO: 26.6 PG (ref 26.5–33)
MCHC RBC AUTO-ENTMCNC: 29 G/DL (ref 31.5–36.5)
MCV RBC AUTO: 92 FL (ref 78–100)
MONOCYTES # BLD AUTO: 0.6 10E9/L (ref 0–1.3)
MONOCYTES NFR BLD AUTO: 7.4 %
NEUTROPHILS # BLD AUTO: 6.3 10E9/L (ref 1.6–8.3)
NEUTROPHILS NFR BLD AUTO: 77.1 %
NRBC # BLD AUTO: 0 10*3/UL
NRBC BLD AUTO-RTO: 0 /100
NT-PROBNP SERPL-MCNC: 735 PG/ML (ref 0–900)
O2/TOTAL GAS SETTING VFR VENT: ABNORMAL %
PCO2 BLDV: 60 MM HG (ref 40–50)
PH BLDV: 7.26 PH (ref 7.32–7.43)
PLATELET # BLD AUTO: 128 10E9/L (ref 150–450)
PO2 BLDV: 42 MM HG (ref 25–47)
POTASSIUM SERPL-SCNC: 3.2 MMOL/L (ref 3.4–5.3)
PROT SERPL-MCNC: 6.2 G/DL (ref 6.8–8.8)
RBC # BLD AUTO: 5.07 10E12/L (ref 3.8–5.2)
SODIUM SERPL-SCNC: 140 MMOL/L (ref 133–144)
TROPONIN I SERPL-MCNC: <0.015 UG/L (ref 0–0.04)
WBC # BLD AUTO: 8.1 10E9/L (ref 4–11)

## 2019-10-28 PROCEDURE — 84484 ASSAY OF TROPONIN QUANT: CPT | Performed by: FAMILY MEDICINE

## 2019-10-28 PROCEDURE — 94640 AIRWAY INHALATION TREATMENT: CPT | Performed by: FAMILY MEDICINE

## 2019-10-28 PROCEDURE — 40000809 ZZH STATISTIC NO DOCUMENTATION TO SUPPORT CHARGE: Performed by: FAMILY MEDICINE

## 2019-10-28 PROCEDURE — 40000275 ZZH STATISTIC RCP TIME EA 10 MIN

## 2019-10-28 PROCEDURE — 93010 ELECTROCARDIOGRAM REPORT: CPT | Mod: Z6 | Performed by: FAMILY MEDICINE

## 2019-10-28 PROCEDURE — 93005 ELECTROCARDIOGRAM TRACING: CPT | Performed by: FAMILY MEDICINE

## 2019-10-28 PROCEDURE — 83605 ASSAY OF LACTIC ACID: CPT | Performed by: FAMILY MEDICINE

## 2019-10-28 PROCEDURE — 94660 CPAP INITIATION&MGMT: CPT

## 2019-10-28 PROCEDURE — 25000128 H RX IP 250 OP 636: Performed by: FAMILY MEDICINE

## 2019-10-28 PROCEDURE — 71046 X-RAY EXAM CHEST 2 VIEWS: CPT

## 2019-10-28 PROCEDURE — 82803 BLOOD GASES ANY COMBINATION: CPT | Performed by: FAMILY MEDICINE

## 2019-10-28 PROCEDURE — 94640 AIRWAY INHALATION TREATMENT: CPT

## 2019-10-28 PROCEDURE — 99285 EMERGENCY DEPT VISIT HI MDM: CPT | Mod: 25 | Performed by: FAMILY MEDICINE

## 2019-10-28 PROCEDURE — 40000270 ZZH STATISTIC OXYGEN  O2DAILY TECH TIME

## 2019-10-28 PROCEDURE — 94640 AIRWAY INHALATION TREATMENT: CPT | Mod: 76

## 2019-10-28 PROCEDURE — 83880 ASSAY OF NATRIURETIC PEPTIDE: CPT | Performed by: FAMILY MEDICINE

## 2019-10-28 PROCEDURE — 85025 COMPLETE CBC W/AUTO DIFF WBC: CPT | Performed by: FAMILY MEDICINE

## 2019-10-28 PROCEDURE — 80053 COMPREHEN METABOLIC PANEL: CPT | Performed by: FAMILY MEDICINE

## 2019-10-28 PROCEDURE — 25000125 ZZHC RX 250: Performed by: FAMILY MEDICINE

## 2019-10-28 RX ORDER — ALBUTEROL SULFATE 0.83 MG/ML
2.5 SOLUTION RESPIRATORY (INHALATION) ONCE
Status: COMPLETED | OUTPATIENT
Start: 2019-10-28 | End: 2019-10-28

## 2019-10-28 RX ADMIN — ALBUTEROL SULFATE 2.5 MG: 2.5 SOLUTION RESPIRATORY (INHALATION) at 21:16

## 2019-10-28 RX ADMIN — SODIUM CHLORIDE 500 ML: 9 INJECTION, SOLUTION INTRAVENOUS at 21:33

## 2019-10-28 ASSESSMENT — MIFFLIN-ST. JEOR: SCORE: 1527.15

## 2019-10-29 PROBLEM — J96.02 ACUTE RESPIRATORY FAILURE WITH HYPOXIA AND HYPERCAPNIA (H): Status: ACTIVE | Noted: 2019-10-29

## 2019-10-29 PROBLEM — Z86.718 PERSONAL HISTORY OF DVT (DEEP VEIN THROMBOSIS): Status: ACTIVE | Noted: 2019-10-29

## 2019-10-29 PROBLEM — J96.01 ACUTE RESPIRATORY FAILURE WITH HYPOXIA AND HYPERCAPNIA (H): Status: ACTIVE | Noted: 2019-10-29

## 2019-10-29 LAB
ALBUMIN SERPL-MCNC: 3.2 G/DL (ref 3.4–5)
ALP SERPL-CCNC: 118 U/L (ref 40–150)
ALT SERPL W P-5'-P-CCNC: 42 U/L (ref 0–50)
ANION GAP SERPL CALCULATED.3IONS-SCNC: 3 MMOL/L (ref 3–14)
AST SERPL W P-5'-P-CCNC: 21 U/L (ref 0–45)
BASE EXCESS BLDV CALC-SCNC: 4.1 MMOL/L
BASE EXCESS BLDV CALC-SCNC: 4.6 MMOL/L
BASE EXCESS BLDV CALC-SCNC: 5.6 MMOL/L
BILIRUB SERPL-MCNC: 0.5 MG/DL (ref 0.2–1.3)
BUN SERPL-MCNC: 13 MG/DL (ref 7–30)
CALCIUM SERPL-MCNC: 8.5 MG/DL (ref 8.5–10.1)
CHLORIDE SERPL-SCNC: 101 MMOL/L (ref 94–109)
CO2 SERPL-SCNC: 32 MMOL/L (ref 20–32)
CREAT SERPL-MCNC: 0.69 MG/DL (ref 0.52–1.04)
ERYTHROCYTE [DISTWIDTH] IN BLOOD BY AUTOMATED COUNT: 18.8 % (ref 10–15)
GFR SERPL CREATININE-BSD FRML MDRD: >90 ML/MIN/{1.73_M2}
GLUCOSE BLDC GLUCOMTR-MCNC: 180 MG/DL (ref 70–99)
GLUCOSE BLDC GLUCOMTR-MCNC: 185 MG/DL (ref 70–99)
GLUCOSE BLDC GLUCOMTR-MCNC: 231 MG/DL (ref 70–99)
GLUCOSE BLDC GLUCOMTR-MCNC: 279 MG/DL (ref 70–99)
GLUCOSE SERPL-MCNC: 193 MG/DL (ref 70–99)
HBA1C MFR BLD: 6.1 % (ref 0–5.6)
HCO3 BLDV-SCNC: 32 MMOL/L (ref 21–28)
HCO3 BLDV-SCNC: 33 MMOL/L (ref 21–28)
HCO3 BLDV-SCNC: 34 MMOL/L (ref 21–28)
HCT VFR BLD AUTO: 48.5 % (ref 35–47)
HGB BLD-MCNC: 13.6 G/DL (ref 11.7–15.7)
INR PPP: 3.84 (ref 0.86–1.14)
MCH RBC QN AUTO: 26 PG (ref 26.5–33)
MCHC RBC AUTO-ENTMCNC: 28 G/DL (ref 31.5–36.5)
MCV RBC AUTO: 93 FL (ref 78–100)
MRSA DNA SPEC QL NAA+PROBE: NEGATIVE
O2/TOTAL GAS SETTING VFR VENT: 32 %
O2/TOTAL GAS SETTING VFR VENT: ABNORMAL %
O2/TOTAL GAS SETTING VFR VENT: ABNORMAL %
PCO2 BLDV: 63 MM HG (ref 40–50)
PCO2 BLDV: 66 MM HG (ref 40–50)
PCO2 BLDV: 68 MM HG (ref 40–50)
PH BLDV: 7.3 PH (ref 7.32–7.43)
PH BLDV: 7.32 PH (ref 7.32–7.43)
PH BLDV: 7.32 PH (ref 7.32–7.43)
PLATELET # BLD AUTO: 116 10E9/L (ref 150–450)
PO2 BLDV: 41 MM HG (ref 25–47)
PO2 BLDV: 44 MM HG (ref 25–47)
PO2 BLDV: 44 MM HG (ref 25–47)
POTASSIUM SERPL-SCNC: 4.1 MMOL/L (ref 3.4–5.3)
PROT SERPL-MCNC: 6.5 G/DL (ref 6.8–8.8)
RBC # BLD AUTO: 5.23 10E12/L (ref 3.8–5.2)
SODIUM SERPL-SCNC: 136 MMOL/L (ref 133–144)
SPECIMEN SOURCE: NORMAL
WBC # BLD AUTO: 7.5 10E9/L (ref 4–11)

## 2019-10-29 PROCEDURE — 25000132 ZZH RX MED GY IP 250 OP 250 PS 637: Performed by: PHYSICIAN ASSISTANT

## 2019-10-29 PROCEDURE — 40000275 ZZH STATISTIC RCP TIME EA 10 MIN

## 2019-10-29 PROCEDURE — 94640 AIRWAY INHALATION TREATMENT: CPT

## 2019-10-29 PROCEDURE — 36415 COLL VENOUS BLD VENIPUNCTURE: CPT | Performed by: FAMILY MEDICINE

## 2019-10-29 PROCEDURE — 82803 BLOOD GASES ANY COMBINATION: CPT | Performed by: PHYSICIAN ASSISTANT

## 2019-10-29 PROCEDURE — 80053 COMPREHEN METABOLIC PANEL: CPT | Performed by: PHYSICIAN ASSISTANT

## 2019-10-29 PROCEDURE — 87631 RESP VIRUS 3-5 TARGETS: CPT | Performed by: FAMILY MEDICINE

## 2019-10-29 PROCEDURE — 87641 MR-STAPH DNA AMP PROBE: CPT | Performed by: FAMILY MEDICINE

## 2019-10-29 PROCEDURE — 40000270 ZZH STATISTIC OXYGEN  O2DAILY TECH TIME

## 2019-10-29 PROCEDURE — 25000132 ZZH RX MED GY IP 250 OP 250 PS 637: Performed by: FAMILY MEDICINE

## 2019-10-29 PROCEDURE — 99222 1ST HOSP IP/OBS MODERATE 55: CPT | Mod: AI | Performed by: FAMILY MEDICINE

## 2019-10-29 PROCEDURE — 82803 BLOOD GASES ANY COMBINATION: CPT | Performed by: FAMILY MEDICINE

## 2019-10-29 PROCEDURE — 25000128 H RX IP 250 OP 636: Performed by: PHYSICIAN ASSISTANT

## 2019-10-29 PROCEDURE — 25800030 ZZH RX IP 258 OP 636: Performed by: PHYSICIAN ASSISTANT

## 2019-10-29 PROCEDURE — 99207 ZZC APP CREDIT; MD BILLING SHARED VISIT: CPT | Performed by: PHYSICIAN ASSISTANT

## 2019-10-29 PROCEDURE — 94640 AIRWAY INHALATION TREATMENT: CPT | Mod: 76

## 2019-10-29 PROCEDURE — 87640 STAPH A DNA AMP PROBE: CPT | Performed by: FAMILY MEDICINE

## 2019-10-29 PROCEDURE — 25000128 H RX IP 250 OP 636: Performed by: FAMILY MEDICINE

## 2019-10-29 PROCEDURE — 25000125 ZZHC RX 250: Performed by: FAMILY MEDICINE

## 2019-10-29 PROCEDURE — 00000146 ZZHCL STATISTIC GLUCOSE BY METER IP

## 2019-10-29 PROCEDURE — 85610 PROTHROMBIN TIME: CPT | Performed by: FAMILY MEDICINE

## 2019-10-29 PROCEDURE — 83036 HEMOGLOBIN GLYCOSYLATED A1C: CPT | Performed by: PHYSICIAN ASSISTANT

## 2019-10-29 PROCEDURE — 36415 COLL VENOUS BLD VENIPUNCTURE: CPT | Performed by: PHYSICIAN ASSISTANT

## 2019-10-29 PROCEDURE — 20000003 ZZH R&B ICU

## 2019-10-29 PROCEDURE — 87633 RESP VIRUS 12-25 TARGETS: CPT | Performed by: FAMILY MEDICINE

## 2019-10-29 PROCEDURE — 94660 CPAP INITIATION&MGMT: CPT

## 2019-10-29 PROCEDURE — 85027 COMPLETE CBC AUTOMATED: CPT | Performed by: PHYSICIAN ASSISTANT

## 2019-10-29 PROCEDURE — 25000125 ZZHC RX 250: Performed by: PHYSICIAN ASSISTANT

## 2019-10-29 PROCEDURE — 25000131 ZZH RX MED GY IP 250 OP 636 PS 637: Performed by: PHYSICIAN ASSISTANT

## 2019-10-29 RX ORDER — POTASSIUM CL/LIDO/0.9 % NACL 10MEQ/0.1L
10 INTRAVENOUS SOLUTION, PIGGYBACK (ML) INTRAVENOUS
Status: DISCONTINUED | OUTPATIENT
Start: 2019-10-29 | End: 2019-10-31 | Stop reason: HOSPADM

## 2019-10-29 RX ORDER — SIMVASTATIN 20 MG
20 TABLET ORAL AT BEDTIME
Status: DISCONTINUED | OUTPATIENT
Start: 2019-10-29 | End: 2019-10-31 | Stop reason: HOSPADM

## 2019-10-29 RX ORDER — FUROSEMIDE 10 MG/ML
40 INJECTION INTRAMUSCULAR; INTRAVENOUS 2 TIMES DAILY
Status: DISCONTINUED | OUTPATIENT
Start: 2019-10-29 | End: 2019-10-31

## 2019-10-29 RX ORDER — POTASSIUM CHLORIDE 1.5 G/1.58G
20-40 POWDER, FOR SOLUTION ORAL
Status: DISCONTINUED | OUTPATIENT
Start: 2019-10-29 | End: 2019-10-31 | Stop reason: HOSPADM

## 2019-10-29 RX ORDER — GABAPENTIN 300 MG/1
300 CAPSULE ORAL AT BEDTIME
Status: DISCONTINUED | OUTPATIENT
Start: 2019-10-29 | End: 2019-10-31 | Stop reason: HOSPADM

## 2019-10-29 RX ORDER — LEVOFLOXACIN 5 MG/ML
500 INJECTION, SOLUTION INTRAVENOUS EVERY 24 HOURS
Status: DISCONTINUED | OUTPATIENT
Start: 2019-10-29 | End: 2019-10-29

## 2019-10-29 RX ORDER — DEXTROSE MONOHYDRATE 25 G/50ML
25-50 INJECTION, SOLUTION INTRAVENOUS
Status: DISCONTINUED | OUTPATIENT
Start: 2019-10-29 | End: 2019-10-31 | Stop reason: HOSPADM

## 2019-10-29 RX ORDER — METHYLPREDNISOLONE SODIUM SUCCINATE 125 MG/2ML
60 INJECTION, POWDER, LYOPHILIZED, FOR SOLUTION INTRAMUSCULAR; INTRAVENOUS EVERY 6 HOURS
Status: DISCONTINUED | OUTPATIENT
Start: 2019-10-29 | End: 2019-10-29

## 2019-10-29 RX ORDER — ONDANSETRON 4 MG/1
4 TABLET, ORALLY DISINTEGRATING ORAL EVERY 6 HOURS PRN
Status: DISCONTINUED | OUTPATIENT
Start: 2019-10-29 | End: 2019-10-31 | Stop reason: HOSPADM

## 2019-10-29 RX ORDER — NICOTINE 21 MG/24HR
1 PATCH, TRANSDERMAL 24 HOURS TRANSDERMAL DAILY
Status: DISCONTINUED | OUTPATIENT
Start: 2019-10-29 | End: 2019-10-31 | Stop reason: HOSPADM

## 2019-10-29 RX ORDER — SODIUM CHLORIDE, SODIUM LACTATE, POTASSIUM CHLORIDE, CALCIUM CHLORIDE 600; 310; 30; 20 MG/100ML; MG/100ML; MG/100ML; MG/100ML
INJECTION, SOLUTION INTRAVENOUS CONTINUOUS
Status: DISCONTINUED | OUTPATIENT
Start: 2019-10-29 | End: 2019-10-29

## 2019-10-29 RX ORDER — ARIPIPRAZOLE 10 MG/1
10 TABLET ORAL EVERY EVENING
Status: DISCONTINUED | OUTPATIENT
Start: 2019-10-29 | End: 2019-10-31 | Stop reason: HOSPADM

## 2019-10-29 RX ORDER — ALBUTEROL SULFATE 0.83 MG/ML
2.5 SOLUTION RESPIRATORY (INHALATION)
Status: DISCONTINUED | OUTPATIENT
Start: 2019-10-29 | End: 2019-10-29

## 2019-10-29 RX ORDER — ALBUTEROL SULFATE 0.83 MG/ML
2.5 SOLUTION RESPIRATORY (INHALATION)
Status: DISCONTINUED | OUTPATIENT
Start: 2019-10-29 | End: 2019-10-31 | Stop reason: HOSPADM

## 2019-10-29 RX ORDER — POTASSIUM CHLORIDE 1500 MG/1
20-40 TABLET, EXTENDED RELEASE ORAL
Status: DISCONTINUED | OUTPATIENT
Start: 2019-10-29 | End: 2019-10-31 | Stop reason: HOSPADM

## 2019-10-29 RX ORDER — PROCHLORPERAZINE MALEATE 10 MG
10 TABLET ORAL EVERY 6 HOURS PRN
Status: DISCONTINUED | OUTPATIENT
Start: 2019-10-29 | End: 2019-10-31 | Stop reason: HOSPADM

## 2019-10-29 RX ORDER — FUROSEMIDE 40 MG
40 TABLET ORAL
Status: DISCONTINUED | OUTPATIENT
Start: 2019-10-29 | End: 2019-10-29

## 2019-10-29 RX ORDER — IPRATROPIUM BROMIDE AND ALBUTEROL SULFATE 2.5; .5 MG/3ML; MG/3ML
3 SOLUTION RESPIRATORY (INHALATION)
Status: DISCONTINUED | OUTPATIENT
Start: 2019-10-29 | End: 2019-10-29

## 2019-10-29 RX ORDER — PROCHLORPERAZINE 25 MG
25 SUPPOSITORY, RECTAL RECTAL EVERY 12 HOURS PRN
Status: DISCONTINUED | OUTPATIENT
Start: 2019-10-29 | End: 2019-10-31 | Stop reason: HOSPADM

## 2019-10-29 RX ORDER — PRAMIPEXOLE DIHYDROCHLORIDE 0.12 MG/1
0.12 TABLET ORAL AT BEDTIME
Status: DISCONTINUED | OUTPATIENT
Start: 2019-10-29 | End: 2019-10-31 | Stop reason: HOSPADM

## 2019-10-29 RX ORDER — LAMOTRIGINE 100 MG/1
100 TABLET ORAL DAILY
Status: DISCONTINUED | OUTPATIENT
Start: 2019-10-29 | End: 2019-10-31 | Stop reason: HOSPADM

## 2019-10-29 RX ORDER — POTASSIUM CHLORIDE 29.8 MG/ML
20 INJECTION INTRAVENOUS
Status: DISCONTINUED | OUTPATIENT
Start: 2019-10-29 | End: 2019-10-29 | Stop reason: CLARIF

## 2019-10-29 RX ORDER — ONDANSETRON 2 MG/ML
4 INJECTION INTRAMUSCULAR; INTRAVENOUS EVERY 6 HOURS PRN
Status: DISCONTINUED | OUTPATIENT
Start: 2019-10-29 | End: 2019-10-31 | Stop reason: HOSPADM

## 2019-10-29 RX ORDER — POTASSIUM CHLORIDE 750 MG/1
10 TABLET, EXTENDED RELEASE ORAL DAILY
Status: DISCONTINUED | OUTPATIENT
Start: 2019-10-29 | End: 2019-10-31 | Stop reason: HOSPADM

## 2019-10-29 RX ORDER — ALBUTEROL SULFATE 90 UG/1
2 AEROSOL, METERED RESPIRATORY (INHALATION) EVERY 6 HOURS PRN
Status: DISCONTINUED | OUTPATIENT
Start: 2019-10-29 | End: 2019-10-31 | Stop reason: HOSPADM

## 2019-10-29 RX ORDER — LEVOTHYROXINE SODIUM 75 UG/1
150 TABLET ORAL DAILY
Status: DISCONTINUED | OUTPATIENT
Start: 2019-10-29 | End: 2019-10-31 | Stop reason: HOSPADM

## 2019-10-29 RX ORDER — POTASSIUM CHLORIDE 7.45 MG/ML
10 INJECTION INTRAVENOUS
Status: DISCONTINUED | OUTPATIENT
Start: 2019-10-29 | End: 2019-10-31 | Stop reason: HOSPADM

## 2019-10-29 RX ORDER — IPRATROPIUM BROMIDE AND ALBUTEROL SULFATE 2.5; .5 MG/3ML; MG/3ML
1 SOLUTION RESPIRATORY (INHALATION)
Status: DISCONTINUED | OUTPATIENT
Start: 2019-10-29 | End: 2019-10-31 | Stop reason: HOSPADM

## 2019-10-29 RX ORDER — NICOTINE POLACRILEX 4 MG
15-30 LOZENGE BUCCAL
Status: DISCONTINUED | OUTPATIENT
Start: 2019-10-29 | End: 2019-10-31 | Stop reason: HOSPADM

## 2019-10-29 RX ORDER — ALLOPURINOL 300 MG/1
300 TABLET ORAL DAILY
Status: DISCONTINUED | OUTPATIENT
Start: 2019-10-29 | End: 2019-10-31 | Stop reason: HOSPADM

## 2019-10-29 RX ORDER — BUPROPION HYDROCHLORIDE 150 MG/1
150 TABLET ORAL EVERY EVENING
Status: DISCONTINUED | OUTPATIENT
Start: 2019-10-29 | End: 2019-10-31 | Stop reason: HOSPADM

## 2019-10-29 RX ORDER — NALOXONE HYDROCHLORIDE 0.4 MG/ML
.1-.4 INJECTION, SOLUTION INTRAMUSCULAR; INTRAVENOUS; SUBCUTANEOUS
Status: DISCONTINUED | OUTPATIENT
Start: 2019-10-29 | End: 2019-10-31 | Stop reason: HOSPADM

## 2019-10-29 RX ORDER — ACETAMINOPHEN 500 MG
500-1000 TABLET ORAL EVERY 6 HOURS PRN
Status: DISCONTINUED | OUTPATIENT
Start: 2019-10-29 | End: 2019-10-31 | Stop reason: HOSPADM

## 2019-10-29 RX ORDER — NALOXONE HYDROCHLORIDE 0.4 MG/ML
.1-.4 INJECTION, SOLUTION INTRAMUSCULAR; INTRAVENOUS; SUBCUTANEOUS
Status: DISCONTINUED | OUTPATIENT
Start: 2019-10-29 | End: 2019-10-29

## 2019-10-29 RX ORDER — GABAPENTIN 600 MG/1
600 TABLET ORAL 3 TIMES DAILY
Status: DISCONTINUED | OUTPATIENT
Start: 2019-10-29 | End: 2019-10-31 | Stop reason: HOSPADM

## 2019-10-29 RX ADMIN — FLUTICASONE FUROATE AND VILANTEROL TRIFENATATE 1 PUFF: 200; 25 POWDER RESPIRATORY (INHALATION) at 07:57

## 2019-10-29 RX ADMIN — ARIPIPRAZOLE 10 MG: 10 TABLET ORAL at 17:27

## 2019-10-29 RX ADMIN — TRAZODONE HYDROCHLORIDE 150 MG: 50 TABLET ORAL at 21:09

## 2019-10-29 RX ADMIN — GABAPENTIN 300 MG: 300 CAPSULE ORAL at 21:09

## 2019-10-29 RX ADMIN — GABAPENTIN 600 MG: 600 TABLET, FILM COATED ORAL at 13:02

## 2019-10-29 RX ADMIN — AZITHROMYCIN MONOHYDRATE 500 MG: 500 INJECTION, POWDER, LYOPHILIZED, FOR SOLUTION INTRAVENOUS at 11:08

## 2019-10-29 RX ADMIN — IPRATROPIUM BROMIDE AND ALBUTEROL SULFATE 3 ML: .5; 3 SOLUTION RESPIRATORY (INHALATION) at 19:32

## 2019-10-29 RX ADMIN — INSULIN ASPART 3 UNITS: 100 INJECTION, SOLUTION INTRAVENOUS; SUBCUTANEOUS at 12:51

## 2019-10-29 RX ADMIN — LAMOTRIGINE 100 MG: 100 TABLET ORAL at 07:57

## 2019-10-29 RX ADMIN — PRAMIPEXOLE DIHYDROCHLORIDE 0.12 MG: 0.12 TABLET ORAL at 21:10

## 2019-10-29 RX ADMIN — GABAPENTIN 600 MG: 600 TABLET, FILM COATED ORAL at 21:09

## 2019-10-29 RX ADMIN — POTASSIUM CHLORIDE 10 MEQ: 750 TABLET, FILM COATED, EXTENDED RELEASE ORAL at 02:30

## 2019-10-29 RX ADMIN — FUROSEMIDE 40 MG: 10 INJECTION, SOLUTION INTRAMUSCULAR; INTRAVENOUS at 11:08

## 2019-10-29 RX ADMIN — ALLOPURINOL 300 MG: 300 TABLET ORAL at 07:57

## 2019-10-29 RX ADMIN — ACETAMINOPHEN 500 MG: 500 TABLET, FILM COATED ORAL at 06:07

## 2019-10-29 RX ADMIN — INSULIN ASPART 1 UNITS: 100 INJECTION, SOLUTION INTRAVENOUS; SUBCUTANEOUS at 17:27

## 2019-10-29 RX ADMIN — LEVOTHYROXINE SODIUM 150 MCG: 75 TABLET ORAL at 06:07

## 2019-10-29 RX ADMIN — FUROSEMIDE 40 MG: 10 INJECTION, SOLUTION INTRAMUSCULAR; INTRAVENOUS at 17:28

## 2019-10-29 RX ADMIN — POTASSIUM CHLORIDE 10 MEQ: 750 TABLET, FILM COATED, EXTENDED RELEASE ORAL at 07:57

## 2019-10-29 RX ADMIN — METHYLPREDNISOLONE SODIUM SUCCINATE 62.5 MG: 125 INJECTION, POWDER, FOR SOLUTION INTRAMUSCULAR; INTRAVENOUS at 02:30

## 2019-10-29 RX ADMIN — ACETAMINOPHEN 1000 MG: 500 TABLET, FILM COATED ORAL at 21:09

## 2019-10-29 RX ADMIN — SIMVASTATIN 20 MG: 20 TABLET, FILM COATED ORAL at 21:09

## 2019-10-29 RX ADMIN — FUROSEMIDE 40 MG: 40 TABLET ORAL at 07:57

## 2019-10-29 RX ADMIN — IPRATROPIUM BROMIDE AND ALBUTEROL SULFATE 3 ML: .5; 3 SOLUTION RESPIRATORY (INHALATION) at 11:07

## 2019-10-29 RX ADMIN — PRAMIPEXOLE DIHYDROCHLORIDE 0.12 MG: 0.12 TABLET ORAL at 02:30

## 2019-10-29 RX ADMIN — METHYLPREDNISOLONE SODIUM SUCCINATE 62.5 MG: 125 INJECTION, POWDER, FOR SOLUTION INTRAMUSCULAR; INTRAVENOUS at 07:57

## 2019-10-29 RX ADMIN — IPRATROPIUM BROMIDE AND ALBUTEROL SULFATE 3 ML: .5; 3 SOLUTION RESPIRATORY (INHALATION) at 16:31

## 2019-10-29 RX ADMIN — IPRATROPIUM BROMIDE AND ALBUTEROL SULFATE 3 ML: .5; 3 SOLUTION RESPIRATORY (INHALATION) at 05:51

## 2019-10-29 RX ADMIN — OMEPRAZOLE 20 MG: 20 CAPSULE, DELAYED RELEASE ORAL at 06:07

## 2019-10-29 RX ADMIN — SIMVASTATIN 20 MG: 20 TABLET, FILM COATED ORAL at 02:30

## 2019-10-29 RX ADMIN — BUPROPION HYDROCHLORIDE 150 MG: 150 TABLET, FILM COATED, EXTENDED RELEASE ORAL at 17:27

## 2019-10-29 RX ADMIN — LEVOFLOXACIN 500 MG: 5 INJECTION, SOLUTION INTRAVENOUS at 02:32

## 2019-10-29 ASSESSMENT — ACTIVITIES OF DAILY LIVING (ADL)
TRANSFERRING: 0-->INDEPENDENT
DRESS: 0-->INDEPENDENT
ADLS_ACUITY_SCORE: 20
BATHING: 0-->INDEPENDENT
RETIRED_COMMUNICATION: 0-->UNDERSTANDS/COMMUNICATES WITHOUT DIFFICULTY
ADLS_ACUITY_SCORE: 20
AMBULATION: 0-->INDEPENDENT
TOILETING: 0-->INDEPENDENT
ADLS_ACUITY_SCORE: 11
RETIRED_EATING: 0-->INDEPENDENT
ADLS_ACUITY_SCORE: 16
FALL_HISTORY_WITHIN_LAST_SIX_MONTHS: NO
ADLS_ACUITY_SCORE: 20
COGNITION: 0 - NO COGNITION ISSUES REPORTED
SWALLOWING: 0-->SWALLOWS FOODS/LIQUIDS WITHOUT DIFFICULTY

## 2019-10-29 ASSESSMENT — MIFFLIN-ST. JEOR: SCORE: 1593.88

## 2019-10-29 NOTE — PROGRESS NOTES
1:38 AM   I briefly saw this pt.  She has copd and presented with cough, coryza, wheezing and significant difficulty breathing.  Her chest x-ray suggested possible chf but the bnp was normal and the clinical course suggested copd exacerbation possibly precipitated by viral illness.    She is currently on bipap,   Ph 7.3/ CO2 68  She is sleepy but fully arouses, answers questions appropriately and speaks easily.    She was given solumedrol in route.  I ordered some on going iv steroids, nebs, repeat cxr and repeat labs for AM.  I will also order empiric levaquin iv.    I reviewed her home meds.  I ordered a resp virus panel.     She is on coumadin --apparently due to previous dvt. I order it.    I stopped iv fluids.

## 2019-10-29 NOTE — TELEPHONE ENCOUNTER
Bia reports she is short of breath, worse than she's experienced in the past.    She has a history of asthma.  She has used her inhaler 3 times today without improvement.    Per protocol, ER advised.  Patient reports she would be more comfortable calling 911.    Oma Eid RN  [facility]      Reason for Disposition    [1] MODERATE difficulty breathing (e.g., speaks in phrases, SOB even at rest, pulse 100-120) AND [2] NEW-onset or WORSE than normal    Additional Information    Negative: [1] Breathing stopped AND [2] hasn't returned    Negative: Choking on something    Negative: Severe difficulty breathing (e.g., struggling for each breath, speaks in single words)    Negative: Bluish (or gray) lips or face now    Negative: Difficult to awaken or acting confused (e.g., disoriented, slurred speech)    Negative: Passed out (i.e., lost consciousness, collapsed and was not responding)    Negative: Wheezing started suddenly after medicine, an allergic food or bee sting    Negative: Stridor    Negative: Slow, shallow and weak breathing    Negative: Sounds like a life-threatening emergency to the triager    Protocols used: BREATHING DIFFICULTY-A-AH

## 2019-10-29 NOTE — PROGRESS NOTES
"[place] [facility] ADMISSION NOTE    Patient admitted to room 1001 at approximately 0113 via cart from emergency room. Patient was accompanied by nurse.     Verbal SBAR report received from Malaika prior to patient arrival.     Patient trasferred to bed via air tamika. Patient alert and oriented X 2. The patient is not having any pain.  . Admission vital signs: Blood pressure 119/56, pulse 80, temperature 98.6  F (37  C), temperature source Oral, resp. rate 30, height 1.448 m (4' 9\"), weight 104.3 kg (230 lb), last menstrual period 09/27/2009, SpO2 93 %, not currently breastfeeding. Patient was oriented to plan of care, call light, bed controls, tv, telephone, bathroom and visiting hours.     Risk Assessment    The following safety risks were identified during admission: fall. Yellow risk band applied: YES.     Skin Initial Assessment    This writer admitted this patient and completed a full skin assessment and Brandon score in the Adult PCS flowsheet. Appropriate interventions initiated as needed.     Secondary skin check completed by Brenna MALDONADO RN.         Education    Patient has a [place] to Observation order: No  Observation education completed and documented: N/A      Janice Baez RN    "

## 2019-10-29 NOTE — ED NOTES
VBG ordered due to confusion about date and time. Attempted nasal cannula but could not maintain sats-back on oxymask. Janice Baez RN on 10/28/2019 at 10:45 PM

## 2019-10-29 NOTE — PHARMACY-ANTICOAGULATION SERVICE
Clinical Pharmacy - Warfarin Dosing Consult     Pharmacy has been consulted to manage this patient s warfarin therapy.  Indication: DVT/PE Prophylaxis;PVD  Therapy Goal: INR 2-3  Provider/Team: Maciej HARMON  Warfarin Prior to Admission: Yes  Warfarin PTA Regimen: Dose increased in past week to 12.5mg Monday, Friday; 15mg rest of week  Significant drug interactions: Started on Levaquin this admission  Recent documented change in oral intake/nutrition: Unknown    INR   Date Value Ref Range Status   10/23/2019 1.65 (H) 0.86 - 1.14 Final   10/18/2019 1.14 0.86 - 1.14 Final       Patient states dose was taken 10/28.  No INR drawn in ER, will await INR result this AM and dose daily based on results.   Pharmacy will monitor Bia Bill daily and order warfarin doses to achieve specified goal.      Please contact pharmacy as soon as possible if the warfarin needs to be held for a procedure or if the warfarin goals change.      Tamela Singleton, PharmD

## 2019-10-29 NOTE — ED TRIAGE NOTES
"Reports feeling short of breath and \"sick in general\" for past 24 hours. Has been coughing and unable to sleep because of breathing issues. States the man that lives with her is a chain smoker and the house is often filled with smoke. Hx COPD  Janice Baez RN on 10/28/2019 at 8:46 PM    "

## 2019-10-29 NOTE — CONSULTS
Care Transitions:    Consult was placed for discharge planning.  Chart reviewed and plan of care discussed in Interdisciplinary Rounds.  Per the discussion in Interdisciplinary Rounds, no consult is needed.    Care Transitions will continue to monitor and through daily Interdisciplinary Rounds.      Rhiannon Landon RN, Care Coordinator [phone]

## 2019-10-29 NOTE — ED PROVIDER NOTES
HPI   The patient is a 53-year-old female presenting by EMS transport for shortness of breath.  Symptoms began yesterday evening.  She has had a cough with yellow production.  She has had wheezing.  She has had some nasal congestion and rhinorrhea.  She denies sweats or chills or objective fever.  She has central chest pressure and discomfort that is worsened with coughing.  She has taken her albuterol 3 times today.  She received Solu-Medrol, albuterol neb, and a DuoNeb in route.  She denies new leg pain or swelling.  She denies back, arm, neck, or jaw pain.  She denies lightheadedness or palpitations.  No fainting.  She feels constantly short of breath today.  Minimal activity worsens her breathing.        Allergies:  Allergies   Allergen Reactions     Darvocet [Propoxyphene N-Apap] Anaphylaxis     Darvocet,Percocet      Percocet [Oxycodone-Acetaminophen] Anaphylaxis, Hives and Swelling     Has tolerated hydromorphone in the past.      Asa [Aspirin] Hives     Aspirin causes seizures and hives, throat swelling.   Has tolerated ketorolac in the past.        Bee      Ibuprofen Swelling     Throat swelling per patient      Lyrica [Pregabalin] Other (See Comments) and Swelling     dizziness     Povidone Iodine Rash     Reaction to topical betadine     Tape [Adhesive Tape] Rash     Problem List:    Patient Active Problem List    Diagnosis Date Noted     Cellulitis 06/06/2019     Priority: Medium     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 10/03/2018     Priority: Medium     Conductive hearing loss of left ear with restricted hearing of right ear 04/17/2018     Priority: Medium     Sensorineural hearing loss (SNHL) of right ear with restricted hearing of left ear 04/17/2018     Priority: Medium     Morbid obesity with alveolar hypoventilation (H) 02/01/2017     Priority: Medium     Cyst of left ovary 01/11/2016     Priority: Medium     PVD (peripheral vascular disease) with  claudication (H) 10/30/2015     Priority: Medium     LEFT LE. STENT x 3 LEFT UPPER LEG       Morbid obesity (H) 06/17/2015     Priority: Medium     Vitamin D deficiency 11/05/2014     Priority: Medium     Problem list name updated by automated process. Provider to review       Sebaceous cyst of right axilla 10/14/2014     Priority: Medium     HTN, goal below 140/90 10/14/2014     Priority: Medium     Left leg pain 10/14/2014     Priority: Medium     Stroke (H) 07/10/2014     Priority: Medium     Somatization disorder 07/10/2014     Priority: Medium     Benign neoplasm of colon (POLYPOSIS) 06/04/2014     Priority: Medium     Specimen #: [id number]   Collected: 11/7/2017   Received: 11/7/2017   Reported: 11/9/2017 19:59   Ordering Phy(s): CARMEL GALLOWAY     For improved result formatting, select 'View Enhanced Report Format'   under Linked Documents section.     SPECIMEN(S):   Colon polyp, 30 cm     FINAL DIAGNOSIS:   Colon at 30 cm, mucosal biopsy:   - Hyperplastic polyp, with focal changes suggestive of sessile serrated   adenoma.     Electronically signed out by:     Eric Rivas M.D.  = per Surveillance Recommendations:  Repeat in 5 yrs.    Notes Recorded by Moris Thomas MD on 4/25/2014 at 1:36 PM  Adenomatous polyp colonoscopy 5 years         DDD (degenerative disc disease), cervical 12/17/2013     Priority: Medium     Health Care Home 09/03/2013     Priority: Medium     Status:  Accepted  Care Coordinator:  Alla Fernandez    See Letters for [facility]  Date:  October 20, 2014         Rosacea 08/26/2013     Priority: Medium     COPD (chronic obstructive pulmonary disease) (H) 08/23/2012     Priority: Medium     Pulmonary Function test by Sylvester Erickson MD on 9/13/2013 3:38 PM   IDENTIFICATION: Bia Bill is a 47-year-old female with obesity and a history of tobacco use.   RESULTS:   1. Spirometry: FEV1 moderately reduced. FVC moderately reduced. FEV1/FVC ratio normal.   2. Bronchodilator Response: No  significant change is seen with bronchodilators.   INTERPRETATION: Moderate reduction in FEV1 and FVC with well-preserved FEV1/FVC ratio. This can be seen in the setting of obstructive lung disease with air trapping or with restrictive lung disease. Clinical correlation is needed. Further testing with lung volumes and DLCO may be useful.   KACEY JHA MD          Moderate mixed bipolar I disorder (H) 10/13/2011     Priority: Medium     Problem list name updated by automated process. Provider to review       Personality disorder, depressive 10/13/2011     Priority: Medium     Erythrocytosis      Priority: Medium     Smoker 04/01/2011     Priority: Medium     3/2011  Not interested in quitting at this time.   Will consider and let us know how we can be of assistance.   Understands long term risks associated with same and increased risk of blood clot formation.        GILES (Obstructive Sleep Apnea)-Moderate (AHI 16) 03/01/2010     Priority: Medium     RLS (restless legs syndrome) 03/01/2010     Priority: Medium     Ferritin 11.       Fatty liver 08/28/2009     Priority: Medium     US on 8/26/2009        Severe episode of recurrent major depressive disorder (H) 11/03/2008     Priority: Medium     Hypothyroidism 05/13/2008     Priority: Medium     was hyperthyroid - Hashimoto's and graves and had iodine treatment done at [place] in early 2008.    Now hypothyroid - is following at [facility], on 125mcg levothyroxine - see scanned documents       Esophageal reflux 04/29/2008     Priority: Medium     Developmental reading disorder 09/12/2007     Priority: Medium     Problem list name updated by automated process. Provider to review       Chondromalacia of patella 02/27/2007     Priority: Medium     SECONDARY POLYCYTHEMIA      Priority: Medium     Betheda HGB, a high-oxygen affinity hemoglobin which results in a normal compensatory erythrocytosis.  There is no specific therapy needed.  I did order a hemoglobin  electrophoresis today to help confirm this in [place], as she tells me she has never had this test performed. Dr. Riley 1/17/07       Alcohol abuse, in remission 08/01/2006     Priority: Medium     Quit 89 [address] court ordered, lost her children,        Mild persistent asthma 07/11/2006     Priority: Medium     Polyneuropathy in other diseases classified elsewhere (H) 07/11/2006     Priority: Medium     Has had chronic pain in the left groin and upper leg secondary to a blood clot in the thigh, treated with neurontin without benefit, still has chronic pain since 2005, sharp shooting intermittantly.    MRI/MRA 12/07 - [facility] - see scanned documents   - had mild nonspecific white matter changes  Otherwise negative     MRI c- spine [facility] 12/07 - no cervical cord abn.  Small disc protrusion C6-C7, no impingement       DVT (deep venous thrombosis) (H) 07/11/2006     Priority: Medium     She had a DVT post pregnancy and delivery in 1992.   3/26/2004:Hospitalized at [facility] for extensive left lower leg DVT.  This occurred a month after pneumonia episode and decreased activity.  She had lytic therapy, tPA followed by Possis mechanical thrombectomy device and three stents in veins.  She did have Groshong catheter at the time.  There was a positive lupus anticoagulant, negative Factor V and cardiolipin clement.         Past Medical History:    Past Medical History:   Diagnosis Date     Acromioclavicular joint arthritis 1/25/2017     Acute bronchospasm 8/15/2016     Acute gouty arthritis 8/4/2014     Anxiety state, unspecified      Bipolar I disorder, most recent episode (or current) unspecified      Closed fracture of metatarsal bone 6/5/2007     Depressive disorder, not elsewhere classified      DVT, lower extremity (H)      Headache 10/17/2014     Impingement syndrome of shoulder region 1/25/2017     Polycythemia, secondary      Right shoulder pain 10/14/2014     Trochanteric bursitis of both hips 1/11/2016     Past Surgical  History:    Past Surgical History:   Procedure Laterality Date     BILIARY STENT SINGLE USE COV      3 stints in left leg     BONE MARROW BIOPSY, BONE SPECIMEN, NEEDLE/TROCAR N/A 11/17/2014    Procedure: BIOPSY BONE MARROW;  Surgeon: Hay Aaron MD;  Location: [facility]     D & C  10/26/09    with uterine ablation     ESOPHAGOSCOPY, GASTROSCOPY, DUODENOSCOPY (EGD), COMBINED  4/21/2014    Procedure: Gastroscopy;  Surgeon: Moris Thomas MD;  Location: [facility]     HYSTERECTOMY, PAP NO LONGER INDICATED  1-4-2010     LAPAROSCOPIC CHOLECYSTECTOMY N/A 1/4/2019    Procedure: Laparoscopic cholecystectomy;  Surgeon: Aaron Siegel MD;  Location: [facility]     LITHOTRIPSY  2004    Lithotrypsy     Family History:    Family History   Problem Relation Age of Onset     Hypertension Mother      Diabetes Mother      Blood Disease Mother      Heart Disease Mother         chf     Cerebrovascular Disease Mother      Hypertension Father      Cancer Father         lung cancer     Allergies Sister      Diabetes Sister      Depression Sister      Hypertension Sister      Social History:  Marital Status:   [4]  Social History     Tobacco Use     Smoking status: Current Every Day Smoker     Packs/day: 0.50     Years: 34.00     Pack years: 17.00     Types: Cigarettes     Smokeless tobacco: Never Used     Tobacco comment: started at age 11.  Quit during pregnancyX4.  Quit 3 months ago.    Substance Use Topics     Alcohol use: No     Alcohol/week: 0.0 standard drinks     Comment: quit 1995     Drug use: No     Comment: past hx 22 years ago/ uppers downers, canibus      Medications:    acetaminophen (TYLENOL) 500 MG tablet  albuterol (2.5 MG/3ML) 0.083% neb solution  albuterol (PROAIR HFA/PROVENTIL HFA/VENTOLIN HFA) 108 (90 BASE) MCG/ACT Inhaler  allopurinol (ZYLOPRIM) 300 MG tablet  ARIPiprazole (ABILIFY) 10 MG tablet  buPROPion (WELLBUTRIN XL) 150 MG 24 hr tablet  EPINEPHrine (EPIPEN/ADRENACLICK/OR ANY BX GENERIC EQUIV)  "0.3 MG/0.3ML injection 2-pack  furosemide (LASIX) 20 MG tablet  gabapentin (NEURONTIN) 300 MG capsule  gabapentin (NEURONTIN) 600 MG tablet  gabapentin (NEURONTIN) 600 MG tablet  HYDROmorphone (DILAUDID) 2 MG tablet  ipratropium - albuterol 0.5 mg/2.5 mg/3 mL (DUONEB) 0.5-2.5 (3) MG/3ML neb solution  lamoTRIgine (LAMICTAL) 100 MG tablet  levothyroxine (SYNTHROID/LEVOTHROID) 150 MCG tablet  mometasone-formoterol (DULERA) 200-5 MCG/ACT oral inhaler  omeprazole (PRILOSEC) 20 MG DR capsule  order for DME  order for DME  order for DME  order for DME  order for DME  order for DME  potassium chloride ER (K-DUR/KLOR-CON M) 10 MEQ CR tablet  pramipexole (MIRAPEX) 0.125 MG tablet  simvastatin (ZOCOR) 20 MG tablet  traZODone (DESYREL) 150 MG tablet  VITAMIN D3 1000 units tablet  warfarin (JANTOVEN) 5 MG tablet  warfarin ANTICOAGULANT (JANTOVEN ANTICOAGULANT) 5 MG tablet      Review of Systems   All other systems reviewed and are negative.      PE   BP: 132/56  Pulse: 93  Heart Rate: 93  Temp: 98.6  F (37  C)  Resp: 20  Height: 144.8 cm (4' 9\")  Weight: 104.3 kg (230 lb)  SpO2: 95 %  Physical Exam  Vitals signs reviewed.   Constitutional:       General: She is in acute distress.      Comments: MO.  Increased work of breathing.  Oxygen by nasal cannula.   HENT:      Head: Normocephalic and atraumatic.      Mouth/Throat:      Mouth: Mucous membranes are moist.   Eyes:      Extraocular Movements: Extraocular movements intact.      Conjunctiva/sclera: Conjunctivae normal.   Neck:      Musculoskeletal: Normal range of motion.   Cardiovascular:      Rate and Rhythm: Normal rate and regular rhythm.   Pulmonary:      Comments: Rhonchi and wheezing bilaterally.  Abdominal:      Palpations: Abdomen is soft.      Tenderness: There is no tenderness.   Musculoskeletal: Normal range of motion.   Skin:     General: Skin is warm and dry.   Neurological:      Mental Status: She is alert and oriented to person, place, and time.   Psychiatric:    "      Behavior: Behavior normal.         ED COURSE and MDM   2109.  The patient has respiratory distress and wheezing.  She has had a cough with production.  Chest pressure described.  EKG pending.  Lab values pending.  Chest x-ray ordered.  She continues to wheeze and so albuterol will be given.  She received Solu-Medrol.  She is requiring oxygen supplementation to keep her O2 sats above 90.  She does not have a known history of requiring oxygen supplementation at home.  Her baseline O2 saturation is not known but she does have asthma/COPD.  88% with O2 by nasal cannula, 2 L/min.    2228.  Chest x-ray shows possible vascular congestion.  BNP added.  VBG is ordered.    2258.  The patient wakes up while spoken to.  She is able to keep her eyes awake and talk to me appropriately but she does tell me that she is tired.  Her VBG is abnormal and shows acidosis with high CO2.  She does continue to have respiratory distress though she tells me she feels better.  BiPAP will be started.    2350.  The patient has FiO2 of 50% with an O2 saturation of 95%.  She continues to have increased respiratory rate.  She continues to have sedation.  She has been on BiPAP since about 1130, 20 minutes ago.  Repeat VBG at 12:30 AM.    0100.  The patient is unchanged clinically.  She arouses to voice and will answer questions appropriately.  She does seem especially tired though.  Her pH is improved.  Her CO2 is 68.  I spoke with the hospitalist who agrees to accept the patient to his service.  He agrees to continue BiPAP while in the ICU environment.    EKG  (2130)   Interpretation performed by me.  Rate: 48     Rhythm: sinus     Axis: nl  Intervals: CO (12-2) 134, QRS (<12) 93, QTc (>5) 389  P wave: nl     QRS complex: nl  ST segment / T-wave: T wave inversion in lead III and V1 only.  Conclusion: Sinus bradycardia.    LABS  Labs Ordered and Resulted from Time of ED Arrival Up to the Time of Departure from the ED   COMPREHENSIVE METABOLIC  PANEL - Abnormal; Notable for the following components:       Result Value    Potassium 3.2 (*)     Glucose 139 (*)     Calcium 8.1 (*)     Albumin 3.2 (*)     Protein Total 6.2 (*)     All other components within normal limits   CBC WITH PLATELETS DIFFERENTIAL - Abnormal; Notable for the following components:    MCHC 29.0 (*)     RDW 18.9 (*)     Platelet Count 128 (*)     All other components within normal limits   BLOOD GAS VENOUS - Abnormal; Notable for the following components:    Ph Venous 7.26 (*)     PCO2 Venous 60 (*)     All other components within normal limits   BLOOD GAS VENOUS - Abnormal; Notable for the following components:    Ph Venous 7.30 (*)     PCO2 Venous 68 (*)     Bicarbonate Venous 33 (*)     All other components within normal limits   LACTIC ACID WHOLE BLOOD   NT PROBNP INPATIENT   TROPONIN I       IMAGING  Images reviewed by me.  Radiology report also reviewed.  XR Chest 2 Views   Final Result   IMPRESSION: Stable cardiomegaly. Diffuse bilateral vascular congestion   appears increased. This may represent edema and CHF. No effusions.       GIGI GARCIA MD          Procedures    Medications   albuterol (PROVENTIL) neb solution 2.5 mg (2.5 mg Nebulization Given 10/28/19 2116)   0.9% sodium chloride BOLUS (500 mLs Intravenous New Bag 10/28/19 2133)         IMPRESSION       ICD-10-CM    1. COPD exacerbation (H) J44.1    2. Acute respiratory failure with hypoxia (H) J96.01             Medication List      There are no discharge medications for this visit.                       Baldo Ellis MD  10/29/19 [id number]

## 2019-10-29 NOTE — PLAN OF CARE
Remained on bipap all night. Although lethargic, patient is oriented to person, place, time and situation. Tolerates bipap well but desats quickly when taken off. Denies pain.   Janice Baez RN on 10/29/2019 at 5:29 AM

## 2019-10-30 LAB
ALBUMIN SERPL-MCNC: 3.3 G/DL (ref 3.4–5)
ALP SERPL-CCNC: 115 U/L (ref 40–150)
ALT SERPL W P-5'-P-CCNC: 50 U/L (ref 0–50)
ANION GAP SERPL CALCULATED.3IONS-SCNC: 4 MMOL/L (ref 3–14)
AST SERPL W P-5'-P-CCNC: 22 U/L (ref 0–45)
BASE EXCESS BLDV CALC-SCNC: 8.3 MMOL/L
BILIRUB SERPL-MCNC: 0.4 MG/DL (ref 0.2–1.3)
BUN SERPL-MCNC: 25 MG/DL (ref 7–30)
CALCIUM SERPL-MCNC: 8.9 MG/DL (ref 8.5–10.1)
CHLORIDE SERPL-SCNC: 100 MMOL/L (ref 94–109)
CO2 SERPL-SCNC: 36 MMOL/L (ref 20–32)
CREAT SERPL-MCNC: 0.78 MG/DL (ref 0.52–1.04)
ERYTHROCYTE [DISTWIDTH] IN BLOOD BY AUTOMATED COUNT: 19 % (ref 10–15)
FLUAV H1 2009 PAND RNA SPEC QL NAA+PROBE: NEGATIVE
FLUAV H1 RNA SPEC QL NAA+PROBE: NEGATIVE
FLUAV H3 RNA SPEC QL NAA+PROBE: NEGATIVE
FLUAV RNA SPEC QL NAA+PROBE: NEGATIVE
FLUAV+FLUBV RNA SPEC QL NAA+PROBE: NEGATIVE
FLUAV+FLUBV RNA SPEC QL NAA+PROBE: NEGATIVE
FLUBV RNA SPEC QL NAA+PROBE: NEGATIVE
GFR SERPL CREATININE-BSD FRML MDRD: 88 ML/MIN/{1.73_M2}
GLUCOSE BLDC GLUCOMTR-MCNC: 115 MG/DL (ref 70–99)
GLUCOSE BLDC GLUCOMTR-MCNC: 149 MG/DL (ref 70–99)
GLUCOSE BLDC GLUCOMTR-MCNC: 200 MG/DL (ref 70–99)
GLUCOSE BLDC GLUCOMTR-MCNC: 317 MG/DL (ref 70–99)
GLUCOSE SERPL-MCNC: 127 MG/DL (ref 70–99)
HADV DNA SPEC QL NAA+PROBE: NEGATIVE
HADV DNA SPEC QL NAA+PROBE: NEGATIVE
HCO3 BLDV-SCNC: 37 MMOL/L (ref 21–28)
HCT VFR BLD AUTO: 48 % (ref 35–47)
HGB BLD-MCNC: 13.5 G/DL (ref 11.7–15.7)
HMPV RNA SPEC QL NAA+PROBE: NEGATIVE
HPIV1 RNA SPEC QL NAA+PROBE: NEGATIVE
HPIV2 RNA SPEC QL NAA+PROBE: NEGATIVE
HPIV3 RNA SPEC QL NAA+PROBE: NEGATIVE
INR PPP: 2.88 (ref 0.86–1.14)
MCH RBC QN AUTO: 26.1 PG (ref 26.5–33)
MCHC RBC AUTO-ENTMCNC: 28.1 G/DL (ref 31.5–36.5)
MCV RBC AUTO: 93 FL (ref 78–100)
MICROBIOLOGIST REVIEW: ABNORMAL
O2/TOTAL GAS SETTING VFR VENT: 28 %
PCO2 BLDV: 73 MM HG (ref 40–50)
PH BLDV: 7.32 PH (ref 7.32–7.43)
PLATELET # BLD AUTO: 108 10E9/L (ref 150–450)
PO2 BLDV: 37 MM HG (ref 25–47)
POTASSIUM SERPL-SCNC: 3.7 MMOL/L (ref 3.4–5.3)
PROT SERPL-MCNC: 6.4 G/DL (ref 6.8–8.8)
RBC # BLD AUTO: 5.17 10E12/L (ref 3.8–5.2)
RHINOVIRUS RNA SPEC QL NAA+PROBE: POSITIVE
RSV RNA SPEC NAA+PROBE: NEGATIVE
RSV RNA SPEC QL NAA+PROBE: NEGATIVE
RSV RNA SPEC QL NAA+PROBE: NEGATIVE
SODIUM SERPL-SCNC: 140 MMOL/L (ref 133–144)
SPECIMEN SOURCE: ABNORMAL
SPECIMEN SOURCE: NORMAL
WBC # BLD AUTO: 6.6 10E9/L (ref 4–11)

## 2019-10-30 PROCEDURE — 99232 SBSQ HOSP IP/OBS MODERATE 35: CPT | Performed by: FAMILY MEDICINE

## 2019-10-30 PROCEDURE — 94640 AIRWAY INHALATION TREATMENT: CPT | Mod: 76

## 2019-10-30 PROCEDURE — 25000132 ZZH RX MED GY IP 250 OP 250 PS 637: Performed by: FAMILY MEDICINE

## 2019-10-30 PROCEDURE — 00000146 ZZHCL STATISTIC GLUCOSE BY METER IP

## 2019-10-30 PROCEDURE — 25000128 H RX IP 250 OP 636: Performed by: PHYSICIAN ASSISTANT

## 2019-10-30 PROCEDURE — 25000132 ZZH RX MED GY IP 250 OP 250 PS 637: Performed by: PHYSICIAN ASSISTANT

## 2019-10-30 PROCEDURE — 25000125 ZZHC RX 250: Performed by: PHYSICIAN ASSISTANT

## 2019-10-30 PROCEDURE — 25000131 ZZH RX MED GY IP 250 OP 636 PS 637: Performed by: PHYSICIAN ASSISTANT

## 2019-10-30 PROCEDURE — 40000275 ZZH STATISTIC RCP TIME EA 10 MIN

## 2019-10-30 PROCEDURE — 40000270 ZZH STATISTIC OXYGEN  O2DAILY TECH TIME

## 2019-10-30 PROCEDURE — 36415 COLL VENOUS BLD VENIPUNCTURE: CPT | Performed by: PHYSICIAN ASSISTANT

## 2019-10-30 PROCEDURE — 85610 PROTHROMBIN TIME: CPT | Performed by: PHYSICIAN ASSISTANT

## 2019-10-30 PROCEDURE — 94640 AIRWAY INHALATION TREATMENT: CPT

## 2019-10-30 PROCEDURE — 12000011 ZZH R&B MS OVERFLOW

## 2019-10-30 PROCEDURE — 85027 COMPLETE CBC AUTOMATED: CPT | Performed by: PHYSICIAN ASSISTANT

## 2019-10-30 PROCEDURE — 25000125 ZZHC RX 250: Performed by: FAMILY MEDICINE

## 2019-10-30 PROCEDURE — 82803 BLOOD GASES ANY COMBINATION: CPT | Performed by: FAMILY MEDICINE

## 2019-10-30 PROCEDURE — 94660 CPAP INITIATION&MGMT: CPT

## 2019-10-30 PROCEDURE — 80053 COMPREHEN METABOLIC PANEL: CPT | Performed by: PHYSICIAN ASSISTANT

## 2019-10-30 PROCEDURE — 25000128 H RX IP 250 OP 636: Performed by: FAMILY MEDICINE

## 2019-10-30 PROCEDURE — 25800030 ZZH RX IP 258 OP 636: Performed by: PHYSICIAN ASSISTANT

## 2019-10-30 RX ADMIN — ALBUTEROL SULFATE 2.5 MG: 2.5 SOLUTION RESPIRATORY (INHALATION) at 23:40

## 2019-10-30 RX ADMIN — LAMOTRIGINE 100 MG: 100 TABLET ORAL at 08:18

## 2019-10-30 RX ADMIN — PREDNISONE 60 MG: 50 TABLET ORAL at 08:18

## 2019-10-30 RX ADMIN — IPRATROPIUM BROMIDE AND ALBUTEROL SULFATE 3 ML: .5; 3 SOLUTION RESPIRATORY (INHALATION) at 15:39

## 2019-10-30 RX ADMIN — GABAPENTIN 300 MG: 300 CAPSULE ORAL at 22:13

## 2019-10-30 RX ADMIN — ACETAMINOPHEN 1000 MG: 500 TABLET, FILM COATED ORAL at 20:24

## 2019-10-30 RX ADMIN — BUPROPION HYDROCHLORIDE 150 MG: 150 TABLET, FILM COATED, EXTENDED RELEASE ORAL at 17:26

## 2019-10-30 RX ADMIN — INSULIN ASPART 4 UNITS: 100 INJECTION, SOLUTION INTRAVENOUS; SUBCUTANEOUS at 12:13

## 2019-10-30 RX ADMIN — Medication 1 MG: at 05:29

## 2019-10-30 RX ADMIN — FLUTICASONE FUROATE AND VILANTEROL TRIFENATATE 1 PUFF: 200; 25 POWDER RESPIRATORY (INHALATION) at 08:20

## 2019-10-30 RX ADMIN — FUROSEMIDE 40 MG: 10 INJECTION, SOLUTION INTRAMUSCULAR; INTRAVENOUS at 08:17

## 2019-10-30 RX ADMIN — TRAZODONE HYDROCHLORIDE 150 MG: 50 TABLET ORAL at 22:13

## 2019-10-30 RX ADMIN — PRAMIPEXOLE DIHYDROCHLORIDE 0.12 MG: 0.12 TABLET ORAL at 22:14

## 2019-10-30 RX ADMIN — POTASSIUM CHLORIDE 10 MEQ: 750 TABLET, FILM COATED, EXTENDED RELEASE ORAL at 08:18

## 2019-10-30 RX ADMIN — GABAPENTIN 600 MG: 600 TABLET, FILM COATED ORAL at 08:18

## 2019-10-30 RX ADMIN — ALLOPURINOL 300 MG: 300 TABLET ORAL at 08:18

## 2019-10-30 RX ADMIN — SIMVASTATIN 20 MG: 20 TABLET, FILM COATED ORAL at 22:14

## 2019-10-30 RX ADMIN — INSULIN ASPART 2 UNITS: 100 INJECTION, SOLUTION INTRAVENOUS; SUBCUTANEOUS at 17:27

## 2019-10-30 RX ADMIN — AZITHROMYCIN MONOHYDRATE 500 MG: 500 INJECTION, POWDER, LYOPHILIZED, FOR SOLUTION INTRAVENOUS at 10:32

## 2019-10-30 RX ADMIN — IPRATROPIUM BROMIDE AND ALBUTEROL SULFATE 3 ML: .5; 3 SOLUTION RESPIRATORY (INHALATION) at 12:59

## 2019-10-30 RX ADMIN — LEVOTHYROXINE SODIUM 150 MCG: 75 TABLET ORAL at 06:32

## 2019-10-30 RX ADMIN — WARFARIN SODIUM 12.5 MG: 7.5 TABLET ORAL at 17:27

## 2019-10-30 RX ADMIN — OMEPRAZOLE 20 MG: 20 CAPSULE, DELAYED RELEASE ORAL at 06:32

## 2019-10-30 RX ADMIN — ARIPIPRAZOLE 10 MG: 10 TABLET ORAL at 17:26

## 2019-10-30 RX ADMIN — IPRATROPIUM BROMIDE AND ALBUTEROL SULFATE 3 ML: .5; 3 SOLUTION RESPIRATORY (INHALATION) at 19:31

## 2019-10-30 RX ADMIN — GABAPENTIN 600 MG: 600 TABLET, FILM COATED ORAL at 14:12

## 2019-10-30 RX ADMIN — IPRATROPIUM BROMIDE AND ALBUTEROL SULFATE 3 ML: .5; 3 SOLUTION RESPIRATORY (INHALATION) at 05:42

## 2019-10-30 RX ADMIN — FUROSEMIDE 40 MG: 10 INJECTION, SOLUTION INTRAMUSCULAR; INTRAVENOUS at 17:26

## 2019-10-30 RX ADMIN — GABAPENTIN 600 MG: 600 TABLET, FILM COATED ORAL at 22:13

## 2019-10-30 ASSESSMENT — ACTIVITIES OF DAILY LIVING (ADL)
ADLS_ACUITY_SCORE: 18
ADLS_ACUITY_SCORE: 20
ADLS_ACUITY_SCORE: 18
ADLS_ACUITY_SCORE: 17

## 2019-10-30 ASSESSMENT — PAIN DESCRIPTION - DESCRIPTORS: DESCRIPTORS: SORE

## 2019-10-30 ASSESSMENT — MIFFLIN-ST. JEOR: SCORE: 1591.88

## 2019-10-30 NOTE — PROGRESS NOTES
[facility]           Assessment & Plan        Bia Bill is a 53 year old female admitted on 10/28/2019. She is being admitted for acute respiratory failure secondary to COPD exacerbation.     Acute respiratory failure with hypoxia and hypercapnia  10/29/19 -- O2 at 88% on 2L initially in the emergency department. VBG showing respiratory acidosis (pH 7.26 / pCO2 60 / bicarb 27). Tachypneic, afebrile, no leukocytosis. Chest x-ray with bilateral vascular congestion suggestive of CHF. However, clinically has an obvious COPD exacerbation as well. Patient was placed on BiPAP in the emergency department.  - Admit to ICU on BiPAP  - Address COPD and CHF as below  - Repeat morning VBG showing resolution / compensation of acidosis, but hypercapnia persisting - repeat VBG at noon  10/30/2019 -- improving, weaning off oxygen, will try home CPAP tonight - RT to assess home machine as patient had a question if it was working.        COPD exacerbation  Mild persistent asthma  10/29/19 -- COPD and asthma at baseline (PFT's 2013 showing obstructive lung disease with air trapping vs restrictive lung disease). Managed prior to admission with Dulera 200/5 bid and prn albuterol. Significant rhonchi and expiratory wheezes on exam. Afebrile, no leukocytosis. Was given Solumedrol and DuoNebs in the emergency department, started on Levaquin. Suspect she has a viral illness that precipitated the COPD exacerbation.  - Prednisone 60 mg daily  - Scheduled DuoNebs  - Prn albuterol nebs  - Start azithromycin 500 mg IV daily, stop levaquin  - Prior to admission Dulera continued (changed to Breo due to formulary)  - Smoking cessation  10/30/2019 -- improving - flu negative,  Respiratory virus panel pending.        Lymphedema, bilateral  Doubt CHF exacerbation  10/29/19 -- Patient has diagnosis of lymphedema, but no formal diagnosis for heart failure. Echo March 2018 with mild LV hypertrophy, EF 60-65%.  Lymphedema managed prior to admission with lasix 40 mg bid. Edema is reportedly stable compared to baseline. Weight appears to be up about 7 pounds. Chest x-ray with evidence of vascular congestion.  - Daily weights  - Strict I&Os  - Change home lasix 40 oral twice daily to 40 mg IV bid  - No echo today, if not improving consider getting an echo  10/30/2019 -- edema baseline per patient, breathing much improved, weight stable.  Likely resume home lasix dosage tomorrow.       Hyperglycemia   10/29/19 -- Glucose elevated. No prior diabetes diagnosis, no recent A1c on record. Hyperglycemia possibly due to physiologic stress response and steroids, but possibly with undiagnosed diabetes mellitus as well - Medium sliding scale insulin  10/30/2019 -- A1c 6.1, suspect acutely up due to steroids, improving, continue sliding scale insulin prn       Personal history of DVT (deep vein thrombosis)  Long term use of anticoagulant therapy  10/29/19 -- DVT postpartum 1992 and again in 2004 due to decreased mobility. Managed prior to admission with coumadin, INR goal 2-3. Admit INR pending. Low concern for DVT/PE by exam/history.  - Pharmacy to manage coumadin dosing  10/30/2019 -- INR therapeutic, pharmacy dosing.       Erythrocytosis  Secondary polycythemia  10/29/19 -- Follows with Dr. Watts, last visit 9/18/19. Is following hematocrit / CBC monthly, phlebotomy as needed. Admit hematocrit normal (46.5).  - Monitor, CBC daily during hospitalization  10/30/2019 -- stable      HTN, goal below 140/90  10/29/19 -- Pressures reviewed, stable. Managed prior to admission with lasix 40 mg bid.  - Lasix as above  10/30/2019 -- blood pressure stable      Hypothyroidism  Managed prior to admission with levothyroxine 150 mcg daily, continue.     PVD (peripheral vascular disease) with claudication  History of left lower extremity stents x 3. Is on Zocor 20 mg daily - continue.     Esophageal reflux  Managed prior to admission with omeprazole  20 mg bid, continue.     Polyneuropathy in other diseases classified elsewhere  RLS (restless legs syndrome)  10/29/19 -- Stable. Managed prior to admission with gabapentin (600 mg tid, 900 mg q hs), Mirapex 0.125 mg q hs, and oral dilaudid 1-2 mg q 3 hours prn.  - Continue prior to admission Mirapex, gabapentin, and prn dilaudid  10/30/2019 -- no change      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  No evidence of gout on exam. Managed prior to admission with allopurinol 300 mg daily, continue.     Moderate mixed bipolar I disorder  Personality disorder, depressive  10/29/19 -- Stable mood on admission. Managed prior to admission with Abilify 10 mg daily, Wellbutrin  mg daily, trazodone 150 mg q hs, and Lamictal 100 mg daily.  - Continue prior to admission Abilify, Wellbutrin, trazodone, and Lamictal  10/30/2019 -- no change.      Smoker  Encourage cessation. Nicotine patch available.     GILES (Obstructive Sleep Apnea)-Moderate (AHI 16)  Uses CPAP at home.   10/30/2019 --  Transition to CPAP tonight if able.             Diet: Advance Diet as Tolerated: Regular Diet Adult      DVT Prophylaxis: Warfarin and Pneumatic Compression Devices    Goldsmith Catheter: not present    Code Status: DNR, may intubate              Disposition  Improving - transfer to med-surg, hope for discharge home if does ok with home CPAP overnight and activity tolerance improving.              Interval History:   Improving,  Doing well on room air at rest this afternoon, but still very winded with activity and still anxious about how she did overnight last night needing the BIPAP briefly without her home CPAP.  No new concerns or worsening symptoms, overall feels like she's improved, but doesn't yet feel like she'd be safe at home.  No pain             Review of Systems:    ROS: 10 point ROS neg other than the symptoms noted above in the HPI.             Medications:   Current active medications and  PTA medications reviewed, see medication list for details.            Physical Exam:   Vitals were reviewed  Patient Vitals for the past 24 hrs:   BP Temp Temp src Pulse Heart Rate Resp SpO2 Weight   10/30/19 1000 132/64 98.1  F (36.7  C) Oral 74 75 26 90 % --   10/30/19 0900 -- -- -- -- 75 25 91 % --   10/30/19 0800 130/65 98.8  F (37.1  C) -- 68 67 21 97 % --   10/30/19 0700 -- -- -- -- 69 (!) 35 96 % --   10/30/19 0614 -- -- -- -- -- 19 -- --   10/30/19 0612 -- -- -- -- -- -- -- 110.8 kg (244 lb 4.3 oz)   10/30/19 0600 134/66 -- -- 70 70 21 96 % --   10/30/19 0400 (!) 144/83 -- -- 75 78 21 95 % --   10/30/19 0330 -- -- -- -- -- -- 94 % --   10/30/19 0200 (!) 151/79 -- -- 80 82 28 96 % --   10/30/19 0000 (!) 148/67 -- -- 75 73 26 96 % --   10/29/19 2320 -- -- -- -- 74 23 97 % --   10/29/19 2200 118/69 -- -- 74 78 -- 96 % --   10/29/19 2154 -- -- -- -- -- 18 96 % --   10/29/19 2000 131/68 98  F (36.7  C) Oral 81 84 -- 97 % --   10/29/19 1945 -- -- -- -- 85 28 97 % --   10/29/19 1932 -- -- -- -- -- -- 97 % --   10/29/19 1900 (!) 141/81 -- -- 83 84 22 98 % --   10/29/19 1800 137/68 98.5  F (36.9  C) Oral 78 81 20 97 % --   10/29/19 1700 138/64 -- -- 85 84 25 94 % --   10/29/19 1631 -- -- -- -- -- -- 95 % --   10/29/19 1600 131/64 -- -- 83 82 29 94 % --   10/29/19 1513 -- 97.8  F (36.6  C) Oral -- -- -- -- --   10/29/19 1500 123/59 -- -- 84 85 25 94 % --   10/29/19 1400 126/60 -- -- 83 84 26 97 % --       Temperatures:  Current - Temp: 98.1  F (36.7  C); Max - Temp  Av.2  F (36.8  C)  Min: 97.8  F (36.6  C)  Max: 98.8  F (37.1  C)  Respiration range: Resp  Av.3  Min: 18  Max: 35  Pulse range: Pulse  Av.1  Min: 68  Max: 85  Blood pressure range: Systolic (24hrs), Av , Min:118 , Max:151   ; Diastolic (24hrs), Av, Min:59, Max:83    Pulse oximetry range: SpO2  Av.5 %  Min: 90 %  Max: 98 %  I/O last 3 completed shifts:  In: 1360 [P.O.:1360]  Out: -     Intake/Output Summary (Last 24 hours) at  10/30/2019 1318  Last data filed at 10/30/2019 0900  Gross per 24 hour   Intake 960 ml   Output --   Net 960 ml     EXAM:  General: awake and alert, NAD, oriented x 3  Head: normocephalic  Neck: unremarkable, no lymphadenopathy   HEENT: oropharynx pink and moist    Heart: Regular rate and rhythm, no murmurs, rubs, or gallops  Lungs: still somewhat decreased air movement with expiratory wheezing but improved.  No distress  Abdomen: soft, non-tender, no masses or organomegaly  Extremities: no edema in lower extremities   Skin unremarkable.               Data:     Results for orders placed or performed during the hospital encounter of 10/28/19 (from the past 24 hour(s))   Glucose by meter   Result Value Ref Range    Glucose 185 (H) 70 - 99 mg/dL   Glucose by meter   Result Value Ref Range    Glucose 180 (H) 70 - 99 mg/dL   Glucose by meter   Result Value Ref Range    Glucose 149 (H) 70 - 99 mg/dL   INR   Result Value Ref Range    INR 2.88 (H) 0.86 - 1.14   Comprehensive metabolic panel   Result Value Ref Range    Sodium 140 133 - 144 mmol/L    Potassium 3.7 3.4 - 5.3 mmol/L    Chloride 100 94 - 109 mmol/L    Carbon Dioxide 36 (H) 20 - 32 mmol/L    Anion Gap 4 3 - 14 mmol/L    Glucose 127 (H) 70 - 99 mg/dL    Urea Nitrogen 25 7 - 30 mg/dL    Creatinine 0.78 0.52 - 1.04 mg/dL    GFR Estimate 88 >60 mL/min/[1.73_m2]    GFR Estimate If Black >90 >60 mL/min/[1.73_m2]    Calcium 8.9 8.5 - 10.1 mg/dL    Bilirubin Total 0.4 0.2 - 1.3 mg/dL    Albumin 3.3 (L) 3.4 - 5.0 g/dL    Protein Total 6.4 (L) 6.8 - 8.8 g/dL    Alkaline Phosphatase 115 40 - 150 U/L    ALT 50 0 - 50 U/L    AST 22 0 - 45 U/L   CBC with platelets   Result Value Ref Range    WBC 6.6 4.0 - 11.0 10e9/L    RBC Count 5.17 3.8 - 5.2 10e12/L    Hemoglobin 13.5 11.7 - 15.7 g/dL    Hematocrit 48.0 (H) 35.0 - 47.0 %    MCV 93 78 - 100 fl    MCH 26.1 (L) 26.5 - 33.0 pg    MCHC 28.1 (L) 31.5 - 36.5 g/dL    RDW 19.0 (H) 10.0 - 15.0 %    Platelet Count 108 (L) 150 - 450  10e9/L   Blood gas venous   Result Value Ref Range    Ph Venous 7.32 7.32 - 7.43 pH    PCO2 Venous 73 (H) 40 - 50 mm Hg    PO2 Venous 37 25 - 47 mm Hg    Bicarbonate Venous 37 (H) 21 - 28 mmol/L    Base Excess Venous 8.3 mmol/L    FIO2 28    Glucose by meter   Result Value Ref Range    Glucose 317 (H) 70 - 99 mg/dL     *Note: Due to a large number of results and/or encounters for the requested time period, some results have not been displayed. A complete set of results can be found in Results Review.           Attestation:  I have reviewed today's vital signs, notes, medications, labs and imaging.  Amount of time spent in direct patient care: 25 minutes.     Dre Engle MD, MD

## 2019-10-30 NOTE — PROGRESS NOTES
Web paged Dr. Engle with critical lab results from respiratory virus panel positive Human Rhinovirus.

## 2019-10-30 NOTE — PLAN OF CARE
Ambulated in room with slight dyspnea, remained on 2L O2 all night. Woke up this morning with some shortness of breath but maintaining sats. Lungs sounds improving but still wheezy. Given neb this AM. 1mg Dilaudid given for abdominal pain.   Janice Baez RN on 10/30/2019 at 5:43 AM

## 2019-10-30 NOTE — PROGRESS NOTES
Pt Ox4. VSS. Now off of O2. Transferred to med/surg status. Continues to have cough and wheezing. CPAP brought in by family. RT to assess. Ambulating. Tolerating well. Eating majority of meals. Voiding. No c/o pain.

## 2019-10-30 NOTE — PROGRESS NOTES
RT present to do neb. Talked with patient and decided to go back on bipap for a little while due to blood gas results and work of breathing.   Janice Baez RN on 10/30/2019 at 5:56 AM

## 2019-10-31 ENCOUNTER — ANTICOAGULATION THERAPY VISIT (OUTPATIENT)
Dept: ANTICOAGULATION | Facility: CLINIC | Age: 54
End: 2019-10-31

## 2019-10-31 VITALS
SYSTOLIC BLOOD PRESSURE: 136 MMHG | HEART RATE: 75 BPM | RESPIRATION RATE: 16 BRPM | OXYGEN SATURATION: 94 % | HEIGHT: 57 IN | TEMPERATURE: 98.2 F | BODY MASS INDEX: 52.7 KG/M2 | DIASTOLIC BLOOD PRESSURE: 63 MMHG | WEIGHT: 244.27 LBS

## 2019-10-31 DIAGNOSIS — Z86.73 HISTORY OF STROKE: ICD-10-CM

## 2019-10-31 DIAGNOSIS — I73.9 PVD (PERIPHERAL VASCULAR DISEASE) WITH CLAUDICATION (H): ICD-10-CM

## 2019-10-31 DIAGNOSIS — I82.409 DVT (DEEP VENOUS THROMBOSIS) (H): ICD-10-CM

## 2019-10-31 LAB
ALBUMIN SERPL-MCNC: 3 G/DL (ref 3.4–5)
ALP SERPL-CCNC: 101 U/L (ref 40–150)
ALT SERPL W P-5'-P-CCNC: 45 U/L (ref 0–50)
ANION GAP SERPL CALCULATED.3IONS-SCNC: 7 MMOL/L (ref 3–14)
AST SERPL W P-5'-P-CCNC: 16 U/L (ref 0–45)
BILIRUB SERPL-MCNC: 0.5 MG/DL (ref 0.2–1.3)
BUN SERPL-MCNC: 26 MG/DL (ref 7–30)
CALCIUM SERPL-MCNC: 8.5 MG/DL (ref 8.5–10.1)
CHLORIDE SERPL-SCNC: 100 MMOL/L (ref 94–109)
CO2 SERPL-SCNC: 36 MMOL/L (ref 20–32)
CREAT SERPL-MCNC: 0.78 MG/DL (ref 0.52–1.04)
ERYTHROCYTE [DISTWIDTH] IN BLOOD BY AUTOMATED COUNT: 18.9 % (ref 10–15)
GFR SERPL CREATININE-BSD FRML MDRD: 86 ML/MIN/{1.73_M2}
GLUCOSE SERPL-MCNC: 158 MG/DL (ref 70–99)
HCT VFR BLD AUTO: 45.8 % (ref 35–47)
HGB BLD-MCNC: 13.1 G/DL (ref 11.7–15.7)
INR PPP: 1.93 (ref 0.86–1.14)
MCH RBC QN AUTO: 26 PG (ref 26.5–33)
MCHC RBC AUTO-ENTMCNC: 28.6 G/DL (ref 31.5–36.5)
MCV RBC AUTO: 91 FL (ref 78–100)
PLATELET # BLD AUTO: 128 10E9/L (ref 150–450)
POTASSIUM SERPL-SCNC: 3.1 MMOL/L (ref 3.4–5.3)
PROT SERPL-MCNC: 6 G/DL (ref 6.8–8.8)
RBC # BLD AUTO: 5.03 10E12/L (ref 3.8–5.2)
SODIUM SERPL-SCNC: 143 MMOL/L (ref 133–144)
WBC # BLD AUTO: 7.1 10E9/L (ref 4–11)

## 2019-10-31 PROCEDURE — 85610 PROTHROMBIN TIME: CPT | Performed by: FAMILY MEDICINE

## 2019-10-31 PROCEDURE — 80053 COMPREHEN METABOLIC PANEL: CPT | Performed by: FAMILY MEDICINE

## 2019-10-31 PROCEDURE — 25000132 ZZH RX MED GY IP 250 OP 250 PS 637: Performed by: FAMILY MEDICINE

## 2019-10-31 PROCEDURE — 94640 AIRWAY INHALATION TREATMENT: CPT

## 2019-10-31 PROCEDURE — 99239 HOSP IP/OBS DSCHRG MGMT >30: CPT | Performed by: FAMILY MEDICINE

## 2019-10-31 PROCEDURE — 85027 COMPLETE CBC AUTOMATED: CPT | Performed by: FAMILY MEDICINE

## 2019-10-31 PROCEDURE — 90682 RIV4 VACC RECOMBINANT DNA IM: CPT | Performed by: FAMILY MEDICINE

## 2019-10-31 PROCEDURE — 25000131 ZZH RX MED GY IP 250 OP 636 PS 637: Performed by: FAMILY MEDICINE

## 2019-10-31 PROCEDURE — 36415 COLL VENOUS BLD VENIPUNCTURE: CPT | Performed by: FAMILY MEDICINE

## 2019-10-31 PROCEDURE — 25000128 H RX IP 250 OP 636: Performed by: FAMILY MEDICINE

## 2019-10-31 PROCEDURE — 25000125 ZZHC RX 250: Performed by: FAMILY MEDICINE

## 2019-10-31 RX ORDER — PREDNISONE 10 MG/1
TABLET ORAL
Qty: 18 TABLET | Refills: 0 | Status: ON HOLD | OUTPATIENT
Start: 2019-10-31 | End: 2019-11-10

## 2019-10-31 RX ORDER — FUROSEMIDE 40 MG
40 TABLET ORAL
Status: DISCONTINUED | OUTPATIENT
Start: 2019-10-31 | End: 2019-10-31 | Stop reason: HOSPADM

## 2019-10-31 RX ADMIN — ALLOPURINOL 300 MG: 300 TABLET ORAL at 08:19

## 2019-10-31 RX ADMIN — POTASSIUM CHLORIDE 40 MEQ: 20 TABLET, EXTENDED RELEASE ORAL at 06:47

## 2019-10-31 RX ADMIN — POTASSIUM CHLORIDE 10 MEQ: 750 TABLET, FILM COATED, EXTENDED RELEASE ORAL at 08:24

## 2019-10-31 RX ADMIN — OMEPRAZOLE 20 MG: 20 CAPSULE, DELAYED RELEASE ORAL at 06:33

## 2019-10-31 RX ADMIN — FUROSEMIDE 40 MG: 40 TABLET ORAL at 08:19

## 2019-10-31 RX ADMIN — INSULIN ASPART 1 UNITS: 100 INJECTION, SOLUTION INTRAVENOUS; SUBCUTANEOUS at 08:18

## 2019-10-31 RX ADMIN — LAMOTRIGINE 100 MG: 100 TABLET ORAL at 08:19

## 2019-10-31 RX ADMIN — LEVOTHYROXINE SODIUM 150 MCG: 75 TABLET ORAL at 06:33

## 2019-10-31 RX ADMIN — INFLUENZA A VIRUS A/BRISBANE/02/2018 (H1N1) RECOMBINANT HEMAGGLUTININ ANTIGEN, INFLUENZA A VIRUS A/KANSAS/14/2017 (H3N2) RECOMBINANT HEMAGGLUTININ ANTIGEN, INFLUENZA B VIRUS B/PHUKET/3073/2013 RECOMBINANT HEMAGGLUTININ ANTIGEN, AND INFLUENZA B VIRUS B/MARYLAND/15/2016 RECOMBINANT HEMAGGLUTININ ANTIGEN 0.5 ML: 45; 45; 45; 45 INJECTION INTRAMUSCULAR at 10:37

## 2019-10-31 RX ADMIN — PREDNISONE 60 MG: 50 TABLET ORAL at 08:19

## 2019-10-31 RX ADMIN — POTASSIUM CHLORIDE 20 MEQ: 20 TABLET, EXTENDED RELEASE ORAL at 08:24

## 2019-10-31 RX ADMIN — FLUTICASONE FUROATE AND VILANTEROL TRIFENATATE 1 PUFF: 200; 25 POWDER RESPIRATORY (INHALATION) at 08:20

## 2019-10-31 RX ADMIN — GABAPENTIN 600 MG: 600 TABLET, FILM COATED ORAL at 08:19

## 2019-10-31 RX ADMIN — IPRATROPIUM BROMIDE AND ALBUTEROL SULFATE 3 ML: .5; 3 SOLUTION RESPIRATORY (INHALATION) at 09:23

## 2019-10-31 ASSESSMENT — ACTIVITIES OF DAILY LIVING (ADL)
ADLS_ACUITY_SCORE: 20

## 2019-10-31 NOTE — DISCHARGE SUMMARY
[facility] Discharge Summary    Bia Bill MRN# [id number]   Age: 53 year old YOB: 1965     Date of Admission:  10/28/2019  Date of Discharge::  10/31/2019  Admitting Physician:  Kd Valencia MD  Discharge Physician:  Dre Engle MD, MD             Admission Diagnoses:   COPD exacerbation (H) [J44.1]  Acute respiratory failure with hypoxia (H) [J96.01]          Principle Discharge Diagnosis:       Acute respiratory failure with hypoxia and hypercapnia    See hospital course for further active diagnoses addressed during this admission.            Procedures:   No procedures performed during this admission          Medications Prior to Admission:     Medications Prior to Admission   Medication Sig Dispense Refill Last Dose     acetaminophen (TYLENOL) 500 MG tablet Take 500-1,000 mg by mouth every 6 hours as needed for mild pain   10/25/2019     albuterol (PROAIR HFA/PROVENTIL HFA/VENTOLIN HFA) 108 (90 BASE) MCG/ACT Inhaler Inhale 2 puffs into the lungs every 6 hours as needed for shortness of breath / dyspnea or wheezing 3 Inhaler 1 10/28/2019 at 1500     allopurinol (ZYLOPRIM) 300 MG tablet Take 1 tablet (300 mg) by mouth daily 90 tablet 3 10/27/2019 at am     ARIPiprazole (ABILIFY) 10 MG tablet TAKE ONE TABLET BY MOUTH EVERY DAY (Patient taking differently: Take 10 mg by mouth every evening ) 90 tablet 1 10/27/2019 at 1500     buPROPion (WELLBUTRIN XL) 150 MG 24 hr tablet TAKE ONE TABLET BY MOUTH EVERY DAY IN THE EVENING (Patient taking differently: Take 150 mg by mouth every evening TAKE ONE TABLET BY MOUTH EVERY DAY IN THE EVENING) 150 tablet 0 10/27/2019 at 1500     furosemide (LASIX) 20 MG tablet TAKE TWO TABLETS BY MOUTH TWICE DAILY (Patient taking differently: Take 40 mg by mouth 2 times daily ) 360 tablet 3 10/27/2019 at 1500     gabapentin (NEURONTIN) 300 MG capsule TAKE ONE CAPSULE BY MOUTH ONCE DAILY AT BEDTIME WITH A 600 MG TABLET FOR A TOTAL DOSE  MG  (Patient taking differently: Take 300 mg by mouth At Bedtime TAKE ONE CAPSULE BY MOUTH ONCE DAILY AT BEDTIME WITH A 600 MG TABLET FOR A TOTAL DOSE  MG) 90 capsule 0 10/27/2019 at pm     gabapentin (NEURONTIN) 600 MG tablet TAKE ONE TABLET BY MOUTH THREE TIMES A DAY (Patient taking differently: Take 600 mg by mouth 3 times daily TAKE ONE TABLET BY MOUTH THREE TIMES A DAY) 90 tablet 3 10/28/2019 at 1500     HYDROmorphone (DILAUDID) 2 MG tablet Take 0.5-1 tablets (1-2 mg) by mouth every 3 hours as needed for moderate to severe pain 10 tablet 0 Past Month at none left     lamoTRIgine (LAMICTAL) 100 MG tablet Take 1 tablet (100 mg) by mouth daily 90 tablet 3 10/27/2019 at am     levothyroxine (SYNTHROID/LEVOTHROID) 150 MCG tablet Take 1 tablet (150 mcg) by mouth daily 90 tablet 2 10/28/2019 at am     mometasone-formoterol (DULERA) 200-5 MCG/ACT oral inhaler Inhale 2 puffs into the lungs 2 times daily 13 g 1 10/28/2019 at am     omeprazole (PRILOSEC) 20 MG DR capsule TAKE ONE CAPSULE BY MOUTH TWICE A DAY (Patient taking differently: Take 20 mg by mouth 2 times daily ) 180 capsule 1 10/28/2019 at am     potassium chloride ER (K-DUR/KLOR-CON M) 10 MEQ CR tablet TAKE ONE TABLET BY MOUTH ONCE DAILY (Patient taking differently: Take 10 mEq by mouth daily ) 90 tablet 0 10/28/2019 at am     pramipexole (MIRAPEX) 0.125 MG tablet TAKE 1-2 TABLETS BY MOUTH AT BEDTIME 60 tablet 10 10/27/2019 at pm     simvastatin (ZOCOR) 20 MG tablet TAKE ONE TABLET BY MOUTH EVERY NIGHT AT BEDTIME (Patient taking differently: Take 20 mg by mouth At Bedtime ) 90 tablet 3 10/27/2019 at pm     traZODone (DESYREL) 150 MG tablet Take 1 tablet (150 mg) by mouth At Bedtime 90 tablet 1 10/27/2019 at 2200     VITAMIN D3 1000 units tablet TAKE ONE TABLET BY MOUTH ONCE DAILY (Patient taking differently: Take 1,000 Units by mouth daily ) 100 tablet 2 10/28/2019 at pm     warfarin (JANTOVEN) 5 MG tablet 10 mg (5 mg x 2) every day or as directed by the  Anticoagulation Clinic 170 tablet 0 10/28/2019 at 1500     albuterol (2.5 MG/3ML) 0.083% neb solution Take 1 vial (2.5 mg) by nebulization every 6 hours as needed for shortness of breath / dyspnea or wheezing 1 Box 0 More than a month at machine broken     EPINEPHrine (EPIPEN/ADRENACLICK/OR ANY BX GENERIC EQUIV) 0.3 MG/0.3ML injection 2-pack Inject 0.3 mLs (0.3 mg) into the muscle once as needed for anaphylaxis (Patient not taking: Reported on 9/18/2019) 0.6 mL 0 0 this year     gabapentin (NEURONTIN) 600 MG tablet TAKE ONE TABLET BY MOUTH THREE TIMES A DAY (Patient taking differently: Take 600 mg by mouth 3 times daily ) 90 tablet 0 dc-old script duplicate     ipratropium - albuterol 0.5 mg/2.5 mg/3 mL (DUONEB) 0.5-2.5 (3) MG/3ML neb solution Take 1 vial (3 mLs) by nebulization every 6 hours as needed for shortness of breath / dyspnea or wheezing 30 vial 1 More than a month at machine broken     order for DME Equipment being ordered:x2 [employer] 30/40mmHg compression wraps with x2 extra prs of compression liners 1 each 0 Taking     order for DME Equipment being ordered: Nebulizer 1 Device 0 broken     order for DME Equipment being ordered: CPAP  AIRSENSE 10  5-18 CM H20  # [id number]   DN# [id number]   Taking     order for DME Equipment being ordered: DEPENDS SIZE LARGE 60 Month 5 Taking     order for DME Equipment being ordered: INCONTINENCE PADS   QID 1 Month 11 Taking     warfarin ANTICOAGULANT (JANTOVEN ANTICOAGULANT) 5 MG tablet 12.5 mg (5 mg x 2.5) every Mon, Fri; 15 mg (5 mg x 3) all other days OR AS DIRECTED BY  tablet 0              Discharge Medications:     Current Discharge Medication List      START taking these medications    Details   predniSONE (DELTASONE) 10 MG tablet Take  3 tabs daily x 3 days, then 2 tabs daily x 3 days then 1 tab daily x 3 days then stop.  Qty: 18 tablet, Refills: 0    Associated Diagnoses: COPD exacerbation (H)         CONTINUE these medications which have NOT CHANGED     Details   acetaminophen (TYLENOL) 500 MG tablet Take 500-1,000 mg by mouth every 6 hours as needed for mild pain      albuterol (PROAIR HFA/PROVENTIL HFA/VENTOLIN HFA) 108 (90 BASE) MCG/ACT Inhaler Inhale 2 puffs into the lungs every 6 hours as needed for shortness of breath / dyspnea or wheezing  Qty: 3 Inhaler, Refills: 1    Associated Diagnoses: Mild persistent asthma without complication      allopurinol (ZYLOPRIM) 300 MG tablet Take 1 tablet (300 mg) by mouth daily  Qty: 90 tablet, Refills: 3    Associated Diagnoses: Acute gouty arthritis      ARIPiprazole (ABILIFY) 10 MG tablet TAKE ONE TABLET BY MOUTH EVERY DAY  Qty: 90 tablet, Refills: 1    Associated Diagnoses: Major depressive disorder, recurrent episode, moderate (H)      buPROPion (WELLBUTRIN XL) 150 MG 24 hr tablet TAKE ONE TABLET BY MOUTH EVERY DAY IN THE EVENING  Qty: 150 tablet, Refills: 0    Associated Diagnoses: Moderate mixed bipolar I disorder (H)      furosemide (LASIX) 20 MG tablet TAKE TWO TABLETS BY MOUTH TWICE DAILY  Qty: 360 tablet, Refills: 3    Associated Diagnoses: Lymphedema of both lower extremities      gabapentin (NEURONTIN) 300 MG capsule TAKE ONE CAPSULE BY MOUTH ONCE DAILY AT BEDTIME WITH A 600 MG TABLET FOR A TOTAL DOSE  MG  Qty: 90 capsule, Refills: 0    Associated Diagnoses: Polyneuropathy in other diseases classified elsewhere (H)      !! gabapentin (NEURONTIN) 600 MG tablet TAKE ONE TABLET BY MOUTH THREE TIMES A DAY  Qty: 90 tablet, Refills: 3    Associated Diagnoses: Polyneuropathy in other diseases classified elsewhere (H)      HYDROmorphone (DILAUDID) 2 MG tablet Take 0.5-1 tablets (1-2 mg) by mouth every 3 hours as needed for moderate to severe pain  Qty: 10 tablet, Refills: 0    Associated Diagnoses: Cellulitis of right lower extremity      lamoTRIgine (LAMICTAL) 100 MG tablet Take 1 tablet (100 mg) by mouth daily  Qty: 90 tablet, Refills: 3    Associated Diagnoses: Major depressive disorder, recurrent episode,  moderate (H)      levothyroxine (SYNTHROID/LEVOTHROID) 150 MCG tablet Take 1 tablet (150 mcg) by mouth daily  Qty: 90 tablet, Refills: 2    Associated Diagnoses: Hypothyroidism, unspecified type      mometasone-formoterol (DULERA) 200-5 MCG/ACT oral inhaler Inhale 2 puffs into the lungs 2 times daily  Qty: 13 g, Refills: 1    Associated Diagnoses: COPD exacerbation (H); Chronic obstructive pulmonary disease, unspecified COPD type (H)      omeprazole (PRILOSEC) 20 MG DR capsule TAKE ONE CAPSULE BY MOUTH TWICE A DAY  Qty: 180 capsule, Refills: 1    Associated Diagnoses: Gastroesophageal reflux disease without esophagitis      potassium chloride ER (K-DUR/KLOR-CON M) 10 MEQ CR tablet TAKE ONE TABLET BY MOUTH ONCE DAILY  Qty: 90 tablet, Refills: 0    Associated Diagnoses: Lymphedema      pramipexole (MIRAPEX) 0.125 MG tablet TAKE 1-2 TABLETS BY MOUTH AT BEDTIME  Qty: 60 tablet, Refills: 10    Associated Diagnoses: Restless leg syndrome      simvastatin (ZOCOR) 20 MG tablet TAKE ONE TABLET BY MOUTH EVERY NIGHT AT BEDTIME  Qty: 90 tablet, Refills: 3    Associated Diagnoses: Hypercholesteremia      traZODone (DESYREL) 150 MG tablet Take 1 tablet (150 mg) by mouth At Bedtime  Qty: 90 tablet, Refills: 1    Associated Diagnoses: Major depressive disorder, recurrent episode, moderate (H)      VITAMIN D3 1000 units tablet TAKE ONE TABLET BY MOUTH ONCE DAILY  Qty: 100 tablet, Refills: 2    Associated Diagnoses: Lymphedema of both lower extremities      !! warfarin (JANTOVEN) 5 MG tablet 10 mg (5 mg x 2) every day or as directed by the Anticoagulation Clinic  Qty: 170 tablet, Refills: 0    Associated Diagnoses: DVT (deep venous thrombosis) (H)      albuterol (2.5 MG/3ML) 0.083% neb solution Take 1 vial (2.5 mg) by nebulization every 6 hours as needed for shortness of breath / dyspnea or wheezing  Qty: 1 Box, Refills: 0    Associated Diagnoses: Mild persistent asthma without complication      EPINEPHrine (EPIPEN/ADRENACLICK/OR ANY  BX GENERIC EQUIV) 0.3 MG/0.3ML injection 2-pack Inject 0.3 mLs (0.3 mg) into the muscle once as needed for anaphylaxis  Qty: 0.6 mL, Refills: 0    Associated Diagnoses: Anaphylactic reaction to bee sting, undetermined intent, subsequent encounter      !! gabapentin (NEURONTIN) 600 MG tablet TAKE ONE TABLET BY MOUTH THREE TIMES A DAY  Qty: 90 tablet, Refills: 0    Associated Diagnoses: Polyneuropathy in other diseases classified elsewhere (H)      ipratropium - albuterol 0.5 mg/2.5 mg/3 mL (DUONEB) 0.5-2.5 (3) MG/3ML neb solution Take 1 vial (3 mLs) by nebulization every 6 hours as needed for shortness of breath / dyspnea or wheezing  Qty: 30 vial, Refills: 1    Associated Diagnoses: Chronic obstructive pulmonary disease, unspecified COPD type (H)      !! order for DME Equipment being ordered:x2 [employer] 30/40mmHg compression wraps with x2 extra prs of compression liners  Qty: 1 each, Refills: 0    Associated Diagnoses: Secondary lymphedema      !! order for DME Equipment being ordered: Nebulizer  Qty: 1 Device, Refills: 0    Associated Diagnoses: COPD exacerbation (H); Chronic obstructive pulmonary disease, unspecified COPD type (H)      !! order for DME Equipment being ordered: CPAP  AIRSENSE 10  5-18 CM H20  SN# [id number]   DN# [id number]      !! order for DME Equipment being ordered: DEPENDS SIZE LARGE  Qty: 60 Month, Refills: 5    Associated Diagnoses: Mixed incontinence      !! order for DME Equipment being ordered: INCONTINENCE PADS   QID  Qty: 1 Month, Refills: 11    Associated Diagnoses: Other urinary incontinence      !! warfarin ANTICOAGULANT (JANTOVEN ANTICOAGULANT) 5 MG tablet 12.5 mg (5 mg x 2.5) every Mon, Fri; 15 mg (5 mg x 3) all other days OR AS DIRECTED BY [facility]  Qty: 180 tablet, Refills: 0    Associated Diagnoses: DVT (deep venous thrombosis) (H)       !! - Potential duplicate medications found. Please discuss with provider.                     Brief History of Illness:     From Admission H+P:   Bia  "RBITTNEY Bill is a 53 year old female who presented to the emergency department for evaluation of severe shortness of breath.     Patient had cold symptoms including nasal congestion and a rhinorrhea. Two evenings ago she developed a productive cough with yellow sputum. She has been coughing a lot, causing chest pain and upper abdominal pain when she coughs (pain is absent if not coughing). She reports subjective fevers at home but did not measure her temperature, denies chills.      Along with the cough, she has been short of breath and has increased wheezing. She tried her albuterol x 3 without improvement. She reports orthopnea and paroxysmal nocturnal dyspnea. She has chronic edema managed with lasix, but reports her edema is stable compared to baseline. She thinks she may have gained weight recently but is not sure.      She has bilateral leg cramps recently, but stable compared to baseline, feels different than her prior DVT calf pain. This morning she has a headache. She has been dizzy with her current symptoms but no syncope or falls. She some subjective leg numbness recently, but this is not demonstrated on exam. She feels she has been somewhat \"foggy\" mentally since onset of her symptoms, but no overt confusion or altered mental status.            TODAY:     Subjective:  Doing well. No pain.  Did not wear home CPAP overnight as she says the \"pressure isn't high enough so it doesn't feel like it does anything\", but did fine on room air overnight.  No fever or chills.  No new concerns.    No other pain.       ROS:   ROS: 10 point ROS neg other than the symptoms noted above in the HPI.     /63   Pulse 75   Temp 98.2  F (36.8  C) (Oral)   Resp 16   Ht 1.448 m (4' 9\")   Wt 110.8 kg (244 lb 4.3 oz)   LMP 09/27/2009   SpO2 94%   BMI 52.86 kg/m     EXAM:  General: awake and alert, NAD, oriented x 3  Head: normocephalic  Neck: unremarkable, no lymphadenopathy   HEENT: oropharynx pink and moist    Heart: " Regular rate and rhythm, no murmurs, rubs, or gallops  Lungs: now just mild wheezes bilaterally, but now pretty good air movement throughout  Abdomen: soft, non-tender, no masses or organomegaly  Extremities: no edema in lower extremities   Skin unremarkable.            Hospital Course:     Bia Bill is a 53 year old female admitted on 10/28/2019. She is being admitted for acute respiratory failure secondary to COPD exacerbation.     Acute respiratory failure with hypoxia and hypercapnia due to COPD exacerbation/rhinovirus as below  10/29/19 -- O2 at 88% on 2L initially in the emergency department. VBG showing respiratory acidosis (pH 7.26 / pCO2 60 / bicarb 27). Tachypneic, afebrile, no leukocytosis. Chest x-ray with bilateral vascular congestion suggestive of CHF. However, clinically has an obvious COPD exacerbation as well. Patient was placed on BiPAP in the emergency department.  - Admit to ICU on BiPAP  - Address COPD and CHF as below  - Repeat morning VBG showing resolution / compensation of acidosis, but hypercapnia persisting - repeat VBG at noon  10/30/2019 -- improving, weaning off oxygen, will try home CPAP tonight - RT to assess home machine as patient had a question if it was working.     10/31/2019 -- resolved, doing well on room air.       COPD exacerbation  Mild persistent asthma with exacerbation  Exacerbations due to acute rhinovirus infection   10/29/19 -- COPD and asthma at baseline (PFT's 2013 showing obstructive lung disease with air trapping vs restrictive lung disease). Managed prior to admission with Dulera 200/5 bid and prn albuterol. Significant rhonchi and expiratory wheezes on exam. Afebrile, no leukocytosis. Was given Solumedrol and DuoNebs in the emergency department, started on Levaquin. Suspect she has a viral illness that precipitated the COPD exacerbation.  - Prednisone 60 mg daily  - Scheduled DuoNebs  - Prn albuterol nebs  - Start azithromycin 500 mg IV daily, stop  levaquin  - Prior to admission Dulera continued (changed to Breo due to formulary)  - Smoking cessation  10/30/2019 -- improving - flu negative,   10/31/2019 -- doing well, viral panel positive for rhinovirus which is the likely underlying trigger here.  Ok to stop azithromycin.   Prednisone taper on discharge, continue prior to admission inhalers/nebs.         Lymphedema, bilateral without CHF exacerbation  10/29/19 -- Patient has diagnosis of lymphedema, but no formal diagnosis for heart failure. Echo March 2018 with mild LV hypertrophy, EF 60-65%. Lymphedema managed prior to admission with lasix 40 mg bid. Edema is reportedly stable compared to baseline. Weight appears to be up about 7 pounds. Chest x-ray with evidence of vascular congestion.  - Daily weights  - Strict I&Os  - Change home lasix 40 oral twice daily to 40 mg IV bid  - No echo today, if not improving consider getting an echo  10/30/2019 -- edema baseline per patient, breathing much improved, weight stable.    10/31/2019 -- resumed home lasix dosage prior to discharge.       Hyperglycemia - consistent with pre-diabetes  10/29/19 -- Glucose elevated. No prior diabetes diagnosis, no recent A1c on record. Hyperglycemia possibly due to physiologic stress response and steroids, but possibly with undiagnosed diabetes mellitus as well - Medium sliding scale insulin  10/30/2019 -- A1c 6.1, suspect acutely up due to steroids, improving, continue sliding scale insulin prn    10/31/2019 -- glucose improved, planning to wean down steroids on discharge so should improve with this, no medications on discharge - recommend follow-up with primary care provider for pre-diabetes.       Personal history of DVT (deep vein thrombosis)  Long term use of anticoagulant therapy  10/29/19 -- DVT postpartum 1992 and again in 2004 due to decreased mobility. Managed prior to admission with coumadin, INR goal 2-3. Admit INR pending. Low concern for DVT/PE by  exam/history.  10/31/2019 -- pharmacy dosing/coordinating anticoagulation clinic follow-up on discharge.       Erythrocytosis  Secondary polycythemia  10/29/19 -- Follows with Dr. Watts, last visit 9/18/19. Is following hematocrit / CBC monthly, phlebotomy as needed. Admit hematocrit normal (46.5).  - Monitor, CBC daily during hospitalization  10/31/2019 -- remained stable, continue routine outpatient follow-up.        HTN, goal below 140/90  10/29/19 -- Pressures reviewed, stable. Managed prior to admission with lasix 40 mg bid.  10/31/2019 -- blood pressure remained stable, no changes     Hypothyroidism  Managed prior to admission with levothyroxine 150 mcg daily, continued.     PVD (peripheral vascular disease) with claudication  History of left lower extremity stents x 3. Is on Zocor 20 mg daily - continued.     Esophageal reflux  Managed prior to admission with omeprazole 20 mg bid, continued.     Polyneuropathy in other diseases classified elsewhere  RLS (restless legs syndrome)  10/29/19 -- Stable. Managed prior to admission with gabapentin (600 mg tid, 900 mg q hs), Mirapex 0.125 mg q hs, and oral dilaudid 1-2 mg q 3 hours prn.  - Continue prior to admission Mirapex, gabapentin, and prn dilaudid  10/31/2019 -- no change      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  No evidence of gout on exam. Managed prior to admission with allopurinol 300 mg daily, continue.     Moderate mixed bipolar I disorder  Personality disorder, depressive  10/29/19 -- Stable mood on admission. Managed prior to admission with Abilify 10 mg daily, Wellbutrin  mg daily, trazodone 150 mg q hs, and Lamictal 100 mg daily.  - Continue prior to admission Abilify, Wellbutrin, trazodone, and Lamictal  10/31/2019 -- no change.       Smoker  Encourage cessation. Nicotine patch available.     GILES (Obstructive Sleep Apnea)-Moderate (AHI 16)  Uses CPAP at home, not using it much recently and  "refused to use it here due to saying the \"pressure isn't high enough\" so it doesn't feel like it's doing much, but did well on room air overnight prior to discharge - recommend outpatient follow-up with sleep clinic to reassess settings, but in the meantime recommended that she continue to use her machine even if she thinks the pressure needs to be higher.           Diet: Advance Diet as Tolerated: Regular Diet Adult       DVT Prophylaxis: Warfarin and Pneumatic Compression Devices     Goldsmith Catheter: not present     Code Status: DNR, may intubate                  Discharge Instructions and Follow-Up:     Discharge diet: Orders Placed This Encounter      Advance Diet as Tolerated: Regular Diet Adult       Discharge activity: Activity as tolerated   Discharge follow-up: Follow up with primary care provider in 7 days, follow-up on breathing, pre-diabetes and confirm that plan is in place to see sleep clinic for follow-up of her CPAP settings at that time.               Discharge Disposition:     Discharged to home      Attestation:  I have reviewed today's vital signs, notes, medications, labs and imaging.  Amount of time performed on this discharge summary: 45 minutes.    Dre Engle MD, MD      "

## 2019-10-31 NOTE — PROGRESS NOTES
Patient has been on room air all night and not wearing her home CPAP as she states it doesn't have enough pressure for her and that is why she hasn't been wearing it at home either. Suggested she wear it because some pressure would be better than nothing, but still did not wear it.  She reports she has rested but not slept overnight.  Patient otherwise is up indep. in the room and overall states she is feeling better.

## 2019-10-31 NOTE — PROGRESS NOTES
[facility] DISCHARGE NOTE    Patient discharged to home at 10:29 AM via wheel chair. Accompanied by significant other and staff. Discharge instructions reviewed with patient, opportunity offered to ask questions. Prescriptions sent to patients preferred pharmacy. All belongings sent with patient.    Favian Orellana RN

## 2019-10-31 NOTE — PROGRESS NOTES
Anticoag chart review after hospitalization or ED visit:    Visit date(s): 10/28/19 - 10/31/19    Reason for visit: COPD exacerbation, acute respiratory failure with hypoxia    New medications: prednisone taper        Next INR check date: 11/6/19   This recheck date is an appropriate timeframe given the changes noted during the ED/hospitalization.     Discussed plan with patient, she will continue on the new higher maintenance due to missed dose while inpatient. Patient still reports she is not feeling great.      Camilo TIWARI, RN, CACP

## 2019-11-01 ENCOUNTER — NURSE TRIAGE (OUTPATIENT)
Dept: NURSING | Facility: CLINIC | Age: 54
End: 2019-11-01

## 2019-11-01 ENCOUNTER — PATIENT OUTREACH (OUTPATIENT)
Dept: CARE COORDINATION | Facility: CLINIC | Age: 54
End: 2019-11-01
Payer: COMMERCIAL

## 2019-11-01 DIAGNOSIS — J44.9 CHRONIC OBSTRUCTIVE PULMONARY DISEASE, UNSPECIFIED COPD TYPE (H): Primary | Chronic | ICD-10-CM

## 2019-11-01 DIAGNOSIS — J45.30 MILD PERSISTENT ASTHMA WITHOUT COMPLICATION: ICD-10-CM

## 2019-11-01 RX ORDER — ALBUTEROL SULFATE 90 UG/1
2 AEROSOL, METERED RESPIRATORY (INHALATION) EVERY 6 HOURS PRN
Qty: 1 INHALER | Refills: 0 | Status: SHIPPED | OUTPATIENT
Start: 2019-11-01 | End: 2019-11-20

## 2019-11-01 RX ORDER — ALBUTEROL SULFATE 0.83 MG/ML
2.5 SOLUTION RESPIRATORY (INHALATION) EVERY 6 HOURS PRN
Qty: 1 BOX | Refills: 0 | Status: SHIPPED | OUTPATIENT
Start: 2019-11-01

## 2019-11-01 ASSESSMENT — ACTIVITIES OF DAILY LIVING (ADL): DEPENDENT_IADLS:: INDEPENDENT

## 2019-11-01 NOTE — PROGRESS NOTES
Clinic Care Coordination Contact    Clinic Care Coordination Contact  OUTREACH    Referral Information:  Referral Source: IP Report    Primary Diagnosis: COPD  Chief Complaint   Patient presents with     Clinic Care Coordination - [facility]     RN   Universal Utilization: Inpatient at [facility] from 10/28/19 to 10/31/19  Clinic Utilization  Difficulty keeping appointments:: No  Compliance Concerns: No  No-Show Concerns: No  No PCP office visit in Past Year: No  Utilization    Last refreshed: 11/1/2019  9:32 AM:  Hospital Admissions 2           Last refreshed: 11/1/2019  9:32 AM:  ED Visits 1           Last refreshed: 11/1/2019  9:32 AM:  No Show Count (past year) 2              Current as of: 11/1/2019  9:32 AM          Clinical Concerns:    Current Medical Concerns:  Patient returned clinic care coordinator call      Current Behavioral Concerns: Denies concerns     Education Provided to patient: Role of care coordination     Pain  Pain (GOAL):: No    Health Maintenance Reviewed: Due/Overdue   Health Maintenance Due   Topic Date Due     PREVENTIVE CARE VISIT  03/30/2012     OP ANNUAL INR REFERRAL  12/20/2012     ZOSTER IMMUNIZATION (1 of 2) 12/03/2016     MAMMO SCREENING  03/24/2019     ASTHMA CONTROL TEST  11/22/2019     Clinical Pathway: Clinic Care Coordination COPD Assessment    Discharge:    Day of hospital discharge: 10/31/19  What recommendations were made for follow up after your recent hospitalization? PCP follow up  Have the follow up appointments been scheduled? Yes  If not, can I help you set up these appointments? N/A  Transportation concerns (GOAL):: No  Is there a referral for Pulmonary Rehab? No    Symptom Review:    How have you been feeling now that you are home? Still short of breath. Coughing frequently. Chest feels tight when coughing   Are you having any increased shortness of breath? No  Symptoms  Anxiety: No  Chest pain: : No  Chills: No  Cough: Yes  Is your cough:: Productive, Worse when  you lie down  Fever: No  Fatigue: Yes  Increased sputum: Yes  Sputum Color: Yellow  Sputum Consistency: Thick  Night sweats: No  Weight change: : No  Wheezing: Yes  COPD symptoms limiting activities?: Yes  What COPD zone are you currently in?: Yellow  Taking COPD medications as prescribed: : Yes  Took steroids (by mouth) for COPD: Yes  Overall your COPD symptoms are (GOAL):: Stable    Do you have a COPD Action Plan? Yes **send pt a copy       Is the COPD Action Plan on refrigerator or available: Yes   reviewed  What number would you call if you were in the YELLOW zones:  [phone]    Medications:    Were you started on any new medications?  Yes, prednisone  Were any of your previous medications changed? No  Do you have all of your medications? No and needs refill of Duoneb for nebulizer  Have you had trouble filling your prescriptions? No  Are you medications effective in controlling your symptoms? Yes,   Are you currently on Prednisone (* does pt understand the tapering instructions)?  Yes, reviewed schedule with patient.  Medication reconciliation completed? Yes  Was MTM or Diabetic Education referral placed (*Make sure to put transitions as reason for referral)? no    Oxygen/DME:    Are you currently on oxygen?  No   Is this new for you? No   nebulizers and inhalers:     Activity:    How much activity can you do before you are SOB? Daily activities  Have you had to reduce your activities because of dyspnea or other symptoms? no     Diet:    Do you weigh yourself daily? No    Are there any current diet restrictions or changes per discharge instructions? No    Emotions/Lifestyle:    Do you smoke?  reports that she has been smoking cigarettes. She has a 17.00 pack-year smoking history. She has never used smokeless tobacco.  Would you like to try to quit smoking? Not ready yet    Medication Management:  Patient is independent in medication management    Functional Status:  Dependent ADLs:: Independent  Dependent  IADLs:: Independent  Bed or wheelchair confined:: No  Mobility Status: Independent  Fallen 2 or more times in the past year?: No  Any fall with injury in the past year?: No    Living Situation:  Current living arrangement:: I live in a private home with family  Type of residence:: Private home - no stairs    Diet/Exercise/Sleep:  Diet:: No added salt  Inadequate nutrition (GOAL):: No  Food Insecurity: No  Tube Feeding: No  Exercise:: Currently not exercising  Inadequate activity/exercise (GOAL):: No  Significant changes in sleep pattern (GOAL): No    Transportation:  Transportation concerns (GOAL):: No  Transportation means:: Accessible car     Psychosocial:  Gnosticism or spiritual beliefs that impact treatment:: No  Mental health DX:: No  Mental health management concern (GOAL):: No  Informal Support system:: Significant other, Children     Financial/Insurance:   Financial/Insurance concerns (GOAL):: No     Resources and Interventions:  Current Resources:  ;   Community Resources: None  Supplies used at home:: Incontinence Supplies, Nebulizer tubing, Compression Stockings  Equipment Currently Used at Home: none    Advance Care Plan/Directive  Advanced Care Plans/Directives on file:: No    Referrals Placed: None     Goals:   Goals        General    Medical (pt-stated)     Notes - Note created  11/1/2019 12:49 PM by Michela Whitaker RN    Goal Statement: I want my shortness of breath to improve    Date goal set: 11/1/19    Measure of Success: When I am not short of breath    Barriers: COPD    Strengths: Motivated    Date to Achieve By: February 2020    Patient expressed understanding of goal: Yes    Action steps to achieve this goal  1. I will get my nebulizer fixed  2. I will use nebulizer as instructed  3. I will take prednisone as prescribed        Patient/Caregiver understanding: Expresses understanding    Outreach Frequency: monthly  Future Appointments              In 5 days ArturoKd engel [facility]  Services [facility], [facility]    In 5 days Kd Leong MD [facility] [place], NICHELLE    In 2 weeks [facility], [facility]    In 1 month Patel Bazzi MD [facility],  Sleep CL    In 1 month [facility], [facility]    In 1 month Joshua Watts MD [facility], [facility]          Plan: Clinic care coordinator will send note to care team requesting refill of Duoneb solution for nebulizer.     Continue to follow and outreach in 2 weeks.    Michela Whitaker RN, [facility] - Primary Care Clinic RN Coordinator  [facility]   11/1/2019    1:01 PM  [phone]

## 2019-11-01 NOTE — PROGRESS NOTES
Clinic Care Coordination Contact  [facility]/Voicemail    Referral Source: IP Report    Clinical Data: Care Coordinator Outreach    Outreach attempted x 1.  Left message on patient's voicemail with call back information and requested return call.    Patient was inpatient at [facility] from 10/28/19 to 10/31/19 with COPD exacerbation.    Patient is scheduled to see PCP on 11/6/19     Plan:  Care Coordinator will try to reach patient again in 1-2 business days.    Michela Whitaker RN, [facility] - Primary Care Clinic RN Coordinator  [facility]   11/1/2019    9:21 AM  [phone]

## 2019-11-01 NOTE — TELEPHONE ENCOUNTER
Norah is requesting a refill for Albuterol.  NIRAV paged on call MD Doty to phone NIRAV back at 5:00 pm.  MD refilled medication albuterol inhaler.

## 2019-11-01 NOTE — LETTER
[facility]  Complex Care Plan  About Me:    Patient Name:  Bia Bill    YOB: 1965  Age:         53 year old   [facility] MRN:    [id number] Telephone Information:  Home Phone [phone]   Mobile [phone]       Address:  [address] [place] [id number] Email address:  No e-mail address on record      Emergency Contact(s)    Name Relationship Lgl Grd Work Phone Home Phone Mobile Phone   1. GREGORY CARLSON Significant ot*  none [phone]   2. MAE MUSTAFA Daughter  [phone]           Primary language:  English     needed? No   [facility] [employer]:  [phone] op. 1  Other communication barriers: Glasses  Preferred Method of Communication:  Mail  Current living arrangement: I live in a private home with family  Mobility Status/ Medical Equipment: Independent    Health Maintenance  Health Maintenance Reviewed: Due/Overdue   Health Maintenance Due   Topic Date Due     PREVENTIVE CARE VISIT  03/30/2012     OP ANNUAL INR REFERRAL  12/20/2012     ZOSTER IMMUNIZATION (1 of 2) 12/03/2016     MAMMO SCREENING  03/24/2019     ASTHMA CONTROL TEST  11/22/2019       My Access Plan  Medical Emergency 911   Primary Clinic Line [facility] [phone]   24 Hour Appointment Line [phone] or  [phone]) (toll-free)   24 Hour Nurse Line [phone] (toll-free)   Preferred Urgent Care [facility] [phone]   Preferred Hospital [place], [place]  [phone]   Preferred Pharmacy [place], [place] - [address] [facility]     Behavioral Health Crisis Line The National Suicide Prevention Lifeline at 1-705.697.6325 or 911             My Care Team Members  Patient Care Team       Relationship Specialty Notifications Start End    Kd Leong MD PCP - General Family Practice  5/26/16     Phone: [phone] Fax:  [phone]         [address] [facility] [address]    Karlkrystyna Coto    8/19/16     [facility]     Phone: [phone]         Kd Leong MD Assigned PCP   1/1/17     Phone: [phone] Fax: [phone]         [address] [facility] [address]    Raulito Marquez, [facility] Pharmacist Pharmacist Clinician- Clinical Pharmacy Specialist  6/17/19     Phone: [phone] Fax: [phone] [address] [place] [address]    Michela Whitaker, RN Lead Care Coordinator Primary Care - [facility] Admissions 11/1/19     Phone: [phone] Fax: [phone]                My Care Plans  Self Management and Treatment Plan  Goals and (Comments)  Goals        General    Medical (pt-stated)     Notes - Note created  11/1/2019 12:49 PM by Michela Whitaker, RN    Goal Statement: I want my shortness of breath to improve    Date goal set: 11/1/19    Measure of Success: When I am not short of breath    Barriers: COPD    Strengths: Motivated    Date to Achieve By: February 2020    Patient expressed understanding of goal: Yes    Action steps to achieve this goal  1. I will get my nebulizer fixed  2. I will use nebulizer as instructed  3. I will take prednisone as prescribed             Action Plans on File:   Asthma        Depression          Advance Care Plans/Directives Type:        My Medical and Care Information  Problem List   Patient Active Problem List   Diagnosis     Mild persistent asthma     Polyneuropathy in other diseases classified elsewhere (H)     DVT (deep venous thrombosis) (H)     Alcohol abuse, in remission     SECONDARY POLYCYTHEMIA     Chondromalacia of patella     Developmental reading disorder     Esophageal reflux     Hypothyroidism     Fatty liver     GILES (Obstructive Sleep Apnea)-Moderate (AHI 16)     RLS (restless legs syndrome)     Smoker     Erythrocytosis     Moderate mixed bipolar I disorder (H)     Personality disorder, depressive     COPD (chronic obstructive  pulmonary disease) (H)     Rosacea     Health Care Home     DDD (degenerative disc disease), cervical     Benign neoplasm of colon (POLYPOSIS)     History of stroke symptoms, stroke ruled out on imaging     Somatization disorder     Sebaceous cyst of right axilla     HTN, goal below 140/90     Left leg pain     Vitamin D deficiency     Morbid obesity (H)     PVD (peripheral vascular disease) with claudication (H)     Cyst of left ovary     Morbid obesity with alveolar hypoventilation (H)     Severe episode of recurrent major depressive disorder (H)     Conductive hearing loss of left ear with restricted hearing of right ear     Sensorineural hearing loss (SNHL) of right ear with restricted hearing of left ear     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     Cellulitis     COPD exacerbation (H)     Personal history of DVT (deep vein thrombosis)     Acute respiratory failure with hypoxia and hypercapnia (H)     Lymphedema of both lower extremities      Current Medications and Allergies:  See printed Medication Report.    Care Coordination Start Date: 11/1/2019   Frequency of Care Coordination: monthly   Form Last Updated: 11/01/2019

## 2019-11-03 ENCOUNTER — TELEPHONE (OUTPATIENT)
Dept: FAMILY MEDICINE | Facility: CLINIC | Age: 54
End: 2019-11-03

## 2019-11-03 DIAGNOSIS — J44.9 CHRONIC OBSTRUCTIVE PULMONARY DISEASE, UNSPECIFIED COPD TYPE (H): Chronic | ICD-10-CM

## 2019-11-03 RX ORDER — IPRATROPIUM BROMIDE AND ALBUTEROL SULFATE 2.5; .5 MG/3ML; MG/3ML
1 SOLUTION RESPIRATORY (INHALATION) EVERY 6 HOURS PRN
Qty: 30 VIAL | Refills: 11 | Status: SHIPPED | OUTPATIENT
Start: 2019-11-03 | End: 2020-12-08

## 2019-11-04 ENCOUNTER — DOCUMENTATION ONLY (OUTPATIENT)
Dept: SLEEP MEDICINE | Facility: CLINIC | Age: 54
End: 2019-11-04
Payer: COMMERCIAL

## 2019-11-04 DIAGNOSIS — G47.33 OSA (OBSTRUCTIVE SLEEP APNEA): Primary | ICD-10-CM

## 2019-11-04 NOTE — PROGRESS NOTES
Clinic Care Coordination Contact      Outreach to patient.    Patient states she is feeling better. She is coughing up a lot of mucous (yellow)   She states she is using the nebulizer more at night and her inhalers during the day.  She feels she gets better relief from the inhalers.     Patient states she still gets short of breath when walking. She sounds better during our conversation, not struggling for breath and able to talk in at least short sentences.    Patient was seen in Sleep clinic DME today for evaluation of pressures and mask. She had not been using her C-pap. She states she took a nap with it when she got home and she feels a lot better. She states she needs to get used to it again. She states they increased her pressure to 8 and she has a new mask that feels better.     Patient has PCP follow up appointment scheduled for 11/6/19.    Clinic care coordinator will continue to follow and outreach in 2-4 weeks.    Michela Whitaker RN, [facility] - Primary Care Clinic RN Coordinator  [facility]   11/4/2019    1:40 PM  [phone]

## 2019-11-04 NOTE — PROGRESS NOTES
"Patient came to [facility]  for CPAP troubleshoot and mask fitting appointment on November 4, 2019. Patient states that feels she is \"suffocating.\" Had patient try at each pressure 5, 6, 7, and 8 cm h20. She felt 8 was very comfortable and would like that to be her start pressure. Patient encouraged  to switch masks because current mask of F10 is no longer available in our showrooms, able to ship only. Patient agreed to try on others. Patient tried on the following masks: F20 Patient selected a Resmed Mask name: F20 Full Face mask size Small. She will pick one up at our [place] Showroom along with other supplies after she sees her sleep provider and is given orders for supplies. Will route pressure change to Dr. Bazzi.    Lima Mad[place], DME Coordinator    "

## 2019-11-06 ENCOUNTER — OFFICE VISIT (OUTPATIENT)
Dept: FAMILY MEDICINE | Facility: CLINIC | Age: 54
End: 2019-11-06
Payer: COMMERCIAL

## 2019-11-06 ENCOUNTER — ANTICOAGULATION THERAPY VISIT (OUTPATIENT)
Dept: ANTICOAGULATION | Facility: CLINIC | Age: 54
End: 2019-11-06

## 2019-11-06 ENCOUNTER — OFFICE VISIT (OUTPATIENT)
Dept: PSYCHOLOGY | Facility: CLINIC | Age: 54
End: 2019-11-06
Payer: COMMERCIAL

## 2019-11-06 VITALS
HEART RATE: 92 BPM | OXYGEN SATURATION: 96 % | WEIGHT: 236.2 LBS | RESPIRATION RATE: 36 BRPM | SYSTOLIC BLOOD PRESSURE: 133 MMHG | BODY MASS INDEX: 51.11 KG/M2 | TEMPERATURE: 98.1 F | DIASTOLIC BLOOD PRESSURE: 60 MMHG

## 2019-11-06 DIAGNOSIS — J44.1 CHRONIC OBSTRUCTIVE PULMONARY DISEASE WITH ACUTE EXACERBATION (H): Primary | Chronic | ICD-10-CM

## 2019-11-06 DIAGNOSIS — Z86.73 HISTORY OF STROKE: ICD-10-CM

## 2019-11-06 DIAGNOSIS — I82.409 DVT (DEEP VENOUS THROMBOSIS) (H): ICD-10-CM

## 2019-11-06 DIAGNOSIS — Z79.01 LONG TERM CURRENT USE OF ANTICOAGULANT THERAPY: ICD-10-CM

## 2019-11-06 DIAGNOSIS — F31.62 MODERATE MIXED BIPOLAR I DISORDER (H): Primary | ICD-10-CM

## 2019-11-06 DIAGNOSIS — I73.9 PVD (PERIPHERAL VASCULAR DISEASE) WITH CLAUDICATION (H): ICD-10-CM

## 2019-11-06 LAB
HCT VFR BLD AUTO: 51.2 % (ref 35–47)
INR PPP: 3.67 (ref 0.86–1.14)

## 2019-11-06 PROCEDURE — 90834 PSYTX W PT 45 MINUTES: CPT | Performed by: PSYCHOLOGIST

## 2019-11-06 PROCEDURE — 94640 AIRWAY INHALATION TREATMENT: CPT | Performed by: FAMILY MEDICINE

## 2019-11-06 PROCEDURE — 85014 HEMATOCRIT: CPT | Performed by: FAMILY MEDICINE

## 2019-11-06 PROCEDURE — 36415 COLL VENOUS BLD VENIPUNCTURE: CPT | Performed by: FAMILY MEDICINE

## 2019-11-06 PROCEDURE — 85610 PROTHROMBIN TIME: CPT | Performed by: FAMILY MEDICINE

## 2019-11-06 PROCEDURE — 99207 ZZC NO CHARGE NURSE ONLY: CPT

## 2019-11-06 PROCEDURE — 99214 OFFICE O/P EST MOD 30 MIN: CPT | Mod: 25 | Performed by: FAMILY MEDICINE

## 2019-11-06 RX ORDER — IPRATROPIUM BROMIDE AND ALBUTEROL SULFATE 2.5; .5 MG/3ML; MG/3ML
3 SOLUTION RESPIRATORY (INHALATION) ONCE
Status: COMPLETED | OUTPATIENT
Start: 2019-11-06 | End: 2019-11-06

## 2019-11-06 RX ADMIN — IPRATROPIUM BROMIDE AND ALBUTEROL SULFATE 3 ML: 2.5; .5 SOLUTION RESPIRATORY (INHALATION) at 13:30

## 2019-11-06 ASSESSMENT — PAIN SCALES - GENERAL: PAINLEVEL: EXTREME PAIN (8)

## 2019-11-06 NOTE — PROGRESS NOTES
SUBJECTIVE:                                                    Bia Bill is a 53 year old female who presents to clinic today for the following health issues:      Hospital Follow-up Visit:    Hospital/Nursing Home/IP Rehab Facility: [facility]  Date of Admission: 10/28/2019  Date of Discharge: 10/31/2019  Reason(s) for Admission: COPD            Problems taking medications regularly:  None       Medication changes since discharge: None       Problems adhering to non-medication therapy:  None    Summary of hospitalization:  [facility] discharge summary reviewed  Diagnostic Tests/Treatments reviewed.  Follow up needed: none  Other Healthcare Providers Involved in Patient s Care:         None  Update since discharge: improved.     Post Discharge Medication Reconciliation: discharge medications reconciled, continue medications without change.  Plan of care communicated with patient     Coding guidelines for this visit:  Type of Medical   Decision Making Face-to-Face Visit       within 7 Days of discharge Face-to-Face Visit        within 14 days of discharge   Moderate Complexity [id number]   High Complexity [id number]              Problem list and histories reviewed & adjusted, as indicated.  Additional history:     Patient Active Problem List   Diagnosis     Mild persistent asthma     Polyneuropathy in other diseases classified elsewhere (H)     DVT (deep venous thrombosis) (H)     Alcohol abuse, in remission     SECONDARY POLYCYTHEMIA     Chondromalacia of patella     Developmental reading disorder     Esophageal reflux     Hypothyroidism     Fatty liver     GILES (Obstructive Sleep Apnea)-Moderate (AHI 16)     RLS (restless legs syndrome)     Smoker     Erythrocytosis     Moderate mixed bipolar I disorder (H)     Personality disorder, depressive     COPD (chronic obstructive pulmonary disease) (H)     Rosacea     Health Care Home     DDD (degenerative disc disease), cervical     Benign  neoplasm of colon (POLYPOSIS)     History of stroke symptoms, stroke ruled out on imaging     Somatization disorder     Sebaceous cyst of right axilla     HTN, goal below 140/90     Left leg pain     Vitamin D deficiency     Morbid obesity (H)     PVD (peripheral vascular disease) with claudication (H)     Cyst of left ovary     Morbid obesity with alveolar hypoventilation (H)     Severe episode of recurrent major depressive disorder (H)     Conductive hearing loss of left ear with restricted hearing of right ear     Sensorineural hearing loss (SNHL) of right ear with restricted hearing of left ear     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     Cellulitis     COPD exacerbation (H)     Personal history of DVT (deep vein thrombosis)     Acute respiratory failure with hypoxia and hypercapnia (H)     Lymphedema of both lower extremities     Past Surgical History:   Procedure Laterality Date     BONE MARROW BIOPSY, BONE SPECIMEN, NEEDLE/TROCAR N/A 11/17/2014    Procedure: BIOPSY BONE MARROW;  Surgeon: Hay Aaron MD;  Location: [facility]     D & C  10/26/09    with uterine ablation     ENDOVASCULAR PLACEMENT VASCULAR DEVICE Left     Left leg stent x 3     ESOPHAGOSCOPY, GASTROSCOPY, DUODENOSCOPY (EGD), COMBINED  4/21/2014    Procedure: Gastroscopy;  Surgeon: Moris Thomas MD;  Location: [facility]     HYSTERECTOMY, PAP NO LONGER INDICATED  1-4-2010     LAPAROSCOPIC CHOLECYSTECTOMY N/A 1/4/2019    Procedure: Laparoscopic cholecystectomy;  Surgeon: Aaron Siegel MD;  Location: [facility]     LITHOTRIPSY  2004    Lithotrypsy       Social History     Tobacco Use     Smoking status: Current Every Day Smoker     Packs/day: 0.50     Years: 34.00     Pack years: 17.00     Types: Cigarettes     Smokeless tobacco: Never Used     Tobacco comment: started at age 11.  Quit during pregnancyX4.  Quit 3 months ago.    Substance Use Topics     Alcohol use: No     Alcohol/week: 0.0  standard drinks     Comment: quit 1995     Family History   Problem Relation Age of Onset     Hypertension Mother      Diabetes Mother      Blood Disease Mother      Heart Disease Mother         chf     Cerebrovascular Disease Mother      Hypertension Father      Cancer Father         lung cancer     Allergies Sister      Diabetes Sister      Depression Sister      Hypertension Sister          Current Outpatient Medications   Medication Sig Dispense Refill     acetaminophen (TYLENOL) 500 MG tablet Take 500-1,000 mg by mouth every 6 hours as needed for mild pain       albuterol (PROAIR HFA/PROVENTIL HFA/VENTOLIN HFA) 108 (90 Base) MCG/ACT inhaler Inhale 2 puffs into the lungs every 6 hours as needed for shortness of breath / dyspnea or wheezing 1 Inhaler 0     albuterol (PROVENTIL) (2.5 MG/3ML) 0.083% neb solution Take 1 vial (2.5 mg) by nebulization every 6 hours as needed for shortness of breath / dyspnea or wheezing 1 Box 0     allopurinol (ZYLOPRIM) 300 MG tablet Take 1 tablet (300 mg) by mouth daily 90 tablet 3     ARIPiprazole (ABILIFY) 10 MG tablet TAKE ONE TABLET BY MOUTH EVERY DAY (Patient taking differently: Take 10 mg by mouth every evening ) 90 tablet 1     buPROPion (WELLBUTRIN XL) 150 MG 24 hr tablet TAKE ONE TABLET BY MOUTH EVERY DAY IN THE EVENING (Patient taking differently: Take 150 mg by mouth every evening TAKE ONE TABLET BY MOUTH EVERY DAY IN THE EVENING) 150 tablet 0     furosemide (LASIX) 20 MG tablet TAKE TWO TABLETS BY MOUTH TWICE DAILY (Patient taking differently: Take 40 mg by mouth 2 times daily ) 360 tablet 3     gabapentin (NEURONTIN) 300 MG capsule TAKE ONE CAPSULE BY MOUTH ONCE DAILY AT BEDTIME WITH A 600 MG TABLET FOR A TOTAL DOSE  MG (Patient taking differently: Take 300 mg by mouth At Bedtime TAKE ONE CAPSULE BY MOUTH ONCE DAILY AT BEDTIME WITH A 600 MG TABLET FOR A TOTAL DOSE  MG) 90 capsule 0     gabapentin (NEURONTIN) 600 MG tablet TAKE ONE TABLET BY MOUTH THREE TIMES  A DAY (Patient taking differently: Take 600 mg by mouth 3 times daily TAKE ONE TABLET BY MOUTH THREE TIMES A DAY) 90 tablet 3     ipratropium - albuterol 0.5 mg/2.5 mg/3 mL (DUONEB) 0.5-2.5 (3) MG/3ML neb solution Take 1 vial (3 mLs) by nebulization every 6 hours as needed for shortness of breath / dyspnea or wheezing 30 vial 11     lamoTRIgine (LAMICTAL) 100 MG tablet Take 1 tablet (100 mg) by mouth daily 90 tablet 3     levothyroxine (SYNTHROID/LEVOTHROID) 150 MCG tablet Take 1 tablet (150 mcg) by mouth daily 90 tablet 2     mometasone-formoterol (DULERA) 200-5 MCG/ACT oral inhaler Inhale 2 puffs into the lungs 2 times daily 13 g 1     omeprazole (PRILOSEC) 20 MG DR capsule TAKE ONE CAPSULE BY MOUTH TWICE A DAY (Patient taking differently: Take 20 mg by mouth 2 times daily ) 180 capsule 1     order for DME Equipment being ordered:x2 [employer] 30/40mmHg compression wraps with x2 extra prs of compression liners 1 each 0     order for DME Equipment being ordered: Nebulizer 1 Device 0     order for DME Equipment being ordered: CPAP  AIRSENSE 10  5-18 CM H20  SN# [id number]   DN# [id number]       order for DME Equipment being ordered: DEPENDS SIZE LARGE 60 Month 5     order for DME Equipment being ordered: INCONTINENCE PADS   QID 1 Month 11     potassium chloride ER (K-DUR/KLOR-CON M) 10 MEQ CR tablet TAKE ONE TABLET BY MOUTH ONCE DAILY (Patient taking differently: Take 10 mEq by mouth daily ) 90 tablet 0     pramipexole (MIRAPEX) 0.125 MG tablet TAKE 1-2 TABLETS BY MOUTH AT BEDTIME 60 tablet 10     predniSONE (DELTASONE) 10 MG tablet Take  3 tabs daily x 3 days, then 2 tabs daily x 3 days then 1 tab daily x 3 days then stop. 18 tablet 0     simvastatin (ZOCOR) 20 MG tablet TAKE ONE TABLET BY MOUTH EVERY NIGHT AT BEDTIME (Patient taking differently: Take 20 mg by mouth At Bedtime ) 90 tablet 3     traZODone (DESYREL) 150 MG tablet Take 1 tablet (150 mg) by mouth At Bedtime 90 tablet 1     VITAMIN D3 1000 units tablet TAKE ONE  TABLET BY MOUTH ONCE DAILY (Patient taking differently: Take 1,000 Units by mouth daily ) 100 tablet 2     warfarin ANTICOAGULANT (JANTOVEN ANTICOAGULANT) 5 MG tablet 15 mg (5 mg x 3) every Sun, Tue, Thu; 12.5 mg (5 mg x 2.5) all other days OR AS DIRECTED BY  tablet 0     EPINEPHrine (EPIPEN/ADRENACLICK/OR ANY BX GENERIC EQUIV) 0.3 MG/0.3ML injection 2-pack Inject 0.3 mLs (0.3 mg) into the muscle once as needed for anaphylaxis (Patient not taking: Reported on 9/18/2019) 0.6 mL 0     Allergies   Allergen Reactions     Darvocet [Propoxyphene N-Apap] Anaphylaxis     Darvocet,Percocet      Percocet [Oxycodone-Acetaminophen] Anaphylaxis, Hives and Swelling     Has tolerated hydromorphone in the past.      Asa [Aspirin] Hives     Aspirin causes seizures and hives, throat swelling.   Has tolerated ketorolac in the past.        Bee      Ibuprofen Swelling     Throat swelling per patient      Lyrica [Pregabalin] Other (See Comments) and Swelling     dizziness     Povidone Iodine Rash     Reaction to topical betadine     Tape [Adhesive Tape] Rash       ROS:  Constitutional, HEENT, cardiovascular, pulmonary, gi and gu systems are negative, except as otherwise noted.    OBJECTIVE:                                                    /60   Pulse 92   Temp 98.1  F (36.7  C) (Tympanic)   Resp (!) 36   Wt 107.1 kg (236 lb 3.2 oz)   LMP 09/27/2009   SpO2 96%   BMI 51.11 kg/m   Body mass index is 51.11 kg/m .   GENERAL: healthy, alert, well nourished, well hydrated, no distress  HENT: ear canals- normal; TMs- normal; Nose- normal; Mouth- no ulcers, no lesions  NECK: no tenderness, no adenopathy, no asymmetry, no masses, no stiffness; thyroid- normal to palpation  RESP:diffuse expiratory wheezing  CV: regular rates and rhythm, normal S1 S2, no S3 or S4 and no murmur, no click or rub -  ABDOMEN: soft, no tenderness, no  hepatosplenomegaly, no masses, normal bowel sounds       ASSESSMENT/PLAN:                                                       (J44.1) Chronic obstructive pulmonary disease with acute exacerbation (H)  (primary encounter diagnosis)  Plan: ipratropium - albuterol 0.5 mg/2.5 mg/3 mL         (DUONEB) neb solution 3 mL,         INHALATION/NEBULIZER TREATMENT, INITIAL      Had good responce to neb   emnncourgared to quit smokin   reports that she has been smoking cigarettes. She has a 17.00 pack-year smoking history. She has never used smokeless tobacco.  Tobacco Cessation Action Plan: Pharmacotherapies : cold turkey    Weight management plan: Discussed healthy diet and exercise guidelines      [facility]

## 2019-11-06 NOTE — PROGRESS NOTES
ANTICOAGULATION FOLLOW-UP CLINIC VISIT    Patient Name:  Bia Bill  Date:  11/6/2019  Contact Type:  Telephone    SUBJECTIVE:  Patient Findings     Positives:   Change in health (COPD), Change in medications (Albeuterol)    Comments:   No changes in activity or diet noted. No bleeding or increased bruising noted. Took warfarin as prescribed.  Patient had 97.5 mg in the past 7 days. Writer adjusted the dose to 95 mg weekly.  Recheck in 10 days.   Patient verbalizes understanding and agrees to plan. No further questions or concerns.  Patient denies signs or symptoms of bleeding. Writer educated patient regarding increased bleed risk and when to seek immediate medical attention. Patient verbalized understanding.          Clinical Outcomes     Negatives:   Major bleeding event, Thromboembolic event, Anticoagulation-related hospital admission, Anticoagulation-related ED visit, Anticoagulation-related fatality    Comments:   No changes in activity or diet noted. No bleeding or increased bruising noted. Took warfarin as prescribed.  Patient had 97.5 mg in the past 7 days. Writer adjusted the dose to 95 mg weekly.  Recheck in 10 days.   Patient verbalizes understanding and agrees to plan. No further questions or concerns.  Patient denies signs or symptoms of bleeding. Writer educated patient regarding increased bleed risk and when to seek immediate medical attention. Patient verbalized understanding.             OBJECTIVE    INR   Date Value Ref Range Status   11/06/2019 3.67 (H) 0.86 - 1.14 Final     Comment:     Special tube used to correct for high hematocrit       ASSESSMENT / PLAN  INR assessment SUPRA    Recheck INR In: 10 DAYS    INR Location Outside lab      Anticoagulation Summary  As of 11/6/2019    INR goal:   2.0-3.0   TTR:   46.4 % (4.5 y)   INR used for dosing:   3.67! (11/6/2019)   Warfarin maintenance plan:   15 mg (5 mg x 3) every Sun, Tue, Thu; 12.5 mg (5 mg x 2.5) all other days   Full warfarin  instructions:   11/6: 12.5 mg; 11/9: 12.5 mg; Otherwise 15 mg every Sun, Tue, Thu; 12.5 mg all other days   Weekly warfarin total:   95 mg   Plan last modified:   Reina Forrest RN (11/6/2019)   Next INR check:   11/18/2019   Priority:   INR   Target end date:   Indefinite    Indications    PVD (peripheral vascular disease) with claudication (H) [I73.9]  Long term current use of anticoagulant therapy (Resolved) [Z79.01]  DVT (deep venous thrombosis) (H) [I82.409]  History of stroke symptoms  stroke ruled out on imaging [Z86.73]             Anticoagulation Episode Summary     INR check location:       Preferred lab:       Send INR reminders to:   [facility] PHONE ANTICOAG POOL    Comments:   * lab draw due to elevated hematocrit (fingerstick meters don't work). LEFT LE. STENT x 3 LEFT UPPER LEG. Previous arterial clot. AS of 10/9/19 Goal range 2.0-3.0.      Anticoagulation Care Providers     Provider Role Specialty Phone number    Kd Leong MD [facility] [phone]            See the Encounter Report to view Anticoagulation Flowsheet and Dosing Calendar (Go to Encounters tab in chart review, and find the Anticoagulation Therapy Visit)        Reina Forrest RN

## 2019-11-06 NOTE — PROGRESS NOTES
Clinic Administered Medication Documentation    MEDICATION LIST: Inhalable/Nebs Medication Documentation    Patient was given Ipratropium-Albuterol Neb. Prior to medication administration, verified patients identity using patient s name and date of birth. Please see MAR and medication order for additional information.     The following nebulizer treatment was given:     MEDICATION: Duoneb  : [employer]  LOT #: [id number]  EXPIRATION DATE:  01/2021  NDC # 3916-6486-81     Nebulizer Start Time:  1:40 pm  Nebulizer Stop Time:  1:46 pm  See Vital Signs Flowsheet    Verbal per Dr. Kd Leong.    Jennifer Harris, [facility]

## 2019-11-07 ASSESSMENT — ANXIETY QUESTIONNAIRES
6. BECOMING EASILY ANNOYED OR IRRITABLE: NEARLY EVERY DAY
2. NOT BEING ABLE TO STOP OR CONTROL WORRYING: SEVERAL DAYS
3. WORRYING TOO MUCH ABOUT DIFFERENT THINGS: SEVERAL DAYS
5. BEING SO RESTLESS THAT IT IS HARD TO SIT STILL: SEVERAL DAYS
7. FEELING AFRAID AS IF SOMETHING AWFUL MIGHT HAPPEN: NOT AT ALL
GAD7 TOTAL SCORE: 10
1. FEELING NERVOUS, ANXIOUS, OR ON EDGE: MORE THAN HALF THE DAYS
4. TROUBLE RELAXING: MORE THAN HALF THE DAYS

## 2019-11-07 ASSESSMENT — PATIENT HEALTH QUESTIONNAIRE - PHQ9: SUM OF ALL RESPONSES TO PHQ QUESTIONS 1-9: 11
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
## 2019-11-08 ENCOUNTER — OFFICE VISIT (OUTPATIENT)
Dept: FAMILY MEDICINE | Facility: CLINIC | Age: 54
End: 2019-11-08
Payer: COMMERCIAL

## 2019-11-08 ENCOUNTER — APPOINTMENT (OUTPATIENT)
Dept: GENERAL RADIOLOGY | Facility: CLINIC | Age: 54
End: 2019-11-08
Attending: FAMILY MEDICINE
Payer: COMMERCIAL

## 2019-11-08 ENCOUNTER — HOSPITAL ENCOUNTER (OUTPATIENT)
Facility: CLINIC | Age: 54
Setting detail: OBSERVATION
Discharge: HOME | End: 2019-11-10
Attending: FAMILY MEDICINE | Admitting: INTERNAL MEDICINE
Payer: COMMERCIAL

## 2019-11-08 VITALS
HEART RATE: 80 BPM | BODY MASS INDEX: 51.72 KG/M2 | DIASTOLIC BLOOD PRESSURE: 56 MMHG | OXYGEN SATURATION: 95 % | TEMPERATURE: 97.7 F | WEIGHT: 239 LBS | SYSTOLIC BLOOD PRESSURE: 124 MMHG | RESPIRATION RATE: 28 BRPM

## 2019-11-08 DIAGNOSIS — J44.1 COPD EXACERBATION (H): ICD-10-CM

## 2019-11-08 DIAGNOSIS — J40 BRONCHITIS: Primary | ICD-10-CM

## 2019-11-08 DIAGNOSIS — R07.89 CHEST PRESSURE: ICD-10-CM

## 2019-11-08 DIAGNOSIS — M79.662 PAIN OF LEFT LOWER LEG: ICD-10-CM

## 2019-11-08 DIAGNOSIS — R06.02 SOB (SHORTNESS OF BREATH): Primary | ICD-10-CM

## 2019-11-08 DIAGNOSIS — F17.210 CIGARETTE SMOKER: ICD-10-CM

## 2019-11-08 LAB
ALBUMIN SERPL-MCNC: 3.2 G/DL (ref 3.4–5)
ALP SERPL-CCNC: 123 U/L (ref 40–150)
ALT SERPL W P-5'-P-CCNC: 32 U/L (ref 0–50)
ANION GAP SERPL CALCULATED.3IONS-SCNC: 3 MMOL/L (ref 3–14)
AST SERPL W P-5'-P-CCNC: 10 U/L (ref 0–45)
BASE EXCESS BLDV CALC-SCNC: 11.1 MMOL/L
BASOPHILS # BLD AUTO: 0 10E9/L (ref 0–0.2)
BASOPHILS NFR BLD AUTO: 0.5 %
BILIRUB SERPL-MCNC: 0.5 MG/DL (ref 0.2–1.3)
BUN SERPL-MCNC: 14 MG/DL (ref 7–30)
CALCIUM SERPL-MCNC: 9 MG/DL (ref 8.5–10.1)
CHLORIDE SERPL-SCNC: 102 MMOL/L (ref 94–109)
CO2 SERPL-SCNC: 36 MMOL/L (ref 20–32)
CREAT SERPL-MCNC: 0.89 MG/DL (ref 0.52–1.04)
D DIMER PPP FEU-MCNC: <0.3 UG/ML FEU (ref 0–0.5)
DIFFERENTIAL METHOD BLD: ABNORMAL
EOSINOPHIL # BLD AUTO: 0.1 10E9/L (ref 0–0.7)
EOSINOPHIL NFR BLD AUTO: 0.8 %
ERYTHROCYTE [DISTWIDTH] IN BLOOD BY AUTOMATED COUNT: 19.8 % (ref 10–15)
GFR SERPL CREATININE-BSD FRML MDRD: 74 ML/MIN/{1.73_M2}
GLUCOSE SERPL-MCNC: 158 MG/DL (ref 70–99)
HCO3 BLDV-SCNC: 38 MMOL/L (ref 21–28)
HCT VFR BLD AUTO: 53.1 % (ref 35–47)
HGB BLD-MCNC: 15.3 G/DL (ref 11.7–15.7)
IMM GRANULOCYTES # BLD: 0 10E9/L (ref 0–0.4)
IMM GRANULOCYTES NFR BLD: 0.4 %
LYMPHOCYTES # BLD AUTO: 1.6 10E9/L (ref 0.8–5.3)
LYMPHOCYTES NFR BLD AUTO: 20.2 %
MCH RBC QN AUTO: 25.7 PG (ref 26.5–33)
MCHC RBC AUTO-ENTMCNC: 28.8 G/DL (ref 31.5–36.5)
MCV RBC AUTO: 89 FL (ref 78–100)
MONOCYTES # BLD AUTO: 0.5 10E9/L (ref 0–1.3)
MONOCYTES NFR BLD AUTO: 6.3 %
NEUTROPHILS # BLD AUTO: 5.5 10E9/L (ref 1.6–8.3)
NEUTROPHILS NFR BLD AUTO: 71.8 %
NRBC # BLD AUTO: 0 10*3/UL
NRBC BLD AUTO-RTO: 0 /100
NT-PROBNP SERPL-MCNC: 58 PG/ML (ref 0–900)
O2/TOTAL GAS SETTING VFR VENT: ABNORMAL %
PCO2 BLDV: 57 MM HG (ref 40–50)
PH BLDV: 7.43 PH (ref 7.32–7.43)
PLATELET # BLD AUTO: 134 10E9/L (ref 150–450)
PO2 BLDV: 23 MM HG (ref 25–47)
POTASSIUM SERPL-SCNC: 3.5 MMOL/L (ref 3.4–5.3)
PROT SERPL-MCNC: 6.4 G/DL (ref 6.8–8.8)
RBC # BLD AUTO: 5.96 10E12/L (ref 3.8–5.2)
SODIUM SERPL-SCNC: 141 MMOL/L (ref 133–144)
WBC # BLD AUTO: 7.7 10E9/L (ref 4–11)

## 2019-11-08 PROCEDURE — 87640 STAPH A DNA AMP PROBE: CPT | Performed by: INTERNAL MEDICINE

## 2019-11-08 PROCEDURE — 82803 BLOOD GASES ANY COMBINATION: CPT | Performed by: FAMILY MEDICINE

## 2019-11-08 PROCEDURE — 25000128 H RX IP 250 OP 636: Performed by: FAMILY MEDICINE

## 2019-11-08 PROCEDURE — 85379 FIBRIN DEGRADATION QUANT: CPT | Performed by: FAMILY MEDICINE

## 2019-11-08 PROCEDURE — 94640 AIRWAY INHALATION TREATMENT: CPT | Performed by: FAMILY MEDICINE

## 2019-11-08 PROCEDURE — 85025 COMPLETE CBC W/AUTO DIFF WBC: CPT | Performed by: FAMILY MEDICINE

## 2019-11-08 PROCEDURE — 12000011 ZZH R&B MS OVERFLOW

## 2019-11-08 PROCEDURE — 25000125 ZZHC RX 250: Performed by: FAMILY MEDICINE

## 2019-11-08 PROCEDURE — 40000809 ZZH STATISTIC NO DOCUMENTATION TO SUPPORT CHARGE: Performed by: FAMILY MEDICINE

## 2019-11-08 PROCEDURE — 96375 TX/PRO/DX INJ NEW DRUG ADDON: CPT | Performed by: FAMILY MEDICINE

## 2019-11-08 PROCEDURE — 99285 EMERGENCY DEPT VISIT HI MDM: CPT | Mod: 25 | Performed by: FAMILY MEDICINE

## 2019-11-08 PROCEDURE — 80053 COMPREHEN METABOLIC PANEL: CPT | Performed by: FAMILY MEDICINE

## 2019-11-08 PROCEDURE — 96365 THER/PROPH/DIAG IV INF INIT: CPT | Performed by: FAMILY MEDICINE

## 2019-11-08 PROCEDURE — 40000270 ZZH STATISTIC OXYGEN  O2DAILY TECH TIME

## 2019-11-08 PROCEDURE — 25000132 ZZH RX MED GY IP 250 OP 250 PS 637: Performed by: PHYSICIAN ASSISTANT

## 2019-11-08 PROCEDURE — 94640 AIRWAY INHALATION TREATMENT: CPT | Mod: 76

## 2019-11-08 PROCEDURE — 83880 ASSAY OF NATRIURETIC PEPTIDE: CPT | Performed by: FAMILY MEDICINE

## 2019-11-08 PROCEDURE — 99284 EMERGENCY DEPT VISIT MOD MDM: CPT | Mod: Z6 | Performed by: FAMILY MEDICINE

## 2019-11-08 PROCEDURE — 87641 MR-STAPH DNA AMP PROBE: CPT | Performed by: INTERNAL MEDICINE

## 2019-11-08 PROCEDURE — 25000131 ZZH RX MED GY IP 250 OP 636 PS 637: Performed by: FAMILY MEDICINE

## 2019-11-08 PROCEDURE — 71046 X-RAY EXAM CHEST 2 VIEWS: CPT

## 2019-11-08 PROCEDURE — 25000132 ZZH RX MED GY IP 250 OP 250 PS 637: Performed by: FAMILY MEDICINE

## 2019-11-08 PROCEDURE — 40000275 ZZH STATISTIC RCP TIME EA 10 MIN

## 2019-11-08 PROCEDURE — 94640 AIRWAY INHALATION TREATMENT: CPT

## 2019-11-08 PROCEDURE — 99207 ZZC OFFICE-HOSPITAL ADMIT: CPT | Performed by: PHYSICIAN ASSISTANT

## 2019-11-08 RX ORDER — HYDROCODONE BITARTRATE AND ACETAMINOPHEN 5; 325 MG/1; MG/1
1-2 TABLET ORAL EVERY 4 HOURS PRN
Status: DISCONTINUED | OUTPATIENT
Start: 2019-11-08 | End: 2019-11-10 | Stop reason: HOSPADM

## 2019-11-08 RX ORDER — ALLOPURINOL 300 MG/1
300 TABLET ORAL DAILY
Status: DISCONTINUED | OUTPATIENT
Start: 2019-11-09 | End: 2019-11-10 | Stop reason: HOSPADM

## 2019-11-08 RX ORDER — ACETAMINOPHEN 325 MG/1
650 TABLET ORAL EVERY 4 HOURS PRN
Status: DISCONTINUED | OUTPATIENT
Start: 2019-11-08 | End: 2019-11-10 | Stop reason: HOSPADM

## 2019-11-08 RX ORDER — GABAPENTIN 600 MG/1
600 TABLET ORAL 3 TIMES DAILY
Status: DISCONTINUED | OUTPATIENT
Start: 2019-11-08 | End: 2019-11-10 | Stop reason: HOSPADM

## 2019-11-08 RX ORDER — HYDROMORPHONE HYDROCHLORIDE 1 MG/ML
0.2 INJECTION, SOLUTION INTRAMUSCULAR; INTRAVENOUS; SUBCUTANEOUS
Status: DISCONTINUED | OUTPATIENT
Start: 2019-11-08 | End: 2019-11-10 | Stop reason: HOSPADM

## 2019-11-08 RX ORDER — LIDOCAINE 40 MG/G
CREAM TOPICAL
Status: DISCONTINUED | OUTPATIENT
Start: 2019-11-08 | End: 2019-11-10 | Stop reason: HOSPADM

## 2019-11-08 RX ORDER — ONDANSETRON 4 MG/1
4 TABLET, ORALLY DISINTEGRATING ORAL EVERY 6 HOURS PRN
Status: DISCONTINUED | OUTPATIENT
Start: 2019-11-08 | End: 2019-11-10 | Stop reason: HOSPADM

## 2019-11-08 RX ORDER — KETOROLAC TROMETHAMINE 30 MG/ML
30 INJECTION, SOLUTION INTRAMUSCULAR; INTRAVENOUS ONCE
Status: COMPLETED | OUTPATIENT
Start: 2019-11-08 | End: 2019-11-08

## 2019-11-08 RX ORDER — PRAMIPEXOLE DIHYDROCHLORIDE 0.12 MG/1
.125-.25 TABLET ORAL AT BEDTIME
Status: DISCONTINUED | OUTPATIENT
Start: 2019-11-08 | End: 2019-11-10 | Stop reason: HOSPADM

## 2019-11-08 RX ORDER — IPRATROPIUM BROMIDE AND ALBUTEROL SULFATE 2.5; .5 MG/3ML; MG/3ML
3 SOLUTION RESPIRATORY (INHALATION)
Status: DISCONTINUED | OUTPATIENT
Start: 2019-11-08 | End: 2019-11-10 | Stop reason: HOSPADM

## 2019-11-08 RX ORDER — NALOXONE HYDROCHLORIDE 0.4 MG/ML
.1-.4 INJECTION, SOLUTION INTRAMUSCULAR; INTRAVENOUS; SUBCUTANEOUS
Status: DISCONTINUED | OUTPATIENT
Start: 2019-11-08 | End: 2019-11-10 | Stop reason: HOSPADM

## 2019-11-08 RX ORDER — ALBUTEROL SULFATE 0.83 MG/ML
2.5 SOLUTION RESPIRATORY (INHALATION) ONCE
Status: COMPLETED | OUTPATIENT
Start: 2019-11-08 | End: 2019-11-08

## 2019-11-08 RX ORDER — LEVOTHYROXINE SODIUM 75 UG/1
150 TABLET ORAL
Status: DISCONTINUED | OUTPATIENT
Start: 2019-11-09 | End: 2019-11-10 | Stop reason: HOSPADM

## 2019-11-08 RX ORDER — ONDANSETRON 2 MG/ML
4 INJECTION INTRAMUSCULAR; INTRAVENOUS EVERY 6 HOURS PRN
Status: DISCONTINUED | OUTPATIENT
Start: 2019-11-08 | End: 2019-11-10 | Stop reason: HOSPADM

## 2019-11-08 RX ORDER — NALOXONE HYDROCHLORIDE 0.4 MG/ML
.1-.4 INJECTION, SOLUTION INTRAMUSCULAR; INTRAVENOUS; SUBCUTANEOUS
Status: DISCONTINUED | OUTPATIENT
Start: 2019-11-08 | End: 2019-11-09

## 2019-11-08 RX ORDER — LAMOTRIGINE 100 MG/1
100 TABLET ORAL DAILY
Status: DISCONTINUED | OUTPATIENT
Start: 2019-11-09 | End: 2019-11-10 | Stop reason: HOSPADM

## 2019-11-08 RX ORDER — ALBUTEROL SULFATE 0.83 MG/ML
2.5 SOLUTION RESPIRATORY (INHALATION)
Status: DISCONTINUED | OUTPATIENT
Start: 2019-11-08 | End: 2019-11-10 | Stop reason: HOSPADM

## 2019-11-08 RX ORDER — PREDNISONE 20 MG/1
40 TABLET ORAL ONCE
Status: COMPLETED | OUTPATIENT
Start: 2019-11-08 | End: 2019-11-08

## 2019-11-08 RX ORDER — SIMVASTATIN 20 MG
20 TABLET ORAL AT BEDTIME
Status: DISCONTINUED | OUTPATIENT
Start: 2019-11-08 | End: 2019-11-10 | Stop reason: HOSPADM

## 2019-11-08 RX ORDER — ARIPIPRAZOLE 10 MG/1
10 TABLET ORAL EVERY EVENING
Status: DISCONTINUED | OUTPATIENT
Start: 2019-11-09 | End: 2019-11-10 | Stop reason: HOSPADM

## 2019-11-08 RX ORDER — POTASSIUM CHLORIDE 750 MG/1
10 TABLET, EXTENDED RELEASE ORAL DAILY
Status: DISCONTINUED | OUTPATIENT
Start: 2019-11-09 | End: 2019-11-10 | Stop reason: HOSPADM

## 2019-11-08 RX ORDER — PREDNISONE 20 MG/1
40 TABLET ORAL DAILY
Status: DISCONTINUED | OUTPATIENT
Start: 2019-11-09 | End: 2019-11-10 | Stop reason: HOSPADM

## 2019-11-08 RX ORDER — IPRATROPIUM BROMIDE AND ALBUTEROL SULFATE 2.5; .5 MG/3ML; MG/3ML
3 SOLUTION RESPIRATORY (INHALATION) ONCE
Status: COMPLETED | OUTPATIENT
Start: 2019-11-08 | End: 2019-11-08

## 2019-11-08 RX ORDER — ACETAMINOPHEN 500 MG
500-1000 TABLET ORAL EVERY 6 HOURS PRN
Status: DISCONTINUED | OUTPATIENT
Start: 2019-11-08 | End: 2019-11-10 | Stop reason: HOSPADM

## 2019-11-08 RX ORDER — BUPROPION HYDROCHLORIDE 150 MG/1
150 TABLET ORAL EVERY EVENING
Status: DISCONTINUED | OUTPATIENT
Start: 2019-11-09 | End: 2019-11-10 | Stop reason: HOSPADM

## 2019-11-08 RX ORDER — GABAPENTIN 300 MG/1
300 CAPSULE ORAL AT BEDTIME
Status: DISCONTINUED | OUTPATIENT
Start: 2019-11-08 | End: 2019-11-10 | Stop reason: HOSPADM

## 2019-11-08 RX ORDER — CEFTRIAXONE SODIUM 1 G/50ML
1 INJECTION, SOLUTION INTRAVENOUS ONCE
Status: COMPLETED | OUTPATIENT
Start: 2019-11-08 | End: 2019-11-08

## 2019-11-08 RX ORDER — HYDROCODONE BITARTRATE AND ACETAMINOPHEN 5; 325 MG/1; MG/1
1 TABLET ORAL ONCE
Status: COMPLETED | OUTPATIENT
Start: 2019-11-08 | End: 2019-11-08

## 2019-11-08 RX ORDER — FUROSEMIDE 40 MG
40 TABLET ORAL 2 TIMES DAILY
Status: DISCONTINUED | OUTPATIENT
Start: 2019-11-08 | End: 2019-11-09

## 2019-11-08 RX ADMIN — GABAPENTIN 300 MG: 300 CAPSULE ORAL at 23:39

## 2019-11-08 RX ADMIN — IPRATROPIUM BROMIDE AND ALBUTEROL SULFATE 3 ML: .5; 3 SOLUTION RESPIRATORY (INHALATION) at 16:21

## 2019-11-08 RX ADMIN — PRAMIPEXOLE DIHYDROCHLORIDE 0.25 MG: 0.12 TABLET ORAL at 23:39

## 2019-11-08 RX ADMIN — CEFTRIAXONE SODIUM 1 G: 1 INJECTION, SOLUTION INTRAVENOUS at 18:11

## 2019-11-08 RX ADMIN — ALBUTEROL SULFATE 2.5 MG: 2.5 SOLUTION RESPIRATORY (INHALATION) at 18:15

## 2019-11-08 RX ADMIN — PREDNISONE 40 MG: 20 TABLET ORAL at 20:04

## 2019-11-08 RX ADMIN — GABAPENTIN 600 MG: 600 TABLET, FILM COATED ORAL at 23:38

## 2019-11-08 RX ADMIN — TRAZODONE HYDROCHLORIDE 150 MG: 50 TABLET ORAL at 23:39

## 2019-11-08 RX ADMIN — SIMVASTATIN 20 MG: 20 TABLET, FILM COATED ORAL at 23:39

## 2019-11-08 RX ADMIN — HYDROCODONE BITARTRATE AND ACETAMINOPHEN 1 TABLET: 5; 325 TABLET ORAL at 23:39

## 2019-11-08 RX ADMIN — HYDROCODONE BITARTRATE AND ACETAMINOPHEN 1 TABLET: 5; 325 TABLET ORAL at 18:10

## 2019-11-08 RX ADMIN — OMEPRAZOLE 20 MG: 20 CAPSULE, DELAYED RELEASE ORAL at 23:39

## 2019-11-08 RX ADMIN — KETOROLAC TROMETHAMINE 30 MG: 30 INJECTION, SOLUTION INTRAMUSCULAR at 19:32

## 2019-11-08 ASSESSMENT — ENCOUNTER SYMPTOMS
COUGH: 1
RHINORRHEA: 1
DIAPHORESIS: 1
FATIGUE: 1
DIARRHEA: 0
CHILLS: 1
ABDOMINAL PAIN: 0
WHEEZING: 1
NAUSEA: 0
SHORTNESS OF BREATH: 1
VOMITING: 0
SORE THROAT: 1
FEVER: 1
APPETITE CHANGE: 0

## 2019-11-08 ASSESSMENT — MIFFLIN-ST. JEOR
SCORE: 1583.85
SCORE: 1570.75

## 2019-11-08 ASSESSMENT — PAIN DESCRIPTION - DESCRIPTORS: DESCRIPTORS: ACHING;DISCOMFORT;NAGGING

## 2019-11-08 ASSESSMENT — ANXIETY QUESTIONNAIRES: GAD7 TOTAL SCORE: 10

## 2019-11-08 NOTE — ED PROVIDER NOTES
History     Chief Complaint   Patient presents with     Shortness of Breath     HPI  Bia Bill is a 53 year old female who presents with complaint of shortness of breath.  She notes that for the last 2 weeks has not been feeling well.  She is had a hard time catching her breath.  She has had a cough which has been productive.  She has wheezing.  She has had some upper respiratory symptoms of runny and stuffy nose and sore throat.  She denies fever chills or sweats.  She has lower extremity edema with left greater than right.  She is on anticoagulation and last had her Coumadin tested 3 days ago and it was hypertherapeutic.  She does have some pain in her chest from all the coughing and the pain is deafly made worse by taking a deep breath.    Her PMH is complex.  Includes history of DVT and COPD and acute respiratory failure.    Allergies:  Allergies   Allergen Reactions     Darvocet [Propoxyphene N-Apap] Anaphylaxis     Darvocet,Percocet      Percocet [Oxycodone-Acetaminophen] Anaphylaxis, Hives and Swelling     Has tolerated hydromorphone in the past.      Asa [Aspirin] Hives     Aspirin causes seizures and hives, throat swelling.   Has tolerated ketorolac in the past.        Bee      Ibuprofen Swelling     Throat swelling per patient      Lyrica [Pregabalin] Other (See Comments) and Swelling     dizziness     Povidone Iodine Rash     Reaction to topical betadine     Tape [Adhesive Tape] Rash       Problem List:    Patient Active Problem List    Diagnosis Date Noted     COPD exacerbation (H) 10/29/2019     Priority: Medium     Personal history of DVT (deep vein thrombosis) 10/29/2019     Priority: Medium     She had a DVT post pregnancy and delivery in 1992.   3/26/2004: Hospitalized at [facility] for extensive left lower leg DVT.  This occurred a month after pneumonia episode and decreased activity.  She had lytic therapy, tPA followed by Possis mechanical thrombectomy device and three stents in veins.   She did have Groshong catheter at the time.  There was a positive lupus anticoagulant, negative Factor V and cardiolipin clement.        Acute respiratory failure with hypoxia and hypercapnia (H) 10/29/2019     Priority: Medium     Lymphedema of both lower extremities 10/29/2019     Priority: Medium     Cellulitis 06/06/2019     Priority: Medium     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 10/03/2018     Priority: Medium     Conductive hearing loss of left ear with restricted hearing of right ear 04/17/2018     Priority: Medium     Sensorineural hearing loss (SNHL) of right ear with restricted hearing of left ear 04/17/2018     Priority: Medium     Morbid obesity with alveolar hypoventilation (H) 02/01/2017     Priority: Medium     Cyst of left ovary 01/11/2016     Priority: Medium     PVD (peripheral vascular disease) with claudication (H) 10/30/2015     Priority: Medium     LEFT LE. STENT x 3 LEFT UPPER LEG       Morbid obesity (H) 06/17/2015     Priority: Medium     Vitamin D deficiency 11/05/2014     Priority: Medium     Problem list name updated by automated process. Provider to review       Sebaceous cyst of right axilla 10/14/2014     Priority: Medium     HTN, goal below 140/90 10/14/2014     Priority: Medium     Left leg pain 10/14/2014     Priority: Medium     History of stroke symptoms, stroke ruled out on imaging 07/10/2014     Priority: Medium     Admissions in 2011 and 2014 for stroke-like symptoms. Evaluated by neurology on both occasions, imaging not consistent with stroke. Thought to be due to conversion disorder in 2011.       Somatization disorder 07/10/2014     Priority: Medium     Benign neoplasm of colon (POLYPOSIS) 06/04/2014     Priority: Medium     Specimen #: [id number]   Collected: 11/7/2017   Received: 11/7/2017   Reported: 11/9/2017 19:59   Ordering Phy(s): CARMEL GALLOWAY     For improved result formatting, select 'View Enhanced Report Format'   under  Linked Documents section.     SPECIMEN(S):   Colon polyp, 30 cm     FINAL DIAGNOSIS:   Colon at 30 cm, mucosal biopsy:   - Hyperplastic polyp, with focal changes suggestive of sessile serrated   adenoma.     Electronically signed out by:     Eric Rivas M.D.  = per Surveillance Recommendations:  Repeat in 5 yrs.    Notes Recorded by Moris Thomas MD on 4/25/2014 at 1:36 PM  Adenomatous polyp colonoscopy 5 years         DDD (degenerative disc disease), cervical 12/17/2013     Priority: Medium     Health Care Home 09/03/2013     Priority: Medium     Status:  Accepted  Care Coordinator:  Alla Fernandez    See Letters for H Care Plan  Date:  October 20, 2014         Rosacea 08/26/2013     Priority: Medium     COPD (chronic obstructive pulmonary disease) (H) 08/23/2012     Priority: Medium     Pulmonary Function test by Sylvester Erickson MD on 9/13/2013 3:38 PM   IDENTIFICATION: Bia Bill is a 47-year-old female with obesity and a history of tobacco use.   RESULTS:   1. Spirometry: FEV1 moderately reduced. FVC moderately reduced. FEV1/FVC ratio normal.   2. Bronchodilator Response: No significant change is seen with bronchodilators.   INTERPRETATION: Moderate reduction in FEV1 and FVC with well-preserved FEV1/FVC ratio. This can be seen in the setting of obstructive lung disease with air trapping or with restrictive lung disease. Clinical correlation is needed. Further testing with lung volumes and DLCO may be useful.   SYLVESTER ERICKSON MD          Moderate mixed bipolar I disorder (H) 10/13/2011     Priority: Medium     Problem list name updated by automated process. Provider to review       Personality disorder, depressive 10/13/2011     Priority: Medium     Erythrocytosis      Priority: Medium     Smoker 04/01/2011     Priority: Medium     3/2011  Not interested in quitting at this time.   Will consider and let us know how we can be of assistance.   Understands long term risks associated with same and  increased risk of blood clot formation.        GILES (Obstructive Sleep Apnea)-Moderate (AHI 16) 03/01/2010     Priority: Medium     RLS (restless legs syndrome) 03/01/2010     Priority: Medium     Ferritin 11.       Fatty liver 08/28/2009     Priority: Medium     US on 8/26/2009        Severe episode of recurrent major depressive disorder (H) 11/03/2008     Priority: Medium     Hypothyroidism 05/13/2008     Priority: Medium     was hyperthyroid - Hashimoto's and graves and had iodine treatment done at [place] in early 2008.    Now hypothyroid - is following at [facility], on 125mcg levothyroxine - see scanned documents       Esophageal reflux 04/29/2008     Priority: Medium     Developmental reading disorder 09/12/2007     Priority: Medium     Problem list name updated by automated process. Provider to review       Chondromalacia of patella 02/27/2007     Priority: Medium     SECONDARY POLYCYTHEMIA      Priority: Medium     Betheda HGB, a high-oxygen affinity hemoglobin which results in a normal compensatory erythrocytosis.  There is no specific therapy needed.  I did order a hemoglobin electrophoresis today to help confirm this in Bia, as she tells me she has never had this test performed. Dr. Riley 1/17/07       Alcohol abuse, in remission 08/01/2006     Priority: Medium     Quit 89 [address] ordered, lost her children,        Mild persistent asthma 07/11/2006     Priority: Medium     Polyneuropathy in other diseases classified elsewhere (H) 07/11/2006     Priority: Medium     Has had chronic pain in the left groin and upper leg secondary to a blood clot in the thigh, treated with neurontin without benefit, still has chronic pain since 2005, sharp shooting intermittantly.    MRI/MRA 12/07 - [facility] - see scanned documents   - had mild nonspecific white matter changes  Otherwise negative     MRI c- spine [facility] 12/07 - no cervical cord abn.  Small disc protrusion C6-C7, no impingement       DVT (deep venous  thrombosis) (H) 07/11/2006     Priority: Medium     She had a DVT post pregnancy and delivery in 1992.   3/26/2004:Hospitalized at [facility] for extensive left lower leg DVT.  This occurred a month after pneumonia episode and decreased activity.  She had lytic therapy, tPA followed by Possis mechanical thrombectomy device and three stents in veins.  She did have Groshong catheter at the time.  There was a positive lupus anticoagulant, negative Factor V and cardiolipin clement.           Past Medical History:    Past Medical History:   Diagnosis Date     Acromioclavicular joint arthritis 1/25/2017     Acute bronchospasm 8/15/2016     Acute gouty arthritis 8/4/2014     Anxiety state, unspecified      Bipolar I disorder, most recent episode (or current) unspecified      Closed fracture of metatarsal bone 6/5/2007     Depressive disorder, not elsewhere classified      DVT, lower extremity (H)      Headache 10/17/2014     Impingement syndrome of shoulder region 1/25/2017     Polycythemia, secondary      Right shoulder pain 10/14/2014     Trochanteric bursitis of both hips 1/11/2016       Past Surgical History:    Past Surgical History:   Procedure Laterality Date     BONE MARROW BIOPSY, BONE SPECIMEN, NEEDLE/TROCAR N/A 11/17/2014    Procedure: BIOPSY BONE MARROW;  Surgeon: Hay Aaron MD;  Location: [facility]     D & C  10/26/09    with uterine ablation     ENDOVASCULAR PLACEMENT VASCULAR DEVICE Left     Left leg stent x 3     ESOPHAGOSCOPY, GASTROSCOPY, DUODENOSCOPY (EGD), COMBINED  4/21/2014    Procedure: Gastroscopy;  Surgeon: Moris Thomas MD;  Location: [facility]     HYSTERECTOMY, PAP NO LONGER INDICATED  1-4-2010     LAPAROSCOPIC CHOLECYSTECTOMY N/A 1/4/2019    Procedure: Laparoscopic cholecystectomy;  Surgeon: Aaron Siegel MD;  Location: [facility]     LITHOTRIPSY  2004    Lithotrypsy       Family History:    Family History   Problem Relation Age of Onset     Hypertension Mother      Diabetes Mother       Blood Disease Mother      Heart Disease Mother         chf     Cerebrovascular Disease Mother      Hypertension Father      Cancer Father         lung cancer     Allergies Sister      Diabetes Sister      Depression Sister      Hypertension Sister        Social History:  Marital Status:   [4]  Social History     Tobacco Use     Smoking status: Current Every Day Smoker     Packs/day: 0.50     Years: 34.00     Pack years: 17.00     Types: Cigarettes     Smokeless tobacco: Never Used     Tobacco comment: started at age 11.  Quit during pregnancyX4.  Quit 3 months ago.    Substance Use Topics     Alcohol use: No     Alcohol/week: 0.0 standard drinks     Comment: quit 1995     Drug use: No     Comment: past hx 22 years ago/ uppers downers, canibus        Medications:    acetaminophen (TYLENOL) 500 MG tablet  albuterol (PROAIR HFA/PROVENTIL HFA/VENTOLIN HFA) 108 (90 Base) MCG/ACT inhaler  albuterol (PROVENTIL) (2.5 MG/3ML) 0.083% neb solution  allopurinol (ZYLOPRIM) 300 MG tablet  ARIPiprazole (ABILIFY) 10 MG tablet  buPROPion (WELLBUTRIN XL) 150 MG 24 hr tablet  EPINEPHrine (EPIPEN/ADRENACLICK/OR ANY BX GENERIC EQUIV) 0.3 MG/0.3ML injection 2-pack  furosemide (LASIX) 20 MG tablet  gabapentin (NEURONTIN) 300 MG capsule  gabapentin (NEURONTIN) 600 MG tablet  ipratropium - albuterol 0.5 mg/2.5 mg/3 mL (DUONEB) 0.5-2.5 (3) MG/3ML neb solution  lamoTRIgine (LAMICTAL) 100 MG tablet  levothyroxine (SYNTHROID/LEVOTHROID) 150 MCG tablet  mometasone-formoterol (DULERA) 200-5 MCG/ACT oral inhaler  omeprazole (PRILOSEC) 20 MG DR capsule  order for DME  order for DME  order for DME  order for DME  order for DME  potassium chloride ER (K-DUR/KLOR-CON M) 10 MEQ CR tablet  pramipexole (MIRAPEX) 0.125 MG tablet  predniSONE (DELTASONE) 10 MG tablet  simvastatin (ZOCOR) 20 MG tablet  traZODone (DESYREL) 150 MG tablet  VITAMIN D3 1000 units tablet  warfarin ANTICOAGULANT (JANTOVEN ANTICOAGULANT) 5 MG tablet          Review of  "Systems   Constitutional: Positive for chills, diaphoresis, fatigue and fever. Negative for appetite change.   HENT: Positive for congestion, postnasal drip, rhinorrhea and sore throat. Negative for ear pain.    Respiratory: Positive for cough, shortness of breath and wheezing.    Cardiovascular: Positive for chest pain and leg swelling.   Gastrointestinal: Negative for abdominal pain, diarrhea, nausea and vomiting.   Endocrine: Positive for polyuria.   Skin: Negative for rash.       Physical Exam   BP: (!) 155/71  Pulse: 83  Heart Rate: 78  Temp: 97.7  F (36.5  C)  Resp: 20  Height: 147.3 cm (4' 10\")  Weight: 108.4 kg (239 lb)  SpO2: 94 %      Physical Exam  Vitals signs and nursing note reviewed.   Constitutional:       General: She is not in acute distress.     Appearance: She is well-developed. She is obese. She is not ill-appearing, toxic-appearing or diaphoretic.   HENT:      Head: Normocephalic and atraumatic.      Mouth/Throat:      Pharynx: Oropharynx is clear. No pharyngeal swelling or oropharyngeal exudate.   Eyes:      Pupils: Pupils are equal, round, and reactive to light.   Neck:      Musculoskeletal: Normal range of motion and neck supple.   Cardiovascular:      Rate and Rhythm: Normal rate and regular rhythm.   Pulmonary:      Effort: Pulmonary effort is normal. No tachypnea, bradypnea or accessory muscle usage.      Breath sounds: Wheezing and rales present.   Abdominal:      Palpations: Abdomen is soft.      Tenderness: There is no tenderness.   Musculoskeletal: Normal range of motion.      Right lower leg: Edema present.      Left lower leg: She exhibits tenderness. Edema present.   Lymphadenopathy:      Cervical: No cervical adenopathy.   Skin:     General: Skin is warm and dry.   Neurological:      General: No focal deficit present.      Mental Status: She is alert.   Psychiatric:         Mood and Affect: Mood normal.         ED Course        Procedures               Critical Care time:  none   "    Patient had negative screen for blood clot.  Her chest x-ray did not show any acute infiltrates.  She had profound wheezing and rhonchi.  She is presumed to have an acute exacerbation of chronic bronchitis and treated with a dose of IV antibiotics, and nebulizers.    Patient was ambulated without oxygen and dropped into the 80s.  She also became quite dyspneic with exertion.  And she had an exacerbation of coughing with resulting fairly marked pain in the chest.    Given degree of dyspnea and decreased oxygenation, will admit.  Discussed with hospitalist.         Results for orders placed or performed during the hospital encounter of 11/08/19 (from the past 24 hour(s))   CBC with platelets differential   Result Value Ref Range    WBC 7.7 4.0 - 11.0 10e9/L    RBC Count 5.96 (H) 3.8 - 5.2 10e12/L    Hemoglobin 15.3 11.7 - 15.7 g/dL    Hematocrit 53.1 (H) 35.0 - 47.0 %    MCV 89 78 - 100 fl    MCH 25.7 (L) 26.5 - 33.0 pg    MCHC 28.8 (L) 31.5 - 36.5 g/dL    RDW 19.8 (H) 10.0 - 15.0 %    Platelet Count 134 (L) 150 - 450 10e9/L    Diff Method Automated Method     % Neutrophils 71.8 %    % Lymphocytes 20.2 %    % Monocytes 6.3 %    % Eosinophils 0.8 %    % Basophils 0.5 %    % Immature Granulocytes 0.4 %    Nucleated RBCs 0 0 /100    Absolute Neutrophil 5.5 1.6 - 8.3 10e9/L    Absolute Lymphocytes 1.6 0.8 - 5.3 10e9/L    Absolute Monocytes 0.5 0.0 - 1.3 10e9/L    Absolute Eosinophils 0.1 0.0 - 0.7 10e9/L    Absolute Basophils 0.0 0.0 - 0.2 10e9/L    Abs Immature Granulocytes 0.0 0 - 0.4 10e9/L    Absolute Nucleated RBC 0.0    Comprehensive metabolic panel   Result Value Ref Range    Sodium 141 133 - 144 mmol/L    Potassium 3.5 3.4 - 5.3 mmol/L    Chloride 102 94 - 109 mmol/L    Carbon Dioxide 36 (H) 20 - 32 mmol/L    Anion Gap 3 3 - 14 mmol/L    Glucose 158 (H) 70 - 99 mg/dL    Urea Nitrogen 14 7 - 30 mg/dL    Creatinine 0.89 0.52 - 1.04 mg/dL    GFR Estimate 74 >60 mL/min/[1.73_m2]    GFR Estimate If Black 86 >60  mL/min/[1.73_m2]    Calcium 9.0 8.5 - 10.1 mg/dL    Bilirubin Total 0.5 0.2 - 1.3 mg/dL    Albumin 3.2 (L) 3.4 - 5.0 g/dL    Protein Total 6.4 (L) 6.8 - 8.8 g/dL    Alkaline Phosphatase 123 40 - 150 U/L    ALT 32 0 - 50 U/L    AST 10 0 - 45 U/L   Nt probnp inpatient (BNP)   Result Value Ref Range    N-Terminal Pro BNP Inpatient 58 0 - 900 pg/mL   D dimer quantitative   Result Value Ref Range    D Dimer <0.3 0.0 - 0.50 ug/ml FEU   Blood gas venous   Result Value Ref Range    Ph Venous 7.43 7.32 - 7.43 pH    PCO2 Venous 57 (H) 40 - 50 mm Hg    PO2 Venous 23 (L) 25 - 47 mm Hg    Bicarbonate Venous 38 (H) 21 - 28 mmol/L    Base Excess Venous 11.1 mmol/L    FIO2 2L    XR Chest 2 Views    Narrative    XR CHEST TWO VIEWS   11/8/2019 5:04 PM     HISTORY: Shortness of breath.    COMPARISON: Radiographs from 10/28/2019.    FINDINGS: Enlargement of the cardiac silhouette, unchanged. The  mediastinal silhouette appears normal. The pulmonary vasculature  appears less prominent. Interstitial markings are less prominent. No  airspace disease or effusions.      Impression    IMPRESSION: Enlargement of the cardiac silhouette again noted, but the  pulmonary vasculature and interstitial markings are less prominent,  suggesting resolving pulmonary vascular congestion and edema.    CARMEL ARANGO MD     *Note: Due to a large number of results and/or encounters for the requested time period, some results have not been displayed. A complete set of results can be found in Results Review.       Medications   ipratropium - albuterol 0.5 mg/2.5 mg/3 mL (DUONEB) neb solution 3 mL (3 mLs Nebulization Given 11/8/19 1621)   HYDROcodone-acetaminophen (NORCO) 5-325 MG per tablet 1 tablet (1 tablet Oral Given 11/8/19 1810)   albuterol (PROVENTIL) neb solution 2.5 mg (2.5 mg Nebulization Given 11/8/19 1815)   cefTRIAXone in d5w (ROCEPHIN) intermittent infusion 1 g (0 g Intravenous Stopped 11/8/19 1932)   ketorolac (TORADOL) injection 30 mg (30 mg  Intravenous Given 11/8/19 1932)   predniSONE (DELTASONE) tablet 40 mg (40 mg Oral Given 11/8/19 2004)       Assessments & Plan (with Medical Decision Making)     I have reviewed the nursing notes.    I have reviewed the findings, diagnosis, plan and need for follow up with the patient.       New Prescriptions    No medications on file       Final diagnoses:   COPD exacerbation (H)       11/8/2019   [facility] EMERGENCY DEPARTMENT     Jorge Luis Orozco MD  11/08/19 2008

## 2019-11-08 NOTE — NURSING NOTE
"Chief Complaint   Patient presents with     Leg Swelling       Initial /56 (BP Location: Right arm, Patient Position: Chair, Cuff Size: Adult Regular)   Pulse 80   Temp 97.7  F (36.5  C) (Tympanic)   Resp 28   Wt 108.4 kg (239 lb)   LMP 09/27/2009   SpO2 95%   BMI 51.72 kg/m   Estimated body mass index is 51.72 kg/m  as calculated from the following:    Height as of 10/28/19: 1.448 m (4' 9\").    Weight as of this encounter: 108.4 kg (239 lb).    Patient presents to the clinic using No DME    Health Maintenance that is potentially due pending provider review:  NONE    n/a    Is there anyone who you would like to be able to receive your results? No  If yes have patient fill out LAKSHMI  Denzel Poe M.A.      "

## 2019-11-08 NOTE — ED NOTES
Saw the MD today and was sent here by the MD, MD stated she was in acute heart failure and fluid excess in lungs and legs

## 2019-11-08 NOTE — PROGRESS NOTES
Subjective     Bia Bill is a 53 year old female who presents to clinic today for the following health issues:    HPI   Leg swelling       Duration: has worsened since hospital discharge on 10/31    Description (location/character/radiation): Patient feels leg swelling is not going down and is worsening.  She also states is still having SOB, chest pressure, and coughing. Also having left leg pain.     Intensity:  moderate    Accompanying signs and symptoms: na    History (similar episodes/previous evaluation): was admitted for SOB, Chest pressure 10/29-10/31    Precipitating or alleviating factors: None    Therapies tried and outcome: None     Patient was recently hospitalized for COPD exacerbation and respiratory distress. She has had a 9 pound weight gain since that ED visit 11 days ago. The last few days she reports worsening shortness of breath and chest pressure.     Patient Active Problem List   Diagnosis     Mild persistent asthma     Polyneuropathy in other diseases classified elsewhere (H)     DVT (deep venous thrombosis) (H)     Alcohol abuse, in remission     SECONDARY POLYCYTHEMIA     Chondromalacia of patella     Developmental reading disorder     Esophageal reflux     Hypothyroidism     Fatty liver     GILES (Obstructive Sleep Apnea)-Moderate (AHI 16)     RLS (restless legs syndrome)     Smoker     Erythrocytosis     Moderate mixed bipolar I disorder (H)     Personality disorder, depressive     COPD (chronic obstructive pulmonary disease) (H)     [facility]     DDD (degenerative disc disease), cervical     Benign neoplasm of colon (POLYPOSIS)     History of stroke symptoms, stroke ruled out on imaging     Somatization disorder     Sebaceous cyst of right axilla     HTN, goal below 140/90     Left leg pain     Vitamin D deficiency     Morbid obesity (H)     PVD (peripheral vascular disease) with claudication (H)     Cyst of left ovary     Morbid obesity with alveolar  hypoventilation (H)     Severe episode of recurrent major depressive disorder (H)     Conductive hearing loss of left ear with restricted hearing of right ear     Sensorineural hearing loss (SNHL) of right ear with restricted hearing of left ear     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     Cellulitis     COPD exacerbation (H)     Personal history of DVT (deep vein thrombosis)     Acute respiratory failure with hypoxia and hypercapnia (H)     Lymphedema of both lower extremities     Bronchitis     Past Surgical History:   Procedure Laterality Date     BONE MARROW BIOPSY, BONE SPECIMEN, NEEDLE/TROCAR N/A 11/17/2014    Procedure: BIOPSY BONE MARROW;  Surgeon: Hay Aaron MD;  Location: [facility]     D & C  10/26/09    with uterine ablation     ENDOVASCULAR PLACEMENT VASCULAR DEVICE Left     Left leg stent x 3     ESOPHAGOSCOPY, GASTROSCOPY, DUODENOSCOPY (EGD), COMBINED  4/21/2014    Procedure: Gastroscopy;  Surgeon: Moris Thomas MD;  Location: [facility]     HYSTERECTOMY, PAP NO LONGER INDICATED  1-4-2010     LAPAROSCOPIC CHOLECYSTECTOMY N/A 1/4/2019    Procedure: Laparoscopic cholecystectomy;  Surgeon: Aaron Siegel MD;  Location: [facility]     LITHOTRIPSY  2004    Lithotrypsy       Social History     Tobacco Use     Smoking status: Current Every Day Smoker     Packs/day: 0.50     Years: 34.00     Pack years: 17.00     Types: Cigarettes     Smokeless tobacco: Never Used     Tobacco comment: started at age 11.  Quit during pregnancyX4.  Quit 3 months ago.    Substance Use Topics     Alcohol use: No     Alcohol/week: 0.0 standard drinks     Comment: quit 1995     Family History   Problem Relation Age of Onset     Hypertension Mother      Diabetes Mother      Blood Disease Mother      Heart Disease Mother         chf     Cerebrovascular Disease Mother      Hypertension Father      Cancer Father         lung cancer     Allergies Sister      Diabetes Sister       Depression Sister      Hypertension Sister          Current Outpatient Medications   Medication Sig Dispense Refill     acetaminophen (TYLENOL) 500 MG tablet Take 500-1,000 mg by mouth every 6 hours as needed for mild pain       albuterol (PROAIR HFA/PROVENTIL HFA/VENTOLIN HFA) 108 (90 Base) MCG/ACT inhaler Inhale 2 puffs into the lungs every 6 hours as needed for shortness of breath / dyspnea or wheezing 1 Inhaler 0     albuterol (PROVENTIL) (2.5 MG/3ML) 0.083% neb solution Take 1 vial (2.5 mg) by nebulization every 6 hours as needed for shortness of breath / dyspnea or wheezing 1 Box 0     allopurinol (ZYLOPRIM) 300 MG tablet Take 1 tablet (300 mg) by mouth daily 90 tablet 3     ARIPiprazole (ABILIFY) 10 MG tablet TAKE ONE TABLET BY MOUTH EVERY DAY (Patient taking differently: Take 10 mg by mouth every evening ) 90 tablet 1     buPROPion (WELLBUTRIN XL) 150 MG 24 hr tablet TAKE ONE TABLET BY MOUTH EVERY DAY IN THE EVENING (Patient taking differently: Take 150 mg by mouth every evening TAKE ONE TABLET BY MOUTH EVERY DAY IN THE EVENING) 150 tablet 0     EPINEPHrine (EPIPEN/ADRENACLICK/OR ANY BX GENERIC EQUIV) 0.3 MG/0.3ML injection 2-pack Inject 0.3 mLs (0.3 mg) into the muscle once as needed for anaphylaxis 0.6 mL 0     furosemide (LASIX) 20 MG tablet TAKE TWO TABLETS BY MOUTH TWICE DAILY (Patient taking differently: Take 40 mg by mouth 2 times daily ) 360 tablet 3     gabapentin (NEURONTIN) 300 MG capsule TAKE ONE CAPSULE BY MOUTH ONCE DAILY AT BEDTIME WITH A 600 MG TABLET FOR A TOTAL DOSE  MG (Patient taking differently: Take 300 mg by mouth At Bedtime TAKE ONE CAPSULE BY MOUTH ONCE DAILY AT BEDTIME WITH A 600 MG TABLET FOR A TOTAL DOSE  MG) 90 capsule 0     gabapentin (NEURONTIN) 600 MG tablet TAKE ONE TABLET BY MOUTH THREE TIMES A DAY (Patient taking differently: Take 600 mg by mouth 3 times daily TAKE ONE TABLET BY MOUTH THREE TIMES A DAY) 90 tablet 3     ipratropium - albuterol 0.5 mg/2.5 mg/3 mL  (DUONEB) 0.5-2.5 (3) MG/3ML neb solution Take 1 vial (3 mLs) by nebulization every 6 hours as needed for shortness of breath / dyspnea or wheezing 30 vial 11     lamoTRIgine (LAMICTAL) 100 MG tablet Take 1 tablet (100 mg) by mouth daily 90 tablet 3     levothyroxine (SYNTHROID/LEVOTHROID) 150 MCG tablet Take 1 tablet (150 mcg) by mouth daily 90 tablet 2     mometasone-formoterol (DULERA) 200-5 MCG/ACT oral inhaler Inhale 2 puffs into the lungs 2 times daily 13 g 1     omeprazole (PRILOSEC) 20 MG DR capsule TAKE ONE CAPSULE BY MOUTH TWICE A DAY (Patient taking differently: Take 20 mg by mouth 2 times daily ) 180 capsule 1     order for DME Equipment being ordered: Nebulizer 1 Device 0     order for DME Equipment being ordered: CPAP  AIRSENSE 10  5-18 CM H20  SN# [id number]   DN# [id number]       order for DME Equipment being ordered: DEPENDS SIZE LARGE 60 Month 5     order for DME Equipment being ordered: INCONTINENCE PADS   QID 1 Month 11     potassium chloride ER (K-DUR/KLOR-CON M) 10 MEQ CR tablet TAKE ONE TABLET BY MOUTH ONCE DAILY (Patient taking differently: Take 10 mEq by mouth daily ) 90 tablet 0     pramipexole (MIRAPEX) 0.125 MG tablet TAKE 1-2 TABLETS BY MOUTH AT BEDTIME 60 tablet 10     simvastatin (ZOCOR) 20 MG tablet TAKE ONE TABLET BY MOUTH EVERY NIGHT AT BEDTIME (Patient taking differently: Take 20 mg by mouth At Bedtime ) 90 tablet 3     traZODone (DESYREL) 150 MG tablet Take 1 tablet (150 mg) by mouth At Bedtime 90 tablet 1     VITAMIN D3 1000 units tablet TAKE ONE TABLET BY MOUTH ONCE DAILY (Patient taking differently: Take 1,000 Units by mouth daily ) 100 tablet 2     warfarin ANTICOAGULANT (JANTOVEN ANTICOAGULANT) 5 MG tablet 15 mg (5 mg x 3) every Sun, Tue, Thu; 12.5 mg (5 mg x 2.5) all other days OR AS DIRECTED BY  tablet 0     doxycycline hyclate (VIBRAMYCIN) 100 MG capsule Take 1 capsule (100 mg) by mouth every 12 hours for 6 days 12 capsule 0     order for DME Equipment being ordered:x2  Biacare 30/40mmHg compression wraps with x2 extra prs of compression liners (Patient not taking: Reported on 11/8/2019) 1 each 0     [START ON 11/11/2019] predniSONE (DELTASONE) 20 MG tablet Take 2 tablets (40 mg) by mouth daily for 3 days, THEN 1 tablet (20 mg) daily for 4 days, THEN 0.5 tablets (10 mg) daily for 4 days. 12 tablet 0     Allergies   Allergen Reactions     Darvocet [Propoxyphene N-Apap] Anaphylaxis     Darvocet,Percocet      Percocet [Oxycodone-Acetaminophen] Anaphylaxis, Hives and Swelling     Has tolerated hydromorphone in the past.      Asa [Aspirin] Hives     Aspirin causes seizures and hives, throat swelling.   Has tolerated ketorolac in the past.        Bee      Ibuprofen Swelling     Throat swelling per patient      Lyrica [Pregabalin] Other (See Comments) and Swelling     dizziness     Povidone Iodine Rash     Reaction to topical betadine     Tape [Adhesive Tape] Rash     BP Readings from Last 3 Encounters:   11/10/19 (!) 160/72   11/08/19 124/56   11/06/19 133/60    Wt Readings from Last 3 Encounters:   11/10/19 108.7 kg (239 lb 10.2 oz)   11/08/19 108.4 kg (239 lb)   11/06/19 107.1 kg (236 lb 3.2 oz)                 Reviewed and updated as needed this visit by Provider  Tobacco  Allergies  Meds  Problems  Med Hx  Surg Hx  Fam Hx         Review of Systems   Constitutional: Negative for chills, fever and malaise/fatigue.   HENT: Negative for congestion, ear pain and sore throat.    Eyes: Negative for blurred vision, discharge and redness.   Respiratory: Positive for cough and shortness of breath. Negative for wheezing.    Cardiovascular: Positive for chest pain. Negative for palpitations.   Gastrointestinal: Negative for abdominal pain, diarrhea, nausea and vomiting.   Musculoskeletal: Negative for joint pain and myalgias.        Leg swelling   Skin: Negative for rash.   Neurological: Negative for headaches.           Objective    /56 (BP Location: Right arm, Patient Position:  Chair, Cuff Size: Adult Regular)   Pulse 80   Temp 97.7  F (36.5  C) (Tympanic)   Resp 28   Wt 108.4 kg (239 lb)   LMP 09/27/2009   SpO2 95%   BMI 51.72 kg/m    Body mass index is 51.72 kg/m .    Physical Exam  Constitutional:       General: She is not in acute distress.     Appearance: She is well-developed. She is obese.   HENT:      Head: Normocephalic.      Right Ear: Tympanic membrane and ear canal normal.      Left Ear: Tympanic membrane and ear canal normal.   Eyes:      Conjunctiva/sclera: Conjunctivae normal.      Pupils: Pupils are equal, round, and reactive to light.   Cardiovascular:      Rate and Rhythm: Normal rate.      Heart sounds: Normal heart sounds.   Pulmonary:      Effort: Pulmonary effort is normal.      Comments: Diffuse wheezing and course breath sounds. Patient appears out of breath with prolonged conversation.   Musculoskeletal:      Right lower leg: Edema present.      Left lower leg: Edema (tenderness with palpation of left calft) present.   Skin:     General: Skin is warm and dry.      Findings: No rash.   Psychiatric:         Behavior: Behavior normal.           Diagnostic Test Results:  none         Assessment & Plan     1. SOB (shortness of breath)  Patient having worsening shortness of breath and chest pressure with a 9lb weight gain since her last ED visit. She does not feel prednisone and furosemide given at hospital discharge are working. Patient agrees to be seen in the ED for possible COPD and/or CHF exacerbation and declines labs, EKG, and chest x-ray in clinic. She will go to the ED by private car. Discussed case with the ED and they are expecting her arrival.       2. Chest pressure      3. Pain of left lower leg          Annabelle Bradley PA-C  [facility]

## 2019-11-09 PROBLEM — J40 BRONCHITIS: Status: ACTIVE | Noted: 2019-11-09

## 2019-11-09 LAB
INR PPP: 3.01 (ref 0.86–1.14)
MRSA DNA SPEC QL NAA+PROBE: NEGATIVE
SPECIMEN SOURCE: NORMAL

## 2019-11-09 PROCEDURE — 99220 ZZC INITIAL OBSERVATION CARE,LEVL III: CPT | Performed by: INTERNAL MEDICINE

## 2019-11-09 PROCEDURE — 25000128 H RX IP 250 OP 636: Performed by: INTERNAL MEDICINE

## 2019-11-09 PROCEDURE — 25000132 ZZH RX MED GY IP 250 OP 250 PS 637: Performed by: PHYSICIAN ASSISTANT

## 2019-11-09 PROCEDURE — 25000131 ZZH RX MED GY IP 250 OP 636 PS 637: Performed by: PHYSICIAN ASSISTANT

## 2019-11-09 PROCEDURE — 40000275 ZZH STATISTIC RCP TIME EA 10 MIN

## 2019-11-09 PROCEDURE — 25000132 ZZH RX MED GY IP 250 OP 250 PS 637: Performed by: FAMILY MEDICINE

## 2019-11-09 PROCEDURE — 36415 COLL VENOUS BLD VENIPUNCTURE: CPT | Performed by: INTERNAL MEDICINE

## 2019-11-09 PROCEDURE — 94640 AIRWAY INHALATION TREATMENT: CPT | Mod: 76

## 2019-11-09 PROCEDURE — G0378 HOSPITAL OBSERVATION PER HR: HCPCS

## 2019-11-09 PROCEDURE — 25000132 ZZH RX MED GY IP 250 OP 250 PS 637: Performed by: INTERNAL MEDICINE

## 2019-11-09 PROCEDURE — 85610 PROTHROMBIN TIME: CPT | Performed by: INTERNAL MEDICINE

## 2019-11-09 PROCEDURE — 96376 TX/PRO/DX INJ SAME DRUG ADON: CPT

## 2019-11-09 PROCEDURE — 25000125 ZZHC RX 250: Performed by: PHYSICIAN ASSISTANT

## 2019-11-09 PROCEDURE — 94640 AIRWAY INHALATION TREATMENT: CPT

## 2019-11-09 RX ORDER — KETOROLAC TROMETHAMINE 30 MG/ML
30 INJECTION, SOLUTION INTRAMUSCULAR; INTRAVENOUS EVERY 6 HOURS PRN
Status: DISCONTINUED | OUTPATIENT
Start: 2019-11-09 | End: 2019-11-10 | Stop reason: HOSPADM

## 2019-11-09 RX ORDER — FUROSEMIDE 40 MG
40 TABLET ORAL DAILY
Status: DISCONTINUED | OUTPATIENT
Start: 2019-11-09 | End: 2019-11-10 | Stop reason: HOSPADM

## 2019-11-09 RX ORDER — BENZONATATE 100 MG/1
100 CAPSULE ORAL 3 TIMES DAILY PRN
Status: DISCONTINUED | OUTPATIENT
Start: 2019-11-09 | End: 2019-11-10 | Stop reason: HOSPADM

## 2019-11-09 RX ORDER — DOXYCYCLINE 100 MG/1
100 CAPSULE ORAL EVERY 12 HOURS SCHEDULED
Status: DISCONTINUED | OUTPATIENT
Start: 2019-11-09 | End: 2019-11-10 | Stop reason: HOSPADM

## 2019-11-09 RX ORDER — FUROSEMIDE 40 MG
80 TABLET ORAL DAILY
Status: DISCONTINUED | OUTPATIENT
Start: 2019-11-10 | End: 2019-11-10 | Stop reason: HOSPADM

## 2019-11-09 RX ADMIN — GABAPENTIN 600 MG: 600 TABLET, FILM COATED ORAL at 08:31

## 2019-11-09 RX ADMIN — GABAPENTIN 300 MG: 300 CAPSULE ORAL at 22:12

## 2019-11-09 RX ADMIN — FUROSEMIDE 40 MG: 40 TABLET ORAL at 06:12

## 2019-11-09 RX ADMIN — HYDROCODONE BITARTRATE AND ACETAMINOPHEN 2 TABLET: 5; 325 TABLET ORAL at 22:11

## 2019-11-09 RX ADMIN — GABAPENTIN 600 MG: 600 TABLET, FILM COATED ORAL at 20:12

## 2019-11-09 RX ADMIN — PREDNISONE 40 MG: 20 TABLET ORAL at 11:26

## 2019-11-09 RX ADMIN — PRAMIPEXOLE DIHYDROCHLORIDE 0.25 MG: 0.12 TABLET ORAL at 22:12

## 2019-11-09 RX ADMIN — HYDROCODONE BITARTRATE AND ACETAMINOPHEN 1 TABLET: 5; 325 TABLET ORAL at 14:04

## 2019-11-09 RX ADMIN — GABAPENTIN 600 MG: 600 TABLET, FILM COATED ORAL at 14:48

## 2019-11-09 RX ADMIN — IPRATROPIUM BROMIDE AND ALBUTEROL SULFATE 3 ML: .5; 3 SOLUTION RESPIRATORY (INHALATION) at 08:15

## 2019-11-09 RX ADMIN — IPRATROPIUM BROMIDE AND ALBUTEROL SULFATE 3 ML: .5; 3 SOLUTION RESPIRATORY (INHALATION) at 11:20

## 2019-11-09 RX ADMIN — IPRATROPIUM BROMIDE AND ALBUTEROL SULFATE 3 ML: .5; 3 SOLUTION RESPIRATORY (INHALATION) at 16:16

## 2019-11-09 RX ADMIN — HYDROCODONE BITARTRATE AND ACETAMINOPHEN 1 TABLET: 5; 325 TABLET ORAL at 12:49

## 2019-11-09 RX ADMIN — DOXYCYCLINE 100 MG: 100 CAPSULE ORAL at 20:12

## 2019-11-09 RX ADMIN — KETOROLAC TROMETHAMINE 30 MG: 30 INJECTION, SOLUTION INTRAMUSCULAR at 15:41

## 2019-11-09 RX ADMIN — ALLOPURINOL 300 MG: 300 TABLET ORAL at 08:31

## 2019-11-09 RX ADMIN — OMEPRAZOLE 20 MG: 20 CAPSULE, DELAYED RELEASE ORAL at 08:31

## 2019-11-09 RX ADMIN — IPRATROPIUM BROMIDE AND ALBUTEROL SULFATE 3 ML: .5; 3 SOLUTION RESPIRATORY (INHALATION) at 00:13

## 2019-11-09 RX ADMIN — BUPROPION HYDROCHLORIDE 150 MG: 150 TABLET, FILM COATED, EXTENDED RELEASE ORAL at 17:14

## 2019-11-09 RX ADMIN — ARIPIPRAZOLE 10 MG: 10 TABLET ORAL at 17:13

## 2019-11-09 RX ADMIN — POTASSIUM CHLORIDE 10 MEQ: 750 TABLET, FILM COATED, EXTENDED RELEASE ORAL at 08:31

## 2019-11-09 RX ADMIN — WARFARIN SODIUM 12.5 MG: 7.5 TABLET ORAL at 18:17

## 2019-11-09 RX ADMIN — SIMVASTATIN 20 MG: 20 TABLET, FILM COATED ORAL at 22:12

## 2019-11-09 RX ADMIN — OMEPRAZOLE 20 MG: 20 CAPSULE, DELAYED RELEASE ORAL at 20:13

## 2019-11-09 RX ADMIN — TRAZODONE HYDROCHLORIDE 150 MG: 50 TABLET ORAL at 22:11

## 2019-11-09 RX ADMIN — LAMOTRIGINE 100 MG: 100 TABLET ORAL at 08:31

## 2019-11-09 RX ADMIN — HYDROCODONE BITARTRATE AND ACETAMINOPHEN 1 TABLET: 5; 325 TABLET ORAL at 06:12

## 2019-11-09 RX ADMIN — IPRATROPIUM BROMIDE AND ALBUTEROL SULFATE 3 ML: .5; 3 SOLUTION RESPIRATORY (INHALATION) at 19:35

## 2019-11-09 RX ADMIN — LEVOTHYROXINE SODIUM 150 MCG: 75 TABLET ORAL at 06:12

## 2019-11-09 RX ADMIN — FUROSEMIDE 40 MG: 40 TABLET ORAL at 17:13

## 2019-11-09 ASSESSMENT — ACTIVITIES OF DAILY LIVING (ADL)
ADLS_ACUITY_SCORE: 18
ADLS_ACUITY_SCORE: 18
ADLS_ACUITY_SCORE: 12
ADLS_ACUITY_SCORE: 18

## 2019-11-09 ASSESSMENT — PAIN DESCRIPTION - DESCRIPTORS
DESCRIPTORS: ACHING;SHARP;STABBING
DESCRIPTORS: ACHING
DESCRIPTORS: ACHING;SHARP;STABBING
DESCRIPTORS: ACHING;DISCOMFORT;DULL;NAGGING
DESCRIPTORS: ACHING

## 2019-11-09 ASSESSMENT — MIFFLIN-ST. JEOR: SCORE: 1571.75

## 2019-11-09 NOTE — PROGRESS NOTES
Care Transitions:    Consult received for a elevated risk for readmission.  The patient was hospitalized 10/28-10/31/19 for COPD.    Chart reviewed and plan of care discussed in Interdisciplinary Rounds.  There are no discharge needs identified.  A referral will be placed for Clinic Care Coordination upon discharge.    Care Transitions will continue to monitor through daily chart reviews and Interdisciplinary Rounds.  If immediate needs arise, please contact Care Management at [phone].       Rhiannon Landon RN, Care Coordinator [phone]

## 2019-11-09 NOTE — UTILIZATION REVIEW
"  Admission Status; Secondary Review Determination         Under the authority of the Utilization Management Committee, the utilization review process indicated a secondary review on the above patient.  The review outcome is based on review of the medical records, discussions with staff, and applying clinical experience noted on the date of the review.          (x) Observation Status Appropriate - This patient does not meet hospital inpatient criteria and is placed in observation status. If this patient's primary payer is Medicare and was admitted as an inpatient, Condition Code 44 should be used and patient status changed to \"observation\".     RATIONALE FOR DETERMINATION    53-year-old woman admitted to the hospital when she presented with shortness of breath fever chills, diaphoresis.  She was admitted with COPD exacerbation, she is on oral prednisone and nebulizer, no evidence of infection, pulmonary embolism, or pneumonia.  Currently she is off oxygen discussed with the attending physician expected short stay. The severity of illness, intensity of service provided, expected LOS and risk for adverse outcome make the care appropriate for further observation; however, doesn't meet criteria for hospital inpatient admission. Dr Luz notified of this determination.    This document was produced using voice recognition software.      The information on this document is developed by the utilization review team in order for the business office to ensure compliance.  This only denotes the appropriateness of proper admission status and does not reflect the quality of care rendered.         The definitions of Inpatient Status and Observation Status used in making the determination above are those provided in the CMS Coverage Manual, Chapter 1 and Chapter 6, section 70.4.      Sincerely,     ARACELY CONN MD    System Medical Director  Utilization Management  [facility].      "

## 2019-11-09 NOTE — PHARMACY-ANTICOAGULATION SERVICE
Clinical Pharmacy - Warfarin Dosing Consult     Pharmacy has been consulted to manage this patient s warfarin therapy.  Indication: DVT/ PE Treatment  Therapy Goal: INR 2-3  Provider/Team:    [facility]:    Warfarin Prior to Admission: Yes  Warfarin PTA Regimen:      INR   Date Value Ref Range Status   11/09/2019 3.01 (H) 0.86 - 1.14 Final   11/06/2019 3.67 (H) 0.86 - 1.14 Final     Comment:     Special tube used to correct for high hematocrit       Recommend warfarin none for 11/8 - had PTA.  Pharmacy will monitor Bia Bill daily and order warfarin doses to achieve specified goal.      Please contact pharmacy as soon as possible if the warfarin needs to be held for a procedure or if the warfarin goals change.

## 2019-11-09 NOTE — DISCHARGE INSTRUCTIONS
As a system [facility] wants to ensure that across the care continuum that you have support through care coordination services that include nurses and social workers in the outpatient setting.  Due to your COPD I feel it is important you have this support when you discharge.  They will be calling you within 24-48 hours of your discharge.   A brochure describing the services was provided.  If you have questions you can reach out to your clinic directly and ask for the Care Coordinator assigned to your care.  Rhiannon Landon RN, Care Coordinator [phone]

## 2019-11-09 NOTE — PROGRESS NOTES
Pt medicated with a second Norco tablet for anterior chest pain that radiates into back from constant coughing.

## 2019-11-09 NOTE — H&P
[facility]    History and Physical  Hospital Medicine       Date of Admission:  11/8/2019  Date of Service: 11/9/2019     Assessment & Plan   Bia Bill is a 53 year old female presents on 11/8/2019 with plan of worsening shortness of breath and cough      Bronchitis, chronic    COPD    Suspect still recovering from bronchitis that started prior to last admission. Possible bacterial component. No hypoxemia.    - nebs   - pred 40 mg burst   - doxycycline oral   - Toradol for pain   - tessalon perls for cough      Diastolic heart failure, chronic   Treated last admission. Weight is actually down from last discharge. Still with edema, but does not appear to be an exacerbation of heart failure.    - continue oral furosemide      Polyneuropathy in other diseases classified elsewhere (H)    Moderate mixed bipolar I disorder (H)    Personality disorder, depressive    Morbid obesity with alveolar hypoventilation (H)    - continue home regimen    DVT Prophylaxis: Low Risk/Ambulatory with no VTE prophylaxis indicated  Code Status: Full Code discussed with patient     Lines: PIV   Goldsmith catheter: None    Disposition: Anticipate discharge in 1-2 day(s) once symptoms better managed. Appropriate for observation care    Aaron Luz MD  [facility] Medicine        Primary Care Physician   Kd Leong [phone]    History is obtained from the patient who is a fair historian and review of old records via the EMR.    History of Present Illness    Bia Bill is a 53 year old female who presents with complaint of worsening shortness of breath and cough.  She is recently admitted and discharged after treatment for bronchitis and heart failure.  Clinically she appeared significantly improved.  The patient feels she was only slightly improved.  4 days ago her coughing worsened.  She became much more short of breath.  3 days ago saw her primary care physician who felt there was nothing else to  add to her care.  Since then she feels like her cough is worse and is productive of yellow phlegm.  Continues to feel more short of breath especially with any exertion.  Is also having some central chest pressure and pain that goes straight through to her back and is worse with a deep breath. No fever. C/o increasing edema, shortness of breath with laying flat and increased urination. No dysuria.     Review of Systems   The 10 point Review of Systems is negative other than noted in the HPI or here.  No other pertinent    Past Medical History    Past Medical History:   Diagnosis Date     Acromioclavicular joint arthritis 1/25/2017     Acute bronchospasm 8/15/2016     Acute gouty arthritis 8/4/2014     Anxiety state, unspecified      Bipolar I disorder, most recent episode (or current) unspecified      Closed fracture of metatarsal bone 6/5/2007     Depressive disorder, not elsewhere classified      DVT, lower extremity (H)      Headache 10/17/2014     Impingement syndrome of shoulder region 1/25/2017     Polycythemia, secondary     [place] [facility]      Right shoulder pain 10/14/2014     Trochanteric bursitis of both hips 1/11/2016       Past Surgical History   Past Surgical History:   Procedure Laterality Date     BONE MARROW BIOPSY, BONE SPECIMEN, NEEDLE/TROCAR N/A 11/17/2014    Procedure: BIOPSY BONE MARROW;  Surgeon: Hay Aaron MD;  Location: [facility]     D & C  10/26/09    with uterine ablation     ENDOVASCULAR PLACEMENT VASCULAR DEVICE Left     Left leg stent x 3     ESOPHAGOSCOPY, GASTROSCOPY, DUODENOSCOPY (EGD), COMBINED  4/21/2014    Procedure: Gastroscopy;  Surgeon: Moris Thomas MD;  Location: [facility]     HYSTERECTOMY, PAP NO LONGER INDICATED  1-4-2010     LAPAROSCOPIC CHOLECYSTECTOMY N/A 1/4/2019    Procedure: Laparoscopic cholecystectomy;  Surgeon: Aaron Siegel MD;  Location: [facility]     LITHOTRIPSY  2004    Lithotrypsy        Prior to Admission Medications   Prior to Admission Medications    Prescriptions Last Dose Informant Patient Reported? Taking?   ARIPiprazole (ABILIFY) 10 MG tablet 11/7/2019 at 2130  No Yes   Sig: TAKE ONE TABLET BY MOUTH EVERY DAY   Patient taking differently: Take 10 mg by mouth every evening    EPINEPHrine (EPIPEN/ADRENACLICK/OR ANY BX GENERIC EQUIV) 0.3 MG/0.3ML injection 2-pack Unknown at Unknown time  No No   Sig: Inject 0.3 mLs (0.3 mg) into the muscle once as needed for anaphylaxis   VITAMIN D3 1000 units tablet 11/8/2019 at 0830  No Yes   Sig: TAKE ONE TABLET BY MOUTH ONCE DAILY   Patient taking differently: Take 1,000 Units by mouth daily    acetaminophen (TYLENOL) 500 MG tablet 11/7/2019 at 2030 Self Yes Yes   Sig: Take 500-1,000 mg by mouth every 6 hours as needed for mild pain   albuterol (PROAIR HFA/PROVENTIL HFA/VENTOLIN HFA) 108 (90 Base) MCG/ACT inhaler 11/8/2019 at 1400  No Yes   Sig: Inhale 2 puffs into the lungs every 6 hours as needed for shortness of breath / dyspnea or wheezing   albuterol (PROVENTIL) (2.5 MG/3ML) 0.083% neb solution 11/7/2019 at 1200  No Yes   Sig: Take 1 vial (2.5 mg) by nebulization every 6 hours as needed for shortness of breath / dyspnea or wheezing   allopurinol (ZYLOPRIM) 300 MG tablet 11/8/2019 at 0800 Self No Yes   Sig: Take 1 tablet (300 mg) by mouth daily   buPROPion (WELLBUTRIN XL) 150 MG 24 hr tablet 11/7/2019 at 2130  No Yes   Sig: TAKE ONE TABLET BY MOUTH EVERY DAY IN THE EVENING   Patient taking differently: Take 150 mg by mouth every evening TAKE ONE TABLET BY MOUTH EVERY DAY IN THE EVENING   furosemide (LASIX) 20 MG tablet 11/7/2019 at 1630 Self No Yes   Sig: TAKE TWO TABLETS BY MOUTH TWICE DAILY   Patient taking differently: Take 40 mg by mouth 2 times daily    gabapentin (NEURONTIN) 300 MG capsule 11/7/2019 at 2130 Self No Yes   Sig: TAKE ONE CAPSULE BY MOUTH ONCE DAILY AT BEDTIME WITH A 600 MG TABLET FOR A TOTAL DOSE  MG   Patient taking differently: Take 300 mg by mouth At Bedtime TAKE ONE CAPSULE BY MOUTH ONCE  DAILY AT BEDTIME WITH A 600 MG TABLET FOR A TOTAL DOSE  MG   gabapentin (NEURONTIN) 600 MG tablet 11/7/2019 at 2130  No Yes   Sig: TAKE ONE TABLET BY MOUTH THREE TIMES A DAY   Patient taking differently: Take 600 mg by mouth 3 times daily TAKE ONE TABLET BY MOUTH THREE TIMES A DAY   ipratropium - albuterol 0.5 mg/2.5 mg/3 mL (DUONEB) 0.5-2.5 (3) MG/3ML neb solution 11/8/2019 at 1200  No Yes   Sig: Take 1 vial (3 mLs) by nebulization every 6 hours as needed for shortness of breath / dyspnea or wheezing   lamoTRIgine (LAMICTAL) 100 MG tablet 11/8/2019 at 0830  No Yes   Sig: Take 1 tablet (100 mg) by mouth daily   levothyroxine (SYNTHROID/LEVOTHROID) 150 MCG tablet 11/8/2019 at 0700 Self No Yes   Sig: Take 1 tablet (150 mcg) by mouth daily   mometasone-formoterol (DULERA) 200-5 MCG/ACT oral inhaler 11/7/2019 at 1830 Self No Yes   Sig: Inhale 2 puffs into the lungs 2 times daily   omeprazole (PRILOSEC) 20 MG DR capsule 11/8/2019 at 0830 Self No Yes   Sig: TAKE ONE CAPSULE BY MOUTH TWICE A DAY   Patient taking differently: Take 20 mg by mouth 2 times daily    order for DME 11/8/2019 at Unknown time Self No Yes   Sig: Equipment being ordered: INCONTINENCE PADS   QID   order for DME 11/8/2019 at Unknown time Self No Yes   Sig: Equipment being ordered: DEPENDS SIZE LARGE   order for DME  Self Yes No   Sig: Equipment being ordered: CPAP  AIRSENSE 10  5-18 CM H20  # [id number]   DN# [id number]   order for DME  Self No No   Sig: Equipment being ordered: Nebulizer   order for DME  Self No No   Sig: Equipment being ordered:x2 [employer] 30/40mmHg compression wraps with x2 extra prs of compression liners   Patient not taking: Reported on 11/8/2019   potassium chloride ER (K-DUR/KLOR-CON M) 10 MEQ CR tablet 11/8/2019 at 0830  No Yes   Sig: TAKE ONE TABLET BY MOUTH ONCE DAILY   Patient taking differently: Take 10 mEq by mouth daily    pramipexole (MIRAPEX) 0.125 MG tablet 11/7/2019 at 2130  No Yes   Sig: TAKE 1-2 TABLETS BY MOUTH AT  BEDTIME   predniSONE (DELTASONE) 10 MG tablet 11/8/2019 at 0830Unknown time  No Yes   Sig: Take  3 tabs daily x 3 days, then 2 tabs daily x 3 days then 1 tab daily x 3 days then stop.   Patient taking differently: 5 mg Take  3 tabs daily x 3 days, then 2 tabs daily x 3 days then 1 tab daily x 3 days then stop.   simvastatin (ZOCOR) 20 MG tablet 11/7/2019 at 2130  No Yes   Sig: TAKE ONE TABLET BY MOUTH EVERY NIGHT AT BEDTIME   Patient taking differently: Take 20 mg by mouth At Bedtime    traZODone (DESYREL) 150 MG tablet 11/7/2019 at 2130  No Yes   Sig: Take 1 tablet (150 mg) by mouth At Bedtime   warfarin ANTICOAGULANT (JANTOVEN ANTICOAGULANT) 5 MG tablet 11/8/2019 at 1630  Yes Yes   Sig: 15 mg (5 mg x 3) every Sun, Tue, Thu; 12.5 mg (5 mg x 2.5) all other days OR AS DIRECTED BY [facility]      Facility-Administered Medications: None     Allergies   Allergies   Allergen Reactions     Darvocet [Propoxyphene N-Apap] Anaphylaxis     Darvocet,Percocet      Percocet [Oxycodone-Acetaminophen] Anaphylaxis, Hives and Swelling     Has tolerated hydromorphone in the past.      Asa [Aspirin] Hives     Aspirin causes seizures and hives, throat swelling.   Has tolerated ketorolac in the past.        Bee      Ibuprofen Swelling     Throat swelling per patient      Lyrica [Pregabalin] Other (See Comments) and Swelling     dizziness     Povidone Iodine Rash     Reaction to topical betadine     Tape [Adhesive Tape] Rash       Family History    Family History   Problem Relation Age of Onset     Hypertension Mother      Diabetes Mother      Blood Disease Mother      Heart Disease Mother         chf     Cerebrovascular Disease Mother      Hypertension Father      Cancer Father         lung cancer     Allergies Sister      Diabetes Sister      Depression Sister      Hypertension Sister          Social History   Social History     Socioeconomic History     Marital status:      Spouse name: Not on file     Number of children: Not on file      Years of education: Not on file     Highest education level: Not on file   Occupational History     Not on file   Social Needs     Financial resource strain: Not on file     Food insecurity:     Worry: Not on file     Inability: Not on file     Transportation needs:     Medical: Not on file     Non-medical: Not on file   Tobacco Use     Smoking status: Current Every Day Smoker     Packs/day: 0.50     Years: 34.00     Pack years: 17.00     Types: Cigarettes     Smokeless tobacco: Never Used     Tobacco comment: started at age 11.  Quit during pregnancyX4.  Quit 3 months ago.    Substance and Sexual Activity     Alcohol use: No     Alcohol/week: 0.0 standard drinks     Comment: quit 1995     Drug use: No     Comment: past hx 22 years ago/ uppers downers, canibus     Sexual activity: Not Currently     Birth control/protection: Surgical   Lifestyle     Physical activity:     Days per week: Not on file     Minutes per session: Not on file     Stress: Not on file   Relationships     Social connections:     Talks on phone: Not on file     Gets together: Not on file     Attends Caodaism service: Not on file     Active member of club or organization: Not on file     Attends meetings of clubs or organizations: Not on file     Relationship status: Not on file     Intimate partner violence:     Fear of current or ex partner: Not on file     Emotionally abused: Not on file     Physically abused: Not on file     Forced sexual activity: Not on file   Other Topics Concern      Service No     Blood Transfusions No     Caffeine Concern Yes     Comment: 5 cans, 3 cups a day     Occupational Exposure No     Hobby Hazards No     Sleep Concern No     Stress Concern No     Weight Concern Yes     Special Diet No     Back Care Yes     Comment: Dr. Rl mederos 1 time a month     Exercise Yes     Bike Helmet No     Seat Belt No     Self-Exams No     Parent/sibling w/ CABG, MI or angioplasty before 65F 55M? No   Social  "History Narrative    Lives in [place] with a friend who has been helping since she fell. Not working        Physical Exam   BP (!) 142/57 (BP Location: Left arm, Cuff Size: Adult Regular)   Pulse 69   Temp 97.8  F (36.6  C) (Axillary)   Resp 26   Ht 1.473 m (4' 10\")   Wt 107.2 kg (236 lb 5.3 oz)   LMP 09/27/2009   SpO2 92%   BMI 49.39 kg/m       Weight: 236 lbs 5.33 oz Body mass index is 49.39 kg/m .  Weight is down from last admission.     Constitutional: Alert, oriented, cooperative, mild increased work of breathing, appears nontoxic.  Eyes: Eyes are clear, pupils are reactive.  HEENT: Oropharynx is clear and moist. No evidence of cranial trauma.  Lymph/Hematologic: No epitrochlear, axillary, anterior or posterior cervical, or supraclavicular lymphadenopathy is appreciated.  Cardiovascular: Regular rate and rhythm, normal S1 and S2, soft murmur noted. JVP is difficult to assess due to body habitus. Good peripheral pulses in wrists bilaterally. 2-3+ rle edema and 2+ left lower extremity edema.   Respiratory: Coarse breath sounds bilterally, mild right lower lobe crackles, few rhonchi bilaterally  GI: Soft, non-tender, normal bowel sounds, Obese.  Genitourinary: Deferred  Musculoskeletal: Normal muscle bulk and tone for age.  Skin: Warm and dry, no rashes.   Neurologic: Neck supple. Cranial nerves are grossly intact.  is symmetric.     Data   Data reviewed today:   Recent Labs   Lab 11/09/19  0431 11/08/19  1617 11/06/19  0734   WBC  --  7.7  --    HGB  --  15.3  --    MCV  --  89  --    PLT  --  134*  --    INR 3.01*  --  3.67*   NA  --  141  --    POTASSIUM  --  3.5  --    CHLORIDE  --  102  --    CO2  --  36*  --    BUN  --  14  --    CR  --  0.89  --    ANIONGAP  --  3  --    HOOD  --  9.0  --    GLC  --  158*  --    ALBUMIN  --  3.2*  --    PROTTOTAL  --  6.4*  --    BILITOTAL  --  0.5  --    ALKPHOS  --  123  --    ALT  --  32  --    AST  --  10  --        No results found for this or any " previous visit (from the past 24 hour(s)).    I personally reviewed the chest x-ray image(s) showing improvement in pulmonary vascular congestion and infiltrates compared with last admisssion.    Aaron Luz MD  [facility] Medicine

## 2019-11-09 NOTE — PLAN OF CARE
"Patient states slept well.  \"First night in 2 weeks has slept thru the night.\"  Lungs sounds  are still congested with Rhonchi throughout but especially the upper lobes anteriorly.  Medicated X 2 for pain with her forceful unproductive cough.  Encouraged to deep breathe and cough to loosen congestion to lungs.  Did place hat to toilet to measure urine after Lasix given at 0630.  Plan of care continues.    "

## 2019-11-10 VITALS
SYSTOLIC BLOOD PRESSURE: 160 MMHG | RESPIRATION RATE: 26 BRPM | HEIGHT: 58 IN | TEMPERATURE: 97.6 F | WEIGHT: 239.64 LBS | DIASTOLIC BLOOD PRESSURE: 72 MMHG | OXYGEN SATURATION: 92 % | BODY MASS INDEX: 50.3 KG/M2 | HEART RATE: 78 BPM

## 2019-11-10 LAB
ANION GAP SERPL CALCULATED.3IONS-SCNC: 1 MMOL/L (ref 3–14)
BUN SERPL-MCNC: 18 MG/DL (ref 7–30)
CALCIUM SERPL-MCNC: 9.1 MG/DL (ref 8.5–10.1)
CHLORIDE SERPL-SCNC: 100 MMOL/L (ref 94–109)
CO2 SERPL-SCNC: 39 MMOL/L (ref 20–32)
CREAT SERPL-MCNC: 0.72 MG/DL (ref 0.52–1.04)
GFR SERPL CREATININE-BSD FRML MDRD: >90 ML/MIN/{1.73_M2}
GLUCOSE SERPL-MCNC: 185 MG/DL (ref 70–99)
INR PPP: 2.12 (ref 0.86–1.14)
POTASSIUM SERPL-SCNC: 4.1 MMOL/L (ref 3.4–5.3)
SODIUM SERPL-SCNC: 140 MMOL/L (ref 133–144)

## 2019-11-10 PROCEDURE — 25000132 ZZH RX MED GY IP 250 OP 250 PS 637: Performed by: PHYSICIAN ASSISTANT

## 2019-11-10 PROCEDURE — 99217 ZZC OBSERVATION CARE DISCHARGE: CPT | Performed by: INTERNAL MEDICINE

## 2019-11-10 PROCEDURE — 25000131 ZZH RX MED GY IP 250 OP 636 PS 637: Performed by: PHYSICIAN ASSISTANT

## 2019-11-10 PROCEDURE — G0378 HOSPITAL OBSERVATION PER HR: HCPCS

## 2019-11-10 PROCEDURE — 85610 PROTHROMBIN TIME: CPT | Performed by: INTERNAL MEDICINE

## 2019-11-10 PROCEDURE — 25000132 ZZH RX MED GY IP 250 OP 250 PS 637: Performed by: INTERNAL MEDICINE

## 2019-11-10 PROCEDURE — 36415 COLL VENOUS BLD VENIPUNCTURE: CPT | Performed by: INTERNAL MEDICINE

## 2019-11-10 PROCEDURE — 25000125 ZZHC RX 250: Performed by: PHYSICIAN ASSISTANT

## 2019-11-10 PROCEDURE — 25000132 ZZH RX MED GY IP 250 OP 250 PS 637: Performed by: FAMILY MEDICINE

## 2019-11-10 PROCEDURE — 80048 BASIC METABOLIC PNL TOTAL CA: CPT | Performed by: INTERNAL MEDICINE

## 2019-11-10 RX ORDER — WARFARIN SODIUM 7.5 MG/1
15 TABLET ORAL
Status: DISCONTINUED | OUTPATIENT
Start: 2019-11-10 | End: 2019-11-10 | Stop reason: HOSPADM

## 2019-11-10 RX ORDER — PREDNISONE 20 MG/1
TABLET ORAL
Qty: 12 TABLET | Refills: 0 | Status: ON HOLD | OUTPATIENT
Start: 2019-11-11 | End: 2019-12-12

## 2019-11-10 RX ORDER — DOXYCYCLINE 100 MG/1
100 CAPSULE ORAL EVERY 12 HOURS
Qty: 12 CAPSULE | Refills: 0 | Status: SHIPPED | OUTPATIENT
Start: 2019-11-10 | End: 2019-11-20

## 2019-11-10 RX ADMIN — GABAPENTIN 600 MG: 600 TABLET, FILM COATED ORAL at 07:55

## 2019-11-10 RX ADMIN — LAMOTRIGINE 100 MG: 100 TABLET ORAL at 07:55

## 2019-11-10 RX ADMIN — HYDROCODONE BITARTRATE AND ACETAMINOPHEN 1 TABLET: 5; 325 TABLET ORAL at 06:19

## 2019-11-10 RX ADMIN — LEVOTHYROXINE SODIUM 150 MCG: 75 TABLET ORAL at 06:19

## 2019-11-10 RX ADMIN — PREDNISONE 40 MG: 20 TABLET ORAL at 07:55

## 2019-11-10 RX ADMIN — BENZONATATE 100 MG: 100 CAPSULE ORAL at 11:07

## 2019-11-10 RX ADMIN — ALLOPURINOL 300 MG: 300 TABLET ORAL at 07:54

## 2019-11-10 RX ADMIN — FUROSEMIDE 80 MG: 40 TABLET ORAL at 06:19

## 2019-11-10 RX ADMIN — POTASSIUM CHLORIDE 10 MEQ: 750 TABLET, FILM COATED, EXTENDED RELEASE ORAL at 07:55

## 2019-11-10 RX ADMIN — DOXYCYCLINE 100 MG: 100 CAPSULE ORAL at 07:54

## 2019-11-10 RX ADMIN — HYDROCODONE BITARTRATE AND ACETAMINOPHEN 1 TABLET: 5; 325 TABLET ORAL at 12:05

## 2019-11-10 RX ADMIN — OMEPRAZOLE 20 MG: 20 CAPSULE, DELAYED RELEASE ORAL at 06:19

## 2019-11-10 RX ADMIN — IPRATROPIUM BROMIDE AND ALBUTEROL SULFATE 3 ML: .5; 3 SOLUTION RESPIRATORY (INHALATION) at 11:23

## 2019-11-10 ASSESSMENT — ENCOUNTER SYMPTOMS
BLURRED VISION: 0
HEADACHES: 0
FEVER: 0
DIARRHEA: 0
WHEEZING: 0
SORE THROAT: 0
MYALGIAS: 0
VOMITING: 0
COUGH: 1
EYE DISCHARGE: 0
CHILLS: 0
PALPITATIONS: 0
EYE REDNESS: 0
ABDOMINAL PAIN: 0
SHORTNESS OF BREATH: 1
NAUSEA: 0

## 2019-11-10 ASSESSMENT — PAIN DESCRIPTION - DESCRIPTORS
DESCRIPTORS: ACHING;DISCOMFORT;DULL;NAGGING
DESCRIPTORS: ACHING

## 2019-11-10 ASSESSMENT — MIFFLIN-ST. JEOR: SCORE: 1586.75

## 2019-11-10 NOTE — PROGRESS NOTES
Did drop saturations once into good REM sleep, still does not have CPAP for GILES so when drop to 87 % placed back on oxymask 3 LPM for the night.  Continue to monitor.

## 2019-11-10 NOTE — PLAN OF CARE
Patient states slept well through the night, cough is improving and so cough is better today also.  Lung sounds less crackles noted, still some rhonchi with no wheezing overnight.  Did request pain pill to start day, also received Lasix po at 0620 to start day also.  Wanted to doze more so did change back to NC and will wean off for morning.  Plan of care continues.

## 2019-11-10 NOTE — PLAN OF CARE
"Patient has c/o \"rib cage,chest wall pain from coughing\", medicated with norco and using warm blankets across chest area.  Patient did say the the \"IV med helped', patient was given IV toradol.  Patient has been up walking around ing the room with stand by assist, gait steady.   "

## 2019-11-10 NOTE — DISCHARGE SUMMARY
[facility]  Discharge Summary  Hospital Medicine       Date of Admission:  11/8/2019  Date of Discharge:  11/10/2019  2:59 PM  Discharging Provider: Aaron Luz MD      Identification and Chief Compaint: Bia Bill is a 53 year old female who presented on 11/8/2019 with complaint of worsening shortness of breath and cough.    Discharge Diagnoses     Bronchitis, subacute on chronic     COPD    Diastolic heart failure, chronic     Follow-ups Needed After Discharge   Follow-up Appointments     Follow-up and recommended labs and tests       Follow up with primary care provider, Kd Leong, within 7 days for   hospital follow- up.  No follow up labs or test are needed.             Hospital Course   Bia Bill is a 53 year old female presents on 11/8/2019 with plan of worsening shortness of breath and cough      Bronchitis, chronic    COPD    Suspect still recovering from bronchitis that started prior to last admission. Possible bacterial component. No hypoxemia. Started on prednisone burst, doxycycline and continued on nebs. Tessalon Perls was not helpful for cough. Given one dose of Toradol for chest discomfort, which the patient feels helped. Symptoms are improved though still has some cough and dyspnea on exertion above baseline. She appears stable for discharge home. She will complete a prednisone course with taper as well as a course of doxycycline.       Diastolic heart failure, chronic   Treated last admission. Weight is actually down from last discharge. Still with edema, but does not appear to be an exacerbation of heart failure. Continue oral furosemide.      Polyneuropathy in other diseases classified elsewhere (H)    Moderate mixed bipolar I disorder (H)    Personality disorder, depressive    Morbid obesity with alveolar hypoventilation (H)    - continue home regimen      Consultations This Hospital Stay   PHARMACY TO DOSE WARFARIN  CARE TRANSITION RN/SW  IP CONSULT    Code Status   Full Code    The discharge plan was discussed with the patient, and she expressed understanding.     Time Spent on this Encounter   Total time on this discharge was 25 minutes.       Aaron Luz MD  [facility]  ______________________________________________________________________    Physical Exam   Vital Signs: Temp: 97.6  F (36.4  C) Temp src: Oral BP: (!) 160/72 Pulse: 78   Resp: 26 SpO2: 92 % O2 Device: None (Room air) Oxygen Delivery: 2 LPM  Weight: 239 lbs 10.24 oz  Constitutional: alert and oriented, NAD, dressed and smiling  CV: Regular, 2+ bilateral lower extremity edema   Respiratory: Coarse breath sounds, occasional rhonchi otherwise CTA bilaterally  GI: Soft, nontender  Skin: Warm and dry       Primary Care Physician   Kd Leong  [address] [facility] [id number]     Discharge Disposition   Discharged to home  Condition at discharge: Stable    Significant Results and Procedures   Results for orders placed or performed during the hospital encounter of 11/08/19   XR Chest 2 Views    Narrative    XR CHEST TWO VIEWS   11/8/2019 5:04 PM     HISTORY: Shortness of breath.    COMPARISON: Radiographs from 10/28/2019.    FINDINGS: Enlargement of the cardiac silhouette, unchanged. The  mediastinal silhouette appears normal. The pulmonary vasculature  appears less prominent. Interstitial markings are less prominent. No  airspace disease or effusions.      Impression    IMPRESSION: Enlargement of the cardiac silhouette again noted, but the  pulmonary vasculature and interstitial markings are less prominent,  suggesting resolving pulmonary vascular congestion and edema.    CARMEL ARANGO MD     *Note: Due to a large number of results and/or encounters for the requested time period, some results have not been displayed. A complete set of results can be found in Results Review.     Procedures    None    Discharge Orders      Reason for your  hospital stay    Bronchitis     Follow-up and recommended labs and tests     Follow up with primary care provider, Kd Leong, within 7 days for hospital follow- up.  No follow up labs or test are needed.     Activity    Your activity upon discharge: activity as tolerated     Diet    Follow this diet upon discharge: Orders Placed This Encounter      Regular Diet Adult     Discharge Medications   Current Discharge Medication List      START taking these medications    Details   doxycycline hyclate (VIBRAMYCIN) 100 MG capsule Take 1 capsule (100 mg) by mouth every 12 hours for 6 days  Qty: 12 capsule, Refills: 0    Associated Diagnoses: Bronchitis      predniSONE (DELTASONE) 20 MG tablet Take 2 tablets (40 mg) by mouth daily for 3 days, THEN 1 tablet (20 mg) daily for 4 days, THEN 0.5 tablets (10 mg) daily for 4 days.  Qty: 12 tablet, Refills: 0    Associated Diagnoses: Bronchitis         CONTINUE these medications which have NOT CHANGED    Details   acetaminophen (TYLENOL) 500 MG tablet Take 500-1,000 mg by mouth every 6 hours as needed for mild pain      albuterol (PROAIR HFA/PROVENTIL HFA/VENTOLIN HFA) 108 (90 Base) MCG/ACT inhaler Inhale 2 puffs into the lungs every 6 hours as needed for shortness of breath / dyspnea or wheezing  Qty: 1 Inhaler, Refills: 0    Comments: Pharmacy may dispense brand covered by insurance (Proair, or proventil or ventolin or generic albuterol inhaler)  Associated Diagnoses: Mild persistent asthma without complication; Chronic obstructive pulmonary disease, unspecified COPD type (H)      albuterol (PROVENTIL) (2.5 MG/3ML) 0.083% neb solution Take 1 vial (2.5 mg) by nebulization every 6 hours as needed for shortness of breath / dyspnea or wheezing  Qty: 1 Box, Refills: 0    Comments: The patient requests that this prescription be held on file for filling in the near future.  Associated Diagnoses: Mild persistent asthma without complication; Chronic obstructive pulmonary disease,  unspecified COPD type (H)      allopurinol (ZYLOPRIM) 300 MG tablet Take 1 tablet (300 mg) by mouth daily  Qty: 90 tablet, Refills: 3    Associated Diagnoses: Acute gouty arthritis      ARIPiprazole (ABILIFY) 10 MG tablet TAKE ONE TABLET BY MOUTH EVERY DAY  Qty: 90 tablet, Refills: 1    Associated Diagnoses: Major depressive disorder, recurrent episode, moderate (H)      buPROPion (WELLBUTRIN XL) 150 MG 24 hr tablet TAKE ONE TABLET BY MOUTH EVERY DAY IN THE EVENING  Qty: 150 tablet, Refills: 0    Associated Diagnoses: Moderate mixed bipolar I disorder (H)      furosemide (LASIX) 20 MG tablet TAKE TWO TABLETS BY MOUTH TWICE DAILY  Qty: 360 tablet, Refills: 3    Associated Diagnoses: Lymphedema of both lower extremities      gabapentin (NEURONTIN) 300 MG capsule TAKE ONE CAPSULE BY MOUTH ONCE DAILY AT BEDTIME WITH A 600 MG TABLET FOR A TOTAL DOSE  MG  Qty: 90 capsule, Refills: 0    Associated Diagnoses: Polyneuropathy in other diseases classified elsewhere (H)      gabapentin (NEURONTIN) 600 MG tablet TAKE ONE TABLET BY MOUTH THREE TIMES A DAY  Qty: 90 tablet, Refills: 3    Associated Diagnoses: Polyneuropathy in other diseases classified elsewhere (H)      ipratropium - albuterol 0.5 mg/2.5 mg/3 mL (DUONEB) 0.5-2.5 (3) MG/3ML neb solution Take 1 vial (3 mLs) by nebulization every 6 hours as needed for shortness of breath / dyspnea or wheezing  Qty: 30 vial, Refills: 11    Associated Diagnoses: Chronic obstructive pulmonary disease, unspecified COPD type (H)      lamoTRIgine (LAMICTAL) 100 MG tablet Take 1 tablet (100 mg) by mouth daily  Qty: 90 tablet, Refills: 3    Associated Diagnoses: Major depressive disorder, recurrent episode, moderate (H)      levothyroxine (SYNTHROID/LEVOTHROID) 150 MCG tablet Take 1 tablet (150 mcg) by mouth daily  Qty: 90 tablet, Refills: 2    Associated Diagnoses: Hypothyroidism, unspecified type      mometasone-formoterol (DULERA) 200-5 MCG/ACT oral inhaler Inhale 2 puffs into the  lungs 2 times daily  Qty: 13 g, Refills: 1    Associated Diagnoses: COPD exacerbation (H); Chronic obstructive pulmonary disease, unspecified COPD type (H)      omeprazole (PRILOSEC) 20 MG DR capsule TAKE ONE CAPSULE BY MOUTH TWICE A DAY  Qty: 180 capsule, Refills: 1    Associated Diagnoses: Gastroesophageal reflux disease without esophagitis      !! order for DME Equipment being ordered: DEPENDS SIZE LARGE  Qty: 60 Month, Refills: 5    Associated Diagnoses: Mixed incontinence      !! order for DME Equipment being ordered: INCONTINENCE PADS   QID  Qty: 1 Month, Refills: 11    Associated Diagnoses: Other urinary incontinence      potassium chloride ER (K-DUR/KLOR-CON M) 10 MEQ CR tablet TAKE ONE TABLET BY MOUTH ONCE DAILY  Qty: 90 tablet, Refills: 0    Associated Diagnoses: Lymphedema      pramipexole (MIRAPEX) 0.125 MG tablet TAKE 1-2 TABLETS BY MOUTH AT BEDTIME  Qty: 60 tablet, Refills: 10    Associated Diagnoses: Restless leg syndrome      simvastatin (ZOCOR) 20 MG tablet TAKE ONE TABLET BY MOUTH EVERY NIGHT AT BEDTIME  Qty: 90 tablet, Refills: 3    Associated Diagnoses: Hypercholesteremia      traZODone (DESYREL) 150 MG tablet Take 1 tablet (150 mg) by mouth At Bedtime  Qty: 90 tablet, Refills: 1    Associated Diagnoses: Major depressive disorder, recurrent episode, moderate (H)      VITAMIN D3 1000 units tablet TAKE ONE TABLET BY MOUTH ONCE DAILY  Qty: 100 tablet, Refills: 2    Associated Diagnoses: Lymphedema of both lower extremities      warfarin ANTICOAGULANT (JANTOVEN ANTICOAGULANT) 5 MG tablet 15 mg (5 mg x 3) every Sun, Tue, Thu; 12.5 mg (5 mg x 2.5) all other days OR AS DIRECTED BY ACC  Qty: 180 tablet, Refills: 0    Associated Diagnoses: DVT (deep venous thrombosis) (H)      EPINEPHrine (EPIPEN/ADRENACLICK/OR ANY BX GENERIC EQUIV) 0.3 MG/0.3ML injection 2-pack Inject 0.3 mLs (0.3 mg) into the muscle once as needed for anaphylaxis  Qty: 0.6 mL, Refills: 0    Associated Diagnoses: Anaphylactic reaction to  bee sting, undetermined intent, subsequent encounter      !! order for DME Equipment being ordered:x2 [employer] 30/40mmHg compression wraps with x2 extra prs of compression liners  Qty: 1 each, Refills: 0    Associated Diagnoses: Secondary lymphedema      !! order for DME Equipment being ordered: Nebulizer  Qty: 1 Device, Refills: 0    Associated Diagnoses: COPD exacerbation (H); Chronic obstructive pulmonary disease, unspecified COPD type (H)      !! order for DME Equipment being ordered: CPAP  AIRSENSE 10  5-18 CM H20  SN# [id number]   DN# [id number]       !! - Potential duplicate medications found. Please discuss with provider.        Allergies   Allergies   Allergen Reactions     Darvocet [Propoxyphene N-Apap] Anaphylaxis     Darvocet,Percocet      Percocet [Oxycodone-Acetaminophen] Anaphylaxis, Hives and Swelling     Has tolerated hydromorphone in the past.      Asa [Aspirin] Hives     Aspirin causes seizures and hives, throat swelling.   Has tolerated ketorolac in the past.        Bee      Ibuprofen Swelling     Throat swelling per patient      Lyrica [Pregabalin] Other (See Comments) and Swelling     dizziness     Povidone Iodine Rash     Reaction to topical betadine     Tape [Adhesive Tape] Rash

## 2019-11-10 NOTE — PROGRESS NOTES
[place] [facility] DISCHARGE NOTE    Patient discharged to home at 1459 PM via wheel chair. Accompanied by father and staff. Discharge instructions reviewed with patient, opportunity offered to ask questions. Prescriptions sent to patients preferred pharmacy. All belongings sent with patient.  1459-Discharge instructions reviewed with pt and pt stated verbal understanding of discharge instructions. Copy of discharge instructions given to pt and pt instructed to bring copy to next clinic appointment. New medications reviewed with pt and patients questions answered about new medications. Pt given side effects paper for home reference, and medication side effects reviewed over with pt. Pt discharged at 1459, via wheelchair with all belongings. Deepak Dhillon RN,BC, CCRN

## 2019-11-11 ENCOUNTER — PATIENT OUTREACH (OUTPATIENT)
Dept: CARE COORDINATION | Facility: CLINIC | Age: 54
End: 2019-11-11

## 2019-11-11 DIAGNOSIS — Z71.89 OTHER SPECIFIED COUNSELING: ICD-10-CM

## 2019-11-11 NOTE — PROGRESS NOTES
Clinic Care Coordination Contact    Clinic Care Coordination Contact  OUTREACH    Referral Information: Patient at [facility] from 11/8/19 to 11/10/19 with COPD exacerbation    Primary Diagnosis: COPD    Chief Complaint   Patient presents with     Clinic Care Coordination - Post Hospital     RN   [place] Utilization: Two admissions in past three weeks  Clinic Utilization  Difficulty keeping appointments:: No  Compliance Concerns: No  No-Show Concerns: No  No PCP office visit in Past Year: No  Utilization    Last refreshed: 11/11/2019  8:02 AM:  Hospital Admissions 3           Last refreshed: 11/11/2019  8:02 AM:  ED Visits 1           Last refreshed: 11/11/2019  8:02 AM:  No Show Count (past year) 2              Current as of: 11/11/2019  8:02 AM          Clinical Concerns:  Current Medical Concerns:  Patient states they tell her that her lungs are clearing but she still feels very short of breath.    She states that her chest is very sore from coughing and she is just worn out.    Patient states she left a message with pulmonology to set up an appointment.     Patient is on prednisone taper and antibiotic.       Current Behavioral Concerns: Denies concerns       Education Provided to patient: Use tylenol and heat to chest for discomfort.      Pain  Pain (GOAL):: No    Health Maintenance Reviewed:    Clinical Pathway: Clinic Care Coordination COPD Assessment    Discharge:    Day of hospital discharge: 11/10/19  What recommendations were made for follow up after your recent hospitalization? PCP follow up    Have the follow up appointments been scheduled? Yes  If not, can I help you set up these appointments? N/A  Transportation concerns (GOAL):: No  Is there a referral for Pulmonary Rehab? No    Symptom Review:    How have you been feeling now that you are home? About the same  Are you having any increased shortness of breath? No - but not much better either  Symptoms  Anxiety: No  Chest pain: : No  Chills:  No  Cough: Yes  Is your cough:: Productive, Worse when you lie down  Fever: No  Fatigue: Yes  Increased sputum: Yes  Sputum Color: Yellow  Sputum Consistency: Thick  Frequency: Improving,   Night sweats: No  Weight change: : No  Wheezing: Yes  COPD symptoms limiting activities?: Yes  What COPD zone are you currently in?: Yellow  Taking COPD medications as prescribed: : Yes  Took steroids (by mouth) for COPD: Yes  Overall your COPD symptoms are (GOAL):: Stable    Do you have a COPD Action Plan? Yes    Is the COPD Action Plan on refrigerator or available: Yes   reviewed  What number would you call if you were in the YELLOW zones:  [phone]    Medications:    Were you started on any new medications?  Yes, prednisone and doxycycline  Were any of your previous medications changed? No  Do you have all of your medications? Yes,   Have you had trouble filling your prescriptions? No  Are you medications effective in controlling your symptoms? Most of the time  Are you currently on Prednisone (* does pt understand the tapering instructions)?  Yes     Medication reconciliation completed? Yes  Was MTM or Diabetic Education referral placed (*Make sure to put transitions as reason for referral)? No    Oxygen/DME:    Are you currently on oxygen?  No   Review with patient how to use/maintain nebulizers and inhalers: Yes. Nebulizer is working now.     Activity:    How much activity can you do before you are SOB? Not very much  Have you had to reduce your activities because of dyspnea or other symptoms? No     Diet:    Do you weigh yourself daily? No    Are there any current diet restrictions or changes per discharge instructions? No    Emotions/Lifestyle:    Do you smoke?  reports that she has been smoking cigarettes. She has a 17.00 pack-year smoking history. She has never used smokeless tobacco.  Would you like to try to quit smoking? Patient states she has quit smoking    Medication Management:  Independent in medication  administration.     Functional Status:   Independent  Living Situation:  Lives alone- has a significant other to assist    Diet/Exercise/Sleep:   Diet:: No added salt  Inadequate nutrition (GOAL):: No  Food Insecurity: No  Tube Feeding: No  Exercise:: Currently not exercising  Inadequate activity/exercise (GOAL):: No  Significant changes in sleep pattern (GOAL): No    Transportation:  Transportation concerns (GOAL):: No  Transportation means:: Accessible car     Psychosocial:   Family support     Financial/Insurance:   Financial/Insurance concerns (GOAL):: No     Resources and Interventions:  Current Resources:    Goals        General    Medical (pt-stated)     Notes - Note created  11/1/2019 12:49 PM by Michela Whitaker RN    Goal Statement: I want my shortness of breath to improve    Date goal set: 11/1/19    Measure of Success: When I am not short of breath    Barriers: COPD    Strengths: Motivated    Date to Achieve By: February 2020    Patient expressed understanding of goal: Yes    Action steps to achieve this goal  1. I will get my nebulizer fixed  2. I will use nebulizer as instructed  3. I will take prednisone as prescribed          Patient/Caregiver understanding: Expresses understanding. Patient is frustrated. Thinks she she be getting better faster. She states if she does not feel better in a few days she is planning to present to [facility] ED.    Outreach Frequency: monthly  Future Appointments              In 1 week [facility] SAIGE    In 1 week Kd Morris [facility]    In 1 week Kd Leong MD [facility]    In 3 weeks Kd Morris [facility]    In 4 weeks Patel Bazzi MD [facility]    In 1 month [facility] SAIGE    In 1 month Joshua Watts MD [facility]       Plan: Patient will take prednisone and antibiotic as ordered. Patient will continue to use inhalers and nebulizer as ordered.  Patient will not smoke.     Clinic care coordinator will continue to follow and outreach in 2-4 weeks.    Michela Whitaker RN, [facility] RN Coordinator  [facility]   11/11/2019    11:03 AM  [phone]

## 2019-11-14 ENCOUNTER — TELEPHONE (OUTPATIENT)
Dept: PHARMACY | Facility: CLINIC | Age: 54
End: 2019-11-14

## 2019-11-14 NOTE — TELEPHONE ENCOUNTER
This patient is due for MTM follow-up. I called the patient to schedule an appointment and left a message with the clinic phone number for the patient to call to schedule.    Eduardo Marquez, MaldonadoD, [facility]  Medication Therapy Management Pharmacist  Pager: [phone]

## 2019-11-15 ENCOUNTER — TRANSFERRED RECORDS (OUTPATIENT)
Dept: HEALTH INFORMATION MANAGEMENT | Facility: CLINIC | Age: 54
End: 2019-11-15

## 2019-11-15 LAB — RETINOPATHY: NORMAL

## 2019-11-18 ENCOUNTER — ANTICOAGULATION THERAPY VISIT (OUTPATIENT)
Dept: ANTICOAGULATION | Facility: CLINIC | Age: 54
End: 2019-11-18
Payer: COMMERCIAL

## 2019-11-18 ENCOUNTER — OFFICE VISIT (OUTPATIENT)
Dept: PSYCHOLOGY | Facility: CLINIC | Age: 54
End: 2019-11-18
Payer: COMMERCIAL

## 2019-11-18 ENCOUNTER — HOSPITAL ENCOUNTER (OUTPATIENT)
Dept: LAB | Facility: CLINIC | Age: 54
Discharge: HOME | End: 2019-11-18
Attending: INTERNAL MEDICINE | Admitting: FAMILY MEDICINE
Payer: COMMERCIAL

## 2019-11-18 DIAGNOSIS — I82.409 DVT (DEEP VENOUS THROMBOSIS) (H): ICD-10-CM

## 2019-11-18 DIAGNOSIS — I73.9 PVD (PERIPHERAL VASCULAR DISEASE) WITH CLAUDICATION (H): ICD-10-CM

## 2019-11-18 DIAGNOSIS — Z86.73 HISTORY OF STROKE: ICD-10-CM

## 2019-11-18 DIAGNOSIS — F31.62 MODERATE MIXED BIPOLAR I DISORDER (H): Primary | ICD-10-CM

## 2019-11-18 LAB
BASOPHILS # BLD AUTO: 0 10E9/L (ref 0–0.2)
BASOPHILS NFR BLD AUTO: 0.3 %
DIFFERENTIAL METHOD BLD: ABNORMAL
EOSINOPHIL # BLD AUTO: 0 10E9/L (ref 0–0.7)
EOSINOPHIL NFR BLD AUTO: 0.4 %
ERYTHROCYTE [DISTWIDTH] IN BLOOD BY AUTOMATED COUNT: 20 % (ref 10–15)
HCT VFR BLD AUTO: 53.8 % (ref 35–47)
HGB BLD-MCNC: 15.3 G/DL (ref 11.7–15.7)
IMM GRANULOCYTES # BLD: 0 10E9/L (ref 0–0.4)
IMM GRANULOCYTES NFR BLD: 0.6 %
INR PPP: 2.87 (ref 0.86–1.14)
LYMPHOCYTES # BLD AUTO: 0.8 10E9/L (ref 0.8–5.3)
LYMPHOCYTES NFR BLD AUTO: 12.1 %
MCH RBC QN AUTO: 25.2 PG (ref 26.5–33)
MCHC RBC AUTO-ENTMCNC: 28.4 G/DL (ref 31.5–36.5)
MCV RBC AUTO: 89 FL (ref 78–100)
MONOCYTES # BLD AUTO: 0.2 10E9/L (ref 0–1.3)
MONOCYTES NFR BLD AUTO: 2.7 %
NEUTROPHILS # BLD AUTO: 5.8 10E9/L (ref 1.6–8.3)
NEUTROPHILS NFR BLD AUTO: 83.9 %
NRBC # BLD AUTO: 0 10*3/UL
NRBC BLD AUTO-RTO: 0 /100
PLATELET # BLD AUTO: 131 10E9/L (ref 150–450)
RBC # BLD AUTO: 6.08 10E12/L (ref 3.8–5.2)
WBC # BLD AUTO: 6.9 10E9/L (ref 4–11)

## 2019-11-18 PROCEDURE — 85610 PROTHROMBIN TIME: CPT | Performed by: FAMILY MEDICINE

## 2019-11-18 PROCEDURE — 85025 COMPLETE CBC W/AUTO DIFF WBC: CPT | Performed by: FAMILY MEDICINE

## 2019-11-18 PROCEDURE — 99207 ZZC NO CHARGE NURSE ONLY: CPT

## 2019-11-18 PROCEDURE — 36415 COLL VENOUS BLD VENIPUNCTURE: CPT | Performed by: FAMILY MEDICINE

## 2019-11-18 PROCEDURE — 90834 PSYTX W PT 45 MINUTES: CPT | Performed by: PSYCHOLOGIST

## 2019-11-18 NOTE — PROGRESS NOTES
ANTICOAGULATION FOLLOW-UP CLINIC VISIT    Patient Name:  Bia Bill  Date:  2019  Contact Type:  Telephone    SUBJECTIVE:  Patient Findings     Positives:   Change in medications (finished course of doxycycline, continues on prednisone ), Hospital admission (bronchitis/COPD )    Comments:   Patient was hospitalized for bronchitis/COPD. Discharged home on prednisone taper and 6 days of doxycyline (she has already finished this), continues on prednisone for an additional 4 days. Patient did take her warfarin as directed, no missed or extra doses. Patient's maintenance dose was recently adjusted, will plan to continue her current maintenance dose and recheck INR in one week. No concerns of increased bruising (patient states she does have an old bruise from an IV start during her hospital stay) or bleeding reported.         Clinical Outcomes     Comments:   Patient was hospitalized for bronchitis/COPD. Discharged home on prednisone taper and 6 days of doxycyline (she has already finished this), continues on prednisone for an additional 4 days. Patient did take her warfarin as directed, no missed or extra doses. Patient's maintenance dose was recently adjusted, will plan to continue her current maintenance dose and recheck INR in one week. No concerns of increased bruising (patient states she does have an old bruise from an IV start during her hospital stay) or bleeding reported.            OBJECTIVE    INR   Date Value Ref Range Status   2019 2.87 (H) 0.86 - 1.14 Final       ASSESSMENT / PLAN  INR assessment THER    Recheck INR In: 1 WEEK    INR Location Outside lab      Anticoagulation Summary  As of 2019    INR goal:   2.0-3.0   TTR:   46.6 % (4.5 y)   INR used for dosin.87 (2019)   Warfarin maintenance plan:   15 mg (5 mg x 3) every Sun, Tue, Thu; 12.5 mg (5 mg x 2.5) all other days   Full warfarin instructions:   15 mg every Sun, Tue, Thu; 12.5 mg all other days   Weekly warfarin  total:   95 mg   No change documented:   Janine Velasquez RN   Plan last modified:   Reina Forrest RN (11/6/2019)   Next INR check:   11/26/2019   Priority:   Maintenance   Target end date:   Indefinite    Indications    PVD (peripheral vascular disease) with claudication (H) [I73.9]  Long term current use of anticoagulant therapy (Resolved) [Z79.01]  DVT (deep venous thrombosis) (H) [I82.409]  History of stroke symptoms  stroke ruled out on imaging [Z86.73]             Anticoagulation Episode Summary     INR check location:       Preferred lab:       Send INR reminders to:   [place] PHONE ANTICOAG POOL    Comments:   * lab draw due to elevated hematocrit (fingerstick meters don't work). LEFT LE. STENT x 3 LEFT UPPER LEG. Previous arterial clot. AS of 10/9/19 Goal range 2.0-3.0.      Anticoagulation Care Providers     Provider Role Specialty Phone number    Kd Leong MD [facility] [phone]            See the Encounter Report to view Anticoagulation Flowsheet and Dosing Calendar (Go to Encounters tab in chart review, and find the Anticoagulation Therapy Visit)      Janine Velasquez, RN

## 2019-11-20 ENCOUNTER — OFFICE VISIT (OUTPATIENT)
Dept: FAMILY MEDICINE | Facility: CLINIC | Age: 54
End: 2019-11-20
Payer: COMMERCIAL

## 2019-11-20 VITALS
RESPIRATION RATE: 20 BRPM | DIASTOLIC BLOOD PRESSURE: 62 MMHG | HEART RATE: 83 BPM | TEMPERATURE: 98.7 F | BODY MASS INDEX: 50.56 KG/M2 | OXYGEN SATURATION: 97 % | SYSTOLIC BLOOD PRESSURE: 139 MMHG | WEIGHT: 241.9 LBS

## 2019-11-20 DIAGNOSIS — I89.0 LYMPH EDEMA: Primary | ICD-10-CM

## 2019-11-20 DIAGNOSIS — J44.9 CHRONIC OBSTRUCTIVE PULMONARY DISEASE, UNSPECIFIED COPD TYPE (H): Chronic | ICD-10-CM

## 2019-11-20 DIAGNOSIS — J45.30 MILD PERSISTENT ASTHMA WITHOUT COMPLICATION: ICD-10-CM

## 2019-11-20 PROCEDURE — 99214 OFFICE O/P EST MOD 30 MIN: CPT | Performed by: FAMILY MEDICINE

## 2019-11-20 RX ORDER — FLUTICASONE PROPIONATE AND SALMETEROL XINAFOATE 230; 21 UG/1; UG/1
2 AEROSOL, METERED RESPIRATORY (INHALATION) 2 TIMES DAILY
Qty: 1 INHALER | Refills: 3 | Status: SHIPPED | OUTPATIENT
Start: 2019-11-20 | End: 2020-02-26

## 2019-11-20 RX ORDER — ALBUTEROL SULFATE 90 UG/1
2 AEROSOL, METERED RESPIRATORY (INHALATION) EVERY 6 HOURS PRN
Qty: 1 INHALER | Refills: 11 | Status: SHIPPED | OUTPATIENT
Start: 2019-11-20 | End: 2020-06-01

## 2019-11-20 ASSESSMENT — ASTHMA QUESTIONNAIRES
ACT_TOTALSCORE: 15
QUESTION_4 LAST FOUR WEEKS HOW OFTEN HAVE YOU USED YOUR RESCUE INHALER OR NEBULIZER MEDICATION (SUCH AS ALBUTEROL): TWO OR THREE TIMES PER WEEK
QUESTION_5 LAST FOUR WEEKS HOW WOULD YOU RATE YOUR ASTHMA CONTROL: SOMEWHAT CONTROLLED
QUESTION_2 LAST FOUR WEEKS HOW OFTEN HAVE YOU HAD SHORTNESS OF BREATH: THREE TO SIX TIMES A WEEK
QUESTION_3 LAST FOUR WEEKS HOW OFTEN DID YOUR ASTHMA SYMPTOMS (WHEEZING, COUGHING, SHORTNESS OF BREATH, CHEST TIGHTNESS OR PAIN) WAKE YOU UP AT NIGHT OR EARLIER THAN USUAL IN THE MORNING: ONCE A WEEK
HOSPITALIZATION_OVERNIGHT_LAST_YEAR_TOTAL: ONE
EMERGENCY_ROOM_LAST_YEAR_TOTAL: ONE
QUESTION_1 LAST FOUR WEEKS HOW MUCH OF THE TIME DID YOUR ASTHMA KEEP YOU FROM GETTING AS MUCH DONE AT WORK, SCHOOL OR AT HOME: SOME OF THE TIME

## 2019-11-20 ASSESSMENT — ANXIETY QUESTIONNAIRES
GAD7 TOTAL SCORE: 10
2. NOT BEING ABLE TO STOP OR CONTROL WORRYING: SEVERAL DAYS
1. FEELING NERVOUS, ANXIOUS, OR ON EDGE: MORE THAN HALF THE DAYS
6. BECOMING EASILY ANNOYED OR IRRITABLE: NEARLY EVERY DAY
5. BEING SO RESTLESS THAT IT IS HARD TO SIT STILL: SEVERAL DAYS
3. WORRYING TOO MUCH ABOUT DIFFERENT THINGS: SEVERAL DAYS
4. TROUBLE RELAXING: MORE THAN HALF THE DAYS
7. FEELING AFRAID AS IF SOMETHING AWFUL MIGHT HAPPEN: NOT AT ALL

## 2019-11-20 ASSESSMENT — PAIN SCALES - GENERAL: PAINLEVEL: SEVERE PAIN (7)

## 2019-11-20 ASSESSMENT — PATIENT HEALTH QUESTIONNAIRE - PHQ9: SUM OF ALL RESPONSES TO PHQ QUESTIONS 1-9: 13

## 2019-11-20 NOTE — PROGRESS NOTES
Progress Note  Disclaimer: This note consists of symbols derived from keyboarding, dictation and/or voice recognition software. As a result, there may be errors in the script that have gone undetected. Please consider this when interpreting information found in this chart.    Client Name: Bia Bill  Date: 11/18/2019         Service Type: Individual      Session Start Time: 12:00 PM   session End Time 12:45 PM   session Length: 45     Session #: 48     Attendees: Client attended alone    Treatment Plan Last Reviewed: 11/6/2019  PHQ-9 / CHIN-7 : See flowsheet     DATA  Client presents working on her own without cane or walker today.  She reports she is feeling much better, has more energy.  She has not smoked since our last appointment and is trying to get the smoke smell out of her house and her clothes.  It is proving to be a challenge.  She has been battling upper respiratory infections and bronchitis for the better part of a month.   Progress Since Last Session (Related to Symptoms / Goals / Homework):   Symptoms: Stable    Homework: Achieved / completed to satisfaction      Episode of Care Goals: Satisfactory progress - ACTION (Actively working towards change); Intervened by reinforcing change plan / affirming steps taken     Current / Ongoing Stressors and Concerns:   Recurrent depression  family relational problems, medical problems, COPD, chronic pain, trauma history.     Treatment Objective(s) Addressed in This Session:   Increase interest, engagement, and pleasure in doing things  Decrease frequency and intensity of feeling down, depressed, hopeless interpersonal assertiveness       Intervention:   CBT: Behavioral activation supported client's effort at establishing maintaining healthy boundaries with her family.  Congratulated client on eliminating her smoking.     ASSESSMENT: Current Emotional / Mental Status (status of significant symptoms):   Risk  status (Self / Other harm or suicidal ideation)   Client denies current fears or concerns for personal safety.   Client denies current or recent suicidal ideation or behaviors.   Client denies current or recent homicidal ideation or behaviors.   Client denies current or recent self injurious behavior or ideation.   Client denies other safety concerns.   A safety and risk management plan has not been developed at this time, however client was given the after-hours number / 911 should there be a change in any of these risk factors.     Appearance:   Appropriate    Eye Contact:   Good    Psychomotor Behavior: Normal    Attitude:   Cooperative    Orientation:   All   Speech    Rate / Production: Normal     Volume:  Normal    Mood:    Normal   Affect:    Appropriate    Thought Content:  Clear    Thought Form:  Coherent  Logical    Insight:    Good      Medication Review:   No changes to current psychiatric medication(s)     Medication Compliance:   Yes     Changes in Health Issues:   None reported     Chemical Use Review:   Substance Use: Chemical use reviewed, no active concerns identified      Tobacco Use: Client reports she is down to 1 cigarette per day from 2 packs/day.   Collateral Reports Completed:   Not Applicable    PLAN: (Client Tasks / Therapist Tasks / Other)  Client to continue With her self-care routine and maintenance of boundaries.    Practice one to 2 grounding techniques each day. Client will Identify 1-2Creative activities or hobbies  and engage in them 2 times per week  Client to maintain sobriety and contact with supportive sober others.  Continue medication compliance.    Kd Morris                                                         ________________________________________________________________________    Treatment Plan    Client's Name: Bia Bill  YOB: 1965    Date: 10/24/2017      DSM5 Diagnoses: (Sustained by DSM5 Criteria Listed Above)  Diagnoses: 296.40  Bipolar I Disorder Current or Most Recent Episode Manic, Unspecified  Psychosocial & Contextual Factors: financial difficulties, chronic pain, roommate issues  WHODAS 2.0 (12 item)               This questionnaire asks about difficulties due to health conditions. Health conditions             include disease or illnesses, other health problems that may be short or long lasting,             injuries, mental health or emotional problems, and problems with alcohol or drugs.                         Think back over the past 30 days and answer these questions, thinking about how much             difficulty you had doing the following activities. For each question, please [place] only             one response.      S1  Standing for long periods such as 30 minutes?  Mild =           2    S2  Taking care of household responsibilities?  Moderate =   3    S3  Learning a new task, for example, learning how to get to a new place?  Mild =           2    S4  How much of a problem do you have joining community activities (for example, festivals, Faith or other activities) in the same way as anyone else can?  Moderate =   3    S5  How much have you been emotionally affected by your health problems?  Mild =           2        In the past 30 days, how much difficulty did you have in:    S6  Concentrating on doing something for ten minutes?  Mild =           2    S7  Walking a long distance such as a kilometer (or equivalent)?  Severe =       4    S8  Washing your whole body?  None =         1    S9  Getting dressed?  None =         1    S10  Dealing with people you do not know?  Moderate =   3    S11  Maintaining a friendship?  Severe =       4    S12  Your day to day work?  Moderate =   3       H1  Overall, in the past 30 days, how many days were these difficulties present?  Record number of days seven    H2  In the past 30 days, for how many days were you totally unable to carry out your usual activities or work because of any  health condition?  Record number of days  seven    H3  In the past 30 days, not counting the days that you were totally unable, for how many days did you cut back or reduce your usual activities or work because of any health condition?  Record number of days five                   Referral / Collaboration:  Referral to another professional/service is not indicated at this time..    Anticipated number of session or this episode of care: 15    Goals  1. Education- the Biopsychosocial model of depression  a. Client will be able to describe how depression is effecting them physically, emotionally and socially  2. Behavioral Activation  a. Client will learn to assess their depression on a day to day basis  b. Client will Identify two forms of exercise/activity and engage in them 3 times per week  c. Client will Identify 3 things that make them laugh, and engage in these 5 times per week.  d. Client will Identify 1-2Creative activities or hobbies  and engage in them 2 times per week  e. Client will identify music, movies, books that make them feel good and use them 3-4 times per week  3. Self-care  a. Client will identify 5 things they can do just for themselves  b. Client will take time for quiet, reflection, meditation 5 times per week  c. Client will Learn to set boundaries when appropriate  d. Client will Identify 2 individuals they can call on for support, distraction  4. Assessment of progress  a. Client will engage in assessment of their progress on a regular basis    Bipolar Disorder  Treatment plan:  1. Education- the Biopsychosocial model of Bipolar Disorder    a. Client will be able to describe in general terms what Bipolar Disorder  is and is not  b. Client will be able to describe how Bipolar Disorder  has affected their life in at least two different areas, such as school or work and Home/relationships  c. Clients parents/guardians or significant others will be provided information on what Bipolar Disorder   is and the ways it can affect relationships and be encouraged to be a part of clients treatment team.  2. Medication  a. Client will participate in medication evaluation for Bipolar Disorder  symptoms and follow medication recommendations.   3. Identification and management of triggers  a. Client and therapist will examine patient s history to determine if there are predictable triggers for manic or depressive episodes (e.g. boredom, anger, family stress)  b. Client and therapist will develop means of diffusing these triggers (e.g. relaxation strategies, boundary setting, anger management)  4. Comorbid conditions   a. Client and therapist will asses for comorbid conditions (e.g. anxiety, depression, substance use). and add additional items to treatment plan as needed  5. Self-care  a. Client will identify 3 things they can do just for themselves  b. Client will take time for quiet, reflection, meditation 3 times per week  c. Client will Learn to set boundaries when appropriate  d. Client will Identify 2 individuals they can call on for support, distraction  5. Behavioral Activation  a. Client will Identify two forms of exercise/activity and engage in them 3 times per week  b. Client will Identify 2 things that make them laugh, and engage in these 3 times per week.  c. Client will Identify 2 Creative activities or hobbies  and engage in them 2 times per week  d. Client will identify music, movies, books that make them feel good and use them 3 times per week  6. Assessment of progress  a. Client will engage in assessment of their progress on a regular basis    Client has reviewed and agreed to the above plan.      Kd Morris  4/3/2018

## 2019-11-20 NOTE — PROGRESS NOTES
SUBJECTIVE:                                                    Bia Bill is a 53 year old female who presents to clinic today for the following health issues:      Hospital Follow-up Visit:    Hospital/Nursing Home/IP Rehab Facility: [facility]  Date of Admission: 11/08/2019  Date of Discharge: 11/10/2019  Reason(s) for Admission: Bronchitis            Problems taking medications regularly:  None       Medication changes since discharge: None       Problems adhering to non-medication therapy:  None    Lymphedema worse  Weight up.   Summary of hospitalization:  [facility] discharge summary reviewed  Diagnostic Tests/Treatments reviewed.  Follow up needed: none  Other Healthcare Providers Involved in Patient s Care:         None  Update since discharge: improved.     Post Discharge Medication Reconciliation: discharge medications reconciled, continue medications without change.  Plan of care communicated with patient     Coding guidelines for this visit:  Type of Medical   Decision Making Face-to-Face Visit       within 7 Days of discharge Face-to-Face Visit        within 14 days of discharge   Moderate Complexity [id number]   High Complexity [id number]              Problem list and histories reviewed & adjusted, as indicated.  Additional history:     Patient Active Problem List   Diagnosis     Mild persistent asthma     Polyneuropathy in other diseases classified elsewhere (H)     DVT (deep venous thrombosis) (H)     Alcohol abuse, in remission     SECONDARY POLYCYTHEMIA     Chondromalacia of patella     Developmental reading disorder     Esophageal reflux     Hypothyroidism     Fatty liver     GILES (Obstructive Sleep Apnea)-Moderate (AHI 16)     RLS (restless legs syndrome)     Smoker     Erythrocytosis     Moderate mixed bipolar I disorder (H)     Personality disorder, depressive     COPD (chronic obstructive pulmonary disease) (H)     Rosacea     [facility]     DDD (degenerative  disc disease), cervical     Benign neoplasm of colon (POLYPOSIS)     History of stroke symptoms, stroke ruled out on imaging     Somatization disorder     Sebaceous cyst of right axilla     HTN, goal below 140/90     Left leg pain     Vitamin D deficiency     Morbid obesity (H)     PVD (peripheral vascular disease) with claudication (H)     Cyst of left ovary     Morbid obesity with alveolar hypoventilation (H)     Severe episode of recurrent major depressive disorder (H)     Conductive hearing loss of left ear with restricted hearing of right ear     Sensorineural hearing loss (SNHL) of right ear with restricted hearing of left ear     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     Cellulitis     COPD exacerbation (H)     Personal history of DVT (deep vein thrombosis)     Acute respiratory failure with hypoxia and hypercapnia (H)     Lymphedema of both lower extremities     Bronchitis     Past Surgical History:   Procedure Laterality Date     BONE MARROW BIOPSY, BONE SPECIMEN, NEEDLE/TROCAR N/A 11/17/2014    Procedure: BIOPSY BONE MARROW;  Surgeon: Hay Aaron MD;  Location: [facility]     D & C  10/26/09    with uterine ablation     ENDOVASCULAR PLACEMENT VASCULAR DEVICE Left     Left leg stent x 3     ESOPHAGOSCOPY, GASTROSCOPY, DUODENOSCOPY (EGD), COMBINED  4/21/2014    Procedure: Gastroscopy;  Surgeon: Moris Thomas MD;  Location: [facility]     HYSTERECTOMY, PAP NO LONGER INDICATED  1-4-2010     LAPAROSCOPIC CHOLECYSTECTOMY N/A 1/4/2019    Procedure: Laparoscopic cholecystectomy;  Surgeon: Aaron Siegel MD;  Location: [facility]     LITHOTRIPSY  2004    Lithotrypsy       Social History     Tobacco Use     Smoking status: Current Every Day Smoker     Packs/day: 0.50     Years: 34.00     Pack years: 17.00     Types: Cigarettes     Smokeless tobacco: Never Used     Tobacco comment: started at age 11.  Quit during pregnancyX4.  Quit 3 months ago.    Substance Use  Topics     Alcohol use: No     Alcohol/week: 0.0 standard drinks     Comment: quit 1995     Family History   Problem Relation Age of Onset     Hypertension Mother      Diabetes Mother      Blood Disease Mother      Heart Disease Mother         chf     Cerebrovascular Disease Mother      Hypertension Father      Cancer Father         lung cancer     Allergies Sister      Diabetes Sister      Depression Sister      Hypertension Sister          Current Outpatient Medications   Medication Sig Dispense Refill     acetaminophen (TYLENOL) 500 MG tablet Take 500-1,000 mg by mouth every 6 hours as needed for mild pain       albuterol (PROAIR HFA/PROVENTIL HFA/VENTOLIN HFA) 108 (90 Base) MCG/ACT inhaler Inhale 2 puffs into the lungs every 6 hours as needed for shortness of breath / dyspnea or wheezing 1 Inhaler 11     albuterol (PROVENTIL) (2.5 MG/3ML) 0.083% neb solution Take 1 vial (2.5 mg) by nebulization every 6 hours as needed for shortness of breath / dyspnea or wheezing 1 Box 0     allopurinol (ZYLOPRIM) 300 MG tablet Take 1 tablet (300 mg) by mouth daily 90 tablet 3     ARIPiprazole (ABILIFY) 10 MG tablet TAKE ONE TABLET BY MOUTH EVERY DAY (Patient taking differently: Take 10 mg by mouth every evening ) 90 tablet 1     buPROPion (WELLBUTRIN XL) 150 MG 24 hr tablet TAKE ONE TABLET BY MOUTH EVERY DAY IN THE EVENING (Patient taking differently: Take 150 mg by mouth every evening TAKE ONE TABLET BY MOUTH EVERY DAY IN THE EVENING) 150 tablet 0     EPINEPHrine (EPIPEN/ADRENACLICK/OR ANY BX GENERIC EQUIV) 0.3 MG/0.3ML injection 2-pack Inject 0.3 mLs (0.3 mg) into the muscle once as needed for anaphylaxis 0.6 mL 0     fluticasone-salmeterol (ADVAIR HFA) 230-21 MCG/ACT inhaler Inhale 2 puffs into the lungs 2 times daily 1 Inhaler 3     furosemide (LASIX) 20 MG tablet TAKE TWO TABLETS BY MOUTH TWICE DAILY (Patient taking differently: Take 40 mg by mouth 2 times daily ) 360 tablet 3     gabapentin (NEURONTIN) 300 MG capsule TAKE  ONE CAPSULE BY MOUTH ONCE DAILY AT BEDTIME WITH A 600 MG TABLET FOR A TOTAL DOSE  MG (Patient taking differently: Take 300 mg by mouth At Bedtime TAKE ONE CAPSULE BY MOUTH ONCE DAILY AT BEDTIME WITH A 600 MG TABLET FOR A TOTAL DOSE  MG) 90 capsule 0     gabapentin (NEURONTIN) 600 MG tablet TAKE ONE TABLET BY MOUTH THREE TIMES A DAY (Patient taking differently: Take 600 mg by mouth 3 times daily TAKE ONE TABLET BY MOUTH THREE TIMES A DAY) 90 tablet 3     ipratropium - albuterol 0.5 mg/2.5 mg/3 mL (DUONEB) 0.5-2.5 (3) MG/3ML neb solution Take 1 vial (3 mLs) by nebulization every 6 hours as needed for shortness of breath / dyspnea or wheezing 30 vial 11     lamoTRIgine (LAMICTAL) 100 MG tablet Take 1 tablet (100 mg) by mouth daily 90 tablet 3     levothyroxine (SYNTHROID/LEVOTHROID) 150 MCG tablet Take 1 tablet (150 mcg) by mouth daily 90 tablet 2     mometasone-formoterol (DULERA) 200-5 MCG/ACT oral inhaler Inhale 2 puffs into the lungs 2 times daily 13 g 1     omeprazole (PRILOSEC) 20 MG DR capsule TAKE ONE CAPSULE BY MOUTH TWICE A DAY (Patient taking differently: Take 20 mg by mouth 2 times daily ) 180 capsule 1     order for DME Equipment being ordered: Nebulizer 1 Device 0     order for DME Equipment being ordered: CPAP  AIRSENSE 10  5-18 CM H20  SN# [id number]   DN# [id number]       order for DME Equipment being ordered: DEPENDS SIZE LARGE 60 Month 5     order for DME Equipment being ordered: INCONTINENCE PADS   QID 1 Month 11     potassium chloride ER (K-DUR/KLOR-CON M) 10 MEQ CR tablet TAKE ONE TABLET BY MOUTH ONCE DAILY (Patient taking differently: Take 10 mEq by mouth daily ) 90 tablet 0     pramipexole (MIRAPEX) 0.125 MG tablet TAKE 1-2 TABLETS BY MOUTH AT BEDTIME 60 tablet 10     predniSONE (DELTASONE) 20 MG tablet Take 2 tablets (40 mg) by mouth daily for 3 days, THEN 1 tablet (20 mg) daily for 4 days, THEN 0.5 tablets (10 mg) daily for 4 days. 12 tablet 0     simvastatin (ZOCOR) 20 MG tablet TAKE  ONE TABLET BY MOUTH EVERY NIGHT AT BEDTIME (Patient taking differently: Take 20 mg by mouth At Bedtime ) 90 tablet 3     traZODone (DESYREL) 150 MG tablet Take 1 tablet (150 mg) by mouth At Bedtime 90 tablet 1     VITAMIN D3 1000 units tablet TAKE ONE TABLET BY MOUTH ONCE DAILY (Patient taking differently: Take 1,000 Units by mouth daily ) 100 tablet 2     warfarin ANTICOAGULANT (JANTOVEN ANTICOAGULANT) 5 MG tablet 15 mg (5 mg x 3) every Sun, Tue, Thu; 12.5 mg (5 mg x 2.5) all other days OR AS DIRECTED BY  tablet 0     order for DME Equipment being ordered:x2 [employer] 30/40mmHg compression wraps with x2 extra prs of compression liners (Patient not taking: Reported on 11/20/2019) 1 each 0     Allergies   Allergen Reactions     Darvocet [Propoxyphene N-Apap] Anaphylaxis     Darvocet,Percocet      Percocet [Oxycodone-Acetaminophen] Anaphylaxis, Hives and Swelling     Has tolerated hydromorphone in the past.      Asa [Aspirin] Hives     Aspirin causes seizures and hives, throat swelling.   Has tolerated ketorolac in the past.        Bee      Ibuprofen Swelling     Throat swelling per patient      Lyrica [Pregabalin] Other (See Comments) and Swelling     dizziness     Povidone Iodine Rash     Reaction to topical betadine     Tape [Adhesive Tape] Rash       ROS:  Constitutional, HEENT, cardiovascular, pulmonary, gi and gu systems are negative, except as otherwise noted.    OBJECTIVE:                                                    /62   Pulse 83   Temp 98.7  F (37.1  C) (Tympanic)   Resp 20   Wt 109.7 kg (241 lb 14.4 oz)   LMP 09/27/2009   SpO2 97%   BMI 50.56 kg/m   Body mass index is 50.56 kg/m .   GENERAL: healthy, alert, well nourished, well hydrated, no distress  HENT: ear canals- normal; TMs- normal; Nose- normal; Mouth- no ulcers, no lesions  NECK: no tenderness, no adenopathy, no asymmetry, no masses, no stiffness; thyroid- normal to palpation  RESP: lungs clear to auscultation - no rales, no  rhonchi, no wheezes  CV: regular rates and rhythm, normal S1 S2, no S3 or S4 and no murmur, no click or rub -  ABDOMEN: soft, no tenderness, no  hepatosplenomegaly, no masses, normal bowel sounds  EXTM: 3+ pitting edema to mid caf.       ASSESSMENT/PLAN:                                                      (I89.0) Lymph edema  (primary encounter diagnosis)  Comment:  Compression stockngs are too tight.  Ace wraps applied.  She will increase lasix and follow up I  Patient Instructions   Increase to 3 lasix in am and 2 at lunch.    Follow up in lymphedema clinic.     See me in 3 weeks      Plan: LYMPHEDEMA THERAPY REFERRAL      (J45.30) Mild persistent asthma without complication  Plan: albuterol (PROAIR HFA/PROVENTIL HFA/VENTOLIN         HFA) 108 (90 Base) MCG/ACT inhaler    (J44.9) Chronic obstructive pulmonary disease, unspecified COPD type (H)  Plan: albuterol (PROAIR HFA/PROVENTIL HFA/VENTOLIN         HFA) 108 (90 Base) MCG/ACT inhaler,         fluticasone-salmeterol (ADVAIR HFA) 230-21         MCG/ACT inhaler         reports that she has been smoking cigarettes. She has a 17.00 pack-year smoking history. She has never used smokeless tobacco.  Tobacco Cessation Action Plan: Pharmacotherapies : cold turkey  She has not retarted since admision  Encouraged her to remain smoke free.  Weight management plan: Discussed healthy diet and exercise guidelines      [facility]

## 2019-11-20 NOTE — PATIENT INSTRUCTIONS
Increase to 3 lasix in am and 2 at lunch.    Follow up in lymphedema clinic.     See me in 3 weeks

## 2019-11-21 ASSESSMENT — ASTHMA QUESTIONNAIRES: ACT_TOTALSCORE: 15

## 2019-11-21 ASSESSMENT — ANXIETY QUESTIONNAIRES: GAD7 TOTAL SCORE: 10

## 2019-11-22 DIAGNOSIS — K21.9 GASTROESOPHAGEAL REFLUX DISEASE WITHOUT ESOPHAGITIS: ICD-10-CM

## 2019-11-22 NOTE — TELEPHONE ENCOUNTER
"Requested Prescriptions   Pending Prescriptions Disp Refills     omeprazole (PRILOSEC) 20 MG DR capsule [Pharmacy Med Name: OMEPRAZOLE 20MG CPDR]  Last Written Prescription Date:  5/7/19  Last Fill Quantity: 180,  # refills: 1   Last office visit: No previous visit found with prescribing provider:  genevieve   Future Office Visit:   Next 5 appointments (look out 90 days)    Dec 04, 2019  2:00 PM CST  Return Visit with Kd Morris  [facility] ([facility]) [address] [place] [address]  [phone]   Dec 11, 2019 10:40 AM CST  SHORT with Kd Leong MD  [facility] ([facility] [address] [address] [address]  [phone]   Dec 18, 2019  9:30 AM CST  Return Visit with Kd Morris  [facility] ([facility] [address] [place] [address]  [phone]   Dec 18, 2019 11:15 AM CST  Return Visit with Joshua Watts MD  [facility] ([facility]) [facility]  [address] [place] [id number]  [phone]          180 capsule 1     Sig: TAKE ONE CAPSULE BY MOUTH TWICE A DAY       PPI Protocol Passed - 11/22/2019  3:05 PM        Passed - Not on Clopidogrel (unless Pantoprazole ordered)        Passed - No diagnosis of osteoporosis on record        Passed - Recent (12 mo) or future (30 days) visit within the authorizing provider's specialty     Patient has had an office visit with the authorizing provider or a provider within the authorizing providers department within the previous 12 mos or has a future within next 30 days. See \"Patient Info\" tab in inbasket, or \"Choose Columns\" in Meds & Orders section of the refill encounter.              Passed - Medication is active on med list        Passed - Patient is age 18 or older        Passed - No active pregnacy on record        Passed - No positive pregnancy test in past 12 months          "

## 2019-11-26 ENCOUNTER — HOSPITAL ENCOUNTER (OUTPATIENT)
Dept: PHYSICAL THERAPY | Facility: CLINIC | Age: 54
Setting detail: THERAPIES SERIES
End: 2019-11-26
Attending: FAMILY MEDICINE
Payer: COMMERCIAL

## 2019-11-26 DIAGNOSIS — I89.0 LYMPH EDEMA: ICD-10-CM

## 2019-11-26 DIAGNOSIS — I82.409 DVT (DEEP VENOUS THROMBOSIS) (H): ICD-10-CM

## 2019-11-26 DIAGNOSIS — Z79.01 LONG TERM CURRENT USE OF ANTICOAGULANT THERAPY: ICD-10-CM

## 2019-11-26 DIAGNOSIS — Z86.73 HISTORY OF STROKE: ICD-10-CM

## 2019-11-26 DIAGNOSIS — E03.9 HYPOTHYROIDISM, UNSPECIFIED TYPE: ICD-10-CM

## 2019-11-26 LAB
HCT VFR BLD AUTO: 50.7 % (ref 35–47)
INR PPP: 1.27 (ref 0.86–1.14)
T4 FREE SERPL-MCNC: 1.3 NG/DL (ref 0.76–1.46)
TSH SERPL DL<=0.005 MIU/L-ACNC: 6.22 MU/L (ref 0.4–4)

## 2019-11-26 PROCEDURE — 84439 ASSAY OF FREE THYROXINE: CPT | Performed by: PHYSICIAN ASSISTANT

## 2019-11-26 PROCEDURE — 84443 ASSAY THYROID STIM HORMONE: CPT | Performed by: PHYSICIAN ASSISTANT

## 2019-11-26 PROCEDURE — 85014 HEMATOCRIT: CPT | Performed by: FAMILY MEDICINE

## 2019-11-26 PROCEDURE — 85610 PROTHROMBIN TIME: CPT | Performed by: FAMILY MEDICINE

## 2019-11-26 PROCEDURE — 97140 MANUAL THERAPY 1/> REGIONS: CPT | Mod: GP | Performed by: PHYSICAL THERAPIST

## 2019-11-26 PROCEDURE — 97161 PT EVAL LOW COMPLEX 20 MIN: CPT | Mod: GP | Performed by: PHYSICAL THERAPIST

## 2019-11-26 PROCEDURE — 36415 COLL VENOUS BLD VENIPUNCTURE: CPT | Performed by: PHYSICIAN ASSISTANT

## 2019-11-26 NOTE — PROGRESS NOTES
11/26/19 1000   Rehab Discipline   Discipline PT   Type of Visit   Type of visit Initial Edema Evaluation       present No   General Information   Start of care 11/26/19   Referring physician Dr. Dang MD   Orders Evaluate and treat as indicated   Order date 11/20/19   Medical diagnosis BLE secondary lymphedema, chronic   Edema onset   (11/20/19 - date of referral)   Affected body parts RLE;LLE   Edema etiology comments chronic LE edema, history of DVT when pregnant (1992) and again in 2004 which required hospitalization and intervention at [facility]   Pertinent history of current problem (PT: include personal factors and/or comorbidities that impact the POC; OT: include additional occupational profile info) last seen at this OP lymphedema clinic from 4/26/18 - 6/12/18 and at that time was discharged wearing knee hi velcro compression wraps for BLEs (Compreflex Lite); reports velcro wraps wore out and hasn't worn since about the beginning of this year; edema best in the morning and swelling worsens with time on feet and then pain worsens as well; has ACE wraps around BLEs into clinic today that Dr. Leong started on 11/20/19; currently on a water pill 2x/day   Surgical / medical history reviewed Yes   Edema special tests   (see above for DVT history)   Prior level of functional mobility mod-I using AD into clinic today   Prior treatment Compression garments;Elevation;Diuretics;Gradient compression bandaging   Patient role / employment history Disabled   [facility]   Living environment comments lives alone   Assistive device comments no AD into clinic today, but often uses cane   Fall Risk Screen   Fall screen completed by PT   Have you fallen 2 or more times in the past year? No   Have you fallen and had an injury in the past year? No   Is patient a fall risk? No   Abuse Screen (yes response referral indicated)   Feels Unsafe at Home or Work/School no   Feels  Threatened by Someone no   Does Anyone Try to Keep You From Having Contact with Others or Doing Things Outside Your Home? no   Physical Signs of Abuse Present no   System Outcome Measures   Outcome Measures Lymphedema   Lymphedema Life Impact Scale (score range 0-72). A higher score indicates greater impairment. 25   Subjective Report   Patient report of symptoms legs are heavy, tight and painful   Precautions   Precautions comments no known precautions   Patient / Family Goals   Patient / family goals statement to make the swelling better in my legs and keep it controlled   Pain   Patient currently in pain Yes   Pain location legs   Pain rating 3/10   Cognitive Status   Orientation Orientation to person, place and time   Level of consciousness Alert   Follows commands and answers questions 100% of the time   Personal safety and judgement Intact   Memory Intact   Edema Exam / Assessment   Skin condition Intact;Dryness   Skin condition comments BLE skin intact with 2+ piting present from feet to knee that is slightly firm, skin is tight and full ankles to knee B, chronic hemosiderian staining present and feet, heels very dry; over grown toenails B; non-pitting full edema at knee and into thighs (L>R)   Scar No   Capillary refill Symmetrical   Dorsal pedal pulse Symmetrical   Stemmer sign Negative   Ulceration No   Girth Measurements   Girth Measurements Refer to separate girth measurement flowsheet   Volume LE   Right LE (mL) 3457.09   Left LE (mL) 3689.02   LE volume comparison LLE volume greater than RLE volume   % difference 6.7%   Range of Motion   ROM No deficits were identified   ROM comments BLE WFL   Strength   Strength No deficits were identified   Strength comments BLE WFL   Posture   Posture Forward head position   Palpation   Palpation denies hypersensitivities   Sensory   Sensory perception Light touch   Light touch Intact   Vascular Assessment   Vascular Assessment Comments venous contributor to edema  due to history of DVTs and CVI presentation today   Coordination   Coordination Gross motor coordination appropriate   Muscle Tone   Muscle tone No deficits were identified   Planned Edema Interventions   Planned edema interventions Gradient compression bandaging;Fit for compression garment;Exercises;Precautions to prevent infection / exacerbation;Education;Manual therapy;Manual lymph drainage;Skin care / precautions;Home management program development   Clinical Impression   Criteria for skilled therapeutic intervention met Yes   Therapy diagnosis BLE secondary lympehdema, chronic, with venous component   Influenced by the following impairments / conditions Stage 2;Phlebolymphedema   Functional limitations due to impairments / conditions unable to wear shoes or boots, only slipper right now   Clinical Presentation Stable/Uncomplicated   Clinical Presentation Rationale clinical judgement; chronic edema; no weeping/wounds   Clinical Decision Making (Complexity) Low complexity   Treatment Frequency 3x/week   Treatment duration 12 weeks   Patient / family and/or staff in agreement with plan of care Yes   Risks and benefits of therapy have been explained Yes   Education Assessment   Preferred learning style Listening   Barriers to learning No barriers   Goals   Edema Eval Goals 1;2;3;4   Goal 1   Goal identifier stg   Goal description pt to have around the clock tolerance to BLE GCB for edema reduction response   Target date 12/10/19   Goal 2   Goal identifier ltg   Goal description once appropriate, pt to be independent with donning, doffing and care of compression garments for longterm edema management for maintenance   Target date 02/24/20   Goal 3   Goal identifier ltg   Goal description pt to be independent with longterm edema management for maintenance via HEP, elevation, skin cares and compression garment wear/use   Target date 02/24/20   Goal 4   Goal identifier ltg   Goal description pt to have at least 5 point  improvement on LLIS due to decreased edema in BLEs   Target date 02/24/20   Total Evaluation Time   PT Eval, Low Complexity Minutes (80929) 10

## 2019-11-26 NOTE — PROGRESS NOTES
[facility]      OUTPATIENT PHYSICAL THERAPY EDEMA EVALUATION  PLAN OF TREATMENT FOR OUTPATIENT REHABILITATION  (COMPLETE FOR INITIAL CLAIMS ONLY)  Patient's Last Name, First Name, TEGANROYA BillBia  S                           Provider s Name:   [facility] Medical Record No.  [id number]     Start of Care Date:  11/26/19   Onset Date:  (11/20/19 - date of referral)   Type:  PT   Medical Diagnosis:      Therapy Diagnosis:  BLE secondary lympehdema, chronic, with venous component Visits from SOC:  1                                     __________________________________________________________________________________   Plan of Treatment/Functional Goals:    Gradient compression bandaging, Fit for compression garment, Exercises, Precautions to prevent infection / exacerbation, Education, Manual therapy, Manual lymph drainage, Skin care / precautions, Home management program development        GOALS  1. Goal description: pt to have around the clock tolerance to BLE GCB for edema reduction response       Target date: 12/10/19  2. Goal description: once appropriate, pt to be independent with donning, doffing and care of compression garments for longterm edema management for maintenance       Target date: 02/24/20  3. Goal description: pt to be independent with longterm edema management for maintenance via HEP, elevation, skin cares and compression garment wear/use       Target date: 02/24/20  4. Goal description: pt to have at least 5 point improvement on LLIS due to decreased edema in BLEs       Target date: 02/24/20    Treatment Frequency: 3x/week   Treatment duration: 12 weeks (11/26/19 - 2/24/20)    Joseline Santana, PT, DPT, CLT                                    I CERTIFY THE NEED FOR THESE SERVICES FURNISHED UNDER        THIS PLAN OF TREATMENT AND WHILE UNDER MY  CARE     (Physician co-signature of this document indicates review and certification of the therapy plan).                      Referring physician: Dr. Dang MD   Initial Assessment  See Epic Evaluation- Start of care: 11/26/19

## 2019-11-26 NOTE — TELEPHONE ENCOUNTER
Routing refill request to provider for review/approval because:  Patient recently seen in clinic.    Bailey Lozano RN

## 2019-11-27 ENCOUNTER — ANTICOAGULATION THERAPY VISIT (OUTPATIENT)
Dept: ANTICOAGULATION | Facility: CLINIC | Age: 54
End: 2019-11-27
Payer: COMMERCIAL

## 2019-11-27 DIAGNOSIS — Z86.73 HISTORY OF STROKE: ICD-10-CM

## 2019-11-27 DIAGNOSIS — I73.9 PVD (PERIPHERAL VASCULAR DISEASE) WITH CLAUDICATION (H): ICD-10-CM

## 2019-11-27 DIAGNOSIS — I82.409 DVT (DEEP VENOUS THROMBOSIS) (H): ICD-10-CM

## 2019-11-27 PROCEDURE — 99207 ZZC NO CHARGE NURSE ONLY: CPT

## 2019-11-27 NOTE — PROGRESS NOTES
19  Writer spoke with the patient and verified she did receive the message. Dosing was confirmed. Next INR on .    Reina Forrest RN, BSN, PHN  Anticoagulation Clinic   [phone]        ANTICOAGULATION FOLLOW-UP CLINIC VISIT    Patient Name:  Bia Bill  Date:  2019  Contact Type:  Telephone/ Left DMV    SUBJECTIVE:  Patient Findings     Comments:   Writer left DVM. Requested a call back to [facility].   Writer instructed the patient to take 20 mg today then to resume the maintenance dose.   Recheck in 5 days.        Clinical Outcomes     Negatives:   Major bleeding event, Thromboembolic event, Anticoagulation-related hospital admission, Anticoagulation-related ED visit, Anticoagulation-related fatality    Comments:   Writer left DVM. Requested a call back to [facility].   Writer instructed the patient to take 20 mg today then to resume the maintenance dose.   Recheck in 5 days.           OBJECTIVE    INR   Date Value Ref Range Status   2019 1.27 (H) 0.86 - 1.14 Final       ASSESSMENT / PLAN  INR assessment SUB    Recheck INR In: 5 DAYS    INR Location Outside lab      Anticoagulation Summary  As of 2019    INR goal:   2.0-3.0   TTR:   29.3 % (11.2 mo)   INR used for dosin.27! (2019)   Warfarin maintenance plan:   15 mg (5 mg x 3) every Sun, Tue, Thu; 12.5 mg (5 mg x 2.5) all other days   Full warfarin instructions:   : 20 mg; Otherwise 15 mg every Sun, Tue, Thu; 12.5 mg all other days   Weekly warfarin total:   95 mg   Plan last modified:   Reina Forrest RN (2019)   Next INR check:   2019   Priority:   Maintenance   Target end date:       Indications    PVD (peripheral vascular disease) with claudication (H) [I73.9]  Long term current use of anticoagulant therapy (Resolved) [Z79.01]  DVT (deep venous thrombosis) (H) [I82.409]  History of stroke symptoms  stroke ruled out on imaging [Z86.73]             Anticoagulation Episode Summary     INR check  location:       Preferred lab:       Send INR reminders to:   [place] PHONE [facility]TICOAG POOL    Comments:   * lab draw due to elevated hematocrit (fingerstick meters don't work). LEFT LE. STENT x 3 LEFT UPPER LEG. Previous arterial clot. AS of 10/9/19 Goal range 2.0-3.0.      Anticoagulation Care Providers     Provider Role Specialty Phone number    Kd Leong MD [facility] [phone]            See the Encounter Report to view Anticoagulation Flowsheet and Dosing Calendar (Go to Encounters tab in chart review, and find the Anticoagulation Therapy Visit)        Reina Forrest RN

## 2019-11-27 NOTE — PROGRESS NOTES
11/27/19    Writer left  requesting a call back. No dosing instructions given.     Reina Forrest RN, BSN, PHN  Anticoagulation Clinic   [phone]

## 2019-11-27 NOTE — ADDENDUM NOTE
Addended by: LEONELA WYATT on: 11/27/2019 02:12 PM     Modules accepted: Level of Service, SmartSet

## 2019-11-29 DIAGNOSIS — E03.9 HYPOTHYROIDISM, UNSPECIFIED TYPE: Primary | Chronic | ICD-10-CM

## 2019-11-29 RX ORDER — LEVOTHYROXINE SODIUM 175 UG/1
175 TABLET ORAL DAILY
Qty: 90 TABLET | Refills: 0 | Status: SHIPPED | OUTPATIENT
Start: 2019-11-29 | End: 2020-02-13

## 2019-12-02 ENCOUNTER — ANTICOAGULATION THERAPY VISIT (OUTPATIENT)
Dept: ANTICOAGULATION | Facility: CLINIC | Age: 54
End: 2019-12-02

## 2019-12-02 ENCOUNTER — HOSPITAL ENCOUNTER (OUTPATIENT)
Dept: PHYSICAL THERAPY | Facility: CLINIC | Age: 54
Setting detail: THERAPIES SERIES
End: 2019-12-02
Attending: FAMILY MEDICINE
Payer: COMMERCIAL

## 2019-12-02 DIAGNOSIS — I82.409 DVT (DEEP VENOUS THROMBOSIS) (H): ICD-10-CM

## 2019-12-02 DIAGNOSIS — I73.9 PVD (PERIPHERAL VASCULAR DISEASE) WITH CLAUDICATION (H): ICD-10-CM

## 2019-12-02 DIAGNOSIS — E03.9 HYPOTHYROIDISM, UNSPECIFIED TYPE: Chronic | ICD-10-CM

## 2019-12-02 DIAGNOSIS — Z86.73 HISTORY OF STROKE: ICD-10-CM

## 2019-12-02 DIAGNOSIS — Z79.01 LONG TERM CURRENT USE OF ANTICOAGULANT THERAPY: ICD-10-CM

## 2019-12-02 LAB
HCT VFR BLD AUTO: 48.5 % (ref 35–47)
INR PPP: 2.96 (ref 0.86–1.14)
T4 FREE SERPL-MCNC: 1.37 NG/DL (ref 0.76–1.46)
TSH SERPL DL<=0.005 MIU/L-ACNC: 4.19 MU/L (ref 0.4–4)

## 2019-12-02 PROCEDURE — 97140 MANUAL THERAPY 1/> REGIONS: CPT | Mod: GP | Performed by: REHABILITATION PRACTITIONER

## 2019-12-02 PROCEDURE — 85014 HEMATOCRIT: CPT | Performed by: FAMILY MEDICINE

## 2019-12-02 PROCEDURE — 84439 ASSAY OF FREE THYROXINE: CPT | Performed by: PHYSICIAN ASSISTANT

## 2019-12-02 PROCEDURE — 99207 ZZC NO CHARGE NURSE ONLY: CPT

## 2019-12-02 PROCEDURE — 84443 ASSAY THYROID STIM HORMONE: CPT | Performed by: PHYSICIAN ASSISTANT

## 2019-12-02 PROCEDURE — 36415 COLL VENOUS BLD VENIPUNCTURE: CPT | Performed by: FAMILY MEDICINE

## 2019-12-02 PROCEDURE — 85610 PROTHROMBIN TIME: CPT | Performed by: FAMILY MEDICINE

## 2019-12-02 RX ORDER — WARFARIN SODIUM 5 MG/1
TABLET ORAL
Qty: 240 TABLET | Refills: 0 | Status: SHIPPED | OUTPATIENT
Start: 2019-12-02 | End: 2019-12-18

## 2019-12-02 NOTE — PROGRESS NOTES
ADDENDUM: Patient called [facility] to confirm her dosing. She also requested a refill of warfarin; 90 day refill sent to [facility] per patient request.     Janine Velasquez RN 19 at 1:05 PM      ANTICOAGULATION FOLLOW-UP CLINIC VISIT    Patient Name:  Bia Bill  Date:  2019  Contact Type:  Face to Face    SUBJECTIVE:  Patient Findings     Positives:   Extra doses    Comments:   No changes in medications, activity, health, or diet noted. No bleeding or increased bruising noted. Took warfarin as prescribed.  Based on the last 4 doses, patient's maintenance dose would be 95 mg. Writer increased it to 97.5 mg weekly.   Recheck in 1 week.   Patient verbalizes understanding and agrees to plan. No further questions or concerns.          Clinical Outcomes     Negatives:   Major bleeding event, Thromboembolic event, Anticoagulation-related hospital admission, Anticoagulation-related ED visit, Anticoagulation-related fatality    Comments:   No changes in medications, activity, health, or diet noted. No bleeding or increased bruising noted. Took warfarin as prescribed.  Based on the last 4 doses, patient's maintenance dose would be 95 mg. Writer increased it to 97.5 mg weekly.   Recheck in 1 week.   Patient verbalizes understanding and agrees to plan. No further questions or concerns.             OBJECTIVE    INR   Date Value Ref Range Status   2019 2.96 (H) 0.86 - 1.14 Final       ASSESSMENT / PLAN  INR assessment THER    Recheck INR In: 1 WEEK    INR Location Outside lab      Anticoagulation Summary  As of 2019    INR goal:   2.0-3.0   TTR:   30.2 % (11.2 mo)   INR used for dosin.96 (2019)   Warfarin maintenance plan:   12.5 mg (5 mg x 2.5) every Mon, Wed, Fri; 15 mg (5 mg x 3) all other days   Full warfarin instructions:   12.5 mg every Mon, Wed, Fri; 15 mg all other days   Weekly warfarin total:   97.5 mg   Plan last modified:   Reina Forrest RN (2019)    Next INR check:   12/10/2019   Priority:   Maintenance   Target end date:   Indefinite    Indications    PVD (peripheral vascular disease) with claudication (H) [I73.9]  Long term current use of anticoagulant therapy (Resolved) [Z79.01]  DVT (deep venous thrombosis) (H) [I82.409]  History of stroke symptoms  stroke ruled out on imaging [Z86.73]             Anticoagulation Episode Summary     INR check location:       Preferred lab:       Send INR reminders to:   [place] PHONE [employer] POOL    Comments:   * lab draw due to elevated hematocrit (fingerstick meters don't work). LEFT LE. STENT x 3 LEFT UPPER LEG. Previous arterial clot. AS of 10/9/19 Goal range 2.0-3.0.      Anticoagulation Care Providers     Provider Role Specialty Phone number    Kd Leong MD [facility] [phone]            See the Encounter Report to view Anticoagulation Flowsheet and Dosing Calendar (Go to Encounters tab in chart review, and find the Anticoagulation Therapy Visit)        Reina Forrest RN

## 2019-12-04 ENCOUNTER — HOSPITAL ENCOUNTER (OUTPATIENT)
Dept: PHYSICAL THERAPY | Facility: CLINIC | Age: 54
Setting detail: THERAPIES SERIES
End: 2019-12-04
Attending: FAMILY MEDICINE
Payer: COMMERCIAL

## 2019-12-04 ENCOUNTER — HOSPITAL ENCOUNTER (EMERGENCY)
Facility: CLINIC | Age: 54
Discharge: HOME | End: 2019-12-04
Attending: FAMILY MEDICINE | Admitting: FAMILY MEDICINE
Payer: COMMERCIAL

## 2019-12-04 VITALS
DIASTOLIC BLOOD PRESSURE: 73 MMHG | BODY MASS INDEX: 50.49 KG/M2 | OXYGEN SATURATION: 95 % | RESPIRATION RATE: 18 BRPM | TEMPERATURE: 98.2 F | WEIGHT: 241.56 LBS | SYSTOLIC BLOOD PRESSURE: 133 MMHG

## 2019-12-04 DIAGNOSIS — L03.116 CELLULITIS OF LEFT LOWER EXTREMITY: ICD-10-CM

## 2019-12-04 PROCEDURE — 25000128 H RX IP 250 OP 636: Performed by: FAMILY MEDICINE

## 2019-12-04 PROCEDURE — 99284 EMERGENCY DEPT VISIT MOD MDM: CPT | Mod: 25 | Performed by: FAMILY MEDICINE

## 2019-12-04 PROCEDURE — 99284 EMERGENCY DEPT VISIT MOD MDM: CPT | Mod: Z6 | Performed by: FAMILY MEDICINE

## 2019-12-04 PROCEDURE — 97140 MANUAL THERAPY 1/> REGIONS: CPT | Mod: GP | Performed by: REHABILITATION PRACTITIONER

## 2019-12-04 PROCEDURE — 96374 THER/PROPH/DIAG INJ IV PUSH: CPT | Performed by: FAMILY MEDICINE

## 2019-12-04 RX ORDER — CEPHALEXIN 500 MG/1
500 CAPSULE ORAL 4 TIMES DAILY
Qty: 40 CAPSULE | Refills: 0 | Status: ON HOLD | OUTPATIENT
Start: 2019-12-04 | End: 2019-12-12

## 2019-12-04 RX ORDER — CEFAZOLIN SODIUM 2 G/100ML
2 INJECTION, SOLUTION INTRAVENOUS ONCE
Status: COMPLETED | OUTPATIENT
Start: 2019-12-04 | End: 2019-12-04

## 2019-12-04 RX ADMIN — CEFAZOLIN SODIUM 2 G: 2 INJECTION, SOLUTION INTRAVENOUS at 14:42

## 2019-12-04 NOTE — ED PROVIDER NOTES
History     Chief Complaint   Patient presents with     Cellulitis     cellulitis to left inner thigh, sent from lymphedema clinic     HPI  Bia Bill is a 54 year old female with history of bilateral lower extremity lymphedema, DVT currently on warfarin, COPD, alcohol abuse in remission, hypothyroidism, moderate mixed bipolar I disorder, personality disorder, morbid obesity, PVD and hypertension who presents to the Emergency Department with concern for left inner thigh redness and swelling. Symptoms began yesterday morning. Patient was seen in the lymphedema clinic today and sent for further evaluation with concern for cellulitis. She reports chills but no fever. She does have a history of cellulitis. She notes mild left calf discomfort but has no other concerns today. She denies respiratory symptoms, shortness of breath, chest pain, changes in bowel or bladder habits. Patient is a smoker, down to 1 cigarette per day from 2 packs per day.     Allergies:  Allergies   Allergen Reactions     Darvocet [Propoxyphene N-Apap] Anaphylaxis     Darvocet,Percocet      Percocet [Oxycodone-Acetaminophen] Anaphylaxis, Hives and Swelling     Has tolerated hydromorphone in the past.      Asa [Aspirin] Hives     Aspirin causes seizures and hives, throat swelling.   Has tolerated ketorolac in the past.        Bee      Ibuprofen Swelling     Throat swelling per patient      Lyrica [Pregabalin] Other (See Comments) and Swelling     dizziness     Povidone Iodine Rash     Reaction to topical betadine     Tape [Adhesive Tape] Rash       Problem List:    Patient Active Problem List    Diagnosis Date Noted     Bronchitis 11/09/2019     Priority: Medium     COPD exacerbation (H) 10/29/2019     Priority: Medium     Personal history of DVT (deep vein thrombosis) 10/29/2019     Priority: Medium     She had a DVT post pregnancy and delivery in 1992.   3/26/2004: Hospitalized at [facility] for extensive left lower leg DVT.  This occurred  a month after pneumonia episode and decreased activity.  She had lytic therapy, tPA followed by Possis mechanical thrombectomy device and three stents in veins.  She did have Groshong catheter at the time.  There was a positive lupus anticoagulant, negative Factor V and cardiolipin clement.        Acute respiratory failure with hypoxia and hypercapnia (H) 10/29/2019     Priority: Medium     Lymphedema of both lower extremities 10/29/2019     Priority: Medium     Cellulitis 06/06/2019     Priority: Medium     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 10/03/2018     Priority: Medium     Conductive hearing loss of left ear with restricted hearing of right ear 04/17/2018     Priority: Medium     Sensorineural hearing loss (SNHL) of right ear with restricted hearing of left ear 04/17/2018     Priority: Medium     Morbid obesity with alveolar hypoventilation (H) 02/01/2017     Priority: Medium     Cyst of left ovary 01/11/2016     Priority: Medium     PVD (peripheral vascular disease) with claudication (H) 10/30/2015     Priority: Medium     LEFT LE. STENT x 3 LEFT UPPER LEG       Morbid obesity (H) 06/17/2015     Priority: Medium     Vitamin D deficiency 11/05/2014     Priority: Medium     Problem list name updated by automated process. Provider to review       Sebaceous cyst of right axilla 10/14/2014     Priority: Medium     HTN, goal below 140/90 10/14/2014     Priority: Medium     Left leg pain 10/14/2014     Priority: Medium     History of stroke symptoms, stroke ruled out on imaging 07/10/2014     Priority: Medium     Admissions in 2011 and 2014 for stroke-like symptoms. Evaluated by neurology on both occasions, imaging not consistent with stroke. Thought to be due to conversion disorder in 2011.       Somatization disorder 07/10/2014     Priority: Medium     Benign neoplasm of colon (POLYPOSIS) 06/04/2014     Priority: Medium     Specimen #: [id number]   Collected: 11/7/2017    Received: 11/7/2017   Reported: 11/9/2017 19:59   Ordering Phy(s): CARMEL GALLOWAY     For improved result formatting, select 'View Enhanced Report Format'   under Linked Documents section.     SPECIMEN(S):   Colon polyp, 30 cm     FINAL DIAGNOSIS:   Colon at 30 cm, mucosal biopsy:   - Hyperplastic polyp, with focal changes suggestive of sessile serrated   adenoma.     Electronically signed out by:     Eric Rivas M.D.  = per Surveillance Recommendations:  Repeat in 5 yrs.    Notes Recorded by Moris Thomas MD on 4/25/2014 at 1:36 PM  Adenomatous polyp colonoscopy 5 years         DDD (degenerative disc disease), cervical 12/17/2013     Priority: Medium     Health Care Home 09/03/2013     Priority: Medium     Status:  Accepted  Care Coordinator:  Alla Fernandez    See Letters for [facility] Care Plan  Date:  October 20, 2014         Rosacea 08/26/2013     Priority: Medium     COPD (chronic obstructive pulmonary disease) (H) 08/23/2012     Priority: Medium     Pulmonary Function test by Sylvester Erickson MD on 9/13/2013 3:38 PM   IDENTIFICATION: Bia Bill is a 47-year-old female with obesity and a history of tobacco use.   RESULTS:   1. Spirometry: FEV1 moderately reduced. FVC moderately reduced. FEV1/FVC ratio normal.   2. Bronchodilator Response: No significant change is seen with bronchodilators.   INTERPRETATION: Moderate reduction in FEV1 and FVC with well-preserved FEV1/FVC ratio. This can be seen in the setting of obstructive lung disease with air trapping or with restrictive lung disease. Clinical correlation is needed. Further testing with lung volumes and DLCO may be useful.   SYLVESTER ERICKSON MD          Moderate mixed bipolar I disorder (H) 10/13/2011     Priority: Medium     Problem list name updated by automated process. Provider to review       Personality disorder, depressive 10/13/2011     Priority: Medium     Erythrocytosis      Priority: Medium     Smoker 04/01/2011     Priority: Medium      3/2011  Not interested in quitting at this time.   Will consider and let us know how we can be of assistance.   Understands long term risks associated with same and increased risk of blood clot formation.        GILES (Obstructive Sleep Apnea)-Moderate (AHI 16) 03/01/2010     Priority: Medium     RLS (restless legs syndrome) 03/01/2010     Priority: Medium     Ferritin 11.       Fatty liver 08/28/2009     Priority: Medium     US on 8/26/2009        Severe episode of recurrent major depressive disorder (H) 11/03/2008     Priority: Medium     Hypothyroidism 05/13/2008     Priority: Medium     was hyperthyroid - Hashimoto's and graves and had iodine treatment done at [facility] in early 2008.    Now hypothyroid - is following at [facility], on 125mcg levothyroxine - see scanned documents       Esophageal reflux 04/29/2008     Priority: Medium     Developmental reading disorder 09/12/2007     Priority: Medium     Problem list name updated by automated process. Provider to review       Chondromalacia of patella 02/27/2007     Priority: Medium     SECONDARY POLYCYTHEMIA      Priority: Medium     Betheda HGB, a high-oxygen affinity hemoglobin which results in a normal compensatory erythrocytosis.  There is no specific therapy needed.  I did order a hemoglobin electrophoresis today to help confirm this in Bia, as she tells me she has never had this test performed. Dr. Riley 1/17/07       Alcohol abuse, in remission 08/01/2006     Priority: Medium     Quit [address] ordered, lost her children,        Mild persistent asthma 07/11/2006     Priority: Medium     Polyneuropathy in other diseases classified elsewhere (H) 07/11/2006     Priority: Medium     Has had chronic pain in the left groin and upper leg secondary to a blood clot in the thigh, treated with neurontin without benefit, still has chronic pain since 2005, sharp shooting intermittantly.    MRI/MRA 12/07 - [facility] - see scanned documents   - had mild nonspecific  white matter changes  Otherwise negative     MRI c- spine [facility] 12/07 - no cervical cord abn.  Small disc protrusion C6-C7, no impingement       DVT (deep venous thrombosis) (H) 07/11/2006     Priority: Medium     She had a DVT post pregnancy and delivery in 1992.   3/26/2004:Hospitalized at [facility] for extensive left lower leg DVT.  This occurred a month after pneumonia episode and decreased activity.  She had lytic therapy, tPA followed by Possis mechanical thrombectomy device and three stents in veins.  She did have Groshong catheter at the time.  There was a positive lupus anticoagulant, negative Factor V and cardiolipin clement.           Past Medical History:    Past Medical History:   Diagnosis Date     Acromioclavicular joint arthritis 1/25/2017     Acute bronchospasm 8/15/2016     Acute gouty arthritis 8/4/2014     Anxiety state, unspecified      Bipolar I disorder, most recent episode (or current) unspecified      Closed fracture of metatarsal bone 6/5/2007     Depressive disorder, not elsewhere classified      DVT, lower extremity (H)      Headache 10/17/2014     Impingement syndrome of shoulder region 1/25/2017     Polycythemia, secondary      Right shoulder pain 10/14/2014     Trochanteric bursitis of both hips 1/11/2016       Past Surgical History:    Past Surgical History:   Procedure Laterality Date     BONE MARROW BIOPSY, BONE SPECIMEN, NEEDLE/TROCAR N/A 11/17/2014    Procedure: BIOPSY BONE MARROW;  Surgeon: Hay Aaron MD;  Location: [facility]     D & C  10/26/09    with uterine ablation     ENDOVASCULAR PLACEMENT VASCULAR DEVICE Left     Left leg stent x 3     ESOPHAGOSCOPY, GASTROSCOPY, DUODENOSCOPY (EGD), COMBINED  4/21/2014    Procedure: Gastroscopy;  Surgeon: Moris Thomas MD;  Location: [facility]     HYSTERECTOMY, PAP NO LONGER INDICATED  1-4-2010     LAPAROSCOPIC CHOLECYSTECTOMY N/A 1/4/2019    Procedure: Laparoscopic cholecystectomy;  Surgeon: Aaron Siegel MD;  Location: [facility]      LITHOTRIPSY  2004    Lithotrypsy       Family History:    Family History   Problem Relation Age of Onset     Hypertension Mother      Diabetes Mother      Blood Disease Mother      Heart Disease Mother         chf     Cerebrovascular Disease Mother      Hypertension Father      Cancer Father         lung cancer     Allergies Sister      Diabetes Sister      Depression Sister      Hypertension Sister        Social History:  Marital Status:   [4]  Social History     Tobacco Use     Smoking status: Former Smoker     Packs/day: 0.50     Years: 34.00     Pack years: 17.00     Types: Cigarettes     Last attempt to quit: 2019     Years since quittin.0     Smokeless tobacco: Never Used     Tobacco comment: started at age 11.  Quit during pregnancyX4.  Quit 3 months ago.    Substance Use Topics     Alcohol use: No     Alcohol/week: 0.0 standard drinks     Comment: quit      Drug use: No     Comment: past hx 22 years ago/ uppers downers, canibus        Medications:    cephALEXin (KEFLEX) 500 MG capsule  acetaminophen (TYLENOL) 500 MG tablet  albuterol (PROAIR HFA/PROVENTIL HFA/VENTOLIN HFA) 108 (90 Base) MCG/ACT inhaler  albuterol (PROVENTIL) (2.5 MG/3ML) 0.083% neb solution  allopurinol (ZYLOPRIM) 300 MG tablet  ARIPiprazole (ABILIFY) 10 MG tablet  buPROPion (WELLBUTRIN XL) 150 MG 24 hr tablet  EPINEPHrine (EPIPEN/ADRENACLICK/OR ANY BX GENERIC EQUIV) 0.3 MG/0.3ML injection 2-pack  fluticasone-salmeterol (ADVAIR HFA) 230-21 MCG/ACT inhaler  furosemide (LASIX) 20 MG tablet  gabapentin (NEURONTIN) 300 MG capsule  gabapentin (NEURONTIN) 600 MG tablet  ipratropium - albuterol 0.5 mg/2.5 mg/3 mL (DUONEB) 0.5-2.5 (3) MG/3ML neb solution  lamoTRIgine (LAMICTAL) 100 MG tablet  levothyroxine (SYNTHROID/LEVOTHROID) 150 MCG tablet  levothyroxine (SYNTHROID/LEVOTHROID) 175 MCG tablet  mometasone-formoterol (DULERA) 200-5 MCG/ACT oral inhaler  omeprazole (PRILOSEC) 20 MG DR capsule  order for DME  order for  DME  order for DME  order for DME  order for DME  potassium chloride ER (K-DUR/KLOR-CON M) 10 MEQ CR tablet  pramipexole (MIRAPEX) 0.125 MG tablet  simvastatin (ZOCOR) 20 MG tablet  traZODone (DESYREL) 150 MG tablet  VITAMIN D3 1000 units tablet  warfarin ANTICOAGULANT (JANTOVEN ANTICOAGULANT) 5 MG tablet        Review of Systems  All other systems are reviewed and are negative.     Physical Exam   BP: 133/73  Heart Rate: 87  Temp: 98.2  F (36.8  C)  Resp: 18  Weight: 109.6 kg (241 lb 9 oz)  SpO2: 95 %      Physical Exam  Nursing note and vitals were reviewed.  Constitutional: Awake and alert, adequately nourished and developed appearing 54-year-old in no apparent discomfort, who does not appear acutely ill, and who answers questions appropriately and cooperates with examination.  HEENT: EOMI.   Neck: Freely mobile.  Cardiovascular: Cardiac examination reveals normal heart rate and regular rhythm without murmur.  Pulmonary/Chest: Breathing is unlabored.  Breath sounds are clear and equal bilaterally.  There no retractions, tachypnea, rales, wheezes, or rhonchi.  Abdomen: Soft, nontender, no HSM or masses rebound or guarding.  Musculoskeletal: Extremities are warm and well-perfused.  There is erythema, tenderness, warmth and a large patch on the medial aspect of the left thigh and also on the posterior aspect as documented in the photos below.  No involvement of the leg or foot.  Normal motion without pain of the hip knee and ankle.  No purulent drainage.  Neurological: Alert, oriented, thought content logical, coherent   Skin: Warm, dry, no rashes.  Psychiatric: Affect broad and appropriate.              ED Course        Procedures               Critical Care time:  none               No results found. However, due to the size of the patient record, not all encounters were searched. Please check Results Review for a complete set of results.    Medications   ceFAZolin (ANCEF) intermittent infusion 2 g in 100 mL  dextrose PRE-MIX (has no administration in time range)       14:05 PM Patient assessed.     Assessments & Plan (with Medical Decision Making)     54-year-old female with multiple medical problems including lymphedema and cellulitis presented with symptoms of cellulitis with no pain, redness, swelling in the medial aspect of the left thigh and also on the posterior aspect.  This appears to be uncomplicated cellulitis.  She is not having significant symptoms of systemic illness.  At this time there is no indication for laboratory testing.  We will initiate antibiotic therapy with Ancef 2 g IV in the emergency department followed by cephalexin 500 mg 4 times daily for 10 days.  She is advised to rest and elevate her leg is much as possible over the next 48 hours.  She has a lymphedema clinic appointment on Friday, where this was initially identified, and she will follow-up there and they will reassess her response to therapy.  If she has significant worsening tomorrow with systemic symptoms of illness or very significant spread for, she should return to the emergency department.  If she has minor spread of the redness she does not need to return.    I have reviewed the nursing notes.    I have reviewed the findings, diagnosis, plan and need for follow up with the patient.       New Prescriptions    CEPHALEXIN (KEFLEX) 500 MG CAPSULE    Take 1 capsule (500 mg) by mouth 4 times daily for 10 days       Final diagnoses:   Cellulitis of left lower extremity     This document serves as a record of the services and decisions personally performed and made by Raulito Flores MD. It was created on his behalf by Faith Pierre, a trained medical scribe. The creation of this document is based the provider's statements to the medical scribe.  Faith Pierre 2:15 PM 12/4/2019    Provider:   The information in this document, created by the medical scribe for me, accurately reflects the services I personally performed and  the decisions made by me. I have reviewed and approved this document for accuracy prior to leaving the patient care area.  Raulito Flores MD 2:15 PM 12/4/2019 12/4/2019   [facility] EMERGENCY DEPARTMENT     Raulito Flores MD  12/04/19 [id number]

## 2019-12-04 NOTE — ED NOTES
Left leg erythema and pain/burning that started yesterday.  Patient has history of cellulitis.   Patient denies fevers.  Tylenol 2 tabs at 0500 with no improvement.

## 2019-12-04 NOTE — DISCHARGE INSTRUCTIONS
Take cephalexin 500 mg 4 times per day.  Rest and elevate your leg is much as possible for the next 48 hours.  Return if you are developing fevers or significant worsening.  If the redness is a little more tomorrow you do not need reevaluation.  Keep your appointment on Friday in the lymphedema clinic and they will recheck the infection.  Do not need to go into the primary care clinic.

## 2019-12-05 ENCOUNTER — PATIENT OUTREACH (OUTPATIENT)
Dept: CARE COORDINATION | Facility: CLINIC | Age: 54
End: 2019-12-05

## 2019-12-05 ENCOUNTER — TELEPHONE (OUTPATIENT)
Dept: FAMILY MEDICINE | Facility: CLINIC | Age: 54
End: 2019-12-05

## 2019-12-05 NOTE — TELEPHONE ENCOUNTER
RN CC informed patient of Dr. Leong's recommendations below.  Patient agreeable to begin weighing herself daily and will write her weights down in a notebook.  She will see Dr. Leong as scheduled 12/11/19 and call for new or worsening symptoms.    Melissa Behl BSN, RN, PHN, [facility]  Primary Care Clinical RN Care Coordinator     [phone]

## 2019-12-05 NOTE — TELEPHONE ENCOUNTER
Writer covering for Lead RN JEANMARIE Whitaker    S-(situation):   1. Patient clarifying Lasix dosing  2. Patient reports no change in breathing    B-(background):   1. Lasix was increased per 11/20/19 office visit to 3 tabs in am and 2 tabs at lunch  2. Patient was seen for COPD follow up 11/20/19    A-(assessment):   1. Patient states she thought she was only to follow increased Lasix dosing for 1 week and then revert back to her previous dose of 2 tablets 2 times daily.  Patient states her edema improved with the dosage increase, however, worsened when she went back to 2 tabs 2 times daily.  Patient is reluctant to weigh herself daily.    2. Patient reports no change in her breathing since last office visit 11/20/19.  Patient is using her albuterol inhaler 4 times daily for shortness of breath, is short of breath after 5 minutes of activity and is reporting a constant productive cough with thick yellow/green phlegm that wakes her up at night.  She denies fever and chills.    Patient has a future appointment with PCP 12/11/19.    R-(recommendations/plan):   1. Please advise if patient was to continue on Lasix 3 tablets every AM and 2 tablets at noon or revert back to 2 tablets 2 times daily after 1 week of increased dose.    2. Please advise of any further recommendations for patient's cough/breathing prior to 12/11/19 office visit.    Melissa Behl BSN, RN, PHN, CCM  Primary Care Clinical RN Care Coordinator  [facility]   [phone]

## 2019-12-05 NOTE — PROGRESS NOTES
Clinic Care Coordination Contact    Follow Up Progress Note      Assessment: Patient was at [facility] ED 12/4/19 for cellulitis of left extremity.  Patient was administered IV antibiotics and discharged on a 10 day course of cephalexin.  RN CC reviewed how to take medication and possible side effects.  RN CC reviewed importance of rest and elevation of affected extremity.  Patient verbalized concern that she would not be able to comply with these instructions due to needing to cook and clean.  RN CC reinforced the importance of these instructions for the first 48 hours.  Patient states she will do her best to comply.    Patient reports no improvement in her breathing since seen by Dr. Leong 11/20/19.  Patient states her edema improved for 1 week while on increased dose of Lasix, but states the edema returned when she resumed her previous dose.  Patient states she thought she was only to increase the dose for 1 week.  Writer will clarify with PCP.  Patient has a future appointment with PCP 12/11/19.  Patient was unaware of COPD or Asthma Action Plan.  Asthma Action Plan noted on file, will mail to patient.      Clinic Care Coordination COPD Follow up Assessment  Symptom Review:      Are you having any increased shortness of breath? No    When you cough are you coughing up anything? Yes  - Color yellow/green  - Consistency thick  - Frequency constantly, wakes her up at night    What COPD Zone currently in:Yellow (RN CC educated patient on COPD Zones)    What number would you call if you were in the YELLOW zones: [phone]     Medications:     Were you started on any new medications since the last time we talked?  Yes, new antibiotic for cellulitis Keflex 4 times daily    Do you have all of your medications? Yes    Have you had trouble filling your prescriptions? No    Are you medications effective in controlling your symptoms? Yes, states the albuterol inhaler relieves her shortness of breath    Are you  still on Prednisone (* does pt understand the tapering instructions)?  no    For patients with DM:       Are you monitoring your blood sugars, if so how are your blood sugars? n/a    How often have you had to use your rescue medications? 4 times daily    Activity:    How much activity can you do before you are SOB? 5 minutes    Have you had to reduce your activities because of dyspnea or other symptoms? No, patient states it has stayed the same    Diet:    Do you weigh yourself daily? No      Has there been a recent change in your weight? No          Goals addressed this encounter:   Goals Addressed                 This Visit's Progress      #1 Medical (pt-stated)        Goal Statement: I want my cellulitis to heal.  Date goal set: 12/5/2019   Measure of Success: Cellulitis will be healed.  Barriers: current infection  Strengths: care coordination, lymphedema therapy  Date to Achieve By: 1/5/20  Patient expressed understanding of goal: yes    Action steps to achieve this goal  1. I will take and finish my antibiotics as prescribed  2. I will elevate my legs as much as possible for the first 48 hours  3. I will follow up with PCP as scheduled 12/11/19         #2 Medical (pt-stated)   On track     Goal Statement: I want my shortness of breath to improve    Date goal set: 11/1/19    Measure of Success: When I am not short of breath    Barriers: COPD    Strengths: Motivated    Date to Achieve By: February 2020    Patient expressed understanding of goal: Yes    Action steps to achieve this goal  1. I will get my nebulizer fixed  2. I will use nebulizer as instructed  3. I will see PCP 12/11/19 as scheduled  4. I will use my asthma action plan         #3 Improve chronic symptoms (pt-stated)        Goal Statement: I want my edema to improve.  Date goal set: 12/5/2019   Measure of Success: I will be able to put support stockings on by myself.  Barriers: lymphedema  Strengths: care coordination, lymphedema clinic  Date to  Achieve By: 3/1/20  Patient expressed understanding of goal: yes    Action steps to achieve this goal  1. I will elevate my legs throughout the day  2. I will attend lymphedema clinic appointments  3. I will take my diuretics as prescribed              Intervention/Education provided during outreach: RN CC educated patient on antibiotic, importance of completions, importance of elevation and rest of LLE for the first 48 hours while recovering from cellulitis, Asthma and COPD Action Plans.     Outreach Frequency: monthly    Plan:   1. Patient will take and complete antibiotics as prescribed.  2. Patient will rest and elevate her LLE as much as possible for the first 48 hours.  3. Patient will follow up with lymphedema clinic and PCP as scheduled.  4. RN CC will notify PCP of patient reverting back to previous lasix dosing and clarify for patient.  Will update on no change in breathing symptoms following antibiotics and prednisone.  See 12/5/19 telephone encounter  5. RN CC will hand of to lead RN Care Coordinator to follow up in 2-3 weeks.    Melissa Behl BSN, RN, PHN, CCM  Primary Care Clinical RN Care Coordinator  [facility]   [phone]

## 2019-12-05 NOTE — PROGRESS NOTES
Clinic Care Coordination Contact  Care Team Conversations    Per 12/5/19 telephone encounter, patient is to be on Lasix 2 tablets 2 times daily per Dr. Leong.  Patient to take an extra 20-40 mg of Lasix for a weight gain of 3 lbs.    RN CC informed patient of Dr. Leong's recommendations.  Patient agreeable to begin weighing herself daily and will write her weights down in a notebook.  She will see Dr. Leong as scheduled 12/11/19 and call for new or worsening symptoms.    Goals updated.    Melissa Behl BSN, RN, PHN, CCM  Primary Care Clinical RN Care Coordinator  [facility]   [phone]

## 2019-12-05 NOTE — PROGRESS NOTES
Clinic Care Coordination Contact  [facility]/Voicemail       Clinical Data: Care Coordinator Outreach  Outreach attempted x 1.  Left message on patient's voicemail with call back information and requested return call.  Plan: Care Coordinator will try to reach patient again in 1-2 business days.    Melissa Behl BSN, RN, PHN, [facility]  Primary Care Clinical RN Care Coordinator  [facility]   [phone]

## 2019-12-05 NOTE — TELEPHONE ENCOUNTER
Yep 2 and 2.  howevery she should have stanfding order for another 20-40 mg lasix q am for 3 lb weight gain.

## 2019-12-06 ENCOUNTER — HOSPITAL ENCOUNTER (OUTPATIENT)
Dept: PHYSICAL THERAPY | Facility: CLINIC | Age: 54
Setting detail: THERAPIES SERIES
End: 2019-12-06
Attending: FAMILY MEDICINE
Payer: COMMERCIAL

## 2019-12-06 PROCEDURE — 97140 MANUAL THERAPY 1/> REGIONS: CPT | Mod: GP | Performed by: REHABILITATION PRACTITIONER

## 2019-12-09 ENCOUNTER — TELEPHONE (OUTPATIENT)
Dept: FAMILY MEDICINE | Facility: CLINIC | Age: 54
End: 2019-12-09

## 2019-12-09 ENCOUNTER — OFFICE VISIT (OUTPATIENT)
Dept: SLEEP MEDICINE | Facility: CLINIC | Age: 54
End: 2019-12-09
Attending: FAMILY MEDICINE
Payer: COMMERCIAL

## 2019-12-09 VITALS — HEIGHT: 58 IN | OXYGEN SATURATION: 96 % | WEIGHT: 242.2 LBS | BODY MASS INDEX: 50.84 KG/M2 | HEART RATE: 90 BPM

## 2019-12-09 DIAGNOSIS — J44.1 COPD EXACERBATION (H): ICD-10-CM

## 2019-12-09 DIAGNOSIS — G47.33 OSA (OBSTRUCTIVE SLEEP APNEA): Primary | ICD-10-CM

## 2019-12-09 DIAGNOSIS — J96.11 CHRONIC RESPIRATORY FAILURE WITH HYPOXIA AND HYPERCAPNIA (H): ICD-10-CM

## 2019-12-09 DIAGNOSIS — E66.2 HYPOVENTILATION ASSOCIATED WITH OBESITY (H): ICD-10-CM

## 2019-12-09 DIAGNOSIS — I89.0 LYMPHEDEMA OF BOTH LOWER EXTREMITIES: ICD-10-CM

## 2019-12-09 DIAGNOSIS — J96.12 CHRONIC RESPIRATORY FAILURE WITH HYPOXIA AND HYPERCAPNIA (H): ICD-10-CM

## 2019-12-09 PROCEDURE — 99214 OFFICE O/P EST MOD 30 MIN: CPT | Performed by: FAMILY MEDICINE

## 2019-12-09 RX ORDER — FUROSEMIDE 20 MG
TABLET ORAL
Qty: 360 TABLET | Refills: 3 | Status: ON HOLD | OUTPATIENT
Start: 2019-12-09 | End: 2020-01-23

## 2019-12-09 ASSESSMENT — MIFFLIN-ST. JEOR: SCORE: 1593.36

## 2019-12-09 NOTE — PATIENT INSTRUCTIONS
Your BMI is Body mass index is 50.62 kg/m .  Weight management is a personal decision.  If you are interested in exploring weight loss strategies, the following discussion covers the approaches that may be successful. Body mass index (BMI) is one way to tell whether you are at a healthy weight, overweight, or obese. It measures your weight in relation to your height.  A BMI of 18.5 to 24.9 is in the healthy range. A person with a BMI of 25 to 29.9 is considered overweight, and someone with a BMI of 30 or greater is considered obese. More than two-thirds of American adults are considered overweight or obese.  Being overweight or obese increases the risk for further weight gain. Excess weight may lead to heart disease and diabetes.  Creating and following plans for healthy eating and physical activity may help you improve your health.  Weight control is part of healthy lifestyle and includes exercise, emotional health, and healthy eating habits. Careful eating habits lifelong are the mainstay of weight control. Though there are significant health benefits from weight loss, long-term weight loss with diet alone may be very difficult to achieve- studies show long-term success with dietary management in less than 10% of people. Attaining a healthy weight may be especially difficult to achieve in those with severe obesity. In some cases, medications, devices and surgical management might be considered.  What can you do?  If you are overweight or obese and are interested in methods for weight loss, you should discuss this with your provider.     Consider reducing daily calorie intake by 500 calories.     Keep a food journal.     Avoiding skipping meals, consider cutting portions instead.    Diet combined with exercise helps maintain muscle while optimizing fat loss. Strength training is particularly important for building and maintaining muscle mass. Exercise helps reduce stress, increase energy, and improves fitness.  Increasing exercise without diet control, however, may not burn enough calories to loose weight.       Start walking three days a week 10-20 minutes at a time    Work towards walking thirty minutes five days a week     Eventually, increase the speed of your walking for 1-2 minutes at time    In addition, we recommend that you review healthy lifestyles and methods for weight loss available through the National Institutes of Health patient information sites:  http://win.niddk.nih.gov/publications/index.htm    And look into health and wellness programs that may be available through your health insurance provider, employer, local community center, or [employer]borah [employer].    Weight management plan: Patient was referred to their PCP to discuss a diet and exercise plan.

## 2019-12-09 NOTE — PROGRESS NOTES
"Review of Systems:  A complete review of systems reviewed by me is negative with the exeption of what has been mentioned in the history of present illness.  DERMATOLOGIC: NEGATIVE for rashes, new moles or change in mole(s)  ENDOCRINE: NEGATIVE for increased thirst or urination, diabetes.  ENDOCRINE:  POSITIVE for  increased thirst and increased urination  LYMPHATIC: NEGATIVE for swollen lymph nodes, lumps or bumps in the breasts or nipple discharge.  CONSTITUTIONAL: NEGATIVE  fever, chills, sweats or night sweats. POSITIVE for obvious weight gain or loss, drug allergies.  EYES: NEGATIVE for  blind spots, double vision.\"POSITIVE for vision changes,\"ENT: NEGATIVE for ear pain,  post-nasal drip, runny nose, bloody nose\" POSITIVE for sore throat, sinus pain,\"CARDIAC: POSITIVE for fast heartbeats or fluttering in chest, chest pain or pressure, breathlessness when lying flat, swollen legs or swollen feet.\"NEGATIVE for heart disease,\"NEUROLOGIC: NEGATIVE headaches, weakness or numbness in the arms or legs.  PULMONARY:POSITIVE SOB at rest, SOB with activity, dry cough,  wheezing or whistling when breathing.  \"NEGATIVE for coughing up phlegm, coughing up blood,\"GASTROINTESTINAL: NEGATIVE for nausea or vomitting, loose or watery stools, fat or grease in stools, constipation, abdominal pain, bowel movements black in color or blood noted.  GENITOURINARY: NEGATIVE for pain during urination, blood in urine,  irregular menstrual periods.\"POSITIVE for urinating more than normal,\"MUSCULOSKELETAL: NEGATIVE for muscle pain, bone or joint pain, swollen joints.    POSITIVE for Depression, Anxiety, Other mental issues  "

## 2019-12-09 NOTE — NURSING NOTE
"Chief Complaint   Patient presents with     Sleep Problem     COPD exacerbation referred by PCP       Initial LMP 09/27/2009  Estimated body mass index is 50.49 kg/m  as calculated from the following:    Height as of 11/8/19: 1.473 m (4' 10\").    Weight as of 12/4/19: 109.6 kg (241 lb 9 oz).    Medication Reconciliation: complete      DME: Jose Ramon    "

## 2019-12-10 ENCOUNTER — HOSPITAL ENCOUNTER (OUTPATIENT)
Facility: CLINIC | Age: 54
Setting detail: OBSERVATION
Discharge: HOME HEALTH | End: 2019-12-12
Attending: EMERGENCY MEDICINE | Admitting: INTERNAL MEDICINE
Payer: COMMERCIAL

## 2019-12-10 ENCOUNTER — HOSPITAL ENCOUNTER (OUTPATIENT)
Dept: PHYSICAL THERAPY | Facility: CLINIC | Age: 54
Setting detail: THERAPIES SERIES
End: 2019-12-10
Attending: FAMILY MEDICINE
Payer: COMMERCIAL

## 2019-12-10 ENCOUNTER — APPOINTMENT (OUTPATIENT)
Dept: ULTRASOUND IMAGING | Facility: CLINIC | Age: 54
End: 2019-12-10
Attending: FAMILY MEDICINE
Payer: COMMERCIAL

## 2019-12-10 DIAGNOSIS — I73.9 PVD (PERIPHERAL VASCULAR DISEASE) WITH CLAUDICATION (H): ICD-10-CM

## 2019-12-10 DIAGNOSIS — R21 RASH: Primary | ICD-10-CM

## 2019-12-10 DIAGNOSIS — Z79.01 LONG TERM (CURRENT) USE OF ANTICOAGULANTS: ICD-10-CM

## 2019-12-10 DIAGNOSIS — Z86.73 HISTORY OF STROKE: ICD-10-CM

## 2019-12-10 DIAGNOSIS — J40 BRONCHITIS: ICD-10-CM

## 2019-12-10 DIAGNOSIS — R68.83 CHILLS: ICD-10-CM

## 2019-12-10 DIAGNOSIS — L08.9 SKIN INFECTION: ICD-10-CM

## 2019-12-10 DIAGNOSIS — F33.1 MAJOR DEPRESSIVE DISORDER, RECURRENT EPISODE, MODERATE (H): ICD-10-CM

## 2019-12-10 DIAGNOSIS — I82.409 DVT (DEEP VENOUS THROMBOSIS) (H): ICD-10-CM

## 2019-12-10 DIAGNOSIS — Z86.718 PERSONAL HISTORY OF VENOUS THROMBOSIS AND EMBOLISM: ICD-10-CM

## 2019-12-10 DIAGNOSIS — Z79.01 LONG TERM CURRENT USE OF ANTICOAGULANT THERAPY: ICD-10-CM

## 2019-12-10 DIAGNOSIS — I89.0 LYMPHEDEMA: ICD-10-CM

## 2019-12-10 DIAGNOSIS — R60.0 BILATERAL LOWER EXTREMITY EDEMA: ICD-10-CM

## 2019-12-10 DIAGNOSIS — I89.0 LYMPHEDEMA OF LOWER EXTREMITY: ICD-10-CM

## 2019-12-10 LAB
ALBUMIN SERPL-MCNC: 3.1 G/DL (ref 3.4–5)
ALP SERPL-CCNC: 160 U/L (ref 40–150)
ALT SERPL W P-5'-P-CCNC: 27 U/L (ref 0–50)
ANION GAP SERPL CALCULATED.3IONS-SCNC: 2 MMOL/L (ref 3–14)
AST SERPL W P-5'-P-CCNC: 19 U/L (ref 0–45)
BASOPHILS # BLD AUTO: 0 10E9/L (ref 0–0.2)
BASOPHILS NFR BLD AUTO: 0.4 %
BILIRUB SERPL-MCNC: 0.4 MG/DL (ref 0.2–1.3)
BUN SERPL-MCNC: 9 MG/DL (ref 7–30)
CALCIUM SERPL-MCNC: 8.7 MG/DL (ref 8.5–10.1)
CHLORIDE SERPL-SCNC: 100 MMOL/L (ref 94–109)
CO2 SERPL-SCNC: 37 MMOL/L (ref 20–32)
CREAT SERPL-MCNC: 0.72 MG/DL (ref 0.52–1.04)
CRP SERPL-MCNC: 23.8 MG/L (ref 0–8)
DIFFERENTIAL METHOD BLD: ABNORMAL
EOSINOPHIL # BLD AUTO: 0.1 10E9/L (ref 0–0.7)
EOSINOPHIL NFR BLD AUTO: 1 %
ERYTHROCYTE [DISTWIDTH] IN BLOOD BY AUTOMATED COUNT: 20.9 % (ref 10–15)
ERYTHROCYTE [SEDIMENTATION RATE] IN BLOOD BY WESTERGREN METHOD: 7 MM/H (ref 0–30)
GFR SERPL CREATININE-BSD FRML MDRD: >90 ML/MIN/{1.73_M2}
GLUCOSE BLDC GLUCOMTR-MCNC: 167 MG/DL (ref 70–99)
GLUCOSE BLDC GLUCOMTR-MCNC: 173 MG/DL (ref 70–99)
GLUCOSE SERPL-MCNC: 172 MG/DL (ref 70–99)
HCT VFR BLD AUTO: 47.8 % (ref 35–47)
HCT VFR BLD AUTO: 48.4 % (ref 35–47)
HGB BLD-MCNC: 14 G/DL (ref 11.7–15.7)
IMM GRANULOCYTES # BLD: 0 10E9/L (ref 0–0.4)
IMM GRANULOCYTES NFR BLD: 0.2 %
INR PPP: 4.82 (ref 0.86–1.14)
LYMPHOCYTES # BLD AUTO: 1.3 10E9/L (ref 0.8–5.3)
LYMPHOCYTES NFR BLD AUTO: 26.3 %
MCH RBC QN AUTO: 25.1 PG (ref 26.5–33)
MCHC RBC AUTO-ENTMCNC: 28.9 G/DL (ref 31.5–36.5)
MCV RBC AUTO: 87 FL (ref 78–100)
MONOCYTES # BLD AUTO: 0.3 10E9/L (ref 0–1.3)
MONOCYTES NFR BLD AUTO: 6.7 %
NEUTROPHILS # BLD AUTO: 3.1 10E9/L (ref 1.6–8.3)
NEUTROPHILS NFR BLD AUTO: 65.4 %
NRBC # BLD AUTO: 0 10*3/UL
NRBC BLD AUTO-RTO: 0 /100
PLATELET # BLD AUTO: 168 10E9/L (ref 150–450)
POTASSIUM SERPL-SCNC: 2.9 MMOL/L (ref 3.4–5.3)
POTASSIUM SERPL-SCNC: 3.3 MMOL/L (ref 3.4–5.3)
PROT SERPL-MCNC: 6.7 G/DL (ref 6.8–8.8)
RBC # BLD AUTO: 5.58 10E12/L (ref 3.8–5.2)
SODIUM SERPL-SCNC: 139 MMOL/L (ref 133–144)
WBC # BLD AUTO: 4.8 10E9/L (ref 4–11)

## 2019-12-10 PROCEDURE — 99207 ZZC APP CREDIT; MD BILLING SHARED VISIT: CPT | Performed by: PHYSICIAN ASSISTANT

## 2019-12-10 PROCEDURE — 80053 COMPREHEN METABOLIC PANEL: CPT | Performed by: NURSE PRACTITIONER

## 2019-12-10 PROCEDURE — 36415 COLL VENOUS BLD VENIPUNCTURE: CPT | Performed by: INTERNAL MEDICINE

## 2019-12-10 PROCEDURE — 99285 EMERGENCY DEPT VISIT HI MDM: CPT | Mod: 25 | Performed by: NURSE PRACTITIONER

## 2019-12-10 PROCEDURE — 25000125 ZZHC RX 250: Performed by: PHYSICIAN ASSISTANT

## 2019-12-10 PROCEDURE — 25800030 ZZH RX IP 258 OP 636: Performed by: NURSE PRACTITIONER

## 2019-12-10 PROCEDURE — 25000132 ZZH RX MED GY IP 250 OP 250 PS 637: Performed by: INTERNAL MEDICINE

## 2019-12-10 PROCEDURE — G0378 HOSPITAL OBSERVATION PER HR: HCPCS

## 2019-12-10 PROCEDURE — 25000132 ZZH RX MED GY IP 250 OP 250 PS 637: Performed by: PHYSICIAN ASSISTANT

## 2019-12-10 PROCEDURE — 96375 TX/PRO/DX INJ NEW DRUG ADDON: CPT

## 2019-12-10 PROCEDURE — 85025 COMPLETE CBC W/AUTO DIFF WBC: CPT | Performed by: NURSE PRACTITIONER

## 2019-12-10 PROCEDURE — 93970 EXTREMITY STUDY: CPT

## 2019-12-10 PROCEDURE — 99219 ZZC INITIAL OBSERVATION CARE,LEVL II: CPT | Performed by: INTERNAL MEDICINE

## 2019-12-10 PROCEDURE — 25000131 ZZH RX MED GY IP 250 OP 636 PS 637: Performed by: PHYSICIAN ASSISTANT

## 2019-12-10 PROCEDURE — 85652 RBC SED RATE AUTOMATED: CPT | Performed by: NURSE PRACTITIONER

## 2019-12-10 PROCEDURE — 85610 PROTHROMBIN TIME: CPT | Performed by: FAMILY MEDICINE

## 2019-12-10 PROCEDURE — 87040 BLOOD CULTURE FOR BACTERIA: CPT | Performed by: NURSE PRACTITIONER

## 2019-12-10 PROCEDURE — 25000132 ZZH RX MED GY IP 250 OP 250 PS 637: Performed by: NURSE PRACTITIONER

## 2019-12-10 PROCEDURE — 25000128 H RX IP 250 OP 636: Performed by: NURSE PRACTITIONER

## 2019-12-10 PROCEDURE — 00000146 ZZHCL STATISTIC GLUCOSE BY METER IP

## 2019-12-10 PROCEDURE — 96365 THER/PROPH/DIAG IV INF INIT: CPT | Performed by: NURSE PRACTITIONER

## 2019-12-10 PROCEDURE — 84132 ASSAY OF SERUM POTASSIUM: CPT | Performed by: INTERNAL MEDICINE

## 2019-12-10 PROCEDURE — 97140 MANUAL THERAPY 1/> REGIONS: CPT | Mod: GP | Performed by: REHABILITATION PRACTITIONER

## 2019-12-10 PROCEDURE — 86140 C-REACTIVE PROTEIN: CPT | Performed by: NURSE PRACTITIONER

## 2019-12-10 PROCEDURE — 96372 THER/PROPH/DIAG INJ SC/IM: CPT | Mod: XS

## 2019-12-10 PROCEDURE — 36415 COLL VENOUS BLD VENIPUNCTURE: CPT | Performed by: FAMILY MEDICINE

## 2019-12-10 PROCEDURE — 85014 HEMATOCRIT: CPT | Performed by: FAMILY MEDICINE

## 2019-12-10 PROCEDURE — 99285 EMERGENCY DEPT VISIT HI MDM: CPT | Mod: Z6 | Performed by: NURSE PRACTITIONER

## 2019-12-10 RX ORDER — DEXTROSE MONOHYDRATE 25 G/50ML
25-50 INJECTION, SOLUTION INTRAVENOUS
Status: DISCONTINUED | OUTPATIENT
Start: 2019-12-10 | End: 2019-12-12 | Stop reason: HOSPADM

## 2019-12-10 RX ORDER — POTASSIUM CHLORIDE 750 MG/1
10 TABLET, EXTENDED RELEASE ORAL DAILY
Status: DISCONTINUED | OUTPATIENT
Start: 2019-12-11 | End: 2019-12-12 | Stop reason: HOSPADM

## 2019-12-10 RX ORDER — POTASSIUM CHLORIDE 1500 MG/1
20-40 TABLET, EXTENDED RELEASE ORAL
Status: DISCONTINUED | OUTPATIENT
Start: 2019-12-10 | End: 2019-12-12 | Stop reason: HOSPADM

## 2019-12-10 RX ORDER — POTASSIUM CHLORIDE 7.45 MG/ML
10 INJECTION INTRAVENOUS
Status: DISCONTINUED | OUTPATIENT
Start: 2019-12-10 | End: 2019-12-12 | Stop reason: HOSPADM

## 2019-12-10 RX ORDER — LAMOTRIGINE 100 MG/1
100 TABLET ORAL DAILY
Status: DISCONTINUED | OUTPATIENT
Start: 2019-12-11 | End: 2019-12-12 | Stop reason: HOSPADM

## 2019-12-10 RX ORDER — FUROSEMIDE 40 MG
40-60 TABLET ORAL
Status: DISCONTINUED | OUTPATIENT
Start: 2019-12-10 | End: 2019-12-10 | Stop reason: CLARIF

## 2019-12-10 RX ORDER — IPRATROPIUM BROMIDE AND ALBUTEROL SULFATE 2.5; .5 MG/3ML; MG/3ML
1 SOLUTION RESPIRATORY (INHALATION) EVERY 6 HOURS PRN
Status: DISCONTINUED | OUTPATIENT
Start: 2019-12-10 | End: 2019-12-12 | Stop reason: HOSPADM

## 2019-12-10 RX ORDER — POTASSIUM CHLORIDE 29.8 MG/ML
20 INJECTION INTRAVENOUS
Status: DISCONTINUED | OUTPATIENT
Start: 2019-12-10 | End: 2019-12-10 | Stop reason: CLARIF

## 2019-12-10 RX ORDER — POTASSIUM CHLORIDE 1.5 G/1.58G
20-40 POWDER, FOR SOLUTION ORAL
Status: DISCONTINUED | OUTPATIENT
Start: 2019-12-10 | End: 2019-12-12 | Stop reason: HOSPADM

## 2019-12-10 RX ORDER — FUROSEMIDE 40 MG
40 TABLET ORAL EVERY EVENING
Status: DISCONTINUED | OUTPATIENT
Start: 2019-12-10 | End: 2019-12-12 | Stop reason: HOSPADM

## 2019-12-10 RX ORDER — FLUTICASONE PROPIONATE AND SALMETEROL XINAFOATE 230; 21 UG/1; UG/1
2 AEROSOL, METERED RESPIRATORY (INHALATION) 2 TIMES DAILY
Status: DISCONTINUED | OUTPATIENT
Start: 2019-12-10 | End: 2019-12-10 | Stop reason: CLARIF

## 2019-12-10 RX ORDER — ONDANSETRON 2 MG/ML
4 INJECTION INTRAMUSCULAR; INTRAVENOUS EVERY 6 HOURS PRN
Status: DISCONTINUED | OUTPATIENT
Start: 2019-12-10 | End: 2019-12-12 | Stop reason: HOSPADM

## 2019-12-10 RX ORDER — ALBUTEROL SULFATE 0.83 MG/ML
2.5 SOLUTION RESPIRATORY (INHALATION) EVERY 6 HOURS PRN
Status: DISCONTINUED | OUTPATIENT
Start: 2019-12-10 | End: 2019-12-12 | Stop reason: HOSPADM

## 2019-12-10 RX ORDER — CLINDAMYCIN PHOSPHATE 900 MG/50ML
900 INJECTION, SOLUTION INTRAVENOUS EVERY 8 HOURS
Status: DISCONTINUED | OUTPATIENT
Start: 2019-12-10 | End: 2019-12-12 | Stop reason: HOSPADM

## 2019-12-10 RX ORDER — ALLOPURINOL 300 MG/1
300 TABLET ORAL DAILY
Status: DISCONTINUED | OUTPATIENT
Start: 2019-12-11 | End: 2019-12-12 | Stop reason: HOSPADM

## 2019-12-10 RX ORDER — PROCHLORPERAZINE 25 MG
25 SUPPOSITORY, RECTAL RECTAL EVERY 12 HOURS PRN
Status: DISCONTINUED | OUTPATIENT
Start: 2019-12-10 | End: 2019-12-12 | Stop reason: HOSPADM

## 2019-12-10 RX ORDER — ACETAMINOPHEN 325 MG/1
650 TABLET ORAL EVERY 4 HOURS PRN
Status: DISCONTINUED | OUTPATIENT
Start: 2019-12-10 | End: 2019-12-12 | Stop reason: HOSPADM

## 2019-12-10 RX ORDER — GABAPENTIN 300 MG/1
300 CAPSULE ORAL AT BEDTIME
Status: DISCONTINUED | OUTPATIENT
Start: 2019-12-10 | End: 2019-12-12 | Stop reason: HOSPADM

## 2019-12-10 RX ORDER — CEFAZOLIN SODIUM 2 G/100ML
2 INJECTION, SOLUTION INTRAVENOUS ONCE
Status: COMPLETED | OUTPATIENT
Start: 2019-12-10 | End: 2019-12-10

## 2019-12-10 RX ORDER — GABAPENTIN 600 MG/1
600 TABLET ORAL 3 TIMES DAILY
Status: DISCONTINUED | OUTPATIENT
Start: 2019-12-10 | End: 2019-12-12 | Stop reason: HOSPADM

## 2019-12-10 RX ORDER — SIMVASTATIN 20 MG
20 TABLET ORAL AT BEDTIME
Status: DISCONTINUED | OUTPATIENT
Start: 2019-12-10 | End: 2019-12-12 | Stop reason: HOSPADM

## 2019-12-10 RX ORDER — ACETAMINOPHEN 650 MG/1
650 SUPPOSITORY RECTAL EVERY 4 HOURS PRN
Status: DISCONTINUED | OUTPATIENT
Start: 2019-12-10 | End: 2019-12-12 | Stop reason: HOSPADM

## 2019-12-10 RX ORDER — ARIPIPRAZOLE 10 MG/1
10 TABLET ORAL EVERY EVENING
Status: DISCONTINUED | OUTPATIENT
Start: 2019-12-10 | End: 2019-12-12 | Stop reason: HOSPADM

## 2019-12-10 RX ORDER — NALOXONE HYDROCHLORIDE 0.4 MG/ML
.1-.4 INJECTION, SOLUTION INTRAMUSCULAR; INTRAVENOUS; SUBCUTANEOUS
Status: DISCONTINUED | OUTPATIENT
Start: 2019-12-10 | End: 2019-12-12 | Stop reason: HOSPADM

## 2019-12-10 RX ORDER — ACETAMINOPHEN 500 MG
500-1000 TABLET ORAL EVERY 6 HOURS PRN
Status: DISCONTINUED | OUTPATIENT
Start: 2019-12-10 | End: 2019-12-10 | Stop reason: DRUGHIGH

## 2019-12-10 RX ORDER — ONDANSETRON 4 MG/1
4 TABLET, ORALLY DISINTEGRATING ORAL EVERY 6 HOURS PRN
Status: DISCONTINUED | OUTPATIENT
Start: 2019-12-10 | End: 2019-12-12 | Stop reason: HOSPADM

## 2019-12-10 RX ORDER — BUPROPION HYDROCHLORIDE 150 MG/1
150 TABLET ORAL EVERY EVENING
Status: DISCONTINUED | OUTPATIENT
Start: 2019-12-10 | End: 2019-12-12 | Stop reason: HOSPADM

## 2019-12-10 RX ORDER — PROCHLORPERAZINE MALEATE 5 MG
10 TABLET ORAL EVERY 6 HOURS PRN
Status: DISCONTINUED | OUTPATIENT
Start: 2019-12-10 | End: 2019-12-12 | Stop reason: HOSPADM

## 2019-12-10 RX ORDER — LIDOCAINE 40 MG/G
CREAM TOPICAL
Status: DISCONTINUED | OUTPATIENT
Start: 2019-12-10 | End: 2019-12-12 | Stop reason: HOSPADM

## 2019-12-10 RX ORDER — POTASSIUM CL/LIDO/0.9 % NACL 10MEQ/0.1L
10 INTRAVENOUS SOLUTION, PIGGYBACK (ML) INTRAVENOUS
Status: DISCONTINUED | OUTPATIENT
Start: 2019-12-10 | End: 2019-12-12 | Stop reason: HOSPADM

## 2019-12-10 RX ORDER — NICOTINE POLACRILEX 4 MG
15-30 LOZENGE BUCCAL
Status: DISCONTINUED | OUTPATIENT
Start: 2019-12-10 | End: 2019-12-12 | Stop reason: HOSPADM

## 2019-12-10 RX ORDER — POTASSIUM CHLORIDE 1500 MG/1
40 TABLET, EXTENDED RELEASE ORAL ONCE
Status: COMPLETED | OUTPATIENT
Start: 2019-12-10 | End: 2019-12-10

## 2019-12-10 RX ADMIN — ACETAMINOPHEN 650 MG: 325 TABLET, FILM COATED ORAL at 17:45

## 2019-12-10 RX ADMIN — GABAPENTIN 600 MG: 600 TABLET, FILM COATED ORAL at 16:31

## 2019-12-10 RX ADMIN — FUROSEMIDE 40 MG: 40 TABLET ORAL at 16:31

## 2019-12-10 RX ADMIN — INSULIN ASPART 1 UNITS: 100 INJECTION, SOLUTION INTRAVENOUS; SUBCUTANEOUS at 17:45

## 2019-12-10 RX ADMIN — ARIPIPRAZOLE 10 MG: 10 TABLET ORAL at 20:44

## 2019-12-10 RX ADMIN — POTASSIUM CHLORIDE 40 MEQ: 1.5 POWDER, FOR SOLUTION ORAL at 16:30

## 2019-12-10 RX ADMIN — OMEPRAZOLE 20 MG: 20 CAPSULE, DELAYED RELEASE ORAL at 20:43

## 2019-12-10 RX ADMIN — GABAPENTIN 300 MG: 300 CAPSULE ORAL at 22:04

## 2019-12-10 RX ADMIN — SIMVASTATIN 20 MG: 20 TABLET, FILM COATED ORAL at 22:04

## 2019-12-10 RX ADMIN — GABAPENTIN 600 MG: 600 TABLET, FILM COATED ORAL at 22:04

## 2019-12-10 RX ADMIN — POTASSIUM CHLORIDE 40 MEQ: 1500 TABLET, EXTENDED RELEASE ORAL at 13:26

## 2019-12-10 RX ADMIN — CEFAZOLIN SODIUM 2 G: 2 INJECTION, SOLUTION INTRAVENOUS at 12:44

## 2019-12-10 RX ADMIN — SODIUM CHLORIDE, POTASSIUM CHLORIDE, SODIUM LACTATE AND CALCIUM CHLORIDE 1000 ML: 600; 310; 30; 20 INJECTION, SOLUTION INTRAVENOUS at 12:22

## 2019-12-10 RX ADMIN — BUPROPION HYDROCHLORIDE 150 MG: 150 TABLET, FILM COATED, EXTENDED RELEASE ORAL at 20:44

## 2019-12-10 RX ADMIN — CLINDAMYCIN PHOSPHATE 900 MG: 900 INJECTION, SOLUTION INTRAVENOUS at 20:42

## 2019-12-10 RX ADMIN — TRAZODONE HYDROCHLORIDE 150 MG: 50 TABLET ORAL at 22:04

## 2019-12-10 RX ADMIN — CEFAZOLIN SODIUM 2 G: 2 INJECTION, SOLUTION INTRAVENOUS at 13:32

## 2019-12-10 ASSESSMENT — MIFFLIN-ST. JEOR
SCORE: 1596.99
SCORE: 1513.75

## 2019-12-10 NOTE — ED NOTES
Seen in ED 1 week ago for cellulitis left leg behind knee-took abx for 1 week and left leg is worse with redness pain and swelling into thigh-also rt leg cellulitis from knee area to thigh

## 2019-12-10 NOTE — PHARMACY-ANTICOAGULATION SERVICE
Clinical Pharmacy - Warfarin Dosing Consult     Pharmacy has been consulted to manage this patient s warfarin therapy.  Indication: DVT/PE Prophylaxis  Therapy Goal: INR 2-3  Provider/Team: DERREK ZABALA Anticoag Clinic: DERREK HARMON  Warfarin Prior to Admission: Yes  Warfarin PTA Regimen: 12.5mg MWF, 15mg TTSS  Recent documented change in oral intake/nutrition: Unknown    INR   Date Value Ref Range Status   12/10/2019 4.82 (H) 0.86 - 1.14 Final   12/02/2019 2.96 (H) 0.86 - 1.14 Final       Recommend warfarin 0 mg today.  Pharmacy will monitor Bia Bill daily and order warfarin doses to achieve specified goal.      Please contact pharmacy as soon as possible if the warfarin needs to be held for a procedure or if the warfarin goals change.

## 2019-12-10 NOTE — H&P
[facility]    History and Physical - Hospitalist Service       Date of Admission:  12/10/2019    Assessment & Plan   Bia Bill is a 54 year old female admitted on 12/10/2019. She is being admitted for evaluation and treatment of a rash thought to be cellulitis.    Rash, presumed cellulitis  Visually appears to be consistent with cellulitis. However, patient is afebrile, no leukocytosis. Rash is on bilateral lower extremities. Failed outpatient therapy with Ancef x 1, then Keflex (admission day is day 7 of 10), no improvement. Unclear etiology for rash although possibly cellulitis, was given Ancef in the emergency department.  - Change antibiotics to clindamycin  - Blood culture pending  - CBC in am  - See exam section for picture of rash    Elevated CRP   Admit CRP 23.8. Unclear cause - has noted history of Lupus anticoagulant in chart, although possibly related to rash/cellulitis.  - Treat cellulitis as above  - Recheck CRP in am    COPD (chronic obstructive pulmonary disease)  Mild persistent asthma  COPD and asthma at baseline (PFT's 2013 showing obstructive lung disease with air trapping vs restrictive lung disease). Managed prior to admission with Advair 230-21 bid and prn albuterol. Stable.  - Continue prior to admission Advair  - Prn albuterol nebs available    Lymphedema, chronic  Patient has diagnosis of lymphedema, but no formal diagnosis for heart failure. Echo March 2018 with mild LV hypertrophy, EF 60-65%. Lymphedema managed prior to admission with lasix 60 mg q am / 40 mg q pm. Edema is reportedly stable compared to baseline.   - Continue prior to admission lasix (60 mg am/40 mg pm)  - Lymphedema wraps as able    Type 2 diabetes mellitus   Most recent A1c 6.1 (10/29/19), diet controlled, not on glycemic control agents prior to admission.  - consistent carbohydrate diet  - Sliding scale insulin  - Hyper/hypoglycemia protocol    Personal history of DVT (deep venous  thrombosis)  Long term use of anticoagulant therapy  DVT postpartum 1992 and again in 2004 due to decreased mobility. Managed prior to admission with coumadin, INR goal 2-3. Admit INR 4.82. Low concern for DVT/PE by exam/history.  - Pharmacy to manage coumadin dosing  - Supratherapeutic INR, but no need to reverse, will allow to drift  - PCDs ordered    SECONDARY POLYCYTHEMIA   Follows with Dr. Watts, has upcoming visit. Is following CBC monthly, treated with prn phlebotomy. Has not needed phlebotomy recently. Admit hematocrit 48.4.  - Continue with outpatient management    Hypertension   Managed prior to admission with lasix dosed as above.   - Continue prior to admission lasix    PVD (peripheral vascular disease) with claudication  History of left lower extremity stents x 3. Is on Zocor 20 mg q hs.  - Continue Zocor    Polyneuropathy in other diseases classified elsewhere  RLS (restless legs syndrome)  Stable. Managed prior to admission with gabapentin (600 mg tid, 900 mg q hs) and Mirapex 0.125 mg q hs.  - Continue prior to admission Mirapex and gabapentin    Esophageal reflux  Managed prior to admission with omeprazole 20 mg bid, continue.    Hypothyroidism  Managed prior to admission with levothyroxine 150 mcg daily, continue.    Moderate mixed bipolar I disorder  Personality disorder, depressive  Stable mood. Managed prior to admission with Ability 10 mg q pm, Wellbutrin  mg q pm, Lamictal 100 mg daily, and trazodone 150 mg q hs.  - Continue prior to admission Abilify, Wellbutrin, trazodone, and Lamictal    Chronic gout  No evidence of gout on exam. Managed prior to admission with allopurinol 300 mg daily, continue.    GILES (Obstructive Sleep Apnea)-Moderate (AHI 16)  Patient formerly used a CPAP, but was told to stop using it until she could go in for another sleep study.  - CPAP use on hold, not continued during hospitalization    Morbid obesity with alveolar hypoventilation  BMI 46.95. Contributes to  "overall morbidity and mortality, as well as impaired respiratory status.       Diet: Regular Diet Adult    DVT Prophylaxis: Warfarin  Goldsmith Catheter: not present  Code Status: Full - discussed with patient on admission    Disposition Plan   Expected discharge: 1-2 days, possibly longer if not improving, recommended to prior living arrangement once rash improves, appropriate antibiotic plan in place.  Entered: Sherita Hollis PA-C 12/10/2019, 2:22 PM     The patient's care was discussed with the Attending Physician, Dr. Rylan Yanes and Patient.    Sherita Hollis PA-C  [facility]    ______________________________________________________________________    Chief Complaint   Painful leg rash, both legs    History is obtained from the patient, review of EMR, and emergency department documentation.    History of Present Illness   Bia Bill is a 54 year old female who presented to the emergency department for evaluation of a bilateral leg rash.    Rash was first noticed about 10 days ago, initially on the left leg near the knee. Since that time the rash has grown, now localized to a large area around anterior, medial, and posterior left leg. She has also developed a similar rash on the right leg near the knee, but not as large.     She was seen in the emergency department on 12/4 for the rash, was given 1 dose of Ancef and then discharged home with a 10 day prescription for Keflex 500 mg qid. She reports compliance with the medication. The rash never improved, only worsened. She returned to the emergency department for re-evaluation.    Rash is painful, described as a constant burning pain, as well as a \"pins and needles\" pain with any pressure or touch. There is no known injury, pruritis, wound, or any exudate associated with the rash. Using lotion on the wound is painful and stings. The pain has been keeping her up at night, she is not sleeping well.    She reports chills " but no known fevers.     She has chronic lymphedema, but stable. She is able to tolerate use of her lymphedema wraps. She has a chronic cough that is unchanged from baseline. Since starting Keflex she reports new constipation, but no diarrhea. She also has a dry mouth, which is new.    Review of Systems    The 10 point Review of Systems is negative other than noted in the HPI or here.     Past Medical History    I have reviewed this patient's medical history and updated it with pertinent information if needed.   Past Medical History:   Diagnosis Date     Acromioclavicular joint arthritis 1/25/2017     Acute bronchospasm 8/15/2016     Acute gouty arthritis 8/4/2014     Anxiety state, unspecified      Bipolar I disorder, most recent episode (or current) unspecified      Closed fracture of metatarsal bone 6/5/2007     Depressive disorder, not elsewhere classified      DVT, lower extremity (H)      Headache 10/17/2014     Impingement syndrome of shoulder region 1/25/2017     Polycythemia, secondary     [place] HGB      Right shoulder pain 10/14/2014     Trochanteric bursitis of both hips 1/11/2016       Past Surgical History   I have reviewed this patient's surgical history and updated it with pertinent information if needed.  Past Surgical History:   Procedure Laterality Date     BONE MARROW BIOPSY, BONE SPECIMEN, NEEDLE/TROCAR N/A 11/17/2014    Procedure: BIOPSY BONE MARROW;  Surgeon: Hay Aaron MD;  Location: [facility]     D & C  10/26/09    with uterine ablation     ENDOVASCULAR PLACEMENT VASCULAR DEVICE Left     Left leg stent x 3     ESOPHAGOSCOPY, GASTROSCOPY, DUODENOSCOPY (EGD), COMBINED  4/21/2014    Procedure: Gastroscopy;  Surgeon: Moris Thomas MD;  Location: [facility]     HYSTERECTOMY, PAP NO LONGER INDICATED  1-4-2010     LAPAROSCOPIC CHOLECYSTECTOMY N/A 1/4/2019    Procedure: Laparoscopic cholecystectomy;  Surgeon: Aaron Siegel MD;  Location: [facility]     LITHOTRIPSY  2004    Lithotrypsy        Social History   I have reviewed this patient's social history and updated it with pertinent information if needed.  Social History     Tobacco Use     Smoking status: Former Smoker     Packs/day: 0.50     Years: 34.00     Pack years: 17.00     Types: Cigarettes     Last attempt to quit: 2019     Years since quittin.0     Smokeless tobacco: Never Used     Tobacco comment: started at age 11.  Quit during pregnancyX4.  Quit 3 months ago.    Substance Use Topics     Alcohol use: No     Alcohol/week: 0.0 standard drinks     Comment: quit      Drug use: No     Comment: past hx 22 years ago/ uppers downers, canibus       Family History   I have reviewed this patient's family history and updated it with pertinent information if needed.   Family History   Problem Relation Age of Onset     Hypertension Mother      Diabetes Mother      Blood Disease Mother      Heart Disease Mother         chf     Cerebrovascular Disease Mother      Hypertension Father      Cancer Father         lung cancer     Allergies Sister      Diabetes Sister      Depression Sister      Hypertension Sister        Prior to Admission Medications   Prior to Admission Medications   Prescriptions Last Dose Informant Patient Reported? Taking?   ARIPiprazole (ABILIFY) 10 MG tablet 2019 at pm Self No Yes   Sig: TAKE ONE TABLET BY MOUTH EVERY DAY   Patient taking differently: Take 10 mg by mouth every evening    EPINEPHrine (EPIPEN/ADRENACLICK/OR ANY BX GENERIC EQUIV) 0.3 MG/0.3ML injection 2-pack More than a month at Unknown time Self No No   Sig: Inject 0.3 mLs (0.3 mg) into the muscle once as needed for anaphylaxis   VITAMIN D3 1000 units tablet 12/10/2019 at am Self No Yes   Sig: TAKE ONE TABLET BY MOUTH ONCE DAILY   Patient taking differently: Take 1,000 Units by mouth daily    acetaminophen (TYLENOL) 500 MG tablet 2019 at afternoon Self Yes Yes   Sig: Take 500-1,000 mg by mouth every 6 hours as needed for mild pain   albuterol  (PROAIR HFA/PROVENTIL HFA/VENTOLIN HFA) 108 (90 Base) MCG/ACT inhaler 12/9/2019 at pm Self No Yes   Sig: Inhale 2 puffs into the lungs every 6 hours as needed for shortness of breath / dyspnea or wheezing   albuterol (PROVENTIL) (2.5 MG/3ML) 0.083% neb solution 12/9/2019 at pm Self No Yes   Sig: Take 1 vial (2.5 mg) by nebulization every 6 hours as needed for shortness of breath / dyspnea or wheezing   allopurinol (ZYLOPRIM) 300 MG tablet 12/10/2019 at am Self No Yes   Sig: Take 1 tablet (300 mg) by mouth daily   buPROPion (WELLBUTRIN XL) 150 MG 24 hr tablet 12/9/2019 at pm Self No Yes   Sig: TAKE ONE TABLET BY MOUTH EVERY DAY IN THE EVENING   Patient taking differently: Take 150 mg by mouth every evening TAKE ONE TABLET BY MOUTH EVERY DAY IN THE EVENING   cephALEXin (KEFLEX) 500 MG capsule 12/10/2019 at am Self No Yes   Sig: Take 1 capsule (500 mg) by mouth 4 times daily for 10 days   fluticasone-salmeterol (ADVAIR HFA) 230-21 MCG/ACT inhaler 12/9/2019 at pm Self No Yes   Sig: Inhale 2 puffs into the lungs 2 times daily   furosemide (LASIX) 20 MG tablet 12/10/2019 at am Self No Yes   Sig: 3 tab in am and 2 in pm.   gabapentin (NEURONTIN) 300 MG capsule 12/9/2019 at hs Self No Yes   Sig: TAKE ONE CAPSULE BY MOUTH ONCE DAILY AT BEDTIME WITH A 600 MG TABLET FOR A TOTAL DOSE  MG   Patient taking differently: Take 300 mg by mouth At Bedtime TAKE ONE CAPSULE BY MOUTH ONCE DAILY AT BEDTIME WITH A 600 MG TABLET FOR A TOTAL DOSE  MG   gabapentin (NEURONTIN) 600 MG tablet 12/10/2019 at am Self No Yes   Sig: TAKE ONE TABLET BY MOUTH THREE TIMES A DAY   Patient taking differently: Take 600 mg by mouth 3 times daily TAKE ONE TABLET BY MOUTH THREE TIMES A DAY   ipratropium - albuterol 0.5 mg/2.5 mg/3 mL (DUONEB) 0.5-2.5 (3) MG/3ML neb solution 12/7/2019 Self No Yes   Sig: Take 1 vial (3 mLs) by nebulization every 6 hours as needed for shortness of breath / dyspnea or wheezing   lamoTRIgine (LAMICTAL) 100 MG tablet  12/10/2019 at am Self No Yes   Sig: Take 1 tablet (100 mg) by mouth daily   levothyroxine (SYNTHROID/LEVOTHROID) 150 MCG tablet  Self No No   Sig: Take 1 tablet (150 mcg) by mouth daily   levothyroxine (SYNTHROID/LEVOTHROID) 175 MCG tablet 12/10/2019 at am Self No Yes   Sig: Take 1 tablet (175 mcg) by mouth daily   mometasone-formoterol (DULERA) 200-5 MCG/ACT oral inhaler 12/8/2019 Self No Yes   Sig: Inhale 2 puffs into the lungs 2 times daily   omeprazole (PRILOSEC) 20 MG DR capsule 12/8/2019 Self No Yes   Sig: TAKE ONE CAPSULE BY MOUTH TWICE A DAY   Patient taking differently: Take 20 mg by mouth 2 times daily    order for DME  Self No No   Sig: Equipment being ordered: INCONTINENCE PADS   QID   order for DME  Self No No   Sig: Equipment being ordered: DEPENDS SIZE LARGE   order for DME Past Month at Unknown time Self Yes Yes   Sig: Equipment being ordered: CPAP  AIRSENSE 10  5-18 CM H20  # [id number]   DN# [id number]   order for DME  Self No No   Sig: Equipment being ordered: Nebulizer   order for DME  Self No Yes   Sig: Equipment being ordered:x2 [employer] 30/40mmHg compression wraps with x2 extra prs of compression liners   potassium chloride ER (K-DUR/KLOR-CON M) 10 MEQ CR tablet 12/10/2019 at am Self No Yes   Sig: TAKE ONE TABLET BY MOUTH ONCE DAILY   Patient taking differently: Take 10 mEq by mouth daily    pramipexole (MIRAPEX) 0.125 MG tablet 12/9/2019 at hs Self No Yes   Sig: TAKE 1-2 TABLETS BY MOUTH AT BEDTIME   Patient taking differently: Take 0.125-2.5 mg by mouth At Bedtime TAKE 1-2 TABLETS BY MOUTH AT BEDTIME   predniSONE (DELTASONE) 20 MG tablet   No No   Sig: Take 2 tablets (40 mg) by mouth daily for 3 days, THEN 1 tablet (20 mg) daily for 4 days, THEN 0.5 tablets (10 mg) daily for 4 days.   simvastatin (ZOCOR) 20 MG tablet 12/9/2019 at hs Self No Yes   Sig: TAKE ONE TABLET BY MOUTH EVERY NIGHT AT BEDTIME   Patient taking differently: Take 20 mg by mouth At Bedtime    traZODone (DESYREL) 150 MG tablet  2019 at hs Self No Yes   Sig: Take 1 tablet (150 mg) by mouth At Bedtime   warfarin ANTICOAGULANT (JANTOVEN ANTICOAGULANT) 5 MG tablet 2019 at pm Self No Yes   Si.5 mg (5 mg x 2.5) every Mon, Wed, Fri; 15 mg (5 mg x 3) all other days OR AS DIRECTED BY [facility]      Facility-Administered Medications: None     Allergies   Allergies   Allergen Reactions     Darvocet [Propoxyphene N-Apap] Anaphylaxis     Darvocet,Percocet      Percocet [Oxycodone-Acetaminophen] Anaphylaxis, Hives and Swelling     Has tolerated hydromorphone in the past.      Asa [Aspirin] Hives     Aspirin causes seizures and hives, throat swelling.   Has tolerated ketorolac in the past.        Bee      Ibuprofen Swelling     Throat swelling per patient      Lyrica [Pregabalin] Other (See Comments) and Swelling     dizziness     Povidone Iodine Rash     Reaction to topical betadine     Tape [Adhesive Tape] Rash       Physical Exam   Vital Signs: Temp: 98  F (36.7  C) Temp src: Oral BP: 114/62 Pulse: 84 Heart Rate: 82 Resp: 16 SpO2: 94 % O2 Device: None (Room air)    Weight: 243 lbs 0 oz    Constitutional: Alert, oriented, cooperative, no apparent distress, appears nontoxic, speaking in full sentences.     Eyes: Eyes are clear, pupils are reactive. No scleral icterus. Extroccular movements intact.     HEENT: Oropharynx is clear and moist, no lesions. Normocephalic, no evidence of cranial trauma.      Cardiovascular: Regular rhythm and rate, normal S1 and S2. No murmur, rubs, or gallops. Peripheral pulses intact bilaterally. Trace/+1 bilateral lower extremity edema.    Respiratory: Fine crackles at bilateral bases. Otherwise lung sounds are clear to auscultation bilaterally without wheezes or rhonchi.    GI: Soft, non-distended. Non-tender, no rebound or guarding. No hepatosplenomegaly or masses appreciated. Normal bowel sounds.     Musculoskeletal: Without obvious deformity, normal range of motion. Distal CMS intact.      Skin: Warm and dry, no  ecchymoses. No mottling of skin. Confluent flat erythema present on bilateral lower extremities with non-distinct boarders - left leg with large area covering anterior, medial, and posterior knee, right leg with smaller area covering same distribution. No wound or exudate.    Left lower extremity:      Right lower extremity:           Neurologic: Patient moves all extremities.  is symmetric. Gross strength and sensation are equal bilaterally.    Genitourinary: Deferred      Data   Data reviewed today: I reviewed all medications, new labs and imaging results over the last 24 hours. I personally reviewed no images or EKG's today.    Recent Labs   Lab 12/10/19  1205 12/10/19  1116   WBC 4.8  --    HGB 14.0  --    MCV 87  --      --    INR  --  4.82*     --    POTASSIUM 2.9*  --    CHLORIDE 100  --    CO2 37*  --    BUN 9  --    CR 0.72  --    ANIONGAP 2*  --    HOOD 8.7  --    *  --    ALBUMIN 3.1*  --    PROTTOTAL 6.7*  --    BILITOTAL 0.4  --    ALKPHOS 160*  --    ALT 27  --    AST 19  --      No results found for this or any previous visit (from the past 24 hour(s)).

## 2019-12-10 NOTE — ED NOTES
meds given in pudding, due to size of medication, pt tolerated well she remains a/o x 4. Will order lunch as it may be up to 2 hours before she is to Med/Surg

## 2019-12-10 NOTE — ED PROVIDER NOTES
History     Chief Complaint   Patient presents with     Cellulitis     returns for worsening cellulitis on the back of both legs; tried antibiotic without improvement.      HPI  Bia Bill is a 54 year old female with past medical history of anxiety, bipolar disorder, DVTs currently on warfarin, alcohol abuse in remission, lymphedema who presents to the emergency department with reports of worsening cellulitis.  Patient reports that she was seen in the emergency room 1 week ago for cellulitis behind her left leg and was started on an antibiotic.  Patient states she has been taking the antibiotic without complication.  Patient states that this past Saturday it was noted that she developed chills increasing pain in now in both legs, and increasing swelling in bilateral legs.  Patient states yesterday she noticed increasing redness in bilateral legs.    Patient denies any medication changes.  Patient denies fever, aches, sweats, ear pain, eye pain, throat pain, cough, wheezing, shortness of breath, abdominal pain, nausea, vomiting, diarrhea, dysuria, speech difficulty, mental confusion, thoughts of harming self.  Patient reports feeling well otherwise    Patient does admit that her last bowel movement was on Sunday and she feels slightly constipated as well.    Allergies:  Allergies   Allergen Reactions     Darvocet [Propoxyphene N-Apap] Anaphylaxis     Darvocet,Percocet      Percocet [Oxycodone-Acetaminophen] Anaphylaxis, Hives and Swelling     Has tolerated hydromorphone in the past.      Asa [Aspirin] Hives     Aspirin causes seizures and hives, throat swelling.   Has tolerated ketorolac in the past.        Bee      Ibuprofen Swelling     Throat swelling per patient      Lyrica [Pregabalin] Other (See Comments) and Swelling     dizziness     Povidone Iodine Rash     Reaction to topical betadine     Tape [Adhesive Tape] Rash       Problem List:    Patient Active Problem List    Diagnosis Date Noted     Rash  12/10/2019     Priority: Medium     Type 2 diabetes mellitus (H) 12/10/2019     Priority: Medium     A1c Oct 2019 = 6.1       Bilateral edema of lower extremity 12/10/2019     Priority: Medium     Skin infection 12/10/2019     Priority: Medium     Bronchitis 11/09/2019     Priority: Medium     COPD exacerbation (H) 10/29/2019     Priority: Medium     Personal history of DVT (deep vein thrombosis) 10/29/2019     Priority: Medium     She had a DVT post pregnancy and delivery in 1992.   3/26/2004: Hospitalized at [facility] for extensive left lower leg DVT.  This occurred a month after pneumonia episode and decreased activity.  She had lytic therapy, tPA followed by Possis mechanical thrombectomy device and three stents in veins.  She did have Groshong catheter at the time.  There was a positive lupus anticoagulant, negative Factor V and cardiolipin clement.        Acute respiratory failure with hypoxia and hypercapnia (H) 10/29/2019     Priority: Medium     Lymphedema of both lower extremities 10/29/2019     Priority: Medium     Cellulitis 06/06/2019     Priority: Medium     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 10/03/2018     Priority: Medium     Conductive hearing loss of left ear with restricted hearing of right ear 04/17/2018     Priority: Medium     Sensorineural hearing loss (SNHL) of right ear with restricted hearing of left ear 04/17/2018     Priority: Medium     Morbid obesity with alveolar hypoventilation (H) 02/01/2017     Priority: Medium     Cyst of left ovary 01/11/2016     Priority: Medium     PVD (peripheral vascular disease) with claudication (H) 10/30/2015     Priority: Medium     LEFT LE. STENT x 3 LEFT UPPER LEG       Morbid obesity (H) 06/17/2015     Priority: Medium     Vitamin D deficiency 11/05/2014     Priority: Medium     Problem list name updated by automated process. Provider to review       Sebaceous cyst of right axilla 10/14/2014     Priority:  Medium     HTN, goal below 140/90 10/14/2014     Priority: Medium     Left leg pain 10/14/2014     Priority: Medium     History of stroke symptoms, stroke ruled out on imaging 07/10/2014     Priority: Medium     Admissions in 2011 and 2014 for stroke-like symptoms. Evaluated by neurology on both occasions, imaging not consistent with stroke. Thought to be due to conversion disorder in 2011.       Somatization disorder 07/10/2014     Priority: Medium     Benign neoplasm of colon (POLYPOSIS) 06/04/2014     Priority: Medium     Specimen #: [id number]   Collected: 11/7/2017   Received: 11/7/2017   Reported: 11/9/2017 19:59   Ordering Phy(s): CARMEL GALLOWAY     For improved result formatting, select 'View Enhanced Report Format'   under Linked Documents section.     SPECIMEN(S):   Colon polyp, 30 cm     FINAL DIAGNOSIS:   Colon at 30 cm, mucosal biopsy:   - Hyperplastic polyp, with focal changes suggestive of sessile serrated   adenoma.     Electronically signed out by:     Eric Rivas M.D.  = per Surveillance Recommendations:  Repeat in 5 yrs.    Notes Recorded by Moris Thomas MD on 4/25/2014 at 1:36 PM  Adenomatous polyp colonoscopy 5 years         DDD (degenerative disc disease), cervical 12/17/2013     Priority: Medium     Health Care Home 09/03/2013     Priority: Medium     Status:  Accepted  Care Coordinator:  Alla Fernandez    See Letters for [facility] Care Plan  Date:  October 20, 2014         Rosacea 08/26/2013     Priority: Medium     COPD (chronic obstructive pulmonary disease) (H) 08/23/2012     Priority: Medium     Pulmonary Function test by Sylvester Erickson MD on 9/13/2013 3:38 PM   IDENTIFICATION: Bia Bill is a 47-year-old female with obesity and a history of tobacco use.   RESULTS:   1. Spirometry: FEV1 moderately reduced. FVC moderately reduced. FEV1/FVC ratio normal.   2. Bronchodilator Response: No significant change is seen with bronchodilators.   INTERPRETATION: Moderate reduction in FEV1 and FVC  with well-preserved FEV1/FVC ratio. This can be seen in the setting of obstructive lung disease with air trapping or with restrictive lung disease. Clinical correlation is needed. Further testing with lung volumes and DLCO may be useful.   KACEY JHA MD          Moderate mixed bipolar I disorder (H) 10/13/2011     Priority: Medium     Problem list name updated by automated process. Provider to review       Personality disorder, depressive 10/13/2011     Priority: Medium     Erythrocytosis      Priority: Medium     Smoker 04/01/2011     Priority: Medium     3/2011  Not interested in quitting at this time.   Will consider and let us know how we can be of assistance.   Understands long term risks associated with same and increased risk of blood clot formation.        GILES (Obstructive Sleep Apnea)-Moderate (AHI 16) 03/01/2010     Priority: Medium     RLS (restless legs syndrome) 03/01/2010     Priority: Medium     Ferritin 11.       Fatty liver 08/28/2009     Priority: Medium     US on 8/26/2009        Severe episode of recurrent major depressive disorder (H) 11/03/2008     Priority: Medium     Hypothyroidism 05/13/2008     Priority: Medium     was hyperthyroid - Hashimoto's and graves and had iodine treatment done at [place] in early 2008.    Now hypothyroid - is following at [facility], on 125mcg levothyroxine - see scanned documents       Esophageal reflux 04/29/2008     Priority: Medium     Developmental reading disorder 09/12/2007     Priority: Medium     Problem list name updated by automated process. Provider to review       Chondromalacia of patella 02/27/2007     Priority: Medium     SECONDARY POLYCYTHEMIA      Priority: Medium     Betheda HGB, a high-oxygen affinity hemoglobin which results in a normal compensatory erythrocytosis.  There is no specific therapy needed.  I did order a hemoglobin electrophoresis today to help confirm this in Bia, as she tells me she has never had this test performed.  Dr. Riley 1/17/07       Alcohol abuse, in remission 08/01/2006     Priority: Medium     Quit 89 [address] court ordered, lost her children,        Mild persistent asthma 07/11/2006     Priority: Medium     Polyneuropathy in other diseases classified elsewhere (H) 07/11/2006     Priority: Medium     Has had chronic pain in the left groin and upper leg secondary to a blood clot in the thigh, treated with neurontin without benefit, still has chronic pain since 2005, sharp shooting intermittantly.    MRI/MRA 12/07 - [place] - see scanned documents   - had mild nonspecific white matter changes  Otherwise negative     MRI c- spine [place] 12/07 - no cervical cord abn.  Small disc protrusion C6-C7, no impingement       DVT (deep venous thrombosis) (H) 07/11/2006     Priority: Medium     She had a DVT post pregnancy and delivery in 1992.   3/26/2004:Hospitalized at [facility] for extensive left lower leg DVT.  This occurred a month after pneumonia episode and decreased activity.  She had lytic therapy, tPA followed by Possis mechanical thrombectomy device and three stents in veins.  She did have Groshong catheter at the time.  There was a positive lupus anticoagulant, negative Factor V and cardiolipin clement.           Past Medical History:    Past Medical History:   Diagnosis Date     Acromioclavicular joint arthritis 1/25/2017     Acute bronchospasm 8/15/2016     Acute gouty arthritis 8/4/2014     Anxiety state, unspecified      Bipolar I disorder, most recent episode (or current) unspecified      Closed fracture of metatarsal bone 6/5/2007     Depressive disorder, not elsewhere classified      DVT, lower extremity (H)      Headache 10/17/2014     Impingement syndrome of shoulder region 1/25/2017     Polycythemia, secondary      Right shoulder pain 10/14/2014     Trochanteric bursitis of both hips 1/11/2016       Past Surgical History:    Past Surgical History:   Procedure Laterality Date     BONE MARROW BIOPSY, BONE SPECIMEN,  "NEEDLE/TROCAR N/A 2014    Procedure: BIOPSY BONE MARROW;  Surgeon: Hay Aaron MD;  Location: [facility]     D & C  10/26/09    with uterine ablation     ENDOVASCULAR PLACEMENT VASCULAR DEVICE Left     Left leg stent x 3     ESOPHAGOSCOPY, GASTROSCOPY, DUODENOSCOPY (EGD), COMBINED  2014    Procedure: Gastroscopy;  Surgeon: Moris Thomas MD;  Location: [facility]     HYSTERECTOMY, PAP NO LONGER INDICATED  2010     LAPAROSCOPIC CHOLECYSTECTOMY N/A 2019    Procedure: Laparoscopic cholecystectomy;  Surgeon: Aaron Siegel MD;  Location: [facility]     LITHOTRIPSY  2004    Lithotrypsy       Family History:    Family History   Problem Relation Age of Onset     Hypertension Mother      Diabetes Mother      Blood Disease Mother      Heart Disease Mother         chf     Cerebrovascular Disease Mother      Hypertension Father      Cancer Father         lung cancer     Allergies Sister      Diabetes Sister      Depression Sister      Hypertension Sister        Social History:  Marital Status:   [4]  Social History     Tobacco Use     Smoking status: Former Smoker     Packs/day: 0.50     Years: 34.00     Pack years: 17.00     Types: Cigarettes     Last attempt to quit: 2019     Years since quittin.0     Smokeless tobacco: Never Used     Tobacco comment: started at age 11.  Quit during pregnancyX4.  Quit 3 months ago.    Substance Use Topics     Alcohol use: No     Alcohol/week: 0.0 standard drinks     Comment: quit      Drug use: No     Comment: past hx 22 years ago/ uppers downers, canibus        Medications:    No current outpatient medications on file.    Review of Systems  As mentioned above in the history present illness. All other systems were reviewed and are negative.    Physical Exam   BP: 131/67  Pulse: 79  Heart Rate: 82  Temp: 98  F (36.7  C)  Resp: 16  Height: 147.3 cm (4' 10\")  Weight: 110.2 kg (243 lb)  SpO2: 95 %      Physical Exam  General Appearance:  alert, " active, mild distress, cooperative and smiling  Head: Normocephalic. No masses, lesions, tenderness or abnormalities  Eyes: conjuctiva clear, PERRL, EOM intact  Ears: External ears normal. Canals clear. TM's normal.  Nose: Nares normal  Mouth: normal  Neck: Supple, no cervical adenopathy, no thyromegaly  Heart: regular rate and rhythm  with normal S1, S2 ; no murmur, rub or gallops  Lungs: clear to auscultation and with normal respiratory effort  Abdomen: Soft, nontender.  Normal bowel sounds.    Genitals: Deferred  Extremities: no peripheral edema, peripheral pulses normal, calluses noted on bilateral feet, erythema and tenderness noted to bilateral thighs - see photo  Skin: positives: erythemaupper legs                ED Course     ED Course as of Dec 10 1744   Tue Dec 10, 2019   1240 Consulted with Dr. Raulito Flores who evaluated patient and plan was collaboratively completed.        Procedures    Results for orders placed or performed during the hospital encounter of 12/10/19 (from the past 24 hour(s))   CBC with platelets, differential   Result Value Ref Range    WBC 4.8 4.0 - 11.0 10e9/L    RBC Count 5.58 (H) 3.8 - 5.2 10e12/L    Hemoglobin 14.0 11.7 - 15.7 g/dL    Hematocrit 48.4 (H) 35.0 - 47.0 %    MCV 87 78 - 100 fl    MCH 25.1 (L) 26.5 - 33.0 pg    MCHC 28.9 (L) 31.5 - 36.5 g/dL    RDW 20.9 (H) 10.0 - 15.0 %    Platelet Count 168 150 - 450 10e9/L    Diff Method Automated Method     % Neutrophils 65.4 %    % Lymphocytes 26.3 %    % Monocytes 6.7 %    % Eosinophils 1.0 %    % Basophils 0.4 %    % Immature Granulocytes 0.2 %    Nucleated RBCs 0 0 /100    Absolute Neutrophil 3.1 1.6 - 8.3 10e9/L    Absolute Lymphocytes 1.3 0.8 - 5.3 10e9/L    Absolute Monocytes 0.3 0.0 - 1.3 10e9/L    Absolute Eosinophils 0.1 0.0 - 0.7 10e9/L    Absolute Basophils 0.0 0.0 - 0.2 10e9/L    Abs Immature Granulocytes 0.0 0 - 0.4 10e9/L    Absolute Nucleated RBC 0.0    Comprehensive metabolic panel   Result Value Ref Range     Sodium 139 133 - 144 mmol/L    Potassium 2.9 (L) 3.4 - 5.3 mmol/L    Chloride 100 94 - 109 mmol/L    Carbon Dioxide 37 (H) 20 - 32 mmol/L    Anion Gap 2 (L) 3 - 14 mmol/L    Glucose 172 (H) 70 - 99 mg/dL    Urea Nitrogen 9 7 - 30 mg/dL    Creatinine 0.72 0.52 - 1.04 mg/dL    GFR Estimate >90 >60 mL/min/[1.73_m2]    GFR Estimate If Black >90 >60 mL/min/[1.73_m2]    Calcium 8.7 8.5 - 10.1 mg/dL    Bilirubin Total 0.4 0.2 - 1.3 mg/dL    Albumin 3.1 (L) 3.4 - 5.0 g/dL    Protein Total 6.7 (L) 6.8 - 8.8 g/dL    Alkaline Phosphatase 160 (H) 40 - 150 U/L    ALT 27 0 - 50 U/L    AST 19 0 - 45 U/L   CRP inflammation   Result Value Ref Range    CRP Inflammation 23.8 (H) 0.0 - 8.0 mg/L   Erythrocyte sedimentation rate auto   Result Value Ref Range    Sed Rate 7 0 - 30 mm/h   Blood culture (one site)   Result Value Ref Range    Specimen Description Blood Left Arm     Special Requests Aerobic and anaerobic bottles received     Culture Micro No growth after 2 hours    Glucose by meter   Result Value Ref Range    Glucose 167 (H) 70 - 99 mg/dL   US Lower Extremity Venous Duplex Bilateral    Narrative    BILATERAL LOWER EXTREMITY VENOUS DUPLEX ULTRASOUND   12/10/2019 5:34  PM     HISTORY: Lymphedema, cellulitis.    COMPARISON: None.    FINDINGS: Gray-scale, color and Doppler spectral analysis ultrasound  was performed of the bilateral legs. Compression and augmentation  imaging was performed.    There is no evidence for deep venous thrombosis.      Impression    IMPRESSION: No evidence for DVT within either leg.     *Note: Due to a large number of results and/or encounters for the requested time period, some results have not been displayed. A complete set of results can be found in Results Review.       Medications   albuterol (PROVENTIL) neb solution 2.5 mg (has no administration in time range)   allopurinol (ZYLOPRIM) tablet 300 mg (has no administration in time range)   ARIPiprazole (ABILIFY) tablet 10 mg (has no administration  in time range)   buPROPion (WELLBUTRIN XL) 24 hr tablet 150 mg (has no administration in time range)   gabapentin (NEURONTIN) capsule 300 mg (has no administration in time range)   gabapentin (NEURONTIN) tablet 600 mg (600 mg Oral Given 12/10/19 1631)   ipratropium - albuterol 0.5 mg/2.5 mg/3 mL (DUONEB) neb solution 3 mL (has no administration in time range)   lamoTRIgine (LaMICtal) tablet 100 mg (has no administration in time range)   levothyroxine (SYNTHROID/LEVOTHROID) tablet 175 mcg (has no administration in time range)   omeprazole (priLOSEC) CR capsule 20 mg (has no administration in time range)   potassium chloride ER (K-TAB/KLOR-CON) CR tablet 10 mEq (has no administration in time range)   simvastatin (ZOCOR) tablet 20 mg (has no administration in time range)   traZODone (DESYREL) tablet 150 mg (has no administration in time range)   lidocaine 1 % 0.1-1 mL (has no administration in time range)   lidocaine (LMX4) kit (has no administration in time range)   sodium chloride (PF) 0.9% PF flush 3 mL (has no administration in time range)   sodium chloride (PF) 0.9% PF flush 3 mL (3 mLs Intracatheter Not Given 12/10/19 1613)   ondansetron (ZOFRAN-ODT) ODT tab 4 mg (has no administration in time range)     Or   ondansetron (ZOFRAN) injection 4 mg (has no administration in time range)   acetaminophen (TYLENOL) tablet 650 mg (has no administration in time range)   acetaminophen (TYLENOL) Suppository 650 mg (has no administration in time range)   naloxone (NARCAN) injection 0.1-0.4 mg (has no administration in time range)   glucose gel 15-30 g (has no administration in time range)     Or   dextrose 50 % injection 25-50 mL (has no administration in time range)     Or   glucagon injection 1 mg (has no administration in time range)   clindamycin (CLEOCIN) infusion 900 mg (has no administration in time range)   prochlorperazine (COMPAZINE) injection 10 mg (has no administration in time range)     Or   prochlorperazine  (COMPAZINE) tablet 10 mg (has no administration in time range)     Or   prochlorperazine (COMPAZINE) Suppository 25 mg (has no administration in time range)   potassium chloride ER (K-DUR/KLOR-CON M) CR tablet 20-40 mEq (has no administration in time range)   potassium chloride (KLOR-CON) Packet 20-40 mEq (40 mEq Oral or Feeding Tube Given 12/10/19 1630)   potassium chloride 10 mEq in 100 mL sterile water intermittent infusion (premix) (has no administration in time range)   potassium chloride 10 mEq in 100 mL intermittent infusion with 10 mg lidocaine (has no administration in time range)   insulin aspart (NovoLOG) inj (RAPID ACTING) (has no administration in time range)   insulin aspart (NovoLOG) inj (RAPID ACTING) (has no administration in time range)   Warfarin Therapy Reminder (Check START DATE - warfarin may be starting in the FUTURE) (has no administration in time range)   warfarin-No DOSE today (has no administration in time range)   fluticasone-vilanterol (BREO ELLIPTA) 200-25 MCG/INH inhaler 1 puff (has no administration in time range)   furosemide (LASIX) tablet 60 mg (has no administration in time range)     And   furosemide (LASIX) tablet 40 mg (40 mg Oral Given 12/10/19 1631)   lactated ringers BOLUS 1,000 mL (1,000 mLs Intravenous New Bag 12/10/19 1222)   ceFAZolin (ANCEF) intermittent infusion 2 g in 100 mL dextrose PRE-MIX (2 g Intravenous Given 12/10/19 1332)   potassium chloride ER (K-DUR/KLOR-CON M) CR tablet 40 mEq (40 mEq Oral Given 12/10/19 1326)       Assessments & Plan (with Medical Decision Making)  Bia Bill is a 54 year old female with past medical history of anxiety, bipolar disorder, DVTs currently on warfarin, lymphedema who presents to the emergency department with reports of worsening cellulitis versus lymphedema.  On examination patient has bilateral upper leg erythema and tenderness.  Pedal pulses and lower extremities are normal.  CBC completed and is unremarkable.  CRP  completed and is elevated at 23.8.  ESR is normal.   Discussed care with Dr. Raulito Flores who evaluated patient and agrees with plan for admission to initiate antibiotics and evaluate patient.  Phone call placed to Dr. Godoy who agreed to admission and will initiate diuretics as well and the possibility that this is more lymphedema orientated than infectious orientated.  Orders placed for admission.     I have reviewed the nursing notes.    I have reviewed the findings, diagnosis, plan and need for follow up with the patient.  Current Discharge Medication List          Final diagnoses:   Bilateral lower extremity edema   Lymphedema of lower extremity   Chills       12/10/2019   [facility] EMERGENCY DEPARTMENT     Ayanna Whitley, APRN CNP  12/10/19 [id number]

## 2019-12-10 NOTE — TELEPHONE ENCOUNTER
"TRAZODONE HCL 150MG TABS      Last Written Prescription Date:  6/12/19  Last Fill Quantity: 90,   # refills: 1  Last Office Visit: 11/20/19  Future Office visit:    Next 5 appointments (look out 90 days)    Dec 11, 2019 10:40 AM CST  SHORT with Kd Leong MD  [facility] [address] [address]  [phone]   Dec 18, 2019  9:30 AM CST  Return Visit with Kd Morris  [facility] ([facility]) [address] [facility] [place] [address]  [phone]   Dec 18, 2019 11:15 AM CST  Return Visit with Joshua Watts MD  [facility] ([facility]) [facility]  [address]  [facility] [address]  [phone]   Jan 16, 2020  4:00 PM CST  Telephone Visit with Patel Bazzi MD  [facility] ([facility] [address]  [facility] [address]  [phone]           Requested Prescriptions   Pending Prescriptions Disp Refills     traZODone (DESYREL) 150 MG tablet [Pharmacy Med Name: TRAZODONE HCL 150MG TABS] 90 tablet 1     Sig: TAKE ONE TABLET BY MOUTH EVERY NIGHT AT BEDTIME       Serotonin Modulators Passed - 12/10/2019  5:03 AM        Passed - Recent (12 mo) or future (30 days) visit within the authorizing provider's specialty     Patient has had an office visit with the authorizing provider or a provider within the authorizing providers department within the previous 12 mos or has a future within next 30 days. See \"Patient Info\" tab in inbasket, or \"Choose Columns\" in Meds & Orders section of the refill encounter.              Passed - Medication is active on med list        Passed - Patient is age 18 or older        Passed - No active pregnancy on record        Passed - No positive pregnancy test in past 12 months          "

## 2019-12-10 NOTE — PROGRESS NOTES
Sleep Follow-Up Visit:    Date on this visit: 12/9/2019    Bia Bill comes in today for annual follow-up of moderate to low severe GILES treated with CPAP.      Pertinent PMHx of obesity, HTN, COPD, mixed bipolar I, personality disorder NOS, RLS, polycythemia, PVD, acute on chronic hypoxic hypercapnic respiratory failure.     4/27/2017 - Annual follow-up.  She feels that she continues to do very well with her CPAP and has no sleep concerns today.  CPAP download from 3/26/2017 - 4/24/2017 on auto-titrate 5-18 cm H2O.  Average daily usage of 7:11, used >= 4 hours on 93% of nights.  Pressure median 13.9 cm H2O, 95th%ile 17.1 cm H2O.  AHI 0.4.  A/P to continue CPAP auto-titrate 5-18 cm H2O.    Today - Returns for annual follow-up.  Concerns raised for residual apnea / hypoventilation given multiple hospitalizations for presumed COPD exacerbations on 10/28/2019 - 10/31/2019 (COPD exacerbation, acute hypoxic respiratory failure) and 11/8/2019 - 11/10/2019 (bronchitis, COPD exacerbation, diastolic heart failure).    She reports not using her CPAP for roughly the last year due to feeling it was suffocating.    Noted to have compensated respiratory acidosis on recent blood gases:  Results for OMKAR BILL (MRN [id number]) as of 12/10/2019 11:48   Ref. Range 10/29/2019 08:22 10/29/2019 12:08 10/30/2019 04:57 11/8/2019 16:18   FIO2 Unknown 40% 32 28 2L   Ph Venous Latest Ref Range: 7.32 - 7.43 pH 7.32 7.32 7.32 7.43   PCO2 Venous Latest Ref Range: 40 - 50 mm Hg 66 (H) 63 (H) 73 (H) 57 (H)   PO2 Venous Latest Ref Range: 25 - 47 mm Hg 44 41 37 23 (L)   Bicarbonate Venous Latest Ref Range: 21 - 28 mmol/L 34 (H) 32 (H) 37 (H) 38 (H)     Most recent PFT's:  4/16/2018 - spirometry, reduced FEV1 and FVC.    Most recent echo:  3/20/2018 - Normal LV size, mild concentric LVH, LVEF 60-65%.  Normal RV size / function / structure.     Past medical/surgical history, family history, social history, medications and allergies were  reviewed.       Prior Sleep Testin2010 - PSG with weight 209 lbs, AHI 15.7, RDI 48.3, marya SpO2 86%, PLM index of 114.1.    Problem List:  Patient Active Problem List    Diagnosis Date Noted     Bronchitis 2019     Priority: Medium     COPD exacerbation (H) 10/29/2019     Priority: Medium     Personal history of DVT (deep vein thrombosis) 10/29/2019     Priority: Medium     She had a DVT post pregnancy and delivery in .   3/26/2004: Hospitalized at [facility] for extensive left lower leg DVT.  This occurred a month after pneumonia episode and decreased activity.  She had lytic therapy, tPA followed by Possis mechanical thrombectomy device and three stents in veins.  She did have Groshong catheter at the time.  There was a positive lupus anticoagulant, negative Factor V and cardiolipin clement.        Acute respiratory failure with hypoxia and hypercapnia (H) 10/29/2019     Priority: Medium     Lymphedema of both lower extremities 10/29/2019     Priority: Medium     Cellulitis 2019     Priority: Medium     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 10/03/2018     Priority: Medium     Conductive hearing loss of left ear with restricted hearing of right ear 2018     Priority: Medium     Sensorineural hearing loss (SNHL) of right ear with restricted hearing of left ear 2018     Priority: Medium     Morbid obesity with alveolar hypoventilation (H) 2017     Priority: Medium     Cyst of left ovary 2016     Priority: Medium     PVD (peripheral vascular disease) with claudication (H) 10/30/2015     Priority: Medium     LEFT LE. STENT x 3 LEFT UPPER LEG       Morbid obesity (H) 2015     Priority: Medium     Vitamin D deficiency 2014     Priority: Medium     Problem list name updated by automated process. Provider to review       Sebaceous cyst of right axilla 10/14/2014     Priority: Medium     HTN, goal below 140/90 10/14/2014      Priority: Medium     Left leg pain 10/14/2014     Priority: Medium     History of stroke symptoms, stroke ruled out on imaging 07/10/2014     Priority: Medium     Admissions in 2011 and 2014 for stroke-like symptoms. Evaluated by neurology on both occasions, imaging not consistent with stroke. Thought to be due to conversion disorder in 2011.       Somatization disorder 07/10/2014     Priority: Medium     Benign neoplasm of colon (POLYPOSIS) 06/04/2014     Priority: Medium     Specimen #: [id number]   Collected: 11/7/2017   Received: 11/7/2017   Reported: 11/9/2017 19:59   Ordering Phy(s): CARMEL GALLOWAY     For improved result formatting, select 'View Enhanced Report Format'   under Linked Documents section.     SPECIMEN(S):   Colon polyp, 30 cm     FINAL DIAGNOSIS:   Colon at 30 cm, mucosal biopsy:   - Hyperplastic polyp, with focal changes suggestive of sessile serrated   adenoma.     Electronically signed out by:     Eric Rivas M.D.  = per Surveillance Recommendations:  Repeat in 5 yrs.    Notes Recorded by Moris Thomas MD on 4/25/2014 at 1:36 PM  Adenomatous polyp colonoscopy 5 years         DDD (degenerative disc disease), cervical 12/17/2013     Priority: Medium     Health Care Home 09/03/2013     Priority: Medium     Status:  Accepted  Care Coordinator:  Alla Fernandez    See Letters for [facility] Care Plan  Date:  October 20, 2014         Rosacea 08/26/2013     Priority: Medium     COPD (chronic obstructive pulmonary disease) (H) 08/23/2012     Priority: Medium     Pulmonary Function test by Sylvester Erickson MD on 9/13/2013 3:38 PM   IDENTIFICATION: Bia Bill is a 47-year-old female with obesity and a history of tobacco use.   RESULTS:   1. Spirometry: FEV1 moderately reduced. FVC moderately reduced. FEV1/FVC ratio normal.   2. Bronchodilator Response: No significant change is seen with bronchodilators.   INTERPRETATION: Moderate reduction in FEV1 and FVC with well-preserved FEV1/FVC ratio. This can  be seen in the setting of obstructive lung disease with air trapping or with restrictive lung disease. Clinical correlation is needed. Further testing with lung volumes and DLCO may be useful.   KACEY JHA MD          Moderate mixed bipolar I disorder (H) 10/13/2011     Priority: Medium     Problem list name updated by automated process. Provider to review       Personality disorder, depressive 10/13/2011     Priority: Medium     Erythrocytosis      Priority: Medium     Smoker 04/01/2011     Priority: Medium     3/2011  Not interested in quitting at this time.   Will consider and let us know how we can be of assistance.   Understands long term risks associated with same and increased risk of blood clot formation.        GILES (Obstructive Sleep Apnea)-Moderate (AHI 16) 03/01/2010     Priority: Medium     RLS (restless legs syndrome) 03/01/2010     Priority: Medium     Ferritin 11.       Fatty liver 08/28/2009     Priority: Medium     US on 8/26/2009        Severe episode of recurrent major depressive disorder (H) 11/03/2008     Priority: Medium     Hypothyroidism 05/13/2008     Priority: Medium     was hyperthyroid - Hashimoto's and graves and had iodine treatment done at [place] in early 2008.    Now hypothyroid - is following at [facility], on 125mcg levothyroxine - see scanned documents       Esophageal reflux 04/29/2008     Priority: Medium     Developmental reading disorder 09/12/2007     Priority: Medium     Problem list name updated by automated process. Provider to review       Chondromalacia of patella 02/27/2007     Priority: Medium     SECONDARY POLYCYTHEMIA      Priority: Medium     Betheda HGB, a high-oxygen affinity hemoglobin which results in a normal compensatory erythrocytosis.  There is no specific therapy needed.  I did order a hemoglobin electrophoresis today to help confirm this in Bia, as she tells me she has never had this test performed. Dr. Riley 1/17/07       Alcohol abuse, in  remission 08/01/2006     Priority: Medium     Quit [address] after court ordered, lost her children,        Mild persistent asthma 07/11/2006     Priority: Medium     Polyneuropathy in other diseases classified elsewhere (H) 07/11/2006     Priority: Medium     Has had chronic pain in the left groin and upper leg secondary to a blood clot in the thigh, treated with neurontin without benefit, still has chronic pain since 2005, sharp shooting intermittantly.    MRI/MRA 12/07 - [place] - see scanned documents   - had mild nonspecific white matter changes  Otherwise negative     MRI c- spine [place] 12/07 - no cervical cord abn.  Small disc protrusion C6-C7, no impingement       DVT (deep venous thrombosis) (H) 07/11/2006     Priority: Medium     She had a DVT post pregnancy and delivery in 1992.   3/26/2004:Hospitalized at [facility] for extensive left lower leg DVT.  This occurred a month after pneumonia episode and decreased activity.  She had lytic therapy, tPA followed by Possis mechanical thrombectomy device and three stents in veins.  She did have Groshong catheter at the time.  There was a positive lupus anticoagulant, negative Factor V and cardiolipin clement.           Impression/Plan:    1.)  History of moderate to low severe GILES  2.)  Acute on chronic hypoxic hypercapnic respiratory failure   - Suspect that GILES severity has worsened with interim 20-25 lb weight gain and that CPAP is likely not the appropriate therapy given evidence of acute on chronic hypoxic hypercapnic respiratory failure.   - Plan for all-night bilevel PAP titration PSG with TCM and pre-study VBG at stable state.    She will follow up with me in about 1 month(s).     Twenty-five minutes spent with patient, all of which were spent face-to-face counseling, consulting, coordinating plan of care.      Patel Bazzi MD    CC: Kd Leong

## 2019-12-10 NOTE — ED NOTES
Patient has  [place] to Observation  order. Patient has been given Observation brochure  What does Observation mean to me .  Patient has been given the opportunity to ask questions about observation status and their plan of care.  Milla Weaver RN

## 2019-12-10 NOTE — PROGRESS NOTES
"[facility] ADMISSION NOTE    Patient admitted to room 2400 at approximately 1441 via cart from emergency room. Patient was accompanied by transport tech.     Verbal SBAR report received from TAWANA Glez prior to patient arrival.     Patient ambulated to bed independently. Patient alert and oriented X 3. Pain is not well controlled.  Medication(s) being used: none. 0-10 Pain Scale: 9. Admission vital signs: Blood pressure (!) 144/53, pulse 86, temperature 96.6  F (35.9  C), temperature source Oral, resp. rate 16, height 1.473 m (4' 10\"), weight 101.9 kg (224 lb 10.4 oz), last menstrual period 09/27/2009, SpO2 96 %, not currently breastfeeding. Patient was oriented to plan of care, call light, bed controls, tv, telephone, bathroom and visiting hours.     Risk Assessment    The following safety risks were identified during admission: skin. Yellow risk band applied: NO.     Skin Initial Assessment    This writer admitted this patient and completed a full skin assessment and Brandon score in the Adult PCS flowsheet. Appropriate interventions initiated as needed.     Secondary skin check completed by TAWANA Grande.    Brandon Risk Assessment  Sensory Perception: 4-->no impairment  Moisture: 3-->occasionally moist  Activity: 3-->walks occasionally  Mobility: 3-->slightly limited  Nutrition: 4-->excellent  Friction and Shear: 3-->no apparent problem  Brandon Score: 20  Brandon Intervention(s) Implemented: draw sheets, encouraged fluids, incontinence care, HOB elevated 30 degrees or less, patient /family education on pressure injury prevention, pillows between bony prominences    Education    Patient has a [place] to Observation order: Yes  Observation education completed and documented: Yes      Terrie Holguin RN    "

## 2019-12-10 NOTE — LETTER
Transition Communication Hand-off for Care Transitions to Next Level of Care Provider    Name: Bia Bill  : 1965  MRN #: [id number]  Primary Care Provider: Kd Leong  Primary Care MD Name: (Dang)  [facility] [address]  Primary Care Clinic Name: ([facility])  Reason for Hospitalization:  Chills [R68.83]  Lymphedema of lower extremity [I89.0]  Bilateral lower extremity edema [R60.0]  Admit Date/Time: 12/10/2019 11:38 AM  Discharge Date: 19  Payor Source: Payor: [employer] / Plan: [employer] CONNECT [place] ONLY / Product Type: HMO /     Readmission Assessment Measure (JUDI) Risk Score/category: elevated         Reason for Communication Hand-off Referral: Other elevated JUDI    Discharge Plan:       Concern for non-adherence with plan of care:   Y/N no  Follow-up plan:    Future Appointments   Date Time Provider Department Center   2019 11:00 AM Cassia Montoya PTA [facility]   2019  9:30 AM Kd Morris [facility]   2019 10:40 AM [facility]   2019 11:15 AM Joshua Watts MD [facility]   2019 11:30 AM Joseline Santana, PT [facility]   2019 10:15 AM Cassia Montoya PTA [facility]   1/3/2020  9:40 AM [facility]   2020  8:00 PM PH BED 1 [facility]   2020  4:00 PM Patel Bazzi MD [facility]       [place] Recommendations:  Pt will most likely discharge tomorrow. She lives at home alone, elevated JUDI score. Home care referral placed with [facility] ([phone] Fax: [phone]).    MATTY Becerra   [facility] [phone]        AVS/Discharge Summary is the source of truth; this is a helpful guide for improved communication of patient story

## 2019-12-11 LAB
ANION GAP SERPL CALCULATED.3IONS-SCNC: <1 MMOL/L (ref 3–14)
BASOPHILS # BLD AUTO: 0 10E9/L (ref 0–0.2)
BASOPHILS NFR BLD AUTO: 0.5 %
BUN SERPL-MCNC: 10 MG/DL (ref 7–30)
CALCIUM SERPL-MCNC: 8.9 MG/DL (ref 8.5–10.1)
CHLORIDE SERPL-SCNC: 104 MMOL/L (ref 94–109)
CO2 SERPL-SCNC: 36 MMOL/L (ref 20–32)
CREAT SERPL-MCNC: 0.75 MG/DL (ref 0.52–1.04)
CRP SERPL-MCNC: 19.6 MG/L (ref 0–8)
DIFFERENTIAL METHOD BLD: ABNORMAL
EOSINOPHIL # BLD AUTO: 0 10E9/L (ref 0–0.7)
EOSINOPHIL NFR BLD AUTO: 1 %
ERYTHROCYTE [DISTWIDTH] IN BLOOD BY AUTOMATED COUNT: 21 % (ref 10–15)
GFR SERPL CREATININE-BSD FRML MDRD: >90 ML/MIN/{1.73_M2}
GLUCOSE BLDC GLUCOMTR-MCNC: 155 MG/DL (ref 70–99)
GLUCOSE BLDC GLUCOMTR-MCNC: 165 MG/DL (ref 70–99)
GLUCOSE BLDC GLUCOMTR-MCNC: 180 MG/DL (ref 70–99)
GLUCOSE BLDC GLUCOMTR-MCNC: 204 MG/DL (ref 70–99)
GLUCOSE BLDC GLUCOMTR-MCNC: 231 MG/DL (ref 70–99)
GLUCOSE SERPL-MCNC: 182 MG/DL (ref 70–99)
HCT VFR BLD AUTO: 46.9 % (ref 35–47)
HGB BLD-MCNC: 13 G/DL (ref 11.7–15.7)
IMM GRANULOCYTES # BLD: 0 10E9/L (ref 0–0.4)
IMM GRANULOCYTES NFR BLD: 0.2 %
INR PPP: 4.36 (ref 0.86–1.14)
LYMPHOCYTES # BLD AUTO: 1.5 10E9/L (ref 0.8–5.3)
LYMPHOCYTES NFR BLD AUTO: 36.5 %
MCH RBC QN AUTO: 24.5 PG (ref 26.5–33)
MCHC RBC AUTO-ENTMCNC: 27.7 G/DL (ref 31.5–36.5)
MCV RBC AUTO: 88 FL (ref 78–100)
MONOCYTES # BLD AUTO: 0.3 10E9/L (ref 0–1.3)
MONOCYTES NFR BLD AUTO: 7.6 %
NEUTROPHILS # BLD AUTO: 2.2 10E9/L (ref 1.6–8.3)
NEUTROPHILS NFR BLD AUTO: 54.2 %
NRBC # BLD AUTO: 0 10*3/UL
NRBC BLD AUTO-RTO: 1 /100
PLATELET # BLD AUTO: 128 10E9/L (ref 150–450)
POTASSIUM SERPL-SCNC: 4.2 MMOL/L (ref 3.4–5.3)
RBC # BLD AUTO: 5.31 10E12/L (ref 3.8–5.2)
SODIUM SERPL-SCNC: 140 MMOL/L (ref 133–144)
WBC # BLD AUTO: 4.1 10E9/L (ref 4–11)

## 2019-12-11 PROCEDURE — 85610 PROTHROMBIN TIME: CPT | Performed by: PHYSICIAN ASSISTANT

## 2019-12-11 PROCEDURE — 86140 C-REACTIVE PROTEIN: CPT | Performed by: PHYSICIAN ASSISTANT

## 2019-12-11 PROCEDURE — 96372 THER/PROPH/DIAG INJ SC/IM: CPT

## 2019-12-11 PROCEDURE — 25000132 ZZH RX MED GY IP 250 OP 250 PS 637: Performed by: INTERNAL MEDICINE

## 2019-12-11 PROCEDURE — G0378 HOSPITAL OBSERVATION PER HR: HCPCS

## 2019-12-11 PROCEDURE — 25000132 ZZH RX MED GY IP 250 OP 250 PS 637: Performed by: PHYSICIAN ASSISTANT

## 2019-12-11 PROCEDURE — 36415 COLL VENOUS BLD VENIPUNCTURE: CPT | Performed by: PHYSICIAN ASSISTANT

## 2019-12-11 PROCEDURE — 25000125 ZZHC RX 250: Performed by: PHYSICIAN ASSISTANT

## 2019-12-11 PROCEDURE — 99225 ZZC SUBSEQUENT OBSERVATION CARE,LEVEL II: CPT | Performed by: INTERNAL MEDICINE

## 2019-12-11 PROCEDURE — 99207 ZZC CDG-CODE CATEGORY CHANGED: CPT | Performed by: INTERNAL MEDICINE

## 2019-12-11 PROCEDURE — 85025 COMPLETE CBC W/AUTO DIFF WBC: CPT | Performed by: PHYSICIAN ASSISTANT

## 2019-12-11 PROCEDURE — 80048 BASIC METABOLIC PNL TOTAL CA: CPT | Performed by: PHYSICIAN ASSISTANT

## 2019-12-11 PROCEDURE — 00000146 ZZHCL STATISTIC GLUCOSE BY METER IP

## 2019-12-11 PROCEDURE — 96376 TX/PRO/DX INJ SAME DRUG ADON: CPT

## 2019-12-11 RX ORDER — PRAMIPEXOLE DIHYDROCHLORIDE 0.12 MG/1
.125-.25 TABLET ORAL AT BEDTIME
Status: DISCONTINUED | OUTPATIENT
Start: 2019-12-11 | End: 2019-12-12 | Stop reason: HOSPADM

## 2019-12-11 RX ORDER — TRAZODONE HYDROCHLORIDE 150 MG/1
TABLET ORAL
Qty: 90 TABLET | Refills: 1 | Status: SHIPPED | OUTPATIENT
Start: 2019-12-11 | End: 2020-05-26

## 2019-12-11 RX ADMIN — FUROSEMIDE 40 MG: 40 TABLET ORAL at 16:38

## 2019-12-11 RX ADMIN — CLINDAMYCIN PHOSPHATE 900 MG: 900 INJECTION, SOLUTION INTRAVENOUS at 13:04

## 2019-12-11 RX ADMIN — ACETAMINOPHEN 650 MG: 325 TABLET, FILM COATED ORAL at 02:31

## 2019-12-11 RX ADMIN — GABAPENTIN 600 MG: 600 TABLET, FILM COATED ORAL at 16:37

## 2019-12-11 RX ADMIN — ALLOPURINOL 300 MG: 300 TABLET ORAL at 08:37

## 2019-12-11 RX ADMIN — GABAPENTIN 600 MG: 600 TABLET, FILM COATED ORAL at 08:37

## 2019-12-11 RX ADMIN — POTASSIUM CHLORIDE 20 MEQ: 1.5 POWDER, FOR SOLUTION ORAL at 02:31

## 2019-12-11 RX ADMIN — BUPROPION HYDROCHLORIDE 150 MG: 150 TABLET, FILM COATED, EXTENDED RELEASE ORAL at 21:46

## 2019-12-11 RX ADMIN — POTASSIUM CHLORIDE 10 MEQ: 750 TABLET, FILM COATED, EXTENDED RELEASE ORAL at 08:37

## 2019-12-11 RX ADMIN — LEVOTHYROXINE SODIUM 175 MCG: 150 TABLET ORAL at 06:49

## 2019-12-11 RX ADMIN — GABAPENTIN 300 MG: 300 CAPSULE ORAL at 21:38

## 2019-12-11 RX ADMIN — OMEPRAZOLE 20 MG: 20 CAPSULE, DELAYED RELEASE ORAL at 21:39

## 2019-12-11 RX ADMIN — PRAMIPEXOLE DIHYDROCHLORIDE 0.12 MG: 0.12 TABLET ORAL at 21:38

## 2019-12-11 RX ADMIN — OMEPRAZOLE 20 MG: 20 CAPSULE, DELAYED RELEASE ORAL at 08:37

## 2019-12-11 RX ADMIN — INSULIN ASPART 2 UNITS: 100 INJECTION, SOLUTION INTRAVENOUS; SUBCUTANEOUS at 12:28

## 2019-12-11 RX ADMIN — FUROSEMIDE 60 MG: 40 TABLET ORAL at 08:37

## 2019-12-11 RX ADMIN — INSULIN ASPART 1 UNITS: 100 INJECTION, SOLUTION INTRAVENOUS; SUBCUTANEOUS at 08:37

## 2019-12-11 RX ADMIN — CLINDAMYCIN PHOSPHATE 900 MG: 900 INJECTION, SOLUTION INTRAVENOUS at 05:23

## 2019-12-11 RX ADMIN — POTASSIUM CHLORIDE 40 MEQ: 1.5 POWDER, FOR SOLUTION ORAL at 00:05

## 2019-12-11 RX ADMIN — ARIPIPRAZOLE 10 MG: 10 TABLET ORAL at 21:46

## 2019-12-11 RX ADMIN — SIMVASTATIN 20 MG: 20 TABLET, FILM COATED ORAL at 21:38

## 2019-12-11 RX ADMIN — TRAZODONE HYDROCHLORIDE 150 MG: 50 TABLET ORAL at 21:38

## 2019-12-11 RX ADMIN — GABAPENTIN 600 MG: 600 TABLET, FILM COATED ORAL at 21:38

## 2019-12-11 RX ADMIN — LAMOTRIGINE 100 MG: 100 TABLET ORAL at 08:37

## 2019-12-11 RX ADMIN — FLUTICASONE FUROATE AND VILANTEROL TRIFENATATE 1 PUFF: 200; 25 POWDER RESPIRATORY (INHALATION) at 08:37

## 2019-12-11 RX ADMIN — CLINDAMYCIN PHOSPHATE 900 MG: 900 INJECTION, SOLUTION INTRAVENOUS at 21:46

## 2019-12-11 RX ADMIN — INSULIN ASPART 2 UNITS: 100 INJECTION, SOLUTION INTRAVENOUS; SUBCUTANEOUS at 17:58

## 2019-12-11 ASSESSMENT — MIFFLIN-ST. JEOR: SCORE: 1465.75

## 2019-12-11 NOTE — PROGRESS NOTES
[facility]    Hospitalist Progress Note    Date of Service (when I saw the patient): 12/11/2019    Assessment & Plan   Bia Bill is a 54 year old female who was admitted on 12/10/2019 for evaluation and treatment of a bilateral popliteal rash thought to be cellulitis.     Rash, presumed cellulitis  Visually appears to be consistent with cellulitis. However, patient is afebrile, no leukocytosis. Rash is on bilateral lower extremities. Failed outpatient therapy with Ancef x 1, then Keflex (admission day is day 7 of 10), no improvement. Unclear etiology for rash although possibly cellulitis, was given Ancef in the emergency department.    - Started on clindamycin on admission.  - Rash is clinically improved on 12/11  - Continue IV clindamycin  - Blood culture NGTD     Elevated CRP   Admit CRP 23.8. Unclear cause - has noted history of Lupus anticoagulant in chart, although possibly related to rash/cellulitis.  - Treat cellulitis as above  - CRP improving with antibiotics     COPD (chronic obstructive pulmonary disease)  Mild persistent asthma  COPD and asthma at baseline (PFT's 2013 showing obstructive lung disease with air trapping vs restrictive lung disease). Managed prior to admission with Advair 230-21 bid and prn albuterol. Stable.  - Continue prior to admission Advair  - Prn albuterol nebs available     Lymphedema, chronic  Patient has diagnosis of lymphedema, but no formal diagnosis for heart failure. Echo March 2018 with mild LV hypertrophy, EF 60-65%. Lymphedema managed prior to admission with lasix 60 mg q am / 40 mg q pm. Edema is reportedly stable compared to baseline.   - Continue prior to admission lasix (60 mg am/40 mg pm)  - Lymphedema wraps as able     Type 2 diabetes mellitus   Most recent A1c 6.1 (10/29/19), diet controlled, not on glycemic control agents prior to admission.  - consistent carbohydrate diet  - Sliding scale insulin  - Hyper/hypoglycemia  protocol     Personal history of DVT (deep venous thrombosis)  Long term use of anticoagulant therapy  DVT postpartum 1992 and again in 2004 due to decreased mobility. Managed prior to admission with coumadin, INR goal 2-3. Admit INR 4.82. Low concern for DVT/PE by exam/history.  - Pharmacy to manage coumadin dosing  - Supratherapeutic INR, but no need to reverse, will allow to drift  - PCDs ordered     SECONDARY POLYCYTHEMIA   Follows with Dr. Watts, has upcoming visit. Is following CBC monthly, treated with prn phlebotomy. Has not needed phlebotomy recently. Admit hematocrit 48.4.  - Continue with outpatient management     Hypertension   Managed prior to admission with lasix dosed as above.   - Continue prior to admission lasix     PVD (peripheral vascular disease) with claudication  History of left lower extremity stents x 3. Is on Zocor 20 mg q hs.  - Continue Zocor    Polyneuropathy in other diseases classified elsewhere  RLS (restless legs syndrome)  Stable. Managed prior to admission with gabapentin (600 mg tid, 900 mg q hs) and Mirapex 0.125 mg q hs.  - Continue prior to admission Mirapex and gabapentin     Esophageal reflux  Managed prior to admission with omeprazole 20 mg bid, continue.     Hypothyroidism  Managed prior to admission with levothyroxine 150 mcg daily, continue.     Moderate mixed bipolar I disorder  Personality disorder, depressive  Stable mood. Managed prior to admission with Ability 10 mg q pm, Wellbutrin  mg q pm, Lamictal 100 mg daily, and trazodone 150 mg q hs.  - Continue prior to admission Abilify, Wellbutrin, trazodone, and Lamictal     Chronic gout  No evidence of gout on exam. Managed prior to admission with allopurinol 300 mg daily, continue.     GILES (Obstructive Sleep Apnea)-Moderate (AHI 16)  Patient formerly used a CPAP, but was told to stop using it until she could go in for another sleep study.  - CPAP use on hold, not continued during hospitalization     Morbid  obesity with alveolar hypoventilation  BMI 46.95. Contributes to overall morbidity and mortality, as well as impaired respiratory status.     Diet: Regular Diet Adult    DVT Prophylaxis: Warfarin  Goldsmith Catheter: not present  Code Status: Full - discussed with patient on admission    Disposition: Expected discharge in 1-2 days.    Rylan Seeian Farzana  Text Page (7 am to 6 pm)    Interval History   The patient is feeling better.  Her legs are less tender to exam. She denies fevers or chills.    -Data reviewed today: I reviewed all new labs and imaging results over the last 24 hours. I personally reviewed no images or EKG's today.    Physical Exam   Temp: 97.9  F (36.6  C) Temp src: Oral BP: 102/44 Pulse: 74 Heart Rate: 78 Resp: 18 SpO2: 97 % O2 Device: None (Room air) Oxygen Delivery: 2 LPM  Vitals:    12/10/19 1137 12/10/19 1444 12/11/19 0648   Weight: 110.2 kg (243 lb) 101.9 kg (224 lb 10.4 oz) 97.1 kg (214 lb 1.1 oz)     Vital Signs with Ranges  Temp:  [97.6  F (36.4  C)-98.6  F (37  C)] 97.9  F (36.6  C)  Pulse:  [74] 74  Heart Rate:  [78-86] 78  Resp:  [18-20] 18  BP: (102-127)/(44-56) 102/44  SpO2:  [86 %-98 %] 97 %  I/O last 3 completed shifts:  In: 225 [P.O.:150; I.V.:75]  Out: -     Gen: Obese female, alert and oriented x 3, no acute distressed  HEENT: Atraumatic, normocephalic  Lungs: Clear to ausculation without wheezes, rhonchi, or rales  Heart: Regular rate and rhythm, no gallops or rubs, no murmurs  GI: Bowel sound normal, no hepatosplenomegaly or masses  Lymph: No lymphadenopathy, 1 + BLE edema  Skin: Erythema and induration in bilateral inner thighs, improved from admission    Medications     Warfarin Therapy Reminder         allopurinol  300 mg Oral Daily     ARIPiprazole  10 mg Oral QPM     buPROPion  150 mg Oral QPM     clindamycin  900 mg Intravenous Q8H     fluticasone-vilanterol  1 puff Inhalation Daily     furosemide  60 mg Oral Daily    And     furosemide  40 mg Oral QPM     gabapentin  300 mg  Oral At Bedtime     gabapentin  600 mg Oral TID     insulin aspart  1-7 Units Subcutaneous TID AC     insulin aspart  1-5 Units Subcutaneous At Bedtime     lamoTRIgine  100 mg Oral Daily     levothyroxine  175 mcg Oral QAM AC     omeprazole  20 mg Oral BID     potassium chloride ER  10 mEq Oral Daily     simvastatin  20 mg Oral At Bedtime     sodium chloride (PF)  3 mL Intracatheter Q8H     traZODone  150 mg Oral At Bedtime     warfarin-No DOSE today  1 each Does not apply no dose today (warfarin)       Data   Recent Labs   Lab 12/11/19  0518 12/10/19  2214 12/10/19  1205 12/10/19  1116   WBC 4.1  --  4.8  --    HGB 13.0  --  14.0  --    MCV 88  --  87  --    *  --  168  --    INR 4.36*  --   --  4.82*     --  139  --    POTASSIUM 4.2 3.3* 2.9*  --    CHLORIDE 104  --  100  --    CO2 36*  --  37*  --    BUN 10  --  9  --    CR 0.75  --  0.72  --    ANIONGAP <1*  --  2*  --    HOOD 8.9  --  8.7  --    *  --  172*  --    ALBUMIN  --   --  3.1*  --    PROTTOTAL  --   --  6.7*  --    BILITOTAL  --   --  0.4  --    ALKPHOS  --   --  160*  --    ALT  --   --  27  --    AST  --   --  19  --        Recent Results (from the past 24 hour(s))   US Lower Extremity Venous Duplex Bilateral    Narrative    BILATERAL LOWER EXTREMITY VENOUS DUPLEX ULTRASOUND   12/10/2019 5:34  PM     HISTORY: Lymphedema, cellulitis.    COMPARISON: None.    FINDINGS: Gray-scale, color and Doppler spectral analysis ultrasound  was performed of the bilateral legs. Compression and augmentation  imaging was performed.    There is no evidence for deep venous thrombosis.      Impression    IMPRESSION: No evidence for DVT within either leg.    GIGI GARCIA MD

## 2019-12-11 NOTE — CONSULTS
CARE TRANSITION SOCIAL WORK INITIAL ASSESSMENT:      Met with: Patient.    DATA  Principal Problem:    Rash  Active Problems:    Mild persistent asthma    Polyneuropathy in other diseases classified elsewhere (H)    DVT (deep venous thrombosis) (H)    SECONDARY POLYCYTHEMIA    Esophageal reflux    Hypothyroidism    GILES (Obstructive Sleep Apnea)-Moderate (AHI 16)    RLS (restless legs syndrome)    Moderate mixed bipolar I disorder (H)    Personality disorder, depressive    COPD (chronic obstructive pulmonary disease) (H)    HTN, goal below 140/90    PVD (peripheral vascular disease) with claudication (H)    Morbid obesity with alveolar hypoventilation (H)    Type 2 diabetes mellitus (H)    Bilateral edema of lower extremity    Skin infection       Primary Care Clinic Name: ([facility])  Primary Care MD Name: (Dang)  Contact information and PCP information verified: Yes      ASSESSMENT  Cognitive Status: awake, alert and oriented.       Resources List: Home Care              Description of Support System: Supportive, Involved   Who is your support system?: Children, Significant Other   Support Assessment: Adequate family and caregiver support, Adequate social supports   Insurance Concerns: No Insurance issues identified           This writer met with pt introduced self and role. Discussed discharge planning and medicare guidelines in regards to home care and SNF benefits. Pt/family was given the Medicare Compare list for Home Care, with associated star ratings to assist with choice for referrals/discharge planning Yes    Education was given to pt/family that star ratings are updated/maintained by Medicare and can be reviewed by visiting www.medicare.gov Yes    This writer met with pt in regards to discharge plan. Pt reports that she lives at home alone. Pt stated that she is interested in home care on discharge. Pt was provided with Medicare certified home care list. Pt chooses to use [facility]  ([phone] Fax: [phone]).  Referral placed for RN.     Pt indicated she will make her own follow up appt with PCP.    As a system [facility] wants to ensure that across the care continuum that you have support through care coordination services that include nurses and social workers in the outpatient setting.  Due to your rash I feel it is important you have this support when you discharge.  They will be calling you within 24-48 hours of your discharge.   A brochure describing the services was provided.  If you have questions you can reach out to your clinic directly and ask for the Care Coordinator assigned to your care.    PLAN    Home care        [place], [facility], [facility] [address] [phone]          HOME CARE HAND OFF  Patient Name: Bia Bill    MRN: [id number]    : 1965    Patient Zip Code: [address]    Admit Diagnosis: Chills [R68.83]  Lymphedema of lower extremity [I89.0]  Bilateral lower extremity edema [R60.0]      Services Pt Needs at Home: RN    Discharge Support: Family/Friend Support    Living Arrangements: Alone     or Address Other Than Pt: No    Wound Care: No    Anticipate DC Date: 2019

## 2019-12-11 NOTE — DISCHARGE INSTRUCTIONS
As a system [place] wants to ensure that across the care continuum that you have support through care coordination services that include nurses and social workers in the outpatient setting.  Due to your rash I feel it is important you have this support when you discharge.  They will be calling you within 24-48 hours of your discharge.   A brochure describing the services was provided.  If you have questions you can reach out to your clinic directly and ask for the Care Coordinator assigned to your care

## 2019-12-11 NOTE — PLAN OF CARE
Alert and oriented up independently.   Rash red irritated in places, with in marked area.  Tylenol effective for pain control.  K+ replaced adequately.  Wearing oxygen at night due to desaturating saturation, she has sleep apnea and has cpap at home.

## 2019-12-11 NOTE — PLAN OF CARE
No change in rash, remains red warm to touch but not painful.  Borders are marked and receiving antibiotics as per order.  Potassium replacement completed and lab draw complete awaiting results.  Blood sugar tonight less than 200 so no coverage given at HS.  Patient states did receive good relief from leg pain with Tylenol given early evening.  Plan of care continues, monitor closely.

## 2019-12-11 NOTE — PLAN OF CARE
Patient up independently in her room. States pain in bilateral legs has significantly improved. Redness/swelling decreasing since admission. Left leg is less red/pink than right, and has receded more. Insulin administered for blood glucose of 155. Patient did need oxygen overnight, but o2 saturations 97% on room air this morning, so oxygen removed. Lungs clear/diminished. Patient cooperative with staff. Uses call light appropriately.

## 2019-12-12 ENCOUNTER — ANTICOAGULATION THERAPY VISIT (OUTPATIENT)
Dept: ANTICOAGULATION | Facility: CLINIC | Age: 54
End: 2019-12-12
Payer: COMMERCIAL

## 2019-12-12 VITALS
TEMPERATURE: 97.9 F | DIASTOLIC BLOOD PRESSURE: 44 MMHG | RESPIRATION RATE: 16 BRPM | WEIGHT: 211.64 LBS | HEART RATE: 76 BPM | BODY MASS INDEX: 44.43 KG/M2 | OXYGEN SATURATION: 94 % | SYSTOLIC BLOOD PRESSURE: 106 MMHG | HEIGHT: 58 IN

## 2019-12-12 DIAGNOSIS — I73.9 PVD (PERIPHERAL VASCULAR DISEASE) WITH CLAUDICATION (H): ICD-10-CM

## 2019-12-12 DIAGNOSIS — I82.409 DVT (DEEP VENOUS THROMBOSIS) (H): ICD-10-CM

## 2019-12-12 DIAGNOSIS — Z86.73 HISTORY OF STROKE: ICD-10-CM

## 2019-12-12 LAB
GLUCOSE BLDC GLUCOMTR-MCNC: 193 MG/DL (ref 70–99)
GLUCOSE BLDC GLUCOMTR-MCNC: 194 MG/DL (ref 70–99)
INR PPP: 2.27 (ref 0.86–1.14)

## 2019-12-12 PROCEDURE — 99207 ZZC NO CHARGE NURSE ONLY: CPT

## 2019-12-12 PROCEDURE — 25000132 ZZH RX MED GY IP 250 OP 250 PS 637: Performed by: PHYSICIAN ASSISTANT

## 2019-12-12 PROCEDURE — 96376 TX/PRO/DX INJ SAME DRUG ADON: CPT

## 2019-12-12 PROCEDURE — 25000125 ZZHC RX 250: Performed by: PHYSICIAN ASSISTANT

## 2019-12-12 PROCEDURE — 36415 COLL VENOUS BLD VENIPUNCTURE: CPT | Performed by: PHYSICIAN ASSISTANT

## 2019-12-12 PROCEDURE — 99207 ZZC CDG-CODE CATEGORY CHANGED: CPT | Performed by: FAMILY MEDICINE

## 2019-12-12 PROCEDURE — 25000132 ZZH RX MED GY IP 250 OP 250 PS 637: Performed by: INTERNAL MEDICINE

## 2019-12-12 PROCEDURE — 96372 THER/PROPH/DIAG INJ SC/IM: CPT

## 2019-12-12 PROCEDURE — G0378 HOSPITAL OBSERVATION PER HR: HCPCS

## 2019-12-12 PROCEDURE — 99217 ZZC OBSERVATION CARE DISCHARGE: CPT | Performed by: FAMILY MEDICINE

## 2019-12-12 PROCEDURE — 94640 AIRWAY INHALATION TREATMENT: CPT

## 2019-12-12 PROCEDURE — 85610 PROTHROMBIN TIME: CPT | Performed by: PHYSICIAN ASSISTANT

## 2019-12-12 PROCEDURE — 00000146 ZZHCL STATISTIC GLUCOSE BY METER IP

## 2019-12-12 RX ORDER — CLINDAMYCIN HCL 300 MG
300 CAPSULE ORAL 3 TIMES DAILY
Qty: 21 CAPSULE | Refills: 0 | Status: SHIPPED | OUTPATIENT
Start: 2019-12-12 | End: 2019-12-19

## 2019-12-12 RX ORDER — PREDNISONE 20 MG/1
TABLET ORAL
Qty: 12 TABLET | Refills: 0 | Status: ON HOLD | OUTPATIENT
Start: 2019-12-12 | End: 2020-01-23

## 2019-12-12 RX ADMIN — ALLOPURINOL 300 MG: 300 TABLET ORAL at 07:16

## 2019-12-12 RX ADMIN — OMEPRAZOLE 20 MG: 20 CAPSULE, DELAYED RELEASE ORAL at 07:15

## 2019-12-12 RX ADMIN — IPRATROPIUM BROMIDE AND ALBUTEROL SULFATE 3 ML: .5; 3 SOLUTION RESPIRATORY (INHALATION) at 01:22

## 2019-12-12 RX ADMIN — LAMOTRIGINE 100 MG: 100 TABLET ORAL at 07:18

## 2019-12-12 RX ADMIN — FUROSEMIDE 60 MG: 40 TABLET ORAL at 07:15

## 2019-12-12 RX ADMIN — ACETAMINOPHEN 650 MG: 325 TABLET, FILM COATED ORAL at 01:18

## 2019-12-12 RX ADMIN — POTASSIUM CHLORIDE 10 MEQ: 750 TABLET, FILM COATED, EXTENDED RELEASE ORAL at 07:16

## 2019-12-12 RX ADMIN — GABAPENTIN 600 MG: 600 TABLET, FILM COATED ORAL at 07:16

## 2019-12-12 RX ADMIN — ACETAMINOPHEN 650 MG: 325 TABLET, FILM COATED ORAL at 07:15

## 2019-12-12 RX ADMIN — CLINDAMYCIN PHOSPHATE 900 MG: 900 INJECTION, SOLUTION INTRAVENOUS at 06:02

## 2019-12-12 RX ADMIN — FLUTICASONE FUROATE AND VILANTEROL TRIFENATATE 1 PUFF: 200; 25 POWDER RESPIRATORY (INHALATION) at 07:19

## 2019-12-12 RX ADMIN — LEVOTHYROXINE SODIUM 175 MCG: 150 TABLET ORAL at 06:02

## 2019-12-12 RX ADMIN — INSULIN ASPART 2 UNITS: 100 INJECTION, SOLUTION INTRAVENOUS; SUBCUTANEOUS at 08:14

## 2019-12-12 ASSESSMENT — MIFFLIN-ST. JEOR: SCORE: 1454.75

## 2019-12-12 NOTE — PLAN OF CARE
[place] [facility] DISCHARGE NOTE    Patient discharged to home  AM via wheel chair. Accompanied by other:friend and staff. Discharge instructions reviewed with patient, opportunity offered to ask questions. Prescriptions sent with patient to fill . All belongings sent with patient.    Lotus Dinero RN

## 2019-12-12 NOTE — DISCHARGE SUMMARY
[place] Hospitalist Discharge Summary    Bia Bill MRN# [id number]   Age: 54 year old YOB: 1965     Date of Admission:  12/10/2019  Date of Discharge::  12/12/2019  9:45 AM  Admitting Physician:  Rylan Yanes MD  Discharge Physician:  Willie Heller MD  Primary Physician: Kd Leong       [place] clinic: [facility]               Discharge Diagnosis:   Principle diagnosis: bilateral cellulitis vs stasis dermatitis     Secondary diagnoses:  Lymphedema   GILES   Obesity    PVD  HTN   DM2    Discharge Instructions:   For the bilateral redness and swell of the medial upper thighs  This is perfectly symmetric and both legs are equally involved.  Although infection is a possibility it should be extremely rare that it is this symmetric.  It is notable that the redness starts at just above where the lymphedema wraps stop.  Therefore it could be a form of inflammatory change due to the stasis (swelling)  that might occur maximally at that point.   -would elevate the legs whenever sitting, try not to sit in a chair with the legs bent at 90 degrees downward  -would try some triamcinolone cream , rub in well 2 times/day   -will continue the clindamycin 300 mg 2 times/day as well for 7 days, incase this is indeed infection in both legs.    -would discuss with Lymphedema clinic any options for some compression that extends above the knees going forward.    -trying to lose weight would help the Lymphedema as well.      GILES  The nighttime oxygen desaturations highly suggest some obstructive sleep apnea.    -would  follow up with sleep study in the near future as scheduled.    No changes in your other meds.      Follow up with primary care provider in 7 days        Procedures:       Results for orders placed or performed during the hospital encounter of 12/10/19   US Lower Extremity Venous Duplex Bilateral    Narrative    BILATERAL LOWER EXTREMITY VENOUS DUPLEX ULTRASOUND   12/10/2019  5:34  PM     HISTORY: Lymphedema, cellulitis.    COMPARISON: None.    FINDINGS: Gray-scale, color and Doppler spectral analysis ultrasound  was performed of the bilateral legs. Compression and augmentation  imaging was performed.    There is no evidence for deep venous thrombosis.      Impression    IMPRESSION: No evidence for DVT within either leg.    GIGI GARCIA MD     *Note: Due to a large number of results and/or encounters for the requested time period, some results have not been displayed. A complete set of results can be found in Results Review.                    Allergies:      Allergies   Allergen Reactions     Darvocet [Propoxyphene N-Apap] Anaphylaxis     Darvocet,Percocet      Percocet [Oxycodone-Acetaminophen] Anaphylaxis, Hives and Swelling     Has tolerated hydromorphone in the past.      Asa [Aspirin] Hives     Aspirin causes seizures and hives, throat swelling.   Has tolerated ketorolac in the past.        Bee      Ibuprofen Swelling     Throat swelling per patient      Lyrica [Pregabalin] Other (See Comments) and Swelling     dizziness     Povidone Iodine Rash     Reaction to topical betadine     Tape [Adhesive Tape] Rash                  Discharge Medications:     Current Discharge Medication List      START taking these medications    Details   clindamycin (CLEOCIN) 300 MG capsule Take 1 capsule (300 mg) by mouth 3 times daily  Qty: 21 capsule, Refills: 0    Associated Diagnoses: Skin infection         CONTINUE these medications which have CHANGED    Details   predniSONE (DELTASONE) 20 MG tablet Take 2 tablets (40 mg) by mouth daily for 3 days, THEN 1 tablet (20 mg) daily for 4 days, THEN 0.5 tablets (10 mg) daily for 4 days.  Qty: 12 tablet, Refills: 0    Associated Diagnoses: Bronchitis         CONTINUE these medications which have NOT CHANGED    Details   acetaminophen (TYLENOL) 500 MG tablet Take 500-1,000 mg by mouth every 6 hours as needed for mild pain      albuterol (PROAIR HFA/PROVENTIL  HFA/VENTOLIN HFA) 108 (90 Base) MCG/ACT inhaler Inhale 2 puffs into the lungs every 6 hours as needed for shortness of breath / dyspnea or wheezing  Qty: 1 Inhaler, Refills: 11    Comments: Pharmacy may dispense brand covered by insurance (Proair, or proventil or ventolin or generic albuterol inhaler)  Associated Diagnoses: Mild persistent asthma without complication; Chronic obstructive pulmonary disease, unspecified COPD type (H)      albuterol (PROVENTIL) (2.5 MG/3ML) 0.083% neb solution Take 1 vial (2.5 mg) by nebulization every 6 hours as needed for shortness of breath / dyspnea or wheezing  Qty: 1 Box, Refills: 0    Comments: The patient requests that this prescription be held on file for filling in the near future.  Associated Diagnoses: Mild persistent asthma without complication; Chronic obstructive pulmonary disease, unspecified COPD type (H)      allopurinol (ZYLOPRIM) 300 MG tablet Take 1 tablet (300 mg) by mouth daily  Qty: 90 tablet, Refills: 3    Associated Diagnoses: Acute gouty arthritis      ARIPiprazole (ABILIFY) 10 MG tablet TAKE ONE TABLET BY MOUTH EVERY DAY  Qty: 90 tablet, Refills: 1    Associated Diagnoses: Major depressive disorder, recurrent episode, moderate (H)      buPROPion (WELLBUTRIN XL) 150 MG 24 hr tablet TAKE ONE TABLET BY MOUTH EVERY DAY IN THE EVENING  Qty: 150 tablet, Refills: 0    Associated Diagnoses: Moderate mixed bipolar I disorder (H)      fluticasone-salmeterol (ADVAIR HFA) 230-21 MCG/ACT inhaler Inhale 2 puffs into the lungs 2 times daily  Qty: 1 Inhaler, Refills: 3    Associated Diagnoses: Chronic obstructive pulmonary disease, unspecified COPD type (H)      furosemide (LASIX) 20 MG tablet 3 tab in am and 2 in pm.  Qty: 360 tablet, Refills: 3    Associated Diagnoses: Lymphedema of both lower extremities      gabapentin (NEURONTIN) 300 MG capsule TAKE ONE CAPSULE BY MOUTH ONCE DAILY AT BEDTIME WITH A 600 MG TABLET FOR A TOTAL DOSE  MG  Qty: 90 capsule, Refills: 0     Associated Diagnoses: Polyneuropathy in other diseases classified elsewhere (H)      gabapentin (NEURONTIN) 600 MG tablet TAKE ONE TABLET BY MOUTH THREE TIMES A DAY  Qty: 90 tablet, Refills: 3    Associated Diagnoses: Polyneuropathy in other diseases classified elsewhere (H)      ipratropium - albuterol 0.5 mg/2.5 mg/3 mL (DUONEB) 0.5-2.5 (3) MG/3ML neb solution Take 1 vial (3 mLs) by nebulization every 6 hours as needed for shortness of breath / dyspnea or wheezing  Qty: 30 vial, Refills: 11    Associated Diagnoses: Chronic obstructive pulmonary disease, unspecified COPD type (H)      lamoTRIgine (LAMICTAL) 100 MG tablet Take 1 tablet (100 mg) by mouth daily  Qty: 90 tablet, Refills: 3    Associated Diagnoses: Major depressive disorder, recurrent episode, moderate (H)      levothyroxine (SYNTHROID/LEVOTHROID) 175 MCG tablet Take 1 tablet (175 mcg) by mouth daily  Qty: 90 tablet, Refills: 0    Associated Diagnoses: Hypothyroidism, unspecified type      mometasone-formoterol (DULERA) 200-5 MCG/ACT oral inhaler Inhale 2 puffs into the lungs 2 times daily  Qty: 13 g, Refills: 1    Associated Diagnoses: COPD exacerbation (H); Chronic obstructive pulmonary disease, unspecified COPD type (H)      omeprazole (PRILOSEC) 20 MG DR capsule TAKE ONE CAPSULE BY MOUTH TWICE A DAY  Qty: 180 capsule, Refills: 0    Associated Diagnoses: Gastroesophageal reflux disease without esophagitis      !! order for DME Equipment being ordered:x2 [employer] 30/40mmHg compression wraps with x2 extra prs of compression liners  Qty: 1 each, Refills: 0    Associated Diagnoses: Secondary lymphedema      !! order for DME Equipment being ordered: CPAP  AIRSENSE 10  5-18 CM H20  SN# [id number]   DN# [id number]      potassium chloride ER (K-DUR/KLOR-CON M) 10 MEQ CR tablet TAKE ONE TABLET BY MOUTH ONCE DAILY  Qty: 90 tablet, Refills: 0    Associated Diagnoses: Lymphedema      pramipexole (MIRAPEX) 0.125 MG tablet TAKE 1-2 TABLETS BY MOUTH AT BEDTIME  Qty: 60  tablet, Refills: 10    Associated Diagnoses: Restless leg syndrome      simvastatin (ZOCOR) 20 MG tablet TAKE ONE TABLET BY MOUTH EVERY NIGHT AT BEDTIME  Qty: 90 tablet, Refills: 3    Associated Diagnoses: Hypercholesteremia      VITAMIN D3 1000 units tablet TAKE ONE TABLET BY MOUTH ONCE DAILY  Qty: 100 tablet, Refills: 2    Associated Diagnoses: Lymphedema of both lower extremities      warfarin ANTICOAGULANT (JANTOVEN ANTICOAGULANT) 5 MG tablet 12.5 mg (5 mg x 2.5) every Mon, Wed, Fri; 15 mg (5 mg x 3) all other days OR AS DIRECTED BY ACC  Qty: 240 tablet, Refills: 0    Associated Diagnoses: DVT (deep venous thrombosis) (H)      EPINEPHrine (EPIPEN/ADRENACLICK/OR ANY BX GENERIC EQUIV) 0.3 MG/0.3ML injection 2-pack Inject 0.3 mLs (0.3 mg) into the muscle once as needed for anaphylaxis  Qty: 0.6 mL, Refills: 0    Associated Diagnoses: Anaphylactic reaction to bee sting, undetermined intent, subsequent encounter      !! order for DME Equipment being ordered: Nebulizer  Qty: 1 Device, Refills: 0    Associated Diagnoses: COPD exacerbation (H); Chronic obstructive pulmonary disease, unspecified COPD type (H)      !! order for DME Equipment being ordered: DEPENDS SIZE LARGE  Qty: 60 Month, Refills: 5    Associated Diagnoses: Mixed incontinence      !! order for DME Equipment being ordered: INCONTINENCE PADS   QID  Qty: 1 Month, Refills: 11    Associated Diagnoses: Other urinary incontinence      traZODone (DESYREL) 150 MG tablet TAKE ONE TABLET BY MOUTH EVERY NIGHT AT BEDTIME  Qty: 90 tablet, Refills: 1    Associated Diagnoses: Major depressive disorder, recurrent episode, moderate (H)       !! - Potential duplicate medications found. Please discuss with provider.      STOP taking these medications       cephALEXin (KEFLEX) 500 MG capsule Comments:   Reason for Stopping:                     Consultations:   None           Brief History of Presenting Illness:   Bia Bill is a 54 year old female who presented to  "the emergency department for evaluation of a bilateral leg rash.     Rash was first noticed about 10 days ago, initially on the left leg near the knee. Since that time the rash has grown, now localized to a large area around anterior, medial, and posterior left leg. She has also developed a similar rash on the right leg near the knee, but not as large.      She was seen in the emergency department on 12/4 for the rash, was given 1 dose of Ancef and then discharged home with a 10 day prescription for Keflex 500 mg qid. She reports compliance with the medication. The rash never improved, only worsened. She returned to the emergency department for re-evaluation.     Rash is painful, described as a constant burning pain, as well as a \"pins and needles\" pain with any pressure or touch. There is no known injury, pruritis, wound, or any exudate associated with the rash. Using lotion on the wound is painful and stings. The pain has been keeping her up at night, she is not sleeping well.     She reports chills but no known fevers.      She has chronic lymphedema, but stable. She is able to tolerate use of her lymphedema wraps. She has a chronic cough that is unchanged from baseline. Since starting Keflex she reports new constipation, but no diarrhea. She also has a dry mouth, which is new.          Hospital Course:   Rash, presumed cellulitis  Visually appears to be consistent with cellulitis. However, patient is afebrile, no leukocytosis. Rash is on bilateral lower extremities.   This could also be related to the Lymphedema clinic wraps that come up to just below where these reddened areas start.    Some improvement while here but may also be the wraps are off and the legs are elevated.      - Started on clindamycin on admission.  - Blood culture NGTD     Elevated CRP   Admit CRP 23.8. Unclear cause - has noted history of Lupus anticoagulant in chart, although possibly related to rash/cellulitis.  - Treat cellulitis as " above  - CRP improving with antibiotics     COPD (chronic obstructive pulmonary disease)  Mild persistent asthma  COPD and asthma at baseline (PFT's 2013 showing obstructive lung disease with air trapping vs restrictive lung disease). Managed prior to admission with Advair 230-21 bid and prn albuterol. Stable.  - Continue prior to admission Advair  - Prn albuterol nebs available     Lymphedema, chronic  Patient has diagnosis of lymphedema, but no formal diagnosis for heart failure. Echo March 2018 with mild LV hypertrophy, EF 60-65%. Lymphedema managed prior to admission with lasix 60 mg q am / 40 mg q pm. Edema is reportedly stable compared to baseline.   - Continue prior to admission lasix (60 mg am/40 mg pm)  - Lymphedema wraps as able     Type 2 diabetes mellitus   Most recent A1c 6.1 (10/29/19), diet controlled, not on glycemic control agents prior to admission.  - consistent carbohydrate diet  - Sliding scale insulin  - Hyper/hypoglycemia protocol     Personal history of DVT (deep venous thrombosis)  Long term use of anticoagulant therapy  DVT postpartum 1992 and again in 2004 due to decreased mobility. Managed prior to admission with coumadin, INR goal 2-3. Admit INR 4.82. Low concern for DVT/PE by exam/history.  - Pharmacy to manage coumadin dosing  - Supratherapeutic INR, but no need to reverse, will allow to drift  - PCDs ordered     SECONDARY POLYCYTHEMIA   Follows with Dr. Watts, has upcoming visit. Is following CBC monthly, treated with prn phlebotomy. Has not needed phlebotomy recently. Admit hematocrit 48.4.  - Continue with outpatient management     Hypertension   Managed prior to admission with lasix dosed as above.   - Continue prior to admission lasix     PVD (peripheral vascular disease) with claudication  History of left lower extremity stents x 3. Is on Zocor 20 mg q hs.  - Continue Zocor     Polyneuropathy in other diseases classified elsewhere  RLS (restless legs syndrome)  Stable. Managed  "prior to admission with gabapentin (600 mg tid, 900 mg q hs) and Mirapex 0.125 mg q hs.  - Continue prior to admission Mirapex and gabapentin     Esophageal reflux  Managed prior to admission with omeprazole 20 mg bid, continue.     Hypothyroidism  Managed prior to admission with levothyroxine 150 mcg daily, continue.     Moderate mixed bipolar I disorder  Personality disorder, depressive  Stable mood. Managed prior to admission with Ability 10 mg q pm, Wellbutrin  mg q pm, Lamictal 100 mg daily, and trazodone 150 mg q hs.  - Continue prior to admission Abilify, Wellbutrin, trazodone, and Lamictal     Chronic gout  No evidence of gout on exam. Managed prior to admission with allopurinol 300 mg daily, continue.     GILES (Obstructive Sleep Apnea)-Moderate (AHI 16)  Patient formerly used a CPAP, but was told to stop using it until she could go in for another sleep study.  - CPAP use on hold, not continued during hospitalization     Morbid obesity with alveolar hypoventilation  BMI 46.95. Contributes to overall morbidity and mortality, as well as impaired respiratory status.     Diet: Regular Diet Adult    DVT Prophylaxis: Warfarin  Goldsmith Catheter: not present  Code Status: Full - discussed with patient on admission            Discharge Exam:   OBJECTIVE:   /44 (BP Location: Left arm)   Pulse 76   Temp 97.9  F (36.6  C) (Oral)   Resp 16   Ht 1.473 m (4' 10\")   Wt 96 kg (211 lb 10.3 oz)   LMP 09/27/2009   SpO2 94%   BMI 44.23 kg/m      GENERAL APPEARANCE:  Alert, NAD, Ox3     RESP:clear      CV: regular rates and rhythm,no murmur, no click or rub - no edema     Abdomen: soft, nontender, no liver or spleen enlargement, no masses, BSs normal   Skin: no cyanosis, pallor, or jaundice   Areas of exactly symmetric redness and some tenderness medial aspect just above both knees.             Pending Tests at Discharge:     Unresulted Labs Ordered in the Past 30 Days of this Admission     Date and Time Order " Name Status Description    12/10/2019 1214 Blood culture (one site) Preliminary     12/2/2019 1003 T4 FREE In process     11/26/2019 0948 T4 FREE In process                    Discharge Disposition:   Discharged to home      Attestation:  Amount of time performed on this discharge : 30  minutes.    Willie Heller MD MD

## 2019-12-12 NOTE — PROGRESS NOTES
"ANTICOAGULATION FOLLOW-UP CLINIC VISIT    Patient Name:  Bia Bill  Date:  12/12/2019  Contact Type:  Telephone/ spoke with Norah on phone    SUBJECTIVE:  Patient Findings     Positives:   Change in health, Change in medications (clindamycin and high dose prednisone taper), Hospital admission (12-10 to 12-12)    Comments:   Patient was on keflex for cellulitis after ED visit on 12-4. Patient then hospitalized 12-10 to 12-12 for bilateral cellulitis vs stasis dermatitis. Per notes \"This is perfectly symmetric and both legs are equally involved.  Although infection is a possibility it should be extremely rare that it is this symmetric.  It is notable that the redness starts at just above where the lymphedema wraps stop.  Therefore it could be a form of inflammatory change due to the stasis (swelling)  that might occur maximally at that point.\"    Keflex stopped at discharge. On clindamycin for 1 week and prednisone taper. Patient was on prednisone already but this has been increased. Due to high doses of prednisone and abx, writer has asked patient to recheck INR on 12-16 at lab. She will take 12.5 mg warfarin daily until then.         Clinical Outcomes     Comments:   Patient was on keflex for cellulitis after ED visit on 12-4. Patient then hospitalized 12-10 to 12-12 for bilateral cellulitis vs stasis dermatitis. Per notes \"This is perfectly symmetric and both legs are equally involved.  Although infection is a possibility it should be extremely rare that it is this symmetric.  It is notable that the redness starts at just above where the lymphedema wraps stop.  Therefore it could be a form of inflammatory change due to the stasis (swelling)  that might occur maximally at that point.\"    Keflex stopped at discharge. On clindamycin for 1 week and prednisone taper. Patient was on prednisone already but this has been increased. Due to high doses of prednisone and abx, writer has asked patient to recheck INR on " 12-16 at lab. She will take 12.5 mg warfarin daily until then.            OBJECTIVE    INR   Date Value Ref Range Status   12/12/2019 2.27 (H) 0.86 - 1.14 Final       ASSESSMENT / PLAN  INR assessment THER    Recheck INR In: 4 DAYS    INR Location Outside lab lab INR     Anticoagulation Summary  As of 12/12/2019    INR goal:   2.0-3.0   TTR:   30.4 % (11.2 mo)   INR used for dosing:      Warfarin maintenance plan:   12.5 mg (5 mg x 2.5) every Mon, Wed, Fri; 15 mg (5 mg x 3) all other days   Full warfarin instructions:   12/12: 12.5 mg; 12/14: 12.5 mg; 12/15: 12.5 mg; Otherwise 12.5 mg every Mon, Wed, Fri; 15 mg all other days   Weekly warfarin total:   97.5 mg   Plan last modified:   Reina Forrest RN (12/2/2019)   Next INR check:   12/16/2019   Priority:   Maintenance   Target end date:   Indefinite    Indications    PVD (peripheral vascular disease) with claudication (H) [I73.9]  Long term current use of anticoagulant therapy (Resolved) [Z79.01]  DVT (deep venous thrombosis) (H) [I82.409]  History of stroke symptoms  stroke ruled out on imaging [Z86.73]             Anticoagulation Episode Summary     INR check location:       Preferred lab:       Send INR reminders to:   [place] TATIANA [facility] POOL    Comments:   * lab draw due to elevated hematocrit (fingerstick meters don't work). LEFT LE. STENT x 3 LEFT UPPER LEG. Previous arterial clot. AS of 10/9/19 Goal range 2.0-3.0.      Anticoagulation Care Providers     Provider Role Specialty Phone number    Kd Leong MD [facility] [phone]            See the Encounter Report to view Anticoagulation Flowsheet and Dosing Calendar (Go to Encounters tab in chart review, and find the Anticoagulation Therapy Visit)        Mireille Torres RN

## 2019-12-12 NOTE — PLAN OF CARE
Duoneb given for SOB. Tylenol given for leg pain. Walked to end of hallway and back with some pain relief. Rested in recliner. Used call light appropriately.   Janice Baez RN on 12/12/2019 at 5:13 AM

## 2019-12-12 NOTE — PROGRESS NOTES
Care Transitions Discharge Note:    Name: Bia Bill    MRN: [id number]    Reason for Hospitalization: Chills [R68.83]  Lymphedema of lower extremity [I89.0]  Bilateral lower extremity edema [R60.0]    Cognitive/Behavioral Status: awake and alert    Follow-up Appointments:   Future Appointments   Date Time Provider Department Center   12/16/2019 11:00 AM Cassia Montoya PTA [facility]   12/18/2019  9:30 AM Kd Morris [facility]   12/18/2019 10:40 AM [facility]   12/18/2019 11:15 AM Joshua Watts MD [facility]   12/18/2019 11:30 AM Joseline Santana, PT [facility]   12/19/2019  2:20 PM Kd Leong MD [facility]   12/20/2019 10:15 AM Cassia Montoya PTA [facility]   1/3/2020  9:40 AM [facility]   1/6/2020  8:00 PM PH BED 1 [facility]   1/16/2020  4:00 PM Patel Bazzi MD CLS [facility]       Discharge Date:  12/12/2019    Patient/Care Partner in agreement and understands the discharge plan:  Yes    Discharge Disposition:  Home with [facility].    Discharge Planner   Discharge Plans in progress: [facility]  Barriers to discharge plan: None  Follow up plan: Follow up with PCP.       Entered by: Rhiannon Landon 12/12/2019 10:26 AM         Rhiannon Landon RN Care Coordinator  [phone]

## 2019-12-13 ENCOUNTER — PATIENT OUTREACH (OUTPATIENT)
Dept: CARE COORDINATION | Facility: CLINIC | Age: 54
End: 2019-12-13

## 2019-12-13 DIAGNOSIS — Z71.89 OTHER SPECIFIED COUNSELING: ICD-10-CM

## 2019-12-16 ENCOUNTER — HOSPITAL ENCOUNTER (OUTPATIENT)
Dept: PHYSICAL THERAPY | Facility: CLINIC | Age: 54
Setting detail: THERAPIES SERIES
End: 2019-12-16
Attending: FAMILY MEDICINE
Payer: COMMERCIAL

## 2019-12-16 ENCOUNTER — ANTICOAGULATION THERAPY VISIT (OUTPATIENT)
Dept: ANTICOAGULATION | Facility: CLINIC | Age: 54
End: 2019-12-16

## 2019-12-16 DIAGNOSIS — Z86.73 HISTORY OF STROKE: ICD-10-CM

## 2019-12-16 DIAGNOSIS — I82.409 DVT (DEEP VENOUS THROMBOSIS) (H): ICD-10-CM

## 2019-12-16 DIAGNOSIS — I73.9 PVD (PERIPHERAL VASCULAR DISEASE) WITH CLAUDICATION (H): ICD-10-CM

## 2019-12-16 DIAGNOSIS — Z79.01 LONG TERM CURRENT USE OF ANTICOAGULANT THERAPY: ICD-10-CM

## 2019-12-16 LAB
BACTERIA SPEC CULT: NO GROWTH
HCT VFR BLD AUTO: 49.2 % (ref 35–47)
INR PPP: 2.51 (ref 0.86–1.14)
Lab: NORMAL
SPECIMEN SOURCE: NORMAL

## 2019-12-16 PROCEDURE — 97140 MANUAL THERAPY 1/> REGIONS: CPT | Mod: GP | Performed by: REHABILITATION PRACTITIONER

## 2019-12-16 PROCEDURE — 85014 HEMATOCRIT: CPT | Performed by: FAMILY MEDICINE

## 2019-12-16 PROCEDURE — 85610 PROTHROMBIN TIME: CPT | Performed by: FAMILY MEDICINE

## 2019-12-16 PROCEDURE — 99207 ZZC NO CHARGE NURSE ONLY: CPT

## 2019-12-16 PROCEDURE — 36415 COLL VENOUS BLD VENIPUNCTURE: CPT | Performed by: FAMILY MEDICINE

## 2019-12-16 NOTE — PROGRESS NOTES
ANTICOAGULATION FOLLOW-UP CLINIC VISIT    Patient Name:  Bia Bill  Date:  2019  Contact Type:  Telephone/ Left DVM    SUBJECTIVE:  Patient Findings     Positives:   Missed doses, Change in medications (Clindamycin/Prednisone)    Comments:   Writer left DVM.  No changes in activity, health, or diet noted. No bleeding or increased bruising noted. Took warfarin as prescribed.  Writer instructed the patient to take 12.5 mg daily.   Recheck in 2 days.          Clinical Outcomes     Negatives:   Major bleeding event, Thromboembolic event, Anticoagulation-related hospital admission, Anticoagulation-related ED visit, Anticoagulation-related fatality    Comments:   Writer left DVM.  No changes in activity, health, or diet noted. No bleeding or increased bruising noted. Took warfarin as prescribed.  Writer instructed the patient to take 12.5 mg daily.   Recheck in 2 days.             OBJECTIVE    INR   Date Value Ref Range Status   2019 2.51 (H) 0.86 - 1.14 Final       ASSESSMENT / PLAN  INR assessment THER    Recheck INR In: 2 DAYS    INR Location Outside lab      Anticoagulation Summary  As of 2019    INR goal:   2.0-3.0   TTR:   31.6 % (11.2 mo)   INR used for dosin.51 (2019)   Warfarin maintenance plan:   12.5 mg (5 mg x 2.5) every Mon, Wed, Fri; 15 mg (5 mg x 3) all other days   Full warfarin instructions:   : 12.5 mg; Otherwise 12.5 mg every Mon, Wed, Fri; 15 mg all other days   Weekly warfarin total:   97.5 mg   Plan last modified:   Reina Forrest RN (2019)   Next INR check:   2019   Priority:   Critical   Target end date:   Indefinite    Indications    PVD (peripheral vascular disease) with claudication (H) [I73.9]  Long term current use of anticoagulant therapy (Resolved) [Z79.01]  DVT (deep venous thrombosis) (H) [I82.409]  History of stroke symptoms  stroke ruled out on imaging [Z86.73]             Anticoagulation Episode Summary     INR check location:        Preferred lab:       Send INR reminders to:   [place] PHONE ANTICOAG POOL    Comments:   * lab draw due to elevated hematocrit (fingerstick meters don't work). LEFT LE. STENT x 3 LEFT UPPER LEG. Previous arterial clot. AS of 10/9/19 Goal range 2.0-3.0.      Anticoagulation Care Providers     Provider Role Specialty Phone number    Kd Leong MD [facility] [phone]            See the Encounter Report to view Anticoagulation Flowsheet and Dosing Calendar (Go to Encounters tab in chart review, and find the Anticoagulation Therapy Visit)        Reina Forrest RN

## 2019-12-18 ENCOUNTER — ANTICOAGULATION THERAPY VISIT (OUTPATIENT)
Dept: ANTICOAGULATION | Facility: CLINIC | Age: 54
End: 2019-12-18
Payer: COMMERCIAL

## 2019-12-18 ENCOUNTER — ONCOLOGY VISIT (OUTPATIENT)
Dept: ONCOLOGY | Facility: CLINIC | Age: 54
End: 2019-12-18
Attending: INTERNAL MEDICINE
Payer: COMMERCIAL

## 2019-12-18 ENCOUNTER — OFFICE VISIT (OUTPATIENT)
Dept: PSYCHOLOGY | Facility: CLINIC | Age: 54
End: 2019-12-18
Payer: COMMERCIAL

## 2019-12-18 ENCOUNTER — HOSPITAL ENCOUNTER (OUTPATIENT)
Dept: LAB | Facility: CLINIC | Age: 54
Discharge: HOME | End: 2019-12-18
Attending: INTERNAL MEDICINE | Admitting: INTERNAL MEDICINE
Payer: COMMERCIAL

## 2019-12-18 VITALS
TEMPERATURE: 99 F | HEART RATE: 85 BPM | OXYGEN SATURATION: 94 % | DIASTOLIC BLOOD PRESSURE: 44 MMHG | WEIGHT: 242.1 LBS | SYSTOLIC BLOOD PRESSURE: 128 MMHG | RESPIRATION RATE: 18 BRPM | BODY MASS INDEX: 50.82 KG/M2 | HEIGHT: 58 IN

## 2019-12-18 DIAGNOSIS — I73.9 PVD (PERIPHERAL VASCULAR DISEASE) WITH CLAUDICATION (H): ICD-10-CM

## 2019-12-18 DIAGNOSIS — F31.62 MODERATE MIXED BIPOLAR I DISORDER (H): Primary | ICD-10-CM

## 2019-12-18 DIAGNOSIS — Z86.73 HISTORY OF STROKE: ICD-10-CM

## 2019-12-18 DIAGNOSIS — Z79.01 LONG TERM CURRENT USE OF ANTICOAGULANT THERAPY: ICD-10-CM

## 2019-12-18 DIAGNOSIS — I82.409 DVT (DEEP VENOUS THROMBOSIS) (H): ICD-10-CM

## 2019-12-18 DIAGNOSIS — D75.1 ERYTHROCYTOSIS: Primary | ICD-10-CM

## 2019-12-18 LAB
BASOPHILS # BLD AUTO: 0 10E9/L (ref 0–0.2)
BASOPHILS NFR BLD AUTO: 0.4 %
DIFFERENTIAL METHOD BLD: ABNORMAL
EOSINOPHIL # BLD AUTO: 0 10E9/L (ref 0–0.7)
EOSINOPHIL NFR BLD AUTO: 0.3 %
ERYTHROCYTE [DISTWIDTH] IN BLOOD BY AUTOMATED COUNT: 21.6 % (ref 10–15)
HCT VFR BLD AUTO: 51.5 % (ref 35–47)
HCT VFR BLD AUTO: 51.7 % (ref 35–47)
HGB BLD-MCNC: 14.2 G/DL (ref 11.7–15.7)
IMM GRANULOCYTES # BLD: 0 10E9/L (ref 0–0.4)
IMM GRANULOCYTES NFR BLD: 0.4 %
INR PPP: 2.53 (ref 0.86–1.14)
LYMPHOCYTES # BLD AUTO: 0.9 10E9/L (ref 0.8–5.3)
LYMPHOCYTES NFR BLD AUTO: 12.1 %
MCH RBC QN AUTO: 25 PG (ref 26.5–33)
MCHC RBC AUTO-ENTMCNC: 27.6 G/DL (ref 31.5–36.5)
MCV RBC AUTO: 91 FL (ref 78–100)
MONOCYTES # BLD AUTO: 0.3 10E9/L (ref 0–1.3)
MONOCYTES NFR BLD AUTO: 3.8 %
NEUTROPHILS # BLD AUTO: 6.2 10E9/L (ref 1.6–8.3)
NEUTROPHILS NFR BLD AUTO: 83 %
NRBC # BLD AUTO: 0 10*3/UL
NRBC BLD AUTO-RTO: 1 /100
PLATELET # BLD AUTO: 163 10E9/L (ref 150–450)
RBC # BLD AUTO: 5.69 10E12/L (ref 3.8–5.2)
WBC # BLD AUTO: 7.4 10E9/L (ref 4–11)

## 2019-12-18 PROCEDURE — 99207 ZZC NO CHARGE NURSE ONLY: CPT

## 2019-12-18 PROCEDURE — 36415 COLL VENOUS BLD VENIPUNCTURE: CPT | Performed by: FAMILY MEDICINE

## 2019-12-18 PROCEDURE — 85610 PROTHROMBIN TIME: CPT | Performed by: FAMILY MEDICINE

## 2019-12-18 PROCEDURE — 99213 OFFICE O/P EST LOW 20 MIN: CPT | Performed by: INTERNAL MEDICINE

## 2019-12-18 PROCEDURE — G0463 HOSPITAL OUTPT CLINIC VISIT: HCPCS

## 2019-12-18 PROCEDURE — 36415 COLL VENOUS BLD VENIPUNCTURE: CPT | Performed by: INTERNAL MEDICINE

## 2019-12-18 PROCEDURE — 90834 PSYTX W PT 45 MINUTES: CPT | Performed by: PSYCHOLOGIST

## 2019-12-18 PROCEDURE — 85014 HEMATOCRIT: CPT | Performed by: FAMILY MEDICINE

## 2019-12-18 PROCEDURE — 85025 COMPLETE CBC W/AUTO DIFF WBC: CPT | Performed by: INTERNAL MEDICINE

## 2019-12-18 RX ORDER — WARFARIN SODIUM 5 MG/1
TABLET ORAL
Qty: 240 TABLET | Refills: 0 | COMMUNITY
Start: 2019-12-18 | End: 2020-03-24

## 2019-12-18 ASSESSMENT — PAIN SCALES - GENERAL: PAINLEVEL: EXTREME PAIN (8)

## 2019-12-18 ASSESSMENT — MIFFLIN-ST. JEOR: SCORE: 1592.91

## 2019-12-18 NOTE — PROGRESS NOTES
ADDENDUM: Patient called to cancel her lab appt for -she states she has no transportation and will not have anything available until -patient's appt rescheduled for her. She will continue her maintenance dose until her next appointment. A new standing INR order was placed.     Janine Velasquez RN 19 at 2:08 PM          ANTICOAGULATION FOLLOW-UP CLINIC VISIT    Patient Name:  Bia Bill  Date:  2019  Contact Type:  Telephone    SUBJECTIVE:  Patient Findings     Positives:   Change in medications (Prednisone)    Comments:   No changes in activity, health, or diet noted. No bleeding or increased bruising noted. Took warfarin as prescribed.  Patient will continue on 12.25 mg daily.  Recheck INR in 1 week.   Patient verbalizes understanding and agrees to plan. No further questions or concerns.            Clinical Outcomes     Negatives:   Major bleeding event, Thromboembolic event, Anticoagulation-related hospital admission, Anticoagulation-related ED visit, Anticoagulation-related fatality    Comments:   No changes in activity, health, or diet noted. No bleeding or increased bruising noted. Took warfarin as prescribed.  Patient will continue on 12.25 mg daily.  Recheck INR in 1 week.   Patient verbalizes understanding and agrees to plan. No further questions or concerns.               OBJECTIVE    INR   Date Value Ref Range Status   2019 2.53 (H) 0.86 - 1.14 Final       ASSESSMENT / PLAN  INR assessment THER    Recheck INR In: 1 WEEK    INR Location Outside lab      Anticoagulation Summary  As of 2019    INR goal:   2.0-3.0   TTR:   32.2 % (11.2 mo)   INR used for dosin.53 (2019)   Warfarin maintenance plan:   12.5 mg (5 mg x 2.5) every day   Full warfarin instructions:   12.5 mg every day   Weekly warfarin total:   87.5 mg   Plan last modified:   Reina Forrest RN (2019)   Next INR check:   2019   Priority:   Critical   Target end date:    Indefinite    Indications    PVD (peripheral vascular disease) with claudication (H) [I73.9]  Long term current use of anticoagulant therapy (Resolved) [Z79.01]  DVT (deep venous thrombosis) (H) [I82.409]  History of stroke symptoms  stroke ruled out on imaging [Z86.73]             Anticoagulation Episode Summary     INR check location:       Preferred lab:       Send INR reminders to:   [place] PHONE [place] POOL    Comments:   * lab draw due to elevated hematocrit (fingerstick meters don't work). LEFT LE. STENT x 3 LEFT UPPER LEG. Previous arterial clot. AS of 10/9/19 Goal range 2.0-3.0.      Anticoagulation Care Providers     Provider Role Specialty Phone number    Kd Leong MD [facility] [phone]            See the Encounter Report to view Anticoagulation Flowsheet and Dosing Calendar (Go to Encounters tab in chart review, and find the Anticoagulation Therapy Visit)        Reina Forrest RN

## 2019-12-18 NOTE — PROGRESS NOTES
DATE OF VISIT: Dec 18, 2019    Bia Bill is a 54 year old female is seen today for   Chief Complaint   Patient presents with     Hematology     3 month recheck Erythrocytosis    .       (D75.1) Erythrocytosis  (primary encounter diagnosis)  I reviewed with the patient today most recent CBC.  Hematocrit is elevated at 51.  Patient remains asymptomatic.  I would recommend to continue to monitor patient hematocrit.  I encouraged the patient to use CPAP machine for her sleep apnea.  I will see the patient again in 6 months time.  We will check CBC periodically during this time.    The patient is ready to learn, no apparent learning barriers were identified.  Diagnosis and treatment plans were explained to the patient. The patient expressed understanding of the content. The patient asked appropriate questions. The patient questions were answered to her satisfaction.  Time spent 15 minutes more than 50% of the time in counseling and coordination of care including discussion of management of erythrocytosis, follow-up and prognosis.  Chart documentation with Dragon Voice recognition Software. Although reviewed after completion, some words and grammatical errors may remain.

## 2019-12-18 NOTE — PROGRESS NOTES
"Oncology Rooming Note    December 18, 2019 11:02 AM   Bia Bill is a 54 year old female who presents for:    Chief Complaint   Patient presents with     Hematology     3 month recheck Erythrocytosis      Initial Vitals: /44 (BP Location: Right arm, Patient Position: Sitting, Cuff Size: Adult Large)   Pulse 85   Temp 99  F (37.2  C) (Tympanic)   Resp 18   Ht 1.473 m (4' 10\")   Wt 109.8 kg (242 lb 1.6 oz)   LMP 09/27/2009   SpO2 94%   Breastfeeding No   BMI 50.60 kg/m   Estimated body mass index is 50.6 kg/m  as calculated from the following:    Height as of this encounter: 1.473 m (4' 10\").    Weight as of this encounter: 109.8 kg (242 lb 1.6 oz). Body surface area is 2.12 meters squared.  Extreme Pain (8) Comment: Data Unavailable   Patient's last menstrual period was 09/27/2009.  Allergies reviewed: Yes  Medications reviewed: Yes    Medications: Medication refills not needed today.  Pharmacy name entered into [facility]:    [place], [place] - [address] [id number], [place] - [address] [facility], [place] - [address] [place] [address]    Clinical concerns: 3 month recheck Erythrocytosis       Janice Gonzalez, [facility]            "

## 2019-12-18 NOTE — LETTER
"    12/18/2019         RE: Bia Bill  [address]  [place] [address]        Dear Colleague,    Thank you for referring your patient, Bia Bill, to the [facility] [place]. Please see a copy of my visit note below.    Oncology Rooming Note    December 18, 2019 11:02 AM   Bia Bill is a 54 year old female who presents for:    Chief Complaint   Patient presents with     Hematology     3 month recheck Erythrocytosis      Initial Vitals: /44 (BP Location: Right arm, Patient Position: Sitting, Cuff Size: Adult Large)   Pulse 85   Temp 99  F (37.2  C) (Tympanic)   Resp 18   Ht 1.473 m (4' 10\")   Wt 109.8 kg (242 lb 1.6 oz)   LMP 09/27/2009   SpO2 94%   Breastfeeding No   BMI 50.60 kg/m    Estimated body mass index is 50.6 kg/m  as calculated from the following:    Height as of this encounter: 1.473 m (4' 10\").    Weight as of this encounter: 109.8 kg (242 lb 1.6 oz). Body surface area is 2.12 meters squared.  Extreme Pain (8) Comment: Data Unavailable   Patient's last menstrual period was 09/27/2009.  Allergies reviewed: Yes  Medications reviewed: Yes    Medications: Medication refills not needed today.  Pharmacy name entered into [facility]:    [place], [place] - [address] [id number], [place] - [address] [facility], [place] - [address] [place] [address]    Clinical concerns: 3 month recheck Erythrocytosis       Janice Gonzalez CMA              DATE OF VISIT: Dec 18, 2019    Bia Bill is a 54 year old female is seen today for   Chief Complaint   Patient presents with     Hematology     3 month recheck Erythrocytosis    .       (D75.1) Erythrocytosis  (primary encounter diagnosis)  I reviewed with the patient today most recent CBC.  Hematocrit is elevated at 51.  Patient remains asymptomatic.  I would recommend to continue to monitor patient hematocrit.  I encouraged the " patient to use CPAP machine for her sleep apnea.  I will see the patient again in 6 months time.  We will check CBC periodically during this time.    The patient is ready to learn, no apparent learning barriers were identified.  Diagnosis and treatment plans were explained to the patient. The patient expressed understanding of the content. The patient asked appropriate questions. The patient questions were answered to her satisfaction.  Time spent 15 minutes more than 50% of the time in counseling and coordination of care including discussion of management of erythrocytosis, follow-up and prognosis.  Chart documentation with Dragon Voice recognition Software. Although reviewed after completion, some words and grammatical errors may remain.    Again, thank you for allowing me to participate in the care of your patient.        Sincerely,        Joshua Watts MD

## 2019-12-19 ENCOUNTER — OFFICE VISIT (OUTPATIENT)
Dept: FAMILY MEDICINE | Facility: CLINIC | Age: 54
End: 2019-12-19
Payer: COMMERCIAL

## 2019-12-19 VITALS
DIASTOLIC BLOOD PRESSURE: 55 MMHG | TEMPERATURE: 98.9 F | OXYGEN SATURATION: 94 % | BODY MASS INDEX: 50.45 KG/M2 | WEIGHT: 241.4 LBS | SYSTOLIC BLOOD PRESSURE: 130 MMHG | HEART RATE: 88 BPM | RESPIRATION RATE: 18 BRPM

## 2019-12-19 DIAGNOSIS — I89.0 LYMPHEDEMA: ICD-10-CM

## 2019-12-19 DIAGNOSIS — F31.62 MODERATE MIXED BIPOLAR I DISORDER (H): Chronic | ICD-10-CM

## 2019-12-19 DIAGNOSIS — L08.9 SKIN INFECTION: ICD-10-CM

## 2019-12-19 PROCEDURE — 99495 TRANSJ CARE MGMT MOD F2F 14D: CPT | Performed by: FAMILY MEDICINE

## 2019-12-19 RX ORDER — CLINDAMYCIN HCL 300 MG
300 CAPSULE ORAL 3 TIMES DAILY
Qty: 21 CAPSULE | Refills: 0 | Status: ON HOLD | OUTPATIENT
Start: 2019-12-19 | End: 2020-01-23

## 2019-12-19 ASSESSMENT — PAIN SCALES - GENERAL: PAINLEVEL: EXTREME PAIN (8)

## 2019-12-19 NOTE — TELEPHONE ENCOUNTER
"POTASSIUM CHLORIDE PLACIDO ER 10 TBCR      Last Written Prescription Date:  10/1/19  Last Fill Quantity: 90,   # refills: 0  Last Office Visit: 11/20/19  Future Office visit:    Next 5 appointments (look out 90 days)    Dec 19, 2019  2:20 PM CST  [facility]d [facility] [address]  [facility] [address]  [phone]   Dec 31, 2019  2:00 PM CST  Return Visit with Kd Morris  [facility] ([facility]) [address] [facility] [address]  [phone]   Jan 14, 2020  2:00 PM CST  Return Visit with Kd Morris  [facility] ([facility]) [address] [facility] [address]  [phone]   Jan 16, 2020  4:00 PM CST  Telephone Visit with Patel Bazzi MD  [facility] [address] [facility] [address]  [phone]           Requested Prescriptions   Pending Prescriptions Disp Refills     potassium chloride ER (K-DUR/KLOR-CON M) 10 MEQ CR tablet [Pharmacy Med Name: POTASSIUM CHLORIDE PLACIDO ER 10 TBCR] 90 tablet 0     Sig: TAKE ONE TABLET BY MOUTH ONCE DAILY       Potassium Supplements Protocol Passed - 12/19/2019  5:03 AM        Passed - Recent (12 mo) or future (30 days) visit within the authorizing provider's department     Patient has had an office visit with the authorizing provider or a provider within the authorizing providers department within the previous 12 mos or has a future within next 30 days. See \"Patient Info\" tab in inbasket, or \"Choose Columns\" in Meds & Orders section of the refill encounter.              Passed - Medication is active on med list        Passed - Patient is age 18 or older        Passed - Normal serum potassium in past 12 months     Recent Labs   Lab Test 12/11/19  0518   POTASSIUM 4.2                      "

## 2019-12-19 NOTE — PROGRESS NOTES
SUBJECTIVE:                                                    Bia Bill is a 54 year old female who presents to clinic today for the following health issues:      Hospital Follow-up Visit:    Hospital/Nursing Home/IP Rehab Facility: [facility]  Date of Admission: 12/10/2019  Date of Discharge: 12/12/2019  Reason(s) for Admission: rash and swelling in her legs            Problems taking medications regularly:  None       Medication changes since discharge: None       Problems adhering to non-medication therapy:  None      Summary of hospitalization:  [facility] discharge summary reviewed  Diagnostic Tests/Treatments reviewed.  Follow up needed: none  Other Healthcare Providers Involved in Patient s Care:         None  Update since discharge: improved.     Post Discharge Medication Reconciliation: discharge medications reconciled, continue medications without change.  Plan of care communicated with patient     Coding guidelines for this visit:  Type of Medical   Decision Making Face-to-Face Visit       within 7 Days of discharge Face-to-Face Visit        within 14 days of discharge   Moderate Complexity [id number]   High Complexity [id number]              Problem list and histories reviewed & adjusted, as indicated.  Additional history:     Patient Active Problem List   Diagnosis     Mild persistent asthma     Polyneuropathy in other diseases classified elsewhere (H)     DVT (deep venous thrombosis) (H)     Alcohol abuse, in remission     SECONDARY POLYCYTHEMIA     Chondromalacia of patella     Developmental reading disorder     Esophageal reflux     Hypothyroidism     Fatty liver     GILES (Obstructive Sleep Apnea)-Moderate (AHI 16)     RLS (restless legs syndrome)     Smoker     Erythrocytosis     Moderate mixed bipolar I disorder (H)     Personality disorder, depressive     COPD (chronic obstructive pulmonary disease) (H)     Rosacea     [facility]     DDD (degenerative disc  disease), cervical     Benign neoplasm of colon (POLYPOSIS)     History of stroke symptoms, stroke ruled out on imaging     Somatization disorder     Sebaceous cyst of right axilla     HTN, goal below 140/90     Left leg pain     Vitamin D deficiency     Morbid obesity (H)     PVD (peripheral vascular disease) with claudication (H)     Cyst of left ovary     Morbid obesity with alveolar hypoventilation (H)     Severe episode of recurrent major depressive disorder (H)     Conductive hearing loss of left ear with restricted hearing of right ear     Sensorineural hearing loss (SNHL) of right ear with restricted hearing of left ear     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     Cellulitis     COPD exacerbation (H)     Personal history of DVT (deep vein thrombosis)     Acute respiratory failure with hypoxia and hypercapnia (H)     Lymphedema of both lower extremities     Bronchitis     Rash     Type 2 diabetes mellitus (H)     Bilateral edema of lower extremity     Skin infection     Past Surgical History:   Procedure Laterality Date     BONE MARROW BIOPSY, BONE SPECIMEN, NEEDLE/TROCAR N/A 11/17/2014    Procedure: BIOPSY BONE MARROW;  Surgeon: Hay Aaron MD;  Location: [facility]     D & C  10/26/09    with uterine ablation     ENDOVASCULAR PLACEMENT VASCULAR DEVICE Left     Left leg stent x 3     ESOPHAGOSCOPY, GASTROSCOPY, DUODENOSCOPY (EGD), COMBINED  4/21/2014    Procedure: Gastroscopy;  Surgeon: Moris Thomas MD;  Location: [facility]     HYSTERECTOMY, PAP NO LONGER INDICATED  1-4-2010     LAPAROSCOPIC CHOLECYSTECTOMY N/A 1/4/2019    Procedure: Laparoscopic cholecystectomy;  Surgeon: Aaron Siegel MD;  Location: [facility]     LITHOTRIPSY  2004    Lithotrypsy       Social History     Tobacco Use     Smoking status: Former Smoker     Packs/day: 0.50     Years: 34.00     Pack years: 17.00     Types: Cigarettes     Last attempt to quit: 11/13/2019     Years since  quittin.1     Smokeless tobacco: Never Used     Tobacco comment: started at age 11.  Quit during pregnancyX4.  Quit 3 months ago.    Substance Use Topics     Alcohol use: No     Alcohol/week: 0.0 standard drinks     Comment: quit      Family History   Problem Relation Age of Onset     Hypertension Mother      Diabetes Mother      Blood Disease Mother      Heart Disease Mother         chf     Cerebrovascular Disease Mother      Hypertension Father      Cancer Father         lung cancer     Allergies Sister      Diabetes Sister      Depression Sister      Hypertension Sister          Current Outpatient Medications   Medication Sig Dispense Refill     acetaminophen (TYLENOL) 500 MG tablet Take 500-1,000 mg by mouth every 6 hours as needed for mild pain       albuterol (PROAIR HFA/PROVENTIL HFA/VENTOLIN HFA) 108 (90 Base) MCG/ACT inhaler Inhale 2 puffs into the lungs every 6 hours as needed for shortness of breath / dyspnea or wheezing 1 Inhaler 11     albuterol (PROVENTIL) (2.5 MG/3ML) 0.083% neb solution Take 1 vial (2.5 mg) by nebulization every 6 hours as needed for shortness of breath / dyspnea or wheezing 1 Box 0     allopurinol (ZYLOPRIM) 300 MG tablet Take 1 tablet (300 mg) by mouth daily 90 tablet 3     ARIPiprazole (ABILIFY) 10 MG tablet TAKE ONE TABLET BY MOUTH EVERY DAY (Patient taking differently: Take 10 mg by mouth every evening ) 90 tablet 1     clindamycin (CLEOCIN) 300 MG capsule Take 1 capsule (300 mg) by mouth 3 times daily 21 capsule 0     EPINEPHrine (EPIPEN/ADRENACLICK/OR ANY BX GENERIC EQUIV) 0.3 MG/0.3ML injection 2-pack Inject 0.3 mLs (0.3 mg) into the muscle once as needed for anaphylaxis 0.6 mL 0     fluticasone-salmeterol (ADVAIR HFA) 230-21 MCG/ACT inhaler Inhale 2 puffs into the lungs 2 times daily 1 Inhaler 3     furosemide (LASIX) 20 MG tablet 3 tab in am and 2 in pm. 360 tablet 3     gabapentin (NEURONTIN) 300 MG capsule TAKE ONE CAPSULE BY MOUTH ONCE DAILY AT BEDTIME WITH A 600  MG TABLET FOR A TOTAL DOSE  MG (Patient taking differently: Take 300 mg by mouth At Bedtime TAKE ONE CAPSULE BY MOUTH ONCE DAILY AT BEDTIME WITH A 600 MG TABLET FOR A TOTAL DOSE  MG) 90 capsule 0     gabapentin (NEURONTIN) 600 MG tablet TAKE ONE TABLET BY MOUTH THREE TIMES A DAY (Patient taking differently: Take 600 mg by mouth 3 times daily TAKE ONE TABLET BY MOUTH THREE TIMES A DAY) 90 tablet 3     ipratropium - albuterol 0.5 mg/2.5 mg/3 mL (DUONEB) 0.5-2.5 (3) MG/3ML neb solution Take 1 vial (3 mLs) by nebulization every 6 hours as needed for shortness of breath / dyspnea or wheezing 30 vial 11     lamoTRIgine (LAMICTAL) 100 MG tablet Take 1 tablet (100 mg) by mouth daily 90 tablet 3     levothyroxine (SYNTHROID/LEVOTHROID) 175 MCG tablet Take 1 tablet (175 mcg) by mouth daily 90 tablet 0     mometasone-formoterol (DULERA) 200-5 MCG/ACT oral inhaler Inhale 2 puffs into the lungs 2 times daily 13 g 1     omeprazole (PRILOSEC) 20 MG DR capsule TAKE ONE CAPSULE BY MOUTH TWICE A DAY (Patient taking differently: Take 20 mg by mouth 2 times daily ) 180 capsule 0     order for DME Equipment being ordered:x2 [employer] 30/40mmHg compression wraps with x2 extra prs of compression liners 1 each 0     order for DME Equipment being ordered: Nebulizer 1 Device 0     order for DME Equipment being ordered: CPAP  AIRSENSE 10  5-18 CM H20  SN# [id number]   DN# [id number]       order for DME Equipment being ordered: DEPENDS SIZE LARGE 60 Month 5     order for DME Equipment being ordered: INCONTINENCE PADS   QID 1 Month 11     pramipexole (MIRAPEX) 0.125 MG tablet TAKE 1-2 TABLETS BY MOUTH AT BEDTIME (Patient taking differently: Take 0.125-2.5 mg by mouth At Bedtime TAKE 1-2 TABLETS BY MOUTH AT BEDTIME) 60 tablet 10     simvastatin (ZOCOR) 20 MG tablet TAKE ONE TABLET BY MOUTH EVERY NIGHT AT BEDTIME (Patient taking differently: Take 20 mg by mouth At Bedtime ) 90 tablet 3     traZODone (DESYREL) 150 MG tablet TAKE ONE TABLET BY  MOUTH EVERY NIGHT AT BEDTIME 90 tablet 1     VITAMIN D3 1000 units tablet TAKE ONE TABLET BY MOUTH ONCE DAILY (Patient taking differently: Take 1,000 Units by mouth daily ) 100 tablet 2     warfarin ANTICOAGULANT (JANTOVEN ANTICOAGULANT) 5 MG tablet 12.5 mg (5 mg x 2.5) every day OR AS DIRECTED BY  tablet 0     buPROPion (WELLBUTRIN XL) 150 MG 24 hr tablet TAKE ONE TABLET BY MOUTH EVERY EVENING 150 tablet 0     potassium chloride ER (K-DUR/KLOR-CON M) 10 MEQ CR tablet TAKE ONE TABLET BY MOUTH ONCE DAILY 90 tablet 0     Allergies   Allergen Reactions     Darvocet [Propoxyphene N-Apap] Anaphylaxis     Darvocet,Percocet      Percocet [Oxycodone-Acetaminophen] Anaphylaxis, Hives and Swelling     Has tolerated hydromorphone in the past.      Asa [Aspirin] Hives     Aspirin causes seizures and hives, throat swelling.   Has tolerated ketorolac in the past.        Bee      Ibuprofen Swelling     Throat swelling per patient      Lyrica [Pregabalin] Other (See Comments) and Swelling     dizziness     Povidone Iodine Rash     Reaction to topical betadine     Tape [Adhesive Tape] Rash       ROS:  Constitutional, HEENT, cardiovascular, pulmonary, gi and gu systems are negative, except as otherwise noted.    OBJECTIVE:                                                    /55   Pulse 88   Temp 98.9  F (37.2  C) (Tympanic)   Resp 18   Wt 109.5 kg (241 lb 6.4 oz)   LMP 09/27/2009   SpO2 94%   BMI 50.45 kg/m   Body mass index is 50.45 kg/m .   GENERAL: healthy, alert, well nourished, well hydrated, no distress  HENT: ear canals- normal; TMs- normal; Nose- normal; Mouth- no ulcers, no lesions  NECK: no tenderness, no adenopathy, no asymmetry, no masses, no stiffness; thyroid- normal to palpation  RESP: lungs clear to auscultation - no rales, no rhonchi, no wheezes  CV: regular rates and rhythm, normal S1 S2, no S3 or S4 and no murmur, no click or rub -  ABDOMEN: soft, no tenderness, no  hepatosplenomegaly, no masses,  normal bowel sounds  SKIN: has less erythema than is demarkated but still has significant erythema     ASSESSMENT/PLAN:                                                      (L08.9) Skin infection  Mostly resolved but I think she could n nbenifit from a few more days with antibiotics  Plan: clindamycin (CLEOCIN) 300 MG capsule       reports that she quit smoking about 6 weeks ago. Her smoking use included cigarettes. She has a 17.00 pack-year smoking history. She has never used smokeless tobacco.    Weight management plan: Discussed healthy diet and exercise guidelines    return to clinic or call if unimproved in 3-4 days sooner if worse.    [facility]

## 2019-12-19 NOTE — TELEPHONE ENCOUNTER
"BUPROPION HCL ER (XL) 150MG TB24      Last Written Prescription Date:  7/16/19  Last Fill Quantity: 150,   # refills: 0  Last Office Visit: 11/20/19  Future Office visit:    Next 5 appointments (look out 90 days)    Dec 19, 2019  2:20 PM CST  [facility] [address] [facility] [address]  [phone]   Dec 31, 2019  2:00 PM CST  Return Visit with Kd Morris  [facility]) [address] [facility] [address]  [phone]   Jan 14, 2020  2:00 PM CST  Return Visit with Kd Morris  [facility] ([facility]) [address] [facility] [address]  [phone]   Jan 16, 2020  4:00 PM CST  Telephone Visit with Patel Bazzi MD  [facility] ([facility]) [address] [facility] [address]  [phone]           Requested Prescriptions   Pending Prescriptions Disp Refills     buPROPion (WELLBUTRIN XL) 150 MG 24 hr tablet [Pharmacy Med Name: BUPROPION HCL ER (XL) 150MG TB24] 150 tablet 0     Sig: TAKE ONE TABLET BY MOUTH EVERY EVENING       SSRIs Protocol Passed - 12/19/2019  5:03 AM        Passed - Recent (12 mo) or future (30 days) visit within the authorizing provider's specialty     Patient has had an office visit with the authorizing provider or a provider within the authorizing providers department within the previous 12 mos or has a future within next 30 days. See \"Patient Info\" tab in inbasket, or \"Choose Columns\" in Meds & Orders section of the refill encounter.              Passed - Medication is Bupropion     If the medication is Bupropion (Wellbutrin), and the patient is taking for smoking cessation; OK to refill.          Passed - Medication is active on med list        Passed - Patient is age 18 or older        Passed - No active pregnancy on record        Passed - No positive " pregnancy test in last 12 months

## 2019-12-23 RX ORDER — POTASSIUM CHLORIDE 750 MG/1
TABLET, EXTENDED RELEASE ORAL
Qty: 90 TABLET | Refills: 0 | Status: SHIPPED | OUTPATIENT
Start: 2019-12-23 | End: 2020-04-08

## 2019-12-23 RX ORDER — BUPROPION HYDROCHLORIDE 150 MG/1
TABLET ORAL
Qty: 150 TABLET | Refills: 0 | Status: SHIPPED | OUTPATIENT
Start: 2019-12-23 | End: 2020-06-15

## 2019-12-23 NOTE — TELEPHONE ENCOUNTER
Routing refill request to provider for review/approval because:  12/19/19 have not been finalized.  Thank you.   Mundo Conde RN

## 2019-12-23 NOTE — TELEPHONE ENCOUNTER
Routing refill request to provider for review/approval because:  12/19/19 office visit notes are not finalized.  Mundo Conde RN

## 2019-12-23 NOTE — PROGRESS NOTES
Progress Note  Disclaimer: This note consists of symbols derived from keyboarding, dictation and/or voice recognition software. As a result, there may be errors in the script that have gone undetected. Please consider this when interpreting information found in this chart.    Client Name: Bia Bill  Date: 12/18//2019         Service Type: Individual      Session Start Time: 9:30 AM   session End Time 10:15 AM   session Length: 45     Session #: 49     Attendees: Client attended alone    Treatment Plan Last Reviewed: 11/6/2019  PHQ-9 / CHIN-7 : See flowsheet     DATA  Client reports maintaining boundaries well with her daughter and sister. Client has made it clear she does not want to be around her daughter if she is drinking.Congratulated client on what must have been a very difficult decision, especially during the holidays.     Progress Since Last Session (Related to Symptoms / Goals / Homework):   Symptoms: Stable    Homework: Achieved / completed to satisfaction      Episode of Care Goals: Satisfactory progress - ACTION (Actively working towards change); Intervened by reinforcing change plan / affirming steps taken     Current / Ongoing Stressors and Concerns:   Recurrent depression  family relational problems, medical problems, COPD, chronic pain, trauma history.     Treatment Objective(s) Addressed in This Session:   Increase interest, engagement, and pleasure in doing things  Decrease frequency and intensity of feeling down, depressed, hopeless interpersonal assertiveness       Intervention:   CBT: Behavioral activation supported client's effort at establishing maintaining healthy boundaries with her family.  Processed frustrations and reviewed how things are better if she stands up for herself.      ASSESSMENT: Current Emotional / Mental Status (status of significant symptoms):   Risk status (Self / Other harm or suicidal ideation)   Client denies current  fears or concerns for personal safety.   Client denies current or recent suicidal ideation or behaviors.   Client denies current or recent homicidal ideation or behaviors.   Client denies current or recent self injurious behavior or ideation.   Client denies other safety concerns.   A safety and risk management plan has not been developed at this time, however client was given the after-hours number / 911 should there be a change in any of these risk factors.     Appearance:   Appropriate    Eye Contact:   Good    Psychomotor Behavior: Normal    Attitude:   Cooperative    Orientation:   All   Speech    Rate / Production: Normal     Volume:  Normal    Mood:    Normal   Affect:    Appropriate    Thought Content:  Clear    Thought Form:  Coherent  Logical    Insight:    Good      Medication Review:   No changes to current psychiatric medication(s)     Medication Compliance:   Yes     Changes in Health Issues:   None reported     Chemical Use Review:   Substance Use: Chemical use reviewed, no active concerns identified      Tobacco Use: Client reports she is down to 1 cigarette per day from 2 packs/day.   Collateral Reports Completed:   Not Applicable    PLAN: (Client Tasks / Therapist Tasks / Other)  Client to continue With her self-care routine and maintenance of boundaries.    Practice one to 2 grounding techniques each day. Client will Identify 1-2Creative activities or hobbies  and engage in them 2 times per week  Client to maintain sobriety and contact with supportive sober others.  Continue medication compliance.    Kd Morris                                                         ________________________________________________________________________    Treatment Plan    Client's Name: Bia Bill  YOB: 1965    Date: 11/6/2019      DSM5 Diagnoses: (Sustained by DSM5 Criteria Listed Above)  Diagnoses: 296.40 Bipolar I Disorder Current or Most Recent Episode Manic,  Unspecified  Psychosocial & Contextual Factors: financial difficulties, chronic pain, roommate issues  WHODAS 2.0 (12 item)               This questionnaire asks about difficulties due to health conditions. Health conditions             include disease or illnesses, other health problems that may be short or long lasting,             injuries, mental health or emotional problems, and problems with alcohol or drugs.                         Think back over the past 30 days and answer these questions, thinking about how much             difficulty you had doing the following activities. For each question, please [place] only             one response.      S1  Standing for long periods such as 30 minutes?  Mild =           2    S2  Taking care of household responsibilities?  Moderate =   3    S3  Learning a new task, for example, learning how to get to a new place?  Mild =           2    S4  How much of a problem do you have joining community activities (for example, festivals, Worship or other activities) in the same way as anyone else can?  Moderate =   3    S5  How much have you been emotionally affected by your health problems?  Mild =           2        In the past 30 days, how much difficulty did you have in:    S6  Concentrating on doing something for ten minutes?  Mild =           2    S7  Walking a long distance such as a kilometer (or equivalent)?  Severe =       4    S8  Washing your whole body?  None =         1    S9  Getting dressed?  None =         1    S10  Dealing with people you do not know?  Moderate =   3    S11  Maintaining a friendship?  Severe =       4    S12  Your day to day work?  Moderate =   3       H1  Overall, in the past 30 days, how many days were these difficulties present?  Record number of days seven    H2  In the past 30 days, for how many days were you totally unable to carry out your usual activities or work because of any health condition?  Record number of days  seven    H3  In  the past 30 days, not counting the days that you were totally unable, for how many days did you cut back or reduce your usual activities or work because of any health condition?  Record number of days five                   Referral / Collaboration:  Referral to another professional/service is not indicated at this time..    Anticipated number of session or this episode of care: 15    Goals  1. Education- the Biopsychosocial model of depression  a. Client will be able to describe how depression is effecting them physically, emotionally and socially  2. Behavioral Activation  a. Client will learn to assess their depression on a day to day basis  b. Client will Identify two forms of exercise/activity and engage in them 3 times per week  c. Client will Identify 3 things that make them laugh, and engage in these 5 times per week.  d. Client will Identify 1-2Creative activities or hobbies  and engage in them 2 times per week  e. Client will identify music, movies, books that make them feel good and use them 3-4 times per week  3. Self-care  a. Client will identify 5 things they can do just for themselves  b. Client will take time for quiet, reflection, meditation 5 times per week  c. Client will Learn to set boundaries when appropriate  d. Client will Identify 2 individuals they can call on for support, distraction  4. Assessment of progress  a. Client will engage in assessment of their progress on a regular basis    Bipolar Disorder  Treatment plan:  1. Education- the Biopsychosocial model of Bipolar Disorder    a. Client will be able to describe in general terms what Bipolar Disorder  is and is not  b. Client will be able to describe how Bipolar Disorder  has affected their life in at least two different areas, such as school or work and Home/relationships  c. Clients parents/guardians or significant others will be provided information on what Bipolar Disorder  is and the ways it can affect relationships and be  encouraged to be a part of clients treatment team.  2. Medication  a. Client will participate in medication evaluation for Bipolar Disorder  symptoms and follow medication recommendations.   3. Identification and management of triggers  a. Client and therapist will examine patient s history to determine if there are predictable triggers for manic or depressive episodes (e.g. boredom, anger, family stress)  b. Client and therapist will develop means of diffusing these triggers (e.g. relaxation strategies, boundary setting, anger management)  4. Comorbid conditions   a. Client and therapist will asses for comorbid conditions (e.g. anxiety, depression, substance use). and add additional items to treatment plan as needed  5. Self-care  a. Client will identify 3 things they can do just for themselves  b. Client will take time for quiet, reflection, meditation 3 times per week  c. Client will Learn to set boundaries when appropriate  d. Client will Identify 2 individuals they can call on for support, distraction  5. Behavioral Activation  a. Client will Identify two forms of exercise/activity and engage in them 3 times per week  b. Client will Identify 2 things that make them laugh, and engage in these 3 times per week.  c. Client will Identify 2 Creative activities or hobbies  and engage in them 2 times per week  d. Client will identify music, movies, books that make them feel good and use them 3 times per week  6. Assessment of progress  a. Client will engage in assessment of their progress on a regular basis    Client has reviewed and agreed to the above plan.      Kd Morris  4/3/2018

## 2019-12-31 ENCOUNTER — ANTICOAGULATION THERAPY VISIT (OUTPATIENT)
Dept: ANTICOAGULATION | Facility: CLINIC | Age: 54
End: 2019-12-31

## 2019-12-31 ENCOUNTER — OFFICE VISIT (OUTPATIENT)
Dept: PSYCHOLOGY | Facility: CLINIC | Age: 54
End: 2019-12-31
Payer: COMMERCIAL

## 2019-12-31 DIAGNOSIS — I73.9 PVD (PERIPHERAL VASCULAR DISEASE) WITH CLAUDICATION (H): ICD-10-CM

## 2019-12-31 DIAGNOSIS — Z86.73 HISTORY OF STROKE: ICD-10-CM

## 2019-12-31 DIAGNOSIS — I82.409 DVT (DEEP VENOUS THROMBOSIS) (H): ICD-10-CM

## 2019-12-31 DIAGNOSIS — Z79.01 LONG TERM CURRENT USE OF ANTICOAGULANT THERAPY: ICD-10-CM

## 2019-12-31 DIAGNOSIS — F31.62 MODERATE MIXED BIPOLAR I DISORDER (H): Primary | ICD-10-CM

## 2019-12-31 LAB
HCT VFR BLD AUTO: 46.3 % (ref 35–47)
INR PPP: 1.71 (ref 0.86–1.14)

## 2019-12-31 PROCEDURE — 85014 HEMATOCRIT: CPT | Performed by: FAMILY MEDICINE

## 2019-12-31 PROCEDURE — 85610 PROTHROMBIN TIME: CPT | Performed by: FAMILY MEDICINE

## 2019-12-31 PROCEDURE — 99207 ZZC NO CHARGE NURSE ONLY: CPT

## 2019-12-31 PROCEDURE — 90834 PSYTX W PT 45 MINUTES: CPT | Performed by: PSYCHOLOGIST

## 2019-12-31 PROCEDURE — 36415 COLL VENOUS BLD VENIPUNCTURE: CPT | Performed by: FAMILY MEDICINE

## 2019-12-31 NOTE — PROGRESS NOTES
20  INR not drawn on 1/3. Writer left DVM advising patient to stay on maintenance dose and recheck the INR on 20.    Reina Forrest RN, BSN, PHN  Anticoagulation Clinic   [phone]        ANTICOAGULATION FOLLOW-UP CLINIC VISIT    Patient Name:  Bia Bill  Date:  2019  Contact Type:  Telephone/ Left DVM    SUBJECTIVE:  Patient Findings     Comments:   Writer left a DVM.   Dose was recently reduced due to COPD and Prednisone.   Writer instructed the patient to take 15 mg today then to resume the maintenance dose.   Recheck in 3 days.        Clinical Outcomes     Negatives:   Major bleeding event, Thromboembolic event, Anticoagulation-related hospital admission, Anticoagulation-related ED visit, Anticoagulation-related fatality    Comments:   Writer left a DVM.   Dose was recently reduced due to COPD and Prednisone.   Writer instructed the patient to take 15 mg today then to resume the maintenance dose.   Recheck in 3 days.           OBJECTIVE    INR   Date Value Ref Range Status   2019 1.71 (H) 0.86 - 1.14 Final       ASSESSMENT / PLAN  INR assessment SUB    Recheck INR In: 3 DAYS    INR Location Outside lab      Anticoagulation Summary  As of 2019    INR goal:   2.0-3.0   TTR:   33.7 % (11.2 mo)   INR used for dosin.71! (2019)   Warfarin maintenance plan:   12.5 mg (5 mg x 2.5) every day   Full warfarin instructions:   : 15 mg; Otherwise 12.5 mg every day   Weekly warfarin total:   87.5 mg   Plan last modified:   Reina Forrest RN (2019)   Next INR check:   1/3/2020   Priority:   Critical   Target end date:   Indefinite    Indications    PVD (peripheral vascular disease) with claudication (H) [I73.9]  Long term current use of anticoagulant therapy (Resolved) [Z79.01]  DVT (deep venous thrombosis) (H) [I82.409]  History of stroke symptoms  stroke ruled out on imaging [Z86.73]             Anticoagulation Episode Summary     INR check location:        Preferred lab:       Send INR reminders to:   [place] [facility] POOL    Comments:   * lab draw due to elevated hematocrit (fingerstick meters don't work). LEFT LE. STENT x 3 LEFT UPPER LEG. Previous arterial clot. AS of 10/9/19 Goal range 2.0-3.0.      Anticoagulation Care Providers     Provider Role Specialty Phone number    Kd Leong MD [facility] [phone]            See the Encounter Report to view Anticoagulation Flowsheet and Dosing Calendar (Go to Encounters tab in chart review, and find the Anticoagulation Therapy Visit)        Reina Forrest RN

## 2019-12-31 NOTE — PROGRESS NOTES
Outpatient Physical Therapy Progress Note     Patient: Bia Bill  : 1965    Beginning/End Dates of Reporting Period:  2019 to 2019    Referring Provider: Dr. Dang MD    Therapy Diagnosis: BLE secondary lymphedema/phlebolymphedema      Client Self Report: I was in the hospital for 3 days put me on antibiotic and am still taking it, legs are looking and feeling much better    Objective Measurements:  Objective Measure: girth  Details: R LE +9.1%, L LE +2.9%     Outcome Measures (most recent score):   LLIS = 25 on date of initial evaluation; pt will again complete LLIS at time of discharge    Goals:  Goal Identifier stg   Goal Description pt to have around the clock tolerance to BLE GCB for edema reduction response   Target Date 12/10/19   Date Met  19   Progress:     Goal Identifier ltg   Goal Description once appropriate, pt to be independent with donning, doffing and care of compression garments for longterm edema management for maintenance   Target Date 20   Date Met      Progress:     Goal Identifier ltg   Goal Description pt to be independent with longterm edema management for maintenance via HEP, elevation, skin cares and compression garment wear/use   Target Date 20   Date Met      Progress:     Goal Identifier ltg   Goal Description pt to have at least 5 point improvement on LLIS due to decreased edema in BLEs   Target Date 20   Date Met      Progress:     Progress Toward Goals:   Progress limited due to patient being hospitalized from 12/10 -  for BLE cellulitis vs dermatitis. Pt is back in her compression garments, last seen at [facility] lymphedema clinic on 19 with scheduled return for follow-up on 20 which is anticipate to be discharge.      Plan:  Continue therapy per current plan of care.    Discharge:  No

## 2020-01-03 ENCOUNTER — TELEPHONE (OUTPATIENT)
Dept: FAMILY MEDICINE | Facility: CLINIC | Age: 55
End: 2020-01-03

## 2020-01-03 ENCOUNTER — HOSPITAL ENCOUNTER (OUTPATIENT)
Dept: LAB | Facility: CLINIC | Age: 55
Discharge: HOME | End: 2020-01-03
Attending: INTERNAL MEDICINE | Admitting: INTERNAL MEDICINE
Payer: COMMERCIAL

## 2020-01-03 DIAGNOSIS — D75.1 ERYTHROCYTOSIS: ICD-10-CM

## 2020-01-03 LAB
BASOPHILS # BLD AUTO: 0 10E9/L (ref 0–0.2)
BASOPHILS NFR BLD AUTO: 0.5 %
DIFFERENTIAL METHOD BLD: ABNORMAL
EOSINOPHIL # BLD AUTO: 0.1 10E9/L (ref 0–0.7)
EOSINOPHIL NFR BLD AUTO: 1.8 %
ERYTHROCYTE [DISTWIDTH] IN BLOOD BY AUTOMATED COUNT: 22.4 % (ref 10–15)
HCT VFR BLD AUTO: 46.9 % (ref 35–47)
HGB BLD-MCNC: 13.8 G/DL (ref 11.7–15.7)
IMM GRANULOCYTES # BLD: 0 10E9/L (ref 0–0.4)
IMM GRANULOCYTES NFR BLD: 0.4 %
LYMPHOCYTES # BLD AUTO: 1.1 10E9/L (ref 0.8–5.3)
LYMPHOCYTES NFR BLD AUTO: 20 %
MCH RBC QN AUTO: 24.9 PG (ref 26.5–33)
MCHC RBC AUTO-ENTMCNC: 29.4 G/DL (ref 31.5–36.5)
MCV RBC AUTO: 85 FL (ref 78–100)
MONOCYTES # BLD AUTO: 0.3 10E9/L (ref 0–1.3)
MONOCYTES NFR BLD AUTO: 6 %
NEUTROPHILS # BLD AUTO: 4.1 10E9/L (ref 1.6–8.3)
NEUTROPHILS NFR BLD AUTO: 71.3 %
NRBC # BLD AUTO: 0 10*3/UL
NRBC BLD AUTO-RTO: 0 /100
PLATELET # BLD AUTO: 121 10E9/L (ref 150–450)
RBC # BLD AUTO: 5.54 10E12/L (ref 3.8–5.2)
WBC # BLD AUTO: 5.7 10E9/L (ref 4–11)

## 2020-01-03 PROCEDURE — 85025 COMPLETE CBC W/AUTO DIFF WBC: CPT | Performed by: INTERNAL MEDICINE

## 2020-01-03 PROCEDURE — 36415 COLL VENOUS BLD VENIPUNCTURE: CPT | Performed by: INTERNAL MEDICINE

## 2020-01-03 NOTE — TELEPHONE ENCOUNTER
Reason for call:  Patient reporting a symptom    Symptom or request: Bia uses compression (like velcro wraps).    She sees lymphedema clinic for the wraps to have them put on it sounded like.  She states that water has gone from her legs into her knees and can hardly stand the pain.  It's been about a week.  She doesn't know how she is going to handle this all weekend.  Please call and assess. Thank you..Lizzette Mcintosh    Duration (how long have symptoms been present): on going    Have you been treated for this before? Yes    Phone Number patient can be reached at:  Home number on file [phone] (home)    Best Time:  Any time    Can we leave a detailed message on this number:  YES    Call taken on 1/3/2020 at 2:27 PM by Lizzette Mcintosh

## 2020-01-03 NOTE — TELEPHONE ENCOUNTER
Patient reports that the swelling in her legs is getting worse. She has compression stockings on and leg wraps from the lymphedema clinic. Swelling is now in her knees. This has been going on for about 1 week and it is getting worse. Patient does have an appointment with lymphedema on 1.9.2020. Patient is taking an average of tylenol 6 tablets/day, but not helping with the pain.   Advised to elevate legs as much as possible. Keep appointment with lymphedema clinic. Can apply ice as needed. If pain is not controled or becomes prabhjot to walk, patient will have to be seen in ED over the weekend. Patient agreed with plan.  Aubree Richardson RN

## 2020-01-06 ENCOUNTER — THERAPY VISIT (OUTPATIENT)
Dept: SLEEP MEDICINE | Facility: CLINIC | Age: 55
End: 2020-01-06
Payer: COMMERCIAL

## 2020-01-06 DIAGNOSIS — G47.33 OSA (OBSTRUCTIVE SLEEP APNEA): ICD-10-CM

## 2020-01-06 DIAGNOSIS — J44.1 COPD EXACERBATION (H): ICD-10-CM

## 2020-01-06 DIAGNOSIS — E66.2 HYPOVENTILATION ASSOCIATED WITH OBESITY (H): ICD-10-CM

## 2020-01-06 DIAGNOSIS — J96.12 CHRONIC RESPIRATORY FAILURE WITH HYPOXIA AND HYPERCAPNIA (H): ICD-10-CM

## 2020-01-06 DIAGNOSIS — J96.11 CHRONIC RESPIRATORY FAILURE WITH HYPOXIA AND HYPERCAPNIA (H): ICD-10-CM

## 2020-01-06 PROCEDURE — 95811 POLYSOM 6/>YRS CPAP 4/> PARM: CPT | Performed by: FAMILY MEDICINE

## 2020-01-06 ASSESSMENT — ANXIETY QUESTIONNAIRES
GAD7 TOTAL SCORE: 8
6. BECOMING EASILY ANNOYED OR IRRITABLE: MORE THAN HALF THE DAYS
1. FEELING NERVOUS, ANXIOUS, OR ON EDGE: MORE THAN HALF THE DAYS
5. BEING SO RESTLESS THAT IT IS HARD TO SIT STILL: MORE THAN HALF THE DAYS
2. NOT BEING ABLE TO STOP OR CONTROL WORRYING: NOT AT ALL
7. FEELING AFRAID AS IF SOMETHING AWFUL MIGHT HAPPEN: NOT AT ALL
3. WORRYING TOO MUCH ABOUT DIFFERENT THINGS: NOT AT ALL
4. TROUBLE RELAXING: MORE THAN HALF THE DAYS

## 2020-01-06 ASSESSMENT — PATIENT HEALTH QUESTIONNAIRE - PHQ9: SUM OF ALL RESPONSES TO PHQ QUESTIONS 1-9: 10

## 2020-01-06 NOTE — PROGRESS NOTES
Progress Note  Disclaimer: This note consists of symbols derived from keyboarding, dictation and/or voice recognition software. As a result, there may be errors in the script that have gone undetected. Please consider this when interpreting information found in this chart.    Client Name: Bia Bill  Date: 12/31/2019         Service Type: Individual      Session Start Time: 2:00 PM   session End Time 2:45 PM   session Length: 45     Session #: 50     Attendees: Client attended alone    Treatment Plan Last Reviewed: 11/6/2019  PHQ-9 / CHIN-7 : See flowsheet     DATA       Progress Since Last Session (Related to Symptoms / Goals / Homework):   Symptoms: Stable    Homework: Achieved / completed to satisfaction      Episode of Care Goals: Satisfactory progress - ACTION (Actively working towards change); Intervened by reinforcing change plan / affirming steps taken     Current / Ongoing Stressors and Concerns:  Client reports she had her daughter over for Hannah dinner.  She was able to maintain boundaries around drinking, though her daughter complained about things a great deal.  Reports her sister keeps trying to get her to try her narcotic pain medications, client is resisting, knowing her history of chemical dependency would put her at risk.  Nevertheless, the pain is making it difficult to sleep. Recurrent depression  family relational problems, medical problems, COPD, chronic pain, trauma history.     Treatment Objective(s) Addressed in This Session:   Increase interest, engagement, and pleasure in doing things  Decrease frequency and intensity of feeling down, depressed, hopeless interpersonal assertiveness       Intervention:   CBT: Behavioral activation supported client's effort at establishing maintaining healthy boundaries with her family.  Processed frustrations and reviewed how things are better if she stands up for herself.      ASSESSMENT: Current  Emotional / Mental Status (status of significant symptoms):   Risk status (Self / Other harm or suicidal ideation)   Client denies current fears or concerns for personal safety.   Client denies current or recent suicidal ideation or behaviors.   Client denies current or recent homicidal ideation or behaviors.   Client denies current or recent self injurious behavior or ideation.   Client denies other safety concerns.   A safety and risk management plan has not been developed at this time, however client was given the after-hours number / 911 should there be a change in any of these risk factors.     Appearance:   Appropriate    Eye Contact:   Good    Psychomotor Behavior: Normal    Attitude:   Cooperative    Orientation:   All   Speech    Rate / Production: Normal     Volume:  Normal    Mood:    Normal   Affect:    Appropriate    Thought Content:  Clear    Thought Form:  Coherent  Logical    Insight:    Good      Medication Review:   No changes to current psychiatric medication(s)     Medication Compliance:   Yes     Changes in Health Issues:   None reported     Chemical Use Review:   Substance Use: Chemical use reviewed, no active concerns identified      Tobacco Use: Client reports she is down to 1 cigarette per day from 2 packs/day.  Diagnosis:  1. Moderate mixed bipolar I disorder (H)         Collateral Reports Completed:   Not Applicable    PLAN: (Client Tasks / Therapist Tasks / Other)  Client to continue With her self-care routine and maintenance of boundaries.   Client will Identify 1-2Creative activities or hobbies  and engage in them 2 times per week    Kd Morris                                                         ________________________________________________________________________    Treatment Plan    Client's Name: Bia Bill  YOB: 1965    Date: 11/6/2019      DSM5 Diagnoses: (Sustained by DSM5 Criteria Listed Above)  Diagnoses: 296.40 Bipolar I Disorder Current or  Most Recent Episode Manic, Unspecified  Psychosocial & Contextual Factors: financial difficulties, chronic pain, roommate issues  WHODAS 2.0 (12 item)               This questionnaire asks about difficulties due to health conditions. Health conditions             include disease or illnesses, other health problems that may be short or long lasting,             injuries, mental health or emotional problems, and problems with alcohol or drugs.                         Think back over the past 30 days and answer these questions, thinking about how much             difficulty you had doing the following activities. For each question, please [place] only             one response.      S1  Standing for long periods such as 30 minutes?  Mild =           2    S2  Taking care of household responsibilities?  Moderate =   3    S3  Learning a new task, for example, learning how to get to a new place?  Mild =           2    S4  How much of a problem do you have joining community activities (for example, festivals, Yazdanism or other activities) in the same way as anyone else can?  Moderate =   3    S5  How much have you been emotionally affected by your health problems?  Mild =           2        In the past 30 days, how much difficulty did you have in:    S6  Concentrating on doing something for ten minutes?  Mild =           2    S7  Walking a long distance such as a kilometer (or equivalent)?  Severe =       4    S8  Washing your whole body?  None =         1    S9  Getting dressed?  None =         1    S10  Dealing with people you do not know?  Moderate =   3    S11  Maintaining a friendship?  Severe =       4    S12  Your day to day work?  Moderate =   3       H1  Overall, in the past 30 days, how many days were these difficulties present?  Record number of days seven    H2  In the past 30 days, for how many days were you totally unable to carry out your usual activities or work because of any health condition?  Record number  of days  seven    H3  In the past 30 days, not counting the days that you were totally unable, for how many days did you cut back or reduce your usual activities or work because of any health condition?  Record number of days five                   Referral / Collaboration:  Referral to another professional/service is not indicated at this time..    Anticipated number of session or this episode of care: 60    Goals  1. Education- the Biopsychosocial model of depression  a. Client will be able to describe how depression is effecting them physically, emotionally and socially  2. Behavioral Activation  a. Client will learn to assess their depression on a day to day basis  b. Client will Identify two forms of exercise/activity and engage in them 3 times per week  c. Client will Identify 3 things that make them laugh, and engage in these 5 times per week.  d. Client will Identify 1-2Creative activities or hobbies  and engage in them 2 times per week  e. Client will identify music, movies, books that make them feel good and use them 3-4 times per week  3. Self-care  a. Client will identify 5 things they can do just for themselves  b. Client will take time for quiet, reflection, meditation 5 times per week  c. Client will Learn to set boundaries when appropriate  d. Client will Identify 2 individuals they can call on for support, distraction  4. Assessment of progress  a. Client will engage in assessment of their progress on a regular basis    Bipolar Disorder  Treatment plan:  1. Education- the Biopsychosocial model of Bipolar Disorder    a. Client will be able to describe in general terms what Bipolar Disorder  is and is not  b. Client will be able to describe how Bipolar Disorder  has affected their life in at least two different areas, such as school or work and Home/relationships  c. Clients parents/guardians or significant others will be provided information on what Bipolar Disorder  is and the ways it can affect  relationships and be encouraged to be a part of clients treatment team.  2. Medication  a. Client will participate in medication evaluation for Bipolar Disorder  symptoms and follow medication recommendations.   3. Identification and management of triggers  a. Client and therapist will examine patient s history to determine if there are predictable triggers for manic or depressive episodes (e.g. boredom, anger, family stress)  b. Client and therapist will develop means of diffusing these triggers (e.g. relaxation strategies, boundary setting, anger management)  4. Comorbid conditions   a. Client and therapist will asses for comorbid conditions (e.g. anxiety, depression, substance use). and add additional items to treatment plan as needed  5. Self-care  a. Client will identify 3 things they can do just for themselves  b. Client will take time for quiet, reflection, meditation 3 times per week  c. Client will Learn to set boundaries when appropriate  d. Client will Identify 2 individuals they can call on for support, distraction  5. Behavioral Activation  a. Client will Identify two forms of exercise/activity and engage in them 3 times per week  b. Client will Identify 2 things that make them laugh, and engage in these 3 times per week.  c. Client will Identify 2 Creative activities or hobbies  and engage in them 2 times per week  d. Client will identify music, movies, books that make them feel good and use them 3 times per week  6. Assessment of progress  a. Client will engage in assessment of their progress on a regular basis    Client has reviewed and agreed to the above plan.      Kd Morris  4/3/2018

## 2020-01-07 ENCOUNTER — HOSPITAL ENCOUNTER (OUTPATIENT)
Dept: RESPIRATORY THERAPY | Facility: CLINIC | Age: 55
Discharge: HOME | End: 2020-01-07
Attending: FAMILY MEDICINE | Admitting: FAMILY MEDICINE
Payer: COMMERCIAL

## 2020-01-07 LAB
BASE EXCESS BLDA CALC-SCNC: 8.1 MMOL/L
HCO3 BLD-SCNC: 34 MMOL/L (ref 21–28)
O2/TOTAL GAS SETTING VFR VENT: 21 %
PCO2 BLD: 51 MM HG (ref 35–45)
PH BLD: 7.43 PH (ref 7.35–7.45)
PO2 BLD: 45 MM HG (ref 80–105)

## 2020-01-07 PROCEDURE — 82803 BLOOD GASES ANY COMBINATION: CPT | Performed by: FAMILY MEDICINE

## 2020-01-07 PROCEDURE — 36600 WITHDRAWAL OF ARTERIAL BLOOD: CPT

## 2020-01-07 ASSESSMENT — ANXIETY QUESTIONNAIRES: GAD7 TOTAL SCORE: 8

## 2020-01-07 NOTE — PROGRESS NOTES
ABG Obtained RR. Pressure applied x 5 minute and pressure dressing applied. Pts SpO2 93% on room air.

## 2020-01-07 NOTE — PROGRESS NOTES
Completed a all night titration PSG per provider order.    Preliminary AHI 59.7.  A final therapeutic PAP pressure was achieved.    Supine REM was not seen on therapeutic pressure.    Patient reports feeling refreshed in AM.

## 2020-01-08 LAB — SLPCOMP: NORMAL

## 2020-01-09 ENCOUNTER — TELEPHONE (OUTPATIENT)
Dept: FAMILY MEDICINE | Facility: CLINIC | Age: 55
End: 2020-01-09

## 2020-01-09 ENCOUNTER — HOSPITAL ENCOUNTER (OUTPATIENT)
Dept: PHYSICAL THERAPY | Facility: CLINIC | Age: 55
Setting detail: THERAPIES SERIES
End: 2020-01-09
Attending: FAMILY MEDICINE
Payer: COMMERCIAL

## 2020-01-09 DIAGNOSIS — Z86.73 HISTORY OF STROKE: ICD-10-CM

## 2020-01-09 DIAGNOSIS — Z79.01 LONG TERM CURRENT USE OF ANTICOAGULANT THERAPY: ICD-10-CM

## 2020-01-09 DIAGNOSIS — I82.409 DVT (DEEP VENOUS THROMBOSIS) (H): ICD-10-CM

## 2020-01-09 DIAGNOSIS — I73.9 PVD (PERIPHERAL VASCULAR DISEASE) WITH CLAUDICATION (H): ICD-10-CM

## 2020-01-09 DIAGNOSIS — I89.0 LYMPHEDEMA OF BOTH LOWER EXTREMITIES: Primary | ICD-10-CM

## 2020-01-09 DIAGNOSIS — D75.1 ERYTHROCYTOSIS: ICD-10-CM

## 2020-01-09 LAB
BASOPHILS # BLD AUTO: 0 10E9/L (ref 0–0.2)
BASOPHILS NFR BLD AUTO: 0.6 %
DIFFERENTIAL METHOD BLD: ABNORMAL
EOSINOPHIL # BLD AUTO: 0.1 10E9/L (ref 0–0.7)
EOSINOPHIL NFR BLD AUTO: 1.5 %
ERYTHROCYTE [DISTWIDTH] IN BLOOD BY AUTOMATED COUNT: 21.9 % (ref 10–15)
HCT VFR BLD AUTO: 42.7 % (ref 35–47)
HGB BLD-MCNC: 13.4 G/DL (ref 11.7–15.7)
INR PPP: 4.4 (ref 0.86–1.14)
LYMPHOCYTES # BLD AUTO: 1.4 10E9/L (ref 0.8–5.3)
LYMPHOCYTES NFR BLD AUTO: 25.5 %
MCH RBC QN AUTO: 25.3 PG (ref 26.5–33)
MCHC RBC AUTO-ENTMCNC: 31.4 G/DL (ref 31.5–36.5)
MCV RBC AUTO: 81 FL (ref 78–100)
MONOCYTES # BLD AUTO: 0.4 10E9/L (ref 0–1.3)
MONOCYTES NFR BLD AUTO: 6.9 %
NEUTROPHILS # BLD AUTO: 3.5 10E9/L (ref 1.6–8.3)
NEUTROPHILS NFR BLD AUTO: 65.5 %
PLATELET # BLD AUTO: 118 10E9/L (ref 150–450)
RBC # BLD AUTO: 5.3 10E12/L (ref 3.8–5.2)
WBC # BLD AUTO: 5.4 10E9/L (ref 4–11)

## 2020-01-09 PROCEDURE — 36415 COLL VENOUS BLD VENIPUNCTURE: CPT | Performed by: INTERNAL MEDICINE

## 2020-01-09 PROCEDURE — 85610 PROTHROMBIN TIME: CPT | Performed by: INTERNAL MEDICINE

## 2020-01-09 PROCEDURE — 97140 MANUAL THERAPY 1/> REGIONS: CPT | Mod: GP | Performed by: PHYSICAL THERAPIST

## 2020-01-09 PROCEDURE — 85025 COMPLETE CBC W/AUTO DIFF WBC: CPT | Performed by: INTERNAL MEDICINE

## 2020-01-09 NOTE — TELEPHONE ENCOUNTER
Anticoagulation Management    Unable to reach [facility] today.    Today's INR result of 4.40 is supratherapeutic (goal INR of 2.0-3.0).  Result received from: Clinic Lab    Follow up required to confirm warfarin dose taken and assess for changes- per chart, pt has increased leg swelling and has been using Tylenol for pain    Left message to hold warfarin tonight.    Anticoagulation clinic to follow up    Eva Kelley RN

## 2020-01-09 NOTE — TELEPHONE ENCOUNTER
"FUROSEMIDE 40MG TABS      Last Written Prescription Date:  12/9/19  Last Fill Quantity: 360,   # refills: 3  Last Office Visit: 12/19/19  Future Office visit:    Next 5 appointments (look out 90 days)    Jan 14, 2020  2:00 PM CST  Return Visit with Kd CAMMY Morris  [facility] ([facility]) [address] [address]  [phone]   Jan 16, 2020  4:00 PM CST  Telephone Visit with Patel Bazzi MD  [facility] ([facility]) [address] [place] [id number] [phone]   Jan 30, 2020  9:30 AM CST  Return Visit with Kd Morris  [facility] ([facility]) [address] [address]  [phone]           Requested Prescriptions   Pending Prescriptions Disp Refills     furosemide (LASIX) 40 MG tablet [Pharmacy Med Name: FUROSEMIDE 40MG TABS] 660 tablet 0     Sig: TAKE ONE TABLET BY MOUTH TWICE A DAY       Diuretics (Including Combos) Protocol Passed - 1/9/2020  5:03 AM        Passed - Blood pressure under 140/90 in past 12 months     BP Readings from Last 3 Encounters:   12/19/19 130/55   12/18/19 128/44   12/11/19 106/44                 Passed - Recent (12 mo) or future (30 days) visit within the authorizing provider's specialty     Patient has had an office visit with the authorizing provider or a provider within the authorizing providers department within the previous 12 mos or has a future within next 30 days. See \"Patient Info\" tab in inbasket, or \"Choose Columns\" in Meds & Orders section of the refill encounter.              Passed - Medication is active on med list        Passed - Patient is age 18 or older        Passed - No active pregancy on record        Passed - Normal serum creatinine on file in past 12 months     Recent Labs   Lab Test 12/11/19  0518   CR 0.75              Passed - Normal serum potassium on file in past 12 " months     Recent Labs   Lab Test 12/11/19  0518   POTASSIUM 4.2                    Passed - Normal serum sodium on file in past 12 months     Recent Labs   Lab Test 12/11/19  0518                 Passed - No positive pregnancy test in past 12 months

## 2020-01-10 ENCOUNTER — ANTICOAGULATION THERAPY VISIT (OUTPATIENT)
Dept: ANTICOAGULATION | Facility: CLINIC | Age: 55
End: 2020-01-10
Payer: COMMERCIAL

## 2020-01-10 DIAGNOSIS — Z86.73 HISTORY OF STROKE: ICD-10-CM

## 2020-01-10 DIAGNOSIS — I82.409 DVT (DEEP VENOUS THROMBOSIS) (H): ICD-10-CM

## 2020-01-10 DIAGNOSIS — I73.9 PVD (PERIPHERAL VASCULAR DISEASE) WITH CLAUDICATION (H): ICD-10-CM

## 2020-01-10 PROCEDURE — 99207 ZZC NO CHARGE NURSE ONLY: CPT

## 2020-01-10 NOTE — TELEPHONE ENCOUNTER
Routing refill request to provider for review/approval because:  Dose being requested is not what is on med list    Brenna FLETCHER RN

## 2020-01-10 NOTE — PROGRESS NOTES
01/10/20  Patient left a VM with [facility]. Patient states she did missed her dose on Monday.  Patient did hold her dose yesterday. Writer will have the patient hold today's dose as well, then resume the maintenance dose.     Reina Forrest RN, BSN, PHN  Anticoagulation Clinic   [phone]        ANTICOAGULATION FOLLOW-UP CLINIC VISIT    Patient Name:  Bia Bill  Date:  1/10/2020  Contact Type:  Telephone/ Left DVM    SUBJECTIVE:  Patient Findings     Positives:   Change in health (Leg swelling), Change in medications (Tylenol)    Comments:   Writer left DVM. Requested a call back to [facility].  Patient was advised to hold her Coumadin dose for today if she DID NOT hold it yesterday. Patient will then resume the maintenance dose.   Recheck in 4 days.         Clinical Outcomes     Comments:   Writer left DVM. Requested a call back to [facility].  Patient was advised to hold her Coumadin dose for today if she DID NOT hold it yesterday. Patient will then resume the maintenance dose.   Recheck in 4 days.            OBJECTIVE    INR   Date Value Ref Range Status   01/09/2020 4.40 (H) 0.86 - 1.14 Final       ASSESSMENT / PLAN  INR assessment SUPRA    Recheck INR In: 4 DAYS    INR Location Outside lab      Anticoagulation Summary  As of 1/10/2020    INR goal:   2.0-3.0   TTR:   34.8 % (11.2 mo)   INR used for dosing:      Warfarin maintenance plan:   12.5 mg (5 mg x 2.5) every day   Full warfarin instructions:   1/10: Hold; Otherwise 12.5 mg every day   Weekly warfarin total:   87.5 mg   Plan last modified:   Reina Forrest RN (12/18/2019)   Next INR check:   1/14/2020   Priority:   High   Target end date:   Indefinite    Indications    PVD (peripheral vascular disease) with claudication (H) [I73.9]  Long term current use of anticoagulant therapy (Resolved) [Z79.01]  DVT (deep venous thrombosis) (H) [I82.409]  History of stroke symptoms  stroke ruled out on imaging [Z86.73]             Anticoagulation Episode Summary      INR check location:       Preferred lab:       Send INR reminders to:   JEROMY SIMPSON [facility] [email or web address] POOL    Comments:   * lab draw due to elevated hematocrit (fingerstick meters don't work). LEFT LE. STENT x 3 LEFT UPPER LEG. Previous arterial clot. AS of 10/9/19 Goal range 2.0-3.0.      Anticoagulation Care Providers     Provider Role Specialty Phone number    Kd Leong MD [facility] [phone]            See the Encounter Report to view Anticoagulation Flowsheet and Dosing Calendar (Go to Encounters tab in chart review, and find the Anticoagulation Therapy Visit)        Reina Forrest RN

## 2020-01-10 NOTE — TELEPHONE ENCOUNTER
ANTICOAGULATION MANAGEMENT     Patient Name:  Bia Bill  Date:  1/10/2020    ASSESSMENT /SUBJECTIVE:      Yesterday's INR result of 4.40 is supratherapeutic. Goal INR of 2.0-3.0      Warfarin dose taken: Missed dose(s) may be affecting INR-pt missed dose     Diet: No new diet changes affecting INR    Medication changes/ interactions: Interaction between Tylenol and warfarin may be affecting INR-taking (2) ES Tylenol every 8hrs for pain (3gms/day)    Previous INR: Subtherapeutic     S/S of bleeding or thromboembolism: No    New injury or illness:  Yes: Pt has chronic lymphedema; legs have been increasingly swollen and painful for at least 2 weeks    Upcoming surgery, procedure or cardioversion:  No    Additional findings: None      PLAN:    Spoke with Bia regarding INR result and instructed:     Warfarin Dosing Instructions: 5mg tonight then continue your current warfarin dose of 12.5mg daily    Instructed patient to follow up no later than: 20 at  appt    Education provided: Yes: Report any s/s of unusual bleeding/bruising immediately.  Regular use of more than 2gm of Tylenol per day can increase INR and bleeding risk.  Increased fluid retention/swelling in legs can also increase INR.      Norah verbalizes understanding and agrees to warfarin dosing plan.    Instructed to call the Anticoagulation Clinic for any changes, questions or concerns. (#[phone])        OBJECTIVE:  INR   Date Value Ref Range Status   2020 4.40 (H) 0.86 - 1.14 Final             Anticoagulation Summary  As of 2020    INR goal:   2.0-3.0   TTR:   34.7 % (11.2 mo)   INR used for dosin.40! (2020)   Warfarin maintenance plan:   12.5 mg (5 mg x 2.5) every day   Full warfarin instructions:   : Hold; 1/10: 5 mg; Otherwise 12.5 mg every day   Weekly warfarin total:   87.5 mg   Plan last modified:   Reina Forrest RN (2019)   Next INR check:   2020   Priority:   High   Target end date:    Indefinite    Indications    PVD (peripheral vascular disease) with claudication (H) [I73.9]  Long term current use of anticoagulant therapy (Resolved) [Z79.01]  DVT (deep venous thrombosis) (H) [I82.409]  History of stroke symptoms  stroke ruled out on imaging [Z86.73]             Anticoagulation Episode Summary     INR check location:       Preferred lab:       Send INR reminders to:   [place] PHONE [employer] POOL    Comments:   * lab draw due to elevated hematocrit (fingerstick meters don't work). LEFT LE. STENT x 3 LEFT UPPER LEG. Previous arterial clot. AS of 10/9/19 Goal range 2.0-3.0.      Anticoagulation Care Providers     Provider Role Specialty Phone number    Kd Leong MD [facility] [phone]

## 2020-01-13 RX ORDER — FUROSEMIDE 40 MG
TABLET ORAL
Qty: 660 TABLET | Refills: 0 | Status: SHIPPED | OUTPATIENT
Start: 2020-01-13 | End: 2020-01-29

## 2020-01-14 ENCOUNTER — TELEPHONE (OUTPATIENT)
Dept: FAMILY MEDICINE | Facility: CLINIC | Age: 55
End: 2020-01-14

## 2020-01-14 ENCOUNTER — PATIENT OUTREACH (OUTPATIENT)
Dept: CARE COORDINATION | Facility: CLINIC | Age: 55
End: 2020-01-14

## 2020-01-14 DIAGNOSIS — I73.9 PVD (PERIPHERAL VASCULAR DISEASE) WITH CLAUDICATION (H): ICD-10-CM

## 2020-01-14 DIAGNOSIS — Z86.73 HISTORY OF STROKE: ICD-10-CM

## 2020-01-14 DIAGNOSIS — I82.409 DVT (DEEP VENOUS THROMBOSIS) (H): ICD-10-CM

## 2020-01-14 DIAGNOSIS — D75.1 ERYTHROCYTOSIS: ICD-10-CM

## 2020-01-14 LAB
BASOPHILS # BLD AUTO: 0 10E9/L (ref 0–0.2)
BASOPHILS NFR BLD AUTO: 0.6 %
DIFFERENTIAL METHOD BLD: ABNORMAL
EOSINOPHIL # BLD AUTO: 0.1 10E9/L (ref 0–0.7)
EOSINOPHIL NFR BLD AUTO: 1.3 %
ERYTHROCYTE [DISTWIDTH] IN BLOOD BY AUTOMATED COUNT: 22.3 % (ref 10–15)
HCT VFR BLD AUTO: 49.1 % (ref 35–47)
HGB BLD-MCNC: 14.1 G/DL (ref 11.7–15.7)
IMM GRANULOCYTES # BLD: 0 10E9/L (ref 0–0.4)
IMM GRANULOCYTES NFR BLD: 0.2 %
INR PPP: 2.31 (ref 0.86–1.14)
LYMPHOCYTES # BLD AUTO: 1.6 10E9/L (ref 0.8–5.3)
LYMPHOCYTES NFR BLD AUTO: 26.2 %
MCH RBC QN AUTO: 25.1 PG (ref 26.5–33)
MCHC RBC AUTO-ENTMCNC: 28.7 G/DL (ref 31.5–36.5)
MCV RBC AUTO: 88 FL (ref 78–100)
MONOCYTES # BLD AUTO: 0.4 10E9/L (ref 0–1.3)
MONOCYTES NFR BLD AUTO: 6.9 %
NEUTROPHILS # BLD AUTO: 4.1 10E9/L (ref 1.6–8.3)
NEUTROPHILS NFR BLD AUTO: 64.8 %
NRBC # BLD AUTO: 0 10*3/UL
NRBC BLD AUTO-RTO: 0 /100
PLATELET # BLD AUTO: 163 10E9/L (ref 150–450)
RBC # BLD AUTO: 5.61 10E12/L (ref 3.8–5.2)
WBC # BLD AUTO: 6.3 10E9/L (ref 4–11)

## 2020-01-14 PROCEDURE — 85610 PROTHROMBIN TIME: CPT | Performed by: FAMILY MEDICINE

## 2020-01-14 PROCEDURE — 85025 COMPLETE CBC W/AUTO DIFF WBC: CPT | Performed by: FAMILY MEDICINE

## 2020-01-14 PROCEDURE — 36415 COLL VENOUS BLD VENIPUNCTURE: CPT | Performed by: FAMILY MEDICINE

## 2020-01-14 NOTE — TELEPHONE ENCOUNTER
ANTICOAGULATION MANAGEMENT     Patient Name:  Bia Bill  Date:  2020    ASSESSMENT /SUBJECTIVE:      Today's INR result of 2.31 is therapeutic. Goal INR of 2.0-3.0      Warfarin dose taken: Warfarin taken as previously instructed    Diet: No new diet changes affecting INR    Medication changes/ interactions: Interaction between Tylenol and warfarin may be affecting INR (taking (2) ES Tylenol 1-2 times daily)    Previous INR: Supratherapeutic     S/S of bleeding or thromboembolism: No    New injury or illness:  No    Upcoming surgery, procedure or cardioversion:  No    Additional findings: Continues to have leg swelling and pain due to lymphadema- swelling and pain have not changed since last INR      PLAN:    Spoke with Bia regarding INR result and instructed:     Warfarin Dosing Instructions: Continue your current warfarin dose of 12.5mg daily    Instructed patient to follow up no later than: 1 week (pt unable to make it to lab until 20-appt scheduled)    Education provided: Yes: INR can increase with use of Tylenol above 2gms/day.  Swelling and pain can also increase INR.    Norah verbalizes understanding and agrees to warfarin dosing plan.    Instructed to call the Anticoagulation Clinic for any changes, questions or concerns. (#[phone])        OBJECTIVE:  INR   Date Value Ref Range Status   2020 2.31 (H) 0.86 - 1.14 Final             Anticoagulation Summary  As of 2020    INR goal:   2.0-3.0   TTR:   34.4 % (11.2 mo)   INR used for dosin.31 (2020)   Warfarin maintenance plan:   12.5 mg (5 mg x 2.5) every day   Full warfarin instructions:   12.5 mg every day   Weekly warfarin total:   87.5 mg   Plan last modified:   Reina Forrest RN (2019)   Next INR check:   2020   Priority:   High   Target end date:   Indefinite    Indications    PVD (peripheral vascular disease) with claudication (H) [I73.9]  Long term current use of anticoagulant therapy  (Resolved) [Z79.01]  DVT (deep venous thrombosis) (H) [I82.409]  History of stroke symptoms  stroke ruled out on imaging [Z86.73]             Anticoagulation Episode Summary     INR check location:       Preferred lab:       Send INR reminders to:   [place] PHONE ANTICOAG POOL    Comments:   * lab draw due to elevated hematocrit (fingerstick meters don't work). LEFT LE. STENT x 3 LEFT UPPER LEG. Previous arterial clot. AS of 10/9/19 Goal range 2.0-3.0.      Anticoagulation Care Providers     Provider Role Specialty Phone number    Kd Leong MD Responsible Family Practice [phone]

## 2020-01-14 NOTE — PROGRESS NOTES
Clinic Care Coordination Contact    Follow Up Progress Note      Assessment: Patient returned clinic care coordinator's call.     Patient states she is feeling much better. This is the first time she has not been gasping for breath during our phone conversation. Patient is able to talk in full sentences.     She state she is getting around the house fine. She does not use any device for ambulation. She does not drive.     Patient states she is using the compression wraps. She states she puts them on and the fluid goes above her knees and stays there. Reviewed Lasix dose.     Patient sates she has a phone appointment with the sleep doctor on 1/6/2020 to discuss the results of her recent visit. She states he told her not to use her old C-pap. They will decide what she needs currently and order. She states he is talking about a bi-pap.     Patient complains of restless legs. She is taking neurontin but does not feel it is helping. We discussed this may improve with new bi-pap at night.  She will discuss with sleep doctor.       Patient has seen: Plan: Patient has followed up with Lymphedema treatments, INR's, psychology x 2, Oncology, PCP and sleep medicine during this review period.     Goals addressed this encounter: Making progress  Goals Addressed                 This Visit's Progress      #1 Medical (pt-stated)   On track     Goal Statement: I want my cellulitis to heal.  Date goal set: 12/5/2019   Measure of Success: Cellulitis will be healed.  Barriers: current infection  Strengths: care coordination, lymphedema therapy  Date to Achieve By: 1/5/20  Patient expressed understanding of goal: yes    Action steps to achieve this goal  1. I will take and finish my antibiotics as prescribed  2. I will elevate my legs as much as possible for the first 48 hours  3. I will follow up with PCP as scheduled         #2 Medical (pt-stated)   On track     Goal Statement: I want my shortness of breath to improve    Date goal  set: 11/1/19    Measure of Success: When I am not short of breath    Barriers: COPD    Strengths: Motivated    Date to Achieve By: February 2020    Patient expressed understanding of goal: Yes    Action steps to achieve this goal  1. I will get my nebulizer fixed  2. I will use nebulizer as instructed  3. I will see PCP 12/11/19 as scheduled  4. I will use my asthma action plan         #3 Improve chronic symptoms (pt-stated)   40%     Goal Statement: I want my edema to improve.  Date goal set: 12/5/2019   Measure of Success: I will be able to put support stockings on by myself.  Barriers: lymphedema  Strengths: care coordination, lymphedema clinic  Date to Achieve By: 3/1/20  Patient expressed understanding of goal: yes    Action steps to achieve this goal  1. I will elevate my legs throughout the day  2. I will attend lymphedema clinic appointments  3. I will take my diuretics as prescribed         #4 Monitoring (pt-stated)   40%     Goal Statement: I will weigh myself every day.  Date goal set: 12/5/2019   Measure of Success: Consistent record of daily weights.  Barriers: Does not like scale  Strengths: care coordination support  Date to Achieve By: 2/11/20  Patient expressed understanding of goal: yes    Action steps to achieve this goal  1. I will weigh myself every morning.  2. I will write my weight down in a notebook.  3. I will take an extra 20-40 mg of Lasix if my weight increases by 3 lbs.         Intervention/Education provided during outreach: As above     Outreach Frequency: monthly    Plan:   Patient will continue current treatment plan. She will attend appointments as scheduled.     Care Coordinator will follow up in one month.     Michela Whitaker RN, [facility] RN Coordinator  [facility]   1/14/2020    11:13 AM  [phone]

## 2020-01-19 NOTE — TELEPHONE ENCOUNTER
I sent in zofran PRN. Could be getting nauseous from doxycycline.  Please make sure she uses dulera 2 puffs two times daily, using duoneb every 6 hours.  Could continue the prednisone course if she felt like it helped then worsened when she stopped.  Recommend that she follow up if not improving with this.  If she has CP or SOB she should go to ED, has history of DVT.  Ayanna Hoff PA-C    <<----- Click to add NO significant Past Surgical History

## 2020-01-20 ENCOUNTER — TELEPHONE (OUTPATIENT)
Dept: PHARMACY | Facility: CLINIC | Age: 55
End: 2020-01-20

## 2020-01-22 ENCOUNTER — APPOINTMENT (OUTPATIENT)
Dept: GENERAL RADIOLOGY | Facility: CLINIC | Age: 55
End: 2020-01-22
Attending: EMERGENCY MEDICINE
Payer: COMMERCIAL

## 2020-01-22 ENCOUNTER — HOSPITAL ENCOUNTER (OUTPATIENT)
Facility: CLINIC | Age: 55
Setting detail: OBSERVATION
Discharge: HOME | End: 2020-01-23
Attending: EMERGENCY MEDICINE
Payer: COMMERCIAL

## 2020-01-22 DIAGNOSIS — J20.9 ACUTE BRONCHITIS, UNSPECIFIED ORGANISM: Primary | ICD-10-CM

## 2020-01-22 DIAGNOSIS — Z87.891 HISTORY OF SMOKING: ICD-10-CM

## 2020-01-22 DIAGNOSIS — J44.1 COPD EXACERBATION (H): ICD-10-CM

## 2020-01-22 DIAGNOSIS — J44.9 CHRONIC OBSTRUCTIVE PULMONARY DISEASE, UNSPECIFIED COPD TYPE (H): Chronic | ICD-10-CM

## 2020-01-22 LAB
ALBUMIN SERPL-MCNC: 3 G/DL (ref 3.4–5)
ALP SERPL-CCNC: 133 U/L (ref 40–150)
ALT SERPL W P-5'-P-CCNC: 16 U/L (ref 0–50)
ANION GAP SERPL CALCULATED.3IONS-SCNC: 6 MMOL/L (ref 3–14)
AST SERPL W P-5'-P-CCNC: 8 U/L (ref 0–45)
BASE EXCESS BLDV CALC-SCNC: 8.6 MMOL/L
BASOPHILS # BLD AUTO: 0 10E9/L (ref 0–0.2)
BASOPHILS NFR BLD AUTO: 0.6 %
BILIRUB SERPL-MCNC: 0.4 MG/DL (ref 0.2–1.3)
BUN SERPL-MCNC: 8 MG/DL (ref 7–30)
CALCIUM SERPL-MCNC: 8.7 MG/DL (ref 8.5–10.1)
CHLORIDE SERPL-SCNC: 104 MMOL/L (ref 94–109)
CO2 SERPL-SCNC: 33 MMOL/L (ref 20–32)
CREAT SERPL-MCNC: 0.82 MG/DL (ref 0.52–1.04)
DIFFERENTIAL METHOD BLD: ABNORMAL
EOSINOPHIL # BLD AUTO: 0.1 10E9/L (ref 0–0.7)
EOSINOPHIL NFR BLD AUTO: 0.9 %
ERYTHROCYTE [DISTWIDTH] IN BLOOD BY AUTOMATED COUNT: 21.3 % (ref 10–15)
FLUAV+FLUBV AG SPEC QL: NEGATIVE
FLUAV+FLUBV AG SPEC QL: NEGATIVE
GFR SERPL CREATININE-BSD FRML MDRD: 81 ML/MIN/{1.73_M2}
GLUCOSE SERPL-MCNC: 152 MG/DL (ref 70–99)
HCO3 BLDV-SCNC: 35 MMOL/L (ref 21–28)
HCT VFR BLD AUTO: 44.7 % (ref 35–47)
HGB BLD-MCNC: 13 G/DL (ref 11.7–15.7)
IMM GRANULOCYTES # BLD: 0 10E9/L (ref 0–0.4)
IMM GRANULOCYTES NFR BLD: 0.6 %
LYMPHOCYTES # BLD AUTO: 1.4 10E9/L (ref 0.8–5.3)
LYMPHOCYTES NFR BLD AUTO: 25.9 %
MCH RBC QN AUTO: 24.9 PG (ref 26.5–33)
MCHC RBC AUTO-ENTMCNC: 29.1 G/DL (ref 31.5–36.5)
MCV RBC AUTO: 86 FL (ref 78–100)
MONOCYTES # BLD AUTO: 0.4 10E9/L (ref 0–1.3)
MONOCYTES NFR BLD AUTO: 6.9 %
NEUTROPHILS # BLD AUTO: 3.5 10E9/L (ref 1.6–8.3)
NEUTROPHILS NFR BLD AUTO: 65.1 %
NRBC # BLD AUTO: 0 10*3/UL
NRBC BLD AUTO-RTO: 0 /100
NT-PROBNP SERPL-MCNC: 168 PG/ML (ref 0–900)
O2/TOTAL GAS SETTING VFR VENT: ABNORMAL %
PCO2 BLDV: 54 MM HG (ref 40–50)
PH BLDV: 7.42 PH (ref 7.32–7.43)
PLATELET # BLD AUTO: 125 10E9/L (ref 150–450)
PO2 BLDV: 23 MM HG (ref 25–47)
POTASSIUM SERPL-SCNC: 3.2 MMOL/L (ref 3.4–5.3)
PROT SERPL-MCNC: 6.1 G/DL (ref 6.8–8.8)
RBC # BLD AUTO: 5.22 10E12/L (ref 3.8–5.2)
SODIUM SERPL-SCNC: 143 MMOL/L (ref 133–144)
SPECIMEN SOURCE: NORMAL
WBC # BLD AUTO: 5.3 10E9/L (ref 4–11)

## 2020-01-22 PROCEDURE — 99285 EMERGENCY DEPT VISIT HI MDM: CPT | Mod: 25 | Performed by: EMERGENCY MEDICINE

## 2020-01-22 PROCEDURE — 85610 PROTHROMBIN TIME: CPT

## 2020-01-22 PROCEDURE — 25000132 ZZH RX MED GY IP 250 OP 250 PS 637: Performed by: EMERGENCY MEDICINE

## 2020-01-22 PROCEDURE — G0378 HOSPITAL OBSERVATION PER HR: HCPCS

## 2020-01-22 PROCEDURE — 25000125 ZZHC RX 250

## 2020-01-22 PROCEDURE — 94640 AIRWAY INHALATION TREATMENT: CPT | Mod: 76

## 2020-01-22 PROCEDURE — 25000125 ZZHC RX 250: Performed by: EMERGENCY MEDICINE

## 2020-01-22 PROCEDURE — 93005 ELECTROCARDIOGRAM TRACING: CPT | Performed by: EMERGENCY MEDICINE

## 2020-01-22 PROCEDURE — 83880 ASSAY OF NATRIURETIC PEPTIDE: CPT | Performed by: EMERGENCY MEDICINE

## 2020-01-22 PROCEDURE — 85025 COMPLETE CBC W/AUTO DIFF WBC: CPT | Performed by: EMERGENCY MEDICINE

## 2020-01-22 PROCEDURE — 87804 INFLUENZA ASSAY W/OPTIC: CPT | Performed by: EMERGENCY MEDICINE

## 2020-01-22 PROCEDURE — 94640 AIRWAY INHALATION TREATMENT: CPT

## 2020-01-22 PROCEDURE — 82803 BLOOD GASES ANY COMBINATION: CPT | Performed by: EMERGENCY MEDICINE

## 2020-01-22 PROCEDURE — 71046 X-RAY EXAM CHEST 2 VIEWS: CPT

## 2020-01-22 PROCEDURE — 80053 COMPREHEN METABOLIC PANEL: CPT | Performed by: EMERGENCY MEDICINE

## 2020-01-22 PROCEDURE — 40000275 ZZH STATISTIC RCP TIME EA 10 MIN

## 2020-01-22 PROCEDURE — 25000131 ZZH RX MED GY IP 250 OP 636 PS 637: Performed by: EMERGENCY MEDICINE

## 2020-01-22 PROCEDURE — 93010 ELECTROCARDIOGRAM REPORT: CPT | Mod: Z6 | Performed by: EMERGENCY MEDICINE

## 2020-01-22 RX ORDER — GABAPENTIN 300 MG/1
900 CAPSULE ORAL AT BEDTIME
Status: DISCONTINUED | OUTPATIENT
Start: 2020-01-22 | End: 2020-01-23

## 2020-01-22 RX ORDER — PREDNISONE 20 MG/1
40 TABLET ORAL ONCE
Status: COMPLETED | OUTPATIENT
Start: 2020-01-22 | End: 2020-01-22

## 2020-01-22 RX ORDER — PRAMIPEXOLE DIHYDROCHLORIDE 0.12 MG/1
0.12 TABLET ORAL 3 TIMES DAILY
Status: DISCONTINUED | OUTPATIENT
Start: 2020-01-22 | End: 2020-01-23 | Stop reason: DRUGHIGH

## 2020-01-22 RX ORDER — IPRATROPIUM BROMIDE AND ALBUTEROL SULFATE 2.5; .5 MG/3ML; MG/3ML
3 SOLUTION RESPIRATORY (INHALATION) ONCE
Status: COMPLETED | OUTPATIENT
Start: 2020-01-22 | End: 2020-01-22

## 2020-01-22 RX ORDER — DOXYCYCLINE 100 MG/1
100 CAPSULE ORAL EVERY 12 HOURS SCHEDULED
Status: DISCONTINUED | OUTPATIENT
Start: 2020-01-22 | End: 2020-01-23 | Stop reason: HOSPADM

## 2020-01-22 RX ORDER — IPRATROPIUM BROMIDE AND ALBUTEROL SULFATE 2.5; .5 MG/3ML; MG/3ML
SOLUTION RESPIRATORY (INHALATION)
Status: COMPLETED
Start: 2020-01-22 | End: 2020-01-22

## 2020-01-22 RX ADMIN — IPRATROPIUM BROMIDE AND ALBUTEROL SULFATE 3 ML: .5; 3 SOLUTION RESPIRATORY (INHALATION) at 22:57

## 2020-01-22 RX ADMIN — DOXYCYCLINE 100 MG: 100 CAPSULE ORAL at 23:04

## 2020-01-22 RX ADMIN — PREDNISONE 40 MG: 20 TABLET ORAL at 23:04

## 2020-01-22 RX ADMIN — PRAMIPEXOLE DIHYDROCHLORIDE 0.12 MG: 0.12 TABLET ORAL at 23:06

## 2020-01-22 RX ADMIN — GABAPENTIN 900 MG: 300 CAPSULE ORAL at 23:06

## 2020-01-22 RX ADMIN — IPRATROPIUM BROMIDE AND ALBUTEROL SULFATE 3 ML: .5; 3 SOLUTION RESPIRATORY (INHALATION) at 20:54

## 2020-01-22 RX ADMIN — IPRATROPIUM BROMIDE AND ALBUTEROL SULFATE 3 ML: 2.5; .5 SOLUTION RESPIRATORY (INHALATION) at 22:57

## 2020-01-23 VITALS
HEIGHT: 58 IN | RESPIRATION RATE: 16 BRPM | WEIGHT: 242.29 LBS | DIASTOLIC BLOOD PRESSURE: 60 MMHG | SYSTOLIC BLOOD PRESSURE: 143 MMHG | OXYGEN SATURATION: 95 % | TEMPERATURE: 97.8 F | HEART RATE: 88 BPM | BODY MASS INDEX: 50.86 KG/M2

## 2020-01-23 PROBLEM — J40 BRONCHITIS: Status: RESOLVED | Noted: 2019-11-09 | Resolved: 2020-01-23

## 2020-01-23 PROBLEM — R06.00 DYSPNEA: Status: ACTIVE | Noted: 2020-01-23

## 2020-01-23 PROBLEM — M1A.3490: Status: ACTIVE | Noted: 2018-10-03

## 2020-01-23 PROBLEM — L03.90 CELLULITIS: Status: RESOLVED | Noted: 2019-06-06 | Resolved: 2020-01-23

## 2020-01-23 PROBLEM — J44.1 COPD EXACERBATION (H): Status: ACTIVE | Noted: 2019-10-29

## 2020-01-23 PROBLEM — J96.01 ACUTE RESPIRATORY FAILURE WITH HYPOXIA AND HYPERCAPNIA (H): Status: RESOLVED | Noted: 2019-10-29 | Resolved: 2020-01-23

## 2020-01-23 PROBLEM — I89.0 LYMPHEDEMA OF BOTH LOWER EXTREMITIES: Status: ACTIVE | Noted: 2019-10-29

## 2020-01-23 PROBLEM — R21 RASH: Status: RESOLVED | Noted: 2019-12-10 | Resolved: 2020-01-23

## 2020-01-23 PROBLEM — L08.9 SKIN INFECTION: Status: RESOLVED | Noted: 2019-12-10 | Resolved: 2020-01-23

## 2020-01-23 PROBLEM — E11.9 TYPE 2 DIABETES MELLITUS (H): Status: ACTIVE | Noted: 2019-12-10

## 2020-01-23 PROBLEM — J96.02 ACUTE RESPIRATORY FAILURE WITH HYPOXIA AND HYPERCAPNIA (H): Status: RESOLVED | Noted: 2019-10-29 | Resolved: 2020-01-23

## 2020-01-23 PROBLEM — Z86.718 PERSONAL HISTORY OF DVT (DEEP VEIN THROMBOSIS): Status: RESOLVED | Noted: 2019-10-29 | Resolved: 2020-01-23

## 2020-01-23 LAB
ANION GAP SERPL CALCULATED.3IONS-SCNC: 6 MMOL/L (ref 3–14)
BUN SERPL-MCNC: 8 MG/DL (ref 7–30)
CALCIUM SERPL-MCNC: 8.5 MG/DL (ref 8.5–10.1)
CHLORIDE SERPL-SCNC: 104 MMOL/L (ref 94–109)
CO2 SERPL-SCNC: 31 MMOL/L (ref 20–32)
CREAT SERPL-MCNC: 0.66 MG/DL (ref 0.52–1.04)
ERYTHROCYTE [DISTWIDTH] IN BLOOD BY AUTOMATED COUNT: 21.5 % (ref 10–15)
GFR SERPL CREATININE-BSD FRML MDRD: >90 ML/MIN/{1.73_M2}
GLUCOSE BLDC GLUCOMTR-MCNC: 158 MG/DL (ref 70–99)
GLUCOSE BLDC GLUCOMTR-MCNC: 396 MG/DL (ref 70–99)
GLUCOSE BLDC GLUCOMTR-MCNC: 406 MG/DL (ref 70–99)
GLUCOSE SERPL-MCNC: 237 MG/DL (ref 70–99)
HBA1C MFR BLD: 7.2 % (ref 0–5.6)
HCT VFR BLD AUTO: 47 % (ref 35–47)
HGB BLD-MCNC: 13.5 G/DL (ref 11.7–15.7)
INR PPP: 3.2 (ref 0.86–1.14)
INR PPP: 4.21 (ref 0.86–1.14)
MCH RBC QN AUTO: 25.1 PG (ref 26.5–33)
MCHC RBC AUTO-ENTMCNC: 28.7 G/DL (ref 31.5–36.5)
MCV RBC AUTO: 87 FL (ref 78–100)
MRSA DNA SPEC QL NAA+PROBE: NEGATIVE
PLATELET # BLD AUTO: 117 10E9/L (ref 150–450)
POTASSIUM SERPL-SCNC: 3.7 MMOL/L (ref 3.4–5.3)
RBC # BLD AUTO: 5.38 10E12/L (ref 3.8–5.2)
SODIUM SERPL-SCNC: 141 MMOL/L (ref 133–144)
SPECIMEN SOURCE: NORMAL
WBC # BLD AUTO: 5.8 10E9/L (ref 4–11)

## 2020-01-23 PROCEDURE — 99207 ZZC CDG-CODE CATEGORY CHANGED: CPT | Performed by: PHYSICIAN ASSISTANT

## 2020-01-23 PROCEDURE — 94640 AIRWAY INHALATION TREATMENT: CPT | Mod: 76

## 2020-01-23 PROCEDURE — 96372 THER/PROPH/DIAG INJ SC/IM: CPT

## 2020-01-23 PROCEDURE — 25000132 ZZH RX MED GY IP 250 OP 250 PS 637

## 2020-01-23 PROCEDURE — 36415 COLL VENOUS BLD VENIPUNCTURE: CPT

## 2020-01-23 PROCEDURE — 87640 STAPH A DNA AMP PROBE: CPT

## 2020-01-23 PROCEDURE — 83036 HEMOGLOBIN GLYCOSYLATED A1C: CPT | Performed by: PHYSICIAN ASSISTANT

## 2020-01-23 PROCEDURE — 87641 MR-STAPH DNA AMP PROBE: CPT

## 2020-01-23 PROCEDURE — 85610 PROTHROMBIN TIME: CPT | Performed by: INTERNAL MEDICINE

## 2020-01-23 PROCEDURE — 25000131 ZZH RX MED GY IP 250 OP 636 PS 637: Performed by: PHYSICIAN ASSISTANT

## 2020-01-23 PROCEDURE — 99236 HOSP IP/OBS SAME DATE HI 85: CPT | Performed by: PHYSICIAN ASSISTANT

## 2020-01-23 PROCEDURE — 85027 COMPLETE CBC AUTOMATED: CPT

## 2020-01-23 PROCEDURE — 25000132 ZZH RX MED GY IP 250 OP 250 PS 637: Performed by: EMERGENCY MEDICINE

## 2020-01-23 PROCEDURE — 25000125 ZZHC RX 250

## 2020-01-23 PROCEDURE — G0378 HOSPITAL OBSERVATION PER HR: HCPCS

## 2020-01-23 PROCEDURE — 80048 BASIC METABOLIC PNL TOTAL CA: CPT

## 2020-01-23 PROCEDURE — 25000132 ZZH RX MED GY IP 250 OP 250 PS 637: Performed by: INTERNAL MEDICINE

## 2020-01-23 PROCEDURE — 25000131 ZZH RX MED GY IP 250 OP 636 PS 637

## 2020-01-23 PROCEDURE — 40000275 ZZH STATISTIC RCP TIME EA 10 MIN

## 2020-01-23 PROCEDURE — 00000146 ZZHCL STATISTIC GLUCOSE BY METER IP

## 2020-01-23 RX ORDER — ACETAMINOPHEN 650 MG/1
650 SUPPOSITORY RECTAL EVERY 4 HOURS PRN
Status: DISCONTINUED | OUTPATIENT
Start: 2020-01-23 | End: 2020-01-23 | Stop reason: HOSPADM

## 2020-01-23 RX ORDER — PROCHLORPERAZINE MALEATE 10 MG
10 TABLET ORAL EVERY 6 HOURS PRN
Status: DISCONTINUED | OUTPATIENT
Start: 2020-01-23 | End: 2020-01-23 | Stop reason: HOSPADM

## 2020-01-23 RX ORDER — BUPROPION HYDROCHLORIDE 150 MG/1
150 TABLET ORAL DAILY
Status: DISCONTINUED | OUTPATIENT
Start: 2020-01-23 | End: 2020-01-23 | Stop reason: HOSPADM

## 2020-01-23 RX ORDER — ACETAMINOPHEN 325 MG/1
650 TABLET ORAL EVERY 4 HOURS PRN
Status: DISCONTINUED | OUTPATIENT
Start: 2020-01-23 | End: 2020-01-23 | Stop reason: HOSPADM

## 2020-01-23 RX ORDER — ONDANSETRON 4 MG/1
4 TABLET, ORALLY DISINTEGRATING ORAL EVERY 6 HOURS PRN
Status: DISCONTINUED | OUTPATIENT
Start: 2020-01-23 | End: 2020-01-23 | Stop reason: HOSPADM

## 2020-01-23 RX ORDER — ALBUTEROL SULFATE 0.83 MG/ML
2.5 SOLUTION RESPIRATORY (INHALATION)
Status: DISCONTINUED | OUTPATIENT
Start: 2020-01-23 | End: 2020-01-23 | Stop reason: HOSPADM

## 2020-01-23 RX ORDER — POLYETHYLENE GLYCOL 3350 17 G/17G
17 POWDER, FOR SOLUTION ORAL DAILY PRN
Status: DISCONTINUED | OUTPATIENT
Start: 2020-01-23 | End: 2020-01-23 | Stop reason: HOSPADM

## 2020-01-23 RX ORDER — AMOXICILLIN 250 MG
1 CAPSULE ORAL 2 TIMES DAILY PRN
Status: DISCONTINUED | OUTPATIENT
Start: 2020-01-23 | End: 2020-01-23 | Stop reason: HOSPADM

## 2020-01-23 RX ORDER — PREDNISONE 20 MG/1
40 TABLET ORAL DAILY
Status: DISCONTINUED | OUTPATIENT
Start: 2020-01-23 | End: 2020-01-23 | Stop reason: HOSPADM

## 2020-01-23 RX ORDER — PRAMIPEXOLE DIHYDROCHLORIDE 0.12 MG/1
.125-2.5 TABLET ORAL AT BEDTIME
Status: DISCONTINUED | OUTPATIENT
Start: 2020-01-23 | End: 2020-01-23 | Stop reason: DRUGHIGH

## 2020-01-23 RX ORDER — ALLOPURINOL 300 MG/1
300 TABLET ORAL DAILY
Status: DISCONTINUED | OUTPATIENT
Start: 2020-01-23 | End: 2020-01-23 | Stop reason: HOSPADM

## 2020-01-23 RX ORDER — LAMOTRIGINE 100 MG/1
100 TABLET ORAL AT BEDTIME
Status: DISCONTINUED | OUTPATIENT
Start: 2020-01-23 | End: 2020-01-23 | Stop reason: HOSPADM

## 2020-01-23 RX ORDER — GABAPENTIN 300 MG/1
300 CAPSULE ORAL AT BEDTIME
Status: DISCONTINUED | OUTPATIENT
Start: 2020-01-23 | End: 2020-01-23 | Stop reason: HOSPADM

## 2020-01-23 RX ORDER — AMOXICILLIN 250 MG
2 CAPSULE ORAL 2 TIMES DAILY PRN
Status: DISCONTINUED | OUTPATIENT
Start: 2020-01-23 | End: 2020-01-23 | Stop reason: HOSPADM

## 2020-01-23 RX ORDER — PRAMIPEXOLE DIHYDROCHLORIDE 0.12 MG/1
.125-.25 TABLET ORAL AT BEDTIME
Status: DISCONTINUED | OUTPATIENT
Start: 2020-01-23 | End: 2020-01-23 | Stop reason: HOSPADM

## 2020-01-23 RX ORDER — SIMVASTATIN 20 MG
20 TABLET ORAL AT BEDTIME
Status: DISCONTINUED | OUTPATIENT
Start: 2020-01-23 | End: 2020-01-23 | Stop reason: HOSPADM

## 2020-01-23 RX ORDER — NICOTINE POLACRILEX 4 MG
15-30 LOZENGE BUCCAL
Status: DISCONTINUED | OUTPATIENT
Start: 2020-01-23 | End: 2020-01-23 | Stop reason: HOSPADM

## 2020-01-23 RX ORDER — WARFARIN SODIUM 5 MG/1
5 TABLET ORAL
Status: COMPLETED | OUTPATIENT
Start: 2020-01-23 | End: 2020-01-23

## 2020-01-23 RX ORDER — ONDANSETRON 2 MG/ML
4 INJECTION INTRAMUSCULAR; INTRAVENOUS EVERY 6 HOURS PRN
Status: DISCONTINUED | OUTPATIENT
Start: 2020-01-23 | End: 2020-01-23 | Stop reason: HOSPADM

## 2020-01-23 RX ORDER — DEXTROSE MONOHYDRATE 25 G/50ML
25-50 INJECTION, SOLUTION INTRAVENOUS
Status: DISCONTINUED | OUTPATIENT
Start: 2020-01-23 | End: 2020-01-23 | Stop reason: HOSPADM

## 2020-01-23 RX ORDER — POTASSIUM CHLORIDE 750 MG/1
10 TABLET, EXTENDED RELEASE ORAL DAILY
Status: DISCONTINUED | OUTPATIENT
Start: 2020-01-23 | End: 2020-01-23 | Stop reason: HOSPADM

## 2020-01-23 RX ORDER — FUROSEMIDE 40 MG
40 TABLET ORAL 2 TIMES DAILY
Status: DISCONTINUED | OUTPATIENT
Start: 2020-01-23 | End: 2020-01-23 | Stop reason: HOSPADM

## 2020-01-23 RX ORDER — GABAPENTIN 600 MG/1
600 TABLET ORAL 3 TIMES DAILY
Status: DISCONTINUED | OUTPATIENT
Start: 2020-01-23 | End: 2020-01-23 | Stop reason: HOSPADM

## 2020-01-23 RX ORDER — PREDNISONE 5 MG/1
TABLET ORAL
Qty: 25 TABLET | Refills: 0 | Status: SHIPPED | OUTPATIENT
Start: 2020-01-23 | End: 2020-05-12

## 2020-01-23 RX ORDER — NALOXONE HYDROCHLORIDE 0.4 MG/ML
.1-.4 INJECTION, SOLUTION INTRAMUSCULAR; INTRAVENOUS; SUBCUTANEOUS
Status: DISCONTINUED | OUTPATIENT
Start: 2020-01-23 | End: 2020-01-23 | Stop reason: HOSPADM

## 2020-01-23 RX ORDER — PROCHLORPERAZINE 25 MG
25 SUPPOSITORY, RECTAL RECTAL EVERY 12 HOURS PRN
Status: DISCONTINUED | OUTPATIENT
Start: 2020-01-23 | End: 2020-01-23 | Stop reason: HOSPADM

## 2020-01-23 RX ORDER — ARIPIPRAZOLE 10 MG/1
10 TABLET ORAL EVERY EVENING
Status: DISCONTINUED | OUTPATIENT
Start: 2020-01-23 | End: 2020-01-23 | Stop reason: HOSPADM

## 2020-01-23 RX ORDER — IPRATROPIUM BROMIDE AND ALBUTEROL SULFATE 2.5; .5 MG/3ML; MG/3ML
3 SOLUTION RESPIRATORY (INHALATION)
Status: DISCONTINUED | OUTPATIENT
Start: 2020-01-23 | End: 2020-01-23 | Stop reason: HOSPADM

## 2020-01-23 RX ADMIN — LEVOTHYROXINE SODIUM 175 MCG: 125 TABLET ORAL at 07:00

## 2020-01-23 RX ADMIN — IPRATROPIUM BROMIDE AND ALBUTEROL SULFATE 3 ML: .5; 3 SOLUTION RESPIRATORY (INHALATION) at 17:00

## 2020-01-23 RX ADMIN — POTASSIUM CHLORIDE 10 MEQ: 750 TABLET, EXTENDED RELEASE ORAL at 08:04

## 2020-01-23 RX ADMIN — GABAPENTIN 600 MG: 600 TABLET, FILM COATED ORAL at 08:03

## 2020-01-23 RX ADMIN — ALLOPURINOL 300 MG: 300 TABLET ORAL at 08:03

## 2020-01-23 RX ADMIN — LAMOTRIGINE 100 MG: 100 TABLET ORAL at 02:22

## 2020-01-23 RX ADMIN — INSULIN ASPART 4 UNITS: 100 INJECTION, SOLUTION INTRAVENOUS; SUBCUTANEOUS at 17:07

## 2020-01-23 RX ADMIN — ARIPIPRAZOLE 10 MG: 10 TABLET ORAL at 17:08

## 2020-01-23 RX ADMIN — FUROSEMIDE 40 MG: 40 TABLET ORAL at 08:04

## 2020-01-23 RX ADMIN — SIMVASTATIN 20 MG: 20 TABLET, FILM COATED ORAL at 02:22

## 2020-01-23 RX ADMIN — OMEPRAZOLE 20 MG: 20 CAPSULE, DELAYED RELEASE ORAL at 02:22

## 2020-01-23 RX ADMIN — TRAZODONE HYDROCHLORIDE 150 MG: 100 TABLET ORAL at 02:22

## 2020-01-23 RX ADMIN — IPRATROPIUM BROMIDE AND ALBUTEROL SULFATE 3 ML: .5; 3 SOLUTION RESPIRATORY (INHALATION) at 12:16

## 2020-01-23 RX ADMIN — BUPROPION HYDROCHLORIDE 150 MG: 150 TABLET, FILM COATED, EXTENDED RELEASE ORAL at 08:04

## 2020-01-23 RX ADMIN — GABAPENTIN 600 MG: 600 TABLET, FILM COATED ORAL at 14:28

## 2020-01-23 RX ADMIN — INSULIN ASPART 6 UNITS: 100 INJECTION, SOLUTION INTRAVENOUS; SUBCUTANEOUS at 11:38

## 2020-01-23 RX ADMIN — ACETAMINOPHEN 650 MG: 325 TABLET, FILM COATED ORAL at 02:25

## 2020-01-23 RX ADMIN — PRAMIPEXOLE DIHYDROCHLORIDE 0.12 MG: 0.12 TABLET ORAL at 02:22

## 2020-01-23 RX ADMIN — IPRATROPIUM BROMIDE AND ALBUTEROL SULFATE 3 ML: .5; 3 SOLUTION RESPIRATORY (INHALATION) at 06:06

## 2020-01-23 RX ADMIN — PREDNISONE 40 MG: 20 TABLET ORAL at 08:04

## 2020-01-23 RX ADMIN — DOXYCYCLINE 100 MG: 100 CAPSULE ORAL at 08:03

## 2020-01-23 RX ADMIN — OMEPRAZOLE 20 MG: 20 CAPSULE, DELAYED RELEASE ORAL at 08:04

## 2020-01-23 RX ADMIN — FLUTICASONE FUROATE AND VILANTEROL TRIFENATATE 1 PUFF: 200; 25 POWDER RESPIRATORY (INHALATION) at 08:05

## 2020-01-23 RX ADMIN — WARFARIN SODIUM 5 MG: 5 TABLET ORAL at 17:57

## 2020-01-23 RX ADMIN — ARIPIPRAZOLE 10 MG: 10 TABLET ORAL at 02:22

## 2020-01-23 ASSESSMENT — MIFFLIN-ST. JEOR
SCORE: 1593.75
SCORE: 1593.75

## 2020-01-23 NOTE — PROGRESS NOTES
Writer has reviewed initial/admission skin assessment and saurabh assessment and agrees with documentation and assessment findings.

## 2020-01-23 NOTE — DISCHARGE SUMMARY
[facility]  Hospitalist Discharge Summary       Date of Admission:  1/22/2020  Date of Discharge:  No discharge date for patient encounter.  Discharging Provider: Ayanna Proctor PA-C    Discharge Diagnoses   Acute bronchitis  Elevated blood sugar    Follow-ups Needed After Discharge   Follow-up Appointments     Follow-up and recommended labs and tests       Follow up with primary care provider, Kd Leong, within 7 days for   hospital follow- up.  Follow up blood sugar test recommended.          Unresulted Labs Ordered in the Past 30 Days of this Admission     No orders found for last 31 day(s).        Discharge Disposition   Discharged to home  Condition at discharge: Stable    Hospital Course   Bia Bill is a 54 year old female admitted on 1/22/2020. She has history of COPD/restrictive lung disease, lymphedema, PVD, bipolar/depression, erythrocytosis, gout, DVT, GERD, RLS, obesity and GILES. She presents with shortness of breath.    Acute Bronchitis  2 days of shortness of breath, chills and dry cough. Not requiring oxygen. Lung sounds distance and diminished on exam, faint wheeze with forced expiration. Labs unremarkable. Rapid influenza negative. CXR shows interstitial prominence that does not look too different from previous chest x-rays, BNP is low. Symptomatically, sounds consistent with acute bronchitis. CHF also considered, lower suspicion though volume status difficult to assess, weight overall appears stable. Patient continues to feel better with nebulizers and steroids, feels ready for discharge today.  - ordered new nebulizer machine to use at home, refills available of duoneb solution  - prednisone taper: 20mg x 4 days, 10 mg x 3 days, 5 mg x 3 days  - continue home duonebs Q4-6 hours prn  - follow up with PCP early next week    COPD / Asthma / Restrictive Lung disease  Tobacco use  PFTs in 2018 consistent with restrictive process however there was borderline  improvement with bronchodilators suggesting that obstruction may also be present.  - continue home nebulizer treatments prn  - continue home Dulera while observation     Diabetes Mellitus, Type II  Chronic.  Last a1C 7.2% on 1/2020. Managed at home with diet alone though now A1c higher. Blood sugar of 401 following prednsone. Covered with 6 units at that time and then an additional 12 units prior to dinner. Plan to reduce dose of prednisone, anticipate blood sugars will improve with that. Discussed healthy diet.   - continue diabetic diet  - follow up with PCP for recheck of blood sugar consideration of oral diabetic medicaiton     Hypoventilation, GILES  Morbid Obesity  Chronic. Managed with home CPAP.  - continue CPAP    Lymphedema  No prior history of CHF. Last echo 2018 showed mild LVH, normal EF, normal RV and LV size/function.  - continue home furosemide    Peripheral Vascular Disease  Noted in history of stenting in the left leg.  - continue home warfarin, statin       Bipolar Disorder / Depression  Follows with psychology, [facility].   - continue home Lamictal, Abilify, bupropion, trazodone      Erythrocytosis  Hgb 13.0, hematocrit 44.7. Follows with hematology, Dr. Watts, history of phlebotomy in the past (2011) to keep Hgb < 20, possibly Hb [place] based on previous work up.  - continue outpatient follow up     Gout  Chronic.  - continue home allopurinol     Gastroesophageal reflux disease  Chronic.  - continue home omeprazole     History of DVT  DVT in 1992 post-partum, recurrence in 2004 due to decreased mobility. . Managed on warfarin. INR supratherapeutic on admit.  - continue home warfarin, pharmacy to dose     Hypothyroidism  Chronic.   - continue home levothyroxine     Restless Legs  Polyneuropathy  Chronic.  - continue home pramipexole, gabapentin    Consultations This Hospital Stay   PHARMACY TO DOSE WARFARIN  CARE TRANSITION RN/SW IP CONSULT    Code Status   Full Code    Time  Spent on this Encounter   I, Ayanna Proctor PA-C, personally saw the patient today and spent greater than 30 minutes discharging this patient.       Ayanna Proctor PA-C  [facility]  ______________________________________________________________________    Physical Exam   Vital Signs: Temp: 97.8  F (36.6  C) Temp src: Oral BP: (!) 143/60 Pulse: 88   Resp: 16 SpO2: 95 % O2 Device: None (Room air) Oxygen Delivery: 2 LPM  Weight: 242 lbs 4.57 oz  Constitutional: awake, alert, cooperative, no apparent distress, and appears stated age  Respiratory: No increased work of breathing, diminished bilaterally in the bases, distant lung sounds, no wheeze or crackles appreciated.   Cardiovascular: regular rate and rhythm, and no murmur noted  GI: No scars, normal bowel sounds, soft, non-distended, non-tender, no masses palpated, no hepatosplenomegally       Primary Care Physician   Kd Leong    Discharge Orders      Reason for your hospital stay    You were seen for shortness of breath which is likely related to bronchitis. We managed your symptoms with nebulizer treatments as well as steroids. You should continue the steroids for 3 more days.    Your blood sugars have been elevated, which can happen when you are on steroids. Please eat a low carbohydrate diet at home and avoid sugars. Your hemoglobin A1c is also elevated suggestive of diabetes, you may need to start medications for this. Follow up with your primary clinic physician to discuss this further.     Follow-up and recommended labs and tests     Follow up with primary care provider, Kd Leong, within 7 days for hospital follow- up.  No follow up labs or test are needed.     Activity    Your activity upon discharge: activity as tolerated     Nebulizer and Supplies Order    Nebulizer/Supplies Documentation:   The patient was seen 01/23/2020. After assessment, the patient will need to be treated with ongoing nebulizer for  treatment/management of acute on chronic bronchitis with history of COPD/asthma.    I, the undersigned, certify that the above prescribed supplies are medically necessary for this patient and is both reasonable and necessary in reference to accepted standards of medical and necessary in reference to accepted standards of medical practice in the treatment of this patient's condition and is not prescribed as a convenience.     Diet    Follow this diet upon discharge: Diabetic Diet       Significant Results and Procedures   Most Recent 3 CBC's:  Recent Labs   Lab Test 01/23/20 0444 01/22/20 2056 01/14/20  1348   WBC 5.8 5.3 6.3   HGB 13.5 13.0 14.1   MCV 87 86 88   * 125* 163     Most Recent 3 BMP's:  Recent Labs   Lab Test 01/23/20 0444 01/22/20 2056 12/11/19  0518    143 140   POTASSIUM 3.7 3.2* 4.2   CHLORIDE 104 104 104   CO2 31 33* 36*   BUN 8 8 10   CR 0.66 0.82 0.75   ANIONGAP 6 6 <1*   HOOD 8.5 8.7 8.9   * 152* 182*     Most Recent 6 glucoses:  Recent Labs   Lab Test 01/23/20 0444 01/22/20 2056 12/11/19  0518 12/10/19  1205 11/10/19  0509 11/08/19  1617   * 152* 182* 172* 185* 158*       Discharge Medications   Current Discharge Medication List      START taking these medications    Details   predniSONE (DELTASONE) 5 MG tablet Take 4 tablets (20 mg) by mouth daily for 4 days, THEN 2 tablets (10 mg) daily for 3 days, THEN 1 tablet (5 mg) daily for 3 days.  Qty: 25 tablet, Refills: 0    Associated Diagnoses: Acute bronchitis, unspecified organism         CONTINUE these medications which have NOT CHANGED    Details   acetaminophen (TYLENOL) 500 MG tablet Take 500-1,000 mg by mouth every 6 hours as needed for mild pain      albuterol (PROAIR HFA/PROVENTIL HFA/VENTOLIN HFA) 108 (90 Base) MCG/ACT inhaler Inhale 2 puffs into the lungs every 6 hours as needed for shortness of breath / dyspnea or wheezing  Qty: 1 Inhaler, Refills: 11    Comments: Pharmacy may dispense brand covered by  insurance (Proair, or proventil or ventolin or generic albuterol inhaler)  Associated Diagnoses: Mild persistent asthma without complication; Chronic obstructive pulmonary disease, unspecified COPD type (H)      albuterol (PROVENTIL) (2.5 MG/3ML) 0.083% neb solution Take 1 vial (2.5 mg) by nebulization every 6 hours as needed for shortness of breath / dyspnea or wheezing  Qty: 1 Box, Refills: 0    Comments: The patient requests that this prescription be held on file for filling in the near future.  Associated Diagnoses: Mild persistent asthma without complication; Chronic obstructive pulmonary disease, unspecified COPD type (H)      allopurinol (ZYLOPRIM) 300 MG tablet Take 1 tablet (300 mg) by mouth daily  Qty: 90 tablet, Refills: 3    Associated Diagnoses: Acute gouty arthritis      ARIPiprazole (ABILIFY) 10 MG tablet TAKE ONE TABLET BY MOUTH EVERY DAY  Qty: 90 tablet, Refills: 1    Associated Diagnoses: Major depressive disorder, recurrent episode, moderate (H)      buPROPion (WELLBUTRIN XL) 150 MG 24 hr tablet TAKE ONE TABLET BY MOUTH EVERY EVENING  Qty: 150 tablet, Refills: 0    Associated Diagnoses: Moderate mixed bipolar I disorder (H)      fluticasone-salmeterol (ADVAIR HFA) 230-21 MCG/ACT inhaler Inhale 2 puffs into the lungs 2 times daily  Qty: 1 Inhaler, Refills: 3    Associated Diagnoses: Chronic obstructive pulmonary disease, unspecified COPD type (H)      furosemide (LASIX) 40 MG tablet TAKE ONE TABLET BY MOUTH TWICE A DAY  Qty: 660 tablet, Refills: 0    Associated Diagnoses: Lymphedema of both lower extremities      gabapentin (NEURONTIN) 300 MG capsule TAKE ONE CAPSULE BY MOUTH ONCE DAILY AT BEDTIME WITH A 600 MG TABLET FOR A TOTAL DOSE  MG  Qty: 90 capsule, Refills: 0    Associated Diagnoses: Polyneuropathy in other diseases classified elsewhere (H)      gabapentin (NEURONTIN) 600 MG tablet TAKE ONE TABLET BY MOUTH THREE TIMES A DAY  Qty: 90 tablet, Refills: 3    Associated Diagnoses:  Polyneuropathy in other diseases classified elsewhere (H)      lamoTRIgine (LAMICTAL) 100 MG tablet Take 1 tablet (100 mg) by mouth daily  Qty: 90 tablet, Refills: 3    Associated Diagnoses: Major depressive disorder, recurrent episode, moderate (H)      levothyroxine (SYNTHROID/LEVOTHROID) 175 MCG tablet Take 1 tablet (175 mcg) by mouth daily  Qty: 90 tablet, Refills: 0    Associated Diagnoses: Hypothyroidism, unspecified type      mometasone-formoterol (DULERA) 200-5 MCG/ACT oral inhaler Inhale 2 puffs into the lungs 2 times daily  Qty: 13 g, Refills: 1    Associated Diagnoses: COPD exacerbation (H); Chronic obstructive pulmonary disease, unspecified COPD type (H)      omeprazole (PRILOSEC) 20 MG DR capsule TAKE ONE CAPSULE BY MOUTH TWICE A DAY  Qty: 180 capsule, Refills: 0    Associated Diagnoses: Gastroesophageal reflux disease without esophagitis      potassium chloride ER (K-DUR/KLOR-CON M) 10 MEQ CR tablet TAKE ONE TABLET BY MOUTH ONCE DAILY  Qty: 90 tablet, Refills: 0    Associated Diagnoses: Lymphedema      pramipexole (MIRAPEX) 0.125 MG tablet TAKE 1-2 TABLETS BY MOUTH AT BEDTIME  Qty: 60 tablet, Refills: 10    Associated Diagnoses: Restless leg syndrome      simvastatin (ZOCOR) 20 MG tablet TAKE ONE TABLET BY MOUTH EVERY NIGHT AT BEDTIME  Qty: 90 tablet, Refills: 3    Associated Diagnoses: Hypercholesteremia      traZODone (DESYREL) 150 MG tablet TAKE ONE TABLET BY MOUTH EVERY NIGHT AT BEDTIME  Qty: 90 tablet, Refills: 1    Associated Diagnoses: Major depressive disorder, recurrent episode, moderate (H)      VITAMIN D3 1000 units tablet TAKE ONE TABLET BY MOUTH ONCE DAILY  Qty: 100 tablet, Refills: 2    Associated Diagnoses: Lymphedema of both lower extremities      warfarin ANTICOAGULANT (JANTOVEN ANTICOAGULANT) 5 MG tablet 12.5 mg (5 mg x 2.5) every day OR AS DIRECTED BY [facility]  Qty: 240 tablet, Refills: 0    Associated Diagnoses: DVT (deep venous thrombosis) (H)      EPINEPHrine (EPIPEN/ADRENACLICK/OR ANY  BX GENERIC EQUIV) 0.3 MG/0.3ML injection 2-pack Inject 0.3 mLs (0.3 mg) into the muscle once as needed for anaphylaxis  Qty: 0.6 mL, Refills: 0    Associated Diagnoses: Anaphylactic reaction to bee sting, undetermined intent, subsequent encounter      ipratropium - albuterol 0.5 mg/2.5 mg/3 mL (DUONEB) 0.5-2.5 (3) MG/3ML neb solution Take 1 vial (3 mLs) by nebulization every 6 hours as needed for shortness of breath / dyspnea or wheezing  Qty: 30 vial, Refills: 11    Associated Diagnoses: Chronic obstructive pulmonary disease, unspecified COPD type (H)      !! order for DME Equipment being ordered:x2 [employer] 30/40mmHg compression wraps with x2 extra prs of compression liners  Qty: 1 each, Refills: 0    Associated Diagnoses: Secondary lymphedema      !! order for DME Equipment being ordered: Nebulizer  Qty: 1 Device, Refills: 0    Associated Diagnoses: COPD exacerbation (H); Chronic obstructive pulmonary disease, unspecified COPD type (H)      !! order for DME Equipment being ordered: CPAP  AIRSENSE 10  5-18 CM H20  SN# [id number]   DN# [id number]      !! order for DME Equipment being ordered: DEPENDS SIZE LARGE  Qty: 60 Month, Refills: 5    Associated Diagnoses: Mixed incontinence      !! order for DME Equipment being ordered: INCONTINENCE PADS   QID  Qty: 1 Month, Refills: 11    Associated Diagnoses: Other urinary incontinence       !! - Potential duplicate medications found. Please discuss with provider.        Allergies   Allergies   Allergen Reactions     Darvocet [Propoxyphene N-Apap] Anaphylaxis     Darvocet,Percocet      Percocet [Oxycodone-Acetaminophen] Anaphylaxis, Hives and Swelling     Has tolerated hydromorphone in the past.      Asa [Aspirin] Hives     Aspirin causes seizures and hives, throat swelling.   Has tolerated ketorolac in the past.        Bee      Ibuprofen Swelling     Throat swelling per patient      Lyrica [Pregabalin] Other (See Comments) and Swelling     dizziness     Povidone Iodine Rash      Reaction to topical betadine     Tape [Adhesive Tape] Rash

## 2020-01-23 NOTE — ED PROVIDER NOTES
History     Chief Complaint   Patient presents with     Shortness of Breath     Cellulitis     HPI  Bia Bill is a 54 year old female who smokes, presents with shortness of air and cough for the past 24 hours.  No fever.  Describes dyspnea on exertion and difficulty doing anything secondary to shortness of air.  Denies hemoptysis, cough is minimally productive.  Did not get influenza vaccination.  Denies significant chest pain.  Describes chronic lower extremity edema with redness is a little worse bilaterally than usual.    Allergies:  Allergies   Allergen Reactions     Darvocet [Propoxyphene N-Apap] Anaphylaxis     Darvocet,Percocet      Percocet [Oxycodone-Acetaminophen] Anaphylaxis, Hives and Swelling     Has tolerated hydromorphone in the past.      Asa [Aspirin] Hives     Aspirin causes seizures and hives, throat swelling.   Has tolerated ketorolac in the past.        Bee      Ibuprofen Swelling     Throat swelling per patient      Lyrica [Pregabalin] Other (See Comments) and Swelling     dizziness     Povidone Iodine Rash     Reaction to topical betadine     Tape [Adhesive Tape] Rash       Problem List:    Patient Active Problem List    Diagnosis Date Noted     Dyspnea 01/23/2020     Priority: Medium     Rash 12/10/2019     Priority: Medium     Type 2 diabetes mellitus (H) 12/10/2019     Priority: Medium     A1c Oct 2019 = 6.1       Bilateral edema of lower extremity 12/10/2019     Priority: Medium     Skin infection 12/10/2019     Priority: Medium     Bronchitis 11/09/2019     Priority: Medium     COPD exacerbation (H) 10/29/2019     Priority: Medium     Personal history of DVT (deep vein thrombosis) 10/29/2019     Priority: Medium     She had a DVT post pregnancy and delivery in 1992.   3/26/2004: Hospitalized at [facility] for extensive left lower leg DVT.  This occurred a month after pneumonia episode and decreased activity.  She had lytic therapy, tPA followed by Possis mechanical thrombectomy  device and three stents in veins.  She did have Groshong catheter at the time.  There was a positive lupus anticoagulant, negative Factor V and cardiolipin clement.        Acute respiratory failure with hypoxia and hypercapnia (H) 10/29/2019     Priority: Medium     Lymphedema of both lower extremities 10/29/2019     Priority: Medium     Cellulitis 06/06/2019     Priority: Medium     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 10/03/2018     Priority: Medium     Conductive hearing loss of left ear with restricted hearing of right ear 04/17/2018     Priority: Medium     Sensorineural hearing loss (SNHL) of right ear with restricted hearing of left ear 04/17/2018     Priority: Medium     Morbid obesity with alveolar hypoventilation (H) 02/01/2017     Priority: Medium     Cyst of left ovary 01/11/2016     Priority: Medium     PVD (peripheral vascular disease) with claudication (H) 10/30/2015     Priority: Medium     LEFT LE. STENT x 3 LEFT UPPER LEG       Morbid obesity (H) 06/17/2015     Priority: Medium     Vitamin D deficiency 11/05/2014     Priority: Medium     Problem list name updated by automated process. Provider to review       Sebaceous cyst of right axilla 10/14/2014     Priority: Medium     HTN, goal below 140/90 10/14/2014     Priority: Medium     Left leg pain 10/14/2014     Priority: Medium     History of stroke symptoms, stroke ruled out on imaging 07/10/2014     Priority: Medium     Admissions in 2011 and 2014 for stroke-like symptoms. Evaluated by neurology on both occasions, imaging not consistent with stroke. Thought to be due to conversion disorder in 2011.       Somatization disorder 07/10/2014     Priority: Medium     Benign neoplasm of colon (POLYPOSIS) 06/04/2014     Priority: Medium     Specimen #: [id number]   Collected: 11/7/2017   Received: 11/7/2017   Reported: 11/9/2017 19:59   Ordering Phy(s): CARMEL GALLOWAY     For improved result formatting, select  'View Enhanced Report Format'   under Linked Documents section.     SPECIMEN(S):   Colon polyp, 30 cm     FINAL DIAGNOSIS:   Colon at 30 cm, mucosal biopsy:   - Hyperplastic polyp, with focal changes suggestive of sessile serrated   adenoma.     Electronically signed out by:     Eric Rivas M.D.  = per Surveillance Recommendations:  Repeat in 5 yrs.    Notes Recorded by Moris Thomas MD on 4/25/2014 at 1:36 PM  Adenomatous polyp colonoscopy 5 years         DDD (degenerative disc disease), cervical 12/17/2013     Priority: Medium     Health Care Home 09/03/2013     Priority: Medium     Status:  Accepted  Care Coordinator:  Alla Fernandez    See Letters for [facility] Care Plan  Date:  October 20, 2014         Rosacea 08/26/2013     Priority: Medium     COPD (chronic obstructive pulmonary disease) (H) 08/23/2012     Priority: Medium     Pulmonary Function test by Sylvester Erickson MD on 9/13/2013 3:38 PM   IDENTIFICATION: Bia Bill is a 47-year-old female with obesity and a history of tobacco use.   RESULTS:   1. Spirometry: FEV1 moderately reduced. FVC moderately reduced. FEV1/FVC ratio normal.   2. Bronchodilator Response: No significant change is seen with bronchodilators.   INTERPRETATION: Moderate reduction in FEV1 and FVC with well-preserved FEV1/FVC ratio. This can be seen in the setting of obstructive lung disease with air trapping or with restrictive lung disease. Clinical correlation is needed. Further testing with lung volumes and DLCO may be useful.   SYLVESTER ERICKSON MD          Moderate mixed bipolar I disorder (H) 10/13/2011     Priority: Medium     Problem list name updated by automated process. Provider to review       Personality disorder, depressive 10/13/2011     Priority: Medium     Erythrocytosis      Priority: Medium     Smoker 04/01/2011     Priority: Medium     3/2011  Not interested in quitting at this time.   Will consider and let us know how we can be of assistance.   Understands long term  risks associated with same and increased risk of blood clot formation.        GILES (Obstructive Sleep Apnea)-Moderate (AHI 16) 03/01/2010     Priority: Medium     RLS (restless legs syndrome) 03/01/2010     Priority: Medium     Ferritin 11.       Fatty liver 08/28/2009     Priority: Medium     US on 8/26/2009        Severe episode of recurrent major depressive disorder (H) 11/03/2008     Priority: Medium     Hypothyroidism 05/13/2008     Priority: Medium     was hyperthyroid - Hashimoto's and graves and had iodine treatment done at [facility] in early 2008.    Now hypothyroid - is following at [facility], on 125mcg levothyroxine - see scanned documents       Esophageal reflux 04/29/2008     Priority: Medium     Developmental reading disorder 09/12/2007     Priority: Medium     Problem list name updated by automated process. Provider to review       Chondromalacia of patella 02/27/2007     Priority: Medium     SECONDARY POLYCYTHEMIA      Priority: Medium     Betheda HGB, a high-oxygen affinity hemoglobin which results in a normal compensatory erythrocytosis.  There is no specific therapy needed.  I did order a hemoglobin electrophoresis today to help confirm this in Bia, as she tells me she has never had this test performed. Dr. Riley 1/17/07       Alcohol abuse, in remission 08/01/2006     Priority: Medium     Quit [address] court ordered, lost her children,        Mild persistent asthma 07/11/2006     Priority: Medium     Polyneuropathy in other diseases classified elsewhere (H) 07/11/2006     Priority: Medium     Has had chronic pain in the left groin and upper leg secondary to a blood clot in the thigh, treated with neurontin without benefit, still has chronic pain since 2005, sharp shooting intermittantly.    MRI/MRA 12/07 - [facility] - see scanned documents   - had mild nonspecific white matter changes  Otherwise negative     MRI c- spine [facility] 12/07 - no cervical cord abn.  Small disc protrusion C6-C7, no  impingement       DVT (deep venous thrombosis) (H) 07/11/2006     Priority: Medium     She had a DVT post pregnancy and delivery in 1992.   3/26/2004:Hospitalized at [facility] for extensive left lower leg DVT.  This occurred a month after pneumonia episode and decreased activity.  She had lytic therapy, tPA followed by Possis mechanical thrombectomy device and three stents in veins.  She did have Groshong catheter at the time.  There was a positive lupus anticoagulant, negative Factor V and cardiolipin clement.           Past Medical History:    Past Medical History:   Diagnosis Date     Acromioclavicular joint arthritis 1/25/2017     Acute bronchospasm 8/15/2016     Acute gouty arthritis 8/4/2014     Anxiety state, unspecified      Bipolar I disorder, most recent episode (or current) unspecified      Closed fracture of metatarsal bone 6/5/2007     Depressive disorder, not elsewhere classified      DVT, lower extremity (H)      Headache 10/17/2014     Impingement syndrome of shoulder region 1/25/2017     Polycythemia, secondary      Right shoulder pain 10/14/2014     Trochanteric bursitis of both hips 1/11/2016       Past Surgical History:    Past Surgical History:   Procedure Laterality Date     BONE MARROW BIOPSY, BONE SPECIMEN, NEEDLE/TROCAR N/A 11/17/2014    Procedure: BIOPSY BONE MARROW;  Surgeon: Hay Aaron MD;  Location: [facility]     D & C  10/26/09    with uterine ablation     ENDOVASCULAR PLACEMENT VASCULAR DEVICE Left     Left leg stent x 3     ESOPHAGOSCOPY, GASTROSCOPY, DUODENOSCOPY (EGD), COMBINED  4/21/2014    Procedure: Gastroscopy;  Surgeon: Moris Thomas MD;  Location: [facility]     HYSTERECTOMY, PAP NO LONGER INDICATED  1-4-2010     LAPAROSCOPIC CHOLECYSTECTOMY N/A 1/4/2019    Procedure: Laparoscopic cholecystectomy;  Surgeon: Aaron Siegel MD;  Location: [facility]     LITHOTRIPSY  2004    Lithotrypsy       Family History:    Family History   Problem Relation Age of Onset     Hypertension  "Mother      Diabetes Mother      Blood Disease Mother      Heart Disease Mother         chf     Cerebrovascular Disease Mother      Hypertension Father      Cancer Father         lung cancer     Allergies Sister      Diabetes Sister      Depression Sister      Hypertension Sister        Social History:  Marital Status:   [4]  Social History     Tobacco Use     Smoking status: Former Smoker     Packs/day: 0.50     Years: 34.00     Pack years: 17.00     Types: Cigarettes     Last attempt to quit: 2019     Years since quittin.1     Smokeless tobacco: Never Used     Tobacco comment: started at age 11.  Quit during pregnancyX4.  Quit 3 months ago.    Substance Use Topics     Alcohol use: No     Alcohol/week: 0.0 standard drinks     Comment: quit      Drug use: No     Comment: past hx 22 years ago/ uppers downers, canibus        Medications:    No current outpatient medications on file.        Review of Systems  All other systems reviewed and are negative.    Physical Exam   BP: (!) 157/83  Pulse: 86  Temp: 97.9  F (36.6  C)  Resp: 22  Height: 147.3 cm (4' 10\")  Weight: 110.2 kg (243 lb)  SpO2: 96 %      Physical Exam  Nontoxic appearing moderate respiratory distress in the form of tachypnea alert and oriented ×3  Head atraumatic normocephalic  Conjunctiva noninjected, oropharynx moist without lesions or erythema  No cervical adenopathy   Neck supple full active painless range of motion  Lungs poor air movement scant distant expiratory wheeze  Heart regular no murmur  Abdomen soft nontender bowel sounds positive no masses or HSM  Strength and sensation grossly intact throughout the extremities  Speech is fluent, good eye contact, thought processes are rational  Lower extremities with lymphedema, some minimal stasis changes, weeping right shin, bilateral erythema that is very faint and mild, bilateral warmth and reported tenderness      ED Course        Procedures  ECG: Normal rate and rhythm, axis " and intervals within normal limits, no acute ST or T wave changes. Decreased amplitude.  Read by Dr. Roger Condon               Critical Care time:  none               Results for orders placed or performed during the hospital encounter of 01/22/20 (from the past 24 hour(s))   CBC with platelets differential   Result Value Ref Range    WBC 5.3 4.0 - 11.0 10e9/L    RBC Count 5.22 (H) 3.8 - 5.2 10e12/L    Hemoglobin 13.0 11.7 - 15.7 g/dL    Hematocrit 44.7 35.0 - 47.0 %    MCV 86 78 - 100 fl    MCH 24.9 (L) 26.5 - 33.0 pg    MCHC 29.1 (L) 31.5 - 36.5 g/dL    RDW 21.3 (H) 10.0 - 15.0 %    Platelet Count 125 (L) 150 - 450 10e9/L    Diff Method Automated Method     % Neutrophils 65.1 %    % Lymphocytes 25.9 %    % Monocytes 6.9 %    % Eosinophils 0.9 %    % Basophils 0.6 %    % Immature Granulocytes 0.6 %    Nucleated RBCs 0 0 /100    Absolute Neutrophil 3.5 1.6 - 8.3 10e9/L    Absolute Lymphocytes 1.4 0.8 - 5.3 10e9/L    Absolute Monocytes 0.4 0.0 - 1.3 10e9/L    Absolute Eosinophils 0.1 0.0 - 0.7 10e9/L    Absolute Basophils 0.0 0.0 - 0.2 10e9/L    Abs Immature Granulocytes 0.0 0 - 0.4 10e9/L    Absolute Nucleated RBC 0.0    Comprehensive metabolic panel   Result Value Ref Range    Sodium 143 133 - 144 mmol/L    Potassium 3.2 (L) 3.4 - 5.3 mmol/L    Chloride 104 94 - 109 mmol/L    Carbon Dioxide 33 (H) 20 - 32 mmol/L    Anion Gap 6 3 - 14 mmol/L    Glucose 152 (H) 70 - 99 mg/dL    Urea Nitrogen 8 7 - 30 mg/dL    Creatinine 0.82 0.52 - 1.04 mg/dL    GFR Estimate 81 >60 mL/min/[1.73_m2]    GFR Estimate If Black >90 >60 mL/min/[1.73_m2]    Calcium 8.7 8.5 - 10.1 mg/dL    Bilirubin Total 0.4 0.2 - 1.3 mg/dL    Albumin 3.0 (L) 3.4 - 5.0 g/dL    Protein Total 6.1 (L) 6.8 - 8.8 g/dL    Alkaline Phosphatase 133 40 - 150 U/L    ALT 16 0 - 50 U/L    AST 8 0 - 45 U/L   Nt probnp inpatient (BNP)   Result Value Ref Range    N-Terminal Pro BNP Inpatient 168 0 - 900 pg/mL   INR   Result Value Ref Range    INR 4.21 (H) 0.86 - 1.14    Blood gas venous   Result Value Ref Range    Ph Venous 7.42 7.32 - 7.43 pH    PCO2 Venous 54 (H) 40 - 50 mm Hg    PO2 Venous 23 (L) 25 - 47 mm Hg    Bicarbonate Venous 35 (H) 21 - 28 mmol/L    Base Excess Venous 8.6 mmol/L    FIO2 ra    Influenza A/B antigen   Result Value Ref Range    Influenza A/B Agn Specimen Nasopharyngeal     Influenza A Negative NEG^Negative    Influenza B Negative NEG^Negative   XR Chest 2 Views    Narrative    CHEST TWO VIEWS 1/22/2020 9:07 PM     HISTORY: Cough    COMPARISON: 1/8/2019    FINDINGS: The heart is enlarged. There is vascular engorgement and  interstitial thickening. No airspace consolidation, pneumothorax, or  pleural effusion.       Impression    IMPRESSION: Findings concerning for congestive heart failure.    SOMMER SUNG MD   Glucose by meter   Result Value Ref Range    Glucose 158 (H) 70 - 99 mg/dL     *Note: Due to a large number of results and/or encounters for the requested time period, some results have not been displayed. A complete set of results can be found in Results Review.       Medications   doxycycline hyclate (VIBRAMYCIN) capsule 100 mg (100 mg Oral Given 1/22/20 2304)   allopurinol (ZYLOPRIM) tablet 300 mg (has no administration in time range)   ARIPiprazole (ABILIFY) tablet 10 mg (10 mg Oral Given 1/23/20 0222)   buPROPion (WELLBUTRIN XL) 24 hr tablet 150 mg (has no administration in time range)   fluticasone-vilanterol (BREO ELLIPTA) 200-25 MCG/INH inhaler 1 puff (has no administration in time range)   furosemide (LASIX) tablet 40 mg (has no administration in time range)   gabapentin (NEURONTIN) tablet 600 mg (has no administration in time range)   gabapentin (NEURONTIN) capsule 300 mg (has no administration in time range)   lamoTRIgine (LaMICtal) tablet 100 mg (100 mg Oral Given 1/23/20 0222)   levothyroxine (SYNTHROID/LEVOTHROID) tablet 175 mcg (has no administration in time range)   omeprazole (priLOSEC) CR capsule 20 mg (20 mg Oral Given 1/23/20  0222)   potassium chloride ER (K-TAB/KLOR-CON) CR tablet 10 mEq (has no administration in time range)   simvastatin (ZOCOR) tablet 20 mg (20 mg Oral Given 1/23/20 0222)   traZODone (DESYREL) tablet 150 mg (150 mg Oral Given 1/23/20 0222)   acetaminophen (TYLENOL) tablet 650 mg (650 mg Oral Given 1/23/20 0225)   acetaminophen (TYLENOL) Suppository 650 mg (has no administration in time range)   naloxone (NARCAN) injection 0.1-0.4 mg (has no administration in time range)   melatonin tablet 1 mg (has no administration in time range)   ondansetron (ZOFRAN-ODT) ODT tab 4 mg (has no administration in time range)     Or   ondansetron (ZOFRAN) injection 4 mg (has no administration in time range)   senna-docusate (SENOKOT-S/PERICOLACE) 8.6-50 MG per tablet 1 tablet (has no administration in time range)     Or   senna-docusate (SENOKOT-S/PERICOLACE) 8.6-50 MG per tablet 2 tablet (has no administration in time range)   polyethylene glycol (MIRALAX/GLYCOLAX) Packet 17 g (has no administration in time range)   prochlorperazine (COMPAZINE) injection 10 mg (has no administration in time range)     Or   prochlorperazine (COMPAZINE) tablet 10 mg (has no administration in time range)     Or   prochlorperazine (COMPAZINE) Suppository 25 mg (has no administration in time range)   ipratropium - albuterol 0.5 mg/2.5 mg/3 mL (DUONEB) neb solution 3 mL (has no administration in time range)   albuterol (PROVENTIL) neb solution 2.5 mg (has no administration in time range)   predniSONE (DELTASONE) tablet 40 mg (has no administration in time range)   pramipexole (MIRAPEX) tablet 0.125-0.25 mg (0.125 mg Oral Given 1/23/20 0222)   Warfarin Therapy Reminder (Check START DATE - warfarin may be starting in the FUTURE) (has no administration in time range)   ipratropium - albuterol 0.5 mg/2.5 mg/3 mL (DUONEB) neb solution 3 mL (3 mLs Nebulization Given 1/22/20 2054)   ipratropium - albuterol 0.5 mg/2.5 mg/3 mL (DUONEB) neb solution 3 mL (3 mLs  Nebulization Given 1/22/20 5948)   predniSONE (DELTASONE) tablet 40 mg (40 mg Oral Given 1/22/20 7555)       Assessments & Plan (with Medical Decision Making)  Wheezing, short of air, poor air movement, responded to DuoNeb.  Chest x-ray negative for acute finding.  Flu swab negative.  Findings consistent with asthma exacerbation/COPD.  Prednisone, doxycycline.  Walked no significant desaturation but significant tachypnea and work of breathing prompts recommendation and request by patient for admission.  Reviewed with Dr. Lin who accepts.     I have reviewed the nursing notes.    I have reviewed the findings, diagnosis, plan and need for follow up with the patient.          Current Discharge Medication List          Final diagnoses:   COPD exacerbation (H)       1/22/2020   [facility] INTENSIVE [facility]     Roger Condon MD  01/23/20 0256

## 2020-01-23 NOTE — ED NOTES
Productive cough, SOB, midsternal CP, HA since last night. Mild expiratory wheeze  No fevers, n/v/d  Cellulitis intermittent in both legs for 4 months. Stopped antibiotics a month ago, no change since treatment. Uses compression socks

## 2020-01-23 NOTE — PROGRESS NOTES
"[facility] ADMISSION NOTE    Patient admitted to room 1004 at approximately 0015 via wheel chair from emergency room. Patient was accompanied by nurse.     Verbal SBAR report received from Janice Baez RN prior to patient arrival.     Patient ambulated to bed with stand-by assist. Patient alert and oriented X 3. Pain is controlled without any medications. 0-10 Pain Scale: 5. Admission vital signs: Blood pressure 122/76, pulse 84, temperature 97.9  F (36.6  C), temperature source Oral, resp. rate 22, height 1.473 m (4' 10\"), weight 109.9 kg (242 lb 4.6 oz), last menstrual period 09/27/2009, SpO2 98 %, not currently breastfeeding. Patient was oriented to plan of care, call light, bed controls, tv, telephone, bathroom and visiting hours.     Risk Assessment    The following safety risks were identified during admission: none. Yellow risk band applied: NO.     Skin Initial Assessment    This writer admitted this patient and completed a full skin assessment and Brandon score in the Adult PCS flowsheet. Appropriate interventions initiated as needed.     Secondary skin check completed by Carina Melara RN.    Brandon Risk Assessment  Sensory Perception: 4-->no impairment  Moisture: 3-->occasionally moist  Activity: 3-->walks occasionally  Mobility: 3-->slightly limited  Nutrition: 3-->adequate  Friction and Shear: 3-->no apparent problem  Brandon Score: 19  Bed Support Surface: Atmos Air mattress  Reassessed using Bed Algorithm: No    Education    Patient has a [place] to Observation order: Yes  Observation education completed and documented: Yes      Maira Landrum RN    "

## 2020-01-23 NOTE — PROGRESS NOTES
"Skin: Pt has many scabs on her chest, shoulders, and abdomin, some on her legs.  She states \"this is d/t the fact that she has a problem with spiders and them biting her.\"  There are none that are open but all at different stages of healing.  Pt's legs are red, warm, and swollen.  There don't appear to be any open areas or weeping.  Will continue to monitor close for changes.  "

## 2020-01-23 NOTE — CONSULTS
Care Transition Initial Assessment - RN      Met with: Patient.    DATA  Principal Problem:    Dyspnea  Active Problems:    Mild persistent asthma    Polyneuropathy in other diseases classified elsewhere (H)    DVT (deep venous thrombosis) (H)    Esophageal reflux    Hypothyroidism    GILES (Obstructive Sleep Apnea)-Moderate (AHI 16)    RLS (restless legs syndrome)    Moderate mixed bipolar I disorder (H)    HTN, goal below 140/90    Morbid obesity (H)    Severe episode of recurrent major depressive disorder (H)    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    COPD exacerbation (H)    Lymphedema of both lower extremities    Type 2 diabetes mellitus (H)       Cognitive Status: awake and alert.  [facility]  Primary Care MD Name: Dr. Leong  Contact information and PCP information verified: Yes  Lives With: alone, friend(s)      Insurance concerns: No Insurance issues identified    This writer met with pt, introduced self and role. Patient lives in mobile home with a friend.  Uses [employer] for transportation. Patient has no concerns for discharge home.  Declines need for services at this time.    PLAN    Discharge home.  CTS will follow through discharge should additional needs arise.    Renuka Long, DNP, RN, CNE, PHN  RN Care Coordinator  [facility] - [phone]  [facility] [phone]

## 2020-01-23 NOTE — H&P
[facility]    History and Physical - Hospitalist Service       Date of Admission:  1/22/2020    Assessment & Plan   Bia Bill is a 54 year old female admitted on 1/22/2020. She has history of COPD/restrictive lung disease, lymphedema, PVD, bipolar/depression, erythrocytosis, gout, DVT, GERD, RLS, obesity and GILES. She presents with shortness of breath.    Acute Dyspnea  2 days of shortness of breath, chills and dry cough. Not requiring oxygen. Lung sounds distance and diminished on exam, faint wheeze with forced expiration. Labs unremarkable. Rapid influenza negative. CXR shows interstitial prominence that does not look too different from previous chest x-rays, BNP is low. Symptomatically, sounds consistent with acute bronchitis. CHF also considered, lower suspicion though volume status difficult to assess, weight overall appears stable. Patient feels better with nebs and steroids, plan to continues this treatment and observe, if improving will likely discharge later today  - scheduled duonebulizers  - start prednisone  - walk with nursing staff    COPD / Asthma / Restrictive Lung disease  Tobacco use  PFTs in 2018 consistent with restrictive process however there was borderline improvement with bronchodilators suggesting that obstruction may also be present.  - continue home nebulizer treatments prn  - hold home Dulera while observation      Hypoventilation, GILSE  Morbid Obesity  Chronic. Managed with home CPAP.  - continue CPAP if someone is able to bring in     Lymphedema  No prior history of CHF. Last echo 2018 showed mild LVH, normal EF, normal RV and LV size/function.  - continue home furosemide    Peripheral Vascular Disease  Noted in history of stenting in the left leg.  - continue home warfarin, statin       Bipolar Disorder / Depression  Follows with psychology, [facility] center.   - continue home Lamictal, Abilify, bupropion, trazodone      Erythrocytosis  Hgb  13.0, hematocrit 44.7. Follows with hematology, Dr. Watts, history of phlebotomy in the past (2011) to keep Hgb < 20, possibly Hb [place] based on previous work up.  - continue outpatient follow up     Gout  Chronic.  - continue home allopurinol     Gastroesophageal reflux disease  Chronic.  - continue home omeprazole     History of DVT  DVT in 1992 post-partum, recurrence in 2004 due to decreased mobility. . Managed on warfarin. INR supratherapeutic on admit.  - continue home warfarin, pharmacy to dose     Hypothyroidism  Chronic.   - continue home levothyroxine     Restless Legs  Polyneuropathy  Chronic.  - continue home pramipexole, gabapentin     Diet: Low Saturated Fat Na <2400 mg    DVT Prophylaxis: Warfarin  Goldsmith Catheter: not present  Code Status: Full Code      Disposition Plan   Expected discharge: today or tomorrow, recommended to prior living arrangement once respiratory status improves.  Entered: Ayanna Proctor PA-C 01/23/2020, 7:15 AM     The patient's care was discussed with the Attending Physician, Dr. Aaron Luz and Patient.    Ayanna Proctor PA-C  [facility]  ______________________________________________________________________    Chief Complaint   Shortness of breath    History is obtained from the patient    History of Present Illness   Bia Bill is a 54 year old female who presents with shortness of breath.    2 nights ago she was laying down sleeping when she woke up feeling short of breath. Shortness of breath is worse with activity. She does not have orthopnea, feels symptoms regardless of position without change. She then developed a dry cough later that day. She feels as though there is congestion in her chest that she cannot get out. She reports chills without known fever. No sick contacts. She denies chest pains or palpitations.    Patient denies myalgias, lightheadedness, dizziness, abdominal pain, nausea, vomiting, diarrhea, changes in  urination, rash or wounds.    Review of Systems    The 10 point Review of Systems is negative other than noted in the HPI or here.     Past Medical History    I have reviewed this patient's medical history and updated it with pertinent information if needed.   Past Medical History:   Diagnosis Date     Acromioclavicular joint arthritis 1/25/2017     Acute bronchospasm 8/15/2016     Acute gouty arthritis 8/4/2014     Anxiety state, unspecified      Bipolar I disorder, most recent episode (or current) unspecified      Closed fracture of metatarsal bone 6/5/2007     Depressive disorder, not elsewhere classified      DVT, lower extremity (H)      Headache 10/17/2014     Impingement syndrome of shoulder region 1/25/2017     Polycythemia, secondary     [place] HGB      Right shoulder pain 10/14/2014     Trochanteric bursitis of both hips 1/11/2016     Past Surgical History   I have reviewed this patient's surgical history and updated it with pertinent information if needed.  Past Surgical History:   Procedure Laterality Date     BONE MARROW BIOPSY, BONE SPECIMEN, NEEDLE/TROCAR N/A 11/17/2014    Procedure: BIOPSY BONE MARROW;  Surgeon: Hay Aaron MD;  Location: [facility]     D & C  10/26/09    with uterine ablation     ENDOVASCULAR PLACEMENT VASCULAR DEVICE Left     Left leg stent x 3     ESOPHAGOSCOPY, GASTROSCOPY, DUODENOSCOPY (EGD), COMBINED  4/21/2014    Procedure: Gastroscopy;  Surgeon: Moris Thomas MD;  Location: [facility]     HYSTERECTOMY, PAP NO LONGER INDICATED  1-4-2010     LAPAROSCOPIC CHOLECYSTECTOMY N/A 1/4/2019    Procedure: Laparoscopic cholecystectomy;  Surgeon: Aaron Siegel MD;  Location: [facility]     LITHOTRIPSY  2004    Lithotrypsy     Social History   I have reviewed this patient's social history and updated it with pertinent information if needed.  Social History     Tobacco Use     Smoking status: Former Smoker     Packs/day: 0.50     Years: 34.00     Pack years: 17.00     Types:  Cigarettes     Last attempt to quit: 2019     Years since quittin.1     Smokeless tobacco: Never Used     Tobacco comment: started at age 11.  Quit during pregnancyX4.  Quit 3 months ago.    Substance Use Topics     Alcohol use: No     Alcohol/week: 0.0 standard drinks     Comment: quit      Drug use: No     Comment: past hx 22 years ago/ uppers downers, canibus     Family History   I have reviewed this patient's family history and updated it with pertinent information if needed.   Family History   Problem Relation Age of Onset     Hypertension Mother      Diabetes Mother      Blood Disease Mother      Heart Disease Mother         chf     Cerebrovascular Disease Mother      Hypertension Father      Cancer Father         lung cancer     Allergies Sister      Diabetes Sister      Depression Sister      Hypertension Sister      Prior to Admission Medications   Prior to Admission Medications   Prescriptions Last Dose Informant Patient Reported? Taking?   ARIPiprazole (ABILIFY) 10 MG tablet 2020 at 2000 Self No Yes   Sig: TAKE ONE TABLET BY MOUTH EVERY DAY   Patient taking differently: Take 10 mg by mouth every evening    EPINEPHrine (EPIPEN/ADRENACLICK/OR ANY BX GENERIC EQUIV) 0.3 MG/0.3ML injection 2-pack  Self No No   Sig: Inject 0.3 mLs (0.3 mg) into the muscle once as needed for anaphylaxis   VITAMIN D3 1000 units tablet 2020 at Unknown time Self No Yes   Sig: TAKE ONE TABLET BY MOUTH ONCE DAILY   Patient taking differently: Take 1,000 Units by mouth daily    acetaminophen (TYLENOL) 500 MG tablet 2020 at Unknown time Self Yes Yes   Sig: Take 500-1,000 mg by mouth every 6 hours as needed for mild pain   albuterol (PROAIR HFA/PROVENTIL HFA/VENTOLIN HFA) 108 (90 Base) MCG/ACT inhaler 2020 at Unknown time Self No Yes   Sig: Inhale 2 puffs into the lungs every 6 hours as needed for shortness of breath / dyspnea or wheezing   albuterol (PROVENTIL) (2.5 MG/3ML) 0.083% neb solution  1/21/2020 at Unknown time Self No Yes   Sig: Take 1 vial (2.5 mg) by nebulization every 6 hours as needed for shortness of breath / dyspnea or wheezing   allopurinol (ZYLOPRIM) 300 MG tablet 1/21/2020 at Unknown time Self No Yes   Sig: Take 1 tablet (300 mg) by mouth daily   buPROPion (WELLBUTRIN XL) 150 MG 24 hr tablet 1/22/2020 at Unknown time  No Yes   Sig: TAKE ONE TABLET BY MOUTH EVERY EVENING   clindamycin (CLEOCIN) 300 MG capsule   No No   Sig: Take 1 capsule (300 mg) by mouth 3 times daily   fluticasone-salmeterol (ADVAIR HFA) 230-21 MCG/ACT inhaler 1/21/2020 at 1700 Self No Yes   Sig: Inhale 2 puffs into the lungs 2 times daily   furosemide (LASIX) 20 MG tablet 1/21/2020 at Unknown time Self No Yes   Sig: 3 tab in am and 2 in pm.   furosemide (LASIX) 40 MG tablet   No No   Sig: TAKE ONE TABLET BY MOUTH TWICE A DAY   gabapentin (NEURONTIN) 300 MG capsule 1/21/2020 at Unknown time Self No Yes   Sig: TAKE ONE CAPSULE BY MOUTH ONCE DAILY AT BEDTIME WITH A 600 MG TABLET FOR A TOTAL DOSE  MG   Patient taking differently: Take 300 mg by mouth At Bedtime TAKE ONE CAPSULE BY MOUTH ONCE DAILY AT BEDTIME WITH A 600 MG TABLET FOR A TOTAL DOSE  MG   gabapentin (NEURONTIN) 600 MG tablet 1/21/2020 at Unknown time Self No Yes   Sig: TAKE ONE TABLET BY MOUTH THREE TIMES A DAY   Patient taking differently: Take 600 mg by mouth 3 times daily TAKE ONE TABLET BY MOUTH THREE TIMES A DAY   ipratropium - albuterol 0.5 mg/2.5 mg/3 mL (DUONEB) 0.5-2.5 (3) MG/3ML neb solution  Self No No   Sig: Take 1 vial (3 mLs) by nebulization every 6 hours as needed for shortness of breath / dyspnea or wheezing   lamoTRIgine (LAMICTAL) 100 MG tablet 1/23/2020 at 2000 Self No Yes   Sig: Take 1 tablet (100 mg) by mouth daily   levothyroxine (SYNTHROID/LEVOTHROID) 175 MCG tablet 1/21/2020 at Unknown time Self No Yes   Sig: Take 1 tablet (175 mcg) by mouth daily   mometasone-formoterol (DULERA) 200-5 MCG/ACT oral inhaler 1/21/2020 at  Unknown time Self No Yes   Sig: Inhale 2 puffs into the lungs 2 times daily   omeprazole (PRILOSEC) 20 MG DR capsule 2020 at Unknown time Self No Yes   Sig: TAKE ONE CAPSULE BY MOUTH TWICE A DAY   Patient taking differently: Take 20 mg by mouth 2 times daily    order for DME  Self No No   Sig: Equipment being ordered: INCONTINENCE PADS   QID   order for DME  Self No No   Sig: Equipment being ordered: DEPENDS SIZE LARGE   order for DME  Self Yes No   Sig: Equipment being ordered: CPAP  AIRSENSE 10  5-18 CM H20  SN# [id number]   DN# [id number]   order for DME  Self No No   Sig: Equipment being ordered: Nebulizer   order for DME  Self No No   Sig: Equipment being ordered:x2 [employer] 30/40mmHg compression wraps with x2 extra prs of compression liners   potassium chloride ER (K-DUR/KLOR-CON M) 10 MEQ CR tablet 2020 at Unknown time  No Yes   Sig: TAKE ONE TABLET BY MOUTH ONCE DAILY   pramipexole (MIRAPEX) 0.125 MG tablet 2020 at Unknown time Self No Yes   Sig: TAKE 1-2 TABLETS BY MOUTH AT BEDTIME   Patient taking differently: Take 0.125-2.5 mg by mouth At Bedtime TAKE 1-2 TABLETS BY MOUTH AT BEDTIME   predniSONE (DELTASONE) 20 MG tablet   No No   Sig: Take 2 tablets (40 mg) by mouth daily for 3 days, THEN 1 tablet (20 mg) daily for 4 days, THEN 0.5 tablets (10 mg) daily for 4 days.   simvastatin (ZOCOR) 20 MG tablet 2020 at Unknown time Self No Yes   Sig: TAKE ONE TABLET BY MOUTH EVERY NIGHT AT BEDTIME   Patient taking differently: Take 20 mg by mouth At Bedtime    traZODone (DESYREL) 150 MG tablet 2020 at Unknown time  No Yes   Sig: TAKE ONE TABLET BY MOUTH EVERY NIGHT AT BEDTIME   warfarin ANTICOAGULANT (JANTOVEN ANTICOAGULANT) 5 MG tablet 2020 at Unknown time  Yes Yes   Si.5 mg (5 mg x 2.5) every day OR AS DIRECTED BY [facility]      Facility-Administered Medications: None     Allergies   Allergies   Allergen Reactions     Darvocet [Propoxyphene N-Apap] Anaphylaxis     Darvocet,Percocet       Percocet [Oxycodone-Acetaminophen] Anaphylaxis, Hives and Swelling     Has tolerated hydromorphone in the past.      Asa [Aspirin] Hives     Aspirin causes seizures and hives, throat swelling.   Has tolerated ketorolac in the past.        Bee      Ibuprofen Swelling     Throat swelling per patient      Lyrica [Pregabalin] Other (See Comments) and Swelling     dizziness     Povidone Iodine Rash     Reaction to topical betadine     Tape [Adhesive Tape] Rash     Physical Exam   Vital Signs: Temp: 97.9  F (36.6  C) Temp src: Oral BP: 122/76 Pulse: 84   Resp: 20 SpO2: 98 % O2 Device: Nasal cannula Oxygen Delivery: 2 LPM  Weight: 242 lbs 4.57 oz    Constitutional: sitting up comfortably in the chair, awake, alert, cooperative, no apparent distress, and appears stated age  Eyes: Lids and lashes normal, extra ocular muscles intact, sclera clear, conjunctiva normal  ENT: Normocephalic, without obvious abnormality, atraumatic, oral pharynx with moist mucous membranes, tonsils without erythema or exudates.  Hematologic / Lymphatic: no cervical lymphadenopathy and no supraclavicular lymphadenopathy  Respiratory: No increased work of breathing, lung sounds distance, diminished bilaterally in the bases. Faint wheeze appreciated with forced expiration.  Cardiovascular: regular rate and rhythm, and no murmur noted. Bilateral lower extremity edema to the thigh.  GI: normal bowel sounds, soft, non-distended, non-tender  Genitounirinary: deferred  Skin: normal skin color, texture, turgor  Musculoskeletal: Normal bulk and tone. Moves all 4 extremities appropriately.  Neurologic: Awake, alert, oriented to name, place and time.  Neuropsychiatric: calm, pleasant, cooperative, appropriate thought process/content    Data   Data reviewed today: I reviewed all medications, new labs and imaging results over the last 24 hours. I personally reviewed no images or EKG's today.    Recent Labs   Lab 01/23/20  0444 01/22/20 2056   WBC 5.8 5.3   HGB  13.5 13.0   MCV 87 86   * 125*   INR  --  4.21*    143   POTASSIUM 3.7 3.2*   CHLORIDE 104 104   CO2 31 33*   BUN 8 8   CR 0.66 0.82   ANIONGAP 6 6   HOOD 8.5 8.7   * 152*   ALBUMIN  --  3.0*   PROTTOTAL  --  6.1*   BILITOTAL  --  0.4   ALKPHOS  --  133   ALT  --  16   AST  --  8     Recent Results (from the past 24 hour(s))   XR Chest 2 Views    Narrative    CHEST TWO VIEWS 1/22/2020 9:07 PM     HISTORY: Cough    COMPARISON: 1/8/2019    FINDINGS: The heart is enlarged. There is vascular engorgement and  interstitial thickening. No airspace consolidation, pneumothorax, or  pleural effusion.       Impression    IMPRESSION: Findings concerning for congestive heart failure.    SOMMER SUNG MD

## 2020-01-23 NOTE — PHARMACY-ANTICOAGULATION SERVICE
Clinical Pharmacy - Warfarin Dosing Consult     Pharmacy has been consulted to manage this patient s warfarin therapy.  Indication: DVT/PE Prophylaxis  Therapy Goal: INR 2-3  OP Anticoag Clinic: [facility]  Warfarin Prior to Admission: Yes  Warfarin PTA Regimen: 12.5mg daily  Recent documented change in oral intake/nutrition: Unknown    INR   Date Value Ref Range Status   01/22/2020 4.21 (H) 0.86 - 1.14 Final   01/14/2020 2.31 (H) 0.86 - 1.14 Final       Recommend warfarin 0 mg today.  Pharmacy will monitor Bia Bill daily and order warfarin doses to achieve specified goal.      Please contact pharmacy as soon as possible if the warfarin needs to be held for a procedure or if the warfarin goals change.      Tamela Singleton, PharmD

## 2020-01-24 ENCOUNTER — PATIENT OUTREACH (OUTPATIENT)
Dept: CARE COORDINATION | Facility: CLINIC | Age: 55
End: 2020-01-24

## 2020-01-24 ENCOUNTER — TELEPHONE (OUTPATIENT)
Dept: ANTICOAGULATION | Facility: CLINIC | Age: 55
End: 2020-01-24

## 2020-01-24 DIAGNOSIS — Z71.89 OTHER SPECIFIED COUNSELING: ICD-10-CM

## 2020-01-24 DIAGNOSIS — Z86.73 HISTORY OF STROKE: ICD-10-CM

## 2020-01-24 DIAGNOSIS — I82.409 DVT (DEEP VENOUS THROMBOSIS) (H): ICD-10-CM

## 2020-01-24 DIAGNOSIS — I73.9 PVD (PERIPHERAL VASCULAR DISEASE) WITH CLAUDICATION (H): ICD-10-CM

## 2020-01-24 SDOH — ECONOMIC STABILITY: FOOD INSECURITY: WITHIN THE PAST 12 MONTHS, YOU WORRIED THAT YOUR FOOD WOULD RUN OUT BEFORE YOU GOT MONEY TO BUY MORE.: NEVER TRUE

## 2020-01-24 SDOH — ECONOMIC STABILITY: FOOD INSECURITY: WITHIN THE PAST 12 MONTHS, THE FOOD YOU BOUGHT JUST DIDN'T LAST AND YOU DIDN'T HAVE MONEY TO GET MORE.: NEVER TRUE

## 2020-01-24 SDOH — ECONOMIC STABILITY: INCOME INSECURITY: HOW HARD IS IT FOR YOU TO PAY FOR THE VERY BASICS LIKE FOOD, HOUSING, MEDICAL CARE, AND HEATING?: NOT VERY HARD

## 2020-01-24 SDOH — ECONOMIC STABILITY: TRANSPORTATION INSECURITY
IN THE PAST 12 MONTHS, HAS THE LACK OF TRANSPORTATION KEPT YOU FROM MEDICAL APPOINTMENTS OR FROM GETTING MEDICATIONS?: NO

## 2020-01-24 SDOH — ECONOMIC STABILITY: TRANSPORTATION INSECURITY
IN THE PAST 12 MONTHS, HAS LACK OF TRANSPORTATION KEPT YOU FROM MEETINGS, WORK, OR FROM GETTING THINGS NEEDED FOR DAILY LIVING?: NO

## 2020-01-24 NOTE — PROGRESS NOTES
Clinic Care Coordination Contact  [facility]/Voicemail       Clinical Data: Care Coordinator Outreach    Outreach attempted x 1.  Left message on patient's voicemail with call back information and requested return call.    Patient was inpatient at [facility] from 1/22/20 to 1/23/20 with acute bronchitis and elevated blood sugar due to prednisone.     Plan: Will await return call from patient.  Care Coordinator will try to reach patient again in 1-2 business days.    Patient has PCP appointment scheduled for 1/29/2020.    Michela Whitaker RN, [facility] RN Coordinator  [facility]   1/24/2020    11:17 AM  [phone]

## 2020-01-24 NOTE — TELEPHONE ENCOUNTER
ANTICOAGULATION  MANAGEMENT: Discharge Review    Bia Bill chart reviewed for anticoagulation continuity of care    Hospital Admission on 1/22 to 1/23 for bronchitis/COPD.    Discharge disposition: Home    INR Results:    Recent Labs   Lab Test 01/23/20  0444   INR 3.20*       Warfarin inpatient management: dose lowered on 1-23 to 5 mg    Warfarin discharge instructions: home regimen continued     Medication changes affecting anticoagulation: Yes: prednisone taper    Additional factors affecting anticoagulation: Yes: bronchitis    Plan     Recommend alternating 12.5 mg and 10 mg due to prednisone use and current illness. Patient also running supra therapeutic before hospital    Spoke with Norah. She cannot recheck INR until 1-28 at the soonest. Lab appointment scheduled.    Anticoagulation Calendar updated    Mireille Torres RN

## 2020-01-24 NOTE — PROGRESS NOTES
"Clinic Care Coordination Contact    Follow Up Progress Note      Assessment: Patient returned clinic care coordinator's call.      Patient states she did not sleep that well during the night. Coughing Is worse when lying in bed. She is attempting to elevate her head with pillows.      Patient states she got the nebulizer machine. She is doing nebs as ordered. She states they make her very shaky, but they do clear her lungs.   Patient states she was sick to her stomach when she first woke this am. She went back to bed and now feels better when waking.     We discussed not to take prednisone on an empty stomach. Stay on bland diet for a couple of days if nauseated.     Patient states her legs look really good today. She states \"I have ankles.\"  She still has a rash on one leg. She states this was weeping previously but is improved now.     Patient states her friend will be staying with her this weekend. She states he is a smoker. Patient has stopped smoking. We had a conversation suggesting that friend smoke either in garage or outside so patient is not exposed directly. She agrees with this plan.     Goals addressed this encounter:   Goals Addressed                 This Visit's Progress      #1 Medical (pt-stated)   On track     Goal Statement: I want my cellulitis to heal.  Date goal set: 12/5/2019   Measure of Success: Cellulitis will be healed.  Barriers: current infection  Strengths: care coordination, lymphedema therapy  Date to Achieve By: 1/5/20  Patient expressed understanding of goal: yes    Action steps to achieve this goal  1. I will take and finish my antibiotics as prescribed  2. I will elevate my legs as much as possible for the first 48 hours  3. I will follow up with PCP as scheduled         #2 Medical (pt-stated)   On track     Goal Statement: I want my shortness of breath to improve    Date goal set: 11/1/19    Measure of Success: When I am not short of breath    Barriers: COPD    Strengths: " Motivated    Date to Achieve By: February 2020    Patient expressed understanding of goal: Yes    Action steps to achieve this goal  1. I will get my nebulizer fixed  2. I will use nebulizer as instructed  3. I will see PCP 12/11/19 as scheduled  4. I will use my asthma action plan         #3 Improve chronic symptoms (pt-stated)   30%     Goal Statement: I want my edema to improve.  Date goal set: 12/5/2019   Measure of Success: I will be able to put support stockings on by myself.  Barriers: lymphedema  Strengths: care coordination, lymphedema clinic  Date to Achieve By: 3/1/20  Patient expressed understanding of goal: yes    Action steps to achieve this goal  1. I will elevate my legs throughout the day  2. I will attend lymphedema clinic appointments  3. I will take my diuretics as prescribed         #4 Monitoring (pt-stated)   30%     Goal Statement: I will weigh myself every day.  Date goal set: 12/5/2019   Measure of Success: Consistent record of daily weights.  Barriers: Does not like scale  Strengths: care coordination support  Date to Achieve By: 2/11/20  Patient expressed understanding of goal: yes    Action steps to achieve this goal  1. I will weigh myself every morning.  2. I will write my weight down in a notebook.  3. I will take an extra 20-40 mg of Lasix if my weight increases by 3 lbs.         Intervention/Education provided during outreach: As above     Outreach Frequency: monthly    Plan:   - Patient will finish prednisone regime.   - Patient will discuss getting glucometer with PCP at appointment to monitor blood sugars. They were elevated while inpatient. - Patient will attend PCP appointment as scheduled for 1/29/2020.     Care Coordinator will follow up in 2-4 weeks.    Michela Whitaker RN, [facility] RN Coordinator  [facility]   1/24/2020    1:06 PM  [phone]

## 2020-01-24 NOTE — TELEPHONE ENCOUNTER
Patient was in the hospital for acute bronchitis from 1-22 to 1-23. Her INR was still supra therapeutic when admitted. It is not clear if she took her dose on 1-22 before going to the hospital ([facility] needs to verify this. Also was she taking 12.5 mg daily before admission).    She is on a prednisone taper of 20 mg for 4 days, 10 mg for 3 days and 5 mg for another 3 days.     She should check her INR at lab on 1-27 due to prednisone use and recently elevated INRs.    Message left for patient to call [facility] back to review and assess.    Mireille Torres RN, BSN, PHN  1- at 8:54am

## 2020-01-28 ENCOUNTER — ANTICOAGULATION THERAPY VISIT (OUTPATIENT)
Dept: ANTICOAGULATION | Facility: CLINIC | Age: 55
End: 2020-01-28

## 2020-01-28 DIAGNOSIS — I82.409 DVT (DEEP VENOUS THROMBOSIS) (H): ICD-10-CM

## 2020-01-28 DIAGNOSIS — I73.9 PVD (PERIPHERAL VASCULAR DISEASE) WITH CLAUDICATION (H): ICD-10-CM

## 2020-01-28 DIAGNOSIS — D75.1 ERYTHROCYTOSIS: ICD-10-CM

## 2020-01-28 DIAGNOSIS — Z86.73 HISTORY OF STROKE: ICD-10-CM

## 2020-01-28 LAB
ANISOCYTOSIS BLD QL SMEAR: ABNORMAL
BASOPHILS # BLD AUTO: 0 10E9/L (ref 0–0.2)
BASOPHILS NFR BLD AUTO: 0.4 %
DIFFERENTIAL METHOD BLD: ABNORMAL
EOSINOPHIL # BLD AUTO: 0 10E9/L (ref 0–0.7)
EOSINOPHIL NFR BLD AUTO: 0.4 %
ERYTHROCYTE [DISTWIDTH] IN BLOOD BY AUTOMATED COUNT: 21.8 % (ref 10–15)
HCT VFR BLD AUTO: 50.7 % (ref 35–47)
HGB BLD-MCNC: 14.4 G/DL (ref 11.7–15.7)
IMM GRANULOCYTES # BLD: 0 10E9/L (ref 0–0.4)
IMM GRANULOCYTES NFR BLD: 0.4 %
INR PPP: 2.75 (ref 0.86–1.14)
LYMPHOCYTES # BLD AUTO: 1.2 10E9/L (ref 0.8–5.3)
LYMPHOCYTES NFR BLD AUTO: 17.3 %
MCH RBC QN AUTO: 24.7 PG (ref 26.5–33)
MCHC RBC AUTO-ENTMCNC: 28.4 G/DL (ref 31.5–36.5)
MCV RBC AUTO: 87 FL (ref 78–100)
MONOCYTES # BLD AUTO: 0.4 10E9/L (ref 0–1.3)
MONOCYTES NFR BLD AUTO: 6.3 %
NEUTROPHILS # BLD AUTO: 5.2 10E9/L (ref 1.6–8.3)
NEUTROPHILS NFR BLD AUTO: 75.2 %
NRBC # BLD AUTO: 0 10*3/UL
NRBC BLD AUTO-RTO: 0 /100
PLATELET # BLD AUTO: 116 10E9/L (ref 150–450)
PLATELET # BLD EST: ABNORMAL 10*3/UL
RBC # BLD AUTO: 5.84 10E12/L (ref 3.8–5.2)
WBC # BLD AUTO: 7 10E9/L (ref 4–11)

## 2020-01-28 PROCEDURE — 36415 COLL VENOUS BLD VENIPUNCTURE: CPT | Performed by: FAMILY MEDICINE

## 2020-01-28 PROCEDURE — 99207 ZZC NO CHARGE NURSE ONLY: CPT

## 2020-01-28 PROCEDURE — 85025 COMPLETE CBC W/AUTO DIFF WBC: CPT | Performed by: FAMILY MEDICINE

## 2020-01-28 PROCEDURE — 85610 PROTHROMBIN TIME: CPT | Performed by: FAMILY MEDICINE

## 2020-01-28 NOTE — PROGRESS NOTES
20    Writer spoke with patient and verified dosing.    Reina Forrest RN, BSN, PHN  Anticoagulation Clinic   [phone]        ANTICOAGULATION FOLLOW-UP CLINIC VISIT    Patient Name:  Bia Bill  Date:  2020  Contact Type:  Telephone/ Left DVM    SUBJECTIVE:  Patient Findings     Positives:   Change in medications (Prednisone)    Comments:   Writer left DVM. Requested a call back to [facility].  Writer adjusted dose to 77.5 mg weekly.  Recheck in 1 week.        Clinical Outcomes     Negatives:   Major bleeding event, Thromboembolic event, Anticoagulation-related hospital admission, Anticoagulation-related ED visit, Anticoagulation-related fatality    Comments:   Writer left DVM. Requested a call back to [facility].  Writer adjusted dose to 77.5 mg weekly.  Recheck in 1 week.           OBJECTIVE    INR   Date Value Ref Range Status   2020 2.75 (H) 0.86 - 1.14 Final       ASSESSMENT / PLAN  INR assessment THER    Recheck INR In: 1 WEEK    INR Location Outside lab      Anticoagulation Summary  As of 2020    INR goal:   2.0-3.0   TTR:   35.9 % (11.2 mo)   INR used for dosin.75 (2020)   Warfarin maintenance plan:   12.5 mg (5 mg x 2.5) every Mon, Wed, Sat; 10 mg (5 mg x 2) all other days   Full warfarin instructions:   12.5 mg every Mon, Wed, Sat; 10 mg all other days   Weekly warfarin total:   77.5 mg   Plan last modified:   Reina Forrest RN (2020)   Next INR check:   2020   Priority:   High   Target end date:   Indefinite    Indications    PVD (peripheral vascular disease) with claudication (H) [I73.9]  Long term current use of anticoagulant therapy (Resolved) [Z79.01]  DVT (deep venous thrombosis) (H) [I82.409]  History of stroke symptoms  stroke ruled out on imaging [Z86.73]             Anticoagulation Episode Summary     INR check location:       Preferred lab:       Send INR reminders to:   [facility] PHONE ANTICOAG POOL    Comments:   * lab draw due to elevated hematocrit  (fingerstick meters don't work). LEFT LE. STENT x 3 LEFT UPPER LEG. Previous arterial clot. AS of 10/9/19 Goal range 2.0-3.0.      Anticoagulation Care Providers     Provider Role Specialty Phone number    Kd Leong MD [facility] [phone]            See the Encounter Report to view Anticoagulation Flowsheet and Dosing Calendar (Go to Encounters tab in chart review, and find the Anticoagulation Therapy Visit)        Reina Forrest RN

## 2020-01-29 ENCOUNTER — OFFICE VISIT (OUTPATIENT)
Dept: FAMILY MEDICINE | Facility: CLINIC | Age: 55
End: 2020-01-29
Payer: COMMERCIAL

## 2020-01-29 VITALS
WEIGHT: 234.2 LBS | DIASTOLIC BLOOD PRESSURE: 63 MMHG | RESPIRATION RATE: 18 BRPM | OXYGEN SATURATION: 97 % | HEART RATE: 76 BPM | BODY MASS INDEX: 48.95 KG/M2 | TEMPERATURE: 97.7 F | SYSTOLIC BLOOD PRESSURE: 139 MMHG

## 2020-01-29 DIAGNOSIS — E11.9 TYPE 2 DIABETES MELLITUS WITHOUT COMPLICATION, WITHOUT LONG-TERM CURRENT USE OF INSULIN (H): Primary | ICD-10-CM

## 2020-01-29 DIAGNOSIS — I89.0 LYMPHEDEMA OF BOTH LOWER EXTREMITIES: ICD-10-CM

## 2020-01-29 PROCEDURE — 99214 OFFICE O/P EST MOD 30 MIN: CPT | Performed by: FAMILY MEDICINE

## 2020-01-29 RX ORDER — FUROSEMIDE 40 MG
40 TABLET ORAL 2 TIMES DAILY
Qty: 180 TABLET | Refills: 4 | Status: ON HOLD | OUTPATIENT
Start: 2020-01-29 | End: 2020-11-19

## 2020-01-29 RX ORDER — GLUCOSAMINE HCL/CHONDROITIN SU 500-400 MG
CAPSULE ORAL
Qty: 100 EACH | Refills: 3 | Status: SHIPPED | OUTPATIENT
Start: 2020-01-29 | End: 2021-02-22

## 2020-01-29 RX ORDER — LANCETS
EACH MISCELLANEOUS
Qty: 1 EACH | Refills: 6 | Status: SHIPPED | OUTPATIENT
Start: 2020-01-29 | End: 2021-03-23

## 2020-01-29 ASSESSMENT — ASTHMA QUESTIONNAIRES
QUESTION_5 LAST FOUR WEEKS HOW WOULD YOU RATE YOUR ASTHMA CONTROL: SOMEWHAT CONTROLLED
ACT_TOTALSCORE: 18
QUESTION_3 LAST FOUR WEEKS HOW OFTEN DID YOUR ASTHMA SYMPTOMS (WHEEZING, COUGHING, SHORTNESS OF BREATH, CHEST TIGHTNESS OR PAIN) WAKE YOU UP AT NIGHT OR EARLIER THAN USUAL IN THE MORNING: ONCE OR TWICE
QUESTION_2 LAST FOUR WEEKS HOW OFTEN HAVE YOU HAD SHORTNESS OF BREATH: ONCE OR TWICE A WEEK
QUESTION_1 LAST FOUR WEEKS HOW MUCH OF THE TIME DID YOUR ASTHMA KEEP YOU FROM GETTING AS MUCH DONE AT WORK, SCHOOL OR AT HOME: A LITTLE OF THE TIME
QUESTION_4 LAST FOUR WEEKS HOW OFTEN HAVE YOU USED YOUR RESCUE INHALER OR NEBULIZER MEDICATION (SUCH AS ALBUTEROL): TWO OR THREE TIMES PER WEEK

## 2020-01-29 ASSESSMENT — PAIN SCALES - GENERAL: PAINLEVEL: SEVERE PAIN (6)

## 2020-01-29 NOTE — PROGRESS NOTES
SUBJECTIVE:                                                    Bia Bill is a 54 year old female who presents to clinic today for the following health issues:    Hospital Follow-up Visit:    Hospital/Nursing Home/IP Rehab Facility: [facility]  Date of Admission: 01/22/2020  Date of Discharge: 01/23/2020  Reason(s) for Admission: bronchitis            Problems taking medications regularly:  None       Medication changes since discharge: None       Problems adhering to non-medication therapy:  None    Summary of hospitalization:  [facility] discharge summary reviewed  Diagnostic Tests/Treatments reviewed.  Follow up needed: none  Other Healthcare Providers Involved in Patient s Care:         None  Update since discharge: improved.     Post Discharge Medication Reconciliation: discharge medications reconciled, continue medications without change.  Plan of care communicated with patient     Coding guidelines for this visit:  Type of Medical   Decision Making Face-to-Face Visit       within 7 Days of discharge Face-to-Face Visit        within 14 days of discharge   Moderate Complexity 45971 [id number]   High Complexity 32346 [id number]              Problem list and histories reviewed & adjusted, as indicated.  Additional history:         ROS:  Constitutional, HEENT, cardiovascular, pulmonary, gi and gu systems are negative, except as otherwise noted.    OBJECTIVE:                                                    LMP 09/27/2009  There is no height or weight on file to calculate BMI.   GENERAL: healthy, alert, well nourished, well hydrated, no distress  HENT: ear canals- normal; TMs- normal; Nose- normal; Mouth- no ulcers, no lesions  NECK: no tenderness, no adenopathy, no asymmetry, no masses, no stiffness; thyroid- normal to palpation  RESP: lungs clear to auscultation - no rales, no rhonchi, no wheezes  CV: regular rates and rhythm, normal S1 S2, no S3 or S4 and no murmur, no click or rub -  ABDOMEN:  soft, no tenderness, no  hepatosplenomegaly, no masses, normal bowel sounds       ASSESSMENT/PLAN:                                                      (E11.9) Type 2 diabetes mellitus without complication, without long-term current use of insulin (H)  (primary encounter diagnosis)   Lab Results   Component Value Date    A1C 7.2 01/23/2020    A1C 6.1 10/29/2019    A1C 6.2 12/29/2016    A1C 5.8 09/10/2015    A1C 5.4 07/10/2014       Good control.  Continue currant medications, continue to monito    Plan: blood glucose monitoring (NO BRAND SPECIFIED)         meter device kit, blood glucose (NO BRAND         SPECIFIED) test strip, blood glucose         calibration (NO BRAND SPECIFIED) solution, thin        (NO BRAND SPECIFIED) lancets, alcohol swab prep        pads, metFORMIN (GLUCOPHAGE) 500 MG tablet,         AMBULATORY ADULT DIABETES EDUCATOR REFERRAL      (I89.0) Lymphedema of both lower extremities  Good control.  Continue currant medications, continue to monito   Plan: furosemide (LASIX) 40 MG tablet     reports that she quit smoking about 2 months ago. Her smoking use included cigarettes. She has a 17.00 pack-year smoking history. She has never used smokeless tobacco.  Tobacco Cessation Action Plan: Information offered: Patient not interested at this time    Weight management plan: Discussed healthy diet and exercise guidelines      [facility]

## 2020-01-30 ENCOUNTER — OFFICE VISIT (OUTPATIENT)
Dept: FAMILY MEDICINE | Facility: CLINIC | Age: 55
End: 2020-01-30
Payer: COMMERCIAL

## 2020-01-30 ENCOUNTER — PATIENT OUTREACH (OUTPATIENT)
Dept: CARE COORDINATION | Facility: CLINIC | Age: 55
End: 2020-01-30

## 2020-01-30 ENCOUNTER — TELEPHONE (OUTPATIENT)
Dept: FAMILY MEDICINE | Facility: CLINIC | Age: 55
End: 2020-01-30

## 2020-01-30 VITALS
OXYGEN SATURATION: 95 % | HEIGHT: 58 IN | DIASTOLIC BLOOD PRESSURE: 64 MMHG | TEMPERATURE: 98.4 F | SYSTOLIC BLOOD PRESSURE: 122 MMHG | RESPIRATION RATE: 16 BRPM | HEART RATE: 74 BPM | BODY MASS INDEX: 48.66 KG/M2 | WEIGHT: 231.8 LBS

## 2020-01-30 DIAGNOSIS — F31.62 MODERATE MIXED BIPOLAR I DISORDER (H): ICD-10-CM

## 2020-01-30 DIAGNOSIS — E11.9 TYPE 2 DIABETES MELLITUS WITHOUT COMPLICATION, WITHOUT LONG-TERM CURRENT USE OF INSULIN (H): ICD-10-CM

## 2020-01-30 DIAGNOSIS — Z00.00 ROUTINE GENERAL MEDICAL EXAMINATION AT A HEALTH CARE FACILITY: Primary | ICD-10-CM

## 2020-01-30 DIAGNOSIS — F34.1 PERSONALITY DISORDER, DEPRESSIVE: ICD-10-CM

## 2020-01-30 DIAGNOSIS — J44.1 COPD EXACERBATION (H): ICD-10-CM

## 2020-01-30 PROCEDURE — 99396 PREV VISIT EST AGE 40-64: CPT | Performed by: FAMILY MEDICINE

## 2020-01-30 ASSESSMENT — ASTHMA QUESTIONNAIRES: ACT_TOTALSCORE: 18

## 2020-01-30 ASSESSMENT — MIFFLIN-ST. JEOR: SCORE: 1546.19

## 2020-01-30 ASSESSMENT — PATIENT HEALTH QUESTIONNAIRE - PHQ9: SUM OF ALL RESPONSES TO PHQ QUESTIONS 1-9: 11

## 2020-01-30 NOTE — TELEPHONE ENCOUNTER
Diabetes Education Scheduling Outreach #1:    Call to patient to schedule. Left message with phone number to call to schedule.    Plan for 2nd outreach attempt within 1 week.    Oma Olivarez  [facility]  Diabetes and Nutrition Scheduling

## 2020-01-30 NOTE — PROGRESS NOTES
"Clinic Care Coordination Contact    Follow Up Progress Note      Assessment: patient calling clinic care coordinator.    Patient states \"I am a diabetic, I am type II.\"  Patient states she started on Metformin. She is to check her blood sugar once a day, but she does not know when or what range would be good. (A1C is 7.2)    We discussed to check her blood sugar first thing in the morning, after she weighs herself and she is still fasting. Normal range would be 90 -100. If she can stay around 100-120 that would be fantastic to start. Patient is still on prednisone as well.     We discussed diet, concentrated sugar, carbohydrates,portion control . She has an appointment with diabetic education on 2/21/20.    We discussed signs and symptoms of low blood sugar. Encouraged her to check her blood sugar if she was experiencing shakiness, sweating, light headed, confused.     Goals addressed this encounter:   Goals Addressed                 This Visit's Progress      #1 Medical (pt-stated)   On track     Goal Statement: I want my cellulitis to heal.  Date goal set: 12/5/2019   Measure of Success: Cellulitis will be healed.  Barriers: current infection  Strengths: care coordination, lymphedema therapy  Date to Achieve By: April 2020   Patient expressed understanding of goal: yes    Action steps to achieve this goal  1. I will take and finish my antibiotics as prescribed  2. I will elevate my legs as much as possible for the first 48 hours  3. I will follow up with PCP as scheduled         #2 Medical (pt-stated)   On track     Goal Statement: I want my shortness of breath to improve    Date goal set: 11/1/19    Measure of Success: When I am not short of breath    Barriers: COPD    Strengths: Motivated    Date to Achieve By: April 2020    Patient expressed understanding of goal: Yes    Action steps to achieve this goal  1. I will get my nebulizer fixed - got new machine 1/2020  2. I will use nebulizer as instructed  3. I will " see PCP  as scheduled  4. I will use my asthma action plan         #3 Improve chronic symptoms (pt-stated)   40%     Goal Statement: I want my edema to improve.  Date goal set: 12/5/2019   Measure of Success: I will be able to put support stockings on by myself.  Barriers: lymphedema  Strengths: care coordination, lymphedema clinic  Date to Achieve By: 3/1/20  Patient expressed understanding of goal: yes    Action steps to achieve this goal  1. I will elevate my legs throughout the day  2. I will attend lymphedema clinic appointments  3. I will take my diuretics as prescribed         #4 Monitoring (pt-stated)   40%     Goal Statement: I will weigh myself every day.  Date goal set: 12/5/2019   Measure of Success: Consistent record of daily weights.  Barriers: Does not like scale  Strengths: care coordination support  Date to Achieve By: April 2020  Patient expressed understanding of goal: yes    Action steps to achieve this goal  1. I will weigh myself every morning.  2. I will write my weight down in a notebook.  3. I will take an extra 20-40 mg of Lasix if my weight increases by 3 lbs.         Intervention/Education provided during outreach: As above     Outreach Frequency: monthly    Plan:   Patient will check fasting blood sugar daily and record.  Patient will take Metformin as prescribed.   Patient will call clinic care coordinator with questions or concerns.      Care Coordinator will follow up in one month.

## 2020-01-30 NOTE — LETTER
[facility]  Complex Care Plan  About Me:    Patient Name:  Bia Bill    YOB: 1965  Age:         54 year old   [facility] MRN:    [id number] Telephone Information:  Home Phone [phone]   Mobile [phone]       Address:  [address] [place] [address] Email address:  No e-mail address on record      Emergency Contact(s)    Name Relationship Lgl Grd Work Phone Home Phone Mobile Phone   1. GREGORY CARLSON Significant ot*  none [phone]   2. MAE MUSTAFA Daughter  [phone]           Primary language:  English     needed? No   [facility] [employer]:  [phone] op. 1  Other communication barriers: None  Preferred Method of Communication:  Mail  Current living arrangement:    Mobility Status/ Medical Equipment:      Health Maintenance  Health Maintenance Reviewed:      My Access Plan  Medical Emergency 911   Primary Clinic Line [facility] [phone]   24 Hour Appointment Line [phone] or  [phone]) (toll-free)   24 Hour Nurse Line [phone] (toll-free)   Preferred Urgent Care     Preferred Hospital     Preferred Pharmacy [place], [place] - [facility]     Behavioral Health Crisis Line The National Suicide Prevention Lifeline at 1-553.758.5543 or 911             My Care Team Members  Patient Care Team       Relationship Specialty Notifications Start End    Kd Leong MD PCP - General Family Practice  5/26/16     Phone: [phone] Fax: [phone] [address] [place] [address]    Karl Coto    8/19/16     [facility]     Phone: [phone]         Kd Leong MD Assigned PCP   1/1/17     Phone: [phone] Fax: [phone] [address] [place] [address]    Michela Whitaker, TAWANA Lead Care Coordinator Primary Care - [facility] Admissions 11/1/19     Phone:  [phone]                 My Care Plans  Self Management and Treatment Plan  Goals and (Comments)  Goals        General    #1 Medical (pt-stated)     Notes - Note edited  1/30/2020  3:32 PM by Michela Whitaker RN    Goal Statement: I want my cellulitis to heal.  Date goal set: 12/5/2019   Measure of Success: Cellulitis will be healed.  Barriers: current infection  Strengths: care coordination, lymphedema therapy  Date to Achieve By: April 2020   Patient expressed understanding of goal: yes    Action steps to achieve this goal  1. I will take and finish my antibiotics as prescribed  2. I will elevate my legs as much as possible for the first 48 hours  3. I will follow up with PCP as scheduled       #2 Medical (pt-stated)     Notes - Note edited  1/30/2020  3:33 PM by Michela Whitaker RN    Goal Statement: I want my shortness of breath to improve    Date goal set: 11/1/19    Measure of Success: When I am not short of breath    Barriers: COPD    Strengths: Motivated    Date to Achieve By: April 2020    Patient expressed understanding of goal: Yes    Action steps to achieve this goal  1. I will get my nebulizer fixed - got new machine 1/2020  2. I will use nebulizer as instructed  3. I will see PCP  as scheduled  4. I will use my asthma action plan       #3 Improve chronic symptoms (pt-stated)     Notes - Note created  12/5/2019 11:39 AM by Behl, Melissa K, RN    Goal Statement: I want my edema to improve.  Date goal set: 12/5/2019   Measure of Success: I will be able to put support stockings on by myself.  Barriers: lymphedema  Strengths: care coordination, lymphedema clinic  Date to Achieve By: 3/1/20  Patient expressed understanding of goal: yes    Action steps to achieve this goal  1. I will elevate my legs throughout the day  2. I will attend lymphedema clinic appointments  3. I will take my diuretics as prescribed       #4 Monitoring (pt-stated)     Notes - Note edited  1/30/2020  3:34 PM by Michela Whitaker RN    Goal  Statement: I will weigh myself every day.  Date goal set: 12/5/2019   Measure of Success: Consistent record of daily weights.  Barriers: Does not like scale  Strengths: care coordination support  Date to Achieve By: April 2020  Patient expressed understanding of goal: yes    Action steps to achieve this goal  1. I will weigh myself every morning.  2. I will write my weight down in a notebook.  3. I will take an extra 20-40 mg of Lasix if my weight increases by 3 lbs.              Action Plans on File:   Asthma        Depression          Advance Care Plans/Directives Type:        My Medical and Care Information  Problem List   Patient Active Problem List   Diagnosis     Mild persistent asthma     Polyneuropathy in other diseases classified elsewhere (H)     DVT (deep venous thrombosis) (H)     Alcohol abuse, in remission     SECONDARY POLYCYTHEMIA     Chondromalacia of patella     Developmental reading disorder     Esophageal reflux     Hypothyroidism     Fatty liver     GILES (Obstructive Sleep Apnea)-Moderate (AHI 16)     RLS (restless legs syndrome)     Smoker     Erythrocytosis     Moderate mixed bipolar I disorder (H)     Personality disorder, depressive     COPD (chronic obstructive pulmonary disease) (H)     Rosacea     Health Care Home     DDD (degenerative disc disease), cervical     Benign neoplasm of colon (POLYPOSIS)     History of stroke symptoms, stroke ruled out on imaging     Somatization disorder     Sebaceous cyst of right axilla     HTN, goal below 140/90     Left leg pain     Vitamin D deficiency     Morbid obesity (H)     PVD (peripheral vascular disease) with claudication (H)     Cyst of left ovary     Morbid obesity with alveolar hypoventilation (H)     Severe episode of recurrent major depressive disorder (H)     Conductive hearing loss of left ear with restricted hearing of right ear     Sensorineural hearing loss (SNHL) of right ear with restricted hearing of left ear     Chronic gout of hand  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    Care Coordination Start Date: 11/1/2019   Frequency of Care Coordination: monthly   Form Last Updated: 01/30/2020

## 2020-01-30 NOTE — PROGRESS NOTES
SUBJECTIVE:   CC: Bia Bill is an 54 year old woman who presents for preventive health visit.     Healthy Habits:    Do you get at least three servings of calcium containing foods daily (dairy, green leafy vegetables, etc.)? no, taking calcium and/or vitamin D supplement: yes - vitamin D only    Amount of exercise or daily activities, outside of work: none    Problems taking medications regularly No    Medication side effects: No    Have you had an eye exam in the past two years? yes    Do you see a dentist twice per year? no    Do you have sleep apnea, excessive snoring or daytime drowsiness?yes, pt has sleep apnea but not using a c-pap machine currently, Did a new study through [facility] but has not heard back on results yet.     No other concerns.  Appt with diabetic educator on 2/21/20.    Today's PHQ-2 Score:   PHQ-2 ( 1999 [employer]) 1/30/2020 5/30/2017   Q1: Little interest or pleasure in doing things 0 1   Q2: Feeling down, depressed or hopeless 0 0   PHQ-2 Score 0 1       Abuse: Current or Past(Physical, Sexual or Emotional)- No  Do you feel safe in your environment? Yes        Social History     Tobacco Use     Smoking status: Current Every Day Smoker     Packs/day: 0.50     Years: 43.00     Pack years: 21.50     Types: Cigarettes     Smokeless tobacco: Never Used     Tobacco comment: started at age 11.  Quit during pregnancyX4.  Quit 3 months ago.    Substance Use Topics     Alcohol use: No     Alcohol/week: 0.0 standard drinks     Comment: quit 1995     If you drink alcohol do you typically have >3 drinks per day or >7 drinks per week? No                     Reviewed orders with patient.  Reviewed health maintenance and updated orders accordingly - Yes  BP Readings from Last 3 Encounters:   01/30/20 122/64   01/29/20 139/63   01/23/20 (!) 143/60    Wt Readings from Last 3 Encounters:   01/30/20 105.1 kg (231 lb 12.8 oz)   01/29/20 106.2 kg (234 lb 3.2 oz)   01/23/20 109.9 kg (242 lb 4.6 oz)                   Patient Active Problem List   Diagnosis     Mild persistent asthma     Polyneuropathy in other diseases classified elsewhere (H)     DVT (deep venous thrombosis) (H)     Alcohol abuse, in remission     SECONDARY POLYCYTHEMIA     Chondromalacia of patella     Developmental reading disorder     Esophageal reflux     Hypothyroidism     Fatty liver     GILES (Obstructive Sleep Apnea)-Moderate (AHI 16)     RLS (restless legs syndrome)     Smoker     Erythrocytosis     Moderate mixed bipolar I disorder (H)     Personality disorder, depressive     COPD (chronic obstructive pulmonary disease) (H)     Rosacea     Health Care Home     DDD (degenerative disc disease), cervical     Benign neoplasm of colon (POLYPOSIS)     History of stroke symptoms, stroke ruled out on imaging     Somatization disorder     Sebaceous cyst of right axilla     HTN, goal below 140/90     Left leg pain     Vitamin D deficiency     Morbid obesity (H)     PVD (peripheral vascular disease) with claudication (H)     Cyst of left ovary     Morbid obesity with alveolar hypoventilation (H)     Severe episode of recurrent major depressive disorder (H)     Conductive hearing loss of left ear with restricted hearing of right ear     Sensorineural hearing loss (SNHL) of right ear with restricted hearing of left ear     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     COPD exacerbation (H)     Lymphedema of both lower extremities     Type 2 diabetes mellitus (H)     Bilateral edema of lower extremity     Dyspnea     Past Surgical History:   Procedure Laterality Date     BONE MARROW BIOPSY, BONE SPECIMEN, NEEDLE/TROCAR N/A 11/17/2014    Procedure: BIOPSY BONE MARROW;  Surgeon: Hay Aaron MD;  Location: [facility]     D & C  10/26/09    with uterine ablation     ENDOVASCULAR PLACEMENT VASCULAR DEVICE Left     Left leg stent x 3     ESOPHAGOSCOPY, GASTROSCOPY, DUODENOSCOPY (EGD), COMBINED   4/21/2014    Procedure: Gastroscopy;  Surgeon: Moris Thomas MD;  Location: [facility]     HYSTERECTOMY, PAP NO LONGER INDICATED  1-4-2010     LAPAROSCOPIC CHOLECYSTECTOMY N/A 1/4/2019    Procedure: Laparoscopic cholecystectomy;  Surgeon: Aaron Siegel MD;  Location: [facility]     LITHOTRIPSY  2004    Lithotrypsy       Social History     Tobacco Use     Smoking status: Current Every Day Smoker     Packs/day: 0.50     Years: 43.00     Pack years: 21.50     Types: Cigarettes     Smokeless tobacco: Never Used     Tobacco comment: started at age 11.  Quit during pregnancyX4.  Quit 3 months ago.    Substance Use Topics     Alcohol use: No     Alcohol/week: 0.0 standard drinks     Comment: quit 1995     Family History   Problem Relation Age of Onset     Hypertension Mother      Diabetes Mother      Blood Disease Mother      Heart Disease Mother         chf     Cerebrovascular Disease Mother      Hypertension Father      Cancer Father         lung cancer     Allergies Sister      Diabetes Sister      Depression Sister      Hypertension Sister          Current Outpatient Medications   Medication Sig Dispense Refill     albuterol (PROAIR HFA/PROVENTIL HFA/VENTOLIN HFA) 108 (90 Base) MCG/ACT inhaler Inhale 2 puffs into the lungs every 6 hours as needed for shortness of breath / dyspnea or wheezing 1 Inhaler 11     albuterol (PROVENTIL) (2.5 MG/3ML) 0.083% neb solution Take 1 vial (2.5 mg) by nebulization every 6 hours as needed for shortness of breath / dyspnea or wheezing 1 Box 0     allopurinol (ZYLOPRIM) 300 MG tablet Take 1 tablet (300 mg) by mouth daily 90 tablet 3     ARIPiprazole (ABILIFY) 10 MG tablet TAKE ONE TABLET BY MOUTH EVERY DAY (Patient taking differently: Take 10 mg by mouth every evening ) 90 tablet 1     buPROPion (WELLBUTRIN XL) 150 MG 24 hr tablet TAKE ONE TABLET BY MOUTH EVERY EVENING 150 tablet 0     fluticasone-salmeterol (ADVAIR HFA) 230-21 MCG/ACT inhaler Inhale 2 puffs into the lungs 2 times daily 1  Inhaler 3     furosemide (LASIX) 40 MG tablet Take 1 tablet (40 mg) by mouth 2 times daily 180 tablet 4     gabapentin (NEURONTIN) 300 MG capsule TAKE ONE CAPSULE BY MOUTH ONCE DAILY AT BEDTIME WITH A 600 MG TABLET FOR A TOTAL DOSE  MG (Patient taking differently: Take 300 mg by mouth At Bedtime TAKE ONE CAPSULE BY MOUTH ONCE DAILY AT BEDTIME WITH A 600 MG TABLET FOR A TOTAL DOSE  MG) 90 capsule 0     gabapentin (NEURONTIN) 600 MG tablet TAKE ONE TABLET BY MOUTH THREE TIMES A DAY (Patient taking differently: Take 600 mg by mouth 3 times daily TAKE ONE TABLET BY MOUTH THREE TIMES A DAY) 90 tablet 3     ipratropium - albuterol 0.5 mg/2.5 mg/3 mL (DUONEB) 0.5-2.5 (3) MG/3ML neb solution Take 1 vial (3 mLs) by nebulization every 6 hours as needed for shortness of breath / dyspnea or wheezing 30 vial 11     lamoTRIgine (LAMICTAL) 100 MG tablet Take 1 tablet (100 mg) by mouth daily 90 tablet 3     levothyroxine (SYNTHROID/LEVOTHROID) 175 MCG tablet Take 1 tablet (175 mcg) by mouth daily 90 tablet 0     metFORMIN (GLUCOPHAGE) 500 MG tablet Take 1 tablet (500 mg) by mouth 2 times daily (with meals) 90 tablet 1     mometasone-formoterol (DULERA) 200-5 MCG/ACT oral inhaler Inhale 2 puffs into the lungs 2 times daily 13 g 1     omeprazole (PRILOSEC) 20 MG DR capsule TAKE ONE CAPSULE BY MOUTH TWICE A DAY (Patient taking differently: Take 20 mg by mouth 2 times daily ) 180 capsule 0     potassium chloride ER (K-DUR/KLOR-CON M) 10 MEQ CR tablet TAKE ONE TABLET BY MOUTH ONCE DAILY 90 tablet 0     pramipexole (MIRAPEX) 0.125 MG tablet TAKE 1-2 TABLETS BY MOUTH AT BEDTIME (Patient taking differently: Take 0.125-2.5 mg by mouth At Bedtime TAKE 1-2 TABLETS BY MOUTH AT BEDTIME) 60 tablet 10     predniSONE (DELTASONE) 5 MG tablet Take 4 tablets (20 mg) by mouth daily for 4 days, THEN 2 tablets (10 mg) daily for 3 days, THEN 1 tablet (5 mg) daily for 3 days. 25 tablet 0     simvastatin (ZOCOR) 20 MG tablet TAKE ONE TABLET BY  MOUTH EVERY NIGHT AT BEDTIME (Patient taking differently: Take 20 mg by mouth At Bedtime ) 90 tablet 3     traZODone (DESYREL) 150 MG tablet TAKE ONE TABLET BY MOUTH EVERY NIGHT AT BEDTIME 90 tablet 1     VITAMIN D3 1000 units tablet TAKE ONE TABLET BY MOUTH ONCE DAILY (Patient taking differently: Take 1,000 Units by mouth daily ) 100 tablet 2     warfarin ANTICOAGULANT (JANTOVEN ANTICOAGULANT) 5 MG tablet 12.5 mg (5 mg x 2.5) every day OR AS DIRECTED BY  tablet 0     acetaminophen (TYLENOL) 500 MG tablet Take 500-1,000 mg by mouth every 6 hours as needed for mild pain       alcohol swab prep pads Use to swab area of injection/mike as directed. 100 each 3     blood glucose (NO BRAND SPECIFIED) test strip Use to test blood sugar daily or as directed. To accompany: Blood Glucose Monitor Brands: per insurance. 100 strip 6     blood glucose calibration (NO BRAND SPECIFIED) solution To accompany: Blood Glucose Monitor Brands: per insurance. 1 Bottle 3     blood glucose monitoring (NO BRAND SPECIFIED) meter device kit Use to test blood sugar daily or as directed. Preferred blood glucose meter OR supplies to accompany: Blood Glucose Monitor Brands: per insurance. 1 kit 0     EPINEPHrine (EPIPEN/ADRENACLICK/OR ANY BX GENERIC EQUIV) 0.3 MG/0.3ML injection 2-pack Inject 0.3 mLs (0.3 mg) into the muscle once as needed for anaphylaxis 0.6 mL 0     order for DME Equipment being ordered:x2 [employer] 30/40mmHg compression wraps with x2 extra prs of compression liners 1 each 0     order for DME Equipment being ordered: Nebulizer 1 Device 0     order for DME Equipment being ordered: CPAP  AIRSENSE 10  5-18 CM H20  # [id number]   DN# [id number]       order for DME Equipment being ordered: DEPENDS SIZE LARGE 60 Month 5     order for DME Equipment being ordered: INCONTINENCE PADS   QID 1 Month 11     thin (NO BRAND SPECIFIED) lancets Use with lanceting device. To accompany: Blood Glucose Monitor Brands: per insurance. 1 each 6      Allergies   Allergen Reactions     Darvocet [Propoxyphene N-Apap] Anaphylaxis     Darvocet,Percocet      Percocet [Oxycodone-Acetaminophen] Anaphylaxis, Hives and Swelling     Has tolerated hydromorphone in the past.      Asa [Aspirin] Hives     Aspirin causes seizures and hives, throat swelling.   Has tolerated ketorolac in the past.        Bee      Ibuprofen Swelling     Throat swelling per patient      Lyrica [Pregabalin] Other (See Comments) and Swelling     dizziness     Povidone Iodine Rash     Reaction to topical betadine     Tape [Adhesive Tape] Rash       Mammogram Screening: Patient over age 50, mutual decision to screen reflected in health maintenance.    Pertinent mammograms are reviewed under the imaging tab.  History of abnormal Pap smear:   Last 3 Pap Results:   PAP (no units)   Date Value   10/14/2009 NIL   10/27/2008 ASC-US (A)     PAP / HPV 10/14/2009 10/27/2008   PAP NIL ASC-US(A)     Reviewed and updated as needed this visit by clinical staff  Tobacco  Allergies  Meds  Med Hx  Surg Hx  Fam Hx  Soc Hx        Reviewed and updated as needed this visit by Provider        Past Medical History:   Diagnosis Date     Acromioclavicular joint arthritis 1/25/2017     Acute bronchospasm 8/15/2016     Acute gouty arthritis 8/4/2014     Anxiety state, unspecified      Bipolar I disorder, most recent episode (or current) unspecified      Closed fracture of metatarsal bone 6/5/2007     Depressive disorder, not elsewhere classified      DVT, lower extremity (H)      Headache 10/17/2014     Impingement syndrome of shoulder region 1/25/2017     Polycythemia, secondary     [facility]      Right shoulder pain 10/14/2014     Trochanteric bursitis of both hips 1/11/2016      Past Surgical History:   Procedure Laterality Date     BONE MARROW BIOPSY, BONE SPECIMEN, NEEDLE/TROCAR N/A 11/17/2014    Procedure: BIOPSY BONE MARROW;  Surgeon: Hay Aaron MD;  Location: [facility]     D &   10/26/09     "with uterine ablation     ENDOVASCULAR PLACEMENT VASCULAR DEVICE Left     Left leg stent x 3     ESOPHAGOSCOPY, GASTROSCOPY, DUODENOSCOPY (EGD), COMBINED  2014    Procedure: Gastroscopy;  Surgeon: Moris Thomas MD;  Location: [facility]     HYSTERECTOMY, PAP NO LONGER INDICATED  2010     LAPAROSCOPIC CHOLECYSTECTOMY N/A 2019    Procedure: Laparoscopic cholecystectomy;  Surgeon: Aaron Siegel MD;  Location: [facility]     LITHOTRIPSY      Lithotrypsy     OB History    Para Term  AB Living   4 4 4 0 0 4   SAB TAB Ectopic Multiple Live Births   0 0 0 0 0      # Outcome Date GA Lbr Nito/2nd Weight Sex Delivery Anes PTL Lv   4 Term            3 Term            2 Term            1 Term               Obstetric Comments    84, 85, 87, 92       ROS:  CONSTITUTIONAL: NEGATIVE for fever, chills, change in weight  INTEGUMENTARY/SKIN: NEGATIVE for worrisome rashes, moles or lesions  EYES: NEGATIVE for vision changes or irritation  ENT: NEGATIVE for ear, mouth and throat problems  RESP: NEGATIVE for significant cough or SOB  BREAST: NEGATIVE for masses, tenderness or discharge  CV: NEGATIVE for chest pain, palpitations or peripheral edema  GI: NEGATIVE for nausea, abdominal pain, heartburn, or change in bowel habits  : NEGATIVE for unusual urinary or vaginal symptoms. No vaginal bleeding.  MUSCULOSKELETAL: NEGATIVE for significant arthralgias or myalgia  NEURO: NEGATIVE for weakness, dizziness or paresthesias  PSYCHIATRIC: NEGATIVE for changes in mood or affect     OBJECTIVE:   /64   Pulse 74   Temp 98.4  F (36.9  C) (Tympanic)   Resp 16   Ht 1.473 m (4' 10\")   Wt 105.1 kg (231 lb 12.8 oz)   LMP 2009   SpO2 95%   BMI 48.45 kg/m    EXAM:  GENERAL APPEARANCE: healthy, alert and no distress  EYES: Eyes grossly normal to inspection, PERRL and conjunctivae and sclerae normal  HENT: ear canals and TM's normal, nose and mouth without ulcers or lesions, oropharynx clear and oral mucous " "membranes moist  NECK: no adenopathy, no asymmetry, masses, or scars and thyroid normal to palpation  RESP: lungs clear to auscultation - no rales, rhonchi or wheezes  BREAST: normal without masses, tenderness or nipple discharge and no palpable axillary masses or adenopathy  CV: regular rate and rhythm, normal S1 S2, no S3 or S4, no murmur, click or rub, no peripheral edema and peripheral pulses strong  ABDOMEN: soft, nontender, no hepatosplenomegaly, no masses and bowel sounds normal  MS: no musculoskeletal defects are noted and gait is age appropriate without ataxia  SKIN: no suspicious lesions or rashes  NEURO: Normal strength and tone, sensory exam grossly normal, mentation intact and speech normal  PSYCH: mentation appears normal and affect normal/bright    Diagnostic Test Results:  Labs reviewed in Epic    ASSESSMENT/PLAN:   1. Routine general medical examination at a health care facility    - MA Screening Digital Bilateral; Future    2. Type 2 diabetes mellitus without complication, without long-term current use of insulin (H)  Newly diagnosed, saw Dr. Leong 2 days ago, has appointment with diabetes ed and started on metformin    3. COPD exacerbation (H)  Hospitalized and improved    4. Moderate mixed bipolar I disorder (H)  5. Personality disorder, depressive  On medications, stable      COUNSELING:   Reviewed preventive health counseling, as reflected in patient instructions    Estimated body mass index is 48.45 kg/m  as calculated from the following:    Height as of this encounter: 1.473 m (4' 10\").    Weight as of this encounter: 105.1 kg (231 lb 12.8 oz).    Weight management plan: Discussed healthy diet and exercise guidelines     reports that she has been smoking cigarettes. She has a 21.50 pack-year smoking history. She has never used smokeless tobacco.  Tobacco Cessation Action Plan: Information offered: Patient not interested at this time    Counseling Resources:  ATP IV Guidelines  Pooled " Cohorts Equation Calculator  Breast Cancer Risk Calculator  FRAX Risk Assessment  ICSI Preventive Guidelines  Dietary Guidelines for Americans, 2010  [employer]'s MyPlate  ASA Prophylaxis  Lung CA Screening    Gina Summers MD  [facility]

## 2020-01-31 ASSESSMENT — ASTHMA QUESTIONNAIRES: ACT_TOTALSCORE: 13

## 2020-02-04 ENCOUNTER — TELEPHONE (OUTPATIENT)
Dept: FAMILY MEDICINE | Facility: CLINIC | Age: 55
End: 2020-02-04

## 2020-02-04 DIAGNOSIS — I82.409 DVT (DEEP VENOUS THROMBOSIS) (H): ICD-10-CM

## 2020-02-04 DIAGNOSIS — I73.9 PVD (PERIPHERAL VASCULAR DISEASE) WITH CLAUDICATION (H): ICD-10-CM

## 2020-02-04 DIAGNOSIS — Z86.73 HISTORY OF STROKE: ICD-10-CM

## 2020-02-04 DIAGNOSIS — Z79.01 LONG TERM CURRENT USE OF ANTICOAGULANT THERAPY: ICD-10-CM

## 2020-02-04 LAB
HCT VFR BLD AUTO: 53.8 % (ref 35–47)
INR PPP: 1.93 (ref 0.86–1.14)

## 2020-02-04 PROCEDURE — 85610 PROTHROMBIN TIME: CPT | Performed by: FAMILY MEDICINE

## 2020-02-04 PROCEDURE — 85014 HEMATOCRIT: CPT | Performed by: FAMILY MEDICINE

## 2020-02-04 PROCEDURE — 36415 COLL VENOUS BLD VENIPUNCTURE: CPT | Performed by: FAMILY MEDICINE

## 2020-02-04 NOTE — TELEPHONE ENCOUNTER
She just had one on 1/28 . She is followed by Dr. Gallagher and just leave it as is. Montes Cuevas M.D.

## 2020-02-04 NOTE — TELEPHONE ENCOUNTER
Reason for Call:  Other     Detailed comments: Jacquelyn with [facility] is calling re: today's lab of Hemo crit.  She is wondering if we can do a CBC with Diff because of flagged items ?? or leave as is.  Need to completed today.  Please advise    Phone Number Patient can be reached at: Other phone number:  [phone]    Best Time: any    Can we leave a detailed message on this number? Not Applicable    Call taken on 2/4/2020 at 10:14 AM by Alla Yepez

## 2020-02-04 NOTE — TELEPHONE ENCOUNTER
ANTICOAGULATION MANAGEMENT     Patient Name:  Bia Bill  Date:  2020    ASSESSMENT /SUBJECTIVE:      Today's INR result of 1.93 is subtherapeutic. Goal INR of 2.0-3.0      Warfarin dose taken: Warfarin taken as previously instructed    Diet: No new diet changes affecting INR    Medication changes/ interactions: Interaction between recently completed prednisone taper (COPD/bronchitis) and warfarin may be affecting INR. Just finished about 2 days ago    Previous INR: Therapeutic     S/S of bleeding or thromboembolism: No    New injury or illness:  No    Upcoming surgery, procedure or cardioversion:  No    Additional findings: none      PLAN:    Spoke with Bia regarding INR result and instructed:     Warfarin Dosing Instructions: Change your warfarin dose to      10 mg every Sun, Thu; 12.5 mg all other days     Instructed patient to follow up no later than: 1 week  Lab visit scheduled    Education provided: Yes: potential affect of stopping prednisone on INR      Norah verbalizes understanding and agrees to warfarin dosing plan.    Instructed to call the Anticoagulation Clinic for any changes, questions or concerns. (#[phone])        OBJECTIVE:  INR   Date Value Ref Range Status   2020 1.93 (H) 0.86 - 1.14 Final             Anticoagulation Summary  As of 2020    INR goal:   2.0-3.0   TTR:   37.7 % (11.2 mo)   INR used for dosin.93! (2020)   Warfarin maintenance plan:   10 mg (5 mg x 2) every Sun, Thu; 12.5 mg (5 mg x 2.5) all other days   Full warfarin instructions:   10 mg every Sun, Thu; 12.5 mg all other days   Weekly warfarin total:   82.5 mg   Plan last modified:   Jessica Herring, RN (2020)   Next INR check:   2020   Priority:   High   Target end date:   Indefinite    Indications    PVD (peripheral vascular disease) with claudication (H) [I73.9]  Long term current use of anticoagulant therapy (Resolved) [Z79.01]  DVT (deep venous thrombosis) (H)  [I82.409]  History of stroke symptoms  stroke ruled out on imaging [Z86.73]             Anticoagulation Episode Summary     INR check location:       Preferred lab:       Send INR reminders to:   JEROMY SIMPSON [employer] POOL    Comments:   * lab draw due to elevated hematocrit (fingerstick meters don't work). LEFT LE. STENT x 3 LEFT UPPER LEG. Previous arterial clot. AS of 10/9/19 Goal range 2.0-3.0.      Anticoagulation Care Providers     Provider Role Specialty Phone number    Kd Leong MD Responsible Family Practice [phone]

## 2020-02-06 DIAGNOSIS — G63 POLYNEUROPATHY IN OTHER DISEASES CLASSIFIED ELSEWHERE (H): Chronic | ICD-10-CM

## 2020-02-06 NOTE — TELEPHONE ENCOUNTER
GABAPENTIN 600MG TABS      Last Written Prescription Date:  10/2/19  Last Fill Quantity: 90,   # refills: 3  Last Office Visit: 1/29/20  Future Office visit:    Next 5 appointments (look out 90 days)    Feb 13, 2020  8:30 AM CST  Return Visit with Kd Morris  [facility] ([facility]) [address] [phone]  [phone]           Routing refill request to provider for review/approval because:  Drug not on the [facility], [facility] or  Health refill protocol or controlled substance

## 2020-02-06 NOTE — PROGRESS NOTES
Outpatient Physical Therapy Progress Note     Patient: Bia Bill  : 1965    Beginning/End Dates of Reporting Period:  2019 to 2020    Referring Provider: Dr. Leong    Therapy Diagnosis: BLE secondary lymphedema, chronic      Client Self Report: the L leg sometimes weeps    Objective Measurements:  Objective Measure: girth  Details: since last seen on : RLE +1.9% and LLE +4.7%    Objective Measure: skin  Details: 3 scabs noted at LLE anterior leg from recent past weeping     Outcome Measures (most recent score):   LLIS = 25 on date of initial evaluation; pt will again complete LLIS at time of discharge     Goals:  Goal Identifier stg   Goal Description pt to have around the clock tolerance to BLE GCB for edema reduction response   Target Date 12/10/19   Date Met  19   Progress:     Goal Identifier ltg   Goal Description once appropriate, pt to be independent with donning, doffing and care of compression garments for longterm edema management for maintenance   Target Date 20   Date Met  20(friend also helps)   Progress:     Goal Identifier ltg   Goal Description pt to be independent with longterm edema management for maintenance via HEP, elevation, skin cares and compression garment wear/use   Target Date 20   Date Met      Progress:     Goal Identifier ltg   Goal Description pt to have at least 5 point improvement on LLIS due to decreased edema in BLEs   Target Date 20   Date Met      Progress:     Progress Toward Goals:   Patient last seen in clinic on 2020 for what was thought to be anticipated discharge, however, when patient came to that appointment both legs were more edematous (see above girth) as well as some weeping on LLE, so provided education to modify home program and leg cares and instructed to return in a few weeks. Pt unfortunately no-showed to appt on 2020 (often has ride issues due to not driving herself) and has rescheduled  for 2/11/2020.      Plan:  Continue therapy per current plan of care.    Discharge:  No

## 2020-02-10 ENCOUNTER — TELEPHONE (OUTPATIENT)
Dept: FAMILY MEDICINE | Facility: CLINIC | Age: 55
End: 2020-02-10

## 2020-02-10 DIAGNOSIS — E11.9 TYPE 2 DIABETES MELLITUS WITHOUT COMPLICATION, WITHOUT LONG-TERM CURRENT USE OF INSULIN (H): ICD-10-CM

## 2020-02-10 RX ORDER — GABAPENTIN 600 MG/1
TABLET ORAL
Qty: 90 TABLET | Refills: 3 | Status: SHIPPED | OUTPATIENT
Start: 2020-02-10 | End: 2020-06-08

## 2020-02-10 NOTE — TELEPHONE ENCOUNTER
Pt is calling on the following medication and stating she only got a refill for 50 test strips and not available to  until 2/20/'2020.  She states she will need more before that.     Disp Refills Start End ANAMIKA   blood glucose (NO BRAND SPECIFIED) test strip 100 strip 6 1/29/2020  --   Sig: Use to test blood sugar daily or as directed. To accompany: Blood Glucose Monitor Brands: per insurance.   Sent to pharmacy as: blood glucose (NO BRAND SPECIFIED) test strip   Class: E-Prescribe   Order: [id number]     Goes to ERIK Yepez  [facility]

## 2020-02-11 ENCOUNTER — TELEPHONE (OUTPATIENT)
Dept: PHYSICAL THERAPY | Facility: CLINIC | Age: 55
End: 2020-02-11

## 2020-02-11 ENCOUNTER — HOSPITAL ENCOUNTER (OUTPATIENT)
Dept: PHYSICAL THERAPY | Facility: CLINIC | Age: 55
Setting detail: THERAPIES SERIES
End: 2020-02-11
Attending: FAMILY MEDICINE
Payer: COMMERCIAL

## 2020-02-11 ENCOUNTER — TELEPHONE (OUTPATIENT)
Dept: ANTICOAGULATION | Facility: CLINIC | Age: 55
End: 2020-02-11

## 2020-02-11 DIAGNOSIS — Z86.73 HISTORY OF STROKE: ICD-10-CM

## 2020-02-11 DIAGNOSIS — Z79.01 LONG TERM CURRENT USE OF ANTICOAGULANT THERAPY: ICD-10-CM

## 2020-02-11 DIAGNOSIS — I73.9 PVD (PERIPHERAL VASCULAR DISEASE) WITH CLAUDICATION (H): ICD-10-CM

## 2020-02-11 DIAGNOSIS — I82.409 DVT (DEEP VENOUS THROMBOSIS) (H): ICD-10-CM

## 2020-02-11 DIAGNOSIS — E11.9 TYPE 2 DIABETES MELLITUS WITHOUT COMPLICATION, WITHOUT LONG-TERM CURRENT USE OF INSULIN (H): ICD-10-CM

## 2020-02-11 LAB
HCT VFR BLD AUTO: 48.4 % (ref 35–47)
INR PPP: 3.52 (ref 0.86–1.14)

## 2020-02-11 PROCEDURE — 97140 MANUAL THERAPY 1/> REGIONS: CPT | Mod: GP | Performed by: PHYSICAL THERAPIST

## 2020-02-11 PROCEDURE — 85610 PROTHROMBIN TIME: CPT | Performed by: FAMILY MEDICINE

## 2020-02-11 PROCEDURE — 36415 COLL VENOUS BLD VENIPUNCTURE: CPT | Performed by: FAMILY MEDICINE

## 2020-02-11 PROCEDURE — 85014 HEMATOCRIT: CPT | Performed by: FAMILY MEDICINE

## 2020-02-11 NOTE — TELEPHONE ENCOUNTER
Dr. Leong,  I am entering a DME order into Epic for new compression garments for Norah if you could please sign-off at your earliest convenience.  Thank you!  Joseline Santana, PT, DPT, CLT

## 2020-02-11 NOTE — TELEPHONE ENCOUNTER
[facility] calling for new script for Bia.  Apparently she has 1 test strip left and has been testing 2-3 times daily.  Insurance will not cover until the 20th unless new script is submitted stating testing 2-3 times daily.      Please review and send new script if appropriate.  Thank you..Lizzette Mcintosh

## 2020-02-11 NOTE — TELEPHONE ENCOUNTER
ANTICOAGULATION MANAGEMENT     Patient Name:  Bia Bill  Date:  2/11/2020    ASSESSMENT /SUBJECTIVE:      Today's INR result of 3.52 is supratherapeutic. Goal INR of 2.0-3.0      Warfarin dose taken: Warfarin taken as previously instructed    Diet: No new diet changes affecting INR    Medication changes/ interactions: No new medications/supplements affecting INR    Previous INR: Subtherapeutic     S/S of bleeding or thromboembolism: No    New injury or illness:  No    Upcoming surgery, procedure or cardioversion:  No    Additional findings: Extra doses given last week.      PLAN:    Spoke with Norah regarding INR result and instructed:     Warfarin Dosing Instructions: Change your warfarin dose to 10 mg Sun/Tues/Thur; and 12.5 mg ROW    Instructed patient to follow up no later than: 10 days  Patient offered & declined to schedule next visit    Education provided: Yes: Patient denies signs or symptoms of bleeding. Writer educated patient regarding increased bleed risk and when to seek immediate medical attention. Patient verbalized understanding.        Norah verbalizes understanding and agrees to warfarin dosing plan.    Instructed to call the Anticoagulation Clinic for any changes, questions or concerns. (#[phone])        OBJECTIVE:  INR   Date Value Ref Range Status   02/11/2020 3.52 (H) 0.86 - 1.14 Final             Anticoagulation Summary  As of 2/11/2020    INR goal:   2.0-3.0   TTR:   39.0 % (11.2 mo)   INR used for dosing:   3.52! (2/11/2020)   Warfarin maintenance plan:   10 mg (5 mg x 2) every Sun, Tue, Thu; 12.5 mg (5 mg x 2.5) all other days   Full warfarin instructions:   2/11: 10 mg; Otherwise 10 mg every Sun, Tue, Thu; 12.5 mg all other days   Weekly warfarin total:   80 mg   Plan last modified:   Reina Forrest RN (2/11/2020)   Next INR check:   2/21/2020   Priority:   High   Target end date:   Indefinite    Indications    PVD (peripheral vascular disease) with claudication (H)  [I73.9]  Long term current use of anticoagulant therapy (Resolved) [Z79.01]  DVT (deep venous thrombosis) (H) [I82.409]  History of stroke symptoms  stroke ruled out on imaging [Z86.73]             Anticoagulation Episode Summary     INR check location:       Preferred lab:       Send INR reminders to:   [place] PHONE [employer] POOL    Comments:   * lab draw due to elevated hematocrit (fingerstick meters don't work). LEFT LE. STENT x 3 LEFT UPPER LEG. Previous arterial clot. AS of 10/9/19 Goal range 2.0-3.0.      Anticoagulation Care Providers     Provider Role Specialty Phone number    Kd Leong MD Responsible Family Practice [phone]

## 2020-02-11 NOTE — PROGRESS NOTES
Outpatient Physical Therapy Discharge Note     Patient: Bia Bill  : 1965    Beginning/End Dates of Reporting Period:  2020 to 2020    Referring Provider: Dr. Dang MD    Therapy Diagnosis: BLE secondary lymphedema, chronic      Client Self Report: the legs are doing great    Objective Measurements:  Objective Measure: girth  Details: since last session on 2020: RLE -12.1% and LLE -11.7% and since initial evaluation on 2019: RLE -8.2% and LLE -6.7%    Objective Measure: skin  Details: BLE skin dry, but intact throughout; no blisters or weeping      Outcome Measures (most recent score):  Lymphedema Life Impact Scale (score range 0-72). A higher score indicates greater impairment.: 6    Goals:  Goal Identifier stg   Goal Description pt to have around the clock tolerance to BLE GCB for edema reduction response   Target Date 12/10/19   Date Met  19   Progress:     Goal Identifier ltg   Goal Description once appropriate, pt to be independent with donning, doffing and care of compression garments for longterm edema management for maintenance   Target Date 20   Date Met  20(friend also helps)   Progress:     Goal Identifier ltg   Goal Description pt to be independent with longterm edema management for maintenance via HEP, elevation, skin cares and compression garment wear/use   Target Date 20   Date Met  20   Progress:     Goal Identifier ltg   Goal Description pt to have at least 5 point improvement on LLIS due to decreased edema in BLEs   Target Date 20   Date Met  20   Progress:     Progress Toward Goals:   OP lymphedema therapy goals met      Plan:  Discharge from therapy.    Discharge:    Reason for Discharge: Patient has met all goals.    Equipment Issued: no new garments this session, current garments are Compreflex lite size medium/regular length    Discharge Plan: Patient to continue home program.

## 2020-02-12 ENCOUNTER — TELEPHONE (OUTPATIENT)
Dept: FAMILY MEDICINE | Facility: CLINIC | Age: 55
End: 2020-02-12

## 2020-02-12 ENCOUNTER — PATIENT OUTREACH (OUTPATIENT)
Dept: CARE COORDINATION | Facility: CLINIC | Age: 55
End: 2020-02-12

## 2020-02-12 DIAGNOSIS — K21.9 GASTROESOPHAGEAL REFLUX DISEASE WITHOUT ESOPHAGITIS: ICD-10-CM

## 2020-02-12 DIAGNOSIS — E11.9 TYPE 2 DIABETES MELLITUS WITHOUT COMPLICATION, WITHOUT LONG-TERM CURRENT USE OF INSULIN (H): ICD-10-CM

## 2020-02-12 NOTE — PROGRESS NOTES
"Clinic Care Coordination Contact    Follow Up Progress Note      Assessment: Patient calling clinic care coordinator.     Patient states her fasting blood sugars are between 150-190 and during the day when she checks them they are 200 or a little over.  We discussed diet. She states she is not able to eat many salads as she is on coumadin. Discussed what carbs to avoid or to eat minimal amounts.    Patient has an appointment with Diabetic education on 2/21/20. Strongly encouraged to keep this appointment as they are the experts and can help control her blood sugars better    Patient feels like she is more active than in the past. She is not short of breath as we speak on the phone today.     Patient states since she starting taking the metformin her edema in her legs is way down. She states she saw the lymphedema nurse yesterday and she said \"I can see your ankles, I have never seen you ankles before.\" patient states they are less painful so she can get around better.       Goals addressed this encounter:   Goals Addressed                 This Visit's Progress      #1 Medical (pt-stated)   On track     Goal Statement: I want my cellulitis to heal.  Date goal set: 12/5/2019   Measure of Success: Cellulitis will be healed.  Barriers: current infection  Strengths: care coordination, lymphedema therapy  Date to Achieve By: April 2020   Patient expressed understanding of goal: yes    Action steps to achieve this goal  1. I will take and finish my antibiotics as prescribed  2. I will elevate my legs as much as possible for the first 48 hours  3. I will follow up with PCP as scheduled         #2 Medical (pt-stated)   On track     Goal Statement: I want my shortness of breath to improve    Date goal set: 11/1/19    Measure of Success: When I am not short of breath    Barriers: COPD    Strengths: Motivated    Date to Achieve By: April 2020    Patient expressed understanding of goal: Yes    Action steps to achieve this " goal  1. I will get my nebulizer fixed - got new machine 1/2020  2. I will use nebulizer as instructed  3. I will see PCP  as scheduled  4. I will use my asthma action plan         #3 Improve chronic symptoms (pt-stated)   60%     Goal Statement: I want my edema to improve.  Date goal set: 12/5/2019   Measure of Success: I will be able to put support stockings on by myself.  Barriers: lymphedema  Strengths: care coordination, lymphedema clinic  Date to Achieve By: 3/1/20  Patient expressed understanding of goal: yes    Action steps to achieve this goal  1. I will elevate my legs throughout the day  2. I will attend lymphedema clinic appointments  3. I will take my diuretics as prescribed         #4 Monitoring (pt-stated)   60%     Goal Statement: I will weigh myself every day.  Date goal set: 12/5/2019   Measure of Success: Consistent record of daily weights.  Barriers: Does not like scale  Strengths: care coordination support  Date to Achieve By: April 2020  Patient expressed understanding of goal: yes    Action steps to achieve this goal  1. I will weigh myself every morning.  2. I will write my weight down in a notebook.  3. I will take an extra 20-40 mg of Lasix if my weight increases by 3 lbs.         Intervention/Education provided during outreach: Discussed low carb diet.   Verified she is taking metformin BID  Elevate feet when sitting.      Outreach Frequency: monthly    Plan:   Patient will see diabetic educator as scheduled.   Patient will continue lymphedema treatment.    Care Coordinator will follow up in one month.    Michela Whitaker RN, [facility] RN Coordinator  [facility]   2/12/2020    1:59 PM  [phone]

## 2020-02-12 NOTE — TELEPHONE ENCOUNTER
"OMEPRAZOLE 20MG CPDR      Last Written Prescription Date:  11/26/19  Last Fill Quantity: 180,   # refills: 0  Last Office Visit: 1/30/20  Future Office visit:    Next 5 appointments (look out 90 days)    Feb 13, 2020  8:30 AM CST  Return Visit with Kd Morris  [facility] ([facility]) [address] [address] [phone]           Requested Prescriptions   Pending Prescriptions Disp Refills     omeprazole (PRILOSEC) 20 MG DR capsule [Pharmacy Med Name: OMEPRAZOLE 20MG CPDR] 180 capsule 0     Sig: TAKE ONE CAPSULE BY MOUTH TWICE A DAY       PPI Protocol Passed - 2/12/2020  5:01 AM        Passed - Not on Clopidogrel (unless Pantoprazole ordered)        Passed - No diagnosis of osteoporosis on record        Passed - Recent (12 mo) or future (30 days) visit within the authorizing provider's specialty     Patient has had an office visit with the authorizing provider or a provider within the authorizing providers department within the previous 12 mos or has a future within next 30 days. See \"Patient Info\" tab in inbasket, or \"Choose Columns\" in Meds & Orders section of the refill encounter.              Passed - Medication is active on med list        Passed - Patient is age 18 or older        Passed - No active pregnacy on record        Passed - No positive pregnancy test in past 12 months          "

## 2020-02-12 NOTE — TELEPHONE ENCOUNTER
Patient notified, patient would like to use her old script of 500 mg Metformin up first, so patient will take 2 of the 500 mg Metformin twice a day. Patient verbalizes understanding and read back the information.    TAWANA Dawson

## 2020-02-12 NOTE — TELEPHONE ENCOUNTER
Patient confirms she takes as directed, takes one capsule of Omeprazole twice a day. Had another question about Metformin but this was routed to PCP for review, medication sent.    Script sent but needs medication review, reminder placed on pharmacy notes.    TAWANA Dawson

## 2020-02-12 NOTE — TELEPHONE ENCOUNTER
Sig says patient is taking differently. How is patient taking this? Is she out of medication? Needs appointment as well.    Left message to call back.    TAWANA Dawson

## 2020-02-12 NOTE — TELEPHONE ENCOUNTER
Dr. Leong,    Patient called back in response to a question regarding her Omeprazole refill and then proceeded to notify that her blood sugars have been running in the 200's last few days, reports that yesterday she was dizzy and felt like faint, today she is not dizzy, not feeling faint, however she is experiencing some vision changes and says she can not focus well, is thirsty and tired, not confused.    Patient reports her blood sugars are the followin2020: 251 at 6:30 pm, 246 at 8 pm, and 285 at 9 pm  2-: 236 at 7:44 pm  2020: 218 at 6:30 am, 240 at 8:50 pm  2020: 187 at 8am.    Patient is new diabetic, has [place] appointment on 2020, wondering if she should adjust medication, says has been watching what she eats. Routing to PCP to advise.    TAWANA Dawson

## 2020-02-13 ENCOUNTER — OFFICE VISIT (OUTPATIENT)
Dept: PSYCHOLOGY | Facility: CLINIC | Age: 55
End: 2020-02-13
Payer: COMMERCIAL

## 2020-02-13 DIAGNOSIS — F31.62 MODERATE MIXED BIPOLAR I DISORDER (H): Primary | ICD-10-CM

## 2020-02-13 DIAGNOSIS — E03.9 HYPOTHYROIDISM, UNSPECIFIED TYPE: Chronic | ICD-10-CM

## 2020-02-13 PROCEDURE — 90834 PSYTX W PT 45 MINUTES: CPT | Performed by: PSYCHOLOGIST

## 2020-02-13 RX ORDER — LEVOTHYROXINE SODIUM 175 UG/1
TABLET ORAL
Qty: 90 TABLET | Refills: 0 | Status: SHIPPED | OUTPATIENT
Start: 2020-02-13 | End: 2020-05-28

## 2020-02-13 NOTE — TELEPHONE ENCOUNTER
Future Office visit:    Next 5 appointments (look out 90 days)    Feb 28, 2020  8:30 AM CST  Return Visit with [facility] ([facility]) [address] [address]  [phone]   Mar 13, 2020  2:00 PM CDT  Return Visit with [facility] ([facility]) [address] [address]  [phone]           Routing refill request to provider for review/approval because:  TSH is out of range.  Aubree Richardson RN

## 2020-02-13 NOTE — TELEPHONE ENCOUNTER
"LEVOTHYROXINE SODIUM 175MCG TABS      Last Written Prescription Date:  11/29/19  Last Fill Quantity: 90,   # refills: 0  Last Office Visit: 1/30/20  Future Office visit:    Next 5 appointments (look out 90 days)    Feb 28, 2020  8:30 AM CST  Return Visit with Kd Morris  [facility] ([facility]) [address] [address]  [phone]   Mar 13, 2020  2:00 PM CDT  Return Visit with Kd Morris  [facility] ([facility]) [address] [address]  [phone]           Requested Prescriptions   Pending Prescriptions Disp Refills     levothyroxine (SYNTHROID/LEVOTHROID) 175 MCG tablet [Pharmacy Med Name: LEVOTHYROXINE SODIUM 175MCG TABS] 90 tablet 0     Sig: TAKE ONE TABLET BY MOUTH ONCE DAILY       Thyroid Protocol Failed - 2/13/2020 10:17 AM        Failed - Normal TSH on file in past 12 months     Recent Labs   Lab Test 12/02/19  1003   TSH 4.19*              Passed - Patient is 12 years or older        Passed - Recent (12 mo) or future (30 days) visit within the authorizing provider's specialty     Patient has had an office visit with the authorizing provider or a provider within the authorizing providers department within the previous 12 mos or has a future within next 30 days. See \"Patient Info\" tab in inbasket, or \"Choose Columns\" in Meds & Orders section of the refill encounter.              Passed - Medication is active on med list        Passed - No active pregnancy on record     If patient is pregnant or has had a positive pregnancy test, please check TSH.          Passed - No positive pregnancy test in past 12 months     If patient is pregnant or has had a positive pregnancy test, please check TSH.            "

## 2020-02-16 DIAGNOSIS — F33.1 MAJOR DEPRESSIVE DISORDER, RECURRENT EPISODE, MODERATE (H): ICD-10-CM

## 2020-02-17 RX ORDER — ARIPIPRAZOLE 10 MG/1
TABLET ORAL
Qty: 90 TABLET | Refills: 1 | Status: SHIPPED | OUTPATIENT
Start: 2020-02-17 | End: 2020-08-03

## 2020-02-17 NOTE — TELEPHONE ENCOUNTER
ARIPIPRAZOLE 10MG TABS      Last Written Prescription Date:  8/28/19  Last Fill Quantity: 90,   # refills: 1  Last Office Visit: 1/30/20  Future Office visit:    Next 5 appointments (look out 90 days)    Feb 28, 2020  8:30 AM CST  Return Visit with Kd Morris  [facility] ([facility]) [address] [address]  [phone]   Mar 13, 2020  2:00 PM CDT  Return Visit with Kd CHAWLA [facility] ([facility]) [address] [address]  [phone]           Requested Prescriptions   Pending Prescriptions Disp Refills     ARIPiprazole (ABILIFY) 10 MG tablet [Pharmacy Med Name: ARIPIPRAZOLE 10MG TABS] 90 tablet 1     Sig: TAKE ONE TABLET BY MOUTH ONCE DAILY       Antipsychotic Medications Passed - 2/16/2020  5:02 AM        Passed - Blood pressure under 140/90 in past 12 months     BP Readings from Last 3 Encounters:   01/30/20 122/64   01/29/20 139/63   01/23/20 (!) 143/60                 Passed - Patient is 12 years of age or older        Passed - Lipid panel on file within the past 12 months     Recent Labs   Lab Test 09/27/19  1309  09/10/15  1332   CHOL 114   < > 135   TRIG 173*   < > 128   HDL 31*   < > 27*   LDL 48   < > 82   NHDL 83   < >  --    VLDL  --   --  26   CHOLHDLRATIO  --   --  5.0    < > = values in this interval not displayed.               Passed - CBC on file in past 12 months     Recent Labs   Lab Test 02/11/20  1050  01/28/20  1305   WBC  --   --  7.0   RBC  --   --  5.84*   HGB  --   --  14.4   HCT 48.4*   < > 50.7*   PLT  --   --  116*    < > = values in this interval not displayed.                 Passed - Heart Rate on file within past 12 months     Pulse Readings from Last 3 Encounters:   01/30/20 74   01/29/20 76   01/23/20 88               Passed - A1c or Glucose on file in past 12 months     Recent Labs   Lab Test 01/23/20  0444   *   A1C 7.2*       Please review patients last 3  "weights. If a weight gain of >10 lbs exists, you may refill the prescription once after instructing the patient to schedule an appointment within the next 30 days.    Wt Readings from Last 3 Encounters:   01/30/20 105.1 kg (231 lb 12.8 oz)   01/29/20 106.2 kg (234 lb 3.2 oz)   01/23/20 109.9 kg (242 lb 4.6 oz)             Passed - Medication is active on med list        Passed - Patient is not pregnant        Passed - No positve pregnancy test on file in past 12 months        Passed - Recent (6 mo) or future (30 days) visit within the authorizing provider's specialty     Patient had office visit in the last 6 months or has a visit in the next 30 days with authorizing provider or within the authorizing provider's specialty.  See \"Patient Info\" tab in inbasket, or \"Choose Columns\" in Meds & Orders section of the refill encounter.              "

## 2020-02-17 NOTE — TELEPHONE ENCOUNTER
Prescription approved per [facility] Protocol.  Princess Atkins RN

## 2020-02-18 ASSESSMENT — ANXIETY QUESTIONNAIRES
GAD7 TOTAL SCORE: 7
1. FEELING NERVOUS, ANXIOUS, OR ON EDGE: MORE THAN HALF THE DAYS
5. BEING SO RESTLESS THAT IT IS HARD TO SIT STILL: SEVERAL DAYS
6. BECOMING EASILY ANNOYED OR IRRITABLE: MORE THAN HALF THE DAYS
4. TROUBLE RELAXING: SEVERAL DAYS
7. FEELING AFRAID AS IF SOMETHING AWFUL MIGHT HAPPEN: NOT AT ALL
3. WORRYING TOO MUCH ABOUT DIFFERENT THINGS: NOT AT ALL
2. NOT BEING ABLE TO STOP OR CONTROL WORRYING: SEVERAL DAYS

## 2020-02-18 ASSESSMENT — PATIENT HEALTH QUESTIONNAIRE - PHQ9: SUM OF ALL RESPONSES TO PHQ QUESTIONS 1-9: 9

## 2020-02-18 NOTE — PROGRESS NOTES
Progress Note  Disclaimer: This note consists of symbols derived from keyboarding, dictation and/or voice recognition software. As a result, there may be errors in the script that have gone undetected. Please consider this when interpreting information found in this chart.    Client Name: Bia Bill  Date: 12/2/13/2020         Service Type: Individual      Session Start Time: 8:30 AM   session End Time 9:15 AM   session Length: 45     Session #: 51     Attendees: Client attended alone    Treatment Plan Last Reviewed: 11/6/2019  PHQ-9 / CHIN-7 : See flowsheet     DATA       Progress Since Last Session (Related to Symptoms / Goals / Homework):   Symptoms: Stable    Homework: Achieved / completed to satisfaction      Episode of Care Goals: Satisfactory progress - ACTION (Actively working towards change); Intervened by reinforcing change plan / affirming steps taken     Current / Ongoing Stressors and Concerns:  Client reports she was hospitalized for bronchitis last month and while she was here was diagnosed with diabetes.  There is a strong family history of this.  Spent most of the session helping her process this change in her medical status.  Client has not had diabetic education yet and does not know what she can eat or how to deal with multiple issues.     Treatment Objective(s) Addressed in This Session:   Increase interest, engagement, and pleasure in doing things  Decrease frequency and intensity of feeling down, depressed, hopeless        Intervention:   CBT: Behavioral activation  processed significant life change, encouraged client to attend diabetic education  ASSESSMENT: Current Emotional / Mental Status (status of significant symptoms):   Risk status (Self / Other harm or suicidal ideation)   Client denies current fears or concerns for personal safety.   Client denies current or recent suicidal ideation or behaviors.   Client denies current or recent  homicidal ideation or behaviors.   Client denies current or recent self injurious behavior or ideation.   Client denies other safety concerns.   Recommended that patient call 911 or go to the local ED should there be a change in any of these risk factors.     Appearance:   Appropriate    Eye Contact:   Good    Psychomotor Behavior: Normal    Attitude:   Cooperative    Orientation:   All   Speech    Rate / Production: Normal     Volume:  Normal    Mood:    Normal   Affect:    Appropriate    Thought Content:  Clear    Thought Form:  Coherent  Logical    Insight:    Good      Medication Review:   No changes to current psychiatric medication(s)     Medication Compliance:   Yes     Changes in Health Issues:   None reported     Chemical Use Review:   Substance Use: Chemical use reviewed, no active concerns identified      Tobacco Use: Client reports she is down to 1 cigarette per day from 2 packs/day.  Diagnosis:  1. Moderate mixed bipolar I disorder (H)         Collateral Reports Completed:   Not Applicable    PLAN: (Client Tasks / Therapist Tasks / Other)  Client to continue With her self-care routine and maintenance of boundaries.   Client will Identify 1-2Creative activities or hobbies  and engage in them 2 times per week    Kd Morris                                                         ________________________________________________________________________    Treatment Plan    Client's Name: Bia Bill  YOB: 1965    Date: 11/6/2019      DSM5 Diagnoses: (Sustained by DSM5 Criteria Listed Above)  Diagnoses: 296.40 Bipolar I Disorder Current or Most Recent Episode Manic, Unspecified  Psychosocial & Contextual Factors: financial difficulties, chronic pain, roommate issues  WHODAS 2.0 (12 item)               This questionnaire asks about difficulties due to health conditions. Health conditions             include disease or illnesses, other health problems that may be short or long lasting,              injuries, mental health or emotional problems, and problems with alcohol or drugs.                         Think back over the past 30 days and answer these questions, thinking about how much             difficulty you had doing the following activities. For each question, please [place] only             one response.      S1  Standing for long periods such as 30 minutes?  Mild =           2    S2  Taking care of household responsibilities?  Moderate =   3    S3  Learning a new task, for example, learning how to get to a new place?  Mild =           2    S4  How much of a problem do you have joining community activities (for example, festivals, [employer] or other activities) in the same way as anyone else can?  Moderate =   3    S5  How much have you been emotionally affected by your health problems?  Mild =           2        In the past 30 days, how much difficulty did you have in:    S6  Concentrating on doing something for ten minutes?  Mild =           2    S7  Walking a long distance such as a kilometer (or equivalent)?  Severe =       4    S8  Washing your whole body?  None =         1    S9  Getting dressed?  None =         1    S10  Dealing with people you do not know?  Moderate =   3    S11  Maintaining a friendship?  Severe =       4    S12  Your day to day work?  Moderate =   3       H1  Overall, in the past 30 days, how many days were these difficulties present?  Record number of days seven    H2  In the past 30 days, for how many days were you totally unable to carry out your usual activities or work because of any health condition?  Record number of days  seven    H3  In the past 30 days, not counting the days that you were totally unable, for how many days did you cut back or reduce your usual activities or work because of any health condition?  Record number of days five                   Referral / Collaboration:  Referral to another professional/service is not indicated at this  time..    Anticipated number of session or this episode of care: 60    Goals  1. Education- the Biopsychosocial model of depression  a. Client will be able to describe how depression is effecting them physically, emotionally and socially  2. Behavioral Activation  a. Client will learn to assess their depression on a day to day basis  b. Client will Identify two forms of exercise/activity and engage in them 3 times per week  c. Client will Identify 3 things that make them laugh, and engage in these 5 times per week.  d. Client will Identify 1-2Creative activities or hobbies  and engage in them 2 times per week  e. Client will identify music, movies, books that make them feel good and use them 3-4 times per week  3. Self-care  a. Client will identify 5 things they can do just for themselves  b. Client will take time for quiet, reflection, meditation 5 times per week  c. Client will Learn to set boundaries when appropriate  d. Client will Identify 2 individuals they can call on for support, distraction  4. Assessment of progress  a. Client will engage in assessment of their progress on a regular basis    Bipolar Disorder  Treatment plan:  1. Education- the Biopsychosocial model of Bipolar Disorder    a. Client will be able to describe in general terms what Bipolar Disorder  is and is not  b. Client will be able to describe how Bipolar Disorder  has affected their life in at least two different areas, such as school or work and Home/relationships  c. Clients parents/guardians or significant others will be provided information on what Bipolar Disorder  is and the ways it can affect relationships and be encouraged to be a part of clients treatment team.  2. Medication  a. Client will participate in medication evaluation for Bipolar Disorder  symptoms and follow medication recommendations.   3. Identification and management of triggers  a. Client and therapist will examine patient s history to determine if there are  predictable triggers for manic or depressive episodes (e.g. boredom, anger, family stress)  b. Client and therapist will develop means of diffusing these triggers (e.g. relaxation strategies, boundary setting, anger management)  4. Comorbid conditions   a. Client and therapist will asses for comorbid conditions (e.g. anxiety, depression, substance use). and add additional items to treatment plan as needed  5. Self-care  a. Client will identify 3 things they can do just for themselves  b. Client will take time for quiet, reflection, meditation 3 times per week  c. Client will Learn to set boundaries when appropriate  d. Client will Identify 2 individuals they can call on for support, distraction  5. Behavioral Activation  a. Client will Identify two forms of exercise/activity and engage in them 3 times per week  b. Client will Identify 2 things that make them laugh, and engage in these 3 times per week.  c. Client will Identify 2 Creative activities or hobbies  and engage in them 2 times per week  d. Client will identify music, movies, books that make them feel good and use them 3 times per week  6. Assessment of progress  a. Client will engage in assessment of their progress on a regular basis    Client has reviewed and agreed to the above plan.      Kd Morris  4/3/2018

## 2020-02-19 ENCOUNTER — TELEPHONE (OUTPATIENT)
Dept: FAMILY MEDICINE | Facility: CLINIC | Age: 55
End: 2020-02-19

## 2020-02-19 ASSESSMENT — ANXIETY QUESTIONNAIRES: GAD7 TOTAL SCORE: 7

## 2020-02-19 NOTE — TELEPHONE ENCOUNTER
Please see note below.  LOV 1/30/20    Please advise.    Routing to provider.    Roxana KAUFFMAN Rn

## 2020-02-19 NOTE — TELEPHONE ENCOUNTER
Reason for Call:  Appointment Issue    Detailed comments: Amol @ [facility] calling.  Pt saw Dr. Leong 1/29 and then Dr. Summers in [facility] 1/[facility].  Pt would like to see Dr. Summers as her PCP, but told Amol that Dr. Summers doesn't want to be her PCP?  Amol wants to know if it could be because Dr. Summers does not prescribe mental health meds?  Please call pt and/or Amol and advise.      Phone Number Amolcan be reached at: Other phone number:  [phone]    Best Time: any    Can we leave a detailed message on this number? YES    Call taken on 2/19/2020 at 10:06 AM by Bia Lassiter

## 2020-02-19 NOTE — LETTER
March 9, 2020      Bia Bill  [address]  [place] [address]        Dear Bia,       We have been trying to reach you by phone and have been unsuccessful. Please follow up with psychiatry regarding your psych medications. I will see you for any other medical issues.     Sincerely,        Gina Summers MD

## 2020-02-20 NOTE — TELEPHONE ENCOUNTER
She is taking 5 psych meds and I told her she will need psychiatrist to manage these meds.  I can be PCP for her other medical issues.

## 2020-02-20 NOTE — TELEPHONE ENCOUNTER
Message left for patient to return call to clinic.  CSS - ok to deliver message below.    Sharmila KAUFFMAN RN

## 2020-02-21 ENCOUNTER — ALLIED HEALTH/NURSE VISIT (OUTPATIENT)
Dept: EDUCATION SERVICES | Facility: CLINIC | Age: 55
End: 2020-02-21
Payer: COMMERCIAL

## 2020-02-21 DIAGNOSIS — E11.9 TYPE 2 DIABETES MELLITUS (H): Primary | ICD-10-CM

## 2020-02-21 PROCEDURE — G0108 DIAB MANAGE TRN  PER INDIV: HCPCS

## 2020-02-21 NOTE — PROGRESS NOTES
"Diabetes Self-Management Education & Support    Presents for: Individual review    SUBJECTIVE/OBJECTIVE:  Presents for: Individual review  Accompanied by: Self  Diabetes education in the past 24mo: No  Focus of Visit: Diabetes Pathophysiology, Taking Medication  Diabetes type: Type 2  Date of diagnosis: 1/23/2020  Disease course: Improving  Diabetes management related comments/concerns: Reports her sister and mom have type 1 and are on insulin   Transportation concerns: No  Other concerns:: None  Cultural Influences/Ethnic Background:  American    Diabetes Symptoms & Complications:  No signs/symptoms       Patient Problem List and Family Medical History reviewed for relevant medical history, current medical status, and diabetes risk factors.    Vitals:  LMP 09/27/2009   Estimated body mass index is 48.45 kg/m  as calculated from the following:    Height as of 1/30/20: 1.473 m (4' 10\").    Weight as of 1/30/20: 105.1 kg (231 lb 12.8 oz).   Last 3 BP:   BP Readings from Last 3 Encounters:   01/30/20 122/64   01/29/20 139/63   01/23/20 (!) 143/60       History   Smoking Status     Current Every Day Smoker     Packs/day: 0.50     Years: 43.00     Types: Cigarettes   Smokeless Tobacco     Never Used     Comment: started at age 11.  Quit during pregnancyX4.  Quit 3 months ago.        Labs:  Lab Results   Component Value Date    A1C 7.2 01/23/2020     Lab Results   Component Value Date     01/23/2020     Lab Results   Component Value Date    LDL 48 09/27/2019     HDL Cholesterol   Date Value Ref Range Status   09/27/2019 31 (L) >49 mg/dL Final   ]  GFR Estimate   Date Value Ref Range Status   01/23/2020 >90 >60 mL/min/[1.73_m2] Final     Comment:     Non  GFR Calc  Starting 12/18/2018, serum creatinine based estimated GFR (eGFR) will be   calculated using the Chronic Kidney Disease Epidemiology Collaboration   (CKD-EPI) equation.       GFR Estimate If Black   Date Value Ref Range Status   01/23/2020 " >90 >60 mL/min/[1.73_m2] Final     Comment:      GFR Calc  Starting 12/18/2018, serum creatinine based estimated GFR (eGFR) will be   calculated using the Chronic Kidney Disease Epidemiology Collaboration   (CKD-EPI) equation.       Lab Results   Component Value Date    CR 0.66 01/23/2020     No results found for: MICROALBUMIN    Healthy Eating:  Healthy Eating Assessed Today: Yes  Meals include: Breakfast, Lunch, Dinner  Breakfast: switched from white to wheat bread.  Quit sugar in coffee with cream   Lunch: soup - chicken and beef.  Sister makes the soup with lots of vegetables (just started)   Dinner: hamburger and rice   Other: was drinking a 12 pack of regular pepsi per day.    Drinking water now.     Being Active:  Being Active Assessed Today: Yes  Exercise:: Yes  How intense was your typical exercise? : Moderate (like brisk walking)    Monitoring:  Monitoring Assessed Today: Yes  Blood Glucose Meter: Accu-chek            Taking Medications:  Diabetes Medication(s)     Biguanides       metFORMIN (GLUCOPHAGE) 1000 MG tablet    Take 1 tablet (1,000 mg) by mouth 2 times daily (with meals)          Taking Medication Assessed Today: Yes  Current Treatments: Oral Medication (taken by mouth)  Problems taking diabetes medications regularly?: No  Diabetes medication side effects?: No    Problem Solving:  Problem Solving Assessed Today: No    Reducing Risks:  Reducing Risks Assessed Today: No    Healthy Coping:  Healthy Coping Assessed Today: Yes  Emotional response to diabetes: Ready to learn  Stage of change: ACTION (Actively working towards change)  Support resources: In-person Offerings, Websites  Patient Activation Measure Survey Score:  LEONA Score (Last Two) 1/12/2011   LEONA Raw Score 45   Activation Score 73.1   LEONA Level 4     Diabetes knowledge and skills assessment:   Patient is knowledgeable in diabetes management concepts related to: Healthy Eating, Being Active, Monitoring and Taking  Medication  Patient needs further education on the following diabetes management concepts: Problem Solving, Reducing Risks and Healthy Coping  Based on learning assessment above, most appropriate setting for further diabetes education would be: Individual setting.    INTERVENTIONS:    Education provided today on:  AADE Self-Care Behaviors:  Diabetes Pathophysiology  Healthy Eating: carbohydrate counting, consistency in amount, composition, and timing of food intake, eating out, portion control and plate planning method, label reading  Being Active: relationship to blood glucose  Monitoring: proper technique, log and interpret results, individual blood glucose targets and frequency of monitoring  Taking Medication: action of prescribed medication    Opportunities for ongoing education and support in diabetes-self management were discussed.  Pt verbalized understanding of concepts discussed and recommendations provided today.       Education Materials Provided:  [employer] Healthy Living with Diabetes Book, BG Log Sheet and Carbohydrate Counting      ASSESSMENT:  New diagnosis.  Metformin increased (blood sugars decreased over feb with increase in Metformin).      Patient's most recent   Lab Results   Component Value Date    A1C 7.2 01/23/2020    is not meeting goal of <7.0    PLAN  See Patient Instructions for co-developed, patient-stated behavior change goals.  AVS printed and provided to patient today. See Follow-Up section for recommended follow-up.    Follow up 4/17 to finish AADE7    Bia Waters RD, LD, CDE  Diabetes Education    Time Spent: 60 minutes  Encounter Type: Individual

## 2020-02-21 NOTE — PATIENT INSTRUCTIONS
Keep up the great work!!!    Walking / moving around lowers blood sugars like a medication - dog walking is wonderful.     Diabetes Support Resources:  Websites: American Association of Diabetes Educators Participant Resources: www.diabeteseducator.org/living-with-diabetes   American Diabetes Association: www.diabetes.org  Diabetes Together 2 Goal: http://rdejipzt2kdhb.org     Bring blood glucose meter and logbook with you to all doctor and follow-up appointments.      [place] [facility] Education [facility] Nutrition Services:  For Your Diabetes Education and Nutrition Appointments Call:  [phone]   For Diabetes Education or Nutrition Related Questions:   Phone: [phone]  E-mail: [email or web address]  Fax: [phone]   If you need a medication refill please contact your pharmacy. Please allow 3 business days for your refills to be completed.

## 2020-02-26 DIAGNOSIS — J44.9 CHRONIC OBSTRUCTIVE PULMONARY DISEASE, UNSPECIFIED COPD TYPE (H): Chronic | ICD-10-CM

## 2020-02-26 RX ORDER — FLUTICASONE PROPIONATE AND SALMETEROL XINAFOATE 230; 21 UG/1; UG/1
AEROSOL, METERED RESPIRATORY (INHALATION)
Qty: 12 G | Refills: 3 | Status: SHIPPED | OUTPATIENT
Start: 2020-02-26 | End: 2021-03-12

## 2020-02-26 NOTE — TELEPHONE ENCOUNTER
"ADVAIR -21MCG/ACT AERO      Last Written Prescription Date:  11/20/19  Last Fill Quantity: 1,   # refills: 3  Last Office Visit: 1/30/20  Future Office visit:    Next 5 appointments (look out 90 days)    Feb 28, 2020  8:30 AM CST  Return Visit with Kd CAMMY [facility] ([facility] [address] [address]  [phone]   Mar 13, 2020  2:00 PM CDT  Return Visit with Kd CHAWLA [facility] ([facility]) [address] [address]  [phone]           Requested Prescriptions   Pending Prescriptions Disp Refills     ADVAIR -21 MCG/ACT inhaler [Pharmacy Med Name: ADVAIR -21MCG/ACT AERO] 12 g 3     Sig: INHALE TWO PUFFS BY MOUTH TWICE A DAY       Inhaled Steroids Protocol Failed - 2/26/2020  5:02 AM        Failed - Asthma control assessment score within normal limits in last 6 months     Please review ACT score.           Passed - Patient is age 12 or older        Passed - Medication is active on med list        Passed - Recent (6 mo) or future (30 days) visit within the authorizing provider's specialty     Patient had office visit in the last 6 months or has a visit in the next 30 days with authorizing provider or within the authorizing provider's specialty.  See \"Patient Info\" tab in inbasket, or \"Choose Columns\" in Meds & Orders section of the refill encounter.              "

## 2020-02-28 ENCOUNTER — TELEPHONE (OUTPATIENT)
Dept: FAMILY MEDICINE | Facility: CLINIC | Age: 55
End: 2020-02-28

## 2020-02-28 ENCOUNTER — OFFICE VISIT (OUTPATIENT)
Dept: PSYCHOLOGY | Facility: CLINIC | Age: 55
End: 2020-02-28
Payer: COMMERCIAL

## 2020-02-28 DIAGNOSIS — Z86.73 HISTORY OF STROKE: ICD-10-CM

## 2020-02-28 DIAGNOSIS — I82.409 DEEP PHLEBOTHROMBOSIS, ANTEPARTUM, WITH DELIVERY (H): Primary | ICD-10-CM

## 2020-02-28 DIAGNOSIS — F31.62 MODERATE MIXED BIPOLAR I DISORDER (H): Primary | ICD-10-CM

## 2020-02-28 DIAGNOSIS — I73.9 PVD (PERIPHERAL VASCULAR DISEASE) WITH CLAUDICATION (H): ICD-10-CM

## 2020-02-28 DIAGNOSIS — I82.409 DVT (DEEP VENOUS THROMBOSIS) (H): ICD-10-CM

## 2020-02-28 DIAGNOSIS — O22.30 DEEP PHLEBOTHROMBOSIS, ANTEPARTUM, WITH DELIVERY (H): Primary | ICD-10-CM

## 2020-02-28 DIAGNOSIS — Z79.01 LONG TERM (CURRENT) USE OF ANTICOAGULANTS: ICD-10-CM

## 2020-02-28 LAB
HCT VFR BLD AUTO: 46.5 % (ref 35–47)
INR PPP: 5.04 (ref 0.86–1.14)

## 2020-02-28 PROCEDURE — 85610 PROTHROMBIN TIME: CPT | Performed by: FAMILY MEDICINE

## 2020-02-28 PROCEDURE — 36415 COLL VENOUS BLD VENIPUNCTURE: CPT | Performed by: FAMILY MEDICINE

## 2020-02-28 PROCEDURE — 90834 PSYTX W PT 45 MINUTES: CPT | Performed by: PSYCHOLOGIST

## 2020-02-28 PROCEDURE — 85014 HEMATOCRIT: CPT | Performed by: FAMILY MEDICINE

## 2020-02-28 ASSESSMENT — COLUMBIA-SUICIDE SEVERITY RATING SCALE - C-SSRS
1. IN THE PAST MONTH, HAVE YOU WISHED YOU WERE DEAD OR WISHED YOU COULD GO TO SLEEP AND NOT WAKE UP?: NO
1. IN THE PAST MONTH, HAVE YOU WISHED YOU WERE DEAD OR WISHED YOU COULD GO TO SLEEP AND NOT WAKE UP?: YES
4. HAVE YOU HAD THESE THOUGHTS AND HAD SOME INTENTION OF ACTING ON THEM?: YES
6. HAVE YOU EVER DONE ANYTHING, STARTED TO DO ANYTHING, OR PREPARED TO DO ANYTHING TO END YOUR LIFE?: YES
ATTEMPT PAST THREE MONTHS: NO
2. HAVE YOU ACTUALLY HAD ANY THOUGHTS OF KILLING YOURSELF?: NO
4. HAVE YOU HAD THESE THOUGHTS AND HAD SOME INTENTION OF ACTING ON THEM?: NO
3. HAVE YOU BEEN THINKING ABOUT HOW YOU MIGHT KILL YOURSELF?: YES
LETHALITY/MEDICAL DAMAGE CODE MOST RECENT ACTUAL ATTEMPT: MODERATE PHYSICAL DAMAGE, MEDICAL ATTENTION NEEDED
2. HAVE YOU ACTUALLY HAD ANY THOUGHTS OF KILLING YOURSELF LIFETIME?: YES
TOTAL  NUMBER OF INTERRUPTED ATTEMPTS PAST 3 MONTHS: NO
5. HAVE YOU STARTED TO WORK OUT OR WORKED OUT THE DETAILS OF HOW TO KILL YOURSELF? DO YOU INTEND TO CARRY OUT THIS PLAN?: NO
LETHALITY/MEDICAL DAMAGE CODE FIRST ACTUAL ATTEMPT: MODERATE PHYSICAL DAMAGE, MEDICAL ATTENTION NEEDED
TOTAL  NUMBER OF INTERRUPTED ATTEMPTS LIFETIME: YES
LETHALITY/MEDICAL DAMAGE CODE MOST LETHAL ACTUAL ATTEMPT: MODERATE PHYSICAL DAMAGE, MEDICAL ATTENTION NEEDED
REASONS FOR IDEATION LIFETIME: COMPLETELY TO GET ATTENTION, REVENGE OR A REACTION FROM OTHERS
6. HAVE YOU EVER DONE ANYTHING, STARTED TO DO ANYTHING, OR PREPARED TO DO ANYTHING TO END YOUR LIFE?: NO
TOTAL  NUMBER OF ABORTED OR SELF INTERRUPTED ATTEMPTS PAST LIFETIME: YES
5. HAVE YOU STARTED TO WORK OUT OR WORKED OUT THE DETAILS OF HOW TO KILL YOURSELF? DO YOU INTEND TO CARRY OUT THIS PLAN?: YES
ATTEMPT LIFETIME: YES
TOTAL  NUMBER OF ABORTED OR SELF INTERRUPTED ATTEMPTS PAST 3 MONTHS: NO

## 2020-02-28 ASSESSMENT — ANXIETY QUESTIONNAIRES
1. FEELING NERVOUS, ANXIOUS, OR ON EDGE: SEVERAL DAYS
GAD7 TOTAL SCORE: 5
6. BECOMING EASILY ANNOYED OR IRRITABLE: MORE THAN HALF THE DAYS
3. WORRYING TOO MUCH ABOUT DIFFERENT THINGS: NOT AT ALL
4. TROUBLE RELAXING: SEVERAL DAYS
7. FEELING AFRAID AS IF SOMETHING AWFUL MIGHT HAPPEN: NOT AT ALL
2. NOT BEING ABLE TO STOP OR CONTROL WORRYING: NOT AT ALL
5. BEING SO RESTLESS THAT IT IS HARD TO SIT STILL: SEVERAL DAYS

## 2020-02-28 ASSESSMENT — PATIENT HEALTH QUESTIONNAIRE - PHQ9: SUM OF ALL RESPONSES TO PHQ QUESTIONS 1-9: 8

## 2020-02-28 NOTE — TELEPHONE ENCOUNTER
Latasha  notified this writer of critical lab.  INR 5.04. Route to provider in clinic, will also discuss with provider then contact patient..  Princess Atkins RN

## 2020-02-28 NOTE — TELEPHONE ENCOUNTER
Component      Latest Ref Rng & Units 2/4/2020 2/11/2020 2/28/2020   INR      0.86 - 1.14 1.93 (H) 3.52 (H) 5.04 (HH)     2/4/2020Change your warfarin dose to       10 mg every Sun, Thu; 12.5 mg all other days         Looks like she is a coumadin clinic patient.   Please send this to them.     KAVYA Castillo MD

## 2020-02-28 NOTE — TELEPHONE ENCOUNTER
Call placed to [facility] [phone].  Relayed critical lab value.  Princess Atkins RN

## 2020-02-28 NOTE — PROGRESS NOTES
Progress Note  Disclaimer: This note consists of symbols derived from keyboarding, dictation and/or voice recognition software. As a result, there may be errors in the script that have gone undetected. Please consider this when interpreting information found in this chart.    Client Name: Bia Bill  Date: 2/28/2020         Service Type: Individual      Session Start Time: 8:30 AM   session End Time 9:15 AM   session Length: 45     Session #: 52     Attendees: Client attended alone    Treatment Plan Last Reviewed: 11/6/2019  PHQ-9 / CHIN-7 : See flowsheet     DATA       Progress Since Last Session (Related to Symptoms / Goals / Homework):   Symptoms: Stable    Homework: Achieved / completed to satisfaction      Episode of Care Goals: Satisfactory progress - ACTION (Actively working towards change); Intervened by reinforcing change plan / affirming steps taken     Current / Ongoing Stressors and Concerns:  Client reports no hospitalizations since her last visit she is working with diabetic education and INR to balance blood sugar and Jantoven levels.  She is maintaining good boundaries with family and understanding that even though she has to do this she is still loved and capable of loving others.     Treatment Objective(s) Addressed in This Session:   Increase interest, engagement, and pleasure in doing things  Decrease frequency and intensity of feeling down, depressed, hopeless        Intervention:   CBT: Behavioral activation  processed significant life change, encouraged client to attend diabetic education    ASSESSMENT: Current Emotional / Mental Status (status of significant symptoms):   Risk status (Self / Other harm or suicidal ideation)   Client denies current fears or concerns for personal safety.   Client denies current or recent suicidal ideation or behaviors.   Client denies current or recent homicidal ideation or behaviors.   Client denies current  or recent self injurious behavior or ideation.   Client denies other safety concerns.   Recommended that patient call 911 or go to the local ED should there be a change in any of these risk factors.     Appearance:   Appropriate    Eye Contact:   Good    Psychomotor Behavior: Normal    Attitude:   Cooperative    Orientation:   All   Speech    Rate / Production: Normal     Volume:  Normal    Mood:    Normal   Affect:    Appropriate    Thought Content:  Clear    Thought Form:  Coherent  Logical    Insight:    Good      Medication Review:   No changes to current psychiatric medication(s)     Medication Compliance:   Yes     Changes in Health Issues:    see above     Chemical Use Review:   Substance Use: Chemical use reviewed, no active concerns identified      Tobacco Use: Client reports she is down to 1 cigarette per day from 2 packs/day.  Diagnosis:  1. Moderate mixed bipolar I disorder (H)         Collateral Reports Completed:   Not Applicable    PLAN: (Client Tasks / Therapist Tasks / Other)  Client to continue With her self-care routine and maintenance of boundaries.   a. Client will Learn to set boundaries when appropriate  b. Client will Identify 2 individuals they can call on for support, distraction    Kd Morris                                                         ________________________________________________________________________    Treatment Plan    Client's Name: Bia Bill  YOB: 1965    Date: 11/6/2019      DSM5 Diagnoses: (Sustained by DSM5 Criteria Listed Above)  Diagnoses: 296.40 Bipolar I Disorder Current or Most Recent Episode Manic, Unspecified  Psychosocial & Contextual Factors: financial difficulties, chronic pain, roommate issues  WHODAS 2.0 (12 item)               This questionnaire asks about difficulties due to health conditions. Health conditions             include disease or illnesses, other health problems that may be short or long lasting,              injuries, mental health or emotional problems, and problems with alcohol or drugs.                         Think back over the past 30 days and answer these questions, thinking about how much             difficulty you had doing the following activities. For each question, please [place] only             one response.      S1  Standing for long periods such as 30 minutes?  Mild =           2    S2  Taking care of household responsibilities?  Moderate =   3    S3  Learning a new task, for example, learning how to get to a new place?  Mild =           2    S4  How much of a problem do you have joining community activities (for example, festivals, Temple or other activities) in the same way as anyone else can?  Moderate =   3    S5  How much have you been emotionally affected by your health problems?  Mild =           2        In the past 30 days, how much difficulty did you have in:    S6  Concentrating on doing something for ten minutes?  Mild =           2    S7  Walking a long distance such as a kilometer (or equivalent)?  Severe =       4    S8  Washing your whole body?  None =         1    S9  Getting dressed?  None =         1    S10  Dealing with people you do not know?  Moderate =   3    S11  Maintaining a friendship?  Severe =       4    S12  Your day to day work?  Moderate =   3       H1  Overall, in the past 30 days, how many days were these difficulties present?  Record number of days seven    H2  In the past 30 days, for how many days were you totally unable to carry out your usual activities or work because of any health condition?  Record number of days  seven    H3  In the past 30 days, not counting the days that you were totally unable, for how many days did you cut back or reduce your usual activities or work because of any health condition?  Record number of days five                   Referral / Collaboration:  Referral to another professional/service is not indicated at this  time..    Anticipated number of session or this episode of care: 60    Goals  2. Education- the Biopsychosocial model of depression  a. Client will be able to describe how depression is effecting them physically, emotionally and socially  3. Behavioral Activation  a. Client will learn to assess their depression on a day to day basis  b. Client will Identify two forms of exercise/activity and engage in them 3 times per week  c. Client will Identify 3 things that make them laugh, and engage in these 5 times per week.  d. Client will Identify 1-2Creative activities or hobbies  and engage in them 2 times per week  e. Client will identify music, movies, books that make them feel good and use them 3-4 times per week  4. Self-care  a. Client will identify 5 things they can do just for themselves  b. Client will take time for quiet, reflection, meditation 5 times per week  c. Client will Learn to set boundaries when appropriate  d. Client will Identify 2 individuals they can call on for support, distraction  5. Assessment of progress  a. Client will engage in assessment of their progress on a regular basis    Bipolar Disorder  Treatment plan:  1. Education- the Biopsychosocial model of Bipolar Disorder    a. Client will be able to describe in general terms what Bipolar Disorder  is and is not  b. Client will be able to describe how Bipolar Disorder  has affected their life in at least two different areas, such as school or work and Home/relationships  c. Clients parents/guardians or significant others will be provided information on what Bipolar Disorder  is and the ways it can affect relationships and be encouraged to be a part of clients treatment team.  2. Medication  a. Client will participate in medication evaluation for Bipolar Disorder  symptoms and follow medication recommendations.   3. Identification and management of triggers  a. Client and therapist will examine patient s history to determine if there are  predictable triggers for manic or depressive episodes (e.g. boredom, anger, family stress)  b. Client and therapist will develop means of diffusing these triggers (e.g. relaxation strategies, boundary setting, anger management)  4. Comorbid conditions   a. Client and therapist will asses for comorbid conditions (e.g. anxiety, depression, substance use). and add additional items to treatment plan as needed  5. Self-care  a. Client will identify 3 things they can do just for themselves  b. Client will take time for quiet, reflection, meditation 3 times per week  c. Client will Learn to set boundaries when appropriate  d. Client will Identify 2 individuals they can call on for support, distraction  6. Behavioral Activation  a. Client will Identify two forms of exercise/activity and engage in them 3 times per week  b. Client will Identify 2 things that make them laugh, and engage in these 3 times per week.  c. Client will Identify 2 Creative activities or hobbies  and engage in them 2 times per week  d. Client will identify music, movies, books that make them feel good and use them 3 times per week  7. Assessment of progress  a. Client will engage in assessment of their progress on a regular basis    Client has reviewed and agreed to the above plan.      Kd Morris  4/3/2018   Electrodesiccation Text: The wound bed was treated with electrodesiccation after the biopsy was performed.

## 2020-02-28 NOTE — TELEPHONE ENCOUNTER
Anticoagulation Management    Unable to reach [facility] today.    Today's INR result of 5.04 is supratherapeutic (goal INR of 2.0-3.0).  Result received from: Clinic Lab    Follow up required to confirm warfarin dose taken and assess for changes    Left message to hold warfarin tonight.      Anticoagulation clinic to follow up    Asha Carlos RN

## 2020-02-28 NOTE — TELEPHONE ENCOUNTER
ANTICOAGULATION MANAGEMENT     Patient Name:  Bia Bill  Date:  2020    ASSESSMENT /SUBJECTIVE:      Today's INR result of 5.04 is supratherapeutic. Goal INR of 2.0-3.0      Warfarin dose taken: More warfarin taken than instructed which may be affecting INR    Diet: No new diet changes affecting INR    Medication changes/ interactions: No interaction expected between metformin and warfarin (started about a month ago)    Previous INR: Supratherapeutic     S/S of bleeding or thromboembolism: No    New injury or illness:  No    Upcoming surgery, procedure or cardioversion:  No    Additional findings: none      PLAN:    Spoke with Bia regarding INR result and instructed:     Warfarin Dosing Instructions: hold warfarin x 2 Fri  and Sat  then change your warfarin dose to 12.5mg Fri, 10mg on all other days (12% change from what she has been taking)    Instructed patient to follow up no later than: Mon 3/2  Lab visit scheduled    Education provided: Yes: significance of current result, monitor for s/sx of bleeding, when to seek care      Norah verbalizes understanding and agrees to warfarin dosing plan.    Instructed to call the Anticoagulation Clinic for any changes, questions or concerns. (#[phone])        OBJECTIVE:  INR   Date Value Ref Range Status   2020 5.04 (HH) 0.86 - 1.14 Final     Comment:     Critical Value called to and read back by  [facility]TECH [id number] 20                Anticoagulation Summary  As of 2020    INR goal:   2.0-3.0   TTR:   38.3 % (11.2 mo)   INR used for dosin.04! (2020)   Warfarin maintenance plan:   12.5 mg (5 mg x 2.5) every Fri; 10 mg (5 mg x 2) all other days   Full warfarin instructions:   : Hold; : Hold; Otherwise 12.5 mg every Fri; 10 mg all other days   Weekly warfarin total:   72.5 mg   Plan last modified:   Jessica Herring, RN (2020)   Next INR check:   3/2/2020   Priority:   High   Target end date:    Indefinite    Indications    PVD (peripheral vascular disease) with claudication (H) [I73.9]  Long term current use of anticoagulant therapy (Resolved) [Z79.01]  DVT (deep venous thrombosis) (H) [I82.409]  History of stroke symptoms  stroke ruled out on imaging [Z86.73]             Anticoagulation Episode Summary     INR check location:       Preferred lab:       Send INR reminders to:   ANGI BHAKTA    Comments:   * lab draw due to elevated hematocrit (fingerstick meters don't work). LEFT LE. STENT x 3 LEFT UPPER LEG. Previous arterial clot. AS of 10/9/19 Goal range 2.0-3.0.      Anticoagulation Care Providers     Provider Role Specialty Phone number    Kd Leong MD [facility] [phone]

## 2020-02-28 NOTE — TELEPHONE ENCOUNTER
We have attempted to contact this patient via phone for the last week.  She has not returned the phone calls to the clinic.  Patient does not have my chart, how would you like the care team to proceed with the recommendations you have for this patient?    Routing to provider.    Roxana KAUFFMAN Rn

## 2020-02-29 ASSESSMENT — ANXIETY QUESTIONNAIRES: GAD7 TOTAL SCORE: 5

## 2020-03-02 ENCOUNTER — TELEPHONE (OUTPATIENT)
Dept: FAMILY MEDICINE | Facility: CLINIC | Age: 55
End: 2020-03-02

## 2020-03-02 DIAGNOSIS — I73.9 PVD (PERIPHERAL VASCULAR DISEASE) WITH CLAUDICATION (H): ICD-10-CM

## 2020-03-02 DIAGNOSIS — Z86.73 HISTORY OF STROKE: ICD-10-CM

## 2020-03-02 DIAGNOSIS — I82.409 DVT (DEEP VENOUS THROMBOSIS) (H): ICD-10-CM

## 2020-03-02 DIAGNOSIS — Z79.01 LONG TERM (CURRENT) USE OF ANTICOAGULANTS: ICD-10-CM

## 2020-03-02 DIAGNOSIS — O22.30 DEEP PHLEBOTHROMBOSIS, ANTEPARTUM, WITH DELIVERY (H): ICD-10-CM

## 2020-03-02 DIAGNOSIS — I82.409 DEEP PHLEBOTHROMBOSIS, ANTEPARTUM, WITH DELIVERY (H): ICD-10-CM

## 2020-03-02 LAB
HCT VFR BLD AUTO: 47.1 % (ref 35–47)
INR PPP: 1.37 (ref 0.86–1.14)

## 2020-03-02 PROCEDURE — 36415 COLL VENOUS BLD VENIPUNCTURE: CPT | Performed by: FAMILY MEDICINE

## 2020-03-02 PROCEDURE — 85014 HEMATOCRIT: CPT | Performed by: FAMILY MEDICINE

## 2020-03-02 PROCEDURE — 85610 PROTHROMBIN TIME: CPT | Performed by: FAMILY MEDICINE

## 2020-03-02 NOTE — TELEPHONE ENCOUNTER
ANTICOAGULATION MANAGEMENT     Patient Name:  Bia Bill  Date:  3/2/2020    ASSESSMENT /SUBJECTIVE:      Today's INR result of 1.37 is subtherapeutic. Goal INR of 2.0-3.0      Warfarin dose taken: Warfarin recently held as instructed which may be affecting INR    Diet: No new diet changes affecting INR    Medication changes/ interactions: No new medications/supplements affecting INR    Previous INR: Supratherapeutic     S/S of bleeding or thromboembolism: No    New injury or illness:  No    Upcoming surgery, procedure or cardioversion:  No    Additional findings: None      PLAN:    Spoke with Bia regarding INR result and instructed:     Warfarin Dosing Instructions: 15mg today; 12.5mg tomorrow (Tues); 10mg Wed/Thurs    Instructed patient to follow up no later than: 3/6/20  Lab visit scheduled    Education provided: Yes: Watch for any s/s of clotting such as new pain/swelling/tenderness/redness/warmth in an extremity; sudden SOB and chest pain, s/s of a stroke including weakness/numbness on one side of the body, facial droop, difficulty speaking. Report any of these s/s immediately.      Norah verbalizes understanding and agrees to warfarin dosing plan.    Instructed to call the Anticoagulation Clinic for any changes, questions or concerns. (#[phone])        OBJECTIVE:  INR   Date Value Ref Range Status   2020 1.37 (H) 0.86 - 1.14 Final             Anticoagulation Summary  As of 3/2/2020    INR goal:   2.0-3.0   TTR:   38.2 % (11.2 mo)   INR used for dosin.37! (3/2/2020)   Warfarin maintenance plan:   12.5 mg (5 mg x 2.5) every Fri; 10 mg (5 mg x 2) all other days   Full warfarin instructions:   3/2: 15 mg; 3/3: 12.5 mg; Otherwise 12.5 mg every Fri; 10 mg all other days   Weekly warfarin total:   72.5 mg   Plan last modified:   Jessica Herring RN (2020)   Next INR check:   3/6/2020   Priority:   High   Target end date:   Indefinite    Indications    PVD (peripheral vascular  disease) with claudication (H) [I73.9]  Long term current use of anticoagulant therapy (Resolved) [Z79.01]  DVT (deep venous thrombosis) (H) [I82.409]  History of stroke symptoms  stroke ruled out on imaging [Z86.73]             Anticoagulation Episode Summary     INR check location:       Preferred lab:       Send INR reminders to:   ANGI BHAKTA    Comments:   * lab draw due to elevated hematocrit (fingerstick meters don't work). LEFT LE. STENT x 3 LEFT UPPER LEG. Previous arterial clot. AS of 10/9/19 Goal range 2.0-3.0.      Anticoagulation Care Providers     Provider Role Specialty Phone number    Kd Leong MD Responsible [facility] [phone]

## 2020-03-06 ENCOUNTER — TELEPHONE (OUTPATIENT)
Dept: FAMILY MEDICINE | Facility: CLINIC | Age: 55
End: 2020-03-06

## 2020-03-06 DIAGNOSIS — O22.30 DEEP PHLEBOTHROMBOSIS, ANTEPARTUM, WITH DELIVERY (H): ICD-10-CM

## 2020-03-06 DIAGNOSIS — Z86.73 HISTORY OF STROKE: ICD-10-CM

## 2020-03-06 DIAGNOSIS — I82.409 DVT (DEEP VENOUS THROMBOSIS) (H): ICD-10-CM

## 2020-03-06 DIAGNOSIS — Z79.01 LONG TERM (CURRENT) USE OF ANTICOAGULANTS: ICD-10-CM

## 2020-03-06 DIAGNOSIS — I73.9 PVD (PERIPHERAL VASCULAR DISEASE) WITH CLAUDICATION (H): ICD-10-CM

## 2020-03-06 DIAGNOSIS — I82.409 DEEP PHLEBOTHROMBOSIS, ANTEPARTUM, WITH DELIVERY (H): ICD-10-CM

## 2020-03-06 LAB
HCT VFR BLD AUTO: 48.8 % (ref 35–47)
INR PPP: 2.35 (ref 0.86–1.14)

## 2020-03-06 PROCEDURE — 36415 COLL VENOUS BLD VENIPUNCTURE: CPT | Performed by: FAMILY MEDICINE

## 2020-03-06 PROCEDURE — 85014 HEMATOCRIT: CPT | Performed by: FAMILY MEDICINE

## 2020-03-06 PROCEDURE — 85610 PROTHROMBIN TIME: CPT | Performed by: FAMILY MEDICINE

## 2020-03-06 NOTE — TELEPHONE ENCOUNTER
ANTICOAGULATION MANAGEMENT     Patient Name:  Bia Bill  Date:  3/6/2020    ASSESSMENT /SUBJECTIVE:      Today's INR result of 2.35 is therapeutic. Goal INR of 2.0-3.0      Warfarin dose taken: Warfarin taken as previously instructed    Diet: No new diet changes affecting INR    Medication changes/ interactions: No new medications/supplements affecting INR    Previous INR: Subtherapeutic     S/S of bleeding or thromboembolism: No    New injury or illness:  No    Upcoming surgery, procedure or cardioversion:  No    Additional findings: NA      PLAN:    Spoke with Bia regarding INR result and instructed:     Warfarin Dosing Instructions: Continue new recommneded maintenance dose of 12.5 mg Monday and 10 mg ROW    Instructed patient to follow up no later than: 1 week  Lab visit scheduled    Education provided: Meron Almazan verbalizes understanding and agrees to warfarin dosing plan.    Instructed to call the Anticoagulation Clinic for any changes, questions or concerns. (#[phone])        OBJECTIVE:  INR   Date Value Ref Range Status   2020 2.35 (H) 0.86 - 1.14 Final             Anticoagulation Summary  As of 3/6/2020    INR goal:   2.0-3.0   TTR:   38.6 % (11.2 mo)   INR used for dosin.35 (3/6/2020)   Warfarin maintenance plan:   12.5 mg (5 mg x 2.5) every Mon; 10 mg (5 mg x 2) all other days   Full warfarin instructions:   12.5 mg every Mon; 10 mg all other days   Weekly warfarin total:   72.5 mg   Plan last modified:   Carmen Way RN (3/6/2020)   Next INR check:   3/13/2020   Priority:   High   Target end date:   Indefinite    Indications    PVD (peripheral vascular disease) with claudication (H) [I73.9]  Long term current use of anticoagulant therapy (Resolved) [Z79.01]  DVT (deep venous thrombosis) (H) [I82.409]  History of stroke symptoms  stroke ruled out on imaging [Z86.73]             Anticoagulation Episode Summary     INR check location:       Preferred lab:       Send  INR reminders to:   ANGI BHAKTA    Comments:   * lab draw due to elevated hematocrit (fingerstick meters don't work). LEFT LE. STENT x 3 LEFT UPPER LEG. Previous arterial clot. AS of 10/9/19 Goal range 2.0-3.0.      Anticoagulation Care Providers     Provider Role Specialty Phone number    Kd Leong MD [facility] [phone]

## 2020-03-09 NOTE — TELEPHONE ENCOUNTER
Reviewed by Dr. Summers per routing history. No further action needed  But will send note in letter.        VIVI CaldwellN, RN

## 2020-03-10 DIAGNOSIS — K21.9 GASTROESOPHAGEAL REFLUX DISEASE WITHOUT ESOPHAGITIS: ICD-10-CM

## 2020-03-10 NOTE — TELEPHONE ENCOUNTER
"OMEPRAZOLE 20MG CPDR      Last Written Prescription Date:  2/12/20  Last Fill Quantity: 60,   # refills: 0  Last Office Visit: 1/30/20  Future Office visit:    Next 5 appointments (look out 90 days)    Mar 13, 2020  2:00 PM CDT  Return Visit with [facility] [address] [address]  [phone]   Mar 27, 2020  8:30 AM CDT  Return Visit with [facility] [address] [address]  [phone]           Requested Prescriptions   Pending Prescriptions Disp Refills     omeprazole (PRILOSEC) 20 MG DR capsule [Pharmacy Med Name: OMEPRAZOLE 20MG CPDR] 60 capsule 0     Sig: TAKE ONE CAPSULE BY MOUTH TWICE A DAY  **NEED MEDICATION REVIEW, PLEASE CALL TO SCHEDULE       PPI Protocol Passed - 3/10/2020  5:02 AM        Passed - Not on Clopidogrel (unless Pantoprazole ordered)        Passed - No diagnosis of osteoporosis on record        Passed - Recent (12 mo) or future (30 days) visit within the authorizing provider's specialty     Patient has had an office visit with the authorizing provider or a provider within the authorizing providers department within the previous 12 mos or has a future within next 30 days. See \"Patient Info\" tab in inbasket, or \"Choose Columns\" in Meds & Orders section of the refill encounter.              Passed - Medication is active on med list        Passed - Patient is age 18 or older        Passed - No active pregnacy on record        Passed - No positive pregnancy test in past 12 months             "

## 2020-03-13 ENCOUNTER — TELEPHONE (OUTPATIENT)
Dept: FAMILY MEDICINE | Facility: CLINIC | Age: 55
End: 2020-03-13

## 2020-03-13 ENCOUNTER — OFFICE VISIT (OUTPATIENT)
Dept: PSYCHOLOGY | Facility: CLINIC | Age: 55
End: 2020-03-13
Payer: COMMERCIAL

## 2020-03-13 ENCOUNTER — PATIENT OUTREACH (OUTPATIENT)
Dept: CARE COORDINATION | Facility: CLINIC | Age: 55
End: 2020-03-13

## 2020-03-13 DIAGNOSIS — F31.62 MODERATE MIXED BIPOLAR I DISORDER (H): Primary | ICD-10-CM

## 2020-03-13 DIAGNOSIS — Z86.73 HISTORY OF STROKE: ICD-10-CM

## 2020-03-13 DIAGNOSIS — I73.9 PVD (PERIPHERAL VASCULAR DISEASE) WITH CLAUDICATION (H): ICD-10-CM

## 2020-03-13 DIAGNOSIS — I82.409 DEEP PHLEBOTHROMBOSIS, ANTEPARTUM, WITH DELIVERY (H): ICD-10-CM

## 2020-03-13 DIAGNOSIS — I82.409 DVT (DEEP VENOUS THROMBOSIS) (H): ICD-10-CM

## 2020-03-13 DIAGNOSIS — Z79.01 LONG TERM (CURRENT) USE OF ANTICOAGULANTS: ICD-10-CM

## 2020-03-13 DIAGNOSIS — O22.30 DEEP PHLEBOTHROMBOSIS, ANTEPARTUM, WITH DELIVERY (H): ICD-10-CM

## 2020-03-13 LAB
HCT VFR BLD AUTO: 50.4 % (ref 35–47)
INR PPP: 3.22 (ref 0.86–1.14)

## 2020-03-13 PROCEDURE — 90834 PSYTX W PT 45 MINUTES: CPT | Performed by: PSYCHOLOGIST

## 2020-03-13 PROCEDURE — 36415 COLL VENOUS BLD VENIPUNCTURE: CPT | Performed by: FAMILY MEDICINE

## 2020-03-13 PROCEDURE — 85014 HEMATOCRIT: CPT | Performed by: FAMILY MEDICINE

## 2020-03-13 PROCEDURE — 85610 PROTHROMBIN TIME: CPT | Performed by: FAMILY MEDICINE

## 2020-03-13 NOTE — PROGRESS NOTES
Clinic Care Coordination Contact    Follow Up Progress Note      Assessment: Outreach to patient.     Norah states she is feeling pretty good currently.     She states her breathing has been good. She denies need for prednisone. She verifies she is using her inhalers and nebulizer as directed. She states she had to change brands on her albuterol due to her insurance not covering the previous brand.  Patient is able to speak in full sentences without shortness of breath. She sounds the best she has in several months.     Patient states she is checking her blood sugars. Her range is between 100- 153. She states she is symptomatic with shakes, sweating and irritability with both lows and highs over 160. Patient is participating in diabetic education.     Her legs are currently healed. No signs of cellulitis at this time. Edema is minimal.      Goals addressed this encounter:   Goals Addressed                 This Visit's Progress      #1 Medical (pt-stated)   On track     Goal Statement: I want my cellulitis to heal.  Date goal set: 12/5/2019   Measure of Success: Cellulitis will be healed.  Barriers: current infection  Strengths: care coordination, lymphedema therapy  Date to Achieve By: April 2020   Patient expressed understanding of goal: yes    Action steps to achieve this goal  1. I will take and finish my antibiotics as prescribed  2. I will elevate my legs as much as possible for the first 48 hours  3. I will follow up with PCP as scheduled         #2 Medical (pt-stated)   On track     Goal Statement: I want my shortness of breath to improve    Date goal set: 11/1/19    Measure of Success: When I am not short of breath    Barriers: COPD    Strengths: Motivated    Date to Achieve By: April 2020    Patient expressed understanding of goal: Yes    Action steps to achieve this goal  1. I will get my nebulizer fixed - got new machine 1/2020  2. I will use nebulizer as instructed  3. I will see PCP  as scheduled  4. I  will use my asthma action plan         #3 Improve chronic symptoms (pt-stated)   90%     Goal Statement: I want my edema to improve.  Date goal set: 12/5/2019   Measure of Success: I will be able to put support stockings on by myself.  Barriers: lymphedema  Strengths: care coordination, lymphedema clinic  Date to Achieve By: 3/1/20  Patient expressed understanding of goal: yes    Action steps to achieve this goal  1. I will elevate my legs throughout the day  2. I will attend lymphedema clinic appointments  3. I will take my diuretics as prescribed         #4 Monitoring (pt-stated)   70%     Goal Statement: I will weigh myself every day.  Date goal set: 12/5/2019   Measure of Success: Consistent record of daily weights.  Barriers: Does not like scale  Strengths: care coordination support  Date to Achieve By: April 2020  Patient expressed understanding of goal: yes    Action steps to achieve this goal  1. I will weigh myself every morning.  2. I will write my weight down in a notebook.  3. I will take an extra 20-40 mg of Lasix if my weight increases by 3 lbs.         Intervention/Education provided during outreach: Diet control. Carb counting.  Monitor legs for edema and open areas.      Outreach Frequency: monthly    Plan:   Patient will attend all scheduled appointments.     Care Coordinator will follow up in one month.     Michela Whitaker RN, [facility] RN Coordinator  [facility]   3/13/2020    1:36 PM  [phone]

## 2020-03-13 NOTE — TELEPHONE ENCOUNTER
Patient's hematocrit today was 50% so INR should be okay and not need adjusting.     ANTICOAGULATION MANAGEMENT     Patient Name:  Bia Bill  Date:  3/13/2020    ASSESSMENT /SUBJECTIVE:      Today's INR result of 3.22 is supratherapeutic. Goal INR of 2.0-3.0      Warfarin dose taken: Warfarin taken as previously instructed    Diet: No new diet changes affecting INR    Medication changes/ interactions: No new medications/supplements affecting INR    Previous INR: Therapeutic     S/S of bleeding or thromboembolism: No    New injury or illness:  Yes: watery diarrhea x 4 today    Upcoming surgery, procedure or cardioversion:  No    Additional findings: patient states her blood sugars are very labile lately as well. She feels okay, except for diarrhea. Patient did take imodium when she got home this afternoon and is aware to stay hydrated.      PLAN:    Spoke with irving regarding INR result and instructed:     Warfarin Dosing Instructions: 7.5 mg today and 10 mg Monday then continue your current warfarin dose of 12.5 mg Mondays; 10 mg all other dyas     Instructed patient to follow up no later than: 2 weeks. Patient goes to lab after seeing psychologist    Education provided: Yes: Patient told to call on 3-16 if she is still having diarrhea or has any questions or concerns.      Patient verbalizes understanding and agrees to warfarin dosing plan.    Instructed to call the Anticoagulation Clinic for any changes, questions or concerns. (#[phone])        OBJECTIVE:  INR   Date Value Ref Range Status   03/13/2020 3.22 (H) 0.86 - 1.14 Final             Anticoagulation Summary  As of 3/13/2020    INR goal:   2.0-3.0   TTR:   40.2 % (11.2 mo)   INR used for dosing:   3.22! (3/13/2020)   Warfarin maintenance plan:   12.5 mg (5 mg x 2.5) every Mon; 10 mg (5 mg x 2) all other days   Full warfarin instructions:   3/13: 7.5 mg; 3/16: 10 mg; Otherwise 12.5 mg every Mon; 10 mg all other days   Weekly warfarin total:   72.5  mg   Plan last modified:   Carmen Way, RN (3/6/2020)   Next INR check:   3/27/2020   Priority:   High   Target end date:   Indefinite    Indications    PVD (peripheral vascular disease) with claudication (H) [I73.9]  Long term current use of anticoagulant therapy (Resolved) [Z79.01]  DVT (deep venous thrombosis) (H) [I82.409]  History of stroke symptoms  stroke ruled out on imaging [Z86.73]             Anticoagulation Episode Summary     INR check location:       Preferred lab:       Send INR reminders to:   ANGI BHAKTA    Comments:   * lab draw due to elevated hematocrit (fingerstick meters don't work). Needs to have adjusted INR if hct over 55%. LEFT LE. STENT x 3 LEFT UPPER LEG. Previous arterial clot. AS of 10/9/19 Goal range 2.0-3.0.      Anticoagulation Care Providers     Provider Role Specialty Phone number    Kd Leong MD [facility] [phone]

## 2020-03-17 ASSESSMENT — ANXIETY QUESTIONNAIRES
7. FEELING AFRAID AS IF SOMETHING AWFUL MIGHT HAPPEN: NOT AT ALL
3. WORRYING TOO MUCH ABOUT DIFFERENT THINGS: SEVERAL DAYS
2. NOT BEING ABLE TO STOP OR CONTROL WORRYING: SEVERAL DAYS
5. BEING SO RESTLESS THAT IT IS HARD TO SIT STILL: SEVERAL DAYS
1. FEELING NERVOUS, ANXIOUS, OR ON EDGE: SEVERAL DAYS
GAD7 TOTAL SCORE: 7
4. TROUBLE RELAXING: SEVERAL DAYS
6. BECOMING EASILY ANNOYED OR IRRITABLE: MORE THAN HALF THE DAYS

## 2020-03-17 ASSESSMENT — PATIENT HEALTH QUESTIONNAIRE - PHQ9: SUM OF ALL RESPONSES TO PHQ QUESTIONS 1-9: 8

## 2020-03-17 NOTE — PROGRESS NOTES
Progress Note  Disclaimer: This note consists of symbols derived from keyboarding, dictation and/or voice recognition software. As a result, there may be errors in the script that have gone undetected. Please consider this when interpreting information found in this chart.    Client Name: Bia Bill  Date: 3/13/2020         Service Type: Individual      Session Start Time: 2:00 PM   session End Time 2:45 PM   session Length: 45     Session #: 53     Attendees: Client attended alone    Treatment Plan Last Reviewed: 11/6/2019  PHQ-9 / CHIN-7 : See flowsheet     DATA       Progress Since Last Session (Related to Symptoms / Goals / Homework):   Symptoms: Improving Client is doing better with maintaining personal boundaries    Homework: Achieved / completed to satisfaction      Episode of Care Goals: Satisfactory progress - ACTION (Actively working towards change); Intervened by reinforcing change plan / affirming steps taken     Current / Ongoing Stressors and Concerns:  Client reports no hospitalizations since her last visit she is working with diabetic education and INR to balance blood sugar and Jantoven levels.  She is maintaining good boundaries with family and understanding that even though she has to do this she is still loved and capable of loving others.     Treatment Objective(s) Addressed in This Session:   Increase interest, engagement, and pleasure in doing things  Decrease frequency and intensity of feeling down, depressed, hopeless  Improve concentration, focus, and mindfulness in daily activities         Intervention:   CBT: Behavioral activation  processed significant life change, encouraged client to attend diabetic education    ASSESSMENT: Current Emotional / Mental Status (status of significant symptoms):   Risk status (Self / Other harm or suicidal ideation)   Client denies current fears or concerns for personal safety.   Client denies current or  recent suicidal ideation or behaviors.   Client denies current or recent homicidal ideation or behaviors.   Client denies current or recent self injurious behavior or ideation.   Client denies other safety concerns.   Recommended that patient call 911 or go to the local ED should there be a change in any of these risk factors.     Appearance:   Appropriate    Eye Contact:   Good    Psychomotor Behavior: Normal    Attitude:   Cooperative    Orientation:   All   Speech    Rate / Production: Normal     Volume:  Normal    Mood:    Normal   Affect:    Appropriate    Thought Content:  Clear    Thought Form:  Coherent  Logical    Insight:    Good      Medication Review:   No changes to current psychiatric medication(s)     Medication Compliance:   Yes     Changes in Health Issues:    see above     Chemical Use Review:   Substance Use: Chemical use reviewed, no active concerns identified      Tobacco Use: Client reports she is down to 1 cigarette per day from 2 packs/day.  Diagnosis:  1. Moderate mixed bipolar I disorder (H)         Collateral Reports Completed:   Not Applicable    PLAN: (Client Tasks / Therapist Tasks / Other)  Client to continue With her self-care routine and maintenance of boundaries.   a. Client will Learn to set boundaries when appropriate  b. Client will Identify 2 individuals they can call on for support, distraction    Kd Morris                                                         ________________________________________________________________________    Treatment Plan    Client's Name: Bia Bill  YOB: 1965    Date: 11/6/2019      DSM5 Diagnoses: (Sustained by DSM5 Criteria Listed Above)  Diagnoses: 296.40 Bipolar I Disorder Current or Most Recent Episode Manic, Unspecified  Psychosocial & Contextual Factors: financial difficulties, chronic pain, roommate issues  WHODAS 2.0 (12 item)               This questionnaire asks about difficulties due to health conditions.  Health conditions             include disease or illnesses, other health problems that may be short or long lasting,             injuries, mental health or emotional problems, and problems with alcohol or drugs.                         Think back over the past 30 days and answer these questions, thinking about how much             difficulty you had doing the following activities. For each question, please [place] only             one response.      S1  Standing for long periods such as 30 minutes?  Mild =           2    S2  Taking care of household responsibilities?  Moderate =   3    S3  Learning a new task, for example, learning how to get to a new place?  Mild =           2    S4  How much of a problem do you have joining community activities (for example, festivals, [employer]ian[employer] or other activities) in the same way as anyone else can?  Moderate =   3    S5  How much have you been emotionally affected by your health problems?  Mild =           2        In the past 30 days, how much difficulty did you have in:    S6  Concentrating on doing something for ten minutes?  Mild =           2    S7  Walking a long distance such as a kilometer (or equivalent)?  Severe =       4    S8  Washing your whole body?  None =         1    S9  Getting dressed?  None =         1    S10  Dealing with people you do not know?  Moderate =   3    S11  Maintaining a friendship?  Severe =       4    S12  Your day to day work?  Moderate =   3       H1  Overall, in the past 30 days, how many days were these difficulties present?  Record number of days seven    H2  In the past 30 days, for how many days were you totally unable to carry out your usual activities or work because of any health condition?  Record number of days  seven    H3  In the past 30 days, not counting the days that you were totally unable, for how many days did you cut back or reduce your usual activities or work because of any health condition?  Record number of days five                    Referral / Collaboration:  Referral to another professional/service is not indicated at this time..    Anticipated number of session or this episode of care: 60    Goals  2. Education- the Biopsychosocial model of depression  a. Client will be able to describe how depression is effecting them physically, emotionally and socially  3. Behavioral Activation  a. Client will learn to assess their depression on a day to day basis  b. Client will Identify two forms of exercise/activity and engage in them 3 times per week  c. Client will Identify 3 things that make them laugh, and engage in these 5 times per week.  d. Client will Identify 1-2Creative activities or hobbies  and engage in them 2 times per week  e. Client will identify music, movies, books that make them feel good and use them 3-4 times per week  4. Self-care  a. Client will identify 5 things they can do just for themselves  b. Client will take time for quiet, reflection, meditation 5 times per week  c. Client will Learn to set boundaries when appropriate  d. Client will Identify 2 individuals they can call on for support, distraction  5. Assessment of progress  a. Client will engage in assessment of their progress on a regular basis    Bipolar Disorder  Treatment plan:  1. Education- the Biopsychosocial model of Bipolar Disorder    a. Client will be able to describe in general terms what Bipolar Disorder  is and is not  b. Client will be able to describe how Bipolar Disorder  has affected their life in at least two different areas, such as school or work and Home/relationships  c. Clients parents/guardians or significant others will be provided information on what Bipolar Disorder  is and the ways it can affect relationships and be encouraged to be a part of clients treatment team.  2. Medication  a. Client will participate in medication evaluation for Bipolar Disorder  symptoms and follow medication recommendations.   3. Identification and  management of triggers  a. Client and therapist will examine patient s history to determine if there are predictable triggers for manic or depressive episodes (e.g. boredom, anger, family stress)  b. Client and therapist will develop means of diffusing these triggers (e.g. relaxation strategies, boundary setting, anger management)  4. Comorbid conditions   a. Client and therapist will asses for comorbid conditions (e.g. anxiety, depression, substance use). and add additional items to treatment plan as needed  5. Self-care  a. Client will identify 3 things they can do just for themselves  b. Client will take time for quiet, reflection, meditation 3 times per week  c. Client will Learn to set boundaries when appropriate  d. Client will Identify 2 individuals they can call on for support, distraction  6. Behavioral Activation  a. Client will Identify two forms of exercise/activity and engage in them 3 times per week  b. Client will Identify 2 things that make them laugh, and engage in these 3 times per week.  c. Client will Identify 2 Creative activities or hobbies  and engage in them 2 times per week  d. Client will identify music, movies, books that make them feel good and use them 3 times per week  7. Assessment of progress  a. Client will engage in assessment of their progress on a regular basis    Client has reviewed and agreed to the above plan.      Kd Morris  4/3/2018

## 2020-03-18 ASSESSMENT — ANXIETY QUESTIONNAIRES: GAD7 TOTAL SCORE: 7

## 2020-03-27 ENCOUNTER — OFFICE VISIT (OUTPATIENT)
Dept: PSYCHOLOGY | Facility: CLINIC | Age: 55
End: 2020-03-27
Payer: COMMERCIAL

## 2020-03-27 ENCOUNTER — TELEPHONE (OUTPATIENT)
Dept: FAMILY MEDICINE | Facility: CLINIC | Age: 55
End: 2020-03-27

## 2020-03-27 DIAGNOSIS — I82.409 DEEP PHLEBOTHROMBOSIS, ANTEPARTUM, WITH DELIVERY (H): ICD-10-CM

## 2020-03-27 DIAGNOSIS — Z79.01 LONG TERM (CURRENT) USE OF ANTICOAGULANTS: ICD-10-CM

## 2020-03-27 DIAGNOSIS — I73.9 PVD (PERIPHERAL VASCULAR DISEASE) WITH CLAUDICATION (H): ICD-10-CM

## 2020-03-27 DIAGNOSIS — I82.409 DVT (DEEP VENOUS THROMBOSIS) (H): ICD-10-CM

## 2020-03-27 DIAGNOSIS — Z86.73 HISTORY OF STROKE: ICD-10-CM

## 2020-03-27 DIAGNOSIS — F31.62 MODERATE MIXED BIPOLAR I DISORDER (H): Primary | ICD-10-CM

## 2020-03-27 DIAGNOSIS — O22.30 DEEP PHLEBOTHROMBOSIS, ANTEPARTUM, WITH DELIVERY (H): ICD-10-CM

## 2020-03-27 LAB
HCT VFR BLD AUTO: 49.2 % (ref 35–47)
INR PPP: 2.26 (ref 0.86–1.14)

## 2020-03-27 PROCEDURE — 98968 PH1 ASSMT&MGMT NQHP 21-30: CPT | Performed by: PSYCHOLOGIST

## 2020-03-27 PROCEDURE — 36415 COLL VENOUS BLD VENIPUNCTURE: CPT | Performed by: FAMILY MEDICINE

## 2020-03-27 PROCEDURE — 85014 HEMATOCRIT: CPT | Performed by: FAMILY MEDICINE

## 2020-03-27 PROCEDURE — 85610 PROTHROMBIN TIME: CPT | Performed by: FAMILY MEDICINE

## 2020-03-27 NOTE — PROGRESS NOTES
Progress Note  Disclaimer: This note consists of symbols derived from keyboarding, dictation and/or voice recognition software. As a result, there may be errors in the script that have gone undetected. Please consider this when interpreting information found in this chart.    Client Name: Bia Bill  Date: 3/27/2020         Service Type: Individual      Session Start Time: 8:30 AM   session End Time 9:15 AM   session Length: 45     Session #: 54     Attendees: Client attended alone    Treatment Plan Last Reviewed: 11/6/2019  PHQ-9 / CHIN-7 : See flowsheet     DATA    Telemedicine Visit: The patient has been notified of the following:    We have found that certain healthcare needs can be provided without the need for a face-to-face visit.  This service lets us provide the care you need with a phone conversation.    I will have full access to your [place] medical record during this entire phone call.  I will be taking notes for your medical record.    Since this is like an office visit, we will build your insurance company for this service.    There are potential benefits and risks of telephone visits (e.g. limits to patient confidentiality) that differ from in person visits.     Confidentiality still applies for telephone services, and nobody will report the visit.  It is important to be in a quiet, private space that is free of distractions (including cell phone or other devices) during the visit.    If during the course of the call I believe a telephone visit is not appropriate, will you will not be charged for this service.    Consent has been obtained for this service by care team member: Yes       Progress Since Last Session (Related to Symptoms / Goals / Homework):   Symptoms: Improving Client is doing better with maintaining personal boundaries    Homework: Achieved / completed to satisfaction      Episode of Care Goals: Satisfactory progress - ACTION  (Actively working towards change); Intervened by reinforcing change plan / affirming steps taken     Current / Ongoing Stressors and Concerns:  Client reports no hospitalizations since her last visit she is working with diabetic education and INR to balance blood sugar and Jantoven levels.  She is maintaining good boundaries with family and understanding that even though she has to do this she is still loved and capable of loving others. Sister called at 3:30 threatening suicide over a cortes she met online. Manipulative, exploiting sister for money. Client Maintained boundary as much as possible. Called the cortes and threatened to report him. Told sister to grow up.     Treatment Objective(s) Addressed in This Session:   Increase interest, engagement, and pleasure in doing things  Decrease frequency and intensity of feeling down, depressed, hopeless  Improve concentration, focus, and mindfulness in daily activities         Intervention:   CBT: Behavioral activation  processed significant life change, encouraged client to attend diabetic education    ASSESSMENT: Current Emotional / Mental Status (status of significant symptoms):   Risk status (Self / Other harm or suicidal ideation)   Client denies current fears or concerns for personal safety.   Client denies current or recent suicidal ideation or behaviors.   Client denies current or recent homicidal ideation or behaviors.   Client denies current or recent self injurious behavior or ideation.   Client denies other safety concerns.   Recommended that patient call 911 or go to the local ED should there be a change in any of these risk factors.     Appearance:   Phone visit    Eye Contact:   Phone visit    Psychomotor Behavior: Phone visit    Attitude:   Cooperative    Orientation:   All   Speech    Rate / Production: Normal/ Responsive    Volume:  Normal    Mood:    Normal   Affect:    Appropriate    Thought Content:  Clear    Thought Form:  Coherent  Logical     Insight:    Good      Medication Review:   No changes to current psychiatric medication(s)     Medication Compliance:   Yes     Changes in Health Issues:    see above     Chemical Use Review:   Substance Use: Chemical use reviewed, no active concerns identified      Tobacco Use: Client reports she is down to 1 cigarette per day from 2 packs/day.  Diagnosis:  1. Moderate mixed bipolar I disorder (H)         Collateral Reports Completed:   Not Applicable    PLAN: (Client Tasks / Therapist Tasks / Other)  Client to continue With her self-care routine and maintenance of boundaries.   a. Client will Learn to set boundaries when appropriate  b. Client will Identify 2 individuals they can call on for support, distraction    Kd Morris                                                         ________________________________________________________________________    Treatment Plan    Client's Name: Bia Bill  YOB: 1965    Date: 11/6/2019      DSM5 Diagnoses: (Sustained by DSM5 Criteria Listed Above)  Diagnoses: 296.40 Bipolar I Disorder Current or Most Recent Episode Manic, Unspecified  Psychosocial & Contextual Factors: financial difficulties, chronic pain, roommate issues  WHODAS 2.0 (12 item)               This questionnaire asks about difficulties due to health conditions. Health conditions             include disease or illnesses, other health problems that may be short or long lasting,             injuries, mental health or emotional problems, and problems with alcohol or drugs.                         Think back over the past 30 days and answer these questions, thinking about how much             difficulty you had doing the following activities. For each question, please [place] only             one response.      S1  Standing for long periods such as 30 minutes?  Mild =           2    S2  Taking care of household responsibilities?  Moderate =   3    S3  Learning a new task, for example,  learning how to get to a new place?  Mild =           2    S4  How much of a problem do you have joining community activities (for example, festivals, Religion or other activities) in the same way as anyone else can?  Moderate =   3    S5  How much have you been emotionally affected by your health problems?  Mild =           2        In the past 30 days, how much difficulty did you have in:    S6  Concentrating on doing something for ten minutes?  Mild =           2    S7  Walking a long distance such as a kilometer (or equivalent)?  Severe =       4    S8  Washing your whole body?  None =         1    S9  Getting dressed?  None =         1    S10  Dealing with people you do not know?  Moderate =   3    S11  Maintaining a friendship?  Severe =       4    S12  Your day to day work?  Moderate =   3       H1  Overall, in the past 30 days, how many days were these difficulties present?  Record number of days seven    H2  In the past 30 days, for how many days were you totally unable to carry out your usual activities or work because of any health condition?  Record number of days  seven    H3  In the past 30 days, not counting the days that you were totally unable, for how many days did you cut back or reduce your usual activities or work because of any health condition?  Record number of days five                   Referral / Collaboration:  Referral to another professional/service is not indicated at this time..    Anticipated number of session or this episode of care: 60    Goals  2. Education- the Biopsychosocial model of depression  a. Client will be able to describe how depression is effecting them physically, emotionally and socially  3. Behavioral Activation  a. Client will learn to assess their depression on a day to day basis  b. Client will Identify two forms of exercise/activity and engage in them 3 times per week  c. Client will Identify 3 things that make them laugh, and engage in these 5 times per  week.  d. Client will Identify 1-2Creative activities or hobbies  and engage in them 2 times per week  e. Client will identify music, movies, books that make them feel good and use them 3-4 times per week  4. Self-care  a. Client will identify 5 things they can do just for themselves  b. Client will take time for quiet, reflection, meditation 5 times per week  c. Client will Learn to set boundaries when appropriate  d. Client will Identify 2 individuals they can call on for support, distraction  5. Assessment of progress  a. Client will engage in assessment of their progress on a regular basis    Bipolar Disorder  Treatment plan:  1. Education- the Biopsychosocial model of Bipolar Disorder    a. Client will be able to describe in general terms what Bipolar Disorder  is and is not  b. Client will be able to describe how Bipolar Disorder  has affected their life in at least two different areas, such as school or work and Home/relationships  c. Clients parents/guardians or significant others will be provided information on what Bipolar Disorder  is and the ways it can affect relationships and be encouraged to be a part of clients treatment team.  2. Medication  a. Client will participate in medication evaluation for Bipolar Disorder  symptoms and follow medication recommendations.   3. Identification and management of triggers  a. Client and therapist will examine patient s history to determine if there are predictable triggers for manic or depressive episodes (e.g. boredom, anger, family stress)  b. Client and therapist will develop means of diffusing these triggers (e.g. relaxation strategies, boundary setting, anger management)  4. Comorbid conditions   a. Client and therapist will asses for comorbid conditions (e.g. anxiety, depression, substance use). and add additional items to treatment plan as needed  5. Self-care  a. Client will identify 3 things they can do just for themselves  b. Client will take time for  quiet, reflection, meditation 3 times per week  c. Client will Learn to set boundaries when appropriate  d. Client will Identify 2 individuals they can call on for support, distraction  6. Behavioral Activation  a. Client will Identify two forms of exercise/activity and engage in them 3 times per week  b. Client will Identify 2 things that make them laugh, and engage in these 3 times per week.  c. Client will Identify 2 Creative activities or hobbies  and engage in them 2 times per week  d. Client will identify music, movies, books that make them feel good and use them 3 times per week  7. Assessment of progress  a. Client will engage in assessment of their progress on a regular basis    Client has reviewed and agreed to the above plan.      Kd Morris  4/3/2018Disclaimer: Voice recognition software was used to generate this note. As a result, wrong word or 'sound-a-like' substitutions may have occurred due to the inherent limitations of voice recognition software. There may be errors in the script that have gone undetected. Please consider this when interpreting information found in this chart.     Bia Bill is a 54 year old female who is being evaluated via a telephone visit.          Sister called at 3:30 threatening suicide over a cortes she met online. Manipulative, exploiting sister for money. Client Maintained boundary as much as possible. Called the cortes and threatened to report him. Told sister to grow up.      Bia Bill complains of  No chief complaint on file.      DATA:     presenting problem addressed:  Bipolar disorder    interventions provided in call:  Maintaining boundaries, addressed stressors related to current pandemic.    Assessment:     safety concerns; if risk include review of safety plan.  None      change in status since previous visit  Stable    follow through on homework/plan     Plan:   Client to maintain boundaries with family. Also maintain contact with supportive  others as best she can        Assessment/Plan:  (F31.62) Moderate mixed bipolar I disorder (H)  (primary encounter diagnosis)    I have reviewed the note as documented above.  This accurately captures the substance of my conversation with the patient.        Phone call contact time  Call Started at 8:30  Call Ended at 9:15    Kd Morris

## 2020-03-27 NOTE — TELEPHONE ENCOUNTER
ANTICOAGULATION MANAGEMENT     Patient Name:  Bia Bill  Date:  3/27/2020    ASSESSMENT /SUBJECTIVE:      Today's INR result of 2.26 is therapeutic. Goal INR of 2.0-3.0      Warfarin dose taken: Warfarin taken as previously instructed    Diet: No new diet changes affecting INR    Medication changes/ interactions: No new medications/supplements affecting INR    Previous INR: Supratherapeutic     S/S of bleeding or thromboembolism: No    New injury or illness:  No    Upcoming surgery, procedure or cardioversion:  No    Additional findings:       PLAN:    Spoke with Bia regarding INR result and instructed:     Warfarin Dosing Instructions: Continue your current warfarin dose 12.5 mg M, 10 mg ROW    Instructed patient to follow up no later than: 3 weeks  Lab visit scheduled    Education provided: Meron Almazan verbalizes understanding and agrees to warfarin dosing plan.    Instructed to call the Anticoagulation Clinic for any changes, questions or concerns. (#[phone])        OBJECTIVE:  INR   Date Value Ref Range Status   2020 2.26 (H) 0.86 - 1.14 Final             Anticoagulation Summary  As of 3/27/2020    INR goal:   2.0-3.0   TTR:   41.9 % (11.2 mo)   INR used for dosin.26 (3/27/2020)   Warfarin maintenance plan:   12.5 mg (5 mg x 2.5) every Mon; 10 mg (5 mg x 2) all other days   Full warfarin instructions:   12.5 mg every Mon; 10 mg all other days   Weekly warfarin total:   72.5 mg   Plan last modified:   Carmen Way RN (3/6/2020)   Next INR check:   2020   Target end date:   Indefinite    Indications    PVD (peripheral vascular disease) with claudication (H) [I73.9]  Long term current use of anticoagulant therapy (Resolved) [Z79.01]  DVT (deep venous thrombosis) (H) [I82.409]  History of stroke symptoms  stroke ruled out on imaging [Z86.73]             Anticoagulation Episode Summary     INR check location:       Preferred lab:       Send INR reminders to:   ANGI  YONY    Comments:   * lab draw due to elevated hematocrit (fingerstick meters don't work). Needs to have adjusted INR if hct over 55%. LEFT LE. STENT x 3 LEFT UPPER LEG. Previous arterial clot. AS of 10/9/19 Goal range 2.0-3.0.      Anticoagulation Care Providers     Provider Role Specialty Phone number    Kd Leong MD [facility] [phone]

## 2020-04-01 ENCOUNTER — VIRTUAL VISIT (OUTPATIENT)
Dept: FAMILY MEDICINE | Facility: CLINIC | Age: 55
End: 2020-04-01
Payer: COMMERCIAL

## 2020-04-01 DIAGNOSIS — B34.9 VIRAL SYNDROME: Primary | ICD-10-CM

## 2020-04-01 PROCEDURE — 99442 ZZC PHYSICIAN TELEPHONE EVALUATION 11-20 MIN: CPT | Performed by: FAMILY MEDICINE

## 2020-04-01 NOTE — PROGRESS NOTES
"Subjective     Bia Bill is a 54 year old female who is being evaluated via a billable telephone visit.      The patient has been notified of following:     \"This telephone visit will be conducted via a call between you and your physician/provider. We have found that certain health care needs can be provided without the need for a physical exam.  This service lets us provide the care you need with a short phone conversation.  If a prescription is necessary we can send it directly to your pharmacy.  If lab work is needed we can place an order for that and you can then stop by our lab to have the test done at a later time.    If during the course of the call the physician/provider feels a telephone visit is not appropriate, you will not be charged for this service.\"     Patient has given verbal consent for Telephone visit?  Yes    Bia Bill complains of   Chief Complaint   Patient presents with     Headache       ALLERGIES  Darvocet [propoxyphene n-apap]; Percocet [oxycodone-acetaminophen]; Asa [aspirin]; Bee; Ibuprofen; Lyrica [pregabalin]; Povidone iodine; and Tape [adhesive tape]      S: Bia Bill is a 54 year old female with cough.  HA.  Some vomiting.  Feels achy.  This is day 3.      No contacts or travel.  Lives alone  Not working    Complex med hx, some asthma hx but she feels \"sick\" not just wheezy     Using inhalers    Talking OK on phone, not sob, minimal cough on phone    A: respiratory illness, covid suspected     P: self quarantine.  Doesn't have internet, so getwell loop not possible    7 days minimum, plus 3 days of fever free and improving cough.      She is aware of above, and knows to seek addictional care in [place] if breathing becomes difficult or illness substantially worsens.    "

## 2020-04-06 ENCOUNTER — PATIENT OUTREACH (OUTPATIENT)
Dept: CARE COORDINATION | Facility: CLINIC | Age: 55
End: 2020-04-06

## 2020-04-06 NOTE — PROGRESS NOTES
Clinic Care Coordination Contact    Situation: Patient chart reviewed by care coordinator.    Background: CC identified  Pt as candidate for CCC based on IHP spreadsheet.     Assessment: Pt is currently enrolled in CC with RN. Will offer additional  support as needed.    Plan/Recommendations: Pt to continue CC with RN.      JESUS Castro     [facility] Care Coordination  [facility]  Tel: [phone]

## 2020-04-08 ENCOUNTER — PATIENT OUTREACH (OUTPATIENT)
Dept: CARE COORDINATION | Facility: CLINIC | Age: 55
End: 2020-04-08

## 2020-04-08 DIAGNOSIS — I89.0 LYMPHEDEMA: ICD-10-CM

## 2020-04-08 RX ORDER — POTASSIUM CHLORIDE 750 MG/1
TABLET, EXTENDED RELEASE ORAL
Qty: 90 TABLET | Refills: 2 | Status: ON HOLD | OUTPATIENT
Start: 2020-04-08 | End: 2021-01-03

## 2020-04-08 NOTE — PROGRESS NOTES
"Clinic Care Coordination Contact    Follow Up Progress Note      Assessment: Patient calling clinic care coordinator.     Patient states \"I think I have the virus\".   Patient denies fever, has not checked her temperature. No chills, sweats, etc.    Patient has a productive cough, this is her baseline with COPD. She states sputum is brown, this is her norm as well.    She feels short of breath. Patient is able to speak in full sentences without cough or shortness of breath or wheezing. Patient has sounded much worse during past exacerbations.      Patient states she continues with edema in lower extremities. She states she had a blister on the left leg, this burst and now continues to weep. It is not red or painful. She denies symptoms of infection at this time.     Patient states she started feeling \"crummy\" after she went to the grocery store a week and a half ago. She has not been out of the house or had contact with anyone else since that time.     Goals addressed this encounter:   Goals Addressed                 This Visit's Progress      #1 Medical (pt-stated)   On track     Goal Statement: I want my cellulitis to heal.  Date goal set: 12/5/2019   Measure of Success: Cellulitis will be healed.  Barriers: current infection  Strengths: care coordination, lymphedema therapy  Date to Achieve By: July 2020   Patient expressed understanding of goal: yes    Action steps to achieve this goal  1. I will take and finish my antibiotics as prescribed  2. I will elevate my legs as much as possible for the first 48 hours  3. I will follow up with PCP as scheduled         #2 Medical (pt-stated)   On track     Goal Statement: I want my shortness of breath to improve    Date goal set: 11/1/19    Measure of Success: When I am not short of breath    Barriers: COPD    Strengths: Motivated    Date to Achieve By: July2020    Patient expressed understanding of goal: Yes    Action steps to achieve this goal  1. I will get my nebulizer " fixed - got new machine 1/2020  2. I will use nebulizer as instructed  3. I will see PCP  as scheduled  4. I will use my asthma action plan         #3 Improve chronic symptoms (pt-stated)   30%     Goal Statement: I want my edema to improve.  Date goal set: 12/5/2019   Measure of Success: I will be able to put support stockings on by myself.  Barriers: lymphedema  Strengths: care coordination, lymphedema clinic  Date to Achieve By: 7/1/20  Patient expressed understanding of goal: yes    Action steps to achieve this goal  1. I will elevate my legs throughout the day  2. I will attend lymphedema clinic appointments  3. I will take my diuretics as prescribed         #4 Monitoring (pt-stated)   50%     Goal Statement: I will weigh myself every day.  Date goal set: 12/5/2019   Measure of Success: Consistent record of daily weights.  Barriers: Does not like scale  Strengths: care coordination support  Date to Achieve By: July 2020  Patient expressed understanding of goal: yes    Action steps to achieve this goal  1. I will weigh myself every morning.  2. I will write my weight down in a notebook.  3. I will take an extra 20-40 mg of Lasix if my weight increases by 3 lbs.         Goals are stable, open area on left leg currently. Close monitoring.      Intervention/Education provided during outreach: Advised patient to take and track her temperature. If her shortness of breath or cough get worse she should contact PCP clinic or proceed to ED if life threatening.   Patient will monitor her leg for signs of infection and call PCP clinic if symptomatic.     Reassurance and listening for concerns of covid-19 virus.      Outreach Frequency: monthly    Plan:   Patient will monitor symptoms and call if worsening.    Care Coordinator will follow up in one month.     Michela Whitaker RN, [facility] RN Coordinator  [facility]   4/8/2020    10:25 AM  [phone]

## 2020-04-09 ENCOUNTER — VIRTUAL VISIT (OUTPATIENT)
Dept: PSYCHOLOGY | Facility: CLINIC | Age: 55
End: 2020-04-09
Payer: COMMERCIAL

## 2020-04-09 DIAGNOSIS — F31.62 MODERATE MIXED BIPOLAR I DISORDER (H): Primary | ICD-10-CM

## 2020-04-09 PROCEDURE — 90834 PSYTX W PT 45 MINUTES: CPT | Mod: 95 | Performed by: PSYCHOLOGIST

## 2020-04-09 NOTE — PROGRESS NOTES
Progress Note  Disclaimer: This note consists of symbols derived from keyboarding, dictation and/or voice recognition software. As a result, there may be errors in the script that have gone undetected. Please consider this when interpreting information found in this chart.    Client Name: Bia Bill  Date: 4/9/2020         Service Type: Individual      Session Start Time: 9:30 AM   session End Time 10:15 AM   session Length: 45     Session #: 55     Attendees: Client attended alone    Treatment Plan Last Reviewed: 11/6/2019  PHQ-9 / CHIN-7 : See flowsheet     DATA    Telemedicine Visit: The patient has been notified of the following:    We have found that certain healthcare needs can be provided without the need for a face-to-face visit.  This service lets us provide the care you need with a phone conversation.    I will have full access to your [place] medical record during this entire phone call.  I will be taking notes for your medical record.    Since this is like an office visit, we will build your insurance company for this service.    There are potential benefits and risks of telephone visits (e.g. limits to patient confidentiality) that differ from in person visits.     Confidentiality still applies for telephone services, and nobody will report the visit.  It is important to be in a quiet, private space that is free of distractions (including cell phone or other devices) during the visit.    If during the course of the call I believe a telephone visit is not appropriate, will you will not be charged for this service.    Consent has been obtained for this service by care team member: Yes       Progress Since Last Session (Related to Symptoms / Goals / Homework):   Symptoms: Improving Client is doing better with maintaining personal boundaries    Homework: Achieved / completed to satisfaction      Episode of Care Goals: Satisfactory progress - ACTION  (Actively working towards change); Intervened by reinforcing change plan / affirming steps taken     Current / Ongoing Stressors and Concerns:  Reporting Difficulty breathing. MD told her to treat it like Covid. Isolating. If she develops fever go to ED. Sewing, cleaning the house to keep occupied. Talks to sister almost daily. Sister just got . Still having online, questionable relationship.     Treatment Objective(s) Addressed in This Session:   Increase interest, engagement, and pleasure in doing things  Decrease frequency and intensity of feeling down, depressed, hopeless  Improve concentration, focus, and mindfulness in daily activities         Intervention:   CBT: Behavioral activation  processed significant life change, encouraged client in her self-care procedures.    ASSESSMENT: Current Emotional / Mental Status (status of significant symptoms):   Risk status (Self / Other harm or suicidal ideation)   Client denies current fears or concerns for personal safety.   Client denies current or recent suicidal ideation or behaviors.   Client denies current or recent homicidal ideation or behaviors.   Client denies current or recent self injurious behavior or ideation.   Client denies other safety concerns.   Recommended that patient call 911 or go to the local ED should there be a change in any of these risk factors.     Appearance:   Phone visit    Eye Contact:   Phone visit    Psychomotor Behavior: Phone visit    Attitude:   Cooperative    Orientation:   All   Speech    Rate / Production: Normal/ Responsive    Volume:  Normal    Mood:    Normal   Affect:    Appropriate    Thought Content:  Clear    Thought Form:  Coherent  Logical    Insight:    Good      Medication Review:   No changes to current psychiatric medication(s)     Medication Compliance:   Yes     Changes in Health Issues:    see above     Chemical Use Review:   Substance Use: Chemical use reviewed, no active concerns identified      Tobacco  Use: Client reports she is down to 1 cigarette per day from 2 packs/day.  Diagnosis:  1. Moderate mixed bipolar I disorder (H)         Collateral Reports Completed:   Not Applicable    PLAN: (Client Tasks / Therapist Tasks / Other)  Client to continue With her self-care routine and maintenance of boundaries.   a. Client will Learn to set boundaries when appropriate  b. Client will Identify 2 individuals they can call on for support, distraction    Kd Morris                                                         ________________________________________________________________________    Treatment Plan    Client's Name: Bia Bill  YOB: 1965    Date: 11/6/2019      DSM5 Diagnoses: (Sustained by DSM5 Criteria Listed Above)  Diagnoses: 296.40 Bipolar I Disorder Current or Most Recent Episode Manic, Unspecified  Psychosocial & Contextual Factors: financial difficulties, chronic pain, roommate issues  WHODAS 2.0 (12 item)               This questionnaire asks about difficulties due to health conditions. Health conditions             include disease or illnesses, other health problems that may be short or long lasting,             injuries, mental health or emotional problems, and problems with alcohol or drugs.                         Think back over the past 30 days and answer these questions, thinking about how much             difficulty you had doing the following activities. For each question, please [place] only             one response.      S1  Standing for long periods such as 30 minutes?  Mild =           2    S2  Taking care of household responsibilities?  Moderate =   3    S3  Learning a new task, for example, learning how to get to a new place?  Mild =           2    S4  How much of a problem do you have joining community activities (for example, festivals, Sikh or other activities) in the same way as anyone else can?  Moderate =   3    S5  How much have you been emotionally  affected by your health problems?  Mild =           2        In the past 30 days, how much difficulty did you have in:    S6  Concentrating on doing something for ten minutes?  Mild =           2    S7  Walking a long distance such as a kilometer (or equivalent)?  Severe =       4    S8  Washing your whole body?  None =         1    S9  Getting dressed?  None =         1    S10  Dealing with people you do not know?  Moderate =   3    S11  Maintaining a friendship?  Severe =       4    S12  Your day to day work?  Moderate =   3       H1  Overall, in the past 30 days, how many days were these difficulties present?  Record number of days seven    H2  In the past 30 days, for how many days were you totally unable to carry out your usual activities or work because of any health condition?  Record number of days  seven    H3  In the past 30 days, not counting the days that you were totally unable, for how many days did you cut back or reduce your usual activities or work because of any health condition?  Record number of days five                   Referral / Collaboration:  Referral to another professional/service is not indicated at this time..    Anticipated number of session or this episode of care: 60    Goals  2. Education- the Biopsychosocial model of depression  a. Client will be able to describe how depression is effecting them physically, emotionally and socially  3. Behavioral Activation  a. Client will learn to assess their depression on a day to day basis  b. Client will Identify two forms of exercise/activity and engage in them 3 times per week  c. Client will Identify 3 things that make them laugh, and engage in these 5 times per week.  d. Client will Identify 1-2Creative activities or hobbies  and engage in them 2 times per week  e. Client will identify music, movies, books that make them feel good and use them 3-4 times per week  4. Self-care  a. Client will identify 5 things they can do just for  themselves  b. Client will take time for quiet, reflection, meditation 5 times per week  c. Client will Learn to set boundaries when appropriate  d. Client will Identify 2 individuals they can call on for support, distraction  5. Assessment of progress  a. Client will engage in assessment of their progress on a regular basis    Bipolar Disorder  Treatment plan:  1. Education- the Biopsychosocial model of Bipolar Disorder    a. Client will be able to describe in general terms what Bipolar Disorder  is and is not  b. Client will be able to describe how Bipolar Disorder  has affected their life in at least two different areas, such as school or work and Home/relationships  c. Clients parents/guardians or significant others will be provided information on what Bipolar Disorder  is and the ways it can affect relationships and be encouraged to be a part of clients treatment team.  2. Medication  a. Client will participate in medication evaluation for Bipolar Disorder  symptoms and follow medication recommendations.   3. Identification and management of triggers  a. Client and therapist will examine patient s history to determine if there are predictable triggers for manic or depressive episodes (e.g. boredom, anger, family stress)  b. Client and therapist will develop means of diffusing these triggers (e.g. relaxation strategies, boundary setting, anger management)  4. Comorbid conditions   a. Client and therapist will asses for comorbid conditions (e.g. anxiety, depression, substance use). and add additional items to treatment plan as needed  5. Self-care  a. Client will identify 3 things they can do just for themselves  b. Client will take time for quiet, reflection, meditation 3 times per week  c. Client will Learn to set boundaries when appropriate  d. Client will Identify 2 individuals they can call on for support, distraction  6. Behavioral Activation  a. Client will Identify two forms of exercise/activity and  engage in them 3 times per week  b. Client will Identify 2 things that make them laugh, and engage in these 3 times per week.  c. Client will Identify 2 Creative activities or hobbies  and engage in them 2 times per week  d. Client will identify music, movies, books that make them feel good and use them 3 times per week  7. Assessment of progress  a. Client will engage in assessment of their progress on a regular basis    Client has reviewed and agreed to the above plan.      Kd Morris  4/3/2018Disclaimer: Voice recognition software was used to generate this note. As a result, wrong word or 'sound-a-like' substitutions may have occurred due to the inherent limitations of voice recognition software. There may be errors in the script that have gone undetected. Please consider this when interpreting information found in this chart.     Bia Bill is a 54 year old female who is being evaluated via a telephone visit.          Sister called at 3:30 threatening suicide over a cortes she met online. Manipulative, exploiting sister for money. Client Maintained boundary as much as possible. Called the cortes and threatened to report him. Told sister to grow up.      Bia Bill complains of  No chief complaint on file.      DATA:     presenting problem addressed:  Bipolar disorder    interventions provided in call:  Maintaining boundaries, addressed stressors related to current pandemic.    Assessment:     safety concerns; if risk include review of safety plan.  None      change in status since previous visit  Stable    follow through on homework/plan     Plan:   Client to maintain boundaries with family. Also maintain contact with supportive others as best she can        Assessment/Plan:  (F31.62) Moderate mixed bipolar I disorder (H)  (primary encounter diagnosis)    I have reviewed the note as documented above.  This accurately captures the substance of my conversation with the patient.        Phone call  contact time  Call Started at 8:30  Call Ended at 9:15    Kd Morris

## 2020-04-16 ENCOUNTER — TELEPHONE (OUTPATIENT)
Dept: FAMILY MEDICINE | Facility: CLINIC | Age: 55
End: 2020-04-16

## 2020-04-16 NOTE — TELEPHONE ENCOUNTER
Patient has INR appt tomorrow with lab however has symptoms of Covid and was around a person who had Covid symptoms.  RN called INR clinic, cancel appt, stay on same dose, call with bleeding concerns.  Patient notified and will call and make new appt after 7 days symptom free.  Sharmila KAUFFMAN RN BSN

## 2020-04-23 ENCOUNTER — VIRTUAL VISIT (OUTPATIENT)
Dept: PSYCHOLOGY | Facility: CLINIC | Age: 55
End: 2020-04-23
Payer: COMMERCIAL

## 2020-04-23 DIAGNOSIS — F31.62 MODERATE MIXED BIPOLAR I DISORDER (H): Primary | ICD-10-CM

## 2020-04-23 PROCEDURE — 99207 ZZC NO CHARGE VISIT/PATIENT NOT SEEN: CPT | Mod: 95 | Performed by: PSYCHOLOGIST

## 2020-04-28 ENCOUNTER — VIRTUAL VISIT (OUTPATIENT)
Dept: PSYCHOLOGY | Facility: CLINIC | Age: 55
End: 2020-04-28
Payer: COMMERCIAL

## 2020-04-28 DIAGNOSIS — F31.62 MODERATE MIXED BIPOLAR I DISORDER (H): Primary | ICD-10-CM

## 2020-04-28 PROCEDURE — 90834 PSYTX W PT 45 MINUTES: CPT | Mod: 95 | Performed by: PSYCHOLOGIST

## 2020-04-28 NOTE — PROGRESS NOTES
Progress Note  Disclaimer: This note consists of symbols derived from keyboarding, dictation and/or voice recognition software. As a result, there may be errors in the script that have gone undetected. Please consider this when interpreting information found in this chart.    Client Name: Bia Bill  Date: 4/28/2020         Service Type: Individual      Session Start Time: 10:30 AM   session End Time 11:15 AM   session Length: 45     Session #: 56     Attendees: Client attended alone    Treatment Plan Last Reviewed: 4/28/2020  PHQ-9 / CHIN-7 : See flowsheet     DATA    Telemedicine Visit: The patient has been notified of the following:    We have found that certain healthcare needs can be provided without the need for a face-to-face visit.  This service lets us provide the care you need with a phone conversation.    I will have full access to your [facility] medical record during this entire phone call.  I will be taking notes for your medical record.    Since this is like an office visit, we will build your insurance company for this service.    There are potential benefits and risks of telephone visits (e.g. limits to patient confidentiality) that differ from in person visits.     Confidentiality still applies for telephone services, and nobody will report the visit.  It is important to be in a quiet, private space that is free of distractions (including cell phone or other devices) during the visit.    If during the course of the call I believe a telephone visit is not appropriate, will you will not be charged for this service.    Consent has been obtained for this service by care team member: Yes       Progress Since Last Session (Related to Symptoms / Goals / Homework):   Symptoms: Improving Client is doing better with maintaining personal boundaries    Homework: Achieved / completed to satisfaction      Episode of Care Goals: Satisfactory progress - ACTION  (Actively working towards change); Intervened by reinforcing change plan / affirming steps taken     Current / Ongoing Stressors and Concerns:  Client is concerned about her daughter having access to her financial information dt having to help with taxes.      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 Try to spend as much time as possible outside.    ASSESSMENT: Current Emotional / Mental Status (status of significant symptoms):   Risk status (Self / Other harm or suicidal ideation)   Client denies current fears or concerns for personal safety.   Client denies current or recent suicidal ideation or behaviors.   Client denies current or recent homicidal ideation or behaviors.   Client denies current or recent self injurious behavior or ideation.   Client denies other safety concerns.   Recommended that patient call 911 or go to the local ED should there be a change in any of these risk factors.     Appearance:   Phone visit    Eye Contact:   Phone visit    Psychomotor Behavior: Phone visit    Attitude:   Cooperative    Orientation:   All   Speech    Rate / Production: Normal/ Responsive    Volume:  Normal    Mood:    Normal   Affect:    Appropriate    Thought Content:  Clear    Thought Form:  Coherent  Logical    Insight:    Good      Medication Review:   No changes to current psychiatric medication(s)     Medication Compliance:   Yes     Changes in Health Issues:    see above     Chemical Use Review:   Substance Use: Chemical use reviewed, no active concerns identified      Tobacco Use: Client reports she is down to 1 cigarette per day from 2 packs/day.  Diagnosis:  1. Moderate mixed bipolar I  disorder (H)         Collateral Reports Completed:   Not Applicable    PLAN: (Client Tasks / Therapist Tasks / Other)  Client to continue With her self-care routine and maintenance of boundaries.   a. Client will Learn to set boundaries when appropriate  b. Client will Identify 2 individuals they can call on for support, distraction    Kd Morris                                                         ________________________________________________________________________    Treatment Plan    Client's Name: Bia Bill  YOB: 1965    Date: 4/28/2020      DSM5 Diagnoses: (Sustained by DSM5 Criteria Listed Above)  Diagnoses: 296.40 Bipolar I Disorder Current or Most Recent Episode Manic, Unspecified  Psychosocial & Contextual Factors: financial difficulties, chronic pain, roommate issues  WHODAS 2.0 (12 item)               This questionnaire asks about difficulties due to health conditions. Health conditions             include disease or illnesses, other health problems that may be short or long lasting,             injuries, mental health or emotional problems, and problems with alcohol or drugs.                         Think back over the past 30 days and answer these questions, thinking about how much             difficulty you had doing the following activities. For each question, please [place] only             one response.      S1  Standing for long periods such as 30 minutes?  Mild =           2    S2  Taking care of household responsibilities?  Moderate =   3    S3  Learning a new task, for example, learning how to get to a new place?  Mild =           2    S4  How much of a problem do you have joining community activities (for example, festivals, [employer] or other activities) in the same way as anyone else can?  Moderate =   3    S5  How much have you been emotionally affected by your health problems?  Mild =           2        In the past 30 days, how much difficulty did you have  in:    S6  Concentrating on doing something for ten minutes?  Mild =           2    S7  Walking a long distance such as a kilometer (or equivalent)?  Severe =       4    S8  Washing your whole body?  None =         1    S9  Getting dressed?  None =         1    S10  Dealing with people you do not know?  Moderate =   3    S11  Maintaining a friendship?  Severe =       4    S12  Your day to day work?  Moderate =   3       H1  Overall, in the past 30 days, how many days were these difficulties present?  Record number of days seven    H2  In the past 30 days, for how many days were you totally unable to carry out your usual activities or work because of any health condition?  Record number of days  seven    H3  In the past 30 days, not counting the days that you were totally unable, for how many days did you cut back or reduce your usual activities or work because of any health condition?  Record number of days five                   Referral / Collaboration:  Referral to another professional/service is not indicated at this time..    Anticipated number of session or this episode of care: 60    Goals  2. Education- the Biopsychosocial model of depression  a. Client will be able to describe how depression is effecting them physically, emotionally and socially  3. Behavioral Activation  a. Client will learn to assess their depression on a day to day basis  b. Client will Identify two forms of exercise/activity and engage in them 3 times per week  c. Client will Identify 3 things that make them laugh, and engage in these 5 times per week.  d. Client will Identify 1-2Creative activities or hobbies  and engage in them 2 times per week  e. Client will identify music, movies, books that make them feel good and use them 3-4 times per week  4. Self-care  a. Client will identify 5 things they can do just for themselves  b. Client will take time for quiet, reflection, meditation 5 times per week  c. Client will Learn to set  boundaries when appropriate  d. Client will Identify 2 individuals they can call on for support, distraction  5. Assessment of progress  a. Client will engage in assessment of their progress on a regular basis    Bipolar Disorder  Treatment plan:  1. Education- the Biopsychosocial model of Bipolar Disorder    a. Client will be able to describe in general terms what Bipolar Disorder  is and is not  b. Client will be able to describe how Bipolar Disorder  has affected their life in at least two different areas, such as school or work and Home/relationships  c. Clients parents/guardians or significant others will be provided information on what Bipolar Disorder  is and the ways it can affect relationships and be encouraged to be a part of clients treatment team.  2. Medication  a. Client will participate in medication evaluation for Bipolar Disorder  symptoms and follow medication recommendations.   3. Identification and management of triggers  a. Client and therapist will examine patient s history to determine if there are predictable triggers for manic or depressive episodes (e.g. boredom, anger, family stress)  b. Client and therapist will develop means of diffusing these triggers (e.g. relaxation strategies, boundary setting, anger management)  4. Comorbid conditions   a. Client and therapist will asses for comorbid conditions (e.g. anxiety, depression, substance use). and add additional items to treatment plan as needed  5. Self-care  a. Client will identify 3 things they can do just for themselves  b. Client will take time for quiet, reflection, meditation 3 times per week  c. Client will Learn to set boundaries when appropriate  d. Client will Identify 2 individuals they can call on for support, distraction  6. Behavioral Activation  a. Client will Identify two forms of exercise/activity and engage in them 3 times per week  b. Client will Identify 2 things that make them laugh, and engage in these 3 times per  week.  c. Client will Identify 2 Creative activities or hobbies  and engage in them 2 times per week  d. Client will identify music, movies, books that make them feel good and use them 3 times per week  7. Assessment of progress  a. Client will engage in assessment of their progress on a regular basis    Client has reviewed and agreed to the above plan.      Kd Morris  4/28/2020

## 2020-04-30 ENCOUNTER — TELEPHONE (OUTPATIENT)
Dept: FAMILY MEDICINE | Facility: CLINIC | Age: 55
End: 2020-04-30

## 2020-04-30 NOTE — TELEPHONE ENCOUNTER
Bia Bill is overdue for INR check.      Spoke with Norah and scheduled INR appointment on 05/11/20     Tara Rivas RN

## 2020-05-04 ENCOUNTER — ALLIED HEALTH/NURSE VISIT (OUTPATIENT)
Dept: EDUCATION SERVICES | Facility: CLINIC | Age: 55
End: 2020-05-04
Payer: COMMERCIAL

## 2020-05-04 DIAGNOSIS — E11.9 TYPE 2 DIABETES MELLITUS (H): Primary | ICD-10-CM

## 2020-05-04 PROCEDURE — 98968 PH1 ASSMT&MGMT NQHP 21-30: CPT

## 2020-05-04 NOTE — PROGRESS NOTES
"Diabetes Self-Management Education & Support  Presents for: Individual review.  Patient verbally consented to the telephone visit service today: yes    SUBJECTIVE/OBJECTIVE:  Presents for: Individual review  Diabetes education in the past 24mo: Yes  Focus of Visit: Monitoring, Taking Medication, Reducing Risks  Diabetes type: Type 2  Disease course: Improving  Other concerns:: None  Cultural Influences/Ethnic Background:  American    Diabetes Symptoms & Complications:  Not assessed at this visit     Patient Problem List and Family Medical History reviewed for relevant medical history, current medical status, and diabetes risk factors.    Vitals:  LMP 09/27/2009   Estimated body mass index is 48.45 kg/m  as calculated from the following:    Height as of 1/30/20: 1.473 m (4' 10\").    Weight as of 1/30/20: 105.1 kg (231 lb 12.8 oz).   Last 3 BP:   BP Readings from Last 3 Encounters:   01/30/20 122/64   01/29/20 139/63   01/23/20 (!) 143/60       History   Smoking Status     Current Every Day Smoker     Packs/day: 0.50     Years: 43.00     Types: Cigarettes   Smokeless Tobacco     Never Used     Comment: started at age 11.  Quit during pregnancyX4.  Quit 3 months ago.        Labs:  Lab Results   Component Value Date    A1C 7.2 01/23/2020     Lab Results   Component Value Date     01/23/2020     Lab Results   Component Value Date    LDL 48 09/27/2019     HDL Cholesterol   Date Value Ref Range Status   09/27/2019 31 (L) >49 mg/dL Final   ]  GFR Estimate   Date Value Ref Range Status   01/23/2020 >90 >60 mL/min/[1.73_m2] Final     Comment:     Non  GFR Calc  Starting 12/18/2018, serum creatinine based estimated GFR (eGFR) will be   calculated using the Chronic Kidney Disease Epidemiology Collaboration   (CKD-EPI) equation.       GFR Estimate If Black   Date Value Ref Range Status   01/23/2020 >90 >60 mL/min/[1.73_m2] Final     Comment:      GFR Calc  Starting 12/18/2018, serum " creatinine based estimated GFR (eGFR) will be   calculated using the Chronic Kidney Disease Epidemiology Collaboration   (CKD-EPI) equation.       Lab Results   Component Value Date    CR 0.66 01/23/2020     No results found for: MICROALBUMIN    Patient's most recent  is not meeting goal of <7.0  Lab Results   Component Value Date    A1C 7.2 01/23/2020    A1C 6.1 10/29/2019    A1C 6.2 12/29/2016    A1C 5.8 09/10/2015    A1C 5.4 07/10/2014         Healthy Eating:  Healthy Eating Assessed Today: Yes  Meal planning/habits: Avoiding sweets, Carb counting  Meals include: Breakfast, Lunch, Dinner  Breakfast: egg sandwich or cereal. Has been able to not have sugar in coffee  Dinner: smaller portions at dinner  Other: has been able to stay off diet pepsi and just doing water.    Being Active:  Being Active Assessed Today: Yes  Exercise:: Yes(has been doing more house projects & cleaning the house)  How intense was your typical exercise? : Moderate (like brisk walking)    Monitoring:  Monitoring Assessed Today: Yes  Times checking blood sugar at home (number): 2  Times checking blood sugar at home (per): Day    Per report - highest 208 (when sick, thinks she had COVID and is recovered now), lowest 93.  After eating 140, 106     Taking Medications:  Diabetes Medication(s)     Biguanides       metFORMIN (GLUCOPHAGE) 1000 MG tablet    Take 1 tablet (1,000 mg) by mouth 2 times daily (with meals)        Taking Medication Assessed Today: Yes  Current Treatments: Diet, Oral Medication (taken by mouth)    Problem Solving:   Not assessed at this visit     Reducing Risks:  Reducing Risks Assessed Today: Yes  Diabetes Risks: Age over 45 years, Hypertriglyceridemia, Family History  CAD Risks: Diabetes Mellitus, Dyslipidemia, Family history, Obesity  Has dilated eye exam at least once a year?: Yes  Sees dentist every 6 months?: (has dentures - no gum problems)  Feet checked by healthcare provider in the last year?: No    Healthy  Coping:  Action     Patient Activation Measure Survey Score:  LEONA Score (Last Two) 1/12/2011   LEONA Raw Score 45   Activation Score 73.1   LEONA Level 4       Diabetes knowledge and skills assessment:   Patient is knowledgeable in diabetes management concepts related to: Healthy Eating, Being Active, Monitoring, Taking Medication and Reducing Risks  Patient needs further education on the following diabetes management concepts: Problem Solving and Healthy Coping  Based on learning assessment above, most appropriate setting for further diabetes education would be: Individual setting.      INTERVENTIONS/ASSESSMENT:  Continued AADE7 education for new diagnosis.  Patient reports doing well with diet, activity and monitoring.  Blood sugars per report sound to be in target, except for when she was ill with a fever.  Reviewed reducing risks and sick days.   Patient smokes and reports reducing the amount significantly since being sick; is weaning down and considering quitting.     Education provided today on:  AADE Self-Care Behaviors:  Diabetes Pathophysiology  Healthy Eating: consistency in amount, composition, and timing of food intake and portion control  Being Active: relationship to blood glucose and describe appropriate activity program  Monitoring: log and interpret results  Problem Solving: sick day arrangements  Reducing Risks: major complications of diabetes, prevention, early diagnostic measures and treatment of complications, foot care, appropriate dental care, annual eye exam, smoking cessation, aspirin therapy, A1C - goals, relating to blood glucose levels, how often to check, lipids levels and goals and blood pressure and goals    Opportunities for ongoing education and support in diabetes-self management were discussed.  Pt verbalized understanding of concepts discussed and recommendations provided today.       Education Materials Provided:  goals of care      PLAN  See Patient Instructions for co-developed,  patient-stated behavior change goals.  AVS printed and provided to patient today. See Follow-Up section for recommended follow-up.  Set follow up 6/15 to finish education (double check blood sugar ranges, healthy coping, problem solving)     Bia Waters RD, LD, CDE  Diabetes Education    Time Spent: 30 minutes  Encounter Type: Individual

## 2020-05-07 ENCOUNTER — PATIENT OUTREACH (OUTPATIENT)
Dept: CARE COORDINATION | Facility: CLINIC | Age: 55
End: 2020-05-07

## 2020-05-07 ENCOUNTER — VIRTUAL VISIT (OUTPATIENT)
Dept: PSYCHOLOGY | Facility: CLINIC | Age: 55
End: 2020-05-07
Payer: COMMERCIAL

## 2020-05-07 DIAGNOSIS — F31.62 MODERATE MIXED BIPOLAR I DISORDER (H): Primary | ICD-10-CM

## 2020-05-07 PROCEDURE — 90832 PSYTX W PT 30 MINUTES: CPT | Mod: 95 | Performed by: PSYCHOLOGIST

## 2020-05-07 NOTE — PROGRESS NOTES
Progress Note  Disclaimer: This note consists of symbols derived from keyboarding, dictation and/or voice recognition software. As a result, there may be errors in the script that have gone undetected. Please consider this when interpreting information found in this chart.    Client Name: Bia Bill  Date: 5/72020         Service Type: Individual      Session Start Time: 8:30 AM   session End Time 9:00 AM   session Length: 30     Session #: 57     Attendees: Client attended alone    Treatment Plan Last Reviewed: 4/28/2020  PHQ-9 / CHIN-7 : See flowsheet     DATA    Telemedicine Visit: The patient has been notified of the following:    We have found that certain healthcare needs can be provided without the need for a face-to-face visit.  This service lets us provide the care you need with a phone conversation.    I will have full access to your [facility] medical record during this entire phone call.  I will be taking notes for your medical record.    Since this is like an office visit, we will build your insurance company for this service.    There are potential benefits and risks of telephone visits (e.g. limits to patient confidentiality) that differ from in person visits.     Confidentiality still applies for telephone services, and nobody will report the visit.  It is important to be in a quiet, private space that is free of distractions (including cell phone or other devices) during the visit.    If during the course of the call I believe a telephone visit is not appropriate, will you will not be charged for this service.    Consent has been obtained for this service by care team member: Yes       Progress Since Last Session (Related to Symptoms / Goals / Homework):   Symptoms: Worsening dt clients sister's illness    Homework: Achieved / completed to satisfaction      Episode of Care Goals: Satisfactory progress - ACTION (Actively working towards change);  Intervened by reinforcing change plan / affirming steps taken     Current / Ongoing Stressors and Concerns:  Sister is in the hospital with Covid. Hospitalized at [facility]. Daughter fraudulently used her credit card.     Treatment Objective(s) Addressed in This Session:   Increase interest, engagement, and pleasure in doing things  Decrease frequency and intensity of feeling down, depressed, hopeless  Improve concentration, focus, and mindfulness in daily activities         Intervention:   CBT: Behavioral activation    ASSESSMENT: Current Emotional / Mental Status (status of significant symptoms):   Risk status (Self / Other harm or suicidal ideation)   Client denies current fears or concerns for personal safety.   Client denies current or recent suicidal ideation or behaviors.   Client denies current or recent homicidal ideation or behaviors.   Client denies current or recent self injurious behavior or ideation.   Client denies other safety concerns.   Recommended that patient call 911 or go to the local ED should there be a change in any of these risk factors.     Appearance:   Phone visit    Eye Contact:   Phone visit    Psychomotor Behavior: Phone visit    Attitude:   Cooperative    Orientation:   All   Speech    Rate / Production: Normal/ Responsive    Volume:  Normal    Mood:    Normal   Affect:    Appropriate    Thought Content:  Clear    Thought Form:  Coherent  Logical    Insight:    Good      Medication Review:   No changes to current psychiatric medication(s)     Medication Compliance:   Yes     Changes in Health Issues:    see above     Chemical Use Review:   Substance Use: Chemical use reviewed, no active concerns identified      Tobacco Use: Client reports she is down to 1 cigarette per day from 2 packs/day.  Diagnosis:  1. Moderate mixed bipolar I disorder (H)         Collateral Reports Completed:   Not Applicable    PLAN: (Client Tasks / Therapist Tasks / Other)  Client to continue With her self-care  routine and maintenance of boundaries.   a. Client will Learn to set boundaries when appropriate  b. Client will Identify 2 individuals they can call on for support, distraction    Kd Morris                                                         ________________________________________________________________________    Treatment Plan    Client's Name: Bia Bill  YOB: 1965    Date: 4/28/2020      DSM5 Diagnoses: (Sustained by DSM5 Criteria Listed Above)  Diagnoses: 296.40 Bipolar I Disorder Current or Most Recent Episode Manic, Unspecified  Psychosocial & Contextual Factors: financial difficulties, chronic pain, roommate issues  WHODAS 2.0 (12 item)               This questionnaire asks about difficulties due to health conditions. Health conditions             include disease or illnesses, other health problems that may be short or long lasting,             injuries, mental health or emotional problems, and problems with alcohol or drugs.                         Think back over the past 30 days and answer these questions, thinking about how much             difficulty you had doing the following activities. For each question, please [place] only             one response.      S1  Standing for long periods such as 30 minutes?  Mild =           2    S2  Taking care of household responsibilities?  Moderate =   3    S3  Learning a new task, for example, learning how to get to a new place?  Mild =           2    S4  How much of a problem do you have joining community activities (for example, festivals, Advent or other activities) in the same way as anyone else can?  Moderate =   3    S5  How much have you been emotionally affected by your health problems?  Mild =           2        In the past 30 days, how much difficulty did you have in:    S6  Concentrating on doing something for ten minutes?  Mild =           2    S7  Walking a long distance such as a kilometer (or equivalent)?  Severe  =       4    S8  Washing your whole body?  None =         1    S9  Getting dressed?  None =         1    S10  Dealing with people you do not know?  Moderate =   3    S11  Maintaining a friendship?  Severe =       4    S12  Your day to day work?  Moderate =   3       H1  Overall, in the past 30 days, how many days were these difficulties present?  Record number of days seven    H2  In the past 30 days, for how many days were you totally unable to carry out your usual activities or work because of any health condition?  Record number of days  seven    H3  In the past 30 days, not counting the days that you were totally unable, for how many days did you cut back or reduce your usual activities or work because of any health condition?  Record number of days five                   Referral / Collaboration:  Referral to another professional/service is not indicated at this time..    Anticipated number of session or this episode of care: 60    Goals  2. Education- the Biopsychosocial model of depression  a. Client will be able to describe how depression is effecting them physically, emotionally and socially  3. Behavioral Activation  a. Client will learn to assess their depression on a day to day basis  b. Client will Identify two forms of exercise/activity and engage in them 3 times per week  c. Client will Identify 3 things that make them laugh, and engage in these 5 times per week.  d. Client will Identify 1-2Creative activities or hobbies  and engage in them 2 times per week  e. Client will identify music, movies, books that make them feel good and use them 3-4 times per week  4. Self-care  a. Client will identify 5 things they can do just for themselves  b. Client will take time for quiet, reflection, meditation 5 times per week  c. Client will Learn to set boundaries when appropriate  d. Client will Identify 2 individuals they can call on for support, distraction  5. Assessment of progress  a. Client will engage in  assessment of their progress on a regular basis    Bipolar Disorder  Treatment plan:  1. Education- the Biopsychosocial model of Bipolar Disorder    a. Client will be able to describe in general terms what Bipolar Disorder  is and is not  b. Client will be able to describe how Bipolar Disorder  has affected their life in at least two different areas, such as school or work and Home/relationships  c. Clients parents/guardians or significant others will be provided information on what Bipolar Disorder  is and the ways it can affect relationships and be encouraged to be a part of clients treatment team.  2. Medication  a. Client will participate in medication evaluation for Bipolar Disorder  symptoms and follow medication recommendations.   3. Identification and management of triggers  a. Client and therapist will examine patient s history to determine if there are predictable triggers for manic or depressive episodes (e.g. boredom, anger, family stress)  b. Client and therapist will develop means of diffusing these triggers (e.g. relaxation strategies, boundary setting, anger management)  4. Comorbid conditions   a. Client and therapist will asses for comorbid conditions (e.g. anxiety, depression, substance use). and add additional items to treatment plan as needed  5. Self-care  a. Client will identify 3 things they can do just for themselves  b. Client will take time for quiet, reflection, meditation 3 times per week  c. Client will Learn to set boundaries when appropriate  d. Client will Identify 2 individuals they can call on for support, distraction  6. Behavioral Activation  a. Client will Identify two forms of exercise/activity and engage in them 3 times per week  b. Client will Identify 2 things that make them laugh, and engage in these 3 times per week.  c. Client will Identify 2 Creative activities or hobbies  and engage in them 2 times per week  d. Client will identify music, movies, books that make them  feel good and use them 3 times per week  7. Assessment of progress  a. Client will engage in assessment of their progress on a regular basis    Client has reviewed and agreed to the above plan.      Kd Morris  4/28/2020

## 2020-05-07 NOTE — PROGRESS NOTES
Clinic Care Coordination Contact    Follow Up Progress Note      Assessment: Outreach to patient.    Patient states her biggest problem currently is her depression. She is following with psychologist weekly and finds this beneficial.  She is taking her medications     Patient states her blood sugars have been excellent! She is following with diabetic education.     Chronic edema in legs is table, she does not have any open areas or signs of cellulitis at this time.    Patient states her sister is currently in the hospital. She is not sure exactly what is going on. She feels terrible she can't be with her. She states she called her sister and they held the phone up to her ear so she could talk to her. She states her sister did not respond harpreet she is heavily sedated.    Patient states her rib is sore today from sleeping on the sofa bed. She states there is a bar that she was lying on. She states she can't sleep in her own bed due to bugs.     Patient states her daughter got her credit card and ran up changes of over $300. She states she is pressing charges.          Goals addressed this encounter:   Goals Addressed                 This Visit's Progress      #1 Medical (pt-stated)   On track     Goal Statement: I want my cellulitis to heal.  Date goal set: 12/5/2019   Measure of Success: Cellulitis will be healed.  Barriers: current infection  Strengths: care coordination, lymphedema therapy  Date to Achieve By: July 2020   Patient expressed understanding of goal: yes    Action steps to achieve this goal  1. I will take and finish my antibiotics as prescribed  2. I will elevate my legs as much as possible for the first 48 hours  3. I will follow up with PCP as scheduled         #2 Medical (pt-stated)   On track     Goal Statement: I want my shortness of breath to improve    Date goal set: 11/1/19    Measure of Success: When I am not short of breath    Barriers: COPD    Strengths: Motivated    Date to Achieve By:  July2020    Patient expressed understanding of goal: Yes    Action steps to achieve this goal  1. I will get my nebulizer fixed - got new machine 1/2020  2. I will use nebulizer as instructed  3. I will see PCP  as scheduled  4. I will use my asthma action plan         #3 Improve chronic symptoms (pt-stated)   40%     Goal Statement: I want my edema to improve.  Date goal set: 12/5/2019   Measure of Success: I will be able to put support stockings on by myself.  Barriers: lymphedema  Strengths: care coordination, lymphedema clinic  Date to Achieve By: 7/1/20  Patient expressed understanding of goal: yes    Action steps to achieve this goal  1. I will elevate my legs throughout the day  2. I will attend lymphedema clinic appointments  3. I will take my diuretics as prescribed         #4 Monitoring (pt-stated)   60%     Goal Statement: I will weigh myself every day.  Date goal set: 12/5/2019   Measure of Success: Consistent record of daily weights.  Barriers: Does not like scale  Strengths: care coordination support  Date to Achieve By: July 2020  Patient expressed understanding of goal: yes    Action steps to achieve this goal  1. I will weigh myself every morning.  2. I will write my weight down in a notebook.  3. I will take an extra 20-40 mg of Lasix if my weight increases by 3 lbs.         Intervention/Education provided during outreach: Stay home, social distance, practice good handwashing. Patient is high risk for covid-19 due to her many co-morbidities.      Outreach Frequency: monthly    Plan:   Patient will continue working with psychologist weekly.    Care Coordinator will follow up in one month.  Medically stable currently.    Michela Whitaker RN, [facility] RN Coordinator  [facility]   5/7/2020    3:51 PM  [phone]

## 2020-05-11 ENCOUNTER — TELEPHONE (OUTPATIENT)
Dept: FAMILY MEDICINE | Facility: CLINIC | Age: 55
End: 2020-05-11

## 2020-05-11 NOTE — TELEPHONE ENCOUNTER
Patient called and states that unable to be seen in lab today 2/2 continued recommendation to quarantine due to cough.  States that she is a smoker and cough has been x ~ 2 months; no other new symptoms.  However, she was referred to On care today by clinic RN staff.      Denies diet, exercise or medications changes.  No S/S of illness, clotting or bleeding. Will continue current dosing and lab appointment scheduled for 5/22/20.  Patient will call to reschedule prn continues to be recommended for quarantine at that time.  Malaika Laguna RN May 11, 2020 10:08 AM

## 2020-05-11 NOTE — TELEPHONE ENCOUNTER
Reason for Call:  Other appointment    Detailed comments: Patient has a lab appointment and said someone was supposed to call her last week to go over symptoms before coming in to lab.    Phone Number Patient can be reached at: Home number on file [phone] (home)    Best Time: any    Can we leave a detailed message on this number? YES    Call taken on 5/11/2020 at 8:09 AM by Armida Durham

## 2020-05-12 ENCOUNTER — VIRTUAL VISIT (OUTPATIENT)
Dept: FAMILY MEDICINE | Facility: CLINIC | Age: 55
End: 2020-05-12
Payer: COMMERCIAL

## 2020-05-12 ENCOUNTER — TELEPHONE (OUTPATIENT)
Dept: FAMILY MEDICINE | Facility: CLINIC | Age: 55
End: 2020-05-12

## 2020-05-12 DIAGNOSIS — H10.9 BACTERIAL CONJUNCTIVITIS OF LEFT EYE: Primary | ICD-10-CM

## 2020-05-12 DIAGNOSIS — T78.2XXD ANAPHYLACTIC REACTION TO BEE STING, UNDETERMINED INTENT, SUBSEQUENT ENCOUNTER: ICD-10-CM

## 2020-05-12 DIAGNOSIS — T63.444D ANAPHYLACTIC REACTION TO BEE STING, UNDETERMINED INTENT, SUBSEQUENT ENCOUNTER: ICD-10-CM

## 2020-05-12 PROCEDURE — 99213 OFFICE O/P EST LOW 20 MIN: CPT | Mod: 95 | Performed by: INTERNAL MEDICINE

## 2020-05-12 RX ORDER — POLYMYXIN B SULFATE AND TRIMETHOPRIM 1; 10000 MG/ML; [USP'U]/ML
1-2 SOLUTION OPHTHALMIC EVERY 6 HOURS
Qty: 10 ML | Refills: 0 | Status: ON HOLD | OUTPATIENT
Start: 2020-05-12 | End: 2020-11-19

## 2020-05-12 NOTE — PATIENT INSTRUCTIONS
Patient Education     Bacterial Conjunctivitis    You have an infection in the membranes covering the white part of the eye. This part of the eye is called the conjunctiva. The infection is called conjunctivitis. The most common symptoms of conjunctivitis include a thick, pus-like discharge from the eye, swollen eyelids, redness, eyelids sticking together upon awakening, and a gritty or scratchy feeling in the eye. Your infection was caused by bacteria. It may be treated with medicine. With treatment, the infection takes about 7 to 10 days to resolve.  Home care    Use prescribed antibiotic eye drops or ointment as directed to treat the infection.    Apply a warm compress (towel soaked in warm water) to the affected eye 3 to 4 times a day. Do this just before applying medicine to the eye.    Use a warm, wet cloth to wipe away crusting of the eyelids in the morning. This is caused by mucus drainage during the night. You may also use saline irrigating solution or artificial tears to rinse away mucus in the eye. Do not put a patch over the eye.    Wash your hands before and after touching the infected eye. This is to prevent spreading the infection to the other eye, and to other people. Don't share your towels or washcloths with others.    You may use acetaminophen or ibuprofen to control pain, unless another medicine was prescribed. (Note: If you have chronic liver or kidney disease or have ever had a stomach ulcer or gastrointestinal bleeding, talk with your doctor before using these medicines.)    Don't wear contact lenses until your eyes have healed and all symptoms are gone.  Follow-up care  Follow up with your healthcare provider, or as advised.  When to seek medical advice  Call your healthcare provider right away if any of these occur:    Worsening vision    Increasing pain in the eye    Increasing swelling or redness of the eyelid    Redness spreading around the eye  Date Last Reviewed: 7/1/2017 2000-2019  [employer]. [address] [place] [address], [place] [address]. All rights reserved. This information is not intended as a substitute for professional medical care. Always follow your healthcare professional's instructions.

## 2020-05-12 NOTE — TELEPHONE ENCOUNTER
Patient is called and she awoke with crusted pink eyes this morning.  Advised VRT.  tx to appts. Alla ANDERSON RN

## 2020-05-12 NOTE — PROGRESS NOTES
"Bia Bill is a 54 year old female who is being evaluated via a billable video visit.      The patient has been notified of following:     \"This video visit will be conducted via a call between you and your physician/provider. We have found that certain health care needs can be provided without the need for an in-person physical exam.  This service lets us provide the care you need with a video conversation.  If a prescription is necessary we can send it directly to your pharmacy.  If lab work is needed we can place an order for that and you can then stop by our lab to have the test done at a later time.    Video visits are billed at different rates depending on your insurance coverage.  Please reach out to your insurance provider with any questions.    If during the course of the call the physician/provider feels a video visit is not appropriate, you will not be charged for this service.\"    Patient has given verbal consent for Video visit? Yes    How would you like to obtain your AVS? Mail a copy    Patient would like the video invitation sent by: Text to cell phone: [phone]    Will anyone else be joining your video visit? No      Subjective     Bia Bill is a 54 year old female who presents today via video visit for the following health issues:    HPI  Eye(s) Problem  Onset: yesterday    Description:   Location: left, little in right eye also  Pain: YES  Redness: YES    Accompanying Signs & Symptoms:  Discharge/mattering: YES  Swelling: YES  Visual changes: YES- foggy, blurry  Fever: no  Nasal Congestion: no  Bothered by bright lights: YES    History:   Trauma: no   Foreign body exposure: no     Precipitating factors:   Wearing contacts: no    Alleviating factors:  Improved by: none    Therapies Tried and outcome: none       Video Start Time: 1:41 PM        Current Outpatient Medications   Medication Sig Dispense Refill     acetaminophen (TYLENOL) 500 MG tablet Take 500-1,000 mg by mouth every " 6 hours as needed for mild pain       ADVAIR -21 MCG/ACT inhaler INHALE TWO PUFFS BY MOUTH TWICE A DAY 12 g 3     albuterol (PROAIR HFA/PROVENTIL HFA/VENTOLIN HFA) 108 (90 Base) MCG/ACT inhaler Inhale 2 puffs into the lungs every 6 hours as needed for shortness of breath / dyspnea or wheezing 1 Inhaler 11     albuterol (PROVENTIL) (2.5 MG/3ML) 0.083% neb solution Take 1 vial (2.5 mg) by nebulization every 6 hours as needed for shortness of breath / dyspnea or wheezing 1 Box 0     allopurinol (ZYLOPRIM) 300 MG tablet Take 1 tablet (300 mg) by mouth daily 90 tablet 3     ARIPiprazole (ABILIFY) 10 MG tablet TAKE ONE TABLET BY MOUTH ONCE DAILY 90 tablet 1     buPROPion (WELLBUTRIN XL) 150 MG 24 hr tablet TAKE ONE TABLET BY MOUTH EVERY EVENING 150 tablet 0     furosemide (LASIX) 40 MG tablet Take 1 tablet (40 mg) by mouth 2 times daily 180 tablet 4     gabapentin (NEURONTIN) 300 MG capsule TAKE ONE CAPSULE BY MOUTH ONCE DAILY AT BEDTIME WITH A 600 MG TABLET FOR A TOTAL DOSE  MG (Patient taking differently: Take 300 mg by mouth At Bedtime TAKE ONE CAPSULE BY MOUTH ONCE DAILY AT BEDTIME WITH A 600 MG TABLET FOR A TOTAL DOSE  MG) 90 capsule 0     gabapentin (NEURONTIN) 600 MG tablet TAKE ONE TABLET BY MOUTH THREE TIMES A DAY 90 tablet 3     ipratropium - albuterol 0.5 mg/2.5 mg/3 mL (DUONEB) 0.5-2.5 (3) MG/3ML neb solution Take 1 vial (3 mLs) by nebulization every 6 hours as needed for shortness of breath / dyspnea or wheezing 30 vial 11     lamoTRIgine (LAMICTAL) 100 MG tablet Take 1 tablet (100 mg) by mouth daily 90 tablet 3     levothyroxine (SYNTHROID/LEVOTHROID) 175 MCG tablet TAKE ONE TABLET BY MOUTH ONCE DAILY 90 tablet 0     metFORMIN (GLUCOPHAGE) 1000 MG tablet Take 1 tablet (1,000 mg) by mouth 2 times daily (with meals) 180 tablet 1     mometasone-formoterol (DULERA) 200-5 MCG/ACT oral inhaler Inhale 2 puffs into the lungs 2 times daily 13 g 1     omeprazole (PRILOSEC) 20 MG DR capsule Take 1  capsule (20 mg) by mouth 2 times daily 60 capsule 11     potassium chloride ER (KLOR-CON M) 10 MEQ CR tablet TAKE ONE TABLET BY MOUTH ONCE DAILY 90 tablet 2     pramipexole (MIRAPEX) 0.125 MG tablet TAKE 1-2 TABLETS BY MOUTH AT BEDTIME (Patient taking differently: Take 0.125-2.5 mg by mouth At Bedtime TAKE 1-2 TABLETS BY MOUTH AT BEDTIME) 60 tablet 10     simvastatin (ZOCOR) 20 MG tablet TAKE ONE TABLET BY MOUTH EVERY NIGHT AT BEDTIME (Patient taking differently: Take 20 mg by mouth At Bedtime ) 90 tablet 3     traZODone (DESYREL) 150 MG tablet TAKE ONE TABLET BY MOUTH EVERY NIGHT AT BEDTIME 90 tablet 1     trimethoprim-polymyxin b (POLYTRIM) 85322-3.1 UNIT/ML-% ophthalmic solution Place 1-2 drops Into the left eye every 6 hours 10 mL 0     VITAMIN D3 1000 units tablet TAKE ONE TABLET BY MOUTH ONCE DAILY (Patient taking differently: Take 1,000 Units by mouth daily ) 100 tablet 2     warfarin ANTICOAGULANT (JANTOVEN ANTICOAGULANT) 5 MG tablet TAKE TWO AND ONE-HALF TABLETS BY MOUTH EVERY MONDAY, WEDNESDAY AND FRIDAY.  TAKE 3 TABLETS ALL OTHER DAYS OR AS DIRECTED BY INR CLINIC 240 tablet 0     alcohol swab prep pads Use to swab area of injection/mike as directed. 100 each 3     blood glucose (NO BRAND SPECIFIED) test strip Use to test blood sugar 2-3x/day. To accompany: Blood Glucose Monitor Brands: per insurance. 100 strip 11     blood glucose calibration (NO BRAND SPECIFIED) solution To accompany: Blood Glucose Monitor Brands: per insurance. 1 Bottle 3     blood glucose monitoring (NO BRAND SPECIFIED) meter device kit Use to test blood sugar daily or as directed. Preferred blood glucose meter OR supplies to accompany: Blood Glucose Monitor Brands: per insurance. 1 kit 0     EPINEPHrine (EPIPEN/ADRENACLICK/OR ANY BX GENERIC EQUIV) 0.3 MG/0.3ML injection 2-pack Inject 0.3 mLs (0.3 mg) into the muscle once as needed for anaphylaxis (Patient not taking: Reported on 4/1/2020) 0.6 mL 0     order for DME Equipment being  "ordered:x2 [employer] 30/40mmHg compression wraps with x2 extra prs of compression liners 1 each 0     order for DME Equipment being ordered: Nebulizer 1 Device 0     order for DME Equipment being ordered: CPAP  AIRSENSE 10  5-18 CM H20  # [id number]   DN# [id number]       order for DME Equipment being ordered: DEPENDS SIZE LARGE 60 Month 5     order for DME Equipment being ordered: INCONTINENCE PADS   QID 1 Month 11     thin (NO BRAND SPECIFIED) lancets Use with lanceting device. To accompany: Blood Glucose Monitor Brands: per insurance. 1 each 6       Reviewed and updated as needed this visit by Provider  Allergies         Review of Systems   Constitutional, HEENT, cardiovascular, pulmonary, gi and gu systems are negative, except as otherwise noted.      Objective    LMP 09/27/2009   Estimated body mass index is 48.45 kg/m  as calculated from the following:    Height as of 1/30/20: 1.473 m (4' 10\").    Weight as of 1/30/20: 105.1 kg (231 lb 12.8 oz).  Physical Exam     GENERAL: Healthy, alert and no distress  EYES: mild-moderate conjunctival injection of the left eye with mild crusting.  No swelling is visible.  EOM w/out pain  RESP: No audible wheeze, cough, or visible cyanosis.  No visible retractions or increased work of breathing.    SKIN: Visible skin clear. No significant rash, abnormal pigmentation or lesions.  NEURO: Cranial nerves grossly intact.  Mentation and speech appropriate for age.  PSYCH: Mentation appears normal, affect normal/bright, judgement and insight intact, normal speech and appearance well-groomed.              Assessment & Plan     1. Bacterial conjunctivitis of left eye - lack of viral URI or allergic symptoms makes bacterial conjunctivitis most likely, however, mild-moderate severity is less consistent with bacterial.  Regardless, start eye drop.  Avoid contacts/eye makeup.  Discussed lid hygiene   - trimethoprim-polymyxin b (POLYTRIM) 24315-1.1 UNIT/ML-% ophthalmic solution; Place 1-2 drops " Into the left eye every 6 hours  Dispense: 10 mL; Refill: 0           Return in about 1 week (around 5/19/2020) for If symptoms don't improve or if they worsen.    Ayanna Espinoza DO  [facility]      Video-Visit Details    Type of service:  Video Visit    Video End Time:1:50 PM    Originating Location (pt. Location): Home    Distant Location (provider location):  [facility]     Platform used for Video Visit: AmWell    Return in about 1 week (around 5/19/2020) for If symptoms don't improve or if they worsen.       Ayanna Espinoza, DO

## 2020-05-12 NOTE — TELEPHONE ENCOUNTER
Norah is calling for a refill on her epi pen.  Not seen when it was last ordered.  Please review and fill if appropriate.  Thank you..Lizzette Mcintosh

## 2020-05-12 NOTE — TELEPHONE ENCOUNTER
Reason for call:  Patient reporting a symptom    Symptom or request: Norah is calling reporting that she has pink eye in both eyes.  Woke up with red, crusty eyes.  She was exposed to pink eye.  Please call and assess. Thank you..Lizzette Mcintosh    Duration (how long have symptoms been present): started yesterday 5/11/20.      Have you been treated for this before? No    Additional comments: [facility]    Phone Number patient can be reached at:  Home number on file [phone] (home)    Best Time:  Any time    Can we leave a detailed message on this number:  YES    Call taken on 5/12/2020 at 8:25 AM by Lizzette Mcintosh

## 2020-05-14 NOTE — TELEPHONE ENCOUNTER
Routing refill request to provider for review/approval because:  Last sent 06/12/19 by Dr. Leong.     Last office visit: 1/30/2020   Future Office Visit:   Next 5 appointments (look out 90 days)    Jun 17, 2020 10:45 AM CDT  Return Visit with Joshua Watts MD  [facility] ([facility]) [address]  [place] [address]  [phone]          VIVI CaldwellN, RN

## 2020-05-15 RX ORDER — EPINEPHRINE 0.3 MG/.3ML
0.3 INJECTION SUBCUTANEOUS
Qty: 0.6 ML | Refills: 0 | Status: SHIPPED | OUTPATIENT
Start: 2020-05-15

## 2020-05-20 ENCOUNTER — VIRTUAL VISIT (OUTPATIENT)
Dept: PSYCHOLOGY | Facility: CLINIC | Age: 55
End: 2020-05-20
Payer: COMMERCIAL

## 2020-05-20 DIAGNOSIS — F31.62 MODERATE MIXED BIPOLAR I DISORDER (H): Primary | ICD-10-CM

## 2020-05-20 PROCEDURE — 90834 PSYTX W PT 45 MINUTES: CPT | Mod: 95 | Performed by: PSYCHOLOGIST
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
## 2020-05-21 DIAGNOSIS — Z86.718 HISTORY OF RECURRENT DEEP VEIN THROMBOSIS (DVT): ICD-10-CM

## 2020-05-21 RX ORDER — WARFARIN SODIUM 5 MG/1
TABLET ORAL
Qty: 58 TABLET | Refills: 0 | Status: SHIPPED | OUTPATIENT
Start: 2020-05-21 | End: 2020-06-12

## 2020-05-22 ENCOUNTER — TELEPHONE (OUTPATIENT)
Dept: FAMILY MEDICINE | Facility: CLINIC | Age: 55
End: 2020-05-22

## 2020-05-22 DIAGNOSIS — I73.9 PVD (PERIPHERAL VASCULAR DISEASE) WITH CLAUDICATION (H): ICD-10-CM

## 2020-05-22 DIAGNOSIS — I82.409 DVT (DEEP VENOUS THROMBOSIS) (H): ICD-10-CM

## 2020-05-22 DIAGNOSIS — O22.30 DEEP PHLEBOTHROMBOSIS, ANTEPARTUM, WITH DELIVERY (H): ICD-10-CM

## 2020-05-22 DIAGNOSIS — I82.409 DEEP PHLEBOTHROMBOSIS, ANTEPARTUM, WITH DELIVERY (H): ICD-10-CM

## 2020-05-22 DIAGNOSIS — Z79.01 LONG TERM (CURRENT) USE OF ANTICOAGULANTS: ICD-10-CM

## 2020-05-22 DIAGNOSIS — Z86.73 HISTORY OF STROKE: ICD-10-CM

## 2020-05-22 LAB
HCT VFR BLD AUTO: 49.1 % (ref 35–47)
INR PPP: 2.42 (ref 0.86–1.14)

## 2020-05-22 PROCEDURE — 36415 COLL VENOUS BLD VENIPUNCTURE: CPT | Performed by: FAMILY MEDICINE

## 2020-05-22 PROCEDURE — 85014 HEMATOCRIT: CPT | Performed by: FAMILY MEDICINE

## 2020-05-22 PROCEDURE — 85610 PROTHROMBIN TIME: CPT | Performed by: FAMILY MEDICINE

## 2020-05-22 NOTE — TELEPHONE ENCOUNTER
ANTICOAGULATION MANAGEMENT     Patient Name:  Bia Bill  Date:  2020    ASSESSMENT /SUBJECTIVE:    Today's INR result of 2.42 is therapeutic. Goal INR of 2.0-3.0      Warfarin dose taken: Warfarin taken as previously instructed    Diet: No new diet changes affecting INR    Medication changes/ interactions: No new medications/supplements affecting INR    Previous INR: Therapeutic     S/S of bleeding or thromboembolism: No    New injury or illness: No    Upcoming surgery, procedure or cardioversion: No    Additional findings: None      PLAN:    Spoke with Bia regarding INR result and instructed:     Warfarin Dosing Instructions: Continue your current warfarin dose 12.5 on  and 10 mg all other days    Instructed patient to follow up no later than: 4 weeks  Lab visit scheduled    Education provided: None required      irving verbalizes understanding and agrees to warfarin dosing plan.    Instructed to call the Anticoagulation Clinic for any changes, questions or concerns. (#[phone])        OBJECTIVE:  INR   Date Value Ref Range Status   2020 2.42 (H) 0.86 - 1.14 Final             Anticoagulation Summary  As of 2020    INR goal:   2.0-3.0   TTR:   56.3 % (11.2 mo)   INR used for dosin.42 (2020)   Warfarin maintenance plan:   12.5 mg (5 mg x 2.5) every Mon; 10 mg (5 mg x 2) all other days   Full warfarin instructions:   12.5 mg every Mon; 10 mg all other days   Weekly warfarin total:   72.5 mg   Plan last modified:   Carmen Way RN (3/6/2020)   Next INR check:   2020   Target end date:   Indefinite    Indications    PVD (peripheral vascular disease) with claudication (H) [I73.9]  Long term current use of anticoagulant therapy (Resolved) [Z79.01]  DVT (deep venous thrombosis) (H) [I82.409]  History of stroke symptoms  stroke ruled out on imaging [Z86.73]             Anticoagulation Episode Summary     INR check location:       Preferred lab:       Send INR  reminders to:   ANGI BHAKTA    Comments:   * lab draw due to elevated hematocrit (fingerstick meters don't work). Needs to have adjusted INR if hct over 55%. LEFT LE. STENT x 3 LEFT UPPER LEG. Previous arterial clot. AS of 10/9/19 Goal range 2.0-3.0.      Anticoagulation Care Providers     Provider Role Specialty Phone number    Kd Leong MD [facility] [phone]

## 2020-05-26 DIAGNOSIS — E03.9 HYPOTHYROIDISM, UNSPECIFIED TYPE: Chronic | ICD-10-CM

## 2020-05-27 NOTE — TELEPHONE ENCOUNTER
Routing refill request to provider for review/approval because:  Labs out of range:  12/2/19 TSH of 4.19.  Mundo Conde RN

## 2020-05-28 RX ORDER — LEVOTHYROXINE SODIUM 175 UG/1
TABLET ORAL
Qty: 90 TABLET | Refills: 0 | Status: SHIPPED | OUTPATIENT
Start: 2020-05-28 | End: 2020-08-18

## 2020-06-01 ENCOUNTER — VIRTUAL VISIT (OUTPATIENT)
Dept: PSYCHOLOGY | Facility: CLINIC | Age: 55
End: 2020-06-01
Payer: COMMERCIAL

## 2020-06-01 DIAGNOSIS — F31.62 MODERATE MIXED BIPOLAR I DISORDER (H): Primary | ICD-10-CM

## 2020-06-01 DIAGNOSIS — J45.30 MILD PERSISTENT ASTHMA WITHOUT COMPLICATION: ICD-10-CM

## 2020-06-01 DIAGNOSIS — J44.9 CHRONIC OBSTRUCTIVE PULMONARY DISEASE, UNSPECIFIED COPD TYPE (H): Chronic | ICD-10-CM

## 2020-06-01 PROCEDURE — 90834 PSYTX W PT 45 MINUTES: CPT | Mod: 95 | Performed by: PSYCHOLOGIST

## 2020-06-01 RX ORDER — ALBUTEROL SULFATE 90 UG/1
AEROSOL, METERED RESPIRATORY (INHALATION)
Qty: 18 G | Refills: 0 | Status: SHIPPED | OUTPATIENT
Start: 2020-06-01

## 2020-06-01 NOTE — TELEPHONE ENCOUNTER
Medication is being filled for 1 time refill only due to:  Patient needs to be seen because needs follow up and ACT.. Mundo Conde RN
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
## 2020-06-02 DIAGNOSIS — J44.9 CHRONIC OBSTRUCTIVE PULMONARY DISEASE, UNSPECIFIED COPD TYPE (H): Chronic | ICD-10-CM

## 2020-06-02 DIAGNOSIS — J45.30 MILD PERSISTENT ASTHMA WITHOUT COMPLICATION: ICD-10-CM

## 2020-06-02 RX ORDER — ALBUTEROL SULFATE 90 UG/1
AEROSOL, METERED RESPIRATORY (INHALATION)
Qty: 8.5 G | Refills: 6 | OUTPATIENT
Start: 2020-06-02

## 2020-06-02 NOTE — TELEPHONE ENCOUNTER
albuterol (PROAIR HFA/PROVENTIL HFA/VENTOLIN HFA) 108 (90 Base) MCG/ACT inhaler  18 g  0  6/1/2020   No    Sig: INHALE TWO PUFFS BY MOUTH EVERY 6 HOURS AS NEEDED FOR SHORTNESS OF BREATH DIFFICULTY BREATHING OR WHEEZING    Sent to pharmacy as: Albuterol Sulfate  (90 Base) MCG/ACT Aerosol Solution (PROAIR HFA/PROVENTIL HFA/VENTOLIN HFA)    Class: E-Prescribe    Notes to Pharmacy: OK to fill today Needs follow up virtual visit before further refills    Order: [id number]    E-Prescribing Status: Receipt confirmed by pharmacy (6/1/2020 10:41 AM CDT)    Printout Tracking     External Result Report    Pharmacy     [facility] [place], [place] - [address] [place] [address]

## 2020-06-08 DIAGNOSIS — G63 POLYNEUROPATHY IN OTHER DISEASES CLASSIFIED ELSEWHERE (H): Chronic | ICD-10-CM

## 2020-06-08 RX ORDER — GABAPENTIN 600 MG/1
TABLET ORAL
Qty: 90 TABLET | Refills: 3 | Status: SHIPPED | OUTPATIENT
Start: 2020-06-08 | End: 2020-10-14

## 2020-06-08 NOTE — TELEPHONE ENCOUNTER
Routing refill request to provider for review/approval because:  Drug not on the [facility] refill protocol   Princess Atkins RN

## 2020-06-09 ENCOUNTER — PATIENT OUTREACH (OUTPATIENT)
Dept: CARE COORDINATION | Facility: CLINIC | Age: 55
End: 2020-06-09

## 2020-06-09 DIAGNOSIS — G63 POLYNEUROPATHY IN OTHER DISEASES CLASSIFIED ELSEWHERE (H): Chronic | ICD-10-CM

## 2020-06-09 NOTE — PROGRESS NOTES
Clinic Care Coordination Contact    Follow Up Progress Note      Assessment: Outreach to patient. Left message on patient's voicemail and requested return call.       Patient has been following with psychology every two weeks. No episodes of shortness of breath. Followed up with diabetic education in May.     Patient had virtual visit for conjunctivitis on 5/12/20     Goals addressed this encounter:   Goals Addressed    None     Intervention/Education provided during outreach: Left message and requested return call     Outreach Frequency: monthly    Plan:   Patient will return RN CC call.    Care Coordinator will follow up in one month.    Michela Whitaker RN, [facility] RN Coordinator  [facility]   6/9/2020    11:24 AM  [phone]

## 2020-06-10 RX ORDER — GABAPENTIN 300 MG/1
300 CAPSULE ORAL AT BEDTIME
Qty: 90 CAPSULE | Refills: 3 | Status: SHIPPED | OUTPATIENT
Start: 2020-06-10

## 2020-06-12 ENCOUNTER — PATIENT OUTREACH (OUTPATIENT)
Dept: ONCOLOGY | Facility: CLINIC | Age: 55
End: 2020-06-12

## 2020-06-12 DIAGNOSIS — D75.1 ERYTHROCYTOSIS: Primary | ICD-10-CM

## 2020-06-12 DIAGNOSIS — F31.62 MODERATE MIXED BIPOLAR I DISORDER (H): Chronic | ICD-10-CM

## 2020-06-12 NOTE — TELEPHONE ENCOUNTER
Routing refill request to provider for review/approval because:  Ordered by Dr. Leong  PCP listed is Dr. Summers  Unsure who is to continue to order this.    Brenna FLETCHER RN, BSN

## 2020-06-15 ENCOUNTER — ALLIED HEALTH/NURSE VISIT (OUTPATIENT)
Dept: EDUCATION SERVICES | Facility: CLINIC | Age: 55
End: 2020-06-15
Payer: COMMERCIAL

## 2020-06-15 DIAGNOSIS — E11.9 TYPE 2 DIABETES MELLITUS (H): Primary | ICD-10-CM

## 2020-06-15 PROCEDURE — 98966 PH1 ASSMT&MGMT NQHP 5-10: CPT | Mod: 95

## 2020-06-15 RX ORDER — BUPROPION HYDROCHLORIDE 150 MG/1
TABLET ORAL
Qty: 150 TABLET | Refills: 0 | Status: SHIPPED | OUTPATIENT
Start: 2020-06-15 | End: 2020-11-16

## 2020-06-15 NOTE — PROGRESS NOTES
"Diabetes Self-Management Education & Support  Presents for: Individual review.  Patient verbally consented to the telephone visit service today: yes    SUBJECTIVE/OBJECTIVE:  Presents for: Individual review  Accompanied by: Self  Diabetes education in the past 24mo: Yes  Focus of Visit: Problem Solving, Healthy Coping  Diabetes type: Type 2  Disease course: Improving  Other concerns:: None  Cultural Influences/Ethnic Background:  American    Diabetes Symptoms & Complications:   Not assessed at this visit        Patient Problem List and Family Medical History reviewed for relevant medical history, current medical status, and diabetes risk factors.    Vitals:  LMP 09/27/2009   Estimated body mass index is 48.45 kg/m  as calculated from the following:    Height as of 1/30/20: 1.473 m (4' 10\").    Weight as of 1/30/20: 105.1 kg (231 lb 12.8 oz).   Last 3 BP:   BP Readings from Last 3 Encounters:   01/30/20 122/64   01/29/20 139/63   01/23/20 (!) 143/60       History   Smoking Status     Current Every Day Smoker     Packs/day: 0.50     Years: 43.00     Types: Cigarettes   Smokeless Tobacco     Never Used     Comment: started at age 11.  Quit during pregnancyX4.  Quit 3 months ago.        Labs:  Lab Results   Component Value Date    A1C 7.2 01/23/2020     Lab Results   Component Value Date     01/23/2020     Lab Results   Component Value Date    LDL 48 09/27/2019     HDL Cholesterol   Date Value Ref Range Status   09/27/2019 31 (L) >49 mg/dL Final   ]  GFR Estimate   Date Value Ref Range Status   01/23/2020 >90 >60 mL/min/[1.73_m2] Final     Comment:     Non  GFR Calc  Starting 12/18/2018, serum creatinine based estimated GFR (eGFR) will be   calculated using the Chronic Kidney Disease Epidemiology Collaboration   (CKD-EPI) equation.       GFR Estimate If Black   Date Value Ref Range Status   01/23/2020 >90 >60 mL/min/[1.73_m2] Final     Comment:      GFR Calc  Starting 12/18/2018, " serum creatinine based estimated GFR (eGFR) will be   calculated using the Chronic Kidney Disease Epidemiology Collaboration   (CKD-EPI) equation.       Lab Results   Component Value Date    CR 0.66 01/23/2020     No results found for: MICROALBUMIN    Healthy Eating:  Healthy Eating Assessed Today: No  Meal planning/habits: Avoiding sweets, Carb counting  Meals include: Breakfast, Lunch, Dinner    Being Active:  Being Active Assessed Today: Yes  Exercise:: Yes(increasing walking and yard work, feels to be keeping very active)  How intense was your typical exercise? : Moderate (like brisk walking)    Monitoring:  Monitoring Assessed Today: Yes  Times checking blood sugar at home (number): 2  Times checking blood sugar at home (per): Day    152 fasting 6/15, 185 fasting 6/12 and 96 by evening (rarely higher)   Normally 100-125   Sometimes fluctuating (? Stress / sleeping patterns)     Taking Medications:  Diabetes Medication(s)     Biguanides       metFORMIN (GLUCOPHAGE) 1000 MG tablet    Take 1 tablet (1,000 mg) by mouth 2 times daily (with meals)          Taking Medication Assessed Today: Yes  Current Treatments: Diet, Oral Medication (taken by mouth)  Diabetes medication side effects?: No    Problem Solving:  Problem Solving Assessed Today: Yes  Is the patient at risk for hypoglycemia?: No  Is the patient at risk for DKA?: No  Does patient have sick day plan for diabetes management?: Yes    Reducing Risks:  Reducing Risks Assessed Today: No  Sees dentist every 6 months?: (has dentures - no gum problems)    Healthy Coping:  Emotional response to diabetes: Ready to learn  Stage of change: ACTION (Actively working towards change)  Patient Activation Measure Survey Score:  LEONA Score (Last Two) 1/12/2011   LEONA Raw Score 45   Activation Score 73.1   LEONA Level 4       Diabetes knowledge and skills assessment:   Patient is knowledgeable in diabetes management concepts related to: Healthy Eating, Being Active, Monitoring,  Taking Medication, Problem Solving, Reducing Risks and Healthy Coping  Patient needs further education on the following diabetes management concepts: general review PRN  Based on learning assessment above, most appropriate setting for further diabetes education would be: Individual setting.      INTERVENTIONS:  Education provided today on:  AADE Self-Care Behaviors:  Being Active: relationship to blood glucose  Problem Solving: high blood glucose - causes, signs/symptoms, treatment and prevention, low blood glucose - causes, signs/symptoms, treatment and prevention, safe travel, when to call health care provider and sick day arrangements  Healthy Coping: recognize feelings about diagnosis, benefits of making appropriate lifestyle changes and utilize support systems    Opportunities for ongoing education and support in diabetes-self management were discussed.  Pt verbalized understanding of concepts discussed and recommendations provided today.         ASSESSMENT:  Finished AADE7.  Advised PCP follow up and new labs.  Patient to update Diabetes education if A1c remains above 7.     Patient's most recent   Lab Results   Component Value Date    A1C 7.2 01/23/2020    is not meeting goal of <7.0    PLAN  See Patient Instructions for co-developed, patient-stated behavior change goals.  AVS printed and provided to patient today. See Follow-Up section for recommended follow-up.    Bia Waters RD, LD, CDE  Diabetes Education    Time Spent: 10 minutes  Encounter Type: Individual

## 2020-06-16 ENCOUNTER — VIRTUAL VISIT (OUTPATIENT)
Dept: PSYCHOLOGY | Facility: CLINIC | Age: 55
End: 2020-06-16
Payer: COMMERCIAL

## 2020-06-16 DIAGNOSIS — F31.62 MODERATE MIXED BIPOLAR I DISORDER (H): Primary | ICD-10-CM

## 2020-06-16 PROCEDURE — 90834 PSYTX W PT 45 MINUTES: CPT | Mod: 95 | Performed by: PSYCHOLOGIST
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
## 2020-06-19 ENCOUNTER — ANTICOAGULATION THERAPY VISIT (OUTPATIENT)
Dept: FAMILY MEDICINE | Facility: CLINIC | Age: 55
End: 2020-06-19

## 2020-06-19 ENCOUNTER — TELEPHONE (OUTPATIENT)
Dept: FAMILY MEDICINE | Facility: CLINIC | Age: 55
End: 2020-06-19

## 2020-06-19 DIAGNOSIS — I82.409 DVT (DEEP VENOUS THROMBOSIS) (H): ICD-10-CM

## 2020-06-19 DIAGNOSIS — D75.1 ERYTHROCYTOSIS: ICD-10-CM

## 2020-06-19 DIAGNOSIS — I73.9 PVD (PERIPHERAL VASCULAR DISEASE) WITH CLAUDICATION (H): ICD-10-CM

## 2020-06-19 DIAGNOSIS — Z86.73 HISTORY OF STROKE: ICD-10-CM

## 2020-06-19 DIAGNOSIS — E03.9 HYPOTHYROIDISM, UNSPECIFIED TYPE: Chronic | ICD-10-CM

## 2020-06-19 LAB
BASOPHILS # BLD AUTO: 0 10E9/L (ref 0–0.2)
BASOPHILS NFR BLD AUTO: 0.3 %
DIFFERENTIAL METHOD BLD: ABNORMAL
EOSINOPHIL # BLD AUTO: 0.1 10E9/L (ref 0–0.7)
EOSINOPHIL NFR BLD AUTO: 1.3 %
ERYTHROCYTE [DISTWIDTH] IN BLOOD BY AUTOMATED COUNT: 21.7 % (ref 10–15)
HCT VFR BLD AUTO: 47.4 % (ref 35–47)
HGB BLD-MCNC: 13.5 G/DL (ref 11.7–15.7)
INR PPP: 2.05 (ref 0.86–1.14)
LYMPHOCYTES # BLD AUTO: 1.4 10E9/L (ref 0.8–5.3)
LYMPHOCYTES NFR BLD AUTO: 23.2 %
MCH RBC QN AUTO: 24.2 PG (ref 26.5–33)
MCHC RBC AUTO-ENTMCNC: 28.5 G/DL (ref 31.5–36.5)
MCV RBC AUTO: 85 FL (ref 78–100)
MONOCYTES # BLD AUTO: 0.4 10E9/L (ref 0–1.3)
MONOCYTES NFR BLD AUTO: 6.8 %
NEUTROPHILS # BLD AUTO: 4.1 10E9/L (ref 1.6–8.3)
NEUTROPHILS NFR BLD AUTO: 68.4 %
PLATELET # BLD AUTO: 110 10E9/L (ref 150–450)
RBC # BLD AUTO: 5.59 10E12/L (ref 3.8–5.2)
TSH SERPL DL<=0.005 MIU/L-ACNC: 4 MU/L (ref 0.4–4)
WBC # BLD AUTO: 6 10E9/L (ref 4–11)

## 2020-06-19 PROCEDURE — 85025 COMPLETE CBC W/AUTO DIFF WBC: CPT | Performed by: INTERNAL MEDICINE

## 2020-06-19 PROCEDURE — 84443 ASSAY THYROID STIM HORMONE: CPT | Performed by: FAMILY MEDICINE

## 2020-06-19 PROCEDURE — 85610 PROTHROMBIN TIME: CPT | Performed by: INTERNAL MEDICINE

## 2020-06-19 PROCEDURE — 99207 ZZC NO CHARGE NURSE ONLY: CPT | Performed by: FAMILY MEDICINE

## 2020-06-19 PROCEDURE — 36415 COLL VENOUS BLD VENIPUNCTURE: CPT | Performed by: INTERNAL MEDICINE

## 2020-06-19 NOTE — TELEPHONE ENCOUNTER
Attempted to contact patient, no answer, left voice message to call back.    [facility] okay to deliver message.

## 2020-06-19 NOTE — TELEPHONE ENCOUNTER
Patient mentioned to the [facility] nurse today that she has lost 16 lbs in a month without trying. She states she has been a little more active with yard work but other than that reports no change to exercise or diet. She states she feels a lot better now.  Was 236 lbs a month ago now 220 lb. Because it was a significant weight loss without trying will route message to pcp and allow her to f/u with the patient if there are concerns r/t to significant weight loss.    Carmen Way RN on 6/19/2020 at 11:26 AM

## 2020-06-19 NOTE — PROGRESS NOTES
SUBJECTIVE:                                                    Bia Bill is 54 year old female   Chief Complaint   Patient presents with     Unexplained Weight Loss     Unexplained Weightloss  Wt Readings from Last 4 Encounters:   06/22/20 100.1 kg (220 lb 9.6 oz)   01/30/20 105.1 kg (231 lb 12.8 oz)   01/29/20 106.2 kg (234 lb 3.2 oz)   01/23/20 109.9 kg (242 lb 4.6 oz)   Symptoms for one month.     States that she has been losing weight.   Has been eating smaller portions and has been more active out doors  States that she does not know how much she has lost.      Eye pain  Pain and swelling on left lower eye lid for 2 months. States that she recently had a video visit with a family practice provider regarding this.  States that swelling worsened after running out of Polytrim eye drops.     Problem list and histories reviewed & adjusted, as indicated.  Additional history: is eating smaller more frequent meals, blood sugars often over 200    Patient Active Problem List   Diagnosis     Mild persistent asthma     Polyneuropathy in other diseases classified elsewhere (H)     DVT (deep venous thrombosis) (H)     Alcohol abuse, in remission     SECONDARY POLYCYTHEMIA     Chondromalacia of patella     Developmental reading disorder     Esophageal reflux     Hypothyroidism     Fatty liver     GILES (Obstructive Sleep Apnea)-Moderate (AHI 16)     RLS (restless legs syndrome)     Smoker     Erythrocytosis     Moderate mixed bipolar I disorder (H)     Personality disorder, depressive     COPD (chronic obstructive pulmonary disease) (H)     Rosacea     Health Care Home     DDD (degenerative disc disease), cervical     Benign neoplasm of colon (POLYPOSIS)     History of stroke symptoms, stroke ruled out on imaging     Somatization disorder     Sebaceous cyst of right axilla     HTN, goal below 140/90     Left leg pain     Vitamin D deficiency     Morbid obesity (H)     PVD (peripheral vascular disease) with  claudication (H)     Cyst of left ovary     Morbid obesity with alveolar hypoventilation (H)     Severe episode of recurrent major depressive disorder (H)     Conductive hearing loss of left ear with restricted hearing of right ear     Sensorineural hearing loss (SNHL) of right ear with restricted hearing of left ear     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     COPD exacerbation (H)     Lymphedema of both lower extremities     Type 2 diabetes mellitus (H)     Bilateral edema of lower extremity     Dyspnea     Thrombocytopenia (H)     Past Surgical History:   Procedure Laterality Date     BONE MARROW BIOPSY, BONE SPECIMEN, NEEDLE/TROCAR N/A 11/17/2014    Procedure: BIOPSY BONE MARROW;  Surgeon: Hay Aaron MD;  Location: [facility]     D & C  10/26/09    with uterine ablation     ENDOVASCULAR PLACEMENT VASCULAR DEVICE Left     Left leg stent x 3     ESOPHAGOSCOPY, GASTROSCOPY, DUODENOSCOPY (EGD), COMBINED  4/21/2014    Procedure: Gastroscopy;  Surgeon: Moris Thomas MD;  Location: [facility]     HYSTERECTOMY, PAP NO LONGER INDICATED  1-4-2010     LAPAROSCOPIC CHOLECYSTECTOMY N/A 1/4/2019    Procedure: Laparoscopic cholecystectomy;  Surgeon: Aaron Siegel MD;  Location: [facility]     LITHOTRIPSY  2004    Lithotrypsy       Social History     Tobacco Use     Smoking status: Current Every Day Smoker     Packs/day: 0.50     Years: 43.00     Pack years: 21.50     Types: Cigarettes     Smokeless tobacco: Never Used     Tobacco comment: started at age 11.  Quit during pregnancyX4.  Quit 3 months ago.    Substance Use Topics     Alcohol use: No     Alcohol/week: 0.0 standard drinks     Comment: quit 1995     Family History   Problem Relation Age of Onset     Hypertension Mother      Diabetes Mother      Blood Disease Mother      Heart Disease Mother         chf     Cerebrovascular Disease Mother      Hypertension Father      Cancer Father         lung cancer      Allergies Sister      Diabetes Sister      Depression Sister      Hypertension Sister          Current Outpatient Medications   Medication Sig Dispense Refill     acetaminophen (TYLENOL) 500 MG tablet Take 500-1,000 mg by mouth every 6 hours as needed for mild pain       ADVAIR -21 MCG/ACT inhaler INHALE TWO PUFFS BY MOUTH TWICE A DAY 12 g 3     albuterol (PROAIR HFA/PROVENTIL HFA/VENTOLIN HFA) 108 (90 Base) MCG/ACT inhaler INHALE TWO PUFFS BY MOUTH EVERY 6 HOURS AS NEEDED FOR SHORTNESS OF BREATH DIFFICULTY BREATHING OR WHEEZING 18 g 0     albuterol (PROVENTIL) (2.5 MG/3ML) 0.083% neb solution Take 1 vial (2.5 mg) by nebulization every 6 hours as needed for shortness of breath / dyspnea or wheezing 1 Box 0     alcohol swab prep pads Use to swab area of injection/mike as directed. 100 each 3     allopurinol (ZYLOPRIM) 300 MG tablet TAKE ONE TABLET BY MOUTH ONCE DAILY 90 tablet 1     ARIPiprazole (ABILIFY) 10 MG tablet TAKE ONE TABLET BY MOUTH ONCE DAILY 90 tablet 1     blood glucose (NO BRAND SPECIFIED) test strip Use to test blood sugar 2-3x/day. To accompany: Blood Glucose Monitor Brands: per insurance. 100 strip 11     blood glucose calibration (NO BRAND SPECIFIED) solution To accompany: Blood Glucose Monitor Brands: per insurance. 1 Bottle 3     blood glucose monitoring (NO BRAND SPECIFIED) meter device kit Use to test blood sugar daily or as directed. Preferred blood glucose meter OR supplies to accompany: Blood Glucose Monitor Brands: per insurance. 1 kit 0     buPROPion (WELLBUTRIN XL) 150 MG 24 hr tablet TAKE 1 TABLET BY MOUTH EVERY EVENING 150 tablet 0     EPINEPHrine (ANY BX GENERIC EQUIV) 0.3 MG/0.3ML injection 2-pack Inject 0.3 mLs (0.3 mg) into the muscle once as needed for anaphylaxis 0.6 mL 0     furosemide (LASIX) 40 MG tablet Take 1 tablet (40 mg) by mouth 2 times daily 180 tablet 4     gabapentin (NEURONTIN) 300 MG capsule Take 1 capsule (300 mg) by mouth At Bedtime TAKE ONE CAPSULE BY MOUTH  ONCE DAILY AT BEDTIME WITH A 600 MG TABLET FOR A TOTAL DOSE  MG 90 capsule 3     gabapentin (NEURONTIN) 600 MG tablet TAKE ONE TABLET BY MOUTH THREE TIMES A DAY 90 tablet 3     ipratropium - albuterol 0.5 mg/2.5 mg/3 mL (DUONEB) 0.5-2.5 (3) MG/3ML neb solution Take 1 vial (3 mLs) by nebulization every 6 hours as needed for shortness of breath / dyspnea or wheezing 30 vial 11     lamoTRIgine (LAMICTAL) 100 MG tablet TAKE ONE TABLET BY MOUTH ONCE DAILY 90 tablet 1     levothyroxine (SYNTHROID/LEVOTHROID) 175 MCG tablet TAKE ONE TABLET BY MOUTH ONCE DAILY 90 tablet 0     metFORMIN (GLUCOPHAGE) 1000 MG tablet Take 1 tablet (1,000 mg) by mouth 2 times daily (with meals) 180 tablet 1     mometasone-formoterol (DULERA) 200-5 MCG/ACT oral inhaler Inhale 2 puffs into the lungs 2 times daily 13 g 1     omeprazole (PRILOSEC) 20 MG DR capsule Take 1 capsule (20 mg) by mouth 2 times daily 60 capsule 11     potassium chloride ER (KLOR-CON M) 10 MEQ CR tablet TAKE ONE TABLET BY MOUTH ONCE DAILY 90 tablet 2     pramipexole (MIRAPEX) 0.125 MG tablet Take 1-20 tablets (0.125-2.5 mg) by mouth At Bedtime TAKE 1-2 TABLETS BY MOUTH AT BEDTIME 180 tablet 1     simvastatin (ZOCOR) 20 MG tablet TAKE ONE TABLET BY MOUTH EVERY NIGHT AT BEDTIME (Patient taking differently: Take 20 mg by mouth At Bedtime ) 90 tablet 3     traZODone (DESYREL) 150 MG tablet TAKE ONE TABLET BY MOUTH EVERY NIGHT AT BEDTIME 90 tablet 1     VITAMIN D3 1000 units tablet TAKE ONE TABLET BY MOUTH ONCE DAILY (Patient taking differently: Take 1,000 Units by mouth daily ) 100 tablet 2     warfarin ANTICOAGULANT (JANTOVEN ANTICOAGULANT) 5 MG tablet TAKE TWO AND ONE-HALF TABLETS BY MOUTH EVERY MONDAY, TWO TABLETS ON ALL OTHER DAYS OR AS DIRECTED BY ANTICOAGULATION CLINIC 175 tablet 0     order for DME Equipment being ordered:x2 Biacare 30/40mmHg compression wraps with x2 extra prs of compression liners 1 each 0     order for DME Equipment being ordered: Nebulizer 1  Device 0     order for DME Equipment being ordered: CPAP  AIRSENSE 10  5-18 CM H20  # [id number]   DN# [id number]       order for DME Equipment being ordered: DEPENDS SIZE LARGE 60 Month 5     order for DME Equipment being ordered: INCONTINENCE PADS   QID 1 Month 11     thin (NO BRAND SPECIFIED) lancets Use with lanceting device. To accompany: Blood Glucose Monitor Brands: per insurance. 1 each 6     trimethoprim-polymyxin b (POLYTRIM) 87581-9.1 UNIT/ML-% ophthalmic solution Place 1-2 drops Into the left eye every 6 hours (Patient not taking: Reported on 6/22/2020) 10 mL 0     Allergies   Allergen Reactions     Darvocet [Propoxyphene N-Apap] Anaphylaxis     Darvocet,Percocet      Percocet [Oxycodone-Acetaminophen] Anaphylaxis, Hives and Swelling     Has tolerated hydromorphone in the past.      Asa [Aspirin] Hives     Aspirin causes seizures and hives, throat swelling.   Has tolerated ketorolac in the past.        Bee      Ibuprofen Swelling     Throat swelling per patient      Lyrica [Pregabalin] Other (See Comments) and Swelling     dizziness     Povidone Iodine Rash     Reaction to topical betadine     Tape [Adhesive Tape] Rash     Recent Labs   Lab Test 06/19/20  0917 01/23/20  0444 01/22/20  2056  12/10/19  1205 12/02/19  1003  11/08/19  1617  10/29/19  0822  09/27/19  1309  04/06/18  1316  03/15/17  1032  12/29/16  1415   A1C  --  7.2*  --   --   --   --   --   --   --  6.1*  --   --   --   --   --   --   --  6.2*   LDL  --   --   --   --   --   --   --   --   --   --   --  48  --  101*  --  90  --   --    HDL  --   --   --   --   --   --   --   --   --   --   --  31*  --  46*  --  30*  --   --    TRIG  --   --   --   --   --   --   --   --   --   --   --  173*  --  198*  --  172*  --   --    ALT  --   --  16  --  27  --   --  32   < > 42   < >  --    < >  --    < >  --    < >  --    CR  --  0.66 0.82   < > 0.72  --    < > 0.89   < > 0.69   < >  --    < >  --    < >  --    < >  --    GFRESTIMATED  --  >90 81   < >  ">90  --    < > 74   < > >90   < >  --    < >  --    < >  --    < >  --    GFRESTBLACK  --  >90 >90   < > >90  --    < > 86   < > >90   < >  --    < >  --    < >  --    < >  --    POTASSIUM  --  3.7 3.2*   < > 2.9*  --    < > 3.5   < > 4.1   < >  --    < >  --    < >  --    < >  --    TSH 4.00  --   --   --   --  4.19*   < >  --   --   --   --  6.91*  --  1.07  --  2.79  --   --     < > = values in this interval not displayed.      BP Readings from Last 3 Encounters:   06/22/20 134/58   01/30/20 122/64   01/29/20 139/63    Wt Readings from Last 3 Encounters:   06/22/20 100.1 kg (220 lb 9.6 oz)   01/30/20 105.1 kg (231 lb 12.8 oz)   01/29/20 106.2 kg (234 lb 3.2 oz)         ROS:  Constitutional, HEENT, cardiovascular, pulmonary, gi and gu systems are negative, except as otherwise noted.    OBJECTIVE:                                                    /58   Pulse 79   Temp 98.5  F (36.9  C) (Tympanic)   Resp 12   Ht 1.486 m (4' 10.5\")   Wt 100.1 kg (220 lb 9.6 oz)   LMP 09/27/2009   SpO2 93%   BMI 45.32 kg/m    GENERAL APPEARANCE ADULT: Alert, no acute distress, morbidly obese  HENT: Ears and TMs normal, oral mucosa and posterior oropharynx normal, tongue dry  RESP: lungs clear to auscultation   PSYCH: mentation appears normal., affect and mood normal  Diagnostic Test Results:  Results for orders placed or performed in visit on 06/22/20   CBC with platelets differential     Status: Abnormal   Result Value Ref Range    WBC 6.9 4.0 - 11.0 10e9/L    RBC Count 5.85 (H) 3.8 - 5.2 10e12/L    Hemoglobin 14.3 11.7 - 15.7 g/dL    Hematocrit 49.1 (H) 35.0 - 47.0 %    MCV 84 78 - 100 fl    MCH 24.4 (L) 26.5 - 33.0 pg    MCHC 29.1 (L) 31.5 - 36.5 g/dL    RDW 21.8 (H) 10.0 - 15.0 %    Platelet Count 117 (L) 150 - 450 10e9/L    % Neutrophils 72.9 %    % Lymphocytes 18.9 %    % Monocytes 6.4 %    % Eosinophils 1.2 %    % Basophils 0.6 %    Absolute Neutrophil 5.1 1.6 - 8.3 10e9/L    Absolute Lymphocytes 1.3 0.8 - 5.3 " 10e9/L    Absolute Monocytes 0.4 0.0 - 1.3 10e9/L    Absolute Eosinophils 0.1 0.0 - 0.7 10e9/L    Absolute Basophils 0.0 0.0 - 0.2 10e9/L    Diff Method Automated Method           ASSESSMENT/PLAN:                                                    1. Weight loss  May be diet related or worsening blood sugars.  Will check diabetes labs and if out of control consider starting insulin or adding another oral diabetes medication.  Because she is on so many pills at this time leaning towards starting on lantis.    2. Type 2 diabetes mellitus without complication, without long-term current use of insulin (H)  - Comprehensive metabolic panel  - TSH  - T4 FREE  - Hemoglobin A1c  - Albumin Random Urine Quantitative with Creat Ratio  - Lipid panel reflex to direct LDL Fasting    3. Thrombocytopenia (H)  - CBC with platelets differential    Gina Summers MD  [facility]

## 2020-06-19 NOTE — PROGRESS NOTES
ANTICOAGULATION FOLLOW-UP CLINIC VISIT    Patient Name:  Bia Bill  Date:  2020  Contact Type:  Telephone    SUBJECTIVE:  Patient Findings     Positives:   Change in health, Change in activity    Comments:   Losing without trying. Have been more active lately, yard work. Feels better now.  236 to 220, lost 16 in a month.  Due to significant weight loss in a short period of time w/o trying message routed to pcp. She was instructed monitor weight and if continuing to lose significant amount of weight to f/u with pcp.  INR also decrease and on lower end of goal. She will recheck INR sooner if continues to lose more weight as metabolism may have changed and she may require a higher dose.        Clinical Outcomes     Negatives:   Major bleeding event, Thromboembolic event, Anticoagulation-related hospital admission, Anticoagulation-related ED visit, Anticoagulation-related fatality    Comments:   Losing without trying. Have been more active lately, yard work. Feels better now.  236 to 220, lost 16 in a month.  Due to significant weight loss in a short period of time w/o trying message routed to pcp. She was instructed monitor weight and if continuing to lose significant amount of weight to f/u with pcp.  INR also decrease and on lower end of goal. She will recheck INR sooner if continues to lose more weight as metabolism may have changed and she may require a higher dose.           OBJECTIVE    Recent labs: (last 7 days)     20  0916   INR 2.05*       ASSESSMENT / PLAN  No question data found.  Anticoagulation Summary  As of 2020    INR goal:   2.0-3.0   TTR:   60.5 % (11.6 mo)   INR used for dosin.05 (2020)   Warfarin maintenance plan:   12.5 mg (5 mg x 2.5) every Mon; 10 mg (5 mg x 2) all other days   Full warfarin instructions:   12.5 mg every Mon; 10 mg all other days   Weekly warfarin total:   72.5 mg   Plan last modified:   Carmen Way RN (2020)   Next INR check:    7/17/2020   Priority:   Maintenance   Target end date:   Indefinite    Indications    PVD (peripheral vascular disease) with claudication (H) [I73.9]  Long term current use of anticoagulant therapy (Resolved) [Z79.01]  DVT (deep venous thrombosis) (H) [I82.409]  History of stroke symptoms  stroke ruled out on imaging [Z86.73]             Anticoagulation Episode Summary     INR check location:       Preferred lab:       Send INR reminders to:   ANGI BHAKTA    Comments:   * lab draw due to elevated hematocrit (fingerstick meters don't work). Needs to have adjusted INR if hct over 55%. LEFT LE. STENT x 3 LEFT UPPER LEG. Previous arterial clot. AS of 10/9/19 Goal range 2.0-3.0.      Anticoagulation Care Providers     Provider Role Specialty Phone number    Kd Leong MD [facility] [phone]            See the Encounter Report to view Anticoagulation Flowsheet and Dosing Calendar (Go to Encounters tab in chart review, and find the Anticoagulation Therapy Visit)        Carmen Way RN

## 2020-06-22 ENCOUNTER — OFFICE VISIT (OUTPATIENT)
Dept: FAMILY MEDICINE | Facility: CLINIC | Age: 55
End: 2020-06-22
Payer: COMMERCIAL

## 2020-06-22 VITALS
DIASTOLIC BLOOD PRESSURE: 58 MMHG | RESPIRATION RATE: 12 BRPM | BODY MASS INDEX: 44.47 KG/M2 | HEART RATE: 79 BPM | SYSTOLIC BLOOD PRESSURE: 134 MMHG | WEIGHT: 220.6 LBS | HEIGHT: 59 IN | OXYGEN SATURATION: 93 % | TEMPERATURE: 98.5 F

## 2020-06-22 DIAGNOSIS — E11.9 TYPE 2 DIABETES MELLITUS WITHOUT COMPLICATION, WITHOUT LONG-TERM CURRENT USE OF INSULIN (H): ICD-10-CM

## 2020-06-22 DIAGNOSIS — R63.4 WEIGHT LOSS: Primary | ICD-10-CM

## 2020-06-22 DIAGNOSIS — D69.6 THROMBOCYTOPENIA (H): ICD-10-CM

## 2020-06-22 PROBLEM — I50.32 CHRONIC DIASTOLIC (CONGESTIVE) HEART FAILURE (H): Status: ACTIVE | Noted: 2020-06-22

## 2020-06-22 PROBLEM — I50.32 CHRONIC DIASTOLIC (CONGESTIVE) HEART FAILURE (H): Status: RESOLVED | Noted: 2020-06-22 | Resolved: 2020-06-22

## 2020-06-22 LAB
ALBUMIN SERPL-MCNC: 3.1 G/DL (ref 3.4–5)
ALP SERPL-CCNC: 127 U/L (ref 40–150)
ALT SERPL W P-5'-P-CCNC: 18 U/L (ref 0–50)
ANION GAP SERPL CALCULATED.3IONS-SCNC: 4 MMOL/L (ref 3–14)
AST SERPL W P-5'-P-CCNC: 9 U/L (ref 0–45)
BASOPHILS # BLD AUTO: 0 10E9/L (ref 0–0.2)
BASOPHILS NFR BLD AUTO: 0.6 %
BILIRUB SERPL-MCNC: 0.4 MG/DL (ref 0.2–1.3)
BUN SERPL-MCNC: 12 MG/DL (ref 7–30)
CALCIUM SERPL-MCNC: 8.8 MG/DL (ref 8.5–10.1)
CHLORIDE SERPL-SCNC: 101 MMOL/L (ref 94–109)
CHOLEST SERPL-MCNC: 111 MG/DL
CO2 SERPL-SCNC: 32 MMOL/L (ref 20–32)
CREAT SERPL-MCNC: 0.71 MG/DL (ref 0.52–1.04)
CREAT UR-MCNC: 62 MG/DL
DIFFERENTIAL METHOD BLD: ABNORMAL
EOSINOPHIL # BLD AUTO: 0.1 10E9/L (ref 0–0.7)
EOSINOPHIL NFR BLD AUTO: 1.2 %
ERYTHROCYTE [DISTWIDTH] IN BLOOD BY AUTOMATED COUNT: 21.8 % (ref 10–15)
GFR SERPL CREATININE-BSD FRML MDRD: >90 ML/MIN/{1.73_M2}
GLUCOSE SERPL-MCNC: 113 MG/DL (ref 70–99)
HBA1C MFR BLD: 6.2 % (ref 0–5.6)
HCT VFR BLD AUTO: 49.1 % (ref 35–47)
HDLC SERPL-MCNC: 32 MG/DL
HGB BLD-MCNC: 14.3 G/DL (ref 11.7–15.7)
LDLC SERPL CALC-MCNC: 36 MG/DL
LYMPHOCYTES # BLD AUTO: 1.3 10E9/L (ref 0.8–5.3)
LYMPHOCYTES NFR BLD AUTO: 18.9 %
MCH RBC QN AUTO: 24.4 PG (ref 26.5–33)
MCHC RBC AUTO-ENTMCNC: 29.1 G/DL (ref 31.5–36.5)
MCV RBC AUTO: 84 FL (ref 78–100)
MICROALBUMIN UR-MCNC: 45 MG/L
MICROALBUMIN/CREAT UR: 72.21 MG/G CR (ref 0–25)
MONOCYTES # BLD AUTO: 0.4 10E9/L (ref 0–1.3)
MONOCYTES NFR BLD AUTO: 6.4 %
NEUTROPHILS # BLD AUTO: 5.1 10E9/L (ref 1.6–8.3)
NEUTROPHILS NFR BLD AUTO: 72.9 %
NONHDLC SERPL-MCNC: 79 MG/DL
PLATELET # BLD AUTO: 117 10E9/L (ref 150–450)
POTASSIUM SERPL-SCNC: 3.9 MMOL/L (ref 3.4–5.3)
PROT SERPL-MCNC: 6.3 G/DL (ref 6.8–8.8)
RBC # BLD AUTO: 5.85 10E12/L (ref 3.8–5.2)
SODIUM SERPL-SCNC: 137 MMOL/L (ref 133–144)
T4 FREE SERPL-MCNC: 1.4 NG/DL (ref 0.76–1.46)
TRIGL SERPL-MCNC: 213 MG/DL
TSH SERPL DL<=0.005 MIU/L-ACNC: 3.34 MU/L (ref 0.4–4)
WBC # BLD AUTO: 6.9 10E9/L (ref 4–11)

## 2020-06-22 PROCEDURE — 80061 LIPID PANEL: CPT | Performed by: FAMILY MEDICINE

## 2020-06-22 PROCEDURE — 36415 COLL VENOUS BLD VENIPUNCTURE: CPT | Performed by: FAMILY MEDICINE

## 2020-06-22 PROCEDURE — 84439 ASSAY OF FREE THYROXINE: CPT | Performed by: FAMILY MEDICINE

## 2020-06-22 PROCEDURE — 80050 GENERAL HEALTH PANEL: CPT | Performed by: FAMILY MEDICINE

## 2020-06-22 PROCEDURE — 83036 HEMOGLOBIN GLYCOSYLATED A1C: CPT | Performed by: FAMILY MEDICINE

## 2020-06-22 PROCEDURE — 99214 OFFICE O/P EST MOD 30 MIN: CPT | Performed by: FAMILY MEDICINE

## 2020-06-22 PROCEDURE — 82043 UR ALBUMIN QUANTITATIVE: CPT | Performed by: FAMILY MEDICINE

## 2020-06-22 ASSESSMENT — MIFFLIN-ST. JEOR: SCORE: 1503.33

## 2020-06-22 NOTE — NURSING NOTE
"Initial /58   Pulse 79   Temp 98.5  F (36.9  C) (Tympanic)   Resp 12   Ht 1.486 m (4' 10.5\")   Wt 100.1 kg (220 lb 9.6 oz)   LMP 09/27/2009   SpO2 93%   BMI 45.32 kg/m   Estimated body mass index is 45.32 kg/m  as calculated from the following:    Height as of this encounter: 1.486 m (4' 10.5\").    Weight as of this encounter: 100.1 kg (220 lb 9.6 oz). .      "

## 2020-06-22 NOTE — LETTER
June 22, 2020      Bia Bill  [address]  [place] [address]        Dear ,    We are writing to inform you of your test results.    A1c and platelet count not normal but at goal, triglicerides and protein levels suggest eating too many sweets and not enough protein.  Try to add 4 ounces of protein 3 times a day to your diet, stop sugar containing foods.      Resulted Orders   Comprehensive metabolic panel   Result Value Ref Range    Sodium 137 133 - 144 mmol/L    Potassium 3.9 3.4 - 5.3 mmol/L    Chloride 101 94 - 109 mmol/L    Carbon Dioxide 32 20 - 32 mmol/L    Anion Gap 4 3 - 14 mmol/L    Glucose 113 (H) 70 - 99 mg/dL      Comment:      Fasting specimen    Urea Nitrogen 12 7 - 30 mg/dL    Creatinine 0.71 0.52 - 1.04 mg/dL    GFR Estimate >90 >60 mL/min/[1.73_m2]      Comment:      Non  GFR Calc  Starting 12/18/2018, serum creatinine based estimated GFR (eGFR) will be   calculated using the Chronic Kidney Disease Epidemiology Collaboration   (CKD-EPI) equation.      GFR Estimate If Black >90 >60 mL/min/[1.73_m2]      Comment:       GFR Calc  Starting 12/18/2018, serum creatinine based estimated GFR (eGFR) will be   calculated using the Chronic Kidney Disease Epidemiology Collaboration   (CKD-EPI) equation.      Calcium 8.8 8.5 - 10.1 mg/dL    Bilirubin Total 0.4 0.2 - 1.3 mg/dL    Albumin 3.1 (L) 3.4 - 5.0 g/dL    Protein Total 6.3 (L) 6.8 - 8.8 g/dL    Alkaline Phosphatase 127 40 - 150 U/L    ALT 18 0 - 50 U/L    AST 9 0 - 45 U/L   TSH   Result Value Ref Range    TSH 3.34 0.40 - 4.00 mU/L   T4 FREE   Result Value Ref Range    T4 Free 1.40 0.76 - 1.46 ng/dL   Hemoglobin A1c   Result Value Ref Range    Hemoglobin A1C 6.2 (H) 0 - 5.6 %      Comment:      Normal <5.7% Prediabetes 5.7-6.4%  Diabetes 6.5% or higher - adopted from ADA   consensus guidelines.     Albumin Random Urine Quantitative with Creat Ratio   Result Value Ref Range    Creatinine  Urine 62 mg/dL    Albumin Urine mg/L 45 mg/L    Albumin Urine mg/g Cr 72.21 (H) 0 - 25 mg/g Cr   Lipid panel reflex to direct LDL Fasting   Result Value Ref Range    Cholesterol 111 <200 mg/dL    Triglycerides 213 (H) <150 mg/dL      Comment:      Borderline high:  150-199 mg/dl  High:             200-499 mg/dl  Very high:       >499 mg/dl  Fasting specimen      HDL Cholesterol 32 (L) >49 mg/dL    LDL Cholesterol Calculated 36 <100 mg/dL      Comment:      Desirable:       <100 mg/dl    Non HDL Cholesterol 79 <130 mg/dL   CBC with platelets differential   Result Value Ref Range    WBC 6.9 4.0 - 11.0 10e9/L    RBC Count 5.85 (H) 3.8 - 5.2 10e12/L    Hemoglobin 14.3 11.7 - 15.7 g/dL    Hematocrit 49.1 (H) 35.0 - 47.0 %    MCV 84 78 - 100 fl    MCH 24.4 (L) 26.5 - 33.0 pg    MCHC 29.1 (L) 31.5 - 36.5 g/dL    RDW 21.8 (H) 10.0 - 15.0 %    Platelet Count 117 (L) 150 - 450 10e9/L    % Neutrophils 72.9 %    % Lymphocytes 18.9 %    % Monocytes 6.4 %    % Eosinophils 1.2 %    % Basophils 0.6 %    Absolute Neutrophil 5.1 1.6 - 8.3 10e9/L    Absolute Lymphocytes 1.3 0.8 - 5.3 10e9/L    Absolute Monocytes 0.4 0.0 - 1.3 10e9/L    Absolute Eosinophils 0.1 0.0 - 0.7 10e9/L    Absolute Basophils 0.0 0.0 - 0.2 10e9/L    Diff Method Automated Method        If you have any questions or concerns, please call the clinic at the number listed above.       Sincerely,        Gina Summers MD

## 2020-06-24 ENCOUNTER — VIRTUAL VISIT (OUTPATIENT)
Dept: ONCOLOGY | Facility: CLINIC | Age: 55
End: 2020-06-24
Attending: INTERNAL MEDICINE
Payer: COMMERCIAL

## 2020-06-24 DIAGNOSIS — D75.1 ERYTHROCYTOSIS: Primary | ICD-10-CM

## 2020-06-24 DIAGNOSIS — J44.1 COPD EXACERBATION (H): ICD-10-CM

## 2020-06-24 DIAGNOSIS — K21.9 GASTROESOPHAGEAL REFLUX DISEASE WITHOUT ESOPHAGITIS: Chronic | ICD-10-CM

## 2020-06-24 DIAGNOSIS — E03.9 HYPOTHYROIDISM, UNSPECIFIED TYPE: Chronic | ICD-10-CM

## 2020-06-24 DIAGNOSIS — E11.9 TYPE 2 DIABETES MELLITUS WITHOUT COMPLICATION, WITHOUT LONG-TERM CURRENT USE OF INSULIN (H): ICD-10-CM

## 2020-06-24 PROCEDURE — 99214 OFFICE O/P EST MOD 30 MIN: CPT | Mod: GT | Performed by: INTERNAL MEDICINE

## 2020-06-24 PROCEDURE — 40001009 ZZH VIDEO/TELEPHONE VISIT; NO CHARGE

## 2020-06-24 NOTE — LETTER
"    6/24/2020         RE: Bia Bill  [address]  [place] [address]        Dear Colleague,    Thank you for referring your patient, Bia Bill, to the [facility]. Please see a copy of my visit note below.    Bia Bill is a 54 year old female who is being evaluated via a billable video visit.      The patient has been notified of following:     \"This video visit will be conducted via a call between you and your physician/provider. We have found that certain health care needs can be provided without the need for an in-person physical exam.  This service lets us provide the care you need with a video conversation.  If a prescription is necessary we can send it directly to your pharmacy.  If lab work is needed we can place an order for that and you can then stop by our lab to have the test done at a later time.    Video visits are billed at different rates depending on your insurance coverage.  Please reach out to your insurance provider with any questions.    If during the course of the call the physician/provider feels a video visit is not appropriate, you will not be charged for this service.\"    Patient has given verbal consent for Video visit? Yes    How would you like to obtain your AVS? [facility]  Patient would like the video invitation sent by: Text to cell phone: [phone]    Will anyone else be joining your video visit? Meron Salazar [facility]          Hematology/ Oncology virtual video visit:  Jun 24, 2020    Due to the concerns around COVID-19 and adhering to social distancing we conducted this visit via video visit.      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 She was previously seen by Dr. Lomax on 10/09/2011, but has been lost from followup. The patient has had erythrocytosis with a hemoglobin around 20-23 and hemoglobin and hematocrit of 60-65% for most of her life. She had a previous workup by Dr. Lomax and also by another hematologist at [facility]. Review of her previous tests and workup showed a hemoglobin electrophoresis on 01/17/2007 that showed a hemoglobin A1 of 52%, A2 at 0, hemoglobin F at 1.4% and other hemoglobin 46.2%. JAK2 was negative on 07/31/2007. Hemoglobin affinity testing on 07/31/2007 showed a P50 of 27 (normal between 25 and 29). Repeat hemoglobin electrophoresis on 10/04/2011 showed the hemoglobin was at 39.9%. 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 She was also seen by Dr. Peres in 2007 and most of the workup is from there. Erythropoietin level was in the range of the 80s. Vitamin B12 and folic acid were within normal limits. Abdomen ultrasound did not show hepatosplenomegaly, but there is fatty infiltration of the liver. According to the chart, the patient had phlebotomies done in the past to keep her hemoglobin below 20 g/dl. This was done over the summertime of 2011. Most recently she didn't have any phlebotomy. She was started on anticoagulation with Coumadin as she was felt to be at high risk for blood clots and given history of severe arterial disease and stroke in the past. The patient is safe to use her anticoagulation and her most recent INR was 1.74. Evidently her carbon monoxide is also elevated. She is an active smoker, but she is cutting down from 2 packs per day to 3 cigarettes a day over the last one month. Patient denies any DVT or PE. She also evidently claims that her mother and her first son are also having the  same hemoglobinopathy and are being followed by their physicians. Bone marrow biopsy on 11/17/2014 showed no clonal chromosomal abnormality and no cytogenetic evidence of a malignant process. She also tested negative for the JAK2 mutation. Leukemia lymphoma evaluation additionally showed no aberrant immunophenotype on T cells and no increase in or abnormal appearing myeloid blasts.       Interval History:  Patient has been feeling well without any significant shortness of breath or cough or wheezing.  She denies any nausea vomiting or diarrhea.  She denies any recent weight loss.  She denies any chest pain or bone aches or pains.    Review of systems:  Pertinent positives have been included in HPI; remainder of detailed complete 20-point ROS was negative.    Past medical, social, surgical, and family histories reviewed.    Allergies:  Allergies as of 06/24/2020 - Reviewed 06/24/2020   Allergen Reaction Noted     Darvocet [propoxyphene n-apap] Anaphylaxis 07/26/2006     Percocet [oxycodone-acetaminophen] Anaphylaxis, Hives, and Swelling 06/01/2016     Asa [aspirin] Hives 07/10/2006     Bee  03/26/2010     Ibuprofen Swelling 03/30/2018     Lyrica [pregabalin] Other (See Comments) and Swelling 12/04/2015     Povidone iodine Rash 01/25/2017     Tape [adhesive tape] Rash 08/03/2006       Current Medications:  Current Outpatient Medications   Medication Sig Dispense Refill     acetaminophen (TYLENOL) 500 MG tablet Take 500-1,000 mg by mouth every 6 hours as needed for mild pain       ADVAIR -21 MCG/ACT inhaler INHALE TWO PUFFS BY MOUTH TWICE A DAY 12 g 3     albuterol (PROAIR HFA/PROVENTIL HFA/VENTOLIN HFA) 108 (90 Base) MCG/ACT inhaler INHALE TWO PUFFS BY MOUTH EVERY 6 HOURS AS NEEDED FOR SHORTNESS OF BREATH DIFFICULTY BREATHING OR WHEEZING 18 g 0     albuterol (PROVENTIL) (2.5 MG/3ML) 0.083% neb solution Take 1 vial (2.5 mg) by nebulization every 6 hours as needed for shortness of breath / dyspnea or wheezing 1  Box 0     alcohol swab prep pads Use to swab area of injection/mike as directed. 100 each 3     allopurinol (ZYLOPRIM) 300 MG tablet TAKE ONE TABLET BY MOUTH ONCE DAILY 90 tablet 1     ARIPiprazole (ABILIFY) 10 MG tablet TAKE ONE TABLET BY MOUTH ONCE DAILY 90 tablet 1     buPROPion (WELLBUTRIN XL) 150 MG 24 hr tablet TAKE 1 TABLET BY MOUTH EVERY EVENING 150 tablet 0     furosemide (LASIX) 40 MG tablet Take 1 tablet (40 mg) by mouth 2 times daily 180 tablet 4     gabapentin (NEURONTIN) 300 MG capsule Take 1 capsule (300 mg) by mouth At Bedtime TAKE ONE CAPSULE BY MOUTH ONCE DAILY AT BEDTIME WITH A 600 MG TABLET FOR A TOTAL DOSE  MG 90 capsule 3     gabapentin (NEURONTIN) 600 MG tablet TAKE ONE TABLET BY MOUTH THREE TIMES A DAY 90 tablet 3     ipratropium - albuterol 0.5 mg/2.5 mg/3 mL (DUONEB) 0.5-2.5 (3) MG/3ML neb solution Take 1 vial (3 mLs) by nebulization every 6 hours as needed for shortness of breath / dyspnea or wheezing 30 vial 11     lamoTRIgine (LAMICTAL) 100 MG tablet TAKE ONE TABLET BY MOUTH ONCE DAILY 90 tablet 1     levothyroxine (SYNTHROID/LEVOTHROID) 175 MCG tablet TAKE ONE TABLET BY MOUTH ONCE DAILY 90 tablet 0     metFORMIN (GLUCOPHAGE) 1000 MG tablet Take 1 tablet (1,000 mg) by mouth 2 times daily (with meals) 180 tablet 1     mometasone-formoterol (DULERA) 200-5 MCG/ACT oral inhaler Inhale 2 puffs into the lungs 2 times daily 13 g 1     omeprazole (PRILOSEC) 20 MG DR capsule Take 1 capsule (20 mg) by mouth 2 times daily 60 capsule 11     potassium chloride ER (KLOR-CON M) 10 MEQ CR tablet TAKE ONE TABLET BY MOUTH ONCE DAILY 90 tablet 2     pramipexole (MIRAPEX) 0.125 MG tablet Take 1-20 tablets (0.125-2.5 mg) by mouth At Bedtime TAKE 1-2 TABLETS BY MOUTH AT BEDTIME 180 tablet 1     simvastatin (ZOCOR) 20 MG tablet TAKE ONE TABLET BY MOUTH EVERY NIGHT AT BEDTIME (Patient taking differently: Take 20 mg by mouth At Bedtime ) 90 tablet 3     traZODone (DESYREL) 150 MG tablet TAKE ONE TABLET BY  MOUTH EVERY NIGHT AT BEDTIME 90 tablet 1     VITAMIN D3 1000 units tablet TAKE ONE TABLET BY MOUTH ONCE DAILY 100 tablet 2     warfarin ANTICOAGULANT (JANTOVEN ANTICOAGULANT) 5 MG tablet TAKE TWO AND ONE-HALF TABLETS BY MOUTH EVERY MONDAY, TWO TABLETS ON ALL OTHER DAYS OR AS DIRECTED BY ANTICOAGULATION CLINIC 175 tablet 0     blood glucose (NO BRAND SPECIFIED) test strip Use to test blood sugar 2-3x/day. To accompany: Blood Glucose Monitor Brands: per insurance. 100 strip 11     blood glucose calibration (NO BRAND SPECIFIED) solution To accompany: Blood Glucose Monitor Brands: per insurance. 1 Bottle 3     blood glucose monitoring (NO BRAND SPECIFIED) meter device kit Use to test blood sugar daily or as directed. Preferred blood glucose meter OR supplies to accompany: Blood Glucose Monitor Brands: per insurance. 1 kit 0     EPINEPHrine (ANY BX GENERIC EQUIV) 0.3 MG/0.3ML injection 2-pack Inject 0.3 mLs (0.3 mg) into the muscle once as needed for anaphylaxis (Patient not taking: Reported on 6/24/2020) 0.6 mL 0     order for DME Equipment being ordered:x2 [employer] 30/40mmHg compression wraps with x2 extra prs of compression liners 1 each 0     order for DME Equipment being ordered: Nebulizer 1 Device 0     order for DME Equipment being ordered: CPAP  AIRSENSE 10  5-18 CM H20  SN# [id number]   DN# [id number]       order for DME Equipment being ordered: DEPENDS SIZE LARGE 60 Month 5     order for DME Equipment being ordered: INCONTINENCE PADS   QID 1 Month 11     thin (NO BRAND SPECIFIED) lancets Use with lanceting device. To accompany: Blood Glucose Monitor Brands: per insurance. 1 each 6     trimethoprim-polymyxin b (POLYTRIM) 64144-0.1 UNIT/ML-% ophthalmic solution Place 1-2 drops Into the left eye every 6 hours (Patient not taking: Reported on 6/24/2020) 10 mL 0        Physical examination:  Physical Exam as observed via telehealth:         Constitutional - General appearance, and body habitus are within normal range          Eyes -there is no redness, or discharge seen.         Respiratory -there is no cough, or labored breathing observed         Musculoskeletal -full range of motion is observed         Skin -there is no visible discoloration, or visible lesions         Neurological -there is tremors is observed         Psychiatric -the patient is alert & oriented.    The rest of a comprehensive physical examination is deferred due to PHE (public health emergency) video visit restrictions.    Laboratory/Imaging Studies:  Office Visit on 06/22/2020   Component Date Value Ref Range Status     Sodium 06/22/2020 137  133 - 144 mmol/L Final     Potassium 06/22/2020 3.9  3.4 - 5.3 mmol/L Final     Chloride 06/22/2020 101  94 - 109 mmol/L Final     Carbon Dioxide 06/22/2020 32  20 - 32 mmol/L Final     Anion Gap 06/22/2020 4  3 - 14 mmol/L Final     Glucose 06/22/2020 113* 70 - 99 mg/dL Final    Fasting specimen     Urea Nitrogen 06/22/2020 12  7 - 30 mg/dL Final     Creatinine 06/22/2020 0.71  0.52 - 1.04 mg/dL Final     GFR Estimate 06/22/2020 >90  >60 mL/min/[1.73_m2] Final    Comment: Non  GFR Calc  Starting 12/18/2018, serum creatinine based estimated GFR (eGFR) will be   calculated using the Chronic Kidney Disease Epidemiology Collaboration   (CKD-EPI) equation.       GFR Estimate If Black 06/22/2020 >90  >60 mL/min/[1.73_m2] Final    Comment:  GFR Calc  Starting 12/18/2018, serum creatinine based estimated GFR (eGFR) will be   calculated using the Chronic Kidney Disease Epidemiology Collaboration   (CKD-EPI) equation.       Calcium 06/22/2020 8.8  8.5 - 10.1 mg/dL Final     Bilirubin Total 06/22/2020 0.4  0.2 - 1.3 mg/dL Final     Albumin 06/22/2020 3.1* 3.4 - 5.0 g/dL Final     Protein Total 06/22/2020 6.3* 6.8 - 8.8 g/dL Final     Alkaline Phosphatase 06/22/2020 127  40 - 150 U/L Final     ALT 06/22/2020 18  0 - 50 U/L Final     AST 06/22/2020 9  0 - 45 U/L Final     TSH 06/22/2020 3.34  0.40 - 4.00  mU/L Final     T4 Free 06/22/2020 1.40  0.76 - 1.46 ng/dL Final     Hemoglobin A1C 06/22/2020 6.2* 0 - 5.6 % Final    Comment: Normal <5.7% Prediabetes 5.7-6.4%  Diabetes 6.5% or higher - adopted from ADA   consensus guidelines.       Creatinine Urine 06/22/2020 62  mg/dL Final     Albumin Urine mg/L 06/22/2020 45  mg/L Final     Albumin Urine mg/g Cr 06/22/2020 72.21* 0 - 25 mg/g Cr Final     Cholesterol 06/22/2020 111  <200 mg/dL Final     Triglycerides 06/22/2020 213* <150 mg/dL Final    Comment: Borderline high:  150-199 mg/dl  High:             200-499 mg/dl  Very high:       >499 mg/dl  Fasting specimen       HDL Cholesterol 06/22/2020 32* >49 mg/dL Final     LDL Cholesterol Calculated 06/22/2020 36  <100 mg/dL Final    Desirable:       <100 mg/dl     Non HDL Cholesterol 06/22/2020 79  <130 mg/dL Final     WBC 06/22/2020 6.9  4.0 - 11.0 10e9/L Final     RBC Count 06/22/2020 5.85* 3.8 - 5.2 10e12/L Final     Hemoglobin 06/22/2020 14.3  11.7 - 15.7 g/dL Final     Hematocrit 06/22/2020 49.1* 35.0 - 47.0 % Final     MCV 06/22/2020 84  78 - 100 fl Final     MCH 06/22/2020 24.4* 26.5 - 33.0 pg Final     MCHC 06/22/2020 29.1* 31.5 - 36.5 g/dL Final     RDW 06/22/2020 21.8* 10.0 - 15.0 % Final     Platelet Count 06/22/2020 117* 150 - 450 10e9/L Final     % Neutrophils 06/22/2020 72.9  % Final     % Lymphocytes 06/22/2020 18.9  % Final     % Monocytes 06/22/2020 6.4  % Final     % Eosinophils 06/22/2020 1.2  % Final     % Basophils 06/22/2020 0.6  % Final     Absolute Neutrophil 06/22/2020 5.1  1.6 - 8.3 10e9/L Final     Absolute Lymphocytes 06/22/2020 1.3  0.8 - 5.3 10e9/L Final     Absolute Monocytes 06/22/2020 0.4  0.0 - 1.3 10e9/L Final     Absolute Eosinophils 06/22/2020 0.1  0.0 - 0.7 10e9/L Final     Absolute Basophils 06/22/2020 0.0  0.0 - 0.2 10e9/L Final     Diff Method 06/22/2020 Automated Method   Final   Orders Only on 06/19/2020   Component Date Value Ref Range Status     WBC 06/19/2020 6.0  4.0 - 11.0  10e9/L Final     RBC Count 06/19/2020 5.59* 3.8 - 5.2 10e12/L Final     Hemoglobin 06/19/2020 13.5  11.7 - 15.7 g/dL Final     Hematocrit 06/19/2020 47.4* 35.0 - 47.0 % Final     MCV 06/19/2020 85  78 - 100 fl Final     MCH 06/19/2020 24.2* 26.5 - 33.0 pg Final     MCHC 06/19/2020 28.5* 31.5 - 36.5 g/dL Final     RDW 06/19/2020 21.7* 10.0 - 15.0 % Final     Platelet Count 06/19/2020 110* 150 - 450 10e9/L Final     % Neutrophils 06/19/2020 68.4  % Final     % Lymphocytes 06/19/2020 23.2  % Final     % Monocytes 06/19/2020 6.8  % Final     % Eosinophils 06/19/2020 1.3  % Final     % Basophils 06/19/2020 0.3  % Final     Absolute Neutrophil 06/19/2020 4.1  1.6 - 8.3 10e9/L Final     Absolute Lymphocytes 06/19/2020 1.4  0.8 - 5.3 10e9/L Final     Absolute Monocytes 06/19/2020 0.4  0.0 - 1.3 10e9/L Final     Absolute Eosinophils 06/19/2020 0.1  0.0 - 0.7 10e9/L Final     Absolute Basophils 06/19/2020 0.0  0.0 - 0.2 10e9/L Final     Diff Method 06/19/2020 Automated Method   Final     INR 06/19/2020 2.05* 0.86 - 1.14 Final     TSH 06/19/2020 4.00  0.40 - 4.00 mU/L Final        No results found for this or any previous visit (from the past 744 hour(s)).    Assessment and plan:    (D75.1) Erythrocytosis  (primary encounter diagnosis)  I reviewed with the patient today most recent laboratory test.  Hematocrit today is 49.  We recommend to continue with phlebotomy to keep hematocrit around 45.  I will arrange for CBC monthly and offer phlebotomy as needed.  I will see the patient again in 6 months or sooner if there are new developments or concerns.    (J44.1) COPD exacerbation (H)  Patient currently on Advair and albuterol inhalers.  Patient also on  nebulizer treatment.    (E11.9) Type 2 diabetes mellitus without complication, without long-term current use of insulin (H)  Diabetes is currently well controlled.  She is currently on metformin 1000 mg twice daily.    (K21.9) Gastroesophageal reflux disease without  esophagitis  Patient currently on omeprazole 20 mg orally daily.    (E03.9) Hypothyroidism, unspecified type  Patient currently on Synthroid 75 mcg orally daily.    The patient is ready to learn, no apparent learning barriers were identified.  Diagnosis and treatment plans were explained to the patient. The patient expressed understanding of the content. The patient asked appropriate questions. The patient questions were answered to her satisfaction.    This is started 11:45 AM  Visit ended at 12:10 PM    Josuha Watts MD    Chart documentation with Dragon Voice recognition Software. Although reviewed after completion, some words and grammatical errors may remain.    Again, thank you for allowing me to participate in the care of your patient.        Sincerely,        Joshua Watts MD

## 2020-06-24 NOTE — PATIENT INSTRUCTIONS
CBC monthly.  Follow-up hematocrit.  Offer phlebotomy monthly if hematocrit is more than 45%.  Follow-up in 6 months

## 2020-06-24 NOTE — LETTER
"    6/24/2020         RE: Bia Bill  [address]  [facility] [address]        Dear Colleague,    Thank you for referring your patient, Bia Bill, to the [facility]. Please see a copy of my visit note below.    Bia Bill is a 54 year old female who is being evaluated via a billable video visit.      The patient has been notified of following:     \"This video visit will be conducted via a call between you and your physician/provider. We have found that certain health care needs can be provided without the need for an in-person physical exam.  This service lets us provide the care you need with a video conversation.  If a prescription is necessary we can send it directly to your pharmacy.  If lab work is needed we can place an order for that and you can then stop by our lab to have the test done at a later time.    Video visits are billed at different rates depending on your insurance coverage.  Please reach out to your insurance provider with any questions.    If during the course of the call the physician/provider feels a video visit is not appropriate, you will not be charged for this service.\"    Patient has given verbal consent for Video visit? Yes    How would you like to obtain your AVS? [facility]  Patient would like the video invitation sent by: Text to cell phone: [phone]    Will anyone else be joining your video visit? Meron Salazar [facility]          Hematology/ Oncology virtual video visit:  Jun 24, 2020    Due to the concerns around COVID-19 and adhering to social distancing we conducted this visit via video visit.      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 She was previously seen by Dr. Lomax on 10/09/2011, but has been lost from followup. The patient has had erythrocytosis with a hemoglobin around 20-23 and hemoglobin and hematocrit of 60-65% for most of her life. She had a previous workup by Dr. Lomax and also by another hematologist at [facility]. Review of her previous tests and workup showed a hemoglobin electrophoresis on 01/17/2007 that showed a hemoglobin A1 of 52%, A2 at 0, hemoglobin F at 1.4% and other hemoglobin 46.2%. JAK2 was negative on 07/31/2007. Hemoglobin affinity testing on 07/31/2007 showed a P50 of 27 (normal between 25 and 29). Repeat hemoglobin electrophoresis on 10/04/2011 showed the hemoglobin was at 39.9%. This illuminated hemoglobin was suggestive of beta globin variant of hemoglobin Hb [place]. She was also seen by Dr. Peres in 2007 and most of the workup is from there. Erythropoietin level was in the range of the 80s. Vitamin B12 and folic acid were within normal limits. Abdomen ultrasound did not show hepatosplenomegaly, but there is fatty infiltration of the liver. According to the chart, the patient had phlebotomies done in the past to keep her hemoglobin below 20 g/dl. This was done over the summertime of 2011. Most recently she didn't have any phlebotomy. She was started on anticoagulation with Coumadin as she was felt to be at high risk for blood clots and given history of severe arterial disease and stroke in the past. The patient is safe to use her anticoagulation and her most recent INR was 1.74. Evidently her carbon monoxide is also elevated. She is an active smoker, but she is cutting down from 2 packs per day to 3 cigarettes a day over the last one month. Patient denies any DVT or PE. She also evidently claims that her mother and her first son are also having the  same hemoglobinopathy and are being followed by their physicians. Bone marrow biopsy on 11/17/2014 showed no clonal chromosomal abnormality and no cytogenetic evidence of a malignant process. She also tested negative for the JAK2 mutation. Leukemia lymphoma evaluation additionally showed no aberrant immunophenotype on T cells and no increase in or abnormal appearing myeloid blasts.       Interval History:  Patient has been feeling well without any significant shortness of breath or cough or wheezing.  She denies any nausea vomiting or diarrhea.  She denies any recent weight loss.  She denies any chest pain or bone aches or pains.    Review of systems:  Pertinent positives have been included in HPI; remainder of detailed complete 20-point ROS was negative.    Past medical, social, surgical, and family histories reviewed.    Allergies:  Allergies as of 06/24/2020 - Reviewed 06/24/2020   Allergen Reaction Noted     Darvocet [propoxyphene n-apap] Anaphylaxis 07/26/2006     Percocet [oxycodone-acetaminophen] Anaphylaxis, Hives, and Swelling 06/01/2016     Asa [aspirin] Hives 07/10/2006     Bee  03/26/2010     Ibuprofen Swelling 03/30/2018     Lyrica [pregabalin] Other (See Comments) and Swelling 12/04/2015     Povidone iodine Rash 01/25/2017     Tape [adhesive tape] Rash 08/03/2006       Current Medications:  Current Outpatient Medications   Medication Sig Dispense Refill     acetaminophen (TYLENOL) 500 MG tablet Take 500-1,000 mg by mouth every 6 hours as needed for mild pain       ADVAIR -21 MCG/ACT inhaler INHALE TWO PUFFS BY MOUTH TWICE A DAY 12 g 3     albuterol (PROAIR HFA/PROVENTIL HFA/VENTOLIN HFA) 108 (90 Base) MCG/ACT inhaler INHALE TWO PUFFS BY MOUTH EVERY 6 HOURS AS NEEDED FOR SHORTNESS OF BREATH DIFFICULTY BREATHING OR WHEEZING 18 g 0     albuterol (PROVENTIL) (2.5 MG/3ML) 0.083% neb solution Take 1 vial (2.5 mg) by nebulization every 6 hours as needed for shortness of breath / dyspnea or wheezing 1  Box 0     alcohol swab prep pads Use to swab area of injection/mike as directed. 100 each 3     allopurinol (ZYLOPRIM) 300 MG tablet TAKE ONE TABLET BY MOUTH ONCE DAILY 90 tablet 1     ARIPiprazole (ABILIFY) 10 MG tablet TAKE ONE TABLET BY MOUTH ONCE DAILY 90 tablet 1     buPROPion (WELLBUTRIN XL) 150 MG 24 hr tablet TAKE 1 TABLET BY MOUTH EVERY EVENING 150 tablet 0     furosemide (LASIX) 40 MG tablet Take 1 tablet (40 mg) by mouth 2 times daily 180 tablet 4     gabapentin (NEURONTIN) 300 MG capsule Take 1 capsule (300 mg) by mouth At Bedtime TAKE ONE CAPSULE BY MOUTH ONCE DAILY AT BEDTIME WITH A 600 MG TABLET FOR A TOTAL DOSE  MG 90 capsule 3     gabapentin (NEURONTIN) 600 MG tablet TAKE ONE TABLET BY MOUTH THREE TIMES A DAY 90 tablet 3     ipratropium - albuterol 0.5 mg/2.5 mg/3 mL (DUONEB) 0.5-2.5 (3) MG/3ML neb solution Take 1 vial (3 mLs) by nebulization every 6 hours as needed for shortness of breath / dyspnea or wheezing 30 vial 11     lamoTRIgine (LAMICTAL) 100 MG tablet TAKE ONE TABLET BY MOUTH ONCE DAILY 90 tablet 1     levothyroxine (SYNTHROID/LEVOTHROID) 175 MCG tablet TAKE ONE TABLET BY MOUTH ONCE DAILY 90 tablet 0     metFORMIN (GLUCOPHAGE) 1000 MG tablet Take 1 tablet (1,000 mg) by mouth 2 times daily (with meals) 180 tablet 1     mometasone-formoterol (DULERA) 200-5 MCG/ACT oral inhaler Inhale 2 puffs into the lungs 2 times daily 13 g 1     omeprazole (PRILOSEC) 20 MG DR capsule Take 1 capsule (20 mg) by mouth 2 times daily 60 capsule 11     potassium chloride ER (KLOR-CON M) 10 MEQ CR tablet TAKE ONE TABLET BY MOUTH ONCE DAILY 90 tablet 2     pramipexole (MIRAPEX) 0.125 MG tablet Take 1-20 tablets (0.125-2.5 mg) by mouth At Bedtime TAKE 1-2 TABLETS BY MOUTH AT BEDTIME 180 tablet 1     simvastatin (ZOCOR) 20 MG tablet TAKE ONE TABLET BY MOUTH EVERY NIGHT AT BEDTIME (Patient taking differently: Take 20 mg by mouth At Bedtime ) 90 tablet 3     traZODone (DESYREL) 150 MG tablet TAKE ONE TABLET BY  MOUTH EVERY NIGHT AT BEDTIME 90 tablet 1     VITAMIN D3 1000 units tablet TAKE ONE TABLET BY MOUTH ONCE DAILY 100 tablet 2     warfarin ANTICOAGULANT (JANTOVEN ANTICOAGULANT) 5 MG tablet TAKE TWO AND ONE-HALF TABLETS BY MOUTH EVERY MONDAY, TWO TABLETS ON ALL OTHER DAYS OR AS DIRECTED BY ANTICOAGULATION CLINIC 175 tablet 0     blood glucose (NO BRAND SPECIFIED) test strip Use to test blood sugar 2-3x/day. To accompany: Blood Glucose Monitor Brands: per insurance. 100 strip 11     blood glucose calibration (NO BRAND SPECIFIED) solution To accompany: Blood Glucose Monitor Brands: per insurance. 1 Bottle 3     blood glucose monitoring (NO BRAND SPECIFIED) meter device kit Use to test blood sugar daily or as directed. Preferred blood glucose meter OR supplies to accompany: Blood Glucose Monitor Brands: per insurance. 1 kit 0     EPINEPHrine (ANY BX GENERIC EQUIV) 0.3 MG/0.3ML injection 2-pack Inject 0.3 mLs (0.3 mg) into the muscle once as needed for anaphylaxis (Patient not taking: Reported on 6/24/2020) 0.6 mL 0     order for DME Equipment being ordered:x2 [employer] 30/40mmHg compression wraps with x2 extra prs of compression liners 1 each 0     order for DME Equipment being ordered: Nebulizer 1 Device 0     order for DME Equipment being ordered: CPAP  AIRSENSE 10  5-18 CM H20  SN# [id number]   DN# [id number]       order for DME Equipment being ordered: DEPENDS SIZE LARGE 60 Month 5     order for DME Equipment being ordered: INCONTINENCE PADS   QID 1 Month 11     thin (NO BRAND SPECIFIED) lancets Use with lanceting device. To accompany: Blood Glucose Monitor Brands: per insurance. 1 each 6     trimethoprim-polymyxin b (POLYTRIM) 01425-0.1 UNIT/ML-% ophthalmic solution Place 1-2 drops Into the left eye every 6 hours (Patient not taking: Reported on 6/24/2020) 10 mL 0        Physical examination:  Physical Exam as observed via telehealth:         Constitutional - General appearance, and body habitus are within normal range          Eyes -there is no redness, or discharge seen.         Respiratory -there is no cough, or labored breathing observed         Musculoskeletal -full range of motion is observed         Skin -there is no visible discoloration, or visible lesions         Neurological -there is tremors is observed         Psychiatric -the patient is alert & oriented.    The rest of a comprehensive physical examination is deferred due to PHE (public health emergency) video visit restrictions.    Laboratory/Imaging Studies:  Office Visit on 06/22/2020   Component Date Value Ref Range Status     Sodium 06/22/2020 137  133 - 144 mmol/L Final     Potassium 06/22/2020 3.9  3.4 - 5.3 mmol/L Final     Chloride 06/22/2020 101  94 - 109 mmol/L Final     Carbon Dioxide 06/22/2020 32  20 - 32 mmol/L Final     Anion Gap 06/22/2020 4  3 - 14 mmol/L Final     Glucose 06/22/2020 113* 70 - 99 mg/dL Final    Fasting specimen     Urea Nitrogen 06/22/2020 12  7 - 30 mg/dL Final     Creatinine 06/22/2020 0.71  0.52 - 1.04 mg/dL Final     GFR Estimate 06/22/2020 >90  >60 mL/min/[1.73_m2] Final    Comment: Non  GFR Calc  Starting 12/18/2018, serum creatinine based estimated GFR (eGFR) will be   calculated using the Chronic Kidney Disease Epidemiology Collaboration   (CKD-EPI) equation.       GFR Estimate If Black 06/22/2020 >90  >60 mL/min/[1.73_m2] Final    Comment:  GFR Calc  Starting 12/18/2018, serum creatinine based estimated GFR (eGFR) will be   calculated using the Chronic Kidney Disease Epidemiology Collaboration   (CKD-EPI) equation.       Calcium 06/22/2020 8.8  8.5 - 10.1 mg/dL Final     Bilirubin Total 06/22/2020 0.4  0.2 - 1.3 mg/dL Final     Albumin 06/22/2020 3.1* 3.4 - 5.0 g/dL Final     Protein Total 06/22/2020 6.3* 6.8 - 8.8 g/dL Final     Alkaline Phosphatase 06/22/2020 127  40 - 150 U/L Final     ALT 06/22/2020 18  0 - 50 U/L Final     AST 06/22/2020 9  0 - 45 U/L Final     TSH 06/22/2020 3.34  0.40 - 4.00  mU/L Final     T4 Free 06/22/2020 1.40  0.76 - 1.46 ng/dL Final     Hemoglobin A1C 06/22/2020 6.2* 0 - 5.6 % Final    Comment: Normal <5.7% Prediabetes 5.7-6.4%  Diabetes 6.5% or higher - adopted from ADA   consensus guidelines.       Creatinine Urine 06/22/2020 62  mg/dL Final     Albumin Urine mg/L 06/22/2020 45  mg/L Final     Albumin Urine mg/g Cr 06/22/2020 72.21* 0 - 25 mg/g Cr Final     Cholesterol 06/22/2020 111  <200 mg/dL Final     Triglycerides 06/22/2020 213* <150 mg/dL Final    Comment: Borderline high:  150-199 mg/dl  High:             200-499 mg/dl  Very high:       >499 mg/dl  Fasting specimen       HDL Cholesterol 06/22/2020 32* >49 mg/dL Final     LDL Cholesterol Calculated 06/22/2020 36  <100 mg/dL Final    Desirable:       <100 mg/dl     Non HDL Cholesterol 06/22/2020 79  <130 mg/dL Final     WBC 06/22/2020 6.9  4.0 - 11.0 10e9/L Final     RBC Count 06/22/2020 5.85* 3.8 - 5.2 10e12/L Final     Hemoglobin 06/22/2020 14.3  11.7 - 15.7 g/dL Final     Hematocrit 06/22/2020 49.1* 35.0 - 47.0 % Final     MCV 06/22/2020 84  78 - 100 fl Final     MCH 06/22/2020 24.4* 26.5 - 33.0 pg Final     MCHC 06/22/2020 29.1* 31.5 - 36.5 g/dL Final     RDW 06/22/2020 21.8* 10.0 - 15.0 % Final     Platelet Count 06/22/2020 117* 150 - 450 10e9/L Final     % Neutrophils 06/22/2020 72.9  % Final     % Lymphocytes 06/22/2020 18.9  % Final     % Monocytes 06/22/2020 6.4  % Final     % Eosinophils 06/22/2020 1.2  % Final     % Basophils 06/22/2020 0.6  % Final     Absolute Neutrophil 06/22/2020 5.1  1.6 - 8.3 10e9/L Final     Absolute Lymphocytes 06/22/2020 1.3  0.8 - 5.3 10e9/L Final     Absolute Monocytes 06/22/2020 0.4  0.0 - 1.3 10e9/L Final     Absolute Eosinophils 06/22/2020 0.1  0.0 - 0.7 10e9/L Final     Absolute Basophils 06/22/2020 0.0  0.0 - 0.2 10e9/L Final     Diff Method 06/22/2020 Automated Method   Final   Orders Only on 06/19/2020   Component Date Value Ref Range Status     WBC 06/19/2020 6.0  4.0 - 11.0  10e9/L Final     RBC Count 06/19/2020 5.59* 3.8 - 5.2 10e12/L Final     Hemoglobin 06/19/2020 13.5  11.7 - 15.7 g/dL Final     Hematocrit 06/19/2020 47.4* 35.0 - 47.0 % Final     MCV 06/19/2020 85  78 - 100 fl Final     MCH 06/19/2020 24.2* 26.5 - 33.0 pg Final     MCHC 06/19/2020 28.5* 31.5 - 36.5 g/dL Final     RDW 06/19/2020 21.7* 10.0 - 15.0 % Final     Platelet Count 06/19/2020 110* 150 - 450 10e9/L Final     % Neutrophils 06/19/2020 68.4  % Final     % Lymphocytes 06/19/2020 23.2  % Final     % Monocytes 06/19/2020 6.8  % Final     % Eosinophils 06/19/2020 1.3  % Final     % Basophils 06/19/2020 0.3  % Final     Absolute Neutrophil 06/19/2020 4.1  1.6 - 8.3 10e9/L Final     Absolute Lymphocytes 06/19/2020 1.4  0.8 - 5.3 10e9/L Final     Absolute Monocytes 06/19/2020 0.4  0.0 - 1.3 10e9/L Final     Absolute Eosinophils 06/19/2020 0.1  0.0 - 0.7 10e9/L Final     Absolute Basophils 06/19/2020 0.0  0.0 - 0.2 10e9/L Final     Diff Method 06/19/2020 Automated Method   Final     INR 06/19/2020 2.05* 0.86 - 1.14 Final     TSH 06/19/2020 4.00  0.40 - 4.00 mU/L Final        No results found for this or any previous visit (from the past 744 hour(s)).    Assessment and plan:    (D75.1) Erythrocytosis  (primary encounter diagnosis)  I reviewed with the patient today most recent laboratory test.  Hematocrit today is 49.  We recommend to continue with phlebotomy to keep hematocrit around 45.  I will arrange for CBC monthly and offer phlebotomy as needed.  I will see the patient again in 6 months or sooner if there are new developments or concerns.    (J44.1) COPD exacerbation (H)  Patient currently on Advair and albuterol inhalers.  Patient also on  nebulizer treatment.    (E11.9) Type 2 diabetes mellitus without complication, without long-term current use of insulin (H)  Diabetes is currently well controlled.  She is currently on metformin 1000 mg twice daily.    (K21.9) Gastroesophageal reflux disease without  esophagitis  Patient currently on omeprazole 20 mg orally daily.    (E03.9) Hypothyroidism, unspecified type  Patient currently on Synthroid 75 mcg orally daily.    The patient is ready to learn, no apparent learning barriers were identified.  Diagnosis and treatment plans were explained to the patient. The patient expressed understanding of the content. The patient asked appropriate questions. The patient questions were answered to her satisfaction.    This is started 11:45 AM  Visit ended at 12:10 PM    Joshua Watts MD    Chart documentation with Dragon Voice recognition Software. Although reviewed after completion, some words and grammatical errors may remain.    Again, thank you for allowing me to participate in the care of your patient.        Sincerely,        Joshua Watts MD

## 2020-06-24 NOTE — PROGRESS NOTES
"Bia Bill is a 54 year old female who is being evaluated via a billable video visit.      The patient has been notified of following:     \"This video visit will be conducted via a call between you and your physician/provider. We have found that certain health care needs can be provided without the need for an in-person physical exam.  This service lets us provide the care you need with a video conversation.  If a prescription is necessary we can send it directly to your pharmacy.  If lab work is needed we can place an order for that and you can then stop by our lab to have the test done at a later time.    Video visits are billed at different rates depending on your insurance coverage.  Please reach out to your insurance provider with any questions.    If during the course of the call the physician/provider feels a video visit is not appropriate, you will not be charged for this service.\"    Patient has given verbal consent for Video visit? Yes    How would you like to obtain your AVS? [facility]  Patient would like the video invitation sent by: Text to cell phone: [phone]    Will anyone else be joining your video visit? Meron Salazar CMA      "

## 2020-06-24 NOTE — PROGRESS NOTES
Hematology/ Oncology virtual video visit:  Jun 24, 2020    Due to the concerns around COVID-19 and adhering to social distancing we conducted this visit via video visit.      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 She was previously seen by Dr. Lomax on 10/09/2011, but has been lost from followup. The patient has had erythrocytosis with a hemoglobin around 20-23 and hemoglobin and hematocrit of 60-65% for most of her life. She had a previous workup by Dr. Lomax and also by another hematologist at [facility]. Review of her previous tests and workup showed a hemoglobin electrophoresis on 01/17/2007 that showed a hemoglobin A1 of 52%, A2 at 0, hemoglobin F at 1.4% and other hemoglobin 46.2%. JAK2 was negative on 07/31/2007. Hemoglobin affinity testing on 07/31/2007 showed a P50 of 27 (normal between 25 and 29). Repeat hemoglobin electrophoresis on 10/04/2011 showed the hemoglobin was at 39.9%. This illuminated hemoglobin was suggestive of beta globin variant of hemoglobin Hb [place]. She was also seen by Dr. Peres in 2007 and most of the workup is from there. Erythropoietin level was in the range of the 80s. Vitamin B12 and folic acid were within normal limits. Abdomen ultrasound did not show hepatosplenomegaly, but there is fatty infiltration of the liver. According to the chart, the patient had phlebotomies done in the past to keep her hemoglobin below 20 g/dl. This was done over the summertime of 2011. Most recently she didn't have any phlebotomy. She was  started on anticoagulation with Coumadin as she was felt to be at high risk for blood clots and given history of severe arterial disease and stroke in the past. The patient is safe to use her anticoagulation and her most recent INR was 1.74. Evidently her carbon monoxide is also elevated. She is an active smoker, but she is cutting down from 2 packs per day to 3 cigarettes a day over the last one month. Patient denies any DVT or PE. She also evidently claims that her mother and her first son are also having the same hemoglobinopathy and are being followed by their physicians. Bone marrow biopsy on 11/17/2014 showed no clonal chromosomal abnormality and no cytogenetic evidence of a malignant process. She also tested negative for the JAK2 mutation. Leukemia lymphoma evaluation additionally showed no aberrant immunophenotype on T cells and no increase in or abnormal appearing myeloid blasts.       Interval History:  Patient has been feeling well without any significant shortness of breath or cough or wheezing.  She denies any nausea vomiting or diarrhea.  She denies any recent weight loss.  She denies any chest pain or bone aches or pains.    Review of systems:  Pertinent positives have been included in HPI; remainder of detailed complete 20-point ROS was negative.    Past medical, social, surgical, and family histories reviewed.    Allergies:  Allergies as of 06/24/2020 - Reviewed 06/24/2020   Allergen Reaction Noted     Darvocet [propoxyphene n-apap] Anaphylaxis 07/26/2006     Percocet [oxycodone-acetaminophen] Anaphylaxis, Hives, and Swelling 06/01/2016     Asa [aspirin] Hives 07/10/2006     Bee  03/26/2010     Ibuprofen Swelling 03/30/2018     Lyrica [pregabalin] Other (See Comments) and Swelling 12/04/2015     Povidone iodine Rash 01/25/2017     Tape [adhesive tape] Rash 08/03/2006       Current Medications:  Current Outpatient Medications   Medication Sig Dispense Refill     acetaminophen (TYLENOL) 500 MG tablet  Take 500-1,000 mg by mouth every 6 hours as needed for mild pain       ADVAIR -21 MCG/ACT inhaler INHALE TWO PUFFS BY MOUTH TWICE A DAY 12 g 3     albuterol (PROAIR HFA/PROVENTIL HFA/VENTOLIN HFA) 108 (90 Base) MCG/ACT inhaler INHALE TWO PUFFS BY MOUTH EVERY 6 HOURS AS NEEDED FOR SHORTNESS OF BREATH DIFFICULTY BREATHING OR WHEEZING 18 g 0     albuterol (PROVENTIL) (2.5 MG/3ML) 0.083% neb solution Take 1 vial (2.5 mg) by nebulization every 6 hours as needed for shortness of breath / dyspnea or wheezing 1 Box 0     alcohol swab prep pads Use to swab area of injection/mike as directed. 100 each 3     allopurinol (ZYLOPRIM) 300 MG tablet TAKE ONE TABLET BY MOUTH ONCE DAILY 90 tablet 1     ARIPiprazole (ABILIFY) 10 MG tablet TAKE ONE TABLET BY MOUTH ONCE DAILY 90 tablet 1     buPROPion (WELLBUTRIN XL) 150 MG 24 hr tablet TAKE 1 TABLET BY MOUTH EVERY EVENING 150 tablet 0     furosemide (LASIX) 40 MG tablet Take 1 tablet (40 mg) by mouth 2 times daily 180 tablet 4     gabapentin (NEURONTIN) 300 MG capsule Take 1 capsule (300 mg) by mouth At Bedtime TAKE ONE CAPSULE BY MOUTH ONCE DAILY AT BEDTIME WITH A 600 MG TABLET FOR A TOTAL DOSE  MG 90 capsule 3     gabapentin (NEURONTIN) 600 MG tablet TAKE ONE TABLET BY MOUTH THREE TIMES A DAY 90 tablet 3     ipratropium - albuterol 0.5 mg/2.5 mg/3 mL (DUONEB) 0.5-2.5 (3) MG/3ML neb solution Take 1 vial (3 mLs) by nebulization every 6 hours as needed for shortness of breath / dyspnea or wheezing 30 vial 11     lamoTRIgine (LAMICTAL) 100 MG tablet TAKE ONE TABLET BY MOUTH ONCE DAILY 90 tablet 1     levothyroxine (SYNTHROID/LEVOTHROID) 175 MCG tablet TAKE ONE TABLET BY MOUTH ONCE DAILY 90 tablet 0     metFORMIN (GLUCOPHAGE) 1000 MG tablet Take 1 tablet (1,000 mg) by mouth 2 times daily (with meals) 180 tablet 1     mometasone-formoterol (DULERA) 200-5 MCG/ACT oral inhaler Inhale 2 puffs into the lungs 2 times daily 13 g 1     omeprazole (PRILOSEC) 20 MG DR capsule Take 1  capsule (20 mg) by mouth 2 times daily 60 capsule 11     potassium chloride ER (KLOR-CON M) 10 MEQ CR tablet TAKE ONE TABLET BY MOUTH ONCE DAILY 90 tablet 2     pramipexole (MIRAPEX) 0.125 MG tablet Take 1-20 tablets (0.125-2.5 mg) by mouth At Bedtime TAKE 1-2 TABLETS BY MOUTH AT BEDTIME 180 tablet 1     simvastatin (ZOCOR) 20 MG tablet TAKE ONE TABLET BY MOUTH EVERY NIGHT AT BEDTIME (Patient taking differently: Take 20 mg by mouth At Bedtime ) 90 tablet 3     traZODone (DESYREL) 150 MG tablet TAKE ONE TABLET BY MOUTH EVERY NIGHT AT BEDTIME 90 tablet 1     VITAMIN D3 1000 units tablet TAKE ONE TABLET BY MOUTH ONCE DAILY 100 tablet 2     warfarin ANTICOAGULANT (JANTOVEN ANTICOAGULANT) 5 MG tablet TAKE TWO AND ONE-HALF TABLETS BY MOUTH EVERY MONDAY, TWO TABLETS ON ALL OTHER DAYS OR AS DIRECTED BY ANTICOAGULATION CLINIC 175 tablet 0     blood glucose (NO BRAND SPECIFIED) test strip Use to test blood sugar 2-3x/day. To accompany: Blood Glucose Monitor Brands: per insurance. 100 strip 11     blood glucose calibration (NO BRAND SPECIFIED) solution To accompany: Blood Glucose Monitor Brands: per insurance. 1 Bottle 3     blood glucose monitoring (NO BRAND SPECIFIED) meter device kit Use to test blood sugar daily or as directed. Preferred blood glucose meter OR supplies to accompany: Blood Glucose Monitor Brands: per insurance. 1 kit 0     EPINEPHrine (ANY BX GENERIC EQUIV) 0.3 MG/0.3ML injection 2-pack Inject 0.3 mLs (0.3 mg) into the muscle once as needed for anaphylaxis (Patient not taking: Reported on 6/24/2020) 0.6 mL 0     order for DME Equipment being ordered:x2 [employer] 30/40mmHg compression wraps with x2 extra prs of compression liners 1 each 0     order for DME Equipment being ordered: Nebulizer 1 Device 0     order for DME Equipment being ordered: CPAP  AIRSENSE 10  5-18 CM H20  SN# [id number]   DN# [id number]       order for DME Equipment being ordered: DEPENDS SIZE LARGE 60 Month 5     order for DME Equipment being  ordered: INCONTINENCE PADS   QID 1 Month 11     thin (NO BRAND SPECIFIED) lancets Use with lanceting device. To accompany: Blood Glucose Monitor Brands: per insurance. 1 each 6     trimethoprim-polymyxin b (POLYTRIM) 08113-3.1 UNIT/ML-% ophthalmic solution Place 1-2 drops Into the left eye every 6 hours (Patient not taking: Reported on 6/24/2020) 10 mL 0        Physical examination:  Physical Exam as observed via telehealth:         Constitutional - General appearance, and body habitus are within normal range         Eyes -there is no redness, or discharge seen.         Respiratory -there is no cough, or labored breathing observed         Musculoskeletal -full range of motion is observed         Skin -there is no visible discoloration, or visible lesions         Neurological -there is tremors is observed         Psychiatric -the patient is alert & oriented.    The rest of a comprehensive physical examination is deferred due to PHE (public health emergency) video visit restrictions.    Laboratory/Imaging Studies:  Office Visit on 06/22/2020   Component Date Value Ref Range Status     Sodium 06/22/2020 137  133 - 144 mmol/L Final     Potassium 06/22/2020 3.9  3.4 - 5.3 mmol/L Final     Chloride 06/22/2020 101  94 - 109 mmol/L Final     Carbon Dioxide 06/22/2020 32  20 - 32 mmol/L Final     Anion Gap 06/22/2020 4  3 - 14 mmol/L Final     Glucose 06/22/2020 113* 70 - 99 mg/dL Final    Fasting specimen     Urea Nitrogen 06/22/2020 12  7 - 30 mg/dL Final     Creatinine 06/22/2020 0.71  0.52 - 1.04 mg/dL Final     GFR Estimate 06/22/2020 >90  >60 mL/min/[1.73_m2] Final    Comment: Non  GFR Calc  Starting 12/18/2018, serum creatinine based estimated GFR (eGFR) will be   calculated using the Chronic Kidney Disease Epidemiology Collaboration   (CKD-EPI) equation.       GFR Estimate If Black 06/22/2020 >90  >60 mL/min/[1.73_m2] Final    Comment:  GFR Calc  Starting 12/18/2018, serum creatinine  based estimated GFR (eGFR) will be   calculated using the Chronic Kidney Disease Epidemiology Collaboration   (CKD-EPI) equation.       Calcium 06/22/2020 8.8  8.5 - 10.1 mg/dL Final     Bilirubin Total 06/22/2020 0.4  0.2 - 1.3 mg/dL Final     Albumin 06/22/2020 3.1* 3.4 - 5.0 g/dL Final     Protein Total 06/22/2020 6.3* 6.8 - 8.8 g/dL Final     Alkaline Phosphatase 06/22/2020 127  40 - 150 U/L Final     ALT 06/22/2020 18  0 - 50 U/L Final     AST 06/22/2020 9  0 - 45 U/L Final     TSH 06/22/2020 3.34  0.40 - 4.00 mU/L Final     T4 Free 06/22/2020 1.40  0.76 - 1.46 ng/dL Final     Hemoglobin A1C 06/22/2020 6.2* 0 - 5.6 % Final    Comment: Normal <5.7% Prediabetes 5.7-6.4%  Diabetes 6.5% or higher - adopted from ADA   consensus guidelines.       Creatinine Urine 06/22/2020 62  mg/dL Final     Albumin Urine mg/L 06/22/2020 45  mg/L Final     Albumin Urine mg/g Cr 06/22/2020 72.21* 0 - 25 mg/g Cr Final     Cholesterol 06/22/2020 111  <200 mg/dL Final     Triglycerides 06/22/2020 213* <150 mg/dL Final    Comment: Borderline high:  150-199 mg/dl  High:             200-499 mg/dl  Very high:       >499 mg/dl  Fasting specimen       HDL Cholesterol 06/22/2020 32* >49 mg/dL Final     LDL Cholesterol Calculated 06/22/2020 36  <100 mg/dL Final    Desirable:       <100 mg/dl     Non HDL Cholesterol 06/22/2020 79  <130 mg/dL Final     WBC 06/22/2020 6.9  4.0 - 11.0 10e9/L Final     RBC Count 06/22/2020 5.85* 3.8 - 5.2 10e12/L Final     Hemoglobin 06/22/2020 14.3  11.7 - 15.7 g/dL Final     Hematocrit 06/22/2020 49.1* 35.0 - 47.0 % Final     MCV 06/22/2020 84  78 - 100 fl Final     MCH 06/22/2020 24.4* 26.5 - 33.0 pg Final     MCHC 06/22/2020 29.1* 31.5 - 36.5 g/dL Final     RDW 06/22/2020 21.8* 10.0 - 15.0 % Final     Platelet Count 06/22/2020 117* 150 - 450 10e9/L Final     % Neutrophils 06/22/2020 72.9  % Final     % Lymphocytes 06/22/2020 18.9  % Final     % Monocytes 06/22/2020 6.4  % Final     % Eosinophils 06/22/2020 1.2   % Final     % Basophils 06/22/2020 0.6  % Final     Absolute Neutrophil 06/22/2020 5.1  1.6 - 8.3 10e9/L Final     Absolute Lymphocytes 06/22/2020 1.3  0.8 - 5.3 10e9/L Final     Absolute Monocytes 06/22/2020 0.4  0.0 - 1.3 10e9/L Final     Absolute Eosinophils 06/22/2020 0.1  0.0 - 0.7 10e9/L Final     Absolute Basophils 06/22/2020 0.0  0.0 - 0.2 10e9/L Final     Diff Method 06/22/2020 Automated Method   Final   Orders Only on 06/19/2020   Component Date Value Ref Range Status     WBC 06/19/2020 6.0  4.0 - 11.0 10e9/L Final     RBC Count 06/19/2020 5.59* 3.8 - 5.2 10e12/L Final     Hemoglobin 06/19/2020 13.5  11.7 - 15.7 g/dL Final     Hematocrit 06/19/2020 47.4* 35.0 - 47.0 % Final     MCV 06/19/2020 85  78 - 100 fl Final     MCH 06/19/2020 24.2* 26.5 - 33.0 pg Final     MCHC 06/19/2020 28.5* 31.5 - 36.5 g/dL Final     RDW 06/19/2020 21.7* 10.0 - 15.0 % Final     Platelet Count 06/19/2020 110* 150 - 450 10e9/L Final     % Neutrophils 06/19/2020 68.4  % Final     % Lymphocytes 06/19/2020 23.2  % Final     % Monocytes 06/19/2020 6.8  % Final     % Eosinophils 06/19/2020 1.3  % Final     % Basophils 06/19/2020 0.3  % Final     Absolute Neutrophil 06/19/2020 4.1  1.6 - 8.3 10e9/L Final     Absolute Lymphocytes 06/19/2020 1.4  0.8 - 5.3 10e9/L Final     Absolute Monocytes 06/19/2020 0.4  0.0 - 1.3 10e9/L Final     Absolute Eosinophils 06/19/2020 0.1  0.0 - 0.7 10e9/L Final     Absolute Basophils 06/19/2020 0.0  0.0 - 0.2 10e9/L Final     Diff Method 06/19/2020 Automated Method   Final     INR 06/19/2020 2.05* 0.86 - 1.14 Final     TSH 06/19/2020 4.00  0.40 - 4.00 mU/L Final        No results found for this or any previous visit (from the past 744 hour(s)).    Assessment and plan:    (D75.1) Erythrocytosis  (primary encounter diagnosis)  I reviewed with the patient today most recent laboratory test.  Hematocrit today is 49.  We recommend to continue with phlebotomy to keep hematocrit around 45.  I will arrange for CBC  monthly and offer phlebotomy monthly if hematocrit is more than 45%.  I will see the patient again in 6 months or sooner if there are new developments or concerns.    (J44.1) COPD exacerbation (H)  Patient currently on Advair and albuterol inhalers.  Patient also on  nebulizer treatment.    (E11.9) Type 2 diabetes mellitus without complication, without long-term current use of insulin (H)  Diabetes is currently well controlled.  She is currently on metformin 1000 mg twice daily.    (K21.9) Gastroesophageal reflux disease without esophagitis  Patient currently on omeprazole 20 mg orally daily.    (E03.9) Hypothyroidism, unspecified type  Patient currently on Synthroid 75 mcg orally daily.    The patient is ready to learn, no apparent learning barriers were identified.  Diagnosis and treatment plans were explained to the patient. The patient expressed understanding of the content. The patient asked appropriate questions. The patient questions were answered to her satisfaction.    This is started 11:45 AM  Visit ended at 12:10 PM    Joshua Watts MD    Chart documentation with Dragon Voice recognition Software. Although reviewed after completion, some words and grammatical errors may remain.

## 2020-06-24 NOTE — ASSESSMENT & PLAN NOTE
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 She was previously seen by Dr. Lomax on 10/09/2011, but has been lost from followup. The patient has had erythrocytosis with a hemoglobin around 20-23 and hemoglobin and hematocrit of 60-65% for most of her life. She had a previous workup by Dr. Lomax and also by another hematologist at [facility]. Review of her previous tests and workup showed a hemoglobin electrophoresis on 01/17/2007 that showed a hemoglobin A1 of 52%, A2 at 0, hemoglobin F at 1.4% and other hemoglobin 46.2%. JAK2 was negative on 07/31/2007. Hemoglobin affinity testing on 07/31/2007 showed a P50 of 27 (normal between 25 and 29). Repeat hemoglobin electrophoresis on 10/04/2011 showed the hemoglobin was at 39.9%. This illuminated hemoglobin was suggestive of beta globin variant of hemoglobin Hb [place]. She was also seen by Dr. Peres in 2007 and most of the workup is from there. Erythropoietin level was in the range of the 80s. Vitamin B12 and folic acid were within normal limits. Abdomen ultrasound did not show hepatosplenomegaly, but there is fatty infiltration of the liver. According to the chart, the patient had phlebotomies done in the past to keep her hemoglobin below 20 g/dl. This was done over the summertime of 2011. Most recently she didn't have any phlebotomy. She was started on anticoagulation with Coumadin as she was felt to be at high risk for blood clots and given history of severe arterial disease and stroke in the past. The patient is safe to use her anticoagulation and her most recent INR was 1.74. Evidently her carbon monoxide is also elevated. She is an active smoker, but she is cutting down from 2 packs per day to  3 cigarettes a day over the last one month. Patient denies any DVT or PE. She also evidently claims that her mother and her first son are also having the same hemoglobinopathy and are being followed by their physicians. Bone marrow biopsy on 11/17/2014 showed no clonal chromosomal abnormality and no cytogenetic evidence of a malignant process. She also tested negative for the JAK2 mutation. Leukemia lymphoma evaluation additionally showed no aberrant immunophenotype on T cells and no increase in or abnormal appearing myeloid blasts.

## 2020-07-03 ENCOUNTER — VIRTUAL VISIT (OUTPATIENT)
Dept: PSYCHOLOGY | Facility: CLINIC | Age: 55
End: 2020-07-03
Payer: COMMERCIAL

## 2020-07-03 DIAGNOSIS — F31.62 MODERATE MIXED BIPOLAR I DISORDER (H): Primary | ICD-10-CM

## 2020-07-03 PROCEDURE — 90832 PSYTX W PT 30 MINUTES: CPT | Mod: 95 | Performed by: PSYCHOLOGIST

## 2020-07-03 NOTE — PROGRESS NOTES
"                                           Progress Note  Disclaimer: Voice recognition software was used to generate this note. As a result, wrong word or 'sound-a-like' substitutions may have occurred due to the inherent limitations of voice recognition software. There may be errors in the script that have gone undetected. Please consider this when interpreting information found in this chart.     Patient Name: Bia Bill  Date: 7/3/2020         Service Type: Individual      Session Start Time: 9:30  Session End Time: 10:00     Session Length: 30    Session #: 61    Attendees: Client attended alone    Service Modality:  Phone Visit:    The patient has been notified of the following:      \"We have found that certain health care needs can be provided without the need for a face to face visit.  This service lets us provide the care you need with a phone conversation.       I will have full access to your [place] medical record during this entire phone call.   I will be taking notes for your medical record.      Since this is like an office visit, we will bill your insurance company for this service.       There are potential benefits and risks of telephone visits (e.g. limits to patient confidentiality) that differ from in-person visits.?  Confidentiality still applies for telephone services, and nobody will record the visit.  It is important to be in a quiet, private space that is free of distractions (including cell phone or other devices) during the visit.??      If during the course of the call I believe a telephone visit is not appropriate, you will not be charged for this service\"     Consent has been obtained for this service by care team member: Yes      Treatment Plan Last Reviewed: 4/8/2020  PHQ-9 / CHIN-7 : see flowsheets    DATA  Interactive Complexity: No  Crisis: No       Progress Since Last Session (Related to Symptoms / Goals / Homework):   Symptoms: No change stable    Homework: Achieved / " completed to satisfaction      Episode of Care Goals: Satisfactory progress - MAINTENANCE (Working to maintain change, with risk of relapse); Intervened by continuing to positively reinforce healthy behavior choice      Current / Ongoing Stressors and Concerns:   Longtime history of bipolar disorder. Family stress.     Treatment Objective(s) Addressed in This Session:   identify 2 strategies for more effectively managing Bipolar Disorder       Intervention:   CBT: behavioral activation        ASSESSMENT: Current Emotional / Mental Status (status of significant symptoms):   Risk status (Self / Other harm or suicidal ideation)   Patient denies current fears or concerns for personal safety.   Patient denies current or recent suicidal ideation or behaviors.   Patient denies current or recent homicidal ideation or behaviors.   Patient denies current or recent self injurious behavior or ideation.   Patient denies other safety concerns.   Patient reports there has been no change in risk factors since their last session.     Patient reports there has been no change in protective factors since their last session.     Recommended that patient call 911 or go to the local ED should there be a change in any of these risk factors.     Appearance:   Unable to assess    Eye Contact:   Unable to assess    Psychomotor Behavior: Unable to assess    Attitude:   Cooperative    Orientation:   All   Speech    Rate / Production: Normal     Volume:  Normal    Mood:    Normal   Affect:    Appropriate    Thought Content:  Clear    Thought Form:  Coherent  Logical    Insight:    Good      Medication Review:   No changes to current psychiatric medication(s)     Medication Compliance:   Yes     Changes in Health Issues:   None reported     Chemical Use Review:   Substance Use: Chemical use reviewed, no active concerns identified      Tobacco Use: No change in amount of tobacco use since last session.  Provided encouragement to quit      Diagnosis:  1. Moderate mixed bipolar I disorder (H)        Collateral Reports Completed:   Not Applicable    PLAN: (Patient Tasks / Therapist Tasks / Other)  Client to maintain boundaries with family. Try to quit or reduce smoking, do one activity per day from her self care list.        Kd Durhamon                                                         ______________________________________________________________________       Treatment Plan     Client's Name: Bia Bill               YOB: 1965     Date: 4/28/2020        DSM5 Diagnoses: (Sustained by DSM5 Criteria Listed Above)  Diagnoses: 296.40 Bipolar I Disorder Current or Most Recent Episode Manic, Unspecified  Psychosocial & Contextual Factors: financial difficulties, chronic pain, roommate issues  WHODAS 2.0 (12 item)               This questionnaire asks about difficulties due to health conditions. Health conditions             include disease or illnesses, other health problems that may be short or long lasting,             injuries, mental health or emotional problems, and problems with alcohol or drugs.                         Think back over the past 30 days and answer these questions, thinking about how much             difficulty you had doing the following activities. For each question, please [place] only             one response.      S1  Standing for long periods such as 30 minutes?  Mild =           2    S2  Taking care of household responsibilities?  Moderate =   3    S3  Learning a new task, for example, learning how to get to a new place?  Mild =           2    S4  How much of a problem do you have joining community activities (for example, festivals, Scientologist or other activities) in the same way as anyone else can?  Moderate =   3    S5  How much have you been emotionally affected by your health problems?  Mild =           2             In the past 30 days, how much difficulty did you have in:    S6   Concentrating on doing something for ten minutes?  Mild =           2    S7  Walking a long distance such as a kilometer (or equivalent)?  Severe =       4    S8  Washing your whole body?  None =         1    S9  Getting dressed?  None =         1    S10  Dealing with people you do not know?  Moderate =   3    S11  Maintaining a friendship?  Severe =       4    S12  Your day to day work?  Moderate =   3       H1  Overall, in the past 30 days, how many days were these difficulties present?  Record number of days seven    H2  In the past 30 days, for how many days were you totally unable to carry out your usual activities or work because of any health condition?  Record number of days  seven    H3  In the past 30 days, not counting the days that you were totally unable, for how many days did you cut back or reduce your usual activities or work because of any health condition?  Record number of days five                      Referral / Collaboration:  Referral to another professional/service is not indicated at this time..     Anticipated number of session or this episode of care: 60     Goals  1. Education- the Biopsychosocial model of depression  a. Client will be able to describe how depression is effecting them physically, emotionally and socially  2. Behavioral Activation  a. Client will learn to assess their depression on a day to day basis  b. Client will Identify two forms of exercise/activity and engage in them 3 times per week  c. Client will Identify 3 things that make them laugh, and engage in these 5 times per week.  d. Client will Identify 1-2Creative activities or hobbies  and engage in them 2 times per week  e. Client will identify music, movies, books that make them feel good and use them 3-4 times per week  3. Self-care  a. Client will identify 5 things they can do just for themselves  b. Client will take time for quiet, reflection, meditation 5 times per week  c. Client will Learn to set boundaries  when appropriate  d. Client will Identify 2 individuals they can call on for support, distraction  4. Assessment of progress  a. Client will engage in assessment of their progress on a regular basis     Bipolar Disorder  Treatment plan:  1. Education- the Biopsychosocial model of Bipolar Disorder    a. Client will be able to describe in general terms what Bipolar Disorder  is and is not  b. Client will be able to describe how Bipolar Disorder  has affected their life in at least two different areas, such as school or work and Home/relationships  c. Clients parents/guardians or significant others will be provided information on what Bipolar Disorder  is and the ways it can affect relationships and be encouraged to be a part of clients treatment team.  2. Medication  a. Client will participate in medication evaluation for Bipolar Disorder  symptoms and follow medication recommendations.   3. Identification and management of triggers  a. Client and therapist will examine patient s history to determine if there are predictable triggers for manic or depressive episodes (e.g. boredom, anger, family stress)  b. Client and therapist will develop means of diffusing these triggers (e.g. relaxation strategies, boundary setting, anger management)  4. Comorbid conditions   a. Client and therapist will asses for comorbid conditions (e.g. anxiety, depression, substance use). and add additional items to treatment plan as needed  5. Self-care  a. Client will identify 3 things they can do just for themselves  b. Client will take time for quiet, reflection, meditation 3 times per week  c. Client will Learn to set boundaries when appropriate  d. Client will Identify 2 individuals they can call on for support, distraction  5. Behavioral Activation  a. Client will Identify two forms of exercise/activity and engage in them 3 times per week  b. Client will Identify 2 things that make them laugh, and engage in these 3 times per  week.  c. Client will Identify 2 Creative activities or hobbies  and engage in them 2 times per week  d. Client will identify music, movies, books that make them feel good and use them 3 times per week  6. Assessment of progress  a. Client will engage in assessment of their progress on a regular basis     Client has reviewed and agreed to the above plan.        Kd Morris             4/28/2020

## 2020-07-07 ENCOUNTER — PATIENT OUTREACH (OUTPATIENT)
Dept: CARE COORDINATION | Facility: CLINIC | Age: 55
End: 2020-07-07

## 2020-07-07 NOTE — PROGRESS NOTES
Clinic Care Coordination Contact  Clinic Care Coordination Contact    Follow Up Progress Note      Assessment: Outreach to patient. Left message on voicemail and requested return call.     Per chart review patient had face to face PCP visit on 6/22/20. Labs drawn. Advised to eat more protein, less sweets. Patient has lost 20+ pounds since the first of 2020.    Patient is seeing psychology regularly.     Goals addressed this encounter:   Goals Addressed    None     Intervention/Education provided during outreach: Return call to care coordinator.      Outreach Frequency: monthly    Plan:   Patient will return call    Care Coordinator will follow up in one month if no response.     Michela Whitaker RN, [facility] RN Coordinator  [facility]   7/7/2020    1:44 PM  [phone]

## 2020-07-10 DIAGNOSIS — I89.0 LYMPHEDEMA OF BOTH LOWER EXTREMITIES: ICD-10-CM

## 2020-07-10 RX ORDER — CHOLECALCIFEROL (VITAMIN D3) 25 MCG
TABLET ORAL
Qty: 100 TABLET | Refills: 2 | Status: SHIPPED | OUTPATIENT
Start: 2020-07-10 | End: 2021-07-28

## 2020-07-20 ENCOUNTER — TELEPHONE (OUTPATIENT)
Dept: ONCOLOGY | Facility: CLINIC | Age: 55
End: 2020-07-20

## 2020-07-20 NOTE — TELEPHONE ENCOUNTER
----- Message from Henrietta Monreal sent at 7/20/2020  9:15 AM CDT -----  Regarding: RE: Follow up labs and visit needed  LVM for pt.   ----- Message -----  From: Kenya Dupont RN  Sent: 7/20/2020   8:32 AM CDT  To: [facility]   Subject: Follow up labs and visit needed                  Hello,  This patient was a no show for her labs last Friday 7/17. Per Dr. Wang check out report on 6/24, she is supposed to get monthly CBC lab draws with possible phlebotomy if hematocrit is more than 45% and then should follow up in 6 months. Will you please call this patient and assist her in scheduling these. Thanks!  Goldie

## 2020-07-22 ENCOUNTER — TELEPHONE (OUTPATIENT)
Dept: FAMILY MEDICINE | Facility: CLINIC | Age: 55
End: 2020-07-22

## 2020-07-24 ENCOUNTER — VIRTUAL VISIT (OUTPATIENT)
Dept: PSYCHOLOGY | Facility: CLINIC | Age: 55
End: 2020-07-24
Payer: COMMERCIAL

## 2020-07-24 ENCOUNTER — HOSPITAL ENCOUNTER (OUTPATIENT)
Dept: LAB | Facility: CLINIC | Age: 55
Discharge: HOME | End: 2020-07-24
Attending: INTERNAL MEDICINE | Admitting: INTERNAL MEDICINE
Payer: COMMERCIAL

## 2020-07-24 ENCOUNTER — ANTICOAGULATION THERAPY VISIT (OUTPATIENT)
Dept: FAMILY MEDICINE | Facility: CLINIC | Age: 55
End: 2020-07-24

## 2020-07-24 DIAGNOSIS — I82.409 DVT (DEEP VENOUS THROMBOSIS) (H): ICD-10-CM

## 2020-07-24 DIAGNOSIS — I73.9 PVD (PERIPHERAL VASCULAR DISEASE) WITH CLAUDICATION (H): ICD-10-CM

## 2020-07-24 DIAGNOSIS — F31.62 MODERATE MIXED BIPOLAR I DISORDER (H): Primary | ICD-10-CM

## 2020-07-24 DIAGNOSIS — Z86.73 HISTORY OF STROKE: ICD-10-CM

## 2020-07-24 DIAGNOSIS — D75.1 ERYTHROCYTOSIS: ICD-10-CM

## 2020-07-24 DIAGNOSIS — Z79.01 LONG TERM (CURRENT) USE OF ANTICOAGULANTS: ICD-10-CM

## 2020-07-24 DIAGNOSIS — I82.409 DEEP PHLEBOTHROMBOSIS, ANTEPARTUM, WITH DELIVERY (H): ICD-10-CM

## 2020-07-24 DIAGNOSIS — O22.30 DEEP PHLEBOTHROMBOSIS, ANTEPARTUM, WITH DELIVERY (H): ICD-10-CM

## 2020-07-24 LAB
ANISOCYTOSIS BLD QL SMEAR: ABNORMAL
BASOPHILS # BLD AUTO: 0 10E9/L (ref 0–0.2)
BASOPHILS NFR BLD AUTO: 0.4 %
DIFFERENTIAL METHOD BLD: ABNORMAL
EOSINOPHIL # BLD AUTO: 0.1 10E9/L (ref 0–0.7)
EOSINOPHIL NFR BLD AUTO: 1.2 %
ERYTHROCYTE [DISTWIDTH] IN BLOOD BY AUTOMATED COUNT: 21 % (ref 10–15)
HCT VFR BLD AUTO: 48.6 % (ref 35–47)
HGB BLD-MCNC: 13.8 G/DL (ref 11.7–15.7)
IMM GRANULOCYTES # BLD: 0 10E9/L (ref 0–0.4)
IMM GRANULOCYTES NFR BLD: 0.2 %
INR PPP: 2.27 (ref 0.86–1.14)
LYMPHOCYTES # BLD AUTO: 1.2 10E9/L (ref 0.8–5.3)
LYMPHOCYTES NFR BLD AUTO: 23.7 %
MCH RBC QN AUTO: 23.9 PG (ref 26.5–33)
MCHC RBC AUTO-ENTMCNC: 28.4 G/DL (ref 31.5–36.5)
MCV RBC AUTO: 84 FL (ref 78–100)
MONOCYTES # BLD AUTO: 0.3 10E9/L (ref 0–1.3)
MONOCYTES NFR BLD AUTO: 6.1 %
NEUTROPHILS # BLD AUTO: 3.4 10E9/L (ref 1.6–8.3)
NEUTROPHILS NFR BLD AUTO: 68.4 %
NRBC # BLD AUTO: 0 10*3/UL
NRBC BLD AUTO-RTO: 0 /100
PLATELET # BLD AUTO: 118 10E9/L (ref 150–450)
PLATELET # BLD EST: ABNORMAL 10*3/UL
RBC # BLD AUTO: 5.78 10E12/L (ref 3.8–5.2)
VARIANT LYMPHS BLD QL SMEAR: PRESENT
WBC # BLD AUTO: 4.9 10E9/L (ref 4–11)

## 2020-07-24 PROCEDURE — 36415 COLL VENOUS BLD VENIPUNCTURE: CPT | Performed by: INTERNAL MEDICINE

## 2020-07-24 PROCEDURE — 85610 PROTHROMBIN TIME: CPT | Performed by: INTERNAL MEDICINE

## 2020-07-24 PROCEDURE — 85025 COMPLETE CBC W/AUTO DIFF WBC: CPT | Performed by: INTERNAL MEDICINE

## 2020-07-24 PROCEDURE — 90832 PSYTX W PT 30 MINUTES: CPT | Mod: 95 | Performed by: PSYCHOLOGIST

## 2020-07-24 NOTE — PROGRESS NOTES
"                                           Progress Note  Disclaimer: Voice recognition software was used to generate this note. As a result, wrong word or 'sound-a-like' substitutions may have occurred due to the inherent limitations of voice recognition software. There may be errors in the script that have gone undetected. Please consider this when interpreting information found in this chart.     Patient Name: Bia Bill  Date: 7/24/2020         Service Type: Individual      Session Start Time: 8:30 AM  session End Time: 9:00 AM  Session Length: 30    Session #: 62    Attendees: Client attended alone    Service Modality:  Phone Visit:    The patient has been notified of the following:      \"We have found that certain health care needs can be provided without the need for a face to face visit.  This service lets us provide the care you need with a phone conversation.       I will have full access to your [place] medical record during this entire phone call.   I will be taking notes for your medical record.      Since this is like an office visit, we will bill your insurance company for this service.       There are potential benefits and risks of telephone visits (e.g. limits to patient confidentiality) that differ from in-person visits.?  Confidentiality still applies for telephone services, and nobody will record the visit.  It is important to be in a quiet, private space that is free of distractions (including cell phone or other devices) during the visit.??      If during the course of the call I believe a telephone visit is not appropriate, you will not be charged for this service\"     Consent has been obtained for this service by care team member: Yes      Treatment Plan Last Reviewed: 7/24/2020  PHQ-9 / CHIN-7 : see flowsheets    DATA  Interactive Complexity: No  Crisis: No       Progress Since Last Session (Related to Symptoms / Goals / Homework):   Symptoms: No change stable    Homework: Achieved / " completed to satisfaction      Episode of Care Goals: Satisfactory progress - MAINTENANCE (Working to maintain change, with risk of relapse); Intervened by continuing to positively reinforce healthy behavior choice      Current / Ongoing Stressors and Concerns:   Longtime history of bipolar disorder. Family stress.  Went camping with her sister. Good to get out.     Treatment Objective(s) Addressed in This Session:   identify 2 strategies for more effectively managing Bipolar Disorder       Intervention:   CBT: behavioral activation        ASSESSMENT: Current Emotional / Mental Status (status of significant symptoms):   Risk status (Self / Other harm or suicidal ideation)   Patient denies current fears or concerns for personal safety.   Patient denies current or recent suicidal ideation or behaviors.   Patient denies current or recent homicidal ideation or behaviors.   Patient denies current or recent self injurious behavior or ideation.   Patient denies other safety concerns.   Patient reports there has been no change in risk factors since their last session.     Patient reports there has been no change in protective factors since their last session.     Recommended that patient call 911 or go to the local ED should there be a change in any of these risk factors.     Appearance:   Unable to assess    Eye Contact:   Unable to assess    Psychomotor Behavior: Unable to assess    Attitude:   Cooperative    Orientation:   All   Speech    Rate / Production: Normal     Volume:  Normal    Mood:    Normal   Affect:    Appropriate    Thought Content:  Clear    Thought Form:  Coherent  Logical    Insight:    Good      Medication Review:   No changes to current psychiatric medication(s)     Medication Compliance:   Yes     Changes in Health Issues:   None reported     Chemical Use Review:   Substance Use: Chemical use reviewed, no active concerns identified      Tobacco Use: No change in amount of tobacco use since last  session.  Provided encouragement to quit     Diagnosis:  1. Moderate mixed bipolar I disorder (H)        Collateral Reports Completed:   Not Applicable    PLAN: (Patient Tasks / Therapist Tasks / Other)  Client to maintain boundaries with family. Try to quit or reduce smoking, do one activity per day from her self care list.        Kd Durhamon                                                         ______________________________________________________________________       Treatment Plan     Client's Name: Bia Bill               YOB: 1965     Date: 4/28/2020        DSM5 Diagnoses: (Sustained by DSM5 Criteria Listed Above)  Diagnoses: 296.40 Bipolar I Disorder Current or Most Recent Episode Manic, Unspecified  Psychosocial & Contextual Factors: financial difficulties, chronic pain, roommate issues  WHODAS 2.0 (12 item)               This questionnaire asks about difficulties due to health conditions. Health conditions             include disease or illnesses, other health problems that may be short or long lasting,             injuries, mental health or emotional problems, and problems with alcohol or drugs.                         Think back over the past 30 days and answer these questions, thinking about how much             difficulty you had doing the following activities. For each question, please [place] only             one response.      S1  Standing for long periods such as 30 minutes?  Mild =           2    S2  Taking care of household responsibilities?  Moderate =   3    S3  Learning a new task, for example, learning how to get to a new place?  Mild =           2    S4  How much of a problem do you have joining community activities (for example, festivals, Jewish or other activities) in the same way as anyone else can?  Moderate =   3    S5  How much have you been emotionally affected by your health problems?  Mild =           2             In the past 30 days, how much  difficulty did you have in:    S6  Concentrating on doing something for ten minutes?  Mild =           2    S7  Walking a long distance such as a kilometer (or equivalent)?  Severe =       4    S8  Washing your whole body?  None =         1    S9  Getting dressed?  None =         1    S10  Dealing with people you do not know?  Moderate =   3    S11  Maintaining a friendship?  Severe =       4    S12  Your day to day work?  Moderate =   3       H1  Overall, in the past 30 days, how many days were these difficulties present?  Record number of days seven    H2  In the past 30 days, for how many days were you totally unable to carry out your usual activities or work because of any health condition?  Record number of days  seven    H3  In the past 30 days, not counting the days that you were totally unable, for how many days did you cut back or reduce your usual activities or work because of any health condition?  Record number of days five                      Referral / Collaboration:  Referral to another professional/service is not indicated at this time..     Anticipated number of session or this episode of care: 60     Goals  1. Education- the Biopsychosocial model of depression  a. Client will be able to describe how depression is effecting them physically, emotionally and socially  2. Behavioral Activation  a. Client will learn to assess their depression on a day to day basis  b. Client will Identify two forms of exercise/activity and engage in them 3 times per week  c. Client will Identify 3 things that make them laugh, and engage in these 5 times per week.  d. Client will Identify 1-2Creative activities or hobbies  and engage in them 2 times per week  e. Client will identify music, movies, books that make them feel good and use them 3-4 times per week  3. Self-care  a. Client will identify 5 things they can do just for themselves  b. Client will take time for quiet, reflection, meditation 5 times per  week  c. Client will Learn to set boundaries when appropriate  d. Client will Identify 2 individuals they can call on for support, distraction  4. Assessment of progress  a. Client will engage in assessment of their progress on a regular basis     Bipolar Disorder  Treatment plan:  1. Education- the Biopsychosocial model of Bipolar Disorder    a. Client will be able to describe in general terms what Bipolar Disorder  is and is not  b. Client will be able to describe how Bipolar Disorder  has affected their life in at least two different areas, such as school or work and Home/relationships  c. Clients parents/guardians or significant others will be provided information on what Bipolar Disorder  is and the ways it can affect relationships and be encouraged to be a part of clients treatment team.  2. Medication  a. Client will participate in medication evaluation for Bipolar Disorder  symptoms and follow medication recommendations.   3. Identification and management of triggers  a. Client and therapist will examine patient s history to determine if there are predictable triggers for manic or depressive episodes (e.g. boredom, anger, family stress)  b. Client and therapist will develop means of diffusing these triggers (e.g. relaxation strategies, boundary setting, anger management)  4. Comorbid conditions   a. Client and therapist will asses for comorbid conditions (e.g. anxiety, depression, substance use). and add additional items to treatment plan as needed  5. Self-care  a. Client will identify 3 things they can do just for themselves  b. Client will take time for quiet, reflection, meditation 3 times per week  c. Client will Learn to set boundaries when appropriate  d. Client will Identify 2 individuals they can call on for support, distraction  5. Behavioral Activation  a. Client will Identify two forms of exercise/activity and engage in them 3 times per week  b. Client will Identify 2 things that make them laugh,  and engage in these 3 times per week.  c. Client will Identify 2 Creative activities or hobbies  and engage in them 2 times per week  d. Client will identify music, movies, books that make them feel good and use them 3 times per week  6. Assessment of progress  a. Client will engage in assessment of their progress on a regular basis     Client has reviewed and agreed to the above plan.        Kd Morris             7/24/2020

## 2020-07-24 NOTE — PROGRESS NOTES
Anticoagulation Management    Unable to reach [facility] today.    Today's INR result of 2.27 is therapeutic (goal INR of 2.0-3.0).  Result received from: Clinic Lab    Follow up required to confirm warfarin dose taken and assess for changes    LMTCB    Tentative plan: continue dosing and recheck in 6 weeks    Anticoagulation clinic to follow up    Tara Rivas RN

## 2020-07-24 NOTE — PROGRESS NOTES
ANTICOAGULATION MANAGEMENT     Patient Name:  Bia Bill  Date:  2020    ASSESSMENT /SUBJECTIVE:    Today's INR result of 2.27 is therapeutic. Goal INR of 2.0-3.0      Warfarin dose taken: Warfarin taken as previously instructed    Diet: Increased greens/vitamin K intake may be affecting INR    Medication changes/ interactions: No new medications/supplements affecting INR    Previous INR: Therapeutic     S/S of bleeding or thromboembolism: No    New injury or illness: No    Upcoming surgery, procedure or cardioversion: No    Additional findings: None      PLAN:    Spoke with Bia regarding INR result and instructed:     Warfarin Dosing Instructions: Continue your current warfarin dose 12.5 mg on  and 10 mg all other days    Instructed patient to follow up no later than: 6 weeks  Lab visit scheduled    Education provided: None required      Norah verbalizes understanding and agrees to warfarin dosing plan.    Instructed to call the Anticoagulation Clinic for any changes, questions or concerns. (#[phone])        Tara Rivas RN      OBJECTIVE:  Recent labs: (last 7 days)     20  1053   INR 2.27*         No question data found.  Anticoagulation Summary  As of 2020    INR goal:   2.0-3.0   TTR:   66.5 % (11.7 mo)   INR used for dosin.27 (2020)   Warfarin maintenance plan:   12.5 mg (5 mg x 2.5) every Mon; 10 mg (5 mg x 2) all other days   Full warfarin instructions:   12.5 mg every Mon; 10 mg all other days   Weekly warfarin total:   72.5 mg   Plan last modified:   Carmen Way RN (2020)   Next INR check:   2020   Priority:   Maintenance   Target end date:   Indefinite    Indications    PVD (peripheral vascular disease) with claudication (H) [I73.9]  Long term current use of anticoagulant therapy (Resolved) [Z79.01]  DVT (deep venous thrombosis) (H) [I82.409]  History of stroke symptoms  stroke ruled out on imaging [Z86.73]             Anticoagulation  Episode Summary     INR check location:       Preferred lab:       Send INR reminders to:   ANGI BHAKTA    Comments:   * lab draw due to elevated hematocrit (fingerstick meters don't work). Needs to have adjusted INR if hct over 55%. LEFT LE. STENT x 3 LEFT UPPER LEG. Previous arterial clot. AS of 10/9/19 Goal range 2.0-3.0.      Anticoagulation Care Providers     Provider Role Specialty Phone number    Kd Leong MD [facility] [phone]

## 2020-07-27 ENCOUNTER — VIRTUAL VISIT (OUTPATIENT)
Dept: FAMILY MEDICINE | Facility: CLINIC | Age: 55
End: 2020-07-27
Payer: COMMERCIAL

## 2020-07-27 DIAGNOSIS — D69.6 THROMBOCYTOPENIA (H): ICD-10-CM

## 2020-07-27 DIAGNOSIS — G63 POLYNEUROPATHY IN OTHER DISEASES CLASSIFIED ELSEWHERE (H): Primary | Chronic | ICD-10-CM

## 2020-07-27 DIAGNOSIS — E11.9 TYPE 2 DIABETES MELLITUS WITHOUT COMPLICATION, WITHOUT LONG-TERM CURRENT USE OF INSULIN (H): ICD-10-CM

## 2020-07-27 DIAGNOSIS — J44.1 CHRONIC OBSTRUCTIVE PULMONARY DISEASE WITH ACUTE EXACERBATION (H): Chronic | ICD-10-CM

## 2020-07-27 DIAGNOSIS — E66.2 MORBID OBESITY WITH ALVEOLAR HYPOVENTILATION (H): Chronic | ICD-10-CM

## 2020-07-27 DIAGNOSIS — I73.9 PVD (PERIPHERAL VASCULAR DISEASE) WITH CLAUDICATION (H): Chronic | ICD-10-CM

## 2020-07-27 DIAGNOSIS — F31.62 MODERATE MIXED BIPOLAR I DISORDER (H): Chronic | ICD-10-CM

## 2020-07-27 DIAGNOSIS — I10 HTN, GOAL BELOW 140/90: ICD-10-CM

## 2020-07-27 DIAGNOSIS — E66.01 MORBID OBESITY (H): Chronic | ICD-10-CM

## 2020-07-27 PROCEDURE — 99214 OFFICE O/P EST MOD 30 MIN: CPT | Mod: 95 | Performed by: FAMILY MEDICINE

## 2020-07-27 ASSESSMENT — PAIN SCALES - GENERAL: PAINLEVEL: MILD PAIN (2)

## 2020-07-27 NOTE — PROGRESS NOTES
"Bia Bill is a 54 year old female who is being evaluated via a billable telephone visit.      The patient has been notified of following:     \"This telephone visit will be conducted via a call between you and your physician/provider. We have found that certain health care needs can be provided without the need for a physical exam.  This service lets us provide the care you need with a short phone conversation.  If a prescription is necessary we can send it directly to your pharmacy.  If lab work is needed we can place an order for that and you can then stop by our lab to have the test done at a later time.    Telephone visits are billed at different rates depending on your insurance coverage. During this emergency period, for some insurers they may be billed the same as an in-person visit.  Please reach out to your insurance provider with any questions.    If during the course of the call the physician/provider feels a telephone visit is not appropriate, you will not be charged for this service.\"    Patient has given verbal consent for Telephone visit?  Yes    What phone number would you like to be contacted at? [phone]    How would you like to obtain your AVS? Mail a copy    Subjective     Bia Bill is a 54 year old female who presents via phone visit today for the following health issues:    HPI    Diabetes Follow-up    How often are you checking your blood sugar? Two times daily  Blood sugar testing frequency justification:  Risk of hypoglycemia with medication(s)  What time of day are you checking your blood sugars (select all that apply)?  Before meals  Have you had any blood sugars above 200?  No, they are just more elevated  Have you had any blood sugars below 70?  No    What symptoms do you notice when your blood sugar is low?  Shaky and Weak    What concerns do you have today about your diabetes? Other: She says they are all over the place. She says they are on the low end and high end " of the spectrum and the result of this is that she is wanting to sleep a lot.      Do you have any of these symptoms? (Select all that apply)  Numbness in feet and Blurry vision    Have you had a diabetic eye exam in the last 12 months? Yes- Date of last eye exam: 11/15/2019,  Location: [facility]        BP Readings from Last 2 Encounters:   06/22/20 134/58   01/30/20 122/64     Hemoglobin A1C (%)   Date Value   06/22/2020 6.2 (H)   01/23/2020 7.2 (H)     LDL Cholesterol Calculated (mg/dL)   Date Value   06/22/2020 36   09/27/2019 48         Hypertension Follow-up      Do you check your blood pressure regularly outside of the clinic? No     Are you following a low salt diet? Yes    Are your blood pressures ever more than 140 on the top number (systolic) OR more   than 90 on the bottom number (diastolic), for example 140/90? She is not sure as she does not check it.      How many servings of fruits and vegetables do you eat daily?  2-3    On average, how many sweetened beverages do you drink each day (Examples: soda, juice, sweet tea, etc.  Do NOT count diet or artificially sweetened beverages)?   0, she says sweet tea twice a week    How many days per week do you exercise enough to make your heart beat faster? 7    How many minutes a day do you exercise enough to make your heart beat faster? 20 - 29    How many days per week do you miss taking your medication? 0           Patient Active Problem List   Diagnosis     Mild persistent asthma     Polyneuropathy in other diseases classified elsewhere (H)     DVT (deep venous thrombosis) (H)     Alcohol abuse, in remission     SECONDARY POLYCYTHEMIA     Chondromalacia of patella     Developmental reading disorder     Esophageal reflux     Hypothyroidism     Fatty liver     GILES (Obstructive Sleep Apnea)-Moderate (AHI 16)     RLS (restless legs syndrome)     Smoker     Erythrocytosis     Moderate mixed bipolar I disorder (H)     Personality disorder, depressive     COPD  (chronic obstructive pulmonary disease) (H)     Rosacea     Health Care Home     DDD (degenerative disc disease), cervical     Benign neoplasm of colon (POLYPOSIS)     History of stroke symptoms, stroke ruled out on imaging     Somatization disorder     Sebaceous cyst of right axilla     HTN, goal below 140/90     Left leg pain     Vitamin D deficiency     Morbid obesity (H)     PVD (peripheral vascular disease) with claudication (H)     Cyst of left ovary     Morbid obesity with alveolar hypoventilation (H)     Severe episode of recurrent major depressive disorder (H)     Conductive hearing loss of left ear with restricted hearing of right ear     Sensorineural hearing loss (SNHL) of right ear with restricted hearing of left ear     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     COPD exacerbation (H)     Lymphedema of both lower extremities     Type 2 diabetes mellitus (H)     Bilateral edema of lower extremity     Dyspnea     Thrombocytopenia (H)     Past Surgical History:   Procedure Laterality Date     BONE MARROW BIOPSY, BONE SPECIMEN, NEEDLE/TROCAR N/A 11/17/2014    Procedure: BIOPSY BONE MARROW;  Surgeon: Hay Aaron MD;  Location: [facility]     D & C  10/26/09    with uterine ablation     ENDOVASCULAR PLACEMENT VASCULAR DEVICE Left     Left leg stent x 3     ESOPHAGOSCOPY, GASTROSCOPY, DUODENOSCOPY (EGD), COMBINED  4/21/2014    Procedure: Gastroscopy;  Surgeon: Moris Thomas MD;  Location: [facility]     HYSTERECTOMY, PAP NO LONGER INDICATED  1-4-2010     LAPAROSCOPIC CHOLECYSTECTOMY N/A 1/4/2019    Procedure: Laparoscopic cholecystectomy;  Surgeon: Aaron Siegel MD;  Location: [facility]     LITHOTRIPSY  2004    Lithotrypsy       Social History     Tobacco Use     Smoking status: Current Every Day Smoker     Packs/day: 0.50     Years: 43.00     Pack years: 21.50     Types: Cigarettes     Smokeless tobacco: Never Used     Tobacco comment: started at age 11.   Quit during pregnancyX4.  Quit 3 months ago.    Substance Use Topics     Alcohol use: No     Alcohol/week: 0.0 standard drinks     Comment: quit 1995     Family History   Problem Relation Age of Onset     Hypertension Mother      Diabetes Mother      Blood Disease Mother      Heart Disease Mother         chf     Cerebrovascular Disease Mother      Hypertension Father      Cancer Father         lung cancer     Allergies Sister      Diabetes Sister      Depression Sister      Hypertension Sister          Current Outpatient Medications   Medication Sig Dispense Refill     acetaminophen (TYLENOL) 500 MG tablet Take 500-1,000 mg by mouth every 6 hours as needed for mild pain       ADVAIR -21 MCG/ACT inhaler INHALE TWO PUFFS BY MOUTH TWICE A DAY 12 g 3     albuterol (PROAIR HFA/PROVENTIL HFA/VENTOLIN HFA) 108 (90 Base) MCG/ACT inhaler INHALE TWO PUFFS BY MOUTH EVERY 6 HOURS AS NEEDED FOR SHORTNESS OF BREATH DIFFICULTY BREATHING OR WHEEZING 18 g 0     albuterol (PROVENTIL) (2.5 MG/3ML) 0.083% neb solution Take 1 vial (2.5 mg) by nebulization every 6 hours as needed for shortness of breath / dyspnea or wheezing 1 Box 0     alcohol swab prep pads Use to swab area of injection/mike as directed. 100 each 3     allopurinol (ZYLOPRIM) 300 MG tablet TAKE ONE TABLET BY MOUTH ONCE DAILY 90 tablet 1     blood glucose (NO BRAND SPECIFIED) test strip Use to test blood sugar 2-3x/day. To accompany: Blood Glucose Monitor Brands: per insurance. 100 strip 11     blood glucose calibration (NO BRAND SPECIFIED) solution To accompany: Blood Glucose Monitor Brands: per insurance. 1 Bottle 3     blood glucose monitoring (NO BRAND SPECIFIED) meter device kit Use to test blood sugar daily or as directed. Preferred blood glucose meter OR supplies to accompany: Blood Glucose Monitor Brands: per insurance. 1 kit 0     buPROPion (WELLBUTRIN XL) 150 MG 24 hr tablet TAKE 1 TABLET BY MOUTH EVERY EVENING 150 tablet 0     furosemide (LASIX) 40 MG  tablet Take 1 tablet (40 mg) by mouth 2 times daily 180 tablet 4     gabapentin (NEURONTIN) 300 MG capsule Take 1 capsule (300 mg) by mouth At Bedtime TAKE ONE CAPSULE BY MOUTH ONCE DAILY AT BEDTIME WITH A 600 MG TABLET FOR A TOTAL DOSE  MG 90 capsule 3     gabapentin (NEURONTIN) 600 MG tablet TAKE ONE TABLET BY MOUTH THREE TIMES A DAY 90 tablet 3     ipratropium - albuterol 0.5 mg/2.5 mg/3 mL (DUONEB) 0.5-2.5 (3) MG/3ML neb solution Take 1 vial (3 mLs) by nebulization every 6 hours as needed for shortness of breath / dyspnea or wheezing 30 vial 11     lamoTRIgine (LAMICTAL) 100 MG tablet TAKE ONE TABLET BY MOUTH ONCE DAILY 90 tablet 1     levothyroxine (SYNTHROID/LEVOTHROID) 175 MCG tablet TAKE ONE TABLET BY MOUTH ONCE DAILY 90 tablet 0     metFORMIN (GLUCOPHAGE) 1000 MG tablet Take 1 tablet (1,000 mg) by mouth 2 times daily (with meals) 180 tablet 1     mometasone-formoterol (DULERA) 200-5 MCG/ACT oral inhaler Inhale 2 puffs into the lungs 2 times daily 13 g 1     omeprazole (PRILOSEC) 20 MG DR capsule Take 1 capsule (20 mg) by mouth 2 times daily 60 capsule 11     order for DME Equipment being ordered:x2 [employer] 30/40mmHg compression wraps with x2 extra prs of compression liners 1 each 0     order for DME Equipment being ordered: Nebulizer 1 Device 0     order for DME Equipment being ordered: CPAP  AIRSENSE 10  5-18 CM H20  # [id number]   DN# [id number]       order for DME Equipment being ordered: DEPENDS SIZE LARGE 60 Month 5     order for DME Equipment being ordered: INCONTINENCE PADS   QID 1 Month 11     potassium chloride ER (KLOR-CON M) 10 MEQ CR tablet TAKE ONE TABLET BY MOUTH ONCE DAILY 90 tablet 2     pramipexole (MIRAPEX) 0.125 MG tablet Take 1-20 tablets (0.125-2.5 mg) by mouth At Bedtime TAKE 1-2 TABLETS BY MOUTH AT BEDTIME 180 tablet 1     simvastatin (ZOCOR) 20 MG tablet TAKE ONE TABLET BY MOUTH EVERY NIGHT AT BEDTIME (Patient taking differently: Take 20 mg by mouth At Bedtime ) 90 tablet 3      thin (NO BRAND SPECIFIED) lancets Use with lanceting device. To accompany: Blood Glucose Monitor Brands: per insurance. 1 each 6     traZODone (DESYREL) 150 MG tablet TAKE ONE TABLET BY MOUTH EVERY NIGHT AT BEDTIME 90 tablet 1     trimethoprim-polymyxin b (POLYTRIM) 40012-5.1 UNIT/ML-% ophthalmic solution Place 1-2 drops Into the left eye every 6 hours 10 mL 0     VITAMIN D3 25 MCG (1000 UT) tablet TAKE ONE TABLET BY MOUTH ONCE DAILY 100 tablet 2     warfarin ANTICOAGULANT (JANTOVEN ANTICOAGULANT) 5 MG tablet TAKE TWO AND ONE-HALF TABLETS BY MOUTH EVERY MONDAY, TWO TABLETS ON ALL OTHER DAYS OR AS DIRECTED BY ANTICOAGULATION CLINIC 175 tablet 0     ARIPiprazole (ABILIFY) 10 MG tablet TAKE ONE TABLET BY MOUTH ONCE DAILY 90 tablet 1     EPINEPHrine (ANY BX GENERIC EQUIV) 0.3 MG/0.3ML injection 2-pack Inject 0.3 mLs (0.3 mg) into the muscle once as needed for anaphylaxis (Patient not taking: Reported on 6/24/2020) 0.6 mL 0     Allergies   Allergen Reactions     Darvocet [Propoxyphene N-Apap] Anaphylaxis     Darvocet,Percocet      Percocet [Oxycodone-Acetaminophen] Anaphylaxis, Hives and Swelling     Has tolerated hydromorphone in the past.      Asa [Aspirin] Hives     Aspirin causes seizures and hives, throat swelling.   Has tolerated ketorolac in the past.        Bee      Ibuprofen Swelling     Throat swelling per patient      Lyrica [Pregabalin] Other (See Comments) and Swelling     dizziness     Povidone Iodine Rash     Reaction to topical betadine     Tape [Adhesive Tape] Rash       Reviewed and updated as needed this visit by Provider         Review of Systems   Constitutional, HEENT, cardiovascular, pulmonary, gi and gu systems are negative, except as otherwise noted.       Objective   Reported vitals:  LMP 09/27/2009    healthy, alert and no distress  PSYCH: Alert and oriented times 3; coherent speech, normal   rate and volume, able to articulate logical thoughts, able   to abstract reason, no tangential  thoughts, no hallucinations   or delusions  Her affect is normal  RESP: No cough, no audible wheezing, able to talk in full sentences  Remainder of exam unable to be completed due to telephone visits    Diagnostic Test Results:  Labs reviewed in Epic        Assessment/Plan:    (G63) Polyneuropathy in other diseases classified elsewhere (H)  (primary encounter diagnosis)  Good control.  Continue currant medications, continue to monito     (I73.9) PVD (peripheral vascular disease) with claudication (H)  Good control.  Continue currant medications, continue to monito *    (J44.1) Chronic obstructive pulmonary disease with acute exacerbation (H)  gopo     (E66.2) Morbid obesity with alveolar hypoventilation (H)  Not eatting as well as she should stress eattig with covid related sress      (E11.9) Type 2 diabetes mellitus without complication, without long-term current use of insulin (H)  Good control.  Continue currant medications, continue to monito but not eatting as wewll lately enxcouraged     (I10) HTN, goal below 140/90  Will need bp check and labs at marilee xt visit    (D69.6) Thrombocytopenia (H)  Will follow platlets at next visit    (F31.62) Moderate mixed bipolar I disorder (H)  Has been stressed with covid 19 socialsituatiopn.  weatign more stress eating.  Discussed self relaxation  She wants to take or ewals.  ,rtc3          Phone call duration:  13 minutes    Kd Leong MD

## 2020-07-29 NOTE — PROGRESS NOTES
"[facility] ADMISSION NOTE    Patient admitted to room 1010 at approximately 2115   via cart from emergency room. Patient was accompanied by transport tech.     Verbal SBAR report received from GracieED RN prior to patient arrival.     Patient ambulated to bed with one assist. Patient alert and oriented X 3. Pain is controlled without any medications. 0-10 Pain Scale: 5. Admission vital signs: Blood pressure (!) 141/82, pulse 73, temperature 97.8  F (36.6  C), temperature source Oral, resp. rate 20, height 1.473 m (4' 10\"), weight 107.1 kg (236 lb 1.8 oz), last menstrual period 09/27/2009, SpO2 95 %, not currently breastfeeding. Patient was oriented to plan of care, call light, bed controls, tv, telephone, bathroom and visiting hours.     Risk Assessment    The following safety risks were identified during admission: fall. Yellow risk band applied: YES.     Skin Initial Assessment    This writer admitted this patient and completed a full skin assessment and Brandon score in the Adult PCS flowsheet. Appropriate interventions initiated as needed.     Secondary skin check completed by Jia Landrum, ICU RN.    Brandon Risk Assessment  Sensory Perception: 3-->slightly limited  Moisture: 2-->very moist  Activity: 3-->walks occasionally  Mobility: 3-->slightly limited  Nutrition: 3-->adequate  Friction and Shear: 3-->no apparent problem  Brandon Score: 17  Bed Support Surface: Atmos Air mattress  Reassessed using Bed Algorithm: No  Brandon Intervention(s) Implemented: draw sheets, heels suspended, incontinence care, patient education on pressure relief reinforced, pulse oximeter site changed, repositioned/turned q2hr    Education    Patient has a [place] to Observation order: No  Observation education completed and documented: N/A      Cindy Johanson, RN    " Patient admitted to ARU. Patient oriented to room and call light. Patient sitting in chair eating dinner with chair alarm activated. Will continue to monitor.

## 2020-07-30 ENCOUNTER — INFUSION THERAPY VISIT (OUTPATIENT)
Dept: INFUSION THERAPY | Facility: CLINIC | Age: 55
End: 2020-07-30
Attending: INTERNAL MEDICINE
Payer: COMMERCIAL

## 2020-07-30 VITALS — TEMPERATURE: 99.1 F | HEART RATE: 75 BPM | DIASTOLIC BLOOD PRESSURE: 41 MMHG | SYSTOLIC BLOOD PRESSURE: 105 MMHG

## 2020-07-30 DIAGNOSIS — D75.1 POLYCYTHEMIA, SECONDARY: Primary | ICD-10-CM

## 2020-07-30 PROCEDURE — 99195 PHLEBOTOMY: CPT

## 2020-07-30 NOTE — PROGRESS NOTES
Infusion Nursing Note:  Bia Bill presents today for phlebotomy   Patient seen by provider today: No   present during visit today: Not Applicable.    Note: N/A.    Intravenous Access:  Peripheral IV placed.    Treatment Conditions:  Results reviewed, labs MET treatment parameters, ok to proceed with treatment.  Hematocrit 48.6 .      Post Infusion Assessment:  Patient tolerated phlebotomy without incident.  Access discontinued per protocol.       Discharge Plan:   Patient discharged in stable condition accompanied by: self.  Departure Mode: Ambulatory.  Will clarify with Dr. Watts when pt should return for labs and phlebotomy.  Pt aware.      Terrie Rivera RN

## 2020-07-31 DIAGNOSIS — F33.1 MAJOR DEPRESSIVE DISORDER, RECURRENT EPISODE, MODERATE (H): ICD-10-CM

## 2020-08-03 RX ORDER — ARIPIPRAZOLE 10 MG/1
TABLET ORAL
Qty: 90 TABLET | Refills: 1 | Status: SHIPPED | OUTPATIENT
Start: 2020-08-03

## 2020-08-04 ENCOUNTER — PATIENT OUTREACH (OUTPATIENT)
Dept: CARE COORDINATION | Facility: CLINIC | Age: 55
End: 2020-08-04

## 2020-08-04 NOTE — PROGRESS NOTES
Clinic Care Coordination Contact    Follow Up Progress Note      Assessment: Outreach to patient.    Left message on patient's voicemail and requested return call.     Per chart review: patient had phlebotomy on 7/30/20 for polycythemia. This is scheduled for monthly.     Goals addressed this encounter:   Goals Addressed    None     Intervention/Education provided during outreach: Requested return call     Outreach Frequency: monthly    Plan:   Patient will return call to clinic care coordinator.    Care Coordinator will follow up in one month.    Michela Whitaker RN, [facility] RN Coordinator  [facility]   8/4/2020    12:17 PM  [phone]

## 2020-08-08 DIAGNOSIS — Z86.718 HISTORY OF RECURRENT DEEP VEIN THROMBOSIS (DVT): ICD-10-CM

## 2020-08-08 DIAGNOSIS — E11.9 TYPE 2 DIABETES MELLITUS WITHOUT COMPLICATION, WITHOUT LONG-TERM CURRENT USE OF INSULIN (H): ICD-10-CM

## 2020-08-10 RX ORDER — WARFARIN SODIUM 5 MG/1
TABLET ORAL
Qty: 157 TABLET | Refills: 1 | Status: ON HOLD | OUTPATIENT
Start: 2020-08-10 | End: 2021-01-03

## 2020-08-10 NOTE — TELEPHONE ENCOUNTER
"Prescription approved per [facility] Refill Protocol.      Requested Prescriptions   Pending Prescriptions Disp Refills     metFORMIN (GLUCOPHAGE) 1000 MG tablet [Pharmacy Med Name: METFORMIN HCL 1000MG TABS] 180 tablet 1     Sig: TAKE ONE TABLET BY MOUTH TWICE A DAY WITH MEALS       Biguanide Agents Passed - 8/8/2020 12:08 PM        Passed - Patient is age 10 or older        Passed - Patient has documented A1c within the specified period of time.     If HgbA1C is 8 or greater, it needs to be on file within the past 3 months.  If less than 8, must be on file within the past 6 months.     Recent Labs   Lab Test 06/22/20  0950   A1C 6.2*             Passed - Patient's CR is NOT>1.4 OR Patient's EGFR is NOT<45 within past 12 mos.     Recent Labs   Lab Test 06/22/20  0950   GFRESTIMATED >90   GFRESTBLACK >90       Recent Labs   Lab Test 06/22/20  0950   CR 0.71             Passed - Patient does NOT have a diagnosis of CHF.        Passed - Medication is active on med list        Passed - Patient is not pregnant        Passed - Patient has not had a positive pregnancy test within the past 12 mos.         Passed - Recent (6 mo) or future (30 days) visit within the authorizing provider's specialty     Patient had office visit in the last 6 months or has a visit in the next 30 days with authorizing provider or within the authorizing provider's specialty.  See \"Patient Info\" tab in inbasket, or \"Choose Columns\" in Meds & Orders section of the refill encounter.               Brenna FLETCHER RN, BSN        "

## 2020-08-13 ENCOUNTER — PATIENT OUTREACH (OUTPATIENT)
Dept: NURSING | Facility: CLINIC | Age: 55
End: 2020-08-13
Payer: COMMERCIAL

## 2020-08-13 NOTE — PROGRESS NOTES
"Clinic Care Coordination Contact    Follow Up Progress Note      Assessment: Patient calling clinic care coordinator.     Patient states her blood sugar this am was 190. She denies eating or drinking anything before bedtime. She states she is checking her blood sugars in the morning and at dinner time. She states she has had a low of 87 recently. She states she tends to be high more than low. She states PCP discussed changing medications if her blood sugars were too high. She does track and record them. Advised if they were consistently running high she should call PCP. She is taking metformin BID    Patient states her sister is a type II diabetic also but she is on insulin. She states she has been helpful diet suggestions. We discussed diet at length. PCP had recommended patient cut back on sugars and eat more protein. She states she has cut way back on sweets. We discussed other carbohydrate foods that she may need to cut back on that are not \"sweets\" such as rice, potatoes, pasta.     Patient states when her blood sugars are too high she gets a headache and feels blurry. When she is low she gets shaky and feel nauseated.     Patient states she has been having trouble with her left eye. It is blurry. She sees Dr. Hodges in [place] for her eyes. She has an appointment for recheck in November.  She talks about her significant other. He has \"a hole in his retina, but is not wanting surgery to fix it.\" She worries he will go blind and then he will not be able to help her with the yard work and repairs around the mobile home.     Patient states she needs her furnace cleaned. She is waiting for her rent rebate check to clear so sh cn get this done. She states she is afraid to use her air conditioner so it is getting warm in the trailer. This is hard on her breathing.     Patient states her shoulder that she had surgery on several years ago has started to be painful and \"pops\" when she moves it.  We discussed " using pain patches for pain relief as she says Tylenol does nothing.      Goals addressed this encounter: Stable currently.  Goals Addressed                 This Visit's Progress      COMPLETED: #1 Medical (pt-stated)        Goal Statement: I want my cellulitis to heal.  Date goal set: 12/5/2019   Measure of Success: Cellulitis will be healed.  Barriers: current infection  Strengths: care coordination, lymphedema therapy  Date to Achieve By: July 2020   Patient expressed understanding of goal: yes    Action steps to achieve this goal  1. I will take and finish my antibiotics as prescribed  2. I will elevate my legs as much as possible for the first 48 hours  3. I will follow up with PCP as scheduled         #2 Medical (pt-stated)   60%     Goal Statement: I want my shortness of breath to improve    Date goal set: 11/1/19    Measure of Success: When I am not short of breath    Barriers: COPD    Strengths: Motivated    Date to Achieve By: December 2020    Patient expressed understanding of goal: Yes    Action steps to achieve this goal  1. I will get my nebulizer fixed - got new machine 1/2020  2. I will use nebulizer as instructed  3. I will see PCP  as scheduled  4. I will use my asthma action plan         #3 Improve chronic symptoms (pt-stated)   60%     Goal Statement: I want my edema to improve.  Date goal set: 12/5/2019   Measure of Success: I will be able to put support stockings on by myself.  Barriers: lymphedema  Strengths: care coordination, lymphedema clinic  Date to Achieve By: Cecember 2020  Patient expressed understanding of goal: yes    Action steps to achieve this goal  1. I will elevate my legs throughout the day  2. I will attend lymphedema clinic appointments  3. I will take my diuretics as prescribed         #4 Monitoring (pt-stated)   60%     Goal Statement: I will weigh myself every day.  Date goal set: 12/5/2019   Measure of Success: Consistent record of daily weights.  Barriers: Does not like  scale  Strengths: care coordination support  Date to Achieve By: December 2020  Patient expressed understanding of goal: yes    Action steps to achieve this goal  1. I will weigh myself every morning.  2. I will write my weight down in a notebook.  3. I will take an extra 20-40 mg of Lasix if my weight increases by 3 lbs.         COMPLETED: Monitoring (pt-stated)        Continue to check blood sugars 6+ times weekly x 2 months        Intervention/Education provided during outreach: As above     Outreach Frequency: monthly    Plan:   Patient will follow up with PCP if blood sugars remain consistently high.     Care Coordinator will follow up in one month.    Michela Whitaker RN, [facility] RN Coordinator  [facility]   8/13/2020    3:28 PM  [phone]

## 2020-08-18 DIAGNOSIS — E03.9 HYPOTHYROIDISM, UNSPECIFIED TYPE: Chronic | ICD-10-CM

## 2020-08-18 RX ORDER — LEVOTHYROXINE SODIUM 175 UG/1
TABLET ORAL
Qty: 90 TABLET | Refills: 0 | Status: SHIPPED | OUTPATIENT
Start: 2020-08-18 | End: 2020-12-08

## 2020-08-21 ENCOUNTER — HOSPITAL ENCOUNTER (OUTPATIENT)
Dept: LAB | Facility: CLINIC | Age: 55
Discharge: HOME | End: 2020-08-21
Attending: INTERNAL MEDICINE | Admitting: INTERNAL MEDICINE
Payer: COMMERCIAL

## 2020-08-21 ENCOUNTER — ANTICOAGULATION THERAPY VISIT (OUTPATIENT)
Dept: FAMILY MEDICINE | Facility: CLINIC | Age: 55
End: 2020-08-21

## 2020-08-21 ENCOUNTER — INFUSION THERAPY VISIT (OUTPATIENT)
Dept: INFUSION THERAPY | Facility: CLINIC | Age: 55
End: 2020-08-21
Attending: INTERNAL MEDICINE
Payer: COMMERCIAL

## 2020-08-21 ENCOUNTER — VIRTUAL VISIT (OUTPATIENT)
Dept: PSYCHOLOGY | Facility: CLINIC | Age: 55
End: 2020-08-21
Payer: COMMERCIAL

## 2020-08-21 VITALS
OXYGEN SATURATION: 94 % | SYSTOLIC BLOOD PRESSURE: 119 MMHG | TEMPERATURE: 96.6 F | HEART RATE: 75 BPM | DIASTOLIC BLOOD PRESSURE: 62 MMHG | RESPIRATION RATE: 16 BRPM

## 2020-08-21 DIAGNOSIS — Z86.73 HISTORY OF STROKE: ICD-10-CM

## 2020-08-21 DIAGNOSIS — I82.409 DVT (DEEP VENOUS THROMBOSIS) (H): ICD-10-CM

## 2020-08-21 DIAGNOSIS — D75.1 ERYTHROCYTOSIS: Primary | ICD-10-CM

## 2020-08-21 DIAGNOSIS — I73.9 PVD (PERIPHERAL VASCULAR DISEASE) WITH CLAUDICATION (H): ICD-10-CM

## 2020-08-21 DIAGNOSIS — D75.1 POLYCYTHEMIA, SECONDARY: ICD-10-CM

## 2020-08-21 DIAGNOSIS — F31.62 MODERATE MIXED BIPOLAR I DISORDER (H): Primary | ICD-10-CM

## 2020-08-21 LAB
BASOPHILS # BLD AUTO: 0 10E9/L (ref 0–0.2)
BASOPHILS NFR BLD AUTO: 0.6 %
DIFFERENTIAL METHOD BLD: ABNORMAL
EOSINOPHIL # BLD AUTO: 0.1 10E9/L (ref 0–0.7)
EOSINOPHIL NFR BLD AUTO: 1.5 %
ERYTHROCYTE [DISTWIDTH] IN BLOOD BY AUTOMATED COUNT: 20 % (ref 10–15)
HCT VFR BLD AUTO: 44.5 % (ref 35–47)
HGB BLD-MCNC: 12.5 G/DL (ref 11.7–15.7)
IMM GRANULOCYTES # BLD: 0 10E9/L (ref 0–0.4)
IMM GRANULOCYTES NFR BLD: 0.2 %
INR PPP: 2.5 (ref 0.86–1.14)
LYMPHOCYTES # BLD AUTO: 1 10E9/L (ref 0.8–5.3)
LYMPHOCYTES NFR BLD AUTO: 20.5 %
MCH RBC QN AUTO: 23.6 PG (ref 26.5–33)
MCHC RBC AUTO-ENTMCNC: 28.1 G/DL (ref 31.5–36.5)
MCV RBC AUTO: 84 FL (ref 78–100)
MONOCYTES # BLD AUTO: 0.2 10E9/L (ref 0–1.3)
MONOCYTES NFR BLD AUTO: 5.2 %
NEUTROPHILS # BLD AUTO: 3.3 10E9/L (ref 1.6–8.3)
NEUTROPHILS NFR BLD AUTO: 72 %
NRBC # BLD AUTO: 0 10*3/UL
NRBC BLD AUTO-RTO: 0 /100
PLATELET # BLD AUTO: 105 10E9/L (ref 150–450)
RBC # BLD AUTO: 5.29 10E12/L (ref 3.8–5.2)
WBC # BLD AUTO: 4.6 10E9/L (ref 4–11)

## 2020-08-21 PROCEDURE — 90832 PSYTX W PT 30 MINUTES: CPT | Mod: 95 | Performed by: PSYCHOLOGIST

## 2020-08-21 PROCEDURE — 99195 PHLEBOTOMY: CPT

## 2020-08-21 PROCEDURE — 85025 COMPLETE CBC W/AUTO DIFF WBC: CPT | Performed by: INTERNAL MEDICINE

## 2020-08-21 PROCEDURE — 36415 COLL VENOUS BLD VENIPUNCTURE: CPT | Performed by: INTERNAL MEDICINE

## 2020-08-21 PROCEDURE — 85610 PROTHROMBIN TIME: CPT | Performed by: INTERNAL MEDICINE

## 2020-08-21 ASSESSMENT — PAIN SCALES - GENERAL: PAINLEVEL: NO PAIN (0)

## 2020-08-21 NOTE — PROGRESS NOTES
"                                           Progress Note  Disclaimer: Voice recognition software was used to generate this note. As a result, wrong word or 'sound-a-like' substitutions may have occurred due to the inherent limitations of voice recognition software. There may be errors in the script that have gone undetected. Please consider this when interpreting information found in this chart.     Patient Name: Bia Bill  Date: 8/21/2020         Service Type: Individual      Session Start Time: 1:00PM  session End Time: 1:30 PM  Session Length: 30    Session #: 63    Attendees: Client attended alone    Service Modality:  Phone Visit:    The patient has been notified of the following:      \"We have found that certain health care needs can be provided without the need for a face to face visit.  This service lets us provide the care you need with a phone conversation.       I will have full access to your [facility] medical record during this entire phone call.   I will be taking notes for your medical record.      Since this is like an office visit, we will bill your insurance company for this service.       There are potential benefits and risks of telephone visits (e.g. limits to patient confidentiality) that differ from in-person visits.?  Confidentiality still applies for telephone services, and nobody will record the visit.  It is important to be in a quiet, private space that is free of distractions (including cell phone or other devices) during the visit.??      If during the course of the call I believe a telephone visit is not appropriate, you will not be charged for this service\"     Consent has been obtained for this service by care team member: Yes      Treatment Plan Last Reviewed: 7/24/2020  PHQ-9 / CHIN-7 : see flowsheets    DATA  Interactive Complexity: No   Crisis: No       Progress Since Last Session (Related to Symptoms / Goals / Homework):   Symptoms: No change stable    Homework: Achieved / " completed to satisfaction      Episode of Care Goals: Satisfactory progress - MAINTENANCE (Working to maintain change, with risk of relapse); Intervened by continuing to positively reinforce healthy behavior choice      Current / Ongoing Stressors and Concerns:   Longtime history of bipolar disorder. Family stress.  Went camping with her sister. Good to get out.     Treatment Objective(s) Addressed in This Session:   identify 2 strategies for more effectively managing Bipolar Disorder    Sleep has been really bad dt not having c-pap.     Intervention:   CBT: behavioral activation        ASSESSMENT: Current Emotional / Mental Status (status of significant symptoms):   Risk status (Self / Other harm or suicidal ideation)   Patient denies current fears or concerns for personal safety.   Patient denies current or recent suicidal ideation or behaviors.   Patient denies current or recent homicidal ideation or behaviors.   Patient denies current or recent self injurious behavior or ideation.   Patient denies other safety concerns.   Patient reports there has been no change in risk factors since their last session.     Patient reports there has been no change in protective factors since their last session.     Recommended that patient call 911 or go to the local ED should there be a change in any of these risk factors.     Appearance:   Unable to assess    Eye Contact:   Unable to assess    Psychomotor Behavior: Unable to assess    Attitude:   Cooperative    Orientation:   All   Speech    Rate / Production: Normal     Volume:  Normal    Mood:    Normal   Affect:    Appropriate    Thought Content:  Clear    Thought Form:  Coherent  Logical    Insight:    Good      Medication Review:   No changes to current psychiatric medication(s)     Medication Compliance:   Yes     Changes in Health Issues:   None reported     Chemical Use Review:   Substance Use: Chemical use reviewed, no active concerns identified      Tobacco Use: No  change in amount of tobacco use since last session.  Provided encouragement to quit     Diagnosis:  1. Moderate mixed bipolar I disorder (H)        Collateral Reports Completed:   Not Applicable    PLAN: (Patient Tasks / Therapist Tasks / Other)  Client to maintain boundaries with family. Try to quit or reduce smoking, do one activity per day from her self care list.         Kd Morris                                                         ______________________________________________________________________       Treatment Plan     Client's Name: Bia Bill               YOB: 1965     Date: 4/28/2020        DSM5 Diagnoses: (Sustained by DSM5 Criteria Listed Above)  Diagnoses: 296.40 Bipolar I Disorder Current or Most Recent Episode Manic, Unspecified  Psychosocial & Contextual Factors: financial difficulties, chronic pain, roommate issues  WHODAS 2.0 (12 item)               This questionnaire asks about difficulties due to health conditions. Health conditions             include disease or illnesses, other health problems that may be short or long lasting,             injuries, mental health or emotional problems, and problems with alcohol or drugs.                         Think back over the past 30 days and answer these questions, thinking about how much             difficulty you had doing the following activities. For each question, please [place] only             one response.      S1  Standing for long periods such as 30 minutes?  Mild =           2    S2  Taking care of household responsibilities?  Moderate =   3    S3  Learning a new task, for example, learning how to get to a new place?  Mild =           2    S4  How much of a problem do you have joining community activities (for example, festivals, Zoroastrian or other activities) in the same way as anyone else can?  Moderate =   3    S5  How much have you been emotionally affected by your health problems?  Mild =           2              In the past 30 days, how much difficulty did you have in:    S6  Concentrating on doing something for ten minutes?  Mild =           2    S7  Walking a long distance such as a kilometer (or equivalent)?  Severe =       4    S8  Washing your whole body?  None =         1    S9  Getting dressed?  None =         1    S10  Dealing with people you do not know?  Moderate =   3    S11  Maintaining a friendship?  Severe =       4    S12  Your day to day work?  Moderate =   3       H1  Overall, in the past 30 days, how many days were these difficulties present?  Record number of days seven    H2  In the past 30 days, for how many days were you totally unable to carry out your usual activities or work because of any health condition?  Record number of days  seven    H3  In the past 30 days, not counting the days that you were totally unable, for how many days did you cut back or reduce your usual activities or work because of any health condition?  Record number of days five                      Referral / Collaboration:  Referral to another professional/service is not indicated at this time..     Anticipated number of session or this episode of care: 60     Goals  1. Education- the Biopsychosocial model of depression  a. Client will be able to describe how depression is effecting them physically, emotionally and socially  2. Behavioral Activation  a. Client will learn to assess their depression on a day to day basis  b. Client will Identify two forms of exercise/activity and engage in them 3 times per week  c. Client will Identify 3 things that make them laugh, and engage in these 5 times per week.  d. Client will Identify 1-2Creative activities or hobbies  and engage in them 2 times per week  e. Client will identify music, movies, books that make them feel good and use them 3-4 times per week  3. Self-care  a. Client will identify 5 things they can do just for themselves  b. Client will take time for quiet,  reflection, meditation 5 times per week  c. Client will Learn to set boundaries when appropriate  d. Client will Identify 2 individuals they can call on for support, distraction  4. Assessment of progress  a. Client will engage in assessment of their progress on a regular basis     Bipolar Disorder  Treatment plan:  1. Education- the Biopsychosocial model of Bipolar Disorder    a. Client will be able to describe in general terms what Bipolar Disorder  is and is not  b. Client will be able to describe how Bipolar Disorder  has affected their life in at least two different areas, such as school or work and Home/relationships  c. Clients parents/guardians or significant others will be provided information on what Bipolar Disorder  is and the ways it can affect relationships and be encouraged to be a part of clients treatment team.  2. Medication  a. Client will participate in medication evaluation for Bipolar Disorder  symptoms and follow medication recommendations.   3. Identification and management of triggers  a. Client and therapist will examine patient s history to determine if there are predictable triggers for manic or depressive episodes (e.g. boredom, anger, family stress)  b. Client and therapist will develop means of diffusing these triggers (e.g. relaxation strategies, boundary setting, anger management)  4. Comorbid conditions   a. Client and therapist will asses for comorbid conditions (e.g. anxiety, depression, substance use). and add additional items to treatment plan as needed  5. Self-care  a. Client will identify 3 things they can do just for themselves  b. Client will take time for quiet, reflection, meditation 3 times per week  c. Client will Learn to set boundaries when appropriate  d. Client will Identify 2 individuals they can call on for support, distraction  5. Behavioral Activation  a. Client will Identify two forms of exercise/activity and engage in them 3 times per week  b. Client will  Identify 2 things that make them laugh, and engage in these 3 times per week.  c. Client will Identify 2 Creative activities or hobbies  and engage in them 2 times per week  d. Client will identify music, movies, books that make them feel good and use them 3 times per week  6. Assessment of progress  a. Client will engage in assessment of their progress on a regular basis     Client has reviewed and agreed to the above plan.        Kd Morris             7/24/2020

## 2020-08-21 NOTE — PROGRESS NOTES
Anticoagulation Management    Unable to reach [facility] today.    Today's INR result of 2.5 is therapeutic (goal INR of 2.0-3.0).  Result received from: Clinic Lab    Follow up required to confirm warfarin dose taken and assess for changes    Left msg to call back      Anticoagulation clinic to follow up    Elisa Bennett RN

## 2020-08-21 NOTE — PROGRESS NOTES
Nursing Note:  Bia Bill presents today for phlebotomy.    Patient seen by provider today: No   present during visit today: Not Applicable.    Note: N/A.    Intravenous Access:  Peripheral IV placed.  Start time 2837-5940  500cc removed  Discharge Plan:     Next appt 9/18/20  Milady Roach RN

## 2020-08-21 NOTE — PROGRESS NOTES
ANTICOAGULATION MANAGEMENT     Patient Name:  Bia Bill  Date:  2020    ASSESSMENT /SUBJECTIVE:    Today's INR result of 2.5 is therapeutic. Goal INR of 2.0-3.0      Warfarin dose taken: Warfarin taken as previously instructed    Diet: No new diet changes affecting INR    Medication changes/ interactions: No new medications/supplements affecting INR    Previous INR: Therapeutic     S/S of bleeding or thromboembolism: No    New injury or illness: No    Upcoming surgery, procedure or cardioversion: No    Additional findings: None      PLAN:    Spoke with Bia regarding INR result and instructed:     Warfarin Dosing Instructions: Continue your current warfarin dose 12.5 mg every Mon; 10 mg all other days    Instructed patient to follow up no later than: 4 weeks      Education provided: Importance of consistent vitamin K intake, Target INR goal and significance of current INR result, Importance of notifying clinic for changes in medications and Importance of notifying clinic for diarrhea, nausea/vomiting, reduced intake and/or illness      Norah verbalizes understanding and agrees to warfarin dosing plan.    Instructed to call the Anticoagulation Clinic for any changes, questions or concerns. (#[phone])        Elisa Bennett RN      OBJECTIVE:  Recent labs: (last 7 days)     20  0913   INR 2.50*         No question data found.  Anticoagulation Summary  As of 2020    INR goal:   2.0-3.0   TTR:   71.1 % (11.7 mo)   INR used for dosin.50 (2020)   Warfarin maintenance plan:   12.5 mg (5 mg x 2.5) every Mon; 10 mg (5 mg x 2) all other days   Full warfarin instructions:   12.5 mg every Mon; 10 mg all other days   Weekly warfarin total:   72.5 mg   Plan last modified:   Carmen Way RN (2020)   Next INR check:   2020   Priority:   Maintenance   Target end date:   Indefinite    Indications    PVD (peripheral vascular disease) with claudication (H) [I73.9]  Long term  current use of anticoagulant therapy (Resolved) [Z79.01]  DVT (deep venous thrombosis) (H) [I82.409]  History of stroke symptoms  stroke ruled out on imaging [Z86.73]             Anticoagulation Episode Summary     INR check location:       Preferred lab:       Send INR reminders to:   ANGI BHAKTA    Comments:   * lab draw due to elevated hematocrit (fingerstick meters don't work). Needs to have adjusted INR if hct over 55%. LEFT LE. STENT x 3 LEFT UPPER LEG. Previous arterial clot. AS of 10/9/19 Goal range 2.0-3.0.      Anticoagulation Care Providers     Provider Role Specialty Phone number    Kd Leong MD [facility] [phone]

## 2020-09-04 ENCOUNTER — VIRTUAL VISIT (OUTPATIENT)
Dept: PSYCHOLOGY | Facility: CLINIC | Age: 55
End: 2020-09-04
Payer: COMMERCIAL

## 2020-09-04 DIAGNOSIS — F31.62 MODERATE MIXED BIPOLAR I DISORDER (H): Primary | ICD-10-CM

## 2020-09-04 PROCEDURE — 90834 PSYTX W PT 45 MINUTES: CPT | Mod: 95 | Performed by: PSYCHOLOGIST
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
## 2020-09-17 DIAGNOSIS — E78.00 HYPERCHOLESTEREMIA: ICD-10-CM

## 2020-09-17 RX ORDER — SIMVASTATIN 20 MG
20 TABLET ORAL AT BEDTIME
Qty: 90 TABLET | Refills: 1 | Status: SHIPPED | OUTPATIENT
Start: 2020-09-17 | End: 2020-09-21

## 2020-09-18 ENCOUNTER — ANTICOAGULATION THERAPY VISIT (OUTPATIENT)
Dept: FAMILY MEDICINE | Facility: CLINIC | Age: 55
End: 2020-09-18

## 2020-09-18 ENCOUNTER — HOSPITAL ENCOUNTER (OUTPATIENT)
Dept: LAB | Facility: CLINIC | Age: 55
Discharge: HOME | End: 2020-09-18
Attending: INTERNAL MEDICINE | Admitting: INTERNAL MEDICINE
Payer: COMMERCIAL

## 2020-09-18 ENCOUNTER — INFUSION THERAPY VISIT (OUTPATIENT)
Dept: INFUSION THERAPY | Facility: CLINIC | Age: 55
End: 2020-09-18
Attending: INTERNAL MEDICINE
Payer: COMMERCIAL

## 2020-09-18 DIAGNOSIS — E78.00 HYPERCHOLESTEREMIA: ICD-10-CM

## 2020-09-18 DIAGNOSIS — I82.409 DVT (DEEP VENOUS THROMBOSIS) (H): ICD-10-CM

## 2020-09-18 DIAGNOSIS — Z86.73 HISTORY OF STROKE: ICD-10-CM

## 2020-09-18 DIAGNOSIS — D75.1 ERYTHROCYTOSIS: ICD-10-CM

## 2020-09-18 DIAGNOSIS — I73.9 PVD (PERIPHERAL VASCULAR DISEASE) WITH CLAUDICATION (H): ICD-10-CM

## 2020-09-18 LAB
BASOPHILS # BLD AUTO: 0 10E9/L (ref 0–0.2)
BASOPHILS NFR BLD AUTO: 0.3 %
DIFFERENTIAL METHOD BLD: ABNORMAL
EOSINOPHIL # BLD AUTO: 0.1 10E9/L (ref 0–0.7)
EOSINOPHIL NFR BLD AUTO: 1 %
ERYTHROCYTE [DISTWIDTH] IN BLOOD BY AUTOMATED COUNT: 20.3 % (ref 10–15)
HCT VFR BLD AUTO: 43.5 % (ref 35–47)
HGB BLD-MCNC: 12 G/DL (ref 11.7–15.7)
IMM GRANULOCYTES # BLD: 0 10E9/L (ref 0–0.4)
IMM GRANULOCYTES NFR BLD: 0.5 %
INR PPP: 1.99 (ref 0.86–1.14)
LYMPHOCYTES # BLD AUTO: 1 10E9/L (ref 0.8–5.3)
LYMPHOCYTES NFR BLD AUTO: 15.7 %
MCH RBC QN AUTO: 23.3 PG (ref 26.5–33)
MCHC RBC AUTO-ENTMCNC: 27.6 G/DL (ref 31.5–36.5)
MCV RBC AUTO: 84 FL (ref 78–100)
MONOCYTES # BLD AUTO: 0.5 10E9/L (ref 0–1.3)
MONOCYTES NFR BLD AUTO: 7.5 %
NEUTROPHILS # BLD AUTO: 4.6 10E9/L (ref 1.6–8.3)
NEUTROPHILS NFR BLD AUTO: 75 %
NRBC # BLD AUTO: 0 10*3/UL
NRBC BLD AUTO-RTO: 0 /100
PLATELET # BLD AUTO: 119 10E9/L (ref 150–450)
RBC # BLD AUTO: 5.16 10E12/L (ref 3.8–5.2)
WBC # BLD AUTO: 6.1 10E9/L (ref 4–11)

## 2020-09-18 PROCEDURE — 85025 COMPLETE CBC W/AUTO DIFF WBC: CPT | Performed by: INTERNAL MEDICINE

## 2020-09-18 PROCEDURE — 85610 PROTHROMBIN TIME: CPT | Performed by: INTERNAL MEDICINE

## 2020-09-18 PROCEDURE — 36415 COLL VENOUS BLD VENIPUNCTURE: CPT | Performed by: INTERNAL MEDICINE

## 2020-09-18 NOTE — PROGRESS NOTES
Anticoagulation Management    Unable to reach [facility] today.    Today's INR result of 1.99 is subtherapeutic (goal INR of 2.0-3.0).  Result received from: Clinic Lab    Follow up required to discuss dosing instructions and confirm understanding of instructions    LMTCB - take same dose over weekend & call back clinic before 530 tonight or Monday morning to discuss any changes.       Anticoagulation clinic to follow up    Tana Rodrigues RN, BSN, PHN

## 2020-09-18 NOTE — PROGRESS NOTES
Nursing Note:  Bia Bill presents today for possible phleb.    Patient seen by provider today: No   present during visit today: Not Applicable.    Note: no phleb hematocrit 43.5 phleb orders for hematocrit >45    Intravenous Access:  n/a.    Discharge Plan:   Patient next appointment 10/16/20    Milady Roach RN

## 2020-09-18 NOTE — PROGRESS NOTES
ANTICOAGULATION MANAGEMENT     Patient Name:  Bia Bill  Date:  2020    ASSESSMENT /SUBJECTIVE:    Today's INR result of 1.99 is subtherapeutic. Goal INR of 2.0-3.0      Warfarin dose taken: Warfarin taken as previously instructed    Diet: Started drinking V8 juice once a week may be affecting diet and INR    Medication changes/ interactions: See above    Previous INR: Therapeutic     S/S of bleeding or thromboembolism: No    New injury or illness: No    Upcoming surgery, procedure or cardioversion: No    Additional findings: None      PLAN:    Spoke with Bia regarding INR result and instructed:     Warfarin Dosing Instructions: Take 12.5mg today then continue your current warfarin dose of 12.5mg Monday & 10mg AOD    Instructed patient to follow up no later than: 3 weeks  Lab visit scheduled - requested by patient to go out 3 weeks due to schedule.    Education provided: Importance of following up for INR monitoring at instructed interval      Norah verbalizes understanding and agrees to warfarin dosing plan.    Instructed to call the Anticoagulation Clinic for any changes, questions or concerns. (#[phone])        Tana Kraus RN      OBJECTIVE:  Recent labs: (last 7 days)     20  0932   INR 1.99*         No question data found.  Anticoagulation Summary  As of 2020    INR goal:   2.0-3.0   TTR:   73.4 % (11.7 mo)   INR used for dosin.99! (2020)   Warfarin maintenance plan:   12.5 mg (5 mg x 2.5) every Mon; 10 mg (5 mg x 2) all other days   Full warfarin instructions:   : 12.5 mg; Otherwise 12.5 mg every Mon; 10 mg all other days   Weekly warfarin total:   72.5 mg   Plan last modified:   Carmen Way RN (2020)   Next INR check:   10/2/2020   Priority:   Maintenance   Target end date:   Indefinite    Indications    PVD (peripheral vascular disease) with claudication (H) [I73.9]  Long term current use of anticoagulant therapy (Resolved) [Z79.01]  DVT (deep  venous thrombosis) (H) [I19.409]  History of stroke symptoms  stroke ruled out on imaging [Z86.73]             Anticoagulation Episode Summary     INR check location:       Preferred lab:       Send INR reminders to:   ANGI BHAKTA    Comments:   * lab draw due to elevated hematocrit (fingerstick meters don't work). Needs to have adjusted INR if hct over 55%. LEFT LE. STENT x 3 LEFT UPPER LEG. Previous arterial clot. AS of 10/9/19 Goal range 2.0-3.0.      Anticoagulation Care Providers     Provider Role Specialty Phone number    Kd Leong MD Responsible Family Practice [phone]         Tana Rodrigues RN, BSN, PHN

## 2020-09-21 ENCOUNTER — PATIENT OUTREACH (OUTPATIENT)
Dept: CARE COORDINATION | Facility: CLINIC | Age: 55
End: 2020-09-21

## 2020-09-21 RX ORDER — SIMVASTATIN 20 MG
TABLET ORAL
Qty: 90 TABLET | Refills: 2 | Status: SHIPPED | OUTPATIENT
Start: 2020-09-21 | End: 2021-03-03

## 2020-09-21 NOTE — PROGRESS NOTES
Clinic Care Coordination Contact    Follow Up Progress Note      Assessment: Outreach to patient. Phone goes immediately to [employer]. Left message and requested return call.     Patient has not ED or inpatient visits during this review period. She is continuing to see psychology every two weeks.    Goals addressed this encounter:   Goals Addressed                 This Visit's Progress      #2 Medical (pt-stated)   60%     Goal Statement: I want my shortness of breath to improve    Date goal set: 11/1/19    Measure of Success: When I am not short of breath    Barriers: COPD    Strengths: Motivated    Date to Achieve By: December 2020    Patient expressed understanding of goal: Yes    Action steps to achieve this goal  1. I will get my nebulizer fixed - got new machine 1/2020  2. I will use nebulizer as instructed  3. I will see PCP  as scheduled  4. I will use my asthma action plan         #3 Improve chronic symptoms (pt-stated)   60%     Goal Statement: I want my edema to improve.  Date goal set: 12/5/2019   Measure of Success: I will be able to put support stockings on by myself.  Barriers: lymphedema  Strengths: care coordination, lymphedema clinic  Date to Achieve By: December 2020  Patient expressed understanding of goal: yes    Action steps to achieve this goal  1. I will elevate my legs throughout the day  2. I will attend lymphedema clinic appointments  3. I will take my diuretics as prescribed         #4 Monitoring (pt-stated)   60%     Goal Statement: I will weigh myself every day.  Date goal set: 12/5/2019   Measure of Success: Consistent record of daily weights.  Barriers: Does not like scale  Strengths: care coordination support  Date to Achieve By: December 2020  Patient expressed understanding of goal: yes    Action steps to achieve this goal  1. I will weigh myself every morning.  2. I will write my weight down in a notebook.  3. I will take an extra 20-40 mg of Lasix if my weight increases by 3 lbs.          Intervention/Education provided during outreach: Requested return call     Outreach Frequency: monthly    Plan:   Patient will return call to RN CC    Care Coordinator will follow up in one month if no response sooner    Michela Whitaker RN, [facility] RN Coordinator  [facility]   9/21/2020    1:16 PM  [phone]

## 2020-09-25 ENCOUNTER — VIRTUAL VISIT (OUTPATIENT)
Dept: PSYCHOLOGY | Facility: CLINIC | Age: 55
End: 2020-09-25
Payer: COMMERCIAL

## 2020-09-25 DIAGNOSIS — F31.62 MODERATE MIXED BIPOLAR I DISORDER (H): Primary | ICD-10-CM

## 2020-09-25 NOTE — PROGRESS NOTES
Phone out reach to patient.  Phone goes immediately to voicemail.  Left message.  Called back 15 minutes later.  Phone still going directly to voicemail.

## 2020-10-02 ENCOUNTER — PATIENT OUTREACH (OUTPATIENT)
Dept: NURSING | Facility: CLINIC | Age: 55
End: 2020-10-02
Payer: COMMERCIAL

## 2020-10-02 ASSESSMENT — ACTIVITIES OF DAILY LIVING (ADL): DEPENDENT_IADLS:: INDEPENDENT
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
## 2020-10-05 ENCOUNTER — VIRTUAL VISIT (OUTPATIENT)
Dept: PSYCHOLOGY | Facility: CLINIC | Age: 55
End: 2020-10-05
Payer: COMMERCIAL

## 2020-10-05 DIAGNOSIS — F31.62 MODERATE MIXED BIPOLAR I DISORDER (H): Primary | ICD-10-CM

## 2020-10-05 PROCEDURE — 90832 PSYTX W PT 30 MINUTES: CPT | Mod: 95 | Performed by: PSYCHOLOGIST
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
## 2020-10-09 ENCOUNTER — ANTICOAGULATION THERAPY VISIT (OUTPATIENT)
Dept: FAMILY MEDICINE | Facility: CLINIC | Age: 55
End: 2020-10-09

## 2020-10-09 DIAGNOSIS — D75.1 ERYTHROCYTOSIS: ICD-10-CM

## 2020-10-09 DIAGNOSIS — I82.409 DVT (DEEP VENOUS THROMBOSIS) (H): ICD-10-CM

## 2020-10-09 DIAGNOSIS — Z86.73 HISTORY OF STROKE: ICD-10-CM

## 2020-10-09 DIAGNOSIS — I73.9 PVD (PERIPHERAL VASCULAR DISEASE) WITH CLAUDICATION (H): ICD-10-CM

## 2020-10-09 LAB
BASOPHILS # BLD AUTO: 0 10E9/L (ref 0–0.2)
BASOPHILS NFR BLD AUTO: 0.5 %
DIFFERENTIAL METHOD BLD: ABNORMAL
EOSINOPHIL # BLD AUTO: 0.1 10E9/L (ref 0–0.7)
EOSINOPHIL NFR BLD AUTO: 1.3 %
ERYTHROCYTE [DISTWIDTH] IN BLOOD BY AUTOMATED COUNT: 19.9 % (ref 10–15)
HCT VFR BLD AUTO: 42.2 % (ref 35–47)
HGB BLD-MCNC: 11.7 G/DL (ref 11.7–15.7)
IMM GRANULOCYTES # BLD: 0 10E9/L (ref 0–0.4)
IMM GRANULOCYTES NFR BLD: 0.3 %
INR PPP: 2.48 (ref 0.86–1.14)
LYMPHOCYTES # BLD AUTO: 1.1 10E9/L (ref 0.8–5.3)
LYMPHOCYTES NFR BLD AUTO: 18.2 %
MCH RBC QN AUTO: 23.2 PG (ref 26.5–33)
MCHC RBC AUTO-ENTMCNC: 27.7 G/DL (ref 31.5–36.5)
MCV RBC AUTO: 84 FL (ref 78–100)
MONOCYTES # BLD AUTO: 0.4 10E9/L (ref 0–1.3)
MONOCYTES NFR BLD AUTO: 6.2 %
NEUTROPHILS # BLD AUTO: 4.4 10E9/L (ref 1.6–8.3)
NEUTROPHILS NFR BLD AUTO: 73.5 %
NRBC # BLD AUTO: 0 10*3/UL
NRBC BLD AUTO-RTO: 0 /100
PLATELET # BLD AUTO: 121 10E9/L (ref 150–450)
RBC # BLD AUTO: 5.05 10E12/L (ref 3.8–5.2)
WBC # BLD AUTO: 6 10E9/L (ref 4–11)

## 2020-10-09 PROCEDURE — 85025 COMPLETE CBC W/AUTO DIFF WBC: CPT | Performed by: INTERNAL MEDICINE

## 2020-10-09 PROCEDURE — 85610 PROTHROMBIN TIME: CPT | Performed by: INTERNAL MEDICINE

## 2020-10-09 NOTE — PROGRESS NOTES
ANTICOAGULATION MANAGEMENT     Patient Name:  Bia Bill  Date:  10/9/2020    ASSESSMENT /SUBJECTIVE:    Today's INR result of 2.48 is therapeutic. Goal INR of 2.0-3.0            PLAN:    Detailed message left for Bia regarding INR result and instructed:     Warfarin Dosing Instructions: Continue your current warfarin dose 12.5 mg on  and 10 mg all other days    Instructed patient to follow up no later than: 4 weeks  Left detailed message with recommended recheck date    Education provided: None required        Instructed to call the Anticoagulation Clinic for any changes, questions or concerns. (#[phone])        Tara Rivas RN      OBJECTIVE:  Recent labs: (last 7 days)     10/09/20  0932   INR 2.48*         No question data found.  Anticoagulation Summary  As of 10/9/2020    INR goal:  2.0-3.0   TTR:  76.6 % (11.7 mo)   INR used for dosin.48 (10/9/2020)   Warfarin maintenance plan:  12.5 mg (5 mg x 2.5) every Mon; 10 mg (5 mg x 2) all other days   Full warfarin instructions:  12.5 mg every Mon; 10 mg all other days   Weekly warfarin total:  72.5 mg   Plan last modified:  Carmen Way RN (2020)   Next INR check:  2020   Priority:  Maintenance   Target end date:  Indefinite    Indications    PVD (peripheral vascular disease) with claudication (H) [I73.9]  Long term current use of anticoagulant therapy (Resolved) [Z79.01]  DVT (deep venous thrombosis) (H) [I82.409]  History of stroke symptoms  stroke ruled out on imaging [Z86.73]             Anticoagulation Episode Summary     INR check location:      Preferred lab:      Send INR reminders to:  ANGI BHAKTA    Comments:  * lab draw due to elevated hematocrit (fingerstick meters don't work). Needs to have adjusted INR if hct over 55%. LEFT LE. STENT x 3 LEFT UPPER LEG. Previous arterial clot. AS of 10/9/19 Goal range 2.0-3.0.      Anticoagulation Care Providers     Provider Role Specialty Phone number    Dang,  MD [facility] [phone]

## 2020-10-14 DIAGNOSIS — G63 POLYNEUROPATHY IN OTHER DISEASES CLASSIFIED ELSEWHERE (H): Chronic | ICD-10-CM

## 2020-10-14 RX ORDER — GABAPENTIN 600 MG/1
TABLET ORAL
Qty: 90 TABLET | Refills: 3 | Status: SHIPPED | OUTPATIENT
Start: 2020-10-14 | End: 2021-02-25

## 2020-10-14 NOTE — TELEPHONE ENCOUNTER
Routing refill request to provider for review/approval because:  Drug not on the [facility], [facility] or [facility] refill protocol or controlled substance    Aubree Richardson RN

## 2020-10-16 ENCOUNTER — PATIENT OUTREACH (OUTPATIENT)
Dept: NURSING | Facility: CLINIC | Age: 55
End: 2020-10-16
Payer: COMMERCIAL

## 2020-10-16 NOTE — PROGRESS NOTES
"Clinic Care Coordination Contact    Follow Up Progress Note      Assessment: Patient calling clinic care coordinator.     Patient states she has had a low grade temp, sneezing and sniffles.  She states she is also constipated and feels \"bloated\".    Patient states she has not taken anything for constipation. Advised patient to take a dose of Miralax and if no results repeat in the morning.      Patient states her [facility]  told her she should be tested for covid-19 due to her nasal symptoms. Patient denies increased cough, denies shortness of breath. She actually does not sound at all short of breath today.    Patient states she was exposed two weeks ago. She states she was at the pharmacy and a man came in with no mask on and walked past her. He apparently was positive for covid-19. Patient did not have contact or conversation with this person only passed in the aisle.  Reassured patient.   She states she was given a 855 number by the [facility]  to call and schedule a covid-19 test. Encouraged patient to follow the advice.     Patient states she was supposed to go to the lab this am, but because she was feeling so bloated she did not go. Reinforced the importance of keeping control of INR and labs and phlebotomy when needed.       Goals addressed this encounter: Stable- no changes  Goals Addressed                 This Visit's Progress      #1 (pt-stated)   50%     Goal Statement: I want my shortness of breath to improve    Date goal set: 11/1/19    Measure of Success: When I am not short of breath    Barriers: COPD    Strengths: Motivated    Date to Achieve By: March 2021    Patient expressed understanding of goal: Yes    Action steps to achieve this goal    1. I will use nebulizer as instructed  2. I will see PCP  as scheduled  3. I will use my asthma action plan         #3 Improve chronic symptoms (pt-stated)   70%     Goal Statement: I want my edema to improve.  Date goal set: 12/5/2019 "   Measure of Success: I will be able to put support stockings on by myself.  Barriers: lymphedema  Strengths: care coordination, lymphedema clinic  Date to Achieve By: December 2020  Patient expressed understanding of goal: yes    Action steps to achieve this goal  1. I will elevate my legs throughout the day  2. I will attend lymphedema clinic appointments  3. I will take my diuretics as prescribed         #4 Monitoring (pt-stated)   60%     Goal Statement: I will weigh myself every day.  Date goal set: 12/5/2019   Measure of Success: Consistent record of daily weights.  Barriers: Does not like scale  Strengths: care coordination support  Date to Achieve By: December 2020  Patient expressed understanding of goal: yes    Action steps to achieve this goal  1. I will weigh myself every morning.  2. I will write my weight down in a notebook.  3. I will take an extra 20-40 mg of Lasix if my weight increases by 3 lbs.         Intervention/Education provided during outreach: As above.     Outreach Frequency: monthly    Plan:   Patient will reschedule lab work.   Patient will pursue covid-19 test as instructed by [facility] .    Care Coordinator will follow up in one month.     Michela Whitaker RN, [facility] RN Coordinator  [facility]   10/16/2020    1:21 PM  [phone]  .

## 2020-10-23 ENCOUNTER — VIRTUAL VISIT (OUTPATIENT)
Dept: PSYCHOLOGY | Facility: CLINIC | Age: 55
End: 2020-10-23
Payer: COMMERCIAL

## 2020-10-23 DIAGNOSIS — F31.62 MODERATE MIXED BIPOLAR I DISORDER (H): Primary | ICD-10-CM

## 2020-10-23 PROCEDURE — 90834 PSYTX W PT 45 MINUTES: CPT | Mod: 95 | Performed by: PSYCHOLOGIST

## 2020-10-23 NOTE — PROGRESS NOTES
"                                           Progress Note  Disclaimer: Voice recognition software was used to generate this note. As a result, wrong word or 'sound-a-like' substitutions may have occurred due to the inherent limitations of voice recognition software. There may be errors in the script that have gone undetected. Please consider this when interpreting information found in this chart.     Patient Name: Bia Bill  Date: 10/23/2020         Service Type: Individual      Session Start Time: 4:00PM  session End Time: 4:45 PM  Session Length: 45    Session #: 66    Attendees: Client attended alone    Service Modality:  Phone Visit:    The patient has been notified of the following:      \"We have found that certain health care needs can be provided without the need for a face to face visit.  This service lets us provide the care you need with a phone conversation.       I will have full access to your [place] medical record during this entire phone call.   I will be taking notes for your medical record.      Since this is like an office visit, we will bill your insurance company for this service.       There are potential benefits and risks of telephone visits (e.g. limits to patient confidentiality) that differ from in-person visits.?  Confidentiality still applies for telephone services, and nobody will record the visit.  It is important to be in a quiet, private space that is free of distractions (including cell phone or other devices) during the visit.??      If during the course of the call I believe a telephone visit is not appropriate, you will not be charged for this service\"     Consent has been obtained for this service by care team member: Yes      Treatment Plan Last Reviewed: 7/24/2020  PHQ-9 / CHIN-7 : see flowsheets    DATA  Interactive Complexity: No    Crisis: No       Progress Since Last Session (Related to Symptoms / Goals / Homework):   Symptoms: No change stable    Homework: Achieved " / completed to satisfaction      Episode of Care Goals: Satisfactory progress - MAINTENANCE (Working to maintain change, with risk of relapse); Intervened by continuing to positively reinforce healthy behavior choice      Current / Ongoing Stressors and Concerns:   Longtime history of bipolar disorder. Family stress. Friend staying with her for a couple days support her through depression.        Treatment Objective(s) Addressed in This Session:   identify 2 strategies for more effectively managing Bipolar Disorder    Reinforcing positive steps taken for self care     Intervention:   CBT: behavioral activation        ASSESSMENT: Current Emotional / Mental Status (status of significant symptoms):   Risk status (Self / Other harm or suicidal ideation)   Patient denies current fears or concerns for personal safety.   Patient denies current or recent suicidal ideation or behaviors.   Patient denies current or recent homicidal ideation or behaviors.   Patient denies current or recent self injurious behavior or ideation.   Patient denies other safety concerns.   Patient reports there has been no change in risk factors since their last session.     Patient reports there has been no change in protective factors since their last session.     Recommended that patient call 911 or go to the local ED should there be a change in any of these risk factors.     Appearance:   Unable to assess    Eye Contact:   Unable to assess    Psychomotor Behavior: Unable to assess    Attitude:   Cooperative    Orientation:   All   Speech    Rate / Production: Normal     Volume:  Normal    Mood:    Normal   Affect:    Appropriate    Thought Content:  Clear    Thought Form:  Coherent  Logical    Insight:    Good      Medication Review:   No changes to current psychiatric medication(s)     Medication Compliance:   Yes     Changes in Health Issues:   None reported     Chemical Use Review:   Substance Use: Chemical use reviewed, no active concerns  identified      Tobacco Use: No change in amount of tobacco use since last session.  Provided encouragement to quit     Diagnosis:  1. Moderate mixed bipolar I disorder (H)        Collateral Reports Completed:   Not Applicable    PLAN: (Patient Tasks / Therapist Tasks / Other)  Client to maintain boundaries with family.        Kd CAMMYDominique Morris                                                         ______________________________________________________________________       Treatment Plan     Client's Name: Bia Bill               YOB: 1965     Date: 4/28/2020        DSM5 Diagnoses: (Sustained by DSM5 Criteria Listed Above)  Diagnoses: 296.40 Bipolar I Disorder Current or Most Recent Episode Manic, Unspecified  Psychosocial & Contextual Factors: financial difficulties, chronic pain, roommate issues  WHODAS 2.0 (12 item)               This questionnaire asks about difficulties due to health conditions. Health conditions             include disease or illnesses, other health problems that may be short or long lasting,             injuries, mental health or emotional problems, and problems with alcohol or drugs.                         Think back over the past 30 days and answer these questions, thinking about how much             difficulty you had doing the following activities. For each question, please [place] only             one response.      S1  Standing for long periods such as 30 minutes?  Mild =           2    S2  Taking care of household responsibilities?  Moderate =   3    S3  Learning a new task, for example, learning how to get to a new place?  Mild =           2    S4  How much of a problem do you have joining community activities (for example, festivals, Temple or other activities) in the same way as anyone else can?  Moderate =   3    S5  How much have you been emotionally affected by your health problems?  Mild =           2             In the past 30 days, how much difficulty  did you have in:    S6  Concentrating on doing something for ten minutes?  Mild =           2    S7  Walking a long distance such as a kilometer (or equivalent)?  Severe =       4    S8  Washing your whole body?  None =         1    S9  Getting dressed?  None =         1    S10  Dealing with people you do not know?  Moderate =   3    S11  Maintaining a friendship?  Severe =       4    S12  Your day to day work?  Moderate =   3       H1  Overall, in the past 30 days, how many days were these difficulties present?  Record number of days seven    H2  In the past 30 days, for how many days were you totally unable to carry out your usual activities or work because of any health condition?  Record number of days  seven    H3  In the past 30 days, not counting the days that you were totally unable, for how many days did you cut back or reduce your usual activities or work because of any health condition?  Record number of days five                      Referral / Collaboration:  Referral to another professional/service is not indicated at this time..     Anticipated number of session or this episode of care: 60     Goals  1. Education- the Biopsychosocial model of depression  a. Client will be able to describe how depression is effecting them physically, emotionally and socially  2. Behavioral Activation  a. Client will learn to assess their depression on a day to day basis  b. Client will Identify two forms of exercise/activity and engage in them 3 times per week  c. Client will Identify 3 things that make them laugh, and engage in these 5 times per week.  d. Client will Identify 1-2Creative activities or hobbies  and engage in them 2 times per week  e. Client will identify music, movies, books that make them feel good and use them 3-4 times per week  3. Self-care  a. Client will identify 5 things they can do just for themselves  b. Client will take time for quiet, reflection, meditation 5 times per week  c. Client will  Learn to set boundaries when appropriate  d. Client will Identify 2 individuals they can call on for support, distraction  4. Assessment of progress  a. Client will engage in assessment of their progress on a regular basis     Bipolar Disorder  Treatment plan:  1. Education- the Biopsychosocial model of Bipolar Disorder    a. Client will be able to describe in general terms what Bipolar Disorder  is and is not  b. Client will be able to describe how Bipolar Disorder  has affected their life in at least two different areas, such as school or work and Home/relationships  c. Clients parents/guardians or significant others will be provided information on what Bipolar Disorder  is and the ways it can affect relationships and be encouraged to be a part of clients treatment team.  2. Medication  a. Client will participate in medication evaluation for Bipolar Disorder  symptoms and follow medication recommendations.   3. Identification and management of triggers  a. Client and therapist will examine patient s history to determine if there are predictable triggers for manic or depressive episodes (e.g. boredom, anger, family stress)  b. Client and therapist will develop means of diffusing these triggers (e.g. relaxation strategies, boundary setting, anger management)  4. Comorbid conditions   a. Client and therapist will asses for comorbid conditions (e.g. anxiety, depression, substance use). and add additional items to treatment plan as needed  5. Self-care  a. Client will identify 3 things they can do just for themselves  b. Client will take time for quiet, reflection, meditation 3 times per week  c. Client will Learn to set boundaries when appropriate  d. Client will Identify 2 individuals they can call on for support, distraction  5. Behavioral Activation  a. Client will Identify two forms of exercise/activity and engage in them 3 times per week  b. Client will Identify 2 things that make them laugh, and engage in these  3 times per week.  c. Client will Identify 2 Creative activities or hobbies  and engage in them 2 times per week  d. Client will identify music, movies, books that make them feel good and use them 3 times per week  6. Assessment of progress  a. Client will engage in assessment of their progress on a regular basis     Client has reviewed and agreed to the above plan.        Kd Morris             7/24/2020

## 2020-10-27 ENCOUNTER — PATIENT OUTREACH (OUTPATIENT)
Dept: NURSING | Facility: CLINIC | Age: 55
End: 2020-10-27
Payer: COMMERCIAL

## 2020-10-27 NOTE — PROGRESS NOTES
"Clinic Care Coordination Contact    Follow Up Progress Note      Assessment: Patient calling RN clinic care coordinator.    Patient states \"can women get hernia's?\" Verified that, yes women can get hernia's.    Patient states that her stomach is hard and red and painful to the touch. She states this is on the left side of her belly button on the lower stomach.  She states it is warm to the touch and it hurts when touched.   Patient denies any blisters or open areas. No raised area, no bulging.     Patient denies any trauma or injury to the area.  Denies fever, diarrhea or nausea. She does complain of fatigue.     Advised patient be seen to determine etiology.   Patient states she does not have a ride. Her \"friend\" is with her, but he doesn't have enough gas, but does get paid in the morning. Patient feels she can wait to be seen until morning.     Patient states she owes $700 for rent and gas. Discussed calling her county worker. She states she gets $100 cash and $100 for food stamps.     Goals addressed this encounter: Stable  Goals Addressed                 This Visit's Progress      #1 (pt-stated)   60%     Goal Statement: I want my shortness of breath to improve    Date goal set: 11/1/19    Measure of Success: When I am not short of breath    Barriers: COPD    Strengths: Motivated    Date to Achieve By: March 2021    Patient expressed understanding of goal: Yes    Action steps to achieve this goal    1. I will use nebulizer as instructed  2. I will see PCP  as scheduled  3. I will use my asthma action plan         #3 Improve chronic symptoms (pt-stated)   70%     Goal Statement: I want my edema to improve.  Date goal set: 12/5/2019   Measure of Success: I will be able to put support stockings on by myself.  Barriers: lymphedema  Strengths: care coordination, lymphedema clinic  Date to Achieve By: December 2020  Patient expressed understanding of goal: yes    Action steps to achieve this goal  1. I will elevate " my legs throughout the day  2. I will attend lymphedema clinic appointments  3. I will take my diuretics as prescribed         #4 Monitoring (pt-stated)   60%     Goal Statement: I will weigh myself every day.  Date goal set: 12/5/2019   Measure of Success: Consistent record of daily weights.  Barriers: Does not like scale  Strengths: care coordination support  Date to Achieve By: December 2020  Patient expressed understanding of goal: yes    Action steps to achieve this goal  1. I will weigh myself every morning.  2. I will write my weight down in a notebook.  3. I will take an extra 20-40 mg of Lasix if my weight increases by 3 lbs.         Intervention/Education provided during outreach: Advised patient be seen in clinic or urgent care     Outreach Frequency: monthly    Plan:   Patient will follow up with medical provider.    Care Coordinator will follow up in one month.    Michela Whitaker RN, [facility] RN Coordinator  [facility]   10/27/2020    3:31 PM  [phone]

## 2020-10-29 ENCOUNTER — OFFICE VISIT (OUTPATIENT)
Dept: FAMILY MEDICINE | Facility: CLINIC | Age: 55
End: 2020-10-29
Payer: COMMERCIAL

## 2020-10-29 VITALS
HEIGHT: 59 IN | TEMPERATURE: 97.2 F | RESPIRATION RATE: 18 BRPM | HEART RATE: 70 BPM | DIASTOLIC BLOOD PRESSURE: 60 MMHG | WEIGHT: 242 LBS | SYSTOLIC BLOOD PRESSURE: 120 MMHG | BODY MASS INDEX: 48.79 KG/M2

## 2020-10-29 DIAGNOSIS — R10.9 ABDOMINAL PAIN, UNSPECIFIED ABDOMINAL LOCATION: Primary | ICD-10-CM

## 2020-10-29 LAB
ALBUMIN SERPL-MCNC: 3.1 G/DL (ref 3.4–5)
ALP SERPL-CCNC: 114 U/L (ref 40–150)
ALT SERPL W P-5'-P-CCNC: 15 U/L (ref 0–50)
ANION GAP SERPL CALCULATED.3IONS-SCNC: 1 MMOL/L (ref 3–14)
AST SERPL W P-5'-P-CCNC: 9 U/L (ref 0–45)
BILIRUB SERPL-MCNC: 0.3 MG/DL (ref 0.2–1.3)
BUN SERPL-MCNC: 12 MG/DL (ref 7–30)
CALCIUM SERPL-MCNC: 9.2 MG/DL (ref 8.5–10.1)
CHLORIDE SERPL-SCNC: 103 MMOL/L (ref 94–109)
CO2 SERPL-SCNC: 36 MMOL/L (ref 20–32)
CREAT SERPL-MCNC: 0.69 MG/DL (ref 0.52–1.04)
ERYTHROCYTE [DISTWIDTH] IN BLOOD BY AUTOMATED COUNT: 20.7 % (ref 10–15)
GFR SERPL CREATININE-BSD FRML MDRD: >90 ML/MIN/{1.73_M2}
GLUCOSE SERPL-MCNC: 114 MG/DL (ref 70–99)
HCT VFR BLD AUTO: 41.5 % (ref 35–47)
HGB BLD-MCNC: 11.4 G/DL (ref 11.7–15.7)
LIPASE SERPL-CCNC: 75 U/L (ref 73–393)
MCH RBC QN AUTO: 22.5 PG (ref 26.5–33)
MCHC RBC AUTO-ENTMCNC: 27.5 G/DL (ref 31.5–36.5)
MCV RBC AUTO: 82 FL (ref 78–100)
PLATELET # BLD AUTO: 114 10E9/L (ref 150–450)
POTASSIUM SERPL-SCNC: 4 MMOL/L (ref 3.4–5.3)
PROT SERPL-MCNC: 5.9 G/DL (ref 6.8–8.8)
RBC # BLD AUTO: 5.06 10E12/L (ref 3.8–5.2)
SODIUM SERPL-SCNC: 140 MMOL/L (ref 133–144)
WBC # BLD AUTO: 5 10E9/L (ref 4–11)

## 2020-10-29 PROCEDURE — 80053 COMPREHEN METABOLIC PANEL: CPT | Performed by: FAMILY MEDICINE

## 2020-10-29 PROCEDURE — 83690 ASSAY OF LIPASE: CPT | Performed by: FAMILY MEDICINE

## 2020-10-29 PROCEDURE — 85027 COMPLETE CBC AUTOMATED: CPT | Performed by: FAMILY MEDICINE

## 2020-10-29 PROCEDURE — 99214 OFFICE O/P EST MOD 30 MIN: CPT | Performed by: FAMILY MEDICINE

## 2020-10-29 RX ORDER — CEPHALEXIN 500 MG/1
500 CAPSULE ORAL 4 TIMES DAILY
Qty: 28 CAPSULE | Refills: 0 | Status: SHIPPED | OUTPATIENT
Start: 2020-10-29 | End: 2020-11-05

## 2020-10-29 ASSESSMENT — MIFFLIN-ST. JEOR: SCORE: 1600.39

## 2020-10-29 NOTE — NURSING NOTE
"Chief Complaint   Patient presents with     Hernia     /60 (Cuff Size: Adult Regular)   Pulse 70   Temp 97.2  F (36.2  C) (Tympanic)   Resp 18   Ht 1.486 m (4' 10.5\")   Wt 109.8 kg (242 lb)   LMP 09/27/2009   Breastfeeding No   BMI 49.72 kg/m   Estimated body mass index is 49.72 kg/m  as calculated from the following:    Height as of this encounter: 1.486 m (4' 10.5\").    Weight as of this encounter: 109.8 kg (242 lb).  Patient presents to the clinic using No DME      Health Maintenance that is potentially due pending provider review:    Health Maintenance Due   Topic Date Due     DIABETIC FOOT EXAM  1965     HEPATITIS C SCREENING  12/03/1984     HEPATITIS B IMMUNIZATION (1 of 3 - Risk 3-dose series) 12/03/1985     ZOSTER IMMUNIZATION (1 of 2) 12/03/2016     MAMMO SCREENING  03/24/2019     INFLUENZA VACCINE (1) 09/01/2020     A1C  09/22/2020     EYE EXAM  11/15/2020                "

## 2020-10-29 NOTE — PROGRESS NOTES
SUBJECTIVE   Bia Bill is a 54 year old female who presents with     Concern - Hernia?  Onset: about a week   Description: will get a buldge in her stomach-mainly on the right side but the buldge seems to move from one side to the other. Painful at times. If she walks to far it will start to hurt as well.   Intensity: moderate  Progression of Symptoms:  worsening  Therapies tried and outcome:  none  No fever, chills, urinary difficulty or other relevant systemic symptoms.      PCP   Gina Summers [phone]    Health Maintenance        Health Maintenance Due   Topic Date Due     DIABETIC FOOT EXAM  1966     HEPATITIS C SCREENING  12/03/1984     HEPATITIS B IMMUNIZATION (1 of 3 - Risk 3-dose series) 12/03/1985     ZOSTER IMMUNIZATION (1 of 2) 12/03/2016     MAMMO SCREENING  03/24/2019     INFLUENZA VACCINE (1) 09/01/2020     A1C  09/22/2020     EYE EXAM  11/15/2020       HPI        Patient Active Problem List   Diagnosis     Mild persistent asthma     Polyneuropathy in other diseases classified elsewhere (H)     DVT (deep venous thrombosis) (H)     Alcohol abuse, in remission     SECONDARY POLYCYTHEMIA     Chondromalacia of patella     Developmental reading disorder     Esophageal reflux     Hypothyroidism     Fatty liver     GILES (Obstructive Sleep Apnea)-Moderate (AHI 16)     RLS (restless legs syndrome)     Smoker     Erythrocytosis     Moderate mixed bipolar I disorder (H)     Personality disorder, depressive     COPD (chronic obstructive pulmonary disease) (H)     Rosacea     Health Care Home     DDD (degenerative disc disease), cervical     Benign neoplasm of colon (POLYPOSIS)     History of stroke symptoms, stroke ruled out on imaging     Somatization disorder     Sebaceous cyst of right axilla     HTN, goal below 140/90     Left leg pain     Vitamin D deficiency     Morbid obesity (H)     PVD (peripheral vascular disease) with claudication (H)     Cyst of left ovary     Morbid obesity  with alveolar hypoventilation (H)     Severe episode of recurrent major depressive disorder (H)     Conductive hearing loss of left ear with restricted hearing of right ear     Sensorineural hearing loss (SNHL) of right ear with restricted hearing of left ear     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     COPD exacerbation (H)     Lymphedema of both lower extremities     Type 2 diabetes mellitus (H)     Bilateral edema of lower extremity     Dyspnea     Thrombocytopenia (H)     Current Outpatient Medications   Medication     acetaminophen (TYLENOL) 500 MG tablet     ADVAIR -21 MCG/ACT inhaler     albuterol (PROAIR HFA/PROVENTIL HFA/VENTOLIN HFA) 108 (90 Base) MCG/ACT inhaler     albuterol (PROVENTIL) (2.5 MG/3ML) 0.083% neb solution     alcohol swab prep pads     allopurinol (ZYLOPRIM) 300 MG tablet     ARIPiprazole (ABILIFY) 10 MG tablet     blood glucose (NO BRAND SPECIFIED) test strip     blood glucose calibration (NO BRAND SPECIFIED) solution     blood glucose monitoring (NO BRAND SPECIFIED) meter device kit     buPROPion (WELLBUTRIN XL) 150 MG 24 hr tablet     EPINEPHrine (ANY BX GENERIC EQUIV) 0.3 MG/0.3ML injection 2-pack     furosemide (LASIX) 40 MG tablet     gabapentin (NEURONTIN) 300 MG capsule     gabapentin (NEURONTIN) 600 MG tablet     ipratropium - albuterol 0.5 mg/2.5 mg/3 mL (DUONEB) 0.5-2.5 (3) MG/3ML neb solution     lamoTRIgine (LAMICTAL) 100 MG tablet     levothyroxine (SYNTHROID/LEVOTHROID) 175 MCG tablet     metFORMIN (GLUCOPHAGE) 1000 MG tablet     mometasone-formoterol (DULERA) 200-5 MCG/ACT oral inhaler     omeprazole (PRILOSEC) 20 MG DR capsule     order for DME     order for DME     order for DME     order for DME     order for DME     potassium chloride ER (KLOR-CON M) 10 MEQ CR tablet     pramipexole (MIRAPEX) 0.125 MG tablet     simvastatin (ZOCOR) 20 MG tablet     thin (NO BRAND SPECIFIED) lancets     traZODone (DESYREL) 150 MG  tablet     trimethoprim-polymyxin b (POLYTRIM) 15641-8.1 UNIT/ML-% ophthalmic solution     VITAMIN D3 25 MCG (1000 UT) tablet     warfarin ANTICOAGULANT (JANTOVEN ANTICOAGULANT) 5 MG tablet     No current facility-administered medications for this visit.        Patient Active Problem List   Diagnosis     Mild persistent asthma     Polyneuropathy in other diseases classified elsewhere (H)     DVT (deep venous thrombosis) (H)     Alcohol abuse, in remission     SECONDARY POLYCYTHEMIA     Chondromalacia of patella     Developmental reading disorder     Esophageal reflux     Hypothyroidism     Fatty liver     GILES (Obstructive Sleep Apnea)-Moderate (AHI 16)     RLS (restless legs syndrome)     Smoker     Erythrocytosis     Moderate mixed bipolar I disorder (H)     Personality disorder, depressive     COPD (chronic obstructive pulmonary disease) (H)     [facility]     DDD (degenerative disc disease), cervical     Benign neoplasm of colon (POLYPOSIS)     History of stroke symptoms, stroke ruled out on imaging     Somatization disorder     Sebaceous cyst of right axilla     HTN, goal below 140/90     Left leg pain     Vitamin D deficiency     Morbid obesity (H)     PVD (peripheral vascular disease) with claudication (H)     Cyst of left ovary     Morbid obesity with alveolar hypoventilation (H)     Severe episode of recurrent major depressive disorder (H)     Conductive hearing loss of left ear with restricted hearing of right ear     Sensorineural hearing loss (SNHL) of right ear with restricted hearing of left ear     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     COPD exacerbation (H)     Lymphedema of both lower extremities     Type 2 diabetes mellitus (H)     Bilateral edema of lower extremity     Dyspnea     Thrombocytopenia (H)     Past Surgical History:   Procedure Laterality Date     BONE MARROW BIOPSY, BONE SPECIMEN, NEEDLE/TROCAR N/A 11/17/2014     Procedure: BIOPSY BONE MARROW;  Surgeon: Hay aAron MD;  Location: [facility]     D & C  10/26/09    with uterine ablation     ENDOVASCULAR PLACEMENT VASCULAR DEVICE Left     Left leg stent x 3     ESOPHAGOSCOPY, GASTROSCOPY, DUODENOSCOPY (EGD), COMBINED  4/21/2014    Procedure: Gastroscopy;  Surgeon: Moris Thomas MD;  Location: [facility]     HYSTERECTOMY, PAP NO LONGER INDICATED  1-4-2010     LAPAROSCOPIC CHOLECYSTECTOMY N/A 1/4/2019    Procedure: Laparoscopic cholecystectomy;  Surgeon: Aaron Siegel MD;  Location: [facility]     LITHOTRIPSY  2004    Lithotrypsy       Social History     Tobacco Use     Smoking status: Current Every Day Smoker     Packs/day: 0.50     Years: 43.00     Pack years: 21.50     Types: Cigarettes     Smokeless tobacco: Never Used     Tobacco comment: started at age 11.  Quit during pregnancyX4.  Quit 3 months ago.    Substance Use Topics     Alcohol use: No     Alcohol/week: 0.0 standard drinks     Comment: quit 1995     Family History   Problem Relation Age of Onset     Hypertension Mother      Diabetes Mother      Blood Disease Mother      Heart Disease Mother         chf     Cerebrovascular Disease Mother      Hypertension Father      Cancer Father         lung cancer     Allergies Sister      Diabetes Sister      Depression Sister      Hypertension Sister          Current Outpatient Medications   Medication Sig Dispense Refill     acetaminophen (TYLENOL) 500 MG tablet Take 500-1,000 mg by mouth every 6 hours as needed for mild pain       ADVAIR -21 MCG/ACT inhaler INHALE TWO PUFFS BY MOUTH TWICE A DAY 12 g 3     albuterol (PROAIR HFA/PROVENTIL HFA/VENTOLIN HFA) 108 (90 Base) MCG/ACT inhaler INHALE TWO PUFFS BY MOUTH EVERY 6 HOURS AS NEEDED FOR SHORTNESS OF BREATH DIFFICULTY BREATHING OR WHEEZING 18 g 0     albuterol (PROVENTIL) (2.5 MG/3ML) 0.083% neb solution Take 1 vial (2.5 mg) by nebulization every 6 hours as needed for shortness of breath / dyspnea or wheezing 1  Box 0     alcohol swab prep pads Use to swab area of injection/mike as directed. 100 each 3     allopurinol (ZYLOPRIM) 300 MG tablet TAKE ONE TABLET BY MOUTH ONCE DAILY 90 tablet 1     ARIPiprazole (ABILIFY) 10 MG tablet TAKE ONE TABLET BY MOUTH ONCE DAILY 90 tablet 1     blood glucose (NO BRAND SPECIFIED) test strip Use to test blood sugar 2-3x/day. To accompany: Blood Glucose Monitor Brands: per insurance. 100 strip 11     blood glucose calibration (NO BRAND SPECIFIED) solution To accompany: Blood Glucose Monitor Brands: per insurance. 1 Bottle 3     blood glucose monitoring (NO BRAND SPECIFIED) meter device kit Use to test blood sugar daily or as directed. Preferred blood glucose meter OR supplies to accompany: Blood Glucose Monitor Brands: per insurance. 1 kit 0     buPROPion (WELLBUTRIN XL) 150 MG 24 hr tablet TAKE 1 TABLET BY MOUTH EVERY EVENING 150 tablet 0     EPINEPHrine (ANY BX GENERIC EQUIV) 0.3 MG/0.3ML injection 2-pack Inject 0.3 mLs (0.3 mg) into the muscle once as needed for anaphylaxis 0.6 mL 0     furosemide (LASIX) 40 MG tablet Take 1 tablet (40 mg) by mouth 2 times daily 180 tablet 4     gabapentin (NEURONTIN) 300 MG capsule Take 1 capsule (300 mg) by mouth At Bedtime TAKE ONE CAPSULE BY MOUTH ONCE DAILY AT BEDTIME WITH A 600 MG TABLET FOR A TOTAL DOSE  MG 90 capsule 3     gabapentin (NEURONTIN) 600 MG tablet TAKE ONE TABLET BY MOUTH THREE TIMES A DAY 90 tablet 3     ipratropium - albuterol 0.5 mg/2.5 mg/3 mL (DUONEB) 0.5-2.5 (3) MG/3ML neb solution Take 1 vial (3 mLs) by nebulization every 6 hours as needed for shortness of breath / dyspnea or wheezing 30 vial 11     lamoTRIgine (LAMICTAL) 100 MG tablet TAKE ONE TABLET BY MOUTH ONCE DAILY 90 tablet 1     levothyroxine (SYNTHROID/LEVOTHROID) 175 MCG tablet TAKE ONE TABLET BY MOUTH ONCE DAILY 90 tablet 0     metFORMIN (GLUCOPHAGE) 1000 MG tablet TAKE ONE TABLET BY MOUTH TWICE A DAY WITH MEALS 180 tablet 0     mometasone-formoterol (DULERA)  200-5 MCG/ACT oral inhaler Inhale 2 puffs into the lungs 2 times daily 13 g 1     omeprazole (PRILOSEC) 20 MG DR capsule Take 1 capsule (20 mg) by mouth 2 times daily 60 capsule 11     order for DME Equipment being ordered:x2 [employer] 30/40mmHg compression wraps with x2 extra prs of compression liners 1 each 0     order for DME Equipment being ordered: Nebulizer 1 Device 0     order for DME Equipment being ordered: CPAP  AIRSENSE 10  5-18 CM H20  # [id number]   DN# [id number]       order for DME Equipment being ordered: DEPENDS SIZE LARGE 60 Month 5     order for DME Equipment being ordered: INCONTINENCE PADS   QID 1 Month 11     potassium chloride ER (KLOR-CON M) 10 MEQ CR tablet TAKE ONE TABLET BY MOUTH ONCE DAILY 90 tablet 2     pramipexole (MIRAPEX) 0.125 MG tablet Take 1-20 tablets (0.125-2.5 mg) by mouth At Bedtime TAKE 1-2 TABLETS BY MOUTH AT BEDTIME 180 tablet 1     simvastatin (ZOCOR) 20 MG tablet TAKE ONE TABLET BY MOUTH EVERY NIGHT AT BEDTIME 90 tablet 2     thin (NO BRAND SPECIFIED) lancets Use with lanceting device. To accompany: Blood Glucose Monitor Brands: per insurance. 1 each 6     traZODone (DESYREL) 150 MG tablet TAKE ONE TABLET BY MOUTH EVERY NIGHT AT BEDTIME 90 tablet 1     trimethoprim-polymyxin b (POLYTRIM) 44415-7.1 UNIT/ML-% ophthalmic solution Place 1-2 drops Into the left eye every 6 hours 10 mL 0     VITAMIN D3 25 MCG (1000 UT) tablet TAKE ONE TABLET BY MOUTH ONCE DAILY 100 tablet 2     warfarin ANTICOAGULANT (JANTOVEN ANTICOAGULANT) 5 MG tablet TAKE TWO AND ONE-HALF TABLETS BY MOUTH EVERY MONDAY, TWO TABLETS ON ALL OTHER DAYS OR AS DIRECTED BY ANTICOAGULATION CLINIC 157 tablet 1     Allergies   Allergen Reactions     Darvocet [Propoxyphene N-Apap] Anaphylaxis     Darvocet,Percocet      Percocet [Oxycodone-Acetaminophen] Anaphylaxis, Hives and Swelling     Has tolerated hydromorphone in the past.      Asa [Aspirin] Hives     Aspirin causes seizures and hives, throat swelling.   Has tolerated  "ketorolac in the past.        Bee      Ibuprofen Swelling     Throat swelling per patient      Lyrica [Pregabalin] Other (See Comments) and Swelling     dizziness     Povidone Iodine Rash     Reaction to topical betadine     Tape [Adhesive Tape] Rash     Recent Labs   Lab Test 06/22/20  0950 06/19/20  0917 01/23/20  0444 01/22/20  2056 12/10/19  1205 12/10/19  1205 10/29/19  0822 10/29/19  0822 09/27/19  1309 09/27/19  1309 04/06/18  1316 04/06/18  1316   A1C 6.2*  --  7.2*  --   --   --   --  6.1*  --   --   --   --    LDL 36  --   --   --   --   --   --   --   --  48  --  101*   HDL 32*  --   --   --   --   --   --   --   --  31*  --  46*   TRIG 213*  --   --   --   --   --   --   --   --  173*  --  198*   ALT 18  --   --  16  --  27   < > 42   < >  --    < >  --    CR 0.71  --  0.66 0.82   < > 0.72   < > 0.69   < >  --    < >  --    GFRESTIMATED >90  --  >90 81   < > >90   < > >90   < >  --    < >  --    GFRESTBLACK >90  --  >90 >90   < > >90   < > >90   < >  --    < >  --    POTASSIUM 3.9  --  3.7 3.2*   < > 2.9*   < > 4.1   < >  --    < >  --    TSH 3.34 4.00  --   --   --   --    < >  --   --  6.91*  --  1.07    < > = values in this interval not displayed.      BP Readings from Last 3 Encounters:   10/29/20 120/60   08/21/20 119/62   07/30/20 105/41    Wt Readings from Last 3 Encounters:   10/29/20 109.8 kg (242 lb)   06/22/20 100.1 kg (220 lb 9.6 oz)   01/30/20 105.1 kg (231 lb 12.8 oz)                    Reviewed and updated:  Tobacco  Allergies  Meds  Med Hx  Surg Hx  Fam Hx  Soc Hx     ROS:  Constitutional, neuro, ENT, endocrine, pulmonary, cardiac, gastrointestinal, genitourinary, musculoskeletal, integument and psychiatric systems are negative, except as otherwise noted.    PHYSICAL EXAM   /60 (Cuff Size: Adult Regular)   Pulse 70   Temp 97.2  F (36.2  C) (Tympanic)   Resp 18   Ht 1.486 m (4' 10.5\")   Wt 109.8 kg (242 lb)   LMP 09/27/2009   Breastfeeding No   BMI 49.72 kg/m    Body " mass index is 49.72 kg/m .  GENERAL: alert, no distress and obese  EYES: Eyes grossly normal to inspection, PERRL and conjunctivae and sclerae normal  NECK: no adenopathy, no asymmetry, masses, or scars and thyroid normal to palpation  RESP: lungs clear to auscultation - no rales, rhonchi or wheezes  CV: regular rate and rhythm, normal S1 S2, no S3 or S4, no murmur, click or rub, no peripheral edema and peripheral pulses strong  ABDOMEN: soft, mildly tender abdomen, skin erythematous, warmth on palpation, bowel sounds normal  MS: no gross musculoskeletal defects noted, no edema  NEURO: Normal strength and tone, mentation intact and speech normal  PSYCH: mentation appears normal, affect normal/bright            Assessment & Plan     Abdominal pain, unspecified abdominal location  --54-year-old female presented with abdominal pain which started about a week ago, associated with some bloating.  Physical examination remarkable for erythematous abdominal wall skin, mildly tender and warmth on palpation.  History of cholecystectomy.  Differentials discussed in detail including but not limited to cellulitis, pancreatitis, panniculitis.  CBC, CMP, lipase and CT abdomen/pelvis ordered for further evaluation.  Cephalexin prescribed for suspected abdominal wall cellulitis, common side effects discussed.  Suggested to continue well hydration, over-the-counter analgesia.  Instructed to go ER if symptoms worsen otherwise follow-up with PCP early next week.  Patient understood and in agreement with above plan.  All questions answered.  - CBC with platelets  - Comprehensive metabolic panel (BMP + Alb, Alk Phos, ALT, AST, Total. Bili, TP)  - CT Abdomen Pelvis w Contrast; Future  - Lipase  - cephALEXin (KEFLEX) 500 MG capsule; Take 1 capsule (500 mg) by mouth 4 times daily for 7 days         Will Smith MD  [facility]

## 2020-11-03 ENCOUNTER — HOSPITAL ENCOUNTER (OUTPATIENT)
Dept: CT IMAGING | Facility: CLINIC | Age: 55
Discharge: HOME | End: 2020-11-03
Attending: FAMILY MEDICINE | Admitting: FAMILY MEDICINE
Payer: COMMERCIAL

## 2020-11-03 DIAGNOSIS — R10.9 ABDOMINAL PAIN, UNSPECIFIED ABDOMINAL LOCATION: ICD-10-CM

## 2020-11-03 PROCEDURE — 250N000009 HC RX 250: Performed by: FAMILY MEDICINE

## 2020-11-03 PROCEDURE — 250N000011 HC RX IP 250 OP 636: Performed by: FAMILY MEDICINE

## 2020-11-03 PROCEDURE — 74177 CT ABD & PELVIS W/CONTRAST: CPT

## 2020-11-03 RX ORDER — IOPAMIDOL 755 MG/ML
100 INJECTION, SOLUTION INTRAVASCULAR ONCE
Status: COMPLETED | OUTPATIENT
Start: 2020-11-03 | End: 2020-11-03

## 2020-11-03 RX ADMIN — SODIUM CHLORIDE 70 ML: 9 INJECTION, SOLUTION INTRAVENOUS at 13:18

## 2020-11-03 RX ADMIN — IOPAMIDOL 100 ML: 755 INJECTION, SOLUTION INTRAVENOUS at 13:18

## 2020-11-04 ENCOUNTER — PATIENT OUTREACH (OUTPATIENT)
Dept: NURSING | Facility: CLINIC | Age: 55
End: 2020-11-04
Payer: COMMERCIAL

## 2020-11-04 NOTE — PROGRESS NOTES
"Clinic Care Coordination Contact    Follow Up Progress Note      Assessment: Patient calling RN clinic care coordinator.    Patient states she had scan of her abdomen and wants to review.  She is concerned she has a spot on her spleen.     Patient had CT of abdomen and pelvis. Results:     IMPRESSION:   1.  No acute intra-abdominal or intrapelvic findings. No evidence of a  bowel obstruction or inflammatory process.  2.  Diffuse body wall edema.  3.  Stable splenomegaly.  Goals addressed this encounter:     Reassured patient she does not have any spots on her spleen.     Patient was seen due to abdomen being red and inflamed. Patient was started on Keflex for cellulitis of abdomen.     Patient states that she thinks her stomach looks more red, is very warm and tender to the touch. She states she feels bloated and is not able to eat very much because she feels \"stuffed\".     Patient denies nausea or vomiting. She does not feel she is constipated, she is having normal BM's.     Patient states her blood sugars are stable at .  She confirms she is drinking.  She denies she has had any low's.     Patient states she liked Dr. Will Smith at [place]. She felt he was concerned about her welfare. Advised patient to schedule follow up appointment with Dr. Smith.  She agrees      Goals are stable  Goals Addressed                 This Visit's Progress      #1 (pt-stated)   50%     Goal Statement: I want my shortness of breath to improve    Date goal set: 11/1/19    Measure of Success: When I am not short of breath    Barriers: COPD    Strengths: Motivated    Date to Achieve By: March 2021    Patient expressed understanding of goal: Yes    Action steps to achieve this goal    1. I will use nebulizer as instructed  2. I will see PCP  as scheduled  3. I will use my asthma action plan         #3 Improve chronic symptoms (pt-stated)   70%     Goal Statement: I want my edema to improve.  Date goal set: 12/5/2019 "   Measure of Success: I will be able to put support stockings on by myself.  Barriers: lymphedema  Strengths: care coordination, lymphedema clinic  Date to Achieve By: December 2020  Patient expressed understanding of goal: yes    Action steps to achieve this goal  1. I will elevate my legs throughout the day  2. I will attend lymphedema clinic appointments  3. I will take my diuretics as prescribed         #4 Monitoring (pt-stated)   50%     Goal Statement: I will weigh myself every day.  Date goal set: 12/5/2019   Measure of Success: Consistent record of daily weights.  Barriers: Does not like scale  Strengths: care coordination support  Date to Achieve By: December 2020  Patient expressed understanding of goal: yes    Action steps to achieve this goal  1. I will weigh myself every morning.  2. I will write my weight down in a notebook.  3. I will take an extra 20-40 mg of Lasix if my weight increases by 3 lbs.            Intervention/Education provided during outreach: As above.  Reassurance     Outreach Frequency: monthly    Plan:   Patient will schedule follow up appointment with Dr. Smith in [place].    Care Coordinator will follow up in one month.    Michela Whitaker RN, [facility] RN Coordinator  [facility]   11/4/2020    1:00 PM  [phone]

## 2020-11-04 NOTE — LETTER
[facility]  Complex Care Plan  About Me:    Patient Name:  Bia Bill    YOB: 1965  Age:         54 year old   [facility] MRN:    [id number] Telephone Information:  Home Phone [phone]   Mobile [phone]       Address:  [address] [address] [place] [address] Email address:  No e-mail address on record      Emergency Contact(s)    Name Relationship Lgl Grd Work Phone Home Phone Mobile Phone   1. GREGORY CARLSON Significant ot*  none [phone]   2. MAE MUSTAFA Daughter  [phone]           Primary language:  English     needed? No   [facility] [employer]:  [phone] op. 1  Other communication barriers: None  Preferred Method of Communication:  Mail  Current living arrangement:    Mobility Status/ Medical Equipment:      Health Maintenance  Health Maintenance Reviewed:      My Access Plan  Medical Emergency 911   Primary Clinic Line [facility] [phone]   24 Hour Appointment Line [phone] or  [phone] ([phone]) (toll-free)   24 Hour Nurse Line [phone] (toll-free)   Preferred Urgent Care     Preferred Hospital     Preferred Pharmacy [place], [place] - [facility]     Behavioral Health Crisis Line The National Suicide Prevention Lifeline at 1-884.159.2486 or 911             My Care Team Members  Patient Care Team       Relationship Specialty Notifications Start End    [facility] PCP - General   11/3/20     Phone: [phone] Fax: [phone] [address] [facility] [address]    Karl Coto    8/19/16     [facility]     Phone: [phone]         Kd Leong MD Assigned PCP   1/1/17     Phone: [phone] Fax: [phone] [address] [facility] [address]    Michela Whitaker, TAWANA Lead Care Coordinator Primary Care - CC Admissions 11/1/19     Phone: [phone]          Kenya Dupont RN Specialty Care Coordinator Hematology & Oncology Admissions 6/4/20     Joshua Watts MD Assigned Cancer Care Provider   10/23/20     Phone: [phone] Fax: [phone] [address] [facility] [address] [place] [address]    Patel Bazzi MD Assigned Sleep Provider   10/23/20     Phone: [phone] Fax: [phone]         [address] [address]            My Care Plans  Self Management and Treatment Plan  Goals and (Comments)  Goals        General    #1 (pt-stated)     Notes - Note edited  10/2/2020  3:18 PM by Michela Whitaker RN    Goal Statement: I want my shortness of breath to improve    Date goal set: 11/1/19    Measure of Success: When I am not short of breath    Barriers: COPD    Strengths: Motivated    Date to Achieve By: March 2021    Patient expressed understanding of goal: Yes    Action steps to achieve this goal    1. I will use nebulizer as instructed  2. I will see PCP  as scheduled  3. I will use my asthma action plan       #3 Improve chronic symptoms (pt-stated)     Notes - Note edited  9/21/2020  1:13 PM by Michela Whitaker RN    Goal Statement: I want my edema to improve.  Date goal set: 12/5/2019   Measure of Success: I will be able to put support stockings on by myself.  Barriers: lymphedema  Strengths: care coordination, lymphedema clinic  Date to Achieve By: December 2020  Patient expressed understanding of goal: yes    Action steps to achieve this goal  1. I will elevate my legs throughout the day  2. I will attend lymphedema clinic appointments  3. I will take my diuretics as prescribed       #4 Monitoring (pt-stated)     Notes - Note edited  8/13/2020  3:02 PM by Michela Whitaker RN    Goal Statement: I will weigh myself every day.  Date goal set: 12/5/2019   Measure of Success: Consistent record of daily weights.  Barriers: Does not like scale  Strengths: care coordination support  Date to Achieve By: December 2020  Patient expressed understanding  of goal: yes    Action steps to achieve this goal  1. I will weigh myself every morning.  2. I will write my weight down in a notebook.  3. I will take an extra 20-40 mg of Lasix if my weight increases by 3 lbs.              Action Plans on File:   Asthma        Depression          Advance Care Plans/Directives Type:        My Medical and Care Information  Problem List   Patient Active Problem List   Diagnosis     Mild persistent asthma     Polyneuropathy in other diseases classified elsewhere (H)     DVT (deep venous thrombosis) (H)     Alcohol abuse, in remission     SECONDARY POLYCYTHEMIA     Chondromalacia of patella     Developmental reading disorder     Esophageal reflux     Hypothyroidism     Fatty liver     GILES (Obstructive Sleep Apnea)-Moderate (AHI 16)     RLS (restless legs syndrome)     Smoker     Erythrocytosis     Moderate mixed bipolar I disorder (H)     Personality disorder, depressive     COPD (chronic obstructive pulmonary disease) (H)     Rosacea     Health Care Home     DDD (degenerative disc disease), cervical     Benign neoplasm of colon (POLYPOSIS)     History of stroke symptoms, stroke ruled out on imaging     Somatization disorder     Sebaceous cyst of right axilla     HTN, goal below 140/90     Left leg pain     Vitamin D deficiency     Morbid obesity (H)     PVD (peripheral vascular disease) with claudication (H)     Cyst of left ovary     Morbid obesity with alveolar hypoventilation (H)     Severe episode of recurrent major depressive disorder (H)     Conductive hearing loss of left ear with restricted hearing of right ear     Sensorineural hearing loss (SNHL) of right ear with restricted hearing of left ear     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     COPD exacerbation (H)     Lymphedema of both lower extremities     Type 2 diabetes mellitus (H)     Bilateral edema of lower extremity     Dyspnea     Thrombocytopenia (H)      Current  Medications and Allergies:  See printed Medication Report.    Care Coordination Start Date: 11/1/2019   Frequency of Care Coordination: monthly   Form Last Updated: 11/04/2020

## 2020-11-05 ENCOUNTER — APPOINTMENT (OUTPATIENT)
Dept: CT IMAGING | Facility: CLINIC | Age: 55
End: 2020-11-05
Attending: NURSE PRACTITIONER
Payer: COMMERCIAL

## 2020-11-05 ENCOUNTER — OFFICE VISIT (OUTPATIENT)
Dept: FAMILY MEDICINE | Facility: CLINIC | Age: 55
End: 2020-11-05
Payer: COMMERCIAL

## 2020-11-05 ENCOUNTER — HOSPITAL ENCOUNTER (EMERGENCY)
Facility: CLINIC | Age: 55
Discharge: HOME | End: 2020-11-06
Attending: NURSE PRACTITIONER | Admitting: NURSE PRACTITIONER
Payer: COMMERCIAL

## 2020-11-05 VITALS
WEIGHT: 242 LBS | DIASTOLIC BLOOD PRESSURE: 60 MMHG | BODY MASS INDEX: 48.79 KG/M2 | RESPIRATION RATE: 18 BRPM | SYSTOLIC BLOOD PRESSURE: 110 MMHG | HEART RATE: 70 BPM | HEIGHT: 59 IN | TEMPERATURE: 98.2 F

## 2020-11-05 DIAGNOSIS — R14.0 ABDOMINAL DISTENSION: Primary | ICD-10-CM

## 2020-11-05 DIAGNOSIS — L03.311 ABDOMINAL WALL CELLULITIS: ICD-10-CM

## 2020-11-05 DIAGNOSIS — R06.02 SOB (SHORTNESS OF BREATH): ICD-10-CM

## 2020-11-05 LAB
ALBUMIN SERPL-MCNC: 3.4 G/DL (ref 3.4–5)
ALBUMIN UR-MCNC: 100 MG/DL
ALP SERPL-CCNC: 117 U/L (ref 40–150)
ALT SERPL W P-5'-P-CCNC: 17 U/L (ref 0–50)
ANION GAP SERPL CALCULATED.3IONS-SCNC: 4 MMOL/L (ref 3–14)
APPEARANCE UR: CLEAR
AST SERPL W P-5'-P-CCNC: 13 U/L (ref 0–45)
BASOPHILS # BLD AUTO: 0 10E9/L (ref 0–0.2)
BASOPHILS NFR BLD AUTO: 0.7 %
BILIRUB DIRECT SERPL-MCNC: 0.2 MG/DL (ref 0–0.2)
BILIRUB SERPL-MCNC: 0.6 MG/DL (ref 0.2–1.3)
BILIRUB UR QL STRIP: NEGATIVE
BUN SERPL-MCNC: 12 MG/DL (ref 7–30)
CALCIUM SERPL-MCNC: 9.1 MG/DL (ref 8.5–10.1)
CHLORIDE SERPL-SCNC: 104 MMOL/L (ref 94–109)
CO2 SERPL-SCNC: 35 MMOL/L (ref 20–32)
COLOR UR AUTO: YELLOW
CREAT SERPL-MCNC: 0.68 MG/DL (ref 0.52–1.04)
DIFFERENTIAL METHOD BLD: ABNORMAL
EOSINOPHIL # BLD AUTO: 0.1 10E9/L (ref 0–0.7)
EOSINOPHIL NFR BLD AUTO: 1.3 %
ERYTHROCYTE [DISTWIDTH] IN BLOOD BY AUTOMATED COUNT: 20.4 % (ref 10–15)
GFR SERPL CREATININE-BSD FRML MDRD: >90 ML/MIN/{1.73_M2}
GLUCOSE BLDC GLUCOMTR-MCNC: 165 MG/DL (ref 70–99)
GLUCOSE SERPL-MCNC: 89 MG/DL (ref 70–99)
GLUCOSE UR STRIP-MCNC: NEGATIVE MG/DL
HCT VFR BLD AUTO: 42.2 % (ref 35–47)
HGB BLD-MCNC: 11.7 G/DL (ref 11.7–15.7)
HGB UR QL STRIP: NEGATIVE
HYALINE CASTS #/AREA URNS LPF: 10 /LPF (ref 0–2)
IMM GRANULOCYTES # BLD: 0 10E9/L (ref 0–0.4)
IMM GRANULOCYTES NFR BLD: 0.2 %
INR PPP: 2.48 (ref 0.86–1.14)
KETONES UR STRIP-MCNC: NEGATIVE MG/DL
LACTATE BLD-SCNC: 1.3 MMOL/L (ref 0.7–2)
LEUKOCYTE ESTERASE UR QL STRIP: NEGATIVE
LIPASE SERPL-CCNC: 70 U/L (ref 73–393)
LYMPHOCYTES # BLD AUTO: 1.3 10E9/L (ref 0.8–5.3)
LYMPHOCYTES NFR BLD AUTO: 21.2 %
MCH RBC QN AUTO: 22.8 PG (ref 26.5–33)
MCHC RBC AUTO-ENTMCNC: 27.7 G/DL (ref 31.5–36.5)
MCV RBC AUTO: 82 FL (ref 78–100)
MONOCYTES # BLD AUTO: 0.4 10E9/L (ref 0–1.3)
MONOCYTES NFR BLD AUTO: 7 %
MUCOUS THREADS #/AREA URNS LPF: PRESENT /LPF
NEUTROPHILS # BLD AUTO: 4.2 10E9/L (ref 1.6–8.3)
NEUTROPHILS NFR BLD AUTO: 69.6 %
NITRATE UR QL: NEGATIVE
NRBC # BLD AUTO: 0 10*3/UL
NRBC BLD AUTO-RTO: 0 /100
PH UR STRIP: 7 PH (ref 5–7)
PLATELET # BLD AUTO: 102 10E9/L (ref 150–450)
POTASSIUM SERPL-SCNC: 3.5 MMOL/L (ref 3.4–5.3)
PROT SERPL-MCNC: 6.2 G/DL (ref 6.8–8.8)
RBC # BLD AUTO: 5.13 10E12/L (ref 3.8–5.2)
RBC #/AREA URNS AUTO: 1 /HPF (ref 0–2)
SODIUM SERPL-SCNC: 143 MMOL/L (ref 133–144)
SOURCE: ABNORMAL
SP GR UR STRIP: 1.01 (ref 1–1.03)
SQUAMOUS #/AREA URNS AUTO: <1 /HPF (ref 0–1)
UROBILINOGEN UR STRIP-MCNC: 0 MG/DL (ref 0–2)
WBC # BLD AUTO: 6 10E9/L (ref 4–11)
WBC #/AREA URNS AUTO: 1 /HPF (ref 0–5)

## 2020-11-05 PROCEDURE — 87040 BLOOD CULTURE FOR BACTERIA: CPT | Performed by: NURSE PRACTITIONER

## 2020-11-05 PROCEDURE — 250N000009 HC RX 250: Performed by: NURSE PRACTITIONER

## 2020-11-05 PROCEDURE — 96374 THER/PROPH/DIAG INJ IV PUSH: CPT | Mod: 59 | Performed by: NURSE PRACTITIONER

## 2020-11-05 PROCEDURE — 258N000003 HC RX IP 258 OP 636: Performed by: NURSE PRACTITIONER

## 2020-11-05 PROCEDURE — 83690 ASSAY OF LIPASE: CPT | Performed by: NURSE PRACTITIONER

## 2020-11-05 PROCEDURE — 250N000011 HC RX IP 250 OP 636: Performed by: NURSE PRACTITIONER

## 2020-11-05 PROCEDURE — 250N000011 HC RX IP 250 OP 636

## 2020-11-05 PROCEDURE — 87086 URINE CULTURE/COLONY COUNT: CPT | Performed by: NURSE PRACTITIONER

## 2020-11-05 PROCEDURE — 99285 EMERGENCY DEPT VISIT HI MDM: CPT | Performed by: NURSE PRACTITIONER

## 2020-11-05 PROCEDURE — 99214 OFFICE O/P EST MOD 30 MIN: CPT | Performed by: FAMILY MEDICINE

## 2020-11-05 PROCEDURE — 99285 EMERGENCY DEPT VISIT HI MDM: CPT | Mod: 25 | Performed by: NURSE PRACTITIONER

## 2020-11-05 PROCEDURE — 81001 URINALYSIS AUTO W/SCOPE: CPT | Performed by: NURSE PRACTITIONER

## 2020-11-05 PROCEDURE — 83605 ASSAY OF LACTIC ACID: CPT | Performed by: NURSE PRACTITIONER

## 2020-11-05 PROCEDURE — 85025 COMPLETE CBC W/AUTO DIFF WBC: CPT | Performed by: NURSE PRACTITIONER

## 2020-11-05 PROCEDURE — 999N001017 HC STATISTIC GLUCOSE BY METER IP

## 2020-11-05 PROCEDURE — 85610 PROTHROMBIN TIME: CPT | Performed by: NURSE PRACTITIONER

## 2020-11-05 PROCEDURE — 96361 HYDRATE IV INFUSION ADD-ON: CPT | Performed by: NURSE PRACTITIONER

## 2020-11-05 PROCEDURE — 74177 CT ABD & PELVIS W/CONTRAST: CPT

## 2020-11-05 PROCEDURE — 80076 HEPATIC FUNCTION PANEL: CPT | Performed by: NURSE PRACTITIONER

## 2020-11-05 PROCEDURE — 96375 TX/PRO/DX INJ NEW DRUG ADDON: CPT | Performed by: NURSE PRACTITIONER

## 2020-11-05 PROCEDURE — 80048 BASIC METABOLIC PNL TOTAL CA: CPT | Performed by: NURSE PRACTITIONER

## 2020-11-05 RX ORDER — NALOXONE HYDROCHLORIDE 0.4 MG/ML
INJECTION, SOLUTION INTRAMUSCULAR; INTRAVENOUS; SUBCUTANEOUS
Status: COMPLETED
Start: 2020-11-05 | End: 2020-11-05

## 2020-11-05 RX ORDER — ONDANSETRON 2 MG/ML
4 INJECTION INTRAMUSCULAR; INTRAVENOUS EVERY 30 MIN PRN
Status: DISCONTINUED | OUTPATIENT
Start: 2020-11-05 | End: 2020-11-06 | Stop reason: HOSPADM

## 2020-11-05 RX ORDER — IOPAMIDOL 755 MG/ML
100 INJECTION, SOLUTION INTRAVASCULAR ONCE
Status: COMPLETED | OUTPATIENT
Start: 2020-11-05 | End: 2020-11-05

## 2020-11-05 RX ORDER — HYDROMORPHONE HYDROCHLORIDE 1 MG/ML
0.5 INJECTION, SOLUTION INTRAMUSCULAR; INTRAVENOUS; SUBCUTANEOUS
Status: DISCONTINUED | OUTPATIENT
Start: 2020-11-05 | End: 2020-11-06 | Stop reason: HOSPADM

## 2020-11-05 RX ORDER — SODIUM CHLORIDE, SODIUM LACTATE, POTASSIUM CHLORIDE, CALCIUM CHLORIDE 600; 310; 30; 20 MG/100ML; MG/100ML; MG/100ML; MG/100ML
INJECTION, SOLUTION INTRAVENOUS CONTINUOUS
Status: DISCONTINUED | OUTPATIENT
Start: 2020-11-05 | End: 2020-11-06 | Stop reason: HOSPADM

## 2020-11-05 RX ORDER — NALOXONE HYDROCHLORIDE 0.4 MG/ML
0.4 INJECTION, SOLUTION INTRAMUSCULAR; INTRAVENOUS; SUBCUTANEOUS
Status: DISCONTINUED | OUTPATIENT
Start: 2020-11-05 | End: 2020-11-06 | Stop reason: HOSPADM

## 2020-11-05 RX ADMIN — SODIUM CHLORIDE 59 ML: 9 INJECTION, SOLUTION INTRAVENOUS at 23:52

## 2020-11-05 RX ADMIN — SODIUM CHLORIDE, POTASSIUM CHLORIDE, SODIUM LACTATE AND CALCIUM CHLORIDE 1000 ML: 600; 310; 30; 20 INJECTION, SOLUTION INTRAVENOUS at 20:37

## 2020-11-05 RX ADMIN — IOPAMIDOL 100 ML: 755 INJECTION, SOLUTION INTRAVENOUS at 23:51

## 2020-11-05 RX ADMIN — ONDANSETRON 4 MG: 2 INJECTION INTRAMUSCULAR; INTRAVENOUS at 20:47

## 2020-11-05 RX ADMIN — NALXONE HYDROCHLORIDE 0.4 MG: 0.4 INJECTION INTRAMUSCULAR; INTRAVENOUS; SUBCUTANEOUS at 23:06

## 2020-11-05 RX ADMIN — SODIUM CHLORIDE, POTASSIUM CHLORIDE, SODIUM LACTATE AND CALCIUM CHLORIDE 1000 ML: 600; 310; 30; 20 INJECTION, SOLUTION INTRAVENOUS at 23:07

## 2020-11-05 RX ADMIN — HYDROMORPHONE HYDROCHLORIDE 0.5 MG: 1 INJECTION, SOLUTION INTRAMUSCULAR; INTRAVENOUS; SUBCUTANEOUS at 20:46

## 2020-11-05 ASSESSMENT — MIFFLIN-ST. JEOR
SCORE: 1592.45
SCORE: 1600.39

## 2020-11-05 NOTE — PROGRESS NOTES
SUBJECTIVE   Bia Bill is a 54 year old female who presents with     Abdominal/Flank Pain  Onset/Duration: 1 week Recheck -Gotten worse.   Description:   Character: Sharp- feels like her stomach is being stabbed  Location: Thru the whole stomach now   Radiation: None  Intensity: moderate, severe  Progression of Symptoms:  worsening  Accompanying Signs & Symptoms:  Fever/Chills: YES- chills   Gas/Bloating: YES- Gas   Nausea: YES  Vomitting: no  Diarrhea: YES  Constipation: no  Dysuria or Hematuria: no  History:   Trauma: no  Previous similar pain: no  Previous tests done: none  Precipitating factors:   Does the pain change with:     Food: no- but when she tries to eat she feels full right away so she can't eat     Bowel Movement: no    Urination: no   Other factors:  no  Therapies tried and outcome: Keflex     Patient's last menstrual period was 09/27/2009.    Runny stool.   Can't eat because she feels so full.       PCP   [facility] [phone]    Health Maintenance        Health Maintenance Due   Topic Date Due     DIABETIC FOOT EXAM  1965     HEPATITIS C SCREENING  12/03/1984     HEPATITIS B IMMUNIZATION (1 of 3 - Risk 3-dose series) 12/03/1985     ZOSTER IMMUNIZATION (1 of 2) 12/03/2016     MAMMO SCREENING  03/24/2019     INFLUENZA VACCINE (1) 09/01/2020     A1C  09/22/2020     EYE EXAM  11/15/2020       HPI        Patient Active Problem List   Diagnosis     Mild persistent asthma     Polyneuropathy in other diseases classified elsewhere (H)     DVT (deep venous thrombosis) (H)     Alcohol abuse, in remission     SECONDARY POLYCYTHEMIA     Chondromalacia of patella     Developmental reading disorder     Esophageal reflux     Hypothyroidism     Fatty liver     GILES (Obstructive Sleep Apnea)-Moderate (AHI 16)     RLS (restless legs syndrome)     Smoker     Erythrocytosis     Moderate mixed bipolar I disorder (H)     Personality disorder, depressive     COPD (chronic obstructive pulmonary  disease) (H)     Rosacea     Health Care Home     DDD (degenerative disc disease), cervical     Benign neoplasm of colon (POLYPOSIS)     History of stroke symptoms, stroke ruled out on imaging     Somatization disorder     Sebaceous cyst of right axilla     HTN, goal below 140/90     Left leg pain     Vitamin D deficiency     Morbid obesity (H)     PVD (peripheral vascular disease) with claudication (H)     Cyst of left ovary     Morbid obesity with alveolar hypoventilation (H)     Severe episode of recurrent major depressive disorder (H)     Conductive hearing loss of left ear with restricted hearing of right ear     Sensorineural hearing loss (SNHL) of right ear with restricted hearing of left ear     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     COPD exacerbation (H)     Lymphedema of both lower extremities     Type 2 diabetes mellitus (H)     Bilateral edema of lower extremity     Dyspnea     Thrombocytopenia (H)     Current Outpatient Medications   Medication     acetaminophen (TYLENOL) 500 MG tablet     ADVAIR -21 MCG/ACT inhaler     albuterol (PROAIR HFA/PROVENTIL HFA/VENTOLIN HFA) 108 (90 Base) MCG/ACT inhaler     albuterol (PROVENTIL) (2.5 MG/3ML) 0.083% neb solution     alcohol swab prep pads     allopurinol (ZYLOPRIM) 300 MG tablet     ARIPiprazole (ABILIFY) 10 MG tablet     blood glucose (NO BRAND SPECIFIED) test strip     blood glucose calibration (NO BRAND SPECIFIED) solution     blood glucose monitoring (NO BRAND SPECIFIED) meter device kit     buPROPion (WELLBUTRIN XL) 150 MG 24 hr tablet     cephALEXin (KEFLEX) 500 MG capsule     EPINEPHrine (ANY BX GENERIC EQUIV) 0.3 MG/0.3ML injection 2-pack     furosemide (LASIX) 40 MG tablet     gabapentin (NEURONTIN) 300 MG capsule     gabapentin (NEURONTIN) 600 MG tablet     ipratropium - albuterol 0.5 mg/2.5 mg/3 mL (DUONEB) 0.5-2.5 (3) MG/3ML neb solution     lamoTRIgine (LAMICTAL) 100 MG tablet      levothyroxine (SYNTHROID/LEVOTHROID) 175 MCG tablet     metFORMIN (GLUCOPHAGE) 1000 MG tablet     mometasone-formoterol (DULERA) 200-5 MCG/ACT oral inhaler     omeprazole (PRILOSEC) 20 MG DR capsule     order for DME     order for DME     order for DME     order for DME     order for DME     potassium chloride ER (KLOR-CON M) 10 MEQ CR tablet     pramipexole (MIRAPEX) 0.125 MG tablet     simvastatin (ZOCOR) 20 MG tablet     thin (NO BRAND SPECIFIED) lancets     traZODone (DESYREL) 150 MG tablet     trimethoprim-polymyxin b (POLYTRIM) 52227-6.1 UNIT/ML-% ophthalmic solution     VITAMIN D3 25 MCG (1000 UT) tablet     warfarin ANTICOAGULANT (JANTOVEN ANTICOAGULANT) 5 MG tablet     No current facility-administered medications for this visit.        Patient Active Problem List   Diagnosis     Mild persistent asthma     Polyneuropathy in other diseases classified elsewhere (H)     DVT (deep venous thrombosis) (H)     Alcohol abuse, in remission     SECONDARY POLYCYTHEMIA     Chondromalacia of patella     Developmental reading disorder     Esophageal reflux     Hypothyroidism     Fatty liver     GILES (Obstructive Sleep Apnea)-Moderate (AHI 16)     RLS (restless legs syndrome)     Smoker     Erythrocytosis     Moderate mixed bipolar I disorder (H)     Personality disorder, depressive     COPD (chronic obstructive pulmonary disease) (H)     Rosacea     Health Care Home     DDD (degenerative disc disease), cervical     Benign neoplasm of colon (POLYPOSIS)     History of stroke symptoms, stroke ruled out on imaging     Somatization disorder     Sebaceous cyst of right axilla     HTN, goal below 140/90     Left leg pain     Vitamin D deficiency     Morbid obesity (H)     PVD (peripheral vascular disease) with claudication (H)     Cyst of left ovary     Morbid obesity with alveolar hypoventilation (H)     Severe episode of recurrent major depressive disorder (H)     Conductive hearing loss of left ear with restricted hearing of  right ear     Sensorineural hearing loss (SNHL) of right ear with restricted hearing of left ear     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     COPD exacerbation (H)     Lymphedema of both lower extremities     Type 2 diabetes mellitus (H)     Bilateral edema of lower extremity     Dyspnea     Thrombocytopenia (H)     Past Surgical History:   Procedure Laterality Date     BONE MARROW BIOPSY, BONE SPECIMEN, NEEDLE/TROCAR N/A 11/17/2014    Procedure: BIOPSY BONE MARROW;  Surgeon: Hay Aaron MD;  Location: [facility]     D & C  10/26/09    with uterine ablation     ENDOVASCULAR PLACEMENT VASCULAR DEVICE Left     Left leg stent x 3     ESOPHAGOSCOPY, GASTROSCOPY, DUODENOSCOPY (EGD), COMBINED  4/21/2014    Procedure: Gastroscopy;  Surgeon: Moris Thomas MD;  Location: [facility]     HYSTERECTOMY, PAP NO LONGER INDICATED  1-4-2010     LAPAROSCOPIC CHOLECYSTECTOMY N/A 1/4/2019    Procedure: Laparoscopic cholecystectomy;  Surgeon: Aaron Siegel MD;  Location: [facility]     LITHOTRIPSY  2004    Lithotrypsy       Social History     Tobacco Use     Smoking status: Current Every Day Smoker     Packs/day: 0.50     Years: 43.00     Pack years: 21.50     Types: Cigarettes     Smokeless tobacco: Never Used     Tobacco comment: started at age 11.  Quit during pregnancyX4.  Quit 3 months ago.    Substance Use Topics     Alcohol use: No     Alcohol/week: 0.0 standard drinks     Comment: quit 1995     Family History   Problem Relation Age of Onset     Hypertension Mother      Diabetes Mother      Blood Disease Mother      Heart Disease Mother         chf     Cerebrovascular Disease Mother      Hypertension Father      Cancer Father         lung cancer     Allergies Sister      Diabetes Sister      Depression Sister      Hypertension Sister          Current Outpatient Medications   Medication Sig Dispense Refill     acetaminophen (TYLENOL) 500 MG tablet Take 500-1,000 mg by mouth  every 6 hours as needed for mild pain       ADVAIR -21 MCG/ACT inhaler INHALE TWO PUFFS BY MOUTH TWICE A DAY 12 g 3     albuterol (PROAIR HFA/PROVENTIL HFA/VENTOLIN HFA) 108 (90 Base) MCG/ACT inhaler INHALE TWO PUFFS BY MOUTH EVERY 6 HOURS AS NEEDED FOR SHORTNESS OF BREATH DIFFICULTY BREATHING OR WHEEZING 18 g 0     albuterol (PROVENTIL) (2.5 MG/3ML) 0.083% neb solution Take 1 vial (2.5 mg) by nebulization every 6 hours as needed for shortness of breath / dyspnea or wheezing 1 Box 0     alcohol swab prep pads Use to swab area of injection/mike as directed. 100 each 3     allopurinol (ZYLOPRIM) 300 MG tablet TAKE ONE TABLET BY MOUTH ONCE DAILY 90 tablet 1     ARIPiprazole (ABILIFY) 10 MG tablet TAKE ONE TABLET BY MOUTH ONCE DAILY 90 tablet 1     blood glucose (NO BRAND SPECIFIED) test strip Use to test blood sugar 2-3x/day. To accompany: Blood Glucose Monitor Brands: per insurance. 100 strip 11     blood glucose calibration (NO BRAND SPECIFIED) solution To accompany: Blood Glucose Monitor Brands: per insurance. 1 Bottle 3     blood glucose monitoring (NO BRAND SPECIFIED) meter device kit Use to test blood sugar daily or as directed. Preferred blood glucose meter OR supplies to accompany: Blood Glucose Monitor Brands: per insurance. 1 kit 0     buPROPion (WELLBUTRIN XL) 150 MG 24 hr tablet TAKE 1 TABLET BY MOUTH EVERY EVENING 150 tablet 0     cephALEXin (KEFLEX) 500 MG capsule Take 1 capsule (500 mg) by mouth 4 times daily for 7 days 28 capsule 0     EPINEPHrine (ANY BX GENERIC EQUIV) 0.3 MG/0.3ML injection 2-pack Inject 0.3 mLs (0.3 mg) into the muscle once as needed for anaphylaxis 0.6 mL 0     furosemide (LASIX) 40 MG tablet Take 1 tablet (40 mg) by mouth 2 times daily 180 tablet 4     gabapentin (NEURONTIN) 300 MG capsule Take 1 capsule (300 mg) by mouth At Bedtime TAKE ONE CAPSULE BY MOUTH ONCE DAILY AT BEDTIME WITH A 600 MG TABLET FOR A TOTAL DOSE  MG 90 capsule 3     gabapentin (NEURONTIN) 600 MG  tablet TAKE ONE TABLET BY MOUTH THREE TIMES A DAY 90 tablet 3     ipratropium - albuterol 0.5 mg/2.5 mg/3 mL (DUONEB) 0.5-2.5 (3) MG/3ML neb solution Take 1 vial (3 mLs) by nebulization every 6 hours as needed for shortness of breath / dyspnea or wheezing 30 vial 11     lamoTRIgine (LAMICTAL) 100 MG tablet TAKE ONE TABLET BY MOUTH ONCE DAILY 90 tablet 1     levothyroxine (SYNTHROID/LEVOTHROID) 175 MCG tablet TAKE ONE TABLET BY MOUTH ONCE DAILY 90 tablet 0     metFORMIN (GLUCOPHAGE) 1000 MG tablet TAKE ONE TABLET BY MOUTH TWICE A DAY WITH MEALS 180 tablet 0     mometasone-formoterol (DULERA) 200-5 MCG/ACT oral inhaler Inhale 2 puffs into the lungs 2 times daily 13 g 1     omeprazole (PRILOSEC) 20 MG DR capsule Take 1 capsule (20 mg) by mouth 2 times daily 60 capsule 11     order for DME Equipment being ordered:x2 [employer] 30/40mmHg compression wraps with x2 extra prs of compression liners 1 each 0     order for DME Equipment being ordered: Nebulizer 1 Device 0     order for DME Equipment being ordered: CPAP  AIRSENSE 10  5-18 CM H20  SN# [id number]   DN# [id number]       order for DME Equipment being ordered: DEPENDS SIZE LARGE 60 Month 5     order for DME Equipment being ordered: INCONTINENCE PADS   QID 1 Month 11     potassium chloride ER (KLOR-CON M) 10 MEQ CR tablet TAKE ONE TABLET BY MOUTH ONCE DAILY 90 tablet 2     pramipexole (MIRAPEX) 0.125 MG tablet Take 1-20 tablets (0.125-2.5 mg) by mouth At Bedtime TAKE 1-2 TABLETS BY MOUTH AT BEDTIME 180 tablet 1     simvastatin (ZOCOR) 20 MG tablet TAKE ONE TABLET BY MOUTH EVERY NIGHT AT BEDTIME 90 tablet 2     thin (NO BRAND SPECIFIED) lancets Use with lanceting device. To accompany: Blood Glucose Monitor Brands: per insurance. 1 each 6     traZODone (DESYREL) 150 MG tablet TAKE ONE TABLET BY MOUTH EVERY NIGHT AT BEDTIME 90 tablet 1     trimethoprim-polymyxin b (POLYTRIM) 12016-2.1 UNIT/ML-% ophthalmic solution Place 1-2 drops Into the left eye every 6 hours 10 mL 0      VITAMIN D3 25 MCG (1000 UT) tablet TAKE ONE TABLET BY MOUTH ONCE DAILY 100 tablet 2     warfarin ANTICOAGULANT (JANTOVEN ANTICOAGULANT) 5 MG tablet TAKE TWO AND ONE-HALF TABLETS BY MOUTH EVERY MONDAY, TWO TABLETS ON ALL OTHER DAYS OR AS DIRECTED BY ANTICOAGULATION CLINIC 157 tablet 1     Allergies   Allergen Reactions     Darvocet [Propoxyphene N-Apap] Anaphylaxis     Darvocet,Percocet      Percocet [Oxycodone-Acetaminophen] Anaphylaxis, Hives and Swelling     Has tolerated hydromorphone in the past.      Asa [Aspirin] Hives     Aspirin causes seizures and hives, throat swelling.   Has tolerated ketorolac in the past.        Bee      Ibuprofen Swelling     Throat swelling per patient      Lyrica [Pregabalin] Other (See Comments) and Swelling     dizziness     Povidone Iodine Rash     Reaction to topical betadine     Tape [Adhesive Tape] Rash     Recent Labs   Lab Test 10/29/20  1111 06/22/20  0950 06/19/20  0917 01/23/20  0444 01/22/20 2056 10/29/19  0822 10/29/19  0822 09/27/19  1309 09/27/19  1309 04/06/18  1316 04/06/18  1316   A1C  --  6.2*  --  7.2*  --   --  6.1*  --   --   --   --    LDL  --  36  --   --   --   --   --   --  48  --  101*   HDL  --  32*  --   --   --   --   --   --  31*  --  46*   TRIG  --  213*  --   --   --   --   --   --  173*  --  198*   ALT 15 18  --   --  16   < > 42   < >  --    < >  --    CR 0.69 0.71  --  0.66 0.82   < > 0.69   < >  --    < >  --    GFRESTIMATED >90 >90  --  >90 81   < > >90   < >  --    < >  --    GFRESTBLACK >90 >90  --  >90 >90   < > >90   < >  --    < >  --    POTASSIUM 4.0 3.9  --  3.7 3.2*   < > 4.1   < >  --    < >  --    TSH  --  3.34 4.00  --   --    < >  --   --  6.91*  --  1.07    < > = values in this interval not displayed.      BP Readings from Last 3 Encounters:   11/05/20 110/60   10/29/20 120/60   08/21/20 119/62    Wt Readings from Last 3 Encounters:   11/05/20 109.8 kg (242 lb)   10/29/20 109.8 kg (242 lb)   06/22/20 100.1 kg (220 lb 9.6 oz)     "                Reviewed and updated:  Tobacco  Allergies  Meds  Med Hx  Surg Hx  Fam Hx  Soc Hx     ROS:   ROS: 10 point ROS neg other than the symptoms noted above in the HPI.    PHYSICAL EXAM   /60 (Cuff Size: Adult Regular)   Pulse 70   Temp 98.2  F (36.8  C) (Tympanic)   Resp 18   Ht 1.486 m (4' 10.5\")   Wt 109.8 kg (242 lb)   LMP 09/27/2009   Breastfeeding No   BMI 49.72 kg/m    Body mass index is 49.72 kg/m .  GENERAL: obese, mild respiratory distress.   EYES: Eyes grossly normal to inspection, conjunctivae and sclerae normal  RESP: lungs clear to auscultation - no rales, rhonchi or wheezes  CV: regular rate and rhythm, normal S1 S2, no S3 or S4  ABDOMEN: severely distended, tender to palpation diffusely, fluid thrill and shifting dullness present.   MS: no gross musculoskeletal defects noted, no edema  SKIN: Erythema across abdomen (see photo).   NEURO: Normal strength and tone, mentation intact and speech normal  PSYCH: mentation appears normal, affect normal/bright        CT ABDOMEN PELVIS W CONTRAST 11/3/2020 1:32 PM  IMPRESSION:   1.  No acute intra-abdominal or intrapelvic findings. No evidence of a  bowel obstruction or inflammatory process.  2.  Diffuse body wall edema.  3.  Stable splenomegaly.      Assessment & Plan     Abdominal distension  --54-year-old female presented with worsening abdominal distention, abdominal wall redness, bilateral pedal edema.  History of alcohol use, not in the recent past.  Past medical history significant for multiple comorbidities including erythrocytosis, type 2 diabetes mellitus, peripheral vascular disease.  Patient was started on cephalexin for suspected abdominal wall cellulitis about a week ago.  CT abdomen/pelvis was remarkable for diffuse body wall edema and a stable splenomegaly.  Physical examination as described above.  Differentials discussed in detail including but not limited to abdominal wall cellulitis, chronic liver disease with " ascites, portal vein thrombosis.  Needs further evaluation to delineate a specific diagnosis.  Sister to go ER for further evaluation and management considering worsening symptoms.  ER staff member informed.  Patient understood and in agreement with above plan.  All questions answered.    SOB (shortness of breath)  --Likely related to abdominal distention          Will Smith MD  [facility]

## 2020-11-05 NOTE — NURSING NOTE
"Chief Complaint   Patient presents with     Abdominal Pain     /60 (Cuff Size: Adult Regular)   Pulse 70   Temp 98.2  F (36.8  C) (Tympanic)   Resp 18   Ht 1.486 m (4' 10.5\")   Wt 109.8 kg (242 lb)   LMP 09/27/2009   Breastfeeding No   BMI 49.72 kg/m   Estimated body mass index is 49.72 kg/m  as calculated from the following:    Height as of this encounter: 1.486 m (4' 10.5\").    Weight as of this encounter: 109.8 kg (242 lb).  Patient presents to the clinic using No DME      Health Maintenance that is potentially due pending provider review:    Health Maintenance Due   Topic Date Due     DIABETIC FOOT EXAM  1965     HEPATITIS C SCREENING  12/03/1984     HEPATITIS B IMMUNIZATION (1 of 3 - Risk 3-dose series) 12/03/1985     ZOSTER IMMUNIZATION (1 of 2) 12/03/2016     MAMMO SCREENING  03/24/2019     INFLUENZA VACCINE (1) 09/01/2020     A1C  09/22/2020     EYE EXAM  11/15/2020                "

## 2020-11-05 NOTE — ED AVS SNAPSHOT
[facility]  [address] [facility] [address]  Phone: [phone]  Fax: [phone]                                    Bia Bill   MRN: [id number]    Department: [facility]   Date of Visit: 11/5/2020           After Visit Summary Signature Page    I have received my discharge instructions, and my questions have been answered. I have discussed any challenges I see with this plan with the nurse or doctor.    ..........................................................................................................................................  Patient/Patient Representative Signature      ..........................................................................................................................................  Patient Representative Print Name and Relationship to Patient    ..................................................               ................................................  Date                                   Time    ..........................................................................................................................................  Reviewed by Signature/Title    ...................................................              ..............................................  Date                                               Time          22EPIC Rev 08/18

## 2020-11-06 ENCOUNTER — DOCUMENTATION ONLY (OUTPATIENT)
Dept: FAMILY MEDICINE | Facility: CLINIC | Age: 55
End: 2020-11-06

## 2020-11-06 VITALS
WEIGHT: 242 LBS | RESPIRATION RATE: 22 BRPM | BODY MASS INDEX: 50.8 KG/M2 | HEART RATE: 92 BPM | OXYGEN SATURATION: 91 % | SYSTOLIC BLOOD PRESSURE: 142 MMHG | TEMPERATURE: 98.5 F | DIASTOLIC BLOOD PRESSURE: 67 MMHG | HEIGHT: 58 IN

## 2020-11-06 DIAGNOSIS — I82.409 DVT (DEEP VENOUS THROMBOSIS) (H): ICD-10-CM

## 2020-11-06 DIAGNOSIS — I73.9 PVD (PERIPHERAL VASCULAR DISEASE) WITH CLAUDICATION (H): ICD-10-CM

## 2020-11-06 DIAGNOSIS — Z86.73 HISTORY OF STROKE: ICD-10-CM

## 2020-11-06 RX ORDER — CEPHALEXIN 500 MG/1
500 CAPSULE ORAL 4 TIMES DAILY
Qty: 28 CAPSULE | Refills: 0 | Status: ON HOLD | OUTPATIENT
Start: 2020-11-06 | End: 2020-11-11

## 2020-11-06 NOTE — ED NOTES
Pt resting in bed, snoring. States pain is better. Sitting up in bed, sats 88-92% on 3L. NAD. Awaiting imaging.

## 2020-11-06 NOTE — ED NOTES
Pt remains snoring in bed, unable to wake. Provider at bedside. Pinpoint pupils, trying to reposition and still not waking. Repositioned, blood sugar check =165. ST on monitor. Ax temp 99.4

## 2020-11-06 NOTE — DISCHARGE INSTRUCTIONS
Continue taking your cephalexin 500 mg by mouth 4 times daily.  I have ordered a new prescription I want you to take for a total of 14 days.  Please continue to take your furosemide Lasix twice daily this is imperative to help with your healing process as well.  Follow-up with Dr. Smith early next week for recheck.

## 2020-11-06 NOTE — PROGRESS NOTES
ANTICOAGULATION  MANAGEMENT: Discharge Review    Bia Bill chart reviewed for anticoagulation continuity of care    Emergency room visit on 10/05/20 for Abdonimal wall cellulitis and abdominal pain    Discharge disposition: Home    Results:    Recent labs: (last 7 days)     11/05/20 2034   INR 2.48*     Anticoagulation inpatient management:     home regimen continued    Anticoagulation discharge instructions:     Warfarin dosing: home regimen continued   Bridging: No   INR goal change: No      Medication changes affecting anticoagulation: Yes: Cephalexin 11/6-11/13    Additional factors affecting anticoagulation: No    Plan     Recommend to check INR on 11/9/20    Left a detailed message for irving requesting her to callback to schedule INR early next week due to antibiotic started yesterday    Anticoagulation Calendar updated    Tara Rivas RN

## 2020-11-06 NOTE — ED PROVIDER NOTES
History     Chief Complaint   Patient presents with     Abdominal Pain     HPI  Bia Bill is a 54 year old female with past medical history of anxiety, depression, bipolar disorder, DVT on chronic anticoagulation, type 2 diabetes, fatty liver, history of alcohol disorder recovered for 20 years, tobacco abuse who presents to the emergency room with persistent abdominal pain and questionably worsening abdominal wall cellulitis.  Patient was sent here from [facility] after being seen today.  Patient reports onset of abdominal pain was two weeks ago with associated nausea, erythema of her abdominal wall, and anorexia due to pain following ingestion of any solid or liquid food  Notes onset of black watery tarry stools yesterday  Patient seen on 1029 by primary care and initiated on Keflex 500 mg p.o. 4 times daily for abdominal wall cellulitis.  Reports abdominal, feels like needles going through her all around her abdomen and it never stops  Rates pain level 05/10 and 13/10  Admits to nausea but denies vomiting  Admits to erratic blood sugars    Allergies:  Allergies   Allergen Reactions     Darvocet [Propoxyphene N-Apap] Anaphylaxis     Darvocet,Percocet      Percocet [Oxycodone-Acetaminophen] Anaphylaxis, Hives and Swelling     Has tolerated hydromorphone in the past.      Asa [Aspirin] Hives     Aspirin causes seizures and hives, throat swelling.   Has tolerated ketorolac in the past.        Bee      Ibuprofen Swelling     Throat swelling per patient      Lyrica [Pregabalin] Other (See Comments) and Swelling     dizziness     Povidone Iodine Rash     Reaction to topical betadine     Tape [Adhesive Tape] Rash       Problem List:    Patient Active Problem List    Diagnosis Date Noted     Thrombocytopenia (H) 06/22/2020     Priority: Medium     Dyspnea 01/23/2020     Priority: Medium     Type 2 diabetes mellitus (H) 12/10/2019     Priority: Medium     A1c Oct 2019 = 6.1       Bilateral edema of lower  extremity 12/10/2019     Priority: Medium     COPD exacerbation (H) 10/29/2019     Priority: Medium     Lymphedema of both lower extremities 10/29/2019     Priority: Medium     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 10/03/2018     Priority: Medium     Conductive hearing loss of left ear with restricted hearing of right ear 04/17/2018     Priority: Medium     Sensorineural hearing loss (SNHL) of right ear with restricted hearing of left ear 04/17/2018     Priority: Medium     Morbid obesity with alveolar hypoventilation (H) 02/01/2017     Priority: Medium     Cyst of left ovary 01/11/2016     Priority: Medium     PVD (peripheral vascular disease) with claudication (H) 10/30/2015     Priority: Medium     LEFT LE. STENT x 3 LEFT UPPER LEG       Morbid obesity (H) 06/17/2015     Priority: Medium     Vitamin D deficiency 11/05/2014     Priority: Medium     Problem list name updated by automated process. Provider to review       Sebaceous cyst of right axilla 10/14/2014     Priority: Medium     HTN, goal below 140/90 10/14/2014     Priority: Medium     Left leg pain 10/14/2014     Priority: Medium     History of stroke symptoms, stroke ruled out on imaging 07/10/2014     Priority: Medium     Admissions in 2011 and 2014 for stroke-like symptoms. Evaluated by neurology on both occasions, imaging not consistent with stroke. Thought to be due to conversion disorder in 2011.       Somatization disorder 07/10/2014     Priority: Medium     Benign neoplasm of colon (POLYPOSIS) 06/04/2014     Priority: Medium     Specimen #: [id number]   Collected: 11/7/2017   Received: 11/7/2017   Reported: 11/9/2017 19:59   Ordering Phy(s): CARMEL GALLOWAY     For improved result formatting, select 'View Enhanced Report Format'   under Linked Documents section.     SPECIMEN(S):   Colon polyp, 30 cm     FINAL DIAGNOSIS:   Colon at 30 cm, mucosal biopsy:   - Hyperplastic polyp, with focal changes suggestive  of sessile serrated   adenoma.     Electronically signed out by:     Eric Rivas M.D.  = per Surveillance Recommendations:  Repeat in 5 yrs.    Notes Recorded by Moris Thomas MD on 4/25/2014 at 1:36 PM  Adenomatous polyp colonoscopy 5 years         DDD (degenerative disc disease), cervical 12/17/2013     Priority: Medium     Health Care Home 09/03/2013     Priority: Medium     Status:  Accepted  Care Coordinator:  Alla Fernandez    See Letters for [facility] Care Plan  Date:  October 20, 2014         Rosacea 08/26/2013     Priority: Medium     COPD (chronic obstructive pulmonary disease) (H) 08/23/2012     Priority: Medium     Pulmonary Function test by Sylvester Erickson MD on 9/13/2013 3:38 PM   IDENTIFICATION: Bia Bill is a 47-year-old female with obesity and a history of tobacco use.   RESULTS:   1. Spirometry: FEV1 moderately reduced. FVC moderately reduced. FEV1/FVC ratio normal.   2. Bronchodilator Response: No significant change is seen with bronchodilators.   INTERPRETATION: Moderate reduction in FEV1 and FVC with well-preserved FEV1/FVC ratio. This can be seen in the setting of obstructive lung disease with air trapping or with restrictive lung disease. Clinical correlation is needed. Further testing with lung volumes and DLCO may be useful.   SYLVESTER ERICKSON MD          Moderate mixed bipolar I disorder (H) 10/13/2011     Priority: Medium     Problem list name updated by automated process. Provider to review       Personality disorder, depressive 10/13/2011     Priority: Medium     Erythrocytosis      Priority: Medium     Smoker 04/01/2011     Priority: Medium     3/2011  Not interested in quitting at this time.   Will consider and let us know how we can be of assistance.   Understands long term risks associated with same and increased risk of blood clot formation.        GILES (Obstructive Sleep Apnea)-Moderate (AHI 16) 03/01/2010     Priority: Medium     RLS (restless legs syndrome) 03/01/2010      Priority: Medium     Ferritin 11.       Fatty liver 08/28/2009     Priority: Medium     US on 8/26/2009        Severe episode of recurrent major depressive disorder (H) 11/03/2008     Priority: Medium     Hypothyroidism 05/13/2008     Priority: Medium     was hyperthyroid - Hashimoto's and graves and had iodine treatment done at [place] in early 2008.    Now hypothyroid - is following at [facility], on 125mcg levothyroxine - see scanned documents       Esophageal reflux 04/29/2008     Priority: Medium     Developmental reading disorder 09/12/2007     Priority: Medium     Problem list name updated by automated process. Provider to review       Chondromalacia of patella 02/27/2007     Priority: Medium     SECONDARY POLYCYTHEMIA      Priority: Medium     Betheda HGB, a high-oxygen affinity hemoglobin which results in a normal compensatory erythrocytosis.  There is no specific therapy needed.  I did order a hemoglobin electrophoresis today to help confirm this in Bia, as she tells me she has never had this test performed. Dr. Riley 1/17/07       Alcohol abuse, in remission 08/01/2006     Priority: Medium     Quit 89 [address] ordered, lost her children,        Mild persistent asthma 07/11/2006     Priority: Medium     Polyneuropathy in other diseases classified elsewhere (H) 07/11/2006     Priority: Medium     Has had chronic pain in the left groin and upper leg secondary to a blood clot in the thigh, treated with neurontin without benefit, still has chronic pain since 2005, sharp shooting intermittantly.    MRI/MRA 12/07 - [place] - see scanned documents   - had mild nonspecific white matter changes  Otherwise negative     MRI c- spine [place] 12/07 - no cervical cord abn.  Small disc protrusion C6-C7, no impingement       DVT (deep venous thrombosis) (H) 07/11/2006     Priority: Medium     She had a DVT post pregnancy and delivery in 1992.   3/26/2004:Hospitalized at [facility] for extensive left lower leg DVT.   This occurred a month after pneumonia episode and decreased activity.  She had lytic therapy, tPA followed by Possis mechanical thrombectomy device and three stents in veins.  She did have Groshong catheter at the time.  There was a positive lupus anticoagulant, negative Factor V and cardiolipin clement.           Past Medical History:    Past Medical History:   Diagnosis Date     Acromioclavicular joint arthritis 1/25/2017     Acute bronchospasm 8/15/2016     Acute gouty arthritis 8/4/2014     Anxiety state, unspecified      Bipolar I disorder, most recent episode (or current) unspecified      Closed fracture of metatarsal bone 6/5/2007     Depressive disorder, not elsewhere classified      DVT, lower extremity (H)      Headache 10/17/2014     Impingement syndrome of shoulder region 1/25/2017     Polycythemia, secondary      Right shoulder pain 10/14/2014     Trochanteric bursitis of both hips 1/11/2016       Past Surgical History:    Past Surgical History:   Procedure Laterality Date     BONE MARROW BIOPSY, BONE SPECIMEN, NEEDLE/TROCAR N/A 11/17/2014    Procedure: BIOPSY BONE MARROW;  Surgeon: Hay Aaron MD;  Location: [facility]     D & C  10/26/09    with uterine ablation     ENDOVASCULAR PLACEMENT VASCULAR DEVICE Left     Left leg stent x 3     ESOPHAGOSCOPY, GASTROSCOPY, DUODENOSCOPY (EGD), COMBINED  4/21/2014    Procedure: Gastroscopy;  Surgeon: Moris Thomas MD;  Location: [facility]     HYSTERECTOMY, PAP NO LONGER INDICATED  1-4-2010     LAPAROSCOPIC CHOLECYSTECTOMY N/A 1/4/2019    Procedure: Laparoscopic cholecystectomy;  Surgeon: Aaron Siegel MD;  Location: [facility]     LITHOTRIPSY  2004    Lithotrypsy       Family History:    Family History   Problem Relation Age of Onset     Hypertension Mother      Diabetes Mother      Blood Disease Mother      Heart Disease Mother         chf     Cerebrovascular Disease Mother      Hypertension Father      Cancer Father         lung cancer     Allergies  Sister      Diabetes Sister      Depression Sister      Hypertension Sister        Social History:  Marital Status:   [4]  Social History     Tobacco Use     Smoking status: Current Every Day Smoker     Packs/day: 0.50     Years: 43.00     Pack years: 21.50     Types: Cigarettes     Smokeless tobacco: Never Used     Tobacco comment: started at age 11.  Quit during pregnancyX4.  Quit 3 months ago.    Substance Use Topics     Alcohol use: No     Alcohol/week: 0.0 standard drinks     Comment: quit 1995     Drug use: No     Comment: past hx 22 years ago/ uppers downers, canibus        Medications:         acetaminophen (TYLENOL) 500 MG tablet       ADVAIR -21 MCG/ACT inhaler       albuterol (PROAIR HFA/PROVENTIL HFA/VENTOLIN HFA) 108 (90 Base) MCG/ACT inhaler       albuterol (PROVENTIL) (2.5 MG/3ML) 0.083% neb solution       alcohol swab prep pads       allopurinol (ZYLOPRIM) 300 MG tablet       ARIPiprazole (ABILIFY) 10 MG tablet       blood glucose (NO BRAND SPECIFIED) test strip       blood glucose calibration (NO BRAND SPECIFIED) solution       blood glucose monitoring (NO BRAND SPECIFIED) meter device kit       buPROPion (WELLBUTRIN XL) 150 MG 24 hr tablet       EPINEPHrine (ANY BX GENERIC EQUIV) 0.3 MG/0.3ML injection 2-pack       furosemide (LASIX) 40 MG tablet       gabapentin (NEURONTIN) 300 MG capsule       gabapentin (NEURONTIN) 600 MG tablet       ipratropium - albuterol 0.5 mg/2.5 mg/3 mL (DUONEB) 0.5-2.5 (3) MG/3ML neb solution       lamoTRIgine (LAMICTAL) 100 MG tablet       levothyroxine (SYNTHROID/LEVOTHROID) 175 MCG tablet       metFORMIN (GLUCOPHAGE) 1000 MG tablet       mometasone-formoterol (DULERA) 200-5 MCG/ACT oral inhaler       omeprazole (PRILOSEC) 20 MG DR capsule       order for DME       order for DME       order for DME       order for DME       order for DME       potassium chloride ER (KLOR-CON M) 10 MEQ CR tablet       pramipexole (MIRAPEX) 0.125 MG tablet        "simvastatin (ZOCOR) 20 MG tablet       thin (NO BRAND SPECIFIED) lancets       traZODone (DESYREL) 150 MG tablet       trimethoprim-polymyxin b (POLYTRIM) 62587-1.1 UNIT/ML-% ophthalmic solution       VITAMIN D3 25 MCG (1000 UT) tablet       warfarin ANTICOAGULANT (JANTOVEN ANTICOAGULANT) 5 MG tablet      Review of Systems  As mentioned above in the history present illness. All other systems were reviewed and are negative.    Physical Exam   BP: 112/76  Pulse: 76  Temp: 98.5  F (36.9  C)  Resp: 16  Height: 147.3 cm (4' 10\")  Weight: 109.8 kg (242 lb)  SpO2: 94 %      Physical Exam  Vitals signs and nursing note reviewed.   Constitutional:       General: She is not in acute distress.     Appearance: She is well-developed. She is not diaphoretic.   HENT:      Head: Normocephalic and atraumatic.      Right Ear: Hearing, tympanic membrane, ear canal and external ear normal.      Left Ear: Hearing, tympanic membrane, ear canal and external ear normal.      Nose: Nose normal.      Mouth/Throat:      Pharynx: Uvula midline.      Tonsils: No tonsillar exudate.   Eyes:      General: No scleral icterus.        Right eye: No discharge.         Left eye: No discharge.      Conjunctiva/sclera: Conjunctivae normal.   Neck:      Musculoskeletal: Normal range of motion and neck supple.   Cardiovascular:      Rate and Rhythm: Normal rate and regular rhythm.      Heart sounds: Normal heart sounds. No murmur. No friction rub.   Pulmonary:      Effort: Pulmonary effort is normal. No respiratory distress.      Breath sounds: Normal breath sounds. No stridor. No wheezing or rales.   Chest:      Chest wall: No tenderness.   Abdominal:      General: Abdomen is protuberant. Bowel sounds are normal. There is no distension.      Palpations: Abdomen is soft. There is no mass.      Tenderness: There is generalized abdominal tenderness. There is no guarding or rebound.      Hernia: No hernia is present.      Comments: Erythema of the abdominal " wall with indistinct borders     Skin:     General: Skin is warm and dry.      Coloration: Skin is not pale.      Findings: No erythema or rash.   Neurological:      Mental Status: She is alert and oriented to person, place, and time.      Deep Tendon Reflexes: Reflexes normal.         ED Course     ED Course as of Nov 06 0022   Thu Nov 05, 2020   2250 Notified by staff nursing that patient is soundly sleeping.  Per nursing staff patient does have a history of this with previous emergency department episodes.  Nurse reports unable to rouse patient.  Examination of patient reveals pinpoint pupils, sonorous breathing, O2 saturation 89%, blood pressure stable, palpation of the patient's abdomen where patient previously had reported pain level of 13 out of 10 unable to elicit pain.  Unable to get patient to respond.  Patient unresponsive to a sternal rub.  Ordered Narcan.  Consulted with Dr. Bartolome Elliott who came and evaluated patient as well.  Blood sugar obtained and is 165.  Patient responsive to Dr. Bartolome Elloitt's sternal rub.  Ordered urine cath and patient to go over for CT scan.  Patient now alert and orientated and speech is clear presently.        Procedures    Results for orders placed or performed during the hospital encounter of 11/05/20 (from the past 24 hour(s))   CBC with platelets differential   Result Value Ref Range    WBC 6.0 4.0 - 11.0 10e9/L    RBC Count 5.13 3.8 - 5.2 10e12/L    Hemoglobin 11.7 11.7 - 15.7 g/dL    Hematocrit 42.2 35.0 - 47.0 %    MCV 82 78 - 100 fl    MCH 22.8 (L) 26.5 - 33.0 pg    MCHC 27.7 (L) 31.5 - 36.5 g/dL    RDW 20.4 (H) 10.0 - 15.0 %    Platelet Count 102 (L) 150 - 450 10e9/L    Diff Method Automated Method     % Neutrophils 69.6 %    % Lymphocytes 21.2 %    % Monocytes 7.0 %    % Eosinophils 1.3 %    % Basophils 0.7 %    % Immature Granulocytes 0.2 %    Nucleated RBCs 0 0 /100    Absolute Neutrophil 4.2 1.6 - 8.3 10e9/L    Absolute Lymphocytes 1.3 0.8 - 5.3 10e9/L    Absolute  Monocytes 0.4 0.0 - 1.3 10e9/L    Absolute Eosinophils 0.1 0.0 - 0.7 10e9/L    Absolute Basophils 0.0 0.0 - 0.2 10e9/L    Abs Immature Granulocytes 0.0 0 - 0.4 10e9/L    Absolute Nucleated RBC 0.0    Basic metabolic panel   Result Value Ref Range    Sodium 143 133 - 144 mmol/L    Potassium 3.5 3.4 - 5.3 mmol/L    Chloride 104 94 - 109 mmol/L    Carbon Dioxide 35 (H) 20 - 32 mmol/L    Anion Gap 4 3 - 14 mmol/L    Glucose 89 70 - 99 mg/dL    Urea Nitrogen 12 7 - 30 mg/dL    Creatinine 0.68 0.52 - 1.04 mg/dL    GFR Estimate >90 >60 mL/min/[1.73_m2]    GFR Estimate If Black >90 >60 mL/min/[1.73_m2]    Calcium 9.1 8.5 - 10.1 mg/dL   Lactic acid whole blood   Result Value Ref Range    Lactic Acid 1.3 0.7 - 2.0 mmol/L   Lipase   Result Value Ref Range    Lipase 70 (L) 73 - 393 U/L   Hepatic panel   Result Value Ref Range    Bilirubin Direct 0.2 0.0 - 0.2 mg/dL    Bilirubin Total 0.6 0.2 - 1.3 mg/dL    Albumin 3.4 3.4 - 5.0 g/dL    Protein Total 6.2 (L) 6.8 - 8.8 g/dL    Alkaline Phosphatase 117 40 - 150 U/L    ALT 17 0 - 50 U/L    AST 13 0 - 45 U/L   INR   Result Value Ref Range    INR 2.48 (H) 0.86 - 1.14   Glucose by meter   Result Value Ref Range    Glucose 165 (H) 70 - 99 mg/dL   UA reflex to Microscopic   Result Value Ref Range    Color Urine Yellow     Appearance Urine Clear     Glucose Urine Negative NEG^Negative mg/dL    Bilirubin Urine Negative NEG^Negative    Ketones Urine Negative NEG^Negative mg/dL    Specific Gravity Urine 1.009 1.003 - 1.035    Blood Urine Negative NEG^Negative    pH Urine 7.0 5.0 - 7.0 pH    Protein Albumin Urine 100 (A) NEG^Negative mg/dL    Urobilinogen mg/dL 0.0 0.0 - 2.0 mg/dL    Nitrite Urine Negative NEG^Negative    Leukocyte Esterase Urine Negative NEG^Negative    Source Catheterized Urine     RBC Urine 1 0 - 2 /HPF    WBC Urine 1 0 - 5 /HPF    Squamous Epithelial /HPF Urine <1 0 - 1 /HPF    Mucous Urine Present (A) NEG^Negative /LPF    Hyaline Casts 10 (H) 0 - 2 /LPF   CT Abdomen  Pelvis w Contrast    Narrative    EXAM: CT ABDOMEN PELVIS W CONTRAST  [facility]ATION: [facility]  DATE/TIME: 11/5/2020 10:53 PM    INDICATION: Abd distension  Abd infection (incl peritonitis)  COMPARISON: None.  TECHNIQUE: CT scan of the abdomen and pelvis was performed following injection of IV contrast. Multiplanar reformats were obtained. Dose reduction techniques were used.  CONTRAST: 100mL Isovue-370    FINDINGS:   LOWER CHEST: Linear discoid atelectasis or scar at each lung base. Cardiomegaly.    HEPATOBILIARY: Prior cholecystectomy. Slightly mottled congested appearance of liver on earliest scan with normal appearance on delayed imaging suggestive of some degree of CHF.    PANCREAS: Normal.    SPLEEN: Normal.    ADRENAL GLANDS: Normal.    KIDNEYS/BLADDER: 3 mm right lower pole stone. 8mm presumed cyst upper pole right kidney. No hydronephrosis    BOWEL: No obstruction or inflammatory change. Appendix normal.    LYMPH NODES: Small retroperitoneal lymph nodes within normal limits.    VASCULATURE: Metallic stent left common iliac vein appears patent.    PELVIC ORGANS: Hysterectomy. No adnexal lesions. Minimal volume ascites.    MUSCULOSKELETAL: Bony structures unremarkable. There is very prominent diffuse soft tissue stranding throughout subcutaneous spaces of abdomen and pelvis consistent with anasarca.      Impression    IMPRESSION:   1.  There is no inflammatory peritoneal process identified, bowel unremarkable.  2.  There is a somewhat congested appearance of liver likely relating to cardiac dysfunction.  3.  Tiny volume ascites and pronounced anasarca also consistent with third spacing/cardiac disease.  4.  3 mm right lower pole kidney stone.       *Note: Due to a large number of results and/or encounters for the requested time period, some results have not been displayed. A complete set of results can be found in Results Review.       Medications   lactated ringers BOLUS 1,000 mL (0 mLs Intravenous  Stopped 11/5/20 2325)     Followed by   lactated ringers BOLUS 1,000 mL (1,000 mLs Intravenous New Bag 11/5/20 2307)     Followed by   lactated ringers infusion (has no administration in time range)   HYDROmorphone (PF) (DILAUDID) injection 0.5 mg (0.5 mg Intravenous Given 11/5/20 2046)   ondansetron (ZOFRAN) injection 4 mg (4 mg Intravenous Given 11/5/20 2047)   naloxone (NARCAN) injection 0.4 mg (has no administration in time range)   iopamidol (ISOVUE-370) solution 100 mL (100 mLs Intravenous Given 11/5/20 2351)   sodium chloride 0.9 % bag 500mL for CT scan flush use (59 mLs As instructed Given 11/5/20 2352)   naloxone (NARCAN) 0.4 MG/ML injection (0.4 mg  Given 11/5/20 2306)       Assessments & Plan (with Medical Decision Making)  Bia Bill is a 54 year old female with past medical history of anxiety, depression, bipolar disorder, DVT on chronic anticoagulation, type 2 diabetes, fatty liver, history of alcohol disorder recovered for 20 years, tobacco abuse who presents to the emergency room with persistent abdominal pain and questionably worsening abdominal wall cellulitis.  Patient seen on October 29 and diagnosed with abdominal wall cellulitis and started on Keflex 500 mg p.o. 4 times daily for 7 days.  Patient followed up with Dr. Smith, primary care provider yesterday and due to concern of worsening symptoms was sent to the emergency room here for further evaluation.  On exam today patient is vitally normal.  CBC completed and is unremarkable, comprehensive metabolic panel is unremarkable no sign of sepsis or endorgan damage.  Lipase is obtained and negative no sign of pancreatitis, hepatic panel obtained no sign of acute hepatitis or cirrhosis or transaminitis.  CT of the abdomen obtained with contrast and no sign of abdominal wall abscess.  Persistent edema noted consistent with the erythema noted on the abdominal wall.  At this point patient is responding to therapy and will initiate Keflex  refill of 500 mg p.o. 4 times daily for another additional 7 days and follow-up with primary care early next week for another recheck.  After receiving Dilaudid patient reports that she is feeling better with her pain.  Patient discharged in stable condition.     I have reviewed the nursing notes.    I have reviewed the findings, diagnosis, plan and need for follow up with the patient.    New Prescriptions    No medications on file       Final diagnoses:   Abdominal wall cellulitis       11/5/2020   [facility] EMERGENCY DEPT     Ayanna Whitley, APRN CNP  11/06/20 0023

## 2020-11-06 NOTE — ED NOTES
Pt presents to the ED with complaints of generalized abdominal pain that has gotten much worse recently. Pt has been nauseated. Last BM today. Reports blood in stool. Pt also abdominal distention.

## 2020-11-06 NOTE — ED NOTES
MD Elliott at bedside, sternal rub and patient now more awake. No change following narcan, but now more awake. Oriented to situation and place, denies pain. Asked patient if she could void and declined. Offered straight cath. Agreeable. Straight cath placed and returned 250cc yellow urine. Urine sent for analysis.

## 2020-11-07 LAB
BACTERIA SPEC CULT: NO GROWTH
SPECIMEN SOURCE: NORMAL

## 2020-11-07 NOTE — RESULT ENCOUNTER NOTE
Final urine culture report is NEGATIVE per [place] [facility] Lab Result protocol.    If NEGATIVE result, no change in treatment, per [place] [facility] Lab Result protocol.

## 2020-11-08 ENCOUNTER — HOSPITAL ENCOUNTER (INPATIENT)
Facility: CLINIC | Age: 55
LOS: 3 days | Discharge: HOME | End: 2020-11-11
Attending: EMERGENCY MEDICINE | Admitting: INTERNAL MEDICINE
Payer: COMMERCIAL

## 2020-11-08 ENCOUNTER — APPOINTMENT (OUTPATIENT)
Dept: ULTRASOUND IMAGING | Facility: CLINIC | Age: 55
End: 2020-11-08
Attending: EMERGENCY MEDICINE
Payer: COMMERCIAL

## 2020-11-08 ENCOUNTER — APPOINTMENT (OUTPATIENT)
Dept: GENERAL RADIOLOGY | Facility: CLINIC | Age: 55
End: 2020-11-08
Attending: EMERGENCY MEDICINE
Payer: COMMERCIAL

## 2020-11-08 DIAGNOSIS — Z20.828 EXPOSURE TO SARS-ASSOCIATED CORONAVIRUS: ICD-10-CM

## 2020-11-08 DIAGNOSIS — E66.2 HYPOVENTILATION ASSOCIATED WITH OBESITY SYNDROME (H): ICD-10-CM

## 2020-11-08 DIAGNOSIS — I50.9 ACUTE CONGESTIVE HEART FAILURE, UNSPECIFIED HEART FAILURE TYPE (H): ICD-10-CM

## 2020-11-08 DIAGNOSIS — G47.33 OSA (OBSTRUCTIVE SLEEP APNEA): Primary | Chronic | ICD-10-CM

## 2020-11-08 DIAGNOSIS — E66.2 OBESITY HYPOVENTILATION SYNDROME (H): ICD-10-CM

## 2020-11-08 DIAGNOSIS — I11.0 HYPERTENSIVE HEART DISEASE WITH CONGESTIVE HEART FAILURE, UNSPECIFIED HEART FAILURE TYPE (H): ICD-10-CM

## 2020-11-08 PROBLEM — E11.9 TYPE 2 DIABETES MELLITUS (H): Chronic | Status: ACTIVE | Noted: 2019-12-10

## 2020-11-08 PROBLEM — M1A.9XX0 CHRONIC GOUT WITHOUT TOPHUS: Chronic | Status: ACTIVE | Noted: 2018-10-03

## 2020-11-08 PROBLEM — M1A.3490: Chronic | Status: ACTIVE | Noted: 2018-10-03

## 2020-11-08 PROBLEM — R79.89 ELEVATED TROPONIN: Status: ACTIVE | Noted: 2020-11-08

## 2020-11-08 PROBLEM — D69.6 THROMBOCYTOPENIA (H): Chronic | Status: ACTIVE | Noted: 2020-06-22

## 2020-11-08 PROBLEM — E78.5 HYPERLIPIDEMIA: Chronic | Status: ACTIVE | Noted: 2020-11-08

## 2020-11-08 PROBLEM — J44.1 COPD EXACERBATION (H): Status: RESOLVED | Noted: 2019-10-29 | Resolved: 2020-11-08

## 2020-11-08 PROBLEM — I89.0 LYMPHEDEMA OF BOTH LOWER EXTREMITIES: Chronic | Status: ACTIVE | Noted: 2019-10-29

## 2020-11-08 PROBLEM — J96.12 CHRONIC RESPIRATORY FAILURE WITH HYPERCAPNIA (H): Chronic | Status: ACTIVE | Noted: 2020-11-08

## 2020-11-08 LAB
ALBUMIN SERPL-MCNC: 3.1 G/DL (ref 3.4–5)
ALP SERPL-CCNC: 104 U/L (ref 40–150)
ALT SERPL W P-5'-P-CCNC: 19 U/L (ref 0–50)
ANION GAP SERPL CALCULATED.3IONS-SCNC: 6 MMOL/L (ref 3–14)
AST SERPL W P-5'-P-CCNC: 17 U/L (ref 0–45)
BASOPHILS # BLD AUTO: 0 10E9/L (ref 0–0.2)
BASOPHILS NFR BLD AUTO: 0.6 %
BILIRUB SERPL-MCNC: 0.6 MG/DL (ref 0.2–1.3)
BUN SERPL-MCNC: 17 MG/DL (ref 7–30)
CALCIUM SERPL-MCNC: 8.4 MG/DL (ref 8.5–10.1)
CHLORIDE SERPL-SCNC: 104 MMOL/L (ref 94–109)
CO2 SERPL-SCNC: 30 MMOL/L (ref 20–32)
CREAT SERPL-MCNC: 0.86 MG/DL (ref 0.52–1.04)
DIFFERENTIAL METHOD BLD: ABNORMAL
EOSINOPHIL # BLD AUTO: 0.1 10E9/L (ref 0–0.7)
EOSINOPHIL NFR BLD AUTO: 0.9 %
ERYTHROCYTE [DISTWIDTH] IN BLOOD BY AUTOMATED COUNT: 20.1 % (ref 10–15)
GFR SERPL CREATININE-BSD FRML MDRD: 77 ML/MIN/{1.73_M2}
GLUCOSE BLDC GLUCOMTR-MCNC: 153 MG/DL (ref 70–99)
GLUCOSE BLDC GLUCOMTR-MCNC: 92 MG/DL (ref 70–99)
GLUCOSE SERPL-MCNC: 156 MG/DL (ref 70–99)
HBA1C MFR BLD: 5.6 % (ref 0–5.6)
HCO3 BLDV-SCNC: 35 MMOL/L (ref 21–28)
HCT VFR BLD AUTO: 40.5 % (ref 35–47)
HGB BLD-MCNC: 11.3 G/DL (ref 11.7–15.7)
IMM GRANULOCYTES # BLD: 0 10E9/L (ref 0–0.4)
IMM GRANULOCYTES NFR BLD: 0.5 %
INR PPP: 1.32 (ref 0.86–1.14)
LACTATE BLD-SCNC: 1.7 MMOL/L (ref 0.7–2)
LYMPHOCYTES # BLD AUTO: 1.4 10E9/L (ref 0.8–5.3)
LYMPHOCYTES NFR BLD AUTO: 21.2 %
MCH RBC QN AUTO: 23 PG (ref 26.5–33)
MCHC RBC AUTO-ENTMCNC: 27.9 G/DL (ref 31.5–36.5)
MCV RBC AUTO: 82 FL (ref 78–100)
MONOCYTES # BLD AUTO: 0.5 10E9/L (ref 0–1.3)
MONOCYTES NFR BLD AUTO: 7.9 %
NEUTROPHILS # BLD AUTO: 4.6 10E9/L (ref 1.6–8.3)
NEUTROPHILS NFR BLD AUTO: 68.9 %
NRBC # BLD AUTO: 0 10*3/UL
NRBC BLD AUTO-RTO: 0 /100
NT-PROBNP SERPL-MCNC: 1868 PG/ML (ref 0–900)
O2/TOTAL GAS SETTING VFR VENT: ABNORMAL %
PCO2 BLDV: 61 MM HG (ref 40–50)
PH BLDV: 7.36 PH (ref 7.32–7.43)
PLATELET # BLD AUTO: 120 10E9/L (ref 150–450)
PO2 BLDV: 16 MM HG (ref 25–47)
POTASSIUM SERPL-SCNC: 3.6 MMOL/L (ref 3.4–5.3)
PROT SERPL-MCNC: 5.9 G/DL (ref 6.8–8.8)
RBC # BLD AUTO: 4.92 10E12/L (ref 3.8–5.2)
SARS-COV-2 RNA SPEC QL NAA+PROBE: NOT DETECTED
SODIUM SERPL-SCNC: 140 MMOL/L (ref 133–144)
SPECIMEN SOURCE: NORMAL
TROPONIN I SERPL-MCNC: 0.2 UG/L (ref 0–0.04)
TROPONIN I SERPL-MCNC: 0.2 UG/L (ref 0–0.04)
WBC # BLD AUTO: 6.6 10E9/L (ref 4–11)

## 2020-11-08 PROCEDURE — 250N000013 HC RX MED GY IP 250 OP 250 PS 637: Performed by: EMERGENCY MEDICINE

## 2020-11-08 PROCEDURE — 250N000011 HC RX IP 250 OP 636: Performed by: EMERGENCY MEDICINE

## 2020-11-08 PROCEDURE — 120N000001 HC R&B MED SURG/OB

## 2020-11-08 PROCEDURE — 82803 BLOOD GASES ANY COMBINATION: CPT | Performed by: EMERGENCY MEDICINE

## 2020-11-08 PROCEDURE — 83036 HEMOGLOBIN GLYCOSYLATED A1C: CPT | Performed by: INTERNAL MEDICINE

## 2020-11-08 PROCEDURE — 85025 COMPLETE CBC W/AUTO DIFF WBC: CPT | Performed by: EMERGENCY MEDICINE

## 2020-11-08 PROCEDURE — 83880 ASSAY OF NATRIURETIC PEPTIDE: CPT | Performed by: EMERGENCY MEDICINE

## 2020-11-08 PROCEDURE — 250N000013 HC RX MED GY IP 250 OP 250 PS 637: Performed by: INTERNAL MEDICINE

## 2020-11-08 PROCEDURE — C9803 HOPD COVID-19 SPEC COLLECT: HCPCS | Performed by: EMERGENCY MEDICINE

## 2020-11-08 PROCEDURE — 250N000011 HC RX IP 250 OP 636: Performed by: INTERNAL MEDICINE

## 2020-11-08 PROCEDURE — 999N001017 HC STATISTIC GLUCOSE BY METER IP

## 2020-11-08 PROCEDURE — 93005 ELECTROCARDIOGRAM TRACING: CPT | Performed by: EMERGENCY MEDICINE

## 2020-11-08 PROCEDURE — 99285 EMERGENCY DEPT VISIT HI MDM: CPT | Mod: 25 | Performed by: EMERGENCY MEDICINE

## 2020-11-08 PROCEDURE — 80053 COMPREHEN METABOLIC PANEL: CPT | Performed by: EMERGENCY MEDICINE

## 2020-11-08 PROCEDURE — U0003 INFECTIOUS AGENT DETECTION BY NUCLEIC ACID (DNA OR RNA); SEVERE ACUTE RESPIRATORY SYNDROME CORONAVIRUS 2 (SARS-COV-2) (CORONAVIRUS DISEASE [COVID-19]), AMPLIFIED PROBE TECHNIQUE, MAKING USE OF HIGH THROUGHPUT TECHNOLOGIES AS DESCRIBED BY CMS-2020-01-R: HCPCS | Performed by: EMERGENCY MEDICINE

## 2020-11-08 PROCEDURE — 99223 1ST HOSP IP/OBS HIGH 75: CPT | Mod: AI | Performed by: INTERNAL MEDICINE

## 2020-11-08 PROCEDURE — 71045 X-RAY EXAM CHEST 1 VIEW: CPT

## 2020-11-08 PROCEDURE — 93010 ELECTROCARDIOGRAM REPORT: CPT | Performed by: EMERGENCY MEDICINE

## 2020-11-08 PROCEDURE — 96376 TX/PRO/DX INJ SAME DRUG ADON: CPT | Performed by: EMERGENCY MEDICINE

## 2020-11-08 PROCEDURE — 96374 THER/PROPH/DIAG INJ IV PUSH: CPT | Mod: 59 | Performed by: EMERGENCY MEDICINE

## 2020-11-08 PROCEDURE — 84484 ASSAY OF TROPONIN QUANT: CPT | Performed by: EMERGENCY MEDICINE

## 2020-11-08 PROCEDURE — 93970 EXTREMITY STUDY: CPT

## 2020-11-08 PROCEDURE — 85610 PROTHROMBIN TIME: CPT | Performed by: EMERGENCY MEDICINE

## 2020-11-08 PROCEDURE — 96375 TX/PRO/DX INJ NEW DRUG ADDON: CPT | Performed by: EMERGENCY MEDICINE

## 2020-11-08 PROCEDURE — 83605 ASSAY OF LACTIC ACID: CPT | Performed by: EMERGENCY MEDICINE

## 2020-11-08 RX ORDER — IOPAMIDOL 755 MG/ML
100 INJECTION, SOLUTION INTRAVASCULAR ONCE
Status: DISCONTINUED | OUTPATIENT
Start: 2020-11-08 | End: 2020-11-09 | Stop reason: CLARIF

## 2020-11-08 RX ORDER — FUROSEMIDE 10 MG/ML
40 INJECTION INTRAMUSCULAR; INTRAVENOUS ONCE
Status: COMPLETED | OUTPATIENT
Start: 2020-11-08 | End: 2020-11-08

## 2020-11-08 RX ORDER — ARIPIPRAZOLE 10 MG/1
10 TABLET ORAL DAILY
Status: DISCONTINUED | OUTPATIENT
Start: 2020-11-08 | End: 2020-11-11 | Stop reason: HOSPADM

## 2020-11-08 RX ORDER — ONDANSETRON 2 MG/ML
4 INJECTION INTRAMUSCULAR; INTRAVENOUS EVERY 6 HOURS PRN
Status: DISCONTINUED | OUTPATIENT
Start: 2020-11-08 | End: 2020-11-11 | Stop reason: HOSPADM

## 2020-11-08 RX ORDER — BUPROPION HYDROCHLORIDE 150 MG/1
150 TABLET ORAL EVERY EVENING
Status: DISCONTINUED | OUTPATIENT
Start: 2020-11-08 | End: 2020-11-11 | Stop reason: HOSPADM

## 2020-11-08 RX ORDER — POTASSIUM CHLORIDE 1500 MG/1
20 TABLET, EXTENDED RELEASE ORAL ONCE
Status: COMPLETED | OUTPATIENT
Start: 2020-11-08 | End: 2020-11-08

## 2020-11-08 RX ORDER — GABAPENTIN 600 MG/1
600 TABLET ORAL 3 TIMES DAILY
Status: DISCONTINUED | OUTPATIENT
Start: 2020-11-08 | End: 2020-11-11 | Stop reason: HOSPADM

## 2020-11-08 RX ORDER — DEXTROSE MONOHYDRATE 25 G/50ML
25-50 INJECTION, SOLUTION INTRAVENOUS
Status: DISCONTINUED | OUTPATIENT
Start: 2020-11-08 | End: 2020-11-11 | Stop reason: HOSPADM

## 2020-11-08 RX ORDER — SIMVASTATIN 20 MG
20 TABLET ORAL AT BEDTIME
Status: DISCONTINUED | OUTPATIENT
Start: 2020-11-08 | End: 2020-11-11 | Stop reason: HOSPADM

## 2020-11-08 RX ORDER — HYDROCODONE BITARTRATE AND ACETAMINOPHEN 5; 325 MG/1; MG/1
1-2 TABLET ORAL EVERY 4 HOURS PRN
Status: DISCONTINUED | OUTPATIENT
Start: 2020-11-08 | End: 2020-11-11 | Stop reason: HOSPADM

## 2020-11-08 RX ORDER — ONDANSETRON 2 MG/ML
4 INJECTION INTRAMUSCULAR; INTRAVENOUS EVERY 6 HOURS PRN
Status: DISCONTINUED | OUTPATIENT
Start: 2020-11-08 | End: 2020-11-08

## 2020-11-08 RX ORDER — DEXAMETHASONE SODIUM PHOSPHATE 10 MG/ML
10 INJECTION, SOLUTION INTRAMUSCULAR; INTRAVENOUS ONCE
Status: COMPLETED | OUTPATIENT
Start: 2020-11-08 | End: 2020-11-08

## 2020-11-08 RX ORDER — FUROSEMIDE 10 MG/ML
80 INJECTION INTRAMUSCULAR; INTRAVENOUS ONCE
Status: COMPLETED | OUTPATIENT
Start: 2020-11-08 | End: 2020-11-08

## 2020-11-08 RX ORDER — ACETAMINOPHEN 325 MG/1
650 TABLET ORAL EVERY 4 HOURS PRN
Status: DISCONTINUED | OUTPATIENT
Start: 2020-11-08 | End: 2020-11-11 | Stop reason: HOSPADM

## 2020-11-08 RX ORDER — HYDROMORPHONE HYDROCHLORIDE 1 MG/ML
0.5 INJECTION, SOLUTION INTRAMUSCULAR; INTRAVENOUS; SUBCUTANEOUS
Status: DISCONTINUED | OUTPATIENT
Start: 2020-11-08 | End: 2020-11-09 | Stop reason: ALTCHOICE

## 2020-11-08 RX ORDER — ALLOPURINOL 300 MG/1
300 TABLET ORAL DAILY
Status: DISCONTINUED | OUTPATIENT
Start: 2020-11-08 | End: 2020-11-11 | Stop reason: HOSPADM

## 2020-11-08 RX ORDER — ONDANSETRON 4 MG/1
4 TABLET, ORALLY DISINTEGRATING ORAL EVERY 6 HOURS PRN
Status: DISCONTINUED | OUTPATIENT
Start: 2020-11-08 | End: 2020-11-11 | Stop reason: HOSPADM

## 2020-11-08 RX ORDER — ONDANSETRON 4 MG/1
4 TABLET, ORALLY DISINTEGRATING ORAL EVERY 6 HOURS PRN
Status: DISCONTINUED | OUTPATIENT
Start: 2020-11-08 | End: 2020-11-08

## 2020-11-08 RX ORDER — AMOXICILLIN 250 MG
1 CAPSULE ORAL 2 TIMES DAILY
Status: DISCONTINUED | OUTPATIENT
Start: 2020-11-08 | End: 2020-11-11 | Stop reason: HOSPADM

## 2020-11-08 RX ORDER — NICOTINE POLACRILEX 4 MG
15-30 LOZENGE BUCCAL
Status: DISCONTINUED | OUTPATIENT
Start: 2020-11-08 | End: 2020-11-11 | Stop reason: HOSPADM

## 2020-11-08 RX ORDER — LAMOTRIGINE 100 MG/1
100 TABLET ORAL EVERY EVENING
Status: DISCONTINUED | OUTPATIENT
Start: 2020-11-08 | End: 2020-11-11 | Stop reason: HOSPADM

## 2020-11-08 RX ORDER — LIDOCAINE 40 MG/G
CREAM TOPICAL
Status: DISCONTINUED | OUTPATIENT
Start: 2020-11-08 | End: 2020-11-11 | Stop reason: HOSPADM

## 2020-11-08 RX ORDER — NITROGLYCERIN 0.4 MG/1
0.4 TABLET SUBLINGUAL EVERY 5 MIN PRN
Status: DISCONTINUED | OUTPATIENT
Start: 2020-11-08 | End: 2020-11-11 | Stop reason: HOSPADM

## 2020-11-08 RX ORDER — NALOXONE HYDROCHLORIDE 0.4 MG/ML
.1-.4 INJECTION, SOLUTION INTRAMUSCULAR; INTRAVENOUS; SUBCUTANEOUS
Status: DISCONTINUED | OUTPATIENT
Start: 2020-11-08 | End: 2020-11-11 | Stop reason: HOSPADM

## 2020-11-08 RX ORDER — PRAMIPEXOLE DIHYDROCHLORIDE 0.12 MG/1
.125-.25 TABLET ORAL
Status: DISCONTINUED | OUTPATIENT
Start: 2020-11-08 | End: 2020-11-11 | Stop reason: HOSPADM

## 2020-11-08 RX ORDER — ALBUTEROL SULFATE 90 UG/1
2 AEROSOL, METERED RESPIRATORY (INHALATION) EVERY 6 HOURS PRN
Status: DISCONTINUED | OUTPATIENT
Start: 2020-11-08 | End: 2020-11-11 | Stop reason: HOSPADM

## 2020-11-08 RX ORDER — POLYETHYLENE GLYCOL 3350 17 G/17G
17 POWDER, FOR SOLUTION ORAL DAILY
Status: DISCONTINUED | OUTPATIENT
Start: 2020-11-08 | End: 2020-11-11 | Stop reason: HOSPADM

## 2020-11-08 RX ORDER — AMOXICILLIN 250 MG
2 CAPSULE ORAL 2 TIMES DAILY
Status: DISCONTINUED | OUTPATIENT
Start: 2020-11-08 | End: 2020-11-11 | Stop reason: HOSPADM

## 2020-11-08 RX ORDER — POLYETHYLENE GLYCOL 3350 17 G/17G
17 POWDER, FOR SOLUTION ORAL DAILY PRN
Status: DISCONTINUED | OUTPATIENT
Start: 2020-11-08 | End: 2020-11-11 | Stop reason: HOSPADM

## 2020-11-08 RX ADMIN — POTASSIUM CHLORIDE 20 MEQ: 20 TABLET, EXTENDED RELEASE ORAL at 04:30

## 2020-11-08 RX ADMIN — HYDROMORPHONE HYDROCHLORIDE 0.5 MG: 1 INJECTION, SOLUTION INTRAMUSCULAR; INTRAVENOUS; SUBCUTANEOUS at 02:51

## 2020-11-08 RX ADMIN — FUROSEMIDE 80 MG: 10 INJECTION, SOLUTION INTRAMUSCULAR; INTRAVENOUS at 14:19

## 2020-11-08 RX ADMIN — DOCUSATE SODIUM AND SENNOSIDES 1 TABLET: 8.6; 5 TABLET ORAL at 21:25

## 2020-11-08 RX ADMIN — ARIPIPRAZOLE 10 MG: 10 TABLET ORAL at 17:28

## 2020-11-08 RX ADMIN — FUROSEMIDE 40 MG: 10 INJECTION, SOLUTION INTRAMUSCULAR; INTRAVENOUS at 06:23

## 2020-11-08 RX ADMIN — FUROSEMIDE 40 MG: 10 INJECTION, SOLUTION INTRAMUSCULAR; INTRAVENOUS at 02:46

## 2020-11-08 RX ADMIN — POLYETHYLENE GLYCOL 3350 17 G: 17 POWDER, FOR SOLUTION ORAL at 17:22

## 2020-11-08 RX ADMIN — LAMOTRIGINE 100 MG: 100 TABLET ORAL at 17:28

## 2020-11-08 RX ADMIN — HYDROCODONE BITARTRATE AND ACETAMINOPHEN 1 TABLET: 5; 325 TABLET ORAL at 21:26

## 2020-11-08 RX ADMIN — NITROGLYCERIN 0.4 MG: 0.4 TABLET SUBLINGUAL at 02:48

## 2020-11-08 RX ADMIN — LEVOTHYROXINE SODIUM 175 MCG: 0.15 TABLET ORAL at 17:23

## 2020-11-08 RX ADMIN — BUPROPION HYDROCHLORIDE 150 MG: 150 TABLET, FILM COATED, EXTENDED RELEASE ORAL at 17:22

## 2020-11-08 RX ADMIN — GABAPENTIN 600 MG: 600 TABLET, FILM COATED ORAL at 21:25

## 2020-11-08 RX ADMIN — WARFARIN SODIUM 12.5 MG: 7.5 TABLET ORAL at 17:26

## 2020-11-08 RX ADMIN — DEXAMETHASONE SODIUM PHOSPHATE 10 MG: 10 INJECTION INTRAMUSCULAR; INTRAVENOUS at 02:43

## 2020-11-08 RX ADMIN — HYDROMORPHONE HYDROCHLORIDE 0.5 MG: 1 INJECTION, SOLUTION INTRAMUSCULAR; INTRAVENOUS; SUBCUTANEOUS at 18:55

## 2020-11-08 RX ADMIN — ENOXAPARIN SODIUM 40 MG: 40 INJECTION SUBCUTANEOUS at 17:22

## 2020-11-08 RX ADMIN — OMEPRAZOLE 20 MG: 20 CAPSULE, DELAYED RELEASE ORAL at 21:25

## 2020-11-08 RX ADMIN — ALLOPURINOL 300 MG: 300 TABLET ORAL at 17:26

## 2020-11-08 RX ADMIN — TRAZODONE HYDROCHLORIDE 150 MG: 100 TABLET ORAL at 21:24

## 2020-11-08 RX ADMIN — SIMVASTATIN 20 MG: 20 TABLET, FILM COATED ORAL at 21:25

## 2020-11-08 ASSESSMENT — ENCOUNTER SYMPTOMS
DYSPHORIC MOOD: 0
FATIGUE: 1
FREQUENCY: 1
SORE THROAT: 0
WHEEZING: 1
BACK PAIN: 0
WEAKNESS: 0
COUGH: 1
HEADACHES: 0
APPETITE CHANGE: 1
DIARRHEA: 0
LIGHT-HEADEDNESS: 0
CHEST TIGHTNESS: 1
UNEXPECTED WEIGHT CHANGE: 1
ABDOMINAL PAIN: 1
NAUSEA: 0
FEVER: 1
DYSURIA: 0
CHILLS: 0
VOMITING: 0
SHORTNESS OF BREATH: 1

## 2020-11-08 ASSESSMENT — MIFFLIN-ST. JEOR
SCORE: 1617.75
SCORE: 1608.33

## 2020-11-08 ASSESSMENT — ACTIVITIES OF DAILY LIVING (ADL)
ADLS_ACUITY_SCORE: 13
ADLS_ACUITY_SCORE: 13

## 2020-11-08 NOTE — ED NOTES
Pt arrives with complaints of abdominal pain for the last 2 weeks as well as SOB for thye last 10 days. She states that both issues were a bigger problem and she was having more trouble breathing tonight. She called EMS and  Was started on PO2 by Police. She arrives with an IV and PO2 . She was able to transfer to the bed in the ED with assistance. She states that she has been winded with any activity. Poor appetite.Urine has been darker and stools have been looser. She shows me her Abdomen which she states is much more swollen as well as her legs bilat which are swollen and red. Her abdomen S dark  Pink She states that she was seen at the clinic 2 days ago for her abdominal issues.

## 2020-11-08 NOTE — ED NOTES
DATE:  11/8/2020   TIME OF RECEIPT FROM LAB:  0522  LAB TEST:  Troponin  LAB VALUE:  0.198  RESULTS GIVEN WITH READ-BACK TO (PROVIDER):  Michael Elliott,*  TIME LAB VALUE REPORTED TO PROVIDER:   0530

## 2020-11-08 NOTE — ED PROVIDER NOTES
History     Chief Complaint   Patient presents with     Shortness of Breath     increasing worse 10 days     Abdominal Pain     2 weeks     HPI  Bia Bill is a 54 year old female with a history of obesity, previous DVT, obstructive sleep apnea, COPD, and hypertension presenting for evaluation of roughly 10 days to 2 weeks of trouble breathing with development of diffuse abdominal pain over the past few days as well.  She reports progressive worsening pain associated with some weight gain over the past 2 weeks.  Denies any chest pain but has had increasing difficulty breathing.  Has baseline COPD and is not on oxygen at baseline.  Does report some low-grade fevers which she states was measured as high as 101 a few days ago.  Reports a mild chronic cough, not significantly changed.  No known sick contacts.  Reports difficulty eating with abdominal discomfort and fullness with eating.  She states it feels somewhat like her food gets stuck as she swallows.  Denies any diarrhea or constipation.  Was seen in the office in late October and subsequently in the ED 3 days ago for similar symptoms.  Had a CT on both November 3 and November 5 for evaluation of the symptoms.  Patient is frustrated and reports severe pain and difficulty breathing both are making sleep very difficult.  Denies history of heart failure.  Denies recent chest pain.  Denies known sick contacts.  Current pain is diffuse across her entire abdomen without significant localization.      ==================================================================    CHART REVIEW:      Study Result    EXAM: CT ABDOMEN PELVIS W CONTRAST  LOCATION: [facility]  DATE/TIME: 11/5/2020 10:53 PM     INDICATION: Abd distension  Abd infection (incl peritonitis)  COMPARISON: None.  TECHNIQUE: CT scan of the abdomen and pelvis was performed following injection of IV contrast. Multiplanar reformats were obtained. Dose reduction techniques were  used.  CONTRAST: 100mL Isovue-370     FINDINGS:   LOWER CHEST: Linear discoid atelectasis or scar at each lung base. Cardiomegaly.     HEPATOBILIARY: Prior cholecystectomy. Slightly mottled congested appearance of liver on earliest scan with normal appearance on delayed imaging suggestive of some degree of CHF.     PANCREAS: Normal.     SPLEEN: Normal.     ADRENAL GLANDS: Normal.     KIDNEYS/BLADDER: 3 mm right lower pole stone. 8mm presumed cyst upper pole right kidney. No hydronephrosis     BOWEL: No obstruction or inflammatory change. Appendix normal.     LYMPH NODES: Small retroperitoneal lymph nodes within normal limits.     VASCULATURE: Metallic stent left common iliac vein appears patent.     PELVIC ORGANS: Hysterectomy. No adnexal lesions. Minimal volume ascites.     MUSCULOSKELETAL: Bony structures unremarkable. There is very prominent diffuse soft tissue stranding throughout subcutaneous spaces of abdomen and pelvis consistent with anasarca.                                                                      IMPRESSION:   1.  There is no inflammatory peritoneal process identified, bowel unremarkable.  2.  There is a somewhat congested appearance of liver likely relating to cardiac dysfunction.  3.  Tiny volume ascites and pronounced anasarca also consistent with third spacing/cardiac disease.  4.  3 mm right lower pole kidney stone.         END CHART REVIEW  ==================================================================          Allergies:  Allergies   Allergen Reactions     Darvocet [Propoxyphene N-Apap] Anaphylaxis     Darvocet,Percocet      Percocet [Oxycodone-Acetaminophen] Anaphylaxis, Hives and Swelling     Has tolerated hydromorphone in the past.      Asa [Aspirin] Hives     Aspirin causes seizures and hives, throat swelling.   Has tolerated ketorolac in the past.        Bee      Ibuprofen Swelling     Throat swelling per patient      Lyrica [Pregabalin] Other (See Comments) and Swelling      dizziness     Povidone Iodine Rash     Reaction to topical betadine     Tape [Adhesive Tape] Rash       Problem List:    Patient Active Problem List    Diagnosis Date Noted     Thrombocytopenia (H) 06/22/2020     Priority: Medium     Dyspnea 01/23/2020     Priority: Medium     Type 2 diabetes mellitus (H) 12/10/2019     Priority: Medium     A1c Oct 2019 = 6.1       Bilateral edema of lower extremity 12/10/2019     Priority: Medium     COPD exacerbation (H) 10/29/2019     Priority: Medium     Lymphedema of both lower extremities 10/29/2019     Priority: Medium     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 10/03/2018     Priority: Medium     Conductive hearing loss of left ear with restricted hearing of right ear 04/17/2018     Priority: Medium     Sensorineural hearing loss (SNHL) of right ear with restricted hearing of left ear 04/17/2018     Priority: Medium     Morbid obesity with alveolar hypoventilation (H) 02/01/2017     Priority: Medium     Cyst of left ovary 01/11/2016     Priority: Medium     PVD (peripheral vascular disease) with claudication (H) 10/30/2015     Priority: Medium     LEFT LE. STENT x 3 LEFT UPPER LEG       Morbid obesity (H) 06/17/2015     Priority: Medium     Vitamin D deficiency 11/05/2014     Priority: Medium     Problem list name updated by automated process. Provider to review       Sebaceous cyst of right axilla 10/14/2014     Priority: Medium     HTN, goal below 140/90 10/14/2014     Priority: Medium     Left leg pain 10/14/2014     Priority: Medium     History of stroke symptoms, stroke ruled out on imaging 07/10/2014     Priority: Medium     Admissions in 2011 and 2014 for stroke-like symptoms. Evaluated by neurology on both occasions, imaging not consistent with stroke. Thought to be due to conversion disorder in 2011.       Somatization disorder 07/10/2014     Priority: Medium     Benign neoplasm of colon (POLYPOSIS) 06/04/2014      Priority: Medium     Specimen #: [id number]   Collected: 11/7/2017   Received: 11/7/2017   Reported: 11/9/2017 19:59   Ordering Phy(s): CARMEL GALLOWAY     For improved result formatting, select 'View Enhanced Report Format'   under Linked Documents section.     SPECIMEN(S):   Colon polyp, 30 cm     FINAL DIAGNOSIS:   Colon at 30 cm, mucosal biopsy:   - Hyperplastic polyp, with focal changes suggestive of sessile serrated   adenoma.     Electronically signed out by:     Eric Rivas M.D.  = per Surveillance Recommendations:  Repeat in 5 yrs.    Notes Recorded by Moris Thomas MD on 4/25/2014 at 1:36 PM  Adenomatous polyp colonoscopy 5 years         DDD (degenerative disc disease), cervical 12/17/2013     Priority: Medium     Health Care Home 09/03/2013     Priority: Medium     Status:  Accepted  Care Coordinator:  Alla Fernandez    See Letters for [facility]  Date:  October 20, 2014         Rosacea 08/26/2013     Priority: Medium     COPD (chronic obstructive pulmonary disease) (H) 08/23/2012     Priority: Medium     Pulmonary Function test by Sylvester Erickson MD on 9/13/2013 3:38 PM   IDENTIFICATION: Bia Bill is a 47-year-old female with obesity and a history of tobacco use.   RESULTS:   1. Spirometry: FEV1 moderately reduced. FVC moderately reduced. FEV1/FVC ratio normal.   2. Bronchodilator Response: No significant change is seen with bronchodilators.   INTERPRETATION: Moderate reduction in FEV1 and FVC with well-preserved FEV1/FVC ratio. This can be seen in the setting of obstructive lung disease with air trapping or with restrictive lung disease. Clinical correlation is needed. Further testing with lung volumes and DLCO may be useful.   SYLVESTER ERICKSON MD          Moderate mixed bipolar I disorder (H) 10/13/2011     Priority: Medium     Problem list name updated by automated process. Provider to review       Personality disorder, depressive 10/13/2011     Priority: Medium     Erythrocytosis       Priority: Medium     Smoker 04/01/2011     Priority: Medium     3/2011  Not interested in quitting at this time.   Will consider and let us know how we can be of assistance.   Understands long term risks associated with same and increased risk of blood clot formation.        GILES (Obstructive Sleep Apnea)-Moderate (AHI 16) 03/01/2010     Priority: Medium     RLS (restless legs syndrome) 03/01/2010     Priority: Medium     Ferritin 11.       Fatty liver 08/28/2009     Priority: Medium     US on 8/26/2009        Severe episode of recurrent major depressive disorder (H) 11/03/2008     Priority: Medium     Hypothyroidism 05/13/2008     Priority: Medium     was hyperthyroid - Hashimoto's and graves and had iodine treatment done at [facility] in early 2008.    Now hypothyroid - is following at [facility], on 125mcg levothyroxine - see scanned documents       Esophageal reflux 04/29/2008     Priority: Medium     Developmental reading disorder 09/12/2007     Priority: Medium     Problem list name updated by automated process. Provider to review       Chondromalacia of patella 02/27/2007     Priority: Medium     SECONDARY POLYCYTHEMIA      Priority: Medium     Betheda HGB, a high-oxygen affinity hemoglobin which results in a normal compensatory erythrocytosis.  There is no specific therapy needed.  I did order a hemoglobin electrophoresis today to help confirm this in Bia, as she tells me she has never had this test performed. Dr. Riley 1/17/07       Alcohol abuse, in remission 08/01/2006     Priority: Medium     Quit [address] ordered, lost her children,        Mild persistent asthma 07/11/2006     Priority: Medium     Polyneuropathy in other diseases classified elsewhere (H) 07/11/2006     Priority: Medium     Has had chronic pain in the left groin and upper leg secondary to a blood clot in the thigh, treated with neurontin without benefit, still has chronic pain since 2005, sharp shooting  intermittantly.    MRI/MRA 12/07 - [place] - see scanned documents   - had mild nonspecific white matter changes  Otherwise negative     MRI c- spine [place] 12/07 - no cervical cord abn.  Small disc protrusion C6-C7, no impingement       DVT (deep venous thrombosis) (H) 07/11/2006     Priority: Medium     She had a DVT post pregnancy and delivery in 1992.   3/26/2004:Hospitalized at [facility] for extensive left lower leg DVT.  This occurred a month after pneumonia episode and decreased activity.  She had lytic therapy, tPA followed by Possis mechanical thrombectomy device and three stents in veins.  She did have Groshong catheter at the time.  There was a positive lupus anticoagulant, negative Factor V and cardiolipin clement.           Past Medical History:    Past Medical History:   Diagnosis Date     Acromioclavicular joint arthritis 1/25/2017     Acute bronchospasm 8/15/2016     Acute gouty arthritis 8/4/2014     Anxiety state, unspecified      Bipolar I disorder, most recent episode (or current) unspecified      Closed fracture of metatarsal bone 6/5/2007     Depressive disorder, not elsewhere classified      DVT, lower extremity (H)      Headache 10/17/2014     Impingement syndrome of shoulder region 1/25/2017     Polycythemia, secondary      Right shoulder pain 10/14/2014     Trochanteric bursitis of both hips 1/11/2016       Past Surgical History:    Past Surgical History:   Procedure Laterality Date     BONE MARROW BIOPSY, BONE SPECIMEN, NEEDLE/TROCAR N/A 11/17/2014    Procedure: BIOPSY BONE MARROW;  Surgeon: Hay Aaron MD;  Location: [facility]     D & C  10/26/09    with uterine ablation     ENDOVASCULAR PLACEMENT VASCULAR DEVICE Left     Left leg stent x 3     ESOPHAGOSCOPY, GASTROSCOPY, DUODENOSCOPY (EGD), COMBINED  4/21/2014    Procedure: Gastroscopy;  Surgeon: Moris Thomas MD;  Location: [facility]     HYSTERECTOMY, PAP NO LONGER INDICATED  1-4-2010     LAPAROSCOPIC CHOLECYSTECTOMY N/A 1/4/2019     Procedure: Laparoscopic cholecystectomy;  Surgeon: Aaron Siegel MD;  Location: [facility]     LITHOTRIPSY  2004    Lithotrypsy       Family History:    Family History   Problem Relation Age of Onset     Hypertension Mother      Diabetes Mother      Blood Disease Mother      Heart Disease Mother         chf     Cerebrovascular Disease Mother      Hypertension Father      Cancer Father         lung cancer     Allergies Sister      Diabetes Sister      Depression Sister      Hypertension Sister        Social History:  Marital Status:   [4]  Social History     Tobacco Use     Smoking status: Current Every Day Smoker     Packs/day: 0.50     Years: 43.00     Pack years: 21.50     Types: Cigarettes     Smokeless tobacco: Never Used     Tobacco comment: started at age 11.  Quit during pregnancyX4.  Quit 3 months ago.    Substance Use Topics     Alcohol use: No     Alcohol/week: 0.0 standard drinks     Comment: quit 1995     Drug use: No     Comment: past hx 22 years ago/ uppers downers, canibus        Medications:         acetaminophen (TYLENOL) 500 MG tablet       ADVAIR -21 MCG/ACT inhaler       albuterol (PROAIR HFA/PROVENTIL HFA/VENTOLIN HFA) 108 (90 Base) MCG/ACT inhaler       albuterol (PROVENTIL) (2.5 MG/3ML) 0.083% neb solution       alcohol swab prep pads       allopurinol (ZYLOPRIM) 300 MG tablet       ARIPiprazole (ABILIFY) 10 MG tablet       blood glucose (NO BRAND SPECIFIED) test strip       blood glucose calibration (NO BRAND SPECIFIED) solution       blood glucose monitoring (NO BRAND SPECIFIED) meter device kit       buPROPion (WELLBUTRIN XL) 150 MG 24 hr tablet       cephALEXin (KEFLEX) 500 MG capsule       EPINEPHrine (ANY BX GENERIC EQUIV) 0.3 MG/0.3ML injection 2-pack       furosemide (LASIX) 40 MG tablet       gabapentin (NEURONTIN) 300 MG capsule       gabapentin (NEURONTIN) 600 MG tablet       ipratropium - albuterol 0.5 mg/2.5 mg/3 mL (DUONEB) 0.5-2.5 (3) MG/3ML neb solution        "lamoTRIgine (LAMICTAL) 100 MG tablet       levothyroxine (SYNTHROID/LEVOTHROID) 175 MCG tablet       metFORMIN (GLUCOPHAGE) 1000 MG tablet       mometasone-formoterol (DULERA) 200-5 MCG/ACT oral inhaler       omeprazole (PRILOSEC) 20 MG DR capsule       order for DME       order for DME       order for DME       order for DME       order for DME       potassium chloride ER (KLOR-CON M) 10 MEQ CR tablet       pramipexole (MIRAPEX) 0.125 MG tablet       simvastatin (ZOCOR) 20 MG tablet       thin (NO BRAND SPECIFIED) lancets       traZODone (DESYREL) 150 MG tablet       trimethoprim-polymyxin b (POLYTRIM) 71896-7.1 UNIT/ML-% ophthalmic solution       VITAMIN D3 25 MCG (1000 UT) tablet       warfarin ANTICOAGULANT (JANTOVEN ANTICOAGULANT) 5 MG tablet          Review of Systems   Constitutional: Positive for appetite change, fatigue, fever and unexpected weight change (Increased). Negative for chills.   HENT: Negative for congestion and sore throat.    Respiratory: Positive for cough, chest tightness, shortness of breath and wheezing.    Cardiovascular: Positive for leg swelling. Negative for chest pain.   Gastrointestinal: Positive for abdominal pain. Negative for diarrhea, nausea and vomiting.   Genitourinary: Positive for frequency (He is on Lasix and reports recurrent urine output). Negative for dysuria.   Musculoskeletal: Negative for back pain.   Skin: Positive for rash (Redness of abdomen and lower legs).   Neurological: Negative for weakness, light-headedness and headaches.   Psychiatric/Behavioral: Negative for dysphoric mood.   All other systems reviewed and are negative.      Physical Exam   BP: (!) 156/72  Pulse: 78  Temp: 98.1  F (36.7  C)  Resp: 24  Height: 149.9 cm (4' 11\")  Weight: 109.8 kg (242 lb)  SpO2: 94 %      Physical Exam  Vitals signs and nursing note reviewed.   Constitutional:       Appearance: She is obese. She is not ill-appearing or diaphoretic.   HENT:      Head: Atraumatic.      " Mouth/Throat:      Mouth: Mucous membranes are moist.   Cardiovascular:      Rate and Rhythm: Normal rate.      Heart sounds: Murmur (Systolic) present.   Pulmonary:      Breath sounds: Rales (Bilateral bases) present. No wheezing or rhonchi.      Comments: Mild increased work of breathing with tachypnea  Abdominal:      General: Abdomen is protuberant. Bowel sounds are normal.      Tenderness: There is generalized abdominal tenderness. There is no guarding.      Comments: Abdomen firm but not rigid.  Diffuse blanchable erythema present from just above the umbilicus distally extending through her legs bilaterally   Musculoskeletal: Normal range of motion.      Right lower leg: Edema present.      Left lower leg: Edema present.      Comments: Pitting bilateral edema 2-3+   Skin:     General: Skin is warm and dry.      Capillary Refill: Capillary refill takes less than 2 seconds.   Neurological:      Mental Status: She is alert and oriented to person, place, and time.   Psychiatric:         Mood and Affect: Mood normal.         ED Course        Procedures               EKG Interpretation:      Interpreted by Michael Elliott MD  Time reviewed: 0305  Symptoms at time of EKG: dyspnea   Rhythm: normal sinus   Rate: Normal  Axis: Normal  Ectopy: premature atrial contraction  Conduction: right bundle branch block (incomplete)  ST Segments/ T Waves: No acute ischemic changes  Q Waves: none  Comparison to prior: Not significantly change compared EKG 1/22/2020    Clinical Impression: Sinus rhythm without acute ischemic abnormality, not significant change from previous                         Results for orders placed or performed during the hospital encounter of 11/08/20 (from the past 24 hour(s))   CBC with platelets differential   Result Value Ref Range    WBC 6.6 4.0 - 11.0 10e9/L    RBC Count 4.92 3.8 - 5.2 10e12/L    Hemoglobin 11.3 (L) 11.7 - 15.7 g/dL    Hematocrit 40.5 35.0 - 47.0 %    MCV 82 78 - 100 fl    MCH  23.0 (L) 26.5 - 33.0 pg    MCHC 27.9 (L) 31.5 - 36.5 g/dL    RDW 20.1 (H) 10.0 - 15.0 %    Platelet Count 120 (L) 150 - 450 10e9/L    Diff Method Automated Method     % Neutrophils 68.9 %    % Lymphocytes 21.2 %    % Monocytes 7.9 %    % Eosinophils 0.9 %    % Basophils 0.6 %    % Immature Granulocytes 0.5 %    Nucleated RBCs 0 0 /100    Absolute Neutrophil 4.6 1.6 - 8.3 10e9/L    Absolute Lymphocytes 1.4 0.8 - 5.3 10e9/L    Absolute Monocytes 0.5 0.0 - 1.3 10e9/L    Absolute Eosinophils 0.1 0.0 - 0.7 10e9/L    Absolute Basophils 0.0 0.0 - 0.2 10e9/L    Abs Immature Granulocytes 0.0 0 - 0.4 10e9/L    Absolute Nucleated RBC 0.0    Comprehensive metabolic panel   Result Value Ref Range    Sodium 140 133 - 144 mmol/L    Potassium 3.6 3.4 - 5.3 mmol/L    Chloride 104 94 - 109 mmol/L    Carbon Dioxide 30 20 - 32 mmol/L    Anion Gap 6 3 - 14 mmol/L    Glucose 156 (H) 70 - 99 mg/dL    Urea Nitrogen 17 7 - 30 mg/dL    Creatinine 0.86 0.52 - 1.04 mg/dL    GFR Estimate 77 >60 mL/min/[1.73_m2]    GFR Estimate If Black 89 >60 mL/min/[1.73_m2]    Calcium 8.4 (L) 8.5 - 10.1 mg/dL    Bilirubin Total 0.6 0.2 - 1.3 mg/dL    Albumin 3.1 (L) 3.4 - 5.0 g/dL    Protein Total 5.9 (L) 6.8 - 8.8 g/dL    Alkaline Phosphatase 104 40 - 150 U/L    ALT 19 0 - 50 U/L    AST 17 0 - 45 U/L   Lactic acid whole blood   Result Value Ref Range    Lactic Acid 1.7 0.7 - 2.0 mmol/L   Blood gas venous   Result Value Ref Range    Ph Venous 7.36 7.32 - 7.43 pH    PCO2 Venous 61 (H) 40 - 50 mm Hg    PO2 Venous 16 (L) 25 - 47 mm Hg    Bicarbonate Venous 35 (H) 21 - 28 mmol/L    FIO2 100%    INR   Result Value Ref Range    INR 1.32 (H) 0.86 - 1.14   NT pro BNP   Result Value Ref Range    N-Terminal Pro BNP Inpatient 1,868 (H) 0 - 900 pg/mL   Troponin I   Result Value Ref Range    Troponin I ES 0.197 (HH) 0.000 - 0.045 ug/L   XR Chest Port 1 View    Narrative    EXAM: CHEST SINGLE VIEW PORTABLE  LOCATION: [facility]  DATE/TIME: 11/8/2020 2:02  AM    INDICATION: Dyspnea.  COMPARISON: 03/14/2018.    FINDINGS: Mildly to moderately increased interstitial opacities in both lungs. Cardiomegaly. Atherosclerotic calcification in the thoracic aorta.      Impression    IMPRESSION: Mildly to moderately increased interstitial opacities in the lungs, like representing interstitial pulmonary edema.    US Lower Extremity Venous Duplex Bilateral    Narrative    EXAM: US LOWER EXTREMITY VENOUS DUPLEX BILATERAL  LOCATION: [facility]  DATE/TIME: 11/8/2020 3:18 AM    INDICATION: Lower extremity swelling.  COMPARISON: None.  TECHNIQUE: Venous Duplex ultrasound of bilateral lower extremities with and without compression, augmentation and duplex. Color flow and spectral Doppler with waveform analysis performed.    FINDINGS: Exam includes the common femoral, femoral, popliteal veins as well as segmentally visualized deep calf veins and greater saphenous vein.     RIGHT: No deep vein thrombosis. No superficial thrombophlebitis. No popliteal cyst.    LEFT: No deep vein thrombosis. No superficial thrombophlebitis. No popliteal cyst.      Impression    IMPRESSION:  1.  No deep venous thrombosis in the bilateral lower extremities.   Troponin I (second draw)   Result Value Ref Range    Troponin I ES 0.198 () 0.000 - 0.045 ug/L     *Note: Due to a large number of results and/or encounters for the requested time period, some results have not been displayed. A complete set of results can be found in Results Review.       Medications   iopamidol (ISOVUE-370) solution 100 mL (has no administration in time range)   sodium chloride 0.9 % bag 500mL for CT scan flush use (has no administration in time range)   nitroGLYcerin (NITROSTAT) sublingual tablet 0.4 mg (0.4 mg Sublingual Given 11/8/20 0248)   HYDROmorphone (PF) (DILAUDID) injection 0.5 mg (0.5 mg Intravenous Given 11/8/20 0251)   dexamethasone PF (DECADRON) injection 10 mg (10 mg Intravenous Given 11/8/20 0243)    furosemide (LASIX) injection 40 mg (40 mg Intravenous Given 11/8/20 9416)   potassium chloride ER (KLOR-CON M) CR tablet 20 mEq (20 mEq Oral Given 11/8/20 4720)   furosemide (LASIX) injection 40 mg (40 mg Intravenous Given 11/8/20 5723)     3:47 AM: BNP is elevated and imaging today and earlier suggestive of CHF. Ordered some potassium due to low normal level and receiving lasix.     4:16 AM: Called lab due to vbg, lactic, and trop not resulted. Discussed with Kd: normal lactic @1.7.   Trop 0.197. Has aspirin allergy so will not give. Will order second trop now (2 hr delta) and consider heparin based on this.     5:48 AM Patient re-assessed: Resting comfortably.  Reports feeling somewhat improved.  Has had minimal urine output after Lasix.  Troponin stable.  Still without chest pain.  Will redose Lasix    6:30 AM: Patient was signed out at shift change to Dr Sanford       Assessments & Plan (with Medical Decision Making)  54-year-old female with history obesity, DVTs, COPD, and obstructive sleep apnea presenting for evaluation of roughly 2 weeks of increasing difficulty breathing with progressive diffuse abdominal pain and lower extremity swelling.  Has been seen several times for the same symptoms without clear diagnosis.  Was seen few days ago in the ED and diagnosed with presumed abdominal wall cellulitis and was started on Keflex however, her symptoms have not improved and have somewhat worsened prompting return evaluation tonight.  Patient has largely been afebrile but does report a single fever of 101 a few days ago.  In the ED, patient uncomfortable appearing with some mildly low oxygen saturations.  Has some mild rales in the bases of her lungs and notable peripheral edema suggestive of CHF.  Given her COPD history, give her dose of dexamethasone and screening Covid test taken.  Given she has had 2 CTs in the last few days, CT imaging thought to be no longer helpful in additional information however  previous CT did show evidence of anasarca and CHF.  Obtain a bilateral lower extremity ultrasound to rule out large occlusive bilateral proximal clot: Negative.  Blood work showed evidence of new CHF with an elevated BNP as well as a moderately elevated but stable troponin.  No acute ischemic abnormality on EKG.  VBG does show some hypercapnia but without acidosis to suggest an acute process, likely from a combination of COPD and chronic obesity with hypoventilation.  Ordered Lasix for diuresis with minimal output so redosed a second time in the ED.  Plan for admission for work-up of new onset heart failure with echo and continue diuresis.  No beds available here at the time of shift change so was signed out pending disposition.     I have reviewed the nursing notes.    I have reviewed the findings, diagnosis, plan and need for follow up with the patient.       New Prescriptions    No medications on file       Final diagnoses:   Acute congestive heart failure, unspecified heart failure type (H)   Obesity hypoventilation syndrome (H)       11/8/2020   [facility] EMERGENCY DEPT     Elliott, Michael Walker MD  11/08/20 0631

## 2020-11-08 NOTE — ED PROVIDER NOTES
Emergency Department Patient Sign-out       Brief HPI:  This is a 54 year old female signed out to me by Dr. Elliott at 6am .  See Dr. Elliott's ED note for details of presentation, ED course, work-up prior to handoff.  Briefly by report patient presented with symptoms concerning for acute heart failure syndrome with 2-week history of shortness of breath, dyspnea exertion, increasing lower extremity swelling and weight gain.  Patient was evaluated 3 days earlier on November 5, 2020 and treated for abdominal wall cellulitis-with oral cephalexin..  CT imaging on November 5, 2020 -revealed a congested appearing liver concerning for cardiac dysfunction with tiny volume ascites and pronounced anasarca no acute inflammatory process was noted about the peritoneum. At hand-off patient is awaiting bed availability and will benefit from hospitalization with consideration for cardiac echocardiogram and maximization/optimization of medical care and therapy for heart failure syndrome.  Patient treated with IV furosemide (40mg x 2).  Patient is noted to have elevated troponin felt to be related to her clinical syndrome of heart failure.       Significant Events after my assuming care:Reviewed Dr. Elliott's ED note and prior diagnostic work-up prior to assuming care.  Patient was seen and examined at 10:30 AM.  Handoff was reviewed with the patient.  Her history of presentation was also reviewed.  Patient lives in [place] with her friend she reports she is not taking an active prescriptions and that she was only taking cephalexin as prescribed 3 days prior.  Patient's allergy profile was also reviewed.  Working diagnosis of heart failure syndrome with anasarca and current bed status reviewed  Patient had received a total of 80 mg of IV furosemide and urine output was being monitored.  She was on 3.5 L 95 to 98% on room air high BMI,.  Coarse rhonchi in both lung bases with bibasilar crackles. Large BMI, with lower extremity  "edema.    Notified of bed availability at 10:30 AM.  I spoke with Dr. JOEL Luz-admitting hospitalist at 11 AM who agreed to assume care upon handoff for admission after reviewing history as provided at the time of handoff, and my discussion with patient.  Plan of care was reviewed with the patient.      Exam:   Patient Vitals for the past 24 hrs:   BP Temp Temp src Pulse Resp SpO2 Height Weight   11/08/20 1100 132/65 -- -- 75 30 99 % -- --   11/08/20 1000 (!) 145/68 -- -- 80 15 100 % -- --   11/08/20 0900 (!) 159/72 -- -- 80 25 95 % -- --   11/08/20 0800 (!) 149/77 -- -- 84 23 -- -- --   11/08/20 0700 (!) 140/82 -- -- 78 13 97 % -- --   11/08/20 0405 (!) 145/79 -- -- 80 -- 96 % -- --   11/08/20 0331 -- -- -- -- -- 97 % -- --   11/08/20 0330 (!) 140/72 -- -- 78 -- -- -- --   11/08/20 0300 128/64 -- -- 77 -- 92 % -- --   11/08/20 0200 138/73 -- -- 73 -- 96 % -- --   11/08/20 0145 (!) 158/64 -- -- 74 -- 98 % -- --   11/08/20 0133 (!) 156/72 98.1  F (36.7  C) Oral 78 24 94 % 1.499 m (4' 11\") 109.8 kg (242 lb)           ED RESULTS:   Results for orders placed or performed during the hospital encounter of 11/08/20 (from the past 24 hour(s))   CBC with platelets differential     Status: Abnormal    Collection Time: 11/08/20  1:37 AM   Result Value Ref Range    WBC 6.6 4.0 - 11.0 10e9/L    RBC Count 4.92 3.8 - 5.2 10e12/L    Hemoglobin 11.3 (L) 11.7 - 15.7 g/dL    Hematocrit 40.5 35.0 - 47.0 %    MCV 82 78 - 100 fl    MCH 23.0 (L) 26.5 - 33.0 pg    MCHC 27.9 (L) 31.5 - 36.5 g/dL    RDW 20.1 (H) 10.0 - 15.0 %    Platelet Count 120 (L) 150 - 450 10e9/L    Diff Method Automated Method     % Neutrophils 68.9 %    % Lymphocytes 21.2 %    % Monocytes 7.9 %    % Eosinophils 0.9 %    % Basophils 0.6 %    % Immature Granulocytes 0.5 %    Nucleated RBCs 0 0 /100    Absolute Neutrophil 4.6 1.6 - 8.3 10e9/L    Absolute Lymphocytes 1.4 0.8 - 5.3 10e9/L    Absolute Monocytes 0.5 0.0 - 1.3 10e9/L    Absolute Eosinophils 0.1 0.0 - 0.7 " 10e9/L    Absolute Basophils 0.0 0.0 - 0.2 10e9/L    Abs Immature Granulocytes 0.0 0 - 0.4 10e9/L    Absolute Nucleated RBC 0.0    Comprehensive metabolic panel     Status: Abnormal    Collection Time: 11/08/20  1:37 AM   Result Value Ref Range    Sodium 140 133 - 144 mmol/L    Potassium 3.6 3.4 - 5.3 mmol/L    Chloride 104 94 - 109 mmol/L    Carbon Dioxide 30 20 - 32 mmol/L    Anion Gap 6 3 - 14 mmol/L    Glucose 156 (H) 70 - 99 mg/dL    Urea Nitrogen 17 7 - 30 mg/dL    Creatinine 0.86 0.52 - 1.04 mg/dL    GFR Estimate 77 >60 mL/min/[1.73_m2]    GFR Estimate If Black 89 >60 mL/min/[1.73_m2]    Calcium 8.4 (L) 8.5 - 10.1 mg/dL    Bilirubin Total 0.6 0.2 - 1.3 mg/dL    Albumin 3.1 (L) 3.4 - 5.0 g/dL    Protein Total 5.9 (L) 6.8 - 8.8 g/dL    Alkaline Phosphatase 104 40 - 150 U/L    ALT 19 0 - 50 U/L    AST 17 0 - 45 U/L   Lactic acid whole blood     Status: None    Collection Time: 11/08/20  1:37 AM   Result Value Ref Range    Lactic Acid 1.7 0.7 - 2.0 mmol/L   Blood gas venous     Status: Abnormal    Collection Time: 11/08/20  1:37 AM   Result Value Ref Range    Ph Venous 7.36 7.32 - 7.43 pH    PCO2 Venous 61 (H) 40 - 50 mm Hg    PO2 Venous 16 (L) 25 - 47 mm Hg    Bicarbonate Venous 35 (H) 21 - 28 mmol/L    FIO2 100%    INR     Status: Abnormal    Collection Time: 11/08/20  1:37 AM   Result Value Ref Range    INR 1.32 (H) 0.86 - 1.14   NT pro BNP     Status: Abnormal    Collection Time: 11/08/20  1:37 AM   Result Value Ref Range    N-Terminal Pro BNP Inpatient 1,868 (H) 0 - 900 pg/mL   Troponin I     Status: Abnormal    Collection Time: 11/08/20  1:37 AM   Result Value Ref Range    Troponin I ES 0.197 (HH) 0.000 - 0.045 ug/L   XR Chest Port 1 View     Status: None    Collection Time: 11/08/20  2:15 AM    Narrative    EXAM: CHEST SINGLE VIEW PORTABLE  LOCATION: [facility], [place]  DATE/TIME: 11/8/2020 2:02 AM    INDICATION: Dyspnea.  COMPARISON: 03/14/2018.    FINDINGS: Mildly to moderately increased  interstitial opacities in both lungs. Cardiomegaly. Atherosclerotic calcification in the thoracic aorta.      Impression    IMPRESSION: Mildly to moderately increased interstitial opacities in the lungs, like representing interstitial pulmonary edema.    US Lower Extremity Venous Duplex Bilateral     Status: None    Collection Time: 11/08/20  3:50 AM    Narrative    EXAM: US LOWER EXTREMITY VENOUS DUPLEX BILATERAL  LOCATION: [facility]  DATE/TIME: 11/8/2020 3:18 AM    INDICATION: Lower extremity swelling.  COMPARISON: None.  TECHNIQUE: Venous Duplex ultrasound of bilateral lower extremities with and without compression, augmentation and duplex. Color flow and spectral Doppler with waveform analysis performed.    FINDINGS: Exam includes the common femoral, femoral, popliteal veins as well as segmentally visualized deep calf veins and greater saphenous vein.     RIGHT: No deep vein thrombosis. No superficial thrombophlebitis. No popliteal cyst.    LEFT: No deep vein thrombosis. No superficial thrombophlebitis. No popliteal cyst.      Impression    IMPRESSION:  1.  No deep venous thrombosis in the bilateral lower extremities.   Troponin I (second draw)     Status: Abnormal    Collection Time: 11/08/20  4:29 AM   Result Value Ref Range    Troponin I ES 0.198 (HH) 0.000 - 0.045 ug/L       ED MEDICATIONS:   Medications   iopamidol (ISOVUE-370) solution 100 mL (has no administration in time range)   sodium chloride 0.9 % bag 500mL for CT scan flush use (has no administration in time range)   nitroGLYcerin (NITROSTAT) sublingual tablet 0.4 mg (0.4 mg Sublingual Given 11/8/20 0248)   HYDROmorphone (PF) (DILAUDID) injection 0.5 mg (0.5 mg Intravenous Given 11/8/20 0251)   furosemide (LASIX) injection 80 mg (has no administration in time range)   dexamethasone PF (DECADRON) injection 10 mg (10 mg Intravenous Given 11/8/20 0243)   furosemide (LASIX) injection 40 mg (40 mg Intravenous Given 11/8/20 0246)    potassium chloride ER (KLOR-CON M) CR tablet 20 mEq (20 mEq Oral Given 11/8/20 4450)   furosemide (LASIX) injection 40 mg (40 mg Intravenous Given 11/8/20 0623)         Impression:    ICD-10-CM    1. Acute congestive heart failure, unspecified heart failure type (H)  I50.9    2. Obesity hypoventilation syndrome (H)  E66.2        Plan:    Admit with concern for acute heart failure syndrome superimposed on history of COPD and obesity hypoventilation with need for diuresis, cardiac echocardiogram and maximization/optimization of medical therapy.      MD Claribel Montejo Ebenezer Tope, MD  11/08/20 3617

## 2020-11-08 NOTE — H&P
"[facility]     History and Physical - Hospitalist Service       Date of Admission:  11/8/2020    Assessment & Plan   Bia Bill is a 54 year old female with Bipolar disorder, morbid obesity, untreated GILES/obesity hypoventilation syndrome, asthma, history of deep vein thrombosis, chronic lower extremities edema, possible history of diastolic congestive heart failure kianna is admitted on 11/8/2020. She presents with 2 weeks of increased abdominal pain and distention, worsened lower extremity edema, and shortness of breath          Acute congestive heart failure, suspect right sided  Likely cor pulmonale due to obesity and untreated hypoventilation syndrome. BNP 1861. Covid negative. CXR shows some interstitial prominence and possibly a right infrahilar area of consolidation, but may be crowding and atelectasis. She is not requiring O2, but was placed on O2 presumably for symptoms.   Received Lasix 40, 40, and 80 IV in ED, patient reports good diuresis  - Continue Lasix 80 IV BID  - Hold usual Lasix 40 BID   - Check UA to rule out nephrotic range proteinuria  - Stop keflex, doubt abdominal wall cellulitis  - Check Echo      Elevated troponin   Clinical presentation not consistent with acute coronary syndrome. Lexiscan 5/2018 negative for ischemia.   -Trend troponins  - Check Echo      GILES (Obstructive Sleep Apnea)/ Obesity Hypoventilation     Chronic respiratory failure with hypercapnia   Patient was recommended to start Bilevel PAP per sleep study 1/2020, however she never had follow-up and never got started on PAP  - Recommend call [facility] or Dr. Bazzi tomorrow to arrange set up      RLS (restless legs syndrome)  Poor control per patient. Has history of erythrocytosis and has had phlebotomy in past because of 'PVD' and \"Strokes\". However review of chart suggests she has had VENOUS stents and strokes were due to CONVERSION DISORDER  - Continue mirapex 0.125-0.250mg " qhs  - Check feriitin, goal >75. Consider iron replacement after discussion with Hematology      Diabetes mellitus 2  A1c 6.2 6/22/20  - Hold usual metformin 1000 BID  -  Medium insulin sliding scale      Chronic Leg Pain  - Continue Neurontin 600 TID (per med rec)      Hyperlipidemia   - Continue simvasastin 20      Moderate mixed bipolar I disorder   - Continue Abilify 10, bupropion at bedtime, Lamictal 100, trazodone 150      Mild persistent asthma  - Continue usual Dulera or Advair  After meds reconciled  - Continue albuterol MDI prn  - Hold  Usual nebs      History of deep venous thrombosis   - Continue warfarin, pharmacy to dose      Secondary polycythemia  Hgb stable      Chronic Thrombocytopenia  Stable      Hypothyroidism  TSH 3.34 6/2020  - Continue levothyroxine (SYNTHROID/LEVOTHROID) 175 MCG       Gout  - Continue allopurinol      Morbid obesity (H) (6/17/2015)  Complicates cares, diagnosis    COVID PUI  Presentation appears more consistent with congestive heart failure than COVID    - COVID PCR negative        Diet: Advance Diet as Tolerated: Regular Diet Adult    DVT Prophylaxis: Warfarin  Goldsmith Catheter: not present  Cod Status:   Full      Disposition Plan   Expected discharge: 4 - 7 days, recommended to prior living arrangement once adequately diuresed.  Entered: Sylvester Erickson MD 11/08/2020, 3:08 PM     The patient's care was discussed with the Bedside Nurse and Patient.  I spent >90 minutes with patient.     Sylvester Erickson MD  [facility]   Contact information available via [facility] Paging/Directory      ______________________________________________________________________    Chief Complaint   Chief Complaint   Patient presents with     Shortness of Breath     increasing worse 10 days     Abdominal Pain     2 weeks       History is obtained from the patient, electronic health record and emergency department physician    History of Present Illness   Bia Bill is a 54  year old female who presents with 'unbearable' pains in her legs and her abdomen    The leg pain is chronic. Her abdomen is bloated and painful. This has been going on 2 weeks. She has also had increased swelling in her legs and shortness of breath at rest with activity and is really bad laying down    She was seen in clinic 3 days agio and started on an antibiotic for possible abdominal wall cellulitis which has not helped.     She has mild nonproductive cough.    She denies Fever or chills, but has had some sweats    She has frequent loose stools chronically. She says 3 days ago she had a tar colored stool. No  bright red blood per rectum.       Intake/Output Summary (Last 24 hours) at 11/8/2020 1604  Last data filed at 11/8/2020 1331  Gross per 24 hour   Intake --   Output 875 ml   Net -875 ml     Wt Readings from Last 6 Encounters:   11/08/20 112.3 kg (247 lb 9.2 oz)   11/05/20 109.8 kg (242 lb)   11/05/20 109.8 kg (242 lb)   10/29/20 109.8 kg (242 lb)   06/22/20 100.1 kg (220 lb 9.6 oz)   01/30/20 105.1 kg (231 lb 12.8 oz)         Review of Systems    The 10 point Review of Systems is negative other than noted in the HPI or here. She has diffusely positive ROS.  Positive rhinorrhea. Mild myalgia  She has had a headache, no neck stiffness  No new joint pains or rashes.   No urine symptoms.     Past Medical History    I have reviewed this patient's medical history and updated it with pertinent information if needed.     Patient Active Problem List    Diagnosis Date Noted     Moderate mixed bipolar I disorder (H) 10/13/2011     Priority: High     Acute congestive heart failure, unspecified heart failure type (H) 11/08/2020     Priority: Medium     Chronic respiratory failure with hypercapnia (H) 11/08/2020     Priority: Medium     VBG 11/8/2020 7.36/61/16       Hyperlipidemia 11/08/2020     Priority: Medium     Elevated troponin 11/08/2020     Priority: Medium     Thrombocytopenia (H) 06/22/2020     Priority:  Medium     Chronic since 2008. 'Stable' splenomegally on CT 11/3/2020       Type 2 diabetes mellitus (H) 12/10/2019     Priority: Medium     A1c Oct 2019 = 6.1       Morbid obesity (H) 06/17/2015     Priority: Medium     Somatization disorder 07/10/2014     Priority: Medium     Personality disorder, depressive 10/13/2011     Priority: Medium     Tobacco use disorder 04/01/2011     Priority: Medium     GILES (Obstructive Sleep Apnea)/ Obesity Hypoventilation 03/01/2010     Priority: Medium     Sleep study 2/24/2010 (209#) - AHI 15.7, RDI 48.3, low O2 86%, PLMI 114  [facility] Sleep study 1/6/20 (241#) - BIPAP 22/7 and 23/8 with improvement in events and hypoventilation        Fatty liver 08/28/2009     Priority: Medium     US on 8/26/2009        Developmental reading disorder 09/12/2007     Priority: Medium     Secondary polycythemia      Priority: Medium     [place] HGB, a high-oxygen affinity hemoglobin which results in a normal compensatory erythrocytosis.    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 Patients with high oxygen affinity hemoglobin typically have higher hematocrit level from compensatory erythrocytosis and as long as they are asymptomatic they do not need phlebotomy. Given her previous history of 'peripheral artery disease' and 'stroke' Hematology recommended phlebotomy monthly with goal HCT<55% 2014.        Alcohol abuse, in remission 08/01/2006     Priority: Medium     Quit 1996 after court ordered, lost her children,        Mild persistent asthma 07/11/2006     Priority: Medium     PFT 9/13/2013  FEV1 moderately reduced. FVC moderately reduced. FEV1/FVC ratio normal. 2. Bronchodilator Response: No significant change is seen with bronchodilators. Not consistent with COPD       History of deep venous thrombosis 07/11/2006     Priority: Medium     She had a DVT post pregnancy and delivery in 1992.   Hospitalized at [facility] for extensive  left lower leg DVT 3/26/2004: This occurred a month after pneumonia episode and decreased activity.  She had lytic therapy, tPA followed by Possis mechanical thrombectomy device and three stents in veins.  She did have Groshong catheter at the time.  There was a positive lupus anticoagulant, negative Factor V and cardiolipin clement.        Lymphedema of both lower extremities 10/29/2019     Priority: Low     Chronic gout without tophus 10/03/2018     Priority: Low     Conductive hearing loss of left ear with restricted hearing of right ear 04/17/2018     Priority: Low     Sensorineural hearing loss (SNHL) of right ear with restricted hearing of left ear 04/17/2018     Priority: Low     Cyst of left ovary 01/11/2016     Priority: Low     Vitamin D deficiency 11/05/2014     Priority: Low     Benign neoplasm of colon (POLYPOSIS) 06/04/2014     Priority: Low     Notes Recorded by Moris Thomas MD on 4/25/2014 at 1:36 PM- Adenomatous polyp colonoscopy 5 years  Collected: 11/7/2017 Colon at 30 cm, mucosal biopsy: Hyperplastic polyp, with focal changes suggestive of sessile serrated       DDD (degenerative disc disease), cervical 12/17/2013     Priority: Low     [facility] 09/03/2013     Priority: Low     Status:  Accepted  Care Coordinator:  Alla Fernandez    See Letters for [facility] Care Plan  Date:  October 20, 2014         Rosacea 08/26/2013     Priority: Low     RLS (restless legs syndrome) 03/01/2010     Priority: Low     Ferritin 11.       Hypothyroidism 05/13/2008     Priority: Low     was hyperthyroid - Hashimoto's and graves and had iodine treatment done at [facility] in early 2008.    Now hypothyroid - is following at [facility], on 125mcg levothyroxine - see scanned documents       Esophageal reflux 04/29/2008     Priority: Low     Chondromalacia of patella 02/27/2007     Priority: Low     Chronic leg pain 07/11/2006     Priority: Low     Has had chronic pain in the left groin and upper leg secondary to a  blood clot in the thigh, treated with neurontin without benefit, still has chronic pain since 2005, sharp shooting intermittantly.          Past Medical History:   Diagnosis Date     Acromioclavicular joint arthritis 01/25/2017     Acute bronchospasm 08/15/2016     Acute gouty arthritis 08/04/2014     Anxiety state, unspecified      Bipolar I disorder, most recent episode (or current) unspecified      Closed fracture of metatarsal bone 06/05/2007     COPD exacerbation (H) 10/29/2019     Depressive disorder, not elsewhere classified      DVT, lower extremity (H)      Headache 10/17/2014     History of stroke symptoms, stroke ruled out on imaging 2011    Admissions in 2011 and 2014 for stroke-like symptoms. Evaluated by neurology on both occasions, imaging not consistent with stroke. Thought to be due to conversion disorder in 2011.     Impingement syndrome of shoulder region 01/25/2017     Polycythemia, secondary     [place] HGB      Right shoulder pain 10/14/2014     Trochanteric bursitis of both hips 01/11/2016       Past Surgical History   I have reviewed this patient's surgical history and updated it with pertinent information if needed.  Past Surgical History:   Procedure Laterality Date     BONE MARROW BIOPSY, BONE SPECIMEN, NEEDLE/TROCAR N/A 11/17/2014    Procedure: BIOPSY BONE MARROW;  Surgeon: Hay Aaron MD;  Location: [facility]     D & C  10/26/2009    with uterine ablation     ENDOVASCULAR PLACEMENT VASCULAR DEVICE Left 2004    Left leg venous stent x 3     ESOPHAGOSCOPY, GASTROSCOPY, DUODENOSCOPY (EGD), COMBINED  04/21/2014    Procedure: Gastroscopy;  Surgeon: Moris Thomas MD;  Location: [facility]     HYSTERECTOMY, PAP NO LONGER INDICATED  01/04/2010     LAPAROSCOPIC CHOLECYSTECTOMY N/A 01/04/2019    Procedure: Laparoscopic cholecystectomy;  Surgeon: Aaron Siegel MD;  Location: [facility]     LITHOTRIPSY  2004    Lithotrypsy       Social History   I have reviewed this patient's social  history and updated it with pertinent information if needed.  Social History     Tobacco Use     Smoking status: Current Every Day Smoker     Packs/day: 0.50     Years: 43.00     Pack years: 21.50     Types: Cigarettes     Smokeless tobacco: Never Used     Tobacco comment: started at age 11.  Quit during pregnancyX4.  Quit 3 months ago.    Substance Use Topics     Alcohol use: No     Alcohol/week: 0.0 standard drinks     Comment: quit 1995     Drug use: No     Comment: past hx 22 years ago/ uppers downers, canibus       Family History   I have reviewed this patient's family history and updated it with pertinent information if needed.  Family History   Problem Relation Age of Onset     Hypertension Mother      Diabetes Mother      Blood Disease Mother      Heart Disease Mother         chf     Cerebrovascular Disease Mother      Hypertension Father      Cancer Father         lung cancer     Allergies Sister      Diabetes Sister      Depression Sister      Hypertension Sister        Prior to Admission Medications   Prior to Admission Medications   Prescriptions Last Dose Informant Patient Reported? Taking?   ADVAIR -21 MCG/ACT inhaler 11/7/2020 at am Self No Yes   Sig: INHALE TWO PUFFS BY MOUTH TWICE A DAY   Patient taking differently: Inhale 2 puffs into the lungs 2 times daily *Do not use more frequently than twice daily.*  Rinse mouth after use.   ARIPiprazole (ABILIFY) 10 MG tablet 11/5/2020 at am Self No Yes   Sig: TAKE ONE TABLET BY MOUTH ONCE DAILY   Patient taking differently: Take 10 mg by mouth daily    EPINEPHrine (ANY BX GENERIC EQUIV) 0.3 MG/0.3ML injection 2-pack never used Self No No   Sig: Inject 0.3 mLs (0.3 mg) into the muscle once as needed for anaphylaxis   VITAMIN D3 25 MCG (1000 UT) tablet 11/7/2020 at pm Self No Yes   Sig: TAKE ONE TABLET BY MOUTH ONCE DAILY   Patient taking differently: Take 1 tablet by mouth daily    acetaminophen (TYLENOL) 500 MG tablet 11/7/2020 at pm Self Yes Yes    Sig: Take 500-1,000 mg by mouth every 6 hours as needed for mild pain   albuterol (PROAIR HFA/PROVENTIL HFA/VENTOLIN HFA) 108 (90 Base) MCG/ACT inhaler 11/7/2020 at am Self No Yes   Sig: INHALE TWO PUFFS BY MOUTH EVERY 6 HOURS AS NEEDED FOR SHORTNESS OF BREATH DIFFICULTY BREATHING OR WHEEZING   Patient taking differently: Inhale 2 puffs into the lungs every 6 hours as needed for shortness of breath / dyspnea or wheezing    albuterol (PROVENTIL) (2.5 MG/3ML) 0.083% neb solution 11/5/2020 at am Self No Yes   Sig: Take 1 vial (2.5 mg) by nebulization every 6 hours as needed for shortness of breath / dyspnea or wheezing   alcohol swab prep pads  Self No No   Sig: Use to swab area of injection/mike as directed.   allopurinol (ZYLOPRIM) 300 MG tablet 11/5/2020 at am Self No Yes   Sig: TAKE ONE TABLET BY MOUTH ONCE DAILY   Patient taking differently: Take 300 mg by mouth daily    blood glucose (NO BRAND SPECIFIED) test strip 11/7/2020 at 2000  Self No Yes   Sig: Use to test blood sugar 2-3x/day. To accompany: Blood Glucose Monitor Brands: per insurance.   blood glucose calibration (NO BRAND SPECIFIED) solution  Self No Yes   Sig: To accompany: Blood Glucose Monitor Brands: per insurance.   blood glucose monitoring (NO BRAND SPECIFIED) meter device kit  Self No Yes   Sig: Use to test blood sugar daily or as directed. Preferred blood glucose meter OR supplies to accompany: Blood Glucose Monitor Brands: per insurance.   buPROPion (WELLBUTRIN XL) 150 MG 24 hr tablet 11/7/2020 at pm Self No Yes   Sig: TAKE 1 TABLET BY MOUTH EVERY EVENING   Patient taking differently: Take 150 mg by mouth every evening TAKE 1 TABLET BY MOUTH EVERY EVENING   cephALEXin (KEFLEX) 500 MG capsule 11/7/2020 at pm Self No Yes   Sig: Take 1 capsule (500 mg) by mouth 4 times daily for 7 days   furosemide (LASIX) 40 MG tablet More than a month at Unknown time Self No No   Sig: Take 1 tablet (40 mg) by mouth 2 times daily   gabapentin (NEURONTIN) 300 MG  capsule 11/7/2020 at hs Self No Yes   Sig: Take 1 capsule (300 mg) by mouth At Bedtime TAKE ONE CAPSULE BY MOUTH ONCE DAILY AT BEDTIME WITH A 600 MG TABLET FOR A TOTAL DOSE  MG   gabapentin (NEURONTIN) 600 MG tablet 11/7/2020 at pm Self No Yes   Sig: TAKE ONE TABLET BY MOUTH THREE TIMES A DAY   Patient taking differently: Take 600 mg by mouth 3 times daily TAKE ONE TABLET BY MOUTH THREE TIMES A DAY   ipratropium - albuterol 0.5 mg/2.5 mg/3 mL (DUONEB) 0.5-2.5 (3) MG/3ML neb solution 11/7/2020 at pm Self No Yes   Sig: Take 1 vial (3 mLs) by nebulization every 6 hours as needed for shortness of breath / dyspnea or wheezing   lamoTRIgine (LAMICTAL) 100 MG tablet 11/7/2020 at pm Self No Yes   Sig: TAKE ONE TABLET BY MOUTH ONCE DAILY   Patient taking differently: Take 100 mg by mouth daily    levothyroxine (SYNTHROID/LEVOTHROID) 175 MCG tablet 11/7/2020 at pm Self No Yes   Sig: TAKE ONE TABLET BY MOUTH ONCE DAILY   Patient taking differently: Take 175 mcg by mouth daily Separate oral administration of iron- or calcium-containing products and levothyroxine by at least 4 hours.   metFORMIN (GLUCOPHAGE) 1000 MG tablet 11/7/2020 at pm Self No Yes   Sig: TAKE ONE TABLET BY MOUTH TWICE A DAY WITH MEALS   Patient taking differently: Take 1,000 mg by mouth 2 times daily (with meals)    mometasone-formoterol (DULERA) 200-5 MCG/ACT oral inhaler 11/7/2020 at pm Self No Yes   Sig: Inhale 2 puffs into the lungs 2 times daily   omeprazole (PRILOSEC) 20 MG DR capsule 11/7/2020 at pm Self No Yes   Sig: Take 1 capsule (20 mg) by mouth 2 times daily   order for DME  Self No No   Sig: Equipment being ordered: INCONTINENCE PADS   QID   order for DME  Self No No   Sig: Equipment being ordered: DEPENDS SIZE LARGE   order for DME  Self Yes No   Sig: Equipment being ordered: CPAP  AIRSENSE 10  5-18 CM H20  SN# [id number]   DN# [id number]   order for DME  Self No No   Sig: Equipment being ordered: Nebulizer   order for DME  Self No No   Sig:  Equipment being ordered:x2 [employer] 30/40mmHg compression wraps with x2 extra prs of compression liners   potassium chloride ER (KLOR-CON M) 10 MEQ CR tablet 11/7/2020 at pm Self No Yes   Sig: TAKE ONE TABLET BY MOUTH ONCE DAILY   Patient taking differently: Take 10 mEq by mouth daily    pramipexole (MIRAPEX) 0.125 MG tablet  Self No No   Sig: Take 1-20 tablets (0.125-2.5 mg) by mouth At Bedtime TAKE 1-2 TABLETS BY MOUTH AT BEDTIME   simvastatin (ZOCOR) 20 MG tablet 11/7/2020 at pm Self No Yes   Sig: TAKE ONE TABLET BY MOUTH EVERY NIGHT AT BEDTIME   Patient taking differently: Take 20 mg by mouth At Bedtime    thin (NO BRAND SPECIFIED) lancets 11/7/2020 at pm Self No Yes   Sig: Use with lanceting device. To accompany: Blood Glucose Monitor Brands: per insurance.   traZODone (DESYREL) 150 MG tablet 11/7/2020 at pm Self No Yes   Sig: TAKE ONE TABLET BY MOUTH EVERY NIGHT AT BEDTIME   Patient taking differently: Take 150 mg by mouth At Bedtime    trimethoprim-polymyxin b (POLYTRIM) 86558-9.1 UNIT/ML-% ophthalmic solution 11/7/2020 at pm Self No Yes   Sig: Place 1-2 drops Into the left eye every 6 hours   warfarin ANTICOAGULANT (JANTOVEN ANTICOAGULANT) 5 MG tablet 11/7/2020 at pm Self No Yes   Sig: TAKE TWO AND ONE-HALF TABLETS BY MOUTH EVERY MONDAY, TWO TABLETS ON ALL OTHER DAYS OR AS DIRECTED BY ANTICOAGULATION CLINIC      Facility-Administered Medications: None     Allergies   Allergies   Allergen Reactions     Darvocet [Propoxyphene N-Apap] Anaphylaxis     Darvocet,Percocet      Percocet [Oxycodone-Acetaminophen] Anaphylaxis, Hives and Swelling     Has tolerated hydromorphone in the past.      Asa [Aspirin] Hives     Aspirin causes seizures and hives, throat swelling.   Has tolerated ketorolac in the past.        Bee      Ibuprofen Swelling     Throat swelling per patient      Lyrica [Pregabalin] Dizziness and Swelling     Povidone Iodine Rash     Reaction to topical betadine     Tape [Adhesive Tape] Rash        Physical Exam   Vital Signs: Temp: 98.1  F (36.7  C) Temp src: Oral BP: (!) 152/65 Pulse: 87   Resp: 22 SpO2: 98 % O2 Device: Nasal cannula Oxygen Delivery: 2 LPM  Weight: 247 lbs 9.23 oz    Constitutional: awake, alert, cooperative, no apparent distress, and appears stated age  Eyes: pupils equal, round and reactive to light, extra-ocular muscles intact and sclera clear  Respiratory: No increased work of breathing, good air exchange, no wheezing  Cardiovascular: regular rhythm, diminished pulses  GI: Distended, soft  Skin: no rashes  Musculoskeletal: There is no redness, warmth, or swelling of the joints.  3+ edema above knee  Neurologic: Awake, alert, oriented to name, place and time.  Cranial nerves II-XII are grossly intact.  Motor is 5 out of 5 bilaterally.  Cerebellar finger to nose, heel to shin intact.  Sensory is intact.  Babinski down going, Romberg negative, and gait is normal.  Neuropsychiatric: Level of consciousness: alert / normal  Affect: normal  Orientation: oriented to self, place, time and situation    Data   Data reviewed today: I reviewed all medications, new labs and imaging results over the last 24 hours. I personally reviewed the EKG tracing showing NSR and the chest x-ray image(s) showing poor lung volumes, possible interstitial infiltrat, focal increase in Right infrahilar area.    Lab Results   Component Value Date    TROPI 0.198 (HH) 11/08/2020    TROPI 0.197 (HH) 11/08/2020    TROPI <0.015 10/28/2019    TROPI <0.015 12/18/2018    TROPI <0.015 03/22/2018    TROPONIN 0.00 12/12/2011    TROPONIN <0.04 10/24/2007    TROPONIN <0.04 10/24/2007    TROPONIN <0.04 10/24/2007    TROPONIN <0.04 10/24/2007     Heart Failure Labs  Outpatient:   Lab Results   Component Value Date    NTBNP 61 10/19/2017       Inpatient:   Lab Results   Component Value Date    NTBNPI 1,868 (H) 11/08/2020    NTBNPI 168 01/22/2020    NTBNPI 58 11/08/2019    NTBNPI 735 10/28/2019    NTBNPI 12 03/22/2018      CMP  Recent Labs   Lab 11/08/20 0137 11/05/20 2034    143   POTASSIUM 3.6 3.5   CHLORIDE 104 104   CO2 30 35*   ANIONGAP 6 4   * 89   BUN 17 12   CR 0.86 0.68   GFRESTIMATED 77 >90   GFRESTBLACK 89 >90   HOOD 8.4* 9.1   PROTTOTAL 5.9* 6.2*   ALBUMIN 3.1* 3.4   BILITOTAL 0.6 0.6   ALKPHOS 104 117   AST 17 13   ALT 19 17     CBC  Recent Labs   Lab 11/08/20 0137 11/05/20 2034   WBC 6.6 6.0   RBC 4.92 5.13   HGB 11.3* 11.7   HCT 40.5 42.2   MCV 82 82   MCH 23.0* 22.8*   MCHC 27.9* 27.7*   RDW 20.1* 20.4*   * 102*     INR  Recent Labs   Lab 11/08/20 0137 11/05/20 2034   INR 1.32* 2.48*     Venous Blood Gas  Recent Labs   Lab 11/08/20 0137   PHV 7.36   PCO2V 61*   PO2V 16*   HCO3V 35*   O2PER 100%     Results for orders placed or performed during the hospital encounter of 11/08/20   XR Chest Port 1 View    Narrative    EXAM: CHEST SINGLE VIEW PORTABLE  LOCATION: [facility]  DATE/TIME: 11/8/2020 2:02 AM    INDICATION: Dyspnea.  COMPARISON: 03/14/2018.    FINDINGS: Mildly to moderately increased interstitial opacities in both lungs. Cardiomegaly. Atherosclerotic calcification in the thoracic aorta.      Impression    IMPRESSION: Mildly to moderately increased interstitial opacities in the lungs, like representing interstitial pulmonary edema.    US Lower Extremity Venous Duplex Bilateral    Narrative    EXAM: US LOWER EXTREMITY VENOUS DUPLEX BILATERAL  LOCATION: [facility]  DATE/TIME: 11/8/2020 3:18 AM    INDICATION: Lower extremity swelling.  COMPARISON: None.  TECHNIQUE: Venous Duplex ultrasound of bilateral lower extremities with and without compression, augmentation and duplex. Color flow and spectral Doppler with waveform analysis performed.    FINDINGS: Exam includes the common femoral, femoral, popliteal veins as well as segmentally visualized deep calf veins and greater saphenous vein.     RIGHT: No deep vein thrombosis. No superficial thrombophlebitis. No  popliteal cyst.    LEFT: No deep vein thrombosis. No superficial thrombophlebitis. No popliteal cyst.      Impression    IMPRESSION:  1.  No deep venous thrombosis in the bilateral lower extremities.     *Note: Due to a large number of results and/or encounters for the requested time period, some results have not been displayed. A complete set of results can be found in Results Review.

## 2020-11-09 ENCOUNTER — APPOINTMENT (OUTPATIENT)
Dept: CARDIOLOGY | Facility: CLINIC | Age: 55
End: 2020-11-09
Attending: INTERNAL MEDICINE
Payer: COMMERCIAL

## 2020-11-09 LAB
ALBUMIN SERPL-MCNC: 3.2 G/DL (ref 3.4–5)
ALBUMIN UR-MCNC: NEGATIVE MG/DL
ALP SERPL-CCNC: 97 U/L (ref 40–150)
ALT SERPL W P-5'-P-CCNC: 20 U/L (ref 0–50)
ANION GAP SERPL CALCULATED.3IONS-SCNC: 3 MMOL/L (ref 3–14)
APPEARANCE UR: CLEAR
AST SERPL W P-5'-P-CCNC: 14 U/L (ref 0–45)
BASE EXCESS BLDV CALC-SCNC: 9.9 MMOL/L
BILIRUB SERPL-MCNC: 0.8 MG/DL (ref 0.2–1.3)
BILIRUB UR QL STRIP: NEGATIVE
BUN SERPL-MCNC: 16 MG/DL (ref 7–30)
CALCIUM SERPL-MCNC: 9 MG/DL (ref 8.5–10.1)
CHLORIDE SERPL-SCNC: 105 MMOL/L (ref 94–109)
CO2 SERPL-SCNC: 35 MMOL/L (ref 20–32)
COLOR UR AUTO: YELLOW
CREAT SERPL-MCNC: 0.71 MG/DL (ref 0.52–1.04)
ERYTHROCYTE [DISTWIDTH] IN BLOOD BY AUTOMATED COUNT: 20.6 % (ref 10–15)
GFR SERPL CREATININE-BSD FRML MDRD: >90 ML/MIN/{1.73_M2}
GLUCOSE BLDC GLUCOMTR-MCNC: 113 MG/DL (ref 70–99)
GLUCOSE BLDC GLUCOMTR-MCNC: 118 MG/DL (ref 70–99)
GLUCOSE BLDC GLUCOMTR-MCNC: 137 MG/DL (ref 70–99)
GLUCOSE BLDC GLUCOMTR-MCNC: 162 MG/DL (ref 70–99)
GLUCOSE SERPL-MCNC: 97 MG/DL (ref 70–99)
GLUCOSE UR STRIP-MCNC: NEGATIVE MG/DL
HCO3 BLDV-SCNC: 36 MMOL/L (ref 21–28)
HCT VFR BLD AUTO: 39.3 % (ref 35–47)
HGB BLD-MCNC: 10.7 G/DL (ref 11.7–15.7)
HGB UR QL STRIP: NEGATIVE
INR PPP: 1.2 (ref 0.86–1.14)
KETONES UR STRIP-MCNC: NEGATIVE MG/DL
LEUKOCYTE ESTERASE UR QL STRIP: NEGATIVE
MCH RBC QN AUTO: 22.8 PG (ref 26.5–33)
MCHC RBC AUTO-ENTMCNC: 27.2 G/DL (ref 31.5–36.5)
MCV RBC AUTO: 84 FL (ref 78–100)
NITRATE UR QL: NEGATIVE
O2/TOTAL GAS SETTING VFR VENT: ABNORMAL %
PCO2 BLDV: 56 MM HG (ref 40–50)
PH BLDV: 7.42 PH (ref 7.32–7.43)
PH UR STRIP: 6 PH (ref 5–7)
PLATELET # BLD AUTO: 110 10E9/L (ref 150–450)
PO2 BLDV: 38 MM HG (ref 25–47)
POTASSIUM SERPL-SCNC: 3.5 MMOL/L (ref 3.4–5.3)
PROT SERPL-MCNC: 5.9 G/DL (ref 6.8–8.8)
RBC # BLD AUTO: 4.69 10E12/L (ref 3.8–5.2)
SODIUM SERPL-SCNC: 143 MMOL/L (ref 133–144)
SOURCE: NORMAL
SP GR UR STRIP: 1.01 (ref 1–1.03)
T4 FREE SERPL-MCNC: 1.08 NG/DL (ref 0.76–1.46)
TSH SERPL DL<=0.005 MIU/L-ACNC: 5.03 MU/L (ref 0.4–4)
UROBILINOGEN UR STRIP-MCNC: 2 MG/DL (ref 0–2)
WBC # BLD AUTO: 6 10E9/L (ref 4–11)

## 2020-11-09 PROCEDURE — 99233 SBSQ HOSP IP/OBS HIGH 50: CPT | Performed by: INTERNAL MEDICINE

## 2020-11-09 PROCEDURE — 250N000011 HC RX IP 250 OP 636: Performed by: INTERNAL MEDICINE

## 2020-11-09 PROCEDURE — 85610 PROTHROMBIN TIME: CPT | Performed by: INTERNAL MEDICINE

## 2020-11-09 PROCEDURE — 80053 COMPREHEN METABOLIC PANEL: CPT | Performed by: INTERNAL MEDICINE

## 2020-11-09 PROCEDURE — 255N000002 HC RX 255 OP 636: Performed by: INTERNAL MEDICINE

## 2020-11-09 PROCEDURE — 250N000013 HC RX MED GY IP 250 OP 250 PS 637: Performed by: INTERNAL MEDICINE

## 2020-11-09 PROCEDURE — 81003 URINALYSIS AUTO W/O SCOPE: CPT | Performed by: INTERNAL MEDICINE

## 2020-11-09 PROCEDURE — 999N001017 HC STATISTIC GLUCOSE BY METER IP

## 2020-11-09 PROCEDURE — 93306 TTE W/DOPPLER COMPLETE: CPT | Mod: 26 | Performed by: INTERNAL MEDICINE

## 2020-11-09 PROCEDURE — 120N000001 HC R&B MED SURG/OB

## 2020-11-09 PROCEDURE — 36415 COLL VENOUS BLD VENIPUNCTURE: CPT | Performed by: INTERNAL MEDICINE

## 2020-11-09 PROCEDURE — 85027 COMPLETE CBC AUTOMATED: CPT | Performed by: INTERNAL MEDICINE

## 2020-11-09 PROCEDURE — 84439 ASSAY OF FREE THYROXINE: CPT | Performed by: INTERNAL MEDICINE

## 2020-11-09 PROCEDURE — 999N000208 ECHOCARDIOGRAM COMPLETE

## 2020-11-09 PROCEDURE — 84443 ASSAY THYROID STIM HORMONE: CPT | Performed by: INTERNAL MEDICINE

## 2020-11-09 PROCEDURE — 82803 BLOOD GASES ANY COMBINATION: CPT | Performed by: INTERNAL MEDICINE

## 2020-11-09 RX ORDER — PRAMIPEXOLE DIHYDROCHLORIDE 0.12 MG/1
0.12 TABLET ORAL 3 TIMES DAILY
Status: DISCONTINUED | OUTPATIENT
Start: 2020-11-09 | End: 2020-11-09

## 2020-11-09 RX ORDER — FUROSEMIDE 10 MG/ML
40 INJECTION INTRAMUSCULAR; INTRAVENOUS EVERY 12 HOURS
Status: DISCONTINUED | OUTPATIENT
Start: 2020-11-09 | End: 2020-11-11 | Stop reason: HOSPADM

## 2020-11-09 RX ORDER — PRAMIPEXOLE DIHYDROCHLORIDE 0.12 MG/1
0.12 TABLET ORAL DAILY
Status: DISCONTINUED | OUTPATIENT
Start: 2020-11-09 | End: 2020-11-11 | Stop reason: HOSPADM

## 2020-11-09 RX ADMIN — OMEPRAZOLE 20 MG: 20 CAPSULE, DELAYED RELEASE ORAL at 09:22

## 2020-11-09 RX ADMIN — GABAPENTIN 600 MG: 600 TABLET, FILM COATED ORAL at 19:59

## 2020-11-09 RX ADMIN — DOCUSATE SODIUM AND SENNOSIDES 2 TABLET: 8.6; 5 TABLET ORAL at 19:59

## 2020-11-09 RX ADMIN — GABAPENTIN 600 MG: 600 TABLET, FILM COATED ORAL at 09:24

## 2020-11-09 RX ADMIN — HUMAN ALBUMIN MICROSPHERES AND PERFLUTREN 2 ML: 10; .22 INJECTION, SOLUTION INTRAVENOUS at 09:04

## 2020-11-09 RX ADMIN — ENOXAPARIN SODIUM 40 MG: 40 INJECTION SUBCUTANEOUS at 16:09

## 2020-11-09 RX ADMIN — PRAMIPEXOLE DIHYDROCHLORIDE 0.12 MG: 0.12 TABLET ORAL at 11:31

## 2020-11-09 RX ADMIN — LEVOTHYROXINE SODIUM 175 MCG: 0.15 TABLET ORAL at 16:57

## 2020-11-09 RX ADMIN — FUROSEMIDE 40 MG: 10 INJECTION, SOLUTION INTRAMUSCULAR; INTRAVENOUS at 20:56

## 2020-11-09 RX ADMIN — TRAZODONE HYDROCHLORIDE 150 MG: 100 TABLET ORAL at 21:01

## 2020-11-09 RX ADMIN — DOCUSATE SODIUM AND SENNOSIDES 2 TABLET: 8.6; 5 TABLET ORAL at 09:22

## 2020-11-09 RX ADMIN — ARIPIPRAZOLE 10 MG: 10 TABLET ORAL at 09:22

## 2020-11-09 RX ADMIN — OMEPRAZOLE 20 MG: 20 CAPSULE, DELAYED RELEASE ORAL at 19:59

## 2020-11-09 RX ADMIN — GABAPENTIN 600 MG: 600 TABLET, FILM COATED ORAL at 13:37

## 2020-11-09 RX ADMIN — ALLOPURINOL 300 MG: 300 TABLET ORAL at 09:22

## 2020-11-09 RX ADMIN — HYDROCODONE BITARTRATE AND ACETAMINOPHEN 2 TABLET: 5; 325 TABLET ORAL at 11:30

## 2020-11-09 RX ADMIN — SIMVASTATIN 20 MG: 20 TABLET, FILM COATED ORAL at 21:01

## 2020-11-09 RX ADMIN — FUROSEMIDE 40 MG: 10 INJECTION, SOLUTION INTRAMUSCULAR; INTRAVENOUS at 09:27

## 2020-11-09 RX ADMIN — LAMOTRIGINE 100 MG: 100 TABLET ORAL at 16:57

## 2020-11-09 RX ADMIN — BUPROPION HYDROCHLORIDE 150 MG: 150 TABLET, FILM COATED, EXTENDED RELEASE ORAL at 16:57

## 2020-11-09 RX ADMIN — WARFARIN SODIUM 12.5 MG: 7.5 TABLET ORAL at 16:58

## 2020-11-09 RX ADMIN — POLYETHYLENE GLYCOL 3350 17 G: 17 POWDER, FOR SOLUTION ORAL at 09:21

## 2020-11-09 ASSESSMENT — MIFFLIN-ST. JEOR: SCORE: 1624.75

## 2020-11-09 ASSESSMENT — ACTIVITIES OF DAILY LIVING (ADL)
ADLS_ACUITY_SCORE: 13

## 2020-11-09 NOTE — UTILIZATION REVIEW
"    Admission Status; Secondary Review Determination         Under the authority of the Utilization Management Committee, the utilization review process indicated a secondary review on the above patient.  The review outcome is based on review of the medical records, discussions with staff, and applying clinical experience noted on the date of the review.        (x)      Inpatient Status Appropriate - This patient's medical care is consistent with medical management for inpatient care and reasonable inpatient medical practice.      () Observation Status Appropriate - This patient does not meet hospital inpatient criteria and is placed in observation status. If this patient's primary payer is Medicare and was admitted as an inpatient, Condition Code 44 should be used and patient status changed to \"observation\".   () Admission Status NOT Appropriate - This patient's medical care is not consistent with medical management for Inpatient or Observation Status.          RATIONALE FOR DETERMINATION     \"Bia Bill is a 54 year old female with Bipolar disorder, morbid obesity, untreated GILES/obesity hypoventilation syndrome, asthma, history of deep vein thrombosis, chronic lower extremities edema, possible history of diastolic congestive heart failure kianna is admitted on 11/8/2020. She presents with 2 weeks of increased abdominal pain and distention, worsened lower extremity edema, and shortness of breath.\"    Pt is suspected of having acute CHF and is on IV lasix bid. Her TTE confirms elevated RV pressures. The patient is likely to stay > 2 [place] for IV lasix and monitoring of electrolytes and hence, inpatient status is appropriate.      The severity of illness, intensity of service provided, expected LOS and risk for adverse outcome make the care complex, high risk and appropriate for hospital admission.        The information on this document is developed by the utilization review team in order for the business office to " ensure compliance.  This only denotes the appropriateness of proper admission status and does not reflect the quality of care rendered.         The definitions of Inpatient Status and Observation Status used in making the determination above are those provided in the CMS Coverage Manual, Chapter 1 and Chapter 6, section 70.4.      Sincerely,     ANDREA GARCIA MD    Physician Advisor  Utilization Review/ Case Management  [facility].

## 2020-11-09 NOTE — PHARMACY-ANTICOAGULATION SERVICE
Clinical Pharmacy - Warfarin Dosing Consult     Pharmacy has been consulted to manage this patient s warfarin therapy.  Indication: DVT/PE Prophylaxis  Therapy Goal: INR 2-3  Provider/Team: DERREK  Warfarin Prior to Admission: Yes  Warfarin PTA Regimen: 12.5mg on Monday, 10mg rest of week.  Recent documented change in oral intake/nutrition: No  Dose Comments: 12.5mg for subtherapeutic INR with CHF    INR   Date Value Ref Range Status   11/08/2020 1.32 (H) 0.86 - 1.14 Final   11/05/2020 2.48 (H) 0.86 - 1.14 Final       Recommend warfarin 12.5 mg today.  Pharmacy will monitor Bia Bill daily and order warfarin doses to achieve specified goal.      Please contact pharmacy as soon as possible if the warfarin needs to be held for a procedure or if the warfarin goals change.

## 2020-11-09 NOTE — PLAN OF CARE
Patient receiving Norco for abdomen discomfort, oxygen at 2 liters per nasal cannula with saturation of 92%.  Respiratory called to qualify for home oxygen.

## 2020-11-09 NOTE — PROGRESS NOTES
[facility] Internal Medicine Progress Note     Date of Service (when I saw the patient): 11/09/2020    REASON FOR ADMISSION / INTERVAL HISTORY:  Bia Bill is a 54 year old female with Bipolar disorder, morbid obesity, untreated GILES/obesity hypoventilation syndrome, asthma, history of deep vein thrombosis, chronic lower extremities edema, possible history of diastolic congestive heart failure kianna is admitted on 11/8/2020. She presents with 2 weeks of increased abdominal pain and distention, worsened lower extremity edema, and shortness of breath    ECHO 11/9-There is mild to moderate concentric left ventricular hypertrophy.  Left ventricular systolic function is normal.  Flattened septum is consistent with RV pressure overload.  The right ventricle is mildly dilated.  The right ventricular systolic function is mild to moderately reduced.  The right atrium is mildly dilated.  Right ventricular systolic pressure is elevated, consistent with moderate  pulmonary hypertension.  IVC diameter >2.1 cm collapsing <50% with sniff suggests a high RA pressure  estimated at 15 mmHg or greater.  Study c/w acute vs chronic cor pulmonale       ASSESSMENT/PLAN:     Acute congestive heart failure, suspect right sided  Likely cor pulmonale due to obesity and untreated hypoventilation syndrome. BNP 1861. Covid negative. CXR shows some interstitial prominence and possibly a right infrahilar area of consolidation, but may be crowding and atelectasis. She is not requiring O2, but was placed on O2 presumably for symptoms.   Received Lasix 40, 40, and 80 IV in ED, patient reports good diuresis. On lasix 40mg bid at home. ECHO as above  - Continue Lasix 40 IV q12 hrs. Continue to hold home lasix.        Elevated troponin   Clinical presentation not consistent with acute coronary syndrome. Lexiscan 5/2018 negative for ischemia.ECHO as  Above.   -diurese and repeat ECHO in few weeks       GILES (Obstructive Sleep Apnea)/ Obesity  "Hypoventilation     Chronic respiratory failure with hypercapnia   Patient was recommended to start Bilevel PAP per sleep study 1/2020, however she never had follow-up and never got started on PAP  -will have RT set up for BIPAP/ CPAP .        RLS (restless legs syndrome)  Poor control per patient. Has history of erythrocytosis and has had phlebotomy in past because of 'PVD' and \"Strokes\". 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 Consider iron replacement after discussion with Hematology-can be done as OP.       Diabetes mellitus 2  A1c 5.6 . BS <150  - Hold usual metformin 1000 BID  -  Medium insulin sliding scale       Chronic Leg Pain  - Continue Neurontin 600 TID (per med rec)       Hyperlipidemia   - Continue simvasastin 20       Moderate mixed bipolar I disorder   - Continue Abilify 10, bupropion at bedtime, Lamictal 100, trazodone 150       Mild persistent asthma  - Continue usual Dulera or Advair  After meds reconciled  - Continue albuterol MDI prn  - Hold  Usual nebs       History of deep venous thrombosis   - Continue warfarin, pharmacy to dose       Secondary polycythemia  Hgb stable       Chronic Thrombocytopenia  Stable       Hypothyroidism  TSH 3.34 6/2020  - Continue levothyroxine (SYNTHROID/LEVOTHROID) 175 MCG        Gout  - Continue allopurinol       Morbid obesity (H) (6/17/2015)  Complicates cares, diagnosis     COVID PUI  Presentation appears more consistent with congestive heart failure than COVID    - COVID PCR negative       VICKI DELAROSA MD   Pg [phone]    DVT Prhylaxis: Warfarin  Code Status: Full Code    ROS:  As described in A/P and Exam.  Otherwise ALL are  negative.    PHYSICAL EXAM:  All vitals have been reviewed    Blood pressure 97/40, pulse 65, temperature 98.1  F (36.7  C), temperature source Oral, resp. rate 16, height 1.473 m (4' 10\"), weight 113 kg " (249 lb 1.9 oz), last menstrual period 2009, SpO2 94 %, not currently breastfeeding.    I/O this shift:  In: 480 [P.O.:480]  Out: 600 [Urine:600]    GENERAL APPEARANCE: obese , alert and mildly SOB  EYES: conjunctiva clear, eyes grossly normal  HENT: external ears and nose normal   NECK: supple, no masses or adenopathy  RESP: lungs -crackles bases - no rales, rhonchi or wheezes  CV: regular rate and rhythm, normal S1 S2, no S3 or S4 and no murmur, click or rub   ABDOMEN: soft, nontender, no HSM or masses and bowel sounds normal  MS: no clubbing, cyanosis; 1+ edema  SKIN: clear without significant rashes or lesions  NEURO: -non-focal moves all 4 extr    ROUTINE  LABS (Last four results)  CMP  Recent Labs   Lab 20    140 143   POTASSIUM 3.5 3.6 3.5   CHLORIDE 105 104 104   CO2 35* 30 35*   ANIONGAP 3 6 4   GLC 97 156* 89   BUN 16 17 12   CR 0.71 0.86 0.68   GFRESTIMATED >90 77 >90   GFRESTBLACK >90 89 >90   HOOD 9.0 8.4* 9.1   PROTTOTAL 5.9* 5.9* 6.2*   ALBUMIN 3.2* 3.1* 3.4   BILITOTAL 0.8 0.6 0.6   ALKPHOS 97 104 117   AST 14 17 13   ALT 20 19 17     CBC  Recent Labs   Lab 20   WBC 6.0 6.6 6.0   RBC 4.69 4.92 5.13   HGB 10.7* 11.3* 11.7   HCT 39.3 40.5 42.2   MCV 84 82 82   MCH 22.8* 23.0* 22.8*   MCHC 27.2* 27.9* 27.7*   RDW 20.6* 20.1* 20.4*   * 120* 102*     INR  Recent Labs   Lab 20   INR 1.20* 1.32* 2.48*     Arterial Blood Gas  Recent Labs   Lab 20/20  0137   O2PER Ra 100%       Recent Results (from the past 24 hour(s))   Echocardiogram Complete    Narrative    [id number]  [id number]  [id number]  [id number]^KACEY^W           [facility]  Echocardiography Laboratory  [address] [place] [address]        Name: EVERETT MORALES  MRN: [id number]  : 1965  Study Date: 2020 08:28 AM  Age: 54 yrs  Gender: Female  Patient  Location: [facility]  Reason For Study: CHF  Ordering Physician: KACEY JHA  Referring Physician: Jose Ramon [facility]  Performed By: Ruba Weller RDCS     BSA: 2.2 m2  Height: 68 in  Weight: 249 lb  HR: 63  BP: 133/50 mmHg  _____________________________________________________________________________  __        Procedure  Complete Portable Echo Adult. Optison (NDC #[id number]) given intravenously.  _____________________________________________________________________________  __        Interpretation Summary     There is mild to moderate concentric left ventricular hypertrophy.  Left ventricular systolic function is normal.  Flattened septum is consistent with RV pressure overload.  The right ventricle is mildly dilated.  The right ventricular systolic function is mild to moderately reduced.  The right atrium is mildly dilated.  Right ventricular systolic pressure is elevated, consistent with moderate  pulmonary hypertension.  IVC diameter >2.1 cm collapsing <50% with sniff suggests a high RA pressure  estimated at 15 mmHg or greater.  Study c/w acute vs chronic cor pulmonale  _____________________________________________________________________________  __        Left Ventricle  The left ventricle is normal in size. There is mild to moderate concentric  left ventricular hypertrophy. Left ventricular systolic function is normal.  The visual ejection fraction is estimated at 55-60%. Left ventricular  diastolic function is indeterminate. Flattened septum is consistent with RV  pressure overload. There is no thrombus seen in the left ventricle.     Right Ventricle  The right ventricle is mildly dilated. The right ventricular systolic function  is mild to moderately reduced.     Atria  The left atrium is borderline dilated. The right atrium is mildly dilated.     Mitral Valve  The mitral valve leaflets appear normal. There is no evidence of stenosis,  fluttering, or prolapse.        Tricuspid Valve  There is trace  tricuspid regurgitation. Right ventricular systolic pressure is  elevated, consistent with moderate pulmonary hypertension. IVC diameter >2.1  cm collapsing <50% with sniff suggests a high RA pressure estimated at 15 mmHg  or greater.     Aortic Valve  The aortic valve is trileaflet.     Pulmonic Valve  The pulmonic valve is not well seen, but is grossly normal.     Vessels  Normal size aorta.     Pericardium  The pericardium appears normal.        Rhythm  Sinus rhythm was noted.  _____________________________________________________________________________  __  MMode/2D Measurements & Calculations  IVSd: 1.3 cm     LVIDd: 4.3 cm  LVIDs: 2.5 cm  LVPWd: 1.5 cm  FS: 41.3 %  LV mass(C)d: 234.9 grams  LV mass(C)dI: 104.7 grams/m2  Ao root diam: 3.4 cm  LA dimension: 3.8 cm  asc Aorta Diam: 3.4 cm  LA/Ao: 1.1  LA Volume (BP): 54.0 ml  LA Volume Index (BP): 24.1 ml/m2  RWT: 0.69           Doppler Measurements & Calculations  MV E max pierre: 126.9 cm/sec  MV A max pierre: 89.3 cm/sec  MV E/A: 1.4  MV dec time: 0.25 sec  PA acc time: 0.10 sec  TR max pierre: 359.5 cm/sec  TR max P.7 mmHg  E/E' av.1  Lateral E/e': 11.3  Medial E/e': 15.0           _____________________________________________________________________________  __           Report approved by: Gali Sorensen 2020 11:37 AM

## 2020-11-09 NOTE — CONSULTS
Care Management Initial Consult    General Information  Assessment completed with: Patient, Norah     Primary Care Provider verified and updated as needed: Yes   Readmission within the last 30 days: no previous admission in last 30 days         Advance Care Planning: Advance Care Planning Reviewed: no concerns identified          Communication Assessment  Patient's communication style: spoken language (English or Bilingual)    Hearing Difficulty or Deaf: no   Wear Glasses or Blind: yes    Cognitive  Cognitive/Neuro/Behavioral: WDL                      Living Environment:   People in home: friend(s)     Current living Arrangements: mobile home      Able to return to prior arrangements: yes       Family/Social Support:  Care provided by: self  Provides care for: no one     Children;Sibling(s);Other (specify)(Friend)          Description of Support System:   Supportive, Involved        Current Resources:   Skilled Home Care Services:  None  Community Resources:  None  Equipment currently used at home: cane, straight;grab bar, tub/shower;shower chair;walker, rolling;wheelchair, manual     Employment/Financial:  Employment Status: disabled        Financial Concerns: No concerns identified           Lifestyle & Psychosocial Needs:     Social Needs     Financial resource strain: Not very hard     Food insecurity     Worry: Never true     Inability: Never true     Transportation needs     Medical: No     Non-medical: No     Socioeconomic History     Marital status:      Spouse name: Not on file     Number of children: Not on file     Years of education: Not on file     Highest education level: Not on file   Occupational History     Occupation: Homemaker     Tobacco Use     Smoking status: Current Every Day Smoker     Packs/day: 0.50     Years: 43.00     Pack years: 21.50     Types: Cigarettes     Smokeless tobacco: Never Used     Tobacco comment: started at age 11.  Quit during pregnancyX4.  Quit 3 months ago.     Substance and Sexual Activity     Alcohol use: No     Alcohol/week: 0.0 standard drinks     Comment: quit 1995     Drug use: No     Comment: past hx 22 years ago/ uppers downers, canibus     Sexual activity: Not Currently     Birth control/protection: Surgical          Mental Health Status:  Mental Health Status: Current Concern  Mental Health Management: Psychiatrist      Additional Information:    Spoke with the patient regarding discharge planning, introduced self and role.    The patient lives with a friend independently in the community in a mobile home.  Her daughter, sister and friends are supportive and available if needed.    She has the following services:    [facility] - Amol Collins (Pt does not have phone #).    [facility] Care Coordination    [facility] LINDA Coto [phone]  She is followed by Kd Morris at [facility] for bipolar disorder.    There are no discharge needs identified.    Plan:  Home      Rhiannon Landon RN

## 2020-11-09 NOTE — PLAN OF CARE
"Pt alert, oriented, independent. Pt denies nausea. Prn norco given for abdominal pain x 1. IV saline locked. On RA. Unable to collect urine overnight.  Generalized edema +2. Blood glucose monitoring. On Telemetry. Echo today. /50   Pulse 86   Temp 97.1  F (36.2  C) (Oral)   Resp 16   Ht 1.473 m (4' 10\")   Wt 112.3 kg (247 lb 9.2 oz)   LMP 09/27/2009   SpO2 92%   BMI 51.74 kg/m      "

## 2020-11-10 LAB
ANION GAP SERPL CALCULATED.3IONS-SCNC: 3 MMOL/L (ref 3–14)
BUN SERPL-MCNC: 16 MG/DL (ref 7–30)
CALCIUM SERPL-MCNC: 9.2 MG/DL (ref 8.5–10.1)
CHLORIDE SERPL-SCNC: 103 MMOL/L (ref 94–109)
CO2 SERPL-SCNC: 35 MMOL/L (ref 20–32)
CREAT SERPL-MCNC: 0.86 MG/DL (ref 0.52–1.04)
GFR SERPL CREATININE-BSD FRML MDRD: 77 ML/MIN/{1.73_M2}
GLUCOSE BLDC GLUCOMTR-MCNC: 146 MG/DL (ref 70–99)
GLUCOSE SERPL-MCNC: 111 MG/DL (ref 70–99)
INR PPP: 1.28 (ref 0.86–1.14)
POTASSIUM SERPL-SCNC: 3.7 MMOL/L (ref 3.4–5.3)
SODIUM SERPL-SCNC: 141 MMOL/L (ref 133–144)

## 2020-11-10 PROCEDURE — 36415 COLL VENOUS BLD VENIPUNCTURE: CPT | Performed by: INTERNAL MEDICINE

## 2020-11-10 PROCEDURE — 250N000011 HC RX IP 250 OP 636: Performed by: INTERNAL MEDICINE

## 2020-11-10 PROCEDURE — 120N000001 HC R&B MED SURG/OB

## 2020-11-10 PROCEDURE — 250N000013 HC RX MED GY IP 250 OP 250 PS 637: Performed by: INTERNAL MEDICINE

## 2020-11-10 PROCEDURE — 80048 BASIC METABOLIC PNL TOTAL CA: CPT | Performed by: INTERNAL MEDICINE

## 2020-11-10 PROCEDURE — 99233 SBSQ HOSP IP/OBS HIGH 50: CPT | Performed by: INTERNAL MEDICINE

## 2020-11-10 PROCEDURE — 85610 PROTHROMBIN TIME: CPT | Performed by: INTERNAL MEDICINE

## 2020-11-10 PROCEDURE — 999N001017 HC STATISTIC GLUCOSE BY METER IP

## 2020-11-10 RX ORDER — WARFARIN SODIUM 7.5 MG/1
15 TABLET ORAL
Status: COMPLETED | OUTPATIENT
Start: 2020-11-10 | End: 2020-11-10

## 2020-11-10 RX ADMIN — LAMOTRIGINE 100 MG: 100 TABLET ORAL at 18:04

## 2020-11-10 RX ADMIN — BUPROPION HYDROCHLORIDE 150 MG: 150 TABLET, FILM COATED, EXTENDED RELEASE ORAL at 18:02

## 2020-11-10 RX ADMIN — GABAPENTIN 600 MG: 600 TABLET, FILM COATED ORAL at 14:26

## 2020-11-10 RX ADMIN — OMEPRAZOLE 20 MG: 20 CAPSULE, DELAYED RELEASE ORAL at 20:38

## 2020-11-10 RX ADMIN — ALLOPURINOL 300 MG: 300 TABLET ORAL at 08:50

## 2020-11-10 RX ADMIN — PRAMIPEXOLE DIHYDROCHLORIDE 0.12 MG: 0.12 TABLET ORAL at 08:49

## 2020-11-10 RX ADMIN — POLYETHYLENE GLYCOL 3350 17 G: 17 POWDER, FOR SOLUTION ORAL at 08:48

## 2020-11-10 RX ADMIN — FUROSEMIDE 40 MG: 10 INJECTION, SOLUTION INTRAMUSCULAR; INTRAVENOUS at 08:50

## 2020-11-10 RX ADMIN — OMEPRAZOLE 20 MG: 20 CAPSULE, DELAYED RELEASE ORAL at 08:49

## 2020-11-10 RX ADMIN — FUROSEMIDE 40 MG: 10 INJECTION, SOLUTION INTRAMUSCULAR; INTRAVENOUS at 20:38

## 2020-11-10 RX ADMIN — TRAZODONE HYDROCHLORIDE 150 MG: 100 TABLET ORAL at 22:10

## 2020-11-10 RX ADMIN — WARFARIN SODIUM 15 MG: 7.5 TABLET ORAL at 18:02

## 2020-11-10 RX ADMIN — GABAPENTIN 600 MG: 600 TABLET, FILM COATED ORAL at 20:38

## 2020-11-10 RX ADMIN — DOCUSATE SODIUM AND SENNOSIDES 1 TABLET: 8.6; 5 TABLET ORAL at 08:49

## 2020-11-10 RX ADMIN — LEVOTHYROXINE SODIUM 175 MCG: 0.15 TABLET ORAL at 18:02

## 2020-11-10 RX ADMIN — HYDROCODONE BITARTRATE AND ACETAMINOPHEN 1 TABLET: 5; 325 TABLET ORAL at 08:49

## 2020-11-10 RX ADMIN — SIMVASTATIN 20 MG: 20 TABLET, FILM COATED ORAL at 22:10

## 2020-11-10 RX ADMIN — ENOXAPARIN SODIUM 40 MG: 40 INJECTION SUBCUTANEOUS at 18:02

## 2020-11-10 RX ADMIN — ARIPIPRAZOLE 10 MG: 10 TABLET ORAL at 08:55

## 2020-11-10 RX ADMIN — GABAPENTIN 600 MG: 600 TABLET, FILM COATED ORAL at 08:50

## 2020-11-10 ASSESSMENT — MIFFLIN-ST. JEOR: SCORE: 1607.75

## 2020-11-10 ASSESSMENT — ACTIVITIES OF DAILY LIVING (ADL)
ADLS_ACUITY_SCORE: 13
ADLS_ACUITY_SCORE: 13
ADLS_ACUITY_SCORE: 14
ADLS_ACUITY_SCORE: 14
ADLS_ACUITY_SCORE: 13
ADLS_ACUITY_SCORE: 13

## 2020-11-10 NOTE — PLAN OF CARE
AOx4, up independent in room. Blood sugars 162 HS; 146 0200. IV SL, VSS, rested comfortably throughout shift.

## 2020-11-10 NOTE — PROGRESS NOTES
[facility] Internal Medicine Progress Note     Date of Service (when I saw the patient): 11/10/2020    REASON FOR ADMISSION / INTERVAL HISTORY:  Bia Bill is a 54 year old female with Bipolar disorder, morbid obesity, untreated GILES/obesity hypoventilation syndrome, asthma, history of deep vein thrombosis, chronic lower extremities edema, possible history of diastolic congestive heart failure kianna is admitted on 11/8/2020. She presents with 2 weeks of increased abdominal pain and distention, worsened lower extremity edema, and shortness of breath    ECHO 11/9-There is mild to moderate concentric left ventricular hypertrophy.  Left ventricular systolic function is normal.  Flattened septum is consistent with RV pressure overload.  The right ventricle is mildly dilated.  The right ventricular systolic function is mild to moderately reduced.  The right atrium is mildly dilated.  Right ventricular systolic pressure is elevated, consistent with moderate  pulmonary hypertension.  IVC diameter >2.1 cm collapsing <50% with sniff suggests a high RA pressure  estimated at 15 mmHg or greater.  Study c/w acute vs chronic cor pulmonale       ASSESSMENT/PLAN:     Acute congestive heart failure, suspect right sided  Likely cor pulmonale due to obesity and untreated hypoventilation syndrome. BNP 1861. Covid negative. CXR shows some interstitial prominence and possibly a right infrahilar area of consolidation, but may be crowding and atelectasis. She is not requiring O2, but was placed on O2 presumably for symptoms.   Received Lasix 40, 40, and 80 IV in ED, patient reports good diuresis. On lasix 40mg bid at home. ECHO as above. Started lasix 40mg iv bid 11/9. Good UO, wt down 4 lbs, breathing a little better but still sob with activity  - Continue Lasix 40 IV q12 hrs.        Elevated troponin   Clinical presentation not consistent with acute coronary syndrome. Lexiscan 5/2018 negative for ischemia.ECHO as  Above.  "  -diurese and repeat ECHO in few weeks       GILES (Obstructive Sleep Apnea)/ Obesity Hypoventilation     Chronic respiratory failure with hypercapnia   Patient was recommended to start Bilevel PAP per sleep study 1/2020, however she never had follow-up and never got started on PAP  -will have RT set up for BIPAP/ CPAP .        RLS (restless legs syndrome)  Poor control per patient. Has history of erythrocytosis and has had phlebotomy in past because of 'PVD' and \"Strokes\". However review of chart suggests she has had VENOUS stents and strokes were due to CONVERSION DISORDER. Started  mirapex 0.125mg at bedtime 11/9.   Continue mirapex       Diabetes mellitus 2  A1c 5.6 . BS <150  -continue to  Hold usual metformin 1000 BID  -  Medium insulin sliding scale       Chronic Leg Pain  - Continue Neurontin 600 TID (per med rec)       Hyperlipidemia   - Continue simvasastin 20       Moderate mixed bipolar I disorder   - Continue Abilify 10, bupropion at bedtime, Lamictal 100, trazodone 150       Mild persistent asthma  - Continue usual Dulera or Advair  After meds reconciled  - Continue albuterol MDI prn  - Hold  Usual nebs       History of deep venous thrombosis   - Continue warfarin, pharmacy to dose       Secondary polycythemia  Hgb stable       Chronic Thrombocytopenia  Stable       Hypothyroidism  TSH 3.34 6/2020  - Continue levothyroxine (SYNTHROID/LEVOTHROID) 175 MCG        Gout  - Continue allopurinol       Morbid obesity (H) (6/17/2015)  Complicates cares, diagnosis     COVID PUI  Presentation appears more consistent with congestive heart failure than COVID    - COVID PCR negative       VICKI DELAROSA MD   Pg [phone]    DVT Prhylaxis: Warfarin  Code Status: Full Code    ROS:  As described in A/P and Exam.  Otherwise ALL are  negative.    PHYSICAL EXAM:  All vitals have been reviewed    Blood pressure 120/56, pulse 77, temperature 97.9  F (36.6  C), temperature source Oral, resp. rate 18, height 1.473 m (4' " "10\"), weight 111.3 kg (245 lb 6 oz), last menstrual period 09/27/2009, SpO2 91 %, not currently breastfeeding.    No intake/output data recorded.    GENERAL APPEARANCE: obese , alert and NAD  EYES: conjunctiva clear, eyes grossly normal  HENT: external ears and nose normal   NECK: supple, no masses or adenopathy  RESP: lungs -CtA B - no rales, rhonchi or wheezes  CV: regular rate and rhythm, normal S1 S2, no S3 or S4 and no murmur, click or rub   ABDOMEN: soft, nontender, no HSM or masses and bowel sounds normal  MS: no clubbing, cyanosis; 1+ edema  SKIN: clear without significant rashes or lesions  NEURO: -non-focal moves all 4 extr    ROUTINE  LABS (Last four results)  CMP  Recent Labs   Lab 11/10/20  0654 11/09/20  0523 11/08/20  0137 11/05/20  2034    143 140 143   POTASSIUM 3.7 3.5 3.6 3.5   CHLORIDE 103 105 104 104   CO2 35* 35* 30 35*   ANIONGAP 3 3 6 4   * 97 156* 89   BUN 16 16 17 12   CR 0.86 0.71 0.86 0.68   GFRESTIMATED 77 >90 77 >90   GFRESTBLACK 89 >90 89 >90   HOOD 9.2 9.0 8.4* 9.1   PROTTOTAL  --  5.9* 5.9* 6.2*   ALBUMIN  --  3.2* 3.1* 3.4   BILITOTAL  --  0.8 0.6 0.6   ALKPHOS  --  97 104 117   AST  --  14 17 13   ALT  --  20 19 17     CBC  Recent Labs   Lab 11/09/20 0523 11/08/20 0137 11/05/20 2034   WBC 6.0 6.6 6.0   RBC 4.69 4.92 5.13   HGB 10.7* 11.3* 11.7   HCT 39.3 40.5 42.2   MCV 84 82 82   MCH 22.8* 23.0* 22.8*   MCHC 27.2* 27.9* 27.7*   RDW 20.6* 20.1* 20.4*   * 120* 102*     INR  Recent Labs   Lab 11/10/20  0654 11/09/20  0523 11/08/20  0137 11/05/20  2034   INR 1.28* 1.20* 1.32* 2.48*     Arterial Blood Gas  Recent Labs   Lab 11/09/20 0523 11/08/20 0137   O2PER Ra 100%       No results found for this or any previous visit (from the past 24 hour(s)).    "

## 2020-11-11 ENCOUNTER — DOCUMENTATION ONLY (OUTPATIENT)
Dept: FAMILY MEDICINE | Facility: CLINIC | Age: 55
End: 2020-11-11

## 2020-11-11 VITALS
HEIGHT: 58 IN | OXYGEN SATURATION: 92 % | DIASTOLIC BLOOD PRESSURE: 57 MMHG | WEIGHT: 247.8 LBS | SYSTOLIC BLOOD PRESSURE: 129 MMHG | TEMPERATURE: 97.6 F | HEART RATE: 67 BPM | RESPIRATION RATE: 20 BRPM | BODY MASS INDEX: 52.02 KG/M2

## 2020-11-11 DIAGNOSIS — Z86.718 HISTORY OF DEEP VENOUS THROMBOSIS: ICD-10-CM

## 2020-11-11 LAB
ANION GAP SERPL CALCULATED.3IONS-SCNC: 1 MMOL/L (ref 3–14)
BUN SERPL-MCNC: 17 MG/DL (ref 7–30)
CALCIUM SERPL-MCNC: 9.7 MG/DL (ref 8.5–10.1)
CHLORIDE SERPL-SCNC: 102 MMOL/L (ref 94–109)
CO2 SERPL-SCNC: 38 MMOL/L (ref 20–32)
CREAT SERPL-MCNC: 0.75 MG/DL (ref 0.52–1.04)
GFR SERPL CREATININE-BSD FRML MDRD: >90 ML/MIN/{1.73_M2}
GLUCOSE SERPL-MCNC: 118 MG/DL (ref 70–99)
INR PPP: 1.36 (ref 0.86–1.14)
POTASSIUM SERPL-SCNC: 3.9 MMOL/L (ref 3.4–5.3)
SODIUM SERPL-SCNC: 141 MMOL/L (ref 133–144)

## 2020-11-11 PROCEDURE — 250N000013 HC RX MED GY IP 250 OP 250 PS 637: Performed by: INTERNAL MEDICINE

## 2020-11-11 PROCEDURE — 80048 BASIC METABOLIC PNL TOTAL CA: CPT | Performed by: INTERNAL MEDICINE

## 2020-11-11 PROCEDURE — 36415 COLL VENOUS BLD VENIPUNCTURE: CPT | Performed by: INTERNAL MEDICINE

## 2020-11-11 PROCEDURE — 250N000011 HC RX IP 250 OP 636: Performed by: INTERNAL MEDICINE

## 2020-11-11 PROCEDURE — 85610 PROTHROMBIN TIME: CPT | Performed by: INTERNAL MEDICINE

## 2020-11-11 PROCEDURE — 99239 HOSP IP/OBS DSCHRG MGMT >30: CPT | Performed by: INTERNAL MEDICINE

## 2020-11-11 RX ADMIN — HYDROCODONE BITARTRATE AND ACETAMINOPHEN 2 TABLET: 5; 325 TABLET ORAL at 01:19

## 2020-11-11 RX ADMIN — OMEPRAZOLE 20 MG: 20 CAPSULE, DELAYED RELEASE ORAL at 08:29

## 2020-11-11 RX ADMIN — ARIPIPRAZOLE 10 MG: 10 TABLET ORAL at 08:31

## 2020-11-11 RX ADMIN — FUROSEMIDE 40 MG: 10 INJECTION, SOLUTION INTRAMUSCULAR; INTRAVENOUS at 08:30

## 2020-11-11 RX ADMIN — ALLOPURINOL 300 MG: 300 TABLET ORAL at 08:29

## 2020-11-11 RX ADMIN — GABAPENTIN 600 MG: 600 TABLET, FILM COATED ORAL at 08:29

## 2020-11-11 RX ADMIN — PRAMIPEXOLE DIHYDROCHLORIDE 0.12 MG: 0.12 TABLET ORAL at 08:29

## 2020-11-11 ASSESSMENT — MIFFLIN-ST. JEOR: SCORE: 1618.75

## 2020-11-11 ASSESSMENT — ACTIVITIES OF DAILY LIVING (ADL)
ADLS_ACUITY_SCORE: 13

## 2020-11-11 NOTE — PLAN OF CARE
[facility] DISCHARGE NOTE    Patient discharged to home at 12:37 PM via wheel chair. Accompanied by spouse and staff. Discharge instructions reviewed with patient, opportunity offered to ask questions. Prescriptions - None ordered for discharge. All belongings sent with patient.    Cathie Hoover RN

## 2020-11-11 NOTE — PROGRESS NOTES
Care Management Discharge Note    Discharge Date: 11/11/20     Discharge Disposition:  Home     Discharge Services:  none    Discharge DME:  none    Discharge Transportation: family or friend will provide    Private pay costs discussed: Not applicable    PAS Confirmation Code:   Not applicable  Patient/family educated on Medicare website which has current facility and service quality ratings: no    Patient/Family in Agreement with the Plan: yes    Handoff Referral Completed: Yes    Additional Information:  SW has continued to follow for anticipated discharge needs. Per IDT rounds and chart review no further SW involvement needed. Pt reports she will be discharging home.     Plan: Home   No discharge needs identified    SW to complete Internal Clinic Care Coordination due to High Readmission Risk      JESUS Sainz

## 2020-11-11 NOTE — DISCHARGE SUMMARY
[place] Hospitalist Discharge Summary    Bia Bill MRN# [id number]   Age: 54 year old YOB: 1965     Date of Admission:  11/8/2020  Date of Discharge::  11/11/2020  Admitting Physician:  Aaron Luz MD  Discharge Physician:  Kermit Godoy MD  Primary Physician: [facility]  Transferring Facility: N/A     Home clinic: [facility]          Admission Diagnoses:   Obesity hypoventilation syndrome (H) [E66.2]  Acute congestive heart failure, unspecified heart failure type (H) [I50.9]          Discharge Diagnosis:     Principle diagnosis: Acute congestive heart failure, suspect right sided  Secondary diagnoses:  Patient Active Problem List   Diagnosis     Mild persistent asthma     Chronic leg pain     History of deep venous thrombosis     Alcohol abuse, in remission     Secondary polycythemia     Chondromalacia of patella     Developmental reading disorder     Esophageal reflux     Hypothyroidism     Fatty liver     GILES (Obstructive Sleep Apnea)/ Obesity Hypoventilation     RLS (restless legs syndrome)     Tobacco use disorder     Moderate mixed bipolar I disorder (H)     Personality disorder, depressive     Rosacea     [facility]     DDD (degenerative disc disease), cervical     Benign neoplasm of colon (POLYPOSIS)     Somatization disorder     Vitamin D deficiency     Morbid obesity (H)     Cyst of left ovary     Conductive hearing loss of left ear with restricted hearing of right ear     Sensorineural hearing loss (SNHL) of right ear with restricted hearing of left ear     Chronic gout without tophus     Lymphedema of both lower extremities     Type 2 diabetes mellitus (H)     Thrombocytopenia (H)     Acute congestive heart failure, unspecified heart failure type (H)     Chronic respiratory failure with hypercapnia (H)     Hyperlipidemia     Elevated troponin          Brief History of Presenting Illness:   As per admit hx  Bia Bill is a 54 year old  female who presents with 'unbearable' pains in her legs and her abdomen     The leg pain is chronic. Her abdomen is bloated and painful. This has been going on 2 weeks. She has also had increased swelling in her legs and shortness of breath at rest with activity and is really bad laying down     She was seen in clinic 3 days agio and started on an antibiotic for possible abdominal wall cellulitis which has not helped.      She has mild nonproductive cough.     She denies Fever or chills, but has had some sweats     She has frequent loose stools chronically. She says 3 days ago she had a tar colored stool. No  bright red blood per rectum    ECHO 11/9-There is mild to moderate concentric left ventricular hypertrophy.  Left ventricular systolic function is normal.  Flattened septum is consistent with RV pressure overload.  The right ventricle is mildly dilated.  The right ventricular systolic function is mild to moderately reduced.  The right atrium is mildly dilated.  Right ventricular systolic pressure is elevated, consistent with moderate  pulmonary hypertension.  IVC diameter >2.1 cm collapsing <50% with sniff suggests a high RA pressure  estimated at 15 mmHg or greater.  Study c/w acute vs chronic cor pulmonale            Hospital Course:   Acute congestive heart failure, suspect right sided  Likely cor pulmonale due to obesity and untreated hypoventilation syndrome. BNP 1861. Covid negative. CXR shows some interstitial prominence and possibly a right infrahilar area of consolidation. On lasix 40mg bid at home. ECHO as above. Was diuresed in hospital with iv lasix. Good UO, wt down 4 lbs, breathing a little better but still sob with activity  - Continue po lasix at home.        Elevated troponin   Clinical presentation not consistent with acute coronary syndrome. Lexiscan 5/2018 negative for ischemia.ECHO as  Above.   Continue po lasix. Lexiscan OP scheduled.        IGLES (Obstructive Sleep Apnea)/ Obesity  Hypoventilation     Chronic respiratory failure with hypercapnia   Patient was recommended to start Bilevel PAP per sleep study 1/2020, however she never had follow-up and never got started on PAP  -will be given info for  BIPAP/ CPAP .        RLS (restless legs syndrome)  Continue mirapex       Diabetes mellitus 2  A1c 5.6 . BS <150. Stopped  metformin 1000 BID  F/u OP.       Chronic Leg Pain  - Continue Neurontin 600 TID (per med rec)       Hyperlipidemia   - Continue simvasastin 20       Moderate mixed bipolar I disorder   - Continue Abilify 10, bupropion at bedtime, Lamictal 100, trazodone 150       Mild persistent asthma  Continue home meds         History of deep venous thrombosis   - Continue warfarin       Secondary polycythemia  Hgb stable       Chronic Thrombocytopenia  Stable       Hypothyroidism  TSH 3.34 6/2020  - Continue levothyroxine (SYNTHROID/LEVOTHROID) 175 MCG        Gout  - Continue allopurinol       Morbid obesity (H) (6/17/2015)  Complicates cares, diagnosis     COVID PUI  Presentation appears more consistent with congestive heart failure than COVID    - COVID PCR negative              Procedures:   No procedures performed during this admission         Allergies:      Allergies   Allergen Reactions     Darvocet [Propoxyphene N-Apap] Anaphylaxis     Darvocet,Percocet      Percocet [Oxycodone-Acetaminophen] Anaphylaxis, Hives and Swelling     Has tolerated hydromorphone in the past.      Asa [Aspirin] Hives     Aspirin causes seizures and hives, throat swelling.   Has tolerated ketorolac in the past.        Bee      Ibuprofen Swelling     Throat swelling per patient      Lyrica [Pregabalin] Dizziness and Swelling     Povidone Iodine Rash     Reaction to topical betadine     Tape [Adhesive Tape] Rash             Medications Prior to Admission:     Medications Prior to Admission   Medication Sig Dispense Refill Last Dose     acetaminophen (TYLENOL) 500 MG tablet Take 500-1,000 mg by mouth  every 6 hours as needed for mild pain   11/7/2020 at pm     ADVAIR -21 MCG/ACT inhaler INHALE TWO PUFFS BY MOUTH TWICE A DAY (Patient taking differently: Inhale 2 puffs into the lungs 2 times daily *Do not use more frequently than twice daily.*  Rinse mouth after use.) 12 g 3 11/7/2020 at am     albuterol (PROAIR HFA/PROVENTIL HFA/VENTOLIN HFA) 108 (90 Base) MCG/ACT inhaler INHALE TWO PUFFS BY MOUTH EVERY 6 HOURS AS NEEDED FOR SHORTNESS OF BREATH DIFFICULTY BREATHING OR WHEEZING (Patient taking differently: Inhale 2 puffs into the lungs every 6 hours as needed for shortness of breath / dyspnea or wheezing ) 18 g 0 11/7/2020 at am     albuterol (PROVENTIL) (2.5 MG/3ML) 0.083% neb solution Take 1 vial (2.5 mg) by nebulization every 6 hours as needed for shortness of breath / dyspnea or wheezing 1 Box 0 11/5/2020 at am     allopurinol (ZYLOPRIM) 300 MG tablet TAKE ONE TABLET BY MOUTH ONCE DAILY (Patient taking differently: Take 300 mg by mouth daily ) 90 tablet 1 11/5/2020 at am     ARIPiprazole (ABILIFY) 10 MG tablet TAKE ONE TABLET BY MOUTH ONCE DAILY (Patient taking differently: Take 10 mg by mouth daily ) 90 tablet 1 11/5/2020 at am     blood glucose (NO BRAND SPECIFIED) test strip Use to test blood sugar 2-3x/day. To accompany: Blood Glucose Monitor Brands: per insurance. 100 strip 11 11/7/2020 at 2000      blood glucose calibration (NO BRAND SPECIFIED) solution To accompany: Blood Glucose Monitor Brands: per insurance. 1 Bottle 3      blood glucose monitoring (NO BRAND SPECIFIED) meter device kit Use to test blood sugar daily or as directed. Preferred blood glucose meter OR supplies to accompany: Blood Glucose Monitor Brands: per insurance. 1 kit 0      buPROPion (WELLBUTRIN XL) 150 MG 24 hr tablet TAKE 1 TABLET BY MOUTH EVERY EVENING (Patient taking differently: Take 150 mg by mouth every evening TAKE 1 TABLET BY MOUTH EVERY EVENING) 150 tablet 0 11/7/2020 at pm     cephALEXin (KEFLEX) 500 MG capsule Take  1 capsule (500 mg) by mouth 4 times daily for 7 days 28 capsule 0 11/7/2020 at pm     gabapentin (NEURONTIN) 300 MG capsule Take 1 capsule (300 mg) by mouth At Bedtime TAKE ONE CAPSULE BY MOUTH ONCE DAILY AT BEDTIME WITH A 600 MG TABLET FOR A TOTAL DOSE  MG 90 capsule 3 11/7/2020 at hs     gabapentin (NEURONTIN) 600 MG tablet TAKE ONE TABLET BY MOUTH THREE TIMES A DAY (Patient taking differently: Take 600 mg by mouth 3 times daily TAKE ONE TABLET BY MOUTH THREE TIMES A DAY) 90 tablet 3 11/7/2020 at pm     ipratropium - albuterol 0.5 mg/2.5 mg/3 mL (DUONEB) 0.5-2.5 (3) MG/3ML neb solution Take 1 vial (3 mLs) by nebulization every 6 hours as needed for shortness of breath / dyspnea or wheezing 30 vial 11 11/7/2020 at pm     lamoTRIgine (LAMICTAL) 100 MG tablet TAKE ONE TABLET BY MOUTH ONCE DAILY (Patient taking differently: Take 100 mg by mouth daily ) 90 tablet 1 11/7/2020 at pm     levothyroxine (SYNTHROID/LEVOTHROID) 175 MCG tablet TAKE ONE TABLET BY MOUTH ONCE DAILY (Patient taking differently: Take 175 mcg by mouth daily Separate oral administration of iron- or calcium-containing products and levothyroxine by at least 4 hours.) 90 tablet 0 11/7/2020 at pm     mometasone-formoterol (DULERA) 200-5 MCG/ACT oral inhaler Inhale 2 puffs into the lungs 2 times daily 13 g 1 11/7/2020 at pm     omeprazole (PRILOSEC) 20 MG DR capsule Take 1 capsule (20 mg) by mouth 2 times daily 60 capsule 11 11/7/2020 at pm     potassium chloride ER (KLOR-CON M) 10 MEQ CR tablet TAKE ONE TABLET BY MOUTH ONCE DAILY (Patient taking differently: Take 10 mEq by mouth daily ) 90 tablet 2 11/7/2020 at pm     simvastatin (ZOCOR) 20 MG tablet TAKE ONE TABLET BY MOUTH EVERY NIGHT AT BEDTIME (Patient taking differently: Take 20 mg by mouth At Bedtime ) 90 tablet 2 11/7/2020 at pm     thin (NO BRAND SPECIFIED) lancets Use with lanceting device. To accompany: Blood Glucose Monitor Brands: per insurance. 1 each 6 11/7/2020 at pm     traZODone  (DESYREL) 150 MG tablet TAKE ONE TABLET BY MOUTH EVERY NIGHT AT BEDTIME (Patient taking differently: Take 150 mg by mouth At Bedtime ) 90 tablet 1 11/7/2020 at pm     trimethoprim-polymyxin b (POLYTRIM) 14111-7.1 UNIT/ML-% ophthalmic solution Place 1-2 drops Into the left eye every 6 hours 10 mL 0 11/7/2020 at pm     VITAMIN D3 25 MCG (1000 UT) tablet TAKE ONE TABLET BY MOUTH ONCE DAILY (Patient taking differently: Take 1 tablet by mouth daily ) 100 tablet 2 11/7/2020 at pm     warfarin ANTICOAGULANT (JANTOVEN ANTICOAGULANT) 5 MG tablet TAKE TWO AND ONE-HALF TABLETS BY MOUTH EVERY MONDAY, TWO TABLETS ON ALL OTHER DAYS OR AS DIRECTED BY ANTICOAGULATION CLINIC 157 tablet 1 11/7/2020 at pm     alcohol swab prep pads Use to swab area of injection/mike as directed. 100 each 3      EPINEPHrine (ANY BX GENERIC EQUIV) 0.3 MG/0.3ML injection 2-pack Inject 0.3 mLs (0.3 mg) into the muscle once as needed for anaphylaxis 0.6 mL 0 never used     furosemide (LASIX) 40 MG tablet Take 1 tablet (40 mg) by mouth 2 times daily 180 tablet 4 More than a month at Unknown time     order for DME Equipment being ordered:x2 [employer] 30/40mmHg compression wraps with x2 extra prs of compression liners 1 each 0      order for DME Equipment being ordered: Nebulizer 1 Device 0      order for DME Equipment being ordered: CPAP  AIRSENSE 10  5-18 CM H20  SN# [id number]   DN# [id number]        order for DME Equipment being ordered: DEPENDS SIZE LARGE 60 Month 5      order for DME Equipment being ordered: INCONTINENCE PADS   QID 1 Month 11      pramipexole (MIRAPEX) 0.125 MG tablet Take 1-20 tablets (0.125-2.5 mg) by mouth At Bedtime TAKE 1-2 TABLETS BY MOUTH AT BEDTIME 180 tablet 1      [DISCONTINUED] metFORMIN (GLUCOPHAGE) 1000 MG tablet TAKE ONE TABLET BY MOUTH TWICE A DAY WITH MEALS (Patient taking differently: Take 1,000 mg by mouth 2 times daily (with meals) ) 180 tablet 0 11/7/2020 at pm             Discharge Medications:     Current Discharge  Medication List      CONTINUE these medications which have NOT CHANGED    Details   acetaminophen (TYLENOL) 500 MG tablet Take 500-1,000 mg by mouth every 6 hours as needed for mild pain      ADVAIR -21 MCG/ACT inhaler INHALE TWO PUFFS BY MOUTH TWICE A DAY  Qty: 12 g, Refills: 3    Associated Diagnoses: Chronic obstructive pulmonary disease, unspecified COPD type (H)      albuterol (PROAIR HFA/PROVENTIL HFA/VENTOLIN HFA) 108 (90 Base) MCG/ACT inhaler INHALE TWO PUFFS BY MOUTH EVERY 6 HOURS AS NEEDED FOR SHORTNESS OF BREATH DIFFICULTY BREATHING OR WHEEZING  Qty: 18 g, Refills: 0    Comments: OK to fill today Needs follow up virtual visit before further refills  Associated Diagnoses: Mild persistent asthma without complication; Chronic obstructive pulmonary disease, unspecified COPD type (H)      albuterol (PROVENTIL) (2.5 MG/3ML) 0.083% neb solution Take 1 vial (2.5 mg) by nebulization every 6 hours as needed for shortness of breath / dyspnea or wheezing  Qty: 1 Box, Refills: 0    Comments: The patient requests that this prescription be held on file for filling in the near future.  Associated Diagnoses: Mild persistent asthma without complication; Chronic obstructive pulmonary disease, unspecified COPD type (H)      allopurinol (ZYLOPRIM) 300 MG tablet TAKE ONE TABLET BY MOUTH ONCE DAILY  Qty: 90 tablet, Refills: 1    Associated Diagnoses: Acute gouty arthritis      ARIPiprazole (ABILIFY) 10 MG tablet TAKE ONE TABLET BY MOUTH ONCE DAILY  Qty: 90 tablet, Refills: 1    Associated Diagnoses: Major depressive disorder, recurrent episode, moderate (H)      blood glucose (NO BRAND SPECIFIED) test strip Use to test blood sugar 2-3x/day. To accompany: Blood Glucose Monitor Brands: per insurance.  Qty: 100 strip, Refills: 11    Associated Diagnoses: Type 2 diabetes mellitus without complication, without long-term current use of insulin (H)      blood glucose calibration (NO BRAND SPECIFIED) solution To accompany: Blood  Glucose Monitor Brands: per insurance.  Qty: 1 Bottle, Refills: 3    Associated Diagnoses: Type 2 diabetes mellitus without complication, without long-term current use of insulin (H)      blood glucose monitoring (NO BRAND SPECIFIED) meter device kit Use to test blood sugar daily or as directed. Preferred blood glucose meter OR supplies to accompany: Blood Glucose Monitor Brands: per insurance.  Qty: 1 kit, Refills: 0    Associated Diagnoses: Type 2 diabetes mellitus without complication, without long-term current use of insulin (H)      buPROPion (WELLBUTRIN XL) 150 MG 24 hr tablet TAKE 1 TABLET BY MOUTH EVERY EVENING  Qty: 150 tablet, Refills: 0    Associated Diagnoses: Moderate mixed bipolar I disorder (H)      cephALEXin (KEFLEX) 500 MG capsule Take 1 capsule (500 mg) by mouth 4 times daily for 7 days  Qty: 28 capsule, Refills: 0      gabapentin (NEURONTIN) 300 MG capsule Take 1 capsule (300 mg) by mouth At Bedtime TAKE ONE CAPSULE BY MOUTH ONCE DAILY AT BEDTIME WITH A 600 MG TABLET FOR A TOTAL DOSE  MG  Qty: 90 capsule, Refills: 3    Associated Diagnoses: Polyneuropathy in other diseases classified elsewhere (H)      gabapentin (NEURONTIN) 600 MG tablet TAKE ONE TABLET BY MOUTH THREE TIMES A DAY  Qty: 90 tablet, Refills: 3    Associated Diagnoses: Polyneuropathy in other diseases classified elsewhere (H)      ipratropium - albuterol 0.5 mg/2.5 mg/3 mL (DUONEB) 0.5-2.5 (3) MG/3ML neb solution Take 1 vial (3 mLs) by nebulization every 6 hours as needed for shortness of breath / dyspnea or wheezing  Qty: 30 vial, Refills: 11    Associated Diagnoses: Chronic obstructive pulmonary disease, unspecified COPD type (H)      lamoTRIgine (LAMICTAL) 100 MG tablet TAKE ONE TABLET BY MOUTH ONCE DAILY  Qty: 90 tablet, Refills: 1    Associated Diagnoses: Major depressive disorder, recurrent episode, moderate (H)      levothyroxine (SYNTHROID/LEVOTHROID) 175 MCG tablet TAKE ONE TABLET BY MOUTH ONCE DAILY  Qty: 90 tablet,  Refills: 0    Associated Diagnoses: Hypothyroidism, unspecified type      mometasone-formoterol (DULERA) 200-5 MCG/ACT oral inhaler Inhale 2 puffs into the lungs 2 times daily  Qty: 13 g, Refills: 1    Associated Diagnoses: COPD exacerbation (H); Chronic obstructive pulmonary disease, unspecified COPD type (H)      omeprazole (PRILOSEC) 20 MG DR capsule Take 1 capsule (20 mg) by mouth 2 times daily  Qty: 60 capsule, Refills: 11    Associated Diagnoses: Gastroesophageal reflux disease without esophagitis      potassium chloride ER (KLOR-CON M) 10 MEQ CR tablet TAKE ONE TABLET BY MOUTH ONCE DAILY  Qty: 90 tablet, Refills: 2    Associated Diagnoses: Lymphedema      simvastatin (ZOCOR) 20 MG tablet TAKE ONE TABLET BY MOUTH EVERY NIGHT AT BEDTIME  Qty: 90 tablet, Refills: 2    Associated Diagnoses: Hypercholesteremia      thin (NO BRAND SPECIFIED) lancets Use with lanceting device. To accompany: Blood Glucose Monitor Brands: per insurance.  Qty: 1 each, Refills: 6    Associated Diagnoses: Type 2 diabetes mellitus without complication, without long-term current use of insulin (H)      traZODone (DESYREL) 150 MG tablet TAKE ONE TABLET BY MOUTH EVERY NIGHT AT BEDTIME  Qty: 90 tablet, Refills: 1    Associated Diagnoses: Major depressive disorder, recurrent episode, moderate (H)      trimethoprim-polymyxin b (POLYTRIM) 76888-2.1 UNIT/ML-% ophthalmic solution Place 1-2 drops Into the left eye every 6 hours  Qty: 10 mL, Refills: 0    Associated Diagnoses: Bacterial conjunctivitis of left eye      VITAMIN D3 25 MCG (1000 UT) tablet TAKE ONE TABLET BY MOUTH ONCE DAILY  Qty: 100 tablet, Refills: 2    Associated Diagnoses: Lymphedema of both lower extremities      warfarin ANTICOAGULANT (JANTOVEN ANTICOAGULANT) 5 MG tablet TAKE TWO AND ONE-HALF TABLETS BY MOUTH EVERY MONDAY, TWO TABLETS ON ALL OTHER DAYS OR AS DIRECTED BY ANTICOAGULATION CLINIC  Qty: 157 tablet, Refills: 1    Associated Diagnoses: History of recurrent deep vein  thrombosis (DVT)      alcohol swab prep pads Use to swab area of injection/mike as directed.  Qty: 100 each, Refills: 3    Associated Diagnoses: Type 2 diabetes mellitus without complication, without long-term current use of insulin (H)      EPINEPHrine (ANY BX GENERIC EQUIV) 0.3 MG/0.3ML injection 2-pack Inject 0.3 mLs (0.3 mg) into the muscle once as needed for anaphylaxis  Qty: 0.6 mL, Refills: 0    Associated Diagnoses: Anaphylactic reaction to bee sting, undetermined intent, subsequent encounter      furosemide (LASIX) 40 MG tablet Take 1 tablet (40 mg) by mouth 2 times daily  Qty: 180 tablet, Refills: 4    Associated Diagnoses: Lymphedema of both lower extremities      !! order for DME Equipment being ordered:x2 [employer] 30/40mmHg compression wraps with x2 extra prs of compression liners  Qty: 1 each, Refills: 0    Associated Diagnoses: Secondary lymphedema      !! order for DME Equipment being ordered: Nebulizer  Qty: 1 Device, Refills: 0    Associated Diagnoses: COPD exacerbation (H); Chronic obstructive pulmonary disease, unspecified COPD type (H)      !! order for DME Equipment being ordered: CPAP  AIRSENSE 10  5-18 CM H20  SN# [id number]   DN# [id number]      !! order for DME Equipment being ordered: DEPENDS SIZE LARGE  Qty: 60 Month, Refills: 5    Associated Diagnoses: Mixed incontinence      !! order for DME Equipment being ordered: INCONTINENCE PADS   QID  Qty: 1 Month, Refills: 11    Associated Diagnoses: Other urinary incontinence      pramipexole (MIRAPEX) 0.125 MG tablet Take 1-20 tablets (0.125-2.5 mg) by mouth At Bedtime TAKE 1-2 TABLETS BY MOUTH AT BEDTIME  Qty: 180 tablet, Refills: 1    Associated Diagnoses: Restless leg syndrome       !! - Potential duplicate medications found. Please discuss with provider.      STOP taking these medications       metFORMIN (GLUCOPHAGE) 1000 MG tablet Comments:   Reason for Stopping:                     Consultations:   No consultations were requested during this  "admission            Discharge Exam:   Blood pressure 129/57, pulse 67, temperature 97.6  F (36.4  C), temperature source Oral, resp. rate 20, height 1.473 m (4' 10\"), weight 112.4 kg (247 lb 12.8 oz), last menstrual period 09/27/2009, SpO2 92 %, not currently breastfeeding.  GENERAL APPEARANCE: healthy, alert and no distress  EYES: conjunctiva clear, eyes grossly normal  HENT: external ears and nose normal   NECK: supple, no masses or adenopathy  RESP: lungs clear to auscultation - no rales, rhonchi or wheezes  CV: regular rate and rhythm, normal S1 S2, no S3 or S4 and no murmur, click or rub   ABDOMEN: soft, nontender, no HSM or masses and bowel sounds normal  MS: no clubbing, cyanosis; 1-2+ edema  SKIN: clear without significant rashes or lesions  NEURO: Normal strength and tone, sensory exam grossly normal, mentation intact and speech normal    Unresulted Labs Ordered in the Past 30 Days of this Admission     Date and Time Order Name Status Description    11/5/2020 2024 Blood culture Preliminary     11/5/2020 2024 Blood culture Preliminary           No results found for this or any previous visit (from the past 24 hour(s)).         Pending Tests at Discharge:   None         Discharge Instructions and Follow-Up:     Discharge diet: Regular   Discharge activity: Activity as tolerated   Discharge follow-up: Follow up with primary care provider in 1-2 weeks  F/u for lexiscan as scheduled           Discharge Disposition:     Discharged to home      Attestation:  I have reviewed today's vital signs, notes, medications, labs and imaging.    Time Spent on this Encounter   I, Kermit Godoy MD, personally saw the patient today and spent greater than 30 minutes discharging this patient.    Kermit Godoy MD       "

## 2020-11-11 NOTE — PROGRESS NOTES
ANTICOAGULATION  MANAGEMENT: Discharge Review    Bia Bill chart reviewed for anticoagulation continuity of care    Hospital Admission on 11/8-11/11 for CHF exacerbation.    Discharge disposition: Home    Results:    Recent labs: (last 7 days)     11/05/20 2034 11/08/20  0137 11/09/20  0523 11/10/20  0654 11/11/20  0609   INR 2.48* 1.32* 1.20* 1.28* 1.36*     Anticoagulation inpatient management:     see [facility] calendar    Anticoagulation discharge instructions:     Warfarin dosing: home regimen continued   Bridging: No   INR goal change: No      Medication changes affecting anticoagulation: Yes: cephalexin 11/6-11/11 for cephalexin    Additional factors affecting anticoagulation: Yes: swelling in abdomen and legs    Plan     No adjustment to anticoagulation plan needed    Recommended follow up is scheduled for 11/13 in lab    Anticoagulation Calendar updated    Tara Rivas RN

## 2020-11-11 NOTE — PLAN OF CARE
On room air, saturations 91-94%.  Walking halls independently, I/O monitored, but only 1400 ml output x 12 hours.  Next dose of lasix at 2100.  Reported no abdominal pain and had 2 stools which helped her constipation, but reported left leg pain which was relieved by a norco this morning.  Eating good amount of meals, alet/oriented, and cooperative with plan of care.

## 2020-11-11 NOTE — DISCHARGE INSTRUCTIONS
Warfarin  Take 12.5mg (2.5 tablets) on 11/11 and 11/12, then have your INR checked on 11/13. Please notify the anticoagulation clinic of your INR check.    BIPAP for stable sleep apnea with home equipment settings Start: 11/11/20 2200, AT BEDTIME, Routine     Comments: No lozenges or gum should be given while patient on BiPAP. Get home equipment with home settings as best option as soon as possible, otherwise use home settings with our hospital equipment if home settings are known. If home settings are not known use BiPAP pressures in question #1 and #2 until home settings are known.

## 2020-11-11 NOTE — PLAN OF CARE
Pt diuresing well with IV lasix, UO 1600 this shift. Pedal edema improved. Lungs diminished bilat, no crackles noted. Pt c/o restless legs during night, received Norco @ 0119 w/ good relief.

## 2020-11-11 NOTE — PHARMACY-ANTICOAGULATION SERVICE
Clinical Pharmacy- Warfarin Discharge Note  This patient is currently on warfarin for the treatment of DVT ppx.  INR Goal= 2-3  Expected length of therapy undetermined.    Warfarin PTA Regimen: 12.5mg on Monday, 10mg rest of week.      Anticoagulation Dose History     Recent Dosing and Labs Latest Ref Rng & Units 9/18/2020 10/9/2020 11/5/2020 11/8/2020 11/9/2020 11/10/2020 11/11/2020    BEBOZ IMS TEMPLATE - - - - 12.5 mg 12.5 mg - -    Warfarin 7.5 mg - - - - - - 15 mg -    INR 0.86 - 1.14 1.99(H) 2.48(H) 2.48(H) 1.32(H) 1.20(H) 1.28(H) 1.36(H)    INR Point of Care 0.86 - 1.14 - - - - - - -          Vitamin K doses administered during the last 7 days: none  FFP administered during the last 7 days: none  Recommend discharging the patient on a warfarin regimen of 12.5mg x2 days, then recheck INR on 11/13. Increased dose since INR has been very slow to increase despite higher doses vs PTA regimen. Pt reports missing a dose recently prior to being admitted.

## 2020-11-12 ENCOUNTER — PATIENT OUTREACH (OUTPATIENT)
Dept: NURSING | Facility: CLINIC | Age: 55
End: 2020-11-12
Payer: COMMERCIAL

## 2020-11-12 LAB
BACTERIA SPEC CULT: NO GROWTH
BACTERIA SPEC CULT: NO GROWTH
SPECIMEN SOURCE: NORMAL
SPECIMEN SOURCE: NORMAL

## 2020-11-12 ASSESSMENT — ACTIVITIES OF DAILY LIVING (ADL): DEPENDENT_IADLS:: INDEPENDENT

## 2020-11-12 NOTE — PROGRESS NOTES
"Clinic Care Coordination Contact    Follow Up Progress Note      Assessment: patient calling clinic care coordinator.    Patient states she is feeling better than before she went into the hospital.    Patient states the redness and pain on her abdomen is gone. The edema in her feet and legs \"is way down\"     Patient does not sound short of breath during our conversation. She is able to talk in full sentences. She states she does get short of breath with exertion.     Advised patient to start weighing herself daily. She states she has a scale. This can help her monitor fluid retention.     We reviewed medications, one by one. She is to hold the metformin until she sees PCP. This appointment is scheduled for 11/23/20.    We reviewed discharge paper work and all of her scheduled appointments. Explained the lexiscan and stress test to patient. .    Patient states that the respiratory therapist came in the room before she left and talked about the need for her to get the Bipap. She does not know what he said.   Patient states she saw the sleep clinic in [place] and they ordered the Bipap she never got. Gave her the phone number and instructed her to call and find out what she needs to do. Patient agrees to the plan.    Patient states she has not smoked since coming home. She states she now understands that her health is more important than having a cigarette. She states she wishes her friend would also stop.  Discussed she needs to think of herself first. She agrees       Goals addressed this encounter: Improved currently  Goals Addressed                 This Visit's Progress      #1 (pt-stated)   60%     Goal Statement: I want my shortness of breath to improve    Date goal set: 11/1/19    Measure of Success: When I am not short of breath    Barriers: COPD    Strengths: Motivated    Date to Achieve By: March 2021    Patient expressed understanding of goal: Yes    Action steps to achieve this goal    1. I will use " nebulizer as instructed  2. I will see PCP  as scheduled  3. I will use my asthma action plan         Intervention/Education provided during outreach: As above. Much encouragement and reassurance. Congratulations on no smoking at this time.      Outreach Frequency: monthly    Plan:   Patient will get Bipap.  Patient will follow up with scheduled appointments.     Care Coordinator will follow up in one month.    Michela Whitaker RN, [facility] RN Coordinator  [facility]   11/12/2020    11:33 AM  [phone]

## 2020-11-13 ENCOUNTER — HOSPITAL ENCOUNTER (OUTPATIENT)
Dept: LAB | Facility: CLINIC | Age: 55
Discharge: HOME | End: 2020-11-13
Attending: INTERNAL MEDICINE | Admitting: INTERNAL MEDICINE
Payer: COMMERCIAL

## 2020-11-13 ENCOUNTER — ANTICOAGULATION THERAPY VISIT (OUTPATIENT)
Dept: FAMILY MEDICINE | Facility: CLINIC | Age: 55
End: 2020-11-13

## 2020-11-13 ENCOUNTER — INFUSION THERAPY VISIT (OUTPATIENT)
Dept: INFUSION THERAPY | Facility: CLINIC | Age: 55
End: 2020-11-13
Attending: INTERNAL MEDICINE
Payer: COMMERCIAL

## 2020-11-13 DIAGNOSIS — I73.9 PVD (PERIPHERAL VASCULAR DISEASE) WITH CLAUDICATION (H): ICD-10-CM

## 2020-11-13 DIAGNOSIS — D75.1 ERYTHROCYTOSIS: ICD-10-CM

## 2020-11-13 DIAGNOSIS — I82.409 DVT (DEEP VENOUS THROMBOSIS) (H): ICD-10-CM

## 2020-11-13 DIAGNOSIS — Z86.73 HISTORY OF STROKE: ICD-10-CM

## 2020-11-13 DIAGNOSIS — Z86.718 HISTORY OF DEEP VENOUS THROMBOSIS: ICD-10-CM

## 2020-11-13 LAB
BASOPHILS # BLD AUTO: 0 10E9/L (ref 0–0.2)
BASOPHILS NFR BLD AUTO: 0.5 %
DIFFERENTIAL METHOD BLD: ABNORMAL
EOSINOPHIL # BLD AUTO: 0.1 10E9/L (ref 0–0.7)
EOSINOPHIL NFR BLD AUTO: 1.4 %
ERYTHROCYTE [DISTWIDTH] IN BLOOD BY AUTOMATED COUNT: 21.2 % (ref 10–15)
HCT VFR BLD AUTO: 40.3 % (ref 35–47)
HGB BLD-MCNC: 11.3 G/DL (ref 11.7–15.7)
IMM GRANULOCYTES # BLD: 0 10E9/L (ref 0–0.4)
IMM GRANULOCYTES NFR BLD: 0.4 %
INR PPP: 1.79 (ref 0.86–1.14)
LYMPHOCYTES # BLD AUTO: 1.1 10E9/L (ref 0.8–5.3)
LYMPHOCYTES NFR BLD AUTO: 19.5 %
MCH RBC QN AUTO: 23.4 PG (ref 26.5–33)
MCHC RBC AUTO-ENTMCNC: 28 G/DL (ref 31.5–36.5)
MCV RBC AUTO: 84 FL (ref 78–100)
MONOCYTES # BLD AUTO: 0.4 10E9/L (ref 0–1.3)
MONOCYTES NFR BLD AUTO: 6.5 %
NEUTROPHILS # BLD AUTO: 4 10E9/L (ref 1.6–8.3)
NEUTROPHILS NFR BLD AUTO: 71.7 %
NRBC # BLD AUTO: 0 10*3/UL
NRBC BLD AUTO-RTO: 0 /100
PLATELET # BLD AUTO: 120 10E9/L (ref 150–450)
RBC # BLD AUTO: 4.82 10E12/L (ref 3.8–5.2)
WBC # BLD AUTO: 5.5 10E9/L (ref 4–11)

## 2020-11-13 PROCEDURE — 85025 COMPLETE CBC W/AUTO DIFF WBC: CPT | Performed by: INTERNAL MEDICINE

## 2020-11-13 PROCEDURE — 36415 COLL VENOUS BLD VENIPUNCTURE: CPT | Performed by: INTERNAL MEDICINE

## 2020-11-13 PROCEDURE — 85610 PROTHROMBIN TIME: CPT | Performed by: INTERNAL MEDICINE

## 2020-11-13 NOTE — PROGRESS NOTES
Pt's lab results do not meet requirements for therapeutic phlebotomy today. Pt was not seen in the infusion clinic. Was given printout of lab results and taken by wheelchair (mode of transportation she arrived in) to her car -  driving.

## 2020-11-13 NOTE — PROGRESS NOTES
ANTICOAGULATION MANAGEMENT     Patient Name:  Bia Bill  Date:  2020    ASSESSMENT /SUBJECTIVE:    Today's INR result of 1.79 is subtherapeutic. Goal INR of 2.0-3.0      Warfarin dose taken: Warfarin taken as instructed    Diet: No new diet changes affecting INR    Medication changes/ interactions: No new medications/supplements affecting INR    Previous INR: Therapeutic     S/S of bleeding or thromboembolism: No    New injury or illness: No    Upcoming surgery, procedure or cardioversion: No    Additional findings: Was in the hospital - for acute on chronic CHF. She missed a dose prior to hospitalization which may explain recent sub INRs. Will take boost today and resume maintenance. Recheck in 1 week.      PLAN:    Telephone call with Bia regarding INR result and instructed:     Warfarin Dosing Instructions: 12.5 mg today then continue your current warfarin dose of 12.5 mg M and 10 mg ROW    Instructed patient to follow up no later than: 1 week  Lab visit scheduled    Education provided: None required      eth verbalizes understanding and agrees to warfarin dosing plan.    Instructed to call the Anticoagulation Clinic for any changes, questions or concerns. (#[phone])        Carmen Way RN      OBJECTIVE:  Recent labs: (last 7 days)     20  0137 20  0523 11/10/20  0654 20  0609 20  0936   INR 1.32* 1.20* 1.28* 1.36* 1.79*         No question data found.  Anticoagulation Summary  As of 2020    INR goal:  2.0-3.0   TTR:  80.8 % (1 y)   INR used for dosin.79 (2020)   Warfarin maintenance plan:  12.5 mg (5 mg x 2.5) every Mon; 10 mg (5 mg x 2) all other days   Full warfarin instructions:  : 12.5 mg; Otherwise 12.5 mg every Mon; 10 mg all other days   Weekly warfarin total:  72.5 mg   Plan last modified:  Carmen Way, RN (2020)   Next INR check:  2020   Priority:  Maintenance   Target end date:  Indefinite     Indications    Long term current use of anticoagulant therapy (Resolved) [Z79.01]  History of deep venous thrombosis [Z86.718]  History of stroke symptoms  stroke ruled out on imaging (Resolved) [Z86.73]             Anticoagulation Episode Summary     INR check location:      Preferred lab:      Send INR reminders to:  ANGI BHAKTA    Comments:  * lab draw due to elevated hematocrit (fingerstick meters don't work). Needs to have adjusted INR if hct over 55%. LEFT LE. STENT x 3 LEFT UPPER LEG. Previous arterial clot. AS of 10/9/19 Goal range 2.0-3.0.      Anticoagulation Care Providers     Provider Role Specialty Phone number    Kd Leong MD Responsible Family Practice [phone]

## 2020-11-16 DIAGNOSIS — F31.62 MODERATE MIXED BIPOLAR I DISORDER (H): Chronic | ICD-10-CM

## 2020-11-16 RX ORDER — BUPROPION HYDROCHLORIDE 150 MG/1
TABLET ORAL
Qty: 90 TABLET | Refills: 0 | Status: SHIPPED | OUTPATIENT
Start: 2020-11-16 | End: 2021-01-11

## 2020-11-16 NOTE — TELEPHONE ENCOUNTER
Medication is being filled for 1 time refill only due to:  Patient needs to be seen because needs follow upand phq9.   Mundo Conde RN

## 2020-11-17 ENCOUNTER — HOSPITAL ENCOUNTER (INPATIENT)
Facility: CLINIC | Age: 55
LOS: 2 days | Discharge: HOME | End: 2020-11-19
Attending: FAMILY MEDICINE | Admitting: INTERNAL MEDICINE
Payer: COMMERCIAL

## 2020-11-17 ENCOUNTER — APPOINTMENT (OUTPATIENT)
Dept: GENERAL RADIOLOGY | Facility: CLINIC | Age: 55
End: 2020-11-17
Attending: FAMILY MEDICINE
Payer: COMMERCIAL

## 2020-11-17 ENCOUNTER — NURSE TRIAGE (OUTPATIENT)
Dept: NURSING | Facility: CLINIC | Age: 55
End: 2020-11-17

## 2020-11-17 DIAGNOSIS — R06.02 SHORTNESS OF BREATH: ICD-10-CM

## 2020-11-17 DIAGNOSIS — R07.9 CHEST PAIN, UNSPECIFIED TYPE: ICD-10-CM

## 2020-11-17 DIAGNOSIS — I26.09 ACUTE COR PULMONALE (H): Primary | ICD-10-CM

## 2020-11-17 DIAGNOSIS — R09.02 HYPOXIA: ICD-10-CM

## 2020-11-17 DIAGNOSIS — I89.0 LYMPHEDEMA OF BOTH LOWER EXTREMITIES: ICD-10-CM

## 2020-11-17 DIAGNOSIS — I51.7 CARDIOMEGALY: ICD-10-CM

## 2020-11-17 LAB
ALBUMIN SERPL-MCNC: 3.3 G/DL (ref 3.4–5)
ALBUMIN UR-MCNC: 30 MG/DL
ALP SERPL-CCNC: 102 U/L (ref 40–150)
ALT SERPL W P-5'-P-CCNC: 26 U/L (ref 0–50)
ANION GAP SERPL CALCULATED.3IONS-SCNC: 2 MMOL/L (ref 3–14)
APPEARANCE UR: ABNORMAL
AST SERPL W P-5'-P-CCNC: 16 U/L (ref 0–45)
BACTERIA #/AREA URNS HPF: ABNORMAL /HPF
BASOPHILS # BLD AUTO: 0 10E9/L (ref 0–0.2)
BASOPHILS NFR BLD AUTO: 0.3 %
BILIRUB SERPL-MCNC: 0.9 MG/DL (ref 0.2–1.3)
BILIRUB UR QL STRIP: NEGATIVE
BUN SERPL-MCNC: 19 MG/DL (ref 7–30)
CALCIUM SERPL-MCNC: 8.7 MG/DL (ref 8.5–10.1)
CHLORIDE SERPL-SCNC: 102 MMOL/L (ref 94–109)
CO2 SERPL-SCNC: 33 MMOL/L (ref 20–32)
COLOR UR AUTO: YELLOW
CREAT SERPL-MCNC: 0.86 MG/DL (ref 0.52–1.04)
DIFFERENTIAL METHOD BLD: ABNORMAL
EOSINOPHIL # BLD AUTO: 0 10E9/L (ref 0–0.7)
EOSINOPHIL NFR BLD AUTO: 0.1 %
ERYTHROCYTE [DISTWIDTH] IN BLOOD BY AUTOMATED COUNT: 21.6 % (ref 10–15)
GFR SERPL CREATININE-BSD FRML MDRD: 77 ML/MIN/{1.73_M2}
GLUCOSE SERPL-MCNC: 132 MG/DL (ref 70–99)
GLUCOSE UR STRIP-MCNC: NEGATIVE MG/DL
HCT VFR BLD AUTO: 38.2 % (ref 35–47)
HGB BLD-MCNC: 10.8 G/DL (ref 11.7–15.7)
HGB UR QL STRIP: NEGATIVE
HYALINE CASTS #/AREA URNS LPF: 61 /LPF (ref 0–2)
IMM GRANULOCYTES # BLD: 0.1 10E9/L (ref 0–0.4)
IMM GRANULOCYTES NFR BLD: 0.6 %
INR PPP: 2.37 (ref 0.86–1.14)
KETONES UR STRIP-MCNC: NEGATIVE MG/DL
LEUKOCYTE ESTERASE UR QL STRIP: NEGATIVE
LYMPHOCYTES # BLD AUTO: 0.9 10E9/L (ref 0.8–5.3)
LYMPHOCYTES NFR BLD AUTO: 6.1 %
MCH RBC QN AUTO: 23.5 PG (ref 26.5–33)
MCHC RBC AUTO-ENTMCNC: 28.3 G/DL (ref 31.5–36.5)
MCV RBC AUTO: 83 FL (ref 78–100)
MONOCYTES # BLD AUTO: 1.1 10E9/L (ref 0–1.3)
MONOCYTES NFR BLD AUTO: 7 %
MUCOUS THREADS #/AREA URNS LPF: PRESENT /LPF
NEUTROPHILS # BLD AUTO: 12.9 10E9/L (ref 1.6–8.3)
NEUTROPHILS NFR BLD AUTO: 85.9 %
NITRATE UR QL: NEGATIVE
NRBC # BLD AUTO: 0 10*3/UL
NRBC BLD AUTO-RTO: 0 /100
NT-PROBNP SERPL-MCNC: 2544 PG/ML (ref 0–900)
PH UR STRIP: 5 PH (ref 5–7)
PLATELET # BLD AUTO: 118 10E9/L (ref 150–450)
POTASSIUM SERPL-SCNC: 4 MMOL/L (ref 3.4–5.3)
PROT SERPL-MCNC: 6.3 G/DL (ref 6.8–8.8)
RBC # BLD AUTO: 4.6 10E12/L (ref 3.8–5.2)
RBC #/AREA URNS AUTO: 2 /HPF (ref 0–2)
SODIUM SERPL-SCNC: 137 MMOL/L (ref 133–144)
SOURCE: ABNORMAL
SP GR UR STRIP: 1.02 (ref 1–1.03)
SQUAMOUS #/AREA URNS AUTO: 24 /HPF (ref 0–1)
UROBILINOGEN UR STRIP-MCNC: 0 MG/DL (ref 0–2)
WBC # BLD AUTO: 15 10E9/L (ref 4–11)
WBC #/AREA URNS AUTO: 3 /HPF (ref 0–5)

## 2020-11-17 PROCEDURE — 99285 EMERGENCY DEPT VISIT HI MDM: CPT | Mod: 25 | Performed by: FAMILY MEDICINE

## 2020-11-17 PROCEDURE — 93005 ELECTROCARDIOGRAM TRACING: CPT | Performed by: FAMILY MEDICINE

## 2020-11-17 PROCEDURE — 99223 1ST HOSP IP/OBS HIGH 75: CPT | Mod: AI | Performed by: INTERNAL MEDICINE

## 2020-11-17 PROCEDURE — 85610 PROTHROMBIN TIME: CPT | Performed by: FAMILY MEDICINE

## 2020-11-17 PROCEDURE — 96374 THER/PROPH/DIAG INJ IV PUSH: CPT | Performed by: FAMILY MEDICINE

## 2020-11-17 PROCEDURE — 93010 ELECTROCARDIOGRAM REPORT: CPT | Performed by: FAMILY MEDICINE

## 2020-11-17 PROCEDURE — U0003 INFECTIOUS AGENT DETECTION BY NUCLEIC ACID (DNA OR RNA); SEVERE ACUTE RESPIRATORY SYNDROME CORONAVIRUS 2 (SARS-COV-2) (CORONAVIRUS DISEASE [COVID-19]), AMPLIFIED PROBE TECHNIQUE, MAKING USE OF HIGH THROUGHPUT TECHNOLOGIES AS DESCRIBED BY CMS-2020-01-R: HCPCS | Performed by: FAMILY MEDICINE

## 2020-11-17 PROCEDURE — 76604 US EXAM CHEST: CPT | Mod: 26 | Performed by: FAMILY MEDICINE

## 2020-11-17 PROCEDURE — 120N000001 HC R&B MED SURG/OB

## 2020-11-17 PROCEDURE — 80053 COMPREHEN METABOLIC PANEL: CPT | Performed by: FAMILY MEDICINE

## 2020-11-17 PROCEDURE — C9803 HOPD COVID-19 SPEC COLLECT: HCPCS | Performed by: FAMILY MEDICINE

## 2020-11-17 PROCEDURE — 250N000011 HC RX IP 250 OP 636: Performed by: INTERNAL MEDICINE

## 2020-11-17 PROCEDURE — 81001 URINALYSIS AUTO W/SCOPE: CPT | Performed by: FAMILY MEDICINE

## 2020-11-17 PROCEDURE — 250N000013 HC RX MED GY IP 250 OP 250 PS 637: Performed by: INTERNAL MEDICINE

## 2020-11-17 PROCEDURE — 76604 US EXAM CHEST: CPT | Performed by: FAMILY MEDICINE

## 2020-11-17 PROCEDURE — 250N000011 HC RX IP 250 OP 636: Performed by: FAMILY MEDICINE

## 2020-11-17 PROCEDURE — 71101 X-RAY EXAM UNILAT RIBS/CHEST: CPT | Mod: RT

## 2020-11-17 PROCEDURE — 83880 ASSAY OF NATRIURETIC PEPTIDE: CPT | Performed by: FAMILY MEDICINE

## 2020-11-17 PROCEDURE — 85025 COMPLETE CBC W/AUTO DIFF WBC: CPT | Performed by: FAMILY MEDICINE

## 2020-11-17 RX ORDER — PRAMIPEXOLE DIHYDROCHLORIDE 0.12 MG/1
.125-.25 TABLET ORAL AT BEDTIME
Status: DISCONTINUED | OUTPATIENT
Start: 2020-11-17 | End: 2020-11-19 | Stop reason: HOSPADM

## 2020-11-17 RX ORDER — WARFARIN SODIUM 5 MG/1
10 TABLET ORAL
Status: COMPLETED | OUTPATIENT
Start: 2020-11-17 | End: 2020-11-17

## 2020-11-17 RX ORDER — GABAPENTIN 600 MG/1
600 TABLET ORAL 3 TIMES DAILY
Status: DISCONTINUED | OUTPATIENT
Start: 2020-11-17 | End: 2020-11-19 | Stop reason: HOSPADM

## 2020-11-17 RX ORDER — SIMVASTATIN 20 MG
20 TABLET ORAL AT BEDTIME
Status: DISCONTINUED | OUTPATIENT
Start: 2020-11-17 | End: 2020-11-19 | Stop reason: HOSPADM

## 2020-11-17 RX ORDER — ALBUTEROL SULFATE 90 UG/1
2 AEROSOL, METERED RESPIRATORY (INHALATION) EVERY 6 HOURS PRN
Status: DISCONTINUED | OUTPATIENT
Start: 2020-11-17 | End: 2020-11-19 | Stop reason: HOSPADM

## 2020-11-17 RX ORDER — BUPROPION HYDROCHLORIDE 150 MG/1
150 TABLET ORAL AT BEDTIME
Status: DISCONTINUED | OUTPATIENT
Start: 2020-11-17 | End: 2020-11-19 | Stop reason: HOSPADM

## 2020-11-17 RX ORDER — FUROSEMIDE 10 MG/ML
40 INJECTION INTRAMUSCULAR; INTRAVENOUS ONCE
Status: COMPLETED | OUTPATIENT
Start: 2020-11-17 | End: 2020-11-17

## 2020-11-17 RX ORDER — ALLOPURINOL 300 MG/1
300 TABLET ORAL DAILY
Status: DISCONTINUED | OUTPATIENT
Start: 2020-11-17 | End: 2020-11-19 | Stop reason: HOSPADM

## 2020-11-17 RX ORDER — GABAPENTIN 300 MG/1
300 CAPSULE ORAL AT BEDTIME
Status: DISCONTINUED | OUTPATIENT
Start: 2020-11-17 | End: 2020-11-19 | Stop reason: HOSPADM

## 2020-11-17 RX ORDER — FUROSEMIDE 10 MG/ML
40 INJECTION INTRAMUSCULAR; INTRAVENOUS EVERY 8 HOURS
Status: DISCONTINUED | OUTPATIENT
Start: 2020-11-17 | End: 2020-11-18

## 2020-11-17 RX ORDER — POTASSIUM CHLORIDE 750 MG/1
10 TABLET, EXTENDED RELEASE ORAL DAILY
Status: DISCONTINUED | OUTPATIENT
Start: 2020-11-17 | End: 2020-11-19 | Stop reason: HOSPADM

## 2020-11-17 RX ORDER — LAMOTRIGINE 100 MG/1
100 TABLET ORAL DAILY
Status: DISCONTINUED | OUTPATIENT
Start: 2020-11-17 | End: 2020-11-19 | Stop reason: HOSPADM

## 2020-11-17 RX ORDER — ARIPIPRAZOLE 10 MG/1
10 TABLET ORAL DAILY
Status: DISCONTINUED | OUTPATIENT
Start: 2020-11-17 | End: 2020-11-19 | Stop reason: HOSPADM

## 2020-11-17 RX ORDER — CEPHALEXIN 500 MG/1
1 CAPSULE ORAL 4 TIMES DAILY
Status: ON HOLD | COMMUNITY
Start: 2020-11-11 | End: 2020-11-19

## 2020-11-17 RX ADMIN — PRAMIPEXOLE DIHYDROCHLORIDE 0.25 MG: 0.12 TABLET ORAL at 21:01

## 2020-11-17 RX ADMIN — GABAPENTIN 300 MG: 300 CAPSULE ORAL at 21:00

## 2020-11-17 RX ADMIN — ALLOPURINOL 300 MG: 300 TABLET ORAL at 20:55

## 2020-11-17 RX ADMIN — WARFARIN SODIUM 10 MG: 5 TABLET ORAL at 20:55

## 2020-11-17 RX ADMIN — FUROSEMIDE 40 MG: 10 INJECTION, SOLUTION INTRAMUSCULAR; INTRAVENOUS at 20:53

## 2020-11-17 RX ADMIN — BUPROPION HYDROCHLORIDE 150 MG: 150 TABLET, FILM COATED, EXTENDED RELEASE ORAL at 21:01

## 2020-11-17 RX ADMIN — FLUTICASONE FUROATE AND VILANTEROL TRIFENATATE 1 PUFF: 200; 25 POWDER RESPIRATORY (INHALATION) at 21:19

## 2020-11-17 RX ADMIN — ARIPIPRAZOLE 10 MG: 10 TABLET ORAL at 21:20

## 2020-11-17 RX ADMIN — FUROSEMIDE 40 MG: 10 INJECTION, SOLUTION INTRAMUSCULAR; INTRAVENOUS at 12:29

## 2020-11-17 RX ADMIN — TRAZODONE HYDROCHLORIDE 150 MG: 100 TABLET ORAL at 21:00

## 2020-11-17 RX ADMIN — OMEPRAZOLE 20 MG: 20 CAPSULE, DELAYED RELEASE ORAL at 20:54

## 2020-11-17 RX ADMIN — LAMOTRIGINE 100 MG: 100 TABLET ORAL at 21:20

## 2020-11-17 RX ADMIN — GABAPENTIN 600 MG: 600 TABLET, FILM COATED ORAL at 21:00

## 2020-11-17 RX ADMIN — SIMVASTATIN 20 MG: 20 TABLET, FILM COATED ORAL at 21:01

## 2020-11-17 RX ADMIN — POTASSIUM CHLORIDE 10 MEQ: 750 TABLET, FILM COATED, EXTENDED RELEASE ORAL at 21:02

## 2020-11-17 ASSESSMENT — ENCOUNTER SYMPTOMS
DIFFICULTY URINATING: 1
DYSURIA: 0
DIAPHORESIS: 0
DIARRHEA: 0
SINUS PRESSURE: 0
VOMITING: 0
PALPITATIONS: 0
WHEEZING: 1
CONSTIPATION: 1
FREQUENCY: 0
ABDOMINAL PAIN: 0
NAUSEA: 0
SORE THROAT: 0
HEADACHES: 0
FEVER: 0
CHILLS: 0
BLOOD IN STOOL: 0
COUGH: 0
SHORTNESS OF BREATH: 1

## 2020-11-17 ASSESSMENT — ACTIVITIES OF DAILY LIVING (ADL): ADLS_ACUITY_SCORE: 15

## 2020-11-17 ASSESSMENT — MIFFLIN-ST. JEOR: SCORE: 1654.75

## 2020-11-17 NOTE — TELEPHONE ENCOUNTER
Triage Call:    Pt states she felt bad yesterday, states she felt very cold.  Unsure if she had a fever.   Pt states she fell, legs felt like rubber and gave out on her. Hurt her back when she fell. States she scrapped her back on the bed when she fell.     Pt was advised of protocol recommendation/disposition of call 911 due to weakness in 1 leg following fall.  Pt stated understanding and will call 911 for evaluation.     Angelina Vinson RN 11/17/20 8:21 AM  [facility] Nurse Advisor    COVID 19 Nurse Triage Plan/Patient Instructions    Please be aware that novel coronavirus (COVID-19) may be circulating in the community. If you develop symptoms such as fever, cough, or SOB or if you have concerns about the presence of another infection including coronavirus (COVID-19), please contact your health care provider or visit [email or web address].     Disposition/Instructions    Call to EMS/911 recommended. Follow protocol based instructions.     Bring Your Own Device:  Please also bring your smart device(s) (smart phones, tablets, laptops) and their charging cables for your personal use and to communicate with your care team during your visit.    Thank you for taking steps to prevent the spread of this virus.  o Limit your contact with others.  o Wear a simple mask to cover your cough.  o Wash your hands well and often.    Resources    [facility]: About COVID-19: [email or web address]    CDC: What to Do If You're Sick: www.cdc.gov/coronavirus/2019-ncov/about/steps-when-sick.html    CDC: Ending Home Isolation: www.cdc.gov/coronavirus/2019-ncov/hcp/disposition-in-home-patients.html     CDC: Caring for Someone: www.cdc.gov/coronavirus/2019-ncov/if-you-are-sick/care-for-someone.html     [facility]: Interim Guidance for Hospital Discharge to Home: www.health.[facility].[place]/diseases/coronavirus/hcp/hospdischarge.pdf    [facility] clinical trials (COVID-19 research studies):  clinicalaffairs.[facility]-clinical-trials     Below are the COVID-19 hotlines at the [place] Department of [facility] ([facility]). Interpreters are available.   o For health questions: Call 433-513-3825 or 1-369.297.9145 (7 a.m. to 7 p.m.)  o For questions about schools and childcare: Call 178-018-6325 or 1-294.119.6886 (7 a.m. to 7 p.m.)                   Reason for Disposition    Weakness (i.e., paralysis, loss of muscle strength) of the leg(s) or foot and sudden onset after back injury     Left leg, started suddenly right after falling.    Additional Information    Negative: Dangerous mechanism of injury (e.g., MVA, contact sports, trampoline, diving, fall > 10 feet or 3 meters) (EXCEPTION: back pain began > 1 hour after injury)    Protocols used: BACK INJURY-A-[place]

## 2020-11-17 NOTE — PLAN OF CARE
[place] [facility] [place]    Patient admitted to room 2313 at approximately 1600 via cart from emergency room. Patient was accompanied by transport tech.     Verbal SBAR report received from Meghna GILLIAM prior to patient arrival.     Patient ambulated to bed with stand-by assist. Patient alert and oriented X 3. The patient is not having any pain.  . Admission vital signs: Blood pressure 118/67, pulse 88, temperature 98.9  F (37.2  C), temperature source Oral, resp. rate 18, weight 112 kg (246 lb 14.6 oz), last menstrual period 09/27/2009, SpO2 90 %, not currently breastfeeding. Patient was oriented to plan of care, call light, bed controls, tv, telephone, bathroom and visiting hours.     Risk Assessment    The following safety risks were identified during admission: fall. Yellow risk band applied: YES.     Skin Initial Assessment    This writer admitted this patient and completed a full skin assessment and Brandon score in the Adult PCS flowsheet. Appropriate interventions initiated as needed.     Secondary skin check completed by Linnette Anglin.         Education    Patient has a [place] to Observation order: No  Observation education completed and documented: N/A      Rochelle King RN

## 2020-11-17 NOTE — ED NOTES
RN called home durable med equip to see if we could have bipap delivered to home.  Will await call back.

## 2020-11-17 NOTE — PLAN OF CARE
"Nursing Assessment:   Patient is alert and oriented x4. She denies pain, some general discomfort in R lower side (abrasion where she fell). Pt vss on 1 L of oxygen via nasal cannula.   She was able to walk from cart to bed with ao1 using walker.   Lungs are diminished. Pt reports she didn't sleep all night and she wants to rest.     Vital signs:  Temp: 98.2  F (36.8  C) Temp src: Oral BP: 121/70 Pulse: 82   Resp: 20 SpO2: 93 % O2 Device: Nasal cannula Oxygen Delivery: 1 LPM Height: 147.3 cm (4' 10\") Weight: 116 kg (255 lb 11.7 oz)  Estimated body mass index is 53.45 kg/m  as calculated from the following:    Height as of this encounter: 1.473 m (4' 10\").    Weight as of this encounter: 116 kg (255 lb 11.7 oz).     Rochelle King RN on 11/17/2020 at 4:54 PM      "

## 2020-11-17 NOTE — ED PROVIDER NOTES
"  History     Chief Complaint   Patient presents with     Fall     fell 7 pm last evening, abrazed lower back on bed. today  \"numbness in extremities\" hx COPD O2 sat 90%, temp 99.6     HPI  Bia Bill is a 54 year old female who presents as a 54-year-old female who had presented to the emergency department early November for leg and abdominal pain that had been chronic.  She had long chronic leg swelling as well.  Dyspnea on exertion and orthopnea.  There was the possibility of abdominal wall cellulitis that she had been treated for at that time and a congestive heart failure evaluation that demonstrated normal left ventricular systolic dysfunction with increased right-sided pressures.  Her BNP at the time of been 1861 Covid testing was negative.  She had chest x-ray changes with interstitial prominence she was diuresed here.    She was recommended for BiPAP but had not yet been using it and this is recommended at home.  Treated for bipolar type I disorder diabetes type 2 with an A1c of 5.6, prior DVT and on warfarin.    She arrives here this morning by paramedics and they were called because of a fall that occurred last evening when she was standing at the edge of her bed felt that her legs were weak and fell backwards onto the bed but there is a bar on the bed and she struck her right side against this.  She denied any head or neck injury.  There was no associated with focal weakness.  She has had a chronic dyspnea that she notes was similar to prior.  Her initial O2 sats on presentation here were as low as 89 to 90% but also associated with some wheezing.  No inhaler or nebulizer use this morning.    Paramedic evaluation in the ambulance demonstrated a glucose that approached 200.  She had reassuring vital signs.  No major trauma on their exam.    Allergies:  Allergies   Allergen Reactions     Darvocet [Propoxyphene N-Apap] Anaphylaxis     Darvocet,Percocet      Percocet [Oxycodone-Acetaminophen] " Anaphylaxis, Hives and Swelling     Has tolerated hydromorphone in the past.      Asa [Aspirin] Hives     Aspirin causes seizures and hives, throat swelling.   Has tolerated ketorolac in the past.        Bee      Ibuprofen Swelling     Throat swelling per patient      Lyrica [Pregabalin] Dizziness and Swelling     Povidone Iodine Rash     Reaction to topical betadine     Tape [Adhesive Tape] Rash       Problem List:    Patient Active Problem List    Diagnosis Date Noted     Acute congestive heart failure, unspecified heart failure type (H) 11/08/2020     Priority: Medium     Chronic respiratory failure with hypercapnia (H) 11/08/2020     Priority: Medium     VBG 11/8/2020 7.36/61/16       Hyperlipidemia 11/08/2020     Priority: Medium     Elevated troponin 11/08/2020     Priority: Medium     Thrombocytopenia (H) 06/22/2020     Priority: Medium     Chronic since 2008. 'Stable' splenomegally on CT 11/3/2020       Type 2 diabetes mellitus (H) 12/10/2019     Priority: Medium     A1c Oct 2019 = 6.1       Lymphedema of both lower extremities 10/29/2019     Priority: Medium     Chronic gout without tophus 10/03/2018     Priority: Medium     Conductive hearing loss of left ear with restricted hearing of right ear 04/17/2018     Priority: Medium     Sensorineural hearing loss (SNHL) of right ear with restricted hearing of left ear 04/17/2018     Priority: Medium     Cyst of left ovary 01/11/2016     Priority: Medium     Morbid obesity (H) 06/17/2015     Priority: Medium     Vitamin D deficiency 11/05/2014     Priority: Medium     Somatization disorder 07/10/2014     Priority: Medium     Benign neoplasm of colon (POLYPOSIS) 06/04/2014     Priority: Medium     Notes Recorded by Moris Thomas MD on 4/25/2014 at 1:36 PM- Adenomatous polyp colonoscopy 5 years  Collected: 11/7/2017 Colon at 30 cm, mucosal biopsy: Hyperplastic polyp, with focal changes suggestive of sessile serrated       DDD (degenerative disc disease),  cervical 12/17/2013     Priority: Medium     [facility] 09/03/2013     Priority: Medium     Status:  Accepted  Care Coordinator:  Alla Fernandez    See Letters for [facility] Care Plan  Date:  October 20, 2014         Rosacea 08/26/2013     Priority: Medium     Moderate mixed bipolar I disorder (H) 10/13/2011     Priority: Medium     Personality disorder, depressive 10/13/2011     Priority: Medium     Tobacco use disorder 04/01/2011     Priority: Medium     GILES (Obstructive Sleep Apnea)/ Obesity Hypoventilation 03/01/2010     Priority: Medium     Sleep study 2/24/2010 (209#) - AHI 15.7, RDI 48.3, low O2 86%, PLMI 114  [facility] Sleep study 1/6/20 (241#) - BIPAP 22/7 and 23/8 with improvement in events and hypoventilation        RLS (restless legs syndrome) 03/01/2010     Priority: Medium     Ferritin 11.       Fatty liver 08/28/2009     Priority: Medium     US on 8/26/2009        Hypothyroidism 05/13/2008     Priority: Medium     was hyperthyroid - Hashimoto's and graves and had iodine treatment done at [facility] in early 2008.    Now hypothyroid - is following at [facility], on 125mcg levothyroxine - see scanned documents       Esophageal reflux 04/29/2008     Priority: Medium     Developmental reading disorder 09/12/2007     Priority: Medium     Chondromalacia of patella 02/27/2007     Priority: Medium     Secondary polycythemia      Priority: Medium     Betheda HGB, a high-oxygen affinity hemoglobin which results in a normal compensatory erythrocytosis.    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 Patients with high oxygen affinity hemoglobin typically have higher hematocrit level from compensatory erythrocytosis and as long as they are asymptomatic they do not need phlebotomy. Given her previous history of 'peripheral artery disease' and 'stroke' Hematology recommended phlebotomy monthly with goal HCT<55% 2014.        Alcohol abuse, in remission  08/01/2006     Priority: Medium     Quit 1996 after court ordered, lost her children,        Mild persistent asthma 07/11/2006     Priority: Medium     PFT 9/13/2013  FEV1 moderately reduced. FVC moderately reduced. FEV1/FVC ratio normal. 2. Bronchodilator Response: No significant change is seen with bronchodilators. Not consistent with COPD       Chronic leg pain 07/11/2006     Priority: Medium     Has had chronic pain in the left groin and upper leg secondary to a blood clot in the thigh, treated with neurontin without benefit, still has chronic pain since 2005, sharp shooting intermittantly.       History of deep venous thrombosis 07/11/2006     Priority: Medium     She had a DVT post pregnancy and delivery in 1992.   Hospitalized at [facility] for extensive left lower leg DVT 3/26/2004: This occurred a month after pneumonia episode and decreased activity.  She had lytic therapy, tPA followed by Possis mechanical thrombectomy device and three stents in veins.  She did have Groshong catheter at the time.  There was a positive lupus anticoagulant, negative Factor V and cardiolipin clement.           Past Medical History:    Past Medical History:   Diagnosis Date     Acromioclavicular joint arthritis 01/25/2017     Acute bronchospasm 08/15/2016     Acute gouty arthritis 08/04/2014     Anxiety state, unspecified      Bipolar I disorder, most recent episode (or current) unspecified      Closed fracture of metatarsal bone 06/05/2007     COPD exacerbation (H) 10/29/2019     Depressive disorder, not elsewhere classified      DVT, lower extremity (H)      Headache 10/17/2014     History of stroke symptoms, stroke ruled out on imaging 2011     Impingement syndrome of shoulder region 01/25/2017     Polycythemia, secondary      Right shoulder pain 10/14/2014     Trochanteric bursitis of both hips 01/11/2016       Past Surgical History:    Past Surgical History:   Procedure Laterality Date     BONE MARROW BIOPSY, BONE SPECIMEN,  NEEDLE/TROCAR N/A 11/17/2014    Procedure: BIOPSY BONE MARROW;  Surgeon: Hay Aaron MD;  Location: [facility]     D & C  10/26/2009    with uterine ablation     ENDOVASCULAR PLACEMENT VASCULAR DEVICE Left 2004    Left leg venous stent x 3     ESOPHAGOSCOPY, GASTROSCOPY, DUODENOSCOPY (EGD), COMBINED  04/21/2014    Procedure: Gastroscopy;  Surgeon: Moris Thomas MD;  Location: [facility]     HYSTERECTOMY, PAP NO LONGER INDICATED  01/04/2010     LAPAROSCOPIC CHOLECYSTECTOMY N/A 01/04/2019    Procedure: Laparoscopic cholecystectomy;  Surgeon: Aaron Siegel MD;  Location: [facility]     LITHOTRIPSY  2004    Lithotrypsy       Family History:    Family History   Problem Relation Age of Onset     Hypertension Mother      Diabetes Mother      Blood Disease Mother      Heart Disease Mother         chf     Cerebrovascular Disease Mother      Hypertension Father      Cancer Father         lung cancer     Allergies Sister      Diabetes Sister      Depression Sister      Hypertension Sister        Social History:  Marital Status:   [4]  Social History     Tobacco Use     Smoking status: Current Every Day Smoker     Packs/day: 0.50     Years: 43.00     Pack years: 21.50     Types: Cigarettes     Smokeless tobacco: Never Used     Tobacco comment: started at age 11.  Quit during pregnancyX4.  Quit 3 months ago.    Substance Use Topics     Alcohol use: No     Alcohol/week: 0.0 standard drinks     Comment: quit 1995     Drug use: No     Comment: past hx 22 years ago/ uppers downers, canibus        Medications:         acetaminophen (TYLENOL) 500 MG tablet       ADVAIR -21 MCG/ACT inhaler       albuterol (PROAIR HFA/PROVENTIL HFA/VENTOLIN HFA) 108 (90 Base) MCG/ACT inhaler       albuterol (PROVENTIL) (2.5 MG/3ML) 0.083% neb solution       alcohol swab prep pads       allopurinol (ZYLOPRIM) 300 MG tablet       ARIPiprazole (ABILIFY) 10 MG tablet       blood glucose (NO BRAND SPECIFIED) test strip       blood  glucose calibration (NO BRAND SPECIFIED) solution       blood glucose monitoring (NO BRAND SPECIFIED) meter device kit       buPROPion (WELLBUTRIN XL) 150 MG 24 hr tablet       EPINEPHrine (ANY BX GENERIC EQUIV) 0.3 MG/0.3ML injection 2-pack       furosemide (LASIX) 40 MG tablet       gabapentin (NEURONTIN) 300 MG capsule       gabapentin (NEURONTIN) 600 MG tablet       ipratropium - albuterol 0.5 mg/2.5 mg/3 mL (DUONEB) 0.5-2.5 (3) MG/3ML neb solution       lamoTRIgine (LAMICTAL) 100 MG tablet       levothyroxine (SYNTHROID/LEVOTHROID) 175 MCG tablet       mometasone-formoterol (DULERA) 200-5 MCG/ACT oral inhaler       omeprazole (PRILOSEC) 20 MG DR capsule       order for DME       order for DME       order for DME       order for DME       order for DME       potassium chloride ER (KLOR-CON M) 10 MEQ CR tablet       pramipexole (MIRAPEX) 0.125 MG tablet       simvastatin (ZOCOR) 20 MG tablet       thin (NO BRAND SPECIFIED) lancets       traZODone (DESYREL) 150 MG tablet       trimethoprim-polymyxin b (POLYTRIM) 75794-9.1 UNIT/ML-% ophthalmic solution       VITAMIN D3 25 MCG (1000 UT) tablet       warfarin ANTICOAGULANT (JANTOVEN ANTICOAGULANT) 5 MG tablet          Review of Systems   Constitutional: Negative for chills, diaphoresis and fever.   HENT: Negative for ear pain, sinus pressure and sore throat.    Eyes: Negative for visual disturbance.   Respiratory: Positive for shortness of breath and wheezing. Negative for cough.    Cardiovascular: Positive for leg swelling. Negative for chest pain and palpitations.   Gastrointestinal: Positive for constipation. Negative for abdominal pain, blood in stool, diarrhea, nausea and vomiting.   Genitourinary: Positive for difficulty urinating. Negative for dysuria, frequency and urgency.   Skin: Negative for rash.   Neurological: Negative for headaches.   All other systems reviewed and are negative.      Physical Exam   BP: 117/67  Pulse: 89  Temp: 98.9  F (37.2  C)  Resp:  18  Weight: 112 kg (246 lb 14.6 oz)  SpO2: (!) 88 %      Physical Exam  Constitutional:       General: She is in acute distress.      Appearance: She is not toxic-appearing or diaphoretic.   HENT:      Head: Atraumatic.      Nose: Nose normal.   Eyes:      Conjunctiva/sclera: Conjunctivae normal.   Neck:      Musculoskeletal: Neck supple.   Cardiovascular:      Rate and Rhythm: Normal rate and regular rhythm.      Pulses: Normal pulses.      Heart sounds: Normal heart sounds. No murmur.   Pulmonary:      Effort: Pulmonary effort is normal. No respiratory distress.      Breath sounds: Normal breath sounds. No stridor. No wheezing or rhonchi.   Abdominal:      General: Abdomen is flat. Bowel sounds are normal. There is distension.      Tenderness: There is abdominal tenderness.   Musculoskeletal:      Right lower leg: Edema present.      Left lower leg: Edema present.   Skin:     Coloration: Skin is not pale.      Findings: No rash.   Neurological:      General: No focal deficit present.      Mental Status: She is alert.      Motor: No weakness.         Head is atraumatic.  There is no signs of ecchymosis.  No arechiga sign or raccoon's eyes.  No drainage from the ears or nose.  No epistaxis.  Jaw opens and closes without difficulty no malocclusion.  She has no significant neck pain midline and no decreased range of motion.  There is tenderness to palpation over the low back right side with abrasions.  No active bleeding.  This is tender to palpation over the lower rib margin on the side.  No significant ecchymosis.    Her abdomen is distended she notes is chronic.  There is no new abdominal pain but she does have tenderness to palpation over the anterior abdomen.  She also notes this is not new.  No obvious focal areas of tenderness.  No ecchymosis over the abdomen.  No signs of trauma.    ED Course        Procedures                 EKG Interpretation:      Interpreted by Roger Rodriguez MD  EKG demonstrates a sinus  rhythm at 86 bpm with a normal axis and no ST change.  No T wave changes.  There is a normal R wave progression.  No Q waves.  Normal intervals.  Normal conduction with exception RSR' lead v1.  No ectopy.  Impression sinus rhythm 86 bpm -no substantial change from EKG done November 8, 2020.    Critical Care time:  none               Results for orders placed or performed during the hospital encounter of 11/17/20 (from the past 24 hour(s))   INR   Result Value Ref Range    INR 2.37 (H) 0.86 - 1.14   CBC with platelets differential   Result Value Ref Range    WBC 15.0 (H) 4.0 - 11.0 10e9/L    RBC Count 4.60 3.8 - 5.2 10e12/L    Hemoglobin 10.8 (L) 11.7 - 15.7 g/dL    Hematocrit 38.2 35.0 - 47.0 %    MCV 83 78 - 100 fl    MCH 23.5 (L) 26.5 - 33.0 pg    MCHC 28.3 (L) 31.5 - 36.5 g/dL    RDW 21.6 (H) 10.0 - 15.0 %    Platelet Count 118 (L) 150 - 450 10e9/L    Diff Method Automated Method     % Neutrophils 85.9 %    % Lymphocytes 6.1 %    % Monocytes 7.0 %    % Eosinophils 0.1 %    % Basophils 0.3 %    % Immature Granulocytes 0.6 %    Nucleated RBCs 0 0 /100    Absolute Neutrophil 12.9 (H) 1.6 - 8.3 10e9/L    Absolute Lymphocytes 0.9 0.8 - 5.3 10e9/L    Absolute Monocytes 1.1 0.0 - 1.3 10e9/L    Absolute Eosinophils 0.0 0.0 - 0.7 10e9/L    Absolute Basophils 0.0 0.0 - 0.2 10e9/L    Abs Immature Granulocytes 0.1 0 - 0.4 10e9/L    Absolute Nucleated RBC 0.0    Comprehensive metabolic panel   Result Value Ref Range    Sodium 137 133 - 144 mmol/L    Potassium 4.0 3.4 - 5.3 mmol/L    Chloride 102 94 - 109 mmol/L    Carbon Dioxide 33 (H) 20 - 32 mmol/L    Anion Gap 2 (L) 3 - 14 mmol/L    Glucose 132 (H) 70 - 99 mg/dL    Urea Nitrogen 19 7 - 30 mg/dL    Creatinine 0.86 0.52 - 1.04 mg/dL    GFR Estimate 77 >60 mL/min/[1.73_m2]    GFR Estimate If Black 89 >60 mL/min/[1.73_m2]    Calcium 8.7 8.5 - 10.1 mg/dL    Bilirubin Total 0.9 0.2 - 1.3 mg/dL    Albumin 3.3 (L) 3.4 - 5.0 g/dL    Protein Total 6.3 (L) 6.8 - 8.8 g/dL    Alkaline  Phosphatase 102 40 - 150 U/L    ALT 26 0 - 50 U/L    AST 16 0 - 45 U/L   Nt probnp inpatient   Result Value Ref Range    N-Terminal Pro BNP Inpatient 2,544 (H) 0 - 900 pg/mL   Ribs XR, unilat 3 views + PA chest, right    Narrative    XR RIBS & CHEST RT 3VW 11/17/2020 10:49 AM    HISTORY: Fall with chest pain.    COMPARISON: November 8, 2020    FINDINGS: Cardiomegaly is present. The pulmonary vascular markings  appear within normal limits. The lungs are clear without focal  infiltrates. No pneumothorax. On the rib detail views, no rib  fractures are identified.      Impression    IMPRESSION: Cardiomegaly. No rib fractures.    MERA JORDAN MD   POC US CHEST B-SCAN    Impression    [facility] Procedure Note      Limited Bedside ED Cardiac Ultrasound:    PROCEDURE: PERFORMED BY: Dr. Roger Rodriguez MD  INDICATIONS/SYMPTOM:  Shortness of Breath  PROBE: Cardiac phased array probe and High frequency linear probe  BODY LOCATION: Chest  FINDINGS:   The ultrasound was performed utilizing the subcostal, parasternal long axis and parasternal short axis views.  Cardiac contractility:  Present  Gross estimation of cardiac kinesis: hyperkinetic  Pericardial Effusion:  None  RV:LV ratio: LV > RV  IVC:    Diameter:  IVC diameter expiration (IVCe) 2-3 cm                                                   IVC diameter inspiration (IVCi) 2-3 cm                                                       Collapsibility:  IVC collapses < 50% with inspiration  INTERPRETATION:    Chamber size and motion were grossly normal with LV > RV, normal cardiac kinesis.  No pericardial effusion was found.  IVC visualized and findings indicate hypervolemia.  IMAGE DOCUMENTATION: Images were archived to PACs system.        [facility] Procedure Note      Limited Bedside ED Ultrasound of Thorax:    PROCEDURE: PERFORMED BY: Dr. Roger Rodriguez MD  INDICATIONS/SYMPTOM:  shortness of breath  PROBE: High frequency linear probe  BODY LOCATION:  Chest  FINDINGS:  Images of both lung hemithoracies taken in 2D in multiple rib spaces        Right side:  Lung sliding artifact  Present     Comet tail artifacts  Absent   Left side:  Lung sliding artifact  Present     Comet tail artifacts  Unable to visualize   Hemothorax: Right side Absent     Left side Absent   Pleural effusion: Right side Absent      Left side Absent    INTERPRETATION: The exam was normal. There was no free fluid identified in the chest cavity. No evidence of pneumothorax, hemothorax or pleural effusion.  IMAGE DOCUMENTATION: Images were archived to PACs system.       UA with Microscopic   Result Value Ref Range    Color Urine Yellow     Appearance Urine Cloudy     Glucose Urine Negative NEG^Negative mg/dL    Bilirubin Urine Negative NEG^Negative    Ketones Urine Negative NEG^Negative mg/dL    Specific Gravity Urine 1.016 1.003 - 1.035    Blood Urine Negative NEG^Negative    pH Urine 5.0 5.0 - 7.0 pH    Protein Albumin Urine 30 (A) NEG^Negative mg/dL    Urobilinogen mg/dL 0.0 0.0 - 2.0 mg/dL    Nitrite Urine Negative NEG^Negative    Leukocyte Esterase Urine Negative NEG^Negative    Source Midstream Urine     WBC Urine 3 0 - 5 /HPF    RBC Urine 2 0 - 2 /HPF    Bacteria Urine Few (A) NEG^Negative /HPF    Squamous Epithelial /HPF Urine 24 (H) 0 - 1 /HPF    Mucous Urine Present (A) NEG^Negative /LPF    Hyaline Casts 61 (H) 0 - 2 /LPF     *Note: Due to a large number of results and/or encounters for the requested time period, some results have not been displayed. A complete set of results can be found in Results Review.       Medications   albuterol (PROAIR HFA/PROVENTIL HFA/VENTOLIN HFA) 108 (90 Base) MCG/ACT inhaler 2 puff (has no administration in time range)   furosemide (LASIX) injection 40 mg (40 mg Intravenous Given 11/17/20 1229)       Wt Readings from Last 5 Encounters:   11/17/20 112 kg (246 lb 14.6 oz)   11/11/20 112.4 kg (247 lb 12.8 oz)   11/05/20 109.8 kg (242 lb)   11/05/20 109.8 kg  (242 lb)   10/29/20 109.8 kg (242 lb)       Assessments & Plan (with Medical Decision Making)     MDM:Bia Bill is a 54 year old female who presents with fall that occurred last evening when her legs were weak.  No associated chest pain.  Chronic dyspnea.  Fall onto her right side striking what appears to be the right lower rib margin.  There is abraded area here but no obvious bleeding and no ecchymosis.  Some tenderness to palpation in this region.  She has no other signs of trauma and apparently she fell backwards onto her bed.  She does have a borderline hypoxia on presentation but this appears to be variable.  She is given an albuterol on her arrival.  We will do a broad-based evaluation to recheck INR confirm no significant elevation.  We will also obtain chest x-ray with rib films to evaluate for rib fracture on this side as well as any pleural effusion/hemothorax.  We will also obtain syncopal labs CBC comprehensive panel EKG and the chest x-ray.    Her weight is increased to 112 kg. Weight was 109 on 11/5/2020.  She has a bedside ultrasound demonstrates an IVC that is dilated to 2 to 3 cm. Her chest x-ray shows no vascular congestion. Her BNP is up from baseline.  Patient had borderline hypoxia and periodic tachypnea here. This may be secondary to failure. We'll give IV Lasix. She may need hospitalization but at this point may be able to stabilize her status in the emergency department with discharge home. She will have a BiPAP that we are arranging for her to go home with today. She clearly has sleep apnea.    I have placed the order for BiPAP at home.  Was with the assistance with Wesley and RT who was able to review the records and identify the settings that were effective for her during her last hospitalization.  She failed CPAP with desaturation but the BiPAP was effective.  Clear diagnosis with sleep apnea.  Orders placed.    During the patient's stay and despite inhaled albuterol and IV Lasix,  patient would desaturate while awake down into the upper 80% range.  She is not on home oxygen.  She would desat to the low 70% range when she was asleep.  We were able to coordinate during her stay for her obtaining BiPAP but unfortunately even while she is awake her oxygen is fallen to fall into the mid 80% range and I was unable therefore to discharge home despite several trials of management.      I have reviewed the nursing notes.    I have reviewed the findings, diagnosis, plan and need for follow up with the patient.       ED to Inpatient Handoff:    Discussed with Dr. Luz   Patient accepted for Observation Stay  Pending studies include none  Code Status: Not Addressed           New Prescriptions    No medications on file       Final diagnoses:   Hypoxia       11/17/2020   [facility] EMERGENCY DEPT     Roger Rodriguez MD  11/17/20 [id number]       Roger Rodriguez MD  11/21/20 0412

## 2020-11-17 NOTE — ED NOTES
"Pt presents to ED via EMS due to fall last NOC.  Pt has abrasion on middle right back.  Scraped her back against the bed.  Did not hit head.  Pt takes blood thinners.  Hx of afib, CHF, and diabetes.  Current smoker.  Pt is SOA, and has increased WOB which she states is not new.  Pt is A&Ox4.  States her legs just \"gave out\" when she was going to stand up from sitting position.   "

## 2020-11-18 ENCOUNTER — APPOINTMENT (OUTPATIENT)
Dept: PHYSICAL THERAPY | Facility: CLINIC | Age: 55
End: 2020-11-18
Payer: COMMERCIAL

## 2020-11-18 ENCOUNTER — PATIENT OUTREACH (OUTPATIENT)
Dept: CARE COORDINATION | Facility: CLINIC | Age: 55
End: 2020-11-18

## 2020-11-18 LAB
ANION GAP SERPL CALCULATED.3IONS-SCNC: 1 MMOL/L (ref 3–14)
BASE EXCESS BLDV CALC-SCNC: 3.9 MMOL/L
BASE EXCESS BLDV CALC-SCNC: 4.8 MMOL/L
BUN SERPL-MCNC: 26 MG/DL (ref 7–30)
CALCIUM SERPL-MCNC: 9.1 MG/DL (ref 8.5–10.1)
CHLORIDE SERPL-SCNC: 102 MMOL/L (ref 94–109)
CO2 SERPL-SCNC: 34 MMOL/L (ref 20–32)
CREAT SERPL-MCNC: 1.05 MG/DL (ref 0.52–1.04)
ERYTHROCYTE [DISTWIDTH] IN BLOOD BY AUTOMATED COUNT: 21.4 % (ref 10–15)
GFR SERPL CREATININE-BSD FRML MDRD: 60 ML/MIN/{1.73_M2}
GLUCOSE BLDC GLUCOMTR-MCNC: 143 MG/DL (ref 70–99)
GLUCOSE BLDC GLUCOMTR-MCNC: 169 MG/DL (ref 70–99)
GLUCOSE BLDC GLUCOMTR-MCNC: 171 MG/DL (ref 70–99)
GLUCOSE BLDC GLUCOMTR-MCNC: 228 MG/DL (ref 70–99)
GLUCOSE SERPL-MCNC: 126 MG/DL (ref 70–99)
HCO3 BLDV-SCNC: 32 MMOL/L (ref 21–28)
HCO3 BLDV-SCNC: 32 MMOL/L (ref 21–28)
HCT VFR BLD AUTO: 38.1 % (ref 35–47)
HGB BLD-MCNC: 10.5 G/DL (ref 11.7–15.7)
INR PPP: 2.01 (ref 0.86–1.14)
LABORATORY COMMENT REPORT: NORMAL
MCH RBC QN AUTO: 23.4 PG (ref 26.5–33)
MCHC RBC AUTO-ENTMCNC: 27.6 G/DL (ref 31.5–36.5)
MCV RBC AUTO: 85 FL (ref 78–100)
O2/TOTAL GAS SETTING VFR VENT: ABNORMAL %
O2/TOTAL GAS SETTING VFR VENT: ABNORMAL %
PCO2 BLDV: 59 MM HG (ref 40–50)
PCO2 BLDV: 67 MM HG (ref 40–50)
PH BLDV: 7.29 PH (ref 7.32–7.43)
PH BLDV: 7.34 PH (ref 7.32–7.43)
PLATELET # BLD AUTO: 119 10E9/L (ref 150–450)
PO2 BLDV: 27 MM HG (ref 25–47)
PO2 BLDV: 46 MM HG (ref 25–47)
POTASSIUM SERPL-SCNC: 4.5 MMOL/L (ref 3.4–5.3)
RBC # BLD AUTO: 4.48 10E12/L (ref 3.8–5.2)
SARS-COV-2 RNA SPEC QL NAA+PROBE: NEGATIVE
SARS-COV-2 RNA SPEC QL NAA+PROBE: NORMAL
SODIUM SERPL-SCNC: 137 MMOL/L (ref 133–144)
SPECIMEN SOURCE: NORMAL
SPECIMEN SOURCE: NORMAL
WBC # BLD AUTO: 9.6 10E9/L (ref 4–11)

## 2020-11-18 PROCEDURE — 36415 COLL VENOUS BLD VENIPUNCTURE: CPT | Performed by: INTERNAL MEDICINE

## 2020-11-18 PROCEDURE — 97161 PT EVAL LOW COMPLEX 20 MIN: CPT | Mod: GP | Performed by: PHYSICAL THERAPIST

## 2020-11-18 PROCEDURE — 85027 COMPLETE CBC AUTOMATED: CPT | Performed by: INTERNAL MEDICINE

## 2020-11-18 PROCEDURE — 120N000001 HC R&B MED SURG/OB

## 2020-11-18 PROCEDURE — 250N000012 HC RX MED GY IP 250 OP 636 PS 637: Performed by: FAMILY MEDICINE

## 2020-11-18 PROCEDURE — 250N000013 HC RX MED GY IP 250 OP 250 PS 637: Performed by: FAMILY MEDICINE

## 2020-11-18 PROCEDURE — 250N000011 HC RX IP 250 OP 636: Performed by: INTERNAL MEDICINE

## 2020-11-18 PROCEDURE — 999N001017 HC STATISTIC GLUCOSE BY METER IP

## 2020-11-18 PROCEDURE — 99233 SBSQ HOSP IP/OBS HIGH 50: CPT | Performed by: FAMILY MEDICINE

## 2020-11-18 PROCEDURE — 250N000013 HC RX MED GY IP 250 OP 250 PS 637: Performed by: INTERNAL MEDICINE

## 2020-11-18 PROCEDURE — 999N000157 HC STATISTIC RCP TIME EA 10 MIN

## 2020-11-18 PROCEDURE — 97116 GAIT TRAINING THERAPY: CPT | Mod: GP | Performed by: PHYSICAL THERAPIST

## 2020-11-18 PROCEDURE — 85610 PROTHROMBIN TIME: CPT | Performed by: INTERNAL MEDICINE

## 2020-11-18 PROCEDURE — 82803 BLOOD GASES ANY COMBINATION: CPT | Performed by: FAMILY MEDICINE

## 2020-11-18 PROCEDURE — 36415 COLL VENOUS BLD VENIPUNCTURE: CPT | Performed by: FAMILY MEDICINE

## 2020-11-18 PROCEDURE — 80048 BASIC METABOLIC PNL TOTAL CA: CPT | Performed by: INTERNAL MEDICINE

## 2020-11-18 PROCEDURE — 94660 CPAP INITIATION&MGMT: CPT

## 2020-11-18 RX ORDER — WARFARIN SODIUM 5 MG/1
10 TABLET ORAL
Status: COMPLETED | OUTPATIENT
Start: 2020-11-18 | End: 2020-11-18

## 2020-11-18 RX ORDER — DEXTROSE MONOHYDRATE 25 G/50ML
25-50 INJECTION, SOLUTION INTRAVENOUS
Status: DISCONTINUED | OUTPATIENT
Start: 2020-11-18 | End: 2020-11-18

## 2020-11-18 RX ORDER — POLYMYXIN B SULFATE AND TRIMETHOPRIM 1; 10000 MG/ML; [USP'U]/ML
1-2 SOLUTION OPHTHALMIC EVERY 6 HOURS
Status: DISCONTINUED | OUTPATIENT
Start: 2020-11-18 | End: 2020-11-18

## 2020-11-18 RX ORDER — DEXTROSE MONOHYDRATE 25 G/50ML
25-50 INJECTION, SOLUTION INTRAVENOUS
Status: DISCONTINUED | OUTPATIENT
Start: 2020-11-18 | End: 2020-11-19 | Stop reason: HOSPADM

## 2020-11-18 RX ORDER — NICOTINE POLACRILEX 4 MG
15-30 LOZENGE BUCCAL
Status: DISCONTINUED | OUTPATIENT
Start: 2020-11-18 | End: 2020-11-18

## 2020-11-18 RX ORDER — NICOTINE POLACRILEX 4 MG
15-30 LOZENGE BUCCAL
Status: DISCONTINUED | OUTPATIENT
Start: 2020-11-18 | End: 2020-11-19 | Stop reason: HOSPADM

## 2020-11-18 RX ORDER — FUROSEMIDE 10 MG/ML
40 INJECTION INTRAMUSCULAR; INTRAVENOUS 2 TIMES DAILY
Status: DISCONTINUED | OUTPATIENT
Start: 2020-11-19 | End: 2020-11-19 | Stop reason: HOSPADM

## 2020-11-18 RX ORDER — ACETAMINOPHEN 500 MG
500-1000 TABLET ORAL EVERY 6 HOURS PRN
Status: DISCONTINUED | OUTPATIENT
Start: 2020-11-18 | End: 2020-11-19 | Stop reason: HOSPADM

## 2020-11-18 RX ADMIN — TRAZODONE HYDROCHLORIDE 150 MG: 100 TABLET ORAL at 22:13

## 2020-11-18 RX ADMIN — GABAPENTIN 600 MG: 600 TABLET, FILM COATED ORAL at 22:13

## 2020-11-18 RX ADMIN — FUROSEMIDE 40 MG: 10 INJECTION, SOLUTION INTRAMUSCULAR; INTRAVENOUS at 06:07

## 2020-11-18 RX ADMIN — GABAPENTIN 600 MG: 600 TABLET, FILM COATED ORAL at 08:21

## 2020-11-18 RX ADMIN — ALLOPURINOL 300 MG: 300 TABLET ORAL at 08:21

## 2020-11-18 RX ADMIN — FUROSEMIDE 40 MG: 10 INJECTION, SOLUTION INTRAMUSCULAR; INTRAVENOUS at 13:36

## 2020-11-18 RX ADMIN — POTASSIUM CHLORIDE 10 MEQ: 750 TABLET, FILM COATED, EXTENDED RELEASE ORAL at 08:21

## 2020-11-18 RX ADMIN — GABAPENTIN 300 MG: 300 CAPSULE ORAL at 22:13

## 2020-11-18 RX ADMIN — OMEPRAZOLE 20 MG: 20 CAPSULE, DELAYED RELEASE ORAL at 08:21

## 2020-11-18 RX ADMIN — GABAPENTIN 600 MG: 600 TABLET, FILM COATED ORAL at 13:36

## 2020-11-18 RX ADMIN — BUPROPION HYDROCHLORIDE 150 MG: 150 TABLET, FILM COATED, EXTENDED RELEASE ORAL at 22:13

## 2020-11-18 RX ADMIN — INSULIN ASPART 1 UNITS: 100 INJECTION, SOLUTION INTRAVENOUS; SUBCUTANEOUS at 17:34

## 2020-11-18 RX ADMIN — SIMVASTATIN 20 MG: 20 TABLET, FILM COATED ORAL at 22:13

## 2020-11-18 RX ADMIN — LAMOTRIGINE 100 MG: 100 TABLET ORAL at 08:22

## 2020-11-18 RX ADMIN — PRAMIPEXOLE DIHYDROCHLORIDE 0.12 MG: 0.12 TABLET ORAL at 22:13

## 2020-11-18 RX ADMIN — ACETAMINOPHEN 1000 MG: 500 TABLET, FILM COATED ORAL at 20:46

## 2020-11-18 RX ADMIN — OMEPRAZOLE 20 MG: 20 CAPSULE, DELAYED RELEASE ORAL at 20:43

## 2020-11-18 RX ADMIN — LEVOTHYROXINE SODIUM 175 MCG: 0.15 TABLET ORAL at 06:07

## 2020-11-18 RX ADMIN — WARFARIN SODIUM 10 MG: 5 TABLET ORAL at 17:34

## 2020-11-18 RX ADMIN — FLUTICASONE FUROATE AND VILANTEROL TRIFENATATE 1 PUFF: 200; 25 POWDER RESPIRATORY (INHALATION) at 08:22

## 2020-11-18 RX ADMIN — ARIPIPRAZOLE 10 MG: 10 TABLET ORAL at 08:22

## 2020-11-18 ASSESSMENT — ACTIVITIES OF DAILY LIVING (ADL)
ADLS_ACUITY_SCORE: 15
DEPENDENT_IADLS:: INDEPENDENT
ADLS_ACUITY_SCORE: 15

## 2020-11-18 ASSESSMENT — MIFFLIN-ST. JEOR: SCORE: 1611.75

## 2020-11-18 NOTE — PLAN OF CARE
Vitals stable; has some back pain. On 3L via nasal cannula.  Using lasix to diuresis. Up with stand by assist and walker. Worked to PT today. Plan of care reviewed with patient.

## 2020-11-18 NOTE — PHARMACY-ANTICOAGULATION SERVICE
Clinical Pharmacy - Warfarin Dosing Consult     Pharmacy has been consulted to manage this patient s warfarin therapy.  Indication: DVT/PE Prophylaxis  Therapy Goal: INR 2-3  Provider/Team: DERREK  Warfarin Prior to Admission: Yes  Warfarin PTA Regimen: 12.5mg M, 10mg rest of week.  Dose Comments: 10mg for INR in range    INR   Date Value Ref Range Status   11/17/2020 2.37 (H) 0.86 - 1.14 Final   11/13/2020 1.79 (H) 0.86 - 1.14 Final       Recommend warfarin 10 mg today.  Pharmacy will monitor Bia Bill daily and order warfarin doses to achieve specified goal.      Please contact pharmacy as soon as possible if the warfarin needs to be held for a procedure or if the warfarin goals change.

## 2020-11-18 NOTE — PROGRESS NOTES
[facility]           Assessment & Plan        Bia Bill is a 54 year old female admitted on 11/17/2020. She presents after having some lower extremity weakness and a fall and is noticing more dyspnea on exertion.     Acute cor pulmonale/acute exacerbation of right sided and diastolic heart failure   11/17/20 --   H/o recent right heart failure admission. Weight up 9# from recent discharge, BNP increased, increased dyspnea on exertion, weakness.    - furosemide 40 mg IV q 8 hours   - O2 as needed  11/18/2020 -- improving, diuresing well, creatinine up just slightly.  Slowing lasix to twice daily.  Follow, wean oxygen as able.  Patient slightly more sleepy today although awakens easily - checking VBG.   If slightly acidotic, likely try home BIPAP is available, otherwise may need to transfer to ICU for BIPAP here.       Acute hypercapnic respiratory failure with respiratory acidosis  11/18/2020 -- needs BIPAP for her GILES/hypoventilation syndrome as below and was set up for this but hasn't used it yet.  Setting up home machine, will repeat VBG in a few hours once on this and again in AM.      GILES  Obesity hypoventilation syndrome  Chronic respiratory failure with hypercapnia   11/17/20 --  Patient was set up with home BiPAP in the ED on admission   11/18/2020 -- starting new home BIPAP tonight as above.      Mild persistent asthma without exacerbation     No wheezes.    - continue home Advair     Generalized weakness  Musculoskeletal soft tissue injury    Became weak in lower extremities and fell against the bed. Nonfocal neurologic exam. No clear etiology for the weakness. Right low back pain. Does not seem severe.    - physical therapy eval     Bipolar disorder  11/17/20 --   Appears compensated - continue Abilify, buproprion  11/18/2020 -- stable.      Diabetes mellitus type 2  11/17/20 --   Well controlled with diet- continued  11/18/2020 -- recent A1c was < 6 on 11/8.  Single  elevated glucose so far, added low dose sliding scale insulin.        Restless legs syndrome   - continue mirapex     Erythrocytosis, history of    11/17/20 -- Followed by Dr. Watts. H/o phlebotomy. Hgb 10.8 on admission.   11/18/2020 -- Stable.      Leucocytosis without acute infection    11/17/20 --   On admission, WBC 15.0. New from 11/13. No fever. Suspect demargination.   11/18/2020 -- normalized without intervention.       Chronic thrombocytopenia   11/17/20 --  Mild. platelets 118k   11/18/2020 -- stable.     H/o DVT, positive Lupus anticoagulant    On chronic warfarin   - continue, pharmacy to dose.      Asymptomatic COVID screen negative on admission.          Diet: Consistent Carbohydrate Diet 8171-1807 Calories: Moderate Consistent CHO (4-6 CHO units/meal)      DVT Prophylaxis: Warfarin    Goldsmith Catheter: not present    Code Status:   Full                Disposition  At least 1-2 more days inpatient for continued diuresis/improvement of respiratory status.              Interval History:   No new concerns.  Patient sleeping on arrival and a bit sleepy during conversation, but awakened to voice and remained awake throughout conversation. Says she just feels tired today.  No pain.  Breathing improving.  Edema a bit better.  No new concerns.   No other pain             Review of Systems:    ROS: 10 point ROS neg other than the symptoms noted above in the HPI.           Medications:   Current active medications and PTA medications reviewed, see medication list for details.            Physical Exam:   Vitals were reviewed  Patient Vitals for the past 24 hrs:   BP Temp Temp src Pulse Resp SpO2 Height Weight   11/18/20 0804 129/60 98.3  F (36.8  C) Oral 75 18 -- -- --   11/18/20 0615 106/67 -- -- 76 -- 97 % -- --   11/18/20 0610 -- 98  F (36.7  C) Oral 76 18 97 % -- --   11/18/20 0151 96/45 98  F (36.7  C) Oral 79 18 93 % -- 111.7 kg (246 lb 4.1 oz)   11/17/20 2328 125/55 98.1  F (36.7  C) Oral 81 20 92 % -- --  "  20 1909 127/50 98.4  F (36.9  C) Oral 82 20 92 % -- --   20 1637 121/70 98.2  F (36.8  C) Oral 82 20 93 % 1.473 m (4' 10\") 116 kg (255 lb 11.7 oz)   20 1545 118/67 -- -- 88 -- 90 % -- --   20 1530 117/64 -- -- 90 -- 90 % -- --   20 1515 110/67 -- -- 90 -- 90 % -- --       Temperatures:  Current - Temp: 98.3  F (36.8  C); Max - Temp  Av.2  F (36.8  C)  Min: 98  F (36.7  C)  Max: 98.4  F (36.9  C)  Respiration range: Resp  Av  Min: 18  Max: 20  Pulse range: Pulse  Av.9  Min: 75  Max: 90  Blood pressure range: Systolic (24hrs), Av , Min:96 , Max:129   ; Diastolic (24hrs), Av, Min:45, Max:70    Pulse oximetry range: SpO2  Av.7 %  Min: 90 %  Max: 97 %  I/O last 3 completed shifts:  In: 120 [P.O.:120]  Out: 400 [Urine:400]    Intake/Output Summary (Last 24 hours) at 2020 1503  Last data filed at 2020 0600  Gross per 24 hour   Intake 120 ml   Output 400 ml   Net -280 ml     EXAM:  General: awake and alert, NAD, oriented x 3  Head: normocephalic  Neck: unremarkable, no lymphadenopathy   HEENT: oropharynx pink and moist    Heart: Regular rate and rhythm, no murmurs, rubs, or gallops  Lungs: some crackles at bases bilaterally still, otherwise clear to auscultation bilaterally with good air movement throughout  Abdomen: soft, non-tender, no masses or organomegaly  Extremities: 2+ edema in lower extremities   Skin unremarkable.               Data:     Results for orders placed or performed during the hospital encounter of 20 (from the past 24 hour(s))   UA with Microscopic   Result Value Ref Range    Color Urine Yellow     Appearance Urine Cloudy     Glucose Urine Negative NEG^Negative mg/dL    Bilirubin Urine Negative NEG^Negative    Ketones Urine Negative NEG^Negative mg/dL    Specific Gravity Urine 1.016 1.003 - 1.035    Blood Urine Negative NEG^Negative    pH Urine 5.0 5.0 - 7.0 pH    Protein Albumin Urine 30 (A) NEG^Negative mg/dL    " Urobilinogen mg/dL 0.0 0.0 - 2.0 mg/dL    Nitrite Urine Negative NEG^Negative    Leukocyte Esterase Urine Negative NEG^Negative    Source Midstream Urine     WBC Urine 3 0 - 5 /HPF    RBC Urine 2 0 - 2 /HPF    Bacteria Urine Few (A) NEG^Negative /HPF    Squamous Epithelial /HPF Urine 24 (H) 0 - 1 /HPF    Mucous Urine Present (A) NEG^Negative /LPF    Hyaline Casts 61 (H) 0 - 2 /LPF   Basic metabolic panel   Result Value Ref Range    Sodium 137 133 - 144 mmol/L    Potassium 4.5 3.4 - 5.3 mmol/L    Chloride 102 94 - 109 mmol/L    Carbon Dioxide 34 (H) 20 - 32 mmol/L    Anion Gap 1 (L) 3 - 14 mmol/L    Glucose 126 (H) 70 - 99 mg/dL    Urea Nitrogen 26 7 - 30 mg/dL    Creatinine 1.05 (H) 0.52 - 1.04 mg/dL    GFR Estimate 60 (L) >60 mL/min/[1.73_m2]    GFR Estimate If Black 70 >60 mL/min/[1.73_m2]    Calcium 9.1 8.5 - 10.1 mg/dL   CBC with platelets   Result Value Ref Range    WBC 9.6 4.0 - 11.0 10e9/L    RBC Count 4.48 3.8 - 5.2 10e12/L    Hemoglobin 10.5 (L) 11.7 - 15.7 g/dL    Hematocrit 38.1 35.0 - 47.0 %    MCV 85 78 - 100 fl    MCH 23.4 (L) 26.5 - 33.0 pg    MCHC 27.6 (L) 31.5 - 36.5 g/dL    RDW 21.4 (H) 10.0 - 15.0 %    Platelet Count 119 (L) 150 - 450 10e9/L   INR   Result Value Ref Range    INR 2.01 (H) 0.86 - 1.14   Glucose by meter   Result Value Ref Range    Glucose 143 (H) 70 - 99 mg/dL   Glucose by meter   Result Value Ref Range    Glucose 228 (H) 70 - 99 mg/dL   Care Management / Social Work IP Consult    Narrative    Deb De Guzman RN     11/18/2020  2:59 PM  Care Management Initial Consult    General Information  Assessment completed with: Patient, Norah  Type of CM/SW Visit: Initial Assessment  Primary Care Provider verified and updated as needed: Yes   Readmission within the last 30 days: other (see comments)(yes)   Return Category: Progression of disease     Advance Care Planning: Advance Care Planning Reviewed: no   concerns identified          Communication Assessment  Patient's communication style:  spoken language (English or   Bilingual)    Hearing Difficulty or Deaf: no   Wear Glasses or Blind: yes    Cognitive  Cognitive/Neuro/Behavioral: WDL                      Living Environment:   People in home: other (see comments)(friend)     Current living Arrangements: house      Able to return to prior arrangements: yes       Family/Social Support:  Care provided by: self  Provides care for: no one  Marital Status: Single  Limited to;Other (specify)(friend-marian)          Description of Support System: Supportive    Support Assessment: Limited social contact and support    Current Resources:   Skilled Home Care Services:    Community Resources: County Worker;[facility]  Equipment currently used at home: none  Supplies currently used at home:      Employment/Financial:  Employment Status: disabled        Financial Concerns: No concerns identified   Referral to Financial Counselor: No       Lifestyle & Psychosocial Needs:     Social Needs     Financial resource strain: Not very hard     Food insecurity     Worry: Never true     Inability: Never true     Transportation needs     Medical: No     Non-medical: No     Socioeconomic History     Marital status:      Spouse name: Not on file     Number of children: Not on file     Years of education: Not on file     Highest education level: Not on file   Occupational History     Occupation: Homemaker     Tobacco Use     Smoking status: Current Every Day Smoker     Packs/day: 0.50     Years: 43.00     Pack years: 21.50     Types: Cigarettes     Smokeless tobacco: Never Used     Tobacco comment: started at age 11.  Quit during pregnancyX4.    Quit 3 months ago.    Substance and Sexual Activity     Alcohol use: No     Alcohol/week: 0.0 standard drinks     Comment: quit 1995     Drug use: No     Comment: past hx 22 years ago/ uppers downers, canibus     Sexual activity: Not Currently     Birth control/protection: Surgical       Functional Status:  Prior to  "admission patient needed assistance:   Dependent ADLs:: Independent  Dependent IADLs:: Independent  Assesssment of Functional Status: Needs assistance with   transportation;Needs assistive devices after discharge;Not at   baseline with mobility    Mental Health Status:  Mental Health Status: Past Concern  Mental Health Management:   Medication;Individual Therapy    Chemical Dependency Status:  Chemical Dependency Status: No Current Concerns             Values/Beliefs:  Spiritual, Cultural Beliefs, Scientologist Practices, Values that   affect care: no               Additional Information:  Spoke with patient via phone, introduced self and role. Patient   currently lives in a house in [place] with her friend,   Maurice.  She own the home, Maurice is staying with patient   temporarily.  She manages her own ADLs at baseline, but does NOT   drive.  Maurice helps with transportation needs, cleaning and   \"moral\" support.      Patient is current with below case workers:   SO[facility] Collins (Pt does not have phone #).   [facility] Care Coordination- [facility] Gertrude [place] [phone]    She is followed by Kd Morris at [facility] for bipolar disorder.      She was recently hospitalized at [facility] 11/8-11   with new heart failure.  She now suffered a fall at home,   describes \"my legs went rubbery\".  Discussed therapy   recommendations.  Discussed home care services.  Patient feels   she would benefit from home care services.  Patient chooses   [facility] ([phone] Fax: [phone]).  Order   placed for RN-for medication managment and PT.      Patient's friend, Maurice will provide transportation home upon   discharge.      Deb TRINIDADN RN  Inpatient Care Coordinator  [facility] [phone]  [facility] [phone]       HOME CARE HAND OFF  Patient " Name: Bia Bill    MRN: [id number]    : 1965    Patient Zip Code: [id number]    Admit Diagnosis: Hypoxia [R09.02]      Services Pt Needs at Home: RN and PT    Discharge Support: Family/Friend Support    Living Arrangements: Other friend     or Address Other Than Pt: No    Wound Care: No    Anticipate DC Date: 2020                   *Note: Due to a large number of results and/or encounters for the requested time period, some results have not been displayed. A complete set of results can be found in Results Review.           Attestation:  I have reviewed today's vital signs, notes, medications, labs and imaging.  Amount of time spent in direct patient care: 40 minutes.     Dre Engle MD, MD

## 2020-11-18 NOTE — PROGRESS NOTES
Clinic Care Coordination Contact  Ambulatory Care Coordination to Inpatient Care Management   Hand-In Communication    Date:  November 18, 2020  Name: Bia Bill is enrolled in Ambulatory Care Coordination program and I am the Lead Care Coordinator.  CC Contact Information:  care coordination  [phone]    Payor Source: Payor: [facility] / Plan: [employer] MA ONLY / Product Type: HMO /   Current services in place:     Please see the CC Snaphot and Care Management Flowsheets for specific  details of this Bia Bill care plan.   Additional details/specific concerns r/t this admission:    Lack of Support System has SO that smokes in the home, Caregiver Concerns SO is not a caregiver and relies on patient  and Readmission Risk due to lack of follow through. She would benefit from home care for help with medication set up and education    I will follow this admission in Epic. Please feel free to contact me with questions or for further collaboration in discharge planning.    Michela Whitaker RN, [facility] RN Coordinator  [facility]   11/18/2020    8:03 AM  [phone]

## 2020-11-18 NOTE — PROGRESS NOTES
11/18/20 1000   Quick Adds   Type of Visit Initial PT Evaluation   Living Environment   People in home friend(s)   Current Living Arrangements mobile home   Home Accessibility no concerns   Disability/Function   Hearing Difficulty or Deaf no   Wear Glasses or Blind yes   Concentrating, Remembering or Making Decisions Difficulty no   Difficulty Communicating no   Difficulty Eating/Swallowing no   Walking or Climbing Stairs Difficulty other (see comments)  (PLOF- Indep. ambulation community distances, no device)   Dressing/Bathing Difficulty no   Toileting no   Doing Errands Independently Difficulty (such as shopping) no   Fall history within last six months yes   Number of times patient has fallen within last six months   (1- see below)   Change in Functional Status Since Onset of Current Illness/Injury yes   General Information   Onset of Illness/Injury or Date of Surgery 11/17/20   Referring Physician Natty   Patient/Family Therapy Goals Statement (PT) return  home   Pertinent History of Current Problem (include personal factors and/or comorbidities that impact the POC) 54 year old female admitted on 11/17/2020. She presents after having some lower extremity weakness and a fall and is noticing more dyspnea on exertion.- admitted w/ Acute cor pulmonale;, Obesity hypoventilation syndrome, Generalized weakness,Musculoskeletal soft tissue injury, Became weak in lower extremities and fell against the bed. Nonfocal neurologic exam. No clear etiology for the weakness. Right low back pain. Does not seem severe.per H and P   Existing Precautions/Restrictions oxygen therapy device and L/min   General Observations Pt up in chair - lethargic initially  ocas  falling asleep .  P ain lower Lumbar area- R>L. ; reports RLE pain ( non dermatomal )    Pain Assessment   Patient Currently in Pain Yes, see Vital Sign flowsheet   Integumentary/Edema   Integumentary/Edema Comments Area of bruising where pt landed on her bed    Range  of Motion (ROM)   ROM Comment WFL. LAROM- Ext- pain free; 50% loss of flex w/ end randge pain.  Negative slump test    Strength   Strength Comments Appears intact 4/5 throughout LE muscle groups- pain w/ MMT on R    Bed Mobility   Comment (Bed Mobility) Nt- pt declines lying in bed- reports SOA if flat/ supine in bed    Transfers   Transfer Safety Comments SBA sit > stand w. RW    Gait/Stairs (Locomotion)   Comment (Gait/Stairs) Pt amb 70 feet x1 wiht RW. SBA.  - 3L  O2.  Encourged pt to amb 3x/ daily in prep. for discharge. Pt requesting a RW for home use( and homeO2)    Balance   Balance Comments good dynamic standing balance   Sensory Examination   Sensory Perception patient reports no sensory changes   Clinical Impression   Criteria for Skilled Therapeutic Intervention yes, treatment indicated   PT Diagnosis (PT) Lumbar dysfunction  (Musculoskeletal )    Influenced by the following impairments Pain   Functional limitations due to impairments Decreased amb status    Clinical Presentation Stable/Uncomplicated   Clinical Presentation Rationale clinical judgement   Clinical Decision Making (Complexity) low complexity   Therapy Frequency (PT) Daily   Planned Therapy Interventions (PT) home exercise program;gait training   Risk & Benefits of therapy have been explained risks/benefits reviewed;patient   PT Discharge Planning    PT Discharge Recommendation (DC Rec) home with assist   PT Rationale for DC Rec   (Pt completed mobility w/ SBA )   PT Brief overview of current status  Eval completed- AMb  70 feet x1 with RW. SBA.   Reports high pain. pain present but appears controlled. No pain behaviors.  Also weak./ generalized weakness

## 2020-11-18 NOTE — H&P
[facility]     History and Physical - Hospitalist Service       Date of Admission:  11/17/2020    Assessment & Plan   Bia Bill is a 54 year old female admitted on 11/17/2020. She presents after having some lower extremity weakness and a fall and is noticing more dyspnea on exertion.    Acute cor pulmonale    H/o recent right heart failure admission. Weight up 9# from recent discharge, BNP increased, increased dyspnea on exertion, weakness.    - furosemide 40 mg IV q 8 hours   - O2 as needed    GILES  Obesity hypoventilation syndrome  Chronic respiratory failure with hypercapnia    Patient was set up with home BiPAP in the ED today.    - New home BiPAP overnight    Mild persistent asthma    No wheezes.    - continue home Advair    Generalized weakness  Musculoskeletal soft tissue injury    Became weak in lower extremities and fell against the bed. Nonfocal neurologic exam. No clear etiology for the weakness. Right low back pain. Does not seem severe.    - follow for recurrence   - physical therapy eval    Bipolar disorder    Appears compensated.    - continue Abilify, buproprion    Diabetes mellitus type 2    Well controlled on metformin.   - continue metformin    Restless legs syndrome   - continue mirapex    Erythrocytosis, history of    Followed by Dr. Watts. H/o phlebotomy. Hgb 10.8 on admission. Stable.     Leucocytosis    On admission, WBC 15.0. New from 11/13. No fever. Suspect demargination.    - follow for improvement or other signs of infection    Chronic thrombocytopenia    Mild. platelets 118k    H/o DVT, positive Lupus anticoagulant    On chronic warfarin   - continue warfarin per pharmacist    COVID-19 status - pending    Asymptomatic screen per admission policy.      Diet: Consistent Carbohydrate Diet 2436-9050 Calories: Moderate Consistent CHO (4-6 CHO units/meal)    DVT Prophylaxis: Warfarin  Goldsmith Catheter: not present  Code Status:   Full         Disposition  Plan   Expected discharge: 2 - 3 days, recommended to prior living arrangement once adequate diuresis and improvement in hypoxemia and dyspnea on exertion.  Entered: Aaron Luz MD 11/17/2020, 7:37 PM     The patient's care was discussed with the Patient.    Aaron Luz MD  [facility]       ______________________________________________________________________    Chief Complaint   Episode of lower extremity weakness and also increased dyspnea on exertion    History is obtained from the patient who is a fair historian    History of Present Illness   Bia Bill is a 54 year old female who was just recently admitted for right heart failure and discharged 4 days prior to this presentation now presents after an episode of weakness where she fell and also notices more dyspnea on exertion.  She had been feeling okay after discharge until the morning of admission.  She got out of bed suddenly felt weak in the legs and felt back against the bed hitting the bed frame with her right low back.  She did not lose consciousness.  A friend was there to try to catch her but was unable to.  She may have felt a little lightheaded but mostly she felt weak in the lower extremities bilaterally.  Is been generally feeling a little more weak all day since then.  She also notices more dyspnea on exertion but usual shortness of breath at rest.  No chest pain.  No cough, myalgias, fever, sore throat, loss of taste or smell or known COVID exposures since her discharge.  Planes of feeling more bloated in the abdomen which also pushes up on her diaphragms making it harder to breathe.  She did not have her usual bowel movement on the morning of her presentation.    Review of Systems    The 10 point Review of Systems is negative other than noted in the HPI or here.  No other pertinent findings    Past Medical History    I have reviewed this patient's medical history and updated it with pertinent  information if needed.   Past Medical History:   Diagnosis Date     Acromioclavicular joint arthritis 01/25/2017     Acute bronchospasm 08/15/2016     Acute gouty arthritis 08/04/2014     Anxiety state, unspecified      Bipolar I disorder, most recent episode (or current) unspecified      Closed fracture of metatarsal bone 06/05/2007     COPD exacerbation (H) 10/29/2019     Depressive disorder, not elsewhere classified      DVT, lower extremity (H)      Headache 10/17/2014     History of stroke symptoms, stroke ruled out on imaging 2011    Admissions in 2011 and 2014 for stroke-like symptoms. Evaluated by neurology on both occasions, imaging not consistent with stroke. Thought to be due to conversion disorder in 2011.     Impingement syndrome of shoulder region 01/25/2017     Polycythemia, secondary     [place] HGB      Right shoulder pain 10/14/2014     Trochanteric bursitis of both hips 01/11/2016       Past Surgical History   I have reviewed this patient's surgical history and updated it with pertinent information if needed.  Past Surgical History:   Procedure Laterality Date     BONE MARROW BIOPSY, BONE SPECIMEN, NEEDLE/TROCAR N/A 11/17/2014    Procedure: BIOPSY BONE MARROW;  Surgeon: Hay Aaron MD;  Location: [facility]     D & C  10/26/2009    with uterine ablation     ENDOVASCULAR PLACEMENT VASCULAR DEVICE Left 2004    Left leg venous stent x 3     ESOPHAGOSCOPY, GASTROSCOPY, DUODENOSCOPY (EGD), COMBINED  04/21/2014    Procedure: Gastroscopy;  Surgeon: Moris Thomas MD;  Location: [facility]     HYSTERECTOMY, PAP NO LONGER INDICATED  01/04/2010     LAPAROSCOPIC CHOLECYSTECTOMY N/A 01/04/2019    Procedure: Laparoscopic cholecystectomy;  Surgeon: Aaron Siegel MD;  Location: [facility]     LITHOTRIPSY  2004    Lithotrypsy       Social History   I have reviewed this patient's social history and updated it with pertinent information if needed.  Social History     Tobacco Use     Smoking status: Current  Every Day Smoker     Packs/day: 0.50     Years: 43.00     Pack years: 21.50     Types: Cigarettes     Smokeless tobacco: Never Used     Tobacco comment: started at age 11.  Quit during pregnancyX4.  Quit 3 months ago.    Substance Use Topics     Alcohol use: No     Alcohol/week: 0.0 standard drinks     Comment: quit 1995     Drug use: No     Comment: past hx 22 years ago/ uppers downers, canibus       Family History   I have reviewed this patient's family history and updated it with pertinent information if needed.  Family History   Problem Relation Age of Onset     Hypertension Mother      Diabetes Mother      Blood Disease Mother      Heart Disease Mother         chf     Cerebrovascular Disease Mother      Hypertension Father      Cancer Father         lung cancer     Allergies Sister      Diabetes Sister      Depression Sister      Hypertension Sister        Prior to Admission Medications   Prior to Admission Medications   Prescriptions Last Dose Informant Patient Reported? Taking?   ADVAIR -21 MCG/ACT inhaler 11/16/2020 at Unknown time Self No Yes   Sig: INHALE TWO PUFFS BY MOUTH TWICE A DAY   Patient taking differently: Inhale 2 puffs into the lungs 2 times daily *Do not use more frequently than twice daily.*  Rinse mouth after use.   ARIPiprazole (ABILIFY) 10 MG tablet 11/16/2020 at Unknown time Self No Yes   Sig: TAKE ONE TABLET BY MOUTH ONCE DAILY   Patient taking differently: Take 10 mg by mouth daily    EPINEPHrine (ANY BX GENERIC EQUIV) 0.3 MG/0.3ML injection 2-pack  Self No No   Sig: Inject 0.3 mLs (0.3 mg) into the muscle once as needed for anaphylaxis   VITAMIN D3 25 MCG (1000 UT) tablet 11/16/2020 at Unknown time Self No Yes   Sig: TAKE ONE TABLET BY MOUTH ONCE DAILY   Patient taking differently: Take 1 tablet by mouth daily    acetaminophen (TYLENOL) 500 MG tablet Unknown at Unknown time Self Yes No   Sig: Take 500-1,000 mg by mouth every 6 hours as needed for mild pain   albuterol (PROAIR  HFA/PROVENTIL HFA/VENTOLIN HFA) 108 (90 Base) MCG/ACT inhaler 11/16/2020 at Unknown time Self No Yes   Sig: INHALE TWO PUFFS BY MOUTH EVERY 6 HOURS AS NEEDED FOR SHORTNESS OF BREATH DIFFICULTY BREATHING OR WHEEZING   Patient taking differently: Inhale 2 puffs into the lungs every 6 hours as needed for shortness of breath / dyspnea or wheezing    albuterol (PROVENTIL) (2.5 MG/3ML) 0.083% neb solution 11/16/2020 at Unknown time Self No Yes   Sig: Take 1 vial (2.5 mg) by nebulization every 6 hours as needed for shortness of breath / dyspnea or wheezing   alcohol swab prep pads  Self No No   Sig: Use to swab area of injection/mike as directed.   allopurinol (ZYLOPRIM) 300 MG tablet 11/16/2020 at Unknown time Self No Yes   Sig: TAKE ONE TABLET BY MOUTH ONCE DAILY   Patient taking differently: Take 300 mg by mouth daily    blood glucose (NO BRAND SPECIFIED) test strip  Self No No   Sig: Use to test blood sugar 2-3x/day. To accompany: Blood Glucose Monitor Brands: per insurance.   blood glucose calibration (NO BRAND SPECIFIED) solution  Self No No   Sig: To accompany: Blood Glucose Monitor Brands: per insurance.   blood glucose monitoring (NO BRAND SPECIFIED) meter device kit  Self No No   Sig: Use to test blood sugar daily or as directed. Preferred blood glucose meter OR supplies to accompany: Blood Glucose Monitor Brands: per insurance.   buPROPion (WELLBUTRIN XL) 150 MG 24 hr tablet 11/16/2020 at Unknown time  No Yes   Sig: TAKE ONE TABLET BY MOUTH EVERY EVENING   furosemide (LASIX) 40 MG tablet 11/16/2020 at Unknown time Self No Yes   Sig: Take 1 tablet (40 mg) by mouth 2 times daily   gabapentin (NEURONTIN) 300 MG capsule 11/16/2020 at Unknown time Self No Yes   Sig: Take 1 capsule (300 mg) by mouth At Bedtime TAKE ONE CAPSULE BY MOUTH ONCE DAILY AT BEDTIME WITH A 600 MG TABLET FOR A TOTAL DOSE  MG   gabapentin (NEURONTIN) 600 MG tablet 11/16/2020 at Unknown time Self No Yes   Sig: TAKE ONE TABLET BY MOUTH  THREE TIMES A DAY   Patient taking differently: Take 600 mg by mouth 3 times daily TAKE ONE TABLET BY MOUTH THREE TIMES A DAY   ipratropium - albuterol 0.5 mg/2.5 mg/3 mL (DUONEB) 0.5-2.5 (3) MG/3ML neb solution  Self No No   Sig: Take 1 vial (3 mLs) by nebulization every 6 hours as needed for shortness of breath / dyspnea or wheezing   lamoTRIgine (LAMICTAL) 100 MG tablet 11/16/2020 at Unknown time Self No Yes   Sig: TAKE ONE TABLET BY MOUTH ONCE DAILY   Patient taking differently: Take 100 mg by mouth daily    levothyroxine (SYNTHROID/LEVOTHROID) 175 MCG tablet 11/16/2020 at Unknown time Self No Yes   Sig: TAKE ONE TABLET BY MOUTH ONCE DAILY   Patient taking differently: Take 175 mcg by mouth daily Separate oral administration of iron- or calcium-containing products and levothyroxine by at least 4 hours.   mometasone-formoterol (DULERA) 200-5 MCG/ACT oral inhaler  Self No No   Sig: Inhale 2 puffs into the lungs 2 times daily   omeprazole (PRILOSEC) 20 MG DR capsule 11/16/2020 at Unknown time Self No Yes   Sig: Take 1 capsule (20 mg) by mouth 2 times daily   order for DME  Self No No   Sig: Equipment being ordered: INCONTINENCE PADS   QID   order for DME  Self No No   Sig: Equipment being ordered: DEPENDS SIZE LARGE   order for DME  Self Yes No   Sig: Equipment being ordered: CPAP  AIRSENSE 10  5-18 CM H20  SN# [id number]   DN# [id number]   order for DME  Self No No   Sig: Equipment being ordered: Nebulizer   order for DME  Self No No   Sig: Equipment being ordered:x2 [employer] 30/40mmHg compression wraps with x2 extra prs of compression liners   potassium chloride ER (KLOR-CON M) 10 MEQ CR tablet  Self No No   Sig: TAKE ONE TABLET BY MOUTH ONCE DAILY   Patient taking differently: Take 10 mEq by mouth daily    pramipexole (MIRAPEX) 0.125 MG tablet 11/16/2020 at Unknown time Self No Yes   Sig: Take 1-20 tablets (0.125-2.5 mg) by mouth At Bedtime TAKE 1-2 TABLETS BY MOUTH AT BEDTIME   simvastatin (ZOCOR) 20 MG tablet  11/16/2020 at Unknown time Self No Yes   Sig: TAKE ONE TABLET BY MOUTH EVERY NIGHT AT BEDTIME   Patient taking differently: Take 20 mg by mouth At Bedtime    thin (NO BRAND SPECIFIED) lancets  Self No No   Sig: Use with lanceting device. To accompany: Blood Glucose Monitor Brands: per insurance.   traZODone (DESYREL) 150 MG tablet 11/16/2020 at Unknown time Self No Yes   Sig: TAKE ONE TABLET BY MOUTH EVERY NIGHT AT BEDTIME   Patient taking differently: Take 150 mg by mouth At Bedtime    trimethoprim-polymyxin b (POLYTRIM) 00288-1.1 UNIT/ML-% ophthalmic solution  Self No No   Sig: Place 1-2 drops Into the left eye every 6 hours   warfarin ANTICOAGULANT (JANTOVEN ANTICOAGULANT) 5 MG tablet 11/16/2020 at Unknown time Self No Yes   Sig: TAKE TWO AND ONE-HALF TABLETS BY MOUTH EVERY MONDAY, TWO TABLETS ON ALL OTHER DAYS OR AS DIRECTED BY ANTICOAGULATION CLINIC      Facility-Administered Medications: None     Allergies   Allergies   Allergen Reactions     Darvocet [Propoxyphene N-Apap] Anaphylaxis     Darvocet,Percocet      Percocet [Oxycodone-Acetaminophen] Anaphylaxis, Hives and Swelling     Has tolerated hydromorphone in the past.      Asa [Aspirin] Hives     Aspirin causes seizures and hives, throat swelling.   Has tolerated ketorolac in the past.        Bee      Ibuprofen Swelling     Throat swelling per patient      Lyrica [Pregabalin] Dizziness and Swelling     Povidone Iodine Rash     Reaction to topical betadine     Tape [Adhesive Tape] Rash       Physical Exam   Vital Signs: Temp: 98.4  F (36.9  C) Temp src: Oral BP: 127/50 Pulse: 82   Resp: 20 SpO2: 92 % O2 Device: Nasal cannula Oxygen Delivery: 1 LPM  Weight: 255 lbs 11.74 oz    Constitutional: Alert, oriented, cooperative, no apparent distress at rest, appears nontoxic, mood normal.   Eyes: Eyes are clear, pupils are reactive.  HEENT: Face mask in place.  No evidence of cranial trauma.  Lymph/Hematologic: No epitrochlear, axillary, anterior or posterior  cervical, or supraclavicular lymphadenopathy is appreciated.  Cardiovascular: Regular rate and rhythm, normal S1 and S2. There is a high-pitched 3 out of 6 hemisystolic murmur heard at the left lower sternal border. JVP is difficult to ascertain given body habitus. Good peripheral pulses in wrists bilaterally.  There is 3+ bilateral lower extremity edema.  There is also edema in the left flank and side of abdomen.  Patient notes that she generally sleeps on that left side.  Respiratory: Diminished breath sounds in the bases, no crackles or wheezes  GI: Soft, mild to moderate distention, non-tender, bowel sounds present but seem hypoactive.  Genitourinary: Deferred  Musculoskeletal: Normal muscle bulk and tone.  Skin: Warm and dry, no rashes.   Neurologic: Neck supple. Cranial nerves are grossly intact.  is symmetric.  Hip flexion knee extension and dorsiflexion are all very strong on exam.  Deep tendon flexes are absent in the knees bilaterally.    Data   Data reviewed today: I reviewed all medications, new labs and imaging results over the last 24 hours. I personally reviewed the chest x-ray image(s) showing Clear lungs with no effusions.    Recent Labs   Lab 11/17/20  0948 11/13/20  0936 11/11/20  0609   WBC 15.0* 5.5  --    HGB 10.8* 11.3*  --    MCV 83 84  --    * 120*  --    INR 2.37* 1.79* 1.36*     --  141   POTASSIUM 4.0  --  3.9   CHLORIDE 102  --  102   CO2 33*  --  38*   BUN 19  --  17   CR 0.86  --  0.75   ANIONGAP 2*  --  1*   HOOD 8.7  --  9.7   *  --  118*   ALBUMIN 3.3*  --   --    PROTTOTAL 6.3*  --   --    BILITOTAL 0.9  --   --    ALKPHOS 102  --   --    ALT 26  --   --    AST 16  --   --

## 2020-11-18 NOTE — CONSULTS
Care Management Initial Consult    General Information  Assessment completed with: Norah Alberto  Type of CM/SW Visit: Initial Assessment  Primary Care Provider verified and updated as needed: Yes   Readmission within the last 30 days: other (see comments)(yes)   Return Category: Progression of disease     Advance Care Planning: Advance Care Planning Reviewed: no concerns identified          Communication Assessment  Patient's communication style: spoken language (English or Bilingual)    Hearing Difficulty or Deaf: no   Wear Glasses or Blind: yes    Cognitive  Cognitive/Neuro/Behavioral: WDL                      Living Environment:   People in home: other (see comments)(friend)     Current living Arrangements: house      Able to return to prior arrangements: yes       Family/Social Support:  Care provided by: self  Provides care for: no one  Marital Status: Single  Limited to;Other (specify)(friend-marian)          Description of Support System: Supportive    Support Assessment: Limited social contact and support    Current Resources:   Skilled Home Care Services:    Community Resources: [facility]  Equipment currently used at home: none  Supplies currently used at home:      Employment/Financial:  Employment Status: disabled        Financial Concerns: No concerns identified   Referral to Financial Counselor: No       Lifestyle & Psychosocial Needs:     Social Needs     Financial resource strain: Not very hard     Food insecurity     Worry: Never true     Inability: Never true     Transportation needs     Medical: No     Non-medical: No     Socioeconomic History     Marital status:      Spouse name: Not on file     Number of children: Not on file     Years of education: Not on file     Highest education level: Not on file   Occupational History     Occupation: Homemaker     Tobacco Use     Smoking status: Current Every Day Smoker     Packs/day: 0.50     Years: 43.00     Pack years:  "21.50     Types: Cigarettes     Smokeless tobacco: Never Used     Tobacco comment: started at age 11.  Quit during pregnancyX4.  Quit 3 months ago.    Substance and Sexual Activity     Alcohol use: No     Alcohol/week: 0.0 standard drinks     Comment: quit 1995     Drug use: No     Comment: past hx 22 years ago/ uppers downers, canibus     Sexual activity: Not Currently     Birth control/protection: Surgical       Functional Status:  Prior to admission patient needed assistance:   Dependent ADLs:: Independent  Dependent IADLs:: Independent  Assesssment of Functional Status: Needs assistance with transportation;Needs assistive devices after discharge;Not at baseline with mobility    Mental Health Status:  Mental Health Status: Past Concern  Mental Health Management: Medication;Individual Therapy    Chemical Dependency Status:  Chemical Dependency Status: No Current Concerns             Values/Beliefs:  Spiritual, Cultural Beliefs, Protestant Practices, Values that affect care: no               Additional Information:  Spoke with patient via phone, introduced self and role. Patient currently lives in a house in [place] with her friend, Maurice.  She own the home, Maurice is staying with patient temporarily.  She manages her own ADLs at baseline, but does NOT drive.  Maurice helps with transportation needs, cleaning and \"moral\" support.      Patient is current with below case workers:   [facility]TAK[facility] - Amol Collins (Pt does not have phone #).   [facility] Care Coordination- [facility] [phone]    She is followed by Kd Morris at [facility] for bipolar disorder.      She was recently hospitalized at [facility] 11/8-11 with new heart failure.  She now suffered a fall at home, describes \"my legs went rubbery\".  Discussed therapy recommendations.  Discussed home care services.  Patient feels she would benefit from home " care services.  Patient chooses [facility] ([phone] Fax: [phone]).  Order placed for RN-for medication managment and PT.      Patient's friend, Maurice will provide transportation home upon discharge.      Deb TRINIDADN RN  Inpatient Care Coordinator  [facility] [phone]  [facility] [phone]       HOME CARE HAND OFF  Patient Name: Bia Bill    MRN: [id number]    : 1965    Patient Zip Code: [address]    Admit Diagnosis: Hypoxia [R09.02]      Services Pt Needs at Home: RN and PT    Discharge Support: Family/Friend Support    Living Arrangements: Other friend     or Address Other Than Pt: No    Wound Care: No    Anticipate DC Date: 2020

## 2020-11-18 NOTE — PLAN OF CARE
"Pt here for increased edema, shortness of breath. Diuresing with IV Lasix. Pt up with SBA due to recent fall. Bed alarm on for safety. Blood pressure 106/67, pulse 76, temperature 98  F (36.7  C), temperature source Oral, resp. rate 18, height 1.473 m (4' 10\"), weight 111.7 kg (246 lb 4.1 oz), last menstrual period 09/27/2009, SpO2 97 %, not currently breastfeeding. Denies pain at rest. Resting appropriately. Currently on 4 L of supplemental oxygen with pulse oximetry. Continue to assess per POC.    "

## 2020-11-19 ENCOUNTER — DOCUMENTATION ONLY (OUTPATIENT)
Dept: FAMILY MEDICINE | Facility: CLINIC | Age: 55
End: 2020-11-19

## 2020-11-19 VITALS
SYSTOLIC BLOOD PRESSURE: 137 MMHG | OXYGEN SATURATION: 93 % | BODY MASS INDEX: 51.65 KG/M2 | DIASTOLIC BLOOD PRESSURE: 55 MMHG | RESPIRATION RATE: 16 BRPM | HEIGHT: 58 IN | TEMPERATURE: 97.2 F | WEIGHT: 246.03 LBS | HEART RATE: 68 BPM

## 2020-11-19 DIAGNOSIS — Z86.718 HISTORY OF DEEP VENOUS THROMBOSIS: ICD-10-CM

## 2020-11-19 LAB
ANION GAP SERPL CALCULATED.3IONS-SCNC: 4 MMOL/L (ref 3–14)
BASE EXCESS BLDV CALC-SCNC: 6.6 MMOL/L
BUN SERPL-MCNC: 36 MG/DL (ref 7–30)
CALCIUM SERPL-MCNC: 9 MG/DL (ref 8.5–10.1)
CHLORIDE SERPL-SCNC: 100 MMOL/L (ref 94–109)
CO2 SERPL-SCNC: 29 MMOL/L (ref 20–32)
CREAT SERPL-MCNC: 1.08 MG/DL (ref 0.52–1.04)
ERYTHROCYTE [DISTWIDTH] IN BLOOD BY AUTOMATED COUNT: 21.2 % (ref 10–15)
GFR SERPL CREATININE-BSD FRML MDRD: 58 ML/MIN/{1.73_M2}
GLUCOSE BLDC GLUCOMTR-MCNC: 151 MG/DL (ref 70–99)
GLUCOSE BLDC GLUCOMTR-MCNC: 159 MG/DL (ref 70–99)
GLUCOSE BLDC GLUCOMTR-MCNC: 194 MG/DL (ref 70–99)
GLUCOSE SERPL-MCNC: 130 MG/DL (ref 70–99)
HCO3 BLDV-SCNC: 32 MMOL/L (ref 21–28)
HCT VFR BLD AUTO: 36.2 % (ref 35–47)
HGB BLD-MCNC: 10 G/DL (ref 11.7–15.7)
INR PPP: 2.3 (ref 0.86–1.14)
MCH RBC QN AUTO: 23.3 PG (ref 26.5–33)
MCHC RBC AUTO-ENTMCNC: 27.6 G/DL (ref 31.5–36.5)
MCV RBC AUTO: 84 FL (ref 78–100)
O2/TOTAL GAS SETTING VFR VENT: ABNORMAL %
PCO2 BLDV: 47 MM HG (ref 40–50)
PH BLDV: 7.44 PH (ref 7.32–7.43)
PLATELET # BLD AUTO: 115 10E9/L (ref 150–450)
PO2 BLDV: 49 MM HG (ref 25–47)
POTASSIUM SERPL-SCNC: 4.5 MMOL/L (ref 3.4–5.3)
RBC # BLD AUTO: 4.29 10E12/L (ref 3.8–5.2)
SODIUM SERPL-SCNC: 133 MMOL/L (ref 133–144)
WBC # BLD AUTO: 8 10E9/L (ref 4–11)

## 2020-11-19 PROCEDURE — 250N000013 HC RX MED GY IP 250 OP 250 PS 637: Performed by: FAMILY MEDICINE

## 2020-11-19 PROCEDURE — 999N001017 HC STATISTIC GLUCOSE BY METER IP

## 2020-11-19 PROCEDURE — 36415 COLL VENOUS BLD VENIPUNCTURE: CPT | Performed by: INTERNAL MEDICINE

## 2020-11-19 PROCEDURE — 80048 BASIC METABOLIC PNL TOTAL CA: CPT | Performed by: INTERNAL MEDICINE

## 2020-11-19 PROCEDURE — 99239 HOSP IP/OBS DSCHRG MGMT >30: CPT | Performed by: FAMILY MEDICINE

## 2020-11-19 PROCEDURE — 82803 BLOOD GASES ANY COMBINATION: CPT | Performed by: FAMILY MEDICINE

## 2020-11-19 PROCEDURE — G0008 ADMIN INFLUENZA VIRUS VAC: HCPCS | Performed by: FAMILY MEDICINE

## 2020-11-19 PROCEDURE — 250N000011 HC RX IP 250 OP 636: Performed by: FAMILY MEDICINE

## 2020-11-19 PROCEDURE — 85610 PROTHROMBIN TIME: CPT | Performed by: INTERNAL MEDICINE

## 2020-11-19 PROCEDURE — 250N000013 HC RX MED GY IP 250 OP 250 PS 637: Performed by: INTERNAL MEDICINE

## 2020-11-19 PROCEDURE — 85027 COMPLETE CBC AUTOMATED: CPT | Performed by: INTERNAL MEDICINE

## 2020-11-19 PROCEDURE — 90682 RIV4 VACC RECOMBINANT DNA IM: CPT | Performed by: FAMILY MEDICINE

## 2020-11-19 RX ORDER — FUROSEMIDE 40 MG
TABLET ORAL
Qty: 180 TABLET | Refills: 4 | COMMUNITY
Start: 2020-11-19 | End: 2021-02-04

## 2020-11-19 RX ORDER — WARFARIN SODIUM 5 MG/1
10 TABLET ORAL
Status: DISCONTINUED | OUTPATIENT
Start: 2020-11-19 | End: 2020-11-19 | Stop reason: HOSPADM

## 2020-11-19 RX ADMIN — POTASSIUM CHLORIDE 10 MEQ: 750 TABLET, FILM COATED, EXTENDED RELEASE ORAL at 08:53

## 2020-11-19 RX ADMIN — INFLUENZA A VIRUS A/HAWAII/70/2019 (H1N1) RECOMBINANT HEMAGGLUTININ ANTIGEN, INFLUENZA A VIRUS A/MINNESOTA/41/2019 (H3N2) RECOMBINANT HEMAGGLUTININ ANTIGEN, INFLUENZA B VIRUS B/WASHINGTON/02/2019 RECOMBINANT HEMAGGLUTININ ANTIGEN, AND INFLUENZA B VIRUS B/PHUKET/3073/2013 RECOMBINANT HEMAGGLUTININ ANTIGEN 0.5 ML: 45; 45; 45; 45 INJECTION INTRAMUSCULAR at 13:52

## 2020-11-19 RX ADMIN — GABAPENTIN 600 MG: 600 TABLET, FILM COATED ORAL at 08:52

## 2020-11-19 RX ADMIN — LEVOTHYROXINE SODIUM 175 MCG: 0.15 TABLET ORAL at 06:15

## 2020-11-19 RX ADMIN — OMEPRAZOLE 20 MG: 20 CAPSULE, DELAYED RELEASE ORAL at 08:53

## 2020-11-19 RX ADMIN — ARIPIPRAZOLE 10 MG: 10 TABLET ORAL at 08:56

## 2020-11-19 RX ADMIN — INSULIN ASPART 1 UNITS: 100 INJECTION, SOLUTION INTRAVENOUS; SUBCUTANEOUS at 12:38

## 2020-11-19 RX ADMIN — ALLOPURINOL 300 MG: 300 TABLET ORAL at 08:53

## 2020-11-19 RX ADMIN — ACETAMINOPHEN 1000 MG: 500 TABLET, FILM COATED ORAL at 02:26

## 2020-11-19 RX ADMIN — LAMOTRIGINE 100 MG: 100 TABLET ORAL at 08:56

## 2020-11-19 RX ADMIN — INSULIN ASPART 1 UNITS: 100 INJECTION, SOLUTION INTRAVENOUS; SUBCUTANEOUS at 08:58

## 2020-11-19 RX ADMIN — ACETAMINOPHEN 1000 MG: 500 TABLET, FILM COATED ORAL at 08:56

## 2020-11-19 RX ADMIN — FUROSEMIDE 40 MG: 10 INJECTION, SOLUTION INTRAMUSCULAR; INTRAVENOUS at 08:57

## 2020-11-19 RX ADMIN — FLUTICASONE FUROATE AND VILANTEROL TRIFENATATE 1 PUFF: 200; 25 POWDER RESPIRATORY (INHALATION) at 08:53

## 2020-11-19 ASSESSMENT — MIFFLIN-ST. JEOR: SCORE: 1610.75

## 2020-11-19 ASSESSMENT — ACTIVITIES OF DAILY LIVING (ADL)
ADLS_ACUITY_SCORE: 15

## 2020-11-19 NOTE — PLAN OF CARE
Physical Therapy Discharge Summary    Reason for therapy discharge:    Discharged to home.    Progress towards therapy goal(s). See goals on Care Plan in [facility] electronic health record for goal details.  Goals met/  adequate for discharge    Therapy recommendation(s):    Continued therapy is recommended.  Rationale/Recommendations:  HC PT due to generalized weakness/ LBP.

## 2020-11-19 NOTE — DISCHARGE SUMMARY
[facility] Discharge Summary    Bia Bill MRN# [id number]   Age: 54 year old YOB: 1965     Date of Admission:  11/17/2020  Date of Discharge::  11/19/2020  Admitting Physician:  Aaron Luz MD  Discharge Physician:  Dre Engle MD, MD             Admission Diagnoses:   Hypoxia [R09.02]          Principle Discharge Diagnosis:       Acute cor pulmonale/acute exacerbation of right sided and diastolic heart failure     See hospital course for further active diagnoses addressed during this admission.            Procedures:   No procedures performed during this admission          Medications Prior to Admission:     Medications Prior to Admission   Medication Sig Dispense Refill Last Dose     ADVAIR -21 MCG/ACT inhaler INHALE TWO PUFFS BY MOUTH TWICE A DAY (Patient taking differently: Inhale 2 puffs into the lungs 2 times daily *Do not use more frequently than twice daily.*  Rinse mouth after use.) 12 g 3 11/16/2020 at Unknown time     albuterol (PROAIR HFA/PROVENTIL HFA/VENTOLIN HFA) 108 (90 Base) MCG/ACT inhaler INHALE TWO PUFFS BY MOUTH EVERY 6 HOURS AS NEEDED FOR SHORTNESS OF BREATH DIFFICULTY BREATHING OR WHEEZING (Patient taking differently: Inhale 2 puffs into the lungs every 6 hours as needed for shortness of breath / dyspnea or wheezing ) 18 g 0 11/16/2020 at Unknown time     albuterol (PROVENTIL) (2.5 MG/3ML) 0.083% neb solution Take 1 vial (2.5 mg) by nebulization every 6 hours as needed for shortness of breath / dyspnea or wheezing 1 Box 0 11/16/2020 at Unknown time     allopurinol (ZYLOPRIM) 300 MG tablet TAKE ONE TABLET BY MOUTH ONCE DAILY (Patient taking differently: Take 300 mg by mouth daily ) 90 tablet 1 11/16/2020 at am     ARIPiprazole (ABILIFY) 10 MG tablet TAKE ONE TABLET BY MOUTH ONCE DAILY (Patient taking differently: Take 10 mg by mouth daily ) 90 tablet 1 11/16/2020 at Unknown time     buPROPion (WELLBUTRIN XL) 150 MG 24 hr tablet TAKE ONE TABLET  BY MOUTH EVERY EVENING 90 tablet 0 11/16/2020 at pm     furosemide (LASIX) 40 MG tablet Take 1 tablet (40 mg) by mouth 2 times daily 180 tablet 4 11/16/2020 at Unknown time     gabapentin (NEURONTIN) 300 MG capsule Take 1 capsule (300 mg) by mouth At Bedtime TAKE ONE CAPSULE BY MOUTH ONCE DAILY AT BEDTIME WITH A 600 MG TABLET FOR A TOTAL DOSE  MG 90 capsule 3 11/16/2020 at hs     gabapentin (NEURONTIN) 600 MG tablet TAKE ONE TABLET BY MOUTH THREE TIMES A DAY (Patient taking differently: Take 600 mg by mouth 3 times daily TAKE ONE TABLET BY MOUTH THREE TIMES A DAY) 90 tablet 3 11/16/2020 at pm     ipratropium - albuterol 0.5 mg/2.5 mg/3 mL (DUONEB) 0.5-2.5 (3) MG/3ML neb solution Take 1 vial (3 mLs) by nebulization every 6 hours as needed for shortness of breath / dyspnea or wheezing 30 vial 11      lamoTRIgine (LAMICTAL) 100 MG tablet TAKE ONE TABLET BY MOUTH ONCE DAILY (Patient taking differently: Take 100 mg by mouth every evening ) 90 tablet 1 11/16/2020 at pm     levothyroxine (SYNTHROID/LEVOTHROID) 175 MCG tablet TAKE ONE TABLET BY MOUTH ONCE DAILY (Patient taking differently: Take 175 mcg by mouth every evening Separate oral administration of iron- or calcium-containing products and levothyroxine by at least 4 hours.) 90 tablet 0 11/16/2020 at pm     omeprazole (PRILOSEC) 20 MG DR capsule Take 1 capsule (20 mg) by mouth 2 times daily 60 capsule 11 11/16/2020 at Unknown time     potassium chloride ER (KLOR-CON M) 10 MEQ CR tablet TAKE ONE TABLET BY MOUTH ONCE DAILY (Patient taking differently: Take 10 mEq by mouth every evening ) 90 tablet 2 11/16/2020 at pm     pramipexole (MIRAPEX) 0.125 MG tablet Take 1-20 tablets (0.125-2.5 mg) by mouth At Bedtime TAKE 1-2 TABLETS BY MOUTH AT BEDTIME (Patient taking differently: Take 0.125-0.25 mg by mouth At Bedtime TAKE 1-2 TABLETS BY MOUTH AT BEDTIME) 180 tablet 1 11/16/2020 at hs     simvastatin (ZOCOR) 20 MG tablet TAKE ONE TABLET BY MOUTH EVERY NIGHT AT BEDTIME  (Patient taking differently: Take 20 mg by mouth At Bedtime ) 90 tablet 2 2020 at Unknown time     traZODone (DESYREL) 150 MG tablet TAKE ONE TABLET BY MOUTH EVERY NIGHT AT BEDTIME (Patient taking differently: Take 150 mg by mouth At Bedtime ) 90 tablet 1 2020 at hs     VITAMIN D3 25 MCG (1000 UT) tablet TAKE ONE TABLET BY MOUTH ONCE DAILY (Patient taking differently: Take 1 tablet by mouth every evening ) 100 tablet 2 2020 at pm     warfarin ANTICOAGULANT (JANTOVEN ANTICOAGULANT) 5 MG tablet TAKE TWO AND ONE-HALF TABLETS BY MOUTH EVERY MONDAY, TWO TABLETS ON ALL OTHER DAYS OR AS DIRECTED BY ANTICOAGULATION CLINIC (Patient taking differently: Take by mouth every evening TAKE TWO AND ONE-HALF TABLETS BY MOUTH EVERY MONDAY, TWO TABLETS ON ALL OTHER DAYS OR AS DIRECTED BY ANTICOAGULATION CLINIC) 157 tablet 1 2020 at pm     acetaminophen (TYLENOL) 500 MG tablet Take 500-1,000 mg by mouth every 6 hours as needed for mild pain   Unknown at Unknown time     alcohol swab prep pads Use to swab area of injection/mike as directed. 100 each 3      blood glucose (NO BRAND SPECIFIED) test strip Use to test blood sugar 2-3x/day. To accompany: Blood Glucose Monitor Brands: per insurance. 100 strip 11      blood glucose calibration (NO BRAND SPECIFIED) solution To accompany: Blood Glucose Monitor Brands: per insurance. 1 Bottle 3      blood glucose monitoring (NO BRAND SPECIFIED) meter device kit Use to test blood sugar daily or as directed. Preferred blood glucose meter OR supplies to accompany: Blood Glucose Monitor Brands: per insurance. 1 kit 0      [] cephALEXin (KEFLEX) 500 MG capsule Take 1 capsule by mouth 4 times daily   Unknown at Unknown time     EPINEPHrine (ANY BX GENERIC EQUIV) 0.3 MG/0.3ML injection 2-pack Inject 0.3 mLs (0.3 mg) into the muscle once as needed for anaphylaxis 0.6 mL 0 never used     mometasone-formoterol (DULERA) 200-5 MCG/ACT oral inhaler Inhale 2 puffs into the lungs  2 times daily 13 g 1      order for DME Equipment being ordered:x2 Biacare 30/40mmHg compression wraps with x2 extra prs of compression liners 1 each 0      order for DME Equipment being ordered: Nebulizer 1 Device 0      order for DME Equipment being ordered: CPAP  AIRSENSE 10  5-18 CM H20  # [id number]   DN# [id number]        order for DME Equipment being ordered: DEPENDS SIZE LARGE 60 Month 5      order for DME Equipment being ordered: INCONTINENCE PADS   QID 1 Month 11      thin (NO BRAND SPECIFIED) lancets Use with lanceting device. To accompany: Blood Glucose Monitor Brands: per insurance. 1 each 6      trimethoprim-polymyxin b (POLYTRIM) 22553-9.1 UNIT/ML-% ophthalmic solution Place 1-2 drops Into the left eye every 6 hours 10 mL 0              Discharge Medications:     Current Discharge Medication List      CONTINUE these medications which have CHANGED    Details   furosemide (LASIX) 40 MG tablet Take 60 mg every morning and 40 mg every afternoon, reassess dosage at follow-up with primary care provider  Qty: 180 tablet, Refills: 4    Associated Diagnoses: Lymphedema of both lower extremities         CONTINUE these medications which have NOT CHANGED    Details   ADVAIR -21 MCG/ACT inhaler INHALE TWO PUFFS BY MOUTH TWICE A DAY  Qty: 12 g, Refills: 3    Associated Diagnoses: Chronic obstructive pulmonary disease, unspecified COPD type (H)      albuterol (PROAIR HFA/PROVENTIL HFA/VENTOLIN HFA) 108 (90 Base) MCG/ACT inhaler INHALE TWO PUFFS BY MOUTH EVERY 6 HOURS AS NEEDED FOR SHORTNESS OF BREATH DIFFICULTY BREATHING OR WHEEZING  Qty: 18 g, Refills: 0    Comments: OK to fill today Needs follow up virtual visit before further refills  Associated Diagnoses: Mild persistent asthma without complication; Chronic obstructive pulmonary disease, unspecified COPD type (H)      albuterol (PROVENTIL) (2.5 MG/3ML) 0.083% neb solution Take 1 vial (2.5 mg) by nebulization every 6 hours as needed for shortness of breath /  dyspnea or wheezing  Qty: 1 Box, Refills: 0    Comments: The patient requests that this prescription be held on file for filling in the near future.  Associated Diagnoses: Mild persistent asthma without complication; Chronic obstructive pulmonary disease, unspecified COPD type (H)      allopurinol (ZYLOPRIM) 300 MG tablet TAKE ONE TABLET BY MOUTH ONCE DAILY  Qty: 90 tablet, Refills: 1    Associated Diagnoses: Acute gouty arthritis      ARIPiprazole (ABILIFY) 10 MG tablet TAKE ONE TABLET BY MOUTH ONCE DAILY  Qty: 90 tablet, Refills: 1    Associated Diagnoses: Major depressive disorder, recurrent episode, moderate (H)      buPROPion (WELLBUTRIN XL) 150 MG 24 hr tablet TAKE ONE TABLET BY MOUTH EVERY EVENING  Qty: 90 tablet, Refills: 0    Comments: OK to fill today Needs Provider follow up appointment before further refills  Associated Diagnoses: Moderate mixed bipolar I disorder (H)      gabapentin (NEURONTIN) 300 MG capsule Take 1 capsule (300 mg) by mouth At Bedtime TAKE ONE CAPSULE BY MOUTH ONCE DAILY AT BEDTIME WITH A 600 MG TABLET FOR A TOTAL DOSE  MG  Qty: 90 capsule, Refills: 3    Associated Diagnoses: Polyneuropathy in other diseases classified elsewhere (H)      gabapentin (NEURONTIN) 600 MG tablet TAKE ONE TABLET BY MOUTH THREE TIMES A DAY  Qty: 90 tablet, Refills: 3    Associated Diagnoses: Polyneuropathy in other diseases classified elsewhere (H)      ipratropium - albuterol 0.5 mg/2.5 mg/3 mL (DUONEB) 0.5-2.5 (3) MG/3ML neb solution Take 1 vial (3 mLs) by nebulization every 6 hours as needed for shortness of breath / dyspnea or wheezing  Qty: 30 vial, Refills: 11    Associated Diagnoses: Chronic obstructive pulmonary disease, unspecified COPD type (H)      lamoTRIgine (LAMICTAL) 100 MG tablet TAKE ONE TABLET BY MOUTH ONCE DAILY  Qty: 90 tablet, Refills: 1    Associated Diagnoses: Major depressive disorder, recurrent episode, moderate (H)      levothyroxine (SYNTHROID/LEVOTHROID) 175 MCG tablet TAKE  ONE TABLET BY MOUTH ONCE DAILY  Qty: 90 tablet, Refills: 0    Associated Diagnoses: Hypothyroidism, unspecified type      omeprazole (PRILOSEC) 20 MG DR capsule Take 1 capsule (20 mg) by mouth 2 times daily  Qty: 60 capsule, Refills: 11    Associated Diagnoses: Gastroesophageal reflux disease without esophagitis      potassium chloride ER (KLOR-CON M) 10 MEQ CR tablet TAKE ONE TABLET BY MOUTH ONCE DAILY  Qty: 90 tablet, Refills: 2    Associated Diagnoses: Lymphedema      pramipexole (MIRAPEX) 0.125 MG tablet Take 1-20 tablets (0.125-2.5 mg) by mouth At Bedtime TAKE 1-2 TABLETS BY MOUTH AT BEDTIME  Qty: 180 tablet, Refills: 1    Associated Diagnoses: Restless leg syndrome      simvastatin (ZOCOR) 20 MG tablet TAKE ONE TABLET BY MOUTH EVERY NIGHT AT BEDTIME  Qty: 90 tablet, Refills: 2    Associated Diagnoses: Hypercholesteremia      traZODone (DESYREL) 150 MG tablet TAKE ONE TABLET BY MOUTH EVERY NIGHT AT BEDTIME  Qty: 90 tablet, Refills: 1    Associated Diagnoses: Major depressive disorder, recurrent episode, moderate (H)      VITAMIN D3 25 MCG (1000 UT) tablet TAKE ONE TABLET BY MOUTH ONCE DAILY  Qty: 100 tablet, Refills: 2    Associated Diagnoses: Lymphedema of both lower extremities      warfarin ANTICOAGULANT (JANTOVEN ANTICOAGULANT) 5 MG tablet TAKE TWO AND ONE-HALF TABLETS BY MOUTH EVERY MONDAY, TWO TABLETS ON ALL OTHER DAYS OR AS DIRECTED BY ANTICOAGULATION CLINIC  Qty: 157 tablet, Refills: 1    Associated Diagnoses: History of recurrent deep vein thrombosis (DVT)      acetaminophen (TYLENOL) 500 MG tablet Take 500-1,000 mg by mouth every 6 hours as needed for mild pain      alcohol swab prep pads Use to swab area of injection/mike as directed.  Qty: 100 each, Refills: 3    Associated Diagnoses: Type 2 diabetes mellitus without complication, without long-term current use of insulin (H)      blood glucose (NO BRAND SPECIFIED) test strip Use to test blood sugar 2-3x/day. To accompany: Blood Glucose Monitor  Brands: per insurance.  Qty: 100 strip, Refills: 11    Associated Diagnoses: Type 2 diabetes mellitus without complication, without long-term current use of insulin (H)      blood glucose calibration (NO BRAND SPECIFIED) solution To accompany: Blood Glucose Monitor Brands: per insurance.  Qty: 1 Bottle, Refills: 3    Associated Diagnoses: Type 2 diabetes mellitus without complication, without long-term current use of insulin (H)      blood glucose monitoring (NO BRAND SPECIFIED) meter device kit Use to test blood sugar daily or as directed. Preferred blood glucose meter OR supplies to accompany: Blood Glucose Monitor Brands: per insurance.  Qty: 1 kit, Refills: 0    Associated Diagnoses: Type 2 diabetes mellitus without complication, without long-term current use of insulin (H)      EPINEPHrine (ANY BX GENERIC EQUIV) 0.3 MG/0.3ML injection 2-pack Inject 0.3 mLs (0.3 mg) into the muscle once as needed for anaphylaxis  Qty: 0.6 mL, Refills: 0    Associated Diagnoses: Anaphylactic reaction to bee sting, undetermined intent, subsequent encounter      mometasone-formoterol (DULERA) 200-5 MCG/ACT oral inhaler Inhale 2 puffs into the lungs 2 times daily  Qty: 13 g, Refills: 1    Associated Diagnoses: COPD exacerbation (H); Chronic obstructive pulmonary disease, unspecified COPD type (H)      !! order for DME Equipment being ordered:x2 [employer] 30/40mmHg compression wraps with x2 extra prs of compression liners  Qty: 1 each, Refills: 0    Associated Diagnoses: Secondary lymphedema      !! order for DME Equipment being ordered: Nebulizer  Qty: 1 Device, Refills: 0    Associated Diagnoses: COPD exacerbation (H); Chronic obstructive pulmonary disease, unspecified COPD type (H)      !! order for DME Equipment being ordered: CPAP  AIRSENSE 10  5-18 CM H20  SN# [id number]   DN# [id number]      !! order for DME Equipment being ordered: DEPENDS SIZE LARGE  Qty: 60 Month, Refills: 5    Associated Diagnoses: Mixed incontinence      !! order  for DME Equipment being ordered: INCONTINENCE PADS   QID  Qty: 1 Month, Refills: 11    Associated Diagnoses: Other urinary incontinence      thin (NO BRAND SPECIFIED) lancets Use with lanceting device. To accompany: Blood Glucose Monitor Brands: per insurance.  Qty: 1 each, Refills: 6    Associated Diagnoses: Type 2 diabetes mellitus without complication, without long-term current use of insulin (H)       !! - Potential duplicate medications found. Please discuss with provider.      STOP taking these medications       cephALEXin (KEFLEX) 500 MG capsule Comments:   Reason for Stopping:         trimethoprim-polymyxin b (POLYTRIM) 19187-5.1 UNIT/ML-% ophthalmic solution Comments:   Reason for Stopping:                          Brief History of Illness:     From Admission H+P:   Bia Bill is a 54 year old female who was just recently admitted for right heart failure and discharged 4 days prior to this presentation now presents after an episode of weakness where she fell and also notices more dyspnea on exertion.  She had been feeling okay after discharge until the morning of admission.  She got out of bed suddenly felt weak in the legs and felt back against the bed hitting the bed frame with her right low back.  She did not lose consciousness.  A friend was there to try to catch her but was unable to.  She may have felt a little lightheaded but mostly she felt weak in the lower extremities bilaterally.  Is been generally feeling a little more weak all day since then.  She also notices more dyspnea on exertion but usual shortness of breath at rest.  No chest pain.  No cough, myalgias, fever, sore throat, loss of taste or smell or known COVID exposures since her discharge.  Planes of feeling more bloated in the abdomen which also pushes up on her diaphragms making it harder to breathe.  She did not have her usual bowel movement on the morning of her presentation.               TODAY:     Subjective:  Much improved  "today.  Patient is bright and energetic, no racing thoughts but not at all sleepy or groggy today.  Clear and appropriate.  Says she feels a bunch better after using the BIPAP last night.  No pain.  No dyspnea.  Doing well on room air.  No fever or chills.  No other concerns/changes.  Feels ready for discharge home today.      ROS:   ROS: 10 point ROS neg other than the symptoms noted above in the HPI.   /55 (BP Location: Left arm)   Pulse 68   Temp 97.2  F (36.2  C) (Oral)   Resp 16   Ht 1.473 m (4' 10\")   Wt 111.7 kg (246 lb 4.1 oz)   LMP 09/27/2009   SpO2 93%   BMI 51.47 kg/m     EXAM:  General: awake and alert, NAD, oriented x 3  Head: normocephalic  Neck: unremarkable, no lymphadenopathy   HEENT: oropharynx pink and moist    Heart: Regular rate and rhythm, no murmurs, rubs, or gallops  Lungs: clear to auscultation bilaterally with good air movement throughout  Abdomen: soft, non-tender, no masses or organomegaly  Extremities: 1+ edema in lower extremities - still present but improved from yesterday.    Skin unremarkable.            Hospital Course:     Bia Bill is a 54 year old female admitted on 11/17/2020. She presents after having some lower extremity weakness and a fall and is noticing more dyspnea on exertion.     Acute cor pulmonale/acute exacerbation of right sided and diastolic heart failure   11/17/20 --   H/o recent right heart failure admission. Weight up 9# from recent discharge, BNP increased, increased dyspnea on exertion, weakness.    - furosemide 40 mg IV q 8 hours   - O2 as needed  11/18/2020 -- improving, diuresing well, creatinine up just slightly.  Slowing lasix to twice daily.  Follow, wean oxygen as able.   11/19/2020 -- much improved after initiating home BIPAP yesterday.  Doing well on room air today.  Feels ready for discharge home.  Checking weight prior to discharge to help primary care provider to monitor this, but ok for discharge today.  Will increase home " "lasix to 60/40 from 40/40, follow-up with primary care provider on Monday as planned and recheck weight, breathing and creatinine and reassess lasix dosage at that time.  Discussed that we anticipate that consistent use of her BIPAP for sleep apnea will help with this over time.        Acute hypercapnic respiratory failure with respiratory acidosis due to untreated GILES  11/18/2020 -- needs BIPAP for her GILES/hypoventilation syndrome as below and was set up for this but hasn't used it yet.  Setting up home machine, will repeat VBG in a few hours once on this and again in AM.    11/19/2020 -- resolved quickly on home BIPAP and remained baseline/compensated this AM.  Continue home BIPAP for GILES on discharge.       GILES  Obesity hypoventilation syndrome  Chronic respiratory failure with hypercapnia   11/17/20 --  Patient was set up with home BiPAP in the ED on admission   11/18/2020 -- starting new home BIPAP tonight as above.   11/19/2020 -- did well with BIPAP, says she slept \"the best she has in a long time\".  Continue BiPAP on discharge.       Mild persistent asthma without exacerbation     No wheezes.    - continue home Advair     Generalized weakness  Musculoskeletal soft tissue injury    Became weak in lower extremities and fell against the bed. Nonfocal neurologic exam. No clear etiology for the weakness. Right low back pain. Does not seem severe.    - physical therapy evaluation  11/19/2020 -- back to baseline at time of discharge per patient      Bipolar disorder  11/17/20 --   Appears compensated - continue Abilify, buproprion  11/19/2020 -- stable, mood and affect and speech appropriate at time of discharge.  Continue prior to admission medications.       Diabetes mellitus type 2  11/17/20 --   Well controlled with diet- continued  11/18/2020 -- recent A1c was < 6 on 11/8.    11/19/2020 -- no change on discharge.       Restless legs syndrome   - continue mirapex     Erythrocytosis, history of    11/17/20 -- " Followed by Dr. Watts. H/o phlebotomy. Hgb 10.8 on admission.   11/19/2020 -- Stable.      Leucocytosis without acute infection    11/17/20 --   On admission, WBC 15.0. New from 11/13. No fever. Suspect demargination.   11/18/2020 -- normalized without intervention.       Chronic thrombocytopenia   11/17/20 --  Mild. platelets 118k   11/19/2020 -- stable.      H/o DVT, positive Lupus anticoagulant    On chronic warfarin   - continue, pharmacy to dose.      Asymptomatic COVID screen negative on admission.          Diet: Consistent Carbohydrate Diet 8232-9285 Calories: Moderate Consistent CHO (4-6 CHO units/meal)       DVT Prophylaxis: Warfarin     Goldsmith Catheter: not present     Code Status:   Full                 Discharge Instructions and Follow-Up:     Discharge diet: Orders Placed This Encounter      High Consistent CHO Diet       Discharge activity: Activity as tolerated   Discharge follow-up: Follow up with primary care provider on Monday as planned           Discharge Disposition:     Discharged to home      Attestation:  I have reviewed today's vital signs, notes, medications, labs and imaging.  Amount of time performed on this discharge summary: 45 minutes.    Dre Engle MD, MD

## 2020-11-19 NOTE — PROGRESS NOTES
"Care Management Discharge Note    Discharge Date: 11/19/20       Discharge Disposition: Home Care;Outpatient Mental Health    Discharge Services: County Worker;Mental Health Resources;Transportation Services    Discharge DME: Walker    Discharge Transportation: family or friend will provide    Private pay costs discussed: Not applicable    Education Provided on the Discharge Plan:  yes  Persons Notified of Discharge Plans: Pt  Patient/Family in Agreement with the Plan: yes    Handoff Referral Completed: Yes    Additional Information:  SW spoke with Pt today via phone to confirm discharge plan for today. Pt stated that her friend Maurice would be providing transportation around 1400 today.     Pt continues to accept [facility] ([phone] Fax: [phone]) services -RN, PT    Pt verbalized no other needs for discharge and stated that she is \"looking forward to sleeping in her own bed with her bipap tonight.\"    Patient is current with below case workers:   SO[facility] WHITTAKER - Amol Collins (Pt does not have phone #).   [facility] Coord[facility]- [facility] LINDA - Gertrude Coto [phone]     She is followed by Kd Morris at [facility] for bipolar disorder.      Pt states she has a follow-up appt already scheduled with her PCP on 11/23/20    Plan:    Return home with [facility] ([phone] Fax: [phone])    Friend Maurice to transport     Referral to [facility] Care Coordination for continued follow-up  *High Unplanned Readmission Risk*      JESUS Sainz      "

## 2020-11-19 NOTE — PLAN OF CARE
[facility] DISCHARGE NOTE    Patient discharged to home at 2:05 PM via wheel chair. Accompanied by staff. Discharge instructions reviewed with patient, opportunity offered to ask questions. Prescriptions - None ordered for discharge. All belongings sent with patient.    Julianna Ferreira RN

## 2020-11-19 NOTE — PHARMACY-ANTICOAGULATION SERVICE
Clinical Pharmacy- Warfarin Discharge Note  This patient is currently on warfarin for the treatment of DVT / PE.  INR Goal= 2-3  Expected length of therapy lifetime.    Warfarin PTA Regimen: 12.5mg M, 10mg rest of week.      Anticoagulation Dose History     Recent Dosing and Labs Latest Ref Rng & Units 11/9/2020 11/10/2020 11/11/2020 11/13/2020 11/17/2020 11/18/2020 11/19/2020    LISET IMS TEMPLATE - 12.5 mg - - - - - -    Warfarin 5 mg - - - - - 10 mg 10 mg -    Warfarin 7.5 mg - - 15 mg - - - - -    INR 0.86 - 1.14 1.20(H) 1.28(H) 1.36(H) 1.79(H) 2.37(H) 2.01(H) 2.30(H)    INR Point of Care 0.86 - 1.14 - - - - - - -          Your INR was 2.30 today, so continue to take your home coumadin regimen of coumadin, 12.5 mg on Monday and 10 mg all other days, starting with 10 mg tonight, Thursday, November 19th. Call the [facility] @ [phone] early next week to reschedule your 11- appointment,

## 2020-11-19 NOTE — PROGRESS NOTES
ANTICOAGULATION  MANAGEMENT: Discharge Review    Bia Bill chart reviewed for anticoagulation continuity of care    Hospital Admission on 11/17-11/19 for Acute cor pulmonle.    Discharge disposition: Home    Results:    Recent labs: (last 7 days)     11/13/20  0936 11/17/20  0948 11/18/20  0558 11/19/20  0617   INR 1.79* 2.37* 2.01* 2.30*     Anticoagulation inpatient management:     home regimen continued    Anticoagulation discharge instructions:     Warfarin dosing: home regimen continued   Bridging: No   INR goal change: No      Medication changes affecting anticoagulation: Yes: Increased furosemide    Additional factors affecting anticoagulation: Yes: Patient has had weight gain of 9 lbs in 4 days, edema continues to be an ongoing problem    Plan     No adjustment to anticoagulation plan needed    Patient not contacted    Anticoagulation Calendar updated   Continue with plan to recheck on 11/20, lab visits et up    Tara Rivas RN

## 2020-11-20 ENCOUNTER — PATIENT OUTREACH (OUTPATIENT)
Dept: NURSING | Facility: CLINIC | Age: 55
End: 2020-11-20
Payer: COMMERCIAL

## 2020-11-20 ASSESSMENT — ACTIVITIES OF DAILY LIVING (ADL): DEPENDENT_IADLS:: INDEPENDENT

## 2020-11-20 NOTE — PROGRESS NOTES
Clinic Care Coordination Contact    Follow Up Progress Note      Assessment: Patient calling clinic care coordinator.    Patient was inpatient at [facility] from 11/17/20 to 11/19/20 CHF.     Patient states this hospital admission was scary because she was told she has congestive heart failure and a heart murmer.  We discussed what CHF is and life style changes she needs to make to manage it. Such as stop smoking, weight loss, diet management, exercise.     Instructed patient to weigh daily and record her weight. If she has 3 pounds or more weight gain over night she needs to call cardiology. If she has five pounds or more in a week she needs to call cardiology.  Patient states currently she had only minimal edema in her feet and lower legs.   Patient states she has compression wraps but has not been wearing them. Advised she start wearing them now.     Patient denies shortness of breath. She sounds good on the phone.     Patient now has her Bipap machine for sleeping. She states she used it in the hospital and it was great. She states she has not had such a good sleep before.     Patient has orders for home care from [facility].       We reviewed her medication list, explained her inhalers and the use. Emphasized the importance of medication compliance.      Goals addressed this encounter:   Goals Addressed                 This Visit's Progress      #1 (pt-stated)   20%     Goal Statement: I want my shortness of breath to improve    Date goal set: 11/1/19    Measure of Success: When I am not short of breath    Barriers: COPD    Strengths: Motivated    Date to Achieve By: March 2021    Patient expressed understanding of goal: Yes    Action steps to achieve this goal    1. I will use nebulizer as instructed  2. I will see PCP  as scheduled  3. I will use my asthma action plan         Intervention/Education provided during outreach: As above     Outreach Frequency: monthly    Plan:   Patient will follow up  with scheduled appointments.    Care Coordinator will follow up in one month.    Michela Whitaker RN, [facility] - Primary Care Clinic RN Coordinator  [facility]   11/20/2020     [phone]

## 2020-11-21 ENCOUNTER — TELEPHONE (OUTPATIENT)
Dept: FAMILY MEDICINE | Facility: CLINIC | Age: 55
End: 2020-11-21

## 2020-11-21 NOTE — TELEPHONE ENCOUNTER
[facility] now requests orders and shares plan of care/discharge summaries for some patients through [employer].  Please REPLY TO THIS MESSAGE OR ROUTE BACK TO THE AUTHOR in order to give authorization for orders when needed.  This is considered a verbal order, you will still receive a faxed copy of orders for signature.  Thank you for your assistance in improving collaboration for our patients.    PT eval and treat, SN 2 week 2, 1 week 2, 2 prn    Also, she has an appointment on 11/23/20, at that time is the INR going to be drawn?  If not when is it to be drawn next so we can do it in the home.  Are there any labs you want us to draw while she is on homecare?    Thanks.    Sherrill Merlos RN

## 2020-11-23 ENCOUNTER — OFFICE VISIT (OUTPATIENT)
Dept: FAMILY MEDICINE | Facility: CLINIC | Age: 55
End: 2020-11-23
Payer: COMMERCIAL

## 2020-11-23 ENCOUNTER — TELEPHONE (OUTPATIENT)
Dept: BEHAVIORAL HEALTH | Facility: CLINIC | Age: 55
End: 2020-11-23

## 2020-11-23 VITALS
RESPIRATION RATE: 22 BRPM | HEART RATE: 65 BPM | WEIGHT: 242.7 LBS | SYSTOLIC BLOOD PRESSURE: 128 MMHG | DIASTOLIC BLOOD PRESSURE: 59 MMHG | BODY MASS INDEX: 50.72 KG/M2 | TEMPERATURE: 97.9 F | OXYGEN SATURATION: 97 %

## 2020-11-23 DIAGNOSIS — I89.0 LYMPHEDEMA OF BOTH LOWER EXTREMITIES: Primary | ICD-10-CM

## 2020-11-23 DIAGNOSIS — D69.6 THROMBOCYTOPENIA (H): Chronic | ICD-10-CM

## 2020-11-23 DIAGNOSIS — Z86.718 HISTORY OF DEEP VENOUS THROMBOSIS: Chronic | ICD-10-CM

## 2020-11-23 DIAGNOSIS — D75.1 SECONDARY POLYCYTHEMIA: Chronic | ICD-10-CM

## 2020-11-23 DIAGNOSIS — I27.20 PULMONARY HYPERTENSION (H): ICD-10-CM

## 2020-11-23 DIAGNOSIS — G47.33 OSA (OBSTRUCTIVE SLEEP APNEA): Chronic | ICD-10-CM

## 2020-11-23 LAB
ANION GAP SERPL CALCULATED.3IONS-SCNC: 4 MMOL/L (ref 3–14)
BUN SERPL-MCNC: 7 MG/DL (ref 7–30)
CALCIUM SERPL-MCNC: 8.6 MG/DL (ref 8.5–10.1)
CHLORIDE SERPL-SCNC: 101 MMOL/L (ref 94–109)
CO2 SERPL-SCNC: 33 MMOL/L (ref 20–32)
CREAT SERPL-MCNC: 0.7 MG/DL (ref 0.52–1.04)
GFR SERPL CREATININE-BSD FRML MDRD: >90 ML/MIN/{1.73_M2}
GLUCOSE SERPL-MCNC: 90 MG/DL (ref 70–99)
POTASSIUM SERPL-SCNC: 3.4 MMOL/L (ref 3.4–5.3)
SODIUM SERPL-SCNC: 138 MMOL/L (ref 133–144)

## 2020-11-23 PROCEDURE — 36415 COLL VENOUS BLD VENIPUNCTURE: CPT | Performed by: FAMILY MEDICINE

## 2020-11-23 PROCEDURE — 80048 BASIC METABOLIC PNL TOTAL CA: CPT | Performed by: FAMILY MEDICINE

## 2020-11-23 PROCEDURE — 85027 COMPLETE CBC AUTOMATED: CPT | Performed by: FAMILY MEDICINE

## 2020-11-23 PROCEDURE — 99213 OFFICE O/P EST LOW 20 MIN: CPT | Performed by: FAMILY MEDICINE

## 2020-11-23 ASSESSMENT — PAIN SCALES - GENERAL: PAINLEVEL: EXTREME PAIN (8)

## 2020-11-23 NOTE — LETTER
November 30, 2020      Bia Bill  [address] [address] [place] [id number]        Dear ,    We are writing to inform you of your test results.    Labs look pretty good.     Dr. Leong/natacha Leal   Basic metabolic panel   Result Value Ref Range    Sodium 138 133 - 144 mmol/L    Potassium 3.4 3.4 - 5.3 mmol/L    Chloride 101 94 - 109 mmol/L    Carbon Dioxide 33 (H) 20 - 32 mmol/L    Anion Gap 4 3 - 14 mmol/L    Glucose 90 70 - 99 mg/dL    Urea Nitrogen 7 7 - 30 mg/dL    Creatinine 0.70 0.52 - 1.04 mg/dL    GFR Estimate >90 >60 mL/min/[1.73_m2]      Comment:      Non  GFR Calc  Starting 12/18/2018, serum creatinine based estimated GFR (eGFR) will be   calculated using the Chronic Kidney Disease Epidemiology Collaboration   (CKD-EPI) equation.      GFR Estimate If Black >90 >60 mL/min/[1.73_m2]      Comment:       GFR Calc  Starting 12/18/2018, serum creatinine based estimated GFR (eGFR) will be   calculated using the Chronic Kidney Disease Epidemiology Collaboration   (CKD-EPI) equation.      Calcium 8.6 8.5 - 10.1 mg/dL   CBC with platelets   Result Value Ref Range    WBC 6.9 4.0 - 11.0 10e9/L    RBC Count 4.93 3.8 - 5.2 10e12/L    Hemoglobin 11.7 11.7 - 15.7 g/dL    Hematocrit 41.2 35.0 - 47.0 %    MCV 84 78 - 100 fl    MCH 23.7 (L) 26.5 - 33.0 pg    MCHC 28.4 (L) 31.5 - 36.5 g/dL    RDW 22.1 (H) 10.0 - 15.0 %    Platelet Count 151 150 - 450 10e9/L

## 2020-11-23 NOTE — PROGRESS NOTES
SUBJECTIVE:                                                    Bia Bill is a 54 year old female who presents to clinic today for the following health issues:    Wt Readings from Last 10 Encounters:   11/23/20 110.1 kg (242 lb 11.2 oz)   11/19/20 111.6 kg (246 lb 0.5 oz)   11/11/20 112.4 kg (247 lb 12.8 oz)   11/05/20 109.8 kg (242 lb)   11/05/20 109.8 kg (242 lb)   10/29/20 109.8 kg (242 lb)   06/22/20 100.1 kg (220 lb 9.6 oz)   01/30/20 105.1 kg (231 lb 12.8 oz)   01/29/20 106.2 kg (234 lb 3.2 oz)   01/23/20 109.9 kg (242 lb 4.6 oz)         Hospital Follow-up Visit:    Hospital/Nursing Home/IP Rehab Facility: [facility]  Date of Admission: 11/08/2020 and 11/17/2020  Date of Discharge: 11/10/2020 and 11/19/2020  Reason(s) for Admission: shortness of breath, congestive heart failure and the last time she was in she also fell.      Was your hospitalization related to COVID-19? No   Problems taking medications regularly:  None  Medication changes since discharge: None  Problems adhering to non-medication therapy:  None    Summary of hospitalization:  [facility] discharge summary reviewed  Diagnostic Tests/Treatments reviewed.  Follow up needed: none  Other Healthcare Providers Involved in Patient s Care:         None  Update since discharge: improved.       Post Discharge Medication Reconciliation: discharge medications reconciled, continue medications without change.  Plan of care communicated with patient                Problem list and histories reviewed & adjusted, as indicated.  Additional history:     Patient Active Problem List   Diagnosis     Mild persistent asthma     Chronic leg pain     History of deep venous thrombosis     Alcohol abuse, in remission     Secondary polycythemia     Chondromalacia of patella     Developmental reading disorder     Esophageal reflux     Hypothyroidism     Fatty liver     GILES (Obstructive Sleep Apnea)/ Obesity Hypoventilation     RLS (restless legs  syndrome)     Tobacco use disorder     Moderate mixed bipolar I disorder (H)     Personality disorder, depressive     Rosacea     [facility]     DDD (degenerative disc disease), cervical     Benign neoplasm of colon (POLYPOSIS)     Somatization disorder     Vitamin D deficiency     Morbid obesity (H)     Cyst of left ovary     Conductive hearing loss of left ear with restricted hearing of right ear     Sensorineural hearing loss (SNHL) of right ear with restricted hearing of left ear     Chronic gout without tophus     Lymphedema of both lower extremities     Type 2 diabetes mellitus (H)     Thrombocytopenia (H)     Acute congestive heart failure, unspecified heart failure type (H)     Chronic respiratory failure with hypercapnia (H)     Hyperlipidemia     Elevated troponin     Hypoxia     Acute cor pulmonale (H)     Past Surgical History:   Procedure Laterality Date     BONE MARROW BIOPSY, BONE SPECIMEN, NEEDLE/TROCAR N/A 11/17/2014    Procedure: BIOPSY BONE MARROW;  Surgeon: Hay Aaron MD;  Location: [facility]     D & C  10/26/2009    with uterine ablation     ENDOVASCULAR PLACEMENT VASCULAR DEVICE Left 2004    Left leg venous stent x 3     ESOPHAGOSCOPY, GASTROSCOPY, DUODENOSCOPY (EGD), COMBINED  04/21/2014    Procedure: Gastroscopy;  Surgeon: Moris Thomas MD;  Location: [facility]     HYSTERECTOMY, PAP NO LONGER INDICATED  01/04/2010     LAPAROSCOPIC CHOLECYSTECTOMY N/A 01/04/2019    Procedure: Laparoscopic cholecystectomy;  Surgeon: Aaron Siegel MD;  Location: [facility]     LITHOTRIPSY  2004    Lithotrypsy       Social History     Tobacco Use     Smoking status: Current Every Day Smoker     Packs/day: 0.50     Years: 43.00     Pack years: 21.50     Types: Cigarettes     Smokeless tobacco: Never Used     Tobacco comment: started at age 11.  Quit during pregnancyX4.  Quit 3 months ago.    Substance Use Topics     Alcohol use: No     Alcohol/week: 0.0 standard drinks     Comment: quit 1995      Family History   Problem Relation Age of Onset     Hypertension Mother      Diabetes Mother      Blood Disease Mother      Heart Disease Mother         chf     Cerebrovascular Disease Mother      Hypertension Father      Cancer Father         lung cancer     Allergies Sister      Diabetes Sister      Depression Sister      Hypertension Sister          Current Outpatient Medications   Medication Sig Dispense Refill     acetaminophen (TYLENOL) 500 MG tablet Take 500-1,000 mg by mouth every 6 hours as needed for mild pain       ADVAIR -21 MCG/ACT inhaler INHALE TWO PUFFS BY MOUTH TWICE A DAY (Patient taking differently: Inhale 2 puffs into the lungs 2 times daily *Do not use more frequently than twice daily.*  Rinse mouth after use.) 12 g 3     albuterol (PROAIR HFA/PROVENTIL HFA/VENTOLIN HFA) 108 (90 Base) MCG/ACT inhaler INHALE TWO PUFFS BY MOUTH EVERY 6 HOURS AS NEEDED FOR SHORTNESS OF BREATH DIFFICULTY BREATHING OR WHEEZING (Patient taking differently: Inhale 2 puffs into the lungs every 6 hours as needed for shortness of breath / dyspnea or wheezing ) 18 g 0     albuterol (PROVENTIL) (2.5 MG/3ML) 0.083% neb solution Take 1 vial (2.5 mg) by nebulization every 6 hours as needed for shortness of breath / dyspnea or wheezing 1 Box 0     alcohol swab prep pads Use to swab area of injection/mike as directed. 100 each 3     allopurinol (ZYLOPRIM) 300 MG tablet TAKE ONE TABLET BY MOUTH ONCE DAILY (Patient taking differently: Take 300 mg by mouth daily ) 90 tablet 1     ARIPiprazole (ABILIFY) 10 MG tablet TAKE ONE TABLET BY MOUTH ONCE DAILY (Patient taking differently: Take 10 mg by mouth daily ) 90 tablet 1     blood glucose (NO BRAND SPECIFIED) test strip Use to test blood sugar 2-3x/day. To accompany: Blood Glucose Monitor Brands: per insurance. 100 strip 11     blood glucose calibration (NO BRAND SPECIFIED) solution To accompany: Blood Glucose Monitor Brands: per insurance. 1 Bottle 3     blood glucose  monitoring (NO BRAND SPECIFIED) meter device kit Use to test blood sugar daily or as directed. Preferred blood glucose meter OR supplies to accompany: Blood Glucose Monitor Brands: per insurance. 1 kit 0     buPROPion (WELLBUTRIN XL) 150 MG 24 hr tablet TAKE ONE TABLET BY MOUTH EVERY EVENING 90 tablet 0     EPINEPHrine (ANY BX GENERIC EQUIV) 0.3 MG/0.3ML injection 2-pack Inject 0.3 mLs (0.3 mg) into the muscle once as needed for anaphylaxis 0.6 mL 0     furosemide (LASIX) 40 MG tablet Take 60 mg every morning and 40 mg every afternoon, reassess dosage at follow-up with primary care provider 180 tablet 4     gabapentin (NEURONTIN) 300 MG capsule Take 1 capsule (300 mg) by mouth At Bedtime TAKE ONE CAPSULE BY MOUTH ONCE DAILY AT BEDTIME WITH A 600 MG TABLET FOR A TOTAL DOSE  MG 90 capsule 3     gabapentin (NEURONTIN) 600 MG tablet TAKE ONE TABLET BY MOUTH THREE TIMES A DAY (Patient taking differently: Take 600 mg by mouth 3 times daily TAKE ONE TABLET BY MOUTH THREE TIMES A DAY) 90 tablet 3     ipratropium - albuterol 0.5 mg/2.5 mg/3 mL (DUONEB) 0.5-2.5 (3) MG/3ML neb solution Take 1 vial (3 mLs) by nebulization every 6 hours as needed for shortness of breath / dyspnea or wheezing 30 vial 11     lamoTRIgine (LAMICTAL) 100 MG tablet TAKE ONE TABLET BY MOUTH ONCE DAILY (Patient taking differently: Take 100 mg by mouth every evening ) 90 tablet 1     levothyroxine (SYNTHROID/LEVOTHROID) 175 MCG tablet TAKE ONE TABLET BY MOUTH ONCE DAILY (Patient taking differently: Take 175 mcg by mouth every evening Separate oral administration of iron- or calcium-containing products and levothyroxine by at least 4 hours.) 90 tablet 0     mometasone-formoterol (DULERA) 200-5 MCG/ACT oral inhaler Inhale 2 puffs into the lungs 2 times daily 13 g 1     omeprazole (PRILOSEC) 20 MG DR capsule Take 1 capsule (20 mg) by mouth 2 times daily 60 capsule 11     order for DME Equipment being ordered:x2 [employer] 30/40mmHg compression wraps with  x2 extra prs of compression liners 1 each 0     order for DME Equipment being ordered: Nebulizer 1 Device 0     order for DME Equipment being ordered: CPAP  AIRSENSE 10  5-18 CM H20  # [id number]   DN# [id number]       order for DME Equipment being ordered: DEPENDS SIZE LARGE 60 Month 5     order for DME Equipment being ordered: INCONTINENCE PADS   QID 1 Month 11     potassium chloride ER (KLOR-CON M) 10 MEQ CR tablet TAKE ONE TABLET BY MOUTH ONCE DAILY (Patient taking differently: Take 10 mEq by mouth every evening ) 90 tablet 2     pramipexole (MIRAPEX) 0.125 MG tablet Take 1-20 tablets (0.125-2.5 mg) by mouth At Bedtime TAKE 1-2 TABLETS BY MOUTH AT BEDTIME (Patient taking differently: Take 0.125-0.25 mg by mouth At Bedtime TAKE 1-2 TABLETS BY MOUTH AT BEDTIME) 180 tablet 1     simvastatin (ZOCOR) 20 MG tablet TAKE ONE TABLET BY MOUTH EVERY NIGHT AT BEDTIME (Patient taking differently: Take 20 mg by mouth At Bedtime ) 90 tablet 2     thin (NO BRAND SPECIFIED) lancets Use with lanceting device. To accompany: Blood Glucose Monitor Brands: per insurance. 1 each 6     traZODone (DESYREL) 150 MG tablet TAKE ONE TABLET BY MOUTH EVERY NIGHT AT BEDTIME (Patient taking differently: Take 150 mg by mouth At Bedtime ) 90 tablet 1     VITAMIN D3 25 MCG (1000 UT) tablet TAKE ONE TABLET BY MOUTH ONCE DAILY (Patient taking differently: Take 1 tablet by mouth every evening ) 100 tablet 2     warfarin ANTICOAGULANT (JANTOVEN ANTICOAGULANT) 5 MG tablet TAKE TWO AND ONE-HALF TABLETS BY MOUTH EVERY MONDAY, TWO TABLETS ON ALL OTHER DAYS OR AS DIRECTED BY ANTICOAGULATION CLINIC (Patient taking differently: Take by mouth every evening TAKE TWO AND ONE-HALF TABLETS BY MOUTH EVERY MONDAY, TWO TABLETS ON ALL OTHER DAYS OR AS DIRECTED BY ANTICOAGULATION CLINIC) 157 tablet 1     Allergies   Allergen Reactions     Darvocet [Propoxyphene N-Apap] Anaphylaxis     Darvocet,Percocet      Percocet [Oxycodone-Acetaminophen] Anaphylaxis, Hives and  Swelling     Has tolerated hydromorphone in the past.      Asa [Aspirin] Hives     Aspirin causes seizures and hives, throat swelling.   Has tolerated ketorolac in the past.        Bee      Ibuprofen Swelling     Throat swelling per patient      Lyrica [Pregabalin] Dizziness and Swelling     Povidone Iodine Rash     Reaction to topical betadine     Tape [Adhesive Tape] Rash       ROS:  Constitutional, HEENT, cardiovascular, pulmonary, gi and gu systems are negative, except as otherwise noted.    OBJECTIVE:                                                    LMP 09/27/2009  There is no height or weight on file to calculate BMI.   GENERAL: healthy, alert, well nourished, well hydrated, no distress  HENT: ear canals- normal; TMs- normal; Nose- normal; Mouth- no ulcers, no lesions  NECK: no tenderness, no adenopathy, no asymmetry, no masses, no stiffness; thyroid- normal to palpation  RESP: lungs clear to auscultation - no rales, no rhonchi, no wheezes  CV: regular rates and rhythm, normal S1 S2, no S3 or S4 and no murmur, no click or rub -  ABDOMEN: soft, no tenderness, no  hepatosplenomegaly, no masses, normal bowel sounds  extm 2 + pitting edema      ASSESSMENT/PLAN:                                                      (I89.0) Lymphedema of both lower extremities  (primary encounter diagnosis)  Plan: LYMPHEDEMA THERAPY REFERRAL, Basic metabolic         panel    (I27.20) Pulmonary hypertension (H)  Plan: PULMONARY REHAB REFERRAL      (D75.1) Secondary polycythemia  Plan: CBC with platelets      (G47.33) GILES (Obstructive Sleep Apnea)/ Obesity Hypoventilation  Using bipap every night does wake up [id number] it but puts it back on.      reports that she has been smoking cigarettes. She has a 21.50 pack-year smoking history. She has never used smokeless tobacco.  Tobacco Cessation Action Plan: Pharmacotherapies : Chantix    Weight management plan: Discussed healthy diet and exercise guidelines      [facility]  NICHELLE

## 2020-11-24 ENCOUNTER — DOCUMENTATION ONLY (OUTPATIENT)
Dept: FAMILY MEDICINE | Facility: CLINIC | Age: 55
End: 2020-11-24

## 2020-11-24 ENCOUNTER — PATIENT OUTREACH (OUTPATIENT)
Dept: NURSING | Facility: CLINIC | Age: 55
End: 2020-11-24
Payer: COMMERCIAL

## 2020-11-24 LAB
ERYTHROCYTE [DISTWIDTH] IN BLOOD BY AUTOMATED COUNT: 22.1 % (ref 10–15)
HCT VFR BLD AUTO: 41.2 % (ref 35–47)
HGB BLD-MCNC: 11.7 G/DL (ref 11.7–15.7)
MCH RBC QN AUTO: 23.7 PG (ref 26.5–33)
MCHC RBC AUTO-ENTMCNC: 28.4 G/DL (ref 31.5–36.5)
MCV RBC AUTO: 84 FL (ref 78–100)
PLATELET # BLD AUTO: 151 10E9/L (ref 150–450)
RBC # BLD AUTO: 4.93 10E12/L (ref 3.8–5.2)
WBC # BLD AUTO: 6.9 10E9/L (ref 4–11)

## 2020-11-24 NOTE — PROGRESS NOTES
"Clinic Care Coordination Contact    Follow Up Progress Note      Assessment: Patient calling RN clinic care coordinator.    Patient states she has a \"dilema\".  She states she can't have home care if she goes to cardiac rehab.   Instructed patient to finish her home care services as they will not go on that long and then she can start cardiac rehab.  Patient would benefit from both services and she can do outpatient cardiac rehab once she completes home care. Patient expresses understanding.    Patient state she has not smoked.   Congratulated her on continued abstinence.     Patient states her legs are swollen about the same as her baseline. She states she has not bee wearing wraps. She states she got a card stating it was time to get new ones, but now she can't find the card. She states this came from the lymphedema clinic. Advised patient to call them and get new wraps ordered. It is important she use these. Patient verifies she is elevating her feet when sitting.     Patient has followed up with PCP. No changes were made. Patient states she did not get a AVS. Instructed patient to call clinic and they can mail the AVS to her.           Patient is scheduled for Lexiscan tomorrow at [place].     Goals addressed this encounter: Stable   Goals Addressed                 This Visit's Progress      #1 (pt-stated)   30%     Goal Statement: I want my shortness of breath to improve    Date goal set: 11/1/19    Measure of Success: When I am not short of breath    Barriers: COPD    Strengths: Motivated    Date to Achieve By: March 2021    Patient expressed understanding of goal: Yes    Action steps to achieve this goal    1. I will use nebulizer as instructed  2. I will see PCP  as scheduled  3. I will use my asthma action plan         Intervention/Education provided during outreach: As above     Outreach Frequency: monthly    Plan:   Patient will attend appointments for 11/25/20  Patient will complete home care services and " then attend cardiac rehab.     Care Coordinator will follow up in one month.    Michela Whitaker RN, [facility] - Primary Care Clinic RN Coordinator  [facility]   11/24/2020    1:16 PM  [phone]

## 2020-11-24 NOTE — PROGRESS NOTES
ANTICOAGULATION     Bia Bill is overdue for INR check.      Left message with home care nurse to determine next recheck date as it was not completed as planned on 11/23    Tara Rivas RN

## 2020-11-25 ENCOUNTER — HOSPITAL ENCOUNTER (OUTPATIENT)
Dept: NUCLEAR MEDICINE | Facility: CLINIC | Age: 55
Setting detail: NUCLEAR MEDICINE
End: 2020-11-25
Attending: INTERNAL MEDICINE
Payer: COMMERCIAL

## 2020-11-25 ENCOUNTER — HOSPITAL ENCOUNTER (OUTPATIENT)
Dept: LAB | Facility: CLINIC | Age: 55
Discharge: HOME | End: 2020-11-25
Attending: INTERNAL MEDICINE | Admitting: INTERNAL MEDICINE
Payer: COMMERCIAL

## 2020-11-25 ENCOUNTER — ANTICOAGULATION THERAPY VISIT (OUTPATIENT)
Dept: FAMILY MEDICINE | Facility: CLINIC | Age: 55
End: 2020-11-25

## 2020-11-25 ENCOUNTER — HOSPITAL ENCOUNTER (OUTPATIENT)
Dept: CARDIOLOGY | Facility: CLINIC | Age: 55
End: 2020-11-25
Attending: INTERNAL MEDICINE
Payer: COMMERCIAL

## 2020-11-25 VITALS — BODY MASS INDEX: 50.8 KG/M2 | WEIGHT: 242 LBS | HEIGHT: 58 IN

## 2020-11-25 DIAGNOSIS — I82.409 DVT (DEEP VENOUS THROMBOSIS) (H): ICD-10-CM

## 2020-11-25 DIAGNOSIS — D75.1 ERYTHROCYTOSIS: ICD-10-CM

## 2020-11-25 DIAGNOSIS — I50.9 ACUTE CONGESTIVE HEART FAILURE, UNSPECIFIED HEART FAILURE TYPE (H): ICD-10-CM

## 2020-11-25 DIAGNOSIS — Z86.718 HISTORY OF DEEP VENOUS THROMBOSIS: ICD-10-CM

## 2020-11-25 DIAGNOSIS — D69.6 THROMBOCYTOPENIA (H): ICD-10-CM

## 2020-11-25 DIAGNOSIS — I73.9 PVD (PERIPHERAL VASCULAR DISEASE) WITH CLAUDICATION (H): ICD-10-CM

## 2020-11-25 DIAGNOSIS — Z86.73 HISTORY OF STROKE: ICD-10-CM

## 2020-11-25 LAB
BASOPHILS # BLD AUTO: 0 10E9/L (ref 0–0.2)
BASOPHILS NFR BLD AUTO: 0.6 %
CV STRESS MAX HR HE: 82
DIFFERENTIAL METHOD BLD: ABNORMAL
EOSINOPHIL # BLD AUTO: 0.1 10E9/L (ref 0–0.7)
EOSINOPHIL NFR BLD AUTO: 1 %
ERYTHROCYTE [DISTWIDTH] IN BLOOD BY AUTOMATED COUNT: 21.3 % (ref 10–15)
HCT VFR BLD AUTO: 39 % (ref 35–47)
HGB BLD-MCNC: 11.2 G/DL (ref 11.7–15.7)
IMM GRANULOCYTES # BLD: 0 10E9/L (ref 0–0.4)
IMM GRANULOCYTES NFR BLD: 0.4 %
INR PPP: 1.95 (ref 0.86–1.14)
LYMPHOCYTES # BLD AUTO: 1.1 10E9/L (ref 0.8–5.3)
LYMPHOCYTES NFR BLD AUTO: 20.4 %
MCH RBC QN AUTO: 23.7 PG (ref 26.5–33)
MCHC RBC AUTO-ENTMCNC: 28.7 G/DL (ref 31.5–36.5)
MCV RBC AUTO: 83 FL (ref 78–100)
MONOCYTES # BLD AUTO: 0.3 10E9/L (ref 0–1.3)
MONOCYTES NFR BLD AUTO: 5.2 %
NEUTROPHILS # BLD AUTO: 3.8 10E9/L (ref 1.6–8.3)
NEUTROPHILS NFR BLD AUTO: 72.4 %
NRBC # BLD AUTO: 0 10*3/UL
NRBC BLD AUTO-RTO: 0 /100
NUC STRESS EJECTION FRACTION: 78 %
PLATELET # BLD AUTO: 134 10E9/L (ref 150–450)
RATE PRESSURE PRODUCT: NORMAL
RBC # BLD AUTO: 4.73 10E12/L (ref 3.8–5.2)
STRESS ECHO BASELINE DIASTOLIC HE: 64
STRESS ECHO BASELINE HR: 75
STRESS ECHO BASELINE SYSTOLIC BP: 122
STRESS ECHO CALCULATED PERCENT HR: 49 %
STRESS ECHO LAST STRESS DIASTOLIC BP: 58
STRESS ECHO LAST STRESS SYSTOLIC BP: 122
STRESS ECHO TARGET HR: 167
WBC # BLD AUTO: 5.2 10E9/L (ref 4–11)

## 2020-11-25 PROCEDURE — 93018 CV STRESS TEST I&R ONLY: CPT | Performed by: INTERNAL MEDICINE

## 2020-11-25 PROCEDURE — A9502 TC99M TETROFOSMIN: HCPCS | Performed by: INTERNAL MEDICINE

## 2020-11-25 PROCEDURE — 85025 COMPLETE CBC W/AUTO DIFF WBC: CPT | Performed by: INTERNAL MEDICINE

## 2020-11-25 PROCEDURE — 343N000001 HC RX 343: Performed by: INTERNAL MEDICINE

## 2020-11-25 PROCEDURE — 85610 PROTHROMBIN TIME: CPT | Performed by: INTERNAL MEDICINE

## 2020-11-25 PROCEDURE — 36415 COLL VENOUS BLD VENIPUNCTURE: CPT | Performed by: INTERNAL MEDICINE

## 2020-11-25 PROCEDURE — 250N000011 HC RX IP 250 OP 636: Performed by: INTERNAL MEDICINE

## 2020-11-25 PROCEDURE — 93016 CV STRESS TEST SUPVJ ONLY: CPT | Performed by: INTERNAL MEDICINE

## 2020-11-25 PROCEDURE — 78452 HT MUSCLE IMAGE SPECT MULT: CPT | Mod: 26 | Performed by: INTERNAL MEDICINE

## 2020-11-25 PROCEDURE — 93017 CV STRESS TEST TRACING ONLY: CPT

## 2020-11-25 PROCEDURE — 78452 HT MUSCLE IMAGE SPECT MULT: CPT

## 2020-11-25 RX ORDER — REGADENOSON 0.08 MG/ML
0.4 INJECTION, SOLUTION INTRAVENOUS ONCE
Status: COMPLETED | OUTPATIENT
Start: 2020-11-25 | End: 2020-11-25

## 2020-11-25 RX ADMIN — REGADENOSON 0.4 MG: 0.08 INJECTION, SOLUTION INTRAVENOUS at 09:39

## 2020-11-25 RX ADMIN — TETROFOSMIN 10.8 MCI.: 1.38 INJECTION, POWDER, LYOPHILIZED, FOR SOLUTION INTRAVENOUS at 08:10

## 2020-11-25 RX ADMIN — TETROFOSMIN 34.5 MCI.: 1.38 INJECTION, POWDER, LYOPHILIZED, FOR SOLUTION INTRAVENOUS at 09:43

## 2020-11-25 ASSESSMENT — MIFFLIN-ST. JEOR: SCORE: 1592.45

## 2020-11-25 NOTE — PROGRESS NOTES
ANTICOAGULATION MANAGEMENT     Patient Name:  Bia Bill  Date:  2020    ASSESSMENT /SUBJECTIVE:    Today's INR result of 1.95 is subtherapeutic. Goal INR of 2.0-3.0      Warfarin dose taken: Warfarin taken as instructed    Diet: No new diet changes affecting INR    Medication changes/ interactions: No new medications/supplements affecting INR    Previous INR: Therapeutic     S/S of bleeding or thromboembolism: No    New injury or illness: No    Upcoming surgery, procedure or cardioversion: No    Additional findings: None       PLAN:    Telephone call with Bia regarding INR result and instructed:     Warfarin Dosing Instructions: Continue your current warfarin dose 12.5mg every Mon; 10mg other days of the week.     Instructed patient to follow up no later than: 1 week  Lab visit scheduled    Education provided: None required      Norah verbalizes understanding and agrees to warfarin dosing plan.    Instructed to call the Anticoagulation Clinic for any changes, questions or concerns. (#[phone])        David Coto RN      OBJECTIVE:  Recent labs: (last 7 days)     20  0617 20  1121   INR 2.30* 1.95*         No question data found.  Anticoagulation Summary  As of 2020    INR goal:  2.0-3.0   TTR:  80.9 % (11.7 mo)   INR used for dosin.95 (2020)   Warfarin maintenance plan:  12.5 mg (5 mg x 2.5) every Mon; 10 mg (5 mg x 2) all other days   Full warfarin instructions:  12.5 mg every Mon; 10 mg all other days   Weekly warfarin total:  72.5 mg   Plan last modified:  Carmen Way RN (2020)   Next INR check:  12/3/2020   Priority:  Maintenance   Target end date:  Indefinite    Indications    Long term current use of anticoagulant therapy (Resolved) [Z79.01]  History of deep venous thrombosis [Z86.718]  History of stroke symptoms  stroke ruled out on imaging (Resolved) [Z86.73]  Thrombocytopenia (H) [D69.6]             Anticoagulation Episode Summary     INR check  location:      Preferred lab:      Send INR reminders to:  ANGI BHAKTA    Comments:  * lab draw due to elevated hematocrit (fingerstick meters don't work). Needs to have adjusted INR if hct over 55%. LEFT LE. STENT x 3 LEFT UPPER LEG. Previous arterial clot. AS of 10/9/19 Goal range 2.0-3.0.      Anticoagulation Care Providers     Provider Role Specialty Phone number    Kd Leong MD Referring [facility] [phone]

## 2020-11-27 ENCOUNTER — PATIENT OUTREACH (OUTPATIENT)
Dept: NURSING | Facility: CLINIC | Age: 55
End: 2020-11-27
Payer: COMMERCIAL

## 2020-11-27 ENCOUNTER — TELEPHONE (OUTPATIENT)
Dept: FAMILY MEDICINE | Facility: CLINIC | Age: 55
End: 2020-11-27

## 2020-11-27 DIAGNOSIS — R07.9 CHEST PAIN, UNSPECIFIED TYPE: ICD-10-CM

## 2020-11-27 DIAGNOSIS — E11.9 TYPE 2 DIABETES MELLITUS WITHOUT COMPLICATION, WITHOUT LONG-TERM CURRENT USE OF INSULIN (H): Primary | Chronic | ICD-10-CM

## 2020-11-27 DIAGNOSIS — I26.09 ACUTE COR PULMONALE (H): ICD-10-CM

## 2020-11-27 NOTE — TELEPHONE ENCOUNTER
Pt called and left a message on [facility] that she needs a refill on nitroglycern and Metformin  ,but do not see it on her list?    Please call pt to clarify

## 2020-11-27 NOTE — PROGRESS NOTES
Home care nurse calling to verify next INR date for Patient.  Zara Danielle, RN  Anticoagulation Nurse - [facility] [place]

## 2020-11-27 NOTE — PROGRESS NOTES
"Clinic Care Coordination Contact    Follow Up Progress Note      Assessment: Patient calling clinic care coordinator.     Patient states she had her stress test this morning. She states she mentioned to the nurse doing the test that she has \"chest pains, that last for a second or two and then they are gone.\" This happens off and on.  Nurse told her to call PCP and get an Rx for nitro.     Patient states she is constipated and wonders what she can take. Advised she use senna twice a day and then Miralax if not results from senna.     Patient wonders if she should be back on Metformin.   Blood sugar this am was 111. She states the highest she has had since returning home has been 152.  Advised she discuss this with PCP when she calls for Nitroglycerine request.     Patient states she is weighing herself daily, she has lost 7.2 pounds since returning home. Patient states she needs to be fitted for new compression wraps. Her elft leg continues to swell. Right leg looks better. Advised she talk to her home care nurse, they may be able to measure her and help her order them.     Patient states the home care nurse talked to her about getting set up for self INR checking. They are looking into this.     Patient states he insurance will not pay for cardiac rehab and home care at thei same time. We discussed this previously. Instructed patient to call cardiac rehab and reschedule her assessment for rehab after she completes home care.     Goals addressed this encounter:   Goals Addressed                 This Visit's Progress      #1 (pt-stated)   40%     Goal Statement: I want my shortness of breath to improve    Date goal set: 11/1/19    Measure of Success: When I am not short of breath    Barriers: COPD    Strengths: Motivated    Date to Achieve By: March 2021    Patient expressed understanding of goal: Yes    Action steps to achieve this goal    1. I will use nebulizer as instructed  2. I will see PCP  as scheduled  3. I " will use my asthma action plan         #3 Improve chronic symptoms (pt-stated)   50%     Goal Statement: I want my edema to improve.  Date goal set: 12/5/2019   Measure of Success: I will be able to put support stockings on by myself.  Barriers: lymphedema  Strengths: care coordination, lymphedema clinic  Date to Achieve By: December 2020  Patient expressed understanding of goal: yes    Action steps to achieve this goal  1. I will elevate my legs throughout the day  2. I will attend lymphedema clinic appointments  3. I will take my diuretics as prescribed         #4 Monitoring (pt-stated)   70%     Goal Statement: I will weigh myself every day.  Date goal set: 12/5/2019   Measure of Success: Consistent record of daily weights.  Barriers: Does not like scale  Strengths: care coordination support  Date to Achieve By: December 2020  Patient expressed understanding of goal: yes    Action steps to achieve this goal  1. I will weigh myself every morning.  2. I will write my weight down in a notebook.  3. I will take an extra 20-40 mg of Lasix if my weight increases by 3 lbs.           Intervention/Education provided during outreach: As above     Outreach Frequency: monthly    Plan:   Patient will reschedule cardiac/pukmonary rehab appointment to after completion of home care services.  Patient will works with home care nurse for compression wraps and possible home INT testing.  Patient will call PCP and discuss use of nitroglycerine and if she needs Metformin restarted.       Care Coordinator will follow up in one month.    Michela Whitaker RN, [facility] - Primary [facility] Clinic RN Coordinator  [facility]   11/27/2020    2:55 PM  [phone]

## 2020-11-30 RX ORDER — NITROGLYCERIN 0.4 MG/1
TABLET SUBLINGUAL
Qty: 15 TABLET | Refills: 0 | Status: SHIPPED | OUTPATIENT
Start: 2020-11-30

## 2020-11-30 NOTE — TELEPHONE ENCOUNTER
Dr. Mccall Have her male lab only appointment to shedule a1c,  ntg prescribed.  Cardiology referral place

## 2020-11-30 NOTE — TELEPHONE ENCOUNTER
Message left on patient's answering machine to call the [facility] RN back.  Mundo Conde RN

## 2020-11-30 NOTE — TELEPHONE ENCOUNTER
Norah notified to have a lab only appointment to get hgba1c checked and notified of the cardiology referral.  Mundo Conde RN

## 2020-11-30 NOTE — TELEPHONE ENCOUNTER
"Please see 11/27/20 \"Patient Outreach\" Clinic Care Coordination RN encounter and advise her request for nitroglycerin and metformin.  Thank you.   Mundo Conde RN    "

## 2020-12-02 ENCOUNTER — VIRTUAL VISIT (OUTPATIENT)
Dept: PSYCHOLOGY | Facility: CLINIC | Age: 55
End: 2020-12-02
Payer: COMMERCIAL

## 2020-12-02 DIAGNOSIS — F31.62 MODERATE MIXED BIPOLAR I DISORDER (H): Primary | ICD-10-CM

## 2020-12-02 PROCEDURE — 90834 PSYTX W PT 45 MINUTES: CPT | Mod: 95 | Performed by: PSYCHOLOGIST

## 2020-12-02 NOTE — PROGRESS NOTES
"                                           Progress Note  Disclaimer: Voice recognition software was used to generate this note. As a result, wrong word or 'sound-a-like' substitutions may have occurred due to the inherent limitations of voice recognition software. There may be errors in the script that have gone undetected. Please consider this when interpreting information found in this chart.     Patient Name: Bia Bill  Date: 12/2/2020         Service Type: Individual      Session Start Time: 12:00PM  session End Time: 12:45 PM  Session Length: 45    Session #: 67    Attendees: Client attended alone    Service Modality:  Phone Visit:    The patient has been notified of the following:      \"We have found that certain health care needs can be provided without the need for a face to face visit.  This service lets us provide the care you need with a phone conversation.       I will have full access to your [place] medical record during this entire phone call.   I will be taking notes for your medical record.      Since this is like an office visit, we will bill your insurance company for this service.       There are potential benefits and risks of telephone visits (e.g. limits to patient confidentiality) that differ from in-person visits.?  Confidentiality still applies for telephone services, and nobody will record the visit.  It is important to be in a quiet, private space that is free of distractions (including cell phone or other devices) during the visit.??      If during the course of the call I believe a telephone visit is not appropriate, you will not be charged for this service\"     Consent has been obtained for this service by care team member: Yes      Treatment Plan Last Reviewed: 7/24/2020  PHQ-9 / CHIN-7 : see flowsheets    DATA  Interactive Complexity: No    Crisis: No       Progress Since Last Session (Related to Symptoms / Goals / Homework):   Symptoms: No change stable    Homework: " Achieved / completed to satisfaction      Episode of Care Goals: Satisfactory progress - MAINTENANCE (Working to maintain change, with risk of relapse); Intervened by continuing to positively reinforce healthy behavior choice      Current / Ongoing Stressors and Concerns:   Longtime history of bipolar disorder. Family stress.   Client is recovering from congestive heart failure. Home care and nursing. Should be doing cardiac rehab...  Daughter wants her to move up to [place] to be closer to her. Worried it might be a bad situation for her        Treatment Objective(s) Addressed in This Session:   identify 2 strategies for more effectively managing Bipolar Disorder    Reinforcing positive steps taken for self care.  Encouraged care planning.     Intervention:   CBT: behavioral activation        ASSESSMENT: Current Emotional / Mental Status (status of significant symptoms):   Risk status (Self / Other harm or suicidal ideation)   Patient denies current fears or concerns for personal safety.   Patient denies current or recent suicidal ideation or behaviors.   Patient denies current or recent homicidal ideation or behaviors.   Patient denies current or recent self injurious behavior or ideation.   Patient denies other safety concerns.   Patient reports there has been no change in risk factors since their last session.     Patient reports there has been no change in protective factors since their last session.     Recommended that patient call 911 or go to the local ED should there be a change in any of these risk factors.     Appearance:   Unable to assess    Eye Contact:   Unable to assess    Psychomotor Behavior: Unable to assess    Attitude:   Cooperative    Orientation:   All   Speech    Rate / Production: Normal     Volume:  Normal    Mood:    Normal   Affect:    Appropriate    Thought Content:  Clear    Thought Form:  Coherent  Logical    Insight:    Good      Medication Review:   No changes to current  psychiatric medication(s)     Medication Compliance:   Yes     Changes in Health Issues:   None reported     Chemical Use Review:   Substance Use: Chemical use reviewed, no active concerns identified      Tobacco Use: No change in amount of tobacco use since last session.  Provided encouragement to quit     Diagnosis:  1. Moderate mixed bipolar I disorder (H)        Collateral Reports Completed:   Not Applicable    PLAN: (Patient Tasks / Therapist Tasks / Other)  Client to maintain boundaries with family. Designate a .        Kd Morris                                                         ______________________________________________________________________       Treatment Plan     Client's Name: Bia Bill               YOB: 1965     Date: 4/28/2020        DSM5 Diagnoses: (Sustained by DSM5 Criteria Listed Above)  Diagnoses: 296.40 Bipolar I Disorder Current or Most Recent Episode Manic, Unspecified  Psychosocial & Contextual Factors: financial difficulties, chronic pain, roommate issues  WHODAS 2.0 (12 item)               This questionnaire asks about difficulties due to health conditions. Health conditions             include disease or illnesses, other health problems that may be short or long lasting,             injuries, mental health or emotional problems, and problems with alcohol or drugs.                         Think back over the past 30 days and answer these questions, thinking about how much             difficulty you had doing the following activities. For each question, please [place] only             one response.      S1  Standing for long periods such as 30 minutes?  Mild =           2    S2  Taking care of household responsibilities?  Moderate =   3    S3  Learning a new task, for example, learning how to get to a new place?  Mild =           2    S4  How much of a problem do you have joining community activities (for example, festivals,  [employer] or other activities) in the same way as anyone else can?  Moderate =   3    S5  How much have you been emotionally affected by your health problems?  Mild =           2             In the past 30 days, how much difficulty did you have in:    S6  Concentrating on doing something for ten minutes?  Mild =           2    S7  Walking a long distance such as a kilometer (or equivalent)?  Severe =       4    S8  Washing your whole body?  None =         1    S9  Getting dressed?  None =         1    S10  Dealing with people you do not know?  Moderate =   3    S11  Maintaining a friendship?  Severe =       4    S12  Your day to day work?  Moderate =   3       H1  Overall, in the past 30 days, how many days were these difficulties present?  Record number of days seven    H2  In the past 30 days, for how many days were you totally unable to carry out your usual activities or work because of any health condition?  Record number of days  seven    H3  In the past 30 days, not counting the days that you were totally unable, for how many days did you cut back or reduce your usual activities or work because of any health condition?  Record number of days five                      Referral / Collaboration:  Referral to another professional/service is not indicated at this time..     Anticipated number of session or this episode of care: 60     Goals  1. Education- the Biopsychosocial model of depression  a. Client will be able to describe how depression is effecting them physically, emotionally and socially  2. Behavioral Activation  a. Client will learn to assess their depression on a day to day basis  b. Client will Identify two forms of exercise/activity and engage in them 3 times per week  c. Client will Identify 3 things that make them laugh, and engage in these 5 times per week.  d. Client will Identify 1-2Creative activities or hobbies  and engage in them 2 times per week  e. Client will identify music, movies, books  that make them feel good and use them 3-4 times per week  3. Self-care  a. Client will identify 5 things they can do just for themselves  b. Client will take time for quiet, reflection, meditation 5 times per week  c. Client will Learn to set boundaries when appropriate  d. Client will Identify 2 individuals they can call on for support, distraction  4. Assessment of progress  a. Client will engage in assessment of their progress on a regular basis     Bipolar Disorder  Treatment plan:  1. Education- the Biopsychosocial model of Bipolar Disorder    a. Client will be able to describe in general terms what Bipolar Disorder  is and is not  b. Client will be able to describe how Bipolar Disorder  has affected their life in at least two different areas, such as school or work and Home/relationships  c. Clients parents/guardians or significant others will be provided information on what Bipolar Disorder  is and the ways it can affect relationships and be encouraged to be a part of clients treatment team.  2. Medication  a. Client will participate in medication evaluation for Bipolar Disorder  symptoms and follow medication recommendations.   3. Identification and management of triggers  a. Client and therapist will examine patient s history to determine if there are predictable triggers for manic or depressive episodes (e.g. boredom, anger, family stress)  b. Client and therapist will develop means of diffusing these triggers (e.g. relaxation strategies, boundary setting, anger management)  4. Comorbid conditions   a. Client and therapist will asses for comorbid conditions (e.g. anxiety, depression, substance use). and add additional items to treatment plan as needed  5. Self-care  a. Client will identify 3 things they can do just for themselves  b. Client will take time for quiet, reflection, meditation 3 times per week  c. Client will Learn to set boundaries when appropriate  d. Client will Identify 2 individuals they  can call on for support, distraction  5. Behavioral Activation  a. Client will Identify two forms of exercise/activity and engage in them 3 times per week  b. Client will Identify 2 things that make them laugh, and engage in these 3 times per week.  c. Client will Identify 2 Creative activities or hobbies  and engage in them 2 times per week  d. Client will identify music, movies, books that make them feel good and use them 3 times per week  6. Assessment of progress  a. Client will engage in assessment of their progress on a regular basis     Client has reviewed and agreed to the above plan.        Kd Morris             7/24/2020

## 2020-12-04 ENCOUNTER — ANTICOAGULATION THERAPY VISIT (OUTPATIENT)
Dept: FAMILY MEDICINE | Facility: CLINIC | Age: 55
End: 2020-12-04

## 2020-12-04 DIAGNOSIS — D69.6 THROMBOCYTOPENIA (H): ICD-10-CM

## 2020-12-04 DIAGNOSIS — Z86.718 HISTORY OF DEEP VENOUS THROMBOSIS: ICD-10-CM

## 2020-12-04 LAB — INR PPP: 2.85 (ref 0.86–1.14)

## 2020-12-04 PROCEDURE — 85610 PROTHROMBIN TIME: CPT

## 2020-12-04 NOTE — PROGRESS NOTES
ANTICOAGULATION MANAGEMENT     Patient Name:  Bia Bill  Date:  2020    ASSESSMENT /SUBJECTIVE:    Today's INR result of 2.85 is therapeutic. Goal INR of 2.0-3.0      Warfarin dose taken: Warfarin taken as instructed    Diet: No new diet changes affecting INR    Medication changes/ interactions: No new medications/supplements affecting INR    Previous INR: Subtherapeutic     S/S of bleeding or thromboembolism: No    New injury or illness: No    Upcoming surgery, procedure or cardioversion: No    Additional findings: None      PLAN:    Telephone call with OMID Tanner RN    Warfarin Dosing Instructions: Continue your current warfarin dose 12.5 mg M; 10 mg ROW    Instructed patient to follow up no later than: 2 weeks  Home care appointment scheduled    Education provided: None required      Flores verbalizes understanding and agrees to warfarin dosing plan.    Instructed to call the Anticoagulation Clinic for any changes, questions or concerns. (#[phone])        Luz Elena Del Rosario RN      OBJECTIVE:  Recent labs: (last 7 days)     20  1051   INR 2.85*         INR assessment THER    Recheck INR In: 2 WEEKS    INR Location Homecare INR      Anticoagulation Summary  As of 2020    INR goal:  2.0-3.0   TTR:  82.2 % (11.7 mo)   INR used for dosin.85 (2020)   Warfarin maintenance plan:  12.5 mg (5 mg x 2.5) every Mon; 10 mg (5 mg x 2) all other days   Full warfarin instructions:  12.5 mg every Mon; 10 mg all other days   Weekly warfarin total:  72.5 mg   Plan last modified:  Carmen Way RN (2020)   Next INR check:  2020   Priority:  High   Target end date:  Indefinite    Indications    Long term current use of anticoagulant therapy (Resolved) [Z79.01]  History of deep venous thrombosis [Z86.718]  History of stroke symptoms  stroke ruled out on imaging (Resolved) [Z86.73]  Thrombocytopenia (H) [D69.6]             Anticoagulation Episode Summary     INR check location:       Preferred lab:      Send INR reminders to:  ANGI BHAKTA    Comments:  * lab draw due to elevated hematocrit (fingerstick meters don't work). Needs to have adjusted INR if hct over 55%. LEFT LE. STENT x 3 LEFT UPPER LEG. Previous arterial clot. AS of 10/9/19 Goal range 2.0-3.0.      Anticoagulation Care Providers     Provider Role Specialty Phone number    Kd Leong MD Referring [facility] [phone]

## 2020-12-07 DIAGNOSIS — J44.9 CHRONIC OBSTRUCTIVE PULMONARY DISEASE, UNSPECIFIED COPD TYPE (H): Chronic | ICD-10-CM

## 2020-12-07 DIAGNOSIS — E03.9 HYPOTHYROIDISM, UNSPECIFIED TYPE: Chronic | ICD-10-CM

## 2020-12-08 ENCOUNTER — PATIENT OUTREACH (OUTPATIENT)
Dept: NURSING | Facility: CLINIC | Age: 55
End: 2020-12-08
Payer: COMMERCIAL

## 2020-12-08 RX ORDER — IPRATROPIUM BROMIDE AND ALBUTEROL SULFATE 2.5; .5 MG/3ML; MG/3ML
SOLUTION RESPIRATORY (INHALATION)
Qty: 90 VIAL | Refills: 0 | Status: SHIPPED | OUTPATIENT
Start: 2020-12-08

## 2020-12-08 RX ORDER — LEVOTHYROXINE SODIUM 175 UG/1
TABLET ORAL
Qty: 90 TABLET | Refills: 0 | Status: SHIPPED | OUTPATIENT
Start: 2020-12-08 | End: 2021-03-03

## 2020-12-08 NOTE — PROGRESS NOTES
"Clinic Care Coordination Contact    Follow Up Progress Note      Assessment: Patient calling clinic care coordinator.     Patient was wondering what her appointment \"at the infusion clinic was for\"  This is for her lab draw and possible phlebotomy.      Patient states she is feeling better. Her breathing is good, she is not short of breath. She states she is trying to walk the length of her trailer frequently to help build some stamina as well.     Patient states her weight today was 217 pounds. This is down over thirty pounds. We discussed a lot of this was \"water weight\" due to CHF, but patient is watching her salt intake and trying to cut down on meals and snacking.  She is happy with this weight loss.    Patient states her legs look good. Home care gave her some mesh compression wraps and this has helped. Her legs feel good. We reviewed that patient does have an appointment with lymphedema clinic. She is trying to get this done at home by home care for at least a couple of visits.     Home care will be done in a week. Patient is scheduled for cardiac/pulmonary rehab evaluation on 12/17/20.  Encouraged patient to participate in this program.    Patient states she has stress in her life from her daughter, who causes a lot of drama. We discussed trying to not engage in the drama and walk away. Patient states she told her daughter that the stress she creates is affecting her mother's health.        Goals addressed this encounter: Improving  Goals Addressed                 This Visit's Progress      #1 (pt-stated)   50%     Goal Statement: I want my shortness of breath to improve    Date goal set: 11/1/19    Measure of Success: When I am not short of breath    Barriers: COPD    Strengths: Motivated    Date to Achieve By: March 2021    Patient expressed understanding of goal: Yes    Action steps to achieve this goal    1. I will use nebulizer as instructed  2. I will see PCP  as scheduled  3. I will use my asthma " action plan         Intervention/Education provided during outreach: Positive reinforcement, encouragement     Outreach Frequency: monthly    Plan:   Patient will keep scheduled appointments    Care Coordinator will follow up in one month.    Michela Whitaker RN, [facility] - Primary Care Clinic RN Coordinator  [facility]   12/8/2020    2:04 PM  [phone]

## 2020-12-08 NOTE — TELEPHONE ENCOUNTER
Medication is being filled for 1 time refill only due to:  Patient needs to be seen because needs follow up and ACT.   Mundo Conde RN

## 2020-12-10 ENCOUNTER — VIRTUAL VISIT (OUTPATIENT)
Dept: PSYCHOLOGY | Facility: CLINIC | Age: 55
End: 2020-12-10
Payer: COMMERCIAL

## 2020-12-10 DIAGNOSIS — F31.62 MODERATE MIXED BIPOLAR I DISORDER (H): Primary | ICD-10-CM

## 2020-12-10 PROCEDURE — 90832 PSYTX W PT 30 MINUTES: CPT | Mod: 95 | Performed by: PSYCHOLOGIST

## 2020-12-10 NOTE — PROGRESS NOTES
"                                           Progress Note  Disclaimer: Voice recognition software was used to generate this note. As a result, wrong word or 'sound-a-like' substitutions may have occurred due to the inherent limitations of voice recognition software. There may be errors in the script that have gone undetected. Please consider this when interpreting information found in this chart.     Patient Name: Bia Bill  Date: 12/10/2020         Service Type: Individual      Session Start Time: 11:00PM  session End Time: 11:30 PM  Session Length: 30    Session #: 68    Attendees: Client attended alone    Service Modality:  Phone Visit:    The patient has been notified of the following:      \"We have found that certain health care needs can be provided without the need for a face to face visit.  This service lets us provide the care you need with a phone conversation.       I will have full access to your [facility] medical record during this entire phone call.   I will be taking notes for your medical record.      Since this is like an office visit, we will bill your insurance company for this service.       There are potential benefits and risks of telephone visits (e.g. limits to patient confidentiality) that differ from in-person visits.?  Confidentiality still applies for telephone services, and nobody will record the visit.  It is important to be in a quiet, private space that is free of distractions (including cell phone or other devices) during the visit.??      If during the course of the call I believe a telephone visit is not appropriate, you will not be charged for this service\"     Consent has been obtained for this service by care team member: Yes      Treatment Plan Last Reviewed: 7/24/2020  PHQ-9 / CHIN-7 : see flowsheets    DATA  Interactive Complexity: No    Crisis: No       Progress Since Last Session (Related to Symptoms / Goals / Homework):   Symptoms: Improving Exercise and medications " helping.    Homework: Achieved / completed to satisfaction      Episode of Care Goals: Satisfactory progress - MAINTENANCE (Working to maintain change, with risk of relapse); Intervened by continuing to positively reinforce healthy behavior choice      Current / Ongoing Stressors and Concerns:   Longtime history of bipolar disorder. Family stress.   Client is recovering from congestive heart failure. Home care and nursing.     She has nitroglycerine for angina, helps her feel more comfortable. Will be finishing rehab soon. Notes family stress increases chest pain. Breathing techniques help greatly.      Treatment Objective(s) Addressed in This Session:   identify 2 strategies for more effectively managing Bipolar Disorder    Reinforcing positive steps taken for self care.       Intervention:   CBT: behavioral activation        ASSESSMENT: Current Emotional / Mental Status (status of significant symptoms):   Risk status (Self / Other harm or suicidal ideation)   Patient denies current fears or concerns for personal safety.   Patient denies current or recent suicidal ideation or behaviors.   Patient denies current or recent homicidal ideation or behaviors.   Patient denies current or recent self injurious behavior or ideation.   Patient denies other safety concerns.   Patient reports there has been no change in risk factors since their last session.     Patient reports there has been no change in protective factors since their last session.     Recommended that patient call 911 or go to the local ED should there be a change in any of these risk factors.     Appearance:   Unable to assess    Eye Contact:   Unable to assess    Psychomotor Behavior: Unable to assess    Attitude:   Cooperative    Orientation:   All   Speech    Rate / Production: Normal     Volume:  Normal    Mood:    Normal   Affect:    Appropriate    Thought Content:  Clear    Thought Form:  Coherent  Logical    Insight:    Good      Medication  Review:   No changes to current psychiatric medication(s)     Medication Compliance:   Yes     Changes in Health Issues:   None reported     Chemical Use Review:   Substance Use: Chemical use reviewed, no active concerns identified      Tobacco Use: No change in amount of tobacco use since last session.  Provided encouragement to quit     Diagnosis:  1. Moderate mixed bipolar I disorder (H)        Collateral Reports Completed:   Not Applicable    PLAN: (Patient Tasks / Therapist Tasks / Other)  Client to maintain boundaries with family. Designate a .        Kd Morris                                                         ______________________________________________________________________       Treatment Plan     Client's Name: Bia Bill               YOB: 1965     Date: 4/28/2020        DSM5 Diagnoses: (Sustained by DSM5 Criteria Listed Above)  Diagnoses: 296.40 Bipolar I Disorder Current or Most Recent Episode Manic, Unspecified  Psychosocial & Contextual Factors: financial difficulties, chronic pain, roommate issues  WHODAS 2.0 (12 item)               This questionnaire asks about difficulties due to health conditions. Health conditions             include disease or illnesses, other health problems that may be short or long lasting,             injuries, mental health or emotional problems, and problems with alcohol or drugs.                         Think back over the past 30 days and answer these questions, thinking about how much             difficulty you had doing the following activities. For each question, please [place] only             one response.      S1  Standing for long periods such as 30 minutes?  Mild =           2    S2  Taking care of household responsibilities?  Moderate =   3    S3  Learning a new task, for example, learning how to get to a new place?  Mild =           2    S4  How much of a problem do you have joining community activities  (for example, festivals, [employer] or other activities) in the same way as anyone else can?  Moderate =   3    S5  How much have you been emotionally affected by your health problems?  Mild =           2             In the past 30 days, how much difficulty did you have in:    S6  Concentrating on doing something for ten minutes?  Mild =           2    S7  Walking a long distance such as a kilometer (or equivalent)?  Severe =       4    S8  Washing your whole body?  None =         1    S9  Getting dressed?  None =         1    S10  Dealing with people you do not know?  Moderate =   3    S11  Maintaining a friendship?  Severe =       4    S12  Your day to day work?  Moderate =   3       H1  Overall, in the past 30 days, how many days were these difficulties present?  Record number of days seven    H2  In the past 30 days, for how many days were you totally unable to carry out your usual activities or work because of any health condition?  Record number of days  seven    H3  In the past 30 days, not counting the days that you were totally unable, for how many days did you cut back or reduce your usual activities or work because of any health condition?  Record number of days five                      Referral / Collaboration:  Referral to another professional/service is not indicated at this time..     Anticipated number of session or this episode of care: 60     Goals  1. Education- the Biopsychosocial model of depression  a. Client will be able to describe how depression is effecting them physically, emotionally and socially  2. Behavioral Activation  a. Client will learn to assess their depression on a day to day basis  b. Client will Identify two forms of exercise/activity and engage in them 3 times per week  c. Client will Identify 3 things that make them laugh, and engage in these 5 times per week.  d. Client will Identify 1-2Creative activities or hobbies  and engage in them 2 times per week  e. Client will  identify music, movies, books that make them feel good and use them 3-4 times per week  3. Self-care  a. Client will identify 5 things they can do just for themselves  b. Client will take time for quiet, reflection, meditation 5 times per week  c. Client will Learn to set boundaries when appropriate  d. Client will Identify 2 individuals they can call on for support, distraction  4. Assessment of progress  a. Client will engage in assessment of their progress on a regular basis     Bipolar Disorder  Treatment plan:  1. Education- the Biopsychosocial model of Bipolar Disorder    a. Client will be able to describe in general terms what Bipolar Disorder  is and is not  b. Client will be able to describe how Bipolar Disorder  has affected their life in at least two different areas, such as school or work and Home/relationships  c. Clients parents/guardians or significant others will be provided information on what Bipolar Disorder  is and the ways it can affect relationships and be encouraged to be a part of clients treatment team.  2. Medication  a. Client will participate in medication evaluation for Bipolar Disorder  symptoms and follow medication recommendations.   3. Identification and management of triggers  a. Client and therapist will examine patient s history to determine if there are predictable triggers for manic or depressive episodes (e.g. boredom, anger, family stress)  b. Client and therapist will develop means of diffusing these triggers (e.g. relaxation strategies, boundary setting, anger management)  4. Comorbid conditions   a. Client and therapist will asses for comorbid conditions (e.g. anxiety, depression, substance use). and add additional items to treatment plan as needed  5. Self-care  a. Client will identify 3 things they can do just for themselves  b. Client will take time for quiet, reflection, meditation 3 times per week  c. Client will Learn to set boundaries when appropriate  d. Client will  Identify 2 individuals they can call on for support, distraction  5. Behavioral Activation  a. Client will Identify two forms of exercise/activity and engage in them 3 times per week  b. Client will Identify 2 things that make them laugh, and engage in these 3 times per week.  c. Client will Identify 2 Creative activities or hobbies  and engage in them 2 times per week  d. Client will identify music, movies, books that make them feel good and use them 3 times per week  6. Assessment of progress  a. Client will engage in assessment of their progress on a regular basis     Client has reviewed and agreed to the above plan.        Kd Morris             7/24/2020

## 2020-12-11 ENCOUNTER — INFUSION THERAPY VISIT (OUTPATIENT)
Dept: INFUSION THERAPY | Facility: CLINIC | Age: 55
End: 2020-12-11
Attending: INTERNAL MEDICINE
Payer: COMMERCIAL

## 2020-12-11 ENCOUNTER — HOSPITAL ENCOUNTER (OUTPATIENT)
Dept: LAB | Facility: CLINIC | Age: 55
Discharge: HOME | End: 2020-12-11
Attending: INTERNAL MEDICINE | Admitting: FAMILY MEDICINE
Payer: COMMERCIAL

## 2020-12-11 ENCOUNTER — ANTICOAGULATION THERAPY VISIT (OUTPATIENT)
Dept: FAMILY MEDICINE | Facility: CLINIC | Age: 55
End: 2020-12-11

## 2020-12-11 VITALS — TEMPERATURE: 97.2 F | DIASTOLIC BLOOD PRESSURE: 55 MMHG | HEART RATE: 78 BPM | SYSTOLIC BLOOD PRESSURE: 118 MMHG

## 2020-12-11 DIAGNOSIS — D75.1 ERYTHROCYTOSIS: ICD-10-CM

## 2020-12-11 DIAGNOSIS — I82.409 DVT (DEEP VENOUS THROMBOSIS) (H): ICD-10-CM

## 2020-12-11 DIAGNOSIS — I82.409 DEEP PHLEBOTHROMBOSIS, ANTEPARTUM, WITH DELIVERY (H): ICD-10-CM

## 2020-12-11 DIAGNOSIS — I73.9 PVD (PERIPHERAL VASCULAR DISEASE) WITH CLAUDICATION (H): ICD-10-CM

## 2020-12-11 DIAGNOSIS — Z86.718 HISTORY OF DEEP VENOUS THROMBOSIS: ICD-10-CM

## 2020-12-11 DIAGNOSIS — Z86.73 HISTORY OF STROKE: ICD-10-CM

## 2020-12-11 DIAGNOSIS — D69.6 THROMBOCYTOPENIA (H): ICD-10-CM

## 2020-12-11 DIAGNOSIS — O22.30 DEEP PHLEBOTHROMBOSIS, ANTEPARTUM, WITH DELIVERY (H): ICD-10-CM

## 2020-12-11 DIAGNOSIS — E11.9 TYPE 2 DIABETES MELLITUS WITHOUT COMPLICATION, WITHOUT LONG-TERM CURRENT USE OF INSULIN (H): Chronic | ICD-10-CM

## 2020-12-11 DIAGNOSIS — Z79.01 LONG TERM (CURRENT) USE OF ANTICOAGULANTS: ICD-10-CM

## 2020-12-11 LAB
BASOPHILS # BLD AUTO: 0 10E9/L (ref 0–0.2)
BASOPHILS NFR BLD AUTO: 0.5 %
DIFFERENTIAL METHOD BLD: ABNORMAL
EOSINOPHIL # BLD AUTO: 0 10E9/L (ref 0–0.7)
EOSINOPHIL NFR BLD AUTO: 1 %
ERYTHROCYTE [DISTWIDTH] IN BLOOD BY AUTOMATED COUNT: 22 % (ref 10–15)
HCT VFR BLD AUTO: 45.6 % (ref 35–47)
HGB BLD-MCNC: 12.8 G/DL (ref 11.7–15.7)
IMM GRANULOCYTES # BLD: 0 10E9/L (ref 0–0.4)
IMM GRANULOCYTES NFR BLD: 0 %
INR PPP: 3.7 (ref 0.86–1.14)
LYMPHOCYTES # BLD AUTO: 1 10E9/L (ref 0.8–5.3)
LYMPHOCYTES NFR BLD AUTO: 25.8 %
MCH RBC QN AUTO: 23.8 PG (ref 26.5–33)
MCHC RBC AUTO-ENTMCNC: 28.1 G/DL (ref 31.5–36.5)
MCV RBC AUTO: 85 FL (ref 78–100)
MONOCYTES # BLD AUTO: 0.3 10E9/L (ref 0–1.3)
MONOCYTES NFR BLD AUTO: 6.8 %
NEUTROPHILS # BLD AUTO: 2.5 10E9/L (ref 1.6–8.3)
NEUTROPHILS NFR BLD AUTO: 65.9 %
NRBC # BLD AUTO: 0 10*3/UL
NRBC BLD AUTO-RTO: 0 /100
PLATELET # BLD AUTO: 121 10E9/L (ref 150–450)
RBC # BLD AUTO: 5.37 10E12/L (ref 3.8–5.2)
WBC # BLD AUTO: 3.8 10E9/L (ref 4–11)

## 2020-12-11 PROCEDURE — 85610 PROTHROMBIN TIME: CPT | Performed by: FAMILY MEDICINE

## 2020-12-11 PROCEDURE — 85025 COMPLETE CBC W/AUTO DIFF WBC: CPT | Performed by: FAMILY MEDICINE

## 2020-12-11 PROCEDURE — 99195 PHLEBOTOMY: CPT

## 2020-12-11 PROCEDURE — 36415 COLL VENOUS BLD VENIPUNCTURE: CPT | Performed by: FAMILY MEDICINE

## 2020-12-11 NOTE — PROGRESS NOTES
Anticoagulation Management    Unable to reach [facility] at this time.    Today's INR result of 3.7 is supratherapeutic (goal INR of 2.0-3.0).  Result received from: Clinic Lab    Follow up required to confirm warfarin dose taken and assess for changes    INR from infusion therapy; has INR recheck already scheduled with  on 12/18/20.    [facility] for the patient requesting call back for dosing and recheck instructions. Malaika Laguna RN December 11, 2020 11:13 AM

## 2020-12-11 NOTE — PROGRESS NOTES
ANTICOAGULATION MANAGEMENT     Patient Name:  Bia Bill  Date:  12/14/2020    ASSESSMENT /SUBJECTIVE:    Today's INR result of 3.7 is supratherapeutic. Goal INR of 2.0-3.0      Warfarin dose taken: Warfarin taken as instructed    Diet: No new diet changes affecting INR    Medication changes/ interactions: No new medications/supplements affecting INR    Previous INR: Therapeutic     S/S of bleeding or thromboembolism: No    New injury or illness: No    Upcoming surgery, procedure or cardioversion: No    Additional findings: chemotherapy      PLAN:    Telephone call with Bia regarding INR result and instructed:     Warfarin Dosing Instructions: 7.5 mg tonight then continue your current warfarin dose of 12.5 mg M; 10 mg ROW    Instructed patient to follow up no later than: 3 days to coincide with appt at clinic  Lab visit scheduled    Education provided: Monitoring for bleeding signs and symptoms and When to seek medical attention/emergency care      Norah verbalizes understanding and agrees to warfarin dosing plan.    Instructed to call the Anticoagulation Clinic for any changes, questions or concerns. (#[phone])        Luz Elena Del Rosario RN      OBJECTIVE:  Recent labs: (last 7 days)     12/11/20  0932   INR 3.70*         INR assessment SUPRA    Recheck INR In: 1 WEEK    INR Location [facility]      Anticoagulation Summary  As of 12/11/2020    INR goal:  2.0-3.0   TTR:  82.5 % (11.7 mo)   INR used for dosing:  3.70 (12/11/2020)   Warfarin maintenance plan:  12.5 mg (5 mg x 2.5) every Mon; 10 mg (5 mg x 2) all other days   Full warfarin instructions:  12/14: 7.5 mg; Otherwise 12.5 mg every Mon; 10 mg all other days   Weekly warfarin total:  72.5 mg   Plan last modified:  Carmen Way, RN (6/19/2020)   Next INR check:  12/18/2020   Priority:  High   Target end date:  Indefinite    Indications    Long term current use of anticoagulant therapy (Resolved) [Z79.01]  History of deep venous  thrombosis [Z86.718]  History of stroke symptoms  stroke ruled out on imaging (Resolved) [Z86.73]  Thrombocytopenia (H) [D69.6]             Anticoagulation Episode Summary     INR check location:      Preferred lab:      Send INR reminders to:  ANGI BHAKTA    Comments:  * lab draw due to elevated hematocrit (fingerstick meters don't work). Needs to have adjusted INR if hct over 55%. LEFT LE. STENT x 3 LEFT UPPER LEG. Previous arterial clot. AS of 10/9/19 Goal range 2.0-3.0.      Anticoagulation Care Providers     Provider Role Specialty Phone number    Kd Leong MD Referring [facility] [phone]       [facility] for the patient.  Results reviewed; dosing and recheck instructions for the weekend provided.     Requesting call back on Monday 12/14/20 to set up next appointment and report any missed or extra doses, changes in diet,health or medications, or any bleeding or clotting episodes.  Malaika Laguna RN December 11, 2020 5:41 PM

## 2020-12-11 NOTE — PROGRESS NOTES
Infusion Nursing Note:  Bia Bill presents today for phlebotomy.    Patient seen by provider today: No   present during visit today: Not Applicable.    Note: Labs drawn peripherally.    Intravenous Access:  16 gauge phleb needle.    Treatment Conditions:  Lab Results   Component Value Date    HGB 12.8 12/11/2020     Lab Results   Component Value Date    WBC 3.8 12/11/2020      Lab Results   Component Value Date    ANEU 2.5 12/11/2020     Lab Results   Component Value Date     12/11/2020      Results reviewed, labs MET treatment parameters, ok to proceed with treatment.      Post Infusion Assessment:  Site patent and intact, free from redness, edema or discomfort.  No evidence of extravasations.  Access discontinued per protocol.  B/P stable pre & post phleb.   500mL whole blood collected      Discharge Plan:   Patient discharged in stable condition accompanied by: self.  Departure Mode: Ambulatory.    Wayne Jernigan RN

## 2020-12-13 DIAGNOSIS — Z53.9 DIAGNOSIS NOT YET DEFINED: Primary | ICD-10-CM

## 2020-12-13 PROCEDURE — 99207 PR MD CERTIFICATION HHA PATIENT: CPT | Performed by: FAMILY MEDICINE

## 2020-12-14 ENCOUNTER — TRANSFERRED RECORDS (OUTPATIENT)
Dept: HEALTH INFORMATION MANAGEMENT | Facility: CLINIC | Age: 55
End: 2020-12-14

## 2020-12-14 LAB — RETINOPATHY: POSITIVE

## 2020-12-17 ENCOUNTER — ANTICOAGULATION THERAPY VISIT (OUTPATIENT)
Dept: FAMILY MEDICINE | Facility: CLINIC | Age: 55
End: 2020-12-17

## 2020-12-17 ENCOUNTER — HOSPITAL ENCOUNTER (OUTPATIENT)
Dept: CARDIAC REHAB | Facility: CLINIC | Age: 55
End: 2020-12-17
Attending: FAMILY MEDICINE
Payer: COMMERCIAL

## 2020-12-17 DIAGNOSIS — D75.1 ERYTHROCYTOSIS: ICD-10-CM

## 2020-12-17 DIAGNOSIS — D69.6 THROMBOCYTOPENIA (H): ICD-10-CM

## 2020-12-17 DIAGNOSIS — Z86.73 HISTORY OF STROKE: ICD-10-CM

## 2020-12-17 DIAGNOSIS — I73.9 PVD (PERIPHERAL VASCULAR DISEASE) WITH CLAUDICATION (H): ICD-10-CM

## 2020-12-17 DIAGNOSIS — I27.20 PULMONARY HYPERTENSION (H): ICD-10-CM

## 2020-12-17 DIAGNOSIS — I82.409 DVT (DEEP VENOUS THROMBOSIS) (H): ICD-10-CM

## 2020-12-17 DIAGNOSIS — Z86.718 HISTORY OF DEEP VENOUS THROMBOSIS: ICD-10-CM

## 2020-12-17 LAB
BASOPHILS # BLD AUTO: 0 10E9/L (ref 0–0.2)
BASOPHILS NFR BLD AUTO: 0.2 %
DIFFERENTIAL METHOD BLD: ABNORMAL
EOSINOPHIL # BLD AUTO: 0.1 10E9/L (ref 0–0.7)
EOSINOPHIL NFR BLD AUTO: 1.1 %
ERYTHROCYTE [DISTWIDTH] IN BLOOD BY AUTOMATED COUNT: 21.5 % (ref 10–15)
HCT VFR BLD AUTO: 41.2 % (ref 35–47)
HGB BLD-MCNC: 11.6 G/DL (ref 11.7–15.7)
INR PPP: 4.16 (ref 0.86–1.14)
LYMPHOCYTES # BLD AUTO: 1 10E9/L (ref 0.8–5.3)
LYMPHOCYTES NFR BLD AUTO: 20.9 %
MCH RBC QN AUTO: 24 PG (ref 26.5–33)
MCHC RBC AUTO-ENTMCNC: 28.2 G/DL (ref 31.5–36.5)
MCV RBC AUTO: 85 FL (ref 78–100)
MONOCYTES # BLD AUTO: 0.4 10E9/L (ref 0–1.3)
MONOCYTES NFR BLD AUTO: 7.8 %
NEUTROPHILS # BLD AUTO: 3.2 10E9/L (ref 1.6–8.3)
NEUTROPHILS NFR BLD AUTO: 70 %
PLATELET # BLD AUTO: 111 10E9/L (ref 150–450)
RBC # BLD AUTO: 4.84 10E12/L (ref 3.8–5.2)
WBC # BLD AUTO: 4.6 10E9/L (ref 4–11)

## 2020-12-17 PROCEDURE — 85025 COMPLETE CBC W/AUTO DIFF WBC: CPT | Performed by: INTERNAL MEDICINE

## 2020-12-17 PROCEDURE — 999N000193 HC STATISTIC VISIT PULM REHAB: Performed by: REHABILITATION PRACTITIONER

## 2020-12-17 PROCEDURE — 85610 PROTHROMBIN TIME: CPT | Performed by: FAMILY MEDICINE

## 2020-12-17 PROCEDURE — G0237 THERAPEUTIC PROCD STRG ENDUR: HCPCS | Performed by: REHABILITATION PRACTITIONER

## 2020-12-17 PROCEDURE — 36415 COLL VENOUS BLD VENIPUNCTURE: CPT | Performed by: INTERNAL MEDICINE

## 2020-12-17 PROCEDURE — G0238 OTH RESP PROC, INDIV: HCPCS | Performed by: REHABILITATION PRACTITIONER

## 2020-12-17 NOTE — PROGRESS NOTES
ANTICOAGULATION MANAGEMENT     Patient Name:  Bia Bill  Date:  2020    ASSESSMENT /SUBJECTIVE:    Today's INR result of 4.16 is supratherapeutic. Goal INR of 2.0-3.0      Warfarin dose taken: Warfarin taken as instructed    Diet: No new diet changes affecting INR    Medication changes/ interactions: Increase in lasixs to 60 mg AM and 40 mg pm    Previous INR: Supratherapeutic     S/S of bleeding or thromboembolism: No    New injury or illness: No    Upcoming surgery, procedure or cardioversion: No    Additional findings: Lymphoedema treatments; hematocrit is 44.2%; Lost 40 pounds.  Patient has INRs done in clinic so will transfer [facility] care back to Clinic [facility].       PLAN:    Telephone call with Bia regarding INR result and instructed:     Warfarin Dosing Instructions: Hold x 1 (Patient's INR Drops quick with 2 day hold), then 10 mg daily    Instructed patient to follow up no later than: 5 days  Lab visit scheduled    Education provided: Monitoring for bleeding signs and symptoms and Monitoring for clotting signs and symptoms      Norah verbalizes understanding and agrees to warfarin dosing plan.    Instructed to call the Anticoagulation Clinic for any changes, questions or concerns. (#[phone])        Zara Danielle RN      OBJECTIVE:  Recent labs: (last 7 days)     20  0932 20  0910   INR 3.70* 4.16*         No question data found.  Anticoagulation Summary  As of 2020    INR goal:  2.0-3.0   TTR:  80.8 % (11.7 mo)   INR used for dosin.16 (2020)   Warfarin maintenance plan:  12.5 mg (5 mg x 2.5) every Mon; 10 mg (5 mg x 2) all other days   Full warfarin instructions:  : Hold; : 10 mg; Otherwise 12.5 mg every Mon; 10 mg all other days   Weekly warfarin total:  72.5 mg   Plan last modified:  Carmen Way, RN (2020)   Next INR check:  2020   Priority:  High   Target end date:  Indefinite    Indications    Long term current use of  anticoagulant therapy (Resolved) [Z79.01]  History of deep venous thrombosis [Z86.718]  History of stroke symptoms  stroke ruled out on imaging (Resolved) [Z86.73]  Thrombocytopenia (H) [D69.6]             Anticoagulation Episode Summary     INR check location:      Preferred lab:  [facility]    Send INR reminders to:  ANGI HENRY    Comments:  * lab draw due to elevated hematocrit (fingerstick meters don't work). Needs to have adjusted INR if hct over 55%. LEFT LE. STENT x 3 LEFT UPPER LEG. Previous arterial clot. AS of 10/9/19 Goal range 2.0-3.0. // Patient can be managed by [facility] clinic      Anticoagulation Care Providers     Provider Role Specialty Phone number    Kd Leong MD Referring Family Practice [phone]

## 2020-12-22 ENCOUNTER — ANTICOAGULATION THERAPY VISIT (OUTPATIENT)
Dept: ANTICOAGULATION | Facility: CLINIC | Age: 55
End: 2020-12-22

## 2020-12-22 ENCOUNTER — HOSPITAL ENCOUNTER (OUTPATIENT)
Dept: PHYSICAL THERAPY | Facility: CLINIC | Age: 55
Setting detail: THERAPIES SERIES
End: 2020-12-22
Attending: FAMILY MEDICINE
Payer: COMMERCIAL

## 2020-12-22 DIAGNOSIS — O22.30 DEEP PHLEBOTHROMBOSIS, ANTEPARTUM, WITH DELIVERY (H): ICD-10-CM

## 2020-12-22 DIAGNOSIS — I73.9 PVD (PERIPHERAL VASCULAR DISEASE) WITH CLAUDICATION (H): ICD-10-CM

## 2020-12-22 DIAGNOSIS — Z86.73 HISTORY OF STROKE: ICD-10-CM

## 2020-12-22 DIAGNOSIS — I82.409 DEEP PHLEBOTHROMBOSIS, ANTEPARTUM, WITH DELIVERY (H): ICD-10-CM

## 2020-12-22 DIAGNOSIS — D69.6 THROMBOCYTOPENIA (H): ICD-10-CM

## 2020-12-22 DIAGNOSIS — Z79.01 LONG TERM (CURRENT) USE OF ANTICOAGULANTS: ICD-10-CM

## 2020-12-22 DIAGNOSIS — Z86.718 HISTORY OF DEEP VENOUS THROMBOSIS: ICD-10-CM

## 2020-12-22 DIAGNOSIS — I82.409 DVT (DEEP VENOUS THROMBOSIS) (H): ICD-10-CM

## 2020-12-22 DIAGNOSIS — I89.0 LYMPHEDEMA OF BOTH LOWER EXTREMITIES: ICD-10-CM

## 2020-12-22 LAB
HCT VFR BLD AUTO: 41.6 % (ref 35–47)
INR PPP: 3.33 (ref 0.86–1.14)

## 2020-12-22 PROCEDURE — 97140 MANUAL THERAPY 1/> REGIONS: CPT | Mod: GP | Performed by: PHYSICAL THERAPIST

## 2020-12-22 PROCEDURE — 97161 PT EVAL LOW COMPLEX 20 MIN: CPT | Mod: GP | Performed by: PHYSICAL THERAPIST

## 2020-12-22 PROCEDURE — 99207 PR NO CHARGE NURSE ONLY: CPT

## 2020-12-22 PROCEDURE — 36415 COLL VENOUS BLD VENIPUNCTURE: CPT | Performed by: FAMILY MEDICINE

## 2020-12-22 PROCEDURE — 85610 PROTHROMBIN TIME: CPT | Performed by: FAMILY MEDICINE

## 2020-12-22 PROCEDURE — 85014 HEMATOCRIT: CPT | Performed by: FAMILY MEDICINE

## 2020-12-22 NOTE — PROGRESS NOTES
ANTICOAGULATION FOLLOW-UP CLINIC VISIT    Patient Name:  Bia Bill  Date:  12/22/2020  Contact Type:  Telephone    SUBJECTIVE:  Patient Findings     Positives:  Change in health (LE edema)    Comments:  Patient started lymphedema therapy today. Per patient, her LE edema has gotten worse. No other changes. Due to last 3 INRs being elevated, will decrease weekly dose by 10% and recheck in 1 week. No other concerns.         Clinical Outcomes     Comments:  Patient started lymphedema therapy today. Per patient, her LE edema has gotten worse. No other changes. Due to last 3 INRs being elevated, will decrease weekly dose by 10% and recheck in 1 week. No other concerns.            OBJECTIVE    Recent labs: (last 7 days)     12/22/20  0943   INR 3.33*       ASSESSMENT / PLAN  INR assessment SUPRA    Recheck INR In: 1 WEEK    INR Location Clinic      Anticoagulation Summary  As of 12/22/2020    INR goal:  2.0-3.0   TTR:  79.4 % (11.7 mo)   INR used for dosing:  3.33 (12/22/2020)   Warfarin maintenance plan:  7.5 mg (5 mg x 1.5) every Tue, Fri; 10 mg (5 mg x 2) all other days   Full warfarin instructions:  7.5 mg every Tue, Fri; 10 mg all other days   Weekly warfarin total:  65 mg   Plan last modified:  Mireille Torres RN (12/22/2020)   Next INR check:  12/29/2020   Priority:  High   Target end date:  Indefinite    Indications    Long term current use of anticoagulant therapy (Resolved) [Z79.01]  History of deep venous thrombosis [Z86.718]  History of stroke symptoms  stroke ruled out on imaging (Resolved) [Z86.73]  Thrombocytopenia (H) [D69.6]             Anticoagulation Episode Summary     INR check location:      Preferred lab:  [facility]    Send INR reminders to:  ANGI HENRY    Comments:  * lab draw due to elevated hematocrit (fingerstick meters don't work). Needs to have adjusted INR if hct over 55%. LEFT LE. STENT x 3 LEFT UPPER LEG. Previous arterial clot. AS of 10/9/19 Goal range 2.0-3.0.  // Patient can be managed by [facility] clinic      Anticoagulation Care Providers     Provider Role Specialty Phone number    Kd Leong MD Referring Family Practice [phone]            See the Encounter Report to view Anticoagulation Flowsheet and Dosing Calendar (Go to Encounters tab in chart review, and find the Anticoagulation Therapy Visit)        Mireille Torres RN

## 2020-12-22 NOTE — PROGRESS NOTES
Outpatient Lymphedema Therapy Evaluation       12/22/20 0800   Rehab Discipline   Discipline PT   Type of Visit   Type of visit Initial Edema Evaluation       present No   General Information   Start of care 12/22/20   Referring physician Dr. Kd Leong MD   Orders Evaluate and treat as indicated   Order date 12/22/20   Medical diagnosis BLE secondary lymphedema, chronic   Edema onset   (12/22/2020 - date of referral)   Affected body parts RLE;LLE   Edema etiology comments chronic LE edema, history of DVT when pregnant (1992) and again in 2004 which required hospitalization and intervetion (at [facility]); pt reports newly diagnosed CHF (~1 month ago)   Pertinent history of current problem (PT: include personal factors and/or comorbidities that impact the POC; OT: include additional occupational profile info) pt last seen at this OP lymphedema clinic ~1 year ago and at that time was discharged wearing Compreflex Lite, medium regular; pt reports her edema is best in the morning and worsens with time on feet; pt currently doing pulmonary rehab too; pt reports really trying to keep her legs elevated more; pt reports losing 27# over the past year and now her velcro wraps are too big   Surgical / medical history reviewed Yes   Edema special tests   (history of DVT  - see above etiology comments)   Prior level of functional mobility independent ambulation into clinic today   Prior treatment Compression garments;Exercise;Diuretics;ACE wraps;Gradient compression bandaging   Patient role / employment history Disabled   Living environment House / townhome   Living environment comments lives with a roommate   Assistive device comments none today - pt reports her cane broke and her walker is buried at home; stable on feet   Fall Risk Screen   Fall screen completed by PT   Have you fallen 2 or more times in the past year? No   Have you fallen and had an injury in the past year? No   Is patient a fall  risk? No   Abuse Screen (yes response referral indicated)   Feels Unsafe at Home or Work/School no   Feels Threatened by Someone no   Does Anyone Try to Keep You From Having Contact with Others or Doing Things Outside Your Home? no   Physical Signs of Abuse Present no   System Outcome Measures   Outcome Measures Lymphedema   Lymphedema Life Impact Scale (score range 0-72). A higher score indicates greater impairment. 20   Subjective Report   Patient report of symptoms legs are a little painful, heavy and tight - more in the evening than in the morning   Precautions   Precautions comments no known precautions   Patient / Family Goals   Patient / family goals statement to get treatment or new garments, whatever you think   Pain   Patient currently in pain No   Cognitive Status   Orientation Orientation to person, place and time   Level of consciousness Alert   Follows commands and answers questions 100% of the time   Personal safety and judgement Intact   Memory Intact   Edema Exam / Assessment   Skin condition Dryness   Skin condition comments pt with 2+ very tight pitting edema in BLEs from foot to knee with significant dryness throughout (distal > proximal), two small intact scabs on RLE from previous weeping; mild hemosiderian staining present in BLEs from ankles to knee; jagged toe nails   Scar No   Capillary refill Symmetrical   Dorsal pedal pulse Decreased   Dorsal pedal pulse comments from edema   Stemmer sign Negative   Ulceration No   Girth Measurements   Girth Measurements Refer to separate girth measurement flowsheet   Volume LE   Right LE (mL) 3456.17   Left LE (mL) 3847.3   LE volume comparison LLE volume greater than RLE volume   % difference +10.2%   Range of Motion   ROM No deficits were identified   Strength   Strength No deficits were identified   Posture   Posture Forward head position   Palpation   Palpation denies tenderness with palpation   Sensory   Sensory perception Light touch   Light touch  Intact   Vascular Assessment   Vascular Assessment Comments venous contributor to BLE edema due to history of DVTs and CVI presentation observed today   Coordination   Coordination Gross motor coordination appropriate   Muscle Tone   Muscle tone No deficits were identified   Planned Edema Interventions   Planned edema interventions Gradient compression bandaging;Fit for compression garment;Exercises;Precautions to prevent infection / exacerbation;Education;Manual therapy;Skin care / precautions;Home management program development   Clinical Impression   Criteria for skilled therapeutic intervention met Yes   Therapy diagnosis BLE secondary lymphedema, chronic, with venous component   Influenced by the following impairments / conditions Stage 2;Phlebolymphedema   Functional limitations due to impairments / conditions garments currently fitting poorly, recent weeping per pt report   Clinical Presentation Evolving/Changing   Clinical Presentation Rationale clinical judgement; extreme dryness and BLEs up quite a bit from when last seen ~1 year ago despite 27# weight loss   Clinical Decision Making (Complexity) Low complexity   Treatment Frequency 3x/week   Treatment duration 12 weeks   Patient / family and/or staff in agreement with plan of care Yes   Risks and benefits of therapy have been explained Yes   Education Assessment   Preferred learning style Listening   Barriers to learning No barriers   Goals   Edema Eval Goals 1;2;3;4   Goal 1   Goal identifier stg   Goal description pt to have around the clock tolerance to BLE GCB for edema reduction response   Target date 01/05/21   Goal 2   Goal identifier ltg   Goal description once appropriate, pt to be independent with donning, doffing and care of compression garments for longterm edema management for maintenance   Target date 03/22/21   Goal 3   Goal identifier ltg   Goal description pt to be independent with longterm edema management for maintenance via HEP,  elevation, skin cares and compression garment wear/use   Target date 03/22/21   Goal 4   Goal identifier ltg   Goal description pt to have at least 3-5 point improvement on LLIS due to decreased edema in BLEs   Target date 03/22/21   Total Evaluation Time   PT Anaid Low Complexity Minutes (98311) 10

## 2020-12-24 ENCOUNTER — VIRTUAL VISIT (OUTPATIENT)
Dept: PSYCHOLOGY | Facility: CLINIC | Age: 55
End: 2020-12-24
Payer: COMMERCIAL

## 2020-12-24 DIAGNOSIS — F31.62 MODERATE MIXED BIPOLAR I DISORDER (H): Primary | ICD-10-CM

## 2020-12-24 PROCEDURE — 90832 PSYTX W PT 30 MINUTES: CPT | Mod: 95 | Performed by: PSYCHOLOGIST

## 2020-12-24 NOTE — PROGRESS NOTES
"                                           Progress Note  Disclaimer: Voice recognition software was used to generate this note. As a result, wrong word or 'sound-a-like' substitutions may have occurred due to the inherent limitations of voice recognition software. There may be errors in the script that have gone undetected. Please consider this when interpreting information found in this chart.     Patient Name: Bia Bill  Date: 12/24/2020         Service Type: Individual      Session Start Time: 10:00PM  session End Time: 10:30 PM  Session Length: 30    Session #: 69    Attendees: Client attended alone    Service Modality:  Phone Visit:    The patient has been notified of the following:      \"We have found that certain health care needs can be provided without the need for a face to face visit.  This service lets us provide the care you need with a phone conversation.       I will have full access to your [place] medical record during this entire phone call.   I will be taking notes for your medical record.      Since this is like an office visit, we will bill your insurance company for this service.       There are potential benefits and risks of telephone visits (e.g. limits to patient confidentiality) that differ from in-person visits.?  Confidentiality still applies for telephone services, and nobody will record the visit.  It is important to be in a quiet, private space that is free of distractions (including cell phone or other devices) during the visit.??      If during the course of the call I believe a telephone visit is not appropriate, you will not be charged for this service\"     Consent has been obtained for this service by care team member: Yes      Treatment Plan Last Reviewed: 12/24/2020  PHQ-9 / CHIN-7 : see flowsheets    DATA  Interactive Complexity: No    Crisis: No       Progress Since Last Session (Related to Symptoms / Goals / Homework):   Symptoms: Improving Exercise and medications " helping.    Homework: Achieved / completed to satisfaction      Episode of Care Goals: Satisfactory progress - MAINTENANCE (Working to maintain change, with risk of relapse); Intervened by continuing to positively reinforce healthy behavior choice      Current / Ongoing Stressors and Concerns:   Longtime history of bipolar disorder. Family stress.   Client is recovering from congestive heart failure. Home care and nursing.     Spending time with daughter over holidays.    Blood sugar all over the place.     Treatment Objective(s) Addressed in This Session:   identify 2 strategies for more effectively managing Bipolar Disorder    Reinforcing positive steps taken for self care.       Intervention:   CBT: behavioral activation        ASSESSMENT: Current Emotional / Mental Status (status of significant symptoms):   Risk status (Self / Other harm or suicidal ideation)   Patient denies current fears or concerns for personal safety.   Patient denies current or recent suicidal ideation or behaviors.   Patient denies current or recent homicidal ideation or behaviors.   Patient denies current or recent self injurious behavior or ideation.   Patient denies other safety concerns.   Patient reports there has been no change in risk factors since their last session.     Patient reports there has been no change in protective factors since their last session.     Recommended that patient call 911 or go to the local ED should there be a change in any of these risk factors.     Appearance:   Unable to assess    Eye Contact:   Unable to assess    Psychomotor Behavior: Unable to assess    Attitude:   Cooperative    Orientation:   All   Speech    Rate / Production: Normal     Volume:  Normal    Mood:    Normal   Affect:    Appropriate    Thought Content:  Clear    Thought Form:  Coherent  Logical    Insight:    Good      Medication Review:   No changes to current psychiatric medication(s)     Medication Compliance:   Yes     Changes in  Health Issues:   None reported     Chemical Use Review:   Substance Use: Chemical use reviewed, no active concerns identified      Tobacco Use: Yes, increase.  Patient reports frequency of use ppd. Provided encouragement to quit     Diagnosis:  1. Moderate mixed bipolar I disorder (H)        Collateral Reports Completed:   Not Applicable    PLAN: (Patient Tasks / Therapist Tasks / Other)  Client to maintain boundaries with family. Designate a .        Kd Morris                                                         ______________________________________________________________________       Treatment Plan     Client's Name: Bia Bill               YOB: 1965     Date: 12/24/2020        DSM5 Diagnoses: (Sustained by DSM5 Criteria Listed Above)  Diagnoses: 296.40 Bipolar I Disorder Current or Most Recent Episode Manic, Unspecified  Psychosocial & Contextual Factors: financial difficulties, chronic pain, roommate issues  WHODAS 2.0 (12 item)               This questionnaire asks about difficulties due to health conditions. Health conditions             include disease or illnesses, other health problems that may be short or long lasting,             injuries, mental health or emotional problems, and problems with alcohol or drugs.                         Think back over the past 30 days and answer these questions, thinking about how much             difficulty you had doing the following activities. For each question, please [place] only             one response.      S1  Standing for long periods such as 30 minutes?  Mild =           2    S2  Taking care of household responsibilities?  Moderate =   3    S3  Learning a new task, for example, learning how to get to a new place?  Mild =           2    S4  How much of a problem do you have joining community activities (for example, festivals, [employer] or other activities) in the same way as anyone else can?  Moderate =    3    S5  How much have you been emotionally affected by your health problems?  Mild =           2             In the past 30 days, how much difficulty did you have in:    S6  Concentrating on doing something for ten minutes?  Mild =           2    S7  Walking a long distance such as a kilometer (or equivalent)?  Severe =       4    S8  Washing your whole body?  None =         1    S9  Getting dressed?  None =         1    S10  Dealing with people you do not know?  Moderate =   3    S11  Maintaining a friendship?  Severe =       4    S12  Your day to day work?  Moderate =   3       H1  Overall, in the past 30 days, how many days were these difficulties present?  Record number of days seven    H2  In the past 30 days, for how many days were you totally unable to carry out your usual activities or work because of any health condition?  Record number of days  seven    H3  In the past 30 days, not counting the days that you were totally unable, for how many days did you cut back or reduce your usual activities or work because of any health condition?  Record number of days five                      Referral / Collaboration:  Referral to another professional/service is not indicated at this time..     Anticipated number of session or this episode of care: 60     Goals  1. Education- the Biopsychosocial model of depression  a. Client will be able to describe how depression is effecting them physically, emotionally and socially  2. Behavioral Activation  a. Client will learn to assess their depression on a day to day basis  b. Client will Identify two forms of exercise/activity and engage in them 3 times per week  c. Client will Identify 3 things that make them laugh, and engage in these 5 times per week.  d. Client will Identify 1-2Creative activities or hobbies  and engage in them 2 times per week  e. Client will identify music, movies, books that make them feel good and use them 3-4 times per  week  3. Self-care  a. Client will identify 5 things they can do just for themselves  b. Client will take time for quiet, reflection, meditation 5 times per week  c. Client will Learn to set boundaries when appropriate  d. Client will Identify 2 individuals they can call on for support, distraction  4. Assessment of progress  a. Client will engage in assessment of their progress on a regular basis     Bipolar Disorder  Treatment plan:  1. Education- the Biopsychosocial model of Bipolar Disorder    a. Client will be able to describe in general terms what Bipolar Disorder  is and is not  b. Client will be able to describe how Bipolar Disorder  has affected their life in at least two different areas, such as school or work and Home/relationships  c. Clients parents/guardians or significant others will be provided information on what Bipolar Disorder  is and the ways it can affect relationships and be encouraged to be a part of clients treatment team.  2. Medication  a. Client will participate in medication evaluation for Bipolar Disorder  symptoms and follow medication recommendations.   3. Identification and management of triggers  a. Client and therapist will examine patient s history to determine if there are predictable triggers for manic or depressive episodes (e.g. boredom, anger, family stress)  b. Client and therapist will develop means of diffusing these triggers (e.g. relaxation strategies, boundary setting, anger management)  4. Comorbid conditions   a. Client and therapist will asses for comorbid conditions (e.g. anxiety, depression, substance use). and add additional items to treatment plan as needed  5. Self-care  a. Client will identify 3 things they can do just for themselves  b. Client will take time for quiet, reflection, meditation 3 times per week  c. Client will Learn to set boundaries when appropriate  d. Client will Identify 2 individuals they can call on for support, distraction  5. Behavioral  Activation  a. Client will Identify two forms of exercise/activity and engage in them 3 times per week  b. Client will Identify 2 things that make them laugh, and engage in these 3 times per week.  c. Client will Identify 2 Creative activities or hobbies  and engage in them 2 times per week  d. Client will identify music, movies, books that make them feel good and use them 3 times per week  6. Assessment of progress  a. Client will engage in assessment of their progress on a regular basis     Client has reviewed and agreed to the above plan.        Kd Morris             7/24/2020

## 2020-12-31 ENCOUNTER — APPOINTMENT (OUTPATIENT)
Dept: GENERAL RADIOLOGY | Facility: CLINIC | Age: 55
End: 2020-12-31
Attending: FAMILY MEDICINE
Payer: COMMERCIAL

## 2020-12-31 ENCOUNTER — HOSPITAL ENCOUNTER (OUTPATIENT)
Dept: CARDIAC REHAB | Facility: CLINIC | Age: 55
End: 2020-12-31
Attending: FAMILY MEDICINE
Payer: COMMERCIAL

## 2020-12-31 ENCOUNTER — ANTICOAGULATION THERAPY VISIT (OUTPATIENT)
Dept: FAMILY MEDICINE | Facility: CLINIC | Age: 55
End: 2020-12-31

## 2020-12-31 ENCOUNTER — HOSPITAL ENCOUNTER (INPATIENT)
Facility: CLINIC | Age: 55
LOS: 3 days | Discharge: HOME | End: 2021-01-03
Attending: FAMILY MEDICINE | Admitting: INTERNAL MEDICINE
Payer: COMMERCIAL

## 2020-12-31 ENCOUNTER — HOSPITAL ENCOUNTER (OUTPATIENT)
Dept: PHYSICAL THERAPY | Facility: CLINIC | Age: 55
Setting detail: THERAPIES SERIES
End: 2020-12-31
Attending: FAMILY MEDICINE
Payer: COMMERCIAL

## 2020-12-31 DIAGNOSIS — Z86.718 HISTORY OF RECURRENT DEEP VEIN THROMBOSIS (DVT): ICD-10-CM

## 2020-12-31 DIAGNOSIS — Z86.718 HISTORY OF DEEP VENOUS THROMBOSIS: ICD-10-CM

## 2020-12-31 DIAGNOSIS — I27.20 PROGRESSIVE PULMONARY HYPERTENSION (H): ICD-10-CM

## 2020-12-31 DIAGNOSIS — F17.210 CIGARETTE SMOKER: ICD-10-CM

## 2020-12-31 DIAGNOSIS — D69.6 THROMBOCYTOPENIA (H): ICD-10-CM

## 2020-12-31 DIAGNOSIS — J45.901 EXACERBATION OF ASTHMA, UNSPECIFIED ASTHMA SEVERITY, UNSPECIFIED WHETHER PERSISTENT: ICD-10-CM

## 2020-12-31 DIAGNOSIS — I27.20 PULMONARY HYPERTENSION (H): ICD-10-CM

## 2020-12-31 DIAGNOSIS — I89.0 LYMPHEDEMA: ICD-10-CM

## 2020-12-31 DIAGNOSIS — Z79.01 LONG TERM (CURRENT) USE OF ANTICOAGULANTS: ICD-10-CM

## 2020-12-31 DIAGNOSIS — O22.30 DEEP PHLEBOTHROMBOSIS, ANTEPARTUM, WITH DELIVERY (H): ICD-10-CM

## 2020-12-31 DIAGNOSIS — J96.01 ACUTE RESPIRATORY FAILURE WITH HYPOXIA (H): ICD-10-CM

## 2020-12-31 DIAGNOSIS — I50.30 DIASTOLIC CONGESTIVE HEART FAILURE, UNSPECIFIED HF CHRONICITY (H): Primary | Chronic | ICD-10-CM

## 2020-12-31 DIAGNOSIS — I50.813 ACUTE ON CHRONIC RIGHT-SIDED HEART FAILURE (H): ICD-10-CM

## 2020-12-31 DIAGNOSIS — Z20.828 EXPOSURE TO SARS-ASSOCIATED CORONAVIRUS: ICD-10-CM

## 2020-12-31 DIAGNOSIS — I82.409 DEEP PHLEBOTHROMBOSIS, ANTEPARTUM, WITH DELIVERY (H): ICD-10-CM

## 2020-12-31 PROBLEM — R55 NEAR SYNCOPE: Status: ACTIVE | Noted: 2020-12-31

## 2020-12-31 PROBLEM — I50.9 CHF (CONGESTIVE HEART FAILURE) (H): Chronic | Status: ACTIVE | Noted: 2020-12-31

## 2020-12-31 PROBLEM — I50.9 CHF (CONGESTIVE HEART FAILURE) (H): Status: ACTIVE | Noted: 2020-12-31

## 2020-12-31 LAB
ANION GAP SERPL CALCULATED.3IONS-SCNC: 3 MMOL/L (ref 3–14)
BASE EXCESS BLDV CALC-SCNC: 6.4 MMOL/L
BASOPHILS # BLD AUTO: 0 10E9/L (ref 0–0.2)
BASOPHILS NFR BLD AUTO: 0.4 %
BUN SERPL-MCNC: 11 MG/DL (ref 7–30)
CALCIUM SERPL-MCNC: 8.8 MG/DL (ref 8.5–10.1)
CHLORIDE SERPL-SCNC: 102 MMOL/L (ref 94–109)
CO2 SERPL-SCNC: 34 MMOL/L (ref 20–32)
CREAT SERPL-MCNC: 0.69 MG/DL (ref 0.52–1.04)
D DIMER PPP FEU-MCNC: <0.3 UG/ML FEU (ref 0–0.5)
DIFFERENTIAL METHOD BLD: ABNORMAL
EOSINOPHIL # BLD AUTO: 0.1 10E9/L (ref 0–0.7)
EOSINOPHIL NFR BLD AUTO: 0.7 %
ERYTHROCYTE [DISTWIDTH] IN BLOOD BY AUTOMATED COUNT: 20.9 % (ref 10–15)
FLUABV+SARS-COV-2+RSV PNL RESP NAA+PROBE: NEGATIVE
FLUABV+SARS-COV-2+RSV PNL RESP NAA+PROBE: NEGATIVE
GFR SERPL CREATININE-BSD FRML MDRD: >90 ML/MIN/{1.73_M2}
GLUCOSE BLDC GLUCOMTR-MCNC: 178 MG/DL (ref 70–99)
GLUCOSE SERPL-MCNC: 124 MG/DL (ref 70–99)
HCO3 BLDV-SCNC: 36 MMOL/L (ref 21–28)
HCT VFR BLD AUTO: 40.6 % (ref 35–47)
HCT VFR BLD AUTO: 42.1 % (ref 35–47)
HGB BLD-MCNC: 11.7 G/DL (ref 11.7–15.7)
IMM GRANULOCYTES # BLD: 0 10E9/L (ref 0–0.4)
IMM GRANULOCYTES NFR BLD: 0.6 %
INR PPP: 3.94 (ref 0.86–1.14)
LABORATORY COMMENT REPORT: NORMAL
LYMPHOCYTES # BLD AUTO: 0.9 10E9/L (ref 0.8–5.3)
LYMPHOCYTES NFR BLD AUTO: 12.3 %
MCH RBC QN AUTO: 23.7 PG (ref 26.5–33)
MCHC RBC AUTO-ENTMCNC: 27.8 G/DL (ref 31.5–36.5)
MCV RBC AUTO: 85 FL (ref 78–100)
MONOCYTES # BLD AUTO: 0.5 10E9/L (ref 0–1.3)
MONOCYTES NFR BLD AUTO: 6.4 %
NEUTROPHILS # BLD AUTO: 5.8 10E9/L (ref 1.6–8.3)
NEUTROPHILS NFR BLD AUTO: 79.6 %
NRBC # BLD AUTO: 0 10*3/UL
NRBC BLD AUTO-RTO: 0 /100
NT-PROBNP SERPL-MCNC: 781 PG/ML (ref 0–900)
O2/TOTAL GAS SETTING VFR VENT: ABNORMAL %
PCO2 BLDV: 80 MM HG (ref 40–50)
PH BLDV: 7.26 PH (ref 7.32–7.43)
PLATELET # BLD AUTO: 110 10E9/L (ref 150–450)
PO2 BLDV: 35 MM HG (ref 25–47)
POTASSIUM SERPL-SCNC: 3.4 MMOL/L (ref 3.4–5.3)
RBC # BLD AUTO: 4.93 10E12/L (ref 3.8–5.2)
RSV RNA SPEC QL NAA+PROBE: NORMAL
SARS-COV-2 RNA SPEC QL NAA+PROBE: NEGATIVE
SODIUM SERPL-SCNC: 139 MMOL/L (ref 133–144)
SPECIMEN SOURCE: NORMAL
TROPONIN I SERPL-MCNC: 0.02 UG/L (ref 0–0.04)
TROPONIN I SERPL-MCNC: 0.03 UG/L (ref 0–0.04)
WBC # BLD AUTO: 7.2 10E9/L (ref 4–11)

## 2020-12-31 PROCEDURE — G0237 THERAPEUTIC PROCD STRG ENDUR: HCPCS

## 2020-12-31 PROCEDURE — 99285 EMERGENCY DEPT VISIT HI MDM: CPT | Performed by: FAMILY MEDICINE

## 2020-12-31 PROCEDURE — 94640 AIRWAY INHALATION TREATMENT: CPT

## 2020-12-31 PROCEDURE — 94640 AIRWAY INHALATION TREATMENT: CPT | Mod: 76

## 2020-12-31 PROCEDURE — 80048 BASIC METABOLIC PNL TOTAL CA: CPT | Performed by: FAMILY MEDICINE

## 2020-12-31 PROCEDURE — 84145 PROCALCITONIN (PCT): CPT | Performed by: EMERGENCY MEDICINE

## 2020-12-31 PROCEDURE — 96375 TX/PRO/DX INJ NEW DRUG ADDON: CPT | Performed by: FAMILY MEDICINE

## 2020-12-31 PROCEDURE — 96365 THER/PROPH/DIAG IV INF INIT: CPT | Performed by: FAMILY MEDICINE

## 2020-12-31 PROCEDURE — 51702 INSERT TEMP BLADDER CATH: CPT | Performed by: FAMILY MEDICINE

## 2020-12-31 PROCEDURE — 84484 ASSAY OF TROPONIN QUANT: CPT | Performed by: EMERGENCY MEDICINE

## 2020-12-31 PROCEDURE — 85610 PROTHROMBIN TIME: CPT | Performed by: FAMILY MEDICINE

## 2020-12-31 PROCEDURE — 93010 ELECTROCARDIOGRAM REPORT: CPT | Performed by: EMERGENCY MEDICINE

## 2020-12-31 PROCEDURE — 84484 ASSAY OF TROPONIN QUANT: CPT | Performed by: FAMILY MEDICINE

## 2020-12-31 PROCEDURE — 250N000009 HC RX 250: Performed by: PHYSICIAN ASSISTANT

## 2020-12-31 PROCEDURE — 250N000011 HC RX IP 250 OP 636: Performed by: FAMILY MEDICINE

## 2020-12-31 PROCEDURE — C9803 HOPD COVID-19 SPEC COLLECT: HCPCS | Performed by: FAMILY MEDICINE

## 2020-12-31 PROCEDURE — 82803 BLOOD GASES ANY COMBINATION: CPT | Performed by: PHYSICIAN ASSISTANT

## 2020-12-31 PROCEDURE — 71045 X-RAY EXAM CHEST 1 VIEW: CPT

## 2020-12-31 PROCEDURE — 250N000013 HC RX MED GY IP 250 OP 250 PS 637: Performed by: FAMILY MEDICINE

## 2020-12-31 PROCEDURE — 99285 EMERGENCY DEPT VISIT HI MDM: CPT | Mod: 25 | Performed by: FAMILY MEDICINE

## 2020-12-31 PROCEDURE — 93005 ELECTROCARDIOGRAM TRACING: CPT | Performed by: EMERGENCY MEDICINE

## 2020-12-31 PROCEDURE — 36415 COLL VENOUS BLD VENIPUNCTURE: CPT | Performed by: FAMILY MEDICINE

## 2020-12-31 PROCEDURE — 250N000011 HC RX IP 250 OP 636: Performed by: PHYSICIAN ASSISTANT

## 2020-12-31 PROCEDURE — 85014 HEMATOCRIT: CPT | Performed by: FAMILY MEDICINE

## 2020-12-31 PROCEDURE — 99223 1ST HOSP IP/OBS HIGH 75: CPT | Mod: AI | Performed by: PHYSICIAN ASSISTANT

## 2020-12-31 PROCEDURE — G0238 OTH RESP PROC, INDIV: HCPCS

## 2020-12-31 PROCEDURE — 97140 MANUAL THERAPY 1/> REGIONS: CPT | Mod: GP | Performed by: REHABILITATION PRACTITIONER

## 2020-12-31 PROCEDURE — 999N000193 HC STATISTIC VISIT PULM REHAB

## 2020-12-31 PROCEDURE — 200N000001 HC R&B ICU

## 2020-12-31 PROCEDURE — 999N001017 HC STATISTIC GLUCOSE BY METER IP

## 2020-12-31 PROCEDURE — 87636 SARSCOV2 & INF A&B AMP PRB: CPT | Performed by: FAMILY MEDICINE

## 2020-12-31 PROCEDURE — 83880 ASSAY OF NATRIURETIC PEPTIDE: CPT | Performed by: FAMILY MEDICINE

## 2020-12-31 PROCEDURE — 94640 AIRWAY INHALATION TREATMENT: CPT | Performed by: FAMILY MEDICINE

## 2020-12-31 PROCEDURE — 85025 COMPLETE CBC W/AUTO DIFF WBC: CPT | Performed by: FAMILY MEDICINE

## 2020-12-31 PROCEDURE — 250N000013 HC RX MED GY IP 250 OP 250 PS 637: Performed by: PHYSICIAN ASSISTANT

## 2020-12-31 PROCEDURE — 258N000003 HC RX IP 258 OP 636: Performed by: PHYSICIAN ASSISTANT

## 2020-12-31 PROCEDURE — 85379 FIBRIN DEGRADATION QUANT: CPT | Performed by: PHYSICIAN ASSISTANT

## 2020-12-31 PROCEDURE — 0T9B70Z DRAINAGE OF BLADDER WITH DRAINAGE DEVICE, VIA NATURAL OR ARTIFICIAL OPENING: ICD-10-PCS | Performed by: EMERGENCY MEDICINE

## 2020-12-31 RX ORDER — ONDANSETRON 2 MG/ML
4 INJECTION INTRAMUSCULAR; INTRAVENOUS
Status: DISCONTINUED | OUTPATIENT
Start: 2020-12-31 | End: 2020-12-31

## 2020-12-31 RX ORDER — SIMVASTATIN 20 MG
20 TABLET ORAL AT BEDTIME
Status: DISCONTINUED | OUTPATIENT
Start: 2020-12-31 | End: 2021-01-03 | Stop reason: HOSPADM

## 2020-12-31 RX ORDER — LAMOTRIGINE 100 MG/1
100 TABLET ORAL EVERY EVENING
Status: DISCONTINUED | OUTPATIENT
Start: 2021-01-01 | End: 2021-01-03 | Stop reason: HOSPADM

## 2020-12-31 RX ORDER — NICOTINE POLACRILEX 4 MG
15-30 LOZENGE BUCCAL
Status: DISCONTINUED | OUTPATIENT
Start: 2020-12-31 | End: 2021-01-03 | Stop reason: HOSPADM

## 2020-12-31 RX ORDER — POLYETHYLENE GLYCOL 3350 17 G/17G
17 POWDER, FOR SOLUTION ORAL DAILY PRN
Status: DISCONTINUED | OUTPATIENT
Start: 2020-12-31 | End: 2021-01-03 | Stop reason: HOSPADM

## 2020-12-31 RX ORDER — FLUTICASONE PROPIONATE AND SALMETEROL XINAFOATE 230; 21 UG/1; UG/1
2 AEROSOL, METERED RESPIRATORY (INHALATION) 2 TIMES DAILY
Status: DISCONTINUED | OUTPATIENT
Start: 2020-12-31 | End: 2020-12-31

## 2020-12-31 RX ORDER — PRAMIPEXOLE DIHYDROCHLORIDE 0.12 MG/1
.125-.25 TABLET ORAL AT BEDTIME
Status: DISCONTINUED | OUTPATIENT
Start: 2020-12-31 | End: 2021-01-03 | Stop reason: HOSPADM

## 2020-12-31 RX ORDER — GABAPENTIN 600 MG/1
600 TABLET ORAL 3 TIMES DAILY
Status: DISCONTINUED | OUTPATIENT
Start: 2020-12-31 | End: 2021-01-03 | Stop reason: HOSPADM

## 2020-12-31 RX ORDER — ALLOPURINOL 300 MG/1
300 TABLET ORAL DAILY
Status: DISCONTINUED | OUTPATIENT
Start: 2021-01-01 | End: 2021-01-03 | Stop reason: HOSPADM

## 2020-12-31 RX ORDER — ONDANSETRON 4 MG/1
4 TABLET, ORALLY DISINTEGRATING ORAL EVERY 6 HOURS PRN
Status: DISCONTINUED | OUTPATIENT
Start: 2020-12-31 | End: 2021-01-03 | Stop reason: HOSPADM

## 2020-12-31 RX ORDER — DEXTROSE MONOHYDRATE 25 G/50ML
25-50 INJECTION, SOLUTION INTRAVENOUS
Status: DISCONTINUED | OUTPATIENT
Start: 2020-12-31 | End: 2021-01-03 | Stop reason: HOSPADM

## 2020-12-31 RX ORDER — ARIPIPRAZOLE 10 MG/1
10 TABLET ORAL DAILY
Status: DISCONTINUED | OUTPATIENT
Start: 2021-01-01 | End: 2021-01-03 | Stop reason: HOSPADM

## 2020-12-31 RX ORDER — IPRATROPIUM BROMIDE AND ALBUTEROL SULFATE 2.5; .5 MG/3ML; MG/3ML
3 SOLUTION RESPIRATORY (INHALATION)
Status: DISCONTINUED | OUTPATIENT
Start: 2020-12-31 | End: 2021-01-03 | Stop reason: HOSPADM

## 2020-12-31 RX ORDER — ALBUTEROL SULFATE 90 UG/1
6 AEROSOL, METERED RESPIRATORY (INHALATION) EVERY 6 HOURS PRN
Status: DISCONTINUED | OUTPATIENT
Start: 2020-12-31 | End: 2020-12-31

## 2020-12-31 RX ORDER — ONDANSETRON 2 MG/ML
4 INJECTION INTRAMUSCULAR; INTRAVENOUS EVERY 6 HOURS PRN
Status: DISCONTINUED | OUTPATIENT
Start: 2020-12-31 | End: 2021-01-03 | Stop reason: HOSPADM

## 2020-12-31 RX ORDER — FUROSEMIDE 10 MG/ML
40 INJECTION INTRAMUSCULAR; INTRAVENOUS ONCE
Status: COMPLETED | OUTPATIENT
Start: 2020-12-31 | End: 2020-12-31

## 2020-12-31 RX ORDER — METHYLPREDNISOLONE SODIUM SUCCINATE 40 MG/ML
60 INJECTION, POWDER, LYOPHILIZED, FOR SOLUTION INTRAMUSCULAR; INTRAVENOUS ONCE
Status: COMPLETED | OUTPATIENT
Start: 2020-12-31 | End: 2020-12-31

## 2020-12-31 RX ORDER — ALBUTEROL SULFATE 90 UG/1
2 AEROSOL, METERED RESPIRATORY (INHALATION) EVERY 6 HOURS PRN
Status: DISCONTINUED | OUTPATIENT
Start: 2020-12-31 | End: 2021-01-03 | Stop reason: HOSPADM

## 2020-12-31 RX ORDER — BUPROPION HYDROCHLORIDE 150 MG/1
150 TABLET ORAL EVERY EVENING
Status: DISCONTINUED | OUTPATIENT
Start: 2020-12-31 | End: 2021-01-03 | Stop reason: HOSPADM

## 2020-12-31 RX ORDER — FUROSEMIDE 10 MG/ML
40 INJECTION INTRAMUSCULAR; INTRAVENOUS EVERY 8 HOURS
Status: DISCONTINUED | OUTPATIENT
Start: 2020-12-31 | End: 2021-01-03 | Stop reason: HOSPADM

## 2020-12-31 RX ADMIN — OMEPRAZOLE 20 MG: 20 CAPSULE, DELAYED RELEASE ORAL at 21:50

## 2020-12-31 RX ADMIN — FUROSEMIDE 40 MG: 10 INJECTION, SOLUTION INTRAVENOUS at 16:21

## 2020-12-31 RX ADMIN — FUROSEMIDE 40 MG: 10 INJECTION, SOLUTION INTRAMUSCULAR; INTRAVENOUS at 21:49

## 2020-12-31 RX ADMIN — METHYLPREDNISOLONE SODIUM SUCCINATE 60 MG: 40 INJECTION, POWDER, FOR SOLUTION INTRAMUSCULAR; INTRAVENOUS at 16:21

## 2020-12-31 RX ADMIN — TRAZODONE HYDROCHLORIDE 150 MG: 100 TABLET ORAL at 21:51

## 2020-12-31 RX ADMIN — AZITHROMYCIN 500 MG: 500 INJECTION, POWDER, LYOPHILIZED, FOR SOLUTION INTRAVENOUS at 18:31

## 2020-12-31 RX ADMIN — ALBUTEROL SULFATE 6 PUFF: 90 AEROSOL, METERED RESPIRATORY (INHALATION) at 14:42

## 2020-12-31 RX ADMIN — PRAMIPEXOLE DIHYDROCHLORIDE 0.25 MG: 0.12 TABLET ORAL at 21:49

## 2020-12-31 RX ADMIN — FLUTICASONE FUROATE AND VILANTEROL TRIFENATATE 1 PUFF: 200; 25 POWDER RESPIRATORY (INHALATION) at 21:49

## 2020-12-31 RX ADMIN — SIMVASTATIN 20 MG: 20 TABLET, FILM COATED ORAL at 21:51

## 2020-12-31 RX ADMIN — IPRATROPIUM BROMIDE AND ALBUTEROL SULFATE 3 ML: .5; 3 SOLUTION RESPIRATORY (INHALATION) at 20:00

## 2020-12-31 RX ADMIN — GABAPENTIN 600 MG: 600 TABLET, FILM COATED ORAL at 21:50

## 2020-12-31 RX ADMIN — BUPROPION HYDROCHLORIDE 150 MG: 150 TABLET, FILM COATED, EXTENDED RELEASE ORAL at 21:48

## 2020-12-31 RX ADMIN — ONDANSETRON 4 MG: 2 INJECTION INTRAMUSCULAR; INTRAVENOUS at 13:57

## 2020-12-31 ASSESSMENT — MIFFLIN-ST. JEOR
SCORE: 1488.75
SCORE: 1501.27

## 2020-12-31 ASSESSMENT — ACTIVITIES OF DAILY LIVING (ADL): ADLS_ACUITY_SCORE: 15

## 2020-12-31 NOTE — ED PROVIDER NOTES
"  HPI   The patient is a 55-year-old female presenting with a new cough with upset stomach, emesis, and lightheadedness.  She was in to see the lymphedema clinic and the pulmonologist today as scheduled.  She had a follow-up appointment that was recommended by her primary doctor.  She was in the hospital about a month ago for heart failure.  She denies having a known history for myocardial infarction or stents.  She does smoke on a regular basis.  The patient tells me that there were no additional medications or medication changes recommended today by the pulmonologist.    The patient reports onset of symptoms at about 10:00 AM this morning, after her clinic visits.  She had sudden onset of coughing spasms with emesis following.  She does report an upset stomach.  She does report occasional nausea.  She denies abdominal pain.  She denies recent diarrhea.  She denies recent constipation.  She does report occasional chest pain, \"since I was diagnosed with heart failure a month ago.\"  No ongoing chest pain here or pain since the onset of nausea or coughing.  No fever.  No myalgia.  No sleepiness or fatigue.  She describes lightheadedness like she is going to faint.  She says this came on at about 10:00 as well and has been waxing and waning since onset.        Allergies:  Allergies   Allergen Reactions     Darvocet [Propoxyphene N-Apap] Anaphylaxis     Darvocet,Percocet      Percocet [Oxycodone-Acetaminophen] Anaphylaxis, Hives and Swelling     Has tolerated hydromorphone in the past.      Asa [Aspirin] Hives     Aspirin causes seizures and hives, throat swelling.   Has tolerated ketorolac in the past.        Bee      Ibuprofen Swelling     Throat swelling per patient      Lyrica [Pregabalin] Dizziness and Swelling     Povidone Iodine Rash     Reaction to topical betadine     Tape [Adhesive Tape] Rash     Problem List:    Patient Active Problem List    Diagnosis Date Noted     CHF (congestive heart failure) (H) " 12/31/2020     Priority: Medium     Acute respiratory failure with hypoxia (H) 12/31/2020     Priority: Medium     Pulmonary hypertension (H) 12/31/2020     Priority: Medium     Acute on chronic right-sided heart failure (H) 12/31/2020     Priority: Medium     Exacerbation of asthma, unspecified asthma severity, unspecified whether persistent 12/31/2020     Priority: Medium     Hypoxia 11/17/2020     Priority: Medium     Acute cor pulmonale (H) 11/17/2020     Priority: Medium     Acute congestive heart failure, unspecified heart failure type (H) 11/08/2020     Priority: Medium     Chronic respiratory failure with hypercapnia (H) 11/08/2020     Priority: Medium     VBG 11/8/2020 7.36/61/16       Hyperlipidemia 11/08/2020     Priority: Medium     Elevated troponin 11/08/2020     Priority: Medium     Thrombocytopenia (H) 06/22/2020     Priority: Medium     Chronic since 2008. 'Stable' splenomegally on CT 11/3/2020       Type 2 diabetes mellitus (H) 12/10/2019     Priority: Medium     A1c Oct 2019 = 6.1       Lymphedema of both lower extremities 10/29/2019     Priority: Medium     Chronic gout without tophus 10/03/2018     Priority: Medium     Conductive hearing loss of left ear with restricted hearing of right ear 04/17/2018     Priority: Medium     Sensorineural hearing loss (SNHL) of right ear with restricted hearing of left ear 04/17/2018     Priority: Medium     Cyst of left ovary 01/11/2016     Priority: Medium     Morbid obesity (H) 06/17/2015     Priority: Medium     Vitamin D deficiency 11/05/2014     Priority: Medium     Somatization disorder 07/10/2014     Priority: Medium     Benign neoplasm of colon (POLYPOSIS) 06/04/2014     Priority: Medium     Notes Recorded by Moris Thomas MD on 4/25/2014 at 1:36 PM- Adenomatous polyp colonoscopy 5 years  Collected: 11/7/2017 Colon at 30 cm, mucosal biopsy: Hyperplastic polyp, with focal changes suggestive of sessile serrated       DDD (degenerative disc  disease), cervical 12/17/2013     Priority: Medium     [facility] 09/03/2013     Priority: Medium     Status:  Accepted  Care Coordinator:  Alla Fernandez    See Letters for [facility] Care Plan  Date:  October 20, 2014         Rosacea 08/26/2013     Priority: Medium     Moderate mixed bipolar I disorder (H) 10/13/2011     Priority: Medium     Personality disorder, depressive 10/13/2011     Priority: Medium     Tobacco use disorder 04/01/2011     Priority: Medium     GILES (Obstructive Sleep Apnea)/ Obesity Hypoventilation 03/01/2010     Priority: Medium     Sleep study 2/24/2010 (209#) - AHI 15.7, RDI 48.3, low O2 86%, PLMI 114  [facility] Sleep study 1/6/20 (241#) - BIPAP 22/7 and 23/8 with improvement in events and hypoventilation        RLS (restless legs syndrome) 03/01/2010     Priority: Medium     Ferritin 11.       Fatty liver 08/28/2009     Priority: Medium     US on 8/26/2009        Hypothyroidism 05/13/2008     Priority: Medium     was hyperthyroid - Hashimoto's and graves and had iodine treatment done at [place] in early 2008.    Now hypothyroid - is following at [facility], on 125mcg levothyroxine - see scanned documents       Esophageal reflux 04/29/2008     Priority: Medium     Developmental reading disorder 09/12/2007     Priority: Medium     Chondromalacia of patella 02/27/2007     Priority: Medium     Secondary polycythemia      Priority: Medium     Betheda HGB, a high-oxygen affinity hemoglobin which results in a normal compensatory erythrocytosis.    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 Patients with high oxygen affinity hemoglobin typically have higher hematocrit level from compensatory erythrocytosis and as long as they are asymptomatic they do not need phlebotomy. Given her previous history of 'peripheral artery disease' and 'stroke' Hematology recommended phlebotomy monthly with goal HCT<55% 2014.        Alcohol abuse, in  remission 08/01/2006     Priority: Medium     Quit 1996 after court ordered, lost her children,        Mild persistent asthma 07/11/2006     Priority: Medium     PFT 9/13/2013  FEV1 moderately reduced. FVC moderately reduced. FEV1/FVC ratio normal. 2. Bronchodilator Response: No significant change is seen with bronchodilators. Not consistent with COPD       Chronic leg pain 07/11/2006     Priority: Medium     Has had chronic pain in the left groin and upper leg secondary to a blood clot in the thigh, treated with neurontin without benefit, still has chronic pain since 2005, sharp shooting intermittantly.       History of deep venous thrombosis 07/11/2006     Priority: Medium     She had a DVT post pregnancy and delivery in 1992.   Hospitalized at [facility] for extensive left lower leg DVT 3/26/2004: This occurred a month after pneumonia episode and decreased activity.  She had lytic therapy, tPA followed by Possis mechanical thrombectomy device and three stents in veins.  She did have Groshong catheter at the time.  There was a positive lupus anticoagulant, negative Factor V and cardiolipin clement.         Past Medical History:    Past Medical History:   Diagnosis Date     Acromioclavicular joint arthritis 01/25/2017     Acute bronchospasm 08/15/2016     Acute gouty arthritis 08/04/2014     Anxiety state, unspecified      Bipolar I disorder, most recent episode (or current) unspecified      Closed fracture of metatarsal bone 06/05/2007     COPD exacerbation (H) 10/29/2019     Depressive disorder, not elsewhere classified      DVT, lower extremity (H)      Headache 10/17/2014     History of stroke symptoms, stroke ruled out on imaging 2011     Impingement syndrome of shoulder region 01/25/2017     Polycythemia, secondary      Right shoulder pain 10/14/2014     Trochanteric bursitis of both hips 01/11/2016     Past Surgical History:    Past Surgical History:   Procedure Laterality Date     BONE MARROW BIOPSY, BONE  SPECIMEN, NEEDLE/TROCAR N/A 11/17/2014    Procedure: BIOPSY BONE MARROW;  Surgeon: Hay Aaron MD;  Location: [facility]     D & C  10/26/2009    with uterine ablation     ENDOVASCULAR PLACEMENT VASCULAR DEVICE Left 2004    Left leg venous stent x 3     ESOPHAGOSCOPY, GASTROSCOPY, DUODENOSCOPY (EGD), COMBINED  04/21/2014    Procedure: Gastroscopy;  Surgeon: Moris Thomas MD;  Location: [facility]     HYSTERECTOMY, PAP NO LONGER INDICATED  01/04/2010     LAPAROSCOPIC CHOLECYSTECTOMY N/A 01/04/2019    Procedure: Laparoscopic cholecystectomy;  Surgeon: Aaron Siegel MD;  Location: [facility]     LITHOTRIPSY  2004    Lithotrypsy     Family History:    Family History   Problem Relation Age of Onset     Hypertension Mother      Diabetes Mother      Blood Disease Mother      Heart Disease Mother         chf     Cerebrovascular Disease Mother      Hypertension Father      Cancer Father         lung cancer     Allergies Sister      Diabetes Sister      Depression Sister      Hypertension Sister      Social History:  Marital Status:   [4]  Social History     Tobacco Use     Smoking status: Current Every Day Smoker     Packs/day: 0.50     Years: 43.00     Pack years: 21.50     Types: Cigarettes     Smokeless tobacco: Never Used     Tobacco comment: started at age 11.  Quit during pregnancyX4.  Quit 3 months ago.    Substance Use Topics     Alcohol use: No     Alcohol/week: 0.0 standard drinks     Comment: quit 1995     Drug use: No     Comment: past hx 22 years ago/ uppers downers, canibus      Medications:         acetaminophen (TYLENOL) 500 MG tablet       ADVAIR -21 MCG/ACT inhaler       albuterol (PROAIR HFA/PROVENTIL HFA/VENTOLIN HFA) 108 (90 Base) MCG/ACT inhaler       albuterol (PROVENTIL) (2.5 MG/3ML) 0.083% neb solution       alcohol swab prep pads       allopurinol (ZYLOPRIM) 300 MG tablet       ARIPiprazole (ABILIFY) 10 MG tablet       blood glucose (NO BRAND SPECIFIED) test strip       blood  "glucose calibration (NO BRAND SPECIFIED) solution       blood glucose monitoring (NO BRAND SPECIFIED) meter device kit       buPROPion (WELLBUTRIN XL) 150 MG 24 hr tablet       EPINEPHrine (ANY BX GENERIC EQUIV) 0.3 MG/0.3ML injection 2-pack       furosemide (LASIX) 40 MG tablet       gabapentin (NEURONTIN) 300 MG capsule       gabapentin (NEURONTIN) 600 MG tablet       ipratropium - albuterol 0.5 mg/2.5 mg/3 mL (DUONEB) 0.5-2.5 (3) MG/3ML neb solution       lamoTRIgine (LAMICTAL) 100 MG tablet       levothyroxine (SYNTHROID/LEVOTHROID) 175 MCG tablet       mometasone-formoterol (DULERA) 200-5 MCG/ACT oral inhaler       nitroGLYcerin (NITROSTAT) 0.4 MG sublingual tablet       omeprazole (PRILOSEC) 20 MG DR capsule       order for DME       order for DME       order for DME       order for DME       order for DME       potassium chloride ER (KLOR-CON M) 10 MEQ CR tablet       pramipexole (MIRAPEX) 0.125 MG tablet       simvastatin (ZOCOR) 20 MG tablet       thin (NO BRAND SPECIFIED) lancets       traZODone (DESYREL) 150 MG tablet       VITAMIN D3 25 MCG (1000 UT) tablet       warfarin ANTICOAGULANT (JANTOVEN ANTICOAGULANT) 5 MG tablet      Review of Systems   All other systems reviewed and are negative.      PE   BP: (!) 154/71  Pulse: 86  Temp: 97.1  F (36.2  C)  Resp: 16  Height: 147.3 cm (4' 10\")  Weight: 101.2 kg (223 lb)  SpO2: 93 %  Physical Exam  Vitals signs reviewed.   Constitutional:       General: She is not in acute distress.     Appearance: She is well-developed.   HENT:      Head: Normocephalic and atraumatic.      Right Ear: External ear normal.      Left Ear: External ear normal.      Nose: Nose normal.      Mouth/Throat:      Mouth: Mucous membranes are moist.      Pharynx: Oropharynx is clear.   Eyes:      Extraocular Movements: Extraocular movements intact.      Conjunctiva/sclera: Conjunctivae normal.      Pupils: Pupils are equal, round, and reactive to light.   Neck:      Musculoskeletal: Normal " range of motion.   Cardiovascular:      Rate and Rhythm: Normal rate and regular rhythm.      Heart sounds: Normal heart sounds.   Pulmonary:      Effort: Pulmonary effort is normal.      Breath sounds: Normal breath sounds.   Abdominal:      Palpations: Abdomen is soft.      Tenderness: There is no abdominal tenderness.   Musculoskeletal: Normal range of motion.   Skin:     General: Skin is warm and dry.   Neurological:      Mental Status: She is alert and oriented to person, place, and time.   Psychiatric:         Behavior: Behavior normal.         ED COURSE and MDM   1354.  The patient has a new cough with posttussive emesis, and upset stomach and occasional nausea, and lightheadedness.  She has had intermittent chest pain since a month ago, as above.  Chest x-ray pending.  Covid swab.  Lab values.  EKG.  Zofran.    1418.  The patient had sudden sob and increased respiratory distress with lying flat.  NC oxygen 4 L/min provided and she improved quickly.  I just turned the rate down to 2 L/min as she is sitting upright and without distress.  She continues to have an occasional cough.  Chest x-ray pending.  BNP added.    1554.  The patient has persistent hypoxemia if she is without oxygen.  She consistently drops into the mid 80s without supplemental oxygen.  With nasal cannula 2 L she maintains at 92-94%.  Her chest x-ray shows vascular congestion.  Her BNP is elevated.  I suspect she has an asthma/COPD exacerbation with acute right heart failure related to that.  Solu-Medrol ordered.  Lasix 40 mg IV ordered.  I spoke with the hospitalist service, Leigh.  She is excepting the patient to their service.  No additional orders requested.  No orders pending at the time of admission.    LABS  Labs Ordered and Resulted from Time of ED Arrival Up to the Time of Departure from the ED   CBC WITH PLATELETS DIFFERENTIAL - Abnormal; Notable for the following components:       Result Value    MCH 23.7 (*)     MCHC 27.8 (*)      RDW 20.9 (*)     Platelet Count 110 (*)     All other components within normal limits   BASIC METABOLIC PANEL - Abnormal; Notable for the following components:    Carbon Dioxide 34 (*)     Glucose 124 (*)     All other components within normal limits   TROPONIN I   INFLUENZA A/B & SARS-COV2 PCR MULTIPLEX   NT PROBNP INPATIENT       IMAGING  Images reviewed by me.  Radiology report also reviewed.  XR Chest Port 1 View   Final Result   IMPRESSION: Enlarged cardiac silhouette and mild pulmonary vascular   congestion. No significant pleural effusion or pneumothorax.      GILMER BATRES MD          Procedures    Medications   ondansetron (ZOFRAN) injection 4 mg (4 mg Intravenous Given 12/31/20 1357)   albuterol (PROAIR HFA/PROVENTIL HFA/VENTOLIN HFA) 108 (90 Base) MCG/ACT inhaler 6 puff (6 puffs Inhalation Given 12/31/20 1442)   methylPREDNISolone sodium succinate (solu-MEDROL) injection 60 mg (has no administration in time range)   furosemide (LASIX) injection 40 mg (has no administration in time range)         IMPRESSION       ICD-10-CM    1. Exacerbation of asthma, unspecified asthma severity, unspecified whether persistent  J45.901    2. Pulmonary hypertension (H)  I27.20    3. Acute on chronic right-sided heart failure (H)  I50.813    4. Acute respiratory failure with hypoxia (H)  J96.01             Medication List      There are no discharge medications for this visit.                       Baldo Ellis MD  12/31/20 [id number]

## 2020-12-31 NOTE — H&P
[facility]     History and Physical  Hospital Medicine       Date of Admission:  12/31/2020  Date of Service: 12/31/2020     Assessment & Plan   Bia Bill is a 55 year old female who presents on 12/31/2020 with cough and emesis.     Acute respiratory failure with hypoxia and hypercapnia   Onset of shortness of breath early today. In Ed she dipped into low 80s on room air, supplemental oxygen was applied. While she reports feeling mildly improved, she is still short of breath and using accessory muscles to breath. She admits to extreme fatigue. PH of 7.26, PCO2 80, bicarb 36. Patient was placed on BIPAP after VBG returned. Suspect heart failure exacerbation vs COPD/Asthma exacerbation vs PNA vs PE vs other. COVID and influenza negative. Trop 0.021--0.026, D dimer <0.03, WBC 7.2, . CXR with enlarged cardiac silhouette and pulmonary vascular congestion.    - Will admit to ICU and contact respiratory to start patient on BIPAP.    - Repeat VBG in am.    - Continuous pulse ox monitoring.    - Cover with azithromycin 500 mg for PNA vs COPD exacerbation.    - Check procal, stop abx if negative.     Mild persistent asthma/COPD with possible acute exacerbation  Acute exacerbation could certainly be contributing to patient's worsened respiratory status. She was given 60 mg solumedrol in ED.   - Scheduled duonebs every 4 hours while awake.    - Continue prior to admission dulera (sub is breo ellipta).    - Per pharmacy: Patient has been taking both Dulera and Advair at home which is duplicate therapy. Please address upon discharge.      Acute exacerbation of right sided diastolic heart failure    Pulmonary hypertension   She had a NM lexiscan stress test on 11/25/2020: left ventricular EF at stress is 78%. Patient managed prior to admission with lasix 60 mg in am and 40 mg in afternoon. She had a visit today for pulmonary treatment at cardiac rehab. She denies weight gain, she actually  reports losing about 20 pounds recently.  She was given dose of IV lasix 40 mg in ED.    - Daily weights.    - Strict I&Os.    - Anton was placed in ED due to retention, 500 ml out upon placement. Given anton in place with continue with IV lasix 40 mg every 8 hours overnight. eval in am to determine if patient is improving and is able to transition to oral medications.     Dizziness/near syncope   Patient reports that she was at pulmonary rehab today, during the end of her workout she began feeling ill. She reports she felt dizzy and lightheaded. She proceeded to go to her lymphedema appointment and again was not feeling well. By the time her appointment was coming to an end, she could hardly walk due to her lightheadedness.    - Telemetry.   - Up with stand by assist.    - Fall precautions.    - Check orthostatic blood pressure.       History of deep venous thrombosis, positive lupus anticoagulant    - Continue prior to admission warfarin, pharmacy to dose.       Hypothyroidism   - Continue prior to admission levothyroxine.       GILES (Obstructive Sleep Apnea)/ Obesity Hypoventilation   - Continue home BIPAP.       Moderate mixed bipolar I disorder   Personality disorder, depressive   - Continue prior to admission trazodone, Lamictal, Abilify, Wellbutrin.     Chronic gout without tophus   - Continue prior to admission allopurinol.     Lymphedema of both lower extremities  She reports her edema has continued to improve. She had a visit today at the lymphedema clinic.    - Continue outpatient lymphedema treatment.     RLS   - Continue prior to admission mirapex.       Type 2 diabetes mellitus   Managed with diet. Not on medications at baseline.    - ISS.   - Moderate CHO diet.    - Check A1c.       Thrombocytopenia, chronic   Admit platelet count of 110. Baseline appears to be 112-140. Stable.   - Continue to monitor.     Hyperlipidemia   - Continue prior to admission simvastatin.     GERD   - Continue prior to  admission omeprazole.     COVID ruled out  Patient was tested in ED, rapid test was NEGATIVE.         Diet:  Moderate CHO diet   DVT Prophylaxis: Warfarin  Goldsmith Catheter: not present  Code Status:  Full code     Disposition Plan   Expected discharge: 2 - 3 days, recommended to prior living arrangement once adequate pain management/ tolerating PO medications, maintaining oxygen saturations on room air,  and safe disposition plan.  Entered: Leigh Patiño PA-C 12/31/2020, 3:31 PM       Status: Patient is appropriate for admission to inpatient service for further evaluation and treatment.    Leigh Patiño PA-C        The patient's care was discussed with the Attending Physician, Dr. Sylvester Erickson, ED provider, Dr. Ellis , and the patient.     Primary Care Physician   Kd Leong [phone]    History is obtained from the patient, Norah Bill and review of old records via the EMR.    History of Present Illness   Bia Bill is a 55 year old female with past medical history of COPD, asthma, HF, gout, hypertension, DVT, bipolar disorder, GILES, lymphedema, hyperlipidemia, thrombocytopenia who now presents on 12/31/2020 with dizziness and cough.     Patient was at pulmonary edema clinic and lymphedema clinic today, she was initially feeling well. Patient reports that she was at pulmonary rehab today, during the end of her workout she began feeling ill. She reports she felt dizzy and lightheaded. She proceeded to go to her lymphedema appointment and again was not feeling well. By the time her appointment was coming to an end, she could hardly walk due to her lightheadedness. Around 1030 this morning she developed a cough, she had a coughing fit that ultimately ended in her vomiting, she has had 2 episodes of emesis. She admits to feeling heart palpitations. She feels short of breath. She feels extremely tired. Reports has not been sleeping well.     She denies chills or fever. No sore throat. No nasal  congestion. No chest pain. No abdominal pain or nausea. No change in bowel habits. No urinary complaints. No loss of taste or smell. No known sick contacts. She denies increase in her weight. Has chronic lower ext edema that has actually improved from prior. Reports has been taking her lasix as prescribed. Admits to having change in her vision recently - due to her DM and has recently gotten a new glasses prescription.     Patient is a current daily smoker. Smokes up to 4 cigs per day. Denies alcohol use. Denies recreational drug use.     While patient was in the ED, her oxygen sats dropped to 80% on room air, this occurred when she was laid back in bed. She was placed on 2 L supplemental oxygen and she recovered well. She again dipped into 80s when HOB was laid back to obtain XR.    Patient lives at home alone.     Review of Systems    Negative other than stated in HPI.     Past Medical History    Past Medical History:   Diagnosis Date     Acromioclavicular joint arthritis 01/25/2017     Acute bronchospasm 08/15/2016     Acute gouty arthritis 08/04/2014     Anxiety state, unspecified      Bipolar I disorder, most recent episode (or current) unspecified      Closed fracture of metatarsal bone 06/05/2007     COPD exacerbation (H) 10/29/2019     Depressive disorder, not elsewhere classified      DVT, lower extremity (H)      Headache 10/17/2014     History of stroke symptoms, stroke ruled out on imaging 2011    Admissions in 2011 and 2014 for stroke-like symptoms. Evaluated by neurology on both occasions, imaging not consistent with stroke. Thought to be due to conversion disorder in 2011.     Impingement syndrome of shoulder region 01/25/2017     Polycythemia, secondary     [place] HGB      Right shoulder pain 10/14/2014     Trochanteric bursitis of both hips 01/11/2016     Patient Active Problem List    Diagnosis Date Noted     CHF (congestive heart failure) (H) 12/31/2020     Priority: Medium     Acute respiratory  failure with hypoxia (H) 12/31/2020     Priority: Medium     Hypoxia 11/17/2020     Priority: Medium     Acute cor pulmonale (H) 11/17/2020     Priority: Medium     Acute congestive heart failure, unspecified heart failure type (H) 11/08/2020     Priority: Medium     Chronic respiratory failure with hypercapnia (H) 11/08/2020     Priority: Medium     VBG 11/8/2020 7.36/61/16       Hyperlipidemia 11/08/2020     Priority: Medium     Elevated troponin 11/08/2020     Priority: Medium     Thrombocytopenia (H) 06/22/2020     Priority: Medium     Chronic since 2008. 'Stable' splenomegally on CT 11/3/2020       Type 2 diabetes mellitus (H) 12/10/2019     Priority: Medium     A1c Oct 2019 = 6.1       Lymphedema of both lower extremities 10/29/2019     Priority: Medium     Chronic gout without tophus 10/03/2018     Priority: Medium     Conductive hearing loss of left ear with restricted hearing of right ear 04/17/2018     Priority: Medium     Sensorineural hearing loss (SNHL) of right ear with restricted hearing of left ear 04/17/2018     Priority: Medium     Cyst of left ovary 01/11/2016     Priority: Medium     Morbid obesity (H) 06/17/2015     Priority: Medium     Vitamin D deficiency 11/05/2014     Priority: Medium     Somatization disorder 07/10/2014     Priority: Medium     Benign neoplasm of colon (POLYPOSIS) 06/04/2014     Priority: Medium     Notes Recorded by Moris Thomas MD on 4/25/2014 at 1:36 PM- Adenomatous polyp colonoscopy 5 years  Collected: 11/7/2017 Colon at 30 cm, mucosal biopsy: Hyperplastic polyp, with focal changes suggestive of sessile serrated       DDD (degenerative disc disease), cervical 12/17/2013     Priority: Medium     [facility] 09/03/2013     Priority: Medium     Status:  Accepted  Care Coordinator:  Alla Fernandez    See Letters for H Care Plan  Date:  October 20, 2014         Rosacea 08/26/2013     Priority: Medium     Moderate mixed bipolar I disorder (H) 10/13/2011      Priority: Medium     Personality disorder, depressive 10/13/2011     Priority: Medium     Tobacco use disorder 04/01/2011     Priority: Medium     GILES (Obstructive Sleep Apnea)/ Obesity Hypoventilation 03/01/2010     Priority: Medium     Sleep study 2/24/2010 (209#) - AHI 15.7, RDI 48.3, low O2 86%, PLMI 114  [place] Sleep study 1/6/20 (241#) - BIPAP 22/7 and 23/8 with improvement in events and hypoventilation        RLS (restless legs syndrome) 03/01/2010     Priority: Medium     Ferritin 11.       Fatty liver 08/28/2009     Priority: Medium     US on 8/26/2009        Hypothyroidism 05/13/2008     Priority: Medium     was hyperthyroid - Hashimoto's and graves and had iodine treatment done at [place] in early 2008.    Now hypothyroid - is following at [place] [facility], on 125mcg levothyroxine - see scanned documents       Esophageal reflux 04/29/2008     Priority: Medium     Developmental reading disorder 09/12/2007     Priority: Medium     Chondromalacia of patella 02/27/2007     Priority: Medium     Secondary polycythemia      Priority: Medium     [place] HGB, a high-oxygen affinity hemoglobin which results in a normal compensatory erythrocytosis.    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 Patients with high oxygen affinity hemoglobin typically have higher hematocrit level from compensatory erythrocytosis and as long as they are asymptomatic they do not need phlebotomy. Given her previous history of 'peripheral artery disease' and 'stroke' Hematology recommended phlebotomy monthly with goal HCT<55% 2014.        Alcohol abuse, in remission 08/01/2006     Priority: Medium     Quit 1996 after court ordered, lost her children,        Mild persistent asthma 07/11/2006     Priority: Medium     PFT 9/13/2013  FEV1 moderately reduced. FVC moderately reduced. FEV1/FVC ratio normal. 2. Bronchodilator Response: No significant change is seen with bronchodilators.  Not consistent with COPD       Chronic leg pain 07/11/2006     Priority: Medium     Has had chronic pain in the left groin and upper leg secondary to a blood clot in the thigh, treated with neurontin without benefit, still has chronic pain since 2005, sharp shooting intermittantly.       History of deep venous thrombosis 07/11/2006     Priority: Medium     She had a DVT post pregnancy and delivery in 1992.   Hospitalized at [facility] for extensive left lower leg DVT 3/26/2004: This occurred a month after pneumonia episode and decreased activity.  She had lytic therapy, tPA followed by Possis mechanical thrombectomy device and three stents in veins.  She did have [place] catheter at the time.  There was a positive lupus anticoagulant, negative Factor V and cardiolipin clement.           Past Surgical History   Past Surgical History:   Procedure Laterality Date     BONE MARROW BIOPSY, BONE SPECIMEN, NEEDLE/TROCAR N/A 11/17/2014    Procedure: BIOPSY BONE MARROW;  Surgeon: Hay Aaron MD;  Location: [facility]     D & C  10/26/2009    with uterine ablation     ENDOVASCULAR PLACEMENT VASCULAR DEVICE Left 2004    Left leg venous stent x 3     ESOPHAGOSCOPY, GASTROSCOPY, DUODENOSCOPY (EGD), COMBINED  04/21/2014    Procedure: Gastroscopy;  Surgeon: Moris Thomas MD;  Location: [facility]     HYSTERECTOMY, PAP NO LONGER INDICATED  01/04/2010     LAPAROSCOPIC CHOLECYSTECTOMY N/A 01/04/2019    Procedure: Laparoscopic cholecystectomy;  Surgeon: Aaron Siegel MD;  Location: [facility]     LITHOTRIPSY  2004    Lithotrypsy        Prior to Admission Medications   Prior to Admission Medications   Prescriptions Last Dose Informant Patient Reported? Taking?   ADVAIR -21 MCG/ACT inhaler  Other No No   Sig: INHALE TWO PUFFS BY MOUTH TWICE A DAY   Patient taking differently: Inhale 2 puffs into the lungs 2 times daily *Do not use more frequently than twice daily.*  Rinse mouth after use.   ARIPiprazole (ABILIFY) 10 MG tablet   Other No No   Sig: TAKE ONE TABLET BY MOUTH ONCE DAILY   Patient taking differently: Take 10 mg by mouth daily    EPINEPHrine (ANY BX GENERIC EQUIV) 0.3 MG/0.3ML injection 2-pack  Other No No   Sig: Inject 0.3 mLs (0.3 mg) into the muscle once as needed for anaphylaxis   VITAMIN D3 25 MCG (1000 UT) tablet  Other No No   Sig: TAKE ONE TABLET BY MOUTH ONCE DAILY   Patient taking differently: Take 1 tablet by mouth every evening    acetaminophen (TYLENOL) 500 MG tablet  Other Yes No   Sig: Take 500-1,000 mg by mouth every 6 hours as needed for mild pain   albuterol (PROAIR HFA/PROVENTIL HFA/VENTOLIN HFA) 108 (90 Base) MCG/ACT inhaler  Other No No   Sig: INHALE TWO PUFFS BY MOUTH EVERY 6 HOURS AS NEEDED FOR SHORTNESS OF BREATH DIFFICULTY BREATHING OR WHEEZING   Patient taking differently: Inhale 2 puffs into the lungs every 6 hours as needed for shortness of breath / dyspnea or wheezing    albuterol (PROVENTIL) (2.5 MG/3ML) 0.083% neb solution  Other No No   Sig: Take 1 vial (2.5 mg) by nebulization every 6 hours as needed for shortness of breath / dyspnea or wheezing   alcohol swab prep pads  Other No No   Sig: Use to swab area of injection/mike as directed.   allopurinol (ZYLOPRIM) 300 MG tablet  Other No No   Sig: TAKE ONE TABLET BY MOUTH ONCE DAILY   Patient taking differently: Take 300 mg by mouth daily    blood glucose (NO BRAND SPECIFIED) test strip  Other No No   Sig: Use to test blood sugar 2-3x/day. To accompany: Blood Glucose Monitor Brands: per insurance.   blood glucose calibration (NO BRAND SPECIFIED) solution  Other No No   Sig: To accompany: Blood Glucose Monitor Brands: per insurance.   blood glucose monitoring (NO BRAND SPECIFIED) meter device kit  Other No No   Sig: Use to test blood sugar daily or as directed. Preferred blood glucose meter OR supplies to accompany: Blood Glucose Monitor Brands: per insurance.   buPROPion (WELLBUTRIN XL) 150 MG 24 hr tablet  Other No No   Sig: TAKE ONE TABLET BY  MOUTH EVERY EVENING   furosemide (LASIX) 40 MG tablet   Yes No   Sig: Take 60 mg every morning and 40 mg every afternoon, reassess dosage at follow-up with primary care provider   gabapentin (NEURONTIN) 300 MG capsule  Other No No   Sig: Take 1 capsule (300 mg) by mouth At Bedtime TAKE ONE CAPSULE BY MOUTH ONCE DAILY AT BEDTIME WITH A 600 MG TABLET FOR A TOTAL DOSE  MG   gabapentin (NEURONTIN) 600 MG tablet  Other No No   Sig: TAKE ONE TABLET BY MOUTH THREE TIMES A DAY   Patient taking differently: Take 600 mg by mouth 3 times daily TAKE ONE TABLET BY MOUTH THREE TIMES A DAY   ipratropium - albuterol 0.5 mg/2.5 mg/3 mL (DUONEB) 0.5-2.5 (3) MG/3ML neb solution   No No   Sig: INHALE 1 VIAL VIA NEBULIZER EVERY 6 HOURS AS NEEDED FOR FOR SHORTNESS OF BREATH / DYSPNEA OR WHEEZING   lamoTRIgine (LAMICTAL) 100 MG tablet  Other No No   Sig: TAKE ONE TABLET BY MOUTH ONCE DAILY   Patient taking differently: Take 100 mg by mouth every evening    levothyroxine (SYNTHROID/LEVOTHROID) 175 MCG tablet   No No   Sig: TAKE ONE TABLET BY MOUTH ONCE DAILY   mometasone-formoterol (DULERA) 200-5 MCG/ACT oral inhaler  Other No No   Sig: Inhale 2 puffs into the lungs 2 times daily   nitroGLYcerin (NITROSTAT) 0.4 MG sublingual tablet   No No   Sig: For chest pain place 1 tablet under the tongue every 5 minutes for 3 doses. If symptoms persist 5 minutes after 1st dose call 911.   omeprazole (PRILOSEC) 20 MG DR capsule  Other No No   Sig: Take 1 capsule (20 mg) by mouth 2 times daily   order for DME  Other No No   Sig: Equipment being ordered: INCONTINENCE PADS   QID   order for DME  Other No No   Sig: Equipment being ordered: DEPENDS SIZE LARGE   order for DME  Other Yes No   Sig: Equipment being ordered: CPAP  AIRSENSE 10  5-18 CM H20  # [id number]   DN# [id number]   order for DME  Other No No   Sig: Equipment being ordered: Nebulizer   order for DME  Other No No   Sig: Equipment being ordered:x2 [employer] 30/40mmHg compression wraps with x2  extra prs of compression liners   potassium chloride ER (KLOR-CON M) 10 MEQ CR tablet  Other No No   Sig: TAKE ONE TABLET BY MOUTH ONCE DAILY   Patient taking differently: Take 10 mEq by mouth every evening    pramipexole (MIRAPEX) 0.125 MG tablet  Other No No   Sig: Take 1-20 tablets (0.125-2.5 mg) by mouth At Bedtime TAKE 1-2 TABLETS BY MOUTH AT BEDTIME   Patient taking differently: Take 0.125-0.25 mg by mouth At Bedtime TAKE 1-2 TABLETS BY MOUTH AT BEDTIME   simvastatin (ZOCOR) 20 MG tablet  Other No No   Sig: TAKE ONE TABLET BY MOUTH EVERY NIGHT AT BEDTIME   Patient taking differently: Take 20 mg by mouth At Bedtime    thin (NO BRAND SPECIFIED) lancets  Other No No   Sig: Use with lanceting device. To accompany: Blood Glucose Monitor Brands: per insurance.   traZODone (DESYREL) 150 MG tablet  Other No No   Sig: TAKE ONE TABLET BY MOUTH EVERY NIGHT AT BEDTIME   Patient taking differently: Take 150 mg by mouth At Bedtime    warfarin ANTICOAGULANT (JANTOVEN ANTICOAGULANT) 5 MG tablet  Other No No   Sig: TAKE TWO AND ONE-HALF TABLETS BY MOUTH EVERY MONDAY, TWO TABLETS ON ALL OTHER DAYS OR AS DIRECTED BY ANTICOAGULATION CLINIC   Patient taking differently: Take by mouth every evening TAKE TWO AND ONE-HALF TABLETS BY MOUTH EVERY MONDAY, TWO TABLETS ON ALL OTHER DAYS OR AS DIRECTED BY ANTICOAGULATION CLINIC      Facility-Administered Medications: None     Allergies   Allergies   Allergen Reactions     Darvocet [Propoxyphene N-Apap] Anaphylaxis     Darvocet,Percocet      Percocet [Oxycodone-Acetaminophen] Anaphylaxis, Hives and Swelling     Has tolerated hydromorphone in the past.      Asa [Aspirin] Hives     Aspirin causes seizures and hives, throat swelling.   Has tolerated ketorolac in the past.        Bee      Ibuprofen Swelling     Throat swelling per patient      Lyrica [Pregabalin] Dizziness and Swelling     Povidone Iodine Rash     Reaction to topical betadine     Tape [Adhesive Tape] Rash       Family History     Family History   Problem Relation Age of Onset     Hypertension Mother      Diabetes Mother      Blood Disease Mother      Heart Disease Mother         chf     Cerebrovascular Disease Mother      Hypertension Father      Cancer Father         lung cancer     Allergies Sister      Diabetes Sister      Depression Sister      Hypertension Sister        Social History   Social History     Socioeconomic History     Marital status:      Spouse name: Not on file     Number of children: Not on file     Years of education: Not on file     Highest education level: Not on file   Occupational History     Occupation: Homemaker   Social Needs     Financial resource strain: Not very hard     Food insecurity     Worry: Never true     Inability: Never true     Transportation needs     Medical: No     Non-medical: No   Tobacco Use     Smoking status: Current Every Day Smoker     Packs/day: 0.50     Years: 43.00     Pack years: 21.50     Types: Cigarettes     Smokeless tobacco: Never Used     Tobacco comment: started at age 11.  Quit during pregnancyX4.  Quit 3 months ago.    Substance and Sexual Activity     Alcohol use: No     Alcohol/week: 0.0 standard drinks     Comment: quit 1995     Drug use: No     Comment: past hx 22 years ago/ uppers downers, canibus     Sexual activity: Not Currently     Birth control/protection: Surgical   Lifestyle     Physical activity     Days per week: Not on file     Minutes per session: Not on file     Stress: Not on file   Relationships     Social connections     Talks on phone: Not on file     Gets together: Not on file     Attends Jain service: Not on file     Active member of club or organization: Not on file     Attends meetings of clubs or organizations: Not on file     Relationship status: Not on file     Intimate partner violence     Fear of current or ex partner: Not on file     Emotionally abused: Not on file     Physically abused: Not on file     Forced sexual activity: Not  "on file   Other Topics Concern      Service No     Blood Transfusions No     Caffeine Concern Yes     Comment: 5 cans, 3 cups a day     Occupational Exposure No     Hobby Hazards No     Sleep Concern No     Stress Concern No     Weight Concern Yes     Special Diet No     Back Care Yes     Comment: Dr. Rl mederos 1 time a month     Exercise Yes     Bike Helmet No     Seat Belt No     Self-Exams No     Parent/sibling w/ CABG, MI or angioplasty before 65F 55M? No   Social History Narrative    Lives in [place] with a friend who has been helping since she fell. Not working       Physical Exam   /57   Pulse 87   Temp 97.1  F (36.2  C) (Temporal)   Resp 16   Ht 1.473 m (4' 10\")   Wt 101.2 kg (223 lb)   LMP 09/27/2009   SpO2 92%   BMI 46.61 kg/m       Weight: 223 lbs 0 oz Body mass index is 46.61 kg/m .     Constitutional:  Somnolent.  Alert, oriented, cooperative, appears nontoxic, speaking in full sentences, appears stated age.  Eyes: Eyes are clear, pupils are reactive. No scleral icterus. Extroccular movements intact. Glasses.   HEENT: Oropharynx is clear and moist. Normocephalic, no evidence of cranial trauma.   Cardiovascular: irregular rhythm, regular rate. , Murmur appreciated. Peripheral pulses intact bilaterally. Bilateral lower ext edema is present, is difficult to assess degree of edema as patient's legs are wrapped from lymphedema treatment today.   Respiratory: Lung sounds are diminished. Using accessory mm to breathe.   GI: obese. Soft, non-distended. Non-tender, no rebound or guarding. No hepatosplenomegaly or masses appreciated. Normal bowel sounds.   Musculoskeletal: Without obvious deformity, moves all extremities approprietly. Normal muscle bulk and tone.   Skin: Warm and dry, no rashes or ecchymoses. Bilateral lymphedema wraps in place.   Neurologic: Neck supple. Patient moves all extremities. Cranial nerves are grossly intact.  is symmetric. Gross strength and " sensation are equal in bilateral upper and lower extremities.   Genitourinary: Deferre      Data   Data reviewed today:   Recent Labs   Lab 12/31/20  1345 12/31/20  1210   WBC 7.2  --    HGB 11.7  --    MCV 85  --    *  --    INR  --  3.94*     --    POTASSIUM 3.4  --    CHLORIDE 102  --    CO2 34*  --    BUN 11  --    CR 0.69  --    ANIONGAP 3  --    HOOD 8.8  --    *  --    TROPI 0.021  --        Recent Results (from the past 24 hour(s))   XR Chest Port 1 View    Narrative    CHEST ONE VIEW PORTABLE  12/31/2020 3:10 PM     HISTORY: Cough, post-tussive emesis, lightheaded, occasional chest  pain.    COMPARISON: Chest x-ray on 11/17/2020      Impression    IMPRESSION: Enlarged cardiac silhouette and mild pulmonary vascular  congestion. No significant pleural effusion or pneumothorax.

## 2020-12-31 NOTE — ED NOTES
Pt has laid back in bed and O2 sats dropped to 80% on room air pt sat back up and placed on 2L O2 via [place] and sats recovered after a few minutes. MD updated

## 2020-12-31 NOTE — ED NOTES
DATE:  12/31/2020   TIME OF RECEIPT FROM LAB:  8158  LAB TEST:  PCO2   LAB VALUE:  80  RESULTS GIVEN WITH READ-BACK TO (PROVIDER):  Baldo Ellis MD  TIME LAB VALUE REPORTED TO PROVIDER:   5949

## 2020-12-31 NOTE — ED NOTES
Pt presents to the ED for complaint of dizziness and cough. Pt states she was and the pulmonary edema clinic and the lymphedema clinic today. She states she was feeling great when she arrived there, but at about 1030 she started to develop a cough leading her to vomiting and feeling dizzy. States she had heart failure one month ago and is seeing pulmonary rehab for this. She has not noted any worsening peripheral edema. She hs hx of diabetes, current smoker. deneis O2 use at home. Pt is noted to have a weak wet cough.

## 2020-12-31 NOTE — ED NOTES
Pt resting in room. Report Continued feeling of dizziness/lightheadedness. Pt updated on POC. No additional needs.

## 2021-01-01 LAB
ANION GAP SERPL CALCULATED.3IONS-SCNC: <1 MMOL/L (ref 3–14)
BASE EXCESS BLDV CALC-SCNC: 9.4 MMOL/L
BUN SERPL-MCNC: 14 MG/DL (ref 7–30)
CALCIUM SERPL-MCNC: 8.7 MG/DL (ref 8.5–10.1)
CHLORIDE SERPL-SCNC: 104 MMOL/L (ref 94–109)
CO2 SERPL-SCNC: 36 MMOL/L (ref 20–32)
CREAT SERPL-MCNC: 0.72 MG/DL (ref 0.52–1.04)
ERYTHROCYTE [DISTWIDTH] IN BLOOD BY AUTOMATED COUNT: 20.4 % (ref 10–15)
GFR SERPL CREATININE-BSD FRML MDRD: >90 ML/MIN/{1.73_M2}
GLUCOSE BLDC GLUCOMTR-MCNC: 139 MG/DL (ref 70–99)
GLUCOSE BLDC GLUCOMTR-MCNC: 148 MG/DL (ref 70–99)
GLUCOSE BLDC GLUCOMTR-MCNC: 196 MG/DL (ref 70–99)
GLUCOSE SERPL-MCNC: 130 MG/DL (ref 70–99)
HCO3 BLDV-SCNC: 37 MMOL/L (ref 21–28)
HCT VFR BLD AUTO: 39.5 % (ref 35–47)
HGB BLD-MCNC: 10.9 G/DL (ref 11.7–15.7)
INR PPP: 3.26 (ref 0.86–1.14)
MCH RBC QN AUTO: 23.9 PG (ref 26.5–33)
MCHC RBC AUTO-ENTMCNC: 27.6 G/DL (ref 31.5–36.5)
MCV RBC AUTO: 86 FL (ref 78–100)
O2/TOTAL GAS SETTING VFR VENT: ABNORMAL %
PCO2 BLDV: 69 MM HG (ref 40–50)
PH BLDV: 7.34 PH (ref 7.32–7.43)
PLATELET # BLD AUTO: 95 10E9/L (ref 150–450)
PO2 BLDV: 32 MM HG (ref 25–47)
POTASSIUM SERPL-SCNC: 3.7 MMOL/L (ref 3.4–5.3)
PROCALCITONIN SERPL-MCNC: <0.05 NG/ML
RBC # BLD AUTO: 4.57 10E12/L (ref 3.8–5.2)
SODIUM SERPL-SCNC: 140 MMOL/L (ref 133–144)
WBC # BLD AUTO: 6.3 10E9/L (ref 4–11)

## 2021-01-01 PROCEDURE — 250N000012 HC RX MED GY IP 250 OP 636 PS 637: Performed by: PHYSICIAN ASSISTANT

## 2021-01-01 PROCEDURE — 250N000011 HC RX IP 250 OP 636: Performed by: INTERNAL MEDICINE

## 2021-01-01 PROCEDURE — 250N000011 HC RX IP 250 OP 636: Performed by: PHYSICIAN ASSISTANT

## 2021-01-01 PROCEDURE — 80048 BASIC METABOLIC PNL TOTAL CA: CPT | Performed by: PHYSICIAN ASSISTANT

## 2021-01-01 PROCEDURE — 94640 AIRWAY INHALATION TREATMENT: CPT | Mod: 76

## 2021-01-01 PROCEDURE — 999N000157 HC STATISTIC RCP TIME EA 10 MIN

## 2021-01-01 PROCEDURE — 250N000013 HC RX MED GY IP 250 OP 250 PS 637: Performed by: PHYSICIAN ASSISTANT

## 2021-01-01 PROCEDURE — 250N000013 HC RX MED GY IP 250 OP 250 PS 637: Performed by: INTERNAL MEDICINE

## 2021-01-01 PROCEDURE — 85610 PROTHROMBIN TIME: CPT | Performed by: PHYSICIAN ASSISTANT

## 2021-01-01 PROCEDURE — 85027 COMPLETE CBC AUTOMATED: CPT | Performed by: PHYSICIAN ASSISTANT

## 2021-01-01 PROCEDURE — 250N000009 HC RX 250: Performed by: INTERNAL MEDICINE

## 2021-01-01 PROCEDURE — 250N000009 HC RX 250: Performed by: PHYSICIAN ASSISTANT

## 2021-01-01 PROCEDURE — 94640 AIRWAY INHALATION TREATMENT: CPT

## 2021-01-01 PROCEDURE — 99233 SBSQ HOSP IP/OBS HIGH 50: CPT | Performed by: INTERNAL MEDICINE

## 2021-01-01 PROCEDURE — 999N001017 HC STATISTIC GLUCOSE BY METER IP

## 2021-01-01 PROCEDURE — 36415 COLL VENOUS BLD VENIPUNCTURE: CPT | Performed by: PHYSICIAN ASSISTANT

## 2021-01-01 PROCEDURE — 200N000001 HC R&B ICU

## 2021-01-01 PROCEDURE — 82803 BLOOD GASES ANY COMBINATION: CPT | Performed by: PHYSICIAN ASSISTANT

## 2021-01-01 RX ORDER — WARFARIN SODIUM 7.5 MG
3.75 TABLET ORAL
Status: COMPLETED | OUTPATIENT
Start: 2021-01-01 | End: 2021-01-01

## 2021-01-01 RX ORDER — ACETAMINOPHEN 325 MG/1
325 TABLET ORAL EVERY 4 HOURS PRN
Status: DISCONTINUED | OUTPATIENT
Start: 2021-01-01 | End: 2021-01-03 | Stop reason: HOSPADM

## 2021-01-01 RX ADMIN — ACETAMINOPHEN 325 MG: 325 TABLET, FILM COATED ORAL at 23:39

## 2021-01-01 RX ADMIN — OMEPRAZOLE 20 MG: 20 CAPSULE, DELAYED RELEASE ORAL at 19:49

## 2021-01-01 RX ADMIN — ARIPIPRAZOLE 10 MG: 10 TABLET ORAL at 07:31

## 2021-01-01 RX ADMIN — BUPROPION HYDROCHLORIDE 150 MG: 150 TABLET, FILM COATED, EXTENDED RELEASE ORAL at 16:53

## 2021-01-01 RX ADMIN — TRAZODONE HYDROCHLORIDE 150 MG: 100 TABLET ORAL at 22:53

## 2021-01-01 RX ADMIN — FUROSEMIDE 40 MG: 10 INJECTION, SOLUTION INTRAMUSCULAR; INTRAVENOUS at 13:38

## 2021-01-01 RX ADMIN — LAMOTRIGINE 100 MG: 100 TABLET ORAL at 16:53

## 2021-01-01 RX ADMIN — GABAPENTIN 600 MG: 600 TABLET, FILM COATED ORAL at 19:49

## 2021-01-01 RX ADMIN — IPRATROPIUM BROMIDE AND ALBUTEROL SULFATE 3 ML: .5; 3 SOLUTION RESPIRATORY (INHALATION) at 07:40

## 2021-01-01 RX ADMIN — IPRATROPIUM BROMIDE AND ALBUTEROL SULFATE 3 ML: .5; 3 SOLUTION RESPIRATORY (INHALATION) at 11:19

## 2021-01-01 RX ADMIN — PRAMIPEXOLE DIHYDROCHLORIDE 0.12 MG: 0.12 TABLET ORAL at 22:54

## 2021-01-01 RX ADMIN — IPRATROPIUM BROMIDE AND ALBUTEROL SULFATE 3 ML: .5; 3 SOLUTION RESPIRATORY (INHALATION) at 15:49

## 2021-01-01 RX ADMIN — OMEPRAZOLE 20 MG: 20 CAPSULE, DELAYED RELEASE ORAL at 07:32

## 2021-01-01 RX ADMIN — LEVOTHYROXINE SODIUM 175 MCG: 50 TABLET ORAL at 06:09

## 2021-01-01 RX ADMIN — INSULIN ASPART 2 UNITS: 100 INJECTION, SOLUTION INTRAVENOUS; SUBCUTANEOUS at 12:04

## 2021-01-01 RX ADMIN — GABAPENTIN 600 MG: 600 TABLET, FILM COATED ORAL at 13:38

## 2021-01-01 RX ADMIN — IPRATROPIUM BROMIDE AND ALBUTEROL SULFATE 3 ML: .5; 3 SOLUTION RESPIRATORY (INHALATION) at 20:03

## 2021-01-01 RX ADMIN — SIMVASTATIN 20 MG: 20 TABLET, FILM COATED ORAL at 22:53

## 2021-01-01 RX ADMIN — FUROSEMIDE 40 MG: 10 INJECTION, SOLUTION INTRAMUSCULAR; INTRAVENOUS at 22:53

## 2021-01-01 RX ADMIN — FLUTICASONE FUROATE AND VILANTEROL TRIFENATATE 1 PUFF: 200; 25 POWDER RESPIRATORY (INHALATION) at 07:32

## 2021-01-01 RX ADMIN — ALLOPURINOL 300 MG: 300 TABLET ORAL at 07:31

## 2021-01-01 RX ADMIN — FUROSEMIDE 40 MG: 10 INJECTION, SOLUTION INTRAMUSCULAR; INTRAVENOUS at 06:09

## 2021-01-01 RX ADMIN — Medication 3.75 MG: at 17:42

## 2021-01-01 RX ADMIN — GABAPENTIN 600 MG: 600 TABLET, FILM COATED ORAL at 07:32

## 2021-01-01 ASSESSMENT — ACTIVITIES OF DAILY LIVING (ADL)
ADLS_ACUITY_SCORE: 16
ADLS_ACUITY_SCORE: 15

## 2021-01-01 ASSESSMENT — MIFFLIN-ST. JEOR: SCORE: 1476.75

## 2021-01-01 NOTE — PROGRESS NOTES
VSS. Pt appears to be resting well on her home CPAP with O2 entrained. Goldsmith catheter intact and patent, pt receiving lasix IVP for diuresis. Pt states comfort, will continue to monitor.

## 2021-01-01 NOTE — PROGRESS NOTES
"[place] [facility] ADMISSION NOTE    Patient admitted to room 1005 at approximately 1920 via cart from emergency room. Patient was accompanied by transport tech and nurse.     Verbal SBAR report received from Radha BELCHER RN prior to patient arrival.     Patient transferred to bed via self. Patient alert and oriented X 3. The patient is not having any pain.  . Admission vital signs: Blood pressure (!) 151/81, pulse 92, temperature 98  F (36.7  C), temperature source Oral, resp. rate 18, height 1.473 m (4' 10\"), weight 99.9 kg (220 lb 3.8 oz), last menstrual period 09/27/2009, SpO2 95 %, not currently breastfeeding. Patient was oriented to plan of care, call light, bed controls, tv, telephone, bathroom and visiting hours.     Risk Assessment    The following safety risks were identified during admission: fall. Yellow risk band applied: YES.     Skin Initial Assessment    This writer admitted this patient and completed a full skin assessment and Brandon score in the Adult PCS flowsheet. Appropriate interventions initiated as needed.     Secondary skin check completed by Marie LUGO RN.         Education    Patient has a [place] to Observation order: No  Observation education completed and documented: N/A      Machelle Melara RN    "

## 2021-01-01 NOTE — ED PROVIDER NOTES
History     Chief Complaint   Patient presents with     Dizziness     was at pulmonary clinic and became dizzy = here with vomiting now (witnessed cough until vomiting) - denies any fever or other issues at this time.     Cough     HPI  Bia Bill is a 55 year old female who signed out to me at change of shift by Dr. Marcos, see his note for bulk of history and details regarding this case.  Patient presents here today secondary to difficulty breathing, she describes feeling nauseated lightheaded with activity.  She presented here today for pulmonary rehab, describes some vertigo sensation when she sits upright.  Tells me her breathing is been very difficult, she is coughing producing clear phlegm.  She describes 6/10 central substernal chest discomfort that does not radiate, she describes nausea.  She has not taken aspirin secondary to allergy, reports hives and respiratory distress in the past.  She lives in a trailer house and is able to walk the length of the house 2-3 times and then becomes dyspneic and has to rest.  She describes salty ham dinner on Christmas, reports continuing urinary outputs although none here thus far, received 40 mg furosemide IV about an hour prior to my evaluation.  Tells me that weight about a week and a half ago was 232, weight today 225 per her report.  Denies febrile illness, diarrhea, black or bloody stool, urinary symptoms.  Chronic longstanding lymphedema had her legs wrapped today, no reported redness or breakdown.    I was asked to reevaluate this patient after venous gas returned with pH of 7.26, PCO2 of 80, PO2 of 35, bicarb elevated at 36, 2 L nasal cannula oxygen.    Allergies:  Allergies   Allergen Reactions     Darvocet [Propoxyphene N-Apap] Anaphylaxis     Darvocet,Percocet      Percocet [Oxycodone-Acetaminophen] Anaphylaxis, Hives and Swelling     Has tolerated hydromorphone in the past.      Asa [Aspirin] Hives     Aspirin causes seizures and hives, throat  swelling.   Has tolerated ketorolac in the past.        Bee      Ibuprofen Swelling     Throat swelling per patient      Lyrica [Pregabalin] Dizziness and Swelling     Povidone Iodine Rash     Reaction to topical betadine     Tape [Adhesive Tape] Rash       Problem List:    Patient Active Problem List    Diagnosis Date Noted     CHF (congestive heart failure) (H) 12/31/2020     Priority: Medium     Acute respiratory failure with hypoxia (H) 12/31/2020     Priority: Medium     Pulmonary hypertension (H) 12/31/2020     Priority: Medium     Acute on chronic right-sided heart failure (H) 12/31/2020     Priority: Medium     Near syncope 12/31/2020     Priority: Medium     Exacerbation of asthma, unspecified asthma severity, unspecified whether persistent 12/31/2020     Priority: Medium     Hypoxia 11/17/2020     Priority: Medium     Acute cor pulmonale (H) 11/17/2020     Priority: Medium     Acute congestive heart failure, unspecified heart failure type (H) 11/08/2020     Priority: Medium     Chronic respiratory failure with hypercapnia (H) 11/08/2020     Priority: Medium     VBG 11/8/2020 7.36/61/16       Hyperlipidemia 11/08/2020     Priority: Medium     Elevated troponin 11/08/2020     Priority: Medium     Thrombocytopenia (H) 06/22/2020     Priority: Medium     Chronic since 2008. 'Stable' splenomegally on CT 11/3/2020       Type 2 diabetes mellitus (H) 12/10/2019     Priority: Medium     A1c Oct 2019 = 6.1       Acute respiratory failure with hypoxia and hypercapnia (H) 10/29/2019     Priority: Medium     Lymphedema of both lower extremities 10/29/2019     Priority: Medium     Chronic gout without tophus 10/03/2018     Priority: Medium     Conductive hearing loss of left ear with restricted hearing of right ear 04/17/2018     Priority: Medium     Sensorineural hearing loss (SNHL) of right ear with restricted hearing of left ear 04/17/2018     Priority: Medium     Cyst of left ovary 01/11/2016     Priority: Medium      Morbid obesity (H) 06/17/2015     Priority: Medium     Vitamin D deficiency 11/05/2014     Priority: Medium     Somatization disorder 07/10/2014     Priority: Medium     Benign neoplasm of colon (POLYPOSIS) 06/04/2014     Priority: Medium     Notes Recorded by Moris Thomas MD on 4/25/2014 at 1:36 PM- Adenomatous polyp colonoscopy 5 years  Collected: 11/7/2017 Colon at 30 cm, mucosal biopsy: Hyperplastic polyp, with focal changes suggestive of sessile serrated       DDD (degenerative disc disease), cervical 12/17/2013     Priority: Medium     [facility] 09/03/2013     Priority: Medium     Status:  Accepted  Care Coordinator:  Alla Fernandez    See Letters for [facility] Care Plan  Date:  October 20, 2014         Rosacea 08/26/2013     Priority: Medium     Moderate mixed bipolar I disorder (H) 10/13/2011     Priority: Medium     Personality disorder, depressive 10/13/2011     Priority: Medium     Tobacco use disorder 04/01/2011     Priority: Medium     GILES (Obstructive Sleep Apnea)/ Obesity Hypoventilation 03/01/2010     Priority: Medium     Sleep study 2/24/2010 (209#) - AHI 15.7, RDI 48.3, low O2 86%, PLMI 114  [facility] Sleep study 1/6/20 (241#) - BIPAP 22/7 and 23/8 with improvement in events and hypoventilation        RLS (restless legs syndrome) 03/01/2010     Priority: Medium     Ferritin 11.       Fatty liver 08/28/2009     Priority: Medium     US on 8/26/2009        Hypothyroidism 05/13/2008     Priority: Medium     was hyperthyroid - Hashimoto's and graves and had iodine treatment done at [place] in early 2008.    Now hypothyroid - is following at [place] [facility], on 125mcg levothyroxine - see scanned documents       Esophageal reflux 04/29/2008     Priority: Medium     Developmental reading disorder 09/12/2007     Priority: Medium     Chondromalacia of patella 02/27/2007     Priority: Medium     Secondary polycythemia      Priority: Medium     Betheda HGB, a high-oxygen affinity hemoglobin which  results in a normal compensatory erythrocytosis.    Secondary erythrocytosis likely related to high oxygen affinity hemoglobinopathy ([place] hemoglobin). Patients with high oxygen affinity hemoglobin typically have higher hematocrit level from compensatory erythrocytosis and as long as they are asymptomatic they do not need phlebotomy. Given her previous history of 'peripheral artery disease' and 'stroke' Hematology recommended phlebotomy monthly with goal HCT<55% 2014.        Alcohol abuse, in remission 08/01/2006     Priority: Medium     Quit 1996 after court ordered, lost her children,        Mild persistent asthma 07/11/2006     Priority: Medium     PFT 9/13/2013  FEV1 moderately reduced. FVC moderately reduced. FEV1/FVC ratio normal. 2. Bronchodilator Response: No significant change is seen with bronchodilators. Not consistent with COPD       Chronic leg pain 07/11/2006     Priority: Medium     Has had chronic pain in the left groin and upper leg secondary to a blood clot in the thigh, treated with neurontin without benefit, still has chronic pain since 2005, sharp shooting intermittantly.       History of deep venous thrombosis 07/11/2006     Priority: Medium     She had a DVT post pregnancy and delivery in 1992.   Hospitalized at [facility] for extensive left lower leg DVT 3/26/2004: This occurred a month after pneumonia episode and decreased activity.  She had lytic therapy, tPA followed by Possis mechanical thrombectomy device and three stents in veins.  She did have [place] catheter at the time.  There was a positive lupus anticoagulant, negative Factor V and cardiolipin clement.           Past Medical History:    Past Medical History:   Diagnosis Date     Acromioclavicular joint arthritis 01/25/2017     Acute bronchospasm 08/15/2016     Acute gouty arthritis 08/04/2014     Anxiety state, unspecified      Bipolar I disorder, most recent episode (or current) unspecified      Closed fracture of metatarsal bone  06/05/2007     COPD exacerbation (H) 10/29/2019     Depressive disorder, not elsewhere classified      DVT, lower extremity (H)      Headache 10/17/2014     History of stroke symptoms, stroke ruled out on imaging 2011     Impingement syndrome of shoulder region 01/25/2017     Polycythemia, secondary      Right shoulder pain 10/14/2014     Trochanteric bursitis of both hips 01/11/2016       Past Surgical History:    Past Surgical History:   Procedure Laterality Date     BONE MARROW BIOPSY, BONE SPECIMEN, NEEDLE/TROCAR N/A 11/17/2014    Procedure: BIOPSY BONE MARROW;  Surgeon: Hay Aaron MD;  Location: [facility]     D & C  10/26/2009    with uterine ablation     ENDOVASCULAR PLACEMENT VASCULAR DEVICE Left 2004    Left leg venous stent x 3     ESOPHAGOSCOPY, GASTROSCOPY, DUODENOSCOPY (EGD), COMBINED  04/21/2014    Procedure: Gastroscopy;  Surgeon: Moris Thomas MD;  Location: [facility]     HYSTERECTOMY, PAP NO LONGER INDICATED  01/04/2010     LAPAROSCOPIC CHOLECYSTECTOMY N/A 01/04/2019    Procedure: Laparoscopic cholecystectomy;  Surgeon: Aaron Siegel MD;  Location: [facility]     LITHOTRIPSY  2004    Lithotrypsy       Family History:    Family History   Problem Relation Age of Onset     Hypertension Mother      Diabetes Mother      Blood Disease Mother      Heart Disease Mother         chf     Cerebrovascular Disease Mother      Hypertension Father      Cancer Father         lung cancer     Allergies Sister      Diabetes Sister      Depression Sister      Hypertension Sister        Social History:  Marital Status:   [4]  Social History     Tobacco Use     Smoking status: Current Every Day Smoker     Packs/day: 0.50     Years: 43.00     Pack years: 21.50     Types: Cigarettes     Smokeless tobacco: Never Used     Tobacco comment: started at age 11.  Quit during pregnancyX4.  Quit 3 months ago.    Substance Use Topics     Alcohol use: No     Alcohol/week: 0.0 standard drinks     Comment: quit 1995  "    Drug use: No     Comment: past hx 22 years ago/ uppers downers, canibus        Medications:    No current outpatient medications on file.        Review of Systems  All other systems reviewed and are negative.    Physical Exam   BP: (!) 154/71  Pulse: 86  Temp: 97.1  F (36.2  C)  Resp: 16  Height: 147.3 cm (4' 10\")  Weight: 101.2 kg (223 lb)  SpO2: 93 %      Physical Exam  Nontoxic appearing mild respiratory distress in the form of resting tachypnea rate 25, alert although slow to answer questions.  Head atraumatic normocephalic  TMs/EACs unremarkable, conjunctiva noninjected, oropharynx moist without lesions or erythema  Lungs diminished breath sounds throughout, rales two thirds of the way up on both sides  Heart regular no murmur  Abdomen soft nontender morbidly obese bowel sounds positive   Strength and sensation grossly intact throughout the extremities, able to go from semirecumbent to sitting upright on her own  Speech is fluent, good eye contact, thought processes are rational  Lower extremities with edema and wraps in place    ED Course        Procedures  Repeat ECG sinus rhythm rate 80, partial right bundle, no acute ST-T wave changes, read by Dr. Roger Condon             Critical Care time:  none               Results for orders placed or performed during the hospital encounter of 12/31/20 (from the past 24 hour(s))   D dimer quantitative   Result Value Ref Range    D Dimer <0.3 0.0 - 0.50 ug/ml FEU   CBC with platelets, differential   Result Value Ref Range    WBC 7.2 4.0 - 11.0 10e9/L    RBC Count 4.93 3.8 - 5.2 10e12/L    Hemoglobin 11.7 11.7 - 15.7 g/dL    Hematocrit 42.1 35.0 - 47.0 %    MCV 85 78 - 100 fl    MCH 23.7 (L) 26.5 - 33.0 pg    MCHC 27.8 (L) 31.5 - 36.5 g/dL    RDW 20.9 (H) 10.0 - 15.0 %    Platelet Count 110 (L) 150 - 450 10e9/L    Diff Method Automated Method     % Neutrophils 79.6 %    % Lymphocytes 12.3 %    % Monocytes 6.4 %    % Eosinophils 0.7 %    % Basophils 0.4 %    % " Immature Granulocytes 0.6 %    Nucleated RBCs 0 0 /100    Absolute Neutrophil 5.8 1.6 - 8.3 10e9/L    Absolute Lymphocytes 0.9 0.8 - 5.3 10e9/L    Absolute Monocytes 0.5 0.0 - 1.3 10e9/L    Absolute Eosinophils 0.1 0.0 - 0.7 10e9/L    Absolute Basophils 0.0 0.0 - 0.2 10e9/L    Abs Immature Granulocytes 0.0 0 - 0.4 10e9/L    Absolute Nucleated RBC 0.0    Basic metabolic panel   Result Value Ref Range    Sodium 139 133 - 144 mmol/L    Potassium 3.4 3.4 - 5.3 mmol/L    Chloride 102 94 - 109 mmol/L    Carbon Dioxide 34 (H) 20 - 32 mmol/L    Anion Gap 3 3 - 14 mmol/L    Glucose 124 (H) 70 - 99 mg/dL    Urea Nitrogen 11 7 - 30 mg/dL    Creatinine 0.69 0.52 - 1.04 mg/dL    GFR Estimate >90 >60 mL/min/[1.73_m2]    GFR Estimate If Black >90 >60 mL/min/[1.73_m2]    Calcium 8.8 8.5 - 10.1 mg/dL   Troponin I   Result Value Ref Range    Troponin I ES 0.021 0.000 - 0.045 ug/L   Nt probnp inpatient   Result Value Ref Range    N-Terminal Pro BNP Inpatient 781 0 - 900 pg/mL   Symptomatic Influenza A/B & SARS-CoV2 (COVID-19) Virus PCR Multiplex    Specimen: Nasopharyngeal   Result Value Ref Range    Flu A/B & SARS-COV-2 PCR Source Nasopharyngeal     SARS-CoV-2 PCR Result NEGATIVE     Influenza A PCR Negative NEG^Negative    Influenza B PCR Negative NEG^Negative    Respiratory Syncytial Virus PCR (Note)     Flu A/B & SARS-CoV-2 PCR Comment (Note)    XR Chest Port 1 View    Narrative    CHEST ONE VIEW PORTABLE  12/31/2020 3:10 PM     HISTORY: Cough, post-tussive emesis, lightheaded, occasional chest  pain.    COMPARISON: Chest x-ray on 11/17/2020      Impression    IMPRESSION: Enlarged cardiac silhouette and mild pulmonary vascular  congestion. No significant pleural effusion or pneumothorax.    GILMER BATRES MD   Blood gas venous   Result Value Ref Range    Ph Venous 7.26 (L) 7.32 - 7.43 pH    PCO2 Venous 80 (HH) 40 - 50 mm Hg    PO2 Venous 35 25 - 47 mm Hg    Bicarbonate Venous 36 (H) 21 - 28 mmol/L    Base Excess Venous 6.4  mmol/L    FIO2 2L    Troponin I   Result Value Ref Range    Troponin I ES 0.026 0.000 - 0.045 ug/L   Glucose by meter   Result Value Ref Range    Glucose 178 (H) 70 - 99 mg/dL     *Note: Due to a large number of results and/or encounters for the requested time period, some results have not been displayed. A complete set of results can be found in Results Review.       Medications   albuterol (PROAIR HFA/PROVENTIL HFA/VENTOLIN HFA) 108 (90 Base) MCG/ACT inhaler 2 puff (has no administration in time range)   allopurinol (ZYLOPRIM) tablet 300 mg (has no administration in time range)   ARIPiprazole (ABILIFY) tablet 10 mg (has no administration in time range)   buPROPion (WELLBUTRIN XL) 24 hr tablet 150 mg (150 mg Oral Given 12/31/20 2148)   gabapentin (NEURONTIN) tablet 600 mg (600 mg Oral Given 12/31/20 2150)   ipratropium - albuterol 0.5 mg/2.5 mg/3 mL (DUONEB) neb solution 3 mL ( Nebulization Canceled Entry 12/31/20 2200)   lamoTRIgine (LaMICtal) tablet 100 mg (has no administration in time range)   levothyroxine (SYNTHROID/LEVOTHROID) tablet 175 mcg (has no administration in time range)   fluticasone-vilanterol (BREO ELLIPTA) 200-25 MCG/INH inhaler 1 puff (1 puff Inhalation Given 12/31/20 2149)   omeprazole (priLOSEC) CR capsule 20 mg (20 mg Oral Given 12/31/20 2150)   pramipexole (MIRAPEX) tablet 0.125-0.25 mg (0.25 mg Oral Given 12/31/20 2149)   simvastatin (ZOCOR) tablet 20 mg (20 mg Oral Given 12/31/20 2151)   traZODone (DESYREL) tablet 150 mg (150 mg Oral Given 12/31/20 2151)   Warfarin Therapy Reminder (Check START DATE - warfarin may be starting in the FUTURE) (has no administration in time range)   Patient is already receiving anticoagulation with heparin, enoxaparin (LOVENOX), warfarin (COUMADIN)  or other anticoagulant medication (has no administration in time range)   ondansetron (ZOFRAN-ODT) ODT tab 4 mg (has no administration in time range)     Or   ondansetron (ZOFRAN) injection 4 mg (has no  administration in time range)   polyethylene glycol (MIRALAX) Packet 17 g (has no administration in time range)   glucose gel 15-30 g (has no administration in time range)     Or   dextrose 50 % injection 25-50 mL (has no administration in time range)     Or   glucagon injection 1 mg (has no administration in time range)   insulin aspart (NovoLOG) injection (RAPID ACTING) (has no administration in time range)   insulin aspart (NovoLOG) injection (RAPID ACTING) (0 Units Subcutaneous Not Given 12/31/20 2200)   azithromycin (ZITHROMAX) 500 mg in sodium chloride 0.9 % 250 mL intermittent infusion (500 mg Intravenous New Bag 12/31/20 1831)   warfarin-No DOSE today (has no administration in time range)   furosemide (LASIX) injection 40 mg (40 mg Intravenous Given 12/31/20 2149)   methylPREDNISolone sodium succinate (solu-MEDROL) injection 60 mg (60 mg Intravenous Given 12/31/20 1621)   furosemide (LASIX) injection 40 mg (40 mg Intravenous Given 12/31/20 1621)       Assessments & Plan (with Medical Decision Making)  55-year-old female clinical evidence for congestive heart failure, history of right heart failure and pulmonary hypertension, tachypnea, respiratory acidosis on venous gas.  Repeat ECG without acute ischemic change, repeat troponin remains within normal.  Goldsmith catheter placed given need for accurate I/O, 300-400 cc obtained at placement.  Started on BiPAP given respiratory acidosis.  Admit to ICU for ongoing respiratory support, diuresis.  Reviewed with Leigh Patiño who accepts patient for hospitalist service.     I have reviewed the nursing notes.    I have reviewed the findings, diagnosis, plan and need for follow up with the patient.       Current Discharge Medication List          Final diagnoses:   Exacerbation of asthma, unspecified asthma severity, unspecified whether persistent   Pulmonary hypertension (H)   Acute on chronic right-sided heart failure (H)   Acute respiratory failure with hypoxia  (H)       12/31/2020   [facility] EMERGENCY DEPT     Roger Condon MD  12/31/20 [id number]

## 2021-01-01 NOTE — PROGRESS NOTES
VSS. Pt continues comfortable on home CPAP unit with O2 entrained. Pt appears to be resting/sleeping well since admssion. Pt having large outputs per anton following lasix given IVP Q 6 hrs. AM labs drawn, await results.

## 2021-01-01 NOTE — PROGRESS NOTES
[facility] Medicine Progress Note  Date of Service: 01/01/2021      Assessment & Plan   Bia Bill is a 55 year old female who presents on 12/31/2020 with cough and emesis.     Acute respiratory failure with hypoxia and hypercapnia     Onset of shortness of breath early today. In Ed she dipped into low 80s on room air, supplemental oxygen was applied. While she reports feeling mildly improved, she is still short of breath and using accessory muscles to breath. She admits to extreme fatigue. PH of 7.26, PCO2 80, bicarb 36. Patient was placed on BIPAP after VBG returned. Suspect heart failure exacerbation vs COPD/Asthma exacerbation vs PNA vs PE vs other. COVID and influenza negative. Trop 0.021--0.026, D dimer <0.03, WBC 7.2, . CXR with enlarged cardiac silhouette and pulmonary vascular congestion.     Admitted to ICU on BIPAP. Weaned off BiPAP and now off oxygen at rest. Still bilateral crackles and significant bilateral lower extremity edema. Procalcitonin negative. VpCO2 69 (improved), compensated.    - no further BiPAP needed, follow off oxygen   - stop azithromycin    Mild persistent asthma/COPD with possible acute exacerbation    Acute exacerbation could certainly be contributing to patient's worsened respiratory status. She was given 60 mg solumedrol in ED.   - Scheduled duonebs every 4 hours while awake.    - Continue prior to admission dulera (sub is breo ellipta).    - Per pharmacy: Patient has been taking both Dulera and Advair at home which is duplicate therapy. Please address upon discharge.     - no further steroids, treating heart failure as above    Acute exacerbation of right sided diastolic heart failure    Pulmonary hypertension     She had a NM lexiscan stress test on 11/25/2020: left ventricular EF at stress is 78%. Patient managed prior to admission with lasix 60 mg in am and 40 mg in afternoon. She had a visit today for pulmonary treatment  at cardiac rehab. She denies weight gain, she actually reports losing about 20 pounds recently.  She was given dose of IV lasix 40 mg in ED.    - Daily weights.    - Strict I&Os.    - continue IV furosemide diuresis  .   Dizziness/near syncope     Patient reports that she was at pulmonary rehab today, during the end of her workout she began feeling ill. She reports she felt dizzy and lightheaded. She proceeded to go to her lymphedema appointment and again was not feeling well. By the time her appointment was coming to an end, she could hardly walk due to her lightheadedness.     Tele stable.    - Telemetry.   - Up with stand by assist.    - Fall precautions.       History of deep venous thrombosis, positive lupus anticoagulant    - Continue prior to admission warfarin, pharmacy to dose.       Hypothyroidism   - Continue prior to admission levothyroxine.       GILES (Obstructive Sleep Apnea)/ Obesity Hypoventilation   - Continue home BIPAP.       Moderate mixed bipolar I disorder   Personality disorder, depressive   - Continue prior to admission trazodone, Lamictal, Abilify, Wellbutrin.     Chronic gout without tophus   - Continue prior to admission allopurinol.     Lymphedema of both lower extremities  She reports her edema has continued to improve. She had a visit today at the lymphedema clinic.    - Continue outpatient lymphedema treatment.     RLS   - Continue prior to admission mirapex.       Type 2 diabetes mellitus   Managed with diet. Not on medications at baseline.    - ISS.   - Moderate CHO diet.       Thrombocytopenia, chronic   Admit platelet count of 110. Baseline appears to be 112-140. Stable.   - Continue to monitor.     Hyperlipidemia   - Continue prior to admission simvastatin.     GERD   - Continue prior to admission omeprazole.     COVID ruled out  Patient was tested in ED, rapid test was NEGATIVE.       Diet: Moderate Consistent CHO DietModerate CHO diet   DVT Prophylaxis: Warfarin  Goldsmith Catheter:  in place, indication: Strict 1-2 Hour I&O  Code Status: Full CodeFull code     Discussion: Improved significantly    Disposition: Anticipate discharge likely tomorrow     Attestation:  Total time: 35 minutes    Aaron Luz MD  [facility] Medicine        Interval History   Feels better. Much less full feeling in chest and upper abdomen. No abdominal pain. No nausea. No shortness of breath at rest.     Physical Exam   Temp:  [98  F (36.7  C)-98.5  F (36.9  C)] 98.5  F (36.9  C)  Pulse:  [65-92] 83  Resp:  [15-27] 26  BP: (117-158)/(45-83) 117/45  SpO2:  [80 %-99 %] 93 %    Weights:   Vitals:    12/31/20 1305 12/31/20 1945 01/01/21 0438   Weight: 101.2 kg (223 lb) 99.9 kg (220 lb 3.8 oz) 98.7 kg (217 lb 9.5 oz)    Body mass index is 45.48 kg/m .    Constitutional: alert and oriented, NAD, off oxygen  CV: Regular, JVP is elevated, 3+ bilateral lower extremity edema into thighs but not tight, 3/6 systolic murmur  Respiratory: bilateral crackles 3/4 up, anterior crackles on right, no wheezes or rhonchi  GI: Soft, non-tender, bowel sounds normal  Skin: Warm and dry. Lower legs wrapped    Data   Recent Labs   Lab 01/01/21  0545 12/31/20  1733 12/31/20  1345 12/31/20  1210   WBC 6.3  --  7.2  --    HGB 10.9*  --  11.7  --    MCV 86  --  85  --    PLT 95*  --  110*  --    INR 3.26*  --   --  3.94*     --  139  --    POTASSIUM 3.7  --  3.4  --    CHLORIDE 104  --  102  --    CO2 36*  --  34*  --    BUN 14  --  11  --    CR 0.72  --  0.69  --    ANIONGAP <1*  --  3  --    HOOD 8.7  --  8.8  --    *  --  124*  --    TROPI  --  0.026 0.021  --        Recent Labs   Lab 01/01/21  1116 01/01/21  0545 12/31/20  2200 12/31/20  1345   GLC  --  130*  --  124*   *  --  178*  --         Unresulted Labs Ordered in the Past 30 Days of this Admission     No orders found for last 31 day(s).           Imaging:   Recent Results (from the past 24 hour(s))   XR Chest Port 1 View    Narrative    CHEST ONE VIEW PORTABLE   12/31/2020 3:10 PM     HISTORY: Cough, post-tussive emesis, lightheaded, occasional chest  pain.    COMPARISON: Chest x-ray on 11/17/2020      Impression    IMPRESSION: Enlarged cardiac silhouette and mild pulmonary vascular  congestion. No significant pleural effusion or pneumothorax.    GILMER BATRES MD        I reviewed all new labs and imaging results over the last 24 hours. I personally reviewed the chest x-ray image(s) showing vascular congestion and pulmonary infiltrates c/w edema on admission.    Medications     - MEDICATION INSTRUCTIONS -       Warfarin Therapy Reminder         allopurinol  300 mg Oral Daily     ARIPiprazole  10 mg Oral Daily     azithromycin  500 mg Intravenous Q24H     buPROPion  150 mg Oral QPM     fluticasone-vilanterol  1 puff Inhalation Daily     furosemide  40 mg Intravenous Q8H     gabapentin  600 mg Oral TID     insulin aspart  1-7 Units Subcutaneous TID AC     insulin aspart  1-5 Units Subcutaneous At Bedtime     ipratropium - albuterol 0.5 mg/2.5 mg/3 mL  3 mL Nebulization Q4H While awake     lamoTRIgine  100 mg Oral QPM     levothyroxine  175 mcg Oral Daily     omeprazole  20 mg Oral BID     pramipexole  0.125-0.25 mg Oral At Bedtime     simvastatin  20 mg Oral At Bedtime     traZODone  150 mg Oral At Bedtime     warfarin ANTICOAGULANT  3.75 mg Oral ONCE at 18:00   Reviewed    Aaron Luz MD  [facility] Medicine

## 2021-01-02 LAB
ANION GAP SERPL CALCULATED.3IONS-SCNC: 1 MMOL/L (ref 3–14)
BUN SERPL-MCNC: 17 MG/DL (ref 7–30)
CALCIUM SERPL-MCNC: 8.6 MG/DL (ref 8.5–10.1)
CHLORIDE SERPL-SCNC: 102 MMOL/L (ref 94–109)
CO2 SERPL-SCNC: 36 MMOL/L (ref 20–32)
CREAT SERPL-MCNC: 0.72 MG/DL (ref 0.52–1.04)
ERYTHROCYTE [DISTWIDTH] IN BLOOD BY AUTOMATED COUNT: 20.3 % (ref 10–15)
GFR SERPL CREATININE-BSD FRML MDRD: >90 ML/MIN/{1.73_M2}
GLUCOSE BLDC GLUCOMTR-MCNC: 138 MG/DL (ref 70–99)
GLUCOSE BLDC GLUCOMTR-MCNC: 170 MG/DL (ref 70–99)
GLUCOSE BLDC GLUCOMTR-MCNC: 178 MG/DL (ref 70–99)
GLUCOSE SERPL-MCNC: 111 MG/DL (ref 70–99)
HCT VFR BLD AUTO: 37.5 % (ref 35–47)
HGB BLD-MCNC: 10.4 G/DL (ref 11.7–15.7)
INR PPP: 1.92 (ref 0.86–1.14)
MCH RBC QN AUTO: 23.9 PG (ref 26.5–33)
MCHC RBC AUTO-ENTMCNC: 27.7 G/DL (ref 31.5–36.5)
MCV RBC AUTO: 86 FL (ref 78–100)
PLATELET # BLD AUTO: 111 10E9/L (ref 150–450)
POTASSIUM SERPL-SCNC: 3.1 MMOL/L (ref 3.4–5.3)
RBC # BLD AUTO: 4.35 10E12/L (ref 3.8–5.2)
SODIUM SERPL-SCNC: 139 MMOL/L (ref 133–144)
WBC # BLD AUTO: 6.3 10E9/L (ref 4–11)

## 2021-01-02 PROCEDURE — 99233 SBSQ HOSP IP/OBS HIGH 50: CPT | Performed by: INTERNAL MEDICINE

## 2021-01-02 PROCEDURE — 250N000013 HC RX MED GY IP 250 OP 250 PS 637: Performed by: INTERNAL MEDICINE

## 2021-01-02 PROCEDURE — 999N001017 HC STATISTIC GLUCOSE BY METER IP

## 2021-01-02 PROCEDURE — 80048 BASIC METABOLIC PNL TOTAL CA: CPT | Performed by: INTERNAL MEDICINE

## 2021-01-02 PROCEDURE — 85027 COMPLETE CBC AUTOMATED: CPT | Performed by: INTERNAL MEDICINE

## 2021-01-02 PROCEDURE — 999N000157 HC STATISTIC RCP TIME EA 10 MIN

## 2021-01-02 PROCEDURE — 36415 COLL VENOUS BLD VENIPUNCTURE: CPT | Performed by: INTERNAL MEDICINE

## 2021-01-02 PROCEDURE — 120N000004 HC R&B MS OVERFLOW

## 2021-01-02 PROCEDURE — 94640 AIRWAY INHALATION TREATMENT: CPT

## 2021-01-02 PROCEDURE — 85610 PROTHROMBIN TIME: CPT | Performed by: INTERNAL MEDICINE

## 2021-01-02 PROCEDURE — 94640 AIRWAY INHALATION TREATMENT: CPT | Mod: 76

## 2021-01-02 PROCEDURE — 250N000011 HC RX IP 250 OP 636: Performed by: INTERNAL MEDICINE

## 2021-01-02 PROCEDURE — 250N000009 HC RX 250: Performed by: INTERNAL MEDICINE

## 2021-01-02 RX ORDER — WARFARIN SODIUM 5 MG/1
10 TABLET ORAL
Status: COMPLETED | OUTPATIENT
Start: 2021-01-02 | End: 2021-01-02

## 2021-01-02 RX ADMIN — SIMVASTATIN 20 MG: 20 TABLET, FILM COATED ORAL at 20:30

## 2021-01-02 RX ADMIN — GABAPENTIN 600 MG: 600 TABLET, FILM COATED ORAL at 08:13

## 2021-01-02 RX ADMIN — FUROSEMIDE 40 MG: 10 INJECTION, SOLUTION INTRAMUSCULAR; INTRAVENOUS at 06:34

## 2021-01-02 RX ADMIN — PRAMIPEXOLE DIHYDROCHLORIDE 0.25 MG: 0.12 TABLET ORAL at 20:30

## 2021-01-02 RX ADMIN — IPRATROPIUM BROMIDE AND ALBUTEROL SULFATE 3 ML: .5; 3 SOLUTION RESPIRATORY (INHALATION) at 12:31

## 2021-01-02 RX ADMIN — OMEPRAZOLE 20 MG: 20 CAPSULE, DELAYED RELEASE ORAL at 08:13

## 2021-01-02 RX ADMIN — LAMOTRIGINE 100 MG: 100 TABLET ORAL at 17:42

## 2021-01-02 RX ADMIN — LEVOTHYROXINE SODIUM 175 MCG: 50 TABLET ORAL at 06:34

## 2021-01-02 RX ADMIN — FLUTICASONE FUROATE AND VILANTEROL TRIFENATATE 1 PUFF: 200; 25 POWDER RESPIRATORY (INHALATION) at 08:13

## 2021-01-02 RX ADMIN — ALLOPURINOL 300 MG: 300 TABLET ORAL at 08:13

## 2021-01-02 RX ADMIN — IPRATROPIUM BROMIDE AND ALBUTEROL SULFATE 3 ML: .5; 3 SOLUTION RESPIRATORY (INHALATION) at 15:52

## 2021-01-02 RX ADMIN — GABAPENTIN 600 MG: 600 TABLET, FILM COATED ORAL at 20:30

## 2021-01-02 RX ADMIN — IPRATROPIUM BROMIDE AND ALBUTEROL SULFATE 3 ML: .5; 3 SOLUTION RESPIRATORY (INHALATION) at 19:46

## 2021-01-02 RX ADMIN — ARIPIPRAZOLE 10 MG: 10 TABLET ORAL at 08:14

## 2021-01-02 RX ADMIN — OMEPRAZOLE 20 MG: 20 CAPSULE, DELAYED RELEASE ORAL at 20:30

## 2021-01-02 RX ADMIN — BUPROPION HYDROCHLORIDE 150 MG: 150 TABLET, FILM COATED, EXTENDED RELEASE ORAL at 17:42

## 2021-01-02 RX ADMIN — INSULIN ASPART 1 UNITS: 100 INJECTION, SOLUTION INTRAVENOUS; SUBCUTANEOUS at 11:29

## 2021-01-02 RX ADMIN — FUROSEMIDE 40 MG: 10 INJECTION, SOLUTION INTRAMUSCULAR; INTRAVENOUS at 13:08

## 2021-01-02 RX ADMIN — GABAPENTIN 600 MG: 600 TABLET, FILM COATED ORAL at 13:08

## 2021-01-02 RX ADMIN — FUROSEMIDE 40 MG: 10 INJECTION, SOLUTION INTRAMUSCULAR; INTRAVENOUS at 20:30

## 2021-01-02 RX ADMIN — ACETAMINOPHEN 325 MG: 325 TABLET, FILM COATED ORAL at 08:13

## 2021-01-02 RX ADMIN — IPRATROPIUM BROMIDE AND ALBUTEROL SULFATE 3 ML: .5; 3 SOLUTION RESPIRATORY (INHALATION) at 07:27

## 2021-01-02 RX ADMIN — TRAZODONE HYDROCHLORIDE 150 MG: 100 TABLET ORAL at 20:30

## 2021-01-02 RX ADMIN — WARFARIN SODIUM 10 MG: 5 TABLET ORAL at 17:42

## 2021-01-02 ASSESSMENT — ACTIVITIES OF DAILY LIVING (ADL)
ADLS_ACUITY_SCORE: 15
ADLS_ACUITY_SCORE: 13
ADLS_ACUITY_SCORE: 13
ADLS_ACUITY_SCORE: 15

## 2021-01-02 ASSESSMENT — MIFFLIN-ST. JEOR: SCORE: 1489.75

## 2021-01-02 NOTE — CONSULTS
Care Management:    The patient has a Care Management automatic consult for current Clinic Care Coordination.    Chart reviewed and plan of care discussed in Interdisciplinary Rounds.  The patient has no discharge needs identified.    Care Management will continue to monitor through daily chart reviews and Interdisciplinary Rounds.  A referral will be placed upon discharge for Clinic Care Coordination.      Rhiannon Landon RN, Care Coordinator [phone]

## 2021-01-02 NOTE — PLAN OF CARE
Major Shift Events: Patient remained in bed and rested between cares. Requested to have bilateral leg compression wraps removed d/t discomfort. Prn tylenol give x1. Complaints of chronic bilateral lower extremity nerve pain. Reports dyspnea laying in bed and on exertion. Oxygenating on room air with adequate oxygen saturations. Clear/Diminished lung sounds.     Plan: Continue to monitor and notify MD of any changes.   For vital signs and complete assessments, please see documentation flowsheets.

## 2021-01-02 NOTE — PLAN OF CARE
Problem: Adult Inpatient Plan of Care  Goal: Plan of Care Review  Outcome: Improving  Goal: Patient-Specific Goal (Individualized)  Outcome: Improving  Goal: Absence of Hospital-Acquired Illness or Injury  Outcome: Improving  Goal: Optimal Comfort and Wellbeing  Outcome: Improving  Goal: Readiness for Transition of Care  Outcome: Improving     Problem: Gas Exchange Impaired  Goal: Optimal Gas Exchange  Outcome: Improving

## 2021-01-03 VITALS
DIASTOLIC BLOOD PRESSURE: 51 MMHG | SYSTOLIC BLOOD PRESSURE: 130 MMHG | HEIGHT: 58 IN | BODY MASS INDEX: 45.95 KG/M2 | RESPIRATION RATE: 18 BRPM | HEART RATE: 69 BPM | TEMPERATURE: 97.9 F | OXYGEN SATURATION: 94 % | WEIGHT: 218.92 LBS

## 2021-01-03 LAB
ANION GAP SERPL CALCULATED.3IONS-SCNC: 2 MMOL/L (ref 3–14)
BUN SERPL-MCNC: 19 MG/DL (ref 7–30)
CALCIUM SERPL-MCNC: 9.4 MG/DL (ref 8.5–10.1)
CHLORIDE SERPL-SCNC: 99 MMOL/L (ref 94–109)
CO2 SERPL-SCNC: 37 MMOL/L (ref 20–32)
CREAT SERPL-MCNC: 0.81 MG/DL (ref 0.52–1.04)
GFR SERPL CREATININE-BSD FRML MDRD: 83 ML/MIN/{1.73_M2}
GLUCOSE BLDC GLUCOMTR-MCNC: 132 MG/DL (ref 70–99)
GLUCOSE BLDC GLUCOMTR-MCNC: 153 MG/DL (ref 70–99)
GLUCOSE SERPL-MCNC: 115 MG/DL (ref 70–99)
INR PPP: 1.51 (ref 0.86–1.14)
POTASSIUM SERPL-SCNC: 3.2 MMOL/L (ref 3.4–5.3)
SODIUM SERPL-SCNC: 138 MMOL/L (ref 133–144)

## 2021-01-03 PROCEDURE — 999N000157 HC STATISTIC RCP TIME EA 10 MIN

## 2021-01-03 PROCEDURE — 94640 AIRWAY INHALATION TREATMENT: CPT | Mod: 76

## 2021-01-03 PROCEDURE — 250N000011 HC RX IP 250 OP 636: Performed by: INTERNAL MEDICINE

## 2021-01-03 PROCEDURE — 99239 HOSP IP/OBS DSCHRG MGMT >30: CPT | Performed by: INTERNAL MEDICINE

## 2021-01-03 PROCEDURE — 36415 COLL VENOUS BLD VENIPUNCTURE: CPT | Performed by: INTERNAL MEDICINE

## 2021-01-03 PROCEDURE — 80048 BASIC METABOLIC PNL TOTAL CA: CPT | Performed by: INTERNAL MEDICINE

## 2021-01-03 PROCEDURE — 250N000009 HC RX 250: Performed by: INTERNAL MEDICINE

## 2021-01-03 PROCEDURE — 85610 PROTHROMBIN TIME: CPT | Performed by: INTERNAL MEDICINE

## 2021-01-03 PROCEDURE — 94640 AIRWAY INHALATION TREATMENT: CPT

## 2021-01-03 PROCEDURE — 250N000013 HC RX MED GY IP 250 OP 250 PS 637: Performed by: INTERNAL MEDICINE

## 2021-01-03 PROCEDURE — 999N001017 HC STATISTIC GLUCOSE BY METER IP

## 2021-01-03 RX ORDER — WARFARIN SODIUM 5 MG/1
TABLET ORAL
Qty: 157 TABLET | Refills: 1 | COMMUNITY
Start: 2021-01-03 | End: 2021-01-03

## 2021-01-03 RX ORDER — POTASSIUM CHLORIDE 750 MG/1
20 TABLET, EXTENDED RELEASE ORAL DAILY
Qty: 90 TABLET | Refills: 2 | Status: SHIPPED | OUTPATIENT
Start: 2021-01-03 | End: 2021-02-21

## 2021-01-03 RX ORDER — WARFARIN SODIUM 5 MG/1
TABLET ORAL
Qty: 157 TABLET | Refills: 1 | COMMUNITY
Start: 2021-01-03 | End: 2021-03-02

## 2021-01-03 RX ORDER — METOLAZONE 5 MG/1
5 TABLET ORAL SEE ADMIN INSTRUCTIONS
Qty: 30 TABLET | Refills: 1 | Status: SHIPPED | OUTPATIENT
Start: 2021-01-03

## 2021-01-03 RX ADMIN — ALLOPURINOL 300 MG: 300 TABLET ORAL at 08:20

## 2021-01-03 RX ADMIN — FUROSEMIDE 40 MG: 10 INJECTION, SOLUTION INTRAMUSCULAR; INTRAVENOUS at 06:47

## 2021-01-03 RX ADMIN — ARIPIPRAZOLE 10 MG: 10 TABLET ORAL at 08:20

## 2021-01-03 RX ADMIN — OMEPRAZOLE 20 MG: 20 CAPSULE, DELAYED RELEASE ORAL at 08:20

## 2021-01-03 RX ADMIN — GABAPENTIN 600 MG: 600 TABLET, FILM COATED ORAL at 08:20

## 2021-01-03 RX ADMIN — FLUTICASONE FUROATE AND VILANTEROL TRIFENATATE 1 PUFF: 200; 25 POWDER RESPIRATORY (INHALATION) at 08:22

## 2021-01-03 RX ADMIN — IPRATROPIUM BROMIDE AND ALBUTEROL SULFATE 3 ML: .5; 3 SOLUTION RESPIRATORY (INHALATION) at 11:19

## 2021-01-03 RX ADMIN — INSULIN ASPART 1 UNITS: 100 INJECTION, SOLUTION INTRAVENOUS; SUBCUTANEOUS at 11:42

## 2021-01-03 RX ADMIN — LEVOTHYROXINE SODIUM 175 MCG: 50 TABLET ORAL at 06:47

## 2021-01-03 RX ADMIN — IPRATROPIUM BROMIDE AND ALBUTEROL SULFATE 3 ML: .5; 3 SOLUTION RESPIRATORY (INHALATION) at 07:40

## 2021-01-03 ASSESSMENT — ACTIVITIES OF DAILY LIVING (ADL)
ADLS_ACUITY_SCORE: 14
ADLS_ACUITY_SCORE: 14
ADLS_ACUITY_SCORE: 12
ADLS_ACUITY_SCORE: 14

## 2021-01-03 ASSESSMENT — MIFFLIN-ST. JEOR: SCORE: 1482.75

## 2021-01-03 NOTE — DISCHARGE SUMMARY
[place] Hospitalist Discharge Summary    Bia Bill MRN# [id number]   Age: 55 year old YOB: 1965     Date of Admission:  12/31/2020  Date of Discharge::  1/3/2021  Admitting Physician:  Sylvester Erickson MD  Discharge Physician:  Kermit Godoy MD  Primary Physician: Kd Leong Facility: N/A     Home clinic: [facility]          Admission Diagnoses:   Pulmonary hypertension (H) [I27.20]  Acute respiratory failure with hypoxia (H) [J96.01]  Acute on chronic right-sided heart failure (H) [I50.813]  Exacerbation of asthma, unspecified asthma severity, unspecified whether persistent [J45.901]          Discharge Diagnosis:     Principle diagnosis: Acute exacerbation of  diastolic heart failure/ R heart failure/ pulm HTN  Secondary diagnoses:  Patient Active Problem List   Diagnosis     Mild persistent asthma     Chronic leg pain     History of deep venous thrombosis     Alcohol abuse, in remission     Secondary polycythemia     Chondromalacia of patella     Developmental reading disorder     Esophageal reflux     Hypothyroidism     Fatty liver     GILES (Obstructive Sleep Apnea)/ Obesity Hypoventilation     RLS (restless legs syndrome)     Tobacco use disorder     Moderate mixed bipolar I disorder (H)     Personality disorder, depressive     Rosacea     [facility]     DDD (degenerative disc disease), cervical     Benign neoplasm of colon (POLYPOSIS)     Somatization disorder     Vitamin D deficiency     Morbid obesity (H)     Cyst of left ovary     Conductive hearing loss of left ear with restricted hearing of right ear     Sensorineural hearing loss (SNHL) of right ear with restricted hearing of left ear     Chronic gout without tophus     Acute respiratory failure with hypoxia and hypercapnia (H)     Lymphedema of both lower extremities     Type 2 diabetes mellitus (H)     Thrombocytopenia (H)     Acute congestive heart failure, unspecified heart failure type (H)      Chronic respiratory failure with hypercapnia (H)     Hyperlipidemia     Elevated troponin     Hypoxia     Acute cor pulmonale (H)     CHF (congestive heart failure) (H)     Acute respiratory failure with hypoxia (H)     Pulmonary hypertension (H)     Acute on chronic right-sided heart failure (H)     Near syncope     Exacerbation of asthma, unspecified asthma severity, unspecified whether persistent          Brief History of Presenting Illness:   As per admit hx  Bia Bill is a 55 year old female with past medical history of COPD, asthma, HF, gout, hypertension, DVT, bipolar disorder, GILES, lymphedema, hyperlipidemia, thrombocytopenia who now presents on 12/31/2020 with dizziness and cough.      Patient was at pulmonary edema clinic and lymphedema clinic today, she was initially feeling well. Patient reports that she was at pulmonary rehab today, during the end of her workout she began feeling ill. She reports she felt dizzy and lightheaded. She proceeded to go to her lymphedema appointment and again was not feeling well. By the time her appointment was coming to an end, she could hardly walk due to her lightheadedness. Around 1030 this morning she developed a cough, she had a coughing fit that ultimately ended in her vomiting, she has had 2 episodes of emesis. She admits to feeling heart palpitations. She feels short of breath. She feels extremely tired. Reports has not been sleeping well.      She denies chills or fever. No sore throat. No nasal congestion. No chest pain. No abdominal pain or nausea. No change in bowel habits. No urinary complaints. No loss of taste or smell. No known sick contacts. She denies increase in her weight. Has chronic lower ext edema that has actually improved from prior. Reports has been taking her lasix as prescribed. Admits to having change in her vision recently - due to her DM and has recently gotten a new glasses prescription.          Hospital Course:   Acute respiratory  failure with hypoxia and hypercapnia   Likely due to obesity hypoventilation syndrome/ diastolic CHF. Self admitted herself for SOB while going to labs/ clinics for care. VBG showed  7.26, PCO2 80, bicarb 36. Patient was placed on BIPAP. Improved with diuresis. Off BIPAP in daytime-uses as at home at night. Just started using home BIPAP since last admission 11/20  Resolved      Mild persistent asthma/COPD   Stable. Does not seem to be in exacerbation..  Continue home INH      Acute exacerbation of  diastolic heart failure/ R heart failure/ pulm HTN  Last ECHO 11/20 showed moderately reduced R hrt fx and mod pulm HTN.  Patient managed prior to admission with lasix 60 mg in am and 40 mg in afternoon. CXR showed vascular congestion. CT 11/20 had shown hepatic congestion/ mild ascitis. Started on lasix 40mg iv q8 hrs. Wt  3 kg down since admit , cr stable. pt feels lot better and wants to go home  Will give a script for metolazone to use bid prn SOB, weight gain, edema.    .   Dizziness/near syncope   On admission-resolved.        History of deep venous thrombosis, positive lupus anticoagulant    - Continue prior to admission warfarin       Hypothyroidism   - Continue prior to admission levothyroxine.       GILES (Obstructive Sleep Apnea)/ Obesity Hypoventilation   - Continue home BIPAP.       Moderate mixed bipolar I disorder   Personality disorder, depressive   - Continue prior to admission trazodone, Lamictal, Abilify, Wellbutrin.      Chronic gout without tophus   - Continue prior to admission allopurinol.      Lymphedema of both lower extremities  She reports her edema has continued to improve. She had a visit today at the lymphedema clinic.    - Continue outpatient lymphedema treatment.      RLS   - Continue prior to admission mirapex.       Type 2 diabetes mellitus   Managed with diet. Not on medications at baseline.       Thrombocytopenia, chronic   Admit platelet count of 110. Baseline appears to be 112-140.  Stable.     Hyperlipidemia   - Continue prior to admission simvastatin.      GERD   - Continue prior to admission omeprazole.      COVID ruled out  Patient was tested in ED, rapid test was NEGATIVE.             Procedures:   No procedures performed during this admission         Allergies:      Allergies   Allergen Reactions     Darvocet [Propoxyphene N-Apap] Anaphylaxis     Darvocet,Percocet      Percocet [Oxycodone-Acetaminophen] Anaphylaxis, Hives and Swelling     Has tolerated hydromorphone in the past.      Asa [Aspirin] Hives     Aspirin causes seizures and hives, throat swelling.   Has tolerated ketorolac in the past.        Bee      Ibuprofen Swelling     Throat swelling per patient      Lyrica [Pregabalin] Dizziness and Swelling     Povidone Iodine Rash     Reaction to topical betadine     Tape [Adhesive Tape] Rash             Medications Prior to Admission:     Medications Prior to Admission   Medication Sig Dispense Refill Last Dose     ADVAIR -21 MCG/ACT inhaler INHALE TWO PUFFS BY MOUTH TWICE A DAY (Patient taking differently: Inhale 2 puffs into the lungs 2 times daily *Do not use more frequently than twice daily.*  Rinse mouth after use.) 12 g 3 12/31/2020 at Unknown time     albuterol (PROAIR HFA/PROVENTIL HFA/VENTOLIN HFA) 108 (90 Base) MCG/ACT inhaler INHALE TWO PUFFS BY MOUTH EVERY 6 HOURS AS NEEDED FOR SHORTNESS OF BREATH DIFFICULTY BREATHING OR WHEEZING (Patient taking differently: Inhale 2 puffs into the lungs every 6 hours as needed for shortness of breath / dyspnea or wheezing ) 18 g 0 12/31/2020 at Unknown time     allopurinol (ZYLOPRIM) 300 MG tablet TAKE ONE TABLET BY MOUTH ONCE DAILY (Patient taking differently: Take 300 mg by mouth daily ) 90 tablet 1 12/31/2020 at Unknown time     ARIPiprazole (ABILIFY) 10 MG tablet TAKE ONE TABLET BY MOUTH ONCE DAILY (Patient taking differently: Take 10 mg by mouth daily ) 90 tablet 1 12/31/2020 at Unknown time     buPROPion (WELLBUTRIN XL) 150  MG 24 hr tablet TAKE ONE TABLET BY MOUTH EVERY EVENING 90 tablet 0 12/30/2020 at Unknown time     furosemide (LASIX) 40 MG tablet Take 60 mg every morning and 40 mg every afternoon, reassess dosage at follow-up with primary care provider 180 tablet 4 12/31/2020 at Unknown time     gabapentin (NEURONTIN) 300 MG capsule Take 1 capsule (300 mg) by mouth At Bedtime TAKE ONE CAPSULE BY MOUTH ONCE DAILY AT BEDTIME WITH A 600 MG TABLET FOR A TOTAL DOSE  MG 90 capsule 3 12/30/2020 at Unknown time     gabapentin (NEURONTIN) 600 MG tablet TAKE ONE TABLET BY MOUTH THREE TIMES A DAY (Patient taking differently: Take 600 mg by mouth 3 times daily TAKE ONE TABLET BY MOUTH THREE TIMES A DAY) 90 tablet 3 12/30/2020 at Unknown time     lamoTRIgine (LAMICTAL) 100 MG tablet TAKE ONE TABLET BY MOUTH ONCE DAILY (Patient taking differently: Take 100 mg by mouth every evening ) 90 tablet 1 12/31/2020 at Unknown time     levothyroxine (SYNTHROID/LEVOTHROID) 175 MCG tablet TAKE ONE TABLET BY MOUTH ONCE DAILY 90 tablet 0 12/31/2020 at Unknown time     mometasone-formoterol (DULERA) 200-5 MCG/ACT oral inhaler Inhale 2 puffs into the lungs 2 times daily 13 g 1 12/30/2020 at Unknown time     omeprazole (PRILOSEC) 20 MG DR capsule Take 1 capsule (20 mg) by mouth 2 times daily 60 capsule 11 12/30/2020 at Unknown time     pramipexole (MIRAPEX) 0.125 MG tablet Take 1-20 tablets (0.125-2.5 mg) by mouth At Bedtime TAKE 1-2 TABLETS BY MOUTH AT BEDTIME (Patient taking differently: Take 0.125-0.25 mg by mouth At Bedtime TAKE 1-2 TABLETS BY MOUTH AT BEDTIME) 180 tablet 1 12/30/2020 at Unknown time     simvastatin (ZOCOR) 20 MG tablet TAKE ONE TABLET BY MOUTH EVERY NIGHT AT BEDTIME (Patient taking differently: Take 20 mg by mouth At Bedtime ) 90 tablet 2 12/30/2020 at Unknown time     traZODone (DESYREL) 150 MG tablet TAKE ONE TABLET BY MOUTH EVERY NIGHT AT BEDTIME (Patient taking differently: Take 150 mg by mouth At Bedtime ) 90 tablet 1 12/30/2020  at Unknown time     VITAMIN D3 25 MCG (1000 UT) tablet TAKE ONE TABLET BY MOUTH ONCE DAILY (Patient taking differently: Take 1 tablet by mouth every evening ) 100 tablet 2 12/31/2020 at Unknown time     warfarin ANTICOAGULANT (JANTOVEN ANTICOAGULANT) 5 MG tablet TAKE TWO AND ONE-HALF TABLETS BY MOUTH EVERY MONDAY, TWO TABLETS ON ALL OTHER DAYS OR AS DIRECTED BY ANTICOAGULATION CLINIC (Patient taking differently: Take by mouth every evening TAKE TWO AND ONE-HALF TABLETS BY MOUTH EVERY MONDAY, TWO TABLETS ON ALL OTHER DAYS OR AS DIRECTED BY ANTICOAGULATION CLINIC) 157 tablet 1 12/30/2020 at Unknown time     acetaminophen (TYLENOL) 500 MG tablet Take 500-1,000 mg by mouth every 6 hours as needed for mild pain   Unknown at Unknown time     albuterol (PROVENTIL) (2.5 MG/3ML) 0.083% neb solution Take 1 vial (2.5 mg) by nebulization every 6 hours as needed for shortness of breath / dyspnea or wheezing 1 Box 0 Unknown at Unknown time     alcohol swab prep pads Use to swab area of injection/mike as directed. 100 each 3 Unknown at Unknown time     blood glucose (NO BRAND SPECIFIED) test strip Use to test blood sugar 2-3x/day. To accompany: Blood Glucose Monitor Brands: per insurance. 100 strip 11 Unknown at Unknown time     blood glucose calibration (NO BRAND SPECIFIED) solution To accompany: Blood Glucose Monitor Brands: per insurance. 1 Bottle 3 Unknown at Unknown time     blood glucose monitoring (NO BRAND SPECIFIED) meter device kit Use to test blood sugar daily or as directed. Preferred blood glucose meter OR supplies to accompany: Blood Glucose Monitor Brands: per insurance. 1 kit 0 Unknown at Unknown time     EPINEPHrine (ANY BX GENERIC EQUIV) 0.3 MG/0.3ML injection 2-pack Inject 0.3 mLs (0.3 mg) into the muscle once as needed for anaphylaxis 0.6 mL 0 Unknown at Unknown time     ipratropium - albuterol 0.5 mg/2.5 mg/3 mL (DUONEB) 0.5-2.5 (3) MG/3ML neb solution INHALE 1 VIAL VIA NEBULIZER EVERY 6 HOURS AS NEEDED FOR  FOR SHORTNESS OF BREATH / DYSPNEA OR WHEEZING 90 vial 0 Unknown at Unknown time     nitroGLYcerin (NITROSTAT) 0.4 MG sublingual tablet For chest pain place 1 tablet under the tongue every 5 minutes for 3 doses. If symptoms persist 5 minutes after 1st dose call 911. 15 tablet 0 Unknown at Unknown time     order for DME Equipment being ordered:x2 [employer] 30/40mmHg compression wraps with x2 extra prs of compression liners 1 each 0 Unknown at Unknown time     order for DME Equipment being ordered: Nebulizer 1 Device 0 Unknown at Unknown time     order for DME Equipment being ordered: CPAP  AIRSENSE 10  5-18 CM H20  SN# [id number]   DN# [id number]   Unknown at Unknown time     order for DME Equipment being ordered: DEPENDS SIZE LARGE 60 Month 5 Unknown at Unknown time     order for DME Equipment being ordered: INCONTINENCE PADS   QID 1 Month 11      thin (NO BRAND SPECIFIED) lancets Use with lanceting device. To accompany: Blood Glucose Monitor Brands: per insurance. 1 each 6 Unknown at Unknown time     [DISCONTINUED] potassium chloride ER (KLOR-CON M) 10 MEQ CR tablet TAKE ONE TABLET BY MOUTH ONCE DAILY (Patient taking differently: Take 10 mEq by mouth every evening ) 90 tablet 2 12/31/2020 at Unknown time             Discharge Medications:     Current Discharge Medication List      START taking these medications    Details   metolazone (ZAROXOLYN) 5 MG tablet Take 1 tablet (5 mg) by mouth See Admin Instructions  Qty: 30 tablet, Refills: 1    Comments: Take 2 x/ week as needed for increased SOB, edema.  Associated Diagnoses: Diastolic congestive heart failure, unspecified HF chronicity (H)         CONTINUE these medications which have CHANGED    Details   potassium chloride ER (KLOR-CON M) 10 MEQ CR tablet Take 2 tablets (20 mEq) by mouth daily  Qty: 90 tablet, Refills: 2    Associated Diagnoses: Lymphedema         CONTINUE these medications which have NOT CHANGED    Details   ADVAIR -21 MCG/ACT inhaler INHALE TWO  PUFFS BY MOUTH TWICE A DAY  Qty: 12 g, Refills: 3    Associated Diagnoses: Chronic obstructive pulmonary disease, unspecified COPD type (H)      albuterol (PROAIR HFA/PROVENTIL HFA/VENTOLIN HFA) 108 (90 Base) MCG/ACT inhaler INHALE TWO PUFFS BY MOUTH EVERY 6 HOURS AS NEEDED FOR SHORTNESS OF BREATH DIFFICULTY BREATHING OR WHEEZING  Qty: 18 g, Refills: 0    Comments: OK to fill today Needs follow up virtual visit before further refills  Associated Diagnoses: Mild persistent asthma without complication; Chronic obstructive pulmonary disease, unspecified COPD type (H)      allopurinol (ZYLOPRIM) 300 MG tablet TAKE ONE TABLET BY MOUTH ONCE DAILY  Qty: 90 tablet, Refills: 1    Associated Diagnoses: Acute gouty arthritis      ARIPiprazole (ABILIFY) 10 MG tablet TAKE ONE TABLET BY MOUTH ONCE DAILY  Qty: 90 tablet, Refills: 1    Associated Diagnoses: Major depressive disorder, recurrent episode, moderate (H)      buPROPion (WELLBUTRIN XL) 150 MG 24 hr tablet TAKE ONE TABLET BY MOUTH EVERY EVENING  Qty: 90 tablet, Refills: 0    Comments: OK to fill today Needs Provider follow up appointment before further refills  Associated Diagnoses: Moderate mixed bipolar I disorder (H)      furosemide (LASIX) 40 MG tablet Take 60 mg every morning and 40 mg every afternoon, reassess dosage at follow-up with primary care provider  Qty: 180 tablet, Refills: 4    Associated Diagnoses: Lymphedema of both lower extremities      gabapentin (NEURONTIN) 300 MG capsule Take 1 capsule (300 mg) by mouth At Bedtime TAKE ONE CAPSULE BY MOUTH ONCE DAILY AT BEDTIME WITH A 600 MG TABLET FOR A TOTAL DOSE  MG  Qty: 90 capsule, Refills: 3    Associated Diagnoses: Polyneuropathy in other diseases classified elsewhere (H)      gabapentin (NEURONTIN) 600 MG tablet TAKE ONE TABLET BY MOUTH THREE TIMES A DAY  Qty: 90 tablet, Refills: 3    Associated Diagnoses: Polyneuropathy in other diseases classified elsewhere (H)      lamoTRIgine (LAMICTAL) 100 MG  tablet TAKE ONE TABLET BY MOUTH ONCE DAILY  Qty: 90 tablet, Refills: 1    Associated Diagnoses: Major depressive disorder, recurrent episode, moderate (H)      levothyroxine (SYNTHROID/LEVOTHROID) 175 MCG tablet TAKE ONE TABLET BY MOUTH ONCE DAILY  Qty: 90 tablet, Refills: 0    Associated Diagnoses: Hypothyroidism, unspecified type      mometasone-formoterol (DULERA) 200-5 MCG/ACT oral inhaler Inhale 2 puffs into the lungs 2 times daily  Qty: 13 g, Refills: 1    Associated Diagnoses: COPD exacerbation (H); Chronic obstructive pulmonary disease, unspecified COPD type (H)      omeprazole (PRILOSEC) 20 MG DR capsule Take 1 capsule (20 mg) by mouth 2 times daily  Qty: 60 capsule, Refills: 11    Associated Diagnoses: Gastroesophageal reflux disease without esophagitis      pramipexole (MIRAPEX) 0.125 MG tablet Take 1-20 tablets (0.125-2.5 mg) by mouth At Bedtime TAKE 1-2 TABLETS BY MOUTH AT BEDTIME  Qty: 180 tablet, Refills: 1    Associated Diagnoses: Restless leg syndrome      simvastatin (ZOCOR) 20 MG tablet TAKE ONE TABLET BY MOUTH EVERY NIGHT AT BEDTIME  Qty: 90 tablet, Refills: 2    Associated Diagnoses: Hypercholesteremia      traZODone (DESYREL) 150 MG tablet TAKE ONE TABLET BY MOUTH EVERY NIGHT AT BEDTIME  Qty: 90 tablet, Refills: 1    Associated Diagnoses: Major depressive disorder, recurrent episode, moderate (H)      VITAMIN D3 25 MCG (1000 UT) tablet TAKE ONE TABLET BY MOUTH ONCE DAILY  Qty: 100 tablet, Refills: 2    Associated Diagnoses: Lymphedema of both lower extremities      warfarin ANTICOAGULANT (JANTOVEN ANTICOAGULANT) 5 MG tablet TAKE TWO AND ONE-HALF TABLETS BY MOUTH EVERY MONDAY, TWO TABLETS ON ALL OTHER DAYS OR AS DIRECTED BY ANTICOAGULATION CLINIC  Qty: 157 tablet, Refills: 1    Associated Diagnoses: History of recurrent deep vein thrombosis (DVT)      acetaminophen (TYLENOL) 500 MG tablet Take 500-1,000 mg by mouth every 6 hours as needed for mild pain      albuterol (PROVENTIL) (2.5 MG/3ML)  0.083% neb solution Take 1 vial (2.5 mg) by nebulization every 6 hours as needed for shortness of breath / dyspnea or wheezing  Qty: 1 Box, Refills: 0    Comments: The patient requests that this prescription be held on file for filling in the near future.  Associated Diagnoses: Mild persistent asthma without complication; Chronic obstructive pulmonary disease, unspecified COPD type (H)      alcohol swab prep pads Use to swab area of injection/mike as directed.  Qty: 100 each, Refills: 3    Associated Diagnoses: Type 2 diabetes mellitus without complication, without long-term current use of insulin (H)      blood glucose (NO BRAND SPECIFIED) test strip Use to test blood sugar 2-3x/day. To accompany: Blood Glucose Monitor Brands: per insurance.  Qty: 100 strip, Refills: 11    Associated Diagnoses: Type 2 diabetes mellitus without complication, without long-term current use of insulin (H)      blood glucose calibration (NO BRAND SPECIFIED) solution To accompany: Blood Glucose Monitor Brands: per insurance.  Qty: 1 Bottle, Refills: 3    Associated Diagnoses: Type 2 diabetes mellitus without complication, without long-term current use of insulin (H)      blood glucose monitoring (NO BRAND SPECIFIED) meter device kit Use to test blood sugar daily or as directed. Preferred blood glucose meter OR supplies to accompany: Blood Glucose Monitor Brands: per insurance.  Qty: 1 kit, Refills: 0    Associated Diagnoses: Type 2 diabetes mellitus without complication, without long-term current use of insulin (H)      EPINEPHrine (ANY BX GENERIC EQUIV) 0.3 MG/0.3ML injection 2-pack Inject 0.3 mLs (0.3 mg) into the muscle once as needed for anaphylaxis  Qty: 0.6 mL, Refills: 0    Associated Diagnoses: Anaphylactic reaction to bee sting, undetermined intent, subsequent encounter      ipratropium - albuterol 0.5 mg/2.5 mg/3 mL (DUONEB) 0.5-2.5 (3) MG/3ML neb solution INHALE 1 VIAL VIA NEBULIZER EVERY 6 HOURS AS NEEDED FOR FOR SHORTNESS  "OF BREATH / DYSPNEA OR WHEEZING  Qty: 90 vial, Refills: 0    Comments: OK to fill today Needs follow up appointment before further refills  Associated Diagnoses: Chronic obstructive pulmonary disease, unspecified COPD type (H)      nitroGLYcerin (NITROSTAT) 0.4 MG sublingual tablet For chest pain place 1 tablet under the tongue every 5 minutes for 3 doses. If symptoms persist 5 minutes after 1st dose call 911.  Qty: 15 tablet, Refills: 0    Associated Diagnoses: Chest pain, unspecified type      !! order for DME Equipment being ordered:x2 [employer] 30/40mmHg compression wraps with x2 extra prs of compression liners  Qty: 1 each, Refills: 0    Associated Diagnoses: Secondary lymphedema      !! order for DME Equipment being ordered: Nebulizer  Qty: 1 Device, Refills: 0    Associated Diagnoses: COPD exacerbation (H); Chronic obstructive pulmonary disease, unspecified COPD type (H)      !! order for DME Equipment being ordered: CPAP  AIRSENSE 10  5-18 CM H20  SN# [id number]   DN# [id number]      !! order for DME Equipment being ordered: DEPENDS SIZE LARGE  Qty: 60 Month, Refills: 5    Associated Diagnoses: Mixed incontinence      !! order for DME Equipment being ordered: INCONTINENCE PADS   QID  Qty: 1 Month, Refills: 11    Associated Diagnoses: Other urinary incontinence      thin (NO BRAND SPECIFIED) lancets Use with lanceting device. To accompany: Blood Glucose Monitor Brands: per insurance.  Qty: 1 each, Refills: 6    Associated Diagnoses: Type 2 diabetes mellitus without complication, without long-term current use of insulin (H)       !! - Potential duplicate medications found. Please discuss with provider.                Consultations:   No consultations were requested during this admission            Discharge Exam:   Blood pressure 130/51, pulse 69, temperature 97.9  F (36.6  C), temperature source Oral, resp. rate 18, height 1.473 m (4' 10\"), weight 99.3 kg (218 lb 14.7 oz), last menstrual period 09/27/2009, SpO2 94 %, " not currently breastfeeding.  GENERAL APPEARANCE: healthy, alert and no distress  EYES: conjunctiva clear, eyes grossly normal  HENT: external ears and nose normal   NECK: supple, no masses or adenopathy  RESP: lungs clear to auscultation - no rales, rhonchi or wheezes  CV: regular rate and rhythm, normal S1 S2, no S3 or S4 and no murmur, click or rub   ABDOMEN: soft, nontender, no HSM or masses and bowel sounds normal  MS: no clubbing, cyanosis; no edema  SKIN: clear without significant rashes or lesions  NEURO: Normal strength and tone, sensory exam grossly normal, mentation intact and speech normal    Unresulted Labs Ordered in the Past 30 Days of this Admission     No orders found from 12/1/2020 to 1/1/2021.          No results found for this or any previous visit (from the past 24 hour(s)).         Pending Tests at Discharge:   None         Discharge Instructions and Follow-Up:     Discharge diet: Regular   Discharge activity: Activity as tolerated   Discharge follow-up: Follow up with primary care provider in 1-2 weeks           Discharge Disposition:     Discharged to home      Attestation:  I have reviewed today's vital signs, notes, medications, labs and imaging.    Time Spent on this Encounter   I, Kermit Godoy MD, personally saw the patient today and spent greater than 30 minutes discharging this patient.    Kermit Godoy MD

## 2021-01-03 NOTE — PHARMACY-ANTICOAGULATION SERVICE
Clinical Pharmacy- Warfarin Discharge Note  This patient is currently on warfarin for the treatment of Lupus inhibitor.  INR Goal= 2-3  Expected length of therapy lifetime.    Warfarin PTA Regimen: 7.5 mg Tuesday and Friday, 10 mg all other days of the week.      Anticoagulation Dose History     Recent Dosing and Labs Latest Ref Rng & Units 12/11/2020 12/17/2020 12/22/2020 12/31/2020 1/1/2021 1/2/2021 1/3/2021    Warfarin 3.75 mg - - - - - 3.75 mg - -    Warfarin 5 mg - - - - - - 10 mg -    INR 0.86 - 1.14 3.70(H) 4.16(H) 3.33(H) 3.94(H) 3.26(H) 1.92(H) 1.51(H)    INR Point of Care 0.86 - 1.14 - - - - - - -          Vitamin K doses administered during the last 7 days: none  FFP administered during the last 7 days: none  Recommend discharging the patient on a warfarin regimen of 15 mg today, then resume previous home dose of 7.5 mg Tuesdays and Fridays, 10 mg all other days of the week.  The patient should have an INR checked Thursday (1/7) with furer dosing instructions by the anticoagulation clinic.    Sharmila Giang Pharm.D.  .

## 2021-01-03 NOTE — PLAN OF CARE
Goldsmith catheter removed at 2330, pt did not urinate until 0500 when she urinated 375. Was bladder scanned for 31mL after urinating. No reports of pain, VSS, slept w/home cpap. Using call light appropriately.   Janice Baez RN on 1/3/2021 at 6:10 AM

## 2021-01-03 NOTE — PROGRESS NOTES
Care Management:    The patient will discharge home today.    Referral placed to resume Clinic Care Coordination.      Rhiannon Landon RN, Care Coordinator [phone]

## 2021-01-03 NOTE — PLAN OF CARE
"[facility] DISCHARGE NOTE    Patient discharged to home at 1302 PM via wheel chair. Accompanied by other:friend and staff. Discharge instructions reviewed with patient, opportunity offered to ask questions. Prescriptions sent to patients preferred pharmacy. All belongings sent with patient.    /51 (BP Location: Right arm)   Pulse 69   Temp 97.9  F (36.6  C) (Oral)   Resp 18   Ht 1.473 m (4' 10\")   Wt 99.3 kg (218 lb 14.7 oz)   LMP 09/27/2009   SpO2 94%   BMI 45.75 kg/m      Tana Shine RN  "

## 2021-01-03 NOTE — DISCHARGE INSTRUCTIONS
Warfarin dosing instructions: 15 mg (3 tablets) today, then resume previous warfarin regimen of 7.5 mg (1.5 tablets) every Tuesday and Friday and 10 mg (2 tablets) all other days of the week.    Please go to the lab and have your INR drawn Thursday, 1/7, for monitoring of anticoagulation therapy.  [facility] will call you with further dosing and monitoring instructions.

## 2021-01-04 ENCOUNTER — DOCUMENTATION ONLY (OUTPATIENT)
Dept: FAMILY MEDICINE | Facility: CLINIC | Age: 56
End: 2021-01-04

## 2021-01-04 ENCOUNTER — PATIENT OUTREACH (OUTPATIENT)
Dept: NURSING | Facility: CLINIC | Age: 56
End: 2021-01-04
Payer: COMMERCIAL

## 2021-01-04 DIAGNOSIS — Z86.718 HISTORY OF DEEP VENOUS THROMBOSIS: ICD-10-CM

## 2021-01-04 DIAGNOSIS — D69.6 THROMBOCYTOPENIA (H): ICD-10-CM

## 2021-01-04 NOTE — PROGRESS NOTES
"Clinic Care Coordination Contact    Clinic Care Coordination Contact  OUTREACH    Referral Information: IP report    Primary Diagnosis: Other (include Comment box)(Cellulitis)    Chief Complaint   Patient presents with     Clinic Care Coordination - [facility]     RN      [place] Utilization: Cardiology, pulmonology, lymphedema clinic, psychology  Clinic Utilization  Difficulty keeping appointments:: No  Compliance Concerns: No  No-Show Concerns: No  No PCP office visit in Past Year: No  Utilization    Last refreshed: 1/4/2021 12:32 PM: Hospital Admissions 4           Last refreshed: 1/4/2021 12:32 PM: ED Visits 5           Last refreshed: 1/4/2021 12:32 PM: No Show Count (past year) 9              Current as of: 1/4/2021 12:32 PM          Clinical Concerns:    Current Medical Concerns:  Patient was inpatient at [facility] from 12/31/20 to 1/3/2021 with CHF exacerbation    Patient states she is feeling better. She states \" they don't know why they can't get the fluid off me.\"  We discussed at a high level, why she continues to have fluid build up. Stressed the importance of diet control, medication compliance and daily weights.  Patient has not weighed today.     Patient states she has the [employer] scale that [facility]. Advised this is a MUST for her to do daily when getting up    Patient states she has appointments every day this week with lymphedema and Pulmonary  Rehab. She states she can't get transportation through [employer] every day. We discussed she can get transportation through [employer] as well. Advised she set up times with both companies.  Patient has  Significant other with a car but she states it is not in good shape and she does not want to ride in it.     Patient states she is using her Bipap at night. Denies shortness of breath currently. She sounds better than most phone conversations.       Current Behavioral Concerns: She sees psychology on a regular basis      Education Provided to " patient: As above     Pain  Pain (GOAL):: Yes  Type: Chronic (>3mo)  Location of chronic pain:: Legs   Radiating: No  Progression: Unchanged  Description of pain: Aching, Burning  Chronic pain severity:: 6  Limitation of routine activities due to chronic pain:: Yes  Description: Able to do light housework  Alleviating Factors: Rest, Pain Medication, Heat  Aggravating Factors: Activity    Health Maintenance Reviewed:   Health Maintenance Due   Topic Date Due     HF ACTION PLAN  1965     DIABETIC FOOT EXAM  1965     HEPATITIS C SCREENING  12/03/1984     HEPATITIS B IMMUNIZATION (1 of 3 - Risk 3-dose series) 12/03/1985     ZOSTER IMMUNIZATION (1 of 2) 12/03/2016     MAMMO SCREENING  03/24/2019     PREVENTIVE CARE VISIT  01/30/2021     Clinical Pathway: Clinic Care Coordination CHF Assessment    Discharge:      Day of hospital discharge: 1/3/2021  What recommendations were made for follow up after your recent hospitalization? Continue pulmonary rehab, continue lymphedema clinic visits  Have the follow up appointments been scheduled? Yes    Transportation concerns (GOAL):: Yes - patient will call and schedule rides.     CHF:    Home scale available:  Yes  Home scale weight this morning: Did not weigh  Hospital discharge weight: 220 7.4    Current weight:  Heart Failure Zones sheet on refrigerator or available: she has to find it  Any increased SOB since hospital discharge:  No  Any increased edema since hospital discharge:  No  What number to call for YELLOW zones: [phone]    Symptom Review:   Heart Failure Symptoms  Shortness of breath:: No  Wheezing or noisy breathing?: No  Cough: No  Is your cough:: Loose  Increased sputum: No  Sputum Color: Clear  Fever: No  Chest pain: : No  Heartbeat: Regular  Dizzy or Lightheaded: No  Checking weight daily? : No  Does the patient have understanding of Diuretic self-management?: Yes  Diet:: 1500 mg of sodium  Appetite:: Normal  Bloating:: None  Urination::  "Normal  Fatigue: Yes  Weakness (Heaviness in limbs):: No  Emotional:: Worry  What Heart Failure zone are you currently in?: Green  Overall your CHF symptoms are (GOAL):: Stable  How confident are you with the plan we have identified?: 7    Medications:  \"How many new medications are you on since your hospitalization?\"  0 - 1 Metolazone  \"How many of your current medicines changed (dose, timing, name, etc.) while you were in the hospital?\"  0 - 1  \"Do you have questions about your medications?\"  No      When is the first appointment with the CHF clinic: 1/5/2021      Medication Management:  Medication reconciliation status: Medications reviewed and reconciled.  Continue medications without change.     Functional Status:   Independnet    Living Situation:  Lives with significant other     Lifestyle & Psychosocial Needs:     Social Needs     Financial resource strain: Not very hard     Food insecurity     Worry: Never true     Inability: Never true     Transportation needs     Medical: No     Non-medical: No     Diet:: Diabetic diet  Inadequate nutrition (GOAL):: No  Tube Feeding: No  Inadequate activity/exercise (GOAL):: No  Significant changes in sleep pattern (GOAL): No  Transportation means:: Medical transport([employer] transport)  Financial/Insurance concerns (GOAL):: No      Socioeconomic History     Marital status:      Spouse name: Not on file     Number of children: Not on file     Years of education: Not on file     Highest education level: Not on file   Occupational History     Occupation: Homemaker     Tobacco Use     Smoking status: Current Every Day Smoker     Packs/day: 0.50     Years: 43.00     Pack years: 21.50     Types: Cigarettes     Smokeless tobacco: Never Used     Tobacco comment: started at age 11.  Quit during pregnancyX4.  Quit 3 months ago.    Substance and Sexual Activity     Alcohol use: No     Alcohol/week: 0.0 standard drinks     Comment: quit 1995     Drug use: No     Comment: " past hx 22 years ago/ uppers downers, canibus     Sexual activity: Not Currently     Birth control/protection: Surgical   Resources and Interventions:  Current Resources: None     Goals:   Goals        General    #1 (pt-stated)     Notes - Note edited  10/2/2020  3:18 PM by Michela Whitaker RN    Goal Statement: I want my shortness of breath to improve    Date goal set: 11/1/19    Measure of Success: When I am not short of breath    Barriers: COPD    Strengths: Motivated    Date to Achieve By: March 2021    Patient expressed understanding of goal: Yes    Action steps to achieve this goal    1. I will use nebulizer as instructed  2. I will see PCP  as scheduled  3. I will use my asthma action plan       #3 Improve chronic symptoms (pt-stated)     Notes - Note edited  1/4/2021  3:45 PM by Michela Whitaker RN    Goal Statement: I want my edema to improve.  Date goal set: 12/5/2019   Measure of Success: I will be able to put support stockings on by myself.  Barriers: lymphedema  Strengths: care coordination, lymphedema clinic  Date to Achieve By: March 2021  Patient expressed understanding of goal: yes    Action steps to achieve this goal  1. I will elevate my legs throughout the day  2. I will attend lymphedema clinic appointments  3. I will take my diuretics as prescribed       #4 Monitoring (pt-stated)     Notes - Note edited  1/4/2021  3:45 PM by Michela Whitaker RN    Goal Statement: I will weigh myself every day.  Date goal set: 12/5/2019   Measure of Success: Consistent record of daily weights.  Barriers: Does not like scale  Strengths: care coordination support  Date to Achieve By: March 2021  Patient expressed understanding of goal: yes    Action steps to achieve this goal  1. I will weigh myself every morning.  2. I will write my weight down in a notebook.  3. I will take an extra 20-40 mg of Lasix if my weight increases by 3 lbs.             Patient/Caregiver understanding: States understanding but does not always  comprehend    Outreach Frequency: monthly  Future Appointments              Tomorrow 1, [facility] [place], [place] [facility]    In 2 days [facility]oya [facility] [place] [place] [facility]    In 3 days 1, [facility] [place], [facility]    In 4 days Cassia Montoya [facility] [place] [place] [facility]    In 4 days [facility] [place] [facility]    In 4 days ROOM 6 [facility] [place] [facility]    In 1 week 1, [facility] [place], [facility] [facility]    In 1 week Alisha Valderrama MD [facility], [place] [facility]    In 1 week 1, [facility]    In 2 weeks 1, [facility] [place], [facility]    In 2 weeks Kd Morris [facility], [facility]    In 2 weeks 1, [facility] [place], [facility]    In 3 weeks 1, [facility] [place], [facility]    In 3 weeks 1, [facility]    In 4 weeks 1, [facility] [place], [facility]    In 4 weeks Kd Morris [facility], [facility]    In 1 month 1, [facility] [place], [facility]    In 1 month [facility], [facility] [facility]    In 1 month ROOM 5 [facility], [facility] [facility]    In 1 month 1, [facility]    In 1 month 1, [facility]    In 1 month 1, [facility] [place], [place] [facility]    In 1 month 1, [facility] [place], [place] [facility]    In 1 month 1, [facility]    In 1 month 1, [facility]    In 1 month 1, [facility]    In 1 month 1, [facility]    In 2 months 1, [facility]        Plan: Patient will attend appointments as scheduled or make other arrangements to schedule several on the same day.    Clinic care coordinator will continue to follow.    Michela Whitaker RN, [facility] RN Coordinator  [facility]   1/4/2021    4:04 PM  [phone]

## 2021-01-04 NOTE — PROGRESS NOTES
ANTICOAGULATION  MANAGEMENT: Discharge Review    [facility] chart reviewed for anticoagulation continuity of care    Hospital Admission on 12/31/20-1/3/21 for Pulmonary hypertension, acute respiratory failure with hypoxia, acute on chronic right sided heart failure, exacerbation of asthma.    Discharge disposition: Home    Results:    Recent labs: (last 7 days)     12/31/20  1210 01/01/21  0545 01/02/21  0513 01/03/21  0515   INR 3.94* 3.26* 1.92* 1.51*     Anticoagulation inpatient management:     less warfarin administered than maintenance regimen    Anticoagulation discharge instructions:     Warfarin dosing: home regimen continued   Bridging: No   INR goal change: No      Medication changes affecting anticoagulation: No-Metolazone started at discharge but per Lexicomp uptodate no interaction noted with warfarin. Potassium dose increased from 10 mEq to 20 mEq    Additional factors affecting anticoagulation: Yes: Diuresed with Lasix during hospital stay-weight down 3 kg from admit to discharge    Plan     No adjustment to anticoagulation plan needed  Agree with dosing adjustment on discharge    Recommended follow up is scheduled  Patient not contacted    Anticoagulation Calendar updated    Janine Velasquez RN

## 2021-01-07 ENCOUNTER — HOSPITAL ENCOUNTER (OUTPATIENT)
Dept: PHYSICAL THERAPY | Facility: CLINIC | Age: 56
Setting detail: THERAPIES SERIES
End: 2021-01-07
Attending: FAMILY MEDICINE
Payer: COMMERCIAL

## 2021-01-07 ENCOUNTER — OFFICE VISIT (OUTPATIENT)
Dept: DERMATOLOGY | Facility: CLINIC | Age: 56
End: 2021-01-07
Payer: COMMERCIAL

## 2021-01-07 ENCOUNTER — HOSPITAL ENCOUNTER (OUTPATIENT)
Dept: CARDIAC REHAB | Facility: CLINIC | Age: 56
End: 2021-01-07
Attending: FAMILY MEDICINE
Payer: COMMERCIAL

## 2021-01-07 VITALS — RESPIRATION RATE: 16 BRPM | HEART RATE: 77 BPM | DIASTOLIC BLOOD PRESSURE: 50 MMHG | SYSTOLIC BLOOD PRESSURE: 113 MMHG

## 2021-01-07 DIAGNOSIS — I87.2 VENOUS STASIS DERMATITIS OF BOTH LOWER EXTREMITIES: Primary | ICD-10-CM

## 2021-01-07 PROCEDURE — G0239 OTH RESP PROC, GROUP: HCPCS

## 2021-01-07 PROCEDURE — 999N000193 HC STATISTIC VISIT PULM REHAB

## 2021-01-07 PROCEDURE — 99203 OFFICE O/P NEW LOW 30 MIN: CPT | Performed by: PHYSICIAN ASSISTANT

## 2021-01-07 PROCEDURE — 97140 MANUAL THERAPY 1/> REGIONS: CPT | Mod: GP | Performed by: PHYSICAL THERAPIST

## 2021-01-07 RX ORDER — FLUOCINONIDE 0.5 MG/G
CREAM TOPICAL
Qty: 60 G | Refills: 1 | Status: SHIPPED | OUTPATIENT
Start: 2021-01-07

## 2021-01-07 NOTE — PATIENT INSTRUCTIONS
Venous stasis dermatitis:  Use dove sensitive skin soap.   Apply fluocinonide cream twice daily to rash as needed.   Use aquaphor at bedtime and vanicream during the day.   Start lymphedema therapy.     Recheck in 1-2 months.

## 2021-01-07 NOTE — PROGRESS NOTES
Bia Bill is an extremely pleasant 55 year old year old female patient here today for rash on legs.   Patient states this has been present for several years.  Patient reports the following symptoms:  Compression stocking in the past. She reports that she is starting lymphedema wraps next week. She uses vaseline and moisturizers on her legs. She does have issues with swelling on her legs for years. Patient has no other skin complaints today.  Remainder of the HPI, Meds, PMH, Allergies, FH, and SH was reviewed in chart.    Past Medical History:   Diagnosis Date     Acromioclavicular joint arthritis 01/25/2017     Acute bronchospasm 08/15/2016     Acute gouty arthritis 08/04/2014     Anxiety state, unspecified      Bipolar I disorder, most recent episode (or current) unspecified      Closed fracture of metatarsal bone 06/05/2007     COPD exacerbation (H) 10/29/2019     Depressive disorder, not elsewhere classified      DVT, lower extremity (H)      Headache 10/17/2014     History of stroke symptoms, stroke ruled out on imaging 2011    Admissions in 2011 and 2014 for stroke-like symptoms. Evaluated by neurology on both occasions, imaging not consistent with stroke. Thought to be due to conversion disorder in 2011.     Impingement syndrome of shoulder region 01/25/2017     Polycythemia, secondary     [place] HGB      Right shoulder pain 10/14/2014     Trochanteric bursitis of both hips 01/11/2016       Past Surgical History:   Procedure Laterality Date     BONE MARROW BIOPSY, BONE SPECIMEN, NEEDLE/TROCAR N/A 11/17/2014    Procedure: BIOPSY BONE MARROW;  Surgeon: Hay Aaron MD;  Location: [facility]     D & C  10/26/2009    with uterine ablation     ENDOVASCULAR PLACEMENT VASCULAR DEVICE Left 2004    Left leg venous stent x 3     ESOPHAGOSCOPY, GASTROSCOPY, DUODENOSCOPY (EGD), COMBINED  04/21/2014    Procedure: Gastroscopy;  Surgeon: Moris Thomas MD;  Location: [facility]     HYSTERECTOMY, PAP NO  LONGER INDICATED  01/04/2010     LAPAROSCOPIC CHOLECYSTECTOMY N/A 01/04/2019    Procedure: Laparoscopic cholecystectomy;  Surgeon: Aaron Siegel MD;  Location: [facility]     LITHOTRIPSY  2004    Lithotrypsy        Family History   Problem Relation Age of Onset     Hypertension Mother      Diabetes Mother      Blood Disease Mother      Heart Disease Mother         chf     Cerebrovascular Disease Mother      Hypertension Father      Cancer Father         lung cancer     Allergies Sister      Diabetes Sister      Depression Sister      Hypertension Sister        Social History     Socioeconomic History     Marital status:      Spouse name: Not on file     Number of children: Not on file     Years of education: Not on file     Highest education level: Not on file   Occupational History     Occupation: Homemaker   Social Needs     Financial resource strain: Not very hard     Food insecurity     Worry: Never true     Inability: Never true     Transportation needs     Medical: No     Non-medical: No   Tobacco Use     Smoking status: Current Every Day Smoker     Packs/day: 0.50     Years: 43.00     Pack years: 21.50     Types: Cigarettes     Smokeless tobacco: Never Used     Tobacco comment: started at age 11.  Quit during pregnancyX4.  Quit 3 months ago.    Substance and Sexual Activity     Alcohol use: No     Alcohol/week: 0.0 standard drinks     Comment: quit 1995     Drug use: No     Comment: past hx 22 years ago/ uppers downers, canibus     Sexual activity: Not Currently     Birth control/protection: Surgical   Lifestyle     Physical activity     Days per week: Not on file     Minutes per session: Not on file     Stress: Not on file   Relationships     Social connections     Talks on phone: Not on file     Gets together: Not on file     Attends Church service: Not on file     Active member of club or organization: Not on file     Attends meetings of clubs or organizations: Not on file     Relationship status:  Not on file     Intimate partner violence     Fear of current or ex partner: Not on file     Emotionally abused: Not on file     Physically abused: Not on file     Forced sexual activity: Not on file   Other Topics Concern      Service No     Blood Transfusions No     Caffeine Concern Yes     Comment: 5 cans, 3 cups a day     Occupational Exposure No     Hobby Hazards No     Sleep Concern No     Stress Concern No     Weight Concern Yes     Special Diet No     Back Care Yes     Comment: Dr. Rl mederos 1 time a month     Exercise Yes     Bike Helmet No     Seat Belt No     Self-Exams No     Parent/sibling w/ CABG, MI or angioplasty before 65F 55M? No   Social History Narrative    Lives in [place] with a friend who has been helping since she fell. Not working       Outpatient Encounter Medications as of 1/7/2021   Medication Sig Dispense Refill     acetaminophen (TYLENOL) 500 MG tablet Take 500-1,000 mg by mouth every 6 hours as needed for mild pain       ADVAIR -21 MCG/ACT inhaler INHALE TWO PUFFS BY MOUTH TWICE A DAY (Patient taking differently: Inhale 2 puffs into the lungs 2 times daily *Do not use more frequently than twice daily.*  Rinse mouth after use.) 12 g 3     albuterol (PROAIR HFA/PROVENTIL HFA/VENTOLIN HFA) 108 (90 Base) MCG/ACT inhaler INHALE TWO PUFFS BY MOUTH EVERY 6 HOURS AS NEEDED FOR SHORTNESS OF BREATH DIFFICULTY BREATHING OR WHEEZING (Patient taking differently: Inhale 2 puffs into the lungs every 6 hours as needed for shortness of breath / dyspnea or wheezing ) 18 g 0     albuterol (PROVENTIL) (2.5 MG/3ML) 0.083% neb solution Take 1 vial (2.5 mg) by nebulization every 6 hours as needed for shortness of breath / dyspnea or wheezing 1 Box 0     alcohol swab prep pads Use to swab area of injection/mike as directed. 100 each 3     allopurinol (ZYLOPRIM) 300 MG tablet TAKE ONE TABLET BY MOUTH ONCE DAILY (Patient taking differently: Take 300 mg by mouth daily ) 90 tablet  1     ARIPiprazole (ABILIFY) 10 MG tablet TAKE ONE TABLET BY MOUTH ONCE DAILY (Patient taking differently: Take 10 mg by mouth daily ) 90 tablet 1     blood glucose (NO BRAND SPECIFIED) test strip Use to test blood sugar 2-3x/day. To accompany: Blood Glucose Monitor Brands: per insurance. 100 strip 11     blood glucose calibration (NO BRAND SPECIFIED) solution To accompany: Blood Glucose Monitor Brands: per insurance. 1 Bottle 3     blood glucose monitoring (NO BRAND SPECIFIED) meter device kit Use to test blood sugar daily or as directed. Preferred blood glucose meter OR supplies to accompany: Blood Glucose Monitor Brands: per insurance. 1 kit 0     buPROPion (WELLBUTRIN XL) 150 MG 24 hr tablet TAKE ONE TABLET BY MOUTH EVERY EVENING 90 tablet 0     EPINEPHrine (ANY BX GENERIC EQUIV) 0.3 MG/0.3ML injection 2-pack Inject 0.3 mLs (0.3 mg) into the muscle once as needed for anaphylaxis 0.6 mL 0     fluocinonide (LIDEX) 0.05 % external cream Apply sparingly twice daily as needed to legs. 60 g 1     furosemide (LASIX) 40 MG tablet Take 60 mg every morning and 40 mg every afternoon, reassess dosage at follow-up with primary care provider 180 tablet 4     gabapentin (NEURONTIN) 300 MG capsule Take 1 capsule (300 mg) by mouth At Bedtime TAKE ONE CAPSULE BY MOUTH ONCE DAILY AT BEDTIME WITH A 600 MG TABLET FOR A TOTAL DOSE  MG 90 capsule 3     gabapentin (NEURONTIN) 600 MG tablet TAKE ONE TABLET BY MOUTH THREE TIMES A DAY (Patient taking differently: Take 600 mg by mouth 3 times daily TAKE ONE TABLET BY MOUTH THREE TIMES A DAY) 90 tablet 3     ipratropium - albuterol 0.5 mg/2.5 mg/3 mL (DUONEB) 0.5-2.5 (3) MG/3ML neb solution INHALE 1 VIAL VIA NEBULIZER EVERY 6 HOURS AS NEEDED FOR FOR SHORTNESS OF BREATH / DYSPNEA OR WHEEZING 90 vial 0     lamoTRIgine (LAMICTAL) 100 MG tablet TAKE ONE TABLET BY MOUTH ONCE DAILY (Patient taking differently: Take 100 mg by mouth every evening ) 90 tablet 1     levothyroxine  (SYNTHROID/LEVOTHROID) 175 MCG tablet TAKE ONE TABLET BY MOUTH ONCE DAILY 90 tablet 0     metolazone (ZAROXOLYN) 5 MG tablet Take 1 tablet (5 mg) by mouth See Admin Instructions 30 tablet 1     nitroGLYcerin (NITROSTAT) 0.4 MG sublingual tablet For chest pain place 1 tablet under the tongue every 5 minutes for 3 doses. If symptoms persist 5 minutes after 1st dose call 911. 15 tablet 0     omeprazole (PRILOSEC) 20 MG DR capsule Take 1 capsule (20 mg) by mouth 2 times daily 60 capsule 11     order for DME Equipment being ordered:x2 [employer] 30/40mmHg compression wraps with x2 extra prs of compression liners 1 each 0     order for DME Equipment being ordered: Nebulizer 1 Device 0     order for DME Equipment being ordered: CPAP  AIRSENSE 10  5-18 CM H20  # [id number]   DN# [id number]       order for DME Equipment being ordered: DEPENDS SIZE LARGE 60 Month 5     order for DME Equipment being ordered: INCONTINENCE PADS   QID 1 Month 11     potassium chloride ER (KLOR-CON M) 10 MEQ CR tablet Take 2 tablets (20 mEq) by mouth daily 90 tablet 2     pramipexole (MIRAPEX) 0.125 MG tablet Take 1-20 tablets (0.125-2.5 mg) by mouth At Bedtime TAKE 1-2 TABLETS BY MOUTH AT BEDTIME (Patient taking differently: Take 0.125-0.25 mg by mouth At Bedtime TAKE 1-2 TABLETS BY MOUTH AT BEDTIME) 180 tablet 1     simvastatin (ZOCOR) 20 MG tablet TAKE ONE TABLET BY MOUTH EVERY NIGHT AT BEDTIME (Patient taking differently: Take 20 mg by mouth At Bedtime ) 90 tablet 2     thin (NO BRAND SPECIFIED) lancets Use with lanceting device. To accompany: Blood Glucose Monitor Brands: per insurance. 1 each 6     traZODone (DESYREL) 150 MG tablet TAKE ONE TABLET BY MOUTH EVERY NIGHT AT BEDTIME (Patient taking differently: Take 150 mg by mouth At Bedtime ) 90 tablet 1     VITAMIN D3 25 MCG (1000 UT) tablet TAKE ONE TABLET BY MOUTH ONCE DAILY (Patient taking differently: Take 1 tablet by mouth every evening ) 100 tablet 2     warfarin ANTICOAGULANT (FISHTOVEN  ANTICOAGULANT) 5 MG tablet Take 3 tablets today (15 mg) then resume 1 & 1/2 tablets (7.5 mg) by mouth every Tuesday and Friday, 2 tablets (10 mg) on all other days of the week or as directed by the anticoagulation clinic. 157 tablet 1     No facility-administered encounter medications on file as of 1/7/2021.              Review Of Systems  Skin: As above  Eyes: negative  Ears/Nose/Throat: negative  Respiratory: No shortness of breath, dyspnea on exertion, cough, or hemoptysis  Cardiovascular: negative  Gastrointestinal: negative  Genitourinary: negative  Musculoskeletal: negative  Neurologic: negative  Psychiatric: negative  Hematologic/Lymphatic/Immunologic: negative  Endocrine: negative      O:   NAD, WDWN, Alert & Oriented, Mood & Affect wnl, Vitals stable   Here today alone   /50 (BP Location: Right arm, Patient Position: Sitting, Cuff Size: Adult Large)   Pulse 77   Resp 16   LMP 09/27/2009    General appearance normal   Vitals stable   Alert, oriented and in no acute distress  Red scaly plaques on feet and legs       Eyes: Conjunctivae/lids:Normal     ENT: Lips: normal    MSK:Normal    Pulm: Breathing Normal    Neuro/Psych: Orientation:Alert and Orientedx3 ; Mood/Affect:normal   A/P:  1. Venous stasis dermatitis   Venous stasis dermatitis:  Use dove sensitive skin soap.   Apply fluocinonide cream twice daily to rash as needed.   Use aquaphor at bedtime and vanicream during the day.   Start lymphedema therapy next week     Recheck in 1-2 months.   Once swelling is under control we can order new compression stockings.

## 2021-01-07 NOTE — LETTER
1/7/2021         RE: Bia Bill  [address] [facility] [id number]        Dear Colleague,    Thank you for referring your patient, Bia Bill, to the [facility]. Please see a copy of my visit note below.    Bia Bill is an extremely pleasant 55 year old year old female patient here today for rash on legs.   Patient states this has been present for several years.  Patient reports the following symptoms:  Compression stocking in the past. She reports that she is starting lymphedema wraps next week. She uses vaseline and moisturizers on her legs. She does have issues with swelling on her legs for years. Patient has no other skin complaints today.  Remainder of the HPI, Meds, PMH, Allergies, FH, and SH was reviewed in chart.    Past Medical History:   Diagnosis Date     Acromioclavicular joint arthritis 01/25/2017     Acute bronchospasm 08/15/2016     Acute gouty arthritis 08/04/2014     Anxiety state, unspecified      Bipolar I disorder, most recent episode (or current) unspecified      Closed fracture of metatarsal bone 06/05/2007     COPD exacerbation (H) 10/29/2019     Depressive disorder, not elsewhere classified      DVT, lower extremity (H)      Headache 10/17/2014     History of stroke symptoms, stroke ruled out on imaging 2011    Admissions in 2011 and 2014 for stroke-like symptoms. Evaluated by neurology on both occasions, imaging not consistent with stroke. Thought to be due to conversion disorder in 2011.     Impingement syndrome of shoulder region 01/25/2017     Polycythemia, secondary     [place] HGB      Right shoulder pain 10/14/2014     Trochanteric bursitis of both hips 01/11/2016       Past Surgical History:   Procedure Laterality Date     BONE MARROW BIOPSY, BONE SPECIMEN, NEEDLE/TROCAR N/A 11/17/2014    Procedure: BIOPSY BONE MARROW;  Surgeon: Hay Aaron MD;  Location: [facility]     D & C  10/26/2009    with uterine ablation      ENDOVASCULAR PLACEMENT VASCULAR DEVICE Left 2004    Left leg venous stent x 3     ESOPHAGOSCOPY, GASTROSCOPY, DUODENOSCOPY (EGD), COMBINED  04/21/2014    Procedure: Gastroscopy;  Surgeon: Moris Thomas MD;  Location: [facility]     HYSTERECTOMY, PAP NO LONGER INDICATED  01/04/2010     LAPAROSCOPIC CHOLECYSTECTOMY N/A 01/04/2019    Procedure: Laparoscopic cholecystectomy;  Surgeon: Aaron Siegel MD;  Location: [facility]     LITHOTRIPSY  2004    Lithotrypsy        Family History   Problem Relation Age of Onset     Hypertension Mother      Diabetes Mother      Blood Disease Mother      Heart Disease Mother         chf     Cerebrovascular Disease Mother      Hypertension Father      Cancer Father         lung cancer     Allergies Sister      Diabetes Sister      Depression Sister      Hypertension Sister        Social History     Socioeconomic History     Marital status:      Spouse name: Not on file     Number of children: Not on file     Years of education: Not on file     Highest education level: Not on file   Occupational History     Occupation: Homemaker   Social Needs     Financial resource strain: Not very hard     Food insecurity     Worry: Never true     Inability: Never true     Transportation needs     Medical: No     Non-medical: No   Tobacco Use     Smoking status: Current Every Day Smoker     Packs/day: 0.50     Years: 43.00     Pack years: 21.50     Types: Cigarettes     Smokeless tobacco: Never Used     Tobacco comment: started at age 11.  Quit during pregnancyX4.  Quit 3 months ago.    Substance and Sexual Activity     Alcohol use: No     Alcohol/week: 0.0 standard drinks     Comment: quit 1995     Drug use: No     Comment: past hx 22 years ago/ uppers downers, canibus     Sexual activity: Not Currently     Birth control/protection: Surgical   Lifestyle     Physical activity     Days per week: Not on file     Minutes per session: Not on file     Stress: Not on file   Relationships     Social  connections     Talks on phone: Not on file     Gets together: Not on file     Attends Christianity service: Not on file     Active member of club or organization: Not on file     Attends meetings of clubs or organizations: Not on file     Relationship status: Not on file     Intimate partner violence     Fear of current or ex partner: Not on file     Emotionally abused: Not on file     Physically abused: Not on file     Forced sexual activity: Not on file   Other Topics Concern      Service No     Blood Transfusions No     Caffeine Concern Yes     Comment: 5 cans, 3 cups a day     Occupational Exposure No     Hobby Hazards No     Sleep Concern No     Stress Concern No     Weight Concern Yes     Special Diet No     Back Care Yes     Comment: Dr. Rl mederos 1 time a month     Exercise Yes     Bike Helmet No     Seat Belt No     Self-Exams No     Parent/sibling w/ CABG, MI or angioplasty before 65F 55M? No   Social History Narrative    Lives in [place] with a friend who has been helping since she fell. Not working       Outpatient Encounter Medications as of 1/7/2021   Medication Sig Dispense Refill     acetaminophen (TYLENOL) 500 MG tablet Take 500-1,000 mg by mouth every 6 hours as needed for mild pain       ADVAIR -21 MCG/ACT inhaler INHALE TWO PUFFS BY MOUTH TWICE A DAY (Patient taking differently: Inhale 2 puffs into the lungs 2 times daily *Do not use more frequently than twice daily.*  Rinse mouth after use.) 12 g 3     albuterol (PROAIR HFA/PROVENTIL HFA/VENTOLIN HFA) 108 (90 Base) MCG/ACT inhaler INHALE TWO PUFFS BY MOUTH EVERY 6 HOURS AS NEEDED FOR SHORTNESS OF BREATH DIFFICULTY BREATHING OR WHEEZING (Patient taking differently: Inhale 2 puffs into the lungs every 6 hours as needed for shortness of breath / dyspnea or wheezing ) 18 g 0     albuterol (PROVENTIL) (2.5 MG/3ML) 0.083% neb solution Take 1 vial (2.5 mg) by nebulization every 6 hours as needed for shortness of breath /  dyspnea or wheezing 1 Box 0     alcohol swab prep pads Use to swab area of injection/mike as directed. 100 each 3     allopurinol (ZYLOPRIM) 300 MG tablet TAKE ONE TABLET BY MOUTH ONCE DAILY (Patient taking differently: Take 300 mg by mouth daily ) 90 tablet 1     ARIPiprazole (ABILIFY) 10 MG tablet TAKE ONE TABLET BY MOUTH ONCE DAILY (Patient taking differently: Take 10 mg by mouth daily ) 90 tablet 1     blood glucose (NO BRAND SPECIFIED) test strip Use to test blood sugar 2-3x/day. To accompany: Blood Glucose Monitor Brands: per insurance. 100 strip 11     blood glucose calibration (NO BRAND SPECIFIED) solution To accompany: Blood Glucose Monitor Brands: per insurance. 1 Bottle 3     blood glucose monitoring (NO BRAND SPECIFIED) meter device kit Use to test blood sugar daily or as directed. Preferred blood glucose meter OR supplies to accompany: Blood Glucose Monitor Brands: per insurance. 1 kit 0     buPROPion (WELLBUTRIN XL) 150 MG 24 hr tablet TAKE ONE TABLET BY MOUTH EVERY EVENING 90 tablet 0     EPINEPHrine (ANY BX GENERIC EQUIV) 0.3 MG/0.3ML injection 2-pack Inject 0.3 mLs (0.3 mg) into the muscle once as needed for anaphylaxis 0.6 mL 0     fluocinonide (LIDEX) 0.05 % external cream Apply sparingly twice daily as needed to legs. 60 g 1     furosemide (LASIX) 40 MG tablet Take 60 mg every morning and 40 mg every afternoon, reassess dosage at follow-up with primary care provider 180 tablet 4     gabapentin (NEURONTIN) 300 MG capsule Take 1 capsule (300 mg) by mouth At Bedtime TAKE ONE CAPSULE BY MOUTH ONCE DAILY AT BEDTIME WITH A 600 MG TABLET FOR A TOTAL DOSE  MG 90 capsule 3     gabapentin (NEURONTIN) 600 MG tablet TAKE ONE TABLET BY MOUTH THREE TIMES A DAY (Patient taking differently: Take 600 mg by mouth 3 times daily TAKE ONE TABLET BY MOUTH THREE TIMES A DAY) 90 tablet 3     ipratropium - albuterol 0.5 mg/2.5 mg/3 mL (DUONEB) 0.5-2.5 (3) MG/3ML neb solution INHALE 1 VIAL VIA NEBULIZER EVERY 6  HOURS AS NEEDED FOR FOR SHORTNESS OF BREATH / DYSPNEA OR WHEEZING 90 vial 0     lamoTRIgine (LAMICTAL) 100 MG tablet TAKE ONE TABLET BY MOUTH ONCE DAILY (Patient taking differently: Take 100 mg by mouth every evening ) 90 tablet 1     levothyroxine (SYNTHROID/LEVOTHROID) 175 MCG tablet TAKE ONE TABLET BY MOUTH ONCE DAILY 90 tablet 0     metolazone (ZAROXOLYN) 5 MG tablet Take 1 tablet (5 mg) by mouth See Admin Instructions 30 tablet 1     nitroGLYcerin (NITROSTAT) 0.4 MG sublingual tablet For chest pain place 1 tablet under the tongue every 5 minutes for 3 doses. If symptoms persist 5 minutes after 1st dose call 911. 15 tablet 0     omeprazole (PRILOSEC) 20 MG DR capsule Take 1 capsule (20 mg) by mouth 2 times daily 60 capsule 11     order for DME Equipment being ordered:x2 [employer] 30/40mmHg compression wraps with x2 extra prs of compression liners 1 each 0     order for DME Equipment being ordered: Nebulizer 1 Device 0     order for DME Equipment being ordered: CPAP  AIRSENSE 10  5-18 CM H20  # [id number]   DN# [id number]       order for DME Equipment being ordered: DEPENDS SIZE LARGE 60 Month 5     order for DME Equipment being ordered: INCONTINENCE PADS   QID 1 Month 11     potassium chloride ER (KLOR-CON M) 10 MEQ CR tablet Take 2 tablets (20 mEq) by mouth daily 90 tablet 2     pramipexole (MIRAPEX) 0.125 MG tablet Take 1-20 tablets (0.125-2.5 mg) by mouth At Bedtime TAKE 1-2 TABLETS BY MOUTH AT BEDTIME (Patient taking differently: Take 0.125-0.25 mg by mouth At Bedtime TAKE 1-2 TABLETS BY MOUTH AT BEDTIME) 180 tablet 1     simvastatin (ZOCOR) 20 MG tablet TAKE ONE TABLET BY MOUTH EVERY NIGHT AT BEDTIME (Patient taking differently: Take 20 mg by mouth At Bedtime ) 90 tablet 2     thin (NO BRAND SPECIFIED) lancets Use with lanceting device. To accompany: Blood Glucose Monitor Brands: per insurance. 1 each 6     traZODone (DESYREL) 150 MG tablet TAKE ONE TABLET BY MOUTH EVERY NIGHT AT BEDTIME (Patient taking  differently: Take 150 mg by mouth At Bedtime ) 90 tablet 1     VITAMIN D3 25 MCG (1000 UT) tablet TAKE ONE TABLET BY MOUTH ONCE DAILY (Patient taking differently: Take 1 tablet by mouth every evening ) 100 tablet 2     warfarin ANTICOAGULANT (JANTOVEN ANTICOAGULANT) 5 MG tablet Take 3 tablets today (15 mg) then resume 1 & 1/2 tablets (7.5 mg) by mouth every Tuesday and Friday, 2 tablets (10 mg) on all other days of the week or as directed by the anticoagulation clinic. 157 tablet 1     No facility-administered encounter medications on file as of 1/7/2021.              Review Of Systems  Skin: As above  Eyes: negative  Ears/Nose/Throat: negative  Respiratory: No shortness of breath, dyspnea on exertion, cough, or hemoptysis  Cardiovascular: negative  Gastrointestinal: negative  Genitourinary: negative  Musculoskeletal: negative  Neurologic: negative  Psychiatric: negative  Hematologic/Lymphatic/Immunologic: negative  Endocrine: negative      O:   NAD, WDWN, Alert & Oriented, Mood & Affect wnl, Vitals stable   Here today alone   /50 (BP Location: Right arm, Patient Position: Sitting, Cuff Size: Adult Large)   Pulse 77   Resp 16   LMP 09/27/2009    General appearance normal   Vitals stable   Alert, oriented and in no acute distress  Red scaly plaques on feet and legs       Eyes: Conjunctivae/lids:Normal     ENT: Lips: normal    MSK:Normal    Pulm: Breathing Normal    Neuro/Psych: Orientation:Alert and Orientedx3 ; Mood/Affect:normal   A/P:  1. Venous stasis dermatitis   Venous stasis dermatitis:  Use dove sensitive skin soap.   Apply fluocinonide cream twice daily to rash as needed.   Use aquaphor at bedtime and vanicream during the day.   Start lymphedema therapy next week     Recheck in 1-2 months.   Once swelling is under control we can order new compression stockings.       Again, thank you for allowing me to participate in the care of your patient.        Sincerely,        Saranya Kelley PA-C

## 2021-01-07 NOTE — NURSING NOTE
"Initial /50 (BP Location: Right arm, Patient Position: Sitting, Cuff Size: Adult Large)   Pulse 77   Resp 16   LMP 09/27/2009  Estimated body mass index is 45.75 kg/m  as calculated from the following:    Height as of 12/31/20: 1.473 m (4' 10\").    Weight as of 1/3/21: 99.3 kg (218 lb 14.7 oz). .    Lotus Lancaster, [facility]    "

## 2021-01-08 ENCOUNTER — TELEPHONE (OUTPATIENT)
Dept: FAMILY MEDICINE | Facility: CLINIC | Age: 56
End: 2021-01-08

## 2021-01-08 NOTE — TELEPHONE ENCOUNTER
ANTICOAGULATION     Bia Bill is overdue for INR check.      Reminder letter sent    Reina Forrest RN

## 2021-01-08 NOTE — LETTER
January 8, 2021      Bia Bill  [address] [address] [place] [id number]      Dear Bia,    We are contacting you because our records show you were due for an INR on 1/7/2021.      There are potentially serious risks when taking warfarin without careful monitoring, and we want to make sure you are safely managed.    Please call the INR clinic at [phone] and we will be happy to help you schedule an appointment.  If there has been a change in your care, or other concerns, please let us know so we can help and/or update our records.    Sincerely,        Kd Leong MD

## 2021-01-10 ENCOUNTER — NURSE TRIAGE (OUTPATIENT)
Dept: NURSING | Facility: CLINIC | Age: 56
End: 2021-01-10

## 2021-01-11 ENCOUNTER — VIRTUAL VISIT (OUTPATIENT)
Dept: FAMILY MEDICINE | Facility: CLINIC | Age: 56
End: 2021-01-11
Payer: COMMERCIAL

## 2021-01-11 DIAGNOSIS — F31.62 MODERATE MIXED BIPOLAR I DISORDER (H): Chronic | ICD-10-CM

## 2021-01-11 DIAGNOSIS — E87.6 HYPOKALEMIA: ICD-10-CM

## 2021-01-11 DIAGNOSIS — L60.3 DYSTROPHIC NAIL: Primary | ICD-10-CM

## 2021-01-11 DIAGNOSIS — M10.9 ACUTE GOUTY ARTHRITIS: ICD-10-CM

## 2021-01-11 DIAGNOSIS — E11.9 TYPE 2 DIABETES MELLITUS WITHOUT COMPLICATION, WITHOUT LONG-TERM CURRENT USE OF INSULIN (H): ICD-10-CM

## 2021-01-11 PROCEDURE — 99214 OFFICE O/P EST MOD 30 MIN: CPT | Mod: 95 | Performed by: FAMILY MEDICINE

## 2021-01-11 RX ORDER — BUPROPION HYDROCHLORIDE 150 MG/1
TABLET ORAL
Qty: 90 TABLET | Refills: 3 | Status: SHIPPED | OUTPATIENT
Start: 2021-01-11

## 2021-01-11 RX ORDER — METFORMIN HCL 500 MG
500 TABLET, EXTENDED RELEASE 24 HR ORAL 2 TIMES DAILY WITH MEALS
Qty: 180 TABLET | Refills: 0 | Status: SHIPPED | OUTPATIENT
Start: 2021-01-11 | End: 2021-05-10

## 2021-01-11 RX ORDER — ALLOPURINOL 300 MG/1
1 TABLET ORAL DAILY
Qty: 90 TABLET | Refills: 1 | Status: SHIPPED | OUTPATIENT
Start: 2021-01-11 | End: 2021-12-15

## 2021-01-11 ASSESSMENT — PAIN SCALES - GENERAL: PAINLEVEL: EXTREME PAIN (8)

## 2021-01-11 NOTE — PROGRESS NOTES
Norah is a 55 year old who is being evaluated via a billable telephone visit.      What phone number would you like to be contacted at? [phone]. Use [employer]  How would you like to obtain your AVS? Mail a copy  Assessment & Plan     Acute gouty arthritis  Good control.  Continue currant medications, continue to monito   - allopurinol (ZYLOPRIM) 300 MG tablet; Take 1 tablet (300 mg) by mouth daily    Moderate mixed bipolar I disorder (H)  Good control.  Continue currant medications, continue to monito I am doubting this diagnosis.    - buPROPion (WELLBUTRIN XL) 150 MG 24 hr tablet; TAKE ONE TABLET BY MOUTH EVERY EVENING    Dystrophic nail  Trimmed today.  Needs regular foot. Care diabetic shoes  Insoles?  - [facility]  Referral; Future    Type 2 diabetes mellitus without complication, without long-term current use of insulin (H)    - metFORMIN (GLUCOPHAGE-XR) 500 MG 24 hr tablet; Take 1 tablet (500 mg) by mouth 2 times daily (with meals)    Hypokalemia  Good control.  Continue currant medications, continue to monito   - Basic metabolic panel; Future    Review of prior external note(s) from - Outside records from hematology  Review of the result(s) of each unique test - bmp, hgb          6 minutes spent on the date of the encounter doing chart review, review of outside records and review of test results       No follow-ups on file.    Kd Leong MD  [facility] NICHELLE Almazan is a 55 year old who presents to clinic today for the following health issues     HPI       Diabetes Follow-up    How often are you checking your blood sugar? Two times daily  Blood sugar testing frequency justification:  Uncontrolled diabetes  What time of day are you checking your blood sugars (select all that apply)?  Before meals  Have you had any blood sugars above 200?  Yes, has been running 194-295  Have you had any blood sugars below 70?  No    What symptoms do you notice when your  blood sugar is low?  None    What concerns do you have today about your diabetes? Blood sugar is often over 200     Do you have any of these symptoms? (Select all that apply)  Numbness in feet, Burning in feet and Blurry vision              Hyperlipidemia Follow-Up      Are you regularly taking any medication or supplement to lower your cholesterol?   Yes- simvastatin    Are you having muscle aches or other side effects that you think could be caused by your cholesterol lowering medication?  No    Hypertension Follow-up      Do you check your blood pressure regularly outside of the clinic? Yes     Are you following a low salt diet? Yes    Are your blood pressures ever more than 140 on the top number (systolic) OR more   than 90 on the bottom number (diastolic), for example 140/90? No    BP Readings from Last 2 Encounters:   01/07/21 113/50   01/03/21 130/51     Hemoglobin A1C (%)   Date Value   11/08/2020 5.6   06/22/2020 6.2 (H)     LDL Cholesterol Calculated (mg/dL)   Date Value   06/22/2020 36   09/27/2019 48         How many servings of fruits and vegetables do you eat daily?  2-3    On average, how many sweetened beverages do you drink each day (Examples: soda, juice, sweet tea, etc.  Do NOT count diet or artificially sweetened beverages)?   4    How many days per week do you exercise enough to make your heart beat faster? 7    How many minutes a day do you exercise enough to make your heart beat faster? 30 - 60    How many days per week do you miss taking your medication? 0        Review of Systems   Constitutional, HEENT, cardiovascular, pulmonary, gi and gu systems are negative, except as otherwise noted.      Objective           Vitals:  No vitals were obtained today due to virtual visit.    Physical Exam   healthy, alert and no distress  PSYCH: Alert and oriented times 3; coherent speech, normal   rate and volume, able to articulate logical thoughts, able   to abstract reason, no tangential thoughts, no  hallucinations   or delusions  Her affect is normal  RESP: No cough, no audible wheezing, able to talk in full sentences  Remainder of exam unable to be completed due to telephone visits                Phone call duration: 8 minutes

## 2021-01-11 NOTE — TELEPHONE ENCOUNTER
"Triage Call:   Pt is calling stating her blood sugar is high 295 and it has risen since 4:30 (pt did have a meal since then), pts last glass of water was about 4 hours ago. Pt states she is weak and when asked if she can stand she stated \"I can stand but I would need to be very careful\". Pt feels faint, dizziness, headache. She states her BS normally ranges 100-150 but it has been in the 200s all weekend. Advised for pt to have a glass of water while on call is paged.     Paging on call Dr Mable Doty @ 11:19pm   If the pt is feeling poorly enough she should go to the ER. Pt can try drinking some water, see if symptoms improve, recheck blood sugar in 30 minutes to an hour. F/u with primary provider tomorrow.     Pt was called back and advised on pts recommendations. Pt will call to make an appointment tomorrow. Pt advised to call back if she has any questions or concerns.     Lima Madsen RN Nursing Advisor 1/10/2021 11:38 PM       Reason for Disposition    Patient sounds very sick or weak to the triager    Additional Information    Negative: Unconscious or difficult to awaken    Negative: Acting confused (e.g., disoriented, slurred speech)    Negative: Very weak (e.g., can't stand)    Negative: Sounds like a life-threatening emergency to the triager    Negative: [1] Vomiting AND [2] signs of dehydration (e.g., very dry mouth, lightheaded, dark urine)    Negative: [1] Blood glucose > 240 mg/dL (13.3 mmol/L) AND [2] rapid breathing    Negative: Blood glucose > 500 mg/dL (27.8 mmol/L)    Negative: [1] Blood glucose > 240 mg/dL (13.3 mmol/L) AND [2] urine ketones moderate-large (or more than 1+)    Negative: [1] Blood glucose > 240 mg/dL (13.3 mmol/L) AND [2] blood ketones > 1.4 mmol/L    Negative: [1] Blood glucose > 240 mg/dL (13.3 mmol/L) AND [2] vomiting AND [3] unable to check for ketones (in blood or urine)    Negative: [1] New onset Diabetes suspected (e.g., frequent urination, weak, weight loss) AND [2] " vomiting or rapid breathing    Negative: Vomiting lasts > 4 hours    Protocols used: DIABETES - HIGH BLOOD SUGAR-A-AH

## 2021-01-12 ENCOUNTER — PATIENT OUTREACH (OUTPATIENT)
Dept: NURSING | Facility: CLINIC | Age: 56
End: 2021-01-12
Payer: COMMERCIAL

## 2021-01-12 ENCOUNTER — HOSPITAL ENCOUNTER (OUTPATIENT)
Dept: CARDIAC REHAB | Facility: CLINIC | Age: 56
End: 2021-01-12
Attending: FAMILY MEDICINE
Payer: COMMERCIAL

## 2021-01-12 PROCEDURE — G0239 OTH RESP PROC, GROUP: HCPCS

## 2021-01-12 PROCEDURE — 999N000193 HC STATISTIC VISIT PULM REHAB

## 2021-01-12 ASSESSMENT — ACTIVITIES OF DAILY LIVING (ADL): DEPENDENT_IADLS:: INDEPENDENT

## 2021-01-12 NOTE — PROGRESS NOTES
"Clinic Care Coordination Contact    Follow Up Progress Note      Assessment: Patient calling clinic care coordinator.     Patient states she is doing well. She is disappointed because she has not been able to keep her blood sugars under 200. She states PCP started her back on Metformin yesterday. She is on 500 mg BID. Patient confirms this is how she is taking it. She was hoping she would not need it.     Patient is weighing daily she currently weighs 209.9. She started at 247.2. She understands a lot of this was \"water weight\" but she is trying really hard to continue to lose. She wants to be under 200 pounds.   We discussed low carbohydrate diet. She states she understands.  She is avoiding sugar.     Patient continues to go to pulmonary rehab twice a week. She denies nay shortness of breath.     Patient states her legs look good except for the left leg being red and flaky. She states she was given some steroid cream to use before putting on her compression wraps. She states it burns when she first applies it. She denies any open areas.   Patient states she has some \"open areas\" on her right forearm.  She is not sure what this is from. We discussed watching for infection and making sure she is keeping the area clean.      Goals addressed this encounter: Improving  Goals Addressed                 This Visit's Progress      #1 (pt-stated)   60%     Goal Statement: I want my shortness of breath to improve    Date goal set: 11/1/19    Measure of Success: When I am not short of breath    Barriers: COPD    Strengths: Motivated    Date to Achieve By: March 2021    Patient expressed understanding of goal: Yes    Action steps to achieve this goal    1. I will use nebulizer as instructed  2. I will see PCP  as scheduled  3. I will use my asthma action plan         #3 Improve chronic symptoms (pt-stated)   50%     Goal Statement: I want my edema to improve.  Date goal set: 12/5/2019   Measure of Success: I will be able to " put support stockings on by myself.  Barriers: lymphedema  Strengths: care coordination, lymphedema clinic  Date to Achieve By: March 2021  Patient expressed understanding of goal: yes    Action steps to achieve this goal  1. I will elevate my legs throughout the day  2. I will attend lymphedema clinic appointments  3. I will take my diuretics as prescribed         #4 Monitoring (pt-stated)   40%     Goal Statement: I will weigh myself every day.  Date goal set: 12/5/2019   Measure of Success: Consistent record of daily weights.  Barriers: Does not like scale  Strengths: care coordination support  Date to Achieve By: March 2021  Patient expressed understanding of goal: yes    Action steps to achieve this goal  1. I will weigh myself every morning.  2. I will write my weight down in a notebook.  3. I will take an extra 20-40 mg of Lasix if my weight increases by 3 lbs.            Intervention/Education provided during outreach: Encouragement to continue weight loss     Outreach Frequency: monthly    Plan:   Patient will take metformin.  Patient will continue to have low carb diet.    Care Coordinator will follow up in one month.    Michela Whitaker RN, [facility] RN Coordinator  [facility] [address]   1/12/2021    [phone]

## 2021-01-14 ENCOUNTER — HOSPITAL ENCOUNTER (OUTPATIENT)
Dept: PHYSICAL THERAPY | Facility: CLINIC | Age: 56
Setting detail: THERAPIES SERIES
End: 2021-01-14
Attending: FAMILY MEDICINE
Payer: COMMERCIAL

## 2021-01-14 ENCOUNTER — HOSPITAL ENCOUNTER (OUTPATIENT)
Dept: CARDIAC REHAB | Facility: CLINIC | Age: 56
End: 2021-01-14
Attending: FAMILY MEDICINE
Payer: COMMERCIAL

## 2021-01-14 PROCEDURE — 999N000193 HC STATISTIC VISIT PULM REHAB: Performed by: REHABILITATION PRACTITIONER

## 2021-01-14 PROCEDURE — 97140 MANUAL THERAPY 1/> REGIONS: CPT | Mod: GP | Performed by: PHYSICAL THERAPIST

## 2021-01-14 PROCEDURE — G0239 OTH RESP PROC, GROUP: HCPCS | Performed by: REHABILITATION PRACTITIONER

## 2021-01-18 DIAGNOSIS — F33.1 MAJOR DEPRESSIVE DISORDER, RECURRENT EPISODE, MODERATE (H): ICD-10-CM

## 2021-01-18 RX ORDER — LAMOTRIGINE 100 MG/1
TABLET ORAL
Qty: 90 TABLET | Refills: 1 | Status: SHIPPED | OUTPATIENT
Start: 2021-01-18

## 2021-01-19 ENCOUNTER — VIRTUAL VISIT (OUTPATIENT)
Dept: PSYCHOLOGY | Facility: CLINIC | Age: 56
End: 2021-01-19
Payer: COMMERCIAL

## 2021-01-19 ENCOUNTER — HOSPITAL ENCOUNTER (OUTPATIENT)
Dept: CARDIAC REHAB | Facility: CLINIC | Age: 56
End: 2021-01-19
Attending: FAMILY MEDICINE
Payer: COMMERCIAL

## 2021-01-19 ENCOUNTER — HOSPITAL ENCOUNTER (OUTPATIENT)
Dept: PHYSICAL THERAPY | Facility: CLINIC | Age: 56
Setting detail: THERAPIES SERIES
End: 2021-01-19
Attending: FAMILY MEDICINE
Payer: COMMERCIAL

## 2021-01-19 ENCOUNTER — ANTICOAGULATION THERAPY VISIT (OUTPATIENT)
Dept: ANTICOAGULATION | Facility: CLINIC | Age: 56
End: 2021-01-19

## 2021-01-19 DIAGNOSIS — F31.62 MODERATE MIXED BIPOLAR I DISORDER (H): Primary | ICD-10-CM

## 2021-01-19 DIAGNOSIS — Z86.718 HISTORY OF DEEP VENOUS THROMBOSIS: ICD-10-CM

## 2021-01-19 DIAGNOSIS — D75.1 ERYTHROCYTOSIS: ICD-10-CM

## 2021-01-19 DIAGNOSIS — D69.6 THROMBOCYTOPENIA (H): ICD-10-CM

## 2021-01-19 DIAGNOSIS — E87.6 HYPOKALEMIA: ICD-10-CM

## 2021-01-19 LAB
ANION GAP SERPL CALCULATED.3IONS-SCNC: 4 MMOL/L (ref 3–14)
BASOPHILS # BLD AUTO: 0 10E9/L (ref 0–0.2)
BASOPHILS NFR BLD AUTO: 0.5 %
BUN SERPL-MCNC: 9 MG/DL (ref 7–30)
CALCIUM SERPL-MCNC: 9.1 MG/DL (ref 8.5–10.1)
CHLORIDE SERPL-SCNC: 104 MMOL/L (ref 94–109)
CO2 SERPL-SCNC: 30 MMOL/L (ref 20–32)
CREAT SERPL-MCNC: 0.71 MG/DL (ref 0.52–1.04)
DIFFERENTIAL METHOD BLD: ABNORMAL
EOSINOPHIL # BLD AUTO: 0.1 10E9/L (ref 0–0.7)
EOSINOPHIL NFR BLD AUTO: 1.1 %
ERYTHROCYTE [DISTWIDTH] IN BLOOD BY AUTOMATED COUNT: 20.2 % (ref 10–15)
GFR SERPL CREATININE-BSD FRML MDRD: >90 ML/MIN/{1.73_M2}
GLUCOSE SERPL-MCNC: 101 MG/DL (ref 70–99)
HCT VFR BLD AUTO: 43.5 % (ref 35–47)
HGB BLD-MCNC: 12.6 G/DL (ref 11.7–15.7)
INR PPP: 2.66 (ref 0.86–1.14)
LYMPHOCYTES # BLD AUTO: 1.5 10E9/L (ref 0.8–5.3)
LYMPHOCYTES NFR BLD AUTO: 23.2 %
MCH RBC QN AUTO: 24 PG (ref 26.5–33)
MCHC RBC AUTO-ENTMCNC: 29 G/DL (ref 31.5–36.5)
MCV RBC AUTO: 83 FL (ref 78–100)
MONOCYTES # BLD AUTO: 0.5 10E9/L (ref 0–1.3)
MONOCYTES NFR BLD AUTO: 7.2 %
NEUTROPHILS # BLD AUTO: 4.3 10E9/L (ref 1.6–8.3)
NEUTROPHILS NFR BLD AUTO: 68 %
PLATELET # BLD AUTO: 143 10E9/L (ref 150–450)
POTASSIUM SERPL-SCNC: 3.2 MMOL/L (ref 3.4–5.3)
RBC # BLD AUTO: 5.25 10E12/L (ref 3.8–5.2)
SODIUM SERPL-SCNC: 138 MMOL/L (ref 133–144)
WBC # BLD AUTO: 6.4 10E9/L (ref 4–11)

## 2021-01-19 PROCEDURE — 36415 COLL VENOUS BLD VENIPUNCTURE: CPT | Performed by: FAMILY MEDICINE

## 2021-01-19 PROCEDURE — 999N000193 HC STATISTIC VISIT PULM REHAB

## 2021-01-19 PROCEDURE — 80048 BASIC METABOLIC PNL TOTAL CA: CPT | Performed by: FAMILY MEDICINE

## 2021-01-19 PROCEDURE — G0239 OTH RESP PROC, GROUP: HCPCS

## 2021-01-19 PROCEDURE — 85610 PROTHROMBIN TIME: CPT | Performed by: FAMILY MEDICINE

## 2021-01-19 PROCEDURE — 90834 PSYTX W PT 45 MINUTES: CPT | Mod: 95 | Performed by: PSYCHOLOGIST

## 2021-01-19 PROCEDURE — 97140 MANUAL THERAPY 1/> REGIONS: CPT | Mod: GP | Performed by: PHYSICAL THERAPIST

## 2021-01-19 PROCEDURE — 85025 COMPLETE CBC W/AUTO DIFF WBC: CPT | Performed by: FAMILY MEDICINE

## 2021-01-19 PROCEDURE — 99207 PR NO CHARGE NURSE ONLY: CPT

## 2021-01-19 NOTE — PROGRESS NOTES
ANTICOAGULATION FOLLOW-UP CLINIC VISIT    Patient Name:  Bia Bill  Date:  2021  Contact Type:  Telephone/ Left DVM    SUBJECTIVE:  Patient Findings     Comments:  Left DVM.   Patient will continue weekly maintenance dose. INR is therapeutic.   Recheck in 2 weeks.           Clinical Outcomes     Negatives:  Major bleeding event, Thromboembolic event, Anticoagulation-related hospital admission, Anticoagulation-related ED visit, Anticoagulation-related fatality    Comments:  Left DVM.   Patient will continue weekly maintenance dose. INR is therapeutic.   Recheck in 2 weeks.              OBJECTIVE    Recent labs: (last 7 days)     21  1200   INR 2.66*       ASSESSMENT / PLAN  INR assessment THER    Recheck INR In: 2 WEEKS    INR Location Outside lab      Anticoagulation Summary  As of 2021    INR goal:  2.0-3.0   TTR:  80.3 % (11.1 mo)   INR used for dosin.66 (2021)   Warfarin maintenance plan:  7.5 mg (5 mg x 1.5) every Tue, Fri; 10 mg (5 mg x 2) all other days   Full warfarin instructions:  7.5 mg every Tue, Fri; 10 mg all other days   Weekly warfarin total:  65 mg   No change documented:  Reina Forrest RN   Plan last modified:  Mireille Torres RN (2020)   Next INR check:  2021   Priority:  Maintenance   Target end date:  Indefinite    Indications    Long term current use of anticoagulant therapy (Resolved) [Z79.01]  History of deep venous thrombosis [Z86.718]  History of stroke symptoms  stroke ruled out on imaging (Resolved) [Z86.73]  Thrombocytopenia (H) [D69.6]             Anticoagulation Episode Summary     INR check location:      Preferred lab:  [facility]    Send INR reminders to:  ANGI HENRY    Comments:  * lab draw due to elevated hematocrit (fingerstick meters don't work). Needs to have adjusted INR if hct over 55%. LEFT LE. STENT x 3 LEFT UPPER LEG. Previous arterial clot. AS of 10/9/19 Goal range 2.0-3.0. // Patient can be managed  by [facility] clinic      Anticoagulation Care Providers     Provider Role Specialty Phone number    Kd Leong MD Referring Family Practice [phone]            See the Encounter Report to view Anticoagulation Flowsheet and Dosing Calendar (Go to Encounters tab in chart review, and find the Anticoagulation Therapy Visit)        Reina Forrest RN

## 2021-01-19 NOTE — PROGRESS NOTES
"                                           Progress Note  Disclaimer: Voice recognition software was used to generate this note. As a result, wrong word or 'sound-a-like' substitutions may have occurred due to the inherent limitations of voice recognition software. There may be errors in the script that have gone undetected. Please consider this when interpreting information found in this chart.     Patient Name: Bia Bill  Date: 1/19/21         Service Type: Individual      Session Start Time: 11:00AM  session End Time: 111:30AM  Session Length: 30    Session #: 70    Attendees: Client attended alone    Service Modality:  Phone Visit:    The patient has been notified of the following:      \"We have found that certain health care needs can be provided without the need for a face to face visit.  This service lets us provide the care you need with a phone conversation.       I will have full access to your [place] medical record during this entire phone call.   I will be taking notes for your medical record.      Since this is like an office visit, we will bill your insurance company for this service.       There are potential benefits and risks of telephone visits (e.g. limits to patient confidentiality) that differ from in-person visits.?  Confidentiality still applies for telephone services, and nobody will record the visit.  It is important to be in a quiet, private space that is free of distractions (including cell phone or other devices) during the visit.??      If during the course of the call I believe a telephone visit is not appropriate, you will not be charged for this service\"     Consent has been obtained for this service by care team member: Yes      Treatment Plan Last Reviewed: 12/24/2020  PHQ-9 / CHIN-7 : see flowsheets    DATA  Interactive Complexity: No    Crisis: No       Progress Since Last Session (Related to Symptoms / Goals / Homework):   Symptoms: Improving Exercise and medications " helping.    Homework: Achieved / completed to satisfaction      Episode of Care Goals: Satisfactory progress - MAINTENANCE (Working to maintain change, with risk of relapse); Intervened by continuing to positively reinforce healthy behavior choice      Current / Ongoing Stressors and Concerns:   Longtime history of bipolar disorder. Family stress.   Client is recovering from congestive heart failure. Home care and nursing.     Spending time with daughter over holidays.    Blood sugar improved with exercise.     Treatment Objective(s) Addressed in This Session:   identify 2 strategies for more effectively managing Bipolar Disorder    Reinforcing the gains she has made in pulmonary rehab. Blood sugars have improved with more exercise. Has lost around 30#.        Intervention:   CBT: behavioral activation        ASSESSMENT: Current Emotional / Mental Status (status of significant symptoms):   Risk status (Self / Other harm or suicidal ideation)   Patient denies current fears or concerns for personal safety.   Patient denies current or recent suicidal ideation or behaviors.   Patient denies current or recent homicidal ideation or behaviors.   Patient denies current or recent self injurious behavior or ideation.   Patient denies other safety concerns.   Patient reports there has been no change in risk factors since their last session.     Patient reports there has been no change in protective factors since their last session.     Recommended that patient call 911 or go to the local ED should there be a change in any of these risk factors.     Appearance:   Unable to assess    Eye Contact:   Unable to assess    Psychomotor Behavior: Unable to assess    Attitude:   Cooperative    Orientation:   All   Speech    Rate / Production: Normal     Volume:  Normal    Mood:    Normal   Affect:    Appropriate    Thought Content:  Clear    Thought Form:  Coherent  Logical    Insight:    Good      Medication Review:   No changes to  current psychiatric medication(s)               Changes to other medications, see med list in Epic.     Medication Compliance:   Yes     Changes in Health Issues:   None reported     Chemical Use Review:   Substance Use: Chemical use reviewed, no active concerns identified      Tobacco Use: Yes, increase.  Patient reports frequency of use ppd. Provided encouragement to quit     Diagnosis:  1. Moderate mixed bipolar I disorder (H)        Collateral Reports Completed:   Not Applicable    PLAN: (Patient Tasks / Therapist Tasks / Other)  Client to maintain boundaries with family. Make pulmonary rehab her main priority.      Kd CAMMYDominique Morris                                                         ______________________________________________________________________       Treatment Plan     Client's Name: Bia Bill               YOB: 1965     Date: 12/24/2020        DSM5 Diagnoses: (Sustained by DSM5 Criteria Listed Above)  Diagnoses: 296.40 Bipolar I Disorder Current or Most Recent Episode Manic, Unspecified  Psychosocial & Contextual Factors: financial difficulties, chronic pain, roommate issues  WHODAS 2.0 (12 item)               This questionnaire asks about difficulties due to health conditions. Health conditions             include disease or illnesses, other health problems that may be short or long lasting,             injuries, mental health or emotional problems, and problems with alcohol or drugs.                         Think back over the past 30 days and answer these questions, thinking about how much             difficulty you had doing the following activities. For each question, please [place] only             one response.      S1  Standing for long periods such as 30 minutes?  Mild =           2    S2  Taking care of household responsibilities?  Moderate =   3    S3  Learning a new task, for example, learning how to get to a new place?  Mild =           2    S4  How much of a  problem do you have joining community activities (for example, festivals, [employer] or other activities) in the same way as anyone else can?  Moderate =   3    S5  How much have you been emotionally affected by your health problems?  Mild =           2             In the past 30 days, how much difficulty did you have in:    S6  Concentrating on doing something for ten minutes?  Mild =           2    S7  Walking a long distance such as a kilometer (or equivalent)?  Severe =       4    S8  Washing your whole body?  None =         1    S9  Getting dressed?  None =         1    S10  Dealing with people you do not know?  Moderate =   3    S11  Maintaining a friendship?  Severe =       4    S12  Your day to day work?  Moderate =   3       H1  Overall, in the past 30 days, how many days were these difficulties present?  Record number of days seven    H2  In the past 30 days, for how many days were you totally unable to carry out your usual activities or work because of any health condition?  Record number of days  seven    H3  In the past 30 days, not counting the days that you were totally unable, for how many days did you cut back or reduce your usual activities or work because of any health condition?  Record number of days five                      Referral / Collaboration:  Referral to another professional/service is not indicated at this time..     Anticipated number of session or this episode of care: 60     Goals  1. Education- the Biopsychosocial model of depression  a. Client will be able to describe how depression is effecting them physically, emotionally and socially  2. Behavioral Activation  a. Client will learn to assess their depression on a day to day basis  b. Client will Identify two forms of exercise/activity and engage in them 3 times per week  c. Client will Identify 3 things that make them laugh, and engage in these 5 times per week.  d. Client will Identify 1-2Creative activities or hobbies  and  engage in them 2 times per week  e. Client will identify music, movies, books that make them feel good and use them 3-4 times per week  3. Self-care  a. Client will identify 5 things they can do just for themselves  b. Client will take time for quiet, reflection, meditation 5 times per week  c. Client will Learn to set boundaries when appropriate  d. Client will Identify 2 individuals they can call on for support, distraction  4. Assessment of progress  a. Client will engage in assessment of their progress on a regular basis     Bipolar Disorder  Treatment plan:  1. Education- the Biopsychosocial model of Bipolar Disorder    a. Client will be able to describe in general terms what Bipolar Disorder  is and is not  b. Client will be able to describe how Bipolar Disorder  has affected their life in at least two different areas, such as school or work and Home/relationships  c. Clients parents/guardians or significant others will be provided information on what Bipolar Disorder  is and the ways it can affect relationships and be encouraged to be a part of clients treatment team.  2. Medication  a. Client will participate in medication evaluation for Bipolar Disorder  symptoms and follow medication recommendations.   3. Identification and management of triggers  a. Client and therapist will examine patient s history to determine if there are predictable triggers for manic or depressive episodes (e.g. boredom, anger, family stress)  b. Client and therapist will develop means of diffusing these triggers (e.g. relaxation strategies, boundary setting, anger management)  4. Comorbid conditions   a. Client and therapist will asses for comorbid conditions (e.g. anxiety, depression, substance use). and add additional items to treatment plan as needed  5. Self-care  a. Client will identify 3 things they can do just for themselves  b. Client will take time for quiet, reflection, meditation 3 times per week  c. Client will Learn to  set boundaries when appropriate  d. Client will Identify 2 individuals they can call on for support, distraction  5. Behavioral Activation  a. Client will Identify two forms of exercise/activity and engage in them 3 times per week  b. Client will Identify 2 things that make them laugh, and engage in these 3 times per week.  c. Client will Identify 2 Creative activities or hobbies  and engage in them 2 times per week  d. Client will identify music, movies, books that make them feel good and use them 3 times per week  6. Assessment of progress  a. Client will engage in assessment of their progress on a regular basis     Client has reviewed and agreed to the above plan.        Kd Morris             7/24/2020

## 2021-01-21 ENCOUNTER — OFFICE VISIT (OUTPATIENT)
Dept: PODIATRY | Facility: CLINIC | Age: 56
End: 2021-01-21
Attending: FAMILY MEDICINE
Payer: COMMERCIAL

## 2021-01-21 ENCOUNTER — HOSPITAL ENCOUNTER (OUTPATIENT)
Dept: CARDIAC REHAB | Facility: CLINIC | Age: 56
End: 2021-01-21
Attending: FAMILY MEDICINE
Payer: COMMERCIAL

## 2021-01-21 VITALS
BODY MASS INDEX: 45.76 KG/M2 | DIASTOLIC BLOOD PRESSURE: 59 MMHG | SYSTOLIC BLOOD PRESSURE: 119 MMHG | WEIGHT: 218 LBS | HEART RATE: 85 BPM | HEIGHT: 58 IN

## 2021-01-21 DIAGNOSIS — L60.0 INGROWN NAIL OF GREAT TOE OF LEFT FOOT: ICD-10-CM

## 2021-01-21 DIAGNOSIS — L60.0 INGROWN NAIL OF GREAT TOE OF RIGHT FOOT: Primary | ICD-10-CM

## 2021-01-21 PROCEDURE — 11730 AVULSION NAIL PLATE SIMPLE 1: CPT | Mod: TA | Performed by: PODIATRIST

## 2021-01-21 PROCEDURE — 11732 AVLSN NAIL PLATE SIMPLE EACH: CPT | Mod: T5 | Performed by: PODIATRIST

## 2021-01-21 PROCEDURE — 99203 OFFICE O/P NEW LOW 30 MIN: CPT | Mod: 25 | Performed by: PODIATRIST

## 2021-01-21 PROCEDURE — G0238 OTH RESP PROC, INDIV: HCPCS | Performed by: REHABILITATION PRACTITIONER

## 2021-01-21 PROCEDURE — 999N000193 HC STATISTIC VISIT PULM REHAB: Performed by: REHABILITATION PRACTITIONER

## 2021-01-21 ASSESSMENT — MIFFLIN-ST. JEOR: SCORE: 1478.59

## 2021-01-21 NOTE — PROGRESS NOTES
"Bia Bill is a 55 year old female who presents with a chief complain of a painful ingrown toenail to the right and left great toe.  The patient relates pain when wearing shoes.  The patient denies any redness extending up the big toe into the foot.  The patient relates the condition has been getting worse over the past several weeks.         Pertinent medical, surgical and family history include Type II Diabetes Mellitus with peripheral neuropathy, bipolar disorder, personality disorder, lymphedema, chronic gout without tophus, hypothyroidism, vitamin D deficiency, morbid obesity, chronic respiratory failure    Vitals: /59   Pulse 85   Ht 1.473 m (4' 10\")   Wt 98.9 kg (218 lb)   LMP 09/27/2009   BMI 45.56 kg/m    BMI= Body mass index is 45.56 kg/m .    LOWER EXTREMITY PHYSICAL EXAM    Dermatologic: One notes an inflamed nail border of the right and left great toe.  There is noted erythema and edema located around the labial fold and does not extend past the interphalangeal joint.  There is no apparent purulent drainage noted.  There is pain on palpation to the proximal aspect of the nail border.  Otherwise, the skin is intact to both lower extremities without significant lesions, rash or abrasion.           Vascular: DP & PT pulses are intact & regular on the right and left.   CFT and skin temperature is normal to the right and left lower extremities.     Neurologic: Lower extremity sensation is intact to light touch.  No evidence of weakness in the right and left lower extremities.        Musculoskeletal: Patient is ambulatory without assistive device or brace.  No gross ankle deformity noted.  No foot or ankle joint effusion is noted.         ASSESSMENT / PLAN:     ICD-10-CM    1. Ingrown nail of great toe of right foot  L60.0    2. Ingrown nail of great toe of left foot  L60.0        Plan:  I have explained to Bia about the condition.  The potential causes and nature of an ingrown toenail " were discussed with the patient.  We reviewed the natural history and prognosis of the condition and potential risks if no treatment is provided.  Treatment options discussed included conservative management (oral antibiotics, soaking of foot, adequate width shoes)  as well as surgical management (partial or total nail removal).  The pros and cons of both forms as well as risks and benefits of treatment were reviewed.       At this point, I recommended having the offending nail plates removed from the right and left great toe.  I have explained to the patient all of the possible risks, benefits, alternatives to the procedure.  The patient consented to the proposed procedure.  The right and left hallux was swabbed with alcohol.  Next, approximately 5 cc of 1% lidocaine plain was injected around the right and left hallux.  The right and left hallux was then prepped with Betadine ointment.  A tourniquet was applied to the right and left hallux.  The offending nail plate was avulsed in toto.  The wound was then irrigated and dressed with Triple antibiotic ointment and a compressive dry sterile dressing.  The tourniquet was removed and a prompt hyperemic response was noted.  The patient tolerated the procedure well with no complications.  The patient was given post procedure instructions for the care of the wound.   The patient was instructed to notify the office if any redness extending past the big toe joint or fever with chills are experienced.      There is low risk of morbidity with the procedure.  There was no overlap in work associated with the evaluation/management and the work associated with the procedure.    Bia verbalized agreement with and understanding of the rational for the diagnosis and treatment plan.  All questions were answered to best of my ability and the patient's satisfaction. The patient was advised to contact the clinic with any questions that may arise after the clinic visit.       Disclaimer: This note consists of symbols derived from keyboarding, dictation and/or voice recognition software. As a result, there may be errors in the script that have gone undetected. Please consider this when interpreting information found in this chart.      TEQUILA Rai D.P.M., [email or web address].

## 2021-01-21 NOTE — PATIENT INSTRUCTIONS
Post toenail procedure instructions:    1. Keep bandages on for the rest of the day; take off this evening.  2. Soak the foot and toe in warm water with Epsom's salt or Dreft detergent for 20 min.  3. Clean the toe and the treated area with a soapy wash cloth.  4. Apply a topical antibiotic (Bacitracin, Neosporin or triple antibiotic) to the treated area and cover with a Band-Aid  5. Repeat this morning and night for two weeks, or until the treated are resolves any redness or drainage.  a. Redness and drainage is normal when treated with the Phenol acid.  b. Notify the office if there is any redness extending up the foot or purulent drainage noted.    Any discomfort should be treated with either Ibuprofen (Advil) or Tylenol.  Please follow package instructions on amount to be taken.      [employer] [facility]                    Josefina Dunlap RN                                [phone]                                                Serving [place] and [facility]  In Home Foot Care    [facility] Bailey Rudd -[phone] [email or web address]  Pilar/ Marvin/ Marine on [facility]     [facility] Eileen Mcgarry RN, CN, [facility] [phone] [email or web address] [place]:  [facility], [facility]     [facility] Amada Hull RN, CWOCN, [facility] [phone] [email or web address]  Clinic: [facility]     Home visits: [facility]     [facility] Sudha MELENDREZ, RN, [facility] [phone] [email or web address] Clinic:  [facility]  Home visits:  [facility]      [facility] Keeley Marrero RN, [facility] [phone] [email or web address] [facility]     [facility] Elham Alexander RN ,BSN,[facility] [phone]    [email or web address] [place], [facility], [place], [place], [facility], [facility]  Crow Betancourt   [phone]  [email or web address]  Clinic: [facility]  Home visits: [facility]     SMOKING CESSATION  What's in cigarette smoke? - Cigarette smoke contains over 4,000 chemicals. Nicotine is one of the main ingredients which is an insecticide/herbicide. It is poisonous to our nervous system, increases blood clotting risk, and decreases the body's defenses to fight off infection. Another chemical is Carbon Monoxide is an asphyxiating gas that permanently binds to blood cells and blocks the transport of oxygen. This leads to tissue death and decreases your metabolism. Tar is a chemical that coats your lungs and trachea which impairs new oxygen coming in and carbon dioxide getting out of your body.   How does smoking impact surgery? - Smoking is particularly hazardous with regards to surgery. Surgery puts stress on the body and a smoker's body is already under strain from these chemicals. Putting the two together, especially for an elective surgery, could be a recipe for disaster. Smoking before and after surgery increases your risk of heart problems, slow wound healing, delayed bone healing, blood clots, wound infection and anesthesia complications.   What are the benefits of quitting? - In 20 minutes your blood pressure will drop back down to normal. In 8 hours the carbon monoxide (a toxic gas) levels in your blood stream will drop by half, and oxygen levels will return to normal. In 48 hours your chance of having a heart attack will have decreased. All nicotine will have left your body. Your sense of taste and smell will return to a normal level. In 72 hours your bronchial tubes will relax, and your energy levels will increase. In 2 weeks your circulation will increase, and it will continue to improve for the next 10 weeks.    Recommendations for elective surgery - Ideally, patients should quit smoking 8 weeks before and at least 2 weeks after elective surgery in  order to avoid complications. Simply cutting back on the amount of cigarettes smoked per day does not offer any benefit or decrease the risk of poor wound healing, heart problems, and infection. Smokers should also start taking Vitamin C and B for two weeks before surgery and two weeks after surgery.    Ways to Stop Smokin. Nicotine patches, lozenges, or gum  2. Support Groups  3. Medications (see below)    List of Medications:  1. Varenicline Tartrate (CHANTIX)   2. Bupropion HCL (WELLBUTRIN, ZYBAN) - note: make sure Wellbutrin is for smoking cessation and not other issues   3. Nicotine Patch (NICODERM)   4. Nicotine Inhaler (NICOTROL)   5. Nicotine Gum Nicotine Polacrilex   6. Nicotine Lozenge: Nicotine Polacrilex (COMMIT)   * [facility] offers a smoking support group as well!  Please visit: [email or web address]  If you are interested in these, ask about getting a prescription or talk to your primary care doctor about what may be the best way for you to quit.

## 2021-01-21 NOTE — LETTER
"    1/21/2021         RE: Bia Bill  [address] [address] [place] [id number]        Dear Colleague,    Thank you for referring your patient, Bia Bill, to the [facility]. Please see a copy of my visit note below.    Bia Bill is a 55 year old female who presents with a chief complain of a painful ingrown toenail to the right and left great toe.  The patient relates pain when wearing shoes.  The patient denies any redness extending up the big toe into the foot.  The patient relates the condition has been getting worse over the past several weeks.         Pertinent medical, surgical and family history include Type II Diabetes Mellitus with peripheral neuropathy, bipolar disorder, personality disorder, lymphedema, chronic gout without tophus, hypothyroidism, vitamin D deficiency, morbid obesity, chronic respiratory failure    Vitals: /59   Pulse 85   Ht 1.473 m (4' 10\")   Wt 98.9 kg (218 lb)   LMP 09/27/2009   BMI 45.56 kg/m    BMI= Body mass index is 45.56 kg/m .    LOWER EXTREMITY PHYSICAL EXAM    Dermatologic: One notes an inflamed nail border of the right and left great toe.  There is noted erythema and edema located around the labial fold and does not extend past the interphalangeal joint.  There is no apparent purulent drainage noted.  There is pain on palpation to the proximal aspect of the nail border.  Otherwise, the skin is intact to both lower extremities without significant lesions, rash or abrasion.           Vascular: DP & PT pulses are intact & regular on the right and left.   CFT and skin temperature is normal to the right and left lower extremities.     Neurologic: Lower extremity sensation is intact to light touch.  No evidence of weakness in the right and left lower extremities.        Musculoskeletal: Patient is ambulatory without assistive device or brace.  No gross ankle deformity noted.  No foot or ankle joint effusion is " noted.         ASSESSMENT / PLAN:     ICD-10-CM    1. Ingrown nail of great toe of right foot  L60.0    2. Ingrown nail of great toe of left foot  L60.0        Plan:  I have explained to Bia about the condition.  The potential causes and nature of an ingrown toenail were discussed with the patient.  We reviewed the natural history and prognosis of the condition and potential risks if no treatment is provided.  Treatment options discussed included conservative management (oral antibiotics, soaking of foot, adequate width shoes)  as well as surgical management (partial or total nail removal).  The pros and cons of both forms as well as risks and benefits of treatment were reviewed.       At this point, I recommended having the offending nail plates removed from the right and left great toe.  I have explained to the patient all of the possible risks, benefits, alternatives to the procedure.  The patient consented to the proposed procedure.  The right and left hallux was swabbed with alcohol.  Next, approximately 5 cc of 1% lidocaine plain was injected around the right and left hallux.  The right and left hallux was then prepped with Betadine ointment.  A tourniquet was applied to the right and left hallux.  The offending nail plate was avulsed in toto.  The wound was then irrigated and dressed with Triple antibiotic ointment and a compressive dry sterile dressing.  The tourniquet was removed and a prompt hyperemic response was noted.  The patient tolerated the procedure well with no complications.  The patient was given post procedure instructions for the care of the wound.   The patient was instructed to notify the office if any redness extending past the big toe joint or fever with chills are experienced.      There is low risk of morbidity with the procedure.  There was no overlap in work associated with the evaluation/management and the work associated with the procedure.    Bia verbalized agreement  with and understanding of the rational for the diagnosis and treatment plan.  All questions were answered to best of my ability and the patient's satisfaction. The patient was advised to contact the clinic with any questions that may arise after the clinic visit.      Disclaimer: This note consists of symbols derived from keyboarding, dictation and/or voice recognition software. As a result, there may be errors in the script that have gone undetected. Please consider this when interpreting information found in this chart.      AISHWARYA Jaramillo.P.M., F.A.C.F.A.S.        Again, thank you for allowing me to participate in the care of your patient.        Sincerely,        Kd Rai DPM

## 2021-01-21 NOTE — NURSING NOTE
"Chief Complaint   Patient presents with     Consult     nail trim     Ingrown Toenail     BL great toenails       Initial /59   Pulse 85   Ht 1.473 m (4' 10\")   Wt 98.9 kg (218 lb)   LMP 09/27/2009   BMI 45.56 kg/m   Estimated body mass index is 45.56 kg/m  as calculated from the following:    Height as of this encounter: 1.473 m (4' 10\").    Weight as of this encounter: 98.9 kg (218 lb).  Medications and allergies reviewed.      Eula NEFF MA    "

## 2021-01-25 DIAGNOSIS — F33.1 MAJOR DEPRESSIVE DISORDER, RECURRENT EPISODE, MODERATE (H): ICD-10-CM

## 2021-01-25 RX ORDER — TRAZODONE HYDROCHLORIDE 150 MG/1
TABLET ORAL
Qty: 90 TABLET | Refills: 1 | Status: SHIPPED | OUTPATIENT
Start: 2021-01-25

## 2021-01-25 NOTE — PROGRESS NOTES
Outpatient Physical Therapy Progress Note     Patient: Bia Bill  : 1965    Beginning/End Dates of Reporting Period:  2020 to 2021    Referring Provider: Dr. Dang MD    Therapy Diagnosis: BLE secondary lymphedema, chronic      Client Self Report: I've been here all day for various appts, I think my legs are a little swollen    Objective Measurements:   Objective Measure: BLE girth  Details: RLE -10.4% and LLE -12.1% (from 20 to 21)        Outcome Measures (most recent score):   LLIA = 20 on eval; will again complete at discharge     Goals:  Goal Identifier stg   Goal Description pt to have around the clock tolerance to BLE GCB for edema reduction response   Target Date 21   Date Met  (goal discontinued - used Spandagrip only for reductions)   Progress:     Goal Identifier ltg   Goal Description once appropriate, pt to be independent with donning, doffing and care of compression garments for longterm edema management for maintenance   Target Date 21   Date Met      Progress:     Goal Identifier ltg   Goal Description pt to be independent with longterm edema management for maintenance via HEP, elevation, skin cares and compression garment wear/use   Target Date 21   Date Met      Progress:     Goal Identifier ltg   Goal Description pt to have at least 3-5 point improvement on LLIS due to decreased edema in BLEs   Target Date 21   Date Met      Progress:     Progress Toward Goals:   Patient made great reductions in BLEs from date of eval (see above) and was already fit/measured for new compression garments which were vended to patient at last session on 21 and at that time due to patient already having these style of garments in the past, was instructed to return 1x the following week to ensure new garments are adequately containing edema for maintenance.      Plan:  Continue therapy per current plan of care.    Discharge:  No

## 2021-01-26 ENCOUNTER — HOSPITAL ENCOUNTER (OUTPATIENT)
Dept: CARDIAC REHAB | Facility: CLINIC | Age: 56
End: 2021-01-26
Attending: FAMILY MEDICINE
Payer: COMMERCIAL

## 2021-01-26 ENCOUNTER — HOSPITAL ENCOUNTER (OUTPATIENT)
Dept: PHYSICAL THERAPY | Facility: CLINIC | Age: 56
Setting detail: THERAPIES SERIES
End: 2021-01-26
Attending: FAMILY MEDICINE
Payer: COMMERCIAL

## 2021-01-26 PROCEDURE — 97140 MANUAL THERAPY 1/> REGIONS: CPT | Mod: GP | Performed by: PHYSICAL THERAPIST

## 2021-01-26 PROCEDURE — G0239 OTH RESP PROC, GROUP: HCPCS

## 2021-01-26 PROCEDURE — 999N000193 HC STATISTIC VISIT PULM REHAB

## 2021-01-28 ENCOUNTER — HOSPITAL ENCOUNTER (OUTPATIENT)
Dept: CARDIAC REHAB | Facility: CLINIC | Age: 56
End: 2021-01-28
Attending: FAMILY MEDICINE
Payer: COMMERCIAL

## 2021-01-28 PROCEDURE — 999N000193 HC STATISTIC VISIT PULM REHAB

## 2021-01-28 PROCEDURE — G0239 OTH RESP PROC, GROUP: HCPCS

## 2021-02-02 ENCOUNTER — ANTICOAGULATION THERAPY VISIT (OUTPATIENT)
Dept: ANTICOAGULATION | Facility: CLINIC | Age: 56
End: 2021-02-02

## 2021-02-02 ENCOUNTER — HOSPITAL ENCOUNTER (OUTPATIENT)
Dept: CARDIAC REHAB | Facility: CLINIC | Age: 56
End: 2021-02-02
Attending: FAMILY MEDICINE
Payer: COMMERCIAL

## 2021-02-02 DIAGNOSIS — O22.30 DEEP PHLEBOTHROMBOSIS, ANTEPARTUM, WITH DELIVERY (H): ICD-10-CM

## 2021-02-02 DIAGNOSIS — Z86.718 HISTORY OF DEEP VENOUS THROMBOSIS: ICD-10-CM

## 2021-02-02 DIAGNOSIS — D69.6 THROMBOCYTOPENIA (H): ICD-10-CM

## 2021-02-02 DIAGNOSIS — E11.9 TYPE 2 DIABETES MELLITUS WITHOUT COMPLICATION, WITHOUT LONG-TERM CURRENT USE OF INSULIN (H): Chronic | ICD-10-CM

## 2021-02-02 DIAGNOSIS — I82.409 DEEP PHLEBOTHROMBOSIS, ANTEPARTUM, WITH DELIVERY (H): ICD-10-CM

## 2021-02-02 DIAGNOSIS — Z79.01 LONG TERM (CURRENT) USE OF ANTICOAGULANTS: ICD-10-CM

## 2021-02-02 LAB
HBA1C MFR BLD: 6.5 % (ref 0–5.6)
HCT VFR BLD AUTO: 45.8 % (ref 35–47)
INR PPP: 2.99 (ref 0.86–1.14)

## 2021-02-02 PROCEDURE — 36415 COLL VENOUS BLD VENIPUNCTURE: CPT | Performed by: FAMILY MEDICINE

## 2021-02-02 PROCEDURE — 85014 HEMATOCRIT: CPT | Performed by: FAMILY MEDICINE

## 2021-02-02 PROCEDURE — 83036 HEMOGLOBIN GLYCOSYLATED A1C: CPT | Performed by: FAMILY MEDICINE

## 2021-02-02 PROCEDURE — G0239 OTH RESP PROC, GROUP: HCPCS

## 2021-02-02 PROCEDURE — 999N000193 HC STATISTIC VISIT PULM REHAB

## 2021-02-02 PROCEDURE — 85610 PROTHROMBIN TIME: CPT | Performed by: FAMILY MEDICINE

## 2021-02-02 NOTE — PROGRESS NOTES
ANTICOAGULATION MANAGEMENT     Patient Name:  Bia Bill  Date:  2021    ASSESSMENT /SUBJECTIVE:    Today's INR result of 2.99 is therapeutic. Goal INR of 2.0-3.0      Warfarin dose taken: Warfarin taken as instructed    Diet: No new diet changes affecting INR    Medication changes/ interactions: No new medications/supplements affecting INR    Previous INR: Therapeutic 2.66    S/S of bleeding or thromboembolism: No    New injury or illness: No    Upcoming surgery, procedure or cardioversion: No    Additional findings: None      PLAN:    Telephone call with Bia regarding INR result and instructed:     Warfarin Dosing Instructions: Continue your current warfarin dose 7.5mg Tues/Fri; 10mg all other days    Instructed patient to follow up no later than: 2 weeks  Lab visit scheduled    Education provided: Importance of notifying clinic for changes in medications or health; a sooner lab recheck maybe needed       Norah verbalizes understanding and agrees to warfarin dosing plan.    Instructed to call the Anticoagulation Clinic for any changes, questions or concerns. (#[phone])        Angelina Nj RN CACP       OBJECTIVE:  Recent labs: (last 7 days)     21  0913   INR 2.99*         INR assessment THER    Recheck INR In: 2 WEEKS    INR Location Clinic      Anticoagulation Summary  As of 2021    INR goal:  2.0-3.0   TTR:  82.0 % (11.1 mo)   INR used for dosin.99 (2021)   Warfarin maintenance plan:  7.5 mg (5 mg x 1.5) every Tue, Fri; 10 mg (5 mg x 2) all other days   Full warfarin instructions:  7.5 mg every Tue, Fri; 10 mg all other days   Weekly warfarin total:  65 mg   No change documented:  Angelina Nj RN   Plan last modified:  Mireille Torres RN (2020)   Next INR check:  2021   Priority:  Maintenance   Target end date:  Indefinite    Indications    Long term current use of anticoagulant therapy (Resolved) [Z79.01]  History of deep venous thrombosis  [Z86.718]  History of stroke symptoms  stroke ruled out on imaging (Resolved) [Z86.73]  Thrombocytopenia (H) [D69.6]             Anticoagulation Episode Summary     INR check location:      Preferred lab:  [facility]    Send INR reminders to:  ANGI HENRY    Comments:  * lab draw due to elevated hematocrit (fingerstick meters don't work). Needs to have adjusted INR if hct over 55%. LEFT LE. STENT x 3 LEFT UPPER LEG. Previous arterial clot. AS of 10/9/19 Goal range 2.0-3.0. // Patient can be managed by [facility]      Anticoagulation Care Providers     Provider Role Specialty Phone number    Kd Leong MD Referring Family Practice [phone]

## 2021-02-03 ENCOUNTER — TRANSFERRED RECORDS (OUTPATIENT)
Dept: HEALTH INFORMATION MANAGEMENT | Facility: CLINIC | Age: 56
End: 2021-02-03

## 2021-02-03 ENCOUNTER — PATIENT OUTREACH (OUTPATIENT)
Dept: ONCOLOGY | Facility: CLINIC | Age: 56
End: 2021-02-03

## 2021-02-03 DIAGNOSIS — D75.1 ERYTHROCYTOSIS: Primary | ICD-10-CM

## 2021-02-04 ENCOUNTER — HOSPITAL ENCOUNTER (OUTPATIENT)
Dept: CARDIAC REHAB | Facility: CLINIC | Age: 56
End: 2021-02-04
Attending: FAMILY MEDICINE
Payer: COMMERCIAL

## 2021-02-04 DIAGNOSIS — I89.0 LYMPHEDEMA OF BOTH LOWER EXTREMITIES: ICD-10-CM

## 2021-02-04 PROCEDURE — G0239 OTH RESP PROC, GROUP: HCPCS

## 2021-02-04 RX ORDER — FUROSEMIDE 40 MG
TABLET ORAL
Qty: 180 TABLET | Refills: 4 | COMMUNITY
Start: 2021-02-04 | End: 2021-02-12

## 2021-02-04 NOTE — PROGRESS NOTES
I got a call from pulmonary rehab,  Ann is up 7lbs, has more edema oin legs and mild sob.      I talked to Norah.  She was able to talk in complete sentences     Will increase lasix to 60mg in am 40 q noon and 40 at supper time     She was on 60 am and 40 at noon.

## 2021-02-08 ENCOUNTER — INFUSION THERAPY VISIT (OUTPATIENT)
Dept: INFUSION THERAPY | Facility: CLINIC | Age: 56
End: 2021-02-08
Attending: INTERNAL MEDICINE
Payer: COMMERCIAL

## 2021-02-08 ENCOUNTER — HOSPITAL ENCOUNTER (OUTPATIENT)
Dept: LAB | Facility: CLINIC | Age: 56
Discharge: HOME | End: 2021-02-08
Attending: INTERNAL MEDICINE | Admitting: INTERNAL MEDICINE
Payer: COMMERCIAL

## 2021-02-08 ENCOUNTER — ANTICOAGULATION THERAPY VISIT (OUTPATIENT)
Dept: ANTICOAGULATION | Facility: CLINIC | Age: 56
End: 2021-02-08

## 2021-02-08 VITALS — DIASTOLIC BLOOD PRESSURE: 53 MMHG | HEART RATE: 77 BPM | SYSTOLIC BLOOD PRESSURE: 126 MMHG

## 2021-02-08 DIAGNOSIS — D75.1 SECONDARY POLYCYTHEMIA: ICD-10-CM

## 2021-02-08 DIAGNOSIS — D75.1 ERYTHROCYTOSIS: Primary | ICD-10-CM

## 2021-02-08 DIAGNOSIS — D69.6 THROMBOCYTOPENIA (H): ICD-10-CM

## 2021-02-08 DIAGNOSIS — K21.9 GASTROESOPHAGEAL REFLUX DISEASE WITHOUT ESOPHAGITIS: ICD-10-CM

## 2021-02-08 DIAGNOSIS — Z86.718 HISTORY OF DEEP VENOUS THROMBOSIS: ICD-10-CM

## 2021-02-08 LAB
BASOPHILS # BLD AUTO: 0 10E9/L (ref 0–0.2)
BASOPHILS NFR BLD AUTO: 0.3 %
DIFFERENTIAL METHOD BLD: ABNORMAL
EOSINOPHIL # BLD AUTO: 0.1 10E9/L (ref 0–0.7)
EOSINOPHIL NFR BLD AUTO: 1.5 %
ERYTHROCYTE [DISTWIDTH] IN BLOOD BY AUTOMATED COUNT: 19 % (ref 10–15)
HCT VFR BLD AUTO: 46.8 % (ref 35–47)
HGB BLD-MCNC: 13.3 G/DL (ref 11.7–15.7)
IMM GRANULOCYTES # BLD: 0 10E9/L (ref 0–0.4)
IMM GRANULOCYTES NFR BLD: 0.3 %
INR PPP: 2.76 (ref 0.86–1.14)
LYMPHOCYTES # BLD AUTO: 1.4 10E9/L (ref 0.8–5.3)
LYMPHOCYTES NFR BLD AUTO: 22.8 %
MCH RBC QN AUTO: 23.5 PG (ref 26.5–33)
MCHC RBC AUTO-ENTMCNC: 28.4 G/DL (ref 31.5–36.5)
MCV RBC AUTO: 83 FL (ref 78–100)
MONOCYTES # BLD AUTO: 0.5 10E9/L (ref 0–1.3)
MONOCYTES NFR BLD AUTO: 7.5 %
NEUTROPHILS # BLD AUTO: 4.2 10E9/L (ref 1.6–8.3)
NEUTROPHILS NFR BLD AUTO: 67.6 %
NRBC # BLD AUTO: 0 10*3/UL
NRBC BLD AUTO-RTO: 0 /100
PLATELET # BLD AUTO: 147 10E9/L (ref 150–450)
RBC # BLD AUTO: 5.66 10E12/L (ref 3.8–5.2)
WBC # BLD AUTO: 6.2 10E9/L (ref 4–11)

## 2021-02-08 PROCEDURE — 36415 COLL VENOUS BLD VENIPUNCTURE: CPT | Performed by: INTERNAL MEDICINE

## 2021-02-08 PROCEDURE — 99195 PHLEBOTOMY: CPT

## 2021-02-08 PROCEDURE — 85610 PROTHROMBIN TIME: CPT | Performed by: INTERNAL MEDICINE

## 2021-02-08 PROCEDURE — 85025 COMPLETE CBC W/AUTO DIFF WBC: CPT | Performed by: INTERNAL MEDICINE

## 2021-02-08 NOTE — PROGRESS NOTES
Anticoagulation Management    Unable to reach [facility] today.    Today's INR result of 2.76 is therapeutic (goal INR of 2.0-3.0).  Result received from: Clinic Lab    Follow up required to confirm warfarin dose taken and assess for changes    Attempted to call patient x2, left non-detailed voicemails for patient to call [facility] back when the message is received.    Tentative plan: Continue 7.5mg Tues/Fri; 10mg all other days. Recheck the INR again in 1-2 weeks.    Patient has an appointment already set up for next Tuesday. Can consider cancelling this appointment and moving to 2 weeks out.    For callbacks transfer to [phone] until 5 PM.      Anticoagulation clinic to follow up    Angelina Nj RN CACP

## 2021-02-08 NOTE — PROGRESS NOTES
Infusion Nursing Note:  Bia Bill presents today for therapeutic phlebotomy.    Patient seen by provider today: No   present during visit today: Not Applicable.    Note: 500 ml blood removed from pt's left AC as ordered, without difficulty. Pt tolerated well. She requested 400 ml soda to drink following procedure.    Intravenous Access:  Phlebotomy set used..    Treatment Conditions:  Results reviewed, labs MET treatment parameters, ok to proceed with treatment.      Post Infusion Assessment:  Patient tolerated procedure without incident.  Site patent and intact, free from redness, edema or discomfort.  No evidence of extravasations.  Access discontinued per protocol.       Discharge Plan:   Patient discharged in stable condition accompanied by: self.  Departure Mode: Ambulatory.    Samira Mckinnon RN

## 2021-02-09 ENCOUNTER — HOSPITAL ENCOUNTER (OUTPATIENT)
Dept: CARDIAC REHAB | Facility: CLINIC | Age: 56
End: 2021-02-09
Attending: FAMILY MEDICINE
Payer: COMMERCIAL

## 2021-02-09 PROCEDURE — G0239 OTH RESP PROC, GROUP: HCPCS

## 2021-02-09 NOTE — PROGRESS NOTES
ANTICOAGULATION MANAGEMENT     Patient Name:  Bia Bill  Date:  2021    ASSESSMENT /SUBJECTIVE:    Yesterday's INR result of 2.76 is therapeutic. Goal INR of 2.0-3.0      Warfarin dose taken: Warfarin taken as instructed    Diet: No new diet changes affecting INR    Medication changes/ interactions: No new medications/supplements affecting INR    Previous INR: Therapeutic     S/S of bleeding or thromboembolism: No    New injury or illness: No    Upcoming surgery, procedure or cardioversion: No    Additional findings: None      PLAN:    Telephone call with Bia regarding INR result and instructed:     Warfarin Dosing Instructions: Continue your current warfarin dose 7.5 mg every Tue, Fri; 10 mg all other days    Instructed patient to follow up no later than: 2 weeks  Lab visit scheduled    Education provided: Target INR goal and significance of current INR result      Norah verbalizes understanding and agrees to warfarin dosing plan.    Instructed to call the Anticoagulation Clinic for any changes, questions or concerns. (#[phone])        Janine Velasquez RN      OBJECTIVE:  Recent labs: (last 7 days)     21  1257   INR 2.76*         No question data found.  Anticoagulation Summary  As of 2021    INR goal:  2.0-3.0   TTR:  82.5 % (11.1 mo)   INR used for dosin.76 (2021)   Warfarin maintenance plan:  7.5 mg (5 mg x 1.5) every Tue, Fri; 10 mg (5 mg x 2) all other days   Full warfarin instructions:  7.5 mg every Tue, Fri; 10 mg all other days   Weekly warfarin total:  65 mg   No change documented:  Angelina Nj RN   Plan last modified:  Mireille Torres RN (2020)   Next INR check:  2021   Priority:  Maintenance   Target end date:  Indefinite    Indications    Long term current use of anticoagulant therapy (Resolved) [Z79.01]  History of deep venous thrombosis [Z86.718]  History of stroke symptoms  stroke ruled out on imaging (Resolved)  [Z86.73]  Thrombocytopenia (H) [D69.6]             Anticoagulation Episode Summary     INR check location:      Preferred lab:  [facility]    Send INR reminders to:  ANGI HENRY    Comments:  * lab draw due to elevated hematocrit (fingerstick meters don't work). Needs to have adjusted INR if hct over 55%. LEFT LE. STENT x 3 LEFT UPPER LEG. Previous arterial clot. AS of 10/9/19 Goal range 2.0-3.0. // Patient can be managed by [facility] clinic      Anticoagulation Care Providers     Provider Role Specialty Phone number    Kd Leong MD Referring Family Practice [phone]

## 2021-02-11 ENCOUNTER — HOSPITAL ENCOUNTER (OUTPATIENT)
Dept: CARDIAC REHAB | Facility: CLINIC | Age: 56
End: 2021-02-11
Attending: FAMILY MEDICINE
Payer: COMMERCIAL

## 2021-02-11 PROCEDURE — G0239 OTH RESP PROC, GROUP: HCPCS

## 2021-02-12 ENCOUNTER — TELEPHONE (OUTPATIENT)
Dept: FAMILY MEDICINE | Facility: CLINIC | Age: 56
End: 2021-02-12

## 2021-02-12 ENCOUNTER — PATIENT OUTREACH (OUTPATIENT)
Dept: NURSING | Facility: CLINIC | Age: 56
End: 2021-02-12
Payer: COMMERCIAL

## 2021-02-12 DIAGNOSIS — I89.0 LYMPHEDEMA OF BOTH LOWER EXTREMITIES: ICD-10-CM

## 2021-02-12 RX ORDER — FUROSEMIDE 40 MG
TABLET ORAL
Qty: 180 TABLET | Refills: 4 | Status: SHIPPED | OUTPATIENT
Start: 2021-02-12

## 2021-02-12 NOTE — TELEPHONE ENCOUNTER
Reason for Call:  Other medication    Detailed comments: Norah is wondering if you want her to continue to take lasix 3 times daily, morning, noon, night?  If so she'd need you to send in script for her.  I see one in her chart but listed as historical. [facility].   Please review and advise. Thank you..Lizzette Mcintosh    Phone Number Patient can be reached at: Home number on file [phone] (home)    Best Time: any time    Can we leave a detailed message on this number? YES    Call taken on 2/12/2021 at 10:04 AM by Lizzette Mcintosh

## 2021-02-12 NOTE — LETTER
[facility] CARE COORDINATION  **  February 12, 2021    Bia Bill  [address] [address] [place] [id number]      Dear Bia,    I have been attempting to reach you since our last contact. I would like to continue to work with you and provide any additional support you may need on achieving your health care related goals. I would appreciate if you would give me a call at  to let me know if you would like to continue working together. I know that there are many things that can affect our ability to communicate and I hope we can continue to work together.    All of us at the [facility] are invested in your health and are here to assist you in meeting your goals.     Sincerely,    Michela Whitaker RN

## 2021-02-12 NOTE — PROGRESS NOTES
"Clinic Care Coordination Contact    Follow Up Progress Note      Assessment: Outreach to patient.    Patient has been participating in Cardiac/pulmonary rehab. She states she has sore muscles from exercising. She also fell on the ice earlier this week and has a sore hip. She states she is able to walk fine just bruised.    Patient has not wheezing, no shortness of breath during our conversation. She denies shortness of breath. She states she is using her inhalers and doing her nebs as instructed.  Patient has always had audible shortness of breath or wheezing in the past.     Patient states she is currently taking Lasix three times a day. She wonders how long she is supposed to continue this. Per chart review; per rehab conversation with PCP on 2/11/21 she is to continue for another week. If she has no shortness of breath even with exertion she can drop to twice a day  Patient indicates she will continue the three times a day. She states she has the "[employer] scale that reports her weight. She states she was 207 pounds this morning. She has been faithful about weighing daily. Patient weight 246 pounds in November 2020. She states she had no idea how much fluid she had.     Patient states she had to start back on Metformin twice a day. She is checking blood sugars twice a day and they are averaging around 150 - 160.     Patient states she is using her Bipap nightly. Since having this she is not waking or needing to get up in the night. She states she feels so much better. She wakes up earlier and is ready to get up and going now. She states \"I like it.\"     Patient states her daughter who lives in [place], would like her to move closer to her. Patient states she wants to stay where she is. She has her doctors and friends all here. She states she has her puppy and her daughter would make the dog stay in a kennel all the time. She states visiting on the weekend every now and then is enough. She is happy where she is. "     Patient states she had a furnace repair man come to check and clean her furnace. She states her told her it would cost $2000. She does not have than kind of money. Provided her with some resources for low income Furnace  Repair or replacement. Patient states it is working, warms but not very effectively.    Goals addressed this encounter: Improving on all goals  Goals Addressed                 This Visit's Progress      #1 (pt-stated)   60%     Goal Statement: I want my shortness of breath to improve    Date goal set: 11/1/19    Measure of Success: When I am not short of breath    Barriers: COPD    Strengths: Motivated    Date to Achieve By: May 2021    Patient expressed understanding of goal: Yes    Action steps to achieve this goal    1. I will use nebulizer as instructed  2. I will see PCP  as scheduled  3. I will use my Bipap at night        #3 Improve chronic symptoms (pt-stated)   50%     Goal Statement: I want my edema to improve.  Date goal set: 12/5/2019   Measure of Success: I will be able to put support stockings on by myself.  Barriers: lymphedema  Strengths: care coordination, lymphedema clinic  Date to Achieve By: May 2021  Patient expressed understanding of goal: yes    Action steps to achieve this goal  1. I will elevate my legs throughout the day  2. I will attend lymphedema clinic appointments  3. I will take my diuretics as prescribed         #4 Monitoring (pt-stated)   60%     Goal Statement: I will weigh myself every day.  Date goal set: 12/5/2019   Measure of Success: Consistent record of daily weights.  Barriers: Does not like scale  Strengths: care coordination support  Date to Achieve By: May 2021  Patient expressed understanding of goal: yes    Action steps to achieve this goal  1. I will weigh myself every morning on [employer] scale.  2. I will write my weight down in a notebook.  3. Weight gain will be reported via [employer] program        Intervention/Education provided during  outreach: As above     Outreach Frequency: monthly    Plan:   Patient will continue with cardiac rehab.    Care Coordinator will follow up in one month    Michela Whitaker RN, [facility] - Primary Care Clinic RN Coordinator  [facility]   2/12/2021    2:47 PM  [phone]

## 2021-02-12 NOTE — TELEPHONE ENCOUNTER
I called her and left a message make an appointment early next week.  It depends on her daily weights.  I also want to check her potasium level.

## 2021-02-12 NOTE — LETTER
[facility]  Complex Care Plan  About Me:    Patient Name:  Bia Bill    YOB: 1965  Age:         55 year old   [facility] MRN:    [id number] Telephone Information:  Home Phone [phone]   Mobile [phone]       Address:  [address] [address] [place] [id number] Email address:  No e-mail address on record      Emergency Contact(s)    Name Relationship Lgl Grd Work Phone Home Phone Mobile Phone   1. GREGORY CARLSON Significant ot*  none [phone]           Primary language:  English     needed? No   [facility] [employer] [facility]:  [phone] op. 1  Other communication barriers: None  Preferred Method of Communication:  Mail  Current living arrangement:  I live in private home  Mobility Status/ Medical Equipment:  Walker     Health Maintenance  Health Maintenance Reviewed:    Health Maintenance Due   Topic Date Due     HF ACTION PLAN  1965     DIABETIC FOOT EXAM  1965     HEPATITIS C SCREENING  12/03/1984     HEPATITIS B IMMUNIZATION (1 of 3 - Risk 3-dose series) 12/03/1985     ZOSTER IMMUNIZATION (1 of 2) 12/03/2016     MAMMO SCREENING  03/24/2018     PREVENTIVE CARE VISIT  01/30/2021       My Access Plan  Medical Emergency 911   Primary Clinic Line [facility] - [phone]   24 Hour Appointment Line [phone] or  [phone]) (toll-free)   24 Hour Nurse Line [phone] (toll-free)   Preferred Urgent Care     Preferred Hospital     Preferred Pharmacy [place], [place] - [address] [facility]     Behavioral Health Crisis Line The National Suicide Prevention Lifeline at 1-610.515.7000 or 911             My Care Team Members  Patient Care Team       Relationship Specialty Notifications Start End    Kd Leong MD PCP - General Family Practice  11/18/20     Phone: [phone] Fax: [phone] [address] [place] [address]    [facility]  Worker   8/19/16     [facility]     Phone: [phone]         Kd Leong MD Assigned PCP   1/1/17     Phone: [phone] Fax: [phone] [address] [facility] [address]    Michela Whitaker, TA[place] Lead Care Coordinator Primary Care - CC Admissions 11/1/19     Phone: [phone]         Kenya Dupont RN Specialty Care Coordinator Hematology & Oncology Admissions 6/4/20     Joshua Watts MD Assigned Cancer Care Provider   10/23/20     Phone: [phone] Fax: [phone]         [address] [place] [address]    Patel Bazzi MD Assigned Sleep Provider   10/23/20     Phone: [phone] Fax: [phone]         [address] [address]    Kd Rai DPM Assigned Musculoskeletal Provider   1/24/21     Phone: [phone] Pager: [phone] Fax: [phone] [address] [facility] [address]            My Care Plans  Self Management and Treatment Plan  Goals and (Comments)  Goals        General    #1 (pt-stated)     Notes - Note edited  2/12/2021  2:22 PM by Michela Whitaker RN    Goal Statement: I want my shortness of breath to improve    Date goal set: 11/1/19    Measure of Success: When I am not short of breath    Barriers: COPD    Strengths: Motivated    Date to Achieve By: May 2021    Patient expressed understanding of goal: Yes    Action steps to achieve this goal    1. I will use nebulizer as instructed  2. I will see PCP  as scheduled  3. I will use my Bipap at night      #3 Improve chronic symptoms (pt-stated)     Notes - Note edited  2/12/2021  2:23 PM by Michela Whitaker RN    Goal Statement: I want my edema to improve.  Date goal set: 12/5/2019   Measure of Success: I will be able to put support stockings on by myself.  Barriers: lymphedema  Strengths: care coordination, lymphedema clinic  Date to Achieve By: May 2021  Patient expressed understanding of goal: yes    Action steps to achieve this goal  1. I will elevate my  legs throughout the day  2. I will attend lymphedema clinic appointments  3. I will take my diuretics as prescribed       #4 Monitoring (pt-stated)     Notes - Note edited  2/12/2021  2:25 PM by Michela Whitaker RN    Goal Statement: I will weigh myself every day.  Date goal set: 12/5/2019   Measure of Success: Consistent record of daily weights.  Barriers: Does not like scale  Strengths: care coordination support  Date to Achieve By: May 2021  Patient expressed understanding of goal: yes    Action steps to achieve this goal  1. I will weigh myself every morning on [employer] scale.  2. I will write my weight down in a notebook.  3. Weight gain will be reported via [employer] program             Action Plans on File:   Asthma        Depression          Advance Care Plans/Directives Type:        My Medical and Care Information  Problem List   Patient Active Problem List   Diagnosis     Mild persistent asthma     Chronic leg pain     History of deep venous thrombosis     Alcohol abuse, in remission     Secondary polycythemia     Chondromalacia of patella     Developmental reading disorder     Esophageal reflux     Hypothyroidism     Fatty liver     GILES (Obstructive Sleep Apnea)/ Obesity Hypoventilation     RLS (restless legs syndrome)     Tobacco use disorder     Moderate mixed bipolar I disorder (H)     Personality disorder, depressive     Rosacea     [facility]     DDD (degenerative disc disease), cervical     Benign neoplasm of colon (POLYPOSIS)     Somatization disorder     Vitamin D deficiency     Morbid obesity (H)     Cyst of left ovary     Conductive hearing loss of left ear with restricted hearing of right ear     Sensorineural hearing loss (SNHL) of right ear with restricted hearing of left ear     Chronic gout without tophus     Acute respiratory failure with hypoxia and hypercapnia (H)     Lymphedema of both lower extremities     Type 2 diabetes mellitus (H)     Thrombocytopenia (H)     Acute congestive  heart failure, unspecified heart failure type (H)     Chronic respiratory failure with hypercapnia (H)     Hyperlipidemia     Elevated troponin     Hypoxia     Acute cor pulmonale (H)     CHF (congestive heart failure) (H)     Acute respiratory failure with hypoxia (H)     Pulmonary hypertension (H)     Acute on chronic right-sided heart failure (H)     Near syncope     Exacerbation of asthma, unspecified asthma severity, unspecified whether persistent      Current Medications and Allergies:  See printed Medication Report.    Care Coordination Start Date: 11/1/2019   Frequency of Care Coordination: monthly   Form Last Updated: 02/12/2021

## 2021-02-16 ENCOUNTER — ANTICOAGULATION THERAPY VISIT (OUTPATIENT)
Dept: ANTICOAGULATION | Facility: CLINIC | Age: 56
End: 2021-02-16

## 2021-02-16 ENCOUNTER — HOSPITAL ENCOUNTER (OUTPATIENT)
Dept: CARDIAC REHAB | Facility: CLINIC | Age: 56
End: 2021-02-16
Attending: FAMILY MEDICINE
Payer: COMMERCIAL

## 2021-02-16 DIAGNOSIS — I89.0 LYMPHEDEMA OF BOTH LOWER EXTREMITIES: ICD-10-CM

## 2021-02-16 DIAGNOSIS — Z86.718 HISTORY OF DEEP VENOUS THROMBOSIS: ICD-10-CM

## 2021-02-16 DIAGNOSIS — D75.1 ERYTHROCYTOSIS: ICD-10-CM

## 2021-02-16 DIAGNOSIS — D69.6 THROMBOCYTOPENIA (H): ICD-10-CM

## 2021-02-16 DIAGNOSIS — I82.409 DEEP PHLEBOTHROMBOSIS, ANTEPARTUM, WITH DELIVERY (H): ICD-10-CM

## 2021-02-16 DIAGNOSIS — O22.30 DEEP PHLEBOTHROMBOSIS, ANTEPARTUM, WITH DELIVERY (H): ICD-10-CM

## 2021-02-16 DIAGNOSIS — Z79.01 LONG TERM (CURRENT) USE OF ANTICOAGULANTS: ICD-10-CM

## 2021-02-16 LAB
ANION GAP SERPL CALCULATED.3IONS-SCNC: 8 MMOL/L (ref 3–14)
BASOPHILS # BLD AUTO: 0 10E9/L (ref 0–0.2)
BASOPHILS NFR BLD AUTO: 0.6 %
BUN SERPL-MCNC: 7 MG/DL (ref 7–30)
CALCIUM SERPL-MCNC: 9.1 MG/DL (ref 8.5–10.1)
CHLORIDE SERPL-SCNC: 104 MMOL/L (ref 94–109)
CO2 SERPL-SCNC: 26 MMOL/L (ref 20–32)
CREAT SERPL-MCNC: 0.56 MG/DL (ref 0.52–1.04)
DIFFERENTIAL METHOD BLD: ABNORMAL
EOSINOPHIL # BLD AUTO: 0.1 10E9/L (ref 0–0.7)
EOSINOPHIL NFR BLD AUTO: 2.2 %
ERYTHROCYTE [DISTWIDTH] IN BLOOD BY AUTOMATED COUNT: 19.2 % (ref 10–15)
GFR SERPL CREATININE-BSD FRML MDRD: >90 ML/MIN/{1.73_M2}
GLUCOSE SERPL-MCNC: 97 MG/DL (ref 70–99)
HCT VFR BLD AUTO: 43 % (ref 35–47)
HGB BLD-MCNC: 12.2 G/DL (ref 11.7–15.7)
IMM GRANULOCYTES # BLD: 0 10E9/L (ref 0–0.4)
IMM GRANULOCYTES NFR BLD: 0.4 %
INR PPP: 2.69 (ref 0.86–1.14)
LYMPHOCYTES # BLD AUTO: 1.4 10E9/L (ref 0.8–5.3)
LYMPHOCYTES NFR BLD AUTO: 28 %
MCH RBC QN AUTO: 23.5 PG (ref 26.5–33)
MCHC RBC AUTO-ENTMCNC: 28.4 G/DL (ref 31.5–36.5)
MCV RBC AUTO: 83 FL (ref 78–100)
MONOCYTES # BLD AUTO: 0.3 10E9/L (ref 0–1.3)
MONOCYTES NFR BLD AUTO: 6.7 %
NEUTROPHILS # BLD AUTO: 3.1 10E9/L (ref 1.6–8.3)
NEUTROPHILS NFR BLD AUTO: 62.1 %
NRBC # BLD AUTO: 0 10*3/UL
NRBC BLD AUTO-RTO: 0 /100
PLATELET # BLD AUTO: 127 10E9/L (ref 150–450)
POTASSIUM SERPL-SCNC: 3.4 MMOL/L (ref 3.4–5.3)
RBC # BLD AUTO: 5.2 10E12/L (ref 3.8–5.2)
SODIUM SERPL-SCNC: 138 MMOL/L (ref 133–144)
WBC # BLD AUTO: 5 10E9/L (ref 4–11)

## 2021-02-16 PROCEDURE — 85610 PROTHROMBIN TIME: CPT | Performed by: FAMILY MEDICINE

## 2021-02-16 PROCEDURE — 80048 BASIC METABOLIC PNL TOTAL CA: CPT | Performed by: FAMILY MEDICINE

## 2021-02-16 PROCEDURE — 99207 PR NO CHARGE NURSE ONLY: CPT

## 2021-02-16 PROCEDURE — 36415 COLL VENOUS BLD VENIPUNCTURE: CPT | Performed by: FAMILY MEDICINE

## 2021-02-16 PROCEDURE — G0239 OTH RESP PROC, GROUP: HCPCS

## 2021-02-16 PROCEDURE — 85025 COMPLETE CBC W/AUTO DIFF WBC: CPT | Performed by: INTERNAL MEDICINE

## 2021-02-16 NOTE — PROGRESS NOTES
ANTICOAGULATION FOLLOW-UP CLINIC VISIT    Patient Name:  Bia Bill  Date:  2021  Contact Type:  Telephone    SUBJECTIVE:  Patient Findings     Comments:  No changes in diet, activity level, medications (including over the counter), or health. No missed doses of warfarin. Patient took dosing as prescribed. No signs of clots or bleeding concerns. Patient will continue maintenance warfarin dosing.          Clinical Outcomes     Negatives:  Major bleeding event, Thromboembolic event, Anticoagulation-related hospital admission, Anticoagulation-related ED visit, Anticoagulation-related fatality    Comments:  No changes in diet, activity level, medications (including over the counter), or health. No missed doses of warfarin. Patient took dosing as prescribed. No signs of clots or bleeding concerns. Patient will continue maintenance warfarin dosing.             OBJECTIVE    Recent labs: (last 7 days)     21  1123   INR 2.69*       ASSESSMENT / PLAN  INR assessment THER    Recheck INR In: 2 WEEKS    INR Location Clinic      Anticoagulation Summary  As of 2021    INR goal:  2.0-3.0   TTR:  84.6 % (11.1 mo)   INR used for dosin.69 (2021)   Warfarin maintenance plan:  7.5 mg (5 mg x 1.5) every Tue, Fri; 10 mg (5 mg x 2) all other days   Full warfarin instructions:  7.5 mg every Tue, Fri; 10 mg all other days   Weekly warfarin total:  65 mg   No change documented:  Mireille Torres RN   Plan last modified:  Mireille Torres RN (2020)   Next INR check:  3/2/2021   Priority:  Maintenance   Target end date:  Indefinite    Indications    Long term current use of anticoagulant therapy (Resolved) [Z79.01]  History of deep venous thrombosis [Z86.718]  History of stroke symptoms  stroke ruled out on imaging (Resolved) [Z86.73]  Thrombocytopenia (H) [D69.6]             Anticoagulation Episode Summary     INR check location:      Preferred lab:  [facility]    Send INR reminders to:   ANGI HENRY    Comments:  * lab draw due to elevated hematocrit (fingerstick meters don't work). Needs to have adjusted INR if hct over 55%. LEFT LE. STENT x 3 LEFT UPPER LEG. Previous arterial clot. AS of 10/9/19 Goal range 2.0-3.0. // Patient can be managed by [facility] clinic      Anticoagulation Care Providers     Provider Role Specialty Phone number    Kd Leong MD Referring [facility] [phone]            See the Encounter Report to view Anticoagulation Flowsheet and Dosing Calendar (Go to Encounters tab in chart review, and find the Anticoagulation Therapy Visit)        Mireille Torres RN

## 2021-02-17 ENCOUNTER — VIRTUAL VISIT (OUTPATIENT)
Dept: FAMILY MEDICINE | Facility: CLINIC | Age: 56
End: 2021-02-17
Payer: COMMERCIAL

## 2021-02-17 DIAGNOSIS — F31.62 MODERATE MIXED BIPOLAR I DISORDER (H): Primary | ICD-10-CM

## 2021-02-17 DIAGNOSIS — F34.1 PERSONALITY DISORDER, DEPRESSIVE: ICD-10-CM

## 2021-02-17 PROCEDURE — 99214 OFFICE O/P EST MOD 30 MIN: CPT | Mod: 95 | Performed by: FAMILY MEDICINE

## 2021-02-17 ASSESSMENT — PAIN SCALES - GENERAL: PAINLEVEL: NO PAIN (0)

## 2021-02-17 NOTE — PROGRESS NOTES
Norah is a 55 year old who is being evaluated via a billable telephone visit.      What phone number would you like to be contacted at? [phone]  How would you like to obtain your AVS?    Assessment & Plan     (F31.62) Moderate mixed bipolar I disorder (H)  (primary encounter diagnosis)   yes she has significant mental health issues and hads benifited from het cat helping her wit depression and her cat helping with anxiety.      (F34.1) Personality disorder, depressive  He also has asked for a note so she could have a 2 bedroom apartment due to claustrophobia.  I told here I dind not think that was medically indicated.       1 minutes spent on the date of the encounter doing chart review         Kd Leong MD  [facility]   Norah is a 55 year old who presents for the following health issues  accompanied by her :    HPI       Medication Followup of Lasix    Taking Medication as prescribed: yes    Side Effects:  None    Medication Helping Symptoms:  yes       -She is wanting to also talk about an emotional support animal.     Review of Systems   Constitutional, HEENT, cardiovascular, pulmonary, gi and gu systems are negative, except as otherwise noted.      Objective           Vitals:  No vitals were obtained today due to virtual visit.    Physical Exam   healthy, alert and no distress  PSYCH: Alert and oriented times 3; coherent speech, normal   rate and volume, able to articulate logical thoughts, able   to abstract reason, no tangential thoughts, no hallucinations   or delusions  Her affect is normal  RESP: No cough, no audible wheezing, able to talk in full sentences  Remainder of exam unable to be completed due to telephone visits          Phone call duration: 12 minutes

## 2021-02-18 ENCOUNTER — HOSPITAL ENCOUNTER (OUTPATIENT)
Dept: CARDIAC REHAB | Facility: CLINIC | Age: 56
End: 2021-02-18
Attending: FAMILY MEDICINE
Payer: COMMERCIAL

## 2021-02-18 ENCOUNTER — TELEPHONE (OUTPATIENT)
Dept: FAMILY MEDICINE | Facility: CLINIC | Age: 56
End: 2021-02-18

## 2021-02-18 PROCEDURE — G0239 OTH RESP PROC, GROUP: HCPCS

## 2021-02-18 NOTE — PROGRESS NOTES
Outpatient Physical Therapy Progress Note     Patient: Bia Bill  : 1965    Beginning/End Dates of Reporting Period:  2021 to 2021    Referring Provider: Dr. Dang MD    Therapy Diagnosis: BLE secondary lymphedema, chronic      Client Self Report: the legs are doing well with the swelling, my big toes are sore because I had the toenails removed last Thursday    Objective Measurements:  Objective Measure: girth  Details: since last measured on : RLE -9.4% and LLE -3.4%     Outcome Measures (most recent score):  LLIS = 20 on eval; will again complete at time of discharge     Goals:  Goal Identifier stg   Goal Description pt to have around the clock tolerance to BLE GCB for edema reduction response   Target Date 21   Date Met  (goal discontinued - used [employer] only for reductions)   Progress:     Goal Identifier ltg   Goal Description once appropriate, pt to be independent with donning, doffing and care of compression garments for longterm edema management for maintenance   Target Date 21   Date Met  21   Progress:     Goal Identifier ltg   Goal Description pt to be independent with longterm edema management for maintenance via HEP, elevation, skin cares and compression garment wear/use   Target Date 21   Date Met      Progress:     Goal Identifier ltg   Goal Description pt to have at least 3-5 point improvement on LLIS due to decreased edema in BLEs   Target Date 21   Date Met      Progress:     Progress Toward Goals:   Good reductions in BLE edema and was fit into new garments. Last seen on 21, instructed to return in 2 weeks; pt cancelled and no-showed to a few appts but now schedule for follow-up (for anticipated discharge) on 21.      Plan:  Other: see above - one additional appt anticipated     Discharge:  No

## 2021-02-18 NOTE — TELEPHONE ENCOUNTER
Reason for Call:  Other Letter    Detailed comments: Norah was seen by Dr. Leong on 2/17/21 and letter has been mailed to her home per her instructions.  Thank you..Lizzette Mcintosh    Phone Number Patient can be reached at: Home number on file [phone] (home)    Best Time: any time    Can we leave a detailed message on this number? YES    Call taken on 2/18/2021 at 8:01 AM by Lizzette Mcintosh

## 2021-02-21 DIAGNOSIS — I89.0 LYMPHEDEMA: ICD-10-CM

## 2021-02-21 RX ORDER — POTASSIUM CHLORIDE 750 MG/1
10 TABLET, EXTENDED RELEASE ORAL 3 TIMES DAILY
Qty: 90 TABLET | Refills: 2 | Status: SHIPPED | OUTPATIENT
Start: 2021-02-21

## 2021-02-22 ENCOUNTER — TELEPHONE (OUTPATIENT)
Dept: FAMILY MEDICINE | Facility: CLINIC | Age: 56
End: 2021-02-22

## 2021-02-22 DIAGNOSIS — E11.9 TYPE 2 DIABETES MELLITUS WITHOUT COMPLICATION, WITHOUT LONG-TERM CURRENT USE OF INSULIN (H): ICD-10-CM

## 2021-02-22 RX ORDER — GLUCOSAMINE HCL/CHONDROITIN SU 500-400 MG
CAPSULE ORAL
Qty: 100 EACH | Refills: 3 | Status: SHIPPED | OUTPATIENT
Start: 2021-02-22

## 2021-02-22 NOTE — TELEPHONE ENCOUNTER
Patient wanted Dr Leong to know that she will be dropping off or faxing a form for him to fill out regarding an apartment she is getting. She will now be getting a 2 bedroom rather than a one bedroom and she needs him to state on the form that she needs a two bedroom due to the one bedroom being too small and making her claustrophobic.     Bailey Lozano RN

## 2021-02-23 NOTE — TELEPHONE ENCOUNTER
Oxycodone - apap  mg  Last seen: 3/19/19  Follow up appointment: none  Last filled: 4/2/19  Last labs:     Okay to refill?   Please advise.     Dr. Paredes is out of the office until 5/20   Patient called back and says that on the form for Dr. Leong to state that the 2nd bedroom would be her craft room due to her anxiety.    Goldie Michael,  Station

## 2021-02-25 ENCOUNTER — HOSPITAL ENCOUNTER (OUTPATIENT)
Dept: PHYSICAL THERAPY | Facility: CLINIC | Age: 56
Setting detail: THERAPIES SERIES
End: 2021-02-25
Attending: FAMILY MEDICINE
Payer: COMMERCIAL

## 2021-02-25 ENCOUNTER — ANTICOAGULATION THERAPY VISIT (OUTPATIENT)
Dept: FAMILY MEDICINE | Facility: CLINIC | Age: 56
End: 2021-02-25

## 2021-02-25 ENCOUNTER — HOSPITAL ENCOUNTER (OUTPATIENT)
Dept: CARDIAC REHAB | Facility: CLINIC | Age: 56
End: 2021-02-25
Attending: FAMILY MEDICINE
Payer: COMMERCIAL

## 2021-02-25 DIAGNOSIS — Z86.718 HISTORY OF DEEP VENOUS THROMBOSIS: ICD-10-CM

## 2021-02-25 DIAGNOSIS — G63 POLYNEUROPATHY IN OTHER DISEASES CLASSIFIED ELSEWHERE (H): Chronic | ICD-10-CM

## 2021-02-25 DIAGNOSIS — D69.6 THROMBOCYTOPENIA (H): ICD-10-CM

## 2021-02-25 DIAGNOSIS — I82.409 DEEP PHLEBOTHROMBOSIS, ANTEPARTUM, WITH DELIVERY (H): ICD-10-CM

## 2021-02-25 DIAGNOSIS — Z79.01 LONG TERM (CURRENT) USE OF ANTICOAGULANTS: ICD-10-CM

## 2021-02-25 DIAGNOSIS — O22.30 DEEP PHLEBOTHROMBOSIS, ANTEPARTUM, WITH DELIVERY (H): ICD-10-CM

## 2021-02-25 LAB
HCT VFR BLD AUTO: 44.8 % (ref 35–47)
INR PPP: 2.25 (ref 0.86–1.14)

## 2021-02-25 PROCEDURE — 85014 HEMATOCRIT: CPT | Performed by: FAMILY MEDICINE

## 2021-02-25 PROCEDURE — 85610 PROTHROMBIN TIME: CPT | Performed by: FAMILY MEDICINE

## 2021-02-25 PROCEDURE — G0239 OTH RESP PROC, GROUP: HCPCS

## 2021-02-25 PROCEDURE — 36415 COLL VENOUS BLD VENIPUNCTURE: CPT | Performed by: FAMILY MEDICINE

## 2021-02-25 PROCEDURE — 97140 MANUAL THERAPY 1/> REGIONS: CPT | Mod: GP | Performed by: PHYSICAL THERAPIST

## 2021-02-25 PROCEDURE — 99207 PR NO CHARGE NURSE ONLY: CPT | Performed by: FAMILY MEDICINE

## 2021-02-25 RX ORDER — GABAPENTIN 600 MG/1
TABLET ORAL
Qty: 90 TABLET | Refills: 3 | Status: SHIPPED | OUTPATIENT
Start: 2021-02-25

## 2021-02-25 NOTE — TELEPHONE ENCOUNTER
Routing refill request to provider for review/approval because:  Drug not on the [facility] refill protocol     Dae Torres RN

## 2021-02-25 NOTE — PROGRESS NOTES
Anticoagulation Management    Unable to reach [facility] today.    Today's INR result of 2.25 is therapeutic (goal INR of 2.0-3.0).  Result received from: Clinic Lab    Follow up required to confirm warfarin dose taken and assess for changes    Left VM requesting a call back to review dosing. May tx to [phone].      Anticoagulation clinic to follow up    Reina Forrest RN  ANTICOAGULATION FOLLOW-UP CLINIC VISIT    Patient Name:  Bia Bill  Date:  2021  Contact Type:  Telephone    SUBJECTIVE:  Patient Findings     Comments:  Patient will continue weekly maintenance dose. INR is therapeutic.   Recheck in 2 weeks.   Patient verbalizes understanding and agrees to plan. No further questions or concerns.          Clinical Outcomes     Negatives:  Major bleeding event, Thromboembolic event, Anticoagulation-related hospital admission, Anticoagulation-related ED visit, Anticoagulation-related fatality    Comments:  Patient will continue weekly maintenance dose. INR is therapeutic.   Recheck in 2 weeks.   Patient verbalizes understanding and agrees to plan. No further questions or concerns.             OBJECTIVE    Recent labs: (last 7 days)     21  1109   INR 2.25*       ASSESSMENT / PLAN  INR assessment THER    Recheck INR In: 2 WEEKS    INR Location Outside lab      Anticoagulation Summary  As of 2021    INR goal:  2.0-3.0   TTR:  87.3 % (11.1 mo)   INR used for dosin.25 (2021)   Warfarin maintenance plan:  7.5 mg (5 mg x 1.5) every Tue, Fri; 10 mg (5 mg x 2) all other days   Full warfarin instructions:  7.5 mg every Tue, Fri; 10 mg all other days   Weekly warfarin total:  65 mg   No change documented:  Reina Forrest RN   Plan last modified:  Mireille Torres RN (2020)   Next INR check:  3/9/2021   Priority:  Maintenance   Target end date:  Indefinite    Indications    Long term current use of anticoagulant therapy (Resolved) [Z79.01]  History of deep venous thrombosis  [Z86.718]  History of stroke symptoms  stroke ruled out on imaging (Resolved) [Z86.73]  Thrombocytopenia (H) [D69.6]             Anticoagulation Episode Summary     INR check location:      Preferred lab:  [facility]    Send INR reminders to:  ANGI HENRY    Comments:  * lab draw due to elevated hematocrit (fingerstick meters don't work). Needs to have adjusted INR if hct over 55%. LEFT LE. STENT x 3 LEFT UPPER LEG. Previous arterial clot. AS of 10/9/19 Goal range 2.0-3.0. // Patient can be managed by [facility] clinic      Anticoagulation Care Providers     Provider Role Specialty Phone number    Kd Leong MD Referring [facility] [phone]            See the Encounter Report to view Anticoagulation Flowsheet and Dosing Calendar (Go to Encounters tab in chart review, and find the Anticoagulation Therapy Visit)        Reina Forrest RN

## 2021-02-25 NOTE — PROGRESS NOTES
Outpatient Physical Therapy Discharge Note     Patient: Bia Bill  : 1965    Beginning/End Dates of Reporting Period:  2021 to 2021    Referring Provider: Dr. Dang MD    Therapy Diagnosis: BLE secondary lymphedema with venous component, chronic      Client Self Report: [place]'m moving [place] of [place] in about 2 weeks    Objective Measurements:  Objective Measure: girth  Details: since last seen/measured on 21: RLE -0.5% and LLE -4.5% and since initial evaluation on 2020: RLE -19.0% and LLE -18.4%     Outcome Measures (most recent score):  Lymphedema Life Impact Scale (score range 0-72). A higher score indicates greater impairment.: 3    Goals:  Goal Identifier stg   Goal Description pt to have around the clock tolerance to BLE GCB for edema reduction response   Target Date 21   Date Met  (goal discontinued - used Spandagrip only for reductions)   Progress:     Goal Identifier ltg   Goal Description once appropriate, pt to be independent with donning, doffing and care of compression garments for longterm edema management for maintenance   Target Date 21   Date Met  21   Progress:     Goal Identifier ltg   Goal Description pt to be independent with longterm edema management for maintenance via HEP, elevation, skin cares and compression garment wear/use   Target Date 21   Date Met  21   Progress:     Goal Identifier ltg   Goal Description pt to have at least 3-5 point improvement on LLIS due to decreased edema in BLEs   Target Date 21   Date Met  21   Progress:     Progress Toward Goals:   OP lymphedema goals met      Plan:  Discharge from therapy.    Discharge:    Reason for Discharge: Patient has met all goals.    Equipment Issued: 30-40mmHg knee hi BiaCare Compreflex Light for BLEs x2 pairs    Discharge Plan: Patient to continue home program.

## 2021-03-02 ENCOUNTER — TELEPHONE (OUTPATIENT)
Dept: ONCOLOGY | Facility: CLINIC | Age: 56
End: 2021-03-02

## 2021-03-02 DIAGNOSIS — Z86.718 HISTORY OF RECURRENT DEEP VEIN THROMBOSIS (DVT): ICD-10-CM

## 2021-03-02 RX ORDER — WARFARIN SODIUM 5 MG/1
TABLET ORAL
Qty: 170 TABLET | Refills: 2 | Status: SHIPPED | OUTPATIENT
Start: 2021-03-02

## 2021-03-02 NOTE — TELEPHONE ENCOUNTER
Last Written Prescription Date:  1/3/2021  Last Fill Quantity: 157,  # refills: 1   Last office visit: 2/17/2021 with prescribing provider:  timi   Future Office Visit:

## 2021-03-03 DIAGNOSIS — E03.9 HYPOTHYROIDISM, UNSPECIFIED TYPE: Chronic | ICD-10-CM

## 2021-03-03 DIAGNOSIS — E78.00 HYPERCHOLESTEREMIA: ICD-10-CM

## 2021-03-03 RX ORDER — SIMVASTATIN 20 MG
TABLET ORAL
Qty: 90 TABLET | Refills: 2 | Status: SHIPPED | OUTPATIENT
Start: 2021-03-03 | End: 2022-02-14

## 2021-03-03 RX ORDER — LEVOTHYROXINE SODIUM 175 UG/1
175 TABLET ORAL DAILY
Qty: 90 TABLET | Refills: 3 | Status: SHIPPED | OUTPATIENT
Start: 2021-03-03

## 2021-03-03 NOTE — TELEPHONE ENCOUNTER
"Requested Prescriptions   Pending Prescriptions Disp Refills     simvastatin (ZOCOR) 20 MG tablet 90 tablet 2       Statins Protocol Passed - 3/3/2021  9:30 AM        Passed - LDL on file in past 12 months     Recent Labs   Lab Test 06/22/20  0950   LDL 36             Passed - No abnormal creatine kinase in past 12 months     Recent Labs   Lab Test 11/30/15  1508                   Passed - Recent (12 mo) or future (30 days) visit within the authorizing provider's specialty     Patient has had an office visit with the authorizing provider or a provider within the authorizing providers department within the previous 12 mos or has a future within next 30 days. See \"Patient Info\" tab in inbasket, or \"Choose Columns\" in Meds & Orders section of the refill encounter.              Passed - Medication is active on med list        Passed - Patient is age 18 or older        Passed - No active pregnancy on record        Passed - No positive pregnancy test in past 12 months           levothyroxine (SYNTHROID/LEVOTHROID) 175 MCG tablet 90 tablet 0     Sig: Take 1 tablet (175 mcg) by mouth daily       Thyroid Protocol Failed - 3/3/2021  9:30 AM        Failed - Normal TSH on file in past 12 months     Recent Labs   Lab Test 11/09/20  0523   TSH 5.03*              Passed - Patient is 12 years or older        Passed - Recent (12 mo) or future (30 days) visit within the authorizing provider's specialty     Patient has had an office visit with the authorizing provider or a provider within the authorizing providers department within the previous 12 mos or has a future within next 30 days. See \"Patient Info\" tab in inbasket, or \"Choose Columns\" in Meds & Orders section of the refill encounter.              Passed - Medication is active on med list        Passed - No active pregnancy on record     If patient is pregnant or has had a positive pregnancy test, please check TSH.          Passed - No positive pregnancy test in past 12 " months     If patient is pregnant or has had a positive pregnancy test, please check TSH.

## 2021-03-04 ENCOUNTER — HOSPITAL ENCOUNTER (OUTPATIENT)
Dept: CARDIAC REHAB | Facility: CLINIC | Age: 56
End: 2021-03-04
Attending: FAMILY MEDICINE
Payer: COMMERCIAL

## 2021-03-04 PROCEDURE — G0239 OTH RESP PROC, GROUP: HCPCS

## 2021-03-05 ENCOUNTER — VIRTUAL VISIT (OUTPATIENT)
Dept: ONCOLOGY | Facility: CLINIC | Age: 56
End: 2021-03-05
Attending: INTERNAL MEDICINE
Payer: COMMERCIAL

## 2021-03-05 VITALS — WEIGHT: 198.5 LBS | BODY MASS INDEX: 41.67 KG/M2 | HEIGHT: 58 IN

## 2021-03-05 DIAGNOSIS — E11.9 TYPE 2 DIABETES MELLITUS WITHOUT COMPLICATION, WITHOUT LONG-TERM CURRENT USE OF INSULIN (H): Chronic | ICD-10-CM

## 2021-03-05 DIAGNOSIS — K21.9 GASTROESOPHAGEAL REFLUX DISEASE WITHOUT ESOPHAGITIS: Chronic | ICD-10-CM

## 2021-03-05 DIAGNOSIS — M1A.9XX0 CHRONIC GOUT WITHOUT TOPHUS, UNSPECIFIED CAUSE, UNSPECIFIED SITE: Chronic | ICD-10-CM

## 2021-03-05 DIAGNOSIS — J96.12 CHRONIC RESPIRATORY FAILURE WITH HYPERCAPNIA (H): Chronic | ICD-10-CM

## 2021-03-05 DIAGNOSIS — D75.1 ERYTHROCYTOSIS: Primary | ICD-10-CM

## 2021-03-05 DIAGNOSIS — E03.9 HYPOTHYROIDISM, UNSPECIFIED TYPE: Chronic | ICD-10-CM

## 2021-03-05 PROCEDURE — 99214 OFFICE O/P EST MOD 30 MIN: CPT | Mod: 95 | Performed by: INTERNAL MEDICINE

## 2021-03-05 ASSESSMENT — PAIN SCALES - GENERAL: PAINLEVEL: EXTREME PAIN (8)

## 2021-03-05 ASSESSMENT — MIFFLIN-ST. JEOR: SCORE: 1390.14

## 2021-03-05 NOTE — LETTER
"    3/5/2021         RE: Bia Bill  [id number] [facility] [address]        Dear Colleague,    Thank you for referring your patient, Bia Bill, to the [facility]. Please see a copy of my visit note below.    Oncology Rooming Note-Telephone Visit contact #[phone].    March 5, 2021 11:03 AM   Bia Bill is a 55 year old female who presents for:    Chief Complaint   Patient presents with     Hematology     6 month follow up erythrocytosis/anemia. Review lab results.      Initial Vitals: Ht 1.473 m (4' 10\")   Wt 90 kg (198 lb 8 oz)   LMP 09/27/2009   Breastfeeding No   BMI 41.49 kg/m   Estimated body mass index is 41.49 kg/m  as calculated from the following:    Height as of this encounter: 1.473 m (4' 10\").    Weight as of this encounter: 90 kg (198 lb 8 oz). Body surface area is 1.92 meters squared.  Extreme Pain (8) Comment: Data Unavailable   Patient's last menstrual period was 09/27/2009.  Allergies reviewed: Yes  Medications reviewed: Yes    Medications: Medication refills not needed today.  Pharmacy name entered into [employer]:    [facility], [place] - [address] [id number] - [place], [place] - [address] [facility] [place], [place] - [address] [place] [address] [phone]    Clinical concerns: 6 month follow up erythrocytosis/anemia. Review lab results.       Janice Gonzalez CMA              Hematology/ Oncology Telephone Visit:  Mar 5, 2021    Due to the concerns around COVID-19 and adhering to social distancing we conducted this visit over the telephone.      Reason for Visit:   Chief Complaint   Patient presents with     Hematology     6 month follow up erythrocytosis/anemia. Review lab results.        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 She was previously seen by Dr. Lomax on 10/09/2011, but has been lost from followup. The patient has had erythrocytosis with a hemoglobin around 20-23 and hemoglobin and hematocrit of 60-65% for most of her life. She had a previous workup by Dr. Lomax and also by another hematologist at [facility]. Review of her previous tests and workup showed a hemoglobin electrophoresis on 01/17/2007 that showed a hemoglobin A1 of 52%, A2 at 0, hemoglobin F at 1.4% and other hemoglobin 46.2%. JAK2 was negative on 07/31/2007. Hemoglobin affinity testing on 07/31/2007 showed a P50 of 27 (normal between 25 and 29). Repeat hemoglobin electrophoresis on 10/04/2011 showed the hemoglobin was at 39.9%. This illuminated hemoglobin was suggestive of beta globin variant of hemoglobin Hb Hall. She was also seen by Dr. Peres in 2007 and most of the workup is from there. Erythropoietin level was in the range of the 80s. Vitamin B12 and folic acid were within normal limits. Abdomen ultrasound did not show hepatosplenomegaly, but there is fatty infiltration of the liver. According to the chart, the patient had phlebotomies done in the past to keep her hemoglobin below 20 g/dl. This was done over the summertime of 2011. Most recently she didn't have any phlebotomy. She was started on anticoagulation with Coumadin as she was felt to be at high risk for blood clots and given history of severe arterial disease and stroke in the past. The patient is safe to use her anticoagulation and her most recent INR was 1.74. Evidently her carbon monoxide is also elevated. She is an active smoker, but she is cutting down from 2 packs per day to 3 cigarettes a day over the last one month. Patient denies any DVT or PE. She also  evidently claims that her mother and her first son are also having the same hemoglobinopathy and are being followed by their physicians. Bone marrow biopsy on 11/17/2014 showed no clonal chromosomal abnormality and no cytogenetic evidence of a malignant process. She also tested negative for the JAK2 mutation. Leukemia lymphoma evaluation additionally showed no aberrant immunophenotype on T cells and no increase in or abnormal appearing myeloid blasts.       Interval History:  Patient has been feeling well without any recent complaints of shortness of breath or cough or wheezing.  She denies any nausea vomiting or diarrhea.  She denies any recent fever or chills or weight loss.  She quit smoking about 10 years ago.  She continues on phlebotomy to keep hematocrit around 45%.    Review of systems:  Pertinent positives have been included in HPI; remainder of detailed complete 20-point ROS was negative.    Past medical, social, surgical, and family histories reviewed.    Allergies:  Allergies as of 03/05/2021 - Reviewed 03/05/2021   Allergen Reaction Noted     Darvocet [propoxyphene n-apap] Anaphylaxis 07/26/2006     Percocet [oxycodone-acetaminophen] Anaphylaxis, Hives, and Swelling 06/01/2016     Asa [aspirin] Hives 07/10/2006     Bee  03/26/2010     Ibuprofen Swelling 03/30/2018     Lyrica [pregabalin] Dizziness and Swelling 12/04/2015     Povidone iodine Rash 01/25/2017     Tape [adhesive tape] Rash 08/03/2006       Current Medications:  Current Outpatient Medications   Medication Sig Dispense Refill     acetaminophen (TYLENOL) 500 MG tablet Take 500-1,000 mg by mouth every 6 hours as needed for mild pain       ADVAIR -21 MCG/ACT inhaler INHALE TWO PUFFS BY MOUTH TWICE A DAY (Patient taking differently: Inhale 2 puffs into the lungs 2 times daily *Do not use more frequently than twice daily.*  Rinse mouth after use.) 12 g 3     albuterol (PROAIR HFA/PROVENTIL HFA/VENTOLIN HFA) 108 (90 Base) MCG/ACT inhaler  INHALE TWO PUFFS BY MOUTH EVERY 6 HOURS AS NEEDED FOR SHORTNESS OF BREATH DIFFICULTY BREATHING OR WHEEZING (Patient taking differently: Inhale 2 puffs into the lungs every 6 hours as needed for shortness of breath / dyspnea or wheezing ) 18 g 0     albuterol (PROVENTIL) (2.5 MG/3ML) 0.083% neb solution Take 1 vial (2.5 mg) by nebulization every 6 hours as needed for shortness of breath / dyspnea or wheezing 1 Box 0     alcohol swab prep pads Use to swab area of injection/mike as directed. 100 each 3     allopurinol (ZYLOPRIM) 300 MG tablet Take 1 tablet (300 mg) by mouth daily 90 tablet 1     ARIPiprazole (ABILIFY) 10 MG tablet TAKE ONE TABLET BY MOUTH ONCE DAILY (Patient taking differently: Take 10 mg by mouth daily ) 90 tablet 1     blood glucose (NO BRAND SPECIFIED) test strip Use to test blood sugar 2-3x/day. To accompany: Blood Glucose Monitor Brands: per insurance. 100 strip 11     blood glucose calibration (NO BRAND SPECIFIED) solution To accompany: Blood Glucose Monitor Brands: per insurance. 1 Bottle 3     blood glucose monitoring (NO BRAND SPECIFIED) meter device kit Use to test blood sugar daily or as directed. Preferred blood glucose meter OR supplies to accompany: Blood Glucose Monitor Brands: per insurance. 1 kit 0     buPROPion (WELLBUTRIN XL) 150 MG 24 hr tablet TAKE ONE TABLET BY MOUTH EVERY EVENING 90 tablet 3     fluocinonide (LIDEX) 0.05 % external cream Apply sparingly twice daily as needed to legs. 60 g 1     furosemide (LASIX) 40 MG tablet Take 60 mg every morning and 40 mg every afternoon, 40 mg at suppertime. 180 tablet 4     gabapentin (NEURONTIN) 300 MG capsule Take 1 capsule (300 mg) by mouth At Bedtime TAKE ONE CAPSULE BY MOUTH ONCE DAILY AT BEDTIME WITH A 600 MG TABLET FOR A TOTAL DOSE  MG 90 capsule 3     gabapentin (NEURONTIN) 600 MG tablet TAKE ONE TABLET BY MOUTH THREE TIMES A DAY 90 tablet 3     ipratropium - albuterol 0.5 mg/2.5 mg/3 mL (DUONEB) 0.5-2.5 (3) MG/3ML neb  solution INHALE 1 VIAL VIA NEBULIZER EVERY 6 HOURS AS NEEDED FOR FOR SHORTNESS OF BREATH / DYSPNEA OR WHEEZING 90 vial 0     lamoTRIgine (LAMICTAL) 100 MG tablet TAKE ONE TABLET BY MOUTH ONCE DAILY 90 tablet 1     levothyroxine (SYNTHROID/LEVOTHROID) 175 MCG tablet Take 1 tablet (175 mcg) by mouth daily 90 tablet 3     metFORMIN (GLUCOPHAGE-XR) 500 MG 24 hr tablet Take 1 tablet (500 mg) by mouth 2 times daily (with meals) 180 tablet 0     metolazone (ZAROXOLYN) 5 MG tablet Take 1 tablet (5 mg) by mouth See Admin Instructions 30 tablet 1     omeprazole (PRILOSEC) 20 MG DR capsule TAKE ONE CAPSULE BY MOUTH TWICE A  capsule 3     order for DME Equipment being ordered:x2 [employer] 30/40mmHg compression wraps with x2 extra prs of compression liners 1 each 0     order for DME Equipment being ordered: Nebulizer 1 Device 0     order for DME Equipment being ordered: CPAP  AIRSENSE 10  5-18 CM H20  SN# [id number]   DN# [id number]       order for DME Equipment being ordered: DEPENDS SIZE LARGE 60 Month 5     order for DME Equipment being ordered: INCONTINENCE PADS   QID 1 Month 11     potassium chloride ER (KLOR-CON M) 10 MEQ CR tablet Take 1 tablet (10 mEq) by mouth 3 times daily 90 tablet 2     pramipexole (MIRAPEX) 0.125 MG tablet Take 1-20 tablets (0.125-2.5 mg) by mouth At Bedtime TAKE 1-2 TABLETS BY MOUTH AT BEDTIME 180 tablet 1     simvastatin (ZOCOR) 20 MG tablet TAKE ONE TABLET BY MOUTH EVERY NIGHT AT BEDTIME 90 tablet 2     thin (NO BRAND SPECIFIED) lancets Use with lanceting device. To accompany: Blood Glucose Monitor Brands: per insurance. 1 each 6     traZODone (DESYREL) 150 MG tablet TAKE ONE TABLET BY MOUTH EVERY NIGHT AT BEDTIME 90 tablet 1     VITAMIN D3 25 MCG (1000 UT) tablet TAKE ONE TABLET BY MOUTH ONCE DAILY (Patient taking differently: Take 1 tablet by mouth every evening ) 100 tablet 2     warfarin ANTICOAGULANT (JANTOVEN ANTICOAGULANT) 5 MG tablet 7.5 mg (5 mg x 1.5) every Tue, Fri; 10 mg (5 mg x 2) all  other days or as directed by the anticoagulation clinic. 170 tablet 2     EPINEPHrine (ANY BX GENERIC EQUIV) 0.3 MG/0.3ML injection 2-pack Inject 0.3 mLs (0.3 mg) into the muscle once as needed for anaphylaxis (Patient not taking: Reported on 3/5/2021) 0.6 mL 0     nitroGLYcerin (NITROSTAT) 0.4 MG sublingual tablet For chest pain place 1 tablet under the tongue every 5 minutes for 3 doses. If symptoms persist 5 minutes after 1st dose call 911. (Patient not taking: Reported on 2/17/2021) 15 tablet 0        Laboratory/Imaging Studies:  Orders Only on 02/25/2021   Component Date Value Ref Range Status     Hematocrit 02/25/2021 44.8  35.0 - 47.0 % Final     INR 02/25/2021 2.25* 0.86 - 1.14 Final          Assessment and plan:    (D75.1) Erythrocytosis  (primary encounter diagnosis)  We will continue to monitor hemoglobin monthly and offer phlebotomy to keep hematocrit around 45%.    (E03.9) Hypothyroidism, unspecified type  Patient currently on Synthroid 175 mcg orally daily.    (K21.9) Gastroesophageal reflux disease without esophagitis  Patient currently on omeprazole 20 mg orally daily.    (J96.12) Chronic respiratory failure with hypercapnia (H)  Symptoms are currently well controlled with his diet nebulizer treatments she is also on Advair inhaler.    (E11.9) Type 2 diabetes mellitus without complication, without long-term current use of insulin (H)  Patient currently metformin 50 mg twice daily.    (M1A.9XX0) Chronic gout without tophus, unspecified cause, unspecified site  Patient currently on allopurinol 300 mg orally daily.    The patient is ready to learn, no apparent learning barriers were identified.  Diagnosis and treatment plans were explained to the patient. The patient expressed understanding of the content. The patient asked appropriate questions. The patient questions were answered to her satisfaction.    Telephone call lasted 30 minutes.    Joshua Watts MD    Chart documentation with Dragon Voice  recognition Software. Although reviewed after completion, some words and grammatical errors may remain.                                                      Again, thank you for allowing me to participate in the care of your patient.        Sincerely,        Joshua Watts MD

## 2021-03-05 NOTE — LETTER
"    3/5/2021         RE: Bia Bill  [address] [facility] [address]        Dear Colleague,    Thank you for referring your patient, Bia Bill, to the [facility]. Please see a copy of my visit note below.    Oncology Rooming Note-Telephone Visit contact #[phone].    March 5, 2021 11:03 AM   Bia Bill is a 55 year old female who presents for:    Chief Complaint   Patient presents with     Hematology     6 month follow up erythrocytosis/anemia. Review lab results.      Initial Vitals: Ht 1.473 m (4' 10\")   Wt 90 kg (198 lb 8 oz)   LMP 09/27/2009   Breastfeeding No   BMI 41.49 kg/m   Estimated body mass index is 41.49 kg/m  as calculated from the following:    Height as of this encounter: 1.473 m (4' 10\").    Weight as of this encounter: 90 kg (198 lb 8 oz). Body surface area is 1.92 meters squared.  Extreme Pain (8) Comment: Data Unavailable   Patient's last menstrual period was 09/27/2009.  Allergies reviewed: Yes  Medications reviewed: Yes    Medications: Medication refills not needed today.  Pharmacy name entered into [employer]:    [facility], [place] - [address] [id number] - [place], [place] - [address] [facility], [place] - [address] [place] [address] [phone]    Clinical concerns: 6 month follow up erythrocytosis/anemia. Review lab results.       Janice Gonzalez CMA              Hematology/ Oncology Telephone Visit:  Mar 5, 2021    Due to the concerns around COVID-19 and adhering to social distancing we conducted this visit over the telephone.      Reason for Visit:   Chief Complaint   Patient presents with     Hematology     6 month follow up erythrocytosis/anemia. Review lab results.        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 She was previously seen by Dr. Lomax on 10/09/2011, but has been lost from followup. The patient has had erythrocytosis with a hemoglobin around 20-23 and hemoglobin and hematocrit of 60-65% for most of her life. She had a previous workup by Dr. Lomax and also by another hematologist at [facility]. Review of her previous tests and workup showed a hemoglobin electrophoresis on 01/17/2007 that showed a hemoglobin A1 of 52%, A2 at 0, hemoglobin F at 1.4% and other hemoglobin 46.2%. JAK2 was negative on 07/31/2007. Hemoglobin affinity testing on 07/31/2007 showed a P50 of 27 (normal between 25 and 29). Repeat hemoglobin electrophoresis on 10/04/2011 showed the hemoglobin was at 39.9%. This illuminated hemoglobin was suggestive of beta globin variant of hemoglobin Hb [place]. She was also seen by Dr. Peres in 2007 and most of the workup is from there. Erythropoietin level was in the range of the 80s. Vitamin B12 and folic acid were within normal limits. Abdomen ultrasound did not show hepatosplenomegaly, but there is fatty infiltration of the liver. According to the chart, the patient had phlebotomies done in the past to keep her hemoglobin below 20 g/dl. This was done over the summertime of 2011. Most recently she didn't have any phlebotomy. She was started on anticoagulation with Coumadin as she was felt to be at high risk for blood clots and given history of severe arterial disease and stroke in the past. The patient is safe to use her anticoagulation and her most recent INR was 1.74. Evidently her carbon monoxide is also elevated. She is an active smoker, but she is cutting down from 2 packs per day to 3 cigarettes a day over the last one month. Patient denies any DVT or PE. She also  evidently claims that her mother and her first son are also having the same hemoglobinopathy and are being followed by their physicians. Bone marrow biopsy on 11/17/2014 showed no clonal chromosomal abnormality and no cytogenetic evidence of a malignant process. She also tested negative for the JAK2 mutation. Leukemia lymphoma evaluation additionally showed no aberrant immunophenotype on T cells and no increase in or abnormal appearing myeloid blasts.       Interval History:  Patient has been feeling well without any recent complaints of shortness of breath or cough or wheezing.  She denies any nausea vomiting or diarrhea.  She denies any recent fever or chills or weight loss.  She quit smoking about 10 years ago.  She continues on phlebotomy to keep hematocrit around 45%.    Review of systems:  Pertinent positives have been included in HPI; remainder of detailed complete 20-point ROS was negative.    Past medical, social, surgical, and family histories reviewed.    Allergies:  Allergies as of 03/05/2021 - Reviewed 03/05/2021   Allergen Reaction Noted     Darvocet [propoxyphene n-apap] Anaphylaxis 07/26/2006     Percocet [oxycodone-acetaminophen] Anaphylaxis, Hives, and Swelling 06/01/2016     Asa [aspirin] Hives 07/10/2006     Bee  03/26/2010     Ibuprofen Swelling 03/30/2018     Lyrica [pregabalin] Dizziness and Swelling 12/04/2015     Povidone iodine Rash 01/25/2017     Tape [adhesive tape] Rash 08/03/2006       Current Medications:  Current Outpatient Medications   Medication Sig Dispense Refill     acetaminophen (TYLENOL) 500 MG tablet Take 500-1,000 mg by mouth every 6 hours as needed for mild pain       ADVAIR -21 MCG/ACT inhaler INHALE TWO PUFFS BY MOUTH TWICE A DAY (Patient taking differently: Inhale 2 puffs into the lungs 2 times daily *Do not use more frequently than twice daily.*  Rinse mouth after use.) 12 g 3     albuterol (PROAIR HFA/PROVENTIL HFA/VENTOLIN HFA) 108 (90 Base) MCG/ACT inhaler  INHALE TWO PUFFS BY MOUTH EVERY 6 HOURS AS NEEDED FOR SHORTNESS OF BREATH DIFFICULTY BREATHING OR WHEEZING (Patient taking differently: Inhale 2 puffs into the lungs every 6 hours as needed for shortness of breath / dyspnea or wheezing ) 18 g 0     albuterol (PROVENTIL) (2.5 MG/3ML) 0.083% neb solution Take 1 vial (2.5 mg) by nebulization every 6 hours as needed for shortness of breath / dyspnea or wheezing 1 Box 0     alcohol swab prep pads Use to swab area of injection/mike as directed. 100 each 3     allopurinol (ZYLOPRIM) 300 MG tablet Take 1 tablet (300 mg) by mouth daily 90 tablet 1     ARIPiprazole (ABILIFY) 10 MG tablet TAKE ONE TABLET BY MOUTH ONCE DAILY (Patient taking differently: Take 10 mg by mouth daily ) 90 tablet 1     blood glucose (NO BRAND SPECIFIED) test strip Use to test blood sugar 2-3x/day. To accompany: Blood Glucose Monitor Brands: per insurance. 100 strip 11     blood glucose calibration (NO BRAND SPECIFIED) solution To accompany: Blood Glucose Monitor Brands: per insurance. 1 Bottle 3     blood glucose monitoring (NO BRAND SPECIFIED) meter device kit Use to test blood sugar daily or as directed. Preferred blood glucose meter OR supplies to accompany: Blood Glucose Monitor Brands: per insurance. 1 kit 0     buPROPion (WELLBUTRIN XL) 150 MG 24 hr tablet TAKE ONE TABLET BY MOUTH EVERY EVENING 90 tablet 3     fluocinonide (LIDEX) 0.05 % external cream Apply sparingly twice daily as needed to legs. 60 g 1     furosemide (LASIX) 40 MG tablet Take 60 mg every morning and 40 mg every afternoon, 40 mg at suppertime. 180 tablet 4     gabapentin (NEURONTIN) 300 MG capsule Take 1 capsule (300 mg) by mouth At Bedtime TAKE ONE CAPSULE BY MOUTH ONCE DAILY AT BEDTIME WITH A 600 MG TABLET FOR A TOTAL DOSE  MG 90 capsule 3     gabapentin (NEURONTIN) 600 MG tablet TAKE ONE TABLET BY MOUTH THREE TIMES A DAY 90 tablet 3     ipratropium - albuterol 0.5 mg/2.5 mg/3 mL (DUONEB) 0.5-2.5 (3) MG/3ML neb  solution INHALE 1 VIAL VIA NEBULIZER EVERY 6 HOURS AS NEEDED FOR FOR SHORTNESS OF BREATH / DYSPNEA OR WHEEZING 90 vial 0     lamoTRIgine (LAMICTAL) 100 MG tablet TAKE ONE TABLET BY MOUTH ONCE DAILY 90 tablet 1     levothyroxine (SYNTHROID/LEVOTHROID) 175 MCG tablet Take 1 tablet (175 mcg) by mouth daily 90 tablet 3     metFORMIN (GLUCOPHAGE-XR) 500 MG 24 hr tablet Take 1 tablet (500 mg) by mouth 2 times daily (with meals) 180 tablet 0     metolazone (ZAROXOLYN) 5 MG tablet Take 1 tablet (5 mg) by mouth See Admin Instructions 30 tablet 1     omeprazole (PRILOSEC) 20 MG DR capsule TAKE ONE CAPSULE BY MOUTH TWICE A  capsule 3     order for DME Equipment being ordered:x2 [employer] 30/40mmHg compression wraps with x2 extra prs of compression liners 1 each 0     order for DME Equipment being ordered: Nebulizer 1 Device 0     order for DME Equipment being ordered: CPAP  AIRSENSE 10  5-18 CM H20  SN# [id number]   DN# [id number]       order for DME Equipment being ordered: DEPENDS SIZE LARGE 60 Month 5     order for DME Equipment being ordered: INCONTINENCE PADS   QID 1 Month 11     potassium chloride ER (KLOR-CON M) 10 MEQ CR tablet Take 1 tablet (10 mEq) by mouth 3 times daily 90 tablet 2     pramipexole (MIRAPEX) 0.125 MG tablet Take 1-20 tablets (0.125-2.5 mg) by mouth At Bedtime TAKE 1-2 TABLETS BY MOUTH AT BEDTIME 180 tablet 1     simvastatin (ZOCOR) 20 MG tablet TAKE ONE TABLET BY MOUTH EVERY NIGHT AT BEDTIME 90 tablet 2     thin (NO BRAND SPECIFIED) lancets Use with lanceting device. To accompany: Blood Glucose Monitor Brands: per insurance. 1 each 6     traZODone (DESYREL) 150 MG tablet TAKE ONE TABLET BY MOUTH EVERY NIGHT AT BEDTIME 90 tablet 1     VITAMIN D3 25 MCG (1000 UT) tablet TAKE ONE TABLET BY MOUTH ONCE DAILY (Patient taking differently: Take 1 tablet by mouth every evening ) 100 tablet 2     warfarin ANTICOAGULANT (JANTOVEN ANTICOAGULANT) 5 MG tablet 7.5 mg (5 mg x 1.5) every Tue, Fri; 10 mg (5 mg x 2) all  other days or as directed by the anticoagulation clinic. 170 tablet 2     EPINEPHrine (ANY BX GENERIC EQUIV) 0.3 MG/0.3ML injection 2-pack Inject 0.3 mLs (0.3 mg) into the muscle once as needed for anaphylaxis (Patient not taking: Reported on 3/5/2021) 0.6 mL 0     nitroGLYcerin (NITROSTAT) 0.4 MG sublingual tablet For chest pain place 1 tablet under the tongue every 5 minutes for 3 doses. If symptoms persist 5 minutes after 1st dose call 911. (Patient not taking: Reported on 2/17/2021) 15 tablet 0        Laboratory/Imaging Studies:  Orders Only on 02/25/2021   Component Date Value Ref Range Status     Hematocrit 02/25/2021 44.8  35.0 - 47.0 % Final     INR 02/25/2021 2.25* 0.86 - 1.14 Final          Assessment and plan:    (D75.1) Erythrocytosis  (primary encounter diagnosis)  We will continue to monitor hemoglobin monthly and offer phlebotomy to keep hematocrit around 45%.    (E03.9) Hypothyroidism, unspecified type  Patient currently on Synthroid 175 mcg orally daily.    (K21.9) Gastroesophageal reflux disease without esophagitis  Patient currently on omeprazole 20 mg orally daily.    (J96.12) Chronic respiratory failure with hypercapnia (H)  Symptoms are currently well controlled with his diet nebulizer treatments she is also on Advair inhaler.    (E11.9) Type 2 diabetes mellitus without complication, without long-term current use of insulin (H)  Patient currently metformin 50 mg twice daily.    (M1A.9XX0) Chronic gout without tophus, unspecified cause, unspecified site  Patient currently on allopurinol 300 mg orally daily.    The patient is ready to learn, no apparent learning barriers were identified.  Diagnosis and treatment plans were explained to the patient. The patient expressed understanding of the content. The patient asked appropriate questions. The patient questions were answered to her satisfaction.    Telephone call lasted 30 minutes.    Joshua Watts MD    Chart documentation with Dragon Voice  recognition Software. Although reviewed after completion, some words and grammatical errors may remain.                                                      Again, thank you for allowing me to participate in the care of your patient.        Sincerely,        Joshua Watts MD

## 2021-03-05 NOTE — PROGRESS NOTES
"Oncology Rooming Note-Telephone Visit contact #[phone].    March 5, 2021 11:03 AM   Bia Bill is a 55 year old female who presents for:    Chief Complaint   Patient presents with     Hematology     6 month follow up erythrocytosis/anemia. Review lab results.      Initial Vitals: Ht 1.473 m (4' 10\")   Wt 90 kg (198 lb 8 oz)   LMP 09/27/2009   Breastfeeding No   BMI 41.49 kg/m   Estimated body mass index is 41.49 kg/m  as calculated from the following:    Height as of this encounter: 1.473 m (4' 10\").    Weight as of this encounter: 90 kg (198 lb 8 oz). Body surface area is 1.92 meters squared.  Extreme Pain (8) Comment: Data Unavailable   Patient's last menstrual period was 09/27/2009.  Allergies reviewed: Yes  Medications reviewed: Yes    Medications: Medication refills not needed today.  Pharmacy name entered into [employer]:    [place], [place] - [address] [id number] [place], [place] - [address] [facility] [place], [place] - [address] [place] [address] PHARMACY [facility] [address]    Clinical concerns: 6 month follow up erythrocytosis/anemia. Review lab results.       Janice Gonzalez [facility]            "

## 2021-03-05 NOTE — ASSESSMENT & PLAN NOTE
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 She was previously seen by Dr. Lomax on 10/09/2011, but has been lost from followup. The patient has had erythrocytosis with a hemoglobin around 20-23 and hemoglobin and hematocrit of 60-65% for most of her life. She had a previous workup by Dr. Lomax and also by another hematologist at [facility]. Review of her previous tests and workup showed a hemoglobin electrophoresis on 01/17/2007 that showed a hemoglobin A1 of 52%, A2 at 0, hemoglobin F at 1.4% and other hemoglobin 46.2%. JAK2 was negative on 07/31/2007. Hemoglobin affinity testing on 07/31/2007 showed a P50 of 27 (normal between 25 and 29). Repeat hemoglobin electrophoresis on 10/04/2011 showed the hemoglobin was at 39.9%. This illuminated hemoglobin was suggestive of beta globin variant of hemoglobin Hb [place]. She was also seen by Dr. Peres in 2007 and most of the workup is from there. Erythropoietin level was in the range of the 80s. Vitamin B12 and folic acid were within normal limits. Abdomen ultrasound did not show hepatosplenomegaly, but there is fatty infiltration of the liver. According to the chart, the patient had phlebotomies done in the past to keep her hemoglobin below 20 g/dl. This was done over the summertime of 2011. Most recently she didn't have any phlebotomy. She was started on anticoagulation with Coumadin as she was felt to be at high risk for blood clots and given history of severe arterial disease and stroke in the past. The patient is safe to use her anticoagulation and her most recent INR was 1.74. Evidently her carbon monoxide is also elevated. She is an active smoker, but she is cutting down from 2 packs per day to  3 cigarettes a day over the last one month. Patient denies any DVT or PE. She also evidently claims that her mother and her first son are also having the same hemoglobinopathy and are being followed by their physicians. Bone marrow biopsy on 11/17/2014 showed no clonal chromosomal abnormality and no cytogenetic evidence of a malignant process. She also tested negative for the JAK2 mutation. Leukemia lymphoma evaluation additionally showed no aberrant immunophenotype on T cells and no increase in or abnormal appearing myeloid blasts.

## 2021-03-05 NOTE — PROGRESS NOTES
Hematology/ Oncology Telephone Visit:  Mar 5, 2021    Due to the concerns around COVID-19 and adhering to social distancing we conducted this visit over the telephone.      Reason for Visit:   Chief Complaint   Patient presents with     Hematology     6 month follow up erythrocytosis/anemia. Review lab results.        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 She was previously seen by Dr. Lomax on 10/09/2011, but has been lost from followup. The patient has had erythrocytosis with a hemoglobin around 20-23 and hemoglobin and hematocrit of 60-65% for most of her life. She had a previous workup by Dr. Lomax and also by another hematologist at [facility]. Review of her previous tests and workup showed a hemoglobin electrophoresis on 01/17/2007 that showed a hemoglobin A1 of 52%, A2 at 0, hemoglobin F at 1.4% and other hemoglobin 46.2%. JAK2 was negative on 07/31/2007. Hemoglobin affinity testing on 07/31/2007 showed a P50 of 27 (normal between 25 and 29). Repeat hemoglobin electrophoresis on 10/04/2011 showed the hemoglobin was at 39.9%. 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 She was also seen by Dr. Peres in 2007 and most of the workup is from there. Erythropoietin level was in the range of the 80s. Vitamin B12 and folic acid were within normal limits. Abdomen ultrasound did not show hepatosplenomegaly, but there is fatty infiltration of the liver. According to the chart, the patient had phlebotomies done in the past to keep her hemoglobin below 20 g/dl. This was done over the summertime of 2011. Most recently she didn't have any phlebotomy. She was  started on anticoagulation with Coumadin as she was felt to be at high risk for blood clots and given history of severe arterial disease and stroke in the past. The patient is safe to use her anticoagulation and her most recent INR was 1.74. Evidently her carbon monoxide is also elevated. She is an active smoker, but she is cutting down from 2 packs per day to 3 cigarettes a day over the last one month. Patient denies any DVT or PE. She also evidently claims that her mother and her first son are also having the same hemoglobinopathy and are being followed by their physicians. Bone marrow biopsy on 11/17/2014 showed no clonal chromosomal abnormality and no cytogenetic evidence of a malignant process. She also tested negative for the JAK2 mutation. Leukemia lymphoma evaluation additionally showed no aberrant immunophenotype on T cells and no increase in or abnormal appearing myeloid blasts.       Interval History:  Patient has been feeling well without any recent complaints of shortness of breath or cough or wheezing.  She denies any nausea vomiting or diarrhea.  She denies any recent fever or chills or weight loss.  She quit smoking about 10 years ago.  She continues on phlebotomy to keep hematocrit around 45%.    Review of systems:  Pertinent positives have been included in HPI; remainder of detailed complete 20-point ROS was negative.    Past medical, social, surgical, and family histories reviewed.    Allergies:  Allergies as of 03/05/2021 - Reviewed 03/05/2021   Allergen Reaction Noted     Darvocet [propoxyphene n-apap] Anaphylaxis 07/26/2006     Percocet [oxycodone-acetaminophen] Anaphylaxis, Hives, and Swelling 06/01/2016     Asa [aspirin] Hives 07/10/2006     Bee  03/26/2010     Ibuprofen Swelling 03/30/2018     Lyrica [pregabalin] Dizziness and Swelling 12/04/2015     Povidone iodine Rash 01/25/2017     Tape [adhesive tape] Rash 08/03/2006       Current Medications:  Current Outpatient Medications    Medication Sig Dispense Refill     acetaminophen (TYLENOL) 500 MG tablet Take 500-1,000 mg by mouth every 6 hours as needed for mild pain       ADVAIR -21 MCG/ACT inhaler INHALE TWO PUFFS BY MOUTH TWICE A DAY (Patient taking differently: Inhale 2 puffs into the lungs 2 times daily *Do not use more frequently than twice daily.*  Rinse mouth after use.) 12 g 3     albuterol (PROAIR HFA/PROVENTIL HFA/VENTOLIN HFA) 108 (90 Base) MCG/ACT inhaler INHALE TWO PUFFS BY MOUTH EVERY 6 HOURS AS NEEDED FOR SHORTNESS OF BREATH DIFFICULTY BREATHING OR WHEEZING (Patient taking differently: Inhale 2 puffs into the lungs every 6 hours as needed for shortness of breath / dyspnea or wheezing ) 18 g 0     albuterol (PROVENTIL) (2.5 MG/3ML) 0.083% neb solution Take 1 vial (2.5 mg) by nebulization every 6 hours as needed for shortness of breath / dyspnea or wheezing 1 Box 0     alcohol swab prep pads Use to swab area of injection/mike as directed. 100 each 3     allopurinol (ZYLOPRIM) 300 MG tablet Take 1 tablet (300 mg) by mouth daily 90 tablet 1     ARIPiprazole (ABILIFY) 10 MG tablet TAKE ONE TABLET BY MOUTH ONCE DAILY (Patient taking differently: Take 10 mg by mouth daily ) 90 tablet 1     blood glucose (NO BRAND SPECIFIED) test strip Use to test blood sugar 2-3x/day. To accompany: Blood Glucose Monitor Brands: per insurance. 100 strip 11     blood glucose calibration (NO BRAND SPECIFIED) solution To accompany: Blood Glucose Monitor Brands: per insurance. 1 Bottle 3     blood glucose monitoring (NO BRAND SPECIFIED) meter device kit Use to test blood sugar daily or as directed. Preferred blood glucose meter OR supplies to accompany: Blood Glucose Monitor Brands: per insurance. 1 kit 0     buPROPion (WELLBUTRIN XL) 150 MG 24 hr tablet TAKE ONE TABLET BY MOUTH EVERY EVENING 90 tablet 3     fluocinonide (LIDEX) 0.05 % external cream Apply sparingly twice daily as needed to legs. 60 g 1     furosemide (LASIX) 40 MG tablet Take 60  mg every morning and 40 mg every afternoon, 40 mg at suppertime. 180 tablet 4     gabapentin (NEURONTIN) 300 MG capsule Take 1 capsule (300 mg) by mouth At Bedtime TAKE ONE CAPSULE BY MOUTH ONCE DAILY AT BEDTIME WITH A 600 MG TABLET FOR A TOTAL DOSE  MG 90 capsule 3     gabapentin (NEURONTIN) 600 MG tablet TAKE ONE TABLET BY MOUTH THREE TIMES A DAY 90 tablet 3     ipratropium - albuterol 0.5 mg/2.5 mg/3 mL (DUONEB) 0.5-2.5 (3) MG/3ML neb solution INHALE 1 VIAL VIA NEBULIZER EVERY 6 HOURS AS NEEDED FOR FOR SHORTNESS OF BREATH / DYSPNEA OR WHEEZING 90 vial 0     lamoTRIgine (LAMICTAL) 100 MG tablet TAKE ONE TABLET BY MOUTH ONCE DAILY 90 tablet 1     levothyroxine (SYNTHROID/LEVOTHROID) 175 MCG tablet Take 1 tablet (175 mcg) by mouth daily 90 tablet 3     metFORMIN (GLUCOPHAGE-XR) 500 MG 24 hr tablet Take 1 tablet (500 mg) by mouth 2 times daily (with meals) 180 tablet 0     metolazone (ZAROXOLYN) 5 MG tablet Take 1 tablet (5 mg) by mouth See Admin Instructions 30 tablet 1     omeprazole (PRILOSEC) 20 MG DR capsule TAKE ONE CAPSULE BY MOUTH TWICE A  capsule 3     order for DME Equipment being ordered:x2 [employer] 30/40mmHg compression wraps with x2 extra prs of compression liners 1 each 0     order for DME Equipment being ordered: Nebulizer 1 Device 0     order for DME Equipment being ordered: CPAP  AIRSENSE 10  5-18 CM H20  SN# [id number]   DN# [id number]       order for DME Equipment being ordered: DEPENDS SIZE LARGE 60 Month 5     order for DME Equipment being ordered: INCONTINENCE PADS   QID 1 Month 11     potassium chloride ER (KLOR-CON M) 10 MEQ CR tablet Take 1 tablet (10 mEq) by mouth 3 times daily 90 tablet 2     pramipexole (MIRAPEX) 0.125 MG tablet Take 1-20 tablets (0.125-2.5 mg) by mouth At Bedtime TAKE 1-2 TABLETS BY MOUTH AT BEDTIME 180 tablet 1     simvastatin (ZOCOR) 20 MG tablet TAKE ONE TABLET BY MOUTH EVERY NIGHT AT BEDTIME 90 tablet 2     thin (NO BRAND SPECIFIED) lancets Use with lanceting  device. To accompany: Blood Glucose Monitor Brands: per insurance. 1 each 6     traZODone (DESYREL) 150 MG tablet TAKE ONE TABLET BY MOUTH EVERY NIGHT AT BEDTIME 90 tablet 1     VITAMIN D3 25 MCG (1000 UT) tablet TAKE ONE TABLET BY MOUTH ONCE DAILY (Patient taking differently: Take 1 tablet by mouth every evening ) 100 tablet 2     warfarin ANTICOAGULANT (JANTOVEN ANTICOAGULANT) 5 MG tablet 7.5 mg (5 mg x 1.5) every Tue, Fri; 10 mg (5 mg x 2) all other days or as directed by the anticoagulation clinic. 170 tablet 2     EPINEPHrine (ANY BX GENERIC EQUIV) 0.3 MG/0.3ML injection 2-pack Inject 0.3 mLs (0.3 mg) into the muscle once as needed for anaphylaxis (Patient not taking: Reported on 3/5/2021) 0.6 mL 0     nitroGLYcerin (NITROSTAT) 0.4 MG sublingual tablet For chest pain place 1 tablet under the tongue every 5 minutes for 3 doses. If symptoms persist 5 minutes after 1st dose call 911. (Patient not taking: Reported on 2/17/2021) 15 tablet 0        Laboratory/Imaging Studies:  Orders Only on 02/25/2021   Component Date Value Ref Range Status     Hematocrit 02/25/2021 44.8  35.0 - 47.0 % Final     INR 02/25/2021 2.25* 0.86 - 1.14 Final          Assessment and plan:    (D75.1) Erythrocytosis  (primary encounter diagnosis)  We will continue to monitor hemoglobin monthly and offer phlebotomy to keep hematocrit around 45%.    (E03.9) Hypothyroidism, unspecified type  Patient currently on Synthroid 175 mcg orally daily.    (K21.9) Gastroesophageal reflux disease without esophagitis  Patient currently on omeprazole 20 mg orally daily.    (J96.12) Chronic respiratory failure with hypercapnia (H)  Symptoms are currently well controlled with his diet nebulizer treatments she is also on Advair inhaler.    (E11.9) Type 2 diabetes mellitus without complication, without long-term current use of insulin (H)  Patient currently metformin 50 mg twice daily.    (M1A.9XX0) Chronic gout without tophus, unspecified cause, unspecified  site  Patient currently on allopurinol 300 mg orally daily.    The patient is ready to learn, no apparent learning barriers were identified.  Diagnosis and treatment plans were explained to the patient. The patient expressed understanding of the content. The patient asked appropriate questions. The patient questions were answered to her satisfaction.    Telephone call lasted 30 minutes.    Joshua Watts MD    Chart documentation with Dragon Voice recognition Software. Although reviewed after completion, some words and grammatical errors may remain.

## 2021-03-09 ENCOUNTER — HOSPITAL ENCOUNTER (OUTPATIENT)
Dept: CARDIAC REHAB | Facility: CLINIC | Age: 56
End: 2021-03-09
Attending: FAMILY MEDICINE
Payer: COMMERCIAL

## 2021-03-09 PROCEDURE — G0238 OTH RESP PROC, INDIV: HCPCS

## 2021-03-11 ENCOUNTER — PATIENT OUTREACH (OUTPATIENT)
Dept: NURSING | Facility: CLINIC | Age: 56
End: 2021-03-11
Payer: COMMERCIAL

## 2021-03-11 ENCOUNTER — TELEPHONE (OUTPATIENT)
Dept: FAMILY MEDICINE | Facility: CLINIC | Age: 56
End: 2021-03-11

## 2021-03-11 NOTE — TELEPHONE ENCOUNTER
Writer returned call to patient. Unable to reach patient; left message to return call to [facility] when able.     Janine Velasquez RN 03/11/21 at 2:01 PM

## 2021-03-11 NOTE — TELEPHONE ENCOUNTER
Patient is moving to [place], [place] tomorrow. She still has not established care, but she has the contact information for the clinic there and will be calling to establish care with a new PCP. Her INR is due April 8th, so she plans to establish care prior to that so she can have her new provider manage her warfarin/dosing prior to that date. Writer explained that [facility] cannot manage her warfarin unless she has a Provider within [facility] that she sees yearly. Since patient is transferring her care outside of [facility], she is aware that [facility] will discharge her.    Since patient has not yet moved/established care will wait to discharge until we can confirm she has actually established care with a new Provider.     Janine Velasquez, RN 03/11/21 at 3:39 PM

## 2021-03-11 NOTE — TELEPHONE ENCOUNTER
Patient left a VM stating she will be moving and has questions regarding INR. Please call back when able.  Thank you.  Zara Danielle RN  Anticoagulation Nurse - [facility], [place]

## 2021-03-11 NOTE — PROGRESS NOTES
Clinic Care Coordination Contact    Follow Up Progress Note      Assessment: Patient calling clinic care coordinator.    Patient states that she is doing well. She is not short of breath, she states the edema in her feet and legs is way down  And she now weighs under 200 pounds. She is doing very well and happy with the improvements she has made in her life.      Patient states she is moving to [place], [place]. This is where her daughter lives. She has been encouraging patient to move there for some time.      Patient states she is moving into a senior housing apartment. She states she filled out the paper work and three days later they called her and told her she could have a two bedroom apartment.  She is pleased with this as she will have a craft room.     Patient states she has been busy packing for the last two weeks and she is moving tomorrow. Her family and friends are helping her with the move.     She states there is a clinic a block from the apartment and a pharmacy a couple of blocks away. She states everything is within a short walk including the grocery store. She states she is looking forward to being able to walk.     Patient states she can take her dog and cat with her.     We discussed at length how to establish care in new [facility].  Makes sure she takes all of her medications to first appointment. Advised patient how to transfer prescriptions from one pharmacy to another.         Advised changing her address to new residence at the clinic. INR will need to be ordered at new [facility].    Goals addressed this encounter:   Goals Addressed                 This Visit's Progress      #1 (pt-stated)   80%     Goal Statement: I want my shortness of breath to improve    Date goal set: 11/1/19    Measure of Success: When I am not short of breath    Barriers: COPD    Strengths: Motivated    Date to Achieve By: May 2021    Patient expressed understanding of goal: Yes    Action steps to achieve this goal    1. I  will use nebulizer as instructed  2. I will see PCP  as scheduled  3. I will use my Bipap at night        #3 Improve chronic symptoms (pt-stated)   70%     Goal Statement: I want my edema to improve.  Date goal set: 12/5/2019   Measure of Success: I will be able to put support stockings on by myself.  Barriers: lymphedema  Strengths: care coordination, lymphedema clinic  Date to Achieve By: May 2021  Patient expressed understanding of goal: yes    Action steps to achieve this goal  1. I will elevate my legs throughout the day  2. I will attend lymphedema clinic appointments  3. I will take my diuretics as prescribed         #4 Monitoring (pt-stated)   90%     Goal Statement: I will weigh myself every day.  Date goal set: 12/5/2019   Measure of Success: Consistent record of daily weights.  Barriers: Does not like scale  Strengths: care coordination support  Date to Achieve By: May 2021  Patient expressed understanding of goal: yes    Action steps to achieve this goal  1. I will weigh myself every morning on Medtronic scale.  2. I will write my weight down in a notebook.  3. Weight gain will be reported via Medtronic program        Intervention/Education provided during outreach: As above     Outreach Frequency: monthly    Plan:   Patient will settle into new housing.     Care Coordinator will follow up in one month to determine status and then will close.     Michela Whitaker RN, [facility] - Primary Care Clinic RN Coordinator  [facility]   3/11/2021    2:32 PM  [phone]

## 2021-03-12 DIAGNOSIS — J44.9 CHRONIC OBSTRUCTIVE PULMONARY DISEASE, UNSPECIFIED COPD TYPE (H): Chronic | ICD-10-CM

## 2021-03-12 RX ORDER — FLUTICASONE PROPIONATE AND SALMETEROL XINAFOATE 230; 21 UG/1; UG/1
AEROSOL, METERED RESPIRATORY (INHALATION)
Qty: 12 G | Refills: 0 | Status: SHIPPED | OUTPATIENT
Start: 2021-03-12 | End: 2021-04-25

## 2021-03-12 NOTE — LETTER
[facility]  [id number]  [facility] [address]  Phone: [phone]    03/12/21    Bia Bill  [address] [address] [place] [id number]      Dear Norah,    At  [facility] we care about your health and are committed to providing quality patient care. It has come to our attention that you are due for an Asthma Control Test.     This screening tool helps us to assess how well your asthma is controlled. Good asthma control leads to less asthma symptoms and greater health. If your asthma is not in good control (score less than 20) it is recommended you be seen by your provider for medication and lifestyle adjustments.    Enclosed is an Asthma Control Test (ACT). When you get a moment, please call the [facility] and asked to speak to the clinic RN or CMA, so the questions can be gone over.       Sincerely,        Brenna FLETCHER RN, BSN for  Kd Leong MD

## 2021-03-12 NOTE — TELEPHONE ENCOUNTER
ACT mailed to pt today, with request to call clinic so it can be completed via phone.    ACT Total Scores 11/20/2019 1/29/2020 1/30/2020   ACT TOTAL SCORE - - -   ASTHMA ER VISITS - - -   ASTHMA HOSPITALIZATIONS - - -   ACT TOTAL SCORE (Goal Greater than or Equal to 20) 15 18 13   In the past 12 months, how many times did you visit the emergency room for your asthma without being admitted to the hospital? 1 0 0   In the past 12 months, how many times were you hospitalized overnight because of your asthma? 1 0 0       Brenna FLETCHER, RN, BSN

## 2021-03-23 ENCOUNTER — PATIENT OUTREACH (OUTPATIENT)
Dept: NURSING | Facility: CLINIC | Age: 56
End: 2021-03-23
Payer: COMMERCIAL

## 2021-03-23 DIAGNOSIS — E11.9 TYPE 2 DIABETES MELLITUS WITHOUT COMPLICATION, WITHOUT LONG-TERM CURRENT USE OF INSULIN (H): ICD-10-CM

## 2021-03-23 LAB
ALBUMIN (URINE) MG/SPEC: 0.6 MG/DL
CREATININE (URINE): 80 MG/DL

## 2021-03-23 RX ORDER — LANCETS
1 EACH MISCELLANEOUS 3 TIMES DAILY
Qty: 200 EACH | Refills: 4 | Status: SHIPPED | OUTPATIENT
Start: 2021-03-23

## 2021-03-23 NOTE — TELEPHONE ENCOUNTER
Bia has moved to [place], [place] and needs scripts to go to [facility].  When looking it appears that scripts were sent to [facility] in February and she has no idea why they would've gone there as she has never lived there or used that particular pharmacy.     She is establishing with a new MD today but is out of Lancets.    Accuchek lancets      Last Written Prescription Date:  unknown  Last Fill Quantity:unknown  # refills: unknown  Last Office Visit:  2/17/21  Future Office visit:       Routing refill request to provider for review/approval because:  Out of lancets    accuchek test strips      Last Written Prescription Date:  2/22/21  Last Fill Quantity: 100,   # refills: 11  Last Office Visit: 2/17/21  Future Office visit:       Routing refill request to provider for review/approval because:  Drug not on the [facility], [facility] or [facility] refill protocol or controlled substance

## 2021-03-23 NOTE — PROGRESS NOTES
"Clinic Care Coordination Contact    Follow Up Progress Note      Assessment: Patient calling clinic care coordinator.    Patient states she has moved to [place]. She has been there a week and 1/2 now. She states she is still unpacking. Patient states that the apartment building has lots of rules about putting things on the walls and you can't hang anything from the ceiling or you have to pay $100 per hole.   She states her rent is only $255 per month.    She does not feel that the neighbors are very friendly. She states she is \"the new kid on the block\"  And they don't know her yet. She states her dog has not adjusted yet either. Her cat is fine, no longer hiding.    Patient states it is very different. She is being told that she can expect to see wolves, coyotes and possibly bears in town.     Patient states her daughter wants to be her PCA. Patient does not feel she needs a PCA. She states she can shower on her own, and can take care of all her needs without assistance. She can now walk to the pharmacy, the doctor's office and the grocery store, so she does not need to worry about transportation all the time.     Patient states she is going to continue to have counseling with Kd Mcneill. They have been having their sessions over the phone for the last year due to covid-19, so there is no reason they can't continue these. She does not want to change.     Patient states she has her first doctor's appointment today.   She has all of her medications ready to take and she states they sent her a release of information form to fill out and return. She is nervous about meeting a new doctor. It is \"hard to start over.\"    Patient states \"her friend\" moved into her old trailer. He was living in his car before. She states he has taken over the rent and utilities so she is no longer responsible for that. She states the trailer is owned by herself and her sisters.            Goals addressed this encounter: Stable  Goals " Addressed                 This Visit's Progress      #1 (pt-stated)   80%     Goal Statement: I want my shortness of breath to improve    Date goal set: 11/1/19    Measure of Success: When I am not short of breath    Barriers: COPD    Strengths: Motivated    Date to Achieve By: May 2021    Patient expressed understanding of goal: Yes    Action steps to achieve this goal    1. I will use nebulizer as instructed  2. I will see PCP  as scheduled  3. I will use my Bipap at night        #3 Improve chronic symptoms (pt-stated)   70%     Goal Statement: I want my edema to improve.  Date goal set: 12/5/2019   Measure of Success: I will be able to put support stockings on by myself.  Barriers: lymphedema  Strengths: care coordination, lymphedema clinic  Date to Achieve By: May 2021  Patient expressed understanding of goal: yes    Action steps to achieve this goal  1. I will elevate my legs throughout the day  2. I will attend lymphedema clinic appointments  3. I will take my diuretics as prescribed         #4 Monitoring (pt-stated)   90%     Goal Statement: I will weigh myself every day.  Date goal set: 12/5/2019   Measure of Success: Consistent record of daily weights.  Barriers: Does not like scale  Strengths: care coordination support  Date to Achieve By: May 2021  Patient expressed understanding of goal: yes    Action steps to achieve this goal  1. I will weigh myself every morning on Medtronic scale.  2. I will write my weight down in a notebook.  3. Weight gain will be reported via [employer] program        Intervention/Education provided during outreach: Listening, encouragement and reassurance that she will adjust to her new living environment with time     Outreach Frequency: monthly    Plan:   Patient will establish care with new doctor.    Care Coordinator will follow up in one month    Michela Whitaker RN, [facility] - Primary [facility] Clinic RN Coordinator  [facility]   3/23/2021    11:09  AM  [phone]

## 2021-04-16 ENCOUNTER — TELEPHONE (OUTPATIENT)
Dept: ANTICOAGULATION | Facility: CLINIC | Age: 56
End: 2021-04-16

## 2021-04-16 DIAGNOSIS — D69.6 THROMBOCYTOPENIA (H): ICD-10-CM

## 2021-04-16 DIAGNOSIS — Z86.718 HISTORY OF DEEP VENOUS THROMBOSIS: ICD-10-CM

## 2021-04-16 NOTE — TELEPHONE ENCOUNTER
ANTICOAGULATION  MANAGEMENT    Bia Bill is being discharged from the [facility] Anticoagulation Management Program ([facility]).    Reason for discharge: care has been transferred to [facility]    Anticoagulation episode resolved, [facility] referral closed and INR Standing order discontinued    If patient needs warfarin management in the future, please send a new referral

## 2021-04-22 DIAGNOSIS — J44.9 CHRONIC OBSTRUCTIVE PULMONARY DISEASE, UNSPECIFIED COPD TYPE (H): Chronic | ICD-10-CM

## 2021-04-22 NOTE — TELEPHONE ENCOUNTER
Routing refill request to provider for review/approval because:  ACT score: 13 on 1/2020    Dae Torres RN

## 2021-04-25 RX ORDER — FLUTICASONE PROPIONATE AND SALMETEROL XINAFOATE 230; 21 UG/1; UG/1
AEROSOL, METERED RESPIRATORY (INHALATION)
Qty: 12 G | Refills: 0 | Status: SHIPPED | OUTPATIENT
Start: 2021-04-25

## 2021-04-28 ENCOUNTER — VIRTUAL VISIT (OUTPATIENT)
Dept: PSYCHOLOGY | Facility: CLINIC | Age: 56
End: 2021-04-28
Payer: COMMERCIAL

## 2021-04-28 DIAGNOSIS — F31.62 MODERATE MIXED BIPOLAR I DISORDER (H): Primary | ICD-10-CM

## 2021-04-28 PROCEDURE — 90834 PSYTX W PT 45 MINUTES: CPT | Mod: 95 | Performed by: PSYCHOLOGIST

## 2021-04-28 NOTE — PROGRESS NOTES
"                                           Progress Note  Disclaimer: Voice recognition software was used to generate this note. As a result, wrong word or 'sound-a-like' substitutions may have occurred due to the inherent limitations of voice recognition software. There may be errors in the script that have gone undetected. Please consider this when interpreting information found in this chart.     Patient Name: Bia Bill  Date: 4/28/21         Service Type: Individual      Session Start Time: 1400  session End Time: 1445  Session Length: 45    Session #: 71    Attendees: Client attended alone    Service Modality:  Phone Visit:    The patient has been notified of the following:      \"We have found that certain health care needs can be provided without the need for a face to face visit.  This service lets us provide the care you need with a phone conversation.       I will have full access to your [place] medical record during this entire phone call.   I will be taking notes for your medical record.      Since this is like an office visit, we will bill your insurance company for this service.       There are potential benefits and risks of telephone visits (e.g. limits to patient confidentiality) that differ from in-person visits.?  Confidentiality still applies for telephone services, and nobody will record the visit.  It is important to be in a quiet, private space that is free of distractions (including cell phone or other devices) during the visit.??      If during the course of the call I believe a telephone visit is not appropriate, you will not be charged for this service\"     Consent has been obtained for this service by care team member: Yes      Treatment Plan Last Reviewed: 12/24/2020  PHQ-9 / CHIN-7 : see flowsheets    DATA  Interactive Complexity: No    Crisis: No       Progress Since Last Session (Related to Symptoms / Goals / Homework):   Symptoms: Improving Exercise and medications " helping.    Homework: Achieved / completed to satisfaction      Episode of Care Goals: Satisfactory progress - MAINTENANCE (Working to maintain change, with risk of relapse); Intervened by continuing to positively reinforce healthy behavior choice      Current / Ongoing Stressors and Concerns:   Had to put cat down. Still has dog. Dog is lonely. Ex BF wants her to come down because he is lonely. He is living in her old trailer.   She gets to see her grandson every day. Daughter is 15mn away.  Bills are much lower. Doing a lot of walking. Seeing PT. Getting echocardiogram, iron and INR are being monitored weekly.   New furniture.  Has cut back on smoking a great deal.   Doing stairs gives her some exercise.      Treatment Objective(s) Addressed in This Session:   identify 2 strategies for more effectively managing Bipolar Disorder    Feeling more relaxed now that she has moved. Less stress.       Intervention:   CBT: behavioral activation        ASSESSMENT: Current Emotional / Mental Status (status of significant symptoms):   Risk status (Self / Other harm or suicidal ideation)   Patient denies current fears or concerns for personal safety.   Patient denies current or recent suicidal ideation or behaviors.   Patient denies current or recent homicidal ideation or behaviors.   Patient denies current or recent self injurious behavior or ideation.   Patient denies other safety concerns.   Patient reports there has been no change in risk factors since their last session.     Patient reports there has been no change in protective factors since their last session.     Recommended that patient call 911 or go to the local ED should there be a change in any of these risk factors.     Appearance:   Unable to assess    Eye Contact:   Unable to assess    Psychomotor Behavior: Unable to assess    Attitude:   Cooperative    Orientation:   All   Speech    Rate / Production: Normal     Volume:  Normal     Mood:    Normal   Affect:    Appropriate    Thought Content:  Clear    Thought Form:  Coherent  Logical    Insight:    Good      Medication Review:   No changes to current psychiatric medication(s)               No changes to psych meds.      Medication Compliance:   Yes     Changes in Health Issues:   None reported     Chemical Use Review:   Substance Use: Chemical use reviewed, no active concerns identified      Tobacco Use: Yes, increase.  Patient reports frequency of use ppd. Provided encouragement to quit     Diagnosis:  1. Moderate mixed bipolar I disorder (H)        Collateral Reports Completed:   Not Applicable    PLAN: (Patient Tasks / Therapist Tasks / Other)  Client to maintain boundaries with family. Make pulmonary rehab her main priority. Enjoy her new life!      Kd Morris                                                         ______________________________________________________________________       Treatment Plan     Client's Name: Bia Bill               YOB: 1965     Date: 12/24/2020        DSM5 Diagnoses: (Sustained by DSM5 Criteria Listed Above)  Diagnoses: 296.40 Bipolar I Disorder Current or Most Recent Episode Manic, Unspecified  Psychosocial & Contextual Factors: financial difficulties, chronic pain, roommate issues  WHODAS 2.0 (12 item)               This questionnaire asks about difficulties due to health conditions. Health conditions             include disease or illnesses, other health problems that may be short or long lasting,             injuries, mental health or emotional problems, and problems with alcohol or drugs.                         Think back over the past 30 days and answer these questions, thinking about how much             difficulty you had doing the following activities. For each question, please [place] only             one response.      S1  Standing for long periods such as 30 minutes?  Mild =           2    S2  Taking care of  household responsibilities?  Moderate =   3    S3  Learning a new task, for example, learning how to get to a new place?  Mild =           2    S4  How much of a problem do you have joining community activities (for example, festivals, Buddhism or other activities) in the same way as anyone else can?  Moderate =   3    S5  How much have you been emotionally affected by your health problems?  Mild =           2             In the past 30 days, how much difficulty did you have in:    S6  Concentrating on doing something for ten minutes?  Mild =           2    S7  Walking a long distance such as a kilometer (or equivalent)?  Severe =       4    S8  Washing your whole body?  None =         1    S9  Getting dressed?  None =         1    S10  Dealing with people you do not know?  Moderate =   3    S11  Maintaining a friendship?  Severe =       4    S12  Your day to day work?  Moderate =   3       H1  Overall, in the past 30 days, how many days were these difficulties present?  Record number of days seven    H2  In the past 30 days, for how many days were you totally unable to carry out your usual activities or work because of any health condition?  Record number of days  seven    H3  In the past 30 days, not counting the days that you were totally unable, for how many days did you cut back or reduce your usual activities or work because of any health condition?  Record number of days five                      Referral / Collaboration:  Referral to another professional/service is not indicated at this time..     Anticipated number of session or this episode of care: 60     Goals  1. Education- the Biopsychosocial model of depression  a. Client will be able to describe how depression is effecting them physically, emotionally and socially  2. Behavioral Activation  a. Client will learn to assess their depression on a day to day basis  b. Client will Identify two forms of exercise/activity and engage in them 3 times per  week  c. Client will Identify 3 things that make them laugh, and engage in these 5 times per week.  d. Client will Identify 1-2Creative activities or hobbies  and engage in them 2 times per week  e. Client will identify music, movies, books that make them feel good and use them 3-4 times per week  3. Self-care  a. Client will identify 5 things they can do just for themselves  b. Client will take time for quiet, reflection, meditation 5 times per week  c. Client will Learn to set boundaries when appropriate  d. Client will Identify 2 individuals they can call on for support, distraction  4. Assessment of progress  a. Client will engage in assessment of their progress on a regular basis     Bipolar Disorder  Treatment plan:  1. Education- the Biopsychosocial model of Bipolar Disorder    a. Client will be able to describe in general terms what Bipolar Disorder  is and is not  b. Client will be able to describe how Bipolar Disorder  has affected their life in at least two different areas, such as school or work and Home/relationships  c. Clients parents/guardians or significant others will be provided information on what Bipolar Disorder  is and the ways it can affect relationships and be encouraged to be a part of clients treatment team.  2. Medication  a. Client will participate in medication evaluation for Bipolar Disorder  symptoms and follow medication recommendations.   3. Identification and management of triggers  a. Client and therapist will examine patient s history to determine if there are predictable triggers for manic or depressive episodes (e.g. boredom, anger, family stress)  b. Client and therapist will develop means of diffusing these triggers (e.g. relaxation strategies, boundary setting, anger management)  4. Comorbid conditions   a. Client and therapist will asses for comorbid conditions (e.g. anxiety, depression, substance use). and add additional items to treatment plan as  needed  5. Self-care  a. Client will identify 3 things they can do just for themselves  b. Client will take time for quiet, reflection, meditation 3 times per week  c. Client will Learn to set boundaries when appropriate  d. Client will Identify 2 individuals they can call on for support, distraction  5. Behavioral Activation  a. Client will Identify two forms of exercise/activity and engage in them 3 times per week  b. Client will Identify 2 things that make them laugh, and engage in these 3 times per week.  c. Client will Identify 2 Creative activities or hobbies  and engage in them 2 times per week  d. Client will identify music, movies, books that make them feel good and use them 3 times per week  6. Assessment of progress  a. Client will engage in assessment of their progress on a regular basis     Client has reviewed and agreed to the above plan.        Kd Morris             7/24/2020

## 2021-05-10 DIAGNOSIS — E11.9 TYPE 2 DIABETES MELLITUS WITHOUT COMPLICATION, WITHOUT LONG-TERM CURRENT USE OF INSULIN (H): ICD-10-CM

## 2021-05-10 RX ORDER — METFORMIN HCL 500 MG
500 TABLET, EXTENDED RELEASE 24 HR ORAL 2 TIMES DAILY WITH MEALS
Qty: 180 TABLET | Refills: 2 | Status: SHIPPED | OUTPATIENT
Start: 2021-05-10 | End: 2022-02-14

## 2021-05-28 NOTE — PROGRESS NOTES
"  ANTICOAGULATION FOLLOW-UP CLINIC VISIT    Patient Name:  Bia Bill  Date:  10/24/2017  Contact Type:  Face to Face    SUBJECTIVE:     Patient Findings     Positives Antibiotic use or infection (cefdinir through 10-29)    Comments Patient hospitalized 10-18 to 10-20 for GI bleed and UTI. Per discharge instructions \"Follow up with primary care provider, Kd Leong, within 7 days for hospital follow- up.  The following labs/tests are recommended: Check INR and CBC   Restart Warfarin in 2-4 weeks as decides by primary care Physician\".    EGD was negative. Patient has follow up with Dr. Leong on 10-24-17 to discuss resuming warfarin.           OBJECTIVE    INR   Date Value Ref Range Status   10/20/2017 1.20 (H) 0.86 - 1.14 Final       ASSESSMENT / PLAN  No question data found.  Anticoagulation Summary as of 10/24/2017     INR goal 2.0-3.0   Today's INR No new INR was available at the time of this encounter.   Maintenance plan 10 mg (5 mg x 2) every day   Full instructions 10/24: Hold; Otherwise 10 mg every day   Weekly total 70 mg   Plan last modified Brenna Rausch, RN (9/29/2017)   Next INR check 10/25/2017   Target end date Indefinite    Indications   Long term current use of anticoagulant therapy [Z79.01]  DVT (deep venous thrombosis) (H) [I82.409]  PVD (peripheral vascular disease) with claudication (H) [I73.9]         Anticoagulation Episode Summary     INR check location     Preferred lab     Send INR reminders to [facility] [facility]    Comments * lab draw due to elevated hematocrit (fingerstick meters don't work). LEFT LE. STENT x 3 LEFT UPPER LEG. Previous arterial clot.      Anticoagulation Care Providers     Provider Role Specialty Phone number    Kd Leong MD [facility] [phone]            See the Encounter Report to view Anticoagulation Flowsheet and Dosing Calendar (Go to Encounters tab in chart review, and find the Anticoagulation Therapy " Visit)        Mireille Torres RN                [Fever] : no fever [Chills] : no chills [Fatigue] : no fatigue [FreeTextEntry2] : 61 o male awake and alert, mild facial swelling

## 2021-06-17 ENCOUNTER — CARE COORDINATION (OUTPATIENT)
Dept: SLEEP MEDICINE | Facility: CLINIC | Age: 56
End: 2021-06-17

## 2021-06-17 NOTE — PROGRESS NOTES
Received faxed request for Continuity of Care, sleep studies, From [facility], STAT request.  Faxed copy of PSG 2010, and Titration study 2020 to Fax:  [phone], [facility]  [phone]

## 2021-06-22 ENCOUNTER — VIRTUAL VISIT (OUTPATIENT)
Dept: PSYCHOLOGY | Facility: CLINIC | Age: 56
End: 2021-06-22
Payer: COMMERCIAL

## 2021-06-22 DIAGNOSIS — F31.62 MODERATE MIXED BIPOLAR I DISORDER (H): Primary | ICD-10-CM

## 2021-06-22 PROCEDURE — 90834 PSYTX W PT 45 MINUTES: CPT | Mod: 95 | Performed by: PSYCHOLOGIST
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
## 2021-07-08 ENCOUNTER — VIRTUAL VISIT (OUTPATIENT)
Dept: PSYCHOLOGY | Facility: CLINIC | Age: 56
End: 2021-07-08
Payer: COMMERCIAL

## 2021-07-08 DIAGNOSIS — F31.62 MODERATE MIXED BIPOLAR I DISORDER (H): Primary | ICD-10-CM

## 2021-07-08 PROCEDURE — 90834 PSYTX W PT 45 MINUTES: CPT | Mod: 95 | Performed by: PSYCHOLOGIST
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
## 2021-07-22 ENCOUNTER — VIRTUAL VISIT (OUTPATIENT)
Dept: PSYCHOLOGY | Facility: CLINIC | Age: 56
End: 2021-07-22
Payer: COMMERCIAL

## 2021-07-22 DIAGNOSIS — F31.62 MODERATE MIXED BIPOLAR I DISORDER (H): Primary | ICD-10-CM

## 2021-07-22 LAB — HBA1C MFR BLD: 5.7 % (ref 4–5.6)

## 2021-07-22 PROCEDURE — 90834 PSYTX W PT 45 MINUTES: CPT | Mod: 95 | Performed by: PSYCHOLOGIST
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
## 2021-07-27 DIAGNOSIS — I89.0 LYMPHEDEMA OF BOTH LOWER EXTREMITIES: ICD-10-CM

## 2021-07-28 RX ORDER — CHOLECALCIFEROL (VITAMIN D3) 25 MCG
TABLET ORAL
Qty: 30 TABLET | Refills: 1 | Status: SHIPPED | OUTPATIENT
Start: 2021-07-28 | End: 2021-09-16

## 2021-07-28 NOTE — TELEPHONE ENCOUNTER
Routing refill request to provider for review/approval because:  Patient needs to be seen because:  Last seen by Dr. Leong for a VV in January   PCP to determine refill    Dae Torres RN

## 2021-08-05 ENCOUNTER — VIRTUAL VISIT (OUTPATIENT)
Dept: PSYCHOLOGY | Facility: CLINIC | Age: 56
End: 2021-08-05
Payer: COMMERCIAL

## 2021-08-05 DIAGNOSIS — F31.62 MODERATE MIXED BIPOLAR I DISORDER (H): Primary | ICD-10-CM

## 2021-08-05 PROCEDURE — 90834 PSYTX W PT 45 MINUTES: CPT | Mod: 95 | Performed by: PSYCHOLOGIST
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
## 2021-08-10 LAB — TSH SERPL-ACNC: 4.3 UIU/ML (ref 0.4–3.99)

## 2021-08-11 ENCOUNTER — TRANSFERRED RECORDS (OUTPATIENT)
Dept: HEALTH INFORMATION MANAGEMENT | Facility: CLINIC | Age: 56
End: 2021-08-11

## 2021-08-11 LAB
ALT SERPL-CCNC: 16 IU/L (ref 6–31)
AST SERPL-CCNC: 14 IU/L (ref 10–40)
CHOLESTEROL (EXTERNAL): 129 MG/DL (ref 114–200)
CREATININE (EXTERNAL): 1.06 MG/DL (ref 0.4–1)
GFR ESTIMATED (EXTERNAL): 54 ML/MIN/1.73M2
GLUCOSE (EXTERNAL): 103 MG/DL (ref 70–99)
HDLC SERPL-MCNC: 29 MG/DL (ref 40–60)
LDL CHOLESTEROL (EXTERNAL): 65 MG/DL
POTASSIUM (EXTERNAL): 3.9 MEQ/L (ref 3.4–5.1)
TRIGLYCERIDES (EXTERNAL): 175 MG/DL (ref 10–200)

## 2021-08-24 ENCOUNTER — TELEPHONE (OUTPATIENT)
Dept: FAMILY MEDICINE | Facility: CLINIC | Age: 56
End: 2021-08-24

## 2021-08-24 NOTE — TELEPHONE ENCOUNTER
Panel Management Review      Patient has the following on her problem list:     Asthma review     ACT Total Scores 1/30/2020   ACT TOTAL SCORE -   ASTHMA ER VISITS -   ASTHMA HOSPITALIZATIONS -   ACT TOTAL SCORE (Goal Greater than or Equal to 20) 13   In the past 12 months, how many times did you visit the emergency room for your asthma without being admitted to the hospital? 0   In the past 12 months, how many times were you hospitalized overnight because of your asthma? 0      1. Is Asthma diagnosis on the Problem List? Yes    2. Is Asthma listed on Health Maintenance? No   3. Patient is due for:  ACT and AAP    Diabetes    ASA: Not Required     Last A1C  Lab Results   Component Value Date    A1C 6.5 02/02/2021    A1C 5.6 11/08/2020    A1C 6.2 06/22/2020    A1C 7.2 01/23/2020    A1C 6.1 10/29/2019     A1C tested: Passed    Last LDL:    Lab Results   Component Value Date    CHOL 111 06/22/2020     Lab Results   Component Value Date    HDL 32 06/22/2020     Lab Results   Component Value Date    LDL 36 06/22/2020     Lab Results   Component Value Date    TRIG 213 06/22/2020     Lab Results   Component Value Date    CHOLHDLRATIO 5.0 09/10/2015     Lab Results   Component Value Date    NHDL 79 06/22/2020       Is the patient on a Statin? YES             Is the patient on Aspirin? NO    Medications     HMG CoA Reductase Inhibitors     simvastatin (ZOCOR) 20 MG tablet             Last three blood pressure readings:  BP Readings from Last 3 Encounters:   02/08/21 126/53   01/21/21 119/59   01/07/21 113/50       Date of last diabetes office visit: 08/03/21 at [facility]     Tobacco History:     History   Smoking Status     Current Every Day Smoker     Packs/day: 0.50     Years: 43.00     Types: Cigarettes   Smokeless Tobacco     Never Used     Comment: started at age 11.  Quit during pregnancyX4.  Quit 3 months ago.            Composite cancer screening  Chart review shows that this patient is due/due soon for the  following None  Summary:    Patient is due/failing the following:   Foot exam, ACP, vaccines, Hep C screen, lipids, microalbumin, BMP, A1C and PHYSICAL    Action needed:   Patient needs office visit for physical.    Type of outreach:    none, patient has transferred care to [facility] in [place]    Questions for provider review:    None                                                                                                                                    Joseline Acosta CMA       Chart routed to none .

## 2021-09-13 ENCOUNTER — VIRTUAL VISIT (OUTPATIENT)
Dept: PSYCHOLOGY | Facility: CLINIC | Age: 56
End: 2021-09-13
Payer: COMMERCIAL

## 2021-09-13 DIAGNOSIS — F31.62 MODERATE MIXED BIPOLAR I DISORDER (H): Primary | ICD-10-CM

## 2021-09-13 PROCEDURE — 90834 PSYTX W PT 45 MINUTES: CPT | Mod: 95 | Performed by: PSYCHOLOGIST
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
## 2021-09-15 DIAGNOSIS — I89.0 LYMPHEDEMA OF BOTH LOWER EXTREMITIES: ICD-10-CM

## 2021-09-16 RX ORDER — CHOLECALCIFEROL (VITAMIN D3) 25 MCG
TABLET ORAL
Qty: 30 TABLET | Refills: 0 | Status: SHIPPED | OUTPATIENT
Start: 2021-09-16 | End: 2021-10-19

## 2021-09-16 NOTE — TELEPHONE ENCOUNTER
Routing refill request to provider for review/approval because:  Patient needs to be seen because:  Overdue for 6 mo check  Last visit 11/23/2020.  Princess Atkins RN     
EMT/paramedic

## 2021-09-27 ENCOUNTER — VIRTUAL VISIT (OUTPATIENT)
Dept: PSYCHOLOGY | Facility: CLINIC | Age: 56
End: 2021-09-27
Payer: COMMERCIAL

## 2021-09-27 DIAGNOSIS — F31.62 MODERATE MIXED BIPOLAR I DISORDER (H): Primary | ICD-10-CM

## 2021-09-27 PROCEDURE — 90834 PSYTX W PT 45 MINUTES: CPT | Mod: 95 | Performed by: PSYCHOLOGIST
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
## 2021-10-11 ENCOUNTER — VIRTUAL VISIT (OUTPATIENT)
Dept: PSYCHOLOGY | Facility: CLINIC | Age: 56
End: 2021-10-11
Payer: COMMERCIAL

## 2021-10-11 DIAGNOSIS — F31.62 MODERATE MIXED BIPOLAR I DISORDER (H): Primary | ICD-10-CM

## 2021-10-11 PROCEDURE — 90834 PSYTX W PT 45 MINUTES: CPT | Mod: 95 | Performed by: PSYCHOLOGIST

## 2021-10-11 NOTE — PROGRESS NOTES
"                                           Progress Note  Disclaimer: Voice recognition software was used to generate this note. As a result, wrong word or 'sound-a-like' substitutions may have occurred due to the inherent limitations of voice recognition software. There may be errors in the script that have gone undetected. Please consider this when interpreting information found in this chart.     Patient Name: Bia Bill  Date: 10/11/21         Service Type: Individual      Session Start Time: 1100  session End Time: 1145  Session Length: 45    Session #: 78    Attendees: Client attended alone    Service Modality:  Phone Visit:    The patient has been notified of the following:      \"We have found that certain health care needs can be provided without the need for a face to face visit.  This service lets us provide the care you need with a phone conversation.       I will have full access to your [place] medical record during this entire phone call.   I will be taking notes for your medical record.      Since this is like an office visit, we will bill your insurance company for this service.       There are potential benefits and risks of telephone visits (e.g. limits to patient confidentiality) that differ from in-person visits.?  Confidentiality still applies for telephone services, and nobody will record the visit.  It is important to be in a quiet, private space that is free of distractions (including cell phone or other devices) during the visit.??      If during the course of the call I believe a telephone visit is not appropriate, you will not be charged for this service\"     Consent has been obtained for this service by care team member: Yes      Treatment Plan Last Reviewed: 10/11/21  PHQ-9 / CHIN-7 : see flowsheets    DATA  Interactive Complexity: No    Crisis: No       Progress Since Last Session (Related to Symptoms / Goals / Homework):   Symptoms: Improving Exercise and medications " helping.    Homework: Achieved / completed to satisfaction      Episode of Care Goals: Satisfactory progress - MAINTENANCE (Working to maintain change, with risk of relapse); Intervened by continuing to positively reinforce healthy behavior choice      Current / Ongoing Stressors and Concerns:  Still not talking to sister.   Seeing orthopedics for possible hip replacement.   Neighbor issues. Learning how to control her temper.   Things are better with her daughter now that they see each other daily.   Sleep is good with otc restless legs remedy and melatonin.      Treatment Objective(s) Addressed in This Session:   identify 2 strategies for more effectively managing Bipolar Disorder           Intervention:   CBT: behavioral activation        ASSESSMENT: Current Emotional / Mental Status (status of significant symptoms):   Risk status (Self / Other harm or suicidal ideation)   Patient denies current fears or concerns for personal safety.   Patient denies current or recent suicidal ideation or behaviors.   Patient denies current or recent homicidal ideation or behaviors.   Patient denies current or recent self injurious behavior or ideation.   Patient denies other safety concerns.   Patient reports there has been no change in risk factors since their last session.     Patient reports there has been no change in protective factors since their last session.     Recommended that patient call 911 or go to the local ED should there be a change in any of these risk factors.     Appearance:   Unable to assess    Eye Contact:   Unable to assess    Psychomotor Behavior: Unable to assess    Attitude:   Cooperative    Orientation:   All   Speech    Rate / Production: Normal     Volume:  Normal    Mood:    Normal   Affect:    Appropriate    Thought Content:  Clear    Thought Form:  Coherent  Logical    Insight:    Good      Medication Review:   No changes to current psychiatric medication(s)               No changes to psych  meds. Iron meds reduced dt nausea. Vit c started. Thinks she is on too many mood stabilizers. They are helping her work on her stuff.     Medication Compliance:   Yes     Changes in Health Issues:   None reported     Chemical Use Review:   Substance Use: Chemical use reviewed, no active concerns identified      Tobacco Use: No change in amount of tobacco use since last session.  Provided encouragement to quit  Client is no longer buying smokes, just bumming from others. Reduces her use.     Diagnosis:  1. Moderate mixed bipolar I disorder (H)        Collateral Reports Completed:   Not Applicable    PLAN: (Patient Tasks / Therapist Tasks / Other)  Client to maintain boundaries with family. Engage in 2 self-care activities per day. Enjoy her new life!      Kd CAMMYDominique Morris                                                         ______________________________________________________________________       Treatment Plan     Client's Name: Bia Bill               YOB: 1965     Date: 10/11/21        DSM5 Diagnoses: (Sustained by DSM5 Criteria Listed Above)  Diagnoses: 296.40 Bipolar I Disorder Current or Most Recent Episode Manic, Unspecified  Psychosocial & Contextual Factors: financial difficulties, chronic pain, roommate issues  WHODAS 2.0 (12 item)               This questionnaire asks about difficulties due to health conditions. Health conditions             include disease or illnesses, other health problems that may be short or long lasting,             injuries, mental health or emotional problems, and problems with alcohol or drugs.                         Think back over the past 30 days and answer these questions, thinking about how much             difficulty you had doing the following activities. For each question, please [place] only             one response.      S1  Standing for long periods such as 30 minutes?  Mild =           2    S2  Taking care of household responsibilities?   Moderate =   3    S3  Learning a new task, for example, learning how to get to a new place?  Mild =           2    S4  How much of a problem do you have joining community activities (for example, festivals, Rastafarian or other activities) in the same way as anyone else can?  Moderate =   3    S5  How much have you been emotionally affected by your health problems?  Mild =           2             In the past 30 days, how much difficulty did you have in:    S6  Concentrating on doing something for ten minutes?  Mild =           2    S7  Walking a long distance such as a kilometer (or equivalent)?  Severe =       4    S8  Washing your whole body?  None =         1    S9  Getting dressed?  None =         1    S10  Dealing with people you do not know?  Moderate =   3    S11  Maintaining a friendship?  Severe =       4    S12  Your day to day work?  Moderate =   3       H1  Overall, in the past 30 days, how many days were these difficulties present?  Record number of days seven    H2  In the past 30 days, for how many days were you totally unable to carry out your usual activities or work because of any health condition?  Record number of days  seven    H3  In the past 30 days, not counting the days that you were totally unable, for how many days did you cut back or reduce your usual activities or work because of any health condition?  Record number of days five                      Referral / Collaboration:  Referral to another professional/service is not indicated at this time..     Anticipated number of session or this episode of care: 60     Goals  1. Education- the Biopsychosocial model of depression  a. Client will be able to describe how depression is effecting them physically, emotionally and socially  2. Behavioral Activation  a. Client will learn to assess their depression on a day to day basis  b. Client will Identify two forms of exercise/activity and engage in them 3 times per week  c. Client will Identify 3  things that make them laugh, and engage in these 5 times per week.  d. Client will Identify 1-2Creative activities or hobbies  and engage in them 2 times per week  e. Client will identify music, movies, books that make them feel good and use them 3-4 times per week  3. Self-care  a. Client will identify 5 things they can do just for themselves  b. Client will take time for quiet, reflection, meditation 5 times per week  c. Client will Learn to set boundaries when appropriate  d. Client will Identify 2 individuals they can call on for support, distraction  4. Assessment of progress  a. Client will engage in assessment of their progress on a regular basis     Bipolar Disorder  Treatment plan:  1. Education- the Biopsychosocial model of Bipolar Disorder    a. Client will be able to describe in general terms what Bipolar Disorder  is and is not  b. Client will be able to describe how Bipolar Disorder  has affected their life in at least two different areas, such as school or work and Home/relationships  c. Clients parents/guardians or significant others will be provided information on what Bipolar Disorder  is and the ways it can affect relationships and be encouraged to be a part of clients treatment team.  2. Medication  a. Client will participate in medication evaluation for Bipolar Disorder  symptoms and follow medication recommendations.   3. Identification and management of triggers  a. Client and therapist will examine patient s history to determine if there are predictable triggers for manic or depressive episodes (e.g. boredom, anger, family stress)  b. Client and therapist will develop means of diffusing these triggers (e.g. relaxation strategies, boundary setting, anger management)  4. Comorbid conditions   a. Client and therapist will asses for comorbid conditions (e.g. anxiety, depression, substance use). and add additional items to treatment plan as needed  5. Self-care  a. Client will identify 3 things  they can do just for themselves  b. Client will take time for quiet, reflection, meditation 3 times per week  c. Client will Learn to set boundaries when appropriate  d. Client will Identify 2 individuals they can call on for support, distraction  5. Behavioral Activation  a. Client will Identify two forms of exercise/activity and engage in them 3 times per week  b. Client will Identify 2 things that make them laugh, and engage in these 3 times per week.  c. Client will Identify 2 Creative activities or hobbies  and engage in them 2 times per week  d. Client will identify music, movies, books that make them feel good and use them 3 times per week  6. Assessment of progress  a. Client will engage in assessment of their progress on a regular basis     Client has reviewed and agreed to the above plan.        Kd Morris             7/24/2020

## 2021-10-18 DIAGNOSIS — I89.0 LYMPHEDEMA OF BOTH LOWER EXTREMITIES: ICD-10-CM

## 2021-10-19 RX ORDER — CHOLECALCIFEROL (VITAMIN D3) 25 MCG
TABLET ORAL
Qty: 30 TABLET | Refills: 0 | Status: SHIPPED | OUTPATIENT
Start: 2021-10-19 | End: 2021-11-21

## 2021-10-19 NOTE — TELEPHONE ENCOUNTER
Routing refill request to provider to decide. Patient was in the hospital last week.  Thank you.  Mundo Conde RN

## 2021-10-25 ENCOUNTER — VIRTUAL VISIT (OUTPATIENT)
Dept: PSYCHOLOGY | Facility: CLINIC | Age: 56
End: 2021-10-25
Payer: COMMERCIAL

## 2021-10-25 DIAGNOSIS — F31.62 MODERATE MIXED BIPOLAR I DISORDER (H): Primary | ICD-10-CM

## 2021-10-25 PROCEDURE — 90834 PSYTX W PT 45 MINUTES: CPT | Mod: 95 | Performed by: PSYCHOLOGIST

## 2021-10-25 NOTE — PROGRESS NOTES
"                                           Progress Note  Disclaimer: Voice recognition software was used to generate this note. As a result, wrong word or 'sound-a-like' substitutions may have occurred due to the inherent limitations of voice recognition software. There may be errors in the script that have gone undetected. Please consider this when interpreting information found in this chart.     Patient Name: Bia Bill  Date: 10/25/21         Service Type: Individual      Session Start Time: 1100  session End Time: 1145  Session Length: 45    Session #: 79    Attendees: Client attended alone    Service Modality:  Phone Visit:    The patient has been notified of the following:      \"We have found that certain health care needs can be provided without the need for a face to face visit.  This service lets us provide the care you need with a phone conversation.       I will have full access to your [facility] medical record during this entire phone call.   I will be taking notes for your medical record.      Since this is like an office visit, we will bill your insurance company for this service.       There are potential benefits and risks of telephone visits (e.g. limits to patient confidentiality) that differ from in-person visits.?  Confidentiality still applies for telephone services, and nobody will record the visit.  It is important to be in a quiet, private space that is free of distractions (including cell phone or other devices) during the visit.??      If during the course of the call I believe a telephone visit is not appropriate, you will not be charged for this service\"     Consent has been obtained for this service by care team member: Yes      Treatment Plan Last Reviewed: 10/11/21  PHQ-9 / CHIN-7 : see flowsheets    DATA  Interactive Complexity: No    Crisis: No       Progress Since Last Session (Related to Symptoms / Goals / Homework):   Symptoms: Improving Exercise and medications " helping.    Homework: Achieved / completed to satisfaction      Episode of Care Goals: Satisfactory progress - MAINTENANCE (Working to maintain change, with risk of relapse); Intervened by continuing to positively reinforce healthy behavior choice      Current / Ongoing Stressors and Concerns:  Had syncopal episode last week/ Severe sepsis, spo2 low, early stage heart failure. Hypoxia. See admission notes from 10/13/21. Last cigarette then now on permanent oxygen.     PT and wound care coming to house.     Feeling a little depressed dt limitations imposed by need for O2.       Treatment Objective(s) Addressed in This Session:   identify 2 strategies for more effectively managing Bipolar Disorder    Processing reaction to hospitalization and having to quit smoking.         Intervention:   CBT: behavioral activation  Support for recent trauma and subsequent need for life changes.      ASSESSMENT: Current Emotional / Mental Status (status of significant symptoms):   Risk status (Self / Other harm or suicidal ideation)   Patient denies current fears or concerns for personal safety.   Patient denies current or recent suicidal ideation or behaviors.   Patient denies current or recent homicidal ideation or behaviors.   Patient denies current or recent self injurious behavior or ideation.   Patient denies other safety concerns.   Patient reports there has been no change in risk factors since their last session.     Patient reports there has been no change in protective factors since their last session.     Recommended that patient call 911 or go to the local ED should there be a change in any of these risk factors.     Appearance:   Unable to assess    Eye Contact:   Unable to assess    Psychomotor Behavior: Unable to assess    Attitude:   Cooperative    Orientation:   All   Speech    Rate / Production: Normal     Volume:  Normal    Mood:    Anxious  grateful to be alive   Affect:    Appropriate    Thought Content:  Clear     Thought Form:  Coherent  Logical    Insight:    Good      Medication Review:   Changes to psychiatric medications, see updated Medication List in EPIC.               Multiple changes, see hospital records.      Medication Compliance:   Yes     Changes in Health Issues:   Yes: Cardiac issues, Associated Psychological Distress     Chemical Use Review:   Substance Use: Chemical use reviewed, no active concerns identified      Tobacco Use: No current tobacco use.  Quit in hospital.     Diagnosis:  1. Moderate mixed bipolar I disorder (H)        Collateral Reports Completed:   Not Applicable    PLAN: (Patient Tasks / Therapist Tasks / Other)  Client to maintain boundaries with family. Engage in 2 self-care activities per day. Either get outside or look out the window at least 20 mn per day.      Kd Morris                                                         ______________________________________________________________________       Treatment Plan     Client's Name: Bia Bill               YOB: 1965     Date: 10/11/21        DSM5 Diagnoses: (Sustained by DSM5 Criteria Listed Above)  Diagnoses: 296.40 Bipolar I Disorder Current or Most Recent Episode Manic, Unspecified  Psychosocial & Contextual Factors: financial difficulties, chronic pain, roommate issues  WHODAS 2.0 (12 item)               This questionnaire asks about difficulties due to health conditions. Health conditions             include disease or illnesses, other health problems that may be short or long lasting,             injuries, mental health or emotional problems, and problems with alcohol or drugs.                         Think back over the past 30 days and answer these questions, thinking about how much             difficulty you had doing the following activities. For each question, please [place] only             one response.      S1  Standing for long periods such as 30 minutes?  Mild =           2    S2  Taking  care of household responsibilities?  Moderate =   3    S3  Learning a new task, for example, learning how to get to a new place?  Mild =           2    S4  How much of a problem do you have joining community activities (for example, festivals, Judaism or other activities) in the same way as anyone else can?  Moderate =   3    S5  How much have you been emotionally affected by your health problems?  Mild =           2             In the past 30 days, how much difficulty did you have in:    S6  Concentrating on doing something for ten minutes?  Mild =           2    S7  Walking a long distance such as a kilometer (or equivalent)?  Severe =       4    S8  Washing your whole body?  None =         1    S9  Getting dressed?  None =         1    S10  Dealing with people you do not know?  Moderate =   3    S11  Maintaining a friendship?  Severe =       4    S12  Your day to day work?  Moderate =   3       H1  Overall, in the past 30 days, how many days were these difficulties present?  Record number of days seven    H2  In the past 30 days, for how many days were you totally unable to carry out your usual activities or work because of any health condition?  Record number of days  seven    H3  In the past 30 days, not counting the days that you were totally unable, for how many days did you cut back or reduce your usual activities or work because of any health condition?  Record number of days five                      Referral / Collaboration:  Referral to another professional/service is not indicated at this time..     Anticipated number of session or this episode of care: 60     Goals  1. Education- the Biopsychosocial model of depression  a. Client will be able to describe how depression is effecting them physically, emotionally and socially  2. Behavioral Activation  a. Client will learn to assess their depression on a day to day basis  b. Client will Identify two forms of exercise/activity and engage in them 3 times  per week  c. Client will Identify 3 things that make them laugh, and engage in these 5 times per week.  d. Client will Identify 1-2Creative activities or hobbies  and engage in them 2 times per week  e. Client will identify music, movies, books that make them feel good and use them 3-4 times per week  3. Self-care  a. Client will identify 5 things they can do just for themselves  b. Client will take time for quiet, reflection, meditation 5 times per week  c. Client will Learn to set boundaries when appropriate  d. Client will Identify 2 individuals they can call on for support, distraction  4. Assessment of progress  a. Client will engage in assessment of their progress on a regular basis     Bipolar Disorder  Treatment plan:  1. Education- the Biopsychosocial model of Bipolar Disorder    a. Client will be able to describe in general terms what Bipolar Disorder  is and is not  b. Client will be able to describe how Bipolar Disorder  has affected their life in at least two different areas, such as school or work and Home/relationships  c. Clients parents/guardians or significant others will be provided information on what Bipolar Disorder  is and the ways it can affect relationships and be encouraged to be a part of clients treatment team.  2. Medication  a. Client will participate in medication evaluation for Bipolar Disorder  symptoms and follow medication recommendations.   3. Identification and management of triggers  a. Client and therapist will examine patient s history to determine if there are predictable triggers for manic or depressive episodes (e.g. boredom, anger, family stress)  b. Client and therapist will develop means of diffusing these triggers (e.g. relaxation strategies, boundary setting, anger management)  4. Comorbid conditions   a. Client and therapist will asses for comorbid conditions (e.g. anxiety, depression, substance use). and add additional items to treatment plan as  needed  5. Self-care  a. Client will identify 3 things they can do just for themselves  b. Client will take time for quiet, reflection, meditation 3 times per week  c. Client will Learn to set boundaries when appropriate  d. Client will Identify 2 individuals they can call on for support, distraction  5. Behavioral Activation  a. Client will Identify two forms of exercise/activity and engage in them 3 times per week  b. Client will Identify 2 things that make them laugh, and engage in these 3 times per week.  c. Client will Identify 2 Creative activities or hobbies  and engage in them 2 times per week  d. Client will identify music, movies, books that make them feel good and use them 3 times per week  6. Assessment of progress  a. Client will engage in assessment of their progress on a regular basis     Client has reviewed and agreed to the above plan.        Kd Morris             7/24/2020

## 2021-11-08 ENCOUNTER — VIRTUAL VISIT (OUTPATIENT)
Dept: PSYCHOLOGY | Facility: CLINIC | Age: 56
End: 2021-11-08
Payer: COMMERCIAL

## 2021-11-08 DIAGNOSIS — F31.62 MODERATE MIXED BIPOLAR I DISORDER (H): Primary | ICD-10-CM

## 2021-11-08 PROCEDURE — 90834 PSYTX W PT 45 MINUTES: CPT | Mod: 95 | Performed by: PSYCHOLOGIST

## 2021-11-08 NOTE — PROGRESS NOTES
"                                           Progress Note  Disclaimer: Voice recognition software was used to generate this note. As a result, wrong word or 'sound-a-like' substitutions may have occurred due to the inherent limitations of voice recognition software. There may be errors in the script that have gone undetected. Please consider this when interpreting information found in this chart.     Patient Name: Bia Bill  Date: 11/8/21         Service Type: Individual      Session Start Time: 1100  session End Time: 1145  Session Length: 45    Session #: 80    Attendees: Client attended alone    Service Modality:  Phone Visit:    The patient has been notified of the following:      \"We have found that certain health care needs can be provided without the need for a face to face visit.  This service lets us provide the care you need with a phone conversation.       I will have full access to your [facility] medical record during this entire phone call.   I will be taking notes for your medical record.      Since this is like an office visit, we will bill your insurance company for this service.       There are potential benefits and risks of telephone visits (e.g. limits to patient confidentiality) that differ from in-person visits.?  Confidentiality still applies for telephone services, and nobody will record the visit.  It is important to be in a quiet, private space that is free of distractions (including cell phone or other devices) during the visit.??      If during the course of the call I believe a telephone visit is not appropriate, you will not be charged for this service\"     Consent has been obtained for this service by care team member: Yes      Treatment Plan Last Reviewed: 10/11/21  PHQ-9 / CHIN-7 : see flowsheets    DATA  Interactive Complexity: No    Crisis: No       Progress Since Last Session (Related to Symptoms / Goals / Homework):   Symptoms: Improving Exercise and medications " helping.    Homework: Achieved / completed to satisfaction      Episode of Care Goals: Satisfactory progress - MAINTENANCE (Working to maintain change, with risk of relapse); Intervened by continuing to positively reinforce healthy behavior choice      Current / Ongoing Stressors and Concerns:  Worried about grandsons safety with Daughter's boyfriend around.   Got a fish tank with 4 fish, very relaxing.   Wants to work hard in PT so she can regain more independence.      Treatment Objective(s) Addressed in This Session:   identify 2 strategies for more effectively managing Bipolar Disorder    Processing reaction to hospitalization and having to quit smoking.         Intervention:   CBT: behavioral activation  Support for recent trauma and subsequent need for life changes.      ASSESSMENT: Current Emotional / Mental Status (status of significant symptoms):   Risk status (Self / Other harm or suicidal ideation)   Patient denies current fears or concerns for personal safety.   Patient denies current or recent suicidal ideation or behaviors.   Patient denies current or recent homicidal ideation or behaviors.   Patient denies current or recent self injurious behavior or ideation.   Patient denies other safety concerns.   Patient reports there has been no change in risk factors since their last session.     Patient reports there has been no change in protective factors since their last session.     Recommended that patient call 911 or go to the local ED should there be a change in any of these risk factors.     Appearance:   Unable to assess    Eye Contact:   Unable to assess    Psychomotor Behavior: Unable to assess    Attitude:   Cooperative    Orientation:   All   Speech    Rate / Production: Normal     Volume:  Normal    Mood:    grateful to be alive, happier.   Affect:    Appropriate    Thought Content:  Clear    Thought Form:  Coherent  Logical    Insight:    Good      Medication Review:   Changes to psychiatric  medications, see updated Medication List in EPIC.               Multiple changes, see hospital records.      Medication Compliance:   Yes     Changes in Health Issues:   Yes: Cardiac issues, Associated Psychological Distress     Chemical Use Review:   Substance Use: Chemical use reviewed, no active concerns identified      Tobacco Use: No current tobacco use.  Quit in hospital.     Diagnosis:  1. Moderate mixed bipolar I disorder (H)        Collateral Reports Completed:   Not Applicable    PLAN: (Patient Tasks / Therapist Tasks / Other)  Client to contact PCP about  O2 concerns (Nosebleeds). Client to  Continue with physical therapy twice per week. Maintain boundaries with daughter regarding particpation in session.      Kd CHAWLADominique Morris                                                         ______________________________________________________________________       Treatment Plan     Client's Name: Bia Bill               YOB: 1965     Date: 10/11/21        DSM5 Diagnoses: (Sustained by DSM5 Criteria Listed Above)  Diagnoses: 296.40 Bipolar I Disorder Current or Most Recent Episode Manic, Unspecified  Psychosocial & Contextual Factors: financial difficulties, chronic pain, roommate issues  WHODAS 2.0 (12 item)               This questionnaire asks about difficulties due to health conditions. Health conditions             include disease or illnesses, other health problems that may be short or long lasting,             injuries, mental health or emotional problems, and problems with alcohol or drugs.                         Think back over the past 30 days and answer these questions, thinking about how much             difficulty you had doing the following activities. For each question, please [place] only             one response.      S1  Standing for long periods such as 30 minutes?  Mild =           2    S2  Taking care of household responsibilities?  Moderate =   3    S3  Learning a new  task, for example, learning how to get to a new place?  Mild =           2    S4  How much of a problem do you have joining community activities (for example, festivals, Mu-ism or other activities) in the same way as anyone else can?  Moderate =   3    S5  How much have you been emotionally affected by your health problems?  Mild =           2             In the past 30 days, how much difficulty did you have in:    S6  Concentrating on doing something for ten minutes?  Mild =           2    S7  Walking a long distance such as a kilometer (or equivalent)?  Severe =       4    S8  Washing your whole body?  None =         1    S9  Getting dressed?  None =         1    S10  Dealing with people you do not know?  Moderate =   3    S11  Maintaining a friendship?  Severe =       4    S12  Your day to day work?  Moderate =   3       H1  Overall, in the past 30 days, how many days were these difficulties present?  Record number of days seven    H2  In the past 30 days, for how many days were you totally unable to carry out your usual activities or work because of any health condition?  Record number of days  seven    H3  In the past 30 days, not counting the days that you were totally unable, for how many days did you cut back or reduce your usual activities or work because of any health condition?  Record number of days five                      Referral / Collaboration:  Referral to another professional/service is not indicated at this time..     Anticipated number of session or this episode of care: 60     Goals  1. Education- the Biopsychosocial model of depression  a. Client will be able to describe how depression is effecting them physically, emotionally and socially  2. Behavioral Activation  a. Client will learn to assess their depression on a day to day basis  b. Client will Identify two forms of exercise/activity and engage in them 3 times per week  c. Client will Identify 3 things that make them laugh, and  engage in these 5 times per week.  d. Client will Identify 1-2Creative activities or hobbies  and engage in them 2 times per week  e. Client will identify music, movies, books that make them feel good and use them 3-4 times per week  3. Self-care  a. Client will identify 5 things they can do just for themselves  b. Client will take time for quiet, reflection, meditation 5 times per week  c. Client will Learn to set boundaries when appropriate  d. Client will Identify 2 individuals they can call on for support, distraction  4. Assessment of progress  a. Client will engage in assessment of their progress on a regular basis     Bipolar Disorder  Treatment plan:  1. Education- the Biopsychosocial model of Bipolar Disorder    a. Client will be able to describe in general terms what Bipolar Disorder  is and is not  b. Client will be able to describe how Bipolar Disorder  has affected their life in at least two different areas, such as school or work and Home/relationships  c. Clients parents/guardians or significant others will be provided information on what Bipolar Disorder  is and the ways it can affect relationships and be encouraged to be a part of clients treatment team.  2. Medication  a. Client will participate in medication evaluation for Bipolar Disorder  symptoms and follow medication recommendations.   3. Identification and management of triggers  a. Client and therapist will examine patient s history to determine if there are predictable triggers for manic or depressive episodes (e.g. boredom, anger, family stress)  b. Client and therapist will develop means of diffusing these triggers (e.g. relaxation strategies, boundary setting, anger management)  4. Comorbid conditions   a. Client and therapist will asses for comorbid conditions (e.g. anxiety, depression, substance use). and add additional items to treatment plan as needed  5. Self-care  a. Client will identify 3 things they can do just for  themselves  b. Client will take time for quiet, reflection, meditation 3 times per week  c. Client will Learn to set boundaries when appropriate  d. Client will Identify 2 individuals they can call on for support, distraction  5. Behavioral Activation  a. Client will Identify two forms of exercise/activity and engage in them 3 times per week  b. Client will Identify 2 things that make them laugh, and engage in these 3 times per week.  c. Client will Identify 2 Creative activities or hobbies  and engage in them 2 times per week  d. Client will identify music, movies, books that make them feel good and use them 3 times per week  6. Assessment of progress  a. Client will engage in assessment of their progress on a regular basis     Client has reviewed and agreed to the above plan.        Kd Morris             7/24/2020

## 2021-11-19 DIAGNOSIS — I89.0 LYMPHEDEMA OF BOTH LOWER EXTREMITIES: ICD-10-CM

## 2021-11-21 RX ORDER — CHOLECALCIFEROL (VITAMIN D3) 25 MCG
TABLET ORAL
Qty: 30 TABLET | Refills: 0 | Status: SHIPPED | OUTPATIENT
Start: 2021-11-21 | End: 2021-12-20

## 2021-11-22 DIAGNOSIS — I89.0 LYMPHEDEMA OF BOTH LOWER EXTREMITIES: ICD-10-CM

## 2021-11-22 RX ORDER — VITAMIN B COMPLEX
1 TABLET ORAL DAILY
Qty: 30 TABLET | Refills: 0 | OUTPATIENT
Start: 2021-11-22

## 2021-11-23 ENCOUNTER — VIRTUAL VISIT (OUTPATIENT)
Dept: PSYCHOLOGY | Facility: CLINIC | Age: 56
End: 2021-11-23
Payer: COMMERCIAL

## 2021-11-23 DIAGNOSIS — F31.62 MODERATE MIXED BIPOLAR I DISORDER (H): Primary | ICD-10-CM

## 2021-11-23 DIAGNOSIS — I89.0 LYMPHEDEMA OF BOTH LOWER EXTREMITIES: ICD-10-CM

## 2021-11-23 PROCEDURE — 90834 PSYTX W PT 45 MINUTES: CPT | Mod: 95 | Performed by: PSYCHOLOGIST

## 2021-11-23 RX ORDER — VITAMIN B COMPLEX
1 TABLET ORAL DAILY
Qty: 30 TABLET | Refills: 0 | OUTPATIENT
Start: 2021-11-23

## 2021-11-23 NOTE — PROGRESS NOTES
"                                           Progress Note  Disclaimer: Voice recognition software was used to generate this note. As a result, wrong word or 'sound-a-like' substitutions may have occurred due to the inherent limitations of voice recognition software. There may be errors in the script that have gone undetected. Please consider this when interpreting information found in this chart.     Patient Name: Bia Bill  Date: 11/23/21         Service Type: Individual      Session Start Time: 1100  session End Time: 1145  Session Length: 45    Session #: 81    Attendees: Client attended alone    Service Modality:  Phone Visit:    The patient has been notified of the following:      \"We have found that certain health care needs can be provided without the need for a face to face visit.  This service lets us provide the care you need with a phone conversation.       I will have full access to your [place] medical record during this entire phone call.   I will be taking notes for your medical record.      Since this is like an office visit, we will bill your insurance company for this service.       There are potential benefits and risks of telephone visits (e.g. limits to patient confidentiality) that differ from in-person visits.?  Confidentiality still applies for telephone services, and nobody will record the visit.  It is important to be in a quiet, private space that is free of distractions (including cell phone or other devices) during the visit.??      If during the course of the call I believe a telephone visit is not appropriate, you will not be charged for this service\"     Consent has been obtained for this service by care team member: Yes      Treatment Plan Last Reviewed: 10/11/21  PHQ-9 / CHIN-7 : see flowsheets    DATA  Interactive Complexity: No    Crisis: No       Progress Since Last Session (Related to Symptoms / Goals / Homework):   Symptoms: Improving Exercise and medications " helping.    Homework: Achieved / completed to satisfaction      Episode of Care Goals: Satisfactory progress - MAINTENANCE (Working to maintain change, with risk of relapse); Intervened by continuing to positively reinforce healthy behavior choice   Former s/o  of purposeful starvation yesterday at the age of 81. Still has stuff down there. Probably will never see those things again again. Lots of conflicting emotions:anger, loss, resentment, relief.      Treatment Objective(s) Addressed in This Session:   identify 2 strategies for more effectively managing Bipolar Disorder    Processing reaction to hospitalization and having to quit smoking.         Intervention:   CBT: behavioral activation  Support for recent trauma and subsequent need for life changes.      ASSESSMENT: Current Emotional / Mental Status (status of significant symptoms):   Risk status (Self / Other harm or suicidal ideation)   Patient denies current fears or concerns for personal safety.   Patient denies current or recent suicidal ideation or behaviors.   Patient denies current or recent homicidal ideation or behaviors.   Patient denies current or recent self injurious behavior or ideation.   Patient denies other safety concerns.   Patient reports there has been no change in risk factors since their last session.     Patient reports there has been no change in protective factors since their last session.     Recommended that patient call 911 or go to the local ED should there be a change in any of these risk factors.     Appearance:   Unable to assess    Eye Contact:   Unable to assess    Psychomotor Behavior: Unable to assess    Attitude:   Cooperative    Orientation:   All   Speech    Rate / Production: Normal     Volume:  Normal    Mood:    Grieving angry, resentful, all of the above.   Affect:    Appropriate    Thought Content:  Clear    Thought Form:  Coherent  Logical    Insight:    Good      Medication Review:   Changes to psychiatric  medications, see updated Medication List in EPIC.               Multiple changes, see hospital records.      Medication Compliance:   Yes     Changes in Health Issues:   Yes: Cardiac issues, Associated Psychological Distress     Chemical Use Review:   Substance Use: Chemical use reviewed, no active concerns identified      Tobacco Use: No current tobacco use.  Quit in hospital.     Diagnosis:  1. Moderate mixed bipolar I disorder (H)        Collateral Reports Completed:   Not Applicable    PLAN: (Patient Tasks / Therapist Tasks / Other)  Client to  Continue with physical therapy twice per week. Grieve as she needs to  All feelings are fair game.    Kd CAMMYDominique Morris                                                         ______________________________________________________________________       Treatment Plan     Client's Name: Bia Bill               YOB: 1965     Date: 10/11/21        DSM5 Diagnoses: (Sustained by DSM5 Criteria Listed Above)  Diagnoses: 296.40 Bipolar I Disorder Current or Most Recent Episode Manic, Unspecified  Psychosocial & Contextual Factors: financial difficulties, chronic pain, roommate issues  WHODAS 2.0 (12 item)               This questionnaire asks about difficulties due to health conditions. Health conditions             include disease or illnesses, other health problems that may be short or long lasting,             injuries, mental health or emotional problems, and problems with alcohol or drugs.                         Think back over the past 30 days and answer these questions, thinking about how much             difficulty you had doing the following activities. For each question, please [place] only             one response.      S1  Standing for long periods such as 30 minutes?  Mild =           2    S2  Taking care of household responsibilities?  Moderate =   3    S3  Learning a new task, for example, learning how to get to a new place?  Mild =            2    S4  How much of a problem do you have joining community activities (for example, festivals, Christian or other activities) in the same way as anyone else can?  Moderate =   3    S5  How much have you been emotionally affected by your health problems?  Mild =           2             In the past 30 days, how much difficulty did you have in:    S6  Concentrating on doing something for ten minutes?  Mild =           2    S7  Walking a long distance such as a kilometer (or equivalent)?  Severe =       4    S8  Washing your whole body?  None =         1    S9  Getting dressed?  None =         1    S10  Dealing with people you do not know?  Moderate =   3    S11  Maintaining a friendship?  Severe =       4    S12  Your day to day work?  Moderate =   3       H1  Overall, in the past 30 days, how many days were these difficulties present?  Record number of days seven    H2  In the past 30 days, for how many days were you totally unable to carry out your usual activities or work because of any health condition?  Record number of days  seven    H3  In the past 30 days, not counting the days that you were totally unable, for how many days did you cut back or reduce your usual activities or work because of any health condition?  Record number of days five                      Referral / Collaboration:  Referral to another professional/service is not indicated at this time..     Anticipated number of session or this episode of care: 60     Goals  1. Education- the Biopsychosocial model of depression  a. Client will be able to describe how depression is effecting them physically, emotionally and socially  2. Behavioral Activation  a. Client will learn to assess their depression on a day to day basis  b. Client will Identify two forms of exercise/activity and engage in them 3 times per week  c. Client will Identify 3 things that make them laugh, and engage in these 5 times per week.  d. Client will Identify 1-2Creative  activities or hobbies  and engage in them 2 times per week  e. Client will identify music, movies, books that make them feel good and use them 3-4 times per week  3. Self-care  a. Client will identify 5 things they can do just for themselves  b. Client will take time for quiet, reflection, meditation 5 times per week  c. Client will Learn to set boundaries when appropriate  d. Client will Identify 2 individuals they can call on for support, distraction  4. Assessment of progress  a. Client will engage in assessment of their progress on a regular basis     Bipolar Disorder  Treatment plan:  1. Education- the Biopsychosocial model of Bipolar Disorder    a. Client will be able to describe in general terms what Bipolar Disorder  is and is not  b. Client will be able to describe how Bipolar Disorder  has affected their life in at least two different areas, such as school or work and Home/relationships  c. Clients parents/guardians or significant others will be provided information on what Bipolar Disorder  is and the ways it can affect relationships and be encouraged to be a part of clients treatment team.  2. Medication  a. Client will participate in medication evaluation for Bipolar Disorder  symptoms and follow medication recommendations.   3. Identification and management of triggers  a. Client and therapist will examine patient s history to determine if there are predictable triggers for manic or depressive episodes (e.g. boredom, anger, family stress)  b. Client and therapist will develop means of diffusing these triggers (e.g. relaxation strategies, boundary setting, anger management)  4. Comorbid conditions   a. Client and therapist will asses for comorbid conditions (e.g. anxiety, depression, substance use). and add additional items to treatment plan as needed  5. Self-care  a. Client will identify 3 things they can do just for themselves  b. Client will take time for quiet, reflection, meditation 3 times per  week  c. Client will Learn to set boundaries when appropriate  d. Client will Identify 2 individuals they can call on for support, distraction  5. Behavioral Activation  a. Client will Identify two forms of exercise/activity and engage in them 3 times per week  b. Client will Identify 2 things that make them laugh, and engage in these 3 times per week.  c. Client will Identify 2 Creative activities or hobbies  and engage in them 2 times per week  d. Client will identify music, movies, books that make them feel good and use them 3 times per week  6. Assessment of progress  a. Client will engage in assessment of their progress on a regular basis     Client has reviewed and agreed to the above plan.        Kd Morris             7/24/2020

## 2021-12-08 ENCOUNTER — VIRTUAL VISIT (OUTPATIENT)
Dept: PSYCHOLOGY | Facility: CLINIC | Age: 56
End: 2021-12-08
Payer: COMMERCIAL

## 2021-12-08 DIAGNOSIS — F31.62 MODERATE MIXED BIPOLAR I DISORDER (H): Primary | ICD-10-CM

## 2021-12-08 PROCEDURE — 90834 PSYTX W PT 45 MINUTES: CPT | Mod: 95 | Performed by: PSYCHOLOGIST

## 2021-12-08 NOTE — PROGRESS NOTES
"                                           Progress Note  Disclaimer: Voice recognition software was used to generate this note. As a result, wrong word or 'sound-a-like' substitutions may have occurred due to the inherent limitations of voice recognition software. There may be errors in the script that have gone undetected. Please consider this when interpreting information found in this chart.     Patient Name: Bia Bill  Date: 12/8/21         Service Type: Individual      Session Start Time: 2:00PM  session End Time: 2:45PM  Session Length: 45    Session #: 82    Attendees: Client attended alone    Service Modality:  Phone Visit:    The patient has been notified of the following:      \"We have found that certain health care needs can be provided without the need for a face to face visit.  This service lets us provide the care you need with a phone conversation.       I will have full access to your [place] medical record during this entire phone call.   I will be taking notes for your medical record.      Since this is like an office visit, we will bill your insurance company for this service.       There are potential benefits and risks of telephone visits (e.g. limits to patient confidentiality) that differ from in-person visits.?  Confidentiality still applies for telephone services, and nobody will record the visit.  It is important to be in a quiet, private space that is free of distractions (including cell phone or other devices) during the visit.??      If during the course of the call I believe a telephone visit is not appropriate, you will not be charged for this service\"     Consent has been obtained for this service by care team member: Yes      Treatment Plan Last Reviewed: 10/11/21  PHQ-9 / CHIN-7 : see flowsheets    DATA  Interactive Complexity: No    Crisis: No       Progress Since Last Session (Related to Symptoms / Goals / Homework):   Symptoms: Improving Exercise and medications " helping.    Homework: Achieved / completed to satisfaction      Episode of Care Goals: Satisfactory progress - MAINTENANCE (Working to maintain change, with risk of relapse); Intervened by continuing to positively reinforce healthy behavior choice   Had a fall dt short O2 hose. Did the splits and landed on her rear.   Former s/o's family is raising a lot of problems.     Will be doing [employer] to relieve stress during the holidays. Having family over for [place].     Treatment Objective(s) Addressed in This Session:   identify 2 strategies for more effectively managing Bipolar Disorder    Processing reaction to hospitalization and having to quit smoking.         Intervention:   CBT: behavioral activation  Support for recent trauma and subsequent need for life changes.      ASSESSMENT: Current Emotional / Mental Status (status of significant symptoms):   Risk status (Self / Other harm or suicidal ideation)   Patient denies current fears or concerns for personal safety.   Patient denies current or recent suicidal ideation or behaviors.   Patient denies current or recent homicidal ideation or behaviors.   Patient denies current or recent self injurious behavior or ideation.   Patient denies other safety concerns.   Patient reports there has been no change in risk factors since their last session.     Patient reports there has been no change in protective factors since their last session.     Recommended that patient call 911 or go to the local ED should there be a change in any of these risk factors.     Appearance:   Unable to assess    Eye Contact:   Unable to assess    Psychomotor Behavior: Unable to assess    Attitude:   Cooperative    Orientation:   All   Speech    Rate / Production: Normal     Volume:  Normal    Mood:    Grieving angry, resentful, all of the above.   Affect:    Appropriate    Thought Content:  Clear    Thought Form:  Coherent  Logical    Insight:    Good      Medication  Review:   Changes to psychiatric medications, see updated Medication List in EPIC.               Multiple changes, see hospital records.      Medication Compliance:   Yes     Changes in Health Issues:   Yes: Cardiac issues, Associated Psychological Distress     Chemical Use Review:   Substance Use: Chemical use reviewed, no active concerns identified      Tobacco Use: No current tobacco use.  Quit in hospital.     Diagnosis:  1. Moderate mixed bipolar I disorder (H)        Collateral Reports Completed:   Not Applicable    PLAN: (Patient Tasks / Therapist Tasks / Other)  Client to  Continue with physical therapy twice per week. Grieve as she needs to  All feelings are fair game.    Kd Morris                                                         ______________________________________________________________________       Treatment Plan     Client's Name: Bia Bill               YOB: 1965     Date: 10/11/21        DSM5 Diagnoses: (Sustained by DSM5 Criteria Listed Above)  Diagnoses: 296.40 Bipolar I Disorder Current or Most Recent Episode Manic, Unspecified  Psychosocial & Contextual Factors: financial difficulties, chronic pain, roommate issues  WHODAS 2.0 (12 item)               This questionnaire asks about difficulties due to health conditions. Health conditions             include disease or illnesses, other health problems that may be short or long lasting,             injuries, mental health or emotional problems, and problems with alcohol or drugs.                         Think back over the past 30 days and answer these questions, thinking about how much             difficulty you had doing the following activities. For each question, please [place] only             one response.      S1  Standing for long periods such as 30 minutes?  Mild =           2    S2  Taking care of household responsibilities?  Moderate =   3    S3  Learning a new task, for example, learning how to get to  a new place?  Mild =           2    S4  How much of a problem do you have joining community activities (for example, festivals, [employer]ist or other activities) in the same way as anyone else can?  Moderate =   3    S5  How much have you been emotionally affected by your health problems?  Mild =           2             In the past 30 days, how much difficulty did you have in:    S6  Concentrating on doing something for ten minutes?  Mild =           2    S7  Walking a long distance such as a kilometer (or equivalent)?  Severe =       4    S8  Washing your whole body?  None =         1    S9  Getting dressed?  None =         1    S10  Dealing with people you do not know?  Moderate =   3    S11  Maintaining a friendship?  Severe =       4    S12  Your day to day work?  Moderate =   3       H1  Overall, in the past 30 days, how many days were these difficulties present?  Record number of days seven    H2  In the past 30 days, for how many days were you totally unable to carry out your usual activities or work because of any health condition?  Record number of days  seven    H3  In the past 30 days, not counting the days that you were totally unable, for how many days did you cut back or reduce your usual activities or work because of any health condition?  Record number of days five                      Referral / Collaboration:  Referral to another professional/service is not indicated at this time..     Anticipated number of session or this episode of care: 60     Goals  1. Education- the Biopsychosocial model of depression  a. Client will be able to describe how depression is effecting them physically, emotionally and socially  2. Behavioral Activation  a. Client will learn to assess their depression on a day to day basis  b. Client will Identify two forms of exercise/activity and engage in them 3 times per week  c. Client will Identify 3 things that make them laugh, and engage in these 5 times per week.  d. Client  will Identify 1-2Creative activities or hobbies  and engage in them 2 times per week  e. Client will identify music, movies, books that make them feel good and use them 3-4 times per week  3. Self-care  a. Client will identify 5 things they can do just for themselves  b. Client will take time for quiet, reflection, meditation 5 times per week  c. Client will Learn to set boundaries when appropriate  d. Client will Identify 2 individuals they can call on for support, distraction  4. Assessment of progress  a. Client will engage in assessment of their progress on a regular basis     Bipolar Disorder  Treatment plan:  1. Education- the Biopsychosocial model of Bipolar Disorder    a. Client will be able to describe in general terms what Bipolar Disorder  is and is not  b. Client will be able to describe how Bipolar Disorder  has affected their life in at least two different areas, such as school or work and Home/relationships  c. Clients parents/guardians or significant others will be provided information on what Bipolar Disorder  is and the ways it can affect relationships and be encouraged to be a part of clients treatment team.  2. Medication  a. Client will participate in medication evaluation for Bipolar Disorder  symptoms and follow medication recommendations.   3. Identification and management of triggers  a. Client and therapist will examine patient s history to determine if there are predictable triggers for manic or depressive episodes (e.g. boredom, anger, family stress)  b. Client and therapist will develop means of diffusing these triggers (e.g. relaxation strategies, boundary setting, anger management)  4. Comorbid conditions   a. Client and therapist will asses for comorbid conditions (e.g. anxiety, depression, substance use). and add additional items to treatment plan as needed  5. Self-care  a. Client will identify 3 things they can do just for themselves  b. Client will take time for quiet, reflection,  meditation 3 times per week  c. Client will Learn to set boundaries when appropriate  d. Client will Identify 2 individuals they can call on for support, distraction  5. Behavioral Activation  a. Client will Identify two forms of exercise/activity and engage in them 3 times per week  b. Client will Identify 2 things that make them laugh, and engage in these 3 times per week.  c. Client will Identify 2 Creative activities or hobbies  and engage in them 2 times per week  d. Client will identify music, movies, books that make them feel good and use them 3 times per week  6. Assessment of progress  a. Client will engage in assessment of their progress on a regular basis     Client has reviewed and agreed to the above plan.        Kd Morris             7/24/2020

## 2021-12-15 DIAGNOSIS — M10.9 ACUTE GOUTY ARTHRITIS: ICD-10-CM

## 2021-12-15 RX ORDER — ALLOPURINOL 300 MG/1
TABLET ORAL
Qty: 90 TABLET | Refills: 1 | Status: SHIPPED | OUTPATIENT
Start: 2021-12-15 | End: 2022-06-06

## 2021-12-15 NOTE — TELEPHONE ENCOUNTER
Routing refill request to provider for review/approval because:  Labs not current:  Uric Acid > year    Dae Torres RN

## 2021-12-19 DIAGNOSIS — I89.0 LYMPHEDEMA OF BOTH LOWER EXTREMITIES: ICD-10-CM

## 2021-12-20 NOTE — TELEPHONE ENCOUNTER
Routing refill request to provider for review/approval because:  Drug not active on patient's medication list  Thank you.  Mundo Conde RN

## 2021-12-21 RX ORDER — CHOLECALCIFEROL (VITAMIN D3) 25 MCG
TABLET ORAL
Qty: 90 TABLET | Refills: 0 | Status: SHIPPED | OUTPATIENT
Start: 2021-12-21 | End: 2022-03-16

## 2021-12-22 ENCOUNTER — VIRTUAL VISIT (OUTPATIENT)
Dept: PSYCHOLOGY | Facility: CLINIC | Age: 56
End: 2021-12-22
Payer: COMMERCIAL

## 2021-12-22 DIAGNOSIS — F31.62 MODERATE MIXED BIPOLAR I DISORDER (H): Primary | ICD-10-CM

## 2021-12-22 PROCEDURE — 90834 PSYTX W PT 45 MINUTES: CPT | Mod: 95 | Performed by: PSYCHOLOGIST

## 2021-12-22 NOTE — PROGRESS NOTES
"                                           Progress Note  Disclaimer: Voice recognition software was used to generate this note. As a result, wrong word or 'sound-a-like' substitutions may have occurred due to the inherent limitations of voice recognition software. There may be errors in the script that have gone undetected. Please consider this when interpreting information found in this chart.     Patient Name: Bia Bill  Date: 12/22/21         Service Type: Individual      Session Start Time: 2:00PM  session End Time: 2:45PM  Session Length: 45    Session #: 83    Attendees: Client attended alone    Service Modality:  Phone Visit:    The patient has been notified of the following:      \"We have found that certain health care needs can be provided without the need for a face to face visit.  This service lets us provide the care you need with a phone conversation.       I will have full access to your [facility] medical record during this entire phone call.   I will be taking notes for your medical record.      Since this is like an office visit, we will bill your insurance company for this service.       There are potential benefits and risks of telephone visits (e.g. limits to patient confidentiality) that differ from in-person visits.?  Confidentiality still applies for telephone services, and nobody will record the visit.  It is important to be in a quiet, private space that is free of distractions (including cell phone or other devices) during the visit.??      If during the course of the call I believe a telephone visit is not appropriate, you will not be charged for this service\"     Consent has been obtained for this service by care team member: Yes      Treatment Plan Last Reviewed: 10/11/21  PHQ-9 / CHIN-7 : see flowsheets    DATA  Interactive Complexity: No    Crisis: No       Progress Since Last Session (Related to Symptoms / Goals / Homework):   Symptoms: Improving Exercise and medications " helping.    Homework: Achieved / completed to satisfaction      Episode of Care Goals: Satisfactory progress - MAINTENANCE (Working to maintain change, with risk of relapse); Intervened by continuing to positively reinforce healthy behavior choice   Will be doing [employer] to relieve stress during the holidays. Having family over for [place].     Not sleeping well dt not being able to hook O2 up to Bipap.     Treatment Objective(s) Addressed in This Session:   identify 2 strategies for more effectively managing Bipolar Disorder    Processing reaction to hospitalization and having to quit smoking.         Intervention:   CBT: behavioral activation  Support for recent trauma and subsequent need for life changes.      ASSESSMENT: Current Emotional / Mental Status (status of significant symptoms):   Risk status (Self / Other harm or suicidal ideation)   Patient denies current fears or concerns for personal safety.   Patient denies current or recent suicidal ideation or behaviors.   Patient denies current or recent homicidal ideation or behaviors.   Patient denies current or recent self injurious behavior or ideation.   Patient denies other safety concerns.   Patient reports there has been no change in risk factors since their last session.     Patient reports there has been no change in protective factors since their last session.     Recommended that patient call 911 or go to the local ED should there be a change in any of these risk factors.     Appearance:   Unable to assess    Eye Contact:   Unable to assess    Psychomotor Behavior: Unable to assess    Attitude:   Cooperative    Orientation:   All   Speech    Rate / Production: Normal     Volume:  Normal    Mood:    Grieving angry, resentful, all of the above.   Affect:    Appropriate    Thought Content:  Clear    Thought Form:  Coherent  Logical    Insight:    Good      Medication Review:   Changes to psychiatric medications, see updated Medication List in  EPIC.               Multiple changes, see hospital records.      Medication Compliance:   Yes     Changes in Health Issues:   Yes: Cardiac issues, Associated Psychological Distress     Chemical Use Review:   Substance Use: Chemical use reviewed, no active concerns identified      Tobacco Use: No current tobacco use.  Quit in hospital.     Diagnosis:  1. Moderate mixed bipolar I disorder (H)        Collateral Reports Completed:   Not Applicable    PLAN: (Patient Tasks / Therapist Tasks / Other)  Client to  Continue with physical therapy twice per week. Grieve as she needs to  All feelings are fair game.    Kd CAMMYDominique Morris                                                         ______________________________________________________________________       Treatment Plan     Client's Name: Bia Bill               YOB: 1965     Date: 10/11/21        DSM5 Diagnoses: (Sustained by DSM5 Criteria Listed Above)  Diagnoses: 296.40 Bipolar I Disorder Current or Most Recent Episode Manic, Unspecified  Psychosocial & Contextual Factors: financial difficulties, chronic pain, roommate issues  WHODAS 2.0 (12 item)               This questionnaire asks about difficulties due to health conditions. Health conditions             include disease or illnesses, other health problems that may be short or long lasting,             injuries, mental health or emotional problems, and problems with alcohol or drugs.                         Think back over the past 30 days and answer these questions, thinking about how much             difficulty you had doing the following activities. For each question, please [place] only             one response.      S1  Standing for long periods such as 30 minutes?  Mild =           2    S2  Taking care of household responsibilities?  Moderate =   3    S3  Learning a new task, for example, learning how to get to a new place?  Mild =           2    S4  How much of a problem do you have  joining community activities (for example, festivals, [place]ze or other activities) in the same way as anyone else can?  Moderate =   3    S5  How much have you been emotionally affected by your health problems?  Mild =           2             In the past 30 days, how much difficulty did you have in:    S6  Concentrating on doing something for ten minutes?  Mild =           2    S7  Walking a long distance such as a kilometer (or equivalent)?  Severe =       4    S8  Washing your whole body?  None =         1    S9  Getting dressed?  None =         1    S10  Dealing with people you do not know?  Moderate =   3    S11  Maintaining a friendship?  Severe =       4    S12  Your day to day work?  Moderate =   3       H1  Overall, in the past 30 days, how many days were these difficulties present?  Record number of days seven    H2  In the past 30 days, for how many days were you totally unable to carry out your usual activities or work because of any health condition?  Record number of days  seven    H3  In the past 30 days, not counting the days that you were totally unable, for how many days did you cut back or reduce your usual activities or work because of any health condition?  Record number of days five                      Referral / Collaboration:  Referral to another professional/service is not indicated at this time..     Anticipated number of session or this episode of care: 60     Goals  1. Education- the Biopsychosocial model of depression  a. Client will be able to describe how depression is effecting them physically, emotionally and socially  2. Behavioral Activation  a. Client will learn to assess their depression on a day to day basis  b. Client will Identify two forms of exercise/activity and engage in them 3 times per week  c. Client will Identify 3 things that make them laugh, and engage in these 5 times per week.  d. Client will Identify 1-2Creative activities or hobbies  and engage in them 2 times  per week  e. Client will identify music, movies, books that make them feel good and use them 3-4 times per week  3. Self-care  a. Client will identify 5 things they can do just for themselves  b. Client will take time for quiet, reflection, meditation 5 times per week  c. Client will Learn to set boundaries when appropriate  d. Client will Identify 2 individuals they can call on for support, distraction  4. Assessment of progress  a. Client will engage in assessment of their progress on a regular basis     Bipolar Disorder  Treatment plan:  1. Education- the Biopsychosocial model of Bipolar Disorder    a. Client will be able to describe in general terms what Bipolar Disorder  is and is not  b. Client will be able to describe how Bipolar Disorder  has affected their life in at least two different areas, such as school or work and Home/relationships  c. Clients parents/guardians or significant others will be provided information on what Bipolar Disorder  is and the ways it can affect relationships and be encouraged to be a part of clients treatment team.  2. Medication  a. Client will participate in medication evaluation for Bipolar Disorder  symptoms and follow medication recommendations.   3. Identification and management of triggers  a. Client and therapist will examine patient s history to determine if there are predictable triggers for manic or depressive episodes (e.g. boredom, anger, family stress)  b. Client and therapist will develop means of diffusing these triggers (e.g. relaxation strategies, boundary setting, anger management)  4. Comorbid conditions   a. Client and therapist will asses for comorbid conditions (e.g. anxiety, depression, substance use). and add additional items to treatment plan as needed  5. Self-care  a. Client will identify 3 things they can do just for themselves  b. Client will take time for quiet, reflection, meditation 3 times per week  c. Client will Learn to set boundaries when  appropriate  d. Client will Identify 2 individuals they can call on for support, distraction  5. Behavioral Activation  a. Client will Identify two forms of exercise/activity and engage in them 3 times per week  b. Client will Identify 2 things that make them laugh, and engage in these 3 times per week.  c. Client will Identify 2 Creative activities or hobbies  and engage in them 2 times per week  d. Client will identify music, movies, books that make them feel good and use them 3 times per week  6. Assessment of progress  a. Client will engage in assessment of their progress on a regular basis     Client has reviewed and agreed to the above plan.        Kd Morris             7/24/2020

## 2022-01-13 ENCOUNTER — VIRTUAL VISIT (OUTPATIENT)
Dept: PSYCHOLOGY | Facility: CLINIC | Age: 57
End: 2022-01-13
Payer: COMMERCIAL

## 2022-01-13 DIAGNOSIS — F31.62 MODERATE MIXED BIPOLAR I DISORDER (H): Primary | ICD-10-CM

## 2022-01-13 PROCEDURE — 90834 PSYTX W PT 45 MINUTES: CPT | Mod: 95 | Performed by: PSYCHOLOGIST
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
## 2022-02-04 ENCOUNTER — VIRTUAL VISIT (OUTPATIENT)
Dept: PSYCHOLOGY | Facility: CLINIC | Age: 57
End: 2022-02-04
Payer: COMMERCIAL

## 2022-02-04 DIAGNOSIS — F31.62 MODERATE MIXED BIPOLAR I DISORDER (H): Primary | ICD-10-CM

## 2022-02-04 PROCEDURE — 90834 PSYTX W PT 45 MINUTES: CPT | Mod: 95 | Performed by: PSYCHOLOGIST
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
## 2022-02-14 DIAGNOSIS — E11.9 TYPE 2 DIABETES MELLITUS WITHOUT COMPLICATION, WITHOUT LONG-TERM CURRENT USE OF INSULIN (H): ICD-10-CM

## 2022-02-14 DIAGNOSIS — E78.00 HYPERCHOLESTEREMIA: ICD-10-CM

## 2022-02-14 DIAGNOSIS — K21.9 GASTROESOPHAGEAL REFLUX DISEASE WITHOUT ESOPHAGITIS: ICD-10-CM

## 2022-02-14 RX ORDER — SIMVASTATIN 20 MG
TABLET ORAL
Qty: 30 TABLET | Refills: 6 | Status: SHIPPED | OUTPATIENT
Start: 2022-02-14 | End: 2022-08-31

## 2022-02-14 RX ORDER — METFORMIN HCL 500 MG
TABLET, EXTENDED RELEASE 24 HR ORAL
Qty: 180 TABLET | Refills: 1 | Status: SHIPPED | OUTPATIENT
Start: 2022-02-14 | End: 2022-05-18

## 2022-02-14 NOTE — TELEPHONE ENCOUNTER
Routing refill request to provider for review/approval because:  Patient needs to be seen because:  Overdue for annual exam and diabetic check    Dae Torres RN

## 2022-02-17 ENCOUNTER — VIRTUAL VISIT (OUTPATIENT)
Dept: PSYCHOLOGY | Facility: CLINIC | Age: 57
End: 2022-02-17
Payer: COMMERCIAL

## 2022-02-17 DIAGNOSIS — F31.62 MODERATE MIXED BIPOLAR I DISORDER (H): Primary | ICD-10-CM

## 2022-02-17 PROCEDURE — 90832 PSYTX W PT 30 MINUTES: CPT | Mod: 95 | Performed by: PSYCHOLOGIST

## 2022-02-17 NOTE — PROGRESS NOTES
"                                           Progress Note  Disclaimer: Voice recognition software was used to generate this note. As a result, wrong word or 'sound-a-like' substitutions may have occurred due to the inherent limitations of voice recognition software. There may be errors in the script that have gone undetected. Please consider this when interpreting information found in this chart.     Patient Name: Bia Bill  Date: 2/17/22         Service Type: Individual      Session Start Time: 3:00PM  session End Time: 3:30PM  Session Length: 30    Session #: 86    Attendees: Client attended alone    Service Modality:  Phone Visit:    The patient has been notified of the following:      \"We have found that certain health care needs can be provided without the need for a face to face visit.  This service lets us provide the care you need with a phone conversation.       I will have full access to your [place] medical record during this entire phone call.   I will be taking notes for your medical record.      Since this is like an office visit, we will bill your insurance company for this service.       There are potential benefits and risks of telephone visits (e.g. limits to patient confidentiality) that differ from in-person visits.?  Confidentiality still applies for telephone services, and nobody will record the visit.  It is important to be in a quiet, private space that is free of distractions (including cell phone or other devices) during the visit.??      If during the course of the call I believe a telephone visit is not appropriate, you will not be charged for this service\"     Consent has been obtained for this service by care team member: Yes      Treatment Plan Last Reviewed: 10/11/21  PHQ-9 / CHIN-7 : see flowsheets    DATA  Interactive Complexity: No    Crisis: No       Progress Since Last Session (Related to Symptoms / Goals / Homework):   Symptoms: Improving Exercise and medications " helping.    Homework: Achieved / completed to satisfaction      Episode of Care Goals: Satisfactory progress - ACTION (Actively working towards change); Intervened by reinforcing change plan / affirming steps taken    Messed up her left thumb. Quite painful and in a brace. Going to [facility] tomorrow for neuropsych testing.      Treatment Objective(s) Addressed in This Session:   identify 2 strategies for more effectively managing Bipolar Disorder    Review and process traumatic memories       Intervention:   CBT: behavioral activation  Support for recent trauma and subsequent need for life changes.      ASSESSMENT: Current Emotional / Mental Status (status of significant symptoms):   Risk status (Self / Other harm or suicidal ideation)   Patient denies current fears or concerns for personal safety.   Patient denies current or recent suicidal ideation or behaviors.   Patient denies current or recent homicidal ideation or behaviors.   Patient denies current or recent self injurious behavior or ideation.   Patient denies other safety concerns.   Patient reports there has been no change in risk factors since their last session.     Patient reports there has been no change in protective factors since their last session.     Recommended that patient call 911 or go to the local ED should there be a change in any of these risk factors.     Appearance:   Unable to assess    Eye Contact:   Unable to assess    Psychomotor Behavior: Unable to assess    Attitude:   Cooperative    Orientation:   All   Speech    Rate / Production: Normal     Volume:  Normal    Mood:    Frustrated, anxious   Affect:    Appropriate    Thought Content:  Clear    Thought Form:  Coherent  Logical    Insight:    Good      Medication Review:   Changes to psychiatric medications, see updated Medication List in EPIC.               Multiple changes, see hospital records.      Medication Compliance:   Yes     Changes in Health Issues:   Yes: Cardiac issues,  Associated Psychological Distress     Chemical Use Review:   Substance Use: Chemical use reviewed, no active concerns identified      Tobacco Use: No current tobacco use.  Quit in hospital.     Diagnosis:  1. Moderate mixed bipolar I disorder (H)        Collateral Reports Completed:   Not Applicable    PLAN: (Patient Tasks / Therapist Tasks / Other)  Client to consult with PCP and psychiatry as necessary.    Kd Morris                                                         ______________________________________________________________________       Treatment Plan     Client's Name: Bia Bill               YOB: 1965     Date: 10/11/21        DSM5 Diagnoses: (Sustained by DSM5 Criteria Listed Above)  Diagnoses: 296.40 Bipolar I Disorder Current or Most Recent Episode Manic, Unspecified  Psychosocial & Contextual Factors: financial difficulties, chronic pain, roommate issues  WHODAS 2.0 (12 item)               This questionnaire asks about difficulties due to health conditions. Health conditions             include disease or illnesses, other health problems that may be short or long lasting,             injuries, mental health or emotional problems, and problems with alcohol or drugs.                         Think back over the past 30 days and answer these questions, thinking about how much             difficulty you had doing the following activities. For each question, please [place] only             one response.      S1  Standing for long periods such as 30 minutes?  Mild =           2    S2  Taking care of household responsibilities?  Moderate =   3    S3  Learning a new task, for example, learning how to get to a new place?  Mild =           2    S4  How much of a problem do you have joining community activities (for example, festivals, [employer] or other activities) in the same way as anyone else can?  Moderate =   3    S5  How much have you been emotionally affected by your health  problems?  Mild =           2             In the past 30 days, how much difficulty did you have in:    S6  Concentrating on doing something for ten minutes?  Mild =           2    S7  Walking a long distance such as a kilometer (or equivalent)?  Severe =       4    S8  Washing your whole body?  None =         1    S9  Getting dressed?  None =         1    S10  Dealing with people you do not know?  Moderate =   3    S11  Maintaining a friendship?  Severe =       4    S12  Your day to day work?  Moderate =   3       H1  Overall, in the past 30 days, how many days were these difficulties present?  Record number of days seven    H2  In the past 30 days, for how many days were you totally unable to carry out your usual activities or work because of any health condition?  Record number of days  seven    H3  In the past 30 days, not counting the days that you were totally unable, for how many days did you cut back or reduce your usual activities or work because of any health condition?  Record number of days five                      Referral / Collaboration:  Referral to another professional/service is not indicated at this time..     Anticipated number of session or this episode of care: 60     Goals  1. Education- the Biopsychosocial model of depression  a. Client will be able to describe how depression is effecting them physically, emotionally and socially  2. Behavioral Activation  a. Client will learn to assess their depression on a day to day basis  b. Client will Identify two forms of exercise/activity and engage in them 3 times per week  c. Client will Identify 3 things that make them laugh, and engage in these 5 times per week.  d. Client will Identify 1-2Creative activities or hobbies  and engage in them 2 times per week  e. Client will identify music, movies, books that make them feel good and use them 3-4 times per week  3. Self-care  a. Client will identify 5 things they can do just for themselves  b. Client  will take time for quiet, reflection, meditation 5 times per week  c. Client will Learn to set boundaries when appropriate  d. Client will Identify 2 individuals they can call on for support, distraction  4. Assessment of progress  a. Client will engage in assessment of their progress on a regular basis     Bipolar Disorder  Treatment plan:  1. Education- the Biopsychosocial model of Bipolar Disorder    a. Client will be able to describe in general terms what Bipolar Disorder  is and is not  b. Client will be able to describe how Bipolar Disorder  has affected their life in at least two different areas, such as school or work and Home/relationships  c. Clients parents/guardians or significant others will be provided information on what Bipolar Disorder  is and the ways it can affect relationships and be encouraged to be a part of clients treatment team.  2. Medication  a. Client will participate in medication evaluation for Bipolar Disorder  symptoms and follow medication recommendations.   3. Identification and management of triggers  a. Client and therapist will examine patient s history to determine if there are predictable triggers for manic or depressive episodes (e.g. boredom, anger, family stress)  b. Client and therapist will develop means of diffusing these triggers (e.g. relaxation strategies, boundary setting, anger management)  4. Comorbid conditions   a. Client and therapist will asses for comorbid conditions (e.g. anxiety, depression, substance use). and add additional items to treatment plan as needed  5. Self-care  a. Client will identify 3 things they can do just for themselves  b. Client will take time for quiet, reflection, meditation 3 times per week  c. Client will Learn to set boundaries when appropriate  d. Client will Identify 2 individuals they can call on for support, distraction  5. Behavioral Activation  a. Client will Identify two forms of exercise/activity and engage in them 3 times per  week  b. Client will Identify 2 things that make them laugh, and engage in these 3 times per week.  c. Client will Identify 2 Creative activities or hobbies  and engage in them 2 times per week  d. Client will identify music, movies, books that make them feel good and use them 3 times per week  6. Assessment of progress  a. Client will engage in assessment of their progress on a regular basis     Client has reviewed and agreed to the above plan.        Kd Morris             7/24/2020

## 2022-02-28 NOTE — PROGRESS NOTES
ANTICOAGULATION FOLLOW-UP CLINIC VISIT    Patient Name:  Bia Bill  Date:  9/4/2018  Contact Type:  Telephone    SUBJECTIVE:     Patient Findings     Positives No Problem Findings    Comments Patient denies any changes. Patient will continue weekly maintenance dose. INR is therapeutic.              OBJECTIVE    INR   Date Value Ref Range Status   09/04/2018 2.51 (H) 0.86 - 1.14 Final     Comment:     Special tube used to correct for high hematocrit       ASSESSMENT / PLAN  INR assessment THER    Recheck INR In: 4 WEEKS    INR Location Outside lab      Anticoagulation Summary as of 9/4/2018     INR goal 2.0-2.5   Today's INR 2.51!   Warfarin maintenance plan 10 mg (5 mg x 2) on Mon, Wed, Fri; 12.5 mg (5 mg x 2.5) all other days   Full warfarin instructions 10 mg on Mon, Wed, Fri; 12.5 mg all other days   Weekly warfarin total 80 mg   Plan last modified Angelina jN RN (7/12/2018)   Next INR check 10/5/2018   Priority INR   Target end date Indefinite    Indications   Long term current use of anticoagulant therapy [Z79.01]  DVT (deep venous thrombosis) (H) [I82.409]  PVD (peripheral vascular disease) with claudication (H) [I73.9]         Anticoagulation Episode Summary     INR check location     Preferred lab     Send INR reminders to [facility]    Comments * lab draw due to elevated hematocrit (fingerstick meters don't work). LEFT LE. STENT x 3 LEFT UPPER LEG. Previous arterial clot. New goal range 2-2.5 starting 11/6/17.      Anticoagulation Care Providers     Provider Role Specialty Phone number    Kd Leong MD [facility] [phone]            See the Encounter Report to view Anticoagulation Flowsheet and Dosing Calendar (Go to Encounters tab in chart review, and find the Anticoagulation Therapy Visit)        Reina Forrest, RN                None or intercostal

## 2022-03-16 DIAGNOSIS — I89.0 LYMPHEDEMA OF BOTH LOWER EXTREMITIES: ICD-10-CM

## 2022-03-16 RX ORDER — CHOLECALCIFEROL (VITAMIN D3) 25 MCG
TABLET ORAL
Qty: 90 TABLET | Refills: 0 | Status: SHIPPED | OUTPATIENT
Start: 2022-03-16 | End: 2022-06-06

## 2022-03-16 NOTE — TELEPHONE ENCOUNTER
Routing refill request to provider for review/approval because:  Patient needs to be seen because it has been more than 1 year since last office visit.    Dae Torres RN

## 2022-03-22 ENCOUNTER — VIRTUAL VISIT (OUTPATIENT)
Dept: PSYCHOLOGY | Facility: CLINIC | Age: 57
End: 2022-03-22
Payer: COMMERCIAL

## 2022-03-22 DIAGNOSIS — F31.62 MODERATE MIXED BIPOLAR I DISORDER (H): Primary | ICD-10-CM

## 2022-03-22 PROCEDURE — 90834 PSYTX W PT 45 MINUTES: CPT | Mod: 95 | Performed by: PSYCHOLOGIST

## 2022-03-22 NOTE — PROGRESS NOTES
"    [facility]                                     Progress Note    Disclaimer: Voice recognition software was used to generate this note. As a result, wrong word or 'sound-a-like' substitutions may have occurred due to the inherent limitations of voice recognition software. There may be errors in the script that have gone undetected. Please consider this when interpreting information found in this chart.     Patient Name: Bia Bill  Date: 3/22/22         Service Type: Individual      Session Start Time: 12:00PM  Session End Time: 12:45     Session Length: 45    Session #: 87    Attendees: Client attended alone    Service Modality:  Phone Visit:      Provider verified identity through the following two step process.  Patient provided:  Patient is known previously to provider    The patient has been notified of the following:      \"We have found that certain health care needs can be provided without the need for a face to face visit.  This service lets us provide the care you need with a phone conversation.       I will have full access to your [facility] medical record during this entire phone call.   I will be taking notes for your medical record.      Since this is like an office visit, we will bill your insurance company for this service.       There are potential benefits and risks of telephone visits (e.g. limits to patient confidentiality) that differ from in-person visits.?Confidentiality still applies for telephone services, and nobody will record the visit.  It is important to be in a quiet, private space that is free of distractions (including cell phone or other devices) during the visit.??      If during the course of the call I believe a telephone visit is not appropriate, you will not be charged for this service\"     Consent has been obtained for this service by care team member: Yes     DATA  Interactive Complexity: No  Crisis: No        Progress Since Last Session " (Related to Symptoms / Goals / Homework):   Symptoms: Improving taking care of her diabetes.    Homework: Achieved / completed to satisfaction      Episode of Care Goals: Satisfactory progress - MAINTENANCE (Working to maintain change, with risk of relapse); Intervened by continuing to positively reinforce healthy behavior choice      Current / Ongoing Stressors and Concerns:   Smoke free for 5 months. Food is tasting better.  Has been exercising more, lost 8# thus far.  Supported her autonomy.      Treatment Objective(s) Addressed in This Session:   identify 2 strategies for more effectively managing Bipolar Disorder     Review and process traumatic memories        Intervention:    CBT: behavioral activation  Support for recent trauma and subsequent need for life changes.       Assessments completed prior to visit:  The following assessments were completed by patient for this visit:  PHQ9:   PHQ-9 SCORE 11/7/2019 11/20/2019 1/6/2020 1/30/2020 2/18/2020 2/28/2020 3/17/2020   PHQ-9 Total Score - - - - - - -   PHQ-9 Total Score 11 13 10 11 9 8 8     GAD7:   CHIN-7 SCORE 10/1/2019 11/7/2019 11/20/2019 1/6/2020 2/18/2020 2/28/2020 3/17/2020   Total Score - - - - - - -   Total Score 9 10 10 8 7 5 7     CAGE-AID: No flowsheet data found.  PROMIS 10-Global Health (all questions and answers displayed): No flowsheet data found.  [place] Suicide Severity Rating Scale (Lifetime/Recent)  [place] Suicide Severity Rating (Lifetime/Recent) 12/18/2018 1/4/2019 2/28/2020   Wish to be Dead (Lifetime) - - Yes   Comments - - (No Data)   Non-Specific Active Suicidal Thoughts (Lifetime) - - Yes   Non-Specific Active Suicidal Thought Description (Lifetime) - - (No Data)   Most Severe Ideation Rating (Lifetime) - - 5   Most Severe Ideation Description (Lifetime) - - (No Data)   Frequency (Lifetime) - - 3   Duration (Lifetime) - - 4   Controllability (Lifetime) - - 0   Protective Factors  (Lifetime) - - 5   Reasons for Ideation (Lifetime)  - - 1   Q1 Wished to be Dead (Past Month) no no -   Q2 Suicidal Thoughts (Past Month) no no -   Q6 Suicide Behavior (Lifetime) no no -   RETIRED: 1. Wish to be Dead (Recent) - - No   RETIRED: 2. Non-Specific Active Suicidal Thoughts (Recent) - - No   3. Active Suicidal Ideation with any Methods (Not Plan) Without Intent to Act (Lifetime) - - Yes   RETIRE: Active Suicidal Ideation with any Methods (Not Plan) Description (Lifetime) - - (No Data)   Comments - - See above   RETIRED: 3. Active Suicidal Ideation with any Methods (Not Plan) Without Intent to Act (Recent) - - No   RETIRE: 4. Active Suicidal Ideation with Some Intent to Act, Without Specific Plan (Lifetime) - - Yes   4. Active Suicidal Ideation with Some Intent to Act, Without Specific Plan (Recent) - - No   RETIRE: 5. Active Suicidal Ideation with Specific Plan and Intent (Lifetime) - - Yes   Active Suicidal Ideation with Specific Plan and Intent Description (Lifetime) - - (No Data)   Comments - - Cutting   RETIRED: 5. Active Suicidal Ideation with Specific Plan and Intent (Recent) - - No   Most Severe Ideation Rating (Past Month) - - NA   Most Severe Ideation Description (Past Month) - - (No Data)   Comments - - None   Frequency (Past Month) - - NA   Duration (Past Month) - - NA   Controllability (Past Month) - - NA   Protective Factors (Past Month) - - NA   Reasons for Ideation (Past Month) - - NA   Actual Attempt (Lifetime) - - Yes   Actual Attempt Description (Lifetime) - - (No Data)   Comments - - Meds for suicide attempts 17, 14, 8 years ago all by cutting   Total Number of Actual Attempts (Lifetime) - - (No Data)   Comments - - 3   Actual Attempt (Past 3 Months) - - No   Has subject engaged in non-suicidal self-injurious behavior? (Lifetime) - - Yes   Has subject engaged in non-suicidal self-injurious behavior? (Past 3 Months) - - No   Interrupted Attempts (Lifetime) - - Yes   Total Number of Interrupted Attempts (Lifetime) - - (No Data)   Comments -  - 1   Interrupted Attempts (Past 3 Months) - - No   Aborted or Self-Interrupted Attempt (Lifetime) - - Yes   Aborted or Self Interrupted Attempt Description (Lifetime) - - (No Data)   Comments - - Decided to stick around for her daughter   Total Number Aborted or Self Interrupted Attempts (Lifetime) - - (No Data)   Comments - - 1   Aborted or Self-Interrupted Attempt (Past 3 Months) - - No   Preparatory Acts or Behavior (Lifetime) - - Yes   Preparatory Acts or Behavior Description (Lifetime) - - (No Data)   Comments - - Finding sharp instruments for cutting.   Preparatory Acts or Behavior (Past 3 Months) - - No   Most Recent Attempt Date - - (No Data)   Comments - - 8 years ago   Most Recent Attempt Actual Lethality Code - - 2   Most Lethal Attempt Actual Lethality Code - - 2   Initial/First Attempt Date - - (No Data)   Comments - - 17 years ago   Initial/First Attempt Actual Lethality Code - - 2         ASSESSMENT: Current Emotional / Mental Status (status of significant symptoms):   Risk status (Self / Other harm or suicidal ideation)   Patient denies current fears or concerns for personal safety.   Patient denies current or recent suicidal ideation or behaviors.   Patient denies current or recent homicidal ideation or behaviors.   Patient denies current or recent self injurious behavior or ideation.   Patient denies other safety concerns.   Patient reports there has been no change in risk factors since their last session.     Patient reports there has been no change in protective factors since their last session.     Recommended that patient call 911 or go to the local ED should there be a change in any of these risk factors.     Appearance:   Unable to assess    Eye Contact:   Unable to assess    Psychomotor Behavior: Unable to assess    Attitude:   Cooperative  Interested   Orientation:   All   Speech    Rate / Production: Normal/ Responsive Normal     Volume:  Normal    Mood:    Normal   Affect:    Appropriate     Thought Content:  Clear    Thought Form:  Coherent  Logical    Insight:    Good      Medication Review:   Changes to psychiatric medications, see updated Medication List in EPIC.      Medication Compliance:   Yes     Changes in Health Issues:   Five weeks smoke free     Chemical Use Review:   Substance Use: Chemical use reviewed, no active concerns identified      Tobacco Use: No current tobacco use.      Diagnosis:  1. Moderate mixed bipolar I disorder (H)        Collateral Reports Completed:   Not Applicable    PLAN: (Patient Tasks / Therapist Tasks / Other)  Client to consult with PCP and psychiatry as necessary.        Kd Morris                                                         ______________________________________________________________________    Individual Treatment Plan    Patient's Name: Bia Bill  YOB: 1965    Date of Creation: 10/11/21  Date Treatment Plan Last  Reviewed/Revised: 3/22/22    DSM5 Diagnoses: Diagnoses: 296.40 Bipolar I Disorder Current or Most Recent Episode Manic, Unspecified  Psychosocial & Contextual Factors: financial difficulties, chronic pain    PROMIS (reviewed every 90 days):     Referral / Collaboration:  Referral to another professional/service is not indicated at this time..    Anticipated number of session for this episode of care: 60  Anticipation frequency of session: Monthly  Anticipated Duration of each session: 38-52 minutes  Treatment plan will be reviewed in 90 days or when goals have been changed.       Bipolar Disorder  Treatment plan:  1. Education- the Biopsychosocial model of Bipolar Disorder    a. Client will be able to describe in general terms what Bipolar Disorder  is and is not  b. Client will be able to describe how Bipolar Disorder  has affected their life in at least two different areas, such as school or work and Home/relationships  c. Clients parents/guardians or significant others will be provided information on what  Bipolar Disorder  is and the ways it can affect relationships and be encouraged to be a part of clients treatment team.  2. Medication  a. Client will participate in medication evaluation for Bipolar Disorder  symptoms and follow medication recommendations.   3. Identification and management of triggers  a. Client and therapist will examine patient s history to determine if there are predictable triggers for manic or depressive episodes (e.g. boredom, anger, family stress)  b. Client and therapist will develop means of diffusing these triggers (e.g. relaxation strategies, boundary setting, anger management)  4. Comorbid conditions   a. Client and therapist will asses for comorbid conditions (e.g. anxiety, depression, substance use). and add additional items to treatment plan as needed  5. Self-care  a. Client will identify 3 things they can do just for themselves  b. Client will take time for quiet, reflection, meditation 3 times per week  c. Client will Learn to set boundaries when appropriate  d. Client will Identify 2 individuals they can call on for support, distraction  1. Behavioral Activation  a. Client will Identify two forms of exercise/activity and engage in them 3 times per week  b. Client will Identify 2 things that make them laugh, and engage in these 3 times per week.  c. Client will Identify 2 Creative activities or hobbies  and engage in them 2 times per week  d. Client will identify music, movies, books that make them feel good and use them 3 times per week  2. Assessment of progress  a. Client will engage in assessment of their progress on a regular basis  Patient has reviewed and agreed to the above plan.      Kd Morris  March 22, 2022

## 2022-04-22 ENCOUNTER — VIRTUAL VISIT (OUTPATIENT)
Dept: PSYCHOLOGY | Facility: CLINIC | Age: 57
End: 2022-04-22
Payer: COMMERCIAL

## 2022-04-22 DIAGNOSIS — F31.62 MODERATE MIXED BIPOLAR I DISORDER (H): Primary | ICD-10-CM

## 2022-04-22 PROCEDURE — 90834 PSYTX W PT 45 MINUTES: CPT | Mod: 95 | Performed by: PSYCHOLOGIST
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
## 2022-05-17 DIAGNOSIS — E11.9 TYPE 2 DIABETES MELLITUS WITHOUT COMPLICATION, WITHOUT LONG-TERM CURRENT USE OF INSULIN (H): ICD-10-CM

## 2022-05-17 NOTE — TELEPHONE ENCOUNTER
Routing refill request to provider for review/approval because:  Patient needs to be seen because it has been more than 1 year since last office visit.; had a virtual visit with Dr. Leong on 2/17/21. Chart indicates that patient has seen many Providers over the past year.  Not sure if Dr. Leong is PCP now.  Thank you.  Mundo Conde RN

## 2022-05-18 RX ORDER — METFORMIN HCL 500 MG
TABLET, EXTENDED RELEASE 24 HR ORAL
Qty: 180 TABLET | Refills: 1 | Status: SHIPPED | OUTPATIENT
Start: 2022-05-18 | End: 2022-08-15

## 2022-05-26 ENCOUNTER — VIRTUAL VISIT (OUTPATIENT)
Dept: PSYCHOLOGY | Facility: CLINIC | Age: 57
End: 2022-05-26
Payer: COMMERCIAL

## 2022-05-26 DIAGNOSIS — F31.62 MODERATE MIXED BIPOLAR I DISORDER (H): Primary | ICD-10-CM

## 2022-05-26 PROCEDURE — 90834 PSYTX W PT 45 MINUTES: CPT | Mod: 95 | Performed by: PSYCHOLOGIST
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
## 2022-06-02 DIAGNOSIS — M10.9 ACUTE GOUTY ARTHRITIS: ICD-10-CM

## 2022-06-02 DIAGNOSIS — I89.0 LYMPHEDEMA OF BOTH LOWER EXTREMITIES: ICD-10-CM

## 2022-06-06 RX ORDER — ALLOPURINOL 300 MG/1
TABLET ORAL
Qty: 90 TABLET | Refills: 1 | Status: SHIPPED | OUTPATIENT
Start: 2022-06-06 | End: 2022-11-18

## 2022-06-06 RX ORDER — CHOLECALCIFEROL (VITAMIN D3) 25 MCG
TABLET ORAL
Qty: 90 TABLET | Refills: 0 | Status: SHIPPED | OUTPATIENT
Start: 2022-06-06 | End: 2022-08-31

## 2022-08-03 ENCOUNTER — VIRTUAL VISIT (OUTPATIENT)
Dept: PSYCHOLOGY | Facility: CLINIC | Age: 57
End: 2022-08-03
Payer: COMMERCIAL

## 2022-08-03 DIAGNOSIS — F31.62 MODERATE MIXED BIPOLAR I DISORDER (H): Primary | ICD-10-CM

## 2022-08-03 PROCEDURE — 90834 PSYTX W PT 45 MINUTES: CPT | Mod: 95 | Performed by: PSYCHOLOGIST

## 2022-08-03 NOTE — PROGRESS NOTES
"    [facility]                                     Progress Note    Disclaimer: Voice recognition software was used to generate this note. As a result, wrong word or 'sound-a-like' substitutions may have occurred due to the inherent limitations of voice recognition software. There may be errors in the script that have gone undetected. Please consider this when interpreting information found in this chart.     Patient Name: Bia Bill  Date: 8/3/22         Service Type: Individual      Session Start Time: 4:00PM  Session End Time: 4:45PM     Session Length: 45    Session #: 90  There has been demonstrated improvement in functioning while patient has been engaged in psychotherapy/psychological service- if withdrawn the patient would deteriorate and/or relapse.     Attendees: Client attended alone    Service Modality:  Phone Visit:      Provider verified identity through the following two step process.  Patient provided:  Patient is known previously to provider    The patient has been notified of the following:      \"We have found that certain health care needs can be provided without the need for a face to face visit.  This service lets us provide the care you need with a phone conversation.       I will have full access to your [facility] medical record during this entire phone call.   I will be taking notes for your medical record.      Since this is like an office visit, we will bill your insurance company for this service.       There are potential benefits and risks of telephone visits (e.g. limits to patient confidentiality) that differ from in-person visits.?Confidentiality still applies for telephone services, and nobody will record the visit.  It is important to be in a quiet, private space that is free of distractions (including cell phone or other devices) during the visit.??      If during the course of the call I believe a telephone visit is not appropriate, you will not be " "charged for this service\"     Consent has been obtained for this service by care team member: Yes     DATA  Interactive Complexity: No  Crisis: No        Progress Since Last Session (Related to Symptoms / Goals / Homework):   Symptoms: Improving taking care of her diabetes.    Homework: Achieved / completed to satisfaction      Episode of Care Goals: Satisfactory progress - MAINTENANCE (Working to maintain change, with risk of relapse); Intervened by continuing to positively reinforce healthy behavior choice      Current / Ongoing Stressors and Concerns:  Had to get a restraining order against neighbor who would come in to her apartment unannounced., cursing ant touching things.  will be given a copy. Got pro-mike  and court costs.   Doing PY for hand which was treated for carpal tunnel.  Has had Covid twice despite being vaccinated.   Thriving and surviving. Feels proud of herself for standing up for herself.  Daughter has quit smoking and drinking. Vapes now.      Treatment Objective(s) Addressed in This Session:   identify 2 strategies for more effectively managing Bipolar Disorder.     Review and process traumatic memories        Intervention:    CBT: behavioral activation  Support for recent trauma and subsequent need for life changes.       Assessments completed prior to visit:  The following assessments were completed by patient for this visit:  PHQ9:   PHQ-9 SCORE 11/7/2019 11/20/2019 1/6/2020 1/30/2020 2/18/2020 2/28/2020 3/17/2020   PHQ-9 Total Score - - - - - - -   PHQ-9 Total Score 11 13 10 11 9 8 8     GAD7:   CHIN-7 SCORE 10/1/2019 11/7/2019 11/20/2019 1/6/2020 2/18/2020 2/28/2020 3/17/2020   Total Score - - - - - - -   Total Score 9 10 10 8 7 5 7     CAGE-AID: No flowsheet data found.  PROMIS 10-Global Health (all questions and answers displayed): No flowsheet data found.  [place] Suicide Severity Rating Scale (Lifetime/Recent)  [place] Suicide Severity Rating " (Lifetime/Recent) 12/18/2018 1/4/2019 2/28/2020   Wish to be Dead (Lifetime) - - Yes   Comments - - (No Data)   Non-Specific Active Suicidal Thoughts (Lifetime) - - Yes   Non-Specific Active Suicidal Thought Description (Lifetime) - - (No Data)   Most Severe Ideation Rating (Lifetime) - - 5   Most Severe Ideation Description (Lifetime) - - (No Data)   Frequency (Lifetime) - - 3   Duration (Lifetime) - - 4   Controllability (Lifetime) - - 0   Protective Factors  (Lifetime) - - 5   Reasons for Ideation (Lifetime) - - 1   Q1 Wished to be Dead (Past Month) no no -   Q2 Suicidal Thoughts (Past Month) no no -   Q6 Suicide Behavior (Lifetime) no no -   RETIRED: 1. Wish to be Dead (Recent) - - No   RETIRED: 2. Non-Specific Active Suicidal Thoughts (Recent) - - No   3. Active Suicidal Ideation with any Methods (Not Plan) Without Intent to Act (Lifetime) - - Yes   RETIRE: Active Suicidal Ideation with any Methods (Not Plan) Description (Lifetime) - - (No Data)   Comments - - See above   RETIRED: 3. Active Suicidal Ideation with any Methods (Not Plan) Without Intent to Act (Recent) - - No   RETIRE: 4. Active Suicidal Ideation with Some Intent to Act, Without Specific Plan (Lifetime) - - Yes   4. Active Suicidal Ideation with Some Intent to Act, Without Specific Plan (Recent) - - No   RETIRE: 5. Active Suicidal Ideation with Specific Plan and Intent (Lifetime) - - Yes   Active Suicidal Ideation with Specific Plan and Intent Description (Lifetime) - - (No Data)   Comments - - Cutting   RETIRED: 5. Active Suicidal Ideation with Specific Plan and Intent (Recent) - - No   Most Severe Ideation Rating (Past Month) - - NA   Most Severe Ideation Description (Past Month) - - (No Data)   Comments - - None   Frequency (Past Month) - - NA   Duration (Past Month) - - NA   Controllability (Past Month) - - NA   Protective Factors (Past Month) - - NA   Reasons for Ideation (Past Month) - - NA   Actual Attempt (Lifetime) - - Yes   Actual  Attempt Description (Lifetime) - - (No Data)   Comments - - Meds for suicide attempts 17, 14, 8 years ago all by cutting   Total Number of Actual Attempts (Lifetime) - - (No Data)   Comments - - 3   Actual Attempt (Past 3 Months) - - No   Has subject engaged in non-suicidal self-injurious behavior? (Lifetime) - - Yes   Has subject engaged in non-suicidal self-injurious behavior? (Past 3 Months) - - No   Interrupted Attempts (Lifetime) - - Yes   Total Number of Interrupted Attempts (Lifetime) - - (No Data)   Comments - - 1   Interrupted Attempts (Past 3 Months) - - No   Aborted or Self-Interrupted Attempt (Lifetime) - - Yes   Aborted or Self Interrupted Attempt Description (Lifetime) - - (No Data)   Comments - - Decided to stick around for her daughter   Total Number Aborted or Self Interrupted Attempts (Lifetime) - - (No Data)   Comments - - 1   Aborted or Self-Interrupted Attempt (Past 3 Months) - - No   Preparatory Acts or Behavior (Lifetime) - - Yes   Preparatory Acts or Behavior Description (Lifetime) - - (No Data)   Comments - - Finding sharp instruments for cutting.   Preparatory Acts or Behavior (Past 3 Months) - - No   Most Recent Attempt Date - - (No Data)   Comments - - 8 years ago   Most Recent Attempt Actual Lethality Code - - 2   Most Lethal Attempt Actual Lethality Code - - 2   Initial/First Attempt Date - - (No Data)   Comments - - 17 years ago   Initial/First Attempt Actual Lethality Code - - 2         ASSESSMENT: Current Emotional / Mental Status (status of significant symptoms):   Risk status (Self / Other harm or suicidal ideation)   Patient denies current fears or concerns for personal safety.   Patient denies current or recent suicidal ideation or behaviors.   Patient denies current or recent homicidal ideation or behaviors.   Patient denies current or recent self injurious behavior or ideation.   Patient denies other safety concerns.   Patient reports there has been no change in risk factors  since their last session.     Patient reports there has been no change in protective factors since their last session.     Recommended that patient call 911 or go to the local ED should there be a change in any of these risk factors.     Appearance:   Unable to assess    Eye Contact:   Unable to assess    Psychomotor Behavior: Unable to assess    Attitude:   Cooperative  Interested   Orientation:   All   Speech    Rate / Production: Normal/ Responsive Normal     Volume:  Normal    Mood:    Normal   Affect:    Appropriate    Thought Content:  Clear    Thought Form:  Coherent  Logical    Insight:    Good      Medication Review:   Changes to psychiatric medications, see updated Medication List in EPIC.      Medication Compliance:   Yes     Changes in Health Issues:   Five weeks smoke free     Chemical Use Review:   Substance Use: Chemical use reviewed, no active concerns identified      Tobacco Use: No current tobacco use.      Diagnosis:  1. Moderate mixed bipolar I disorder (H)        Collateral Reports Completed:   Not Applicable    PLAN: (Patient Tasks / Therapist Tasks / Other)  Client to consult with PCP and psychiatry as necessary.        Kd Morris                                                         ______________________________________________________________________    Individual Treatment Plan    Patient's Name: Bia Bill  YOB: 1965    Date of Creation: 10/11/21  Date Treatment Plan Last  Reviewed/Revised: 8/3/22    DSM5 Diagnoses: Diagnoses: 296.40 Bipolar I Disorder Current or Most Recent Episode Manic, Unspecified  Psychosocial & Contextual Factors: financial difficulties, chronic pain    PROMIS (reviewed every 90 days):     Referral / Collaboration:  Referral to another professional/service is not indicated at this time..    Anticipated number of session for this episode of care: 60  Anticipation frequency of session: Monthly  Anticipated Duration of each session: 38-52  minutes  Treatment plan will be reviewed in 90 days or when goals have been changed.       Bipolar Disorder  Treatment plan:  1. Education- the Biopsychosocial model of Bipolar Disorder    a. Client will be able to describe in general terms what Bipolar Disorder  is and is not  b. Client will be able to describe how Bipolar Disorder  has affected their life in at least two different areas, such as school or work and Home/relationships  c. Clients parents/guardians or significant others will be provided information on what Bipolar Disorder  is and the ways it can affect relationships and be encouraged to be a part of clients treatment team.  2. Medication  a. Client will participate in medication evaluation for Bipolar Disorder  symptoms and follow medication recommendations.   3. Identification and management of triggers  a. Client and therapist will examine patient s history to determine if there are predictable triggers for manic or depressive episodes (e.g. boredom, anger, family stress)  b. Client and therapist will develop means of diffusing these triggers (e.g. relaxation strategies, boundary setting, anger management)  4. Comorbid conditions   a. Client and therapist will asses for comorbid conditions (e.g. anxiety, depression, substance use). and add additional items to treatment plan as needed  5. Self-care  a. Client will identify 3 things they can do just for themselves  b. Client will take time for quiet, reflection, meditation 3 times per week  c. Client will Learn to set boundaries when appropriate  d. Client will Identify 2 individuals they can call on for support, distraction  1. Behavioral Activation  a. Client will Identify two forms of exercise/activity and engage in them 3 times per week  b. Client will Identify 2 things that make them laugh, and engage in these 3 times per week.  c. Client will Identify 2 Creative activities or hobbies  and engage in them 2 times per week  d. Client will  identify music, movies, books that make them feel good and use them 3 times per week  2. Assessment of progress  a. Client will engage in assessment of their progress on a regular basis  Patient has reviewed and agreed to the above plan.      Kd Morris  March 22, 2022

## 2022-08-15 DIAGNOSIS — E11.9 TYPE 2 DIABETES MELLITUS WITHOUT COMPLICATION, WITHOUT LONG-TERM CURRENT USE OF INSULIN (H): ICD-10-CM

## 2022-08-15 RX ORDER — METFORMIN HCL 500 MG
TABLET, EXTENDED RELEASE 24 HR ORAL
Qty: 180 TABLET | Refills: 1 | Status: SHIPPED | OUTPATIENT
Start: 2022-08-15 | End: 2022-11-18

## 2022-08-15 NOTE — TELEPHONE ENCOUNTER
Routing refill request to provider for review/approval because:  Labs not current:  A1C overdue   Patient needs to be seen because it has been more than 1 year since last office visit.    Dae Torres RN

## 2022-08-24 ENCOUNTER — VIRTUAL VISIT (OUTPATIENT)
Dept: PSYCHOLOGY | Facility: CLINIC | Age: 57
End: 2022-08-24
Payer: COMMERCIAL

## 2022-08-24 DIAGNOSIS — F31.62 MODERATE MIXED BIPOLAR I DISORDER (H): Primary | ICD-10-CM

## 2022-08-24 PROCEDURE — 90834 PSYTX W PT 45 MINUTES: CPT | Mod: 95 | Performed by: PSYCHOLOGIST
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
## 2022-08-31 DIAGNOSIS — E78.00 HYPERCHOLESTEREMIA: ICD-10-CM

## 2022-08-31 DIAGNOSIS — I89.0 LYMPHEDEMA OF BOTH LOWER EXTREMITIES: ICD-10-CM

## 2022-08-31 RX ORDER — CHOLECALCIFEROL (VITAMIN D3) 25 MCG
TABLET ORAL
Qty: 90 TABLET | Refills: 0 | Status: SHIPPED | OUTPATIENT
Start: 2022-08-31 | End: 2022-11-18

## 2022-08-31 RX ORDER — SIMVASTATIN 20 MG
TABLET ORAL
Qty: 30 TABLET | Refills: 6 | Status: SHIPPED | OUTPATIENT
Start: 2022-08-31 | End: 2023-03-23

## 2022-08-31 NOTE — TELEPHONE ENCOUNTER
Patient called, looks like she is being seen in the [facility] clinic in [place]. Patient states she moved to [place], [place], she has not had a provider in [place] fill these yet.    Patient is told will send 30 day refill for now, she is advised to schedule appointment with new provider to manage the medications going forward, she is fine with that.      TAWANA Dawson

## 2022-09-14 ENCOUNTER — VIRTUAL VISIT (OUTPATIENT)
Dept: PSYCHOLOGY | Facility: CLINIC | Age: 57
End: 2022-09-14
Payer: COMMERCIAL

## 2022-09-14 DIAGNOSIS — F31.62 MODERATE MIXED BIPOLAR I DISORDER (H): Primary | ICD-10-CM

## 2022-09-14 PROCEDURE — 90834 PSYTX W PT 45 MINUTES: CPT | Mod: 95 | Performed by: PSYCHOLOGIST

## 2022-09-14 NOTE — PROGRESS NOTES
"    [facility]                                     Progress Note    Disclaimer: Voice recognition software was used to generate this note. As a result, wrong word or 'sound-a-like' substitutions may have occurred due to the inherent limitations of voice recognition software. There may be errors in the script that have gone undetected. Please consider this when interpreting information found in this chart.     Patient Name: Bia Bill  Date: 9/14/22         Service Type: Individual      Session Start Time: 2:00PM  Session End Time: 2:45PM     Session Length: 45    Session #: 92  There has been demonstrated improvement in functioning while patient has been engaged in psychotherapy/psychological service- if withdrawn the patient would deteriorate and/or relapse.     Attendees: Client attended alone    Service Modality:  Phone Visit:      Provider verified identity through the following two step process.  Patient provided:  Patient is known previously to provider    The patient has been notified of the following:      \"We have found that certain health care needs can be provided without the need for a face to face visit.  This service lets us provide the care you need with a phone conversation.       I will have full access to your [facility] medical record during this entire phone call.   I will be taking notes for your medical record.      Since this is like an office visit, we will bill your insurance company for this service.       There are potential benefits and risks of telephone visits (e.g. limits to patient confidentiality) that differ from in-person visits.?Confidentiality still applies for telephone services, and nobody will record the visit.  It is important to be in a quiet, private space that is free of distractions (including cell phone or other devices) during the visit.??      If during the course of the call I believe a telephone visit is not appropriate, you will not " "be charged for this service\"     Consent has been obtained for this service by care team member: Yes     DATA  Interactive Complexity: No  Crisis: No        Progress Since Last Session (Related to Symptoms / Goals / Homework):   Symptoms: Improving taking care of her diabetes.    Homework: Achieved / completed to satisfaction      Episode of Care Goals: Satisfactory progress - MAINTENANCE (Working to maintain change, with risk of relapse); Intervened by continuing to positively reinforce healthy behavior choice      Current / Ongoing Stressors and Concerns:  Most people in her building are not speaking to her dt lies spread by person she stood up to.   Sisters death has reduced her quilting but she continues other crafts. Daughter recently bought her a new sewing machine.      Treatment Objective(s) Addressed in This Session:   identify 2 strategies for more effectively managing Bipolar Disorder.     Review and process traumatic memories        Intervention:    CBT: behavioral activation  Support for recent trauma and subsequent need for life changes.       Assessments completed prior to visit:  The following assessments were completed by patient for this visit:  PHQ9:   PHQ-9 SCORE 11/7/2019 11/20/2019 1/6/2020 1/30/2020 2/18/2020 2/28/2020 3/17/2020   PHQ-9 Total Score - - - - - - -   PHQ-9 Total Score 11 13 10 11 9 8 8     GAD7:   CHIN-7 SCORE 10/1/2019 11/7/2019 11/20/2019 1/6/2020 2/18/2020 2/28/2020 3/17/2020   Total Score - - - - - - -   Total Score 9 10 10 8 7 5 7     CAGE-AID: No flowsheet data found.  PROMIS 10-Global Health (all questions and answers displayed): No flowsheet data found.  [place] Suicide Severity Rating Scale (Lifetime/Recent)  [place] Suicide Severity Rating (Lifetime/Recent) 12/18/2018 1/4/2019 2/28/2020   Wish to be Dead (Lifetime) - - Yes   Comments - - (No Data)   Non-Specific Active Suicidal Thoughts (Lifetime) - - Yes   Non-Specific Active Suicidal Thought Description (Lifetime) - " - (No Data)   Most Severe Ideation Rating (Lifetime) - - 5   Most Severe Ideation Description (Lifetime) - - (No Data)   Frequency (Lifetime) - - 3   Duration (Lifetime) - - 4   Controllability (Lifetime) - - 0   Protective Factors  (Lifetime) - - 5   Reasons for Ideation (Lifetime) - - 1   Q1 Wished to be Dead (Past Month) no no -   Q2 Suicidal Thoughts (Past Month) no no -   Q6 Suicide Behavior (Lifetime) no no -   RETIRED: 1. Wish to be Dead (Recent) - - No   RETIRED: 2. Non-Specific Active Suicidal Thoughts (Recent) - - No   3. Active Suicidal Ideation with any Methods (Not Plan) Without Intent to Act (Lifetime) - - Yes   RETIRE: Active Suicidal Ideation with any Methods (Not Plan) Description (Lifetime) - - (No Data)   Comments - - See above   RETIRED: 3. Active Suicidal Ideation with any Methods (Not Plan) Without Intent to Act (Recent) - - No   RETIRE: 4. Active Suicidal Ideation with Some Intent to Act, Without Specific Plan (Lifetime) - - Yes   4. Active Suicidal Ideation with Some Intent to Act, Without Specific Plan (Recent) - - No   RETIRE: 5. Active Suicidal Ideation with Specific Plan and Intent (Lifetime) - - Yes   Active Suicidal Ideation with Specific Plan and Intent Description (Lifetime) - - (No Data)   Comments - - Cutting   RETIRED: 5. Active Suicidal Ideation with Specific Plan and Intent (Recent) - - No   Most Severe Ideation Rating (Past Month) - - NA   Most Severe Ideation Description (Past Month) - - (No Data)   Comments - - None   Frequency (Past Month) - - NA   Duration (Past Month) - - NA   Controllability (Past Month) - - NA   Protective Factors (Past Month) - - NA   Reasons for Ideation (Past Month) - - NA   Actual Attempt (Lifetime) - - Yes   Actual Attempt Description (Lifetime) - - (No Data)   Comments - - Meds for suicide attempts 17, 14, 8 years ago all by cutting   Total Number of Actual Attempts (Lifetime) - - (No Data)   Comments - - 3   Actual Attempt (Past 3 Months) - - No    Has subject engaged in non-suicidal self-injurious behavior? (Lifetime) - - Yes   Has subject engaged in non-suicidal self-injurious behavior? (Past 3 Months) - - No   Interrupted Attempts (Lifetime) - - Yes   Total Number of Interrupted Attempts (Lifetime) - - (No Data)   Comments - - 1   Interrupted Attempts (Past 3 Months) - - No   Aborted or Self-Interrupted Attempt (Lifetime) - - Yes   Aborted or Self Interrupted Attempt Description (Lifetime) - - (No Data)   Comments - - Decided to stick around for her daughter   Total Number Aborted or Self Interrupted Attempts (Lifetime) - - (No Data)   Comments - - 1   Aborted or Self-Interrupted Attempt (Past 3 Months) - - No   Preparatory Acts or Behavior (Lifetime) - - Yes   Preparatory Acts or Behavior Description (Lifetime) - - (No Data)   Comments - - Finding sharp instruments for cutting.   Preparatory Acts or Behavior (Past 3 Months) - - No   Most Recent Attempt Date - - (No Data)   Comments - - 8 years ago   Most Recent Attempt Actual Lethality Code - - 2   Most Lethal Attempt Actual Lethality Code - - 2   Initial/First Attempt Date - - (No Data)   Comments - - 17 years ago   Initial/First Attempt Actual Lethality Code - - 2         ASSESSMENT: Current Emotional / Mental Status (status of significant symptoms):   Risk status (Self / Other harm or suicidal ideation)   Patient denies current fears or concerns for personal safety.   Patient denies current or recent suicidal ideation or behaviors.   Patient denies current or recent homicidal ideation or behaviors.   Patient denies current or recent self injurious behavior or ideation.   Patient denies other safety concerns.   Patient reports there has been no change in risk factors since their last session.     Patient reports there has been no change in protective factors since their last session.     Recommended that patient call 911 or go to the local ED should there be a change in any of these risk  factors.     Appearance:   Unable to assess    Eye Contact:   Unable to assess    Psychomotor Behavior: Unable to assess    Attitude:   Cooperative  Interested   Orientation:   All   Speech    Rate / Production: Normal/ Responsive Normal     Volume:  Normal    Mood:    Normal frustrated   Affect:    Appropriate    Thought Content:  Clear    Thought Form:  Coherent  Logical    Insight:    Good      Medication Review:   Changes to psychiatric medications, see updated Medication List in EPIC.      Medication Compliance:   Yes     Changes in Health Issues:   Five weeks smoke free     Chemical Use Review:   Substance Use: Chemical use reviewed, no active concerns identified      Tobacco Use: No current tobacco use.      Diagnosis:  1. Moderate mixed bipolar I disorder (H)        Collateral Reports Completed:   Not Applicable    PLAN: (Patient Tasks / Therapist Tasks / Other)  Client to consult with PCP and psychiatry as necessary.        Kd Morris                                                         ______________________________________________________________________    Individual Treatment Plan    Patient's Name: Bia Bill  YOB: 1965    Date of Creation: 10/11/21  Date Treatment Plan Last  Reviewed/Revised: 8/3/22    DSM5 Diagnoses: Diagnoses: 296.40 Bipolar I Disorder Current or Most Recent Episode Manic, Unspecified  Psychosocial & Contextual Factors: financial difficulties, chronic pain    PROMIS (reviewed every 90 days):     Referral / Collaboration:  Referral to another professional/service is not indicated at this time..    Anticipated number of session for this episode of care: 60  Anticipation frequency of session: Monthly  Anticipated Duration of each session: 38-52 minutes  Treatment plan will be reviewed in 90 days or when goals have been changed.       Bipolar Disorder  Treatment plan:  1. Education- the Biopsychosocial model of Bipolar Disorder    a. Client will be able to  describe in general terms what Bipolar Disorder  is and is not  b. Client will be able to describe how Bipolar Disorder  has affected their life in at least two different areas, such as school or work and Home/relationships  c. Clients parents/guardians or significant others will be provided information on what Bipolar Disorder  is and the ways it can affect relationships and be encouraged to be a part of clients treatment team.  2. Medication  a. Client will participate in medication evaluation for Bipolar Disorder  symptoms and follow medication recommendations.   3. Identification and management of triggers  a. Client and therapist will examine patient s history to determine if there are predictable triggers for manic or depressive episodes (e.g. boredom, anger, family stress)  b. Client and therapist will develop means of diffusing these triggers (e.g. relaxation strategies, boundary setting, anger management)  4. Comorbid conditions   a. Client and therapist will asses for comorbid conditions (e.g. anxiety, depression, substance use). and add additional items to treatment plan as needed  5. Self-care  a. Client will identify 3 things they can do just for themselves  b. Client will take time for quiet, reflection, meditation 3 times per week  c. Client will Learn to set boundaries when appropriate  d. Client will Identify 2 individuals they can call on for support, distraction  1. Behavioral Activation  a. Client will Identify two forms of exercise/activity and engage in them 3 times per week  b. Client will Identify 2 things that make them laugh, and engage in these 3 times per week.  c. Client will Identify 2 Creative activities or hobbies  and engage in them 2 times per week  d. Client will identify music, movies, books that make them feel good and use them 3 times per week  2. Assessment of progress  a. Client will engage in assessment of their progress on a regular basis  Patient has reviewed and agreed to  the above plan.      Kd Morris  March 22, 2022

## 2022-10-17 ENCOUNTER — TELEPHONE (OUTPATIENT)
Dept: FAMILY MEDICINE | Facility: CLINIC | Age: 57
End: 2022-10-17

## 2022-10-17 NOTE — TELEPHONE ENCOUNTER
Prior Authorization Retail Medication Request    Medication/Dose: omeprazole (PRILOSEC) 20 MG DR capsule  ICD code (if different than what is on RX):    Previously Tried and Failed:    Rationale:      Insurance Name:  [employer]  Insurance ID:  [id number]      Pharmacy Information (if different than what is on RX)  Name:    Phone:

## 2022-10-18 NOTE — TELEPHONE ENCOUNTER
PA Initiation    Medication: omeprazole (PRILOSEC) 20 MG DR capsule   Insurance Company: [employer] - Phone [phone] Fax [phone]  [facility] the [id number] - [place], [place] - [address] [facility]  Filling Pharmacy Phone: [phone]  Filling Pharmacy Fax: [phone]  Start Date: 10/18/2022

## 2022-10-19 ENCOUNTER — VIRTUAL VISIT (OUTPATIENT)
Dept: PSYCHOLOGY | Facility: CLINIC | Age: 57
End: 2022-10-19
Payer: COMMERCIAL

## 2022-10-19 DIAGNOSIS — F31.62 MODERATE MIXED BIPOLAR I DISORDER (H): Primary | ICD-10-CM

## 2022-10-19 PROCEDURE — 90832 PSYTX W PT 30 MINUTES: CPT | Mod: 93 | Performed by: PSYCHOLOGIST

## 2022-10-19 NOTE — PROGRESS NOTES
"    [facility]                                     Progress Note    Disclaimer: Voice recognition software was used to generate this note. As a result, wrong word or 'sound-a-like' substitutions may have occurred due to the inherent limitations of voice recognition software. There may be errors in the script that have gone undetected. Please consider this when interpreting information found in this chart.     Patient Name: Bia Bill  Date: 10/19/22         Service Type: Individual      Session Start Time: 2:00PM  Session End Time: 2:30PM     Session Length: 30    Session #: 93  There has been demonstrated improvement in functioning while patient has been engaged in psychotherapy/psychological service- if withdrawn the patient would deteriorate and/or relapse.     Attendees: Client attended alone    Service Modality:  Phone Visit:      Provider verified identity through the following two step process.  Patient provided:  Patient is known previously to provider    The patient has been notified of the following:      \"We have found that certain health care needs can be provided without the need for a face to face visit.  This service lets us provide the care you need with a phone conversation.       I will have full access to your [facility] medical record during this entire phone call.   I will be taking notes for your medical record.      Since this is like an office visit, we will bill your insurance company for this service.       There are potential benefits and risks of telephone visits (e.g. limits to patient confidentiality) that differ from in-person visits.?Confidentiality still applies for telephone services, and nobody will record the visit.  It is important to be in a quiet, private space that is free of distractions (including cell phone or other devices) during the visit.??      If during the course of the call I believe a telephone visit is not appropriate, you will not " "be charged for this service\"     Consent has been obtained for this service by care team member: Yes     DATA  Interactive Complexity: No  Crisis: No        Progress Since Last Session (Related to Symptoms / Goals / Homework):   Symptoms: Improving taking care of her diabetes.    Homework: Achieved / completed to satisfaction      Episode of Care Goals: Satisfactory progress - MAINTENANCE (Working to maintain change, with risk of relapse); Intervened by continuing to positively reinforce healthy behavior choice      Current / Ongoing Stressors and Concerns:  Cold weather is bringing her down.    Has been talking a bit to her estranged sister. Also re-connecting with siblings in [place].  Heart scans show improvement after a year without smoking.      Treatment Objective(s) Addressed in This Session:   identify 2 strategies for more effectively managing Bipolar Disorder.     Review and process traumatic memories        Intervention:    CBT: behavioral activation  Support for recent trauma and subsequent need for life changes.       Assessments completed prior to visit:  The following assessments were completed by patient for this visit:  PHQ9:   PHQ-9 SCORE 11/7/2019 11/20/2019 1/6/2020 1/30/2020 2/18/2020 2/28/2020 3/17/2020   PHQ-9 Total Score - - - - - - -   PHQ-9 Total Score 11 13 10 11 9 8 8     GAD7:   CHIN-7 SCORE 10/1/2019 11/7/2019 11/20/2019 1/6/2020 2/18/2020 2/28/2020 3/17/2020   Total Score - - - - - - -   Total Score 9 10 10 8 7 5 7     CAGE-AID: No flowsheet data found.  PROMIS 10-Global Health (all questions and answers displayed): No flowsheet data found.  [place] Suicide Severity Rating Scale (Lifetime/Recent)  [place] Suicide Severity Rating (Lifetime/Recent) 12/18/2018 1/4/2019 2/28/2020   Wish to be Dead (Lifetime) - - Yes   Comments - - (No Data)   Non-Specific Active Suicidal Thoughts (Lifetime) - - Yes   Non-Specific Active Suicidal Thought Description (Lifetime) - - (No Data)   Most Severe " Ideation Rating (Lifetime) - - 5   Most Severe Ideation Description (Lifetime) - - (No Data)   Frequency (Lifetime) - - 3   Duration (Lifetime) - - 4   Controllability (Lifetime) - - 0   Protective Factors  (Lifetime) - - 5   Reasons for Ideation (Lifetime) - - 1   Q1 Wished to be Dead (Past Month) no no -   Q2 Suicidal Thoughts (Past Month) no no -   Q6 Suicide Behavior (Lifetime) no no -   RETIRED: 1. Wish to be Dead (Recent) - - No   RETIRED: 2. Non-Specific Active Suicidal Thoughts (Recent) - - No   3. Active Suicidal Ideation with any Methods (Not Plan) Without Intent to Act (Lifetime) - - Yes   RETIRE: Active Suicidal Ideation with any Methods (Not Plan) Description (Lifetime) - - (No Data)   Comments - - See above   RETIRED: 3. Active Suicidal Ideation with any Methods (Not Plan) Without Intent to Act (Recent) - - No   RETIRE: 4. Active Suicidal Ideation with Some Intent to Act, Without Specific Plan (Lifetime) - - Yes   4. Active Suicidal Ideation with Some Intent to Act, Without Specific Plan (Recent) - - No   RETIRE: 5. Active Suicidal Ideation with Specific Plan and Intent (Lifetime) - - Yes   RETIRE: Active Suicidal Ideation with Specific Plan and Intent Description (Lifetime) - - (No Data)   Comments - - Cutting   RETIRED: 5. Active Suicidal Ideation with Specific Plan and Intent (Recent) - - No   Most Severe Ideation Rating (Past Month) - - NA   Most Severe Ideation Description (Past Month) - - (No Data)   Comments - - None   Frequency (Past Month) - - NA   Duration (Past Month) - - NA   Controllability (Past Month) - - NA   Protective Factors (Past Month) - - NA   Reasons for Ideation (Past Month) - - NA   Actual Attempt (Lifetime) - - Yes   Actual Attempt Description (Lifetime) - - (No Data)   Comments - - Meds for suicide attempts 17, 14, 8 years ago all by cutting   Total Number of Actual Attempts (Lifetime) - - (No Data)   Comments - - 3   Actual Attempt (Past 3 Months) - - No   Has subject  engaged in non-suicidal self-injurious behavior? (Lifetime) - - Yes   Has subject engaged in non-suicidal self-injurious behavior? (Past 3 Months) - - No   Interrupted Attempts (Lifetime) - - Yes   Total Number of Interrupted Attempts (Lifetime) - - (No Data)   Comments - - 1   Interrupted Attempts (Past 3 Months) - - No   Aborted or Self-Interrupted Attempt (Lifetime) - - Yes   Aborted or Self Interrupted Attempt Description (Lifetime) - - (No Data)   Comments - - Decided to stick around for her daughter   Total Number Aborted or Self Interrupted Attempts (Lifetime) - - (No Data)   Comments - - 1   Aborted or Self-Interrupted Attempt (Past 3 Months) - - No   Preparatory Acts or Behavior (Lifetime) - - Yes   Preparatory Acts or Behavior Description (Lifetime) - - (No Data)   Comments - - Finding sharp instruments for cutting.   Preparatory Acts or Behavior (Past 3 Months) - - No   Most Recent Attempt Date - - (No Data)   Comments - - 8 years ago   Most Recent Attempt Actual Lethality Code - - 2   Most Lethal Attempt Actual Lethality Code - - 2   Initial/First Attempt Date - - (No Data)   Comments - - 17 years ago   Initial/First Attempt Actual Lethality Code - - 2         ASSESSMENT: Current Emotional / Mental Status (status of significant symptoms):   Risk status (Self / Other harm or suicidal ideation)   Patient denies current fears or concerns for personal safety.   Patient denies current or recent suicidal ideation or behaviors.   Patient denies current or recent homicidal ideation or behaviors.   Patient denies current or recent self injurious behavior or ideation.   Patient denies other safety concerns.   Patient reports there has been no change in risk factors since their last session.     Patient reports there has been no change in protective factors since their last session.     Recommended that patient call 911 or go to the local ED should there be a change in any of these risk  factors.     Appearance:   Unable to assess    Eye Contact:   Unable to assess    Psychomotor Behavior: Unable to assess    Attitude:   Cooperative  Interested   Orientation:   All   Speech    Rate / Production: Normal/ Responsive Normal     Volume:  Normal    Mood:    Normal frustrated   Affect:    Appropriate    Thought Content:  Clear    Thought Form:  Coherent  Logical    Insight:    Good      Medication Review:   Changes to psychiatric medications, see updated Medication List in EPIC.      Medication Compliance:   Yes     Changes in Health Issues:   Five weeks smoke free     Chemical Use Review:   Substance Use: Chemical use reviewed, no active concerns identified      Tobacco Use: No current tobacco use.      Diagnosis:  1. Moderate mixed bipolar I disorder (H)        Collateral Reports Completed:   Not Applicable    PLAN: (Patient Tasks / Therapist Tasks / Other)  Client to consult with PCP and psychiatry as necessary.        Kd Morris                                                         ______________________________________________________________________    Individual Treatment Plan    Patient's Name: Bia Bill  YOB: 1965    Date of Creation: 10/11/21  Date Treatment Plan Last  Reviewed/Revised: 8/3/22    DSM5 Diagnoses: Diagnoses: 296.40 Bipolar I Disorder Current or Most Recent Episode Manic, Unspecified  Psychosocial & Contextual Factors: financial difficulties, chronic pain    PROMIS (reviewed every 90 days):     Referral / Collaboration:  Referral to another professional/service is not indicated at this time..    Anticipated number of session for this episode of care: 60  Anticipation frequency of session: Monthly  Anticipated Duration of each session: 38-52 minutes  Treatment plan will be reviewed in 90 days or when goals have been changed.       Bipolar Disorder  Treatment plan:  1. Education- the Biopsychosocial model of Bipolar Disorder    a. Client will be able to  describe in general terms what Bipolar Disorder  is and is not  b. Client will be able to describe how Bipolar Disorder  has affected their life in at least two different areas, such as school or work and Home/relationships  c. Clients parents/guardians or significant others will be provided information on what Bipolar Disorder  is and the ways it can affect relationships and be encouraged to be a part of clients treatment team.  2. Medication  a. Client will participate in medication evaluation for Bipolar Disorder  symptoms and follow medication recommendations.   3. Identification and management of triggers  a. Client and therapist will examine patient s history to determine if there are predictable triggers for manic or depressive episodes (e.g. boredom, anger, family stress)  b. Client and therapist will develop means of diffusing these triggers (e.g. relaxation strategies, boundary setting, anger management)  4. Comorbid conditions   a. Client and therapist will asses for comorbid conditions (e.g. anxiety, depression, substance use). and add additional items to treatment plan as needed  5. Self-care  a. Client will identify 3 things they can do just for themselves  b. Client will take time for quiet, reflection, meditation 3 times per week  c. Client will Learn to set boundaries when appropriate  d. Client will Identify 2 individuals they can call on for support, distraction  1. Behavioral Activation  a. Client will Identify two forms of exercise/activity and engage in them 3 times per week  b. Client will Identify 2 things that make them laugh, and engage in these 3 times per week.  c. Client will Identify 2 Creative activities or hobbies  and engage in them 2 times per week  d. Client will identify music, movies, books that make them feel good and use them 3 times per week  2. Assessment of progress  a. Client will engage in assessment of their progress on a regular basis  Patient has reviewed and agreed to  the above plan.      Kd Morris  March 22, 2022

## 2022-10-19 NOTE — TELEPHONE ENCOUNTER
Prior Authorization Approval    Authorization Effective Date: 9/18/2022  Authorization Expiration Date: 10/18/2023  Medication: omeprazole (PRILOSEC) 20 MG DR capsule--APPROVED  Approved Dose/Quantity:   Reference #:     Insurance Company: [employer] - Phone [phone] Fax [phone]  Expected CoPay:       CoPay Card Available:      Foundation Assistance Needed:    Which Pharmacy is filling the prescription (Not needed for infusion/clinic administered): [id number] - [place], [place] - [address]  Pharmacy Notified: Yes  Patient Notified: Yes **Instructed pharmacy to notify patient when script is ready to /ship.**

## 2022-11-09 ENCOUNTER — VIRTUAL VISIT (OUTPATIENT)
Dept: PSYCHOLOGY | Facility: CLINIC | Age: 57
End: 2022-11-09
Payer: COMMERCIAL

## 2022-11-09 DIAGNOSIS — F31.62 MODERATE MIXED BIPOLAR I DISORDER (H): Primary | ICD-10-CM

## 2022-11-09 PROCEDURE — 90832 PSYTX W PT 30 MINUTES: CPT | Mod: 93 | Performed by: PSYCHOLOGIST

## 2022-11-09 NOTE — PROGRESS NOTES
"    [facility]                                     Progress Note    Disclaimer: Voice recognition software was used to generate this note. As a result, wrong word or 'sound-a-like' substitutions may have occurred due to the inherent limitations of voice recognition software. There may be errors in the script that have gone undetected. Please consider this when interpreting information found in this chart.     Patient Name: Bia Bill  Date: 11/9/22         Service Type: Individual      Session Start Time: 3:00PM  Session End Time: 3:30PM     Session Length: 30    Session #: 94  There has been demonstrated improvement in functioning while patient has been engaged in psychotherapy/psychological service- if withdrawn the patient would deteriorate and/or relapse.     Attendees: Client attended alone    Service Modality:  Phone Visit:      Provider verified identity through the following two step process.  Patient provided:  Patient is known previously to provider    The patient has been notified of the following:      \"We have found that certain health care needs can be provided without the need for a face to face visit.  This service lets us provide the care you need with a phone conversation.       I will have full access to your [facility] medical record during this entire phone call.   I will be taking notes for your medical record.      Since this is like an office visit, we will bill your insurance company for this service.       There are potential benefits and risks of telephone visits (e.g. limits to patient confidentiality) that differ from in-person visits.?Confidentiality still applies for telephone services, and nobody will record the visit.  It is important to be in a quiet, private space that is free of distractions (including cell phone or other devices) during the visit.??      If during the course of the call I believe a telephone visit is not appropriate, you will not " "be charged for this service\"     Consent has been obtained for this service by care team member: Yes     DATA  Interactive Complexity: No  Crisis: No        Progress Since Last Session (Related to Symptoms / Goals / Homework):   Symptoms: Improving taking care of her diabetes.    Homework: Achieved / completed to satisfaction      Episode of Care Goals: Satisfactory progress - MAINTENANCE (Working to maintain change, with risk of relapse); Intervened by continuing to positively reinforce healthy behavior choice      Current / Ongoing Stressors and Concerns:  Arguments with daughter over money.  Bought herself lift chair.   Getting ready for holidays.    INR has been a little high. Reduced greens, re-check tomorrow.        Treatment Objective(s) Addressed in This Session:   identify 2 strategies for more effectively managing Bipolar Disorder.     Review and process traumatic memories        Intervention:    CBT: behavioral activation  Support for recent trauma and subsequent need for life changes.       Assessments completed prior to visit:  The following assessments were completed by patient for this visit:  PHQ9:   PHQ-9 SCORE 11/7/2019 11/20/2019 1/6/2020 1/30/2020 2/18/2020 2/28/2020 3/17/2020   PHQ-9 Total Score - - - - - - -   PHQ-9 Total Score 11 13 10 11 9 8 8     GAD7:   CHIN-7 SCORE 10/1/2019 11/7/2019 11/20/2019 1/6/2020 2/18/2020 2/28/2020 3/17/2020   Total Score - - - - - - -   Total Score 9 10 10 8 7 5 7     CAGE-AID: No flowsheet data found.  PROMIS 10-Global Health (all questions and answers displayed): No flowsheet data found.  [place] Suicide Severity Rating Scale (Lifetime/Recent)  [place] Suicide Severity Rating (Lifetime/Recent) 12/18/2018 1/4/2019 2/28/2020   Wish to be Dead (Lifetime) - - Yes   Comments - - (No Data)   Non-Specific Active Suicidal Thoughts (Lifetime) - - Yes   Non-Specific Active Suicidal Thought Description (Lifetime) - - (No Data)   Most Severe Ideation Rating (Lifetime) - - " 5   Most Severe Ideation Description (Lifetime) - - (No Data)   Frequency (Lifetime) - - 3   Duration (Lifetime) - - 4   Controllability (Lifetime) - - 0   Protective Factors  (Lifetime) - - 5   Reasons for Ideation (Lifetime) - - 1   Q1 Wished to be Dead (Past Month) no no -   Q2 Suicidal Thoughts (Past Month) no no -   Q6 Suicide Behavior (Lifetime) no no -   RETIRED: 1. Wish to be Dead (Recent) - - No   RETIRED: 2. Non-Specific Active Suicidal Thoughts (Recent) - - No   3. Active Suicidal Ideation with any Methods (Not Plan) Without Intent to Act (Lifetime) - - Yes   RETIRE: Active Suicidal Ideation with any Methods (Not Plan) Description (Lifetime) - - (No Data)   Comments - - See above   RETIRED: 3. Active Suicidal Ideation with any Methods (Not Plan) Without Intent to Act (Recent) - - No   RETIRE: 4. Active Suicidal Ideation with Some Intent to Act, Without Specific Plan (Lifetime) - - Yes   4. Active Suicidal Ideation with Some Intent to Act, Without Specific Plan (Recent) - - No   RETIRE: 5. Active Suicidal Ideation with Specific Plan and Intent (Lifetime) - - Yes   RETIRE: Active Suicidal Ideation with Specific Plan and Intent Description (Lifetime) - - (No Data)   Comments - - Cutting   RETIRED: 5. Active Suicidal Ideation with Specific Plan and Intent (Recent) - - No   Most Severe Ideation Rating (Past Month) - - NA   Most Severe Ideation Description (Past Month) - - (No Data)   Comments - - None   Frequency (Past Month) - - NA   Duration (Past Month) - - NA   Controllability (Past Month) - - NA   Protective Factors (Past Month) - - NA   Reasons for Ideation (Past Month) - - NA   Actual Attempt (Lifetime) - - Yes   Actual Attempt Description (Lifetime) - - (No Data)   Comments - - Meds for suicide attempts 17, 14, 8 years ago all by cutting   Total Number of Actual Attempts (Lifetime) - - (No Data)   Comments - - 3   Actual Attempt (Past 3 Months) - - No   Has subject engaged in non-suicidal  self-injurious behavior? (Lifetime) - - Yes   Has subject engaged in non-suicidal self-injurious behavior? (Past 3 Months) - - No   Interrupted Attempts (Lifetime) - - Yes   Total Number of Interrupted Attempts (Lifetime) - - (No Data)   Comments - - 1   Interrupted Attempts (Past 3 Months) - - No   Aborted or Self-Interrupted Attempt (Lifetime) - - Yes   Aborted or Self Interrupted Attempt Description (Lifetime) - - (No Data)   Comments - - Decided to stick around for her daughter   Total Number Aborted or Self Interrupted Attempts (Lifetime) - - (No Data)   Comments - - 1   Aborted or Self-Interrupted Attempt (Past 3 Months) - - No   Preparatory Acts or Behavior (Lifetime) - - Yes   Preparatory Acts or Behavior Description (Lifetime) - - (No Data)   Comments - - Finding sharp instruments for cutting.   Preparatory Acts or Behavior (Past 3 Months) - - No   Most Recent Attempt Date - - (No Data)   Comments - - 8 years ago   Most Recent Attempt Actual Lethality Code - - 2   Most Lethal Attempt Actual Lethality Code - - 2   Initial/First Attempt Date - - (No Data)   Comments - - 17 years ago   Initial/First Attempt Actual Lethality Code - - 2         ASSESSMENT: Current Emotional / Mental Status (status of significant symptoms):   Risk status (Self / Other harm or suicidal ideation)   Patient denies current fears or concerns for personal safety.   Patient denies current or recent suicidal ideation or behaviors.   Patient denies current or recent homicidal ideation or behaviors.   Patient denies current or recent self injurious behavior or ideation.   Patient denies other safety concerns.   Patient reports there has been no change in risk factors since their last session.     Patient reports there has been no change in protective factors since their last session.     Recommended that patient call 911 or go to the local ED should there be a change in any of these risk factors.     Appearance:   Unable to assess    Eye  Contact:   Unable to assess    Psychomotor Behavior: Unable to assess    Attitude:   Cooperative  Interested   Orientation:   All   Speech    Rate / Production: Normal/ Responsive Normal     Volume:  Normal    Mood:    Normal frustrated   Affect:    Appropriate    Thought Content:  Clear    Thought Form:  Coherent  Logical    Insight:    Good      Medication Review:   Changes to psychiatric medications, see updated Medication List in EPIC.      Medication Compliance:   Yes     Changes in Health Issues:   Five weeks smoke free     Chemical Use Review:   Substance Use: Chemical use reviewed, no active concerns identified      Tobacco Use: No current tobacco use.      Diagnosis:  1. Moderate mixed bipolar I disorder (H)        Collateral Reports Completed:   Not Applicable    PLAN: (Patient Tasks / Therapist Tasks / Other)  Client to consult with PCP and psychiatry as necessary.        Kd Morris                                                         ______________________________________________________________________    Individual Treatment Plan    Patient's Name: Bia Bill  YOB: 1965    Date of Creation: 10/11/21  Date Treatment Plan Last  Reviewed/Revised: 11/9/22    DSM5 Diagnoses: Diagnoses: 296.40 Bipolar I Disorder Current or Most Recent Episode Manic, Unspecified  Psychosocial & Contextual Factors: financial difficulties, chronic pain    PROMIS (reviewed every 90 days):     Referral / Collaboration:  Referral to another professional/service is not indicated at this time..    Anticipated number of session for this episode of care: 60  Anticipation frequency of session: Monthly  Anticipated Duration of each session: 38-52 minutes  Treatment plan will be reviewed in 90 days or when goals have been changed.       Bipolar Disorder  Treatment plan:  1. Education- the Biopsychosocial model of Bipolar Disorder    a. Client will be able to describe in general terms what Bipolar Disorder   is and is not  b. Client will be able to describe how Bipolar Disorder  has affected their life in at least two different areas, such as school or work and Home/relationships  c. Clients parents/guardians or significant others will be provided information on what Bipolar Disorder  is and the ways it can affect relationships and be encouraged to be a part of clients treatment team.  2. Medication  a. Client will participate in medication evaluation for Bipolar Disorder  symptoms and follow medication recommendations.   3. Identification and management of triggers  a. Client and therapist will examine patient s history to determine if there are predictable triggers for manic or depressive episodes (e.g. boredom, anger, family stress)  b. Client and therapist will develop means of diffusing these triggers (e.g. relaxation strategies, boundary setting, anger management)  4. Comorbid conditions   a. Client and therapist will asses for comorbid conditions (e.g. anxiety, depression, substance use). and add additional items to treatment plan as needed  5. Self-care  a. Client will identify 3 things they can do just for themselves  b. Client will take time for quiet, reflection, meditation 3 times per week  c. Client will Learn to set boundaries when appropriate  d. Client will Identify 2 individuals they can call on for support, distraction  1. Behavioral Activation  a. Client will Identify two forms of exercise/activity and engage in them 3 times per week  b. Client will Identify 2 things that make them laugh, and engage in these 3 times per week.  c. Client will Identify 2 Creative activities or hobbies  and engage in them 2 times per week  d. Client will identify music, movies, books that make them feel good and use them 3 times per week  2. Assessment of progress  a. Client will engage in assessment of their progress on a regular basis  Patient has reviewed and agreed to the above plan.      Kd Morris  March 22,  2022

## 2022-11-16 DIAGNOSIS — M10.9 ACUTE GOUTY ARTHRITIS: ICD-10-CM

## 2022-11-16 DIAGNOSIS — I89.0 LYMPHEDEMA OF BOTH LOWER EXTREMITIES: ICD-10-CM

## 2022-11-16 DIAGNOSIS — E11.9 TYPE 2 DIABETES MELLITUS WITHOUT COMPLICATION, WITHOUT LONG-TERM CURRENT USE OF INSULIN (H): ICD-10-CM

## 2022-11-17 NOTE — TELEPHONE ENCOUNTER
"More than one year since last visit    Last Written Prescription Date: 6/6/22  Last Fill Quantity: 90, # refills: 1  Last office visit provider: 11/23/2020        Requested Prescriptions   Pending Prescriptions Disp Refills     metFORMIN (GLUCOPHAGE XR) 500 MG 24 hr tablet [Pharmacy Med Name: METFORMIN 500MG ER TABLET] 180 tablet 1     Sig: TAKE 1 TABLET BY MOUTH TWICE A DAY WITH MEALS       Biguanide Agents Failed - 11/16/2022  8:34 AM        Failed - Patient has documented A1c within the specified period of time.     If HgbA1C is 8 or greater, it needs to be on file within the past 3 months.  If less than 8, must be on file within the past 6 months.     Recent Labs   Lab Test 07/22/21  1135 02/02/21  0915   A1C  --  6.5*   80096 5.7*  --              Failed - Patient's CR is NOT>1.4 OR Patient's EGFR is NOT<45 within past 12 mos.     Recent Labs   Lab Test 08/11/21  1514 02/16/21  1123   GFRESTIMATED  --  >90   GFRESTBLACK  --  >90   26113 54*  --        Recent Labs   Lab Test 02/16/21  1123   CR 0.56             Failed - Recent (6 mo) or future (30 days) visit within the authorizing provider's specialty     Patient had office visit in the last 6 months or has a visit in the next 30 days with authorizing provider or within the authorizing provider's specialty.  See \"Patient Info\" tab in inbasket, or \"Choose Columns\" in Meds & Orders section of the refill encounter.            Passed - Patient is age 10 or older        Passed - Patient does NOT have a diagnosis of CHF.        Passed - Medication is active on med list        Passed - Patient is not pregnant        Passed - Patient has not had a positive pregnancy test within the past 12 mos.            allopurinol (ZYLOPRIM) 300 MG tablet [Pharmacy Med Name: ALLOPURINOL 300MG TABLET] 90 tablet 1     Sig: TAKE 1 TABLET BY MOUTH ONCE DAILY       Gout Agents Protocol Failed - 11/16/2022  8:34 AM        Failed - CBC on file in past 12 months     Recent Labs   Lab Test " "02/25/21  1109 02/16/21  1123   WBC  --  5.0   RBC  --  5.20   HGB  --  12.2   HCT 44.8 43.0   PLT  --  127*                 Failed - ALT on file in past 12 months     Recent Labs   Lab Test 11/17/20  0948   ALT 26             Failed - Has Uric Acid on file in past 12 months and value is less than 6     Recent Labs   Lab Test 12/13/18  1137   URIC 4.5     If level is 6mg/dL or greater, ok to refill one time and refer to provider.           Failed - Recent (12 mo) or future (30 days) visit within the authorizing provider's specialty     Patient has had an office visit with the authorizing provider or a provider within the authorizing providers department within the previous 12 mos or has a future within next 30 days. See \"Patient Info\" tab in inbasket, or \"Choose Columns\" in Meds & Orders section of the refill encounter.              Failed - Normal serum creatinine on file in the past 12 months     Recent Labs   Lab Test 02/16/21  1123   CR 0.56       Ok to refill medication if creatinine is low          Passed - Medication is active on med list        Passed - Patient is age 18 or older        Passed - No active pregnancy on record        Passed - No positive pregnancy test in past year           VITAMIN D3 25 MCG (1000 UT) tablet [Pharmacy Med Name: VITAMIN D3 25MCG (1000IU) TAB] 90 tablet 0     Sig: TAKE 1 TABLET BY MOUTH ONCE DAILY       Vitamin Supplements (Adult) Protocol Failed - 11/16/2022  8:34 AM        Failed - Recent (12 mo) or future (30 days) visit within the authorizing provider's specialty     Patient has had an office visit with the authorizing provider or a provider within the authorizing providers department within the previous 12 mos or has a future within next 30 days. See \"Patient Info\" tab in inbasket, or \"Choose Columns\" in Meds & Orders section of the refill encounter.              Passed - High dose Vitamin D not ordered        Passed - Medication is active on med list       "           Bia Slater RN 11/17/22 3:30 PM

## 2022-11-18 RX ORDER — ALLOPURINOL 300 MG/1
TABLET ORAL
Qty: 90 TABLET | Refills: 1 | Status: SHIPPED | OUTPATIENT
Start: 2022-11-18 | End: 2023-05-24

## 2022-11-18 RX ORDER — METFORMIN HCL 500 MG
TABLET, EXTENDED RELEASE 24 HR ORAL
Qty: 180 TABLET | Refills: 1 | Status: SHIPPED | OUTPATIENT
Start: 2022-11-18

## 2022-11-18 RX ORDER — CHOLECALCIFEROL (VITAMIN D3) 25 MCG
TABLET ORAL
Qty: 90 TABLET | Refills: 0 | Status: SHIPPED | OUTPATIENT
Start: 2022-11-18 | End: 2023-02-22

## 2022-12-22 ENCOUNTER — VIRTUAL VISIT (OUTPATIENT)
Dept: PSYCHOLOGY | Facility: CLINIC | Age: 57
End: 2022-12-22
Payer: COMMERCIAL

## 2022-12-22 DIAGNOSIS — F31.62 MODERATE MIXED BIPOLAR I DISORDER (H): Primary | ICD-10-CM

## 2022-12-22 PROCEDURE — 90834 PSYTX W PT 45 MINUTES: CPT | Mod: 93 | Performed by: PSYCHOLOGIST

## 2022-12-22 NOTE — PROGRESS NOTES
"    [facility]                                     Progress Note    Disclaimer: Voice recognition software was used to generate this note. As a result, wrong word or 'sound-a-like' substitutions may have occurred due to the inherent limitations of voice recognition software. There may be errors in the script that have gone undetected. Please consider this when interpreting information found in this chart.     Patient Name: Bia Bill  Date: 12/22/22         Service Type: Individual      Session Start Time: 4:00PM  Session End Time: 4:45PM     Session Length: 45    Session #: 94  There has been demonstrated improvement in functioning while patient has been engaged in psychotherapy/psychological service- if withdrawn the patient would deteriorate and/or relapse.     Attendees: Client attended alone    Service Modality:  Phone Visit:      Provider verified identity through the following two step process.  Patient provided:  Patient is known previously to provider    The patient has been notified of the following:      \"We have found that certain health care needs can be provided without the need for a face to face visit.  This service lets us provide the care you need with a phone conversation.       I will have full access to your [facility] medical record during this entire phone call.   I will be taking notes for your medical record.      Since this is like an office visit, we will bill your insurance company for this service.       There are potential benefits and risks of telephone visits (e.g. limits to patient confidentiality) that differ from in-person visits.?Confidentiality still applies for telephone services, and nobody will record the visit.  It is important to be in a quiet, private space that is free of distractions (including cell phone or other devices) during the visit.??      If during the course of the call I believe a telephone visit is not appropriate, you will not " "be charged for this service\"     Consent has been obtained for this service by care team member: Yes     DATA  Interactive Complexity: No  Crisis: No        Progress Since Last Session (Related to Symptoms / Goals / Homework):   Symptoms: Improving taking care of her diabetes.    Homework: Achieved / completed to satisfaction      Episode of Care Goals: Satisfactory progress - MAINTENANCE (Working to maintain change, with risk of relapse); Intervened by continuing to positively reinforce healthy behavior choice      Current / Ongoing Stressors and Concerns:  Arguments with daughter over money.  Bought herself lift chair.   Getting ready for holidays.    INR has been a little high. Reduced greens, re-check tomorrow.     Scheduled for angiogram tomorrow. Some flutter and chest pain. Has quit smoking and lost weight.   Processed concerns about upcoming procedure.   Has mended relationship with other daughter. Sister in [place] is worried about her.        Treatment Objective(s) Addressed in This Session:   identify 2 strategies for more effectively managing Bipolar Disorder.     Review and process traumatic memories        Intervention:    CBT: behavioral activation  Support for recent trauma and subsequent need for life changes.       Assessments completed prior to visit:  The following assessments were completed by patient for this visit:  PHQ9:   PHQ-9 SCORE 11/7/2019 11/20/2019 1/6/2020 1/30/2020 2/18/2020 2/28/2020 3/17/2020   PHQ-9 Total Score - - - - - - -   PHQ-9 Total Score 11 13 10 11 9 8 8     GAD7:   CHIN-7 SCORE 10/1/2019 11/7/2019 11/20/2019 1/6/2020 2/18/2020 2/28/2020 3/17/2020   Total Score - - - - - - -   Total Score 9 10 10 8 7 5 7     CAGE-AID: No flowsheet data found.  PROMIS 10-Global Health (all questions and answers displayed): No flowsheet data found.  [place] Suicide Severity Rating Scale (Lifetime/Recent)  [place] Suicide Severity Rating (Lifetime/Recent) 12/18/2018 1/4/2019 2/28/2020   Wish to " be Dead (Lifetime) - - Yes   Comments - - (No Data)   Non-Specific Active Suicidal Thoughts (Lifetime) - - Yes   Non-Specific Active Suicidal Thought Description (Lifetime) - - (No Data)   Most Severe Ideation Rating (Lifetime) - - 5   Most Severe Ideation Description (Lifetime) - - (No Data)   Frequency (Lifetime) - - 3   Duration (Lifetime) - - 4   Controllability (Lifetime) - - 0   Protective Factors  (Lifetime) - - 5   Reasons for Ideation (Lifetime) - - 1   Q1 Wished to be Dead (Past Month) no no -   Q2 Suicidal Thoughts (Past Month) no no -   Q6 Suicide Behavior (Lifetime) no no -   RETIRED: 1. Wish to be Dead (Recent) - - No   RETIRED: 2. Non-Specific Active Suicidal Thoughts (Recent) - - No   3. Active Suicidal Ideation with any Methods (Not Plan) Without Intent to Act (Lifetime) - - Yes   RETIRE: Active Suicidal Ideation with any Methods (Not Plan) Description (Lifetime) - - (No Data)   Comments - - See above   RETIRED: 3. Active Suicidal Ideation with any Methods (Not Plan) Without Intent to Act (Recent) - - No   RETIRE: 4. Active Suicidal Ideation with Some Intent to Act, Without Specific Plan (Lifetime) - - Yes   4. Active Suicidal Ideation with Some Intent to Act, Without Specific Plan (Recent) - - No   RETIRE: 5. Active Suicidal Ideation with Specific Plan and Intent (Lifetime) - - Yes   RETIRE: Active Suicidal Ideation with Specific Plan and Intent Description (Lifetime) - - (No Data)   Comments - - Cutting   RETIRED: 5. Active Suicidal Ideation with Specific Plan and Intent (Recent) - - No   Most Severe Ideation Rating (Past Month) - - NA   Most Severe Ideation Description (Past Month) - - (No Data)   Comments - - None   Frequency (Past Month) - - NA   Duration (Past Month) - - NA   Controllability (Past Month) - - NA   Protective Factors (Past Month) - - NA   Reasons for Ideation (Past Month) - - NA   Actual Attempt (Lifetime) - - Yes   Actual Attempt Description (Lifetime) - - (No Data)   Comments  - - Meds for suicide attempts 17, 14, 8 years ago all by cutting   Total Number of Actual Attempts (Lifetime) - - (No Data)   Comments - - 3   Actual Attempt (Past 3 Months) - - No   Has subject engaged in non-suicidal self-injurious behavior? (Lifetime) - - Yes   Has subject engaged in non-suicidal self-injurious behavior? (Past 3 Months) - - No   Interrupted Attempts (Lifetime) - - Yes   Total Number of Interrupted Attempts (Lifetime) - - (No Data)   Comments - - 1   Interrupted Attempts (Past 3 Months) - - No   Aborted or Self-Interrupted Attempt (Lifetime) - - Yes   Aborted or Self Interrupted Attempt Description (Lifetime) - - (No Data)   Comments - - Decided to stick around for her daughter   Total Number Aborted or Self Interrupted Attempts (Lifetime) - - (No Data)   Comments - - 1   Aborted or Self-Interrupted Attempt (Past 3 Months) - - No   Preparatory Acts or Behavior (Lifetime) - - Yes   Preparatory Acts or Behavior Description (Lifetime) - - (No Data)   Comments - - Finding sharp instruments for cutting.   Preparatory Acts or Behavior (Past 3 Months) - - No   Most Recent Attempt Date - - (No Data)   Comments - - 8 years ago   Most Recent Attempt Actual Lethality Code - - 2   Most Lethal Attempt Actual Lethality Code - - 2   Initial/First Attempt Date - - (No Data)   Comments - - 17 years ago   Initial/First Attempt Actual Lethality Code - - 2         ASSESSMENT: Current Emotional / Mental Status (status of significant symptoms):   Risk status (Self / Other harm or suicidal ideation)   Patient denies current fears or concerns for personal safety.   Patient denies current or recent suicidal ideation or behaviors.   Patient denies current or recent homicidal ideation or behaviors.   Patient denies current or recent self injurious behavior or ideation.   Patient denies other safety concerns.   Patient reports there has been no change in risk factors since their last session.     Patient reports there has  been no change in protective factors since their last session.     Recommended that patient call 911 or go to the local ED should there be a change in any of these risk factors.     Appearance:   Unable to assess    Eye Contact:   Unable to assess    Psychomotor Behavior: Unable to assess    Attitude:   Cooperative  Interested   Orientation:   All   Speech    Rate / Production: Normal/ Responsive Normal     Volume:  Normal    Mood:    Normal frustrated   Affect:    Appropriate    Thought Content:  Clear    Thought Form:  Coherent  Logical    Insight:    Good      Medication Review:   Changes to psychiatric medications, see updated Medication List in EPIC.      Medication Compliance:   Yes     Changes in Health Issues:   Five weeks smoke free     Chemical Use Review:   Substance Use: Chemical use reviewed, no active concerns identified      Tobacco Use: No current tobacco use.      Diagnosis:  1. Moderate mixed bipolar I disorder (H)        Collateral Reports Completed:   Not Applicable    PLAN: (Patient Tasks / Therapist Tasks / Other)  Client to consult with PCP and psychiatry as necessary.        Kd Morris, PhD                                                         ______________________________________________________________________    Individual Treatment Plan    Patient's Name: Bia Bill  YOB: 1965    Date of Creation: 10/11/21  Date Treatment Plan Last  Reviewed/Revised: 11/9/22    DSM5 Diagnoses: Diagnoses: 296.40 Bipolar I Disorder Current or Most Recent Episode Manic, Unspecified  Psychosocial & Contextual Factors: financial difficulties, chronic pain    PROMIS (reviewed every 90 days):     Referral / Collaboration:  Referral to another professional/service is not indicated at this time..    Anticipated number of session for this episode of care: 60  Anticipation frequency of session: Monthly  Anticipated Duration of each session: 38-52 minutes  Treatment plan will be reviewed  in 90 days or when goals have been changed.       Bipolar Disorder  Treatment plan:  1. Education- the Biopsychosocial model of Bipolar Disorder    a. Client will be able to describe in general terms what Bipolar Disorder  is and is not  b. Client will be able to describe how Bipolar Disorder  has affected their life in at least two different areas, such as school or work and Home/relationships  c. Clients parents/guardians or significant others will be provided information on what Bipolar Disorder  is and the ways it can affect relationships and be encouraged to be a part of clients treatment team.  2. Medication  a. Client will participate in medication evaluation for Bipolar Disorder  symptoms and follow medication recommendations.   3. Identification and management of triggers  a. Client and therapist will examine patient s history to determine if there are predictable triggers for manic or depressive episodes (e.g. boredom, anger, family stress)  b. Client and therapist will develop means of diffusing these triggers (e.g. relaxation strategies, boundary setting, anger management)  4. Comorbid conditions   a. Client and therapist will asses for comorbid conditions (e.g. anxiety, depression, substance use). and add additional items to treatment plan as needed  5. Self-care  a. Client will identify 3 things they can do just for themselves  b. Client will take time for quiet, reflection, meditation 3 times per week  c. Client will Learn to set boundaries when appropriate  d. Client will Identify 2 individuals they can call on for support, distraction  1. Behavioral Activation  a. Client will Identify two forms of exercise/activity and engage in them 3 times per week  b. Client will Identify 2 things that make them laugh, and engage in these 3 times per week.  c. Client will Identify 2 Creative activities or hobbies  and engage in them 2 times per week  d. Client will identify music, movies, books that make them  feel good and use them 3 times per week  2. Assessment of progress  a. Client will engage in assessment of their progress on a regular basis  Patient has reviewed and agreed to the above plan.      Kd Morris, PhD  March 22, 2022Has appointment for cardiac stress test  And

## 2023-01-31 NOTE — DISCHARGE SUMMARY
[facility] Discharge Summary    Bia Bill MRN# [id number]   Age: 53 year old YOB: 1965     Date of Admission:  6/6/2019  Date of Discharge::  6/8/2019  Admitting Physician:  Dre Engle MD  Discharge Physician:  Dre Engle MD, MD             Admission Diagnoses:   Cellulitis of right lower extremity [L03.115]          Principle Discharge Diagnosis:       Cellulitis of the Right Lower extremity     See hospital course for further active diagnoses addressed during this admission.            Procedures:   No procedures performed during this admission          Medications Prior to Admission:     Facility-Administered Medications Prior to Admission   Medication Dose Route Frequency Provider Last Rate Last Dose     [DISCONTINUED] hylan (SYNVISC ONE) injection 48 mg  48 mg   Roger Medel DO   48 mg at 07/10/18 1440     Medications Prior to Admission   Medication Sig Dispense Refill Last Dose     acetaminophen (TYLENOL) 500 MG tablet Take 500-1,000 mg by mouth every 6 hours as needed for mild pain   Past Week at Unknown time     albuterol (PROAIR HFA/PROVENTIL HFA/VENTOLIN HFA) 108 (90 BASE) MCG/ACT Inhaler Inhale 2 puffs into the lungs every 6 hours as needed for shortness of breath / dyspnea or wheezing 3 Inhaler 1 6/6/2019 at Unknown time     allopurinol (ZYLOPRIM) 300 MG tablet Take 1 tablet (300 mg) by mouth daily 90 tablet 3 6/6/2019 at AM     ARIPiprazole (ABILIFY) 10 MG tablet TAKE ONE TABLET BY MOUTH EVERY DAY 90 tablet 1 6/5/2019 at PM     buPROPion (WELLBUTRIN XL) 150 MG 24 hr tablet TAKE ONE TABLET BY MOUTH EVERY DAY IN THE MORNING. (Patient taking differently: TAKE ONE TABLET BY MOUTH EVERY DAY IN THE EVENING) 30 tablet 5 6/6/2019 at [place]     furosemide (LASIX) 20 MG tablet TAKE TWO TABLETS BY MOUTH TWICE DAILY 360 tablet 3 6/6/2019 at AM     gabapentin (NEURONTIN) 300 MG capsule TAKE ONE CAPSULE BY MOUTH ONCE DAILY AT BEDTIME WITH A 600 MG TABLET FOR A TOTAL  DOSE  MG 90 capsule 0 6/5/2019 at hs     gabapentin (NEURONTIN) 600 MG tablet TAKE ONE TABLET BY MOUTH THREE TIMES A DAY 90 tablet 0 6/6/2019 at AM     ipratropium - albuterol 0.5 mg/2.5 mg/3 mL (DUONEB) 0.5-2.5 (3) MG/3ML neb solution Take 1 vial (3 mLs) by nebulization every 6 hours as needed for shortness of breath / dyspnea or wheezing 30 vial 1 Past Month at Unknown time     lamoTRIgine (LAMICTAL) 100 MG tablet Take 1 tablet (100 mg) by mouth daily 30 tablet 0 6/6/2019 at AM     levothyroxine (SYNTHROID/LEVOTHROID) 150 MCG tablet Take 1 tablet (150 mcg) by mouth daily 90 tablet 2 6/6/2019 at AM     mometasone-formoterol (DULERA) 200-5 MCG/ACT oral inhaler Inhale 2 puffs into the lungs 2 times daily 13 g 1 6/4/2019 at AM     omeprazole (PRILOSEC) 20 MG DR capsule TAKE ONE CAPSULE BY MOUTH TWICE A  capsule 1 6/5/2019 at pm     order for DME Equipment being ordered: CPAP  AIRSENSE 10  5-18 CM H20  SN# [id number]   DN# [id number]   6/5/2019 at hs     potassium chloride SA (K-DUR/KLOR-CON M) 10 MEQ CR tablet TAKE ONE TABLET BY MOUTH ONE TIME DAILY 90 tablet 3 6/6/2019 at AM     pramipexole (MIRAPEX) 0.125 MG tablet TAKE 1-2 TABLETS BY MOUTH AT BEDTIME 60 tablet 10 6/5/2019 at hs     simvastatin (ZOCOR) 20 MG tablet TAKE ONE TABLET BY MOUTH AT BEDTIME 90 tablet 1 6/5/2019 at HS     traZODone (DESYREL) 150 MG tablet Take 1 tablet (150 mg) by mouth At Bedtime 90 tablet 1 6/5/2019 at HS     warfarin (COUMADIN) 5 MG tablet Take 7.5mg every Mon, wed, Fri; 10 mg all other days or As directed by Anticoagulation clinic 200 tablet 0 6/5/2019 at pm     albuterol (2.5 MG/3ML) 0.083% neb solution Take 1 vial (2.5 mg) by nebulization every 6 hours as needed for shortness of breath / dyspnea or wheezing 1 Box 0 none at home     EPINEPHrine (EPIPEN/ADRENACLICK/OR ANY BX GENERIC EQUIV) 0.3 MG/0.3ML injection 2-pack Inject 0.3 mLs (0.3 mg) into the muscle once as needed for anaphylaxis (Patient not taking: Reported on  "3/29/2019) 0.6 mL 0 needs new script     order for DME Equipment being ordered:x2 [employer] 30/40mmHg compression wraps with x2 extra prs of compression liners 1 each 0 Taking     order for DME Equipment being ordered: Nebulizer 1 Device 0 Taking     order for DME Equipment being ordered: [id number]\" x2pair     \"20-30mmHg [place] Hybrid Liners\" x 2 pair 2 Units 11 Taking     order for DME Equipment being ordered: DEPENDS SIZE LARGE 60 Month 5 Taking     order for DME Equipment being ordered: INCONTINENCE PADS   QID 1 Month 11 Taking     VITAMIN D3 1000 units tablet TAKE ONE TABLET BY MOUTH ONCE DAILY 100 tablet 0 6/6/2019 at AM             Discharge Medications:     Current Discharge Medication List      START taking these medications    Details   cephALEXin (KEFLEX) 500 MG capsule Take 1 capsule (500 mg) by mouth 3 times daily for 7 days  Qty: 21 capsule, Refills: 0    Associated Diagnoses: Cellulitis of right lower extremity      HYDROmorphone (DILAUDID) 2 MG tablet Take 0.5-1 tablets (1-2 mg) by mouth every 3 hours as needed for moderate to severe pain  Qty: 10 tablet, Refills: 0    Associated Diagnoses: Cellulitis of right lower extremity         CONTINUE these medications which have NOT CHANGED    Details   acetaminophen (TYLENOL) 500 MG tablet Take 500-1,000 mg by mouth every 6 hours as needed for mild pain      albuterol (PROAIR HFA/PROVENTIL HFA/VENTOLIN HFA) 108 (90 BASE) MCG/ACT Inhaler Inhale 2 puffs into the lungs every 6 hours as needed for shortness of breath / dyspnea or wheezing  Qty: 3 Inhaler, Refills: 1    Associated Diagnoses: Mild persistent asthma without complication      allopurinol (ZYLOPRIM) 300 MG tablet Take 1 tablet (300 mg) by mouth daily  Qty: 90 tablet, Refills: 3    Associated Diagnoses: Acute gouty arthritis      ARIPiprazole (ABILIFY) 10 MG tablet TAKE ONE TABLET BY MOUTH EVERY DAY  Qty: 90 tablet, Refills: 1    Associated Diagnoses: Major depressive disorder, recurrent episode, " moderate (H)      buPROPion (WELLBUTRIN XL) 150 MG 24 hr tablet TAKE ONE TABLET BY MOUTH EVERY DAY IN THE MORNING.  Qty: 30 tablet, Refills: 5    Associated Diagnoses: Moderate mixed bipolar I disorder (H)      furosemide (LASIX) 20 MG tablet TAKE TWO TABLETS BY MOUTH TWICE DAILY  Qty: 360 tablet, Refills: 3    Associated Diagnoses: Lymphedema of both lower extremities      gabapentin (NEURONTIN) 300 MG capsule TAKE ONE CAPSULE BY MOUTH ONCE DAILY AT BEDTIME WITH A 600 MG TABLET FOR A TOTAL DOSE  MG  Qty: 90 capsule, Refills: 0    Associated Diagnoses: Polyneuropathy in other diseases classified elsewhere (H)      gabapentin (NEURONTIN) 600 MG tablet TAKE ONE TABLET BY MOUTH THREE TIMES A DAY  Qty: 90 tablet, Refills: 0    Associated Diagnoses: Polyneuropathy in other diseases classified elsewhere (H)      ipratropium - albuterol 0.5 mg/2.5 mg/3 mL (DUONEB) 0.5-2.5 (3) MG/3ML neb solution Take 1 vial (3 mLs) by nebulization every 6 hours as needed for shortness of breath / dyspnea or wheezing  Qty: 30 vial, Refills: 1    Associated Diagnoses: Chronic obstructive pulmonary disease, unspecified COPD type (H)      lamoTRIgine (LAMICTAL) 100 MG tablet Take 1 tablet (100 mg) by mouth daily  Qty: 30 tablet, Refills: 0    Associated Diagnoses: Major depressive disorder, recurrent episode, moderate (H)      levothyroxine (SYNTHROID/LEVOTHROID) 150 MCG tablet Take 1 tablet (150 mcg) by mouth daily  Qty: 90 tablet, Refills: 2    Associated Diagnoses: Hypothyroidism, unspecified type      mometasone-formoterol (DULERA) 200-5 MCG/ACT oral inhaler Inhale 2 puffs into the lungs 2 times daily  Qty: 13 g, Refills: 1    Associated Diagnoses: COPD exacerbation (H); Chronic obstructive pulmonary disease, unspecified COPD type (H)      omeprazole (PRILOSEC) 20 MG DR capsule TAKE ONE CAPSULE BY MOUTH TWICE A DAY  Qty: 180 capsule, Refills: 1    Associated Diagnoses: Gastroesophageal reflux disease without esophagitis      !! order  for DME Equipment being ordered: CPAP  AIRSENSE 10  5-18 CM H20  SN# [id number]   DN# [id number]      potassium chloride SA (K-DUR/KLOR-CON M) 10 MEQ CR tablet TAKE ONE TABLET BY MOUTH ONE TIME DAILY  Qty: 90 tablet, Refills: 3    Associated Diagnoses: Lymphedema      pramipexole (MIRAPEX) 0.125 MG tablet TAKE 1-2 TABLETS BY MOUTH AT BEDTIME  Qty: 60 tablet, Refills: 10    Associated Diagnoses: Restless leg syndrome      simvastatin (ZOCOR) 20 MG tablet TAKE ONE TABLET BY MOUTH AT BEDTIME  Qty: 90 tablet, Refills: 1    Associated Diagnoses: Hypercholesteremia      traZODone (DESYREL) 150 MG tablet Take 1 tablet (150 mg) by mouth At Bedtime  Qty: 90 tablet, Refills: 1    Associated Diagnoses: Major depressive disorder, recurrent episode, moderate (H)      warfarin (COUMADIN) 5 MG tablet Take 7.5mg every Mon, wed, Fri; 10 mg all other days or As directed by Anticoagulation clinic  Qty: 200 tablet, Refills: 0    Associated Diagnoses: DVT (deep venous thrombosis) (H)      albuterol (2.5 MG/3ML) 0.083% neb solution Take 1 vial (2.5 mg) by nebulization every 6 hours as needed for shortness of breath / dyspnea or wheezing  Qty: 1 Box, Refills: 0    Associated Diagnoses: Mild persistent asthma without complication      EPINEPHrine (EPIPEN/ADRENACLICK/OR ANY BX GENERIC EQUIV) 0.3 MG/0.3ML injection 2-pack Inject 0.3 mLs (0.3 mg) into the muscle once as needed for anaphylaxis  Qty: 0.6 mL, Refills: 0    Associated Diagnoses: Anaphylactic reaction to bee sting, undetermined intent, subsequent encounter      !! order for DME Equipment being ordered:x2 [employer] 30/40mmHg compression wraps with x2 extra prs of compression liners  Qty: 1 each, Refills: 0    Associated Diagnoses: Secondary lymphedema      !! order for DME Equipment being ordered: Nebulizer  Qty: 1 Device, Refills: 0    Associated Diagnoses: COPD exacerbation (H); Chronic obstructive pulmonary disease, unspecified COPD type (H)      !! order for DME Equipment being  "ordered: [id number]\" x2pair     \"20-30mmHg [place] Hybrid Liners\" x 2 pair  Qty: 2 Units, Refills: 11    Associated Diagnoses: Peripheral edema      !! order for DME Equipment being ordered: DEPENDS SIZE LARGE  Qty: 60 Month, Refills: 5    Associated Diagnoses: Mixed incontinence      !! order for DME Equipment being ordered: INCONTINENCE PADS   QID  Qty: 1 Month, Refills: 11    Associated Diagnoses: Other urinary incontinence      VITAMIN D3 1000 units tablet TAKE ONE TABLET BY MOUTH ONCE DAILY  Qty: 100 tablet, Refills: 0    Associated Diagnoses: Lymphedema of both lower extremities       !! - Potential duplicate medications found. Please discuss with provider.                Consultations:   No consultations were requested during this admission          Brief History of Illness:     From Admission H+P:     Bia Bill is a 53 year old female who presents after a bug bite.     She felt like something bit her on Wednesday. She noticed pain and itching initially. Redness developed surrounding the area and then continued to spread on her leg. She has not had any fevers or chills but has had a poor appetite with mild nausea. She has not really felt ill. She has had some diarrhea since that time. She went to her PT which she has been attending for hip pain following a fall. Her PT noted increased redness and felt she should be evaluated. She initially presented to the urgent care but was sent to the ED for further evaluation.                      TODAY:     Subjective:  Improving.  Still sore but getting better - still using a bit of dilaudid   No other pain.  No dyspnea.  No fever.         ROS:   ROS: 10 point ROS neg other than the symptoms noted above in the HPI.       /59 (BP Location: Right arm)   Pulse 82   Temp 98.6  F (37  C) (Oral)   Resp 16   Ht 1.499 m (4' 11\")   Wt 104 kg (229 lb 4.5 oz)   LMP 09/27/2009   SpO2 94%   BMI 46.31 kg/m     EXAM:  General: awake and alert, NAD, oriented x " 3  Head: normocephalic  Neck: unremarkable, no lymphadenopathy   HE ENT: oropharynx pink and moist    Heart: Regular rate and rhythm, no murmurs, rubs, or gallops  Lungs: clear to auscultation bilaterally with good air movement throughout  Abdomen: soft, non-tender, no masses or organomegaly  Extremities: no edema in lower extremities   Skin: erythema notably improved with clear notable regression from lines now today, no fluctuance, mildly tender but not markedly so, no worsened areas, skin otherwise unremarkable.            Hospital Course:       Bia Bill is a 53 year old female admitted on 6/6/2019. She has history of bipolar, erythrocytosis, DVT, PVD s/p stenting, gout, GILES, obesity hypoventilation, COPD and hypothyroidism. She presents with spreading redness and pain following a bug bite 2 days prior.     Cellulitis of the Right Lower extremity   6/7/19 -- Bug bite 2 days ago with worsening erythema, warmth, tenderness of the right lower extremity as well as some pruritis. Serous drainage initially, no purulence reported. Venous US negative for DVT. Afebrile, vital signs stable, no systemic signs of illness, WBC initially 13.8. Received one dose of ceftriaxone in ED. Erythema has spread to encompass thigh as well following presentation, re-outlined last night and now just beyond margin in one area.  - admit to inpatient  - antibiotics: change to IV cefazolin  - follow CBC, blood cultures  - monitor outlined area  - pain management: acetaminophen, prn hydromorphone (allergy to oxycodone though tolerated hydromorphone in the past discussed with patient, agreeable to try and monitor for symptoms)  6/8/2019 -- showing clear improvement on ancef, nothing on exam for deeper infection although patient still using a small amount of dilaudid - ok to discharge on keflex x 7 days with a few dilaudid.  Follow-up with primary care provider to reassess with in 1 week.         Peripheral Vascular Disease  Noted in  history of stenting in the left leg.  - continue home warfarin, statin       Bipolar Disorder / Depression  Follows with psychology, [facility].  Mood and affect stable here.   - continue home Lamictal, Abilify, bupropion, trazodone      Erythrocytosis  Follows with hematology, Dr. Watts, history of phlebotomy in the past (2011) to keep Hgb < 20, possibly Hb [place] based on previous work up.  - continue outpatient follow up     Gout  Chronic.  - continue home allopurinol     Gastroesophageal reflux disease  Chronic.  - continue home omeprazole     Polyneuropathy  Chronic.  - continue home gabapentin     History of DVT  Occurred in 2004 in her leg. Managed on warfarin.  - continue home warfarin, pharmacy to dose     Hypothyroidism  Chronic.   - continue home levothyroxine     COPD / Asthma  Tobacco use  Lungs clear on presentation.  - continue home nebulizer treatments prn, Dulera      Restless Legs  Chronic.  - continue home pramipexole     Morbid Obesity  Hypoventilation, GILES  Chronic. Managed with home CPAP.         DVT Prophylaxis: Warfarin    Code Status: DNR, ok to intubate                     Discharge Instructions and Follow-Up:     Discharge diet:       Regular Diet Adult       Discharge activity: Activity as tolerated   Discharge follow-up: Follow up with primary care provider within 7 days           Discharge Disposition:     Discharged to home      Attestation:  I have reviewed today's vital signs, notes, medications, labs and imaging.  Amount of time performed on this discharge summary: 45 minutes.    Dre Engle MD, MD        Imiquimod Counseling:  I discussed with the patient the risks of imiquimod including but not limited to erythema, scaling, itching, weeping, crusting, and pain.  Patient understands that the inflammatory response to imiquimod is variable from person to person and was educated regarded proper titration schedule.  If flu-like symptoms develop, patient knows to discontinue the medication and contact us.

## 2023-02-21 DIAGNOSIS — K21.9 GASTROESOPHAGEAL REFLUX DISEASE WITHOUT ESOPHAGITIS: ICD-10-CM

## 2023-02-21 DIAGNOSIS — I89.0 LYMPHEDEMA OF BOTH LOWER EXTREMITIES: ICD-10-CM

## 2023-02-22 RX ORDER — CHOLECALCIFEROL (VITAMIN D3) 25 MCG
TABLET ORAL
Qty: 90 TABLET | Refills: 0 | Status: SHIPPED | OUTPATIENT
Start: 2023-02-22 | End: 2023-05-24

## 2023-03-23 DIAGNOSIS — E78.00 HYPERCHOLESTEREMIA: ICD-10-CM

## 2023-03-23 RX ORDER — SIMVASTATIN 20 MG
TABLET ORAL
Qty: 30 TABLET | Refills: 6 | Status: SHIPPED | OUTPATIENT
Start: 2023-03-23 | End: 2023-10-18

## 2023-03-23 NOTE — TELEPHONE ENCOUNTER
"Routing refill request to provider for review/approval because:  Labs not current:  LDL  Patient needs to be seen because it has been more than 1 year since last office visit.      Requested Prescriptions   Pending Prescriptions Disp Refills    simvastatin (ZOCOR) 20 MG tablet [Pharmacy Med Name: SIMVASTATIN 20MG TABLET] 30 tablet 6     Sig: TAKE 1 TABLET BY MOUTH EVERY NIGHT AT BEDTIME       Statins Protocol Failed - 3/23/2023  1:03 AM        Failed - LDL on file in past 12 months     Recent Labs   Lab Test 06/22/20  0950   LDL 36             Failed - Recent (12 mo) or future (30 days) visit within the authorizing provider's specialty     Patient has had an office visit with the authorizing provider or a provider within the authorizing providers department within the previous 12 mos or has a future within next 30 days. See \"Patient Info\" tab in inbasket, or \"Choose Columns\" in Meds & Orders section of the refill encounter.              Passed - No abnormal creatine kinase in past 12 months     Recent Labs   Lab Test 11/30/15  1508                   Passed - Medication is active on med list        Passed - Patient is age 18 or older        Passed - No active pregnancy on record        Passed - No positive pregnancy test in past 12 months                 Bia Slater RN 03/23/23 9:37 AM   "

## 2023-04-20 NOTE — PLAN OF CARE
Patient alert, oriented. Complained of back pain due to fall. PRN tylenol given x 2. Ice applied to back. VBG's improving: PCO2 59, PO2 27, Bicarb 32, pH 7.34. Patient is aware that she needs to use BiPAP while sleeping. Up with SBA.    Vascular Attending: Dr. Palmira Kaplan Complaint: B/L cellultis       HPI:  71F PMH HTN, DVT (many yrs ago after childbirth, not on AC), chronic b/l LE lymphedema p/w concern for cellulitis. Pt states that she fell ~6 months ago and has been bedbound ever since. Was in several rehab facilities. Last hospitalization and abx ~5 months ago. Has chronic b/l LE edema/ulcers and redness. Pt states that she has visiting nurse twice a week and that today the nurse told her she should go to ER for likely cellulitis. Pt does note spreading redness and pain to her b/l thighs over lasts few days. States she/her family was in touch w/ Dr. Chavis and was referred to [facility] ED. On ROS pt notes chronic intermittent lower back pain. No other systemic symptoms.     Vascular surgery:  PT was seen at bedside with family at bedside. PT states that she had spoken with a medical group and was recommended to come to [facility]. VNS had came on friday for dressing changes. She said they come 2x every week. Since then they came again on monday and said that the swelling had progressively gotten worse and there is erythema on the shin and dorsum of foot. VNS told the patient to come to the hospital for IV abx and further management. PT foot was wrapped at bedside with xeroform, kerlix and ace. PT states that she was experiencing back pain and neck pain.     PAST MEDICAL & SURGICAL HISTORY:      REVIEW OF SYSTEMS  All neg unless otherwise stated.         MEDICATIONS  (STANDING):    MEDICATIONS  (PRN):      Allergies    No Known Allergies    Intolerances        SOCIAL HISTORY:    FAMILY HISTORY:      Vital Signs Last 24 Hrs  T(C): 36.9 (18 Apr 2023 22:11), Max: 36.9 (18 Apr 2023 22:11)  T(F): 98.5 (18 Apr 2023 22:11), Max: 98.5 (18 Apr 2023 22:11)  HR: 72 (18 Apr 2023 22:11) (72 - 76)  BP: 126/66 (18 Apr 2023 22:11) (126/66 - 169/106)  BP(mean): --  RR: 18 (18 Apr 2023 22:11) (18 - 18)  SpO2: 96% (18 Apr 2023 22:11) (94% - 96%)    Parameters below as of 18 Apr 2023 22:11  Patient On (Oxygen Delivery Method): room air        PHYSICAL EXAM:      Constitutional: NAD   Respiratory: RA  Cardiovascular: NSR  Gastrointestinal: soft, NT, ND   Vascular: B/L LE shin had weeping, multiple small superficial wound similar to open blisters from weeping. Erythematous from toes to knee. Dorsum of B/L foot edematous and erythematous. mild tenderness to palpation. good motor sensory function of foot. able to slightly bend the knee and able to bend ankle/ toes. LLE: palpable DP/ triphasic PT, RLE: triphasic DP/PT.         LABS:                        13.2   10.48 )-----------( 270      ( 18 Apr 2023 20:17 )             43.5     04-18    136  |  96  |  24<H>  ----------------------------<  134<H>  See Note   |  27  |  1.05    Ca    9.2      18 Apr 2023 20:17    TPro  8.6<H>  /  Alb  3.8  /  TBili  0.4  /  DBili  x   /  AST  See Note  /  ALT  See Note  /  AlkPhos  101  04-18    PT/INR - ( 18 Apr 2023 20:17 )   PT: 12.1 sec;   INR: 1.02          PTT - ( 18 Apr 2023 20:17 )  PTT:27.9 sec      RADIOLOGY & ADDITIONAL STUDIES    A/P: 71F PMH HTN, DVT (many yrs ago after childbirth, not on AC), chronic b/l LE lymphedema p/w concern for cellulitis. Pt states that she fell ~6 months ago and has been bedbound ever since. Was in several rehab facilities. Last hospitalization and abx ~5 months ago. Has chronic b/l LE edema/ulcers and redness. Pt states that she has visiting nurse twice a week and that today the nurse told her she should go to ER for likely cellulitis. Pt does note spreading redness and pain to her b/l thighs over lasts few days. vascular consulted for cellutitis       Vascular/PAD:  - no acute vascular surgical intervention   - please start iv antibiotics, IV vanc and zosyn   - do daily changes with xeroform/ kerlix/ ace wrap  - please keep LE elevated with pillows to above heart   - will continue to follow   - discussed with chief on call   - [phone]     Patient is a 71y old  Female who presents with a chief complaint of B/L LE cellulitis (2023 23:26)      HPI:  71F PMH HTN, DVT (many yrs ago after childbirth, not on AC), chronic b/l LE lymphedema p/w concern for cellulitis. Pt reports fall roughly 6 months ago, required PRASHANT, and has been bedbound since return home. Was in several rehab facilities. Last hospitalization and abx roughly 5 months ago. Has chronic b/l LE edema/ulcers and redness. Pt reports visiting nurse twice a week. While evaluated earlier today both visiting nurse and patient noted increased redness in B/L LE extending to mid, medial thigh. Notably patient reports edema is unchanged (also extending to medial mid thigh) with overlying chronic skin changes. Patient also notes family contacted Dr. Chavis and was referred to [facility] ED. On ROS pt notes chronic intermittent lower back pain. Patient evaluated by vacular surgery in ED, neurovacularly intact.     Initial vital signs: T: 98.5F, HR: 72-76, BP: 169/106 improved to 126/66 s/p pain control, R: 14, SpO2: 96% RA    Labs: significant for 10.48, Hgb 13.2, Plt 270, no L shift present, Cr 1.05, unknown baseline, lactate 1.7, BUN 24, TP 8.6, albumin 3.8, glucose 134,     CXR: poor inspiratory effort without conolidation. CPA clear B/L.      EKG: NSR with T wave inversions of II, III, aVF, currently asymptomatic. Qtc on admission 460ms    Medications: tylenol 650mg x1, morphine 2mg x1, zosyn 3.375g IVPB (22:00), vanco 1g IVPB 20:50)   (2023 23:26)    PAST MEDICAL & SURGICAL HISTORY:  HTN (hypertension)      Deep vein thrombosis (DVT)      Lymphedema of leg        MEDICATIONS  (STANDING):  allopurinol 100 milliGRAM(s) Oral daily  ammonium lactate 12% Lotion 1 Application(s) Topical every 12 hours  buMETAnide 1 milliGRAM(s) Oral every 12 hours  clotrimazole 1% Cream 1 Application(s) Topical every 12 hours  enoxaparin Injectable 40 milliGRAM(s) SubCutaneous every 12 hours  metoprolol succinate ER 50 milliGRAM(s) Oral daily  nystatin Powder 1 Application(s) Topical every 8 hours  piperacillin/tazobactam IVPB. 4.5 Gram(s) IV Intermittent once  piperacillin/tazobactam IVPB.- 4.5 Gram(s) IV Intermittent once  piperacillin/tazobactam IVPB.- 4.5 Gram(s) IV Intermittent once  potassium chloride    Tablet ER 20 milliEquivalent(s) Oral daily  spironolactone 25 milliGRAM(s) Oral daily  vancomycin  IVPB 2000 milliGRAM(s) IV Intermittent every 12 hours    MEDICATIONS  (PRN):  acetaminophen     Tablet .. 650 milliGRAM(s) Oral every 6 hours PRN Temp greater or equal to 38C (100.4F), Mild Pain (1 - 3)  melatonin 3 milliGRAM(s) Oral at bedtime PRN Insomnia            FAMILY HISTORY:      CBC Full  -  ( 2023 20:17 )  WBC Count : 10.48 K/uL  RBC Count : 4.98 M/uL  Hemoglobin : 13.2 g/dL  Hematocrit : 43.5 %  Platelet Count - Automated : 270 K/uL  Mean Cell Volume : 87.3 fl  Mean Cell Hemoglobin : 26.5 pg  Mean Cell Hemoglobin Concentration : 30.3 gm/dL  Auto Neutrophil # : 8.28 K/uL  Auto Lymphocyte # : 1.17 K/uL  Auto Monocyte # : 0.79 K/uL  Auto Eosinophil # : 0.16 K/uL  Auto Basophil # : 0.04 K/uL  Auto Neutrophil % : 79.0 %  Auto Lymphocyte % : 11.2 %  Auto Monocyte % : 7.5 %  Auto Eosinophil % : 1.5 %  Auto Basophil % : 0.4 %          137  |  99  |  23  ----------------------------<  109<H>  3.9   |  28  |  1.09    Ca    8.4      2023 05:30  Phos  3.8       Mg     2.2         TPro  6.8  /  Alb  3.4  /  TBili  0.5  /  DBili  x   /  AST  11  /  ALT  10  /  AlkPhos  77        Urinalysis Basic - ( 2023 07:00 )    Color: Yellow / Appearance: Clear / S.020 / pH: x  Gluc: x / Ketone: NEGATIVE  / Bili: Negative / Urobili: 0.2 E.U./dL   Blood: x / Protein: NEGATIVE mg/dL / Nitrite: NEGATIVE   Leuk Esterase: NEGATIVE / RBC: x / WBC x   Sq Epi: x / Non Sq Epi: x / Bacteria: x        Radiology :             REVIEW OF SYSTEMS:      CONSTITUTIONAL: No fever, weight loss, or fatigue  EYES: No eye pain, visual disturbances, or discharge  ENMT:  No difficulty hearing, tinnitus, vertigo; No sinus or throat pain  NECK: No pain or stiffness  BREASTS: No pain, masses, or nipple discharge  RESPIRATORY: No cough, wheezing, chills or hemoptysis; No shortness of breath  CARDIOVASCULAR: No chest pain, palpitations, dizziness, or leg swelling  GASTROINTESTINAL: No abdominal or epigastric pain. No nausea, vomiting, or hematemesis; No diarrhea or constipation. No melena or hematochezia.  GENITOURINARY: No dysuria, frequency, hematuria, or incontinence  NEUROLOGICAL: No headaches, memory loss, loss of strength, numbness, or tremors  SKIN: No itching, burning, rashes, or lesions   LYMPH NODES: No enlarged glands  ENDOCRINE: No heat or cold intolerance; No hair loss  MUSCULOSKELETAL: No joint pain or swelling; No muscle, back, or extremity pain  PSYCHIATRIC: No depression, anxiety, mood swings, or difficulty sleeping  HEME/LYMPH: No easy bruising, or bleeding gums  ALLERGY AND IMMUNOLOGIC: No hives or eczema  VASCULAR:  per hpi           Vital Signs Last 24 Hrs  T(C): 36.3 (2023 06:14), Max: 36.9 (2023 22:11)  T(F): 97.3 (2023 06:14), Max: 98.5 (2023 22:11)  HR: 86 (2023 07:48) (72 - 86)  BP: 130/75 (2023 07:48) (126/66 - 169/106)  BP(mean): 102 (2023 00:55) (102 - 102)  RR: 18 (2023 07:48) (16 - 19)  SpO2: 94% (2023 07:48) (92% - 96%)    Parameters below as of 2023 07:48  Patient On (Oxygen Delivery Method): room air            Physical Exam :  obese 71 y o woman lying comfortably in semi Isaacs's position , awake , alert , no acute complaints     Head : normocephalic , atraumatic    Eyes : PERRLA , EOMI , no nystagmus , sclera anicteric    ENT : neg nasal discharge , uvula midline , no oropharyngeal erythema / exudate    Neck : supple , negative JVD , negative carotid bruits , no thyromegaly    Chest : CTA bilaterally , neg wheeze / rhonchi / rales / crackles / egophany    Cardiovascular : regular rate and rhythm , neg murmurs / rubs / gallops    Abdomen : soft ,  distended , non tender to palpation in all 4 quadrants ,  normal bowel sounds     Extremities :  B/L LE ACE wraps     Neurologic Exam :    Alert and oriented  x 3     Motor Exam:          Right UE:               no focal weakness ,  > 3+/5 throughout                             Left UE:                 no focal weakness ,  > 3+/5 throughout         Right LE:                no focal weakness ,  > 3+/5 throughout        Left LE:                  no focal weakness ,  > 3+/5 throughout         Sensation :         intact to light touch x 4 extremities       DTR :                     biceps/brachioradialis : equal                              patella/ankle : equal      Gait :  not tested      PM&R Impression : admitted for c/o bilateral LE cellulitis    1) deconditioned    2) no focal weakness        Recommendations / Plan :      1) Physical / Occupational therapy focusing on therapeutic exercises , equipment evaluation , bed mobility/transfer out of bed evaluation , progressive ambulation with assistive devices prn .    2) Current disposition plan recommendation  :    pending functional progress , probable PRASHANT      Orthopaedic Surgery Consult Note    Attending Physician: Al[facility]  Consult requested by: Medicine    CC: L Hip Pain    HPI:  71F PMH HTN, DVT (many yrs ago after childbirth, not on AC), chronic b/l LE lymphedema p/w concern for cellulitis. Pt reports fall roughly 6 months ago, required PRASHANT, and has been bedbound since return home. Was in several rehab facilities. Last hospitalization and abx roughly 5 months ago. Has chronic b/l LE edema/ulcers and redness. Pt reports visiting nurse twice a week. While evaluated earlier today both visiting nurse and patient noted increased redness in B/L LE extending to mid, medial thigh. Notably patient reports edema is unchanged (also extending to medial mid thigh) with overlying chronic skin changes. Patient also notes family contacted Dr. Chavis and was referred to [facility] ED. On ROS pt notes chronic intermittent lower back pain. Patient evaluated by vacular surgery in ED, neurovacularly intact.     Allergies    No Known Allergies    Intolerances      PAST MEDICAL & SURGICAL HISTORY:  HTN (hypertension)      Deep vein thrombosis (DVT)      Lymphedema of leg          Meds:  acetaminophen     Tablet .. 650 milliGRAM(s) Oral every 6 hours PRN  allopurinol 100 milliGRAM(s) Oral daily  ammonium lactate 12% Lotion 1 Application(s) Topical every 12 hours  clotrimazole 1% Cream 1 Application(s) Topical every 12 hours  enoxaparin Injectable 150 milliGRAM(s) SubCutaneous every 12 hours  linezolid    Tablet 600 milliGRAM(s) Oral every 12 hours  melatonin 3 milliGRAM(s) Oral at bedtime PRN  metoprolol succinate ER 50 milliGRAM(s) Oral daily  nystatin Powder 1 Application(s) Topical every 8 hours  piperacillin/tazobactam IVPB.. 4.5 Gram(s) IV Intermittent every 8 hours  potassium chloride    Tablet ER 20 milliEquivalent(s) Oral daily      Family History:  Denies family history of bleeding disorders    Social History:   Pt is a nonsmoker  Social EtOH use     Review of Systems:  All review of systems are negative except for those mentioned in HPI.    Physical Exam:  General: Pt Alert and oriented, NAD, morbidly obese  AAOx_3  L buttock with stage 1 pressure ulcer. Mild L hip pain, no pain with log roll or axial loading.  b/l LE mild pitting edema +1, erythematous from ankle to upper leg decreased in extent from original demarcation nontender, soft, warm, no obvious purulence or bleeding noted  Pulses: 2+ dp, pt pulses, toes wwp, cap refill <3 seconds  Sensation: SILT sural/saph/sp/dp/ tibial distributions b/l LE  Motor: EHL/FHL/TA/GS firing equally b/l LE    Vital Signs Last 24 Hrs  T(C): 36.7 (20 Apr 2023 12:01), Max: 36.8 (20 Apr 2023 06:28)  T(F): 98.1 (20 Apr 2023 12:01), Max: 98.2 (20 Apr 2023 06:28)  HR: 67 (20 Apr 2023 12:01) (67 - 76)  BP: 115/72 (20 Apr 2023 12:01) (115/72 - 137/74)  BP(mean): --  RR: 18 (20 Apr 2023 12:01) (18 - 19)  SpO2: 93% (20 Apr 2023 12:01) (93% - 96%)    Parameters below as of 20 Apr 2023 12:01  Patient On (Oxygen Delivery Method): room air          Labs:                        11.7   7.49  )-----------( 255      ( 20 Apr 2023 18:36 )             39.1     04-20    138  |  100  |  21  ----------------------------<  99  4.3   |  29  |  1.28    Ca    8.8      20 Apr 2023 18:36  Phos  3.8     04-19  Mg     2.2     04-19    TPro  7.3  /  Alb  3.2<L>  /  TBili  0.4  /  DBili  x   /  AST  11  /  ALT  11  /  AlkPhos  75  04-20        Imaging:   CT pelvis: nondisplaced fx after L sacral ala    A/P: 71yFemale with L sacral ala fx s/p fall while being transported by nursing at [facility]  - No ortho surgical intervention at this time  - pain control  - WBAT  - f/u with [facility] outpatient on PRN basis  - PT/OT for mobilization  - daily dressing changes and wound care per vascular recs  - frequent positioning  - rest of care per primary team      Randell Osborn, PGY-2  Ortho Pager [phone]

## 2023-05-22 DIAGNOSIS — M10.9 ACUTE GOUTY ARTHRITIS: ICD-10-CM

## 2023-05-22 DIAGNOSIS — I89.0 LYMPHEDEMA OF BOTH LOWER EXTREMITIES: ICD-10-CM

## 2023-05-23 NOTE — TELEPHONE ENCOUNTER
Routing refill request to provider for review/approval because:  Due for a yearly visit with labs.   Vanessa Woodard RN on 5/23/2023 at 2:19 PM

## 2023-05-24 RX ORDER — ALLOPURINOL 300 MG/1
TABLET ORAL
Qty: 90 TABLET | Refills: 1 | Status: SHIPPED | OUTPATIENT
Start: 2023-05-24

## 2023-05-24 RX ORDER — CHOLECALCIFEROL (VITAMIN D3) 25 MCG
TABLET ORAL
Qty: 90 TABLET | Refills: 0 | Status: SHIPPED | OUTPATIENT
Start: 2023-05-24 | End: 2023-08-18

## 2023-05-31 NOTE — PROGRESS NOTES
Pt A&Ox4. VSS. Transitioned from Bipap to NC this am. Sating above >90% on 2L NC now. Tolerating well. Productive cough. Lung sounds coarse, crackles, and wheezing. Occasional c/o SOB. Lasix, duonebs, abxs, and solumedrol given as ordered. Advanced to regular diet. Eating a fair amount. No BM today. Voiding in commode. Up to bedroom. Denies pain. Pleasant and cooperative.    Bed/Stretcher in lowest position, wheels locked, appropriate side rails in place/Call bell, personal items and telephone in reach/Instruct patient to call for assistance before getting out of bed/chair/stretcher/Non-slip footwear applied when patient is off stretcher/[place] to call system/Physically safe environment - no spills, clutter or unnecessary equipment/Purposeful proactive rounding/Room/bathroom lighting operational, light cord in reach

## 2023-08-18 DIAGNOSIS — I89.0 LYMPHEDEMA OF BOTH LOWER EXTREMITIES: ICD-10-CM

## 2023-08-18 RX ORDER — CHOLECALCIFEROL (VITAMIN D3) 25 MCG
TABLET ORAL
Qty: 90 TABLET | Refills: 0 | Status: SHIPPED | OUTPATIENT
Start: 2023-08-18

## 2023-08-18 NOTE — TELEPHONE ENCOUNTER
"Routing refill request to provider for review/approval because:  Patient needs to be seen because it has been more than 1 year since last office visit.        Requested Prescriptions   Pending Prescriptions Disp Refills    VITAMIN D3 25 MCG (1000 UT) tablet [Pharmacy Med Name: VITAMIN D3 25MCG (1000IU) TAB] 90 tablet 0     Sig: TAKE 1 TABLET BY MOUTH ONCE DAILY       Vitamin Supplements (Adult) Protocol Failed - 8/18/2023  1:14 AM        Failed - Recent (12 mo) or future (30 days) visit within the authorizing provider's specialty     Patient has had an office visit with the authorizing provider or a provider within the authorizing providers department within the previous 12 mos or has a future within next 30 days. See \"Patient Info\" tab in inbasket, or \"Choose Columns\" in Meds & Orders section of the refill encounter.              Passed - High dose Vitamin D not ordered        Passed - Medication is active on med list                 Vanessa Woodard RN 08/18/23 7:47 AM    "

## 2023-10-04 ENCOUNTER — TELEPHONE (OUTPATIENT)
Dept: FAMILY MEDICINE | Facility: CLINIC | Age: 58
End: 2023-10-04
Payer: COMMERCIAL

## 2023-10-04 NOTE — TELEPHONE ENCOUNTER
Prior Authorization Retail Medication Request    Medication/Dose: Omeprazole 20mg  ICD code (if different than what is on RX):    Gastroesophageal reflux disease without esophagitis [K21.9]        Previously Tried and Failed:    Rationale:      Insurance Name:  [employer]  Covermymeds:  Key: [id number]  Last Name: Fly  : 1965      Pharmacy Information (if different than what is on RX)  Name: Request from [facility]  Phone:

## 2023-10-09 NOTE — TELEPHONE ENCOUNTER
Central Prior Authorization Team   Phone: [phone]    PA Initiation    Medication: OMEPRAZOLE 20 MG PO CPDR  Insurance Company: [employer] - Phone [phone] Fax [phone]  Pharmacy Filling the Rx: [id number] - [place], [place] - [address]  Filling Pharmacy Phone: [phone]  Filling Pharmacy Fax:    Start Date: 10/9/2023    Started PA on CMM and a response of Drug is covered by current benefit plan. No further PA activity needed.  Called pharmacy, they received a paid claim.

## 2023-10-18 DIAGNOSIS — E78.00 HYPERCHOLESTEREMIA: ICD-10-CM

## 2023-10-18 RX ORDER — SIMVASTATIN 20 MG
TABLET ORAL
Qty: 30 TABLET | Refills: 6 | Status: SHIPPED | OUTPATIENT
Start: 2023-10-18

## 2023-10-18 NOTE — TELEPHONE ENCOUNTER
"Routing refill request to provider for review/approval because:  See protocol         Requested Prescriptions   Pending Prescriptions Disp Refills    simvastatin (ZOCOR) 20 MG tablet [Pharmacy Med Name: SIMVASTATIN 20MG TABLET] 30 tablet 6     Sig: TAKE 1 TABLET BY MOUTH EVERY NIGHT AT BEDTIME       Statins Protocol Failed - 10/18/2023  1:05 PM        Failed - LDL on file in past 12 months     Recent Labs   Lab Test 06/22/20  0950   LDL 36             Failed - Recent (12 mo) or future (30 days) visit within the authorizing provider's specialty     Patient has had an office visit with the authorizing provider or a provider within the authorizing providers department within the previous 12 mos or has a future within next 30 days. See \"Patient Info\" tab in inbasket, or \"Choose Columns\" in Meds & Orders section of the refill encounter.              Passed - No abnormal creatine kinase in past 12 months     Recent Labs   Lab Test 11/30/15  1508                   Passed - Medication is active on med list        Passed - Patient is age 18 or older        Passed - No active pregnancy on record        Passed - No positive pregnancy test in past 12 months                 Dae Torres RN 10/18/23 1:07 PM   "

## 2023-11-01 ENCOUNTER — TELEPHONE (OUTPATIENT)
Dept: PSYCHOLOGY | Facility: CLINIC | Age: 58
End: 2023-11-01
Payer: COMMERCIAL

## 2023-11-01 PROCEDURE — 99207 PR NO BILLABLE SERVICE THIS VISIT: CPT | Performed by: PSYCHOLOGIST

## 2023-11-01 NOTE — CONFIDENTIAL NOTE
"Phone call from patient,  Having problems with her PCA. Not working out. Not doing her job. Supposed to be available when client if in shower. Watches TV in living room  Daughter is also \"borrowing\" money from her and asking her to buy food for her family with food stamps.     Sees Yisel Morrison at [facility] in [place], [place].   PCP is now Dr. Morocho.     Advised to contact PCA agency.    Set up appointment for next week.  "

## 2023-11-08 ENCOUNTER — VIRTUAL VISIT (OUTPATIENT)
Dept: PSYCHOLOGY | Facility: CLINIC | Age: 58
End: 2023-11-08
Payer: COMMERCIAL

## 2023-11-08 DIAGNOSIS — F31.62 MODERATE MIXED BIPOLAR I DISORDER (H): Primary | ICD-10-CM

## 2023-11-08 PROCEDURE — 90834 PSYTX W PT 45 MINUTES: CPT | Mod: 95 | Performed by: PSYCHOLOGIST
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
## 2023-11-09 ENCOUNTER — TELEPHONE (OUTPATIENT)
Dept: FAMILY MEDICINE | Facility: CLINIC | Age: 58
End: 2023-11-09
Payer: COMMERCIAL

## 2023-11-09 DIAGNOSIS — M10.9 ACUTE GOUTY ARTHRITIS: ICD-10-CM

## 2023-11-09 RX ORDER — ALLOPURINOL 300 MG/1
1 TABLET ORAL DAILY
Qty: 90 TABLET | Refills: 1 | OUTPATIENT
Start: 2023-11-09

## 2023-11-09 NOTE — TELEPHONE ENCOUNTER
No longer under provider care, see's Duran Morocho DO with [facility] in [place], [place].   Julie Behrendt RN

## 2023-11-09 NOTE — TELEPHONE ENCOUNTER
Patients daughter, Ludivina, called the clinic today. Consent on file.  She stated that her mother is no longer a patient of Dr. Leong but Dr. Leong keeps getting refill requests and approving them. Rn explained that this seems like a pharmacy issue. She stated they have told the pharmacy to send refill requests to Dr. Morocho instead of Dr. Leong but then the next time a refill was needed it switched back to Dr. Leongs name. She stated the pharmacy told her that Dr. Leong would have to send all of Elizabeths information to Dr. Morocho, RN asked if they explained how that's supposed to happen but she did not know.    Routed to Dr. Leong to see if he knows this process.   Vanessa Woodard RN on 11/9/2023 at 12:40 PM

## 2023-11-12 NOTE — TELEPHONE ENCOUNTER
If she wants Dr Morocho to fill prescriptions she needs to tell that to  and he needs to make new prescriptions.

## 2023-11-28 NOTE — RESULT ENCOUNTER NOTE
A1c and platelet count not normal but at goal, triglicerides and protein levels suggest eating too many sweets and not enough protein.  Try to add 4 ounces of protein 3 times a day to your diet, stop sugar containing foods.  Please notify.        Thank you. NADIA CANALES MD     PREOPERATIVE HISTORY AND PHYSICAL     HPI:   Kelsey Da Silva is a 39 year old female who presents today for preoperative consultation for risk stratification prior to planned septoplasty, bilateral inferior turbinate reduction, takedown of right kiley bullosa scheduled on 12/04/23 as requested by Dr. Toribio.    Patient with past medical history of Prediabetes, recent A1c at 6.0 controlled with diet.      Patient denies any prior history of CAD/MI, CHF, stroke, DM2, HTN, VTE, SAL, or CKD. Further denies any prior complications from surgery, problems with anesthesia, or recurrent skin infections.     Activity level: good  Prior cardiac studies: none  EtOH/tobacco: none    PAST HISTORIES:  History reviewed. No pertinent past medical history.  History reviewed. No pertinent surgical history.  Current Outpatient Medications   Medication Sig Dispense Refill   • metFORMIN (GLUCOPHAGE) 500 MG tablet Take 1 tablet by mouth daily (with breakfast). 90 tablet 0   • mupirocin (BACTROBAN) 2 % ointment Apply ointment to region of left nasal irritation three times daily for two weeks. 22 g 3   • azelastine (ASTELIN) 0.1 % nasal spray Spray 1 spray in each nostril 2 times daily. Use in each nostril as directed 30 mL 12   • cetirizine (ZyrTEC) 10 MG tablet Take 1 tablet by mouth daily. 30 tablet 1   • fluticasone (FLONASE) 50 MCG/ACT nasal spray Spray 1 spray in each nostril daily for 14 days. 16 g 1     No current facility-administered medications for this visit.     ALLERGIES:  No Known Allergies  History reviewed. No pertinent family history.  Social History     Tobacco Use   • Smoking status: Never   • Smokeless tobacco: Never   Vaping Use   • Vaping Use: never used   Substance Use Topics   • Alcohol use: Yes       I have reviewed the past medical history, family history, social history, medications and allergies listed in the medical record as obtained by my nursing staff and support staff and agree with  their documentation.    REVIEW OF SYSTEMS:     Comprehensive review of systems was reviewed and discussed with the patient, and was otherwise negative, except as noted in the history of present illness, above.    OBJECTIVE:    Visit Vitals  /70 (BP Location: RUE - Right upper extremity, Patient Position: Sitting, Cuff Size: Large Adult)   Pulse 80   Ht 5' 5\" (1.651 m)   Wt 120.1 kg (264 lb 12.8 oz)   LMP  (LMP Unknown)   SpO2 98%   BMI 44.07 kg/m²        Physical Examination:  General Appearance:  Alert, cooperative, in no acute distress, appears stated age.  Head:  Normocephalic, without obvious abnormality, atraumatic.  Eyes:  PERRLA, EOMi, conjunctivae/corneas clear  Ears:  Normal tympanic membranes and external ear canals bilaterally  Throat:  Lips, oral mucosa, and tongue normal; teeth and gums normal; no sores or lesions.  Neck:  Supple, symmetrical, trachea midline, no adenopathy. Thyroid has no enlargement/tenderness/nodules  Lungs:  Clear to auscultation bilaterally, respirations unlabored, no wheezing or rhonchi  Heart:  Regular rate and rhythm, S1 and S2 normal, no murmur, rub or gallop.  Abdomen:  Soft, non-tender, non-distended, bowel sounds active in all four quadrants, no masses, no HSM.  Extremities:  DP/PT pulses palpable bilaterally, no cyanosis, clubbing, or edema.  Skin:  Skin color, texture, turgor normal, no rashes or lesions.    Labs & Studies:    EKG:  NSR, no ST elevations or depressions, no arrhythmia.      Labs: CBC and BMP within normal limits.       ASSESSMENT:  Kelsey Da Silva is a 39 year old female who presents today for preoperative consultation for risk stratification prior to planned septoplasty, bilateral inferior turbinate reduction, takedown of right kiley bullosa scheduled on 12/04/23 as requested by Dr. Toribio.    Patient has no significant cardiopulmonary history and is asymptomatic from cardiac standpoint. Patient has a  functional capacity of 7  METS. RCRI score is 0, indicating a 0.4% risk of cardiac complications during surgery. These risks were discussed with patient and she wishes  to proceed with surgery.    PLAN:  - Preoperative labs ordered per surgeon's request and/or clinical necessity as determined by patient's medical history look stable, patient can proceed to surgery without further testing.    Medication management:  Stop NSAID's, vitamins and supplements 1 week before the procedure.    Madison Drew MD

## 2023-12-07 ENCOUNTER — VIRTUAL VISIT (OUTPATIENT)
Dept: PSYCHOLOGY | Facility: CLINIC | Age: 58
End: 2023-12-07
Payer: COMMERCIAL

## 2023-12-07 DIAGNOSIS — F31.62 MODERATE MIXED BIPOLAR I DISORDER (H): Primary | ICD-10-CM

## 2023-12-07 PROCEDURE — 90834 PSYTX W PT 45 MINUTES: CPT | Mod: 95 | Performed by: PSYCHOLOGIST
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
## 2023-12-29 ENCOUNTER — VIRTUAL VISIT (OUTPATIENT)
Dept: PSYCHOLOGY | Facility: CLINIC | Age: 58
End: 2023-12-29
Payer: COMMERCIAL

## 2023-12-29 DIAGNOSIS — F31.62 MODERATE MIXED BIPOLAR I DISORDER (H): Primary | ICD-10-CM

## 2023-12-29 PROCEDURE — 90834 PSYTX W PT 45 MINUTES: CPT | Mod: 95 | Performed by: PSYCHOLOGIST

## 2023-12-29 NOTE — PROGRESS NOTES
"    [facility]                                     Progress Note    Disclaimer: Voice recognition software was used to generate this note. As a result, wrong word or 'sound-a-like' substitutions may have occurred due to the inherent limitations of voice recognition software. There may be errors in the script that have gone undetected. Please consider this when interpreting information found in this chart.     Patient Name: Bia Bill  Date: December 29, 2023           Service Type: Individual      Session Start Time: 10:03 AM    Session End Time: 10:45 AM       Session Length: 45    Session #: 97  There has been demonstrated improvement in functioning while patient has been engaged in psychotherapy/psychological service- if withdrawn the patient would deteriorate and/or relapse.     Attendees: Client attended alone    Service Modality:  Phone Visit:      Provider verified identity through the following two step process.  Patient provided:  Patient is known previously to provider    Telephone Visit: The patient's condition can be safely assessed and treated via synchronous audio telemedicine encounter.      Reason for Audio Telemedicine Visit: Services only offered telehealth    Originating Site (Patient Location): Patient's home    Distant Site (Provider Location): Provider Remote Setting- Home Office    Consent:  The patient/guardian has verbally consented to:     1. The potential risks and benefits of telemedicine (telephone visit) versus in person care;    The patient has been notified of the following:      \"We have found that certain health care needs can be provided without the need for a face to face visit.  This service lets us provide the care you need with a phone conversation.       I will have full access to your [facility] medical record during this entire phone call.   I will be taking notes for your medical record.      Since this is like an office visit, we will bill " "your insurance company for this service.       There are potential benefits and risks of telephone visits (e.g. limits to patient confidentiality) that differ from in-person visits.?Confidentiality still applies for telephone services, and nobody will record the visit.  It is important to be in a quiet, private space that is free of distractions (including cell phone or other devices) during the visit.??      If during the course of the call I believe a telephone visit is not appropriate, you will not be charged for this service\"     Consent has been obtained for this service by care team member: Yes     Provider location: Off-Site      DATA  Interactive Complexity: No  Crisis: No        Progress Since Last Session (Related to Symptoms / Goals / Homework):   Symptoms: Improving taking care of her diabetes.    Homework: Achieved / completed to satisfaction      Episode of Care Goals: Satisfactory progress - MAINTENANCE (Working to maintain change, with risk of relapse); Intervened by continuing to positively reinforce healthy behavior choice      Current / Ongoing Stressors and Concerns:  Had a nice talk with her brother in [place], hadn't spoken since dad  in .          Treatment Objective(s) Addressed in This Session:   identify 2 strategies for more effectively managing Bipolar Disorder.     Review and process traumatic memories        Intervention:    CBT: behavioral activation  Support for recent trauma and subsequent need for life changes.       Assessments completed prior to visit:  The following assessments were completed by patient for this visit:  PHQ9:       2019    11:00 AM 2019     3:00 PM 2020    12:00 PM 2020     2:43 PM 2020     2:00 PM 2020     2:00 PM 3/17/2020     1:00 PM   PHQ-9 SCORE   PHQ-9 Total Score 11 13 10 11 9 8 8     GAD7:       10/1/2019     4:00 PM 2019    11:00 AM 2019     3:00 PM 2020    12:00 PM 2020     2:00 PM 2020     2:00 " PM 3/17/2020     1:00 PM   CHIN-7 SCORE   Total Score 9 10 10 8 7 5 7     CAGE-AID:        No data to display              PROMIS 10-Global Health (all questions and answers displayed):        No data to display              [place] Suicide Severity Rating Scale (Lifetime/Recent)      12/18/2018    12:49 AM 1/4/2019     8:43 AM 2/28/2020     2:00 PM   [place] Suicide Severity Rating (Lifetime/Recent)   Q1 Wish to be Dead (Lifetime)   Yes   Q2 Non-Specific Active Suicidal Thoughts (Lifetime)   Yes   Most Severe Ideation Rating (Lifetime)   5   Frequency (Lifetime)   3   Duration (Lifetime)   4   Controllability (Lifetime)   0   Protective Factors  (Lifetime)   5   Reasons for Ideation (Lifetime)   1   Q1 Wished to be Dead (Past Month) no no    Q2 Suicidal Thoughts (Past Month) no no    Q6 Suicide Behavior (Lifetime) no no    RETIRED: 1. Wish to be Dead (Recent)   No   RETIRED: 2. Non-Specific Active Suicidal Thoughts (Recent)   No   3. Active Suicidal Ideation with any Methods (Not Plan) Without Intent to Act (Lifetime)   Yes   Comments   See above   RETIRED: 3. Active Suicidal Ideation with any Methods (Not Plan) Without Intent to Act (Recent)   No   RETIRE: 4. Active Suicidal Ideation with Some Intent to Act, Without Specific Plan (Lifetime)   Yes   4. Active Suicidal Ideation with Some Intent to Act, Without Specific Plan (Recent)   No   RETIRE: 5. Active Suicidal Ideation with Specific Plan and Intent (Lifetime)   Yes   Comments   Cutting   RETIRED: 5. Active Suicidal Ideation with Specific Plan and Intent (Recent)   No   Most Severe Ideation Rating (Past Month)   NA   Comments   None   Frequency (Past Month)   NA   Duration (Past Month)   NA   Controllability (Past Month)   NA   Protective Factors (Past Month)   NA   Reasons for Ideation (Past Month)   NA   Actual Attempt (Lifetime)   Yes   Comments   Meds for suicide attempts 17, 14, 8 years ago all by cutting   Comments   3   Actual Attempt (Past 3 Months)    No   Has subject engaged in non-suicidal self-injurious behavior? (Lifetime)   Yes   Has subject engaged in non-suicidal self-injurious behavior? (Past 3 Months)   No   Interrupted Attempts (Lifetime)   Yes   Comments   1   Interrupted Attempts (Past 3 Months)   No   Aborted or Self-Interrupted Attempt (Lifetime)   Yes   Comments   Decided to stick around for her daughter   Comments   1   Aborted or Self-Interrupted Attempt (Past 3 Months)   No   Preparatory Acts or Behavior (Lifetime)   Yes   Comments   Finding sharp instruments for cutting.   Preparatory Acts or Behavior (Past 3 Months)   No   Comments   8 years ago   Most Recent Attempt Actual Lethality Code   2   Most Lethal Attempt Actual Lethality Code   2   Comments   17 years ago   Initial/First Attempt Actual Lethality Code   2         ASSESSMENT: Current Emotional / Mental Status (status of significant symptoms):   Risk status (Self / Other harm or suicidal ideation)   Patient denies current fears or concerns for personal safety.   Patient denies current or recent suicidal ideation or behaviors.   Patient denies current or recent homicidal ideation or behaviors.   Patient denies current or recent self injurious behavior or ideation.   Patient denies other safety concerns.   Patient reports there has been no change in risk factors since their last session.     Patient reports there has been no change in protective factors since their last session.     Recommended that patient call 911 or go to the local ED should there be a change in any of these risk factors.     Appearance:   Unable to assess    Eye Contact:   Unable to assess    Psychomotor Behavior: Unable to assess    Attitude:   Cooperative  Interested   Orientation:   All   Speech    Rate / Production: Normal/ Responsive Normal     Volume:  Normal    Mood:    Normal frustrated   Affect:    Appropriate    Thought Content:  Clear    Thought Form:  Coherent  Logical    Insight:    Good      Medication  Review:   Changes to psychiatric medications, see updated Medication List in EPIC.      Medication Compliance:   Yes     Changes in Health Issues:   No cigs in 2 years!     Chemical Use Review:   Substance Use: Chemical use reviewed, no active concerns identified      Tobacco Use: No current tobacco use.      Diagnosis:  1. Moderate mixed bipolar I disorder (H)        Collateral Reports Completed:   Not Applicable    PLAN: (Patient Tasks / Therapist Tasks / Other)  Client to consult with PCP and psychiatry as necessary.        Kd Morris, PhD                                                         ______________________________________________________________________    Individual Treatment Plan    Patient's Name: Bia Bill  YOB: 1965    Date of Creation: 10/11/21  Date Treatment Plan Last  Reviewed/Revised: 11/9/22,12/7/23    DSM5 Diagnoses: Diagnoses: 296.40 Bipolar I Disorder Current or Most Recent Episode Manic, Unspecified  Psychosocial & Contextual Factors: financial difficulties, chronic pain    PROMIS (reviewed every 90 days):     Referral / Collaboration:  Referral to another professional/service is not indicated at this time..    Anticipated number of session for this episode of care: 60  Anticipation frequency of session: Monthly  Anticipated Duration of each session: 38-52 minutes  Treatment plan will be reviewed in 90 days or when goals have been changed.       Bipolar Disorder  Treatment plan:  Education- the Biopsychosocial model of Bipolar Disorder    Client will be able to describe in general terms what Bipolar Disorder  is and is not  Client will be able to describe how Bipolar Disorder  has affected their life in at least two different areas, such as school or work and Home/relationships  Clients parents/guardians or significant others will be provided information on what Bipolar Disorder  is and the ways it can affect relationships and be encouraged to be a part of  clients treatment team.  Medication  Client will participate in medication evaluation for Bipolar Disorder  symptoms and follow medication recommendations.   Identification and management of triggers  Client and therapist will examine patient s history to determine if there are predictable triggers for manic or depressive episodes (e.g. boredom, anger, family stress)  Client and therapist will develop means of diffusing these triggers (e.g. relaxation strategies, boundary setting, anger management)  Comorbid conditions   Client and therapist will asses for comorbid conditions (e.g. anxiety, depression, substance use). and add additional items to treatment plan as needed  Self-care  Client will identify 3 things they can do just for themselves  Client will take time for quiet, reflection, meditation 3 times per week  Client will Learn to set boundaries when appropriate  Client will Identify 2 individuals they can call on for support, distraction  Behavioral Activation  Client will Identify two forms of exercise/activity and engage in them 3 times per week  Client will Identify 2 things that make them laugh, and engage in these 3 times per week.  Client will Identify 2 Creative activities or hobbies  and engage in them 2 times per week  Client will identify music, movies, books that make them feel good and use them 3 times per week  Assessment of progress  Client will engage in assessment of their progress on a regular basis  Patient has reviewed and agreed to the above plan.      Kd Morris, PhD  March 22, 2022Has appointment for cardiac stress test  And

## 2024-01-01 NOTE — PROGRESS NOTES
ANTICOAGULATION FOLLOW-UP CLINIC VISIT    Patient Name:  Bia Bill  Date:  10/26/2017  Contact Type:  Telephone    SUBJECTIVE:     Patient Findings     Positives Intentional hold of therapy    Comments Patient called [facility] back, she was instructed to hold her warfarin until after her colonoscopy. She has another MD appointment on the 8th.            OBJECTIVE    INR   Date Value Ref Range Status   10/20/2017 1.20 (H) 0.86 - 1.14 Final       ASSESSMENT / PLAN  No question data found.  Anticoagulation Summary as of 10/26/2017     INR goal 2.0-3.0   Today's INR No new INR was available at the time of this encounter.   Maintenance plan 10 mg (5 mg x 2) every day   Full instructions 10/26: Hold; 10/27: Hold; 10/28: Hold; 10/29: Hold; 10/30: Hold; 10/31: Hold; 11/1: Hold; 11/2: Hold; 11/3: Hold; 11/4: Hold; 11/5: Hold; 11/6: Hold; Otherwise 10 mg every day   Weekly total 70 mg   Plan last modified Brenna Rausch RN (9/29/2017)   Next INR check 11/8/2017   Priority INR   Target end date     Indications   Long term current use of anticoagulant therapy [Z79.01]  DVT (deep venous thrombosis) (H) [I82.409]  PVD (peripheral vascular disease) with claudication (H) [I73.9]         Anticoagulation Episode Summary     INR check location     Preferred lab     Send INR reminders to [facility] [facility]    Comments * lab draw due to elevated hematocrit (fingerstick meters don't work). LEFT LE. STENT x 3 LEFT UPPER LEG. Previous arterial clot.      Anticoagulation Care Providers     Provider Role Specialty Phone number    Kd Leong MD [facility] [phone]            See the Encounter Report to view Anticoagulation Flowsheet and Dosing Calendar (Go to Encounters tab in chart review, and find the Anticoagulation Therapy Visit)        Angelina Nj RN               
Writer left a non-detailed message for patient to call [facility] back. Patient saw her PCP yesterday (notes not yet complete) for a post-hospital visit. Writer is wondering if patient was given the okay to restart her warfarin. Also, patient has a colonoscopy scheduled for Nov 7th. Will wait for patient to return [facility]'s call to discuss the plan.    Angelina Nj RN  Anticoagulation Clinic  [phone]   
SCM

## 2024-01-24 NOTE — PROGRESS NOTES
[facility]  Occupational Therapy Treatment Note/Discharge Summary    Date: 1/24/2024  Name: Hermelindo Garza III  Clinic Number: [id number]    Therapy Diagnosis:   Encounter Diagnosis   Name Primary?    Impaired range of motion of left elbow Yes     Physician: Christen Marshall MD    Physician Orders: OT Eval and Treat  Medical Diagnosis: S53.20XA (ICD-10-CM) - Partial tear of radial collateral ligament of elbow; M77.12 (ICD-10-CM) - Left lateral epicondylitis   Surgical Procedure and Date:   1. Left elbow lateral epicondyle debridement with extensor tendon repair, cpt 31892  2. Left elbow lateral radial collateral ligament reconstruction, cpt 15239, 12/5/23 / Date of Injury/Onset: progressive onset   Evaluation Date: 12/14/2023  Insurance Authorization Period Expiration: 1/31/2024  Plan of Care Expiration: 3/8/24 (12 weeks)  Date of Return to MD: 3/4/2024   Visit # / Visits authorized: 3 / 20 (Visit # 7)  FOTO: N/A    Precautions: Standard, Diabetes, [place] pgs. 351 & 490     Time In: 11:00 am  Time Out: 11:41 am  Total Billable Time: 41 minutes    SUBJECTIVE     Pt reports: He feels ready for discharge and prefers to perform HEP independently.   He was compliant with home exercise program given last session.   Response to previous treatment: Good - improving motion   Functional change: was able to perform pool exercises with weights no issue    Pain: 0/10  Location: left elbow    OBJECTIVE   Objective Measures updated at progress report unless specified.    Observation/Appearance: Incision well healed.      Edema. No significant edema noted.      Elbow and Wrist ROM. Measured in degrees.    12/14/2023 1/9/2024 1/16/2024 1/24/2024     Left* Left Left Left   Elbow Ext/Flex (pronated forearm) -30/100 -20/130 (+10/+30) -15/130 -10/130   Supination/Pronation 0/WFL 70/75 70/80    Wrist Ext/Flex 35/neutral 62/60 65/70    *With elbow flexed and arm adducted      Hand ROM. WFL - Pt able to  [facility] Internal Medicine Progress Note     Date of Service (when I saw the patient): 01/02/2021    REASON FOR ADMISSION / INTERVAL HISTORY:  Bia Bill is a 55 year old female who presents on 12/31/2020 with SOB.    ASSESSMENT/PLAN:     Acute respiratory failure with hypoxia and hypercapnia   Likely due to obesity hypoventilation syndrome/ diastolic CHF. Self admitted herself for SOB while going to labs/ clinics for care. VBG showed  7.26, PCO2 80, bicarb 36. Patient was placed on BIPAP. Improved with diuresis. Off BIPAP in daytime-uses as at home at night. Just started using home BIPAP since last admission 11/20  -continue rx as below.     Mild persistent asthma/COPD   Stable. Does not seem to be in exacerbation..   - continue Scheduled duonebs every 4 hours while awake.    - Continue prior to admission dulera (sub is breo ellipta).      Acute exacerbation of  diastolic heart failure/ R heart failure/ pulm HTN  Last ECHO 11/20 showed moderately reduced R hrt fx and mod pulm HTN.  Patient managed prior to admission with lasix 60 mg in am and 40 mg in afternoon. CXR showed vascular congestion. CT 11/20 had shown hepatic congestion/ mild ascitis. Started on lasix 40mg iv q8 hrs. Wt unchanged, cr stable.    - continue IV furosemide diuresis  .   Dizziness/near syncope   On admission-resolved. Stop tele.       History of deep venous thrombosis, positive lupus anticoagulant    - Continue prior to admission warfarin, pharmacy to dose.       Hypothyroidism   - Continue prior to admission levothyroxine.       GILES (Obstructive Sleep Apnea)/ Obesity Hypoventilation   - Continue home BIPAP.       Moderate mixed bipolar I disorder   Personality disorder, depressive   - Continue prior to admission trazodone, Lamictal, Abilify, Wellbutrin.      Chronic gout without tophus   - Continue prior to admission allopurinol.      Lymphedema of both lower extremities  She reports her edema has continued to improve. She  "had a visit today at the lymphedema clinic.    - Continue outpatient lymphedema treatment.      RLS   - Continue prior to admission mirapex.       Type 2 diabetes mellitus   Managed with diet. Not on medications at baseline.    - ISS.   - Moderate CHO diet.. -190. a1c 11/20-5.6      Thrombocytopenia, chronic   Admit platelet count of 110. Baseline appears to be 112-140. Stable.   - Continue to monitor.      Hyperlipidemia   - Continue prior to admission simvastatin.      GERD   - Continue prior to admission omeprazole.      COVID ruled out  Patient was tested in ED, rapid test was NEGATIVE.     DISPO  Will be in hospital 1-2-days    VICKI DELAROSA MD   Pg [phone]    DVT Prhylaxis: Warfarin  Code Status: Full Code    ROS:  As described in A/P and Exam.  Otherwise ALL are  negative.    PHYSICAL EXAM:  All vitals have been reviewed    Blood pressure 127/53, pulse 79, temperature 97.6  F (36.4  C), temperature source Oral, resp. rate 18, height 1.473 m (4' 10\"), weight 100 kg (220 lb 7.4 oz), last menstrual period 09/27/2009, SpO2 93 %, not currently breastfeeding.    I/O this shift:  In: 240 [P.O.:240]  Out: 950 [Urine:950]    GENERAL APPEARANCE: obese , alert and no distress  EYES: conjunctiva clear, eyes grossly normal  HENT: external ears and nose normal   NECK: supple, no masses or adenopathy  RESP: lungs-B crackles 2/3rds up bases- no rales, rhonchi or wheezes  CV: regular rate and rhythm, normal S1 S2, no S3 or S4 and no murmur, click or rub   ABDOMEN: soft, nontender, no HSM or masses and bowel sounds normal  MS: no clubbing, cyanosis; 1-2 edema  SKIN: clear without significant rashes or lesions  NEURO: -non-focal moves all 4 extr    ROUTINE  LABS (Last four results)  CMP  Recent Labs   Lab 01/02/21  0513 01/01/21  0545 12/31/20  1345    140 139   POTASSIUM 3.1* 3.7 3.4   CHLORIDE 102 104 102   CO2 36* 36* 34*   ANIONGAP 1* <1* 3   * 130* 124*   BUN 17 14 11   CR 0.72 0.72 0.69 " make full composite fists with B/L hands and demonstrates thumb opposition WFL.      Strength (Dynamometer - Rung II)   Measured in pounds to POP.    1/24/2024 1/24/2024     Left Right   Elbow flex 115 110   Elbow ext 102 110      Sensation. Intact per report.      Manual Muscle Test    1/24/2024     Left   Wrist Extension  5/5   Wrist Flexion 5/5    Radial Deviation  5/5   Ulnar Deviation  5/5   Supination  5/5   Pronation  5/5   Elbow Extension 5/5    Elbow Flexion  5/5      Treatment     Hermelindo received the treatments listed below:     Manual therapy techniques: Soft tissue Mobilization and Friction Massage were applied to the: L elbow for 8 minutes, including:  - Scar massage to incision to decrease adhesions and improve tensile glide.   - Performed soft tissue mobilization/massage to forearm and triceps to relieve associated soft tissue tightness, improve joint mobility,      Therapeutic exercises to develop endurance, ROM, and flexibility for 33 minutes, including:  - Updated objective measurements, see above.   - Theraputty HEP, green: gripping in elbow flex/ext positions x 2 min each   - ASTYM stretch position 1, 3 x 30 sec holds   - Wrist PRE 3 ways 2# db 2 x 10 reps each   - UBE on 2.2 resistance x 8 min forward/back directions to increase UE strength and endurance.     Patient Education and Home Exercises      Education provided:   - HEP in place, added: putty HEP, wrist PRE, ASTYM stretches to tolerance.  - Progress towards goals     Written Home Exercises Provided: yes.  Exercises were reviewed and Hermelindo was able to demonstrate them prior to the end of the session.  Hermelindo demonstrated good  understanding of the HEP provided. See EMR under Patient Instructions for exercises provided during therapy sessions.      ASSESSMENT     Hermelindo had good tolerance to treatment this date. Luis has attended outpatient OT services since 12/14/2023 (6 weeks) and has met all goals at this time. Pt has made    GFRESTIMATED >90 >90 >90   GFRESTBLACK >90 >90 >90   HOOD 8.6 8.7 8.8     CBC  Recent Labs   Lab 01/02/21  0513 01/01/21  0545 12/31/20  1345 12/31/20  1210   WBC 6.3 6.3 7.2  --    RBC 4.35 4.57 4.93  --    HGB 10.4* 10.9* 11.7  --    HCT 37.5 39.5 42.1 40.6   MCV 86 86 85  --    MCH 23.9* 23.9* 23.7*  --    MCHC 27.7* 27.6* 27.8*  --    RDW 20.3* 20.4* 20.9*  --    * 95* 110*  --      INR  Recent Labs   Lab 01/02/21  0513 01/01/21  0545 12/31/20  1210   INR 1.92* 3.26* 3.94*     Arterial Blood Gas  Recent Labs   Lab 01/01/21  0545 12/31/20  1623   O2PER Ra 2L       No results found for this or any previous visit (from the past 24 hour(s)).     significant gains with therapy, demonstrating return to PLOF, with increased ROM, decreased pain, and strength WFLs. Pt reports he has no difficulty performing ADLs and IADLs at home and adamantly prefers to continue improving at home using home program. He is independent with HEP and is appropriate for discharge from OT services at this time.     Pt's spiritual, cultural and educational needs considered and pt agreeable to plan of care and goals.    Goals:   The following goals were discussed with the patient and patient is in agreement with them as to be addressed in the treatment plan.   Long Term Goals (LTGs); to be met by discharge.  LTG #1: Pt will report a pain level of 1-3 out of 10 at worst with arm use. Met 1/24/2024  LTG #2: FOTO to be administered and assessed next session with updates to goals as needed. Discontinued   LTG #3: Pt will return to prior level of function for IADLs and household management. Met 1/24/2024  LTG #4: Assess MMT and  strength when appropriate and update goals as needed with focus on return to swimming and gym exercises. Met 1/24/2024     Short Term Goals (STGs); to be met within 4 weeks (1/12/24).  STG #1: Pt will report a pain level of 4 out of 10 at worst with arm use. Met 1/16/2024  STG #2: Pt will report/demo Cloud with ADLs. Met 1/16/2024  STG #3: Pt will demonstrate independence with issued HEP, orthosis wear, and modalities for pain/symptom management. Met 1/24/2024  STG #4: Pt will demo improved L elbow SURESH by 30-60 degrees needed to aid with buttoning collar. Met 1/9/2024  STG #5: Pt will demo improved L forearm SURESH by 30-40 degrees needed to aid with showering. Met 1/9/2024    Discharge reason: Patient has met all of his/her goals and Patient requested discharge    PLAN     This patient is discharged from Outpatient Occupational Therapy Services.     HERON Velazquez/TOMÁS

## 2024-01-26 ENCOUNTER — VIRTUAL VISIT (OUTPATIENT)
Dept: PSYCHOLOGY | Facility: CLINIC | Age: 59
End: 2024-01-26
Payer: COMMERCIAL

## 2024-01-26 DIAGNOSIS — F31.62 MODERATE MIXED BIPOLAR I DISORDER (H): Primary | ICD-10-CM

## 2024-01-26 PROCEDURE — 90834 PSYTX W PT 45 MINUTES: CPT | Mod: 93 | Performed by: PSYCHOLOGIST

## 2024-01-26 NOTE — PROGRESS NOTES
"    [facility]                                     Progress Note    Disclaimer: Voice recognition software was used to generate this note. As a result, wrong word or 'sound-a-like' substitutions may have occurred due to the inherent limitations of voice recognition software. There may be errors in the script that have gone undetected. Please consider this when interpreting information found in this chart.     Patient Name: Bia Bill  Date: January 26, 2024             Service Type: Individual      Session Start Time: 10:02 AM    Session End Time: 10:45 AM       Session Length: 45    Session #: 98  There has been demonstrated improvement in functioning while patient has been engaged in psychotherapy/psychological service- if withdrawn the patient would deteriorate and/or relapse.     Attendees: Client attended alone    Service Modality:  Phone Visit:      Provider verified identity through the following two step process.  Patient provided:  Patient is known previously to provider    Telephone Visit: The patient's condition can be safely assessed and treated via synchronous audio telemedicine encounter.      Reason for Audio Telemedicine Visit: Services only offered telehealth    Originating Site (Patient Location): Patient's home    Distant Site (Provider Location): Provider Remote Setting- Home Office    Consent:  The patient/guardian has verbally consented to:     1. The potential risks and benefits of telemedicine (telephone visit) versus in person care;    The patient has been notified of the following:      \"We have found that certain health care needs can be provided without the need for a face to face visit.  This service lets us provide the care you need with a phone conversation.       I will have full access to your [facility] medical record during this entire phone call.   I will be taking notes for your medical record.      Since this is like an office visit, we will bill " "your insurance company for this service.       There are potential benefits and risks of telephone visits (e.g. limits to patient confidentiality) that differ from in-person visits.?Confidentiality still applies for telephone services, and nobody will record the visit.  It is important to be in a quiet, private space that is free of distractions (including cell phone or other devices) during the visit.??      If during the course of the call I believe a telephone visit is not appropriate, you will not be charged for this service\"     Consent has been obtained for this service by care team member: Yes     Provider location: Off-Site      DATA  Interactive Complexity: No  Crisis: No        Progress Since Last Session (Related to Symptoms / Goals / Homework):   Symptoms: Improving taking care of her diabetes.    Homework: Achieved / completed to satisfaction      Episode of Care Goals: Satisfactory progress - MAINTENANCE (Working to maintain change, with risk of relapse); Intervened by continuing to positively reinforce healthy behavior choice      Current / Ongoing Stressors and Concerns:  Had a nice talk with her brother in [place], hadn't spoken since dad  in .   Had a fall a couple weeks ago, leg had gone numb, thought it got better, tried to get up, fell and hurt top of her foot.   Trying to stay out of the drama in her building.       Treatment Objective(s) Addressed in This Session:   identify 2 strategies for more effectively managing Bipolar Disorder.     Review and process traumatic memories        Intervention:    CBT: behavioral activation  Support for recent trauma and subsequent need for life changes.       Assessments completed prior to visit:  The following assessments were completed by patient for this visit:  PHQ9:       2019    11:00 AM 2019     3:00 PM 2020    12:00 PM 2020     2:43 PM 2020     2:00 PM 2020     2:00 PM 3/17/2020     1:00 PM   PHQ-9 SCORE   PHQ-9 " Total Score 11 13 10 11 9 8 8     GAD7:       10/1/2019     4:00 PM 11/7/2019    11:00 AM 11/20/2019     3:00 PM 1/6/2020    12:00 PM 2/18/2020     2:00 PM 2/28/2020     2:00 PM 3/17/2020     1:00 PM   CHIN-7 SCORE   Total Score 9 10 10 8 7 5 7     CAGE-AID:        No data to display              PROMIS 10-Global Health (all questions and answers displayed):        No data to display              [place] Suicide Severity Rating Scale (Lifetime/Recent)      12/18/2018    12:49 AM 1/4/2019     8:43 AM 2/28/2020     2:00 PM   [place] Suicide Severity Rating (Lifetime/Recent)   Q1 Wish to be Dead (Lifetime)   Yes   Q2 Non-Specific Active Suicidal Thoughts (Lifetime)   Yes   Most Severe Ideation Rating (Lifetime)   5   Frequency (Lifetime)   3   Duration (Lifetime)   4   Controllability (Lifetime)   0   Protective Factors  (Lifetime)   5   Reasons for Ideation (Lifetime)   1   Q1 Wished to be Dead (Past Month) no no    Q2 Suicidal Thoughts (Past Month) no no    Q6 Suicide Behavior (Lifetime) no no    RETIRED: 1. Wish to be Dead (Recent)   No   RETIRED: 2. Non-Specific Active Suicidal Thoughts (Recent)   No   3. Active Suicidal Ideation with any Methods (Not Plan) Without Intent to Act (Lifetime)   Yes   Comments   See above   RETIRED: 3. Active Suicidal Ideation with any Methods (Not Plan) Without Intent to Act (Recent)   No   RETIRE: 4. Active Suicidal Ideation with Some Intent to Act, Without Specific Plan (Lifetime)   Yes   4. Active Suicidal Ideation with Some Intent to Act, Without Specific Plan (Recent)   No   RETIRE: 5. Active Suicidal Ideation with Specific Plan and Intent (Lifetime)   Yes   Comments   Cutting   RETIRED: 5. Active Suicidal Ideation with Specific Plan and Intent (Recent)   No   Most Severe Ideation Rating (Past Month)   NA   Comments   None   Frequency (Past Month)   NA   Duration (Past Month)   NA   Controllability (Past Month)   NA   Protective Factors (Past Month)   NA   Reasons for Ideation  (Past Month)   NA   Actual Attempt (Lifetime)   Yes   Comments   Meds for suicide attempts 17, 14, 8 years ago all by cutting   Comments   3   Actual Attempt (Past 3 Months)   No   Has subject engaged in non-suicidal self-injurious behavior? (Lifetime)   Yes   Has subject engaged in non-suicidal self-injurious behavior? (Past 3 Months)   No   Interrupted Attempts (Lifetime)   Yes   Comments   1   Interrupted Attempts (Past 3 Months)   No   Aborted or Self-Interrupted Attempt (Lifetime)   Yes   Comments   Decided to stick around for her daughter   Comments   1   Aborted or Self-Interrupted Attempt (Past 3 Months)   No   Preparatory Acts or Behavior (Lifetime)   Yes   Comments   Finding sharp instruments for cutting.   Preparatory Acts or Behavior (Past 3 Months)   No   Comments   8 years ago   Most Recent Attempt Actual Lethality Code   2   Most Lethal Attempt Actual Lethality Code   2   Comments   17 years ago   Initial/First Attempt Actual Lethality Code   2         ASSESSMENT: Current Emotional / Mental Status (status of significant symptoms):   Risk status (Self / Other harm or suicidal ideation)   Patient denies current fears or concerns for personal safety.   Patient denies current or recent suicidal ideation or behaviors.   Patient denies current or recent homicidal ideation or behaviors.   Patient denies current or recent self injurious behavior or ideation.   Patient denies other safety concerns.   Patient reports there has been no change in risk factors since their last session.     Patient reports there has been no change in protective factors since their last session.     Recommended that patient call 911 or go to the local ED should there be a change in any of these risk factors.     Appearance:   Unable to assess    Eye Contact:   Unable to assess    Psychomotor Behavior: Unable to assess    Attitude:   Cooperative  Interested   Orientation:   All   Speech    Rate / Production: Normal/ Responsive Normal      Volume:  Normal    Mood:    Normal frustrated   Affect:    Appropriate    Thought Content:  Clear    Thought Form:  Coherent  Logical    Insight:    Good      Medication Review:   Changes to psychiatric medications, see updated Medication List in EPIC.      Medication Compliance:   Yes     Changes in Health Issues:   No cigs in 2 years!     Chemical Use Review:   Substance Use: Chemical use reviewed, no active concerns identified      Tobacco Use: No current tobacco use.      Diagnosis:  1. Moderate mixed bipolar I disorder (H)        Collateral Reports Completed:   Not Applicable    PLAN: (Patient Tasks / Therapist Tasks / Other)  Client to consult with PCP and psychiatry as necessary.        Kd Morris, PhD                                                         ______________________________________________________________________    Individual Treatment Plan    Patient's Name: Bia Bill  YOB: 1965    Date of Creation: 10/11/21  Date Treatment Plan Last  Reviewed/Revised: 11/9/22,12/7/23    DSM5 Diagnoses: Diagnoses: 296.40 Bipolar I Disorder Current or Most Recent Episode Manic, Unspecified  Psychosocial & Contextual Factors: financial difficulties, chronic pain    PROMIS (reviewed every 90 days):     Referral / Collaboration:  Referral to another professional/service is not indicated at this time..    Anticipated number of session for this episode of care: 60  Anticipation frequency of session: Monthly  Anticipated Duration of each session: 38-52 minutes  Treatment plan will be reviewed in 90 days or when goals have been changed.       Bipolar Disorder  Treatment plan:  Education- the Biopsychosocial model of Bipolar Disorder    Client will be able to describe in general terms what Bipolar Disorder  is and is not  Client will be able to describe how Bipolar Disorder  has affected their life in at least two different areas, such as school or work and Home/relationships  Clients  parents/guardians or significant others will be provided information on what Bipolar Disorder  is and the ways it can affect relationships and be encouraged to be a part of clients treatment team.  Medication  Client will participate in medication evaluation for Bipolar Disorder  symptoms and follow medication recommendations.   Identification and management of triggers  Client and therapist will examine patient s history to determine if there are predictable triggers for manic or depressive episodes (e.g. boredom, anger, family stress)  Client and therapist will develop means of diffusing these triggers (e.g. relaxation strategies, boundary setting, anger management)  Comorbid conditions   Client and therapist will asses for comorbid conditions (e.g. anxiety, depression, substance use). and add additional items to treatment plan as needed  Self-care  Client will identify 3 things they can do just for themselves  Client will take time for quiet, reflection, meditation 3 times per week  Client will Learn to set boundaries when appropriate  Client will Identify 2 individuals they can call on for support, distraction  Behavioral Activation  Client will Identify two forms of exercise/activity and engage in them 3 times per week  Client will Identify 2 things that make them laugh, and engage in these 3 times per week.  Client will Identify 2 Creative activities or hobbies  and engage in them 2 times per week  Client will identify music, movies, books that make them feel good and use them 3 times per week  Assessment of progress  Client will engage in assessment of their progress on a regular basis  Patient has reviewed and agreed to the above plan.      Kd Morris, PhD  March 22, 2022Has appointment for cardiac stress test  And

## 2024-02-23 ENCOUNTER — VIRTUAL VISIT (OUTPATIENT)
Dept: PSYCHOLOGY | Facility: CLINIC | Age: 59
End: 2024-02-23
Payer: COMMERCIAL

## 2024-02-23 DIAGNOSIS — F31.62 MODERATE MIXED BIPOLAR I DISORDER (H): Primary | ICD-10-CM

## 2024-02-23 PROCEDURE — 90832 PSYTX W PT 30 MINUTES: CPT | Mod: 93 | Performed by: PSYCHOLOGIST

## 2024-03-19 ENCOUNTER — VIRTUAL VISIT (OUTPATIENT)
Dept: PSYCHOLOGY | Facility: CLINIC | Age: 59
End: 2024-03-19
Payer: COMMERCIAL

## 2024-03-19 DIAGNOSIS — F31.62 MODERATE MIXED BIPOLAR I DISORDER (H): Primary | ICD-10-CM

## 2024-03-19 PROCEDURE — 90832 PSYTX W PT 30 MINUTES: CPT | Mod: 93 | Performed by: PSYCHOLOGIST
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
## 2024-04-22 ENCOUNTER — VIRTUAL VISIT (OUTPATIENT)
Dept: PSYCHOLOGY | Facility: CLINIC | Age: 59
End: 2024-04-22
Payer: COMMERCIAL

## 2024-04-22 DIAGNOSIS — F31.62 MODERATE MIXED BIPOLAR I DISORDER (H): Primary | ICD-10-CM

## 2024-04-22 PROCEDURE — 90832 PSYTX W PT 30 MINUTES: CPT | Mod: 93 | Performed by: PSYCHOLOGIST
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
## 2024-05-06 NOTE — DISCHARGE INSTRUCTIONS
.      Return if symptoms worsen or new symptoms develop.  Follow-up with primary care physician next week for recheck.  Take double dose of Lasix in the morning and if continued mildly short of breath double dose again in the morning on Friday.  No obvious source of your abdominal pain was found on CT scan.  After your breathing treatment her lungs sounded more clear in your setting in the low to mid 90s. You  felt comfortable going home at this time.  If any further abdominal pain shortness of breath or other symptoms occur please return for further assessment and care.  Your chest x-ray did show some pulmonary vascular congestion and therefore take the Lasix as above.  *Abdominal Pain, Unknown Cause (Female)    The exact cause of your abdominal (stomach) pain is not certain. This does not mean that this is something to worry about, or the right tests were not done. Everyone likes to know the exact cause of the problem, but sometimes with abdominal pain, there is no clear-cut cause, and this could be a good thing. The good news is that your symptoms can be treated, and you will feel better.   Your condition does not seem serious now; however, sometimes the signs of a serious problem may take more time to appear. For this reason, it is important for you to watch for any new symptoms, problems, or worsening of your condition.  Over the next few days, the abdominal pain may come and go, or be continuous. Other common symptoms can include nausea and vomiting. Sometimes it can be difficult to tell if you feel nauseous, you may just feel bad and not associate that feeling with nausea. Constipation, diarrhea, and a fever may go along with the pain.  The pain may continue even if treated correctly over the following days. Depending on how things go, sometimes the cause can become clear and may require further or different treatment. Additional evaluations, medications, or tests may be needed.  Home care  Your health care  fEMALE ANNUAL EXAM note      History    Maco Palacios is a 67 year old female who presents for Medicare annual exam.          medical history    Obesity  Mixed hyperlipidemia  SURGICAL history  No past surgical history on file.    social history    Social History     Tobacco Use    Smoking status: Never     Passive exposure: Never    Smokeless tobacco: Never       family history    Family History   Problem Relation Age of Onset    Dementia/Alzheimers Mother     Cancer Father         melanoma. 84 y/o    Cancer Sister         breast in 70s       mEDICATIONS   Current Outpatient Medications   Medication Sig Dispense Refill    rosuvastatin (CRESTOR) 10 MG tablet Take 1 tablet by mouth daily. 90 tablet 1    Aspirin 81 MG Cap Take 81 mg by mouth daily.      levothyroxine 100 MCG tablet Take 1 tablet by mouth daily.      Multiple Vitamin (MULTIVITAMIN ADULT PO) Take 1 tablet by mouth daily.      Cholecalciferol (VITAMIN D3 PO) Take 1 capsule by mouth daily.       No current facility-administered medications for this visit.       aLLERGIES   ALLERGIES:  Not on File      Physical Exam    Vital Signs:  Blood pressure 136/82, pulse 70, resp. rate 16, height 5' 3.5\" (1.613 m), weight 110.5 kg (243 lb 9 oz).  General:  Well developed, well nourished.  In no apparent distress.    Respiratory:  Normal respiratory effort.  No chest wall tenderness.  Lungs clear to auscultation bilaterally.  Cardiovascular:  Regular rate and rhythm.  No murmurs, rubs, or gallops.  Normal S1 and S2.    Psychiatric:  Cooperative.  Appropriate mood and affect.  Normal judgment.  Normal cognitive function.  Normal clock drawing test.      Results    Pertinent labs and imaging studies reviewed.      Assessment AND Plan    Medicare wellness exam was done   Not on opioids   No illicit drug use       Active problems:  Hyperlipidemia, associated with morbid obesity, lipids revealed cholesterol 215 goal is under 200, at goal HDL and LDL, high triglycerides 192.   Discussed following the dash diet, daily exercise and reinforced the importance of weight loss.  Continue rosuvastatin 10 mg daily.  Repeat lipids in 6 months.      Acquired hypothyroidism, clinically and biochemically euthyroid with TSH at 3.164.  Continue levothyroxine at the current dose.    Return in 6 months   provider may prescribe medications for pain, symptoms, or an infection.  Follow the health care provider's instructions for taking these medications.  General care    Rest until your next exam. No strenuous activities.    Try to find positions that ease discomfort. A small pillow placed on the abdomen may help relieve pain.    Something warm on your abdomen (such as a heating pad) may help, but be careful not to burn yourself.  Diet    Do not force yourself to eat, especially if having cramps, vomiting, or diarrhea.    Water is important so you do not get dehydrated. Soup may also be good. Sports drinks may also help, especially if they are not too acidic. Make sure you don't drink sugary drinks as this can make things worse. Take liquids in small amounts. Do not guzzle them.    Caffeine sometimes makes the pain and cramping worse.    Avoid dairy products if you have vomiting or diarrhea.    Don't eat large amounts at a time. Wait a few minutes between bites.    Eat a diet low in fiber (called a low-residue diet). Foods allowed include refined breads, white rice, fruit and vegetable juices without pulp, tender meats. These foods will pass more easily through the intestine.    Avoid fried or fatty foods, dairy, alcohol and spicy foods until your symptoms go away.  Follow-up care  Follow up with your health care provider as instructed, or if your pain does not begin to improve in the next 24 hours.  When to seek medical care  Seek prompt medical care if any of the following occur:    Pain gets worse or moves to the right lower abdomen    New or worsening vomiting or diarrhea    Swelling of the abdomen    Unable to pass stool for more than three days    New fever over 101  F (38.3 C), or rising fever    Blood in vomit or bowel movements (dark red or black color)    Jaundice (yellow color of eyes and skin)    Weakness, dizziness    Chest, arm, back, neck or jaw pain    Unexpected vaginal bleeding or missed period  Call  911  Call emergency services if any of the following occur:    Trouble breathing    Confusion    Fainting or loss of consciousness    Rapid heart rate    Seizure    [id number] Belinda Rios, [address] [facility], [place] [address]. All rights reserved. This information is not intended as a substitute for professional medical care. Always follow your healthcare professional's instructions.          Discharge Instructions: COPD  You have been diagnosed with chronic obstructive pulmonary disease (COPD). This is a name given to a group of diseases that limit the flow of air in and out of your lungs. This makes it harder to breathe. With COPD, you are also more likely to get lung infections. COPD includes chronic bronchitis and emphysema. COPD is most often caused by heavy, long-term cigarette smoking.  Home care  Quit smoking    If you smoke, quit. It is the best thing you can do for your COPD and your overall health.    Join a stop-smoking program. There are even telephone, text message, and Internet programs to help you quit.    Ask your healthcare provider about medicines or other methods to help you quit.    Ask family members to quit smoking as well.    Don't allow people to smoke in your home, in your car, or when they are around you.  Protect yourself from infection    Wash your hands often. Do your best to keep your hands away from your face. Most germs are spread from your hands to your mouth.    Get a flu shot every year. Also ask your provider about pneumonia vaccines.    Avoid crowds. It's especially important to do this in the winter when more people have colds and flu.    To stay healthy, get enough sleep, exercise regularly, and eat a balanced diet. You should:    Get about 8 hours of sleep every night.    Try to exercise for at least 30 minutes on most days.    Have healthy foods including fruits and vegetables, 100% whole grains, lean meats and fish, and low-fat dairy products. Try to stay away from  foods high in fats and sugar.  Take your medicines  Take your medicines exactly as directed. Don't skip doses.  Manage your stress  Stress can make COPD worse. Use this stress management technique:    Find a quiet place and sit or lie in a comfortable position.    Close your eyes and perform breathing exercises for several minutes. Ask your provider about the best way to breathe.  Pulmonary rehabilitation    Pulmonary rehab can help you feel better. These programs include exercise, breathing techniques, information about COPD, counseling, and help for smokers.    Ask your provider or your local hospital about programs in your area.  When to call your healthcare provider  Call your provider immediately if you have any of the following:    Shortness of breath, wheezing, or coughing    Increased mucus    Yellow, green, bloody, or smelly mucus    Fever or chills    Tightness in your chest that does not go away with rest or medicine    An irregular heartbeat or a feeling that your heart is beating very fast    Swollen ankles   Date Last Reviewed: 5/1/2016 2000-2017 [employer]. [address] [place] [address], [place] [address]. All rights reserved. This information is not intended as a substitute for professional medical care. Always follow your healthcare professional's instructions.

## 2024-05-29 ENCOUNTER — VIRTUAL VISIT (OUTPATIENT)
Dept: PSYCHOLOGY | Facility: CLINIC | Age: 59
End: 2024-05-29
Payer: COMMERCIAL

## 2024-05-29 DIAGNOSIS — F31.62 MODERATE MIXED BIPOLAR I DISORDER (H): Primary | ICD-10-CM

## 2024-05-29 PROCEDURE — 90832 PSYTX W PT 30 MINUTES: CPT | Mod: 93 | Performed by: PSYCHOLOGIST

## 2024-05-29 NOTE — PROGRESS NOTES
"    [facility]                                     Progress Note    Disclaimer: Voice recognition software was used to generate this note. As a result, wrong word or 'sound-a-like' substitutions may have occurred due to the inherent limitations of voice recognition software. There may be errors in the script that have gone undetected. Please consider this when interpreting information found in this chart.     Patient Name: Bia Bill  Date: May 29, 2024           Service Type: Individual      Session Start Time: 11:01 AM        Session End Time:11:30 AM           Session Length: 30    Session #: 102  There has been demonstrated improvement in functioning while patient has been engaged in psychotherapy/psychological service- if withdrawn the patient would deteriorate and/or relapse.     Attendees: Client attended alone    Service Modality:  Phone Visit:      Provider verified identity through the following two step process.  Patient provided:  Patient is known previously to provider    Telephone Visit: The patient's condition can be safely assessed and treated via synchronous audio telemedicine encounter.      Reason for Audio Telemedicine Visit: Services only offered telehealth    Originating Site (Patient Location): Patient's home    Distant Site (Provider Location): Provider Remote Setting- Home Office    Consent:  The patient/guardian has verbally consented to:     1. The potential risks and benefits of telemedicine (telephone visit) versus in person care;    The patient has been notified of the following:      \"We have found that certain health care needs can be provided without the need for a face to face visit.  This service lets us provide the care you need with a phone conversation.       I will have full access to your [facility] medical record during this entire phone call.   I will be taking notes for your medical record.      Since this is like an office visit, we will " "bill your insurance company for this service.       There are potential benefits and risks of telephone visits (e.g. limits to patient confidentiality) that differ from in-person visits.?Confidentiality still applies for telephone services, and nobody will record the visit.  It is important to be in a quiet, private space that is free of distractions (including cell phone or other devices) during the visit.??      If during the course of the call I believe a telephone visit is not appropriate, you will not be charged for this service\"     Consent has been obtained for this service by care team member: Yes     Provider location: Off-Site      DATA  Interactive Complexity: No  Crisis: No        Progress Since Last Session (Related to Symptoms / Goals / Homework):   Symptoms: Improving taking care of her diabetes.    Homework: Achieved / completed to satisfaction      Episode of Care Goals: Satisfactory progress - MAINTENANCE (Working to maintain change, with risk of relapse); Intervened by continuing to positively reinforce healthy behavior choice      Current / Ongoing Stressors and Concerns:    Friend and neighbor  will be moving into assisted living. Will miss her.  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 Helps with her mood greatly.          Treatment Objective(s) Addressed in This Session:   identify 2 strategies for more effectively managing Bipolar Disorder.     Review and process traumatic memories        Intervention:    CBT: behavioral activation  Support for recent trauma and subsequent need for life changes.       Assessments completed prior to visit:  The following assessments were completed by patient for this visit:  PHQ9:       11/7/2019    11:00 AM 11/20/2019     3:00 PM 1/6/2020    12:00 PM 1/30/2020     2:43 PM 2/18/2020     2:00 PM 2/28/2020     2:00 PM 3/17/2020     1:00 PM   PHQ-9 SCORE   PHQ-9 Total Score 11 13 10 11 9 8 8     GAD7:       10/1/2019     4:00 PM 11/7/2019    11:00 AM 11/20/2019     3:00 " PM 1/6/2020    12:00 PM 2/18/2020     2:00 PM 2/28/2020     2:00 PM 3/17/2020     1:00 PM   CHIN-7 SCORE   Total Score 9 10 10 8 7 5 7     CAGE-AID:        No data to display              PROMIS 10-Global Health (all questions and answers displayed):        No data to display              [place] Suicide Severity Rating Scale (Lifetime/Recent)      12/18/2018    12:49 AM 1/4/2019     8:43 AM 2/28/2020     2:00 PM   [place] Suicide Severity Rating (Lifetime/Recent)   Q1 Wish to be Dead (Lifetime)   Yes   Q2 Non-Specific Active Suicidal Thoughts (Lifetime)   Yes   Most Severe Ideation Rating (Lifetime)   5   Frequency (Lifetime)   3   Duration (Lifetime)   4   Controllability (Lifetime)   0   Protective Factors  (Lifetime)   5   Reasons for Ideation (Lifetime)   1   Q1 Wished to be Dead (Past Month) no no    Q2 Suicidal Thoughts (Past Month) no no    Q6 Suicide Behavior (Lifetime) no no    RETIRED: 1. Wish to be Dead (Recent)   No   RETIRED: 2. Non-Specific Active Suicidal Thoughts (Recent)   No   3. Active Suicidal Ideation with any Methods (Not Plan) Without Intent to Act (Lifetime)   Yes   RETIRED: 3. Active Suicidal Ideation with any Methods (Not Plan) Without Intent to Act (Recent)   No   RETIRE: 4. Active Suicidal Ideation with Some Intent to Act, Without Specific Plan (Lifetime)   Yes   4. Active Suicidal Ideation with Some Intent to Act, Without Specific Plan (Recent)   No   RETIRE: 5. Active Suicidal Ideation with Specific Plan and Intent (Lifetime)   Yes   RETIRED: 5. Active Suicidal Ideation with Specific Plan and Intent (Recent)   No   Most Severe Ideation Rating (Past Month)   NA   Frequency (Past Month)   NA   Duration (Past Month)   NA   Controllability (Past Month)   NA   Protective Factors (Past Month)   NA   Reasons for Ideation (Past Month)   NA   Actual Attempt (Lifetime)   Yes   Actual Attempt (Past 3 Months)   No   Has subject engaged in non-suicidal self-injurious behavior? (Lifetime)   Yes    Has subject engaged in non-suicidal self-injurious behavior? (Past 3 Months)   No   Interrupted Attempts (Lifetime)   Yes   Interrupted Attempts (Past 3 Months)   No   Aborted or Self-Interrupted Attempt (Lifetime)   Yes   Aborted or Self-Interrupted Attempt (Past 3 Months)   No   Preparatory Acts or Behavior (Lifetime)   Yes   Preparatory Acts or Behavior (Past 3 Months)   No   Most Recent Attempt Actual Lethality Code   2   Most Lethal Attempt Actual Lethality Code   2   Initial/First Attempt Actual Lethality Code   2         ASSESSMENT: Current Emotional / Mental Status (status of significant symptoms):   Risk status (Self / Other harm or suicidal ideation)   Patient denies current fears or concerns for personal safety.   Patient denies current or recent suicidal ideation or behaviors.   Patient denies current or recent homicidal ideation or behaviors.   Patient denies current or recent self injurious behavior or ideation.   Patient denies other safety concerns.   Patient reports there has been no change in risk factors since their last session.     Patient reports there has been no change in protective factors since their last session.     Recommended that patient call 911 or go to the local ED should there be a change in any of these risk factors.     Appearance:   Unable to assess    Eye Contact:   Unable to assess    Psychomotor Behavior: Unable to assess    Attitude:   Cooperative  Interested   Orientation:   All   Speech    Rate / Production: Normal/ Responsive Normal     Volume:  Normal    Mood:    Normal frustrated   Affect:    Appropriate    Thought Content:  Clear    Thought Form:  Coherent  Logical    Insight:    Good      Medication Review:   Changes to psychiatric medications, see updated Medication List in EPIC.      Medication Compliance:   Yes     Changes in Health Issues:   No cigs in 2 years!     Chemical Use Review:   Substance Use: Chemical use reviewed, no active concerns identified       Tobacco Use: No current tobacco use.      Diagnosis:  1. Moderate mixed bipolar I disorder (H)        Collateral Reports Completed:   Not Applicable    PLAN: (Patient Tasks / Therapist Tasks / Other)  Client to consult with PCP and psychiatry as necessary.        Kd Morris, PhD                                                         ______________________________________________________________________    Individual Treatment Plan    Patient's Name: Bia Bill  YOB: 1965    Date of Creation: 10/11/21  Date Treatment Plan Last  Reviewed/Revised: 11/9/22,12/7/23,March 19, 2024      DSM5 Diagnoses: Diagnoses: 296.40 Bipolar I Disorder Current or Most Recent Episode Manic, Unspecified  Psychosocial & Contextual Factors: financial difficulties, chronic pain    PROMIS (reviewed every 90 days):     Referral / Collaboration:  Referral to another professional/service is not indicated at this time..    Anticipated number of session for this episode of care: 60  Anticipation frequency of session: Monthly  Anticipated Duration of each session: 38-52 minutes  Treatment plan will be reviewed in 90 days or when goals have been changed.       Bipolar Disorder  Treatment plan:  Education- the Biopsychosocial model of Bipolar Disorder    Client will be able to describe in general terms what Bipolar Disorder  is and is not  Client will be able to describe how Bipolar Disorder  has affected their life in at least two different areas, such as school or work and Home/relationships  Clients parents/guardians or significant others will be provided information on what Bipolar Disorder  is and the ways it can affect relationships and be encouraged to be a part of clients treatment team.  Medication  Client will participate in medication evaluation for Bipolar Disorder  symptoms and follow medication recommendations.   Identification and management of triggers  Client and therapist will examine patient s  history to determine if there are predictable triggers for manic or depressive episodes (e.g. boredom, anger, family stress)  Client and therapist will develop means of diffusing these triggers (e.g. relaxation strategies, boundary setting, anger management)  Comorbid conditions   Client and therapist will asses for comorbid conditions (e.g. anxiety, depression, substance use). and add additional items to treatment plan as needed  Self-care  Client will identify 3 things they can do just for themselves  Client will take time for quiet, reflection, meditation 3 times per week  Client will Learn to set boundaries when appropriate  Client will Identify 2 individuals they can call on for support, distraction  Behavioral Activation  Client will Identify two forms of exercise/activity and engage in them 3 times per week  Client will Identify 2 things that make them laugh, and engage in these 3 times per week.  Client will Identify 2 Creative activities or hobbies  and engage in them 2 times per week  Client will identify music, movies, books that make them feel good and use them 3 times per week  Assessment of progress  Client will engage in assessment of their progress on a regular basis  Patient has reviewed and agreed to the above plan.      Kd Morris, PhD  March 22, 2022Has appointment for cardiac stress test  And

## 2024-06-03 ENCOUNTER — VIRTUAL VISIT (OUTPATIENT)
Dept: PSYCHOLOGY | Facility: CLINIC | Age: 59
End: 2024-06-03
Payer: COMMERCIAL

## 2024-06-03 DIAGNOSIS — F31.62 MODERATE MIXED BIPOLAR I DISORDER (H): Primary | ICD-10-CM

## 2024-06-03 PROCEDURE — 90834 PSYTX W PT 45 MINUTES: CPT | Mod: 93 | Performed by: PSYCHOLOGIST
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
## 2024-06-17 ENCOUNTER — PATIENT OUTREACH (OUTPATIENT)
Dept: ONCOLOGY | Facility: CLINIC | Age: 59
End: 2024-06-17
Payer: COMMERCIAL

## 2024-06-20 ENCOUNTER — PATIENT OUTREACH (OUTPATIENT)
Dept: ONCOLOGY | Facility: CLINIC | Age: 59
End: 2024-06-20
Payer: COMMERCIAL

## 2024-06-26 ENCOUNTER — VIRTUAL VISIT (OUTPATIENT)
Dept: PSYCHOLOGY | Facility: CLINIC | Age: 59
End: 2024-06-26
Payer: COMMERCIAL

## 2024-06-26 DIAGNOSIS — F31.62 MODERATE MIXED BIPOLAR I DISORDER (H): Primary | ICD-10-CM

## 2024-06-26 PROCEDURE — 90834 PSYTX W PT 45 MINUTES: CPT | Mod: 93 | Performed by: PSYCHOLOGIST
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
## 2024-11-13 NOTE — PATIENT INSTRUCTIONS
H&P reviewed. After examining the patient I find no changes in the patients condition since the H&P had been written.    Vitals:    11/13/24 1052   BP: 137/66   Pulse: 70   Resp: 18   Temp:    SpO2: 97%     Plan for Right partial vs total knee arthroplasty   Take furosemide twice a day morning and noon.    Start metformin twice a day with meals    Check blood sugar daily    See diabetic educator    See me again in 3 months.

## 2025-01-24 NOTE — DISCHARGE INSTRUCTIONS
your prescription for prednisone at the [facility] pharmacy in [place]. Return to the ED if you have increased pain, worsening symptoms, repeated vomiting, or other concerns. Otherwise follow up in clinic for a recheck next week.  
DASH Diet/Consistent Carbohydrate Diabetic Diets
